# Patient Record
Sex: MALE | Race: WHITE | Employment: UNEMPLOYED | URBAN - METROPOLITAN AREA
[De-identification: names, ages, dates, MRNs, and addresses within clinical notes are randomized per-mention and may not be internally consistent; named-entity substitution may affect disease eponyms.]

---

## 2017-01-06 ENCOUNTER — HOSPITAL ENCOUNTER (EMERGENCY)
Age: 52
Discharge: HOME | End: 2017-01-06
Attending: FAMILY MEDICINE

## 2017-01-06 VITALS
DIASTOLIC BLOOD PRESSURE: 85 MMHG | BODY MASS INDEX: 29.03 KG/M2 | HEART RATE: 79 BPM | OXYGEN SATURATION: 97 % | TEMPERATURE: 98.1 F | RESPIRATION RATE: 20 BRPM | SYSTOLIC BLOOD PRESSURE: 158 MMHG | WEIGHT: 219 LBS | HEIGHT: 73 IN

## 2017-01-06 DIAGNOSIS — I87.2 VENOUS INSUFFICIENCY OF LEFT LOWER EXTREMITY: ICD-10-CM

## 2017-01-06 DIAGNOSIS — L03.116 CELLULITIS OF FOOT, LEFT: Primary | ICD-10-CM

## 2017-01-06 RX ORDER — CEPHALEXIN 500 MG/1
500 CAPSULE ORAL 4 TIMES DAILY
Qty: 28 CAP | Refills: 0 | Status: SHIPPED | OUTPATIENT
Start: 2017-01-06 | End: 2017-01-13

## 2017-01-06 RX ORDER — CLINDAMYCIN HYDROCHLORIDE 300 MG/1
300 CAPSULE ORAL 4 TIMES DAILY
Qty: 28 CAP | Refills: 0 | Status: SHIPPED | OUTPATIENT
Start: 2017-01-06 | End: 2017-01-13

## 2017-01-06 RX ORDER — MUPIROCIN 20 MG/G
OINTMENT TOPICAL 3 TIMES DAILY
Qty: 22 G | Refills: 0 | Status: SHIPPED | OUTPATIENT
Start: 2017-01-06 | End: 2017-08-13

## 2017-01-06 NOTE — DISCHARGE INSTRUCTIONS

## 2017-01-06 NOTE — UC NOTE
Wound care applied to left wound with silvadene and non adherent dressing and gauze. Pt provided with cast shoe per request for easier ambulation and wound protection.

## 2017-01-13 NOTE — UC PROVIDER NOTE
Patient is a 45 y.o. male presenting with wound check. The history is provided by the patient. Wound Check    This is a recurrent problem. The current episode started more than 2 days ago. The problem occurs constantly. The problem has been rapidly worsening. The pain is present in the left foot and left toes. The quality of the pain is described as aching. The pain is at a severity of 3/10. The pain is mild. Associated symptoms comments: Drainage from forefoot dorsal surface and swelling  Tender. . He has tried nothing for the symptoms. There has been no history of extremity trauma. Past Medical History   Diagnosis Date    [facility]neurysm [facility])      (with stroke)    Gout     Hypercholesterolemia     Hypertension     Lesion of bladder     [facility]     Stroke ([facility] [facility] 2006     left sided weakness    [facility]us ([facility]     Thyroid disease     TIA (transient ischemic attack) 2012        Past Surgical History   Procedure Laterality Date    Knee arthroscp harv       left knee    Hx orthopaedic           Family History   Problem Relation Age of Onset    Diabetes Father     Diabetes Sister     Diabetes Brother         Social History     Social History    Marital status:      Spouse name: N/A    Number of children: N/A    Years of education: N/A     Occupational History    Not on file.      Social History Main Topics    Smoking status: Current Every Day Smoker     Packs/day: 1.00    Smokeless tobacco: Never Used    Alcohol use 8.4 oz/week     14 Cans of beer per week    Drug use: Yes     Special: Prescription    Sexual activity: Yes     Other Topics Concern    Not on file     Social History Narrative                ALLERGIES: Sulfamethoxazole-trimethoprim; Ciprofloxacin; Codeine; Lyrica [pregabalin]; and Ultram [tramadol]    Review of Systems    Vitals:    01/06/17 1407   BP: 158/85   Pulse: 79   Resp: 20   Temp: 98.1 °F (36.7 °C)   SpO2: 97%   Weight: 99.3 kg (219 lb)   Height: 6' 1\" (1.854 m)       Physical Exam   Constitutional: No distress. Musculoskeletal:        Left foot: There is decreased range of motion, tenderness and swelling. There is no laceration (superficial abrasion with crusting and drainage on dorsal ). Feet:    Nursing note and vitals reviewed. MDM     Differential Diagnosis; Clinical Impression; Plan:     CLINICAL IMPRESSION:  Cellulitis of foot, left  (primary encounter diagnosis)  Venous insufficiency of left lower extremity      DDX    Plan:    Clean wound and dressing  Oral antibiotics and further work up for vascular disease. Amount and/or Complexity of Data Reviewed:    Review and summarize past medical records:  Yes  Risk of Significant Complications, Morbidity, and/or Mortality:   Presenting problems: Moderate  Management options:   Moderate  Progress:   Patient progress:  Stable      Procedures

## 2017-01-27 ENCOUNTER — HOSPITAL ENCOUNTER (EMERGENCY)
Age: 52
Discharge: HOME | End: 2017-01-27
Attending: FAMILY MEDICINE

## 2017-01-27 VITALS
RESPIRATION RATE: 20 BRPM | WEIGHT: 218.6 LBS | DIASTOLIC BLOOD PRESSURE: 96 MMHG | HEART RATE: 77 BPM | TEMPERATURE: 97.6 F | HEIGHT: 73 IN | OXYGEN SATURATION: 95 % | BODY MASS INDEX: 28.97 KG/M2 | SYSTOLIC BLOOD PRESSURE: 182 MMHG

## 2017-01-27 DIAGNOSIS — L03.116 CELLULITIS OF LEFT LOWER EXTREMITY: Primary | ICD-10-CM

## 2017-01-27 RX ORDER — AMOXICILLIN AND CLAVULANATE POTASSIUM 875; 125 MG/1; MG/1
1 TABLET, FILM COATED ORAL 2 TIMES DAILY
Qty: 20 TAB | Refills: 0 | Status: SHIPPED | OUTPATIENT
Start: 2017-01-27 | End: 2017-08-13

## 2017-01-27 NOTE — DISCHARGE INSTRUCTIONS

## 2017-01-28 NOTE — UC PROVIDER NOTE
Patient is a 45 y.o. male presenting with skin problem and foot pain. The history is provided by the patient. Skin Problem    This is a recurrent problem. The current episode started more than 2 days ago. The problem has not changed since onset. The problem is associated with an unknown factor. There has been no fever. The rash is present on the left foot. The pain is mild. The pain has been constant since onset. Associated symptoms include blisters and weeping. Foot Pain    This is a recurrent problem. The current episode started more than 2 days ago. The problem occurs daily. The problem has not changed since onset. The pain is present in the left foot. The pain is mild. Pertinent negatives include no numbness and full range of motion. Treatments tried: Antibiotics. There has been no history of extremity trauma. Past Medical History   Diagnosis Date    Aneurysm [facility])      (with stroke)    Gout     Hypercholesterolemia     Hypertension     Lesion of bladder     [facility] [facility]     Stroke ([facility] [facility] 2006     left sided weakness    Throm[facility]embolus ([facility] [facility]     Thyroid disease     TIA (transient ischemic attack) 2012        Past Surgical History   Procedure Laterality Date    Knee arthroscp harv       left knee    Hx orthopaedic           Family History   Problem Relation Age of Onset    Diabetes Father     Diabetes Sister     Diabetes Brother         Social History     Social History    Marital status:      Spouse name: N/A    Number of children: N/A    Years of education: N/A     Occupational History    Not on file.      Social History Main Topics    Smoking status: Current Every Day Smoker     Packs/day: 1.00    Smokeless tobacco: Never Used    Alcohol use 8.4 oz/week     14 Cans of beer per week    Drug use: Yes     Special: Prescription    Sexual activity: Yes     Other Topics Concern    Not on file     Social History Narrative                ALLERGIES: Sulfamethoxazole-trimethoprim; Ciprofloxacin; Codeine; Lyrica [pregabalin]; and Ultram [tramadol]    Review of Systems   Constitutional: Negative for chills and fever. Skin: Positive for color change and wound. Neurological: Negative for numbness. Vitals:    01/27/17 1259   BP: (!) 182/96   Pulse: 77   Resp: 20   Temp: 97.6 °F (36.4 °C)   SpO2: 95%   Weight: 99.2 kg (218 lb 9.6 oz)   Height: 6' 1\" (1.854 m)       Physical Exam   Constitutional: He is oriented to person, place, and time. He appears well-developed and well-nourished. Pulmonary/Chest: Effort normal.   Neurological: He is alert and oriented to person, place, and time. Skin: Skin is warm and dry. There is erythema. Second and third toes swollen and red   Psychiatric: He has a normal mood and affect. His behavior is normal. Judgment and thought content normal.   Nursing note and vitals reviewed. MDM     Differential Diagnosis; Clinical Impression; Plan:     CLINICAL IMPRESSION:  Cellulitis of left lower extremity  (primary encounter diagnosis)    Plan:  1. Augmentin 875 BID  2. Pt alanly advised to follow up with a PCP. 3.   Risk of Significant Complications, Morbidity, and/or Mortality:   Presenting problems: Moderate  Diagnostic procedures: Moderate  Management options:   Moderate  Progress:   Patient progress:  Stable      Procedures

## 2017-08-13 ENCOUNTER — HOSPITAL ENCOUNTER (EMERGENCY)
Age: 52
Discharge: HOME | End: 2017-08-13
Attending: FAMILY MEDICINE

## 2017-08-13 VITALS
SYSTOLIC BLOOD PRESSURE: 96 MMHG | WEIGHT: 208 LBS | DIASTOLIC BLOOD PRESSURE: 62 MMHG | TEMPERATURE: 97.9 F | BODY MASS INDEX: 27.57 KG/M2 | HEART RATE: 72 BPM | RESPIRATION RATE: 20 BRPM | HEIGHT: 73 IN | OXYGEN SATURATION: 98 %

## 2017-08-13 DIAGNOSIS — L03.032 CELLULITIS OF TOE OF LEFT FOOT: Primary | ICD-10-CM

## 2017-08-13 DIAGNOSIS — M79.672 CHRONIC FOOT PAIN, LEFT: ICD-10-CM

## 2017-08-13 DIAGNOSIS — G89.29 CHRONIC FOOT PAIN, LEFT: ICD-10-CM

## 2017-08-13 RX ORDER — FAMOTIDINE 20 MG/1
20 TABLET, FILM COATED ORAL 2 TIMES DAILY
Qty: 20 TAB | Refills: 0 | Status: SHIPPED | OUTPATIENT
Start: 2017-08-13 | End: 2017-08-23

## 2017-08-13 RX ORDER — KETOCONAZOLE 200 MG/1
200 TABLET ORAL DAILY
Qty: 10 TAB | Refills: 0 | Status: SHIPPED | OUTPATIENT
Start: 2017-08-13 | End: 2017-08-23

## 2017-08-13 RX ORDER — CEPHALEXIN 500 MG/1
500 CAPSULE ORAL 4 TIMES DAILY
Qty: 28 CAP | Refills: 0 | Status: SHIPPED | OUTPATIENT
Start: 2017-08-13 | End: 2017-08-20

## 2017-08-13 RX ORDER — KETOROLAC TROMETHAMINE 10 MG/1
10 TABLET, FILM COATED ORAL
Qty: 20 TAB | Refills: 0 | Status: SHIPPED | OUTPATIENT
Start: 2017-08-13 | End: 2017-12-21

## 2017-08-13 RX ORDER — CLINDAMYCIN HYDROCHLORIDE 300 MG/1
300 CAPSULE ORAL 4 TIMES DAILY
Qty: 28 CAP | Refills: 0 | Status: SHIPPED | OUTPATIENT
Start: 2017-08-13 | End: 2017-08-20

## 2017-08-13 NOTE — UC PROVIDER NOTE
Patient is a 45 y.o. male presenting with wound check. The history is provided by the patient. Wound Check    This is a recurrent problem. The current episode started more than 1 week ago. The problem occurs constantly. The problem has not changed since onset. The pain is present in the left foot. The quality of the pain is described as constant (burning ). The pain is at a severity of 9/10. The pain is severe. Associated symptoms include limited range of motion (of toes). There has been no history of extremity trauma. She is been followed by podiatrist  Not seen by vascular surgeon and wound care    Past Medical History:   Diagnosis Date    Aneurysm ([address]     (with stroke)    Gout     Hypercholesterolemia     Hypertension     Lesion of bladder     [facility] [facility]     Stroke ([facility] [address] [facility] 2006    left sided weakness    Thromboembolus ([facility] [facility]     Thyroid disease     TIA (transient ischemic attack) 2012        Past Surgical History:   Procedure Laterality Date    HX ORTHOPAEDIC      KNEE ARTHROSCP HARV      left knee         Family History   Problem Relation Age of Onset    Diabetes Father     Diabetes Sister     Diabetes Brother         Social History     Social History    Marital status:      Spouse name: N/A    Number of children: N/A    Years of education: N/A     Occupational History    Not on file. Social History Main Topics    Smoking status: Current Every Day Smoker     Packs/day: 1.00    Smokeless tobacco: Never Used    Alcohol use 8.4 oz/week     14 Cans of beer per week    Drug use: Yes     Special: Prescription    Sexual activity: Yes     Other Topics Concern    Not on file     Social History Narrative                ALLERGIES: Sulfamethoxazole-trimethoprim; Ciprofloxacin; Codeine; Lyrica [pregabalin]; and Ultram [tramadol]    Review of Systems   All other systems reviewed and are negative.       Vitals:    08/13/17 1456 08/13/17 1458 08/13/17 1502   BP:  96/62    Pulse:  72 Resp:  20    Temp:  97.9 °F (36.6 °C)    SpO2:  98%    Weight:   94.3 kg (208 lb)   Height: 6' 1\" (1.854 m)         Physical Exam   Constitutional: No distress. Musculoskeletal:        Left foot: There is tenderness, swelling, decreased capillary refill and deformity (of big toe). Feet:    Nursing note and vitals reviewed. MDM     Differential Diagnosis; Clinical Impression; Plan:     CLINICAL IMPRESSION:  Cellulitis of toe of left foot  (primary encounter diagnosis)  Chronic foot pain, left      DDX    Plan:    Refer to podiatry and wound care center  Start clinda and keflex  Bacitracin dressing  Daily wound care  D/w friend and caretaker     Treatment and follow up plan discussed in length   Amount and/or Complexity of Data Reviewed:    Review and summarize past medical records:  Yes  Risk of Significant Complications, Morbidity, and/or Mortality:   Presenting problems: Moderate  Management options:   Moderate  Progress:   Patient progress:  Stable      Procedures

## 2017-08-13 NOTE — DISCHARGE INSTRUCTIONS

## 2017-12-21 ENCOUNTER — APPOINTMENT (OUTPATIENT)
Dept: GENERAL RADIOLOGY | Age: 52
DRG: 383 | End: 2017-12-21
Attending: PODIATRIST
Payer: MEDICAID

## 2017-12-21 ENCOUNTER — APPOINTMENT (OUTPATIENT)
Dept: GENERAL RADIOLOGY | Age: 52
DRG: 383 | End: 2017-12-21
Attending: EMERGENCY MEDICINE
Payer: MEDICAID

## 2017-12-21 ENCOUNTER — APPOINTMENT (OUTPATIENT)
Dept: ULTRASOUND IMAGING | Age: 52
DRG: 383 | End: 2017-12-21
Attending: EMERGENCY MEDICINE
Payer: MEDICAID

## 2017-12-21 ENCOUNTER — HOSPITAL ENCOUNTER (INPATIENT)
Age: 52
LOS: 23 days | Discharge: HOME HEALTH | DRG: 383 | End: 2018-01-13
Attending: EMERGENCY MEDICINE | Admitting: INTERNAL MEDICINE
Payer: MEDICAID

## 2017-12-21 DIAGNOSIS — R53.81 PHYSICAL DEBILITY: ICD-10-CM

## 2017-12-21 DIAGNOSIS — L03.119 CELLULITIS OF LOWER EXTREMITY, UNSPECIFIED LATERALITY: Primary | ICD-10-CM

## 2017-12-21 DIAGNOSIS — F33.1 MODERATE EPISODE OF RECURRENT MAJOR DEPRESSIVE DISORDER (HCC): ICD-10-CM

## 2017-12-21 DIAGNOSIS — G89.0 CENTRAL PAIN SYNDROME: ICD-10-CM

## 2017-12-21 DIAGNOSIS — Z79.891 LONG TERM CURRENT USE OF METHADONE FOR PAIN CONTROL: ICD-10-CM

## 2017-12-21 PROBLEM — L03.116 BILATERAL CELLULITIS OF LOWER LEG: Status: ACTIVE | Noted: 2017-12-21

## 2017-12-21 PROBLEM — L03.115 BILATERAL CELLULITIS OF LOWER LEG: Status: ACTIVE | Noted: 2017-12-21

## 2017-12-21 PROBLEM — I10 HTN (HYPERTENSION): Status: ACTIVE | Noted: 2017-12-21

## 2017-12-21 LAB
ALBUMIN SERPL-MCNC: 3.5 G/DL (ref 3.5–5)
ALBUMIN/GLOB SERPL: 1 {RATIO} (ref 1.1–2.2)
ALP SERPL-CCNC: 240 U/L (ref 45–117)
ALT SERPL-CCNC: 27 U/L (ref 12–78)
ANION GAP SERPL CALC-SCNC: 8 MMOL/L (ref 5–15)
APTT PPP: 29.3 SEC (ref 22.1–32.5)
AST SERPL-CCNC: 20 U/L (ref 15–37)
ATRIAL RATE: 63 BPM
BASOPHILS # BLD: 0 K/UL (ref 0–0.1)
BASOPHILS NFR BLD: 0 % (ref 0–1)
BILIRUB SERPL-MCNC: 0.6 MG/DL (ref 0.2–1)
BNP SERPL-MCNC: 79 PG/ML (ref 0–125)
BUN SERPL-MCNC: 6 MG/DL (ref 6–20)
BUN/CREAT SERPL: 9 (ref 12–20)
CALCIUM SERPL-MCNC: 8.6 MG/DL (ref 8.5–10.1)
CALCULATED P AXIS, ECG09: 57 DEGREES
CALCULATED R AXIS, ECG10: 10 DEGREES
CALCULATED T AXIS, ECG11: 24 DEGREES
CHLORIDE SERPL-SCNC: 105 MMOL/L (ref 97–108)
CK MB CFR SERPL CALC: 1.4 % (ref 0–2.5)
CK MB SERPL-MCNC: 1.1 NG/ML (ref 5–25)
CK SERPL-CCNC: 77 U/L (ref 39–308)
CO2 SERPL-SCNC: 25 MMOL/L (ref 21–32)
CREAT SERPL-MCNC: 0.69 MG/DL (ref 0.7–1.3)
DIAGNOSIS, 93000: NORMAL
EOSINOPHIL # BLD: 0.2 K/UL (ref 0–0.4)
EOSINOPHIL NFR BLD: 2 % (ref 0–7)
ERYTHROCYTE [DISTWIDTH] IN BLOOD BY AUTOMATED COUNT: 16.9 % (ref 11.5–14.5)
ERYTHROCYTE [SEDIMENTATION RATE] IN BLOOD: 17 MM/HR (ref 0–20)
GLOBULIN SER CALC-MCNC: 3.5 G/DL (ref 2–4)
GLUCOSE SERPL-MCNC: 101 MG/DL (ref 65–100)
HCT VFR BLD AUTO: 36.6 % (ref 36.6–50.3)
HGB BLD-MCNC: 11.8 G/DL (ref 12.1–17)
INR PPP: 1.1 (ref 0.9–1.1)
LACTATE SERPL-SCNC: 0.9 MMOL/L (ref 0.4–2)
LYMPHOCYTES # BLD: 2.1 K/UL (ref 0.8–3.5)
LYMPHOCYTES NFR BLD: 17 % (ref 12–49)
MCH RBC QN AUTO: 27.7 PG (ref 26–34)
MCHC RBC AUTO-ENTMCNC: 32.2 G/DL (ref 30–36.5)
MCV RBC AUTO: 85.9 FL (ref 80–99)
MONOCYTES # BLD: 0.8 K/UL (ref 0–1)
MONOCYTES NFR BLD: 6 % (ref 5–13)
NEUTS SEG # BLD: 9.6 K/UL (ref 1.8–8)
NEUTS SEG NFR BLD: 75 % (ref 32–75)
P-R INTERVAL, ECG05: 180 MS
PLATELET # BLD AUTO: 319 K/UL (ref 150–400)
POTASSIUM SERPL-SCNC: 3.8 MMOL/L (ref 3.5–5.1)
PROT SERPL-MCNC: 7 G/DL (ref 6.4–8.2)
PROTHROMBIN TIME: 11.4 SEC (ref 9–11.1)
Q-T INTERVAL, ECG07: 458 MS
QRS DURATION, ECG06: 96 MS
QTC CALCULATION (BEZET), ECG08: 468 MS
RBC # BLD AUTO: 4.26 M/UL (ref 4.1–5.7)
SODIUM SERPL-SCNC: 138 MMOL/L (ref 136–145)
THERAPEUTIC RANGE,PTTT: NORMAL SECS (ref 58–77)
VENTRICULAR RATE, ECG03: 63 BPM
WBC # BLD AUTO: 12.7 K/UL (ref 4.1–11.1)

## 2017-12-21 PROCEDURE — 73630 X-RAY EXAM OF FOOT: CPT

## 2017-12-21 PROCEDURE — 96368 THER/DIAG CONCURRENT INF: CPT

## 2017-12-21 PROCEDURE — 96375 TX/PRO/DX INJ NEW DRUG ADDON: CPT

## 2017-12-21 PROCEDURE — 80053 COMPREHEN METABOLIC PANEL: CPT | Performed by: EMERGENCY MEDICINE

## 2017-12-21 PROCEDURE — 83605 ASSAY OF LACTIC ACID: CPT | Performed by: EMERGENCY MEDICINE

## 2017-12-21 PROCEDURE — 85652 RBC SED RATE AUTOMATED: CPT | Performed by: EMERGENCY MEDICINE

## 2017-12-21 PROCEDURE — 99285 EMERGENCY DEPT VISIT HI MDM: CPT

## 2017-12-21 PROCEDURE — 74011000250 HC RX REV CODE- 250: Performed by: INTERNAL MEDICINE

## 2017-12-21 PROCEDURE — 85730 THROMBOPLASTIN TIME PARTIAL: CPT | Performed by: EMERGENCY MEDICINE

## 2017-12-21 PROCEDURE — 93005 ELECTROCARDIOGRAM TRACING: CPT

## 2017-12-21 PROCEDURE — 85025 COMPLETE CBC W/AUTO DIFF WBC: CPT | Performed by: EMERGENCY MEDICINE

## 2017-12-21 PROCEDURE — 74011000258 HC RX REV CODE- 258: Performed by: EMERGENCY MEDICINE

## 2017-12-21 PROCEDURE — 96365 THER/PROPH/DIAG IV INF INIT: CPT

## 2017-12-21 PROCEDURE — 83880 ASSAY OF NATRIURETIC PEPTIDE: CPT | Performed by: EMERGENCY MEDICINE

## 2017-12-21 PROCEDURE — 74011250636 HC RX REV CODE- 250/636: Performed by: INTERNAL MEDICINE

## 2017-12-21 PROCEDURE — 82550 ASSAY OF CK (CPK): CPT | Performed by: EMERGENCY MEDICINE

## 2017-12-21 PROCEDURE — 74011250636 HC RX REV CODE- 250/636: Performed by: EMERGENCY MEDICINE

## 2017-12-21 PROCEDURE — 87040 BLOOD CULTURE FOR BACTERIA: CPT | Performed by: EMERGENCY MEDICINE

## 2017-12-21 PROCEDURE — 65660000000 HC RM CCU STEPDOWN

## 2017-12-21 PROCEDURE — 74011000258 HC RX REV CODE- 258: Performed by: INTERNAL MEDICINE

## 2017-12-21 PROCEDURE — 85610 PROTHROMBIN TIME: CPT | Performed by: EMERGENCY MEDICINE

## 2017-12-21 PROCEDURE — 71010 XR CHEST PORT: CPT

## 2017-12-21 PROCEDURE — 93970 EXTREMITY STUDY: CPT

## 2017-12-21 PROCEDURE — 36415 COLL VENOUS BLD VENIPUNCTURE: CPT | Performed by: EMERGENCY MEDICINE

## 2017-12-21 RX ORDER — NALOXONE HYDROCHLORIDE 0.4 MG/ML
0.4 INJECTION, SOLUTION INTRAMUSCULAR; INTRAVENOUS; SUBCUTANEOUS AS NEEDED
Status: DISCONTINUED | OUTPATIENT
Start: 2017-12-21 | End: 2018-01-13 | Stop reason: HOSPADM

## 2017-12-21 RX ORDER — VANCOMYCIN 1.75 GRAM/500 ML IN 0.9 % SODIUM CHLORIDE INTRAVENOUS
1750
Status: COMPLETED | OUTPATIENT
Start: 2017-12-21 | End: 2017-12-21

## 2017-12-21 RX ORDER — SODIUM CHLORIDE 0.9 % (FLUSH) 0.9 %
5-10 SYRINGE (ML) INJECTION AS NEEDED
Status: DISCONTINUED | OUTPATIENT
Start: 2017-12-21 | End: 2017-12-23

## 2017-12-21 RX ORDER — SODIUM CHLORIDE 0.9 % (FLUSH) 0.9 %
5-10 SYRINGE (ML) INJECTION AS NEEDED
Status: DISCONTINUED | OUTPATIENT
Start: 2017-12-21 | End: 2018-01-13 | Stop reason: HOSPADM

## 2017-12-21 RX ORDER — SODIUM CHLORIDE 0.9 % (FLUSH) 0.9 %
5-10 SYRINGE (ML) INJECTION EVERY 8 HOURS
Status: DISCONTINUED | OUTPATIENT
Start: 2017-12-21 | End: 2018-01-13 | Stop reason: HOSPADM

## 2017-12-21 RX ORDER — ENOXAPARIN SODIUM 100 MG/ML
40 INJECTION SUBCUTANEOUS EVERY 24 HOURS
Status: DISCONTINUED | OUTPATIENT
Start: 2017-12-21 | End: 2018-01-13 | Stop reason: HOSPADM

## 2017-12-21 RX ORDER — VANCOMYCIN HYDROCHLORIDE
1250 EVERY 8 HOURS
Status: DISCONTINUED | OUTPATIENT
Start: 2017-12-21 | End: 2017-12-26

## 2017-12-21 RX ORDER — ALLOPURINOL 100 MG/1
TABLET ORAL DAILY
Status: ON HOLD | COMMUNITY
End: 2018-01-12

## 2017-12-21 RX ORDER — GUAIFENESIN 100 MG/5ML
81 LIQUID (ML) ORAL DAILY
Status: DISCONTINUED | OUTPATIENT
Start: 2017-12-22 | End: 2018-01-13 | Stop reason: HOSPADM

## 2017-12-21 RX ORDER — HYDROMORPHONE HYDROCHLORIDE 2 MG/ML
1 INJECTION, SOLUTION INTRAMUSCULAR; INTRAVENOUS; SUBCUTANEOUS
Status: DISCONTINUED | OUTPATIENT
Start: 2017-12-21 | End: 2017-12-22

## 2017-12-21 RX ORDER — HYDROMORPHONE HYDROCHLORIDE 2 MG/ML
1 INJECTION, SOLUTION INTRAMUSCULAR; INTRAVENOUS; SUBCUTANEOUS
Status: DISCONTINUED | OUTPATIENT
Start: 2017-12-21 | End: 2017-12-21

## 2017-12-21 RX ORDER — SODIUM CHLORIDE 900 MG/100ML
INJECTION INTRAVENOUS
Status: DISPENSED
Start: 2017-12-21 | End: 2017-12-22

## 2017-12-21 RX ORDER — ONDANSETRON 2 MG/ML
4 INJECTION INTRAMUSCULAR; INTRAVENOUS
Status: COMPLETED | OUTPATIENT
Start: 2017-12-21 | End: 2017-12-21

## 2017-12-21 RX ORDER — SODIUM CHLORIDE 0.9 % (FLUSH) 0.9 %
5-10 SYRINGE (ML) INJECTION EVERY 8 HOURS
Status: DISCONTINUED | OUTPATIENT
Start: 2017-12-21 | End: 2017-12-23

## 2017-12-21 RX ORDER — LEVOTHYROXINE SODIUM 75 UG/1
75 TABLET ORAL
Status: DISCONTINUED | OUTPATIENT
Start: 2017-12-22 | End: 2018-01-13 | Stop reason: HOSPADM

## 2017-12-21 RX ORDER — LISINOPRIL 20 MG/1
20 TABLET ORAL DAILY
Status: DISCONTINUED | OUTPATIENT
Start: 2017-12-22 | End: 2017-12-27

## 2017-12-21 RX ORDER — ONDANSETRON 2 MG/ML
4 INJECTION INTRAMUSCULAR; INTRAVENOUS
Status: DISCONTINUED | OUTPATIENT
Start: 2017-12-21 | End: 2018-01-13 | Stop reason: HOSPADM

## 2017-12-21 RX ORDER — HYDROMORPHONE HYDROCHLORIDE 2 MG/ML
1 INJECTION, SOLUTION INTRAMUSCULAR; INTRAVENOUS; SUBCUTANEOUS
Status: COMPLETED | OUTPATIENT
Start: 2017-12-21 | End: 2017-12-21

## 2017-12-21 RX ADMIN — HYDROMORPHONE HYDROCHLORIDE 1 MG: 2 INJECTION, SOLUTION INTRAMUSCULAR; INTRAVENOUS; SUBCUTANEOUS at 17:00

## 2017-12-21 RX ADMIN — VANCOMYCIN HYDROCHLORIDE 1250 MG: 10 INJECTION, POWDER, LYOPHILIZED, FOR SOLUTION INTRAVENOUS at 22:13

## 2017-12-21 RX ADMIN — HYDROMORPHONE HYDROCHLORIDE 1 MG: 2 INJECTION, SOLUTION INTRAMUSCULAR; INTRAVENOUS; SUBCUTANEOUS at 19:11

## 2017-12-21 RX ADMIN — PIPERACILLIN SODIUM,TAZOBACTAM SODIUM 3.38 G: 3; .375 INJECTION, POWDER, FOR SOLUTION INTRAVENOUS at 14:12

## 2017-12-21 RX ADMIN — HYDROMORPHONE HYDROCHLORIDE 1 MG: 2 INJECTION, SOLUTION INTRAMUSCULAR; INTRAVENOUS; SUBCUTANEOUS at 11:37

## 2017-12-21 RX ADMIN — FAMOTIDINE 20 MG: 10 INJECTION, SOLUTION INTRAVENOUS at 21:49

## 2017-12-21 RX ADMIN — SODIUM CHLORIDE: 900 INJECTION, SOLUTION INTRAVENOUS at 21:48

## 2017-12-21 RX ADMIN — HYDROMORPHONE HYDROCHLORIDE 1 MG: 2 INJECTION, SOLUTION INTRAMUSCULAR; INTRAVENOUS; SUBCUTANEOUS at 23:40

## 2017-12-21 RX ADMIN — VANCOMYCIN HYDROCHLORIDE 1750 MG: 10 INJECTION, POWDER, LYOPHILIZED, FOR SOLUTION INTRAVENOUS at 14:51

## 2017-12-21 RX ADMIN — HYDROMORPHONE HYDROCHLORIDE 1 MG: 2 INJECTION, SOLUTION INTRAMUSCULAR; INTRAVENOUS; SUBCUTANEOUS at 21:36

## 2017-12-21 RX ADMIN — Medication 10 ML: at 23:40

## 2017-12-21 RX ADMIN — ONDANSETRON HYDROCHLORIDE 4 MG: 2 INJECTION, SOLUTION INTRAMUSCULAR; INTRAVENOUS at 11:37

## 2017-12-21 RX ADMIN — ENOXAPARIN SODIUM 40 MG: 40 INJECTION SUBCUTANEOUS at 17:00

## 2017-12-21 RX ADMIN — Medication 10 ML: at 21:36

## 2017-12-21 RX ADMIN — Medication 10 ML: at 17:02

## 2017-12-21 NOTE — H&P
Hospitalist Admission Note    NAME: Shannan Thakur   :  1965   MRN:  [id number]     Date/Time:  2017 4:21 PM    Patient PCP: Luis Alfredo Yougn MD Podiatry= Dr Suri Cervantes  ________________________________________________________________________    My assessment of this patient's clinical condition and my plan of care is as follows. Assessment / Plan:  L >R leg cellulitis POA  Chronic non healing L Foot ulcer/wound POA  Leukocytosis POA  WBC= 12.7  Lact normal    Admit to telemetry bed - accelerated HTN in ER  IV Abx - zosyn + Vanc for now  Follow Blood Cx  Wound care consult  Pain management    Morbid Obesity POA  L Hemiparesis/Old CVA POA  Chronic Pain syndrome on maintenance Methadone- [facility]  HTN  Hyperlipidemia    Cont baseline methadone after pharmacy confirms- pt claims on 65 mg daily   Cont ASA  Cont lisinopril        Code Status: Full  Surrogate Decision Maker: sister Tad Packer # [phone]    DVT Prophylaxis: SQ Lovenox  GI Prophylaxis: pepcid    Baseline: pt is functional at baseline- uses cane to ambulate at baseline        Subjective:   CHIEF COMPLAINT: worsening L LE swelling with pain x 2 days    HISTORY OF PRESENT ILLNESS:     Kelly Fletcher is a 47 y.o.  male who presents with above complains from home ambulatory. Pt came in with CC of worsening pain in the L foot x 2 days  H/o associated redness swelling L leg >R leg with low grade fever at home as per pt  H/o doing his own wound care for the L foot -- use to have [facility] for wound care & sees Dr Suri Cervantes (podiarty) too. No h/o diabetes  H/o Chronic pain from L sided hemiparesis from old CVA - on methadone as per pt. Pt was found to have significant pain with swelling & redness of the L leg , some on R leg in ER with unremarkable blood work in ER    We were asked to admit for work up and evaluation of the above problems.      Past Medical History:   Diagnosis Date    Aneurysm ([address]     (with stroke)    Gout     Hypercholesterolemia     Hypertension     Lesion of bladder     [facility] ([facility] [facility]     Stroke [facility]) 2006    left sided weakness    Thromboembolus ([facility] [address] [facility]     Thyroid disease     TIA (transient ischemic attack) 2012        Past Surgical History:   Procedure Laterality Date    HX ORTHOPAEDIC      KNEE ARTHROSCP HARV      left knee       Social History   Substance Use Topics    Smoking status: Current Every Day Smoker     Packs/day: 1.00    Smokeless tobacco: Never Used    Alcohol use 8.4 oz/week     14 Cans of beer per week        Family History   Problem Relation Age of Onset    Diabetes Father     Diabetes Sister     Diabetes Brother      Allergies   Allergen Reactions    Sulfamethoxazole-Trimethoprim Rash     L eye and L hand    Ciprofloxacin Hives     Per pcp records    Codeine Hives     Tolerates dilaudid, oxycodone    Lyrica [Pregabalin] Hives    Ultram [Tramadol] Hives        Prior to Admission medications    Medication Sig Start Date End Date Taking? Authorizing Provider   METHADONE HCL (METHADONE, BULK,) 65 mg by Does Not Apply route daily. Yes Saravanan Horn MD   levothyroxine (SYNTHROID) 75 mcg tablet Take 75 mcg by mouth Daily (before breakfast). Yes Saravanan Horn MD   aspirin 81 mg chewable tablet Take 1 Tab by mouth daily. 12/13/12  Yes Tasia Donis MD   lisinopril (PRINIVIL, ZESTRIL) 20 mg tablet Take 1 Tab by mouth daily. 6/22/12  Yes Good Joy NP   allopurinol (ZYLOPRIM) 100 mg tablet Take  by mouth daily. Historical Provider   colchicine (COLCRYS) 0.6 mg tablet Take 0.6 mg by mouth daily.     Saravanan Horn MD       REVIEW OF SYSTEMS:       Total of 12 systems reviewed as follows:       POSITIVE= underlined text  Negative = text not underlined  General:  fever, chills, sweats, generalized weakness, weight loss/gain,      loss of appetite   Eyes:    blurred vision, eye pain, loss of vision, double vision  ENT:    rhinorrhea, pharyngitis   Respiratory:   cough, sputum production, SOB, BECK, wheezing, pleuritic pain   Cardiology:   chest pain, palpitations, orthopnea, PND, B/L LE edema, syncope   Gastrointestinal:  abdominal pain , N/V, diarrhea, dysphagia, constipation, bleeding   Genitourinary:  frequency, urgency, dysuria, hematuria, incontinence   Muskuloskeletal :  Arthralgia ( L foot pain) myalgia, back pain  Hematology:  easy bruising, nose or gum bleeding, lymphadenopathy   Dermatological: Chronic L Foot ulcer  Endocrine:   hot flashes or polydipsia   Neurological:  headache, dizziness, confusion, focal weakness, paresthesia,     Speech difficulties, memory loss, gait difficulty  Psychological: Feelings of anxiety, depression, agitation    Objective:   VITALS:    Visit Vitals    /82    Pulse 72    Resp 16    Ht 6' 1\" (1.854 m)    Wt 94.3 kg (208 lb)    SpO2 96%    BMI 27.44 kg/m2       PHYSICAL EXAM:    General:    Alert, cooperative, no distress, appears stated age, obese +     HEENT: Atraumatic, anicteric sclerae, pink conjunctivae     No oral ulcers, mucosa moist, throat clear, dentition fair  Neck:  Supple, symmetrical,  thyroid: non tender  Lungs:   Clear to auscultation bilaterally. No Wheezing or Rhonchi. No rales. Chest wall:  No tenderness  No Accessory muscle use. Heart:   Regular  rhythm,  No  murmur   No edema  Abdomen:   Soft, non-tender. Not distended. Bowel sounds normal  Extremities: No cyanosis. No clubbing,      Skin turgor normal, Capillary refill normal, Radial dial pulse 2+  Skin:     L >R LE redness+, tenderness +, warm to touch +, Draining Wound/uler on the L foot noted +  Psych:  Good insight. Not depressed. Not anxious or agitated. Neurologic: EOMs intact. No facial asymmetry. No aphasia or slurred speech. L hemiparesis at baseline noted +, Sensation grossly intact.  Alert and oriented X 4.     _______________________________________________________________________  Care Plan discussed with:    Comments   Patient x    Family      RN x Care Manager                    Consultant:      _______________________________________________________________________  Expected  Disposition:   Home with Family    HH/PT/OT/RN ?   SNF/LTC ? [facility]    ________________________________________________________________________  TOTAL TIME:  58 Minutes    Critical Care Provided     Minutes non procedure based      Comments    x Reviewed previous records   >50% of visit spent in counseling and coordination of care x Discussion with patient and questions answered       ________________________________________________________________________  Signed: Abdifatah Rodgers MD    Procedures: see electronic medical records for all procedures/Xrays and details which were not copied into this note but were reviewed prior to creation of Plan. LAB DATA REVIEWED:    Recent Results (from the past 24 hour(s))   EKG, 12 LEAD, INITIAL    Collection Time: 12/21/17 11:24 AM   Result Value Ref Range    Ventricular Rate 63 BPM    Atrial Rate 63 BPM    P-R Interval 180 ms    QRS Duration 96 ms    Q-T Interval 458 ms    QTC Calculation (Bezet) 468 ms    Calculated P Axis 57 degrees    Calculated R Axis 10 degrees    Calculated T Axis 24 degrees    Diagnosis       Normal sinus rhythm  Minimal voltage criteria for LVH, may be normal variant  When compared with ECG of 09-OCT-2016 14:24,  No significant change was found  Confirmed by Frederic Murray ([id number]) on 12/21/2017 11:59:28 AM     CBC WITH AUTOMATED DIFF    Collection Time: 12/21/17 11:28 AM   Result Value Ref Range    WBC 12.7 (H) 4.1 - 11.1 K/uL    RBC 4.26 4.10 - 5.70 M/uL    HGB 11.8 (L) 12.1 - 17.0 g/dL    HCT 36.6 36.6 - 50.3 %    MCV 85.9 80.0 - 99.0 FL    MCH 27.7 26.0 - 34.0 PG    MCHC 32.2 30.0 - 36.5 g/dL    RDW 16.9 (H) 11.5 - 14.5 %    PLATELET 993 785 - 944 K/uL    NEUTROPHILS 75 32 - 75 %    LYMPHOCYTES 17 12 - 49 %    MONOCYTES 6 5 - 13 %    EOSINOPHILS 2 0 - 7 %    BASOPHILS 0 0 - 1 %    ABS.  NEUTROPHILS 9.6 (H) 1.8 - 8.0 K/UL    ABS. LYMPHOCYTES 2.1 0.8 - 3.5 K/UL    ABS. MONOCYTES 0.8 0.0 - 1.0 K/UL    ABS. EOSINOPHILS 0.2 0.0 - 0.4 K/UL    ABS. BASOPHILS 0.0 0.0 - 0.1 K/UL   METABOLIC PANEL, COMPREHENSIVE    Collection Time: 12/21/17 11:28 AM   Result Value Ref Range    Sodium 138 136 - 145 mmol/L    Potassium 3.8 3.5 - 5.1 mmol/L    Chloride 105 97 - 108 mmol/L    CO2 25 21 - 32 mmol/L    Anion gap 8 5 - 15 mmol/L    Glucose 101 (H) 65 - 100 mg/dL    BUN 6 6 - 20 MG/DL    Creatinine 0.69 (L) 0.70 - 1.30 MG/DL    BUN/Creatinine ratio 9 (L) 12 - 20      GFR est AA >60 >60 ml/min/1.73m2    GFR est non-AA >60 >60 ml/min/1.73m2    Calcium 8.6 8.5 - 10.1 MG/DL    Bilirubin, total 0.6 0.2 - 1.0 MG/DL    ALT (SGPT) 27 12 - 78 U/L    AST (SGOT) 20 15 - 37 U/L    Alk.  phosphatase 240 (H) 45 - 117 U/L    Protein, total 7.0 6.4 - 8.2 g/dL    Albumin 3.5 3.5 - 5.0 g/dL    Globulin 3.5 2.0 - 4.0 g/dL    A-G Ratio 1.0 (L) 1.1 - 2.2     LACTIC ACID    Collection Time: 12/21/17 11:28 AM   Result Value Ref Range    Lactic acid 0.9 0.4 - 2.0 MMOL/L   PROTHROMBIN TIME + INR    Collection Time: 12/21/17 11:28 AM   Result Value Ref Range    INR 1.1 0.9 - 1.1      Prothrombin time 11.4 (H) 9.0 - 11.1 sec   PTT    Collection Time: 12/21/17 11:28 AM   Result Value Ref Range    aPTT 29.3 22.1 - 32.5 sec    aPTT, therapeutic range     58.0 - 77.0 SECS   NT-PRO BNP    Collection Time: 12/21/17 11:28 AM   Result Value Ref Range    NT pro-BNP 79 0 - 125 PG/ML   CK W/ CKMB & INDEX    Collection Time: 12/21/17 11:28 AM   Result Value Ref Range    CK 77 39 - 308 U/L    CK - MB 1.1 <3.6 NG/ML    CK-MB Index 1.4 0 - 2.5     SED RATE (ESR)    Collection Time: 12/21/17 11:28 AM   Result Value Ref Range    Sed rate, automated 17 0 - 20 mm/hr

## 2017-12-21 NOTE — ED NOTES
Bedside shift change report given to Ira Vásquez RN (oncoming nurse) by Alejandro Packer RN (offgoing nurse). Report included the following information SBAR, ED Summary, MAR and Recent Results.

## 2017-12-21 NOTE — PROGRESS NOTES
Pharmacy Automatic Renal Dosing Protocol - Antimicrobials    Indication for Antimicrobials: Skin and Soft Tissue Infection    Current Regimen of Each Antimicrobial:  Piperacillin Tazobactam 3.375 grams Q8H  (Start Date 12/21; Day # 1)  Vancomycin 1750 mg x 1 then 1250 mg Q8H Start Date 12/21; Day # 1)    Previous Antimicrobial Therapy:    Vancomycin Goal Level: 10-15    Measured / Extrapolated Vancomycin Level:     Significant Cultures: Pending      Labs:  Recent Labs      12/21/17   1128   CREA  0.69*   BUN  6   WBC  12.7*     No data recorded. Paralysis, amputations, malnutrition: No  Creatinine Clearance (mL/min) or Dialysis: > 100    Impression/Plan:   Vancomycin 1750 mg x 1 then 1250 mg Q8H with anticipate trough of 14 mcg/ml. Piperacillin tazobactam 3.375 grams Q8H per renal dosing protocol. BMP Daily. Pharmacy will follow daily and adjust medications as appropriate for renal function and/or serum levels. Thank you,  Elaina Booker, [facility]      Recommended duration of therapy  [email or web address]/[facility]/Pharmacy/Clinical%[email or web address]. [email or web address]    Renal Dosing  [email or web address]/[place]/[facility]/Pharmacy/Clinical%20Companion/Renal%[email or web address]. pdf

## 2017-12-21 NOTE — IP AVS SNAPSHOT
[address] [facility] 
[phone] Patient: Levon Gomez MRN: [id number] :1965 About your hospitalization You were admitted on:  2017 You last received care in the:  [facility] 3 ORTHOPEDICS You were discharged on:  2018 Why you were hospitalized Your primary diagnosis was:  Bilateral Cellulitis Of Lower Leg Your diagnoses also included:  Htn (Hypertension), Cerebral Infarction (Hcc), Long Term Current Use Of Methadone For Pain Control, Major Depressive Disorder With Current Active Episode, Physical Debility Follow-up Information Follow up With Details Comments Contact Info [facility] [address] [place] [address]  [phone] Ora Samson MD On 2018 Appointment at 10:00 a.m. [facility] 
[phone] Crispin Vieira MD On 1/15/2018 10;00am  hospital follow-up [address] [facility] 7 [address] 
[phone] Erin Mensah DPM On 2018 9;00am 9766 [facility] 57 
[phone] [facility] MEDICAL SUPPLY On 2018 Please follow up with them in regards to the delivery of your wheelchair. Laura [phone] Your Scheduled Appointments  10:00 AM EST New Patient with Ora Samson MD  
[facility] at [facility] [address] [facility]  
[phone] Discharge Orders None A check valentine indicates which time of day the medication should be taken. My Medications START taking these medications Instructions Each Dose to Equal  
 Morning Noon Evening Bedtime  
 polyethylene glycol 17 gram packet Commonly known as:  Claribel Vides Your last dose was: Your next dose is: Take 1 Packet by mouth daily. This is available over the counter 17 g CHANGE how you take these medications Instructions Each Dose to Equal  
 Morning Noon Evening Bedtime  
 allopurinol 100 mg tablet Commonly known as:  Long Villareal What changed:  how much to take Your last dose was: Your next dose is: Take 1 Tab by mouth daily. 100 mg  
    
   
   
   
  
 colchicine 0.6 mg tablet Commonly known as:  COLCRYS What changed:   
- when to take this 
- reasons to take this 
- additional instructions Your last dose was: Your next dose is: Take 1 Tab by mouth daily as needed. For gout flare 0.6 mg  
    
   
   
   
  
  
CONTINUE taking these medications Instructions Each Dose to Equal  
 Morning Noon Evening Bedtime  
 aspirin 81 mg chewable tablet Your last dose was: Your next dose is: Take 1 Tab by mouth daily. 81 mg  
    
   
   
   
  
 levothyroxine 75 mcg tablet Commonly known as:  SYNTHROID Your last dose was: Your next dose is: Take 1 Tab by mouth Daily (before breakfast). 75 mcg  
    
   
   
   
  
 lisinopril 20 mg tablet Commonly known as:  Karyn Young Your last dose was: Your next dose is: Take 1 Tab by mouth daily. 20 mg METHADONE (BULK) Your last dose was: Your next dose is: Take 65 mg by mouth daily. Per [facility] [phone] Dr. Surendra Villegas  Verified 12-22-17  
 65 mg Where to Get Your Medications Information on where to get these meds will be given to you by the nurse or doctor. ! Ask your nurse or doctor about these medications  
  allopurinol 100 mg tablet  
 colchicine 0.6 mg tablet  
 levothyroxine 75 mcg tablet  
 lisinopril 20 mg tablet  
 polyethylene glycol 17 gram packet Discharge Instructions Patient Discharge Instructions David Cerda / [id number] : 1965 Admitted 2017 Discharged: 1/3/2018 DISCHARGE DIAGNOSIS:  
LE cellulitis POA with purulent drainage Chronic non healing left foot ulcer/wound with cellulitis Essential HTN POA/ sinus bradycardia Bladder mass Left sided weakness from Old CVA POA Chronic Pain syndrome on maintenance Methadone at [facility] 65 mg daily Gout arthritis What to do at [facility] 1. Recommended diet: Cardiac Diet 2. Recommended activity: Activity as tolerated, may weight bear on left foot 3. If you experience any of the following symptoms then please call your primary care physician or return to the emergency room if you cannot get hold of your doctor: 
 Fevers > 100.5, chills Nausea or vomiting, persistent diarrhea > 24 hours Blood in stool or black stools Chest pain or SOB 4. If you use over the counter pain relievers like acetaminophen be sure to adhere to bottle instructions. Please be aware that combining NSAIDs (like ibuprofen, naproxen, etc...; ask your pharmacist if you are unsure) with lisinopril may cause kidney failure. Follow-up Information Follow up With Details Comments Contact Info Rip Dickey MD Schedule an appointment as soon as possible for a visit  [address] [place] [address] [address] [facility] 7 [address] 
[phone] [facility] [address] [place] [address]  [phone] Also be dure to go straight to the [facility] after you discharge to discuss you pain medications Keep you Urology appointment as scheduled Daily wound care for the left foot wound: remove the old dressing and cleanse the wound with Carraklenz spray and wipe the wound bed well with gauze, including between the toes to keep them clean and dry.  Apply Xeroform gauze on top of the wound and winding between the toes and then cover with a bulky amount of gauze on each side of the toes (sandwiching the toes). Wrap with William  To secure the dressing and then tape to secure. Use the dilaudid only for severe pain mayela after dressing changes Dilaudid and methadone may cause drowsiness, do not drive a car or drink alcohol while using Information obtained by : 
I understand that if any problems occur once I am at home I am to contact my physician. I understand and acknowledge receipt of the instructions indicated above. Physician's or R.N.'s Signature                                                                  Date/Time Patient or Representative Signature                                                          Date/Time Cellulitis: Care Instructions Your Care Instructions: Items needed for wound care - 4x4's (2 packs), Xeroform gauze, William and tape. Hessie Lung supplied at the hospital (one bottle) but may need to replenish this OR May use NS to cleanse the wound once the Hessie Lung runs out. Wound care to be done daily by the Home Care Nurse: Daily wound care for the left foot wound: remove the old dressing and cleanse the wound with Carraklenz spray and wipe the wound bed well with gauze, including between the toes to keep them clean and dry. Apply Xeroform gauze on top of the wound and wind between the toes to keep them . Cover with a bulky amount of gauze on each side of the toes (sandwiching the toes). Wrap with William  To secure the dressing and then tape to secure. Replace the post op shoe. Need to follow up with Dr. Gavin Garcia as indicated in the discharge instructions per care manager. Cellulitis is a skin infection. It often occurs after a break in the skin from a scrape, cut, bite, or puncture, or after a rash. The doctor has checked you carefully, but problems can develop later. If you notice any problems or new symptoms, get medical treatment right away. Follow-up care is a key part of your treatment and safety. Be sure to make and go to all appointments, and call your doctor if you are having problems. It's also a good idea to know your test results and keep a list of the medicines you take. How can you care for yourself at home? · Take your antibiotics as directed. Do not stop taking them just because you feel better. You need to take the full course of antibiotics. · Prop up the infected area on pillows to reduce pain and swelling. Try to keep the area above the level of your heart as often as you can. · If your doctor told you how to care for your wound, follow your doctor's instructions. If you did not get instructions, follow this general advice: ¨ Wash the wound with clean water 2 times a day. Don't use hydrogen peroxide or alcohol, which can slow healing. ¨ You may cover the wound with a thin layer of petroleum jelly, such as Vaseline, and a nonstick bandage. ¨ Apply more petroleum jelly and replace the bandage as needed. · Be safe with medicines. Take pain medicines exactly as directed. ¨ If the doctor gave you a prescription medicine for pain, take it as prescribed. ¨ If you are not taking a prescription pain medicine, ask your doctor if you can take an over-the-counter medicine. To prevent cellulitis in the future · Try to prevent cuts, scrapes, or other injuries to your skin. Cellulitis most often occurs where there is a break in the skin. · If you get a scrape, cut, mild burn, or bite, wash the wound with clean water as soon as you can to help avoid infection. Don't use hydrogen peroxide or alcohol, which can slow healing. · If you have swelling in your legs (edema), support stockings and good skin care may help prevent leg sores and cellulitis. · Take care of your feet, especially if you have diabetes or other conditions that increase the risk of infection. Wear shoes and socks. Do not go barefoot. If you have athlete's foot or other skin problems on your feet, talk to your doctor about how to treat them. When should you call for help? Call your doctor now or seek immediate medical care if: 
? · You have signs that your infection is getting worse, such as: 
¨ Increased pain, swelling, warmth, or redness. ¨ Red streaks leading from the area. ¨ Pus draining from the area. ¨ A fever. ? · You get a rash. ? Watch closely for changes in your health, and be sure to contact your doctor if: 
? · You are not getting better after 1 day (24 hours). ? · You do not get better as expected. Where can you learn more? Go to http://joan-arvin.info/. Cliff Hsieh in the search box to learn more about \"Cellulitis: Care Instructions. \" Current as of: October 13, 2016 Content Version: 11.4 © 4869-9997 [employer]. Care instructions adapted under license by [employer] (which disclaims liability or warranty for this information). If you have questions about a medical condition or this instruction, always ask your healthcare professional. [place] [address] any warranty or liability for your use of this information. [employer] Announcement We are excited to announce that we are making your provider's discharge notes available to you in [employer]. You will see these notes when they are completed and signed by the physician that discharged you from your recent hospital stay.   If you have any questions or concerns about any information you see in [employer], please call the Health Information Department where you were seen or reach out to your Primary Care Provider for more information about your plan of care. Introducing [facility]! Dear Suzie Cunningham: Thank you for requesting a ADTZ account. Our records indicate that you already have an active ADTZ account. You can access your account anytime at [email or web address] Did you know that you can access your hospital and ER discharge instructions at any time in ADTZ? You can also review all of your test results from your hospital stay or ER visit. Additional Information If you have questions, please visit the Frequently Asked Questions section of the ADTZ website at [email or web address] Remember, ADTZ is NOT to be used for urgent needs. For medical emergencies, dial 911. Now available from your iPhone and Android! Providers Seen During Your Hospitalization Provider Specialty Primary office phone Brigette Azar MD Emergency Medicine [phone] Freddy Díaz MD Internal Medicine [phone]  
 Brynn Infante MD Hospitalist [phone] Your Primary Care Physician (PCP) Primary Care Physician Office Phone Office Fax Bakari Nia [phone] You are allergic to the following Allergen Reactions Sulfamethoxazole-Trimethoprim Rash L eye and L hand Ciprofloxacin Hives Per pcp records Codeine Hives Tolerates dilaudid, oxycodone Lyrica (Pregabalin) Hives Ultram (Tramadol) Hives Recent Documentation Height Weight BMI Smoking Status 1.854 m 94.3 kg 27.44 kg/m2 Current Every Day Smoker Emergency Contacts Name Discharge Info Relation Home Work Mobile Emely Pitch     [phone] Patient Belongings  The following personal items are in your possession at time of discharge: 
  Dental Appliances: None  Visual Aid: None      Home Medications: None Heather: None  Clothing: At bedside    Other Valuables: Cell Phone Please provide this summary of care documentation to your next provider. Signatures-by signing, you are acknowledging that this After Visit Summary has been reviewed with you and you have received a copy. Patient Signature:  ____________________________________________________________ Date:  ____________________________________________________________  
  
Marlyse Leaks Provider Signature:  ____________________________________________________________ Date:  ____________________________________________________________

## 2017-12-21 NOTE — ED NOTES
TRANSFER - OUT REPORT:    Verbal report given to Matt Meyer (name) on Shannan Thakur  being transferred to Ortho (unit) for routine progression of care       Report consisted of patients Situation, Background, Assessment and   Recommendations(SBAR). Information from the following report(s) SBAR, Kardex, ED Summary and MAR was reviewed with the receiving nurse. Lines:   Peripheral IV 12/21/17 Right Antecubital (Active)   Site Assessment Clean, dry, & intact 12/21/2017 11:43 AM   Phlebitis Assessment 0 12/21/2017 11:43 AM   Infiltration Assessment 0 12/21/2017 11:43 AM   Dressing Status Clean, dry, & intact 12/21/2017 11:43 AM   Dressing Type Tape;Transparent 12/21/2017 11:43 AM   Hub Color/Line Status Pink;Flushed 12/21/2017 11:43 AM   Action Taken Blood drawn 12/21/2017 11:43 AM        Opportunity for questions and clarification was provided.

## 2017-12-21 NOTE — IP AVS SNAPSHOT
[address]. 
[phone] Patient: Ignacio Eldridge MRN: [id number] :1965 A check valentine indicates which time of day the medication should be taken. My Medications START taking these medications Instructions Each Dose to Equal  
 Morning Noon Evening Bedtime HYDROmorphone 8 mg tablet Commonly known as:  DILAUDID Your last dose was: Your next dose is: Take 1 Tab by mouth every six (6) hours as needed. Max Daily Amount: 32 mg.  
 8 mg  
    
   
   
   
  
 polyethylene glycol 17 gram packet Commonly known as:  Brad Garcia Your last dose was: Your next dose is: Take 1 Packet by mouth daily. 17 g CONTINUE taking these medications Instructions Each Dose to Equal  
 Morning Noon Evening Bedtime  
 allopurinol 100 mg tablet Commonly known as:  Henri Martinez Your last dose was: Your next dose is: Take  by mouth daily. aspirin 81 mg chewable tablet Your last dose was: Your next dose is: Take 1 Tab by mouth daily. 81 mg COLCRYS 0.6 mg tablet Generic drug:  colchicine Your last dose was: Your next dose is: Take 0.6 mg by mouth daily. 0.6 mg  
    
   
   
   
  
 levothyroxine 75 mcg tablet Commonly known as:  SYNTHROID Your last dose was: Your next dose is: Take 75 mcg by mouth Daily (before breakfast). 75 mcg  
    
   
   
   
  
 lisinopril 20 mg tablet Commonly known as:  Arnulfo Beth Your last dose was: Your next dose is: Take 1 Tab by mouth daily. 20 mg METHADONE (BULK) Your last dose was: Your next dose is: Take 65 mg by mouth daily.  Per [employer] [phone] Dr. Guille Guillaume  Verified 12-22-17  
 65 mg Where to Get Your Medications Information on where to get these meds will be given to you by the nurse or doctor. ! Ask your nurse or doctor about these medications HYDROmorphone 8 mg tablet  
 polyethylene glycol 17 gram packet

## 2017-12-21 NOTE — ED PROVIDER NOTES
EMERGENCY DEPARTMENT HISTORY AND PHYSICAL EXAM      Date: 12/21/2017  Patient Name: Lianet Alberto    History of Presenting Illness     Chief Complaint   Patient presents with    Leg Swelling     pt has chronic wound that is draining x 2 days. LLE swelling, redness and clear drainage x 2 days. RLE  less red, but started getting red 2 days ago    Skin Problem       History Provided By: Patient    HPI: Lianet Alberto, 47 y.o. male with PMHx significant for HTN, sz, HLD, thyroid dz, aneurysm, thromboembolus, gout, TIA, presents ambulatory to the ED with cc of 2 days of gradually worsening and constant RLE swelling, rated moderate in severity and described as redness, tightness and swelling. Patient has LLE swelling, weeping and a chronic wound to the top of the L foot and notes only recently did he begin to experience RLE swelling. He reports worsening of his CC with dry heat from his furnace. Pt also c/o associated SOB. He has been seen by his PCP and Dr. Dain Breen of podiatry but has not been seen by a wound care clinic. He notes he does have a caregiver who comes to his residence on occasion. Pt walks with a cane. He denies h/o CHF or renal issues. Of note, pt was seen in another ER yesterday for the same sx and was referred to a specialist, but has not seen any specialists since. PCP: Robert Jones MD    There are no other complaints, changes, or physical findings at this time.     Current Facility-Administered Medications   Medication Dose Route Frequency Provider Last Rate Last Dose    sodium chloride (NS) flush 5-10 mL  5-10 mL IntraVENous Q8H Emanuel Holloway MD        sodium chloride (NS) flush 5-10 mL  5-10 mL IntraVENous PRN Jonnathan Shea MD        vancomycin (VANCOCIN) 1750 mg in  ml infusion  1,750 mg IntraVENous NOW Jonnathan Shea MD        piperacillin-tazobactam (ZOSYN) 3.375 g in 0.9% sodium chloride (MBP/ADV) 100 mL  3.375 g IntraVENous NOW Jonnathan Shea MD        0.9% sodium chloride (MBP/ADV) 0.9 % infusion              Current Outpatient Prescriptions   Medication Sig Dispense Refill    METHADONE HCL (METHADONE, BULK,) 125 mg by Does Not Apply route daily.  ketorolac (TORADOL) 10 mg tablet Take 1 Tab by mouth every six (6) hours as needed for Pain. 20 Tab 0    levothyroxine (SYNTHROID) 75 mcg tablet Take 75 mcg by mouth Daily (before breakfast).  colchicine (COLCRYS) 0.6 mg tablet Take 0.6 mg by mouth daily.  aspirin 81 mg chewable tablet Take 1 Tab by mouth daily. 30 Tab 0    lisinopril (PRINIVIL, ZESTRIL) 20 mg tablet Take 1 Tab by mouth daily. 27 Tab 3       Past History     Past Medical History:  Past Medical History:   Diagnosis Date    Aneurysm ([facility] [address]     (with stroke)    Gout     Hypercholesterolemia     Hypertension     Lesion of bladder     Seizures ([facility] [address]     Stroke ([facility] [address] 2006    left sided weakness    Thromboembolus ([facility] [address]     Thyroid disease     TIA (transient ischemic attack) 2012       Past Surgical History:  Past Surgical History:   Procedure Laterality Date    HX ORTHOPAEDIC      KNEE ARTHROSCP HARV      left knee       Family History:  Family History   Problem Relation Age of Onset    Diabetes Father     Diabetes Sister     Diabetes Brother        Social History:  Social History   Substance Use Topics    Smoking status: Current Every Day Smoker     Packs/day: 1.00    Smokeless tobacco: Never Used    Alcohol use 8.4 oz/week     14 Cans of beer per week       Allergies: Allergies   Allergen Reactions    Sulfamethoxazole-Trimethoprim Rash     L eye and L hand    Ciprofloxacin Hives     Per pcp records    Codeine Hives     Tolerates dilaudid, oxycodone    Lyrica [Pregabalin] Hives    Ultram [Tramadol] Hives         Review of Systems   Review of Systems   Constitutional: Negative. Negative for chills and fever. HENT: Negative. Negative for congestion, rhinorrhea and sinus pressure. Eyes: Negative.   Negative for discharge and redness. Respiratory: Positive for shortness of breath. Negative for chest tightness. Cardiovascular: Positive for leg swelling. Negative for chest pain. Gastrointestinal: Negative. Negative for abdominal pain and blood in stool. Endocrine: Negative. Genitourinary: Negative. Negative for flank pain and hematuria. Musculoskeletal: Negative. Negative for back pain. Skin: Positive for wound (chronic). Negative for rash. Neurological: Negative. Negative for dizziness, seizures, weakness, numbness and headaches. Hematological: Negative. All other systems reviewed and are negative. Physical Exam   Physical Exam   Constitutional: He is oriented to person, place, and time. He appears well-developed and well-nourished. No distress. HENT:   Head: Normocephalic and atraumatic. Nose: Nose normal.   Mouth/Throat: No oropharyngeal exudate. Eyes: Conjunctivae and EOM are normal. Pupils are equal, round, and reactive to light. No scleral icterus. Neck: Normal range of motion. Neck supple. No JVD present. No thyromegaly present. Cardiovascular: Normal rate, regular rhythm, normal heart sounds, intact distal pulses and normal pulses. PMI is not displaced. Exam reveals no gallop and no friction rub. No murmur heard. Pulmonary/Chest: Effort normal and breath sounds normal. No stridor. No respiratory distress. He has no decreased breath sounds. He has no wheezes. He has no rhonchi. He has no rales. He exhibits no tenderness. Abdominal: Soft. Normal aorta and bowel sounds are normal. He exhibits no distension, no abdominal bruit and no mass. There is no hepatosplenomegaly. There is no tenderness. There is no rebound, no guarding and no CVA tenderness. No hernia. Musculoskeletal:        Legs:       Feet:    Neurological: He is alert and oriented to person, place, and time. He has normal reflexes. He displays no atrophy and no tremor. No cranial nerve deficit or sensory deficit.  He exhibits normal muscle tone. He displays no seizure activity. Coordination normal. GCS eye subscore is 4. GCS verbal subscore is 5. GCS motor subscore is 6. Reflex Scores:       Patellar reflexes are 2+ on the right side and 2+ on the left side. Skin: Skin is warm. No rash noted. He is not diaphoretic. No erythema. Nursing note and vitals reviewed. Diagnostic Study Results     Labs -     Recent Results (from the past 12 hour(s))   EKG, 12 LEAD, INITIAL    Collection Time: 12/21/17 11:24 AM   Result Value Ref Range    Ventricular Rate 63 BPM    Atrial Rate 63 BPM    P-R Interval 180 ms    QRS Duration 96 ms    Q-T Interval 458 ms    QTC Calculation (Bezet) 468 ms    Calculated P Axis 57 degrees    Calculated R Axis 10 degrees    Calculated T Axis 24 degrees    Diagnosis       Normal sinus rhythm  Minimal voltage criteria for LVH, may be normal variant  When compared with ECG of 09-OCT-2016 14:24,  No significant change was found  Confirmed by Frederic Murray ([id number]) on 12/21/2017 11:59:28 AM     CBC WITH AUTOMATED DIFF    Collection Time: 12/21/17 11:28 AM   Result Value Ref Range    WBC 12.7 (H) 4.1 - 11.1 K/uL    RBC 4.26 4.10 - 5.70 M/uL    HGB 11.8 (L) 12.1 - 17.0 g/dL    HCT 36.6 36.6 - 50.3 %    MCV 85.9 80.0 - 99.0 FL    MCH 27.7 26.0 - 34.0 PG    MCHC 32.2 30.0 - 36.5 g/dL    RDW 16.9 (H) 11.5 - 14.5 %    PLATELET 964 897 - 601 K/uL    NEUTROPHILS 75 32 - 75 %    LYMPHOCYTES 17 12 - 49 %    MONOCYTES 6 5 - 13 %    EOSINOPHILS 2 0 - 7 %    BASOPHILS 0 0 - 1 %    ABS. NEUTROPHILS 9.6 (H) 1.8 - 8.0 K/UL    ABS. LYMPHOCYTES 2.1 0.8 - 3.5 K/UL    ABS. MONOCYTES 0.8 0.0 - 1.0 K/UL    ABS. EOSINOPHILS 0.2 0.0 - 0.4 K/UL    ABS.  BASOPHILS 0.0 0.0 - 0.1 K/UL   METABOLIC PANEL, COMPREHENSIVE    Collection Time: 12/21/17 11:28 AM   Result Value Ref Range    Sodium 138 136 - 145 mmol/L    Potassium 3.8 3.5 - 5.1 mmol/L    Chloride 105 97 - 108 mmol/L    CO2 25 21 - 32 mmol/L    Anion gap 8 5 - 15 mmol/L Glucose 101 (H) 65 - 100 mg/dL    BUN 6 6 - 20 MG/DL    Creatinine 0.69 (L) 0.70 - 1.30 MG/DL    BUN/Creatinine ratio 9 (L) 12 - 20      GFR est AA >60 >60 ml/min/1.73m2    GFR est non-AA >60 >60 ml/min/1.73m2    Calcium 8.6 8.5 - 10.1 MG/DL    Bilirubin, total 0.6 0.2 - 1.0 MG/DL    ALT (SGPT) 27 12 - 78 U/L    AST (SGOT) 20 15 - 37 U/L    Alk. phosphatase 240 (H) 45 - 117 U/L    Protein, total 7.0 6.4 - 8.2 g/dL    Albumin 3.5 3.5 - 5.0 g/dL    Globulin 3.5 2.0 - 4.0 g/dL    A-G Ratio 1.0 (L) 1.1 - 2.2     LACTIC ACID    Collection Time: 12/21/17 11:28 AM   Result Value Ref Range    Lactic acid 0.9 0.4 - 2.0 MMOL/L   PROTHROMBIN TIME + INR    Collection Time: 12/21/17 11:28 AM   Result Value Ref Range    INR 1.1 0.9 - 1.1      Prothrombin time 11.4 (H) 9.0 - 11.1 sec   PTT    Collection Time: 12/21/17 11:28 AM   Result Value Ref Range    aPTT 29.3 22.1 - 32.5 sec    aPTT, therapeutic range     58.0 - 77.0 SECS   NT-PRO BNP    Collection Time: 12/21/17 11:28 AM   Result Value Ref Range    NT pro-BNP 79 0 - 125 PG/ML   CK W/ CKMB & INDEX    Collection Time: 12/21/17 11:28 AM   Result Value Ref Range    CK 77 39 - 308 U/L    CK - MB 1.1 <3.6 NG/ML    CK-MB Index 1.4 0 - 2.5     SED RATE (ESR)    Collection Time: 12/21/17 11:28 AM   Result Value Ref Range    Sed rate, automated 17 0 - 20 mm/hr       Radiologic Studies -   XR CHEST PORT   Final Result        CT Results  (Last 48 hours)    None        CXR Results  (Last 48 hours)               12/21/17 1110  XR CHEST PORT Final result    Impression:  IMPRESSION: No acute cardiopulmonary disease. Narrative:  INDICATION: Shortness of breath. Portable AP upright view of the chest.       Direct comparison made to prior chest x-ray dated October 9, 2016. Cardiomediastinal silhouette is within normal limits. . Lungs are clear   bilaterally. Pleural spaces are normal. There are several chronic left-sided rib   fracture deformities. Medical Decision Making   I am the first provider for this patient. I reviewed the vital signs, available nursing notes, past medical history, past surgical history, family history and social history. Vital Signs-Reviewed the patient's vital signs. Patient Vitals for the past 12 hrs:   Pulse Resp BP SpO2   12/21/17 1100 - - (!) 152/98 96 %   12/21/17 1048 - - - 98 %   12/21/17 1047 72 16 (!) 178/98 97 %       Pulse Oximetry Analysis - 97% on RA    Cardiac Monitor:   Rate: 75 bpm  Rhythm: Normal Sinus Rhythm      EKG interpretation: (Preliminary) 1124  Rhythm: normal sinus rhythm; and regular . Rate (approx.): 63; Axis: normal; RI interval: normal; QRS interval: normal ; ST/T wave: normal; Other findings: minimal voltage criteria for LVH. Written by Clare Kennedy ED Scribe, as dictated by Casper Anthony MD.    Records Reviewed: Nursing Notes and Old Medical Records    Provider Notes (Medical Decision Making):   DDx: cellulitis, fasciitis, osteomyelitis, DVT, CHF  Imp/plan: h/o stroke, chronic edema, and chronic poorly healing wound on his L foot. Here with concerns about redness to RLE. Has had issues with home health care. Seen at another ER yesterday and discharged. Will check labs, have case management evaluate. Faustina need admission if there is evidence of infection for IV abx. ED Course:   Initial assessment performed. The patients presenting problems have been discussed, and they are in agreement with the care plan formulated and outlined with them. I have encouraged them to ask questions as they arise throughout their visit. 1:46 PM   Updated and counseled on available results, recommended admission for continued care and abx, specialty care, addressed questions. Patient expresses understanding and agrees with plan.    Written by MAMADOU Ordazibe, as dictated by Casper Anthony MD.       Consult Note:   2:11 PM  Casper Anthony MD spoke with Krystyna Murphy MD   Specialty: Hospitalist  Discussed pt's hx, disposition, and available diagnostic and imaging results. Reviewed care plans. Consultant will evaluate pt for admission. Written by Henry Brown, ED Scribe, as dictated by Reji Cui MD.     Critical Care Time: none    Disposition:    1. Admit to hospitalist.    Admit Note:  1:46 PM  Pt is being admitted by Dr. Manley, hospitalist. The results of their tests and reason(s) for their admission have been discussed with pt and/or available family. They convey agreement and understanding for the need to be admitted and for admission diagnosis. PLAN:  1. Current Discharge Medication List        2. Follow-up Information     None        Return to ED if worse     Diagnosis     Clinical Impression:   1. Cellulitis of lower extremity, unspecified laterality        Attestations: This note is prepared by Henry Brown, acting as Scribe for Reji Cui MD.       The scribe's documentation has been prepared under my direction and personally reviewed by me in its entirety. I confirm that the note above accurately reflects all work, treatment, procedures, and medical decision making performed by me.

## 2017-12-21 NOTE — ED NOTES
Complaint of RLE redness and swelling. Pt has chronic LLE redness and swelling x 1 year, weeping started 2 days ago. Two days ago the RLE started to turn red as well and that concerns him. \"I don't want to lose my legs\". Left foot is red, swollen and pruney. There is a small wound on the pad of the left great toe. The LLE is weeping clear fluid.

## 2017-12-22 ENCOUNTER — APPOINTMENT (OUTPATIENT)
Dept: MRI IMAGING | Age: 52
DRG: 383 | End: 2017-12-22
Attending: INTERNAL MEDICINE
Payer: MEDICAID

## 2017-12-22 LAB
ANION GAP SERPL CALC-SCNC: 3 MMOL/L (ref 5–15)
BUN SERPL-MCNC: 6 MG/DL (ref 6–20)
BUN/CREAT SERPL: 8 (ref 12–20)
CALCIUM SERPL-MCNC: 8.2 MG/DL (ref 8.5–10.1)
CHLORIDE SERPL-SCNC: 107 MMOL/L (ref 97–108)
CO2 SERPL-SCNC: 31 MMOL/L (ref 21–32)
CREAT SERPL-MCNC: 0.76 MG/DL (ref 0.7–1.3)
ERYTHROCYTE [DISTWIDTH] IN BLOOD BY AUTOMATED COUNT: 17.1 % (ref 11.5–14.5)
EST. AVERAGE GLUCOSE BLD GHB EST-MCNC: 97 MG/DL
GLUCOSE SERPL-MCNC: 107 MG/DL (ref 65–100)
HBA1C MFR BLD: 5 % (ref 4.2–6.3)
HCT VFR BLD AUTO: 35.2 % (ref 36.6–50.3)
HGB BLD-MCNC: 11.5 G/DL (ref 12.1–17)
MCH RBC QN AUTO: 28.9 PG (ref 26–34)
MCHC RBC AUTO-ENTMCNC: 32.7 G/DL (ref 30–36.5)
MCV RBC AUTO: 88.4 FL (ref 80–99)
PLATELET # BLD AUTO: 264 K/UL (ref 150–400)
POTASSIUM SERPL-SCNC: 3.9 MMOL/L (ref 3.5–5.1)
RBC # BLD AUTO: 3.98 M/UL (ref 4.1–5.7)
SODIUM SERPL-SCNC: 141 MMOL/L (ref 136–145)
WBC # BLD AUTO: 12.1 K/UL (ref 4.1–11.1)

## 2017-12-22 PROCEDURE — 87077 CULTURE AEROBIC IDENTIFY: CPT | Performed by: INTERNAL MEDICINE

## 2017-12-22 PROCEDURE — 85027 COMPLETE CBC AUTOMATED: CPT | Performed by: INTERNAL MEDICINE

## 2017-12-22 PROCEDURE — 74011250637 HC RX REV CODE- 250/637: Performed by: INTERNAL MEDICINE

## 2017-12-22 PROCEDURE — 93306 TTE W/DOPPLER COMPLETE: CPT

## 2017-12-22 PROCEDURE — 83036 HEMOGLOBIN GLYCOSYLATED A1C: CPT | Performed by: INTERNAL MEDICINE

## 2017-12-22 PROCEDURE — 87186 SC STD MICRODIL/AGAR DIL: CPT | Performed by: INTERNAL MEDICINE

## 2017-12-22 PROCEDURE — 36415 COLL VENOUS BLD VENIPUNCTURE: CPT | Performed by: INTERNAL MEDICINE

## 2017-12-22 PROCEDURE — 93922 UPR/L XTREMITY ART 2 LEVELS: CPT

## 2017-12-22 PROCEDURE — 74011250636 HC RX REV CODE- 250/636: Performed by: INTERNAL MEDICINE

## 2017-12-22 PROCEDURE — 80048 BASIC METABOLIC PNL TOTAL CA: CPT | Performed by: INTERNAL MEDICINE

## 2017-12-22 PROCEDURE — 74011000258 HC RX REV CODE- 258: Performed by: INTERNAL MEDICINE

## 2017-12-22 PROCEDURE — 87147 CULTURE TYPE IMMUNOLOGIC: CPT | Performed by: INTERNAL MEDICINE

## 2017-12-22 PROCEDURE — 87205 SMEAR GRAM STAIN: CPT | Performed by: INTERNAL MEDICINE

## 2017-12-22 PROCEDURE — 74011000250 HC RX REV CODE- 250: Performed by: INTERNAL MEDICINE

## 2017-12-22 PROCEDURE — 65660000000 HC RM CCU STEPDOWN

## 2017-12-22 RX ORDER — FAMOTIDINE 20 MG/1
20 TABLET, FILM COATED ORAL 2 TIMES DAILY
Status: DISCONTINUED | OUTPATIENT
Start: 2017-12-22 | End: 2018-01-13 | Stop reason: HOSPADM

## 2017-12-22 RX ORDER — HYDROMORPHONE HYDROCHLORIDE 2 MG/ML
0.5 INJECTION, SOLUTION INTRAMUSCULAR; INTRAVENOUS; SUBCUTANEOUS
Status: DISCONTINUED | OUTPATIENT
Start: 2017-12-22 | End: 2017-12-24

## 2017-12-22 RX ADMIN — Medication 10 ML: at 22:00

## 2017-12-22 RX ADMIN — Medication 10 ML: at 13:17

## 2017-12-22 RX ADMIN — HYDROMORPHONE HYDROCHLORIDE 0.5 MG: 2 INJECTION, SOLUTION INTRAMUSCULAR; INTRAVENOUS; SUBCUTANEOUS at 17:04

## 2017-12-22 RX ADMIN — ASPIRIN 81 MG 81 MG: 81 TABLET ORAL at 08:40

## 2017-12-22 RX ADMIN — FAMOTIDINE 20 MG: 20 TABLET ORAL at 17:05

## 2017-12-22 RX ADMIN — HYDROMORPHONE HYDROCHLORIDE 1 MG: 2 INJECTION, SOLUTION INTRAMUSCULAR; INTRAVENOUS; SUBCUTANEOUS at 08:46

## 2017-12-22 RX ADMIN — VANCOMYCIN HYDROCHLORIDE 1250 MG: 10 INJECTION, POWDER, LYOPHILIZED, FOR SOLUTION INTRAVENOUS at 21:57

## 2017-12-22 RX ADMIN — HYDROMORPHONE HYDROCHLORIDE 1 MG: 2 INJECTION, SOLUTION INTRAMUSCULAR; INTRAVENOUS; SUBCUTANEOUS at 05:01

## 2017-12-22 RX ADMIN — METHADONE HYDROCHLORIDE 65 MG: 10 TABLET ORAL at 05:05

## 2017-12-22 RX ADMIN — SODIUM CHLORIDE: 900 INJECTION, SOLUTION INTRAVENOUS at 12:33

## 2017-12-22 RX ADMIN — FAMOTIDINE 20 MG: 10 INJECTION, SOLUTION INTRAVENOUS at 08:41

## 2017-12-22 RX ADMIN — Medication 10 ML: at 05:01

## 2017-12-22 RX ADMIN — SODIUM CHLORIDE: 900 INJECTION, SOLUTION INTRAVENOUS at 03:03

## 2017-12-22 RX ADMIN — LISINOPRIL 20 MG: 20 TABLET ORAL at 08:40

## 2017-12-22 RX ADMIN — VANCOMYCIN HYDROCHLORIDE 1250 MG: 10 INJECTION, POWDER, LYOPHILIZED, FOR SOLUTION INTRAVENOUS at 13:17

## 2017-12-22 RX ADMIN — HYDROMORPHONE HYDROCHLORIDE 1 MG: 2 INJECTION, SOLUTION INTRAMUSCULAR; INTRAVENOUS; SUBCUTANEOUS at 11:08

## 2017-12-22 RX ADMIN — LEVOTHYROXINE SODIUM 75 MCG: 75 TABLET ORAL at 08:40

## 2017-12-22 RX ADMIN — HYDROMORPHONE HYDROCHLORIDE 1 MG: 2 INJECTION, SOLUTION INTRAMUSCULAR; INTRAVENOUS; SUBCUTANEOUS at 01:48

## 2017-12-22 RX ADMIN — SODIUM CHLORIDE: 900 INJECTION, SOLUTION INTRAVENOUS at 20:03

## 2017-12-22 RX ADMIN — VANCOMYCIN HYDROCHLORIDE 1250 MG: 10 INJECTION, POWDER, LYOPHILIZED, FOR SOLUTION INTRAVENOUS at 05:05

## 2017-12-22 RX ADMIN — HYDROMORPHONE HYDROCHLORIDE 0.5 MG: 2 INJECTION, SOLUTION INTRAMUSCULAR; INTRAVENOUS; SUBCUTANEOUS at 23:05

## 2017-12-22 RX ADMIN — ENOXAPARIN SODIUM 40 MG: 40 INJECTION SUBCUTANEOUS at 17:05

## 2017-12-22 RX ADMIN — HYDROMORPHONE HYDROCHLORIDE 0.5 MG: 2 INJECTION, SOLUTION INTRAMUSCULAR; INTRAVENOUS; SUBCUTANEOUS at 20:02

## 2017-12-22 RX ADMIN — HYDROMORPHONE HYDROCHLORIDE 0.5 MG: 2 INJECTION, SOLUTION INTRAMUSCULAR; INTRAVENOUS; SUBCUTANEOUS at 14:07

## 2017-12-22 NOTE — PROGRESS NOTES
1935: Bedside and Verbal shift change report given to StyleQ (oncoming nurse) by Kimberlee Kovacs (offgoing nurse). Report included the following information SBAR. Safety teaching done. Admission assessment completed and podiatry consult called per offgoing RN. 2012: Patient refused full assessment of his right foot, as he does not want it to be touched. Visual assessment completed of area visible. 2035: Dr. Jaden Hooker called and questioned answered. Orders received. Notified him also of patient current pain level and management. Dr. Jaden Hooker stated to put the xray order under Dr. Viktor Duarte. 2058: Patient going down to x-ray. Tele box gotten and to be placed once patient back up to floor. Patient may travel without tele or nurse per order. 2140: Patient back up. Pain medication given and Tele placed. 2202: Spoke with pharmacy and Zosyn and Vancomycin can be run together. 2339: Patient was sleeping when entered the room with audible snoring. When awakened by nurse, reported 10/10 pain. Medication given per order. 2353: Asked pharmacy to refill Vancomycin for 0600 dose. 0236: Patient's pain has gone down from a 10 to a 9. He has received his pain medication Q2H, and the nurse even woke him up to give it to him. His outward signs of pain are inconsistent with his assigned number. For instance, the patient is laughing at a tv show while reporting a 9/10 pain, or when is sleeping and then awakened by the nurse, his report is of 10/10 pain and then he drifts back to sleep. Will continue to dose as frequently as the MD ordered, as long the patient verbally reports pain and the medication is safe to give. Will continue to monitor.    0301: pst and 2 lav drawn. 0600: Patient has been sleeping most of the night, despite high levels of stated pain. He refuses to have the foot touched, cleaned, or dressed. It continues to drain thick foul purulent drainage.  He has received a total of 5mg dilaudid during the shift with pain at its lowest was 8/10. He has become increasing drowsy since about 0200, and even slept through the potential 350am dose of medication. He is manipulative, stating such things as \"I cannot pee unless I get more pain medication\", when he had received pain medication in the 20 minutes prior. He does not respond to efforts to reduce pain, such as the nurse's request for him to keep his foot from dangling off the bed and place it on the pillow.

## 2017-12-22 NOTE — PROGRESS NOTES
Hospitalist Progress Note    NAME: Michelle Roman   :  1965   MRN:  [id number]     Admit date: 2017    Today's date: 17    PCP: Carnell Ravens, MD Sharyle Fujisawa, M.D. Cell [phone]      Assessment / Plan:  Left foot cellulitis POA with purulent drainage  Chronic non healing L Foot ulcer/wound POA  Leukocytosis POA  WBC= 12.7 without other SIRS criteria, normal lactate  Left foot dusky red with wound on 2nd and 3rd toes with purulent drainage      Some of the other changes on the calfs look like chronic venous stasis changes  LE dopplers negative for DVT  IV zosyn + Vanc day 2  Follow Blood Cx, culture drainage  Wound care consult  Pain management  Says he was recently admitted at [facility] several weeks, ? Had MRI foot, will try to get records today  Will repeat MRI foot with the purulent drainage  Podiatry to see    Essential HTN POA  Left Hemiparesis from Old CVA POA  Chronic Pain syndrome on maintenance Methadone at [facility]  Hyperlipidemia  Cont baseline methadone 65 mg daily per pt  Cont ASA  Cont lisinopril     Code Status: Full  Surrogate Decision Maker: sister Kuldip Hubbard # [phone]     DVT Prophylaxis: SQ Lovenox  GI Prophylaxis: pepcid     Baseline: pt is functional at baseline- uses cane to ambulate at baseline    Body mass index is 27.44 kg/(m^2). Subjective:     Chief Complaint / Reason for Physician Visit f/u left foot cellulitis  \"My legs are swollen\". Intermittent SOB, legs more swollen that they have been in past  Mod pain in feet L > R, takes methadone 65 mg from a clinic per his report  Discussed with RN events overnight.      Review of Systems:  Symptom Y/N Comments  Symptom Y/N Comments   Fever/Chills n   Chest Pain n    Poor Appetite    Edema     Cough n   Abdominal Pain n    Sputum    Joint Pain     SOB/BECK y   Headache     Nausea/vomit n   Tolerating PT/OT     Diarrhea n   Tolerating Diet y    Constipation    Other Could NOT obtain due to:      Objective:     VITALS:   Last 24hrs VS reviewed since prior progress note. Most recent are:  Patient Vitals for the past 24 hrs:   Temp Pulse Resp BP SpO2   12/22/17 0745 - - - - 94 %   12/22/17 0701 98.3 °F (36.8 °C) 72 18 136/87 94 %   12/22/17 0459 - - - 130/89 -   12/22/17 0322 98.5 °F (36.9 °C) 65 18 95/63 94 %   12/21/17 2150 98 °F (36.7 °C) (!) 58 18 135/68 97 %   12/21/17 1430 - - - 123/82 96 %   12/21/17 1400 - - - (!) 160/96 97 %   12/21/17 1300 - - - 150/85 100 %   12/21/17 1100 - - - (!) 152/98 96 %   12/21/17 1048 - - - - 98 %   12/21/17 1047 - 72 16 (!) 178/98 97 %       Intake/Output Summary (Last 24 hours) at 12/22/17 0903  Last data filed at 12/22/17 0655   Gross per 24 hour   Intake              500 ml   Output             1850 ml   Net            -1350 ml        Wt Readings from Last 12 Encounters:   12/21/17 94.3 kg (208 lb)   08/13/17 94.3 kg (208 lb)   01/27/17 99.2 kg (218 lb 9.6 oz)   01/06/17 99.3 kg (219 lb)   12/13/16 105.1 kg (231 lb 12.8 oz)   10/14/16 102 kg (224 lb 13.9 oz)   09/10/16 108.4 kg (239 lb)   06/02/16 108.5 kg (239 lb 4 oz)   11/09/15 101.6 kg (224 lb)   02/12/13 109.8 kg (242 lb)   02/07/13 108.9 kg (240 lb)   01/30/13 104.3 kg (230 lb)       PHYSICAL EXAM:  General: WD, WN. Alert, cooperative, no acute distress    EENT:  PERRL. Anicteric sclerae. MMM  Neck:  No meningismus, no thyromegaly  Resp:  CTA bilaterally, no wheezing or rales.   No accessory muscle use  CV:  Regular  rhythm,  2+  edema  GI:  Soft, Non distended, Non tender.  +Bowel sounds, no rebound  LN:  No cervical MERARY, small left inguinal MERARY  Neurologic:  Alert and oriented X 3, normal speech, left hemiparesis  Psych:   Good insight. Not anxious nor agitated  Skin:  Dusky erythema over distal left foot with eschar over distal 2nd, 3rd toes    Lots of purulent drainage  No jaundice            Reviewed most current lab test results and cultures  YES  Reviewed most current radiology test results   YES  Review and summation of old records today    NO  Reviewed patient's current orders and MAR    YES  PMH/SH reviewed - no change compared to H&P  ________________________________________________________________________  Care Plan discussed with:    Comments   Patient x    Family      RN x    Care Manager     Consultant                        Multidiciplinary team rounds were held today with , nursing, pharmacist and clinical coordinator. Patient's plan of care was discussed; medications were reviewed and discharge planning was addressed. ________________________________________________________________________  Total NON critical care TIME:  35  Minutes    Total CRITICAL CARE TIME Spent:   Minutes non procedure based      Comments   >50% of visit spent in counseling and coordination of care     ________________________________________________________________________  Brynn Infante MD     Procedures: see electronic medical records for all procedures/Xrays and details which were not copied into this note but were reviewed prior to creation of Plan. LABS:  I reviewed today's most current labs and imaging studies.   Pertinent labs include:  Recent Labs      12/22/17   0301  12/21/17   1128   WBC  12.1*  12.7*   HGB  11.5*  11.8*   HCT  35.2*  36.6   PLT  264  319     Recent Labs      12/22/17   0301  12/21/17   1128   NA  141  138   K  3.9  3.8   CL  107  105   CO2  31  25   GLU  107*  101*   BUN  6  6   CREA  0.76  0.69*   CA  8.2*  8.6   ALB   --   3.5   TBILI   --   0.6   SGOT   --   20   ALT   --   27   INR   --   1.1

## 2017-12-22 NOTE — PROGRESS NOTES
Problem: Falls - Risk of  Goal: *Absence of Falls  Document Jordon Fall Risk and appropriate interventions in the flowsheet. Outcome: Progressing Towards Goal  Fall Risk Interventions:  Mobility Interventions: Communicate number of staff needed for ambulation/transfer, OT consult for ADLs, Patient to call before getting OOB, PT Consult for mobility concerns, PT Consult for assist device competence, Strengthening exercises (ROM-active/passive), Utilize walker, cane, or other assitive device, Utilize gait belt for transfers/ambulation         Medication Interventions: Bed/chair exit alarm, Evaluate medications/consider consulting pharmacy, Patient to call before getting OOB, Teach patient to arise slowly, Utilize gait belt for transfers/ambulation                  Problem: Patient Education: Go to Patient Education Activity  Goal: Patient/Family Education  Outcome: Progressing Towards Goal  Educated patient on call bell and appropriate room safety protocols.

## 2017-12-22 NOTE — INTERDISCIPLINARY ROUNDS
Patient's course of treatment, hospital stay and discharge planning were discussed by the surgical interdisciplinary team which includes, nursing, nurse manager, nurse practitioner, care management, quality department representative, rehab therapy representative, rehab liason, nutrition and pharmacy.

## 2017-12-22 NOTE — PROGRESS NOTES
Assessed patient for suicide risks. Patient states he has depression \"sometimes\", but states he \"absolutely does not\" have a plan to harm himself. Patient also states that he will not harm himself. Patient denies any suicidal ideations.

## 2017-12-22 NOTE — PROGRESS NOTES
Podiatry Progress Note    Admit Date: 12/21/2017  Hospital day 2    Subjective:     Patient was admitted one day ago with chronic wound left foot. Patient states both legs are painful and wound left foot has been present for  more than one year.   States he has been cleaning the foot with saline and applying gauze daily    Current Facility-Administered Medications   Medication Dose Route Frequency    methadone (DOLOPHINE) tablet 65 mg  65 mg Oral DAILY    famotidine (PEPCID) tablet 20 mg  20 mg Oral BID    [START ON 12/23/2017] Please draw a Vancomycin trough level before the 5am dose 12-23  1 Each Other ONCE    HYDROmorphone (PF) (DILAUDID) injection 0.5 mg  0.5 mg IntraVENous Q3H PRN    sodium chloride (NS) flush 5-10 mL  5-10 mL IntraVENous Q8H    sodium chloride (NS) flush 5-10 mL  5-10 mL IntraVENous PRN    aspirin chewable tablet 81 mg  81 mg Oral DAILY    levothyroxine (SYNTHROID) tablet 75 mcg  75 mcg Oral ACB    lisinopril (PRINIVIL, ZESTRIL) tablet 20 mg  20 mg Oral DAILY    sodium chloride (NS) flush 5-10 mL  5-10 mL IntraVENous Q8H    sodium chloride (NS) flush 5-10 mL  5-10 mL IntraVENous PRN    naloxone (NARCAN) injection 0.4 mg  0.4 mg IntraVENous PRN    ondansetron (ZOFRAN) injection 4 mg  4 mg IntraVENous Q4H PRN    enoxaparin (LOVENOX) injection 40 mg  40 mg SubCUTAneous Q24H    vancomycin (VANCOCIN) 1250 mg in  ml infusion  1,250 mg IntraVENous Q8H    piperacillin-tazobactam 3.375 g in 0.9% sodium chloride 100 mL IVPB   IntraVENous Q8H        Review of Systems  See admitting H&P    Objective:     Patient Vitals for the past 8 hrs:   BP Temp Pulse Resp SpO2   12/22/17 1404 152/90 97.9 °F (36.6 °C) 63 18 98 %   12/22/17 1049 145/87 98.1 °F (36.7 °C) 91 18 96 %     12/22 0701 - 12/22 1900  In: -   Out: 700 [Urine:700]  12/20 1901 - 12/22 0700  In: 500 [I.V.:500]  Out: 1850 [Urine:1850]    Physical Exam: LOWER LEGS  Non palpable pedal pulses   Lower legs are erythematous , edematous and painful  Dorsal base of toes 1,2,3,left foot with draining wound that measures 3x2cm and with dried necrotic layer    EUGENE\"s suggest severe arterial disease    MRI pending    Xray left foot does not show any signs of oste myelitis    All labs reviewed; WBC's 12.1                  Data Review   Recent Results (from the past 24 hour(s))   HEMOGLOBIN A1C WITH EAG    Collection Time: 12/22/17  3:01 AM   Result Value Ref Range    Hemoglobin A1c 5.0 4.2 - 6.3 %    Est. average glucose 97 mg/dL   CBC W/O DIFF    Collection Time: 12/22/17  3:01 AM   Result Value Ref Range    WBC 12.1 (H) 4.1 - 11.1 K/uL    RBC 3.98 (L) 4.10 - 5.70 M/uL    HGB 11.5 (L) 12.1 - 17.0 g/dL    HCT 35.2 (L) 36.6 - 50.3 %    MCV 88.4 80.0 - 99.0 FL    MCH 28.9 26.0 - 34.0 PG    MCHC 32.7 30.0 - 36.5 g/dL    RDW 17.1 (H) 11.5 - 14.5 %    PLATELET 123 135 - 213 K/uL   METABOLIC PANEL, BASIC    Collection Time: 12/22/17  3:01 AM   Result Value Ref Range    Sodium 141 136 - 145 mmol/L    Potassium 3.9 3.5 - 5.1 mmol/L    Chloride 107 97 - 108 mmol/L    CO2 31 21 - 32 mmol/L    Anion gap 3 (L) 5 - 15 mmol/L    Glucose 107 (H) 65 - 100 mg/dL    BUN 6 6 - 20 MG/DL    Creatinine 0.76 0.70 - 1.30 MG/DL    BUN/Creatinine ratio 8 (L) 12 - 20      GFR est AA >60 >60 ml/min/1.73m2    GFR est non-AA >60 >60 ml/min/1.73m2    Calcium 8.2 (L) 8.5 - 10.1 MG/DL           Assessment:     Principal Problem:    Bilateral cellulitis of lower leg (12/21/2017)    Active Problems:    Cerebral infarction ([address] (12/12/2012)      HTN (hypertension) (12/21/2017)        Plan:   Wound care ordered; xeroform/ ca alginate and dry dressing daily. I feel that this is more a vascular issue.   This patient needs revascularization priot r to any type of debridement of tissue or bone

## 2017-12-22 NOTE — PROGRESS NOTES
Pharmacy Automatic Renal Dosing Protocol - Antimicrobials  Indication for Antimicrobials: Skin and Soft Tissue Infection (foot)    Current Regimen of Each Antimicrobial:  Piperacillin Tazobactam 3.375 grams Q8H  (Start Date ; Day # 2)  Vancomycin 1750 mg x 1 then 1250 mg Q8H Start Date ; Day # 2)    Previous Antimicrobial Therapy:  Vancomycin Goal Level: 15-20    Measured / Extrapolated Vancomycin Level:   Significant Cultures:   17 - Blood - pending    Labs:  Recent Labs      17   0301  17   1128   CREA  0.76  0.69*   BUN  6  6   WBC  12.1*  12.7*     Temp (24hrs), Av.3 °F (36.8 °C), Min:98 °F (36.7 °C), Max:98.5 °F (36.9 °C)    Paralysis, amputations, malnutrition: No  Creatinine Clearance (mL/min) or Dialysis: > 100    Impression/Plan:   - Afebrile, deep wound infection with purulent drainage (changed target trough to 15-20 mcg/ml)  - Recalculated expected trough based on present CrCl 15 mcg/ml with an AUC of 402, which is within desired limits. - continue to watch for renal changes, especially with concomitant Zosyn therapy  - MRI pending, podiatry to see  - will check trough level before the 5am dose 17  BMP Daily. Pharmacy will follow daily and adjust medications as appropriate for renal function and/or serum levels.     Thank you,  Michael Lyle, [facility]

## 2017-12-22 NOTE — PROCEDURES
[facility]  *** FINAL REPORT ***    Name: Aleoj Brown  MRN: [id number]    Inpatient  : 21 Dec 1965  HIS Order #: [id number]  [id number] [facility] Visit #: [id number]  Date: 22 Dec 2017    TYPE OF TEST: Peripheral Arterial Testing    REASON FOR TEST  Extremity ulceration (right side)    Right Leg  Doppler:    Normal  Ankle/Brachial:    Left Leg  Doppler:    Normal  Ankle/Brachial:    INTERPRETATION/FINDINGS  PROCEDURE:  Ankle, brachial and digital arterial pressures, CW Doppler   waveforms and digital PPG waveforms were performed. 1. Segmental pressures in both legs are not measurable due to  extensive arterial calcification. The ankle/brachial indexes are  therefore unreliable predictors of distal arterial perfusion. 2. Continuous wave Doppler signals were multiphasic to the ankle  levels bilaterally. 3. Unable to tolerate toe pressures. ADDITIONAL COMMENTS    I have personally reviewed the data relevant to the interpretation of  this  study.     TECHNOLOGIST: Beth Anglin RVT  Signed: 2017 03:22 PM    PHYSICIAN: Sara Dozier MD  Signed: 2018 02:14 PM

## 2017-12-22 NOTE — ROUTINE PROCESS

## 2017-12-22 NOTE — WOUND CARE
Wound Care Consult: Chart reviewed and patient assessed for his left foot wound. This is a chronic wound for at least one year per the patient. Podiatry is consulted to see him today and Dr. David Mcclelland or Dr. Leny Cosme is supposed to be here later. He usually see Dr. Leny Cosme but unknown on the last time he saw him. Per the history, the patient does his own \"wound care\" at home. An MRI is ordered for this patient as well as an EUGENE (ankle brachial index). Patient has chronic pain syndrome for which he is managed now with a Methadone clinic. He is an active smoker every day 1PPD. No history of diabetes but he has hypertention and hyperlipidemia. He had a significant stroke with an aneurysm and now has left side paralysis. Pt. Is alleric to Bactrim / Sulfa and Ciprofloxacin. Wound Care assessment: The left and right legs and feet are both very edematous with pitting. The left doral distal foot has an open draining lesion with purulent drainage and mild odor. The wound bed is brown but obscured due to the amount of drainage on the foot. Patient is being loaded up on a stretcher to go to the vascular lab right now for his EUGENE. Was able to do a brief exam to order wound care to be done. His whole foot is painful to palpate. The posterior foot has more macerated areas that are tendor to the touch and edematous / inflamed. WOUND POA CONDITIONS    Wound Foot Dorsal;Left    Number of days:468       Wound Foot Left (Active) - Current wound   DRESSING STATUS Old drainage 12/22/2017  2:55 PM   DRESSING TYPE Open to air 12/22/2017  2:55 PM   Non-Pressure Injury Full thickness (subcut/muscle) 12/22/2017  2:55 PM   Wound Length (cm) 3.5 cm 12/22/2017  2:55 PM   Wound Width (cm) 3.5 cm 12/22/2017  2:55 PM   Condition of Base Eschar;Slough; Other (comment) 12/22/2017  2:55 PM   Condition of Edges Rolled/curled 12/22/2017  2:55 PM   Epithelialization (%) 0 12/22/2017  2:55 PM   Tissue Type Other (comment) 12/22/2017  2:55 PM Drainage Amount  Moderate 12/22/2017  2:55 PM   Drainage Color Purulent 12/22/2017  2:55 PM   Wound Odor Mild 12/22/2017  2:55 PM   Periwound Skin Condition Edematous; Excoriated; Indurated; Macerated 12/22/2017  2:55 PM   Dressing Type Applied Other (Comment) 12/22/2017  7:45 AM   Procedure Tolerated Well 12/22/2017  2:55 PM        Plan: Order written for the initial wound care as this wound has been open to air and draining on a chux since last evening. Orders - Cleanse the wound with NS and wipe with gauze to remove the purulent drainage from the foot and from between the toes to be able to view the wound, which is very painful for the patient. Paint the left foot wound with Betadine swabs and allow it to dry completely. Apply a lite gauze dressing with radha wrap to allow drainage to be absorbed.    Edd Richmond, RN, BSN, CWON ([id number])

## 2017-12-22 NOTE — PROGRESS NOTES
Update: clarification of Methadone dose - calls placed last night and at 0730 this morning. Left call back messages. Will call again at [facility]/Methadone clinic [phone],  Esther Weir has verified Mr. Seven Cruz has been a patient since April 13th 2017. His last dose of Methadone was 12-21-17 at 0837, and he has been receiving 65 mg daily each morning. Dr. Erin Patel. Methadone 65 mg po daily reordered per Dr. Yarelis Bridges. Medication PTA list updated.

## 2017-12-23 LAB
ANION GAP SERPL CALC-SCNC: 5 MMOL/L (ref 5–15)
BASOPHILS # BLD: 0 K/UL (ref 0–0.1)
BASOPHILS NFR BLD: 0 % (ref 0–1)
BNP SERPL-MCNC: 592 PG/ML (ref 0–125)
BUN SERPL-MCNC: 5 MG/DL (ref 6–20)
BUN/CREAT SERPL: 8 (ref 12–20)
CALCIUM SERPL-MCNC: 8.1 MG/DL (ref 8.5–10.1)
CHLORIDE SERPL-SCNC: 104 MMOL/L (ref 97–108)
CO2 SERPL-SCNC: 30 MMOL/L (ref 21–32)
CREAT SERPL-MCNC: 0.66 MG/DL (ref 0.7–1.3)
DATE LAST DOSE: ABNORMAL
EOSINOPHIL # BLD: 0.5 K/UL (ref 0–0.4)
EOSINOPHIL NFR BLD: 5 % (ref 0–7)
ERYTHROCYTE [DISTWIDTH] IN BLOOD BY AUTOMATED COUNT: 17 % (ref 11.5–14.5)
GLUCOSE SERPL-MCNC: 104 MG/DL (ref 65–100)
HCT VFR BLD AUTO: 35 % (ref 36.6–50.3)
HGB BLD-MCNC: 11.1 G/DL (ref 12.1–17)
LYMPHOCYTES # BLD: 2.6 K/UL (ref 0.8–3.5)
LYMPHOCYTES NFR BLD: 24 % (ref 12–49)
MCH RBC QN AUTO: 27.6 PG (ref 26–34)
MCHC RBC AUTO-ENTMCNC: 31.7 G/DL (ref 30–36.5)
MCV RBC AUTO: 87.1 FL (ref 80–99)
MONOCYTES # BLD: 1.1 K/UL (ref 0–1)
MONOCYTES NFR BLD: 10 % (ref 5–13)
NEUTS SEG # BLD: 6.5 K/UL (ref 1.8–8)
NEUTS SEG NFR BLD: 61 % (ref 32–75)
PLATELET # BLD AUTO: 226 K/UL (ref 150–400)
POTASSIUM SERPL-SCNC: 3.9 MMOL/L (ref 3.5–5.1)
RBC # BLD AUTO: 4.02 M/UL (ref 4.1–5.7)
REPORTED DOSE,DOSE: ABNORMAL UNITS
REPORTED DOSE/TIME,TMG: 400
SODIUM SERPL-SCNC: 139 MMOL/L (ref 136–145)
VANCOMYCIN TROUGH SERPL-MCNC: 19 UG/ML (ref 5–10)
WBC # BLD AUTO: 10.8 K/UL (ref 4.1–11.1)

## 2017-12-23 PROCEDURE — 74011250637 HC RX REV CODE- 250/637: Performed by: INTERNAL MEDICINE

## 2017-12-23 PROCEDURE — 74011250636 HC RX REV CODE- 250/636: Performed by: INTERNAL MEDICINE

## 2017-12-23 PROCEDURE — 80048 BASIC METABOLIC PNL TOTAL CA: CPT | Performed by: INTERNAL MEDICINE

## 2017-12-23 PROCEDURE — 85025 COMPLETE CBC W/AUTO DIFF WBC: CPT | Performed by: INTERNAL MEDICINE

## 2017-12-23 PROCEDURE — 80202 ASSAY OF VANCOMYCIN: CPT | Performed by: INTERNAL MEDICINE

## 2017-12-23 PROCEDURE — 74011000258 HC RX REV CODE- 258: Performed by: INTERNAL MEDICINE

## 2017-12-23 PROCEDURE — 83880 ASSAY OF NATRIURETIC PEPTIDE: CPT | Performed by: INTERNAL MEDICINE

## 2017-12-23 PROCEDURE — 74011250637 HC RX REV CODE- 250/637: Performed by: HOSPITALIST

## 2017-12-23 PROCEDURE — 36415 COLL VENOUS BLD VENIPUNCTURE: CPT | Performed by: INTERNAL MEDICINE

## 2017-12-23 PROCEDURE — 65660000000 HC RM CCU STEPDOWN

## 2017-12-23 RX ORDER — ALLOPURINOL 100 MG/1
100 TABLET ORAL DAILY
Status: DISCONTINUED | OUTPATIENT
Start: 2017-12-23 | End: 2018-01-13 | Stop reason: HOSPADM

## 2017-12-23 RX ADMIN — SODIUM CHLORIDE: 900 INJECTION, SOLUTION INTRAVENOUS at 12:02

## 2017-12-23 RX ADMIN — HYDROMORPHONE HYDROCHLORIDE 0.5 MG: 2 INJECTION, SOLUTION INTRAMUSCULAR; INTRAVENOUS; SUBCUTANEOUS at 15:00

## 2017-12-23 RX ADMIN — SODIUM CHLORIDE: 900 INJECTION, SOLUTION INTRAVENOUS at 05:16

## 2017-12-23 RX ADMIN — VANCOMYCIN HYDROCHLORIDE 1250 MG: 10 INJECTION, POWDER, LYOPHILIZED, FOR SOLUTION INTRAVENOUS at 05:17

## 2017-12-23 RX ADMIN — SODIUM CHLORIDE: 900 INJECTION, SOLUTION INTRAVENOUS at 21:46

## 2017-12-23 RX ADMIN — HYDROMORPHONE HYDROCHLORIDE 0.5 MG: 2 INJECTION, SOLUTION INTRAMUSCULAR; INTRAVENOUS; SUBCUTANEOUS at 11:50

## 2017-12-23 RX ADMIN — VANCOMYCIN HYDROCHLORIDE 1250 MG: 10 INJECTION, POWDER, LYOPHILIZED, FOR SOLUTION INTRAVENOUS at 21:46

## 2017-12-23 RX ADMIN — VANCOMYCIN HYDROCHLORIDE 1250 MG: 10 INJECTION, POWDER, LYOPHILIZED, FOR SOLUTION INTRAVENOUS at 12:02

## 2017-12-23 RX ADMIN — Medication 10 ML: at 05:17

## 2017-12-23 RX ADMIN — FAMOTIDINE 20 MG: 20 TABLET ORAL at 08:34

## 2017-12-23 RX ADMIN — FAMOTIDINE 20 MG: 20 TABLET ORAL at 17:58

## 2017-12-23 RX ADMIN — METHADONE HYDROCHLORIDE 65 MG: 10 TABLET ORAL at 08:33

## 2017-12-23 RX ADMIN — HYDROMORPHONE HYDROCHLORIDE 0.5 MG: 2 INJECTION, SOLUTION INTRAMUSCULAR; INTRAVENOUS; SUBCUTANEOUS at 17:58

## 2017-12-23 RX ADMIN — Medication 10 ML: at 15:01

## 2017-12-23 RX ADMIN — HYDROMORPHONE HYDROCHLORIDE 0.5 MG: 2 INJECTION, SOLUTION INTRAMUSCULAR; INTRAVENOUS; SUBCUTANEOUS at 21:38

## 2017-12-23 RX ADMIN — LISINOPRIL 20 MG: 20 TABLET ORAL at 08:34

## 2017-12-23 RX ADMIN — ASPIRIN 81 MG 81 MG: 81 TABLET ORAL at 08:34

## 2017-12-23 RX ADMIN — Medication 10 ML: at 21:38

## 2017-12-23 RX ADMIN — HYDROMORPHONE HYDROCHLORIDE 0.5 MG: 2 INJECTION, SOLUTION INTRAMUSCULAR; INTRAVENOUS; SUBCUTANEOUS at 08:33

## 2017-12-23 RX ADMIN — ENOXAPARIN SODIUM 40 MG: 40 INJECTION SUBCUTANEOUS at 17:58

## 2017-12-23 RX ADMIN — HYDROMORPHONE HYDROCHLORIDE 0.5 MG: 2 INJECTION, SOLUTION INTRAMUSCULAR; INTRAVENOUS; SUBCUTANEOUS at 05:17

## 2017-12-23 RX ADMIN — LEVOTHYROXINE SODIUM 75 MCG: 75 TABLET ORAL at 08:33

## 2017-12-23 RX ADMIN — HYDROMORPHONE HYDROCHLORIDE 0.5 MG: 2 INJECTION, SOLUTION INTRAMUSCULAR; INTRAVENOUS; SUBCUTANEOUS at 02:18

## 2017-12-23 NOTE — CONSULTS
Infectious Disease Consult    Date of Consultation:  December 23, 2017    Referring Physician: Dr Shea Martell:     Patient is a 47 y.o. male who is being seen for  Chronic wound left foot . Pt gives h/o worsening pain in  Left lower leg x 2-3 days . Also swelling ,redness both lower extremities . On questioning pt he admits to scratching his skin . The heater had made his skin dry & itchy. Pt has had wound on left foot for past 1 1/2 years ever since a heater fell on foot . He gives h/o Paralysis of left side secondary to aneurysm & stroke. He has been attending Wound care at [facility] in the past . More recently had Home health provide wound care at home. Pt states that did not help his wound . Pt has had an admission about a month ago at [facility]:   1. Cellulitis b/l lower extremities  2. Chronic wound left foot  3. Wound cultures obtained ( after being on antibiotics x 2 days , may not be accurate)  4. No peripheral arterial disease per vascular - palpable dorsalis pedis pulse, normal doppler U/sound , EUGNEE not able to be done due to non compressible vessels  5. MRI attempted , not completed due to pt not tolerating procedure       PLAN:   1. Continue Vancomycin & Pip- tazobactam   2. Elevate both lower extremities   3. Await records from [facility]  4. Wound care for Wound care recommendations , plan for regular wound care as out patient at [address]  5. Will need MRI to be done  6. Await WC.        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    Drug overdose T50.901A    HTN (hypertension) I10    Bilateral cellulitis of lower leg L03.116, U15.219     Past Medical History:   Diagnosis Date    Aneurysm ([facility]     (with stroke)    Gout     Hypercholesterolemia     Hypertension     Lesion of bladder     Seizures ([facility]     Stroke ([facility] 2006    left sided weakness    Th[facility]embolus ([facility]     Thyroid disease     TIA (transient ischemic attack) 2012      Family History   Problem Relation Age of Onset    Diabetes Father     Diabetes Sister     Diabetes Brother       Social History   Substance Use Topics    Smoking status: Current Every Day Smoker     Packs/day: 1.00    Smokeless tobacco: Never Used    Alcohol use 8.4 oz/week     14 Cans of beer per week     Past Surgical History:   Procedure Laterality Date    HX ORTHOPAEDIC      KNEE ARTHROSCP HARV      left knee      Prior to Admission medications    Medication Sig Start Date End Date Taking? Authorizing Provider   METHADONE HCL (METHADONE, BULK,) Take 65 mg by mouth daily. Per [facility] [phone] Dr. Natty Vega  Verified [phone]   Yes Maximus Penny MD   levothyroxine (SYNTHROID) 75 mcg tablet Take 75 mcg by mouth Daily (before breakfast). Yes Saravanan Horn MD   aspirin 81 mg chewable tablet Take 1 Tab by mouth daily. 12/13/12  Yes Tasia Donis MD   lisinopril (PRINIVIL, ZESTRIL) 20 mg tablet Take 1 Tab by mouth daily. 6/22/12  Yes Good Joy NP   allopurinol (ZYLOPRIM) 100 mg tablet Take  by mouth daily. Historical Provider   colchicine (COLCRYS) 0.6 mg tablet Take 0.6 mg by mouth daily. Saravanan Horn MD     Allergies   Allergen Reactions    Sulfamethoxazole-Trimethoprim Rash     L eye and L hand    Ciprofloxacin Hives     Per pcp records    Codeine Hives     Tolerates dilaudid, oxycodone    Lyrica [Pregabalin] Hives    Ultram [Tramadol] Hives        Review of Systems:   Pain in both lower legs. Ten poit ROS obtained    Objective:   Blood pressure 156/86, pulse 70, temperature 98.1 °F (36.7 °C), resp.  rate 18, height 6' 1\" (1.854 m), weight 94.3 kg (208 lb), SpO2 97 %. Temp (24hrs), Av.9 °F (36.6 °C), Min:97.6 °F (36.4 °C), Max:98.3 °F (36.8 °C)    Current Facility-Administered Medications   Medication Dose Route Frequency    allopurinol (ZYLOPRIM) tablet 100 mg  100 mg Oral DAILY    [START ON 2017] lactobac ac& pc-s.therm-b.anim (YULIA Q/RISAQUAD)  1 Cap Oral DAILY    methadone (DOLOPHINE) tablet 65 mg  65 mg Oral DAILY    famotidine (PEPCID) tablet 20 mg  20 mg Oral BID    HYDROmorphone (PF) (DILAUDID) injection 0.5 mg  0.5 mg IntraVENous Q3H PRN    aspirin chewable tablet 81 mg  81 mg Oral DAILY    levothyroxine (SYNTHROID) tablet 75 mcg  75 mcg Oral ACB    lisinopril (PRINIVIL, ZESTRIL) tablet 20 mg  20 mg Oral DAILY    sodium chloride (NS) flush 5-10 mL  5-10 mL IntraVENous Q8H    sodium chloride (NS) flush 5-10 mL  5-10 mL IntraVENous PRN    naloxone (NARCAN) injection 0.4 mg  0.4 mg IntraVENous PRN    ondansetron (ZOFRAN) injection 4 mg  4 mg IntraVENous Q4H PRN    enoxaparin (LOVENOX) injection 40 mg  40 mg SubCUTAneous Q24H    vancomycin (VANCOCIN) 1250 mg in  ml infusion  1,250 mg IntraVENous Q8H    piperacillin-tazobactam 3.375 g in 0.9% sodium chloride 100 mL IVPB   IntraVENous Q8H        Exam:    General:  Alert, cooperative, appears stated age   Eyes:  Sclera anicteric. Pupils equally round and reactive to light. Mouth/Throat: Mucous membranes normal, oral pharynx clear   Neck: Supple   Lungs:   Clear to auscultation bilaterally, good effort   CV:  Regular rate and rhythm,no murmur, click, rub or gallop   Abdomen:   Soft, non-tender.  bowel sounds normal. non-distended   Extremities:  edema, erythema , warm to touch both lower legs L>R , left foot wound dressing+   Skin: Skin color, texture, turgor normal. no acute rash or lesions   Lymph nodes: Cervical and supraclavicular normal       Lines/Devices:  Intact, no erythema, drainage or tenderness   Psych: Alert and oriented, normal mood affect given the setting       Data Review:   CBC:   Recent Labs      12/23/17   0440  12/22/17   0301  12/21/17   1128   WBC  10.8  12.1*  12.7*   RBC  4.02*  3.98*  4.26   HGB  11.1*  11.5*  11.8*   HCT  35.0*  35.2*  36.6   PLT  226  264  319   GRANS  61   --   75   LYMPH  24   --   17   EOS  5   --   2     CMP:   Recent Labs      12/23/17   0440  12/22/17   0301  12/21/17   1128   GLU  104*  107*  101*   NA  139  141  138   K  3.9  3.9  3.8   CL  104  107  105   CO2  30  31  25   BUN  5*  6  6   CREA  0.66*  0.76  0.69*   CA  8.1*  8.2*  8.6   AGAP  5  3*  8   BUCR  8*  8*  9*   AP   --    --   240*   TP   --    --   7.0   ALB   --    --   3.5   GLOB   --    --   3.5   AGRAT   --    --   1.0*       Lab Results   Component Value Date/Time    Culture result: NO GROWTH 2 DAYS 12/21/2017 11:28 AM    Culture result: Culture performed on Unspun Fluid 10/16/2016 05:50 PM    Culture result: NO GROWTH 14 DAYS 10/16/2016 05:50 PM    Culture result: NO GROWTH 7 DAYS 02/04/2010 10:55 PM    Culture result: NO GROWTH 5 DAYS 02/12/2009 11:30 PM          XR Results (most recent):    Results from [place] encounter on 12/21/17   XR FOOT LT MIN 3 V   Narrative EXAM:  XR FOOT LT MIN 3 V    INDICATION:   Left foot wound. COMPARISON:  None. FINDINGS:  Three views of the left foot demonstrate no definite fracture or bony  destructive lesion. There is osteopenia. Study is mildly limited by  positioning. . . Impression IMPRESSION:  No acute abnormality identified. .                    ICD-10-CM ICD-9-CM    1. Cellulitis of lower extremity, unspecified laterality L03.119 682.6        I have discussed the diagnosis with the patient and the intended plan as seen in the above orders. The patient has received an after-visit summary and questions were answered concerning future plans. I have discussed medication side effects and warnings with the patient as well.     Reviewed test results at length with patient    Signed By: Norbert Silveira MD  FACP    December 23, 2017

## 2017-12-23 NOTE — ROUTINE PROCESS
Bedside and Verbal shift change report given to [facility] (oncoming nurse) by Fiorella Pike (offgoing nurse). Report included the following information SBAR, Kardex, Intake/Output, MAR and Recent Results.

## 2017-12-23 NOTE — CONSULTS
Brief Vascular Surgery  Consult Note    Impression: Chronic left foot wound with associated cellulitis. Left knee contracture and swelling lower leg related to previous stroke. Asked to see regarding adequacy of circulation for healing. He has a palpable left DP pulse as well as normal signals by Doppler study. Both are consistent with normal/adequate circulation for healing. No further vascular workup or treatment needed. Will see PRN.     Full note dictated    Hilda Bridges MD

## 2017-12-23 NOTE — PROGRESS NOTES
Pharmacy Automatic Renal Dosing Protocol - Antimicrobials    Indication for Antimicrobials: Skin and Soft Tissue Infection (foot)    Current Regimen of Each Antimicrobial:  Piperacillin Tazobactam 3.375 grams Q8H  (Start Date ; Day # 3)  Vancomycin 1250 mg Q8H Start Date ; Day # 3)    Previous Antimicrobial Therapy:  Vancomycin Goal Level: 15-20    Measured / Extrapolated Vancomycin Level:    19.0 / 16.6  Significant Cultures:   17 - Blood - ng x2 days pending  : MRI: pending    Labs:  Recent Labs      17   0440  17   0301  17   1128   CREA  0.66*  0.76  0.69*   BUN  5*  6  6   WBC  10.8  12.1*  12.7*     Temp (24hrs), Av.9 °F (36.6 °C), Min:97.6 °F (36.4 °C), Max:98.3 °F (36.8 °C)    Paralysis, amputations, malnutrition: No  Creatinine Clearance (mL/min) or Dialysis: > 100    Impression/Plan:   - Vancomycin Trough 16.6 is within the desired range (15-20) to treat deep wound infection  - Continue Vancomycin 1.25gm IV q8h  - Continue Zosyn 3.375gm IV q8h  - Daily BMP  - Plan for MRI to r/o Osteomyelitis     Pharmacy will follow daily and adjust medications as appropriate for renal function and/or serum levels. Thank you,  Jaylan Navarro [facility]    Recommended duration of therapy  http://[facility]/[place]/[facility]/Pharmacy/Clinical%20Companion/Duration%20of%20ABX%20therapy. docx    Renal Dosing  http://[facility]/[place]/[facility]/Pharmacy/Clinical%20Companion/Renal%20Dosing%[id number]. pdf

## 2017-12-23 NOTE — PROGRESS NOTES
Bedside shift change report given to Torie RN (oncoming nurse) by Renuka RN (offgoing nurse). Report included the following information SBAR, Kardex, Procedure Summary, Intake/Output, MAR and Recent Results.

## 2017-12-23 NOTE — CONSULTS
[address]  MR#: [id number]  : 1965  ACCOUNT #: [de-identified]   DATE OF SERVICE: 2017    REASON FOR CONSULTATION:  Left foot wound and possible peripheral vascular disease. HISTORY OF PRESENT ILLNESS:  The patient is a 67-year-old male with a chronic left foot wound that has been present for over a year. He has had a previous stroke and has limited use of his left leg. He was admitted 2 days ago from the Emergency Department for cellulitis of the left foot. He is on IV antibiotics. He was seen by Podiatry yesterday who was concerned that he may have significant peripheral vascular disease as a component of his foot wound problems. He had an arterial Doppler study done yesterday. Ankle pressures were not measurable due to noncompressible vessels, but Doppler signals at the ankle level were normal bilaterally. He complains of pain in the left lower leg and foot. PAST MEDICAL HISTORY:  Previous stroke with left hemiparesis. ADMISSION MEDICATIONS:  Methadone, levothyroxine, aspirin, lisinopril, allopurinol, colchicine. SOCIAL HISTORY:  The patient lives at home with family. FAMILY HISTORY:  Unremarkable. REVIEW OF SYSTEMS:  He has had no recent cardiac, respiratory, GI, , or neurologic complaints. PHYSICAL EXAMINATION:  GENERAL:  He is a middle-aged male in no distress. LUNGS:  Clear. HEART:  Regular rate and rhythm. ABDOMEN:  Soft and nontender. EXTREMITIES:  He has a fixed contracture of the left knee with minimal use of his left leg. He has chronic-appearing swelling of the left lower leg with erythema of the foot and the lower leg. He has a wound on the anterior aspect of the left foot at the base of the 2nd and 3rd toes. Because of tenderness the wound could not be probed to evaluate the depth of the wound. He has a palpable dorsalis pedis pulse on the left.     IMPRESSION:  Patient has a chronic left foot wound that is probably not healing due to chronic swelling and noncompliance. He is getting appropriate treatment at present with wound care, but elevation and IV antibiotics. He does not have arterial insufficiency based on the presence of a palpable dorsalis pedis pulse on the left as well as normal Doppler signals. He does not need further vascular workup or treatment. These findings were discussed with the patient's primary hospitalist.  We will see him again as needed.       MD CRISTOPHER Sosa / [place]  D: 12/23/2017 09:03     T: 12/23/2017 09:18  JOB #: [id number]

## 2017-12-23 NOTE — PROGRESS NOTES
Hospitalist Progress Note    NAME: Shante Zeng   :  1965   MRN:  [id number]       Assessment / Plan:  Left foot cellulitis POA with purulent drainage  Chronic non healing L Foot ulcer/wound POA  -leukocytosis is down  Worsening L LE swelling/pain prior to admission   Chronic left foot wound with associated cellulitis. Left knee contracture and swelling lower leg related to previous stroke. -AB: zosyn + Vanc day 3  BC NTD, wound cultures just done today by RN ( 2 days on AB already)   -MRI - unable to complete  due to pain; will re try in 24-48 hr if pain improve  Also will try to obtain record from [place] [address]    -seen by ortho: wound care ordered; felt that this is more a vascular issue. Reccommended to consider revascularization priot to any type of debridement of tissue or bone  -seen by vascular: pt has a palpable left DP pulse as well as normal signals by Doppler study. Both are consistent with normal/adequate circulation for healing. No further vascular workup or treatment needed. Will see PRN. -amputation was mentioned by vascular to the pt. Pt is adamant against it now. -LE dopplers negative for DVT     Essential HTN POA  -BP stable  -continue home lisinopril     Left Hemiparesis from Old CVA POA  Chronic Pain syndrome on maintenance Methadone at [facility] 65 mg daily   Hyperlipidemia, not on meds at home   Gout arthritis, cont allopurinol               Code Status: Full  Surrogate Decision Maker: sister Maurilio Jose # [phone]  DVT Prophylaxis: SQ Lovenox    Baseline: lives alone, ambulating with cane  Body mass index is 27.44 kg/(m^2). Recommended Disposition: TBD     Subjective:     Chief Complaint / Reason for Physician Visit: left foot cellulitis / HTN   C/o pain is not better,asking to increase pain meds     Discussed with RN events overnight.      Review of Systems:  Symptom Y/N Comments  Symptom Y/N Comments   Fever/Chills n   Chest Pain n    Poor Appetite Edema     Cough    Abdominal Pain n    Sputum    Joint Pain     SOB/BECK n   Pruritis/Rash     Nausea/vomit n   Tolerating PT/OT     Diarrhea n   Tolerating Diet     Constipation n   Other       Could NOT obtain due to:      Objective:     VITALS:   Last 24hrs VS reviewed since prior progress note. Most recent are:  Patient Vitals for the past 24 hrs:   Temp Pulse Resp BP SpO2   12/23/17 1045 97.9 °F (36.6 °C) 61 18 (!) 145/94 96 %   12/23/17 0650 97.6 °F (36.4 °C) 60 16 134/82 93 %   12/23/17 0324 97.8 °F (36.6 °C) 62 18 139/82 95 %   12/22/17 2318 98.3 °F (36.8 °C) 67 20 152/86 96 %   12/22/17 1914 97.6 °F (36.4 °C) (!) 56 18 145/86 99 %   12/22/17 1404 97.9 °F (36.6 °C) 63 18 152/90 98 %       Intake/Output Summary (Last 24 hours) at 12/23/17 1148  Last data filed at 12/23/17 0427   Gross per 24 hour   Intake                0 ml   Output             1900 ml   Net            -1900 ml        PHYSICAL EXAM:  General: WD, WN. Alert, cooperative, no acute distress    EENT:  EOMI. Anicteric sclerae. MMM  Resp:  CTA bilaterally, no wheezing or rales. No accessory muscle use  CV:  Regular  rhythm,  ++ edema  GI:  Soft, Non distended, Non tender.  +Bowel sounds  Neurologic:  Alert and oriented X 3, normal speech,   Psych:   Good insight. Not anxious nor agitated  Skin:  No rashes.   No jaundice                          Wound not seen today ( dressing was just completed by RN)                                                12/22          Reviewed most current lab test results and cultures  YES  Reviewed most current radiology test results   YES  Review and summation of old records today    NO  Reviewed patient's current orders and MAR    YES  PMH/SH reviewed - no change compared to H&P  ________________________________________________________________________  Care Plan discussed with:    Comments   Patient y    Family      RN y    Care Manager     Consultant  y Vascular                      Multidiciplinary team rounds were held today with , nursing, pharmacist and clinical coordinator. Patient's plan of care was discussed; medications were reviewed and discharge planning was addressed. ________________________________________________________________________  Total NON critical care TIME:  35 Minutes    Total CRITICAL CARE TIME Spent:   Minutes non procedure based      Comments   >50% of visit spent in counseling and coordination of care     ________________________________________________________________________  Tasia Donis MD     Procedures: see electronic medical records for all procedures/Xrays and details which were not copied into this note but were reviewed prior to creation of Plan. LABS:  I reviewed today's most current labs and imaging studies.   Pertinent labs include:  Recent Labs      12/23/17   0440  12/22/17   0301  12/21/17   1128   WBC  10.8  12.1*  12.7*   HGB  11.1*  11.5*  11.8*   HCT  35.0*  35.2*  36.6   PLT  226  264  319     Recent Labs      12/23/17   0440  12/22/17   0301  12/21/17   1128   NA  139  141  138   K  3.9  3.9  3.8   CL  104  107  105   CO2  30  31  25   GLU  104*  107*  101*   BUN  5*  6  6   CREA  0.66*  0.76  0.69*   CA  8.1*  8.2*  8.6   ALB   --    --   3.5   TBILI   --    --   0.6   SGOT   --    --   20   ALT   --    --   27   INR   --    --   1.1       Signed: Tasia Donis MD

## 2017-12-24 ENCOUNTER — APPOINTMENT (OUTPATIENT)
Dept: MRI IMAGING | Age: 52
DRG: 383 | End: 2017-12-24
Attending: INTERNAL MEDICINE
Payer: MEDICAID

## 2017-12-24 LAB
ANION GAP SERPL CALC-SCNC: 7 MMOL/L (ref 5–15)
BUN SERPL-MCNC: 7 MG/DL (ref 6–20)
BUN/CREAT SERPL: 10 (ref 12–20)
CALCIUM SERPL-MCNC: 8.1 MG/DL (ref 8.5–10.1)
CHLORIDE SERPL-SCNC: 103 MMOL/L (ref 97–108)
CO2 SERPL-SCNC: 29 MMOL/L (ref 21–32)
CREAT SERPL-MCNC: 0.69 MG/DL (ref 0.7–1.3)
ERYTHROCYTE [DISTWIDTH] IN BLOOD BY AUTOMATED COUNT: 17.3 % (ref 11.5–14.5)
GLUCOSE SERPL-MCNC: 115 MG/DL (ref 65–100)
HCT VFR BLD AUTO: 35.8 % (ref 36.6–50.3)
HGB BLD-MCNC: 11.5 G/DL (ref 12.1–17)
MCH RBC QN AUTO: 28.3 PG (ref 26–34)
MCHC RBC AUTO-ENTMCNC: 32.1 G/DL (ref 30–36.5)
MCV RBC AUTO: 88 FL (ref 80–99)
PLATELET # BLD AUTO: 215 K/UL (ref 150–400)
POTASSIUM SERPL-SCNC: 4.1 MMOL/L (ref 3.5–5.1)
RBC # BLD AUTO: 4.07 M/UL (ref 4.1–5.7)
SODIUM SERPL-SCNC: 139 MMOL/L (ref 136–145)
WBC # BLD AUTO: 12.5 K/UL (ref 4.1–11.1)

## 2017-12-24 PROCEDURE — 74011250636 HC RX REV CODE- 250/636: Performed by: HOSPITALIST

## 2017-12-24 PROCEDURE — 80048 BASIC METABOLIC PNL TOTAL CA: CPT | Performed by: INTERNAL MEDICINE

## 2017-12-24 PROCEDURE — 85027 COMPLETE CBC AUTOMATED: CPT | Performed by: HOSPITALIST

## 2017-12-24 PROCEDURE — 74011000258 HC RX REV CODE- 258: Performed by: INTERNAL MEDICINE

## 2017-12-24 PROCEDURE — 74011250636 HC RX REV CODE- 250/636: Performed by: INTERNAL MEDICINE

## 2017-12-24 PROCEDURE — 74011250637 HC RX REV CODE- 250/637: Performed by: INTERNAL MEDICINE

## 2017-12-24 PROCEDURE — 36415 COLL VENOUS BLD VENIPUNCTURE: CPT | Performed by: INTERNAL MEDICINE

## 2017-12-24 PROCEDURE — 74011250637 HC RX REV CODE- 250/637: Performed by: HOSPITALIST

## 2017-12-24 PROCEDURE — 65660000000 HC RM CCU STEPDOWN

## 2017-12-24 RX ORDER — POLYETHYLENE GLYCOL 3350 17 G/17G
17 POWDER, FOR SOLUTION ORAL DAILY
Status: DISCONTINUED | OUTPATIENT
Start: 2017-12-25 | End: 2018-01-13 | Stop reason: HOSPADM

## 2017-12-24 RX ORDER — DOCUSATE SODIUM 100 MG/1
100 CAPSULE, LIQUID FILLED ORAL 2 TIMES DAILY
Status: DISCONTINUED | OUTPATIENT
Start: 2017-12-24 | End: 2018-01-13 | Stop reason: HOSPADM

## 2017-12-24 RX ORDER — HYDROMORPHONE HYDROCHLORIDE 2 MG/ML
1 INJECTION, SOLUTION INTRAMUSCULAR; INTRAVENOUS; SUBCUTANEOUS
Status: DISCONTINUED | OUTPATIENT
Start: 2017-12-24 | End: 2017-12-25

## 2017-12-24 RX ORDER — HYDROMORPHONE HYDROCHLORIDE 2 MG/1
2 TABLET ORAL
Status: DISCONTINUED | OUTPATIENT
Start: 2017-12-24 | End: 2017-12-25

## 2017-12-24 RX ORDER — COLCHICINE 0.6 MG/1
0.6 TABLET ORAL 2 TIMES DAILY
Status: DISCONTINUED | OUTPATIENT
Start: 2017-12-24 | End: 2018-01-13 | Stop reason: HOSPADM

## 2017-12-24 RX ORDER — COLCHICINE 0.6 MG/1
0.6 TABLET ORAL DAILY
Status: DISCONTINUED | OUTPATIENT
Start: 2017-12-24 | End: 2017-12-24

## 2017-12-24 RX ORDER — HYDROMORPHONE HYDROCHLORIDE 2 MG/ML
1 INJECTION, SOLUTION INTRAMUSCULAR; INTRAVENOUS; SUBCUTANEOUS
Status: DISCONTINUED | OUTPATIENT
Start: 2017-12-24 | End: 2017-12-24

## 2017-12-24 RX ADMIN — METHADONE HYDROCHLORIDE 65 MG: 10 TABLET ORAL at 09:22

## 2017-12-24 RX ADMIN — ASPIRIN 81 MG 81 MG: 81 TABLET ORAL at 09:22

## 2017-12-24 RX ADMIN — HYDROMORPHONE HYDROCHLORIDE 2 MG: 2 TABLET ORAL at 13:08

## 2017-12-24 RX ADMIN — COLCHICINE 0.6 MG: 0.6 TABLET, FILM COATED ORAL at 09:21

## 2017-12-24 RX ADMIN — VANCOMYCIN HYDROCHLORIDE 1250 MG: 10 INJECTION, POWDER, LYOPHILIZED, FOR SOLUTION INTRAVENOUS at 05:56

## 2017-12-24 RX ADMIN — HYDROMORPHONE HYDROCHLORIDE 0.5 MG: 2 INJECTION, SOLUTION INTRAMUSCULAR; INTRAVENOUS; SUBCUTANEOUS at 03:37

## 2017-12-24 RX ADMIN — Medication 10 ML: at 13:15

## 2017-12-24 RX ADMIN — FAMOTIDINE 20 MG: 20 TABLET ORAL at 09:22

## 2017-12-24 RX ADMIN — Medication 10 ML: at 06:37

## 2017-12-24 RX ADMIN — ENOXAPARIN SODIUM 40 MG: 40 INJECTION SUBCUTANEOUS at 17:15

## 2017-12-24 RX ADMIN — VANCOMYCIN HYDROCHLORIDE 1250 MG: 10 INJECTION, POWDER, LYOPHILIZED, FOR SOLUTION INTRAVENOUS at 21:18

## 2017-12-24 RX ADMIN — HYDROMORPHONE HYDROCHLORIDE 1 MG: 2 INJECTION, SOLUTION INTRAMUSCULAR; INTRAVENOUS; SUBCUTANEOUS at 09:25

## 2017-12-24 RX ADMIN — HYDROMORPHONE HYDROCHLORIDE 0.5 MG: 2 INJECTION, SOLUTION INTRAMUSCULAR; INTRAVENOUS; SUBCUTANEOUS at 06:36

## 2017-12-24 RX ADMIN — Medication 1 CAPSULE: at 09:21

## 2017-12-24 RX ADMIN — HYDROMORPHONE HYDROCHLORIDE 0.5 MG: 2 INJECTION, SOLUTION INTRAMUSCULAR; INTRAVENOUS; SUBCUTANEOUS at 00:05

## 2017-12-24 RX ADMIN — HYDROMORPHONE HYDROCHLORIDE 2 MG: 2 TABLET ORAL at 17:14

## 2017-12-24 RX ADMIN — FAMOTIDINE 20 MG: 20 TABLET ORAL at 17:14

## 2017-12-24 RX ADMIN — SODIUM CHLORIDE: 900 INJECTION, SOLUTION INTRAVENOUS at 03:41

## 2017-12-24 RX ADMIN — SODIUM CHLORIDE: 900 INJECTION, SOLUTION INTRAVENOUS at 20:16

## 2017-12-24 RX ADMIN — LEVOTHYROXINE SODIUM 75 MCG: 75 TABLET ORAL at 09:21

## 2017-12-24 RX ADMIN — SODIUM CHLORIDE: 900 INJECTION, SOLUTION INTRAVENOUS at 13:09

## 2017-12-24 RX ADMIN — Medication 10 ML: at 21:18

## 2017-12-24 RX ADMIN — DOCUSATE SODIUM 100 MG: 100 CAPSULE, LIQUID FILLED ORAL at 17:14

## 2017-12-24 RX ADMIN — LISINOPRIL 20 MG: 20 TABLET ORAL at 09:22

## 2017-12-24 RX ADMIN — VANCOMYCIN HYDROCHLORIDE 1250 MG: 10 INJECTION, POWDER, LYOPHILIZED, FOR SOLUTION INTRAVENOUS at 14:24

## 2017-12-24 RX ADMIN — HYDROMORPHONE HYDROCHLORIDE 1 MG: 2 INJECTION, SOLUTION INTRAMUSCULAR; INTRAVENOUS; SUBCUTANEOUS at 14:39

## 2017-12-24 RX ADMIN — HYDROMORPHONE HYDROCHLORIDE 2 MG: 2 TABLET ORAL at 21:14

## 2017-12-24 RX ADMIN — COLCHICINE 0.6 MG: 0.6 TABLET, FILM COATED ORAL at 17:14

## 2017-12-24 NOTE — PROGRESS NOTES
Infectious Disease Progress Note        IMPRESSION:   1. Chronic ulcer dorsum of left foot   2. B/l  Lower extremity cellulitis  3. WC + for Pseudomonas, diphtheroids, thio broth only- Gram + cocci  4. No peripheral vascular disease per Vascular  5. Needs MRI to determine if underlying bone involvement present 1       PLAN:   1. Continue Vancomycin , Pip- tazobactam IV   2. MRI foot ( discussed with pt importance  for MRI in order to determine  plan of care)  3. Daily wound care , elevate both LE  4. Request records from [address] [place] [address] Road:     Pt seen . Complains of pain    Review of Systems:  A comprehensive review of systems was negative except for that written in the History of Present Illness. Objective:     Blood pressure (!) 154/91, pulse 68, temperature 98.2 °F (36.8 °C), resp. rate 19, height 6' 1\" (1.854 m), weight 94.3 kg (208 lb), SpO2 95 %. Temp (24hrs), Av.1 °F (36.7 °C), Min:97.8 °F (36.6 °C), Max:98.4 °F (36.9 °C)      Lines:  Peripheral IV:            Physical Exam:   General:  Cooperative, well developed, appears stated age   Eyes:  Sclera anicteric. Pupils equally round and reactive to light. Mouth/Throat: Mucous membranes normal, oral pharynx clear   Neck: Supple   Lungs:   Clear to auscultation bilaterally, good effort   CV:  Regular rate and rhythm,no murmur, click, rub or gallop   Abdomen:   Soft, non-tender. bowel sounds normal. non-distended   Extremities: B/l lower extremity  Edema+,erythema +, warmth less. Wound examined left foot dorsum.  Ulcer noted on bases of 2nd , 3rd toes , slough + base of ulcer , smell of Pseudomonas++       Lymph nodes: Cervical and supraclavicular normal       Lines/Devices:  Intact, no erythema, drainage or tenderness   Psych: Alert and oriented, normal mood affect       Data Review:   CBC:   Recent Labs      17   0649  17   0440  17   0301   WBC  12.5*  10.8  12.1*   RBC  4.07*  4.02*  3.98*   HGB  11.5*  11.1* 11.5*   HCT  35.8*  35.0*  35.2*   PLT  215  226  264   GRANS   --   61   --    LYMPH   --   24   --    EOS   --   5   --      CMP:   Recent Labs      12/24/17   0649  12/23/17   0440  12/22/17   0301   GLU  115*  104*  107*   NA  139  139  141   K  4.1  3.9  3.9   CL  103  104  107   CO2  29  30  31   BUN  7  5*  6   CREA  0.69*  0.66*  0.76   CA  8.1*  8.1*  8.2*   AGAP  7  5  3*   BUCR  10*  8*  8*       Studies:      Lab Results   Component Value Date/Time    Culture result: LIGHT PROBABLE PSEUDOMONAS SPECIES 12/22/2017 01:30 PM    Culture result: MODERATE DIPHTHEROIDS 12/22/2017 01:30 PM    Culture result:  12/22/2017 01:30 PM     GRAM POSITIVE COCCI SEEN ON GRAM STAIN OF THE THIO BROTH    Culture result: NO GROWTH 3 DAYS 12/21/2017 11:28 AM    Culture result: Culture performed on Unspun Fluid 10/16/2016 05:50 PM    Culture result: NO GROWTH 14 DAYS 10/16/2016 05:50 PM          XR Results (most recent):    Results from [place] encounter on 12/21/17   XR FOOT LT MIN 3 V   Narrative EXAM:  XR FOOT LT MIN 3 V    INDICATION:   Left foot wound. COMPARISON:  None. FINDINGS:  Three views of the left foot demonstrate no definite fracture or bony  destructive lesion. There is osteopenia. Study is mildly limited by  positioning. . . Impression IMPRESSION:  No acute abnormality identified. .              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 Cellulitis of lower extremity, unspecified laterality L03.119 682.6        I have discussed the diagnosis with the patient and the intended plan as seen in the above orders.      I have discussed medication side effects and warnings with the patient as well.     Reviewed test results at length with patient    Anti-infectives:      Vancomycin IV  D3   Pip- tazobactam IV D3  .  cA Santos MD [address]

## 2017-12-24 NOTE — PROGRESS NOTES
Hospitalist Progress Note    NAME: Wesley Sotelo   :  1965   MRN:  [id number]       Assessment / Plan:  Left foot cellulitis POA with purulent drainage  Chronic non healing L Foot ulcer/wound POA  -leukocytosis is up again ; pain is not controlled   + R LE swelling   Presented with worsening L LE swelling/pain   Chronic left foot wound with associated cellulitis. Left knee contracture and swelling lower leg related to previous stroke. -AB: zosyn + Vanc day 4  BC NTD, wound cultures pending  ID help appreciated   -MRI - unable to complete  due to pain; will try again once pain is better controlled  Awaiting record from [facility]    -seen by ortho: wound care ordered; felt that this is more a vascular issue. Reccommended to consider revascularization priot to any type of debridement of tissue or bone  : massage left on Dr Gil Sylvester cell phone about no revascularization required per vascular and please, re evaluate pt for debridement. Addendum: d/w Dr Gil Sylvester - pt will be seen tomorrow. Unlikely will heal well s/p sharp debridement. Very non compliant pt. Trial of santyl debridement first and try to get infection under control.   -seen by vascular: pt has a palpable left DP pulse as well as normal signals by Doppler study. Both are consistent with normal/adequate circulation for healing. No further vascular workup or treatment needed. Will see PRN. -amputation was mentioned by vascular to the pt. Pt is adamant against it now.    -LE dopplers negative for DVT     Essential HTN POA  -BP stable  -continue home lisinopril     Left Hemiparesis from Old CVA POA  Chronic Pain syndrome on maintenance Methadone at [facility] 65 mg daily   Hyperlipidemia, not on meds at home   Gout arthritis, cont allopurinol               Code Status: Full  Surrogate Decision Maker: sister Odette Shah # [phone]  DVT Prophylaxis: SQ Lovenox    Baseline: lives alone, ambulating with cane  Body mass index is 27.44 kg/(m^2). Recommended Disposition: TBD     Subjective:     Chief Complaint / Reason for Physician Visit: left foot cellulitis / HTN   Pain: reports not control despite increasing to 1 mg today     Discussed with RN events overnight. Review of Systems:  Symptom Y/N Comments  Symptom Y/N Comments   Fever/Chills n   Chest Pain n    Poor Appetite    Edema     Cough    Abdominal Pain n    Sputum    Joint Pain     SOB/BECK n   Pruritis/Rash     Nausea/vomit n   Tolerating PT/OT     Diarrhea n   Tolerating Diet     Constipation n   Other       Could NOT obtain due to:      Objective:     VITALS:   Last 24hrs VS reviewed since prior progress note. Most recent are:  Patient Vitals for the past 24 hrs:   Temp Pulse Resp BP SpO2   12/24/17 1115 97.8 °F (36.6 °C) (!) 55 19 150/86 96 %   12/24/17 0331 98 °F (36.7 °C) 70 20 (!) 153/91 97 %   12/23/17 2310 98.4 °F (36.9 °C) 66 20 152/83 95 %   12/23/17 1737 98.1 °F (36.7 °C) (!) 59 18 153/79 97 %   12/23/17 1410 98.1 °F (36.7 °C) 70 18 156/86 97 %       Intake/Output Summary (Last 24 hours) at 12/24/17 1244  Last data filed at 12/24/17 0745   Gross per 24 hour   Intake              420 ml   Output             2200 ml   Net            -1780 ml        PHYSICAL EXAM:  General: WD, WN. Alert, cooperative, no acute distress    EENT:  EOMI. Anicteric sclerae. MMM  Resp:  CTA bilaterally, no wheezing or rales. No accessory muscle use  CV:  Regular  rhythm,  ++ edema  GI:  Soft, Non distended, Non tender.  +Bowel sounds  Neurologic:  Alert and oriented X 3, normal speech,   Psych:   Good insight. Not anxious nor agitated  Skin:  No rashes.   No jaundice                          Wound seen today - + drainage                                                12/22          Reviewed most current lab test results and cultures  YES  Reviewed most current radiology test results   YES  Review and summation of old records today    NO  Reviewed patient's current orders and [facility] YES  PMH/SH reviewed - no change compared to H&P  ________________________________________________________________________  Care Plan discussed with:    Comments   Patient y    Family      RN y    Care Manager     Consultant                        Multidiciplinary team rounds were held today with , nursing, pharmacist and clinical coordinator. Patient's plan of care was discussed; medications were reviewed and discharge planning was addressed. ________________________________________________________________________  Total NON critical care TIME:  35 Minutes    Total CRITICAL CARE TIME Spent:   Minutes non procedure based      Comments   >50% of visit spent in counseling and coordination of care     ________________________________________________________________________  Denis Avina MD     Procedures: see electronic medical records for all procedures/Xrays and details which were not copied into this note but were reviewed prior to creation of Plan. LABS:  I reviewed today's most current labs and imaging studies.   Pertinent labs include:  Recent Labs      12/24/17   0649  12/23/17   0440  12/22/17   0301   WBC  12.5*  10.8  12.1*   HGB  11.5*  11.1*  11.5*   HCT  35.8*  35.0*  35.2*   PLT  215  226  264     Recent Labs      12/24/17   0649  12/23/17   0440  12/22/17   0301   NA  139  139  141   K  4.1  3.9  3.9   CL  103  104  107   CO2  29  30  31   GLU  115*  104*  107*   BUN  7  5*  6   CREA  0.69*  0.66*  0.76   CA  8.1*  8.1*  8.2*       Signed: Denis Avina MD

## 2017-12-24 NOTE — PROGRESS NOTES
Problem: Falls - Risk of  Goal: *Absence of Falls  Document Jordon Fall Risk and appropriate interventions in the flowsheet.    Outcome: Progressing Towards Goal  Fall Risk Interventions:  Mobility Interventions: Patient to call before getting OOB, PT Consult for mobility concerns, PT Consult for assist device competence, Utilize walker, cane, or other assitive device    Mentation Interventions: Adequate sleep, hydration, pain control, Update white board, Toileting rounds    Medication Interventions: Patient to call before getting OOB    Elimination Interventions: Call light in reach, Urinal in reach

## 2017-12-25 LAB
ANION GAP SERPL CALC-SCNC: 7 MMOL/L (ref 5–15)
BUN SERPL-MCNC: 9 MG/DL (ref 6–20)
BUN/CREAT SERPL: 14 (ref 12–20)
CALCIUM SERPL-MCNC: 8.2 MG/DL (ref 8.5–10.1)
CHLORIDE SERPL-SCNC: 103 MMOL/L (ref 97–108)
CO2 SERPL-SCNC: 28 MMOL/L (ref 21–32)
CREAT SERPL-MCNC: 0.64 MG/DL (ref 0.7–1.3)
GLUCOSE SERPL-MCNC: 125 MG/DL (ref 65–100)
POTASSIUM SERPL-SCNC: 4 MMOL/L (ref 3.5–5.1)
SODIUM SERPL-SCNC: 138 MMOL/L (ref 136–145)

## 2017-12-25 PROCEDURE — 74011250637 HC RX REV CODE- 250/637: Performed by: HOSPITALIST

## 2017-12-25 PROCEDURE — 80048 BASIC METABOLIC PNL TOTAL CA: CPT | Performed by: INTERNAL MEDICINE

## 2017-12-25 PROCEDURE — 36415 COLL VENOUS BLD VENIPUNCTURE: CPT | Performed by: INTERNAL MEDICINE

## 2017-12-25 PROCEDURE — 74011250636 HC RX REV CODE- 250/636: Performed by: INTERNAL MEDICINE

## 2017-12-25 PROCEDURE — 74011000258 HC RX REV CODE- 258: Performed by: INTERNAL MEDICINE

## 2017-12-25 PROCEDURE — 74011250636 HC RX REV CODE- 250/636: Performed by: HOSPITALIST

## 2017-12-25 PROCEDURE — 65660000000 HC RM CCU STEPDOWN

## 2017-12-25 PROCEDURE — 74011250637 HC RX REV CODE- 250/637: Performed by: INTERNAL MEDICINE

## 2017-12-25 RX ORDER — HYDROMORPHONE HYDROCHLORIDE 2 MG/1
4 TABLET ORAL
Status: DISCONTINUED | OUTPATIENT
Start: 2017-12-25 | End: 2017-12-27

## 2017-12-25 RX ORDER — HYDROMORPHONE HYDROCHLORIDE 2 MG/ML
2 INJECTION, SOLUTION INTRAMUSCULAR; INTRAVENOUS; SUBCUTANEOUS
Status: DISCONTINUED | OUTPATIENT
Start: 2017-12-25 | End: 2017-12-27

## 2017-12-25 RX ORDER — HYDROMORPHONE HYDROCHLORIDE 2 MG/ML
2 INJECTION, SOLUTION INTRAMUSCULAR; INTRAVENOUS; SUBCUTANEOUS ONCE
Status: ACTIVE | OUTPATIENT
Start: 2017-12-25 | End: 2017-12-26

## 2017-12-25 RX ADMIN — DOCUSATE SODIUM 100 MG: 100 CAPSULE, LIQUID FILLED ORAL at 08:24

## 2017-12-25 RX ADMIN — FAMOTIDINE 20 MG: 20 TABLET ORAL at 17:11

## 2017-12-25 RX ADMIN — METHADONE HYDROCHLORIDE 65 MG: 10 TABLET ORAL at 08:22

## 2017-12-25 RX ADMIN — HYDROMORPHONE HYDROCHLORIDE 2 MG: 2 TABLET ORAL at 01:12

## 2017-12-25 RX ADMIN — HYDROMORPHONE HYDROCHLORIDE 4 MG: 2 TABLET ORAL at 20:10

## 2017-12-25 RX ADMIN — ASPIRIN 81 MG 81 MG: 81 TABLET ORAL at 08:24

## 2017-12-25 RX ADMIN — DOCUSATE SODIUM 100 MG: 100 CAPSULE, LIQUID FILLED ORAL at 17:11

## 2017-12-25 RX ADMIN — Medication 10 ML: at 20:20

## 2017-12-25 RX ADMIN — COLCHICINE 0.6 MG: 0.6 TABLET, FILM COATED ORAL at 17:13

## 2017-12-25 RX ADMIN — ENOXAPARIN SODIUM 40 MG: 40 INJECTION SUBCUTANEOUS at 15:56

## 2017-12-25 RX ADMIN — LEVOTHYROXINE SODIUM 75 MCG: 75 TABLET ORAL at 08:22

## 2017-12-25 RX ADMIN — HYDROMORPHONE HYDROCHLORIDE 4 MG: 2 TABLET ORAL at 12:00

## 2017-12-25 RX ADMIN — HYDROMORPHONE HYDROCHLORIDE 2 MG: 2 INJECTION, SOLUTION INTRAMUSCULAR; INTRAVENOUS; SUBCUTANEOUS at 23:01

## 2017-12-25 RX ADMIN — LISINOPRIL 20 MG: 20 TABLET ORAL at 08:23

## 2017-12-25 RX ADMIN — SODIUM CHLORIDE: 900 INJECTION, SOLUTION INTRAVENOUS at 05:21

## 2017-12-25 RX ADMIN — Medication 10 ML: at 05:27

## 2017-12-25 RX ADMIN — HYDROMORPHONE HYDROCHLORIDE 4 MG: 2 TABLET ORAL at 15:55

## 2017-12-25 RX ADMIN — VANCOMYCIN HYDROCHLORIDE 1250 MG: 10 INJECTION, POWDER, LYOPHILIZED, FOR SOLUTION INTRAVENOUS at 15:55

## 2017-12-25 RX ADMIN — Medication 1 CAPSULE: at 08:22

## 2017-12-25 RX ADMIN — HYDROMORPHONE HYDROCHLORIDE 2 MG: 2 TABLET ORAL at 08:23

## 2017-12-25 RX ADMIN — VANCOMYCIN HYDROCHLORIDE 1250 MG: 10 INJECTION, POWDER, LYOPHILIZED, FOR SOLUTION INTRAVENOUS at 20:16

## 2017-12-25 RX ADMIN — VANCOMYCIN HYDROCHLORIDE 1250 MG: 10 INJECTION, POWDER, LYOPHILIZED, FOR SOLUTION INTRAVENOUS at 06:39

## 2017-12-25 RX ADMIN — COLCHICINE 0.6 MG: 0.6 TABLET, FILM COATED ORAL at 08:24

## 2017-12-25 RX ADMIN — SODIUM CHLORIDE: 900 INJECTION, SOLUTION INTRAVENOUS at 20:16

## 2017-12-25 RX ADMIN — Medication 10 ML: at 11:12

## 2017-12-25 RX ADMIN — SODIUM CHLORIDE: 900 INJECTION, SOLUTION INTRAVENOUS at 11:11

## 2017-12-25 RX ADMIN — HYDROMORPHONE HYDROCHLORIDE 2 MG: 2 TABLET ORAL at 05:05

## 2017-12-25 RX ADMIN — FAMOTIDINE 20 MG: 20 TABLET ORAL at 08:23

## 2017-12-25 NOTE — ROUTINE PROCESS
Bedside and Verbal shift change report given to [address] [place] [address] (oncoming nurse) by Cassy Campbell (offgoing nurse). Report included the following information SBAR, Kardex, Intake/Output, MAR, Recent Results and Cardiac Rhythm Sinus Mardene Favor.

## 2017-12-25 NOTE — PROGRESS NOTES
Attempted to call [facility] for medical records retrieval. At this time medical records are closed due to holiday.  Will relay in report to please call medical records tomorrow am.

## 2017-12-25 NOTE — PROGRESS NOTES
1835: Dr. Caesar Rodriges notified of blood pressure of 161/100 and pt's pain of 10/10. Call back number [phone] given. 1855: no call back. Dr. Caesar Rodriges paged again with call back number [phone] given. Order given to recheck blood pressure manually.

## 2017-12-25 NOTE — PROGRESS NOTES
Hospitalist Progress Note    NAME: Chilo Abraham   :  1965   MRN:  [id number]       Assessment / Plan:  Left foot cellulitis POA with purulent drainage  Chronic non healing L Foot ulcer/wound POA  - R LE swelling appears less today ; reports that pain is not controlled   Discussed with pt again today that eventually he may need amputation   Presented with worsening L LE swelling/pain   Chronic left foot wound with associated cellulitis. Left knee contracture and swelling lower leg related to previous stroke. -AB: zosyn + Vanc day 5  BC NTD, wound cultures: pseudomonas/diphtheroids - follow final    ID help appreciated   -MRI - unable to complete  due to pain  D/w pt again today impotence of MRI, he agreed to try; will pre medicate with IV dilaudid   Awaiting record from [facility]  ( d/w RN again today)   -seen by podaitry:    felt that this is more a vascular issue. Reccommended to consider revascularization priot to any type of debridement of tissue or bone  : d/w Dr Gavin Garcia. Pt will be seen today. Unlikely will heal well s/p sharp debridement. Very non compliant pt. Trial of santyl debridement first and try to get infection under control.   -seen by vascular: pt has a palpable left DP pulse as well as normal signals by Doppler study. Both are consistent with normal/adequate circulation for healing. No further vascular workup or treatment needed. Will see PRN. -amputation was mentioned by vascular to the pt. Pt is adamant against it now.    -LE dopplers negative for DVT     Essential HTN POA  -BP stable  -continue home lisinopril     Left Hemiparesis from Old CVA POA  Chronic Pain syndrome on maintenance Methadone at [facility] 65 mg daily   Hyperlipidemia, not on meds at home   Gout arthritis, cont allopurinol               Code Status: Full  Surrogate Decision Maker: sister Caterina Co # [phone]  DVT Prophylaxis: SQ Lovenox    Baseline: lives alone, ambulating with cane  Body mass index is 27.44 kg/(m^2). Recommended Disposition: TBD     Subjective:     Chief Complaint / Reason for Physician Visit: left foot cellulitis / HTN   Pain: pt continue to report that pain is not controlled     Discussed with RN events overnight. Review of Systems:  Symptom Y/N Comments  Symptom Y/N Comments   Fever/Chills n   Chest Pain n    Poor Appetite    Edema     Cough    Abdominal Pain n    Sputum    Joint Pain     SOB/BECK n   Pruritis/Rash     Nausea/vomit n   Tolerating PT/OT     Diarrhea n   Tolerating Diet     Constipation n   Other       Could NOT obtain due to:      Objective:     VITALS:   Last 24hrs VS reviewed since prior progress note. Most recent are:  Patient Vitals for the past 24 hrs:   Temp Pulse Resp BP SpO2   12/25/17 0539 98.5 °F (36.9 °C) 64 18 138/85 97 %   12/25/17 0221 98 °F (36.7 °C) 62 18 142/78 96 %   12/24/17 2200 97.8 °F (36.6 °C) 63 18 146/88 96 %   12/24/17 1853 98.7 °F (37.1 °C) (!) 59 20 (!) 144/96 96 %   12/24/17 1402 98.2 °F (36.8 °C) 68 19 (!) 154/91 95 %   12/24/17 1115 97.8 °F (36.6 °C) (!) 55 19 150/86 96 %       Intake/Output Summary (Last 24 hours) at 12/25/17 1010  Last data filed at 12/25/17 0910   Gross per 24 hour   Intake             2910 ml   Output             1300 ml   Net             1610 ml        PHYSICAL EXAM:  General: WD, WN. Alert, cooperative, no acute distress    EENT:  EOMI. Anicteric sclerae. MMM  Resp:  CTA bilaterally, no wheezing or rales. No accessory muscle use  CV:  Regular  rhythm,  R LE swelling looking better today   GI:  Soft, Non distended, Non tender.  +Bowel sounds  Neurologic:  Alert and oriented X 3, normal speech,   Psych:   Good insight. Not anxious nor agitated  Skin:  No rashes.   No jaundice                          Wound seen 12/24 - + drainage                                                12/22          Reviewed most current lab test results and cultures  YES  Reviewed most current radiology test results YES  Review and summation of old records today    NO  Reviewed patient's current orders and MAR    YES  PMH/SH reviewed - no change compared to H&P  ________________________________________________________________________  Care Plan discussed with:    Comments   Patient y    Family      RN y    Care Manager     Consultant                        Multidiciplinary team rounds were held today with , nursing, pharmacist and clinical coordinator. Patient's plan of care was discussed; medications were reviewed and discharge planning was addressed. ________________________________________________________________________  Total NON critical care TIME:  35 Minutes    Total CRITICAL CARE TIME Spent:   Minutes non procedure based      Comments   >50% of visit spent in counseling and coordination of care     ________________________________________________________________________  Erika Roman MD     Procedures: see electronic medical records for all procedures/Xrays and details which were not copied into this note but were reviewed prior to creation of Plan. LABS:  I reviewed today's most current labs and imaging studies.   Pertinent labs include:  Recent Labs      12/24/17   0649  12/23/17   0440   WBC  12.5*  10.8   HGB  11.5*  11.1*   HCT  35.8*  35.0*   PLT  215  226     Recent Labs      12/25/17   0441  12/24/17   0649  12/23/17   0440   NA  138  139  139   K  4.0  4.1  3.9   CL  103  103  104   CO2  28  29  30   GLU  125*  115*  104*   BUN  9  7  5*   CREA  0.64*  0.69*  0.66*   CA  8.2*  8.1*  8.1*       Signed: Erika Roman MD

## 2017-12-26 ENCOUNTER — APPOINTMENT (OUTPATIENT)
Dept: MRI IMAGING | Age: 52
DRG: 383 | End: 2017-12-26
Attending: INTERNAL MEDICINE
Payer: MEDICAID

## 2017-12-26 LAB
ANION GAP SERPL CALC-SCNC: 6 MMOL/L (ref 5–15)
BACTERIA SPEC CULT: NORMAL
BUN SERPL-MCNC: 10 MG/DL (ref 6–20)
BUN/CREAT SERPL: 16 (ref 12–20)
CALCIUM SERPL-MCNC: 8.7 MG/DL (ref 8.5–10.1)
CHLORIDE SERPL-SCNC: 101 MMOL/L (ref 97–108)
CO2 SERPL-SCNC: 29 MMOL/L (ref 21–32)
CREAT SERPL-MCNC: 0.63 MG/DL (ref 0.7–1.3)
ERYTHROCYTE [DISTWIDTH] IN BLOOD BY AUTOMATED COUNT: 17.1 % (ref 11.5–14.5)
GLUCOSE SERPL-MCNC: 98 MG/DL (ref 65–100)
HCT VFR BLD AUTO: 35.9 % (ref 36.6–50.3)
HGB BLD-MCNC: 11.3 G/DL (ref 12.1–17)
MAGNESIUM SERPL-MCNC: 2 MG/DL (ref 1.6–2.4)
MCH RBC QN AUTO: 27.1 PG (ref 26–34)
MCHC RBC AUTO-ENTMCNC: 31.5 G/DL (ref 30–36.5)
MCV RBC AUTO: 86.1 FL (ref 80–99)
PHOSPHATE SERPL-MCNC: 3.6 MG/DL (ref 2.6–4.7)
PLATELET # BLD AUTO: 249 K/UL (ref 150–400)
POTASSIUM SERPL-SCNC: 4 MMOL/L (ref 3.5–5.1)
RBC # BLD AUTO: 4.17 M/UL (ref 4.1–5.7)
SERVICE CMNT-IMP: NORMAL
SODIUM SERPL-SCNC: 136 MMOL/L (ref 136–145)
WBC # BLD AUTO: 9.2 K/UL (ref 4.1–11.1)

## 2017-12-26 PROCEDURE — 65660000000 HC RM CCU STEPDOWN

## 2017-12-26 PROCEDURE — 74011250636 HC RX REV CODE- 250/636: Performed by: INTERNAL MEDICINE

## 2017-12-26 PROCEDURE — 83735 ASSAY OF MAGNESIUM: CPT | Performed by: HOSPITALIST

## 2017-12-26 PROCEDURE — 85027 COMPLETE CBC AUTOMATED: CPT | Performed by: HOSPITALIST

## 2017-12-26 PROCEDURE — 74011250637 HC RX REV CODE- 250/637: Performed by: INTERNAL MEDICINE

## 2017-12-26 PROCEDURE — 36415 COLL VENOUS BLD VENIPUNCTURE: CPT | Performed by: HOSPITALIST

## 2017-12-26 PROCEDURE — 74011000258 HC RX REV CODE- 258: Performed by: INTERNAL MEDICINE

## 2017-12-26 PROCEDURE — 84100 ASSAY OF PHOSPHORUS: CPT | Performed by: HOSPITALIST

## 2017-12-26 PROCEDURE — 74011250636 HC RX REV CODE- 250/636: Performed by: HOSPITALIST

## 2017-12-26 PROCEDURE — 76450000000

## 2017-12-26 PROCEDURE — 80048 BASIC METABOLIC PNL TOTAL CA: CPT | Performed by: HOSPITALIST

## 2017-12-26 PROCEDURE — 74011250637 HC RX REV CODE- 250/637: Performed by: HOSPITALIST

## 2017-12-26 RX ADMIN — ENOXAPARIN SODIUM 40 MG: 40 INJECTION SUBCUTANEOUS at 16:48

## 2017-12-26 RX ADMIN — Medication 10 ML: at 06:31

## 2017-12-26 RX ADMIN — HYDROMORPHONE HYDROCHLORIDE 4 MG: 2 TABLET ORAL at 04:10

## 2017-12-26 RX ADMIN — POLYETHYLENE GLYCOL 3350 17 G: 17 POWDER, FOR SOLUTION ORAL at 09:42

## 2017-12-26 RX ADMIN — DOCUSATE SODIUM 100 MG: 100 CAPSULE, LIQUID FILLED ORAL at 09:45

## 2017-12-26 RX ADMIN — LEVOTHYROXINE SODIUM 75 MCG: 75 TABLET ORAL at 09:45

## 2017-12-26 RX ADMIN — SODIUM CHLORIDE: 900 INJECTION, SOLUTION INTRAVENOUS at 04:09

## 2017-12-26 RX ADMIN — COLCHICINE 0.6 MG: 0.6 TABLET, FILM COATED ORAL at 16:54

## 2017-12-26 RX ADMIN — HYDROMORPHONE HYDROCHLORIDE 2 MG: 2 INJECTION, SOLUTION INTRAMUSCULAR; INTRAVENOUS; SUBCUTANEOUS at 22:58

## 2017-12-26 RX ADMIN — HYDROMORPHONE HYDROCHLORIDE 4 MG: 2 TABLET ORAL at 18:50

## 2017-12-26 RX ADMIN — DOCUSATE SODIUM 100 MG: 100 CAPSULE, LIQUID FILLED ORAL at 16:51

## 2017-12-26 RX ADMIN — SODIUM CHLORIDE: 900 INJECTION, SOLUTION INTRAVENOUS at 21:49

## 2017-12-26 RX ADMIN — Medication 1 CAPSULE: at 09:44

## 2017-12-26 RX ADMIN — HYDROMORPHONE HYDROCHLORIDE 4 MG: 2 TABLET ORAL at 00:08

## 2017-12-26 RX ADMIN — FAMOTIDINE 20 MG: 20 TABLET ORAL at 16:51

## 2017-12-26 RX ADMIN — COLCHICINE 0.6 MG: 0.6 TABLET, FILM COATED ORAL at 09:46

## 2017-12-26 RX ADMIN — FAMOTIDINE 20 MG: 20 TABLET ORAL at 09:45

## 2017-12-26 RX ADMIN — HYDROMORPHONE HYDROCHLORIDE 4 MG: 2 TABLET ORAL at 10:03

## 2017-12-26 RX ADMIN — HYDROMORPHONE HYDROCHLORIDE 2 MG: 2 INJECTION, SOLUTION INTRAMUSCULAR; INTRAVENOUS; SUBCUTANEOUS at 12:39

## 2017-12-26 RX ADMIN — METHADONE HYDROCHLORIDE 65 MG: 10 TABLET ORAL at 09:44

## 2017-12-26 RX ADMIN — HYDROMORPHONE HYDROCHLORIDE 4 MG: 2 TABLET ORAL at 14:38

## 2017-12-26 RX ADMIN — HYDROMORPHONE HYDROCHLORIDE 2 MG: 2 INJECTION, SOLUTION INTRAMUSCULAR; INTRAVENOUS; SUBCUTANEOUS at 06:28

## 2017-12-26 RX ADMIN — ASPIRIN 81 MG 81 MG: 81 TABLET ORAL at 09:44

## 2017-12-26 RX ADMIN — SODIUM CHLORIDE: 900 INJECTION, SOLUTION INTRAVENOUS at 14:37

## 2017-12-26 RX ADMIN — HYDROMORPHONE HYDROCHLORIDE 4 MG: 2 TABLET ORAL at 21:11

## 2017-12-26 RX ADMIN — HYDROMORPHONE HYDROCHLORIDE 2 MG: 2 INJECTION, SOLUTION INTRAMUSCULAR; INTRAVENOUS; SUBCUTANEOUS at 18:18

## 2017-12-26 RX ADMIN — Medication 10 ML: at 14:37

## 2017-12-26 RX ADMIN — ALLOPURINOL 100 MG: 100 TABLET ORAL at 09:45

## 2017-12-26 RX ADMIN — LISINOPRIL 20 MG: 20 TABLET ORAL at 09:44

## 2017-12-26 RX ADMIN — VANCOMYCIN HYDROCHLORIDE 1250 MG: 10 INJECTION, POWDER, LYOPHILIZED, FOR SOLUTION INTRAVENOUS at 04:09

## 2017-12-26 NOTE — PROGRESS NOTES
I feel this patient needs to transferred to general/vascualr service. He has a history of chronic multiple debridements on this wound that has failed to heal.  Upon review of his EUGENE's, he has multiple arterial calcifications with incompressible arterial vessels. I feel the best option at this point is to treat this patient with wound care and IV antibiotics. As proven in the past, I do not feel this patient will fair well with a debridement without prior revascularization.    I have spoken with Dr. Jackie Way and will contact Dr. Onur Gonzalez

## 2017-12-26 NOTE — PROGRESS NOTES
Infectious Disease Progress Note        IMPRESSION:   1. Chronic ulcer dorsum of left foot   2. B/l  Lower extremity cellulitis  3. WC + for Pseudomonas, diphtheroids, thio broth only- Group B strep  4. No peripheral vascular disease per Vascular  5. Needs MRI to determine if underlying bone involvement present        PLAN:   1. Continue Pip- tazobactam IV , DC Vancomycin  2. MRI foot ( discussed with pt importance  for MRI in order to determine  plan of care)  3. Daily wound care , elevate both LE  4. Request records from [address] [place] [address]:     Pt seen . No complaints    Review of Systems:  A comprehensive review of systems was negative except for that written in the History of Present Illness. Objective:     Blood pressure (!) 160/102, pulse 66, temperature 98.5 °F (36.9 °C), resp. rate 18, height 6' 1\" (1.854 m), weight 94.3 kg (208 lb), SpO2 96 %. Temp (24hrs), Av.2 °F (36.8 °C), Min:98.1 °F (36.7 °C), Max:98.5 °F (36.9 °C)      Lines:  Peripheral IV:            Physical Exam:   General:  Cooperative, awake   Eyes:  Sclera anicteric. Pupils equally round and reactive to light. Mouth/Throat: Mucous membranes normal, oral pharynx clear   Neck: Supple   Lungs:   Clear to auscultation bilaterally, good effort   CV:  Regular rate and rhythm,no murmur, click, rub or gallop   Abdomen:   Soft, non-tender. bowel sounds normal. non-distended   Extremities: B/l lower extremity  Edema+,erythema +, warmth less.  Wound dressing +       Lymph nodes: Cervical and supraclavicular normal       Lines/Devices:  Intact, no erythema, drainage or tenderness   Psych: Alert and oriented, normal mood affect       Data Review:   CBC:   Recent Labs      17   0649   WBC  9.2  12.5*   RBC  4.17  4.07*   HGB  11.3*  11.5*   HCT  35.9*  35.8*   PLT  249  215     CMP:   Recent Labs      17   0441  17   0649   GLU  98  125*  115*   NA  136  138  139   K  4.0  4.0 4.1   CL  101  103  103   CO2  29  28  29   BUN  10  9  7   CREA  0.63*  0.64*  0.69*   CA  8.7  8.2*  8.1*   AGAP  6  7  7   BUCR  16  14  10*       Studies:      Lab Results   Component Value Date/Time    Culture result:  12/22/2017 01:30 PM     LIGHT PSEUDOMONAS AERUGINOSA (1ST STRAIN/COLONY TYPE)    Culture result:  12/22/2017 01:30 PM     LIGHT PSEUDOMONAS AERUGINOSA (2ND STRAIN/COLONY TYPE)    Culture result:  12/22/2017 01:30 PM     STREPTOCOCCI, BETA HEMOLYTIC GROUP B ISOLATED FROM THIO BROTH ONLY Penicillin and ampicillin are drugs of choice for treatment of beta-hemolytic streptococcal infections. Susceptibility testing of penicillins and beta-lactams approved by the FDA for treatment of beta-hemolytic streptococcal infections need not be performed routinely, because nonsusceptible isolates are extremely rare. CLSI 2012    Culture result: MODERATE DIPHTHEROIDS 12/22/2017 01:30 PM    Culture result: CHECKING FOR POSSIBLE ENTEROCOCCUS SPECIES 12/22/2017 01:30 PM          XR Results (most recent):    Results from [place] encounter on 12/21/17   XR FOOT LT MIN 3 V   Narrative EXAM:  XR FOOT LT MIN 3 V    INDICATION:   Left foot wound. COMPARISON:  None. FINDINGS:  Three views of the left foot demonstrate no definite fracture or bony  destructive lesion. There is osteopenia. Study is mildly limited by  positioning. . . Impression IMPRESSION:  No acute abnormality identified. .              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  Cellulitis of lower extremity, unspecified laterality L03.119 682.6        I have discussed the diagnosis with the patient and the intended plan as seen in the above orders. I have discussed medication side effects and warnings with the patient as well.     Reviewed test results at length with patient    Anti-infectives:      Vancomycin IV  D5- DC   Pip- tazobactam IV D5  .  Annmarie Cohn MD FACP

## 2017-12-26 NOTE — ROUTINE PROCESS
Bedside and Verbal shift change report given to Rose Martin (oncoming nurse) by Urban Menezes (offgoing nurse). Report included the following information SBAR, Kardex, Intake/Output, MAR and Recent Results.

## 2017-12-26 NOTE — PROGRESS NOTES
Bedside and Verbal shift change report given to Martin (oncoming nurse) by Sven Gaviria (offgoing nurse). Report included the following information SBAR, Kardex, OR Summary, Procedure Summary, Intake/Output, MAR and Accordion.

## 2017-12-26 NOTE — PROGRESS NOTES
Initial Nutrition Assessment:    INTERVENTIONS/RECOMMENDATIONS:   · Continue current diet    ASSESSMENT:   Doreen reviewed, medically noted for Chronic non healing L Foot ulcer/wound with cellulitis and PMH shown below. Assessing pt due to LOS. Per chart review, pt has a good appetite and no significant weight loss PTA. Will monitor PO intake. Past Medical History:   Diagnosis Date    Aneurysm ([facility]     (with stroke)    Gout     Hypercholesterolemia     Hypertension     Lesion of bladder     Seizures ([facility]     Stroke [facility]) 2006    left sided weakness    Thromboembolus ([facility]     Thyroid disease     TIA (transient ischemic attack) 2012       Diet Order: Cardiac  % Eaten:  No data found.     Pertinent Medications: [x]Reviewed:   Pertinent Labs: [x]Reviewed:   Food Allergies: [x]NKFA  []Other   Last BM: 12/24  Edema:        []RUE   []LUE   [x]RLE 3+  [x]LLE 4+      Pressure Injury:      [] Stage I   [] Stage II   [] Stage III   [] Stage IV      Wt Readings from Last 30 Encounters:   12/21/17 94.3 kg (208 lb)   08/13/17 94.3 kg (208 lb)   01/27/17 99.2 kg (218 lb 9.6 oz)   01/06/17 99.3 kg (219 lb)   12/13/16 105.1 kg (231 lb 12.8 oz)   10/14/16 102 kg (224 lb 13.9 oz)   09/10/16 108.4 kg (239 lb)   06/02/16 108.5 kg (239 lb 4 oz)   11/09/15 101.6 kg (224 lb)   02/12/13 109.8 kg (242 lb)   02/07/13 108.9 kg (240 lb)   01/30/13 104.3 kg (230 lb)   12/12/12 104.5 kg (230 lb 6.4 oz)   08/30/12 105.2 kg (232 lb)   04/10/12 107 kg (236 lb)   01/25/12 107 kg (236 lb)   06/14/11 109.8 kg (242 lb)   06/13/11 109.8 kg (242 lb)   04/15/11 110.2 kg (243 lb)   04/01/11 110.6 kg (243 lb 12.8 oz)   03/11/11 109.8 kg (242 lb)       Anthropometrics:   Height: 6' 1\" (185.4 cm) Weight: 94.3 kg (208 lb)   IBW (%IBW):   ( ) UBW (%UBW):   (  %)   Last Weight Metrics:  Weight Loss Metrics 12/21/2017 8/13/2017 1/27/2017 1/6/2017 12/13/2016 10/14/2016 9/10/2016   Today's Wt 208 lb 208 lb 218 lb 9.6 oz 219 lb 231 lb 12.8 oz 224 lb 13.9 oz 239 lb   BMI 27.44 kg/m2 27.44 kg/m2 28.84 kg/m2 28.89 kg/m2 30.58 kg/m2 29.67 kg/m2 31.53 kg/m2       BMI: Body mass index is 27.44 kg/(m^2). This BMI is indicative of:   []Underweight    []Normal    [x]Overweight    [] Obesity   [] Extreme Obesity (BMI>40)     Estimated Nutrition Needs (Based on):   2380 Kcals/day (BMR: 1835 x 1.3) , 75 g (0.8 g/kg) Protein  Carbohydrate: At Least 130 g/day  Fluids: 2380 mL/day (1ml/kcal) or per primary team    NUTRITION DIAGNOSES:   Problem:  No nutritional diagnosis at this time      Etiology: related to       Signs/Symptoms: as evidenced by        NUTRITION INTERVENTIONS:  Meals/Snacks: General/healthful diet                  GOAL:   consume >75% of meals in 5-7 days    LEARNING NEEDS (Diet, Food/Nutrient-Drug Interaction):    [x] None Identified   [] Identified and Education Provided/Documented   [] Identified and Pt declined/was not appropriate     Cultureal, Gnosticism, OR Ethnic Dietary Needs:    [x] None Identified   [] Identified and Addressed     [x] Interdisciplinary Care Plan Reviewed/Documented    [x] Discharge Planning:  General healthy diet     MONITORING /EVALUATION:      Food/Nutrient Intake Outcomes:  Total energy intake  Physical Signs/Symptoms Outcomes: Weight/weight change, Electrolyte and renal profile, GI, Glucose profile    NUTRITION RISK:    [] High              [] Moderate           [x]  Low  []  Minimal/Uncompromised    PT SEEN FOR:    []  MD Consult: []Calorie Count      []Diabetic Diet Education        []Diet Education     []Electrolyte Management     []General Nutrition Management and Supplements     []Management of Tube Feeding     []TPN Recommendations    []  RN Referral:  []MST score >=2     []Enteral/Parenteral Nutrition PTA     []Pregnant: Gestational DM or Multigestation     []Pressure Ulcer/Wound Care needs        []  Low BMI  [x]  LOS Referral       Saurav Mejia RDN  Pager [phone]  Weekend Pager [phone]

## 2017-12-26 NOTE — ROUTINE PROCESS
Put patient on table and in scan room. Patient said he could not do test. Refusing. Wants to go back to his room. This was the third time we have tried to do Patient.

## 2017-12-26 NOTE — CONSULTS
Vascular Surgery Consult Note  12/26/2017    Subjective:     Lianet Alberto is a 47 y.o.  with a pmhx significant for hemorrhagic CVA with residual left hemiplegia, seizure disorder, chronic pain syndrome that is methadone dependent, HTN, and HLD. He is an every day smoker. He denies a history of DM. This was confirmed with a A1c of 5.0. He is admitted to the hospital with BLE cellulitis. He has a chronic left foot wound to the dorsum of his foot. He reports this has been ongoing x 1 year. He reports that in July it was nearly healed when he stepped in a mixture of water, oil, and freon while at work then walked around in a wet work boot for the next two days. After that the wound broke down. More recently approximately 3 weeks ago. He wore wet tennis shoes for 2 days and again the wound deteriorated. He has been followed by podiatrist Dr. Maryellen Gee who has done mutiple debridements without success. There was some concern for possible PVD. ABIs 12/22/17 segmental pressures in both legs were not measurable due to extensive arterial calcification. The ankle/brachial indexes were therefore unreliable predictors of distal arterial perfusions. MRI of the LLE has been attempted 3 times wo success due to pain and claustrophobia. Plan is for MRI on Thursday with sedation. We have been asked to evaluate. Patient was recently diagnosed with a bladder mass at [facility].  He states he had a \"study at that hospital\" and they told him the infection was not in the bone. This was approximately 3 weeks ago.       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 His sister is his NOK. Prior to Admission medications    Medication Sig Start Date End Date Taking? Authorizing Provider   METHADONE HCL (METHADONE, BULK,) Take 65 mg by mouth daily. Per THE [facility] [phone] Dr. Angelo Riddle  Verified 34-11-42   Yes Alona Lopez MD   levothyroxine (SYNTHROID) 75 mcg tablet Take 75 mcg by mouth Daily (before breakfast). Yes Saravanan Horn MD   aspirin 81 mg chewable tablet Take 1 Tab by mouth daily. 12/13/12  Yes Erika Roman MD   lisinopril (PRINIVIL, ZESTRIL) 20 mg tablet Take 1 Tab by mouth daily. 6/22/12  Yes Abiodun Chase NP   allopurinol (ZYLOPRIM) 100 mg tablet Take  by mouth daily. Historical Provider   colchicine (COLCRYS) 0.6 mg tablet Take 0.6 mg by mouth daily. Saravanan Horn MD     Allergies   Allergen Reactions    Sulfamethoxazole-Trimethoprim Rash     L eye and L hand    Ciprofloxacin Hives     Per pcp records    Codeine Hives     Tolerates dilaudid, oxycodone    Lyrica [Pregabalin] Hives    Ultram [Tramadol] Hives        Review of Systems:  Review of Systems   Constitutional: Negative for chills and fever. HENT: Negative. Eyes: Negative. Respiratory: Negative for cough, chest tightness and shortness of breath. Cardiovascular: Positive for leg swelling. Negative for chest pain. Gastrointestinal: Negative. Genitourinary: Negative for dysuria and frequency. Musculoskeletal: Positive for arthralgias and joint swelling. Skin: Positive for color change and wound. Neurological: Negative. Hematological: Negative. Psychiatric/Behavioral: Negative.         Objective:       Patient Vitals for the past 24 hrs:   BP Temp Pulse Resp SpO2   12/26/17 1427 144/80 97.4 °F (36.3 °C) (!) 51 18 95 %   12/26/17 1042 (!) 160/102 98.5 °F (36.9 °C) 66 18 96 %   12/26/17 0617 (!) 159/95 98.1 °F (36.7 °C) 62 18 96 %   12/25/17 2230 134/79 98.2 °F (36.8 °C) (!) 53 18 96 %   12/25/17 1908 158/88 - - - -   12/25/17 1830 (!) 161/100 98.1 °F (36.7 °C) (!) 54 18 98 %     ---------------------------------------------------------------------------------------------------------    Physical Exam:   Physical Exam   Constitutional: He is oriented to person, place, and time. Patient is a disheveled, unkept male in NAD    HENT:   Head: Normocephalic. Eyes: Pupils are equal, round, and reactive to light. Neck: Normal range of motion. Cardiovascular: Normal rate and regular rhythm. Pulses:       Femoral pulses are 2+ on the right side, and 2+ on the left side. Popliteal pulses are 2+ on the right side, and 2+ on the left side. Dorsalis pedis pulses are 2+ on the right side, and 2+ on the left side. Posterior tibial pulses are 2+ on the right side, and 2+ on the left side. BLE edema. Feet are warm to the touch. Pulmonary/Chest: Effort normal and breath sounds normal.   Abdominal: Soft. Bowel sounds are normal.   Neurological: He is alert and oriented to person, place, and time. Right hemiplegia.     Skin:            Pertinent Test Results:   Recent Results (from the past 24 hour(s))   METABOLIC PANEL, BASIC    Collection Time: 12/26/17  4:29 AM   Result Value Ref Range    Sodium 136 136 - 145 mmol/L    Potassium 4.0 3.5 - 5.1 mmol/L    Chloride 101 97 - 108 mmol/L    CO2 29 21 - 32 mmol/L    Anion gap 6 5 - 15 mmol/L    Glucose 98 65 - 100 mg/dL    BUN 10 6 - 20 MG/DL    Creatinine 0.63 (L) 0.70 - 1.30 MG/DL    BUN/Creatinine ratio 16 12 - 20      GFR est AA >60 >60 ml/min/1.73m2    GFR est non-AA >60 >60 ml/min/1.73m2    Calcium 8.7 8.5 - 10.1 MG/DL   CBC W/O DIFF    Collection Time: 12/26/17  4:29 AM   Result Value Ref Range    WBC 9.2 4.1 - 11.1 K/uL    RBC 4.17 4.10 - 5.70 M/uL    HGB 11.3 (L) 12.1 - 17.0 g/dL HCT 35.9 (L) 36.6 - 50.3 %    MCV 86.1 80.0 - 99.0 FL    MCH 27.1 26.0 - 34.0 PG    MCHC 31.5 30.0 - 36.5 g/dL    RDW 17.1 (H) 11.5 - 14.5 %    PLATELET 563 712 - 600 K/uL   MAGNESIUM    Collection Time: 12/26/17  4:29 AM   Result Value Ref Range    Magnesium 2.0 1.6 - 2.4 mg/dL   PHOSPHORUS    Collection Time: 12/26/17  4:29 AM   Result Value Ref Range    Phosphorus 3.6 2.6 - 4.7 MG/DL         Results from Hospital Encounter encounter on 12/21/17   XR FOOT LT MIN 3 V   Narrative EXAM:  XR FOOT LT MIN 3 V    INDICATION:   Left foot wound. COMPARISON:  None. FINDINGS:  Three views of the left foot demonstrate no definite fracture or bony  destructive lesion. There is osteopenia. Study is mildly limited by  positioning. . . Impression IMPRESSION:  No acute abnormality identified. .              Assessmen/Plan:     Consult  Problem:  Non-healing foot wound of the LLE  Wound positive for pseudomonas/diptheroids, and Group B strep. Active Problems:  Bilateral cellulitis of lower leg (12/21/2017)  Bladder Mass   Remote h/o CVA secondary to brain aneurysm with residual left hemiplegia  Seizure disorder   TIA  Thromboembolus  HTN  HLD  Thyroid disease     Dr Maksim Cabral to evaluate this afternoon. May need BLE arteriogram to determine perfusion of extremities. Followed by wound debridement vs amputation depending on results of MRI. Management of comorbid conditions by primary team and infectious disease. Patient will need outpatient follow up with Urology for bx of bladder mass.       VTE prophylaxis  LMWH    Disposition:  TBD      Signed By: Shawanda López NP     December 26, 2017

## 2017-12-26 NOTE — WOUND CARE
Wound care Nurse consult from Dr Michelle Hdz for chronic left foot wound. Dr Asad Plaza, podiatry, is following patients wound and has written wound care orders for the patient. She also wants patient seen by vascular surgeons to promote healing in this left foot.  nurse to defer to Dr Isaac Cervantes orders.   Gareth Figueroa RN, [employer]

## 2017-12-26 NOTE — PROGRESS NOTES
Appreciate palliative care consult . spoke to Dr Marek Rosas will be seen tomorrow.     Juany Sinha MD

## 2017-12-26 NOTE — PROGRESS NOTES
Hospitalist Progress Note    NAME: Evelina Vela   :  1965   MRN:  [id number]     Presented to ED with worsening L LE swelling/pain. Recent admission at [facility].   Attempted to get record from [facility] nut medical record was closed on weekend. Assessment / Plan:  Bilateral LE cellulitis POA with purulent drainage  Chronic non healing L Foot ulcer/wound with cellulitis POA  - R LE swelling - slowly improving. Leukocytosis is down. L foot drain persist   Continue to report that pain is not controlled   Baseline: chronic left foot wound. eft knee contracture and swelling lower leg related to previous stroke. -AB: zosyn + Vanc day 6  BC NTD, wound cultures: pseudomonas S zosyn     ID help appreciated   -MRI - unable to complete  due to pain  D/w pt again today impotence of MRI, he agreed to try; will pre medicate with IV dilaudid   -pain management: pt reports poorly controlled; consulted palliative care team to help with management   -seen by podiatrist Dr Vincent Sepulveda:    felt that this is more a vascular issue. Reccommended to consider revascularization priot to any type of debridement of tissue or bone  : d/w Dr Vincent Sepulveda vascular surgery input. Recommended that wound ulikely will heal well s/p sharp debridement. Very non compliant pt. Probably will try santyl debridement first and try to get infection under control. Pt was supposed to be seen , no new note on chart from podiatrist.  Addendum:     : d/w Dr Vincent Sepulveda who strongly believes that pt will be better served by vascular surgeon at this point. D/w vascular NP pts case and asked to re assess pt. All the consultants help and effort are greatly appreciated    -seen by vascular : pt has a palpable left DP pulse as well as normal signals by Doppler study. Both are consistent with normal/adequate circulation for healing. No further vascular workup or treatment needed.  Will see PRN.  -amputation was mentioned by vascular to the pt. Pt is adamant against it now. -LE dopplers negative for DVT     Essential HTN POA  -BP high side at 160s - likely due to pain   -continue home lisinopril     Left Hemiparesis from Old CVA POA  Chronic Pain syndrome on maintenance [facility]adone at [facility] mg daily   Hyperlipidemia, not on meds at home   Gout arthritis, cont allopurinol               Code Status: Full  Surrogate Decision Maker: sister Gabe Barnett # [phone]  DVT Prophylaxis: SQ Lovenox    Baseline: lives alone, ambulating with cane  Body mass index is 27.44 kg/(m^2). Recommended Disposition: TBD     Subjective:     Chief Complaint / Reason for Physician Visit: left foot cellulitis / HTN   Pain: pt continue to report that pain is not controlled ; stated that even 4 mg of dilaudid is nor touching him   R elbow gout flair : improving     Discussed with RN events overnight. Review of Systems:  Symptom Y/N Comments  Symptom Y/N Comments   Fever/Chills n   Chest Pain n    Poor Appetite    Edema     Cough    Abdominal Pain n    Sputum    Joint Pain     SOB/BECK n   Pruritis/Rash     Nausea/vomit n   Tolerating PT/OT     Diarrhea n   Tolerating Diet     Constipation n   Other       Could NOT obtain due to:      Objective:     VITALS:   Last 24hrs VS reviewed since prior progress note.  Most recent are:  Patient Vitals for the past 24 hrs:   Temp Pulse Resp BP SpO2   12/26/17 1042 98.5 °F (36.9 °C) 66 18 (!) 160/102 96 %   12/26/17 0617 98.1 °F (36.7 °C) 62 18 (!) 159/95 96 %   12/25/17 2230 98.2 °F (36.8 °C) (!) 53 18 134/79 96 %   12/25/17 1908 - - - 158/88 -   12/25/17 1830 98.1 °F (36.7 °C) (!) 54 18 (!) 161/100 98 %   12/25/17 1515 98.1 °F (36.7 °C) (!) 54 17 (!) 168/91 95 %       Intake/Output Summary (Last 24 hours) at 12/26/17 1105  Last data filed at 12/26/17 0256   Gross per 24 hour   Intake                0 ml   Output             1200 ml   Net            -1200 ml        PHYSICAL EXAM:  General: WD, WN. Alert, cooperative, no acute distress    EENT:  EOMI. Anicteric sclerae. MMM  Resp:  CTA bilaterally, no wheezing or rales. No accessory muscle use  CV:  Regular  rhythm,  R LE swelling slowly improving   GI:  Soft, Non distended, Non tender.  +Bowel sounds  Neurologic:  Alert and oriented X 3, normal speech,   Psych:   Good insight. Not anxious nor agitated  Skin:  No rashes. No jaundice                          Wound seen continued with drainage                                                12/22          Reviewed most current lab test results and cultures  YES  Reviewed most current radiology test results   YES  Review and summation of old records today    NO  Reviewed patient's current orders and MAR    YES  PMH/SH reviewed - no change compared to H&P  ________________________________________________________________________  Care Plan discussed with:    Comments   Patient y    Family      RN y    Care Manager y    Consultant                       y Multidiciplinary team rounds were held today with , nursing, pharmacist and clinical coordinator. Patient's plan of care was discussed; medications were reviewed and discharge planning was addressed. ________________________________________________________________________  Total NON critical care TIME:  35 Minutes    Total CRITICAL CARE TIME Spent:   Minutes non procedure based      Comments   >50% of visit spent in counseling and coordination of care     ________________________________________________________________________  Sheryl Ross MD     Procedures: see electronic medical records for all procedures/Xrays and details which were not copied into this note but were reviewed prior to creation of Plan. LABS:  I reviewed today's most current labs and imaging studies.   Pertinent labs include:  Recent Labs      12/26/17   0429  12/24/17   0649   WBC  9.2  12.5*   HGB  11.3*  11.5*   HCT  35.9*  35.8*   PLT  249 215     Recent Labs      12/26/17   0429  12/25/17   0441  12/24/17   0649   NA  136  138  139   K  4.0  4.0  4.1   CL  101  103  103   CO2  29  28  29   GLU  98  125*  115*   BUN  10  9  7   CREA  0.63*  0.64*  0.69*   CA  8.7  8.2*  8.1*   MG  2.0   --    --    PHOS  3.6   --    --        Signed: Donnell Jerry MD

## 2017-12-26 NOTE — PROGRESS NOTES
Discussed case with Dr. Aria Li. Per MD pt needs MRI with sedation. PT unable to tolerate MRI x 3 attempts. Placed pt on schedule of Thursday at 1400. MD ok with date and time. Sobeida Morales RN made aware. Pt to be NPO after midnight Wednesday night for procedure. Order placed in [facility].

## 2017-12-27 PROBLEM — R53.81 PHYSICAL DEBILITY: Status: ACTIVE | Noted: 2017-12-27

## 2017-12-27 PROBLEM — Z79.891 LONG TERM CURRENT USE OF METHADONE FOR PAIN CONTROL: Status: ACTIVE | Noted: 2017-12-27

## 2017-12-27 PROBLEM — F32.9 MAJOR DEPRESSIVE DISORDER WITH CURRENT ACTIVE EPISODE: Status: ACTIVE | Noted: 2017-12-27

## 2017-12-27 LAB
ANION GAP SERPL CALC-SCNC: 7 MMOL/L (ref 5–15)
BACTERIA SPEC CULT: ABNORMAL
BUN SERPL-MCNC: 10 MG/DL (ref 6–20)
BUN/CREAT SERPL: 18 (ref 12–20)
CALCIUM SERPL-MCNC: 8.8 MG/DL (ref 8.5–10.1)
CHLORIDE SERPL-SCNC: 102 MMOL/L (ref 97–108)
CO2 SERPL-SCNC: 29 MMOL/L (ref 21–32)
CREAT SERPL-MCNC: 0.57 MG/DL (ref 0.7–1.3)
GLUCOSE SERPL-MCNC: 99 MG/DL (ref 65–100)
GRAM STN SPEC: ABNORMAL
POTASSIUM SERPL-SCNC: 4.1 MMOL/L (ref 3.5–5.1)
SERVICE CMNT-IMP: ABNORMAL
SODIUM SERPL-SCNC: 138 MMOL/L (ref 136–145)

## 2017-12-27 PROCEDURE — 80048 BASIC METABOLIC PNL TOTAL CA: CPT | Performed by: INTERNAL MEDICINE

## 2017-12-27 PROCEDURE — 74011250637 HC RX REV CODE- 250/637: Performed by: HOSPITALIST

## 2017-12-27 PROCEDURE — 74011250636 HC RX REV CODE- 250/636: Performed by: INTERNAL MEDICINE

## 2017-12-27 PROCEDURE — 77030018836 HC SOL IRR NACL ICUM -A

## 2017-12-27 PROCEDURE — 65660000000 HC RM CCU STEPDOWN

## 2017-12-27 PROCEDURE — 74011000258 HC RX REV CODE- 258: Performed by: INTERNAL MEDICINE

## 2017-12-27 PROCEDURE — 74011250637 HC RX REV CODE- 250/637: Performed by: INTERNAL MEDICINE

## 2017-12-27 PROCEDURE — 74011250637 HC RX REV CODE- 250/637: Performed by: NURSE PRACTITIONER

## 2017-12-27 PROCEDURE — 74011250636 HC RX REV CODE- 250/636: Performed by: HOSPITALIST

## 2017-12-27 PROCEDURE — 36415 COLL VENOUS BLD VENIPUNCTURE: CPT | Performed by: INTERNAL MEDICINE

## 2017-12-27 RX ORDER — LISINOPRIL 20 MG/1
40 TABLET ORAL DAILY
Status: DISCONTINUED | OUTPATIENT
Start: 2017-12-28 | End: 2018-01-13 | Stop reason: HOSPADM

## 2017-12-27 RX ORDER — METHADONE HYDROCHLORIDE 10 MG/1
80 TABLET ORAL DAILY
Status: DISCONTINUED | OUTPATIENT
Start: 2017-12-28 | End: 2018-01-04

## 2017-12-27 RX ORDER — HYDROMORPHONE HYDROCHLORIDE 2 MG/1
8 TABLET ORAL
Status: DISCONTINUED | OUTPATIENT
Start: 2017-12-27 | End: 2018-01-04

## 2017-12-27 RX ADMIN — ALLOPURINOL 100 MG: 100 TABLET ORAL at 09:06

## 2017-12-27 RX ADMIN — HYDROMORPHONE HYDROCHLORIDE 4 MG: 2 TABLET ORAL at 01:08

## 2017-12-27 RX ADMIN — DOCUSATE SODIUM 100 MG: 100 CAPSULE, LIQUID FILLED ORAL at 18:27

## 2017-12-27 RX ADMIN — Medication 10 ML: at 23:54

## 2017-12-27 RX ADMIN — Medication 1 CAPSULE: at 09:04

## 2017-12-27 RX ADMIN — FAMOTIDINE 20 MG: 20 TABLET ORAL at 09:06

## 2017-12-27 RX ADMIN — HYDROMORPHONE HYDROCHLORIDE 8 MG: 2 TABLET ORAL at 12:18

## 2017-12-27 RX ADMIN — METHADONE HYDROCHLORIDE 15 MG: 10 TABLET ORAL at 12:18

## 2017-12-27 RX ADMIN — COLCHICINE 0.6 MG: 0.6 TABLET, FILM COATED ORAL at 09:16

## 2017-12-27 RX ADMIN — SODIUM CHLORIDE: 900 INJECTION, SOLUTION INTRAVENOUS at 15:24

## 2017-12-27 RX ADMIN — HYDROMORPHONE HYDROCHLORIDE 8 MG: 2 TABLET ORAL at 23:55

## 2017-12-27 RX ADMIN — DOCUSATE SODIUM 100 MG: 100 CAPSULE, LIQUID FILLED ORAL at 09:05

## 2017-12-27 RX ADMIN — SODIUM CHLORIDE: 900 INJECTION, SOLUTION INTRAVENOUS at 04:38

## 2017-12-27 RX ADMIN — Medication 10 ML: at 15:25

## 2017-12-27 RX ADMIN — HYDROMORPHONE HYDROCHLORIDE 4 MG: 2 TABLET ORAL at 05:33

## 2017-12-27 RX ADMIN — ENOXAPARIN SODIUM 40 MG: 40 INJECTION SUBCUTANEOUS at 16:18

## 2017-12-27 RX ADMIN — COLCHICINE 0.6 MG: 0.6 TABLET, FILM COATED ORAL at 18:27

## 2017-12-27 RX ADMIN — HYDROMORPHONE HYDROCHLORIDE 8 MG: 2 TABLET ORAL at 16:18

## 2017-12-27 RX ADMIN — FAMOTIDINE 20 MG: 20 TABLET ORAL at 18:27

## 2017-12-27 RX ADMIN — METHADONE HYDROCHLORIDE 65 MG: 10 TABLET ORAL at 09:06

## 2017-12-27 RX ADMIN — HYDROMORPHONE HYDROCHLORIDE 4 MG: 2 TABLET ORAL at 09:07

## 2017-12-27 RX ADMIN — SODIUM CHLORIDE: 900 INJECTION, SOLUTION INTRAVENOUS at 20:42

## 2017-12-27 RX ADMIN — HYDROMORPHONE HYDROCHLORIDE 8 MG: 2 TABLET ORAL at 19:56

## 2017-12-27 RX ADMIN — HYDROMORPHONE HYDROCHLORIDE 2 MG: 2 INJECTION, SOLUTION INTRAMUSCULAR; INTRAVENOUS; SUBCUTANEOUS at 04:36

## 2017-12-27 RX ADMIN — ASPIRIN 81 MG 81 MG: 81 TABLET ORAL at 09:06

## 2017-12-27 RX ADMIN — LISINOPRIL 20 MG: 20 TABLET ORAL at 09:05

## 2017-12-27 RX ADMIN — LEVOTHYROXINE SODIUM 75 MCG: 75 TABLET ORAL at 06:21

## 2017-12-27 NOTE — PROGRESS NOTES
Spiritual Care Assessment/Progress Notes    Cande Umaña [id number]  [id number]    1965  47 y.o.  male    Patient Telephone Number: [phone] (home)   Method[facility] Affiliation: [facility]   Language: English   Extended Emergency Contact Information  Primary Emergency Contact: [address] Phone: [phone]  Mobile Phone: [phone]  Relation: Other Relative   Patient Active Problem List    Diagnosis Date Noted    Long term current use of methadone for pain control 12/27/2017    Major depressive disorder with current active episode 12/27/2017    Physical debility 12/27/2017    HTN (hypertension) 12/21/2017    Bilateral cellulitis of lower leg 12/21/2017    Drug overdose 10/09/2016    Cerebral infarction ([facility] 12/12/2012    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    Cerebral hemorrhage with hemiparesis ([facility] 04/15/2011    Central pain syndrome 04/15/2011    [facility] 03/11/2011    Hypertension 03/11/2011    Thromboembolism ([facility] 03/11/2011        Date: 12/27/2017       Level of Methodist/Spiritual Activity:  []         Involved in kendy tradition/spiritual practice    []         Not involved in kendy tradition/spiritual practice  [x]         Spiritually oriented    []         Claims no spiritual orientation    []         seeking spiritual identity  []         Feels alienated from Sikh practice/tradition  []         Feels angry about Sikh practice/tradition  [x]         Spirituality/Sikh tradition is a resource for coping at this time.   []         Not able to assess due to medical condition    Services Provided Today:  []         crisis intervention    []         reading Scriptures  [x]         spiritual assessment    [x]         prayer  [x]         empathic listening/emotional support  []         rites and rituals (cite in comments)  []         life review     []         Sikh support  []         theological development   []         advocacy  [] ethical dialog     []         blessing  []         bereavement support    []         support to family  []         anticipatory grief support   []         help with AMD  []         spiritual guidance    []         meditation      Spiritual Care Needs  [x]         Emotional Support  [x]         Spiritual/Jainism Care  []         Loss/Adjustment  []         Advocacy/Referral                /Ethics  []         No needs expressed at               this time  []         Other: (note in               comments)  [address]  []         Follow up visits with               pt/family  []         Provide materials  []         Schedule sacraments  []         Contact Community               Clergy  [x]         Follow up as needed  []         Other: (note in               comments)   Initial Spiritual Assessment in 3241. Pt awake but appeared groggy. Upon introduction asked  if  was the one who prays overhead. Pt expressed appreciation for prayers. Asked  for contact information as he was dealing with heavy pain at the moment. Provided pt with card. Prayed at bedside and advised of availability and support as needed. Pt indicated that his two daughters are only form of support at this time. Provided words of reassurance. Will follow up as pt desires. TAQUERIA Martinez. Div

## 2017-12-27 NOTE — CONSULTS
Palliative Medicine Consult  Ashish: [phone] ([phone])    Patient Name: Shannan Thakur  YOB: 1965    Date of Initial Consult: 12/27/17  Reason for Consult:  Overwhelming sxs  Requesting Provider: Meet Smith   Primary Care Physician: Luis Alfredo Young MD     SUMMARY:   Shannan Thakur is a 47 y.o. with a past history of  HTN, sz, HLD, thyroid dz, aneurysm, thromboembolus, gout, TIA, who was admitted on 12/21/2017 from home with a diagnosis of cellulitis of lower extremity. Current medical issues leading to Palliative Medicine involvement include: Current medical issues leading to Palliative Medicine involvement include:pain management issues. Per records, pt was evaluated and treated for thalamic pain syndrome at [facility] and at pain management practice. Had been getting Oxycodone from PCP but has been dismissed from that practice as of Sept 2016 due to running out of oxycodone early. Per psychiatry 10/13/16 \"Reliability re subjective expressions is possibly questionable as when seen his reported pain level higher than observation would indicate though today matched. \"   PALLIATIVE DIAGNOSES:   1. BLE edema  2. Chronic wound  3. SOB: CXR neg  4. H/o thalamic pain syndrome: on maintenance Methadone at [facility]  5. Also h/o opioid dependence   6. Alcohol abuse  7. Debility: left-sided hemiparesis from old CVA   8. Depression      PLAN:   1. I spoke with Dr. Matthew Moreira, physician at [facility], and together we came up with the following plan for pain control:  1. Pain level should be based on observation, not patient rating. 2. Increase the Methadone to 80mg/day. 3. Increase the Dilaudid to 8mg PO q. 4 hours prn.  4. Discontinue the IV Dilaudid. 5. When pt is ready for discharge, do not provide any opioid prescriptions. The methadone will prevent withdrawal symptoms when the dilaudid is stopped. 6. Pt will need to follow-up with [facility].   2. Initial consult note routed to primary continuity provider  3. Communicated plan of care with: Palliative IDT, RN, Dr. Renetta Alfaro / TREATMENT PREFERENCES:     GOALS OF CARE:  Patient/Health Care Proxy Stated Goals: Prolong life  Optimal pain management    TREATMENT PREFERENCES:   Code Status: Full Code    Advance Care Planning:  Advance Care Planning 12/25/2017   Patient's Healthcare Decision Maker is: Verbal statement (Legal Next of Kin remains as decision maker)   Primary Decision Maker Name -   Confirm Advance Directive None       Medical Interventions: Full interventions   Other Instructions: Other:    As far as possible, the palliative care team has discussed with patient / health care proxy about goals of care / treatment preferences for patient. HISTORY:     History obtained from: chart, patient    CHIEF COMPLAINT: pain and wound     HPI/SUBJECTIVE:    The patient is:   [x] Verbal and participatory  [] Non-participatory due to:     Presented ambulatory to the ED with c/o gradually worsening and constant RLE swelling, redness, tightness and swelling x 2 days, with pain rated as moderate. In addition, his LLE has swelling and weeping, chronic wound on dorsal aspect of left foot, and SOB. Pt was seen in another ED the day before this visit. 12/27: pt was calm, pleasant, did not complain or argue when I told him I was changing his pain medications. Pt shared his history; he told me he used to drink a lot of ETOH, however, just up and quit on his own. (chart shows he was admitted for detox). He is , his wife left right after his stroke. He has 2 daughters. He reports that he goes to the methadone clinic for pain management: his left side pain is constant and severe; he was told that the best way to treat this pain is a brain stimulator, however, he is not ready to have someone mess with his brain. And now his foot hurts due to the wound. His pain is always 10/10.   The methadone has been the best at controlling his pain. Clinical Pain Assessment (nonverbal scale for severity on nonverbal patients):   Clinical Pain Assessment  Severity: 10          Duration: for how long has pt been experiencing pain (e.g., 2 days, 1 month, years)  Frequency: how often pain is an issue (e.g., several times per day, once every few days, constant)     FUNCTIONAL ASSESSMENT:     Palliative Performance Scale (PPS):  PPS: 40       PSYCHOSOCIAL/SPIRITUAL SCREENING:     Palliative IDT has assessed this patient for cultural preferences / practices and a referral made as appropriate to needs (Cultural Services, Patient Advocacy, Ethics, etc.)    Advance Care Planning:  Advance Care Planning 12/25/2017   Patient's Healthcare Decision Maker is: Verbal statement (Legal Next of Kin remains as decision maker)   Primary Decision Maker Name -   Confirm Advance Directive None       Any spiritual / Episcopalian concerns:  [] Yes /  [x] No    Caregiver Burnout:  [] Yes /  [] No /  [x] No Caregiver Present      Anticipatory grief assessment:   [x] Normal  / [] Maladaptive       ESAS Anxiety: Anxiety: 0    ESAS Depression: Depression: 5        REVIEW OF SYSTEMS:     Positive and pertinent negative findings in ROS are noted above in HPI. The following systems were [x] reviewed / [] unable to be reviewed as noted in HPI  Other findings are noted below. Systems: constitutional, ears/nose/mouth/throat, respiratory, gastrointestinal, genitourinary, musculoskeletal, integumentary, neurologic, psychiatric, endocrine. Positive findings noted below.   Modified ESAS Completed by: provider   Fatigue: 6 Drowsiness: 0   Depression: 5 Pain: 10   Anxiety: 0 Nausea: 0   Anorexia: 0 Dyspnea: 0     Constipation: No     Stool Occurrence(s): 1        PHYSICAL EXAM:     From RN flowsheet:  Wt Readings from Last 3 Encounters:   12/21/17 208 lb (94.3 kg)   08/13/17 208 lb (94.3 kg)   01/27/17 218 lb 9.6 oz (99.2 kg)     Blood pressure 138/84, pulse (!) 53, temperature 97.8 °F (36.6 °C), resp. rate 18, height 6' 1\" (1.854 m), weight 208 lb (94.3 kg), SpO2 98 %. Pain Scale 1: Numeric (0 - 10)  Pain Intensity 1: 9     Pain Location 1: Foot  Pain Orientation 1: Left  Pain Description 1: Aching  Pain Intervention(s) 1: Medication (see MAR)  Last bowel movement, if known:     Constitutional: WD, WN, NAD  Eyes: pupils equal, anicteric  ENMT: no nasal discharge, moist mucous membranes  Cardiovascular: regular rhythm, distal pulses intact  Respiratory: breathing not labored, symmetric  Gastrointestinal: soft non-tender, +bowel sounds  Musculoskeletal:  Muscle atrophy and contractures to left arm and leg  Skin: warm, dry, dressing covering left foot, hyperesthesia to left side  Neurologic: following commands, moving right extremities  Psychiatric:depressed affect         HISTORY:     Principal Problem:    Bilateral cellulitis of lower leg (12/21/2017)    Active Problems:    Cerebral infarction ([address] (12/12/2012)      HTN (hypertension) (12/21/2017)      Past Medical History:   Diagnosis Date    Aneurysm ([address]     (with stroke)    Gout     Hypercholesterolemia     Hypertension     Lesion of bladder     Seizures ([address]     Stroke ([address] 2006    left sided weakness    Thromboembolus ([address]     Thyroid disease     TIA (transient ischemic attack) 2012      Past Surgical History:   Procedure Laterality Date    HX ORTHOPAEDIC      KNEE ARTHROSCP HARV      left knee      Family History   Problem Relation Age of Onset    Diabetes Father     Diabetes Sister     Diabetes Brother       History reviewed, no pertinent family history.   Social History   Substance Use Topics    Smoking status: Current Every Day Smoker     Packs/day: 1.00    Smokeless tobacco: Never Used    Alcohol use 8.4 oz/week     14 Cans of beer per week     Allergies   Allergen Reactions    Sulfamethoxazole-Trimethoprim Rash     L eye and L hand    Ciprofloxacin Hives     Per pcp records    Codeine Hives     Tolerates dilaudid, oxycodone    Lyrica [Pregabalin] Hives    Ultram [Tramadol] Hives      Current Facility-Administered Medications   Medication Dose Route Frequency    [START ON 12/28/2017] methadone (DOLOPHINE) tablet 80 mg  80 mg Oral DAILY    HYDROmorphone (DILAUDID) tablet 8 mg  8 mg Oral Q4H PRN    docusate sodium (COLACE) capsule 100 mg  100 mg Oral BID    polyethylene glycol (MIRALAX) packet 17 g  17 g Oral DAILY    colchicine tablet 0.6 mg  0.6 mg Oral BID    allopurinol (ZYLOPRIM) tablet 100 mg  100 mg Oral DAILY    lactobac ac& pc-s.therm-b.anim (YULIA Q/RISAQUAD)  1 Cap Oral DAILY    famotidine (PEPCID) tablet 20 mg  20 mg Oral BID    aspirin chewable tablet 81 mg  81 mg Oral DAILY    levothyroxine (SYNTHROID) tablet 75 mcg  75 mcg Oral ACB    lisinopril (PRINIVIL, ZESTRIL) tablet 20 mg  20 mg Oral DAILY    sodium chloride (NS) flush 5-10 mL  5-10 mL IntraVENous Q8H    sodium chloride (NS) flush 5-10 mL  5-10 mL IntraVENous PRN    naloxone (NARCAN) injection 0.4 mg  0.4 mg IntraVENous PRN    ondansetron (ZOFRAN) injection 4 mg  4 mg IntraVENous Q4H PRN    enoxaparin (LOVENOX) injection 40 mg  40 mg SubCUTAneous Q24H    piperacillin-tazobactam 3.375 g in 0.9% sodium chloride 100 mL IVPB   IntraVENous Q8H          LAB AND IMAGING FINDINGS:     Lab Results   Component Value Date/Time    WBC 9.2 12/26/2017 04:29 AM    HGB 11.3 12/26/2017 04:29 AM    PLATELET 234 97/98/9056 04:29 AM     Lab Results   Component Value Date/Time    Sodium 138 12/27/2017 06:35 AM    Potassium 4.1 12/27/2017 06:35 AM    Chloride 102 12/27/2017 06:35 AM    CO2 29 12/27/2017 06:35 AM    BUN 10 12/27/2017 06:35 AM    Creatinine 0.57 12/27/2017 06:35 AM    Calcium 8.8 12/27/2017 06:35 AM    Magnesium 2.0 12/26/2017 04:29 AM    Phosphorus 3.6 12/26/2017 04:29 AM      Lab Results   Component Value Date/Time    AST (SGOT) 20 12/21/2017 11:28 AM    Alk.  phosphatase 240 12/21/2017 11:28 AM    Protein, total 7.0 12/21/2017 11:28 AM    Albumin 3.5 12/21/2017 11:28 AM    Globulin 3.5 12/21/2017 11:28 AM     Lab Results   Component Value Date/Time    INR 1.1 12/21/2017 11:28 AM    Prothrombin time 11.4 12/21/2017 11:28 AM    aPTT 29.3 12/21/2017 11:28 AM      No results found for: IRON, FE, TIBC, IBCT, PSAT, FERR   No results found for: PH, PCO2, PO2  No components found for: [place]   Lab Results   Component Value Date/Time    CK 77 12/21/2017 11:28 AM    CK - MB 1.1 12/21/2017 11:28 AM                Total time: 120 min  Counseling / coordination time, spent as noted above: 90 min  > 50% counseling / coordination?: yes    Prolonged service was provided for  []30 min   []75 min in face to face time in the presence of the patient, spent as noted above. Time Start:   Time End:   Note: this can only be billed with 45310 (initial) or 74080 (follow up). If multiple start / stop times, list each separately.

## 2017-12-27 NOTE — PROGRESS NOTES
Infectious Disease Progress Note        IMPRESSION:   1. Chronic ulcer dorsum of left foot , wound examined appears relatively clean , superficial  2. B/l  Lower extremity cellulitis  3. WC + for Pseudomonas, diphtheroids, thio broth only- Group B strep  4. Appreciate Vascular note today & plan for  further workup  5. Needs MRI to determine if underlying bone involvement present        PLAN:   1. Continue Pip- tazobactam IV , DC Vancomycin  2. MRI with sedation, plan for Thu( discussed with pt importance  for MRI in order to determine  plan of care)  3. Daily wound care , elevate both LE  4. [facility]:     Pt seen . Complains of pain  Both LE    Review of Systems:  A comprehensive review of systems was negative except for that written in the History of Present Illness. Objective:     Blood pressure 138/84, pulse (!) 53, temperature 97.8 °F (36.6 °C), resp. rate 18, height 6' 1\" (1.854 m), weight 94.3 kg (208 lb), SpO2 98 %. Temp (24hrs), Av.9 °F (36.6 °C), Min:97.4 °F (36.3 °C), Max:98.3 °F (36.8 °C)      Lines:  Peripheral IV:            Physical Exam:   General:  Cooperative, awake   Eyes:  Sclera anicteric. Pupils equally round and reactive to light. Mouth/Throat: Mucous membranes normal, oral pharynx clear   Neck: Supple   Lungs:   Clear to auscultation bilaterally, good effort   CV:  Regular rate and rhythm,no murmur, click, rub or gallop   Abdomen:   Soft, non-tender. bowel sounds normal. non-distended   Extremities: B/l lower extremity  Edema less,erythema  less,  Minimal warmth to touch Wound examined with RN - minimal slough , no odor.        Lymph nodes: Cervical and supraclavicular normal       Lines/Devices:  Intact, no erythema, drainage or tenderness   Psych: Alert and oriented, normal mood affect       Data Review:   CBC:   Recent Labs      17   0429   WBC  9.2   RBC  4.17   HGB  11.3*   HCT  35.9*   PLT  249     CMP:   Recent Labs 12/27/17   0635  12/26/17   0429  12/25/17   0441   GLU  99  98  125*   NA  138  136  138   K  4.1  4.0  4.0   CL  102  101  103   CO2  29  29  28   BUN  10  10  9   CREA  0.57*  0.63*  0.64*   CA  8.8  8.7  8.2*   AGAP  7  6  7   BUCR  18  16  14       Studies:      Lab Results   Component Value Date/Time    Culture result:  12/22/2017 01:30 PM     LIGHT PSEUDOMONAS AERUGINOSA (1ST STRAIN/COLONY TYPE)    Culture result:  12/22/2017 01:30 PM     LIGHT PSEUDOMONAS AERUGINOSA (2ND STRAIN/COLONY TYPE)    Culture result:  12/22/2017 01:30 PM     STREPTOCOCCI, BETA HEMOLYTIC GROUP B ISOLATED FROM THIO BROTH ONLY Penicillin and ampicillin are drugs of choice for treatment of beta-hemolytic streptococcal infections. Susceptibility testing of penicillins and beta-lactams approved by the FDA for treatment of beta-hemolytic streptococcal infections need not be performed routinely, because nonsusceptible isolates are extremely rare. CLSI 2012    Culture result: MODERATE DIPHTHEROIDS 12/22/2017 01:30 PM    Culture result:  12/22/2017 01:30 PM     ENTEROCOCCUS FAECALIS GROUP D ISOLATED FROM THIO BROTH ONLY          XR Results (most recent):    Results from Hospital Encounter encounter on 12/21/17   XR FOOT LT MIN 3 V   Narrative EXAM:  XR FOOT LT MIN 3 V    INDICATION:   Left foot wound. COMPARISON:  None. FINDINGS:  Three views of the left foot demonstrate no definite fracture or bony  destructive lesion. There is osteopenia. Study is mildly limited by  positioning. . . Impression IMPRESSION:  No acute abnormality identified. .              Patient Active Problem List   Diagnosis Code    Stroke ([facility] I63.9    Hypertension I10    Thromboembolism (HCC) I74.9    Cerebral hemorrhage with hemiparesis (HCC) I62.9, G81.90    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    Cerebral infarction (HCC) I63.9    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    Drug overdose T50.901A    HTN (hypertension) I10    Bilateral cellulitis of lower leg L03.116, Z66.092         ICD-10-CM ICD-9-CM    1. Cellulitis of lower extremity, unspecified laterality L03.119 682.6        I have discussed the diagnosis with the patient and the intended plan as seen in the above orders. I have discussed medication side effects and warnings with the patient as well.     Reviewed test results at length with patient    Anti-infectives:      Vancomycin IV  D5- DC   Pip- tazobactam IV D6  .  Obed Awan MD FACP

## 2017-12-27 NOTE — PROGRESS NOTES
Pt is a 48 y/o  male admitted with bilateral LE cellulitis and HTN. Pt resides alone and is independent to ADL's and IADL's with the use of a cane. Pt stated he had [facility] in the past but stated he started doing his wound care as he stated \"I could do what they were doing\". SW inquired if he was properly caring for his foot after [place] left the home. Pt attributed his wound care to him not staying off of his foot and continuing to work. Pt stated he would be open to [place] again if recommended at discharge and he would like to use [facility] again. SW will continue to follow and assist with additional discharge needs. Care Management Interventions  PCP Verified by CM:  Yes  Transition of Care Consult (CM Consult): Discharge Planning (Pt had [facility] [place] about a month ago for wound care)  Discharge Durable Medical Equipment:  (Pt uses a cane for ambulation)  Physical Therapy Consult: Yes  Occupational Therapy Consult: Yes  Current Support Network: Own Home  Confirm Follow Up Transport: Self (Pt drives but has supportive family to assist if needed)  Plan discussed with Pt/Family/Caregiver: Yes  Discharge Location  Discharge Placement: Home    JUVENCIO Black  Ext [phone]

## 2017-12-27 NOTE — ROUTINE PROCESS
Bedside and Verbal shift change report given to Lashay (oncoming nurse) by Erica Grant RN (offgoing nurse). Report included the following information SBAR, Kardex, ED Summary, Intake/Output, MAR, Accordion, Recent Results, Med Rec Status and Cardiac Rhythm NSR.

## 2017-12-27 NOTE — PROGRESS NOTES
I spoke with Dr. Bruce Elizabeth and he feels this patient has adequate arterial profusion to undergo debridement of tissue and possibly bone left foot. I will schedule this procedure for Friday 12/29/1 pending MRI of the left foot. MRI is scheduled for today.   I have spoken with the patient regarding the procedure, however I will visit and review the procedure again with the patient  pending MRI results

## 2017-12-28 ENCOUNTER — APPOINTMENT (OUTPATIENT)
Dept: MRI IMAGING | Age: 52
DRG: 383 | End: 2017-12-28
Attending: HOSPITALIST
Payer: MEDICAID

## 2017-12-28 LAB
ANION GAP SERPL CALC-SCNC: 6 MMOL/L (ref 5–15)
BUN SERPL-MCNC: 13 MG/DL (ref 6–20)
BUN/CREAT SERPL: 20 (ref 12–20)
CALCIUM SERPL-MCNC: 8.6 MG/DL (ref 8.5–10.1)
CHLORIDE SERPL-SCNC: 100 MMOL/L (ref 97–108)
CO2 SERPL-SCNC: 29 MMOL/L (ref 21–32)
CREAT SERPL-MCNC: 0.64 MG/DL (ref 0.7–1.3)
GLUCOSE SERPL-MCNC: 107 MG/DL (ref 65–100)
POTASSIUM SERPL-SCNC: 4.1 MMOL/L (ref 3.5–5.1)
SODIUM SERPL-SCNC: 135 MMOL/L (ref 136–145)

## 2017-12-28 PROCEDURE — 74011250637 HC RX REV CODE- 250/637: Performed by: HOSPITALIST

## 2017-12-28 PROCEDURE — 74011000258 HC RX REV CODE- 258: Performed by: INTERNAL MEDICINE

## 2017-12-28 PROCEDURE — 74011250636 HC RX REV CODE- 250/636: Performed by: INTERNAL MEDICINE

## 2017-12-28 PROCEDURE — 74011250637 HC RX REV CODE- 250/637: Performed by: INTERNAL MEDICINE

## 2017-12-28 PROCEDURE — 97161 PT EVAL LOW COMPLEX 20 MIN: CPT

## 2017-12-28 PROCEDURE — 80048 BASIC METABOLIC PNL TOTAL CA: CPT | Performed by: INTERNAL MEDICINE

## 2017-12-28 PROCEDURE — 74011250636 HC RX REV CODE- 250/636: Performed by: HOSPITALIST

## 2017-12-28 PROCEDURE — 65660000000 HC RM CCU STEPDOWN

## 2017-12-28 PROCEDURE — 36415 COLL VENOUS BLD VENIPUNCTURE: CPT | Performed by: INTERNAL MEDICINE

## 2017-12-28 PROCEDURE — 97530 THERAPEUTIC ACTIVITIES: CPT

## 2017-12-28 PROCEDURE — A9576 INJ PROHANCE MULTIPACK: HCPCS | Performed by: HOSPITALIST

## 2017-12-28 PROCEDURE — 74011250636 HC RX REV CODE- 250/636: Performed by: RADIOLOGY

## 2017-12-28 PROCEDURE — 74011250637 HC RX REV CODE- 250/637: Performed by: NURSE PRACTITIONER

## 2017-12-28 PROCEDURE — 73720 MRI LWR EXTREMITY W/O&W/DYE: CPT

## 2017-12-28 RX ORDER — MIDAZOLAM HYDROCHLORIDE 1 MG/ML
5 INJECTION, SOLUTION INTRAMUSCULAR; INTRAVENOUS
Status: DISCONTINUED | OUTPATIENT
Start: 2017-12-28 | End: 2017-12-28

## 2017-12-28 RX ORDER — FENTANYL CITRATE 50 UG/ML
100 INJECTION, SOLUTION INTRAMUSCULAR; INTRAVENOUS
Status: DISCONTINUED | OUTPATIENT
Start: 2017-12-28 | End: 2017-12-28

## 2017-12-28 RX ADMIN — MIDAZOLAM 1 MG: 1 INJECTION INTRAMUSCULAR; INTRAVENOUS at 14:50

## 2017-12-28 RX ADMIN — HYDROMORPHONE HYDROCHLORIDE 8 MG: 2 TABLET ORAL at 03:58

## 2017-12-28 RX ADMIN — LEVOTHYROXINE SODIUM 75 MCG: 75 TABLET ORAL at 07:58

## 2017-12-28 RX ADMIN — LISINOPRIL 40 MG: 20 TABLET ORAL at 08:01

## 2017-12-28 RX ADMIN — MIDAZOLAM 1 MG: 1 INJECTION INTRAMUSCULAR; INTRAVENOUS at 14:25

## 2017-12-28 RX ADMIN — FENTANYL CITRATE 50 MCG: 50 INJECTION, SOLUTION INTRAMUSCULAR; INTRAVENOUS at 14:15

## 2017-12-28 RX ADMIN — ASPIRIN 81 MG 81 MG: 81 TABLET ORAL at 08:00

## 2017-12-28 RX ADMIN — ALLOPURINOL 100 MG: 100 TABLET ORAL at 08:00

## 2017-12-28 RX ADMIN — DOCUSATE SODIUM 100 MG: 100 CAPSULE, LIQUID FILLED ORAL at 08:00

## 2017-12-28 RX ADMIN — METHADONE HYDROCHLORIDE 80 MG: 10 TABLET ORAL at 08:00

## 2017-12-28 RX ADMIN — SODIUM CHLORIDE: 900 INJECTION, SOLUTION INTRAVENOUS at 03:59

## 2017-12-28 RX ADMIN — HYDROMORPHONE HYDROCHLORIDE 8 MG: 2 TABLET ORAL at 23:30

## 2017-12-28 RX ADMIN — FAMOTIDINE 20 MG: 20 TABLET ORAL at 08:00

## 2017-12-28 RX ADMIN — Medication 1 CAPSULE: at 08:01

## 2017-12-28 RX ADMIN — POLYETHYLENE GLYCOL 3350 17 G: 17 POWDER, FOR SOLUTION ORAL at 08:01

## 2017-12-28 RX ADMIN — FENTANYL CITRATE 25 MCG: 50 INJECTION, SOLUTION INTRAMUSCULAR; INTRAVENOUS at 14:52

## 2017-12-28 RX ADMIN — Medication 10 ML: at 06:51

## 2017-12-28 RX ADMIN — COLCHICINE 0.6 MG: 0.6 TABLET, FILM COATED ORAL at 08:07

## 2017-12-28 RX ADMIN — HYDROMORPHONE HYDROCHLORIDE 8 MG: 2 TABLET ORAL at 07:58

## 2017-12-28 RX ADMIN — ENOXAPARIN SODIUM 40 MG: 40 INJECTION SUBCUTANEOUS at 16:21

## 2017-12-28 RX ADMIN — HYDROMORPHONE HYDROCHLORIDE 8 MG: 2 TABLET ORAL at 16:20

## 2017-12-28 RX ADMIN — FAMOTIDINE 20 MG: 20 TABLET ORAL at 17:03

## 2017-12-28 RX ADMIN — MIDAZOLAM 2 MG: 1 INJECTION INTRAMUSCULAR; INTRAVENOUS at 14:10

## 2017-12-28 RX ADMIN — SODIUM CHLORIDE: 900 INJECTION, SOLUTION INTRAVENOUS at 20:12

## 2017-12-28 RX ADMIN — DOCUSATE SODIUM 100 MG: 100 CAPSULE, LIQUID FILLED ORAL at 17:03

## 2017-12-28 RX ADMIN — COLCHICINE 0.6 MG: 0.6 TABLET, FILM COATED ORAL at 17:03

## 2017-12-28 RX ADMIN — HYDROMORPHONE HYDROCHLORIDE 8 MG: 2 TABLET ORAL at 12:41

## 2017-12-28 RX ADMIN — GADOTERIDOL 20 ML: 279.3 INJECTION, SOLUTION INTRAVENOUS at 14:59

## 2017-12-28 RX ADMIN — HYDROMORPHONE HYDROCHLORIDE 8 MG: 2 TABLET ORAL at 20:05

## 2017-12-28 NOTE — PROGRESS NOTES
Problem: Mobility Impaired (Adult and Pediatric)  Goal: *Acute Goals and Plan of Care (Insert Text)  Physical Therapy Goals  Initiated 12/28/2017  1. Patient will move from supine to sit and sit to supine  and roll side to side in bed with independence within 7 day(s). 2.  Patient will transfer from bed to chair and chair to bed with modified independence using the least restrictive device within 7 day(s). 3.  Patient will perform sit to stand with modified independence within 7 day(s). 4.  Patient will ambulate with supervision/set-up for 50 feet with the least restrictive device within 7 day(s). physical Therapy EVALUATION  Patient: Tammy Mojica (58 y.o. male)  Date: 12/28/2017  Primary Diagnosis: Bilateral cellulitis of lower leg  HTN (hypertension)        Precautions:        ASSESSMENT :  Based on the objective data described below, the patient presents with frustration and increased pain in bilateral LEs that patient is reporting severely limits his functional mobility at this time. Patient sitting on edge of bed when PT entered room, and stated he hasn't had good luck recently. Pt originally stated he lived in his own home alone but then reports he and his wife  and he's staying on a friends property or in hotels. Also reports he is working as much as he can as the money he receives from disability isn't enough. Pt has a history of CVA with residual L sided deficits. L UE contracted with minimal motion from shoulder only. Assisted in placing rolled up gauze in L hand to avoid skin breakdown. L LE contracted at knee however pt reports he ambulates with a cane at baseline. Pt's L leg too painful for PROM to assess true ROM. When therapist asked patient to perform LAQ while seated EOB, he did so on R, but stated the L was paralyzed, despite therapist encouraging him that if he was able to walk, he must have some form of strength and motion.  Pt agreeable to attempt standing up on R LE with assist from therapists. With Mod A able to complete partial stand at EOB before uncontrollably sitting back down. Plan for MRI of L foot this afternoon. Pt is a high fall risk at this time, unable to ambulate and unsure if he is capable of taking care of his wounds at home. Due to these reasons feel pt would greatly benefit from rehab at discharge. Pending progress and plan with L LE if pain is better controlled and pt is able to ambulate short distances may be able to discharge home. Patient will benefit from skilled intervention to address the above impairments. Patients rehabilitation potential is considered to be Guarded  Factors which may influence rehabilitation potential include:   []         None noted  []         Mental ability/status  [x]         Medical condition  [x]         Home/family situation and support systems  [x]         Safety awareness  [x]         Pain tolerance/management  []         Other:      PLAN :  Recommendations and Planned Interventions:  [x]           Bed Mobility Training             [x]    Neuromuscular Re-Education  [x]           Transfer Training                   []    Orthotic/Prosthetic Training  [x]           Gait Training                         []    Modalities  [x]           Therapeutic Exercises           []    Edema Management/Control  [x]           Therapeutic Activities            [x]    Patient and Family Training/Education  []           Other (comment):    Frequency/Duration: Patient will be followed by physical therapy  4 times a week to address goals. Discharge Recommendations: Rehab  Further Equipment Recommendations for Discharge: TBD     SUBJECTIVE:   Patient stated My wife and I don't live together anymore and I have a 8year old daughter that lives with someone else.     OBJECTIVE DATA SUMMARY:   HISTORY:    Past Medical History:   Diagnosis Date    Aneurysm ([address]     (with stroke)    Gout     Hypercholesterolemia     Hypertension     Lesion of bladder     [facility]     Stroke [facility]) 2006    left sided weakness    Thromboembolus [facility]     Thyroid disease     TIA (transient ischemic attack) 2012     Past Surgical History:   Procedure Laterality Date    HX ORTHOPAEDIC      KNEE ARTHROSCP HARV      left knee     Prior Level of Function/Home Situation: Mod I? Reports he works for an [employer]. Personal factors and/or comorbidities impacting plan of care: CVA, seizures, HTN    Home Situation  Home Environment: Private residence  One/Two Story Residence: Other (Comment) (friends house or hotel)  Living Alone: Yes  Support Systems: Family member(s)  Patient Expects to be Discharged to[de-identified] Patient room  Current DME Used/Available at Home: Cane, straight    EXAMINATION/PRESENTATION/DECISION MAKING:   Critical Behavior:  Neurologic State: Alert  Orientation Level: Oriented X4  Cognition: Follows commands     Hearing: Auditory  Auditory Impairment: None    Range Of Motion:  AROM: Generally decreased, functional (L UE nonfuctional, L LE knee flexion contracture)           PROM: Generally decreased, functional           Strength:    Strength: Generally decreased, functional                    Tone & Sensation:   Tone: Abnormal (L UE/LE contractures)              Sensation: Impaired               Coordination:  Coordination: Generally decreased, functional  Vision:      Functional Mobility:  Bed Mobility:     Supine to Sit: Contact guard assistance  Sit to Supine: Minimum assistance     Transfers:  Sit to Stand: Moderate assistance;Assist x1;Additional time  Stand to Sit: Moderate assistance                       Balance:   Sitting: Intact  Standing: Impaired; With support  Standing - Static: Poor  Standing - Dynamic : Poor  Ambulation/Gait Training:                                           Functional Measure:  Tinetti test:    Sitting Balance: 1  Arises: 0  Attempts to Rise: 0  Immediate Standing Balance: 0  Standing Balance: 0  Nudged: 0  Eyes Closed: 0  Turn 360 Degrees - Continuous/Discontinuous: 0  Turn 360 Degrees - Steady/Unsteady: 0  Sitting Down: 0  Balance Score: 1  Indication of Gait: 0  R Step Length/Height: 0  L Step Length/Height: 0  R Foot Clearance: 0  L Foot Clearance: 0  Step Symmetry: 0  Step Continuity: 0  Path: 0  Trunk: 0  Walking Time: 0  Gait Score: 0  Total Score: 1       Tinetti Test and G-code impairment scale:  Percentage of Impairment CH    0%   CI    1-19% CJ    20-39% CK    40-59% CL    60-79% CM    80-99% CN     100%   Tinetti  Score 0-28 28 23-27 17-22 12-16 6-11 1-5 0       Tinetti Tool Score Risk of Falls  <19 = High Fall Risk  19-24 = Moderate Fall Risk  25-28 = Low Fall Risk  Tinetti ME. Performance-Oriented Assessment of Mobility Problems in Elderly Patients. [facility]; [id number]. (Scoring Description: PT Bulletin Feb. 10, 1993)    Older adults: Katie Perez et al, 2009; n = 1000 [facility] elderly evaluated with ABC, ABBE, ADL, and IADL)  · Mean ABBE score for males aged 69-68 years = 26.21(3.40)  · Mean ABBE score for females age 69-68 years = 25.16(4.30)  · Mean ABBE score for males over 80 years = 23.29(6.02)  · Mean ABBE score for females over 80 years = 17.20(8.32)         G codes: In compliance with CMSs Claims Based Outcome Reporting, the following G-code set was chosen for this patient based on their primary functional limitation being treated: The outcome measure chosen to determine the severity of the functional limitation was the Tinetti with a score of 1/28 which was correlated with the impairment scale.     ? Mobility - Walking and Moving Around:     - CURRENT STATUS: CM    - GOAL STATUS: CL - 60%-79% impaired, limited or restricted    - D/C STATUS:  ---------------To be determined---------------      Physical Therapy Evaluation Charge Determination   History Examination Presentation Decision-Making   HIGH Complexity :3+ comorbidities / personal factors will impact the outcome/ POC  MEDIUM Complexity : 3 Standardized tests and measures addressing body structure, function, activity limitation and / or participation in recreation  LOW Complexity : Stable, uncomplicated  LOW Complexity : FOTO score of       Based on the above components, the patient evaluation is determined to be of the following complexity level: LOW     Pain:  Pain Scale 1: Numeric (0 - 10)  Pain Intensity 1: 7  Pain Location 1: Foot  Pain Orientation 1: Left  Pain Description 1: Aching  Pain Intervention(s) 1: Medication (see MAR)  Activity Tolerance:   Fair  Please refer to the flowsheet for vital signs taken during this treatment. After treatment:   []         Patient left in no apparent distress sitting up in chair  [x]         Patient left in no apparent distress in bed  [x]         Call bell left within reach  [x]         Nursing notified  []         Caregiver present  []         Bed alarm activated    COMMUNICATION/EDUCATION:   The patients plan of care was discussed with: Registered Nurse. [x]         Fall prevention education was provided and the patient/caregiver indicated understanding. [x]         Patient/family have participated as able in goal setting and plan of care. [x]         Patient/family agree to work toward stated goals and plan of care. []         Patient understands intent and goals of therapy, but is neutral about his/her participation. []         Patient is unable to participate in goal setting and plan of care.     Thank you for this referral.  Eusebio Meyer, PT   Time Calculation: 33 mins

## 2017-12-28 NOTE — PROGRESS NOTES
Bedside and Verbal shift change report given to [facility] (oncoming nurse) by Rahel Abel RN (offgoing nurse). Report included the following information SBAR, Intake/Output and Recent Results.

## 2017-12-28 NOTE — PROGRESS NOTES
Physical Therapy  12/28/2017    1115: Pt seen and evaluated, full note to follow. Pt with limited mobility due to pain in bilateral LEs and residual deficits from CVA. At this time pt is a high fall risk, unable to ambulate or transfer and currently recommending rehab at discharge pending progress. Awaiting MRI of L foot.     Thank Diamond Noel, PT, DPT

## 2017-12-28 NOTE — INTERDISCIPLINARY ROUNDS
Bedside interdisciplinary rounds were held today to discuss patient plan of care and outcomes. The following members were present: [address] [place] [address], Pharmacy, Physical Therapy, and Case Management. Plan:  Patient for OR tomorrow evening. Patient c/o pain during rounds but closes eyes as if drifting to sleep. Requesting additional pain medication - deferred to attending.

## 2017-12-28 NOTE — PROGRESS NOTES
Bedside shift change report given to Iban (oncoming nurse) by Richie Snider (offgoing nurse). Report included the following information SBAR, Kardex, Intake/Output, MAR, Accordion and Recent Results.

## 2017-12-28 NOTE — ROUTINE PROCESS
In patient brought to the xray recovery area via stretcher, a/o x 3 here today for an MRI with sedation.

## 2017-12-28 NOTE — PROGRESS NOTES
Returned to xray recovery area post MRI sedation. A/O x 3 w/o complaint. Able to sit up in bed and drink a soda and eat crackers. Urinated 300 ml clear yellow.

## 2017-12-28 NOTE — PROGRESS NOTES
MRI report reviewed. No evidence of Osteomyelitis  Pt may be discharged on Cipro 750 mg po bid, Augmentin 875 mg bid x 10 days. Out patient Wound care.     Kedar Clark MD [place]

## 2017-12-28 NOTE — ROUTINE PROCESS
Transported back to room via stretcher, A/O x 3 w/o complaint. Bedside report given to [facility]. SBAR items covered.

## 2017-12-28 NOTE — PROGRESS NOTES
Occupational Therapy  Will defer initiating OT Evaluation as pt is off the floor for MRI with sedation. Will continue to follow.

## 2017-12-28 NOTE — PROGRESS NOTES
Hospitalist Progress Note    NAME: Keyshawn Light   :  1965   MRN:  [id number]     Presented to ED with worsening L LE swelling/pain. Recent admission at [facility] [place].   Attempted to get record from [facility] nut medical record was closed on weekend. Assessment / Plan:  Bilateral LE cellulitis POA with purulent drainage  Chronic non healing L Foot ulcer/wound with cellulitis POA  - R LE swelling/erythema- much better; Leukocytosis is down. L foot swelling better   Continue to report that pain is not controlled thou looks comfortable   Baseline: chronic left foot wound. Left knee contracture and swelling lower leg related to previous stroke. -AB: zosyn day 8 ( ), vanco dc   BC NTD, wound cultures: pseudomonas S zosyn     ID help appreciated   -MRI - unable to complete despite 3 attempts ( per pt due to pain/claustrophobia thou MRI is for foot not head)  Attempting MRI with conscious sedation today   --pain management: appreciated palliative care team greatly  1. Pain level should be based on observation, not patient rating. 2. Increase the Methadone to 80mg/day. 3. Increase the Dilaudid to 8mg PO q. 4 hours prn.  4. Discontinue the IV Dilaudid. 5. When pt is ready for discharge, do not provide any opioid prescriptions. The methadone will prevent withdrawal symptoms when the dilaudid is stopped. 6. Pt will need to follow-up with [facility]. Primary pain specialist: Dr. Angelita Mahan  --vascular surgery help was GREATLY appreciated:   Pt seen twice by Dr Ciro Pierce  and as second opinion Dr Ita Duke   Both feel that patient has adequate arterial profusion to undergo debridement of tissue and possibly bone left foot.   --podiatry help was appreciated:  Dr Imelda Pacheco planning debridement    -LE dopplers negative for DVT     Essential HTN POA/ sinus bradycardia   -BP 130s, stable   -HR low to mid 50s - can be related to methadone / dilaudid use --> need to be watched closely ; asymptomatic at this time  --monitor on tele   -increased home lisinopril this admission, watch cr/K with it     Bladder mass   --Per pt he was [facility] with a bladder [facility] per pt  --[facility]   --follow with urology OP as planned previously ( per pt he has appointment)     Left Hemiparesis from Old CVA POA  Chronic Pain syndrome on maintenance Methadone at [address] [place] [address] 65 mg daily   Hyperlipidemia, not on meds at home   Gout arthritis, cont allopurinol               Code Status: Full  Surrogate Decision Maker: sister Citlalli Angry # [phone]  DVT Prophylaxis: SQ Lovenox    Baseline: lives alone, ambulating with cane  Body mass index is 27.44 kg/(m^2). Recommended Disposition: TBD     Subjective:     Chief Complaint / Reason for Physician Visit: left foot cellulitis / HTN   Pain: pt always complain that pt is not controlled     Discussed with RN events overnight. Review of Systems:  Symptom Y/N Comments  Symptom Y/N Comments   Fever/Chills n   Chest Pain n    Poor Appetite    Edema     Cough    Abdominal Pain n    Sputum    Joint Pain     SOB/BECK n   Pruritis/Rash     Nausea/vomit n   Tolerating PT/OT     Diarrhea n   Tolerating Diet     Constipation n   Other       Could NOT obtain due to:      Objective:     VITALS:   Last 24hrs VS reviewed since prior progress note.  Most recent are:  Patient Vitals for the past 24 hrs:   Temp Pulse Resp BP SpO2   12/28/17 1455 - (!) 53 - 135/89 98 %   12/28/17 1450 - (!) 54 12 179/80 98 %   12/28/17 1445 - (!) 53 14 161/77 98 %   12/28/17 1440 - (!) 54 12 137/84 97 %   12/28/17 1435 - (!) 54 - 137/78 97 %   12/28/17 1430 - (!) 55 - - 98 %   12/28/17 1425 - (!) 54 - 133/87 98 %   12/28/17 1420 - (!) 53 - 137/88 98 %   12/28/17 1415 - (!) 54 14 147/86 97 %   12/28/17 1410 - (!) 55 16 160/90 98 %   12/28/17 1346 - 61 16 120/69 97 %   12/28/17 0647 98.1 °F (36.7 °C) (!) 50 17 134/65 98 % 12/28/17 0217 98.4 °F (36.9 °C) (!) 50 18 117/68 99 %   12/27/17 2158 98.2 °F (36.8 °C) (!) 54 18 (!) 159/95 98 %   12/27/17 1850 - 65 18 132/86 96 %       Intake/Output Summary (Last 24 hours) at 12/28/17 1504  Last data filed at 12/28/17 0646   Gross per 24 hour   Intake              450 ml   Output             2000 ml   Net            -1550 ml        PHYSICAL EXAM:  General: WD, WN. Alert, cooperative, no acute distress    EENT:  EOMI. Anicteric sclerae. MMM  Resp:  CTA bilaterally, no wheezing or rales. No accessory muscle use  CV:  Regular  rhythm,  R LE swelling/erythema - resolved; L foot swelling better     GI:  Soft, Non distended, Non tender.  +Bowel sounds  Neurologic:  Alert and oriented X 3, normal speech,   Psych:   Good insight. Not anxious nor agitated  Skin:  No rashes. No jaundice                                                                        12/22 12/26              Reviewed most current lab test results and cultures  YES  Reviewed most current radiology test results   YES  Review and summation of old records today    NO  Reviewed patient's current orders and MAR    YES  PMH/SH reviewed - no change compared to H&P  ________________________________________________________________________  Care Plan discussed with:    Comments   Patient y    Family      RN y    Care Manager     Consultant  y palliative care team / ID                      Multidiciplinary team rounds were held today with , nursing, pharmacist and clinical coordinator. Patient's plan of care was discussed; medications were reviewed and discharge planning was addressed.      ________________________________________________________________________  Total NON critical care TIME:  35 Minutes    Total CRITICAL CARE TIME Spent:   Minutes non procedure based      Comments   >50% of visit spent in counseling and coordination of care ________________________________________________________________________  Heath Holder MD     Procedures: see electronic medical records for all procedures/Xrays and details which were not copied into this note but were reviewed prior to creation of Plan. LABS:  I reviewed today's most current labs and imaging studies.   Pertinent labs include:  Recent Labs      12/26/17 0429   WBC  9.2   HGB  11.3*   HCT  35.9*   PLT  249     Recent Labs      12/28/17 0425 12/27/17   0635  12/26/17 0429   NA  135*  138  136   K  4.1  4.1  4.0   CL  100  102  101   CO2  29  29  29   GLU  107*  99  98   BUN  13  10  10   CREA  0.64*  0.57*  0.63*   CA  8.6  8.8  8.7   MG   --    --   2.0   PHOS   --    --   3.6       Signed: Heath Holder MD

## 2017-12-29 ENCOUNTER — ANESTHESIA EVENT (OUTPATIENT)
Dept: SURGERY | Age: 52
DRG: 383 | End: 2017-12-29
Payer: MEDICAID

## 2017-12-29 ENCOUNTER — ANESTHESIA (OUTPATIENT)
Dept: SURGERY | Age: 52
DRG: 383 | End: 2017-12-29
Payer: MEDICAID

## 2017-12-29 LAB
ANION GAP SERPL CALC-SCNC: 6 MMOL/L (ref 5–15)
BUN SERPL-MCNC: 13 MG/DL (ref 6–20)
BUN/CREAT SERPL: 19 (ref 12–20)
CALCIUM SERPL-MCNC: 9 MG/DL (ref 8.5–10.1)
CHLORIDE SERPL-SCNC: 100 MMOL/L (ref 97–108)
CO2 SERPL-SCNC: 31 MMOL/L (ref 21–32)
CREAT SERPL-MCNC: 0.69 MG/DL (ref 0.7–1.3)
ERYTHROCYTE [DISTWIDTH] IN BLOOD BY AUTOMATED COUNT: 16.8 % (ref 11.5–14.5)
GLUCOSE SERPL-MCNC: 93 MG/DL (ref 65–100)
HCT VFR BLD AUTO: 36.9 % (ref 36.6–50.3)
HGB BLD-MCNC: 11.6 G/DL (ref 12.1–17)
MAGNESIUM SERPL-MCNC: 2.1 MG/DL (ref 1.6–2.4)
MCH RBC QN AUTO: 26.9 PG (ref 26–34)
MCHC RBC AUTO-ENTMCNC: 31.4 G/DL (ref 30–36.5)
MCV RBC AUTO: 85.4 FL (ref 80–99)
PHOSPHATE SERPL-MCNC: 4.7 MG/DL (ref 2.6–4.7)
PLATELET # BLD AUTO: 334 K/UL (ref 150–400)
POTASSIUM SERPL-SCNC: 4.1 MMOL/L (ref 3.5–5.1)
RBC # BLD AUTO: 4.32 M/UL (ref 4.1–5.7)
SODIUM SERPL-SCNC: 137 MMOL/L (ref 136–145)
WBC # BLD AUTO: 7.8 K/UL (ref 4.1–11.1)

## 2017-12-29 PROCEDURE — 74011250636 HC RX REV CODE- 250/636: Performed by: PODIATRIST

## 2017-12-29 PROCEDURE — 97165 OT EVAL LOW COMPLEX 30 MIN: CPT | Performed by: OCCUPATIONAL THERAPIST

## 2017-12-29 PROCEDURE — 77030019895 HC PCKNG STRP IODO -A: Performed by: PODIATRIST

## 2017-12-29 PROCEDURE — 77030011640 HC PAD GRND REM COVD -A: Performed by: PODIATRIST

## 2017-12-29 PROCEDURE — 76010000138 HC OR TIME 0.5 TO 1 HR: Performed by: PODIATRIST

## 2017-12-29 PROCEDURE — G8988 SELF CARE GOAL STATUS: HCPCS | Performed by: OCCUPATIONAL THERAPIST

## 2017-12-29 PROCEDURE — 83735 ASSAY OF MAGNESIUM: CPT | Performed by: HOSPITALIST

## 2017-12-29 PROCEDURE — 36415 COLL VENOUS BLD VENIPUNCTURE: CPT | Performed by: HOSPITALIST

## 2017-12-29 PROCEDURE — 87186 SC STD MICRODIL/AGAR DIL: CPT | Performed by: HOSPITALIST

## 2017-12-29 PROCEDURE — 74011250637 HC RX REV CODE- 250/637: Performed by: INTERNAL MEDICINE

## 2017-12-29 PROCEDURE — 74011250636 HC RX REV CODE- 250/636: Performed by: ANESTHESIOLOGY

## 2017-12-29 PROCEDURE — 74011000258 HC RX REV CODE- 258: Performed by: INTERNAL MEDICINE

## 2017-12-29 PROCEDURE — 74011250636 HC RX REV CODE- 250/636: Performed by: INTERNAL MEDICINE

## 2017-12-29 PROCEDURE — 87075 CULTR BACTERIA EXCEPT BLOOD: CPT | Performed by: HOSPITALIST

## 2017-12-29 PROCEDURE — 76060000032 HC ANESTHESIA 0.5 TO 1 HR: Performed by: PODIATRIST

## 2017-12-29 PROCEDURE — 74011250636 HC RX REV CODE- 250/636

## 2017-12-29 PROCEDURE — 77030018836 HC SOL IRR NACL ICUM -A: Performed by: PODIATRIST

## 2017-12-29 PROCEDURE — 74011000250 HC RX REV CODE- 250: Performed by: PODIATRIST

## 2017-12-29 PROCEDURE — 87077 CULTURE AEROBIC IDENTIFY: CPT | Performed by: HOSPITALIST

## 2017-12-29 PROCEDURE — 85027 COMPLETE CBC AUTOMATED: CPT | Performed by: HOSPITALIST

## 2017-12-29 PROCEDURE — 74011250637 HC RX REV CODE- 250/637: Performed by: HOSPITALIST

## 2017-12-29 PROCEDURE — 74011250637 HC RX REV CODE- 250/637: Performed by: NURSE PRACTITIONER

## 2017-12-29 PROCEDURE — 87205 SMEAR GRAM STAIN: CPT | Performed by: HOSPITALIST

## 2017-12-29 PROCEDURE — 84100 ASSAY OF PHOSPHORUS: CPT | Performed by: HOSPITALIST

## 2017-12-29 PROCEDURE — G8987 SELF CARE CURRENT STATUS: HCPCS | Performed by: OCCUPATIONAL THERAPIST

## 2017-12-29 PROCEDURE — 0JBR0ZZ EXCISION OF LEFT FOOT SUBCUTANEOUS TISSUE AND FASCIA, OPEN APPROACH: ICD-10-PCS | Performed by: PODIATRIST

## 2017-12-29 PROCEDURE — 77030020782 HC GWN BAIR PAWS FLX 3M -B

## 2017-12-29 PROCEDURE — 80048 BASIC METABOLIC PNL TOTAL CA: CPT | Performed by: HOSPITALIST

## 2017-12-29 PROCEDURE — 65660000000 HC RM CCU STEPDOWN

## 2017-12-29 PROCEDURE — 76210000016 HC OR PH I REC 1 TO 1.5 HR: Performed by: PODIATRIST

## 2017-12-29 RX ORDER — ONDANSETRON 2 MG/ML
4 INJECTION INTRAMUSCULAR; INTRAVENOUS AS NEEDED
Status: DISCONTINUED | OUTPATIENT
Start: 2017-12-29 | End: 2017-12-29 | Stop reason: HOSPADM

## 2017-12-29 RX ORDER — MIDAZOLAM HYDROCHLORIDE 1 MG/ML
INJECTION, SOLUTION INTRAMUSCULAR; INTRAVENOUS AS NEEDED
Status: DISCONTINUED | OUTPATIENT
Start: 2017-12-29 | End: 2017-12-29 | Stop reason: HOSPADM

## 2017-12-29 RX ORDER — PROPOFOL 10 MG/ML
INJECTION, EMULSION INTRAVENOUS
Status: DISCONTINUED | OUTPATIENT
Start: 2017-12-29 | End: 2017-12-29 | Stop reason: HOSPADM

## 2017-12-29 RX ORDER — SODIUM CHLORIDE 0.9 % (FLUSH) 0.9 %
5-10 SYRINGE (ML) INJECTION EVERY 8 HOURS
Status: DISCONTINUED | OUTPATIENT
Start: 2017-12-29 | End: 2017-12-29 | Stop reason: HOSPADM

## 2017-12-29 RX ORDER — SODIUM CHLORIDE, SODIUM LACTATE, POTASSIUM CHLORIDE, CALCIUM CHLORIDE 600; 310; 30; 20 MG/100ML; MG/100ML; MG/100ML; MG/100ML
25 INJECTION, SOLUTION INTRAVENOUS CONTINUOUS
Status: DISCONTINUED | OUTPATIENT
Start: 2017-12-29 | End: 2017-12-29 | Stop reason: HOSPADM

## 2017-12-29 RX ORDER — HYDROMORPHONE HYDROCHLORIDE 2 MG/ML
INJECTION, SOLUTION INTRAMUSCULAR; INTRAVENOUS; SUBCUTANEOUS
Status: COMPLETED
Start: 2017-12-29 | End: 2017-12-29

## 2017-12-29 RX ORDER — HYDROMORPHONE HYDROCHLORIDE 1 MG/ML
.2-.5 INJECTION, SOLUTION INTRAMUSCULAR; INTRAVENOUS; SUBCUTANEOUS
Status: DISCONTINUED | OUTPATIENT
Start: 2017-12-29 | End: 2017-12-29 | Stop reason: HOSPADM

## 2017-12-29 RX ORDER — SODIUM CHLORIDE 0.9 % (FLUSH) 0.9 %
5-10 SYRINGE (ML) INJECTION AS NEEDED
Status: DISCONTINUED | OUTPATIENT
Start: 2017-12-29 | End: 2017-12-29 | Stop reason: HOSPADM

## 2017-12-29 RX ORDER — DIPHENHYDRAMINE HYDROCHLORIDE 50 MG/ML
12.5 INJECTION, SOLUTION INTRAMUSCULAR; INTRAVENOUS AS NEEDED
Status: DISCONTINUED | OUTPATIENT
Start: 2017-12-29 | End: 2017-12-29 | Stop reason: HOSPADM

## 2017-12-29 RX ORDER — BUPIVACAINE HYDROCHLORIDE AND EPINEPHRINE 2.5; 5 MG/ML; UG/ML
INJECTION, SOLUTION EPIDURAL; INFILTRATION; INTRACAUDAL; PERINEURAL AS NEEDED
Status: DISCONTINUED | OUTPATIENT
Start: 2017-12-29 | End: 2017-12-29 | Stop reason: HOSPADM

## 2017-12-29 RX ORDER — MORPHINE SULFATE 10 MG/ML
2 INJECTION, SOLUTION INTRAMUSCULAR; INTRAVENOUS
Status: DISCONTINUED | OUTPATIENT
Start: 2017-12-29 | End: 2017-12-29 | Stop reason: HOSPADM

## 2017-12-29 RX ORDER — FENTANYL CITRATE 50 UG/ML
25 INJECTION, SOLUTION INTRAMUSCULAR; INTRAVENOUS
Status: DISCONTINUED | OUTPATIENT
Start: 2017-12-29 | End: 2017-12-29 | Stop reason: HOSPADM

## 2017-12-29 RX ORDER — FENTANYL CITRATE 50 UG/ML
INJECTION, SOLUTION INTRAMUSCULAR; INTRAVENOUS AS NEEDED
Status: DISCONTINUED | OUTPATIENT
Start: 2017-12-29 | End: 2017-12-29 | Stop reason: HOSPADM

## 2017-12-29 RX ADMIN — HYDROMORPHONE HYDROCHLORIDE 8 MG: 2 TABLET ORAL at 08:57

## 2017-12-29 RX ADMIN — SODIUM CHLORIDE: 900 INJECTION, SOLUTION INTRAVENOUS at 22:30

## 2017-12-29 RX ADMIN — FENTANYL CITRATE 25 MCG: 50 INJECTION, SOLUTION INTRAMUSCULAR; INTRAVENOUS at 19:18

## 2017-12-29 RX ADMIN — HYDROMORPHONE HYDROCHLORIDE 8 MG: 2 TABLET ORAL at 16:44

## 2017-12-29 RX ADMIN — PROPOFOL 75 MCG/KG/MIN: 10 INJECTION, EMULSION INTRAVENOUS at 18:12

## 2017-12-29 RX ADMIN — Medication 10 ML: at 11:19

## 2017-12-29 RX ADMIN — METHADONE HYDROCHLORIDE 80 MG: 10 TABLET ORAL at 10:36

## 2017-12-29 RX ADMIN — Medication 10 ML: at 22:23

## 2017-12-29 RX ADMIN — HYDROMORPHONE HYDROCHLORIDE 0.5 MG: 1 INJECTION, SOLUTION INTRAMUSCULAR; INTRAVENOUS; SUBCUTANEOUS at 19:45

## 2017-12-29 RX ADMIN — FAMOTIDINE 20 MG: 20 TABLET ORAL at 16:43

## 2017-12-29 RX ADMIN — COLCHICINE 0.6 MG: 0.6 TABLET, FILM COATED ORAL at 16:43

## 2017-12-29 RX ADMIN — DOCUSATE SODIUM 100 MG: 100 CAPSULE, LIQUID FILLED ORAL at 16:44

## 2017-12-29 RX ADMIN — SODIUM CHLORIDE: 900 INJECTION, SOLUTION INTRAVENOUS at 11:19

## 2017-12-29 RX ADMIN — FENTANYL CITRATE 25 MCG: 50 INJECTION, SOLUTION INTRAMUSCULAR; INTRAVENOUS at 19:09

## 2017-12-29 RX ADMIN — Medication 10 ML: at 06:29

## 2017-12-29 RX ADMIN — COLCHICINE 0.6 MG: 0.6 TABLET, FILM COATED ORAL at 09:04

## 2017-12-29 RX ADMIN — FENTANYL CITRATE 50 MCG: 50 INJECTION, SOLUTION INTRAMUSCULAR; INTRAVENOUS at 18:06

## 2017-12-29 RX ADMIN — HYDROMORPHONE HYDROCHLORIDE 8 MG: 2 TABLET ORAL at 12:55

## 2017-12-29 RX ADMIN — HYDROMORPHONE HYDROCHLORIDE 0.5 MG: 1 INJECTION, SOLUTION INTRAMUSCULAR; INTRAVENOUS; SUBCUTANEOUS at 19:30

## 2017-12-29 RX ADMIN — FENTANYL CITRATE 25 MCG: 50 INJECTION, SOLUTION INTRAMUSCULAR; INTRAVENOUS at 19:12

## 2017-12-29 RX ADMIN — Medication 1 CAPSULE: at 08:58

## 2017-12-29 RX ADMIN — SODIUM CHLORIDE: 900 INJECTION, SOLUTION INTRAVENOUS at 03:48

## 2017-12-29 RX ADMIN — DOCUSATE SODIUM 100 MG: 100 CAPSULE, LIQUID FILLED ORAL at 08:58

## 2017-12-29 RX ADMIN — HYDROMORPHONE HYDROCHLORIDE 8 MG: 2 TABLET ORAL at 22:22

## 2017-12-29 RX ADMIN — LEVOTHYROXINE SODIUM 75 MCG: 75 TABLET ORAL at 08:57

## 2017-12-29 RX ADMIN — FENTANYL CITRATE 25 MCG: 50 INJECTION, SOLUTION INTRAMUSCULAR; INTRAVENOUS at 19:15

## 2017-12-29 RX ADMIN — LISINOPRIL 40 MG: 20 TABLET ORAL at 08:57

## 2017-12-29 RX ADMIN — FENTANYL CITRATE 25 MCG: 50 INJECTION, SOLUTION INTRAMUSCULAR; INTRAVENOUS at 18:15

## 2017-12-29 RX ADMIN — HYDROMORPHONE HYDROCHLORIDE 8 MG: 2 TABLET ORAL at 03:47

## 2017-12-29 RX ADMIN — FAMOTIDINE 20 MG: 20 TABLET ORAL at 08:57

## 2017-12-29 RX ADMIN — SODIUM CHLORIDE, POTASSIUM CHLORIDE, SODIUM LACTATE AND CALCIUM CHLORIDE: 600; 310; 30; 20 INJECTION, SOLUTION INTRAVENOUS at 17:20

## 2017-12-29 RX ADMIN — MIDAZOLAM HYDROCHLORIDE 2 MG: 1 INJECTION, SOLUTION INTRAMUSCULAR; INTRAVENOUS at 18:02

## 2017-12-29 NOTE — ANESTHESIA PREPROCEDURE EVALUATION
Anesthetic History   No history of anesthetic complications            Review of Systems / Medical History  Patient summary reviewed, nursing notes reviewed and pertinent labs reviewed    Pulmonary  Within defined limits        Smoker         Neuro/Psych   Within defined limits  seizures  CVA (left sided weakness)  TIA and psychiatric history     Cardiovascular  Within defined limits  Hypertension              Exercise tolerance: <4 METS     GI/Hepatic/Renal  Within defined limits              Endo/Other  Within defined limits    Hypothyroidism       Other Findings   Comments:  Thromboembolism (HCC)      Cerebral hemorrhage with hemiparesis     Gout             Physical Exam    Airway  Mallampati: III  TM Distance: 4 - 6 cm  Neck ROM: normal range of motion   Mouth opening: Normal     Cardiovascular  Regular rate and rhythm,  S1 and S2 normal,  no murmur, click, rub, or gallop             Dental  No notable dental hx       Pulmonary  Breath sounds clear to auscultation               Abdominal  GI exam deferred       Other Findings            Anesthetic Plan    ASA: 3  Anesthesia type: MAC            Anesthetic plan and risks discussed with: Patient

## 2017-12-29 NOTE — PROGRESS NOTES
Hospitalist Progress Note    NAME: Shante Zeng   :  1965   MRN:  [id number]     Presented to ED with worsening L LE swelling/pain. Recent admission at [facility] [place].   Attempted to get record from [facility] nut medical record was closed on weekend. Assessment / Plan:  Bilateral LE cellulitis POA with purulent drainage  Chronic non healing L Foot ulcer/wound with cellulitis POA  - R LE swelling/erythema- much better; Leukocytosis is down. L foot swelling better   Continue to report that pain is not controlled thou looks comfortable   Baseline: chronic left foot wound. Left knee contracture and swelling lower leg related to previous stroke. -AB: zosyn day 9 ( ), vanco dc   BC NTD, wound cultures: pseudomonas S zosyn     ID help appreciated: can be dc on Cipro 750 mg po bid, Augmentin 875 mg bid x 10 days. -MRI - unable to complete initially despite 3 attempts ( per pt due to pain/claustrophobia thou MRI is for foot not head)   MRI with conscious sedation today :No fluid collection/ osteomyelitis  --pain management: appreciated palliative care team greatly  1. Pain level should be based on observation, not patient rating. 2. Increase the Methadone to 80mg/day. 3. Increase the Dilaudid to 8mg PO q. 4 hours prn.  4. Discontinue the IV Dilaudid. 5. When pt is ready for discharge, do not provide any opioid prescriptions. The methadone will prevent withdrawal symptoms when the dilaudid is stopped. 6. Pt will need to follow-up with [facility]. Primary pain specialist: Dr. Rebeca Klein will be seeing prn   --vascular surgery help was GREATLY appreciated:   Pt seen twice by Dr Andi Cardenas  and as second opinion Dr Sherman Marsh   Both feel that patient has adequate arterial profusion to undergo debridement of tissue and possibly bone left foot.   --podiatry help was appreciated:  Dr Viktor Duarte planning debridement    -LE dopplers negative for DVT     Essential HTN POA/ sinus bradycardia   -BP 130s, stable   -HR low to mid 50s - can be related to methadone / dilaudid use --> need to be watched closely ; asymptomatic at this time  --monitor on tele   -increased home lisinopril this admission, watch cr/K with it     Bladder mass   --Per pt he was diagnosed with a bladder mass at [facility] per pt  --still did not get record from [place] [id number]   --follow with urology OP as planned previously ( per pt he has appointment)     Left Hemiparesis from Old CVA POA  Chronic Pain syndrome on maintenance Methadone at [address] [place] [address] 65 mg daily   Hyperlipidemia, not on meds at home   Gout arthritis, cont allopurinol               Code Status: Full  Surrogate Decision Maker: sister Chava Tello # [phone]  DVT Prophylaxis: SQ Lovenox    Baseline: lives alone, ambulating with cane  Body mass index is 27.44 kg/(m^2). Recommended Disposition:   D/w pt dc planning today: awaiting debridement today then once OK with podiatry can be dc   PT: rehab, SW consulted      Subjective:     Chief Complaint / Reason for Physician Visit: left foot cellulitis / HTN   Awaiting surgery today       Discussed with RN events overnight. Review of Systems:  Symptom Y/N Comments  Symptom Y/N Comments   Fever/Chills n   Chest Pain n    Poor Appetite    Edema     Cough    Abdominal Pain n    Sputum    Joint Pain     SOB/BECK n   Pruritis/Rash     Nausea/vomit n   Tolerating PT/OT     Diarrhea n   Tolerating Diet     Constipation n   Other       Could NOT obtain due to:      Objective:     VITALS:   Last 24hrs VS reviewed since prior progress note.  Most recent are:  Patient Vitals for the past 24 hrs:   Temp Pulse Resp BP SpO2   12/29/17 1407 98.5 °F (36.9 °C) 82 17 135/89 96 %   12/29/17 1005 98.2 °F (36.8 °C) 67 17 91/74 94 %   12/29/17 0308 98.3 °F (36.8 °C) (!) 57 18 120/88 97 %   12/28/17 2221 97.9 °F (36.6 °C) 77 18 159/63 94 %   12/28/17 2215 98 °F (36.7 °C) 62 18 (!) 139/99 96 %   12/28/17 1844 97.9 °F (36.6 °C) (!) 57 16 134/80 98 %       Intake/Output Summary (Last 24 hours) at 12/29/17 1655  Last data filed at 12/29/17 1512   Gross per 24 hour   Intake              480 ml   Output             1940 ml   Net            -1460 ml        PHYSICAL EXAM:  General: WD, WN. Alert, cooperative, no acute distress    EENT:  EOMI. Anicteric sclerae. MMM  Resp:  CTA bilaterally, no wheezing or rales. No accessory muscle use  CV:  Regular  rhythm,  R LE swelling/erythema - resolved; L foot swelling better     GI:  Soft, Non distended, Non tender.  +Bowel sounds  Neurologic:  Alert and oriented X 3, normal speech,   Psych:   Good insight. Not anxious nor agitated  Skin:  No rashes. No jaundice                                                                        12/22 12/26              Reviewed most current lab test results and cultures  YES  Reviewed most current radiology test results   YES  Review and summation of old records today    NO  Reviewed patient's current orders and MAR    YES  PMH/ reviewed - no change compared to H&P  ________________________________________________________________________  Care Plan discussed with:    Comments   Patient y    Family      RN y    Care Manager     Consultant                        Multidiciplinary team rounds were held today with , nursing, pharmacist and clinical coordinator. Patient's plan of care was discussed; medications were reviewed and discharge planning was addressed.      ________________________________________________________________________  Total NON critical care TIME:  35 Minutes    Total CRITICAL CARE TIME Spent:   Minutes non procedure based      Comments   >50% of visit spent in counseling and coordination of care     ________________________________________________________________________  Lobo Pyle MD     Procedures: see electronic medical records for all procedures/Xrays and details which were not copied into this note but were reviewed prior to creation of Plan. LABS:  I reviewed today's most current labs and imaging studies.   Pertinent labs include:  Recent Labs      12/29/17 0359   WBC  7.8   HGB  11.6*   HCT  36.9   PLT  334     Recent Labs      12/29/17 0359 12/28/17   0425  12/27/17   0635   NA  137  135*  138   K  4.1  4.1  4.1   CL  100  100  102   CO2  31  29  29   GLU  93  107*  99   BUN  13  13  10   CREA  0.69*  0.64*  0.57*   CA  9.0  8.6  8.8   MG  2.1   --    --    PHOS  4.7   --    --        Signed: Erika Roman MD

## 2017-12-29 NOTE — PERIOP NOTES
TRANSFER - IN REPORT:    Verbal report received from YOU [facility] on Keyshawn Nadege  being received from  for ordered procedure      Report consisted of patients Situation, Background, Assessment and   Recommendations(SBAR). Information from the following report(s) SBAR, ED Summary, Intake/Output and MAR was reviewed with the receiving nurse. Opportunity for questions and clarification was provided. Assessment completed upon patients arrival to unit and care assumed.

## 2017-12-29 NOTE — PROGRESS NOTES
Bedside and Verbal shift change report given to [address] [place] [address] (oncoming nurse) by Ty Oglesby RN (offgoing nurse). Report included the following information SBAR, Kardex, Procedure Summary, Intake/Output, MAR and Recent Results.

## 2017-12-29 NOTE — INTERDISCIPLINARY ROUNDS
Bedside interdisciplinary rounds were held today to discuss patient plan of care and outcomes. The following members were present: [address] [place] [address], Pharmacy, Physical Therapy, and Case Management. Plan:  OR this evening.

## 2017-12-29 NOTE — PROGRESS NOTES
Podiatry Progress Note    Admit Date: 12/21/2017  Hospital day 8    Subjective:     Patient was admitted one day ago with chronic wound left foot. Patient states both legs are painful and wound left foot has been present for  more than one year. States he has been cleaning the foot with saline and applying gauze daily. MRI was performed shows no evidence of osteomyelitis nor abscess of the left foot. Patient has been assessed by Infectious Disease as well as vascular consult. Scheduled for debridement of necrotic tissue left foot.     Current Facility-Administered Medications   Medication Dose Route Frequency    lactated Ringers infusion  25 mL/hr IntraVENous CONTINUOUS    sodium chloride (NS) flush 5-10 mL  5-10 mL IntraVENous Q8H    sodium chloride (NS) flush 5-10 mL  5-10 mL IntraVENous PRN    lactated Ringers infusion  25 mL/hr IntraVENous CONTINUOUS    sodium chloride (NS) flush 5-10 mL  5-10 mL IntraVENous PRN    fentaNYL citrate (PF) injection 25 mcg  25 mcg IntraVENous Multiple    morphine injection 2 mg  2 mg IntraVENous Multiple    HYDROmorphone (PF) (DILAUDID) injection 0.2-0.5 mg  0.2-0.5 mg IntraVENous Multiple    ondansetron (ZOFRAN) injection 4 mg  4 mg IntraVENous PRN    diphenhydrAMINE (BENADRYL) injection 12.5 mg  12.5 mg IntraVENous PRN    meperidine (DEMEROL) injection 12.5 mg  12.5 mg IntraVENous PRN    bacitracin 150,000 Units in 0.9% sodium chloride 3,000 mL Irrigation    PRN    methadone (DOLOPHINE) tablet 80 mg  80 mg Oral DAILY    HYDROmorphone (DILAUDID) tablet 8 mg  8 mg Oral Q4H PRN    lisinopril (PRINIVIL, ZESTRIL) tablet 40 mg  40 mg Oral DAILY    docusate sodium (COLACE) capsule 100 mg  100 mg Oral BID    polyethylene glycol (MIRALAX) packet 17 g  17 g Oral DAILY    colchicine tablet 0.6 mg  0.6 mg Oral BID    allopurinol (ZYLOPRIM) tablet 100 mg  100 mg Oral DAILY    lactobac ac& pc-s.therm-b.anim (YULIA Q/RISAQUAD)  1 Cap Oral DAILY    famotidine (PEPCID) tablet 20 mg  20 mg Oral BID    aspirin chewable tablet 81 mg  81 mg Oral DAILY    levothyroxine (SYNTHROID) tablet 75 mcg  75 mcg Oral ACB    sodium chloride (NS) flush 5-10 mL  5-10 mL IntraVENous Q8H    sodium chloride (NS) flush 5-10 mL  5-10 mL IntraVENous PRN    naloxone (NARCAN) injection 0.4 mg  0.4 mg IntraVENous PRN    ondansetron (ZOFRAN) injection 4 mg  4 mg IntraVENous Q4H PRN    enoxaparin (LOVENOX) injection 40 mg  40 mg SubCUTAneous Q24H    piperacillin-tazobactam 3.375 g in 0.9% sodium chloride 100 mL IVPB   IntraVENous Q8H     Facility-Administered Medications Ordered in Other Encounters   Medication Dose Route Frequency    midazolam (VERSED) injection   IntraVENous PRN    fentaNYL citrate (PF) injection    PRN    propofol (DIPRIVAN) 10 mg/mL injection   IntraVENous CONTINUOUS        Review of Systems  See admitting H&P    Objective:     Patient Vitals for the past 8 hrs:   BP Temp Pulse Resp SpO2   12/29/17 1713 133/66 98.2 °F (36.8 °C) (!) 51 18 95 %   12/29/17 1407 135/89 98.5 °F (36.9 °C) 82 17 96 %     12/29 0701 - 12/29 1900  In: 0   Out: 850 [Urine:850]  12/27 1901 - 12/29 0700  In: 480 [P.O.:480]  Out: 3440 [Urine:2890]    Physical Exam: LOWER LEGS  Non palpable pedal pulses   Lower legs are erythematous , edematous and painful  Dorsal base of toes 1,2,3,left foot with draining wound that measures 3x2cm and with dried necrotic layer    EUGENE\"s suggest severe arterial disease    MRI left foot does not show any evidence of osteomyelitis nor of abscess leftfoot    Xray left foot does not show any signs of oste myelitis    All labs reviewed; WBC's 9.2                  Data Review   Recent Results (from the past 24 hour(s))   METABOLIC PANEL, BASIC    Collection Time: 12/29/17  3:59 AM   Result Value Ref Range    Sodium 137 136 - 145 mmol/L    Potassium 4.1 3.5 - 5.1 mmol/L    Chloride 100 97 - 108 mmol/L    CO2 31 21 - 32 mmol/L    Anion gap 6 5 - 15 mmol/L    Glucose 93 65 - 100 mg/dL BUN 13 6 - 20 MG/DL    Creatinine 0.69 (L) 0.70 - 1.30 MG/DL    BUN/Creatinine ratio 19 12 - 20      GFR est AA >60 >60 ml/min/1.73m2    GFR est non-AA >60 >60 ml/min/1.73m2    Calcium 9.0 8.5 - 10.1 MG/DL   CBC W/O DIFF    Collection Time: 12/29/17  3:59 AM   Result Value Ref Range    WBC 7.8 4.1 - 11.1 K/uL    RBC 4.32 4.10 - 5.70 M/uL    HGB 11.6 (L) 12.1 - 17.0 g/dL    HCT 36.9 36.6 - 50.3 %    MCV 85.4 80.0 - 99.0 FL    MCH 26.9 26.0 - 34.0 PG    MCHC 31.4 30.0 - 36.5 g/dL    RDW 16.8 (H) 11.5 - 14.5 %    PLATELET 149 933 - 538 K/uL   MAGNESIUM    Collection Time: 12/29/17  3:59 AM   Result Value Ref Range    Magnesium 2.1 1.6 - 2.4 mg/dL   PHOSPHORUS    Collection Time: 12/29/17  3:59 AM   Result Value Ref Range    Phosphorus 4.7 2.6 - 4.7 MG/DL           Assessment:     Principal Problem:    Bilateral cellulitis of lower leg (12/21/2017)    Active Problems:    Cerebral infarction ([address] (12/12/2012)      HTN (hypertension) (12/21/2017)      Long term current use of methadone for pain control (12/27/2017)      Major depressive disorder with current active episode (12/27/2017)      Physical debility (12/27/2017)        Plan:   Scheduled for debridement of necrotic tissue dorsal left foot. at 5pm.  Discussed procedure with patient as well as risks, benefits and expected outcome. All questions answered.

## 2017-12-29 NOTE — PROGRESS NOTES
Occupational Therapy  Attempting to initiate OT Evaluation, however, nursing reports that pt is awaiting medication; requesting to defer at this time. Will continue to follow.

## 2017-12-29 NOTE — PROGRESS NOTES
Dr. Willie Rosenberg notified to see if md would like for scheduled asprin and/or lovenox to be administered given pt is scheduled for I&D at 5pm today. Call back number [phone] given.

## 2017-12-29 NOTE — PROGRESS NOTES
Podiatry Progress Note    Admit Date: 12/21/2017  Hospital day 7    Subjective:     Patient was admitted one day ago with chronic wound left foot. Patient states both legs are painful and wound left foot has been present for  more than one year. States he has been cleaning the foot with saline and applying gauze daily. MRI was performed today. Patient has been assessed by Infectious Disease as well as vascular consult.     Current Facility-Administered Medications   Medication Dose Route Frequency    methadone (DOLOPHINE) tablet 80 mg  80 mg Oral DAILY    HYDROmorphone (DILAUDID) tablet 8 mg  8 mg Oral Q4H PRN    lisinopril (PRINIVIL, ZESTRIL) tablet 40 mg  40 mg Oral DAILY    docusate sodium (COLACE) capsule 100 mg  100 mg Oral BID    polyethylene glycol (MIRALAX) packet 17 g  17 g Oral DAILY    colchicine tablet 0.6 mg  0.6 mg Oral BID    allopurinol (ZYLOPRIM) tablet 100 mg  100 mg Oral DAILY    lactobac ac& pc-s.therm-b.anim (YULIA Q/RISAQUAD)  1 Cap Oral DAILY    famotidine (PEPCID) tablet 20 mg  20 mg Oral BID    aspirin chewable tablet 81 mg  81 mg Oral DAILY    levothyroxine (SYNTHROID) tablet 75 mcg  75 mcg Oral ACB    sodium chloride (NS) flush 5-10 mL  5-10 mL IntraVENous Q8H    sodium chloride (NS) flush 5-10 mL  5-10 mL IntraVENous PRN    naloxone (NARCAN) injection 0.4 mg  0.4 mg IntraVENous PRN    ondansetron (ZOFRAN) injection 4 mg  4 mg IntraVENous Q4H PRN    enoxaparin (LOVENOX) injection 40 mg  40 mg SubCUTAneous Q24H    piperacillin-tazobactam 3.375 g in 0.9% sodium chloride 100 mL IVPB   IntraVENous Q8H        Review of Systems  See admitting H&P    Objective:     Patient Vitals for the past 8 hrs:   BP Temp Pulse Resp SpO2   12/28/17 1844 134/80 97.9 °F (36.6 °C) (!) 57 16 98 %   12/28/17 1628 (!) 157/94 97.9 °F (36.6 °C) 62 14 96 %   12/28/17 1535 107/73 - (!) 53 14 98 %   12/28/17 1525 111/76 - (!) 56 16 98 %   12/28/17 1515 105/80 - (!) 57 16 98 %   12/28/17 1500 139/86 - (!) 55 - 98 %   12/28/17 1455 135/89 - (!) 53 - 98 %   12/28/17 1450 179/80 - (!) 54 12 98 %   12/28/17 1445 161/77 - (!) 53 14 98 %   12/28/17 1440 137/84 - (!) 54 12 97 %   12/28/17 1435 137/78 - (!) 54 - 97 %   12/28/17 1430 - - (!) 55 - 98 %   12/28/17 1425 133/87 - (!) 54 - 98 %   12/28/17 1420 137/88 - (!) 53 - 98 %   12/28/17 1415 147/86 - (!) 54 14 97 %   12/28/17 1410 160/90 - (!) 55 16 98 %   12/28/17 1346 120/69 - 61 16 97 %        12/27 0701 - 12/28 1900  In: 930 [P.O.:930]  Out: 4850 [Urine:4300]    Physical Exam: LOWER LEGS  Non palpable pedal pulses   Lower legs are erythematous , edematous and painful  Dorsal base of toes 1,2,3,left foot with draining wound that measures 3x2cm and with dried necrotic layer    EUGENE\"s suggest severe arterial disease    MRI left foot does not show any evidence of osteomyelitis nor of abscess leftfoot    Xray left foot does not show any signs of oste myelitis    All labs reviewed; WBC's 9.2                  Data Review   Recent Results (from the past 24 hour(s))   METABOLIC PANEL, BASIC    Collection Time: 12/28/17  4:25 AM   Result Value Ref Range    Sodium 135 (L) 136 - 145 mmol/L    Potassium 4.1 3.5 - 5.1 mmol/L    Chloride 100 97 - 108 mmol/L    CO2 29 21 - 32 mmol/L    Anion gap 6 5 - 15 mmol/L    Glucose 107 (H) 65 - 100 mg/dL    BUN 13 6 - 20 MG/DL    Creatinine 0.64 (L) 0.70 - 1.30 MG/DL    BUN/Creatinine ratio 20 12 - 20      GFR est AA >60 >60 ml/min/1.73m2    GFR est non-AA >60 >60 ml/min/1.73m2    Calcium 8.6 8.5 - 10.1 MG/DL           Assessment:     Principal Problem:    Bilateral cellulitis of lower leg (12/21/2017)    Active Problems:    Cerebral infarction ([address] (12/12/2012)      HTN (hypertension) (12/21/2017)      Long term current use of methadone for pain control (12/27/2017)      Major depressive disorder with current active episode (12/27/2017)      Physical debility (12/27/2017)        Plan:   Scheduled for debridement of necrotic tissue dorsal left foot tomorrow 12/29/17 at 5pm.  Discussed procedure with patient as well as risks, benefits and expected outcome. All questions answered.

## 2017-12-29 NOTE — PROGRESS NOTES
Problem: Self Care Deficits Care Plan (Adult)  Goal: *Acute Goals and Plan of Care (Insert Text)  Occupational Therapy Goals  Initiated 12/29/2017  1. Patient will perform bathing with minimal assistance/contact guard assist within 7 day(s). 2.  Patient will perform upper body dressing and lower body dressing with minimal assistance/contact guard assist within 7 day(s). 3.  Patient will perform L hand hygiene with supervision/set-up within 7 day(s). 4.  Patient will perform toilet transfers with moderate assistance  within 7 day(s). 5.  Patient will perform all aspects of toileting with minimal assistance/contact guard assist within 7 day(s). Occupational Therapy EVALUATION  Patient: Roland Morillo (89 y.o. male)  Date: 12/29/2017  Primary Diagnosis: Bilateral cellulitis of lower leg  HTN (hypertension)  necrotic tissue left foot  Procedure(s) (LRB):  INCISION AND DRAINAGE Left foot, pulsavac, would cultures (Left) Day of Surgery   Precautions: fall, contact       ASSESSMENT :  Based on the objective data described below, the patient presents with baseline L side hemiparesis due to old cva, impaired sensation (hypersensitivity), LUE contractures impairing function, history of  Major depressive disorder, chronic pain syndrome, decreased balance, generalized weakness, LLE hemiparesis and wound of > 1 year with chronic pain all impairing independence and safety during adls and mobility. Pt was attempting to open his L hand upon arrival and had requested tubing to provide space in palm to alleviate skin damage from fingernails. Pt was provided a red tube that was covered in moleskin and stockinette so that moisture from hand could be absorbed and decrease risk of skin breakdown. Pt reports a history of skin breakdown in L palm 8 months ago that was treated with medication.    Pt was preparing for I & D surgery on L foot upon arrival (bathing with wipes at bedside) and was assisted with functional transfer bed to [facility] with maximal assistance. Pt asked for privacy and session discontinued. May benefit from rehab at discharge. .    Patient will benefit from skilled intervention to address the above impairments. Patients rehabilitation potential is considered to be Fair  Factors which may influence rehabilitation potential include:   []             None noted  []             Mental ability/status  [x]             Medical condition  [x]             Home/family situation and support systems  []             Safety awareness  [x]             Pain tolerance/management  []             Other: hypersensivity L side     PLAN :  Recommendations and Planned Interventions:  [x]               Self Care Training                  [x]        Therapeutic Activities  [x]               Functional Mobility Training    []        Cognitive Retraining  [x]               Therapeutic Exercises           [x]        Endurance Activities  [x]               Balance Training                   [x]        Neuromuscular Re-Education  []               Visual/Perceptual Training     [x]   Home Safety Training  [x]               Patient Education                 [x]        Family Training/Education  []               Other (comment):    Frequency/Duration: Patient will be followed by occupational therapy 3 times a week to address goals. Discharge Recommendations: Rehab  tbd  Further Equipment Recommendations for Discharge: tbd     SUBJECTIVE:   Patient stated Marija Failing like some privacy please.   (pt readying himself for surgery)    OBJECTIVE DATA SUMMARY:   HISTORY:   Past Medical History:   Diagnosis Date    Aneurysm ([facility] [address]     (with stroke)    Gout     Hypercholesterolemia     Hypertension     Lesion of bladder     Seizures ([facility] [address]     Stroke ([facility] [address] 2006    left sided weakness    Thromboembolus ([facility] [address]     Thyroid disease     TIA (transient ischemic attack) 2012     Past Surgical History:   Procedure Laterality Date    HX ORTHOPAEDIC      KNEE ARTHROSCP HARV      left knee       Prior Level of Function/Environment/Context: pt has transient living situation, per medical record has been independent, walks with a cane  Occupations in which the patient is/was successful, what are the barriers preventing that success: pain, residual L hemiparesis and hypersensitivity L side  Performance Patterns (routines, roles, habits, and rituals): chronic pain  Personal Interests and/or values:   Expanded or extensive additional review of patient history:     Home Situation  Home Environment: Private residence  One/Two Story Residence: Other (Comment) (friends house or hotel)  Living Alone: Yes  Support Systems: Family member(s)  Patient Expects to be Discharged to[de-identified] Patient room  Current DME Used/Available at Home: Cane, straight  [x]  Right hand dominant   []  Left hand dominant    EXAMINATION OF PERFORMANCE DEFICITS:  Cognitive/Behavioral Status:  Neurologic State: Alert  Orientation Level: Oriented to person;Oriented to place;Oriented to situation;Oriented to time  Cognition: Follows commands  Perception: Appears intact  Perseveration: No perseveration noted  Safety/Judgement: Awareness of environment; Fall prevention; Insight into deficits    Skin: impaired L foot-for I&D this date    Edema: none observed    Hearing: Auditory  Auditory Impairment: None    Vision/Perceptual:                         Appears intact  Acuity:  (no glasses present)         Range of Motion:  **BUEs:*  AROM:  (LUE minimal mobility in LUE--hypersensitive to touch entire )  PROM:  (will not allow touch to LUE/poor tolerance for safety belt) PROm unable to be assessed. Strength:  BUEs:  Strength: Grossly decreased, non-functional (LUE non functional 0/5) not formally tested-pt does not allow touch due to hypersensitivity and pain.                 Coordination:  Coordination: Grossly decreased, non-functional (LUE impaired; RUE intact)  Fine Motor Skills-Upper: Left Impaired;Right Intact    Gross Motor Skills-Upper: Left Impaired;Right Intact    Tone & Sensation:    Tone: Abnormal (LUE due to old cva)  Sensation: Impaired (hypersensitive to touch/ especially to initial touch, acclimates to gait belt)                      Balance:  Sitting: Intact  Standing: Impaired  Standing - Static: Constant support;Poor  Standing - Dynamic :  (not tested)    Functional Mobility and Transfers for ADLs:  Bed Mobility:  Rolling:  (pt seatead EOB, then transferred to [facility] for toilet and bathe)    Transfers:  Sit to Stand: Maximum assistance (did not assume full standing)  Stand to Sit: Maximum assistance (for squat pivot transfer assist of second person to stbilize)  Toilet Transfer : Maximum assistance (to [facility] with squat pivot transfer and 2nd assist for stbize b)    ADL Assessment:  Feeding: Setup    Oral Facial Hygiene/Grooming: Stand-by assistance;Setup    Bathing: Minimum assistance; Moderate assistance (sponge bathing)    Upper Body Dressing: Minimum assistance    Lower Body Dressing: Maximum assistance    Toileting: Maximum assistance (to manage mobilitiy on [facility])                ADL Intervention and task modifications:     Pt was set up by staff for surgical prep bathing. Performed max A BSC transfer-bed to [facility]. Pt provided soft and absorbant cloth covered tube to prevent fingernnails from causing skin breakdopwn in L palm                           Cognitive Retraining  Safety/Judgement: Awareness of environment; Fall prevention; Insight into deficits    Functional Measure:  Barthel Index:    Bathin  Bladder: 10  Bowels: 10  Groomin  Dressin  Feeding: 10  Mobility: 0  Stairs: 0  Toilet Use: 5  Transfer (Bed to Chair and Back): 5  Total: 50       Barthel and G-code impairment scale:  Percentage of impairment CH  0% CI  1-19% CJ  20-39% CK  40-59% CL  60-79% CM  80-99% CN  100%   Barthel Score 0-100 100 99-80 79-60 59-40 20-39 1-19   0   Barthel Score 0-20 20 17-19 13-16 9-12 5-8 1-4 0      The Barthel ADL Index: Guidelines  1. The index should be used as a record of what a patient does, not as a record of what a patient could do. 2. The main aim is to establish degree of independence from any help, physical or verbal, however minor and for whatever reason. 3. The need for supervision renders the patient not independent. 4. A patient's performance should be established using the best available evidence. Asking the patient, friends/relatives and nurses are the usual sources, but direct observation and common sense are also important. However direct testing is not needed. 5. Usually the patient's performance over the preceding 24-48 hours is important, but occasionally longer periods will be relevant. 6. Middle categories imply that the patient supplies over 50 per cent of the effort. 7. Use of aids to be independent is allowed. Moreno Carvalho., Barthel, YOON. (6916). Functional evaluation: the Barthel Index. [address] [facility] (14)2. Jordyn Rene balwinder ISAI Thompson, Josué Zeng., Sandro Meier., [place], [address] [place] [address] (1999). Measuring the change indisability after inpatient rehabilitation; comparison of the responsiveness of the Barthel Index and Functional [place] Measure. Journal of Neurology, Neurosurgery, and Psychiatry, 66(4), 320-887. Jumana Ann, N.J.A, ELEANOR Hines, & Pako Aquino, M.A. (2004.) Assessment of post-stroke quality of life in cost-effectiveness studies: The usefulness of the Barthel Index and the EuroQoL-5D. Quality of Life Research, 13, 035-45       G codes: In compliance with CMSs Claims Based Outcome Reporting, the following G-code set was chosen for this patient based on their primary functional limitation being treated: The outcome measure chosen to determine the severity of the functional limitation was the Barthel index with a score of 50/100 which was correlated with the impairment scale. ?  Self Care:     - CURRENT STATUS: CK - 40%-59% impaired, limited or restricted    - GOAL STATUS: CJ - 20%-39% impaired, limited or restricted    - D/C STATUS:  ---------------To be determined---------------     Occupational Therapy Evaluation Charge Determination   History Examination Decision-Making   LOW Complexity : Brief history review  MEDIUM Complexity : 3-5 performance deficits relating to physical, cognitive , or psychosocial skils that result in activity limitations and / or participation restrictions MEDIUM Complexity : Patient may present with comorbidities that affect occupational performnce. Miniml to moderate modification of tasks or assistance (eg, physical or verbal ) with assesment(s) is necessary to enable patient to complete evaluation       Based on the above components, the patient evaluation is determined to be of the following complexity level: LOW   Pain:  Pain Scale 1: Numeric (0 - 10)  Pain Intensity 1: 8  Pain Location 1: Foot  Pain Orientation 1: Left  Pain Description 1: Aching;Burning  Pain Intervention(s) 1: Elevation    After treatment:   [x] Patient left in no apparent distress sitting up on [facility] chair nursing to monitor  [] Patient left in no apparent distress in bed  [x] Call bell left within reach  [x] Nursing notified  [] Caregiver present  [] Bed alarm activated    COMMUNICATION/EDUCATION:   The patients plan of care was discussed with: Registered Nurse and Certified Nursing Assistant/Patient Care Technician.  [] Home safety education was provided and the patient/caregiver indicated understanding. [x] Patient/family have participated as able in goal setting and plan of care. [] Patient/family agree to work toward stated goals and plan of care. [] Patient understands intent and goals of therapy, but is neutral about his/her participation. [] Patient is unable to participate in goal setting and plan of care. This patients plan of care is appropriate for delegation to [facility].     Thank you for this referral.  Jyothi Grijalva, OTR/L  Time Calculation: 17 mins

## 2017-12-30 LAB
ANION GAP SERPL CALC-SCNC: 6 MMOL/L (ref 5–15)
BUN SERPL-MCNC: 13 MG/DL (ref 6–20)
BUN/CREAT SERPL: 19 (ref 12–20)
CALCIUM SERPL-MCNC: 8.6 MG/DL (ref 8.5–10.1)
CHLORIDE SERPL-SCNC: 102 MMOL/L (ref 97–108)
CO2 SERPL-SCNC: 28 MMOL/L (ref 21–32)
CREAT SERPL-MCNC: 0.7 MG/DL (ref 0.7–1.3)
ERYTHROCYTE [DISTWIDTH] IN BLOOD BY AUTOMATED COUNT: 16.7 % (ref 11.5–14.5)
GLUCOSE SERPL-MCNC: 105 MG/DL (ref 65–100)
HCT VFR BLD AUTO: 36.4 % (ref 36.6–50.3)
HGB BLD-MCNC: 11.9 G/DL (ref 12.1–17)
MAGNESIUM SERPL-MCNC: 2 MG/DL (ref 1.6–2.4)
MCH RBC QN AUTO: 28.5 PG (ref 26–34)
MCHC RBC AUTO-ENTMCNC: 32.7 G/DL (ref 30–36.5)
MCV RBC AUTO: 87.1 FL (ref 80–99)
PHOSPHATE SERPL-MCNC: 4.9 MG/DL (ref 2.6–4.7)
PLATELET # BLD AUTO: 365 K/UL (ref 150–400)
POTASSIUM SERPL-SCNC: 4 MMOL/L (ref 3.5–5.1)
RBC # BLD AUTO: 4.18 M/UL (ref 4.1–5.7)
SODIUM SERPL-SCNC: 136 MMOL/L (ref 136–145)
WBC # BLD AUTO: 9 K/UL (ref 4.1–11.1)

## 2017-12-30 PROCEDURE — 74011250636 HC RX REV CODE- 250/636: Performed by: INTERNAL MEDICINE

## 2017-12-30 PROCEDURE — 84100 ASSAY OF PHOSPHORUS: CPT | Performed by: HOSPITALIST

## 2017-12-30 PROCEDURE — 83735 ASSAY OF MAGNESIUM: CPT | Performed by: HOSPITALIST

## 2017-12-30 PROCEDURE — 65660000000 HC RM CCU STEPDOWN

## 2017-12-30 PROCEDURE — 74011250637 HC RX REV CODE- 250/637: Performed by: INTERNAL MEDICINE

## 2017-12-30 PROCEDURE — 36415 COLL VENOUS BLD VENIPUNCTURE: CPT | Performed by: HOSPITALIST

## 2017-12-30 PROCEDURE — 85027 COMPLETE CBC AUTOMATED: CPT | Performed by: HOSPITALIST

## 2017-12-30 PROCEDURE — 74011000258 HC RX REV CODE- 258: Performed by: INTERNAL MEDICINE

## 2017-12-30 PROCEDURE — 74011250637 HC RX REV CODE- 250/637: Performed by: HOSPITALIST

## 2017-12-30 PROCEDURE — 74011250637 HC RX REV CODE- 250/637: Performed by: NURSE PRACTITIONER

## 2017-12-30 PROCEDURE — 80048 BASIC METABOLIC PNL TOTAL CA: CPT | Performed by: HOSPITALIST

## 2017-12-30 RX ADMIN — ENOXAPARIN SODIUM 40 MG: 40 INJECTION SUBCUTANEOUS at 17:17

## 2017-12-30 RX ADMIN — FAMOTIDINE 20 MG: 20 TABLET ORAL at 17:17

## 2017-12-30 RX ADMIN — Medication 10 ML: at 14:50

## 2017-12-30 RX ADMIN — COLCHICINE 0.6 MG: 0.6 TABLET, FILM COATED ORAL at 17:17

## 2017-12-30 RX ADMIN — METHADONE HYDROCHLORIDE 80 MG: 10 TABLET ORAL at 09:37

## 2017-12-30 RX ADMIN — Medication 10 ML: at 06:11

## 2017-12-30 RX ADMIN — HYDROMORPHONE HYDROCHLORIDE 8 MG: 2 TABLET ORAL at 02:18

## 2017-12-30 RX ADMIN — HYDROMORPHONE HYDROCHLORIDE 8 MG: 2 TABLET ORAL at 21:44

## 2017-12-30 RX ADMIN — HYDROMORPHONE HYDROCHLORIDE 8 MG: 2 TABLET ORAL at 18:25

## 2017-12-30 RX ADMIN — HYDROMORPHONE HYDROCHLORIDE 8 MG: 2 TABLET ORAL at 14:50

## 2017-12-30 RX ADMIN — DOCUSATE SODIUM 100 MG: 100 CAPSULE, LIQUID FILLED ORAL at 17:17

## 2017-12-30 RX ADMIN — Medication 1 CAPSULE: at 08:26

## 2017-12-30 RX ADMIN — LISINOPRIL 40 MG: 20 TABLET ORAL at 08:26

## 2017-12-30 RX ADMIN — SODIUM CHLORIDE: 900 INJECTION, SOLUTION INTRAVENOUS at 06:12

## 2017-12-30 RX ADMIN — SODIUM CHLORIDE: 900 INJECTION, SOLUTION INTRAVENOUS at 21:33

## 2017-12-30 RX ADMIN — HYDROMORPHONE HYDROCHLORIDE 8 MG: 2 TABLET ORAL at 06:10

## 2017-12-30 RX ADMIN — DOCUSATE SODIUM 100 MG: 100 CAPSULE, LIQUID FILLED ORAL at 08:27

## 2017-12-30 RX ADMIN — LEVOTHYROXINE SODIUM 75 MCG: 75 TABLET ORAL at 08:26

## 2017-12-30 RX ADMIN — HYDROMORPHONE HYDROCHLORIDE 8 MG: 2 TABLET ORAL at 09:59

## 2017-12-30 RX ADMIN — ASPIRIN 81 MG 81 MG: 81 TABLET ORAL at 08:26

## 2017-12-30 RX ADMIN — FAMOTIDINE 20 MG: 20 TABLET ORAL at 08:27

## 2017-12-30 RX ADMIN — SODIUM CHLORIDE: 900 INJECTION, SOLUTION INTRAVENOUS at 14:50

## 2017-12-30 RX ADMIN — COLCHICINE 0.6 MG: 0.6 TABLET, FILM COATED ORAL at 08:26

## 2017-12-30 NOTE — BRIEF OP NOTE
BRIEF OPERATIVE NOTE    Date of Procedure: 12/29/2017   Preoperative Diagnosis: necrotic tissue left foot  Postoperative Diagnosis: necrotic tissue left foot    Procedure(s):  Excisional debridement of necrotic tissue left foot  Surgeon(s) and Role:     * Tunde Vuong DPM - Primary         Assistant Staff:       Surgical Staff:  Circ-1: Papo Hernandez RN  Scrub Tech-1: Deep Farris  Event Time In   Incision Start 1837   Incision Close 1853     Anesthesia: MAC   Estimated Blood Loss: min  Specimens:   ID Type Source Tests Collected by Time Destination   1 : LEFT FOOT WOUND Wound Foot, left AEROBIC/ANAEROBIC CULTURE, All Dumont DPM 12/29/2017 1841 Microbiology      Findings: necrotic tissue  Complications:none  Implants: * No implants in log *

## 2017-12-30 NOTE — PROGRESS NOTES
Patient post op debridement of necrotic tissue left foot  Tolerated procedure well  Will leave case management orders for home health wound care  From a podiatry standpoint this patient can be discharged pending antibiotic therapy managed by Dr. Christian Panda

## 2017-12-30 NOTE — PERIOP NOTES
1902-Handoff Report from Operating Room to PACU    Report received from Gonzalo Emanuel 139 and SIMONA Rodas CRNA regarding Meredith Blood. Surgeon(s):  Meena Briggs DPM  And Procedure(s) (LRB):  INCISION AND DRAINAGE LEFT FOOT (Left)  confirmed   with allergies and dressings discussed. Anesthesia type, drugs, patient history, complications, estimated blood loss, vital signs, intake and output, and last pain medication, lines and temperature were reviewed. 80- Dr. Levi Juarez notified of pt's continuous complaint of 10/10 pain despite pain meds and VS being stable (see doc flow sheets). Dr. Stanton Hernadez told the pt that he had a very large amount of lidocaine in his foot and that his foot should be numb for hours. Pt states, \"It still hurts, nothing has gotten better. \" Pt also states that he \"knows the pain is going to get worse once the numbness wears off. \" Dr. Stanton Hernadez explained to the pt extensively about the wound and the local injection he received and that the pain he is feeling is not from the surgery she just performed. Pt does not seem to understand at this time. 2000- Left foot elevated. Provided pt with ginger ale and crackers. 2005- Pt is sitting up eating and drinking, he states he does feel \"better\" but still in pain. Dr. Levi Juarez signed pt out from anesthesia stand point and agreed pt is ready to go back to his room. 2015-TRANSFER - OUT REPORT:    Verbal report given to Lara Avina RN(name) on Meredith Blood  being transferred to ortho (unit) for routine post - op       Report consisted of patients Situation, Background, Assessment and   Recommendations(SBAR). Information from the following report(s) SBAR, Kardex, OR Summary, Procedure Summary, Intake/Output, MAR and Recent Results was reviewed with the receiving nurse. Opportunity for questions and clarification was provided.       Patient transported with:   Registered Nursex2

## 2017-12-30 NOTE — PROGRESS NOTES
At this time D/C is pending review from medical providers. CM consult received to arrange [facility]. Pt has chosen [facility] . Referral sent via [address]. CM requested for RN to contact CM upon Pt being D/C today. CM will remain available and assist with coordination of care needs.     Haley Coelho LCSW   Ext # [phone]

## 2017-12-30 NOTE — OP NOTES
[facility]  OPERATIVE REPORT    Chanell Freeman  MR#: [id number]  : 1965  ACCOUNT #: [de-identified]   DATE OF SERVICE: 2017    SURGEON:  Mary Beth Hussein DPM     PREOPERATIVE DIAGNOSIS:  Necrotic chronic wound, dorsal aspect, left foot. POSTOPERATIVE DIAGNOSIS:  Necrotic chronic wound, dorsal aspect, left foot. PROCEDURE PERFORMED:  Excisional debridement of necrotic chronic wound, dorsal left foot. ANESTHESIA:  IV sedation with local.     ESTIMATED BLOOD LOSS:  Minimal.     SPECIMENS REMOVED:  Wound cultures. COMPLICATIONS:       INDICATIONS:  This 79-year-old white male presents with a painful wound, dorsal left foot. Patient states wound has been present for more than one year. Had presented to the emergency room 10 days ago with bilateral lower leg cellulitis. PAST MEDICAL HISTORY:  Includes chronic cellulitis lower extremities, chronic pain syndrome, hyperlipidemia, gout, and substance abuse. Otherwise, admitting history and physical is unchanged. Patient has been released for surgery medically. PHYSICAL EXAMINATION:  Lower extremities reveal barely palpable pedal pulses, with fairly good muscle strength lower extremities bilateral, mild erythema lower extremities. The patient has reported severe pain, lower extremities. Dorsal aspect of the left foot, base of toes 2 and 3, there is a yellow exudate of the wound that measures 3 x 2 cm. There is no erythema extending from the wound. However, the patient states that it is painful and has been present for more than a year. IMAGING:  An MRI indicates no evidence of abscess or osteomyelitis, left foot. ABIs performed indicate adequate arterial lower extremity perfusion. I spoke with Dr. Dakota Tovar who feels that this patient does not have arterial disease and should be fine for surgical debridement of wound.     DESCRIPTION OF PROCEDURE:  The patient was brought to the operating room and placed on the operating table in supine position. Under the influence of IV sedation anesthesia was achieved to the left foot using 0.25% Marcaine with epinephrine. A successful timeout was completed. Next, using a curette as well as a #15 blade, an excisional debridement was performed of the wound through the subcutaneous layer, removing subcutaneous tissue, yellow exudate, granulomatous tissue as well as necrotic tissue. Deep fascia layer was not involved. There was no purulence noted. Aerobic as well as anaerobic wound cultures were taken. The wound was flushed copiously using saline with bacitracin dissolved in it. Post-debridement measurement of the wound was 7 x 3.3 x 0.2 cm. A good bleeding base was achieved, 0.25% Marcaine with epinephrine was once again infused around the wound. The wound was dressed with Xeroform followed by dry sterile dressings with an outer Ace. The patient appeared to tolerate anesthesia and procedure well and was transferred from the operating room to recovery with all vital signs stable. The patient is to be transferred back to room 3241. At this time, I do feel that the patient from a podiatry standpoint, can be discharged in the morning pending antibiotic therapy managed by Dr. Kamilah Weinberg. Orders were left for case management to arrange home health care, dressing changes, and patient is to follow in my office within seven to ten days upon discharge.       JENAE Mendosa  D: 12/29/2017 19:20     T: 12/29/2017 20:09  JOB #: [id number]

## 2017-12-30 NOTE — ROUTINE PROCESS
Bedside and Verbal shift change report given to Delvis Martin  (oncoming nurse) by Carrie Myles RN (offgoing nurse). Report included the following information SBAR, Kardex, Intake/Output, MAR and Recent Results.

## 2017-12-30 NOTE — ANESTHESIA POSTPROCEDURE EVALUATION
Post-Anesthesia Evaluation and Assessment    Patient: Nasreen Dutton MRN: [id number]  SSN: [id number]    YOB: 1965  Age: 47 y.o. Sex: male       Cardiovascular Function/Vital Signs  Visit Vitals    /84    Pulse 63    Temp 36.8 °C (98.3 °F)    Resp 10    Ht 6' 1\" (1.854 m)    Wt 94.3 kg (208 lb)    SpO2 99%    BMI 27.44 kg/m2       Patient is status post general, total IV anesthesia anesthesia for Procedure(s):  INCISION AND DRAINAGE LEFT FOOT. Nausea/Vomiting: None    Postoperative hydration reviewed and adequate. Pain:  Pain Scale 1: Numeric (0 - 10) (12/29/17 1713)  Pain Intensity 1: 0 (12/29/17 1713)   Managed    Neurological Status:   Neuro (WDL): Within Defined Limits (12/29/17 1708)  Neuro  Neurologic State: Alert (12/29/17 1500)  Orientation Level: Oriented to person;Oriented to place;Oriented to situation;Oriented to time (12/29/17 1500)  Cognition: Follows commands (12/29/17 1500)  Speech: Clear (12/29/17 0748)  LUE Motor Response: Rigid;Weak (12/29/17 0748)  LLE Motor Response: Weak;Rigid (12/29/17 0748)  RUE Motor Response: Purposeful (12/29/17 0748)  RLE Motor Response: Purposeful;Weak (12/29/17 0748)   At baseline    Mental Status and Level of Consciousness: Arousable    Pulmonary Status:   O2 Device: Nasal cannula (12/29/17 1902)   Adequate oxygenation and airway patent    Complications related to anesthesia: None    Post-anesthesia assessment completed.  No concerns    Signed By: Ruma Craven MD     December 29, 2017

## 2017-12-30 NOTE — PROGRESS NOTES
Problem: Falls - Risk of  Goal: *Absence of Falls  Document Jordon Fall Risk and appropriate interventions in the flowsheet.    Outcome: Progressing Towards Goal  Fall Risk Interventions:  Mobility Interventions: Patient to call before getting OOB    Mentation Interventions: Adequate sleep, hydration, pain control    Medication Interventions: Patient to call before getting OOB, Teach patient to arise slowly    Elimination Interventions: Call light in reach, Patient to call for help with toileting needs

## 2017-12-30 NOTE — PROGRESS NOTES
Hospitalist Progress Note    NAME: Channing Kilpatrick   :  1965       Assessment / Plan:  LE cellulitis POA with purulent drainage  Chronic non healing L Foot ulcer/wound with cellulitis POA  Chronic   zosyn day 10 ( ), vanco dc   BC NTD  wound cultures: pseudomonas. Enterococcus and Grp B strep in thio broth  D/C on Cipro 750 mg po bid, Augmentin 875 mg bid x 10 days. D/C planning once cleared by podiatry from a wound care standpoint  MRI left foot :No fluid collection/ osteomyelitis  ain management: appreciated palliative care team greatly  1. Pain level should be based on observation, not patient rating. 2. Increase the Methadone to 80mg/day. 3. Increase the Dilaudid to 8mg PO q. 4 hours prn.  4. Discontinue the IV Dilaudid. 5. When pt is ready for discharge, do not provide any opioid prescriptions. The methadone will prevent withdrawal symptoms when the dilaudid is stopped.    Primary pain specialist: Dr. Ludwin Anthony will be seeing as needed  Vascular surgery and podiatry help appreciated  Pt seen twice by Dr Allison Mc  and as second opinion Dr Ashley Beyer   Alna to have adequate left foot arterial profusion to undergo debridement of tissue  OR for debridement 2017  LE dopplers negative for DVT     Essential HTN POA/ sinus bradycardia   BP 130s, stable   HR low to mid 50s - can be related to methadone / dilaudid use --> need to be watched closely ; asymptomatic at this time  monitor on tele   Increased home lisinopril this admission, watch cr/K with it     Bladder mass   Per pt he was diagnosed with a bladder mass at [facility] per pt  Unable to get records  follow with urology OP as planned previously ( per pt he has appointment)     Left Hemiparesis from Old CVA POA    Chronic Pain syndrome on maintenance Methadone at [address] [place] [address] 65 mg daily     Hyperlipidemia, not on meds at home     Gout arthritis, cont allopurinol     Code Status: Full    Surrogate Decision Maker: sister Zaheer Patches # [phone]    DVT Prophylaxis: SQ Lovenox    Baseline: lives alone, ambulating with cane    Body mass index is 27.44 kg/(m^2). Recommended Disposition:   D/w pt dc planning today: awaiting debridement today then once OK with podiatry can be dc      Subjective:     Chief Complaint / Reason for Physician Visit: left foot cellulitis / HTN   \"my foot is real sore\"  OR yesterday to debride left foot  Concerned because methadone is delayed this AM  No other complaints  Discussed with RN events overnight. Review of Systems:  Symptom Y/N Comments  Symptom Y/N Comments   Fever/Chills n   Chest Pain n    Poor Appetite    Edema     Cough    Abdominal Pain n    Sputum    Joint Pain     SOB/BECK yn   Pruritis/Rash     Nausea/vomit n   Tolerating PT/OT y    Diarrhea n   Tolerating Diet     Constipation n   Other       Could NOT obtain due to:      Objective:     VITALS:   Last 24hrs VS reviewed since prior progress note.  Most recent are:  Patient Vitals for the past 24 hrs:   Temp Pulse Resp BP SpO2   12/30/17 0635 98.2 °F (36.8 °C) (!) 57 15 124/83 98 %   12/30/17 0210 98.3 °F (36.8 °C) (!) 57 17 141/76 98 %   12/29/17 2140 98.3 °F (36.8 °C) (!) 58 18 135/83 98 %   12/29/17 2015 98.3 °F (36.8 °C) 67 12 150/90 95 %   12/29/17 2000 - 61 12 154/65 99 %   12/29/17 1945 - 61 11 145/86 99 %   12/29/17 1934 - (!) 58 12 138/88 97 %   12/29/17 1915 - 60 10 117/85 99 %   12/29/17 1913 - 63 16 133/86 92 %   12/29/17 1910 - 61 12 (!) 142/120 99 %   12/29/17 1905 - 63 10 (!) 130/93 99 %   12/29/17 1902 98.3 °F (36.8 °C) 67 12 115/84 100 %   12/29/17 1713 98.2 °F (36.8 °C) (!) 51 18 133/66 95 %   12/29/17 1407 98.5 °F (36.9 °C) 82 17 135/89 96 %   12/29/17 1005 98.2 °F (36.8 °C) 67 17 91/74 94 %       Intake/Output Summary (Last 24 hours) at 12/30/17 0938  Last data filed at 12/30/17 0700   Gross per 24 hour   Intake              600 ml   Output              910 ml   Net             -310 ml PHYSICAL EXAM:  General: WD, WN. Alert, cooperative, no acute distress    EENT:  EOMI. Anicteric sclerae. MMM  Resp:  CTA bilaterally, no wheezing or rales. No accessory muscle use  CV:  Regular  rhythm,  R LE swelling/erythema - resolved; L foot swelling better     GI:  Soft, Non distended, Non tender.  +Bowel sounds  Neurologic:  Alert and oriented X 3, normal speech,   Psych:   Good insight. Not anxious nor agitated  Skin:  No rashes. No jaundice. Left foot is bandaged post-op                                                                        12/22 12/26              Reviewed most current lab test results and cultures  YES  Reviewed most current radiology test results   YES  Review and summation of old records today    NO  Reviewed patient's current orders and MAR    YES  PMH/SH reviewed - no change compared to H&P  ________________________________________________________________________  Care Plan discussed with:    Comments   Patient y    Family      RN y    Care Manager     Consultant                        Multidiciplinary team rounds were held today with , nursing, pharmacist and clinical coordinator. Patient's plan of care was discussed; medications were reviewed and discharge planning was addressed. ________________________________________________________________________  Total NON critical care TIME:  35 Minutes    Total CRITICAL CARE TIME Spent:   Minutes non procedure based      Comments   >50% of visit spent in counseling and coordination of care     ________________________________________________________________________  Brynda Fleischer, MD     Procedures: see electronic medical records for all procedures/Xrays and details which were not copied into this note but were reviewed prior to creation of Plan. LABS:  I reviewed today's most current labs and imaging studies.   Pertinent labs include:  Recent Labs      12/30/17   0237  12/29/17   0359   WBC  9.0  7.8 HGB  11.9*  11.6*   HCT  36.4*  36.9   PLT  365  334     Recent Labs      12/30/17   0237  12/29/17   0359  12/28/17   0425   NA  136  137  135*   K  4.0  4.1  4.1   CL  102  100  100   CO2  28  31  29   GLU  105*  93  107*   BUN  13  13  13   CREA  0.70  0.69*  0.64*   CA  8.6  9.0  8.6   MG  2.0  2.1   --    PHOS  4.9*  4.7   --        Signed: Anju Allen MD

## 2017-12-30 NOTE — PROGRESS NOTES
Bedside shift change report given to Emelia (oncoming nurse) by Zenia Burk (offgoing nurse). Report included the following information SBAR, Kardex, Intake/Output, MAR, Accordion and Recent Results.

## 2017-12-31 ENCOUNTER — HOME HEALTH ADMISSION (OUTPATIENT)
Dept: HOME HEALTH SERVICES | Facility: HOME HEALTH | Age: 52
End: 2017-12-31

## 2017-12-31 LAB
ANION GAP SERPL CALC-SCNC: 5 MMOL/L (ref 5–15)
BASOPHILS # BLD: 0.1 K/UL (ref 0–0.1)
BASOPHILS NFR BLD: 1 % (ref 0–1)
BUN SERPL-MCNC: 13 MG/DL (ref 6–20)
BUN/CREAT SERPL: 21 (ref 12–20)
CALCIUM SERPL-MCNC: 8.8 MG/DL (ref 8.5–10.1)
CHLORIDE SERPL-SCNC: 103 MMOL/L (ref 97–108)
CO2 SERPL-SCNC: 29 MMOL/L (ref 21–32)
CREAT SERPL-MCNC: 0.61 MG/DL (ref 0.7–1.3)
EOSINOPHIL # BLD: 0.5 K/UL (ref 0–0.4)
EOSINOPHIL NFR BLD: 7 % (ref 0–7)
ERYTHROCYTE [DISTWIDTH] IN BLOOD BY AUTOMATED COUNT: 16.5 % (ref 11.5–14.5)
GLUCOSE SERPL-MCNC: 102 MG/DL (ref 65–100)
HCT VFR BLD AUTO: 35.7 % (ref 36.6–50.3)
HGB BLD-MCNC: 11.5 G/DL (ref 12.1–17)
LYMPHOCYTES # BLD: 3.2 K/UL (ref 0.8–3.5)
LYMPHOCYTES NFR BLD: 40 % (ref 12–49)
MCH RBC QN AUTO: 27.4 PG (ref 26–34)
MCHC RBC AUTO-ENTMCNC: 32.2 G/DL (ref 30–36.5)
MCV RBC AUTO: 85.2 FL (ref 80–99)
MONOCYTES # BLD: 0.9 K/UL (ref 0–1)
MONOCYTES NFR BLD: 12 % (ref 5–13)
NEUTS SEG # BLD: 3.2 K/UL (ref 1.8–8)
NEUTS SEG NFR BLD: 40 % (ref 32–75)
PLATELET # BLD AUTO: 350 K/UL (ref 150–400)
POTASSIUM SERPL-SCNC: 4.2 MMOL/L (ref 3.5–5.1)
RBC # BLD AUTO: 4.19 M/UL (ref 4.1–5.7)
SODIUM SERPL-SCNC: 137 MMOL/L (ref 136–145)
WBC # BLD AUTO: 7.9 K/UL (ref 4.1–11.1)

## 2017-12-31 PROCEDURE — 65660000000 HC RM CCU STEPDOWN

## 2017-12-31 PROCEDURE — 74011250637 HC RX REV CODE- 250/637: Performed by: INTERNAL MEDICINE

## 2017-12-31 PROCEDURE — 74011250636 HC RX REV CODE- 250/636: Performed by: INTERNAL MEDICINE

## 2017-12-31 PROCEDURE — 74011250637 HC RX REV CODE- 250/637: Performed by: NURSE PRACTITIONER

## 2017-12-31 PROCEDURE — 74011000258 HC RX REV CODE- 258: Performed by: INTERNAL MEDICINE

## 2017-12-31 PROCEDURE — 36415 COLL VENOUS BLD VENIPUNCTURE: CPT | Performed by: INTERNAL MEDICINE

## 2017-12-31 PROCEDURE — 85025 COMPLETE CBC W/AUTO DIFF WBC: CPT | Performed by: INTERNAL MEDICINE

## 2017-12-31 PROCEDURE — 74011250637 HC RX REV CODE- 250/637: Performed by: HOSPITALIST

## 2017-12-31 PROCEDURE — 80048 BASIC METABOLIC PNL TOTAL CA: CPT | Performed by: INTERNAL MEDICINE

## 2017-12-31 RX ADMIN — Medication 1 CAPSULE: at 09:09

## 2017-12-31 RX ADMIN — POLYETHYLENE GLYCOL 3350 17 G: 17 POWDER, FOR SOLUTION ORAL at 09:09

## 2017-12-31 RX ADMIN — ASPIRIN 81 MG 81 MG: 81 TABLET ORAL at 09:09

## 2017-12-31 RX ADMIN — METHADONE HYDROCHLORIDE 80 MG: 10 TABLET ORAL at 10:52

## 2017-12-31 RX ADMIN — COLCHICINE 0.6 MG: 0.6 TABLET, FILM COATED ORAL at 17:09

## 2017-12-31 RX ADMIN — SODIUM CHLORIDE: 900 INJECTION, SOLUTION INTRAVENOUS at 21:40

## 2017-12-31 RX ADMIN — FAMOTIDINE 20 MG: 20 TABLET ORAL at 17:09

## 2017-12-31 RX ADMIN — LISINOPRIL 40 MG: 20 TABLET ORAL at 09:09

## 2017-12-31 RX ADMIN — SODIUM CHLORIDE: 900 INJECTION, SOLUTION INTRAVENOUS at 14:27

## 2017-12-31 RX ADMIN — LEVOTHYROXINE SODIUM 75 MCG: 75 TABLET ORAL at 09:10

## 2017-12-31 RX ADMIN — DOCUSATE SODIUM 100 MG: 100 CAPSULE, LIQUID FILLED ORAL at 09:09

## 2017-12-31 RX ADMIN — ALLOPURINOL 100 MG: 100 TABLET ORAL at 09:09

## 2017-12-31 RX ADMIN — FAMOTIDINE 20 MG: 20 TABLET ORAL at 09:09

## 2017-12-31 RX ADMIN — Medication 10 ML: at 14:27

## 2017-12-31 RX ADMIN — Medication 10 ML: at 21:44

## 2017-12-31 RX ADMIN — HYDROMORPHONE HYDROCHLORIDE 8 MG: 2 TABLET ORAL at 10:13

## 2017-12-31 RX ADMIN — HYDROMORPHONE HYDROCHLORIDE 8 MG: 2 TABLET ORAL at 05:49

## 2017-12-31 RX ADMIN — HYDROMORPHONE HYDROCHLORIDE 8 MG: 2 TABLET ORAL at 18:10

## 2017-12-31 RX ADMIN — DOCUSATE SODIUM 100 MG: 100 CAPSULE, LIQUID FILLED ORAL at 17:09

## 2017-12-31 RX ADMIN — COLCHICINE 0.6 MG: 0.6 TABLET, FILM COATED ORAL at 09:09

## 2017-12-31 RX ADMIN — HYDROMORPHONE HYDROCHLORIDE 8 MG: 2 TABLET ORAL at 14:32

## 2017-12-31 RX ADMIN — HYDROMORPHONE HYDROCHLORIDE 8 MG: 2 TABLET ORAL at 02:01

## 2017-12-31 RX ADMIN — HYDROMORPHONE HYDROCHLORIDE 8 MG: 2 TABLET ORAL at 21:39

## 2017-12-31 RX ADMIN — SODIUM CHLORIDE: 900 INJECTION, SOLUTION INTRAVENOUS at 05:49

## 2017-12-31 RX ADMIN — ENOXAPARIN SODIUM 40 MG: 40 INJECTION SUBCUTANEOUS at 16:11

## 2017-12-31 NOTE — ROUTINE PROCESS
Bedside and Verbal shift change report given to Alice St (oncoming nurse) by Daniel Green RN (offgoing nurse). Report included the following information SBAR, Kardex, Intake/Output, MAR and Recent Results.

## 2017-12-31 NOTE — PROGRESS NOTES
As per pt he doesn't have a stable place to stay. Previously staying in a  [employer] motel paying $55/day. Pt is worried about going back to [facility] with [facility] services. Pt said he made several calls to his friend to arrange a room however no one answer his calls. CM checked with [facility] and they are willing to provide [facility] [facility] service starting on tuesdays. pt need a dressing changes every other day. pt need to be home bound in order to provide [facility] services. A PT/OT final recommendation will be beneficial for pt's safer disposition. Pt had previous [facility] though [address] [facility].     Riley Barkley [facility] [place]  ED Case Manager   Ext -[phone]

## 2017-12-31 NOTE — PROGRESS NOTES
Hospitalist Progress Note    NAME: David Cerda   :  1965       Assessment / Plan:  LE cellulitis POA with purulent drainage  Chronic non healing L Foot ulcer/wound with cellulitis POA  Chronic   zosyn day 10 ( ), vanco dc   BC NTD  wound cultures: pseudomonas. Enterococcus and Grp B strep in thio broth  D/C on Cipro 750 mg po bid, Augmentin 875 mg bid x 10 days. D/C planning once cleared by podiatry from a wound care standpoint  MRI left foot :No fluid collection/ osteomyelitis  ain management: appreciated palliative care team greatly  1. Pain level should be based on observation, not patient rating. 2. Increase the Methadone to 80mg/day. 3. Increase the Dilaudid to 8mg PO q. 4 hours prn.  4. Discontinue the IV Dilaudid. 5. When pt is ready for discharge, do not provide any opioid prescriptions. The methadone will prevent withdrawal symptoms when the dilaudid is stopped.    Primary pain specialist: Dr. Irineo Acevedo will be seeing as needed  Vascular surgery and podiatry help appreciated  Pt seen twice by Dr Armida York  and as second opinion Dr Sandhya Weir   Felt to have adequate left foot arterial profusion to undergo debridement of tissue  OR for debridement 2017  LE dopplers negative for DVT     Essential HTN POA/ sinus bradycardia   BP 130s, stable   HR low to mid 50s - can be related to methadone / dilaudid use --> need to be watched closely ; asymptomatic at this time  monitor on tele   Increased home lisinopril this admission, watch cr/K with it     Bladder mass   Per pt he was diagnosed with a bladder mass at [facility] per pt  Unable to get records  follow with urology OP as planned previously ( per pt he has appointment)     Left Hemiparesis from Old CVA POA    Chronic Pain syndrome on maintenance Methadone at [address] [place] [address] 65 mg daily     Hyperlipidemia, not on meds at home     Gout arthritis, cont allopurinol     Code Status: Full    Surrogate Decision Maker: sister Zaheer Patches # [phone]    DVT Prophylaxis: SQ Lovenox    Baseline: lives alone, ambulating with cane    Body mass index is 27.44 kg/(m^2). Recommended Disposition:   D/w pt dc planning today: awaiting debridement today then once OK with podiatry can be dc      Subjective:     Chief Complaint / Reason for Physician Visit: left foot cellulitis / HTN   \"my foot is still killing me\"  No other complaints  Social situation at discharge is unclear  Discussed with RN events overnight. Review of Systems:  Symptom Y/N Comments  Symptom Y/N Comments   Fever/Chills n   Chest Pain n    Poor Appetite    Edema     Cough    Abdominal Pain n    Sputum    Joint Pain     SOB/BECK yn   Pruritis/Rash     Nausea/vomit n   Tolerating PT/OT y    Diarrhea n   Tolerating Diet     Constipation n   Other       Could NOT obtain due to:      Objective:     VITALS:   Last 24hrs VS reviewed since prior progress note. Most recent are:  Patient Vitals for the past 24 hrs:   Temp Pulse Resp BP SpO2   12/31/17 1546 98.3 °F (36.8 °C) (!) 57 16 (!) 157/91 97 %   12/31/17 1041 98.5 °F (36.9 °C) (!) 50 16 151/81 97 %   12/31/17 0645 98.5 °F (36.9 °C) (!) 52 16 136/80 100 %   12/31/17 0202 98.7 °F (37.1 °C) (!) 54 18 122/69 97 %   12/30/17 2213 98.7 °F (37.1 °C) (!) 57 18 142/83 97 %       Intake/Output Summary (Last 24 hours) at 12/31/17 1847  Last data filed at 12/31/17 1428   Gross per 24 hour   Intake              840 ml   Output              800 ml   Net               40 ml        PHYSICAL EXAM:  General: WD, WN. Alert, cooperative, no acute distress    EENT:  EOMI. Anicteric sclerae. MMM  Resp:  CTA bilaterally, no wheezing or rales.   No accessory muscle use  CV:  Regular  rhythm,  R LE swelling/erythema - resolved; L foot swelling better     GI:  Soft, Non distended, Non tender.  +Bowel sounds  Neurologic:  Alert and oriented X 3, normal speech,   Psych:   Good insight. Not anxious nor agitated  Skin:  No rashes. No jaundice. Left foot is bandaged post-op                                                                        12/22 12/26              Reviewed most current lab test results and cultures  YES  Reviewed most current radiology test results   YES  Review and summation of old records today    NO  Reviewed patient's current orders and MAR    YES  PMH/SH reviewed - no change compared to H&P  ________________________________________________________________________  Care Plan discussed with:    Comments   Patient y    Family      RN y    Care Manager     Consultant                        Multidiciplinary team rounds were held today with , nursing, pharmacist and clinical coordinator. Patient's plan of care was discussed; medications were reviewed and discharge planning was addressed. _______________________________________________________________________  Total NON critical care TIME:  25 Minutes    Total CRITICAL CARE TIME Spent:   Minutes non procedure based      Comments   >50% of visit spent in counseling and coordination of care     ________________________________________________________________________  Risa Flores MD     Procedures: see electronic medical records for all procedures/Xrays and details which were not copied into this note but were reviewed prior to creation of Plan. LABS:  I reviewed today's most current labs and imaging studies.   Pertinent labs include:  Recent Labs      12/31/17   0618  12/30/17   0237  12/29/17   0359   WBC  7.9  9.0  7.8   HGB  11.5*  11.9*  11.6*   HCT  35.7*  36.4*  36.9   PLT  350  365  334     Recent Labs      12/31/17   0618  12/30/17   0237  12/29/17   0359   NA  137  136  137   K  4.2  4.0  4.1   CL  103  102  100   CO2  29  28  31   GLU  102*  105*  93   BUN  13  13  13   CREA  0.61*  0.70  0.69*   CA  8.8  8.6  9.0   MG   --   2.0  2.1   PHOS   --   4.9*  4.7       Signed: Risa Flores MD

## 2017-12-31 NOTE — ROUTINE PROCESS
Bedside and Verbal shift change report given to Michelle Macias (gen-surg) (oncoming nurse) by Marlene Rand RN (offgoing nurse). Report included the following information SBAR, Kardex, OR Summary, Procedure Summary, Intake/Output, MAR, Accordion, Recent Results, Med Rec Status and Cardiac Rhythm NSR.

## 2017-12-31 NOTE — PROGRESS NOTES
Problem: Falls - Risk of  Goal: *Absence of Falls  Document Jordon Fall Risk and appropriate interventions in the flowsheet.    Outcome: Progressing Towards Goal  Fall Risk Interventions:  Mobility Interventions: Assess mobility with egress test, Mechanical lift, Communicate number of staff needed for ambulation/transfer, OT consult for ADLs, Patient to call before getting OOB, PT Consult for mobility concerns, PT Consult for assist device competence, Strengthening exercises (ROM-active/passive), Utilize walker, cane, or other assitive device, Utilize gait belt for transfers/ambulation    Mentation Interventions: Adequate sleep, hydration, pain control, Evaluate medications/consider consulting pharmacy, Update white board, Toileting rounds, Room close to nurse's station, Increase mobility    Medication Interventions: Assess postural VS orthostatic hypotension, Evaluate medications/consider consulting pharmacy, Patient to call before getting OOB, Teach patient to arise slowly, Utilize gait belt for transfers/ambulation    Elimination Interventions: Call light in reach, Urinal in reach, Toileting schedule/hourly rounds, Toilet paper/wipes in reach, Patient to call for help with toileting needs, Elevated toilet seat

## 2018-01-01 LAB
ANION GAP SERPL CALC-SCNC: 5 MMOL/L (ref 5–15)
BUN SERPL-MCNC: 12 MG/DL (ref 6–20)
BUN/CREAT SERPL: 18 (ref 12–20)
CALCIUM SERPL-MCNC: 8.6 MG/DL (ref 8.5–10.1)
CHLORIDE SERPL-SCNC: 103 MMOL/L (ref 97–108)
CO2 SERPL-SCNC: 30 MMOL/L (ref 21–32)
CREAT SERPL-MCNC: 0.67 MG/DL (ref 0.7–1.3)
GLUCOSE SERPL-MCNC: 116 MG/DL (ref 65–100)
POTASSIUM SERPL-SCNC: 4 MMOL/L (ref 3.5–5.1)
SODIUM SERPL-SCNC: 138 MMOL/L (ref 136–145)

## 2018-01-01 PROCEDURE — 74011250637 HC RX REV CODE- 250/637: Performed by: INTERNAL MEDICINE

## 2018-01-01 PROCEDURE — 74011250637 HC RX REV CODE- 250/637: Performed by: HOSPITALIST

## 2018-01-01 PROCEDURE — 74011250636 HC RX REV CODE- 250/636: Performed by: INTERNAL MEDICINE

## 2018-01-01 PROCEDURE — 74011000258 HC RX REV CODE- 258: Performed by: INTERNAL MEDICINE

## 2018-01-01 PROCEDURE — 97530 THERAPEUTIC ACTIVITIES: CPT

## 2018-01-01 PROCEDURE — 65660000000 HC RM CCU STEPDOWN

## 2018-01-01 PROCEDURE — 36415 COLL VENOUS BLD VENIPUNCTURE: CPT | Performed by: INTERNAL MEDICINE

## 2018-01-01 PROCEDURE — 80048 BASIC METABOLIC PNL TOTAL CA: CPT | Performed by: INTERNAL MEDICINE

## 2018-01-01 PROCEDURE — 74011250637 HC RX REV CODE- 250/637: Performed by: NURSE PRACTITIONER

## 2018-01-01 RX ORDER — AMLODIPINE BESYLATE 5 MG/1
5 TABLET ORAL DAILY
Status: DISCONTINUED | OUTPATIENT
Start: 2018-01-02 | End: 2018-01-11

## 2018-01-01 RX ORDER — HYDRALAZINE HYDROCHLORIDE 20 MG/ML
20 INJECTION INTRAMUSCULAR; INTRAVENOUS
Status: DISCONTINUED | OUTPATIENT
Start: 2018-01-01 | End: 2018-01-13 | Stop reason: HOSPADM

## 2018-01-01 RX ADMIN — SODIUM CHLORIDE: 900 INJECTION, SOLUTION INTRAVENOUS at 16:12

## 2018-01-01 RX ADMIN — SODIUM CHLORIDE: 900 INJECTION, SOLUTION INTRAVENOUS at 05:48

## 2018-01-01 RX ADMIN — HYDROMORPHONE HYDROCHLORIDE 8 MG: 2 TABLET ORAL at 17:48

## 2018-01-01 RX ADMIN — ASPIRIN 81 MG 81 MG: 81 TABLET ORAL at 09:38

## 2018-01-01 RX ADMIN — HYDROMORPHONE HYDROCHLORIDE 8 MG: 2 TABLET ORAL at 09:37

## 2018-01-01 RX ADMIN — SODIUM CHLORIDE: 900 INJECTION, SOLUTION INTRAVENOUS at 21:38

## 2018-01-01 RX ADMIN — Medication 1 CAPSULE: at 09:38

## 2018-01-01 RX ADMIN — LEVOTHYROXINE SODIUM 75 MCG: 75 TABLET ORAL at 06:36

## 2018-01-01 RX ADMIN — FAMOTIDINE 20 MG: 20 TABLET ORAL at 09:39

## 2018-01-01 RX ADMIN — METHADONE HYDROCHLORIDE 80 MG: 10 TABLET ORAL at 09:39

## 2018-01-01 RX ADMIN — HYDROMORPHONE HYDROCHLORIDE 8 MG: 2 TABLET ORAL at 21:37

## 2018-01-01 RX ADMIN — LISINOPRIL 40 MG: 20 TABLET ORAL at 09:38

## 2018-01-01 RX ADMIN — POLYETHYLENE GLYCOL 3350 17 G: 17 POWDER, FOR SOLUTION ORAL at 09:36

## 2018-01-01 RX ADMIN — HYDROMORPHONE HYDROCHLORIDE 8 MG: 2 TABLET ORAL at 05:44

## 2018-01-01 RX ADMIN — ENOXAPARIN SODIUM 40 MG: 40 INJECTION SUBCUTANEOUS at 17:55

## 2018-01-01 RX ADMIN — DOCUSATE SODIUM 100 MG: 100 CAPSULE, LIQUID FILLED ORAL at 09:38

## 2018-01-01 RX ADMIN — DOCUSATE SODIUM 100 MG: 100 CAPSULE, LIQUID FILLED ORAL at 17:48

## 2018-01-01 RX ADMIN — COLCHICINE 0.6 MG: 0.6 TABLET, FILM COATED ORAL at 11:08

## 2018-01-01 RX ADMIN — COLCHICINE 0.6 MG: 0.6 TABLET, FILM COATED ORAL at 17:48

## 2018-01-01 RX ADMIN — Medication 10 ML: at 05:46

## 2018-01-01 RX ADMIN — FAMOTIDINE 20 MG: 20 TABLET ORAL at 17:48

## 2018-01-01 RX ADMIN — HYDROMORPHONE HYDROCHLORIDE 8 MG: 2 TABLET ORAL at 01:40

## 2018-01-01 RX ADMIN — HYDROMORPHONE HYDROCHLORIDE 8 MG: 2 TABLET ORAL at 13:34

## 2018-01-01 RX ADMIN — Medication 10 ML: at 13:36

## 2018-01-01 NOTE — PROGRESS NOTES
Problem: Mobility Impaired (Adult and Pediatric)  Goal: *Acute Goals and Plan of Care (Insert Text)  Physical Therapy Goals  Initiated 12/28/2017  1. Patient will move from supine to sit and sit to supine  and roll side to side in bed with independence within 7 day(s). 2.  Patient will transfer from bed to chair and chair to bed with modified independence using the least restrictive device within 7 day(s). 3.  Patient will perform sit to stand with modified independence within 7 day(s). 4.  Patient will ambulate with supervision/set-up for 50 feet with the least restrictive device within 7 day(s). physical Therapy TREATMENT  Patient: Lianet Alberto (80 y.o. male)  Date: 1/1/2018  Diagnosis: Bilateral cellulitis of lower leg  HTN (hypertension)  necrotic tissue left foot Bilateral cellulitis of lower leg  Procedure(s) (LRB):  INCISION AND DRAINAGE LEFT FOOT (Left) 3 Days Post-Op  Precautions:  no new WB orders LLE since debridement    ASSESSMENT:  Pt received in bed, cleared by nursing. Pt agreeable but states he needs to use BSC. Pt CGA to come to EOB. Refuses use of gait belt for transfer and prefers no gown, just underwear at this time. Transfer to stand (partial) and to [facility] is with mod A of 2. LLE in flexion with c/o pain but limited to no WB during transfer pivoting on RLE. Pt requested to sit on BSC. Call bell in reach, nursing and aide notified. Note: after toileting, assisted nursing tech with transfer BSC to bed. Mod A of 2. Min A of 1 for LLE into bed. Pt left with RN and tech for care. At this time, pt would require SNF rehab prior to return home as he would be unable to care for himself at this time. Will need final clarification on WB of LLE for progress to amb attempt, however, in any case regardless of WB status, pt will need SNF rehab to progress his functional mobility to max function for disposition.   Progression toward goals:  []    Improving appropriately and progressing toward goals  [x]    Improving slowly and progressing toward goals  []    Not making progress toward goals and plan of care will be adjusted     PLAN:  Patient continues to benefit from skilled intervention to address the above impairments. Continue treatment per established plan of care. Discharge Recommendations:  Skilled Nursing Facility  Further Equipment Recommendations for Discharge:  TBD at SNF     SUBJECTIVE:   Patient stated I need privacy to use the [facility].     OBJECTIVE DATA SUMMARY:   Critical Behavior:  Neurologic State: Alert, Appropriate for age  Orientation Level: Oriented X4  Cognition: Appropriate for age attention/concentration, Appropriate safety awareness, Appropriate decision making  Safety/Judgement: Awareness of environment, Fall prevention, Insight into deficits  Functional Mobility Training:  Bed Mobility:     Supine to Sit: Contact guard assistance              Transfers:  Sit to Stand: Moderate assistance;Assist x2  Stand to Sit: Moderate assistance;Assist x2        Bed to Chair: Moderate assistance;Assist x2; Other (comment) (bed to [facility])                    Balance: Good sitting at EOB; poor standing with mod A of 2     Ambulation/Gait Training: unable to progress at this time                                           Pain:  Pain Scale 1: Numeric (0 - 10)  Pain Intensity 1: 10  Pain Location 1: Foot  Pain Orientation 1: Left  Pain Description 1: Aching  Pain Intervention(s) 1: Medication (see MAR)  Activity Tolerance:   No acute distress    Please refer to the flowsheet for vital signs taken during this treatment.   After treatment:   [x]    Patient left in no apparent distress sitting up, BSC, nursing aware and checking on pt  []    Patient left in no apparent distress in bed  [x]    Call bell left within reach  [x]    Nursing notified  []    Caregiver present  []    Bed alarm activated    COMMUNICATION/COLLABORATION:   The patients plan of care was discussed with: Registered Nurse    Julien Ramsey [place], PT   Time Calculation: 16 mins

## 2018-01-01 NOTE — PROGRESS NOTES
Hospitalist Progress Note    NAME: Shante Zeng   :  1965       Assessment / Plan:  LE cellulitis POA with purulent drainage  Chronic non healing L Foot ulcer/wound with cellulitis POA  zosyn day 12 ( )  BC NTD  wound cultures: pseudomonas. Enterococcus and Grp B strep in thio broth  D/C on Cipro 750 mg po bid, Augmentin 875 mg bid x 10 days. MRI left foot :No fluid collection/ osteomyelitis  ain management: appreciated palliative care team greatly  1. Pain level should be based on observation, not patient rating. 2. Increase the Methadone to 80mg/day. 3. Increase the Dilaudid to 8mg PO q. 4 hours prn.  4. Discontinue the IV Dilaudid. 5. When pt is ready for discharge, do not provide any opioid prescriptions. The methadone will prevent withdrawal symptoms when the dilaudid is stopped.    Primary pain specialist: Dr. Binh Mo  Vascular surgery and podiatry help appreciated  Pt seen twice by Dr Andi Cardenas  and as second opinion Dr Sherman Marsh   [place] to have adequate left foot arterial profusion to undergo debridement of tissue  OR for debridement 2017  LE dopplers negative for DVT  Cultures with rare diptheroids and pseudomonas from thio broth  PT recommends SNF, start D/C planning in Am     Essential HTN POA/ sinus bradycardia   monitor on tele   Increased home lisinopril this admission, watch cr/K with it   BP still uncontrolled, add norvasc  IV hydralazine PRN    Bladder mass   Per pt he was diagnosed with a bladder mass at [facility] per pt  Unable to get records  follow with urology OP as planned previously ( per pt he has appointment)     Left Hemiparesis from Old CVA POA    Chronic Pain syndrome on maintenance Methadone at [address] [place] [address] 65 mg daily     Hyperlipidemia, not on meds at home     Gout arthritis, cont allopurinol     Code Status: Full    Surrogate Decision Maker: sister Maurilio Jose # [phone]    DVT Prophylaxis: SQ Lovenox    Baseline: lives alone, ambulating with cane    Body mass index is 27.44 kg/(m^2). Recommended Disposition:   D/w pt dc planning today: awaiting debridement today then once OK with podiatry can be dc      Subjective:     Chief Complaint / Reason for Physician Visit: left foot cellulitis / HTN   \"my foot is still killing me\"  No other complaints  Discussed with RN events overnight. Review of Systems:  Symptom Y/N Comments  Symptom Y/N Comments   Fever/Chills n   Chest Pain n    Poor Appetite    Edema     Cough    Abdominal Pain n    Sputum    Joint Pain     SOB/BECK yn   Pruritis/Rash     Nausea/vomit n   Tolerating PT/OT y    Diarrhea n   Tolerating Diet     Constipation n   Other       Could NOT obtain due to:      Objective:     VITALS:   Last 24hrs VS reviewed since prior progress note. Most recent are:  Patient Vitals for the past 24 hrs:   Temp Pulse Resp BP SpO2   01/01/18 1802 97.8 °F (36.6 °C) 64 18 (!) 152/100 98 %   01/01/18 1409 97.7 °F (36.5 °C) (!) 56 18 (!) 131/95 98 %   01/01/18 0955 98.2 °F (36.8 °C) (!) 54 18 143/79 98 %   01/01/18 0726 98.3 °F (36.8 °C) (!) 56 20 142/82 97 %   01/01/18 0315 97.4 °F (36.3 °C) 66 20 (!) 155/94 98 %   12/31/17 2321 97.5 °F (36.4 °C) 60 20 146/90 96 %       Intake/Output Summary (Last 24 hours) at 01/01/18 1813  Last data filed at 01/01/18 0941   Gross per 24 hour   Intake                0 ml   Output             2400 ml   Net            -2400 ml        PHYSICAL EXAM:  General: WD, WN. Alert, cooperative, no acute distress    EENT:  EOMI. Anicteric sclerae. MMM  Resp:  CTA bilaterally, no wheezing or rales. No accessory muscle use  CV:  Regular  rhythm,  R LE swelling/erythema - resolved; L foot swelling better     GI:  Soft, Non distended, Non tender.  +Bowel sounds  Neurologic:  Alert and oriented X 3, normal speech,   Psych:   Good insight. Not anxious nor agitated  Skin:  No rashes. No jaundice.  Left foot is bandaged post-op 12/22 12/26              Reviewed most current lab test results and cultures  YES  Reviewed most current radiology test results   YES  Review and summation of old records today    NO  Reviewed patient's current orders and MAR    YES  PMH/SH reviewed - no change compared to H&P  ________________________________________________________________________  Care Plan discussed with:    Comments   Patient y    Family      RN y    Care Manager     Consultant                        Multidiciplinary team rounds were held today with , nursing, pharmacist and clinical coordinator. Patient's plan of care was discussed; medications were reviewed and discharge planning was addressed. _______________________________________________________________________  Total NON critical care TIME:  25 Minutes    Total CRITICAL CARE TIME Spent:   Minutes non procedure based      Comments   >50% of visit spent in counseling and coordination of care     ________________________________________________________________________  Trixie Tran MD     Procedures: see electronic medical records for all procedures/Xrays and details which were not copied into this note but were reviewed prior to creation of Plan. LABS:  I reviewed today's most current labs and imaging studies.   Pertinent labs include:  Recent Labs      12/31/17   0618  12/30/17   0237   WBC  7.9  9.0   HGB  11.5*  11.9*   HCT  35.7*  36.4*   PLT  350  365     Recent Labs      01/01/18   0648  12/31/17   0618  12/30/17   0237   NA  138  137  136   K  4.0  4.2  4.0   CL  103  103  102   CO2  30  29  28   GLU  116*  102*  105*   BUN  12  13  13   CREA  0.67*  0.61*  0.70   CA  8.6  8.8  8.6   MG   --    --   2.0   PHOS   --    --   4.9*       Signed: Trixie Tran MD

## 2018-01-01 NOTE — PROGRESS NOTES
Bedside and Verbal shift change report given to [facility] (oncoming nurse) by Elvira Cedillo RN (offgoing nurse). Report included the following information SBAR, Kardex, OR Summary, Procedure Summary, Intake/Output, MAR and Recent Results.

## 2018-01-02 LAB
ANION GAP SERPL CALC-SCNC: 8 MMOL/L (ref 5–15)
BACTERIA SPEC CULT: ABNORMAL
BUN SERPL-MCNC: 11 MG/DL (ref 6–20)
BUN/CREAT SERPL: 19 (ref 12–20)
CALCIUM SERPL-MCNC: 8.6 MG/DL (ref 8.5–10.1)
CHLORIDE SERPL-SCNC: 103 MMOL/L (ref 97–108)
CO2 SERPL-SCNC: 24 MMOL/L (ref 21–32)
CREAT SERPL-MCNC: 0.57 MG/DL (ref 0.7–1.3)
GLUCOSE SERPL-MCNC: 93 MG/DL (ref 65–100)
GRAM STN SPEC: ABNORMAL
GRAM STN SPEC: ABNORMAL
POTASSIUM SERPL-SCNC: 4.1 MMOL/L (ref 3.5–5.1)
SERVICE CMNT-IMP: ABNORMAL
SERVICE CMNT-IMP: ABNORMAL
SODIUM SERPL-SCNC: 135 MMOL/L (ref 136–145)

## 2018-01-02 PROCEDURE — 74011250637 HC RX REV CODE- 250/637: Performed by: INTERNAL MEDICINE

## 2018-01-02 PROCEDURE — 77030009526 HC GEL CARSYN MDII -A

## 2018-01-02 PROCEDURE — 80048 BASIC METABOLIC PNL TOTAL CA: CPT | Performed by: INTERNAL MEDICINE

## 2018-01-02 PROCEDURE — 74011000258 HC RX REV CODE- 258: Performed by: INTERNAL MEDICINE

## 2018-01-02 PROCEDURE — 74011250637 HC RX REV CODE- 250/637: Performed by: NURSE PRACTITIONER

## 2018-01-02 PROCEDURE — 77030019607 HC DSG BURN S&N -A

## 2018-01-02 PROCEDURE — 36415 COLL VENOUS BLD VENIPUNCTURE: CPT | Performed by: INTERNAL MEDICINE

## 2018-01-02 PROCEDURE — 74011000250 HC RX REV CODE- 250: Performed by: INTERNAL MEDICINE

## 2018-01-02 PROCEDURE — 74011250636 HC RX REV CODE- 250/636: Performed by: INTERNAL MEDICINE

## 2018-01-02 PROCEDURE — 74011250637 HC RX REV CODE- 250/637: Performed by: HOSPITALIST

## 2018-01-02 PROCEDURE — 65660000000 HC RM CCU STEPDOWN

## 2018-01-02 RX ORDER — LIDOCAINE 40 MG/G
CREAM TOPICAL
Status: COMPLETED | OUTPATIENT
Start: 2018-01-02 | End: 2018-01-02

## 2018-01-02 RX ORDER — MORPHINE SULFATE 2 MG/ML
2 INJECTION, SOLUTION INTRAMUSCULAR; INTRAVENOUS
Status: DISCONTINUED | OUTPATIENT
Start: 2018-01-02 | End: 2018-01-03

## 2018-01-02 RX ADMIN — HYDROMORPHONE HYDROCHLORIDE 8 MG: 2 TABLET ORAL at 19:14

## 2018-01-02 RX ADMIN — LEVOTHYROXINE SODIUM 75 MCG: 75 TABLET ORAL at 09:28

## 2018-01-02 RX ADMIN — ASPIRIN 81 MG 81 MG: 81 TABLET ORAL at 09:29

## 2018-01-02 RX ADMIN — LIDOCAINE: 40 CREAM TOPICAL at 17:36

## 2018-01-02 RX ADMIN — SODIUM CHLORIDE: 900 INJECTION, SOLUTION INTRAVENOUS at 06:22

## 2018-01-02 RX ADMIN — Medication 1 CAPSULE: at 09:29

## 2018-01-02 RX ADMIN — FAMOTIDINE 20 MG: 20 TABLET ORAL at 09:28

## 2018-01-02 RX ADMIN — ENOXAPARIN SODIUM 40 MG: 40 INJECTION SUBCUTANEOUS at 17:36

## 2018-01-02 RX ADMIN — DOCUSATE SODIUM 100 MG: 100 CAPSULE, LIQUID FILLED ORAL at 17:37

## 2018-01-02 RX ADMIN — HYDROMORPHONE HYDROCHLORIDE 8 MG: 2 TABLET ORAL at 01:49

## 2018-01-02 RX ADMIN — HYDROMORPHONE HYDROCHLORIDE 8 MG: 2 TABLET ORAL at 06:21

## 2018-01-02 RX ADMIN — FAMOTIDINE 20 MG: 20 TABLET ORAL at 17:37

## 2018-01-02 RX ADMIN — DOCUSATE SODIUM 100 MG: 100 CAPSULE, LIQUID FILLED ORAL at 09:29

## 2018-01-02 RX ADMIN — COLCHICINE 0.6 MG: 0.6 TABLET, FILM COATED ORAL at 17:37

## 2018-01-02 RX ADMIN — LISINOPRIL 40 MG: 20 TABLET ORAL at 09:29

## 2018-01-02 RX ADMIN — SODIUM CHLORIDE: 900 INJECTION, SOLUTION INTRAVENOUS at 14:18

## 2018-01-02 RX ADMIN — AMLODIPINE BESYLATE 5 MG: 5 TABLET ORAL at 09:29

## 2018-01-02 RX ADMIN — HYDROMORPHONE HYDROCHLORIDE 8 MG: 2 TABLET ORAL at 13:50

## 2018-01-02 RX ADMIN — METHADONE HYDROCHLORIDE 80 MG: 10 TABLET ORAL at 09:29

## 2018-01-02 RX ADMIN — ALLOPURINOL 100 MG: 100 TABLET ORAL at 09:29

## 2018-01-02 RX ADMIN — Medication 10 ML: at 01:50

## 2018-01-02 RX ADMIN — HYDROMORPHONE HYDROCHLORIDE 8 MG: 2 TABLET ORAL at 23:01

## 2018-01-02 RX ADMIN — SODIUM CHLORIDE: 900 INJECTION, SOLUTION INTRAVENOUS at 21:42

## 2018-01-02 RX ADMIN — Medication 10 ML: at 13:41

## 2018-01-02 RX ADMIN — Medication 10 ML: at 21:42

## 2018-01-02 NOTE — ROUTINE PROCESS
Bedside and Verbal shift change report given to Monika Ortiz (oncoming nurse) by Warren Unger RN (offgoing nurse). Report included the following information SBAR, Kardex, ED Summary, OR Summary, Procedure Summary, Intake/Output, MAR, Accordion, Recent Results, Med Rec Status and Cardiac Rhythm NSR/ margaux.

## 2018-01-02 NOTE — ROUTINE PROCESS
Bedside and Verbal shift change report given to Sukh Munson RN (oncoming nurse) by Cris Hills RN (offgoing nurse). Report included the following information SBAR, Kardex, Intake/Output, MAR, Recent Results and Med Rec Status.

## 2018-01-02 NOTE — PROGRESS NOTES
SHERRI met pt and discussed his discharge plan however pt was very upset to talk about his discharge plan. SHERRI spoke with [facility] [phone] pt's girl friend. She said she is living with him for 8 years and giving care. [facility] said she is going to take pt home once he get ready to discharge. 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 PT recommended for SNF his care however pt is doesn't have SNF benefits. SHERRI notified MD to find pt's weigt bearning status and order. MD said he will communicate with pt's podiatrist.Pt's has a 20 y/o daughter Kasey Gibbs [phone].     Mariah Oconnell MSW  ED Case Manager   Nof [phone]

## 2018-01-02 NOTE — WOUND CARE
Wound Care Consult: Chart reviewed and patient assessed for his wound for a second time - this time after his debridement. The wound care was difficult yesterday as the nurse attempted to remove the dressing and it completely stuck to the wound bed. Patient would not allow the wound care to continue without total numbing of the wound. This wound is on the paralyzed side which is extremely sensitive to touch of any kind. The old dressing was removed using Lidocaine 4% applied as close to the wound as possible around the dressing. Waited 2 minutes and then poured peroxide on the gauze dressing that was stuck to the wound. The dressing came off easily at that time. Assessment and treatment today: The wound bed was cleansed with Carraklenz spray and wiped with gauze to remove the old drainage and wound debris. The wound bed has about 30% wet slough on it with some granulation buds forming. The drainage is chronically wet between the toes and the toes have to be  with a dressing between them. Wound care orders specify this now. New orders: May use up the Lidocaine to control pain while here in the hospital. Daily wound care for the left foot wound: remove the old dressing and cleanse the wound with Carraklenz spray and wipe the wound bed well with gauze, including between the toes to keep them clean and dry. Apply Xeroform gauze on top of the wound and winding between the toes and then cover with a bulky amount of gauze on each side of the toes (sandwiching the toes). Wrap with William  To secure the dressing and then tape to secure. Per Dr. Nataly Griffith - patient may weight bare as long as he has a post op shoe. Please obtain one from the OR for this patient. Care instructions for home care was copied into the discharge instructions today.    iLlian Richmond RN, BSN, CWON ([phone])

## 2018-01-02 NOTE — DISCHARGE INSTRUCTIONS
Patient Discharge Instructions    [facility] / [id number] : 1965    Admitted 2017 Discharged: 1/3/2018         DISCHARGE DIAGNOSIS:   LE cellulitis POA with purulent drainage  Chronic non healing left foot ulcer/wound with cellulitis   Essential HTN POA/ sinus bradycardia   Bladder mass   Left sided weakness from Old CVA POA  Chronic Pain syndrome on maintenance Methadone at [facility] 65 mg daily  Gout arthritis    What to do at Home    1. Recommended diet: Cardiac Diet    2. Recommended activity: Activity as tolerated, may weight bear on left foot    3. If you experience any of the following symptoms then please call your primary care physician or return to the emergency room if you cannot get hold of your doctor:   Fevers > 100.5, chills   Nausea or vomiting, persistent diarrhea > 24 hours   Blood in stool or black stools   Chest pain or SOB  4. If you use over the counter pain relievers like acetaminophen be sure to adhere to bottle instructions. Please be aware that combining NSAIDs (like ibuprofen, naproxen, etc...; ask your pharmacist if you are unsure) with lisinopril may cause kidney failure. Follow-up Information     Follow up With Details Comments Contact Info    Adrian Norwood MD Schedule an appointment as soon as possible for a visit  [address] [place] [address] [phone] [place] [address]  [phone]      Also be dure to go straight to the [facility] after you discharge to discuss you pain medications  Keep you Urology appointment as scheduled        Daily wound care for the left foot wound: remove the old dressing and cleanse the wound with Carraklenz spray and wipe the wound bed well with gauze, including between the toes to keep them clean and dry.  Apply Xeroform gauze on top of the wound and winding between the toes and then cover with a bulky amount of gauze on each side of the toes (sandwiching the toes). Wrap with William  To secure the dressing and then tape to secure. Use the dilaudid only for severe pain mayela after dressing changes    Dilaudid and methadone may cause drowsiness, do not drive a car or drink alcohol while using      Information obtained by :  I understand that if any problems occur once I am at home I am to contact my physician. I understand and acknowledge receipt of the instructions indicated above. Physician's or R.N.'s Signature                                                                  Date/Time                                                                                                                                              Patient or Representative Signature                                                          Date/Time      Cellulitis: Care Instructions  Your Care Instructions: Items needed for wound care - 4x4's (2 packs), Xeroform gauze, William and tape. Loel Mayans supplied at the hospital (one bottle) but may need to replenish this OR May use NS to cleanse the wound once the Loel Mayans runs out. Wound care to be done daily by the Home Care Nurse: Daily wound care for the left foot wound: remove the old dressing and cleanse the wound with Carraklenz spray and wipe the wound bed well with gauze, including between the toes to keep them clean and dry. Apply Xeroform gauze on top of the wound and wind between the toes to keep them . Cover with a bulky amount of gauze on each side of the toes (sandwiching the toes). Wrap with William  To secure the dressing and then tape to secure. Replace the post op shoe. Need to follow up with Dr. Amy Huizar as indicated in the discharge instructions per care manager. Cellulitis is a skin infection.  It often occurs after a break in the skin from a scrape, cut, bite, or puncture, or after a rash. The doctor has checked you carefully, but problems can develop later. If you notice any problems or new symptoms, get medical treatment right away. Follow-up care is a key part of your treatment and safety. Be sure to make and go to all appointments, and call your doctor if you are having problems. It's also a good idea to know your test results and keep a list of the medicines you take. How can you care for yourself at home? · Take your antibiotics as directed. Do not stop taking them just because you feel better. You need to take the full course of antibiotics. · Prop up the infected area on pillows to reduce pain and swelling. Try to keep the area above the level of your heart as often as you can. · If your doctor told you how to care for your wound, follow your doctor's instructions. If you did not get instructions, follow this general advice:  ¨ Wash the wound with clean water 2 times a day. Don't use hydrogen peroxide or alcohol, which can slow healing. ¨ You may cover the wound with a thin layer of petroleum jelly, such as Vaseline, and a nonstick bandage. ¨ Apply more petroleum jelly and replace the bandage as needed. · Be safe with medicines. Take pain medicines exactly as directed. ¨ If the doctor gave you a prescription medicine for pain, take it as prescribed. ¨ If you are not taking a prescription pain medicine, ask your doctor if you can take an over-the-counter medicine. To prevent cellulitis in the future  · Try to prevent cuts, scrapes, or other injuries to your skin. Cellulitis most often occurs where there is a break in the skin. · If you get a scrape, cut, mild burn, or bite, wash the wound with clean water as soon as you can to help avoid infection. Don't use hydrogen peroxide or alcohol, which can slow healing. · If you have swelling in your legs (edema), support stockings and good skin care may help prevent leg sores and cellulitis.   · Take care of your feet, especially if you have diabetes or other conditions that increase the risk of infection. Wear shoes and socks. Do not go barefoot. If you have athlete's foot or other skin problems on your feet, talk to your doctor about how to treat them. When should you call for help? Call your doctor now or seek immediate medical care if:  ? · You have signs that your infection is getting worse, such as:  ¨ Increased pain, swelling, warmth, or redness. ¨ Red streaks leading from the area. ¨ Pus draining from the area. ¨ A fever. ? · You get a rash. ? Watch closely for changes in your health, and be sure to contact your doctor if:  ? · You are not getting better after 1 day (24 hours). ? · You do not get better as expected. Where can you learn more? Go to http://joan-arvin.info/. Ronna Osorio in the search box to learn more about \"Cellulitis: Care Instructions. \"  Current as of: October 13, 2016  Content Version: 11.4  © 1188-3448 [employer]. Care instructions adapted under license by [employer] (which disclaims liability or warranty for this information). If you have questions about a medical condition or this instruction, always ask your healthcare professional. [address] any warranty or liability for your use of this information.

## 2018-01-02 NOTE — PROGRESS NOTES
Hospitalist Progress Note    NAME: Roland Roles   :  1965       Assessment / Plan:  LE cellulitis POA with purulent drainage  Chronic non healing L Foot ulcer/wound with cellulitis POA  zosyn day 12  BC NTD  wound cultures: pseudomonas. Enterococcus and Grp B strep in thio broth  D/C on Cipro 750 mg po bid, Augmentin 875 mg bid x 10 days. MRI left foot :No fluid collection/ osteomyelitis  ain management: appreciated palliative care team greatly  1. Pain level should be based on observation, not patient rating. 2. Increase the Methadone to 80mg/day. 3. Increase the Dilaudid to 8mg PO q. 4 hours prn.  4. Discontinue the IV Dilaudid. 5. When pt is ready for discharge, do not provide any opioid prescriptions. The methadone will prevent withdrawal symptoms when the dilaudid is stopped.    Primary pain specialist: Dr. Chris Avelar  Vascular surgery and podiatry help appreciated  Pt seen twice by Dr Luis Guerrero  and as second opinion Dr Dakota Tovar   Felt to have adequate left foot arterial profusion to undergo debridement of tissue  OR for debridement 2017  LE dopplers negative for DVT  Cultures with rare diptheroids and pseudomonas from thio broth  Medically ready for discharge  CM working on safe D/C plan, contacting podiatry to get weight bearing status  Hopefully discharge in Am     Essential HTN POA/ sinus bradycardia   monitor on tele   Increased home lisinopril this admission, watch cr/K with it   BP still uncontrolled, add norvasc  IV hydralazine PRN    Bladder mass   Per pt he was diagnosed with a bladder mass at [facility] per pt  Unable to get records  follow with urology OP as planned previously ( per pt he has appointment)     Left Hemiparesis from Old CVA POA    Chronic Pain syndrome on maintenance Methadone at [address] [place] [address] 65 mg daily     Hyperlipidemia, not on meds at home     Gout arthritis, cont allopurinol     Code Status: Full    Surrogate Decision Maker: sister Caterina Co # [phone]    DVT Prophylaxis: SQ Lovenox    Baseline: lives alone, ambulating with cane    Body mass index is 27.44 kg/(m^2). Recommended Disposition:   D/w pt dc planning today: awaiting debridement today then once OK with podiatry can be dc      Subjective:     Chief Complaint / Reason for Physician Visit: left foot cellulitis / HTN   \"my foot is better now that they changed the dressing\"  No other complaints, no diarrhea  Discussed with RN events overnight. Review of Systems:  Symptom Y/N Comments  Symptom Y/N Comments   Fever/Chills n   Chest Pain n    Poor Appetite    Edema     Cough    Abdominal Pain n    Sputum    Joint Pain     SOB/BECK n   Pruritis/Rash     Nausea/vomit n   Tolerating PT/OT y    Diarrhea n   Tolerating Diet     Constipation n   Other       Could NOT obtain due to:      Objective:     VITALS:   Last 24hrs VS reviewed since prior progress note. Most recent are:  Patient Vitals for the past 24 hrs:   Temp Pulse Resp BP SpO2   01/02/18 1505 97.8 °F (36.6 °C) (!) 57 18 128/78 96 %   01/02/18 1120 97.9 °F (36.6 °C) 69 18 127/90 97 %   01/02/18 0705 - - - - 98 %   01/02/18 0636 98 °F (36.7 °C) (!) 58 16 147/74 98 %   01/01/18 2218 97.5 °F (36.4 °C) (!) 52 18 119/82 96 %   01/01/18 1823 - 62 - 153/88 -   01/01/18 1802 97.8 °F (36.6 °C) 64 18 (!) 152/100 98 %       Intake/Output Summary (Last 24 hours) at 01/02/18 1720  Last data filed at 01/02/18 1432   Gross per 24 hour   Intake              960 ml   Output             1200 ml   Net             -240 ml        PHYSICAL EXAM:  General: WD, WN. Alert, cooperative, no acute distress    EENT:  EOMI. Anicteric sclerae. MMM  Resp:  CTA bilaterally, no wheezing or rales.   No accessory muscle use  CV:  Regular  rhythm,  R LE swelling/erythema - resolved; L foot swelling better     GI:  Soft, Non distended, Non tender.  +Bowel sounds  Neurologic:  Alert and oriented X 3, normal speech,   Psych:   Good insight. Not anxious nor agitated  Skin:  No rashes. No jaundice. Left foot is bandaged post-op                                                                        12/22 12/26              Reviewed most current lab test results and cultures  YES  Reviewed most current radiology test results   YES  Review and summation of old records today    NO  Reviewed patient's current orders and MAR    YES  PMH/SH reviewed - no change compared to H&P  ________________________________________________________________________  Care Plan discussed with:    Comments   Patient y    Family      RN y    Care Manager y    Consultant                        Multidiciplinary team rounds were held today with , nursing, pharmacist and clinical coordinator. Patient's plan of care was discussed; medications were reviewed and discharge planning was addressed. _______________________________________________________________________  Total NON critical care TIME:  25 Minutes    Total CRITICAL CARE TIME Spent:   Minutes non procedure based      Comments   >50% of visit spent in counseling and coordination of care     ________________________________________________________________________  Sandy Saucedo MD     Procedures: see electronic medical records for all procedures/Xrays and details which were not copied into this note but were reviewed prior to creation of Plan. LABS:  I reviewed today's most current labs and imaging studies.   Pertinent labs include:  Recent Labs      12/31/17   0618   WBC  7.9   HGB  11.5*   HCT  35.7*   PLT  350     Recent Labs      01/02/18   0650  01/01/18   0648  12/31/17   0618   NA  135*  138  137   K  4.1  4.0  4.2   CL  103  103  103   CO2  24  30  29   GLU  93  116*  102*   BUN  11  12  13   CREA  0.57*  0.67*  0.61*   CA  8.6  8.6  8.8       Signed: Sandy Saucedo MD

## 2018-01-02 NOTE — PROGRESS NOTES
Infectious Disease Progress Note        IMPRESSION:   1. Chronic ulcer dorsum of left foot with no evidence of Osteomyelitis  2. B/l  Lower extremity cellulitis significantly improved  3. WC + for Pseudomonas, diphtheroids, thio broth only- Group B strep        PLAN:   1. Continue Pip- tazobactam IV , DC on Cipro , Augmentin  to make up total of 20 days  2. Daily wound care , elevate both LE        Subjective:     Pt seen . Complains of pain  Both LE    Review of Systems:  A comprehensive review of systems was negative except for that written in the History of Present Illness. Objective:     Blood pressure 147/74, pulse (!) 58, temperature 98 °F (36.7 °C), resp. rate 16, height 6' 1\" (1.854 m), weight 94.3 kg (208 lb), SpO2 98 %. Temp (24hrs), Av.8 °F (36.6 °C), Min:97.5 °F (36.4 °C), Max:98 °F (36.7 °C)      Lines:  Peripheral IV:            Physical Exam:   General:  Cooperative, awake   Eyes:  Sclera anicteric. Pupils equally round and reactive to light. Mouth/Throat: Mucous membranes normal, oral pharynx clear   Neck: Supple   Lungs:   Clear to auscultation bilaterally, good effort   CV:  Regular rate and rhythm,no murmur, click, rub or gallop   Abdomen:   Soft, non-tender. bowel sounds normal. non-distended   Extremities: B/l lower extremity  No edema less,erythema , notwarm to touch Wound exam attempted. Adherent piece of gauze on dorsum of wound. Pt will not allow exam.Surrounding skin appears healthy.        Lymph nodes: Cervical and supraclavicular normal       Lines/Devices:  Intact, no erythema, drainage or tenderness   Psych: Alert and oriented, normal mood affect       Data Review:   CBC:   Recent Labs      17   0618   WBC  7.9   RBC  4.19   HGB  11.5*   HCT  35.7*   PLT  350   GRANS  40   LYMPH  40   EOS  7     CMP:   Recent Labs      18   0650  18   0648  17   0618   GLU  93  116*  102*   NA  135*  138  137   K  4.1  4.0  4.2   CL  103  103  103   CO2  24  30  29 BUN  11  12  13   CREA  0.57*  0.67*  0.61*   CA  8.6  8.6  8.8   AGAP  8  5  5   BUCR  19  18  21*       Studies:      Lab Results   Component Value Date/Time    Culture result: NO ANAEROBES ISOLATED 12/29/2017 06:45 PM    Culture result:  12/29/2017 06:45 PM     PSEUDOMONAS AERUGINOSA (STRAIN 1) ISOLATED FROM THIO BROTH ONLY    Culture result:  12/29/2017 06:45 PM     PSEUDOMONAS AERUGINOSA (STRAIN 2) ISOLATED FROM THIO BROTH ONLY    Culture result: RARE DIPHTHEROIDS 12/29/2017 06:45 PM    Culture result:  12/22/2017 01:30 PM     LIGHT PSEUDOMONAS AERUGINOSA (1ST STRAIN/COLONY TYPE)    Culture result:  12/22/2017 01:30 PM     LIGHT PSEUDOMONAS AERUGINOSA (2ND STRAIN/COLONY TYPE)    Culture result:  12/22/2017 01:30 PM     STREPTOCOCCI, BETA HEMOLYTIC GROUP B ISOLATED FROM THIO BROTH ONLY Penicillin and ampicillin are drugs of choice for treatment of beta-hemolytic streptococcal infections. Susceptibility testing of penicillins and beta-lactams approved by the FDA for treatment of beta-hemolytic streptococcal infections need not be performed routinely, because nonsusceptible isolates are extremely rare. CLSI 2012    Culture result: MODERATE DIPHTHEROIDS 12/22/2017 01:30 PM    Culture result:  12/22/2017 01:30 PM     ENTEROCOCCUS FAECALIS GROUP D ISOLATED FROM THIO BROTH ONLY          XR Results (most recent):    Results from Hospital Encounter encounter on 12/21/17   XR FOOT LT MIN 3 V   Narrative EXAM:  XR FOOT LT MIN 3 V    INDICATION:   Left foot wound. COMPARISON:  None. FINDINGS:  Three views of the left foot demonstrate no definite fracture or bony  destructive lesion. There is osteopenia. Study is mildly limited by  positioning. . . Impression IMPRESSION:  No acute abnormality identified. .              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 Cellulitis of lower extremity, unspecified laterality L03.119 682.6    2. 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 Moderate episode of recurrent major depressive disorder (HCC) F33.1 296.32    4. Long term current use of methadone for pain control Z79.891 V58.69    5. Physical debility R53.81 799.3        I have discussed the diagnosis with the patient and the intended plan as seen in the above orders. I have discussed medication side effects and warnings with the patient as well. Reviewed test results at length with patient    Anti-infectives:   S/p Vancomycin IV x 5 D    Pip- tazobactam IV D12  .   Neisha Ontiveros MD [id number]

## 2018-01-02 NOTE — PROGRESS NOTES
Nutrition Assessment:    INTERVENTIONS/RECOMMENDATIONS:   Continue current diet order    ASSESSMENT:   Chart reviewed, attempted to see pt however he was in the middle on a phone call, did not want to interrupt. Following pt due to LOS. Per chart review pt is eating well. Diet Order: Regular  % Eaten:  Patient Vitals for the past 72 hrs:   % Diet Eaten   12/31/17 1428 100 %   12/31/17 1041 100 %     Pertinent Medications: [x]Reviewed: colace, pepcid, lactobac,   Pertinent Labs: [x]Reviewed:   Food Allergies: [x]NKFA  []Other   Last BM:  12/30  Edema:      []RUE   []LUE   [x]RLE 1+  [x]LLE 3+      Pressure Ulcer:      [] Stage I   [] Stage II   [] Stage III   [] Stage IV      Anthropometrics: Height: 6' 1\" (185.4 cm) Weight: 94.3 kg (208 lb)    IBW (%IBW):   ( ) UBW (%UBW):   (  %)    BMI: Body mass index is 27.44 kg/(m^2). This BMI is indicative of:  []Underweight   []Normal   [x]Overweight   [] Obesity   [] Extreme Obesity (BMI>40)  Last Weight Metrics:  Weight Loss Metrics 12/21/2017 8/13/2017 1/27/2017 1/6/2017 12/13/2016 10/14/2016 9/10/2016   Today's Wt 208 lb 208 lb 218 lb 9.6 oz 219 lb 231 lb 12.8 oz 224 lb 13.9 oz 239 lb   BMI 27.44 kg/m2 27.44 kg/m2 28.84 kg/m2 28.89 kg/m2 30.58 kg/m2 29.67 kg/m2 31.53 kg/m2       Estimated Nutrition Needs (Based on): 2380 Kcals/day (BMR: 1835 x 1.3) , 75 g (0.8 g/kg) Protein  Carbohydrate: At Least 130 g/day  Fluids: 2380 mL/day or per primary team    NUTRITION DIAGNOSES:   Problem:  No nutritional diagnosis at this time      Etiology: related to       Signs/Symptoms: as evidenced by        NUTRITION INTERVENTIONS:  Meals/Snacks: General/healthful diet                  GOAL:   consume >75% of meals in 5-7 days    NUTRITION MONITORING AND EVALUATION      Food/Nutrient Intake Outcomes:  Total energy intake  Physical Signs/Symptoms Outcomes: Weight/weight change, Electrolyte and renal profile, GI, Glucose profile    Previous Goal Met:   [x] Met              [] Progressing Towards Goal              [] Not Progressing Towards Goal   Previous Recommendations:   [] Implemented          [] Not Implemented          [x] Not Applicable    LEARNING NEEDS (Diet, Food/Nutrient-Drug Interaction):    [x] None Identified   [] Identified and Education Provided/Documented   [] Identified and Pt declined/was not appropriate     Cultural, Anglican, OR Ethnic Dietary Needs:    [x] None Identified   [] Identified and Addressed     [x] Interdisciplinary Care Plan Reviewed/Documented    [x] Discharge Planning: General healthy diet   [] Participated in Interdisciplinary Rounds    NUTRITION RISK:    [] High              [] Moderate           [x]  Low  []  Minimal/Uncompromised      Garry Peck RDN  Pager [phone]  Weekend Pager [phone]

## 2018-01-02 NOTE — ROUTINE PROCESS
Attempted to change patient's dressing per order, however the xeroform was attached to patient's skin and I was unable to remove it without tearing the patient's skin. I was able to remove most of the xeroform by soaking the patient's foot in normal saline but was unsuccessful at removing the entire dressing. I paged the on call for Dr. Montrell Williamson, Dr. Yenni Stubbs to notify her about the situation and I also as put in an additional wound care consult for this patient.

## 2018-01-02 NOTE — WOUND CARE
Wound care Consult: Chart reviewed. Nursing has tried to remove the dressing that Dr. Kings Antunez ordered for this patient but it is way too stuck on to remove without lots of pain. Lidocaine 4% cream was ordered to apply to the foot dressing to remove it. Another dressing / wound care plan will be placed once I am able to visualize the wound.    Jose Lee RN, BSN, CWON ([id number])

## 2018-01-03 LAB
ANION GAP SERPL CALC-SCNC: 6 MMOL/L (ref 5–15)
BASOPHILS # BLD: 0.1 K/UL (ref 0–0.1)
BASOPHILS NFR BLD: 1 % (ref 0–1)
BUN SERPL-MCNC: 13 MG/DL (ref 6–20)
BUN/CREAT SERPL: 18 (ref 12–20)
CALCIUM SERPL-MCNC: 8.7 MG/DL (ref 8.5–10.1)
CHLORIDE SERPL-SCNC: 104 MMOL/L (ref 97–108)
CO2 SERPL-SCNC: 29 MMOL/L (ref 21–32)
CREAT SERPL-MCNC: 0.73 MG/DL (ref 0.7–1.3)
EOSINOPHIL # BLD: 0.5 K/UL (ref 0–0.4)
EOSINOPHIL NFR BLD: 6 % (ref 0–7)
ERYTHROCYTE [DISTWIDTH] IN BLOOD BY AUTOMATED COUNT: 16.4 % (ref 11.5–14.5)
GLUCOSE SERPL-MCNC: 99 MG/DL (ref 65–100)
HCT VFR BLD AUTO: 37.9 % (ref 36.6–50.3)
HGB BLD-MCNC: 11.9 G/DL (ref 12.1–17)
LYMPHOCYTES # BLD: 3.4 K/UL (ref 0.8–3.5)
LYMPHOCYTES NFR BLD: 36 % (ref 12–49)
MCH RBC QN AUTO: 26.6 PG (ref 26–34)
MCHC RBC AUTO-ENTMCNC: 31.4 G/DL (ref 30–36.5)
MCV RBC AUTO: 84.8 FL (ref 80–99)
MONOCYTES # BLD: 1.1 K/UL (ref 0–1)
MONOCYTES NFR BLD: 12 % (ref 5–13)
NEUTS SEG # BLD: 4.3 K/UL (ref 1.8–8)
NEUTS SEG NFR BLD: 45 % (ref 32–75)
PLATELET # BLD AUTO: 443 K/UL (ref 150–400)
POTASSIUM SERPL-SCNC: 3.7 MMOL/L (ref 3.5–5.1)
RBC # BLD AUTO: 4.47 M/UL (ref 4.1–5.7)
SODIUM SERPL-SCNC: 139 MMOL/L (ref 136–145)
WBC # BLD AUTO: 9.4 K/UL (ref 4.1–11.1)

## 2018-01-03 PROCEDURE — 74011250637 HC RX REV CODE- 250/637: Performed by: HOSPITALIST

## 2018-01-03 PROCEDURE — 74011250637 HC RX REV CODE- 250/637: Performed by: NURSE PRACTITIONER

## 2018-01-03 PROCEDURE — 36415 COLL VENOUS BLD VENIPUNCTURE: CPT | Performed by: INTERNAL MEDICINE

## 2018-01-03 PROCEDURE — 74011000258 HC RX REV CODE- 258: Performed by: INTERNAL MEDICINE

## 2018-01-03 PROCEDURE — 74011250636 HC RX REV CODE- 250/636: Performed by: INTERNAL MEDICINE

## 2018-01-03 PROCEDURE — 85025 COMPLETE CBC W/AUTO DIFF WBC: CPT | Performed by: INTERNAL MEDICINE

## 2018-01-03 PROCEDURE — 65660000000 HC RM CCU STEPDOWN

## 2018-01-03 PROCEDURE — 80048 BASIC METABOLIC PNL TOTAL CA: CPT | Performed by: INTERNAL MEDICINE

## 2018-01-03 PROCEDURE — 74011250637 HC RX REV CODE- 250/637: Performed by: INTERNAL MEDICINE

## 2018-01-03 PROCEDURE — 77030036687 HC SHOE PSTOP S2SG -A

## 2018-01-03 RX ORDER — HYDROMORPHONE HYDROCHLORIDE 8 MG/1
8 TABLET ORAL
Qty: 20 TAB | Refills: 0 | Status: SHIPPED | OUTPATIENT
Start: 2018-01-03 | End: 2018-01-04

## 2018-01-03 RX ORDER — POLYETHYLENE GLYCOL 3350 17 G/17G
17 POWDER, FOR SOLUTION ORAL DAILY
Qty: 30 EACH | Refills: 2 | Status: SHIPPED | OUTPATIENT
Start: 2018-01-04 | End: 2018-01-12

## 2018-01-03 RX ADMIN — HYDROMORPHONE HYDROCHLORIDE 8 MG: 2 TABLET ORAL at 19:49

## 2018-01-03 RX ADMIN — HYDROMORPHONE HYDROCHLORIDE 8 MG: 2 TABLET ORAL at 03:38

## 2018-01-03 RX ADMIN — HYDROMORPHONE HYDROCHLORIDE 8 MG: 2 TABLET ORAL at 10:17

## 2018-01-03 RX ADMIN — HYDROMORPHONE HYDROCHLORIDE 8 MG: 2 TABLET ORAL at 15:05

## 2018-01-03 RX ADMIN — METHADONE HYDROCHLORIDE 80 MG: 10 TABLET ORAL at 08:30

## 2018-01-03 RX ADMIN — SODIUM CHLORIDE: 900 INJECTION, SOLUTION INTRAVENOUS at 15:06

## 2018-01-03 RX ADMIN — POLYETHYLENE GLYCOL 3350 17 G: 17 POWDER, FOR SOLUTION ORAL at 09:00

## 2018-01-03 RX ADMIN — ALLOPURINOL 100 MG: 100 TABLET ORAL at 08:31

## 2018-01-03 RX ADMIN — COLCHICINE 0.6 MG: 0.6 TABLET, FILM COATED ORAL at 19:49

## 2018-01-03 RX ADMIN — SODIUM CHLORIDE: 900 INJECTION, SOLUTION INTRAVENOUS at 05:45

## 2018-01-03 RX ADMIN — LEVOTHYROXINE SODIUM 75 MCG: 75 TABLET ORAL at 08:29

## 2018-01-03 RX ADMIN — FAMOTIDINE 20 MG: 20 TABLET ORAL at 08:31

## 2018-01-03 RX ADMIN — COLCHICINE 0.6 MG: 0.6 TABLET, FILM COATED ORAL at 08:31

## 2018-01-03 RX ADMIN — FAMOTIDINE 20 MG: 20 TABLET ORAL at 17:19

## 2018-01-03 RX ADMIN — LISINOPRIL 40 MG: 20 TABLET ORAL at 08:31

## 2018-01-03 RX ADMIN — DOCUSATE SODIUM 100 MG: 100 CAPSULE, LIQUID FILLED ORAL at 08:29

## 2018-01-03 RX ADMIN — ENOXAPARIN SODIUM 40 MG: 40 INJECTION SUBCUTANEOUS at 17:19

## 2018-01-03 RX ADMIN — DOCUSATE SODIUM 100 MG: 100 CAPSULE, LIQUID FILLED ORAL at 17:19

## 2018-01-03 RX ADMIN — Medication 10 ML: at 15:07

## 2018-01-03 RX ADMIN — SODIUM CHLORIDE: 900 INJECTION, SOLUTION INTRAVENOUS at 21:00

## 2018-01-03 RX ADMIN — ASPIRIN 81 MG 81 MG: 81 TABLET ORAL at 08:30

## 2018-01-03 RX ADMIN — Medication 10 ML: at 05:45

## 2018-01-03 RX ADMIN — AMLODIPINE BESYLATE 5 MG: 5 TABLET ORAL at 08:31

## 2018-01-03 RX ADMIN — Medication 1 CAPSULE: at 08:30

## 2018-01-03 RX ADMIN — Medication 10 ML: at 21:00

## 2018-01-03 NOTE — PROGRESS NOTES
Call and spoke with Dr. Elsa Villasenor concerning PRN breakthrough pain medication for patient c/o left foot pain with a new order for morphine 2 mg IV every 3 hours PRN

## 2018-01-03 NOTE — PROGRESS NOTES
SHERRI had a conversation with Roger Hawley who claimed that she worked and lived with pt for 10 years. Henry Desai was very upset about pt get discharge back to community. Henry Desai said pt can't function himself at home and he need a rehab. SHERRI explained her that pt won't qualify for SNF benefit through his insurance except IP rehab/ HH. Henry Desai was very loud and wasn't cooperate with CM for discharge planing. She took CM's contact information to report to hospital administration regarding her disagreement with pt's  discharge plan. Henry Desai refused to said whether pt is homeless. Previous chart says pt [facility] in [address] [facility]. Sherri notified MD and SHERRI AD.     Mora Fam MSW  ED Case Manager   Ext [phone]

## 2018-01-03 NOTE — PROGRESS NOTES
Bedside and Verbal shift change report given to Jaleesa Burks RN (oncoming nurse) by EMERY Grayson (offgoing nurse). Report included the following information SBAR, Kardex, Procedure Summary, Intake/Output, MAR, Recent Results and Cardiac Rhythm NSR to Deonte Cat.

## 2018-01-03 NOTE — PROGRESS NOTES
Bedside shift change report given to Varinder Gale (oncoming nurse) by Josep Thomas RN (offgoing nurse). Report included the following information SBAR, Kardex, Intake/Output and Recent Results.

## 2018-01-03 NOTE — PROGRESS NOTES
Responded to request for AMD phoned into [facility] by RN. Pt was laying back in bed; nurse at bedside administering pain meds.  provided information about the AMD; pt asked for copy to look over. Copy was provided. Pt shared that he thought he had completed an AMD at [facility].  Pt would like to wait to have Jannie Crisostomo(?) help with the paperwork later this afternoon. Pt took contact info () and said he would call with any questions.  then provided listening presence as pt recounted some of the many challenges pt has been through in the last several years and during this hospitalization. Pt shared that he has a daughter who is 21years old. Pt also shared that the pain he is in makes it difficult sometimes to 'be himself' and he realizes he can have a \"very short fuse\" sometimes.  will follow up as able/needed. For Spiritual Care paging please call [phone]). Luciana Solis M.Div.   Palliative  Fellow

## 2018-01-03 NOTE — PROGRESS NOTES
Occupational Therapy  Attempted to see patient for OT treatment this afternoon, but he was on the [facility], frustrated by multiple interruptions, indicating he just wants some privacy. Will defer for now but continue to follow.

## 2018-01-03 NOTE — PROGRESS NOTES
Chart reviewed for PT treatment. Spoke to RN who reported verbal order obtained from [facility] for WBAT on LLE with postop shoe. Author retrieved appropriate shoe for patient, followed by [facility] to assess his baseline gait which per chart indicates use of cane. Author then entered patient's room, with patient without clothes on and attempting to void at [facility]. Patient extremely frustrated with Bettysie June, stating need for privacy repeatedly with author providing privacy as able as I dropped off shoe for future use. Further session aborted at this time; will f/u as able. Time spent: 7'.      Maribell Robins, PT, DPT, Lurdes Azul

## 2018-01-04 LAB
ANION GAP SERPL CALC-SCNC: 4 MMOL/L (ref 5–15)
BUN SERPL-MCNC: 15 MG/DL (ref 6–20)
BUN/CREAT SERPL: 18 (ref 12–20)
CALCIUM SERPL-MCNC: 8.8 MG/DL (ref 8.5–10.1)
CHLORIDE SERPL-SCNC: 104 MMOL/L (ref 97–108)
CO2 SERPL-SCNC: 31 MMOL/L (ref 21–32)
CREAT SERPL-MCNC: 0.82 MG/DL (ref 0.7–1.3)
GLUCOSE SERPL-MCNC: 96 MG/DL (ref 65–100)
POTASSIUM SERPL-SCNC: 4.2 MMOL/L (ref 3.5–5.1)
SODIUM SERPL-SCNC: 139 MMOL/L (ref 136–145)

## 2018-01-04 PROCEDURE — 97116 GAIT TRAINING THERAPY: CPT

## 2018-01-04 PROCEDURE — 97530 THERAPEUTIC ACTIVITIES: CPT

## 2018-01-04 PROCEDURE — 74011250637 HC RX REV CODE- 250/637: Performed by: HOSPITALIST

## 2018-01-04 PROCEDURE — 65660000000 HC RM CCU STEPDOWN

## 2018-01-04 PROCEDURE — 97530 THERAPEUTIC ACTIVITIES: CPT | Performed by: OCCUPATIONAL THERAPIST

## 2018-01-04 PROCEDURE — 80048 BASIC METABOLIC PNL TOTAL CA: CPT | Performed by: INTERNAL MEDICINE

## 2018-01-04 PROCEDURE — 74011250637 HC RX REV CODE- 250/637: Performed by: NURSE PRACTITIONER

## 2018-01-04 PROCEDURE — 74011250636 HC RX REV CODE- 250/636: Performed by: INTERNAL MEDICINE

## 2018-01-04 PROCEDURE — 74011000258 HC RX REV CODE- 258: Performed by: INTERNAL MEDICINE

## 2018-01-04 PROCEDURE — 74011250637 HC RX REV CODE- 250/637: Performed by: INTERNAL MEDICINE

## 2018-01-04 PROCEDURE — 36415 COLL VENOUS BLD VENIPUNCTURE: CPT | Performed by: INTERNAL MEDICINE

## 2018-01-04 RX ORDER — HYDROMORPHONE HYDROCHLORIDE 2 MG/1
8 TABLET ORAL
Status: DISCONTINUED | OUTPATIENT
Start: 2018-01-04 | End: 2018-01-04

## 2018-01-04 RX ORDER — HYDROMORPHONE HYDROCHLORIDE 2 MG/1
8 TABLET ORAL
Status: DISCONTINUED | OUTPATIENT
Start: 2018-01-04 | End: 2018-01-08

## 2018-01-04 RX ORDER — HYDROMORPHONE HYDROCHLORIDE 8 MG/1
8 TABLET ORAL
Qty: 12 TAB | Refills: 0 | Status: SHIPPED | OUTPATIENT
Start: 2018-01-04 | End: 2018-01-12

## 2018-01-04 RX ORDER — METHADONE HYDROCHLORIDE 10 MG/1
70 TABLET ORAL DAILY
Status: DISCONTINUED | OUTPATIENT
Start: 2018-01-05 | End: 2018-01-06

## 2018-01-04 RX ADMIN — FAMOTIDINE 20 MG: 20 TABLET ORAL at 09:35

## 2018-01-04 RX ADMIN — COLCHICINE 0.6 MG: 0.6 TABLET, FILM COATED ORAL at 19:05

## 2018-01-04 RX ADMIN — Medication 10 ML: at 15:25

## 2018-01-04 RX ADMIN — HYDROMORPHONE HYDROCHLORIDE 8 MG: 2 TABLET ORAL at 21:55

## 2018-01-04 RX ADMIN — SODIUM CHLORIDE: 900 INJECTION, SOLUTION INTRAVENOUS at 21:31

## 2018-01-04 RX ADMIN — HYDROMORPHONE HYDROCHLORIDE 8 MG: 2 TABLET ORAL at 15:42

## 2018-01-04 RX ADMIN — Medication 10 ML: at 21:32

## 2018-01-04 RX ADMIN — AMLODIPINE BESYLATE 5 MG: 5 TABLET ORAL at 09:35

## 2018-01-04 RX ADMIN — HYDROMORPHONE HYDROCHLORIDE 8 MG: 2 TABLET ORAL at 01:14

## 2018-01-04 RX ADMIN — ALLOPURINOL 100 MG: 100 TABLET ORAL at 09:36

## 2018-01-04 RX ADMIN — Medication 1 CAPSULE: at 09:36

## 2018-01-04 RX ADMIN — SODIUM CHLORIDE: 900 INJECTION, SOLUTION INTRAVENOUS at 06:09

## 2018-01-04 RX ADMIN — METHADONE HYDROCHLORIDE 80 MG: 10 TABLET ORAL at 09:34

## 2018-01-04 RX ADMIN — FAMOTIDINE 20 MG: 20 TABLET ORAL at 18:01

## 2018-01-04 RX ADMIN — LEVOTHYROXINE SODIUM 75 MCG: 75 TABLET ORAL at 09:35

## 2018-01-04 RX ADMIN — DOCUSATE SODIUM 100 MG: 100 CAPSULE, LIQUID FILLED ORAL at 18:01

## 2018-01-04 RX ADMIN — HYDROMORPHONE HYDROCHLORIDE 8 MG: 2 TABLET ORAL at 11:34

## 2018-01-04 RX ADMIN — ENOXAPARIN SODIUM 40 MG: 40 INJECTION SUBCUTANEOUS at 18:01

## 2018-01-04 RX ADMIN — SODIUM CHLORIDE: 900 INJECTION, SOLUTION INTRAVENOUS at 15:25

## 2018-01-04 RX ADMIN — DOCUSATE SODIUM 100 MG: 100 CAPSULE, LIQUID FILLED ORAL at 09:35

## 2018-01-04 RX ADMIN — Medication 10 ML: at 05:34

## 2018-01-04 RX ADMIN — HYDROMORPHONE HYDROCHLORIDE 8 MG: 2 TABLET ORAL at 05:34

## 2018-01-04 RX ADMIN — LISINOPRIL 40 MG: 20 TABLET ORAL at 09:36

## 2018-01-04 RX ADMIN — COLCHICINE 0.6 MG: 0.6 TABLET, FILM COATED ORAL at 09:37

## 2018-01-04 RX ADMIN — ASPIRIN 81 MG 81 MG: 81 TABLET ORAL at 09:35

## 2018-01-04 NOTE — PROGRESS NOTES
Bedside and Verbal shift change report given to Amanda Goins RN (oncoming nurse) by EMERY Grayson (offgoing nurse). Report included the following information SBAR, Kardex, Procedure Summary, Intake/Output, MAR and Recent Results.

## 2018-01-04 NOTE — PROGRESS NOTES
Bedside and Verbal shift change report given to Sobeida Morales RN (oncoming nurse) by EMERY Grayson (offgoing nurse). Report included the following information SBAR, Kardex, Procedure Summary, Intake/Output, MAR and Recent Results.

## 2018-01-04 NOTE — PROGRESS NOTES
Occupational Therapy TREATMENT  Patient: Tammy Mojica (18 y.o. male)  Date: 1/4/2018  Diagnosis: Bilateral cellulitis of lower leg  HTN (hypertension)  necrotic tissue left foot Bilateral cellulitis of lower leg  Procedure(s) (LRB):  INCISION AND DRAINAGE LEFT FOOT (Left) 6 Days Post-Op  Precautions: WBAT (WITH POST OP SHOE LLE)    ASSESSMENT:  Pt was seated EOB upon arrival, donning, with S, his L post op shoe in preparation for standing and functional transfer. Pt is now able to Weight bear as tolerated into his LLE s/p I&D on 12/29. Pt is in signficant pain in weight bearing, placing very limited weight into LLE. He needs assist X2 to stand demonstrating a  very strong lean to right requiring mod to max A for sit to stand and to maintain standing for a short time in preparation for taking steps to chair. Pt used his cane and required maximal assistance X2 for taking steps to chair. Once seated in the chair several attempts were made for ambulating--all with limited steps and maximal assistance X2 needed due to strong lean to the right and inability to effectively lift and place R foot using a straight cane and maximal assistance X2. See PT note for ambulation assessment. Pt transferred squat pivot transfer from chair to bed, and removed his L cast shoe and R shoe independently at the EOB. Pt continues to resist touch and movement in LUE due to hypersensitivity. He has been using the red cylindrical foam, keeping it in his L hand. Pt has several options provided for his L hand to prevent his fingernails from breaking down the palmar surface of his hand. Pt will need rehab at discharge.     Progression toward goals:  []       Improving appropriately and progressing toward goals  [x]       Improving slowly and progressing toward goals  []       Not making progress toward goals and plan of care will be adjusted     PLAN:  Patient continues to benefit from skilled intervention to address the above impairments. Continue treatment per established plan of care. Discharge Recommendations:  Rehab  Further Equipment Recommendations for Discharge:  tbd     SUBJECTIVE:   Patient stated It hurts too much.    (to put weight into LLE)    OBJECTIVE DATA SUMMARY:   Cognitive/Behavioral Status:  Neurologic State: Alert (due for pain medication soon. Pt painful in WB LLE post op )  Orientation Level: Oriented X4  Cognition: Follows commands  Perception: Appears intact  Perseveration: No perseveration noted  Safety/Judgement: Awareness of environment;Decreased awareness of need for assistance; Fall prevention;Home safety    Functional Mobility and Transfers for ADLs:  Bed Mobility:  Rolling: Modified independent  Supine to Sit: Modified independent  Scooting: Modified independent    Transfers:  Sit to Stand: Moderate assistance;Assist x2 (bed height elevated)       Balance:  Sitting: Intact  Standing: Impaired; With support  Standing - Static: Poor;Constant support  Standing - Dynamic : Poor    ADL Intervention:   As above                        Lower Body Dressing Assistance  Underpants:  (needs A to pull up when in standing, declines bed level dres)  Shoes with Velcro:  (post op shoe--Mitali to don/S with addtl time doff)  Leg Crossed Method Used: No  Position Performed: Seated edge of bed  Cues: Doff; Don         Cognitive Retraining  Safety/Judgement: Awareness of environment;Decreased awareness of need for assistance; Fall prevention;Home safety               Therapeutic Exercises:   Encouraged pt to self range his LUE as he does not allow touch to his LUE from therapist due to hypersensivity.      Pain:  Pain Scale 1: Numeric (0 - 10)  Pain Intensity 1: 7  Pain Location 1: Foot  Pain Orientation 1: Left  Pain Description 1: Aching  Pain Intervention(s) 1: Medication (see MAR)  After treatment:   [] Patient left in no apparent distress sitting up in chair  [x] Patient left in no apparent distress at edge of bed as requested  [x] Call bell left within reach  [x] Nursing notified  [] Caregiver present  [] Bed alarm activated    COMMUNICATION/COLLABORATION:   The patients plan of care was discussed with: Physical Therapist and Registered Nurse    Gabriel Valadez OTR/L  Time Calculation: 31 mins

## 2018-01-04 NOTE — PROGRESS NOTES
1915 - Bedside and Verbal shift change report given to clive dumont (oncoming nurse) by Sarita pate (offgoing nurse). Report included the following information SBAR, Kardex, Intake/Output, MAR and Recent Results. 0700 - Bedside and Verbal shift change report given to aSrita pate (oncoming nurse) by Elke Min (offgoing nurse). Report included the following information SBAR, Kardex, Intake/Output, MAR and Recent Results.

## 2018-01-04 NOTE — INTERDISCIPLINARY ROUNDS
Bedside interdisciplinary rounds were held today to discuss patient plan of care and outcomes. The following members were present: Physician, Nurse, Clinical Care Leader, Pharmacy, Physical Therapy, and Case Management. Plan:  Patient ready for discharge pending placement/plan. Patient's \"friend\" provided CM with name and number of someone with patient's insurance company - CM to follow up with that person.

## 2018-01-04 NOTE — PROGRESS NOTES
CM sent referral via [facility] to [facility] re: placement. Pt is medically stable for discharge. CM called Admissions Director at [facility] re: referral. Monika Heaton is to look at referral and contact CM re: acceptance. Per pt's Medicaid , if a UAI is complete, the pt may be eligible for LTC benefits. CM to complete a UAI for pt. CM to continue to remain available for discharge planning as needed. Care Management Interventions  PCP Verified by CM:  Yes  Transition of Care Consult (CM Consult): Discharge Planning (Pt had [facility] about a month ago for wound care)  Discharge Durable Medical Equipment:  (Pt uses a cane for ambulation)  Physical Therapy Consult: Yes  Occupational Therapy Consult: Yes  Current Support Network: Own Home  Confirm Follow Up Transport: Self (Pt drives but has supportive family to assist if needed)  Plan discussed with Pt/Family/Caregiver: Yes  Discharge Location  Discharge Placement: Home    Ivet Angel MSW  [address]  [phone]

## 2018-01-04 NOTE — PROGRESS NOTES
Hospitalist Progress Note    NAME: Rachel Alan   :  1965       Assessment / Plan:  LE cellulitis POA with purulent drainage  Chronic non healing L Foot ulcer/wound with cellulitis POA  zosyn day 13  BC NTD  wound cultures: pseudomonas. Enterococcus and Grp B strep in thio broth  D/C on Cipro 750 mg po bid, Augmentin 875 mg bid x 10 days. MRI left foot :No fluid collection/ osteomyelitis  ain management: appreciated palliative care team greatly  1. Pain level should be based on observation, not patient rating. 2. Increase the Methadone to 80mg/day. 3. Increase the Dilaudid to 8mg PO q. 4 hours prn.  4. Discontinue the IV Dilaudid. 5. When pt is ready for discharge, do not provide any opioid prescriptions. The methadone will prevent withdrawal symptoms when the dilaudid is stopped.    Primary pain specialist: Dr. Kelly Suarez  Vascular surgery and podiatry help appreciated  Pt seen twice by Dr Sergey Osborn  and as second opinion Dr Santa Bird   [place] to have adequate left foot arterial profusion to undergo debridement of tissue  OR for debridement 2017  LE dopplers negative for DVT  Cultures with rare diptheroids and pseudomonas from thio broth  Medically ready for discharge  3-4 calls to podiatry office, gave them my cell phone, no call back over 2 days      Reviewed notes, no mention of limited activity and cleared for discharge      Okay to weight bear on left foot     Essential HTN POA/ sinus bradycardia   monitor on tele   Increased home lisinopril this admission, stable electrolytes  BP still uncontrolled, added norvasc  IV hydralazine PRN    Bladder mass   Per pt he was diagnosed with a bladder mass at [facility] per pt  Unable to get records  follow with urology OP as planned previously ( per pt he has appointment)   D/W pt at length    Left Hemiparesis from Old CVA POA    Chronic Pain syndrome on maintenance Methadone at [address] [place] [address] 65 mg daily   Wean to baseline methadone    Hyperlipidemia, not on meds at home     Gout arthritis, cont allopurinol     Code Status: Full    Surrogate Decision Maker: sister Stefany Fisher # [phone]    DVT Prophylaxis: SQ Lovenox    Baseline: lives alone, ambulating with cane    Body mass index is 27.44 kg/(m^2). Recommended Disposition:   D/w pt dc planning today: awaiting debridement today then once OK with podiatry can be dc      Subjective:     Chief Complaint / Reason for Physician Visit: left foot cellulitis / HTN   \"my foot is better now that they changed the dressing\"  No other complaints, no diarrhea  Still says he has trouble walking  Unable to reach podiatry, reviewed all notes from them, no  Discussed with RN events overnight. Review of Systems:  Symptom Y/N Comments  Symptom Y/N Comments   Fever/Chills n   Chest Pain n    Poor Appetite    Edema     Cough    Abdominal Pain n    Sputum    Joint Pain     SOB/BECK n   Pruritis/Rash     Nausea/vomit n   Tolerating PT/OT y    Diarrhea n   Tolerating Diet     Constipation n   Other       Could NOT obtain due to:      Objective:     VITALS:   Last 24hrs VS reviewed since prior progress note. Most recent are:  Patient Vitals for the past 24 hrs:   Temp Pulse Resp BP SpO2   01/03/18 1503 98.2 °F (36.8 °C) (!) 58 18 136/78 95 %   01/03/18 1136 98.4 °F (36.9 °C) (!) 56 18 142/87 96 %       Intake/Output Summary (Last 24 hours) at 01/04/18 0700  Last data filed at 01/04/18 0400   Gross per 24 hour   Intake              740 ml   Output             1450 ml   Net             -710 ml        PHYSICAL EXAM:  General: WD, WN. Alert, cooperative, no acute distress    EENT:  EOMI. Anicteric sclerae. MMM  Resp:  CTA bilaterally, no wheezing or rales.   No accessory muscle use  CV:  Regular  rhythm,  R LE swelling/erythema - resolved; L foot swelling better     GI:  Soft, Non distended, Non tender.  +Bowel sounds  Neurologic:  Alert and oriented X 3, normal speech,   Psych:   Good insight. Not anxious nor agitated  Skin:  No rashes. No jaundice. Left foot is bandaged post-op                                                                        12/22 12/26              Reviewed most current lab test results and cultures  YES  Reviewed most current radiology test results   YES  Review and summation of old records today    NO  Reviewed patient's current orders and MAR    YES  PMH/SH reviewed - no change compared to H&P  ________________________________________________________________________  Care Plan discussed with:    Comments   Patient y    Family      RN y    Care Manager y    Consultant                        Multidiciplinary team rounds were held today with , nursing, pharmacist and clinical coordinator. Patient's plan of care was discussed; medications were reviewed and discharge planning was addressed. _______________________________________________________________________  Total NON critical care TIME:  25 Minutes    Total CRITICAL CARE TIME Spent:   Minutes non procedure based      Comments   >50% of visit spent in counseling and coordination of care     ________________________________________________________________________  Theodora Noyola MD     Procedures: see electronic medical records for all procedures/Xrays and details which were not copied into this note but were reviewed prior to creation of Plan. LABS:  I reviewed today's most current labs and imaging studies.   Pertinent labs include:  Recent Labs      01/03/18   0403   WBC  9.4   HGB  11.9*   HCT  37.9   PLT  443*     Recent Labs       Signed: Theodora Noyola MD

## 2018-01-04 NOTE — PROGRESS NOTES
Hospitalist Progress Note    NAME: Jose Camilo   :  1965       Assessment / Plan:  LE cellulitis POA with purulent drainage  Chronic non healing L Foot ulcer/wound with cellulitis POA  zosyn day 13  BC NTD  wound cultures: pseudomonas. Enterococcus and Grp B strep in thio broth  D/C on Cipro 750 mg po bid, Augmentin 875 mg bid x 10 days. MRI left foot :No fluid collection/ osteomyelitis  ain management: appreciated palliative care team greatly  1. Pain level should be based on observation, not patient rating. 2. Increase the Methadone to 80mg/day. 3. Increase the Dilaudid to 8mg PO q. 4 hours prn.  4. Discontinue the IV Dilaudid. 5. When pt is ready for discharge, do not provide any opioid prescriptions. The methadone will prevent withdrawal symptoms when the dilaudid is stopped.    Primary pain specialist: Dr. Juanita Mariscal  Vascular surgery and podiatry help appreciated  Pt seen twice by Dr Antoinette Gama  and as second opinion Dr Nimesh Yo   [place] to have adequate left foot arterial profusion to undergo debridement of tissue  OR for debridement 2017  LE dopplers negative for DVT  Cultures with rare diptheroids and pseudomonas from thio broth  Medically ready for discharge  3-4 calls to podiatry office, gave them my cell phone, no call back over 2 days      Reviewed notes, no mention of limited activity and cleared for discharge      Okay to weight bear on left foot     Essential HTN POA/ sinus bradycardia   monitor on tele   Increased home lisinopril this admission, stable electrolytes  BP still uncontrolled, added norvasc  IV hydralazine PRN    Bladder mass   Per pt he was diagnosed with a bladder mass at [facility] per pt  Unable to get records  follow with urology OP as planned previously ( per pt he has appointment)   D/W pt at length    Left Hemiparesis from Old CVA POA    Chronic Pain syndrome on maintenance Methadone at [address] [place] [address] 65 mg daily   Wean to baseline methadone    Hyperlipidemia, not on meds at home     Gout arthritis, cont allopurinol     Code Status: Full    Surrogate Decision Maker: sister eBcka Hale # [phone]    DVT Prophylaxis: SQ Lovenox    Baseline: lives alone, ambulating with cane    Body mass index is 27.44 kg/(m^2). Recommended Disposition:   D/w pt dc planning today: awaiting debridement today then once OK with podiatry can be dc      Subjective:     Chief Complaint / Reason for Physician Visit: left foot cellulitis / HTN   \"my left foot is still sore\"  No other complaints, no diarrhea  Still says he has trouble walking  Medically ready for discharge, family says pt has SNF benefits, CM looking into it  Refused to work with PT yesterday, spoke with him to do so today  Discussed with RN events overnight. Review of Systems:  Symptom Y/N Comments  Symptom Y/N Comments   Fever/Chills n   Chest Pain n    Poor Appetite    Edema     Cough    Abdominal Pain n    Sputum    Joint Pain     SOB/BECK n   Pruritis/Rash     Nausea/vomit n   Tolerating PT/OT y    Diarrhea n   Tolerating Diet     Constipation n   Other       Could NOT obtain due to:      Objective:     VITALS:   Last 24hrs VS reviewed since prior progress note. Most recent are:  Patient Vitals for the past 24 hrs:   Temp Pulse Resp BP SpO2   01/03/18 1503 98.2 °F (36.8 °C) (!) 58 18 136/78 95 %   01/03/18 1136 98.4 °F (36.9 °C) (!) 56 18 142/87 96 %       Intake/Output Summary (Last 24 hours) at 01/04/18 1247  Last data filed at 01/04/18 0857   Gross per 24 hour   Intake              740 ml   Output             2350 ml   Net            -1610 ml        PHYSICAL EXAM:  General: WD, WN. Alert, cooperative, no acute distress    EENT:  EOMI. Anicteric sclerae. MMM  Resp:  CTA bilaterally, no wheezing or rales.   No accessory muscle use  CV:  Regular  rhythm,  R LE swelling/erythema - resolved; L foot swelling better     GI:  Soft, Non distended, Non tender.  +Bowel sounds  Neurologic:  Alert and oriented X 3, normal speech  Psych:   Good insight. Not anxious nor agitated  Skin:  No rashes. No jaundice. Saw left foot yesterday, looks significantly improved even from 12/26/17 photo                                                                        12/22 12/26              Reviewed most current lab test results and cultures  YES  Reviewed most current radiology test results   YES  Review and summation of old records today    NO  Reviewed patient's current orders and MAR    YES  PMH/SH reviewed - no change compared to H&P  ________________________________________________________________________  Care Plan discussed with:    Comments   Patient y    Family      RN y    Care Manager y    Consultant                        Multidiciplinary team rounds were held today with , nursing, pharmacist and clinical coordinator. Patient's plan of care was discussed; medications were reviewed and discharge planning was addressed. _______________________________________________________________________  Total NON critical care TIME:  25 Minutes    Total CRITICAL CARE TIME Spent:   Minutes non procedure based      Comments   >50% of visit spent in counseling and coordination of care     ________________________________________________________________________  Cindy De La Rosa MD     Procedures: see electronic medical records for all procedures/Xrays and details which were not copied into this note but were reviewed prior to creation of Plan. LABS:  I reviewed today's most current labs and imaging studies.   Pertinent labs include:  Recent Labs      01/03/18   0403   WBC  9.4   HGB  11.9*   HCT  37.9   PLT  443*     Recent Labs       Signed: Cindy De La Rosa MD

## 2018-01-04 NOTE — PROGRESS NOTES
Problem: Mobility Impaired (Adult and Pediatric)  Goal: *Acute Goals and Plan of Care (Insert Text)  Physical Therapy Goals  Goals reviewed and revised 1/4/2018  1. Patient will move from supine to sit and sit to supine , scoot up and down and roll side to side in bed with independence within 7 day(s). 2.  Patient will transfer from bed to chair and chair to bed with modified independence using the least restrictive device within 7 day(s). 3.  Patient will perform sit to stand with minimal assistance/contact guard assist within 7 day(s). 4.  Patient will ambulate with minimal assistance/contact guard assist for 75 feet with the least restrictive device within 7 day(s). Initiated 12/28/2017  1. Patient will move from supine to sit and sit to supine  and roll side to side in bed with independence within 7 day(s). 2.  Patient will transfer from bed to chair and chair to bed with modified independence using the least restrictive device within 7 day(s). 3.  Patient will perform sit to stand with modified independence within 7 day(s). 4.  Patient will ambulate with supervision/set-up for 50 feet with the least restrictive device within 7 day(s). physical Therapy TREATMENT: WEEKLY REASSESSMENT  Patient: Wellington Cody (18 y.o. male)  Date: 1/4/2018  Diagnosis: Bilateral cellulitis of lower leg  HTN (hypertension)  necrotic tissue left foot Bilateral cellulitis of lower leg  Procedure(s) (LRB):  INCISION AND DRAINAGE LEFT FOOT (Left) 6 Days Post-Op  Precautions: WBAT (WITH POST OP SHOE LLE)    ASSESSMENT:  Pt received supine in bed and agreeable to therapy intervention. Pt cleared by nursing for mobility. Pt has been cleared for WBAT with post-op shoe on L. Pt continues to demonstrate limited functional mobility secondary to impaired standing balance, poor gait mechanics, increased L foot pain, LUE paralysis, and poor safety awareness.  Bed mobility performed mod I and transfers performed with mod A x 2 and use of SPC. Pt able to jessy post-op shoe on L and tennis shoe on R, however required significant amount of time to complete while sitting on EOB. Pt with significant R sided lean/LOB in standing, requiring max A x 2 to remain upright with SPC support as pt hesitant to place weight through LLE due to pain. Pt refused to attempt standing/gait with RW support as he felt that his cane provided the best support for him. He performed 3 gait training trials with SPC x 2 and noe-walker x 1. He need mod-max A x 2 and with poor gait quality overall. Pt unable to use noe-walker safely secondary to increased scissoring, poor foot clearance, and significantly impaired balance. Pt refused to sit up in bedside chair, however was unable to safely ambulate back to bed, requiring chair be pushed to EOB for SPT. Needed min-mod A x 2 for SPT to EOB. Pt was left sitting on EOB with all needs met, OT present, and RN aware following therapy session. Pt is NOT safe to discharge home alone and is a HIGH fall risk. He would greatly benefit from rehab to address gait and balance impairments. PT will continue to follow to address mobility impairments. Patient's progression toward goals since last assessment: Slow progress     PLAN:  Goals have been updated based on progression since last assessment. Patient continues to benefit from skilled intervention to address the above impairments. Continue to follow the patient 4 times a week to address goals.   Planned Interventions:  [x]              Bed Mobility Training             []       Neuromuscular Re-Education  [x]              Transfer Training                   []       Orthotic/Prosthetic Training  [x]              Gait Training                         []       Modalities  [x]              Therapeutic Exercises           []       Edema Management/Control  [x]              Therapeutic Activities            [x]       Patient and Family Training/Education  []              Other (comment):  Discharge Recommendations: Rehab  Further Equipment Recommendations for Discharge: TBD by rehab     SUBJECTIVE:   Patient stated It just hurts really badly.     OBJECTIVE DATA SUMMARY:   Critical Behavior:  Neurologic State: Alert  Orientation Level: Oriented X4  Cognition: Appropriate decision making, Appropriate for age attention/concentration, Appropriate safety awareness, Follows commands  Safety/Judgement: Awareness of environment, Fall prevention, Insight into deficits    Functional Mobility Training:  Bed Mobility:  Rolling: Modified independent  Supine to Sit: Modified independent     Scooting: Modified independent        Transfers:  Sit to Stand: Moderate assistance;Assist x2 (bed height elevated)  Stand to Sit: Moderate assistance;Assist x2 (uncontrolled descent)        Bed to Chair: Minimum assistance; Moderate assistance;Assist x2 (chair>bed)                    Balance:  Sitting: Intact  Standing: Impaired; With support  Standing - Static: Poor;Constant support  Standing - Dynamic : Poor  Ambulation/Gait Training:  Distance (ft): 3 Feet (ft) (4, 2)  Assistive Device: Gait belt;Cane, straight;Walker noe (post-op shoe)  Ambulation - Level of Assistance: Moderate assistance;Maximum assistance;Assist x2; Additional time; Adaptive equipment        Gait Abnormalities: Antalgic;Decreased step clearance; Step to gait;Scissoring; Altered arm swing;Path deviations  Right Side Weight Bearing: Full  Left Side Weight Bearing: As tolerated  Base of Support: Widened  Stance: Left decreased;Time  Speed/Reva: Pace decreased (<100 feet/min); Shuffled  Step Length: Left shortened;Right shortened  Swing Pattern: Left asymmetrical    Pain:  Pain Scale 1: Numeric (0 - 10)  Pain Intensity 1: 7  Pain Location 1: Foot  Pain Orientation 1: Left  Pain Description 1: Aching  Pain Intervention(s) 1: Medication (see MAR)  Activity Tolerance:   Poor - 10/10 L foot pain with WB; fatigue; SOB with activity  Please refer to the flowsheet for vital signs taken during this treatment.   After treatment:   []  Patient left in no apparent distress sitting up in chair  [x]  Patient left in no apparent distress in bed  [x]  Call bell left within reach  [x]  Nursing notified  [x]  Caregiver present  []  Bed alarm activated    COMMUNICATION/COLLABORATION:   The patients plan of care was discussed with: Physical Therapist, Occupational Therapist, Registered Nurse, Physician and     Sharmila Hooker, PT, DPT   Time Calculation: 41 mins

## 2018-01-05 PROCEDURE — 74011250637 HC RX REV CODE- 250/637: Performed by: INTERNAL MEDICINE

## 2018-01-05 PROCEDURE — 65660000000 HC RM CCU STEPDOWN

## 2018-01-05 PROCEDURE — 74011250636 HC RX REV CODE- 250/636: Performed by: INTERNAL MEDICINE

## 2018-01-05 PROCEDURE — 74011250637 HC RX REV CODE- 250/637: Performed by: HOSPITALIST

## 2018-01-05 RX ADMIN — DOCUSATE SODIUM 100 MG: 100 CAPSULE, LIQUID FILLED ORAL at 16:50

## 2018-01-05 RX ADMIN — LISINOPRIL 40 MG: 20 TABLET ORAL at 09:01

## 2018-01-05 RX ADMIN — COLCHICINE 0.6 MG: 0.6 TABLET, FILM COATED ORAL at 09:09

## 2018-01-05 RX ADMIN — POLYETHYLENE GLYCOL 3350 17 G: 17 POWDER, FOR SOLUTION ORAL at 09:00

## 2018-01-05 RX ADMIN — HYDROMORPHONE HYDROCHLORIDE 8 MG: 2 TABLET ORAL at 16:50

## 2018-01-05 RX ADMIN — FAMOTIDINE 20 MG: 20 TABLET ORAL at 16:51

## 2018-01-05 RX ADMIN — LEVOTHYROXINE SODIUM 75 MCG: 75 TABLET ORAL at 09:00

## 2018-01-05 RX ADMIN — COLCHICINE 0.6 MG: 0.6 TABLET, FILM COATED ORAL at 16:50

## 2018-01-05 RX ADMIN — METHADONE HYDROCHLORIDE 70 MG: 10 TABLET ORAL at 09:00

## 2018-01-05 RX ADMIN — Medication 10 ML: at 09:02

## 2018-01-05 RX ADMIN — HYDROMORPHONE HYDROCHLORIDE 8 MG: 2 TABLET ORAL at 04:09

## 2018-01-05 RX ADMIN — ENOXAPARIN SODIUM 40 MG: 40 INJECTION SUBCUTANEOUS at 16:49

## 2018-01-05 RX ADMIN — ASPIRIN 81 MG 81 MG: 81 TABLET ORAL at 09:02

## 2018-01-05 RX ADMIN — FAMOTIDINE 20 MG: 20 TABLET ORAL at 09:01

## 2018-01-05 RX ADMIN — ALLOPURINOL 100 MG: 100 TABLET ORAL at 09:01

## 2018-01-05 RX ADMIN — AMLODIPINE BESYLATE 5 MG: 5 TABLET ORAL at 09:02

## 2018-01-05 RX ADMIN — Medication 1 CAPSULE: at 09:01

## 2018-01-05 RX ADMIN — HYDROMORPHONE HYDROCHLORIDE 8 MG: 2 TABLET ORAL at 11:13

## 2018-01-05 RX ADMIN — DOCUSATE SODIUM 100 MG: 100 CAPSULE, LIQUID FILLED ORAL at 09:01

## 2018-01-05 NOTE — PROGRESS NOTES
Have had conversations with patient's friend, Megan Lockett ([phone]) on two separate occassions today to assist with answering questions regarding disposition plans. Reviewed benefits under patient's Medicaid insurance and that beds with this insurance coverage are very difficult to locate. Provided Ms. Nicole John with information on pursuing other NH facilities if patient decides he does not want to remain at [facility] which has offered bed. Reviewed with Ms. Nicole John and patient that patient is being discharged today and the options of NH or return to home. Patient has my phone number if he has additional questions or needs.     Demario Peer  AD/Care Management  [phone]

## 2018-01-05 NOTE — PROGRESS NOTES
PALLIATIVE MEDICINE          Notified this afternoon that [address] does not carry Methadone. Because pt is receiving Methadone from [facility] for addiction purposes, only the clinic can provide Methadone in an outpatient setting. I spoke with Nesha Darling  [phone], the director of [address] [place] [address], he is contacting the [facility], to request an exception request, which will allow the patient to  a 2 week supply of Methadone, to be dispensed at [facility] during his stay. Unfortunately, this problem did not come to our attention until late this afternoon, so it most likely will not be resolved before Monday. If I hear anything before then, I will keep Dariana Chaves informed.

## 2018-01-05 NOTE — PROGRESS NOTES
Mr. Catherine Calderon has a followup appointment scheduled with Dr. Ibeth Mirza (ID) 1/25 @ [facility].

## 2018-01-05 NOTE — PROGRESS NOTES
Attempted follow up and to complete AMD.  Pt was toileting. For Spiritual Care paging please call [address] [place] [phone]). Don Bates M.Div.   Palliative  Fellow

## 2018-01-05 NOTE — PROGRESS NOTES
[facility]                                                                        47 y.o.   male    [facility]   Room: [address] [place] [address] [facility] OR[facility]  Unit Phone# :  [phone]  [address] [address]  Dept: [phone]  Loc: [phone]                    SITUATION     Admitted:  12/21/2017         Attending Provider:  Horris Pallas, MD       Consultations:  IP CONSULT TO PODIATRY  IP CONSULT TO INFECTIOUS DISEASES  IP CONSULT TO PALLIATIVE CARE - PROVIDER  IP CONSULT TO VASCULAR SURGERY    PCP:  Rip Dickey MD   [phone]    Treatment Team: Attending Provider: Horris Pallas, MD; Consulting Provider: Arthur Sanford DPM; Utilization Review: Rodney Aquino RN; Consulting Provider: Obed Awan MD; Consulting Provider: Misha Pierson NP; Care Manager: RG Polanco    Admitting Dx:  Bilateral cellulitis of lower leg  HTN (hypertension)  necrotic tissue left foot       Principal Problem: Bilateral cellulitis of lower leg    7 Days Post-Op of   Procedure(s):  INCISION AND DRAINAGE LEFT FOOT   BY: Arthur Sanford DPM             ON: 12/29/2017                  Code Status: Full Code                Advance Directives:   Advance Care Planning 12/25/2017   Patient's Healthcare Decision Maker is: Verbal statement (Legal Next of Kin remains as decision maker)   Primary Decision Maker Name -   Confirm Advance Directive None    (Send w/patient)   No Doesnt Have       Isolation:  Contact       MDRO: No current active infections    Pain Medications given:  Dilaudid    [facility] needed: yes  Type of equipment: post op shoe      (Not currently on dialysis)  (Not currently on dialysis)  (Not currently on dialysis)     BACKGROUND     Allergies:   Allergies   Allergen Reactions    Sulfamethoxazole-Trimethoprim Rash     L eye and L hand    Ciprofloxacin Hives     Per pcp records    Codeine Hives     Tolerates dilaudid, oxycodone    Lyrica [Pregabalin] Hives    Ultram [Tramadol] Hives       Past Medical History:   Diagnosis Date    [facility]     (with stroke)    Gout     Hypercholesterolemia     Hypertension     Lesion of bladder     Seizures ([facility]     Stroke ([facility] 2006    left sided weakness    Thromboembolus ([facility]     Thyroid disease     TIA (transient ischemic attack) 2012       Past Surgical History:   Procedure Laterality Date    HX ORTHOPAEDIC      KNEE ARTHROSCP HARV      left knee       Prescriptions Prior to Admission   Medication Sig    METHADONE HCL (METHADONE, BULK,) Take 65 mg by mouth daily. Per [facility] [phone] Dr. Lemus Ashok  Verified 12-22-17    levothyroxine (SYNTHROID) 75 mcg tablet Take 75 mcg by mouth Daily (before breakfast).  aspirin 81 mg chewable tablet Take 1 Tab by mouth daily.  lisinopril (PRINIVIL, ZESTRIL) 20 mg tablet Take 1 Tab by mouth daily.  allopurinol (ZYLOPRIM) 100 mg tablet Take  by mouth daily.  colchicine (COLCRYS) 0.6 mg tablet Take 0.6 mg by mouth daily. Hard scripts included in transfer packet yes    Vaccinations: There is no immunization history for the selected administration types on file for this patient. Readmission Risks:    Known Risks: The Charlson [employer] Index tool is an evidenced based tool that has more automatic generated information. The tool looks at many different items such as the age of the patient, how many times they were admitted in the last calendar year, current length of stay in the hospital and their diagnosis. All of these items are pulled automatically from information documented in the chart from various places and will generate a score that predicts whether a patient is at low (less than 13), medium (13-20) or high (21 or greater) risk of being readmitted.         ASSESSMENT Temp: 97.6 °F (36.4 °C) (01/05/18 0639) Pulse (Heart Rate): 69 (01/05/18 1350)     Resp Rate: 18 (01/05/18 1350)           BP: 133/89 (01/05/18 1350)     O2 Sat (%): 98 % (01/05/18 1350)     Weight: 94.3 kg (208 lb)    Height: 6' 1\" (185.4 cm) (12/21/17 1047)       If above not within 1 hour of discharge:    BP:_____  P:____  R:____ T:_____ O2 Sat: ___%  O2: ______    Active Orders   Diet    DIET REGULAR         Orientation: oriented to time, place, person and situation     Active Behaviors: None                                   Active Lines/Drains:  (Peg Tube / Gross / CL or S/L?): no    Urinary Status: Voiding     Last BM: Last Bowel Movement Date: 01/03/18     Skin Integrity: Incision (comment) (left foot)   Wound Foot Left-DRESSING STATUS: Clean, dry, and intact  Wound Foot Left-DRESSING STATUS: Changed per order, Clean, dry, and intact    Wound Foot Left-DRESSING TYPE: 4 x 4, Xeroform, Elastic bandage  Wound Foot Left-DRESSING TYPE: 4 x 4, Gauze, Gauze wrap (radha)    Mobility: Slightly limited   Weight Bearing Status: WBAT (Weight Bearing as Tolerated)      Gait Training  Assistive Device: Gait belt, Cane, straight, Walker noe (post-op shoe)  Ambulation - Level of Assistance: Moderate assistance, Maximum assistance, Assist x2, Additional time, Adaptive equipment  Distance (ft): 3 Feet (ft) (4, 2)         Lab Results   Component Value Date/Time    Glucose 96 01/04/2018 04:05 AM    Hemoglobin A1c 5.0 12/22/2017 03:01 AM    INR 1.1 12/21/2017 11:28 AM    INR 1.1 02/07/2013 02:50 PM    HGB 11.9 01/03/2018 04:03 AM    HGB 11.5 12/31/2017 06:18 AM        RECOMMENDATION     See After Visit Summary (AVS) for:  · Discharge instructions  · After [address]   · Special equipment needed (entered pre-discharge by Care Management)  · Medication Reconciliation    · Follow up Appointment(s)         Report given/sent by: Aki Lindsey RN                    Verbal report given to:  Justin Land  FAXED to:  [phone] Estimated discharge time:  1/5/2018 at 1530

## 2018-01-05 NOTE — PROGRESS NOTES
Infectious Disease Progress Note        IMPRESSION:   1. Chronic ulcer dorsum of left foot with no evidence of Osteomyelitis, healing well, no slough . 2. B/l  Lower extremity cellulitis resolved. 3. WC + for Pseudomonas x 2 strains from thio broth, diphtheroids ( ) WC on  + for  Pseudomonas x 2 strains. E.faecalis & Gp B beta hemolytic Strep from  thio broth only. PLAN:   1. Completed Pip- tazobactam IV x 14 days   2. Daily wound care , elevate both LE   3. Out pt wound care is essential in order for continued wound healing  4. Follow up with Podiatry in 1-2 weeks   5. Follow up with me in 2-3 weeks . ( call my office at [phone] to make appt )       Subjective:     Pt seen . No new complaints. Review of Systems:  A comprehensive review of systems was negative except for that written in the History of Present Illness. Objective:     Blood pressure 115/69, pulse (!) 53, temperature 97.6 °F (36.4 °C), resp. rate 18, height 6' 1\" (1.854 m), weight 94.3 kg (208 lb), SpO2 95 %. Temp (24hrs), Av.5 °F (36.9 °C), Min:97.6 °F (36.4 °C), Max:98.9 °F (37.2 °C)      Lines:  Peripheral IV:            Physical Exam:   General:  Cooperative, awake   Eyes:  Sclera anicteric. Pupils equally round and reactive to light. Mouth/Throat: Mucous membranes normal, oral pharynx clear   Neck: Supple   Lungs:   Clear to auscultation bilaterally, good effort   CV:  Regular rate and rhythm,no murmur, click, rub or gallop   Abdomen:   Soft, non-tender. bowel sounds normal. non-distended   Extremities: B/l lower extremity  No edema , erythema , not warm to touch Wound exam done . Wound healing , no discharge. Surrounding skin appears healthy.                Lines/Devices:  Intact, no erythema, drainage or tenderness     Data Review:   CBC:   Recent Labs      18   0403   WBC  9.4   RBC  4.47   HGB  11.9*   HCT  37.9   PLT  443*   GRANS  45   LYMPH  36   EOS  6     CMP:   Recent Labs      18 0405  01/03/18   0403   GLU  96  99   NA  139  139   K  4.2  3.7   CL  104  104   CO2  31  29   BUN  15  13   CREA  0.82  0.73   CA  8.8  8.7   AGAP  4*  6   BUCR  18  18       Studies:      Lab Results   Component Value Date/Time    Culture result: NO ANAEROBES ISOLATED 12/29/2017 06:45 PM    Culture result:  12/29/2017 06:45 PM     PSEUDOMONAS AERUGINOSA (STRAIN 1) ISOLATED FROM THIO BROTH ONLY    Culture result:  12/29/2017 06:45 PM     PSEUDOMONAS AERUGINOSA (STRAIN 2) ISOLATED FROM THIO BROTH ONLY    Culture result: RARE DIPHTHEROIDS 12/29/2017 06:45 PM    Culture result:  12/22/2017 01:30 PM     LIGHT PSEUDOMONAS AERUGINOSA (1ST STRAIN/COLONY TYPE)    Culture result:  12/22/2017 01:30 PM     LIGHT PSEUDOMONAS AERUGINOSA (2ND STRAIN/COLONY TYPE)    Culture result:  12/22/2017 01:30 PM     STREPTOCOCCI, BETA HEMOLYTIC GROUP B ISOLATED FROM THIO BROTH ONLY Penicillin and ampicillin are drugs of choice for treatment of beta-hemolytic streptococcal infections. Susceptibility testing of penicillins and beta-lactams approved by the FDA for treatment of beta-hemolytic streptococcal infections need not be performed routinely, because nonsusceptible isolates are extremely rare. CLSI 2012    Culture result: MODERATE DIPHTHEROIDS 12/22/2017 01:30 PM    Culture result:  12/22/2017 01:30 PM     ENTEROCOCCUS FAECALIS GROUP D ISOLATED FROM THIO BROTH ONLY          XR Results (most recent):    Results from Hospital Encounter encounter on 12/21/17   XR FOOT LT MIN 3 V   Narrative EXAM:  XR FOOT LT MIN 3 V    INDICATION:   Left foot wound. COMPARISON:  None. FINDINGS:  Three views of the left foot demonstrate no definite fracture or bony  destructive lesion. There is osteopenia. Study is mildly limited by  positioning. . . Impression IMPRESSION:  No acute abnormality identified. .              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 Cellulitis of lower extremity, unspecified laterality L03.119 682.6 HYDROmorphone (DILAUDID) 8 mg tablet      DISCONTINUED: HYDROmorphone (DILAUDID) 8 mg tablet   2. 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 Moderate episode of recurrent major depressive disorder (HCC) F33.1 296.32    4. Long term current use of methadone for pain control Z79.891 V58.69    5. Physical debility R53.81 799.3        I have discussed the diagnosis with the patient and the intended plan as seen in the above orders. I have discussed medication side effects and warnings with the patient as well. Reviewed test results at length with patient    Anti-infectives:   S/p Vancomycin IV x 5 D    Pip- tazobactam IV D14  .   Emmie Mann MD [address]

## 2018-01-05 NOTE — INTERDISCIPLINARY ROUNDS
Pickup time for discharge has been pushed back until approximately 1700 r/t determining pain management plan. Report given to EMERY Chen.

## 2018-01-05 NOTE — PROGRESS NOTES
Pt has been accepted at [facility]. [facility] has been set up for pt to be transported via wheelchair van at 3:30 p.m. Nursing to call report to [phone], room 111B. Cm spoke with pt's caregiver on the phone per pt's verbal consent and ensured that pt's care needs would be taken care of at [facility]. CM to continue to remain available for discharge planning as needed. Care Management Interventions  PCP Verified by CM:  Yes  Mode of Transport at Discharge: [address] (CM Consult): [address]  Discharge Durable Medical Equipment: No  Physical Therapy Consult: Yes  Occupational Therapy Consult: Yes  Speech Therapy Consult: No  Current Support Network: Own Home  Confirm Follow Up Transport: Self (Pt drives but has supportive family to assist if needed)  Plan discussed with Pt/Family/Caregiver: Yes  Discharge Location  Discharge Placement: [address] ([address]) ([address])    JUVENCIO Giang  [address]  [phone]

## 2018-01-05 NOTE — PROGRESS NOTES
Problem: Falls - Risk of  Goal: *Absence of Falls  Document Jordon Fall Risk and appropriate interventions in the flowsheet.    Outcome: Progressing Towards Goal  Fall Risk Interventions:  Mobility Interventions: Assess mobility with egress test, Communicate number of staff needed for ambulation/transfer, OT consult for ADLs, Patient to call before getting OOB, PT Consult for mobility concerns, PT Consult for assist device competence, Strengthening exercises (ROM-active/passive), Utilize gait belt for transfers/ambulation, Utilize walker, cane, or other assitive device    Mentation Interventions: Adequate sleep, hydration, pain control, Door open when patient unattended, Evaluate medications/consider consulting pharmacy, Update white board, Toileting rounds, Room close to nurse's station    Medication Interventions: Assess postural VS orthostatic hypotension, Evaluate medications/consider consulting pharmacy, Patient to call before getting OOB, Teach patient to arise slowly, Utilize gait belt for transfers/ambulation    Elimination Interventions: Call light in reach, Patient to call for help with toileting needs, Toilet paper/wipes in reach, Toileting schedule/hourly rounds, Urinal in reach

## 2018-01-06 LAB
ANION GAP SERPL CALC-SCNC: 6 MMOL/L (ref 5–15)
BUN SERPL-MCNC: 13 MG/DL (ref 6–20)
BUN/CREAT SERPL: 20 (ref 12–20)
CALCIUM SERPL-MCNC: 9.1 MG/DL (ref 8.5–10.1)
CHLORIDE SERPL-SCNC: 103 MMOL/L (ref 97–108)
CO2 SERPL-SCNC: 29 MMOL/L (ref 21–32)
CREAT SERPL-MCNC: 0.64 MG/DL (ref 0.7–1.3)
GLUCOSE SERPL-MCNC: 111 MG/DL (ref 65–100)
POTASSIUM SERPL-SCNC: 3.8 MMOL/L (ref 3.5–5.1)
SODIUM SERPL-SCNC: 138 MMOL/L (ref 136–145)

## 2018-01-06 PROCEDURE — 74011250637 HC RX REV CODE- 250/637: Performed by: HOSPITALIST

## 2018-01-06 PROCEDURE — 74011250637 HC RX REV CODE- 250/637: Performed by: INTERNAL MEDICINE

## 2018-01-06 PROCEDURE — 80048 BASIC METABOLIC PNL TOTAL CA: CPT | Performed by: INTERNAL MEDICINE

## 2018-01-06 PROCEDURE — 65660000000 HC RM CCU STEPDOWN

## 2018-01-06 PROCEDURE — 74011250636 HC RX REV CODE- 250/636: Performed by: INTERNAL MEDICINE

## 2018-01-06 PROCEDURE — 36415 COLL VENOUS BLD VENIPUNCTURE: CPT | Performed by: INTERNAL MEDICINE

## 2018-01-06 RX ORDER — METHADONE HYDROCHLORIDE 10 MG/1
80 TABLET ORAL DAILY
Status: DISCONTINUED | OUTPATIENT
Start: 2018-01-07 | End: 2018-01-13 | Stop reason: HOSPADM

## 2018-01-06 RX ADMIN — Medication 1 CAPSULE: at 09:04

## 2018-01-06 RX ADMIN — LISINOPRIL 40 MG: 20 TABLET ORAL at 09:05

## 2018-01-06 RX ADMIN — HYDROMORPHONE HYDROCHLORIDE 8 MG: 2 TABLET ORAL at 12:40

## 2018-01-06 RX ADMIN — FAMOTIDINE 20 MG: 20 TABLET ORAL at 09:05

## 2018-01-06 RX ADMIN — METHADONE HYDROCHLORIDE 70 MG: 10 TABLET ORAL at 09:04

## 2018-01-06 RX ADMIN — Medication 10 ML: at 06:36

## 2018-01-06 RX ADMIN — COLCHICINE 0.6 MG: 0.6 TABLET, FILM COATED ORAL at 17:58

## 2018-01-06 RX ADMIN — Medication 10 ML: at 00:41

## 2018-01-06 RX ADMIN — DOCUSATE SODIUM 100 MG: 100 CAPSULE, LIQUID FILLED ORAL at 09:05

## 2018-01-06 RX ADMIN — LEVOTHYROXINE SODIUM 75 MCG: 75 TABLET ORAL at 06:36

## 2018-01-06 RX ADMIN — HYDROMORPHONE HYDROCHLORIDE 8 MG: 2 TABLET ORAL at 06:36

## 2018-01-06 RX ADMIN — DOCUSATE SODIUM 100 MG: 100 CAPSULE, LIQUID FILLED ORAL at 17:36

## 2018-01-06 RX ADMIN — FAMOTIDINE 20 MG: 20 TABLET ORAL at 17:36

## 2018-01-06 RX ADMIN — HYDROMORPHONE HYDROCHLORIDE 8 MG: 2 TABLET ORAL at 00:41

## 2018-01-06 RX ADMIN — Medication 10 ML: at 15:22

## 2018-01-06 RX ADMIN — ASPIRIN 81 MG 81 MG: 81 TABLET ORAL at 09:05

## 2018-01-06 RX ADMIN — AMLODIPINE BESYLATE 5 MG: 5 TABLET ORAL at 09:05

## 2018-01-06 RX ADMIN — HYDROMORPHONE HYDROCHLORIDE 8 MG: 2 TABLET ORAL at 17:58

## 2018-01-06 RX ADMIN — ENOXAPARIN SODIUM 40 MG: 40 INJECTION SUBCUTANEOUS at 16:05

## 2018-01-06 NOTE — PROGRESS NOTES
General Surgery End of Shift Nursing Note    Bedside shift change report given to [facility] (oncoming nurse) by Elan Townsend (offgoing nurse). Report included the following information SBAR, Kardex, Intake/Output and MAR. Shift worked:   11pm-7am   Summary of shift:       Issues for physician to address:        Number times ambulated in hallway past shift:     Number of times OOB to chair past shift:     Pain Management:  Current medication: Dilaudid  Patient states pain is manageable on current pain medication: YES    GI:    Current diet:  DIET REGULAR    Tolerating current diet: YES  Passing flatus: YES  Last Bowel Movement:    Appearance:       Patient Safety:    Falls Score: 2  Bed Alarm On? No  Sitter?  No    Rebekah Anastacio Macario RN

## 2018-01-06 NOTE — PROGRESS NOTES
Hospitalist Progress Note    NAME: Jose Camilo   :  1965       Assessment / Plan:  LE cellulitis POA with purulent drainage  Chronic non healing L Foot ulcer/wound with cellulitis POA  zosyn day 14 on 2017, now off antibiotics, completed antibiotic course  BC negative  wound cultures: pseudomonas. Enterococcus and Grp B strep in thio broth  MRI left foot :No fluid collection/ osteomyelitis  ain management: appreciated palliative care team greatly  1. Pain level should be based on observation, not patient rating. 2. Increase the Methadone to 80mg/day. 3. Increase the Dilaudid to 8mg PO q. 4 hours prn.  4. Discontinue the IV Dilaudid. 5. When pt is ready for discharge, do not provide any opioid prescriptions. The methadone will prevent withdrawal symptoms when the dilaudid is stopped.    Primary pain specialist: Dr. Juanita Mariscal  Vascular surgery and podiatry help appreciated  Pt seen twice by Dr Antoinette Gama  and as second opinion Dr Nimesh Yo   [place] to have adequate left foot arterial profusion to undergo debridement of tissue  OR for debridement 2017  LE dopplers negative for DVT  Cultures with rare diptheroids and pseudomonas from thio broth  Medically ready for discharge  Need to resolve pain issues at SNF, once resolved, D/C  No IV narcotics, no change in pain regimen     Essential HTN POA/ sinus bradycardia   monitor on tele   Increased home lisinopril this admission, stable electrolytes  BP still uncontrolled, added norvasc  IV hydralazine PRN    Bladder mass   Per pt he was diagnosed with a bladder mass at [facility] per pt  Unable to get records  follow with urology OP as planned previously ( per pt he has appointment)   D/W pt at length    Left Hemiparesis from Old CVA POA    Chronic Pain syndrome on maintenance Methadone at [address] [place] [address] 65 mg daily   Wean to baseline methadone    Hyperlipidemia, not on meds at home     Gout arthritis, cont allopurinol Code Status: Full    Surrogate Decision Maker: sister Davonna Homans # [phone]    DVT Prophylaxis: SQ Lovenox    Baseline: lives alone, ambulating with cane    Body mass index is 27.44 kg/(m^2). Recommended Disposition:   D/w pt dc planning today: awaiting debridement today then once OK with podiatry can be dc      Subjective:     Chief Complaint / Reason for Physician Visit: left foot cellulitis / HTN   \"what is going to happen to my pain meds at the rehab\"  No complaints  Medically ready for discharge, accepted at [facility], but pt very recluctant to go there  [facility] rep says they do not dispense methadone, so discharge delayed  Discussed with RN events overnight. Review of Systems:  Symptom Y/N Comments  Symptom Y/N Comments   Fever/Chills n   Chest Pain n    Poor Appetite    Edema     Cough    Abdominal Pain n    Sputum    Joint Pain     SOB/BECK n   Pruritis/Rash     Nausea/vomit n   Tolerating PT/OT y    Diarrhea n   Tolerating Diet     Constipation n   Other       Could NOT obtain due to:      Objective:     VITALS:   Last 24hrs VS reviewed since prior progress note. Most recent are:  Patient Vitals for the past 24 hrs:   Temp Pulse Resp BP SpO2   01/03/18 1503 98.2 °F (36.8 °C) (!) 58 18 136/78 95 %   01/03/18 1136 98.4 °F (36.9 °C) (!) 56 18 142/87 96 %       Intake/Output Summary (Last 24 hours) at 01/05/18 2204  Last data filed at 01/05/18 1050   Gross per 24 hour   Intake                0 ml   Output             1100 ml   Net            -1100 ml        PHYSICAL EXAM:  General: WD, WN. Alert, cooperative, no acute distress    EENT:  EOMI. Anicteric sclerae. MMM  Resp:  CTA bilaterally, no wheezing or rales. No accessory muscle use  CV:  Regular  rhythm,  R LE swelling/erythema - resolved; L foot swelling better     GI:  Soft, Non distended, Non tender.  +Bowel sounds  Neurologic:  Alert and oriented X 3, normal speech  Psych:   Good insight. Not anxious nor agitated  Skin:  No rashes. No jaundice. Saw foot today, looks better, see photo in AM                                                                        12/22 12/26 1/5/2017              Reviewed most current lab test results and cultures  YES  Reviewed most current radiology test results   YES  Review and summation of old records today    NO  Reviewed patient's current orders and MAR    YES  PMH/SH reviewed - no change compared to H&P  ________________________________________________________________________  Care Plan discussed with:    Comments   Patient y    Family      RN y    Care Manager y    Consultant                        Multidiciplinary team rounds were held today with , nursing, pharmacist and clinical coordinator. Patient's plan of care was discussed; medications were reviewed and discharge planning was addressed. _______________________________________________________________________  Total NON critical care TIME:  25 Minutes    Total CRITICAL CARE TIME Spent:   Minutes non procedure based      Comments   >50% of visit spent in counseling and coordination of care     ________________________________________________________________________  Horris Pallas, MD     Procedures: see electronic medical records for all procedures/Xrays and details which were not copied into this note but were reviewed prior to creation of Plan. LABS:  I reviewed today's most current labs and imaging studies.   Pertinent labs include:  Recent Labs      01/03/18   0403   WBC  9.4   HGB  11.9*   HCT  37.9   PLT  443*     Recent Labs       Signed: Horris Pallas, MD

## 2018-01-06 NOTE — PROGRESS NOTES
Hospitalist Progress Note    NAME: Pierre Muñoz   :  1965       Assessment / Plan:  LE cellulitis POA with purulent drainage  Chronic non healing left foot ulcer/wound with cellulitis POA  zosyn day 14 on 2017, now off antibiotics, completed antibiotic course  BC negative  wound cultures: pseudomonas. Enterococcus and Grp B strep in thio broth  MRI left foot :No fluid collection/ osteomyelitis  ain management: appreciated palliative care team greatly  1. Pain level should be based on observation, not patient rating. 2. Increase the Methadone to 80mg/day. 3. Increase the Dilaudid to 8mg PO q. 4 hours prn.  4. Discontinue the IV Dilaudid. 5. When pt is ready for discharge, do not provide any opioid prescriptions. The methadone will prevent withdrawal symptoms when the dilaudid is stopped.    Primary pain specialist: Dr. Esha Mcduffie  Vascular surgery and podiatry help appreciated  Pt seen twice by Dr Leland Wilkinson  and as second opinion Dr Maksim Cabral   [place] to have adequate left foot arterial profusion to undergo debridement of tissue  OR for debridement 2017  LE dopplers negative for DVT  Cultures with rare diptheroids and pseudomonas from thio broth  Medically ready for discharge  No IV narcotics, no change in pain regimen  Methadone clinic trying to get arrangements to get methadone at SNF    Essential HTN POA/ sinus bradycardia   monitor on tele   Increased home lisinopril this admission, stable electrolytes  BP still uncontrolled, added norvasc  IV hydralazine PRN    Bladder mass   Per pt he was diagnosed with a bladder mass at [facility] per pt  Unable to get records  follow with urology OP as planned previously ( per pt he has appointment)   D/W pt at length    Left Hemiparesis from Old CVA POA    Chronic Pain syndrome on maintenance Methadone at [address] [place] [address] 65 mg daily   Wean to baseline methadone    Hyperlipidemia, not on meds at home     Gout arthritis, cont allopurinol     Code Status: Full    Surrogate Decision Maker: sister Jose May # [phone]    DVT Prophylaxis: SQ Lovenox    Baseline: lives alone, ambulating with cane    Body mass index is 27.44 kg/(m^2). Recommended Disposition:   D/w pt dc planning today: awaiting debridement today then once OK with podiatry can be dc      Subjective:     Chief Complaint / Reason for Physician Visit: left foot cellulitis / HTN   \"My foot hurts\"  No complaints  Discharge to [place] held up due to lack of pain control  Discussed with RN events overnight. Review of Systems:  Symptom Y/N Comments  Symptom Y/N Comments   Fever/Chills n   Chest Pain n    Poor Appetite    Edema     Cough    Abdominal Pain n    Sputum    Joint Pain     SOB/BECK n   Pruritis/Rash     Nausea/vomit n   Tolerating PT/OT y    Diarrhea n   Tolerating Diet     Constipation n   Other       Could NOT obtain due to:      Objective:     VITALS:   Last 24hrs VS reviewed since prior progress note. Most recent are:  Patient Vitals for the past 24 hrs:   Temp Pulse Resp BP SpO2   01/03/18 1503 98.2 °F (36.8 °C) (!) 58 18 136/78 95 %   01/03/18 1136 98.4 °F (36.9 °C) (!) 56 18 142/87 96 %       Intake/Output Summary (Last 24 hours) at 01/06/18 1756  Last data filed at 01/06/18 7543   Gross per 24 hour   Intake                0 ml   Output              650 ml   Net             -650 ml        PHYSICAL EXAM:  General: WD, WN. Alert, cooperative, no acute distress    EENT:  EOMI. Anicteric sclerae. MMM  Resp:  CTA bilaterally, no wheezing or rales. No accessory muscle use  CV:  Regular  rhythm,  R LE swelling/erythema - resolved; L foot swelling better     GI:  Soft, Non distended, Non tender.  +Bowel sounds  Neurologic:  Alert and oriented X 3, normal speech  Psych:   Good insight. Not anxious nor agitated  Skin:  No rashes. No jaundice.      Saw foot today, looks better, see photo in AM 12/22 12/26 1/5/2017              Reviewed most current lab test results and cultures  YES  Reviewed most current radiology test results   YES  Review and summation of old records today    NO  Reviewed patient's current orders and MAR    YES  PMH/SH reviewed - no change compared to H&P  ________________________________________________________________________  Care Plan discussed with:    Comments   Patient y    Family      RN y    Care Manager y    Consultant                        Multidiciplinary team rounds were held today with , nursing, pharmacist and clinical coordinator. Patient's plan of care was discussed; medications were reviewed and discharge planning was addressed. _______________________________________________________________________  Total NON critical care TIME:  25 Minutes    Total CRITICAL CARE TIME Spent:   Minutes non procedure based      Comments   >50% of visit spent in counseling and coordination of care     ________________________________________________________________________  Chuckie Murdock MD     Procedures: see electronic medical records for all procedures/Xrays and details which were not copied into this note but were reviewed prior to creation of Plan. LABS:  I reviewed today's most current labs and imaging studies. Pertinent labs include:  No results for input(s): WBC, HGB, HCT, PLT, HGBEXT, HCTEXT, PLTEXT, HGBEXT, HCTEXT, PLTEXT in the last 72 hours.   Recent Labs       Signed: Chuckie Murdock MD

## 2018-01-06 NOTE — ROUTINE PROCESS
Bedside and Verbal shift change report given to Delvis Martin (oncoming nurse) by Chester Raya RN (offgoing nurse). Report included the following information SBAR, Kardex, Intake/Output, MAR and Recent Results.

## 2018-01-07 LAB
ANION GAP SERPL CALC-SCNC: 8 MMOL/L (ref 5–15)
BUN SERPL-MCNC: 14 MG/DL (ref 6–20)
BUN/CREAT SERPL: 25 (ref 12–20)
CALCIUM SERPL-MCNC: 8.8 MG/DL (ref 8.5–10.1)
CHLORIDE SERPL-SCNC: 106 MMOL/L (ref 97–108)
CO2 SERPL-SCNC: 25 MMOL/L (ref 21–32)
CREAT SERPL-MCNC: 0.56 MG/DL (ref 0.7–1.3)
GLUCOSE SERPL-MCNC: 99 MG/DL (ref 65–100)
POTASSIUM SERPL-SCNC: 4.1 MMOL/L (ref 3.5–5.1)
SODIUM SERPL-SCNC: 139 MMOL/L (ref 136–145)

## 2018-01-07 PROCEDURE — 80048 BASIC METABOLIC PNL TOTAL CA: CPT | Performed by: INTERNAL MEDICINE

## 2018-01-07 PROCEDURE — 74011250637 HC RX REV CODE- 250/637: Performed by: INTERNAL MEDICINE

## 2018-01-07 PROCEDURE — 36415 COLL VENOUS BLD VENIPUNCTURE: CPT | Performed by: INTERNAL MEDICINE

## 2018-01-07 PROCEDURE — 74011000250 HC RX REV CODE- 250: Performed by: INTERNAL MEDICINE

## 2018-01-07 PROCEDURE — 74011250637 HC RX REV CODE- 250/637: Performed by: HOSPITALIST

## 2018-01-07 PROCEDURE — 65270000029 HC RM PRIVATE

## 2018-01-07 PROCEDURE — 74011250636 HC RX REV CODE- 250/636: Performed by: INTERNAL MEDICINE

## 2018-01-07 RX ORDER — LIDOCAINE 40 MG/G
CREAM TOPICAL
Status: COMPLETED | OUTPATIENT
Start: 2018-01-07 | End: 2018-01-07

## 2018-01-07 RX ADMIN — COLCHICINE 0.6 MG: 0.6 TABLET, FILM COATED ORAL at 17:47

## 2018-01-07 RX ADMIN — Medication 10 ML: at 00:05

## 2018-01-07 RX ADMIN — HYDROMORPHONE HYDROCHLORIDE 8 MG: 2 TABLET ORAL at 05:44

## 2018-01-07 RX ADMIN — FAMOTIDINE 20 MG: 20 TABLET ORAL at 09:16

## 2018-01-07 RX ADMIN — HYDROMORPHONE HYDROCHLORIDE 8 MG: 2 TABLET ORAL at 23:34

## 2018-01-07 RX ADMIN — LEVOTHYROXINE SODIUM 75 MCG: 75 TABLET ORAL at 09:16

## 2018-01-07 RX ADMIN — Medication 10 ML: at 14:32

## 2018-01-07 RX ADMIN — ENOXAPARIN SODIUM 40 MG: 40 INJECTION SUBCUTANEOUS at 17:44

## 2018-01-07 RX ADMIN — AMLODIPINE BESYLATE 5 MG: 5 TABLET ORAL at 09:16

## 2018-01-07 RX ADMIN — METHADONE HYDROCHLORIDE 80 MG: 10 TABLET ORAL at 09:17

## 2018-01-07 RX ADMIN — LIDOCAINE: 40 CREAM TOPICAL at 23:33

## 2018-01-07 RX ADMIN — Medication 10 ML: at 23:34

## 2018-01-07 RX ADMIN — DOCUSATE SODIUM 100 MG: 100 CAPSULE, LIQUID FILLED ORAL at 17:42

## 2018-01-07 RX ADMIN — FAMOTIDINE 20 MG: 20 TABLET ORAL at 17:42

## 2018-01-07 RX ADMIN — HYDROMORPHONE HYDROCHLORIDE 8 MG: 2 TABLET ORAL at 00:05

## 2018-01-07 RX ADMIN — Medication 1 CAPSULE: at 09:15

## 2018-01-07 RX ADMIN — POLYETHYLENE GLYCOL 3350 17 G: 17 POWDER, FOR SOLUTION ORAL at 09:21

## 2018-01-07 RX ADMIN — ALLOPURINOL 100 MG: 100 TABLET ORAL at 09:15

## 2018-01-07 RX ADMIN — HYDROMORPHONE HYDROCHLORIDE 8 MG: 2 TABLET ORAL at 11:43

## 2018-01-07 RX ADMIN — HYDROMORPHONE HYDROCHLORIDE 8 MG: 2 TABLET ORAL at 17:42

## 2018-01-07 RX ADMIN — LISINOPRIL 40 MG: 20 TABLET ORAL at 09:17

## 2018-01-07 RX ADMIN — Medication 10 ML: at 05:45

## 2018-01-07 RX ADMIN — ASPIRIN 81 MG 81 MG: 81 TABLET ORAL at 09:15

## 2018-01-07 RX ADMIN — COLCHICINE 0.6 MG: 0.6 TABLET, FILM COATED ORAL at 09:23

## 2018-01-07 RX ADMIN — DOCUSATE SODIUM 100 MG: 100 CAPSULE, LIQUID FILLED ORAL at 09:16

## 2018-01-07 NOTE — PROGRESS NOTES
Hospitalist Progress Note    NAME: David Cerda   :  1965       Assessment / Plan:  LE cellulitis POA with purulent drainage  Chronic non healing left foot ulcer/wound with cellulitis POA  Completed 14 days IV zosyn on 2017, now off antibiotics  BC negative  wound cultures: pseudomonas. Enterococcus and Grp B strep in thio broth  MRI left foot :No fluid collection/ osteomyelitis  Pain management: appreciated palliative care team greatly  1. Pain level should be based on observation, not patient rating. 2. Increase the Methadone to 80mg/day. 3. Increase the Dilaudid to 8mg PO q. 4 hours prn.  4. Discontinue the IV Dilaudid. 5. When pt is ready for discharge, do not provide any opioid prescriptions. The methadone will prevent withdrawal symptoms when the dilaudid is stopped. Primary pain specialist: Dr. Pacheco Duron\"s help, she worked out pain schedule with clinic  LE dopplers negative for DVT  Vascular surgery and podiatry help appreciated  Pt seen twice by Dr Armida York  and as second opinion Dr Sandhya Weir   Felt to have adequate left foot arterial profusion to undergo debridement of tissue  OR for debridement 2017        Cultures with rare diptheroids and pseudomonas from thio broth  Medically ready for discharge  No IV narcotics, no change in pain regimen  Methadone clinic trying to get arrangements to get methadone at SNF vs home with home health.      Essential HTN POA/ sinus bradycardia   monitor on tele   Increased home lisinopril this admission, stable electrolytes  BP still uncontrolled, added norvasc  IV hydralazine PRN    Bladder mass   Per pt he was diagnosed with a bladder mass at [facility] per pt  Unable to get records  follow with urology OP as planned previously ( per pt he has appointment)   D/W pt at length    Left Hemiparesis from Old CVA POA    Chronic Pain syndrome on maintenance Methadone at [address] [place] [address] 65 mg daily Wean to baseline methadone    Hyperlipidemia, not on meds at home     Gout arthritis, cont allopurinol     Code Status: Full    Surrogate Decision Maker: sister Heather Fuller # [phone]    DVT Prophylaxis: SQ Lovenox    Baseline: lives alone, ambulating with cane    Body mass index is 27.44 kg/(m^2). Recommended Disposition: SNF vs home health     Subjective:     Chief Complaint / Reason for Physician Visit: left foot cellulitis / HTN   \"same old-same old\"  No new complaints  Ready for discharge, D/C to SNF Friday complicated by lack of ability to get methadone at SNF  NS says walking in room, refusing labs and vitals  Discussed with RN events overnight. Review of Systems:  Symptom Y/N Comments  Symptom Y/N Comments   Fever/Chills n   Chest Pain n    Poor Appetite    Edema     Cough    Abdominal Pain n    Sputum    Joint Pain     SOB/BECK n   Pruritis/Rash     Nausea/vomit n   Tolerating PT/OT y    Diarrhea n   Tolerating Diet     Constipation n   Other       Could NOT obtain due to:      Objective:     VITALS:   Last 24hrs VS reviewed since prior progress note. Most recent are:  Patient Vitals for the past 24 hrs:   Temp Pulse Resp BP SpO2   01/03/18 1503 98.2 °F (36.8 °C) (!) 58 18 136/78 95 %   01/03/18 1136 98.4 °F (36.9 °C) (!) 56 18 142/87 96 %       Intake/Output Summary (Last 24 hours) at 01/07/18 1401  Last data filed at 01/06/18 2304   Gross per 24 hour   Intake                0 ml   Output              400 ml   Net             -400 ml        PHYSICAL EXAM:  General: WD, WN. Alert, cooperative, no acute distress    CV:  Regular  rhythm,  R LE swelling/erythema - resolved; L foot swelling better     GI:  Soft, Non distended, Non tender.  +Bowel sounds  Neurologic:  Alert and oriented X 3, normal speech  Psych:   Good insight. Not anxious nor agitated  Skin:  No jaundice.                                                                         12/22 12/26 1/5/2017              Reviewed most current lab test results and cultures  YES  Reviewed most current radiology test results   YES  Review and summation of old records today    NO  Reviewed patient's current orders and MAR    YES  PMH/SH reviewed - no change compared to H&P  ________________________________________________________________________  Care Plan discussed with:    Comments   Patient y    Family      RN y    Care Manager y    Consultant                        Multidiciplinary team rounds were held today with , nursing, pharmacist and clinical coordinator. Patient's plan of care was discussed; medications were reviewed and discharge planning was addressed. _______________________________________________________________________  Total NON critical care TIME:  15 Minutes    Total CRITICAL CARE TIME Spent:   Minutes non procedure based      Comments   >50% of visit spent in counseling and coordination of care     ________________________________________________________________________  Leslie Doss MD     Procedures: see electronic medical records for all procedures/Xrays and details which were not copied into this note but were reviewed prior to creation of Plan. LABS:  I reviewed today's most current labs and imaging studies. Pertinent labs include:  No results for input(s): WBC, HGB, HCT, PLT, HGBEXT, HCTEXT, PLTEXT, HGBEXT, HCTEXT, PLTEXT in the last 72 hours.   Recent Labs       Signed: Leslie Doss MD

## 2018-01-07 NOTE — PROGRESS NOTES
Left foot dressing changed per order. Small amount yellowish drainage noted, skin around healing ulcer looks less re and angry. Patient tolerated procedure well with the use of lidocaine ointment application. Encouraged patient to keep leg elevated as much as possible on pillow. Will request for more lidocaine ointment for future dressing changes.

## 2018-01-08 LAB
ANION GAP SERPL CALC-SCNC: 7 MMOL/L (ref 5–15)
BUN SERPL-MCNC: 14 MG/DL (ref 6–20)
BUN/CREAT SERPL: 26 (ref 12–20)
CALCIUM SERPL-MCNC: 9.1 MG/DL (ref 8.5–10.1)
CHLORIDE SERPL-SCNC: 104 MMOL/L (ref 97–108)
CO2 SERPL-SCNC: 28 MMOL/L (ref 21–32)
CREAT SERPL-MCNC: 0.53 MG/DL (ref 0.7–1.3)
GLUCOSE SERPL-MCNC: 105 MG/DL (ref 65–100)
POTASSIUM SERPL-SCNC: 4.1 MMOL/L (ref 3.5–5.1)
SODIUM SERPL-SCNC: 139 MMOL/L (ref 136–145)

## 2018-01-08 PROCEDURE — 36415 COLL VENOUS BLD VENIPUNCTURE: CPT | Performed by: INTERNAL MEDICINE

## 2018-01-08 PROCEDURE — 74011250636 HC RX REV CODE- 250/636: Performed by: INTERNAL MEDICINE

## 2018-01-08 PROCEDURE — 65270000029 HC RM PRIVATE

## 2018-01-08 PROCEDURE — 74011250637 HC RX REV CODE- 250/637: Performed by: INTERNAL MEDICINE

## 2018-01-08 PROCEDURE — 80048 BASIC METABOLIC PNL TOTAL CA: CPT | Performed by: INTERNAL MEDICINE

## 2018-01-08 PROCEDURE — 74011250637 HC RX REV CODE- 250/637: Performed by: HOSPITALIST

## 2018-01-08 RX ORDER — HYDROMORPHONE HYDROCHLORIDE 2 MG/1
6 TABLET ORAL
Status: DISCONTINUED | OUTPATIENT
Start: 2018-01-08 | End: 2018-01-10

## 2018-01-08 RX ORDER — ACETAMINOPHEN 325 MG/1
650 TABLET ORAL EVERY 6 HOURS
Status: DISPENSED | OUTPATIENT
Start: 2018-01-08 | End: 2018-01-10

## 2018-01-08 RX ORDER — IBUPROFEN 400 MG/1
400 TABLET ORAL 3 TIMES DAILY
Status: DISPENSED | OUTPATIENT
Start: 2018-01-08 | End: 2018-01-10

## 2018-01-08 RX ADMIN — DOCUSATE SODIUM 100 MG: 100 CAPSULE, LIQUID FILLED ORAL at 08:39

## 2018-01-08 RX ADMIN — ACETAMINOPHEN 650 MG: 325 TABLET ORAL at 14:40

## 2018-01-08 RX ADMIN — COLCHICINE 0.6 MG: 0.6 TABLET, FILM COATED ORAL at 08:38

## 2018-01-08 RX ADMIN — ALLOPURINOL 100 MG: 100 TABLET ORAL at 08:39

## 2018-01-08 RX ADMIN — Medication 1 CAPSULE: at 08:39

## 2018-01-08 RX ADMIN — IBUPROFEN 400 MG: 400 TABLET, FILM COATED ORAL at 17:35

## 2018-01-08 RX ADMIN — FAMOTIDINE 20 MG: 20 TABLET ORAL at 17:35

## 2018-01-08 RX ADMIN — METHADONE HYDROCHLORIDE 80 MG: 10 TABLET ORAL at 08:39

## 2018-01-08 RX ADMIN — HYDROMORPHONE HYDROCHLORIDE 8 MG: 2 TABLET ORAL at 12:07

## 2018-01-08 RX ADMIN — ASPIRIN 81 MG 81 MG: 81 TABLET ORAL at 08:39

## 2018-01-08 RX ADMIN — ENOXAPARIN SODIUM 40 MG: 40 INJECTION SUBCUTANEOUS at 17:35

## 2018-01-08 RX ADMIN — DOCUSATE SODIUM 100 MG: 100 CAPSULE, LIQUID FILLED ORAL at 17:35

## 2018-01-08 RX ADMIN — HYDROMORPHONE HYDROCHLORIDE 6 MG: 2 TABLET ORAL at 18:48

## 2018-01-08 RX ADMIN — LISINOPRIL 40 MG: 20 TABLET ORAL at 08:39

## 2018-01-08 RX ADMIN — HYDROMORPHONE HYDROCHLORIDE 8 MG: 2 TABLET ORAL at 05:59

## 2018-01-08 RX ADMIN — LEVOTHYROXINE SODIUM 75 MCG: 75 TABLET ORAL at 08:39

## 2018-01-08 RX ADMIN — COLCHICINE 0.6 MG: 0.6 TABLET, FILM COATED ORAL at 18:48

## 2018-01-08 RX ADMIN — FAMOTIDINE 20 MG: 20 TABLET ORAL at 08:39

## 2018-01-08 RX ADMIN — AMLODIPINE BESYLATE 5 MG: 5 TABLET ORAL at 08:39

## 2018-01-08 RX ADMIN — Medication 10 ML: at 12:08

## 2018-01-08 RX ADMIN — POLYETHYLENE GLYCOL 3350 17 G: 17 POWDER, FOR SOLUTION ORAL at 08:39

## 2018-01-08 RX ADMIN — Medication 10 ML: at 05:59

## 2018-01-08 NOTE — PROGRESS NOTES
Bedside shift change report given to Cortney (oncoming nurse) by Lara Avina (offgoing nurse). Report included the following information SBAR, Kardex, Intake/Output, MAR, Accordion and Recent Results.

## 2018-01-08 NOTE — PROGRESS NOTES
Hospitalist Progress Note    NAME: David Cerda   :  1965       Assessment / Plan:  LE cellulitis POA with purulent drainage  Chronic non healing left foot ulcer/wound with cellulitis POA  Completed 14 days IV zosyn on 2017, now off antibiotics  BC negative  wound cultures: pseudomonas. Enterococcus and Grp B strep in thio broth  MRI left foot :No fluid collection/ osteomyelitis  Pain management: appreciated palliative care team greatly  1. Pain level should be based on observation, not patient rating. 2. Increase the Methadone to 80mg/day. 3. Increase the Dilaudid to 8mg PO q. 4 hours prn.  4. Discontinue the IV Dilaudid. 5. When pt is ready for discharge, do not provide any opioid prescriptions. The methadone will prevent withdrawal symptoms when the dilaudid is stopped.   Start to taper dilaudid here, add scheduled tylenol and motrin x 2 days      No IV pain meds or increases in narcotics  Primary pain specialist: Dr. Mary Madden negative for DVT  Vascular surgery and podiatry help appreciated  Pt seen twice by Dr Armida York  and as second opinion Dr Sandhya Weir   Felt to have adequate left foot arterial profusion to undergo debridement of tissue  OR for debridement 2017        Cultures with rare diptheroids and pseudomonas from thio broth  Medically ready for discharge  Methadone clinic trying to get arrangements to get methadone at SNF vs home with home health, needs to work with PT    Essential HTN POA/ sinus bradycardia   monitor on tele   Increased home lisinopril this admission, stable electrolytes  BP still uncontrolled, added norvasc  IV hydralazine PRN    Bladder mass   Per pt he was diagnosed with a bladder mass at [facility] per pt  Unable to get records  follow with urology OP as planned previously ( per pt he has appointment)   D/W pt at length    Left Hemiparesis from Old CVA POA    Chronic Pain syndrome on maintenance Methadone at [facility] 65 mg daily   Wean to baseline methadone per clinic after discharge    Hyperlipidemia, not on meds at home     Gout arthritis, cont allopurinol     Code Status: Full    Surrogate Decision Maker: sister Jose May # [phone]    DVT Prophylaxis: SQ Lovenox    Baseline: lives alone, ambulating with cane    Body mass index is 27.44 kg/(m^2). Recommended Disposition: SNF vs home health     Subjective:     Chief Complaint / Reason for Physician Visit: left foot cellulitis / HTN   \"my foot hurts\"  No new complaints  Ready for discharge medically, plan complicated by methadone use given by [facility]  Has bed if we can get the methadone, Demar Boswell from palliative care has been in contact with clinic   They are working to get state approval  Other option is d/c home if able to walk well enough, last PT evaluation last week recommended SNF/rehab  Work with PT today  Discussed with RN events overnight. Review of Systems:  Symptom Y/N Comments  Symptom Y/N Comments   Fever/Chills n   Chest Pain n    Poor Appetite    Edema     Cough    Abdominal Pain n    Sputum    Joint Pain     SOB/BECK n   Pruritis/Rash     Nausea/vomit n   Tolerating PT/OT y    Diarrhea n   Tolerating Diet     Constipation n   Other       Could NOT obtain due to:      Objective:     VITALS:   Last 24hrs VS reviewed since prior progress note. Most recent are:  Patient Vitals for the past 24 hrs:   Temp Pulse Resp BP SpO2   01/03/18 1503 98.2 °F (36.8 °C) (!) 58 18 136/78 95 %   01/03/18 1136 98.4 °F (36.9 °C) (!) 56 18 142/87 96 %       Intake/Output Summary (Last 24 hours) at 01/08/18 1222  Last data filed at 01/08/18 1045   Gross per 24 hour   Intake              300 ml   Output             2225 ml   Net            -1925 ml        PHYSICAL EXAM:  General: WD, WN.  Alert, cooperative, no acute distress    CV:  Regular  rhythm,  R LE swelling/erythema - resolved; L foot swelling better     GI:  Soft, Non distended, Non tender.  +Bowel sounds  Neurologic:  Alert and oriented X 3, normal speech  Psych:   Good insight. Not anxious nor agitated  Skin:  No jaundice. 12/22 12/26 1/5/2017              Reviewed most current lab test results and cultures  YES  Reviewed most current radiology test results   YES  Review and summation of old records today    NO  Reviewed patient's current orders and MAR    YES  PMH/SH reviewed - no change compared to H&P  ________________________________________________________________________  Care Plan discussed with:    Comments   Patient y    Family      RN y    Care Manager y    Consultant  lorenzo Braswell from palliative care                     Multidiciplinary team rounds were held today with , nursing, pharmacist and clinical coordinator. Patient's plan of care was discussed; medications were reviewed and discharge planning was addressed. _______________________________________________________________________  Total NON critical care TIME:  15 Minutes    Total CRITICAL CARE TIME Spent:   Minutes non procedure based      Comments   >50% of visit spent in counseling and coordination of care     ________________________________________________________________________  Juan Vasquez MD     Procedures: see electronic medical records for all procedures/Xrays and details which were not copied into this note but were reviewed prior to creation of Plan. LABS:  I reviewed today's most current labs and imaging studies. Pertinent labs include:  No results for input(s): WBC, HGB, HCT, PLT, HGBEXT, HCTEXT, PLTEXT, HGBEXT, HCTEXT, PLTEXT in the last 72 hours.   Recent Labs       Signed: Juan Vasquez MD

## 2018-01-08 NOTE — PROGRESS NOTES
Physical Therapy Note    Chart reviewed. Pt cleared by nursing for mobility. Attempted to see patient x 2 this afternoon, however pt declining secondary to wanting to talk on the phone, stating that it \"was his future. \" Spoke with MD and CM regarding POC. Pt refused despite education on the importance of mobilizing for discharge planning. Will defer and follow back tomorrow.     Thank you,  Chris Gallardo, PT, DPT

## 2018-01-08 NOTE — PROGRESS NOTES
CM received information from [place] re: pt's methadone. Per [facility]'s DON, they are unable to give methadone at their facility. Pt is unable to go to [facility] upon discharge due to Methadone barrier. CM notified Attending Lita Torres re: information. CM to await therapy recommendations for further disposition. 16:03  CM met with pt re: disposition. CM informed pt of change in discharge plan. Pt verbalized understanding. CM stressed importance of working with PT/OT for further recommendations. Pt verbalized understanding. Pt requesting CM to send referral to [facility]. CM sent referral to [facility] for consideration via [employer]. CM to continue to offer support, discharge planning as needed. Care Management Interventions  PCP Verified by CM:  Yes  Mode of Transport at Discharge: [address] (CM Consult): [address]  Discharge Durable Medical Equipment: No  Physical Therapy Consult: Yes  Occupational Therapy Consult: Yes  Speech Therapy Consult: No  Current Support Network: Own Home  Confirm Follow Up Transport: Self (Pt drives but has supportive family to assist if needed)  Plan discussed with Pt/Family/Caregiver: Yes  Discharge Location  Discharge Placement: [address] ([address]) ([address])    JUVENCIO Gonzalez  [facility]  [phone]

## 2018-01-09 ENCOUNTER — APPOINTMENT (OUTPATIENT)
Dept: ULTRASOUND IMAGING | Age: 53
DRG: 383 | End: 2018-01-09
Attending: INTERNAL MEDICINE
Payer: MEDICAID

## 2018-01-09 LAB
ALBUMIN SERPL-MCNC: 3.7 G/DL (ref 3.5–5)
ALBUMIN/GLOB SERPL: 1.2 {RATIO} (ref 1.1–2.2)
ALP SERPL-CCNC: 135 U/L (ref 45–117)
ALT SERPL-CCNC: 32 U/L (ref 12–78)
ANION GAP SERPL CALC-SCNC: 7 MMOL/L (ref 5–15)
AST SERPL-CCNC: 14 U/L (ref 15–37)
BASOPHILS # BLD: 0.1 K/UL (ref 0–0.1)
BASOPHILS NFR BLD: 1 % (ref 0–1)
BILIRUB SERPL-MCNC: 0.4 MG/DL (ref 0.2–1)
BUN SERPL-MCNC: 15 MG/DL (ref 6–20)
BUN/CREAT SERPL: 25 (ref 12–20)
CALCIUM SERPL-MCNC: 8.8 MG/DL (ref 8.5–10.1)
CHLORIDE SERPL-SCNC: 103 MMOL/L (ref 97–108)
CO2 SERPL-SCNC: 28 MMOL/L (ref 21–32)
CREAT SERPL-MCNC: 0.61 MG/DL (ref 0.7–1.3)
EOSINOPHIL # BLD: 0.7 K/UL (ref 0–0.4)
EOSINOPHIL NFR BLD: 8 % (ref 0–7)
ERYTHROCYTE [DISTWIDTH] IN BLOOD BY AUTOMATED COUNT: 16 % (ref 11.5–14.5)
GLOBULIN SER CALC-MCNC: 3 G/DL (ref 2–4)
GLUCOSE SERPL-MCNC: 105 MG/DL (ref 65–100)
HCT VFR BLD AUTO: 35.3 % (ref 36.6–50.3)
HGB BLD-MCNC: 11.4 G/DL (ref 12.1–17)
LYMPHOCYTES # BLD: 3.2 K/UL (ref 0.8–3.5)
LYMPHOCYTES NFR BLD: 38 % (ref 12–49)
MCH RBC QN AUTO: 26.8 PG (ref 26–34)
MCHC RBC AUTO-ENTMCNC: 32.3 G/DL (ref 30–36.5)
MCV RBC AUTO: 83.1 FL (ref 80–99)
MONOCYTES # BLD: 0.8 K/UL (ref 0–1)
MONOCYTES NFR BLD: 10 % (ref 5–13)
NEUTS SEG # BLD: 3.6 K/UL (ref 1.8–8)
NEUTS SEG NFR BLD: 43 % (ref 32–75)
PLATELET # BLD AUTO: 308 K/UL (ref 150–400)
POTASSIUM SERPL-SCNC: 4.2 MMOL/L (ref 3.5–5.1)
PROT SERPL-MCNC: 6.7 G/DL (ref 6.4–8.2)
RBC # BLD AUTO: 4.25 M/UL (ref 4.1–5.7)
SODIUM SERPL-SCNC: 138 MMOL/L (ref 136–145)
WBC # BLD AUTO: 8.4 K/UL (ref 4.1–11.1)

## 2018-01-09 PROCEDURE — 74011250637 HC RX REV CODE- 250/637: Performed by: INTERNAL MEDICINE

## 2018-01-09 PROCEDURE — 80053 COMPREHEN METABOLIC PANEL: CPT | Performed by: INTERNAL MEDICINE

## 2018-01-09 PROCEDURE — 97535 SELF CARE MNGMENT TRAINING: CPT | Performed by: OCCUPATIONAL THERAPIST

## 2018-01-09 PROCEDURE — 74011250637 HC RX REV CODE- 250/637: Performed by: HOSPITALIST

## 2018-01-09 PROCEDURE — 97116 GAIT TRAINING THERAPY: CPT

## 2018-01-09 PROCEDURE — 93971 EXTREMITY STUDY: CPT

## 2018-01-09 PROCEDURE — 85025 COMPLETE CBC W/AUTO DIFF WBC: CPT | Performed by: INTERNAL MEDICINE

## 2018-01-09 PROCEDURE — 74011250636 HC RX REV CODE- 250/636: Performed by: INTERNAL MEDICINE

## 2018-01-09 PROCEDURE — 36415 COLL VENOUS BLD VENIPUNCTURE: CPT | Performed by: INTERNAL MEDICINE

## 2018-01-09 PROCEDURE — 65270000029 HC RM PRIVATE

## 2018-01-09 RX ADMIN — IBUPROFEN 400 MG: 400 TABLET, FILM COATED ORAL at 10:06

## 2018-01-09 RX ADMIN — HYDROMORPHONE HYDROCHLORIDE 6 MG: 2 TABLET ORAL at 20:49

## 2018-01-09 RX ADMIN — METHADONE HYDROCHLORIDE 80 MG: 10 TABLET ORAL at 10:05

## 2018-01-09 RX ADMIN — Medication 10 ML: at 01:16

## 2018-01-09 RX ADMIN — Medication 1 CAPSULE: at 10:05

## 2018-01-09 RX ADMIN — FAMOTIDINE 20 MG: 20 TABLET ORAL at 10:06

## 2018-01-09 RX ADMIN — HYDROMORPHONE HYDROCHLORIDE 6 MG: 2 TABLET ORAL at 13:45

## 2018-01-09 RX ADMIN — LEVOTHYROXINE SODIUM 75 MCG: 75 TABLET ORAL at 10:06

## 2018-01-09 RX ADMIN — Medication 10 ML: at 13:45

## 2018-01-09 RX ADMIN — ACETAMINOPHEN 650 MG: 325 TABLET ORAL at 13:45

## 2018-01-09 RX ADMIN — ACETAMINOPHEN 650 MG: 325 TABLET ORAL at 05:46

## 2018-01-09 RX ADMIN — DOCUSATE SODIUM 100 MG: 100 CAPSULE, LIQUID FILLED ORAL at 18:22

## 2018-01-09 RX ADMIN — Medication 10 ML: at 22:00

## 2018-01-09 RX ADMIN — LISINOPRIL 40 MG: 20 TABLET ORAL at 10:06

## 2018-01-09 RX ADMIN — ACETAMINOPHEN 650 MG: 325 TABLET ORAL at 10:06

## 2018-01-09 RX ADMIN — AMLODIPINE BESYLATE 5 MG: 5 TABLET ORAL at 10:06

## 2018-01-09 RX ADMIN — HYDROMORPHONE HYDROCHLORIDE 6 MG: 2 TABLET ORAL at 07:46

## 2018-01-09 RX ADMIN — DOCUSATE SODIUM 100 MG: 100 CAPSULE, LIQUID FILLED ORAL at 10:06

## 2018-01-09 RX ADMIN — Medication 10 ML: at 07:47

## 2018-01-09 RX ADMIN — ALLOPURINOL 100 MG: 100 TABLET ORAL at 10:06

## 2018-01-09 RX ADMIN — ASPIRIN 81 MG 81 MG: 81 TABLET ORAL at 10:06

## 2018-01-09 RX ADMIN — HYDROMORPHONE HYDROCHLORIDE 6 MG: 2 TABLET ORAL at 01:15

## 2018-01-09 RX ADMIN — COLCHICINE 0.6 MG: 0.6 TABLET, FILM COATED ORAL at 18:22

## 2018-01-09 RX ADMIN — FAMOTIDINE 20 MG: 20 TABLET ORAL at 18:22

## 2018-01-09 RX ADMIN — ENOXAPARIN SODIUM 40 MG: 40 INJECTION SUBCUTANEOUS at 18:21

## 2018-01-09 RX ADMIN — POLYETHYLENE GLYCOL 3350 17 G: 17 POWDER, FOR SOLUTION ORAL at 10:04

## 2018-01-09 RX ADMIN — ACETAMINOPHEN 650 MG: 325 TABLET ORAL at 20:49

## 2018-01-09 RX ADMIN — COLCHICINE 0.6 MG: 0.6 TABLET, FILM COATED ORAL at 10:05

## 2018-01-09 RX ADMIN — IBUPROFEN 400 MG: 400 TABLET, FILM COATED ORAL at 18:22

## 2018-01-09 NOTE — PROGRESS NOTES
Attempt to follow up with this palliative consult pt for AMD.  According to RN Myrtle Blood, pt was off the floor for CT of foot.  available for follow up as needed. For Spiritual Care paging please call [phone]). Lidia Love M.Div.   Palliative  Fellow

## 2018-01-09 NOTE — PROGRESS NOTES
CM called [facility] ([phone]) re: referral status. CM left a HIPPA compliant voicemail requesting a return call with direct contact information given. CM to continue to assist with discharge planning as needed. 16:49  CM received return call from [address] [place] [address] re: referral. Per Lamar, pt does not meet inpatient rehabilitation criteria. [address] [place] [address] is declining referral. Lamar stated the pt stated he \"has prostate cancer and is going to move out to [place] with his daughter. \" CM to continue to offer support, discharge planning as needed.      Maggie Klein, MSW  [facility]  [phone]

## 2018-01-09 NOTE — INTERDISCIPLINARY ROUNDS
Bedside interdisciplinary rounds were held today to discuss patient plan of care and outcomes. The following members were present: [address] [place] [address], Pharmacy, Physical Therapy, and Case Management. Plan:  Consult to [facility] placed. Waiting on updated PT/OT notes. Patient has been encouraged to work with PT/OT to facilitate discharge.

## 2018-01-09 NOTE — PROGRESS NOTES
Bedside and Verbal shift change report given to Martin (oncoming nurse) by Magdalene Ferguson (offgoing nurse). Report included the following information SBAR, Kardex, OR Summary, Procedure Summary, Intake/Output, Accordion, Recent Results and Med Rec Status.

## 2018-01-09 NOTE — PROGRESS NOTES
Problem: Self Care Deficits Care Plan (Adult)  Goal: *Acute Goals and Plan of Care (Insert Text)  Occupational Therapy Goals  Initiated 12/29/2017  1. Patient will perform bathing with minimal assistance/contact guard assist within 7 day(s). 1/9/2018 progressing. Continue goal.  2.  Patient will perform upper body dressing and lower body dressing with minimal assistance/contact guard assist within 7 day(s). 1/9/2018 progressing. Continue goal  3. Patient will perform L hand hygiene with supervision/set-up within 7 day(s). 4.  Patient will perform toilet transfers with moderate assistance  within 7 day(s). Progressing, Modify goal-see below  5. Patient will perform all aspects of toileting with minimal assistance/contact guard assist within 7 day(s). 1/9/2018 progressing. Upgrade goal to independence within 7 days. 1/9/2018,,,New goals:  6. Patient will walk into the bathroom to perform toilet transfer with minimal assistance, using best equipment, within 7 days. 7.  Patient will tolerate at least 5 minutes of standing adls within 7 days. 8.  Patient will ambulate into the bathroom to perform standing grooming for at least 5 minutes with minimal assistance within 7 days. Occupational Therapy TREATMENT: WEEKLY REASSESSMENT  Patient: Wesley Sotelo (38 y.o. male)  Date: 1/9/2018  Diagnosis: Bilateral cellulitis of lower leg  HTN (hypertension)  necrotic tissue left foot Bilateral cellulitis of lower leg  Procedure(s) (LRB):  INCISION AND DRAINAGE LEFT FOOT (Left) 11 Days Post-Op  Precautions: WBAT (WITH POST OP SHOE LLE)    ASSESSMENT:  Pt is slowly progressing towards established goals that were reviewed and revised this date. Pt continues to have signficant pain (10/10) during functional mobiltiy in his LLE - he is now 11 days post op. Pt also complains of increased pain when his LLE is in a dependent position when sitting.   Pt is able to manage manipulating his L cast shoe using his R hand only (velcro straps and fit of shoe) as his L hand is generally non functional due to contractures, sensitivity and increased tone from old cva. Pt slowly performs the R hand only fine motor tasks with great patience for completing the task. Pt is able to use his cane to slide his shoe (on the floor)  towards him enough to be able to pick it up from the floor, performing the task without LOB. Pt elected to ambulate into the bathroom demonstrating a strong lean to the Right- needed moderate assistance for safety and also for toilet transfer. Pt was cautioned to call for assistance for returning to chair, but did so independently (unsafely) reporting that he almost fell. Will continue to address safety with pt during hospital stay. Pt will benefit from continued therapy/rehab intensively to increase independence and safety during adls and mobility. Progression toward goals:  []            Improving appropriately and progressing toward goals  [x]            Improving slowly and progressing toward goals  []            Not making progress toward goals and plan of care will be adjusted     PLAN:  Goals have been updated based on progression since last assessment. Patient continues to benefit from skilled intervention to address the above impairments. Continue to follow patient 3 times a week to address goals.   Planned Interventions:  [x]                    Self Care Training                  [x]             Therapeutic Activities  [x]                    Functional Mobility Training    []             Cognitive Retraining  [x]                    Therapeutic Exercises           [x]             Endurance Activities  [x]                    Balance Training                   [x]             Neuromuscular Re-Education  []                    Visual/Perceptual Training     [x]        Home Safety Training  [x]                    Patient Education                 [x]             Family Training/Education  [] Other (comment):  Discharge Recommendations: Intensive Rehab program  Further Equipment Recommendations for Discharge: tbd     SUBJECTIVE:   Patient stated I almost fell.   (pt demonstrates poor safety awareness and did not follow commands for calling for assistance during mobility)    OBJECTIVE DATA SUMMARY:   Cognitive/Behavioral Status:   alert, oriented, cooperative                     Functional Mobility and Transfers for ADLs:  Bed Mobility:     Pt was seated EOB upon arrival  Transfers:   Pt dislikes the gait belt and declined to have it snug against him. He was able to independently loosen it using his R hand. sit to stand :  CGA, once standing increased assistance is needed due to lean to R  Toilet transfer: moderate assistance  Balance:   sitting : good  (static and dynamic)  Standing: fair to poor due to strong lean to the R    ADL Intervention:   Pt used his R hand and at times his teeth to don his L cast shoe  (WBAT with cast shoe on) while seated EOB. Pt able to loosen the gait belt using his R hand that this therapist insisted that he wear for safety. Pt ambulated into the bathroom demonstrating strong lean to R and narrow base of support requiring mod/maximal assistance. He reports that yesterday he made it to the bathroom without assistance. Strongly cautioned pt not to be up without assistance. Pt requested privacy on the toilet. He managed to get himself from the bathroom to the bed using [facility] without knowledge of this therapist and did not pull the cord in the bathroom to indicate need for assistance, demonstrating poor safety awareness and decreased insight into deficits. Pt reported increased pain in LLE post mobility and wished to rest in bed, elevating his LLE which he reports is his doctor's order.                              Barthel Index:  ( Increased score from 50/100 to 55/100)    Bathin  Bladder: 10  Bowels: 10  Groomin  Dressin  Feeding: 10  Mobility: 0  Stairs: 0  Toilet Use: 5  Transfer (Bed to Chair and Back): 10  Total: 55       Barthel and G-code impairment scale:  Percentage of impairment CH  0% CI  1-19% CJ  20-39% CK  40-59% CL  60-79% CM  80-99% CN  100%   Barthel Score 0-100 100 99-80 79-60 59-40 20-39 1-19   0   Barthel Score 0-20 20 17-19 13-16 9-12 5-8 1-4 0      The Barthel ADL Index: Guidelines  1. The index should be used as a record of what a patient does, not as a record of what a patient could do. 2. The main aim is to establish degree of independence from any help, physical or verbal, however minor and for whatever reason. 3. The need for supervision renders the patient not independent. 4. A patient's performance should be established using the best available evidence. Asking the patient, friends/relatives and nurses are the usual sources, but direct observation and common sense are also important. However direct testing is not needed. 5. Usually the patient's performance over the preceding 24-48 hours is important, but occasionally longer periods will be relevant. 6. Middle categories imply that the patient supplies over 50 per cent of the effort. 7. Use of aids to be independent is allowed. Valentina Mei., Barthel, D.W. (1920). Functional evaluation: the Barthel Index. [address] [facility] (14)2. ISAI Boo, Christi Perales., Nahun Pop., [place], [address] [place] [address] (1999). Measuring the change indisability after inpatient rehabilitation; comparison of the responsiveness of the Barthel Index and Functional [place] Measure. Journal of Neurology, Neurosurgery, and Psychiatry, 66(4), 940-054. Liu Hernandez, N.J.A, TRACI Hines.TAQUERIA, & Dale Beaulieu, MArnulfoA. (2004.) Assessment of post-stroke quality of life in cost-effectiveness studies: The usefulness of the Barthel Index and the EuroQoL-5D.  Quality of Life Research, 13, 417-13               Neuro Re-Education:   Pt chooses to use the red foam tubing without the soft cloth covering to maintain good skin integrity due to L hand hypertonicity. He reports that moisture does not build up in his hand. Therapeutic Exercises:   Encouraged pt to sit up periodically during the day and have nursing assist him with all transfers and ambulation. Pain:  Pain Scale 1: Numeric (0 - 10)  Pain Intensity 1: 10 post activity--WBAT during mobility to and from bathroom. Activity Tolerance:   Fair.   Complains of pain post mobility in LLE  After treatment:   [] Patient left in no apparent distress sitting up in chair  [x] Patient left in no apparent distress in bed  [x] Call bell left within reach  [x] Nursing notified  [] Caregiver present  [] Bed alarm activated    COMMUNICATION/COLLABORATION:   The patients plan of care was discussed with: Physical Therapist, Physical Therapy Assistant, Registered Nurse and     ULYSSES Begum/L  Time Calculation: 25 mins

## 2018-01-09 NOTE — PROGRESS NOTES
Problem: Mobility Impaired (Adult and Pediatric)  Goal: *Acute Goals and Plan of Care (Insert Text)  Physical Therapy Goals  Goals reviewed and revised 1/4/2018  1. Patient will move from supine to sit and sit to supine , scoot up and down and roll side to side in bed with independence within 7 day(s). 2.  Patient will transfer from bed to chair and chair to bed with modified independence using the least restrictive device within 7 day(s). 3.  Patient will perform sit to stand with minimal assistance/contact guard assist within 7 day(s). 4.  Patient will ambulate with minimal assistance/contact guard assist for 75 feet with the least restrictive device within 7 day(s). Initiated 12/28/2017  1. Patient will move from supine to sit and sit to supine  and roll side to side in bed with independence within 7 day(s). 2.  Patient will transfer from bed to chair and chair to bed with modified independence using the least restrictive device within 7 day(s). 3.  Patient will perform sit to stand with modified independence within 7 day(s). 4.  Patient will ambulate with supervision/set-up for 50 feet with the least restrictive device within 7 day(s). physical Therapy TREATMENT  Patient: Kike Dietrich (59 y.o. male)  Date: 1/9/2018  Diagnosis: Bilateral cellulitis of lower leg  HTN (hypertension)  necrotic tissue left foot Bilateral cellulitis of lower leg  Procedure(s) (LRB):  INCISION AND DRAINAGE LEFT FOOT (Left) 11 Days Post-Op  Precautions: WBAT (WITH POST OP SHOE LLE)  Chart, physical therapy assessment, plan of care and goals were reviewed. ASSESSMENT:  Pt cleared by nurse to mobilize. Pt received in bed supine. Pt agreeable to therapy. Pt reported foot pain even when just lying in bed. Pt performed all bed mobility at mod I. Pt performed sit to stand transfer at mod A x2. Pt requiring cueing for hand placement due to wanting to hold [address] when trying to stand.  Pt ambulated 8ft with SPC at [facility] A x1 and min A x1. Pt requiring constant cueing to lean on SPC and not try to lean against wall. Pt educated on lowering SPC to help improve mobility but pt refused allowing therapist to lower. Pt reported being in too much pain and needed to sit. Pt declined second bout of ambulation. Pt very unsafe with SPC even with education on how to improve safety. Pt also educated on not getting up without nurse due to unsafe mobility. Pt with understanding but reported they don't come fast enough. Pt will benefit from rehab to improve safe mobility and safety awareness. Progression toward goals:  []      Improving appropriately and progressing toward goals  [x]      Improving slowly and progressing toward goals  []      Not making progress toward goals and plan of care will be adjusted     PLAN:  Patient continues to benefit from skilled intervention to address the above impairments. Continue treatment per established plan of care. Discharge Recommendations:  Rehab  Further Equipment Recommendations for Discharge:  Defer to rehab     SUBJECTIVE:   Patient stated My foot hurts. Margarette Gonzalez    OBJECTIVE DATA SUMMARY:   Critical Behavior:  Neurologic State: Alert  Orientation Level: Oriented X4  Cognition: Follows commands  Safety/Judgement: Awareness of environment, Decreased awareness of need for assistance, Fall prevention, Home safety  Functional Mobility Training:  Bed Mobility:  Rolling: Modified independent  Supine to Sit: Modified independent     Scooting: Modified independent         Transfers:  Sit to Stand: Moderate assistance;Assist x2  Stand to Sit: Minimum assistance                             Balance:  Sitting: Intact  Standing: Impaired; With support  Standing - Static: Poor;Constant support  Standing - Dynamic : Poor  Ambulation/Gait Training:  Distance (ft): 8 Feet (ft)  Assistive Device: Gait belt;Cane, straight  Ambulation - Level of Assistance:  Moderate assistance;Minimal assistance;Assist x2        Gait Abnormalities: Antalgic;Decreased step clearance;Trunk sway increased        Base of Support: Widened  Stance: Left decreased  Speed/Reva: Pace decreased (<100 feet/min)  Step Length: Left shortened;Right shortened  Swing Pattern: Left asymmetrical               Pain:  Pain Scale 1: Numeric (0 - 10)  Pain Intensity 1: 9              Activity Tolerance:   Pt with unsafe mobility and increased pain.    After treatment:   [] Patient left in no apparent distress sitting up in chair  [x] Patient left in no apparent distress in bed  [x] Call bell left within reach  [x] Nursing notified  [] Caregiver present  [] Bed alarm activated    COMMUNICATION/COLLABORATION:   The patients plan of care was discussed with: Registered Nurse    Kendal Arteaga   Time Calculation: 17 mins

## 2018-01-09 NOTE — PROGRESS NOTES
Bedside and Verbal shift change report given to Valentín Araya (oncoming nurse) by Zack Lee RN (offgoing nurse). Report given with SBAR, Kardex, Intake/Output, MAR and Recent Results.

## 2018-01-09 NOTE — PROGRESS NOTES
Hospitalist Progress Note    NAME: [place]   :  1965       Assessment / Plan:  LE cellulitis POA with purulent drainage  Chronic non healing left foot ulcer/wound with cellulitis POA  Completed 14 days IV zosyn on 2017, now off antibiotics  BC negative  wound cultures: pseudomonas. Enterococcus and Grp B strep in thio broth  MRI left foot :No fluid collection/ osteomyelitis  Pain management: appreciated palliative care team greatly  1. Pain level should be based on observation, not patient rating. 2. Increase the Methadone to 80mg/day. 3. Increase the Dilaudid to 8mg PO q. 4 hours prn.  4. Discontinue the IV Dilaudid. 5. When pt is ready for discharge, do not provide any opioid prescriptions. The methadone will prevent withdrawal symptoms when the dilaudid is stopped.   Start to taper dilaudid here, add scheduled tylenol and motrin x 2 days      No IV pain meds or increases in narcotics  Primary pain specialist: Dr. Tomasz Asher negative for DVT  Vascular surgery and podiatry help appreciated  Pt seen twice by Dr Amina Oliveira  and as second opinion Dr Naga Rollins   [place] to have adequate left foot arterial profusion to undergo debridement of tissue  OR for debridement 2017        Cultures with rare diptheroids and pseudomonas from thio broth  Medically ready for discharge  SNF that will take him will not accept methadone  CM trying to get inpatient rehab  Discussed with patient importance of working with PT    Essential HTN POA/ sinus bradycardia   Increased home lisinopril this admission, stable electrolytes  BP still uncontrolled, added norvasc  IV hydralazine PRN    Bladder mass   Per pt he was diagnosed with a bladder mass at [facility] per pt  Unable to get records  follow with urology OP as planned previously ( per pt he has appointment)   D/W pt at length    Left Hemiparesis from Old CVA POA    Chronic Pain syndrome on maintenance Methadone at [facility] 65 mg daily   Wean to baseline methadone per clinic after discharge    Hyperlipidemia, not on meds at home     Gout arthritis, cont allopurinol     Code Status: Full    Surrogate Decision Maker: sister Jose May # [phone]    DVT Prophylaxis: SQ Lovenox    Baseline: lives alone, ambulating with cane    Body mass index is 27.44 kg/(m^2). Recommended Disposition: SNF vs home health     Subjective:     Chief Complaint / Reason for Physician Visit: left foot cellulitis / HTN   \"my foot and left leg hurts\"  Ready for discharge medically, plan complicated by methadone use given by [id number] Peguero and methadone use have blocked option of going to [facility] and other SNFs  Awaiting decision re health source  Says he still have increasing pain in leg foot and calf  Discussed with RN events overnight. Review of Systems:  Symptom Y/N Comments  Symptom Y/N Comments   Fever/Chills n   Chest Pain n    Poor Appetite    Edema     Cough    Abdominal Pain n    Sputum    Joint Pain     SOB/BECK n   Pruritis/Rash     Nausea/vomit n   Tolerating PT/OT y    Diarrhea n   Tolerating Diet     Constipation n   Other       Could NOT obtain due to:      Objective:     VITALS:   Last 24hrs VS reviewed since prior progress note. Most recent are:  Patient Vitals for the past 24 hrs:   Temp Pulse Resp BP SpO2   01/03/18 1503 98.2 °F (36.8 °C) (!) 58 18 136/78 95 %   01/03/18 1136 98.4 °F (36.9 °C) (!) 56 18 142/87 96 %       Intake/Output Summary (Last 24 hours) at 01/09/18 1223  Last data filed at 01/08/18 1805   Gross per 24 hour   Intake                0 ml   Output              375 ml   Net             -375 ml        PHYSICAL EXAM:  General: WD, WN.  Alert, cooperative, no acute distress    CV:  Regular  rhythm,  R LE swelling/erythema - resolved; Left foot swelling better     GI:  Soft, Non distended, Non tender.  +Bowel sounds  Neurologic:  Alert and oriented X 3, normal speech  Psych:   Good insight. Not anxious nor agitated  Skin:  No jaundice. 12/22 12/26 1/5/2017              Reviewed most current lab test results and cultures  YES  Reviewed most current radiology test results   YES  Review and summation of old records today    NO  Reviewed patient's current orders and MAR    YES  PMH/SH reviewed - no change compared to H&P  ________________________________________________________________________  Care Plan discussed with:    Comments   Patient y    Family      RN y    Care Manager y    Consultant  lorenzo Chase from palliative care                     Multidiciplinary team rounds were held today with , nursing, pharmacist and clinical coordinator. Patient's plan of care was discussed; medications were reviewed and discharge planning was addressed. _______________________________________________________________________  Total NON critical care TIME:  15 Minutes    Total CRITICAL CARE TIME Spent:   Minutes non procedure based      Comments   >50% of visit spent in counseling and coordination of care     ________________________________________________________________________  Heydi Armando MD     Procedures: see electronic medical records for all procedures/Xrays and details which were not copied into this note but were reviewed prior to creation of Plan. LABS:  I reviewed today's most current labs and imaging studies.   Pertinent labs include:  Recent Labs      01/09/18   0653   WBC  8.4   HGB  11.4*   HCT  35.3*   PLT  308     Recent Labs       Signed: Heydi Armando MD

## 2018-01-09 NOTE — PROGRESS NOTES
Nutrition Assessment:    INTERVENTIONS/RECOMMENDATIONS:   Continue regular diet    ASSESSMENT:   Chart reviewed. Attempted to visit with pt however off the floor. Per chart review pt has a good appetite. Will follow-up with pt at a later time. Diet Order: Regular  % Eaten:  Patient Vitals for the past 72 hrs:   % Diet Eaten   01/08/18 1000 50 %     Pertinent Medications: [x]Reviewed: colace, pepcid, lactobac, miralax  Pertinent Labs: [x]Reviewed:   Food Allergies: [x]NKFA  []Other   Last BM:  1/5  Edema:      []RUE   []LUE   []RLE   []LLE      Pressure Ulcer:      [] Stage I   [] Stage II   [] Stage III   [] Stage IV      Anthropometrics: Height: 6' 1\" (185.4 cm) Weight: 94.3 kg (208 lb)    IBW (%IBW):   ( ) UBW (%UBW):   (  %)    BMI: Body mass index is 27.44 kg/(m^2). This BMI is indicative of:  []Underweight   []Normal   []Overweight   [] Obesity   [] Extreme Obesity (BMI>40)  Last Weight Metrics:  Weight Loss Metrics 12/21/2017 8/13/2017 1/27/2017 1/6/2017 12/13/2016 10/14/2016 9/10/2016   Today's Wt 208 lb 208 lb 218 lb 9.6 oz 219 lb 231 lb 12.8 oz 224 lb 13.9 oz 239 lb   BMI 27.44 kg/m2 27.44 kg/m2 28.84 kg/m2 28.89 kg/m2 30.58 kg/m2 29.67 kg/m2 31.53 kg/m2       Estimated Nutrition Needs (Based on): 2380 Kcals/day (BMR: 1835 x 1.3) , 75 g (0.8 g/kg) Protein  Carbohydrate: At Least 130 g/day  Fluids: 2380 mL/day or per primary team    NUTRITION DIAGNOSES:   Problem:  No nutritional diagnosis at this time      Etiology: related to       Signs/Symptoms: as evidenced by        NUTRITION INTERVENTIONS:  Meals/Snacks: General/healthful diet                  GOAL:   consume >75% of meals in 5-7 days    NUTRITION MONITORING AND EVALUATION      Food/Nutrient Intake Outcomes:  Total energy intake  Physical Signs/Symptoms Outcomes: Weight/weight change, Electrolyte and renal profile, GI, Glucose profile    Previous Goal Met:   [x] Met              [] Progressing Towards Goal              [] Not Progressing Towards Goal   Previous Recommendations:   [] Implemented          [] Not Implemented          [] Not Applicable    LEARNING NEEDS (Diet, Food/Nutrient-Drug Interaction):    [x] None Identified   [] Identified and Education Provided/Documented   [] Identified and Pt declined/was not appropriate     Cultural, Christianity, OR Ethnic Dietary Needs:    [x] None Identified   [] Identified and Addressed     [x] Interdisciplinary Care Plan Reviewed/Documented    [x] Discharge Planning: general healthy diet   [] Participated in Interdisciplinary Rounds    NUTRITION RISK:    [] High              [] Moderate           [x]  Low  []  Minimal/Uncompromised      Jacqui Qiu RDN  Pager [phone]  Weekend Pager [phone]

## 2018-01-10 PROCEDURE — 74011250637 HC RX REV CODE- 250/637: Performed by: INTERNAL MEDICINE

## 2018-01-10 PROCEDURE — 74011250636 HC RX REV CODE- 250/636: Performed by: INTERNAL MEDICINE

## 2018-01-10 PROCEDURE — 74011250637 HC RX REV CODE- 250/637: Performed by: HOSPITALIST

## 2018-01-10 PROCEDURE — 97116 GAIT TRAINING THERAPY: CPT | Performed by: PHYSICAL THERAPIST

## 2018-01-10 PROCEDURE — 65270000029 HC RM PRIVATE

## 2018-01-10 RX ORDER — HYDROMORPHONE HYDROCHLORIDE 2 MG/1
4 TABLET ORAL
Status: DISCONTINUED | OUTPATIENT
Start: 2018-01-10 | End: 2018-01-11

## 2018-01-10 RX ORDER — ACETAMINOPHEN 325 MG/1
650 TABLET ORAL EVERY 6 HOURS
Status: COMPLETED | OUTPATIENT
Start: 2018-01-10 | End: 2018-01-11

## 2018-01-10 RX ORDER — IBUPROFEN 400 MG/1
400 TABLET ORAL
Status: DISCONTINUED | OUTPATIENT
Start: 2018-01-10 | End: 2018-01-13 | Stop reason: HOSPADM

## 2018-01-10 RX ADMIN — FAMOTIDINE 20 MG: 20 TABLET ORAL at 17:48

## 2018-01-10 RX ADMIN — COLCHICINE 0.6 MG: 0.6 TABLET, FILM COATED ORAL at 17:48

## 2018-01-10 RX ADMIN — LEVOTHYROXINE SODIUM 75 MCG: 75 TABLET ORAL at 10:10

## 2018-01-10 RX ADMIN — AMLODIPINE BESYLATE 5 MG: 5 TABLET ORAL at 10:11

## 2018-01-10 RX ADMIN — ACETAMINOPHEN 650 MG: 325 TABLET ORAL at 10:19

## 2018-01-10 RX ADMIN — Medication 10 ML: at 21:35

## 2018-01-10 RX ADMIN — LISINOPRIL 40 MG: 20 TABLET ORAL at 10:10

## 2018-01-10 RX ADMIN — ACETAMINOPHEN 650 MG: 325 TABLET ORAL at 21:34

## 2018-01-10 RX ADMIN — HYDROMORPHONE HYDROCHLORIDE 6 MG: 2 TABLET ORAL at 12:33

## 2018-01-10 RX ADMIN — ACETAMINOPHEN 650 MG: 325 TABLET ORAL at 14:11

## 2018-01-10 RX ADMIN — HYDROMORPHONE HYDROCHLORIDE 6 MG: 2 TABLET ORAL at 04:51

## 2018-01-10 RX ADMIN — Medication 10 ML: at 15:01

## 2018-01-10 RX ADMIN — Medication 10 ML: at 10:19

## 2018-01-10 RX ADMIN — METHADONE HYDROCHLORIDE 80 MG: 10 TABLET ORAL at 10:10

## 2018-01-10 RX ADMIN — Medication 1 CAPSULE: at 10:10

## 2018-01-10 RX ADMIN — FAMOTIDINE 20 MG: 20 TABLET ORAL at 10:11

## 2018-01-10 RX ADMIN — DOCUSATE SODIUM 100 MG: 100 CAPSULE, LIQUID FILLED ORAL at 10:11

## 2018-01-10 RX ADMIN — IBUPROFEN 400 MG: 400 TABLET, FILM COATED ORAL at 10:11

## 2018-01-10 RX ADMIN — HYDROMORPHONE HYDROCHLORIDE 4 MG: 2 TABLET ORAL at 23:36

## 2018-01-10 RX ADMIN — POLYETHYLENE GLYCOL 3350 17 G: 17 POWDER, FOR SOLUTION ORAL at 10:19

## 2018-01-10 RX ADMIN — ENOXAPARIN SODIUM 40 MG: 40 INJECTION SUBCUTANEOUS at 16:14

## 2018-01-10 RX ADMIN — ASPIRIN 81 MG 81 MG: 81 TABLET ORAL at 10:11

## 2018-01-10 RX ADMIN — ACETAMINOPHEN 650 MG: 325 TABLET ORAL at 02:00

## 2018-01-10 RX ADMIN — HYDROMORPHONE HYDROCHLORIDE 4 MG: 2 TABLET ORAL at 16:12

## 2018-01-10 RX ADMIN — DOCUSATE SODIUM 100 MG: 100 CAPSULE, LIQUID FILLED ORAL at 17:48

## 2018-01-10 RX ADMIN — COLCHICINE 0.6 MG: 0.6 TABLET, FILM COATED ORAL at 10:19

## 2018-01-10 NOTE — PROGRESS NOTES
Problem: Mobility Impaired (Adult and Pediatric)  Goal: *Acute Goals and Plan of Care (Insert Text)  Physical Therapy Goals  Goals reviewed and revised 1/4/2018  1. Patient will move from supine to sit and sit to supine , scoot up and down and roll side to side in bed with independence within 7 day(s). 2.  Patient will transfer from bed to chair and chair to bed with modified independence using the least restrictive device within 7 day(s). 3.  Patient will perform sit to stand with minimal assistance/contact guard assist within 7 day(s). 4.  Patient will ambulate with minimal assistance/contact guard assist for 75 feet with the least restrictive device within 7 day(s). Initiated 12/28/2017  1. Patient will move from supine to sit and sit to supine  and roll side to side in bed with independence within 7 day(s). 2.  Patient will transfer from bed to chair and chair to bed with modified independence using the least restrictive device within 7 day(s). 3.  Patient will perform sit to stand with modified independence within 7 day(s). 4.  Patient will ambulate with supervision/set-up for 50 feet with the least restrictive device within 7 day(s). physical Therapy TREATMENT  Patient: Shannan Thakur (83 y.o. male)  Date: 1/10/2018  Diagnosis: Bilateral cellulitis of lower leg  HTN (hypertension)  necrotic tissue left foot Bilateral cellulitis of lower leg  Procedure(s) (LRB):  INCISION AND DRAINAGE LEFT FOOT (Left) 12 Days Post-Op  Precautions: WBAT (WITH POST OP SHOE LLE)  Chart, physical therapy assessment, plan of care and goals were reviewed. ASSESSMENT:  Patient continues with pain with weight bearing LLE. He has left hemiparesis. He is independent with bed mobility and putting on his right shoe and post op shoe on the LLE. He uses a straight cane and extends it way out to the right side to help take the weigh off the LLE. He is able to hobble for about 6 feet.   He would do well to get a hemiparetic  wheelchair with an elevating leg rest so he could be more mobile. Long distance ambulation does not appear to be feasible due to the pain he has and he has the potential to be independent at the wheelchair level until he can weight bear without pain. Spoke with CM and he is not homeless but to be discharged home with [facility]. Progression toward goals:  []      Improving appropriately and progressing toward goals  [x]      Improving slowly and progressing toward goals  []      Not making progress toward goals and plan of care will be adjusted     PLAN:  Patient continues to benefit from skilled intervention to address the above impairments. Continue treatment per established plan of care. Discharge Recommendations:  Home with home health-discussed with CM. Further Equipment Recommendations for Discharge:  Possibly a hemiparetic wheelchair for home use. SUBJECTIVE:   Patient stated if I had more pain medication I could walk further.     OBJECTIVE DATA SUMMARY:   Critical Behavior:  Neurologic State: Alert  Orientation Level: Oriented X4  Cognition: Appropriate decision making, Appropriate for age attention/concentration, Appropriate safety awareness, Follows commands  Safety/Judgement: Awareness of environment, Decreased awareness of need for assistance, Fall prevention, Home safety  Functional Mobility Training:  Bed Mobility:  Rolling: Modified independent  Supine to Sit: Modified independent     Scooting: Independent         Transfers:  Sit to Stand: Contact guard assistance;Assist x2; Additional time  Stand to Sit: Contact guard assistance;Assist x2; Additional time                             Balance:  Sitting: Intact  Standing: Impaired  Standing - Static: Constant support; Fair  Standing - Dynamic : Fair  Ambulation/Gait Training:  Distance (ft): 8 Feet (ft)  Assistive Device: Gait belt;Cane, straight  Ambulation - Level of Assistance: Minimal assistance;Assist x2; Additional time        Gait Abnormalities: Antalgic        Base of Support: Widened  Stance: Left decreased;Right increased  Speed/Reva: Pace decreased (<100 feet/min)  Step Length: Right shortened;Left shortened                          Pain:  Pain Scale 1: Numeric (0 - 10)  Pain Intensity 1: 10  Pain Location 1: Foot  Pain Orientation 1: Right  Pain Description 1: Aching  Pain Intervention(s) 1: Medication (see MAR)  Activity Tolerance:   Good. Please refer to the flowsheet for vital signs taken during this treatment.   After treatment:   [] Patient left in no apparent distress sitting up in chair  [x] Patient left in no apparent distress in bed  [x] Call bell left within reach  [x] Nursing notified  [] Caregiver present  [] Bed alarm activated    COMMUNICATION/COLLABORATION:   The patients plan of care was discussed with: Registered Nurse,  and Rehabilitation Attendant    Andres Jameson PT   Time Calculation: 17 mins

## 2018-01-10 NOTE — PROGRESS NOTES
Infectious Disease Progress Note        IMPRESSION:   1. Chronic ulcer dorsum of left foot with no evidence of Osteomyelitis, healing well, no slough . 2. Excoraition of surrounding skin  3. B/l  Lower extremity cellulitis resolved. 4. WC + for Pseudomonas x 2 strains from thio broth, diphtheroids ( ) WC on  + for  Pseudomonas x 2 strains. E.faecalis & Gp B beta hemolytic Strep from  thio broth only. PLAN:   1. Completed Pip- tazobactam IV x 14 days   2. Daily wound care  elevate both LE ,clean skin remove excoriating skin   3. Out pt wound care is essential in order for continued wound healing  4. Follow up with Podiatry in 1-2 weeks   5. Follow up with me in 2-3 weeks . ( call my office at [phone] to make appt )       Subjective:     Pt seen . No new complaints. Review of Systems:  A comprehensive review of systems was negative except for that written in the History of Present Illness. Objective:     Blood pressure 125/86, pulse 63, temperature 98.7 °F (37.1 °C), resp. rate 17, height 6' 1\" (1.854 m), weight 94.3 kg (208 lb), SpO2 96 %. Temp (24hrs), Av °F (36.7 °C), Min:97.3 °F (36.3 °C), Max:98.7 °F (37.1 °C)      Lines:  Peripheral IV:            Physical Exam:   General:  Cooperative, awake   Eyes:  Sclera anicteric. Pupils equally round and reactive to light. Mouth/Throat: Mucous membranes normal, oral pharynx clear   Neck: Supple   Lungs:   Clear to auscultation bilaterally, good effort   CV:  Regular rate and rhythm,no murmur, click, rub or gallop   Abdomen:   Soft, non-tender. bowel sounds normal. non-distended   Extremities: B/l lower extremity  No edema , erythema , not warm to touch Wound exam done . Wound healing , no discharge. Surrounding skin excoriation +.                Lines/Devices:  Intact, no erythema, drainage or tenderness     Data Review:   CBC:   Recent Labs      18   0653   WBC  8.4   RBC  4.25   HGB  11.4*   HCT  35.3*   PLT  308   GRANS  43 LYMPH  38   EOS  8*     CMP:   Recent Labs      01/09/18   0653  01/08/18   0410   GLU  105*  105*   NA  138  139   K  4.2  4.1   CL  103  104   CO2  28  28   BUN  15  14   CREA  0.61*  0.53*   CA  8.8  9.1   AGAP  7  7   BUCR  25*  26*   AP  135*   --    TP  6.7   --    ALB  3.7   --    GLOB  3.0   --    AGRAT  1.2   --        Studies:      Lab Results   Component Value Date/Time    Culture result: NO ANAEROBES ISOLATED 12/29/2017 06:45 PM    Culture result:  12/29/2017 06:45 PM     PSEUDOMONAS AERUGINOSA (STRAIN 1) ISOLATED FROM THIO BROTH ONLY    Culture result:  12/29/2017 06:45 PM     PSEUDOMONAS AERUGINOSA (STRAIN 2) ISOLATED FROM THIO BROTH ONLY    Culture result: RARE DIPHTHEROIDS 12/29/2017 06:45 PM    Culture result:  12/22/2017 01:30 PM     LIGHT PSEUDOMONAS AERUGINOSA (1ST STRAIN/COLONY TYPE)    Culture result:  12/22/2017 01:30 PM     LIGHT PSEUDOMONAS AERUGINOSA (2ND STRAIN/COLONY TYPE)    Culture result:  12/22/2017 01:30 PM     STREPTOCOCCI, BETA HEMOLYTIC GROUP B ISOLATED FROM THIO BROTH ONLY Penicillin and ampicillin are drugs of choice for treatment of beta-hemolytic streptococcal infections. Susceptibility testing of penicillins and beta-lactams approved by the FDA for treatment of beta-hemolytic streptococcal infections need not be performed routinely, because nonsusceptible isolates are extremely rare. CLSI 2012    Culture result: MODERATE DIPHTHEROIDS 12/22/2017 01:30 PM    Culture result:  12/22/2017 01:30 PM     ENTEROCOCCUS FAECALIS GROUP D ISOLATED FROM THIO BROTH ONLY          XR Results (most recent):    Results from Hospital Encounter encounter on 12/21/17   XR FOOT LT MIN 3 V   Narrative EXAM:  XR FOOT LT MIN 3 V    INDICATION:   Left foot wound. COMPARISON:  None. FINDINGS:  Three views of the left foot demonstrate no definite fracture or bony  destructive lesion. There is osteopenia. Study is mildly limited by  positioning. . .          Impression IMPRESSION:  No acute abnormality identified. .              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 Cellulitis of lower extremity, unspecified laterality L03.119 682.6 HYDROmorphone (DILAUDID) 8 mg tablet      DISCONTINUED: HYDROmorphone (DILAUDID) 8 mg tablet   2. 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 Moderate episode of recurrent major depressive disorder (HCC) F33.1 296.32    4. Long term current use of methadone for pain control Z79.891 V58.69    5. Physical debility R53.81 799.3        I have discussed the diagnosis with the patient and the intended plan as seen in the above orders. I have discussed medication side effects and warnings with the patient as well. Reviewed test results at length with patient    Anti-infectives:     S/p Vancomycin IV x 5 D  S/p Pip- tazobactam IV x 14D  .   Corinne Kearns, MD [facility]

## 2018-01-10 NOTE — PROGRESS NOTES
Problem: Falls - Risk of  Goal: *Absence of Falls  Document Jordon Fall Risk and appropriate interventions in the flowsheet.    Outcome: Progressing Towards Goal  Fall Risk Interventions:  Mobility Interventions: Patient to call before getting OOB    Mentation Interventions: Door open when patient unattended    Medication Interventions: Patient to call before getting OOB    Elimination Interventions: Urinal in reach

## 2018-01-10 NOTE — PROGRESS NOTES
Bedside and Verbal shift change report given to Gonzalo Cobb (oncoming nurse) by Rivas Gill (offgoing nurse). Report included the following information SBAR, Kardex, ED Summary, OR Summary, Intake/Output, MAR, Accordion and Recent Results.

## 2018-01-11 PROCEDURE — 74011250637 HC RX REV CODE- 250/637: Performed by: INTERNAL MEDICINE

## 2018-01-11 PROCEDURE — 74011250637 HC RX REV CODE- 250/637: Performed by: HOSPITALIST

## 2018-01-11 PROCEDURE — 65270000029 HC RM PRIVATE

## 2018-01-11 RX ORDER — ACETAMINOPHEN 325 MG/1
650 TABLET ORAL
Status: DISCONTINUED | OUTPATIENT
Start: 2018-01-11 | End: 2018-01-13 | Stop reason: HOSPADM

## 2018-01-11 RX ORDER — HYDROMORPHONE HYDROCHLORIDE 2 MG/1
2 TABLET ORAL
Status: DISPENSED | OUTPATIENT
Start: 2018-01-11 | End: 2018-01-12

## 2018-01-11 RX ADMIN — HYDROMORPHONE HYDROCHLORIDE 4 MG: 2 TABLET ORAL at 08:02

## 2018-01-11 RX ADMIN — Medication 1 CAPSULE: at 08:04

## 2018-01-11 RX ADMIN — DOCUSATE SODIUM 100 MG: 100 CAPSULE, LIQUID FILLED ORAL at 18:42

## 2018-01-11 RX ADMIN — ACETAMINOPHEN 650 MG: 325 TABLET ORAL at 08:03

## 2018-01-11 RX ADMIN — LISINOPRIL 40 MG: 20 TABLET ORAL at 08:04

## 2018-01-11 RX ADMIN — Medication 10 ML: at 13:55

## 2018-01-11 RX ADMIN — FAMOTIDINE 20 MG: 20 TABLET ORAL at 18:42

## 2018-01-11 RX ADMIN — LEVOTHYROXINE SODIUM 75 MCG: 75 TABLET ORAL at 08:02

## 2018-01-11 RX ADMIN — ASPIRIN 81 MG 81 MG: 81 TABLET ORAL at 08:04

## 2018-01-11 RX ADMIN — Medication 10 ML: at 06:27

## 2018-01-11 RX ADMIN — DOCUSATE SODIUM 100 MG: 100 CAPSULE, LIQUID FILLED ORAL at 08:03

## 2018-01-11 RX ADMIN — COLCHICINE 0.6 MG: 0.6 TABLET, FILM COATED ORAL at 09:09

## 2018-01-11 RX ADMIN — POLYETHYLENE GLYCOL 3350 17 G: 17 POWDER, FOR SOLUTION ORAL at 08:03

## 2018-01-11 RX ADMIN — HYDROMORPHONE HYDROCHLORIDE 2 MG: 2 TABLET ORAL at 20:45

## 2018-01-11 RX ADMIN — IBUPROFEN 400 MG: 400 TABLET, FILM COATED ORAL at 20:49

## 2018-01-11 RX ADMIN — AMLODIPINE BESYLATE 5 MG: 5 TABLET ORAL at 08:04

## 2018-01-11 RX ADMIN — ACETAMINOPHEN 650 MG: 325 TABLET ORAL at 02:43

## 2018-01-11 RX ADMIN — HYDROMORPHONE HYDROCHLORIDE 4 MG: 2 TABLET ORAL at 13:55

## 2018-01-11 RX ADMIN — METHADONE HYDROCHLORIDE 80 MG: 10 TABLET ORAL at 08:02

## 2018-01-11 RX ADMIN — COLCHICINE 0.6 MG: 0.6 TABLET, FILM COATED ORAL at 19:03

## 2018-01-11 NOTE — PROGRESS NOTES
Return visit to complete an AMD. Pt was laying back in bed. Pt first asked what an AMD was then said he already had one through [facility].  Pt seemed frustrated and distraught that pt was going to be discharged tomorrow and that his foot was going to get infected. Pt expressed concern that he would lose his foot. Pt said he wanted to deal with this himself so  stepped out to honor his wishes. Chiquis Vega M.Div.   Palliative  Fellow

## 2018-01-11 NOTE — PROGRESS NOTES
Bedside and Verbal shift change report given to Burak Hurtado rn (oncoming nurse) by Nick Serna (offgoing nurse). Report included the following information SBAR, Procedure Summary, Intake/Output, MAR and Recent Results.

## 2018-01-11 NOTE — PROGRESS NOTES
Bedside and Verbal shift change report given to Jennifer Handley (oncoming nurse) by SHELLI Arellano RN (offgoing nurse). Report given with SBAR, Kardex, Intake/Output, MAR and Recent Results.

## 2018-01-11 NOTE — PROGRESS NOTES
Infectious Disease Progress Note        IMPRESSION:   1. Chronic ulcer dorsum of left foot with no evidence of Osteomyelitis, healing well, no slough . 2. Excoraition of surrounding skin  3. B/l  Lower extremity cellulitis resolved. 4. WC + for Pseudomonas x 2 strains from thio broth, diphtheroids ( ) WC on  + for  Pseudomonas x 2 strains. E.faecalis & Gp B beta hemolytic Strep from  thio broth only. PLAN:   1. Completed Pip- tazobactam IV x 14 days , awaiting placement  2. Daily wound care  elevate both LE ,appreciate Wound Care recommendations. 3. Out pt wound care is essential in order for continued wound healing  4. Follow up with Podiatry in 1-2 weeks   5. Follow up with me in 2-3 weeks . ( call my office at [phone] to make appt )       Subjective:     Pt seen . No new complaints. Review of Systems:  A comprehensive review of systems was negative except for that written in the History of Present Illness. Objective:     Blood pressure (!) 86/69, pulse (!) 57, temperature 98.6 °F (37 °C), resp. rate 18, height 6' 1\" (1.854 m), weight 94.3 kg (208 lb), SpO2 96 %. Temp (24hrs), Av.4 °F (36.9 °C), Min:98 °F (36.7 °C), Max:98.7 °F (37.1 °C)      Lines:  Peripheral IV:            Physical Exam:   General:  Cooperative, awake   Eyes:  Sclera anicteric. Pupils equally round and reactive to light. Mouth/Throat: Mucous membranes normal, oral pharynx clear   Neck: Supple   Lungs:   Clear to auscultation bilaterally, good effort   CV:  Regular rate and rhythm,no murmur, click, rub or gallop   Abdomen:   Soft, non-tender. bowel sounds normal. non-distended   Extremities: B/l lower extremity  No edema , erythema , not warm to touch Wound exam done . Wound healing , no discharge. Surrounding skin excoriation +.                Lines/Devices:  Intact, no erythema, drainage or tenderness     Data Review:   CBC:   Recent Labs      18   0653   WBC  8.4   RBC  4.25   HGB  11.4*   HCT 35.3*   PLT  308   GRANS  43   LYMPH  38   EOS  8*     CMP:   Recent Labs      01/09/18   0653   GLU  105*   NA  138   K  4.2   CL  103   CO2  28   BUN  15   CREA  0.61*   CA  8.8   AGAP  7   BUCR  25*   AP  135*   TP  6.7   ALB  3.7   GLOB  3.0   AGRAT  1.2       Studies:      Lab Results   Component Value Date/Time    Culture result: NO ANAEROBES ISOLATED 12/29/2017 06:45 PM    Culture result:  12/29/2017 06:45 PM     PSEUDOMONAS AERUGINOSA (STRAIN 1) ISOLATED FROM THIO BROTH ONLY    Culture result:  12/29/2017 06:45 PM     PSEUDOMONAS AERUGINOSA (STRAIN 2) ISOLATED FROM THIO BROTH ONLY    Culture result: RARE DIPHTHEROIDS 12/29/2017 06:45 PM    Culture result:  12/22/2017 01:30 PM     LIGHT PSEUDOMONAS AERUGINOSA (1ST STRAIN/COLONY TYPE)    Culture result:  12/22/2017 01:30 PM     LIGHT PSEUDOMONAS AERUGINOSA (2ND STRAIN/COLONY TYPE)    Culture result:  12/22/2017 01:30 PM     STREPTOCOCCI, BETA HEMOLYTIC GROUP B ISOLATED FROM THIO BROTH ONLY Penicillin and ampicillin are drugs of choice for treatment of beta-hemolytic streptococcal infections. Susceptibility testing of penicillins and beta-lactams approved by the FDA for treatment of beta-hemolytic streptococcal infections need not be performed routinely, because nonsusceptible isolates are extremely rare. CLSI 2012    Culture result: MODERATE DIPHTHEROIDS 12/22/2017 01:30 PM    Culture result:  12/22/2017 01:30 PM     ENTEROCOCCUS FAECALIS GROUP D ISOLATED FROM THIO BROTH ONLY          XR Results (most recent):    Results from Hospital Encounter encounter on 12/21/17   XR FOOT LT MIN 3 V   Narrative EXAM:  XR FOOT LT MIN 3 V    INDICATION:   Left foot wound. COMPARISON:  None. FINDINGS:  Three views of the left foot demonstrate no definite fracture or bony  destructive lesion. There is osteopenia. Study is mildly limited by  positioning. . . Impression IMPRESSION:  No acute abnormality identified. .              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 Cellulitis of lower extremity, unspecified laterality L03.119 682.6 HYDROmorphone (DILAUDID) 8 mg tablet      DISCONTINUED: HYDROmorphone (DILAUDID) 8 mg tablet   2. 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 Moderate episode of recurrent major depressive disorder (HCC) F33.1 296.32    4. Long term current use of methadone for pain control Z79.891 V58.69    5. Physical debility R53.81 799.3        I have discussed the diagnosis with the patient and the intended plan as seen in the above orders. I have discussed medication side effects and warnings with the patient as well. Reviewed test results at length with patient    Anti-infectives:     S/p Vancomycin IV x 5 D  S/p Pip- tazobactam IV x 14D  .   MD Elier Izquierdo

## 2018-01-11 NOTE — WOUND CARE
Wound care nurse was asked by Dr. Caesar Harrington to see this patient again to check his periwound skin. The wound is healing well and nursing is continuing to do the wound care as ordered. The toes need to be  from each other as described in the wound care orders. This should prevent maceration of the skin. Can also apply the Aloe Vesta # 3 ointment to the periwound skin to protect it.    Mellisa Milton RN, BSN, [employer]

## 2018-01-11 NOTE — PROGRESS NOTES
Hospitalist Progress Note    NAME: Oneil Aschoff   :  1965       Assessment / Plan:  LE cellulitis POA with purulent drainage  Chronic non healing left foot ulcer/wound with cellulitis POA  Completed 14 days IV zosyn on 2017, now off antibiotics  BC negative  wound cultures: pseudomonas plus Enterococcus and Grp B strep in thio broth  MRI left foot :No fluid collection/ osteomyelitis  Vascular surgery and podiatry help appreciated, some concern about vascular insufficiency initially  Pt seen twice by Dr Sabra Kaur  and as second opinion Dr Tia Plaza   Alda to have adequate left foot arterial profusion to undergo debridement of tissue  OR for debridement 2017        Cultures with rare diptheroids and pseudomonas from thio broth  Wound care post op, foot looks much improved  Pain management: appreciated palliative care team following  1. Pain level should be based on observation, not patient rating. 2. Increase the Methadone to 80mg/day. 3. Increase the Dilaudid to 8mg PO q. 4 hours prn.  4. Discontinue the IV Dilaudid. 5. When pt is ready for discharge, do not provide any opioid prescriptions. The methadone will prevent withdrawal symptoms when the dilaudid is stopped.   Pt will need to follow up with methadone clinic for further pain med advice  Taper dilaudid to off by AM 2018  LE dopplers negative for DVT  Medically ready for discharge  No SNF will take him between insurance and the methadone use per CM  Denied by acute rehab  Discharge to home with home health likely only option, d/w pt at length, he is agreeable to discharge in AM    Essential HTN POA/ sinus bradycardia   Increased home lisinopril this admission, stable electrolytes  Bp now on lower side, will stop the norvasc  IV hydralazine PRN    Bladder mass   Per pt he was diagnosed with a bladder mass at [facility] per pt  Unable to get records  follow with urology OP as planned previously ( per pt he has appointment)   D/W pt at length    Left Hemiparesis from Old CVA POA    Chronic Pain syndrome on maintenance Methadone at [facility] 65 mg daily  Palliative care spoke with clinic, agreed on PO dilaudid(now weaning off) and increased methadone post op   Weaning off the dilaudid by AM 1/12/2017  Outpatient methadone per clinic at discharge, no narcotic scripts at discharge    Hyperlipidemia, not on meds at home     Gout arthritis, cont allopurinol     Code Status: Full    Surrogate Decision Maker: sister Ivan Gonsales # [phone]    DVT Prophylaxis: SQ Lovenox    Baseline: lives alone, ambulating with cane    Body mass index is 27.44 kg/(m^2). Recommended Disposition: SNF vs home health     Subjective:     Chief Complaint / Reason for Physician Visit: left foot cellulitis / HTN   \"my foot and left leg still hurts\"  Ready for discharge medically, no options for placement  Discussed with RN events overnight. Review of Systems:  Symptom Y/N Comments  Symptom Y/N Comments   Fever/Chills n   Chest Pain n    Poor Appetite    Edema     Cough    Abdominal Pain n    Sputum    Joint Pain     SOB/BECK n   Pruritis/Rash     Nausea/vomit n   Tolerating PT/OT y    Diarrhea n   Tolerating Diet     Constipation n   Other       Could NOT obtain due to:      Objective:     VITALS:   Last 24hrs VS reviewed since prior progress note. Most recent are:  Patient Vitals for the past 24 hrs:   Temp Pulse Resp BP SpO2   01/11/18 1420 98.4 °F (36.9 °C) 79 18 105/71 97 %   01/11/18 1110 98.6 °F (37 °C) (!) 57 18 (!) 86/69 96 %   01/11/18 0546 98.3 °F (36.8 °C) (!) 58 18 107/71 95 %   01/11/18 0243 98 °F (36.7 °C) 60 16 105/69 95 %   01/10/18 2132 98.3 °F (36.8 °C) 60 18 92/69 97 %   01/10/18 1511 98.7 °F (37.1 °C) 63 17 125/86 96 %       PHYSICAL EXAM:  General: WD, WN.  Alert, cooperative, no acute distress    CV:  Regular  rhythm,  GI:  Soft, Non distended, Non tender.  +Bowel sounds  Neurologic:  Alert and oriented X 3, normal speech  Psych:   Good insight. Not anxious nor agitated  Skin:  No jaundice. I viewed the wound yesterday, could not take a picture as bertha was not working for me    Superficial ulcer healing, no cellulitis or purulence                                                                        12/22 12/26 1/5/2017              Reviewed most current lab test results and cultures  YES  Reviewed most current radiology test results   YES  Review and summation of old records today    NO  Reviewed patient's current orders and MAR    YES  PMH/SH reviewed - no change compared to H&P  ________________________________________________________________________  Care Plan discussed with:    Comments   Patient y    Family      RN y    Care Manager y    Consultant                        Multidiciplinary team rounds were held today with , nursing, pharmacist and clinical coordinator. Patient's plan of care was discussed; medications were reviewed and discharge planning was addressed. _______________________________________________________________________  Total NON critical care TIME:  15 Minutes    Total CRITICAL CARE TIME Spent:   Minutes non procedure based      Comments   >50% of visit spent in counseling and coordination of care     ________________________________________________________________________  Jasen Gonzalez MD     Procedures: see electronic medical records for all procedures/Xrays and details which were not copied into this note but were reviewed prior to creation of Plan. LABS:  I reviewed today's most current labs and imaging studies.   Pertinent labs include:  Recent Labs      01/09/18   0653   WBC  8.4   HGB  11.4*   HCT  35.3*   PLT  308     Recent Labs       Signed: Jasen Gonzalez MD

## 2018-01-11 NOTE — PROGRESS NOTES
MARY informed by MD pt to return home with [facility] as no additional resources are available as he does not have SNF benefit with insurance and [facility] to his [facility]adone medication. Pt will return home with [facility]. MARY sent referral for wheelchair to [facility] and [employer] as recommended by PT. MARY will ensure all medical follow up appts made prior to discharge.     Patricia Lam, MSW  Ext [phone]

## 2018-01-11 NOTE — PROGRESS NOTES
Hospitalist Progress Note    NAME: Michelle Roman   :  1965       Assessment / Plan:  LE cellulitis POA with purulent drainage  Chronic non healing left foot ulcer/wound with cellulitis POA  Completed 14 days IV zosyn on 2017, now off antibiotics  BC negative  wound cultures: pseudomonas. Enterococcus and Grp B strep in thio broth  MRI left foot :No fluid collection/ osteomyelitis  Pain management: appreciated palliative care team greatly  1. Pain level should be based on observation, not patient rating. 2. Increase the Methadone to 80mg/day. 3. Increase the Dilaudid to 8mg PO q. 4 hours prn.  4. Discontinue the IV Dilaudid. 5. When pt is ready for discharge, do not provide any opioid prescriptions. The methadone will prevent withdrawal symptoms when the dilaudid is stopped.   Start to taper dilaudid here, add scheduled tylenol and motrin x 2 days      No IV pain meds or increases in narcotics  Primary pain specialist: Dr. Lopez Rolling negative for DVT  Vascular surgery and podiatry help appreciated  Pt seen twice by Dr Rosalia Beauchamp  and as second opinion Dr Dagoberto Black   [place] to have adequate left foot arterial profusion to undergo debridement of tissue  OR for debridement 2017        Cultures with rare diptheroids and pseudomonas from thio broth  Medically ready for discharge  No SNF will take him between insurance and the methadone use per CM  Denied by acute rehab  Discharge to home with home health likely only option  Continue to taper the Dilaudid    Essential HTN POA/ sinus bradycardia   Increased home lisinopril this admission, stable electrolytes  BP still uncontrolled, added norvasc  IV hydralazine PRN    Bladder mass   Per pt he was diagnosed with a bladder mass at [facility] per pt  Unable to get records  follow with urology OP as planned previously ( per pt he has appointment)   D/W pt at length    Left Hemiparesis from Old CVA POA    Chronic Pain syndrome on maintenance Methadone at [facility] 65 mg daily   Wean to baseline methadone per clinic after discharge    Hyperlipidemia, not on meds at home     Gout arthritis, cont allopurinol     Code Status: Full    Surrogate Decision Maker: sister Stefany Fisher # [phone]    DVT Prophylaxis: SQ Lovenox    Baseline: lives alone, ambulating with cane    Body mass index is 27.44 kg/(m^2). Recommended Disposition: SNF vs home health     Subjective:     Chief Complaint / Reason for Physician Visit: left foot cellulitis / HTN   \"my foot and left leg hurts\"  Ready for discharge medically, plan complicated by methadone use given by [id number] Peguero and methadone use have blocked [facility] and other [facility]  Awaiting decision re health source  Says he still have increasing pain in leg foot and calf  Discussed with RN events overnight. Review of Systems:  Symptom Y/N Comments  Symptom Y/N Comments   Fever/Chills n   Chest Pain n    Poor Appetite    Edema     Cough    Abdominal Pain n    Sputum    Joint Pain     SOB/BECK n   Pruritis/Rash     Nausea/vomit n   Tolerating PT/OT y    Diarrhea n   Tolerating Diet     Constipation n   Other       Could NOT obtain due to:      Objective:     VITALS:   Last 24hrs VS reviewed since prior progress note. Most recent are:  Patient Vitals for the past 24 hrs:  01/10/18 1511 98.7 °F (37.1 °C) 63 17 125/86 96 %            PHYSICAL EXAM:  General: WD, WN. Alert, cooperative, no acute distress    CV:  Regular  rhythm,  R LE swelling/erythema - resolved; Left foot swelling better     GI:  Soft, Non distended, Non tender.  +Bowel sounds  Neurologic:  Alert and oriented X 3, normal speech  Psych:   Good insight. Not anxious nor agitated  Skin:  No jaundice.  I viewed the wound, could not take a picture as mikekignacio was not working for me    Superficial ulcer healing, no cellulitis or purulence 12/22 12/26 1/5/2017              Reviewed most current lab test results and cultures  YES  Reviewed most current radiology test results   YES  Review and summation of old records today    NO  Reviewed patient's current orders and MAR    YES  PMH/SH reviewed - no change compared to H&P  ________________________________________________________________________  Care Plan discussed with:    Comments   Patient y    Family      RN y    Care Manager     Consultant                        Multidiciplinary team rounds were held today with , nursing, pharmacist and clinical coordinator. Patient's plan of care was discussed; medications were reviewed and discharge planning was addressed. _______________________________________________________________________  Total NON critical care TIME:  15 Minutes    Total CRITICAL CARE TIME Spent:   Minutes non procedure based      Comments   >50% of visit spent in counseling and coordination of care     ________________________________________________________________________  Risa Flores MD     Procedures: see electronic medical records for all procedures/Xrays and details which were not copied into this note but were reviewed prior to creation of Plan. LABS:  I reviewed today's most current labs and imaging studies.   Pertinent labs include:  Recent Labs      01/09/18   0653   WBC  8.4   HGB  11.4*   HCT  35.3*   PLT  308     Recent Labs       Signed: Risa Flores MD

## 2018-01-12 PROCEDURE — 97530 THERAPEUTIC ACTIVITIES: CPT

## 2018-01-12 PROCEDURE — 65270000029 HC RM PRIVATE

## 2018-01-12 PROCEDURE — 74011250637 HC RX REV CODE- 250/637: Performed by: HOSPITALIST

## 2018-01-12 PROCEDURE — 74011250637 HC RX REV CODE- 250/637: Performed by: INTERNAL MEDICINE

## 2018-01-12 RX ORDER — COLCHICINE 0.6 MG/1
0.6 TABLET ORAL
Qty: 30 TAB | Refills: 0 | Status: ON HOLD | OUTPATIENT
Start: 2018-01-12 | End: 2018-10-12

## 2018-01-12 RX ORDER — ALLOPURINOL 100 MG/1
100 TABLET ORAL DAILY
Qty: 30 TAB | Refills: 0 | Status: ON HOLD | OUTPATIENT
Start: 2018-01-12 | End: 2019-05-24 | Stop reason: SDUPTHER

## 2018-01-12 RX ORDER — LEVOTHYROXINE SODIUM 75 UG/1
75 TABLET ORAL
Qty: 30 TAB | Refills: 0 | Status: SHIPPED | OUTPATIENT
Start: 2018-01-12 | End: 2018-09-28 | Stop reason: DRUGHIGH

## 2018-01-12 RX ORDER — POLYETHYLENE GLYCOL 3350 17 G/17G
17 POWDER, FOR SOLUTION ORAL DAILY
Qty: 30 EACH | Refills: 2 | Status: SHIPPED
Start: 2018-01-12 | End: 2018-03-13

## 2018-01-12 RX ORDER — LISINOPRIL 20 MG/1
20 TABLET ORAL DAILY
Qty: 30 TAB | Refills: 0 | Status: ON HOLD | OUTPATIENT
Start: 2018-01-12 | End: 2018-03-26

## 2018-01-12 RX ADMIN — Medication 1 CAPSULE: at 09:05

## 2018-01-12 RX ADMIN — LISINOPRIL 40 MG: 20 TABLET ORAL at 09:05

## 2018-01-12 RX ADMIN — Medication 5 ML: at 05:32

## 2018-01-12 RX ADMIN — POLYETHYLENE GLYCOL 3350 17 G: 17 POWDER, FOR SOLUTION ORAL at 09:05

## 2018-01-12 RX ADMIN — HYDROMORPHONE HYDROCHLORIDE 2 MG: 2 TABLET ORAL at 04:11

## 2018-01-12 RX ADMIN — IBUPROFEN 400 MG: 400 TABLET, FILM COATED ORAL at 11:41

## 2018-01-12 RX ADMIN — LEVOTHYROXINE SODIUM 75 MCG: 75 TABLET ORAL at 09:05

## 2018-01-12 RX ADMIN — ASPIRIN 81 MG 81 MG: 81 TABLET ORAL at 09:04

## 2018-01-12 RX ADMIN — FAMOTIDINE 20 MG: 20 TABLET ORAL at 09:05

## 2018-01-12 RX ADMIN — DOCUSATE SODIUM 100 MG: 100 CAPSULE, LIQUID FILLED ORAL at 09:05

## 2018-01-12 RX ADMIN — IBUPROFEN 400 MG: 400 TABLET, FILM COATED ORAL at 04:11

## 2018-01-12 RX ADMIN — COLCHICINE 0.6 MG: 0.6 TABLET, FILM COATED ORAL at 09:18

## 2018-01-12 RX ADMIN — HYDROMORPHONE HYDROCHLORIDE 2 MG: 2 TABLET ORAL at 11:30

## 2018-01-12 RX ADMIN — METHADONE HYDROCHLORIDE 80 MG: 10 TABLET ORAL at 09:05

## 2018-01-12 NOTE — PROGRESS NOTES
Patient refused to have blood drawn for lab work. Patient states he is being discharged today and he is not giving any more blood.

## 2018-01-12 NOTE — PROGRESS NOTES
PCP f/u is scheduled with Dr Jose Nation for [place] 15 at 10;00am    A f/u with Dr Cali Bryant is scheduled for Jan 26 at 9;00am

## 2018-01-12 NOTE — INTERDISCIPLINARY ROUNDS
Dressing changed per order. Patient insistent on having a different type of dressing and states that the dressing is what has caused him to return with infections in the past.  Educated that wound care would be done per orders only. Agreeable after much discussion. Coban placed (loosely) over gauze wrap per patient request \"manuelito of the rain\". Patient was on the phone with the \"insurance company\" on/off while dressing was being changed. Visually tolerated dressing change very well despite rating pain 10/10 when asked. States his ride will come \"when she gets off work\".

## 2018-01-12 NOTE — PROGRESS NOTES
Problem: Mobility Impaired (Adult and Pediatric)  Goal: *Acute Goals and Plan of Care (Insert Text)  Physical Therapy Goals  Goals reviewed and revised 1/4/2018  1. Patient will move from supine to sit and sit to supine , scoot up and down and roll side to side in bed with independence within 7 day(s). 2.  Patient will transfer from bed to chair and chair to bed with modified independence using the least restrictive device within 7 day(s). 3.  Patient will perform sit to stand with minimal assistance/contact guard assist within 7 day(s). 4.  Patient will ambulate with minimal assistance/contact guard assist for 75 feet with the least restrictive device within 7 day(s). Initiated 12/28/2017  1. Patient will move from supine to sit and sit to supine  and roll side to side in bed with independence within 7 day(s). 2.  Patient will transfer from bed to chair and chair to bed with modified independence using the least restrictive device within 7 day(s). 3.  Patient will perform sit to stand with modified independence within 7 day(s). 4.  Patient will ambulate with supervision/set-up for 50 feet with the least restrictive device within 7 day(s). physical Therapy TREATMENT  Patient: Rachel Alan (74 y.o. male)  Date: 1/12/2018  Diagnosis: Bilateral cellulitis of lower leg  HTN (hypertension)  necrotic tissue left foot Bilateral cellulitis of lower leg  Procedure(s) (LRB):  INCISION AND DRAINAGE LEFT FOOT (Left) 14 Days Post-Op  Precautions: WBAT (WITH POST OP SHOE LLE)    ASSESSMENT:  Pt Arelis Louie engages in education regarding mobility techniques for stair negotiation, energy conservation techniques, and pain management techniques in light of pending discharge to private residence this afternoon. Coordinated with care manager to discuss wheelchair acquisition and possible need for medical transport home depending on home environment (which pt states is currently unknown).  Pt declining to participate in gait and stair training with PT stating he does not want to experience the associated pain until he absolutely must.     Progression toward goals:  []    Improving appropriately and progressing toward goals  [x]    Improving slowly and progressing toward goals  []    Not making progress toward goals and plan of care will be adjusted     PLAN:  Patient continues to benefit from skilled intervention to address the above impairments. Continue treatment per established plan of care. Discharge Recommendations:  Home Health  Further Equipment Recommendations for Discharge:  Hemiparetic wheelchair with R-sided controls. SUBJECTIVE:   Patient stated I've been walking in and out of the bathroom when I have to but it hurts really bad.     OBJECTIVE DATA SUMMARY:   Critical Behavior:  Neurologic State: Alert  Orientation Level: Oriented X4  Cognition: Follows commands  Safety/Judgement: Awareness of environment, Decreased awareness of need for assistance, Fall prevention, Home safety  Functional Mobility Training:        Pt declines. Stairs:      Discussed techniques for negotiating stairs while pt seated in wheelchair with 2-person assist.   Pt relates to PT that wheelchair is to be delivered to hospital prior to discharge. Per care manager pt declining wheelchair acquisition earlier today but care manager now working to acquire wheelchair from vendor for delivery to pt's home given discharge this afternoon. Discussed techniques for stair negotiation, transport to very bottom of stairs as able, and placement of chair at top of stairs for rest. Advised assistance of family for stair negotiation. He indicates understanding, repeatedly declines practice/performance with PT.          Pain:  Pain Scale 1: Numeric (0 - 10)  Pain Intensity 1: 8  Pain Location 1: Foot  Pain Orientation 1: Left  Pain Description 1: Aching  Pain Intervention(s) 1: Medication (see MAR)  Activity Tolerance:   Limited by pain reportedly. Please refer to the flowsheet for vital signs taken during this treatment.   After treatment:   []    Patient left in no apparent distress sitting up in chair  [x]    Patient left in no apparent distress in bed  [x]    Call bell left within reach  [x]    Nursing notified  []    Caregiver present  []    Bed alarm activated    COMMUNICATION/COLLABORATION:   The patients plan of care was discussed with: Registered Nurse and care manager    Yesy Conn, PT, DPT   Time Calculation: 19 mins

## 2018-01-12 NOTE — PROGRESS NOTES
Pt to discharge home today. He will need HH at discharge as SNF will not accept due to the methadone medication and inpatient rehab did not accept. SW ordered a wheelchair for pt but pt was reluctant to get the wheelchair as he stated he did not feel the wheelchair would not fit in his home. Pt told PT he did want the wheelchair. Pt would have conflicting conversations with various hospital staff which made it difficult to complete discharge planning. Pt CM with insurance contacted SW and was briefed on discharge plans. Pt stated he has a caregiver to assist him at discharge. Wheelchair was ordered though [employer] and they will deliver to pt home once medicaid documents have been approved. Contact info on pt AVS for him to follow up with delivery of wheelchair. Pt stated he was unsure if family would be able to see transport today. SW arranged transportation with Medicaid, PCS on chart. [facility] contacted and they are scheduled to visit with him tomorrow. All info entered on pt AVS.    Care Management Interventions  PCP Verified by CM:  Yes  Mode of Transport at Discharge: [address] (CM Consult): [address]: No  Reason Outside [place]: Patient already serviced by other home care/hospice agency  Discharge Durable Medical Equipment: No  Physical Therapy Consult: Yes  Occupational Therapy Consult: Yes  Speech Therapy Consult: No  Current Support Network: Own Home  Confirm Follow Up Transport: Self (Pt drives but has supportive family to assist if needed)  Plan discussed with Pt/Family/Caregiver: Yes  Discharge Location  Discharge Placement: Home with home health    JUVENCIO Schuster  Ext [phone]

## 2018-01-12 NOTE — PROGRESS NOTES
Home Health Care Discharge Planning: [facility]  Face to Face Encounter      NAME: Shannan Thakur   :  1965   MRN:  [id number]     Primary Diagnosis: LE cellulitis, chronic pain, chronic left sided weakness    Date of Face to Face:  2018 9:26 AM                                  Face to Face Encounter findings are related to primary reason for home care:   YES    1. I certify that the patient needs intermittent skilled nursing care, physical therapy and/or speech therapy. I will not be following this patient in the Community and Dr. Luis Alfredo Young MD will be responsible for signing the [address] of Care. 2. Initial Orders for Care: Skilled Nursing, Physical Therapy and Occupational Therapy    3. I certify that this patient is homebound because of illness or injury, need the aid of supportive devices such as crutches, canes, wheelchairs, and walkers; the use of special transportation; or the assistance of another person in order to leave their place of residence. There exists a normal inability to leave home and leaving home requires a considerable and taxing effort. 4. I certify that this patient is under my care and that I had a Face-to-Face Encounter that meets the physician Face-to-Face Encounter requirements. Document the physical findings from the Face-to-Face Encounter that support the need for skilled services: Has wound that requires skilled nursing assessment and treatment  and Has new finding of weakness and altered mobility that requires skilled physical/occupational therapy services for evaluation and interventions.      Maia Moser MD  Discharging Physician  Office: [phone]  Fax:   [phone]

## 2018-01-12 NOTE — DISCHARGE SUMMARY
Hospitalist Discharge Summary     Patient ID:  Claire Ansari  [id number]  41 y.o.  1965    PCP on record: Calin Hatch MD    Admit date: 12/21/2017  Discharge date and time: 1/12/2018      DISCHARGE DIAGNOSIS:  LE cellulitis POA with purulent drainage  Chronic non healing left foot ulcer/wound with cellulitis   Essential HTN POA/ sinus bradycardia   Bladder mass   Left sided weakness  from Old CVA POA  Chronic Pain syndrome on maintenance Methadone at [facility] 65 mg daily  Gout arthritis    CONSULTATIONS:  IP CONSULT TO PODIATRY  IP CONSULT TO INFECTIOUS DISEASES  IP CONSULT TO PALLIATIVE CARE - PROVIDER  IP CONSULT TO VASCULAR SURGERY    Excerpted HPI from H&P of Mirian Jimenes MD:  Honorio Chavez is a 47 y.o.  male who presents with above complains from home ambulatory. Pt came in with CC of worsening pain in the L foot x 2 days  H/o associated redness swelling L leg >R leg with low grade fever at home as per pt  H/o doing his own wound care for the L foot -- use to have [facility] for wound care & sees Dr Alfreda Arroyo (podiarty) too. No h/o diabetes  H/o Chronic pain from L sided hemiparesis from old CVA - on methadone as per pt.     Pt was found to have significant pain with swelling & redness of the L leg , some on R leg in ER with unremarkable blood work in ER     We were asked to admit for work up and evaluation of the above problems. \"   ______________________________________________________________________  DISCHARGE SUMMARY/HOSPITAL COURSE:  for full details see H&P, daily progress notes, labs, consult notes. My interaction with patient on day of discharge was suboptimal. I was attempting to explain to patient about pain control being more difficult in patient's on methadone and used and similar comparison of patient's who have use heroin too. Patient interpreted this as me accusing him of using heroin.  I informed him that this was not the case and politely informed him that I would be discharging him and left his room. Per review of EMR and discussion with Dr. Enrique Smith, every effort has been made to get patient to a more ideal discharge setting but due to the issues outlined below he will be discharged with home health. Hospitalization per problem below:  LE cellulitis POA with purulent drainage on admit  Chronic non healing left foot ulcer/wound with cellulitis POA  Completed 14 days IV zosyn on 1/4/2017, now off antibiotics  BC negative  wound cultures: pseudomonas plus Enterococcus and Grp B strep in thio broth  MRI left foot 12/28:No fluid collection/ osteomyelitis  Vascular surgery and podiatry help appreciated, some concern about vascular insufficiency initially  Pt seen twice by Dr Namrata Arredondo 12/23 and as second opinion Dr Jacob Santillan 12/26  [place] to have adequate left foot arterial profusion to undergo debridement of tissue  OR for debridement 12/29/2017        Cultures with rare diptheroids and pseudomonas from thio broth  Wound care post op, foot looks much improved  Pain management: appreciated palliative care team assistance, per their rec's  1. Pain level should be based on observation, not patient rating.    2. Increase the Methadone to 80mg/day. 3. Increase the Dilaudid to 8mg PO q. 4 hours prn.  4. Discontinue the IV Dilaudid. 5. When pt is ready for discharge, do not provide any opioid prescriptions.  The methadone will prevent withdrawal symptoms when the dilaudid is stopped.   Pt will need to follow up with methadone clinic for further pain med advice-->patient is aware of this as they will manage his pain meds  Did receive Dilaudid and methadone while inpatient  LE dopplers negative for DVT  Medically ready for discharge  No SNF will take him between insurance and the methadone use per CM  Denied by acute rehab  Discharge to home with home health only option     Essential HTN POA/ sinus bradycardia   -BP controlled, continue lisinopril     Bladder mass   Per pt he was diagnosed with a bladder mass at [facility] per pt  Unable to get records  follow with urology OP as planned previously (per pt discussion with Dr. Alison Bowman he has appointment)      Left  Sided weakness from Old CVA POA     Chronic Pain syndrome on maintenance Methadone at [facility] 65 mg daily  Palliative care spoke with clinic, agreed on PO dilaudid(now weaning off) and increased methadone post op (was on 80 mg)  Weaning off the dilaudid by AM 1/12/2017  Outpatient methadone per clinic at discharge, no narcotic scripts at discharge     Gout arthritis, cont allopurinol      Baseline: lives alone, ambulating with cane    _______________________________________________________________________  Patient seen and examined by me on discharge day. Pertinent Findings:  Gen:    Not in distress  Chest: No accessory muscle use  CVS:   Did not examine  Abd:  ND  Neuro:  Alert  _______________________________________________________________________  DISCHARGE MEDICATIONS:   Current Discharge Medication List      START taking these medications    Details   polyethylene glycol (MIRALAX) 17 gram packet Take 1 Packet by mouth daily. This is available over the counter  Qty: 30 Each, Refills: 2         CONTINUE these medications which have CHANGED    Details   colchicine (COLCRYS) 0.6 mg tablet Take 1 Tab by mouth daily as needed. For gout flare  Qty: 30 Tab, Refills: 0      allopurinol (ZYLOPRIM) 100 mg tablet Take 1 Tab by mouth daily. Qty: 30 Tab, Refills: 0      levothyroxine (SYNTHROID) 75 mcg tablet Take 1 Tab by mouth Daily (before breakfast). Qty: 30 Tab, Refills: 0      lisinopril (PRINIVIL, ZESTRIL) 20 mg tablet Take 1 Tab by mouth daily. Qty: 30 Tab, Refills: 0         CONTINUE these medications which have NOT CHANGED    Details   METHADONE HCL (METHADONE, BULK,) Take 65 mg by mouth daily.  Per [facility] [phone] Dr. Anne Rodriges  Verified 12-22-17      aspirin 81 mg chewable tablet Take 1 Tab by mouth daily. Qty: 30 Tab, Refills: 0             My Recommended Diet, Activity, Wound Care, and follow-up labs are listed in the patient's Discharge Insturctions which I have personally completed and reviewed. ______________________________________________________________________    Risk of deterioration: Low    Condition at Discharge:  Stable  ______________________________________________________________________    Disposition  Home with family and home health services  ______________________________________________________________________    Care Plan discussed with:   Patient, RN, Care Manager  Also discussed with Dr. Radha More this am who had been caring for patient for the majority of his hospital stay  ______________________________________________________________________    Code Status: Full Code  ______________________________________________________________________      Follow up with: Methadone clinic immediately post-discharge. Also follow-up with you Urology appointment as scheduled.     PCP : Char Najera MD  Follow-up Information     Follow up With Details Comments [id number] Tam Victoria MD Schedule an appointment as soon as possible for a visit  [address]       [facility]  [phone]    Meena Briggs DPM Schedule an appointment as soon as possible for a visit in 2 weeks  [address] [place] [address] 7 [address] [facility]      Ameya Lucia MD On 1/25/2018 Appointment at 10:00 a.m. [address] [place] [address]  [phone]  [phone]                Total time in minutes spent coordinating this discharge (includes going over instructions, follow-up, prescriptions, and preparing report for sign off to her PCP) :  29 minutes    Signed:  Yudelka Hernandez MD

## 2018-01-12 NOTE — ROUTINE PROCESS
Bedside and Verbal shift change report given to Lulu Santoro RN (oncoming nurse) by Pippa Ridley RN (offgoing nurse). Report included the following information SBAR, Kardex, Intake/Output, MAR, Recent Results and Med Rec Status.

## 2018-01-12 NOTE — PROGRESS NOTES
Bedside and Verbal shift change report given to Joao Chen RN (oncoming nurse) by Yuni Hanley RN (offgoing nurse). Report included the following information SBAR, Kardex, MAR and Recent Results. Patient verbalizes plan is to discharge to home today. When asked, patient states his ride is coming \"when she gets off work\". CM to possibly set up Medicaid transport for patient.

## 2018-01-13 VITALS
WEIGHT: 208 LBS | TEMPERATURE: 97.5 F | DIASTOLIC BLOOD PRESSURE: 70 MMHG | OXYGEN SATURATION: 96 % | HEART RATE: 53 BPM | BODY MASS INDEX: 27.57 KG/M2 | SYSTOLIC BLOOD PRESSURE: 108 MMHG | RESPIRATION RATE: 18 BRPM | HEIGHT: 73 IN

## 2018-01-13 PROCEDURE — 74011250637 HC RX REV CODE- 250/637: Performed by: INTERNAL MEDICINE

## 2018-01-13 PROCEDURE — 74011250637 HC RX REV CODE- 250/637: Performed by: HOSPITALIST

## 2018-01-13 RX ADMIN — Medication 1 CAPSULE: at 09:12

## 2018-01-13 RX ADMIN — FAMOTIDINE 20 MG: 20 TABLET ORAL at 09:12

## 2018-01-13 RX ADMIN — Medication 10 ML: at 13:47

## 2018-01-13 RX ADMIN — DOCUSATE SODIUM 100 MG: 100 CAPSULE, LIQUID FILLED ORAL at 09:12

## 2018-01-13 RX ADMIN — ALLOPURINOL 100 MG: 100 TABLET ORAL at 09:12

## 2018-01-13 RX ADMIN — METHADONE HYDROCHLORIDE 80 MG: 10 TABLET ORAL at 09:12

## 2018-01-13 RX ADMIN — POLYETHYLENE GLYCOL 3350 17 G: 17 POWDER, FOR SOLUTION ORAL at 09:11

## 2018-01-13 RX ADMIN — ASPIRIN 81 MG 81 MG: 81 TABLET ORAL at 09:12

## 2018-01-13 RX ADMIN — ACETAMINOPHEN 650 MG: 325 TABLET ORAL at 16:44

## 2018-01-13 RX ADMIN — Medication 10 ML: at 05:23

## 2018-01-13 RX ADMIN — LISINOPRIL 40 MG: 20 TABLET ORAL at 09:12

## 2018-01-13 RX ADMIN — COLCHICINE 0.6 MG: 0.6 TABLET, FILM COATED ORAL at 09:11

## 2018-01-13 RX ADMIN — IBUPROFEN 400 MG: 400 TABLET, FILM COATED ORAL at 13:46

## 2018-01-13 RX ADMIN — ACETAMINOPHEN 650 MG: 325 TABLET ORAL at 05:23

## 2018-01-13 RX ADMIN — LEVOTHYROXINE SODIUM 75 MCG: 75 TABLET ORAL at 09:12

## 2018-01-13 NOTE — PROGRESS NOTES
Met with pt and discussed discharge plan of home health and transport. Pt will be receiving home health from [facility] home health, PT, OT and Nursing services. Pt will be staying at his friend's home, Lisa Interiano at [address] [facility] [place] [address] Her phone number is [phone]. CM called pt's daughter Brett Thorne ([phone]) and left message regarding driving pt home. Pt called Eduardo Salgado and she is checking if she can get to the hospital and if not medical transport will be set up. Pt requested that his discharge summary get faxed to [facility] where he goes for pain management. This CM will fax the discharge summary to them - fax#[phone]. CM sent discharge orders via allscripts to [facility]. CM informed Dr. Yvette Acosta of the discharge plan. 2pm - received update from pt's nurse that pt has a ride home. Medical transport set up for 4pm in case the ride doesn't  pt.      Naina Wills, [phone]

## 2018-03-06 PROBLEM — Z80.52 FH: BLADDER CANCER: Status: ACTIVE | Noted: 2018-03-06

## 2018-03-06 PROBLEM — D49.4 BLADDER TUMOR: Status: ACTIVE | Noted: 2018-03-06

## 2018-03-13 ENCOUNTER — APPOINTMENT (OUTPATIENT)
Dept: ULTRASOUND IMAGING | Age: 53
DRG: 380 | End: 2018-03-13
Attending: EMERGENCY MEDICINE
Payer: MEDICAID

## 2018-03-13 ENCOUNTER — APPOINTMENT (OUTPATIENT)
Dept: GENERAL RADIOLOGY | Age: 53
DRG: 380 | End: 2018-03-13
Attending: PHYSICIAN ASSISTANT
Payer: MEDICAID

## 2018-03-13 ENCOUNTER — HOSPITAL ENCOUNTER (INPATIENT)
Age: 53
LOS: 13 days | Discharge: HOME HEALTH | DRG: 380 | End: 2018-03-26
Attending: EMERGENCY MEDICINE | Admitting: EMERGENCY MEDICINE
Payer: MEDICAID

## 2018-03-13 DIAGNOSIS — I10 ACCELERATED HYPERTENSION: ICD-10-CM

## 2018-03-13 DIAGNOSIS — L97.523 SKIN ULCER OF LEFT FOOT WITH NECROSIS OF MUSCLE (HCC): ICD-10-CM

## 2018-03-13 DIAGNOSIS — M86.9 OSTEOMYELITIS OF LEFT FOOT, UNSPECIFIED TYPE (HCC): Primary | ICD-10-CM

## 2018-03-13 DIAGNOSIS — L03.116 LEFT LEG CELLULITIS: ICD-10-CM

## 2018-03-13 PROBLEM — L08.9 LEFT FOOT INFECTION: Status: ACTIVE | Noted: 2018-03-13

## 2018-03-13 LAB
ANION GAP SERPL CALC-SCNC: 8 MMOL/L (ref 5–15)
BASOPHILS # BLD: 0.1 K/UL (ref 0–0.1)
BASOPHILS NFR BLD: 0 % (ref 0–1)
BUN SERPL-MCNC: 15 MG/DL (ref 6–20)
BUN/CREAT SERPL: 21 (ref 12–20)
CALCIUM SERPL-MCNC: 8.9 MG/DL (ref 8.5–10.1)
CHLORIDE SERPL-SCNC: 105 MMOL/L (ref 97–108)
CO2 SERPL-SCNC: 28 MMOL/L (ref 21–32)
CREAT SERPL-MCNC: 0.72 MG/DL (ref 0.7–1.3)
CRP SERPL-MCNC: 0.76 MG/DL (ref 0–0.6)
DIFFERENTIAL METHOD BLD: ABNORMAL
EOSINOPHIL # BLD: 0.3 K/UL (ref 0–0.4)
EOSINOPHIL NFR BLD: 3 % (ref 0–7)
ERYTHROCYTE [DISTWIDTH] IN BLOOD BY AUTOMATED COUNT: 17.1 % (ref 11.5–14.5)
ERYTHROCYTE [SEDIMENTATION RATE] IN BLOOD: 6 MM/HR (ref 0–20)
GLUCOSE SERPL-MCNC: 109 MG/DL (ref 65–100)
HCT VFR BLD AUTO: 39.6 % (ref 36.6–50.3)
HGB BLD-MCNC: 13 G/DL (ref 12.1–17)
IMM GRANULOCYTES # BLD: 0.1 K/UL (ref 0–0.04)
IMM GRANULOCYTES NFR BLD AUTO: 1 % (ref 0–0.5)
LACTATE SERPL-SCNC: 0.8 MMOL/L (ref 0.4–2)
LYMPHOCYTES # BLD: 2.5 K/UL (ref 0.8–3.5)
LYMPHOCYTES NFR BLD: 21 % (ref 12–49)
MCH RBC QN AUTO: 29 PG (ref 26–34)
MCHC RBC AUTO-ENTMCNC: 32.8 G/DL (ref 30–36.5)
MCV RBC AUTO: 88.4 FL (ref 80–99)
MONOCYTES # BLD: 0.8 K/UL (ref 0–1)
MONOCYTES NFR BLD: 7 % (ref 5–13)
NEUTS SEG # BLD: 8 K/UL (ref 1.8–8)
NEUTS SEG NFR BLD: 68 % (ref 32–75)
NRBC # BLD: 0 K/UL (ref 0–0.01)
NRBC BLD-RTO: 0 PER 100 WBC
PLATELET # BLD AUTO: 238 K/UL (ref 150–400)
PMV BLD AUTO: 10.7 FL (ref 8.9–12.9)
POTASSIUM SERPL-SCNC: 4.9 MMOL/L (ref 3.5–5.1)
RBC # BLD AUTO: 4.48 M/UL (ref 4.1–5.7)
SODIUM SERPL-SCNC: 141 MMOL/L (ref 136–145)
WBC # BLD AUTO: 11.8 K/UL (ref 4.1–11.1)

## 2018-03-13 PROCEDURE — 86140 C-REACTIVE PROTEIN: CPT | Performed by: EMERGENCY MEDICINE

## 2018-03-13 PROCEDURE — 99284 EMERGENCY DEPT VISIT MOD MDM: CPT

## 2018-03-13 PROCEDURE — 74011250637 HC RX REV CODE- 250/637: Performed by: EMERGENCY MEDICINE

## 2018-03-13 PROCEDURE — 85652 RBC SED RATE AUTOMATED: CPT | Performed by: EMERGENCY MEDICINE

## 2018-03-13 PROCEDURE — 80048 BASIC METABOLIC PNL TOTAL CA: CPT | Performed by: PHYSICIAN ASSISTANT

## 2018-03-13 PROCEDURE — 93922 UPR/L XTREMITY ART 2 LEVELS: CPT

## 2018-03-13 PROCEDURE — 85025 COMPLETE CBC W/AUTO DIFF WBC: CPT | Performed by: PHYSICIAN ASSISTANT

## 2018-03-13 PROCEDURE — 65270000029 HC RM PRIVATE

## 2018-03-13 PROCEDURE — 74011250636 HC RX REV CODE- 250/636: Performed by: EMERGENCY MEDICINE

## 2018-03-13 PROCEDURE — 74011000250 HC RX REV CODE- 250: Performed by: EMERGENCY MEDICINE

## 2018-03-13 PROCEDURE — 93971 EXTREMITY STUDY: CPT

## 2018-03-13 PROCEDURE — 36415 COLL VENOUS BLD VENIPUNCTURE: CPT | Performed by: PHYSICIAN ASSISTANT

## 2018-03-13 PROCEDURE — 96367 TX/PROPH/DG ADDL SEQ IV INF: CPT

## 2018-03-13 PROCEDURE — 96365 THER/PROPH/DIAG IV INF INIT: CPT

## 2018-03-13 PROCEDURE — 87040 BLOOD CULTURE FOR BACTERIA: CPT | Performed by: EMERGENCY MEDICINE

## 2018-03-13 PROCEDURE — 83605 ASSAY OF LACTIC ACID: CPT | Performed by: PHYSICIAN ASSISTANT

## 2018-03-13 PROCEDURE — 73630 X-RAY EXAM OF FOOT: CPT

## 2018-03-13 PROCEDURE — 96375 TX/PRO/DX INJ NEW DRUG ADDON: CPT

## 2018-03-13 RX ORDER — ALLOPURINOL 100 MG/1
100 TABLET ORAL DAILY
Status: DISCONTINUED | OUTPATIENT
Start: 2018-03-14 | End: 2018-03-26 | Stop reason: HOSPADM

## 2018-03-13 RX ORDER — SODIUM CHLORIDE 0.9 % (FLUSH) 0.9 %
5-10 SYRINGE (ML) INJECTION EVERY 8 HOURS
Status: DISCONTINUED | OUTPATIENT
Start: 2018-03-13 | End: 2018-03-26 | Stop reason: HOSPADM

## 2018-03-13 RX ORDER — NALOXONE HYDROCHLORIDE 0.4 MG/ML
0.4 INJECTION, SOLUTION INTRAMUSCULAR; INTRAVENOUS; SUBCUTANEOUS AS NEEDED
Status: DISCONTINUED | OUTPATIENT
Start: 2018-03-13 | End: 2018-03-26 | Stop reason: HOSPADM

## 2018-03-13 RX ORDER — VANCOMYCIN HYDROCHLORIDE
1250 EVERY 8 HOURS
Status: DISCONTINUED | OUTPATIENT
Start: 2018-03-14 | End: 2018-03-15

## 2018-03-13 RX ORDER — ASPIRIN 81 MG/1
81 TABLET ORAL DAILY
COMMUNITY
End: 2018-09-28

## 2018-03-13 RX ORDER — IBUPROFEN 200 MG
1200 TABLET ORAL
COMMUNITY
End: 2018-03-26

## 2018-03-13 RX ORDER — PREGABALIN 150 MG/1
150 CAPSULE ORAL 2 TIMES DAILY
Status: DISCONTINUED | OUTPATIENT
Start: 2018-03-14 | End: 2018-03-26 | Stop reason: HOSPADM

## 2018-03-13 RX ORDER — IBUPROFEN 200 MG
1 TABLET ORAL EVERY 24 HOURS
Status: DISCONTINUED | OUTPATIENT
Start: 2018-03-13 | End: 2018-03-26 | Stop reason: HOSPADM

## 2018-03-13 RX ORDER — LIDOCAINE AND PRILOCAINE 25; 25 MG/G; MG/G
CREAM TOPICAL
Status: ON HOLD | COMMUNITY
End: 2018-06-06

## 2018-03-13 RX ORDER — LIDOCAINE HYDROCHLORIDE 20 MG/ML
JELLY TOPICAL
Status: COMPLETED | OUTPATIENT
Start: 2018-03-13 | End: 2018-03-13

## 2018-03-13 RX ORDER — LEVOTHYROXINE SODIUM 25 UG/1
75 TABLET ORAL
Status: DISCONTINUED | OUTPATIENT
Start: 2018-03-14 | End: 2018-03-26 | Stop reason: SDUPTHER

## 2018-03-13 RX ORDER — LISINOPRIL 20 MG/1
20 TABLET ORAL DAILY
Status: DISCONTINUED | OUTPATIENT
Start: 2018-03-14 | End: 2018-03-24

## 2018-03-13 RX ORDER — SODIUM CHLORIDE 0.9 % (FLUSH) 0.9 %
5-10 SYRINGE (ML) INJECTION AS NEEDED
Status: DISCONTINUED | OUTPATIENT
Start: 2018-03-13 | End: 2018-03-26 | Stop reason: HOSPADM

## 2018-03-13 RX ORDER — ACETAMINOPHEN 325 MG/1
650 TABLET ORAL
Status: DISCONTINUED | OUTPATIENT
Start: 2018-03-13 | End: 2018-03-26 | Stop reason: HOSPADM

## 2018-03-13 RX ORDER — ASPIRIN 81 MG/1
81 TABLET ORAL DAILY
Status: DISCONTINUED | OUTPATIENT
Start: 2018-03-14 | End: 2018-03-18

## 2018-03-13 RX ORDER — HYDROMORPHONE HYDROCHLORIDE 2 MG/ML
0.5 INJECTION, SOLUTION INTRAMUSCULAR; INTRAVENOUS; SUBCUTANEOUS
Status: DISCONTINUED | OUTPATIENT
Start: 2018-03-13 | End: 2018-03-14

## 2018-03-13 RX ORDER — DIPHENHYDRAMINE HCL 25 MG
25 CAPSULE ORAL
Status: DISCONTINUED | OUTPATIENT
Start: 2018-03-13 | End: 2018-03-26 | Stop reason: HOSPADM

## 2018-03-13 RX ORDER — PREGABALIN 75 MG/1
150 CAPSULE ORAL 2 TIMES DAILY
Status: ON HOLD | COMMUNITY
End: 2018-06-06

## 2018-03-13 RX ORDER — ONDANSETRON 2 MG/ML
4 INJECTION INTRAMUSCULAR; INTRAVENOUS
Status: DISCONTINUED | OUTPATIENT
Start: 2018-03-13 | End: 2018-03-26 | Stop reason: HOSPADM

## 2018-03-13 RX ORDER — HEPARIN SODIUM 5000 [USP'U]/ML
5000 INJECTION, SOLUTION INTRAVENOUS; SUBCUTANEOUS EVERY 8 HOURS
Status: DISCONTINUED | OUTPATIENT
Start: 2018-03-13 | End: 2018-03-18

## 2018-03-13 RX ORDER — IPRATROPIUM BROMIDE AND ALBUTEROL SULFATE 2.5; .5 MG/3ML; MG/3ML
3 SOLUTION RESPIRATORY (INHALATION)
Status: DISCONTINUED | OUTPATIENT
Start: 2018-03-14 | End: 2018-03-14

## 2018-03-13 RX ORDER — MORPHINE SULFATE 2 MG/ML
4 INJECTION, SOLUTION INTRAMUSCULAR; INTRAVENOUS
Status: COMPLETED | OUTPATIENT
Start: 2018-03-13 | End: 2018-03-13

## 2018-03-13 RX ORDER — HYDROMORPHONE HYDROCHLORIDE 2 MG/1
2 TABLET ORAL
Status: DISCONTINUED | OUTPATIENT
Start: 2018-03-13 | End: 2018-03-19

## 2018-03-13 RX ORDER — FENTANYL CITRATE 50 UG/ML
50 INJECTION, SOLUTION INTRAMUSCULAR; INTRAVENOUS
Status: COMPLETED | OUTPATIENT
Start: 2018-03-13 | End: 2018-03-13

## 2018-03-13 RX ADMIN — FENTANYL CITRATE 50 MCG: 50 INJECTION, SOLUTION INTRAMUSCULAR; INTRAVENOUS at 18:25

## 2018-03-13 RX ADMIN — PIPERACILLIN SODIUM AND TAZOBACTAM SODIUM 3.38 G: .375; 3 INJECTION, POWDER, LYOPHILIZED, FOR SOLUTION INTRAVENOUS at 19:37

## 2018-03-13 RX ADMIN — VANCOMYCIN HYDROCHLORIDE 2500 MG: 10 INJECTION, POWDER, LYOPHILIZED, FOR SOLUTION INTRAVENOUS at 20:35

## 2018-03-13 RX ADMIN — MORPHINE SULFATE 4 MG: 2 INJECTION, SOLUTION INTRAMUSCULAR; INTRAVENOUS at 20:32

## 2018-03-13 RX ADMIN — LIDOCAINE HYDROCHLORIDE: 20 JELLY TOPICAL at 19:42

## 2018-03-13 RX ADMIN — HEPARIN SODIUM 5000 UNITS: 5000 INJECTION, SOLUTION INTRAVENOUS; SUBCUTANEOUS at 23:43

## 2018-03-13 NOTE — ED NOTES
Bedside report given to Pati Hernández, [facility]. Pt resting on stretcher.

## 2018-03-13 NOTE — PROGRESS NOTES
Pharmacy Clarification of Prior to Admission Medication Regimen     The patient was interviewed regarding clarification of the prior to admission medication regimen and questioned regarding use of any other inhalers, topical products, over the counter medications, herbal medications, vitamin products or ophthalmic/nasal/otic medication use. Information Obtained From: Patient and Rx Query    Pertinent Pharmacy Findings:   METHADONE HCL (METHADONE PO): Patient stated that he takes 95 mg of liquid methadone daily at [facility] phone number [phone]. PTA medication list was corrected to the following:     Prior to Admission Medications   Prescriptions Last Dose Informant Patient Reported? Taking? METHADONE HCL (METHADONE PO) 3/13/2018 at Unknown time Self Yes Yes   Sig: Take 95 mg by mouth daily. Patient takes liquid methadone. allopurinol (ZYLOPRIM) 100 mg tablet 3/13/2018 at Unknown time Self No Yes   Sig: Take 1 Tab by mouth daily. aspirin delayed-release 81 mg tablet 3/13/2018 at Unknown time Self Yes Yes   Sig: Take 81 mg by mouth daily. colchicine (COLCRYS) 0.6 mg tablet 2/27/2018 at Unknown time Self No Yes   Sig: Take 1 Tab by mouth daily as needed. For gout flare   ibuprofen (ADVIL) 200 mg tablet 3/12/2018 at Unknown time Self Yes Yes   Sig: Take 1,200 mg by mouth daily as needed for Pain.   levothyroxine (SYNTHROID) 75 mcg tablet 3/13/2018 at Unknown time Self No Yes   Sig: Take 1 Tab by mouth Daily (before breakfast). lidocaine-prilocaine (EMLA) topical cream 3/12/2018 at Unknown time Self Yes Yes   Sig: Apply  to affected area three (3) times daily as needed for Pain. Patient applies a 'generous amount' to wound on top of left foot. lisinopril (PRINIVIL, ZESTRIL) 20 mg tablet 3/13/2018 at Unknown time Self No Yes   Sig: Take 1 Tab by mouth daily. pregabalin (LYRICA) 75 mg capsule 3/13/2018 at Unknown time Self Yes Yes   Sig: Take 150 mg by mouth two (2) times a day. Facility-Administered Medications: None          Thank you,  Savannah Obregon CPhT  Medication History Pharmacy Technician

## 2018-03-13 NOTE — ED NOTES
Bedside and Verbal shift change received from Chester Saunders RN. Report included the following information: SBAR, ED Summary, MAR and Recent Results. Patient requesting something to eat and topical anesthetic for foot.  Will follow up with MD.

## 2018-03-13 NOTE — ED NOTES
Pt ambulatory to ER. Pt c/o left foot pain. Pt had debridement done 1 month ago to left foot. Pt has had worsening pain. Pt has wound to left foot. Dressing in place.

## 2018-03-13 NOTE — ED NOTES
Nursing supervisor notified patient needs to go to vascular lab. Nursing supervisor will call them in.

## 2018-03-13 NOTE — IP AVS SNAPSHOT
[address] [facility] 
[phone] Patient: Erin Anguiano MRN: [id number] :1965 About your hospitalization You were admitted on:  2018 You last received care in the:  [facility] 2 GENERAL SURGERY You were discharged on:  2018 Why you were hospitalized Your primary diagnosis was:  Not on File Your diagnoses also included:  Chronic Pain, Left Foot Infection Follow-up Information Follow up With Details Comments Contact Info Lindsey Nicholson MD Schedule an appointment as soon as possible for a visit in 2 weeks  [address] [place] [address] I [address] [facility] 7 [address] 
[phone] Alem Tinoco MD Schedule an appointment as soon as possible for a visit in 2 weeks Rheumatology specialist [address] [place] [address] [address] [facility] 
[phone] Joann Simpson MD  call office this week to get time set up for Biopsies of the skin and muscle [address] [facility] 3 [address] [facility] 
[phone] [facility] [address] [place] [address] [address] Discharge Orders None A check valentine indicates which time of day the medication should be taken. My Medications CHANGE how you take these medications Instructions Each Dose to Equal  
 Morning Noon Evening Bedtime  
 aspirin delayed-release 81 mg tablet What changed:  Another medication with the same name was removed. Continue taking this medication, and follow the directions you see here. Your last dose was: Your next dose is: Take 81 mg by mouth daily. 81 mg  
    
   
   
   
  
 lisinopril 10 mg tablet Commonly known as:  Shawna Ramos What changed:   
- medication strength 
- how much to take Your last dose was: Your next dose is: Take 1 Tab by mouth daily. 10 mg METHADONE PO What changed:  Another medication with the same name was removed. Continue taking this medication, and follow the directions you see here. Your last dose was: Your next dose is: Take 95 mg by mouth daily. Patient takes liquid methadone. 95 mg CONTINUE taking these medications Instructions Each Dose to Equal  
 Morning Noon Evening Bedtime  
 allopurinol 100 mg tablet Commonly known as:  Lalo Pereira Your last dose was: Your next dose is: Take 1 Tab by mouth daily. 100 mg  
    
   
   
   
  
 colchicine 0.6 mg tablet Commonly known as:  COLCRYS Your last dose was: Your next dose is: Take 1 Tab by mouth daily as needed. For gout flare 0.6 mg  
    
   
   
   
  
 levothyroxine 75 mcg tablet Commonly known as:  SYNTHROID Your last dose was: Your next dose is: Take 1 Tab by mouth Daily (before breakfast). 75 mcg  
    
   
   
   
  
 lidocaine-prilocaine topical cream  
Commonly known as:  EMLA Your last dose was: Your next dose is:    
   
   
 Apply  to affected area three (3) times daily as needed for Pain. Patient applies a 'generous amount' to wound on top of left foot. LYRICA 75 mg capsule Generic drug:  pregabalin Your last dose was: Your next dose is: Take 150 mg by mouth two (2) times a day. 150 mg  
    
   
   
   
  
  
STOP taking these medications ADVIL 200 mg tablet Generic drug:  ibuprofen Where to Get Your Medications Information on where to get these meds will be given to you by the nurse or doctor. ! Ask your nurse or doctor about these medications  
  lisinopril 10 mg tablet Discharge Instructions Patient Discharge Instructions Rhona Barbcecilia / [id number] : 1965 Admitted 3/13/2018 Discharged: 3/24/2018 DISCHARGE DIAGNOSIS:  
 
Left leg pain and rash etiology unclear, ? Rheumatologic disease or limited scleroderma(morphea) Bladder mass Chronic methadone use, was in hospital at [facility] in [address] [place] [address] from 3/13/2018 to 3/26/2018, received his scheduled methadone dose while in the hospital, including Monday 3/26/2018 What to do at [facility] 1. Recommended diet: Regular Diet 2. Recommended activity: Activity as tolerated 3. If you experience any of the following symptoms then please call your primary care physician or return to the emergency room if you cannot get hold of your doctor: 
 Fevers > 100.5, chills Nausea or vomiting, persistent diarrhea > 24 hours Blood in stool or black stools Chest pain or SOB Follow-up Information Follow up With Details Comments Contact Info Anni Gillespie MD Schedule an appointment as soon as possible for a visit in 2 weeks  [address] [place] [address] [address] [facility] 7 [address] 
[phone] Abraham Tovar MD Schedule an appointment as soon as possible for a visit in 2 weeks  [address] [place] [address] Dr Monahan [facility] [place] 
[phone] Marla Bowman MD  call office on monday to get time set up for Biopsies of the skin and muscle [address] [facility] [address] [place] 
[phone] All pain medications must come from your pain clinic Very important to call Dr Robert's office this weeK to get a time set up for the biopsies of your left leg skin and muscle, we are trying to determine the etiology of your symptoms that might require a specific treatment Follow up with Dr Amanda Bates in 1 week after the biopsy is done Keep your scheduled appointment with your foot specialist or podiatrist 
 
Keep you scheduled appointment for the evaluation of the bladder mass at [facility] Wound care left foot EVEN DAYS 16, 18, 20, etc..WOUND Care for the Left foot wound:  Remove the old dressing. Apply the Topical lidocaine jelly 2% on top of the wound and allow it to set for 2 minutes. Cleanse the foot by spraying with Carraklenz and wipe Firmly but slowly ONCE with gauze to remove the old drainage and wound debris. Wipe in between the toes with gauze. Apply the Sensicare cream to the skin around the wound like a picture frame to protect the skin, including between the toes. Apply the iodosorb gel (brown) to the xeroform gauze and apply to the wound bed. Cover with a bulky amount of 4x4's and wrap with radha. Tape to secure the dressing for TWO days. Change every other day. Information obtained by : 
I understand that if any problems occur once I am at home I am to contact my physician. I understand and acknowledge receipt of the instructions indicated above. Physician's or R.N.'s Signature                                                                  Date/Time Patient or Representative Signature                                                          Date/Time [facility] Announcement We are excited to announce that we are making your provider's discharge notes available to you in [employer]. You will see these notes when they are completed and signed by the physician that discharged you from your recent hospital stay. If you have any questions or concerns about any information you see in [facility], please call the Health Information Department where you were seen or reach out to your Primary Care Provider for more information about your plan of care. Introducing [facility] & HEALTH SERVICES! Dear Karen Amado: Thank you for requesting a [employer] account. Our records indicate that you already have an active [employer] account. You can access your account anytime at [email or web address] Did you know that you can access your hospital and ER discharge instructions at any time in [employer]? You can also review all of your test results from your hospital stay or ER visit. Additional Information If you have questions, please visit the Frequently Asked Questions section of the [employer] website at [email or web address] Remember, [employer] is NOT to be used for urgent needs. For medical emergencies, dial 911. Now available from your iPhone and Android! Providers Seen During Your Hospitalization Provider Specialty Primary office phone Belinda Patel MD Emergency Medicine [phone] Dillon Tarango MD Emergency Medicine [phone] Liz Mckeon MD Internal Medicine MD [facility] [phone] Your Primary Care Physician (PCP) Primary Care Physician Office Phone Office Fax Azucena Scott [phone] You are allergic to the following Allergen Reactions Sulfamethoxazole-Trimethoprim Rash L eye and L hand Ciprofloxacin Hives Per pcp records Codeine Hives Tolerates dilaudid, oxycodone Lyrica (Pregabalin) Hives Sulfa (Sulfonamide Antibiotics) Rash Ultram (Tramadol) Hives Recent Documentation Height Weight BMI Smoking Status 1.854 m 100.4 kg 29.2 kg/m2 Current Every Day Smoker Emergency Contacts Name Discharge Info Relation Home Work Mobile Reina Henry DISCHARGE CAREGIVER [3] Other Relative [6] [phone] Dallin Valero DISCHARGE CAREGIVER [3] Other Relative [6] [phone] [address] CAREGIVER [3] Other Relative [6] [phone] Patient Belongings The following personal items are in your possession at time of discharge: 
  Dental Appliances: None  Visual Aid: None      Home Medications: None   Jewelry: None  Clothing: Shirt, Pants, With patient, Footwear    Other Valuables: None Please provide this summary of care documentation to your next provider. Signatures-by signing, you are acknowledging that this After Visit Summary has been reviewed with you and you have received a copy. Patient Signature:  ____________________________________________________________ Date:  ____________________________________________________________  
  
[facility] Provider Signature:  ____________________________________________________________ Date:  ____________________________________________________________

## 2018-03-13 NOTE — PROGRESS NOTES
Pharmacy Automatic Renal Dosing Protocol - Antimicrobials    Indication for Antimicrobials: Diabetic Foot Infection    Current Regimen of Each Antimicrobial:  Vancomycin  2500 mg x 1 then 1250 mg Q8H (Start Date 3/13; Day #  1)    Previous Antimicrobial Therapy:    Vancomycin Goal Level: 10-15    Measured / Extrapolated Vancomycin Level:     Significant Cultures: None      Labs:  Recent Labs      18   1611   CREA  0.72   BUN  15   WBC  11.8*     Temp (24hrs), Av.2 °F (36.8 °C), Min:98.2 °F (36.8 °C), Max:98.2 °F (36.8 °C)        Paralysis, amputations, malnutrition: No  Creatinine Clearance (mL/min) or Dialysis: 133  Impression/Plan:   Vancomycin 2500 mg x 1 then 1250 mg Q8H with anticipated trough of 14 mcg/ml. [facility] daily     Pharmacy will follow daily and adjust medications as appropriate for renal function and/or serum levels. Thank you,  Odell Diaz, [facility]      Recommended duration of therapy  [email or web address]. [email or web address]    Renal Dosing  [email or web address] pdf

## 2018-03-13 NOTE — ED PROVIDER NOTES
EMERGENCY DEPARTMENT HISTORY AND PHYSICAL EXAM      Date: 3/13/2018  Patient Name: Hoda Leblanc    History of Presenting Illness     Chief Complaint   Patient presents with    Foot Pain     Left foot pain from an injury a year ago. Not getting any better. Saw a foot doctor but didn't like her. Still having severe pain and drainage from a wound. History Provided By: Patient    HPI: Hoda Leblanc, 47 y.o. male with PMHx significant for HTN, sz, hypercholesterolemia, stroke with L sided deficits and gout, presents ambulatory to the ED for evaluation of progressively worsening, mild to moderate, L foot ulcer with pain that radiates up to his L knee with associated drainage and swelling x the past few days. He reports exacerbation with bearing weight. He states he has been taking Advil and using lidocaine with mild relief. He reports onset of symptoms s/p injury a year ago and notes he has been followed by ortho and podiatry for his injury. He notes he had L foot incision and drainage done on 12/29/17. He states Home Health comes to his house and he last had his dressing changed 3/9/18. He notes he is ambulatory with a cane at baseline. He endorses tobacco use. Pt specifically denies any fever, CP, or SOB. PCP: Miranda Sehldon MD    There are no other complaints, changes, or physical findings at this time.     Current Facility-Administered Medications   Medication Dose Route Frequency Provider Last Rate Last Dose    HYDROmorphone (PF) (DILAUDID) injection 1 mg  1 mg IntraVENous Q4H PRN Chayo Guerrero MD   1 mg at 03/14/18 1417    methadone (DOLOPHINE) 10 mg/mL concentrated solution 100 mg  100 mg Oral DAILY Chayo Guerrero MD   100 mg at 03/14/18 1158    albuterol-ipratropium (DUO-NEB) 2.5 MG-0.5 MG/3 ML  3 mL Nebulization Q6H PRN Antonio Portillo DO        Vancomycin trough at 0530, 1/2 hour before 0600 dose  1 Each Other ONCE Chayo Guerrero MD        sodium chloride (NS) flush 5-10 mL  5-10 mL IntraVENous Q8H Brian Zaman MD   10 mL at 03/14/18 2147    sodium chloride (NS) flush 5-10 mL  5-10 mL IntraVENous PRN Brian Zaman MD        [facility]) 1250 mg in  ml infusion  1,250 mg IntraVENous Q8H Brian Zaman  mL/hr at 03/14/18 2219 1,250 mg at 03/14/18 2219    sodium chloride (NS) flush 5-10 mL  5-10 mL IntraVENous Q8H Heavenly Holland MD   10 mL at 03/14/18 2147    sodium chloride (NS) flush 5-10 mL  5-10 mL IntraVENous PRN Heavenly Holland MD        acetaminophen (TYLENOL) tablet 650 mg  650 mg Oral Q4H PRN Heavenly Holland MD        HYDROmorphone (DILAUDID) tablet 2 mg  2 mg Oral Q4H PRN Heavenly Holland MD   2 mg at 03/15/18 0216    naloxone [facility]) injection 0.4 mg  0.4 mg IntraVENous PRN Heavenly Holland MD        diphenhydrAMINE (BENADRYL) capsule 25 mg  25 mg Oral Q4H PRN Heavenly Holland MD        ondansetron (ZOFRAN) injection 4 mg  4 mg IntraVENous Q4H PRN Heavenly Holland MD        nicotine (NICODERM CQ) 14 mg/24 hr patch 1 Patch  1 Patch TransDERmal Q24H Heavenly Holland MD   1 Patch at 03/13/18 2343    heparin (porcine) injection 5,000 Units  5,000 Units SubCUTAneous Q8H Heavenly Holland MD   5,000 Units at 03/14/18 1540    aspirin delayed-release tablet 81 mg  81 mg Oral DAILY Heavenly Holland MD   81 mg at 03/14/18 9417    pregabalin (LYRICA) capsule 150 mg  150 mg Oral BID Heavenly Holland MD   150 mg at 03/14/18 1734    allopurinol (ZYLOPRIM) tablet 100 mg  100 mg Oral DAILY Heavenly Holland MD   100 mg at 03/14/18 0230    levothyroxine (SYNTHROID) tablet 75 mcg  75 mcg Oral ACB Heavenly Holland MD   75 mcg at 03/14/18 4747    lisinopril (PRINIVIL, ZESTRIL) tablet 20 mg  20 mg Oral DAILY Heavenly Holland MD   20 mg at 03/14/18 4792    piperacillin-tazobactam (ZOSYN) 3.375 g in  ml premix/cmpd  3.375 g IntraVENous Q8H Rocío Lucio MD   3.375 g at 03/14/18 2141       Past History     Past Medical History:  Past Medical History:   Diagnosis Date    Aneurysm ([facility] [address] (with stroke)    Gout     Hypercholesterolemia     Hypertension     Lesion of bladder     Seizures ([facility] [address]     Stroke [facility]) 2006    left sided weakness    Thromboembolus ([facility] [address]     Thyroid disease     TIA (transient ischemic attack) 2012       Past Surgical History:  Past Surgical History:   Procedure Laterality Date    HX ORTHOPAEDIC      KNEE ARTHROSCP HARV      left knee       Family History:  Family History   Problem Relation Age of Onset    Diabetes Father     Diabetes Sister     Diabetes Brother        Social History:  Social History   Substance Use Topics    Smoking status: Current Every Day Smoker     Packs/day: 1.00    Smokeless tobacco: Never Used    Alcohol use 8.4 oz/week     14 Cans of beer per week       Allergies: Allergies   Allergen Reactions    Sulfamethoxazole-Trimethoprim Rash     L eye and L hand    Ciprofloxacin Hives     Per pcp records    Codeine Hives     Tolerates dilaudid, oxycodone    Lyrica [Pregabalin] Hives    Sulfa (Sulfonamide Antibiotics) Rash    Ultram [Tramadol] Hives         Review of Systems   Review of Systems   Constitutional: Negative. Negative for chills and fever. HENT: Negative for facial swelling, rhinorrhea, sore throat, trouble swallowing and voice change. Eyes: Negative. Respiratory: Negative. Negative for apnea, cough, chest tightness, shortness of breath and wheezing. Cardiovascular: Negative. Negative for chest pain, palpitations and leg swelling. Gastrointestinal: Negative. Negative for abdominal distention, abdominal pain, blood in stool, constipation, diarrhea, nausea and vomiting. Endocrine: Negative. Negative for cold intolerance, heat intolerance and polyuria. Genitourinary: Negative. Negative for difficulty urinating, dysuria, flank pain, frequency, hematuria and urgency. Musculoskeletal: Positive for myalgias (L foot and calf). Negative for arthralgias, back pain, neck pain and neck stiffness.    Skin: Positive for wound (L foot). Negative for color change and rash. Neurological: Negative. Negative for dizziness, syncope, facial asymmetry, speech difficulty, weakness, light-headedness, numbness and headaches. Hematological: Negative. Does not bruise/bleed easily. Psychiatric/Behavioral: Negative. Negative for confusion and self-injury. The patient is not nervous/anxious. Physical Exam   Physical Exam   Constitutional: He is oriented to person, place, and time. Vital signs are normal. He appears well-developed and well-nourished. He is cooperative. Non-toxic appearance. No distress. HENT:   Head: Normocephalic and atraumatic. Mouth/Throat: Mucous membranes are normal. No posterior oropharyngeal erythema. Eyes: Conjunctivae and EOM are normal. Pupils are equal, round, and reactive to light. Neck: Normal range of motion. Cardiovascular: Normal rate, regular rhythm, normal heart sounds and intact distal pulses. Exam reveals no gallop and no friction rub. No murmur heard. Faint but palpable DP pulses. Decreased cap refill. Pulmonary/Chest: Effort normal and breath sounds normal. No respiratory distress. He has no wheezes. He has no rales. He exhibits no tenderness. Abdominal: Soft. Bowel sounds are normal. He exhibits no distension and no mass. There is no tenderness. There is no rebound and no guarding. Musculoskeletal: Normal range of motion. He exhibits no edema or deformity. 1/5 strength in LUE and LLE, baseline per pt. Mild LLE calf tenderness. Bony TTP L great toe and 2nd toe with overlying erythema. Neurological: He is alert and oriented to person, place, and time. He displays normal reflexes. No cranial nerve deficit. He exhibits normal muscle tone. Coordination normal.   Skin: Skin is warm. No rash noted. Necrotic 4 x 3 cm ulcerated wound on L foot down to muscle with surrounding purulence. Psychiatric: He has a normal mood and affect.    Nursing note and vitals reviewed. Diagnostic Study Results     Labs -     No results found for this or any previous visit (from the past 12 hour(s)). Radiologic Studies -   ANKLE BRACHIAL INDEX         DUPLEX LOWER EXT VENOUS LEFT   Final Result      XR FOOT LT MIN 3 V   Final Result      MRI FOOT LT WO CONT    (Results Pending)     LEFT LOWER EXTREMITY VENOUS DUPLEX ULTRASOUND     INDICATION: Left leg swelling, pain, DVT suspected. Long-term left foot pain  after injury, with recent increase in pain. History of DVT 5 years ago. Foot  surgery one month ago.     COMPARISON: None.     PROCEDURE: Grayscale, color, and spectral Doppler ultrasound imaging of the left  lower extremity veins was performed.     FINDINGS:      The left common femoral, superficial femoral, and popliteal veins demonstrate  normal flow and response to augmentation. Grayscale images are compromised by  patient body habitus.     The visualized calf veins are patent by color flow.      IMPRESSION  IMPRESSION:   Deep venous system patent by color flow. EXAM:  XR FOOT LT MIN 3 V     INDICATION:   left foot pain and swelling; wound to foot x 1 year with poor  podiatry follow-up. .     COMPARISON:  None.     FINDINGS:  Three views of the left foot. There is diffuse osteopenia. There is  more focal osteopenia at the medial base of the first proximal phalanx.     IMPRESSION  IMPRESSION:  Focal osteopenia at the medial base of the first proximal phalanx. This can be seen with osteomyelitis. Medical Decision Making   I am the first provider for this patient. I reviewed the vital signs, available nursing notes, past medical history, past surgical history, family history and social history. Vital Signs-Reviewed the patient's vital signs. No data found.       Pulse Oximetry Analysis - 97% on RA    Cardiac Monitor:   Rate: 77 bpm    Records Reviewed: Nursing Notes, Old Medical Records, Previous electrocardiograms, Previous Radiology Studies and Previous Laboratory Studies    Provider Notes (Medical Decision Making):   DDx: PVD, cellulitis, osteomyelitis    48 yo M followed by podiatry with recent debridement for L foot wound with worsening pain, drainage, and swelling. Afebrile. Non toxic. Will obtain plain film of L foot, EUGENE, labs, US of LLE. Will check ESR/CRP to eval for underlying osteo. Will treat emperically with abx and reassess need for admission. ED Course:   Initial assessment performed. The patients presenting problems have been discussed, and they are in agreement with the care plan formulated and outlined with them. I have encouraged them to ask questions as they arise throughout their visit. TOBACCO COUNSELING:  Upon evaluation, pt expressed that they are a current tobacco user. For approximately 10 minutes, pt has been counseled on the dangers of smoking and was encouraged to quit as soon as possible in order to decrease further risks to their health. Pt has conveyed their understanding of the risks involved should they continue to use tobacco products. Consult Note:  6:53 Carlin Boudreaux MD spoke with Dr. Vinh Chaudhry  Specialty: Orthopedics  Discussed pts hx, disposition, and available diagnostic and imaging results. Reviewed care plans. Consultant agrees with plans as outlined. Recommends consulting podiatry or Dr. Jessica Day. .    CONSULT NOTE:   6:59 PM  Mima Francisco MD spoke with Dr. Deisi Doll,   Specialty: Hospitalist  Discussed pt's hx, disposition, and available diagnostic and imaging results. Reviewed care plans. Consultant will evaluate pt for admission. Recommends calling podiatry. Written by MAMADOU Love, as dictated by Mima Francisco MD.    7:20 PM  Spoke with Dr. Katie Sinclair, podiatry. She states that he is no longer a patient in their practice due to his non compliance. She notes she recommend he follow up with vascular.   Written by MAMADOU Love, as dictated by Mima Francisco MD.    Consult Note:  7:26 Carlni Boudreaux MD spoke with Dr. Emry Gilford  Specialty: Orthopedics  Discussed pts hx, disposition, and available diagnostic and imaging results. Reviewed care plans. Consultant agrees with plans as outlined. Recommends admission for further work up. Will consult during admission. Recommends calling in Jose Tinajero.     7:30 PM  Updated LUDIVINA Tinajero regarding pt. Written by Sary Villalpando, ED Scribe, as dictated by Eron Melgar MD.    Disposition:  7:50 PM  Patient is being admitted to the hospital. The results of their tests and reasons for their admission have been discussed with them and/or available family. They convey agreement and understanding for the need to be admitted and for their admission diagnosis. Consultation has been made with the inpatient physician specialist for hospitalization. PLAN:  1. Admit to hospitalist.     Return to ED if worse     Diagnosis     Clinical Impression:   1. Osteomyelitis of left foot, unspecified type ([address]    2. Left leg cellulitis    3. Skin ulcer of left foot with necrosis of muscle ([address]    4. Accelerated hypertension        Attestations: This note is prepared by Sary Villalpando, acting as Scribe for Eron Melgar MD.    Eron Melgar MD: The scribe's documentation has been prepared under my direction and personally reviewed by me in its entirety. I confirm that the note above accurately reflects all work, treatment, procedures, and medical decision making performed by me. This note will not be viewable in [address]

## 2018-03-14 ENCOUNTER — APPOINTMENT (OUTPATIENT)
Dept: MRI IMAGING | Age: 53
DRG: 380 | End: 2018-03-14
Attending: PHYSICIAN ASSISTANT
Payer: MEDICAID

## 2018-03-14 LAB
ANION GAP SERPL CALC-SCNC: 7 MMOL/L (ref 5–15)
BASOPHILS # BLD: 0.1 K/UL (ref 0–0.1)
BASOPHILS NFR BLD: 1 % (ref 0–1)
BUN SERPL-MCNC: 14 MG/DL (ref 6–20)
BUN/CREAT SERPL: 24 (ref 12–20)
CALCIUM SERPL-MCNC: 8.6 MG/DL (ref 8.5–10.1)
CHLORIDE SERPL-SCNC: 107 MMOL/L (ref 97–108)
CO2 SERPL-SCNC: 27 MMOL/L (ref 21–32)
CREAT SERPL-MCNC: 0.58 MG/DL (ref 0.7–1.3)
DIFFERENTIAL METHOD BLD: ABNORMAL
EOSINOPHIL # BLD: 0.6 K/UL (ref 0–0.4)
EOSINOPHIL NFR BLD: 7 % (ref 0–7)
ERYTHROCYTE [DISTWIDTH] IN BLOOD BY AUTOMATED COUNT: 17 % (ref 11.5–14.5)
EST. AVERAGE GLUCOSE BLD GHB EST-MCNC: NORMAL MG/DL
GLUCOSE SERPL-MCNC: 102 MG/DL (ref 65–100)
HBA1C MFR BLD: 4.8 % (ref 4.2–6.3)
HCT VFR BLD AUTO: 37.4 % (ref 36.6–50.3)
HGB BLD-MCNC: 12 G/DL (ref 12.1–17)
IMM GRANULOCYTES # BLD: 0.1 K/UL (ref 0–0.04)
IMM GRANULOCYTES NFR BLD AUTO: 1 % (ref 0–0.5)
LYMPHOCYTES # BLD: 1.6 K/UL (ref 0.8–3.5)
LYMPHOCYTES NFR BLD: 18 % (ref 12–49)
MAGNESIUM SERPL-MCNC: 2.1 MG/DL (ref 1.6–2.4)
MCH RBC QN AUTO: 28.5 PG (ref 26–34)
MCHC RBC AUTO-ENTMCNC: 32.1 G/DL (ref 30–36.5)
MCV RBC AUTO: 88.8 FL (ref 80–99)
MONOCYTES # BLD: 0.9 K/UL (ref 0–1)
MONOCYTES NFR BLD: 10 % (ref 5–13)
NEUTS SEG # BLD: 5.9 K/UL (ref 1.8–8)
NEUTS SEG NFR BLD: 65 % (ref 32–75)
NRBC # BLD: 0 K/UL (ref 0–0.01)
NRBC BLD-RTO: 0 PER 100 WBC
PHOSPHATE SERPL-MCNC: 4.4 MG/DL (ref 2.6–4.7)
PLATELET # BLD AUTO: 180 K/UL (ref 150–400)
PMV BLD AUTO: 10.7 FL (ref 8.9–12.9)
POTASSIUM SERPL-SCNC: 4.2 MMOL/L (ref 3.5–5.1)
RBC # BLD AUTO: 4.21 M/UL (ref 4.1–5.7)
SODIUM SERPL-SCNC: 141 MMOL/L (ref 136–145)
T4 FREE SERPL-MCNC: 0.9 NG/DL (ref 0.8–1.5)
TSH SERPL DL<=0.05 MIU/L-ACNC: 11.1 UIU/ML (ref 0.36–3.74)
WBC # BLD AUTO: 9.1 K/UL (ref 4.1–11.1)

## 2018-03-14 PROCEDURE — 74011250636 HC RX REV CODE- 250/636: Performed by: EMERGENCY MEDICINE

## 2018-03-14 PROCEDURE — 84443 ASSAY THYROID STIM HORMONE: CPT | Performed by: EMERGENCY MEDICINE

## 2018-03-14 PROCEDURE — 76450000000

## 2018-03-14 PROCEDURE — 84439 ASSAY OF FREE THYROXINE: CPT

## 2018-03-14 PROCEDURE — 36415 COLL VENOUS BLD VENIPUNCTURE: CPT | Performed by: EMERGENCY MEDICINE

## 2018-03-14 PROCEDURE — 74011250636 HC RX REV CODE- 250/636: Performed by: INTERNAL MEDICINE

## 2018-03-14 PROCEDURE — 77030029684 HC NEB SM VOL KT MONA -A

## 2018-03-14 PROCEDURE — 83735 ASSAY OF MAGNESIUM: CPT | Performed by: EMERGENCY MEDICINE

## 2018-03-14 PROCEDURE — 85025 COMPLETE CBC W/AUTO DIFF WBC: CPT | Performed by: EMERGENCY MEDICINE

## 2018-03-14 PROCEDURE — 65270000029 HC RM PRIVATE

## 2018-03-14 PROCEDURE — 74011250637 HC RX REV CODE- 250/637: Performed by: INTERNAL MEDICINE

## 2018-03-14 PROCEDURE — 84100 ASSAY OF PHOSPHORUS: CPT | Performed by: EMERGENCY MEDICINE

## 2018-03-14 PROCEDURE — 80048 BASIC METABOLIC PNL TOTAL CA: CPT | Performed by: EMERGENCY MEDICINE

## 2018-03-14 PROCEDURE — 83036 HEMOGLOBIN GLYCOSYLATED A1C: CPT

## 2018-03-14 PROCEDURE — 74011250637 HC RX REV CODE- 250/637: Performed by: EMERGENCY MEDICINE

## 2018-03-14 RX ORDER — METHADONE HYDROCHLORIDE 10 MG/ML
100 CONCENTRATE ORAL DAILY
Status: DISCONTINUED | OUTPATIENT
Start: 2018-03-14 | End: 2018-03-14

## 2018-03-14 RX ORDER — HYDROMORPHONE HYDROCHLORIDE 2 MG/ML
1 INJECTION, SOLUTION INTRAMUSCULAR; INTRAVENOUS; SUBCUTANEOUS
Status: DISCONTINUED | OUTPATIENT
Start: 2018-03-14 | End: 2018-03-17

## 2018-03-14 RX ORDER — IPRATROPIUM BROMIDE AND ALBUTEROL SULFATE 2.5; .5 MG/3ML; MG/3ML
3 SOLUTION RESPIRATORY (INHALATION)
Status: DISCONTINUED | OUTPATIENT
Start: 2018-03-14 | End: 2018-03-26 | Stop reason: HOSPADM

## 2018-03-14 RX ORDER — METHADONE HYDROCHLORIDE 10 MG/ML
100 CONCENTRATE ORAL DAILY
Status: DISCONTINUED | OUTPATIENT
Start: 2018-03-14 | End: 2018-03-16

## 2018-03-14 RX ADMIN — PIPERACILLIN SODIUM AND TAZOBACTAM SODIUM 3.38 G: .375; 3 INJECTION, POWDER, LYOPHILIZED, FOR SOLUTION INTRAVENOUS at 21:41

## 2018-03-14 RX ADMIN — HEPARIN SODIUM 5000 UNITS: 5000 INJECTION, SOLUTION INTRAVENOUS; SUBCUTANEOUS at 06:28

## 2018-03-14 RX ADMIN — Medication 5 ML: at 15:34

## 2018-03-14 RX ADMIN — Medication 10 ML: at 21:47

## 2018-03-14 RX ADMIN — METHADONE HYDROCHLORIDE 100 MG: 10 CONCENTRATE ORAL at 11:58

## 2018-03-14 RX ADMIN — PREGABALIN 150 MG: 150 CAPSULE ORAL at 17:34

## 2018-03-14 RX ADMIN — LISINOPRIL 20 MG: 20 TABLET ORAL at 08:08

## 2018-03-14 RX ADMIN — PIPERACILLIN SODIUM AND TAZOBACTAM SODIUM 3.38 G: .375; 3 INJECTION, POWDER, LYOPHILIZED, FOR SOLUTION INTRAVENOUS at 18:23

## 2018-03-14 RX ADMIN — HYDROMORPHONE HYDROCHLORIDE 0.5 MG: 2 INJECTION, SOLUTION INTRAMUSCULAR; INTRAVENOUS; SUBCUTANEOUS at 08:24

## 2018-03-14 RX ADMIN — HYDROMORPHONE HYDROCHLORIDE 0.5 MG: 2 INJECTION, SOLUTION INTRAMUSCULAR; INTRAVENOUS; SUBCUTANEOUS at 04:20

## 2018-03-14 RX ADMIN — Medication 5 ML: at 08:34

## 2018-03-14 RX ADMIN — LEVOTHYROXINE SODIUM 75 MCG: 25 TABLET ORAL at 08:08

## 2018-03-14 RX ADMIN — HYDROMORPHONE HYDROCHLORIDE 2 MG: 2 TABLET ORAL at 22:19

## 2018-03-14 RX ADMIN — VANCOMYCIN HYDROCHLORIDE 1250 MG: 10 INJECTION, POWDER, LYOPHILIZED, FOR SOLUTION INTRAVENOUS at 06:28

## 2018-03-14 RX ADMIN — HYDROMORPHONE HYDROCHLORIDE 0.5 MG: 2 INJECTION, SOLUTION INTRAMUSCULAR; INTRAVENOUS; SUBCUTANEOUS at 00:30

## 2018-03-14 RX ADMIN — PIPERACILLIN SODIUM AND TAZOBACTAM SODIUM 3.38 G: .375; 3 INJECTION, POWDER, LYOPHILIZED, FOR SOLUTION INTRAVENOUS at 08:34

## 2018-03-14 RX ADMIN — ASPIRIN 81 MG: 81 TABLET, COATED ORAL at 08:08

## 2018-03-14 RX ADMIN — ALLOPURINOL 100 MG: 100 TABLET ORAL at 08:08

## 2018-03-14 RX ADMIN — HYDROMORPHONE HYDROCHLORIDE 1 MG: 2 INJECTION, SOLUTION INTRAMUSCULAR; INTRAVENOUS; SUBCUTANEOUS at 14:17

## 2018-03-14 RX ADMIN — PREGABALIN 150 MG: 150 CAPSULE ORAL at 08:08

## 2018-03-14 RX ADMIN — HEPARIN SODIUM 5000 UNITS: 5000 INJECTION, SOLUTION INTRAVENOUS; SUBCUTANEOUS at 15:40

## 2018-03-14 RX ADMIN — HYDROMORPHONE HYDROCHLORIDE 2 MG: 2 TABLET ORAL at 18:23

## 2018-03-14 RX ADMIN — VANCOMYCIN HYDROCHLORIDE 1250 MG: 10 INJECTION, POWDER, LYOPHILIZED, FOR SOLUTION INTRAVENOUS at 22:19

## 2018-03-14 RX ADMIN — VANCOMYCIN HYDROCHLORIDE 1250 MG: 10 INJECTION, POWDER, LYOPHILIZED, FOR SOLUTION INTRAVENOUS at 15:40

## 2018-03-14 RX ADMIN — HYDROMORPHONE HYDROCHLORIDE 0.5 MG: 2 INJECTION, SOLUTION INTRAMUSCULAR; INTRAVENOUS; SUBCUTANEOUS at 07:57

## 2018-03-14 NOTE — PROGRESS NOTES
Spiritual Care Assessment/Progress Note  [facility]      NAME: Toro Davila      MRN: [id number]  AGE: 47 y.o.  SEX: male  Scientologist Affiliation: Mu-ism   Language: English     3/14/2018     Total Time (in minutes): 17     Spiritual Assessment begun in [facility]ERY through conversation with:         [x]Patient        [] Family    [] Friend(s)        Reason for Consult: Palliative Care, Initial/Spiritual Assessment     Spiritual beliefs: (Please include comment if needed)     [x] Involved in a kendy tradition/spiritual practice:     [] Supported by a kendy community:      [] Claims no spiritual orientation:      [] Seeking spiritual identity:           [] Adheres to an individual form of spirituality:      [] Not able to assess:                     Identified resources for coping:      [x] Prayer                  [] Devotional reading               [] Music                  [] Guided Imagery     [] Family/friends                 [] Pet visits     [] Other:        Interventions offered during this visit: (See comments for more details)    Patient Interventions: Affirmation of emotions/emotional suffering, Coping skills reviewed/reinforced, Prayer (actual), Iconic (affirming the presence of God/Higher Power), Scientologist beliefs/image of God discussed, Initial/Spiritual assessment, patient floor, Catharsis/review of pertinent events in supportive environment           Plan of Care:     [x] Discuss Spiritual/Cultural needs    [] Support AMD and/or advance care planning process      [] Support grieving process   [] Coordinate Rites/Rituals    [] Coordination with community clergy   [] No spiritual needs identified at this time   [] Detailed Plan of Care below (See Comments)  [] Make referral to Music Therapy  [] Make referral to Pet Therapy     [] Make referral to Addiction services  [] Make referral to [facility]  [] Make referral to Spiritual Care Partner  [] No future visits requested Initial Spiritual Assessment in 2141. Pt had been listed as Palliative pt earlier in the day but was no longer listed as Palliative. Pt awake and alert, engaged with  regarding issues with his foot leading to hospitalization along with associated feelings. Pt overwhelmed with possibility of needing some form of amputation and living situation which seems to have fallen apart just prior to hospitalization. Pt shared that he is a saved Lazarus Montiel and prays several times a day but he's not sure how much more he can handle. Provided empathic listening and words of reassurance and affirmation. Once pt's food arrived, pt sat up on side of bed and prepared to eat. Concluded visit with prayer to allow pt to eat and advised of availability leaving card. Pt seemed to indicate that he would call on , and asked where chapel was located. TAQUERIA Barrios. Giorgi Rojas

## 2018-03-14 NOTE — PROGRESS NOTES
Confirmed with MRI that patient has had no prior brain surgeries. Patient signed and filled out form appropriately and was faxed to MRI at 0830. MRI scheduled for this evening.

## 2018-03-14 NOTE — ED NOTES
TRANSFER - OUT REPORT:    Verbal report given to Jose Gill RN (name) on Ramandeep Atkinson  being transferred to General Surgery (unit) for routine progression of care       Report consisted of patients Situation, Background, Assessment and   Recommendations(SBAR). Information from the following report(s) SBAR, Kardex, ED Summary, MAR, Accordion, Recent Results and Med Rec Status was reviewed with the receiving nurse. Lines:   Peripheral IV 03/13/18 Right Antecubital (Active)   Site Assessment Clean, dry, & intact 3/13/2018  6:21 PM   Phlebitis Assessment 0 3/13/2018  6:21 PM   Infiltration Assessment 0 3/13/2018  6:21 PM   Dressing Status Clean, dry, & intact 3/13/2018  6:21 PM   Dressing Type Transparent 3/13/2018  6:21 PM   Hub Color/Line Status Pink;Flushed 3/13/2018  6:21 PM   Alcohol Cap Used Yes 3/13/2018  6:21 PM        Opportunity for questions and clarification was provided.

## 2018-03-14 NOTE — PROGRESS NOTES
Hospitalist Progress Note    NAME: Sayda Matthews   :  1965   MRN:  [id number]   LOS:   1      Assessment / Plan:  Acute left leg cellulitis, chronic wound, likely underlying osteomyelitis  -appreciate ortho evaluation  -checking MRI should be done later this evening  -will continue vancomycin and zosyn for now  -follow blood cultures  -EUGENE negative for PAD, hba1c normal    Chronic pain on methadone  -called patient's methadone clinic [facility], confirmed patient's daily dose of methadone is 100 mg   -ordered patient's home dose daily discussed with pharmacy  -dilaudid PRN breakthrough pain    Hypertension  -lisinopril    HLD  -not on medication    Gout  -allopurinol    Hypothyroidism  -synthroid, TSH mildly elevated T4 normal    Bladder mass  -plan for outpatient follow up    Tobacco use  -nicotine patch prn    Code status: Full  Prophylaxis: Hep SQ  Recommended Disposition: pending hospital course and PT eval     Subjective:     Chief Complaint: foot pain  patient in significant amount of pain, states his home methadone works, goes to [address] [place] [address] everyday for dose      Objective:     VITALS:   Last 24hrs VS reviewed since prior progress note. Most recent are:  Patient Vitals for the past 24 hrs:   Temp Pulse Resp BP SpO2   18 1107 98.2 °F (36.8 °C) 61 16 139/81 97 %   18 0735 98.1 °F (36.7 °C) 67 16 147/86 97 %   18 0452 98.3 °F (36.8 °C) (!) 59 16 (!) 159/91 96 %   18 0014 97.9 °F (36.6 °C) 61 17 144/87 97 %   180 - - - 127/62 96 %   180 - - - 141/77 96 %   18 - - - 136/64 96 %   18 - - - - 98 %   18 1930 - 67 16 (!) 181/98 98 %   18 1555 98.2 °F (36.8 °C) 75 20 173/82 97 %     No intake or output data in the 24 hours ending 18 1413     PHYSICAL EXAM:  General: Alert, cooperative, no acute distress    EENT:  EOMI. Anicteric sclerae. Mucous membranes moist  Resp:  CTA bilaterally, no wheezing or rales.  No accessory muscle use  CV:  Regular rhythm, no edema  GI:  Soft, non distended, non tender. +Bowel sounds  Neurologic:  Alert and oriented X 3, normal speech  Psych:   Good insight, not anxious nor agitated  Skin:  Large dark ulcer over left dorsum of foot, no obvious drainage  ________________________________________________________________________  Care Plan discussed with:    Comments   Patient x    Family      RN x    Care Manager     Consultant                        Multidiciplinary team rounds were held today with , nursing, pharmacist and clinical coordinator. Patient's plan of care was discussed; medications were reviewed and discharge planning was addressed. ________________________________________________________________________  Total NON critical care TIME:  30   Minutes    >50% of visit spent in counseling and coordination of care       ________________________________________________________________________    Procedures: see electronic medical records for all procedures/Xrays and details which were not copied into this note but were reviewed prior to creation of Plan. LABS:  I reviewed today's most current labs and imaging studies.   Pertinent labs include:  Recent Labs      03/14/18   0600  03/13/18   1611   WBC  9.1  11.8*   HGB  12.0*  13.0   HCT  37.4  39.6   PLT  180  238     Recent Labs      03/14/18   0600  03/13/18   1611   NA  141  141   K  4.2  4.9   CL  107  105   CO2  27  28   GLU  102*  109*   BUN  14  15   CREA  0.58*  0.72   CA  8.6  8.9   MG  2.1   --    PHOS  4.4   --        Signed: Sandip Carballo MD

## 2018-03-14 NOTE — WOUND CARE
Wound Care Consult: Chart reviewed. Spoke with his Nurse, Kim Kelly today about the wound as the patient in down in MRI currently. Discussed the wound, including the drainage, with the photo that was taken by Dr. Jessica Day. Karin Storm The red part is now black and fairly dry and the plantar aspect of the foot is apparently WNL. The periwound / proximal foot is dry and scaly. Recommended treatment: Cleanse the foot, including between the toes with a CHG wipe and dry the foot well. Apply Betadine to the foot wound and immediate surrounding skin and allow it to dry well for about 2 minutes. Place small pieces of gauze between the toes and then dress the foot with 4x4's on the bottom side and the top of the foot and wrap with Witner Thalia.     Albaro Lorenzo, RN, BSN, [employer]

## 2018-03-14 NOTE — CONSULTS
ORTHOPAEDIC CONSULT NOTE    Subjective:     Date of Consultation:  March 13, 2018      Ximena Hernandez is a 47 y.o. male who is being seen for left foot pain, possible infection. Pt reports worsening left foot pain over the last two weeks. Throbbing at baseline with acute worsening with any motion/contact/wt bearing. Ambulates at baseline unassisted. Pt. Denies injury or trauma. States he has a procedure in Dec 2017 by Dr Wei(I&D), but doesn't care to see her again. .   Denies fever or chills.   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    Brain aneurysm 12/30/2013    Chronic pain 12/30/2013    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/2013    Spasticity 12/30/2013    Cerebral infarction ([address] 12/12/2012    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    Cerebral hemorrhage with hemiparesis ([address] 04/15/2011    Central pain syndrome 04/15/2011    Stroke ([address] 03/11/2011    Hypertension 03/11/2011    Thromboembolism ([address] 03/11/2011     Family History   Problem Relation Age of Onset    Diabetes Father     Diabetes Sister     Diabetes Brother       Social History   Substance Use Topics    Smoking status: Current Every Day Smoker     Packs/day: 1.00    Smokeless tobacco: Never Used    Alcohol use 8.4 oz/week     14 Cans of beer per week     Past Medical History:   Diagnosis Date    Aneurysm ([address]     (with stroke)    Gout     Hypercholesterolemia     Hypertension     Lesion of bladder     Seizures ([address]     Stroke [facility]) 2006    left sided weakness    Thromboembolus ([address]     Thyroid disease     TIA (transient ischemic attack) 2012      Past Surgical History:   Procedure Laterality Date    HX ORTHOPAEDIC      KNEE ARTHROSCP HARV      left knee      Prior to Admission medications    Medication Sig Start Date End Date Taking? Authorizing Provider   aspirin delayed-release 81 mg tablet Take 81 mg by mouth daily. Yes Historical Provider   lidocaine-prilocaine (EMLA) topical cream Apply  to affected area three (3) times daily as needed for Pain. Patient applies a 'generous amount' to wound on top of left foot. Yes Historical Provider   pregabalin (LYRICA) 75 mg capsule Take 150 mg by mouth two (2) times a day. Yes Historical Provider   ibuprofen (ADVIL) 200 mg tablet Take 1,200 mg by mouth daily as needed for Pain. Yes Historical Provider   METHADONE HCL (METHADONE PO) Take 95 mg by mouth daily. Patient takes liquid methadone. Yes Historical Provider   colchicine (COLCRYS) 0.6 mg tablet Take 1 Tab by mouth daily as needed. For gout flare 1/12/18  Yes Evin Lawson MD   allopurinol (ZYLOPRIM) 100 mg tablet Take 1 Tab by mouth daily. 1/12/18  Yes Evin Lawson MD   levothyroxine (SYNTHROID) 75 mcg tablet Take 1 Tab by mouth Daily (before breakfast). 1/12/18  Yes Evin Lawson MD   lisinopril (PRINIVIL, ZESTRIL) 20 mg tablet Take 1 Tab by mouth daily.  1/12/18  Yes Evin Lawson MD     Current Facility-Administered Medications   Medication Dose Route Frequency    sodium chloride (NS) flush 5-10 mL  5-10 mL IntraVENous Q8H    sodium chloride (NS) flush 5-10 mL  5-10 mL IntraVENous PRN    vancomycin (VANCOCIN) 2,500 mg in 0.9% sodium chloride 500 mL IVPB  2,500 mg IntraVENous ONCE    [START ON 3/14/2018] vancomycin (VANCOCIN) 1250 mg in  ml infusion  1,250 mg IntraVENous Q8H    piperacillin-tazobactam (ZOSYN) 3.375 g in  ml premix/cmpd  3.375 g IntraVENous Q6H    sodium chloride (NS) flush 5-10 mL  5-10 mL IntraVENous Q8H    sodium chloride (NS) flush 5-10 mL  5-10 mL IntraVENous PRN    acetaminophen (TYLENOL) tablet 650 mg  650 mg Oral Q4H PRN    HYDROmorphone (PF) (DILAUDID) injection 0.5 mg  0.5 mg IntraVENous Q4H PRN    HYDROmorphone (DILAUDID) tablet 2 mg  2 mg Oral Q4H PRN    naloxone (NARCAN) injection 0.4 mg  0.4 mg IntraVENous PRN    diphenhydrAMINE (BENADRYL) capsule 25 mg  25 mg Oral Q4H PRN    ondansetron (ZOFRAN) injection 4 mg  4 mg IntraVENous Q4H PRN    nicotine (NICODERM CQ) 14 mg/24 hr patch 1 Patch  1 Patch TransDERmal Q24H    heparin (porcine) injection 5,000 Units  5,000 Units SubCUTAneous Q8H    [START ON 3/14/2018] aspirin delayed-release tablet 81 mg  81 mg Oral DAILY    [START ON 3/14/2018] pregabalin (LYRICA) capsule 150 mg  150 mg Oral BID    [START ON 3/14/2018] allopurinol (ZYLOPRIM) tablet 100 mg  100 mg Oral DAILY    [START ON 3/14/2018] levothyroxine (SYNTHROID) tablet 75 mcg  75 mcg Oral ACB    [START ON 3/14/2018] lisinopril (PRINIVIL, ZESTRIL) tablet 20 mg  20 mg Oral DAILY    piperacillin-tazobactam (ZOSYN) 3.375 g in  ml premix/cmpd  3.375 g IntraVENous NOW     Current Outpatient Prescriptions   Medication Sig    aspirin delayed-release 81 mg tablet Take 81 mg by mouth daily.  lidocaine-prilocaine (EMLA) topical cream Apply  to affected area three (3) times daily as needed for Pain. Patient applies a 'generous amount' to wound on top of left foot.  pregabalin (LYRICA) 75 mg capsule Take 150 mg by mouth two (2) times a day.  ibuprofen (ADVIL) 200 mg tablet Take 1,200 mg by mouth daily as needed for Pain.  METHADONE HCL (METHADONE PO) Take 95 mg by mouth daily. Patient takes liquid methadone.  colchicine (COLCRYS) 0.6 mg tablet Take 1 Tab by mouth daily as needed. For gout flare    allopurinol (ZYLOPRIM) 100 mg tablet Take 1 Tab by mouth daily.     levothyroxine (SYNTHROID) 75 mcg tablet Take 1 Tab by mouth Daily (before breakfast).  lisinopril (PRINIVIL, ZESTRIL) 20 mg tablet Take 1 Tab by mouth daily. Allergies   Allergen Reactions    Sulfamethoxazole-Trimethoprim Rash     L eye and L hand    Ciprofloxacin Hives     Per pcp records    Codeine Hives     Tolerates dilaudid, oxycodone    Lyrica [Pregabalin] Hives    Sulfa (Sulfonamide Antibiotics) Rash    Ultram [Tramadol] Hives        Review of Systems:  A comprehensive review of systems was negative except for that written in the HPI. Mental Status: no dementia    Objective:     Patient Vitals for the past 8 hrs:   BP Temp Pulse Resp SpO2 Height Weight   18 136/64 - - - 96 % - -   18 1948 - - - - 98 % - -   18 1930 (!) 181/98 - 67 16 98 % - -   18 1555 173/82 98.2 °F (36.8 °C) 75 20 97 % 6' 1\" (1.854 m) 100.4 kg (221 lb 5.5 oz)     Temp (24hrs), Av.2 °F (36.8 °C), Min:98.2 °F (36.8 °C), Max:98.2 °F (36.8 °C)      Gen: Well-developed,  in no acute distress   HEENT: Pink conjunctivae, hearing intact to voice, moist mucous membranes   Neck: Supple  Resp: No respiratory distress   Card: RRR, palpable distal pulse-equal bilaterally, birsk cap refill all distal digits   Musc: left foot with darkened skin lesion(3cm). Mild LE swelling. Acute TTP throughout the foot and digits, states he had an CVA/aneurysm and \"can't always move my left leg\" or unwilling to move ankle ortoes  Skin: No skin breakdown noted. Skin warm, pink, dry  Neuro: Cranial nerves are grossly intact, no focal motor weakness, follows commands appropriately   Psych: apathetic Good insight, oriented to person, place and time, alert          Imaging Review: XR FOOT LT MIN 3 V  INDICATION:   left foot pain and swelling; wound to foot x 1 year with poor podiatry follow-up. .  COMPARISON:  None. FINDINGS:  Three views of the left foot. There is diffuse osteopenia. There is more focal osteopenia at the medial base of the first proximal phalanx.   IMPRESSION:  Focal osteopenia at the medial base of the first proximal phalanx. This can be seen with osteomyelitis. LLE DUPLEX- NO VTE    Labs:   Recent Results (from the past 24 hour(s))   CBC WITH AUTOMATED DIFF    Collection Time: 03/13/18  4:11 PM   Result Value Ref Range    WBC 11.8 (H) 4.1 - 11.1 K/uL    RBC 4.48 4.10 - 5.70 M/uL    HGB 13.0 12.1 - 17.0 g/dL    HCT 39.6 36.6 - 50.3 %    MCV 88.4 80.0 - 99.0 FL    MCH 29.0 26.0 - 34.0 PG    MCHC 32.8 30.0 - 36.5 g/dL    RDW 17.1 (H) 11.5 - 14.5 %    PLATELET 209 746 - 670 K/uL    MPV 10.7 8.9 - 12.9 FL    NRBC 0.0 0  WBC    ABSOLUTE NRBC 0.00 0.00 - 0.01 K/uL    NEUTROPHILS 68 32 - 75 %    LYMPHOCYTES 21 12 - 49 %    MONOCYTES 7 5 - 13 %    EOSINOPHILS 3 0 - 7 %    BASOPHILS 0 0 - 1 %    IMMATURE GRANULOCYTES 1 (H) 0.0 - 0.5 %    ABS. NEUTROPHILS 8.0 1.8 - 8.0 K/UL    ABS. LYMPHOCYTES 2.5 0.8 - 3.5 K/UL    ABS. MONOCYTES 0.8 0.0 - 1.0 K/UL    ABS. EOSINOPHILS 0.3 0.0 - 0.4 K/UL    ABS. BASOPHILS 0.1 0.0 - 0.1 K/UL    ABS. IMM.  GRANS. 0.1 (H) 0.00 - 0.04 K/UL    DF AUTOMATED     METABOLIC PANEL, BASIC    Collection Time: 03/13/18  4:11 PM   Result Value Ref Range    Sodium 141 136 - 145 mmol/L    Potassium 4.9 3.5 - 5.1 mmol/L    Chloride 105 97 - 108 mmol/L    CO2 28 21 - 32 mmol/L    Anion gap 8 5 - 15 mmol/L    Glucose 109 (H) 65 - 100 mg/dL    BUN 15 6 - 20 MG/DL    Creatinine 0.72 0.70 - 1.30 MG/DL    BUN/Creatinine ratio 21 (H) 12 - 20      GFR est AA >60 >60 ml/min/1.73m2    GFR est non-AA >60 >60 ml/min/1.73m2    Calcium 8.9 8.5 - 10.1 MG/DL   LACTIC ACID    Collection Time: 03/13/18  4:11 PM   Result Value Ref Range    Lactic acid 0.8 0.4 - 2.0 MMOL/L   SED RATE (ESR)    Collection Time: 03/13/18  6:18 PM   Result Value Ref Range    Sed rate, automated 6 0 - 20 mm/hr         Impression:     Patient Active Problem List    Diagnosis Date Noted    Left foot infection 03/13/2018    Bladder tumor 03/06/2018    FH: bladder cancer 03/06/2018    Long term current use of methadone for pain control 12/27/2017    Major depressive disorder with current active episode 12/27/2017    Physical debility 12/27/2017    HTN (hypertension) 12/21/2017    Bilateral cellulitis of lower leg 12/21/2017    Drug overdose 10/09/2016    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 No            Allergies that may influence a procedure:       no            Medications that may influence a procedure: No            PMSH that may influence a procedure:       no            Diagnostic Tests:      EMG/NCS: not done       MRI: Images independently viewed, agree with report       X-ray: Images independently viewed, agree with report             Prior treatments:      Physical Therapy: Yes      Medications tried: opioids      Outside Records: All outside records that may have been scanned into [facility]       have been reviewed and discussed with the patient.       Procedures: none            This patient seen in consultation referred by Dr. Penny Barger For neck pain       RECOMMENDATIONS:      - Oxycodone       - Oxycontin      - Discharge from practice            - High risk of cervical interventions discussed (spinal cord injury,       paralysis, seizure, stroke, death)            If fails to progress,      - spine surgery consultation for open surgical procedure      - pain medicine consultation if non-operative care chosen            Key diagnostic test images:      reviewed      Left foot infection (3/13/2018)        Plan:     Left foot pain, infection, concern for osteomyelitis   MRI of the left foot pending   WBAT with assistance (walker for safety)  Cast shoe    Medical service has ordered heparin, Vanco and zosyn  Chronic pain - on methadone- palliative care has been consulted       Dr. Jared Holman aware and agrees with plan as above. Trace Zapata PA-C  [facility]    Agree with HPI, Past medical, surgical history, medications, allergies, social history. ROS: 10 point review of systems positive for LLE pain, otherwise negative    I personally interviewed and examined the patient. Mild erythema at foot, leg and distal thigh. Significant Tenderness to light touch of skin diffusely at the leg up to the distal thigh. No crepitus. Superficial ulceration as depicted above at the foot or in the toe web spaces. No drainage    No knee effusion. No ankle effusion. A/P: Chronic left foot ulceration, treated unsuccessfully by Renetta William DPM in wound care clinic  - pain control - chronic opiod use; on methadone  - IV antibiotics  - Await MRI to evaluate for deep infection/osteo  - X-ray imaging with questionable finding at great toe, but clinically this is not the focal point.  X-ray findings likely incidental  - patient nonseptic; no signs of abscess  - will monitor for resolution

## 2018-03-14 NOTE — PROGRESS NOTES
Confirmed patient is to be kept NPO after midnight for procedure tomorrow. NPO order in [facility].

## 2018-03-14 NOTE — PROCEDURES
[facility]  *** FINAL REPORT ***    Name: Kala Malave  MRN: [id number]    Inpatient  : 21 Dec 1965  HIS Order #: [id number]  [id number] [facility] Visit #: [id number]  Date: 13 Mar 2018    TYPE OF TEST: Peripheral Arterial Testing    REASON FOR TEST  Decreased blood flow left LE    Right Leg  Doppler:  PVR:        Normal  Ankle/Brachial: 1.42    Left Leg  Doppler:  PVR:        Normal  Ankle/Brachial: 1.19    INTERPRETATION/FINDINGS  PROCEDURE:  Ankle, brachial and digital arterial pressures, CW Doppler   waveforms and digital PPG waveforms were performed. 1. No evidence of significant peripheral arterial disease at rest in  the right leg. 2. No evidence of significant peripheral arterial disease at rest in  the left leg. 3. The right ankle/brachial index is 1.42 and the left ankle/brachial  index is 1.19.  4. The right great toe/brachial index is 1.38 and the left great  toe/brachial index is 1.02.    ADDITIONAL COMMENTS    I have personally reviewed the data relevant to the interpretation of  this  study.     TECHNOLOGIST: Rosa New RVT  Signed: 2018 08:55 AM    PHYSICIAN: Shannan Ramirez MD  Signed: 03/15/2018 03:21 PM

## 2018-03-14 NOTE — PROGRESS NOTES
MRI note    Several attempts made to call the nurse for this pt. More information needed for aneurysm history noted in the pts chart    Has he had surgery and if he has clips for the aneurysm?   Please call the dept when this information is obtained

## 2018-03-14 NOTE — H&P
Hospitalist Admission Note    NAME: Jack Norwood   :  1965   MRN:  [id number]     Date/Time:  3/13/2018 9:43 PM    Patient PCP: Agnieszka Lawton MD  ______________________________________________________________________  Given the patient's current clinical presentation, I have a high level of concern for decompensation if discharged from the emergency department. Complex decision making was performed, which includes reviewing the patient's available past medical records, laboratory results, and x-ray films. My assessment of this patient's clinical condition and my plan of care is as follows. Assessment / Plan:    Left foot and leg cellulitis with chronic draining wound and possible underlying osteomyelitis  -patient's x-ray is suggestive of a focal area of osteopenia concerning for osteo, however a sed rate is only 6.   -Orthopedics has been consulted. -Will need cultures of the wound and possibly the bone. May need an MRI of the foot but will await evaluation by orthopedics who is in there currently to see him. He had an MRI in December that did not show evidence of osteo-myelitis at that time.  -Patient received Vancocin vancomycin and Zosyn, which I will continue for now should cover the Pseudomonas and other organisms that he has had in the past will need to follow-up cultures to narrow  -He may need a PICC line if he is found to have osteomyelitis and the Pseudomonas is resistant to fluoroquinolones so he may need IV therapy even for soft tissue infection  -Consider ID consultation will hold off for now  -We will ask wound care to see in consultation  -Patient had palpable pulses on exam but may need further evaluation by vascular surgery and will defer this to orthopedics.     Chronic pain on methadone  -We will treat him supportively for his pain and add oral and IV Dilaudid for his acute pain.   -Will ask pharmacy and palliative care to assist with dosing his methadone if that is possible while in-house. Hypertension we will continue his lisinopril and adjust his medicines as needed while in house    Hyperlipidemia I do not see that he is on anything for this at this time    History of gout-we will continue his allopurinol    Hypothyroidism-we will continue Synthroid and check a TSH since his levels have been high in the past    Bladder mass-he was diagnosed with this as an outpatient as he has follow-up next week    Tobacco use-we will encourage smoking cessation, using nicotine patch. he does have some rhonchi on exam but denies any respiratory symptoms at this time, will write for some nebulizers and follow supportively for now no exacerbation but I suspect he has underlying COPD    CODE STATUS patient thinks he has a living will he would want his sister wanted to make decisions for him phone #[phone] currently would like to be a full code    DVT prophylaxis patient will be on heparin subcu and will mobilize as tolerated with physical therapy once cleared by orthopedic surgery. GI Prophylaxis: not indicated    Baseline: independent, lives alone      Subjective:   CHIEF COMPLAINT: Increasing left foot pain and drainage    HISTORY OF PRESENT ILLNESS:     Aniceto Gannon is a 47 y.o. obese man with a history of chronic pain on methadone, hypertension, hyperlipidemia, and gout who was hospitalized in December through mid January for a left foot cellulitis and wound that required debridement and multiple consultations. He grew multiple organisms including Pseudomonas enterococcus group B strep and anaerobes at that time. Patient has been home with home health until about 2 weeks ago. He has been following with Dr. Arturo Gamboa who he saw about 2 weeks ago and was given a 10 day course of antibiotics. he cannot say what kind he finished those.  In the past week, the pain in his left foot has been getting increasingly unbearable despite taking his daily methadone at the clinic, as well as daily Advil, and his Lyrica. He ran out of his Lyrica about 2 days ago and since home health stopped coming, a friend has been helping changes dressings. Patient's pain has been increasingly worse and he has had yellow drainage staining the dressing and presented to the ER for further evaluation and management. Patient still complains of pain said he did get his methadone this morning. He has had occasional chills but denies fever and reports that his appetite has been okay he did have some pain earlier in his knees. Denies any chest pain or shortness of breath or cough he is a chronic smoker but does not have nebulizers at home. In the ER patient had a white count of 11.8, sed rate of 6, lactic acid was normal and x-ray showed focal osteopenia concerning for possible osteomyelitis. He received Zosyn and vancomycin and we were asked to admit for further evaluation and management. Orthopedics agreed to see him in consultation since there is no podiatry available. Review of systems complete review of systems was done except as noted above in H&P was negative    We were asked to admit for work up and evaluation of the above problems.      Past Medical History:   Diagnosis Date    Aneurysm ([address]     (with stroke)    Gout     Hypercholesterolemia     Hypertension     Lesion of bladder     Seizures ([address]     Stroke ([facility] 2006    left sided weakness    Thromboembolus ([facility]     Thyroid disease     TIA (transient ischemic attack) 2012        Past Surgical History:   Procedure Laterality Date    HX ORTHOPAEDIC      KNEE ARTHROSCP HARV      left knee       Social History   Substance Use Topics    Smoking status: Current Every Day Smoker     Packs/day: 1.00    Smokeless tobacco: Never Used    Alcohol use 8.4 oz/week     14 Cans of beer per week        Family History   Problem Relation Age of Onset    Diabetes Father     Diabetes Sister     Diabetes Brother    Social history patient lives alone he is  had home health coming in for wound care until about 2 weeks ago apparently some other agents he was supposed to come instead. He is a long-term smoker admits to occasional beers on weekends no drug use    Family history his mother  of skin cancer his father  with complications of diabetes  Allergies   Allergen Reactions    Sulfamethoxazole-Trimethoprim Rash     L eye and L hand    Ciprofloxacin Hives     Per pcp records    Codeine Hives     Tolerates dilaudid, oxycodone    Lyrica [Pregabalin] Hives    Sulfa (Sulfonamide Antibiotics) Rash    Ultram [Tramadol] Hives        Prior to Admission medications    Medication Sig Start Date End Date Taking? Authorizing Provider   aspirin delayed-release 81 mg tablet Take 81 mg by mouth daily. Yes Historical Provider   lidocaine-prilocaine (EMLA) topical cream Apply  to affected area three (3) times daily as needed for Pain. Patient applies a 'generous amount' to wound on top of left foot. Yes Historical Provider   pregabalin (LYRICA) 75 mg capsule Take 150 mg by mouth two (2) times a day. Yes Historical Provider   ibuprofen (ADVIL) 200 mg tablet Take 1,200 mg by mouth daily as needed for Pain. Yes Historical Provider   METHADONE HCL (METHADONE PO) Take 95 mg by mouth daily. Patient takes liquid methadone. Yes Historical Provider   colchicine (COLCRYS) 0.6 mg tablet Take 1 Tab by mouth daily as needed. For gout flare 18  Yes Rashad Onofre MD   allopurinol (ZYLOPRIM) 100 mg tablet Take 1 Tab by mouth daily. 18  Yes Rashad Onofre MD   levothyroxine (SYNTHROID) 75 mcg tablet Take 1 Tab by mouth Daily (before breakfast). 18  Yes Rashad Onofre MD   lisinopril (PRINIVIL, ZESTRIL) 20 mg tablet Take 1 Tab by mouth daily. 18  Yes Rashad Onofre MD       REVIEW OF SYSTEMS:   See above  I am not able to complete the review of systems because:    The patient is intubated and sedated    The patient has altered mental status due to his acute medical problems    The patient has baseline aphasia from prior stroke(s)    The patient has baseline dementia and is not reliable historian    The patient is in acute medical distress and unable to provide information               Objective:   VITALS:    Visit Vitals    /64    Pulse 67    Temp 98.2 °F (36.8 °C)    Resp 16    Ht 6' 1\" (1.854 m)    Wt 100.4 kg (221 lb 5.5 oz)    SpO2 96%    BMI 29.2 kg/m2       PHYSICAL EXAM:    Patient is awake and alert nontoxic appearing on room air no distress extraocular movements are intact sclera nonicteric on September pink oropharynx is tacky without thrush or lesions neck is supple nontender no lymphadenopathy no JVD no carotid bruits or thyromegaly nodules appreciated cardiovascular regular rate no murmurs rubs or gallops lungs decreased breath sounds in the bases some scattered rhonchi moderate air movement no crackles or wheezing abdomen is obese bowel sounds present a little firm to palpation and seems to be tensing his abdomen but denies any tenderness no hepatosplenomegaly secretion of bladder distention or tenderness extremities his bilateral edema in his shins and feet worse on the left no clubbing or cyanosis I was able to palpate pedal pulses bilaterally. His right shin has some mild erythema with some excoriations slight scabbing from prior scratch marks the left foot and leg are more erythematous and warm and tender to touch. On the dorsum of his left foot he has a mercury demarcated area of a dark erythema almost like bleeding under the skin which she says has been getting worse since he came to the ER there appears to be some necrotic tissue around the area but is not sloughed off and there is some drainage around it.  Neuro exam he has contracted left upper extremity and slightly more weakness on the left leg than on the right this is his baseline from prior stroke    _______________________________________________________________________  Care Plan discussed with:    Comments   Patient y    Family      RN y    Care Manager                    Consultant:  [facility]   _______________________________________________________________________  Expected  Disposition:   Home with Family    HH/PT/OT/RN    SNF/LTC    LORAINE    ________________________________________________________________________  TOTAL TIME: Minutes    Critical Care Provided     Minutes non procedure based      Comments     Reviewed previous records   >50% of visit spent in counseling and coordination of care  Discussion with patient and/or family and questions answered       ________________________________________________________________________  Signed: Romana Glass, MD    Procedures: see electronic medical records for all procedures/Xrays and details which were not copied into this note but were reviewed prior to creation of Plan. LAB DATA REVIEWED:    Recent Results (from the past 24 hour(s))   CBC WITH AUTOMATED DIFF    Collection Time: 03/13/18  4:11 PM   Result Value Ref Range    WBC 11.8 (H) 4.1 - 11.1 K/uL    RBC 4.48 4.10 - 5.70 M/uL    HGB 13.0 12.1 - 17.0 g/dL    HCT 39.6 36.6 - 50.3 %    MCV 88.4 80.0 - 99.0 FL    MCH 29.0 26.0 - 34.0 PG    MCHC 32.8 30.0 - 36.5 g/dL    RDW 17.1 (H) 11.5 - 14.5 %    PLATELET 353 133 - 108 K/uL    MPV 10.7 8.9 - 12.9 FL    NRBC 0.0 0  WBC    ABSOLUTE NRBC 0.00 0.00 - 0.01 K/uL    NEUTROPHILS 68 32 - 75 %    LYMPHOCYTES 21 12 - 49 %    MONOCYTES 7 5 - 13 %    EOSINOPHILS 3 0 - 7 %    BASOPHILS 0 0 - 1 %    IMMATURE GRANULOCYTES 1 (H) 0.0 - 0.5 %    ABS. NEUTROPHILS 8.0 1.8 - 8.0 K/UL    ABS. LYMPHOCYTES 2.5 0.8 - 3.5 K/UL    ABS. MONOCYTES 0.8 0.0 - 1.0 K/UL    ABS. EOSINOPHILS 0.3 0.0 - 0.4 K/UL    ABS. BASOPHILS 0.1 0.0 - 0.1 K/UL    ABS. IMM.  GRANS. 0.1 (H) 0.00 - 0.04 K/UL    DF AUTOMATED     METABOLIC PANEL, BASIC    Collection Time: 03/13/18  4:11 PM Result Value Ref Range    Sodium 141 136 - 145 mmol/L    Potassium 4.9 3.5 - 5.1 mmol/L    Chloride 105 97 - 108 mmol/L    CO2 28 21 - 32 mmol/L    Anion gap 8 5 - 15 mmol/L    Glucose 109 (H) 65 - 100 mg/dL    BUN 15 6 - 20 MG/DL    Creatinine 0.72 0.70 - 1.30 MG/DL    BUN/Creatinine ratio 21 (H) 12 - 20      GFR est AA >60 >60 ml/min/1.73m2    GFR est non-AA >60 >60 ml/min/1.73m2    Calcium 8.9 8.5 - 10.1 MG/DL   LACTIC ACID    Collection Time: 03/13/18  4:11 PM   Result Value Ref Range    Lactic acid 0.8 0.4 - 2.0 MMOL/L   SED RATE (ESR)    Collection Time: 03/13/18  6:18 PM   Result Value Ref Range    Sed rate, automated 6 0 - 20 mm/hr

## 2018-03-14 NOTE — PROGRESS NOTES
Patient refused MRI. Attempted, put patient into scanner, he rang alarm bell to get out of scanner due to claustrophobia. Spoke to nurse Love Ko, patient repeteadly saying he needs to be \"knocked out\" despite only lower part of body being in scanner.  Nurse will consult with MD.

## 2018-03-14 NOTE — PROGRESS NOTES
ADULT PROTOCOL: JET AEROSOL  REASSESSMENT    Patient  Becka Bermudez     47 y.o.   male     3/14/2018  10:22 AM    Breath Sounds Pre Procedure: Diminished                                   Breath Sounds Post Procedure: Diminished                                     Breathing pattern: Pre procedure Regular            Oxygen: O2 Device: Room air        Changed: No    SpO2: 98% on Room Air                  Nebulizer Therapy: Current medications Duoneb 2.5mg QID      Changed: Yes changed to PRN    Smoking History: Current Everyday Smaker    Problem List:   Patient Active Problem List   Diagnosis Code    Stroke ([facility] [address] I63.9    Hypertension I10    Thromboembolism (HCC) I74.9    Cerebral hemorrhage with hemiparesis (HCC) I62.9, G81.90    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    Cerebral infarction ([facility] [address] I63.9    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

## 2018-03-15 ENCOUNTER — ANESTHESIA EVENT (OUTPATIENT)
Dept: MRI IMAGING | Age: 53
DRG: 380 | End: 2018-03-15
Payer: MEDICAID

## 2018-03-15 ENCOUNTER — APPOINTMENT (OUTPATIENT)
Dept: MRI IMAGING | Age: 53
DRG: 380 | End: 2018-03-15
Attending: PHYSICIAN ASSISTANT
Payer: MEDICAID

## 2018-03-15 ENCOUNTER — TELEPHONE (OUTPATIENT)
Dept: FAMILY MEDICINE CLINIC | Age: 53
End: 2018-03-15

## 2018-03-15 ENCOUNTER — ANESTHESIA (OUTPATIENT)
Dept: MRI IMAGING | Age: 53
DRG: 380 | End: 2018-03-15
Payer: MEDICAID

## 2018-03-15 LAB
ANION GAP SERPL CALC-SCNC: 5 MMOL/L (ref 5–15)
BUN SERPL-MCNC: 12 MG/DL (ref 6–20)
BUN/CREAT SERPL: 18 (ref 12–20)
CALCIUM SERPL-MCNC: 8.5 MG/DL (ref 8.5–10.1)
CHLORIDE SERPL-SCNC: 104 MMOL/L (ref 97–108)
CO2 SERPL-SCNC: 30 MMOL/L (ref 21–32)
CREAT SERPL-MCNC: 0.65 MG/DL (ref 0.7–1.3)
DATE LAST DOSE: ABNORMAL
ERYTHROCYTE [DISTWIDTH] IN BLOOD BY AUTOMATED COUNT: 16.7 % (ref 11.5–14.5)
GLUCOSE SERPL-MCNC: 113 MG/DL (ref 65–100)
HCT VFR BLD AUTO: 37 % (ref 36.6–50.3)
HGB BLD-MCNC: 11.8 G/DL (ref 12.1–17)
MCH RBC QN AUTO: 28.4 PG (ref 26–34)
MCHC RBC AUTO-ENTMCNC: 31.9 G/DL (ref 30–36.5)
MCV RBC AUTO: 88.9 FL (ref 80–99)
NRBC # BLD: 0 K/UL (ref 0–0.01)
NRBC BLD-RTO: 0 PER 100 WBC
PLATELET # BLD AUTO: 174 K/UL (ref 150–400)
PMV BLD AUTO: 10.9 FL (ref 8.9–12.9)
POTASSIUM SERPL-SCNC: 4 MMOL/L (ref 3.5–5.1)
RBC # BLD AUTO: 4.16 M/UL (ref 4.1–5.7)
REPORTED DOSE,DOSE: ABNORMAL UNITS
REPORTED DOSE/TIME,TMG: 2200
SODIUM SERPL-SCNC: 139 MMOL/L (ref 136–145)
VANCOMYCIN TROUGH SERPL-MCNC: 17.4 UG/ML (ref 5–10)
WBC # BLD AUTO: 7.6 K/UL (ref 4.1–11.1)

## 2018-03-15 PROCEDURE — 74011250636 HC RX REV CODE- 250/636: Performed by: EMERGENCY MEDICINE

## 2018-03-15 PROCEDURE — 80202 ASSAY OF VANCOMYCIN: CPT | Performed by: INTERNAL MEDICINE

## 2018-03-15 PROCEDURE — 74011250636 HC RX REV CODE- 250/636

## 2018-03-15 PROCEDURE — 36415 COLL VENOUS BLD VENIPUNCTURE: CPT | Performed by: INTERNAL MEDICINE

## 2018-03-15 PROCEDURE — 74011250636 HC RX REV CODE- 250/636: Performed by: INTERNAL MEDICINE

## 2018-03-15 PROCEDURE — 74011250637 HC RX REV CODE- 250/637: Performed by: INTERNAL MEDICINE

## 2018-03-15 PROCEDURE — 80048 BASIC METABOLIC PNL TOTAL CA: CPT | Performed by: EMERGENCY MEDICINE

## 2018-03-15 PROCEDURE — 93005 ELECTROCARDIOGRAM TRACING: CPT

## 2018-03-15 PROCEDURE — 85027 COMPLETE CBC AUTOMATED: CPT | Performed by: INTERNAL MEDICINE

## 2018-03-15 PROCEDURE — 87205 SMEAR GRAM STAIN: CPT | Performed by: INTERNAL MEDICINE

## 2018-03-15 PROCEDURE — 76210000006 HC OR PH I REC 0.5 TO 1 HR

## 2018-03-15 PROCEDURE — 74011250637 HC RX REV CODE- 250/637: Performed by: EMERGENCY MEDICINE

## 2018-03-15 PROCEDURE — 76060000034 HC ANESTHESIA 1.5 TO 2 HR

## 2018-03-15 PROCEDURE — 65660000000 HC RM CCU STEPDOWN

## 2018-03-15 PROCEDURE — 73720 MRI LWR EXTREMITY W/O&W/DYE: CPT

## 2018-03-15 PROCEDURE — 74011250636 HC RX REV CODE- 250/636: Performed by: ANESTHESIOLOGY

## 2018-03-15 PROCEDURE — A9576 INJ PROHANCE MULTIPACK: HCPCS | Performed by: INTERNAL MEDICINE

## 2018-03-15 RX ORDER — AMOXICILLIN AND CLAVULANATE POTASSIUM 875; 125 MG/1; MG/1
1 TABLET, FILM COATED ORAL EVERY 12 HOURS
Status: DISCONTINUED | OUTPATIENT
Start: 2018-03-15 | End: 2018-03-16

## 2018-03-15 RX ORDER — SODIUM CHLORIDE 9 MG/ML
25 INJECTION, SOLUTION INTRAVENOUS CONTINUOUS
Status: DISCONTINUED | OUTPATIENT
Start: 2018-03-15 | End: 2018-03-15 | Stop reason: HOSPADM

## 2018-03-15 RX ORDER — FENTANYL CITRATE 50 UG/ML
50 INJECTION, SOLUTION INTRAMUSCULAR; INTRAVENOUS AS NEEDED
Status: DISCONTINUED | OUTPATIENT
Start: 2018-03-15 | End: 2018-03-15 | Stop reason: HOSPADM

## 2018-03-15 RX ORDER — LIDOCAINE HYDROCHLORIDE 10 MG/ML
0.1 INJECTION, SOLUTION EPIDURAL; INFILTRATION; INTRACAUDAL; PERINEURAL AS NEEDED
Status: DISCONTINUED | OUTPATIENT
Start: 2018-03-15 | End: 2018-03-15 | Stop reason: HOSPADM

## 2018-03-15 RX ORDER — MORPHINE SULFATE 10 MG/ML
2 INJECTION, SOLUTION INTRAMUSCULAR; INTRAVENOUS
Status: DISCONTINUED | OUTPATIENT
Start: 2018-03-15 | End: 2018-03-15 | Stop reason: HOSPADM

## 2018-03-15 RX ORDER — SODIUM CHLORIDE, SODIUM LACTATE, POTASSIUM CHLORIDE, CALCIUM CHLORIDE 600; 310; 30; 20 MG/100ML; MG/100ML; MG/100ML; MG/100ML
100 INJECTION, SOLUTION INTRAVENOUS CONTINUOUS
Status: DISCONTINUED | OUTPATIENT
Start: 2018-03-15 | End: 2018-03-15 | Stop reason: HOSPADM

## 2018-03-15 RX ORDER — FENTANYL CITRATE 50 UG/ML
25 INJECTION, SOLUTION INTRAMUSCULAR; INTRAVENOUS
Status: DISCONTINUED | OUTPATIENT
Start: 2018-03-15 | End: 2018-03-15 | Stop reason: HOSPADM

## 2018-03-15 RX ORDER — SODIUM CHLORIDE 0.9 % (FLUSH) 0.9 %
5-10 SYRINGE (ML) INJECTION AS NEEDED
Status: DISCONTINUED | OUTPATIENT
Start: 2018-03-15 | End: 2018-03-15 | Stop reason: HOSPADM

## 2018-03-15 RX ORDER — FENTANYL CITRATE 50 UG/ML
INJECTION, SOLUTION INTRAMUSCULAR; INTRAVENOUS
Status: COMPLETED
Start: 2018-03-15 | End: 2018-03-15

## 2018-03-15 RX ORDER — MIDAZOLAM HYDROCHLORIDE 1 MG/ML
1 INJECTION, SOLUTION INTRAMUSCULAR; INTRAVENOUS AS NEEDED
Status: DISCONTINUED | OUTPATIENT
Start: 2018-03-15 | End: 2018-03-15 | Stop reason: HOSPADM

## 2018-03-15 RX ORDER — LIDOCAINE HYDROCHLORIDE 10 MG/ML
0.1 INJECTION, SOLUTION EPIDURAL; INFILTRATION; INTRACAUDAL; PERINEURAL AS NEEDED
Status: DISCONTINUED | OUTPATIENT
Start: 2018-03-15 | End: 2018-03-15

## 2018-03-15 RX ORDER — SODIUM CHLORIDE 0.9 % (FLUSH) 0.9 %
5-10 SYRINGE (ML) INJECTION EVERY 8 HOURS
Status: DISCONTINUED | OUTPATIENT
Start: 2018-03-15 | End: 2018-03-15 | Stop reason: HOSPADM

## 2018-03-15 RX ORDER — MIDAZOLAM HYDROCHLORIDE 1 MG/ML
0.5 INJECTION, SOLUTION INTRAMUSCULAR; INTRAVENOUS
Status: DISCONTINUED | OUTPATIENT
Start: 2018-03-15 | End: 2018-03-15 | Stop reason: HOSPADM

## 2018-03-15 RX ORDER — ROPIVACAINE HYDROCHLORIDE 5 MG/ML
30 INJECTION, SOLUTION EPIDURAL; INFILTRATION; PERINEURAL AS NEEDED
Status: DISCONTINUED | OUTPATIENT
Start: 2018-03-15 | End: 2018-03-15 | Stop reason: HOSPADM

## 2018-03-15 RX ORDER — DIPHENHYDRAMINE HYDROCHLORIDE 50 MG/ML
12.5 INJECTION, SOLUTION INTRAMUSCULAR; INTRAVENOUS AS NEEDED
Status: DISCONTINUED | OUTPATIENT
Start: 2018-03-15 | End: 2018-03-15 | Stop reason: HOSPADM

## 2018-03-15 RX ORDER — ONDANSETRON 2 MG/ML
4 INJECTION INTRAMUSCULAR; INTRAVENOUS AS NEEDED
Status: DISCONTINUED | OUTPATIENT
Start: 2018-03-15 | End: 2018-03-15 | Stop reason: HOSPADM

## 2018-03-15 RX ORDER — HYDROMORPHONE HYDROCHLORIDE 1 MG/ML
0.5 INJECTION, SOLUTION INTRAMUSCULAR; INTRAVENOUS; SUBCUTANEOUS
Status: DISCONTINUED | OUTPATIENT
Start: 2018-03-15 | End: 2018-03-15 | Stop reason: HOSPADM

## 2018-03-15 RX ADMIN — HEPARIN SODIUM 5000 UNITS: 5000 INJECTION, SOLUTION INTRAVENOUS; SUBCUTANEOUS at 22:39

## 2018-03-15 RX ADMIN — HYDROMORPHONE HYDROCHLORIDE 1 MG: 2 INJECTION, SOLUTION INTRAMUSCULAR; INTRAVENOUS; SUBCUTANEOUS at 15:24

## 2018-03-15 RX ADMIN — Medication 5 ML: at 13:43

## 2018-03-15 RX ADMIN — LISINOPRIL 20 MG: 20 TABLET ORAL at 12:53

## 2018-03-15 RX ADMIN — HYDROMORPHONE HYDROCHLORIDE 2 MG: 2 TABLET ORAL at 05:50

## 2018-03-15 RX ADMIN — PIPERACILLIN SODIUM AND TAZOBACTAM SODIUM 3.38 G: .375; 3 INJECTION, POWDER, LYOPHILIZED, FOR SOLUTION INTRAVENOUS at 05:42

## 2018-03-15 RX ADMIN — HYDROMORPHONE HYDROCHLORIDE 1 MG: 2 INJECTION, SOLUTION INTRAMUSCULAR; INTRAVENOUS; SUBCUTANEOUS at 18:49

## 2018-03-15 RX ADMIN — Medication 10 ML: at 05:43

## 2018-03-15 RX ADMIN — GADOTERIDOL 20 ML: 279.3 INJECTION, SOLUTION INTRAVENOUS at 11:03

## 2018-03-15 RX ADMIN — PREGABALIN 150 MG: 150 CAPSULE ORAL at 12:53

## 2018-03-15 RX ADMIN — FENTANYL CITRATE 25 MCG: 50 INJECTION, SOLUTION INTRAMUSCULAR; INTRAVENOUS at 11:16

## 2018-03-15 RX ADMIN — Medication 10 ML: at 05:42

## 2018-03-15 RX ADMIN — PREGABALIN 150 MG: 150 CAPSULE ORAL at 18:49

## 2018-03-15 RX ADMIN — LEVOTHYROXINE SODIUM 75 MCG: 25 TABLET ORAL at 05:50

## 2018-03-15 RX ADMIN — VANCOMYCIN HYDROCHLORIDE 1250 MG: 10 INJECTION, POWDER, LYOPHILIZED, FOR SOLUTION INTRAVENOUS at 06:31

## 2018-03-15 RX ADMIN — ASPIRIN 81 MG: 81 TABLET, COATED ORAL at 12:53

## 2018-03-15 RX ADMIN — ALLOPURINOL 100 MG: 100 TABLET ORAL at 12:53

## 2018-03-15 RX ADMIN — AMOXICILLIN AND CLAVULANATE POTASSIUM 1 TABLET: 875; 125 TABLET, FILM COATED ORAL at 18:49

## 2018-03-15 RX ADMIN — METHADONE HYDROCHLORIDE 100 MG: 10 CONCENTRATE ORAL at 12:53

## 2018-03-15 RX ADMIN — HYDROMORPHONE HYDROCHLORIDE 2 MG: 2 TABLET ORAL at 02:16

## 2018-03-15 RX ADMIN — PIPERACILLIN SODIUM AND TAZOBACTAM SODIUM 3.38 G: .375; 3 INJECTION, POWDER, LYOPHILIZED, FOR SOLUTION INTRAVENOUS at 12:55

## 2018-03-15 RX ADMIN — HYDROMORPHONE HYDROCHLORIDE 2 MG: 2 TABLET ORAL at 22:40

## 2018-03-15 RX ADMIN — FENTANYL CITRATE 25 MCG: 50 INJECTION, SOLUTION INTRAMUSCULAR; INTRAVENOUS at 11:24

## 2018-03-15 RX ADMIN — HEPARIN SODIUM 5000 UNITS: 5000 INJECTION, SOLUTION INTRAVENOUS; SUBCUTANEOUS at 12:53

## 2018-03-15 RX ADMIN — Medication 10 ML: at 22:42

## 2018-03-15 NOTE — PROGRESS NOTES
Spoke with Dr. Harpal Lepe via telephone to request PACU orders & to notify patient HR = assymtomatic 40-45 with stable BP. No new orders for HR. Genny Villalobos

## 2018-03-15 NOTE — ANESTHESIA PREPROCEDURE EVALUATION
Anesthetic History   No history of anesthetic complications            Review of Systems / Medical History  Patient summary reviewed, nursing notes reviewed and pertinent labs reviewed    Pulmonary  Within defined limits                 Neuro/Psych     seizures  CVA  TIA and psychiatric history     Cardiovascular  Within defined limits  Hypertension              Exercise tolerance: >4 METS     GI/Hepatic/Renal  Within defined limits              Endo/Other      Hypothyroidism       Other Findings              Physical Exam    Airway  Mallampati: III  TM Distance: 4 - 6 cm  Neck ROM: normal range of motion   Mouth opening: Normal     Cardiovascular  Regular rate and rhythm,  S1 and S2 normal,  no murmur, click, rub, or gallop             Dental  No notable dental hx       Pulmonary  Breath sounds clear to auscultation               Abdominal  GI exam deferred       Other Findings            Anesthetic Plan    ASA: 3  Anesthesia type: MAC          Induction: Intravenous  Anesthetic plan and risks discussed with: Patient

## 2018-03-15 NOTE — PROGRESS NOTES
Hospitalist Progress Note    NAME: Alec Silva   :  1965   MRN:  [id number]   LOS:   2      Assessment / Plan:  Acute left foot cellulitis, chronic wound, ruled out osteomyelitis  -appreciate ortho evaluation  -MRI done no evidence of acute osteo, no subcutaneous edema  -not sure if patient needs debridement of wound  -will de escalate antibiotics to oral, no cultures  -follow blood cultures so far negative  -EUGENE negative for PAD has good circulation and had vascular surgery evaluation on last admission, hba1c normal    Chronic pain on methadone  -called patient's methadone clinic [facility], confirmed patient's daily dose of methadone is 100 mg   -ordered patient's home dose daily discussed with pharmacy  -dilaudid PRN breakthrough pain    Hypertension  -lisinopril    HLD  -not on medication    Gout  -allopurinol    Hypothyroidism  -synthroid, TSH mildly elevated T4 normal    Bladder mass  -plan for outpatient follow up    Tobacco use  -nicotine patch prn    Code status: Full  Prophylaxis: Hep SQ  Recommended Disposition: pending hospital course and PT eval     Subjective:     Chief Complaint: foot pain  Tolerated MRI with sedation today  patuient continues to have pain, nervous about if he needs surgery    Objective:     VITALS:   Last 24hrs VS reviewed since prior progress note.  Most recent are:  Patient Vitals for the past 24 hrs:   Temp Pulse Resp BP SpO2   03/15/18 1143 96 °F (35.6 °C) (!) 50 14 147/76 98 %   03/15/18 1134 - (!) 50 15 - 98 %   03/15/18 1130 97.7 °F (36.5 °C) (!) 41 10 149/73 99 %   03/15/18 1115 - (!) 45 10 135/80 100 %   03/15/18 1100 - (!) 44 8 137/77 100 %   03/15/18 1055 97.5 °F (36.4 °C) (!) 46 8 151/71 100 %   03/15/18 1050 - (!) 47 9 151/81 100 %   03/15/18 1045 - (!) 47 (!) 7 147/71 100 %   03/15/18 1044 - (!) 48 8 137/73 100 %   03/15/18 0330 98.5 °F (36.9 °C) (!) 54 18 153/89 95 %       Intake/Output Summary (Last 24 hours) at 03/15/18 1866  Last data filed at 03/15/18 1130   Gross per 24 hour   Intake              850 ml   Output             2075 ml   Net            -1225 ml        PHYSICAL EXAM:  General: Alert, cooperative, no acute distress    EENT:  EOMI. Anicteric sclerae. Mucous membranes moist  Resp:  CTA bilaterally, no wheezing or rales. No accessory muscle use  CV:  Regular rhythm, no edema  GI:  Soft, non distended, non tender. +Bowel sounds  Neurologic:  Alert and oriented X 3, normal speech  Psych:   Good insight, not anxious nor agitated  Skin:  Large dark ulcer over left dorsum of foot, no obvious drainage  ________________________________________________________________________  Care Plan discussed with:    Comments   Patient x    Family      RN x    Care Manager     Consultant                        Multidiciplinary team rounds were held today with , nursing, pharmacist and clinical coordinator. Patient's plan of care was discussed; medications were reviewed and discharge planning was addressed. ________________________________________________________________________  Total NON critical care TIME:  30   Minutes    >50% of visit spent in counseling and coordination of care       ________________________________________________________________________    Procedures: see electronic medical records for all procedures/Xrays and details which were not copied into this note but were reviewed prior to creation of Plan. LABS:  I reviewed today's most current labs and imaging studies.   Pertinent labs include:  Recent Labs      03/15/18   0535  03/14/18   0600  03/13/18   1611   WBC  7.6  9.1  11.8*   HGB  11.8*  12.0*  13.0   HCT  37.0  37.4  39.6   PLT  174  180  238     Recent Labs      03/15/18   0535  03/14/18   0600  03/13/18   1611   NA  139  141  141   K  4.0  4.2  4.9   CL  104  107  105   CO2  30  27  28   GLU  113*  102*  109*   BUN  12  14  15   CREA  0.65*  0.58*  0.72   CA  8.5  8.6  8.9   MG   --   2.1   --    PHOS   --   4.4 --        Signed: Cesar Hale MD

## 2018-03-15 NOTE — PROGRESS NOTES
Pharmacy Automatic Renal Dosing Protocol - Antimicrobials    Indication for Antimicrobials: Diabetic Foot Infection with likely underlying osteomyelitis- 3/13 x ray shows osteomyelitis. Refused  MRI last night due to claustrophobia    Current Regimen of Each Antimicrobial:  Vancomycin  2500 mg x 1 then 1250 mg Q8H (Start Date 3/13; Day #  3)  Zosyn 3.375 grams IV q 8 hr (start date 3/14, day #2  Previous Antimicrobial Therapy:    Vancomycin Goal Level: 15-20    Measured / Extrapolated Vancomycin Level: 17.4/16.2    Significant Cultures: None      Labs:  Recent Labs      03/15/18   0535  18   0600  18   1611   CREA  0.65*  0.58*  0.72   BUN  12  14  15   WBC  7.6  9.1  11.8*     Temp (24hrs), Av.4 °F (36.9 °C), Min:98.2 °F (36.8 °C), Max:98.5 °F (36.9 °C)        Paralysis, amputations, malnutrition: No  Creatinine Clearance (mL/min) or Dialysis: >100  Pharmacy Automatic Renal Dosing Protocol - Antimicrobials    Indication for Antimicrobials: Diabetic Foot Infection with likely underlying osteomyelitis, having MRI today. 3/13 x ray shows osteomyelitis    Current Regimen of Each Antimicrobial:  Vancomycin  2500 mg x 1 then 1250 mg Q8H (Start Date 3/13; Day #  2)  Zosyn 3.375 grams IV q 8 hr (start date 3/14, day #1  Previous Antimicrobial Therapy:    Vancomycin Goal Level: 10-15    Measured / Extrapolated Vancomycin Level:     Significant Cultures: None      Labs:  Recent Labs      03/15/18   0535  18   0600  18   1611   CREA  0.65*  0.58*  0.72   BUN  12    15   WBC  7.6  9.1  11.8*     Temp (24hrs), Av.4 °F (36.9 °C), Min:98.2 °F (36.8 °C), Max:98.5 °F (36.9 °C)        Paralysis, amputations, malnutrition: No  Creatinine Clearance (mL/min) or Dialysis: 133  Impression/Plan:   Vancomycin true trough is 16.2. Since diagnosis is diabetic foot infection/osteomyelitis, Vancomycin 1250 mg IV q 8 hr is appropriate. Zosyn 3.35 grams IV q 8 hr is appropriate.   Will check another Vanc trough in a couple of days to check for upward creep. Getting MRI under sedation  Has daily BMP ordered. Pharmacy will follow daily and adjust medications as appropriate for renal function and/or serum levels. Thank you,  CARLOS MarrD      Recommended duration of therapy  http://[facility]/Pharmacy/Clinical%20Companion/Duration%20of%20ABX%20therapy. docx    Renal Dosing  http://[facility]/Pharmacy/Clinical%20Companion/Renal%[id number]. pdf                                        Pharmacy will follow daily and adjust medications as appropriate for renal function and/or serum levels. Thank you,  CARLOS MarrD      Recommended duration of therapy  http://[facility]/Pharmacy/Clinical%20Companion/Duration%20of%20ABX%20therapy. docx    Renal Dosing  http://[facility]/Pharmacy/Clinical%20Companion/Renal%[id number]. pdf

## 2018-03-15 NOTE — PROGRESS NOTES
S: Patient sleeping and snoring upon my arrival into his room. No new complaints. Still has significant pain is in his left leg, most severe at the foot. O:  Visit Vitals    /76 (BP 1 Location: Right arm, BP Patient Position: Sitting)    Pulse (!) 50    Temp 96 °F (35.6 °C)    Resp 14    Ht 6' 1\" (1.854 m)    Wt 100.4 kg (221 lb 5.5 oz)    SpO2 98%    BMI 29.2 kg/m2       RLE:              No proximal streaking  Redness of leg improved  Compartments soft compressible  Benign knee exam - no effusion. No irritability.  No erythema    Pertinent Labs:  WBC: 7.6 from 9.1    MRI foot: no abscess or osteomyelitis    A/P: Left foot ulceration, cellulitis; clinically improving; no abscess or osteomyelitis  - No surgical treatment planned  - continue IV antibiotics  - Wound care consultation  - ID Consultation  - Patient demonstrates drug seeking behavior - requests narcotics right away upon waking from sleep; demanded General Anesthesia for MRI

## 2018-03-15 NOTE — PROGRESS NOTES
Ortho:  Pt states the left foot is unimproved and continues to hurt up his leg.   Left foot with similar appearance, no worsening swelling or erythema, no streaking   MRI- no sign of abscess or osteo  No surgical intervention at this time  WBAT w/ PT     ID consult- pt has seen Dr. Chantale Bee in the past    Sae Ellsworth PA-C

## 2018-03-15 NOTE — ANESTHESIA POSTPROCEDURE EVALUATION
Post-Anesthesia Evaluation and Assessment    Patient: Toro Davila MRN: [id number]  SSN: [id number]    YOB: 1965  Age: 47 y.o. Sex: male       Cardiovascular Function/Vital Signs  Visit Vitals    /81    Pulse (!) 49    Temp 36.4 °C (97.5 °F)    Resp 12    Ht 6' 1\" (1.854 m)    Wt 100.4 kg (221 lb 5.5 oz)    SpO2 99%    BMI 29.2 kg/m2       Patient is status post general, total IV anesthesia anesthesia for * No procedures listed *. Nausea/Vomiting: None    Postoperative hydration reviewed and adequate. Pain:  Pain Scale 1: Numeric (0 - 10) (03/15/18 1869)  Pain Intensity 1: 0 (03/15/18 1212)   Managed    Neurological Status:   Neuro (WDL): Exceptions to WDL (03/15/18 1044)  Neuro  Neurologic State: Drowsy; Eyes open to voice (03/15/18 1044)  Orientation Level: Oriented to person;Oriented to place;Oriented to situation (03/15/18 1044)  Cognition: Appropriate for age attention/concentration; Follows commands (03/15/18 1044)  Speech: Appropriate for age;Clear (03/15/18 1044)  LUE Motor Response: Flaccid (03/15/18 1044)  LLE Motor Response: Purposeful;Weak (03/15/18 1044)  RUE Motor Response: Purposeful (03/15/18 1044)  RLE Motor Response: Purposeful (03/15/18 1044)   At baseline    Mental Status and Level of Consciousness: Arousable    Pulmonary Status:   O2 Device: Nasal cannula (03/15/18 1055)   Adequate oxygenation and airway patent    Complications related to anesthesia: None    Post-anesthesia assessment completed.  No concerns    Signed By: Lis Chacon MD     March 15, 2018

## 2018-03-15 NOTE — PROGRESS NOTES
Ortho:  Pt unable to tolerated MRI, insisting on sedation   Will proceed with MRI under IV sedation(anesthesia administered) at the earliest [address] PA-C

## 2018-03-15 NOTE — PROGRESS NOTES
Physical Therapy Screening:    An [facility] screening referral was triggered for physical therapy based on results obtained during the nursing admission assessment. The patients chart was reviewed and the patient is appropriate for a skilled therapy evaluation if there is a decline in functional mobility from baseline. Please order a consult for physical therapy if you are in agreement and would like an evaluation to be completed. Thank you.     Zachery Ramirez, PT

## 2018-03-15 NOTE — TELEPHONE ENCOUNTER
Pls call Danilo Broussard [place] [phone]     Reports you just saw the patient and he requests that the Doctor come back - he is not giving the nurse any other information      Best number to reach her is   [phone]

## 2018-03-15 NOTE — PERIOP NOTES
TRANSFER - OUT REPORT:    Verbal report given to Ashley Banuelos RN (name) on Ramandeep Atkinson  being transferred to [facility] SURG (unit) for routine post - op       Report consisted of patients Situation, Background, Assessment and   Recommendations(SBAR). Information from the following report(s) SBAR, Kardex, OR Summary, Procedure Summary, Intake/Output and MAR was reviewed with the receiving nurse. Lines:   Peripheral IV 03/13/18 Right Antecubital (Active)   Site Assessment Clean, dry, & intact 3/15/2018 12:48 AM   Phlebitis Assessment 0 3/15/2018 12:48 AM   Infiltration Assessment 0 3/15/2018 12:48 AM   Dressing Status Clean, dry, & intact 3/15/2018 12:48 AM   Dressing Type Transparent 3/15/2018 12:48 AM   Hub Color/Line Status Patent 3/15/2018 12:48 AM   Action Taken Open ports on tubing capped 3/15/2018 12:48 AM   Alcohol Cap Used Yes 3/15/2018 12:48 AM        Opportunity for questions and clarification was provided.       Patient transported with:   [employer]

## 2018-03-15 NOTE — PROGRESS NOTES
Problem: Falls - Risk of  Goal: *Absence of Falls  Document Jordon Fall Risk and appropriate interventions in the flowsheet.    Outcome: Progressing Towards Goal  Fall Risk Interventions:  Mobility Interventions: Patient to call before getting OOB         Medication Interventions: Bed/chair exit alarm, Patient to call before getting OOB

## 2018-03-16 LAB
ATRIAL RATE: 47 BPM
CALCULATED P AXIS, ECG09: 8 DEGREES
CALCULATED R AXIS, ECG10: 14 DEGREES
CALCULATED T AXIS, ECG11: 25 DEGREES
DIAGNOSIS, 93000: NORMAL
GLUCOSE BLD STRIP.AUTO-MCNC: 96 MG/DL (ref 65–100)
P-R INTERVAL, ECG05: 166 MS
Q-T INTERVAL, ECG07: 472 MS
QRS DURATION, ECG06: 96 MS
QTC CALCULATION (BEZET), ECG08: 417 MS
SERVICE CMNT-IMP: NORMAL
VENTRICULAR RATE, ECG03: 47 BPM

## 2018-03-16 PROCEDURE — 74011250637 HC RX REV CODE- 250/637: Performed by: INTERNAL MEDICINE

## 2018-03-16 PROCEDURE — 74011250636 HC RX REV CODE- 250/636: Performed by: INTERNAL MEDICINE

## 2018-03-16 PROCEDURE — 65660000000 HC RM CCU STEPDOWN

## 2018-03-16 PROCEDURE — 82550 ASSAY OF CK (CPK): CPT | Performed by: INTERNAL MEDICINE

## 2018-03-16 PROCEDURE — 86235 NUCLEAR ANTIGEN ANTIBODY: CPT | Performed by: INTERNAL MEDICINE

## 2018-03-16 PROCEDURE — 36415 COLL VENOUS BLD VENIPUNCTURE: CPT | Performed by: INTERNAL MEDICINE

## 2018-03-16 PROCEDURE — 82962 GLUCOSE BLOOD TEST: CPT

## 2018-03-16 PROCEDURE — 84155 ASSAY OF PROTEIN SERUM: CPT | Performed by: INTERNAL MEDICINE

## 2018-03-16 PROCEDURE — 74011250637 HC RX REV CODE- 250/637: Performed by: EMERGENCY MEDICINE

## 2018-03-16 PROCEDURE — 74011250636 HC RX REV CODE- 250/636: Performed by: EMERGENCY MEDICINE

## 2018-03-16 PROCEDURE — 86038 ANTINUCLEAR ANTIBODIES: CPT | Performed by: INTERNAL MEDICINE

## 2018-03-16 RX ORDER — PREDNISONE 5 MG/1
10 TABLET ORAL
Status: DISCONTINUED | OUTPATIENT
Start: 2018-03-16 | End: 2018-03-16

## 2018-03-16 RX ORDER — LIDOCAINE HCL/PF 100 MG/5ML
SYRINGE (ML) INTRAVENOUS
Status: DISPENSED
Start: 2018-03-16 | End: 2018-03-16

## 2018-03-16 RX ORDER — METHADONE HYDROCHLORIDE 10 MG/ML
100 CONCENTRATE ORAL DAILY
Status: COMPLETED | OUTPATIENT
Start: 2018-03-17 | End: 2018-03-20

## 2018-03-16 RX ADMIN — HEPARIN SODIUM 5000 UNITS: 5000 INJECTION, SOLUTION INTRAVENOUS; SUBCUTANEOUS at 00:00

## 2018-03-16 RX ADMIN — Medication 10 ML: at 21:47

## 2018-03-16 RX ADMIN — Medication 10 ML: at 06:10

## 2018-03-16 RX ADMIN — HYDROMORPHONE HYDROCHLORIDE 2 MG: 2 TABLET ORAL at 11:24

## 2018-03-16 RX ADMIN — Medication 10 ML: at 21:48

## 2018-03-16 RX ADMIN — HYDROMORPHONE HYDROCHLORIDE 2 MG: 2 TABLET ORAL at 07:14

## 2018-03-16 RX ADMIN — HYDROMORPHONE HYDROCHLORIDE 2 MG: 2 TABLET ORAL at 03:00

## 2018-03-16 RX ADMIN — HEPARIN SODIUM 5000 UNITS: 5000 INJECTION, SOLUTION INTRAVENOUS; SUBCUTANEOUS at 13:06

## 2018-03-16 RX ADMIN — AMOXICILLIN AND CLAVULANATE POTASSIUM 1 TABLET: 875; 125 TABLET, FILM COATED ORAL at 17:18

## 2018-03-16 RX ADMIN — PREGABALIN 150 MG: 150 CAPSULE ORAL at 17:18

## 2018-03-16 RX ADMIN — LEVOTHYROXINE SODIUM 75 MCG: 25 TABLET ORAL at 09:16

## 2018-03-16 RX ADMIN — ASPIRIN 81 MG: 81 TABLET, COATED ORAL at 09:16

## 2018-03-16 RX ADMIN — HYDROMORPHONE HYDROCHLORIDE 1 MG: 2 INJECTION, SOLUTION INTRAMUSCULAR; INTRAVENOUS; SUBCUTANEOUS at 13:09

## 2018-03-16 RX ADMIN — HYDROMORPHONE HYDROCHLORIDE 2 MG: 2 TABLET ORAL at 21:36

## 2018-03-16 RX ADMIN — ALLOPURINOL 100 MG: 100 TABLET ORAL at 09:16

## 2018-03-16 RX ADMIN — HYDROMORPHONE HYDROCHLORIDE 1 MG: 2 INJECTION, SOLUTION INTRAMUSCULAR; INTRAVENOUS; SUBCUTANEOUS at 18:56

## 2018-03-16 RX ADMIN — HYDROMORPHONE HYDROCHLORIDE 1 MG: 2 INJECTION, SOLUTION INTRAMUSCULAR; INTRAVENOUS; SUBCUTANEOUS at 09:21

## 2018-03-16 RX ADMIN — AMOXICILLIN AND CLAVULANATE POTASSIUM 1 TABLET: 875; 125 TABLET, FILM COATED ORAL at 06:09

## 2018-03-16 RX ADMIN — LISINOPRIL 20 MG: 20 TABLET ORAL at 09:16

## 2018-03-16 RX ADMIN — HYDROMORPHONE HYDROCHLORIDE 2 MG: 2 TABLET ORAL at 17:22

## 2018-03-16 RX ADMIN — HEPARIN SODIUM 5000 UNITS: 5000 INJECTION, SOLUTION INTRAVENOUS; SUBCUTANEOUS at 21:47

## 2018-03-16 RX ADMIN — Medication 10 ML: at 21:46

## 2018-03-16 RX ADMIN — Medication 10 ML: at 13:06

## 2018-03-16 RX ADMIN — METHADONE HYDROCHLORIDE 100 MG: 10 CONCENTRATE ORAL at 10:28

## 2018-03-16 RX ADMIN — PREGABALIN 150 MG: 150 CAPSULE ORAL at 09:16

## 2018-03-16 NOTE — PROGRESS NOTES
Pt is a 47 y.o.  male, admitted for osteomyelitis of foot. Pt was alert and oriented, upon CM room visit. Pt reported that he resides alone in his one story home (4 steps into main entrance). Pt reported that he is independent with ADLs, and does not drive. Pt will possibly need transport at d/c. Pt reported that he is active with PCP: seen Feb 2018 for follow up. Pt reported that he has DME at home: [employer]. Pt reported that he has has had home health in the past, but no SNF. Pt reported that his friend assist with bandage change. CM informed pt of recommended home health for wound care. Pt reported that \"he does not know about home health, due to his living arrangements, and he will need to make a phone call regarding his situtation\". CM informed pt that she will return back to pt's room and discuss need of care with pt. CM will update MD, via FYI    UPDATE: 12:28PM    CM was having trouble finding home health that would accept pt with Medicaid insurance, although pt is unsure if he would like home health services. CM sent referral to [facility] in case pt's want HH. [facility] have accepted pt. CM will inform pt of the following home health agency [address] Management Interventions  PCP Verified by CM: Yes  Mode of Transport at Discharge:  Other (see comment) (pt's friend )  Transition of Care Consult (CM Consult): Discharge Planning, [address]: No  Reason Outside [place]: Unable to staff case  Discharge Durable Medical Equipment: No  Physical Therapy Consult: No  Occupational Therapy Consult: No  Speech Therapy Consult: No  Current Support Network: Lives Alone, Own Home, Lives with Caregiver  Confirm Follow Up Transport: Family  Plan discussed with Pt/Family/Caregiver: Yes  Discharge Location  Discharge Placement: Home with home health    JUVENCIO Alexander   [phone]

## 2018-03-16 NOTE — PROGRESS NOTES
NARs made q 2 hours this shift , no new changes in assessment noted, patient was resting with eyes closed uipon entering room. Was sleeping fairly soundly, upon awaking immediately requesting pain medication. Will medicate when able.

## 2018-03-16 NOTE — PROGRESS NOTES
Hospitalist Progress Note    NAME: Marylen Hoguet   :  1965   MRN:  [id number]   LOS:   3      Assessment / Plan:  Chronic left foot wound, severe pain, ruled out osteomyelitis  -appreciate ortho evaluation s/p debridement  -MRI done no evidence of acute osteo, no subcutaneous edema, did note loss of signal on wound area which could be due to fibrosis  -appreciate ID evaluation, less likely acutely infected which I agree with, ESR negative, no subcutaneous edema to suggest cellulitis, wound culture negative  -patient had extensive vascular evaluation, EUGENE negatives, multiple vascular surgeons have evaluated patient had stated that there was no indication for any type of procedure  -patient does not have diabetes, hba1c normal  -discussed briefly with Dr. Sidney Jaffe from ortho, he is unsure why patient's wound isn't healing as it should, does note odd contractures of feet/toes, fibrosis of skin, consider rheumatology evaluation  -will ask rheumatology for their input  -will order MARLINE, ANCA  -may benefit from dermatology referral as outpatient  -counseled patient on smoking cessation to help wound healing    Chronic pain on methadone  -called patient's methadone clinic [facility], confirmed patient's daily dose of methadone is 100 mg   -ordered patient's home dose daily discussed with pharmacy  -dilaudid PRN breakthrough pain    Hypertension  -lisinopril    HLD  -not on medication    Gout  -allopurinol    Hypothyroidism  -synthroid, TSH mildly elevated T4 normal    Bladder mass  -plan for outpatient follow up    Tobacco use  -nicotine patch prn    Code status: Full  Prophylaxis: Hep SQ  Recommended Disposition: pending hospital course and PT eval     Subjective:     Chief Complaint: foot pain  Pain very severe, can't hardly walk on foot    Objective:     VITALS:   Last 24hrs VS reviewed since prior progress note.  Most recent are:  Patient Vitals for the past 24 hrs:   Temp Pulse Resp BP SpO2   18 1118 98.1 °F (36.7 °C) (!) 53 16 168/86 98 %   03/16/18 0849 98.1 °F (36.7 °C) (!) 58 19 (!) 143/94 100 %   03/16/18 0546 97.8 °F (36.6 °C) (!) 50 20 159/77 97 %   03/15/18 2023 97.8 °F (36.6 °C) (!) 56 19 125/78 98 %   03/15/18 1545 98.1 °F (36.7 °C) (!) 58 16 142/65 98 %       Intake/Output Summary (Last 24 hours) at 03/16/18 1255  Last data filed at 03/16/18 0850   Gross per 24 hour   Intake              820 ml   Output             1865 ml   Net            -1045 ml        PHYSICAL EXAM:  General: Alert, cooperative, no acute distress    EENT:  EOMI. Anicteric sclerae. Mucous membranes moist  Resp:  CTA bilaterally, no wheezing or rales. No accessory muscle use  CV:  Regular rhythm, no edema  GI:  Soft, non distended, non tender. +Bowel sounds  Neurologic:  Alert and oriented X 3, normal speech  Psych:   Good insight, not anxious nor agitated  Skin:  Dressing over wound today, did not unwrap foot  ________________________________________________________________________  Care Plan discussed with:    Comments   Patient x    Family      RN x    Care Manager     Consultant  x                      Multidiciplinary team rounds were held today with , nursing, pharmacist and clinical coordinator. Patient's plan of care was discussed; medications were reviewed and discharge planning was addressed. ________________________________________________________________________  Total NON critical care TIME:  30   Minutes    >50% of visit spent in counseling and coordination of care       ________________________________________________________________________    Procedures: see electronic medical records for all procedures/Xrays and details which were not copied into this note but were reviewed prior to creation of Plan. LABS:  I reviewed today's most current labs and imaging studies.   Pertinent labs include:  Recent Labs      03/15/18   0535  03/14/18   0600  03/13/18   1611   WBC  7.6  9.1  11.8*   HGB 11.8*  12.0*  13.0   HCT  37.0  37.4  39.6   PLT  174  180  238     Recent Labs      03/15/18   0535  03/14/18   0600  03/13/18   1611   NA  139  141  141   K  4.0  4.2  4.9   CL  104  107  105   CO2  30  27  28   GLU  113*  102*  109*   BUN  12  14  15   CREA  0.65*  0.58*  0.72   CA  8.5  8.6  8.9   MG   --   2.1   --    PHOS   --   4.4   --        Signed: Arpit Scott MD

## 2018-03-16 NOTE — PROGRESS NOTES
General Surgery End of Shift Nursing Note    Bedside shift change report given to Jacquelyn Victoria (oncoming nurse) by Sam Ramsey. Walter Meng RN (offgoing nurse). Report included the following information SBAR, Kardex, Intake/Output, MAR and Recent Results. Shift worked:   7a to 7p   Summary of shift:    Dressing changed to left foot today by wound care   Issues for physician to address:   none     Number times ambulated in hallway past shift: in room    Number of times OOB to chair past shift: 2    Pain Management:  Current medication: Dilaudid  Patient states pain is manageable on current pain medication: YES    GI:    Current diet:  DIET REGULAR  DIET ONE TIME MESSAGE    Tolerating current diet: YES  Passing flatus: YES  Last Bowel Movement: yesterday   Appearance: soft    Respiratory:    Incentive Spirometer at bedside: YES  Patient instructed on use: YES    Patient Safety:    Falls Score: 3  Bed Alarm On? YES  Sitter?  No    Bevin Osler, RN
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
## 2018-03-16 NOTE — PROGRESS NOTES
S: Patient sitting and resting in chair comfortably. No  New complaints. O:  Visit Vitals    /86 (BP 1 Location: Right arm, BP Patient Position: Sitting)    Pulse (!) 53    Temp 98.1 °F (36.7 °C)    Resp 16    Ht 6' 1\" (1.854 m)    Wt 100.4 kg (221 lb 5.5 oz)    SpO2 98%    BMI 29.2 kg/m2       Left lower extremity:  No soft tissue swelling. Stable ulceration at dorsum of left forefoot. No significant erythema of the leg. This has completely resolved. A/P:   Left lower extremity chronic ulceration, possible cellulitis. Examination and inflammatory markers unimpressive for active infection; MRI demonstrates no abscess or osteomyelitis. - No indication for orthopedic surgical intervention. I will sign off on this patient. Patient can follow up with his treating podiatrist of [facility]. - Abx per primary team/ID - currently recommending no abx  - I have high suspicion that patient has some underlying vascular or connective tissue disease preventing wound healing. Has been worked up by Vascular in past, no intervention recommended. - call or reconsult if further questions or new complaints.

## 2018-03-16 NOTE — CONSULTS
RHEUMATOLOGY CONSULT    RE:?SCLERODERMA, ASKED BY Zaira Host    IMPRESSION:   skin changes not typical of systemic or limited sclerosis, and there is no Raynaud's. Consider Morphea  Consider Lipodermatosclerosis is a fibrosing panniculitis that usually occurs on the lower legs in association with chronic venous insufficiency. Patients present with indurated, sometimes circumferential plaques that may give the lower leg the appearance of an inverted champagne bottle. Consider trauma induced skin changes. There is trace edema present, but he does not give a good Hx of chronic severe edema to cause induration. Consider Myxedema- TSH is only slightly elevated. DISCUSSION:  It is interesting that the skin changes are in the LE and are much worse on the stroke side. In many patients, the diagnosis of morphea can be made based upon the clinical findings. However, histopathologic studies and radiologic imaging can be useful to confirm a diagnosis or evaluate the extent of disease. MRI findings that may be seen in morphea include fascial thickening, fascial enhancement, articular synovitis, tenosynovitis, perifascial enhancement, myositis, enthesitis, bone marrow involvement, and subcutaneous septal thickening. DDx:  Scleroderma (systemic sclerosis)- lesions are not typical  And has no Raynaud's  Lipodermatosclerosis  Eosinophilic fasciitis- occass.  eos elevated  Trauma-induced  Fat nephrogenic systemic fibrosis necrosis (intramuscular injections)  Chronic befoy-abplob-osvi disease  Lichen sclerosus  Pretibial myxedema  Connective tissue nevi  Morpheaform basal cell carcinoma  Chemical mediated sclerosing skin conditions (toxic oil syndrome, rapeseed oil, etc.)  Lyme disease (acrodermatitis atrophicans)  Phenylketonuria  Scleromyxedema, scleroderma chronic, pretibial myxedema  POEMS syndrome (Polyneuropathy, Organomegaly, Endocrinopathy, Monoclonal protein, Skin changes)    RECOMMENDATION:  Check CPK, SCL-70, MARLINE, SPEP  Consider MRI left thigh for signs c/w Morphea. If this is Morphea- is it active or not and if the MRI does not show deep fascial involvement, then he might be a candidate for UV therapy    Tissue biopsies (generally reserved for cases in which the diagnosis is in question) should be done in this case since I am not sure. All biopsy specimens should include subcutaneous fat. A punch biopsy that extends into the subcutaneous fat can be performed in superficial lesions of morphea. In cases of deep involvement, a punch biopsy is unlikely to reach sufficient depth, and an incisional biopsy should be performed. The biopsy specimen can be taken from the inflammatory or indurated border if present, or from a central sclerotic area. The pathologist should be informed of the clinical features of the site from which the biopsy is taken. For lesions with minimal clinical change, a second biopsy from unaffected skin in a location similar to the lesion site (eg, contralateral skin) should be performed to assist with interpretation of the lesional pathologic findings. The pathology of morphea varies based upon biopsy site, lesion stage, and lesion depth. Biopsies performed from inflammatory lesions demonstrate an interstitial and perivascular inflammatory cell infiltrate composed primarily of lymphocytes and plasma cells. Eosinophils, mast cells, and macrophages may be present. Inflammation may extend into the subcutaneous tissues. In addition, tissue edema, enlarged tortuous vessels, and thickened collagen bundles may be observed. As sclerosis progresses, lesions demonstrate homogenization of the papillary dermis and thickened collagen bundles extending into the reticular dermis or beyond. Fat surrounding eccrine glands diminishes, and advanced sclerotic lesions exhibit compression and loss of appendageal structures.  Few blood vessels are present, and those that remain exhibit fibrotic walls and narrowed lumina. In specimens from deep morphea, the deep reticular dermis, subcutis, and fascia show sclerotic changes. The atrophic phase of morphea is characterized by loss of inflammatory cell infiltrate, less sclerosis, and an absence of appendageal structures. Telangiectasia may be evident. HPI: Asked to see regarding thick skin and possible relationship to poor wound healing. Has had left foot wound for 1 year. S/p wound clinic. Recent artery studies reveal good blood flow to feet. Has thick skin in left arm L>R leg. 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 He denies Raynaud's, swallowing difficulties, SOB, erectile dysfunction, digital sores / ulcers, tight skin on fingers.     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    Drug overdose 10/09/2016    HTN (hypertension) 12/21/2017    Bilateral cellulitis of lower leg 12/21/2017    Long term current use of methadone for pain control 12/27/2017    Major depressive disorder with current active episode 12/27/2017    Physical debility 12/27/2017    Bladder tumor 03/06/2018    Brain aneurysm 12/30/2013    Chronic pain 12/30/2013    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    Spasticity 12/30/2013    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    FH: bladder cancer 03/06/2018     Resolved Ambulatory Problems     Diagnosis Date Noted    No Resolved Ambulatory Problems     Past Medical History:   Diagnosis Date    Aneurysm ([address]     Gout     Hypercholesterolemia     Hypertension     Lesion of bladder     Seizures ([address]     Stroke ([address] 2006    Thromboembolus ([address]     Thyroid disease     TIA (transient ischemic attack) 2012     Past Surgical History:   Procedure Laterality Date    HX ORTHOPAEDIC      KNEE ARTHROSCP HARV      left knee     No current facility-administered medications on file prior to encounter. Current Outpatient Prescriptions on File Prior to Encounter   Medication Sig Dispense Refill    colchicine (COLCRYS) 0.6 mg tablet Take 1 Tab by mouth daily as needed. For gout flare 30 Tab 0    allopurinol (ZYLOPRIM) 100 mg tablet Take 1 Tab by mouth daily. 30 Tab 0    levothyroxine (SYNTHROID) 75 mcg tablet Take 1 Tab by mouth Daily (before breakfast). 30 Tab 0    lisinopril (PRINIVIL, ZESTRIL) 20 mg tablet Take 1 Tab by mouth daily. 30 Tab 0     Allergies   Allergen Reactions    Sulfamethoxazole-Trimethoprim Rash     L eye and L hand    Ciprofloxacin Hives     Per pcp records    Codeine Hives     Tolerates dilaudid, oxycodone    Lyrica [Pregabalin] Hives    Sulfa (Sulfonamide Antibiotics) Rash    Ultram [Tramadol] Hives     Social History     Social History    Marital status:      Spouse name: N/A    Number of children: N/A    Years of education: N/A     Occupational History    Not on file. Social History Main Topics    Smoking status: Current Every Day Smoker     Packs/day: 1.00    Smokeless tobacco: Never Used    Alcohol use 8.4 oz/week     14 Cans of beer per week    Drug use: Yes     Special: Prescription    Sexual activity: Yes     Other Topics Concern    Not on file     Social History Narrative       Blood pressure 114/82, pulse (!) 54, temperature 97.7 °F (36.5 °C), resp. rate 18, height 6' 1\" (1.854 m), weight 100.4 kg (221 lb 5.5 oz), SpO2 93 %. ENT- UNREMARKABLE  CHEST- CLEAR  CV- RRR  ABD- SOFT, NO BRUITS  EXT- TRACE PITTING EDEMA B/L LEGS  SKIN- VARIOUS LINEAR AND NON-LINEAR SKIN THICKENING AND TIGHTNESS AROUND L SHOULDER, LEFT FOREARM, LEFT LEG (MOSTLY OVER CALF), FINGERS ARE WITHOUT SCLERODACTYLY.  Muscles are tender under tight skin of left thigh and left calf, but not left arm, shoulder or R side.

## 2018-03-17 LAB
ANION GAP SERPL CALC-SCNC: 4 MMOL/L (ref 5–15)
BACTERIA SPEC CULT: ABNORMAL
BUN SERPL-MCNC: 12 MG/DL (ref 6–20)
BUN/CREAT SERPL: 17 (ref 12–20)
CALCIUM SERPL-MCNC: 8.6 MG/DL (ref 8.5–10.1)
CHLORIDE SERPL-SCNC: 103 MMOL/L (ref 97–108)
CK SERPL-CCNC: 46 U/L (ref 39–308)
CO2 SERPL-SCNC: 32 MMOL/L (ref 21–32)
CREAT SERPL-MCNC: 0.69 MG/DL (ref 0.7–1.3)
ERYTHROCYTE [DISTWIDTH] IN BLOOD BY AUTOMATED COUNT: 16.2 % (ref 11.5–14.5)
GLUCOSE SERPL-MCNC: 88 MG/DL (ref 65–100)
GRAM STN SPEC: ABNORMAL
GRAM STN SPEC: ABNORMAL
HCT VFR BLD AUTO: 37.2 % (ref 36.6–50.3)
HGB BLD-MCNC: 12.1 G/DL (ref 12.1–17)
MCH RBC QN AUTO: 28.9 PG (ref 26–34)
MCHC RBC AUTO-ENTMCNC: 32.5 G/DL (ref 30–36.5)
MCV RBC AUTO: 89 FL (ref 80–99)
NRBC # BLD: 0 K/UL (ref 0–0.01)
NRBC BLD-RTO: 0 PER 100 WBC
PLATELET # BLD AUTO: 169 K/UL (ref 150–400)
PMV BLD AUTO: 11 FL (ref 8.9–12.9)
POTASSIUM SERPL-SCNC: 4 MMOL/L (ref 3.5–5.1)
RBC # BLD AUTO: 4.18 M/UL (ref 4.1–5.7)
SERVICE CMNT-IMP: ABNORMAL
SODIUM SERPL-SCNC: 139 MMOL/L (ref 136–145)
WBC # BLD AUTO: 9.1 K/UL (ref 4.1–11.1)

## 2018-03-17 PROCEDURE — 65660000000 HC RM CCU STEPDOWN

## 2018-03-17 PROCEDURE — 86235 NUCLEAR ANTIGEN ANTIBODY: CPT | Performed by: INTERNAL MEDICINE

## 2018-03-17 PROCEDURE — 74011250637 HC RX REV CODE- 250/637: Performed by: EMERGENCY MEDICINE

## 2018-03-17 PROCEDURE — 82550 ASSAY OF CK (CPK): CPT | Performed by: EMERGENCY MEDICINE

## 2018-03-17 PROCEDURE — 74011250637 HC RX REV CODE- 250/637: Performed by: INTERNAL MEDICINE

## 2018-03-17 PROCEDURE — 85027 COMPLETE CBC AUTOMATED: CPT | Performed by: INTERNAL MEDICINE

## 2018-03-17 PROCEDURE — 83520 IMMUNOASSAY QUANT NOS NONAB: CPT | Performed by: INTERNAL MEDICINE

## 2018-03-17 PROCEDURE — 86225 DNA ANTIBODY NATIVE: CPT | Performed by: INTERNAL MEDICINE

## 2018-03-17 PROCEDURE — 74011250636 HC RX REV CODE- 250/636: Performed by: INTERNAL MEDICINE

## 2018-03-17 PROCEDURE — 36415 COLL VENOUS BLD VENIPUNCTURE: CPT | Performed by: EMERGENCY MEDICINE

## 2018-03-17 PROCEDURE — 80048 BASIC METABOLIC PNL TOTAL CA: CPT | Performed by: EMERGENCY MEDICINE

## 2018-03-17 PROCEDURE — 74011250636 HC RX REV CODE- 250/636: Performed by: EMERGENCY MEDICINE

## 2018-03-17 PROCEDURE — 86038 ANTINUCLEAR ANTIBODIES: CPT | Performed by: INTERNAL MEDICINE

## 2018-03-17 RX ORDER — HYDROMORPHONE HYDROCHLORIDE 2 MG/1
4 TABLET ORAL
Status: DISCONTINUED | OUTPATIENT
Start: 2018-03-17 | End: 2018-03-24

## 2018-03-17 RX ADMIN — HYDROMORPHONE HYDROCHLORIDE 4 MG: 2 TABLET ORAL at 16:55

## 2018-03-17 RX ADMIN — HEPARIN SODIUM 5000 UNITS: 5000 INJECTION, SOLUTION INTRAVENOUS; SUBCUTANEOUS at 16:55

## 2018-03-17 RX ADMIN — PREGABALIN 150 MG: 150 CAPSULE ORAL at 10:45

## 2018-03-17 RX ADMIN — LISINOPRIL 20 MG: 20 TABLET ORAL at 10:45

## 2018-03-17 RX ADMIN — Medication 10 ML: at 22:16

## 2018-03-17 RX ADMIN — HYDROMORPHONE HYDROCHLORIDE 1 MG: 2 INJECTION, SOLUTION INTRAMUSCULAR; INTRAVENOUS; SUBCUTANEOUS at 01:00

## 2018-03-17 RX ADMIN — HYDROMORPHONE HYDROCHLORIDE 2 MG: 2 TABLET ORAL at 11:06

## 2018-03-17 RX ADMIN — Medication 10 ML: at 06:55

## 2018-03-17 RX ADMIN — Medication 10 ML: at 06:53

## 2018-03-17 RX ADMIN — HEPARIN SODIUM 5000 UNITS: 5000 INJECTION, SOLUTION INTRAVENOUS; SUBCUTANEOUS at 06:52

## 2018-03-17 RX ADMIN — HYDROMORPHONE HYDROCHLORIDE 2 MG: 2 TABLET ORAL at 03:11

## 2018-03-17 RX ADMIN — ALLOPURINOL 100 MG: 100 TABLET ORAL at 10:45

## 2018-03-17 RX ADMIN — HYDROMORPHONE HYDROCHLORIDE 1 MG: 2 INJECTION, SOLUTION INTRAMUSCULAR; INTRAVENOUS; SUBCUTANEOUS at 11:06

## 2018-03-17 RX ADMIN — HYDROMORPHONE HYDROCHLORIDE 4 MG: 2 TABLET ORAL at 20:46

## 2018-03-17 RX ADMIN — LEVOTHYROXINE SODIUM 75 MCG: 25 TABLET ORAL at 06:53

## 2018-03-17 RX ADMIN — ASPIRIN 81 MG: 81 TABLET, COATED ORAL at 10:45

## 2018-03-17 RX ADMIN — PREGABALIN 150 MG: 150 CAPSULE ORAL at 16:55

## 2018-03-17 RX ADMIN — METHADONE HYDROCHLORIDE 100 MG: 10 CONCENTRATE ORAL at 11:59

## 2018-03-17 RX ADMIN — HYDROMORPHONE HYDROCHLORIDE 1 MG: 2 INJECTION, SOLUTION INTRAMUSCULAR; INTRAVENOUS; SUBCUTANEOUS at 01:25

## 2018-03-17 NOTE — PROGRESS NOTES
Hospitalist Progress Note    NAME: Boris Blackman   :  1965   MRN:  [id number]   LOS:   4      Assessment / Plan:  Chronic left foot ulcer of unclear etiology, severe pain, ruled out osteomyelitis  -appreciate ortho evaluation s/p debridement  -MRI done no evidence of acute osteo, no subcutaneous edema, did note loss of signal on wound area which could be due to fibrosis  -appreciate ID evaluation, less likely acutely infected which I agree with, ESR negative, no subcutaneous edema to suggest cellulitis, wound culture negative  -patient had extensive vascular evaluation, EUGENE negatives, multiple vascular surgeons have evaluated patient had stated that there was no indication for any type of procedure  -patient does not have diabetes, hba1c normal  -greatly appreciate rheumatology evaluation, consider morphea, lipodermatosclerosis  -f/u labs ordered for work up  -will hold off on MRI of thigh for now as patient requires conscious sedation for MRI and had MRI of foot few days ago  -will contact general surgery on Monday regarding biopsy  -counseled patient on smoking cessation to help wound healing  -patient can barely walk due to pain, will order PT/OT evaluation    Chronic pain on methadone  -called patient's methadone clinic [facility], confirmed patient's daily dose of methadone is 100 mg   -ordered patient's home dose daily discussed with pharmacy  -dilaudid PO PRN breakthrough pain    Hypertension  -lisinopril    HLD  -not on medication    Gout  -allopurinol    Hypothyroidism  -synthroid, TSH mildly elevated T4 normal    Bladder mass  -plan for outpatient follow up    Tobacco use  -nicotine patch prn    Code status: Full  Prophylaxis: Hep SQ  Recommended Disposition: pending hospital course and PT eval     Subjective:     Chief Complaint: foot pain  Pain unchanged, patietn can not walk well on foot    Objective:     VITALS:   Last 24hrs VS reviewed since prior progress note.  Most recent are:  Patient Vitals for the past 24 hrs:   Temp Pulse Resp BP SpO2   03/17/18 1239 98.3 °F (36.8 °C) (!) 50 18 172/82 98 %   03/17/18 0747 97.6 °F (36.4 °C) (!) 48 18 148/68 99 %   03/17/18 0309 97.7 °F (36.5 °C) (!) 55 20 153/80 97 %   03/17/18 0017 98.1 °F (36.7 °C) (!) 55 18 (!) 148/92 98 %   03/16/18 1925 97.9 °F (36.6 °C) (!) 58 20 108/85 99 %   03/16/18 1524 97.7 °F (36.5 °C) (!) 54 18 114/82 93 %       Intake/Output Summary (Last 24 hours) at 03/17/18 1337  Last data filed at 03/17/18 0309   Gross per 24 hour   Intake              120 ml   Output              900 ml   Net             -780 ml        PHYSICAL EXAM:  General: Alert, cooperative, no acute distress    EENT:  EOMI. Anicteric sclerae. Mucous membranes moist  Resp:  CTA bilaterally, no wheezing or rales. No accessory muscle use  CV:  Regular rhythm, no edema  GI:  Soft, non distended, non tender. +Bowel sounds  Neurologic:  Alert and oriented X 3, normal speech  Psych:   Good insight, not anxious nor agitated  Skin:  Dressing over wound, skin thickening on left arm and left leg, skin darker on left leg, very tender to palpation  ________________________________________________________________________  Care Plan discussed with:    Comments   Patient x    Family      RN     Care Manager     Consultant                        Multidiciplinary team rounds were held today with , nursing, pharmacist and clinical coordinator. Patient's plan of care was discussed; medications were reviewed and discharge planning was addressed.      ________________________________________________________________________  Total NON critical care TIME:  30   Minutes    >50% of visit spent in counseling and coordination of care       ________________________________________________________________________    Procedures: see electronic medical records for all procedures/Xrays and details which were not copied into this note but were reviewed prior to creation of Plan.      LABS:  I reviewed today's most current labs and imaging studies.   Pertinent labs include:  Recent Labs      03/17/18 0323  03/15/18   0535   WBC  9.1  7.6   HGB  12.1  11.8*   HCT  37.2  37.0   PLT  169  174     Recent Labs      03/17/18   0323  03/15/18   0535   NA  139  139   K  4.0  4.0   CL  103  104   CO2  32  30   GLU  88  113*   BUN  12  12   CREA  0.69*  0.65*   CA  8.6  8.5       Signed: Jerel Mendoza MD

## 2018-03-18 LAB
ANION GAP SERPL CALC-SCNC: 4 MMOL/L (ref 5–15)
APPEARANCE UR: ABNORMAL
BACTERIA SPEC CULT: NORMAL
BACTERIA URNS QL MICRO: NEGATIVE /HPF
BILIRUB UR QL CFM: NEGATIVE
BUN SERPL-MCNC: 14 MG/DL (ref 6–20)
BUN/CREAT SERPL: 23 (ref 12–20)
CALCIUM SERPL-MCNC: 8.7 MG/DL (ref 8.5–10.1)
CHLORIDE SERPL-SCNC: 104 MMOL/L (ref 97–108)
CO2 SERPL-SCNC: 31 MMOL/L (ref 21–32)
COLOR UR: ABNORMAL
CREAT SERPL-MCNC: 0.6 MG/DL (ref 0.7–1.3)
EPITH CASTS URNS QL MICRO: ABNORMAL /LPF
ERYTHROCYTE [DISTWIDTH] IN BLOOD BY AUTOMATED COUNT: 16.3 % (ref 11.5–14.5)
ERYTHROCYTE [DISTWIDTH] IN BLOOD BY AUTOMATED COUNT: 16.4 % (ref 11.5–14.5)
GLUCOSE BLD STRIP.AUTO-MCNC: 123 MG/DL (ref 65–100)
GLUCOSE SERPL-MCNC: 98 MG/DL (ref 65–100)
GLUCOSE UR STRIP.AUTO-MCNC: NEGATIVE MG/DL
HCT VFR BLD AUTO: 36.9 % (ref 36.6–50.3)
HCT VFR BLD AUTO: 39.6 % (ref 36.6–50.3)
HGB BLD-MCNC: 12 G/DL (ref 12.1–17)
HGB BLD-MCNC: 12.7 G/DL (ref 12.1–17)
HGB UR QL STRIP: ABNORMAL
KETONES UR QL STRIP.AUTO: ABNORMAL MG/DL
LEUKOCYTE ESTERASE UR QL STRIP.AUTO: ABNORMAL
MCH RBC QN AUTO: 28.4 PG (ref 26–34)
MCH RBC QN AUTO: 28.7 PG (ref 26–34)
MCHC RBC AUTO-ENTMCNC: 32.1 G/DL (ref 30–36.5)
MCHC RBC AUTO-ENTMCNC: 32.5 G/DL (ref 30–36.5)
MCV RBC AUTO: 88.3 FL (ref 80–99)
MCV RBC AUTO: 88.6 FL (ref 80–99)
NITRITE UR QL STRIP.AUTO: NEGATIVE
NRBC # BLD: 0 K/UL (ref 0–0.01)
NRBC # BLD: 0 K/UL (ref 0–0.01)
NRBC BLD-RTO: 0 PER 100 WBC
NRBC BLD-RTO: 0 PER 100 WBC
PH UR STRIP: 6.5 [PH] (ref 5–8)
PLATELET # BLD AUTO: 179 K/UL (ref 150–400)
PLATELET # BLD AUTO: 222 K/UL (ref 150–400)
PMV BLD AUTO: 10.9 FL (ref 8.9–12.9)
PMV BLD AUTO: 11.6 FL (ref 8.9–12.9)
POTASSIUM SERPL-SCNC: 4.2 MMOL/L (ref 3.5–5.1)
PROT UR STRIP-MCNC: 30 MG/DL
RBC # BLD AUTO: 4.18 M/UL (ref 4.1–5.7)
RBC # BLD AUTO: 4.47 M/UL (ref 4.1–5.7)
RBC #/AREA URNS HPF: >100 /HPF (ref 0–5)
SERVICE CMNT-IMP: ABNORMAL
SERVICE CMNT-IMP: NORMAL
SODIUM SERPL-SCNC: 139 MMOL/L (ref 136–145)
SP GR UR REFRACTOMETRY: 1.01 (ref 1–1.03)
UA: UC IF INDICATED,UAUC: ABNORMAL
UROBILINOGEN UR QL STRIP.AUTO: 1 EU/DL (ref 0.2–1)
WBC # BLD AUTO: 11.2 K/UL (ref 4.1–11.1)
WBC # BLD AUTO: 8.9 K/UL (ref 4.1–11.1)
WBC URNS QL MICRO: ABNORMAL /HPF (ref 0–4)

## 2018-03-18 PROCEDURE — 80048 BASIC METABOLIC PNL TOTAL CA: CPT | Performed by: EMERGENCY MEDICINE

## 2018-03-18 PROCEDURE — 74011250636 HC RX REV CODE- 250/636: Performed by: EMERGENCY MEDICINE

## 2018-03-18 PROCEDURE — 85027 COMPLETE CBC AUTOMATED: CPT | Performed by: INTERNAL MEDICINE

## 2018-03-18 PROCEDURE — 74011250637 HC RX REV CODE- 250/637: Performed by: INTERNAL MEDICINE

## 2018-03-18 PROCEDURE — 81001 URINALYSIS AUTO W/SCOPE: CPT | Performed by: INTERNAL MEDICINE

## 2018-03-18 PROCEDURE — 36415 COLL VENOUS BLD VENIPUNCTURE: CPT | Performed by: EMERGENCY MEDICINE

## 2018-03-18 PROCEDURE — 82962 GLUCOSE BLOOD TEST: CPT

## 2018-03-18 PROCEDURE — 74011250637 HC RX REV CODE- 250/637: Performed by: EMERGENCY MEDICINE

## 2018-03-18 PROCEDURE — 65660000000 HC RM CCU STEPDOWN

## 2018-03-18 PROCEDURE — 87086 URINE CULTURE/COLONY COUNT: CPT | Performed by: INTERNAL MEDICINE

## 2018-03-18 RX ADMIN — HEPARIN SODIUM 5000 UNITS: 5000 INJECTION, SOLUTION INTRAVENOUS; SUBCUTANEOUS at 05:22

## 2018-03-18 RX ADMIN — Medication 10 ML: at 05:23

## 2018-03-18 RX ADMIN — LISINOPRIL 20 MG: 20 TABLET ORAL at 08:34

## 2018-03-18 RX ADMIN — METHADONE HYDROCHLORIDE 100 MG: 10 CONCENTRATE ORAL at 08:34

## 2018-03-18 RX ADMIN — LEVOTHYROXINE SODIUM 75 MCG: 25 TABLET ORAL at 05:22

## 2018-03-18 RX ADMIN — Medication 10 ML: at 16:49

## 2018-03-18 RX ADMIN — PREGABALIN 150 MG: 150 CAPSULE ORAL at 16:47

## 2018-03-18 RX ADMIN — HYDROMORPHONE HYDROCHLORIDE 4 MG: 2 TABLET ORAL at 00:52

## 2018-03-18 RX ADMIN — ASPIRIN 81 MG: 81 TABLET, COATED ORAL at 08:34

## 2018-03-18 RX ADMIN — HYDROMORPHONE HYDROCHLORIDE 4 MG: 2 TABLET ORAL at 05:22

## 2018-03-18 RX ADMIN — ALLOPURINOL 100 MG: 100 TABLET ORAL at 08:34

## 2018-03-18 RX ADMIN — HYDROMORPHONE HYDROCHLORIDE 4 MG: 2 TABLET ORAL at 20:51

## 2018-03-18 RX ADMIN — PREGABALIN 150 MG: 150 CAPSULE ORAL at 08:34

## 2018-03-18 RX ADMIN — HYDROMORPHONE HYDROCHLORIDE 4 MG: 2 TABLET ORAL at 16:46

## 2018-03-18 NOTE — PROGRESS NOTES
Hospitalist Progress Note    NAME: Aniceto Gannon   :  1965   MRN:  [id number]   LOS:   5      Assessment / Plan:  Chronic left foot ulcer of unclear etiology, severe pain in setting of abnormal skin changes of left leg  -appreciate ortho evaluation s/p debridement  -MRI done no evidence of acute osteo  -appreciate ID evaluation, less likely acutely infected, cultures negative ESR normal  -patient had extensive vascular evaluation, EUGENE negatives, multiple vascular surgeons have evaluated patient had stated that there was no indication for any type of procedure  -patient does not have diabetes, hba1c normal  -greatly appreciate rheumatology evaluation, consider morphea, lipodermatosclerosis  -f/u labs ordered for work up  -will hold off on MRI of thigh for now as patient requires conscious sedation for MRI and had MRI of foot few days ago  -will consult general surgery regarding biopsy  -counseled patient on smoking cessation to help wound healing  -patient can barely walk due to pain, will order PT/OT evaluation will likely see patient tomorrow    Chronic pain on methadone  -called patient's methadone clinic [facility], confirmed patient's daily dose of methadone is 100 mg   -ordered patient's home dose daily discussed with pharmacy  -dilaudid PO PRN breakthrough pain    Hypertension  -lisinopril    HLD  -not on medication    Gout  -allopurinol    Hypothyroidism  -synthroid, TSH mildly elevated T4 normal    Bladder mass  -plan for outpatient follow up    Tobacco use  -nicotine patch prn    Code status: Full  Prophylaxis: Hep SQ  Recommended Disposition: pending hospital course and PT eval     Subjective:     Chief Complaint: foot pain  Pain the same, patient states he is having difficulty bearing weight due to pain however nurses states he has been walking and up in his room okay    Objective:     VITALS:   Last 24hrs VS reviewed since prior progress note.  Most recent are:  Patient Vitals for the past 24 hrs:   Temp Pulse Resp BP SpO2   03/18/18 1104 98 °F (36.7 °C) (!) 55 17 151/88 98 %   03/18/18 0810 97.4 °F (36.3 °C) 64 12 140/79 99 %   03/18/18 0334 96.5 °F (35.8 °C) (!) 56 16 113/82 96 %   03/18/18 0049 97.5 °F (36.4 °C) (!) 55 16 119/83 98 %   03/17/18 2044 98.4 °F (36.9 °C) (!) 56 16 153/86 97 %   03/17/18 1652 98.4 °F (36.9 °C) (!) 53 16 169/70 97 %       Intake/Output Summary (Last 24 hours) at 03/18/18 1343  Last data filed at 03/18/18 0356   Gross per 24 hour   Intake                0 ml   Output             2150 ml   Net            -2150 ml        PHYSICAL EXAM:  General: Alert, cooperative, no acute distress    EENT:  EOMI. Anicteric sclerae. Mucous membranes moist  Resp:  CTA bilaterally, no wheezing or rales. No accessory muscle use  CV:  Regular rhythm, no edema  GI:  Soft, non distended, non tender. +Bowel sounds  Neurologic:  Alert and oriented X 3, normal speech  Psych:   Good insight, not anxious nor agitated  Skin:  Dressing over wound, skin thickening on left arm and left leg, skin darker on left leg, very tender to palpation  ________________________________________________________________________  Care Plan discussed with:    Comments   Patient x    Family      RN x    Care Manager     Consultant                        Multidiciplinary team rounds were held today with , nursing, pharmacist and clinical coordinator. Patient's plan of care was discussed; medications were reviewed and discharge planning was addressed. ________________________________________________________________________  Total NON critical care TIME:  30   Minutes    >50% of visit spent in counseling and coordination of care       ________________________________________________________________________    Procedures: see electronic medical records for all procedures/Xrays and details which were not copied into this note but were reviewed prior to creation of Plan.       LABS:  I reviewed today's most current labs and imaging studies.   Pertinent labs include:  Recent Labs      03/18/18   0335  03/17/18   0323   WBC  8.9  9.1   HGB  12.0*  12.1   HCT  36.9  37.2   PLT  179  169     Recent Labs      03/18/18 0335  03/17/18 0323   NA  139  139   K  4.2  4.0   CL  104  103   CO2  31  32   GLU  98  88   BUN  14  12   CREA  0.60*  0.69*   CA  8.7  8.6       Signed: Breana Garces MD

## 2018-03-18 NOTE — PROGRESS NOTES
Patient passed a small, nickel sized clot while urinating 1200 this afternoon. In further questioning patient he states he has never passed a clot before. He states that he is aware he has a tumor in his bladder. He states that he was told by Dr. aKsia Ruelas that he had a tumor and that it needed to be addressed immediately. He stated he didn't like the way the doctor explained it to him so he never followed up or got a second opinion. He said he was told again during a recent MRI that incidentally a mass was seen in his bladder and he again did not follow up because he had other stuff to worry about. Patient now is worried that something is wrong. Subsequent urination this evening has been of a cranberry color, volume 550ml.

## 2018-03-18 NOTE — PROGRESS NOTES
Phoned MD with patients concerns and received orders from MD to start [place] and continuous bladder irrigation for repeated bloody urine specimens. In explaining orders to patient, he states we aren't sticking anything inside of him, he will get it straight with the doctor in the morning and will get us up to speed with his condition.    [place] not placed as per patients refusal

## 2018-03-19 LAB
ANA SER QL: POSITIVE
ANA TITR SER IF: NEGATIVE {TITER}
ANTICHROMATIN ABS, ACHRLT: <0.2 AI (ref 0–0.9)
DSDNA AB SER-ACNC: 1 IU/ML (ref 0–9)
ENA SCL70 AB SER-ACNC: <0.2 AI (ref 0–0.9)
ENA SM AB SER-ACNC: <0.2 AI (ref 0–0.9)
ENA SM+RNP AB SER-ACNC: <0.2 AI (ref 0–0.9)
GLUCOSE BLD STRIP.AUTO-MCNC: 150 MG/DL (ref 65–100)
RNP ABS, RNPRLT: 3.4 AI (ref 0–0.9)
SEE BELOW:, 164879: ABNORMAL
SERVICE CMNT-IMP: ABNORMAL

## 2018-03-19 PROCEDURE — 97112 NEUROMUSCULAR REEDUCATION: CPT

## 2018-03-19 PROCEDURE — 97535 SELF CARE MNGMENT TRAINING: CPT

## 2018-03-19 PROCEDURE — 97161 PT EVAL LOW COMPLEX 20 MIN: CPT

## 2018-03-19 PROCEDURE — 74011250637 HC RX REV CODE- 250/637: Performed by: EMERGENCY MEDICINE

## 2018-03-19 PROCEDURE — 74011000250 HC RX REV CODE- 250: Performed by: INTERNAL MEDICINE

## 2018-03-19 PROCEDURE — 65660000000 HC RM CCU STEPDOWN

## 2018-03-19 PROCEDURE — G8987 SELF CARE CURRENT STATUS: HCPCS

## 2018-03-19 PROCEDURE — 97167 OT EVAL HIGH COMPLEX 60 MIN: CPT

## 2018-03-19 PROCEDURE — 97530 THERAPEUTIC ACTIVITIES: CPT

## 2018-03-19 PROCEDURE — 74011250637 HC RX REV CODE- 250/637: Performed by: INTERNAL MEDICINE

## 2018-03-19 PROCEDURE — G8988 SELF CARE GOAL STATUS: HCPCS

## 2018-03-19 PROCEDURE — 82962 GLUCOSE BLOOD TEST: CPT

## 2018-03-19 RX ORDER — HYDROMORPHONE HYDROCHLORIDE 2 MG/1
2 TABLET ORAL
Status: DISCONTINUED | OUTPATIENT
Start: 2018-03-19 | End: 2018-03-26 | Stop reason: HOSPADM

## 2018-03-19 RX ORDER — LIDOCAINE HYDROCHLORIDE 20 MG/ML
JELLY TOPICAL AS NEEDED
Status: DISCONTINUED | OUTPATIENT
Start: 2018-03-19 | End: 2018-03-26 | Stop reason: HOSPADM

## 2018-03-19 RX ADMIN — PREGABALIN 150 MG: 150 CAPSULE ORAL at 18:00

## 2018-03-19 RX ADMIN — Medication 10 ML: at 08:14

## 2018-03-19 RX ADMIN — HYDROMORPHONE HYDROCHLORIDE 4 MG: 2 TABLET ORAL at 07:07

## 2018-03-19 RX ADMIN — LIDOCAINE HYDROCHLORIDE: 20 JELLY TOPICAL at 17:30

## 2018-03-19 RX ADMIN — PREGABALIN 150 MG: 150 CAPSULE ORAL at 08:13

## 2018-03-19 RX ADMIN — Medication 10 ML: at 07:08

## 2018-03-19 RX ADMIN — ALLOPURINOL 100 MG: 100 TABLET ORAL at 08:13

## 2018-03-19 RX ADMIN — Medication 10 ML: at 15:11

## 2018-03-19 RX ADMIN — LEVOTHYROXINE SODIUM 75 MCG: 25 TABLET ORAL at 07:08

## 2018-03-19 RX ADMIN — Medication 10 ML: at 07:09

## 2018-03-19 RX ADMIN — METHADONE HYDROCHLORIDE 100 MG: 10 CONCENTRATE ORAL at 08:13

## 2018-03-19 RX ADMIN — LISINOPRIL 20 MG: 20 TABLET ORAL at 08:13

## 2018-03-19 RX ADMIN — HYDROMORPHONE HYDROCHLORIDE 4 MG: 2 TABLET ORAL at 19:13

## 2018-03-19 RX ADMIN — Medication 10 ML: at 15:10

## 2018-03-19 RX ADMIN — HYDROMORPHONE HYDROCHLORIDE 4 MG: 2 TABLET ORAL at 15:10

## 2018-03-19 RX ADMIN — HYDROMORPHONE HYDROCHLORIDE 4 MG: 2 TABLET ORAL at 01:04

## 2018-03-19 RX ADMIN — Medication 10 ML: at 23:17

## 2018-03-19 RX ADMIN — HYDROMORPHONE HYDROCHLORIDE 4 MG: 2 TABLET ORAL at 23:16

## 2018-03-19 NOTE — PROGRESS NOTES
Problem: Falls - Risk of  Goal: *Absence of Falls  Document Jordon Fall Risk and appropriate interventions in the flowsheet.    Outcome: Progressing Towards Goal  Fall Risk Interventions:  Mobility Interventions: Patient to call before getting OOB         Medication Interventions: Teach patient to arise slowly         History of Falls Interventions: Bed/chair exit alarm

## 2018-03-19 NOTE — PROGRESS NOTES
Hospitalist Progress Note    NAME: Maria Isabel Current   :  1965   MRN:  [id number]   LOS:   6      Assessment / Plan:  Chronic left foot ulcer of unclear etiology, severe pain in setting of abnormal skin changes of left leg  -appreciate ortho evaluation s/p debridement  -MRI done no evidence of acute osteo  -appreciate ID evaluation, less likely acutely infected, cultures negative ESR normal  -patient had extensive vascular evaluation, EUGENE negatives, multiple vascular surgeons have evaluated patient had stated that there was no indication for any type of procedure  -patient does not have diabetes, hba1c normal  -greatly appreciate Dr. Manuel Simeon rheumatology evaluation, consider morphea, lipodermatosclerosis  -f/u labs ordered for work up -- rec MRI of leg and biopsy  -appreciate Dr. Sanjana Roberson from general surgery seeing patient regarding biopsy  -can consider MRI however patient has high anxiety and required conscious sedation earlier this hospitalization  -counseled patient on smoking cessation to help wound healing  -patient can barely walk due to pain, will order PT/OT evaluation recommending SNF    Hematuria  Known bladder mass  -patient began having hematuria yesterday, improved after stopping heparin and aspirin  -recommended [place] however patient refused despite discussing benefits and risks  -recommended urology consult however patient has been fired from [facility]  and he refuses to see anyone in the practice  -has plan for procedure with Dr. Tamara Gill from [facility]    Chronic pain on methadone  -called patient's methadone clinic [facility], confirmed patient's daily dose of methadone is 100 mg   -ordered patient's home dose daily discussed with pharmacy  -dilaudid PO PRN breakthrough pain    Hypertension  -lisinopril    HLD  -not on medication    Gout  -allopurinol    Hypothyroidism  -synthroid, TSH mildly elevated T4 normal        Tobacco use  -nicotine patch prn    Code status: Full  Prophylaxis: none due to hematuria   Recommended Disposition: pending hospital course and PT eval     Subjective:     Chief Complaint: foot pain  Hematuria began yesterday, recommended [place] however patient refused  Per nursing hematuria has improved  Patient was to have procedure done at [facility] by his urologist today      Objective:     VITALS:   Last 24hrs VS reviewed since prior progress note. Most recent are:  Patient Vitals for the past 24 hrs:   Temp Pulse Resp BP SpO2   03/19/18 1141 97.9 °F (36.6 °C) (!) 56 18 126/77 95 %   03/19/18 0737 97.8 °F (36.6 °C) (!) 54 18 118/74 95 %   03/19/18 0518 97.9 °F (36.6 °C) 62 18 133/73 97 %   03/19/18 0050 97.8 °F (36.6 °C) (!) 55 17 135/77 97 %   03/18/18 2030 98 °F (36.7 °C) 68 16 131/67 96 %       Intake/Output Summary (Last 24 hours) at 03/19/18 1640  Last data filed at 03/19/18 7594   Gross per 24 hour   Intake                0 ml   Output             1850 ml   Net            -1850 ml        PHYSICAL EXAM:  General: Alert, cooperative, no acute distress    EENT:  EOMI. Anicteric sclerae. Mucous membranes moist  Resp:  CTA bilaterally, no wheezing or rales. No accessory muscle use  CV:  Regular rhythm, no edema  GI:  Soft, non distended, non tender. +Bowel sounds  Neurologic:  Alert and oriented X 3, normal speech  Psych:   Good insight, not anxious nor agitated  Skin:  Dressing over wound, skin thickening on left arm and left leg, skin darker on left leg, very tender to palpation  ________________________________________________________________________  Care Plan discussed with:    Comments   Patient x    Family  x Caregiver Leo Jolley RN x    Care Manager     Consultant                        Multidiciplinary team rounds were held today with , nursing, pharmacist and clinical coordinator. Patient's plan of care was discussed; medications were reviewed and discharge planning was addressed. ________________________________________________________________________  Total NON critical care TIME:  30   Minutes    >50% of visit spent in counseling and coordination of care       ________________________________________________________________________    Procedures: see electronic medical records for all procedures/Xrays and details which were not copied into this note but were reviewed prior to creation of Plan. LABS:  I reviewed today's most current labs and imaging studies.   Pertinent labs include:  Recent Labs      03/18/18   1930  03/18/18   0335  03/17/18   0323   WBC  11.2*  8.9  9.1   HGB  12.7  12.0*  12.1   HCT  39.6  36.9  37.2   PLT  222  179  169     Recent Labs      03/18/18   0335  03/17/18   0323   NA  139  139   K  4.2  4.0   CL  104  103   CO2  31  32   GLU  98  88   BUN  14  12   CREA  0.60*  0.69*   CA  8.7  8.6       Signed: Charis Jacobs MD

## 2018-03-19 NOTE — PROGRESS NOTES
General Surgery End of Shift Nursing Note    Bedside shift change report given to Vanessa (oncoming nurse) by Vero Fish RN offgoing nurse). Report included the following information SBAR, Kardex, Intake/Output and MAR. Shift worked:   [id number]   Summary of shift:   C/o gout in the  Left arm called Hospitalist and obtained order for Colchicine. Issues for physician to address: None     Number times ambulated in hallway past shift: 0    Number of times OOB to chair past shift: 0    Pain Management:  Current medication: yes  Patient states pain is manageable on current pain medication: YES    GI:    Current diet:  DIET REGULAR  DIET ONE TIME MESSAGE    Tolerating current diet: YES  Passing flatus: YES  Last Bowel Movement: several days ago   Appearance: Unobserved    Respiratory:    Incentive Spirometer at bedside: YES  Patient instructed on use: YES    Patient Safety:    Falls Score: 1  Bed Alarm On? No  Sitter?  No    Sheryl Norton

## 2018-03-19 NOTE — CONSULTS
Rheumatology    Above discussion noted, MRI delayed, looking into Bx. All my labs I ordered are pending. Review of old labs reveal direct MARLINE +, RNP >8.0. Will await the final verdict of these results when MARLINE-IFA is resulted.

## 2018-03-19 NOTE — PROGRESS NOTES
Problem: Mobility Impaired (Adult and Pediatric)  Goal: *Acute Goals and Plan of Care (Insert Text)  Physical Therapy Goals  Initiated 3/19/2018  1. Patient will move from supine to sit and sit to supine  in bed with independence within 7 day(s). 2.  Patient will transfer from bed to chair and chair to bed with minimal assistance/contact guard assist using the least restrictive device within 7 day(s). 3.  Patient will perform sit to stand with minimal assistance/contact guard assist within 7 day(s). 4.  Patient will ambulate with minimal assistance/contact guard assist for 25 feet with the least restrictive device within 7 day(s). physical Therapy EVALUATION  Patient: Ki Richmond (84 y.o. male)  Date: 3/19/2018  Primary Diagnosis: Left foot infection  Chronic pain        Precautions:   WBAT , Falls    ASSESSMENT :  Based on the objective data described below, the patient presents with decreased functional mobility, impaired balance, increased pain limiting tolerance to therapy and OOB progression. Patient received supine in bed and agreeable to therapy. Patient not forthcoming with prior home set up. Per previous admission in December patient was living in hotels and friend's homes. Patient reported he ambulates with a cane prior to admission. Patient with L hemiparetic UE/LE. Patient had received pain medication ~45min-1 hour prior to evaluation. Patient completed supine to sit with min assist, use of bed railing and increased time and effort. Patient encouraged to perform LE ROM seated edge of bed, but patient with increased pain with LLE knee ROM. Patient refused sit to stand due to increased pain and stated \"I know I can't do it. \" But then patient reported he was able to \"drag myself to the toilet earlier. \" Patient continued to refuse to mobilize OOB. Patient will continue to benefit from PT to progress mobility as tolerated and reach highest level of independence.  At this time, pt will likely benefit from SNF placement upon discharge. Patient will benefit from skilled intervention to address the above impairments. Patients rehabilitation potential is considered to be Fair  Factors which may influence rehabilitation potential include:   []         None noted  []         Mental ability/status  [x]         Medical condition  []         Home/family situation and support systems  []         Safety awareness  [x]         Pain tolerance/management  []         Other:      PLAN :  Recommendations and Planned Interventions:  [x]           Bed Mobility Training             [x]    Neuromuscular Re-Education  [x]           Transfer Training                   []    Orthotic/Prosthetic Training  [x]           Gait Training                         []    Modalities  [x]           Therapeutic Exercises           []    Edema Management/Control  [x]           Therapeutic Activities            [x]    Patient and Family Training/Education  []           Other (comment):    Frequency/Duration: Patient will be followed by physical therapy  3 times a week to address goals. Discharge Recommendations: Skilled Nursing Facility  Further Equipment Recommendations for Discharge: TBD     SUBJECTIVE:   Patient stated It hurts too much to stand on it. I don't want to waste the pain medication that I just got.     OBJECTIVE DATA SUMMARY:   HISTORY:    Past Medical History:   Diagnosis Date    Aneurysm ([facility]     (with stroke)    Gout     Hypercholesterolemia     Hypertension     Lesion of bladder     Se[facility] ([facility]     [facility] 2006    left sided weakness    [facility]us [facility]     Thyroid disease     TIA (transient ischemic attack) 2012     Past Surgical History:   Procedure Laterality Date    HX ORTHOPAEDIC      KNEE ARTHROSCP HARV      left knee     Prior Level of Function/Home Situation: see aboe  Personal factors and/or comorbidities impacting plan of care:     Home Situation  Home Environment: Private residence  One/Two Story Residence: One story  Living Alone: Yes  Support Systems: Friends \ neighbors, Family member(s)  Patient Expects to be Discharged to[de-identified] Private residence  Current DME Used/Available at Home: [address] [facility] [place]    EXAMINATION/PRESENTATION/DECISION MAKING:   Critical Behavior:  Neurologic State: Alert, Appropriate for age  Orientation Level: Oriented X4  Cognition: Appropriate decision making     Hearing: Auditory  Auditory Impairment: None  Skin:    Edema:   Range Of Motion:  AROM: Within functional limits (except LUE/LLE hemiparetic)                       Strength:    Strength: Within functional limits (except LUE/LLE hemiparietic)                    Tone & Sensation:   Tone: Abnormal (LUE/LLE)      Sensation: Impaired    Functional Mobility:  Bed Mobility:     Supine to Sit: Minimum assistance     Scooting: Contact guard assistance  Transfers:  Sit to Stand:  (NT--pt refused)        Balance:   Sitting: Impaired  Sitting - Static: Good (unsupported)  Sitting - Dynamic: Fair (occasional)  Standing:  (NT--pt refused)  Ambulation/Gait Training:             Functional Measure:  Timed up and go:    Timed Get Up And Go Test:  (>13 seconds)     Timed Up and Go and G-code impairment scale:  Percentage of Impairment CH    0%   CI    1-19% CJ    20-39% CK    40-59% CL    60-79% CM    80-99% CN     100%   Timed   Score 0-56 10 11-12 13-14 15-16 17-18 19 20       < than 10 seconds=Normal  Greater then 13.5 seconds (in elderly)=Increased fall risk   Rodriguez EDOUARD, Naheed Langston. Predicting the probability for falls in community dwelling older adults using the Timed Up and Go Test. Phys Ther. 2000;80:896-903. G codes: In compliance with CMSs Claims Based Outcome Reporting, the following G-code set was chosen for this patient based on their primary functional limitation being treated:     The outcome measure chosen to determine the severity of the functional limitation was the TUG with a score of >13 seconds which was correlated with the impairment scale. ? Mobility - Walking and Moving Around:     - CURRENT STATUS: CN - 100% impaired, limited or restricted    - GOAL STATUS: CM - 80%-99% impaired, limited or restricted    - D/C STATUS:  ---------------To be determined---------------       Based on the above components, the patient evaluation is determined to be of the following complexity level: LOW     Pain:                    Activity Tolerance:   Fair. Limited by pain  Please refer to the flowsheet for vital signs taken during this treatment. After treatment:   []         Patient left in no apparent distress sitting up in chair  [x]         Patient left in no apparent distress in bed  [x]         Call bell left within reach  [x]         Nursing notified  []         Caregiver present  []         Bed alarm activated    COMMUNICATION/EDUCATION:   The patients plan of care was discussed with: Occupational Therapist and Registered Nurse. [x]         Fall prevention education was provided and the patient/caregiver indicated understanding. [x]         Patient/family have participated as able in goal setting and plan of care. [x]         Patient/family agree to work toward stated goals and plan of care. []         Patient understands intent and goals of therapy, but is neutral about his/her participation. []         Patient is unable to participate in goal setting and plan of care.     Thank you for this referral.  Chau Baxter, PT, DPT   Time Calculation: 19 mins

## 2018-03-19 NOTE — CONSULTS
Surgery Consult  Consulted by: Dr. Garo Dunne  PCP: Char Najera MD    Thank you for allowing me to participate in your patient's care. Please call me with any questions. Assessment:   LLE non-healing wound. Rash. Morphea in ddx. Plan:   Skin and SC soft tissue biopsies requested. Available to do the biopsies Tuesday or Wednesday. Not sure if MRI of thigh is going to happen so will start with the superficial biopsies. Per Dr. Mark Judd note \"All biopsy specimens should include subcutaneous fat. A punch biopsy that extends into the subcutaneous fat can be performed in superficial lesions of morphea. In cases of deep involvement, a punch biopsy is unlikely to reach sufficient depth, and an incisional biopsy should be performed. The biopsy specimen can be taken from the inflammatory or indurated border if present, or from a central sclerotic area. \"     Discussed the risk of surgery including bleeding, infection, injury to adjacent structures, and the risks of general anesthetic. The patient understands the risks; any and all questions were answered to the patient's satisfaction. Subjective:      Shayy Riley is a 47 y.o. male who is seen in consultation at the request of Dr. Garo Dunne for possible skin biopsy. Patient has long-standing left sided \"neuropathic\" pain after stroke. He has a non-healing left foot wound. No diabetes and w/u for PAD etiology negative. Objective:   Blood pressure 126/77, pulse (!) 56, temperature 97.9 °F (36.6 °C), resp. rate 18, height 6' 1\" (1.854 m), weight 100.4 kg (221 lb 5.5 oz), SpO2 95 %.   Temp (24hrs), Av.9 °F (36.6 °C), Min:97.8 °F (36.6 °C), Max:98 °F (36.7 °C)      Physical Exam:  PHYSICAL EXAM:  Gen:  A&O    HEENT:   [x]     anicteric    []      scleral icterus    [x]     moist mucosa     []     dry mucosa    RESP:   [x]     CTA bilaterally, no wheezing/rhonchi/rales/crackles    []     wheezing     []     rhonchi     []     crackles     []     use of accessory muscles    CARD:  [x]     regular rate and rhythm     [x]     No murmurs/rubs/gallops    []     irregular rhythm     []     Murmur     []     Rubs     []     Gallops    ABD:     []  soft  [x]     non distended  [x]     non tender  [x]      NABS     SKIN:   Rash LLQ. Dressing covering left foot - not removed. Past Medical History:   Diagnosis Date    [facility] ([facility]     (with stroke)    Gout     Hypercholesterolemia     Hypertension     Lesion of bladder     Seizures ([facility]     Stroke ([facility] 2006    left sided weakness    Thromboembolus ([facility]     Thyroid disease     TIA (transient ischemic attack) 2012     Past Surgical History:   Procedure Laterality Date    HX ORTHOPAEDIC      KNEE ARTHROSCP HARV      left knee      Family History   Problem Relation Age of Onset    Diabetes Father     Diabetes Sister     Diabetes Brother      Social History     Social History    Marital status:      Spouse name: N/A    Number of children: N/A    Years of education: N/A     Social History Main Topics    Smoking status: Current Every Day Smoker     Packs/day: 1.00    Smokeless tobacco: Never Used    Alcohol use 8.4 oz/week     14 Cans of beer per week    Drug use: Yes     Special: Prescription    Sexual activity: Yes     Other Topics Concern    None     Social History Narrative      Prior to Admission medications    Medication Sig Start Date End Date Taking? Authorizing Provider   aspirin delayed-release 81 mg tablet Take 81 mg by mouth daily. Yes Historical Provider   lidocaine-prilocaine (EMLA) topical cream Apply  to affected area three (3) times daily as needed for Pain. Patient applies a 'generous amount' to wound on top of left foot. Yes Historical Provider   pregabalin (LYRICA) 75 mg capsule Take 150 mg by mouth two (2) times a day. Yes Historical Provider   ibuprofen (ADVIL) 200 mg tablet Take 1,200 mg by mouth daily as needed for Pain.    Yes Historical Provider METHADONE HCL (METHADONE PO) Take 95 mg by mouth daily. Patient takes liquid methadone. Yes Historical Provider   colchicine (COLCRYS) 0.6 mg tablet Take 1 Tab by mouth daily as needed. For gout flare 1/12/18  Yes Tammy Kay MD   allopurinol (ZYLOPRIM) 100 mg tablet Take 1 Tab by mouth daily. 1/12/18  Yes Tammy Kay MD   levothyroxine (SYNTHROID) 75 mcg tablet Take 1 Tab by mouth Daily (before breakfast). 1/12/18  Yes Tammy Kay MD   lisinopril (PRINIVIL, ZESTRIL) 20 mg tablet Take 1 Tab by mouth daily.  1/12/18  Yes Tammy Kay MD     ALLERGIES:    Allergies   Allergen Reactions    Sulfamethoxazole-Trimethoprim Rash     L eye and L hand    Ciprofloxacin Hives     Per pcp records    Codeine Hives     Tolerates dilaudid, oxycodone    Lyrica [Pregabalin] Hives    Sulfa (Sulfonamide Antibiotics) Rash    Ultram [Tramadol] Hives       Review of Systems:  (unchecked were asked but negative)  Constitutional: [] Fever/chills   [] Sweats   [] Loss of appetite   [] Fatigue/weakness   [] Weight loss  Head & Neck:   [] Headaches   [] Visual loss   [] Hearing loss   [] Thyroid problems  Cardiovascular:   [x] Stroke   [] Calf pain when walking   [] Chest pain   [] Rapid heart beat       [] Heart attack   [] Stents   [] Congestive heart failure   [] Leg swelling   [] Murmur  Respiratory:    [] Sleep apnea   [] Cough/congestion   [] Shortness of breath   [] Wheezing/asthma      [] COPD/emphysema  Gastrointestinal:   [] Frequent indigestion   [] Trouble swallowing   [] Nausea   [] Vomiting   [] Bloating   [] Abd pain       [] Diarrhea   [] Constipation   [] Blood in stool   [] Ulcers   [] Intestinal disease   [] Hepatitis    Genitourinary:   [] Painful urination   [] Difficulty urinating   [] Frequent urination       [] Enlarged prostate   [] Vasectomy   [] Blood in urine   [] Dialysis  Musculoskeletal:  [x] Muscle aches   [x] Back pain   [x] Joint pain  Neurologic:    [] Seizures   [] Dizziness   [] Numbness  Hematologic:   [] Nosebleeds   [] Easy bruising   [] Anemia   [] Easy bleeding  Psychiatric:    [] Depression   [] Anxiety   [] Bipolar disorder   [] Schizophrenia                                  []     Unable to obtain  ROS due to  []     mental status change  []     sedated   []     intubated    LABS:  Recent Labs      03/18/18 1930 03/18/18   0335   WBC  11.2*  8.9   HGB  12.7  12.0*   HCT  39.6  36.9   PLT  222  179     Recent Labs      03/18/18 0335  03/17/18   0323   NA  139  139   K  4.2  4.0   CL  104  103   CO2  31  32   BUN  14  12   CREA  0.60*  0.69*   GLU  98  88   CA  8.7  8.6     No results for input(s): SGOT, GPT, AP, TBIL, TP, ALB, GLOB, GGT, AML, LPSE in the last 72 hours. No lab exists for component: AMYP, HLPSE  No results for input(s): INR, PTP, APTT in the last 72 hours.     No lab exists for component: INREXT      Signed By: Robyn Fischer MD     March 19, 2018

## 2018-03-19 NOTE — PROGRESS NOTES
Problem: Self Care Deficits Care Plan (Adult)  Goal: *Acute Goals and Plan of Care (Insert Text)  Occupational Therapy Goals  Initiated 3/19/2018  1. Patient will perform bathing with minimal assistance/contact guard assist within 7 day(s). 2.  Patient will perform lower body dressing with moderate assistance  within 7 day(s). 3.  Patient will perform standing for 3:00 for functional task with L UE platform RW with minimal assistance/contact guard assist within 7 day(s). 4.  Patient will perform toilet transfers with minimal assistance/contact guard assist within 7 day(s). 5.  Patient will perform all aspects of toileting with minimal assistance/contact guard assist within 7 day(s). 6.  Patient will participate in upper extremity therapeutic exercise/activities with minimal assistance/contact guard assist for 5 minutes within 7 day(s). 7.  Patient will utilize energy conservation techniques and will VERBALIZE SAFE TRANSFER strategies during functional activities with verbal cues within 7 day(s). Occupational Therapy EVALUATION  Patient: Hoda Leblanc (89 y.o. male)  Date: 3/19/2018  Primary Diagnosis: Left foot infection  Chronic pain        Precautions: L HP, L foot wound  WBAT with surgical shoe    ASSESSMENT :  Based on the objective data described below, the patient presents with chronic L HP with L foot infection that hurts in foot and up through L leg, chronic pain, decreased ADL, safety, I-ADL, transfers, functional mobility, bed mobility,sitting and standing balance. Patient is max A of 1-2 for transfer EOB to chair back to EOB with impulsivity and performing transfer \"his way\" despite cues with posterior trunk and uncontrolled descent to bed/chair despite cues. He states he has a roommate that is unavailable to assist him in any way at discharge. He was going to pain clinic frequently, and has a ride to clinic, is very limiting in information provided to questions.  He was mod I at [facility] for ADl and selective I-ADl until recently with L foot wound. He is currently D to s/u for simple ADL, max A of 1-2 for transfers due to PAIN and inability to WB on L LE despite WBAT order. PATIENT WOULD BENEFIT FROM TRIAL WITH L PLATFORM RW TO SEE IF HE WOULD BE ABLE TO COMPENSATE WITH L FOREARM WB AS HE CURRENTLY CANNOT TOLERATE LLE WB DUE TO PAIN--HE IS AGREEABLE TO TRIAL. Currently recommend rehab at this time. .    Patient will benefit from skilled intervention to address the above impairments. Patients rehabilitation potential is considered to be Good  Factors which may influence rehabilitation potential include:   []             None noted  []             Mental ability/status  [x]             Medical condition  []             Home/family situation and support systems  [x]             Safety awareness  [x]             Pain tolerance/management  []             Other:      PLAN :  Recommendations and Planned Interventions:  [x]               Self Care Training                  [x]        Therapeutic Activities  [x]               Functional Mobility Training    []        Cognitive Retraining  [x]               Therapeutic Exercises           [x]        Endurance Activities  [x]               Balance Training                   [x]        Neuromuscular Re-Education  []               Visual/Perceptual Training     [x]   Home Safety Training  [x]               Patient Education                 [x]        Family Training/Education  []               Other (comment):    Frequency/Duration: Patient will be followed by occupational therapy 4 times a week to address goals. Discharge Recommendations: Rehab  Further Equipment Recommendations for Discharge: TBD, rec TTB and trial of L UE platform RW (here in acute care)     SUBJECTIVE:   Patient stated I can't put weight on it--it hurts too bad.     OBJECTIVE DATA SUMMARY:   HISTORY:   Past Medical History:   Diagnosis Date    Aneurysm ([address]     (with stroke)    Gout     Hypercholesterolemia     Hypertension     Lesion of bladder     [facility]     Stroke [facility]) 2006    left sided weakness    Th[facility]boembolus ([facility]     Thyroid disease     TIA (transient ischemic attack) 2012     Past Surgical History:   Procedure Laterality Date    HX ORTHOPAEDIC      KNEE ARTHROSCP HARV      left knee       Prior Level of Function/Environment/Context: see above  Occupations in which the patient is/was successful, what are the barriers preventing that success: pain is a barrier  Performance Patterns (routines, roles, habits, and rituals):   Personal Interests and/or values:   Expanded or extensive additional review of patient history:     Home Situation  Home Environment: Private residence  # Steps to Enter: 3  Rails to Enter: No  One/Two Story Residence: One story  Living Alone: No (other person cannot help him)  Support Systems: None  Patient Expects to be Discharged to[de-identified] Skilled nursing facility  Current DME Used/Available at Home: pari [employer] chair  Tub or Shower Type: Tub/Shower combination  [x]  Right hand dominant   []  Left hand dominant    EXAMINATION OF PERFORMANCE DEFICITS:  Cognitive/Behavioral Status:                 Safety/Judgement: Decreased awareness of need for assistance;Decreased awareness of need for safety; Insight into deficits (can be impulsive with movements)        Hearing:   Auditory  Auditory Impairment: None    Vision/Perceptual:                         No c/o            Range of Motion:  L UE, can depress scapula  AROM: Grossly decreased, non-functional (L UE, R UE WFL)                         Strength:  R UE WFL  Strength: Grossly decreased, non-functional (L UE spasticity/HP, R UE WFL)                Coordination:     Fine Motor Skills-Upper: Left Impaired;Right Intact    Gross Motor Skills-Upper: Left Impaired;Right Intact    Tone & Sensation:    Tone: Abnormal (L UE hypertonicity, L hand contracture)  Sensation: Impaired (L entire side, hypersensitive to touch, )                      Balance:  Sitting: Impaired; Without support  Sitting - Static: Good (unsupported)  Sitting - Dynamic: Fair (occasional)  Standing: Impaired; With support  Standing - Static: Poor  Standing - Dynamic : Poor    Functional Mobility and Transfers for ADLs:  Bed Mobility:  Supine to Sit: Moderate assistance  Sit to Supine: Stand-by assistance  Scooting: Contact guard assistance    Transfers:  Sit to Stand: Moderate assistance;Assist x1  Stand to Sit: Maximum assistance;Assist x1 (posterior trunk lean/uncontrolled stand to sit)  Bed to Chair: Maximum assistance;Assist x1;Additional time  Toilet Transfer :  (NOT SAFE with posterior trunk lean (to [facility]))    ADL Assessment:  Feeding: Setup    Oral Facial Hygiene/Grooming: Setup    Bathing: Moderate assistance    Upper Body Dressing: Minimum assistance    Lower Body Dressing: Maximum assistance    Toileting: Maximum assistance              Neuro-Perry:   L UE--educated on prolonged stretch for 30 secs or greater to each joint, can depress L scapula for potential L UE platform RW trial (see A above)  ADL Intervention and task modifications:                    Provided 2 5\" pieces of red cylindrical foam for L hand contracture--provided handout to obtain thru [employer], educated on hygiene to keep L hand dry with cylindrical foam (he used this before and lost his piece in the ER per his report). Rec TTB use for home (defer to d/c facility to obtain)    MAX and constant cues for transfer from EOB to chair to EOB with max A of 1-2 (needed 2nd for safety), severe posterior lean of trunk with transfer to bed and chair, unable to elevate hips, poor anterior ws over R LE, poor and very limited L foot WB due to pain.          Lower Body Dressing Assistance  Shoes with Velcro: Minimum assistance  Leg Crossed Method Used: Yes  Position Performed: Seated edge of bed    Toileting  Bladder Hygiene: Minimum assistance (FROM EOB)  Clothing Management: Moderate assistance (IN BED.)    Cognitive Retraining  Safety/Judgement: Decreased awareness of need for assistance;Decreased awareness of need for safety; Insight into deficits (can be impulsive with movements)    Therapeutic Exercise:    Functional Measure:  Barthel Index:    Bathin  Bladder: 5  Bowels: 10  Groomin  Dressin  Feedin  Mobility: 0  Stairs: 0  Toilet Use: 0  Transfer (Bed to Chair and Back): 5  Total: 35       Barthel and G-code impairment scale:  Percentage of impairment CH  0% CI  1-19% CJ  20-39% CK  40-59% CL  60-79% CM  80-99% CN  100%   Barthel Score 0-100 100 99-80 79-60 59-40 20-39 1-19   0   Barthel Score 0-20 20 17-19 13-16 9-12 5-8 1-4 0      The Barthel ADL Index: Guidelines  1. The index should be used as a record of what a patient does, not as a record of what a patient could do. 2. The main aim is to establish degree of independence from any help, physical or verbal, however minor and for whatever reason. 3. The need for supervision renders the patient not independent. 4. A patient's performance should be established using the best available evidence. Asking the patient, friends/relatives and nurses are the usual sources, but direct observation and common sense are also important. However direct testing is not needed. 5. Usually the patient's performance over the preceding 24-48 hours is important, but occasionally longer periods will be relevant. 6. Middle categories imply that the patient supplies over 50 per cent of the effort. 7. Use of aids to be independent is allowed. Misty Lam., Barthel, D.W. ([id number]). Functional evaluation: the Barthel Index. [facility] (14)2. Daleen Fend balwinder [place], J.J.M.F, Marcus Robins., Alfreda Cole., JennyEncompass Health Rehabilitation Hospital of [facility], [address] [facility] Hospital[facility] (). Measuring the change indisability after inpatient rehabilitation; comparison of the responsiveness of the Barthel Index and Functional Grady Measure.  Journal of Neurology, Neurosurgery, and Psychiatry, 66(4), 403-270. DORCAS Cohen, ELAENOR Hines, & Damaris Hsieh M.A. (2004.) Assessment of post-stroke quality of life in cost-effectiveness studies: The usefulness of the Barthel Index and the EuroQoL-5D. Quality of Life Research, 13, 789-84       G codes: In compliance with CMSs Claims Based Outcome Reporting, the following G-code set was chosen for this patient based on their primary functional limitation being treated: The outcome measure chosen to determine the severity of the functional limitation was the Barthel Index with a score of 35/100 which was correlated with the impairment scale. ? Self Care:     - CURRENT STATUS: CL - 60%-79% impaired, limited or restricted    - GOAL STATUS: CK - 40%-59% impaired, limited or restricted    - D/C STATUS:  ---------------To be determined---------------     Occupational Therapy Evaluation Charge Determination   History Examination Decision-Making   HIGH Complexity : Extensive review of history including physical, cognitive and psychosocial history  HIGH Complexity : 5 or more performance deficits relating to physical, cognitive , or psychosocial skils that result in activity limitations and / or participation restrictions HIGH Complexity : Patient presents with comorbidities that affect occupational performance. Signifigant modification of tasks or assistance (eg, physical or verbal) with assessment (s) is necessary to enable patient to complete evaluation       Based on the above components, the patient evaluation is determined to be of the following complexity level: HIGH   Pain:  Pain Scale 1: Numeric (0 - 10)  Pain Intensity 1: 9  Pain Location 1: Foot  Pain Orientation 1: Left  Pain Description 1: Aching     Activity Tolerance:   Limited by pain, self limiting with recommended techniques  Please refer to the flowsheet for vital signs taken during this treatment.   After treatment:   [] Patient left in no apparent distress sitting up in chair  [x] Patient left in no apparent distress in bed  [x] Call bell left within reach  [x] Nursing notified  [] Caregiver present  [] Bed alarm activated    COMMUNICATION/EDUCATION:   The patients plan of care was discussed with: Physical Therapist and Registered Nurse. [x] Home safety education was provided and the patient/caregiver indicated understanding. [x] Patient/family have participated as able in goal setting and plan of care. [x] Patient/family agree to work toward stated goals and plan of care. [] Patient understands intent and goals of therapy, but is neutral about his/her participation. [] Patient is unable to participate in goal setting and plan of care. This patients plan of care is appropriate for delegation to [facility].     Thank you for this referral.  Ruma Dover, OTR/L  Time Calculation: 55 mins

## 2018-03-20 LAB
ALBUMIN SERPL ELPH-MCNC: 3.5 G/DL (ref 2.9–4.4)
ALBUMIN/GLOB SERPL: 1.4 {RATIO} (ref 0.7–1.7)
ALPHA1 GLOB SERPL ELPH-MCNC: 0.2 G/DL (ref 0–0.4)
ALPHA2 GLOB SERPL ELPH-MCNC: 0.9 G/DL (ref 0.4–1)
ANION GAP SERPL CALC-SCNC: 5 MMOL/L (ref 5–15)
B-GLOBULIN SERPL ELPH-MCNC: 0.8 G/DL (ref 0.7–1.3)
BACTERIA SPEC CULT: NORMAL
BUN SERPL-MCNC: 15 MG/DL (ref 6–20)
BUN/CREAT SERPL: 22 (ref 12–20)
C-ANCA TITR SER IF: NORMAL TITER
CALCIUM SERPL-MCNC: 8.3 MG/DL (ref 8.5–10.1)
CC UR VC: NORMAL
CHLORIDE SERPL-SCNC: 105 MMOL/L (ref 97–108)
CO2 SERPL-SCNC: 28 MMOL/L (ref 21–32)
CREAT SERPL-MCNC: 0.68 MG/DL (ref 0.7–1.3)
ERYTHROCYTE [DISTWIDTH] IN BLOOD BY AUTOMATED COUNT: 16.1 % (ref 11.5–14.5)
GAMMA GLOB SERPL ELPH-MCNC: 0.6 G/DL (ref 0.4–1.8)
GLOBULIN SER CALC-MCNC: 2.5 G/DL (ref 2.2–3.9)
GLUCOSE SERPL-MCNC: 115 MG/DL (ref 65–100)
HCT VFR BLD AUTO: 36.6 % (ref 36.6–50.3)
HGB BLD-MCNC: 11.8 G/DL (ref 12.1–17)
M PROTEIN SERPL ELPH-MCNC: NORMAL G/DL
MCH RBC QN AUTO: 28.7 PG (ref 26–34)
MCHC RBC AUTO-ENTMCNC: 32.2 G/DL (ref 30–36.5)
MCV RBC AUTO: 89.1 FL (ref 80–99)
MYELOPEROXIDASE AB SER IA-ACNC: <9 U/ML (ref 0–9)
NRBC # BLD: 0 K/UL (ref 0–0.01)
NRBC BLD-RTO: 0 PER 100 WBC
P-ANCA ATYPICAL TITR SER IF: NORMAL TITER
P-ANCA TITR SER IF: NORMAL TITER
PLATELET # BLD AUTO: 170 K/UL (ref 150–400)
PMV BLD AUTO: 11.3 FL (ref 8.9–12.9)
POTASSIUM SERPL-SCNC: 4 MMOL/L (ref 3.5–5.1)
PROT SERPL-MCNC: 6 G/DL (ref 6–8.5)
PROTEINASE3 AB SER IA-ACNC: <3.5 U/ML (ref 0–3.5)
RBC # BLD AUTO: 4.11 M/UL (ref 4.1–5.7)
SERVICE CMNT-IMP: NORMAL
SODIUM SERPL-SCNC: 138 MMOL/L (ref 136–145)
WBC # BLD AUTO: 8.8 K/UL (ref 4.1–11.1)

## 2018-03-20 PROCEDURE — 65270000029 HC RM PRIVATE

## 2018-03-20 PROCEDURE — 97530 THERAPEUTIC ACTIVITIES: CPT

## 2018-03-20 PROCEDURE — 74011250637 HC RX REV CODE- 250/637: Performed by: EMERGENCY MEDICINE

## 2018-03-20 PROCEDURE — 36415 COLL VENOUS BLD VENIPUNCTURE: CPT | Performed by: INTERNAL MEDICINE

## 2018-03-20 PROCEDURE — 97535 SELF CARE MNGMENT TRAINING: CPT | Performed by: OCCUPATIONAL THERAPIST

## 2018-03-20 PROCEDURE — 80048 BASIC METABOLIC PNL TOTAL CA: CPT | Performed by: INTERNAL MEDICINE

## 2018-03-20 PROCEDURE — 74011250637 HC RX REV CODE- 250/637: Performed by: INTERNAL MEDICINE

## 2018-03-20 PROCEDURE — 85027 COMPLETE CBC AUTOMATED: CPT | Performed by: INTERNAL MEDICINE

## 2018-03-20 RX ORDER — COLCHICINE 0.6 MG/1
0.6 TABLET ORAL
Status: COMPLETED | OUTPATIENT
Start: 2018-03-20 | End: 2018-03-20

## 2018-03-20 RX ORDER — METHADONE HYDROCHLORIDE 10 MG/ML
100 CONCENTRATE ORAL DAILY
Status: DISCONTINUED | OUTPATIENT
Start: 2018-03-21 | End: 2018-03-25

## 2018-03-20 RX ORDER — LIDOCAINE HYDROCHLORIDE 10 MG/ML
10 INJECTION INFILTRATION; PERINEURAL ONCE
Status: DISPENSED | OUTPATIENT
Start: 2018-03-20 | End: 2018-03-21

## 2018-03-20 RX ADMIN — LISINOPRIL 20 MG: 20 TABLET ORAL at 10:18

## 2018-03-20 RX ADMIN — Medication 10 ML: at 18:52

## 2018-03-20 RX ADMIN — HYDROMORPHONE HYDROCHLORIDE 4 MG: 2 TABLET ORAL at 04:09

## 2018-03-20 RX ADMIN — METHADONE HYDROCHLORIDE 100 MG: 10 CONCENTRATE ORAL at 10:20

## 2018-03-20 RX ADMIN — Medication 10 ML: at 07:43

## 2018-03-20 RX ADMIN — ALLOPURINOL 100 MG: 100 TABLET ORAL at 10:20

## 2018-03-20 RX ADMIN — HYDROMORPHONE HYDROCHLORIDE 4 MG: 2 TABLET ORAL at 20:12

## 2018-03-20 RX ADMIN — PREGABALIN 150 MG: 150 CAPSULE ORAL at 10:19

## 2018-03-20 RX ADMIN — COLCHICINE 0.6 MG: 0.6 TABLET, FILM COATED ORAL at 07:42

## 2018-03-20 RX ADMIN — PREGABALIN 150 MG: 150 CAPSULE ORAL at 18:51

## 2018-03-20 RX ADMIN — Medication 10 ML: at 07:42

## 2018-03-20 RX ADMIN — Medication 10 ML: at 04:09

## 2018-03-20 RX ADMIN — LEVOTHYROXINE SODIUM 75 MCG: 25 TABLET ORAL at 08:00

## 2018-03-20 NOTE — PROGRESS NOTES
Occupational Therapy Goals  Initiated 3/19/2018  1. Patient will perform bathing with minimal assistance/contact guard assist within 7 day(s). 2.  Patient will perform lower body dressing with moderate assistance  within 7 day(s). 3.  Patient will perform standing for 3:00 for functional task with L UE platform RW with minimal assistance/contact guard assist within 7 day(s). 4.  Patient will perform toilet transfers with minimal assistance/contact guard assist within 7 day(s). 5.  Patient will perform all aspects of toileting with minimal assistance/contact guard assist within 7 day(s). 6.  Patient will participate in upper extremity therapeutic exercise/activities with minimal assistance/contact guard assist for 5 minutes within 7 day(s). 7.  Patient will utilize energy conservation techniques and will VERBALIZE SAFE TRANSFER strategies during functional activities with verbal cues within 7 day(s). Occupational Therapy TREATMENT  Patient: Arelis Louie (30 y.o. male)  Date: 3/20/2018  Diagnosis: Left foot infection  Chronic pain <principal problem not specified>       Precautions: WBAT    ASSESSMENT:  Patient neutral regarding his participation in OT tx, mainly focused on the healing of his L foot and L foot pain. Although patient is appropriately concerned with the healing of his L foot and is correct that his pain issues are limiting his participation in therapy, he is inappropriately unreceptive to functional solutions to work with or around his pain issues. Patient also very impulsive during lateral bed to chair transfers, putting him at risk for falls. Overall he was supervision for bed mobility, CGA lateral transfer to chair and donned his L post op shoe with min A. Session progress limited by patient general lack interest in therapy intervention.  Will follow patient for 2 more sessions on a trial basis, however if he continues to be unreceptive to therapy intervention he will be discharged from OT services. Progression toward goals:   []       Improving appropriately and progressing toward goals  []       Improving slowly and progressing toward goals  [x]       Not making progress toward goals and plan of care will be adjusted     PLAN: Follow for 2 more session on a trial basis, and discharge if he continues to be unreceptive to therapy intervention. Patient continues to benefit from skilled intervention to address the above impairments. Continue treatment per established plan of care. Discharge Recommendations:  Skilled Nursing Facility  Further Equipment Recommendations for Discharge:  TBD     SUBJECTIVE:   Patient stated Kodi Avery thing that needs to happen is that this foot needs to heal.    OBJECTIVE DATA SUMMARY:   Cognitive/Behavioral Status:  Neurologic State: Alert  Orientation Level: Oriented X4  Cognition: Impulsive; Impaired decision making; Appropriate for age attention/concentration;Poor safety awareness        Safety/Judgement: Decreased awareness of need for assistance;Decreased awareness of need for safety  Functional Mobility and Transfers for ADLs:  Bed Mobility:  Rolling: Supervision; Additional time  Supine to Sit: Supervision; Additional time to R and L side of bed. Scooting: Supervision  Transfers:  Bed to Chair: Contact guard assistance (Lateral transfer to R side, very unsafe/impulsive). Also suggested use of noe-walker for the performance of stand pivot transfers, which will give him stability, while off loading is L foot to some extent, but patient refused. Spoke with patient extensively regarding his concerns about his L foot healing and pain issues, suggesting that he at least work toward safe transfers WC, mobilization using a WC and performance of ADLs from a WC level to allow him to achieve some improved level of function. Patient verbalized some understanding, but expressed only being interested in the healing of his foot.        Balance:  Sitting: Impaired  Sitting - Static: Good (unsupported)  Sitting - Dynamic: Good (unsupported)  Standing:  (Not tested)  ADL Intervention:   Lower Body Dressing Assistance  Shoes with Velcro: Minimum assistance (to jessy L post op shoe, seated EOB, leg crossed. )  Cues: Verbal cues provided    Cognitive Retraining  Safety/Judgement: Decreased awareness of need for assistance;Decreased awareness of need for safety    Pain:  L foot pain  Severe with weight bearing.    Some pain with foot in dependent position    Activity Tolerance:   Poor, limited by pain    After treatment:   [x] Patient left in no apparent distress sitting up in chair  [] Patient left in no apparent distress in bed  [x] Call bell left within reach  [x] Nursing notified  [] Caregiver present  [] Bed alarm activated    Zhao House OTR/L  Time Calculation: 20 mins

## 2018-03-20 NOTE — PROGRESS NOTES
Hospitalist Progress Note    NAME: Jack Norwood   :  1965   MRN:  [id number]   LOS:   7      Assessment / Plan:  Chronic left foot ulcer of unclear etiology, severe pain in setting of abnormal skin changes of left leg POA  -appreciate ortho evaluation s/p debridement  -MRI left foot done no evidence of acute osteo  -no clear infection, remains off antibiotics  -patient had extensive vascular evaluation, EUGENE negatives, multiple vascular surgeons have evaluated patient had stated that there was no indication for any type of procedure  -patient does not have diabetes, hba1c normal  -appreciate Dr. Joyce Leyva rheumatology evaluation, consider morphea, lipodermatosclerosis  -f/u labs ordered for work up   -appreciate Dr. Walls's input, ideally should get biopsy ASAP  -MRI thigh if absolutely needed, needed conscious sedation  -counseled patient on smoking cessation to help wound healing  -patient can barely walk due to pain, will order PT/OT evaluation recommending SNF       No Benefits per last admit    Hematuria  Known bladder mass  -patient began having hematuria yesterday, improved after stopping heparin and aspirin  -recommended [place] however patient refused despite discussing benefits and risks  -recommended urology consult however patient has been fired from [facility]  and he refuses to see anyone in the practice  -has plan for procedure with Dr. Ziggy Bah from [facility] at [place]  -defer work up to outpatient setting    Chronic pain on methadone  -called patient's methadone clinic [facility], confirmed patient's daily dose of methadone is 100 mg   -ordered patient's home dose daily discussed with pharmacy  -dilaudid PO PRN breakthrough pain    Hypertension  -lisinopril    HLD  -not on medication    Gout  -allopurinol    Hypothyroidism  -synthroid, TSH mildly elevated T4 normal    Tobacco use  -nicotine patch prn    Code status: Full  Prophylaxis: none due to hematuria Recommended Disposition: pending hospital course and PT eval     Subjective:     Chief Complaint:  F/u foot ulcer  Pain in left foot severe, radiated up leg now  Hematuria improved      Objective:     VITALS:   Last 24hrs VS reviewed since prior progress note. Most recent are:  Patient Vitals for the past 24 hrs:   Temp Pulse Resp BP SpO2   03/20/18 0459 98.2 °F (36.8 °C) (!) 54 18 136/89 96 %   03/19/18 2320 97.9 °F (36.6 °C) (!) 55 18 153/86 100 %   03/19/18 2041 98.1 °F (36.7 °C) 72 18 142/71 96 %   03/19/18 1141 97.9 °F (36.6 °C) (!) 56 18 126/77 95 %       Intake/Output Summary (Last 24 hours) at 03/20/18 0842  Last data filed at 03/20/18 0640   Gross per 24 hour   Intake              240 ml   Output             1700 ml   Net            -1460 ml        PHYSICAL EXAM:  General: Alert, cooperative, no acute distress    EENT:  EOMI. Anicteric sclerae. Mucous membranes moist  Resp:  CTA bilaterally, no wheezing or rales. No accessory muscle use  CV:  Regular rhythm, no edema  GI:  Soft, non distended, non tender. +Bowel sounds  Neurologic:  Alert and oriented X 3, normal speech  Psych:   Good insight, not anxious nor agitated  Skin:  Dressing over wound, skin thickening on left arm and left leg, skin darker on left leg, very tender to palpation  ________________________________________________________________________  Care Plan discussed with:    Comments   Patient x    Family      RN x    Care Manager     Consultant                        Multidiciplinary team rounds were held today with , nursing, pharmacist and clinical coordinator. Patient's plan of care was discussed; medications were reviewed and discharge planning was addressed.      ________________________________________________________________________  Total NON critical care TIME:  25   Minutes    >50% of visit spent in counseling and coordination of care ________________________________________________________________________    Procedures: see electronic medical records for all procedures/Xrays and details which were not copied into this note but were reviewed prior to creation of Plan. LABS:  I reviewed today's most current labs and imaging studies.   Pertinent labs include:  Recent Labs      03/20/18 0412 03/18/18   1930  03/18/18   0335   WBC  8.8  11.2*  8.9   HGB  11.8*  12.7  12.0*   HCT  36.6  39.6  36.9   PLT  170  222  179     Recent Labs      03/20/18 0412 03/18/18   0335   NA  138  139   K  4.0  4.2   CL  105  104   CO2  28  31   GLU  115*  98   BUN  15  14   CREA  0.68*  0.60*   CA  8.3*  8.7       Signed: Wang Andersen MD

## 2018-03-20 NOTE — PROGRESS NOTES
Problem: Mobility Impaired (Adult and Pediatric)  Goal: *Acute Goals and Plan of Care (Insert Text)  Physical Therapy Goals  Initiated 3/19/2018  1. Patient will move from supine to sit and sit to supine  in bed with independence within 7 day(s). 2.  Patient will transfer from bed to chair and chair to bed with minimal assistance/contact guard assist using the least restrictive device within 7 day(s). 3.  Patient will perform sit to stand with minimal assistance/contact guard assist within 7 day(s). 4.  Patient will ambulate with minimal assistance/contact guard assist for 25 feet with the least restrictive device within 7 day(s). physical Therapy TREATMENT  Patient: Sayda Matthews (88 y.o. male)  Date: 3/20/2018  Diagnosis: Left foot infection  Chronic pain <principal problem not specified>       Precautions: WBAT  Chart, physical therapy assessment, plan of care and goals were reviewed. ASSESSMENT:  Patient continues to have c/o left foot pain limiting his ability/agreeability to stand. Patient tolerated sitting EOB but continues to demonstrate safety concerns with bed mobility. Impulsive with movements and decreased safety awareness as patient continued to scoot forward despite being at edge surface of bed. Static sitting balance intact but safety/balance concerns with patient bending to reach for objects while seated. Patient education on benefits of elevating foot while in bed to assist with edema and pain management and patient in agreement. Patient will benefit from continued skilled PT and recommend SNF for rehab services, wound care and pain management. Progression toward goals:  []    Improving appropriately and progressing toward goals  [x]    Improving slowly and progressing toward goals  []    Not making progress toward goals and plan of care will be adjusted     PLAN:  Patient continues to benefit from skilled intervention to address the above impairments.   Continue treatment per established plan of care. Discharge Recommendations:  [facility]  Further Equipment Recommendations for Discharge:  Defer to SNF     SUBJECTIVE:   Patient stated It's not that I don't want to, it's that I can't with the pain.     OBJECTIVE DATA SUMMARY:   Critical Behavior:  Neurologic State: Alert, Appropriate for age  Orientation Level: Oriented X4  Cognition: Appropriate safety awareness  Safety/Judgement: Decreased awareness of need for assistance, Decreased awareness of need for safety, Insight into deficits (can be impulsive with movements)  Functional Mobility Training:  Bed Mobility:  Supine to Sit: Minimum assistance (safety concerns, impulsive)     Transfers:   Patient declined reporting that he is unable to perform at this time secondary to pain      Balance:  Sitting: Impaired  Sitting - Static: Good (unsupported)  Sitting - Dynamic: Fair (occasional)  Standing:  (Not tested)  Ambulation/Gait Training:   Patient declined reporting that he is unable to perform at this time secondary to pain     Pain:   Patient reports pain in left foot is \"bad\"; did not quantify with a number and patient reports he has recently had pain medication. Reviewed repositioning for comfort.       Activity Tolerance:   No s/s of VS distress   After treatment:   []    Patient left in no apparent distress sitting up in chair  [x]    Patient left in no apparent distress in bed  [x]    Call bell left within reach  [x]    Nursing notified  []    Caregiver present  []    Bed alarm activated    COMMUNICATION/COLLABORATION:   The patients plan of care was discussed with: Registered Nurse    Jose Zavala, PT, DPT   Time Calculation: 14 mins

## 2018-03-21 LAB
ANION GAP SERPL CALC-SCNC: 4 MMOL/L (ref 5–15)
BUN SERPL-MCNC: 15 MG/DL (ref 6–20)
BUN/CREAT SERPL: 24 (ref 12–20)
CALCIUM SERPL-MCNC: 8.7 MG/DL (ref 8.5–10.1)
CHLORIDE SERPL-SCNC: 107 MMOL/L (ref 97–108)
CO2 SERPL-SCNC: 29 MMOL/L (ref 21–32)
CREAT SERPL-MCNC: 0.62 MG/DL (ref 0.7–1.3)
GLUCOSE SERPL-MCNC: 113 MG/DL (ref 65–100)
POTASSIUM SERPL-SCNC: 4.3 MMOL/L (ref 3.5–5.1)
SODIUM SERPL-SCNC: 140 MMOL/L (ref 136–145)

## 2018-03-21 PROCEDURE — 97530 THERAPEUTIC ACTIVITIES: CPT

## 2018-03-21 PROCEDURE — 36415 COLL VENOUS BLD VENIPUNCTURE: CPT | Performed by: EMERGENCY MEDICINE

## 2018-03-21 PROCEDURE — 74011250637 HC RX REV CODE- 250/637: Performed by: INTERNAL MEDICINE

## 2018-03-21 PROCEDURE — 65270000029 HC RM PRIVATE

## 2018-03-21 PROCEDURE — 80048 BASIC METABOLIC PNL TOTAL CA: CPT | Performed by: EMERGENCY MEDICINE

## 2018-03-21 PROCEDURE — 74011250637 HC RX REV CODE- 250/637: Performed by: EMERGENCY MEDICINE

## 2018-03-21 RX ADMIN — HYDROMORPHONE HYDROCHLORIDE 4 MG: 2 TABLET ORAL at 00:11

## 2018-03-21 RX ADMIN — HYDROMORPHONE HYDROCHLORIDE 4 MG: 2 TABLET ORAL at 18:04

## 2018-03-21 RX ADMIN — Medication 10 ML: at 00:14

## 2018-03-21 RX ADMIN — Medication 10 ML: at 21:58

## 2018-03-21 RX ADMIN — PREGABALIN 150 MG: 150 CAPSULE ORAL at 09:51

## 2018-03-21 RX ADMIN — METHADONE HYDROCHLORIDE 100 MG: 10 CONCENTRATE ORAL at 09:46

## 2018-03-21 RX ADMIN — LISINOPRIL 20 MG: 20 TABLET ORAL at 09:51

## 2018-03-21 RX ADMIN — LEVOTHYROXINE SODIUM 75 MCG: 25 TABLET ORAL at 07:52

## 2018-03-21 RX ADMIN — Medication 10 ML: at 09:52

## 2018-03-21 RX ADMIN — ALLOPURINOL 100 MG: 100 TABLET ORAL at 09:50

## 2018-03-21 RX ADMIN — HYDROMORPHONE HYDROCHLORIDE 4 MG: 2 TABLET ORAL at 04:20

## 2018-03-21 RX ADMIN — Medication 5 ML: at 18:06

## 2018-03-21 RX ADMIN — Medication 5 ML: at 18:05

## 2018-03-21 RX ADMIN — PREGABALIN 150 MG: 150 CAPSULE ORAL at 18:04

## 2018-03-21 RX ADMIN — HYDROMORPHONE HYDROCHLORIDE 4 MG: 2 TABLET ORAL at 12:28

## 2018-03-21 RX ADMIN — HYDROMORPHONE HYDROCHLORIDE 4 MG: 2 TABLET ORAL at 21:57

## 2018-03-21 NOTE — PROGRESS NOTES
Nutrition Assessment:    RECOMMENDATIONS:   Continue Regular diet    DIETITIANS INTERVENTIONS/PLAN:   Continue diet as tolerated  Monitor appetite/PO intake    ASSESSMENT:   Pt admitted with L foot infection. PMH: HTN, CVA. Chart reviewed for LOS. Pt's appetite has been good per RN flowsheet's, he is consuming the majority of his meal trays. He is s/p debridement of his foot. Labs reviewed, WNL. MST negative for previous nutritional risk factors. Current intake is likely adequate to meet est needs. SUBJECTIVE/OBJECTIVE:     Diet Order: Regular  % Eaten:  No data found. Pertinent Medications: none pertinent. Chemistries:  Lab Results   Component Value Date/Time    Sodium 140 03/21/2018 06:47 AM    Potassium 4.3 03/21/2018 06:47 AM    Chloride 107 03/21/2018 06:47 AM    CO2 29 03/21/2018 06:47 AM    Anion gap 4 (L) 03/21/2018 06:47 AM    Glucose 113 (H) 03/21/2018 06:47 AM    BUN 15 03/21/2018 06:47 AM    Creatinine 0.62 (L) 03/21/2018 06:47 AM    BUN/Creatinine ratio 24 (H) 03/21/2018 06:47 AM    GFR est AA >60 03/21/2018 06:47 AM    GFR est non-AA >60 03/21/2018 06:47 AM    Calcium 8.7 03/21/2018 06:47 AM    AST (SGOT) 14 (L) 01/09/2018 06:53 AM    Alk. phosphatase 135 (H) 01/09/2018 06:53 AM    Protein, total 6.0 03/16/2018 06:00 PM    Albumin 3.7 01/09/2018 06:53 AM    Globulin 3.0 01/09/2018 06:53 AM    A-G Ratio 1.2 01/09/2018 06:53 AM    ALT (SGPT) 32 01/09/2018 06:53 AM      Anthropometrics: Height: 6' 1\" (185.4 cm) Weight: 100.4 kg (221 lb 5.5 oz)  []bed scale    []stated   []unknown    IBW (%IBW):   ( ) UBW (%UBW):   (  %)    BMI: Body mass index is 29.2 kg/(m^2). This BMI is indicative of:  []Underweight   []Normal   [x]Overweight   [] Obesity   [] Extreme Obesity (BMI>40)  Estimated Nutrition Needs (Based on): 2280 Kcals/day (MSJ 1900 x 1.2) , 80 g (-100 (0.8-1gPro) ) Protein  Carbohydrate:  At Least 130 g/day  Fluids: 2200 mL/day    Last BM: 3/16   []Active     []Hyperactive []Hypoactive       [] Absent   BS  Skin:    [] Intact   [x] Incision  [] Breakdown   [] DTI   [] Tears/Excoriation/Abrasion  [x]Edema(+1-LLE) [] Other: Wt Readings from Last 30 Encounters:   03/13/18 100.4 kg (221 lb 5.5 oz)   03/14/18 100.2 kg (221 lb)   03/06/18 106.1 kg (234 lb)   12/21/17 94.3 kg (208 lb)   08/13/17 94.3 kg (208 lb)   01/27/17 99.2 kg (218 lb 9.6 oz)   01/06/17 99.3 kg (219 lb)   12/13/16 105.1 kg (231 lb 12.8 oz)   10/14/16 102 kg (224 lb 13.9 oz)   09/10/16 108.4 kg (239 lb)   06/02/16 108.5 kg (239 lb 4 oz)   11/09/15 101.6 kg (224 lb)   02/12/13 109.8 kg (242 lb)   02/07/13 108.9 kg (240 lb)   01/30/13 104.3 kg (230 lb)   12/12/12 104.5 kg (230 lb 6.4 oz)   08/30/12 105.2 kg (232 lb)   04/10/12 107 kg (236 lb)   01/25/12 107 kg (236 lb)   06/14/11 109.8 kg (242 lb)   06/13/11 109.8 kg (242 lb)   04/15/11 110.2 kg (243 lb)   04/01/11 110.6 kg (243 lb 12.8 oz)   03/11/11 109.8 kg (242 lb)      NUTRITION DIAGNOSES:   Problem:  No nutritional diagnosis at this time        NUTRITION INTERVENTIONS:  Meals/Snacks: General/healthful diet                  GOAL:   Pt will consume >70% of meals in 4-6 days.      Cultural, Spiritism, or Ethnic Dietary Needs: None     LEARNING NEEDS (Diet, Food/Nutrient-Drug Interaction):    [x] None Identified   [] Identified and Education Provided/Documented   [] Identified and Pt declined/was not appropriate      [x] Interdisciplinary Care Plan Reviewed/Documented    [x] Participated in Discharge Planning: Regular diet    [] Interdisciplinary Rounds     NUTRITION RISK:    [] High              [] Moderate           []  Low  [x]  Minimal/Uncompromised    PT SEEN FOR:    []  MD Consult: []Calorie Count      []Diabetic Diet Education        []Diet Education     []Electrolyte Management     []General Nutrition Management and Supplements     []Management of Tube Feeding     []TPN Recommendations    []  RN Referral:  []MST score >=2     []Enteral/Parenteral Nutrition PTA     []Pregnant: Gestational DM or Multigestation   []  Low BMI  []  Re-Screen   [x]  LOS   []  NPO/clears x 5 days   []  New TF/TPN    Dre Jimenez RD, [address]    Pager [phone]  Weekend Pager [phone]

## 2018-03-21 NOTE — PROGRESS NOTES
Problem: Mobility Impaired (Adult and Pediatric)  Goal: *Acute Goals and Plan of Care (Insert Text)  Physical Therapy Goals  Initiated 3/19/2018  1. Patient will move from supine to sit and sit to supine  in bed with independence within 7 day(s). 2.  Patient will transfer from bed to chair and chair to bed with minimal assistance/contact guard assist using the least restrictive device within 7 day(s). 3.  Patient will perform sit to stand with minimal assistance/contact guard assist within 7 day(s). 4.  Patient will ambulate with minimal assistance/contact guard assist for 25 feet with the least restrictive device within 7 day(s). physical Therapy TREATMENT  Patient: Katrinka Rubinstein (89 y.o. male)  Date: 3/21/2018  Diagnosis: Left foot infection  Chronic pain <principal problem not specified>       Precautions: WBAT (left hemiparesis from from CVA)  Chart, physical therapy assessment, plan of care and goals were reviewed. ASSESSMENT:  Patient continues to have c/o left foot pain limiting his tolerance for WB and mobility. Demonstrated improvements in sitting balance EOB and able to don post op shoe with supervision. Unsafe balance while scooting laterally. Patient was agreeable to attempt to get to the chair but unable to tolerate LLE WB to allow/attempt standing. Very unsafe and impulsive with lateral squat pivoting transfer to left. Not responsive to PT suggestions for improved safe techniques as patient reports unable to try due to pain. Remains a high fall risk and would benefit from SNF rehab. Progression toward goals:  []    Improving appropriately and progressing toward goals  [x]    Improving slowly and progressing toward goals  []    Not making progress toward goals and plan of care will be adjusted     PLAN:  Patient continues to benefit from skilled intervention to address the above impairments. Continue treatment per established plan of care.   Discharge Recommendations:  Skilled Nursing Facility  Further Equipment Recommendations for Discharge:  Defer to SNF     SUBJECTIVE:   Patient stated Marcos Wilkinson just need them to give me the right skin cream and the pain will be better.     OBJECTIVE DATA SUMMARY:   Critical Behavior:  Neurologic State: Alert  Orientation Level: Oriented X4  Cognition: Impulsive, Impaired decision making, Appropriate for age attention/concentration, Poor safety awareness  Safety/Judgement: Decreased awareness of need for assistance, Decreased awareness of need for safety  Functional Mobility Training:  Bed Mobility:  Rolling: Supervision  Supine to Sit: Minimum assistance; Additional time (assist due to patient going to left side of bed; supervision when transferring to the right)  Scooting: Supervision     Transfers:  Bed to Chair: Contact guard assistance (squat pivoting transfer to left; unsafe/impulsive)      Patient not able to tolerate attempting to stand due to pain with left foot WB, not responsive to PT instructions for improved safety with transfers   Balance:  Sitting: Impaired  Sitting - Static: Good (unsupported)  Sitting - Dynamic: Fair (occasional)  Standing: Impaired (patient unable to stand fully; squat pivoting)  Standing - Static: Poor  Standing - Dynamic : Poor  Ambulation/Gait Training:   Unable to attempt due to patients pain tolerance     Pain:  Pain Scale 1: Numeric (0 - 10)  Pain Intensity 1: 7  Pain Location 1: Foot  Pain Orientation 1: Left  Pain Description 1: Aching;Burning  Pain Intervention(s) 1: Emotional support  Activity Tolerance:   No s/s of VS distress   After treatment:   [x]    Patient left in no apparent distress sitting up in chair  []    Patient left in no apparent distress in bed  [x]    Call bell left within reach  [x]    Nursing notified  []    Caregiver present  [x]    Bed alarm activated    COMMUNICATION/COLLABORATION:   The patients plan of care was discussed with: Registered Nurse    Mau David, PT, DPT   Time Calculation: 12 mins

## 2018-03-21 NOTE — CONSULTS
RHEUMATOLOGY  ABOVE  Events noted. Awaiting Bx.  ? MRI thigh  CPK WNL, Direct MARLINE + , RNP slightly +, SCL-70 neg, ANCA neg, IFA MARLINE pending still. ? Undiff auto-immune disorder, ? MCTD.

## 2018-03-21 NOTE — PROGRESS NOTES
-MRI pending completion of MRI Safety Screening Form. -Patient cannot be scheduled until this form is completed.

## 2018-03-21 NOTE — PROGRESS NOTES
Patient refused skin biopsies. Said he'd rather have the MRI. I explained that all the MRI is going to do is potentially prompt an even deeper biopsy, and that it will no provide a diagnosis. He still refused biopsy.

## 2018-03-21 NOTE — PROGRESS NOTES
Hospitalist Progress Note    NAME: Sanjana Araya   :  1965   MRN:  [id number]   LOS:   8      Assessment / Plan:  Chronic left foot ulcer of unclear etiology, severe pain in setting of abnormal skin changes of left leg POA  -appreciate ortho evaluation s/p debridement  -MRI left foot done no evidence of acute osteo  -no clear infection, remains off antibiotics  -patient had extensive vascular evaluation, EUGENE negatives, multiple vascular surgeons have evaluated patient had stated that there was no indication for any type of procedure  -patient does not have diabetes, hba1c normal  -appreciate Dr. Blade Mortensen rheumatology evaluation, consider morphea, lipodermatosclerosis  -f/u labs ordered for work up   -appreciate Dr. Yocasta James input   -MRI order for AM  -counseled patient on smoking cessation to help wound healing  -patient can barely walk due to pain, will order PT/OT evaluation recommending SNF       No Benefits per last admit  -refused Biopsy today, spoke with Dr Antwon Jones, will address with patient, order MRI and see if pt willing to get biopsy, otherwise will D/C to home.     Hematuria  Known bladder mass  -patient began having hematuria yesterday, improved after stopping heparin and aspirin  -recommended [place] however patient refused despite discussing benefits and risks  -recommended urology consult however patient has been fired from [facility]  and he refuses to see anyone in the practice  -has plan for procedure with Dr. Cresencio Benson from [facility] at [place]  -defer work up to outpatient setting    Chronic pain on methadone  -called patient's methadone clinic [facility], confirmed patient's daily dose of methadone is 100 mg   -ordered patient's home dose daily discussed with pharmacy  -dilaudid PO PRN breakthrough pain    Hypertension  -lisinopril    HLD  -not on medication    Gout  -allopurinol    Hypothyroidism  -synthroid, TSH mildly elevated T4 normal    Tobacco use  -nicotine patch prn    Code status: Full  Prophylaxis: none due to hematuria   Recommended Disposition: pending hospital course and PT eval     Subjective:     Chief Complaint:  F/u foot ulcer  Pain in left foot severe, radiated up leg now, no change  Refused to sign consent for skin biopsy      Objective:     VITALS:   Last 24hrs VS reviewed since prior progress note. Most recent are:  Patient Vitals for the past 24 hrs:   Temp Pulse Resp BP SpO2   03/21/18 1131 97.7 °F (36.5 °C) 64 16 (!) 141/92 96 %   03/21/18 0807 97.7 °F (36.5 °C) (!) 51 16 139/76 95 %   03/21/18 0500 98 °F (36.7 °C) (!) 52 16 121/74 97 %   03/20/18 2318 98.1 °F (36.7 °C) (!) 54 16 119/59 98 %   03/20/18 1935 98 °F (36.7 °C) (!) 55 16 107/62 96 %   03/20/18 1551 97.5 °F (36.4 °C) (!) 53 - 119/78 100 %       Intake/Output Summary (Last 24 hours) at 03/21/18 1433  Last data filed at 03/21/18 1339   Gross per 24 hour   Intake                0 ml   Output             2300 ml   Net            -2300 ml        PHYSICAL EXAM:  General: Alert, cooperative, no acute distress    EENT:  EOMI. Anicteric sclerae. Mucous membranes moist  Resp:  CTA bilaterally, no wheezing or rales. No accessory muscle use  CV:  Regular rhythm, no edema  GI:  Soft, non distended, non tender. +Bowel sounds  Neurologic:  Alert and oriented X 3, normal speech  Psych:   Good insight, not anxious nor agitated  Skin:  Dressing over wound, skin thickening on left arm and left leg, skin darker on left leg, very tender to palpation  ________________________________________________________________________  Care Plan discussed with:    Comments   Patient x    Family      RN x    Care Manager     Consultant  x Dr Laurel Brooking team rounds were held today with , nursing, pharmacist and clinical coordinator. Patient's plan of care was discussed; medications were reviewed and discharge planning was addressed. ________________________________________________________________________  Total NON critical care TIME:  25   Minutes    >50% of visit spent in counseling and coordination of care       ________________________________________________________________________    Procedures: see electronic medical records for all procedures/Xrays and details which were not copied into this note but were reviewed prior to creation of Plan. LABS:  I reviewed today's most current labs and imaging studies.   Pertinent labs include:  Recent Labs      03/20/18   0412  03/18/18   1930   WBC  8.8  11.2*   HGB  11.8*  12.7   HCT  36.6  39.6   PLT  170  222     Recent Labs      03/21/18   0647  03/20/18   0412   NA  140  138   K  4.3  4.0   CL  107  105   CO2  29  28   GLU  113*  115*   BUN  15  15   CREA  0.62*  0.68*   CA  8.7  8.3*       Signed: Monika Henson MD

## 2018-03-22 ENCOUNTER — APPOINTMENT (OUTPATIENT)
Dept: MRI IMAGING | Age: 53
DRG: 380 | End: 2018-03-22
Attending: INTERNAL MEDICINE
Payer: MEDICAID

## 2018-03-22 ENCOUNTER — ANESTHESIA EVENT (OUTPATIENT)
Dept: MRI IMAGING | Age: 53
DRG: 380 | End: 2018-03-22
Payer: MEDICAID

## 2018-03-22 LAB
ANION GAP SERPL CALC-SCNC: 5 MMOL/L (ref 5–15)
BUN SERPL-MCNC: 16 MG/DL (ref 6–20)
BUN/CREAT SERPL: 24 (ref 12–20)
CALCIUM SERPL-MCNC: 8.6 MG/DL (ref 8.5–10.1)
CHLORIDE SERPL-SCNC: 106 MMOL/L (ref 97–108)
CO2 SERPL-SCNC: 30 MMOL/L (ref 21–32)
CREAT SERPL-MCNC: 0.66 MG/DL (ref 0.7–1.3)
GLUCOSE SERPL-MCNC: 101 MG/DL (ref 65–100)
POTASSIUM SERPL-SCNC: 4.2 MMOL/L (ref 3.5–5.1)
SODIUM SERPL-SCNC: 141 MMOL/L (ref 136–145)

## 2018-03-22 PROCEDURE — 36415 COLL VENOUS BLD VENIPUNCTURE: CPT | Performed by: EMERGENCY MEDICINE

## 2018-03-22 PROCEDURE — 74011250637 HC RX REV CODE- 250/637: Performed by: INTERNAL MEDICINE

## 2018-03-22 PROCEDURE — 74011250637 HC RX REV CODE- 250/637: Performed by: EMERGENCY MEDICINE

## 2018-03-22 PROCEDURE — 65270000029 HC RM PRIVATE

## 2018-03-22 PROCEDURE — 80048 BASIC METABOLIC PNL TOTAL CA: CPT | Performed by: EMERGENCY MEDICINE

## 2018-03-22 RX ADMIN — HYDROMORPHONE HYDROCHLORIDE 4 MG: 2 TABLET ORAL at 17:17

## 2018-03-22 RX ADMIN — METHADONE HYDROCHLORIDE 100 MG: 10 CONCENTRATE ORAL at 12:09

## 2018-03-22 RX ADMIN — Medication 10 ML: at 21:51

## 2018-03-22 RX ADMIN — PREGABALIN 150 MG: 150 CAPSULE ORAL at 09:32

## 2018-03-22 RX ADMIN — HYDROMORPHONE HYDROCHLORIDE 4 MG: 2 TABLET ORAL at 21:50

## 2018-03-22 RX ADMIN — LISINOPRIL 20 MG: 20 TABLET ORAL at 09:35

## 2018-03-22 RX ADMIN — Medication 10 ML: at 05:57

## 2018-03-22 RX ADMIN — HYDROMORPHONE HYDROCHLORIDE 4 MG: 2 TABLET ORAL at 09:32

## 2018-03-22 RX ADMIN — ALLOPURINOL 100 MG: 100 TABLET ORAL at 09:32

## 2018-03-22 RX ADMIN — HYDROMORPHONE HYDROCHLORIDE 4 MG: 2 TABLET ORAL at 01:57

## 2018-03-22 RX ADMIN — HYDROMORPHONE HYDROCHLORIDE 4 MG: 2 TABLET ORAL at 05:46

## 2018-03-22 RX ADMIN — Medication 10 ML: at 13:15

## 2018-03-22 RX ADMIN — LEVOTHYROXINE SODIUM 75 MCG: 25 TABLET ORAL at 05:46

## 2018-03-22 RX ADMIN — PREGABALIN 150 MG: 150 CAPSULE ORAL at 17:17

## 2018-03-22 NOTE — PROGRESS NOTES
Hospitalist Progress Note    NAME: Charity Walsh   :  1965   MRN:  [id number]   LOS:   9      Assessment / Plan:  Chronic left foot ulcer of unclear etiology, severe pain in setting of abnormal skin changes of left leg POA  -appreciate ortho evaluation s/p debridement  -MRI left foot done no evidence of acute osteo  -no clear infection, remains off antibiotics, stable  -patient had extensive vascular evaluation, EUGENE negatives, multiple vascular surgeons have evaluated patient had stated that there was no indication for any type of procedure  -patient does not have diabetes, hba1c normal  -appreciate Dr. Kristi Bowen rheumatology evaluation, consider morphea, lipodermatosclerosis.  MARLINE positive, RNP antibody positive  -f/u labs ordered for work up   -MRI left thigh ordered for AM  -counseled patient on smoking cessation to help wound healing  -PT/OT evaluation recommending SNF       No Benefits per last admit  -refused Biopsy yesterday, d/w pt today, we are trying to find out what is causing the skin lesions, he is now agreeable, if he refuses again or refuses the MRI, will discharge to home with outpatient follow up  -check MRI thigh per rheum recs  - D/C to home once MRI and biopsy done    Hematuria  Known bladder mass  -patient began having hematuria yesterday, improved after stopping heparin and aspirin  -recommended [place] however patient refused despite discussing benefits and risks  -recommended urology consult however patient has been fired from [facility]  and he refuses to see anyone in the practice  -has plan for procedure with Dr. Najma Holder from [facility] at [place]  -defer work up to outpatient setting    Chronic pain on methadone  -called patient's methadone clinic [facility], confirmed patient's daily dose of methadone is 100 mg   -ordered patient's home dose daily discussed with pharmacy  -dilaudid PO PRN breakthrough pain    Hypertension  -lisinopril    HLD  -not on medication    Gout  -allopurinol    Hypothyroidism  -synthroid, TSH mildly elevated T4 normal    Tobacco use  -nicotine patch prn    Code status: Full  Prophylaxis: none due to hematuria   Recommended Disposition: pending hospital course and PT eval     Subjective:     Chief Complaint:  F/u foot ulcer  No change in pain  D/W pt re biopsy, if refuses to get done, no reason to stay here, will discharge to home  Angry, but agrees to get biopsy      Objective:     VITALS:   Last 24hrs VS reviewed since prior progress note. Most recent are:  Patient Vitals for the past 24 hrs:   Temp Pulse Resp BP SpO2   03/22/18 0724 98 °F (36.7 °C) (!) 50 16 94/72 100 %   03/22/18 0549 97.6 °F (36.4 °C) 60 16 131/83 94 %   03/22/18 0015 97.6 °F (36.4 °C) (!) 58 16 118/79 96 %   03/21/18 2013 98.2 °F (36.8 °C) 61 18 104/66 97 %   03/21/18 1131 97.7 °F (36.5 °C) 64 16 (!) 141/92 96 %       Intake/Output Summary (Last 24 hours) at 03/22/18 2411  Last data filed at 03/22/18 0539   Gross per 24 hour   Intake                0 ml   Output             3275 ml   Net            -3275 ml        PHYSICAL EXAM:  General: Alert, cooperative, no acute distress    EENT:  EOMI. Anicteric sclerae. Mucous membranes moist  Resp:  CTA bilaterally, no wheezing or rales. No accessory muscle use  CV:  Regular rhythm, no edema  GI:  Soft, non distended, non tender. +Bowel sounds  Neurologic:  Alert and oriented X 3, normal speech  Psych:   Good insight, not anxious nor agitated  Skin:  Dressing over wound, skin thickening on left arm and left leg, skin darker on left leg, very tender to palpation  ________________________________________________________________________  Care Plan discussed with:    Comments   Patient x    Family      RN x    Care Manager     Consultant                        Multidiciplinary team rounds were held today with , nursing, pharmacist and clinical coordinator.   Patient's plan of care was discussed; medications were reviewed and discharge planning was addressed. ________________________________________________________________________  Total NON critical care TIME:  25   Minutes    >50% of visit spent in counseling and coordination of care       ________________________________________________________________________    Procedures: see electronic medical records for all procedures/Xrays and details which were not copied into this note but were reviewed prior to creation of Plan. LABS:  I reviewed today's most current labs and imaging studies.   Pertinent labs include:  Recent Labs      03/20/18   0412   WBC  8.8   HGB  11.8*   HCT  36.6   PLT  170     Recent Labs      03/22/18   0603  03/21/18   0647  03/20/18   0412   NA  141  140  138   K  4.2  4.3  4.0   CL  106  107  105   CO2  30  29  28   GLU  101*  113*  115*   BUN  16  15  15   CREA  0.66*  0.62*  0.68*   CA  8.6  8.7  8.3*       Signed: Maria D Coelho MD

## 2018-03-22 NOTE — PROGRESS NOTES
SHERRI spoke with [place] Coordinator: Alan Butt ([phone]), in regards to pt's d/c needs and plans. It was reported that pt has been to the hospital 5 times since Sept 2017 in regards to foot infection. It was reported by pt to Vanesa Obrien that pt is unable to do his own wound care due to weakness on left side, so his girlfriend has help assist with wound care dressing and changes. However, Vanesa Obrien reported that pt's girlfriend has lack with wound care dressing because of the lack of supplies: gloves, etc.    SHERRI informed Vanesa Obrien that hospitals can not provide wound care supplies to pt's when they leave enough to last them for weeks vs a few days. SHERRI informed Vanesa Obrien that home health was scheduled for pt on 3/16/18, due to him potentially leaving over the weekend. However, pt reported that he is \"unsure\" that he is interested in home health, because of his home situation, and his girlfriend will assist with wound care. SHERRI informed Vanesa Obrien that she will attempt to encourage pt to follow through with home health vs allowing his girlfriend to do it, because home health will provide pt with the needed wound care supply.   CM will contact Vanesa Obrien, upon pt's d/c.    JUVENCIO Malin CM  [phone]

## 2018-03-22 NOTE — PROGRESS NOTES
MRI NOTE    Mri screening sheet still needs to be completed and signed    Pt had MRI of his foot last week that required MAC/ Anesthesia.     If this is required today a order for MAC/Anestthesia needs to written by the ordering MD    Any questions    Call [phone]

## 2018-03-22 NOTE — PROGRESS NOTES
Again refusing skin biopsy. \"maybe tomorrow. \"    Would move toward d/c. Skin biopsies can be done as outpatient if he ever decided to allow.

## 2018-03-23 ENCOUNTER — ANESTHESIA (OUTPATIENT)
Dept: MRI IMAGING | Age: 53
DRG: 380 | End: 2018-03-23
Payer: MEDICAID

## 2018-03-23 ENCOUNTER — APPOINTMENT (OUTPATIENT)
Dept: MRI IMAGING | Age: 53
DRG: 380 | End: 2018-03-23
Attending: INTERNAL MEDICINE
Payer: MEDICAID

## 2018-03-23 LAB
ALBUMIN SERPL-MCNC: 3.5 G/DL (ref 3.5–5)
ALBUMIN/GLOB SERPL: 1 {RATIO} (ref 1.1–2.2)
ALP SERPL-CCNC: 112 U/L (ref 45–117)
ALT SERPL-CCNC: 31 U/L (ref 12–78)
ANION GAP SERPL CALC-SCNC: 4 MMOL/L (ref 5–15)
AST SERPL-CCNC: 14 U/L (ref 15–37)
BASOPHILS # BLD: 0.1 K/UL (ref 0–0.1)
BASOPHILS NFR BLD: 1 % (ref 0–1)
BILIRUB SERPL-MCNC: 0.3 MG/DL (ref 0.2–1)
BUN SERPL-MCNC: 15 MG/DL (ref 6–20)
BUN/CREAT SERPL: 21 (ref 12–20)
CALCIUM SERPL-MCNC: 8.8 MG/DL (ref 8.5–10.1)
CHLORIDE SERPL-SCNC: 105 MMOL/L (ref 97–108)
CO2 SERPL-SCNC: 31 MMOL/L (ref 21–32)
CREAT SERPL-MCNC: 0.72 MG/DL (ref 0.7–1.3)
DIFFERENTIAL METHOD BLD: ABNORMAL
EOSINOPHIL # BLD: 0.9 K/UL (ref 0–0.4)
EOSINOPHIL NFR BLD: 9 % (ref 0–7)
ERYTHROCYTE [DISTWIDTH] IN BLOOD BY AUTOMATED COUNT: 15.9 % (ref 11.5–14.5)
GLOBULIN SER CALC-MCNC: 3.5 G/DL (ref 2–4)
GLUCOSE SERPL-MCNC: 102 MG/DL (ref 65–100)
HCT VFR BLD AUTO: 38.1 % (ref 36.6–50.3)
HGB BLD-MCNC: 12.2 G/DL (ref 12.1–17)
IMM GRANULOCYTES # BLD: 0.1 K/UL (ref 0–0.04)
IMM GRANULOCYTES NFR BLD AUTO: 1 % (ref 0–0.5)
LYMPHOCYTES # BLD: 3.2 K/UL (ref 0.8–3.5)
LYMPHOCYTES NFR BLD: 33 % (ref 12–49)
MCH RBC QN AUTO: 28.4 PG (ref 26–34)
MCHC RBC AUTO-ENTMCNC: 32 G/DL (ref 30–36.5)
MCV RBC AUTO: 88.8 FL (ref 80–99)
MONOCYTES # BLD: 1 K/UL (ref 0–1)
MONOCYTES NFR BLD: 10 % (ref 5–13)
NEUTS SEG # BLD: 4.7 K/UL (ref 1.8–8)
NEUTS SEG NFR BLD: 48 % (ref 32–75)
NRBC # BLD: 0 K/UL (ref 0–0.01)
NRBC BLD-RTO: 0 PER 100 WBC
PLATELET # BLD AUTO: 206 K/UL (ref 150–400)
PMV BLD AUTO: 11.6 FL (ref 8.9–12.9)
POTASSIUM SERPL-SCNC: 4.1 MMOL/L (ref 3.5–5.1)
PROT SERPL-MCNC: 7 G/DL (ref 6.4–8.2)
RBC # BLD AUTO: 4.29 M/UL (ref 4.1–5.7)
SODIUM SERPL-SCNC: 140 MMOL/L (ref 136–145)
WBC # BLD AUTO: 9.9 K/UL (ref 4.1–11.1)

## 2018-03-23 PROCEDURE — 85025 COMPLETE CBC W/AUTO DIFF WBC: CPT | Performed by: INTERNAL MEDICINE

## 2018-03-23 PROCEDURE — 76060000034 HC ANESTHESIA 1.5 TO 2 HR

## 2018-03-23 PROCEDURE — 80053 COMPREHEN METABOLIC PANEL: CPT | Performed by: INTERNAL MEDICINE

## 2018-03-23 PROCEDURE — 65270000029 HC RM PRIVATE

## 2018-03-23 PROCEDURE — 73720 MRI LWR EXTREMITY W/O&W/DYE: CPT

## 2018-03-23 PROCEDURE — 74011250637 HC RX REV CODE- 250/637: Performed by: INTERNAL MEDICINE

## 2018-03-23 PROCEDURE — 36415 COLL VENOUS BLD VENIPUNCTURE: CPT | Performed by: INTERNAL MEDICINE

## 2018-03-23 PROCEDURE — 74011250636 HC RX REV CODE- 250/636: Performed by: INTERNAL MEDICINE

## 2018-03-23 PROCEDURE — 74011250637 HC RX REV CODE- 250/637: Performed by: EMERGENCY MEDICINE

## 2018-03-23 PROCEDURE — 74011250636 HC RX REV CODE- 250/636

## 2018-03-23 PROCEDURE — A9576 INJ PROHANCE MULTIPACK: HCPCS | Performed by: INTERNAL MEDICINE

## 2018-03-23 PROCEDURE — 74011250636 HC RX REV CODE- 250/636: Performed by: ANESTHESIOLOGY

## 2018-03-23 RX ORDER — FENTANYL CITRATE 50 UG/ML
100 INJECTION, SOLUTION INTRAMUSCULAR; INTRAVENOUS
Status: DISCONTINUED | OUTPATIENT
Start: 2018-03-23 | End: 2018-03-24

## 2018-03-23 RX ORDER — MIDAZOLAM HYDROCHLORIDE 1 MG/ML
INJECTION, SOLUTION INTRAMUSCULAR; INTRAVENOUS
Status: DISCONTINUED
Start: 2018-03-23 | End: 2018-03-26 | Stop reason: HOSPADM

## 2018-03-23 RX ADMIN — Medication 10 ML: at 06:08

## 2018-03-23 RX ADMIN — PREGABALIN 150 MG: 150 CAPSULE ORAL at 17:19

## 2018-03-23 RX ADMIN — HYDROMORPHONE HYDROCHLORIDE 4 MG: 2 TABLET ORAL at 02:21

## 2018-03-23 RX ADMIN — PREGABALIN 150 MG: 150 CAPSULE ORAL at 11:55

## 2018-03-23 RX ADMIN — METHADONE HYDROCHLORIDE 100 MG: 10 CONCENTRATE ORAL at 11:55

## 2018-03-23 RX ADMIN — HYDROMORPHONE HYDROCHLORIDE 4 MG: 2 TABLET ORAL at 19:22

## 2018-03-23 RX ADMIN — Medication 10 ML: at 13:37

## 2018-03-23 RX ADMIN — LISINOPRIL 20 MG: 20 TABLET ORAL at 11:55

## 2018-03-23 RX ADMIN — HYDROMORPHONE HYDROCHLORIDE 4 MG: 2 TABLET ORAL at 06:06

## 2018-03-23 RX ADMIN — ALLOPURINOL 100 MG: 100 TABLET ORAL at 11:55

## 2018-03-23 RX ADMIN — GADOTERIDOL 20 ML: 279.3 INJECTION, SOLUTION INTRAVENOUS at 10:00

## 2018-03-23 RX ADMIN — LEVOTHYROXINE SODIUM 75 MCG: 25 TABLET ORAL at 06:06

## 2018-03-23 RX ADMIN — HYDROMORPHONE HYDROCHLORIDE 4 MG: 2 TABLET ORAL at 11:56

## 2018-03-23 NOTE — PROGRESS NOTES
Spiritual Care Partner Volunteer visited patient in Gen Surg on 3/23/18. Documented by:  Shanthi Carey M.Div.    Paging Service [phone])

## 2018-03-23 NOTE — ROUTINE PROCESS
TRANSFER - IN REPORT:    Verbal report received from Jolanta Bowser CRNA on Ellyn Mcburney  being received from MRI post MAC  for routine progression of care      Report consisted of patients Situation, Background, Assessment and   Recommendations(SBAR). Information from the following report(s) Procedure Summary was reviewed with the receiving nurse. Opportunity for questions and clarification was provided. Assessment completed upon patients arrival to unit and care assumed.

## 2018-03-23 NOTE — ANESTHESIA PREPROCEDURE EVALUATION
Anesthetic History   No history of anesthetic complications            Review of Systems / Medical History  Patient summary reviewed, nursing notes reviewed and pertinent labs reviewed    Pulmonary  Within defined limits        Smoker         Neuro/Psych   Within defined limits  seizures: well controlled  CVA (L sided weakness)  TIA and psychiatric history     Cardiovascular  Within defined limits  Hypertension: well controlled          Hyperlipidemia    Exercise tolerance: <4 METS  Comments: Ejection fraction was  estimated in the range of 55 % to 60 %.    GI/Hepatic/Renal  Within defined limits              Endo/Other  Within defined limits    Hypothyroidism  Obesity     Other Findings   Comments: Hxm of DVT  Gout  + ETOH    Spasticity  Central pain         Physical Exam    Airway  Mallampati: II  TM Distance: 4 - 6 cm  Neck ROM: normal range of motion   Mouth opening: Normal     Cardiovascular  Regular rate and rhythm,  S1 and S2 normal,  no murmur, click, rub, or gallop             Dental  No notable dental hx       Pulmonary  Breath sounds clear to auscultation               Abdominal  GI exam deferred       Other Findings            Anesthetic Plan    ASA: 3  Anesthesia type: MAC          Induction: Intravenous  Anesthetic plan and risks discussed with: Patient

## 2018-03-23 NOTE — ROUTINE PROCESS
TRANSFER - OUT REPORT:    Verbal report given to EMERY Tolentino on Aden Rocha  being transferred to room 2141 for routine progression of care       Report consisted of patients Situation, Background, Assessment and   Recommendations(SBAR). Information from the following report(s) Procedure Summary was reviewed with the receiving nurse. Opportunity for questions and clarification was provided.       Patient transported with:   Transporter via stretcher     VS stable, no complaints voiced, pt accepting sips of water

## 2018-03-23 NOTE — CONSULTS
Rheumatology  Above events noted. Looks like he is going for Bx of sjin and Vastus Lateralis. MARLINE IFA is NEGATIVE.

## 2018-03-23 NOTE — PROGRESS NOTES
Occupational Therapy  Pt is off the floor for testing (MRI). Will defer OT treatment and continue to follow.

## 2018-03-23 NOTE — PROGRESS NOTES
General Surgery End of Shift Nursing Note    Bedside shift change report given to Raya Xie (oncoming nurse) by Vamshi Ashraf (offgoing nurse). Report included the following information SBAR, Kardex, Intake/Output, MAR and Recent Results. Shift worked:   7 am to 7 pm   Summary of shift:    Wound care completed. Voiding appropriately. Tolerating diet. Issues for physician to address:   Pt concerned about wound and biopsies. States \"I don't know how the leg will heal, when my foot isn't healing\" Pt states wound looks worse than it did. May need to be reevaluated. Number times ambulated in hallway past shift: 0    Number of times OOB to chair past shift: 0    Pain Management:  Current medication: dilaudid  Patient states pain is manageable on current pain medication: YES    GI:    Current diet:  DIET REGULAR  DIET ONE TIME MESSAGE  DIET NPO    Tolerating current diet: YES  Passing flatus: YES  Last Bowel Movement: yesterday       Respiratory:    Incentive Spirometer at bedside: YES  Patient instructed on use: YES    Patient Safety:    Falls Score: 2  Bed Alarm On? No  Sitter?  No    [facility]

## 2018-03-23 NOTE — ANESTHESIA POSTPROCEDURE EVALUATION
Post-Anesthesia Evaluation and Assessment    Patient: Tc Collins MRN: [id number]  SSN: [id number]    YOB: 1965  Age: 47 y.o. Sex: male       Cardiovascular Function/Vital Signs  Visit Vitals    /77 (BP 1 Location: Right arm, BP Patient Position: At rest)    Pulse (!) 54    Temp 36.6 °C (97.8 °F)    Resp 20    Ht 6' 1\" (1.854 m)    Wt 100.4 kg (221 lb 5.5 oz)    SpO2 98%    BMI 29.2 kg/m2       Patient is status post general anesthesia for * No procedures listed *. Nausea/Vomiting: None    Postoperative hydration reviewed and adequate. Pain:  Pain Scale 1: Numeric (0 - 10) (03/23/18 1053)  Pain Intensity 1: 0 (03/23/18 1053)   Managed    Neurological Status:   Neuro (WDL): Exceptions to WDL (03/15/18 1130)  Neuro  Neurologic State: Alert (03/22/18 2001)  Orientation Level: Oriented X4 (03/21/18 0940)  Cognition: Appropriate for age attention/concentration; Follows commands; Impaired decision making; Impulsive (03/21/18 0940)  Speech: Clear (03/21/18 0940)  LUE Motor Response: Weak (03/19/18 2012)  LLE Motor Response: Weak (03/19/18 2012)  RUE Motor Response: Purposeful (03/19/18 2012)  RLE Motor Response: Purposeful (03/19/18 2012)   At baseline    Mental Status and Level of Consciousness: Arousable    Pulmonary Status:   O2 Device: Room air (03/23/18 1053)   Adequate oxygenation and airway patent    Complications related to anesthesia: None    Post-anesthesia assessment completed.  No concerns    Signed By: Ramsey Hernandez MD     March 23, 2018

## 2018-03-24 LAB
ANION GAP SERPL CALC-SCNC: 4 MMOL/L (ref 5–15)
BASOPHILS # BLD: 0.1 K/UL (ref 0–0.1)
BASOPHILS NFR BLD: 1 % (ref 0–1)
BUN SERPL-MCNC: 19 MG/DL (ref 6–20)
BUN/CREAT SERPL: 28 (ref 12–20)
CALCIUM SERPL-MCNC: 8.5 MG/DL (ref 8.5–10.1)
CHLORIDE SERPL-SCNC: 106 MMOL/L (ref 97–108)
CO2 SERPL-SCNC: 29 MMOL/L (ref 21–32)
CREAT SERPL-MCNC: 0.68 MG/DL (ref 0.7–1.3)
DIFFERENTIAL METHOD BLD: ABNORMAL
EOSINOPHIL # BLD: 0.9 K/UL (ref 0–0.4)
EOSINOPHIL NFR BLD: 8 % (ref 0–7)
ERYTHROCYTE [DISTWIDTH] IN BLOOD BY AUTOMATED COUNT: 15.8 % (ref 11.5–14.5)
GLUCOSE SERPL-MCNC: 99 MG/DL (ref 65–100)
HCT VFR BLD AUTO: 37.9 % (ref 36.6–50.3)
HGB BLD-MCNC: 12.2 G/DL (ref 12.1–17)
IMM GRANULOCYTES # BLD: 0.1 K/UL (ref 0–0.04)
IMM GRANULOCYTES NFR BLD AUTO: 1 % (ref 0–0.5)
LYMPHOCYTES # BLD: 3.4 K/UL (ref 0.8–3.5)
LYMPHOCYTES NFR BLD: 32 % (ref 12–49)
MCH RBC QN AUTO: 28.7 PG (ref 26–34)
MCHC RBC AUTO-ENTMCNC: 32.2 G/DL (ref 30–36.5)
MCV RBC AUTO: 89.2 FL (ref 80–99)
MONOCYTES # BLD: 1.1 K/UL (ref 0–1)
MONOCYTES NFR BLD: 10 % (ref 5–13)
NEUTS SEG # BLD: 5.3 K/UL (ref 1.8–8)
NEUTS SEG NFR BLD: 49 % (ref 32–75)
NRBC # BLD: 0 K/UL (ref 0–0.01)
NRBC BLD-RTO: 0 PER 100 WBC
PLATELET # BLD AUTO: 207 K/UL (ref 150–400)
PMV BLD AUTO: 11.1 FL (ref 8.9–12.9)
POTASSIUM SERPL-SCNC: 4.2 MMOL/L (ref 3.5–5.1)
RBC # BLD AUTO: 4.25 M/UL (ref 4.1–5.7)
SODIUM SERPL-SCNC: 139 MMOL/L (ref 136–145)
WBC # BLD AUTO: 10.8 K/UL (ref 4.1–11.1)

## 2018-03-24 PROCEDURE — 74011250637 HC RX REV CODE- 250/637: Performed by: INTERNAL MEDICINE

## 2018-03-24 PROCEDURE — 85025 COMPLETE CBC W/AUTO DIFF WBC: CPT | Performed by: INTERNAL MEDICINE

## 2018-03-24 PROCEDURE — 80048 BASIC METABOLIC PNL TOTAL CA: CPT | Performed by: EMERGENCY MEDICINE

## 2018-03-24 PROCEDURE — 36415 COLL VENOUS BLD VENIPUNCTURE: CPT | Performed by: EMERGENCY MEDICINE

## 2018-03-24 PROCEDURE — 74011000250 HC RX REV CODE- 250: Performed by: INTERNAL MEDICINE

## 2018-03-24 PROCEDURE — 74011250637 HC RX REV CODE- 250/637: Performed by: EMERGENCY MEDICINE

## 2018-03-24 PROCEDURE — 65270000029 HC RM PRIVATE

## 2018-03-24 RX ORDER — LISINOPRIL 5 MG/1
10 TABLET ORAL DAILY
Status: DISCONTINUED | OUTPATIENT
Start: 2018-03-25 | End: 2018-03-26 | Stop reason: HOSPADM

## 2018-03-24 RX ADMIN — Medication 10 ML: at 04:20

## 2018-03-24 RX ADMIN — LISINOPRIL 20 MG: 20 TABLET ORAL at 08:32

## 2018-03-24 RX ADMIN — ALLOPURINOL 100 MG: 100 TABLET ORAL at 08:33

## 2018-03-24 RX ADMIN — Medication 10 ML: at 21:09

## 2018-03-24 RX ADMIN — HYDROMORPHONE HYDROCHLORIDE 2 MG: 2 TABLET ORAL at 16:43

## 2018-03-24 RX ADMIN — HYDROMORPHONE HYDROCHLORIDE 4 MG: 2 TABLET ORAL at 08:32

## 2018-03-24 RX ADMIN — LIDOCAINE HYDROCHLORIDE: 20 JELLY TOPICAL at 17:57

## 2018-03-24 RX ADMIN — Medication 10 ML: at 17:06

## 2018-03-24 RX ADMIN — Medication 10 ML: at 21:10

## 2018-03-24 RX ADMIN — HYDROMORPHONE HYDROCHLORIDE 2 MG: 2 TABLET ORAL at 21:07

## 2018-03-24 RX ADMIN — HYDROMORPHONE HYDROCHLORIDE 4 MG: 2 TABLET ORAL at 04:19

## 2018-03-24 RX ADMIN — LEVOTHYROXINE SODIUM 75 MCG: 25 TABLET ORAL at 06:54

## 2018-03-24 RX ADMIN — Medication 10 ML: at 00:26

## 2018-03-24 RX ADMIN — HYDROMORPHONE HYDROCHLORIDE 4 MG: 2 TABLET ORAL at 00:24

## 2018-03-24 RX ADMIN — PREGABALIN 150 MG: 150 CAPSULE ORAL at 08:32

## 2018-03-24 RX ADMIN — METHADONE HYDROCHLORIDE 100 MG: 10 CONCENTRATE ORAL at 08:33

## 2018-03-24 RX ADMIN — PREGABALIN 150 MG: 150 CAPSULE ORAL at 17:15

## 2018-03-24 NOTE — PROGRESS NOTES
Hospitalist Progress Note    NAME: Elma Anderson   :  1965   MRN:  [id number]   LOS:   10      Assessment / Plan:  Chronic left foot ulcer of unclear etiology, severe pain in setting of abnormal skin changes of left leg POA  -appreciate ortho evaluation s/p debridement  -MRI left foot done no evidence of acute osteo  -no clear infection, remains off antibiotics, stable  -patient had extensive vascular evaluation, EUGENE negatives, multiple vascular surgeons have evaluated patient had stated that there was no indication for any type of procedure  -patient does not have diabetes, hba1c normal  -appreciate Dr. Cherelle Werner rheumatology evaluation, consider morphea, lipodermatosclerosis. MARLINE positive, but negative by IFA, RNP antibody positive  -MRI left thigh 3/23/2018 Subtle muscle edema is in the biceps femoris, vastus medialis, and vastus lateralis muscles  -counseled patient on smoking cessation to help wound healing  -refused Biopsy twice this week, d/w pt today, we are trying to find out what is causing the skin lesions, he is now agreeable, needs deep muscle biopsies done.  Spoke with Dr Carlita Mathis, could not be done today, will set up next week as outpatient,then set up follow up with Dr Alexandr Roberto  - D/C to home in AM    Hematuria  Known bladder mass  -patient began having hematuria yesterday, improved after stopping heparin and aspirin  -recommended [place] however patient refused despite discussing benefits and risks  -recommended urology consult however patient has been fired from [facility]  and he refuses to see anyone in the practice  -has plan for procedure with Dr. Sheela Olmedo from [facility] at [facility]  -defer work up to outpatient setting    Chronic pain on methadone  -called patient's methadone clinic [facility], confirmed patient's daily dose of methadone is 100 mg   -ordered patient's home dose daily discussed with pharmacy  -dilaudid PO PRN breakthrough pain    Hypertension  -lisinopril    HLD  -not on medication    Gout  -allopurinol    Hypothyroidism  -synthroid, TSH mildly elevated T4 normal    Tobacco use  -nicotine patch prn    Code status: Full  Prophylaxis: none due to hematuria   Recommended Disposition: pending hospital course and PT eval     Subjective:     Chief Complaint:  F/u foot ulcer  No change in pain  Refused biopsy again yesterday,agreeable today  Dr Blanca Walsh cannot do until next week      Objective:     VITALS:   Last 24hrs VS reviewed since prior progress note. Most recent are:  Patient Vitals for the past 24 hrs:   Temp Pulse Resp BP SpO2   03/23/18 1457 97.8 °F (36.6 °C) (!) 53 20 (!) 86/59 94 %   03/23/18 1142 97.5 °F (36.4 °C) (!) 50 20 133/78 94 %   03/23/18 1053 - (!) 54 20 105/77 98 %   03/23/18 1040 - (!) 56 18 94/67 98 %   03/23/18 1039 - (!) 53 18 99/63 99 %   03/23/18 1034 - (!) 51 18 112/67 99 %   03/23/18 1028 - (!) 55 16 94/67 97 %   03/23/18 0756 97.8 °F (36.6 °C) (!) 47 20 129/69 95 %   03/23/18 0418 95.7 °F (35.4 °C) (!) 51 18 121/86 97 %       Intake/Output Summary (Last 24 hours) at 03/23/18 2149  Last data filed at 03/23/18 1340   Gross per 24 hour   Intake                0 ml   Output             1925 ml   Net            -1925 ml        PHYSICAL EXAM:  General: Alert, cooperative, no acute distress    EENT:  EOMI. Anicteric sclerae. Mucous membranes moist  Resp:  CTA bilaterally, no wheezing or rales. No accessory muscle use  CV:  Regular rhythm, no edema  GI:  Soft, non distended, non tender.  +Bowel sounds  Neurologic:  Alert and oriented X 3, normal speech  Psych:   Good insight, not anxious nor agitated  Skin:  Dressing over wound, skin thickening on left arm and left leg, skin darker on left leg, very tender to palpation  ________________________________________________________________________  Care Plan discussed with:    Comments   Patient x    Family      RN x    Care Manager     Consultant  x Dr Blanca Walsh Multidiciplinary team rounds were held today with , nursing, pharmacist and clinical coordinator. Patient's plan of care was discussed; medications were reviewed and discharge planning was addressed. ________________________________________________________________________  Total NON critical care TIME:  25   Minutes    >50% of visit spent in counseling and coordination of care       ________________________________________________________________________    Procedures: see electronic medical records for all procedures/Xrays and details which were not copied into this note but were reviewed prior to creation of Plan. LABS:  I reviewed today's most current labs and imaging studies.   Pertinent labs include:  Recent Labs      03/23/18 0417   WBC  9.9   HGB  12.2   HCT  38.1   PLT  206     Recent Labs      03/23/18   0417  03/22/18   0603  03/21/18   0647   NA  140  141  140   K  4.1  4.2  4.3   CL  105  106  107   CO2  31  30  29   GLU  102*  101*  113*   BUN  15  16  15   CREA  0.72  0.66*  0.62*   CA  8.8  8.6  8.7   ALB  3.5   --    --    TBILI  0.3   --    --    SGOT  14*   --    --    ALT  31   --    --        Signed: Alma Snowden MD

## 2018-03-24 NOTE — PROGRESS NOTES
Hospitalist Progress Note    NAME: Carlos Ritchie   :  1965   MRN:  [id number]   LOS:   6      Assessment / Plan:  Chronic left foot ulcer of unclear etiology, severe pain in setting of abnormal skin changes of left leg POA  -appreciate ortho evaluation s/p debridement  -MRI left foot done no evidence of acute osteo  -no clear infection, remains off antibiotics, stable  -patient had extensive vascular evaluation, EUGENE negatives, multiple vascular surgeons have evaluated patient had stated that there was no indication for any type of procedure  -patient does not have diabetes, hba1c normal  -appreciate Dr. Daron Heimlich rheumatology evaluation, consider morphea, lipodermatosclerosis. MARLINE positive, but negative by IFA, RNP antibody positive  -MRI left thigh 3/23/2018 Subtle muscle edema is in the biceps femoris, vastus medialis, and vastus lateralis muscles  -counseled patient on smoking cessation to help wound healing  -refused Biopsy twice this week, d/w pt today, we are trying to find out what is causing the skin lesions, he is now agreeable, needs deep muscle biopsies done. Spoke with Dr Louann Soto, could not be done friday, will set up next week as outpatient, then set up follow up with Dr Neisha Douglas.  - all work up can be done as outpatient  - at this point we are doing nothing for him except giving extra narcotics, he says he has an appointment at [facility] for his bladder mass  - will taper dilaudid to off  - he wants to get treated with terbinafine, he is convinced it is a fungal infection, I told him we want to wait till the biopsies are done.   - D/C to home once plans set up    Hematuria  Known bladder mass  -patient began having hematuria yesterday, improved after stopping heparin and aspirin  -recommended [place] however patient refused despite discussing benefits and risks  -recommended urology consult however patient has been fired from [facility]  and he refuses to see anyone in the practice  -has plan for procedure with Dr. Kar Ford from [facility]  -defer work up to outpatient setting    Chronic pain on methadone  -Patient's methadone clinic [facility], dose was confirmed at admission. patient's daily dose of methadone is 100 mg   -ordered patient's home dose daily discussed with pharmacy  -dilaudid PO PRN breakthrough pain, will taper to off    Hypertension  -lisinopril    HLD  -not on medication    Gout  -allopurinol    Hypothyroidism  -synthroid, TSH mildly elevated T4 normal    Tobacco use  -nicotine patch prn    Code status: Full  Prophylaxis: none due to hematuria   Recommended Disposition: pending hospital course and PT eval     Subjective:     Chief Complaint:  F/u foot ulcer  No change in pain  Says he has no where to go at discharge  Cm working with him      Objective:     VITALS:   Last 24hrs VS reviewed since prior progress note. Most recent are:  Patient Vitals for the past 24 hrs:   Temp Pulse Resp BP SpO2   03/24/18 0830 98 °F (36.7 °C) 60 17 99/58 96 %   03/24/18 0420 97.8 °F (36.6 °C) (!) 57 18 103/72 97 %   03/24/18 0023 98.5 °F (36.9 °C) (!) 57 20 110/69 97 %   03/23/18 2030 97.8 °F (36.6 °C) (!) 54 18 97/68 94 %   03/23/18 1457 97.8 °F (36.6 °C) (!) 53 20 (!) 86/59 94 %       Intake/Output Summary (Last 24 hours) at 03/24/18 1309  Last data filed at 03/24/18 1217   Gross per 24 hour   Intake              720 ml   Output             2475 ml   Net            -1755 ml        PHYSICAL EXAM:  General: Alert, cooperative, no acute distress    EENT:  EOMI. Anicteric sclerae. Mucous membranes moist  Resp:  CTA bilaterally, no wheezing or rales. No accessory muscle use  CV:  Regular rhythm, no edema  GI:  Soft, non distended, non tender.  +Bowel sounds  Neurologic:  Alert and oriented X 3, normal speech  Psych:   Good insight, not anxious nor agitated  Skin:    ________________________________________________________________________  Care Plan discussed with: Comments   Patient x    Family      RN x    Care Manager     Consultant                        Multidiciplinary team rounds were held today with , nursing, pharmacist and clinical coordinator. Patient's plan of care was discussed; medications were reviewed and discharge planning was addressed. ________________________________________________________________________  Total NON critical care TIME:  25   Minutes    >50% of visit spent in counseling and coordination of care       ________________________________________________________________________    Procedures: see electronic medical records for all procedures/Xrays and details which were not copied into this note but were reviewed prior to creation of Plan. LABS:  I reviewed today's most current labs and imaging studies.   Pertinent labs include:  Recent Labs      03/24/18   0423  03/23/18   0417   WBC  10.8  9.9   HGB  12.2  12.2   HCT  37.9  38.1   PLT  207  206     Recent Labs      03/24/18   0423  03/23/18   0417  03/22/18   0603   NA  139  140  141   K  4.2  4.1  4.2   CL  106  105  106   CO2  29  31  30   GLU  99  102*  101*   BUN  19  15  16   CREA  0.68*  0.72  0.66*   CA  8.5  8.8  8.6   ALB   --   3.5   --    TBILI   --   0.3   --    SGOT   --   14*   --    ALT   --   31   --        Signed: Risa Flores MD

## 2018-03-24 NOTE — PROGRESS NOTES
General Surgery End of Shift Nursing Note    Bedside shift change report given to Juany Che (oncoming nurse) by Racquel Plunkett (offgoing nurse). Report included the following information SBAR, Kardex, Intake/Output, MAR and Recent Results. Shift worked:   7 am to 7 pm   Summary of shift:    MRI today. Tolerating diet and voiding appropriately   Issues for physician to address:   Pt now states he can't leave until Sunday or Monday morning because he needs his methadone and he usually has to go to clinic to get it. Number times ambulated in hallway past shift: 0    Number of times OOB to chair past shift: 0    Pain Management:  Current medication: dilaudid  Patient states pain is manageable on current pain medication: YES    GI:    Current diet:  DIET ONE TIME MESSAGE  DIET REGULAR  DIET NPO    Tolerating current diet: YES  Passing flatus: YES  Last Bowel Movement: yesterday       Respiratory:    Incentive Spirometer at bedside: YES  Patient instructed on use: YES    Patient Safety:    Falls Score: 2  Bed Alarm On? No  Sitter?  No    [facility]

## 2018-03-24 NOTE — PROGRESS NOTES
General Surgery End of Shift Nursing Note    Bedside shift change report given to EMERY Tolentino (oncoming nurse) by Aida Coffey (offgoing nurse). Report included the following information SBAR, Kardex, Intake/Output and MAR. Shift worked:   11pm-7am   Summary of shift:    Pain level remained 9/10 despite intervention-Dilaudid 4mg tablets   Issues for physician to address:        Number times ambulated in hallway past shift: 0    Number of times OOB to chair past shift: 0    Pain Management:  Current medication: Dilaudid  Patient states pain is manageable on current pain medication: NO    GI:    Current diet:  DIET ONE TIME MESSAGE  DIET REGULAR  DIET NPO    Tolerating current diet: YES  Passing flatus: YES  Last Bowel Movement:    Appearance:     Respiratory:    Incentive Spirometer at bedside: YES  Patient instructed on use: YES    Patient Safety:    Falls Score: 2  Bed Alarm On? No  Sitter?  No    Rebekah Abdias Keller RN

## 2018-03-24 NOTE — DISCHARGE INSTRUCTIONS
Patient Discharge Instructions    Dee Prasad / [id number] : 1965    Admitted 3/13/2018 Discharged: 3/24/2018         DISCHARGE DIAGNOSIS:     Left leg pain and rash etiology unclear, ? Rheumatologic disease or limited scleroderma(morphea)    Bladder mass    Chronic methadone use, was in hospital at [address] [facility] in [place], [place] from 3/13/2018 to 3/26/2018, received his scheduled methadone dose while in the hospital, including Monday 3/26/2018         What to do at Home    1. Recommended diet: Regular Diet    2. Recommended activity: Activity as tolerated    3.  If you experience any of the following symptoms then please call your primary care physician or return to the emergency room if you cannot get hold of your doctor:   Fevers > 100.5, chills   Nausea or vomiting, persistent diarrhea > 24 hours   Blood in stool or black stools   Chest pain or SOB      Follow-up Information     Follow up With Details Comments Contact Info    Robert Jones MD Schedule an appointment as soon as possible for a visit in 2 weeks  [address] [place] [address]  [facility]      Bryant Irwin MD Schedule an appointment as soon as possible for a visit in 2 weeks  [address] [facility]  [address]      Lashell Rivas MD  call office on monday to get time set up for Biopsies of the skin and muscle [address] [facility] [address]  [phone]          All pain medications must come from your pain clinic    Very important to call Dr Robert's office this weeK to get a time set up for the biopsies of your left leg skin and muscle, we are trying to determine the etiology of your symptoms that might require a specific treatment    Follow up with Dr Abby Alonso in 1 week after the biopsy is done    Keep your scheduled appointment with your foot specialist or podiatrist    Keep you scheduled appointment for the evaluation of the bladder mass at [address] care left foot EVEN DAYS 16, 18, 20, etc..WOUND Care for the Left foot wound:  Remove the old dressing. Apply the Topical lidocaine jelly 2% on top of the wound and allow it to set for 2 minutes. Cleanse the foot by spraying with Carraklenz and wipe Firmly but slowly ONCE with gauze to remove the old drainage and wound debris. Wipe in between the toes with gauze. Apply the Sensicare cream to the skin around the wound like a picture frame to protect the skin, including between the toes. Apply the iodosorb gel (brown) to the xeroform gauze and apply to the wound bed. Cover with a bulky amount of 4x4's and wrap with radah. Tape to secure the dressing for TWO days. Change every other day. Information obtained by :  I understand that if any problems occur once I am at home I am to contact my physician. I understand and acknowledge receipt of the instructions indicated above.                                                                                                                                            Physician's or R.N.'s Signature                                                                  Date/Time                                                                                                                                              Patient or Representative Signature                                                          Date/Time

## 2018-03-24 NOTE — PROGRESS NOTES
Case Management    CM asked to assist with discharge home health for patient. Prior report indicated patient reluctant to have home health and felt his friend/CG Venkata could change bandage. CM attempted to discuss with Mr. Arun Horn importance of RN following wound care. At first, he was minimally receptive to home health. When trying to verify his home address, patient stated he could not return there, he was now homeless and we were \"putting him on the street\". CM attempted to offer other options, including Medicaid SNF, but patient refused this option. Mr. Arun Horn had CM speak with his friend, Brigitte Dumont, who expressed frustration and anger over discharge. She is demanding to speak to attending and this message was relayed to him by both CM and RN. She states that patient is homeless but that there is an toll free # he can call once he is in [facility] that will locate housing for him. She states Mr. Arun Horn is not leaving [address] until Monday, when he has an outpatient appointment with [facility] for bladder mass. Brigitte Dumont feels that patient's life is in jeopardy if he is discharged. She will not provide discharge transport or housing. She asked that CM call the toll free #, which I did. The number  is a [address] with services available Monday-Friday from 8AM to [address] [facility].      Both patient and friend are dissatisfied with care and are claiming premature discharge. Below is summary of options CM attempted to discuss:    1. Home health refused. Patient has no address (he states) and he was dissatisfied with prior agency [facility] because they did not take his insurance. He does not understand why friend cant assist with wound care. 2.  NH placement refused. Both patient and friend state only place that will accept him is [place] and they refuse to go there.   3.  Neither patient or friend could voice long term plan other than he will go to OP appointment on Monday and call crisis housing #.  4.  Mr. Lisa Ko states he will have transportation because his insurance covers it. He could not voice how transportation for appointment would be arranged. 5.  Cedric Bro, demanding to talk to attending. Mr. Lisa Ko indicated that he was in agreement with this. Neither could express any further plans for care or housing. Mr. Lisa Ko states he is unable to walk. This makes [address] a remote possibility. CM unsure whether he would accept any other plan than to stay here till Monday. CM concerned about certainity of an appointment, concerned that alternate housing can be arranged same day and concerned about ability to care for wound without [facility] services. CM spoke with Dr. Lita Torres and he will speak with friend when he is available. CM remains available for assistance. Luisana Colvin LCSW CSW-G  Ext [phone]         SHERRI suggested to both Mr. Lisa Ko and friend that they could discuss any concerns they had with this hospitalization with [facility] and advised how to reach them on Monday.     Luisana Colvin LCSW CSW-G  Ext [phone]

## 2018-03-25 PROCEDURE — 65270000029 HC RM PRIVATE

## 2018-03-25 PROCEDURE — 74011250637 HC RX REV CODE- 250/637: Performed by: EMERGENCY MEDICINE

## 2018-03-25 PROCEDURE — 74011250637 HC RX REV CODE- 250/637: Performed by: INTERNAL MEDICINE

## 2018-03-25 RX ORDER — METHADONE HYDROCHLORIDE 10 MG/ML
100 CONCENTRATE ORAL
Status: DISCONTINUED | OUTPATIENT
Start: 2018-03-26 | End: 2018-03-26 | Stop reason: HOSPADM

## 2018-03-25 RX ADMIN — METHADONE HYDROCHLORIDE 100 MG: 10 CONCENTRATE ORAL at 08:02

## 2018-03-25 RX ADMIN — Medication 10 ML: at 21:27

## 2018-03-25 RX ADMIN — HYDROMORPHONE HYDROCHLORIDE 2 MG: 2 TABLET ORAL at 01:22

## 2018-03-25 RX ADMIN — HYDROMORPHONE HYDROCHLORIDE 2 MG: 2 TABLET ORAL at 17:15

## 2018-03-25 RX ADMIN — LEVOTHYROXINE SODIUM 75 MCG: 25 TABLET ORAL at 05:50

## 2018-03-25 RX ADMIN — HYDROMORPHONE HYDROCHLORIDE 2 MG: 2 TABLET ORAL at 20:51

## 2018-03-25 RX ADMIN — LISINOPRIL 10 MG: 5 TABLET ORAL at 08:02

## 2018-03-25 RX ADMIN — PREGABALIN 150 MG: 150 CAPSULE ORAL at 17:15

## 2018-03-25 RX ADMIN — PREGABALIN 150 MG: 150 CAPSULE ORAL at 08:02

## 2018-03-25 RX ADMIN — HYDROMORPHONE HYDROCHLORIDE 2 MG: 2 TABLET ORAL at 08:02

## 2018-03-25 RX ADMIN — HYDROMORPHONE HYDROCHLORIDE 2 MG: 2 TABLET ORAL at 12:48

## 2018-03-25 RX ADMIN — Medication 10 ML: at 05:49

## 2018-03-25 RX ADMIN — Medication 10 ML: at 17:16

## 2018-03-25 RX ADMIN — ALLOPURINOL 100 MG: 100 TABLET ORAL at 08:02

## 2018-03-25 NOTE — PROGRESS NOTES
Attending informed CM that patient has stated he has an appointment tomorrow at [facility] in [place], [place] on 3/26/2018. Reportedly and from chart review, patient is making arrangements for transportation. Attending asked CM to confirm that arrangements have been made. Patient has Medicaid. Patient contacted [facility] [phone]. Spoke with Nick. Only confirmed transport for 3/26/2018 is a standing order for a substance abuse program patient attends.  is scheduled from his home to [address] [place] [address]    CM asked if there as a transport scheduled to [facility] in [place] and Nick informed CM \"No.\". Nick asked if ride was to be cancelled for 8am transport to substance abuse group. CM will confirm with attending and patient for discharge planning on Monday, 3/25/2018. Failed attempt to meet with patient. Per nursing, patient is in the shower at this time. Cm informed attending that only transport that can be confirmed at this time is for a standing order to patient's substance abuse program.  Patient is to be discharged Monday, 3/26/2018. Attending asked that we confirm discharge location.     [address] [place] [address]  Ext [phone]

## 2018-03-25 NOTE — PROGRESS NOTES
Problem: Falls - Risk of  Goal: *Absence of Falls  Document Jordon Fall Risk and appropriate interventions in the flowsheet.    Outcome: Progressing Towards Goal  Fall Risk Interventions:  Mobility Interventions: Patient to call before getting OOB         Medication Interventions: Evaluate medications/consider consulting pharmacy, Patient to call before getting OOB, Teach patient to arise slowly, Assess postural VS orthostatic hypotension    Elimination Interventions: Patient to call for help with toileting needs    History of Falls Interventions: Bed/chair exit alarm

## 2018-03-25 NOTE — PROGRESS NOTES
Hospitalist Progress Note    NAME: Aniceto Gannon   :  1965   MRN:  [id number]   LOS:   12      Assessment / Plan:  Chronic left foot ulcer of unclear etiology, severe pain in setting of abnormal skin changes of left leg POA  -appreciate ortho evaluation s/p debridement  -MRI left foot done no evidence of acute osteo  -no clear infection, remains off antibiotics, stable  -patient had extensive vascular evaluation, EUGENE negatives, multiple vascular surgeons have evaluated patient had stated that there was no indication for any type of procedure  -patient does not have diabetes, hba1c normal  -appreciate Dr. Best Gamma rheumatology evaluation, consider morphea, lipodermatosclerosis. MARLINE positive, but negative by IFA, RNP antibody positive  -MRI left thigh 3/23/2018 Subtle muscle edema is in the biceps femoris, vastus medialis, and vastus lateralis muscles  -counseled patient on smoking cessation to help wound healing  -refused Biopsy twice this week, d/w pt today, we are trying to find out what is causing the skin lesions, he is now agreeable, needs deep muscle biopsies done. Spoke with Dr Irvin Valles, could not be done friday, will set up next week as outpatient, then set up follow up with Dr Kelly Crawley.  - will taper dilaudid to off  - he wants to get treated with terbinafine, he is convinced it is a fungal infection, I told him we want to wait till the biopsies are done.   - D/C to home in Am to go to his urology appointment, can follow up with Dr Irvin Valles as outpatient    Hematuria  Known bladder mass  -patient began having hematuria yesterday, improved after stopping heparin and aspirin  -recommended [place] however patient refused despite discussing benefits and risks  -recommended urology consult however patient has been fired from [facility]  and he refuses to see anyone in the practice  -has plan for procedure with Dr. Iker Granger from [facility] [place]  -defer work up to outpatient setting, says he has an appointment to go to [facility] in AM    Chronic pain on methadone  -Patient's methadone clinic [facility], dose was confirmed at admission. patient's daily dose of methadone is 100 mg   -ordered patient's home dose daily discussed with pharmacy  -dilaudid PO PRN breakthrough pain, will taper to off  - No narcotic scripts at discharge    Hypertension  -lisinopril, reduced dose with borderline low BP    HLD  -not on medication    Gout  -allopurinol    Hypothyroidism  -synthroid, TSH mildly elevated T4 normal    Tobacco use  -nicotine patch prn    Code status: Full  Prophylaxis: none due to hematuria   Recommended Disposition: pending hospital course and PT eval     Subjective:     Chief Complaint:  F/u foot ulcer  No change in pain, now new complaints  Says he has set up transport to go to a urology clinic appointment tomorrow, wants to be discharged in AM  Cm working with him      Objective:     VITALS:   Last 24hrs VS reviewed since prior progress note. Most recent are:  Patient Vitals for the past 24 hrs:   Temp Pulse Resp BP SpO2   03/25/18 1454 97.7 °F (36.5 °C) 62 18 (!) 156/103 98 %   03/25/18 1118 98 °F (36.7 °C) (!) 53 18 137/72 97 %   03/25/18 0652 97.9 °F (36.6 °C) (!) 50 19 118/78 96 %   03/25/18 0107 98 °F (36.7 °C) (!) 55 19 121/90 95 %   03/24/18 2100 97.8 °F (36.6 °C) (!) 53 18 (!) 142/98 98 %   03/24/18 1702 98 °F (36.7 °C) (!) 51 20 115/81 97 %       Intake/Output Summary (Last 24 hours) at 03/25/18 1619  Last data filed at 03/25/18 5983   Gross per 24 hour   Intake              780 ml   Output             1440 ml   Net             -660 ml        PHYSICAL EXAM:  General: Alert, cooperative, no acute distress    EENT:  EOMI. Anicteric sclerae. Mucous membranes moist  Resp:  CTA bilaterally, no wheezing or rales. No accessory muscle use  CV:  Regular rhythm, no edema  GI:  Soft, non distended, non tender.  +Bowel sounds  Neurologic:  Alert and oriented X 3, normal speech  Psych:   Good insight, not anxious nor agitated  Skin:  Foot seen with wound care 3/24/18        Left foot 3/24/2018         ________________________________________________________________________  Care Plan discussed with:    Comments   Patient x    Family      RN x    Care Manager     Consultant                        Multidiciplinary team rounds were held today with , nursing, pharmacist and clinical coordinator. Patient's plan of care was discussed; medications were reviewed and discharge planning was addressed. ________________________________________________________________________  Total NON critical care TIME:  25   Minutes    >50% of visit spent in counseling and coordination of care       ________________________________________________________________________    Procedures: see electronic medical records for all procedures/Xrays and details which were not copied into this note but were reviewed prior to creation of Plan. LABS:  I reviewed today's most current labs and imaging studies.   Pertinent labs include:  Recent Labs      03/24/18 0423 03/23/18 0417   WBC  10.8  9.9   HGB  12.2  12.2   HCT  37.9  38.1   PLT  207  206     Recent Labs      03/24/18 0423  03/23/18 0417   NA  139  140   K  4.2  4.1   CL  106  105   CO2  29  31   GLU  99  102*   BUN  19  15   CREA  0.68*  0.72   CA  8.5  8.8   ALB   --   3.5   TBILI   --   0.3   SGOT   --   14*   ALT   --   31       Signed: Maria D Coelho MD

## 2018-03-25 NOTE — PROGRESS NOTES
General Surgery End of Shift Nursing Note    Bedside shift change report given to [facility] (oncoming nurse) by Channing Moody (offgoing nurse). Report included the following information SBAR, Kardex, Intake/Output, MAR and Recent Results. Shift worked:   7 am to 7 pm   Summary of shift:  The plan was for pt to be discharged today and schedule his biopsy outpatient, however pt states he couldn't go home today. MD and Case management aware.  came by to see the pt and offered options to pt regarding care beyond discharge (See prior progress notes). Wound care completed as ordered. Pt has some places around the wound that are blanchable and some that are not. Issues for physician to address:  Care beyond discharge: wound management and housing     Number times ambulated in hallway past shift: 0    Number of times OOB to chair past shift: 0    Pain Management:  Current medication: dilaudid  Patient states pain is manageable on current pain medication: YES    GI:    Current diet:  DIET ONE TIME MESSAGE  DIET REGULAR  DIET NPO    Tolerating current diet: YES  Passing flatus: YES  Last Bowel Movement: yesterday       Respiratory:    Incentive Spirometer at bedside: YES  Patient instructed on use: YES    Patient Safety:    Falls Score: 2  Bed Alarm On? No  Sitter?  No    Lindsey Perla

## 2018-03-25 NOTE — PROGRESS NOTES
General Surgery End of Shift Nursing Note    Bedside shift change report given to [address] (oncoming nurse) by Nanci Siddiqi (offgoing nurse). Report included the following information SBAR, Kardex, Procedure Summary, Intake/Output, MAR, Accordion and Recent Results. Shift worked:   7a-7p   Summary of shift:    Drsg CDI, pain meds as ordered. Pt was smoking in his room, RN told him not to do that again and pt agreed. Issues for physician to address:   none     Number times ambulated in hallway past shift: 0    Number of times OOB to chair past shift: 0    Pain Management:  Current medication: dilaudid, methadone  Patient states pain is manageable on current pain medication: YES    GI:    Current diet:  DIET ONE TIME MESSAGE  DIET REGULAR  DIET NPO    Tolerating current diet: YES  Passing flatus: YES  Last Bowel Movement: yesterday       Respiratory:    Incentive Spirometer at bedside: YES  Patient instructed on use: YES    Patient Safety:    Falls Score: 2  Bed Alarm On? No  Sitter?  No    Alirio Mcknight RN

## 2018-03-26 VITALS
BODY MASS INDEX: 29.34 KG/M2 | DIASTOLIC BLOOD PRESSURE: 84 MMHG | SYSTOLIC BLOOD PRESSURE: 130 MMHG | TEMPERATURE: 98.1 F | OXYGEN SATURATION: 95 % | WEIGHT: 221.34 LBS | HEART RATE: 61 BPM | HEIGHT: 73 IN | RESPIRATION RATE: 17 BRPM

## 2018-03-26 PROCEDURE — 74011250637 HC RX REV CODE- 250/637: Performed by: EMERGENCY MEDICINE

## 2018-03-26 PROCEDURE — 74011250637 HC RX REV CODE- 250/637: Performed by: INTERNAL MEDICINE

## 2018-03-26 RX ORDER — LISINOPRIL 10 MG/1
10 TABLET ORAL DAILY
Qty: 30 TAB | Refills: 6 | Status: ON HOLD | OUTPATIENT
Start: 2018-03-26 | End: 2018-06-06

## 2018-03-26 RX ADMIN — METHADONE HYDROCHLORIDE 100 MG: 10 CONCENTRATE ORAL at 10:25

## 2018-03-26 RX ADMIN — LEVOTHYROXINE SODIUM 75 MCG: 25 TABLET ORAL at 05:40

## 2018-03-26 RX ADMIN — HYDROMORPHONE HYDROCHLORIDE 2 MG: 2 TABLET ORAL at 05:51

## 2018-03-26 RX ADMIN — Medication 10 ML: at 07:03

## 2018-03-26 RX ADMIN — HYDROMORPHONE HYDROCHLORIDE 2 MG: 2 TABLET ORAL at 01:56

## 2018-03-26 RX ADMIN — ALLOPURINOL 100 MG: 100 TABLET ORAL at 08:52

## 2018-03-26 RX ADMIN — LISINOPRIL 10 MG: 5 TABLET ORAL at 08:52

## 2018-03-26 RX ADMIN — PREGABALIN 150 MG: 150 CAPSULE ORAL at 08:52

## 2018-03-26 NOTE — FACE TO FACE
Home Health Care Discharge Planning: [facility]  Face to Face Encounter      NAME: Charity Walsh   :  1965   MRN:  [id number]     Primary Diagnosis: chronic ulcer left foot, HTN, gout      Date of Face to Face:  3/26/2018 9:09 AM                                  Face to Face Encounter findings are related to primary reason for home care:   YES    1. I certify that the patient needs intermittent skilled nursing care, physical therapy and/or speech therapy. I will not be following this patient in the Community and Dr. Eusebio Lim MD will be responsible for signing the [address] of Care. 2. Initial Orders for Care: Skilled Nursing and Physical Therapy    3. I certify that this patient is homebound because of illness, need the aid of supportive devices such as walker and the assistance of another person in order to leave their place of residence. There exists a normal inability to leave home and leaving home requires a considerable and taxing effort. 4. I certify that this patient is under my care and that I had a Face-to-Face Encounter that meets the physician Face-to-Face Encounter requirements. Pt admitted to Hospital  From 3/13/2018 to 3/26/2018 with a chonic left foot ulcer I saw the patient on the day of discharge. Seen by Physical therapy and occupational therapy in house and recommended that home therapy be set up. Document the physical findings from the Face-to-Face Encounter that support the need for skilled services: Has wound that requires skilled nursing assessment and treatment  and Has new finding of weakness and altered mobility that requires skilled physical therapy services for evaluation and interventions.      Chuckie Murdock MD  Discharging Physician                   Office:  [phone]  Fax:    [phone]

## 2018-03-26 NOTE — DISCHARGE SUMMARY
Hospitalist Discharge Note    NAME: Jorge Alberto Quiroga   :  1965   MRN:  [id number]   LOS:   15    Admit date: 3/13/2018    Discharge date: 18    PCP: Dorothea Mccallum MD    Discharge Diagnoses:    Chronic left foot ulcer of unclear etiology POA, ? Limited scleroderma or other rheumatologic condition    Severe pain in setting of abnormal skin changes of left leg POA    Hematuria     Known bladder mass POA has outpatient urology follow up    Chronic pain syndrome on methadone POA    Essential hypertension POA    Hyperlipidemia POA    History of stroke POA left sided weakness    Gout POA    Hypothyroidism POA    Tobacco use     Code status: Full    Discharge Medications:  Current Discharge Medication List      CONTINUE these medications which have CHANGED    Details   lisinopril (PRINIVIL, ZESTRIL) 10 mg tablet Take 1 Tab by mouth daily. Qty: 30 Tab, Refills: 6         CONTINUE these medications which have NOT CHANGED    Details   aspirin delayed-release 81 mg tablet Take 81 mg by mouth daily. lidocaine-prilocaine (EMLA) topical cream Apply  to affected area three (3) times daily as needed for Pain. Patient applies a 'generous amount' to wound on top of left foot. pregabalin (LYRICA) 75 mg capsule Take 150 mg by mouth two (2) times a day. METHADONE HCL (METHADONE PO) Take 95 mg by mouth daily. Patient takes liquid methadone. colchicine (COLCRYS) 0.6 mg tablet Take 1 Tab by mouth daily as needed. For gout flare  Qty: 30 Tab, Refills: 0      allopurinol (ZYLOPRIM) 100 mg tablet Take 1 Tab by mouth daily. Qty: 30 Tab, Refills: 0      levothyroxine (SYNTHROID) 75 mcg tablet Take 1 Tab by mouth Daily (before breakfast).   Qty: 30 Tab, Refills: 0         STOP taking these medications       ibuprofen (ADVIL) 200 mg tablet Comments:   Reason for Stopping:         METHADONE HCL (METHADONE, BULK,) Comments:   Reason for Stopping:         aspirin 81 mg chewable tablet Comments:   Reason for Stopping: Follow-up Information     Follow up With Details Comments Contact Info    Itzel Garcia MD Schedule an appointment as soon as possible for a visit in 2 weeks  [facility]  Sailaja Steiner MD Schedule an appointment as soon as possible for a visit in 2 weeks Rheumatology specialist [address]  [address]      Bakari Sibley MD  call office this week to get time set up for Biopsies of the skin and muscle [facility] [address]  [address] [place] [address] [phone]          Time spent on discharge:   I spent greater than 30 minutes on discharge, seeing and examining the patient, reconciling home meds and new meds, coordinating care with case management, doing the discharge papers and the D/C summary    Discharge disposition: home with home health    Discharge Condition: Stable    Summary of admission H+P(copied from Dr Giulia Vines Note):     CHIEF COMPLAINT: Increasing left foot pain and drainage     HISTORY OF PRESENT ILLNESS:     Sanjana Araya is a 47 y.o. obese man with a history of chronic pain on methadone, hypertension, hyperlipidemia, and gout who was hospitalized in December through mid January for a left foot cellulitis and wound that required debridement and multiple consultations. He grew multiple organisms including Pseudomonas enterococcus group B strep and anaerobes at that time. Patient has been home with home health until about 2 weeks ago. He has been following with Dr. Elizabeth Ryder who he saw about 2 weeks ago and was given a 10 day course of antibiotics. he cannot say what kind he finished those.  In the past week, the pain in his left foot has been getting increasingly unbearable despite taking his daily methadone at the clinic, as well as daily Advil, and his Lyrica. He ran out of his Lyrica about 2 days ago and since home health stopped coming, a friend has been helping changes dressings. Patient's pain has been increasingly worse and he has had yellow drainage staining the dressing and presented to the ER for further evaluation and management. Patient still complains of pain said he did get his methadone this morning. He has had occasional chills but denies fever and reports that his appetite has been okay he did have some pain earlier in his knees. Denies any chest pain or shortness of breath or cough he is a chronic smoker but does not have nebulizers at home. In the ER patient had a white count of 11.8, sed rate of 6, lactic acid was normal and x-ray showed focal osteopenia concerning for possible osteomyelitis. He received Zosyn and vancomycin and we were asked to admit for further evaluation and management. Orthopedics agreed to see him in consultation since there is no podiatry available. Review of systems complete review of systems was done except as noted above in H&P was negative     We were asked to admit for work up and evaluation of the above problems.           Past Medical History:   Diagnosis Date    Aneurysm ([address]       (with stroke)    Gout      Hypercholesterolemia      Hypertension      Lesion of bladder      Seizures ([address]      Stroke [facility] 2006     left sided weakness    Thromboembolus ([facility]      Thyroid disease      TIA (transient ischemic attack) 2012      Left foot x-ray FINDINGS:    Three views of the left foot. There is diffuse osteopenia. There is  more focal osteopenia at the medial base of the first proximal phalanx. IMPRESSION:  Focal osteopenia at the medial base of the first proximal phalanx. This can be seen with osteomyelitis. LE doppler US FINDINGS:   The left common femoral, superficial femoral, and popliteal veins demonstrate  normal flow and response to augmentation.  Grayscale images are compromised by  patient body habitus. The visualized calf veins are patent by color flow. IMPRESSION:   Deep venous system patent by color flow. EUGENE testing INTERPRETATION/FINDINGS  PROCEDURE:  Ankle, brachial and digital arterial pressures, CW Doppler   waveforms and digital PPG waveforms were performed. 1. No evidence of significant peripheral arterial disease at rest in  the right leg. 2. No evidence of significant peripheral arterial disease at rest in  the left leg. 3. The right ankle/brachial index is 1.42 and the left ankle/brachial  index is 1.19.  4. The right great toe/brachial index is 1.38 and the left great  toe/brachial index is 1.02. MRI left foot FINDINGS:   There is lateral deviation of the toes. Examination of the marrow  demonstrates no evidence of stress reaction, fracture or marrow replacing  process. Specifically there are no reactive marrow changes at the base of the  proximal phalanx of the great toe. There is no significant subcutaneous edema. There is no drainable abscess collection. On the T1-weighted images there is  loss of signal in the dorsal soft tissues which may be due to fibrosis. There is  fatty atrophy of the intrinsic muscles of the foot. No fluid extending along the  tendon sheaths. IMPRESSION:  1. No evidence of drainable fluid collection or osteomyelitis    MRI thigh FINDINGS:   Bone marrow: within normal limits. No fracture, dislocation, or marrow  replacing process. No evidence of stress reaction or periostitis. Joint fluid:  Small knee joint effusion. No enhancing synovitis. Tendons: Intact. No tenosynovitis. Muscles: Moderate diffuse atrophy. Subtle muscle edema is greatest in the biceps  femoris, vastus lateralis, and basilar medialis muscles. No intramuscular or  superficial fascial pathology. Neurovascular bundles: Within normal limits. Articular cartilage: Knee osteoarthritis is partially imaged. No evidence of  septic arthritis.   Soft tissue mass: No mass or drainable fluid collection. Left inguinal lymph  nodes are prominent. Largest measures 1.9 x 1.9 cm. No evidence of subcutaneous  septal thickening. IMPRESSION:   1. Subtle muscle edema is in the biceps femoris, vastus medialis, and vastus  lateralis muscles. If tissue diagnosis is desired, highest yield would be the  distal vastus lateralis muscle. 2. No fascial pathology or synovitis on MRI. 3. Normal left femur bone. 4. Left inguinal lymphadenopathy should be followed resolution with physical  exam.    Hospital course:     Chronic left foot ulcer of unclear etiology,  Severe pain in setting of abnormal skin changes of left leg POA  - no SIRS criteria, admit BC negative  - wound culture with diptheroids  - Seen by orthopedics and Infectious diseases, felt no active infection  - MRI left foot done no evidence of osteomyelitis or abscess  - IV vancomycin and zosyn x 2 days, then no further antibiotics  - Wound care  - Extensive vascular evaluation, EUGENE negative, multiple vascular surgeons have evaluated patient in past, felt no vascular compromise  -patient does not have diabetes, hba1c normal  -Seen by Dr. Taryn Castillo from  rheumatology evaluation, consider morphea, lipodermatosclerosis. MARLINE positive, but negative by IFA, RNP antibody positive  _ Biopsy was recommended  -MRI left thigh 3/23/2018 Subtle muscle edema is in the biceps femoris, vastus medialis, and vastus lateralis muscles  -counseled patient on smoking cessation to help wound healing  -saw general surgeru for biopsy  - agreed to then refused Biopsy twice this week, d/w pt today, we are trying to find out what is causing the skin lesions, he is now agreeable, needs deep muscle biopsies done.  Spoke with Dr Antwon Jones, will set up next week as outpatient, then set up follow up with Dr Taryn Castillo.  - will taper dilaudid to off  - he wants to get treated with terbinafine, he is convinced it is a fungal infection, I told him we want to wait till the biopsies are done. - D/C to home, importance of outpatient follow up with Dr Kylie Butterfield and Dr Maylin Morales  as outpatient d/w him at length    Hematuria  Known bladder mass  - Had hematuria intermittently, improved after stopping heparin and aspirin  - Pt refused [place]  -recommended urology consult however patient has been fired from [facility]  and he refused to see anyone in the practice  -has plan for procedure with Dr. Aries Cyr from [address] [facility]  -defer work up to outpatient setting, says he has an appointment to go to [address] in next week(rescheduled while he was here)    Chronic pain on methadone  -Patient's methadone clinic [facility], dose was confirmed at admission. patient's daily dose of methadone is 100 mg   -ordered patient's home dose daily discussed with pharmacy  -dilaudid PO PRN breakthrough pain, will taper to off  - No narcotic scripts at discharge    Hypertension  -lisinopril, reduced dose with borderline low BP    HLD  -not on medication    Gout  -allopurinol    Hypothyroidism  -synthroid, TSH mildly elevated T4 normal    Tobacco use  -nicotine patch prn    Code status: Full  Prophylaxis: none due to hematuria   Recommended Disposition: pending hospital course and PT eval     Subjective:     Chief Complaint:  F/u foot ulcer  No change in pain, now new complaints  Urology appointment rescheduled for next week, still feels ready to go home  Importance of further work up and biopsy for the foot lesions  Cm setting up home health for wound care     Objective:     VITALS:   Last 24hrs VS reviewed since prior progress note.  Most recent are:  Patient Vitals for the past 24 hrs:   Temp Pulse Resp BP SpO2   03/26/18 0140 97.6 °F (36.4 °C) (!) 52 17 108/81 91 %   03/25/18 2037 97 °F (36.1 °C) (!) 59 18 130/84 96 %   03/25/18 1454 97.7 °F (36.5 °C) 62 18 (!) 156/103 98 %   03/25/18 1118 98 °F (36.7 °C) (!) 53 18 137/72 97 %       Intake/Output Summary (Last 24 hours) at 03/26/18 0804  Last data filed at 03/26/18 0139   Gross per 24 hour   Intake                0 ml   Output             1175 ml   Net            -1175 ml        PHYSICAL EXAM:  General: Alert, cooperative, no acute distress    EENT:  EOMI. Anicteric sclerae. Mucous membranes moist  Resp:  CTA bilaterally, no wheezing or rales. No accessory muscle use  CV:  Regular rhythm, no edema  GI:  Soft, non distended, non tender. +Bowel sounds  Neurologic:  Alert and oriented X 3, normal speech  Psych:   Good insight, not anxious nor agitated  Skin:  Foot seen with wound care 3/24/18, redness, no purulence        Left foot 3/24/2018         ________________________________________________________________________  Care Plan discussed with:    Comments   Patient x    Family      RN x    Care Manager x    Consultant                        Multidiciplinary team rounds were held today with , nursing, pharmacist and clinical coordinator. Patient's plan of care was discussed; medications were reviewed and discharge planning was addressed. ________________________________________________________________________    >50% of visit spent in counseling and coordination of care       ________________________________________________________________________    Procedures: see electronic medical records for all procedures/Xrays and details which were not copied into this note but were reviewed prior to creation of Plan. LABS:  I reviewed today's most current labs and imaging studies.   Pertinent labs include:  Recent Labs      03/24/18   0423   WBC  10.8   HGB  12.2   HCT  37.9   PLT  207     Recent Labs      03/24/18   0423   NA  139   K  4.2   CL  106   CO2  29   GLU  99   BUN  19   CREA  0.68*   CA  8.5       Signed: Alma Snowden MD

## 2018-03-26 NOTE — PROGRESS NOTES
CM completed room assessment with pt. Pt reported that he will like home health at d/c. CM will send [facility] updated notes to [facility] agency.     JUVENCIO Novak   [phone]

## 2018-03-26 NOTE — PROGRESS NOTES
0720 : Report received from [facility], [facility]. SBAR, Kardex, Intake/Output, MAR and Recent Results were discussed. James Penny, RN    21  :   DISCHARGE SUMMARY from Nurse    The following personal items collected during your admission are returned to you:   Dental Appliance: Dental Appliances: None  Vision: Visual Aid: None  Hearing Aid:    Jewelry: Jewelry: None  Clothing: Clothing: Shirt, Pants, With patient, Footwear  Other Valuables: Other Valuables: None  Valuables sent to safe:      PATIENT INSTRUCTIONS:    Take Home Medications:  Current Discharge Medication List      CONTINUE these medications which have CHANGED    Details   lisinopril (PRINIVIL, ZESTRIL) 10 mg tablet Take 1 Tab by mouth daily. Qty: 30 Tab, Refills: 6         CONTINUE these medications which have NOT CHANGED    Details   aspirin delayed-release 81 mg tablet Take 81 mg by mouth daily. lidocaine-prilocaine (EMLA) topical cream Apply  to affected area three (3) times daily as needed for Pain. Patient applies a 'generous amount' to wound on top of left foot. pregabalin (LYRICA) 75 mg capsule Take 150 mg by mouth two (2) times a day. METHADONE HCL (METHADONE PO) Take 95 mg by mouth daily. Patient takes liquid methadone. colchicine (COLCRYS) 0.6 mg tablet Take 1 Tab by mouth daily as needed. For gout flare  Qty: 30 Tab, Refills: 0      allopurinol (ZYLOPRIM) 100 mg tablet Take 1 Tab by mouth daily. Qty: 30 Tab, Refills: 0      levothyroxine (SYNTHROID) 75 mcg tablet Take 1 Tab by mouth Daily (before breakfast). Qty: 30 Tab, Refills: 0         STOP taking these medications       ibuprofen (ADVIL) 200 mg tablet Comments:   Reason for Stopping:         METHADONE HCL (METHADONE, BULK,) Comments:   Reason for Stopping:         aspirin 81 mg chewable tablet Comments:   Reason for Stopping:                Follow-up Appointments   Procedures    FOLLOW UP VISIT Appointment in: One Week     Standing Status:   Standing     Number of Occurrences:   1     Order Specific Question:   Appointment in     Answer: One Week       What to do at Home:  Recommended activity: Activity as tolerated    The discharge information has been reviewed with the patient. The patient verbalized understanding. At this time requested that RN call in script to [address] Completed by RN. Also requested dressing change, orders state every other day and was changed recently. Dressing was in pristine condition, changed on night shift. Patient stated \"something is wrong with it\" but was unable to articulate what was wrong with dressing. Then refused to have dressing changed. 1030 : RN called [address] trans at [phone]. Spoke with [place], expected transport time [phone].  Confirmation #[id number]
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
## 2018-03-28 NOTE — FACE TO FACE
Home Health Care Discharge Planning: [facility]  Face to Face Encounter      NAME: Elma Anderson   :  1965   MRN:  [id number]     Primary Diagnosis: left foot ulcer, etiology unclear, chronic pain syndrome, history of stroke    Date of Face to Face:  3/28/2018 1:40 PM                                  Face to Face Encounter findings are related to primary reason for home care:   YES    1. I certify that the patient needs intermittent skilled nursing care, physical therapy and/or speech therapy. I will not be following this patient in the Community and Dr. Kayy Farias MD will be responsible for signing the [address] of Care. 2. Initial Orders for Care: Skilled Nursing, PT    3. I certify that this patient is homebound because of illness, need the aid of supportive devices such as walker and the assistance of another person in order to leave their place of residence. There exists a normal inability to leave home and leaving home requires a considerable and taxing effort. 4. I certify that this patient is under my care and that I had a Face-to-Face Encounter that meets the physician Face-to-Face Encounter requirements. Pt admitted to Hospital  From 3/13/2018 to 3/28/2018, I saw the patient on the day of discharge. Seen by Physical therapy and occupational therapy in house and recommended that home therapy be set up. Document the physical findings from the Face-to-Face Encounter that support the need for skilled services:  Has wound that requires skilled nursing assessment and treatment     Marcelo Jay MD  Discharging Physician                   Office:  [phone]  Fax:    [phone]

## 2018-06-04 PROCEDURE — 90791 PSYCH DIAGNOSTIC EVALUATION: CPT

## 2018-06-04 PROCEDURE — 51798 US URINE CAPACITY MEASURE: CPT

## 2018-06-04 PROCEDURE — 99285 EMERGENCY DEPT VISIT HI MDM: CPT

## 2018-06-05 ENCOUNTER — HOSPITAL ENCOUNTER (EMERGENCY)
Age: 53
Discharge: PSYCHIATRIC OR SUBSTANCE USE FACILITY | End: 2018-06-06
Attending: EMERGENCY MEDICINE | Admitting: EMERGENCY MEDICINE
Payer: MEDICAID

## 2018-06-05 ENCOUNTER — HOSPITAL ENCOUNTER (EMERGENCY)
Age: 53
Discharge: HOME | End: 2018-06-05
Attending: EMERGENCY MEDICINE
Payer: MEDICAID

## 2018-06-05 VITALS
HEIGHT: 73 IN | HEART RATE: 61 BPM | DIASTOLIC BLOOD PRESSURE: 99 MMHG | OXYGEN SATURATION: 95 % | TEMPERATURE: 98.3 F | BODY MASS INDEX: 30.48 KG/M2 | WEIGHT: 230 LBS | SYSTOLIC BLOOD PRESSURE: 173 MMHG | RESPIRATION RATE: 16 BRPM

## 2018-06-05 DIAGNOSIS — R33.9 URINARY RETENTION: ICD-10-CM

## 2018-06-05 DIAGNOSIS — F32.A DEPRESSION, UNSPECIFIED DEPRESSION TYPE: ICD-10-CM

## 2018-06-05 DIAGNOSIS — R31.9 HEMATURIA, UNSPECIFIED TYPE: Primary | ICD-10-CM

## 2018-06-05 DIAGNOSIS — F32.A DEPRESSION, UNSPECIFIED DEPRESSION TYPE: Primary | ICD-10-CM

## 2018-06-05 LAB
ALBUMIN SERPL-MCNC: 3.7 G/DL (ref 3.5–5)
ALBUMIN/GLOB SERPL: 1.2 {RATIO} (ref 1.1–2.2)
ALP SERPL-CCNC: 102 U/L (ref 45–117)
ALT SERPL-CCNC: 15 U/L (ref 12–78)
AMPHET UR QL SCN: NEGATIVE
ANION GAP SERPL CALC-SCNC: 7 MMOL/L (ref 5–15)
APAP SERPL-MCNC: <2 UG/ML (ref 10–30)
APPEARANCE UR: CLEAR
AST SERPL-CCNC: 9 U/L (ref 15–37)
BARBITURATES UR QL SCN: NEGATIVE
BASOPHILS # BLD: 0.1 K/UL (ref 0–0.1)
BASOPHILS NFR BLD: 1 % (ref 0–1)
BENZODIAZ UR QL: NEGATIVE
BILIRUB SERPL-MCNC: 0.4 MG/DL (ref 0.2–1)
BILIRUB UR QL: NEGATIVE
BUN SERPL-MCNC: 10 MG/DL (ref 6–20)
BUN/CREAT SERPL: 14 (ref 12–20)
CALCIUM SERPL-MCNC: 8.8 MG/DL (ref 8.5–10.1)
CANNABINOIDS UR QL SCN: NEGATIVE
CHLORIDE SERPL-SCNC: 107 MMOL/L (ref 97–108)
CO2 SERPL-SCNC: 26 MMOL/L (ref 21–32)
COCAINE UR QL SCN: NEGATIVE
COLOR UR: NORMAL
CREAT SERPL-MCNC: 0.69 MG/DL (ref 0.7–1.3)
DIFFERENTIAL METHOD BLD: ABNORMAL
DRUG SCRN COMMENT,DRGCM: ABNORMAL
EOSINOPHIL # BLD: 0.5 K/UL (ref 0–0.4)
EOSINOPHIL NFR BLD: 4 % (ref 0–7)
ERYTHROCYTE [DISTWIDTH] IN BLOOD BY AUTOMATED COUNT: 14.7 % (ref 11.5–14.5)
ETHANOL SERPL-MCNC: <10 MG/DL
GLOBULIN SER CALC-MCNC: 3.1 G/DL (ref 2–4)
GLUCOSE SERPL-MCNC: 95 MG/DL (ref 65–100)
GLUCOSE UR STRIP.AUTO-MCNC: NEGATIVE MG/DL
HCT VFR BLD AUTO: 35.6 % (ref 36.6–50.3)
HGB BLD-MCNC: 11.8 G/DL (ref 12.1–17)
HGB UR QL STRIP: NEGATIVE
IMM GRANULOCYTES # BLD: 0.1 K/UL (ref 0–0.04)
IMM GRANULOCYTES NFR BLD AUTO: 1 % (ref 0–0.5)
KETONES UR QL STRIP.AUTO: NEGATIVE MG/DL
LEUKOCYTE ESTERASE UR QL STRIP.AUTO: NEGATIVE
LYMPHOCYTES # BLD: 3.1 K/UL (ref 0.8–3.5)
LYMPHOCYTES NFR BLD: 29 % (ref 12–49)
MCH RBC QN AUTO: 28.4 PG (ref 26–34)
MCHC RBC AUTO-ENTMCNC: 33.1 G/DL (ref 30–36.5)
MCV RBC AUTO: 85.8 FL (ref 80–99)
METHADONE UR QL: POSITIVE
MONOCYTES # BLD: 0.7 K/UL (ref 0–1)
MONOCYTES NFR BLD: 6 % (ref 5–13)
NEUTS SEG # BLD: 6.6 K/UL (ref 1.8–8)
NEUTS SEG NFR BLD: 60 % (ref 32–75)
NITRITE UR QL STRIP.AUTO: NEGATIVE
NRBC # BLD: 0 K/UL (ref 0–0.01)
NRBC BLD-RTO: 0 PER 100 WBC
OPIATES UR QL: NEGATIVE
PCP UR QL: NEGATIVE
PH UR STRIP: 6 [PH] (ref 5–8)
PLATELET # BLD AUTO: 262 K/UL (ref 150–400)
PMV BLD AUTO: 10.3 FL (ref 8.9–12.9)
POTASSIUM SERPL-SCNC: 3.9 MMOL/L (ref 3.5–5.1)
PROT SERPL-MCNC: 6.8 G/DL (ref 6.4–8.2)
PROT UR STRIP-MCNC: NEGATIVE MG/DL
RBC # BLD AUTO: 4.15 M/UL (ref 4.1–5.7)
SALICYLATES SERPL-MCNC: 4.5 MG/DL (ref 2.8–20)
SODIUM SERPL-SCNC: 140 MMOL/L (ref 136–145)
SP GR UR REFRACTOMETRY: 1.01 (ref 1–1.03)
UROBILINOGEN UR QL STRIP.AUTO: 1 EU/DL (ref 0.2–1)
WBC # BLD AUTO: 10.9 K/UL (ref 4.1–11.1)

## 2018-06-05 PROCEDURE — 90791 PSYCH DIAGNOSTIC EVALUATION: CPT

## 2018-06-05 PROCEDURE — 80307 DRUG TEST PRSMV CHEM ANLYZR: CPT | Performed by: EMERGENCY MEDICINE

## 2018-06-05 PROCEDURE — 51798 US URINE CAPACITY MEASURE: CPT

## 2018-06-05 PROCEDURE — 36415 COLL VENOUS BLD VENIPUNCTURE: CPT | Performed by: EMERGENCY MEDICINE

## 2018-06-05 PROCEDURE — 74011250637 HC RX REV CODE- 250/637: Performed by: EMERGENCY MEDICINE

## 2018-06-05 PROCEDURE — 81003 URINALYSIS AUTO W/O SCOPE: CPT | Performed by: EMERGENCY MEDICINE

## 2018-06-05 PROCEDURE — 80053 COMPREHEN METABOLIC PANEL: CPT | Performed by: EMERGENCY MEDICINE

## 2018-06-05 PROCEDURE — 99284 EMERGENCY DEPT VISIT MOD MDM: CPT

## 2018-06-05 PROCEDURE — 85025 COMPLETE CBC W/AUTO DIFF WBC: CPT | Performed by: EMERGENCY MEDICINE

## 2018-06-05 RX ORDER — OXYCODONE AND ACETAMINOPHEN 5; 325 MG/1; MG/1
1 TABLET ORAL ONCE
Status: COMPLETED | OUTPATIENT
Start: 2018-06-05 | End: 2018-06-05

## 2018-06-05 RX ORDER — LIDOCAINE HYDROCHLORIDE 20 MG/ML
JELLY TOPICAL ONCE
Status: DISCONTINUED | OUTPATIENT
Start: 2018-06-05 | End: 2018-06-05 | Stop reason: HOSPADM

## 2018-06-05 RX ORDER — METHADONE HYDROCHLORIDE 10 MG/ML
105 CONCENTRATE ORAL
Status: COMPLETED | OUTPATIENT
Start: 2018-06-05 | End: 2018-06-05

## 2018-06-05 RX ADMIN — METHADONE HYDROCHLORIDE 105 MG: 10 CONCENTRATE ORAL at 09:28

## 2018-06-05 RX ADMIN — OXYCODONE HYDROCHLORIDE AND ACETAMINOPHEN 1 TABLET: 5; 325 TABLET ORAL at 01:44

## 2018-06-05 NOTE — ED NOTES
MD Jenn Quinteros reviewed discharge instructions with the patient. The patient verbalized understanding. Patient discharged home with stable vitals. Patient ambulatory out of ED with steady gait. No further complaints noted at this time.

## 2018-06-05 NOTE — BSMART NOTE
Comprehensive Assessment Form Part 1      Section I - Disposition    Axis I - Depressive Disorder NOS                Nicotine Dependence   Axis II - Deferred  Axis III -   Past Medical History:   Diagnosis Date    Aneurysm ([facility] [address]       (with stroke)    Bladder tumor      Family history of bladder cancer      Gout      Hypercholesterolemia      Hypertension      Lesion of bladder      Seizures ([facility] [address]      Stroke ([facility] [address] 2006     left sided weakness    Thromboembolus ([facility] [address]      Thyroid disease      TIA (transient ischemic attack)         Axis IV - Health Problems  [place] V -       The Medical Doctor to Psychiatrist conference was not completed. The Medical Doctor is in agreement with Psychiatrist disposition because of (reason) patient meets criteria and is a voluntary admission. The plan is when patient is medically clear seek Behavioral Health Bed. The on-call Psychiatrist consulted was Dr. Tj Fernandez. The admitting Psychiatrist will be Dr. Tj Fernandez. The admitting Diagnosis is Depressive Disorder NOS. The Payor source is [employer] [employer]. The name of the representative was . This was approved for  days. The authorization number is . Section II - Integrated Summary  Summary:  Patient is 47year old  male reporting to ED patient had bladder tumor removed  and began having urinary bleeding again this morning with suprapubic pain. no retention per patient.  was asked to see patient after he would not answer ED provider question about suicidal thoughts but verbalized needing some help. Assessment was completed via tele psych, patient reported having suicidal thoughts but verbalized he did not want to hurt himself but he feels that he would. Patient stated he did not think it was safe for him to be alone right now. Patient denied homicidal thoughts and hallucinations.  Patient stated that his mother  from Cancer when he was 14 and there is a history of Cancer on both sides of family. Patient reported that he has been afraid of this his entire life and now he has to deal with it. Patient verbalized feeling that he cannot live like this. Patient reported that he is depressed, he has had difficulty sleeping. Patient has not been admitted psychiatrically. The patient reported that he has not told anyone about his cancer because he does not want to bother them mainly his children. Patient does not have support system. Patient reported he has pain management services with [facility] daily due to history of pain. Patient acknowledged that it has been a hard pill to swallow and he was emotional during assessment. The patient has demonstrated mental capacity to provide informed consent. The information is given by the patient. The Chief Complaint is urinary bleeding, suicidal.  The Precipitant Factors are health issues. Previous Hospitalizations: no  The patient has not previously been in restraints. Current Psychiatrist and/or  is none. Lethality Assessment:    The potential for suicide noted by the following: ideation . The potential for homicide is not noted. The patient has not been a perpetrator of sexual or physical abuse. There are not pending charges. The patient is not felt to be at risk for self harm or harm to others. The attending nurse was advised not noted. Section III - Psychosocial  The patient's overall mood and attitude is emotional. Low mood, cooperative. Feelings of helplessness and hopelessness are not observed. Generalized anxiety is not observed. Panic is not observed. Phobias are not observed. Obsessive compulsive tendencies are not observed. Section IV - Mental Status Exam  The patient's appearance shows no evidence of impairment. The patient's behavior shows no evidence of impairment. The patient is oriented to time, place, person and situation.   The patient's speech shows no evidence of impairment. The patient's mood is depressed. The range of affect shows no evidence of impairment. The patient's thought content demonstrates no evidence of impairment. The thought process shows no evidence of impairment. The patient's perception shows no evidence of impairment. The patient's memory shows no evidence of impairment. The patient's appetite shows no evidence of impairment. The patient's sleep shows no evidence of impairment. The patient's insight shows no evidence of impairment. The patient's judgement shows no evidence of impairment. Section V - Substance Abuse  The patient is using substances. The patient is using tobacco by inhalation for greater than 10 years with last use on yesterday. The patient has experienced the following withdrawal symptoms: N/A. Section VI - Living Arrangements  The patient is . The patient lives alone. The patient has 2 children ages adult. The patient does plan to return home upon discharge. The patient does not have legal issues pending. The patient's source of income comes from disability. Shinto and cultural practices have not been voiced at this time. The patient's greatest support comes from no one and this person will not be involved with the treatment. The patient has not been in an event described as horrible or outside the realm of ordinary life experience either currently or in the past.  The patient has not been a victim of sexual/physical abuse. Section VII - Other Areas of Clinical Concern  The highest grade achieved is unknown with the overall quality of school experience being described as unknown. The patient is currently unemployed and speaks [place] as a primary language. The patient has no communication impairments affecting communication. The patient's preference for learning can be described as: can read and write adequately.   The patient's hearing is normal.  The patient's vision is normal.      Maite Ro

## 2018-06-05 NOTE — ED NOTES
Assumed care of patient from Michela Velasquez RN. Patient sleeping on stretcher with call bell within reach. Patient on the monitor x2.

## 2018-06-05 NOTE — DISCHARGE INSTRUCTIONS
Learning About Depression Screening  What is depression screening? Depression screening is a way to see if you have depression symptoms. It may be done by a doctor or counselor. This screening is often part of a routine checkup. That's because your mental health is just as important as your physical health. Depression is a medical illness that affects how you feel, think, and act. You may:  · Have less energy. · Lose interest in your daily activities. · Feel sad and grouchy for a long time. Depression is very common. It affects men and women of all ages. Many things can trigger depression. Some people become depressed after they have a stroke or find out they have a major illness like cancer or heart disease. The death of a loved one, a breakup, or changes in the natural brain chemicals may lead to depression. It can run in families. Most experts believe that a combination of family history (a person's genes) and stressful life events can cause depression. What happens during screening? Your doctor may ask about your feelings, any changes in eating habits, your energy level, and your interest in your daily tasks. He or she may ask other questions, such as how well you are sleeping and if you can focus on the things you do. This may be an informal talk between the two of you. Or your doctor may ask you to fill out a quick form and then talk about your answers. Some diseases or changes in your body can cause symptoms that look like depression. So your doctor may do blood tests to help rule out other problems, such as hormone changes, a low thyroid level, or anemia. What happens after screening? If you have signs of depression, your doctor will talk to you about your options. Doctors usually treat depression with medicines or counseling. Often, combining the two works best. Many people don't get help because they think that they'll get over the depression on their own.  But people with depression may not get better unless they get treatment. Many people feel embarrassed or ashamed about having depression. But it isn't a sign of personal weakness. It's not a character flaw. A person who is depressed is not \"crazy. \" Depression is caused by changes in the brain. A serious symptom of depression is thinking about death or suicide. If you or someone you care about talks about this or about feeling hopeless, get help right away. It's important to know that depression can be treated. The first step toward feeling better is often just seeing that the problem exists. Where can you learn more? Go to http://joan-arvin.info/. Enter T185 in the search box to learn more about \"Learning About Depression Screening. \"  Current as of: May 12, 2017  Content Version: 11.4  © 3296-5846 Healthwise, Incorporated. Care instructions adapted under license by [employer] (which disclaims liability or warranty for this information). If you have questions about a medical condition or this instruction, always ask your healthcare professional. [address] any warranty or liability for your use of this information.

## 2018-06-05 NOTE — BSMART NOTE
[facility] recommends calling back at 11am and speak with NP Liza Ro so she can review with attending for appropriate bed placement.  [phone] option 2

## 2018-06-05 NOTE — ED PROVIDER NOTES
EMERGENCY DEPARTMENT HISTORY AND PHYSICAL EXAM      Date: 6/5/2018  Patient Name: Aparna Camp    History of Presenting Illness     Chief Complaint   Patient presents with    Blood in Urine     patient had bladder tumor removed 4/25 and began having urinary bleeding again this morning with suprapubic pain. no retention per patient       History Provided By: Patient    HPI: Aparna Camp, 47 y.o. male s/p removal of bladder tumor on 4/25/18 with PMHx significant for HTN / seizures / lesions of bladder / stroke / TIA / bladder tumor / hypercholesterolemia, presents via EMS to the ED with cc of acute onset of gross hematuria that began yesterday morning and has been intermittent since. Pt complains of associated lower abdominal pain, urinary frequency, and persistent penile pain that has been ongoing since his tumor removal 4/25 and waxes and wanes in intensity. He reports multiple complications following his bladder tumor removal via cystoscopy hospital admissions and intermittent bleeding from his penis, reports that he at one point removed his own catheter (weeks ago) in frustration which exacerbated his pain and bleeding. Pt reports that he last urinated ~1 hour ago. He specifically denies nausea, vomiting, fever, or chills. There are no other complaints, changes, or physical findings at this time. PCP: Arturo Hernandez MD    Current Facility-Administered Medications   Medication Dose Route Frequency Provider Last Rate Last Dose    lidocaine (URO-JET) 2 % jelly   Urethral ONCE Cecy Calzada MD        methadone (DOLOPHINE) 10 mg/mL concentrated solution 105 mg  105 mg Oral NOW Mal Vazquez MD         Current Outpatient Prescriptions   Medication Sig Dispense Refill    lisinopril (PRINIVIL, ZESTRIL) 10 mg tablet Take 1 Tab by mouth daily. 30 Tab 6    aspirin delayed-release 81 mg tablet Take 81 mg by mouth daily.       lidocaine-prilocaine (EMLA) topical cream Apply  to affected area three (3) times daily as needed for Pain. Patient applies a 'generous amount' to wound on top of left foot.  pregabalin (LYRICA) 75 mg capsule Take 150 mg by mouth two (2) times a day.  METHADONE HCL (METHADONE PO) Take 95 mg by mouth daily. Patient takes liquid methadone.  colchicine (COLCRYS) 0.6 mg tablet Take 1 Tab by mouth daily as needed. For gout flare 30 Tab 0    allopurinol (ZYLOPRIM) 100 mg tablet Take 1 Tab by mouth daily. 30 Tab 0    levothyroxine (SYNTHROID) 75 mcg tablet Take 1 Tab by mouth Daily (before breakfast). 30 Tab 0       Past History     Past Medical History:  Past Medical History:   Diagnosis Date    [facility]neur[facility] [address]     (with stroke)    Bladder tumor     Family history of bladder cancer     Gout     Hypercholesterolemia     Hypertension     Lesion of bladder     Seizures ([facility] [address]     Stroke ([facility] [address] 2006    left sided weakness    Thromboembolus ([facility] [address]     Thyroid disease     TIA (transient ischemic attack) 2012       Past Surgical History:  Past Surgical History:   Procedure Laterality Date    HX UROLOGICAL  04/20/2018    Cystoscopy, TURBT (greater than 5 cm resected) Dr. P[facility]rez Hoff, [facility].  KNEE ARTHROSCP HARV      left knee       Family History:  Family History   Problem Relation Age of Onset    Diabetes Father     Diabetes Sister     Diabetes Brother     Cancer Paternal Grandfather      Bladder       Social History:  Social History   Substance Use Topics    Smoking status: Current Every Day Smoker     Packs/day: 1.00    Smokeless tobacco: Never Used    Alcohol use 8.4 oz/week     14 Cans of beer per week       Allergies:   Allergies   Allergen Reactions    Sulfamethoxazole-Trimethoprim Rash     L eye and L hand    Ciprofloxacin Hives     Per pcp records    Codeine Hives     Tolerates dilaudid, oxycodone    Lyrica [Pregabalin] Hives    Sulfa (Sulfonamide Antibiotics) Rash    Ultram [Tramadol] Hives         Review of Systems   Review of Systems   Constitutional: Negative for activity change, appetite change, chills, fever and unexpected weight change. HENT: Negative for congestion. Eyes: Negative for pain and visual disturbance. Respiratory: Negative for cough and shortness of breath. Cardiovascular: Negative for chest pain. Gastrointestinal: Positive for abdominal pain. Negative for diarrhea, nausea and vomiting. Genitourinary: Positive for dysuria, frequency, hematuria and penile pain. Positive for penile bleeding   Musculoskeletal: Negative for back pain. Skin: Negative for rash. Neurological: Negative for headaches. Physical Exam   Physical Exam   Constitutional: He is oriented to person, place, and time. He appears well-developed and well-nourished. Middle-aged male in mild distress due to anxiety/pain   HENT:   Head: Normocephalic and atraumatic. Mouth/Throat: Oropharynx is clear and moist.   Eyes: Conjunctivae and EOM are normal. Pupils are equal, round, and reactive to light. Right eye exhibits no discharge. Left eye exhibits no discharge. Neck: Normal range of motion. Neck supple. Cardiovascular: Normal rate, regular rhythm and normal heart sounds. No murmur heard. Pulmonary/Chest: Effort normal and breath sounds normal. No respiratory distress. He has no wheezes. He has no rales. Abdominal: Soft. Bowel sounds are normal. There is no tenderness. Mild distension of the bladder   Musculoskeletal: Normal range of motion. He exhibits no edema. L arm and leg contracted  L foot in boot with partial amputation   Neurological: He is alert and oriented to person, place, and time. No cranial nerve deficit. He exhibits normal muscle tone. Skin: Skin is warm and dry. No rash noted. He is not diaphoretic. Nursing note and vitals reviewed.         Diagnostic Study Results     Labs -     Recent Results (from the past 12 hour(s))   URINALYSIS W/ RFLX MICROSCOPIC    Collection Time: 06/05/18  4:08 AM   Result Value Ref Range Color YELLOW/STRAW      Appearance CLEAR CLEAR      Specific gravity 1.010 1.003 - 1.030      pH (UA) 6.0 5.0 - 8.0      Protein NEGATIVE  NEG mg/dL    Glucose NEGATIVE  NEG mg/dL    Ketone NEGATIVE  NEG mg/dL    Bilirubin NEGATIVE  NEG      Blood NEGATIVE  NEG      Urobilinogen 1.0 0.2 - 1.0 EU/dL    Nitrites NEGATIVE  NEG      Leukocyte Esterase NEGATIVE  NEG     DRUG SCREEN, URINE    Collection Time: 06/05/18  4:08 AM   Result Value Ref Range    AMPHETAMINES NEGATIVE  NEG      BARBITURATES NEGATIVE  NEG      BENZODIAZEPINES NEGATIVE  NEG      COCAINE NEGATIVE  NEG      METHADONE POSITIVE (A) NEG      OPIATES NEGATIVE  NEG      PCP(PHENCYCLIDINE) NEGATIVE  NEG      THC (TH-CANNABINOL) NEGATIVE  NEG      Drug screen comment (NOTE)    ETHYL ALCOHOL    Collection Time: 06/05/18  6:05 AM   Result Value Ref Range    ALCOHOL(ETHYL),SERUM <59 <18 MG/DL   SALICYLATE    Collection Time: 06/05/18  6:05 AM   Result Value Ref Range    Salicylate level 4.5 2.8 - 20.0 MG/DL   ACETAMINOPHEN    Collection Time: 06/05/18  6:05 AM   Result Value Ref Range    Acetaminophen level <2 (L) 10 - 30 ug/mL   CBC WITH AUTOMATED DIFF    Collection Time: 06/05/18  6:05 AM   Result Value Ref Range    WBC 10.9 4.1 - 11.1 K/uL    RBC 4.15 4.10 - 5.70 M/uL    HGB 11.8 (L) 12.1 - 17.0 g/dL    HCT 35.6 (L) 36.6 - 50.3 %    MCV 85.8 80.0 - 99.0 FL    MCH 28.4 26.0 - 34.0 PG    MCHC 33.1 30.0 - 36.5 g/dL    RDW 14.7 (H) 11.5 - 14.5 %    PLATELET 176 443 - 066 K/uL    MPV 10.3 8.9 - 12.9 FL    NRBC 0.0 0  WBC    ABSOLUTE NRBC 0.00 0.00 - 0.01 K/uL    NEUTROPHILS 60 32 - 75 %    LYMPHOCYTES 29 12 - 49 %    MONOCYTES 6 5 - 13 %    EOSINOPHILS 4 0 - 7 %    BASOPHILS 1 0 - 1 %    IMMATURE GRANULOCYTES 1 (H) 0.0 - 0.5 %    ABS. NEUTROPHILS 6.6 1.8 - 8.0 K/UL    ABS. LYMPHOCYTES 3.1 0.8 - 3.5 K/UL    ABS. MONOCYTES 0.7 0.0 - 1.0 K/UL    ABS. EOSINOPHILS 0.5 (H) 0.0 - 0.4 K/UL    ABS. BASOPHILS 0.1 0.0 - 0.1 K/UL    ABS. IMM.  GRANS. 0.1 (H) 0.00 - 0.04 K/UL    DF AUTOMATED     METABOLIC PANEL, COMPREHENSIVE    Collection Time: 06/05/18  6:05 AM   Result Value Ref Range    Sodium 140 136 - 145 mmol/L    Potassium 3.9 3.5 - 5.1 mmol/L    Chloride 107 97 - 108 mmol/L    CO2 26 21 - 32 mmol/L    Anion gap 7 5 - 15 mmol/L    Glucose 95 65 - 100 mg/dL    BUN 10 6 - 20 MG/DL    Creatinine 0.69 (L) 0.70 - 1.30 MG/DL    BUN/Creatinine ratio 14 12 - 20      GFR est AA >60 >60 ml/min/1.73m2    GFR est non-AA >60 >60 ml/min/1.73m2    Calcium 8.8 8.5 - 10.1 MG/DL    Bilirubin, total 0.4 0.2 - 1.0 MG/DL    ALT (SGPT) 15 12 - 78 U/L    AST (SGOT) 9 (L) 15 - 37 U/L    Alk. phosphatase 102 45 - 117 U/L    Protein, total 6.8 6.4 - 8.2 g/dL    Albumin 3.7 3.5 - 5.0 g/dL    Globulin 3.1 2.0 - 4.0 g/dL    A-G Ratio 1.2 1.1 - 2.2         Medical Decision Making   I am the first provider for this patient. I reviewed the vital signs, available nursing notes, past medical history, past surgical history, family history and social history. Vital Signs-Reviewed the patient's vital signs.   Patient Vitals for the past 12 hrs:   BP SpO2   06/05/18 0915 (!) 186/94 97 %   06/05/18 0800 137/82 96 %   06/05/18 0700 134/73 96 %   06/05/18 0645 143/77 98 %   06/05/18 0630 140/86 96 %   06/05/18 0615 129/77 97 %   06/05/18 0600 118/88 96 %   06/05/18 0530 - 98 %   06/05/18 0515 140/81 97 %   06/05/18 0500 141/77 96 %   06/05/18 0445 (!) 150/92 98 %   06/05/18 0430 146/89 97 %   06/05/18 0415 131/71 97 %   06/05/18 0400 (!) 153/97 97 %   06/05/18 0345 153/90 96 %   06/05/18 0330 (!) 171/93 98 %   06/05/18 0315 151/85 97 %   06/05/18 0300 164/89 97 %   06/05/18 0245 152/80 98 %   06/05/18 0215 161/87 98 %   06/05/18 0200 159/80 97 %   06/05/18 0130 - 97 %   06/05/18 0115 (!) 164/93 97 %   06/05/18 0109 (!) 139/94 98 %       Pulse Oximetry Analysis - 98% on RA    Records Reviewed: Nursing Notes, Old Medical Records, Ambulance Run Sheet, Previous Radiology Studies and Previous Laboratory Studies    Provider Notes (Medical Decision Making):   Hematuria with possible retention. VS without evidence of sepsis. Requesting post-void residuals from RN for possible replacement of birmingham catheter. ED Course:   Initial assessment performed. The patients presenting problems have been discussed, and they are in agreement with the care plan formulated and outlined with them. I have encouraged them to ask questions as they arise throughout their visit. Medications   lidocaine (URO-JET) 2 % jelly (not administered)   methadone (DOLOPHINE) 10 mg/mL concentrated solution 105 mg (not administered)   oxyCODONE-acetaminophen (PERCOCET) 5-325 mg per tablet 1 Tab (1 Tab Oral Given 6/5/18 0144)     Progress Note:  4:20 AM  Per nursing, bladder scan shows ~495 cc retained urine. Written by Valencia Gorman, ED Scribe, as dictated by Odell Parker MD.    Progress Note:  5:05 AM  Discussed urine results and US showing urinary retention. Recommended birmingham to pt and he started crying, said that he needed help efels as though he cannot live with this pain and these problems any longer. Will consult with BSMART for possible psychiatric admission. Written by Valencia Gorman, ED Scribe, as dictated by Odell Parker MD.    Progress Note:  5:20 AM  Spoke with Aaron Gibbs [facility]), brief history provided, she will evaluate pt via tele-psych for possible admission. Written by Valencia Gorman, ED Scribe, as dictated by Odell Parker MD.    Progress Note:  5:51 AM  Spoke with Aaron Gibbs who has evaluated pt and feels that he meets criteria for admission and will look for bed placement pending results of urine drug screen. Patient continues to refuse birmingham replacement.   Written by Valencia Gorman, ED Scribe, as dictated by Odell Parker MD.    Progress Note:  6:29 AM  Spoke with Aaron Gibbs who has discussed pt with Dr. Va Jones, he has refused admission to [facility] given pt's underlying health issues. Shauna Vincent will pass case along to oncoming shift, who will continue to search for bed placement for pt. Written by Carole Devine ED Scribanitha, as dictated by Ansley Vasquez MD.    SIGN OUT:  7:14 AM  Patient's presentation, labs/imaging and plan of care was reviewed with Washington Francisco MD as part of sign out. They will make final disposition pending psych bed placement as part of the plan discussed with the patient. Washington Francisco MD's assistance in completion of this plan is greatly appreciated but it should be noted that I will be the provider of record for this patient. Ansley Vasquez MD    7:29 AM  Spoke with Pharmacy, who will verify whether or not pt is able to receive his dose of methadone given plan for transfer. 8:03 AM  Spoke with MARCELINA; [facility] will not accept, [facility] currently full. [facility] will not have bed until ~1100 at earliest; awaiting return [facility] facilities. 8:41 AM  Spoke with Matthew Saab, counselor at [facility]; confirmed pt's methadone dose is 105 mg, last dose 0812 yesterday (6/4/18). 12:07 PM  Pt voided ~250 mL at bedside. 12:15 PM  Spoke with MARCELINA Holder. She will inform Dr. Ruiz Kimball of pt's discharge. Critical Care Time:   0    Disposition:  DISCHARGE NOTE:  12:09 PM  The patient is ready for discharge. The patients signs, symptoms, diagnosis, and instructions for discharge have been discussed and the pt has conveyed their understanding. The patient is to follow up as recommended or return to the ER should their symptoms worsen. Plan has been discussed and patient has conveyed their agreement. PLAN:     1. Follow-up Information     Follow up With Details Comments Contact Info      Please see your urologist, as planned tomorrow. [facility]  If symptoms worsen including inability to urinate.  [address] [place] [address]  [phone]          Diagnosis     Clinical Impression:   1. Hematuria, unspecified type    2. Urinary retention    3. Depression, unspecified depression type        Attestations: This note is prepared by Ernie Tavares, acting as Scribe for Trish Waldron MD.    The scribe's documentation has been prepared under my direction and personally reviewed by me in its entirety. I confirm that the note above accurately reflects all work, treatment, procedures, and medical decision making performed by me.   Trish Waldron MD

## 2018-06-05 NOTE — ED NOTES
Second post void bladder scan showed 345 mL of urine. MD made aware. Kayley Orantesr at [facility] trying to admit patient. At this time, pt still refusing Gross catheter.

## 2018-06-05 NOTE — PROGRESS NOTES
Dr. María Tovar confirmed with [address] [place] [address] that patients dose was 105 mg and last dose was 8:10 yesterday. This was confirmed with counseler Harmony Chacko and that he will be continuing with them upon discharge.     Elgin Edmond, CARLOSD

## 2018-06-05 NOTE — ED NOTES
Patient provided with meal tray. Family at bedside. Patient resting comfortably in stretcher. Patient's needs assessed at this time. Patient has no further complaints.

## 2018-06-05 NOTE — ED NOTES
Pt states he will pee when he can. Pt informed that when he finishes, this nurse will come in and do a bladder scan to make sure he is not retaining urine.

## 2018-06-05 NOTE — BSMART NOTE
8:41am: Reviewed case with on-call psychiatrist Dr. Demario Najera. He agrees with Dr. Rosa Lopez that the patient is too medically compromised for [facility].    8:50am: Called [facility] who reports receiving the information but has not reviewed it yet. 8:55am: Called [facility] and Rogers Fish reports that she should know about the patient's status after 12 today. 9am: Asked that a psychiatric consult be placed as patient has remained in ER more than 8 hours with no appropriate bed found at this time. 10:30am: [facility] is still reviewing. Jo Barnett is at capacity. Asked that labs be refaxed larger. 12:00pm: Called [facility] and verbally reviewed case with NP. She said based on chief complaint being related to tumor removal and patient currently retaining urine as noted in nursing notes that she feels the patient needs medical admission and care and that [facility] would not be able to accommodate this on a med-psych unit. She declined the patient and stated after a medical admission for presenting complaints if patient still needed admission they could reconsider then. 12:15pm: Received call from [facility] Dr. Ness Gallardo reports that she and patient's caregiver have created a safety plan and she feels patient can be discharged safely. Called and informed Dr. Ruiz Kimball that consult will be cancelled.     Mike Morton [facility]

## 2018-06-05 NOTE — ED NOTES
Patient refusing [place] catheter. Patient using urinal. 1200ml of urine total emptied from multiple urinals at patient's bedside  Dietary called at this time to provide patient with a meal tray.

## 2018-06-06 ENCOUNTER — HOSPITAL ENCOUNTER (INPATIENT)
Age: 53
LOS: 2 days | Discharge: HOME | DRG: 754 | End: 2018-06-08
Attending: PSYCHIATRY & NEUROLOGY
Payer: MEDICAID

## 2018-06-06 VITALS
RESPIRATION RATE: 14 BRPM | WEIGHT: 225.53 LBS | HEART RATE: 52 BPM | SYSTOLIC BLOOD PRESSURE: 121 MMHG | HEIGHT: 73 IN | DIASTOLIC BLOOD PRESSURE: 67 MMHG | BODY MASS INDEX: 29.89 KG/M2 | TEMPERATURE: 97.8 F | OXYGEN SATURATION: 97 %

## 2018-06-06 PROBLEM — F32.A DEPRESSION: Status: ACTIVE | Noted: 2018-06-06

## 2018-06-06 PROBLEM — F32.9 MAJOR DEPRESSION: Status: ACTIVE | Noted: 2018-06-06

## 2018-06-06 PROBLEM — F11.20 UNCOMPLICATED OPIOID DEPENDENCE (HCC): Status: ACTIVE | Noted: 2018-06-06

## 2018-06-06 LAB
AMPHET UR QL SCN: NEGATIVE
APPEARANCE UR: CLEAR
BACTERIA URNS QL MICRO: NEGATIVE /HPF
BARBITURATES UR QL SCN: NEGATIVE
BENZODIAZ UR QL: NEGATIVE
BILIRUB UR QL: NEGATIVE
CANNABINOIDS UR QL SCN: NEGATIVE
COCAINE UR QL SCN: NEGATIVE
COLOR UR: NORMAL
DRUG SCRN COMMENT,DRGCM: ABNORMAL
EPITH CASTS URNS QL MICRO: NORMAL /LPF
GLUCOSE UR STRIP.AUTO-MCNC: NEGATIVE MG/DL
HGB UR QL STRIP: NEGATIVE
HYALINE CASTS URNS QL MICRO: NORMAL /LPF (ref 0–5)
KETONES UR QL STRIP.AUTO: NEGATIVE MG/DL
LEUKOCYTE ESTERASE UR QL STRIP.AUTO: NEGATIVE
METHADONE UR QL: POSITIVE
NITRITE UR QL STRIP.AUTO: NEGATIVE
OPIATES UR QL: NEGATIVE
PCP UR QL: NEGATIVE
PH UR STRIP: 6 [PH] (ref 5–8)
PROT UR STRIP-MCNC: NEGATIVE MG/DL
RBC #/AREA URNS HPF: NORMAL /HPF (ref 0–5)
SP GR UR REFRACTOMETRY: 1.01 (ref 1–1.03)
UA: UC IF INDICATED,UAUC: NORMAL
UROBILINOGEN UR QL STRIP.AUTO: 1 EU/DL (ref 0.2–1)
WBC URNS QL MICRO: NORMAL /HPF (ref 0–4)

## 2018-06-06 PROCEDURE — 65220000003 HC RM SEMIPRIVATE PSYCH

## 2018-06-06 PROCEDURE — 81001 URINALYSIS AUTO W/SCOPE: CPT

## 2018-06-06 PROCEDURE — 80307 DRUG TEST PRSMV CHEM ANLYZR: CPT

## 2018-06-06 PROCEDURE — 74011250637 HC RX REV CODE- 250/637: Performed by: PSYCHIATRY & NEUROLOGY

## 2018-06-06 PROCEDURE — 74011250637 HC RX REV CODE- 250/637: Performed by: PHYSICIAN ASSISTANT

## 2018-06-06 RX ORDER — ACETAMINOPHEN 325 MG/1
650 TABLET ORAL
Status: DISCONTINUED | OUTPATIENT
Start: 2018-06-06 | End: 2018-06-08 | Stop reason: HOSPADM

## 2018-06-06 RX ORDER — TRIAMCINOLONE ACETONIDE 1 MG/G
CREAM TOPICAL
Status: ON HOLD | COMMUNITY
End: 2018-08-01

## 2018-06-06 RX ORDER — LORAZEPAM 1 MG/1
1 TABLET ORAL
Status: DISCONTINUED | OUTPATIENT
Start: 2018-06-06 | End: 2018-06-08 | Stop reason: HOSPADM

## 2018-06-06 RX ORDER — ASPIRIN 81 MG/1
81 TABLET ORAL DAILY
Status: DISCONTINUED | OUTPATIENT
Start: 2018-06-06 | End: 2018-06-08 | Stop reason: HOSPADM

## 2018-06-06 RX ORDER — IBUPROFEN 200 MG
1 TABLET ORAL
Status: DISCONTINUED | OUTPATIENT
Start: 2018-06-06 | End: 2018-06-08 | Stop reason: HOSPADM

## 2018-06-06 RX ORDER — TRIAMCINOLONE ACETONIDE 1 MG/G
CREAM TOPICAL DAILY
Status: DISCONTINUED | OUTPATIENT
Start: 2018-06-06 | End: 2018-06-08 | Stop reason: HOSPADM

## 2018-06-06 RX ORDER — TAMSULOSIN HYDROCHLORIDE 0.4 MG/1
0.4 CAPSULE ORAL DAILY
Status: DISCONTINUED | OUTPATIENT
Start: 2018-06-06 | End: 2018-06-08 | Stop reason: HOSPADM

## 2018-06-06 RX ORDER — LORAZEPAM 2 MG/ML
2 INJECTION INTRAMUSCULAR
Status: DISCONTINUED | OUTPATIENT
Start: 2018-06-06 | End: 2018-06-08 | Stop reason: HOSPADM

## 2018-06-06 RX ORDER — LEVOTHYROXINE SODIUM 75 UG/1
75 TABLET ORAL
Status: DISCONTINUED | OUTPATIENT
Start: 2018-06-06 | End: 2018-06-08 | Stop reason: HOSPADM

## 2018-06-06 RX ORDER — LISINOPRIL 20 MG/1
20 TABLET ORAL DAILY
Status: DISCONTINUED | OUTPATIENT
Start: 2018-06-06 | End: 2018-06-08 | Stop reason: HOSPADM

## 2018-06-06 RX ORDER — METHADONE HYDROCHLORIDE 10 MG/ML
105 CONCENTRATE ORAL
Status: DISCONTINUED | OUTPATIENT
Start: 2018-06-06 | End: 2018-06-06

## 2018-06-06 RX ORDER — AMMONIUM LACTATE 12 G/100G
CREAM TOPICAL 2 TIMES DAILY
Status: DISCONTINUED | OUTPATIENT
Start: 2018-06-06 | End: 2018-06-06

## 2018-06-06 RX ORDER — AMMONIUM LACTATE 12 G/100G
CREAM TOPICAL 2 TIMES DAILY
Status: ON HOLD | COMMUNITY
End: 2018-08-01

## 2018-06-06 RX ORDER — ADHESIVE BANDAGE
30 BANDAGE TOPICAL DAILY PRN
Status: DISCONTINUED | OUTPATIENT
Start: 2018-06-06 | End: 2018-06-08 | Stop reason: HOSPADM

## 2018-06-06 RX ORDER — COLCHICINE 0.6 MG/1
0.6 TABLET ORAL DAILY
Status: DISCONTINUED | OUTPATIENT
Start: 2018-06-06 | End: 2018-06-06

## 2018-06-06 RX ORDER — ALLOPURINOL 100 MG/1
100 TABLET ORAL DAILY
Status: DISCONTINUED | OUTPATIENT
Start: 2018-06-06 | End: 2018-06-08 | Stop reason: HOSPADM

## 2018-06-06 RX ORDER — ZOLPIDEM TARTRATE 10 MG/1
10 TABLET ORAL
Status: DISCONTINUED | OUTPATIENT
Start: 2018-06-06 | End: 2018-06-08 | Stop reason: HOSPADM

## 2018-06-06 RX ORDER — TAMSULOSIN HYDROCHLORIDE 0.4 MG/1
0.4 CAPSULE ORAL DAILY
Status: ON HOLD | COMMUNITY
End: 2019-05-24 | Stop reason: SDUPTHER

## 2018-06-06 RX ORDER — IBUPROFEN 400 MG/1
400 TABLET ORAL
Status: DISCONTINUED | OUTPATIENT
Start: 2018-06-06 | End: 2018-06-08 | Stop reason: HOSPADM

## 2018-06-06 RX ORDER — NAPROXEN 250 MG/1
500 TABLET ORAL
Status: COMPLETED | OUTPATIENT
Start: 2018-06-06 | End: 2018-06-06

## 2018-06-06 RX ORDER — ACETAMINOPHEN 500 MG
2 TABLET ORAL
Status: DISCONTINUED | OUTPATIENT
Start: 2018-06-06 | End: 2018-06-08 | Stop reason: HOSPADM

## 2018-06-06 RX ORDER — BENZTROPINE MESYLATE 2 MG/1
2 TABLET ORAL
Status: DISCONTINUED | OUTPATIENT
Start: 2018-06-06 | End: 2018-06-08 | Stop reason: HOSPADM

## 2018-06-06 RX ORDER — BENZTROPINE MESYLATE 1 MG/ML
2 INJECTION INTRAMUSCULAR; INTRAVENOUS
Status: DISCONTINUED | OUTPATIENT
Start: 2018-06-06 | End: 2018-06-08 | Stop reason: HOSPADM

## 2018-06-06 RX ORDER — METHADONE HYDROCHLORIDE 5 MG/5ML
105 SOLUTION ORAL DAILY
Status: DISCONTINUED | OUTPATIENT
Start: 2018-06-06 | End: 2018-06-08 | Stop reason: HOSPADM

## 2018-06-06 RX ORDER — OLANZAPINE 5 MG/1
5 TABLET ORAL
Status: DISCONTINUED | OUTPATIENT
Start: 2018-06-06 | End: 2018-06-08 | Stop reason: HOSPADM

## 2018-06-06 RX ORDER — ESCITALOPRAM OXALATE 10 MG/1
5 TABLET ORAL DAILY
Status: DISCONTINUED | OUTPATIENT
Start: 2018-06-07 | End: 2018-06-08 | Stop reason: HOSPADM

## 2018-06-06 RX ORDER — TAMSULOSIN HYDROCHLORIDE 0.4 MG/1
0.4 CAPSULE ORAL
Status: COMPLETED | OUTPATIENT
Start: 2018-06-06 | End: 2018-06-06

## 2018-06-06 RX ORDER — LISINOPRIL 20 MG/1
20 TABLET ORAL DAILY
Status: ON HOLD | COMMUNITY
End: 2019-05-10 | Stop reason: SDUPTHER

## 2018-06-06 RX ADMIN — LEVOTHYROXINE SODIUM 75 MCG: 75 TABLET ORAL at 12:46

## 2018-06-06 RX ADMIN — ALLOPURINOL 100 MG: 100 TABLET ORAL at 12:47

## 2018-06-06 RX ADMIN — ZOLPIDEM TARTRATE 10 MG: 10 TABLET ORAL at 21:45

## 2018-06-06 RX ADMIN — NAPROXEN 500 MG: 250 TABLET ORAL at 00:26

## 2018-06-06 RX ADMIN — TAMSULOSIN HYDROCHLORIDE 0.4 MG: 0.4 CAPSULE ORAL at 12:46

## 2018-06-06 RX ADMIN — ASPIRIN 81 MG: 81 TABLET, COATED ORAL at 12:47

## 2018-06-06 RX ADMIN — METHADONE HYDROCHLORIDE 105 MG: 5 SOLUTION ORAL at 13:42

## 2018-06-06 RX ADMIN — LISINOPRIL 20 MG: 20 TABLET ORAL at 12:47

## 2018-06-06 RX ADMIN — Medication: at 12:48

## 2018-06-06 RX ADMIN — TRIAMCINOLONE ACETONIDE: 1 CREAM TOPICAL at 12:48

## 2018-06-06 RX ADMIN — TAMSULOSIN HYDROCHLORIDE 0.4 MG: 0.4 CAPSULE ORAL at 00:26

## 2018-06-06 NOTE — ED NOTES
Bedside and Verbal shift change report given to Emir Oliva RN (oncoming nurse) by Mary Francois RN (offgoing nurse). Report included the following information SBAR and ED Summary.

## 2018-06-06 NOTE — PROGRESS NOTES
NUTRITION       Nutrition screening referral was triggered based on results obtained during nursing admission assessment for \"2-13# wt loss\". The patient's chart was reviewed and nutrition assessment is not indicated at this time. Wt has been stable between 220-230# for past 6 months with current wt from standing scale. Wt Readings from Last 10 Encounters:   06/05/18 102.3 kg (225 lb 8.5 oz)   06/04/18 104.3 kg (230 lb)   03/13/18 100.4 kg (221 lb 5.5 oz)   03/14/18 100.2 kg (221 lb)   03/06/18 106.1 kg (234 lb)   12/21/17 94.3 kg (208 lb)   08/13/17 94.3 kg (208 lb)   01/27/17 99.2 kg (218 lb 9.6 oz)   01/06/17 99.3 kg (219 lb)   12/13/16 105.1 kg (231 lb 12.8 oz)     Please consult if appetite poor or wt loss this admit. Thank you.      Vic Barrett RD

## 2018-06-06 NOTE — BSMART NOTE
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 The Medical Doctor is in agreement with Psychiatrist disposition because of (reason) patient meets criteria and is a voluntary admission. [facility] [address] [address] Nurse Report [phone]  The on-call Psychiatrist consulted was Dr. Va Jones. The admitting Psychiatrist will be Dr. Va Jones. The admitting Diagnosis is Depressive Disorder NOS. The Payor source is [employer]ICA[employer]/[facility] [employer]. The name of the representative was . This was approved for  days. The authorization number is .      Section II - Integrated Summary  Summary:   (Assessment from 6/5/18)Patient is 47year old  male reporting to ED patient had bladder tumor removed 4/25 and began having urinary bleeding again this morning with suprapubic pain. no retention per patient.  was asked to see patient after he would not answer ED provider question about suicidal thoughts but verbalized needing some help. Assessment was completed via tele psych, patient reported having suicidal thoughts but verbalized he did not want to hurt himself but he feels that he would. Patient stated he did not think it was safe for him to be alone right now. Patient denied homicidal thoughts and hallucinations.  Patient stated that his mother  from Cancer when he was 14 and there is a history of Cancer on both sides of family. Patient reported that he has been afraid of this his entire life and now he has to deal with it. Patient verbalized feeling that he cannot live like this. Patient reported that he is depressed, he has had difficulty sleeping. Patient has not been admitted psychiatrically. The patient reported that he has not told anyone about his cancer because he does not want to bother them mainly his children. Patient does not have support system. Patient reported he has pain management services with [facility] daily due to history of pain. Patient acknowledged that it has been a hard pill to swallow and he was emotional during assessment. 18: Patient was discharged as reported earlier today with plan to go home with care giver who as reported agreed to help patient. The caregiver took the patient's prescription as reported to get filled before he was being discharged and she did not return. ED has called caregiver, this  has called caregiver and she hasnot returned call. The patient is presenting to ED stating that he does not think he should be alone; as reported he cannot get into home where he lives with caregiver anyways because she has the key. The client reported increased depression. When asked if he was suicidal, patient stated \"I cannot say yes and I cannot say no, but I should not be alone\". Patient denied homicidal thoughts and hallucinations. Patient stated that he is very scared about having cancer. The patient has demonstrated mental capacity to provide informed consent. The information is given by the patient. The Chief Complaint is urinary bleeding, suicidal.  The Precipitant Factors are health issues. Previous Hospitalizations: no  The patient has not previously been in restraints.   Current Psychiatrist and/or  is none.     Lethality Assessment:     The potential for suicide noted by the following: ideation . The potential for homicide is not noted. The patient has not been a perpetrator of sexual or physical abuse. There are not pending charges. The patient is not felt to be at risk for self harm or harm to others. The attending nurse was advised not noted.     Section III - Psychosocial  The patient's overall mood and attitude is emotional. Low mood, cooperative. Feelings of helplessness and hopelessness are not observed. Generalized anxiety is not observed. Panic is not observed. Phobias are not observed. Obsessive compulsive tendencies are not observed.       Section IV - Mental Status Exam  The patient's appearance shows no evidence of impairment. The patient's behavior shows no evidence of impairment. The patient is oriented to time, place, person and situation. The patient's speech shows no evidence of impairment. The patient's mood is depressed. The range of affect shows no evidence of impairment. The patient's thought content demonstrates no evidence of impairment. The thought process shows no evidence of impairment. The patient's perception shows no evidence of impairment. The patient's memory shows no evidence of impairment. The patient's appetite shows no evidence of impairment. The patient's sleep shows no evidence of impairment. The patient's insight shows no evidence of impairment. The patient's judgement shows no evidence of impairment.                         Section V - Substance Abuse  The patient is using substances. The patient is using tobacco by inhalation for greater than 10 years with last use on yesterday. The patient has experienced the following withdrawal symptoms: N/A.        Section VI - Living Arrangements  The patient is . The patient lives alone. The patient has 2 children ages adult. The patient does plan to return home upon discharge.   The patient does not have legal issues pending. The patient's source of income comes from disability. Catholic and cultural practices have not been voiced at this time.     The patient's greatest support comes from no one and this person will not be involved with the treatment. The patient has not been in an event described as horrible or outside the realm of ordinary life experience either currently or in the past.  The patient has not been a victim of sexual/physical abuse.     Section VII - Other Areas of Clinical Concern  The highest grade achieved is unknown with the overall quality of school experience being described as unknown. The patient is currently unemployed and speaks [place] as a primary language. The patient has no communication impairments affecting communication. The patient's preference for learning can be described as: can read and write adequately.   The patient's hearing is normal.  The patient's vision is normal.        Kristine Rosa

## 2018-06-06 NOTE — BH NOTES
PSYCHOSOCIAL ASSESSMENT    Patient identifying info:  Kierra Connolly is a 47 y.o., male admitted 6/6/2018  8:24 AM     Presenting problem and precipitating factors: Patient was transferred to [place] [address] from [facility] ED c/o depression. Pt came to the ED initially for urine retention, but Pt started discussing his depression in light of bladder cancer diagnosis and CVA. Pt stated that he felt unsafe being alone. Pt's caregiver left the ED to get his prescription filled, but never returned and could not be reached by phone. Pt endorsed feeling fearful, upset, and unsafe. Mental status assessment: Irritable, depressed    Current psychiatric/substance abuse providers and contact info: Dr. Deneen Pineda (PCP)    Previous psychiatric or substance abuse services/providers and response to treatment: Multiple previous inpatient psychiatric hospitalizations     Family history of substance abuse or mental illness: Depression; alcoholism    Substance abuse history: ETOH in remission; prescribed opiates; methadone  Social History   Substance Use Topics    Smoking status: Current Every Day Smoker     Packs/day: 1.00    Smokeless tobacco: Never Used    Alcohol use 8.4 oz/week     14 Cans of beer per week       History of biomedical complications associated with substance abuse:  CVA, tremors, seizure    Patient's current acceptance of treatment or motivation for change: Ambivalent; states he has already quit    Family constellation: None    Is significant other involved? No    Describe support system: Caregiver    Describe living arrangements and home environment: Lives with caregiver.     Health issues:   Hospital Problems  Date Reviewed: 5/9/2018          Codes Class Noted POA    Major depression ICD-10-CM: F32.9  ICD-9-CM: 296.20  6/6/2018 Unknown        Depression ICD-10-CM: F32.9  ICD-9-CM: 278  6/6/2018 Unknown              Trauma history: Lost both parents to cancer and is now diagnosed with cancer; victim of CVA due to ETOH withdrawal    Legal issues: None indicated    History of  service: No    Financial status: SSDI    Pentecostalism/cultural factors: Religious    Education/work history:  Unemployed on disability    Have you been licensed as a health care professional (current or ): No  Leisure and recreation preferences: None  Describe coping skills: Ineffectual and poor judgement    Perry Barriga  2018

## 2018-06-06 NOTE — IP AVS SNAPSHOT
[address] 
[phone] Patient: Sergio Rosa MRN: [id number] :1965 About your hospitalization You were admitted on:  2018 You last received care in the:  [address] You were discharged on:  2018 Why you were hospitalized Your primary diagnosis was: Major Depression Your diagnoses also included:  Uncomplicated Opioid Dependence (Hcc) Follow-up Information Follow up With Details Comments Contact Info Ruthy Foy MD   [address] [facility] 7 [id number] 
[phone] [facility] Go on 2018 Patient will discharge directly to [facility]. Issac Yarbrough [address] [facility] 
[phone] Discharge Orders None A check valentine indicates which time of day the medication should be taken. My Medications START taking these medications Instructions Each Dose to Equal  
 Morning Noon Evening Bedtime  
 escitalopram oxalate 5 mg tablet Commonly known as:  Esther Mealing Start taking on:  2018 Your next dose is:  Tomorrow 9am  
   
 Take 1 Tab by mouth daily. Indications: Generalized Anxiety Disorder, major depressive disorder 5 mg CONTINUE taking these medications Instructions Each Dose to Equal  
 Morning Noon Evening Bedtime  
 allopurinol 100 mg tablet Commonly known as:  Coleman Riedel Your next dose is:  Tomorrow 9am  
   
 Take 1 Tab by mouth daily. 100 mg  
    
  
   
   
   
  
 ammonium lactate 12 % topical cream  
Commonly known as:  AMLACTIN Your next dose is: Today at 9pm  
   
 Apply  to affected area two (2) times a day. rub in to affected area well   Indications: DRY SKIN  
     
  
   
   
  
   
  
 aspirin delayed-release 81 mg tablet Your next dose is:  Tomorrow 9am  
   
 Take 81 mg by mouth daily.   
 81 mg  
    
  
   
   
   
  
 colchicine 0.6 mg tablet Commonly known as:  COLCRYS Take 1 Tab by mouth daily as needed. For gout flare 0.6 mg  
    
   
   
   
  
 levothyroxine 75 mcg tablet Commonly known as:  SYNTHROID Your next dose is:  Tomorrow before you eat breakfast  
   
 Take 1 Tab by mouth Daily (before breakfast). 75 mcg  
    
  
   
   
   
  
 lisinopril 20 mg tablet Commonly known as:  Karn Desanctis Your next dose is:  Tomorrow 9am  
   
 Take 20 mg by mouth daily. Indications: hypertension 20 mg METHADONE PO Your next dose is:  Tomorrow 9am  
   
 Take 105 mg by mouth daily. Patient takes liquid methadone. 105 mg  
    
  
   
   
   
  
 tamsulosin 0.4 mg capsule Commonly known as:  FLOMAX Your next dose is:  Tomorrow 9am  
   
 Take 0.4 mg by mouth daily. Indications: bladder cancer 0.4 mg  
    
  
   
   
   
  
 triamcinolone acetonide 0.1 % topical cream  
Commonly known as:  KENALOG Your next dose is:  Tomorrow 9am  
   
 Apply  to affected area. use thin layer Where to Get Your Medications Information on where to get these meds will be given to you by the nurse or doctor. ! Ask your nurse or doctor about these medications  
  escitalopram oxalate 5 mg tablet Opioid Education Prescription Opioids: What You Need to Know: 
 
Prescription opioids can be used to help relieve moderate-to-severe pain and are often prescribed following a surgery or injury, or for certain health conditions. These medications can be an important part of treatment but also come with serious risks. Opioids are strong pain medicines. Examples include hydrocodone, oxycodone, fentanyl, and morphine. Heroin is an example of an illegal opioid. It is important to work with your health care provider to make sure you are getting the safest, most effective care. WHAT ARE THE RISKS AND SIDE EFFECTS OF OPIOID USE? Prescription opioids carry serious risks of addiction and overdose, especially with prolonged use. An opioid overdose, often marked by slow breathing, can cause sudden death. The use of prescription opioids can have a number of side effects as well, even when taken as directed. · Tolerance-meaning you might need to take more of a medication for the same pain relief · Physical dependence-meaning you have symptoms of withdrawal when the medication is stopped. Withdrawal symptoms can include nausea, sweating, chills, diarrhea, stomach cramps, and muscle aches. Withdrawal can last up to several weeks, depending on which drug you took and how long you took it. · Increased sensitivity to pain · Constipation · Nausea, vomiting, and dry mouth · Sleepiness and dizziness · Confusion · Depression · Low levels of testosterone that can result in lower sex drive, energy, and strength · Itching and sweating RISKS ARE GREATER WITH:      
· History of drug misuse, substance use disorder, or overdose · Mental health conditions (such as depression or anxiety) · Sleep apnea · Older age (72 years or older) · Pregnancy Avoid alcohol while taking prescription opioids. Also, unless specifically advised by your health care provider, medications to avoid include: · Benzodiazepines (such as Xanax or Valium) · Muscle relaxants (such as Soma or Flexeril) · Hypnotics (such as Ambien or Lunesta) · Other prescription opioids KNOW YOUR OPTIONS Talk to your health care provider about ways to manage your pain that don't involve prescription opioids. Some of these options may actually work better and have fewer risks and side effects. Options may include: 
· Pain relievers such as acetaminophen, ibuprofen, and naproxen · Some medications that are also used for depression or seizures · Physical therapy and exercise · Counseling to help patients learn how to cope better with triggers of pain and stress. · Application of heat or cold compress · Massage therapy · Relaxation techniques Be Informed Make sure you know the name of your medication, how much and how often to take it, and its potential risks & side effects. IF YOU ARE PRESCRIBED OPIOIDS FOR PAIN: 
· Never take opioids in greater amounts or more often than prescribed. Remember the goal is not to be pain-free but to manage your pain at a tolerable level. · Follow up with your primary care provider to: · Work together to create a plan on how to manage your pain. · Talk about ways to help manage your pain that don't involve prescription opioids. · Talk about any and all concerns and side effects. · Help prevent misuse and abuse. · Never sell or share prescription opioids · Help prevent misuse and abuse. · Store prescription opioids in a secure place and out of reach of others (this may include visitors, children, friends, and family). · Safely dispose of unused/unwanted prescription opioids: Find your community drug take-back program or your pharmacy mail-back program, or flush them down the toilet, following guidance from the Food and Drug Administration (www.fda.gov/Drugs/ResourcesForYou). · Visit www.cdc.gov/drugoverdose to learn about the risks of opioid abuse and overdose. · If you believe you may be struggling with addiction, tell your health care provider and ask for guidance or call [address] [place] [address] at 4-982-282-MKMS. Discharge Instructions DISCHARGE SUMMARY:  Substance Abuse [place] Spatz : 1965 MRN: [id number] The patient Mona Paula exhibits the ability to function in a less restrictive environment. There has been no evidence of withdrawal for the past 24 hours. No suicidal/homicidal threat or behavior has been observed for the past 24 hours. If weapons involved, how are they secured? No weapons involved. Is patient aware of and in agreement with discharge plan? Yes Arrangements for medication:  Prescriptions given to patient. Referral for substance treatment? Patient is not using substances/Not applicable. Referral for smoking cessation needed? Yes, refused Copy of discharge instructions to provider?:  [facility]; [facility] Arrangements for transportation home:  Taxi to [address] [place] [address] crisis number:  866 or your local mental health crisis line number at [phone]. DISCHARGE SUMMARY from Nurse PATIENT INSTRUCTIONS: Left foot: remove dressing, apply thin layer of 2% lidocaine jelly and let it rest for a couple of minutes, clean with saline, apply Aquacel AG with dry cover.  
  
 
What to do at Home: 
Recommended activity: Activity as tolerated If you experience any of the following symptoms feeling overwhelmed with hopelessness, please follow up with your assigned providers and local crisis number at [phone]. *  Please give a list of your current medications to your Primary Care Provider. *  Please update this list whenever your medications are discontinued, doses are 
    changed, or new medications (including over-the-counter products) are added. *  Please carry medication information at all times in case of emergency situations. These are general instructions for a healthy lifestyle: No smoking/ No tobacco products/ Avoid exposure to second hand smoke Surgeon General's Warning:  Quitting smoking now greatly reduces serious risk to your health. Obesity, smoking, and sedentary lifestyle greatly increases your risk for illness A healthy diet, regular physical exercise & weight monitoring are important for maintaining a healthy lifestyle You may be retaining fluid if you have a history of heart failure or if you experience any of the following symptoms:  Weight gain of 3 pounds or more overnight or 5 pounds in a week, increased swelling in our hands or feet or shortness of breath while lying flat in bed. Please call your doctor as soon as you notice any of these symptoms; do not wait until your next office visit. Recognize signs and symptoms of STROKE: 
 
F-face looks uneven A-arms unable to move or move unevenly S-speech slurred or non-existent T-time-call 911 as soon as signs and symptoms begin-DO NOT go Back to bed or wait to see if you get better-TIME IS BRAIN. Warning Signs of HEART ATTACK Call 911 if you have these symptoms: 
? Chest discomfort. Most heart attacks involve discomfort in the center of the chest that lasts more than a few minutes, or that goes away and comes back. It can feel like uncomfortable pressure, squeezing, fullness, or pain. ? Discomfort in other areas of the upper body. Symptoms can include pain or discomfort in one or both arms, the back, neck, jaw, or stomach. ? Shortness of breath with or without chest discomfort. ? Other signs may include breaking out in a cold sweat, nausea, or lightheadedness. Don't wait more than five minutes to call [address]! Fast action can save your life. Calling 911 is almost always the fastest way to get lifesaving treatment. Emergency Medical Services staff can begin treatment when they arrive  up to an hour sooner than if someone gets to the hospital by car. The discharge information has been reviewed with the patient. The patient verbalized understanding. Discharge medications reviewed with the patient and appropriate educational materials and side effects teaching were provided. ___________________________________________________________________________________________________________________________________ Blood in the Urine: Care Instructions Your Care Instructions Blood in the urine, or hematuria, may make the urine look red, brown, or pink. There may be blood every time you urinate or just from time to time. You cannot always see blood in the urine, but it will show up in a urine test. 
Blood in the urine may be serious. It should always be checked by a doctor. Your doctor may recommend more tests, including an X-ray, a CT scan, or a cystoscopy (which lets a doctor look inside the urethra and bladder). Blood in the urine can be a sign of another problem. Common causes are bladder infections and kidney stones. An injury to your groin or your genital area can also cause bleeding in the urinary tract. Very hard exercise-such as running a marathon-can cause blood in the urine. Blood in the urine can also be a sign of kidney disease or cancer in the bladder or kidney. Many cases of blood in the urine are caused by a harmless condition that runs in families. This is called benign familial hematuria. It does not need any treatment. Sometimes your urine may look red or brown even though it does not contain blood. For example, not getting enough fluids (dehydration), taking certain medicines, or having a liver problem can change the color of your urine. Eating foods such as beets, rhubarb, or blackberries or foods with red food coloring can make your urine look red or pink. Follow-up care is a key part of your treatment and safety. Be sure to make and go to all appointments, and call your doctor if you are having problems. It's also a good idea to know your test results and keep a list of the medicines you take. When should you call for help? Call your doctor now or seek immediate medical care if: 
· You have symptoms of a urinary infection. For example: ¨ You have pus in your urine. ¨ You have pain in your back just below your rib cage. This is called flank pain. ¨ You have a fever, chills, or body aches. ¨ It hurts to urinate. ¨ You have groin or belly pain. · You have more blood in your urine. Watch closely for changes in your health, and be sure to contact your doctor if: 
· You have new urination problems. · You do not get better as expected. Where can you learn more? Go to http://joan-arvin.info/. Enter W895 in the search box to learn more about \"Blood in the Urine: Care Instructions. \" Current as of: May 12, 2017 Content Version: 11.4 © 9929-0552 [employer]. Care instructions adapted under license by [employer] (which disclaims liability or warranty for this information). If you have questions about a medical condition or this instruction, always ask your healthcare professional. [address] any warranty or liability for your use of this information. 
  
 
 
 
  
  
  
Introducing [facility]! Dear Nessa Spain: Thank you for requesting a [employer] account. Our records indicate that you already have an active [employer] account. You can access your account anytime at [email or web address] Did you know that you can access your hospital and ER discharge instructions at any time in [employer]? You can also review all of your test results from your hospital stay or ER visit. Additional Information If you have questions, please visit the Frequently Asked Questions section of the [employer] website at [email or web address] Remember, [employer] is NOT to be used for urgent needs. For medical emergencies, dial 911. Now available from your iPhone and Android! Introducing Zachery Dukes As a [facility] patient, I wanted to make you aware of our electronic visit tool called Zachery Dukes. [facility] 24/7 allows you to connect within minutes with a medical provider 24 hours a day, seven days a week via a mobile device or tablet or logging into a secure website from your computer. You can access Zachery Dukes from anywhere in the [place]. A virtual visit might be right for you when you have a simple condition and feel like you just dont want to get out of bed, or cant get away from work for an appointment, when your regular [facility] provider is not available (evenings, weekends or holidays), or when youre out of town and need minor care. Electronic visits cost only $49 and if the [facility] 24/7 provider determines a prescription is needed to treat your condition, one can be electronically transmitted to a nearby pharmacy*. Please take a moment to enroll today if you have not already done so. The enrollment process is free and takes just a few minutes. To enroll, please download the [employer] katia to your tablet or phone, or visit [email or web address] to enroll on your computer. And, as an [address] [place] [address] patient with a [employer] account, the results of your visits will be scanned into your electronic medical record and your primary care provider will be able to view the scanned results. We urge you to continue to see your regular [facility] provider for your ongoing medical care. And while your primary care provider may not be the one available when you seek a Zachery Dukes virtual visit, the peace of mind you get from getting a real diagnosis real time can be priceless. For more information on Zachery Dukes, view our Frequently Asked Questions (FAQs) at [email or web address]. Sincerely, 
 
Bossman Trivedi MD 
Chief Medical Officer [employer] *:  certain medications cannot be prescribed via Zachery Dukes Providers Seen During Your Hospitalization Provider Specialty Primary office phone Lindsay Garber MD Psychiatry [phone] Your Primary Care Physician (PCP) Primary Care Physician Office Phone Office Fax Harpal Campos [phone] You are allergic to the following Allergen Reactions Sulfamethoxazole-Trimethoprim Rash L eye and L hand Ciprofloxacin Hives Per pcp records Codeine Hives Tolerates dilaudid, oxycodone Lyrica (Pregabalin) Hives Sulfa (Sulfonamide Antibiotics) Rash Ultram (Tramadol) Hives Recent Documentation Smoking Status Current Every Day Smoker Emergency Contacts Name Discharge Info Relation Home Work Mobile Reina Henry DISCHARGE CAREGIVER [3] Other Relative [6] [phone] Dallin Valero DISCHARGE CAREGIVER [3] Other Relative [6] [phone] Juaquin Wilson CAREGIVER [3] Other Relative [6] [phone]    
 Ruma Dailey  Other Relative [6] [phone] Patient Belongings The following personal items are in your possession at time of discharge: 
  Dental Appliances: None  Visual Aid: None      Home Medications: None   Jewelry: None  Clothing: Belt, Footwear, Shirt, Shorts (shirt,shorts,cane(w/pt) 1 shoe,belt,comb(in closet))    Other Valuables: Cane, [employer], Cigarettes, Eyeglasses, Lighter/matches, Money (comment), Wallet (cig,lighter,cell,chrgr,wallet w/ids(MR))  Personal Items Sent to Safe: 2cc (2cc) Discharge Instructions Attachments/References HEMATURIA (ENGLISH) Patient Handouts Blood in the Urine: Care Instructions Your Care Instructions Blood in the urine, or hematuria, may make the urine look red, brown, or pink. There may be blood every time you urinate or just from time to time. You cannot always see blood in the urine, but it will show up in a urine test. 
Blood in the urine may be serious. It should always be checked by a doctor. Your doctor may recommend more tests, including an X-ray, a CT scan, or a cystoscopy (which lets a doctor look inside the urethra and bladder). Blood in the urine can be a sign of another problem.  Common causes are bladder infections and kidney stones. An injury to your groin or your genital area can also cause bleeding in the urinary tract. Very hard exercise-such as running a marathon-can cause blood in the urine. Blood in the urine can also be a sign of kidney disease or cancer in the bladder or kidney. Many cases of blood in the urine are caused by a harmless condition that runs in families. This is called benign familial hematuria. It does not need any treatment. Sometimes your urine may look red or brown even though it does not contain blood. For example, not getting enough fluids (dehydration), taking certain medicines, or having a liver problem can change the color of your urine. Eating foods such as beets, rhubarb, or blackberries or foods with red food coloring can make your urine look red or pink. Follow-up care is a key part of your treatment and safety. Be sure to make and go to all appointments, and call your doctor if you are having problems. It's also a good idea to know your test results and keep a list of the medicines you take. When should you call for help? Call your doctor now or seek immediate medical care if: 
· You have symptoms of a urinary infection. For example: ¨ You have pus in your urine. ¨ You have pain in your back just below your rib cage. This is called flank pain. ¨ You have a fever, chills, or body aches. ¨ It hurts to urinate. ¨ You have groin or belly pain. · You have more blood in your urine. Watch closely for changes in your health, and be sure to contact your doctor if: 
· You have new urination problems. · You do not get better as expected. Where can you learn more? Go to http://joan-arvin.info/. Enter A149 in the search box to learn more about \"Blood in the Urine: Care Instructions. \" Current as of: May 12, 2017 Content Version: 11.4 © 4821-6064 [employer].  Care instructions adapted under license by [address] (which disclaims liability or warranty for this information). If you have questions about a medical condition or this instruction, always ask your healthcare professional. [address] any warranty or liability for your use of this information. Please provide this summary of care documentation to your next provider. Signatures-by signing, you are acknowledging that this After Visit Summary has been reviewed with you and you have received a copy. Patient Signature:  ____________________________________________________________ Date:  ____________________________________________________________  
  
Mystic Harvinder Provider Signature:  ____________________________________________________________ Date:  ____________________________________________________________

## 2018-06-06 NOTE — BH NOTES
Primary Nurse Idris Handley RN and Lelia Hwang RN performed a dual skin assessment on this patient Impairment noted- see wound doc flow sheet  Dejuan score is 23     Patient has a skin rash on left arm, and incision on left foot from recent foot surgery. Foot is bandaged and patient refused to allow staff to see. Hospitalist will assess. Patient was admitted to unit by Dr. Kayden Bermudez for depressive d/o. Patient arrived on stretcher stating, \"I can't walk until I get my pain medicine. \"  Patient is cooperative, defensive, denies si/hi. Patient was able to stand  with use of his cane and sat in apurva chair.

## 2018-06-06 NOTE — ED NOTES
Patient stating he will refuse transfer to [facility] without dose of Methadone. Charge nurse aware and in to speak with patient.

## 2018-06-06 NOTE — ED NOTES
Patient refuses straight cath for urine specimen. States \"just tried to [employer]    \"I should be able to go soon. \"    Patient to be admitted to room 729B at [facility].      Bedside and Verbal shift change report given to [address] Te[address] Lowry (oncoming nurse) by [facility] (offgoing nurse). Report included the following information SBAR, Kardex and MAR.

## 2018-06-06 NOTE — PROGRESS NOTES
Problem: Depressed Mood (Adult/Pediatric)  Goal: *STG: Participates in treatment plan  Outcome: Progressing Towards Goal  Patient participates in treatment plan. Patient has sad affect, depressed, cooperative, irritated. Treatment, medications discussed. Patient 's history discussed. Patient is isolative to bed, meal compliant, denies si/hi, remains on Q15 min safety checks.

## 2018-06-06 NOTE — CONSULTS
Hospitalist H&P Note         Vero Kinsey NP   [phone]  Call physician on-call through the  7pm-7am    Primary Care Provider: Yasir Barnett MD  Source of Information: Patient and medical record    History of Presenting Illness:    47 y.o. male with PMHx significant for hypertension, CVA, hypercholesterolemia, thromboembolus, bladder cancer, presented to [facility] ED for evaluation of continued depression. Per chart pt was seen at same ED about 12 hours priorfor urinary retention, but during his visit he expressed depressive feelings as well as saying \"I don't want to be here any longer\". Patient continued feelings of depression, therefore patient was transferred to [facility] inpatient psych for further management. Patient denies any thoughts of wanting to hurt himself. Patient currently c/o of LUE rash. Reports rash started when he was in the hospital in April and has progressed since. He has been following his primary care doctor for this. Reports he was given a script last week for a cream that he had not picked up yet from his pharmacy. Patient also notes same rash on Right upper thigh. Patient also notes chronic left foot ulcer. Per chart review patient has had extensive work up of ulcer to include vascular surgery, orthopedic surgery and infectious disease work ups. He notes the wound has actually improved with soaking it in baking soda. Patient currently denies any urinary retention states he voided about 2 hours ago. No other acute complaints.         Review of Systems:  General: negative for fever, chills, sweats, weakness, weight loss  Eyes: negative for changes or loss in vision, eye pain  Ear Nose and Throat: negative for rhinorrhea, otalgia, speech or swallowing difficulties  Respiratory:  negative for cough, sputum production, SOB, wheezing, BECK  Cardiology:  negative for chest pain, palpitations, orthopnea, edema, syncope   Gastrointestinal: negative for abdominal pain, N/V, change in bowel habits, bleeding  Genitourinary: +urinary retention, bladder cancer. Musculoskeletal : chronic left sided pain  Skin: chronic left foot wound, LLE rash, Right upper thigh rash  Endocrine: negative for hot flashes or polydipsia  Neurological: negative for headache, dizziness + chronic L sided hemiparesis   Psychological: +depression      Past Medical History:   Diagnosis Date    Aneurysm ([facility] [address]     (with stroke)    Bladder tumor     Family history of bladder cancer     Gout     Hypercholesterolemia     Hypertension     Lesion of bladder     Seizures ([facility] [address] [facility]     Stroke ([facility] [address] 2006    left sided weakness    Thromboembolus ([facility] [address] [facility]     Thyroid disease     TIA (transient ischemic attack) 2012      Past Surgical History:   Procedure Laterality Date    HX UROLOGICAL  04/20/2018    Cystoscopy, TURBT (greater than 5 cm resected) Dr. America Aguilar, [facility].  KNEE ARTHROSCP HARV      left knee     Prior to Admission medications    Medication Sig Start Date End Date Taking? Authorizing Provider   lisinopril (PRINIVIL, ZESTRIL) 20 mg tablet Take 20 mg by mouth daily. Indications: hypertension    Historical Provider   ammonium lactate (AMLACTIN) 12 % topical cream Apply  to affected area two (2) times a day. rub in to affected area well   Indications: DRY SKIN    Historical Provider   tamsulosin (FLOMAX) 0.4 mg capsule Take 0.4 mg by mouth daily. Indications: bladder cancer    Historical Provider   triamcinolone acetonide (KENALOG) 0.1 % topical cream Apply  to affected area. use thin layer    Historical Provider   aspirin delayed-release 81 mg tablet Take 81 mg by mouth daily. Historical Provider   METHADONE HCL (METHADONE PO) Take 105 mg by mouth daily. Patient takes liquid methadone. Historical Provider   colchicine (COLCRYS) 0.6 mg tablet Take 1 Tab by mouth daily as needed. For gout flare 1/12/18   Emily Jones MD   allopurinol (ZYLOPRIM) 100 mg tablet Take 1 Tab by mouth daily.  1/12/18   Emliy Jones MD levothyroxine (SYNTHROID) 75 mcg tablet Take 1 Tab by mouth Daily (before breakfast). 1/12/18   Grace June MD     Allergies   Allergen Reactions    Sulfamethoxazole-Trimethoprim Rash     L eye and L hand    Ciprofloxacin Hives     Per pcp records    Codeine Hives     Tolerates dilaudid, oxycodone    Lyrica [Pregabalin] Hives    Sulfa (Sulfonamide Antibiotics) Rash    Ultram [Tramadol] Hives      Family History   Problem Relation Age of Onset    Diabetes Father     Diabetes Sister     Diabetes Brother     Cancer Paternal Grandfather      Bladder        SOCIAL HISTORY:  Patient resides:  Independently X   Assisted Living    SNF    With family care       Smoking history:   None    Former    Chronic X     Alcohol history:   None X   Social    Chronic      Ambulates:   Independently    w/cane X   w/walker    w/wc      CODE STATUS:  DNR    Full X   Other      Objective:   Physical Exam:   Visit Vitals    BP (!) 145/99    Pulse (!) 57    Temp 98.4 °F (36.9 °C)    Resp 18    SpO2 95%           General:  Alert, cooperative, no distress, appears stated age. HEENT:  Normocephalic, atraumatic. Conjunctivae/corneas clear. PERRL, EOMs intact. Nares nl. Septum midline. Mucosa nl. No drainage or sinus tenderness. Lips, mucosa, and tongue nl. Teeth and gums nl. Neck: Supple, symmetrical, trachea midline,    Back:   Symmetric, no curvature. ROM nl. No CVA tenderness. Lungs:   Clear to auscultation bilaterally. No Wheezing/Rhonchi/Rales. No SOB, no accessory muscle use    Heart:  Regular rate and rhythm, S1, S2 nl, no murmur, click, rub or gallop. Abdomen:   Soft, non-tender. Bowel sounds nl. Extremities: LUE/LLE weakness. Pulses 2+ and symmetric    Skin: Left foot wound with dsg intact (declined allowing me to see wound wants to wait wound care) see below for Right upper thigh rash and LUE rash               Psych: Cooperative, not anxious or agitated. A/O x 3.     Neurologic: CNII-XII grossly intact. no focal neurological deficits,  moving all extremities, speech clear        Data Review:   Recent Days:  Recent Labs      06/05/18   0605   WBC  10.9   HGB  11.8*   HCT  35.6*   PLT  262     Recent Labs      06/05/18   0605   NA  140   K  3.9   CL  107   CO2  26   GLU  95   BUN  10   CREA  0.69*   CA  8.8   ALB  3.7   SGOT  9*   ALT  15     No results for input(s): PH, PCO2, PO2, HCO3, FIO2 in the last 72 hours.     Imaging:     Assessment & Plan     Active Problems:    Major depression (6/6/2018)      Depression (6/6/2018)    Depression   -mgmt per primary team     Urinary Retention  -continue Flomax  -check post void residual    Bladder Cancer   -scheduled for OP surgery on 6/19    Chronic Pain Syndrome  -continue home Methadone    H/o CVA with residual left sided weakness  -continue ASA    Gout    -continue allopurinol    Chronic Left Foot Ulcer  -recent w/u in 3/2018, has had extensive vascular eval to include negative EUGENE and vascular surgery consults who thought to be no vascular compromise   -WBC wnl, no fevers or chills, does seem to be active infection  -consult wound care    Rash of LLE and Right Thigh   -has worsened since 4/20  -will try Kenalog cream prescribed by the pcp  -wound care consult    HTN   -continue home Lisinopril     Hypothyroidism  -continue home Synthroid    Tobacco Use  -cessation education provided   -prn Nicotine patch       Signed By: Argentina Zarate NP     June 6, 2018

## 2018-06-06 NOTE — BH NOTES
TRANSFER - IN REPORT:    Verbal report received from Zaida Friedman RN(name) on Rafael Lambert  being received from [address] ER(unit) for routine progression of care      Report consisted of patients Situation, Background, Assessment and   Recommendations(SBAR). Information from the following report(s) SBAR, ED Summary, Intake/Output, MAR and Recent Results was reviewed with the receiving nurse. Opportunity for questions and clarification was provided. Assessment completed upon patients arrival to unit and care assumed.

## 2018-06-06 NOTE — ED NOTES
Bladder scan completed; approximated 141 mL of urine in patient's bladder on scan. Patient verbalized he urinated about 1 hour before signing into be seen in the ED. Patient complaints of pain to the area where \"the doctor butchered me. \"  Patient informed need a urine specimen. Urinal at bedside.

## 2018-06-06 NOTE — ED NOTES
Bedside and Verbal shift change received from Wendie Vidal RN. Report included the following information: SBAR, ED Summary, MAR and Recent Results.

## 2018-06-06 NOTE — ED NOTES
Assumed care of pt. Pt. resting comfortably in bed, denies needs at this time. Pt encouraged to provide UA as soon as possible. WCTM. Bed locked and low, call bell in reach. Pt to be transferred to [facility], Room 729B. Dr Leilani Martinez. Contact [phone] for report. [employer] contact [employer].

## 2018-06-06 NOTE — ED NOTES
Patient discharged with phone cord in pocket and personal belongings in hand. EMS advised that patient should have belongings removed from possession due to safety concerns.  EMS aware and okay with patient carrying belongings at this time

## 2018-06-06 NOTE — ED NOTES
Patient brought to room & notes having pain 8-9/10 in lower abdomen & notes that he is scared of dying with bladder cancer. Patient given a urinal to void & talked with PA.  Will have [facility]SM[facility] see patient

## 2018-06-06 NOTE — BH NOTES
Hospitalist triage paged for H/P on this patient. Katie Solis NP responded to page and stated that someone from hospitalist service will be up this afternoon to perform H/P; Megan Granda also will place wound consult for patient rash on right arm and surgical wound on left foot.

## 2018-06-06 NOTE — ED NOTES
Patient refusing [place] catheter. Patient educated on benefits of [place] and necessity. Will reinforce teaching again.

## 2018-06-06 NOTE — ED NOTES
TRANSFER - OUT REPORT:    Verbal report given to Graciela Henry (name) on Alexi Flower  being transferred to \[facility]\"") for routine progression of care       Report consisted of patients Situation, Background, Assessment and   Recommendations(SBAR). Information from the following report(s) SBAR, Kardex, ED Summary, [facility] and Recent Results was reviewed with the receiving nurse. Lines:       Opportunity for questions and clarification was provided.       Patient transported with:  EMS

## 2018-06-06 NOTE — BH NOTES
PRN Medication Documentation    Specific patient behavior that led to need for PRN medication: anxiety,pt request  Staff interventions attempted prior to PRN being given:coping skills  PRN medication given: ativan  Patient response/effectiveness of PRN medication: good,tl aware

## 2018-06-06 NOTE — BH NOTES
GROUP THERAPY PROGRESS NOTE    Zamzam Beaver did not participate in a 75 minute Process Group on the General Unit with a focus identifying feelings, planning for the day, and learning about using the [address] as a long-term personal treatment plan. The patient was still going through the admission process to the unit during this group.

## 2018-06-06 NOTE — H&P
INITIAL PSYCHIATRIC EVALUATION            IDENTIFICATION:    Patient Name  Kenia Wolff   Date of Birth 1965   [facility] [id number]   Medical Record Number  [id number]      Age  47 y.o. PCP Cody Allen MD   Admit date:  2018    Room Number  729/02  @ [facility]   Date of Service  2018            HISTORY         REASON FOR HOSPITALIZATION:  CC: \"Depressed\". Pt admitted on a voluntary basis for severe depression and passive suicidal thoughts    HISTORY OF PRESENT ILLNESS:    The patient, Kenia Wolff, is a 47 y.o. WHITE [place]  male with a past psychiatric history significant for alcohol dependence,opiate dependence, chronic pain s/p aneurysm and cva 10 years ago, recently diagnosed with bladder cancer and s/p tumor removal last week, who presents at this time with complaints of (and/or evidence of) the following emotional symptoms: depression and suicidal thoughts/threats since learning about his cancer. He has worried about having cancer his whole life because both of his parents  from cancer. He is worried about his future, particularly with limited support. Additional symptomatology include hopelessness, worry and severe pain . The above symptoms have been present for several weeks, but chronic intermittent depression. These symptoms are of moderate to high severity. These symptoms are constant in nature. He denies any substance abuse, but chart review reflects severe alcohol dependence in the past and opiate dependence currently methadone.        ALLERGIES:   Allergies   Allergen Reactions    Sulfamethoxazole-Trimethoprim Rash     L eye and L hand    Ciprofloxacin Hives     Per pcp records    Codeine Hives     Tolerates dilaudid, oxycodone    Lyrica [Pregabalin] Hives    Sulfa (Sulfonamide Antibiotics) Rash    Ultram [Tramadol] Hives      MEDICATIONS PRIOR TO ADMISSION:   Prescriptions Prior to Admission   Medication Sig    lisinopril (PRINIVIL, ZESTRIL) 20 mg tablet Take 20 mg by mouth daily. Indications: hypertension    ammonium lactate (AMLACTIN) 12 % topical cream Apply  to affected area two (2) times a day. rub in to affected area well   Indications: DRY SKIN    tamsulosin (FLOMAX) 0.4 mg capsule Take 0.4 mg by mouth daily. Indications: bladder cancer    triamcinolone acetonide (KENALOG) 0.1 % topical cream Apply  to affected area. use thin layer    aspirin delayed-release 81 mg tablet Take 81 mg by mouth daily.  METHADONE HCL (METHADONE PO) Take 105 mg by mouth daily. Patient takes liquid methadone.  colchicine (COLCRYS) 0.6 mg tablet Take 1 Tab by mouth daily as needed. For gout flare    allopurinol (ZYLOPRIM) 100 mg tablet Take 1 Tab by mouth daily.  levothyroxine (SYNTHROID) 75 mcg tablet Take 1 Tab by mouth Daily (before breakfast). PAST MEDICAL HISTORY:   Past Medical History:   Diagnosis Date    Aneurysm [facility])     (with stroke)    Bladder tumor     Family history of bladder cancer     Gout     Hypercholesterolemia     Hypertension     Lesion of bladder     Seizures [address] [address]     Stroke ([facility] [address] 2006    left sided weakness    Thromboembolus ([facility] [address]     Thyroid disease     TIA (transient ischemic attack) 2012     Past Surgical History:   Procedure Laterality Date    HX UROLOGICAL  04/20/2018    Cystoscopy, TURBT (greater than 5 cm resected) Dr. Ting Chavez, [facility].  KNEE ARTHROSCP HARV      left knee      SOCIAL HISTORY: Lives with a friend/ caretaker; Adult children live in the area; SSDI  Social History     Social History    Marital status:      Spouse name: N/A    Number of children: N/A    Years of education: N/A     Occupational History    Not on file.      Social History Main Topics    Smoking status: Current Every Day Smoker     Packs/day: 1.00    Smokeless tobacco: Never Used    Alcohol use 8.4 oz/week     14 Cans of beer per week    Drug use: Yes     Special: Prescription    Sexual activity: Yes     Other Topics Concern  Not on file     Social History Narrative      FAMILY HISTORY: History reviewed. No pertinent family history. Family History   Problem Relation Age of Onset    Diabetes Father     Diabetes Sister     Diabetes Brother     Cancer Paternal Grandfather      Bladder       REVIEW OF SYSTEMS:   Negative except chronic pain, limited mobility, depression  Pertinent items are noted in the History of Present Illness. All other Systems reviewed and are considered negative. MENTAL STATUS EXAM & VITALS     MENTAL STATUS EXAM (MSE):    MSE FINDINGS ARE WITHIN NORMAL LIMITS (WNL) UNLESS OTHERWISE STATED BELOW. ( ALL OF THE BELOW CATEGORIES OF THE MSE HAVE BEEN REVIEWED (reviewed 6/6/2018) AND UPDATED AS DEEMED APPROPRIATE )  General Presentation age appropriate and casually dressed, cooperative   Orientation oriented to time, place and person   Vital Signs  See below (reviewed 6/6/2018); Vital Signs (BP, Pulse, & Temp) are within normal limits if not listed below. Gait and Station Stable/steady, no ataxia   Musculoskeletal System No extrapyramidal symptoms (EPS); no abnormal muscular movements or Tardive Dyskinesia (TD); muscle strength and tone are within normal limits   Language No aphasia or dysarthria   Speech:  normal pitch and normal volume   Thought Processes logical; normal rate of thoughts; good abstract reasoning/computation   Thought Associations goal directed   Thought Content not internally preoccupied   Suicidal Ideations none   Homicidal Ideations none   Mood:  depressed and irritable   Affect:  depressed and irritable   Memory recent  good   Memory remote:  good   Concentration/Attention:  distractable   Fund of Knowledge wnl   Insight:  limited   Reliability poor   Judgment:  poor          VITALS:     No data found.     Wt Readings from Last 3 Encounters:   06/05/18 102.3 kg (225 lb 8.5 oz)   06/04/18 104.3 kg (230 lb)   03/13/18 100.4 kg (221 lb 5.5 oz)     Temp Readings from Last 3 Encounters:   06/06/18 97.8 °F (36.6 °C)   06/04/18 98.3 °F (36.8 °C)   03/26/18 98.1 °F (36.7 °C)     BP Readings from Last 3 Encounters:   06/06/18 121/67   06/05/18 (!) 173/99   03/26/18 130/84     Pulse Readings from Last 3 Encounters:   06/06/18 (!) 52   06/05/18 61   03/26/18 61            DATA     LABORATORY DATA:  Labs Reviewed   HCG URINE, QL   DRUG SCREEN, URINE     Admission on 06/05/2018, Discharged on 06/06/2018   Component Date Value Ref Range Status    AMPHETAMINES 06/06/2018 NEGATIVE   NEG   Final    BARBITURATES 06/06/2018 NEGATIVE   NEG   Final    BENZODIAZEPINES 06/06/2018 NEGATIVE   NEG   Final    COCAINE 06/06/2018 NEGATIVE   NEG   Final    METHADONE 06/06/2018 POSITIVE* NEG   Final    OPIATES 06/06/2018 NEGATIVE   NEG   Final    PCP(PHENCYCLIDINE) 06/06/2018 NEGATIVE   NEG   Final    THC (TH-CANNABINOL) 06/06/2018 NEGATIVE   NEG   Final    Drug screen comment 06/06/2018 (NOTE)   Final    Color 06/06/2018 YELLOW/STRAW    Final    Appearance 06/06/2018 CLEAR  CLEAR   Final    Specific gravity 06/06/2018 1.011  1.003 - 1.030   Final    pH (UA) 06/06/2018 6.0  5.0 - 8.0   Final    Protein 06/06/2018 NEGATIVE   NEG mg/dL Final    Glucose 06/06/2018 NEGATIVE   NEG mg/dL Final    Ketone 06/06/2018 NEGATIVE   NEG mg/dL Final    Bilirubin 06/06/2018 NEGATIVE   NEG   Final    Blood 06/06/2018 NEGATIVE   NEG   Final    Urobilinogen 06/06/2018 1.0  0.2 - 1.0 EU/dL Final    Nitrites 06/06/2018 NEGATIVE   NEG   Final    Leukocyte Esterase 06/06/2018 NEGATIVE   NEG   Final    WBC 06/06/2018 0-4  0 - 4 /hpf Final    RBC 06/06/2018 0-5  0 - 5 /hpf Final    Epithelial cells 06/06/2018 FEW  FEW /lpf Final    Bacteria 06/06/2018 NEGATIVE   NEG /hpf Final    UA:UC IF INDICATED 06/06/2018 CULTURE NOT INDICATED BY UA RESULT  CNI   Final    Hyaline cast 06/06/2018 0-2  0 - 5 /lpf Final   Admission on 06/05/2018, Discharged on 06/05/2018   Component Date Value Ref Range Status    Color 06/05/2018 YELLOW/STRAW    Final    Appearance 06/05/2018 CLEAR  CLEAR   Final    Specific gravity 06/05/2018 1.010  1.003 - 1.030   Final    pH (UA) 06/05/2018 6.0  5.0 - 8.0   Final    Protein 06/05/2018 NEGATIVE   NEG mg/dL Final    Glucose 06/05/2018 NEGATIVE   NEG mg/dL Final    Ketone 06/05/2018 NEGATIVE   NEG mg/dL Final    Bilirubin 06/05/2018 NEGATIVE   NEG   Final    Blood 06/05/2018 NEGATIVE   NEG   Final    Urobilinogen 06/05/2018 1.0  0.2 - 1.0 EU/dL Final    Nitrites 06/05/2018 NEGATIVE   NEG   Final    Leukocyte Esterase 06/05/2018 NEGATIVE   NEG   Final    ALCOHOL(ETHYL),SERUM 06/05/2018 <10  <10 MG/DL Final    Salicylate level 37/53/4981 4.5  2.8 - 20.0 MG/DL Final    Acetaminophen level 06/05/2018 <2* 10 - 30 ug/mL Final    WBC 06/05/2018 10.9  4.1 - 11.1 K/uL Final    RBC 06/05/2018 4.15  4.10 - 5.70 M/uL Final    HGB 06/05/2018 11.8* 12.1 - 17.0 g/dL Final    HCT 06/05/2018 35.6* 36.6 - 50.3 % Final    MCV 06/05/2018 85.8  80.0 - 99.0 FL Final    MCH 06/05/2018 28.4  26.0 - 34.0 PG Final    MCHC 06/05/2018 33.1  30.0 - 36.5 g/dL Final    RDW 06/05/2018 14.7* 11.5 - 14.5 % Final    PLATELET 20/51/8591 220  150 - 400 K/uL Final    MPV 06/05/2018 10.3  8.9 - 12.9 FL Final    NRBC 06/05/2018 0.0  0  WBC Final    ABSOLUTE NRBC 06/05/2018 0.00  0.00 - 0.01 K/uL Final    NEUTROPHILS 06/05/2018 60  32 - 75 % Final    LYMPHOCYTES 06/05/2018 29  12 - 49 % Final    MONOCYTES 06/05/2018 6  5 - 13 % Final    EOSINOPHILS 06/05/2018 4  0 - 7 % Final    BASOPHILS 06/05/2018 1  0 - 1 % Final    IMMATURE GRANULOCYTES 06/05/2018 1* 0.0 - 0.5 % Final    ABS. NEUTROPHILS 06/05/2018 6.6  1.8 - 8.0 K/UL Final    ABS. LYMPHOCYTES 06/05/2018 3.1  0.8 - 3.5 K/UL Final    ABS. MONOCYTES 06/05/2018 0.7  0.0 - 1.0 K/UL Final    ABS. EOSINOPHILS 06/05/2018 0.5* 0.0 - 0.4 K/UL Final    ABS. BASOPHILS 06/05/2018 0.1  0.0 - 0.1 K/UL Final    ABS. IMM.  GRANS. 06/05/2018 0.1* 0.00 - 0.04 K/UL Final    DF 06/05/2018 AUTOMATED    Final    Sodium 06/05/2018 140  136 - 145 mmol/L Final    Potassium 06/05/2018 3.9  3.5 - 5.1 mmol/L Final    Chloride 06/05/2018 107  97 - 108 mmol/L Final    CO2 06/05/2018 26  21 - 32 mmol/L Final    Anion gap 06/05/2018 7  5 - 15 mmol/L Final    Glucose 06/05/2018 95  65 - 100 mg/dL Final    BUN 06/05/2018 10  6 - 20 MG/DL Final    Creatinine 06/05/2018 0.69* 0.70 - 1.30 MG/DL Final    BUN/Creatinine ratio 06/05/2018 14  12 - 20   Final    GFR est AA 06/05/2018 >60  >60 ml/min/1.73m2 Final    GFR est non-AA 06/05/2018 >60  >60 ml/min/1.73m2 Final    Calcium 06/05/2018 8.8  8.5 - 10.1 MG/DL Final    Bilirubin, total 06/05/2018 0.4  0.2 - 1.0 MG/DL Final    ALT (SGPT) 06/05/2018 15  12 - 78 U/L Final    AST (SGOT) 06/05/2018 9* 15 - 37 U/L Final    Alk. phosphatase 06/05/2018 102  45 - 117 U/L Final    Protein, total 06/05/2018 6.8  6.4 - 8.2 g/dL Final    Albumin 06/05/2018 3.7  3.5 - 5.0 g/dL Final    Globulin 06/05/2018 3.1  2.0 - 4.0 g/dL Final    A-G Ratio 06/05/2018 1.2  1.1 - 2.2   Final    AMPHETAMINES 06/05/2018 NEGATIVE   NEG   Final    BARBITURATES 06/05/2018 NEGATIVE   NEG   Final    BENZODIAZEPINES 06/05/2018 NEGATIVE   NEG   Final    COCAINE 06/05/2018 NEGATIVE   NEG   Final    METHADONE 06/05/2018 POSITIVE* NEG   Final    OPIATES 06/05/2018 NEGATIVE   NEG   Final    PCP(PHENCYCLIDINE) 06/05/2018 NEGATIVE   NEG   Final    THC (TH-CANNABINOL) 06/05/2018 NEGATIVE   NEG   Final    Drug screen comment 06/05/2018 (NOTE)   Final        RADIOLOGY REPORTS:    Results from Hospital Encounter encounter on 03/13/18   XR FOOT LT MIN 3 V   Narrative EXAM:  XR FOOT LT MIN 3 V    INDICATION:   left foot pain and swelling; wound to foot x 1 year with poor  podiatry follow-up. .    COMPARISON:  None. FINDINGS:  Three views of the left foot. There is diffuse osteopenia.  There is  more focal osteopenia at the medial base of the first proximal phalanx. Impression IMPRESSION:  Focal osteopenia at the medial base of the first proximal phalanx. This can be seen with osteomyelitis. Xr Foot Lt Min 3 V    Result Date: 3/13/2018  EXAM:  XR FOOT LT MIN 3 V INDICATION:   left foot pain and swelling; wound to foot x 1 year with poor podiatry follow-up. . COMPARISON:  None. FINDINGS:  Three views of the left foot. There is diffuse osteopenia. There is more focal osteopenia at the medial base of the first proximal phalanx. IMPRESSION:  Focal osteopenia at the medial base of the first proximal phalanx. This can be seen with osteomyelitis. Mri Femur Carla Ceci Wo Cont    Result Date: 3/23/2018  EXAM:  MRI FEMUR LT W WO CONT INDICATION:  Skin changes in the left thigh may represent morphea or Lipodermatosclerosis. COMPARISON: No comparison MRI or x-ray. TECHNIQUE: Axial, coronal, and sagittal MRI of the left femur in the T1, T2, and inversion-recovery pulse sequences with and without fat saturation. Examination performed under general anesthesia. CONTRAST: Pre and post IV contrast 20 mL ProHance. FINDINGS: Bone marrow: within normal limits. No fracture, dislocation, or marrow replacing process. No evidence of stress reaction or periostitis. Joint fluid:  Small knee joint effusion. No enhancing synovitis. Tendons: Intact. No tenosynovitis. Muscles: Moderate diffuse atrophy. Subtle muscle edema is greatest in the biceps femoris, vastus lateralis, and basilar medialis muscles. No intramuscular or superficial fascial pathology. Neurovascular bundles: Within normal limits. Articular cartilage: Knee osteoarthritis is partially imaged. No evidence of septic arthritis. Soft tissue mass: No mass or drainable fluid collection. Left inguinal lymph nodes are prominent. Largest measures 1.9 x 1.9 cm. No evidence of subcutaneous septal thickening. IMPRESSION: 1. Subtle muscle edema is in the biceps femoris, vastus medialis, and vastus lateralis muscles.  If tissue diagnosis is desired, highest yield would be the distal vastus lateralis muscle. 2. No fascial pathology or synovitis on MRI. 3. Normal left femur bone. 4. Left inguinal lymphadenopathy should be followed resolution with physical exam.    Mri Foot Lt W Wo Cont    Result Date: 3/15/2018  INDICATION: Diabetic with left foot pain from injury one year ago. Drainage from wound. Exam: MRI left foot with an without contrast Sequences include short axis and long axis T1, short axis, long axis and sagittal T2 with fat saturation. Long axis TI with fat saturation. After the intravenous administration of 20 cc of ProHance multiplanar fat-suppressed T1-weighted images were obtained. Comparisons: March 13, 2018 and December 28, 2017 FINDINGS: There is lateral deviation of the toes. Examination of the marrow demonstrates no evidence of stress reaction, fracture or marrow replacing process. Specifically there are no reactive marrow changes at the base of the proximal phalanx of the great toe. There is no significant subcutaneous edema. There is no drainable abscess collection. On the T1-weighted images there is loss of signal in the dorsal soft tissues which may be due to fibrosis. There is fatty atrophy of the intrinsic muscles of the foot. No fluid extending along the tendon sheaths. IMPRESSION: 1. No evidence of drainable fluid collection or osteomyelitis     Duplex Lower Ext Venous Left    Result Date: 3/13/2018  LEFT LOWER EXTREMITY VENOUS DUPLEX ULTRASOUND INDICATION: Left leg swelling, pain, DVT suspected. Long-term left foot pain after injury, with recent increase in pain. History of DVT 5 years ago. Foot surgery one month ago. COMPARISON: None. PROCEDURE: Grayscale, color, and spectral Doppler ultrasound imaging of the left lower extremity veins was performed. FINDINGS: The left common femoral, superficial femoral, and popliteal veins demonstrate normal flow and response to augmentation.  Grayscale images are compromised by patient body habitus. The visualized calf veins are patent by color flow. IMPRESSION: Deep venous system patent by color flow.              MEDICATIONS       ALL MEDICATIONS  Current Facility-Administered Medications   Medication Dose Route Frequency    ziprasidone (GEODON) 20 mg in sterile water (preservative free) 1 mL injection  20 mg IntraMUSCular BID PRN    OLANZapine (ZyPREXA) tablet 5 mg  5 mg Oral Q6H PRN    benztropine (COGENTIN) tablet 2 mg  2 mg Oral BID PRN    benztropine (COGENTIN) injection 2 mg  2 mg IntraMUSCular BID PRN    LORazepam (ATIVAN) injection 2 mg  2 mg IntraMUSCular Q4H PRN    LORazepam (ATIVAN) tablet 1 mg  1 mg Oral Q4H PRN    zolpidem (AMBIEN) tablet 10 mg  10 mg Oral QHS PRN    acetaminophen (TYLENOL) tablet 650 mg  650 mg Oral Q4H PRN    ibuprofen (MOTRIN) tablet 400 mg  400 mg Oral Q8H PRN    magnesium hydroxide (MILK OF MAGNESIA) 400 mg/5 mL oral suspension 30 mL  30 mL Oral DAILY PRN    nicotine (NICODERM CQ) 21 mg/24 hr patch 1 Patch  1 Patch TransDERmal DAILY PRN    ammonium lactate (AMLACTIN) 12 % cream   Topical BID    lisinopril (PRINIVIL, ZESTRIL) tablet 20 mg  20 mg Oral DAILY    tamsulosin (FLOMAX) capsule 0.4 mg  0.4 mg Oral DAILY    triamcinolone acetonide (KENALOG) 0.1 % cream   Topical DAILY    aspirin delayed-release tablet 81 mg  81 mg Oral DAILY    allopurinol (ZYLOPRIM) tablet 100 mg  100 mg Oral DAILY    levothyroxine (SYNTHROID) tablet 75 mcg  75 mcg Oral ACB    [START ON 6/7/2018] escitalopram oxalate (LEXAPRO) tablet 5 mg  5 mg Oral DAILY      SCHEDULED MEDICATIONS  Current Facility-Administered Medications   Medication Dose Route Frequency    ammonium lactate (AMLACTIN) 12 % cream   Topical BID    lisinopril (PRINIVIL, ZESTRIL) tablet 20 mg  20 mg Oral DAILY    tamsulosin (FLOMAX) capsule 0.4 mg  0.4 mg Oral DAILY    triamcinolone acetonide (KENALOG) 0.1 % cream   Topical DAILY    aspirin delayed-release tablet 81 mg 81 mg Oral DAILY    allopurinol (ZYLOPRIM) tablet 100 mg  100 mg Oral DAILY    levothyroxine (SYNTHROID) tablet 75 mcg  75 mcg Oral ACB    [START ON 6/7/2018] escitalopram oxalate (LEXAPRO) tablet 5 mg  5 mg Oral DAILY                ASSESSMENT & PLAN        The patient, Aparna Camp, is a 47 y.o.  male who presents at this time for treatment of the following diagnoses:  Patient Active Hospital Problem List:     Major depression (6/6/2018)    Assessment: moderate to severe    Plan: Agree with inpatient hospitalization for further stabilization, safety monitoring and medication management  Medications- start lexapro 5mg daily  Provided supportive psychotherapy    Opiate Dependence  Assessment: Chronic  Plan: resume Methadone 105mg daily                 A coordinated, multidisplinary treatment team (includes the nurse, unit pharmacist,  and Jasmin Benson) round was conducted for this initial evaluation with the patient present. The following regarding medications was addressed during rounds with patient:   the risks and benefits of the proposed medication. The patient was given the opportunity to ask questions. Informed consent given to the use of the above medications. I will continue to adjust psychiatric and non-psychiatric medications (see above \"medication\" section and orders section for details) as deemed appropriate & based upon diagnoses and response to treatment. I have reviewed admission (and previous/old) labs and medical tests in the EHR and or transferring hospital documents. I will continue to order blood tests/labs and diagnostic tests as deemed appropriate and review results as they become available (see orders for details). I have reviewed old psychiatric and medical records available in the EHR. I Will order additional psychiatric records from other institutions to further elucidate the nature of patient's psychopathology and review once available.     I will gather additional collateral information from friends, family and o/p treatment team to further elucidate the nature of patient's psychopathology and baselline level of psychiatric functioning.       ESTIMATED LENGTH OF STAY:    3-5 days       STRENGTHS:  Access to housing/residential stability, Knowledge of medications and Awareness of Substance abuse issues                                        SIGNED:    Pavan Murdock MD  6/6/2018

## 2018-06-06 NOTE — ED NOTES
After reviewing notes from previous encounter, I called his caregiver, Latosha Kc. Message went immediately to voicemail. Patient states he does not have a key to his house . Charge nurse notified.

## 2018-06-06 NOTE — ED PROVIDER NOTES
EMERGENCY DEPARTMENT HISTORY AND PHYSICAL EXAM      Date: 6/5/2018  Patient Name: Sergio Rosa    History of Presenting Illness     Chief Complaint   Patient presents with    Urinary Retention     He was here earlier today and was discharged about 1230pm. According to another employee he has been sitting on the bench outside since at least 1430. The patient states that his caregiver left before he was discharged and never came back. He states he checked back in because he needs flomax that his caregiver was supposed to give him but since they did not come back he didn't get it. History Provided By: Patient    HPI: Sergio Rosa, 47 y.o. male with PMHx significant for hypertension, CVA, hypercholesterolemia, thromboembolus, bladder cancer, presents to the ED for evaluation of continued depression. Patient was seen at this ED about 12 hours ago for urinary retention, but during his visit he expressed depressive feelings as well as saying \"I don't want to be here any longer\". He expresses thoughts of increased stress related to recent medical problems. Currently he denies any plan for suicide, or homicidal ideations. Earlier today, he initially was planned to be admitted through [facility] for his depression, but his caregiver came and they were able to establish a safety contract. The patient was discharged, but has been waiting outside the hospital since his discharge (>12 hours) unable to contact his caregiver. Patient returns to the ED for continued feelings of depression. He is also complaining of a rash and still has urinary retention. He denies any other associated complaints. Chief Complaint: Depression   Timing: Constant   Location: Generalized   Modifying Factors: Made worse by medical condition   Associated Symptoms: denies any other associated signs or symptoms    PCP: Ruthy Foy MD    There are no other complaints, changes, or physical findings at this time.     Current Outpatient Prescriptions Medication Sig Dispense Refill    escitalopram oxalate (LEXAPRO) 5 mg tablet Take 1 Tab by mouth daily. Indications: Generalized Anxiety Disorder, major depressive disorder 15 Tab 1    lisinopril (PRINIVIL, ZESTRIL) 20 mg tablet Take 20 mg by mouth daily. Indications: hypertension      ammonium lactate (AMLACTIN) 12 % topical cream Apply  to affected area two (2) times a day. rub in to affected area well   Indications: DRY SKIN      tamsulosin (FLOMAX) 0.4 mg capsule Take 0.4 mg by mouth daily. Indications: bladder cancer      triamcinolone acetonide (KENALOG) 0.1 % topical cream Apply  to affected area. use thin layer      aspirin delayed-release 81 mg tablet Take 81 mg by mouth daily.  METHADONE HCL (METHADONE PO) Take 105 mg by mouth daily. Patient takes liquid methadone.  colchicine (COLCRYS) 0.6 mg tablet Take 1 Tab by mouth daily as needed. For gout flare 30 Tab 0    allopurinol (ZYLOPRIM) 100 mg tablet Take 1 Tab by mouth daily. 30 Tab 0    levothyroxine (SYNTHROID) 75 mcg tablet Take 1 Tab by mouth Daily (before breakfast). 30 Tab 0     Past History     Past Medical History:  Past Medical History:   Diagnosis Date    Aneurysm ([address]     (with stroke)    Bladder tumor     Family history of bladder cancer     Gout     Hypercholesterolemia     Hypertension     Lesion of bladder     Seizures ([address]     Stroke ([address] 2006    left sided weakness    Thromboembolus ([address]     Thyroid disease     TIA (transient ischemic attack) 2012       Past Surgical History:  Past Surgical History:   Procedure Laterality Date    HX UROLOGICAL  04/20/2018    Cystoscopy, TURBT (greater than 5 cm resected) [facility]     KNEE ARTHROSCP HARV      left knee       Family History:  Family History   Problem Relation Age of Onset   Wendy Imam Diabetes Father     Diabetes Sister     Diabetes Brother     Cancer Paternal Grandfather      Bladder       Social History:  Social History   Substance Use Topics    Smoking status: Current Every Day Smoker     Packs/day: 1.00    Smokeless tobacco: Never Used    Alcohol use 8.4 oz/week     14 Cans of beer per week       Allergies: Allergies   Allergen Reactions    Sulfamethoxazole-Trimethoprim Rash     L eye and L hand    Ciprofloxacin Hives     Per pcp records    Codeine Hives     Tolerates dilaudid, oxycodone    Lyrica [Pregabalin] Hives    Sulfa (Sulfonamide Antibiotics) Rash    Ultram [Tramadol] Hives     Review of Systems   Review of Systems   Constitutional: Negative for chills, diaphoresis and fever. HENT: Negative for rhinorrhea and sore throat. Eyes: Negative for pain. Respiratory: Negative for shortness of breath, wheezing and stridor. Cardiovascular: Negative for chest pain and leg swelling. Gastrointestinal: Negative for abdominal pain, diarrhea, nausea and vomiting. Genitourinary: Positive for difficulty urinating. Negative for dysuria, flank pain and hematuria. Musculoskeletal: Negative for back pain and neck stiffness. Skin: Positive for rash. Neurological: Negative for dizziness, seizures, syncope, weakness, light-headedness and headaches. Psychiatric/Behavioral: Negative for behavioral problems and confusion. Positive for depression     Physical Exam   Physical Exam   Constitutional: He is oriented to person, place, and time. He appears well-developed and well-nourished. No distress. HENT:   Head: Normocephalic and atraumatic. Right Ear: External ear normal.   Left Ear: External ear normal.   Nose: Nose normal.   Eyes: Conjunctivae and EOM are normal.   Neck: Normal range of motion. Neck supple. Cardiovascular: Normal rate, regular rhythm and normal heart sounds. No murmur heard. Pulmonary/Chest: Effort normal and breath sounds normal. He has no decreased breath sounds. He has no wheezes. Abdominal: Soft. Bowel sounds are normal. He exhibits no distension. There is tenderness in the suprapubic area.  There is no guarding. Musculoskeletal: Normal range of motion. He exhibits no edema or tenderness. Contractures of the left upper and lower extremities. L foot bandaged partial amputation. Neurological: He is alert and oriented to person, place, and time. Skin: Skin is warm and dry. He is not diaphoretic. Chronic venous skin changes on the left forearm. No erythema, increased warmth, streaking or discharge. Psychiatric: His behavior is normal. Judgment normal. He exhibits a depressed mood. Suicidal: \"I don't want to be here any longer\" He expresses no suicidal plans and no homicidal plans. Nursing note and vitals reviewed. Diagnostic Study Results     Labs -     No results found for this or any previous visit (from the past 12 hour(s)). Medical Decision Making   I am the first provider for this patient. I reviewed the vital signs, available nursing notes, past medical history, past surgical history, family history and social history. Vital Signs-Reviewed the patient's vital signs. No data found. Records Reviewed: Nursing Notes, Old Medical Records and Previous Laboratory Studies    Provider Notes (Medical Decision Making):   DDx: urinary retention, urethral injury, UTI, depression, anxiety, suicidal ideation    ED Course:   Initial assessment performed. The patients presenting problems have been discussed, and they are in agreement with the care plan formulated and outlined with them. I have encouraged them to ask questions as they arise throughout their visit. Progress Note:  12:15AM  Spoke with Axel Galarza [facility] [place], [place]), brief history provided, she will evaluate pt via tele-psych for possible admission. Written by Finesse Martin ED scribe, as dictated by Warren Maldonado PA-C.      CONSULT NOTE:   12:56 AM  Warren Maldonado PA-C spoke with Axel Galarza,   Specialty: [facility]SMART  Discussed pt's hx, disposition, and available diagnostic and imaging results. Reviewed care plans.  Consultant agrees with plans as outlined. Blanca Hua will see if [facility] has any available beds for the patient. SIGN OUT:  1:26 AM  Patient's presentation, labs/imaging and plan of care was reviewed with Trish Waldron MD as part of sign out. They will continue with care as part of the plan discussed with the patient. Trish Waldron MD's assistance in completion of this plan is greatly appreciated but it should be noted that I will be the provider of record for this patient. Joelle Castillo PA-C     Progress Note:  2:28 AM  Spoke with Blanca Hua [facility]) who has evaluated pt and discussed with Dr. Onofre Shepherd who will accept pt for admission to psychiatry department at [facility]. Written by MAMADOU Silva, as dictated by Trihs Waldron MD.    SIGN OUT:  6:57 AM  Patient's presentation, labs/imaging and plan of care was reviewed with Beto Choi M.D as part of sign out. They will monitor pt and make final assessment pending transport to [facility] as part of the plan discussed with the patient. Beto Choi M.D's assistance in completion of this plan is greatly appreciated but it should be noted that I will be the provider of record for this patient. Trish Waldron MD    Disposition:  Transfer Note:  Patient is being transferred to [facility], transfer accepted by Dr. Onofre Shepherd. The reasons for patient's transfer have been discussed with the patient and available family. They convey agreement and understanding for the need to be transferred as explained to them by Joelle Castillo PA-C. PLAN:  1. Admission to [facility]    Diagnosis     Clinical Impression:   1. Depression, unspecified depression type    2. Urinary retention        Attestations:     This note is prepared by Leticia Oleary, acting as Scribe for RITA Dillon PA-C: The scribe's documentation has been prepared under my direction and personally reviewed by me in its entirety. I confirm that the note above accurately reflects all work, treatment, procedures, and medical decision making performed by me. This note will not be viewable in [address]

## 2018-06-06 NOTE — ED NOTES
Patient asked to urinate. Patient agitated stating \"God damn, I am doing my best. Give me a break. \" Informed patient that this was the last aspect we were waiting on to move forward. Patient verbalizes understanding.

## 2018-06-06 NOTE — PROGRESS NOTES
Problem: Depressed Mood (Adult/Pediatric)  Goal: *STG: Remains safe in hospital  Outcome: Progressing Towards Goal  1530: Greeted patient on unit in room resting quietly. Appears in no acute distress. No voiced concerns at present. Will continue to monitor on Q 15 minute safety checks.

## 2018-06-07 PROCEDURE — 77030011255 HC DSG AQUACEL AG BMS -A

## 2018-06-07 PROCEDURE — 65220000003 HC RM SEMIPRIVATE PSYCH

## 2018-06-07 PROCEDURE — 74011250637 HC RX REV CODE- 250/637: Performed by: PSYCHIATRY & NEUROLOGY

## 2018-06-07 RX ORDER — LIDOCAINE HYDROCHLORIDE 20 MG/ML
JELLY TOPICAL AS NEEDED
Status: DISCONTINUED | OUTPATIENT
Start: 2018-06-07 | End: 2018-06-08 | Stop reason: HOSPADM

## 2018-06-07 RX ADMIN — ASPIRIN 81 MG: 81 TABLET, COATED ORAL at 09:43

## 2018-06-07 RX ADMIN — LISINOPRIL 20 MG: 20 TABLET ORAL at 09:44

## 2018-06-07 RX ADMIN — ESCITALOPRAM OXALATE 5 MG: 10 TABLET ORAL at 09:43

## 2018-06-07 RX ADMIN — ALLOPURINOL 100 MG: 100 TABLET ORAL at 09:44

## 2018-06-07 RX ADMIN — ZOLPIDEM TARTRATE 10 MG: 10 TABLET ORAL at 22:08

## 2018-06-07 RX ADMIN — METHADONE HYDROCHLORIDE 105 MG: 5 SOLUTION ORAL at 09:44

## 2018-06-07 RX ADMIN — TAMSULOSIN HYDROCHLORIDE 0.4 MG: 0.4 CAPSULE ORAL at 09:43

## 2018-06-07 RX ADMIN — IBUPROFEN 400 MG: 400 TABLET ORAL at 20:24

## 2018-06-07 RX ADMIN — TRIAMCINOLONE ACETONIDE: 1 CREAM TOPICAL at 09:47

## 2018-06-07 RX ADMIN — LEVOTHYROXINE SODIUM 75 MCG: 75 TABLET ORAL at 06:32

## 2018-06-07 NOTE — PROGRESS NOTES
Actively participated in Spirituality Group about letting go on [facility] [place] unit     [facility] [employer].  [facility]'s Staff  (César Sanchez Patient Care Specialist)   Paging Service [phone])

## 2018-06-07 NOTE — BH NOTES
GROUP THERAPY PROGRESS NOTE    Stacy Ling participated in a morning Process Group on the General Unit with a focus identifying feelings,   planning for the day, and learning more about DBT concepts on \"Emotion Regulation. \"    .  Group time: 70 minutes. Personal goal for participation: To increase the capacity to improve ones mood, set personal goals,   and understand more about basic activities to help regulate emotions. Goal orientation: The patients will be able to identify their feelings and develop a plan for   structuring their day. They were also presented with a summary sheet on emotional regulation,   in regards to focusing on one goal per day, taking physical care of oneself, and recognizing   and/or building positive experiences. The didactic portion of the session focused on these three  concepts; 1) defining and focusing on one goal per day; 2) taking care of ones physical   maintenance and basic needs - sleep, nutrition, and exercise; and 3) finding and building on   positive experiences. Group therapy participation: With prompting, this patient partially participated in the group. Therapeutic interventions reviewed and discussed: The group members were asked to   identify an emotion they are having and/or let the group know what they want to focus on for the   day as they continue to make discharge plans. The group members reviewed three DBT   suggestions regarding emotional regulation, in regards to focusing on one goal per day, taking   physical care of oneself, and recognizing and/or building positive experiences. It was suggested   that these three concepts can be seen as headings for their list of coping skills. The group   members were also provided worksheets on the topic discussed for their review and use on   their own time. Impression of participation: The patient spent most of his time in group quietly listening to his peers.    When prompted, he shared that he had recently been diagnosed with bladder cancer after having a   stroke from an aneurism about ten years ago. \"I've lost everything,\" he added, \". ..my home, my marriage,  my family, and my job. \" He sounded depressed and frightened. He was alert and generally oriented. He expressed no current SI/HI and displayed no overt psychotic symptoms in this group. He was  called out of group with about fifteen minutes left in the session to meet with his treatment team.  His affect was severely depressed and anxious. His mood matched his affect. He articulated much  that he finds empty and frightening for him but also mentioned he needed to firm up his appointment  with a new urologist at the [facility]; he wanted to continue to learn about his medical   condition and get it treated as fully as possible.

## 2018-06-07 NOTE — BH NOTES
GROUP THERAPY PROGRESS NOTE    Aparna Camp is participating in Reflection Group    Group time: 15 minutes    Personal goal for participation: My goal for today was to come here and get some help after finding out some not so good news today. Goal orientation: relaxation    Group therapy participation: active    Therapeutic interventions reviewed and discussed: Staff explained to the patient that he had done the right thing.  Rules of the unit were explained and also staff informed him that he picked the right decision in coming to the hospital.    Impression of participation: active

## 2018-06-07 NOTE — PROGRESS NOTES
[facility]  Master Treatment Plan for Marta Mccormack Treatment Plan Initiated: 6/7/18      Treatment Plan Modalities:  Type of Modality Amount  (x minutes) Frequency (x/week) Duration (x days) Name of Responsible Staff   Community & wrap-up meetings to encourage peer interactions 15 7 1   Tereso Dill psychotherapy to assist in building coping skills and internal controls 60 7 1 Selvin Vaz   Therapeutic activity groups to build coping skills 60 7 1 Selvin Vaz   Psychoeducation in group setting to address:   Medication education   15 7 1 Nasreen HARRELL Coping skills         Relaxation techniques         Symptom management         Discharge planning   60 2 [address]   Spirituality    60 2 1 polo BOB   60 1 1 volunteer   Recovery/AA/NA      volunteer   Physician medication management   15 7 1 Dr. Medhat Chung meeting/discharge planning   15 2 1 Shanthi Harrison and Khoa Rutherford                                      Problem: Depressed Mood (Adult/Pediatric) These goals will be met by 6/11/18. Goal: *STG: Participates in treatment plan  Outcome: Progressing Towards Goal  Reviewed medications. Pt is engaged, but appears sad and depressed. Thought process and content WDL. Pt will continue to be engaged and participate in daily activities. Staff will continue to support medication management, provide emotional support. Goal: *STG: Verbalizes anger, guilt, and other feelings in a constructive manor  Outcome: Progressing Towards Goal  Overall frustration due to medical complications. Problem: Falls - Risk of These goals will be met by 6/11/18. Goal: *Absence of Falls  Document Jordon Fall Risk and appropriate interventions in the flowsheet.    Outcome: Progressing Towards Goal  Fall Risk Interventions:  Mobility Interventions: Utilize walker, cane, or other assitive device         Medication Interventions: Teach patient to arise slowly, Assess postural VS orthostatic hypotension, Bed/chair exit alarm     1349: Independent with ambulation using surgical boot and cane. 1359: Called pts caregiver and attempted to leave voicemail, but it was full. Wanted to discuss follow-up appointments and possibility of d/c over the weekend.

## 2018-06-07 NOTE — BH NOTES
PSYCHIATRIC PROGRESS NOTE         Patient Name  Phyllis Matute   Date of Birth 1965   [facility] [id number]   Medical Record Number  [id number]      Age  47 y.o. PCP Sangeeta Hernandez MD   Admit date:  6/6/2018    [facility]  729/02  [facility]   Date of Service  6/7/2018          PSYCHOTHERAPY SESSION NOTE:  Length of psychotherapy session: 20 minutes    Main condition/diagnosis/issues treated during session today, 6/7/2018 : depression, difficulty coping with cancer    I employed Cognitive Behavioral therapy techniques, Reality-Oriented psychotherapy, as well as supportive psychotherapy in regards to various ongoing psychosocial stressors, including the following: pre-admission and current problems; housing issues; occupational issues; academic issues; legal issues; medical issues; and stress of hospitalization. Interpersonal relationship issues and psychodynamic conflicts explored. Attempts made to alleviate maladaptive patterns. We, also, worked on issues of denial & effects of substance dependency/use     Overall, patient is not progressing    Treatment Plan Update (reviewed an updated 6/7/2018) : I will modify psychotherapy tx plan by implementing more stress management strategies, building upon cognitive behavioral techniques, increasing coping skills, as well as shoring up psychological defenses). E & M PROGRESS NOTE:         HISTORY       CC:  \"Depression\"  HISTORY OF PRESENT ILLNESS/INTERVAL HISTORY:  (reviewed/updated 6/7/2018). per initial evaluation:   The patient, Phyllis Matute, is a 47 y.o.   WHITE [place]  male with a past psychiatric history significant for alcohol dependence,opiate dependence, chronic pain s/p aneurysm and cva 10 years ago, recently diagnosed with bladder cancer and s/p tumor removal last week, who presents at this time with complaints of (and/or evidence of) the following emotional symptoms: depression and suicidal thoughts/threats since learning about his cancer. He has worried about having cancer his whole life because both of his parents  from cancer. He is worried about his future, particularly with limited support. Additional symptomatology include hopelessness, worry and severe pain . The above symptoms have been present for several weeks, but chronic intermittent depression. These symptoms are of moderate to high severity. These symptoms are constant in nature. He denies any substance abuse, but chart review reflects severe alcohol dependence in the past and opiate dependence currently methadone. Charla Cole presents/reports/evidences the following emotional symptoms today, 2018:depression, suicidal thoughts/threats and anxiety. The above symptoms have been present for several days to weeks. These symptoms are of high severity. The symptoms are constant in nature. Additional symptomatology and features include difficulty coping with cancer diagnosis. SIDE EFFECTS: (reviewed/updated 2018)  None reported or admitted to. ALLERGIES:(reviewed/updated 2018)  Allergies   Allergen Reactions    Sulfamethoxazole-Trimethoprim Rash     L eye and L hand    Ciprofloxacin Hives     Per pcp records    Codeine Hives     Tolerates dilaudid, oxycodone    Lyrica [Pregabalin] Hives    Sulfa (Sulfonamide Antibiotics) Rash    Ultram [Tramadol] Hives      MEDICATIONS PRIOR TO ADMISSION:(reviewed/updated 2018)  Prescriptions Prior to Admission   Medication Sig    lisinopril (PRINIVIL, ZESTRIL) 20 mg tablet Take 20 mg by mouth daily. Indications: hypertension    ammonium lactate (AMLACTIN) 12 % topical cream Apply  to affected area two (2) times a day. rub in to affected area well   Indications: DRY SKIN    tamsulosin (FLOMAX) 0.4 mg capsule Take 0.4 mg by mouth daily. Indications: bladder cancer    triamcinolone acetonide (KENALOG) 0.1 % topical cream Apply  to affected area.  use thin layer    aspirin delayed-release 81 mg tablet Take 81 mg by mouth daily.  METHADONE HCL (METHADONE PO) Take 105 mg by mouth daily. Patient takes liquid methadone.  colchicine (COLCRYS) 0.6 mg tablet Take 1 Tab by mouth daily as needed. For gout flare    allopurinol (ZYLOPRIM) 100 mg tablet Take 1 Tab by mouth daily.  levothyroxine (SYNTHROID) 75 mcg tablet Take 1 Tab by mouth Daily (before breakfast). PAST MEDICAL HISTORY: Past medical history from the initial psychiatric evaluation has been reviewed (reviewed/updated 6/7/2018) with no additional updates (I asked patient and no additional past medical history provided). Past Medical History:   Diagnosis Date    Aneurysm [facility])     (with stroke)    Bladder tumor     Family history of bladder cancer     Gout     Hypercholesterolemia     Hypertension     Lesion of bladder     Seizures ([facility]     Stroke ([facility] 2006    left sided weakness    Thromboembolus ([facility]     Thyroid disease     TIA (transient ischemic attack) 2012     Past Surgical History:   Procedure Laterality Date    HX UROLOGICAL  04/20/2018    Cystoscopy, TURBT (greater than 5 cm resected) Dr. Christiane Leung, [facility].  KNEE ARTHROSCP HARV      left knee      SOCIAL HISTORY: Social history from the initial psychiatric evaluation has been reviewed (reviewed/updated 6/7/2018) with no additional updates (I asked patient and no additional social history provided). Social History     Social History    Marital status:      Spouse name: N/A    Number of children: N/A    Years of education: N/A     Occupational History    Not on file.      Social History Main Topics    Smoking status: Current Every Day Smoker     Packs/day: 1.00    Smokeless tobacco: Never Used    Alcohol use 8.4 oz/week     14 Cans of beer per week    Drug use: Yes     Special: Prescription    Sexual activity: Yes     Other Topics Concern    Not on file     Social History Narrative      FAMILY HISTORY: Family history from the initial psychiatric evaluation has been reviewed (reviewed/updated 6/7/2018) with no additional updates (I asked patient and no additional family history provided). Family History   Problem Relation Age of Onset    Diabetes Father     Diabetes Sister     Diabetes Brother     Cancer Paternal Grandfather      Bladder       REVIEW OF SYSTEMS: (reviewed/updated 6/7/2018)  Appetite:good   Sleep: good   All other Review of Systems: Negative          MENTAL Bridget [address] (MSE):    MSE FINDINGS ARE WITHIN NORMAL LIMITS (WNL) UNLESS OTHERWISE STATED BELOW. ( ALL OF THE BELOW CATEGORIES OF THE MSE HAVE BEEN REVIEWED (reviewed 6/7/2018) AND UPDATED AS DEEMED APPROPRIATE )  General Presentation age appropriate and casually dressed, cooperative   Orientation oriented to time, place and person   Vital Signs  See below (reviewed 6/7/2018); Vital Signs (BP, Pulse, & Temp) are within normal limits if not listed below.    Gait and Station Stable/steady, no ataxia   Musculoskeletal System No extrapyramidal symptoms (EPS); no abnormal muscular movements or Tardive Dyskinesia (TD); muscle strength and tone are within normal limits   Language No aphasia or dysarthria   Speech:  normal pitch and normal volume   Thought Processes logical; normal rate of thoughts; good abstract reasoning/computation   Thought Associations goal directed   Thought Content not internally preoccupied   Suicidal Ideations contracts for safety   Homicidal Ideations none   Mood:  depressed   Affect:  depressed   Memory recent  good   Memory remote:  good   Concentration/Attention:  wnl   Fund of Knowledge wnl   Insight:  good   Reliability good   Judgment:  good          VITALS:     Patient Vitals for the past 24 hrs:   Temp Pulse Resp BP SpO2   06/07/18 1225 97.9 °F (36.6 °C) 65 16 151/87 97 %   06/07/18 0745 98.2 °F (36.8 °C) 66 16 137/80 97 %   06/06/18 2024 98.9 °F (37.2 °C) (!) 56 16 130/80 98 %   06/06/18 1600 98.1 °F (36.7 °C) 70 18 120/76 95 % Wt Readings from Last 3 Encounters:   06/05/18 102.3 kg (225 lb 8.5 oz)   06/04/18 104.3 kg (230 lb)   03/13/18 100.4 kg (221 lb 5.5 oz)     Temp Readings from Last 3 Encounters:   06/07/18 97.9 °F (36.6 °C)   06/06/18 97.8 °F (36.6 °C)   06/04/18 98.3 °F (36.8 °C)     BP Readings from Last 3 Encounters:   06/07/18 151/87   06/06/18 121/67   06/05/18 (!) 173/99     Pulse Readings from Last 3 Encounters:   06/07/18 65   06/06/18 (!) 52   06/05/18 61            DATA     LABORATORY DATA:(reviewed/updated 6/7/2018)  No results found for this or any previous visit (from the past 24 hour(s)). Lab Results   Component Value Date/Time    Valproic acid 99 10/10/2016 04:23 AM     No results found for: LITH   RADIOLOGY REPORTS:(reviewed/updated 6/7/2018)  Xr Foot Lt Min 3 V    Result Date: 3/13/2018  EXAM:  XR FOOT LT MIN 3 V INDICATION:   left foot pain and swelling; wound to foot x 1 year with poor podiatry follow-up. . COMPARISON:  None. FINDINGS:  Three views of the left foot. There is diffuse osteopenia. There is more focal osteopenia at the medial base of the first proximal phalanx. IMPRESSION:  Focal osteopenia at the medial base of the first proximal phalanx. This can be seen with osteomyelitis. Mri Femur Eliecer Nelson Wo Cont    Result Date: 3/23/2018  EXAM:  MRI FEMUR LT W WO CONT INDICATION:  Skin changes in the left thigh may represent morphea or Lipodermatosclerosis. COMPARISON: No comparison MRI or x-ray. TECHNIQUE: Axial, coronal, and sagittal MRI of the left femur in the T1, T2, and inversion-recovery pulse sequences with and without fat saturation. Examination performed under general anesthesia. CONTRAST: Pre and post IV contrast 20 mL ProHance. FINDINGS: Bone marrow: within normal limits. No fracture, dislocation, or marrow replacing process. No evidence of stress reaction or periostitis. Joint fluid:  Small knee joint effusion. No enhancing synovitis. Tendons: Intact. No tenosynovitis. Muscles:  Moderate diffuse atrophy. Subtle muscle edema is greatest in the biceps femoris, vastus lateralis, and basilar medialis muscles. No intramuscular or superficial fascial pathology. Neurovascular bundles: Within normal limits. Articular cartilage: Knee osteoarthritis is partially imaged. No evidence of septic arthritis. Soft tissue mass: No mass or drainable fluid collection. Left inguinal lymph nodes are prominent. Largest measures 1.9 x 1.9 cm. No evidence of subcutaneous septal thickening. IMPRESSION: 1. Subtle muscle edema is in the biceps femoris, vastus medialis, and vastus lateralis muscles. If tissue diagnosis is desired, highest yield would be the distal vastus lateralis muscle. 2. No fascial pathology or synovitis on MRI. 3. Normal left femur bone. 4. Left inguinal lymphadenopathy should be followed resolution with physical exam.    Mri Foot Lt W Wo Cont    Result Date: 3/15/2018  INDICATION: Diabetic with left foot pain from injury one year ago. Drainage from wound. Exam: MRI left foot with an without contrast Sequences include short axis and long axis T1, short axis, long axis and sagittal T2 with fat saturation. Long axis TI with fat saturation. After the intravenous administration of 20 cc of ProHance multiplanar fat-suppressed T1-weighted images were obtained. Comparisons: March 13, 2018 and December 28, 2017 FINDINGS: There is lateral deviation of the toes. Examination of the marrow demonstrates no evidence of stress reaction, fracture or marrow replacing process. Specifically there are no reactive marrow changes at the base of the proximal phalanx of the great toe. There is no significant subcutaneous edema. There is no drainable abscess collection. On the T1-weighted images there is loss of signal in the dorsal soft tissues which may be due to fibrosis. There is fatty atrophy of the intrinsic muscles of the foot. No fluid extending along the tendon sheaths. IMPRESSION: 1.  No evidence of drainable fluid collection or osteomyelitis     Duplex Lower Ext Venous Left    Result Date: 3/13/2018  LEFT LOWER EXTREMITY VENOUS DUPLEX ULTRASOUND INDICATION: Left leg swelling, pain, DVT suspected. Long-term left foot pain after injury, with recent increase in pain. History of DVT 5 years ago. Foot surgery one month ago. COMPARISON: None. PROCEDURE: Grayscale, color, and spectral Doppler ultrasound imaging of the left lower extremity veins was performed. FINDINGS: The left common femoral, superficial femoral, and popliteal veins demonstrate normal flow and response to augmentation. Grayscale images are compromised by patient body habitus. The visualized calf veins are patent by color flow. IMPRESSION: Deep venous system patent by color flow.          MEDICATIONS     ALL MEDICATIONS:   Current Facility-Administered Medications   Medication Dose Route Frequency    ziprasidone (GEODON) 20 mg in sterile water (preservative free) 1 mL injection  20 mg IntraMUSCular BID PRN    OLANZapine (ZyPREXA) tablet 5 mg  5 mg Oral Q6H PRN    benztropine (COGENTIN) tablet 2 mg  2 mg Oral BID PRN    benztropine (COGENTIN) injection 2 mg  2 mg IntraMUSCular BID PRN    LORazepam (ATIVAN) injection 2 mg  2 mg IntraMUSCular Q4H PRN    LORazepam (ATIVAN) tablet 1 mg  1 mg Oral Q4H PRN    zolpidem (AMBIEN) tablet 10 mg  10 mg Oral QHS PRN    acetaminophen (TYLENOL) tablet 650 mg  650 mg Oral Q4H PRN    ibuprofen (MOTRIN) tablet 400 mg  400 mg Oral Q8H PRN    magnesium hydroxide (MILK OF MAGNESIA) 400 mg/5 mL oral suspension 30 mL  30 mL Oral DAILY PRN    nicotine (NICODERM CQ) 21 mg/24 hr patch 1 Patch  1 Patch TransDERmal DAILY PRN    lisinopril (PRINIVIL, ZESTRIL) tablet 20 mg  20 mg Oral DAILY    tamsulosin (FLOMAX) capsule 0.4 mg  0.4 mg Oral DAILY    triamcinolone acetonide (KENALOG) 0.1 % cream   Topical DAILY    aspirin delayed-release tablet 81 mg  81 mg Oral DAILY    allopurinol (ZYLOPRIM) tablet 100 mg  100 mg Oral DAILY    levothyroxine (SYNTHROID) tablet 75 mcg  75 mcg Oral ACB    escitalopram oxalate (LEXAPRO) tablet 5 mg  5 mg Oral DAILY    methadone (DOLOPHINE) 5 mg/5 mL oral solution 105 mg  105 mg Oral DAILY    nicotine (NICORETTE) gum 2 mg  2 mg Oral Q2H PRN      SCHEDULED MEDICATIONS:   Current Facility-Administered Medications   Medication Dose Route Frequency    lisinopril (PRINIVIL, ZESTRIL) tablet 20 mg  20 mg Oral DAILY    tamsulosin (FLOMAX) capsule 0.4 mg  0.4 mg Oral DAILY    triamcinolone acetonide (KENALOG) 0.1 % cream   Topical DAILY    aspirin delayed-release tablet 81 mg  81 mg Oral DAILY    allopurinol (ZYLOPRIM) tablet 100 mg  100 mg Oral DAILY    levothyroxine (SYNTHROID) tablet 75 mcg  75 mcg Oral ACB    escitalopram oxalate (LEXAPRO) tablet 5 mg  5 mg Oral DAILY    methadone (DOLOPHINE) 5 mg/5 mL oral solution 105 mg  105 mg Oral DAILY          ASSESSMENT & PLAN     DIAGNOSES REQUIRING ACTIVE TREATMENT AND MONITORING: (reviewed/updated 6/7/2018)  Patient Active Hospital Problem List:   Major depression (6/6/2018)    Assessment: chronic, severe, complicated by medical illness, noncompliance and limited support    Plan: Agree with inpatient hospitalization for further stabilization, safety monitoring and medication management  Medications- continue lexapro 5mg daily  Provided supportive psychotherapy         Uncomplicated [facility] (6/6/2018)    Assessment: chronic    Plan: methadone 105mg daily          In summary, Rory Johnson, is a 47 y.o.  male who presents with a severe exacerbation of the principal diagnosis of Major depression  Patient's condition is improving. Patient requires continued inpatient hospitalization for further stabilization, safety monitoring and medication management.   I will continue to coordinate the provision of individual, milieu, occupational, group, and substance abuse therapies to address target symptoms/diagnoses as deemed appropriate for the individual patient. A coordinated, multidisplinary treatment team round was conducted with the patient (this team consists of the nurse, psychiatric unit pharmcist,  and writer). Complete current electronic health record for patient has been reviewed today including consultant notes, ancillary staff notes, nurses and psychiatric tech notes. Suicide risk assessment completed and patient deemed to be of low risk for suicide at this time. The following regarding medications was addressed during rounds with patient:   the risks and benefits of the proposed medication. The patient was given the opportunity to ask questions. Informed consent given to the use of the above medications. Will continue to adjust psychiatric and non-psychiatric medications (see above \"medication\" section and orders section for details) as deemed appropriate & based upon diagnoses and response to treatment. I will continue to order blood tests/labs and diagnostic tests as deemed appropriate and review results as they become available (see orders for details and above listed lab/test results). I will order psychiatric records from previous [facility] hospitals to further elucidate the nature of patient's psychopathology and review once available. I will gather additional collateral information from friends, family and o/p treatment team to further elucidate the nature of patient's psychopathology and baselline level of psychiatric functioning. I certify that this patient's inpatient psychiatric hospital services furnished since the previous certification were, and continue to be, required for treatment that could reasonably be expected to improve the patient's condition, or for diagnostic study, and that the patient continues to need, on a daily basis, active treatment furnished directly by or requiring the supervision of inpatient psychiatric facility personnel.  In addition, the hospital records show that services furnished were intensive treatment services, admission or related services, or equivalent services.     EXPECTED DISCHARGE DATE/DAY: TBD     DISPOSITION: Home       Signed By:   Xenia Valladares MD  6/7/2018

## 2018-06-07 NOTE — PROGRESS NOTES
Spiritual Care Assessment/Progress Note  [facility]      NAME: Marta Yousif      MRN: [id number]  AGE: 47 y.o.  SEX: male  [employer]ian Affiliation: Denominational   Language: English     6/7/2018     Total Time (in minutes): 15     Spiritual Assessment begun in [address] [place] [address] through conversation with:         [x]Patient        [] Family    [] Friend(s)        Reason for Consult: Request by patient     Spiritual beliefs: (Please include comment if needed)     [] Identifies with a kendy tradition:     [] Supported by a kendy community:      [] Claims no spiritual orientation:      [] Seeking spiritual identity:           [x] Adheres to an individual form of spirituality:      [] Not able to assess:                     Identified resources for coping:      [x] Prayer                               [] Music                  [] Guided Imagery     [] Family/friends                 [] Pet visits     [] Devotional reading                         [] Unknown     [] Other:                                            Interventions offered during this visit: (See comments for more details)    Patient Interventions: Affirmation of emotions/emotional suffering, Catharsis/review of pertinent events in supportive environment, Iconic (affirming the presence of God/Higher Power), Normalization of emotional/spiritual concerns, Prayer (assurance of)           Plan of Care:     [] Support spiritual and/or cultural needs    [] Support AMD and/or advance care planning process      [] Support grieving process   [] Coordinate Rites and/or Rituals    [] Coordination with community clergy   [] No spiritual needs identified at this time   [] Detailed Plan of Care below (See Comments)  [] Make referral to Music Therapy  [] Make referral to Pet Therapy     [] Make referral to Addiction services  [] Make referral to [facility]  [] Make referral to Spiritual Care Partner  [] No future visits requested        [x] Follow up visits as needed Comments: Responded to request via [facility] for spiritual care consult by Dr. Radha Martell. Met with Edgardo Olson in the community room, where he provided an overview of his unfortunate former marriage and disabling health issues. Was transparent in discussing his use of alcohol and prescription drugs to cope with pain---emotional and physical.  Clearly is worried about being diagnosed with cancer a year ago and the possibility that this will be a fatal disease. Indicated some regret over his life choices. Voiced an awareness of the unacceptability of suicide as a measure to avoid suffering. Unable to complete visit due to clinical interest in providing wound care. Will request re-visit tomorrow. [facility]'s Staff  (César Sanchez Patient Care Specialist)   Paging Service [address] [place] [phone])

## 2018-06-07 NOTE — BH NOTES
GROUP THERAPY PROGRESS NOTE    Ulysses Knows is participating in [place]. Group time: 30 minutes    Personal goal for participation: daily progress    Goal orientation: community    Group therapy participation: active    Therapeutic interventions reviewed and discussed:     Impression of participation: The patient is commended for his input.

## 2018-06-07 NOTE — PROGRESS NOTES
Physical Therapy Screening:  Services are not indicated at this time. An InBasket screening referral was triggered for physical therapy based on results obtained during the nursing admission assessment. The patients chart was reviewed and the patient is not appropriate for a skilled therapy evaluation at this time. Please consult physical therapy if any therapy needs arise. Thank you.     Jordon Montana, PT

## 2018-06-07 NOTE — PROGRESS NOTES
Problem: Depressed Mood (Adult/Pediatric)  Goal: *STG: Participates in treatment plan  Outcome: Progressing Towards Goal  Pt's mood is appropriate to situation. He participates in all therapeutic activities.

## 2018-06-07 NOTE — PROGRESS NOTES
Spoke with RN. Patient voiding without difficulty. Patient seen by wound care RN and recs placed for chronic left foot wound while patient is here. Per nurse no other medical concerns at this point. Will sign off. Please re-consult if hospitalist group needed.      [facility]   Hospitalist NP

## 2018-06-07 NOTE — WOUND CARE
WOCN Note:     New consult placed by Dar Munson NP, for wound care    Chart shows:  Admitted for depression; history of CVA and bladder cancer. Assessment:   Patient is A&O x 4, continent (uses urinal) and mobile - walked from day room to bedroom for assessment. Patient reports neuropathic pain from stroke as well a foot pain with wound care. Left heel intact with dry skin and no erythema. 1. POA, left dorsal foot at base of second toe = 2.5 x 1.5 x 0.2 cm  100% moist red with yellow glaze consistent with biofilm. no exudate. Periwound intact with generalized dyschromia. Margins are flat. Too sensitive to clean at this time. He reports he uses vinegar and baking soda to clean this at home and is requesting the same. Discussed our cleaning options with him and agreed to use saline while here. Currently had Xeroform gauze and dry dressing in place. Would prefer to use Iodosorb for this wound but he reports trying that and does not want to use it again. Negotiated using Aquacel AG while here. Applied Aquacel AG with dry topper. He is also requesting Lidocaine jelly to clean wound going forward. Redness in annular pattern to left arm with Kenalog cream in use and he reports this seems to be improving. Recommendations:    Left foot: remove dressing, apply thin layer of 2% lidocaine jelly and let it rest for a couple of minutes, clean with saline, apply Aquacel AG with dry cover. Discussed above plan with patient & RN, Logan Thurman.     Transition of Care: Plan to follow weekly and as needed while admitted to hospital.    AIXA Slaughter, RN, [facility]  Certified Wound, Ostomy, Continence Nurse  office [phone]  pager [phone] or call  to page

## 2018-06-07 NOTE — BH NOTES
There were only five minutes remaining in the group session when the patient arrived to the meeting.

## 2018-06-07 NOTE — INTERDISCIPLINARY ROUNDS
Behavioral Health Interdisciplinary Rounds     Patient Name: Wanda Lombard  Age: 47 y.o. Room/Bed:  729/02  Primary Diagnosis: Major depression   Admission Status: Voluntary     Readmission within 30 days: no  Power of  in place:   Patient requires a blocked bed: no          Reason for blocked bed: n/a    VTE Prophylaxis: Yes - Aspirin    Mobility needs/Fall risk: yes    Nutritional Plan: no  Consults:          Labs/Testing due today?: yes - Pt Refused    Sleep hours: 7.25       Participation in Care/Groups:  yes  Medication Compliant?: Yes  PRNS (last 24 hours): Sleep Aid    Restraints (last 24 hours):  no     CIWA (range last 24 hours):     COWS (range last 24 hours):      Alcohol screening (AUDIT) completed -   AUDIT Score: 0     If applicable, date SBIRT discussed in treatment team AND documented:     Tobacco - patient is a smoker: Have You Used Tobacco in the Past 30 Days: Yes  Illegal Drugs use: Have You Used Any Illegal Substances Over the Past 12 Months: No    24 hour chart check complete: no - Admitted < 24 hours ago    Patient goal(s) for today:   Treatment team focus/goals: Finish psychosocial  Progress note:  Pt states his caregiver called yesterday and told him to [employer] and never came back to the phone.   Pt states he hasn't been able to get back in touch with her     LOS:  1  Expected LOS: TBD    Financial concerns/prescription coverage:  [employer]  Date of last family contact: None     Family requesting physician contact today:  No  Discharge plan: Return home  Guns in the home:  No       Outpatient provider(s): Dr. Ham Cevallos    Participating treatment team members: Wanda Lombard, JUVENCIO Dinh; Dr. Ce Naranjo MD; Liberty Bazzi RN; Nurse extern

## 2018-06-07 NOTE — PROGRESS NOTES
Admission Medication Reconciliation:    Information obtained from:  [employer] data), [facility] , and patient interview. Attempted to contact [employer] x2 - on hold for >10 minutes. Comments/Recommendations: Updated PTA meds/reviewed patient's allergies. 1)  Added:     - tamsulosin 0.4mg daily     - ammonium lactate 12% cream     - triamcinolone 0.1% cream    2)  Changed:     - lisinopril 20mg daily (from 10mg)     - methadone 105mg daily (from 95mg- dose confirmed by MD/PharmD in ED, last dosed in ED on 6/5 AM)    3)  Removed:     - pregabalin (last filled 2/27 for 30 ds)     - lidocaine/prilocaine cream    4)  The [place] Prescription Monitoring Program () was assessed to determine fill history of any controlled medications. The only controlled medication filled is pregabalin (2/27/18). 5)  Previous psych admissions/discharge medications:    - 2/10/09-2/20/09 [facility]): temazepam 30mg QHS    - 3/15/09-3/24/09 [facility]): duloxetine 60mg daily    - 11/23/09-11/25/09 [facility]): duloxetine 60mg QAM     Significant PMH/Disease States:   Past Medical History:   Diagnosis Date    Aneurysm ([facility]     (with stroke)    Bladder tumor     Family history of bladder cancer     Gout     Hypercholesterolemia     Hypertension     Lesion of bladder     Seizures ([facility]     Stroke ([facility] 2006    left sided weakness    Thromboembolus ([facility]     Thyroid disease     TIA (transient ischemic attack) 2012     Chief Complaint for this Admission:  No chief complaint on file. Allergies:  Sulfamethoxazole-trimethoprim; Ciprofloxacin; Codeine; Lyrica [pregabalin]; Sulfa (sulfonamide antibiotics); and Ultram [tramadol]    Prior to Admission Medications:   Prior to Admission Medications   Prescriptions Last Dose Informant Patient Reported? Taking? METHADONE HCL (METHADONE PO)  Self Yes No   Sig: Take 105 mg by mouth daily. Patient takes liquid methadone.     allopurinol (ZYLOPRIM) 100 mg tablet  Self No No   Sig: Take 1 Tab by mouth daily. aspirin delayed-release 81 mg tablet  Self Yes No   Sig: Take 81 mg by mouth daily. colchicine (COLCRYS) 0.6 mg tablet  Self No No   Sig: Take 1 Tab by mouth daily as needed. For gout flare   levothyroxine (SYNTHROID) 75 mcg tablet  Self No No   Sig: Take 1 Tab by mouth Daily (before breakfast). lisinopril (PRINIVIL, ZESTRIL) 20 mg tablet   Yes Yes   Sig: Take 20 mg by mouth daily.  Indications: hypertension      Facility-Administered Medications: None     Shiloh Cao, PharmD, BCPP, [facility] Specialist, [address] [place] [address]

## 2018-06-07 NOTE — PROGRESS NOTES
Problem: Falls - Risk of  Goal: *Absence of Falls  Document Jordon Fall Risk and appropriate interventions in the flowsheet. Outcome: Progressing Towards Goal  Fall Risk Interventions:  Mobility Interventions: Patient to call before getting OOB       Medication Interventions: Patient to call before getting OOB  Pt in bed asleep. NAD. No falls noted/reported. Q 15 minute checks for safety maintained.

## 2018-06-08 VITALS
HEART RATE: 55 BPM | SYSTOLIC BLOOD PRESSURE: 137 MMHG | DIASTOLIC BLOOD PRESSURE: 89 MMHG | OXYGEN SATURATION: 97 % | TEMPERATURE: 98.5 F | RESPIRATION RATE: 16 BRPM

## 2018-06-08 PROCEDURE — 74011250637 HC RX REV CODE- 250/637: Performed by: PSYCHIATRY & NEUROLOGY

## 2018-06-08 PROCEDURE — GZHZZZZ GROUP PSYCHOTHERAPY: ICD-10-PCS | Performed by: PSYCHIATRY & NEUROLOGY

## 2018-06-08 RX ORDER — ESCITALOPRAM OXALATE 5 MG/1
5 TABLET ORAL DAILY
Qty: 15 TAB | Refills: 1 | Status: ON HOLD | OUTPATIENT
Start: 2018-06-09 | End: 2018-08-01

## 2018-06-08 RX ADMIN — TRIAMCINOLONE ACETONIDE: 1 CREAM TOPICAL at 08:11

## 2018-06-08 RX ADMIN — METHADONE HYDROCHLORIDE 105 MG: 5 SOLUTION ORAL at 10:10

## 2018-06-08 RX ADMIN — LISINOPRIL 20 MG: 20 TABLET ORAL at 08:10

## 2018-06-08 RX ADMIN — ESCITALOPRAM OXALATE 5 MG: 10 TABLET ORAL at 08:10

## 2018-06-08 RX ADMIN — ALLOPURINOL 100 MG: 100 TABLET ORAL at 08:10

## 2018-06-08 RX ADMIN — TAMSULOSIN HYDROCHLORIDE 0.4 MG: 0.4 CAPSULE ORAL at 08:10

## 2018-06-08 RX ADMIN — LEVOTHYROXINE SODIUM 75 MCG: 75 TABLET ORAL at 06:31

## 2018-06-08 RX ADMIN — ASPIRIN 81 MG: 81 TABLET, COATED ORAL at 08:10

## 2018-06-08 NOTE — PROGRESS NOTES
Problem: Falls - Risk of  Goal: *Absence of Falls  Document Jordon Fall Risk and appropriate interventions in the flowsheet. Outcome: Progressing Towards Goal  Fall Risk Interventions:  Mobility Interventions: Utilize walker, cane, or other assitive device         Medication Interventions: Teach patient to arise slowly, Assess postural VS orthostatic hypotension, Bed/chair exit alarm     Pt in bed asleep. NAD. No falls noted/reported. Q 15 minute checks for safety maintained.

## 2018-06-08 NOTE — PROGRESS NOTES
Visited Mr Yaneli Gunn in [place] [address] for follow-up visit per request of [address] [place] [address]. Mr Yaneli Gunn was watching TV with some of the other patients when  arrived; his facial affect was flat and he spoke in a soft, low voice. Provided active listening as patient shared about the health and emotional challenges he had been experiencing. Mr Yaneli Gunn admitted to feeling depressed and feeling useless because he was unable to work. When asked about his coping mechanisms and supportive relationships, Mr Yaneli Gunn stated that he had nobody. He shared that he had two daughters but indicated limited contact with them. He said that he was a member of [employer] and was a [employer]- [employer]. He did appreciate having  pray on his behalf; had prayer with patient and assured him of continued prayers for him. Encouraged patient to once again become active in his kendy community as a source of support after daren discharged from hospital. Led him to explore various activities that might contribute to feelings of usefulness and increased self-worth. Assured Mr Yaneli Gunn of ongoing  availability for support. : Rev. Yasir Phelan.  Barb Pennington; [facility], to contact [phone] [address] call: [phone]

## 2018-06-08 NOTE — INTERDISCIPLINARY ROUNDS
Behavioral Health Interdisciplinary Rounds     Patient Name: Stacy Ling  Age: 47 y.o. Room/Bed:  729/02  Primary Diagnosis: Major depression   Admission Status: Voluntary     Readmission within 30 days: no  Power of  in place: no  Patient requires a blocked bed: no          Reason for blocked bed: n/a    VTE Prophylaxis: Yes  - Aspirin  Mobility needs/Fall risk: yes    Nutritional Plan: no  Consults:          Labs/Testing due today?: Yes - (From 6-7-18) -Pt continues to refuse lab draw.     Sleep hours: 3.75       Participation in Care/Groups:  yes  Medication Compliant?: Yes  PRNS (last 24 hours): Sleep Aid and Pain    Restraints (last 24 hours):  no     CIWA (range last 24 hours):     COWS (range last 24 hours):      Alcohol screening (AUDIT) completed -   AUDIT Score: 0     If applicable, date SBIRT discussed in treatment team AND documented:     Tobacco - patient is a smoker: Have You Used Tobacco in the Past 30 Days: Yes  Illegal Drugs use: Have You Used Any Illegal Substances Over the Past 12 Months: No    24 hour chart check complete: yes     Patient goal(s) for today:   Treatment team focus/goals:   Progress note     LOS:  2  Expected LOS:     Financial concerns/prescription coverage:    Date of last family contact:       Family requesting physician contact today:    Discharge plan:   Guns in the home:         Outpatient provider(s):     Participating treatment team members: Stacy Ling, * (assigned SW),

## 2018-06-08 NOTE — PROGRESS NOTES
Problem: Depressed Mood (Adult/Pediatric)  Goal: *STG: Participates in treatment plan  Outcome: Progressing Towards Goal  Up on unit passively engaged and attending groups. Demonstrates appropriate behaviors and ability to follow staff direction and care for self. Denies SI no self harming behaviors. When discussing obtaining house keys from caregiver pt deflected from conversation to states \"I'm not comfortable living alone anyway\". Declined to further elaborate or share his thoughts/feelings with staff. Deflected focus to rules regarding his methadone and unit routine complaints. Daily goal is to have methadone time changed to 0730. Staff focus is on offering support, medication education, limit setting and obtaining house keys    0900: spoke with Tala Herrera, friend and care giver, she states pt is homeless x 10 yrs. 2017 he has been living between hospitals and hotel rooms. She works with local community resources to find temporary housing. States she did not feel comfortable sharing such resources as in past pts behaviors has lead to broken relationships. Also requested we do not bita his oncologist/urologist about his upcoming surgery on 6/18 at [facility] due to same reason being pts behaviors and current homelessness has deterred his receiving services from them. Reports she does not think pt is suicidal just \"malingering\" and \"acting this way\" so he can stay inpatient until his 6/18 surgery date. Describes pt spending his day after morning methadone clinic as riding in her car when she works as an [employer]  so he has a place to stay. Reports he will have money on 25th of every month but that the pt has difficulty managing his money and \"it only lasts about 3 days\". Encouraged her support of pt and informed her that tx team will be updated.

## 2018-06-08 NOTE — PROGRESS NOTES
2024  Ibuprofen, 400mg given for c/o left leg pain. 2120  Pt denies further. c/o pain.     2208  Pt given ambien 10mg given per pt request to promote rest.

## 2018-06-08 NOTE — BH NOTES
GROUP THERAPY PROGRESS NOTE    Herlinda Esteves participated in a Process Group with a focus identifying feelings, planning for the rest of the day, and reviewing DBT skills for managing emotional distress. Group time: 75 minutes. Personal goal for participation: To increase the capacity to improve ones mood, structure, and coping capacity. Goal orientation: The patient will be able to identify their feelings, develop a plan for structuring their day, and begin to appreciate the possibility of successfully managing distress and other forms of intense emotions. Group therapy participation: With prompting, this patient partially participated in the group. Therapeutic interventions reviewed and discussed: The group members were asked to introduce themselves to each other and to see if they could identify an emotion they are having and/or let the group know what they want to focus on for the day as they continue to make discharge plans. The group members also reviewed five suggested areas of DBT options when experiencing intense emotions: distraction, improve the moment, self-soothe, pros and cons, and radical acceptance. They were asked to take turns reading from the handout and discussing its contents. At the end of group the patients were provided a summary of the topics covered. Impression of participation: The patient joined the group within the last fifteen minutes. He said he was feeling \"rather mixed up. Larraine Piles Larraine Piles I have pain down my left side and in my bladder, where my cancer is. ..and they won't let me have heroin for my pain and are prescribing methadone instead. \" He was alert and generally oriented. He expressed no current SI/HI and displayed no overt psychotic symptoms. His affect was depressed, anxious, and angry. His mood matched his affect. He did not share if he had rescheduled an appointment at [facility] onc[facility] for his bladder cancer.

## 2018-06-08 NOTE — BH NOTES
Behavioral Health Transition Record to Provider    Patient Name: Maxim Suh  YOB: 1965  Medical Record Number: [id number]  Date of Admission: 6/6/2018  Date of Discharge: 6/8/2018    Attending Provider: Xenia Valladares MD  Discharging Provider: Xenia Valladares MD  To contact this individual call [phone] and ask the  to page. If unavailable, ask to be transferred to [facility] Provider on call. [facility] Provider will be available on call 24/7 and during holidays.     Primary Care Provider: Louise Ramirez MD    Allergies   Allergen Reactions    Sulfamethoxazole-Trimethoprim Rash     L eye and L hand    Ciprofloxacin Hives     Per pcp records    Codeine Hives     Tolerates dilaudid, oxycodone    Lyrica [Pregabalin] Hives    Sulfa (Sulfonamide Antibiotics) Rash    Ultram [Tramadol] Hives       Reason for Admission: Pt admitted on a voluntary basis for severe depression and passive suicidal thoughts     Admission Diagnosis: depression  Major depression  Depression    * No surgery found *    Results for orders placed or performed during the hospital encounter of 06/05/18   DRUG SCREEN, URINE   Result Value Ref Range    AMPHETAMINES NEGATIVE  NEG      BARBITURATES NEGATIVE  NEG      BENZODIAZEPINES NEGATIVE  NEG      COCAINE NEGATIVE  NEG      METHADONE POSITIVE (A) NEG      OPIATES NEGATIVE  NEG      PCP(PHENCYCLIDINE) NEGATIVE  NEG      THC (TH-CANNABINOL) NEGATIVE  NEG      Drug screen comment (NOTE)    URINALYSIS W/ REFLEX CULTURE   Result Value Ref Range    Color YELLOW/STRAW      Appearance CLEAR CLEAR      Specific gravity 1.011 1.003 - 1.030      pH (UA) 6.0 5.0 - 8.0      Protein NEGATIVE  NEG mg/dL    Glucose NEGATIVE  NEG mg/dL    Ketone NEGATIVE  NEG mg/dL    Bilirubin NEGATIVE  NEG      Blood NEGATIVE  NEG      Urobilinogen 1.0 0.2 - 1.0 EU/dL    Nitrites NEGATIVE  NEG      Leukocyte Esterase NEGATIVE  NEG      WBC 0-4 0 - 4 /hpf    RBC 0-5 0 - 5 /hpf Epithelial cells FEW FEW /lpf    Bacteria NEGATIVE  NEG /hpf    UA:UC IF INDICATED CULTURE NOT INDICATED BY UA RESULT CNI      Hyaline cast 0-2 0 - 5 /lpf       Immunizations administered during this encounter:   Immunization History   Administered Date(s) Administered    Influenza Vaccine 10/01/2017       Screening for Metabolic Disorders for Patients on Antipsychotic Medications  (Data obtained from the EMR)    Estimated Body Mass Index  Estimated body mass index is 29.76 kg/(m^2) as calculated from the following:    Height as of 18: 6' 1\" (1.854 m). Weight as of 18: 102.3 kg (225 lb 8.5 oz). Vital Signs/Blood Pressure  Visit Vitals    /89 (BP 1 Location: Right arm, BP Patient Position: At rest;Sitting)    Pulse (!) 55    Temp 98.5 °F (36.9 °C)    Resp 16    SpO2 97%       Blood Glucose/Hemoglobin A1c  Lab Results   Component Value Date/Time    Glucose 95 2018 06:05 AM    Glucose (POC) 150 (H) 2018 11:45 AM       Lab Results   Component Value Date/Time    Hemoglobin A1c 4.8 2018 04:29 AM        Lipid Panel  Lab Results   Component Value Date/Time    Cholesterol, total 152 2012 03:30 AM    HDL Cholesterol 26 2012 03:30 AM    LDL, calculated 92.2 2012 03:30 AM    Triglyceride 169 (H) 2012 03:30 AM    CHOL/HDL Ratio 5.8 (H) 2012 03:30 AM        Discharge Diagnosis: Major depression  (ICD-10-CM: F32.9)    Discharge Plan: Patient discharged to [facility] via taxi. DISCHARGE SUMMARY:  Substance Abuse    NAME:Wei Henry  : 1965  MRN: [id number]    The patient Chapincito Sanchez exhibits the ability to function in a less restrictive environment. There has been no evidence of withdrawal for the past 24 hours. No suicidal/homicidal threat or behavior has been observed for the past 24 hours. If weapons involved, how are they secured? No weapons involved. Is patient aware of and in agreement with discharge plan? Yes    Arrangements for medication:  Prescriptions given to patient. Referral for substance treatment? Patient is not using substances/Not applicable. Referral for smoking cessation needed? Yes, refused    Copy of discharge instructions to provider?:  [facility]; [employer]    Arrangements for transportation home:  Taxi to The [address] crisis number:  911 or your local mental health crisis line number at [phone]. Discharge Medication List and Instructions:   Discharge Medication List as of 6/8/2018  3:07 PM      START taking these medications    Details   escitalopram oxalate (LEXAPRO) 5 mg tablet Take 1 Tab by mouth daily. Indications: Generalized Anxiety Disorder, major depressive disorder, Print, Disp-15 Tab, R-1         CONTINUE these medications which have NOT CHANGED    Details   lisinopril (PRINIVIL, ZESTRIL) 20 mg tablet Take 20 mg by mouth daily. Indications: hypertension, Historical Med      ammonium lactate (AMLACTIN) 12 % topical cream Apply  to affected area two (2) times a day. rub in to affected area well   Indications: DRY SKIN, Historical Med      tamsulosin (FLOMAX) 0.4 mg capsule Take 0.4 mg by mouth daily. Indications: bladder cancer, Historical Med      triamcinolone acetonide (KENALOG) 0.1 % topical cream Apply  to affected area. use thin layer, Historical Med      aspirin delayed-release 81 mg tablet Take 81 mg by mouth daily. , Historical Med      METHADONE HCL (METHADONE PO) Take 105 mg by mouth daily. Patient takes liquid methadone. , Historical Med      colchicine (COLCRYS) 0.6 mg tablet Take 1 Tab by mouth daily as needed. For gout flare, Print, Disp-30 Tab, R-0      allopurinol (ZYLOPRIM) 100 mg tablet Take 1 Tab by mouth daily. , Print, Disp-30 Tab, R-0      levothyroxine (SYNTHROID) 75 mcg tablet Take 1 Tab by mouth Daily (before breakfast). , Print, Disp-30 Tab, R-0             Unresulted Labs     None        To obtain results of studies pending at discharge, please contact [phone]    Follow-up Information     Follow up With Details Comments [id number] Tam Victoria MD   [address]  [phone]      [facility] Go on 6/8/2018 Patient will discharge directly to [facility]. [address] [place] [address]  [phone]          Advanced Directive:   Does the patient have an appointed surrogate decision maker? No  Does the patient have a Medical Advance Directive? No  Does the patient have a Psychiatric Advance Directive? No  If the patient does not have a surrogate or Medical Advance Directive AND Psychiatric Advance Directive, the patient was offered information on these advance directives Yes and Patient declined to complete    Patient Instructions: Please continue all medications until otherwise directed by physician. Tobacco Cessation Discharge Plan:   Is the patient a smoker and needs referral for smoking cessation? Yes  Patient referred to the following for smoking cessation with an appointment? Refused     Patient was offered medication to assist with smoking cessation at discharge? Refused  Was education for smoking cessation added to the discharge instructions? Yes    Alcohol/Substance Abuse Discharge Plan:   Does the patient have a history of substance/alcohol abuse and requires a referral for treatment? No  Patient referred to the following for substance/alcohol abuse treatment with an appointment? Refused  Patient was offered medication to assist with alcohol cessation at discharge? Refused  Was education for substance/alcohol abuse added to discharge instructions? Yes    Patient discharged to Home; discussed with patient/caregiver and provided to the patient/caregiver either in hard copy or electronically.

## 2018-06-08 NOTE — DISCHARGE INSTRUCTIONS
DISCHARGE SUMMARY:  Substance Abuse    NAME:Wei Henry  : 1965  MRN: [id number]    The patient Tad Vásquez exhibits the ability to function in a less restrictive environment. There has been no evidence of withdrawal for the past 24 hours. No suicidal/homicidal threat or behavior has been observed for the past 24 hours. If weapons involved, how are they secured? No weapons involved. Is patient aware of and in agreement with discharge plan? Yes    Arrangements for medication:  Prescriptions given to patient. Referral for substance treatment? Patient is not using substances/Not applicable. Referral for smoking cessation needed? Yes, refused    Copy of discharge instructions to provider?:  [facility]; [employer]    Arrangements for transportation home:  Taxi to [facility] [address] crisis number:  911 or your local mental health crisis line number at [phone]. DISCHARGE SUMMARY from Nurse    PATIENT INSTRUCTIONS: Left foot: remove dressing, apply thin layer of 2% lidocaine jelly and let it rest for a couple of minutes, clean with saline, apply Aquacel AG with dry cover.        What to do at Home:  Recommended activity: Activity as tolerated    If you experience any of the following symptoms feeling overwhelmed with hopelessness, please follow up with your assigned providers and local crisis number at [phone]. *  Please give a list of your current medications to your Primary Care Provider. *  Please update this list whenever your medications are discontinued, doses are      changed, or new medications (including over-the-counter products) are added. *  Please carry medication information at all times in case of emergency situations.     These are general instructions for a healthy lifestyle:    No smoking/ No tobacco products/ Avoid exposure to second hand smoke  Surgeon General's Warning:  Quitting smoking now greatly reduces serious risk to your health. Obesity, smoking, and sedentary lifestyle greatly increases your risk for illness    A healthy diet, regular physical exercise & weight monitoring are important for maintaining a healthy lifestyle    You may be retaining fluid if you have a history of heart failure or if you experience any of the following symptoms:  Weight gain of 3 pounds or more overnight or 5 pounds in a week, increased swelling in our hands or feet or shortness of breath while lying flat in bed. Please call your doctor as soon as you notice any of these symptoms; do not wait until your next office visit. Recognize signs and symptoms of STROKE:    F-face looks uneven    A-arms unable to move or move unevenly    S-speech slurred or non-existent    T-time-call 911 as soon as signs and symptoms begin-DO NOT go       Back to bed or wait to see if you get better-TIME IS BRAIN. Warning Signs of HEART ATTACK     Call 911 if you have these symptoms:   Chest discomfort. Most heart attacks involve discomfort in the center of the chest that lasts more than a few minutes, or that goes away and comes back. It can feel like uncomfortable pressure, squeezing, fullness, or pain.  Discomfort in other areas of the upper body. Symptoms can include pain or discomfort in one or both arms, the back, neck, jaw, or stomach.  Shortness of breath with or without chest discomfort.  Other signs may include breaking out in a cold sweat, nausea, or lightheadedness. Don't wait more than five minutes to call 911 - MINUTES MATTER! Fast action can save your life. Calling 911 is almost always the fastest way to get lifesaving treatment. Emergency Medical Services staff can begin treatment when they arrive -- up to an hour sooner than if someone gets to the hospital by car. The discharge information has been reviewed with the patient. The patient verbalized understanding.   Discharge medications reviewed with the patient and appropriate educational materials and side effects teaching were provided. ___________________________________________________________________________________________________________________________________      Blood in the Urine: Care Instructions  Your Care Instructions    Blood in the urine, or hematuria, may make the urine look red, brown, or pink. There may be blood every time you urinate or just from time to time. You cannot always see blood in the urine, but it will show up in a urine test.  Blood in the urine may be serious. It should always be checked by a doctor. Your doctor may recommend more tests, including an X-ray, a CT scan, or a cystoscopy (which lets a doctor look inside the urethra and bladder). Blood in the urine can be a sign of another problem. Common causes are bladder infections and kidney stones. An injury to your groin or your genital area can also cause bleeding in the urinary tract. Very hard exercise-such as running a marathon-can cause blood in the urine. Blood in the urine can also be a sign of kidney disease or cancer in the bladder or kidney. Many cases of blood in the urine are caused by a harmless condition that runs in families. This is called benign familial hematuria. It does not need any treatment. Sometimes your urine may look red or brown even though it does not contain blood. For example, not getting enough fluids (dehydration), taking certain medicines, or having a liver problem can change the color of your urine. Eating foods such as beets, rhubarb, or blackberries or foods with red food coloring can make your urine look red or pink. Follow-up care is a key part of your treatment and safety. Be sure to make and go to all appointments, and call your doctor if you are having problems. It's also a good idea to know your test results and keep a list of the medicines you take. When should you call for help?   Call your doctor now or seek immediate medical care if:  · You have symptoms of a urinary infection. For example:  ¨ You have pus in your urine. ¨ You have pain in your back just below your rib cage. This is called flank pain. ¨ You have a fever, chills, or body aches. ¨ It hurts to urinate. ¨ You have groin or belly pain. · You have more blood in your urine. Watch closely for changes in your health, and be sure to contact your doctor if:  · You have new urination problems. · You do not get better as expected. Where can you learn more? Go to http://joan-arvin.info/. Enter R915 in the search box to learn more about \"Blood in the Urine: Care Instructions. \"  Current as of: May 12, 2017  Content Version: 11.4  © 3641-6764 [employer]. Care instructions adapted under license by [employer] (which disclaims liability or warranty for this information).  If you have questions about a medical condition or this instruction, always ask your healthcare professional. [address] any warranty or liability for your use of this information.

## 2018-06-18 NOTE — DISCHARGE SUMMARY
PSYCHIATRIC DISCHARGE SUMMARY         IDENTIFICATION:    Patient Name  Rene Ward   Date of Birth 1965   [facility] [id number]   Medical Record Number  [id number]      Age  47 y.o. PCP Sangeeta Hernandez MD   Admit date:  6/6/2018    Discharge date: 6/8/18   Room Number  729/02  @ [facility]   Date of Service  6/8/18            TYPE OF DISCHARGE: REGULAR               CONDITION AT DISCHARGE: improved       PROVISIONAL & DISCHARGE DIAGNOSES:    Problem List  Date Reviewed: 5/9/2018          Codes Class    * (Principal)Major depression ICD-10-CM: F32.9  ICD-9-CM: 720.85         Uncomplicated opioid dependence ([address] ICD-10-CM: F11.20  ICD-9-CM: 304.00         Left foot infection ICD-10-CM: L08.9  ICD-9-CM: 641. 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 3         Chronic pain ICD-10-CM: G89.29  ICD-9-CM: 338.29     Overview Signed 3/6/2018 12:03 PM by Polly He     Last Assessment & Plan:   [facility]  Anomalous Vertebra?    No    Allergies that may influence a procedure:   no    Medications that may influence a procedure:   No    PMSH that may influence a procedure:   no    Diagnostic Tests:  EMG/NCS: not done   MRI: Images independently viewed, agree with report   X-ray: Images independently viewed, agree with report     Prior treatments:  Physical Therapy: Yes  Medications tried: opioids  Outside Records: All outside records that may have been scanned into [facility] have been reviewed and discussed with the patient. Procedures: none    This patient seen in consultation referred by Dr. Anjana Dallas For neck pain   RECOMMENDATIONS:  - Oxycodone   - Oxycontin  - Discharge from practice    - High risk of cervical interventions discussed (spinal cord injury, paralysis, seizure, stroke, death)    If fails to progress,  - spine surgery consultation for open surgical procedure  - pain medicine consultation if non-operative care chosen    Key diagnostic test images:  reviewed             Neurologic gait dysfunction ICD-10-CM: R26.9  ICD-9-CM: 781.2         Spasticity ICD-10-CM: R25.2  ICD-9-CM: 781.0         Cerebral infarction ([facility] ICD-10-CM: I63.9  ICD-9-CM: 434.91         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  ICD-9-CM: 338.0         Cerebral hemorrhage with hemiparesis ([facility] ICD-10-CM: I62.9, G81.90  ICD-9-CM: 431, 342.90         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  ICD-9-CM: 338.0         Stroke ([facility] ICD-10-CM: I63.9  ICD-9-CM: 434.91         Hypertension ICD-10-CM: I10  ICD-9-CM: 401.9         Thromboembolism (HCC) ICD-10-CM: I74.9  ICD-9-CM: 444.9               Active Hospital Problems    *Major depression      Uncomplicated opioid dependence ([facility]        DISCHARGE DIAGNOSIS:   Axis I:  SEE ABOVE  Axis II: SEE ABOVE  Axis III: SEE ABOVE  Axis IV:  lack of structure  Axis V:  45 on admission, 55 on discharge 65 (baseline)       CC & HISTORY OF PRESENT ILLNESS:  The patient, Marta Yousif, is a 47 y.o.   WHITE [place]  male with a past psychiatric history significant for alcohol dependence,opiate dependence, chronic pain s/p aneurysm and cva 10 years ago, recently diagnosed with bladder cancer and s/p tumor removal last week, who presents at this time with complaints of (and/or evidence of) the following emotional symptoms: depression and suicidal thoughts/threats since learning about his cancer. He has worried about having cancer his whole life because both of his parents  from cancer. He is worried about his future, particularly with limited support. Additional symptomatology include hopelessness, worry and severe pain . The above symptoms have been present for several weeks, but chronic intermittent depression. These symptoms are of moderate to high severity. These symptoms are constant in nature. He denies any substance abuse, but chart review reflects severe alcohol dependence in the past and opiate dependence currently methadone. SOCIAL HISTORY:    Social History     Social History    Marital status:      Spouse name: N/A    Number of children: N/A    Years of education: N/A     Occupational History    Not on file. Social History Main Topics    Smoking status: Current Every Day Smoker     Packs/day: 1.00    Smokeless tobacco: Never Used    Alcohol use 8.4 oz/week     14 Cans of beer per week    Drug use: Yes     Special: Prescription    Sexual activity: Yes     Other Topics Concern    Not on file     Social History Narrative      FAMILY HISTORY:   Family History   Problem Relation Age of Onset    Diabetes Father     Diabetes Sister     Diabetes Brother     Cancer Paternal Grandfather      Bladder             HOSPITALIZATION COURSE:    Rogelio Guerrero was admitted to the inpatient psychiatric unit [facility] for acute psychiatric stabilization in regards to symptomatology as described in the HPI above. The differential diagnosis at time of admission included: BETTIE vs MDD vs. Opiate dependence. While on the unit Rogelio Guerrero was involved in individual, group, occupational and milieu therapy. Psychiatric medications were adjusted during this hospitalization including Lexapro. Rogelio Guerrero demonstrated a slow, but progressive improvement in overall condition.   Much of patient's depression appeared to be related to situational stressors, effects of drugs of abuse, and psychological factors. Please see individual progress notes for more specific details regarding patient's hospitalization course. At time of discharge, Maria De Jesus Ann is without significant problems of depression. Patient free of suicidal and homicidal ideations (appears to be at very low risk of suicide or homicide) and reports many positive predictive factors in terms of not attempting suicide or homicide. Overall presentation at time of discharge is most consistent with the diagnosis of depression and anxiety. Patient with request for discharge today. There are no grounds to seek a TDO. Patient has maximized benefit to be derived from acute inpatient psychiatric treatment. All members of the treatment team concur with each other in regards to plans for discharge today per patient's request.  Patient and family are aware and in agreement with discharge and discharge plan.              LABS AND IMAGING:    Labs Reviewed - No data to display  Lab Results   Component Value Date/Time    Valproic acid 99 10/10/2016 04:23 AM     Admission on 06/05/2018, Discharged on 06/06/2018   Component Date Value Ref Range Status    AMPHETAMINES 06/06/2018 NEGATIVE   NEG   Final    BARBITURATES 06/06/2018 NEGATIVE   NEG   Final    BENZODIAZEPINES 06/06/2018 NEGATIVE   NEG   Final    COCAINE 06/06/2018 NEGATIVE   NEG   Final    METHADONE 06/06/2018 POSITIVE* NEG   Final    OPIATES 06/06/2018 NEGATIVE   NEG   Final    PCP(PHENCYCLIDINE) 06/06/2018 NEGATIVE   NEG   Final    THC (TH-CANNABINOL) 06/06/2018 NEGATIVE   NEG   Final    Drug screen comment 06/06/2018 (NOTE)   Final    Color 06/06/2018 YELLOW/STRAW    Final    Appearance 06/06/2018 CLEAR  CLEAR   Final    Specific gravity 06/06/2018 1.011  1.003 - 1.030   Final    pH (UA) 06/06/2018 6.0  5.0 - 8.0   Final    Protein 06/06/2018 NEGATIVE   NEG mg/dL Final    Glucose 06/06/2018 NEGATIVE   NEG mg/dL Final    Ketone 06/06/2018 NEGATIVE   NEG mg/dL Final    Bilirubin 06/06/2018 NEGATIVE   NEG   Final    Blood 06/06/2018 NEGATIVE   NEG   Final    Urobilinogen 06/06/2018 1.0  0.2 - 1.0 EU/dL Final    Nitrites 06/06/2018 NEGATIVE   NEG   Final    Leukocyte Esterase 06/06/2018 NEGATIVE   NEG   Final    WBC 06/06/2018 0-4  0 - 4 /hpf Final    RBC 06/06/2018 0-5  0 - 5 /hpf Final    Epithelial cells 06/06/2018 FEW  FEW /lpf Final    Bacteria 06/06/2018 NEGATIVE   NEG /hpf Final    UA:UC IF INDICATED 06/06/2018 CULTURE NOT INDICATED BY UA RESULT  CNI   Final    Hyaline cast 06/06/2018 0-2  0 - 5 /lpf Final   Admission on 06/05/2018, Discharged on 06/05/2018   Component Date Value Ref Range Status    Color 06/05/2018 YELLOW/STRAW    Final    Appearance 06/05/2018 CLEAR  CLEAR   Final    Specific gravity 06/05/2018 1.010  1.003 - 1.030   Final    pH (UA) 06/05/2018 6.0  5.0 - 8.0   Final    Protein 06/05/2018 NEGATIVE   NEG mg/dL Final    Glucose 06/05/2018 NEGATIVE   NEG mg/dL Final    Ketone 06/05/2018 NEGATIVE   NEG mg/dL Final    Bilirubin 06/05/2018 NEGATIVE   NEG   Final    Blood 06/05/2018 NEGATIVE   NEG   Final    Urobilinogen 06/05/2018 1.0  0.2 - 1.0 EU/dL Final    Nitrites 06/05/2018 NEGATIVE   NEG   Final    Leukocyte Esterase 06/05/2018 NEGATIVE   NEG   Final    ALCOHOL(ETHYL),SERUM 06/05/2018 <10  <10 MG/DL Final    Salicylate level 00/51/1413 4.5  2.8 - 20.0 MG/DL Final    Acetaminophen level 06/05/2018 <2* 10 - 30 ug/mL Final    WBC 06/05/2018 10.9  4.1 - 11.1 K/uL Final    RBC 06/05/2018 4.15  4.10 - 5.70 M/uL Final    HGB 06/05/2018 11.8* 12.1 - 17.0 g/dL Final    HCT 06/05/2018 35.6* 36.6 - 50.3 % Final    MCV 06/05/2018 85.8  80.0 - 99.0 FL Final    MCH 06/05/2018 28.4  26.0 - 34.0 PG Final    MCHC 06/05/2018 33.1  30.0 - 36.5 g/dL Final    RDW 06/05/2018 14.7* 11.5 - 14.5 % Final    PLATELET 00/42/9834 292  150 - 400 K/uL Final    MPV 06/05/2018 10.3  8.9 - 12.9 FL Final    NRBC 06/05/2018 0.0  0  WBC Final    ABSOLUTE NRBC 06/05/2018 0.00  0.00 - 0.01 K/uL Final    NEUTROPHILS 06/05/2018 60  32 - 75 % Final    LYMPHOCYTES 06/05/2018 29  12 - 49 % Final    MONOCYTES 06/05/2018 6  5 - 13 % Final    EOSINOPHILS 06/05/2018 4  0 - 7 % Final    BASOPHILS 06/05/2018 1  0 - 1 % Final    IMMATURE GRANULOCYTES 06/05/2018 1* 0.0 - 0.5 % Final    ABS. NEUTROPHILS 06/05/2018 6.6  1.8 - 8.0 K/UL Final    ABS. LYMPHOCYTES 06/05/2018 3.1  0.8 - 3.5 K/UL Final    ABS. MONOCYTES 06/05/2018 0.7  0.0 - 1.0 K/UL Final    ABS. EOSINOPHILS 06/05/2018 0.5* 0.0 - 0.4 K/UL Final    ABS. BASOPHILS 06/05/2018 0.1  0.0 - 0.1 K/UL Final    ABS. IMM. GRANS. 06/05/2018 0.1* 0.00 - 0.04 K/UL Final    DF 06/05/2018 AUTOMATED    Final    Sodium 06/05/2018 140  136 - 145 mmol/L Final    Potassium 06/05/2018 3.9  3.5 - 5.1 mmol/L Final    Chloride 06/05/2018 107  97 - 108 mmol/L Final    CO2 06/05/2018 26  21 - 32 mmol/L Final    Anion gap 06/05/2018 7  5 - 15 mmol/L Final    Glucose 06/05/2018 95  65 - 100 mg/dL Final    BUN 06/05/2018 10  6 - 20 MG/DL Final    Creatinine 06/05/2018 0.69* 0.70 - 1.30 MG/DL Final    BUN/Creatinine ratio 06/05/2018 14  12 - 20   Final    GFR est AA 06/05/2018 >60  >60 ml/min/1.73m2 Final    GFR est non-AA 06/05/2018 >60  >60 ml/min/1.73m2 Final    Calcium 06/05/2018 8.8  8.5 - 10.1 MG/DL Final    Bilirubin, total 06/05/2018 0.4  0.2 - 1.0 MG/DL Final    ALT (SGPT) 06/05/2018 15  12 - 78 U/L Final    AST (SGOT) 06/05/2018 9* 15 - 37 U/L Final    Alk.  phosphatase 06/05/2018 102  45 - 117 U/L Final    Protein, total 06/05/2018 6.8  6.4 - 8.2 g/dL Final    Albumin 06/05/2018 3.7  3.5 - 5.0 g/dL Final    Globulin 06/05/2018 3.1  2.0 - 4.0 g/dL Final    A-G Ratio 06/05/2018 1.2  1.1 - 2.2   Final    AMPHETAMINES 06/05/2018 NEGATIVE   NEG   Final    BARBITURATES 06/05/2018 NEGATIVE   NEG   Final    BENZODIAZEPINES 06/05/2018 NEGATIVE   NEG   Final    COCAINE 06/05/2018 NEGATIVE   NEG   Final    METHADONE 06/05/2018 POSITIVE* NEG   Final    OPIATES 06/05/2018 NEGATIVE   NEG   Final    PCP(PHENCYCLIDINE) 06/05/2018 NEGATIVE   NEG   Final    THC (TH-CANNABINOL) 06/05/2018 NEGATIVE   NEG   Final    Drug screen comment 06/05/2018 (NOTE)   Final     No results found. DISPOSITION:    Home. Patient to f/u with psychiatric, and psychotherapy appointments. Patient is to f/u with internist as directed. FOLLOW-UP CARE:    Activity as tolerated  Regular Diet  Wound Care: none needed. Follow-up Information     Follow up With Details Comments [id number] MD   [address]  [phone]      [facility] Go on 6/8/2018 Patient will discharge directly to [facility]. [address] [place] [address]  [phone]                 PROGNOSIS:   Good --- based on nature of patient's pathology/ies and treatment compliance issues. Prognosis is greatly dependent upon patient's ability to remain sober and to follow up with drug/etoh rehabilitation and psychiatric/psychotherapy appointments as well as to comply with psychiatric medications as prescribed. DISCHARGE MEDICATIONS:   Informed consent given for the use of following psychotropic medications:  Discharge Medication List as of 6/8/2018  3:07 PM      START taking these medications    Details   escitalopram oxalate (LEXAPRO) 5 mg tablet Take 1 Tab by mouth daily. Indications: Generalized Anxiety Disorder, major depressive disorder, Print, Disp-15 Tab, R-1         CONTINUE these medications which have NOT CHANGED    Details   lisinopril (PRINIVIL, ZESTRIL) 20 mg tablet Take 20 mg by mouth daily. Indications: hypertension, Historical Med      ammonium lactate (AMLACTIN) 12 % topical cream Apply  to affected area two (2) times a day.  rub in to affected area well   Indications: DRY SKIN, Historical Med      tamsulosin (FLOMAX) 0.4 mg capsule Take 0.4 mg by mouth daily. Indications: bladder cancer, Historical Med      triamcinolone acetonide (KENALOG) 0.1 % topical cream Apply  to affected area. use thin layer, Historical Med      aspirin delayed-release 81 mg tablet Take 81 mg by mouth daily. , Historical Med      METHADONE HCL (METHADONE PO) Take 105 mg by mouth daily. Patient takes liquid methadone. , Historical Med      colchicine (COLCRYS) 0.6 mg tablet Take 1 Tab by mouth daily as needed. For gout flare, Print, Disp-30 Tab, R-0      allopurinol (ZYLOPRIM) 100 mg tablet Take 1 Tab by mouth daily. , Print, Disp-30 Tab, R-0      levothyroxine (SYNTHROID) 75 mcg tablet Take 1 Tab by mouth Daily (before breakfast). , Print, Disp-30 Tab, R-0                    A coordinated, multidisplinary treatment team round was conducted with Helio Risk is done daily here at [facility]. This team consists of the nurse, psychiatric unit pharmcist,  and writer. I have spent greater than 35 minutes on discharge work.     Signed:  Herlinda Perry MD  6/8/18

## 2018-07-31 ENCOUNTER — HOSPITAL ENCOUNTER (EMERGENCY)
Age: 53
Discharge: ANOTHER HOSPITAL | End: 2018-08-01
Attending: EMERGENCY MEDICINE
Payer: MEDICAID

## 2018-07-31 DIAGNOSIS — G89.4 CHRONIC PAIN SYNDROME: Primary | ICD-10-CM

## 2018-07-31 DIAGNOSIS — F60.9 PERSONALITY DISORDER (HCC): ICD-10-CM

## 2018-07-31 LAB
ALBUMIN SERPL-MCNC: 4.3 G/DL (ref 3.5–5)
ALBUMIN/GLOB SERPL: 1.2 {RATIO} (ref 1.1–2.2)
ALP SERPL-CCNC: 157 U/L (ref 45–117)
ALT SERPL-CCNC: 30 U/L (ref 12–78)
AMPHET UR QL SCN: NEGATIVE
ANION GAP SERPL CALC-SCNC: 7 MMOL/L (ref 5–15)
APAP SERPL-MCNC: 2 UG/ML (ref 10–30)
APPEARANCE UR: CLEAR
AST SERPL-CCNC: 28 U/L (ref 15–37)
BACTERIA URNS QL MICRO: NEGATIVE /HPF
BARBITURATES UR QL SCN: NEGATIVE
BASOPHILS # BLD: 0.1 K/UL (ref 0–0.1)
BASOPHILS NFR BLD: 1 % (ref 0–1)
BENZODIAZ UR QL: NEGATIVE
BILIRUB SERPL-MCNC: 0.4 MG/DL (ref 0.2–1)
BILIRUB UR QL: NEGATIVE
BUN SERPL-MCNC: 4 MG/DL (ref 6–20)
BUN/CREAT SERPL: 5 (ref 12–20)
CALCIUM SERPL-MCNC: 8.7 MG/DL (ref 8.5–10.1)
CANNABINOIDS UR QL SCN: NEGATIVE
CHLORIDE SERPL-SCNC: 100 MMOL/L (ref 97–108)
CO2 SERPL-SCNC: 29 MMOL/L (ref 21–32)
COCAINE UR QL SCN: NEGATIVE
COLOR UR: NORMAL
CREAT SERPL-MCNC: 0.78 MG/DL (ref 0.7–1.3)
DIFFERENTIAL METHOD BLD: ABNORMAL
DRUG SCRN COMMENT,DRGCM: ABNORMAL
EOSINOPHIL # BLD: 0.2 K/UL (ref 0–0.4)
EOSINOPHIL NFR BLD: 2 % (ref 0–7)
EPITH CASTS URNS QL MICRO: NORMAL /LPF
ERYTHROCYTE [DISTWIDTH] IN BLOOD BY AUTOMATED COUNT: 15.7 % (ref 11.5–14.5)
ETHANOL SERPL-MCNC: 74 MG/DL
GLOBULIN SER CALC-MCNC: 3.5 G/DL (ref 2–4)
GLUCOSE SERPL-MCNC: 77 MG/DL (ref 65–100)
GLUCOSE UR STRIP.AUTO-MCNC: NEGATIVE MG/DL
HCT VFR BLD AUTO: 44.1 % (ref 36.6–50.3)
HGB BLD-MCNC: 14.3 G/DL (ref 12.1–17)
HGB UR QL STRIP: NEGATIVE
HYALINE CASTS URNS QL MICRO: NORMAL /LPF (ref 0–5)
IMM GRANULOCYTES # BLD: 0.1 K/UL (ref 0–0.04)
IMM GRANULOCYTES NFR BLD AUTO: 1 % (ref 0–0.5)
KETONES UR QL STRIP.AUTO: NEGATIVE MG/DL
LEUKOCYTE ESTERASE UR QL STRIP.AUTO: NEGATIVE
LIPASE SERPL-CCNC: 48 U/L (ref 73–393)
LYMPHOCYTES # BLD: 2.5 K/UL (ref 0.8–3.5)
LYMPHOCYTES NFR BLD: 27 % (ref 12–49)
MCH RBC QN AUTO: 27.6 PG (ref 26–34)
MCHC RBC AUTO-ENTMCNC: 32.4 G/DL (ref 30–36.5)
MCV RBC AUTO: 85 FL (ref 80–99)
METHADONE UR QL: POSITIVE
MONOCYTES # BLD: 0.5 K/UL (ref 0–1)
MONOCYTES NFR BLD: 5 % (ref 5–13)
NEUTS SEG # BLD: 6.1 K/UL (ref 1.8–8)
NEUTS SEG NFR BLD: 65 % (ref 32–75)
NITRITE UR QL STRIP.AUTO: NEGATIVE
NRBC # BLD: 0 K/UL (ref 0–0.01)
NRBC BLD-RTO: 0 PER 100 WBC
OPIATES UR QL: NEGATIVE
PCP UR QL: NEGATIVE
PH UR STRIP: 6 [PH] (ref 5–8)
PLATELET # BLD AUTO: 287 K/UL (ref 150–400)
PMV BLD AUTO: 10.1 FL (ref 8.9–12.9)
POTASSIUM SERPL-SCNC: 3.9 MMOL/L (ref 3.5–5.1)
PROT SERPL-MCNC: 7.8 G/DL (ref 6.4–8.2)
PROT UR STRIP-MCNC: NEGATIVE MG/DL
RBC # BLD AUTO: 5.19 M/UL (ref 4.1–5.7)
RBC #/AREA URNS HPF: NORMAL /HPF (ref 0–5)
SALICYLATES SERPL-MCNC: 4.1 MG/DL (ref 2.8–20)
SODIUM SERPL-SCNC: 136 MMOL/L (ref 136–145)
SP GR UR REFRACTOMETRY: 1.01 (ref 1–1.03)
UA: UC IF INDICATED,UAUC: NORMAL
UROBILINOGEN UR QL STRIP.AUTO: 0.2 EU/DL (ref 0.2–1)
WBC # BLD AUTO: 9.4 K/UL (ref 4.1–11.1)
WBC URNS QL MICRO: NORMAL /HPF (ref 0–4)

## 2018-07-31 PROCEDURE — 36415 COLL VENOUS BLD VENIPUNCTURE: CPT | Performed by: EMERGENCY MEDICINE

## 2018-07-31 PROCEDURE — 85025 COMPLETE CBC W/AUTO DIFF WBC: CPT | Performed by: EMERGENCY MEDICINE

## 2018-07-31 PROCEDURE — 80307 DRUG TEST PRSMV CHEM ANLYZR: CPT | Performed by: EMERGENCY MEDICINE

## 2018-07-31 PROCEDURE — 74011250636 HC RX REV CODE- 250/636: Performed by: EMERGENCY MEDICINE

## 2018-07-31 PROCEDURE — 96361 HYDRATE IV INFUSION ADD-ON: CPT

## 2018-07-31 PROCEDURE — 74011250637 HC RX REV CODE- 250/637: Performed by: EMERGENCY MEDICINE

## 2018-07-31 PROCEDURE — 99285 EMERGENCY DEPT VISIT HI MDM: CPT

## 2018-07-31 PROCEDURE — 81001 URINALYSIS AUTO W/SCOPE: CPT | Performed by: EMERGENCY MEDICINE

## 2018-07-31 PROCEDURE — 83690 ASSAY OF LIPASE: CPT | Performed by: EMERGENCY MEDICINE

## 2018-07-31 PROCEDURE — 80053 COMPREHEN METABOLIC PANEL: CPT | Performed by: EMERGENCY MEDICINE

## 2018-07-31 PROCEDURE — 96360 HYDRATION IV INFUSION INIT: CPT

## 2018-07-31 RX ORDER — SODIUM CHLORIDE 9 MG/ML
1000 INJECTION, SOLUTION INTRAVENOUS ONCE
Status: COMPLETED | OUTPATIENT
Start: 2018-07-31 | End: 2018-08-01

## 2018-07-31 RX ORDER — OXYCODONE HYDROCHLORIDE 5 MG/1
10 TABLET ORAL
Status: COMPLETED | OUTPATIENT
Start: 2018-07-31 | End: 2018-07-31

## 2018-07-31 RX ADMIN — SODIUM CHLORIDE 1000 ML: 900 INJECTION, SOLUTION INTRAVENOUS at 17:22

## 2018-07-31 RX ADMIN — OXYCODONE HYDROCHLORIDE 10 MG: 5 TABLET ORAL at 18:47

## 2018-07-31 NOTE — ED NOTES
Bedside shift change report given to Jessica Jimenez RN (oncoming nurse) by Sammy Gomez RN (offgoing nurse). Report included the following information SBAR, Kardex, ED Summary, Intake/Output, MAR and Recent Results.

## 2018-07-31 NOTE — ED PROVIDER NOTES
EMERGENCY DEPARTMENT HISTORY AND PHYSICAL EXAM      Date: 7/31/2018  Patient Name: Shalom Castellano    History of Presenting Illness     Chief Complaint   Patient presents with    Foot Pain     Pt wheeled to triage, states x 1.5 years ago a heat pump fell on his foot, has been following up with Dutch Dawson with [facility], referred for worsening wound, pain to L foot       History Provided By: Patient    HPI: Shalom Castellano, 47 y.o. male with PMHx significant for TIA with left sided deficit, aneurysm, PE, HCL, HTN, seizures, bladder tumor s/p resection, presents via wheelchair to the ED referred by [facility] with cc of worsening left foot pain secondary to injury x 1.5 years. Pt notes that 1.5 years ago a heat pump fell on his left foot and has since been following up with Dr. Marilyn López at [address] [place] [address],[address] [facility]. He reports that he has been since checked multiple times for infection to the wounds at each visit and denies that any infection was found. Pt further notes chronic left sided nerve pain secondary to a hx of brain aneurysm and that it makes his pain \"unbearable without medication. \" He states that he is currently undergoing Methadone tx for his sxs, reporting that he is currently on 105 mg. Pt denies a hx of DM. He denies any alleviating factors for his sxs. Pt specifically denies any fever, chills, vomiting, diarrhea, abdominal pain, CP, HA, SOB, dysuria or hematuria. He notes some intermittent, chronic constipation. There are no other complaints, changes, or physical findings at this time. Social Hx: + EtOH; + Smoker; + Illicit Drugs (opiate addiction)  Family Hx: DM, bladder CA    PCP: Jeanne Cody MD    Current Outpatient Prescriptions   Medication Sig Dispense Refill    escitalopram oxalate (LEXAPRO) 5 mg tablet Take 1 Tab by mouth daily. Indications: Generalized Anxiety Disorder, major depressive disorder 15 Tab 1    lisinopril (PRINIVIL, ZESTRIL) 20 mg tablet Take 20 mg by mouth daily. Indications: hypertension      ammonium lactate (AMLACTIN) 12 % topical cream Apply  to affected area two (2) times a day. rub in to affected area well   Indications: DRY SKIN      tamsulosin (FLOMAX) 0.4 mg capsule Take 0.4 mg by mouth daily. Indications: bladder cancer      triamcinolone acetonide (KENALOG) 0.1 % topical cream Apply  to affected area. use thin layer      aspirin delayed-release 81 mg tablet Take 81 mg by mouth daily.  METHADONE HCL (METHADONE PO) Take 105 mg by mouth daily. Patient takes liquid methadone.  colchicine (COLCRYS) 0.6 mg tablet Take 1 Tab by mouth daily as needed. For gout flare 30 Tab 0    allopurinol (ZYLOPRIM) 100 mg tablet Take 1 Tab by mouth daily. 30 Tab 0    levothyroxine (SYNTHROID) 75 mcg tablet Take 1 Tab by mouth Daily (before breakfast). 30 Tab 0       Past History     Past Medical History:  Past Medical History:   Diagnosis Date    Aneurysm ([address]     (with stroke)    Bladder tumor     Family history of bladder cancer     Gout     Hypercholesterolemia     Hypertension     Lesion of bladder     Seizures ([address]     Stroke ([facility] [address] [facility] 2006    left sided weakness    Thromboembolus ([address]     Thyroid disease     TIA (transient ischemic attack) 2012       Past Surgical History:  Past Surgical History:   Procedure Laterality Date    HX UROLOGICAL  04/20/2018    Cystoscopy, TURBT (greater than 5 cm resected) Dr. Italo Rios, [facility].  KNEE ARTHROSCP HARV      left knee       Family History:  Family History   Problem Relation Age of Onset    Diabetes Father     Diabetes Sister     Diabetes Brother     Cancer Paternal Grandfather      Bladder       Social History:  Social History   Substance Use Topics    Smoking status: Current Every Day Smoker     Packs/day: 1.00    Smokeless tobacco: Never Used    Alcohol use 8.4 oz/week     14 Cans of beer per week       Allergies:   Allergies   Allergen Reactions    Sulfamethoxazole-Trimethoprim Rash     L eye and L hand    Ciprofloxacin Hives     Per pcp records    Codeine Hives     Tolerates dilaudid, oxycodone    Lyrica [Pregabalin] Hives    Sulfa (Sulfonamide Antibiotics) Rash    Ultram [Tramadol] Hives         Review of Systems   Review of Systems   Constitutional: Negative for chills and fever. HENT: Negative for congestion. Eyes: Negative for visual disturbance. Respiratory: Negative for chest tightness. Cardiovascular: Negative for chest pain and leg swelling. Gastrointestinal: Negative for abdominal pain and vomiting. Endocrine: Negative for polyuria. Genitourinary: Negative for dysuria, frequency and hematuria. Musculoskeletal: Positive for myalgias (left foot). Skin: Positive for color change (chronic changes R foot). Allergic/Immunologic: Negative for immunocompromised state. Neurological: Positive for weakness (chronic left sided). Negative for numbness. Chronic L sided \"nerve\" pain   Psychiatric/Behavioral: Positive for dysphoric mood and suicidal ideas. Physical Exam   Physical Exam   Nursing note and vitals reviewed.   General appearance: non-toxic  Eyes: PERRL, EOMI, conjunctiva normal, anicteric sclera  HEENT: mucous membranes slightly tacky, oropharynx is clear  Pulmonary: clear to auscultation bilaterally  Cardiac: normal rate and regular rhythm, no murmurs, gallops, or rubs, 2+DP pulses, 2+ radial pulses  Abdomen: soft, nontender despite report from podiatry office, nondistended, bowel sounds present  MSK: LLE 1+ pitting edema  Neuro: Alert, answers questions appropriately, chronic L sided weakness d/t hx CVA  Skin: chronic appearing ulceration to base of L 2nd toe, nails dystrophic, surrounding hyperemia of foot, ulceration inferior to L medial malleolus, chronic stasis changes, capillary refill brisk  Diagnostic Study Results     Labs -     Labs available reviewed      Radiologic Studies -   No orders to display     CT Results  (Last 48 hours)    None        CXR Results  (Last 48 hours)    None            Medical Decision Making   I am the first provider for this patient. I reviewed the vital signs, available nursing notes, past medical history, past surgical history, family history and social history. Vital Signs-Reviewed the patient's vital signs. Patient Vitals for the past 12 hrs:   Temp Pulse Resp BP SpO2   08/01/18 0825 97.9 °F (36.6 °C) 69 18 175/90 95 %   08/01/18 0600 - (!) 56 18 156/89 94 %   08/01/18 0415 - 62 17 128/83 96 %   08/01/18 0100 - - - 153/84 96 %   08/01/18 0015 - - - (!) 180/95 96 %   07/31/18 2345 - - - (!) 176/96 96 %   07/31/18 2330 - - - 172/88 96 %       Pulse Oximetry Analysis - 96% on RA    Cardiac Monitor:   Rate: 92 bpm  Rhythm: Normal Sinus Rhythm      Records Reviewed: Nursing Notes, Old Medical Records, Previous Radiology Studies and Previous Laboratory Studies    Provider Notes (Medical Decision Making):   DDx: chronic pain syndrome, opioid dependence, chronic foot ulceration. Case reviewed with treating podiatrist. No additional workup or acute intervention indicated. ED Course:   Initial assessment performed. The patients presenting problems have been discussed, and they are in agreement with the care plan formulated and outlined with them. I have encouraged them to ask questions as they arise throughout their visit. Progress Note:  4:24 PM  Reza Uribe MD spoke with Bossman Eng, one of Dr. Katya Bradford Assistant's at [address] [place] [address],[facility] Floor and [facility]. She explains that the pt was sent to the ED after c/o abdominal pain in the office and Dr. Cristian Cortez refused to give the pt pain medication. Dr Cristian Cortez has seen and examined his rt foot; no additional intervention, therapy, or diagnostics are indicated currently based on Dr. Caitlin Braswell today. This was explicitly d/w Bossman Eng in his office. No further w/u in ED indicated at this point.       7:05 PM  Reza Uribe MD went to update pt and pt threatened to \"blow his brains out\" due to the pain. Will consult BSMART. 7:30 PM  Spoke with MARCELINA Cuenca. Will tele-psych with pt. Consult Note:  8:00 PM  Karolina Blunt MD, spoke with Brian Bell,  Specialty: [facility]  Discussed pt's hx, disposition, and available diagnostic and imaging results. Reviewed care plans. Consultant agrees with plans as outlined. She stated that she will talk to Dr. Jarret Hawley about admission. Consult Note:  9:00 PM  Karolina Blunt MD, spoke with Brian Bell,  Specialty: [facility]  Stated that Dr. Jarret Hawley requests admission, will search for bed placement. SIGN OUT:  11:54 PM  Patient's presentation, labs/imaging and plan of care was reviewed with Loli Marx MD as part of sign out. They will wait for transfer placement and do a last look as part of the plan discussed with the patient. Loli Kim MD's assistance in completion of this plan is greatly appreciated but it should be noted that I will be the provider of record for this patient. Karolina Blunt MD    PROGRESS NOTE:  2:00 AM  Pt states his foot pain is worsening at this time. Will treat with Oxycodone and continue to monitor. Written by Huber Dotson ED Scribe, as dictated by Ajay Gambino MD    PROGRESS NOTE:  6:09 AM  Pt sleeping comfortably throughout duration of the night. BSMART unable to find placement at this time. Will continue to search bed board into the morning. Written by Huber Dotson ED Scribe, as dictated by Ajay Gambnio MD    PROGRESS NOTE:  7:12 AM  Pt now c/o worsening foot pain will treat with additional Oxycodone and continue to await bed placement. Written by Huber Dotson ED Scribe, as dictated by Ajay Gambino MD    SIGN OUT:  7:15 AM  Patient's presentation, labs/imaging and plan of care was reviewed with Rolly Han MD as part of sign out. They will await bed placement and dispo as part of the plan discussed with the patient.     Rolly Han MD's assistance in completion of this plan is greatly appreciated but it should be noted that I will be the provider of record for this patient. Roby Soliman MD    Critical Care Time:   0 minutes    Disposition:    TRANSFER NOTE:  8:34 AM  Patient is being transferred to the care of  at [facility]. The results of their tests and reasons for their transfer have been discussed with them and/or available family. They convey agreement and understanding for the need to be transferred to another medical facility for further care. Consultation has been made with the accepting physician specialist(s). PLAN:  1. Transfer to [facility]    Diagnosis     Clinical Impression:   1. Chronic pain syndrome    2. Personality disorder      Attestations: This note is prepared by Amanda Villareal, acting as Scribe for MD Ledy Rachel MD: The scribe's documentation has been prepared under my direction and personally reviewed by me in its entirety. I confirm that the note above accurately reflects all work, treatment, procedures, and medical decision making performed by me.

## 2018-08-01 ENCOUNTER — HOSPITAL ENCOUNTER (INPATIENT)
Age: 53
LOS: 2 days | Discharge: HOME | DRG: 751 | End: 2018-08-03
Attending: PSYCHIATRY & NEUROLOGY | Admitting: PSYCHIATRY & NEUROLOGY
Payer: MEDICAID

## 2018-08-01 VITALS
HEIGHT: 73 IN | WEIGHT: 230 LBS | BODY MASS INDEX: 30.48 KG/M2 | DIASTOLIC BLOOD PRESSURE: 95 MMHG | RESPIRATION RATE: 18 BRPM | OXYGEN SATURATION: 95 % | TEMPERATURE: 98.1 F | HEART RATE: 69 BPM | SYSTOLIC BLOOD PRESSURE: 158 MMHG

## 2018-08-01 PROBLEM — F32.A DEPRESSIVE DISORDER: Status: ACTIVE | Noted: 2018-08-01

## 2018-08-01 PROCEDURE — 74011250637 HC RX REV CODE- 250/637: Performed by: PSYCHIATRY & NEUROLOGY

## 2018-08-01 PROCEDURE — 65220000003 HC RM SEMIPRIVATE PSYCH

## 2018-08-01 PROCEDURE — 74011250637 HC RX REV CODE- 250/637: Performed by: EMERGENCY MEDICINE

## 2018-08-01 RX ORDER — ESCITALOPRAM OXALATE 10 MG/1
5 TABLET ORAL DAILY
Status: DISCONTINUED | OUTPATIENT
Start: 2018-08-02 | End: 2018-08-02 | Stop reason: ALTCHOICE

## 2018-08-01 RX ORDER — LISINOPRIL 20 MG/1
20 TABLET ORAL DAILY
Status: DISCONTINUED | OUTPATIENT
Start: 2018-08-01 | End: 2018-08-03 | Stop reason: HOSPADM

## 2018-08-01 RX ORDER — COLCHICINE 0.6 MG/1
0.6 TABLET ORAL ONCE
Status: COMPLETED | OUTPATIENT
Start: 2018-08-01 | End: 2018-08-01

## 2018-08-01 RX ORDER — BENZTROPINE MESYLATE 1 MG/1
2 TABLET ORAL
Status: DISCONTINUED | OUTPATIENT
Start: 2018-08-01 | End: 2018-08-03 | Stop reason: HOSPADM

## 2018-08-01 RX ORDER — ADHESIVE BANDAGE
30 BANDAGE TOPICAL DAILY PRN
Status: DISCONTINUED | OUTPATIENT
Start: 2018-08-01 | End: 2018-08-03 | Stop reason: HOSPADM

## 2018-08-01 RX ORDER — LORAZEPAM 2 MG/ML
2 INJECTION INTRAMUSCULAR
Status: DISCONTINUED | OUTPATIENT
Start: 2018-08-01 | End: 2018-08-03 | Stop reason: HOSPADM

## 2018-08-01 RX ORDER — METHADONE HYDROCHLORIDE 5 MG/5ML
115 SOLUTION ORAL DAILY
Status: DISCONTINUED | OUTPATIENT
Start: 2018-08-01 | End: 2018-08-03 | Stop reason: HOSPADM

## 2018-08-01 RX ORDER — TAMSULOSIN HYDROCHLORIDE 0.4 MG/1
0.4 CAPSULE ORAL DAILY
Status: DISCONTINUED | OUTPATIENT
Start: 2018-08-01 | End: 2018-08-03 | Stop reason: HOSPADM

## 2018-08-01 RX ORDER — IBUPROFEN 400 MG/1
400 TABLET ORAL
Status: DISCONTINUED | OUTPATIENT
Start: 2018-08-01 | End: 2018-08-03 | Stop reason: HOSPADM

## 2018-08-01 RX ORDER — BENZTROPINE MESYLATE 1 MG/ML
2 INJECTION INTRAMUSCULAR; INTRAVENOUS
Status: DISCONTINUED | OUTPATIENT
Start: 2018-08-01 | End: 2018-08-03 | Stop reason: HOSPADM

## 2018-08-01 RX ORDER — ALLOPURINOL 100 MG/1
100 TABLET ORAL DAILY
Status: DISCONTINUED | OUTPATIENT
Start: 2018-08-01 | End: 2018-08-03 | Stop reason: HOSPADM

## 2018-08-01 RX ORDER — AMOXICILLIN 250 MG
2 CAPSULE ORAL DAILY
Status: DISCONTINUED | OUTPATIENT
Start: 2018-08-02 | End: 2018-08-03 | Stop reason: HOSPADM

## 2018-08-01 RX ORDER — LEVOTHYROXINE SODIUM 75 UG/1
75 TABLET ORAL
Status: DISCONTINUED | OUTPATIENT
Start: 2018-08-01 | End: 2018-08-03 | Stop reason: HOSPADM

## 2018-08-01 RX ORDER — OXYCODONE HYDROCHLORIDE 5 MG/1
5 TABLET ORAL
Status: COMPLETED | OUTPATIENT
Start: 2018-08-01 | End: 2018-08-01

## 2018-08-01 RX ORDER — LORAZEPAM 1 MG/1
1 TABLET ORAL
Status: DISCONTINUED | OUTPATIENT
Start: 2018-08-01 | End: 2018-08-03 | Stop reason: HOSPADM

## 2018-08-01 RX ORDER — ZOLPIDEM TARTRATE 10 MG/1
10 TABLET ORAL
Status: DISCONTINUED | OUTPATIENT
Start: 2018-08-01 | End: 2018-08-03 | Stop reason: HOSPADM

## 2018-08-01 RX ORDER — ACETAMINOPHEN 325 MG/1
650 TABLET ORAL
Status: DISCONTINUED | OUTPATIENT
Start: 2018-08-01 | End: 2018-08-03 | Stop reason: HOSPADM

## 2018-08-01 RX ORDER — LIDOCAINE AND PRILOCAINE 25; 25 MG/G; MG/G
CREAM TOPICAL DAILY
COMMUNITY
End: 2018-10-12

## 2018-08-01 RX ORDER — KETOCONAZOLE 20 MG/G
CREAM TOPICAL DAILY
COMMUNITY
End: 2018-09-28

## 2018-08-01 RX ORDER — METHADONE HYDROCHLORIDE 10 MG/ML
115 CONCENTRATE ORAL DAILY
COMMUNITY
End: 2018-10-12

## 2018-08-01 RX ORDER — IBUPROFEN 200 MG
1 TABLET ORAL
Status: DISCONTINUED | OUTPATIENT
Start: 2018-08-01 | End: 2018-08-03 | Stop reason: HOSPADM

## 2018-08-01 RX ORDER — OLANZAPINE 5 MG/1
5 TABLET ORAL
Status: DISCONTINUED | OUTPATIENT
Start: 2018-08-01 | End: 2018-08-03 | Stop reason: HOSPADM

## 2018-08-01 RX ORDER — OXYCODONE HYDROCHLORIDE 5 MG/1
10 TABLET ORAL
Status: COMPLETED | OUTPATIENT
Start: 2018-08-01 | End: 2018-08-01

## 2018-08-01 RX ADMIN — METHADONE HYDROCHLORIDE 115 MG: 5 SOLUTION ORAL at 14:55

## 2018-08-01 RX ADMIN — OXYCODONE HYDROCHLORIDE 10 MG: 5 TABLET ORAL at 02:19

## 2018-08-01 RX ADMIN — OXYCODONE HYDROCHLORIDE 5 MG: 5 TABLET ORAL at 08:23

## 2018-08-01 RX ADMIN — LISINOPRIL 20 MG: 20 TABLET ORAL at 14:55

## 2018-08-01 RX ADMIN — TAMSULOSIN HYDROCHLORIDE 0.4 MG: 0.4 CAPSULE ORAL at 14:55

## 2018-08-01 RX ADMIN — ACETAMINOPHEN 650 MG: 325 TABLET, FILM COATED ORAL at 21:22

## 2018-08-01 RX ADMIN — ZOLPIDEM TARTRATE 10 MG: 10 TABLET ORAL at 21:22

## 2018-08-01 RX ADMIN — ALLOPURINOL 100 MG: 100 TABLET ORAL at 14:55

## 2018-08-01 RX ADMIN — COLCHICINE 0.6 MG: 0.6 TABLET, FILM COATED ORAL at 16:57

## 2018-08-01 RX ADMIN — LEVOTHYROXINE SODIUM 75 MCG: 75 TABLET ORAL at 14:55

## 2018-08-01 NOTE — BSMART NOTE
Bed search:    9:10 pm: [facility] does not currently have any acute beds available. Bed search will start. 10:07 pm: [facility] reports they are willing to review information on patient. Information faxed to [facility].    10:14 pm: [facility] reports they may have availability and paperwork will be faxed over for review.     Roseann Angulo LPC [facility]

## 2018-08-01 NOTE — BSMART NOTE
Patient reviewed and accepted by Dr Saint Canning at Bed 729-01 at [facility]. Relayed information to StartForcerenetta Santoro RN.

## 2018-08-01 NOTE — ED NOTES
Spoke with Breana Lovett, she is going to attempt placement for patient at [place] [id number] after calling the physician to clarify why an acute bed was ordered. Patient is not showing signs of agitation or aggression. He has been compliant and cordial when interacting with this nurse. When asked if he would go home and hurt himself, patient responded \"I can't go home like this, it has been going on for 2 years and I can't go home like this. \"

## 2018-08-01 NOTE — ED NOTES
Bedside and Verbal shift change report given to Todd Grubbs RN (oncoming nurse) by Stefano Olivares RN (offgoing nurse). Report included the following information SBAR, Kardex, ED Summary, MAR, Accordion and Recent Results. Patient resting quietly at this time. Sitter at bedside.

## 2018-08-01 NOTE — IP AVS SNAPSHOT
1111 [facility] [address] [place] [address] 
[phone] Patient: Gladis Cole MRN: [id number] :1965 About your hospitalization You were admitted on:  2018 You last received care in the:  [address] [place] [address] You were discharged on:  August 3, 2018 Why you were hospitalized Your primary diagnosis was: Major Depressive Disorder With Current Active Episode Your diagnoses also included:  Depressive Disorder Follow-up Information Follow up With Details Comments Contact Info Fly James MD   [address] 103 [facility] [address] 
[phone] [facility] on 2018 Walk-in Monday through Thursday starting at 7:00am to enroll in mental health and substance abuse follow-up services. [address] 
[phone] Your Scheduled Appointments 2018  1:00 PM EDT  
CONSULT with Jitendra Urbano MD  
[facility] [address] 200 200 [facility]  
[phone] Discharge Orders None A check valentine indicates which time of day the medication should be taken. My Medications START taking these medications Instructions Each Dose to Equal  
 Morning Noon Evening Bedtime  
 amitriptyline 25 mg tablet Commonly known as:  ELAVIL Your next dose is:  bedtime Take 1 Tab by mouth nightly. Indications: depression 25 mg DULoxetine 30 mg capsule Commonly known as:  CYMBALTA Your next dose is:  9am and 9pm  
   
 Take 1 Cap by mouth two (2) times a day. Indications: ANXIETY WITH DEPRESSION 30 mg  
    
  
   
   
  
   
  
 gabapentin 100 mg capsule Commonly known as:  NEURONTIN Your next dose is:  9am, 5pm and 10pm  
   
 Take 2 Caps by mouth three (3) times daily. Indications: NEUROPATHIC PAIN  
 200 mg CONTINUE taking these medications Instructions Each Dose to Equal  
 Morning Noon Evening Bedtime  
 allopurinol 100 mg tablet Commonly known as:  Ventura Valle Your next dose is:  9am  
   
 Take 1 Tab by mouth daily. 100 mg  
    
  
   
   
   
  
 aspirin delayed-release 81 mg tablet Your next dose is:  9am  
   
 Take 81 mg by mouth daily. 81 mg  
    
  
   
   
   
  
 colchicine 0.6 mg tablet Commonly known as:  COLCRYS Take 1 Tab by mouth daily as needed. For gout flare 0.6 mg  
    
   
   
   
  
 ketoconazole 2 % topical cream  
Commonly known as:  NIZORAL Your next dose is:  9am  
   
 Apply  to affected area daily. levothyroxine 75 mcg tablet Commonly known as:  SYNTHROID Your next dose is:  Before breakfast  
   
 Take 1 Tab by mouth Daily (before breakfast). 75 mcg  
    
  
   
   
   
  
 lidocaine-prilocaine topical cream  
Commonly known as:  EMLA Apply to affected area as directed  
     
   
   
   
  
 lisinopril 20 mg tablet Commonly known as:  Janice Elsa Your next dose is:  9am  
   
 Take 20 mg by mouth daily. Indications: hypertension 20 mg  
    
  
   
   
   
  
 methadone 10 mg/mL solution Commonly known as:  DOLOPHINE Your next dose is:  9am  
   
 Take 115 mg by mouth daily. Indications: Opioid Dependence 115 mg  
    
  
   
   
   
  
 tamsulosin 0.4 mg capsule Commonly known as:  FLOMAX Your next dose is:  9am  
   
 Take 0.4 mg by mouth daily. Indications: bladder cancer 0.4 mg Where to Get Your Medications Information on where to get these meds will be given to you by the nurse or doctor. ! Ask your nurse or doctor about these medications  
  amitriptyline 25 mg tablet DULoxetine 30 mg capsule  
 gabapentin 100 mg capsule Opioid Education Prescription Opioids: What You Need to Know: 
 
Prescription opioids can be used to help relieve moderate-to-severe pain and are often prescribed following a surgery or injury, or for certain health conditions. These medications can be an important part of treatment but also come with serious risks. Opioids are strong pain medicines. Examples include hydrocodone, oxycodone, fentanyl, and morphine. Heroin is an example of an illegal opioid. It is important to work with your health care provider to make sure you are getting the safest, most effective care. WHAT ARE THE RISKS AND SIDE EFFECTS OF OPIOID USE? Prescription opioids carry serious risks of addiction and overdose, especially with prolonged use. An opioid overdose, often marked by slow breathing, can cause sudden death. The use of prescription opioids can have a number of side effects as well, even when taken as directed. · Tolerance-meaning you might need to take more of a medication for the same pain relief · Physical dependence-meaning you have symptoms of withdrawal when the medication is stopped. Withdrawal symptoms can include nausea, sweating, chills, diarrhea, stomach cramps, and muscle aches. Withdrawal can last up to several weeks, depending on which drug you took and how long you took it. · Increased sensitivity to pain · Constipation · Nausea, vomiting, and dry mouth · Sleepiness and dizziness · Confusion · Depression · Low levels of testosterone that can result in lower sex drive, energy, and strength · Itching and sweating RISKS ARE GREATER WITH:      
· History of drug misuse, substance use disorder, or overdose · Mental health conditions (such as depression or anxiety) · Sleep apnea · Older age (72 years or older) · Pregnancy Avoid alcohol while taking prescription opioids. Also, unless specifically advised by your health care provider, medications to avoid include: · Benzodiazepines (such as Xanax or Valium) · Muscle relaxants (such as Soma or Flexeril) · Hypnotics (such as Ambien or Lunesta) · Other prescription opioids KNOW YOUR OPTIONS Talk to your health care provider about ways to manage your pain that don't involve prescription opioids. Some of these options may actually work better and have fewer risks and side effects. Options may include: 
· Pain relievers such as acetaminophen, ibuprofen, and naproxen · Some medications that are also used for depression or seizures · Physical therapy and exercise · Counseling to help patients learn how to cope better with triggers of pain and stress. · Application of heat or cold compress · Massage therapy · Relaxation techniques Be Informed Make sure you know the name of your medication, how much and how often to take it, and its potential risks & side effects. IF YOU ARE PRESCRIBED OPIOIDS FOR PAIN: 
· Never take opioids in greater amounts or more often than prescribed. Remember the goal is not to be pain-free but to manage your pain at a tolerable level. · Follow up with your primary care provider to: · Work together to create a plan on how to manage your pain. · Talk about ways to help manage your pain that don't involve prescription opioids. · Talk about any and all concerns and side effects. · Help prevent misuse and abuse. · Never sell or share prescription opioids · Help prevent misuse and abuse. · Store prescription opioids in a secure place and out of reach of others (this may include visitors, children, friends, and family). · Safely dispose of unused/unwanted prescription opioids: Find your community drug take-back program or your pharmacy mail-back program, or flush them down the toilet, following guidance from the Food and Drug Administration (www.fda.gov/Drugs/ResourcesForYou). · Visit www.cdc.gov/drugoverdose to learn about the risks of opioid abuse and overdose. · If you believe you may be struggling with addiction, tell your health care provider and ask for guidance or call [address] at 6-479-176-LDBC. Discharge Instructions DISCHARGE SUMMARY:  Substance Abuse Cecile Dawson : 1965 MRN: [id number] The patient Silvia Valadez exhibits the ability to function in a less restrictive environment. There has been no evidence of withdrawal for the past 24 hours. No suicidal/homicidal threat or behavior has been observed for the past 24 hours. If weapons involved, how are they secured? No weapons involved. Is patient aware of and in agreement with discharge plan? Patient is being administratively discharged due to reaching maximum benefit on the unit. Arrangements for medication:  Prescriptions given to patient. Referral for substance treatment? Patient refused. Referral for smoking cessation needed? Patient is not a smoker/Not applicable. Copy of discharge instructions to provider?:  [employer] Arrangements for transportation home:  Patient to arrange his own transportation. Mental health crisis number:  314 or your local mental health crisis line number at 307-662-9289 [facility]) or [phone] [facility]). DISCHARGE SUMMARY from Nurse PATIENT INSTRUCTIONS: 
 
What to do at Home: 
Recommended activity: Activity as tolerated, If you experience any of the following symptoms thoughts of harming self, feeling overwhelmed with anxiety, hopelessness, please follow up with your assigned providers and local crisis nubmer. *  Please give a list of your current medications to your Primary Care Provider. *  Please update this list whenever your medications are discontinued, doses are 
    changed, or new medications (including over-the-counter products) are added. *  Please carry medication information at all times in case of emergency situations. These are general instructions for a healthy lifestyle: No smoking/ No tobacco products/ Avoid exposure to second hand smoke Surgeon General's Warning:  Quitting smoking now greatly reduces serious risk to your health. Obesity, smoking, and sedentary lifestyle greatly increases your risk for illness A healthy diet, regular physical exercise & weight monitoring are important for maintaining a healthy lifestyle You may be retaining fluid if you have a history of heart failure or if you experience any of the following symptoms:  Weight gain of 3 pounds or more overnight or 5 pounds in a week, increased swelling in our hands or feet or shortness of breath while lying flat in bed. Please call your doctor as soon as you notice any of these symptoms; do not wait until your next office visit. Recognize signs and symptoms of STROKE: 
 
F-face looks uneven A-arms unable to move or move unevenly S-speech slurred or non-existent T-time-call 911 as soon as signs and symptoms begin-DO NOT go Back to bed or wait to see if you get better-TIME IS BRAIN. Warning Signs of HEART ATTACK Call 911 if you have these symptoms: 
? Chest discomfort. Most heart attacks involve discomfort in the center of the chest that lasts more than a few minutes, or that goes away and comes back. It can feel like uncomfortable pressure, squeezing, fullness, or pain. ? Discomfort in other areas of the upper body. Symptoms can include pain or discomfort in one or both arms, the back, neck, jaw, or stomach. ? Shortness of breath with or without chest discomfort. ? Other signs may include breaking out in a cold sweat, nausea, or lightheadedness. Don't wait more than five minutes to call [address]! Fast action can save your life.  Calling 911 is almost always the fastest way to get lifesaving treatment. Emergency Medical Services staff can begin treatment when they arrive  up to an hour sooner than if someone gets to the hospital by car. The discharge information has been reviewed with the patient. The patient verbalized understanding. Discharge medications reviewed with the patient and appropriate educational materials and side effects teaching were provided. ___________________________________________________________________________________________________________________________________ Introducing [facility]! Dear [place]: Thank you for requesting a "[employer]" account. Our records indicate that you already have an active "[employer]" account. You can access your account anytime at [email or web address] Did you know that you can access your hospital and ER discharge instructions at any time in "[employer]"? You can also review all of your test results from your hospital stay or ER visit. Additional Information If you have questions, please visit the Frequently Asked Questions section of the "[employer]" website at [email or web address] Remember, "[employer]" is NOT to be used for urgent needs. For medical emergencies, dial 911. Now available from your iPhone and Android! Introducing Zachery Dukes As a [facility] patient, I wanted to make you aware of our electronic visit tool called [employer]acher[employer]ukes. [facility] 24/7 allows you to connect within minutes with a medical provider 24 hours a day, seven days a week via a mobile device or tablet or logging into a secure website from your computer. You can access Zachery Dukes from anywhere in the [place].  
 
A virtual visit might be right for you when you have a simple condition and feel like you just dont want to get out of bed, or cant get away from work for an appointment, when your regular [facility] provider is not available (evenings, weekends or holidays), or when youre out of town and need minor care. Electronic visits cost only $49 and if the [employer] 24/7 provider determines a prescription is needed to treat your condition, one can be electronically transmitted to a nearby pharmacy*. Please take a moment to enroll today if you have not already done so. The enrollment process is free and takes just a few minutes. To enroll, please download the [employer] 24/7 katia to your tablet or phone, or visit [email or web address] to enroll on your computer. And, as an [address] [place] [address] patient with a [email or web address] account, the results of your visits will be scanned into your electronic medical record and your primary care provider will be able to view the scanned results. We urge you to continue to see your regular [employer] provider for your ongoing medical care. And while your primary care provider may not be the one available when you seek a [employer] virtual visit, the peace of mind you get from getting a real diagnosis real time can be priceless. For more information on [employer], view our Frequently Asked Questions (FAQs) at [email or web address]. Sincerely, 
 
Monika Jarvis MD 
Chief Medical Officer [employer] *:  certain medications cannot be prescribed via [employer] Providers Seen During Your Hospitalization Provider Specialty Primary office phone Kasey Vines MD Psychiatry [phone] Sylvia Shelley MD Psychiatry [phone] Sergio Rice MD Psychiatry [phone] Your Primary Care Physician (PCP) Primary Care Physician Office Phone Office Fax Ritu Howard [phone] You are allergic to the following Allergen Reactions Sulfamethoxazole-Trimethoprim Rash L eye and L hand Ciprofloxacin Hives Per pcp records Codeine Hives Tolerates dilaudid, oxycodone Lyrica (Pregabalin) Hives Sulfa (Sulfonamide Antibiotics) Rash Ultram (Tramadol) Hives Recent Documentation Smoking Status Current Every Day Smoker Emergency Contacts Name Discharge Info Relation Home Work Mobile Reina Henry DISCHARGE CAREGIVER [3] Other Relative [6] [phone] Dallin Valero DISCHARGE CAREGIVER [3] Other Relative [6] [phone] [address]ar CAREGIVER [3] Other Relative [6] [phone] Patient Belongings The following personal items are in your possession at time of discharge: 
  Dental Appliances: None  Visual Aid: None      Home Medications: Locked (Given to nurse)   Jewelry: None  Clothing: Belt, [place], [place], Shorts, Socks, Undergarments (1 sock,boot,sneaker,shorts,shrt(pt)belt,hat,toiletrybag(clst)    Other Valuables: Cane, [employer], Cigarettes, Eyeglasses, Keys, Lighter/matches, Money (comment), Personal toiletries, Wallet, Other (comment)  Personal Items Sent to Safe: cc,12sharps (cc,12shrps) Please provide this summary of care documentation to your next provider. Signatures-by signing, you are acknowledging that this After Visit Summary has been reviewed with you and you have received a copy. Patient Signature:  ____________________________________________________________ Date:  ____________________________________________________________  
  
Conchita Barrera Provider Signature:  ____________________________________________________________ Date:  ____________________________________________________________

## 2018-08-01 NOTE — ED NOTES
TRANSFER - OUT REPORT:    Verbal report given to Juliann Chery RN (name) on Alek Álvarez  being transferred to [place] [address] Rm 729-01 (unit) for routine progression of care       Report consisted of patients Situation, Background, Assessment and   Recommendations(SBAR). Information from the following report(s) SBAR, Kardex, ED Summary, MAR, Accordion and Recent Results was reviewed with the receiving nurse. Lines:       Opportunity for questions and clarification was provided.       Patient transported with:  [facility] transport

## 2018-08-01 NOTE — IP AVS SNAPSHOT
[address] 
[phone] Patient: Eugenie Tobar MRN: [id number] :1965 A check valentine indicates which time of day the medication should be taken. My Medications START taking these medications Instructions Each Dose to Equal  
 Morning Noon Evening Bedtime  
 amitriptyline 25 mg tablet Commonly known as:  ELAVIL Your next dose is:  bedtime Take 1 Tab by mouth nightly. Indications: depression 25 mg DULoxetine 30 mg capsule Commonly known as:  CYMBALTA Your next dose is:  9am and 9pm  
   
 Take 1 Cap by mouth two (2) times a day. Indications: ANXIETY WITH DEPRESSION 30 mg  
    
  
   
   
  
   
  
 gabapentin 100 mg capsule Commonly known as:  NEURONTIN Your next dose is:  9am, 5pm and 10pm  
   
 Take 2 Caps by mouth three (3) times daily. Indications: NEUROPATHIC PAIN  
 200 mg CONTINUE taking these medications Instructions Each Dose to Equal  
 Morning Noon Evening Bedtime  
 allopurinol 100 mg tablet Commonly known as:  Ollen Epley Your next dose is:  9am  
   
 Take 1 Tab by mouth daily. 100 mg  
    
  
   
   
   
  
 aspirin delayed-release 81 mg tablet Your next dose is:  9am  
   
 Take 81 mg by mouth daily. 81 mg  
    
  
   
   
   
  
 colchicine 0.6 mg tablet Commonly known as:  COLCRYS Take 1 Tab by mouth daily as needed. For gout flare 0.6 mg  
    
   
   
   
  
 ketoconazole 2 % topical cream  
Commonly known as:  NIZORAL Your next dose is:  9am  
   
 Apply  to affected area daily. levothyroxine 75 mcg tablet Commonly known as:  SYNTHROID Your next dose is:  Before breakfast  
   
 Take 1 Tab by mouth Daily (before breakfast). 75 mcg  
    
  
   
   
   
  
 lidocaine-prilocaine topical cream  
Commonly known as:  EMLA Apply to affected area as directed  
     
   
   
   
  
 lisinopril 20 mg tablet Commonly known as:  Cisco Kunz Your next dose is:  9am  
   
 Take 20 mg by mouth daily. Indications: hypertension 20 mg  
    
  
   
   
   
  
 methadone 10 mg/mL solution Commonly known as:  DOLOPHINE Your next dose is:  9am  
   
 Take 115 mg by mouth daily. Indications: Opioid Dependence 115 mg  
    
  
   
   
   
  
 tamsulosin 0.4 mg capsule Commonly known as:  FLOMAX Your next dose is:  9am  
   
 Take 0.4 mg by mouth daily. Indications: bladder cancer 0.4 mg Where to Get Your Medications Information on where to get these meds will be given to you by the nurse or doctor. ! Ask your nurse or doctor about these medications  
  amitriptyline 25 mg tablet DULoxetine 30 mg capsule  
 gabapentin 100 mg capsule

## 2018-08-01 NOTE — PROGRESS NOTES
Problem: Altered Thought Process (Adult/Pediatric)  Goal: *STG: Participates in treatment plan  Outcome: Not Progressing Towards Goal  Variance: Patient Uncooperative    Pt is angry, demanding a wheelchair, refusing to use his  cane, even though that is what he uses outside of the hospital setting. BSC obtained. Pt refuses to use it, stating that he will crawl on the floor to the bathroom. Order obtained to PT consult.

## 2018-08-01 NOTE — BH NOTES
GROUP THERAPY PROGRESS NOTE    Silviaanitha Valadez did not participate in a 65 minute Process Group on the General Unit with a focus identifying feelings, planning for the day, and learning more about DBT concepts of \"Interpersonal Effectiveness and using the [address] as a long-term personal treatment plan. He was still going through the admission process at the time this group met.

## 2018-08-01 NOTE — H&P
INITIAL PSYCHIATRIC EVALUATION            IDENTIFICATION:    Patient Name  Alek Álvarez   Date of Birth 1965   [facility] [id number]   Medical Record Number  [id number]      Age  47 y.o. PCP Dago Rebolledo MD   Admit date:  8/1/2018    Room Number  729/01  @ [facility]   Date of Service  8/1/2018            HISTORY         REASON FOR HOSPITALIZATION:  CC: \"depression/ SI/homelessness \". Pt admitted under a voluntary basis for severe depression with suicidal ideations  proving to be an imminent danger to self and others and an inability to care for self. HISTORY OF PRESENT ILLNESS:    The patient, Alek Álvarez, is a 47 y.o. WHITE [place]  male with a past psychiatric history significant for opiate dependence and chronic pain , who presents at this time with complaints of (and/or evidence of) the following emotional symptoms: depression. Additional symptomatology include anxiety. The above symptoms have been present for years . These symptoms are of severe severity. These symptoms are constant  in nature. The patient's condition has been precipitated by homelessness and psychosocial stressors (bladder CA ). Patient's condition made worse by treatment noncompliance. UDS: + methadone ; BAL=0. ALLERGIES:   Allergies   Allergen Reactions    Sulfamethoxazole-Trimethoprim Rash     L eye and L hand    Ciprofloxacin Hives     Per pcp records    Codeine Hives     Tolerates dilaudid, oxycodone    Lyrica [Pregabalin] Hives    Sulfa (Sulfonamide Antibiotics) Rash    Ultram [Tramadol] Hives      MEDICATIONS PRIOR TO ADMISSION:   Prescriptions Prior to Admission   Medication Sig    methadone (DOLOPHINE) 10 mg/mL solution Take 115 mg by mouth daily. Indications: Opioid Dependence    escitalopram oxalate (LEXAPRO) 5 mg tablet Take 1 Tab by mouth daily. Indications: Generalized Anxiety Disorder, major depressive disorder    lisinopril (PRINIVIL, ZESTRIL) 20 mg tablet Take 20 mg by mouth daily. Indications: hypertension    ammonium lactate (AMLACTIN) 12 % topical cream Apply  to affected area two (2) times a day. rub in to affected area well   Indications: DRY SKIN    tamsulosin (FLOMAX) 0.4 mg capsule Take 0.4 mg by mouth daily. Indications: bladder cancer    triamcinolone acetonide (KENALOG) 0.1 % topical cream Apply  to affected area. use thin layer    aspirin delayed-release 81 mg tablet Take 81 mg by mouth daily.  colchicine (COLCRYS) 0.6 mg tablet Take 1 Tab by mouth daily as needed. For gout flare    allopurinol (ZYLOPRIM) 100 mg tablet Take 1 Tab by mouth daily.  levothyroxine (SYNTHROID) 75 mcg tablet Take 1 Tab by mouth Daily (before breakfast). PAST MEDICAL HISTORY:   Past Medical History:   Diagnosis Date    Aneurysm [facility])     (with stroke)    Bladder tumor     Family history of bladder cancer     Gout     Hypercholesterolemia     Hypertension     Lesion of bladder     [facility]     Stroke ([facility] 2006    left sided weakness    Thromboembolus ([facility]     Thyroid disease     TIA (transient ischemic attack) 2012     Past Surgical History:   Procedure Laterality Date    HX UROLOGICAL  04/20/2018    Cystoscopy, TURBT (greater than 5 cm resected) Dr. Arnold Clay, [facility].  KNEE ARTHROSCP HARV      left knee      SOCIAL HISTORY:    Social History     Social History    Marital status:      Spouse name: N/A    Number of children: N/A    Years of education: N/A     Occupational History    Not on file. Social History Main Topics    Smoking status: Current Every Day Smoker     Packs/day: 1.00    Smokeless tobacco: Never Used    Alcohol use 8.4 oz/week     14 Cans of beer per week    Drug use: Yes     Special: Prescription    Sexual activity: Yes     Other Topics Concern    Not on file     Social History Narrative    60year old  male admitted for reported SI in the context of bladder CA, chronic pain, homelessness, and opiod dependence.  Pt has been in multiple psychiatric admissions for the same compliants in the last 2 years,      FAMILY HISTORY:    Family History   Problem Relation Age of Onset    Diabetes Father     Diabetes Sister     Diabetes Brother     Cancer Paternal Grandfather      Bladder       REVIEW OF SYSTEMS:   Psychological ROS: positive for - behavioral disorder  Cardiovascular ROS: no chest pain or dyspnea on exertion  Gastrointestinal ROS: no abdominal pain, change in bowel habits, or black or bloody stools  Pertinent items are noted in the History of Present Illness. All other Systems reviewed and are considered negative. MENTAL STATUS EXAM & VITALS     MENTAL STATUS EXAM (MSE):    MSE FINDINGS ARE WITHIN NORMAL LIMITS (WNL) UNLESS OTHERWISE STATED BELOW. ( ALL OF THE BELOW CATEGORIES OF THE MSE HAVE BEEN REVIEWED (reviewed 8/1/2018) AND UPDATED AS DEEMED APPROPRIATE )  General Presentation age appropriate and older than stated age, evasive and guarded   Orientation oriented to time, place and person   Vital Signs  See below (reviewed 8/1/2018); Vital Signs (BP, Pulse, & Temp) are within normal limits if not listed below. Gait and Station Stable/steady, no ataxia   Musculoskeletal System No extrapyramidal symptoms (EPS); no abnormal muscular movements or Tardive Dyskinesia (TD); muscle strength and tone are within normal limits   Language No aphasia or dysarthria   Speech:  monotone   Thought Processes logical; normal rate of thoughts; poor abstract reasoning/computation   Thought Associations goal directed   Thought Content free of delusions   Suicidal Ideations contracts for safety   Homicidal Ideations none   Mood:  irritable   Affect:  mood-congruent   Memory recent  fair   Memory remote:  fair   Concentration/Attention:  distractable   Fund of Knowledge below average   Insight:  poor   Reliability poor   Judgment:  poor          VITALS:     No data found.     Wt Readings from Last 3 Encounters:   07/31/18 104.3 kg (230 lb)   06/05/18 102.3 kg (225 lb 8.5 oz)   06/04/18 104.3 kg (230 lb)     Temp Readings from Last 3 Encounters:   08/01/18 98.1 °F (36.7 °C)   06/08/18 98.5 °F (36.9 °C)   06/06/18 97.8 °F (36.6 °C)     BP Readings from Last 3 Encounters:   08/01/18 (!) 158/95   06/08/18 137/89   06/06/18 121/67     Pulse Readings from Last 3 Encounters:   08/01/18 69   06/08/18 (!) 55   06/06/18 (!) 52            DATA     LABORATORY DATA:  Labs Reviewed - No data to display  Admission on 07/31/2018, Discharged on 08/01/2018   Component Date Value Ref Range Status    WBC 07/31/2018 9.4  4.1 - 11.1 K/uL Final    RBC 07/31/2018 5.19  4. 10 - 5.70 M/uL Final    HGB 07/31/2018 14.3  12.1 - 17.0 g/dL Final    HCT 07/31/2018 44.1  36.6 - 50.3 % Final    MCV 07/31/2018 85.0  80.0 - 99.0 FL Final    MCH 07/31/2018 27.6  26.0 - 34.0 PG Final    MCHC 07/31/2018 32.4  30.0 - 36.5 g/dL Final    RDW 07/31/2018 15.7* 11.5 - 14.5 % Final    PLATELET 63/06/4992 135  150 - 400 K/uL Final    MPV 07/31/2018 10.1  8.9 - 12.9 FL Final    NRBC 07/31/2018 0.0  0  WBC Final    ABSOLUTE NRBC 07/31/2018 0.00  0.00 - 0.01 K/uL Final    NEUTROPHILS 07/31/2018 65  32 - 75 % Final    LYMPHOCYTES 07/31/2018 27  12 - 49 % Final    MONOCYTES 07/31/2018 5  5 - 13 % Final    EOSINOPHILS 07/31/2018 2  0 - 7 % Final    BASOPHILS 07/31/2018 1  0 - 1 % Final    IMMATURE GRANULOCYTES 07/31/2018 1* 0.0 - 0.5 % Final    ABS. NEUTROPHILS 07/31/2018 6.1  1.8 - 8.0 K/UL Final    ABS. LYMPHOCYTES 07/31/2018 2.5  0.8 - 3.5 K/UL Final    ABS. MONOCYTES 07/31/2018 0.5  0.0 - 1.0 K/UL Final    ABS. EOSINOPHILS 07/31/2018 0.2  0.0 - 0.4 K/UL Final    ABS. BASOPHILS 07/31/2018 0.1  0.0 - 0.1 K/UL Final    ABS. IMM.  GRANS. 07/31/2018 0.1* 0.00 - 0.04 K/UL Final    DF 07/31/2018 AUTOMATED    Final    Sodium 07/31/2018 136  136 - 145 mmol/L Final    Potassium 07/31/2018 3.9  3.5 - 5.1 mmol/L Final    Chloride 07/31/2018 100  97 - 108 mmol/L Final    CO2 07/31/2018 29  21 - 32 mmol/L Final    Anion gap 07/31/2018 7  5 - 15 mmol/L Final    Glucose 07/31/2018 77  65 - 100 mg/dL Final    BUN 07/31/2018 4* 6 - 20 MG/DL Final    Creatinine 07/31/2018 0.78  0.70 - 1.30 MG/DL Final    BUN/Creatinine ratio 07/31/2018 5* 12 - 20   Final    GFR est AA 07/31/2018 >60  >60 ml/min/1.73m2 Final    GFR est non-AA 07/31/2018 >60  >60 ml/min/1.73m2 Final    Calcium 07/31/2018 8.7  8.5 - 10.1 MG/DL Final    Bilirubin, total 07/31/2018 0.4  0.2 - 1.0 MG/DL Final    ALT (SGPT) 07/31/2018 30  12 - 78 U/L Final    AST (SGOT) 07/31/2018 28  15 - 37 U/L Final    Alk.  phosphatase 07/31/2018 157* 45 - 117 U/L Final    Protein, total 07/31/2018 7.8  6.4 - 8.2 g/dL Final    Albumin 07/31/2018 4.3  3.5 - 5.0 g/dL Final    Globulin 07/31/2018 3.5  2.0 - 4.0 g/dL Final    A-G Ratio 07/31/2018 1.2  1.1 - 2.2   Final    Acetaminophen level 07/31/2018 2* 10 - 30 ug/mL Final    Salicylate level 41/98/0928 4.1  2.8 - 20.0 MG/DL Final    Color 07/31/2018 YELLOW/STRAW    Final    Appearance 07/31/2018 CLEAR  CLEAR   Final    Specific gravity 07/31/2018 1.006  1.003 - 1.030   Final    pH (UA) 07/31/2018 6.0  5.0 - 8.0   Final    Protein 07/31/2018 NEGATIVE   NEG mg/dL Final    Glucose 07/31/2018 NEGATIVE   NEG mg/dL Final    Ketone 07/31/2018 NEGATIVE   NEG mg/dL Final    Bilirubin 07/31/2018 NEGATIVE   NEG   Final    Blood 07/31/2018 NEGATIVE   NEG   Final    Urobilinogen 07/31/2018 0.2  0.2 - 1.0 EU/dL Final    Nitrites 07/31/2018 NEGATIVE   NEG   Final    Leukocyte Esterase 07/31/2018 NEGATIVE   NEG   Final    WBC 07/31/2018 0-4  0 - 4 /hpf Final    RBC 07/31/2018 0-5  0 - 5 /hpf Final    Epithelial cells 07/31/2018 FEW  FEW /lpf Final    Bacteria 07/31/2018 NEGATIVE   NEG /hpf Final    UA:UC IF INDICATED 07/31/2018 CULTURE NOT INDICATED BY UA RESULT  CNI   Final    Hyaline cast 07/31/2018 0-2  0 - 5 /lpf Final    ALCOHOL(ETHYL),SERUM 07/31/2018 74* <10 MG/DL Final  Lipase 07/31/2018 48* 73 - 393 U/L Final    AMPHETAMINES 07/31/2018 NEGATIVE   NEG   Final    BARBITURATES 07/31/2018 NEGATIVE   NEG   Final    BENZODIAZEPINES 07/31/2018 NEGATIVE   NEG   Final    COCAINE 07/31/2018 NEGATIVE   NEG   Final    METHADONE 07/31/2018 POSITIVE* NEG   Final    OPIATES 07/31/2018 NEGATIVE   NEG   Final    PCP(PHENCYCLIDINE) 07/31/2018 NEGATIVE   NEG   Final    THC (TH-CANNABINOL) 07/31/2018 NEGATIVE   NEG   Final    Drug screen comment 07/31/2018 (NOTE)   Final        RADIOLOGY REPORTS:    Results from Hospital Encounter encounter on 03/13/18   XR FOOT LT MIN 3 V   Narrative EXAM:  XR FOOT LT MIN 3 V    INDICATION:   left foot pain and swelling; wound to foot x 1 year with poor  podiatry follow-up. .    COMPARISON:  None. FINDINGS:  Three views of the left foot. There is diffuse osteopenia. There is  more focal osteopenia at the medial base of the first proximal phalanx. Impression IMPRESSION:  Focal osteopenia at the medial base of the first proximal phalanx. This can be seen with osteomyelitis. No results found.            MEDICATIONS       ALL MEDICATIONS  Current Facility-Administered Medications   Medication Dose Route Frequency    ziprasidone (GEODON) 20 mg in sterile water (preservative free) 1 mL injection  20 mg IntraMUSCular BID PRN    OLANZapine (ZyPREXA) tablet 5 mg  5 mg Oral Q6H PRN    benztropine (COGENTIN) tablet 2 mg  2 mg Oral BID PRN    benztropine (COGENTIN) injection 2 mg  2 mg IntraMUSCular BID PRN    LORazepam (ATIVAN) injection 2 mg  2 mg IntraMUSCular Q4H PRN    LORazepam (ATIVAN) tablet 1 mg  1 mg Oral Q4H PRN    zolpidem (AMBIEN) tablet 10 mg  10 mg Oral QHS PRN    acetaminophen (TYLENOL) tablet 650 mg  650 mg Oral Q4H PRN    ibuprofen (MOTRIN) tablet 400 mg  400 mg Oral Q8H PRN    magnesium hydroxide (MILK OF MAGNESIA) 400 mg/5 mL oral suspension 30 mL  30 mL Oral DAILY PRN    nicotine (NICODERM CQ) 21 mg/24 hr patch 1 Patch  1 Patch TransDERmal DAILY PRN    methadone (DOLOPHINE) 5 mg/5 mL oral solution 115 mg  115 mg Oral DAILY    [START ON 8/2/2018] escitalopram oxalate (LEXAPRO) tablet 5 mg  5 mg Oral DAILY    [START ON 8/2/2018] tamsulosin (FLOMAX) capsule 0.4 mg  0.4 mg Oral DAILY    [START ON 8/2/2018] lisinopril (PRINIVIL, ZESTRIL) tablet 20 mg  20 mg Oral DAILY    [START ON 8/2/2018] allopurinol (ZYLOPRIM) tablet 100 mg  100 mg Oral DAILY    colchicine tablet 0.6 mg  0.6 mg Oral ONCE    [START ON 8/2/2018] levothyroxine (SYNTHROID) tablet 75 mcg  75 mcg Oral ACB      SCHEDULED MEDICATIONS  Current Facility-Administered Medications   Medication Dose Route Frequency    methadone (DOLOPHINE) 5 mg/5 mL oral solution 115 mg  115 mg Oral DAILY    [START ON 8/2/2018] escitalopram oxalate (LEXAPRO) tablet 5 mg  5 mg Oral DAILY    [START ON 8/2/2018] tamsulosin (FLOMAX) capsule 0.4 mg  0.4 mg Oral DAILY    [START ON 8/2/2018] lisinopril (PRINIVIL, ZESTRIL) tablet 20 mg  20 mg Oral DAILY    [START ON 8/2/2018] allopurinol (ZYLOPRIM) tablet 100 mg  100 mg Oral DAILY    colchicine tablet 0.6 mg  0.6 mg Oral ONCE    [START ON 8/2/2018] levothyroxine (SYNTHROID) tablet 75 mcg  75 mcg Oral ACB                ASSESSMENT & PLAN        The patient, Eugenie Tobar, is a 47 y.o.  male who presents at this time for treatment of the following diagnoses:  Patient Active Hospital Problem List:   Major depressive disorder with current active episode (12/27/2017)    Assessment: sadness, hopelessness, helplessness- possibly due to opiate dependence    Plan: antidepressant                 A coordinated, multidisplinary treatment team (includes the nurse, unit pharmcist,  and writer) round was conducted for this initial evaluation with the patient present. The following regarding medications was addressed during rounds with patient: antidepressant   the risks and benefits of the proposed medication.  The patient was given the opportunity to ask questions. Informed consent given to the use of the above medications. I will continue to adjust psychiatric and non-psychiatric medications (see above \"medication\" section and orders section for details) as deemed appropriate & based upon diagnoses and response to treatment. I have reviewed admission (and previous/old) labs and medical tests in the EHR and or transferring hospital documents. I will continue to order blood tests/labs and diagnostic tests as deemed appropriate and review results as they become available (see orders for details). I have reviewed old psychiatric and medical records available in the EHR. I Will order additional psychiatric records from other institutions to further elucidate the nature of patient's psychopathology and review once available. I will gather additional collateral information from friends, family and o/p treatment team to further elucidate the nature of patient's psychopathology and baselline level of psychiatric functioning.       ESTIMATED LENGTH OF STAY:    3-5 days        STRENGTHS:  Exercising self-direction/Resourceful and Knowledge of medications                                        SIGNED:    Vince Cortés MD  8/1/2018

## 2018-08-01 NOTE — ED NOTES
[facility] transport set up for patient, Ride# [id number], ETA [id number].   Healthkeepers authorization received [id number] and given to [facility]

## 2018-08-01 NOTE — BH NOTES
PSYCHOSOCIAL ASSESSMENT  :Patient identifying info:  Duong Carter is a 47 y.o., male admitted 8/1/2018 11:27 AM     Presenting problem and precipitating factors: Patient came to ED [facility] ED c/o chronic pain and foot wound infection. Pt endorsed depression and stated he would \"blow my brains out\" if discharged from the hospital.  Pt reported he was recently diagnosed with bladder cancer and has had complications sine his last surgery. Pt stated he believes the cancer has spread to the muscle and fears he will need to have his bladder removed. Pt states he is \"tired of living like this. \"  Pt reports no supports or family. Mental status assessment:  Calm, cooperative    Current psychiatric providers and contact info: Dr. Laina Mahan (PCP)    Previous psychiatric services/providers and response to treatment: Multiple previous inpatient psychiatric hospitalizations    Family history of mental illness:  Depression, alcoholism    Substance abuse history:  ETOH (history of); prescribed opiates and methadone  Social History   Substance Use Topics    Smoking status: Current Every Day Smoker     Packs/day: 1.00    Smokeless tobacco: Never Used    Alcohol use 8.4 oz/week     14 Cans of beer per week       Family constellation: 1 daughter (no contact)    Is significant other involved? No,       Describe support system: None    Describe living arrangements and home environment: Staying with a friend.   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

## 2018-08-01 NOTE — BH NOTES
PSYCHIATRIC PROGRESS NOTE         Patient Name  Duong Carter   Date of Birth 1965   [facility] [id number]   Medical Record Number  [id number]      Age  47 y.o. PCP Keith Delgado MD   Admit date:  8/1/2018    Room Number  729/01  @ [address]   Date of Service  8/1/2018          PSYCHOTHERAPY SESSION NOTE:  Length of psychotherapy session: 45 minutes    Main condition/diagnosis/issues treated during session today, 8/1/2018 : coping skills, sobriety, pain management     I employed Cognitive Behavioral therapy techniques, Reality-Oriented psychotherapy, as well as supportive psychotherapy in regards to various ongoing psychosocial stressors, including the following: pre-admission and current problems; housing issues; medical issues; and stress of hospitalization. Interpersonal relationship issues and psychodynamic conflicts explored. Attempts made to alleviate maladaptive patterns. We, also, worked on issues of denial & effects of substance dependency/use     Overall, patient is not progressing    Treatment Plan Update (reviewed an updated 8/1/2018) : I will modify psychotherapy tx plan by implementing more stress management strategies, building upon cognitive behavioral techniques, increasing coping skills, as well as shoring up psychological defenses). An extended energy and skill set was needed to engage pt in psychotherapy due to some of the following: resistiveness, complexity, negativity, confrontational nature, hostile behaviors, and/or severe abnormalities in thought processes/psychosis resulting in the loss of expressive/receptive language communication skills. E & M PROGRESS NOTE:         HISTORY       CC:  \"SI/depression \"  HISTORY OF PRESENT ILLNESS/INTERVAL HISTORY:  (reviewed/updated 8/1/2018). per initial evaluation:     Duong Carter presents/reports/evidences the following emotional symptoms today, 8/1/2018:depression.  The above symptoms have been present for years. These symptoms are of severe severity. The symptoms are constant in nature. Additional symptomatology and features include chronic pain. SIDE EFFECTS: (reviewed/updated 8/1/2018)  None reported or admitted to. No noted toxicity with use of Depakote/Tegretol/lithium/Clozaril/TCAs   ALLERGIES:(reviewed/updated 8/1/2018)  Allergies   Allergen Reactions    Sulfamethoxazole-Trimethoprim Rash     L eye and L hand    Ciprofloxacin Hives     Per pcp records    Codeine Hives     Tolerates dilaudid, oxycodone    Lyrica [Pregabalin] Hives    Sulfa (Sulfonamide Antibiotics) Rash    Ultram [Tramadol] Hives      MEDICATIONS PRIOR TO ADMISSION:(reviewed/updated 8/1/2018)  Prescriptions Prior to Admission   Medication Sig    methadone (DOLOPHINE) 10 mg/mL solution Take 115 mg by mouth daily. Indications: Opioid Dependence    escitalopram oxalate (LEXAPRO) 5 mg tablet Take 1 Tab by mouth daily. Indications: Generalized Anxiety Disorder, major depressive disorder    lisinopril (PRINIVIL, ZESTRIL) 20 mg tablet Take 20 mg by mouth daily. Indications: hypertension    ammonium lactate (AMLACTIN) 12 % topical cream Apply  to affected area two (2) times a day. rub in to affected area well   Indications: DRY SKIN    tamsulosin (FLOMAX) 0.4 mg capsule Take 0.4 mg by mouth daily. Indications: bladder cancer    triamcinolone acetonide (KENALOG) 0.1 % topical cream Apply  to affected area. use thin layer    aspirin delayed-release 81 mg tablet Take 81 mg by mouth daily.  colchicine (COLCRYS) 0.6 mg tablet Take 1 Tab by mouth daily as needed. For gout flare    allopurinol (ZYLOPRIM) 100 mg tablet Take 1 Tab by mouth daily.  levothyroxine (SYNTHROID) 75 mcg tablet Take 1 Tab by mouth Daily (before breakfast).       PAST MEDICAL HISTORY: Past medical history from the initial psychiatric evaluation has been reviewed (reviewed/updated 8/1/2018) with no additional updates (I asked patient and no additional past medical history provided). Past Medical History:   Diagnosis Date    Aneurysm [facility])     (with stroke)    Bladder tumor     Family history of bladder cancer     Gout     Hypercholesterolemia     Hypertension     Lesion of bladder     [facility] [address]     Stroke ([facility] [address] 2006    left sided weakness    Thromboembolus ([facility] [address]     Thyroid disease     TIA (transient ischemic attack) 2012     Past Surgical History:   Procedure Laterality Date    HX UROLOGICAL  04/20/2018    Cystoscopy, TURBT (greater than 5 cm resected) Dr. Trinh Link, [email or web address]  KNEE ARTHROSCP HARV      left knee      SOCIAL HISTORY: Social history from the initial psychiatric evaluation has been reviewed (reviewed/updated 8/1/2018) with no additional updates (I asked patient and no additional social history provided). Social History     Social History    Marital status:      Spouse name: N/A    Number of children: N/A    Years of education: N/A     Occupational History    Not on file. Social History Main Topics    Smoking status: Current Every Day Smoker     Packs/day: 1.00    Smokeless tobacco: Never Used    Alcohol use 8.4 oz/week     14 Cans of beer per week    Drug use: Yes     Special: Prescription    Sexual activity: Yes     Other Topics Concern    Not on file     Social History Narrative    60year old  male admitted for reported SI in the context of bladder CA, chronic pain, homelessness, and opiod dependence. Pt has been in multiple psychiatric admissions for the same compliants in the last 2 years,      FAMILY HISTORY: Family history from the initial psychiatric evaluation has been reviewed (reviewed/updated 8/1/2018) with no additional updates (I asked patient and no additional family history provided).  Family History   Problem Relation Age of Onset    Diabetes Father     Diabetes Sister     Diabetes Brother     Cancer Paternal Grandfather      Bladder       REVIEW OF SYSTEMS: (reviewed/updated 8/1/2018)  Appetite:no change from normal   Sleep: does not feel rested   All other Review of Systems: Psychological ROS: positive for - behavioral disorder  Respiratory ROS: no cough, shortness of breath, or wheezing  Cardiovascular ROS: no chest pain or dyspnea on exertion         [facility] (MSE):    MSE FINDINGS ARE WITHIN NORMAL LIMITS (WNL) UNLESS OTHERWISE STATED BELOW. ( ALL OF THE BELOW CATEGORIES OF THE MSE HAVE BEEN REVIEWED (reviewed 8/1/2018) AND UPDATED AS DEEMED APPROPRIATE )  General Presentation age appropriate, cooperative   Orientation oriented to time, place and person   Vital Signs  See below (reviewed 8/1/2018); Vital Signs (BP, Pulse, & Temp) are within normal limits if not listed below. Gait and Station Stable/steady, no ataxia   Musculoskeletal System No extrapyramidal symptoms (EPS); no abnormal muscular movements or Tardive Dyskinesia (TD); muscle strength and tone are within normal limits   Language No aphasia or dysarthria   Speech:  hyperverbal   Thought Processes logical; normal rate of thoughts; poor abstract reasoning/computation   Thought Associations goal directed   Thought Content free of delusions   Suicidal Ideations contracts for safety   Homicidal Ideations none   Mood:  irritable   Affect:  mood-congruent   Memory recent  fair   Memory remote:  fair   Concentration/Attention:  distractable   Fund of Knowledge average   Insight:  limited   Reliability poor   Judgment:  limited          VITALS:     No data found.     Wt Readings from Last 3 Encounters:   07/31/18 104.3 kg (230 lb)   06/05/18 102.3 kg (225 lb 8.5 oz)   06/04/18 104.3 kg (230 lb)     Temp Readings from Last 3 Encounters:   08/01/18 98.1 °F (36.7 °C)   06/08/18 98.5 °F (36.9 °C)   06/06/18 97.8 °F (36.6 °C)     BP Readings from Last 3 Encounters:   08/01/18 (!) 158/95   06/08/18 137/89   06/06/18 121/67     Pulse Readings from Last 3 Encounters:   08/01/18 69   06/08/18 (!) 55   06/06/18 (!) 52            DATA     LABORATORY DATA:(reviewed/updated 8/1/2018)  Recent Results (from the past 24 hour(s))   CBC WITH AUTOMATED DIFF    Collection Time: 07/31/18  4:09 PM   Result Value Ref Range    WBC 9.4 4.1 - 11.1 K/uL    RBC 5.19 4. 10 - 5.70 M/uL    HGB 14.3 12.1 - 17.0 g/dL    HCT 44.1 36.6 - 50.3 %    MCV 85.0 80.0 - 99.0 FL    MCH 27.6 26.0 - 34.0 PG    MCHC 32.4 30.0 - 36.5 g/dL    RDW 15.7 (H) 11.5 - 14.5 %    PLATELET 513 618 - 908 K/uL    MPV 10.1 8.9 - 12.9 FL    NRBC 0.0 0  WBC    ABSOLUTE NRBC 0.00 0.00 - 0.01 K/uL    NEUTROPHILS 65 32 - 75 %    LYMPHOCYTES 27 12 - 49 %    MONOCYTES 5 5 - 13 %    EOSINOPHILS 2 0 - 7 %    BASOPHILS 1 0 - 1 %    IMMATURE GRANULOCYTES 1 (H) 0.0 - 0.5 %    ABS. NEUTROPHILS 6.1 1.8 - 8.0 K/UL    ABS. LYMPHOCYTES 2.5 0.8 - 3.5 K/UL    ABS. MONOCYTES 0.5 0.0 - 1.0 K/UL    ABS. EOSINOPHILS 0.2 0.0 - 0.4 K/UL    ABS. BASOPHILS 0.1 0.0 - 0.1 K/UL    ABS. IMM. GRANS. 0.1 (H) 0.00 - 0.04 K/UL    DF AUTOMATED     METABOLIC PANEL, COMPREHENSIVE    Collection Time: 07/31/18  4:09 PM   Result Value Ref Range    Sodium 136 136 - 145 mmol/L    Potassium 3.9 3.5 - 5.1 mmol/L    Chloride 100 97 - 108 mmol/L    CO2 29 21 - 32 mmol/L    Anion gap 7 5 - 15 mmol/L    Glucose 77 65 - 100 mg/dL    BUN 4 (L) 6 - 20 MG/DL    Creatinine 0.78 0.70 - 1.30 MG/DL    BUN/Creatinine ratio 5 (L) 12 - 20      GFR est AA >60 >60 ml/min/1.73m2    GFR est non-AA >60 >60 ml/min/1.73m2    Calcium 8.7 8.5 - 10.1 MG/DL    Bilirubin, total 0.4 0.2 - 1.0 MG/DL    ALT (SGPT) 30 12 - 78 U/L    AST (SGOT) 28 15 - 37 U/L    Alk.  phosphatase 157 (H) 45 - 117 U/L    Protein, total 7.8 6.4 - 8.2 g/dL    Albumin 4.3 3.5 - 5.0 g/dL    Globulin 3.5 2.0 - 4.0 g/dL    A-G Ratio 1.2 1.1 - 2.2     LIPASE    Collection Time: 07/31/18  4:09 PM   Result Value Ref Range    Lipase 48 (L) 73 - 393 U/L   ACETAMINOPHEN    Collection Time: 07/31/18  4:44 PM   Result Value Ref Range    Acetaminophen level 2 (L) 10 - 30 ug/mL   SALICYLATE    Collection Time: 07/31/18  4:44 PM   Result Value Ref Range    Salicylate level 4.1 2.8 - 20.0 MG/DL   ETHYL ALCOHOL    Collection Time: 07/31/18  4:44 PM   Result Value Ref Range    ALCOHOL(ETHYL),SERUM 74 (H) <10 MG/DL   URINALYSIS W/ REFLEX CULTURE    Collection Time: 07/31/18  5:52 PM   Result Value Ref Range    Color YELLOW/STRAW      Appearance CLEAR CLEAR      Specific gravity 1.006 1.003 - 1.030      pH (UA) 6.0 5.0 - 8.0      Protein NEGATIVE  NEG mg/dL    Glucose NEGATIVE  NEG mg/dL    Ketone NEGATIVE  NEG mg/dL    Bilirubin NEGATIVE  NEG      Blood NEGATIVE  NEG      Urobilinogen 0.2 0.2 - 1.0 EU/dL    Nitrites NEGATIVE  NEG      Leukocyte Esterase NEGATIVE  NEG      WBC 0-4 0 - 4 /hpf    RBC 0-5 0 - 5 /hpf    Epithelial cells FEW FEW /lpf    Bacteria NEGATIVE  NEG /hpf    UA:UC IF INDICATED CULTURE NOT INDICATED BY UA RESULT CNI      Hyaline cast 0-2 0 - 5 /lpf   DRUG SCREEN, URINE    Collection Time: 07/31/18  5:52 PM   Result Value Ref Range    AMPHETAMINES NEGATIVE  NEG      BARBITURATES NEGATIVE  NEG      BENZODIAZEPINES NEGATIVE  NEG      COCAINE NEGATIVE  NEG      METHADONE POSITIVE (A) NEG      OPIATES NEGATIVE  NEG      PCP(PHENCYCLIDINE) NEGATIVE  NEG      THC (TH-CANNABINOL) NEGATIVE  NEG      Drug screen comment (NOTE)      Lab Results   Component Value Date/Time    Valproic acid 99 10/10/2016 04:23 AM     No results found for: LITHM   RADIOLOGY REPORTS:(reviewed/updated 8/1/2018)  No results found.        MEDICATIONS     ALL MEDICATIONS:   Current Facility-Administered Medications   Medication Dose Route Frequency    ziprasidone (GEODON) 20 mg in sterile water (preservative free) 1 mL injection  20 mg IntraMUSCular BID PRN    OLANZapine (ZyPREXA) tablet 5 mg  5 mg Oral Q6H PRN    benztropine (COGENTIN) tablet 2 mg  2 mg Oral BID PRN    benztropine (COGENTIN) injection 2 mg  2 mg IntraMUSCular BID PRN    LORazepam (ATIVAN) injection 2 mg  2 mg IntraMUSCular Q4H PRN    LORazepam (ATIVAN) tablet 1 mg  1 mg Oral Q4H PRN    zolpidem (AMBIEN) tablet 10 mg  10 mg Oral QHS PRN    acetaminophen (TYLENOL) tablet 650 mg  650 mg Oral Q4H PRN    ibuprofen (MOTRIN) tablet 400 mg  400 mg Oral Q8H PRN    magnesium hydroxide (MILK OF MAGNESIA) 400 mg/5 mL oral suspension 30 mL  30 mL Oral DAILY PRN    nicotine (NICODERM CQ) 21 mg/24 hr patch 1 Patch  1 Patch TransDERmal DAILY PRN    methadone (DOLOPHINE) 5 mg/5 mL oral solution 115 mg  115 mg Oral DAILY    [START ON 8/2/2018] escitalopram oxalate (LEXAPRO) tablet 5 mg  5 mg Oral DAILY    [START ON 8/2/2018] tamsulosin (FLOMAX) capsule 0.4 mg  0.4 mg Oral DAILY    [START ON 8/2/2018] lisinopril (PRINIVIL, ZESTRIL) tablet 20 mg  20 mg Oral DAILY    [START ON 8/2/2018] allopurinol (ZYLOPRIM) tablet 100 mg  100 mg Oral DAILY    colchicine tablet 0.6 mg  0.6 mg Oral ONCE    [START ON 8/2/2018] levothyroxine (SYNTHROID) tablet 75 mcg  75 mcg Oral ACB      SCHEDULED MEDICATIONS:   Current Facility-Administered Medications   Medication Dose Route Frequency    methadone (DOLOPHINE) 5 mg/5 mL oral solution 115 mg  115 mg Oral DAILY    [START ON 8/2/2018] escitalopram oxalate (LEXAPRO) tablet 5 mg  5 mg Oral DAILY    [START ON 8/2/2018] tamsulosin (FLOMAX) capsule 0.4 mg  0.4 mg Oral DAILY    [START ON 8/2/2018] lisinopril (PRINIVIL, ZESTRIL) tablet 20 mg  20 mg Oral DAILY    [START ON 8/2/2018] allopurinol (ZYLOPRIM) tablet 100 mg  100 mg Oral DAILY    colchicine tablet 0.6 mg  0.6 mg Oral ONCE    [START ON 8/2/2018] levothyroxine (SYNTHROID) tablet 75 mcg  75 mcg Oral ACB          ASSESSMENT & PLAN     DIAGNOSES REQUIRING ACTIVE TREATMENT AND MONITORING: (reviewed/updated 8/1/2018)  Patient Active Hospital Problem List:   Major depressive disorder with current active episode (12/27/2017)    Assessment: sadness, hopelessness, helplessness, poor energy insomnia     Plan: discuss antidepressant           .     In summary, Virginia Wilson, is a 47 y.o.  male who presents with a severe exacerbation of the principal diagnosis of Major depressive disorder with current active episode  Patient's condition is worsening/not improving/not stable . Patient requires continued inpatient hospitalization for further stabilization, safety monitoring and medication management. I will continue to coordinate the provision of individual, milieu, occupational, group, and substance abuse therapies to address target symptoms/diagnoses as deemed appropriate for the individual patient. A coordinated, multidisplinary treatment team round was conducted with the patient (this team consists of the nurse, psychiatric unit pharmcist,  and writer). Complete current electronic health record for patient has been reviewed today including consultant notes, ancillary staff notes, nurses and psychiatric tech notes. Suicide risk assessment completed and patient deemed to be of low risk for suicide at this time. The following regarding medications was addressed during rounds with patient:   the risks and benefits of the proposed medication. The patient was given the opportunity to ask questions. Informed consent given to the use of the above medications. Will continue to adjust psychiatric and non-psychiatric medications (see above \"medication\" section and orders section for details) as deemed appropriate & based upon diagnoses and response to treatment. I will continue to order blood tests/labs and diagnostic tests as deemed appropriate and review results as they become available (see orders for details and above listed lab/test results). I will order psychiatric records from previous [facility] hospitals to further elucidate the nature of patient's psychopathology and review once available.     I will gather additional collateral information from friends, family and o/p treatment team to further elucidate the nature of patient's psychopathology and baselline level of psychiatric functioning. I certify that this patient's inpatient psychiatric hospital services furnished since the previous certification were, and continue to be, required for treatment that could reasonably be expected to improve the patient's condition, or for diagnostic study, and that the patient continues to need, on a daily basis, active treatment furnished directly by or requiring the supervision of inpatient psychiatric facility personnel. In addition, the hospital records show that services furnished were intensive treatment services, admission or related services, or equivalent services.     EXPECTED DISCHARGE DATE/DAY: TBD     DISPOSITION: Home       Signed By:   Lorie Mendez MD  8/1/2018

## 2018-08-01 NOTE — BSMART NOTE
[facility] is still in the process of reviewing patient informations. The following hospitals are not able to accept due to not having appropriate placement: [facility], [facility] [place], [address] [place] [address] , [address] , [facility], [facility].  unable to find appropriate bed for patient at this time. [facility]

## 2018-08-01 NOTE — PROGRESS NOTES
Confirmed dose (115mg daily) with Fani Gracia at [address] [place] [address] ([phone]) - last received dose on 7/31 AM (does not receive take home doses).

## 2018-08-01 NOTE — BH NOTES
Primary Nurse Yisel Luther RN and Cullen Fothergill, RN performed a dual skin assessment on this patient Impairment noted- see wound doc flow sheet  Dejuan score is 19

## 2018-08-01 NOTE — ED NOTES
Patient stated that he has been experiencing pain to left foot. MD notified and stated she would look through chart and see what medication he can have.

## 2018-08-01 NOTE — BH NOTES
TRANSFER - IN REPORT:    Verbal report received from Jennyfer(name) on Lulu Graff  being received from ED [facility](unit) for routine progression of care      Report consisted of patients Situation, Background, Assessment and   Recommendations(SBAR). Information from the following report(s) SBAR and ED Summary was reviewed with the receiving nurse. Opportunity for questions and clarification was provided. Assessment completed upon patients arrival to unit and care assumed.

## 2018-08-01 NOTE — ED NOTES
Spoke with Herman Amaya, patient has been accepted by Dr. Maco [facility] to room 242-38. Report to be called to [phone].

## 2018-08-01 NOTE — BH NOTES
[facility] arrived to [facility] this [facility] from [facility] ER; patient very agitated upon arrival, stating he was in a lot of pain and that no one was helping him with his medical problems; after some redirection, patient became very sad and contrite towards staff, crying with child-like behavior; hospitalist consult placed for patient's multiple medical problems; patient stated he is unable to bear weight on left foot due to wound; received order from Dr Werner Lopez for patient to be able to have his walking boot and cane in his possession; patient VSS upon admission; patient denies current SI/HI stating \"I dont know why I am even here\"; patient denies current [address]; wound care order placed for wound to left lower foot

## 2018-08-01 NOTE — ED NOTES
Patient stated to MD that \"if I go home, I am gonna blow my brains out. \" SADD score of 10 scored by RN after comment. 1:1 sitter at bedside.

## 2018-08-01 NOTE — CONSULTS
[address]    Katie Jarquin  MR#: [id number]  : 1965  ACCOUNT #: [de-identified]   DATE OF SERVICE: 2018    CHIEF COMPLAINT:  I was consulted by psychiatric team for medical evaluation of a new admit to the psychiatric unit. HISTORY OF PRESENT ILLNESS:  The patient was seen and examined. History obtained from patient and review of records. According to the current report, the patient was referred from his podiatrist's office to the emergency room yesterday for a foot ulcer. He was evaluated in the emergency room at [facility], and it is reported that patient stated that if he is going home that he will \"blast his brain out. \" A 1:1 was ordered. It was felt the patient needed psychiatric hospitalization, so he was transferred to [facility] inpatient psychiatric unit. 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 The patient currently complains of left foot ulcer and wound. He states the wound has been there for about 2 years since a heat pump fell on his foot. He has followed with a podiatrist with routine wound care. The patient is telling me his podiatrist has told him that patient needs a cure, and they say they would fix this, but if he wants to go see another foot doctor, the patient does not think his podiatrist will take him back. Otherwise, he denied any fever and chills. He denied headache, diplopia, nausea and vomiting. He denied chest pain, palpitations, diaphoresis. Denied abdominal pain, diarrhea, or constipation. He says he is on methadone for his chronic pain. The methadone lasts him for 18 hours, and he has pain before he makes it to the next dose. He is asking for additional pain medicine. Currently, he uses ibuprofen for breakthrough pain.     CURRENT MEDICATIONS:  As verified by pharmacy include ketaconazole 2% cream, lidocaine, prilocaine 2.5-2% cream, methadone 115 mg daily that was confirmed through speaking with [employer], allopurinol 100 mg, aspirin 81 mg, colchicine 0.6 mg, levothyroxine 75 mcg, lisinopril 20 mg, Flomax 0.4 mg. ALLERGIES:  BACTRIM, CIPROFLOXACIN, CODEINE, LYRICA, SULFA, ULTRAM.    SOCIAL HISTORY:  He is a current smoker, drinks alcohol. FAMILY HISTORY:  Diabetes in his father, sister, and brother, bladder cancer in paternal grandfather. REVIEW OF SYSTEMS:  Positive for pain on the left side, mainly on the foot. The rest of the review of systems is negative. PHYSICAL EXAMINATION:  GENERAL:  Patient alert and oriented x4. VITAL SIGNS:  Temperature 97.9, blood pressure 143/89, respirations 18, oxygen saturation 94%. HEENT:  No pallor or jaundice. LUNGS:  They are clear. HEART:  S1, S2 regular with no gallop or murmur. ABDOMEN:  Soft, nontender, no organomegaly. EXTREMITIES:  Spastic left side. No joint swelling, effusion tenderness. CENTRAL NERVOUS SYSTEM:  He is alert, oriented x4. Cranial nerves are intact. Motor examination:  Spastic hemiparesis on the left side. SKIN:  Wound dressed just a while ago by a wound care nurse that is reviewed. He has a left foot 2nd toe black scar, left medial ankle, some drainage from wounds noted on dressing removal.  The skin is dry, red, warm and tender. LABORATORY DATA:  Complete blood count:  WBC, hemoglobin and platelets are normal.  Urine examination is negative for UTI. Serum chemistry is unremarkable with normal electrolytes, kidney and liver function. Acetaminophen and salicylate levels are within normal limits. Alcohol level on presentation yesterday was 74 and urine drug screen positive for methadone, which he is on a methadone program.    ASSESSMENT AND PLAN:  1.   Patient with history of depression and suicidal ideation, admitted to inpatient psych. Management per primary team.  2.  Hypertension, fairly well controlled and on lisinopril which is ordered. That will be continued. 3.  Hypothyroidism. Patient on Synthroid 75 mcg. Check TSH tomorrow. 4.  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 Patient uses wheelchair and walker. 5.  Opioid dependence and chronic pain. Patient has had long-term opiate dependence. Currently, he is managed with methadone. He goes to [facility]. He gets 115 mg of methadone daily. 6.  Left foot ulcer. It is not infected. Patient is not septic. The patient goes to podiatry clinic routinely. He is advised to continue outpatient follow up upon psychiatric discharge. 7.  History of gout, no acute flare. His maintenance allopurinol and colchicine are both reordered and continued. 8.  Prophylaxis, gastrointestinal:  No indication. Deep vein thrombosis per primary team.  9.  CODE STATUS:  FULL. The patient does not have any acute medical issues. We will sign off. Call us for emergence of any acute medical problems while the patient is in-patient.       MD ROGER Natarajan /   D: 08/01/2018 17:26     T: 08/01/2018 18:17  JOB #: [id number]

## 2018-08-01 NOTE — BSMART NOTE
Comprehensive Assessment Form Part 1      Section I - Disposition    Axis I - Major Depressive Disorder vs Depression due to medical condition   Opioid Dependence  Axis II - Deferred, R/O Personality Disorder Unspecified  Axis III -   Past Medical History:   Diagnosis Date    Aneurysm ([facility]     (with stroke)    Bladder tumor     Family history of bladder cancer     Gout     Hypercholesterolemia     Hypertension     Lesion of bladder     Seizures ([facility]     Stroke ([facility] 2006    left sided weakness    [facility]olus ([facility]     Thyroid disease     TIA (transient ischemic attack) 2012     Axis IV - chronic pain  [place] V - 50      The Medical Doctor to Psychiatrist conference was not completed. The Medical Doctor is in agreement with Psychiatrist disposition because of (reason) patient continues to report he will kill himself if he leaves. The plan is search for an appropriate bed. The on-call Psychiatrist consulted was Dr. David Das. The admitting Psychiatrist will be Dr. Steve Becker. The admitting Diagnosis is TBD. The Payor source is [facility] MEDICAID/[facility]. Section II - Integrated Summary  Summary:  Patient is a 51yo male who presents to ER due to chronic pain and foot infections. Patient reports that he has been dealing with chronic left foot pain due to infection and/or wound and he is tired of living. He reports that he can not continue living like this and no one is willing to help him. Patient reported to ER staff that if he was discharged he would \"blow my brains out. \" He reports that he could end his life numerous ways and that he is tired of living in pain. Patient reports that he takes Advil but that it does not help and he is currently prescribed methadone as well. Patient reports that he is depressed due to his pain and feeling like no one listens to him or cares. He reports living alone and having no support.  Patient reports that his bladder surgery helped but he has a complication which makes him depressed. Discussed with Dr. Jin Prabhakar. Due to patient stating that he will kill himself if he leaves due to severe pain and tired of living \"like this\", patient needs a safety plan. As patient lives alone and reports no support a safety plan is not able to be completed so patient would need admission. Currently no acute beds available in the system. Discussed with ER provider who reports there is no medical reason for admission and patient is medically cleared. The patienthas demonstrated mental capacity to provide informed consent. The information is given by the patient, past medical records and ER provider. The Chief Complaint is pain and depression. The Precipitant Factors are chronic pain. Previous Hospitalizations: Yes  The patient has not previously been in restraints. Current Psychiatrist and/or  is unknown. Lethality Assessment:    The potential for suicide noted by the following: vague plan, ideation and means . The potential for homicide is not noted. The patient has not been a perpetrator of sexual or physical abuse. There are not pending charges. The patient is not felt to be at risk for self harm or harm to others. The attending nurse was advised that security has not been notified. Section III - Psychosocial  The patient's overall mood and attitude is irritable and frustrated. Feelings of helplessness and hopelessness are observed by verbal report due to pain. Generalized anxiety is not observed. Panic is not observed. Phobias are not observed. Obsessive compulsive tendencies are not observed. Section IV - Mental Status Exam  The patient's appearance shows no evidence of impairment. The patient's behavior shows poor impulse control and is restless. The patient is oriented to time, place, person and situation. The patient's speech shows no evidence of impairment. The patient's mood is depressed, is angry and is irritable.   The range of affect is labile. The patient's thought content demonstrates no evidence of impairment. The thought process shows no evidence of impairment. The patient's perception shows no evidence of impairment. The patient's memory shows no evidence of impairment. The patient's appetite shows no evidence of impairment. The patient's sleep has evidence of insomnia. The patient's insight is blaming and The patient shows no insight. The patient's judgement shows no evidence of impairment. Section V - Substance Abuse  The patient is not using substances. Section VI - Living Arrangements  The patient is single. The patient lives alone. The patient does plan to return home upon discharge. The patient does not have legal issues pending. The patient's source of income comes from disability. Congregation and cultural practices have not been voiced at this time. The patient's greatest support comes from no one and this person will not be involved with the treatment. The patient has not been in an event described as horrible or outside the realm of ordinary life experience either currently or in the past.  The patient has not been a victim of sexual/physical abuse. Section VII - Other Areas of Clinical Concern  The highest grade achieved is not assessed with the overall quality of school experience being described as not assessed. The patient is currently disabled and speaks [place] as a primary language. The patient has no communication impairments affecting communication. The patient's preference for learning can be described as: can read and write adequately.   The patient's hearing is normal.  The patient's vision is normal.      Deana Thompson [facility]

## 2018-08-01 NOTE — PROGRESS NOTES
Admission Medication Reconciliation:    Information obtained from:  Communication with [facility] DR KAMRON MOTTA ([phone]) and [address] [place] [address] ([phone])/YBNQSLV interview (EMERY Strickland)/RxQuery    Comments/Recommendations: Updated PTA meds/reviewed patient's allergies. 1)  Pharmacist called methadone clinic to confirm dose (see below). Also, called [address] [place] [address]. All medications have been filled within the last 30 days with the exception of colchicine (6/4/18). Of note, patient was prescribed escitalopram upon discharge from [facility] on 6/8/18 but the patient never filled the medication. 2)  Medication changes (since last review): Added  - ketoconazole 2% cream - apply to affected area daily  - lidocaine-prilocaine 2.5-2% cream - use as directed    Adjusted  - methadone 115mg daily (from 105mg daily) - confirmed dose with Karthik from [address] [place] [address]. Last dosed 7/31 AM.    Removed  - escitalopram 5mg - patient never filled upon discharge on 6/8/18  - ammonium lactate cream- last filled 6/4/18, no refills left  - triamcinolone cream    3)  The [place] Prescription Monitoring Program () was assessed to determine fill history of any controlled medications. The patient has not filled any controlled substances in the last 5 months (exception: methadone from [facility]). Allergies:  Sulfamethoxazole-trimethoprim; Ciprofloxacin; Codeine; Lyrica [pregabalin]; Sulfa (sulfonamide antibiotics); and Ultram [tramadol]    Significant PMH/Disease States:   Past Medical History:   Diagnosis Date    Aneurysm ([facility]     (with stroke)    Bladder tumor     Family history of bladder cancer     Gout     Hypercholesterolemia     Hypertension     Lesion of bladder     Seizures ([facility]     Stroke ([facility] 2006    left sided weakness    Thromboembolus ([facility]     Thyroid disease     TIA (transient ischemic attack) 2012     Chief Complaint for this Admission:  No chief complaint on file.     Prior to Admission Medications:   Prior to Admission Medications   Prescriptions Last Dose Informant Patient Reported? Taking?   allopurinol (ZYLOPRIM) 100 mg tablet  Self No No   Sig: Take 1 Tab by mouth daily. aspirin delayed-release 81 mg tablet  Self Yes No   Sig: Take 81 mg by mouth daily. colchicine (COLCRYS) 0.6 mg tablet  Self No No   Sig: Take 1 Tab by mouth daily as needed. For gout flare   ketoconazole (NIZORAL) 2 % topical cream   Yes Yes   Sig: Apply  to affected area daily. levothyroxine (SYNTHROID) 75 mcg tablet  Self No No   Sig: Take 1 Tab by mouth Daily (before breakfast). lidocaine-prilocaine (EMLA) topical cream   Yes Yes   Sig: Apply to affected area as directed   lisinopril (PRINIVIL, ZESTRIL) 20 mg tablet   Yes No   Sig: Take 20 mg by mouth daily. Indications: hypertension   methadone (DOLOPHINE) 10 mg/mL solution   Yes Yes   Sig: Take 115 mg by mouth daily. Indications: Opioid Dependence   tamsulosin (FLOMAX) 0.4 mg capsule   Yes No   Sig: Take 0.4 mg by mouth daily.  Indications: bladder cancer      Facility-Administered Medications: None     Jamin Chapman, PharmD, BCPP, [facility] Specialist, [address] [place] [address]

## 2018-08-01 NOTE — WOUND CARE
WOCN Note:       New consult placed by RN  for left foot wounds. Chart shows:  Admitted for major depressive disorder; history of CVA, HTN, thromboemboli, cerebral hemorhage, brain aneurysm, bladder cancer,drug overdose and opiod dependency. WBC = 9.4  On 7-31-18. Transfer from [facility]. Assessment:   Patient is A&O x 3, communicative, continent and mobile. Repositions self and able to turn independently for assessment. Originally came when patient was eating lunch and came back 30 minutes later. Patient not incontinent. Bed: standard bed. Patient reports pain with minimal touching and application of dressing. Bilateral heels are clear / did not assess sacrum. 1. POA: removed radha and 4x4's with serous drainage. Cleaned foot and ankle with Marce Klenz Spray. - left foot 2nd toe:  100% black eschar: 2.5cm x 2.0cm x 0.0cm / no drainage, tender and warm skin surrounding with redness. Sensitive to minimal pressure. Applied small amount of Carrasyn Gel and mepilex transfer dressing with tape. - left medial ankle: ser drainage from wound noted on dressing removed. Jocelin wound skin is dry / red / warm and tender. Wound 100% scab: 2.0cm x 3.0cm x 0.0cm . Scant drainage from under scab. Entire foot is painful for patient. Patient repositions self. Recommendations:    Daily: left foot 2nd toe and left medial ankle: clean with Marce Klenz Spray, apply small amount of Carrasyn Gel to both wounds. Cover dressing: mepilex foam to ankle and secure with tape: toe apply mepilex transfer cut to size and secure with tape. Supplies in med room bucket with patient room #. Discussed above plan with patient and RN. MD to place above orderon chart.     Transition of Care: Plan to follow weekly and as needed while admitted to hospital.     Laina MARIEN RN  Wound Care Department  Office: [phone]  Pager: [phone]

## 2018-08-02 PROBLEM — F32.A DEPRESSIVE DISORDER: Status: RESOLVED | Noted: 2018-08-01 | Resolved: 2018-08-02

## 2018-08-02 LAB — TSH SERPL DL<=0.05 MIU/L-ACNC: 13.9 UIU/ML (ref 0.36–3.74)

## 2018-08-02 PROCEDURE — 36415 COLL VENOUS BLD VENIPUNCTURE: CPT | Performed by: HOSPITALIST

## 2018-08-02 PROCEDURE — 65220000003 HC RM SEMIPRIVATE PSYCH

## 2018-08-02 PROCEDURE — 74011250637 HC RX REV CODE- 250/637: Performed by: HOSPITALIST

## 2018-08-02 PROCEDURE — 84443 ASSAY THYROID STIM HORMONE: CPT | Performed by: HOSPITALIST

## 2018-08-02 PROCEDURE — 97161 PT EVAL LOW COMPLEX 20 MIN: CPT

## 2018-08-02 PROCEDURE — 97116 GAIT TRAINING THERAPY: CPT

## 2018-08-02 PROCEDURE — 74011250637 HC RX REV CODE- 250/637: Performed by: PSYCHIATRY & NEUROLOGY

## 2018-08-02 RX ORDER — GABAPENTIN 100 MG/1
200 CAPSULE ORAL 3 TIMES DAILY
Status: DISCONTINUED | OUTPATIENT
Start: 2018-08-02 | End: 2018-08-03 | Stop reason: HOSPADM

## 2018-08-02 RX ORDER — AMITRIPTYLINE HYDROCHLORIDE 25 MG/1
25 TABLET, FILM COATED ORAL
Status: DISCONTINUED | OUTPATIENT
Start: 2018-08-02 | End: 2018-08-03 | Stop reason: HOSPADM

## 2018-08-02 RX ORDER — DULOXETIN HYDROCHLORIDE 30 MG/1
30 CAPSULE, DELAYED RELEASE ORAL 2 TIMES DAILY
Status: DISCONTINUED | OUTPATIENT
Start: 2018-08-02 | End: 2018-08-03 | Stop reason: HOSPADM

## 2018-08-02 RX ADMIN — LEVOTHYROXINE SODIUM 75 MCG: 75 TABLET ORAL at 06:38

## 2018-08-02 RX ADMIN — DULOXETINE HYDROCHLORIDE 30 MG: 30 CAPSULE, DELAYED RELEASE ORAL at 17:03

## 2018-08-02 RX ADMIN — ALLOPURINOL 100 MG: 100 TABLET ORAL at 09:42

## 2018-08-02 RX ADMIN — GABAPENTIN 200 MG: 100 CAPSULE ORAL at 21:10

## 2018-08-02 RX ADMIN — TAMSULOSIN HYDROCHLORIDE 0.4 MG: 0.4 CAPSULE ORAL at 09:42

## 2018-08-02 RX ADMIN — ZOLPIDEM TARTRATE 10 MG: 10 TABLET ORAL at 22:37

## 2018-08-02 RX ADMIN — AMITRIPTYLINE HYDROCHLORIDE 25 MG: 25 TABLET, FILM COATED ORAL at 21:10

## 2018-08-02 RX ADMIN — STANDARDIZED SENNA CONCENTRATE AND DOCUSATE SODIUM 2 TABLET: 8.6; 5 TABLET, FILM COATED ORAL at 09:42

## 2018-08-02 RX ADMIN — IBUPROFEN 400 MG: 400 TABLET ORAL at 07:06

## 2018-08-02 RX ADMIN — METHADONE HYDROCHLORIDE 115 MG: 5 SOLUTION ORAL at 09:45

## 2018-08-02 RX ADMIN — IBUPROFEN 400 MG: 400 TABLET ORAL at 16:40

## 2018-08-02 RX ADMIN — LISINOPRIL 20 MG: 20 TABLET ORAL at 09:42

## 2018-08-02 RX ADMIN — GABAPENTIN 200 MG: 100 CAPSULE ORAL at 16:40

## 2018-08-02 NOTE — PROGRESS NOTES
Problem: Depressed Mood (Adult/Pediatric)  Goal: *STG: Participates in treatment plan  Outcome: Progressing Towards Goal  Out on unit social passively engaged. Mood and affect irritable, angry and depressed. Denies SI, no self harming behaviors. Describes feeling overwhelmed with chronic pain, intense pain to L foot that is newer. Focus is on his pain and states depressed mood is from knowing he is medically not improving. Staff offer medication and coping skills education, limit setting and addressing his frequent medications questions and requests. 1414: friend Lucas Choi called unit using profanities and accusing staff of withholding care. She also called patient advocacy with similar complaints. Pt is unaware of her actions. Discussed his friends concerns and he informed staff he would talk with her later today.

## 2018-08-02 NOTE — PROGRESS NOTES
Problem: Mobility Impaired (Adult and Pediatric)  Goal: *Acute Goals and Plan of Care (Insert Text)  Physical Therapy Goals  Initiated 8/2/2018  1. Patient will move from supine to sit and sit to supine , scoot up and down and roll side to side in bed with independence within 7 day(s). 2.  Patient will transfer from bed to chair and chair to bed with independence using the least restrictive device within 7 day(s). 3.  Patient will perform sit to stand with independence within 7 day(s). 4.  Patient will ambulate with independence for 300 feet with the least restrictive device within 7 day(s). 5.  Patient will ascend/descend 3 stairs with 1 handrail(s) with independence within 7 day(s). physical Therapy EVALUATION  Patient: Rod Vann (89 y.o. male)  Date: 8/2/2018  Primary Diagnosis: Depression Unspecified  Opiate Dependence   Depressive disorder        Precautions:        ASSESSMENT :  Based on the objective data described below, the patient presents with decreased functional mobility, impaired balance, increased pain in left foot and high fall risk following admission for depression. Patient received sitting in dayroom and agreeable to therapy. Patient with L hemiparetic UE/LE from aneurysm. He reports recent diagnosis of cancer and currently is depressed and concerned about the quality of his life. He is fed up with his non healing L foot wound and reports he is not going to be able to handle both cancer treatment and the foot wound. Pt was living with a friend in [place] prior to this admission. Ambulated with a SPC at baseline. Today pt required increased time for task completion. Min A to stand from low couch and CGA to ambulate short distance in day room before agreeing to walk to dining room for lunch. Pt compensates well for hemiplegic side and displays good-fair standing balance.  Pt appears to be close to his baseline however would benefit from continue skilled therapy to address stairs and continued strengthening/endurance/baalnce training. Patient will benefit from skilled intervention to address the above impairments. Patients rehabilitation potential is considered to be Fair  Factors which may influence rehabilitation potential include:   []         None noted  [x]         Mental ability/status  [x]         Medical condition  [x]         Home/family situation and support systems  [x]         Safety awareness  [x]         Pain tolerance/management  []         Other:      PLAN :  Recommendations and Planned Interventions:  [x]           Bed Mobility Training             [x]    Neuromuscular Re-Education  [x]           Transfer Training                   []    Orthotic/Prosthetic Training  [x]           Gait Training                         []    Modalities  [x]           Therapeutic Exercises           []    Edema Management/Control  [x]           Therapeutic Activities            [x]    Patient and Family Training/Education  []           Other (comment):    Frequency/Duration: Patient will be followed by physical therapy  3 times a week to address goals. Discharge Recommendations: To Be Determined. Appears near baseline so would suspect none at this time  Further Equipment Recommendations for Discharge: None     SUBJECTIVE:   Patient stated Who told you I didn't want to go home? Xin Heredia    OBJECTIVE DATA SUMMARY:   HISTORY:    Past Medical History:   Diagnosis Date    Aneurysm ([facility]     (with stroke)    Bladder tumor     Family history of bladder cancer     Gout     Hypercholesterolemia     Hypertension     Lesion of bladder     Seizures ([facility]     Stroke ([facility] 2006    left sided weakness    Thromboembolus ([facility]     Thyroid disease     TIA (transient ischemic attack) 2012     Past Surgical History:   Procedure Laterality Date    HX UROLOGICAL  04/20/2018    Cystoscopy, TURBT (greater than 5 cm resected) Dr. Patrick Skelton, [facility].     KNEE ARTHROSCP HARV      left knee     Prior Level of Function/Home Situation: Lives with a friend in [address] [place] [address]? Has been in motels and group homes recently  Personal factors and/or comorbidities impacting plan of care: aneurysm, cancer, gout, HTN, depression, opoid dependence    Home Situation  Home Environment: Apartment  # Steps to Enter: 0  One/Two Story Residence: One story  Living Alone: No  Support Systems: Friends \ neighbors  Patient Expects to be Discharged to[de-identified] Unknown  Current DME Used/Available at Home: Cane, straight    EXAMINATION/PRESENTATION/DECISION MAKING:   Critical Behavior:  Neurologic State: Alert           Hearing: Auditory  Auditory Impairment: None  Skin:    Edema:   Range Of Motion:  AROM: Generally decreased, functional (L UE/LE limited d/t previous stroke)                       Strength:    Strength: Generally decreased, functional                    Tone & Sensation:   Tone: Normal              Sensation: Impaired (L sided nerve pain)               Coordination:  Coordination: Generally decreased, functional  Vision:      Functional Mobility:  Bed Mobility:              Transfers:  Sit to Stand: Minimum assistance (from low couch)  Stand to Sit: Contact guard assistance                       Balance:   Sitting: Intact  Standing: Intact; With support  Ambulation/Gait Training:  Distance (ft): 85 Feet (ft)  Assistive Device: Gait belt;Cane, straight  Ambulation - Level of Assistance: Contact guard assistance;Assist x1;Additional time        Gait Abnormalities: Decreased step clearance; Altered arm swing; Foot drop;Trunk sway increased        Base of Support: Widened;Shift to right;Center of gravity altered     Speed/Reva: Pace decreased (<100 feet/min); Slow  Step Length: Right shortened  Swing Pattern: Left asymmetrical                  Stairs:               Therapeutic Exercises:       Functional Measure:  Tinetti test:    Sitting Balance: 1  Arises: 1  Attempts to Rise: 1  Immediate Standing Balance: 1  Standing Balance: 1  Nudged: 0  Eyes Closed: 0  Turn 360 Degrees - Continuous/Discontinuous: 0  Turn 360 Degrees - Steady/Unsteady: 0  Sitting Down: 1  Balance Score: 6  Indication of Gait: 0  R Step Length/Height: 1  L Step Length/Height: 0  R Foot Clearance: 1  L Foot Clearance: 1  Step Symmetry: 0  Step Continuity: 0  Path: 0  Trunk: 0  Walking Time: 0  Gait Score: 3  Total Score: 9       Tinetti Test and G-code impairment scale:  Percentage of Impairment CH    0%   CI    1-19% CJ    20-39% CK    40-59% CL    60-79% CM    80-99% CN     100%   Tinetti  Score 0-28 28 23-27 17-22 12-16 6-11 1-5 0       Tinetti Tool Score Risk of Falls  <19 = High Fall Risk  19-24 = Moderate Fall Risk  25-28 = Low Fall Risk  Tinetti ME. Performance-Oriented Assessment of Mobility Problems in Elderly Patients. Mancini 66; [id number]. (Scoring Description: PT Bulletin Feb. 10, 1993)    Older adults: Nasima Hendricks et al, 2009; n = 1000 [facility] elderly evaluated with ABC, ABBE, ADL, and IADL)  · Mean ABBE score for males aged 69-68 years = 26.21(3.40)  · Mean ABBE score for females age 69-68 years = 25.16(4.30)  · Mean ABBE score for males over 80 years = 23.29(6.02)  · Mean ABBE score for females over 80 years = 17.20(8.32)         G codes: In compliance with CMSs Claims Based Outcome Reporting, the following G-code set was chosen for this patient based on their primary functional limitation being treated: The outcome measure chosen to determine the severity of the functional limitation was the Tientti with a score of 9/28 which was correlated with the impairment scale.     ? Mobility - Walking and Moving Around:     - CURRENT STATUS: CL - 60%-79% impaired, limited or restricted    - GOAL STATUS: CK - 40%-59% impaired, limited or restricted    - D/C STATUS:  ---------------To be determined---------------      Physical Therapy Evaluation Charge Determination   History Examination Presentation Decision-Making   MEDIUM  Complexity : 1-2 comorbidities / personal factors will impact the outcome/ POC  MEDIUM Complexity : 3 Standardized tests and measures addressing body structure, function, activity limitation and / or participation in recreation  LOW Complexity : Stable, uncomplicated  Other outcome measures Tinettti  LOW       Based on the above components, the patient evaluation is determined to be of the following complexity level: LOW     Pain:  Pain Scale 1: Numeric (0 - 10)  Pain Intensity 1: 0  Pain Location 1: Foot  Pain Orientation 1: Left  Pain Description 1: Aching  Pain Intervention(s) 1: Meditation  Activity Tolerance:   Good  Please refer to the flowsheet for vital signs taken during this treatment. After treatment:   [x]         Patient left in no apparent distress sitting up in chair  []         Patient left in no apparent distress in bed  []         Call bell left within reach  [x]         Nursing notified  []         Caregiver present  []         Bed alarm activated    COMMUNICATION/EDUCATION:   The patients plan of care was discussed with: Registered Nurse. [x]         Fall prevention education was provided and the patient/caregiver indicated understanding. [x]         Patient/family have participated as able in goal setting and plan of care. [x]         Patient/family agree to work toward stated goals and plan of care. []         Patient understands intent and goals of therapy, but is neutral about his/her participation. []         Patient is unable to participate in goal setting and plan of care.     Thank you for this referral.  Pako Gandhi, PT   Time Calculation: 24 mins

## 2018-08-02 NOTE — PROGRESS NOTES
Diogenes Alejandre (\"[place]\") actively participated in [address] [place] [address] [place] about forgiveness on [facility] unit    9765 Peng Kim M.Div, M.S, Tobi 602 available at [address] [place] [phone]) no blood

## 2018-08-02 NOTE — PROGRESS NOTES
Spiritual Care Assessment/Progress Note  [facility]      NAME: Jessica Unger      MRN: [id number]  AGE: 47 y.o. SEX: male  Christian Affiliation: Methodist   Language: English     8/2/2018     Total Time (in minutes): 7     Spiritual Assessment begun in [address] through conversation with:         [x]Patient        [] Family    [] Friend(s)        Reason for Consult: Request by staff     Spiritual beliefs: (Please include comment if needed)     [x] Identifies with a kendy tradition:         [] Supported by a kendy community:            [] Claims no spiritual orientation:           [] Seeking spiritual identity:                [] Adheres to an individual form of spirituality:           [] Not able to assess:                           Identified resources for coping:      [] Prayer                               [] Music                  [] Guided Imagery     [] Family/friends                 [] Pet visits     [] Devotional reading                         [] Unknown     [] Other:                                             Interventions offered during this visit: (See comments for more details)    Patient Interventions: Other (comment)           Plan of Care:     [] Support spiritual and/or cultural needs    [] Support AMD and/or advance care planning process      [] Support grieving process   [] Coordinate Rites and/or Rituals    [] Coordination with community clergy   [] No spiritual needs identified at this time   [] Detailed Plan of Care below (See Comments)  [] Make referral to Music Therapy  [] Make referral to Pet Therapy     [] Make referral to Addiction services  [] Make referral to [facility]  [] Make referral to Spiritual Care Partner  [] No future visits requested        [x] Follow up visits as needed     Comments:  attempting visit with pt in [address] [place] [address]. Pt received a phone call at time of arrival. Pt requested  follow up tomorrow. Spiritual care will follow up for support. Ramona Barkley M.Div, M.S, Tobi 605 [facility]([id number])

## 2018-08-02 NOTE — BH NOTES
GROUP THERAPY PROGRESS NOTE    Edward Day participated in the General Unit's Process Group, with a focus identifying feelings and planning for the day. Group time: 75 minutes. Personal goal for participation: To increase the capacity to improve ones mood and structure. Goal orientation: The patient will be able to identify their feelings, develop a plan for structuring their day, and discharge planning. Group therapy participation: With prompting, this patient actively participated in the group, after joining it a little passed skilled nursing through. Therapeutic interventions reviewed and discussed: The group members were asked to introduce themselves to each other and to see if they could identify an emotion they are having and/or let the group know what they want to focus on for the day as they continue to make discharge plans. Impression of participation: The patient said he was struggling with his depression, chronic pain, and uncertainty regarding the next steps in regards to his bladder cancer. \"I really don't feel like I have a reason to live. Fernandez Escobar I have a left foot that I was told would heal, but it's been over two years and the pain is still there. Fernandez Escboar I had one bladder tumor removed this spring and that was painful as hell. Fernandez Escobar I had a brain aneurism that led to a partial stroke that increases the pain on my left side. Fernandez Escobar I don't have anyone in my life who cares about me. 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 Fernandez Escobar I let her take everything because I wanted to make sure my daughter made it through high school. \" He admitted that he is in regular contact with his daughter, who is getting ready to start college and perhaps go to a veterinary school. 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  He admitted to harboring resentment over his wife's treatment of him after he became ill and said he has thought about going after her legally to get a cut of the \"money she took from me. ..but my  has fallen ill. ..and it's complicated. \" It was suggested by a peer that accepting the past, not agreeing with it, might make it easier for him to handle at this point in his life. The patient also shared that his mother  of cancer when he was between [de-identified] and [de-identified] years old; \"I've lived around cancer my whole life. \" He also complained that he did not feel the staff was \"listening to me. ..they might listen to other patients but they've written me off. \" The patient expressed open SI, but without a specific plan while on the unit. He did not show any HI. He also displayed no overt psychotic symptoms, aside perhaps some paranoia about his treatment by the unit staff. His affect was very depressed, angry, and irritable. His mood matched his affect with a sense of feeling helpless and hopeless. He seemed to see his life as filled with pain, both physical and emotional. When pressed, he admitted that his daughter was one of the primary reasons he has not killed himself already. This was the first process group the patient has had with the undersigned in this hospitalization.

## 2018-08-02 NOTE — INTERDISCIPLINARY ROUNDS
Behavioral Health Interdisciplinary Rounds     Patient Name: Lenore Calle  Age: 47 y.o. Room/Bed:  729/  Primary Diagnosis: Major depressive disorder with current active episode   Admission Status: Voluntary     Readmission within 30 days: no  Power of  in place: yes  Patient requires a blocked bed: no          Reason for blocked bed:     VTE Prophylaxis: Not indicated    Mobility needs/Fall risk: no    Nutritional Plan: no  Consults:          Labs/Testing due today?: yes    Sleep hours:  6.75      Participation in Care/Groups:  no and new patient  Medication Compliant?: Yes  PRNS (last 24 hours): Sleep Aid and Pain    Restraints (last 24 hours):  no     CIWA (range last 24 hours):     COWS (range last 24 hours):      Alcohol screening (AUDIT) completed -   AUDIT Score: 2     If applicable, date SBIRT discussed in treatment team AND documented:     Tobacco - patient is a smoker: Have You Used Tobacco in the Past 30 Days: No  Illegal Drugs use: Have You Used Any Illegal Substances Over the Past 12 Months: No    24 hour chart check complete: yes     Patient goal(s) for today: Practice gratitude; attend groups  Treatment team focus/goals: Start Cymbalta, Gabapentin, and Elavil  Progress note: Patient disappointed with care; states he hasn't seen a doctor for his foot (Pt has been seen by wound care). Medication-seeking for pain medications. States he is not \"mentally equipt\" to handle his medical diagnoses.     LOS:  1  Expected LOS: TBD    Financial concerns/prescription coverage:  [place]  Date of last family contact:  None    Family requesting physician contact today:  No  Discharge plan:  Return home  Guns in the home:  No       Outpatient provider(s):  Dr. Alba Roth (PCP)    Participating treatment team members: Lenore CallePreston MSW; Dr. Argentina Briones MD; Odell Olguin, EMERY; Michael Mann, AungD

## 2018-08-02 NOTE — BH NOTES
PRN Medication Documentation    Specific patient behavior that led to need for PRN medication: Pain 8/10 generalized and request sleeping aid   Staff interventions attempted prior to PRN being given: distraction, deep breathing, re-direction offered  PRN medication given: Tylenol and Ambien given   Patient response/effectiveness of PRN medication: Will re-assess patient     2235: Pt verbalizes he is ready to go to bed and pain is well controlled at this time. Medications effective.

## 2018-08-02 NOTE — BH NOTES
[facility] Notes  Date of Service: 08/01/18 400 Ryan Burdick MD   PSYCHIATRY   Expand All Collapse All    []Hide copied text  []Hover for attribution information     PSYCHIATRIC PROGRESS NOTE            Patient Name  Gladis Cole   Date of Birth 1965   CSN [id number]   Medical Record Number  [id number]    Age  47 y.o. PCP Fly James MD   Admit date:  8/1/2018    Room Number  729/01  @ Count includes the [facility]   Date of Service  8/1/2018            PSYCHOTHERAPY SESSION NOTE:  Length of psychotherapy session: 45 minutes     Main condition/diagnosis/issues treated during session today, 8/1/2018 : coping skills, sobriety, pain management      I employed Cognitive Behavioral therapy techniques, Reality-Oriented psychotherapy, as well as supportive psychotherapy in regards to various ongoing psychosocial stressors, including the following: pre-admission and current problems; housing issues; medical issues; and stress of hospitalization. Interpersonal relationship issues and psychodynamic conflicts explored. Attempts made to alleviate maladaptive patterns. We, also, worked on issues of denial & effects of substance dependency/use      Overall, patient is not progressing     Treatment Plan Update (reviewed an updated 8/1/2018) :   I will modify psychotherapy tx plan by implementing more stress management strategies, building upon cognitive behavioral techniques, increasing coping skills, as well as shoring up psychological defenses).     An extended energy and skill set was needed to engage pt in psychotherapy due to some of the following: resistiveness, complexity, negativity, confrontational nature, hostile behaviors, and/or severe abnormalities in thought processes/psychosis resulting in the loss of expressive/receptive language communication skills.                                     E & M PROGRESS NOTE:         HISTORY       CC:  \"SI/depression \"  HISTORY OF PRESENT ILLNESS/INTERVAL HISTORY:  (reviewed/updated 8/1/2018). per initial evaluation:      Saeed Renteria presents/reports/evidences the following emotional symptoms today, 8/1/2018:depression. The above symptoms have been present for years. These symptoms are of severe severity. The symptoms are constant in nature. Additional symptomatology and features include chronic pain. SIDE EFFECTS: (reviewed/updated 8/1/2018)  None reported or admitted to. No noted toxicity with use of Depakote/Tegretol/lithium/Clozaril/TCAs   ALLERGIES:(reviewed/updated 8/1/2018)        Allergies   Allergen Reactions    Sulfamethoxazole-Trimethoprim Rash       L eye and L hand    Ciprofloxacin Hives       Per pcp records    Codeine Hives       Tolerates dilaudid, oxycodone    Lyrica [Pregabalin] Hives    Sulfa (Sulfonamide Antibiotics) Rash    Ultram [Tramadol] Hives       MEDICATIONS PRIOR TO ADMISSION:(reviewed/updated 8/1/2018)       Prescriptions Prior to Admission   Medication Sig    methadone (DOLOPHINE) 10 mg/mL solution Take 115 mg by mouth daily. Indications: Opioid Dependence    escitalopram oxalate (LEXAPRO) 5 mg tablet Take 1 Tab by mouth daily. Indications: Generalized Anxiety Disorder, major depressive disorder    lisinopril (PRINIVIL, ZESTRIL) 20 mg tablet Take 20 mg by mouth daily. Indications: hypertension    ammonium lactate (AMLACTIN) 12 % topical cream Apply  to affected area two (2) times a day. rub in to affected area well   Indications: DRY SKIN    tamsulosin (FLOMAX) 0.4 mg capsule Take 0.4 mg by mouth daily. Indications: bladder cancer    triamcinolone acetonide (KENALOG) 0.1 % topical cream Apply  to affected area. use thin layer    aspirin delayed-release 81 mg tablet Take 81 mg by mouth daily.  colchicine (COLCRYS) 0.6 mg tablet Take 1 Tab by mouth daily as needed. For gout flare    allopurinol (ZYLOPRIM) 100 mg tablet Take 1 Tab by mouth daily.  levothyroxine (SYNTHROID) 75 mcg tablet Take 1 Tab by mouth Daily (before breakfast).      PAST MEDICAL HISTORY: Past medical history from the initial psychiatric evaluation has been reviewed (reviewed/updated 8/1/2018) with no additional updates (I asked patient and no additional past medical history provided). Past Medical History:   Diagnosis Date    Aneurysm ([address]       (with stroke)    Bladder tumor      Family history of bladder cancer      Gout      Hypercholesterolemia      Hypertension      Lesion of bladder      Seizures ([address]      Stroke ([address] 2006     left sided weakness    Thromboembolus ([address]      Thyroid disease      TIA (transient ischemic attack) 2012            Past Surgical History:   Procedure Laterality Date    HX UROLOGICAL   04/20/2018     Cystoscopy, TURBT (greater than 5 cm resected) Dr. Isacc Guillen, [facility].  KNEE ARTHROSCP HARV         left knee       SOCIAL HISTORY: Social history from the initial psychiatric evaluation has been reviewed (reviewed/updated 8/1/2018) with no additional updates (I asked patient and no additional social history provided). Social History            Social History    Marital status:        Spouse name: N/A    Number of children: N/A    Years of education: N/A          Occupational History    Not on file.             Social History Main Topics    Smoking status: Current Every Day Smoker       Packs/day: 1.00    Smokeless tobacco: Never Used    Alcohol use 8.4 oz/week        14 Cans of beer per week     Drug use: Yes       Special: Prescription    Sexual activity: Yes           Other Topics Concern    Not on file          Social History Narrative     60year old  male admitted for reported SI in the context of bladder CA, chronic pain, homelessness, and opiod dependence.  Pt has been in multiple psychiatric admissions for the same compliants in the last 2 years,              FAMILY HISTORY: Family history from the initial psychiatric evaluation has been reviewed (reviewed/updated 8/1/2018) with no additional updates (I asked patient and no additional family history provided). Family History   Problem Relation Age of Onset    Diabetes Father      Diabetes Sister      Diabetes Brother      Cancer Paternal Grandfather         Bladder       REVIEW OF SYSTEMS: (reviewed/updated 8/1/2018)  Appetite:no change from normal   Sleep: does not feel rested   All other Review of Systems: Psychological ROS: positive for - behavioral disorder  Respiratory ROS: no cough, shortness of breath, or wheezing  Cardiovascular ROS: no chest pain or dyspnea on exertion            MENTAL STATUS EXAM & VITALS      MENTAL STATUS EXAM (MSE):    MSE FINDINGS ARE WITHIN NORMAL LIMITS (WNL) UNLESS OTHERWISE STATED BELOW. ( ALL OF THE BELOW CATEGORIES OF THE MSE HAVE BEEN REVIEWED (reviewed 8/1/2018) AND UPDATED AS DEEMED APPROPRIATE )  General Presentation age appropriate, cooperative   Orientation oriented to time, place and person   Vital Signs  See below (reviewed 8/1/2018); Vital Signs (BP, Pulse, & Temp) are within normal limits if not listed below.    Gait and Station Stable/steady, no ataxia   Musculoskeletal System No extrapyramidal symptoms (EPS); no abnormal muscular movements or Tardive Dyskinesia (TD); muscle strength and tone are within normal limits   Language No aphasia or dysarthria   Speech:  hyperverbal   Thought Processes logical; normal rate of thoughts; poor abstract reasoning/computation   Thought Associations goal directed   Thought Content free of delusions   Suicidal Ideations contracts for safety   Homicidal Ideations none   Mood:  irritable   Affect:  mood-congruent   Memory recent  fair   Memory remote:  fair   Concentration/Attention:  distractable   Fund of Knowledge average   Insight:  limited   Reliability poor   Judgment:  limited            VITALS:     No data found.         Wt Readings from Last 3 Encounters:   07/31/18 104.3 kg (230 lb)   06/05/18 102.3 kg (225 lb 8.5 oz)   06/04/18 104.3 kg (230 lb)          Temp Readings from Last 3 Encounters:   08/01/18 98.1 °F (36.7 °C)   06/08/18 98.5 °F (36.9 °C)   06/06/18 97.8 °F (36.6 °C)          BP Readings from Last 3 Encounters:   08/01/18 (!) 158/95   06/08/18 137/89   06/06/18 121/67          Pulse Readings from Last 3 Encounters:   08/01/18 69   06/08/18 (!) 55   06/06/18 (!) 52                DATA      LABORATORY DATA:(reviewed/updated 8/1/2018)   Recent Results    Recent Results (from the past 24 hour(s))   CBC WITH AUTOMATED DIFF     Collection Time: 07/31/18  4:09 PM   Result Value Ref Range     WBC 9.4 4.1 - 11.1 K/uL     RBC 5.19 4. 10 - 5.70 M/uL     HGB 14.3 12.1 - 17.0 g/dL     HCT 44.1 36.6 - 50.3 %     MCV 85.0 80.0 - 99.0 FL     MCH 27.6 26.0 - 34.0 PG     MCHC 32.4 30.0 - 36.5 g/dL     RDW 15.7 (H) 11.5 - 14.5 %     PLATELET 094 154 - 514 K/uL     MPV 10.1 8.9 - 12.9 FL     NRBC 0.0 0  WBC     ABSOLUTE NRBC 0.00 0.00 - 0.01 K/uL     NEUTROPHILS 65 32 - 75 %     LYMPHOCYTES 27 12 - 49 %     MONOCYTES 5 5 - 13 %     EOSINOPHILS 2 0 - 7 %     BASOPHILS 1 0 - 1 %     IMMATURE GRANULOCYTES 1 (H) 0.0 - 0.5 %     ABS. NEUTROPHILS 6.1 1.8 - 8.0 K/UL     ABS. LYMPHOCYTES 2.5 0.8 - 3.5 K/UL     ABS. MONOCYTES 0.5 0.0 - 1.0 K/UL     ABS. EOSINOPHILS 0.2 0.0 - 0.4 K/UL     ABS. BASOPHILS 0.1 0.0 - 0.1 K/UL     ABS. IMM. GRANS. 0.1 (H) 0.00 - 0.04 K/UL     DF AUTOMATED      METABOLIC PANEL, COMPREHENSIVE     Collection Time: 07/31/18  4:09 PM   Result Value Ref Range     Sodium 136 136 - 145 mmol/L     Potassium 3.9 3.5 - 5.1 mmol/L     Chloride 100 97 - 108 mmol/L     CO2 29 21 - 32 mmol/L     Anion gap 7 5 - 15 mmol/L     Glucose 77 65 - 100 mg/dL     BUN 4 (L) 6 - 20 MG/DL     Creatinine 0.78 0.70 - 1.30 MG/DL     BUN/Creatinine ratio 5 (L) 12 - 20       GFR est AA >60 >60 ml/min/1.73m2     GFR est non-AA >60 >60 ml/min/1.73m2     Calcium 8.7 8.5 - 10.1 MG/DL     Bilirubin, total 0.4 0.2 - 1.0 MG/DL     ALT (SGPT) 30 12 - 78 U/L     AST (SGOT) 28 15 - 37 U/L     Alk. phosphatase 157 (H) 45 - 117 U/L     Protein, total 7.8 6.4 - 8.2 g/dL     Albumin 4.3 3.5 - 5.0 g/dL     Globulin 3.5 2.0 - 4.0 g/dL     A-G Ratio 1.2 1.1 - 2.2     LIPASE     Collection Time: 07/31/18  4:09 PM   Result Value Ref Range     Lipase 48 (L) 73 - 393 U/L   ACETAMINOPHEN     Collection Time: 07/31/18  4:44 PM   Result Value Ref Range     Acetaminophen level 2 (L) 10 - 30 ug/mL   SALICYLATE     Collection Time: 07/31/18  4:44 PM   Result Value Ref Range     Salicylate level 4.1 2.8 - 20.0 MG/DL   ETHYL ALCOHOL     Collection Time: 07/31/18  4:44 PM   Result Value Ref Range     ALCOHOL(ETHYL),SERUM 74 (H) <10 MG/DL   URINALYSIS W/ REFLEX CULTURE     Collection Time: 07/31/18  5:52 PM   Result Value Ref Range     Color YELLOW/STRAW        Appearance CLEAR CLEAR       Specific gravity 1.006 1.003 - 1.030       pH (UA) 6.0 5.0 - 8.0       Protein NEGATIVE  NEG mg/dL     Glucose NEGATIVE  NEG mg/dL     Ketone NEGATIVE  NEG mg/dL     Bilirubin NEGATIVE  NEG       Blood NEGATIVE  NEG       Urobilinogen 0.2 0.2 - 1.0 EU/dL     Nitrites NEGATIVE  NEG       Leukocyte Esterase NEGATIVE  NEG       WBC 0-4 0 - 4 /hpf     RBC 0-5 0 - 5 /hpf     Epithelial cells FEW FEW /lpf     Bacteria NEGATIVE  NEG /hpf     UA:UC IF INDICATED CULTURE NOT INDICATED BY UA RESULT CNI       Hyaline cast 0-2 0 - 5 /lpf   DRUG SCREEN, URINE     Collection Time: 07/31/18  5:52 PM   Result Value Ref Range     AMPHETAMINES NEGATIVE  NEG       BARBITURATES NEGATIVE  NEG       BENZODIAZEPINES NEGATIVE  NEG       COCAINE NEGATIVE  NEG       METHADONE POSITIVE (A) NEG       OPIATES NEGATIVE  NEG       PCP(PHENCYCLIDINE) NEGATIVE  NEG       THC (TH-CANNABINOL) NEGATIVE  NEG       Drug screen comment (NOTE)                 Lab Results   Component Value Date/Time     Valproic acid 99 10/10/2016 04:23 AM      No results found for: LITHM   RADIOLOGY REPORTS:(reviewed/updated 8/1/2018)  No results found.         MEDICATIONS      ALL MEDICATIONS: Current Facility-Administered Medications   Medication Dose Route Frequency    ziprasidone (GEODON) 20 mg in sterile water (preservative free) 1 mL injection  20 mg IntraMUSCular BID PRN    OLANZapine (ZyPREXA) tablet 5 mg  5 mg Oral Q6H PRN    benztropine (COGENTIN) tablet 2 mg  2 mg Oral BID PRN    benztropine (COGENTIN) injection 2 mg  2 mg IntraMUSCular BID PRN    LORazepam (ATIVAN) injection 2 mg  2 mg IntraMUSCular Q4H PRN    LORazepam (ATIVAN) tablet 1 mg  1 mg Oral Q4H PRN    zolpidem (AMBIEN) tablet 10 mg  10 mg Oral QHS PRN    acetaminophen (TYLENOL) tablet 650 mg  650 mg Oral Q4H PRN    ibuprofen (MOTRIN) tablet 400 mg  400 mg Oral Q8H PRN    magnesium hydroxide (MILK OF MAGNESIA) 400 mg/5 mL oral suspension 30 mL  30 mL Oral DAILY PRN    nicotine (NICODERM CQ) 21 mg/24 hr patch 1 Patch  1 Patch TransDERmal DAILY PRN    methadone (DOLOPHINE) 5 mg/5 mL oral solution 115 mg  115 mg Oral DAILY    [START ON 8/2/2018] escitalopram oxalate (LEXAPRO) tablet 5 mg  5 mg Oral DAILY    [START ON 8/2/2018] tamsulosin (FLOMAX) capsule 0.4 mg  0.4 mg Oral DAILY    [START ON 8/2/2018] lisinopril (PRINIVIL, ZESTRIL) tablet 20 mg  20 mg Oral DAILY    [START ON 8/2/2018] allopurinol (ZYLOPRIM) tablet 100 mg  100 mg Oral DAILY    colchicine tablet 0.6 mg  0.6 mg Oral ONCE    [START ON 8/2/2018] levothyroxine (SYNTHROID) tablet 75 mcg  75 mcg Oral ACB       SCHEDULED MEDICATIONS:          Current Facility-Administered Medications   Medication Dose Route Frequency    methadone (DOLOPHINE) 5 mg/5 mL oral solution 115 mg  115 mg Oral DAILY    [START ON 8/2/2018] escitalopram oxalate (LEXAPRO) tablet 5 mg  5 mg Oral DAILY    [START ON 8/2/2018] tamsulosin (FLOMAX) capsule 0.4 mg  0.4 mg Oral DAILY    [START ON 8/2/2018] lisinopril (PRINIVIL, ZESTRIL) tablet 20 mg  20 mg Oral DAILY    [START ON 8/2/2018] allopurinol (ZYLOPRIM) tablet 100 mg  100 mg Oral DAILY    colchicine tablet 0.6 mg  0.6 mg Oral ONCE  [START ON 8/2/2018] levothyroxine (SYNTHROID) tablet 75 mcg  75 mcg Oral ACB             ASSESSMENT & PLAN      DIAGNOSES REQUIRING ACTIVE TREATMENT AND MONITORING: (reviewed/updated 8/1/2018)  Patient Active Hospital Problem List:   Major depressive disorder with current active episode (12/27/2017)    Assessment: sadness, hopelessness, helplessness, poor energy insomnia     Plan: discuss antidepressant             .     In summary, Janett Pedraza, is a 47 y.o.  male who presents with a severe exacerbation of the principal diagnosis of Major depressive disorder with current active episode  Patient's condition is worsening/not improving/not stable . Patient requires continued inpatient hospitalization for further stabilization, safety monitoring and medication management. I will continue to coordinate the provision of individual, milieu, occupational, group, and substance abuse therapies to address target symptoms/diagnoses as deemed appropriate for the individual patient. A coordinated, multidisplinary treatment team round was conducted with the patient (this team consists of the nurse, psychiatric unit pharmcist,  and writer).    Complete current electronic health record for patient has been reviewed today including consultant notes, ancillary staff notes, nurses and psychiatric tech notes.     Suicide risk assessment completed and patient deemed to be of low risk for suicide at this time.      The following regarding medications was addressed during rounds with patient:   the risks and benefits of the proposed medication. The patient was given the opportunity to ask questions. Informed consent given to the use of the above medications.  Will continue to adjust psychiatric and non-psychiatric medications (see above \"medication\" section and orders section for details) as deemed appropriate & based upon diagnoses and response to treatment.      I will continue to order blood tests/labs and diagnostic tests as deemed appropriate and review results as they become available (see orders for details and above listed lab/test results).    I will order psychiatric records from previous [facility] hospitals to further elucidate the nature of patient's psychopathology and review once available.     I will gather additional collateral information from friends, family and o/p treatment team to further elucidate the nature of patient's psychopathology and baselline level of psychiatric functioning.         I certify that this patient's inpatient psychiatric hospital services furnished since the previous certification were, and continue to be, required for treatment that could reasonably be expected to improve the patient's condition, or for diagnostic study, and that the patient continues to need, on a daily basis, active treatment furnished directly by or requiring the supervision of inpatient psychiatric facility personnel.  In addition, the hospital records show that services furnished were intensive treatment services, admission or related services, or equivalent services.     EXPECTED DISCHARGE DATE/DAY: TBD      DISPOSITION: Home         Signed By:   Yen Braswell MD  8/1/2018               Electronically signed by Yen Braswell MD at 08/01/18 8489        Admission (Current) on 8/1/2018              Detailed Report         Note Details   Author Yen Braswell MD File Time 08/01/18 1228   Author Type Physician Status Signed   Last  Yen Braswell, [address] #

## 2018-08-02 NOTE — PROGRESS NOTES
Problem: Falls - Risk of  Goal: *Absence of Falls  Document Jordon Fall Risk and appropriate interventions in the flowsheet.    Outcome: Progressing Towards Goal  Fall Risk Interventions:  Mobility Interventions: Communicate number of staff needed for ambulation/transfer, Utilize walker, cane, or other assistive device         Medication Interventions: Teach patient to arise slowly    Elimination Interventions: Patient to call for help with toileting needs

## 2018-08-03 VITALS
OXYGEN SATURATION: 100 % | TEMPERATURE: 97.5 F | SYSTOLIC BLOOD PRESSURE: 137 MMHG | DIASTOLIC BLOOD PRESSURE: 83 MMHG | RESPIRATION RATE: 16 BRPM | HEART RATE: 73 BPM

## 2018-08-03 PROCEDURE — 74011250637 HC RX REV CODE- 250/637: Performed by: HOSPITALIST

## 2018-08-03 PROCEDURE — 74011250637 HC RX REV CODE- 250/637: Performed by: PSYCHIATRY & NEUROLOGY

## 2018-08-03 RX ORDER — GABAPENTIN 100 MG/1
200 CAPSULE ORAL 3 TIMES DAILY
Qty: 90 CAP | Refills: 0 | Status: SHIPPED | OUTPATIENT
Start: 2018-08-03 | End: 2018-09-28

## 2018-08-03 RX ORDER — ASPIRIN 81 MG/1
81 TABLET ORAL DAILY
Status: DISCONTINUED | OUTPATIENT
Start: 2018-08-03 | End: 2018-08-03 | Stop reason: HOSPADM

## 2018-08-03 RX ORDER — LIDOCAINE AND PRILOCAINE 25; 25 MG/G; MG/G
CREAM TOPICAL
Status: DISCONTINUED | OUTPATIENT
Start: 2018-08-03 | End: 2018-08-03 | Stop reason: HOSPADM

## 2018-08-03 RX ORDER — DULOXETIN HYDROCHLORIDE 30 MG/1
30 CAPSULE, DELAYED RELEASE ORAL 2 TIMES DAILY
Qty: 60 CAP | Refills: 0 | Status: SHIPPED | OUTPATIENT
Start: 2018-08-03 | End: 2018-09-28

## 2018-08-03 RX ORDER — AMITRIPTYLINE HYDROCHLORIDE 25 MG/1
25 TABLET, FILM COATED ORAL
Qty: 30 TAB | Refills: 0 | Status: SHIPPED | OUTPATIENT
Start: 2018-08-03 | End: 2018-09-28

## 2018-08-03 RX ORDER — KETOCONAZOLE 20 MG/G
CREAM TOPICAL DAILY
Status: DISCONTINUED | OUTPATIENT
Start: 2018-08-04 | End: 2018-08-03 | Stop reason: HOSPADM

## 2018-08-03 RX ADMIN — GABAPENTIN 200 MG: 100 CAPSULE ORAL at 09:41

## 2018-08-03 RX ADMIN — METHADONE HYDROCHLORIDE 115 MG: 5 SOLUTION ORAL at 09:43

## 2018-08-03 RX ADMIN — ALLOPURINOL 100 MG: 100 TABLET ORAL at 09:41

## 2018-08-03 RX ADMIN — TAMSULOSIN HYDROCHLORIDE 0.4 MG: 0.4 CAPSULE ORAL at 09:41

## 2018-08-03 RX ADMIN — LEVOTHYROXINE SODIUM 75 MCG: 75 TABLET ORAL at 06:10

## 2018-08-03 RX ADMIN — LISINOPRIL 20 MG: 20 TABLET ORAL at 09:41

## 2018-08-03 RX ADMIN — DULOXETINE HYDROCHLORIDE 30 MG: 30 CAPSULE, DELAYED RELEASE ORAL at 09:41

## 2018-08-03 RX ADMIN — STANDARDIZED SENNA CONCENTRATE AND DOCUSATE SODIUM 2 TABLET: 8.6; 5 TABLET, FILM COATED ORAL at 09:41

## 2018-08-03 RX ADMIN — ASPIRIN 81 MG: 81 TABLET, COATED ORAL at 12:24

## 2018-08-03 NOTE — BH NOTES
[facility] Notes  Date of Service: 08/01/18 400 Ryan Burdick MD   PSYCHIATRY   Expand All Collapse All    []Hide copied text  []Hover for attribution information     PSYCHIATRIC PROGRESS NOTE            Patient Name  Britany Frye   Date of Birth 1965   CSN [id number]   Medical Record Number  [id number]    Age  47 y.o. PCP Gonsalo Marcelino MD   Admit date:  8/1/2018    Room Number  729/01  @ [facility]   Date of Service  8/1/2018            PSYCHOTHERAPY SESSION NOTE:  Length of psychotherapy session: 45 minutes     Main condition/diagnosis/issues treated during session today, 8/1/2018 : coping skills, sobriety, pain management      I employed Cognitive Behavioral therapy techniques, Reality-Oriented psychotherapy, as well as supportive psychotherapy in regards to various ongoing psychosocial stressors, including the following: pre-admission and current problems; housing issues; medical issues; and stress of hospitalization. Interpersonal relationship issues and psychodynamic conflicts explored. Attempts made to alleviate maladaptive patterns. We, also, worked on issues of denial & effects of substance dependency/use      Overall, patient is not progressing     Treatment Plan Update (reviewed an updated 8/1/2018) :   I will modify psychotherapy tx plan by implementing more stress management strategies, building upon cognitive behavioral techniques, increasing coping skills, as well as shoring up psychological defenses).     An extended energy and skill set was needed to engage pt in psychotherapy due to some of the following: resistiveness, complexity, negativity, confrontational nature, hostile behaviors, and/or severe abnormalities in thought processes/psychosis resulting in the loss of expressive/receptive language communication skills.                                     E & M PROGRESS NOTE:         HISTORY       CC:  \"SI/depression \"  HISTORY OF PRESENT ILLNESS/INTERVAL HISTORY:  (reviewed/updated 8/1/2018). per initial evaluation:      Melvia Fabry presents/reports/evidences the following emotional symptoms today, 8/1/2018:depression. The above symptoms have been present for years. These symptoms are of severe severity. The symptoms are constant in nature. Additional symptomatology and features include chronic pain. 8/2/18- Pt is crying and despondent. He has SI. He feels helpless and scared about his bladder cancer advancing. He does not have a stable home discharge plan. He endorses suicidal thinking. SIDE EFFECTS: (reviewed/updated 8/1/2018)  None reported or admitted to. No noted toxicity with use of Depakote/Tegretol/lithium/Clozaril/TCAs   ALLERGIES:(reviewed/updated 8/1/2018)        Allergies   Allergen Reactions    Sulfamethoxazole-Trimethoprim Rash       L eye and L hand    Ciprofloxacin Hives       Per pcp records    Codeine Hives       Tolerates dilaudid, oxycodone    Lyrica [Pregabalin] Hives    Sulfa (Sulfonamide Antibiotics) Rash    Ultram [Tramadol] Hives       MEDICATIONS PRIOR TO ADMISSION:(reviewed/updated 8/1/2018)       Prescriptions Prior to Admission   Medication Sig    methadone (DOLOPHINE) 10 mg/mL solution Take 115 mg by mouth daily. Indications: Opioid Dependence    escitalopram oxalate (LEXAPRO) 5 mg tablet Take 1 Tab by mouth daily. Indications: Generalized Anxiety Disorder, major depressive disorder    lisinopril (PRINIVIL, ZESTRIL) 20 mg tablet Take 20 mg by mouth daily. Indications: hypertension    ammonium lactate (AMLACTIN) 12 % topical cream Apply  to affected area two (2) times a day. rub in to affected area well   Indications: DRY SKIN    tamsulosin (FLOMAX) 0.4 mg capsule Take 0.4 mg by mouth daily. Indications: bladder cancer    triamcinolone acetonide (KENALOG) 0.1 % topical cream Apply  to affected area. use thin layer    aspirin delayed-release 81 mg tablet Take 81 mg by mouth daily.     colchicine (COLCRYS) 0.6 mg tablet Take 1 Tab by mouth daily as needed. For gout flare    allopurinol (ZYLOPRIM) 100 mg tablet Take 1 Tab by mouth daily.  levothyroxine (SYNTHROID) 75 mcg tablet Take 1 Tab by mouth Daily (before breakfast).            PAST MEDICAL HISTORY: Past medical history from the initial psychiatric evaluation has been reviewed (reviewed/updated 8/1/2018) with no additional updates (I asked patient and no additional past medical history provided). Past Medical History:   Diagnosis Date    Aneurysm ([address]       (with stroke)    Bladder tumor      Family history of bladder cancer      Gout      Hypercholesterolemia      Hypertension      Lesion of bladder      Seizures ([address]      Stroke ([facility] 2006     left sided weakness    Thromboembolus ([facility]      Thyroid disease      TIA (transient ischemic attack) 2012            Past Surgical History:   Procedure Laterality Date    HX UROLOGICAL   04/20/2018     Cystoscopy, TURBT (greater than 5 cm resected) Dr. Jose Cruz Fang, [facility].  KNEE ARTHROSCP HARV         left knee       SOCIAL HISTORY: Social history from the initial psychiatric evaluation has been reviewed (reviewed/updated 8/1/2018) with no additional updates (I asked patient and no additional social history provided). Social History            Social History    Marital status:        Spouse name: N/A    Number of children: N/A    Years of education: N/A          Occupational History    Not on file.             Social History Main Topics    Smoking status: Current Every Day Smoker       Packs/day: 1.00    Smokeless tobacco: Never Used    Alcohol use 8.4 oz/week        14 Cans of beer per week     Drug use: Yes       Special: Prescription    Sexual activity: Yes           Other Topics Concern    Not on file          Social History Narrative     60year old  male admitted for reported SI in the context of bladder CA, chronic pain, homelessness, and opiod dependence.  Pt has been in multiple psychiatric admissions for the same compliants in the last 2 years,              FAMILY HISTORY: Family history from the initial psychiatric evaluation has been reviewed (reviewed/updated 8/1/2018) with no additional updates (I asked patient and no additional family history provided). Family History   Problem Relation Age of Onset    Diabetes Father      Diabetes Sister      Diabetes Brother      Cancer Paternal Grandfather         Bladder       REVIEW OF SYSTEMS: (reviewed/updated 8/1/2018)  Appetite:no change from normal   Sleep: does not feel rested   All other Review of Systems: Psychological ROS: positive for - behavioral disorder  Respiratory ROS: no cough, shortness of breath, or wheezing  Cardiovascular ROS: no chest pain or dyspnea on exertion            MENTAL STATUS EXAM & VITALS      MENTAL STATUS EXAM (MSE):    MSE FINDINGS ARE WITHIN NORMAL LIMITS (WNL) UNLESS OTHERWISE STATED BELOW. ( ALL OF THE BELOW CATEGORIES OF THE MSE HAVE BEEN REVIEWED (reviewed 8/1/2018) AND UPDATED AS DEEMED APPROPRIATE )  General Presentation age appropriate, cooperative   Orientation oriented to time, place and person   Vital Signs  See below (reviewed 8/1/2018); Vital Signs (BP, Pulse, & Temp) are within normal limits if not listed below.    Gait and Station Stable/steady, no ataxia   Musculoskeletal System No extrapyramidal symptoms (EPS); no abnormal muscular movements or Tardive Dyskinesia (TD); muscle strength and tone are within normal limits   Language No aphasia or dysarthria   Speech:  hyperverbal   Thought Processes logical; normal rate of thoughts; poor abstract reasoning/computation   Thought Associations goal directed   Thought Content free of delusions   Suicidal Ideations contracts for safety   Homicidal Ideations none   Mood:  irritable   Affect:  mood-congruent   Memory recent  fair   Memory remote:  fair   Concentration/Attention:  distractable   Fund of Knowledge average   Insight:  limited   Reliability poor   Judgment: limited            VITALS:     No data found.         Wt Readings from Last 3 Encounters:   07/31/18 104.3 kg (230 lb)   06/05/18 102.3 kg (225 lb 8.5 oz)   06/04/18 104.3 kg (230 lb)          Temp Readings from Last 3 Encounters:   08/01/18 98.1 °F (36.7 °C)   06/08/18 98.5 °F (36.9 °C)   06/06/18 97.8 °F (36.6 °C)          BP Readings from Last 3 Encounters:   08/01/18 (!) 158/95   06/08/18 137/89   06/06/18 121/67          Pulse Readings from Last 3 Encounters:   08/01/18 69   06/08/18 (!) 55   06/06/18 (!) 52                DATA      LABORATORY DATA:(reviewed/updated 8/1/2018)   Recent Results    Recent Results (from the past 24 hour(s))   CBC WITH AUTOMATED DIFF     Collection Time: 07/31/18  4:09 PM   Result Value Ref Range     WBC 9.4 4.1 - 11.1 K/uL     RBC 5.19 4. 10 - 5.70 M/uL     HGB 14.3 12.1 - 17.0 g/dL     HCT 44.1 36.6 - 50.3 %     MCV 85.0 80.0 - 99.0 FL     MCH 27.6 26.0 - 34.0 PG     MCHC 32.4 30.0 - 36.5 g/dL     RDW 15.7 (H) 11.5 - 14.5 %     PLATELET 218 445 - 434 K/uL     MPV 10.1 8.9 - 12.9 FL     NRBC 0.0 0  WBC     ABSOLUTE NRBC 0.00 0.00 - 0.01 K/uL     NEUTROPHILS 65 32 - 75 %     LYMPHOCYTES 27 12 - 49 %     MONOCYTES 5 5 - 13 %     EOSINOPHILS 2 0 - 7 %     BASOPHILS 1 0 - 1 %     IMMATURE GRANULOCYTES 1 (H) 0.0 - 0.5 %     ABS. NEUTROPHILS 6.1 1.8 - 8.0 K/UL     ABS. LYMPHOCYTES 2.5 0.8 - 3.5 K/UL     ABS. MONOCYTES 0.5 0.0 - 1.0 K/UL     ABS. EOSINOPHILS 0.2 0.0 - 0.4 K/UL     ABS. BASOPHILS 0.1 0.0 - 0.1 K/UL     ABS. IMM.  GRANS. 0.1 (H) 0.00 - 0.04 K/UL     DF AUTOMATED      METABOLIC PANEL, COMPREHENSIVE     Collection Time: 07/31/18  4:09 PM   Result Value Ref Range     Sodium 136 136 - 145 mmol/L     Potassium 3.9 3.5 - 5.1 mmol/L     Chloride 100 97 - 108 mmol/L     CO2 29 21 - 32 mmol/L     Anion gap 7 5 - 15 mmol/L     Glucose 77 65 - 100 mg/dL     BUN 4 (L) 6 - 20 MG/DL     Creatinine 0.78 0.70 - 1.30 MG/DL     BUN/Creatinine ratio 5 (L) 12 - 20       GFR est AA >60 >60 ml/min/1.73m2     GFR est non-AA >60 >60 ml/min/1.73m2     Calcium 8.7 8.5 - 10.1 MG/DL     Bilirubin, total 0.4 0.2 - 1.0 MG/DL     ALT (SGPT) 30 12 - 78 U/L     AST (SGOT) 28 15 - 37 U/L     Alk.  phosphatase 157 (H) 45 - 117 U/L     Protein, total 7.8 6.4 - 8.2 g/dL     Albumin 4.3 3.5 - 5.0 g/dL     Globulin 3.5 2.0 - 4.0 g/dL     A-G Ratio 1.2 1.1 - 2.2     LIPASE     Collection Time: 07/31/18  4:09 PM   Result Value Ref Range     Lipase 48 (L) 73 - 393 U/L   ACETAMINOPHEN     Collection Time: 07/31/18  4:44 PM   Result Value Ref Range     Acetaminophen level 2 (L) 10 - 30 ug/mL   SALICYLATE     Collection Time: 07/31/18  4:44 PM   Result Value Ref Range     Salicylate level 4.1 2.8 - 20.0 MG/DL   ETHYL ALCOHOL     Collection Time: 07/31/18  4:44 PM   Result Value Ref Range     ALCOHOL(ETHYL),SERUM 74 (H) <10 MG/DL   URINALYSIS W/ REFLEX CULTURE     Collection Time: 07/31/18  5:52 PM   Result Value Ref Range     Color YELLOW/STRAW        Appearance CLEAR CLEAR       Specific gravity 1.006 1.003 - 1.030       pH (UA) 6.0 5.0 - 8.0       Protein NEGATIVE  NEG mg/dL     Glucose NEGATIVE  NEG mg/dL     Ketone NEGATIVE  NEG mg/dL     Bilirubin NEGATIVE  NEG       Blood NEGATIVE  NEG       Urobilinogen 0.2 0.2 - 1.0 EU/dL     Nitrites NEGATIVE  NEG       Leukocyte Esterase NEGATIVE  NEG       WBC 0-4 0 - 4 /hpf     RBC 0-5 0 - 5 /hpf     Epithelial cells FEW FEW /lpf     Bacteria NEGATIVE  NEG /hpf     UA:UC IF INDICATED CULTURE NOT INDICATED BY UA RESULT CNI       Hyaline cast 0-2 0 - 5 /lpf   DRUG SCREEN, URINE     Collection Time: 07/31/18  5:52 PM   Result Value Ref Range     AMPHETAMINES NEGATIVE  NEG       BARBITURATES NEGATIVE  NEG       BENZODIAZEPINES NEGATIVE  NEG       COCAINE NEGATIVE  NEG       METHADONE POSITIVE (A) NEG       OPIATES NEGATIVE  NEG       PCP(PHENCYCLIDINE) NEGATIVE  NEG       THC (TH-CANNABINOL) NEGATIVE  NEG       Drug screen comment (NOTE)                 Lab Results   Component Value Date/Time     Valproic acid 99 10/10/2016 04:23 AM      No results found for: LITHM   RADIOLOGY REPORTS:(reviewed/updated 8/1/2018)  No results found.         MEDICATIONS      ALL MEDICATIONS:          Current Facility-Administered Medications   Medication Dose Route Frequency    ziprasidone (GEODON) 20 mg in sterile water (preservative free) 1 mL injection  20 mg IntraMUSCular BID PRN    OLANZapine (ZyPREXA) tablet 5 mg  5 mg Oral Q6H PRN    benztropine (COGENTIN) tablet 2 mg  2 mg Oral BID PRN    benztropine (COGENTIN) injection 2 mg  2 mg IntraMUSCular BID PRN    LORazepam (ATIVAN) injection 2 mg  2 mg IntraMUSCular Q4H PRN    LORazepam (ATIVAN) tablet 1 mg  1 mg Oral Q4H PRN    zolpidem (AMBIEN) tablet 10 mg  10 mg Oral QHS PRN    acetaminophen (TYLENOL) tablet 650 mg  650 mg Oral Q4H PRN    ibuprofen (MOTRIN) tablet 400 mg  400 mg Oral Q8H PRN    magnesium hydroxide (MILK OF MAGNESIA) 400 mg/5 mL oral suspension 30 mL  30 mL Oral DAILY PRN    nicotine (NICODERM CQ) 21 mg/24 hr patch 1 Patch  1 Patch TransDERmal DAILY PRN    methadone (DOLOPHINE) 5 mg/5 mL oral solution 115 mg  115 mg Oral DAILY    [START ON 8/2/2018] escitalopram oxalate (LEXAPRO) tablet 5 mg  5 mg Oral DAILY    [START ON 8/2/2018] tamsulosin (FLOMAX) capsule 0.4 mg  0.4 mg Oral DAILY    [START ON 8/2/2018] lisinopril (PRINIVIL, ZESTRIL) tablet 20 mg  20 mg Oral DAILY    [START ON 8/2/2018] allopurinol (ZYLOPRIM) tablet 100 mg  100 mg Oral DAILY    colchicine tablet 0.6 mg  0.6 mg Oral ONCE    [START ON 8/2/2018] levothyroxine (SYNTHROID) tablet 75 mcg  75 mcg Oral ACB       SCHEDULED MEDICATIONS:          Current Facility-Administered Medications   Medication Dose Route Frequency    methadone (DOLOPHINE) 5 mg/5 mL oral solution 115 mg  115 mg Oral DAILY    [START ON 8/2/2018] escitalopram oxalate (LEXAPRO) tablet 5 mg  5 mg Oral DAILY    [START ON 8/2/2018] tamsulosin (FLOMAX) capsule 0.4 mg  0.4 mg Oral DAILY    [START ON 8/2/2018] lisinopril (PRINIVIL, ZESTRIL) tablet 20 mg  20 mg Oral DAILY    [START ON 8/2/2018] allopurinol (ZYLOPRIM) tablet 100 mg  100 mg Oral DAILY    colchicine tablet 0.6 mg  0.6 mg Oral ONCE    [START ON 8/2/2018] levothyroxine (SYNTHROID) tablet 75 mcg  75 mcg Oral ACB             ASSESSMENT & PLAN      DIAGNOSES REQUIRING ACTIVE TREATMENT AND MONITORING: (reviewed/updated 8/1/2018)  Patient Active Hospital Problem List:   Major depressive disorder with current active episode (12/27/2017)    Assessment: sadness, hopelessness, helplessness, poor energy insomnia     Plan: discuss antidepressant             .     In summary, Shahla Erazo, is a 47 y.o.  male who presents with a severe exacerbation of the principal diagnosis of Major depressive disorder with current active episode  Patient's condition is worsening/not improving/not stable . Patient requires continued inpatient hospitalization for further stabilization, safety monitoring and medication management. I will continue to coordinate the provision of individual, milieu, occupational, group, and substance abuse therapies to address target symptoms/diagnoses as deemed appropriate for the individual patient. A coordinated, multidisplinary treatment team round was conducted with the patient (this team consists of the nurse, psychiatric unit pharmcist,  and writer).    Complete current electronic health record for patient has been reviewed today including consultant notes, ancillary staff notes, nurses and psychiatric tech notes.     Suicide risk assessment completed and patient deemed to be of low risk for suicide at this time.      The following regarding medications was addressed during rounds with patient:   the risks and benefits of the proposed medication. The patient was given the opportunity to ask questions. Informed consent given to the use of the above medications.  Will continue to adjust psychiatric and non-psychiatric medications (see above \"medication\" section and orders section for details) as deemed appropriate & based upon diagnoses and response to treatment.      I will continue to order blood tests/labs and diagnostic tests as deemed appropriate and review results as they become available (see orders for details and above listed lab/test results).    I will order psychiatric records from previous [facility] hospitals to further elucidate the nature of patient's psychopathology and review once available.     I will gather additional collateral information from friends, family and o/p treatment team to further elucidate the nature of patient's psychopathology and baselline level of psychiatric functioning.         I certify that this patient's inpatient psychiatric hospital services furnished since the previous certification were, and continue to be, required for treatment that could reasonably be expected to improve the patient's condition, or for diagnostic study, and that the patient continues to need, on a daily basis, active treatment furnished directly by or requiring the supervision of inpatient psychiatric facility personnel.  In addition, the hospital records show that services furnished were intensive treatment services, admission or related services, or equivalent services.     EXPECTED DISCHARGE DATE/DAY: TBD      DISPOSITION: Home         Signed By:   Sergio Andersen MD  8/1/2018               Electronically signed by Sergio Andersen MD at 08/01/18 1227        Admission (Current) on 8/1/2018              Detailed Report         Note Details   Author Sergio Andersen MD File Time 08/01/18 1228   Author Type Physician Status Signed   Last  Sergio Andersen, [address] #

## 2018-08-03 NOTE — BH NOTES
GROUP THERAPY PROGRESS NOTE    Rod Vann did not participate in a 65 minute Process Group on the General Unit with a focus identifying feelings, planning for the day, and reviewing DBT coping skills related to distress management.

## 2018-08-03 NOTE — PROGRESS NOTES
Problem: Depressed Mood (Adult/Pediatric)  Goal: *STG: Participates in treatment plan  Outcome: Progressing Towards Goal  Out on unit passively engaged, declined group participation and isolating to room during group times. Blaming others, requesting services that are not availble or appropriate to him. Verbalized understanding of his tx plan. Declines to share daily goal with staff. Mood and affect angry and irritable. Does not voice SI to medication nurse and staff during this shift. Staff focus is on d/c planning and after care arrangements    [phone]: Tx team review pt requests and offered education on how all of his requests have been addressed such as:     consult: completed and seen yesterday  Hospitalist consult: hospitalist team signed off on him after reviewing chart. Wound consult: seen daily by nursing for wound care. Does not voice SI during tx team today when discussing d/c plans. 1239: d/c off unit with staff and  with belongings, scripts and AVS. Heading to security to obtain his valuables.  Pt advocacy spoke with pt at this time

## 2018-08-03 NOTE — BH NOTES
Behavioral Health Transition Record to Provider    Patient Name: Melody Ludwig  YOB: 1965  Medical Record Number: [id number]  Date of Admission: 8/1/2018  Date of Discharge: 8/3/2018    Attending Provider: Micheline Keys MD  Discharging Provider: Micheline Keys MD  To contact this individual call [phone] and ask the  to page. If unavailable, ask to be transferred to [facility] Provider on call. [facility] Provider will be available on call 24/7 and during holidays. Primary Care Provider: Singh Gil MD    Allergies   Allergen Reactions    Sulfamethoxazole-Trimethoprim Rash     L eye and L hand    Ciprofloxacin Hives     Per pcp records    Codeine Hives     Tolerates dilaudid, oxycodone    Lyrica [Pregabalin] Hives    Sulfa (Sulfonamide Antibiotics) Rash    Ultram [Tramadol] Hives       Reason for Admission: Pt admitted under a voluntary basis for severe depression with suicidal ideations  proving to be an imminent danger to self and others and an inability to care for self. Admission Diagnosis: Depression Unspecified  Opiate Dependence   Depressive disorder    * No surgery found *    Results for orders placed or performed during the hospital encounter of 08/01/18   TSH 3RD GENERATION   Result Value Ref Range    TSH 13.90 (H) 0.36 - 3.74 uIU/mL       Immunizations administered during this encounter:   Immunization History   Administered Date(s) Administered    Influenza Vaccine 10/01/2017       Screening for Metabolic Disorders for Patients on Antipsychotic Medications  (Data obtained from the EMR)    Estimated Body Mass Index  Estimated body mass index is 30.34 kg/(m^2) as calculated from the following:    Height as of 7/31/18: 6' 1\" (1.854 m). Weight as of 7/31/18: 104.3 kg (230 lb).      Vital Signs/Blood Pressure  Visit Vitals    /83 (BP Patient Position: At rest)    Pulse 73    Temp 97.5 °F (36.4 °C)    Resp 16    SpO2 100%       Blood Glucose/Hemoglobin A1c  Lab Results   Component Value Date/Time    Glucose 77 2018 04:09 PM    Glucose (POC) 150 (H) 2018 11:45 AM       Lab Results   Component Value Date/Time    Hemoglobin A1c 4.8 2018 04:29 AM        Lipid Panel  Lab Results   Component Value Date/Time    Cholesterol, total 152 2012 03:30 AM    HDL Cholesterol 26 2012 03:30 AM    LDL, calculated 92.2 2012 03:30 AM    Triglyceride 169 (H) 2012 03:30 AM    CHOL/HDL Ratio 5.8 (H) 2012 03:30 AM        Discharge Diagnosis: Major depressive disorder with current active episode (ICD-10-CM: F32.9)    Discharge Plan: Patient discharged off property with security staff. DISCHARGE SUMMARY:  Substance Abuse    NAME:Wei Henry  : 1965  MRN: [id number]    The patient Vicente Handy exhibits the ability to function in a less restrictive environment. There has been no evidence of withdrawal for the past 24 hours. No suicidal/homicidal threat or behavior has been observed for the past 24 hours. If weapons involved, how are they secured? No weapons involved. Is patient aware of and in agreement with discharge plan? Patient is being administratively discharged due to reaching maximum benefit on the unit. Arrangements for medication:  Prescriptions given to patient. Referral for substance treatment? Patient refused. Referral for smoking cessation needed? Patient is not a smoker/Not applicable. Copy of discharge instructions to provider?:  [employer] [facility]    Arrangements for transportation home:  Patient to arrange his own transportation. Mental health crisis number:  024 or your local mental health crisis line number at [phone] [facility]) or [phone] [facility]).     Discharge Medication List and Instructions:   Discharge Medication List as of 8/3/2018 12:11 PM      START taking these medications    Details   gabapentin (NEURONTIN) 100 mg capsule Take 2 Caps by mouth three (3) times daily. Indications: NEUROPATHIC PAIN, Print, Disp-90 Cap, R-0      DULoxetine (CYMBALTA) 30 mg capsule Take 1 Cap by mouth two (2) times a day. Indications: ANXIETY WITH DEPRESSION, Print, Disp-60 Cap, R-0      amitriptyline (ELAVIL) 25 mg tablet Take 1 Tab by mouth nightly. Indications: depression, Print, Disp-30 Tab, R-0         CONTINUE these medications which have NOT CHANGED    Details   methadone (DOLOPHINE) 10 mg/mL solution Take 115 mg by mouth daily. Indications: Opioid Dependence, Historical Med      ketoconazole (NIZORAL) 2 % topical cream Apply  to affected area daily. , Historical Med      lidocaine-prilocaine (EMLA) topical cream Apply to affected area as directed, Historical Med      lisinopril (PRINIVIL, ZESTRIL) 20 mg tablet Take 20 mg by mouth daily. Indications: hypertension, Historical Med      tamsulosin (FLOMAX) 0.4 mg capsule Take 0.4 mg by mouth daily. Indications: bladder cancer, Historical Med      aspirin delayed-release 81 mg tablet Take 81 mg by mouth daily. , Historical Med      colchicine (COLCRYS) 0.6 mg tablet Take 1 Tab by mouth daily as needed. For gout flare, Print, Disp-30 Tab, R-0      allopurinol (ZYLOPRIM) 100 mg tablet Take 1 Tab by mouth daily. , Print, Disp-30 Tab, R-0      levothyroxine (SYNTHROID) 75 mcg tablet Take 1 Tab by mouth Daily (before breakfast). , Print, Disp-30 Tab, R-0             Unresulted Labs     None        To obtain results of studies pending at discharge, please contact [phone]    Follow-up Information     Follow up With Details Comments 2985 Tam Victoria MD   [address] [id number]   172[facility] on 8/6/2018 Walk-in Monday through Thursday starting at 7:00am to enroll in mental health and substance abuse follow-up services.  [address]  [phone]          Advanced Directive:   Does the patient have an appointed surrogate decision maker? No  Does the patient have a Medical Advance Directive? No  Does the patient have a Psychiatric Advance Directive? No  If the patient does not have a surrogate or Medical Advance Directive AND Psychiatric Advance Directive, the patient was offered information on these advance directives Yes and Patient declined to complete    Patient Instructions: Please continue all medications until otherwise directed by physician. Tobacco Cessation Discharge Plan:   Is the patient a smoker and needs referral for smoking cessation? No  Patient referred to the following for smoking cessation with an appointment? Refused     Patient was offered medication to assist with smoking cessation at discharge? Refused  Was education for smoking cessation added to the discharge instructions? Yes    Alcohol/Substance Abuse Discharge Plan:   Does the patient have a history of substance/alcohol abuse and requires a referral for treatment? Yes  Patient referred to the following for substance/alcohol abuse treatment with an appointment? Refused, Patient refused all follow-up. Patient was offered medication to assist with alcohol cessation at discharge? Refused  Was education for substance/alcohol abuse added to discharge instructions? Yes    Patient discharged to Home; discussed with patient/caregiver and provided to the patient/caregiver either in hard copy or electronically.

## 2018-08-03 NOTE — INTERDISCIPLINARY ROUNDS
Behavioral Health Interdisciplinary Rounds     Patient Name: Ally Edmond  Age: 47 y.o.   Room/Bed:  729/  Primary Diagnosis: Major depressive disorder with current active episode   Admission Status: Voluntary     Readmission within 30 days: no  Power of  in place: yes  Patient requires a blocked bed: no          Reason for blocked bed:     VTE Prophylaxis: Not indicated    Mobility needs/Fall risk: no    Nutritional Plan: no  Consults:          Labs/Testing due today?:     Sleep hours:  6.75     Participation in Care/Groups:  no and new patient  Medication Compliant?: Yes  PRNS (last 24 hours):    Restraints (last 24 hours):  no     CIWA (range last 24 hours):     COWS (range last 24 hours):      Alcohol screening (AUDIT) completed -   AUDIT Score: 2     If applicable, date SBIRT discussed in treatment team AND documented:     Tobacco - patient is a smoker: Have You Used Tobacco in the Past 30 Days: No  Illegal Drugs use: Have You Used Any Illegal Substances Over the Past 12 Months: No    24 hour chart check complete: yes     Patient goal(s) for today: Discharge  Treatment team focus/goals: Discharge  Progress note: Patient threatening staff; states he is going to file charges against the hospital.      LOS:  2  Expected LOS: 2    Financial concerns/prescription coverage:  [place]  Date of last family contact:  None    Family requesting physician contact today:  No  Discharge plan:  Return home  Guns in the home:  No       Outpatient provider(s):  Dr. Claudene Ashing (PCP)    Participating treatment team members: Ally GloriaboydprosperColby MSW; Dr. Jodie Farley MD; Eather Duane, RN; Aung GastonD

## 2018-08-03 NOTE — PROGRESS NOTES
Pharmacist Discharge Medication Reconciliation    Discharging Provider: Dr. Kayli Rodrigues PMH:   Past Medical History:   Diagnosis Date    Aneurysm [facility])     (with stroke)    Bladder tumor     Family history of bladder cancer     Gout     Hypercholesterolemia     Hypertension     Lesion of bladder     [facility] [address]     Stroke ([facility] [address] 2006    left sided weakness    Thromboembolus ([facility] [address]     Thyroid disease     TIA (transient ischemic attack) 2012     Chief Complaint for this Admission: No chief complaint on file. Allergies: Sulfamethoxazole-trimethoprim; Ciprofloxacin; Codeine; Lyrica [pregabalin]; Sulfa (sulfonamide antibiotics); and Ultram [tramadol]    Discharge Medications:   Current Discharge Medication List        START taking these medications    Details   gabapentin (NEURONTIN) 100 mg capsule Take 2 Caps by mouth three (3) times daily. Indications: NEUROPATHIC PAIN  Qty: 90 Cap, Refills: 0      DULoxetine (CYMBALTA) 30 mg capsule Take 1 Cap by mouth two (2) times a day. Indications: ANXIETY WITH DEPRESSION  Qty: 60 Cap, Refills: 0      amitriptyline (ELAVIL) 25 mg tablet Take 1 Tab by mouth nightly. Indications: depression  Qty: 30 Tab, Refills: 0           CONTINUE these medications which have NOT CHANGED    Details   methadone (DOLOPHINE) 10 mg/mL solution Take 115 mg by mouth daily. Indications: Opioid Dependence      ketoconazole (NIZORAL) 2 % topical cream Apply  to affected area daily. lidocaine-prilocaine (EMLA) topical cream Apply to affected area as directed      lisinopril (PRINIVIL, ZESTRIL) 20 mg tablet Take 20 mg by mouth daily. Indications: hypertension      tamsulosin (FLOMAX) 0.4 mg capsule Take 0.4 mg by mouth daily. Indications: bladder cancer      aspirin delayed-release 81 mg tablet Take 81 mg by mouth daily. colchicine (COLCRYS) 0.6 mg tablet Take 1 Tab by mouth daily as needed.  For gout flare  Qty: 30 Tab, Refills: 0      allopurinol (ZYLOPRIM) 100 mg tablet Take 1 Tab by mouth daily. Qty: 30 Tab, Refills: 0      levothyroxine (SYNTHROID) 75 mcg tablet Take 1 Tab by mouth Daily (before breakfast).   Qty: 30 Tab, Refills: 0           The patient's chart, MAR and AVS were reviewed by Dilcia Gregg PHARMD.

## 2018-08-03 NOTE — PROGRESS NOTES
is attempting follow up in room 729/2 Pt notes he is in pain and is not up for a visit at the time. He desires follow up in the afternoon.  offered a brief prayer by bedside.      1716 Peng Kim M.Div M.S, [place] 604 [id number]

## 2018-08-03 NOTE — PROGRESS NOTES
Problem: Falls - Risk of  Goal: *Absence of Falls  Document Jordon Fall Risk and appropriate interventions in the flowsheet. Outcome: Progressing Towards Goal  Fall Risk Interventions:  Mobility Interventions: Utilize walker, cane, or other assistive device         Medication Interventions: Teach patient to arise slowly    Elimination Interventions: Patient to call for help with toileting needs     received pt in bed asleep, no distress noted. Pt remains on q15min safety checks, will continue to monitor and maintain safety.

## 2018-08-03 NOTE — BH NOTES
GROUP THERAPY PROGRESS NOTE Shalom Castellano is participating in Reflections Group time: 30 minutes Personal goal for participation: daily  progress Goal orientation: social 
 
Group therapy participation: active Therapeutic interventions reviewed and discussed:  
 
Impression of participation: The patient was an asset to the group.

## 2018-08-03 NOTE — PROGRESS NOTES
Problem: Depressed Mood (Adult/Pediatric)  Goal: *STG: Participates in treatment plan  Outcome: Progressing Towards Goal  Pt attends group,  verbalizes his needs appropriately. No acting out behaviors.     3458 Ambien 10 mg given per pt request.

## 2018-08-03 NOTE — DISCHARGE INSTRUCTIONS
DISCHARGE SUMMARY:  Substance Abuse    NAME:Wei Henry  : 1965  MRN: [id number]    The patient Lulu Graff exhibits the ability to function in a less restrictive environment. There has been no evidence of withdrawal for the past 24 hours. No suicidal/homicidal threat or behavior has been observed for the past 24 hours. If weapons involved, how are they secured? No weapons involved. Is patient aware of and in agreement with discharge plan? Patient is being administratively discharged due to reaching maximum benefit on the unit. Arrangements for medication:  Prescriptions given to patient. Referral for substance treatment? Patient refused. Referral for smoking cessation needed? Patient is not a smoker/Not applicable. Copy of discharge instructions to provider?:  [employer] [facility]    Arrangements for transportation home:  Patient to arrange his own transportation. Mental health crisis number:  152 or your local mental health crisis line number at [phone] [facility]) or [phone] [facility]). DISCHARGE SUMMARY from Nurse    PATIENT INSTRUCTIONS:    What to do at Home:  Recommended activity: Activity as tolerated,     If you experience any of the following symptoms thoughts of harming self, feeling overwhelmed with anxiety, hopelessness, please follow up with your assigned providers and local crisis nubmer. *  Please give a list of your current medications to your Primary Care Provider. *  Please update this list whenever your medications are discontinued, doses are      changed, or new medications (including over-the-counter products) are added. *  Please carry medication information at all times in case of emergency situations.     These are general instructions for a healthy lifestyle:    No smoking/ No tobacco products/ Avoid exposure to second hand smoke  Surgeon General's Warning:  Quitting smoking now greatly reduces serious risk to your health. Obesity, smoking, and sedentary lifestyle greatly increases your risk for illness    A healthy diet, regular physical exercise & weight monitoring are important for maintaining a healthy lifestyle    You may be retaining fluid if you have a history of heart failure or if you experience any of the following symptoms:  Weight gain of 3 pounds or more overnight or 5 pounds in a week, increased swelling in our hands or feet or shortness of breath while lying flat in bed. Please call your doctor as soon as you notice any of these symptoms; do not wait until your next office visit. Recognize signs and symptoms of STROKE:    F-face looks uneven    A-arms unable to move or move unevenly    S-speech slurred or non-existent    T-time-call 911 as soon as signs and symptoms begin-DO NOT go       Back to bed or wait to see if you get better-TIME IS BRAIN. Warning Signs of HEART ATTACK     Call 911 if you have these symptoms:   Chest discomfort. Most heart attacks involve discomfort in the center of the chest that lasts more than a few minutes, or that goes away and comes back. It can feel like uncomfortable pressure, squeezing, fullness, or pain.  Discomfort in other areas of the upper body. Symptoms can include pain or discomfort in one or both arms, the back, neck, jaw, or stomach.  Shortness of breath with or without chest discomfort.  Other signs may include breaking out in a cold sweat, nausea, or lightheadedness. Don't wait more than five minutes to call 911 - MINUTES MATTER! Fast action can save your life. Calling 911 is almost always the fastest way to get lifesaving treatment. Emergency Medical Services staff can begin treatment when they arrive -- up to an hour sooner than if someone gets to the hospital by car. The discharge information has been reviewed with the patient. The patient verbalized understanding.   Discharge medications reviewed with the patient and appropriate educational materials and side effects teaching were provided.   ___________________________________________________________________________________________________________________________________

## 2018-08-08 PROBLEM — G40.909 SEIZURE DISORDER (HCC): Status: ACTIVE | Noted: 2018-08-08

## 2018-08-08 PROBLEM — Z72.0 TOBACCO ABUSE: Status: ACTIVE | Noted: 2018-08-08

## 2018-08-08 PROBLEM — J44.9 CHRONIC OBSTRUCTIVE PULMONARY DISEASE (HCC): Status: ACTIVE | Noted: 2018-08-08

## 2018-08-08 PROBLEM — G89.4 CHRONIC PAIN DISORDER: Status: ACTIVE | Noted: 2018-08-08

## 2018-08-08 PROBLEM — C67.9 MALIGNANT NEOPLASM OF URINARY BLADDER (HCC): Status: ACTIVE | Noted: 2018-03-06

## 2018-08-08 PROBLEM — E87.6 HYPOKALEMIA: Status: ACTIVE | Noted: 2018-08-08

## 2018-08-08 NOTE — DISCHARGE SUMMARY
PSYCHIATRIC DISCHARGE SUMMARY         IDENTIFICATION:    Patient Name  Scotty Oliver   Date of Birth 1965   [facility] [id number]   Medical Record Number  [id number]      Age  47 y.o. PCP Constanza Love MD   Admit date:  8/1/2018    Discharge date: 8/8/2018   Room Number  729/02  @ [facility]   Date of Service  8/8/2018               TYPE OF DISCHARGE: REGULAR               CONDITION AT DISCHARGE: stable       PROVISIONAL & DISCHARGE DIAGNOSES:    Problem List  Date Reviewed: 5/9/2018          Codes Class    Major depression ICD-10-CM: F32.9  ICD-9-CM: 787.53         Uncomplicated opioid dependence ([facility] ICD-10-CM: F11.20  ICD-9-CM: 304.00         Left foot infection ICD-10-CM: L08.9  ICD-9-CM: 197. 9         Bladder tumor ICD-10-CM: D49.4  ICD-9-CM: 239.4         FH: bladder cancer ICD-10-CM: Z80.52  ICD-9-CM: V16.52         Long term current use of methadone for pain control ICD-10-CM: Z79.891  ICD-9-CM: V58.69         * (Principal)Major depressive disorder with current active episode ICD-10-CM: F32.9  ICD-9-CM: 296.30         Physical debility ICD-10-CM: R53.81  ICD-9-CM: 799.3         HTN (hypertension) ICD-10-CM: I10  ICD-9-CM: 401.9         Bilateral cellulitis of lower leg ICD-10-CM: L03.116, L03.115  ICD-9-CM: 682.6         Drug overdose ICD-10-CM: T50.901A  ICD-9-CM: 977.9, E980.5         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 3         Chronic pain ICD-10-CM: G89.29  ICD-9-CM: 338.29     Overview Signed 3/6/2018 12:03 PM by Hever Alvarado     Last Assessment & Plan:   [facility]  Anomalous Vertebra?    No    Allergies that may influence a procedure:   no    Medications that may influence a procedure:   No    PMSH that may influence a procedure:   no    Diagnostic Tests:  EMG/NCS: not done   MRI: Images independently viewed, agree with report   X-ray: Images independently viewed, agree with report     Prior treatments:  Physical Therapy: Yes  Medications tried: opioids  Outside Records: All outside records that may have been scanned into [employer] have been reviewed and discussed with the patient. Procedures: none    This patient seen in consultation referred by Dr. Leticia Hooper For neck pain   RECOMMENDATIONS:  - Oxycodone   - Oxycontin  - Discharge from practice    - High risk of cervical interventions discussed (spinal cord injury, paralysis, seizure, stroke, death)    If fails to progress,  - spine surgery consultation for open surgical procedure  - pain medicine consultation if non-operative care chosen    Key diagnostic test images:  reviewed             Neurologic gait dysfunction ICD-10-CM: R26.9  ICD-9-CM: 781.2         Spasticity ICD-10-CM: R25.2  ICD-9-CM: 781.0         Cerebral infarction ([facility] ICD-10-CM: I63.9  ICD-9-CM: 434.91         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  ICD-9-CM: 338.0         Cerebral hemorrhage with hemiparesis ([facility] ICD-10-CM: I62.9, G81.90  ICD-9-CM: 431, 342.90         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  ICD-9-CM: 338.0         Stroke ([facility] ICD-10-CM: I63.9  ICD-9-CM: 434.91         Hypertension ICD-10-CM: I10  ICD-9-CM: 401.9         Thromboembolism ([facility] ICD-10-CM: I74.9  ICD-9-CM: 444.9               Active Hospital Problems    *Major depressive disorder with current active episode        DISCHARGE DIAGNOSIS:   Axis I:  SEE ABOVE  Axis II: SEE ABOVE  Axis III: SEE ABOVE  Axis IV:  lack of structure  Axis V:  60 on admission, 70 on discharge 70(baseline)       CC & HISTORY OF PRESENT ILLNESS:  47year old  male with chronic pain and depression, as well as homelessness, PT responded well to groups and antidepressant medication and at ddischarge denied SI/HI intent and plan and did not meet TDO criteria.      SOCIAL HISTORY:    Social History     Social History    Marital status:      Spouse name: N/A    Number of children: N/A    Years of education: N/A     Occupational History    Not on file. Social History Main Topics    Smoking status: Current Every Day Smoker     Packs/day: 1.00    Smokeless tobacco: Never Used    Alcohol use 8.4 oz/week     14 Cans of beer per week    Drug use: Yes     Special: Prescription    Sexual activity: Yes     Other Topics Concern    Not on file     Social History Narrative    60year old  male admitted for reported SI in the context of bladder CA, chronic pain, homelessness, and opiod dependence. Pt has been in multiple psychiatric admissions for the same compliants in the last 2 years,      FAMILY HISTORY:   Family History   Problem Relation Age of Onset    Diabetes Father     Diabetes Sister     Diabetes Brother     Cancer Paternal Grandfather      Bladder             HOSPITALIZATION COURSE:    Gladis Cole was admitted to the inpatient psychiatric unit [address] for acute psychiatric stabilization in regards to symptomatology as described in the HPI above. The differential diagnosis at time of admission included: depression . While on the unit Gladis Cole was involved in individual, group, occupational and milieu therapy. Psychiatric medications were adjusted during this hospitalization including ambien . Gladis Cole demonstrated a slow, but progressive improvement in overall condition. Much of patient's depression appeared to be related to situational stressors and psychological factors. Please see individual progress notes for more specific details regarding patient's hospitalization course. At time of dc, Gladis Cole was without significant problems with depression psychosis  octavio. Overall presentation at time of discharge is most consistent with the diagnosis of depression Patient with request for discharge today. There are no grounds to seek a TDO. Patient has maximized benefit to be derived from acute inpatient psychiatric treatment.   All members of the treatment team concur with each other in regards to plans for discharge today per patient's request.          LABS AND IMAGAING:    Labs Reviewed   TSH 3RD GENERATION - Abnormal; Notable for the following:        Result Value    TSH 13.90 (*)     All other components within normal limits     Admission on 08/01/2018, Discharged on 08/03/2018   Component Date Value Ref Range Status    TSH 08/02/2018 13.90* 0.36 - 3.74 uIU/mL Final   Admission on 07/31/2018, Discharged on 08/01/2018   Component Date Value Ref Range Status    WBC 07/31/2018 9.4  4.1 - 11.1 K/uL Final    RBC 07/31/2018 5.19  4. 10 - 5.70 M/uL Final    HGB 07/31/2018 14.3  12.1 - 17.0 g/dL Final    HCT 07/31/2018 44.1  36.6 - 50.3 % Final    MCV 07/31/2018 85.0  80.0 - 99.0 FL Final    MCH 07/31/2018 27.6  26.0 - 34.0 PG Final    MCHC 07/31/2018 32.4  30.0 - 36.5 g/dL Final    RDW 07/31/2018 15.7* 11.5 - 14.5 % Final    PLATELET 97/90/6632 919  150 - 400 K/uL Final    MPV 07/31/2018 10.1  8.9 - 12.9 FL Final    NRBC 07/31/2018 0.0  0  WBC Final    ABSOLUTE NRBC 07/31/2018 0.00  0.00 - 0.01 K/uL Final    NEUTROPHILS 07/31/2018 65  32 - 75 % Final    LYMPHOCYTES 07/31/2018 27  12 - 49 % Final    MONOCYTES 07/31/2018 5  5 - 13 % Final    EOSINOPHILS 07/31/2018 2  0 - 7 % Final    BASOPHILS 07/31/2018 1  0 - 1 % Final    IMMATURE GRANULOCYTES 07/31/2018 1* 0.0 - 0.5 % Final    ABS. NEUTROPHILS 07/31/2018 6.1  1.8 - 8.0 K/UL Final    ABS. LYMPHOCYTES 07/31/2018 2.5  0.8 - 3.5 K/UL Final    ABS. MONOCYTES 07/31/2018 0.5  0.0 - 1.0 K/UL Final    ABS. EOSINOPHILS 07/31/2018 0.2  0.0 - 0.4 K/UL Final    ABS. BASOPHILS 07/31/2018 0.1  0.0 - 0.1 K/UL Final    ABS. IMM.  GRANS. 07/31/2018 0.1* 0.00 - 0.04 K/UL Final    DF 07/31/2018 AUTOMATED    Final    Sodium 07/31/2018 136  136 - 145 mmol/L Final    Potassium 07/31/2018 3.9  3.5 - 5.1 mmol/L Final    Chloride 07/31/2018 100  97 - 108 mmol/L Final    CO2 07/31/2018 29  21 - 32 mmol/L Final  Anion gap 07/31/2018 7  5 - 15 mmol/L Final    Glucose 07/31/2018 77  65 - 100 mg/dL Final    BUN 07/31/2018 4* 6 - 20 MG/DL Final    Creatinine 07/31/2018 0.78  0.70 - 1.30 MG/DL Final    BUN/Creatinine ratio 07/31/2018 5* 12 - 20   Final    GFR est AA 07/31/2018 >60  >60 ml/min/1.73m2 Final    GFR est non-AA 07/31/2018 >60  >60 ml/min/1.73m2 Final    Calcium 07/31/2018 8.7  8.5 - 10.1 MG/DL Final    Bilirubin, total 07/31/2018 0.4  0.2 - 1.0 MG/DL Final    ALT (SGPT) 07/31/2018 30  12 - 78 U/L Final    AST (SGOT) 07/31/2018 28  15 - 37 U/L Final    Alk.  phosphatase 07/31/2018 157* 45 - 117 U/L Final    Protein, total 07/31/2018 7.8  6.4 - 8.2 g/dL Final    Albumin 07/31/2018 4.3  3.5 - 5.0 g/dL Final    Globulin 07/31/2018 3.5  2.0 - 4.0 g/dL Final    A-G Ratio 07/31/2018 1.2  1.1 - 2.2   Final    Acetaminophen level 07/31/2018 2* 10 - 30 ug/mL Final    Salicylate level 16/17/5698 4.1  2.8 - 20.0 MG/DL Final    Color 07/31/2018 YELLOW/STRAW    Final    Appearance 07/31/2018 CLEAR  CLEAR   Final    Specific gravity 07/31/2018 1.006  1.003 - 1.030   Final    pH (UA) 07/31/2018 6.0  5.0 - 8.0   Final    Protein 07/31/2018 NEGATIVE   NEG mg/dL Final    Glucose 07/31/2018 NEGATIVE   NEG mg/dL Final    Ketone 07/31/2018 NEGATIVE   NEG mg/dL Final    Bilirubin 07/31/2018 NEGATIVE   NEG   Final    Blood 07/31/2018 NEGATIVE   NEG   Final    Urobilinogen 07/31/2018 0.2  0.2 - 1.0 EU/dL Final    Nitrites 07/31/2018 NEGATIVE   NEG   Final    Leukocyte Esterase 07/31/2018 NEGATIVE   NEG   Final    WBC 07/31/2018 0-4  0 - 4 /hpf Final    RBC 07/31/2018 0-5  0 - 5 /hpf Final    Epithelial cells 07/31/2018 FEW  FEW /lpf Final    Bacteria 07/31/2018 NEGATIVE   NEG /hpf Final    UA:UC IF INDICATED 07/31/2018 CULTURE NOT INDICATED BY UA RESULT  CNI   Final    Hyaline cast 07/31/2018 0-2  0 - 5 /lpf Final    ALCOHOL(ETHYL),SERUM 07/31/2018 74* <10 MG/DL Final    Lipase 07/31/2018 48* 73  393 U/L Final    AMPHETAMINES 07/31/2018 NEGATIVE   NEG   Final    BARBITURATES 07/31/2018 NEGATIVE   NEG   Final    BENZODIAZEPINES 07/31/2018 NEGATIVE   NEG   Final    COCAINE 07/31/2018 NEGATIVE   NEG   Final    METHADONE 07/31/2018 POSITIVE* NEG   Final    OPIATES 07/31/2018 NEGATIVE   NEG   Final    PCP(PHENCYCLIDINE) 07/31/2018 NEGATIVE   NEG   Final    THC (TH-CANNABINOL) 07/31/2018 NEGATIVE   NEG   Final    Drug screen comment 07/31/2018 (NOTE)   Final     No results found. DISPOSITION:    Home. Patient to f/u with o/p drug/etoh rehabilitation, psychiatric, and psychotherapy appointments. Patient is to f/u with internist as directed. FOLLOW-UP CARE:    Activity as tolerated  Regular Diet  Wound Care: none needed. Follow-up Information     Follow up With Details Comments [id number] Tam Victoria MD   [address] [place] [address] on 8/6/2018 Walk-in Monday through Thursday starting at 7:00am to enroll in mental health and substance abuse follow-up services. [facility]  [phone]                 PROGNOSIS:  Greatly dependent upon patient's ability to f/u with o/p psychiatric/psychotherapy appointments as well as to comply with psychiatric medications as prescribed. Patient denies suicidal or homicidal ideations. Gladis Cole fully contracts for safety. Patient reports many positive predictive factors in terms of not attempting suicide or homicide. Patient appears to be at low risk of suicide or homicide. Patient and family are aware and in agreement with discharge and discharge plan. DISCHARGE MEDICATIONS: (no changes made). Informed consent given for the use of following psychotropic medications:  Cannot display discharge medications since this patient is not currently admitted.              A coordinated, multidisplinary treatment team round was conducted with Tyesha Dixon is done daily here at [facility] . This team consists of the nurse, psychiatric unit pharmcist,  and writer. I have spent greater than 35 minutes on discharge work.     Signed:  Liliana Juarez MD  8/8/2018

## 2018-09-28 ENCOUNTER — APPOINTMENT (OUTPATIENT)
Dept: GENERAL RADIOLOGY | Age: 53
DRG: 364 | End: 2018-09-28
Attending: EMERGENCY MEDICINE
Payer: MEDICAID

## 2018-09-28 ENCOUNTER — OFFICE VISIT (OUTPATIENT)
Dept: URGENT CARE | Age: 53
End: 2018-09-28

## 2018-09-28 ENCOUNTER — HOSPITAL ENCOUNTER (INPATIENT)
Age: 53
LOS: 14 days | Discharge: NURSING, REHAB OR LONG-TERM CARE | DRG: 364 | End: 2018-10-12
Attending: EMERGENCY MEDICINE | Admitting: HOSPITALIST
Payer: MEDICAID

## 2018-09-28 ENCOUNTER — HOSPITAL ENCOUNTER (OUTPATIENT)
Dept: WOUND CARE | Age: 53
End: 2018-09-28

## 2018-09-28 VITALS
TEMPERATURE: 97.5 F | BODY MASS INDEX: 30.22 KG/M2 | HEIGHT: 73 IN | OXYGEN SATURATION: 95 % | RESPIRATION RATE: 16 BRPM | SYSTOLIC BLOOD PRESSURE: 126 MMHG | DIASTOLIC BLOOD PRESSURE: 75 MMHG | WEIGHT: 228 LBS | HEART RATE: 77 BPM

## 2018-09-28 DIAGNOSIS — L08.9 LEFT FOOT INFECTION: ICD-10-CM

## 2018-09-28 DIAGNOSIS — L03.116 CELLULITIS OF LEFT LOWER EXTREMITY: ICD-10-CM

## 2018-09-28 DIAGNOSIS — S91.301A WOUND, OPEN, FOOT WITH COMPLICATION, RIGHT, INITIAL ENCOUNTER: Primary | ICD-10-CM

## 2018-09-28 DIAGNOSIS — Z79.891 LONG TERM CURRENT USE OF METHADONE FOR PAIN CONTROL: ICD-10-CM

## 2018-09-28 DIAGNOSIS — L97.522 FOOT ULCER WITH FAT LAYER EXPOSED, LEFT (HCC): Primary | ICD-10-CM

## 2018-09-28 PROBLEM — L97.509 FOOT ULCER (HCC): Status: ACTIVE | Noted: 2018-09-28

## 2018-09-28 LAB
ALBUMIN SERPL-MCNC: 4 G/DL (ref 3.5–5)
ALBUMIN/GLOB SERPL: 1.4 {RATIO} (ref 1.1–2.2)
ALP SERPL-CCNC: 143 U/L (ref 45–117)
ALT SERPL-CCNC: 21 U/L (ref 12–78)
ANION GAP SERPL CALC-SCNC: 7 MMOL/L (ref 5–15)
AST SERPL-CCNC: 14 U/L (ref 15–37)
BASOPHILS # BLD: 0.1 K/UL (ref 0–0.1)
BASOPHILS NFR BLD: 1 % (ref 0–1)
BILIRUB SERPL-MCNC: 0.6 MG/DL (ref 0.2–1)
BUN SERPL-MCNC: 6 MG/DL (ref 6–20)
BUN/CREAT SERPL: 8 (ref 12–20)
CALCIUM SERPL-MCNC: 8.4 MG/DL (ref 8.5–10.1)
CHLORIDE SERPL-SCNC: 104 MMOL/L (ref 97–108)
CO2 SERPL-SCNC: 29 MMOL/L (ref 21–32)
COMMENT, HOLDF: NORMAL
CREAT SERPL-MCNC: 0.79 MG/DL (ref 0.7–1.3)
DIFFERENTIAL METHOD BLD: ABNORMAL
EOSINOPHIL # BLD: 0.8 K/UL (ref 0–0.4)
EOSINOPHIL NFR BLD: 8 % (ref 0–7)
ERYTHROCYTE [DISTWIDTH] IN BLOOD BY AUTOMATED COUNT: 16 % (ref 11.5–14.5)
GLOBULIN SER CALC-MCNC: 2.9 G/DL (ref 2–4)
GLUCOSE SERPL-MCNC: 97 MG/DL (ref 65–100)
HCT VFR BLD AUTO: 39.1 % (ref 36.6–50.3)
HGB BLD-MCNC: 13 G/DL (ref 12.1–17)
IMM GRANULOCYTES # BLD: 0.1 K/UL (ref 0–0.04)
IMM GRANULOCYTES NFR BLD AUTO: 1 % (ref 0–0.5)
LYMPHOCYTES # BLD: 2.8 K/UL (ref 0.8–3.5)
LYMPHOCYTES NFR BLD: 27 % (ref 12–49)
MCH RBC QN AUTO: 29 PG (ref 26–34)
MCHC RBC AUTO-ENTMCNC: 33.2 G/DL (ref 30–36.5)
MCV RBC AUTO: 87.3 FL (ref 80–99)
MONOCYTES # BLD: 1 K/UL (ref 0–1)
MONOCYTES NFR BLD: 9 % (ref 5–13)
NEUTS SEG # BLD: 5.7 K/UL (ref 1.8–8)
NEUTS SEG NFR BLD: 54 % (ref 32–75)
NRBC # BLD: 0 K/UL (ref 0–0.01)
NRBC BLD-RTO: 0 PER 100 WBC
PLATELET # BLD AUTO: 238 K/UL (ref 150–400)
PMV BLD AUTO: 10.2 FL (ref 8.9–12.9)
POTASSIUM SERPL-SCNC: 3.5 MMOL/L (ref 3.5–5.1)
PROT SERPL-MCNC: 6.9 G/DL (ref 6.4–8.2)
RBC # BLD AUTO: 4.48 M/UL (ref 4.1–5.7)
SAMPLES BEING HELD,HOLD: NORMAL
SODIUM SERPL-SCNC: 140 MMOL/L (ref 136–145)
WBC # BLD AUTO: 10.5 K/UL (ref 4.1–11.1)

## 2018-09-28 PROCEDURE — 74011250637 HC RX REV CODE- 250/637: Performed by: EMERGENCY MEDICINE

## 2018-09-28 PROCEDURE — 85025 COMPLETE CBC W/AUTO DIFF WBC: CPT | Performed by: EMERGENCY MEDICINE

## 2018-09-28 PROCEDURE — 36415 COLL VENOUS BLD VENIPUNCTURE: CPT | Performed by: EMERGENCY MEDICINE

## 2018-09-28 PROCEDURE — 65270000029 HC RM PRIVATE

## 2018-09-28 PROCEDURE — 73630 X-RAY EXAM OF FOOT: CPT

## 2018-09-28 PROCEDURE — 80053 COMPREHEN METABOLIC PANEL: CPT | Performed by: EMERGENCY MEDICINE

## 2018-09-28 PROCEDURE — 74011250637 HC RX REV CODE- 250/637: Performed by: HOSPITALIST

## 2018-09-28 PROCEDURE — 99282 EMERGENCY DEPT VISIT SF MDM: CPT

## 2018-09-28 RX ORDER — LEVOTHYROXINE SODIUM 88 UG/1
88 TABLET ORAL
Status: DISCONTINUED | OUTPATIENT
Start: 2018-09-29 | End: 2018-10-12 | Stop reason: HOSPADM

## 2018-09-28 RX ORDER — ACETAMINOPHEN 325 MG/1
650 TABLET ORAL
Status: DISCONTINUED | OUTPATIENT
Start: 2018-09-28 | End: 2018-10-12 | Stop reason: HOSPADM

## 2018-09-28 RX ORDER — ENOXAPARIN SODIUM 100 MG/ML
40 INJECTION SUBCUTANEOUS EVERY 24 HOURS
Status: DISCONTINUED | OUTPATIENT
Start: 2018-09-29 | End: 2018-09-29

## 2018-09-28 RX ORDER — SODIUM CHLORIDE 0.9 % (FLUSH) 0.9 %
5-10 SYRINGE (ML) INJECTION AS NEEDED
Status: DISCONTINUED | OUTPATIENT
Start: 2018-09-28 | End: 2018-10-12 | Stop reason: HOSPADM

## 2018-09-28 RX ORDER — TAMSULOSIN HYDROCHLORIDE 0.4 MG/1
0.4 CAPSULE ORAL DAILY
Status: DISCONTINUED | OUTPATIENT
Start: 2018-09-29 | End: 2018-10-12 | Stop reason: HOSPADM

## 2018-09-28 RX ORDER — METHADONE HYDROCHLORIDE 10 MG/ML
115 CONCENTRATE ORAL DAILY
Status: DISCONTINUED | OUTPATIENT
Start: 2018-09-29 | End: 2018-09-29 | Stop reason: SDUPTHER

## 2018-09-28 RX ORDER — VANCOMYCIN 2 GRAM/500 ML IN 0.9 % SODIUM CHLORIDE INTRAVENOUS
2000 ONCE
Status: COMPLETED | OUTPATIENT
Start: 2018-09-28 | End: 2018-10-02

## 2018-09-28 RX ORDER — TERBINAFINE HYDROCHLORIDE 250 MG/1
250 TABLET ORAL DAILY
COMMUNITY
End: 2018-10-12

## 2018-09-28 RX ORDER — TRAMADOL HYDROCHLORIDE 50 MG/1
50 TABLET ORAL
Status: COMPLETED | OUTPATIENT
Start: 2018-09-28 | End: 2018-09-28

## 2018-09-28 RX ORDER — CIPROFLOXACIN 500 MG/1
500 TABLET ORAL 2 TIMES DAILY
COMMUNITY
End: 2018-10-12

## 2018-09-28 RX ORDER — LISINOPRIL 20 MG/1
20 TABLET ORAL DAILY
Status: DISCONTINUED | OUTPATIENT
Start: 2018-09-29 | End: 2018-10-12 | Stop reason: HOSPADM

## 2018-09-28 RX ORDER — CLINDAMYCIN HYDROCHLORIDE 300 MG/1
300 CAPSULE ORAL 3 TIMES DAILY
COMMUNITY
End: 2018-10-12

## 2018-09-28 RX ORDER — TRAMADOL HYDROCHLORIDE 50 MG/1
50 TABLET ORAL
COMMUNITY
End: 2018-10-12

## 2018-09-28 RX ORDER — SODIUM CHLORIDE 0.9 % (FLUSH) 0.9 %
5-10 SYRINGE (ML) INJECTION EVERY 8 HOURS
Status: DISCONTINUED | OUTPATIENT
Start: 2018-09-28 | End: 2018-10-12 | Stop reason: HOSPADM

## 2018-09-28 RX ORDER — LEVOTHYROXINE SODIUM 100 UG/1
100 TABLET ORAL
Status: ON HOLD | COMMUNITY
End: 2019-05-24 | Stop reason: SDUPTHER

## 2018-09-28 RX ORDER — OXYCODONE AND ACETAMINOPHEN 5; 325 MG/1; MG/1
1 TABLET ORAL
Status: DISCONTINUED | OUTPATIENT
Start: 2018-09-28 | End: 2018-10-07

## 2018-09-28 RX ORDER — ALLOPURINOL 100 MG/1
100 TABLET ORAL DAILY
Status: DISCONTINUED | OUTPATIENT
Start: 2018-09-29 | End: 2018-10-01

## 2018-09-28 RX ORDER — ONDANSETRON 2 MG/ML
4 INJECTION INTRAMUSCULAR; INTRAVENOUS
Status: DISCONTINUED | OUTPATIENT
Start: 2018-09-28 | End: 2018-10-12 | Stop reason: HOSPADM

## 2018-09-28 RX ORDER — VARENICLINE TARTRATE 1 MG/1
1 TABLET, FILM COATED ORAL
COMMUNITY
End: 2019-05-10

## 2018-09-28 RX ADMIN — TRAMADOL HYDROCHLORIDE 50 MG: 50 TABLET, FILM COATED ORAL at 20:40

## 2018-09-28 RX ADMIN — ACETAMINOPHEN 650 MG: 325 TABLET ORAL at 23:44

## 2018-09-28 RX ADMIN — OXYCODONE AND ACETAMINOPHEN 1 TABLET: 5; 325 TABLET ORAL at 22:49

## 2018-09-28 NOTE — IP AVS SNAPSHOT
[address] [facility] [address] [place] [address] 
[phone] Patient: Emmanuel Thomas MRN: [id number] :1965 A check valentine indicates which time of day the medication should be taken. My Medications START taking these medications Instructions Each Dose to Equal  
 Morning Noon Evening Bedtime  
 acetaminophen 325 mg tablet Commonly known as:  TYLENOL Your last dose was: Your next dose is: Take 2 Tabs by mouth every four (4) hours as needed. Indications: Arthritic Pain 650 mg  
    
   
   
   
  
 amoxicillin-clavulanate 875-125 mg per tablet Commonly known as:  AUGMENTIN Your last dose was: Your next dose is: Take 1 Tab by mouth every twelve (12) hours. 1 Tab  
    
   
   
   
  
 ibuprofen 600 mg tablet Commonly known as:  MOTRIN Your last dose was: Your next dose is: Take 1 Tab by mouth every six (6) hours as needed. Take on full stomach  
 600 mg  
    
   
   
   
  
 melatonin 3 mg tablet Your last dose was: Your next dose is: Take 1 Tab by mouth nightly as needed. 3 mg  
    
   
   
   
  
 methadone 5 mg/5 mL oral solution Commonly known as:  DOLOPHINE Start taking on:  10/13/2018 Replaces:  methadone 10 mg/mL solution Your last dose was: Your next dose is: Take 115 mL by mouth daily. Max Daily Amount: 115 mg. Last dose on Saturday 10/13/18  
 115 mg  
    
   
   
   
  
 oxyCODONE-acetaminophen 5-325 mg per tablet Commonly known as:  PERCOCET Your last dose was: Your next dose is: Take 1 Tab by mouth every four (4) hours as needed. Max Daily Amount: 6 Tabs. 1 Tab  
    
   
   
   
  
 senna-docusate 8.6-50 mg per tablet Commonly known as:  Inessa Yesenia Your last dose was: Your next dose is: Take 1 Tab by mouth two (2) times a day. 1 Tab CHANGE how you take these medications Instructions Each Dose to Equal  
 Morning Noon Evening Bedtime  
 colchicine 0.6 mg tablet Commonly known as:  COLCRYS What changed:   
- when to take this 
- reasons to take this Your last dose was: Your next dose is: Take 1 Tab by mouth daily. For gout flare 0.6 mg  
    
   
   
   
  
 levothyroxine 88 mcg tablet Commonly known as:  SYNTHROID What changed:  Another medication with the same name was removed. Continue taking this medication, and follow the directions you see here. Your last dose was: Your next dose is: Take 88 mcg by mouth Daily (before breakfast). 88 mcg CONTINUE taking these medications Instructions Each Dose to Equal  
 Morning Noon Evening Bedtime  
 allopurinol 100 mg tablet Commonly known as:  Dock Falter Your last dose was: Your next dose is: Take 1 Tab by mouth daily. 100 mg CHANTIX 1 mg tablet Generic drug:  varenicline Your last dose was: Your next dose is: Take 1 mg by mouth two (2) times daily (after meals). 1 mg  
    
   
   
   
  
 lisinopril 20 mg tablet Commonly known as:  Linda Watts Your last dose was: Your next dose is: Take 20 mg by mouth daily. Indications: hypertension 20 mg  
    
   
   
   
  
 tamsulosin 0.4 mg capsule Commonly known as:  FLOMAX Your last dose was: Your next dose is: Take 0.4 mg by mouth daily. Indications: bladder cancer 0.4 mg  
    
   
   
   
  
  
STOP taking these medications CIPRO 500 mg tablet Generic drug:  ciprofloxacin HCl  
   
  
 clindamycin 300 mg capsule Commonly known as:  CLEOCIN  
   
  
 lidocaine-prilocaine topical cream  
Commonly known as:  EMLA methadone 10 mg/mL solution Commonly known as:  DOLOPHINE Replaced by:  methadone 5 mg/5 mL oral solution  
   
  
 terbinafine HCl 250 mg tablet Commonly known as:  LAMISIL  
   
  
 traMADol 50 mg tablet Commonly known as:  ULTRAM  
   
  
  
  
Where to Get Your Medications Information on where to get these meds will be given to you by the nurse or doctor. ! Ask your nurse or doctor about these medications  
  acetaminophen 325 mg tablet  
 amoxicillin-clavulanate 875-125 mg per tablet  
 colchicine 0.6 mg tablet  
 ibuprofen 600 mg tablet  
 melatonin 3 mg tablet  
 methadone 5 mg/5 mL oral solution  
 oxyCODONE-acetaminophen 5-325 mg per tablet  
 senna-docusate 8.6-50 mg per tablet

## 2018-09-28 NOTE — ED TRIAGE NOTES
TRIAGE NOTE: \" left foot ulcers top of foot and on the side, wound clinic refused to see.  Culture of foot showed positive for staph on 9/12/18\"

## 2018-09-28 NOTE — MR AVS SNAPSHOT
[facility] [address] [facility] [address] 
[phone] Patient: Skinny Styles MRN: [id number] :1965 Visit Information Date & Time Provider Department Dept. Phone Encounter #  
 2018  2:15 PM Harshad [address] [facility] [phone] [id number] Upcoming Health Maintenance Date Due Hepatitis C Screening 1965 Pneumococcal 19-64 Highest Risk (1 of 3 - PCV13) 1983 DTaP/Tdap/Td series (1 - Tdap) 1985 Shingrix Vaccine Age 50> (1 of 2) 2014 FOBT Q 1 YEAR AGE 50-75 2014 Influenza Age 5 to Adult 2018 Allergies as of 2018  Review Complete On: 2018 By: Boni Munoz DO Severity Noted Reaction Type Reactions Sulfamethoxazole-trimethoprim Medium 2016   Side Effect Rash L eye and L hand Ciprofloxacin  10/11/2016    Hives Per pcp records Codeine  2011    Hives Tolerates dilaudid, oxycodone Gabapentin  2018    Hives Lyrica [Pregabalin]  2011    Hives Sulfa (Sulfonamide Antibiotics)  2018    Rash Ultram [Tramadol]  2011    Hives Current Immunizations  Reviewed on 3/15/2018 Name Date Influenza Vaccine 10/1/2017 Not reviewed this visit You Were Diagnosed With   
  
 Codes Comments Foot ulcer with fat layer exposed, left ([facility]    -  Primary ICD-10-CM: H99.561 ICD-9-CM: 707.15 Cellulitis of left lower extremity     ICD-10-CM: L03.116 ICD-9-CM: 590. 6 Vitals BP Pulse Temp Resp Height(growth percentile) Weight(growth percentile) 126/75 77 97.5 °F (36.4 °C) 16 6' 1\" (1.854 m) 228 lb (103.4 kg) SpO2 BMI Smoking Status 95% 30.08 kg/m2 Current Every Day Smoker BMI and BSA Data Body Mass Index Body Surface Area 30.08 kg/m 2 2.31 m 2 Preferred Pharmacy Pharmacy Name Phone [address] [phone] Your Updated Medication List  
  
   
This list is accurate as of 9/28/18  3:14 PM.  Always use your most recent med list.  
  
  
  
  
 allopurinol 100 mg tablet Commonly known as:  Jaunita Phenes Take 1 Tab by mouth daily. CHANTIX 1 mg tablet Generic drug:  varenicline Take 1 mg by mouth two (2) times daily (after meals). CIPRO 500 mg tablet Generic drug:  ciprofloxacin HCl Take 500 mg by mouth two (2) times a day. clindamycin 300 mg capsule Commonly known as:  CLEOCIN Take 300 mg by mouth three (3) times daily. colchicine 0.6 mg tablet Commonly known as:  COLCRYS Take 1 Tab by mouth daily as needed. For gout flare  
  
 levothyroxine 75 mcg tablet Commonly known as:  SYNTHROID Take 1 Tab by mouth Daily (before breakfast). lidocaine-prilocaine topical cream  
Commonly known as:  EMLA Apply to affected area as directed  
  
 lisinopril 20 mg tablet Commonly known as:  Brenda Easley Take 20 mg by mouth daily. Indications: hypertension  
  
 methadone 10 mg/mL solution Commonly known as:  DOLOPHINE Take 115 mg by mouth daily. Indications: Opioid Dependence  
  
 tamsulosin 0.4 mg capsule Commonly known as:  FLOMAX Take 0.4 mg by mouth daily. Indications: bladder cancer  
  
 terbinafine HCl 250 mg tablet Commonly known as:  LAMISIL Take 250 mg by mouth daily. traMADol 50 mg tablet Commonly known as:  ULTRAM  
Take 50 mg by mouth every six (6) hours as needed for Pain. To-Do List   
 10/10/2018 1:00 PM  
  Appointment with Jonny Moore DPM; [facility] WOUND CARE 1 at [facility] OP [address] ([phone]) Patient Instructions You need additional evaluation and treatment for your chronic foot infections that are not improving with your home treatment and antibiotics. Go directly To [facility] and let them know your concerns. They may be able to help you find a provider to help coordinate your care. Introducing \[facility]\"" & HEALTH SERVICES! Dear Yuliet Mccray: Thank you for requesting a [employer] account. Our records indicate that you already have an active [employer] account. You can access your account anytime at [email or web address]. [email or web address] Did you know that you can access your hospital and ER discharge instructions at any time in [employer]? You can also review all of your test results from your hospital stay or ER visit. Additional Information If you have questions, please visit the Frequently Asked Questions section of the [employer] website at [email or web address] Remember, [employer] is NOT to be used for urgent needs. For medical emergencies, dial 911. Now available from your iPhone and Android! Please provide this summary of care documentation to your next provider. Your primary care clinician is listed as Amandeep Banuelos. If you have any questions after today's visit, please call [phone].

## 2018-09-28 NOTE — IP AVS SNAPSHOT
2700 [facility] [address] [place] [address] 
[phone] Patient: Lui Rehman MRN: [id number] :1965 About your hospitalization You were admitted on:  2018 You last received care in the:  [place] [address] You were discharged on:  2018 Why you were hospitalized Your primary diagnosis was: Foot Ulcer (Hcc) Your diagnoses also included:  Hypertension, Stroke (Hcc) Follow-up Information Follow up With Details Comments Contact Info [facility] In 1 day 2986 [facility]  11[address] [facility] [address] 
[phone] Lopez Corral MD In 1 week hospital follow up 9[address] [facility] 7 [address] 
[phone] Trixie Caballero DPM    [address] [facility] [address] [facility] [address] [place] [address] 
[phone] 301 [address] In 1 week follow up with Dr Vipin Lepe at [facility] 1 week post-discharge [facility] 115 [facility] [id number] [phone] Discharge Orders None A check valentine indicates which time of day the medication should be taken. My Medications START taking these medications Instructions Each Dose to Equal  
 Morning Noon Evening Bedtime  
 acetaminophen 325 mg tablet Commonly known as:  TYLENOL Your last dose was: Your next dose is: Take 2 Tabs by mouth every four (4) hours as needed. Indications: Arthritic Pain 650 mg  
    
   
   
   
  
 amoxicillin-clavulanate 875-125 mg per tablet Commonly known as:  AUGMENTIN Your last dose was: Your next dose is: Take 1 Tab by mouth every twelve (12) hours. 1 Tab  
    
   
   
   
  
 ibuprofen 600 mg tablet Commonly known as:  MOTRIN Your last dose was: Your next dose is: Take 1 Tab by mouth every six (6) hours as needed. Take on full stomach  
 600 mg  
    
   
   
   
  
 melatonin 3 mg tablet Your last dose was: Your next dose is: Take 1 Tab by mouth nightly as needed. 3 mg  
    
   
   
   
  
 methadone 5 mg/5 mL oral solution Commonly known as:  DOLOPHINE Start taking on:  10/13/2018 Replaces:  methadone 10 mg/mL solution Your last dose was: Your next dose is: Take 115 mL by mouth daily. Max Daily Amount: 115 mg. Last dose on Saturday 10/13/18  
 115 mg  
    
   
   
   
  
 oxyCODONE-acetaminophen 5-325 mg per tablet Commonly known as:  PERCOCET Your last dose was: Your next dose is: Take 1 Tab by mouth every four (4) hours as needed. Max Daily Amount: 6 Tabs. 1 Tab  
    
   
   
   
  
 senna-docusate 8.6-50 mg per tablet Commonly known as:  Song Alberto Your last dose was: Your next dose is: Take 1 Tab by mouth two (2) times a day. 1 Tab CHANGE how you take these medications Instructions Each Dose to Equal  
 Morning Noon Evening Bedtime  
 colchicine 0.6 mg tablet Commonly known as:  COLCRYS What changed:   
- when to take this 
- reasons to take this Your last dose was: Your next dose is: Take 1 Tab by mouth daily. For gout flare 0.6 mg  
    
   
   
   
  
 levothyroxine 88 mcg tablet Commonly known as:  SYNTHROID What changed:  Another medication with the same name was removed. Continue taking this medication, and follow the directions you see here. Your last dose was: Your next dose is: Take 88 mcg by mouth Daily (before breakfast). 88 mcg CONTINUE taking these medications Instructions Each Dose to Equal  
 Morning Noon Evening Bedtime  
 allopurinol 100 mg tablet Commonly known as:  Mariajose Galindo  
   
 Your last dose was: Your next dose is: Take 1 Tab by mouth daily. 100 mg CHANTIX 1 mg tablet Generic drug:  varenicline Your last dose was: Your next dose is: Take 1 mg by mouth two (2) times daily (after meals). 1 mg  
    
   
   
   
  
 lisinopril 20 mg tablet Commonly known as:  Johnson Peppers Your last dose was: Your next dose is: Take 20 mg by mouth daily. Indications: hypertension 20 mg  
    
   
   
   
  
 tamsulosin 0.4 mg capsule Commonly known as:  FLOMAX Your last dose was: Your next dose is: Take 0.4 mg by mouth daily. Indications: bladder cancer 0.4 mg  
    
   
   
   
  
  
STOP taking these medications CIPRO 500 mg tablet Generic drug:  ciprofloxacin HCl  
   
  
 clindamycin 300 mg capsule Commonly known as:  CLEOCIN  
   
  
 lidocaine-prilocaine topical cream  
Commonly known as:  EMLA  
   
  
 methadone 10 mg/mL solution Commonly known as:  DOLOPHINE Replaced by:  methadone 5 mg/5 mL oral solution  
   
  
 terbinafine HCl 250 mg tablet Commonly known as:  LAMISIL  
   
  
 traMADol 50 mg tablet Commonly known as:  ULTRAM  
   
  
  
  
Where to Get Your Medications Information on where to get these meds will be given to you by the nurse or doctor. ! Ask your nurse or doctor about these medications  
  acetaminophen 325 mg tablet  
 amoxicillin-clavulanate 875-125 mg per tablet  
 colchicine 0.6 mg tablet  
 ibuprofen 600 mg tablet  
 melatonin 3 mg tablet  
 methadone 5 mg/5 mL oral solution  
 oxyCODONE-acetaminophen 5-325 mg per tablet  
 senna-docusate 8.6-50 mg per tablet Opioid Education  Prescription Opioids: What You Need to Know: 
 
Prescription opioids can be used to help relieve moderate-to-severe pain and are often prescribed following a surgery or injury, or for certain health conditions. These medications can be an important part of treatment but also come with serious risks. Opioids are strong pain medicines. Examples include hydrocodone, oxycodone, fentanyl, and morphine. Heroin is an example of an illegal opioid. It is important to work with your health care provider to make sure you are getting the safest, most effective care. WHAT ARE THE RISKS AND SIDE EFFECTS OF OPIOID USE? Prescription opioids carry serious risks of addiction and overdose, especially with prolonged use. An opioid overdose, often marked by slow breathing, can cause sudden death. The use of prescription opioids can have a number of side effects as well, even when taken as directed. · Tolerance-meaning you might need to take more of a medication for the same pain relief · Physical dependence-meaning you have symptoms of withdrawal when the medication is stopped. Withdrawal symptoms can include nausea, sweating, chills, diarrhea, stomach cramps, and muscle aches. Withdrawal can last up to several weeks, depending on which drug you took and how long you took it. · Increased sensitivity to pain · Constipation · Nausea, vomiting, and dry mouth · Sleepiness and dizziness · Confusion · Depression · Low levels of testosterone that can result in lower sex drive, energy, and strength · Itching and sweating RISKS ARE GREATER WITH:      
· History of drug misuse, substance use disorder, or overdose · Mental health conditions (such as depression or anxiety) · Sleep apnea · Older age (72 years or older) · Pregnancy Avoid alcohol while taking prescription opioids. Also, unless specifically advised by your health care provider, medications to avoid include: · Benzodiazepines (such as Xanax or Valium) · Muscle relaxants (such as Soma or Flexeril) · Hypnotics (such as Ambien or Lunesta) · Other prescription opioids KNOW YOUR OPTIONS Talk to your health care provider about ways to manage your pain that don't involve prescription opioids. Some of these options may actually work better and have fewer risks and side effects. Consult your physician before adding or stopping any medications, treatments, or physical activity. Options may include: 
· Pain relievers such as acetaminophen, ibuprofen, and naproxen · Some medications that are also used for depression or seizures · Physical therapy and exercise · Counseling to help patients learn how to cope better with triggers of pain and stress. · Application of heat or cold compress · Massage therapy · Relaxation techniques Be Informed Make sure you know the name of your medication, how much and how often to take it, and its potential risks & side effects. IF YOU ARE PRESCRIBED OPIOIDS FOR PAIN: 
· Never take opioids in greater amounts or more often than prescribed. Remember the goal is not to be pain-free but to manage your pain at a tolerable level. · Follow up with your primary care provider to: · Work together to create a plan on how to manage your pain. · Talk about ways to help manage your pain that don't involve prescription opioids. · Talk about any and all concerns and side effects. · Help prevent misuse and abuse. · Never sell or share prescription opioids · Help prevent misuse and abuse. · Store prescription opioids in a secure place and out of reach of others (this may include visitors, children, friends, and family). · Safely dispose of unused/unwanted prescription opioids: Find your community drug take-back program or your pharmacy mail-back program, or flush them down the toilet, following guidance from the Food and Drug Administration (www.fda.gov/Drugs/ResourcesForYou). · Visit www.cdc.gov/drugoverdose to learn about the risks of opioid abuse and overdose.  
· If you believe you may be struggling with addiction, tell your health care provider and ask for guidance or call [employer] at [phone]-ONPL. Discharge Instructions Patient Discharge Instructions Eryn Liu / [id number] : 1965 Admitted 2018 Discharged: 10/11/2018 Take Home Medications · It is important that you take the medications exactly as they are prescribed. · Do not take other medications without consulting your doctor. · Have prescriptions filled and take medications as directed · If medication causes stomach upset, headache, rash or other abnormal reactions discontinue use and  CALL THE DOCTOR What to do at [facility] care during the postoperative period is an integral part of your surgical treatment program.  It is imperative that these instructions are followed to insure proper healing and to obtain the best results. Elevate your feet six inches above hip level by supporting feet and legs with pillows. Limited swelling is expected. Occasionally, the skin may take on a bruised appearance. There is no cause for alarm. Keep your bandages/cast clean and dry. Wound Care Orders: Do not change right foot dressing!!! Activity: ·  Weight bearing as tolerated________________ ·  Partial weight bearing_____to heel only in surgical shoe left foot__________ ·  Non weight bearing_______________________ CALL THE OFFICE IMMEDIATELY if: · The bandages become overly stained · Your medications do not stop the discomfort · You bump or injury the surgical site · You develop a fever above 100 degrees · You get your dressings wet Report to your surgeon/ doctor's office as scheduled or if you do not have a return appointment call [address] [place] [address] to schedule an appointment fro one week from discharge. [phone] Information obtained by : 
 I understand that if any problems occur once I am at home I am to contact my physician. I understand and acknowledge receipt of the instructions indicated above. Physician's or R.N.'s Signature                                                                  Date/Time Patient or Representative Signature                                                          Date/Time Introducing [facility]! Dear Boyd Roldan: Thank you for requesting a [employer] account. Our records indicate that you already have an active [employer] account. You can access your account anytime at [email or web address] Did you know that you can access your hospital and ER discharge instructions at any time in [employer]? You can also review all of your test results from your hospital stay or ER visit. Additional Information If you have questions, please visit the Frequently Asked Questions section of the [employer] website at [email or web address] Remember, [employer] is NOT to be used for urgent needs. For medical emergencies, dial 911. Now available from your iPhone and Android! Introducing Zachery Dukes As a [facility] patient, I wanted to make you aware of our electronic visit tool called [employer]achery Dukes. [facility] 24/7 allows you to connect within minutes with a medical provider 24 hours a day, seven days a week via a mobile device or tablet or logging into a secure website from your computer. You can access Zachery Dukes from anywhere in the [place].  
 
A virtual visit might be right for you when you have a simple condition and feel like you just dont want to get out of bed, or cant get away from work for an appointment, when your regular [employer] provider is not available (evenings, weekends or holidays), or when youre out of town and need minor care. Electronic visits cost only $49 and if the [employer] 24/7 provider determines a prescription is needed to treat your condition, one can be electronically transmitted to a nearby pharmacy*. Please take a moment to enroll today if you have not already done so. The enrollment process is free and takes just a few minutes. To enroll, please download the [employer] 24/7 katia to your tablet or phone, or visit [email or web address] to enroll on your computer. And, as an [address] [place] [address] patient with a [employer] account, the results of your visits will be scanned into your electronic medical record and your primary care provider will be able to view the scanned results. We urge you to continue to see your regular [employer] provider for your ongoing medical care. And while your primary care provider may not be the one available when you seek a [facility] virtual visit, the peace of mind you get from getting a real diagnosis real time can be priceless. For more information on [facility], view our Frequently Asked Questions (FAQs) at [email or web address]. Sincerely, 
 
Yuridia Kang MD 
Chief Medical Officer Fercho Angela Dyer *:  certain medications cannot be prescribed via [facility] Unresulted Labs-Please follow up with your PCP about these lab tests Order Current Status SUSCEPTIBILITY, AER + ANAEROB In process Providers Seen During Your Hospitalization Provider Specialty Primary office phone Dyan Levy MD Emergency Medicine [phone] Jaswant Rudolph MD Hospitalist [phone] Prabha Walter MD Internal Medicine [phone] Cyril Hickman MD Internal Medicine [phone] Stephania Hills MD Internal Medicine [phone] Your Primary Care Physician (PCP) Primary Care Physician Office Phone Office Fax Olga Love [phone] You are allergic to the following Allergen Reactions Sulfamethoxazole-Trimethoprim Rash L eye and L hand Ciprofloxacin Hives Per pcp records Codeine Hives Tolerates dilaudid, oxycodone Gabapentin Hives Lyrica (Pregabalin) Hives Sulfa (Sulfonamide Antibiotics) Rash Ultram (Tramadol) Hives Recent Documentation Height Weight BMI Smoking Status 1.854 m 103.4 kg 30.08 kg/m2 Current Every Day Smoker Emergency Contacts Name Discharge Info Relation Home Work Mobile Riena Henry DISCHARGE CAREGIVER [3] Other Relative [6] [phone]  [phone] Dallin Valero DISCHARGE CAREGIVER [3] Other Relative [6] [phone] Juan Pablo Burks CAREGIVER [3] Other Relative [6] [phone]    
 Tanja Santoro  Other Relative [6] [phone] Patient Belongings The following personal items are in your possession at time of discharge: 
  Dental Appliances: None  Visual Aid: None             Clothing:  (no personal belongings in PACU) Please provide this summary of care documentation to your next provider. Signatures-by signing, you are acknowledging that this After Visit Summary has been reviewed with you and you have received a copy. Patient Signature:  ____________________________________________________________ Date:  ____________________________________________________________  
  
[facility] Provider Signature:  ____________________________________________________________ Date:  ____________________________________________________________

## 2018-09-28 NOTE — PATIENT INSTRUCTIONS
You need additional evaluation and treatment for your chronic foot infections that are not improving with your home treatment and antibiotics. Go directly To [facility] and let them know your concerns. They may be able to help you find a provider to help coordinate your care.

## 2018-09-28 NOTE — ED PROVIDER NOTES
HPI Comments: 47 y.o. male with past medical history significant for stroke, TIA, and hypertension who presents from an [facility] wound clinic via a private vehicle with chief complaint of ulcers to his left foot. Pt reports he has had an ulcer to the dorsal aspect of his left foot for one year and another on the medial aspect of the left foot that occurred more recently. Pt reports he is being followed by an [facility] wound clinic, but was allegedly denied being seen by the provider. Pt states he went to a [facility] facility where he was referred to the ED for evaluation. Per wife, wound culture from 9/12/18 showed infection with Staph and enterobacteria. Per wife, the pt was started on Clindamycin and Cipro by Vipin Wilkes NP and started taking them on 9/22/18. Pt reports the wounds have become significantly more red and painful over the last two days. Pt states he is concerned about the continued worsening of his wounds despite being on antibiotics and the possibility of losing his foot. Pt denies any fever or chills. There are no other acute medical concerns at this time. Social hx - Tobacco use: current smoker, Alcohol Use: yes    PCP: Tal López MD    Note written by Vidal Cooper, as dictated by Cynthia Xavier MD 7:08 PM.        The history is provided by the patient. No  was used. Past Medical History:   Diagnosis Date    Aneurysm [facility])     (with stroke)    Bladder tumor     Family history of bladder cancer     Gout     Hypercholesterolemia     Hypertension     Lesion of bladder     [facility] [address]     Stroke [facility] [address] 2006    left sided weakness    Thromboembolus [facility] [address]     Thyroid disease     TIA (transient ischemic attack) 2012       Past Surgical History:   Procedure Laterality Date    HX UROLOGICAL  04/20/2018    Cystoscopy, TURBT (greater than 5 cm resected) Dr. Perry Steiner, [facility].     KNEE ARTHROSCP HARV      left knee    TRANSURETHRAL RESEC BLADDER NECK  08/10/2018         Family History:   Problem Relation Age of Onset    Diabetes Father     Diabetes Sister     Diabetes Brother     Cancer Paternal Grandfather      Bladder       Social History     Social History    Marital status:      Spouse name: N/A    Number of children: N/A    Years of education: N/A     Occupational History    Not on file. Social History Main Topics    Smoking status: Current Every Day Smoker     Packs/day: 1.00    Smokeless tobacco: Never Used    Alcohol use 8.4 oz/week     14 Cans of beer per week    Drug use: Yes     Special: Prescription    Sexual activity: Yes     Other Topics Concern    Not on file     Social History Narrative    60year old  male admitted for reported SI in the context of bladder CA, chronic pain, homelessness, and opiod dependence. Pt has been in multiple psychiatric admissions for the same compliants in the last 2 years,         ALLERGIES: Sulfamethoxazole-trimethoprim; Ciprofloxacin; Codeine; Gabapentin; Lyrica [pregabalin]; Sulfa (sulfonamide antibiotics); and Ultram [tramadol]    Review of Systems   Constitutional: Negative for chills and fever. HENT: Negative for ear pain and sore throat. Eyes: Negative for pain. Respiratory: Negative for chest tightness and shortness of breath. Cardiovascular: Negative for chest pain and leg swelling. Gastrointestinal: Negative for abdominal pain, nausea and vomiting. Genitourinary: Negative for dysuria and flank pain. Musculoskeletal: Negative for back pain. Skin: Positive for color change and wound. Negative for rash. Neurological: Negative for headaches. All other systems reviewed and are negative. Vitals:    09/28/18 1624   BP: 157/90   Pulse: 83   Resp: 17   Temp: 98.2 °F (36.8 °C)   SpO2: 95%   Weight: 103.4 kg (228 lb)   Height: 6' 1\" (1.854 m)            Physical Exam   Constitutional: He is oriented to person, place, and time.  He appears well-developed and well-nourished. He appears distressed (mild with foot pain). HENT:   Head: Normocephalic and atraumatic. Nose: Nose normal.   Mouth/Throat: Oropharynx is clear and moist.   Eyes: Conjunctivae and EOM are normal. Pupils are equal, round, and reactive to light. No scleral icterus. Neck: Normal range of motion. Neck supple. No JVD present. No tracheal deviation present. No thyromegaly present. No carotid bruits noted. Cardiovascular: Normal rate, regular rhythm, normal heart sounds and intact distal pulses. Exam reveals no gallop and no friction rub. No murmur heard. Pulmonary/Chest: Effort normal and breath sounds normal. No respiratory distress. He has no wheezes. He has no rales. He exhibits no tenderness. Abdominal: Soft. Bowel sounds are normal. He exhibits no distension and no mass. There is no tenderness. There is no rebound and no guarding. Musculoskeletal: Normal range of motion. He exhibits no edema or tenderness. Lymphadenopathy:     He has no cervical adenopathy. Neurological: He is alert and oriented to person, place, and time. He has normal reflexes. No cranial nerve deficit. Coordination normal.   Skin: Skin is warm and dry. No rash noted. There is a 2 cm wide ulcer on the dorsum of the left foot from the intertriginous part of the 2nd toe into the other two toes. Ulcer goes into the submucosal layer and has surrounding erythema. (see image)    There is a 3 cm wide ulcer on the medial aspect of the left foot. Ulcer goes into the submucosal layer and has marked surrounding erythema. (see image)   Psychiatric: He has a normal mood and affect. His behavior is normal. Judgment and thought content normal.   Nursing note and vitals reviewed.                Note written by Vidal Shi, as dictated by Karie Rees MD 7:08 PM.  MDM  Number of Diagnoses or Management Options  Wound, open, foot with complication, right, initial encounter: new and requires workup     Amount and/or Complexity of Data Reviewed  Clinical lab tests: ordered and reviewed  Tests in the radiology section of CPT®: ordered and reviewed  Decide to obtain previous medical records or to obtain history from someone other than the patient: yes  Review and summarize past medical records: yes  Discuss the patient with other providers: yes    Risk of Complications, Morbidity, and/or Mortality  Presenting problems: high  Diagnostic procedures: high  Management options: high    Patient Progress  Patient progress: stable        ED Course       Procedures    With patient on two different antibiotics with worsening pain and redness, patient sent here for admission and IV antibiotics and evaluation by BS wound management. Labs and Xrays are unremarkable. Consult has been placed with the hospitalist.    CONSULT NOTE:  7:51 PM Emma Liu MD communicated with Dr. Cristy Cedeno, Consult for Hospitalist via [facility] Text. Discussed available diagnostic tests and clinical findings. Dr. Cristy Cedeno will see and admit the pt.    7:52 PM  Patient is being admitted to the hospital.  The results of their tests and reasons for their admission have been discussed with them and/or available family. They convey agreement and understanding for the need to be admitted and for their admission diagnosis. Consultation will be made now with the inpatient physician for hospitalization.

## 2018-09-28 NOTE — PROGRESS NOTES
Patient is a 47 y.o. male presenting with foot pain. The history is provided by the patient and the spouse. Foot Pain   This is a chronic problem. Episode onset: months of ulcer on the top of his left foot and then about 6 weeks of ulcer on the medial aspect of his left foot with increasing redness, deepening and ulcer, and pain and fatigue. The problem occurs constantly. The problem has been gradually worsening. Pertinent negatives include no abdominal pain, no headaches and no shortness of breath. Nothing aggravates the symptoms. Nothing relieves the symptoms. Treatments tried: wound care, abx  The treatment provided no relief. Past Medical History:   Diagnosis Date    Aneurysm [facility])     (with stroke)    Bladder tumor     Family history of bladder cancer     Gout     Hypercholesterolemia     Hypertension     Lesion of bladder     Seizures ([facility]     Stroke ([facility] [facility] 2006    left sided weakness    Thromboembolus ([facility]     Thyroid disease     TIA (transient ischemic attack) 2012        Past Surgical History:   Procedure Laterality Date    HX UROLOGICAL  04/20/2018    Cystoscopy, TURBT (greater than 5 cm resected) Dr. Jhony Chow, [facility].  KNEE ARTHROSCP HARV      left knee    TRANSURETHRAL RESEC BLADDER NECK  08/10/2018         Family History   Problem Relation Age of Onset    Diabetes Father     Diabetes Sister     Diabetes Brother     Cancer Paternal Grandfather      Bladder        Social History     Social History    Marital status:      Spouse name: N/A    Number of children: N/A    Years of education: N/A     Occupational History    Not on file.      Social History Main Topics    Smoking status: Current Every Day Smoker     Packs/day: 1.00    Smokeless tobacco: Never Used    Alcohol use 8.4 oz/week     14 Cans of beer per week    Drug use: Yes     Special: Prescription    Sexual activity: Yes     Other Topics Concern    Not on file     Social History Narrative    60year old  male admitted for reported SI in the context of bladder CA, chronic pain, homelessness, and opiod dependence. Pt has been in multiple psychiatric admissions for the same compliants in the last 2 years,                ALLERGIES: Sulfamethoxazole-trimethoprim; Ciprofloxacin; Codeine; Gabapentin; Lyrica [pregabalin]; Sulfa (sulfonamide antibiotics); and Ultram [tramadol]    Review of Systems   Constitutional: Positive for activity change (fatigue). Respiratory: Negative. Negative for shortness of breath. Gastrointestinal: Negative for abdominal pain. Musculoskeletal: Positive for gait problem (due to increased pain in his left foot from worsening infection). Neurological: Negative for numbness and headaches. Vitals:    09/28/18 1426   BP: 126/75   Pulse: 77   Resp: 16   Temp: 97.5 °F (36.4 °C)   SpO2: 95%   Weight: 228 lb (103.4 kg)   Height: 6' 1\" (1.854 m)       Physical Exam   Constitutional: He is oriented to person, place, and time. He appears well-developed and well-nourished. No distress. HENT:   Head: Normocephalic and atraumatic. Mouth/Throat: Oropharynx is clear and moist. No oropharyngeal exudate. Eyes: Conjunctivae are normal. Right eye exhibits no discharge. Left eye exhibits no discharge. No scleral icterus. Cardiovascular: Normal rate, regular rhythm and normal heart sounds. No murmur heard. Pulmonary/Chest: Effort normal and breath sounds normal. No respiratory distress. He has no wheezes. He has no rales. Musculoskeletal: Normal range of motion. He exhibits edema and tenderness. Left ankle: He exhibits swelling. He exhibits normal range of motion and no ecchymosis. Tenderness. No proximal fibula tenderness found. Feet:    Ulcerations into the subcutaneous tissue without appreciable crepitance but with significant surrounding edema and blanchable erythema. The second toe distal to the ulcer is purple and painful with decreased cap refill. Neurological: He is alert and oriented to person, place, and time. No cranial nerve deficit. Coordination normal.   Skin: Skin is warm. No rash noted. He is not diaphoretic. There is erythema. Psychiatric: He has a normal mood and affect. His behavior is normal. Judgment and thought content normal.   Nursing note and vitals reviewed. MDM    Procedures                         ICD-10-CM ICD-9-CM    1. Foot ulcer with fat layer exposed, left ([facility] L97.522 707.15    2. Cellulitis of left lower extremity L03.116 682.6      No orders of the defined types were placed in this encounter. The patients condition was discussed with the patient and they understand. The patient is to follow up with primary care doctor ,If signs and symptoms become worse the pt is to go to the ER. The patient is to take medications as prescribed.

## 2018-09-29 LAB
ANION GAP SERPL CALC-SCNC: 8 MMOL/L (ref 5–15)
BUN SERPL-MCNC: 7 MG/DL (ref 6–20)
BUN/CREAT SERPL: 9 (ref 12–20)
CALCIUM SERPL-MCNC: 8.1 MG/DL (ref 8.5–10.1)
CHLORIDE SERPL-SCNC: 108 MMOL/L (ref 97–108)
CO2 SERPL-SCNC: 27 MMOL/L (ref 21–32)
CREAT SERPL-MCNC: 0.74 MG/DL (ref 0.7–1.3)
GLUCOSE SERPL-MCNC: 107 MG/DL (ref 65–100)
POTASSIUM SERPL-SCNC: 3.6 MMOL/L (ref 3.5–5.1)
SODIUM SERPL-SCNC: 143 MMOL/L (ref 136–145)

## 2018-09-29 PROCEDURE — 87153 DNA/RNA SEQUENCING: CPT | Performed by: INTERNAL MEDICINE

## 2018-09-29 PROCEDURE — 65270000029 HC RM PRIVATE

## 2018-09-29 PROCEDURE — 36415 COLL VENOUS BLD VENIPUNCTURE: CPT | Performed by: HOSPITALIST

## 2018-09-29 PROCEDURE — 87205 SMEAR GRAM STAIN: CPT | Performed by: HOSPITALIST

## 2018-09-29 PROCEDURE — 87077 CULTURE AEROBIC IDENTIFY: CPT | Performed by: INTERNAL MEDICINE

## 2018-09-29 PROCEDURE — 74011250637 HC RX REV CODE- 250/637: Performed by: HOSPITALIST

## 2018-09-29 PROCEDURE — 80048 BASIC METABOLIC PNL TOTAL CA: CPT | Performed by: HOSPITALIST

## 2018-09-29 PROCEDURE — 87186 SC STD MICRODIL/AGAR DIL: CPT | Performed by: INTERNAL MEDICINE

## 2018-09-29 PROCEDURE — 87077 CULTURE AEROBIC IDENTIFY: CPT | Performed by: HOSPITALIST

## 2018-09-29 PROCEDURE — 74011250636 HC RX REV CODE- 250/636: Performed by: HOSPITALIST

## 2018-09-29 RX ORDER — METHADONE HYDROCHLORIDE 5 MG/5ML
115 SOLUTION ORAL DAILY
Status: COMPLETED | OUTPATIENT
Start: 2018-09-29 | End: 2018-10-05

## 2018-09-29 RX ORDER — VANCOMYCIN 1.75 GRAM/500 ML IN 0.9 % SODIUM CHLORIDE INTRAVENOUS
1750 EVERY 12 HOURS
Status: DISCONTINUED | OUTPATIENT
Start: 2018-09-29 | End: 2018-10-12

## 2018-09-29 RX ADMIN — LISINOPRIL 20 MG: 20 TABLET ORAL at 09:02

## 2018-09-29 RX ADMIN — VANCOMYCIN HYDROCHLORIDE 1750 MG: 10 INJECTION, POWDER, LYOPHILIZED, FOR SOLUTION INTRAVENOUS at 13:28

## 2018-09-29 RX ADMIN — VANCOMYCIN HYDROCHLORIDE 2000 MG: 10 INJECTION, POWDER, LYOPHILIZED, FOR SOLUTION INTRAVENOUS at 00:35

## 2018-09-29 RX ADMIN — OXYCODONE AND ACETAMINOPHEN 1 TABLET: 5; 325 TABLET ORAL at 11:22

## 2018-09-29 RX ADMIN — OXYCODONE AND ACETAMINOPHEN 1 TABLET: 5; 325 TABLET ORAL at 03:22

## 2018-09-29 RX ADMIN — ACETAMINOPHEN 650 MG: 325 TABLET ORAL at 13:28

## 2018-09-29 RX ADMIN — METHADONE HYDROCHLORIDE 115 MG: 5 SOLUTION ORAL at 09:02

## 2018-09-29 RX ADMIN — OXYCODONE AND ACETAMINOPHEN 1 TABLET: 5; 325 TABLET ORAL at 15:40

## 2018-09-29 RX ADMIN — ENOXAPARIN SODIUM 40 MG: 40 INJECTION SUBCUTANEOUS at 09:02

## 2018-09-29 RX ADMIN — OXYCODONE AND ACETAMINOPHEN 1 TABLET: 5; 325 TABLET ORAL at 07:31

## 2018-09-29 RX ADMIN — ACETAMINOPHEN 650 MG: 325 TABLET ORAL at 20:38

## 2018-09-29 RX ADMIN — Medication 10 ML: at 15:40

## 2018-09-29 RX ADMIN — OXYCODONE AND ACETAMINOPHEN 1 TABLET: 5; 325 TABLET ORAL at 23:20

## 2018-09-29 RX ADMIN — LEVOTHYROXINE SODIUM 88 MCG: 88 TABLET ORAL at 07:31

## 2018-09-29 RX ADMIN — OXYCODONE AND ACETAMINOPHEN 1 TABLET: 5; 325 TABLET ORAL at 19:22

## 2018-09-29 RX ADMIN — ACETAMINOPHEN 650 MG: 325 TABLET ORAL at 05:23

## 2018-09-29 RX ADMIN — TAMSULOSIN HYDROCHLORIDE 0.4 MG: 0.4 CAPSULE ORAL at 09:02

## 2018-09-29 RX ADMIN — ALLOPURINOL 100 MG: 100 TABLET ORAL at 09:02

## 2018-09-29 RX ADMIN — Medication 10 ML: at 00:38

## 2018-09-29 NOTE — ED NOTES
TRANSFER - OUT REPORT:    Verbal report given to nilson LAND (name) on Josef June  being transferred to  (unit) for routine progression of care       Report consisted of patients Situation, Background, Assessment and   Recommendations(SBAR). Information from the following report(s) SBAR, ED Summary and Recent Results was reviewed with the receiving nurse. Lines:   Peripheral IV 09/28/18 Right Arm (Active)   Site Assessment Clean, dry, & intact 9/28/2018  5:04 PM   Phlebitis Assessment 0 9/28/2018  5:04 PM   Infiltration Assessment 0 9/28/2018  5:04 PM   Dressing Status Clean, dry, & intact 9/28/2018  5:04 PM   Dressing Type 4 X 4;Transparent 9/28/2018  5:04 PM   Hub Color/Line Status Pink;Flushed;Patent 9/28/2018  5:04 PM   Alcohol Cap Used No 9/28/2018  5:04 PM        Opportunity for questions and clarification was provided.       Patient transported with:   [place]

## 2018-09-29 NOTE — PROGRESS NOTES
Admission Medication Reconciliation:    Information obtained from: Patient, Rx Query    Significant PMH/Disease States:   Past Medical History:   Diagnosis Date    Aneurysm ([facility] [address]     (with stroke)    Bladder tumor     Family history of bladder cancer     Gout     Hypercholesterolemia     Hypertension     Lesion of bladder     Seizures ([facility] [address] [facility]     Stroke ([facility] [address] [facility] 2006    left sided weakness    Thromboembolus ([facility] [facility]     Thyroid disease     TIA (transient ischemic attack) 2012       Chief Complaint for this Admission:    Chief Complaint   Patient presents with    Foot Ulcer       Allergies:  Sulfamethoxazole-trimethoprim; Ciprofloxacin; Codeine; Gabapentin; Lyrica [pregabalin]; Sulfa (sulfonamide antibiotics); and Ultram [tramadol]    Prior to Admission Medications:   Prior to Admission Medications   Prescriptions Last Dose Informant Patient Reported? Taking?   allopurinol (ZYLOPRIM) 100 mg tablet  at [address] Self No Yes   Sig: Take 1 Tab by mouth daily. ciprofloxacin HCl (CIPRO) 500 mg tablet 9/28/2018 at AM  Yes Yes   Sig: Take 500 mg by mouth two (2) times a day. Start 9/22 x 10 days   clindamycin (CLEOCIN) 300 mg capsule 9/28/2018 at AM  Yes Yes   Sig: Take 300 mg by mouth three (3) times daily. Start 9/22 x 10 days   colchicine (COLCRYS) 0.6 mg tablet 9/28/2018 at x 2 more days for flare Self No Yes   Sig: Take 1 Tab by mouth daily as needed. For gout flare   levothyroxine (SYNTHROID) 88 mcg tablet 9/28/2018 at Unknown time  Yes Yes   Sig: Take 88 mcg by mouth Daily (before breakfast). lidocaine-prilocaine (EMLA) topical cream 9/28/2018 at Unknown time  Yes Yes   Sig: Apply  to affected area daily. Apply to foot daily with dressing change. lisinopril (PRINIVIL, ZESTRIL) 20 mg tablet 9/28/2018 at Unknown time  Yes Yes   Sig: Take 20 mg by mouth daily.  Indications: hypertension   methadone (DOLOPHINE) 10 mg/mL solution 9/28/2018 at Unknown time  Yes Yes   Sig: Take 115 mg by mouth daily. Indications: Opioid Dependence   tamsulosin (FLOMAX) 0.4 mg capsule 9/28/2018 at Unknown time  Yes Yes   Sig: Take 0.4 mg by mouth daily. Indications: bladder cancer   terbinafine HCl (LAMISIL) 250 mg tablet 9/28/2018 at Unknown time  Yes Yes   Sig: Take 250 mg by mouth daily. traMADol (ULTRAM) 50 mg tablet   Yes Yes   Sig: Take 50 mg by mouth every six (6) hours as needed for Pain.   varenicline (CHANTIX) 1 mg tablet 9/28/2018 at AM  Yes Yes   Sig: Take 1 mg by mouth two (2) times daily (after meals). Facility-Administered Medications: None         Comments/Recommendations:     Spoke with patient regarding allergies and PTA medications. 1) Reviewed and confirmed allergies. 2) Updated PTA medication list. Modified allopurinol (changed from 300 mg daily to 100 mg daily per patient, although last filled for 300 mg - currently on hold, resume once colchicine complete), levothyroxine (changed from 75 mcg daily to 88 mcg daily), and Emla (changed from apply as directed to apply daily with dressing change). Of note, patient started Cipro and clindamycin 9/22 x 10 day courses. Patient reports he had morning doses of each medication today. For methadone doses, patient goes to [facility]. His direct report is Sharron Virk at [phone]. If Yasmeen Olguin is unavailable, information can be confirmed at [phone]. Last dose information listed in table above.       Alex Garcia, AungD

## 2018-09-29 NOTE — PROGRESS NOTES
Pharmacist Note - Vancomycin Dosing    Consult provided for this 47 y.o. male for indication of infected foot ulcer. Antibiotic regimen(s): monotherapy    Recent Labs      18   1701   WBC  10.5   CREA  0.79   BUN  6     Frequency of BMP: daily  Height: 185.4 cm  Weight: 103.4 kg  Est CrCl: >120 ml/min; Temp (24hrs), Av.4 °F (36.9 °C), Min:97.5 °F (36.4 °C), Max:99 °F (37.2 °C)    Cultures:   wound    Goal trough = 10 - 15 mcg/mL    Therapy will be initiated with a loading dose of 2000 mg IV x 1 to be followed by a maintenance dose of 1750 mg IV every 12 hours. Pharmacy to follow patient daily and order levels / make dose adjustments as appropriate.

## 2018-09-29 NOTE — PROGRESS NOTES
Hospitalist Progress Note  James Gentile MD  Answering service: [phone] from in house phone  Cell: [phone]      Date of Service:  2018  NAME:  Giselle Holden  :  1965  MRN:  [id number]      Admission Summary:   A 47 y.o man with a history of CVA, HTN, chronic pain, chronic left foot ulcer who presents with worsening of his ulcers. He reports an ulcer on the dorsum of his left foot that is reportedly the result of trauma that has been present for about a year, and a few weeks ago developed another ulcer on the medial aspect of the foot. He reports following in a wound care clinic and recently being prescribed ciprofloxacin and clindamycin which did not help. For the past week, the ulcers have been increasingly more painful and he noted yellow drainage from them. He denies fevers.     Interval history / Subjective:   Left foot pain 9/10,      Assessment & Plan:     L foot ulcer infected (POA)   -?traumatic, appear mildly infected with surrounding cellulitic changes at medial side of left foot and dorsal side anteriorly foot  - not improve with clindamycin and ciprofloxacin PO.  -follow up wound cx   -IV vancomycin  -wound care consulted  -Podiatrist is consulted     HTN  -continue lisinopril and monitor BP     Chronic pain  -on  methadone  -PRN Percocet for acute pain     Gout  -stable, Allopurinol      Bladder cancer  -s/p surgery in 2018  -stable     Hypothyroidism  -synthroid     Hx of hemorrhagic stork with left sided hemiparesis   -patient uses wheelchair and walker.    -continue supportive care      Code status: Full Code  DVT prophylaxis: SCD    Care Plan discussed with: Patient/Family and Nurse  Disposition: TBD     Hospital Problems  Date Reviewed: 2018          Codes Class Noted POA    * (Principal)Foot ulcer ([facility] [address] ICD-10-CM: C64.304  ICD-9-CM: 707.15  2018 Unknown        Stroke ([facility] [address] ICD-10-CM: I63.9  ICD-9-CM: 434.91  3/11/2011 Yes        Hypertension ICD-10-CM: I10  ICD-9-CM: 401.9  3/11/2011 Yes              Vital Signs:    Last 24hrs VS reviewed since prior progress note. Most recent are:  Visit Vitals    /85 (BP 1 Location: Right arm, BP Patient Position: At rest)    Pulse (!) 55    Temp 98 °F (36.7 °C)    Resp 14    Ht 6' 1\" (1.854 m)    Wt 103.4 kg (228 lb)    SpO2 96%    BMI 30.08 kg/m2         Intake/Output Summary (Last 24 hours) at 09/29/18 0801  Last data filed at 09/29/18 0731   Gross per 24 hour   Intake              820 ml   Output             1975 ml   Net            -1155 ml        Physical Examination:             Constitutional:  No acute distress, cooperative, pleasant    ENT:  Oral mucous moist, oropharynx benign. Neck supple,    Resp:  CTA bilaterally. No wheezing/rhonchi/rales. No accessory muscle use   CV:  Regular rhythm, normal rate, no murmurs, gallops, rubs    GI:  Soft, non distended, non tender. normoactive bowel sounds, no hepatosplenomegaly     Musculoskeletal:  No edema, left foot ulcer wound with some discharge and surrounding erythema    Neurologic:  Conscious and well oriented, right extremities 5/5, LUE 1/5, LLE 4/5     Psych:  Good insight, Not anxious nor agitated. Data Review:    Review and/or order of clinical lab test      Labs:     Recent Labs      09/28/18   1701   WBC  10.5   HGB  13.0   HCT  39.1   PLT  238     Recent Labs      09/29/18   0332  09/28/18   1701   NA  143  140   K  3.6  3.5   CL  108  104   CO2  27  29   BUN  7  6   CREA  0.74  0.79   GLU  107*  97   CA  8.1*  8.4*     Recent Labs      09/28/18   1701   SGOT  14*   ALT  21   AP  143*   TBILI  0.6   TP  6.9   ALB  4.0   GLOB  2.9     No results for input(s): INR, PTP, APTT in the last 72 hours. No lab exists for component: INREXT   No results for input(s): FE, TIBC, PSAT, FERR in the last 72 hours.    Lab Results   Component Value Date/Time    Folate 8.6 12/13/2012 03:20 AM      No results for input(s): PH, PCO2, PO2 in the last 72 hours. No results for input(s): CPK, CKNDX, TROIQ in the last 72 hours.     No lab exists for component: CPKMB  Lab Results   Component Value Date/Time    Cholesterol, total 152 12/13/2012 03:30 AM    HDL Cholesterol 26 12/13/2012 03:30 AM    LDL, calculated 92.2 12/13/2012 03:30 AM    Triglyceride 169 (H) 12/13/2012 03:30 AM    CHOL/HDL Ratio 5.8 (H) 12/13/2012 03:30 AM     Lab Results   Component Value Date/Time    Glucose (POC) 150 (H) 03/19/2018 11:45 AM    Glucose (POC) 123 (H) 03/18/2018 08:55 PM    Glucose (POC) 96 03/16/2018 04:58 PM    Glucose (POC) 116 (H) 12/12/2012 12:42 PM    Glucose (POC) 99 12/11/2012 11:27 PM     Lab Results   Component Value Date/Time    Color YELLOW/STRAW 07/31/2018 05:52 PM    Appearance CLEAR 07/31/2018 05:52 PM    Specific gravity 1.006 07/31/2018 05:52 PM    pH (UA) 6.0 07/31/2018 05:52 PM    Protein NEGATIVE  07/31/2018 05:52 PM    Glucose NEGATIVE  07/31/2018 05:52 PM    Ketone NEGATIVE  07/31/2018 05:52 PM    Bilirubin NEGATIVE  07/31/2018 05:52 PM    Urobilinogen 0.2 07/31/2018 05:52 PM    Nitrites NEGATIVE  07/31/2018 05:52 PM    Leukocyte Esterase NEGATIVE  07/31/2018 05:52 PM    Epithelial cells FEW 07/31/2018 05:52 PM    Bacteria NEGATIVE  07/31/2018 05:52 PM    WBC 0-4 07/31/2018 05:52 PM    RBC 0-5 07/31/2018 05:52 PM         Medications Reviewed:     Current Facility-Administered Medications   Medication Dose Route Frequency    vancomycin (VANCOCIN) 1750 mg in  ml infusion  1,750 mg IntraVENous Q12H    methadone (DOLOPHINE) 5 mg/5 mL oral solution 115 mg  115 mg Oral DAILY    allopurinol (ZYLOPRIM) tablet 100 mg  100 mg Oral DAILY    levothyroxine (SYNTHROID) tablet 88 mcg  88 mcg Oral ACB    lisinopril (PRINIVIL, ZESTRIL) tablet 20 mg  20 mg Oral DAILY    tamsulosin (FLOMAX) capsule 0.4 mg  0.4 mg Oral DAILY    sodium chloride (NS) flush 5-10 mL  5-10 mL IntraVENous Q8H    sodium chloride (NS) flush 5-10 mL  5-10 mL IntraVENous PRN    acetaminophen (TYLENOL) tablet 650 mg  650 mg Oral Q4H PRN    enoxaparin (LOVENOX) injection 40 mg  40 mg SubCUTAneous Q24H    ondansetron (ZOFRAN) injection 4 mg  4 mg IntraVENous Q4H PRN    oxyCODONE-acetaminophen (PERCOCET) 5-325 mg per tablet 1 Tab  1 Tab Oral Q4H PRN    Vancomycin Pharmacy to Dose   Other Rx Dosing/Monitoring     ______________________________________________________________________  EXPECTED LENGTH OF STAY: - - -  ACTUAL LENGTH OF STAY:          1                 Jessica Nurse, MD

## 2018-09-29 NOTE — H&P
HISTORY AND PHYSICAL      PCP: Mariluz Gutierrez MD  History source: the patient      CC: left foot ulcers      HPI: 47 y.o man w/ a history of CVA, HTN, chronic pain, chronic left foot ulcer who presents with worsening of his ulcers. He reports an ulcer on the dorsum of his left foot that is reportedly the result of trauma that has been present for about a year, and a few weeks ago developed another ulcer on the medial aspect of the foot. He reports following in a wound care clinic and recently being prescribed ciprofloxacin and clindamycin which did not help. For the past week, the ulcers have been increasingly more painful and he noted yellow drainage from them. He denies fevers. PMH/PSH:  Past Medical History:   Diagnosis Date    Aneurysm [facility])     (with stroke)    Bladder tumor     Family history of bladder cancer     Gout     Hypercholesterolemia     Hypertension     Lesion of bladder     Seizures ([facility] [address]     Stroke ([facility] [address] 2006    left sided weakness    Thromboembolus ([facility] [address]     Thyroid disease     TIA (transient ischemic attack) 2012     Past Surgical History:   Procedure Laterality Date    HX UROLOGICAL  04/20/2018    Cystoscopy, TURBT (greater than 5 cm resected) Dr. Wynelle Lanes, [facility] [[email or web address]  KNEE ARTHROSCP HARV      left knee    TRANSURETHRAL RESEC BLADDER NECK  08/10/2018       Home meds:   Prior to Admission medications    Medication Sig Start Date End Date Taking? Authorizing Provider   ciprofloxacin HCl (CIPRO) 500 mg tablet Take 500 mg by mouth two (2) times a day. Start 9/22 x 10 days   Yes Historical Provider   clindamycin (CLEOCIN) 300 mg capsule Take 300 mg by mouth three (3) times daily. Start 9/22 x 10 days   Yes Historical Provider   traMADol (ULTRAM) 50 mg tablet Take 50 mg by mouth every six (6) hours as needed for Pain. Yes Historical Provider   terbinafine HCl (LAMISIL) 250 mg tablet Take 250 mg by mouth daily.    Yes Historical Provider   varenicline (CHANTIX) 1 mg tablet Take 1 mg by mouth two (2) times daily (after meals). Yes Historical Provider   levothyroxine (SYNTHROID) 88 mcg tablet Take 88 mcg by mouth Daily (before breakfast). Yes Historical Provider   methadone (DOLOPHINE) 10 mg/mL solution Take 115 mg by mouth daily. Indications: Opioid Dependence   Yes Historical Provider   lidocaine-prilocaine (EMLA) topical cream Apply  to affected area daily. Apply to foot daily with dressing change. Yes Historical Provider   lisinopril (PRINIVIL, ZESTRIL) 20 mg tablet Take 20 mg by mouth daily. Indications: hypertension   Yes Historical Provider   tamsulosin (FLOMAX) 0.4 mg capsule Take 0.4 mg by mouth daily. Indications: bladder cancer   Yes Historical Provider   colchicine (COLCRYS) 0.6 mg tablet Take 1 Tab by mouth daily as needed. For gout flare 1/12/18  Yes Doris Adame MD   allopurinol (ZYLOPRIM) 100 mg tablet Take 1 Tab by mouth daily. 1/12/18  Yes Doris Adame MD       Allergies: Allergies   Allergen Reactions    Sulfamethoxazole-Trimethoprim Rash     L eye and L hand    Ciprofloxacin Hives     Per pcp records    Codeine Hives     Tolerates dilaudid, oxycodone    Gabapentin Hives    Lyrica [Pregabalin] Hives    Sulfa (Sulfonamide Antibiotics) Rash    Ultram [Tramadol] Hives       FH:  Family History   Problem Relation Age of Onset    Diabetes Father     Diabetes Sister     Diabetes Brother     Cancer Paternal Grandfather      Bladder       SH:  Social History   Substance Use Topics    Smoking status: Current Every Day Smoker     Packs/day: 1.00    Smokeless tobacco: Never Used    Alcohol use 8.4 oz/week     14 Cans of beer per week       ROS: A comprehensive review of systems was negative except for that written in the HPI.       PHYSICAL EXAM:  Visit Vitals    BP (!) 159/92 (BP 1 Location: Right arm, BP Patient Position: At rest)    Pulse 65    Temp 98.7 °F (37.1 °C)    Resp 16    Ht 6' 1\" (1.854 m)    Wt 103.4 kg (228 lb)    SpO2 98%    BMI 30.08 kg/m2       Gen: NAD, non-toxic  HEENT: anicteric sclerae, normal conjunctiva, oropharynx clear, MM moist  Neck: supple, trachea midline, no adenopathy  Heart: RRR, no MRG, no JVD, no peripheral edema  Lungs: CTA b/l, non-labored respirations  Abd: soft, NT, ND, BS+, no organomegaly  Extr/skin: warm, L foot with 2 ~1' ulcers one on the dorsum of the foot and medial aspect with small lety of yellow drainage, mild surrounding erythema, tender to palpation  Neuro: CN II-XII grossly intact, normal speech, left-sided weakness  Psych: normal mood, appropriate affect      Labs/Imaging:  Recent Results (from the past 24 hour(s))   CBC WITH AUTOMATED DIFF    Collection Time: 09/28/18  5:01 PM   Result Value Ref Range    WBC 10.5 4.1 - 11.1 K/uL    RBC 4.48 4.10 - 5.70 M/uL    HGB 13.0 12.1 - 17.0 g/dL    HCT 39.1 36.6 - 50.3 %    MCV 87.3 80.0 - 99.0 FL    MCH 29.0 26.0 - 34.0 PG    MCHC 33.2 30.0 - 36.5 g/dL    RDW 16.0 (H) 11.5 - 14.5 %    PLATELET 345 868 - 679 K/uL    MPV 10.2 8.9 - 12.9 FL    NRBC 0.0 0  WBC    ABSOLUTE NRBC 0.00 0.00 - 0.01 K/uL    NEUTROPHILS 54 32 - 75 %    LYMPHOCYTES 27 12 - 49 %    MONOCYTES 9 5 - 13 %    EOSINOPHILS 8 (H) 0 - 7 %    BASOPHILS 1 0 - 1 %    IMMATURE GRANULOCYTES 1 (H) 0.0 - 0.5 %    ABS. NEUTROPHILS 5.7 1.8 - 8.0 K/UL    ABS. LYMPHOCYTES 2.8 0.8 - 3.5 K/UL    ABS. MONOCYTES 1.0 0.0 - 1.0 K/UL    ABS. EOSINOPHILS 0.8 (H) 0.0 - 0.4 K/UL    ABS. BASOPHILS 0.1 0.0 - 0.1 K/UL    ABS. IMM.  GRANS. 0.1 (H) 0.00 - 0.04 K/UL    DF AUTOMATED     METABOLIC PANEL, COMPREHENSIVE    Collection Time: 09/28/18  5:01 PM   Result Value Ref Range    Sodium 140 136 - 145 mmol/L    Potassium 3.5 3.5 - 5.1 mmol/L    Chloride 104 97 - 108 mmol/L    CO2 29 21 - 32 mmol/L    Anion gap 7 5 - 15 mmol/L    Glucose 97 65 - 100 mg/dL    BUN 6 6 - 20 MG/DL    Creatinine 0.79 0.70 - 1.30 MG/DL    BUN/Creatinine ratio 8 (L) 12 - 20      GFR est AA >60 >60 ml/min/1.73m2    GFR est non-AA >60 >60 ml/min/1.73m2    Calcium 8.4 (L) 8.5 - 10.1 MG/DL    Bilirubin, total 0.6 0.2 - 1.0 MG/DL    ALT (SGPT) 21 12 - 78 U/L    AST (SGOT) 14 (L) 15 - 37 U/L    Alk. phosphatase 143 (H) 45 - 117 U/L    Protein, total 6.9 6.4 - 8.2 g/dL    Albumin 4.0 3.5 - 5.0 g/dL    Globulin 2.9 2.0 - 4.0 g/dL    A-G Ratio 1.4 1.1 - 2.2     SAMPLES BEING HELD    Collection Time: 09/28/18  5:01 PM   Result Value Ref Range    SAMPLES BEING HELD 1RED 1BLU     COMMENT        Add-on orders for these samples will be processed based on acceptable specimen integrity and analyte stability, which may vary by analyte. Recent Labs      09/28/18   1701   WBC  10.5   HGB  13.0   HCT  39.1   PLT  238     Recent Labs      09/28/18   1701   NA  140   K  3.5   CL  104   CO2  29   BUN  6   CREA  0.79   GLU  97   CA  8.4*     Recent Labs      09/28/18   1701   SGOT  14*   ALT  21   AP  143*   TBILI  0.6   TP  6.9   ALB  4.0   GLOB  2.9       No results for input(s): CPK, CKNDX, TROIQ in the last 72 hours. No lab exists for component: CPKMB    No results for input(s): INR, PTP, APTT in the last 72 hours. No lab exists for component: INREXT     No results for input(s): PH, PCO2, PO2 in the last 72 hours. Xr Foot Lt Min 3 V    Result Date: 9/28/2018  IMPRESSION:  Soft tissue swelling along the dorsum of the foot. .           Assessment & Plan:     L foot ulcers: (POA) ? traumatic, appear mildly infected with surrounding cellulitic changes. No SIRS.  Did not improve with clindamycin and ciprofloxacin PO.  -obtain wound cultures  -IV vancomycin  -consult podiatry, wound care    HTN:  -continue home meds    Chronic pain:  -continue methadone  -PRN Percocet for acute pain    Gout    Bladder cancer    Hypothyroidism    History of CVA    DVT ppx: sq Lovenox  Code status: full  Disposition: home when ready    Signed By: Huang Larry MD     September 28, 2018

## 2018-09-30 LAB
ALBUMIN SERPL-MCNC: 3 G/DL (ref 3.5–5)
ALBUMIN/GLOB SERPL: 1 {RATIO} (ref 1.1–2.2)
ALP SERPL-CCNC: 145 U/L (ref 45–117)
ALT SERPL-CCNC: 17 U/L (ref 12–78)
ANION GAP SERPL CALC-SCNC: 9 MMOL/L (ref 5–15)
AST SERPL-CCNC: 13 U/L (ref 15–37)
BILIRUB SERPL-MCNC: 0.3 MG/DL (ref 0.2–1)
BUN SERPL-MCNC: 7 MG/DL (ref 6–20)
BUN/CREAT SERPL: 11 (ref 12–20)
CALCIUM SERPL-MCNC: 7.9 MG/DL (ref 8.5–10.1)
CHLORIDE SERPL-SCNC: 107 MMOL/L (ref 97–108)
CO2 SERPL-SCNC: 26 MMOL/L (ref 21–32)
CREAT SERPL-MCNC: 0.66 MG/DL (ref 0.7–1.3)
ERYTHROCYTE [DISTWIDTH] IN BLOOD BY AUTOMATED COUNT: 15.8 % (ref 11.5–14.5)
GLOBULIN SER CALC-MCNC: 3.1 G/DL (ref 2–4)
GLUCOSE SERPL-MCNC: 110 MG/DL (ref 65–100)
HCT VFR BLD AUTO: 37.3 % (ref 36.6–50.3)
HGB BLD-MCNC: 12 G/DL (ref 12.1–17)
MCH RBC QN AUTO: 28.5 PG (ref 26–34)
MCHC RBC AUTO-ENTMCNC: 32.2 G/DL (ref 30–36.5)
MCV RBC AUTO: 88.6 FL (ref 80–99)
NRBC # BLD: 0 K/UL (ref 0–0.01)
NRBC BLD-RTO: 0 PER 100 WBC
PLATELET # BLD AUTO: 190 K/UL (ref 150–400)
PMV BLD AUTO: 11.1 FL (ref 8.9–12.9)
POTASSIUM SERPL-SCNC: 4 MMOL/L (ref 3.5–5.1)
PROT SERPL-MCNC: 6.1 G/DL (ref 6.4–8.2)
RBC # BLD AUTO: 4.21 M/UL (ref 4.1–5.7)
SODIUM SERPL-SCNC: 142 MMOL/L (ref 136–145)
WBC # BLD AUTO: 7.3 K/UL (ref 4.1–11.1)

## 2018-09-30 PROCEDURE — 80053 COMPREHEN METABOLIC PANEL: CPT | Performed by: HOSPITALIST

## 2018-09-30 PROCEDURE — 74011250637 HC RX REV CODE- 250/637: Performed by: HOSPITALIST

## 2018-09-30 PROCEDURE — 36415 COLL VENOUS BLD VENIPUNCTURE: CPT | Performed by: HOSPITALIST

## 2018-09-30 PROCEDURE — 74011250636 HC RX REV CODE- 250/636: Performed by: HOSPITALIST

## 2018-09-30 PROCEDURE — 85027 COMPLETE CBC AUTOMATED: CPT | Performed by: HOSPITALIST

## 2018-09-30 PROCEDURE — 65270000029 HC RM PRIVATE

## 2018-09-30 RX ORDER — ZOLPIDEM TARTRATE 5 MG/1
5 TABLET ORAL
Status: DISCONTINUED | OUTPATIENT
Start: 2018-09-30 | End: 2018-10-12 | Stop reason: HOSPADM

## 2018-09-30 RX ADMIN — OXYCODONE AND ACETAMINOPHEN 1 TABLET: 5; 325 TABLET ORAL at 15:58

## 2018-09-30 RX ADMIN — OXYCODONE AND ACETAMINOPHEN 1 TABLET: 5; 325 TABLET ORAL at 11:51

## 2018-09-30 RX ADMIN — OXYCODONE AND ACETAMINOPHEN 1 TABLET: 5; 325 TABLET ORAL at 07:44

## 2018-09-30 RX ADMIN — ACETAMINOPHEN 650 MG: 325 TABLET ORAL at 01:15

## 2018-09-30 RX ADMIN — ACETAMINOPHEN 650 MG: 325 TABLET ORAL at 10:03

## 2018-09-30 RX ADMIN — LEVOTHYROXINE SODIUM 88 MCG: 88 TABLET ORAL at 06:57

## 2018-09-30 RX ADMIN — ACETAMINOPHEN 650 MG: 325 TABLET ORAL at 22:02

## 2018-09-30 RX ADMIN — ALLOPURINOL 100 MG: 100 TABLET ORAL at 10:03

## 2018-09-30 RX ADMIN — ACETAMINOPHEN 650 MG: 325 TABLET ORAL at 14:18

## 2018-09-30 RX ADMIN — TAMSULOSIN HYDROCHLORIDE 0.4 MG: 0.4 CAPSULE ORAL at 10:03

## 2018-09-30 RX ADMIN — OXYCODONE AND ACETAMINOPHEN 1 TABLET: 5; 325 TABLET ORAL at 03:38

## 2018-09-30 RX ADMIN — VANCOMYCIN HYDROCHLORIDE 1750 MG: 10 INJECTION, POWDER, LYOPHILIZED, FOR SOLUTION INTRAVENOUS at 01:17

## 2018-09-30 RX ADMIN — ACETAMINOPHEN 650 MG: 325 TABLET ORAL at 05:30

## 2018-09-30 RX ADMIN — LISINOPRIL 20 MG: 20 TABLET ORAL at 10:03

## 2018-09-30 RX ADMIN — VANCOMYCIN HYDROCHLORIDE 1750 MG: 10 INJECTION, POWDER, LYOPHILIZED, FOR SOLUTION INTRAVENOUS at 14:13

## 2018-09-30 RX ADMIN — Medication 10 ML: at 14:13

## 2018-09-30 RX ADMIN — Medication 10 ML: at 22:02

## 2018-09-30 RX ADMIN — Medication 10 ML: at 05:30

## 2018-09-30 RX ADMIN — METHADONE HYDROCHLORIDE 115 MG: 5 SOLUTION ORAL at 10:17

## 2018-09-30 RX ADMIN — OXYCODONE AND ACETAMINOPHEN 1 TABLET: 5; 325 TABLET ORAL at 20:09

## 2018-09-30 RX ADMIN — Medication 10 ML: at 01:18

## 2018-09-30 RX ADMIN — ACETAMINOPHEN 650 MG: 325 TABLET ORAL at 18:37

## 2018-09-30 RX ADMIN — ZOLPIDEM TARTRATE 5 MG: 5 TABLET ORAL at 22:20

## 2018-09-30 NOTE — PROGRESS NOTES
Hospitalist Progress Note  Nasima Crawley MD  Answering service: [phone] from in house phone  Cell: [phone]      Date of Service:  2018  NAME:  Francy Lopez  :  1965  MRN:  [id number]      Admission Summary:   A 47 y.o man with a history of CVA, HTN, chronic pain, chronic left foot ulcer who presents with worsening of his ulcers. He reports an ulcer on the dorsum of his left foot that is reportedly the result of trauma that has been present for about a year, and a few weeks ago developed another ulcer on the medial aspect of the foot. He reports following in a wound care clinic and recently being prescribed ciprofloxacin and clindamycin which did not help. For the past week, the ulcers have been increasingly more painful and he noted yellow drainage from them. He denies fevers. Interval history / Subjective:   Left foot pain 9/10,      Assessment & Plan:     L foot ulcer infected (POA)   -?traumatic, appear mildly infected with surrounding cellulitic changes at medial side of left foot and dorsal side anteriorly foot  - not improve with clindamycin and ciprofloxacin PO.  -follow up wound cx   -IV vancomycin  -x ray right foot  soft tissue swelling along the dorsum of the foot. .  -wound care consulted  -Podiatrist is consulted     HTN  -continue lisinopril and monitor BP     Chronic pain  -on  methadone  -PRN Percocet for acute pain     Gout  -stable, Allopurinol      Bladder cancer  -s/p surgery in 2018  -stable     Hypothyroidism  -synthroid     Hx of hemorrhagic stork with left sided hemiparesis   -patient uses wheelchair and walker.    -continue supportive care      Code status: Full Code  DVT prophylaxis: SCD    Care Plan discussed with: Patient/Family and Nurse  Disposition: TBD     Hospital Problems  Date Reviewed: 2018          Codes Class Noted POA    * (Principal)Foot ulcer ([address] ICD-10-CM: L97.509  ICD-9-CM: 707.15  9/28/2018 Unknown        Stroke ([facility] [address] ICD-10-CM: I63.9  ICD-9-CM: 434.91  3/11/2011 Yes        Hypertension ICD-10-CM: I10  ICD-9-CM: 401.9  3/11/2011 Yes              Vital Signs:    Last 24hrs VS reviewed since prior progress note. Most recent are:  Visit Vitals    /85    Pulse (!) 54    Temp 98.3 °F (36.8 °C)    Resp 16    Ht 6' 1\" (1.854 m)    Wt 103.4 kg (228 lb)    SpO2 95%    BMI 30.08 kg/m2         Intake/Output Summary (Last 24 hours) at 09/30/18 1002  Last data filed at 09/30/18 0805   Gross per 24 hour   Intake                0 ml   Output             1750 ml   Net            -1750 ml        Physical Examination:             Constitutional:  No acute distress, cooperative, pleasant    ENT:  Oral mucous moist, oropharynx benign. Neck supple,    Resp:  CTA bilaterally. No wheezing/rhonchi/rales. No accessory muscle use   CV:  Regular rhythm, normal rate, no murmurs, gallops, rubs    GI:  Soft, non distended, non tender. normoactive bowel sounds, no hepatosplenomegaly     Musculoskeletal:  No edema, left foot ulcer wound with some discharge and surrounding erythema    Neurologic:  Conscious and well oriented, right extremities 5/5, LUE 1/5, LLE 4/5     Psych:  Good insight, Not anxious nor agitated.        Data Review:    Review and/or order of clinical lab test      Labs:     Recent Labs      09/30/18   0529  09/28/18   1701   WBC  7.3  10.5   HGB  12.0*  13.0   HCT  37.3  39.1   PLT  190  238     Recent Labs      09/30/18   0529  09/29/18   0332  09/28/18   1701   NA  142  143  140   K  4.0  3.6  3.5   CL  107  108  104   CO2  26  27  29   BUN  7  7  6   CREA  0.66*  0.74  0.79   GLU  110*  107*  97   CA  7.9*  8.1*  8.4*     Recent Labs      09/30/18   0529  09/28/18   1701   SGOT  13*  14*   ALT  17  21   AP  145*  143*   TBILI  0.3  0.6   TP  6.1*  6.9   ALB  3.0*  4.0   GLOB  3.1  2.9     No results for input(s): INR, PTP, APTT in the last 72 hours.    No lab exists for component: INREXT, INREXT   No results for input(s): FE, TIBC, PSAT, FERR in the last 72 hours. Lab Results   Component Value Date/Time    Folate 8.6 12/13/2012 03:20 AM      No results for input(s): PH, PCO2, PO2 in the last 72 hours. No results for input(s): CPK, CKNDX, TROIQ in the last 72 hours.     No lab exists for component: CPKMB  Lab Results   Component Value Date/Time    Cholesterol, total 152 12/13/2012 03:30 AM    HDL Cholesterol 26 12/13/2012 03:30 AM    LDL, calculated 92.2 12/13/2012 03:30 AM    Triglyceride 169 (H) 12/13/2012 03:30 AM    CHOL/HDL Ratio 5.8 (H) 12/13/2012 03:30 AM     Lab Results   Component Value Date/Time    Glucose (POC) 150 (H) 03/19/2018 11:45 AM    Glucose (POC) 123 (H) 03/18/2018 08:55 PM    Glucose (POC) 96 03/16/2018 04:58 PM    Glucose (POC) 116 (H) 12/12/2012 12:42 PM    Glucose (POC) 99 12/11/2012 11:27 PM     Lab Results   Component Value Date/Time    Color YELLOW/STRAW 07/31/2018 05:52 PM    Appearance CLEAR 07/31/2018 05:52 PM    Specific gravity 1.006 07/31/2018 05:52 PM    pH (UA) 6.0 07/31/2018 05:52 PM    Protein NEGATIVE  07/31/2018 05:52 PM    Glucose NEGATIVE  07/31/2018 05:52 PM    Ketone NEGATIVE  07/31/2018 05:52 PM    Bilirubin NEGATIVE  07/31/2018 05:52 PM    Urobilinogen 0.2 07/31/2018 05:52 PM    Nitrites NEGATIVE  07/31/2018 05:52 PM    Leukocyte Esterase NEGATIVE  07/31/2018 05:52 PM    Epithelial cells FEW 07/31/2018 05:52 PM    Bacteria NEGATIVE  07/31/2018 05:52 PM    WBC 0-4 07/31/2018 05:52 PM    RBC 0-5 07/31/2018 05:52 PM         Medications Reviewed:     Current Facility-Administered Medications   Medication Dose Route Frequency    [START ON 10/1/2018] Vancomycin Trough 10/1/18 @ 0100   Other ONCE    vancomycin (VANCOCIN) 1750 mg in  ml infusion  1,750 mg IntraVENous Q12H    methadone (DOLOPHINE) 5 mg/5 mL oral solution 115 mg  115 mg Oral DAILY    allopurinol (ZYLOPRIM) tablet 100 mg  100 mg Oral DAILY    levothyroxine (SYNTHROID) tablet 88 mcg  88 mcg Oral ACB    lisinopril (PRINIVIL, ZESTRIL) tablet 20 mg  20 mg Oral DAILY    tamsulosin (FLOMAX) capsule 0.4 mg  0.4 mg Oral DAILY    sodium chloride (NS) flush 5-10 mL  5-10 mL IntraVENous Q8H    sodium chloride (NS) flush 5-10 mL  5-10 mL IntraVENous PRN    acetaminophen (TYLENOL) tablet 650 mg  650 mg Oral Q4H PRN    ondansetron (ZOFRAN) injection 4 mg  4 mg IntraVENous Q4H PRN    oxyCODONE-acetaminophen (PERCOCET) 5-325 mg per tablet 1 Tab  1 Tab Oral Q4H PRN    Vancomycin Pharmacy to Dose   Other Rx Dosing/Monitoring     ______________________________________________________________________  EXPECTED LENGTH OF STAY: - - -  ACTUAL LENGTH OF STAY:          2                 Gene Morales MD

## 2018-09-30 NOTE — PROGRESS NOTES
Bedside shift change report given to Keith Monaco RN (oncoming nurse) by Cherylene Peeks RN (offgoing nurse). Report included the following information SBAR, Kardex, Intake/Output, MAR, Accordion and Recent Results.

## 2018-09-30 NOTE — CONSULTS
[address]    Arthvicente Rivas  MR#: [id number]  : 1965  ACCOUNT #: [de-identified]   DATE OF SERVICE: 2018    CONSULTING PHYSICIAN:  Tien Cabrera DPM    REFERRING PHYSICIAN:  Emily Schroeder MD    REASON FOR CONSULTATION:  Evaluation and treatment of left foot ulcerations with associated cellulitis. HISTORY OF PRESENT ILLNESS:  The patient is a pleasant 56-year-old white male with an extensive past medical history, which includes previous stroke, hypertension, chronic foot ulcer, cerebral hemorrhage with hemiparesis, chronic pain, major depressive disorder, brain aneurysm, neurologic gait dysfunction, bladder cancer, seizure disorder and tobacco abuse, who presents to [facility] ED upon referral from urgent care center at [facility] for cellulitis and pain of his left foot ulcerations. The patient has been seen previously by our practice in the [facility] as well as in our private practice office for treatment of these wounds, which have been nonhealing for the better part of a year. He relates most recently that he has been put on some antibiotics per Joseline Dunne NP at the [facility], but that the wounds have gotten worse. He relates persistent pain despite his pain management at the methadone clinic on [facility]. He denies any fevers, chills, sweats, nausea or vomiting. PAST MEDICAL HISTORY:  As above. PAST SURGICAL HISTORY:  Significant for left knee arthroscopy, urological cystoscopy, resection of a portion of his bladder. SOCIAL HISTORY:  Positive for being every day smoker. Positive for alcohol use. ALLERGIES:  SULFA, CIPRO, CODEINE, GABAPENTIN, LYRICA, ULTRAM.    MEDICATIONS:  Allopurinol 100 mg every day, Cipro p.o., clindamycin p.o., Colcrys, Synthroid 88 mcg, lisinopril 20 mg, methadone 10 mg per mL solution 115 mg p.o. every day, Flomax 0.4 mg p.o. every day, terbinafine 250 mg p.o. every day, Chantix 1 mg p.o. b.i.d. REVIEW OF SYSTEMS:  Negative except for that written in the history of present illness. PHYSICAL EXAMINATION:  VITAL SIGNS:  Patient's vital signs are as follows:  Oral temperature 98.3, pulse 54, blood pressure 160/85, respiratory rate 16, pulse ox 95% on room air. GENERAL:  The patient is awake, alert and oriented x3. HEENT:  Normocephalic and atraumatic. The patient has a damaged tooth in his mouth along the left side with pain. CARDIOVASCULAR:  Reveals positive S1, S2.    EXTREMITIES:  Reveals palpable pedal pulses. The patient is noted to have chronic nonhealing wound over the dorsal left foot over the second and third metatarsophalangeal joint areas with a fibrogranular base, which is a 70% fibrinous slough type tissue, 30% granulation tissue with mild to moderate surrounding erythema and edema consistent with cellulitis. The patient is also noted to have what appears to be a venous insufficiency type ulceration on the medial aspect of the left heel with a fibrogranular base and mild to moderate serous drainage consistent with venous insufficiency. LABORATORY DATA:  Microbiology reports from the culture of the left foot show no organisms at this point. White blood cell count is normal.    ASSESSMENT AND PLAN:  Chronic nonhealing left foot multiple wounds, which appear to be venous insufficiency in nature. At bedside today, I spoke with the patient about the need for debridement of these wounds as they are chronic and sloughy, have not been debrided in quite some time. In light of the nonhealing nature of these wounds, I have recommended a placental membrane graft tissue application at the time of debridement. The patient understands this and would like to proceed with some type of intervention as these wounds have not been able to heal for the better part of a year. We will make him n.p.o., consent him for tomorrow with plans for OR tomorrow.       PEDRITO JENAE Fishman  D: 09/30/2018 10:59     T: 09/30/2018 12:18  JOB #: [id number]

## 2018-10-01 ENCOUNTER — ANESTHESIA EVENT (OUTPATIENT)
Dept: SURGERY | Age: 53
DRG: 364 | End: 2018-10-01
Payer: MEDICAID

## 2018-10-01 ENCOUNTER — ANESTHESIA (OUTPATIENT)
Dept: SURGERY | Age: 53
DRG: 364 | End: 2018-10-01
Payer: MEDICAID

## 2018-10-01 LAB
ANION GAP SERPL CALC-SCNC: 8 MMOL/L (ref 5–15)
BUN SERPL-MCNC: 9 MG/DL (ref 6–20)
BUN/CREAT SERPL: 9 (ref 12–20)
CALCIUM SERPL-MCNC: 8 MG/DL (ref 8.5–10.1)
CHLORIDE SERPL-SCNC: 105 MMOL/L (ref 97–108)
CO2 SERPL-SCNC: 27 MMOL/L (ref 21–32)
CREAT SERPL-MCNC: 0.95 MG/DL (ref 0.7–1.3)
DATE LAST DOSE: ABNORMAL
GLUCOSE SERPL-MCNC: 129 MG/DL (ref 65–100)
MAGNESIUM SERPL-MCNC: 1.8 MG/DL (ref 1.6–2.4)
POTASSIUM SERPL-SCNC: 4 MMOL/L (ref 3.5–5.1)
REPORTED DOSE,DOSE: ABNORMAL UNITS
REPORTED DOSE/TIME,TMG: ABNORMAL
SODIUM SERPL-SCNC: 140 MMOL/L (ref 136–145)
VANCOMYCIN TROUGH SERPL-MCNC: 14.3 UG/ML (ref 5–10)

## 2018-10-01 PROCEDURE — 74011250637 HC RX REV CODE- 250/637: Performed by: HOSPITALIST

## 2018-10-01 PROCEDURE — 77030020754 HC CUF TRNQT 2BLA STRY -B: Performed by: PODIATRIST

## 2018-10-01 PROCEDURE — 74011000250 HC RX REV CODE- 250

## 2018-10-01 PROCEDURE — 94760 N-INVAS EAR/PLS OXIMETRY 1: CPT

## 2018-10-01 PROCEDURE — 87205 SMEAR GRAM STAIN: CPT | Performed by: HOSPITALIST

## 2018-10-01 PROCEDURE — 74011250636 HC RX REV CODE- 250/636

## 2018-10-01 PROCEDURE — 74011000250 HC RX REV CODE- 250: Performed by: PODIATRIST

## 2018-10-01 PROCEDURE — 77030036687 HC SHOE PSTOP S2SG -A

## 2018-10-01 PROCEDURE — 76010000138 HC OR TIME 0.5 TO 1 HR: Performed by: PODIATRIST

## 2018-10-01 PROCEDURE — 74011250636 HC RX REV CODE- 250/636: Performed by: ANESTHESIOLOGY

## 2018-10-01 PROCEDURE — 74011000250 HC RX REV CODE- 250: Performed by: ANESTHESIOLOGY

## 2018-10-01 PROCEDURE — 76210000016 HC OR PH I REC 1 TO 1.5 HR: Performed by: PODIATRIST

## 2018-10-01 PROCEDURE — 74011250636 HC RX REV CODE- 250/636: Performed by: HOSPITALIST

## 2018-10-01 PROCEDURE — 77030011640 HC PAD GRND REM COVD -A: Performed by: PODIATRIST

## 2018-10-01 PROCEDURE — 77030019895 HC PCKNG STRP IODO -A: Performed by: PODIATRIST

## 2018-10-01 PROCEDURE — 83735 ASSAY OF MAGNESIUM: CPT | Performed by: HOSPITALIST

## 2018-10-01 PROCEDURE — 87186 SC STD MICRODIL/AGAR DIL: CPT | Performed by: HOSPITALIST

## 2018-10-01 PROCEDURE — 77030020782 HC GWN BAIR PAWS FLX 3M -B

## 2018-10-01 PROCEDURE — 0JRR0KZ REPLACEMENT OF LEFT FOOT SUBCUTANEOUS TISSUE AND FASCIA WITH NONAUTOLOGOUS TISSUE SUBSTITUTE, OPEN APPROACH: ICD-10-PCS | Performed by: PODIATRIST

## 2018-10-01 PROCEDURE — 77030010509 HC AIRWY LMA MSK TELE -A: Performed by: ANESTHESIOLOGY

## 2018-10-01 PROCEDURE — 87075 CULTR BACTERIA EXCEPT BLOOD: CPT | Performed by: HOSPITALIST

## 2018-10-01 PROCEDURE — 77030018836 HC SOL IRR NACL ICUM -A: Performed by: PODIATRIST

## 2018-10-01 PROCEDURE — 77010033678 HC OXYGEN DAILY

## 2018-10-01 PROCEDURE — 65270000029 HC RM PRIVATE

## 2018-10-01 PROCEDURE — 74011250636 HC RX REV CODE- 250/636: Performed by: PODIATRIST

## 2018-10-01 PROCEDURE — 80048 BASIC METABOLIC PNL TOTAL CA: CPT | Performed by: HOSPITALIST

## 2018-10-01 PROCEDURE — 0JBR0ZZ EXCISION OF LEFT FOOT SUBCUTANEOUS TISSUE AND FASCIA, OPEN APPROACH: ICD-10-PCS | Performed by: PODIATRIST

## 2018-10-01 PROCEDURE — 76060000032 HC ANESTHESIA 0.5 TO 1 HR: Performed by: PODIATRIST

## 2018-10-01 PROCEDURE — 36415 COLL VENOUS BLD VENIPUNCTURE: CPT | Performed by: HOSPITALIST

## 2018-10-01 PROCEDURE — 80202 ASSAY OF VANCOMYCIN: CPT | Performed by: HOSPITALIST

## 2018-10-01 PROCEDURE — 87077 CULTURE AEROBIC IDENTIFY: CPT | Performed by: HOSPITALIST

## 2018-10-01 DEVICE — GRAFT HUM TISS W5XL5CM PLCNTA MEM CRYOPRESERVED AMNION MTRX: Type: IMPLANTABLE DEVICE | Site: FOOT | Status: FUNCTIONAL

## 2018-10-01 RX ORDER — SODIUM CHLORIDE 0.9 % (FLUSH) 0.9 %
5-10 SYRINGE (ML) INJECTION EVERY 8 HOURS
Status: DISCONTINUED | OUTPATIENT
Start: 2018-10-01 | End: 2018-10-01 | Stop reason: HOSPADM

## 2018-10-01 RX ORDER — SODIUM CHLORIDE, SODIUM LACTATE, POTASSIUM CHLORIDE, CALCIUM CHLORIDE 600; 310; 30; 20 MG/100ML; MG/100ML; MG/100ML; MG/100ML
1000 INJECTION, SOLUTION INTRAVENOUS CONTINUOUS
Status: DISCONTINUED | OUTPATIENT
Start: 2018-10-01 | End: 2018-10-01 | Stop reason: HOSPADM

## 2018-10-01 RX ORDER — ONDANSETRON 2 MG/ML
4 INJECTION INTRAMUSCULAR; INTRAVENOUS AS NEEDED
Status: DISCONTINUED | OUTPATIENT
Start: 2018-10-01 | End: 2018-10-01 | Stop reason: HOSPADM

## 2018-10-01 RX ORDER — MIDAZOLAM HYDROCHLORIDE 1 MG/ML
1 INJECTION, SOLUTION INTRAMUSCULAR; INTRAVENOUS AS NEEDED
Status: DISCONTINUED | OUTPATIENT
Start: 2018-10-01 | End: 2018-10-01 | Stop reason: HOSPADM

## 2018-10-01 RX ORDER — ACETAMINOPHEN 10 MG/ML
INJECTION, SOLUTION INTRAVENOUS AS NEEDED
Status: DISCONTINUED | OUTPATIENT
Start: 2018-10-01 | End: 2018-10-01 | Stop reason: HOSPADM

## 2018-10-01 RX ORDER — LIDOCAINE HYDROCHLORIDE 20 MG/ML
INJECTION, SOLUTION EPIDURAL; INFILTRATION; INTRACAUDAL; PERINEURAL AS NEEDED
Status: DISCONTINUED | OUTPATIENT
Start: 2018-10-01 | End: 2018-10-01 | Stop reason: HOSPADM

## 2018-10-01 RX ORDER — HYDROMORPHONE HYDROCHLORIDE 2 MG/ML
1 INJECTION, SOLUTION INTRAMUSCULAR; INTRAVENOUS; SUBCUTANEOUS
Status: DISCONTINUED | OUTPATIENT
Start: 2018-10-01 | End: 2018-10-07

## 2018-10-01 RX ORDER — DIAZEPAM 10 MG/2ML
5 INJECTION INTRAMUSCULAR
Status: COMPLETED | OUTPATIENT
Start: 2018-10-01 | End: 2018-10-01

## 2018-10-01 RX ORDER — DIAZEPAM 10 MG/2ML
INJECTION INTRAMUSCULAR
Status: DISPENSED
Start: 2018-10-01 | End: 2018-10-02

## 2018-10-01 RX ORDER — MIDAZOLAM HYDROCHLORIDE 1 MG/ML
0.5 INJECTION, SOLUTION INTRAMUSCULAR; INTRAVENOUS
Status: DISCONTINUED | OUTPATIENT
Start: 2018-10-01 | End: 2018-10-01 | Stop reason: HOSPADM

## 2018-10-01 RX ORDER — SODIUM CHLORIDE 9 MG/ML
25 INJECTION, SOLUTION INTRAVENOUS CONTINUOUS
Status: DISCONTINUED | OUTPATIENT
Start: 2018-10-01 | End: 2018-10-01 | Stop reason: HOSPADM

## 2018-10-01 RX ORDER — LABETALOL HYDROCHLORIDE 5 MG/ML
10 INJECTION, SOLUTION INTRAVENOUS ONCE
Status: COMPLETED | OUTPATIENT
Start: 2018-10-01 | End: 2018-10-01

## 2018-10-01 RX ORDER — FENTANYL CITRATE 50 UG/ML
50 INJECTION, SOLUTION INTRAMUSCULAR; INTRAVENOUS AS NEEDED
Status: DISCONTINUED | OUTPATIENT
Start: 2018-10-01 | End: 2018-10-01 | Stop reason: HOSPADM

## 2018-10-01 RX ORDER — DEXAMETHASONE SODIUM PHOSPHATE 4 MG/ML
INJECTION, SOLUTION INTRA-ARTICULAR; INTRALESIONAL; INTRAMUSCULAR; INTRAVENOUS; SOFT TISSUE AS NEEDED
Status: DISCONTINUED | OUTPATIENT
Start: 2018-10-01 | End: 2018-10-01 | Stop reason: HOSPADM

## 2018-10-01 RX ORDER — SODIUM CHLORIDE 0.9 % (FLUSH) 0.9 %
5-10 SYRINGE (ML) INJECTION AS NEEDED
Status: DISCONTINUED | OUTPATIENT
Start: 2018-10-01 | End: 2018-10-01 | Stop reason: HOSPADM

## 2018-10-01 RX ORDER — MIDAZOLAM HYDROCHLORIDE 1 MG/ML
1 INJECTION, SOLUTION INTRAMUSCULAR; INTRAVENOUS ONCE
Status: COMPLETED | OUTPATIENT
Start: 2018-10-01 | End: 2018-10-01

## 2018-10-01 RX ORDER — HYDROMORPHONE HYDROCHLORIDE 2 MG/1
1 TABLET ORAL
Status: DISCONTINUED | OUTPATIENT
Start: 2018-10-01 | End: 2018-10-01

## 2018-10-01 RX ORDER — FENTANYL CITRATE 50 UG/ML
25 INJECTION, SOLUTION INTRAMUSCULAR; INTRAVENOUS ONCE
Status: COMPLETED | OUTPATIENT
Start: 2018-10-01 | End: 2018-10-01

## 2018-10-01 RX ORDER — EPHEDRINE SULFATE 50 MG/ML
INJECTION, SOLUTION INTRAVENOUS AS NEEDED
Status: DISCONTINUED | OUTPATIENT
Start: 2018-10-01 | End: 2018-10-01 | Stop reason: HOSPADM

## 2018-10-01 RX ORDER — ONDANSETRON 2 MG/ML
INJECTION INTRAMUSCULAR; INTRAVENOUS AS NEEDED
Status: DISCONTINUED | OUTPATIENT
Start: 2018-10-01 | End: 2018-10-01 | Stop reason: HOSPADM

## 2018-10-01 RX ORDER — LABETALOL HYDROCHLORIDE 5 MG/ML
INJECTION, SOLUTION INTRAVENOUS
Status: DISPENSED
Start: 2018-10-01 | End: 2018-10-02

## 2018-10-01 RX ORDER — BUPIVACAINE HYDROCHLORIDE 5 MG/ML
INJECTION, SOLUTION EPIDURAL; INTRACAUDAL AS NEEDED
Status: DISCONTINUED | OUTPATIENT
Start: 2018-10-01 | End: 2018-10-01 | Stop reason: HOSPADM

## 2018-10-01 RX ORDER — KETAMINE HYDROCHLORIDE 10 MG/ML
INJECTION, SOLUTION INTRAMUSCULAR; INTRAVENOUS AS NEEDED
Status: DISCONTINUED | OUTPATIENT
Start: 2018-10-01 | End: 2018-10-01 | Stop reason: HOSPADM

## 2018-10-01 RX ORDER — LIDOCAINE HYDROCHLORIDE 10 MG/ML
0.1 INJECTION, SOLUTION EPIDURAL; INFILTRATION; INTRACAUDAL; PERINEURAL AS NEEDED
Status: DISCONTINUED | OUTPATIENT
Start: 2018-10-01 | End: 2018-10-01 | Stop reason: HOSPADM

## 2018-10-01 RX ORDER — FENTANYL CITRATE 50 UG/ML
25 INJECTION, SOLUTION INTRAMUSCULAR; INTRAVENOUS
Status: COMPLETED | OUTPATIENT
Start: 2018-10-01 | End: 2018-10-01

## 2018-10-01 RX ORDER — FENTANYL CITRATE 50 UG/ML
INJECTION, SOLUTION INTRAMUSCULAR; INTRAVENOUS AS NEEDED
Status: DISCONTINUED | OUTPATIENT
Start: 2018-10-01 | End: 2018-10-01 | Stop reason: HOSPADM

## 2018-10-01 RX ORDER — COLCHICINE 0.6 MG/1
0.6 TABLET ORAL DAILY
Status: COMPLETED | OUTPATIENT
Start: 2018-10-01 | End: 2018-10-03

## 2018-10-01 RX ORDER — MORPHINE SULFATE 2 MG/ML
2 INJECTION, SOLUTION INTRAMUSCULAR; INTRAVENOUS
Status: DISCONTINUED | OUTPATIENT
Start: 2018-10-01 | End: 2018-10-01 | Stop reason: HOSPADM

## 2018-10-01 RX ORDER — OXYCODONE HYDROCHLORIDE 5 MG/1
5 TABLET ORAL AS NEEDED
Status: DISCONTINUED | OUTPATIENT
Start: 2018-10-01 | End: 2018-10-01 | Stop reason: HOSPADM

## 2018-10-01 RX ORDER — PROPOFOL 10 MG/ML
INJECTION, EMULSION INTRAVENOUS AS NEEDED
Status: DISCONTINUED | OUTPATIENT
Start: 2018-10-01 | End: 2018-10-01 | Stop reason: HOSPADM

## 2018-10-01 RX ORDER — HYDROMORPHONE HYDROCHLORIDE 2 MG/ML
0.5 INJECTION, SOLUTION INTRAMUSCULAR; INTRAVENOUS; SUBCUTANEOUS
Status: DISCONTINUED | OUTPATIENT
Start: 2018-10-01 | End: 2018-10-01 | Stop reason: HOSPADM

## 2018-10-01 RX ORDER — DIPHENHYDRAMINE HYDROCHLORIDE 50 MG/ML
12.5 INJECTION, SOLUTION INTRAMUSCULAR; INTRAVENOUS AS NEEDED
Status: DISCONTINUED | OUTPATIENT
Start: 2018-10-01 | End: 2018-10-01 | Stop reason: HOSPADM

## 2018-10-01 RX ORDER — MIDAZOLAM HYDROCHLORIDE 1 MG/ML
INJECTION, SOLUTION INTRAMUSCULAR; INTRAVENOUS AS NEEDED
Status: DISCONTINUED | OUTPATIENT
Start: 2018-10-01 | End: 2018-10-01 | Stop reason: HOSPADM

## 2018-10-01 RX ORDER — SODIUM CHLORIDE, SODIUM LACTATE, POTASSIUM CHLORIDE, CALCIUM CHLORIDE 600; 310; 30; 20 MG/100ML; MG/100ML; MG/100ML; MG/100ML
100 INJECTION, SOLUTION INTRAVENOUS CONTINUOUS
Status: DISCONTINUED | OUTPATIENT
Start: 2018-10-01 | End: 2018-10-01 | Stop reason: HOSPADM

## 2018-10-01 RX ORDER — SODIUM CHLORIDE, SODIUM LACTATE, POTASSIUM CHLORIDE, CALCIUM CHLORIDE 600; 310; 30; 20 MG/100ML; MG/100ML; MG/100ML; MG/100ML
INJECTION, SOLUTION INTRAVENOUS
Status: DISCONTINUED | OUTPATIENT
Start: 2018-10-01 | End: 2018-10-01 | Stop reason: HOSPADM

## 2018-10-01 RX ORDER — ROPIVACAINE HYDROCHLORIDE 5 MG/ML
150 INJECTION, SOLUTION EPIDURAL; INFILTRATION; PERINEURAL AS NEEDED
Status: DISCONTINUED | OUTPATIENT
Start: 2018-10-01 | End: 2018-10-01 | Stop reason: HOSPADM

## 2018-10-01 RX ADMIN — MIDAZOLAM HYDROCHLORIDE 1 MG: 1 INJECTION, SOLUTION INTRAMUSCULAR; INTRAVENOUS at 12:54

## 2018-10-01 RX ADMIN — KETAMINE HYDROCHLORIDE 30 MG: 10 INJECTION, SOLUTION INTRAMUSCULAR; INTRAVENOUS at 13:27

## 2018-10-01 RX ADMIN — ACETAMINOPHEN 1000 MG: 10 INJECTION, SOLUTION INTRAVENOUS at 13:24

## 2018-10-01 RX ADMIN — EPHEDRINE SULFATE 10 MG: 50 INJECTION, SOLUTION INTRAVENOUS at 13:41

## 2018-10-01 RX ADMIN — EPHEDRINE SULFATE 10 MG: 50 INJECTION, SOLUTION INTRAVENOUS at 13:37

## 2018-10-01 RX ADMIN — SODIUM CHLORIDE, SODIUM LACTATE, POTASSIUM CHLORIDE, CALCIUM CHLORIDE: 600; 310; 30; 20 INJECTION, SOLUTION INTRAVENOUS at 12:48

## 2018-10-01 RX ADMIN — TAMSULOSIN HYDROCHLORIDE 0.4 MG: 0.4 CAPSULE ORAL at 08:49

## 2018-10-01 RX ADMIN — PROPOFOL 150 MG: 10 INJECTION, EMULSION INTRAVENOUS at 13:18

## 2018-10-01 RX ADMIN — HYDROMORPHONE HYDROCHLORIDE 1 MG: 2 INJECTION INTRAMUSCULAR; INTRAVENOUS; SUBCUTANEOUS at 23:04

## 2018-10-01 RX ADMIN — ACETAMINOPHEN 650 MG: 325 TABLET ORAL at 05:47

## 2018-10-01 RX ADMIN — Medication 10 ML: at 05:47

## 2018-10-01 RX ADMIN — ONDANSETRON 4 MG: 2 INJECTION INTRAMUSCULAR; INTRAVENOUS at 13:28

## 2018-10-01 RX ADMIN — OXYCODONE AND ACETAMINOPHEN 1 TABLET: 5; 325 TABLET ORAL at 04:05

## 2018-10-01 RX ADMIN — LABETALOL HYDROCHLORIDE 10 MG: 5 INJECTION, SOLUTION INTRAVENOUS at 15:06

## 2018-10-01 RX ADMIN — LISINOPRIL 20 MG: 20 TABLET ORAL at 08:49

## 2018-10-01 RX ADMIN — FENTANYL CITRATE 25 MCG: 50 INJECTION, SOLUTION INTRAMUSCULAR; INTRAVENOUS at 14:20

## 2018-10-01 RX ADMIN — Medication 10 ML: at 23:05

## 2018-10-01 RX ADMIN — OXYCODONE AND ACETAMINOPHEN 1 TABLET: 5; 325 TABLET ORAL at 07:52

## 2018-10-01 RX ADMIN — DEXAMETHASONE SODIUM PHOSPHATE 4 MG: 4 INJECTION, SOLUTION INTRA-ARTICULAR; INTRALESIONAL; INTRAMUSCULAR; INTRAVENOUS; SOFT TISSUE at 13:28

## 2018-10-01 RX ADMIN — LEVOTHYROXINE SODIUM 88 MCG: 88 TABLET ORAL at 05:47

## 2018-10-01 RX ADMIN — HYDROMORPHONE HYDROCHLORIDE 0.5 MG: 2 INJECTION INTRAMUSCULAR; INTRAVENOUS; SUBCUTANEOUS at 15:02

## 2018-10-01 RX ADMIN — HYDROMORPHONE HYDROCHLORIDE 0.5 MG: 2 INJECTION INTRAMUSCULAR; INTRAVENOUS; SUBCUTANEOUS at 15:23

## 2018-10-01 RX ADMIN — Medication 5 MG: at 14:27

## 2018-10-01 RX ADMIN — FENTANYL CITRATE 50 MCG: 50 INJECTION, SOLUTION INTRAMUSCULAR; INTRAVENOUS at 13:53

## 2018-10-01 RX ADMIN — HYDROMORPHONE HYDROCHLORIDE 1 MG: 2 INJECTION INTRAMUSCULAR; INTRAVENOUS; SUBCUTANEOUS at 20:17

## 2018-10-01 RX ADMIN — ALLOPURINOL 100 MG: 100 TABLET ORAL at 08:49

## 2018-10-01 RX ADMIN — HYDROMORPHONE HYDROCHLORIDE 1 MG: 2 INJECTION INTRAMUSCULAR; INTRAVENOUS; SUBCUTANEOUS at 16:07

## 2018-10-01 RX ADMIN — FENTANYL CITRATE 25 MCG: 50 INJECTION, SOLUTION INTRAMUSCULAR; INTRAVENOUS at 14:04

## 2018-10-01 RX ADMIN — VANCOMYCIN HYDROCHLORIDE 1750 MG: 10 INJECTION, POWDER, LYOPHILIZED, FOR SOLUTION INTRAVENOUS at 16:07

## 2018-10-01 RX ADMIN — HYDROMORPHONE HYDROCHLORIDE 0.5 MG: 2 INJECTION INTRAMUSCULAR; INTRAVENOUS; SUBCUTANEOUS at 14:43

## 2018-10-01 RX ADMIN — VANCOMYCIN HYDROCHLORIDE 1750 MG: 10 INJECTION, POWDER, LYOPHILIZED, FOR SOLUTION INTRAVENOUS at 00:36

## 2018-10-01 RX ADMIN — LIDOCAINE HYDROCHLORIDE 100 MG: 20 INJECTION, SOLUTION EPIDURAL; INFILTRATION; INTRACAUDAL; PERINEURAL at 13:18

## 2018-10-01 RX ADMIN — FENTANYL CITRATE 25 MCG: 50 INJECTION, SOLUTION INTRAMUSCULAR; INTRAVENOUS at 14:16

## 2018-10-01 RX ADMIN — METHADONE HYDROCHLORIDE 115 MG: 5 SOLUTION ORAL at 08:49

## 2018-10-01 RX ADMIN — FENTANYL CITRATE 25 MCG: 50 INJECTION, SOLUTION INTRAMUSCULAR; INTRAVENOUS at 12:54

## 2018-10-01 RX ADMIN — KETAMINE HYDROCHLORIDE 20 MG: 10 INJECTION, SOLUTION INTRAMUSCULAR; INTRAVENOUS at 13:54

## 2018-10-01 RX ADMIN — ZOLPIDEM TARTRATE 5 MG: 5 TABLET ORAL at 23:04

## 2018-10-01 RX ADMIN — COLCHICINE 0.6 MG: 0.6 TABLET, FILM COATED ORAL at 20:17

## 2018-10-01 RX ADMIN — FENTANYL CITRATE 25 MCG: 50 INJECTION, SOLUTION INTRAMUSCULAR; INTRAVENOUS at 14:09

## 2018-10-01 RX ADMIN — FENTANYL CITRATE 25 MCG: 50 INJECTION, SOLUTION INTRAMUSCULAR; INTRAVENOUS at 13:28

## 2018-10-01 RX ADMIN — OXYCODONE AND ACETAMINOPHEN 1 TABLET: 5; 325 TABLET ORAL at 18:48

## 2018-10-01 RX ADMIN — OXYCODONE AND ACETAMINOPHEN 1 TABLET: 5; 325 TABLET ORAL at 00:07

## 2018-10-01 RX ADMIN — FENTANYL CITRATE 25 MCG: 50 INJECTION, SOLUTION INTRAMUSCULAR; INTRAVENOUS at 13:18

## 2018-10-01 RX ADMIN — EPHEDRINE SULFATE 5 MG: 50 INJECTION, SOLUTION INTRAVENOUS at 13:39

## 2018-10-01 RX ADMIN — MIDAZOLAM HYDROCHLORIDE 2 MG: 1 INJECTION, SOLUTION INTRAMUSCULAR; INTRAVENOUS at 13:08

## 2018-10-01 NOTE — PROGRESS NOTES
Hospitalist Progress Note  Darlina Gaucher, MD  Answering service: [phone] OR 36 from in house phone  Cell: [phone]      Date of Service:  10/1/2018  NAME:  Leilani Ross  :  1965  MRN:  [id number]      Admission Summary:   A 47 y.o man with a history of CVA, HTN, chronic pain, chronic left foot ulcer who presents with worsening of his ulcers. He reports an ulcer on the dorsum of his left foot that is reportedly the result of trauma that has been present for about a year, and a few weeks ago developed another ulcer on the medial aspect of the foot. He reports following in a wound care clinic and recently being prescribed ciprofloxacin and clindamycin which did not help. For the past week, the ulcers have been increasingly more painful and he noted yellow drainage from them. He denies fevers. Interval history / Subjective:   Left foot pain the same 8-9/10, no left side chest pain or shortness of breath     Assessment & Plan:     Chronic left heel infected ulcer (POA)   -?traumatic, appear mildly infected with surrounding cellulitic changes at medial side of left foot and dorsal side anteriorly foot  - not improve with clindamycin and ciprofloxacin PO.  -follow up wound cx   -IV vancomycin  -x ray right foot  soft tissue swelling along the dorsum of the foot. .  -wound care consulted  -Podiatrist is on board and OR 10/1     HTN  -continue lisinopril and monitor BP    Sinus bradycardia  -asymptomatic  -HR 56-62     Chronic pain  -on  methadone  -PRN Percocet for acute pain     Gout  -stable, Allopurinol      Bladder cancer  -s/p surgery in 2018  -stable     Hypothyroidism  -synthroid   -check TSH in am    Hx of hemorrhagic stork with left sided hemiparesis   -patient uses wheelchair and walker.    -continue supportive care      Code status: Full Code  DVT prophylaxis: SCD    Care Plan discussed with: Patient/Family and Nurse  Disposition: TBD     Hospital Problems  Date Reviewed: 9/5/2018          Codes Class Noted POA    * (Principal)Foot ulcer ([facility] ICD-10-CM: L97.509  ICD-9-CM: 707.15  9/28/2018 Unknown        Stroke ([facility] ICD-10-CM: I63.9  ICD-9-CM: 434.91  3/11/2011 Yes        Hypertension ICD-10-CM: I10  ICD-9-CM: 401.9  3/11/2011 Yes              Vital Signs:    Last 24hrs VS reviewed since prior progress note. Most recent are:  Visit Vitals    /70    Pulse (!) 56    Temp 98.6 °F (37 °C)    Resp 18    Ht 6' 1\" (1.854 m)    Wt 103.4 kg (228 lb)    SpO2 95%    BMI 30.08 kg/m2         Intake/Output Summary (Last 24 hours) at 10/01/18 0809  Last data filed at 10/01/18 0500   Gross per 24 hour   Intake                0 ml   Output             1825 ml   Net            -1825 ml        Physical Examination:             Constitutional:  No acute distress, cooperative, pleasant    ENT:  Oral mucous moist, oropharynx benign. Neck supple,    Resp:  CTA bilaterally. No wheezing/rhonchi/rales. No accessory muscle use   CV:  Regular rhythm, normal rate, no murmurs, gallops, rubs    GI:  Soft, non distended, non tender. normoactive bowel sounds, no hepatosplenomegaly     Musculoskeletal:  No edema, left foot ulcer wound with some discharge and surrounding erythema    Neurologic:  Conscious and well oriented, right extremities 5/5, LUE 1/5, LLE 4/5     Psych:  Good insight, Not anxious nor agitated.        Data Review:    Review and/or order of clinical lab test      Labs:     Recent Labs      09/30/18   0529  09/28/18   1701   WBC  7.3  10.5   HGB  12.0*  13.0   HCT  37.3  39.1   PLT  190  238     Recent Labs      10/01/18   0050  09/30/18   0529  09/29/18   0332   NA  140  142  143   K  4.0  4.0  3.6   CL  105  107  108   CO2  27  26  27   BUN  9  7  7   CREA  0.95  0.66*  0.74   GLU  129*  110*  107*   CA  8.0*  7.9*  8.1*     Recent Labs      09/30/18   0529  09/28/18   1701   SGOT  13*  14*   ALT  17  21   AP  145*  143* TBILI  0.3  0.6   TP  6.1*  6.9   ALB  3.0*  4.0   GLOB  3.1  2.9     No results for input(s): INR, PTP, APTT in the last 72 hours. No lab exists for component: INREXT, INREXT   No results for input(s): FE, TIBC, PSAT, FERR in the last 72 hours. Lab Results   Component Value Date/Time    Folate 8.6 12/13/2012 03:20 AM      No results for input(s): PH, PCO2, PO2 in the last 72 hours. No results for input(s): CPK, CKNDX, TROIQ in the last 72 hours.     No lab exists for component: CPKMB  Lab Results   Component Value Date/Time    Cholesterol, total 152 12/13/2012 03:30 AM    HDL Cholesterol 26 12/13/2012 03:30 AM    LDL, calculated 92.2 12/13/2012 03:30 AM    Triglyceride 169 (H) 12/13/2012 03:30 AM    CHOL/HDL Ratio 5.8 (H) 12/13/2012 03:30 AM     Lab Results   Component Value Date/Time    Glucose (POC) 150 (H) 03/19/2018 11:45 AM    Glucose (POC) 123 (H) 03/18/2018 08:55 PM    Glucose (POC) 96 03/16/2018 04:58 PM    Glucose (POC) 116 (H) 12/12/2012 12:42 PM    Glucose (POC) 99 12/11/2012 11:27 PM     Lab Results   Component Value Date/Time    Color YELLOW/STRAW 07/31/2018 05:52 PM    Appearance CLEAR 07/31/2018 05:52 PM    Specific gravity 1.006 07/31/2018 05:52 PM    pH (UA) 6.0 07/31/2018 05:52 PM    Protein NEGATIVE  07/31/2018 05:52 PM    Glucose NEGATIVE  07/31/2018 05:52 PM    Ketone NEGATIVE  07/31/2018 05:52 PM    Bilirubin NEGATIVE  07/31/2018 05:52 PM    Urobilinogen 0.2 07/31/2018 05:52 PM    Nitrites NEGATIVE  07/31/2018 05:52 PM    Leukocyte Esterase NEGATIVE  07/31/2018 05:52 PM    Epithelial cells FEW 07/31/2018 05:52 PM    Bacteria NEGATIVE  07/31/2018 05:52 PM    WBC 0-4 07/31/2018 05:52 PM    RBC 0-5 07/31/2018 05:52 PM         Medications Reviewed:     Current Facility-Administered Medications   Medication Dose Route Frequency    zolpidem (AMBIEN) tablet 5 mg  5 mg Oral QHS PRN    vancomycin (VANCOCIN) 1750 mg in  ml infusion  1,750 mg IntraVENous Q12H    methadone (DOLOPHINE) 5 mg/5 mL oral solution 115 mg  115 mg Oral DAILY    allopurinol (ZYLOPRIM) tablet 100 mg  100 mg Oral DAILY    levothyroxine (SYNTHROID) tablet 88 mcg  88 mcg Oral ACB    lisinopril (PRINIVIL, ZESTRIL) tablet 20 mg  20 mg Oral DAILY    tamsulosin (FLOMAX) capsule 0.4 mg  0.4 mg Oral DAILY    sodium chloride (NS) flush 5-10 mL  5-10 mL IntraVENous Q8H    sodium chloride (NS) flush 5-10 mL  5-10 mL IntraVENous PRN    acetaminophen (TYLENOL) tablet 650 mg  650 mg Oral Q4H PRN    ondansetron (ZOFRAN) injection 4 mg  4 mg IntraVENous Q4H PRN    oxyCODONE-acetaminophen (PERCOCET) 5-325 mg per tablet 1 Tab  1 Tab Oral Q4H PRN    Vancomycin Pharmacy to Dose   Other Rx Dosing/Monitoring     ______________________________________________________________________  EXPECTED LENGTH OF STAY: - - -  ACTUAL LENGTH OF STAY:          3                 Raji Davis MD

## 2018-10-01 NOTE — OP NOTES
[address] [place]   OPERATIVE REPORT    Smita Suh  MR#: [id number]  : 1965  ACCOUNT #: [de-identified]   DATE OF SERVICE: 10/01/2018    SURGEON:  Sumit Li DPM     PREOPERATIVE DIAGNOSIS:  Chronic left heel and left dorsal foot venous insufficiency type ulcers. POSTOPERATIVE DIAGNOSIS:  Chronic left heel and left dorsal foot venous insufficiency type ulcers. PROCEDURE PERFORMED:  Excisional debridement of left foot and heel venous insufficiency type ulcers with application of Grafix placental membrane graft. INTRAOPERATIVE INJECTABLES:  20 mL of 0.5% Marcaine plain. COMPLICATIONS:  None. ASSISTANT:  none      ANESTHESIA:  General.    ESTIMATED BLOOD LOSS:  Less than 20ml    SPECIMENS REMOVED:  Soft tissue    IMPLANTS:  none     DESCRIPTION OF PROCEDURE:  After the patient was properly identified and the correct extremity marked, the patient was taken from the preoperative holding area to the operating room and placed on the operating room table in the normal supine fashion and fastened with a lap belt. Dr. Nilesh Higgins then entered the operating room and identified the patient as the aforementioned patient. Once general anesthesia was initiated, the patient's left foot was anesthetized with 20 mL of 0.5% Marcaine plain. The patient's left foot was prepped and draped in normal sterile manner. Upon physical examination, the patient was noted to have 2 wounds on the left foot, 1 on the medial heel and 1 over the dorsal second and third interspaces, which had been present for at least a year. The patient had failed treatments, including local wound care with debridements, antibiotics as well as treatment at the [address] [place] [address]. He was subsequently admitted for cellulitis over the last few days and I recommended the patient have excisional debridement and during this, application of a placental membrane graft to help with wound healing.     Attention was directed to the patient's left foot, whereby an Esmarch bandage was utilized to exsanguinate the left lower extremity. Pneumatic ankle tourniquet inflated to 250 mmHg. Next, first a 15 blade was utilized to make an excisional incision around the entire wound on the medial aspect of the heel as well as the dorsal aspect of the left foot. The wound was removed of all slough and nonviable tissue down through full-thickness skin and subcutaneous tissues to a healthy bleeding base. All fibrinous necrotic slough, nonviable and infected soft tissue was removed, including fat and skin down to tendon. Each of these wounds measured approximately 2.5 x 2.5 cm in size with a depth of approximately 2 mm. After debridement, the wounds measured 2.7 x 2.7 cm by 4 mm in depth. Hemostasis was achieved with pressure and compression. After hemostasis was achieved, the Grafix placental membrane graft was obtained from the back table after soaking in a saline solution. It was cut into 2 pieces, placed over each of the wounds, fashioned into the shape of the wounds and adhered into place with Adaptic and Steri-Strips, followed by a dry sterile dressing and a 3-layer multilayer compression wrap to manage the edema. The patient's ankle tourniquet was released. Immediate hyperemia was noted to all digits of the patient's left foot. The patient tolerated the procedure well and was transferred from the operating room to the recovery room with vital signs stable and vascular status intact to his left lower extremity. After spending adequate time in the PACU, the patient was transferred back to the floor for further medical management and IV antibiotic therapy. Please note that soft tissue cultures, both aerobic and anaerobic, were taken before application of the graft.       JENAE Taylor / RICH  D: 10/01/2018 13:53     T: 10/01/2018 15:00  JOB #: [id number]  CC: Gary Cai MD  CC: Violet Vasquez MD

## 2018-10-01 NOTE — BRIEF OP NOTE
BRIEF OPERATIVE NOTE    Date of Procedure: 10/1/2018   Preoperative Diagnosis: infected left foot  Postoperative Diagnosis: infected left foot    Procedure(s):  LEFT FOOT WOUND AND LEFT HEEL EXCISIONAL DEBRIDEMENT AND GRAFIX APPLICATION TO LEFT FOOT AND LEFT HEEL. Surgeon(s) and Role:     * Baltazar Sultana DPM - Primary         Surgical Assistant: none    Surgical Staff:  Circ-1: Cl Morse RN  Scrub RN-1: Sami Warner RN  Scrub RN-Relief: Isabella Gallardo RN  Event Time In   Incision Start 1328   Incision Close      Anesthesia: General   Estimated Blood Loss: less than 20ml  Specimens:   ID Type Source Tests Collected by Time Destination   1 : LEFT FOOT WOUND Wound Foot, left CULTURE, ANAEROBIC AND AEROBIC Baltazar Sultana DPM 10/1/2018 1344 Microbiology      Findings: chronic heel and dorsal left foot ulcers   Complications: none  Implants:   Implant Name Type Inv.  Item Serial No.  Lot No. LRB No. Used Action   GRAFT MEMBRN CRYO PRME 5X5CM -- GRAFIX PRIME - SN/A   GRAFT MEMBRN CRYO PRME 5X5CM -- GRAFIX PRIME N/A [employer] [id number] Left 1 Implanted

## 2018-10-01 NOTE — PROGRESS NOTES
TRANSFER - IN REPORT:    Verbal report received from Meliza(name) on Samuel Rodgers  being received from PACU(unit) for routine post - op      Report consisted of patients Situation, Background, Assessment and   Recommendations(SBAR). Information from the following report(s) SBAR, Kardex, OR Summary and Recent Results was reviewed with the receiving nurse. Opportunity for questions and clarification was provided. Assessment completed upon patients arrival to unit and care assumed.

## 2018-10-01 NOTE — PROGRESS NOTES
Bedside and Verbal shift change report given to Lonzie Spurling (oncoming nurse) by Mary Butcher RN (offgoing nurse). Report included the following information SBAR, Kardex, ED Summary, Intake/Output, MAR and Recent Results.

## 2018-10-01 NOTE — PERIOP NOTES
Patient having severe pain and DBP also running high >90s. Dr. Ranulfo Renteria notified and in to see patient. Ordered Valium 5mg to work with narcotics to ease patient's pain and anxiety about pain. Continue to medicate patient as needed for pain and monitor BP.       1509: Patient appears more comfortable and calmer. Still rates pain high but is able to sleep at intervals. BP remains elevated. Dr. Ranulfo Renteria notified and Labetalol 10mg IV given. Will monitor.

## 2018-10-01 NOTE — ROUTINE PROCESS
Bedside and Verbal shift change report given to EMERY CARRANZA (oncoming nurse) by Rita Loyd RN (offgoing nurse). Report included the following information SBAR, Kardex, Intake/Output, MAR, Accordion and Recent Results.

## 2018-10-01 NOTE — ROUTINE PROCESS
TRANSFER - IN REPORT:    Verbal report received from Jazz(name) on Goldy Brina  being received from 5E(unit) for ordered procedure      Report consisted of patients Situation, Background, Assessment and   Recommendations(SBAR). Information from the following report(s) SBAR, Intake/Output and MAR was reviewed with the receiving nurse. Opportunity for questions and clarification was provided. Assessment completed upon patients arrival to unit and care assumed.

## 2018-10-01 NOTE — PROGRESS NOTES
Pharmacist Note - Vancomycin Dosing  Therapy day 3  Indication: Infection of Left foot ulcer  Current regimen: 1750 mg IV q 12 h    A Trough Level resulted at 14.3 mcg/mL which was obtained 10.5 hrs post-dose. The extrapolated \"true\" trough is approximately 12.32 mcg/mL based on the patient's known kinetics. Goal trough: 10 - 15 mcg/mL     Plan: Continue current regimen although monitor carefully for today's jump in Scr. Pharmacy will continue to monitor this patient daily for changes in clinical status and renal function.

## 2018-10-01 NOTE — PERIOP NOTES
TRANSFER - OUT REPORT:    Verbal report given to Jazz(name) on Leilani Glass  being transferred to [facility](unit) for routine post - op       Report consisted of patients Situation, Background, Assessment and   Recommendations(SBAR). Time Pre op antibiotic given:na  Anesthesia Stop time: na  Gross Present on Transfer to floor:no  Order for Gross on Chart:no  Discharge Prescriptions with Chart:no    Information from the following report(s) SBAR, Kardex, OR Summary, Procedure Summary, Intake/Output, MAR, Recent Results and Med Rec Status was reviewed with the receiving nurse. Opportunity for questions and clarification was provided. Is the patient on 02? YES       L/Min 2       Other     Is the patient on a monitor? NO    Is the nurse transporting with the patient? NO    Surgical Waiting Area notified of patient's transfer from PACU? NO      The following personal items collected during your admission accompanied patient upon transfer:   Dental Appliance: Dental Appliances: (S) Other (comment) (front chipped tooth)  Vision: Visual Aid: None  Hearing Aid:    Jewelry:    Clothing: Clothing:  (inpatient)  Other Valuables:    Valuables sent to safe:      No belongings in PACU. No family present.

## 2018-10-01 NOTE — ANESTHESIA PREPROCEDURE EVALUATION
Anesthetic History   No history of anesthetic complications            Review of Systems / Medical History  Patient summary reviewed, nursing notes reviewed and pertinent labs reviewed    Pulmonary    COPD               Neuro/Psych     seizures  CVA  TIA and psychiatric history     Cardiovascular    Hypertension                   GI/Hepatic/Renal  Within defined limits              Endo/Other      Hypothyroidism       Other Findings              Physical Exam    Airway  Mallampati: II  TM Distance: > 6 cm  Neck ROM: normal range of motion   Mouth opening: Normal     Cardiovascular  Regular rate and rhythm,  S1 and S2 normal,  no murmur, click, rub, or gallop             Dental  No notable dental hx       Pulmonary  Breath sounds clear to auscultation               Abdominal  GI exam deferred       Other Findings            Anesthetic Plan    ASA: 2  Anesthesia type: general          Induction: Intravenous  Anesthetic plan and risks discussed with: Patient

## 2018-10-01 NOTE — PROGRESS NOTES
Tried to visit patient for face to face assessment but patient off floor to OR for I&D of foot wound. Will attempt to meet with patient later in the day.     [phone]

## 2018-10-02 LAB
ANION GAP SERPL CALC-SCNC: 9 MMOL/L (ref 5–15)
BUN SERPL-MCNC: 10 MG/DL (ref 6–20)
BUN/CREAT SERPL: 14 (ref 12–20)
CALCIUM SERPL-MCNC: 8.6 MG/DL (ref 8.5–10.1)
CHLORIDE SERPL-SCNC: 103 MMOL/L (ref 97–108)
CO2 SERPL-SCNC: 25 MMOL/L (ref 21–32)
CREAT SERPL-MCNC: 0.73 MG/DL (ref 0.7–1.3)
GLUCOSE SERPL-MCNC: 195 MG/DL (ref 65–100)
POTASSIUM SERPL-SCNC: 4.2 MMOL/L (ref 3.5–5.1)
SODIUM SERPL-SCNC: 137 MMOL/L (ref 136–145)
TSH SERPL DL<=0.05 MIU/L-ACNC: 3.56 UIU/ML (ref 0.36–3.74)

## 2018-10-02 PROCEDURE — 36415 COLL VENOUS BLD VENIPUNCTURE: CPT | Performed by: PODIATRIST

## 2018-10-02 PROCEDURE — 74011250636 HC RX REV CODE- 250/636: Performed by: PODIATRIST

## 2018-10-02 PROCEDURE — 65270000029 HC RM PRIVATE

## 2018-10-02 PROCEDURE — 84443 ASSAY THYROID STIM HORMONE: CPT | Performed by: PODIATRIST

## 2018-10-02 PROCEDURE — 80048 BASIC METABOLIC PNL TOTAL CA: CPT | Performed by: HOSPITALIST

## 2018-10-02 PROCEDURE — 74011250637 HC RX REV CODE- 250/637: Performed by: HOSPITALIST

## 2018-10-02 PROCEDURE — 74011250636 HC RX REV CODE- 250/636: Performed by: HOSPITALIST

## 2018-10-02 PROCEDURE — 74011000258 HC RX REV CODE- 258: Performed by: INTERNAL MEDICINE

## 2018-10-02 PROCEDURE — 74011250636 HC RX REV CODE- 250/636: Performed by: INTERNAL MEDICINE

## 2018-10-02 RX ADMIN — HYDROMORPHONE HYDROCHLORIDE 1 MG: 2 INJECTION INTRAMUSCULAR; INTRAVENOUS; SUBCUTANEOUS at 06:45

## 2018-10-02 RX ADMIN — OXYCODONE AND ACETAMINOPHEN 1 TABLET: 5; 325 TABLET ORAL at 05:02

## 2018-10-02 RX ADMIN — OXYCODONE AND ACETAMINOPHEN 1 TABLET: 5; 325 TABLET ORAL at 15:09

## 2018-10-02 RX ADMIN — METHADONE HYDROCHLORIDE 115 MG: 5 SOLUTION ORAL at 09:12

## 2018-10-02 RX ADMIN — CEFTRIAXONE 1 G: 1 INJECTION, POWDER, FOR SOLUTION INTRAMUSCULAR; INTRAVENOUS at 19:08

## 2018-10-02 RX ADMIN — LISINOPRIL 20 MG: 20 TABLET ORAL at 09:12

## 2018-10-02 RX ADMIN — OXYCODONE AND ACETAMINOPHEN 1 TABLET: 5; 325 TABLET ORAL at 23:14

## 2018-10-02 RX ADMIN — HYDROMORPHONE HYDROCHLORIDE 1 MG: 2 INJECTION INTRAMUSCULAR; INTRAVENOUS; SUBCUTANEOUS at 16:12

## 2018-10-02 RX ADMIN — TAMSULOSIN HYDROCHLORIDE 0.4 MG: 0.4 CAPSULE ORAL at 09:12

## 2018-10-02 RX ADMIN — LEVOTHYROXINE SODIUM 88 MCG: 88 TABLET ORAL at 06:48

## 2018-10-02 RX ADMIN — OXYCODONE AND ACETAMINOPHEN 1 TABLET: 5; 325 TABLET ORAL at 19:07

## 2018-10-02 RX ADMIN — Medication 10 ML: at 22:00

## 2018-10-02 RX ADMIN — HYDROMORPHONE HYDROCHLORIDE 1 MG: 2 INJECTION INTRAMUSCULAR; INTRAVENOUS; SUBCUTANEOUS at 13:07

## 2018-10-02 RX ADMIN — Medication 10 ML: at 14:22

## 2018-10-02 RX ADMIN — HYDROMORPHONE HYDROCHLORIDE 1 MG: 2 INJECTION INTRAMUSCULAR; INTRAVENOUS; SUBCUTANEOUS at 20:18

## 2018-10-02 RX ADMIN — VANCOMYCIN HYDROCHLORIDE 1750 MG: 10 INJECTION, POWDER, LYOPHILIZED, FOR SOLUTION INTRAVENOUS at 16:11

## 2018-10-02 RX ADMIN — HYDROMORPHONE HYDROCHLORIDE 1 MG: 2 INJECTION INTRAMUSCULAR; INTRAVENOUS; SUBCUTANEOUS at 03:20

## 2018-10-02 RX ADMIN — OXYCODONE AND ACETAMINOPHEN 1 TABLET: 5; 325 TABLET ORAL at 00:50

## 2018-10-02 RX ADMIN — ZOLPIDEM TARTRATE 5 MG: 5 TABLET ORAL at 20:18

## 2018-10-02 RX ADMIN — COLCHICINE 0.6 MG: 0.6 TABLET, FILM COATED ORAL at 09:12

## 2018-10-02 RX ADMIN — OXYCODONE AND ACETAMINOPHEN 1 TABLET: 5; 325 TABLET ORAL at 09:12

## 2018-10-02 RX ADMIN — Medication 10 ML: at 06:45

## 2018-10-02 RX ADMIN — VANCOMYCIN HYDROCHLORIDE 1750 MG: 10 INJECTION, POWDER, LYOPHILIZED, FOR SOLUTION INTRAVENOUS at 03:33

## 2018-10-02 NOTE — PROGRESS NOTES
Bedside shift change report given to Marlee Mayen RN (oncoming nurse) by EMERY Tilley (offgoing nurse). Report included the following information SBAR, Kardex, Intake/Output and MAR.

## 2018-10-02 NOTE — PROGRESS NOTES
Hospitalist Progress Note  Chiquita Guzman MD  Answering service: [phone] OR 36 from in house phone  Cell: [phone]      Date of Service:  10/2/2018  NAME:  Lui Rehman  :  1965  MRN:  [id number]      Admission Summary:   A 47 y.o man with a history of CVA, HTN, chronic pain, chronic left foot ulcer who presents with worsening of his ulcers. He reports an ulcer on the dorsum of his left foot that is reportedly the result of trauma that has been present for about a year, and a few weeks ago developed another ulcer on the medial aspect of the foot. He reports following in a wound care clinic and recently being prescribed ciprofloxacin and clindamycin which did not help. For the past week, the ulcers have been increasingly more painful and he noted yellow drainage from them. He denies fevers. Interval history / Subjective:   First encounter  Patient seen and examined by the bedside  Labs, images and notes reviewed  Patient is comfortable, pain is tolerable. Pt is concerned regarding his placement since he doesn't want to go back to his apartment which is on the second floor. Denies any chest pain  No fevers or chills  No nausea or vomiting  Discussed with nursing staff, no acute issues overnight, orders reviewed. Assessment & Plan:     Chronic left heel infected ulcer (POA) s/p left foot wound and left heel debridement and Grafix application  By Dr. Vipin Lepe  -?traumatic, appear mildly infected with surrounding cellulitic changes at medial side of left foot and dorsal side anteriorly foot  - not improve with clindamycin and ciprofloxacin PO.  -wound cx, final pending  -IV vancomycin  - added Ceftriaxone for GNR coverage given the wound culture report  -x ray right foot  soft tissue swelling along the dorsum of the foot. .  -wound care consulted  -Podiatrist is on board     HTN  -continue lisinopril and monitor BP    Sinus bradycardia  -asymptomatic  -HR 56-62     Chronic pain  -on  methadone  -PRN Percocet for acute pain     Gout  -stable, Allopurinol      Bladder cancer  -s/p surgery in April 2018  -stable     Hypothyroidism  -synthroid   -euthyroid, TSH WNL    Hx of hemorrhagic stroke with left sided hemiparesis   -patient uses wheelchair and walker. But unable to do so now due to non weight bearing on the left as per podiatry    -continue supportive care      Code status: Full Code  DVT prophylaxis: SCD    Care Plan discussed with: Patient/Family and Nurse   FARIDEH POLANCO, working on the placement  Disposition: D     Hospital Problems  Date Reviewed: 10/1/2018          Codes Class Noted POA    * (Principal)Foot ulcer ([facility] [address] ICD-10-CM: L97.509  ICD-9-CM: 707.15  9/28/2018 Unknown        Stroke ([facility] [address] ICD-10-CM: I63.9  ICD-9-CM: 434.91  3/11/2011 Yes        Hypertension ICD-10-CM: I10  ICD-9-CM: 401.9  3/11/2011 Yes              Vital Signs:    Last 24hrs VS reviewed since prior progress note. Most recent are:  Visit Vitals    /85    Pulse 78    Temp 98.3 °F (36.8 °C)    Resp 18    Ht 6' 1\" (1.854 m)    Wt 103.4 kg (228 lb)    SpO2 98%    BMI 30.08 kg/m2         Intake/Output Summary (Last 24 hours) at 10/02/18 1739  Last data filed at 10/02/18 1210   Gross per 24 hour   Intake                0 ml   Output             2395 ml   Net            -2395 ml        Physical Examination:             Constitutional:  No acute distress, cooperative, pleasant    ENT:  Oral mucous moist, oropharynx benign. Neck supple,    Resp:  CTA bilaterally. No wheezing/rhonchi/rales. No accessory muscle use   CV:  Regular rhythm, normal rate, no murmurs, gallops, rubs    GI:  Soft, non distended, non tender.  normoactive bowel sounds, no hepatosplenomegaly     Musculoskeletal:  No edema, left foot ulcer wound with some discharge and surrounding erythema, currently dressed    Neurologic:  Conscious and well oriented, right extremities 5/5, LUE 1/5, LLE 4/5     Psych:  Good insight, Not anxious nor agitated. Data Review:    Review and/or order of clinical lab test      Labs:     Recent Labs      09/30/18   0529   WBC  7.3   HGB  12.0*   HCT  37.3   PLT  190     Recent Labs      10/02/18   0333  10/01/18   0050  09/30/18   0529   NA  137  140  142   K  4.2  4.0  4.0   CL  103  105  107   CO2  25  27  26   BUN  10  9  7   CREA  0.73  0.95  0.66*   GLU  195*  129*  110*   CA  8.6  8.0*  7.9*   MG   --   1.8   --      Recent Labs      09/30/18   0529   SGOT  13*   ALT  17   AP  145*   TBILI  0.3   TP  6.1*   ALB  3.0*   GLOB  3.1     No results for input(s): INR, PTP, APTT in the last 72 hours. No lab exists for component: INREXT, INREXT   No results for input(s): FE, TIBC, PSAT, FERR in the last 72 hours. Lab Results   Component Value Date/Time    Folate 8.6 12/13/2012 03:20 AM      No results for input(s): PH, PCO2, PO2 in the last 72 hours. No results for input(s): CPK, CKNDX, TROIQ in the last 72 hours.     No lab exists for component: CPKMB  Lab Results   Component Value Date/Time    Cholesterol, total 152 12/13/2012 03:30 AM    HDL Cholesterol 26 12/13/2012 03:30 AM    LDL, calculated 92.2 12/13/2012 03:30 AM    Triglyceride 169 (H) 12/13/2012 03:30 AM    CHOL/HDL Ratio 5.8 (H) 12/13/2012 03:30 AM     Lab Results   Component Value Date/Time    Glucose (POC) 150 (H) 03/19/2018 11:45 AM    Glucose (POC) 123 (H) 03/18/2018 08:55 PM    Glucose (POC) 96 03/16/2018 04:58 PM    Glucose (POC) 116 (H) 12/12/2012 12:42 PM    Glucose (POC) 99 12/11/2012 11:27 PM     Lab Results   Component Value Date/Time    Color YELLOW/STRAW 07/31/2018 05:52 PM    Appearance CLEAR 07/31/2018 05:52 PM    Specific gravity 1.006 07/31/2018 05:52 PM    pH (UA) 6.0 07/31/2018 05:52 PM    Protein NEGATIVE  07/31/2018 05:52 PM    Glucose NEGATIVE  07/31/2018 05:52 PM    Ketone NEGATIVE  07/31/2018 05:52 PM    Bilirubin NEGATIVE  07/31/2018 05:52 PM    Urobilinogen 0.2 07/31/2018 05:52 PM    Nitrites NEGATIVE  07/31/2018 05:52 PM    Leukocyte Esterase NEGATIVE  07/31/2018 05:52 PM    Epithelial cells FEW 07/31/2018 05:52 PM    Bacteria NEGATIVE  07/31/2018 05:52 PM    WBC 0-4 07/31/2018 05:52 PM    RBC 0-5 07/31/2018 05:52 PM     All Micro Results     Procedure Component Value Units Date/Time    CULTURE, Margurite Husky STAIN [[id number]]  (Abnormal) Collected:  09/29/18 0041    Order Status:  Completed Specimen:  Wound from Foot Updated:  10/02/18 1303     Special Requests: NO SPECIAL REQUESTS        GRAM STAIN NO ORGANISMS SEEN        Culture result: HEAVY DIPHTHEROIDS (A)                 LIGHT GRAM NEGATIVE RODS (A)    CULTURE, Margurite Husky STAIN [[id number]]  (Abnormal) Collected:  10/01/18 1347    Order Status:  Completed Specimen:  Wound Updated:  10/02/18 1244     Special Requests: Antonio Vázquez     GRAM STAIN RARE WBCS SEEN         NO ORGANISMS SEEN        Culture result: LIGHT DIPHTHEROIDS (A)                 CHECKING FOR POSSIBLE GRAM NEGATIVE RODS (A)    CULTURE, ANAEROBIC [[id number]] Collected:  10/01/18 1347    Order Status:  Completed Updated:  10/01/18 2215            Medications Reviewed:     Current Facility-Administered Medications   Medication Dose Route Frequency    HYDROmorphone (DILAUDID) injection 1 mg  1 mg IntraVENous Q3H PRN    colchicine tablet 0.6 mg  0.6 mg Oral DAILY    zolpidem (AMBIEN) tablet 5 mg  5 mg Oral QHS PRN    vancomycin (VANCOCIN) 1750 mg in  ml infusion  1,750 mg IntraVENous Q12H    methadone (DOLOPHINE) 5 mg/5 mL oral solution 115 mg  115 mg Oral DAILY    levothyroxine (SYNTHROID) tablet 88 mcg  88 mcg Oral ACB    lisinopril (PRINIVIL, ZESTRIL) tablet 20 mg  20 mg Oral DAILY    tamsulosin (FLOMAX) capsule 0.4 mg  0.4 mg Oral DAILY    sodium chloride (NS) flush 5-10 mL  5-10 mL IntraVENous Q8H    sodium chloride (NS) flush 5-10 mL  5-10 mL IntraVENous PRN    acetaminophen (TYLENOL) tablet 650 mg  650 mg Oral Q4H PRN    ondansetron (ZOFRAN) injection 4 mg  4 mg IntraVENous Q4H PRN    oxyCODONE-acetaminophen (PERCOCET) 5-325 mg per tablet 1 Tab  1 Tab Oral Q4H PRN    Vancomycin Pharmacy to Dose   Other Rx Dosing/Monitoring     ______________________________________________________________________  EXPECTED LENGTH OF STAY: 4d 0h  ACTUAL LENGTH OF STAY:          4                 Mabel Rajan MD

## 2018-10-02 NOTE — PROGRESS NOTES
Progress Note    Patient: Emmanuel Thomas MRN: [id number]  SSN: [id number]    YOB: 1965  Age: 47 y.o. Sex: male      Admit Date: 9/28/2018  1 Day Post-Op     Procedure:   Procedure(s):  LEFT FOOT WOUND AND LEFT HEEL  DEBRIDEMENT AND GRAFIX APPLICATION TO LEFT FOOT AND LEFT HEEL. Subjective:     Patient seen resting quiet and comfortably and no apparent distress. Pain on scale of 1-10 is an 8. Status post: left heel and foot ulcer debridements with Grafix application    Objective:     Visit Vitals    /81    Pulse 82    Temp 98 °F (36.7 °C)    Resp 16    Ht 6' 1\" (1.854 m)    Wt 103.4 kg (228 lb)    SpO2 95%    BMI 30.08 kg/m2        Physical Exam:  normal DP and PT pulses and reduced sensation at left foot with intact 3 layer wrap without strikethrough    Labs/Radiology Review: images and reports reviewed    Assessment:     Hospital Problems  Date Reviewed: 10/1/2018          Codes Class Noted POA    * (Principal)[facility] ICD-10-CM: T53.083  ICD-9-CM: 707.15  9/28/2018 Unknown        Stroke ([facility] [address] ICD-10-CM: I63.9  ICD-9-CM: 434.91  3/11/2011 Yes        Hypertension ICD-10-CM: I10  ICD-9-CM: 401.9  3/11/2011 Yes              Plan/Recommendations/Medical Decision Making:     Continue present treatment    Activity: elevate foot    Discontinue: n/a    Diet: regular    Education: none   Continue elevation of foot; PWB with surgical shoe  PT consulted  No need to change dressing. Graft in place for one week from application. Continue IV ABX. Cultures pending. Smoking cessation with Chantix. May need ABX and wound care at [facility] after DC.   Will follow    Signed By: Cale Brandon DPM     October 2, 2018

## 2018-10-02 NOTE — PROGRESS NOTES
Bedside shift change report given to  oncoming nurse) by Porter Melendez (offgoing nurse). Report included the following information SBAR, Kardex and Med Rec Status   .

## 2018-10-02 NOTE — ANESTHESIA POSTPROCEDURE EVALUATION
Post-Anesthesia Evaluation and Assessment    Patient: Goldy Gonsalves MRN: [id number]  SSN: [id number]    YOB: 1965  Age: 47 y.o. Sex: male       Cardiovascular Function/Vital Signs  Visit Vitals    BP (!) 168/96 (BP 1 Location: Right arm, BP Patient Position: At rest)    Pulse (!) 57    Temp 36.4 °C (97.5 °F)    Resp 18    Ht 6' 1\" (1.854 m)    Wt 103.4 kg (228 lb)    SpO2 96%    BMI 30.08 kg/m2       Patient is status post general anesthesia for Procedure(s):  LEFT FOOT WOUND AND LEFT HEEL  DEBRIDEMENT AND GRAFIX APPLICATION TO LEFT FOOT AND LEFT HEEL. .    Nausea/Vomiting: None    Postoperative hydration reviewed and adequate. Pain:  Pain Scale 1: Numeric (0 - 10) (10/02/18 1509)  Pain Intensity 1: 8 (10/02/18 1509)   Managed    Neurological Status:   Neuro (WDL): Exceptions to WDL (10/01/18 1410)  Neuro  LUE Motor Response: Floppy; Flaccid; No movement to any stimulus (10/02/18 1210)  LLE Motor Response: Numbness;Tingling (10/02/18 1210)  RUE Motor Response: Purposeful (10/02/18 1210)  RLE Motor Response: Purposeful (10/02/18 1210)   At baseline    Mental Status and Level of Consciousness: Arousable    Pulmonary Status:   O2 Device: Room air (10/02/18 1210)   Adequate oxygenation and airway patent    Complications related to anesthesia: None    Post-anesthesia assessment completed.  No concerns    Signed By: Zackary Owusu MD     October 2, 2018

## 2018-10-02 NOTE — PROGRESS NOTES
Reason for Admission:   Foot Ulcer                RRAT Score:   23               Resources/supports as identified by patient/family:   No identified supports. Patient mentioned a roommate/girlfriend that is no longer a support system for him. Patient was vague, stating that a friend or daughter may be able to assist, but Sravanthi Calloway a lot going on. \"                 Top Challenges facing patient (as identified by patient/family and CM):  Health challenges, mobility challenges     Finances/Medication cost?  Patient receives $750/month in disability income      Transportation? Patient may need ambulance transportation at discharge. He is PWB with surgical shoe per Podiatry recommendations. Support system or lack thereof? Patient did not indicate support from any friends/family members. Living arrangements? Patient lives with a roommate/girlfriend, but explained that this is \"no longer a good situation. \"      Self-care/ADLs/Cognition? Patient may require assistance with self-care at this point. Current Advanced Directive/Advance Care Plan:  No AMD on file. Plan for utilizing home health:  Patient will likely need SNF rehabilitation. If SNF rehabilitation is not authroized by his [employer], he will need a home health order for [place] [facility]/SN for mediation reconciliation. Likelihood of readmission:  Low                  Transition of Care Plan:  SNF rehabilitation. Patient was provided with a list of skilled nursing facilities today. Followed up this afternoon, and he had still not chosen any facilities to which this  can send a referral.  Patient later chose two facilities and referrals were placed to [facility] and [address] [facility] via [facility]; awaiting responses. Insurance authorization will need to be obtained by any accepting facility. Reviewed medical chart; met with the patient at the bedside.   Patient lives with a roommate. He uses a cane to ambulate. He receives disability income. He has a PCP - Dr. Leticia Austin and gets his prescriptions at [facility]. The patient has a  through [place] [employer], SAMANTHA Walsh#[phone] who this  spoke with today. She is aware that the plan is SNF rehabilitation for this patient. Care Management will continue to follow his disposition. JUVENCIO Cross     Care Management Interventions  PCP Verified by CM:  Yes  Palliative Care Criteria Met (RRAT>21 & CHF Dx)?: No  Mode of Transport at Discharge:  (Patient will need ambulance transportation at discharge.  )  MyChart Signup: No  Discharge Durable Medical Equipment: No  Physical Therapy Consult: Yes  Occupational Therapy Consult: Yes  Speech Therapy Consult: No  Current Support Network:  (Patient lives with a roommate.  )  Confirm Follow Up Transport:  (Patient will need ambulance transportation at discharge.)  Plan discussed with Pt/Family/Caregiver: Yes  Freedom of Choice Offered: Yes  Discharge Location  Discharge Placement: Skilled nursing facility

## 2018-10-02 NOTE — PROGRESS NOTES
Bedside and Verbal shift change report given to Tamara Dumont RN (oncoming nurse) by Leonard Yuen RN (offgoing nurse). Report included the following information SBAR, Kardex, Intake/Output, MAR, Accordion, Recent Results and Med Rec Status.

## 2018-10-03 LAB
ANION GAP SERPL CALC-SCNC: 8 MMOL/L (ref 5–15)
BASOPHILS # BLD: 0.1 K/UL (ref 0–0.1)
BASOPHILS NFR BLD: 0 % (ref 0–1)
BUN SERPL-MCNC: 10 MG/DL (ref 6–20)
BUN/CREAT SERPL: 14 (ref 12–20)
CALCIUM SERPL-MCNC: 8.2 MG/DL (ref 8.5–10.1)
CHLORIDE SERPL-SCNC: 106 MMOL/L (ref 97–108)
CO2 SERPL-SCNC: 27 MMOL/L (ref 21–32)
CREAT SERPL-MCNC: 0.71 MG/DL (ref 0.7–1.3)
DIFFERENTIAL METHOD BLD: ABNORMAL
EOSINOPHIL # BLD: 0.3 K/UL (ref 0–0.4)
EOSINOPHIL NFR BLD: 2 % (ref 0–7)
ERYTHROCYTE [DISTWIDTH] IN BLOOD BY AUTOMATED COUNT: 16.2 % (ref 11.5–14.5)
GLUCOSE SERPL-MCNC: 107 MG/DL (ref 65–100)
HCT VFR BLD AUTO: 37.4 % (ref 36.6–50.3)
HGB BLD-MCNC: 12.1 G/DL (ref 12.1–17)
IMM GRANULOCYTES # BLD: 0.1 K/UL (ref 0–0.04)
IMM GRANULOCYTES NFR BLD AUTO: 1 % (ref 0–0.5)
LYMPHOCYTES # BLD: 3.2 K/UL (ref 0.8–3.5)
LYMPHOCYTES NFR BLD: 23 % (ref 12–49)
MAGNESIUM SERPL-MCNC: 2 MG/DL (ref 1.6–2.4)
MCH RBC QN AUTO: 28.7 PG (ref 26–34)
MCHC RBC AUTO-ENTMCNC: 32.4 G/DL (ref 30–36.5)
MCV RBC AUTO: 88.8 FL (ref 80–99)
MONOCYTES # BLD: 1 K/UL (ref 0–1)
MONOCYTES NFR BLD: 7 % (ref 5–13)
NEUTS SEG # BLD: 9.2 K/UL (ref 1.8–8)
NEUTS SEG NFR BLD: 66 % (ref 32–75)
NRBC # BLD: 0 K/UL (ref 0–0.01)
NRBC BLD-RTO: 0 PER 100 WBC
PLATELET # BLD AUTO: 233 K/UL (ref 150–400)
PMV BLD AUTO: 10.6 FL (ref 8.9–12.9)
POTASSIUM SERPL-SCNC: 4.3 MMOL/L (ref 3.5–5.1)
RBC # BLD AUTO: 4.21 M/UL (ref 4.1–5.7)
SODIUM SERPL-SCNC: 141 MMOL/L (ref 136–145)
WBC # BLD AUTO: 13.9 K/UL (ref 4.1–11.1)

## 2018-10-03 PROCEDURE — 74011250636 HC RX REV CODE- 250/636: Performed by: HOSPITALIST

## 2018-10-03 PROCEDURE — 74011250637 HC RX REV CODE- 250/637: Performed by: HOSPITALIST

## 2018-10-03 PROCEDURE — 36415 COLL VENOUS BLD VENIPUNCTURE: CPT | Performed by: INTERNAL MEDICINE

## 2018-10-03 PROCEDURE — 80048 BASIC METABOLIC PNL TOTAL CA: CPT | Performed by: INTERNAL MEDICINE

## 2018-10-03 PROCEDURE — 97165 OT EVAL LOW COMPLEX 30 MIN: CPT

## 2018-10-03 PROCEDURE — 74011250636 HC RX REV CODE- 250/636: Performed by: INTERNAL MEDICINE

## 2018-10-03 PROCEDURE — 74011250636 HC RX REV CODE- 250/636: Performed by: PODIATRIST

## 2018-10-03 PROCEDURE — 85025 COMPLETE CBC W/AUTO DIFF WBC: CPT | Performed by: INTERNAL MEDICINE

## 2018-10-03 PROCEDURE — 65270000029 HC RM PRIVATE

## 2018-10-03 PROCEDURE — 97535 SELF CARE MNGMENT TRAINING: CPT

## 2018-10-03 PROCEDURE — 83735 ASSAY OF MAGNESIUM: CPT | Performed by: INTERNAL MEDICINE

## 2018-10-03 PROCEDURE — 74011000258 HC RX REV CODE- 258: Performed by: INTERNAL MEDICINE

## 2018-10-03 RX ADMIN — HYDROMORPHONE HYDROCHLORIDE 1 MG: 2 INJECTION INTRAMUSCULAR; INTRAVENOUS; SUBCUTANEOUS at 23:14

## 2018-10-03 RX ADMIN — COLCHICINE 0.6 MG: 0.6 TABLET, FILM COATED ORAL at 08:38

## 2018-10-03 RX ADMIN — OXYCODONE AND ACETAMINOPHEN 1 TABLET: 5; 325 TABLET ORAL at 19:39

## 2018-10-03 RX ADMIN — VANCOMYCIN HYDROCHLORIDE 1750 MG: 10 INJECTION, POWDER, LYOPHILIZED, FOR SOLUTION INTRAVENOUS at 15:47

## 2018-10-03 RX ADMIN — HYDROMORPHONE HYDROCHLORIDE 1 MG: 2 INJECTION INTRAMUSCULAR; INTRAVENOUS; SUBCUTANEOUS at 04:04

## 2018-10-03 RX ADMIN — Medication 10 ML: at 23:13

## 2018-10-03 RX ADMIN — METHADONE HYDROCHLORIDE 115 MG: 5 SOLUTION ORAL at 08:38

## 2018-10-03 RX ADMIN — LEVOTHYROXINE SODIUM 88 MCG: 88 TABLET ORAL at 07:07

## 2018-10-03 RX ADMIN — OXYCODONE AND ACETAMINOPHEN 1 TABLET: 5; 325 TABLET ORAL at 05:11

## 2018-10-03 RX ADMIN — Medication 10 ML: at 07:06

## 2018-10-03 RX ADMIN — HYDROMORPHONE HYDROCHLORIDE 1 MG: 2 INJECTION INTRAMUSCULAR; INTRAVENOUS; SUBCUTANEOUS at 00:38

## 2018-10-03 RX ADMIN — LISINOPRIL 20 MG: 20 TABLET ORAL at 08:38

## 2018-10-03 RX ADMIN — HYDROMORPHONE HYDROCHLORIDE 1 MG: 2 INJECTION INTRAMUSCULAR; INTRAVENOUS; SUBCUTANEOUS at 07:06

## 2018-10-03 RX ADMIN — OXYCODONE AND ACETAMINOPHEN 1 TABLET: 5; 325 TABLET ORAL at 08:21

## 2018-10-03 RX ADMIN — CEFTRIAXONE 1 G: 1 INJECTION, POWDER, FOR SOLUTION INTRAMUSCULAR; INTRAVENOUS at 18:49

## 2018-10-03 RX ADMIN — OXYCODONE AND ACETAMINOPHEN 1 TABLET: 5; 325 TABLET ORAL at 15:40

## 2018-10-03 RX ADMIN — HYDROMORPHONE HYDROCHLORIDE 1 MG: 2 INJECTION INTRAMUSCULAR; INTRAVENOUS; SUBCUTANEOUS at 12:36

## 2018-10-03 RX ADMIN — TAMSULOSIN HYDROCHLORIDE 0.4 MG: 0.4 CAPSULE ORAL at 08:38

## 2018-10-03 RX ADMIN — HYDROMORPHONE HYDROCHLORIDE 1 MG: 2 INJECTION INTRAMUSCULAR; INTRAVENOUS; SUBCUTANEOUS at 17:55

## 2018-10-03 RX ADMIN — VANCOMYCIN HYDROCHLORIDE 1750 MG: 10 INJECTION, POWDER, LYOPHILIZED, FOR SOLUTION INTRAVENOUS at 04:12

## 2018-10-03 NOTE — PROGRESS NOTES
Progress Note    Patient: Eryn Liu MRN: [id number]  SSN: [id number]    YOB: 1965  Age: 47 y.o. Sex: male      Admit Date: 9/28/2018  2 Day Post-Op     Procedure:   Procedure(s):  LEFT FOOT WOUND AND LEFT HEEL  DEBRIDEMENT AND GRAFIX APPLICATION TO LEFT FOOT AND LEFT HEEL. Subjective:     Patient seen resting quiet and comfortably and no apparent distress. Pain on scale of 1-10 is an 10. He relates that he has a lot of pain today but he thinks it is from getting up quickly to get to the bathroom, because he was having his first BM since being in the hospital    Status post: left heel and foot ulcer debridements with Grafix application    Objective:     Visit Vitals    /83 (BP 1 Location: Right arm, BP Patient Position: At rest)    Pulse (!) 59    Temp 98 °F (36.7 °C)    Resp 18    Ht 6' 1\" (1.854 m)    Wt 103.4 kg (228 lb)    SpO2 99%    BMI 30.08 kg/m2        Physical Exam:  normal DP and PT pulses and reduced sensation at left foot with intact 3 layer wrap without strikethrough    Labs/Radiology Review: images and reports reviewed; Cx from OR and admission show GNRs    Assessment:     Hospital Problems  Date Reviewed: 10/1/2018          Codes Class Noted POA    * (Principal)Foot ulcer ([facility] [address] ICD-10-CM: L97.509  ICD-9-CM: 707.15  9/28/2018 Unknown        Stroke [facility]) ICD-10-CM: I63.9  ICD-9-CM: 434.91  3/11/2011 Yes        Hypertension ICD-10-CM: I10  ICD-9-CM: 401.9  3/11/2011 Yes              Plan/Recommendations/Medical Decision Making:     Continue present treatment    Activity: elevate foot    Discontinue: n/a    Diet: regular    Education: none   Continue elevation of foot; PWB with surgical shoe  PT DC'd for now  No need to change dressing. Graft in place for one week from application. Continue IV ABX. Cultures pending. Smoking cessation with Chantix. May need ABX and wound care at [facility] after DC.   Will follow    Signed By: Fadi De Leon DPM     October 3, 2018

## 2018-10-03 NOTE — PROGRESS NOTES
Hospitalist Progress Note  Erlinda Thurman MD  Answering service: [phone] from in house phone  Cell: [phone]      Date of Service:  10/3/2018  NAME:  Megan Mo  :  1965  MRN:  [id number]      Admission Summary:   A 47 y.o man with a history of CVA, HTN, chronic pain, chronic left foot ulcer who presents with worsening of his ulcers. He reports an ulcer on the dorsum of his left foot that is reportedly the result of trauma that has been present for about a year, and a few weeks ago developed another ulcer on the medial aspect of the foot. He reports following in a wound care clinic and recently being prescribed ciprofloxacin and clindamycin which did not help. For the past week, the ulcers have been increasingly more painful and he noted yellow drainage from them. He denies fevers. Interval history / Subjective:     Patient seen and examined by the bedside  Labs, images and notes reviewed  Patient is comfortable,report left foot pain, 10/10  States that he was using a lot of Aleve to go to pain clinic daily to get his methadone  Noncompliant with PWB at times and now refusing PT  Denies any chest pain  No fevers or chills  No nausea or vomiting  Discussed with nursing staff, no acute issues overnight, orders reviewed. Assessment & Plan:     Chronic left heel infected ulcer (POA) s/p left foot wound and left heel debridement and Grafix application 83/0 By Dr. Sheila Lambert  -?traumatic, appear mildly infected with surrounding cellulitic changes at medial side of left foot and dorsal side anteriorly foot  - not improve with clindamycin and ciprofloxacin PO.  -IV vancomycin  - added Ceftriaxone 10/2  for GNR coverage given the wound culture report showing GNR, final pending  -x ray right foot  soft tissue swelling along the dorsum of the foot. .  -wound care consulted  -Podiatrist is on board- PWB, with surgical shoe, foot elevation    Non-compliance  -Pt is non-compliant with the PWB, refuses to participate in PT, stating that he cannot use his left left in PWB fashion due to pain although he was going back and forth from the bed to bathroom with out the surgical shoe.  -Care and instruction reinforced  -FARIDEH nursing staff   -Patient advised to ring bell to have help going to bathroom and back     HTN  -continue lisinopril and monitor BP    Sinus bradycardia  -asymptomatic  -HR 56-62     Chronic pain  -on  methadone  -PRN Percocet for acute pain     Gout  -stable, Allopurinol      Bladder cancer  -s/p surgery in April 2018  -stable     Hypothyroidism  -synthroid   -euthyroid, TSH WNL    Hx of hemorrhagic stroke with left sided hemiparesis   -patient uses wheelchair and walker. But unable to do so now due to non weight bearing on the left as per podiatry    -continue supportive care    Chronic opiate dependence  -On Methadone      Code status: Full Code  DVT prophylaxis: SCD    Care Plan discussed with: Patient/Family and Nurse   FARIDEH POLANCO, working on the placement  Disposition: [facility]     Hospital Problems  Date Reviewed: 10/1/2018          Codes Class Noted POA    * (Principal)Foot ulcer ([facility] [address] ICD-10-CM: L97.509  ICD-9-CM: 707.15  9/28/2018 Unknown        Stroke ([facility] [address] ICD-10-CM: I63.9  ICD-9-CM: 434.91  3/11/2011 Yes        Hypertension ICD-10-CM: I10  ICD-9-CM: 401.9  3/11/2011 Yes              Vital Signs:    Last 24hrs VS reviewed since prior progress note.  Most recent are:  Visit Vitals    BP (!) 153/95    Pulse (!) 54    Temp 97.6 °F (36.4 °C)    Resp 18    Ht 6' 1\" (1.854 m)    Wt 103.4 kg (228 lb)    SpO2 100%    BMI 30.08 kg/m2         Intake/Output Summary (Last 24 hours) at 10/03/18 1346  Last data filed at 10/03/18 1153   Gross per 24 hour   Intake              240 ml   Output             2555 ml   Net            -2315 ml        Physical Examination:             Constitutional:  No acute distress, cooperative, pleasant    ENT:  Oral mucous moist, oropharynx benign. Neck supple,    Resp:  CTA bilaterally. No wheezing/rhonchi/rales. No accessory muscle use   CV:  Regular rhythm, normal rate, no murmurs, gallops, rubs    GI:  Soft, non distended, non tender. normoactive bowel sounds, no hepatosplenomegaly     Musculoskeletal:  No edema, left foot ulcer wound with some discharge and surrounding erythema, currently dressed    Neurologic:  Conscious and well oriented, right extremities 5/5, LUE 1/5, LLE 4/5     Psych:  Good insight, Not anxious nor agitated. Data Review:    Review and/or order of clinical lab test      Labs:     Recent Labs      10/03/18   0412   WBC  13.9*   HGB  12.1   HCT  37.4   PLT  233     Recent Labs      10/03/18   0412  10/02/18   0333  10/01/18   0050   NA  141  137  140   K  4.3  4.2  4.0   CL  106  103  105   CO2  27  25  27   BUN  10  10  9   CREA  0.71  0.73  0.95   GLU  107*  195*  129*   CA  8.2*  8.6  8.0*   MG  2.0   --   1.8     No results for input(s): SGOT, GPT, ALT, AP, TBIL, TBILI, TP, ALB, GLOB, GGT, AML, LPSE in the last 72 hours. No lab exists for component: AMYP, HLPSE  No results for input(s): INR, PTP, APTT in the last 72 hours. No lab exists for component: INREXT, INREXT   No results for input(s): FE, TIBC, PSAT, FERR in the last 72 hours. Lab Results   Component Value Date/Time    Folate 8.6 12/13/2012 03:20 AM      No results for input(s): PH, PCO2, PO2 in the last 72 hours. No results for input(s): CPK, CKNDX, TROIQ in the last 72 hours.     No lab exists for component: CPKMB  Lab Results   Component Value Date/Time    Cholesterol, total 152 12/13/2012 03:30 AM    HDL Cholesterol 26 12/13/2012 03:30 AM    LDL, calculated 92.2 12/13/2012 03:30 AM    Triglyceride 169 (H) 12/13/2012 03:30 AM    CHOL/HDL Ratio 5.8 (H) 12/13/2012 03:30 AM     Lab Results   Component Value Date/Time    Glucose (POC) 150 (H) 03/19/2018 11:45 AM    Glucose (POC) 123 (H) 03/18/2018 08:55 PM Glucose (POC) 96 03/16/2018 04:58 PM    Glucose (POC) 116 (H) 12/12/2012 12:42 PM    Glucose (POC) 99 12/11/2012 11:27 PM     Lab Results   Component Value Date/Time    Color YELLOW/STRAW 07/31/2018 05:52 PM    Appearance CLEAR 07/31/2018 05:52 PM    Specific gravity 1.006 07/31/2018 05:52 PM    pH (UA) 6.0 07/31/2018 05:52 PM    Protein NEGATIVE  07/31/2018 05:52 PM    Glucose NEGATIVE  07/31/2018 05:52 PM    Ketone NEGATIVE  07/31/2018 05:52 PM    Bilirubin NEGATIVE  07/31/2018 05:52 PM    Urobilinogen 0.2 07/31/2018 05:52 PM    Nitrites NEGATIVE  07/31/2018 05:52 PM    Leukocyte Esterase NEGATIVE  07/31/2018 05:52 PM    Epithelial cells FEW 07/31/2018 05:52 PM    Bacteria NEGATIVE  07/31/2018 05:52 PM    WBC 0-4 07/31/2018 05:52 PM    RBC 0-5 07/31/2018 05:52 PM     All Micro Results     Procedure Component Value Units Date/Time    CULTURE, Jai Skiff STAIN [[id number]]  (Abnormal) Collected:  09/29/18 0041    Order Status:  Completed Specimen:  Wound from Foot Updated:  10/02/18 1303     Special Requests: NO SPECIAL REQUESTS        GRAM STAIN NO ORGANISMS SEEN        Culture result: HEAVY DIPHTHEROIDS (A)                 LIGHT GRAM NEGATIVE RODS (A)    CULTURE, WOUND Virgle Leonor STAIN [[id number]]  (Abnormal) Collected:  10/01/18 1347    Order Status:  Completed Specimen:  Wound Updated:  10/02/18 1244     Special Requests: Paola Lantigua     GRAM STAIN RARE WBCS SEEN         NO ORGANISMS SEEN        Culture result: LIGHT DIPHTHEROIDS (A)                 CHECKING FOR POSSIBLE GRAM NEGATIVE RODS (A)    CULTURE, ANAEROBIC [[id number]] Collected:  10/01/18 1347    Order Status:  Completed Updated:  10/01/18 0597            Medications Reviewed:     Current Facility-Administered Medications   Medication Dose Route Frequency    cefTRIAXone (ROCEPHIN) 1 g in 0.9% sodium chloride (MBP/ADV) 50 mL  1 g IntraVENous Q24H    HYDROmorphone (DILAUDID) injection 1 mg  1 mg IntraVENous Q3H PRN    zolpidem (AMBIEN) tablet 5 mg  5 mg Oral QHS PRN    vancomycin (VANCOCIN) 1750 mg in  ml infusion  1,750 mg IntraVENous Q12H    methadone (DOLOPHINE) 5 mg/5 mL oral solution 115 mg  115 mg Oral DAILY    levothyroxine (SYNTHROID) tablet 88 mcg  88 mcg Oral ACB    lisinopril (PRINIVIL, ZESTRIL) tablet 20 mg  20 mg Oral DAILY    tamsulosin (FLOMAX) capsule 0.4 mg  0.4 mg Oral DAILY    sodium chloride (NS) flush 5-10 mL  5-10 mL IntraVENous Q8H    sodium chloride (NS) flush 5-10 mL  5-10 mL IntraVENous PRN    acetaminophen (TYLENOL) tablet 650 mg  650 mg Oral Q4H PRN    ondansetron (ZOFRAN) injection 4 mg  4 mg IntraVENous Q4H PRN    oxyCODONE-acetaminophen (PERCOCET) 5-325 mg per tablet 1 Tab  1 Tab Oral Q4H PRN    Vancomycin Pharmacy to Dose   Other Rx Dosing/Monitoring     ______________________________________________________________________  EXPECTED LENGTH OF STAY: 4d 0h  ACTUAL LENGTH OF STAY:          5                 German Wilder MD

## 2018-10-03 NOTE — PROGRESS NOTES
[facility] cannot accept this patient into care. [address] is still reviewing his referral; called and left a message for [facility] in admissions. Note that the patient is exhibiting some non-compliance behaviors. Spoke with PAMELA Ray#[phone], the patient's , who plans to visit the patient tomorrow and encourage him to work with PT/OT. Care Management will continue to follow his disposition.    JUVENCIO Mittal

## 2018-10-03 NOTE — PROGRESS NOTES
Bedside shift change report given to Michael Hernandez RN (oncoming nurse) by EMERY Tilley (offgoing nurse). Report included the following information SBAR, Kardex and MAR.

## 2018-10-03 NOTE — PROGRESS NOTES
Bedside shift change report given to [facility] (oncoming nurse) by Aníbal Wooten and Nan Sandifer (offgoing nurse). Report included the following information SBAR, Kardex, Intake/Output, MAR and Recent Results.

## 2018-10-03 NOTE — PROGRESS NOTES
Problem: Self Care Deficits Care Plan (Adult)  Goal: *Acute Goals and Plan of Care (Insert Text)  Occupational Therapy Goals  Initiated 10/3/2018  1. Patient will perform lower body dressing with minimal assistance/contact guard assist within 7 day(s) using AE PRN. 2.  Patient will perform grooming with supervision/set-up seated within 7 day(s). 3.  Patient will perform bathing with minimal assistance/contact guard assist within 7 day(s). 4.  Patient will perform toilet transfers with supervision/set-up while adhering to [facility] precaution within 7 day(s). 5.  Patient will perform all aspects of toileting with minimal assistance within 7 day(s) using DME PRN. 6.  Patient will adhere to [facility] precaution using surgical shoe with supervision within 7 day(s). 7.  Patient will utilize energy conservation techniques during functional activities with verbal cues within 7 day(s). Occupational Therapy EVALUATION  Patient: Eduard Quezada (38 y.o. male)  Date: 10/3/2018  Primary Diagnosis: Foot ulcer ([address]  infected left foot  Procedure(s) (LRB):  LEFT FOOT WOUND AND LEFT HEEL  DEBRIDEMENT AND GRAFIX APPLICATION TO LEFT FOOT AND LEFT HEEL. (Left) 2 Days Post-Op   Precautions: surgical shoe  PWB, Fall    ASSESSMENT :  Based on the objective data described below, the patient presents with decreased safety awareness, impaired decision making, non-compliance with NWB precautions, pain in L foot and chronic nerve pain on L side of body, limited LUE function due to joint contractures s/p CVA, decreased strength, decreased balance, and decreased activity tolerance. PTA, patient utilized a cane for functional mobility and lived alone in an apartment, frequently going to a methadone clinic for chronic pain relief per patient report. Upon initial evaluation, patient requiring MAX A for LB dressing, bathing, and toileting due to non-compliance with PWB precautions and the above deficits.  RN reporting that patient has been up x 2 to use the bathroom without surgical shoe or adherence to precautions and patient refusing to use bedside commode at this time. Recommend rehab to maximize patient safety and independence with ADL tasks. Recommend practice with LB dressing and toileting next session while adhering to [facility] precaution. Recommend with nursing patient to complete as able in order to maintain strength, endurance and independence: ADLs with supervision/setup, OOB to chair 3x/day and mobilizing to the bathroom for toileting with 1 person assist. Thank you for your assistance. Patient will benefit from skilled intervention to address the above impairments. Patients rehabilitation potential is considered to be Good  Factors which may influence rehabilitation potential include:   [x]             None noted  []             Mental ability/status  []             Medical condition  []             Home/family situation and support systems  []             Safety awareness  []             Pain tolerance/management  []             Other:      PLAN :  Recommendations and Planned Interventions:  [x]               Self Care Training                  [x]        Therapeutic Activities  [x]               Functional Mobility Training    []        Cognitive Retraining  [x]               Therapeutic Exercises           [x]        Endurance Activities  [x]               Balance Training                   [x]        Neuromuscular Re-Education  []               Visual/Perceptual Training     [x]   Home Safety Training  [x]               Patient Education                 [x]        Family Training/Education  []               Other (comment):    Frequency/Duration: Patient will be followed by occupational therapy 3 times a week to address goals.   Discharge Recommendations: Skilled Nursing Facility  Further Equipment Recommendations for Discharge: TBD     SUBJECTIVE:   Patient stated I am not trying to be unfriendly this pain is just too bad to do anything physical today.     OBJECTIVE DATA SUMMARY:   HISTORY:   Past Medical History:   Diagnosis Date    Aneurysm ([facility] [address]     (with stroke)    Bladder tumor     Family history of bladder cancer     Gout     Hypercholesterolemia     Hypertension     Lesion of bladder     Seizures ([facility] [address]     Stroke ([facility] [address] 2006    left sided weakness    Thromboembolus ([facility] [address]     Thyroid disease     TIA (transient ischemic attack) 2012     Past Surgical History:   Procedure Laterality Date    HX UROLOGICAL  04/20/2018    Cystoscopy, TURBT (greater than 5 cm resected) Dr. Josef Gustafson, [facility].  KNEE ARTHROSCP HARV      left knee    TRANSURETHRAL RESEC BLADDER NECK  08/10/2018       Prior Level of Function/Environment/Context: Patient reports that he lives alone in a 2nd level apartment with about 18-19 HANS. Patient reports that he utilized a cane for functional mobility and was largely MOD I for self-care tasks. Patient reports that he frequently goes to a methadone clinic for relief of his chronic L side nerve pain. Patient reports that he cannot return to apartment post discharge. Expanded or extensive additional review of patient history:     Home Situation  Home Environment: Apartment  One/Two Story Residence: Two story  # of Interior Steps: 19  Living Alone: Yes  Support Systems: None  Patient Expects to be Discharged to[de-identified] Skilled nursing facility  Current DME Used/Available at Home: Cane, straight  Tub or Shower Type: Tub/Shower combination    Hand dominance: Right    EXAMINATION OF PERFORMANCE DEFICITS:  Cognitive/Behavioral Status:  Neurologic State: Alert  Orientation Level: Oriented X4  Cognition: Poor safety awareness; Impaired decision making  Perception: Appears intact  Perseveration: Perseverates during conversation  Safety/Judgement: Decreased awareness of need for safety    Skin: exposed areas grossly intact; bandage on L foot intact    Hearing:   Auditory  Auditory Impairment: None    Range of Motion:  AROM: Grossly decreased, non-functional (LUE only; RUE WFL)  PROM: Grossly decreased, non-functional (L shoulder limited to 90 degrees; digits and elbow contracted)                      Strength:  Strength: Generally decreased, functional (RUE; LUE non-functional)                Coordination:  Coordination: Generally decreased, functional  Fine Motor Skills-Upper: Left Impaired;Right Intact    Gross Motor Skills-Upper: Left Impaired;Right Intact    Tone & Sensation:  Tone: Abnormal (increased tone in LUE)  Sensation: Impaired (L foot numbness; chronic nerve pain on entire L side of body per patient report)    Balance:  Sitting: Intact  Standing: Impaired  Standing - Static: Fair  Standing - Dynamic : Fair    Functional Mobility and Transfers for ADLs:  Bed Mobility:  Supine to Sit: Modified independent (using bed rails for support)  Scooting: Modified independent (increased time due to L foot pain)    Transfers:  Sit to Stand: Contact guard assistance  Stand to Sit: Contact guard assistance  Bed to Chair: Contact guard assistance  Toilet Transfer : Contact guard assistance (simulated to bedside commode however patient non-compliant)    ADL Assessment:  Feeding: Setup (infer from impaired L hand FM coordination)    Oral Facial Hygiene/Grooming: Setup (infer from PWB status and FM coordination)    Bathing: Maximum assistance (infer from dynamic standing balance and PWB precautions)    Upper Body Dressing: Minimum assistance (infer from decreased ROM in LUE)    Lower Body Dressing: Maximum assistance (infer from dynamic standing balance and PWB status)    Toileting: Maximum assistance (infer from dynamic standing balance for clothing management )     ADL Intervention and task modifications:     OT educated patient on importance of functional mobility and on importance of maintaining PWB precaution secondary to nursing report that patient has been up to bathroom x 2 without surgical shoe and not adhering to precaution.  Patient denying use of gait belt and surgical shoe due to L side pain. Patient was CGA for bed > chair transfer pivoting on R foot. Patient demonstrating ability to perform tailor sit with LLE and RLE however requiring total assist for surgical shoe due to pain and patient immediately removing. RN and MD notified of patient non-compliance. Lower Body Dressing Assistance  Socks: Moderate assistance (simulated EOB)  Shoes with Velcro: Total assistance (dependent) (due to LLE pain)    Cognitive Retraining  Problem Solving: Identifying the problem; Identifying the task  Safety/Judgement: Decreased awareness of need for safety     Pain:  Pain Scale 1: Numeric (0 - 10)  Pain Intensity 1: 10  Pain Location 1: Foot  Pain Orientation 1: Left  Pain Description 1: Aching  Pain Intervention(s) 1: Emotional support (Pain meds just given )  Activity Tolerance:   VSS  Please refer to the flowsheet for vital signs taken during this treatment. After treatment:   [x] Patient left in no apparent distress sitting up in chair  [] Patient left in no apparent distress in bed  [x] Call bell left within reach  [x] Nursing notified  [] Caregiver present  [] Bed alarm activated    COMMUNICATION/EDUCATION:   The patients plan of care was discussed with: Physical Therapist and Registered Nurse.  [] Home safety education was provided and the patient/caregiver indicated understanding. [] Patient/family have participated as able in goal setting and plan of care. [x] Patient/family agree to work toward stated goals and plan of care. [] Patient understands intent and goals of therapy, but is neutral about his/her participation. [] Patient is unable to participate in goal setting and plan of care. This patients plan of care is appropriate for delegation to [facility].     Thank you for this referral.  Landen Mata  Time Calculation: 24 mins

## 2018-10-03 NOTE — PROGRESS NOTES
Physical Therapy Note:    Order received. Patient chart reviewed. Met with patient at bedside. Adamant that he unable to participate and that the doctor made it clear to him that he is unable to weight bear on L. PT educated patient that Dr soler are [facility] with surgical shoe. Patient continues to refuse mobility assessment. Will follow up later as able/appropriate. Aaliyah Khan, PT, DPT  Total time spent: 15 minutes

## 2018-10-04 LAB
ANION GAP SERPL CALC-SCNC: 9 MMOL/L (ref 5–15)
BASOPHILS # BLD: 0.1 K/UL (ref 0–0.1)
BASOPHILS NFR BLD: 1 % (ref 0–1)
BUN SERPL-MCNC: 12 MG/DL (ref 6–20)
BUN/CREAT SERPL: 18 (ref 12–20)
CALCIUM SERPL-MCNC: 8.2 MG/DL (ref 8.5–10.1)
CHLORIDE SERPL-SCNC: 104 MMOL/L (ref 97–108)
CO2 SERPL-SCNC: 27 MMOL/L (ref 21–32)
CREAT SERPL-MCNC: 0.67 MG/DL (ref 0.7–1.3)
DIFFERENTIAL METHOD BLD: ABNORMAL
EOSINOPHIL # BLD: 0.8 K/UL (ref 0–0.4)
EOSINOPHIL NFR BLD: 6 % (ref 0–7)
ERYTHROCYTE [DISTWIDTH] IN BLOOD BY AUTOMATED COUNT: 16.5 % (ref 11.5–14.5)
GLUCOSE SERPL-MCNC: 103 MG/DL (ref 65–100)
HCT VFR BLD AUTO: 38.9 % (ref 36.6–50.3)
HGB BLD-MCNC: 12.5 G/DL (ref 12.1–17)
IMM GRANULOCYTES # BLD: 0.2 K/UL (ref 0–0.04)
IMM GRANULOCYTES NFR BLD AUTO: 2 % (ref 0–0.5)
LYMPHOCYTES # BLD: 3.2 K/UL (ref 0.8–3.5)
LYMPHOCYTES NFR BLD: 26 % (ref 12–49)
MAGNESIUM SERPL-MCNC: 2 MG/DL (ref 1.6–2.4)
MCH RBC QN AUTO: 28.9 PG (ref 26–34)
MCHC RBC AUTO-ENTMCNC: 32.1 G/DL (ref 30–36.5)
MCV RBC AUTO: 90 FL (ref 80–99)
MONOCYTES # BLD: 1.1 K/UL (ref 0–1)
MONOCYTES NFR BLD: 9 % (ref 5–13)
NEUTS SEG # BLD: 6.8 K/UL (ref 1.8–8)
NEUTS SEG NFR BLD: 56 % (ref 32–75)
NRBC # BLD: 0 K/UL (ref 0–0.01)
NRBC BLD-RTO: 0 PER 100 WBC
PLATELET # BLD AUTO: 239 K/UL (ref 150–400)
PMV BLD AUTO: 10.5 FL (ref 8.9–12.9)
POTASSIUM SERPL-SCNC: 4.5 MMOL/L (ref 3.5–5.1)
RBC # BLD AUTO: 4.32 M/UL (ref 4.1–5.7)
SODIUM SERPL-SCNC: 140 MMOL/L (ref 136–145)
WBC # BLD AUTO: 12.1 K/UL (ref 4.1–11.1)

## 2018-10-04 PROCEDURE — 74011000258 HC RX REV CODE- 258: Performed by: INTERNAL MEDICINE

## 2018-10-04 PROCEDURE — 85025 COMPLETE CBC W/AUTO DIFF WBC: CPT | Performed by: INTERNAL MEDICINE

## 2018-10-04 PROCEDURE — 80048 BASIC METABOLIC PNL TOTAL CA: CPT | Performed by: INTERNAL MEDICINE

## 2018-10-04 PROCEDURE — 65270000029 HC RM PRIVATE

## 2018-10-04 PROCEDURE — 36415 COLL VENOUS BLD VENIPUNCTURE: CPT | Performed by: INTERNAL MEDICINE

## 2018-10-04 PROCEDURE — 83735 ASSAY OF MAGNESIUM: CPT | Performed by: INTERNAL MEDICINE

## 2018-10-04 PROCEDURE — 74011250636 HC RX REV CODE- 250/636: Performed by: HOSPITALIST

## 2018-10-04 PROCEDURE — 74011250636 HC RX REV CODE- 250/636: Performed by: PODIATRIST

## 2018-10-04 PROCEDURE — 74011250636 HC RX REV CODE- 250/636: Performed by: INTERNAL MEDICINE

## 2018-10-04 PROCEDURE — 74011250637 HC RX REV CODE- 250/637: Performed by: HOSPITALIST

## 2018-10-04 RX ADMIN — LEVOTHYROXINE SODIUM 88 MCG: 88 TABLET ORAL at 08:22

## 2018-10-04 RX ADMIN — TAMSULOSIN HYDROCHLORIDE 0.4 MG: 0.4 CAPSULE ORAL at 08:27

## 2018-10-04 RX ADMIN — METHADONE HYDROCHLORIDE 115 MG: 5 SOLUTION ORAL at 09:30

## 2018-10-04 RX ADMIN — HYDROMORPHONE HYDROCHLORIDE 1 MG: 2 INJECTION INTRAMUSCULAR; INTRAVENOUS; SUBCUTANEOUS at 02:22

## 2018-10-04 RX ADMIN — ACETAMINOPHEN 650 MG: 325 TABLET ORAL at 19:30

## 2018-10-04 RX ADMIN — HYDROMORPHONE HYDROCHLORIDE 1 MG: 2 INJECTION INTRAMUSCULAR; INTRAVENOUS; SUBCUTANEOUS at 12:00

## 2018-10-04 RX ADMIN — VANCOMYCIN HYDROCHLORIDE 1750 MG: 10 INJECTION, POWDER, LYOPHILIZED, FOR SOLUTION INTRAVENOUS at 04:18

## 2018-10-04 RX ADMIN — CEFTRIAXONE 1 G: 1 INJECTION, POWDER, FOR SOLUTION INTRAMUSCULAR; INTRAVENOUS at 18:08

## 2018-10-04 RX ADMIN — Medication 10 ML: at 21:08

## 2018-10-04 RX ADMIN — OXYCODONE AND ACETAMINOPHEN 1 TABLET: 5; 325 TABLET ORAL at 14:44

## 2018-10-04 RX ADMIN — OXYCODONE AND ACETAMINOPHEN 1 TABLET: 5; 325 TABLET ORAL at 18:09

## 2018-10-04 RX ADMIN — LISINOPRIL 20 MG: 20 TABLET ORAL at 08:27

## 2018-10-04 RX ADMIN — HYDROMORPHONE HYDROCHLORIDE 1 MG: 2 INJECTION INTRAMUSCULAR; INTRAVENOUS; SUBCUTANEOUS at 18:30

## 2018-10-04 RX ADMIN — HYDROMORPHONE HYDROCHLORIDE 1 MG: 2 INJECTION INTRAMUSCULAR; INTRAVENOUS; SUBCUTANEOUS at 21:13

## 2018-10-04 RX ADMIN — Medication 10 ML: at 06:36

## 2018-10-04 RX ADMIN — HYDROMORPHONE HYDROCHLORIDE 1 MG: 2 INJECTION INTRAMUSCULAR; INTRAVENOUS; SUBCUTANEOUS at 06:35

## 2018-10-04 RX ADMIN — VANCOMYCIN HYDROCHLORIDE 1750 MG: 10 INJECTION, POWDER, LYOPHILIZED, FOR SOLUTION INTRAVENOUS at 15:29

## 2018-10-04 RX ADMIN — OXYCODONE AND ACETAMINOPHEN 1 TABLET: 5; 325 TABLET ORAL at 00:43

## 2018-10-04 RX ADMIN — OXYCODONE AND ACETAMINOPHEN 1 TABLET: 5; 325 TABLET ORAL at 04:18

## 2018-10-04 RX ADMIN — OXYCODONE AND ACETAMINOPHEN 1 TABLET: 5; 325 TABLET ORAL at 08:22

## 2018-10-04 NOTE — PROGRESS NOTES
Referrals were declined by [facility] and [facility]. Patient was updated at the bedside and referred back to his list of skilled nursing facilities. Referrals were placed today with the patient's permission to [facility], [facility], and [facility]; awaiting responses via [facility]. Patient still need encouragement to work with PT/OT. Updated , Gianna Bah, H#[phone] who also visited him today to encourage him to work with PT/OT in order to be able to get insurance authorization for a rehabilitation stay. Care Management will continue to follow his disposition.    JUVENCIO Mittal

## 2018-10-04 NOTE — PROGRESS NOTES
Charted 10/4/18:    Spiritual Care Partner Volunteer visited patient in [address] on 10/3/18. Documented by:   Chaplain Brand MDiv, MACE  [phone] ([phone])

## 2018-10-04 NOTE — PROGRESS NOTES
Progress Note    Patient: Henry Mandel MRN: [id number]  SSN: [id number]    YOB: 1965  Age: 47 y.o. Sex: male      Admit Date: 9/28/2018  3 Day Post-Op     Procedure:   Procedure(s):  LEFT FOOT WOUND AND LEFT HEEL  DEBRIDEMENT AND GRAFIX APPLICATION TO LEFT FOOT AND LEFT HEEL. Subjective:     Patient seen resting quiet and comfortably and no apparent distress. Pain on scale of 1-10 is an 10. He relates that he has a lot of pain today. Status post: left heel and foot ulcer debridements with Grafix application    Objective:     Visit Vitals    /80 (BP 1 Location: Right arm, BP Patient Position: At rest)    Pulse (!) 54    Temp 98.2 °F (36.8 °C)    Resp 16    Ht 6' 1\" (1.854 m)    Wt 103.4 kg (228 lb)    SpO2 96%    BMI 30.08 kg/m2        Physical Exam:  normal DP and PT pulses and reduced sensation at left foot with intact 3 layer wrap without strikethrough    Labs/Radiology Review: images and reports reviewed; Cx from OR and admission show GNRs    Assessment:     Hospital Problems  Date Reviewed: 10/1/2018          Codes Class Noted POA    * (Principal)[facility] ICD-10-CM: L97.509  ICD-9-CM: 707.15  9/28/2018 Unknown        Stroke [facility]) ICD-10-CM: I63.9  ICD-9-CM: 434.91  3/11/2011 Yes        Hypertension ICD-10-CM: I10  ICD-9-CM: 401.9  3/11/2011 Yes              Plan/Recommendations/Medical Decision Making:     Continue present treatment    Activity: elevate foot    Discontinue: n/a    Diet: regular    Education: none   Continue elevation of foot; PWB with surgical shoe  PT DC'd for now  No need to change dressing. Graft in place for one week from application. Will change outer layer tomorrow  Continue IV ABX. Cultures pending. Consult ID for ABX  Smoking cessation with [employer]. May need ABX and wound care at [facility] after DC.   Will follow    Signed By: Crystal Atkinson DPM     October 4, 2018

## 2018-10-04 NOTE — PROGRESS NOTES
Bedside shift change report given to Zohreh Babcock (oncoming nurse) by Michael Hernandez RN (offgoing nurse). Report included the following information SBAR, Intake/Output and MAR.

## 2018-10-04 NOTE — PHYSICIAN ADVISORY
Peer to peer review setup for Dr. Oliver Castellanos approved   Dr. Dyan Grijalvahead   10/05/18 10:55 AM   Dru Sánchez MD MPH FACP     Physician Advisor  [id number] [facility],[address] Documentation Management Program  [address] [place] [address]  [phone]      [phone]

## 2018-10-04 NOTE — PROGRESS NOTES
Hospitalist Progress Note  Sandy Will MD  Answering service: [phone] from in house phone  Cell: [phone]      Date of Service:  10/4/2018  NAME:  Luci Traore  :  1965  MRN:  [id number]      Admission Summary:   A 47 y.o man with a history of CVA, HTN, chronic pain, chronic left foot ulcer who presents with worsening of his ulcers. He reports an ulcer on the dorsum of his left foot that is reportedly the result of trauma that has been present for about a year, and a few weeks ago developed another ulcer on the medial aspect of the foot. He reports following in a wound care clinic and recently being prescribed ciprofloxacin and clindamycin which did not help. For the past week, the ulcers have been increasingly more painful and he noted yellow drainage from them. He denies fevers. Interval history / Subjective:     Patient seen and examined by the bedside  Labs, images and notes reviewed  Patient is complains of severe foot pain  10/10, in the left foot  Denies any chest pain  No fevers or chills  No nausea or vomiting  Discussed with nursing staff, no acute issues overnight, orders reviewed. Assessment & Plan:     Chronic left heel infected ulcer (POA) s/p left foot wound and left heel debridement and Grafix application  By Dr. Karyn Grant  -?traumatic, appear mildly infected with surrounding cellulitic changes at medial side of left foot and dorsal side anteriorly foot  - not improve with clindamycin and ciprofloxacin PO.  -IV vancomycin  - added Ceftriaxone 10/2  for GNR coverage given the wound culture report showing GNR, final pending  -x ray right foot  soft tissue swelling along the dorsum of the foot. .  -wound care consulted  -Podiatrist is on board- PWB, with surgical shoe, foot elevation  -ID consulted 10/4  -dressing changes as per podiatry    Non-compliance  -Pt is non-compliant with the PWB, refuses to participate in PT, stating that he cannot use his left left in [facility] fashion due to pain although he was going back and forth from the bed to bathroom with out the surgical shoe.  -Care and instruction reinforced  -[facility] nursing staff   -Patient advised to ring bell to have help going to bathroom and back     HTN  -continue lisinopril and monitor BP    Sinus bradycardia  -asymptomatic  -HR 56-62     Chronic pain  -on  methadone  -PRN Percocet for acute pain     Gout  -stable, Allopurinol      Bladder cancer  -s/p surgery in April 2018  -stable     Hypothyroidism  -synthroid   -euthyroid, TSH WNL    Hx of hemorrhagic stroke with left sided hemiparesis   -patient uses wheelchair and walker. But unable to do so now due to non weight bearing on the left as per podiatry    -continue supportive care    Chronic opiate dependence  -On Methadone      Code status: Full Code  DVT prophylaxis: SCD    Care Plan discussed with: Patient/Family and Nurse   FARDIEH POLANCO, working on the placement  Disposition: D     Hospital Problems  Date Reviewed: 10/1/2018          Codes Class Noted POA    * (Principal)Foot ulcer ([facility] [address] ICD-10-CM: L97.509  ICD-9-CM: 707.15  9/28/2018 Unknown        Stroke ([facility] [address] ICD-10-CM: I63.9  ICD-9-CM: 434.91  3/11/2011 Yes        Hypertension ICD-10-CM: I10  ICD-9-CM: 401.9  3/11/2011 Yes              Vital Signs:    Last 24hrs VS reviewed since prior progress note. Most recent are:  Visit Vitals    /82    Pulse (!) 53    Temp 98.1 °F (36.7 °C)    Resp 16    Ht 6' 1\" (1.854 m)    Wt 103.4 kg (228 lb)    SpO2 96%    BMI 30.08 kg/m2         Intake/Output Summary (Last 24 hours) at 10/04/18 1401  Last data filed at 10/04/18 1206   Gross per 24 hour   Intake                0 ml   Output             3350 ml   Net            -3350 ml        Physical Examination:             Constitutional:  No acute distress, cooperative, pleasant    ENT:  Oral mucous moist, oropharynx benign.  Neck supple,    Resp: CTA bilaterally. No wheezing/rhonchi/rales. No accessory muscle use   CV:  Regular rhythm, normal rate, no murmurs, gallops, rubs    GI:  Soft, non distended, non tender. normoactive bowel sounds, no hepatosplenomegaly     Musculoskeletal:  No edema, left foot currently dressed    Neurologic:  Conscious and well oriented, right extremities 5/5, LUE 1/5, LLE 4/5     Psych:  Good insight, Not anxious nor agitated. Data Review:    Review and/or order of clinical lab test      Labs:     Recent Labs      10/04/18   0423  10/03/18   0412   WBC  12.1*  13.9*   HGB  12.5  12.1   HCT  38.9  37.4   PLT  239  233     Recent Labs      10/04/18   0423  10/03/18   0412  10/02/18   0333   NA  140  141  137   K  4.5  4.3  4.2   CL  104  106  103   CO2  27  27  25   BUN  12  10  10   CREA  0.67*  0.71  0.73   GLU  103*  107*  195*   CA  8.2*  8.2*  8.6   MG  2.0  2.0   --      No results for input(s): SGOT, GPT, ALT, AP, TBIL, TBILI, TP, ALB, GLOB, GGT, AML, LPSE in the last 72 hours. No lab exists for component: AMYP, HLPSE  No results for input(s): INR, PTP, APTT in the last 72 hours. No lab exists for component: INREXT, INREXT   No results for input(s): FE, TIBC, PSAT, FERR in the last 72 hours. Lab Results   Component Value Date/Time    Folate 8.6 12/13/2012 03:20 AM      No results for input(s): PH, PCO2, PO2 in the last 72 hours. No results for input(s): CPK, CKNDX, TROIQ in the last 72 hours.     No lab exists for component: CPKMB  Lab Results   Component Value Date/Time    Cholesterol, total 152 12/13/2012 03:30 AM    HDL Cholesterol 26 12/13/2012 03:30 AM    LDL, calculated 92.2 12/13/2012 03:30 AM    Triglyceride 169 (H) 12/13/2012 03:30 AM    CHOL/HDL Ratio 5.8 (H) 12/13/2012 03:30 AM     Lab Results   Component Value Date/Time    Glucose (POC) 150 (H) 03/19/2018 11:45 AM    Glucose (POC) 123 (H) 03/18/2018 08:55 PM    Glucose (POC) 96 03/16/2018 04:58 PM    Glucose (POC) 116 (H) 12/12/2012 12:42 PM Glucose (POC) 99 12/11/2012 11:27 PM     Lab Results   Component Value Date/Time    Color YELLOW/STRAW 07/31/2018 05:52 PM    Appearance CLEAR 07/31/2018 05:52 PM    Specific gravity 1.006 07/31/2018 05:52 PM    pH (UA) 6.0 07/31/2018 05:52 PM    Protein NEGATIVE  07/31/2018 05:52 PM    Glucose NEGATIVE  07/31/2018 05:52 PM    Ketone NEGATIVE  07/31/2018 05:52 PM    Bilirubin NEGATIVE  07/31/2018 05:52 PM    Urobilinogen 0.2 07/31/2018 05:52 PM    Nitrites NEGATIVE  07/31/2018 05:52 PM    Leukocyte Esterase NEGATIVE  07/31/2018 05:52 PM    Epithelial cells FEW 07/31/2018 05:52 PM    Bacteria NEGATIVE  07/31/2018 05:52 PM    WBC 0-4 07/31/2018 05:52 PM    RBC 0-5 07/31/2018 05:52 PM     All Micro Results     Procedure Component Value Units Date/Time    CULTURE, Hal Horseman STAIN [[id number]]  (Abnormal) Collected:  10/01/18 1347    Order Status:  Completed Specimen:  Wound Updated:  10/03/18 1346     Special Requests: FOOT,LEFT     GRAM STAIN RARE WBCS SEEN         NO ORGANISMS SEEN        Culture result: LIGHT DIPHTHEROIDS (A)                 FEW GRAM NEGATIVE RODS (A)    CULTURE, Hal Horseman STAIN [[id number]]  (Abnormal) Collected:  09/29/18 0041    Order Status:  Completed Specimen:  Wound from Foot Updated:  10/02/18 1303     Special Requests: NO SPECIAL REQUESTS        GRAM STAIN NO ORGANISMS SEEN        Culture result: HEAVY DIPHTHEROIDS (A)                 LIGHT GRAM NEGATIVE RODS (A)    CULTURE, ANAEROBIC [[id number]] Collected:  10/01/18 1347    Order Status:  Completed Updated:  10/01/18 1514            Medications Reviewed:     Current Facility-Administered Medications   Medication Dose Route Frequency    cefTRIAXone (ROCEPHIN) 1 g in 0.9% sodium chloride (MBP/ADV) 50 mL  1 g IntraVENous Q24H    HYDROmorphone (DILAUDID) injection 1 mg  1 mg IntraVENous Q3H PRN    zolpidem (AMBIEN) tablet 5 mg  5 mg Oral QHS PRN    vancomycin (VANCOCIN) 1750 mg in  ml infusion  1,750 mg IntraVENous Q12H    methadone (DOLOPHINE) 5 mg/5 mL oral solution 115 mg  115 mg Oral DAILY    levothyroxine (SYNTHROID) tablet 88 mcg  88 mcg Oral ACB    lisinopril (PRINIVIL, ZESTRIL) tablet 20 mg  20 mg Oral DAILY    tamsulosin (FLOMAX) capsule 0.4 mg  0.4 mg Oral DAILY    sodium chloride (NS) flush 5-10 mL  5-10 mL IntraVENous Q8H    sodium chloride (NS) flush 5-10 mL  5-10 mL IntraVENous PRN    acetaminophen (TYLENOL) tablet 650 mg  650 mg Oral Q4H PRN    ondansetron (ZOFRAN) injection 4 mg  4 mg IntraVENous Q4H PRN    oxyCODONE-acetaminophen (PERCOCET) 5-325 mg per tablet 1 Tab  1 Tab Oral Q4H PRN    Vancomycin Pharmacy to Dose   Other Rx Dosing/Monitoring     ______________________________________________________________________  EXPECTED LENGTH OF STAY: 4d 2h  ACTUAL LENGTH OF STAY:          6                 Maci Villatoro MD

## 2018-10-04 NOTE — PROGRESS NOTES
Bedside shift change report given to Isael Ramsey (oncoming nurse) by Foster Muhammad, RN (offgoing nurse). Report included the following information SBAR, Intake/Output and MAR.

## 2018-10-04 NOTE — ADT AUTH CERT NOTES
Utilization Review           Cellulitis - Care Day 7 (10/4/2018) by Afia Rai RN        Review Status Review Entered       Completed 10/4/2018       Details              Care Day: 7 Care Date: 10/4/2018 Level of Care: Inpatient Floor       Guideline Day 3        Clinical Status       (X) * Hemodynamic stability       (X) * Fever absent or improved       (X) * Skin exam stable or improved       (X) * Mental status at baseline       ( ) * Antibiotic treatment needs appropriate for next level of care       ( ) * Pain absent or manageable at next level of care       ( ) * Discharge plans and education understood              Activity       (X) * Ambulatory              Routes       (X) * Oral hydration, medications,[E]and diet              Interventions       (X) WBC              Medications       (X) Parenteral or oral antibiotics[A]                                   * Milestone              Additional Notes       Patient seen resting quiet and comfortably and no apparent distress.  Pain on scale of 1-10 is an 10. He relates that he has a lot of pain today.       Status post: left heel and foot ulcer debridements with Grafix application       T 77.8 P 54 RR 16 /80 spO2 96%       Continue present treatment               Activity: elevate foot               Discontinue: n/a               Diet: regular               Education: none        Continue elevation of foot; PWB with surgical shoe       PT DC'd for now       No need to change dressing. Graft in place for one week from application. Will change outer layer tomorrow       Continue IV ABX. Cultures pending.  Consult ID for ABX       Smoking cessation with Chantix.       May need ABX and wound care at SNF after DC.       Dilaudid IV x2, Vancomycin IV q12h           Cellulitis - Care Day 6 (10/3/2018) by Afia Rai RN        Review Status Review Entered       Completed 10/3/2018       Details              Care Day: 6 Care Date: 10/3/2018 Level of Care: Inpatient Floor       Guideline Day 3        Clinical Status       (X) * Hemodynamic stability       (X) * Fever absent or improved       (X) * Skin exam stable or improved       (X) * Mental status at baseline       ( ) * Antibiotic treatment needs appropriate for next level of care       ( ) * Pain absent or manageable at next level of care       ( ) * Discharge plans and education understood              Activity       (X) * Ambulatory              Routes       (X) * Oral hydration, medications,[E]and diet              Interventions       (X) WBC              Medications       (X) Parenteral or oral antibiotics[A]                                   * Milestone              Additional Notes       Patient is comfortable,report left foot pain, 10/10       States that he was using a lot of Aleve to go to pain clinic daily to get his methadone       Noncompliant with PWB at times and now refusing PT       Chronic left heel infected ulcer (POA) s/p left foot wound and left heel debridement and Grafix application 29/7 By Dr. Argelia Winston       -?traumatic, appear mildly infected with surrounding cellulitic changes at medial side of left foot and dorsal side anteriorly foot       - not improve with clindamycin and ciprofloxacin PO.       -IV vancomycin       - added Ceftriaxone 10/2  for GNR coverage given the wound culture report showing GNR, final pending       -x ray right foot 9/28 soft tissue swelling along the dorsum of the foot. .       -wound care consulted       -Podiatrist is on board- PWB, with surgical shoe, foot elevation               Non-compliance       -Pt is non-compliant with the PWB, refuses to participate in PT, stating that he cannot use his left left in PWB fashion due to pain although he was going back and forth from the bed to bathroom with out the surgical shoe.       -Care and instruction reinforced       - nursing staff        -Patient advised to ring bell to have help going to bathroom and back                HTN       -continue lisinopril and monitor BP               Sinus bradycardia       -asymptomatic       -HR 56-62                Chronic pain       -on  methadone       -PRN Percocet for acute pain                Gout       -stable, Allopurinol                 Bladder cancer       -s/p surgery in April 2018       -stable                Hypothyroidism       -synthroid        -euthyroid, TSH WNL               Hx of hemorrhagic stroke with left sided hemiparesis        -patient uses wheelchair and walker.  But unable to do so now due to non weight bearing on the left as per podiatry         -continue supportive care               Chronic opiate dependence       -On Methadone                       Code status: Full Code       DVT prophylaxis: SCD       T 97.6 P 54 RR 18 /95 spO2 100%       WBC 13.9, Glucose 107, Calcium 8.2       Rocephin IV daily, Dilaudid IV x4, Vancomycin IV q12h           Cellulitis - Care Day 5 (10/2/2018) by John Gasca RN        Review Status Review Entered       Completed 10/2/2018       Details              Care Day: 5 Care Date: 10/2/2018 Level of Care: Inpatient Floor       Guideline Day 3        Clinical Status       (X) * Hemodynamic stability       (X) * Fever absent or improved       (X) * Skin exam stable or improved       (X) * Mental status at baseline       ( ) * Antibiotic treatment needs appropriate for next level of care       ( ) * Pain absent or manageable at next level of care       ( ) * Discharge plans and education understood              Activity       (X) * Ambulatory              Routes       (X) * Oral hydration, medications,[E]and diet              Interventions       (X) WBC              Medications       (X) Parenteral or oral antibiotics[A]                                   * Milestone              Additional Notes       1 day post op       Procedure:   Procedure(s):       LEFT FOOT WOUND AND LEFT HEEL  DEBRIDEMENT AND GRAFIX APPLICATION TO LEFT FOOT AND LEFT HEEL.       T 98 P 82 RR 16 /81 spO2 100%       Plan/Recommendations/Medical Decision Making:               Continue present treatment               Activity: elevate foot               Discontinue: n/a               Diet: regular               Education: none        Continue elevation of foot; PWB with surgical shoe       PT consulted       No need to change dressing. Graft in place for one week from application.       Continue IV ABX.  Cultures pending.       Smoking cessation with Chantix.       May need ABX and wound care at SNF after DC       Dilaudid IV x2, Vancomycin IV q12h           Cellulitis - Care Day 4 (10/1/2018) by Chevy Carias RN        Review Status Review Entered       Completed 10/1/2018       Details              Care Day: 4 Care Date: 10/1/2018 Level of Care: Inpatient Floor       Guideline Day 3        Clinical Status       (X) * Hemodynamic stability       (X) * Fever absent or improved       (X) * Skin exam stable or improved       (X) * Mental status at baseline       ( ) * Antibiotic treatment needs appropriate for next level of care       ( ) * Pain absent or manageable at next level of care       ( ) * Discharge plans and education understood              Activity       (X) * Ambulatory              Routes       (X) * Oral hydration, medications,[E]and diet              Interventions       (X) WBC              Medications       (X) Parenteral or oral antibiotics[A]                                   * Milestone              Additional Notes       Left foot pain the same 8-9/10, no left side chest pain or shortness of breath       L foot infected ulcer (POA)        -?traumatic, appear mildly infected with surrounding cellulitic changes at medial side of left foot and dorsal side anteriorly foot       - not improve with clindamycin and ciprofloxacin PO.       -follow up wound cx        -IV vancomycin       -x ray right foot 9/28 soft tissue swelling along the dorsum of the foot. .       -wound care consulted       -Podiatrist is on board and OR 10/1                HTN       -continue lisinopril and monitor BP               Sinus bradycardia       -asymptomatic       -HR 56-62                Chronic pain       -on  methadone       -PRN Percocet for acute pain                Gout       -stable, Allopurinol                 Bladder cancer       -s/p surgery in April 2018       -stable                Hypothyroidism       -synthroid        -check TSH in am               Hx of hemorrhagic stork with left sided hemiparesis        -patient uses wheelchair and walker.         -continue supportive care                       Code status: Full Code       DVT prophylaxis: SCD       T 98.6 P 56 RR 18 /70 spO2 95%       Glucose 129, Calcium 8.0       Vancomycin IV q12h

## 2018-10-04 NOTE — CONSULTS
Infectious Disease Consult    Today's Date: 10/4/2018   Admit Date: 9/28/2018    Impression:   · Chronic foot wound--no obvious osteomyelitis  · CVA with left sided sequelae   · Chronic pain issues    Plan:   · IV antibiotic therapy for now  · Follow up cultures and adjust meds when culture data available    Anti-infectives:   · Vancomycin   · Ceftriaxone     Subjective:   Date of Consultation:  October 4, 2018  Referring Physician: Dr Teri Rapp    Patient is a 47 y.o. male admitted for management of chronic foot wound. He has had a wound on the dorsum of the foot that has been there for about a year, after a traumatic event. He has had progressive pain, to the point that it was unmanageable at home. He is on IV antibiotic therapy and has had a graft placed on the wound. We are asked to see him in consultation. 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    FH: bladder cancer Z80.52    Left foot infection L08.9    Major depression P09.7    Uncomplicated opioid dependence ([facility]) F11.20    Chronic obstructive pulmonary disease ([facility]) J44.9    Chronic pain disorder G89.4    Hypokalemia E87.6    Seizure disorder ([facility]) G40.909    Tobacco abuse Z72.0    Foot ulcer ([facility]) L97.509     Past Medical History:   Diagnosis Date    Aneurysm [facility])     (with stroke)    Bladder tumor     Family history of bladder cancer     Gout     Hypercholesterolemia     Hypertension     Lesion of bladder     Seizures ([facility]     Stroke ([facility] 2006    left sided weakness    [facility]     Thyroid disease     TIA (transient ischemic attack) 2012      Family History   Problem Relation Age of Onset    Diabetes Father     Diabetes Sister     Diabetes Brother     Cancer Paternal Grandfather      Bladder      Social History   Substance Use Topics    Smoking status: Current Every Day Smoker     Packs/day: 1.00    Smokeless tobacco: Never Used    Alcohol use 8.4 oz/week     14 Cans of beer per week     Past Surgical History:   Procedure Laterality Date    HX UROLOGICAL  04/20/2018    Cystoscopy, TURBT (greater than 5 cm resected) Dr. Ashley Stanley, [facility].  KNEE ARTHROSCP HARV      left knee    TRANSURETHRAL RESEC BLADDER NECK  08/10/2018      Prior to Admission medications    Medication Sig Start Date End Date Taking? Authorizing Provider   ciprofloxacin HCl (CIPRO) 500 mg tablet Take 500 mg by mouth two (2) times a day. Start 9/22 x 10 days   Yes Historical Provider   clindamycin (CLEOCIN) 300 mg capsule Take 300 mg by mouth three (3) times daily. Start 9/22 x 10 days   Yes Historical Provider   traMADol (ULTRAM) 50 mg tablet Take 50 mg by mouth every six (6) hours as needed for Pain. Yes Historical Provider   terbinafine HCl (LAMISIL) 250 mg tablet Take 250 mg by mouth daily. Yes Historical Provider   varenicline (CHANTIX) 1 mg tablet Take 1 mg by mouth two (2) times daily (after meals). Yes Historical Provider   levothyroxine (SYNTHROID) 88 mcg tablet Take 88 mcg by mouth Daily (before breakfast). Yes Historical Provider   methadone (DOLOPHINE) 10 mg/mL solution Take 115 mg by mouth daily. Indications: Opioid Dependence   Yes Historical Provider   lidocaine-prilocaine (EMLA) topical cream Apply  to affected area daily.  Apply to foot daily with dressing change. Yes Historical Provider   lisinopril (PRINIVIL, ZESTRIL) 20 mg tablet Take 20 mg by mouth daily. Indications: hypertension   Yes Historical Provider   tamsulosin (FLOMAX) 0.4 mg capsule Take 0.4 mg by mouth daily. Indications: bladder cancer   Yes Historical Provider   colchicine (COLCRYS) 0.6 mg tablet Take 1 Tab by mouth daily as needed. For gout flare 18  Yes Josh Lowry MD   allopurinol (ZYLOPRIM) 100 mg tablet Take 1 Tab by mouth daily. 18  Yes Josh Lowry MD       Allergies   Allergen Reactions    Sulfamethoxazole-Trimethoprim Rash     L eye and L hand    Ciprofloxacin Hives     Per pcp records    Codeine Hives     Tolerates dilaudid, oxycodone    Gabapentin Hives    Lyrica [Pregabalin] Hives    Sulfa (Sulfonamide Antibiotics) Rash    Ultram [Tramadol] Hives        Review of Systems:  Constitutional: negative  Respiratory: negative  Cardiovascular: negative  Gastrointestinal: negative    Objective:     Visit Vitals    /74    Pulse 68    Temp 98 °F (36.7 °C)    Resp 18    Ht 6' 1\" (1.854 m)    Wt 103.4 kg (228 lb)    SpO2 98%    BMI 30.08 kg/m2     Temp (24hrs), Av.2 °F (36.8 °C), Min:98 °F (36.7 °C), Max:98.7 °F (37.1 °C)       Lines:  Peripheral IV:            Physical Exam:  Lungs:  clear to auscultation bilaterally  Heart:  regular rate and rhythm  Abdomen:  soft, non-tender.  Bowel sounds normal. No masses,  no organomegaly  Wound heavily dressed    Data Review:     CBC:  Recent Labs      10/04/18   0423  10/03/18   0412   WBC  12.1*  13.9*   GRANS  56  66   MONOS  9  7   EOS  6  2   ANEU  6.8  9.2*   ABL  3.2  3.2   HGB  12.5  12.1   HCT  38.9  37.4   PLT  239  233       BMP:  Recent Labs      10/04/18   0423  10/03/18   0412  10/02/18   0333   CREA  0.67*  0.71  0.73   BUN  12  10  10   NA  140  141  137   K  4.5  4.3  4.2   CL  104  106  103   CO2  27  27  25   AGAP  9  8  9   GLU  103*  107*  195*       LFTS:  No results for input(s): TBILI, ALT, SGOT, AP, TP, ALB in the last 72 hours.     Microbiology:     All Micro Results     Procedure Component Value Units Date/Time    CULTURE, Rogene Lade STAIN [[id number]]  (Abnormal) Collected:  09/29/18 0041    Order Status:  Completed Specimen:  Wound from Foot Updated:  10/04/18 1607     Special Requests: NO SPECIAL REQUESTS        GRAM STAIN NO ORGANISMS SEEN        Culture result: HEAVY DIPHTHEROIDS (A)                 LIGHT GRAM NEGATIVE RODS SENDING TO REFERENCE LABORATORY FOR IDENTIFICATION (A)    CULTURE, Rogene Lade STAIN [[id number]]  (Abnormal) Collected:  10/01/18 1347    Order Status:  Completed Specimen:  Wound Updated:  10/03/18 1346     Special Requests: FOOT,LEFT     GRAM STAIN RARE WBCS SEEN         NO ORGANISMS SEEN        Culture result: LIGHT DIPHTHEROIDS (A)                 FEW GRAM NEGATIVE RODS (A)    CULTURE, ANAEROBIC [[id number]] Collected:  10/01/18 1347    Order Status:  Completed Updated:  10/01/18 1514          Imaging:   Reviewed     Signed By: Adeel Campbell MD     October 4, 2018

## 2018-10-05 LAB
ANION GAP SERPL CALC-SCNC: 8 MMOL/L (ref 5–15)
BACTERIA SPEC CULT: ABNORMAL
BACTERIA SPEC CULT: NORMAL
BUN SERPL-MCNC: 12 MG/DL (ref 6–20)
BUN/CREAT SERPL: 17 (ref 12–20)
CALCIUM SERPL-MCNC: 8.9 MG/DL (ref 8.5–10.1)
CHLORIDE SERPL-SCNC: 106 MMOL/L (ref 97–108)
CO2 SERPL-SCNC: 22 MMOL/L (ref 21–32)
CREAT SERPL-MCNC: 0.7 MG/DL (ref 0.7–1.3)
GLUCOSE SERPL-MCNC: 121 MG/DL (ref 65–100)
GRAM STN SPEC: ABNORMAL
POTASSIUM SERPL-SCNC: 5.1 MMOL/L (ref 3.5–5.1)
SERVICE CMNT-IMP: ABNORMAL
SERVICE CMNT-IMP: ABNORMAL
SERVICE CMNT-IMP: NORMAL
SODIUM SERPL-SCNC: 136 MMOL/L (ref 136–145)

## 2018-10-05 PROCEDURE — 65270000029 HC RM PRIVATE

## 2018-10-05 PROCEDURE — 74011000258 HC RX REV CODE- 258: Performed by: INTERNAL MEDICINE

## 2018-10-05 PROCEDURE — 74011250636 HC RX REV CODE- 250/636: Performed by: INTERNAL MEDICINE

## 2018-10-05 PROCEDURE — 74011250637 HC RX REV CODE- 250/637: Performed by: HOSPITALIST

## 2018-10-05 PROCEDURE — 36415 COLL VENOUS BLD VENIPUNCTURE: CPT | Performed by: INTERNAL MEDICINE

## 2018-10-05 PROCEDURE — 74011250636 HC RX REV CODE- 250/636: Performed by: PODIATRIST

## 2018-10-05 PROCEDURE — 80048 BASIC METABOLIC PNL TOTAL CA: CPT | Performed by: INTERNAL MEDICINE

## 2018-10-05 PROCEDURE — 74011250636 HC RX REV CODE- 250/636: Performed by: HOSPITALIST

## 2018-10-05 RX ADMIN — OXYCODONE AND ACETAMINOPHEN 1 TABLET: 5; 325 TABLET ORAL at 04:00

## 2018-10-05 RX ADMIN — VANCOMYCIN HYDROCHLORIDE 1750 MG: 10 INJECTION, POWDER, LYOPHILIZED, FOR SOLUTION INTRAVENOUS at 06:33

## 2018-10-05 RX ADMIN — HYDROMORPHONE HYDROCHLORIDE 1 MG: 2 INJECTION INTRAMUSCULAR; INTRAVENOUS; SUBCUTANEOUS at 09:39

## 2018-10-05 RX ADMIN — HYDROMORPHONE HYDROCHLORIDE 1 MG: 2 INJECTION INTRAMUSCULAR; INTRAVENOUS; SUBCUTANEOUS at 15:22

## 2018-10-05 RX ADMIN — ACETAMINOPHEN 650 MG: 325 TABLET ORAL at 13:40

## 2018-10-05 RX ADMIN — CEFTRIAXONE 1 G: 1 INJECTION, POWDER, FOR SOLUTION INTRAMUSCULAR; INTRAVENOUS at 17:21

## 2018-10-05 RX ADMIN — LEVOTHYROXINE SODIUM 88 MCG: 88 TABLET ORAL at 06:34

## 2018-10-05 RX ADMIN — TAMSULOSIN HYDROCHLORIDE 0.4 MG: 0.4 CAPSULE ORAL at 08:21

## 2018-10-05 RX ADMIN — HYDROMORPHONE HYDROCHLORIDE 1 MG: 2 INJECTION INTRAMUSCULAR; INTRAVENOUS; SUBCUTANEOUS at 22:38

## 2018-10-05 RX ADMIN — METHADONE HYDROCHLORIDE 115 MG: 5 SOLUTION ORAL at 10:03

## 2018-10-05 RX ADMIN — LISINOPRIL 20 MG: 20 TABLET ORAL at 08:21

## 2018-10-05 RX ADMIN — HYDROMORPHONE HYDROCHLORIDE 1 MG: 2 INJECTION INTRAMUSCULAR; INTRAVENOUS; SUBCUTANEOUS at 06:44

## 2018-10-05 RX ADMIN — OXYCODONE AND ACETAMINOPHEN 1 TABLET: 5; 325 TABLET ORAL at 17:20

## 2018-10-05 RX ADMIN — OXYCODONE AND ACETAMINOPHEN 1 TABLET: 5; 325 TABLET ORAL at 13:14

## 2018-10-05 RX ADMIN — OXYCODONE AND ACETAMINOPHEN 1 TABLET: 5; 325 TABLET ORAL at 08:21

## 2018-10-05 RX ADMIN — Medication 10 ML: at 01:53

## 2018-10-05 RX ADMIN — Medication 10 ML: at 21:37

## 2018-10-05 RX ADMIN — OXYCODONE AND ACETAMINOPHEN 1 TABLET: 5; 325 TABLET ORAL at 21:36

## 2018-10-05 RX ADMIN — Medication 10 ML: at 06:34

## 2018-10-05 RX ADMIN — Medication 10 ML: at 13:22

## 2018-10-05 RX ADMIN — HYDROMORPHONE HYDROCHLORIDE 1 MG: 2 INJECTION INTRAMUSCULAR; INTRAVENOUS; SUBCUTANEOUS at 18:24

## 2018-10-05 RX ADMIN — HYDROMORPHONE HYDROCHLORIDE 1 MG: 2 INJECTION INTRAMUSCULAR; INTRAVENOUS; SUBCUTANEOUS at 01:53

## 2018-10-05 NOTE — PROGRESS NOTES
Reviewed medical chart; spoke with the patient's , Juleen Lesches, G#[phone]. Due to difficulty in finding an accepting facility, the search was expanded to all [facility]. Informed the insurance CM that the patient has been denied services at the following facilities:  · [place]  · [facility]  · [facility]   · [place]   · [facility]  · [facility]     Referrals are pending at the following facilities:  · [place]   · [facility]   · [address] [place] [address]   · [facility] (likely also denied, but the liaison Lauren plans to reach out to the patient to see if he would be willing to give his monthly income check to [facility])    Patient still needs encouragement to work with PT/OT. Care Management will continue to follow his disposition.    JUVENCIO Mercado

## 2018-10-05 NOTE — PROGRESS NOTES
ID Progress Note  10/5/2018    Subjective:     Still with a lot of pain    Objective:     Antibiotics:  1. Vancomycin   2. Rocephin      Vitals:   Visit Vitals    /81    Pulse 67    Temp 98.2 °F (36.8 °C)    Resp 16    Ht 6' 1\" (1.854 m)    Wt 103.4 kg (228 lb)    SpO2 94%    BMI 30.08 kg/m2        Tmax:  Temp (24hrs), Av °F (36.7 °C), Min:97.7 °F (36.5 °C), Max:98.2 °F (36.8 °C)      Exam:  Lungs:  clear to auscultation bilaterally  Heart:  regular rate and rhythm  Abdomen:  soft, non-tender. Bowel sounds normal. No masses,  no organomegaly  No proximal cellulitis of leg    Labs:      Recent Labs      10/05/18   0447  10/04/18   0423  10/03/18   0412   WBC   --   12.1*  13.9*   HGB   --   12.5  12.1   PLT   --   239  233   BUN  12  12  10   CREA  0.70  0.67*  0.71       Cultures:     Lab Results   Component Value Date/Time    Specimen Description: BLOOD 2010 10:55 PM    Specimen Description: BLOOD 2009 11:30 PM     Lab Results   Component Value Date/Time    Culture result: NO ANAEROBES ISOLATED 10/01/2018 01:47 PM    Culture result: MODERATE DIPHTHEROIDS (A) 10/01/2018 01:47 PM    Culture result: FEW ALCALIGENES FAECALIS (A) 10/01/2018 01:47 PM       Radiology:     Line/Insert Date:           Assessment:     1. STI/ulceration of the left foot  2. Polymicrobial la from culture    Objective:     1. Continue current therapy for now  2. Wound care  3.  Group will see over the weekend if asked    Sliver Vinson MD

## 2018-10-05 NOTE — PROGRESS NOTES
Progress Note    Patient: Marvel Barkley MRN: [id number]  SSN: [id number]    YOB: 1965  Age: 47 y.o. Sex: male      Admit Date: 9/28/2018  4 Day Post-Op     Procedure:   Procedure(s):  LEFT FOOT WOUND AND LEFT HEEL  DEBRIDEMENT AND GRAFIX APPLICATION TO LEFT FOOT AND LEFT HEEL. Subjective:     Patient seen resting quiet and comfortably and no apparent distress. Pain on scale of 1-10 is an 10. He relates that he has a lot of pain today. Status post: left heel and foot ulcer debridements with Grafix application    Objective:     Visit Vitals    BP (!) 150/100    Pulse (!) 54    Temp 97.7 °F (36.5 °C)    Resp 16    Ht 6' 1\" (1.854 m)    Wt 103.4 kg (228 lb)    SpO2 96%    BMI 30.08 kg/m2        Physical Exam:  normal DP and PT pulses and reduced sensation at left foot with intact 3 layer wrap without strikethrough;  Dressing removed. Wounds with grafts/ steristrips intact, no purulence    Labs/Radiology Review: images and reports reviewed; Cx from OR and admission show GNRs    Assessment:     Hospital Problems  Date Reviewed: 10/1/2018          Codes Class Noted POA    * (Principal)Foot ulcer ([facility] ICD-10-CM: L97.509  ICD-9-CM: 707.15  9/28/2018 Unknown        Stroke ([facility] ICD-10-CM: I63.9  ICD-9-CM: 434.91  3/11/2011 Yes        Hypertension ICD-10-CM: I10  ICD-9-CM: 401.9  3/11/2011 Yes              Plan/Recommendations/Medical Decision Making:     Continue present treatment    Activity: elevate foot    Discontinue: n/a    Diet: regular    Education: none   Continue elevation of foot; PWB with surgical shoe  PT restarted. PWB to left foot with surgical shoe  Graft in place for one week from application. Changed outer layer. Continue IV ABX. Cultures pending. Appreciate ID input  Smoking cessation with Chantix. May need ABX and wound care at [facility] after DC.   Will follow    Signed By: Dane Blevins DPM     October 5, 2018

## 2018-10-05 NOTE — PROGRESS NOTES
Patient would not allow this nurse to finish am labwork. States\" get that needle out of my arm, it hurts. \" Chemistry obtained but not CBC

## 2018-10-05 NOTE — PROGRESS NOTES
NUTRITION  Pt seen for:   [x] LOS          RECOMMENDATIONS:   None at this time    SUBJECTIVE/OBJECTIVE:   Pt admitted for foot ulcer. PMHx: stroke, HTN, bladder CA, depression. S/p left foot and heel debridement on 10/1. ID following for abx. Placement pending. With good appetite supplements not indicated at this time.  Will add double protein foods to tray to help with wound healing with HS snack    Diet:  regular  Intake: [x]           Good     []           Fair      []           Poor   Patient Vitals for the past 100 hrs:   % Diet Eaten   10/04/18 1808 100 %   10/04/18 0829 100 %   10/03/18 0831 100 %     Weight Changes:   [x]            Stable  Wt Readings from Last 10 Encounters:   09/28/18 103.4 kg (228 lb)   09/28/18 103.4 kg (228 lb)   09/05/18 101.2 kg (223 lb)   08/08/18 101.2 kg (223 lb)   07/31/18 104.3 kg (230 lb)   06/05/18 102.3 kg (225 lb 8.5 oz)   06/04/18 104.3 kg (230 lb)   03/13/18 100.4 kg (221 lb 5.5 oz)   03/14/18 100.2 kg (221 lb)   03/06/18 106.1 kg (234 lb)     Nutrition Problems Identified:  [x]      Increased protein needs r/t wound healing AEB left foot wound s/p debridement    PLAN:   []           Obtained/adjusted food preferences/tolerances and/or snacks options   [x]           Rescreen per screening protocol  [x]           Add high protein foods, high protein snack    Rescreen:  []            At Nutrition Risk           [x]            Not at Nutrition Risk, rescreen per screening protocol    Carlos Fonseca RD

## 2018-10-05 NOTE — PROGRESS NOTES
Hospitalist Progress Note  Chapincito Woods MD  Answering service: [phone] from in house phone  Cell: [phone]      Date of Service:  10/5/2018  NAME:  Divine Velázquez  :  1965  MRN:  [id number]      Admission Summary:   A 47 y.o man with a history of CVA, HTN, chronic pain, chronic left foot ulcer who presents with worsening of his ulcers. He reports an ulcer on the dorsum of his left foot that is reportedly the result of trauma that has been present for about a year, and a few weeks ago developed another ulcer on the medial aspect of the foot. He reports following in a wound care clinic and recently being prescribed ciprofloxacin and clindamycin which did not help. For the past week, the ulcers have been increasingly more painful and he noted yellow drainage from them. He denies fevers. Interval history / Subjective:     Patient seen and examined by the bedside  Labs, images and notes reviewed  Pt cont to complain of pain  Dressing changes done by the Podiatrist today  Afebrile  No nausea     Assessment & Plan:     Chronic left heel infected ulcer (POA) s/p left foot wound and left heel debridement and Grafix application  By Dr. Stout Nip  -?traumatic, appear mildly infected with surrounding cellulitic changes at medial side of left foot and dorsal side anteriorly foot  - not improve with clindamycin and ciprofloxacin PO.  -IV vancomycin  - added Ceftriaxone 10/2  for GNR coverage given the wound culture report showing GNR, final pending  -x ray right foot  soft tissue swelling along the dorsum of the foot. .  -wound care consulted  -Podiatrist is on board- PWB, with surgical shoe, foot elevation  -ID consulted 10/4  -dressing changes as per podiatry    10/5:  ID on board  Awaiting final cultures  Cont current ABX    Non-compliance  -Pt is non-compliant with the PWB, refuses to participate in PT, stating that he cannot use his left left in PWB fashion due to pain although he was going back and forth from the bed to bathroom with out the surgical shoe.  -Care and instruction reinforced  -FAR[facility] nursing staff   -Patient advised to ring bell to have help going to bathroom and back     HTN  -continue lisinopril and monitor BP    Sinus bradycardia  -asymptomatic  -HR 56-62     Chronic pain  -on  methadone  -PRN Percocet for acute pain     Gout  -stable, Allopurinol      Bladder cancer  -s/p surgery in April 2018  -stable     Hypothyroidism  -synthroid   -euthyroid, TSH WNL    Hx of hemorrhagic stroke with left sided hemiparesis   -patient uses wheelchair and walker. But unable to do so now due to non weight bearing on the left as per podiatry    -continue supportive care    Chronic opiate dependence  -On Methadone      Code status: Full Code  DVT prophylaxis: SCD    Care Plan discussed with: Patient/Family and Nurse   FARIDEH POLANCO, working on the placement  Disposition: D     Hospital Problems  Date Reviewed: 10/1/2018          Codes Class Noted POA    * (Principal)Foot ulcer ([facility] ICD-10-CM: L97.509  ICD-9-CM: 707.15  9/28/2018 Unknown        Stroke ([facility] ICD-10-CM: I63.9  ICD-9-CM: 434.91  3/11/2011 Yes        Hypertension ICD-10-CM: I10  ICD-9-CM: 401.9  3/11/2011 Yes              Vital Signs:    Last 24hrs VS reviewed since prior progress note. Most recent are:  Visit Vitals    /81    Pulse 67    Temp 98.2 °F (36.8 °C)    Resp 16    Ht 6' 1\" (1.854 m)    Wt 103.4 kg (228 lb)    SpO2 94%    BMI 30.08 kg/m2         Intake/Output Summary (Last 24 hours) at 10/05/18 1411  Last data filed at 10/05/18 1341   Gross per 24 hour   Intake              350 ml   Output             2050 ml   Net            -1700 ml        Physical Examination:             Constitutional:  No acute distress, cooperative, pleasant    ENT:  Oral mucous moist, oropharynx benign. Neck supple,    Resp:  CTA bilaterally. No wheezing/rhonchi/rales.  No accessory muscle use   CV:  Regular rhythm, normal rate, no murmurs, gallops, rubs    GI:  Soft, non distended, non tender. normoactive bowel sounds, no hepatosplenomegaly     Musculoskeletal:  No edema, left foot currently dressed    Neurologic:  Conscious and well oriented, right extremities 5/5, LUE 1/5, LLE 4/5     Psych:  Good insight, Not anxious nor agitated. Data Review:    Review and/or order of clinical lab test      Labs:     Recent Labs      10/04/18   0423  10/03/18   0412   WBC  12.1*  13.9*   HGB  12.5  12.1   HCT  38.9  37.4   PLT  239  233     Recent Labs      10/05/18   0447  10/04/18   0423  10/03/18   0412   NA  136  140  141   K  5.1  4.5  4.3   CL  106  104  106   CO2  22  27  27   BUN  12  12  10   CREA  0.70  0.67*  0.71   GLU  121*  103*  107*   CA  8.9  8.2*  8.2*   MG   --   2.0  2.0     No results for input(s): SGOT, GPT, ALT, AP, TBIL, TBILI, TP, ALB, GLOB, GGT, AML, LPSE in the last 72 hours. No lab exists for component: AMYP, HLPSE  No results for input(s): INR, PTP, APTT in the last 72 hours. No lab exists for component: INREXT, INREXT   No results for input(s): FE, TIBC, PSAT, FERR in the last 72 hours. Lab Results   Component Value Date/Time    Folate 8.6 12/13/2012 03:20 AM      No results for input(s): PH, PCO2, PO2 in the last 72 hours. No results for input(s): CPK, CKNDX, TROIQ in the last 72 hours.     No lab exists for component: CPKMB  Lab Results   Component Value Date/Time    Cholesterol, total 152 12/13/2012 03:30 AM    HDL Cholesterol 26 12/13/2012 03:30 AM    LDL, calculated 92.2 12/13/2012 03:30 AM    Triglyceride 169 (H) 12/13/2012 03:30 AM    CHOL/HDL Ratio 5.8 (H) 12/13/2012 03:30 AM     Lab Results   Component Value Date/Time    Glucose (POC) 150 (H) 03/19/2018 11:45 AM    Glucose (POC) 123 (H) 03/18/2018 08:55 PM    Glucose (POC) 96 03/16/2018 04:58 PM    Glucose (POC) 116 (H) 12/12/2012 12:42 PM    Glucose (POC) 99 12/11/2012 11:27 PM     Lab Results Component Value Date/Time    Color YELLOW/STRAW 07/31/2018 05:52 PM    Appearance CLEAR 07/31/2018 05:52 PM    Specific gravity 1.006 07/31/2018 05:52 PM    pH (UA) 6.0 07/31/2018 05:52 PM    Protein NEGATIVE  07/31/2018 05:52 PM    Glucose NEGATIVE  07/31/2018 05:52 PM    Ketone NEGATIVE  07/31/2018 05:52 PM    Bilirubin NEGATIVE  07/31/2018 05:52 PM    Urobilinogen 0.2 07/31/2018 05:52 PM    Nitrites NEGATIVE  07/31/2018 05:52 PM    Leukocyte Esterase NEGATIVE  07/31/2018 05:52 PM    Epithelial cells FEW 07/31/2018 05:52 PM    Bacteria NEGATIVE  07/31/2018 05:52 PM    WBC 0-4 07/31/2018 05:52 PM    RBC 0-5 07/31/2018 05:52 PM     All Micro Results     Procedure Component Value Units Date/Time    CULTURE, Flaco Constable STAIN [[id number]]  (Abnormal) Collected:  09/29/18 0041    Order Status:  Completed Specimen:  Wound from Foot Updated:  10/05/18 0950     Special Requests: NO SPECIAL REQUESTS        GRAM STAIN NO ORGANISMS SEEN        Culture result: HEAVY DIPHTHEROIDS (A)                 LIGHT GRAM NEGATIVE RODS SENDING TO REFERENCE LABORATORY FOR IDENTIFICATION (A)      PLEASE REFER TO [id number], FOR SUBSEQUENT RESULTS    CULTURE, Flaco Constable STAIN [[id number]]  (Abnormal)  (Susceptibility) Collected:  10/01/18 1347    Order Status:  Completed Specimen:  Wound Updated:  10/05/18 0949     Special Requests: Edyta Belch STAIN RARE WBCS SEEN         NO ORGANISMS SEEN        Culture result: MODERATE DIPHTHEROIDS (A)                 FEW ALCALIGENES FAECALIS (A)    SUSCEPTIBILITY, AER + ANAEROB [[id number]] Collected:  09/29/18 0041    Order Status:  Completed Updated:  10/05/18 0725    ORGANISM ID BY MALDI [[id number]] Collected:  09/29/18 0041    Order Status:  Completed Updated:  10/05/18 0725    CULTURE, ANAEROBIC [[id number]] Collected:  10/01/18 1347    Order Status:  Completed Updated:  10/01/18 1343            Medications Reviewed:     Current Facility-Administered Medications   Medication Dose Route Frequency    Vancomycin trough - 10/5/18 at 1900   Other ONCE    cefTRIAXone (ROCEPHIN) 1 g in 0.9% sodium chloride (MBP/ADV) 50 mL  1 g IntraVENous Q24H    HYDROmorphone (DILAUDID) injection 1 mg  1 mg IntraVENous Q3H PRN    zolpidem (AMBIEN) tablet 5 mg  5 mg Oral QHS PRN    vancomycin (VANCOCIN) 1750 mg in  ml infusion  1,750 mg IntraVENous Q12H    levothyroxine (SYNTHROID) tablet 88 mcg  88 mcg Oral ACB    lisinopril (PRINIVIL, ZESTRIL) tablet 20 mg  20 mg Oral DAILY    tamsulosin (FLOMAX) capsule 0.4 mg  0.4 mg Oral DAILY    sodium chloride (NS) flush 5-10 mL  5-10 mL IntraVENous Q8H    sodium chloride (NS) flush 5-10 mL  5-10 mL IntraVENous PRN    acetaminophen (TYLENOL) tablet 650 mg  650 mg Oral Q4H PRN    ondansetron (ZOFRAN) injection 4 mg  4 mg IntraVENous Q4H PRN    oxyCODONE-acetaminophen (PERCOCET) 5-325 mg per tablet 1 Tab  1 Tab Oral Q4H PRN    Vancomycin Pharmacy to Dose   Other Rx Dosing/Monitoring     ______________________________________________________________________  EXPECTED LENGTH OF STAY: 4d 2h  ACTUAL LENGTH OF STAY:          7                 German Wilder MD

## 2018-10-06 LAB
ANION GAP SERPL CALC-SCNC: 5 MMOL/L (ref 5–15)
BASOPHILS # BLD: 0.1 K/UL (ref 0–0.1)
BASOPHILS NFR BLD: 1 % (ref 0–1)
BUN SERPL-MCNC: 12 MG/DL (ref 6–20)
BUN/CREAT SERPL: 18 (ref 12–20)
CALCIUM SERPL-MCNC: 8.5 MG/DL (ref 8.5–10.1)
CHLORIDE SERPL-SCNC: 103 MMOL/L (ref 97–108)
CO2 SERPL-SCNC: 29 MMOL/L (ref 21–32)
CREAT SERPL-MCNC: 0.68 MG/DL (ref 0.7–1.3)
DATE LAST DOSE: NORMAL
DIFFERENTIAL METHOD BLD: ABNORMAL
EOSINOPHIL # BLD: 0.8 K/UL (ref 0–0.4)
EOSINOPHIL NFR BLD: 8 % (ref 0–7)
ERYTHROCYTE [DISTWIDTH] IN BLOOD BY AUTOMATED COUNT: 16.1 % (ref 11.5–14.5)
GLUCOSE SERPL-MCNC: 103 MG/DL (ref 65–100)
HCT VFR BLD AUTO: 38 % (ref 36.6–50.3)
HGB BLD-MCNC: 12.1 G/DL (ref 12.1–17)
IMM GRANULOCYTES # BLD: 0.1 K/UL (ref 0–0.04)
IMM GRANULOCYTES NFR BLD AUTO: 1 % (ref 0–0.5)
LYMPHOCYTES # BLD: 3 K/UL (ref 0.8–3.5)
LYMPHOCYTES NFR BLD: 31 % (ref 12–49)
MCH RBC QN AUTO: 28.7 PG (ref 26–34)
MCHC RBC AUTO-ENTMCNC: 31.8 G/DL (ref 30–36.5)
MCV RBC AUTO: 90.3 FL (ref 80–99)
MONOCYTES # BLD: 1.1 K/UL (ref 0–1)
MONOCYTES NFR BLD: 11 % (ref 5–13)
NEUTS SEG # BLD: 4.8 K/UL (ref 1.8–8)
NEUTS SEG NFR BLD: 48 % (ref 32–75)
NRBC # BLD: 0 K/UL (ref 0–0.01)
NRBC BLD-RTO: 0 PER 100 WBC
PLATELET # BLD AUTO: 223 K/UL (ref 150–400)
PMV BLD AUTO: 10.3 FL (ref 8.9–12.9)
POTASSIUM SERPL-SCNC: 4.5 MMOL/L (ref 3.5–5.1)
RBC # BLD AUTO: 4.21 M/UL (ref 4.1–5.7)
REPORTED DOSE,DOSE: NORMAL UNITS
REPORTED DOSE/TIME,TMG: NORMAL
SODIUM SERPL-SCNC: 137 MMOL/L (ref 136–145)
VANCOMYCIN TROUGH SERPL-MCNC: 8.4 UG/ML (ref 5–10)
WBC # BLD AUTO: 9.9 K/UL (ref 4.1–11.1)

## 2018-10-06 PROCEDURE — 65270000029 HC RM PRIVATE

## 2018-10-06 PROCEDURE — 74011250637 HC RX REV CODE- 250/637: Performed by: HOSPITALIST

## 2018-10-06 PROCEDURE — 80048 BASIC METABOLIC PNL TOTAL CA: CPT | Performed by: INTERNAL MEDICINE

## 2018-10-06 PROCEDURE — 74011250637 HC RX REV CODE- 250/637: Performed by: INTERNAL MEDICINE

## 2018-10-06 PROCEDURE — 80202 ASSAY OF VANCOMYCIN: CPT | Performed by: INTERNAL MEDICINE

## 2018-10-06 PROCEDURE — 74011250636 HC RX REV CODE- 250/636: Performed by: INTERNAL MEDICINE

## 2018-10-06 PROCEDURE — 85025 COMPLETE CBC W/AUTO DIFF WBC: CPT | Performed by: INTERNAL MEDICINE

## 2018-10-06 PROCEDURE — 74011250636 HC RX REV CODE- 250/636: Performed by: HOSPITALIST

## 2018-10-06 PROCEDURE — 74011250636 HC RX REV CODE- 250/636: Performed by: PODIATRIST

## 2018-10-06 PROCEDURE — 74011000258 HC RX REV CODE- 258: Performed by: INTERNAL MEDICINE

## 2018-10-06 PROCEDURE — 36415 COLL VENOUS BLD VENIPUNCTURE: CPT | Performed by: INTERNAL MEDICINE

## 2018-10-06 RX ORDER — COLCHICINE 0.6 MG/1
0.6 TABLET ORAL
Status: DISCONTINUED | OUTPATIENT
Start: 2018-10-06 | End: 2018-10-11

## 2018-10-06 RX ADMIN — ACETAMINOPHEN 650 MG: 325 TABLET ORAL at 19:45

## 2018-10-06 RX ADMIN — HYDROMORPHONE HYDROCHLORIDE 1 MG: 2 INJECTION INTRAMUSCULAR; INTRAVENOUS; SUBCUTANEOUS at 10:58

## 2018-10-06 RX ADMIN — COLCHICINE 0.6 MG: 0.6 TABLET, FILM COATED ORAL at 13:58

## 2018-10-06 RX ADMIN — ACETAMINOPHEN 650 MG: 325 TABLET ORAL at 07:09

## 2018-10-06 RX ADMIN — OXYCODONE AND ACETAMINOPHEN 1 TABLET: 5; 325 TABLET ORAL at 17:26

## 2018-10-06 RX ADMIN — OXYCODONE AND ACETAMINOPHEN 1 TABLET: 5; 325 TABLET ORAL at 09:37

## 2018-10-06 RX ADMIN — LISINOPRIL 20 MG: 20 TABLET ORAL at 08:11

## 2018-10-06 RX ADMIN — CEFTRIAXONE 1 G: 1 INJECTION, POWDER, FOR SOLUTION INTRAMUSCULAR; INTRAVENOUS at 23:10

## 2018-10-06 RX ADMIN — TAMSULOSIN HYDROCHLORIDE 0.4 MG: 0.4 CAPSULE ORAL at 08:11

## 2018-10-06 RX ADMIN — HYDROMORPHONE HYDROCHLORIDE 1 MG: 2 INJECTION INTRAMUSCULAR; INTRAVENOUS; SUBCUTANEOUS at 07:57

## 2018-10-06 RX ADMIN — HYDROMORPHONE HYDROCHLORIDE 1 MG: 2 INJECTION INTRAMUSCULAR; INTRAVENOUS; SUBCUTANEOUS at 22:06

## 2018-10-06 RX ADMIN — HYDROMORPHONE HYDROCHLORIDE 1 MG: 2 INJECTION INTRAMUSCULAR; INTRAVENOUS; SUBCUTANEOUS at 02:08

## 2018-10-06 RX ADMIN — VANCOMYCIN HYDROCHLORIDE 1750 MG: 10 INJECTION, POWDER, LYOPHILIZED, FOR SOLUTION INTRAVENOUS at 17:26

## 2018-10-06 RX ADMIN — OXYCODONE AND ACETAMINOPHEN 1 TABLET: 5; 325 TABLET ORAL at 23:10

## 2018-10-06 RX ADMIN — LEVOTHYROXINE SODIUM 88 MCG: 88 TABLET ORAL at 07:09

## 2018-10-06 RX ADMIN — ZOLPIDEM TARTRATE 5 MG: 5 TABLET ORAL at 23:10

## 2018-10-06 RX ADMIN — OXYCODONE AND ACETAMINOPHEN 1 TABLET: 5; 325 TABLET ORAL at 01:24

## 2018-10-06 RX ADMIN — OXYCODONE AND ACETAMINOPHEN 1 TABLET: 5; 325 TABLET ORAL at 13:58

## 2018-10-06 RX ADMIN — Medication 10 ML: at 07:09

## 2018-10-06 RX ADMIN — OXYCODONE AND ACETAMINOPHEN 1 TABLET: 5; 325 TABLET ORAL at 05:44

## 2018-10-06 RX ADMIN — HYDROMORPHONE HYDROCHLORIDE 1 MG: 2 INJECTION INTRAMUSCULAR; INTRAVENOUS; SUBCUTANEOUS at 05:09

## 2018-10-06 RX ADMIN — HYDROMORPHONE HYDROCHLORIDE 1 MG: 2 INJECTION INTRAMUSCULAR; INTRAVENOUS; SUBCUTANEOUS at 15:11

## 2018-10-06 RX ADMIN — HYDROMORPHONE HYDROCHLORIDE 1 MG: 2 INJECTION INTRAMUSCULAR; INTRAVENOUS; SUBCUTANEOUS at 18:20

## 2018-10-06 RX ADMIN — VANCOMYCIN HYDROCHLORIDE 1750 MG: 10 INJECTION, POWDER, LYOPHILIZED, FOR SOLUTION INTRAVENOUS at 05:09

## 2018-10-06 NOTE — PROGRESS NOTES
Hospitalist Progress Note  Faith Nicholson MD  Answering service: [phone] OR [phone] from in house phone  Cell: [phone]      Date of Service:  10/6/2018  NAME:  Jason Romero  :  1965  MRN:  [id number]      Admission Summary:   A 47 y.o man with a history of CVA, HTN, chronic pain, chronic left foot ulcer who presents with worsening of his ulcers. He reports an ulcer on the dorsum of his left foot that is reportedly the result of trauma that has been present for about a year, and a few weeks ago developed another ulcer on the medial aspect of the foot. He reports following in a wound care clinic and recently being prescribed ciprofloxacin and clindamycin which did not help. For the past week, the ulcers have been increasingly more painful and he noted yellow drainage from them. He denies fevers. Interval history / Subjective:     Patient seen and examined by the bedside  Labs, images and notes reviewed  Pt cont to complain of pain, non compliant with PTOT  Afebrile  No nausea  DW nursing staff     Assessment & Plan:     Chronic left heel infected ulcer (POA) s/p left foot wound and left heel debridement and Grafix application  By Dr. Gallego [facility]  -?traumatic, appear mildly infected with surrounding cellulitic changes at medial side of left foot and dorsal side anteriorly foot  - not improve with clindamycin and ciprofloxacin PO.  -IV vancomycin  - added Ceftriaxone 10/2  for GNR coverage given the wound culture report showing GNR, final pending  -x ray right foot  soft tissue swelling along the dorsum of the foot. .  -wound care consulted  -Podiatrist is on board- PWB, with surgical shoe, foot elevation  -ID consulted 10/4  -dressing changes as per podiatry    10/5:  ID on board  Awaiting final cultures  Cont current ABX    10/6: wound cx: ALCALIGENES FAECALIS  sens to ceftriaxone  Cont current abx  ID to follow  PWB compliance urged to patient  Still complains of pain despite being on dilaudid and oxycodone    Non-compliance  -Pt is non-compliant with the PWB, refuses to participate in PT, stating that he cannot use his left left in PWB fashion due to pain although he was going back and forth from the bed to bathroom with out the surgical shoe.  -Care and instruction reinforced  -FARIDEH nursing staff   -Patient advised to ring bell to have help going to bathroom and back     HTN  -continue lisinopril and monitor BP    Sinus bradycardia  -asymptomatic  -HR 56-62     Chronic pain  -on  methadone  -PRN Percocet for acute pain     Gout  -stable, Allopurinol      Bladder cancer  -s/p surgery in April 2018  -stable     Hypothyroidism  -synthroid   -euthyroid, TSH WNL    Hx of hemorrhagic stroke with left sided hemiparesis   -patient uses wheelchair and walker. But unable to do so now due to non weight bearing on the left as per podiatry    -continue supportive care    Chronic opiate dependence  -holding Methadone due to dilaudid and oxycodone use in acute pain setting      Code status: Full Code  DVT prophylaxis: SCD    Care Plan discussed with: Patient/Family and Nurse   FARIDEH POLANCO, working on the placement  Disposition: [facility]     Hospital Problems  Date Reviewed: 10/1/2018          Codes Class Noted POA    * (Principal)Foot ulcer ([facility] [address] ICD-10-CM: L97.509  ICD-9-CM: 707.15  9/28/2018 Unknown        Stroke ([facility] ICD-10-CM: I63.9  ICD-9-CM: 434.91  3/11/2011 Yes        Hypertension ICD-10-CM: I10  ICD-9-CM: 401.9  3/11/2011 Yes              Vital Signs:    Last 24hrs VS reviewed since prior progress note.  Most recent are:  Visit Vitals    /81    Pulse 63    Temp 98.8 °F (37.1 °C)    Resp 16    Ht 6' 1\" (1.854 m)    Wt 103.4 kg (228 lb)    SpO2 95%    BMI 30.08 kg/m2         Intake/Output Summary (Last 24 hours) at 10/06/18 1340  Last data filed at 10/06/18 1057   Gross per 24 hour   Intake              240 ml   Output             1750 ml Net            -1510 ml        Physical Examination:             Constitutional:  No acute distress, cooperative, pleasant    ENT:  Oral mucous moist, oropharynx benign. Neck supple,    Resp:  CTA bilaterally. No wheezing/rhonchi/rales. No accessory muscle use   CV:  Regular rhythm, normal rate, no murmurs, gallops, rubs    GI:  Soft, non distended, non tender. normoactive bowel sounds, no hepatosplenomegaly     Musculoskeletal:  No edema, left foot currently dressed    Neurologic:  Conscious and well oriented, right extremities 5/5, LUE 1/5, LLE 4/5     Psych:  Good insight, Not anxious nor agitated. Data Review:    Review and/or order of clinical lab test      Labs:     Recent Labs      10/06/18   0508  10/04/18   0423   WBC  9.9  12.1*   HGB  12.1  12.5   HCT  38.0  38.9   PLT  223  239     Recent Labs      10/06/18   0508  10/05/18   0447  10/04/18   0423   NA  137  136  140   K  4.5  5.1  4.5   CL  103  106  104   CO2  29  22  27   BUN  12  12  12   CREA  0.68*  0.70  0.67*   GLU  103*  121*  103*   CA  8.5  8.9  8.2*   MG   --    --   2.0     No results for input(s): SGOT, GPT, ALT, AP, TBIL, TBILI, TP, ALB, GLOB, GGT, AML, LPSE in the last 72 hours. No lab exists for component: AMYP, HLPSE  No results for input(s): INR, PTP, APTT in the last 72 hours. No lab exists for component: INREXT, INREXT   No results for input(s): FE, TIBC, PSAT, FERR in the last 72 hours. Lab Results   Component Value Date/Time    Folate 8.6 12/13/2012 03:20 AM      No results for input(s): PH, PCO2, PO2 in the last 72 hours. No results for input(s): CPK, CKNDX, TROIQ in the last 72 hours.     No lab exists for component: CPKMB  Lab Results   Component Value Date/Time    Cholesterol, total 152 12/13/2012 03:30 AM    HDL Cholesterol 26 12/13/2012 03:30 AM    LDL, calculated 92.2 12/13/2012 03:30 AM    Triglyceride 169 (H) 12/13/2012 03:30 AM    CHOL/HDL Ratio 5.8 (H) 12/13/2012 03:30 AM     Lab Results   Component Value Date/Time    Glucose (POC) 150 (H) 03/19/2018 11:45 AM    Glucose (POC) 123 (H) 03/18/2018 08:55 PM    Glucose (POC) 96 03/16/2018 04:58 PM    Glucose (POC) 116 (H) 12/12/2012 12:42 PM    Glucose (POC) 99 12/11/2012 11:27 PM     Lab Results   Component Value Date/Time    Color YELLOW/STRAW 07/31/2018 05:52 PM    Appearance CLEAR 07/31/2018 05:52 PM    Specific gravity 1.006 07/31/2018 05:52 PM    pH (UA) 6.0 07/31/2018 05:52 PM    Protein NEGATIVE  07/31/2018 05:52 PM    Glucose NEGATIVE  07/31/2018 05:52 PM    Ketone NEGATIVE  07/31/2018 05:52 PM    Bilirubin NEGATIVE  07/31/2018 05:52 PM    Urobilinogen 0.2 07/31/2018 05:52 PM    Nitrites NEGATIVE  07/31/2018 05:52 PM    Leukocyte Esterase NEGATIVE  07/31/2018 05:52 PM    Epithelial cells FEW 07/31/2018 05:52 PM    Bacteria NEGATIVE  07/31/2018 05:52 PM    WBC 0-4 07/31/2018 05:52 PM    RBC 0-5 07/31/2018 05:52 PM     All Micro Results     Procedure Component Value Units Date/Time    CULTURE, ANAEROBIC [[id number]] Collected:  10/01/18 1347    Order Status:  Completed Specimen:  Wound Updated:  10/05/18 1501     Special Requests: FOOT,LEFT     Culture result: NO ANAEROBES ISOLATED       CULTURE, WOUND Jannet Bookery STAIN [[id number]]  (Abnormal) Collected:  09/29/18 0041    Order Status:  Completed Specimen:  Wound from Foot Updated:  10/05/18 0950     Special Requests: NO SPECIAL REQUESTS        GRAM STAIN NO ORGANISMS SEEN        Culture result: HEAVY DIPHTHEROIDS (A)                 LIGHT GRAM NEGATIVE RODS SENDING TO REFERENCE LABORATORY FOR IDENTIFICATION (A)      PLEASE REFER TO [id number], FOR SUBSEQUENT RESULTS    CULTURE, Welby [place]otte STAIN [[id number]]  (Abnormal)  (Susceptibility) Collected:  10/01/18 1347    Order Status:  Completed Specimen:  Wound Updated:  10/05/18 6187     Special Requests: Fariha Vides STAIN RARE WBCS SEEN         NO ORGANISMS SEEN        Culture result: MODERATE DIPHTHEROIDS (A)                 FEW ALCALIGENES FAECALIS (A) SUSCEPTIBILITY, AER + ANAEROB [[id number]] Collected:  09/29/18 0041    Order Status:  Completed Updated:  10/05/18 0725    ORGANISM ID BY MALDI [[id number]] Collected:  09/29/18 0041    Order Status:  Completed Updated:  10/05/18 0725            Medications Reviewed:     Current Facility-Administered Medications   Medication Dose Route Frequency    colchicine tablet 0.6 mg  0.6 mg Oral DAILY PRN    cefTRIAXone (ROCEPHIN) 1 g in 0.9% sodium chloride (MBP/ADV) 50 mL  1 g IntraVENous Q24H    HYDROmorphone (DILAUDID) injection 1 mg  1 mg IntraVENous Q3H PRN    zolpidem (AMBIEN) tablet 5 mg  5 mg Oral QHS PRN    vancomycin (VANCOCIN) 1750 mg in  ml infusion  1,750 mg IntraVENous Q12H    levothyroxine (SYNTHROID) tablet 88 mcg  88 mcg Oral ACB    lisinopril (PRINIVIL, ZESTRIL) tablet 20 mg  20 mg Oral DAILY    tamsulosin (FLOMAX) capsule 0.4 mg  0.4 mg Oral DAILY    sodium chloride (NS) flush 5-10 mL  5-10 mL IntraVENous Q8H    sodium chloride (NS) flush 5-10 mL  5-10 mL IntraVENous PRN    acetaminophen (TYLENOL) tablet 650 mg  650 mg Oral Q4H PRN    ondansetron (ZOFRAN) injection 4 mg  4 mg IntraVENous Q4H PRN    oxyCODONE-acetaminophen (PERCOCET) 5-325 mg per tablet 1 Tab  1 Tab Oral Q4H PRN    Vancomycin Pharmacy to Dose   Other Rx Dosing/Monitoring     ______________________________________________________________________  EXPECTED LENGTH OF STAY: 4d 2h  ACTUAL LENGTH OF STAY:          8                 Cyril Hickman MD

## 2018-10-06 NOTE — PROGRESS NOTES
Day #8 of Vancomycin  Indication: Left foot ulcer s/p left heel/foot ulcer debridements with Grafix application    Current regimen:  1750 mg q12h  Current antibiotics: vancomycin + ceftriaxone     Recent Labs      10/06/18   0508  10/05/18   0447  10/04/18   0423   WBC  9.9   --   12.1*   CREA  0.68*  0.70  0.67*   BUN  12  12  12     Est CrCl: >100 ml/min; UO: unmeasurable  Temp (24hrs), Av.5 °F (36.9 °C), Min:98.2 °F (36.8 °C), Max:98.8 °F (37.1 °C)    Cultures:    foot wound: heavy diphtheroids, light GNR (final)  10/1 left foot wound: moderate diphtheroids, few Alcaligenes faecalis, susceptible to ceftriaxone (final)  10/1 wound (anaerobic): pending    Goal trough = 10 - 15 mcg/mL    Recent trough history (date/time/level/dose/action taken):  10/1 @ 0050 = 14.3 mcg/mL (~10.5 hr post-dose) - \"true\" trough ~12 mcg/mL  10/6 @ 0508 = 8.4 mcg/ml - not at steady state, drawn 7 hrs after the end of a 16 hr infusion - pump running at 10 ml/hr    Plan: Repeat level at steady state.

## 2018-10-06 NOTE — PROGRESS NOTES
PT Note:    New orders received and acknowledged. Patient initially agreeable to participate when rehab tech checked in with patient. Less than 5 minutes later, this author entered room and introduced self. Patient sitting EOB independently eating lunch. After introduction, patient immediately stated \"I don't know why ya'll keep coming in here trying to get me up. The doctor said I'm not to put any weight on my foot and I don't want to mess up what he just fixed. \"  Patient re-educated multiple times during conversation on order for [facility] per surgeon. Patient continued to adamantly refuse participation becoming increasing agitated. Reports pivoting to [facility] on one foot although noted BSC ~10 feet from patient's bed. Deferred evaluation and will follow up next week. Request that MD educate patient on PWB status and that participation with therapy is permitted as do not anticipate being able to evaluate patient until MD discusses directly with patient. Thank you.

## 2018-10-06 NOTE — PROGRESS NOTES
Bedside and Verbal shift change report given to Vaishnavi Macias RN (oncoming nurse) by Antonietta Nicole RN (offgoing nurse). Report included the following information SBAR, Kardex, ED Summary, Intake/Output, MAR and Recent Results.

## 2018-10-06 NOTE — PROGRESS NOTES
Problem: Falls - Risk of  Goal: *Absence of Falls  Document Jordon Fall Risk and appropriate interventions in the flowsheet. Outcome: Progressing Towards Goal  Fall Risk Interventions:  Mobility Interventions: Communicate number of staff needed for ambulation/transfer         Medication Interventions: Patient to call before getting OOB    Elimination Interventions: Patient to call for help with toileting needs             Problem: Patient Education: Go to Patient Education Activity  Goal: Patient/Family Education  Outcome: Progressing Towards Goal  Received patient from previous RN via bedside shift report. Patient is resting comfortably; A&Ox4. AVSS, NAD noted at this time. LLE drsg in place. Full assessment into epic. Patient updated on current POC- verbalized understanding. All safety precautions in place- safety maintained. Will continue to monitor.

## 2018-10-07 LAB
ANION GAP SERPL CALC-SCNC: 7 MMOL/L (ref 5–15)
BASOPHILS # BLD: 0.1 K/UL (ref 0–0.1)
BASOPHILS NFR BLD: 1 % (ref 0–1)
BUN SERPL-MCNC: 14 MG/DL (ref 6–20)
BUN/CREAT SERPL: 20 (ref 12–20)
CALCIUM SERPL-MCNC: 8.6 MG/DL (ref 8.5–10.1)
CHLORIDE SERPL-SCNC: 104 MMOL/L (ref 97–108)
CO2 SERPL-SCNC: 27 MMOL/L (ref 21–32)
CREAT SERPL-MCNC: 0.69 MG/DL (ref 0.7–1.3)
DIFFERENTIAL METHOD BLD: ABNORMAL
EOSINOPHIL # BLD: 0.8 K/UL (ref 0–0.4)
EOSINOPHIL NFR BLD: 9 % (ref 0–7)
ERYTHROCYTE [DISTWIDTH] IN BLOOD BY AUTOMATED COUNT: 15.9 % (ref 11.5–14.5)
GLUCOSE SERPL-MCNC: 116 MG/DL (ref 65–100)
HCT VFR BLD AUTO: 37.2 % (ref 36.6–50.3)
HGB BLD-MCNC: 11.8 G/DL (ref 12.1–17)
IMM GRANULOCYTES # BLD: 0.1 K/UL (ref 0–0.04)
IMM GRANULOCYTES NFR BLD AUTO: 1 % (ref 0–0.5)
LYMPHOCYTES # BLD: 2.8 K/UL (ref 0.8–3.5)
LYMPHOCYTES NFR BLD: 34 % (ref 12–49)
MCH RBC QN AUTO: 28.6 PG (ref 26–34)
MCHC RBC AUTO-ENTMCNC: 31.7 G/DL (ref 30–36.5)
MCV RBC AUTO: 90.3 FL (ref 80–99)
MONOCYTES # BLD: 0.9 K/UL (ref 0–1)
MONOCYTES NFR BLD: 11 % (ref 5–13)
NEUTS SEG # BLD: 3.6 K/UL (ref 1.8–8)
NEUTS SEG NFR BLD: 44 % (ref 32–75)
NRBC # BLD: 0 K/UL (ref 0–0.01)
NRBC BLD-RTO: 0 PER 100 WBC
PLATELET # BLD AUTO: 215 K/UL (ref 150–400)
PMV BLD AUTO: 10.7 FL (ref 8.9–12.9)
POTASSIUM SERPL-SCNC: 4.5 MMOL/L (ref 3.5–5.1)
RBC # BLD AUTO: 4.12 M/UL (ref 4.1–5.7)
SODIUM SERPL-SCNC: 138 MMOL/L (ref 136–145)
WBC # BLD AUTO: 8.2 K/UL (ref 4.1–11.1)

## 2018-10-07 PROCEDURE — 74011250636 HC RX REV CODE- 250/636: Performed by: PODIATRIST

## 2018-10-07 PROCEDURE — 65270000029 HC RM PRIVATE

## 2018-10-07 PROCEDURE — 36415 COLL VENOUS BLD VENIPUNCTURE: CPT | Performed by: INTERNAL MEDICINE

## 2018-10-07 PROCEDURE — 74011000258 HC RX REV CODE- 258: Performed by: INTERNAL MEDICINE

## 2018-10-07 PROCEDURE — 74011250636 HC RX REV CODE- 250/636: Performed by: HOSPITALIST

## 2018-10-07 PROCEDURE — 74011250637 HC RX REV CODE- 250/637: Performed by: HOSPITALIST

## 2018-10-07 PROCEDURE — 74011250636 HC RX REV CODE- 250/636: Performed by: INTERNAL MEDICINE

## 2018-10-07 PROCEDURE — 85025 COMPLETE CBC W/AUTO DIFF WBC: CPT | Performed by: INTERNAL MEDICINE

## 2018-10-07 PROCEDURE — 80048 BASIC METABOLIC PNL TOTAL CA: CPT | Performed by: INTERNAL MEDICINE

## 2018-10-07 PROCEDURE — 74011250637 HC RX REV CODE- 250/637: Performed by: INTERNAL MEDICINE

## 2018-10-07 RX ORDER — HYDROMORPHONE HYDROCHLORIDE 2 MG/ML
1.5 INJECTION, SOLUTION INTRAMUSCULAR; INTRAVENOUS; SUBCUTANEOUS
Status: DISCONTINUED | OUTPATIENT
Start: 2018-10-07 | End: 2018-10-07

## 2018-10-07 RX ORDER — OXYCODONE AND ACETAMINOPHEN 5; 325 MG/1; MG/1
1 TABLET ORAL
Status: DISCONTINUED | OUTPATIENT
Start: 2018-10-07 | End: 2018-10-08

## 2018-10-07 RX ORDER — METHADONE HYDROCHLORIDE 5 MG/5ML
115 SOLUTION ORAL DAILY
Status: DISCONTINUED | OUTPATIENT
Start: 2018-10-07 | End: 2018-10-12 | Stop reason: HOSPADM

## 2018-10-07 RX ADMIN — TAMSULOSIN HYDROCHLORIDE 0.4 MG: 0.4 CAPSULE ORAL at 08:08

## 2018-10-07 RX ADMIN — VANCOMYCIN HYDROCHLORIDE 1750 MG: 10 INJECTION, POWDER, LYOPHILIZED, FOR SOLUTION INTRAVENOUS at 04:44

## 2018-10-07 RX ADMIN — OXYCODONE HYDROCHLORIDE AND ACETAMINOPHEN 1 TABLET: 5; 325 TABLET ORAL at 18:58

## 2018-10-07 RX ADMIN — LEVOTHYROXINE SODIUM 88 MCG: 88 TABLET ORAL at 08:07

## 2018-10-07 RX ADMIN — HYDROMORPHONE HYDROCHLORIDE 1 MG: 2 INJECTION INTRAMUSCULAR; INTRAVENOUS; SUBCUTANEOUS at 04:44

## 2018-10-07 RX ADMIN — VANCOMYCIN HYDROCHLORIDE 1750 MG: 10 INJECTION, POWDER, LYOPHILIZED, FOR SOLUTION INTRAVENOUS at 19:19

## 2018-10-07 RX ADMIN — LISINOPRIL 20 MG: 20 TABLET ORAL at 09:00

## 2018-10-07 RX ADMIN — HYDROMORPHONE HYDROCHLORIDE 1 MG: 2 INJECTION INTRAMUSCULAR; INTRAVENOUS; SUBCUTANEOUS at 08:08

## 2018-10-07 RX ADMIN — ACETAMINOPHEN 650 MG: 325 TABLET ORAL at 22:43

## 2018-10-07 RX ADMIN — CEFTRIAXONE 1 G: 1 INJECTION, POWDER, FOR SOLUTION INTRAMUSCULAR; INTRAVENOUS at 18:45

## 2018-10-07 RX ADMIN — HYDROMORPHONE HYDROCHLORIDE 1 MG: 2 INJECTION INTRAMUSCULAR; INTRAVENOUS; SUBCUTANEOUS at 01:12

## 2018-10-07 RX ADMIN — METHADONE HYDROCHLORIDE 115 MG: 5 SOLUTION ORAL at 13:23

## 2018-10-07 RX ADMIN — OXYCODONE AND ACETAMINOPHEN 1 TABLET: 5; 325 TABLET ORAL at 03:41

## 2018-10-07 RX ADMIN — COLCHICINE 0.6 MG: 0.6 TABLET, FILM COATED ORAL at 08:12

## 2018-10-07 RX ADMIN — Medication 10 ML: at 17:11

## 2018-10-07 RX ADMIN — OXYCODONE AND ACETAMINOPHEN 1 TABLET: 5; 325 TABLET ORAL at 10:24

## 2018-10-07 NOTE — PROGRESS NOTES
Patient agitated and verbally aggressive towards staff once told that dilaudid was currently out of stock in the pyxis. Patient was informed that he may have to wait a very small amount of time until the pyxis was restocked. Patient again verbally aggressive. Patient stated, \"I want my doctor\", \"I want my methadone that I have been getting every day here\", \"I want to leave this place. \" Patient informed that he has not been receiving methadone here while on IV dilaudid. He then began yelling and told the RN \"get out of my room now. \" Patient demanding a new RN to come talk to him. RN supervisor made aware and will come speak with pt. Dr. Emily Branham made aware and is coming to speak with the patient regarding his plan of care.

## 2018-10-07 NOTE — PROGRESS NOTES
Bedside and Verbal shift change report given to Yesy Velazquez (oncoming nurse) by María Elena Acevedo (offgoing nurse). Report included the following information SBAR.

## 2018-10-07 NOTE — PROGRESS NOTES
Problem: Falls - Risk of  Goal: *Absence of Falls  Document Jordon Fall Risk and appropriate interventions in the flowsheet. Outcome: Progressing Towards Goal  Fall Risk Interventions:  Mobility Interventions: Communicate number of staff needed for ambulation/transfer, Patient to call before getting OOB, PT Consult for mobility concerns, Utilize walker, cane, or other assistive device         Medication Interventions: Patient to call before getting OOB, Teach patient to arise slowly, Evaluate medications/consider consulting pharmacy    Elimination Interventions: Patient to call for help with toileting needs             Problem: Patient Education: Go to Patient Education Activity  Goal: Patient/Family Education  Outcome: Progressing Towards Goal  Received patient from previous RN via bedside shift report. Patient is resting comfortably; A&Ox4. AVSS, NAD noted at this time. LLE drsg in place. Full assessment into epic. Patient updated on current POC- verbalized understanding. All safety precautions in place- safety maintained. Will continue to monitor.

## 2018-10-07 NOTE — PROGRESS NOTES
Hospitalist Progress Note  Chiquita Guzman MD  Answering service: [phone] from in house phone  Cell: [phone]      Date of Service:  10/7/2018  NAME:  Lui Rehman  :  1965  MRN:  [id number]      Admission Summary:   A 47 y.o man with a history of CVA, HTN, chronic pain, chronic left foot ulcer who presents with worsening of his ulcers. He reports an ulcer on the dorsum of his left foot that is reportedly the result of trauma that has been present for about a year, and a few weeks ago developed another ulcer on the medial aspect of the foot. He reports following in a wound care clinic and recently being prescribed ciprofloxacin and clindamycin which did not help. For the past week, the ulcers have been increasingly more painful and he noted yellow drainage from them. He denies fevers. Interval history / Subjective:     Patient seen and examined by the bedside  Labs, images and notes reviewed  Pt cont to complain of pain, non compliant with PTOT  Afebrile  No nausea  DW nursing staff     Assessment & Plan:     Chronic left heel infected ulcer (POA) s/p left foot wound and left heel debridement and Grafix application  By Dr. Vipin Lepe  -?traumatic, appear mildly infected with surrounding cellulitic changes at medial side of left foot and dorsal side anteriorly foot  - not improve with clindamycin and ciprofloxacin PO.  -IV vancomycin  - added Ceftriaxone 10/2  for GNR coverage given the wound culture report showing GNR, final pending  -x ray right foot  soft tissue swelling along the dorsum of the foot. .  -wound care consulted  -Podiatrist is on board- PWB, with surgical shoe, foot elevation  -ID consulted 10/4  -dressing changes as per podiatry    10/5:  ID on board  Awaiting final cultures  Cont current ABX    10/6: wound cx: ALCALIGENES FAECALIS  sens to ceftriaxone  Cont current abx  ID to follow  PWB compliance urged to patient  Still complains of pain despite being on dilaudid and oxycodone    10/7:  Increased dilaudid to 1.5 mg Iv Q3 prn given intense pain  Cont IV abx  ID to follow on final cultures and give recs    Addendum: patient is belligerent, because his methadone fell off, and he requests to be back on methadone so we will stop Dilaudid IV as there is risk of adverse effects from opiates. Non-compliance  -Pt is non-compliant with the PWB, refuses to participate in PT, stating that he cannot use his left left in PWB fashion due to pain although he was going back and forth from the bed to bathroom with out the surgical shoe.  -Care and instruction reinforced  -FARIDEH nursing staff   -Patient advised to ring bell to have help going to bathroom and back     HTN  -continue lisinopril and monitor BP    Sinus bradycardia  -asymptomatic  -HR 56-62     Chronic pain  -on  methadone     Gout  -stable, Allopurinol      Bladder cancer  -s/p surgery in April 2018  -stable     Hypothyroidism  -synthroid   -euthyroid, TSH WNL    Hx of hemorrhagic stroke with left sided hemiparesis   -patient uses wheelchair and walker. But unable to do so now due to non weight bearing on the left as per podiatry    -continue supportive care    Chronic opiate dependence  - Methadone resumed as per patient's request      Code status: Full Code  DVT prophylaxis: SCD    Care Plan discussed with: Patient/Family and Nurse   FARIDEH POLANCO, working on the placement  Disposition: D     Hospital Problems  Date Reviewed: 10/1/2018          Codes Class Noted POA    * (Principal)Foot ulcer ([facility] [address] ICD-10-CM: L97.509  ICD-9-CM: 707.15  9/28/2018 Unknown        Stroke ([facility] [address] ICD-10-CM: I63.9  ICD-9-CM: 434.91  3/11/2011 Yes        Hypertension ICD-10-CM: I10  ICD-9-CM: 401.9  3/11/2011 Yes              Vital Signs:    Last 24hrs VS reviewed since prior progress note.  Most recent are:  Visit Vitals    /68 (BP 1 Location: Left arm, BP Patient Position: At rest)  Pulse (!) 58    Temp 98.6 °F (37 °C)    Resp 17    Ht 6' 1\" (1.854 m)    Wt 103.4 kg (228 lb)    SpO2 95%    BMI 30.08 kg/m2         Intake/Output Summary (Last 24 hours) at 10/07/18 1158  Last data filed at 10/07/18 0810   Gross per 24 hour   Intake             1080 ml   Output             3550 ml   Net            -2470 ml        Physical Examination:             Constitutional:  No acute distress, cooperative, pleasant    ENT:  Oral mucous moist, oropharynx benign. Neck supple,    Resp:  CTA bilaterally. No wheezing/rhonchi/rales. No accessory muscle use   CV:  Regular rhythm, normal rate, no murmurs, gallops, rubs    GI:  Soft, non distended, non tender. normoactive bowel sounds, no hepatosplenomegaly     Musculoskeletal:  No edema, left foot currently dressed    Neurologic:  Conscious and well oriented, right extremities 5/5, LUE 1/5, LLE 4/5     Psych:  Good insight, Not anxious nor agitated. Data Review:    Review and/or order of clinical lab test      Labs:     Recent Labs      10/07/18   0518  10/06/18   0508   WBC  8.2  9.9   HGB  11.8*  12.1   HCT  37.2  38.0   PLT  215  223     Recent Labs      10/07/18   0518  10/06/18   0508  10/05/18   0447   NA  138  137  136   K  4.5  4.5  5.1   CL  104  103  106   CO2  27  29  22   BUN  14  12  12   CREA  0.69*  0.68*  0.70   GLU  116*  103*  121*   CA  8.6  8.5  8.9     No results for input(s): SGOT, GPT, ALT, AP, TBIL, TBILI, TP, ALB, GLOB, GGT, AML, LPSE in the last 72 hours. No lab exists for component: AMYP, HLPSE  No results for input(s): INR, PTP, APTT in the last 72 hours. No lab exists for component: INREXT, INREXT   No results for input(s): FE, TIBC, PSAT, FERR in the last 72 hours. Lab Results   Component Value Date/Time    Folate 8.6 12/13/2012 03:20 AM      No results for input(s): PH, PCO2, PO2 in the last 72 hours. No results for input(s): CPK, CKNDX, TROIQ in the last 72 hours.     No lab exists for component: CPKMB  Lab Results   Component Value Date/Time    Cholesterol, total 152 12/13/2012 03:30 AM    HDL Cholesterol 26 12/13/2012 03:30 AM    LDL, calculated 92.2 12/13/2012 03:30 AM    Triglyceride 169 (H) 12/13/2012 03:30 AM    CHOL/HDL Ratio 5.8 (H) 12/13/2012 03:30 AM     Lab Results   Component Value Date/Time    Glucose (POC) 150 (H) 03/19/2018 11:45 AM    Glucose (POC) 123 (H) 03/18/2018 08:55 PM    Glucose (POC) 96 03/16/2018 04:58 PM    Glucose (POC) 116 (H) 12/12/2012 12:42 PM    Glucose (POC) 99 12/11/2012 11:27 PM     Lab Results   Component Value Date/Time    Color YELLOW/STRAW 07/31/2018 05:52 PM    Appearance CLEAR 07/31/2018 05:52 PM    Specific gravity 1.006 07/31/2018 05:52 PM    pH (UA) 6.0 07/31/2018 05:52 PM    Protein NEGATIVE  07/31/2018 05:52 PM    Glucose NEGATIVE  07/31/2018 05:52 PM    Ketone NEGATIVE  07/31/2018 05:52 PM    Bilirubin NEGATIVE  07/31/2018 05:52 PM    Urobilinogen 0.2 07/31/2018 05:52 PM    Nitrites NEGATIVE  07/31/2018 05:52 PM    Leukocyte Esterase NEGATIVE  07/31/2018 05:52 PM    Epithelial cells FEW 07/31/2018 05:52 PM    Bacteria NEGATIVE  07/31/2018 05:52 PM    WBC 0-4 07/31/2018 05:52 PM    RBC 0-5 07/31/2018 05:52 PM     All Micro Results     Procedure Component Value Units Date/Time    CULTURE, ANAEROBIC [[id number]] Collected:  10/01/18 1347    Order Status:  Completed Specimen:  Wound Updated:  10/05/18 1501     Special Requests: FOOT,LEFT     Culture result: NO ANAEROBES ISOLATED       CULTURE, WOUND Iris Essence STAIN [[id number]]  (Abnormal) Collected:  09/29/18 0041    Order Status:  Completed Specimen:  Wound from Foot Updated:  10/05/18 0950     Special Requests: NO SPECIAL REQUESTS        GRAM STAIN NO ORGANISMS SEEN        Culture result: HEAVY DIPHTHEROIDS (A)                 LIGHT GRAM NEGATIVE RODS SENDING TO REFERENCE LABORATORY FOR IDENTIFICATION (A)      PLEASE REFER TO [id number], FOR SUBSEQUENT RESULTS    CULTURE, WOUND Iris Nuckolls STAIN [[id number]]  (Abnormal) (Susceptibility) Collected:  10/01/18 1347    Order Status:  Completed Specimen:  Wound Updated:  10/05/18 0949     Special Requests: Josse Mcdonough STAIN RARE WBCS SEEN         NO ORGANISMS SEEN        Culture result: MODERATE DIPHTHEROIDS (A)                 FEW ALCALIGENES FAECALIS (A)    SUSCEPTIBILITY, AER + ANAEROB [[id number]] Collected:  09/29/18 0041    Order Status:  Completed Updated:  10/05/18 0725    ORGANISM ID BY MALDI [[id number]] Collected:  09/29/18 0041    Order Status:  Completed Updated:  10/05/18 0725            Medications Reviewed:     Current Facility-Administered Medications   Medication Dose Route Frequency    [START ON 10/8/2018] vancomycin trough on 10/8 @ 04:30 - draw prior to dose due @ 05:00   Other ONCE    HYDROmorphone (DILAUDID) injection 1.5 mg  1.5 mg IntraVENous Q3H PRN    colchicine tablet 0.6 mg  0.6 mg Oral DAILY PRN    cefTRIAXone (ROCEPHIN) 1 g in 0.9% sodium chloride (MBP/ADV) 50 mL  1 g IntraVENous Q24H    zolpidem (AMBIEN) tablet 5 mg  5 mg Oral QHS PRN    vancomycin (VANCOCIN) 1750 mg in  ml infusion  1,750 mg IntraVENous Q12H    levothyroxine (SYNTHROID) tablet 88 mcg  88 mcg Oral ACB    lisinopril (PRINIVIL, ZESTRIL) tablet 20 mg  20 mg Oral DAILY    tamsulosin (FLOMAX) capsule 0.4 mg  0.4 mg Oral DAILY    sodium chloride (NS) flush 5-10 mL  5-10 mL IntraVENous Q8H    sodium chloride (NS) flush 5-10 mL  5-10 mL IntraVENous PRN    acetaminophen (TYLENOL) tablet 650 mg  650 mg Oral Q4H PRN    ondansetron (ZOFRAN) injection 4 mg  4 mg IntraVENous Q4H PRN    oxyCODONE-acetaminophen (PERCOCET) 5-325 mg per tablet 1 Tab  1 Tab Oral Q4H PRN    Vancomycin Pharmacy to Dose   Other Rx Dosing/Monitoring     ______________________________________________________________________  EXPECTED LENGTH OF STAY: 4d 2h  ACTUAL LENGTH OF STAY:          9                 Cyril Hickman MD

## 2018-10-08 LAB
ANION GAP SERPL CALC-SCNC: 9 MMOL/L (ref 5–15)
BASOPHILS # BLD: 0.1 K/UL (ref 0–0.1)
BASOPHILS NFR BLD: 1 % (ref 0–1)
BUN SERPL-MCNC: 15 MG/DL (ref 6–20)
BUN/CREAT SERPL: 20 (ref 12–20)
CALCIUM SERPL-MCNC: 8.8 MG/DL (ref 8.5–10.1)
CHLORIDE SERPL-SCNC: 102 MMOL/L (ref 97–108)
CO2 SERPL-SCNC: 28 MMOL/L (ref 21–32)
CREAT SERPL-MCNC: 0.74 MG/DL (ref 0.7–1.3)
DATE LAST DOSE: ABNORMAL
DIFFERENTIAL METHOD BLD: ABNORMAL
EOSINOPHIL # BLD: 0.9 K/UL (ref 0–0.4)
EOSINOPHIL NFR BLD: 10 % (ref 0–7)
ERYTHROCYTE [DISTWIDTH] IN BLOOD BY AUTOMATED COUNT: 15.9 % (ref 11.5–14.5)
GLUCOSE SERPL-MCNC: 119 MG/DL (ref 65–100)
HCT VFR BLD AUTO: 36.9 % (ref 36.6–50.3)
HGB BLD-MCNC: 11.9 G/DL (ref 12.1–17)
IMM GRANULOCYTES # BLD: 0.1 K/UL (ref 0–0.04)
IMM GRANULOCYTES NFR BLD AUTO: 1 % (ref 0–0.5)
LYMPHOCYTES # BLD: 2.6 K/UL (ref 0.8–3.5)
LYMPHOCYTES NFR BLD: 28 % (ref 12–49)
MCH RBC QN AUTO: 28.8 PG (ref 26–34)
MCHC RBC AUTO-ENTMCNC: 32.2 G/DL (ref 30–36.5)
MCV RBC AUTO: 89.3 FL (ref 80–99)
MONOCYTES # BLD: 1 K/UL (ref 0–1)
MONOCYTES NFR BLD: 11 % (ref 5–13)
NEUTS SEG # BLD: 4.7 K/UL (ref 1.8–8)
NEUTS SEG NFR BLD: 50 % (ref 32–75)
NRBC # BLD: 0 K/UL (ref 0–0.01)
NRBC BLD-RTO: 0 PER 100 WBC
PLATELET # BLD AUTO: 217 K/UL (ref 150–400)
PMV BLD AUTO: 10.2 FL (ref 8.9–12.9)
POTASSIUM SERPL-SCNC: 4.4 MMOL/L (ref 3.5–5.1)
RBC # BLD AUTO: 4.13 M/UL (ref 4.1–5.7)
REPORTED DOSE,DOSE: ABNORMAL UNITS
REPORTED DOSE/TIME,TMG: ABNORMAL
SODIUM SERPL-SCNC: 139 MMOL/L (ref 136–145)
VANCOMYCIN TROUGH SERPL-MCNC: 18.6 UG/ML (ref 5–10)
WBC # BLD AUTO: 9.4 K/UL (ref 4.1–11.1)

## 2018-10-08 PROCEDURE — 74011250637 HC RX REV CODE- 250/637: Performed by: HOSPITALIST

## 2018-10-08 PROCEDURE — 74011250636 HC RX REV CODE- 250/636: Performed by: INTERNAL MEDICINE

## 2018-10-08 PROCEDURE — 36415 COLL VENOUS BLD VENIPUNCTURE: CPT | Performed by: INTERNAL MEDICINE

## 2018-10-08 PROCEDURE — 80048 BASIC METABOLIC PNL TOTAL CA: CPT | Performed by: INTERNAL MEDICINE

## 2018-10-08 PROCEDURE — 85025 COMPLETE CBC W/AUTO DIFF WBC: CPT | Performed by: INTERNAL MEDICINE

## 2018-10-08 PROCEDURE — 65270000029 HC RM PRIVATE

## 2018-10-08 PROCEDURE — G8979 MOBILITY GOAL STATUS: HCPCS

## 2018-10-08 PROCEDURE — 74011250637 HC RX REV CODE- 250/637: Performed by: INTERNAL MEDICINE

## 2018-10-08 PROCEDURE — G8978 MOBILITY CURRENT STATUS: HCPCS

## 2018-10-08 PROCEDURE — 80202 ASSAY OF VANCOMYCIN: CPT | Performed by: INTERNAL MEDICINE

## 2018-10-08 PROCEDURE — 74011000258 HC RX REV CODE- 258: Performed by: INTERNAL MEDICINE

## 2018-10-08 PROCEDURE — 74011250636 HC RX REV CODE- 250/636: Performed by: HOSPITALIST

## 2018-10-08 PROCEDURE — 97116 GAIT TRAINING THERAPY: CPT

## 2018-10-08 PROCEDURE — 97161 PT EVAL LOW COMPLEX 20 MIN: CPT

## 2018-10-08 RX ORDER — IBUPROFEN 600 MG/1
600 TABLET ORAL
Status: DISPENSED | OUTPATIENT
Start: 2018-10-08 | End: 2018-10-11

## 2018-10-08 RX ORDER — OXYCODONE AND ACETAMINOPHEN 5; 325 MG/1; MG/1
1 TABLET ORAL
Status: DISCONTINUED | OUTPATIENT
Start: 2018-10-08 | End: 2018-10-12 | Stop reason: HOSPADM

## 2018-10-08 RX ADMIN — IBUPROFEN 600 MG: 600 TABLET ORAL at 22:46

## 2018-10-08 RX ADMIN — Medication 10 ML: at 17:01

## 2018-10-08 RX ADMIN — TAMSULOSIN HYDROCHLORIDE 0.4 MG: 0.4 CAPSULE ORAL at 08:58

## 2018-10-08 RX ADMIN — METHADONE HYDROCHLORIDE 115 MG: 5 SOLUTION ORAL at 09:36

## 2018-10-08 RX ADMIN — CEFTRIAXONE 1 G: 1 INJECTION, POWDER, FOR SOLUTION INTRAMUSCULAR; INTRAVENOUS at 17:01

## 2018-10-08 RX ADMIN — ACETAMINOPHEN 650 MG: 325 TABLET ORAL at 05:01

## 2018-10-08 RX ADMIN — OXYCODONE HYDROCHLORIDE AND ACETAMINOPHEN 1 TABLET: 5; 325 TABLET ORAL at 16:27

## 2018-10-08 RX ADMIN — Medication 10 ML: at 07:03

## 2018-10-08 RX ADMIN — VANCOMYCIN HYDROCHLORIDE 1750 MG: 10 INJECTION, POWDER, LYOPHILIZED, FOR SOLUTION INTRAVENOUS at 06:59

## 2018-10-08 RX ADMIN — OXYCODONE HYDROCHLORIDE AND ACETAMINOPHEN 1 TABLET: 5; 325 TABLET ORAL at 12:10

## 2018-10-08 RX ADMIN — OXYCODONE HYDROCHLORIDE AND ACETAMINOPHEN 1 TABLET: 5; 325 TABLET ORAL at 20:27

## 2018-10-08 RX ADMIN — Medication 10 ML: at 13:59

## 2018-10-08 RX ADMIN — OXYCODONE HYDROCHLORIDE AND ACETAMINOPHEN 1 TABLET: 5; 325 TABLET ORAL at 01:02

## 2018-10-08 RX ADMIN — OXYCODONE HYDROCHLORIDE AND ACETAMINOPHEN 1 TABLET: 5; 325 TABLET ORAL at 06:59

## 2018-10-08 RX ADMIN — Medication 10 ML: at 22:46

## 2018-10-08 RX ADMIN — LEVOTHYROXINE SODIUM 88 MCG: 88 TABLET ORAL at 06:59

## 2018-10-08 RX ADMIN — COLCHICINE 0.6 MG: 0.6 TABLET, FILM COATED ORAL at 09:36

## 2018-10-08 RX ADMIN — LISINOPRIL 20 MG: 20 TABLET ORAL at 08:58

## 2018-10-08 RX ADMIN — VANCOMYCIN HYDROCHLORIDE 1750 MG: 10 INJECTION, POWDER, LYOPHILIZED, FOR SOLUTION INTRAVENOUS at 17:51

## 2018-10-08 NOTE — PROGRESS NOTES
Problem: Mobility Impaired (Adult and Pediatric)  Goal: *Acute Goals and Plan of Care (Insert Text)  Physical Therapy Goals  Initiated 10/8/2018  1. Patient will move from supine to sit and sit to supine  in bed with independence within 7 day(s). 2.  Patient will transfer from bed to chair and chair to bed with modified independence using the least restrictive device within 7 day(s). 3.  Patient will perform sit to stand with modified independence within 7 day(s). 4.  Patient will ambulate with modified independence for 75 feet with the least restrictive device within 7 day(s). physical Therapy EVALUATION  Patient: Josef June (14 y.o. male)  Date: 10/8/2018  Primary Diagnosis: Foot ulcer ([address]  infected left foot  ULCER LEFT HEEL   Procedure(s) (LRB):  LEFT FOOT WOUND AND LEFT HEEL  DEBRIDEMENT AND GRAFIX APPLICATION TO LEFT FOOT AND LEFT HEEL. (Left) 7 Days Post-Op   Precautions:   Fall, PWB with post op shoe    ASSESSMENT :  Based on the objective data described below, the patient presents with history of stoke with residual  left sided weakness and contracted LUE with minimal function, unstable gait with history of multiple falls with last fall occurring last week, and non-healing left foot ulcer x 1 year. Pt reports he lives alone with full flight of stairs to his apartment and ambulates with a straight cane. He only has one rail to enter apartment and therefore has been going up his steps backwards as he is only able to hold onto the rail with his right hand. Pt reports a friend has been looking into getting him a different apartment. Pt is S/P left foot wound and heel debridement with grafix application to left foot and heel POD 7. Pt has declined participating with PT on last 2 attempts however agreeable at this time. Discussed PWB restrictions and recommended trying a left platform walker however pt declined initially.   Pt is able to stand with supervision and ambulated with a straight cane x 30 feet with post op shoe however with foot flat and with WB > PWB. Discussed WB again and pt more agreeable to try a platform walker to distribute his more weight through his shoulders. Recommend trying platform walker on next session. Also recommended ambulating shorter distances while his foot is healing. Pt was assisted back to bed and sitting on EOB at end of session. Recommend SNF to maximize safe, functional mobility. Pt reports he has a friend that assist him but no other support systems. Patient will benefit from skilled intervention to address the above impairments. Patients rehabilitation potential is considered to be Good  Factors which may influence rehabilitation potential include:   []         None noted  []         Mental ability/status  [x]         Medical condition  []         Home/family situation and support systems  []         Safety awareness  [x]         Pain tolerance/management  []         Other:      PLAN :  Recommendations and Planned Interventions:  [x]           Bed Mobility Training             []    Neuromuscular Re-Education  [x]           Transfer Training                   []    Orthotic/Prosthetic Training  [x]           Gait Training                         []    Modalities  []           Therapeutic Exercises           []    Edema Management/Control  []           Therapeutic Activities            [x]    Patient and Family Training/Education  []           Other (comment):    Frequency/Duration: Patient will be followed by physical therapy  5 times a week to address goals. Discharge Recommendations: Kranthi Andrews  Further Equipment Recommendations for Discharge: to be determined      SUBJECTIVE:   Patient stated I have been walking to the bathroom and back.   \"The doc told me to walk on my heel\"    OBJECTIVE DATA SUMMARY:   HISTORY:    Past Medical History:   Diagnosis Date    Aneurysm ([facility] [address]     (with stroke)    Bladder tumor     Family history of bladder cancer     Gout     Hypercholesterolemia     Hypertension     Lesion of bladder     Seizures ([facility] [address]     Stroke [facility]) 2006    left sided weakness    Thromboembolus ([facility] [address]     Thyroid disease     TIA (transient ischemic attack) 2012     Past Surgical History:   Procedure Laterality Date    HX UROLOGICAL  04/20/2018    Cystoscopy, TURBT (greater than 5 cm resected) Dr. Mono [facility], [facility].  KNEE ARTHROSCP HARV      left knee    TRANSURETHRAL RESEC BLADDER NECK  08/10/2018     Prior Level of Function/Home Situation: ambulates with a straight cane, pt reports he falls at home \"a lot\", last fall was one week ago, pt has left sided weakness and minimal function in LUE  Personal factors and/or comorbidities impacting plan of care: L sided weakness, unstable gait with history of multiple falls, lives alone in apartment with full flight of stairs, has a friend that helps him    Home Situation  Home Environment: Apartment  # Steps to Enter: 23  Rails to Enter: Yes  Hand Rails : Left  One/Two Story Residence: One story  # of Interior Steps: 19  Living Alone: Yes  Support Systems: Friends \ neighbors  Patient Expects to be Discharged to[de-identified] Skilled nursing facility  Current DME Used/Available at Home: Cane, straight  Tub or Shower Type: Tub/Shower combination    EXAMINATION/PRESENTATION/DECISION MAKING:   Critical Behavior:  Neurologic State: Alert, Appropriate for age  Orientation Level: Oriented X4  Cognition: Appropriate for age attention/concentration, Follows commands  Safety/Judgement: Lack of insight into deficits  Hearing:   Auditory  Auditory Impairment: None  Skin: dressing intact    Range Of Motion:  RUE/RLE within functional limits   LLE generally decreased, functional   LUE with non-functional ROM, contractions noted in all major joints                        Strength:     RUE/RLE within functional limits    LLE generally decreased, functional  LUE decreased, non-functional                  Tone & Sensation:    abnormal tone LUE/LLE                              Coordination:   decreased coordination LUE/LLE       Functional Mobility:  Bed Mobility:   not assessed, OOB in chair            Transfers:  Sit to Stand: Supervision  Stand to Sit: Supervision                       Balance:   Sitting: Intact  Standing: Intact; With support  Ambulation/Gait Training:  Distance (ft): 30 Feet (ft)  Assistive Device: postop shoe;Cane, straight  Ambulation - Level of Assistance: Contact guard assistance        Gait Abnormalities: Antalgic; Shuffling gait, foot flat on floor, WB > PWB, recommend trying a left platform walker, pt initially declined trying this but seems more agreeable to trying on next visit         Base of Support: Widened  Stance: Weight shift  Speed/Reva: Pace decreased (<100 feet/min)  Step Length: Left shortened;Right shortened               Functional Measure:  Tinetti test:    Sitting Balance: 1  Arises: 2  Attempts to Rise: 2  Immediate Standing Balance: 1  Standing Balance: 1  Nudged: 1  Eyes Closed: 0  Turn 360 Degrees - Continuous/Discontinuous: 1  Turn 360 Degrees - Steady/Unsteady: 1  Sitting Down: 2  Balance Score: 12  Indication of Gait: 1  R Step Length/Height: 1  L Step Length/Height: 0  R Foot Clearance: 1  L Foot Clearance: 0  Step Symmetry: 0  Step Continuity: 0  Path: 1  Trunk: 1  Walking Time: 0  Gait Score: 5  Total Score: 17       Tinetti Test and G-code impairment scale:  Percentage of Impairment CH    0%   CI    1-19% CJ    20-39% CK    40-59% CL    60-79% CM    80-99% CN     100%   Tinetti  Score 0-28 28 23-27 17-22 12-16 6-11 1-5 0       Tinetti Tool Score Risk of Falls  <19 = High Fall Risk  19-24 = Moderate Fall Risk  25-28 = Low Fall Risk  Tinetti ME. Performance-Oriented Assessment of Mobility Problems in Elderly Patients. [address]; [id number].  (Scoring Description: PT Bulletin Feb. 10, 1993)    Older adults: Savana Chase et al, 2009; n = [address] elderly evaluated with ABC, ABBE, ADL, and IADL)  · Mean ABBE score for males aged 69-68 years = 26.21(3.40)  · Mean ABBE score for females age 69-68 years = 25.16(4.30)  · Mean ABBE score for males over 80 years = 23.29(6.02)  · Mean ABBE score for females over 80 years = 17.20(8.32)         G codes: In compliance with CMSs Claims Based Outcome Reporting, the following G-code set was chosen for this patient based on their primary functional limitation being treated: The outcome measure chosen to determine the severity of the functional limitation was the Tinetti with a score of 17/28 which was correlated with the impairment scale.     ? Mobility - Walking and Moving Around:     - CURRENT STATUS: CJ - 20%-39% impaired, limited or restricted    - GOAL STATUS: CI - 1%-19% impaired, limited or restricted    - D/C STATUS:  ---------------To be determined---------------      Physical Therapy Evaluation Charge Determination   History Examination Presentation Decision-Making   MEDIUM  Complexity : 1-2 comorbidities / personal factors will impact the outcome/ POC  MEDIUM Complexity : 3 Standardized tests and measures addressing body structure, function, activity limitation and / or participation in recreation  LOW Complexity : Stable, uncomplicated  MEDIUM Complexity : FOTO score of 26-74      Based on the above components, the patient evaluation is determined to be of the following complexity level: LOW     Pain:  Pain Scale 1: Numeric (0 - 10)  Pain Intensity 1: 10  Pain Location 1: Leg;Foot  Pain Orientation 1: Left  Pain Description 1: Aching  Pain Intervention(s) 1: Repositioned  Activity Tolerance:   Fair, limited by severe pain with minimal activity     After treatment:   []         Patient left in no apparent distress sitting up in chair  [x]         Patient left in no apparent distress in bed  [x]         Call bell left within reach  [x]         Nursing notified  []         Caregiver present  []         Bed alarm activated    COMMUNICATION/EDUCATION:   The patients plan of care was discussed with: Registered Nurse. [x]         Fall prevention education was provided and the patient/caregiver indicated understanding. []         Patient/family have participated as able in goal setting and plan of care. [x]         Patient/family agree to work toward stated goals and plan of care. []         Patient understands intent and goals of therapy, but is neutral about his/her participation. []         Patient is unable to participate in goal setting and plan of care.     Thank you for this referral.  Amy Verne Heimlich   Time Calculation: 18 mins

## 2018-10-08 NOTE — PROGRESS NOTES
ID Progress Note  10/8/2018    Subjective:     Still with a lot of pain    Objective:     Antibiotics:  1. Vancomycin   2. Rocephin      Vitals:   Visit Vitals    /72    Pulse 68    Temp 98.7 °F (37.1 °C)    Resp 18    Ht 6' 1\" (1.854 m)    Wt 103.4 kg (228 lb)    SpO2 98%    BMI 30.08 kg/m2        Tmax:  Temp (24hrs), Av.1 °F (36.7 °C), Min:97.3 °F (36.3 °C), Max:98.7 °F (37.1 °C)      Exam:  Lungs:  clear to auscultation bilaterally  Heart:  regular rate and rhythm  Abdomen:  soft, non-tender. Bowel sounds normal. No masses,  no organomegaly  No proximal cellulitis of leg    Labs:      Recent Labs      10/08/18   0426  10/07/18   0518  10/06/18   0508   WBC  9.4  8.2  9.9   HGB  11.9*  11.8*  12.1   PLT  217  215  223   BUN  15  14  12   CREA  0.74  0.69*  0.68*       Cultures:     Lab Results   Component Value Date/Time    Specimen Description: BLOOD 2010 10:55 PM    Specimen Description: BLOOD 2009 11:30 PM     Lab Results   Component Value Date/Time    Culture result: NO ANAEROBES ISOLATED 10/01/2018 01:47 PM    Culture result: MODERATE DIPHTHEROIDS (A) 10/01/2018 01:47 PM    Culture result: FEW ALCALIGENES FAECALIS (A) 10/01/2018 01:47 PM       Radiology:     Line/Insert Date:           Assessment:     1. STI/ulceration of the left foot  2. Polymicrobial la from culture  3. Pain issues    Objective:     1. Continue current therapy for now  2. Wound care  3.  Home on oral antibiotics when ready to go    Arabella Preston MD

## 2018-10-08 NOTE — PROGRESS NOTES
Hospitalist Progress Note  Yaritza Kingsley MD  Answering service: [phone] from in house phone  Cell: [phone]      Date of Service:  10/8/2018  NAME:  Sheba Pierce  :  1965  MRN:  [id number]      Admission Summary:   A 47 y.o man with a history of CVA, HTN, chronic pain, chronic left foot ulcer who presents with worsening of his ulcers. He reports an ulcer on the dorsum of his left foot that is reportedly the result of trauma that has been present for about a year, and a few weeks ago developed another ulcer on the medial aspect of the foot. He reports following in a wound care clinic and recently being prescribed ciprofloxacin and clindamycin which did not help. For the past week, the ulcers have been increasingly more painful and he noted yellow drainage from them. He denies fevers. Interval history / Subjective:     Patient seen and examined by the bedside  Labs, images and notes reviewed  Pt cont to complain of pain with minimal improvement since admission, non compliant with PTOT  Afebrile  No nausea  DW nursing staff     Assessment & Plan:     Chronic left heel infected ulcer (POA) s/p left foot wound and left heel debridement and Grafix application  By Dr. Evens Angeles  -?traumatic, appear mildly infected with surrounding cellulitic changes at medial side of left foot and dorsal side anteriorly foot  - not improve with clindamycin and ciprofloxacin PO.  -IV vancomycin  - added Ceftriaxone 10/2  for GNR coverage given the wound culture report showing GNR, final pending  -x ray right foot  soft tissue swelling along the dorsum of the foot. .  -wound care consulted  -Podiatrist is on board- PWB, with surgical shoe, foot elevation  -ID consulted 10/4  -dressing changes as per podiatry    10/5:  ID on board  Awaiting final cultures  Cont current ABX    10/6: wound cx: ALCALIGENES FAECALIS  sens to ceftriaxone  Cont current abx  ID to follow  PWB compliance urged to patient  Still complains of pain despite being on dilaudid and oxycodone    10/7:  Increased dilaudid to 1.5 mg Iv Q3 prn given intense pain  Cont IV abx  ID to follow on final cultures and give recs    Addendum: patient is belligerent, because his methadone fell off, and he requests to be back on methadone so we will stop Dilaudid IV as there is risk of adverse effects from opiates. 10/8: cont current IV abx pending ID recs  Added oxycodone and Ibuprofen for pain control    Non-compliance  -Pt is non-compliant with the PWB, refuses to participate in PT, stating that he cannot use his left left in PWB fashion due to pain although he was going back and forth from the bed to bathroom with out the surgical shoe.  -Care and instruction reinforced  -FARIDEH nursing staff   -Patient advised to ring bell to have help going to bathroom and back     HTN  -continue lisinopril and monitor BP    Sinus bradycardia  -asymptomatic  -HR 56-62     Chronic pain  -on  methadone     Gout  -stable, Allopurinol      Bladder cancer  -s/p surgery in April 2018  -stable     Hypothyroidism  -synthroid   -euthyroid, TSH WNL    Hx of hemorrhagic stroke with left sided hemiparesis   -patient uses wheelchair and walker.  But unable to do so now due to non weight bearing on the left as per podiatry    -continue supportive care    Chronic opiate dependence  - Methadone resumed as per patient's request      Code status: Full Code  DVT prophylaxis: SCD    Care Plan discussed with: Patient/Family and Nurse   FARIDEH POLANCO, working on the placement  Disposition: D     Hospital Problems  Date Reviewed: 10/1/2018          Codes Class Noted POA    * (Principal)Foot ulcer ([facility] [address] ICD-10-CM: L97.509  ICD-9-CM: 707.15  9/28/2018 Unknown        Stroke ([facility] [address] ICD-10-CM: I63.9  ICD-9-CM: 434.91  3/11/2011 Yes        Hypertension ICD-10-CM: I10  ICD-9-CM: 401.9  3/11/2011 Yes              Vital Signs:    Last 24hrs VS reviewed since prior progress note. Most recent are:  Visit Vitals    /78 (BP 1 Location: Left arm, BP Patient Position: At rest)    Pulse (!) 58    Temp 97.3 °F (36.3 °C)    Resp 18    Ht 6' 1\" (1.854 m)    Wt 103.4 kg (228 lb)    SpO2 96%    BMI 30.08 kg/m2         Intake/Output Summary (Last 24 hours) at 10/08/18 1228  Last data filed at 10/08/18 1200   Gross per 24 hour   Intake             9860 ml   Output             4300 ml   Net             5560 ml        Physical Examination:             Constitutional:  No acute distress, cooperative, pleasant    ENT:  Oral mucous moist, oropharynx benign. Neck supple,    Resp:  CTA bilaterally. No wheezing/rhonchi/rales. No accessory muscle use   CV:  Regular rhythm, normal rate, no murmurs, gallops, rubs    GI:  Soft, non distended, non tender. normoactive bowel sounds, no hepatosplenomegaly     Musculoskeletal:  No edema, left foot currently dressed    Neurologic:  Conscious and well oriented, right extremities 5/5, LUE 1/5, LLE 4/5     Psych:  Good insight, Not anxious nor agitated. Data Review:    Review and/or order of clinical lab test      Labs:     Recent Labs      10/08/18   0426  10/07/18   0518   WBC  9.4  8.2   HGB  11.9*  11.8*   HCT  36.9  37.2   PLT  217  215     Recent Labs      10/08/18   0426  10/07/18   0518  10/06/18   0508   NA  139  138  137   K  4.4  4.5  4.5   CL  102  104  103   CO2  28  27  29   BUN  15  14  12   CREA  0.74  0.69*  0.68*   GLU  119*  116*  103*   CA  8.8  8.6  8.5     No results for input(s): SGOT, GPT, ALT, AP, TBIL, TBILI, TP, ALB, GLOB, GGT, AML, LPSE in the last 72 hours. No lab exists for component: AMYP, HLPSE  No results for input(s): INR, PTP, APTT in the last 72 hours. No lab exists for component: INREXT, INREXT   No results for input(s): FE, TIBC, PSAT, FERR in the last 72 hours.    Lab Results   Component Value Date/Time    Folate 8.6 12/13/2012 03:20 AM      No results for input(s): PH, PCO2, PO2 in the last 72 hours. No results for input(s): CPK, CKNDX, TROIQ in the last 72 hours.     No lab exists for component: CPKMB  Lab Results   Component Value Date/Time    Cholesterol, total 152 12/13/2012 03:30 AM    HDL Cholesterol 26 12/13/2012 03:30 AM    LDL, calculated 92.2 12/13/2012 03:30 AM    Triglyceride 169 (H) 12/13/2012 03:30 AM    CHOL/HDL Ratio 5.8 (H) 12/13/2012 03:30 AM     Lab Results   Component Value Date/Time    Glucose (POC) 150 (H) 03/19/2018 11:45 AM    Glucose (POC) 123 (H) 03/18/2018 08:55 PM    Glucose (POC) 96 03/16/2018 04:58 PM    Glucose (POC) 116 (H) 12/12/2012 12:42 PM    Glucose (POC) 99 12/11/2012 11:27 PM     Lab Results   Component Value Date/Time    Color YELLOW/STRAW 07/31/2018 05:52 PM    Appearance CLEAR 07/31/2018 05:52 PM    Specific gravity 1.006 07/31/2018 05:52 PM    pH (UA) 6.0 07/31/2018 05:52 PM    Protein NEGATIVE  07/31/2018 05:52 PM    Glucose NEGATIVE  07/31/2018 05:52 PM    Ketone NEGATIVE  07/31/2018 05:52 PM    Bilirubin NEGATIVE  07/31/2018 05:52 PM    Urobilinogen 0.2 07/31/2018 05:52 PM    Nitrites NEGATIVE  07/31/2018 05:52 PM    Leukocyte Esterase NEGATIVE  07/31/2018 05:52 PM    Epithelial cells FEW 07/31/2018 05:52 PM    Bacteria NEGATIVE  07/31/2018 05:52 PM    WBC 0-4 07/31/2018 05:52 PM    RBC 0-5 07/31/2018 05:52 PM     All Micro Results     Procedure Component Value Units Date/Time    CULTURE, ANAEROBIC [[id number]] Collected:  10/01/18 1347    Order Status:  Completed Specimen:  Wound Updated:  10/05/18 1501     Special Requests: FOOT,LEFT     Culture result: NO ANAEROBES ISOLATED       CULTURE, WOUND Homero Dayhoff STAIN [[id number]]  (Abnormal) Collected:  09/29/18 0041    Order Status:  Completed Specimen:  Wound from Foot Updated:  10/05/18 0950     Special Requests: NO SPECIAL REQUESTS        GRAM STAIN NO ORGANISMS SEEN        Culture result: HEAVY DIPHTHEROIDS (A)                 LIGHT GRAM NEGATIVE RODS SENDING TO REFERENCE LABORATORY FOR IDENTIFICATION (A)      PLEASE REFER TO [id number], FOR SUBSEQUENT RESULTS    CULTURE, WOUND Song Uli STAIN [[id number]]  (Abnormal)  (Susceptibility) Collected:  10/01/18 1347    Order Status:  Completed Specimen:  Wound Updated:  10/05/18 0949     Special Requests: Sanya Fredi STAIN RARE WBCS SEEN         NO ORGANISMS SEEN        Culture result: MODERATE DIPHTHEROIDS (A)                 FEW ALCALIGENES FAECALIS (A)    SUSCEPTIBILITY, AER + ANAEROB [[id number]] Collected:  09/29/18 0041    Order Status:  Completed Updated:  10/05/18 0725    ORGANISM ID BY MALDI [[id number]] Collected:  09/29/18 0041    Order Status:  Completed Updated:  10/05/18 0725            Medications Reviewed:     Current Facility-Administered Medications   Medication Dose Route Frequency    oxyCODONE-acetaminophen (PERCOCET) 5-325 mg per tablet 1 Tab  1 Tab Oral Q4H PRN    ibuprofen (MOTRIN) tablet 600 mg  600 mg Oral Q6H PRN    methadone (DOLOPHINE) 5 mg/5 mL oral solution 115 mg  115 mg Oral DAILY    colchicine tablet 0.6 mg  0.6 mg Oral DAILY PRN    cefTRIAXone (ROCEPHIN) 1 g in 0.9% sodium chloride (MBP/ADV) 50 mL  1 g IntraVENous Q24H    zolpidem (AMBIEN) tablet 5 mg  5 mg Oral QHS PRN    vancomycin (VANCOCIN) 1750 mg in  ml infusion  1,750 mg IntraVENous Q12H    levothyroxine (SYNTHROID) tablet 88 mcg  88 mcg Oral ACB    lisinopril (PRINIVIL, ZESTRIL) tablet 20 mg  20 mg Oral DAILY    tamsulosin (FLOMAX) capsule 0.4 mg  0.4 mg Oral DAILY    sodium chloride (NS) flush 5-10 mL  5-10 mL IntraVENous Q8H    sodium chloride (NS) flush 5-10 mL  5-10 mL IntraVENous PRN    acetaminophen (TYLENOL) tablet 650 mg  650 mg Oral Q4H PRN    ondansetron (ZOFRAN) injection 4 mg  4 mg IntraVENous Q4H PRN    Vancomycin Pharmacy to Dose   Other Rx Dosing/Monitoring     ______________________________________________________________________  EXPECTED LENGTH OF STAY: 4d 2h  ACTUAL LENGTH OF STAY:          10                 Diomedes Linares Paul Guerin MD

## 2018-10-08 NOTE — PROGRESS NOTES
Reviewed medical chart. Spoke with Addison Gamez [phone]) to discuss patient's status. Patient has been accepted pending reimbursement rate through insurance coverage. Lauren has spoken with Ismael Sheldon CCCP  to discuss reimbursement rates for placement. Per report, Constantine Zamora will follow-up with Kimmy upon receiving info on insurance coverage for SNF placement. SHERRI will continue to follow.   Elaina Mehta, MSW,CRM

## 2018-10-08 NOTE — PROGRESS NOTES
Progress Note    Patient: Jason Romero MRN: [id number]  SSN: [id number]    YOB: 1965  Age: 47 y.o. Sex: male      Admit Date: 9/28/2018  7 Days Post-Op     Procedure:   Procedure(s):  LEFT FOOT WOUND AND LEFT HEEL  DEBRIDEMENT AND GRAFIX APPLICATION TO LEFT FOOT AND LEFT HEEL. Subjective:     Patient seen resting quiet and comfortably and no apparent distress. Pain on scale of 1-10 is 6. Status post: left heel and foot ulcer debridement and application of Grafix    Objective:     Visit Vitals    /78 (BP 1 Location: Left arm, BP Patient Position: At rest)    Pulse (!) 58    Temp 97.3 °F (36.3 °C)    Resp 18    Ht 6' 1\" (1.854 m)    Wt 103.4 kg (228 lb)    SpO2 96%    BMI 30.08 kg/m2        Physical Exam:  normal DP and PT pulses and reduced sensation at left foot with dressing and ACE intact    Labs/Radiology Review: images and reports reviewed    Assessment:     Hospital Problems  Date Reviewed: 10/1/2018          Codes Class Noted POA    * (Principal)Foot ulcer ([facility] ICD-10-CM: L97.509  ICD-9-CM: 707.15  9/28/2018 Unknown        Stroke ([facility] ICD-10-CM: I63.9  ICD-9-CM: 434.91  3/11/2011 Yes        Hypertension ICD-10-CM: I10  ICD-9-CM: 401.9  3/11/2011 Yes              Plan/Recommendations/Medical Decision Making:     Continue present treatment    Activity: PWB to left foot with surgical shoe    Discontinue: n/a    Diet: regular    Education: Ulcer care   Continue ABX per ID.   PWB left foot with surgical shoe at PT  OK for DC from my standpoint  Possible 2nd Grafix application Wednesday if patient is still here    Signed By: Karlie Quinones DPM     October 8, 2018

## 2018-10-08 NOTE — PROGRESS NOTES
Pharmacist Note - Vancomycin Dosing  Therapy day 10  Indication: Left foot ulcer s/p left heel/foot ulcer debridements with Grafix application  Current regimen: 1750 mg q12h    A Trough Level resulted at 18.6 mcg/mL which was obtained 9.25 hrs post-dose. The extrapolated \"true\" trough is approximately 14.5 mcg/mL based on the patient's known kinetics. Goal trough: 10 - 15 mcg/mL     Plan: Continue current regimen. Pharmacy will continue to monitor this patient daily for changes in clinical status and renal function.

## 2018-10-09 LAB
ANION GAP SERPL CALC-SCNC: 8 MMOL/L (ref 5–15)
BASOPHILS # BLD: 0.1 K/UL (ref 0–0.1)
BASOPHILS NFR BLD: 1 % (ref 0–1)
BUN SERPL-MCNC: 15 MG/DL (ref 6–20)
BUN/CREAT SERPL: 20 (ref 12–20)
CALCIUM SERPL-MCNC: 8.7 MG/DL (ref 8.5–10.1)
CHLORIDE SERPL-SCNC: 102 MMOL/L (ref 97–108)
CO2 SERPL-SCNC: 27 MMOL/L (ref 21–32)
CREAT SERPL-MCNC: 0.75 MG/DL (ref 0.7–1.3)
DIFFERENTIAL METHOD BLD: ABNORMAL
EOSINOPHIL # BLD: 0.9 K/UL (ref 0–0.4)
EOSINOPHIL NFR BLD: 9 % (ref 0–7)
ERYTHROCYTE [DISTWIDTH] IN BLOOD BY AUTOMATED COUNT: 15.6 % (ref 11.5–14.5)
GLUCOSE SERPL-MCNC: 115 MG/DL (ref 65–100)
HCT VFR BLD AUTO: 37.2 % (ref 36.6–50.3)
HGB BLD-MCNC: 12 G/DL (ref 12.1–17)
IMM GRANULOCYTES # BLD: 0.1 K/UL (ref 0–0.04)
IMM GRANULOCYTES NFR BLD AUTO: 1 % (ref 0–0.5)
LYMPHOCYTES # BLD: 2.7 K/UL (ref 0.8–3.5)
LYMPHOCYTES NFR BLD: 27 % (ref 12–49)
MCH RBC QN AUTO: 29.1 PG (ref 26–34)
MCHC RBC AUTO-ENTMCNC: 32.3 G/DL (ref 30–36.5)
MCV RBC AUTO: 90.1 FL (ref 80–99)
MONOCYTES # BLD: 1.2 K/UL (ref 0–1)
MONOCYTES NFR BLD: 12 % (ref 5–13)
NEUTS SEG # BLD: 4.9 K/UL (ref 1.8–8)
NEUTS SEG NFR BLD: 50 % (ref 32–75)
NRBC # BLD: 0 K/UL (ref 0–0.01)
NRBC BLD-RTO: 0 PER 100 WBC
PLATELET # BLD AUTO: 223 K/UL (ref 150–400)
PMV BLD AUTO: 10.5 FL (ref 8.9–12.9)
POTASSIUM SERPL-SCNC: 4.4 MMOL/L (ref 3.5–5.1)
RBC # BLD AUTO: 4.13 M/UL (ref 4.1–5.7)
SODIUM SERPL-SCNC: 137 MMOL/L (ref 136–145)
WBC # BLD AUTO: 9.9 K/UL (ref 4.1–11.1)

## 2018-10-09 PROCEDURE — 74011250637 HC RX REV CODE- 250/637: Performed by: HOSPITALIST

## 2018-10-09 PROCEDURE — 74011250637 HC RX REV CODE- 250/637: Performed by: INTERNAL MEDICINE

## 2018-10-09 PROCEDURE — 97116 GAIT TRAINING THERAPY: CPT

## 2018-10-09 PROCEDURE — 74011000258 HC RX REV CODE- 258: Performed by: INTERNAL MEDICINE

## 2018-10-09 PROCEDURE — 74011250636 HC RX REV CODE- 250/636: Performed by: INTERNAL MEDICINE

## 2018-10-09 PROCEDURE — 85025 COMPLETE CBC W/AUTO DIFF WBC: CPT | Performed by: INTERNAL MEDICINE

## 2018-10-09 PROCEDURE — 97530 THERAPEUTIC ACTIVITIES: CPT

## 2018-10-09 PROCEDURE — 36415 COLL VENOUS BLD VENIPUNCTURE: CPT | Performed by: INTERNAL MEDICINE

## 2018-10-09 PROCEDURE — 65270000029 HC RM PRIVATE

## 2018-10-09 PROCEDURE — 74011250636 HC RX REV CODE- 250/636: Performed by: HOSPITALIST

## 2018-10-09 PROCEDURE — 80048 BASIC METABOLIC PNL TOTAL CA: CPT | Performed by: INTERNAL MEDICINE

## 2018-10-09 RX ORDER — AMOXICILLIN 250 MG
1 CAPSULE ORAL 2 TIMES DAILY
Status: DISCONTINUED | OUTPATIENT
Start: 2018-10-09 | End: 2018-10-12 | Stop reason: HOSPADM

## 2018-10-09 RX ADMIN — IBUPROFEN 600 MG: 600 TABLET ORAL at 06:51

## 2018-10-09 RX ADMIN — Medication 10 ML: at 17:06

## 2018-10-09 RX ADMIN — ZOLPIDEM TARTRATE 5 MG: 5 TABLET ORAL at 23:28

## 2018-10-09 RX ADMIN — CEFTRIAXONE 1 G: 1 INJECTION, POWDER, FOR SOLUTION INTRAMUSCULAR; INTRAVENOUS at 20:47

## 2018-10-09 RX ADMIN — TAMSULOSIN HYDROCHLORIDE 0.4 MG: 0.4 CAPSULE ORAL at 08:51

## 2018-10-09 RX ADMIN — OXYCODONE HYDROCHLORIDE AND ACETAMINOPHEN 1 TABLET: 5; 325 TABLET ORAL at 18:33

## 2018-10-09 RX ADMIN — OXYCODONE HYDROCHLORIDE AND ACETAMINOPHEN 1 TABLET: 5; 325 TABLET ORAL at 04:26

## 2018-10-09 RX ADMIN — OXYCODONE HYDROCHLORIDE AND ACETAMINOPHEN 1 TABLET: 5; 325 TABLET ORAL at 22:19

## 2018-10-09 RX ADMIN — LISINOPRIL 20 MG: 20 TABLET ORAL at 08:51

## 2018-10-09 RX ADMIN — VANCOMYCIN HYDROCHLORIDE 1750 MG: 10 INJECTION, POWDER, LYOPHILIZED, FOR SOLUTION INTRAVENOUS at 17:06

## 2018-10-09 RX ADMIN — METHADONE HYDROCHLORIDE 115 MG: 5 SOLUTION ORAL at 08:55

## 2018-10-09 RX ADMIN — Medication 10 ML: at 04:33

## 2018-10-09 RX ADMIN — Medication 10 ML: at 22:19

## 2018-10-09 RX ADMIN — VANCOMYCIN HYDROCHLORIDE 1750 MG: 10 INJECTION, POWDER, LYOPHILIZED, FOR SOLUTION INTRAVENOUS at 04:32

## 2018-10-09 RX ADMIN — OXYCODONE HYDROCHLORIDE AND ACETAMINOPHEN 1 TABLET: 5; 325 TABLET ORAL at 00:36

## 2018-10-09 RX ADMIN — IBUPROFEN 600 MG: 600 TABLET ORAL at 20:07

## 2018-10-09 RX ADMIN — OXYCODONE HYDROCHLORIDE AND ACETAMINOPHEN 1 TABLET: 5; 325 TABLET ORAL at 08:51

## 2018-10-09 RX ADMIN — LEVOTHYROXINE SODIUM 88 MCG: 88 TABLET ORAL at 06:51

## 2018-10-09 RX ADMIN — SENNOSIDES AND DOCUSATE SODIUM 1 TABLET: 8.6; 5 TABLET ORAL at 22:19

## 2018-10-09 RX ADMIN — OXYCODONE HYDROCHLORIDE AND ACETAMINOPHEN 1 TABLET: 5; 325 TABLET ORAL at 13:01

## 2018-10-09 NOTE — PROGRESS NOTES
Reviewed medical chart; spoke with the patient's , Sumit Orourke, R#[phone] to provide an update again today.         Informed the insurance CM that the patient has been denied services at the following facilities:  · [place]  · [address]  · [facility]   · [place]   · [address] [facility]  · [facility]   · [facility]     Referrals are pending at the following facilities. Called to ask that admissions staff make a decision regarding acceptance as soon as possible:  · [address] with Taisha Little in admissions   · [address] with Javier Ybarra in admissions. · [facility] of [address] [facility] with Kwaku Mckeon, admissions liaison for all [address] [facility] facilities. · [facility] of [address] [facility] from admissions called to explain that they may be able to accept him at discharge. They are a non-smoking facility, however. Spoke to the patient who stated \"My life is more important than a cigarette. I don't need to spoke and I started Chantix 2 months ago. \"  Updated admissions. Due to difficulty in finding an accepting facility, the search was expanded to all facilities within a 30 mile radius of the patient's home. Referrals were placed via [facility] and [facility]. Care Management will continue to follow his disposition.    JUVENCIO Maldonado

## 2018-10-09 NOTE — PROGRESS NOTES
Bedside shift change report given to [facility] (oncoming nurse) by Lopez Pulido (offgoing nurse). Report included the following information SBAR, Kardex, Intake/Output, MAR and Recent Results.

## 2018-10-09 NOTE — PROGRESS NOTES
Problem: Mobility Impaired (Adult and Pediatric)  Goal: *Acute Goals and Plan of Care (Insert Text)  Physical Therapy Goals  Initiated 10/8/2018  1. Patient will move from supine to sit and sit to supine  in bed with independence within 7 day(s). 2.  Patient will transfer from bed to chair and chair to bed with modified independence using the least restrictive device within 7 day(s). 3.  Patient will perform sit to stand with modified independence within 7 day(s). 4.  Patient will ambulate with modified independence for 75 feet with the least restrictive device within 7 day(s). physical Therapy TREATMENT  Patient: Josef June (67 y.o. male)  Date: 10/9/2018  Diagnosis: Foot ulcer ([address]  infected left foot  ULCER LEFT HEEL  Foot ulcer ([address]  Procedure(s) (LRB):  LEFT FOOT WOUND AND LEFT HEEL  DEBRIDEMENT AND GRAFIX APPLICATION TO LEFT FOOT AND LEFT HEEL. (Left) 8 Days Post-Op  Precautions: Fall, PWB  Chart, physical therapy assessment, plan of care and goals were reviewed. ASSESSMENT:  Pt was agreeable to a short gait due to pain in left foot. Pt was able to verbalize heel WB on left. pt unable to perform heel weightbearing due to old CVA. Pt has a hemiparetic gait with limited DF strength. Pt reports having an AFO but that is what caused wound. Pt has to do 19 steps to get to apartment. Pt is planning on returning to the OR tomorrow. Will continue progress as able. Pt could benefit from rehab at discharge to progress mobility and independence. Progression toward goals:  [x]    Improving appropriately and progressing toward goals  []    Improving slowly and progressing toward goals  []    Not making progress toward goals and plan of care will be adjusted     PLAN:  Patient continues to benefit from skilled intervention to address the above impairments. Continue treatment per established plan of care.   Discharge Recommendations:  rehab  Further Equipment Recommendations for Discharge:  KRANTHI SUBJECTIVE:   Patient stated I can't do the 19 steps to get to my apartment. Yudy Delgado    OBJECTIVE DATA SUMMARY:   Critical Behavior:  Neurologic State: Appropriate for age  Orientation Level: Oriented X4  Cognition: Follows commands, Appropriate decision making, Appropriate for age attention/concentration, Appropriate safety awareness  Safety/Judgement: Lack of insight into deficits  Functional Mobility Training:  Bed Mobility:        Sit to Supine: Modified independent           Transfers:  Sit to Stand: Supervision  Stand to Sit: Supervision                             Balance:  Sitting: Intact  Standing: Impaired  Standing - Static: Good  Standing - Dynamic : Good  Ambulation/Gait Training:  Distance (ft): 40 Feet (ft)  Assistive Device: Gait belt (post op shoe)  Ambulation - Level of Assistance: Stand-by assistance        Gait Abnormalities: Antalgic;Decreased step clearance; Hemiplegic     Left Side Weight Bearing: Heel;Partial (%)  Base of Support: Widened     Speed/Reva: Pace decreased (<100 feet/min)  Step Length: Left shortened;Right shortened                   Stairs:              Neuro Re-Education:    Therapeutic Exercises:     Pain:  Pain Scale 1: Numeric (0 - 10)  Pain Intensity 1: 9  Pain Location 1: Foot  Pain Orientation 1: Left  Pain Description 1: Constant; Aching  Pain Intervention(s) 1: Medication (see MAR)  Activity Tolerance:   Limited   Please refer to the flowsheet for vital signs taken during this treatment.   After treatment:   []    Patient left in no apparent distress sitting up in chair  [x]    Patient left in no apparent distress in bed  [x]    Call bell left within reach  [x]    Nursing notified  []    Caregiver present  []    Bed alarm activated    COMMUNICATION/COLLABORATION:   The patients plan of care was discussed with: Registered Nurse    Manas Pierce PTA   Time Calculation: 18 mins

## 2018-10-09 NOTE — PROGRESS NOTES
Bedside shift change report given to Corinne (oncoming nurse) by Ame Gamboa (offgoing nurse). Report included the following information SBAR.

## 2018-10-09 NOTE — PROGRESS NOTES
Hospitalist Progress Note  Donna Celeste MD  Answering service: [phone] from in house phone      Date of Service:  10/9/2018  NAME:  Ney Liang  :  1965  MRN:  [id number]      Admission Summary:   46 yo man with h/o stroke, HTN, chronic pain, chronic left foot ulcer presented to the ED from home on 18 with worsening of his ulcers and yellowish drainage. He reports an ulcer on the dorsum of his left foot that is reportedly the result of trauma that has been present for about a year, and a few weeks ago developed another ulcer on the medial aspect of the foot. He reports following in a wound care clinic and recently being prescribed ciprofloxacin and clindamycin which did not help. Interval history / Subjective:   C/o 8/10 pain in the left foot and constipation; d/w nurse and CM     Assessment & Plan:     Chronic left heel infected ulcer (POA) - s/p left foot wound and left heel debridement and Grafix application 85/5 by Dr Claire Ward  -?traumatic, appears mildly infected with surrounding cellulitic changes at medial side of left foot and dorsal side anteriorly foot  - no improvement with clindamycin and ciprofloxacin PO outpatient  - wound Cx 38/5 diphtheroids, Alicaligenes  - currently on vanc and ceftriaxone  - XR right foot  soft tissue swelling along the dorsum of the foot  - wound care consulted  - Podiatrist following: PWB, with surgical shoe, foot elevation; dressing changes  - plan for 2nd Grafix application 87/32  - ID following  - pain control; difficult as pt on a number of opiates     Per previous hospitalist, \"patient is belligerent, because his methadone fell off, and he requests to be back on methadone so we will stop Dilaudid IV as there is risk of adverse effects from opiates. \"     Nonadherence to medical treatment - pt is non-compliant with the PWB, refuses to participate in PT, stating that he cannot use his left foot in PWB fashion due to pain although he was going back and forth from the bed to bathroom without the surgical shoe  - care and instruction reinforced  - d/w nursing staff   - patient advised to ring bell to have help going to bathroom and back      HTN - continue lisinopril     Sinus bradycardia - asymptomatic  - check ECG due to HR 50s      Chronic pain - on  methadone  - need to confirm outpatient methadone presciber      Gout - controlled with allopurinol       H/o bladder cancer s/p surgery 4/2018 - stable      Hypothyroidism - TSH WNL; continue levothyroxine     H/o hemorrhagic stroke with left sided hemiparesis   - patient uses wheelchair and walker but unable to do so now due to [facility] status on left as per Podiatry       Chronic opiate dependence - methadone resumed as per patient's request    Constipation - bowel regimen     Code status: Full Code  DVT prophylaxis: SCD     Care Plan discussed with: Patient/Family, Nurse and   Disposition: TBD d/w [facility] Problems  Date Reviewed: 10/1/2018          Codes Class Noted POA    * (Principal)Foot ulcer ([facility] [address] ICD-10-CM: L97.509  ICD-9-CM: 707.15  9/28/2018 Unknown        Stroke ([facility] [address] ICD-10-CM: I63.9  ICD-9-CM: 434.91  3/11/2011 Yes        Hypertension ICD-10-CM: I10  ICD-9-CM: 401.9  3/11/2011 Yes                Review of Systems:   Pertinent items are noted in HPI. Vital Signs:    Last 24hrs VS reviewed since prior progress note.  Most recent are:  Visit Vitals    /79    Pulse (!) 56    Temp 97.9 °F (36.6 °C)    Resp 18    Ht 6' 1\" (1.854 m)    Wt 103.4 kg (228 lb)    SpO2 97%    BMI 30.08 kg/m2         Intake/Output Summary (Last 24 hours) at 10/09/18 1500  Last data filed at 10/09/18 0655   Gross per 24 hour   Intake              750 ml   Output             2200 ml   Net            -1450 ml        Physical Examination:             Constitutional:  awake, no acute distress, cooperative, pleasant    ENT:  oral mucosa moist, oropharynx benign    Resp:  CTA bilaterally, no wheezing/rhonchi/rales   CV:  regular rhythm, normal rate, no m/r/g appreciated    GI:  +BS, soft, non distended, non tender     Musculoskeletal:  BURNS except LUE and left foot    Neurologic:  AAOx3, left hemiparesis     Skin:  left foot dressing c/d/i in shoe       Data Review:    Review and/or order of clinical lab test  Review and/or order of tests in the radiology section of CPT  Review and/or order of tests in the medicine section of CPT      Labs:     Recent Labs      10/09/18   0500  10/08/18   0426   WBC  9.9  9.4   HGB  12.0*  11.9*   HCT  37.2  36.9   PLT  223  217     Recent Labs      10/09/18   0500  10/08/18   0426  10/07/18   0518   NA  137  139  138   K  4.4  4.4  4.5   CL  102  102  104   CO2  27  28  27   BUN  15  15  14   CREA  0.75  0.74  0.69*   GLU  115*  119*  116*   CA  8.7  8.8  8.6     No results for input(s): SGOT, GPT, ALT, AP, TBIL, TBILI, TP, ALB, GLOB, GGT, AML, LPSE in the last 72 hours. No lab exists for component: AMYP, HLPSE  No results for input(s): INR, PTP, APTT in the last 72 hours. No lab exists for component: INREXT   No results for input(s): FE, TIBC, PSAT, FERR in the last 72 hours. Lab Results   Component Value Date/Time    Folate 8.6 12/13/2012 03:20 AM      No results for input(s): PH, PCO2, PO2 in the last 72 hours. No results for input(s): CPK, CKNDX, TROIQ in the last 72 hours.     No lab exists for component: CPKMB  Lab Results   Component Value Date/Time    Cholesterol, total 152 12/13/2012 03:30 AM    HDL Cholesterol 26 12/13/2012 03:30 AM    LDL, calculated 92.2 12/13/2012 03:30 AM    Triglyceride 169 (H) 12/13/2012 03:30 AM    CHOL/HDL Ratio 5.8 (H) 12/13/2012 03:30 AM     Lab Results   Component Value Date/Time    Glucose (POC) 150 (H) 03/19/2018 11:45 AM    Glucose (POC) 123 (H) 03/18/2018 08:55 PM    Glucose (POC) 96 03/16/2018 04:58 PM    Glucose (POC) 116 (H) 12/12/2012 12:42 PM    Glucose (POC) 99 12/11/2012 11:27 PM     Lab Results   Component Value Date/Time    Color YELLOW/STRAW 07/31/2018 05:52 PM    Appearance CLEAR 07/31/2018 05:52 PM    Specific gravity 1.006 07/31/2018 05:52 PM    pH (UA) 6.0 07/31/2018 05:52 PM    Protein NEGATIVE  07/31/2018 05:52 PM    Glucose NEGATIVE  07/31/2018 05:52 PM    Ketone NEGATIVE  07/31/2018 05:52 PM    Bilirubin NEGATIVE  07/31/2018 05:52 PM    Urobilinogen 0.2 07/31/2018 05:52 PM    Nitrites NEGATIVE  07/31/2018 05:52 PM    Leukocyte Esterase NEGATIVE  07/31/2018 05:52 PM    Epithelial cells FEW 07/31/2018 05:52 PM    Bacteria NEGATIVE  07/31/2018 05:52 PM    WBC 0-4 07/31/2018 05:52 PM    RBC 0-5 07/31/2018 05:52 PM         Medications Reviewed:     Current Facility-Administered Medications   Medication Dose Route Frequency    oxyCODONE-acetaminophen (PERCOCET) 5-325 mg per tablet 1 Tab  1 Tab Oral Q4H PRN    ibuprofen (MOTRIN) tablet 600 mg  600 mg Oral Q6H PRN    methadone (DOLOPHINE) 5 mg/5 mL oral solution 115 mg  115 mg Oral DAILY    colchicine tablet 0.6 mg  0.6 mg Oral DAILY PRN    cefTRIAXone (ROCEPHIN) 1 g in 0.9% sodium chloride (MBP/ADV) 50 mL  1 g IntraVENous Q24H    zolpidem (AMBIEN) tablet 5 mg  5 mg Oral QHS PRN    vancomycin (VANCOCIN) 1750 mg in  ml infusion  1,750 mg IntraVENous Q12H    levothyroxine (SYNTHROID) tablet 88 mcg  88 mcg Oral ACB    lisinopril (PRINIVIL, ZESTRIL) tablet 20 mg  20 mg Oral DAILY    tamsulosin (FLOMAX) capsule 0.4 mg  0.4 mg Oral DAILY    sodium chloride (NS) flush 5-10 mL  5-10 mL IntraVENous Q8H    sodium chloride (NS) flush 5-10 mL  5-10 mL IntraVENous PRN    acetaminophen (TYLENOL) tablet 650 mg  650 mg Oral Q4H PRN    ondansetron (ZOFRAN) injection 4 mg  4 mg IntraVENous Q4H PRN    Vancomycin Pharmacy to Dose   Other Rx Dosing/Monitoring     ______________________________________________________________________  EXPECTED LENGTH OF STAY: 4d 2h  ACTUAL LENGTH OF STAY: [address] MD [place]

## 2018-10-09 NOTE — PROGRESS NOTES
Problem: Self Care Deficits Care Plan (Adult)  Goal: *Acute Goals and Plan of Care (Insert Text)  Occupational Therapy Goals  Initiated 10/3/2018  1. Patient will perform lower body dressing with minimal assistance/contact guard assist within 7 day(s) using AE PRN. 2.  Patient will perform grooming with supervision/set-up seated within 7 day(s). 3.  Patient will perform bathing with minimal assistance/contact guard assist within 7 day(s). 4.  Patient will perform toilet transfers with supervision/set-up while adhering to [facility] precaution within 7 day(s). 5.  Patient will perform all aspects of toileting with minimal assistance within 7 day(s) using DME PRN. 6.  Patient will adhere to [facility] precaution using surgical shoe with supervision within 7 day(s). 7.  Patient will utilize energy conservation techniques during functional activities with verbal cues within 7 day(s). Occupational Therapy TREATMENT  Patient: Jarret Jamison (23 y.o. male)  Date: 10/9/2018  Diagnosis: Foot ulcer ([address]  infected left foot  ULCER LEFT HEEL  Foot ulcer ([address]  Procedure(s) (LRB):  LEFT FOOT WOUND AND LEFT HEEL  DEBRIDEMENT AND GRAFIX APPLICATION TO LEFT FOOT AND LEFT HEEL. (Left) 8 Days Post-Op  Precautions: Fall, PWB  Chart, occupational therapy assessment, plan of care, and goals were reviewed. ASSESSMENT:  Patient received up in the chair, declining ADLs and mobility since he \"did that earlier already,\" reporting 8.5/10 pain. Discussed foam tubing in L hand, noted poor fitting as patient's fingernails still digging into his palm. Provided larger blue foam tubing requiring Total A to place in hand, foam & velcro attached for optimal fit as patient tends to fidget with tubing in his hand. Encouraged to be vigilant of discomfort & skin irritation on L hand, will follow-up for fit/comfort as needed.  Pending participation in ADLs and mobility, continue to recommend d/c to rehab to maximize safety, functional independence and mobility. Of note, patient with planned return to OR tomorrow for sx on LLE. Progression toward goals:  [x]       Improving appropriately and progressing toward goals  []       Improving slowly and progressing toward goals  []       Not making progress toward goals and plan of care will be adjusted     PLAN:  Patient continues to benefit from skilled intervention to address the above impairments. Continue treatment per established plan of care. Discharge Recommendations:  Rehab  Further Equipment Recommendations for Discharge:  TBD     SUBJECTIVE:   Patient stated You know that thing that babies are in? That's what they're putting on my foot to help it heal tomorrow.     OBJECTIVE DATA SUMMARY:   Cognitive/Behavioral Status:  Neurologic State: Alert  Orientation Level: Oriented X4  Cognition: Appropriate for age attention/concentration; Follows commands  Perception: Appears intact  Perseveration: No perseveration noted       Functional Mobility and Transfers for ADLs:  Bed Mobility:  Sit to Supine:  (in the chair throughout session)    Transfers:  Sit to Stand: Supervision          Balance:  Sitting: Intact  Standing: Impaired  Standing - Static: Good  Standing - Dynamic : Good    ADL Intervention:  Patient declined     Neuro Re-Education: Thick blue foam tubing provided for L hand for skin integrity & slow stretch of tendons          Pain:  Pain Scale 1: Numeric (0 - 10)  Pain Intensity 1: 8  Pain Location 1: Foot  Pain Orientation 1: Left     Pain Intervention(s) 1: Medication (see MAR)    Activity Tolerance:   Fair d/t high pain this session    Please refer to the flowsheet for vital signs taken during this treatment.   After treatment:   [x] Patient left in no apparent distress sitting up in chair  [] Patient left in no apparent distress in bed  [x] Call bell left within reach  [x] Nursing notified  [] Caregiver present  [] Bed alarm activated    COMMUNICATION/COLLABORATION:   The patients plan of care was discussed with: Physical Therapy Assistant and Registered Nurse    Christopher Boone, OT  Time Calculation: 23 mins

## 2018-10-09 NOTE — PROGRESS NOTES
Problem: Falls - Risk of  Goal: *Absence of Falls  Document Jordon Fall Risk and appropriate interventions in the flowsheet.    Outcome: Progressing Towards Goal  Fall Risk Interventions:  Mobility Interventions: Communicate number of staff needed for ambulation/transfer         Medication Interventions: Teach patient to arise slowly    Elimination Interventions: Patient to call for help with toileting needs

## 2018-10-09 NOTE — PROGRESS NOTES
Spoke with Nurse in dr Alverto Harrell office. MD was on unit earlier today but pt did not see him. Pt wanting to talk to MD about scheduled surgery for tomorrow. Pt states that Office called him.

## 2018-10-09 NOTE — ADT AUTH CERT NOTES
Utilization Review           Cellulitis - Care Day 10 (10/7/2018) by Alondra Murdock RN        Review Status Review Entered       Completed 10/8/2018       Details              Care Day: 10 Care Date: 10/7/2018 Level of Care: Inpatient Floor       Guideline Day 3        Clinical Status       (X) * Hemodynamic stability       (X) * Fever absent or improved       (X) * Skin exam stable or improved       (X) * Mental status at baseline       ( ) * Antibiotic treatment needs appropriate for next level of care       ( ) * Pain absent or manageable at next level of care       ( ) * Discharge plans and education understood              Activity       (X) * Ambulatory              Routes       (X) * Oral hydration, medications,[E]and diet       10/8/2018 2:50 PM EDT by [facility]         rocephin 1g IV q24hrs, methadone 115mg po qd, Vancomycin 1750mg IV q12hrs, dilaudid 1mg IV x3, oxycodone 1 tab po x3                     Interventions       (X) WBC       10/8/2018 2:50 PM EDT by [facility]         wbc 8.2, hgb 11.8, hct 37.2, gluc 116, creat 0.69                     Medications       (X) Parenteral or oral antibiotics[A]                                   * Milestone              Additional Notes       10/7/18                     VS: 98.0, 59, 16, 98% 135/85              Plan:       Chronic left heel infected ulcer (POA) s/p left foot wound and left heel debridement and Grafix application 10/2 By Dr. Kelly Santiago       -?traumatic, appear mildly infected with surrounding cellulitic changes at medial side of left foot and dorsal side anteriorly foot       - not improve with clindamycin and ciprofloxacin PO.       -IV vancomycin       - added Ceftriaxone 10/2  for GNR coverage given the wound culture report showing GNR, final pending       -x ray right foot 9/28 soft tissue swelling along the dorsum of the foot. .       -wound care consulted       -Podiatrist is on board- B, with surgical shoe, foot elevation       -ID consulted 10/4       -dressing changes as per podiatry               10/5:       ID on board       Awaiting final cultures       Cont current ABX               10/6: wound cx: ALCALIGENES FAECALIS       sens to ceftriaxone       Cont current abx       ID to follow       PWB compliance urged to patient       Still complains of pain despite being on dilaudid and oxycodone               10/7:       Increased dilaudid to 1.5 mg Iv Q3 prn given intense pain       Cont IV abx       ID to follow on final cultures and give recs               Addendum: patient is belligerent, because his methadone fell off, and he requests to be back on methadone so we will stop Dilaudid IV as there is risk of adverse effects from opiates.                Non-compliance       -Pt is non-compliant with the PWB, refuses to participate in PT, stating that he cannot use his left left in PWB fashion due to pain although he was going back and forth from the bed to bathroom with out the surgical shoe.       -Care and instruction reinforced       -DW nursing staff        -Patient advised to ring bell to have help going to bathroom and back                HTN       -continue lisinopril and monitor BP               Sinus bradycardia       -asymptomatic       -HR 56-62                Chronic pain       -on  methadone                Gout       -stable, Allopurinol                 Bladder cancer       -s/p surgery in April 2018       -stable                Hypothyroidism       -synthroid        -euthyroid, TSH WNL               Hx of hemorrhagic stroke with left sided hemiparesis        -patient uses wheelchair and walker.  But unable to do so now due to non weight bearing on the left as per podiatry         -continue supportive care               Chronic opiate dependence       - Methadone resumed as per patient's request                       Code status: Full Code       DVT prophylaxis: SCD               Care Plan discussed with: Patient/Family and Nurse        FARIDEH POLANCO, working on the placement       Disposition: TBD           Cellulitis - Care Day 9 (10/6/2018) by Ivonne Louise, RN        Review Status Review Entered       Completed 10/7/2018       Details              Care Day: 9 Care Date: 10/6/2018 Level of Care: Inpatient Floor       Guideline Day 3        Clinical Status       (X) * Hemodynamic stability       10/7/2018 2:52 PM EDT by Emiliana Olmedo         VS: 98.8, 63, 16, 144/81, 95%              (X) * Fever absent or improved       (X) * Skin exam stable or improved       (X) * Mental status at baseline       ( ) * Antibiotic treatment needs appropriate for next level of care       ( ) * Pain absent or manageable at next level of care       ( ) * Discharge plans and education understood              Activity       (X) * Ambulatory              Routes       (X) * Oral hydration, medications,[E]and diet       10/7/2018 2:52 PM EDT by Emiliana Olmedo         dilaudid 1mg IV q3hrs PRN x 7, Percocet 5/325mg 1 tab po q4hrs PRN x6, colchicine 0.6mg po qd, lisinopril 20mg po qd, synthroid 88mcg po qd                     Interventions       (X) WBC       10/7/2018 2:52 PM EDT by Emiliana Olmedo         wbc 9.9                     Medications       (X) Parenteral or oral antibiotics[A]       10/7/2018 2:52 PM EDT by Emiliana Olmedo         rocephin 1g IV q24hrs, Vancomycin 1750mg IV q2hrs                                          * Milestone              Additional Notes       10/6/18               Plan:       Gout       -stable, Allopurinol                 Bladder cancer       -s/p surgery in April 2018       -stable                Hypothyroidism       -synthroid        -euthyroid, TSH WNL               Hx of hemorrhagic stroke with left sided hemiparesis        -patient uses wheelchair and walker.  But unable to do so now due to non weight bearing on the left as per podiatry         -continue supportive care               Chronic opiate dependence       -holding Methadone due to dilaudid and oxycodone use in acute pain setting                       Code status: Full Code       DVT prophylaxis: SCD               Care Plan discussed with: Patient/Family and Nurse        FARIDEH POLANCO, working on the placement       Disposition: TBD           Cellulitis - Care Day 8 (10/5/2018) by Gigi Holt RN        Review Status Review Entered       Completed 10/5/2018       Details              Care Day: 8 Care Date: 10/5/2018 Level of Care: Inpatient Floor       Guideline Day 3        Clinical Status       (X) * Hemodynamic stability       (X) * Fever absent or improved       (X) * Skin exam stable or improved       (X) * Mental status at baseline       ( ) * Antibiotic treatment needs appropriate for next level of care       ( ) * Pain absent or manageable at next level of care       ( ) * Discharge plans and education understood              Activity       (X) * Ambulatory              Routes       (X) * Oral hydration, medications,[E]and diet              Interventions       (X) WBC              Medications       (X) Parenteral or oral antibiotics[A]                                   * Milestone              Additional Notes       Patient seen resting quiet and comfortably and no apparent distress.  Pain on scale of 1-10 is an 10. He relates that he has a lot of pain today.       Status post: left heel and foot ulcer debridements with Grafix application       T 33.3 P 54 RR 16 /100 spO2 96%       Plan/Recommendations/Medical Decision Making:               Continue present treatment               Activity: elevate foot               Discontinue: n/a               Diet: regular               Education: none        Continue elevation of foot; PWB with surgical shoe       PT restarted. PWB to left foot with surgical shoe       Graft in place for one week from application. Changed outer layer.       Continue IV ABX.  Cultures pending.  Appreciate ID input       Smoking cessation with Chantix.       May need ABX and wound care at SNF after DC.       Rocephin IV daily, Dilaudid IV x3, Vancomycin IV q12h

## 2018-10-10 ENCOUNTER — ANESTHESIA EVENT (OUTPATIENT)
Dept: SURGERY | Age: 53
DRG: 364 | End: 2018-10-10
Payer: MEDICAID

## 2018-10-10 ENCOUNTER — ANESTHESIA (OUTPATIENT)
Dept: SURGERY | Age: 53
DRG: 364 | End: 2018-10-10
Payer: MEDICAID

## 2018-10-10 LAB
ANION GAP SERPL CALC-SCNC: 7 MMOL/L (ref 5–15)
BUN SERPL-MCNC: 13 MG/DL (ref 6–20)
BUN/CREAT SERPL: 18 (ref 12–20)
CALCIUM SERPL-MCNC: 8.5 MG/DL (ref 8.5–10.1)
CHLORIDE SERPL-SCNC: 105 MMOL/L (ref 97–108)
CO2 SERPL-SCNC: 26 MMOL/L (ref 21–32)
CREAT SERPL-MCNC: 0.72 MG/DL (ref 0.7–1.3)
GLUCOSE SERPL-MCNC: 93 MG/DL (ref 65–100)
POTASSIUM SERPL-SCNC: 4.4 MMOL/L (ref 3.5–5.1)
SODIUM SERPL-SCNC: 138 MMOL/L (ref 136–145)

## 2018-10-10 PROCEDURE — 74011250637 HC RX REV CODE- 250/637: Performed by: INTERNAL MEDICINE

## 2018-10-10 PROCEDURE — 77030028224 HC PDNG CST BSNM -A: Performed by: PODIATRIST

## 2018-10-10 PROCEDURE — 76210000006 HC OR PH I REC 0.5 TO 1 HR: Performed by: PODIATRIST

## 2018-10-10 PROCEDURE — 76060000032 HC ANESTHESIA 0.5 TO 1 HR: Performed by: PODIATRIST

## 2018-10-10 PROCEDURE — 36415 COLL VENOUS BLD VENIPUNCTURE: CPT | Performed by: INTERNAL MEDICINE

## 2018-10-10 PROCEDURE — 77030011640 HC PAD GRND REM COVD -A: Performed by: PODIATRIST

## 2018-10-10 PROCEDURE — 74011250637 HC RX REV CODE- 250/637: Performed by: HOSPITALIST

## 2018-10-10 PROCEDURE — 77030009526 HC GEL CARSYN MDII -A: Performed by: PODIATRIST

## 2018-10-10 PROCEDURE — 74011250636 HC RX REV CODE- 250/636: Performed by: ANESTHESIOLOGY

## 2018-10-10 PROCEDURE — 74011250636 HC RX REV CODE- 250/636: Performed by: INTERNAL MEDICINE

## 2018-10-10 PROCEDURE — 80048 BASIC METABOLIC PNL TOTAL CA: CPT | Performed by: INTERNAL MEDICINE

## 2018-10-10 PROCEDURE — 74011250636 HC RX REV CODE- 250/636

## 2018-10-10 PROCEDURE — 65270000029 HC RM PRIVATE

## 2018-10-10 PROCEDURE — 74011000250 HC RX REV CODE- 250

## 2018-10-10 PROCEDURE — 76010000154 HC OR TIME FIRST 0.5 HR: Performed by: PODIATRIST

## 2018-10-10 PROCEDURE — 74011000258 HC RX REV CODE- 258: Performed by: INTERNAL MEDICINE

## 2018-10-10 PROCEDURE — 74011250636 HC RX REV CODE- 250/636: Performed by: HOSPITALIST

## 2018-10-10 PROCEDURE — 77030031139 HC SUT VCRL2 J&J -A: Performed by: PODIATRIST

## 2018-10-10 PROCEDURE — 0JRR0KZ REPLACEMENT OF LEFT FOOT SUBCUTANEOUS TISSUE AND FASCIA WITH NONAUTOLOGOUS TISSUE SUBSTITUTE, OPEN APPROACH: ICD-10-PCS | Performed by: PODIATRIST

## 2018-10-10 PROCEDURE — 77030018836 HC SOL IRR NACL ICUM -A: Performed by: PODIATRIST

## 2018-10-10 RX ORDER — ONDANSETRON 2 MG/ML
INJECTION INTRAMUSCULAR; INTRAVENOUS AS NEEDED
Status: DISCONTINUED | OUTPATIENT
Start: 2018-10-10 | End: 2018-10-10 | Stop reason: HOSPADM

## 2018-10-10 RX ORDER — ONDANSETRON 2 MG/ML
4 INJECTION INTRAMUSCULAR; INTRAVENOUS AS NEEDED
Status: DISCONTINUED | OUTPATIENT
Start: 2018-10-10 | End: 2018-10-10 | Stop reason: HOSPADM

## 2018-10-10 RX ORDER — SODIUM CHLORIDE, SODIUM LACTATE, POTASSIUM CHLORIDE, CALCIUM CHLORIDE 600; 310; 30; 20 MG/100ML; MG/100ML; MG/100ML; MG/100ML
125 INJECTION, SOLUTION INTRAVENOUS CONTINUOUS
Status: DISCONTINUED | OUTPATIENT
Start: 2018-10-10 | End: 2018-10-10 | Stop reason: HOSPADM

## 2018-10-10 RX ORDER — LIDOCAINE HYDROCHLORIDE 10 MG/ML
0.1 INJECTION, SOLUTION EPIDURAL; INFILTRATION; INTRACAUDAL; PERINEURAL AS NEEDED
Status: DISCONTINUED | OUTPATIENT
Start: 2018-10-10 | End: 2018-10-10 | Stop reason: HOSPADM

## 2018-10-10 RX ORDER — SODIUM CHLORIDE 9 MG/ML
1000 INJECTION, SOLUTION INTRAVENOUS CONTINUOUS
Status: DISCONTINUED | OUTPATIENT
Start: 2018-10-10 | End: 2018-10-10 | Stop reason: HOSPADM

## 2018-10-10 RX ORDER — DEXMEDETOMIDINE HYDROCHLORIDE 4 UG/ML
INJECTION, SOLUTION INTRAVENOUS AS NEEDED
Status: DISCONTINUED | OUTPATIENT
Start: 2018-10-10 | End: 2018-10-10 | Stop reason: HOSPADM

## 2018-10-10 RX ORDER — OXYCODONE AND ACETAMINOPHEN 5; 325 MG/1; MG/1
1 TABLET ORAL AS NEEDED
Status: DISCONTINUED | OUTPATIENT
Start: 2018-10-10 | End: 2018-10-10 | Stop reason: HOSPADM

## 2018-10-10 RX ORDER — SODIUM CHLORIDE 9 MG/ML
50 INJECTION, SOLUTION INTRAVENOUS CONTINUOUS
Status: DISCONTINUED | OUTPATIENT
Start: 2018-10-10 | End: 2018-10-10 | Stop reason: HOSPADM

## 2018-10-10 RX ORDER — PROPOFOL 10 MG/ML
INJECTION, EMULSION INTRAVENOUS
Status: DISCONTINUED | OUTPATIENT
Start: 2018-10-10 | End: 2018-10-10 | Stop reason: HOSPADM

## 2018-10-10 RX ORDER — MIDAZOLAM HYDROCHLORIDE 1 MG/ML
1 INJECTION, SOLUTION INTRAMUSCULAR; INTRAVENOUS AS NEEDED
Status: DISCONTINUED | OUTPATIENT
Start: 2018-10-10 | End: 2018-10-10 | Stop reason: HOSPADM

## 2018-10-10 RX ORDER — FENTANYL CITRATE 50 UG/ML
50 INJECTION, SOLUTION INTRAMUSCULAR; INTRAVENOUS AS NEEDED
Status: DISCONTINUED | OUTPATIENT
Start: 2018-10-10 | End: 2018-10-10 | Stop reason: HOSPADM

## 2018-10-10 RX ORDER — SODIUM CHLORIDE 0.9 % (FLUSH) 0.9 %
5-10 SYRINGE (ML) INJECTION AS NEEDED
Status: DISCONTINUED | OUTPATIENT
Start: 2018-10-10 | End: 2018-10-10 | Stop reason: HOSPADM

## 2018-10-10 RX ORDER — SODIUM CHLORIDE 0.9 % (FLUSH) 0.9 %
5-10 SYRINGE (ML) INJECTION EVERY 8 HOURS
Status: DISCONTINUED | OUTPATIENT
Start: 2018-10-10 | End: 2018-10-10 | Stop reason: HOSPADM

## 2018-10-10 RX ORDER — FENTANYL CITRATE 50 UG/ML
INJECTION, SOLUTION INTRAMUSCULAR; INTRAVENOUS AS NEEDED
Status: DISCONTINUED | OUTPATIENT
Start: 2018-10-10 | End: 2018-10-10 | Stop reason: HOSPADM

## 2018-10-10 RX ORDER — SODIUM CHLORIDE, SODIUM LACTATE, POTASSIUM CHLORIDE, CALCIUM CHLORIDE 600; 310; 30; 20 MG/100ML; MG/100ML; MG/100ML; MG/100ML
INJECTION, SOLUTION INTRAVENOUS
Status: DISCONTINUED | OUTPATIENT
Start: 2018-10-10 | End: 2018-10-10 | Stop reason: HOSPADM

## 2018-10-10 RX ORDER — MIDAZOLAM HYDROCHLORIDE 1 MG/ML
INJECTION, SOLUTION INTRAMUSCULAR; INTRAVENOUS AS NEEDED
Status: DISCONTINUED | OUTPATIENT
Start: 2018-10-10 | End: 2018-10-10 | Stop reason: HOSPADM

## 2018-10-10 RX ORDER — FENTANYL CITRATE 50 UG/ML
25 INJECTION, SOLUTION INTRAMUSCULAR; INTRAVENOUS
Status: DISCONTINUED | OUTPATIENT
Start: 2018-10-10 | End: 2018-10-10 | Stop reason: HOSPADM

## 2018-10-10 RX ORDER — DIPHENHYDRAMINE HYDROCHLORIDE 50 MG/ML
12.5 INJECTION, SOLUTION INTRAMUSCULAR; INTRAVENOUS AS NEEDED
Status: DISCONTINUED | OUTPATIENT
Start: 2018-10-10 | End: 2018-10-10 | Stop reason: HOSPADM

## 2018-10-10 RX ORDER — MIDAZOLAM HYDROCHLORIDE 1 MG/ML
0.5 INJECTION, SOLUTION INTRAMUSCULAR; INTRAVENOUS
Status: DISCONTINUED | OUTPATIENT
Start: 2018-10-10 | End: 2018-10-10 | Stop reason: HOSPADM

## 2018-10-10 RX ORDER — PROPOFOL 10 MG/ML
INJECTION, EMULSION INTRAVENOUS AS NEEDED
Status: DISCONTINUED | OUTPATIENT
Start: 2018-10-10 | End: 2018-10-10 | Stop reason: HOSPADM

## 2018-10-10 RX ADMIN — DEXMEDETOMIDINE HYDROCHLORIDE 20 MCG: 4 INJECTION, SOLUTION INTRAVENOUS at 10:55

## 2018-10-10 RX ADMIN — IBUPROFEN 600 MG: 600 TABLET ORAL at 23:41

## 2018-10-10 RX ADMIN — FENTANYL CITRATE 25 MCG: 50 INJECTION, SOLUTION INTRAMUSCULAR; INTRAVENOUS at 11:03

## 2018-10-10 RX ADMIN — LISINOPRIL 20 MG: 20 TABLET ORAL at 09:06

## 2018-10-10 RX ADMIN — Medication 10 ML: at 04:56

## 2018-10-10 RX ADMIN — SENNOSIDES AND DOCUSATE SODIUM 1 TABLET: 8.6; 5 TABLET ORAL at 08:58

## 2018-10-10 RX ADMIN — PROPOFOL 50 MG: 10 INJECTION, EMULSION INTRAVENOUS at 10:52

## 2018-10-10 RX ADMIN — FENTANYL CITRATE 25 MCG: 50 INJECTION, SOLUTION INTRAMUSCULAR; INTRAVENOUS at 11:01

## 2018-10-10 RX ADMIN — FENTANYL CITRATE 25 MCG: 50 INJECTION, SOLUTION INTRAMUSCULAR; INTRAVENOUS at 10:52

## 2018-10-10 RX ADMIN — Medication 10 ML: at 13:01

## 2018-10-10 RX ADMIN — OXYCODONE HYDROCHLORIDE AND ACETAMINOPHEN 1 TABLET: 5; 325 TABLET ORAL at 13:00

## 2018-10-10 RX ADMIN — Medication 10 ML: at 21:56

## 2018-10-10 RX ADMIN — COLCHICINE 0.6 MG: 0.6 TABLET, FILM COATED ORAL at 09:06

## 2018-10-10 RX ADMIN — METHADONE HYDROCHLORIDE 115 MG: 5 SOLUTION ORAL at 08:53

## 2018-10-10 RX ADMIN — SODIUM CHLORIDE, SODIUM LACTATE, POTASSIUM CHLORIDE, AND CALCIUM CHLORIDE 125 ML/HR: 600; 310; 30; 20 INJECTION, SOLUTION INTRAVENOUS at 10:15

## 2018-10-10 RX ADMIN — OXYCODONE HYDROCHLORIDE AND ACETAMINOPHEN 1 TABLET: 5; 325 TABLET ORAL at 21:55

## 2018-10-10 RX ADMIN — FENTANYL CITRATE 25 MCG: 50 INJECTION, SOLUTION INTRAMUSCULAR; INTRAVENOUS at 11:06

## 2018-10-10 RX ADMIN — LEVOTHYROXINE SODIUM 88 MCG: 88 TABLET ORAL at 07:10

## 2018-10-10 RX ADMIN — OXYCODONE HYDROCHLORIDE AND ACETAMINOPHEN 1 TABLET: 5; 325 TABLET ORAL at 02:18

## 2018-10-10 RX ADMIN — ZOLPIDEM TARTRATE 5 MG: 5 TABLET ORAL at 23:42

## 2018-10-10 RX ADMIN — VANCOMYCIN HYDROCHLORIDE 1750 MG: 10 INJECTION, POWDER, LYOPHILIZED, FOR SOLUTION INTRAVENOUS at 16:29

## 2018-10-10 RX ADMIN — PROPOFOL 50 MCG/KG/MIN: 10 INJECTION, EMULSION INTRAVENOUS at 10:54

## 2018-10-10 RX ADMIN — VANCOMYCIN HYDROCHLORIDE 1750 MG: 10 INJECTION, POWDER, LYOPHILIZED, FOR SOLUTION INTRAVENOUS at 04:56

## 2018-10-10 RX ADMIN — ONDANSETRON 4 MG: 2 INJECTION INTRAMUSCULAR; INTRAVENOUS at 11:12

## 2018-10-10 RX ADMIN — SODIUM CHLORIDE, SODIUM LACTATE, POTASSIUM CHLORIDE, CALCIUM CHLORIDE: 600; 310; 30; 20 INJECTION, SOLUTION INTRAVENOUS at 10:48

## 2018-10-10 RX ADMIN — TAMSULOSIN HYDROCHLORIDE 0.4 MG: 0.4 CAPSULE ORAL at 08:58

## 2018-10-10 RX ADMIN — MIDAZOLAM HYDROCHLORIDE 2 MG: 1 INJECTION, SOLUTION INTRAMUSCULAR; INTRAVENOUS at 10:52

## 2018-10-10 RX ADMIN — OXYCODONE HYDROCHLORIDE AND ACETAMINOPHEN 1 TABLET: 5; 325 TABLET ORAL at 16:42

## 2018-10-10 RX ADMIN — CEFTRIAXONE 1 G: 1 INJECTION, POWDER, FOR SOLUTION INTRAMUSCULAR; INTRAVENOUS at 19:25

## 2018-10-10 RX ADMIN — OXYCODONE HYDROCHLORIDE AND ACETAMINOPHEN 1 TABLET: 5; 325 TABLET ORAL at 07:10

## 2018-10-10 RX ADMIN — IBUPROFEN 600 MG: 600 TABLET ORAL at 17:46

## 2018-10-10 NOTE — BRIEF OP NOTE
BRIEF OPERATIVE NOTE    Date of Procedure: 10/10/2018   Preoperative Diagnosis: ULCER LEFT HEEL   Postoperative Diagnosis: ULCER LEFT HEEL     Procedure(s):   APPLICATION GRAFIX LEFT HEEL AND FOOT   Surgeon(s) and Role:     * Kimmie Rivers DPM - Primary         Surgical Assistant: none    Surgical Staff:  Circ-1: Ciro Valle  Scrub Tech-1: Jhony Quinteros  Scrub Tech-2: Annette Lopez  Scrub RN-1: Caden Thompson RN  Event Time In   Incision Start 1102   Incision Close 1115     Anesthesia: MAC   Estimated Blood Loss: none  Specimens: * No specimens in log *   Findings: chronic left heel and foot ulcers   Complications: none    Implants:   Implant Name Type Inv.  Item Serial No.  Lot No. LRB No. Used Action   GRAFT MEMBRN CRYO PRME [id number] -- GRAFIX PRIME - [id number]   GRAFT MEMBRN CRYO PRME [id number] -- GRAFIX PRIME [address] [id number] Left 1 Implanted

## 2018-10-10 NOTE — PERIOP NOTES
TRANSFER - OUT REPORT:    Verbal report given to 5 E RN Elen(name) on Gustavo Obando  being transferred to [facility](unit) for routine post - op       Report consisted of patients Situation, Background, Assessment and   Recommendations(SBAR). Time Pre op antibiotic given:n/a  Anesthesia Stop time: 7244  Gross Present on Transfer to floor:n  Order for Gross on Chart:n  Discharge Prescriptions with Chart:n    Information from the following report(s) SBAR, OR Summary, Intake/Output, MAR and Cardiac Rhythm sinus margaux was reviewed with the receiving nurse. Opportunity for questions and clarification was provided. Is the patient on 02? NO       L/Min        Other     Is the patient on a monitor? NO    Is the nurse transporting with the patient? NO    Surgical Waiting Area notified of patient's transfer from PACU?  YES, via volunteer Maynor      The following personal items collected during your admission accompanied patient upon transfer:   Dental Appliance: Dental Appliances: None  Vision: Visual Aid: None  Hearing Aid:    Jewelry:    Clothing: Clothing:  (no personal belongings in PACU)  Other Valuables:    Valuables sent to safe:

## 2018-10-10 NOTE — OP NOTES
[address]   OPERATIVE REPORT    Nakul Rivas  MR#: [id number]  : 1965  ACCOUNT #: [de-identified]   DATE OF SERVICE: 10/10/2018    PREOPERATIVE DIAGNOSIS:  Chronic left heel and foot venous insufficiency ulcers. POSTOPERATIVE DIAGNOSIS:  Chronic left heel and foot venous insufficiency ulcers. PROCEDURE PERFORMED:  Application of Grafix placental membrane graft to left heel and left foot venous insufficiency ulcers. SURGEON:  Tien Cabrera DPM    ASSISTANT: none    PATHOLOGY:  None. ANESTHESIA:  MAC. HEMOSTASIS:  None. SPECIMENS REMOVED: none    ESTIMATED BLOOD LOSS:  None. MATERIALS:  5 x 5 cm Grafix placental membrane graft split in half, Adaptic, Steri-Strips. INTRAOPERATIVE INJECTABLES:  None. COMPLICATIONS:  None. IMPLANTS:  none    DESCRIPTION OF PROCEDURE:  After the patient was properly identified and the correct extremity marked, the patient was taken from the preoperative holding area and taken to the operating room and placed on the operating table in the normal supine fashion and fastened with a lap belt. Was then transferred into the operating room and identified the patient. Once MAC was initiated, the patient's left lower extremity dressing was removed revealing Steri-Strips and Adaptic over a previously placed Grafix placental membrane graft over the left heel wound as well as the dorsal left foot wound, both of which were venous insufficiency in nature. There are no signs of infection and both wounds look markedly improved from the previous presentation  prior to applying the last graft last week. After the dressing and the Adaptic and Steri-Strips were removed, the Grafix placental membrane graft was obtained from the back table after soaking in a saline solution. It was cut in half. Half of the graft was placed over the left heel. Half of the graft was placed over the left forefoot.  A piece of Adaptic was applied followed by multiple Steri-Strips to adhere the graft down to the skin and a bolster dressing dry sterile dressing and cast padding was applied to the patient's left lower extremity followed by application of multiple Ace bandages and a Coban wrap. The patient tolerated the procedure well with vital signs stable and vascular status intact to his left lower extremity. After spending ample time in the PACU, the patient was transferred back to the floors for further medical management and IV antibiotic therapy in hopes for possible discharge/transfer to a skilled nursing facility in the coming days.       Sallye Life, DPM       STV / VN  D: 10/10/2018 11:35     T: 10/10/2018 14:01  JOB #: [id number]  CC: Mehul Walters MD

## 2018-10-10 NOTE — ROUTINE PROCESS
Orders in for ECG on patient. Respiratory at bedside for ECG and patient is refusing for it to be done.

## 2018-10-10 NOTE — PROGRESS NOTES
Physical Therapy    Reviewed chart. Pt is currently off the floor in OR. Deferred visit. Will continue to follow.

## 2018-10-10 NOTE — PROGRESS NOTES
Hospitalist Progress Note  Diamante Morton MD  Answering service: [phone] from in house phone      Date of Service:  10/10/2018  NAME:  Jojo Castillo  :  1965  MRN:  [id number]      Admission Summary:   48 yo man with h/o stroke, HTN, chronic pain, chronic left foot ulcer presented to the ED from home on 18 with worsening of his ulcers and yellowish drainage. He reports an ulcer on the dorsum of his left foot that is reportedly the result of trauma that has been present for about a year, and a few weeks ago developed another ulcer on the medial aspect of the foot. He reports following in a wound care clinic and recently being prescribed ciprofloxacin and clindamycin which did not help. Interval history / Subjective:   Still c/o 8/10 pain in the left foot; s/p 2nd Grafix procedure this morning; had BM yesterday; d/w nurse and CM     Assessment & Plan:     Chronic left heel infected ulcer (POA) - s/p left foot wound and left heel debridement and Grafix application 81/0 by Dr Rena Myers  -?traumatic, appears mildly infected with surrounding cellulitic changes at medial side of left foot and dorsal side anteriorly foot  - no improvement with clindamycin and ciprofloxacin PO outpatient  - wound Cx 46/7 diphtheroids, Alicaligenes  - currently on vanc and ceftriaxone  - XR right foot  soft tissue swelling along the dorsum of the foot  - wound care consulted  - Podiatrist following: PWB, with surgical shoe, foot elevation; dressing changes  - s/p 2nd Grafix application 61/29  - ID following  - pain control; difficult as pt on a number of opiates     Per previous hospitalist, \"patient is belligerent, because his methadone fell off, and he requests to be back on methadone so we will stop Dilaudid IV as there is risk of adverse effects from opiates. \"     Nonadherence to medical treatment - pt is non-compliant with the PWB, refuses to participate in PT, stating that he cannot use his left foot in PWB fashion due to pain although he was going back and forth from the bed to bathroom without the surgical shoe  - care and instruction reinforced  - d/w nursing staff   - patient advised to ring bell to have help going to bathroom and back      HTN - continue lisinopril     Sinus bradycardia - asymptomatic  - refused ECG ordered 10/9pm      Chronic pain - on  methadone  - need to confirm outpatient methadone presciber      Gout - controlled with allopurinol       H/o bladder cancer s/p surgery 4/2018 - stable      Hypothyroidism - TSH WNL; continue levothyroxine     H/o hemorrhagic stroke with left sided hemiparesis   - patient uses wheelchair and walker but unable to do so now due to [facility] status on left as per Podiatry       Chronic opiate dependence - methadone resumed as per patient's request    Constipation - bowel regimen     Code status: Full Code  DVT prophylaxis: SCD     Care Plan discussed with: Patient/Family, Nurse and   Disposition: TBD d/w CM. CM reaching out to all SNFs and ARFs in the area     Hospital Problems  Date Reviewed: 10/1/2018          Codes Class Noted POA    * (Principal)Foot ulcer ([facility] [address] ICD-10-CM: L97.509  ICD-9-CM: 707.15  9/28/2018 Unknown        Stroke ([facility] [address] ICD-10-CM: I63.9  ICD-9-CM: 434.91  3/11/2011 Yes        Hypertension ICD-10-CM: I10  ICD-9-CM: 401.9  3/11/2011 Yes                Review of Systems:   Pertinent items are noted in HPI. Vital Signs:    Last 24hrs VS reviewed since prior progress note.  Most recent are:  Visit Vitals    /79 (BP 1 Location: Right arm, BP Patient Position: Sitting)    Pulse 91    Temp 97.4 °F (36.3 °C)    Resp 18    Ht 6' 1\" (1.854 m)    Wt 103.4 kg (228 lb)    SpO2 97%    BMI 30.08 kg/m2         Intake/Output Summary (Last 24 hours) at 10/10/18 1540  Last data filed at 10/10/18 1155   Gross per 24 hour   Intake             1220 ml   Output             1300 ml   Net              -80 ml        Physical Examination:             Constitutional:  awake, no acute distress, cooperative, pleasant    ENT:  oral mucosa moist, oropharynx benign    Resp:  CTA bilaterally, no wheezing/rhonchi/rales   CV:  regular rhythm, normal rate, no m/r/g appreciated    GI:  +BS, soft, non distended, non tender     Musculoskeletal:  BURNS except LUE and left foot    Neurologic:  AAOx3, left hemiparesis     Skin:  left foot dressing c/d/i in shoe       Data Review:    Review and/or order of clinical lab test  Review and/or order of tests in the radiology section of CPT  Review and/or order of tests in the medicine section of CPT      Labs:     Recent Labs      10/09/18   0500  10/08/18   0426   WBC  9.9  9.4   HGB  12.0*  11.9*   HCT  37.2  36.9   PLT  223  217     Recent Labs      10/10/18   0450  10/09/18   0500  10/08/18   0426   NA  138  137  139   K  4.4  4.4  4.4   CL  105  102  102   CO2  26  27  28   BUN  13  15  15   CREA  0.72  0.75  0.74   GLU  93  115*  119*   CA  8.5  8.7  8.8     No results for input(s): SGOT, GPT, ALT, AP, TBIL, TBILI, TP, ALB, GLOB, GGT, AML, LPSE in the last 72 hours. No lab exists for component: AMYP, HLPSE  No results for input(s): INR, PTP, APTT in the last 72 hours. No lab exists for component: INREXT, INREXT   No results for input(s): FE, TIBC, PSAT, FERR in the last 72 hours. Lab Results   Component Value Date/Time    Folate 8.6 12/13/2012 03:20 AM      No results for input(s): PH, PCO2, PO2 in the last 72 hours. No results for input(s): CPK, CKNDX, TROIQ in the last 72 hours.     No lab exists for component: CPKMB  Lab Results   Component Value Date/Time    Cholesterol, total 152 12/13/2012 03:30 AM    HDL Cholesterol 26 12/13/2012 03:30 AM    LDL, calculated 92.2 12/13/2012 03:30 AM    Triglyceride 169 (H) 12/13/2012 03:30 AM    CHOL/HDL Ratio 5.8 (H) 12/13/2012 03:30 AM     Lab Results   Component Value Date/Time    Glucose (POC) 150 (H) 03/19/2018 11:45 AM    Glucose (POC) 123 (H) 03/18/2018 08:55 PM    Glucose (POC) 96 03/16/2018 04:58 PM    Glucose (POC) 116 (H) 12/12/2012 12:42 PM    Glucose (POC) 99 12/11/2012 11:27 PM     Lab Results   Component Value Date/Time    Color YELLOW/STRAW 07/31/2018 05:52 PM    Appearance CLEAR 07/31/2018 05:52 PM    Specific gravity 1.006 07/31/2018 05:52 PM    pH (UA) 6.0 07/31/2018 05:52 PM    Protein NEGATIVE  07/31/2018 05:52 PM    Glucose NEGATIVE  07/31/2018 05:52 PM    Ketone NEGATIVE  07/31/2018 05:52 PM    Bilirubin NEGATIVE  07/31/2018 05:52 PM    Urobilinogen 0.2 07/31/2018 05:52 PM    Nitrites NEGATIVE  07/31/2018 05:52 PM    Leukocyte Esterase NEGATIVE  07/31/2018 05:52 PM    Epithelial cells FEW 07/31/2018 05:52 PM    Bacteria NEGATIVE  07/31/2018 05:52 PM    WBC 0-4 07/31/2018 05:52 PM    RBC 0-5 07/31/2018 05:52 PM         Medications Reviewed:     Current Facility-Administered Medications   Medication Dose Route Frequency    senna-docusate (PERICOLACE) 8.6-50 mg per tablet 1 Tab  1 Tab Oral BID    oxyCODONE-acetaminophen (PERCOCET) 5-325 mg per tablet 1 Tab  1 Tab Oral Q4H PRN    ibuprofen (MOTRIN) tablet 600 mg  600 mg Oral Q6H PRN    methadone (DOLOPHINE) 5 mg/5 mL oral solution 115 mg  115 mg Oral DAILY    colchicine tablet 0.6 mg  0.6 mg Oral DAILY PRN    cefTRIAXone (ROCEPHIN) 1 g in 0.9% sodium chloride (MBP/ADV) 50 mL  1 g IntraVENous Q24H    zolpidem (AMBIEN) tablet 5 mg  5 mg Oral QHS PRN    vancomycin (VANCOCIN) 1750 mg in  ml infusion  1,750 mg IntraVENous Q12H    levothyroxine (SYNTHROID) tablet 88 mcg  88 mcg Oral ACB    lisinopril (PRINIVIL, ZESTRIL) tablet 20 mg  20 mg Oral DAILY    tamsulosin (FLOMAX) capsule 0.4 mg  0.4 mg Oral DAILY    sodium chloride (NS) flush 5-10 mL  5-10 mL IntraVENous Q8H    sodium chloride (NS) flush 5-10 mL  5-10 mL IntraVENous PRN    acetaminophen (TYLENOL) tablet 650 mg  650 mg Oral Q4H PRN    ondansetron (ZOFRAN) injection 4 mg  4 mg IntraVENous Q4H PRN    Vancomycin Pharmacy to Dose   Other Rx Dosing/Monitoring     ______________________________________________________________________  EXPECTED LENGTH OF STAY: 4d 2h  ACTUAL LENGTH OF STAY:          John 28, MD

## 2018-10-10 NOTE — PERIOP NOTES
TRANSFER - IN REPORT:    Verbal report received from Elen(name) on Francy Jessica  being received from (unit) for ordered procedure      Report consisted of patients Situation, Background, Assessment and   Recommendations(SBAR). Information from the following report(s) SBAR, Kardex, Procedure Summary, Intake/Output, MAR and Recent Results was reviewed with the receiving nurse. Opportunity for questions and clarification was provided. Assessment completed upon patients arrival to unit and care assumed.

## 2018-10-10 NOTE — PROGRESS NOTES
Consent refused to be signed by patient at this time because he would like to speak with surgeon before surgery. Bedside shift change report given to ST. ADRIANE DYKES (oncoming nurse) by Susan West (offgoing nurse). Report included the following information SBAR, Kardex, Intake/Output, MAR and Recent Results.

## 2018-10-10 NOTE — ANESTHESIA POSTPROCEDURE EVALUATION
Post-Anesthesia Evaluation and Assessment    Patient: Samuel Rodgers MRN: [id number]  SSN: [id number]    YOB: 1965  Age: 47 y.o. Sex: male       Cardiovascular Function/Vital Signs  Visit Vitals    /75    Pulse (!) 53    Temp 36.5 °C (97.7 °F)    Resp 10    Ht 6' 1\" (1.854 m)    Wt 103.4 kg (228 lb)    SpO2 99%    BMI 30.08 kg/m2       Patient is status post MAC anesthesia for Procedure(s):   APPLICATION GRAFIX LEFT HEEL AND FOOT . Nausea/Vomiting: None    Postoperative hydration reviewed and adequate. Pain:  Pain Scale 1: Numeric (0 - 10) (10/10/18 1006)  Pain Intensity 1: 9 (10/10/18 1006)   Managed    Neurological Status:   Neuro (WDL):  (very drowsy post anesthesia) (10/10/18 1125)  Neuro  Neurologic State: Alert; Appropriate for age (10/09/18 2007)  Orientation Level: Oriented X4 (10/09/18 2007)  Cognition: Follows commands; Appropriate decision making; Appropriate for age attention/concentration; Appropriate safety awareness (10/09/18 2007)  LUE Motor Response: Flaccid (10/09/18 2007)  LLE Motor Response: Weak (10/09/18 2007)  RUE Motor Response: Purposeful (10/09/18 2007)  RLE Motor Response: Purposeful (10/09/18 2007)   At baseline    Mental Status and Level of Consciousness: Arousable    Pulmonary Status:   O2 Device: Nasal cannula (10/10/18 1125)   Adequate oxygenation and airway patent    Complications related to anesthesia: None    Post-anesthesia assessment completed.  No concerns    Signed By: Alyssa Mensah MD     October 10, 2018

## 2018-10-10 NOTE — PROGRESS NOTES
Reviewed medical chart; patient is having another debridement today. Spoke with the patient's , Omari Brunson, JUAN#[phone] to provide an update again today. Flor Adorno is under the impression that [facility] may be able to accept now; will await a call from the Naren Rodriges tomorrow. Patient has been denied services at the following facilities:  · [place]  · [facility]  · [facility]   · [place]   · [facility]  · [facility]   · [facility]  · [facility]  · [facility]  · [facility]  · [facility]   · [place]   · [place]  · [facility]  · [place] [address]   · [address] [facility]  · [facility]   · [place]   · [facility]  Referrals are pending at the following facilities. Called to ask that admissions staff make a decision regarding acceptance as soon as possible:  · [facility] of [address] from admissions called to explain that they may be able to accept him at discharge. They are a non-smoking facility, however. Spoke to the patient who stated \"My life is more important than a cigarette. I don't need to spoke and I started Chantix 2 months ago. \"  Updated admissions and awaiting final decision  · [facility] may be able to accept him next week. · [facility]'s medical director is reviewing his case    Care Management will continue to follow his disposition.    JUVENCIO Cole

## 2018-10-10 NOTE — PROGRESS NOTES
In OR earlier. He will be able to go home on oral Augmentin when time for discharge--875 mg bid for another 10 days or so.

## 2018-10-10 NOTE — PROGRESS NOTES
Bedside and Verbal shift change report given to Juanjo Reeves RN  (oncoming nurse) by Delories Moritz (offgoing nurse). Report included the following information SBAR and Kardex.

## 2018-10-10 NOTE — ANESTHESIA PREPROCEDURE EVALUATION
Anesthetic History   No history of anesthetic complications            Review of Systems / Medical History  Patient summary reviewed, nursing notes reviewed and pertinent labs reviewed    Pulmonary  Within defined limits  COPD               Neuro/Psych   Within defined limits  seizures    TIA     Cardiovascular  Within defined limits  Hypertension                   GI/Hepatic/Renal  Within defined limits              Endo/Other  Within defined limits    Hypothyroidism       Other Findings              Physical Exam    Airway  Mallampati: II  TM Distance: > 6 cm  Neck ROM: normal range of motion   Mouth opening: Normal     Cardiovascular  Regular rate and rhythm,  S1 and S2 normal,  no murmur, click, rub, or gallop             Dental  No notable dental hx       Pulmonary  Breath sounds clear to auscultation               Abdominal  GI exam deferred       Other Findings            Anesthetic Plan    ASA: 3  Anesthesia type: MAC          Induction: Intravenous  Anesthetic plan and risks discussed with: Patient

## 2018-10-10 NOTE — PROGRESS NOTES
TRANSFER - IN REPORT:    Verbal report received from Addi(keara) on Goldy Gonsalves  being received from PACU(unit) for routine post - op      Report consisted of patients Situation, Background, Assessment and   Recommendations(SBAR). Information from the following report(s) SBAR, OR Summary, Intake/Output, MAR and Recent Results was reviewed with the receiving nurse. Opportunity for questions and clarification was provided. Assessment completed upon patients arrival to unit and care assumed.

## 2018-10-11 ENCOUNTER — APPOINTMENT (OUTPATIENT)
Dept: CT IMAGING | Age: 53
DRG: 364 | End: 2018-10-11
Attending: INTERNAL MEDICINE
Payer: MEDICAID

## 2018-10-11 ENCOUNTER — APPOINTMENT (OUTPATIENT)
Dept: GENERAL RADIOLOGY | Age: 53
DRG: 364 | End: 2018-10-11
Attending: INTERNAL MEDICINE
Payer: MEDICAID

## 2018-10-11 LAB
ANION GAP SERPL CALC-SCNC: 8 MMOL/L (ref 5–15)
BUN SERPL-MCNC: 12 MG/DL (ref 6–20)
BUN/CREAT SERPL: 15 (ref 12–20)
CALCIUM SERPL-MCNC: 8.6 MG/DL (ref 8.5–10.1)
CHLORIDE SERPL-SCNC: 106 MMOL/L (ref 97–108)
CO2 SERPL-SCNC: 27 MMOL/L (ref 21–32)
CREAT SERPL-MCNC: 0.81 MG/DL (ref 0.7–1.3)
ERYTHROCYTE [DISTWIDTH] IN BLOOD BY AUTOMATED COUNT: 15.8 % (ref 11.5–14.5)
GLUCOSE SERPL-MCNC: 124 MG/DL (ref 65–100)
HCT VFR BLD AUTO: 35.2 % (ref 36.6–50.3)
HGB BLD-MCNC: 11.2 G/DL (ref 12.1–17)
MCH RBC QN AUTO: 28.7 PG (ref 26–34)
MCHC RBC AUTO-ENTMCNC: 31.8 G/DL (ref 30–36.5)
MCV RBC AUTO: 90.3 FL (ref 80–99)
NRBC # BLD: 0 K/UL (ref 0–0.01)
NRBC BLD-RTO: 0 PER 100 WBC
PLATELET # BLD AUTO: 216 K/UL (ref 150–400)
PMV BLD AUTO: 11.1 FL (ref 8.9–12.9)
POTASSIUM SERPL-SCNC: 4.3 MMOL/L (ref 3.5–5.1)
RBC # BLD AUTO: 3.9 M/UL (ref 4.1–5.7)
SODIUM SERPL-SCNC: 141 MMOL/L (ref 136–145)
WBC # BLD AUTO: 7.9 K/UL (ref 4.1–11.1)

## 2018-10-11 PROCEDURE — 74011250636 HC RX REV CODE- 250/636: Performed by: INTERNAL MEDICINE

## 2018-10-11 PROCEDURE — 74011250637 HC RX REV CODE- 250/637: Performed by: HOSPITALIST

## 2018-10-11 PROCEDURE — 71045 X-RAY EXAM CHEST 1 VIEW: CPT

## 2018-10-11 PROCEDURE — 97535 SELF CARE MNGMENT TRAINING: CPT

## 2018-10-11 PROCEDURE — 97530 THERAPEUTIC ACTIVITIES: CPT

## 2018-10-11 PROCEDURE — 36415 COLL VENOUS BLD VENIPUNCTURE: CPT | Performed by: INTERNAL MEDICINE

## 2018-10-11 PROCEDURE — 65270000029 HC RM PRIVATE

## 2018-10-11 PROCEDURE — 85027 COMPLETE CBC AUTOMATED: CPT | Performed by: INTERNAL MEDICINE

## 2018-10-11 PROCEDURE — 74011000258 HC RX REV CODE- 258: Performed by: INTERNAL MEDICINE

## 2018-10-11 PROCEDURE — 74011250636 HC RX REV CODE- 250/636: Performed by: HOSPITALIST

## 2018-10-11 PROCEDURE — 74011250637 HC RX REV CODE- 250/637: Performed by: INTERNAL MEDICINE

## 2018-10-11 PROCEDURE — 80048 BASIC METABOLIC PNL TOTAL CA: CPT | Performed by: INTERNAL MEDICINE

## 2018-10-11 RX ORDER — COLCHICINE 0.6 MG/1
0.6 TABLET ORAL DAILY
Status: DISCONTINUED | OUTPATIENT
Start: 2018-10-12 | End: 2018-10-12 | Stop reason: HOSPADM

## 2018-10-11 RX ORDER — SODIUM CHLORIDE 0.9 % (FLUSH) 0.9 %
SYRINGE (ML) INJECTION
Status: COMPLETED
Start: 2018-10-11 | End: 2018-10-11

## 2018-10-11 RX ORDER — SODIUM CHLORIDE 0.9 % (FLUSH) 0.9 %
10 SYRINGE (ML) INJECTION
Status: DISPENSED | OUTPATIENT
Start: 2018-10-11 | End: 2018-10-11

## 2018-10-11 RX ADMIN — OXYCODONE HYDROCHLORIDE AND ACETAMINOPHEN 1 TABLET: 5; 325 TABLET ORAL at 06:12

## 2018-10-11 RX ADMIN — OXYCODONE HYDROCHLORIDE AND ACETAMINOPHEN 1 TABLET: 5; 325 TABLET ORAL at 11:53

## 2018-10-11 RX ADMIN — VANCOMYCIN HYDROCHLORIDE 1750 MG: 10 INJECTION, POWDER, LYOPHILIZED, FOR SOLUTION INTRAVENOUS at 17:53

## 2018-10-11 RX ADMIN — OXYCODONE HYDROCHLORIDE AND ACETAMINOPHEN 1 TABLET: 5; 325 TABLET ORAL at 21:18

## 2018-10-11 RX ADMIN — Medication 10 ML: at 13:33

## 2018-10-11 RX ADMIN — OXYCODONE HYDROCHLORIDE AND ACETAMINOPHEN 1 TABLET: 5; 325 TABLET ORAL at 17:01

## 2018-10-11 RX ADMIN — METHADONE HYDROCHLORIDE 115 MG: 5 SOLUTION ORAL at 09:21

## 2018-10-11 RX ADMIN — ACETAMINOPHEN 650 MG: 325 TABLET ORAL at 03:27

## 2018-10-11 RX ADMIN — OXYCODONE HYDROCHLORIDE AND ACETAMINOPHEN 1 TABLET: 5; 325 TABLET ORAL at 02:07

## 2018-10-11 RX ADMIN — ACETAMINOPHEN 650 MG: 325 TABLET ORAL at 13:37

## 2018-10-11 RX ADMIN — ACETAMINOPHEN 650 MG: 325 TABLET ORAL at 23:22

## 2018-10-11 RX ADMIN — Medication 10 ML: at 17:54

## 2018-10-11 RX ADMIN — Medication 10 ML: at 22:00

## 2018-10-11 RX ADMIN — CEFTRIAXONE 1 G: 1 INJECTION, POWDER, FOR SOLUTION INTRAMUSCULAR; INTRAVENOUS at 21:00

## 2018-10-11 RX ADMIN — LEVOTHYROXINE SODIUM 88 MCG: 88 TABLET ORAL at 08:26

## 2018-10-11 RX ADMIN — SENNOSIDES AND DOCUSATE SODIUM 1 TABLET: 8.6; 5 TABLET ORAL at 08:35

## 2018-10-11 RX ADMIN — VANCOMYCIN HYDROCHLORIDE 1750 MG: 10 INJECTION, POWDER, LYOPHILIZED, FOR SOLUTION INTRAVENOUS at 04:34

## 2018-10-11 RX ADMIN — SENNOSIDES AND DOCUSATE SODIUM 1 TABLET: 8.6; 5 TABLET ORAL at 17:01

## 2018-10-11 RX ADMIN — LISINOPRIL 20 MG: 20 TABLET ORAL at 08:35

## 2018-10-11 RX ADMIN — COLCHICINE 0.6 MG: 0.6 TABLET, FILM COATED ORAL at 09:21

## 2018-10-11 RX ADMIN — TAMSULOSIN HYDROCHLORIDE 0.4 MG: 0.4 CAPSULE ORAL at 08:35

## 2018-10-11 NOTE — PROGRESS NOTES
Bedside shift change report given to Kyra Menjivar RN (oncoming nurse) by EMERY Tilley (offgoing nurse). Report included the following information SBAR, Kardex, MAR and Recent Results.

## 2018-10-11 NOTE — PROGRESS NOTES
Primary Nurse Caryle Hood, RN and Felicita Aschoff, RN performed a dual skin assessment on this patient Impairment noted- see wound doc flow sheet  Dejuan score is 20

## 2018-10-11 NOTE — PROGRESS NOTES
10/11/18, 12:14PM - Patient has been accepted to [address] ([facility]) pending insurance authorization. The insurance authorization process has been started. Care Management will continue to follow his disposition. JUVENCIO Maldonado    10/11/18, 2:24PM - Checked in with [facility] liaison, Rowdy Shanks, JUAN#[phone]. Insurance authorization is still pending. Care Management will continue to follow his disposition.    JUVENCIO Maldonado

## 2018-10-11 NOTE — PROGRESS NOTES
Hospitalist Progress Note  Cornel Fregoso MD  Answering service: [phone] from in house phone      Date of Service:  10/11/2018  NAME:  Eryn Liu  :  1965  MRN:  [id number]      Admission Summary:   48 yo man with h/o stroke, HTN, chronic pain, chronic left foot ulcer presented to the ED from home on 18 with worsening of his ulcers and yellowish drainage. He reports an ulcer on the dorsum of his left foot that is reportedly the result of trauma that has been present for about a year, and a few weeks ago developed another ulcer on the medial aspect of the foot. He reports following in a wound care clinic and recently being prescribed ciprofloxacin and clindamycin which did not help. Interval history / Subjective:   Still c/o 8/10 pain in the left foot, some SOB at times, and now having regular BMs; d/w nurse and CM     Assessment & Plan:     Chronic left heel infected ulcer (POA) - s/p left foot wound and left heel debridement and Grafix application  by Dr Tamra Sanchez  -?traumatic, appears mildly infected with surrounding cellulitic changes at medial side of left foot and dorsal side anteriorly foot  - no improvement with clindamycin and ciprofloxacin PO outpatient  - wound Cx  diphtheroids, Alicaligenes  - currently on vanc and ceftriaxone  - XR right foot  soft tissue swelling along the dorsum of the foot  - wound care consulted  - Podiatrist following: PWB, with surgical shoe, foot elevation; dressing changes  - s/p 2nd Grafix application   - ID following  - pain control; difficult as pt on a number of opiates     Per previous hospitalist, \"patient is belligerent, because his methadone fell off, and he requests to be back on methadone so we will stop Dilaudid IV as there is risk of adverse effects from opiates. \"     Nonadherence to medical treatment - pt is non-compliant with the PWB, refuses to participate in PT, stating that he cannot use his left foot in PWB fashion due to pain although he was going back and forth from the bed to bathroom without the surgical shoe  - care and instruction reinforced  - d/w nursing staff   - patient advised to ring bell to have help going to bathroom and back      HTN - continue lisinopril     Sinus bradycardia - asymptomatic  - refused ECG ordered 10/9pm      Chronic pain - on  methadone  - need to confirm outpatient methadone presciber      Gout - controlled with allopurinol       H/o bladder cancer s/p surgery 4/2018 - stable      Hypothyroidism - TSH WNL; continue levothyroxine     H/o hemorrhagic stroke with left sided hemiparesis   - patient uses wheelchair and walker but unable to do so now due to [facility] status on left as per Podiatry       Chronic opiate dependence - methadone resumed as per patient's request    Constipation - resolved with bowel regimen     Code status: Full Code  DVT prophylaxis: SCD     Care Plan discussed with: Patient/Family, Nurse and   Disposition: TBD. Possible dc to [facility] rehab pending [facility] Problems  Date Reviewed: 10/1/2018          Codes Class Noted POA    * (Principal)Foot ulcer ([facility] ICD-10-CM: L97.509  ICD-9-CM: 707.15  9/28/2018 Unknown        Stroke ([facility] [address] ICD-10-CM: I63.9  ICD-9-CM: 434.91  3/11/2011 Yes        Hypertension ICD-10-CM: I10  ICD-9-CM: 401.9  3/11/2011 Yes                Review of Systems:   Pertinent items are noted in HPI. Vital Signs:    Last 24hrs VS reviewed since prior progress note.  Most recent are:  Visit Vitals    /71 (BP 1 Location: Right arm, BP Patient Position: Head of bed elevated (Comment degrees))    Pulse 60    Temp 98 °F (36.7 °C)    Resp 18    Ht 6' 1\" (1.854 m)    Wt 103.4 kg (228 lb)    SpO2 93%    BMI 30.08 kg/m2         Intake/Output Summary (Last 24 hours) at 10/11/18 1101  Last data filed at 10/11/18 0815   Gross per 24 hour   Intake              620 ml Output             1055 ml   Net             -435 ml        Physical Examination:             Constitutional:  awake, no acute distress, cooperative, pleasant    ENT:  oral mucosa moist, oropharynx benign    Resp:  CTA bilaterally, no wheezing/rhonchi/rales   CV:  regular rhythm, normal rate, no m/r/g appreciated    GI:  +BS, soft, non distended, non tender     Musculoskeletal:  BURNS except LUE and left foot    Neurologic:  AAOx3, left hemiparesis     Skin:  left foot dressing c/d/i in shoe       Data Review:    Review and/or order of clinical lab test  Review and/or order of tests in the radiology section of CPT  Review and/or order of tests in the medicine section of CPT      Labs:     Recent Labs      10/11/18   0431  10/09/18   0500   WBC  7.9  9.9   HGB  11.2*  12.0*   HCT  35.2*  37.2   PLT  216  223     Recent Labs      10/11/18   0431  10/10/18   0450  10/09/18   0500   NA  141  138  137   K  4.3  4.4  4.4   CL  106  105  102   CO2  27  26  27   BUN  12  13  15   CREA  0.81  0.72  0.75   GLU  124*  93  115*   CA  8.6  8.5  8.7     No results for input(s): SGOT, GPT, ALT, AP, TBIL, TBILI, TP, ALB, GLOB, GGT, AML, LPSE in the last 72 hours. No lab exists for component: AMYP, HLPSE  No results for input(s): INR, PTP, APTT in the last 72 hours. No lab exists for component: INREXT, INREXT   No results for input(s): FE, TIBC, PSAT, FERR in the last 72 hours. Lab Results   Component Value Date/Time    Folate 8.6 12/13/2012 03:20 AM      No results for input(s): PH, PCO2, PO2 in the last 72 hours. No results for input(s): CPK, CKNDX, TROIQ in the last 72 hours.     No lab exists for component: CPKMB  Lab Results   Component Value Date/Time    Cholesterol, total 152 12/13/2012 03:30 AM    HDL Cholesterol 26 12/13/2012 03:30 AM    LDL, calculated 92.2 12/13/2012 03:30 AM    Triglyceride 169 (H) 12/13/2012 03:30 AM    CHOL/HDL Ratio 5.8 (H) 12/13/2012 03:30 AM     Lab Results   Component Value Date/Time Glucose (POC) 150 (H) 03/19/2018 11:45 AM    Glucose (POC) 123 (H) 03/18/2018 08:55 PM    Glucose (POC) 96 03/16/2018 04:58 PM    Glucose (POC) 116 (H) 12/12/2012 12:42 PM    Glucose (POC) 99 12/11/2012 11:27 PM     Lab Results   Component Value Date/Time    Color YELLOW/STRAW 07/31/2018 05:52 PM    Appearance CLEAR 07/31/2018 05:52 PM    Specific gravity 1.006 07/31/2018 05:52 PM    pH (UA) 6.0 07/31/2018 05:52 PM    Protein NEGATIVE  07/31/2018 05:52 PM    Glucose NEGATIVE  07/31/2018 05:52 PM    Ketone NEGATIVE  07/31/2018 05:52 PM    Bilirubin NEGATIVE  07/31/2018 05:52 PM    Urobilinogen 0.2 07/31/2018 05:52 PM    Nitrites NEGATIVE  07/31/2018 05:52 PM    Leukocyte Esterase NEGATIVE  07/31/2018 05:52 PM    Epithelial cells FEW 07/31/2018 05:52 PM    Bacteria NEGATIVE  07/31/2018 05:52 PM    WBC 0-4 07/31/2018 05:52 PM    RBC 0-5 07/31/2018 05:52 PM         Medications Reviewed:     Current Facility-Administered Medications   Medication Dose Route Frequency    sodium chloride 0.9 % bolus infusion 100 mL  100 mL IntraVENous RAD ONCE    sodium chloride (NS) flush 10 mL  10 mL IntraVENous RAD ONCE    iopamidol (ISOVUE 300) 61 % contrast injection 100 mL  100 mL IntraVENous RAD ONCE    [START ON 10/12/2018] colchicine tablet 0.6 mg  0.6 mg Oral DAILY    senna-docusate (PERICOLACE) 8.6-50 mg per tablet 1 Tab  1 Tab Oral BID    oxyCODONE-acetaminophen (PERCOCET) 5-325 mg per tablet 1 Tab  1 Tab Oral Q4H PRN    methadone (DOLOPHINE) 5 mg/5 mL oral solution 115 mg  115 mg Oral DAILY    cefTRIAXone (ROCEPHIN) 1 g in 0.9% sodium chloride (MBP/ADV) 50 mL  1 g IntraVENous Q24H    zolpidem (AMBIEN) tablet 5 mg  5 mg Oral QHS PRN    vancomycin (VANCOCIN) 1750 mg in  ml infusion  1,750 mg IntraVENous Q12H    levothyroxine (SYNTHROID) tablet 88 mcg  88 mcg Oral ACB    lisinopril (PRINIVIL, ZESTRIL) tablet 20 mg  20 mg Oral DAILY    tamsulosin (FLOMAX) capsule 0.4 mg  0.4 mg Oral DAILY    sodium chloride (NS) flush 5-10 mL  5-10 mL IntraVENous Q8H    sodium chloride (NS) flush 5-10 mL  5-10 mL IntraVENous PRN    acetaminophen (TYLENOL) tablet 650 mg  650 mg Oral Q4H PRN    ondansetron (ZOFRAN) injection 4 mg  4 mg IntraVENous Q4H PRN    Vancomycin Pharmacy to Dose   Other Rx Dosing/Monitoring     ______________________________________________________________________  EXPECTED LENGTH OF STAY: 4d 2h  ACTUAL LENGTH OF STAY:          Beka Garrett [address], MD

## 2018-10-11 NOTE — PROGRESS NOTES
0800- CT called to cancel and move patient's CT scan after transport arrived. Patient says he is going to go ahead and eat since CT wants him NPO for 4 hours. Patient stated Roberto Arroyo can just do it tomorrow for all I care. I don't even care about having this CT\"  1330- CT attempted to get patient. Pt  Informed transporter that he had started eating lunch. Patient was asked earlier in the day to not eat so that he may be able to go to CT this afternoon. RN spoke with pt and plan is for him to not eat dinner. Sign on door stating NPO.

## 2018-10-11 NOTE — PROGRESS NOTES
Progress Note    Patient: Luci Traore MRN: [id number]  SSN: [id number]    YOB: 1965  Age: 47 y.o. Sex: male      Admit Date: 9/28/2018  7 Days Post-Op, 1 day post op 2nd application of Grafix    Procedure:   Procedure(s):  LEFT FOOT WOUND AND LEFT HEEL  DEBRIDEMENT AND GRAFIX APPLICATION TO LEFT FOOT AND LEFT HEEL. Subjective:     Patient seen resting quiet and comfortably and no apparent distress. Pain is better controlled. Status post: left heel and foot ulcer debridement and application of Grafix    Objective:     Visit Vitals    /71 (BP 1 Location: Right arm, BP Patient Position: Head of bed elevated (Comment degrees))    Pulse 60    Temp 98 °F (36.7 °C)    Resp 18    Ht 6' 1\" (1.854 m)    Wt 103.4 kg (228 lb)    SpO2 93%    BMI 30.08 kg/m2        Physical Exam:  normal DP and PT pulses and reduced sensation at left foot with dressing and ACE intact    Labs/Radiology Review: images and reports reviewed    Assessment:     Hospital Problems  Date Reviewed: 10/1/2018          Codes Class Noted POA    * (Principal)Foot ulcer ([facility] ICD-10-CM: L97.509  ICD-9-CM: 707.15  9/28/2018 Unknown        Stroke ([facility] ICD-10-CM: I63.9  ICD-9-CM: 434.91  3/11/2011 Yes        Hypertension ICD-10-CM: I10  ICD-9-CM: 401.9  3/11/2011 Yes              Plan/Recommendations/Medical Decision Making:     Continue present treatment    Activity: PWB to left foot with surgical shoe    Discontinue: n/a    Diet: regular    Education: Ulcer care   Continue ABX per ID.   PWB left foot with surgical shoe at PT  OK for DC from my standpoint  Follow up with me at [facility] in one week    Signed By: Lisseth Mccabe DPM     October 11, 2018

## 2018-10-11 NOTE — PROGRESS NOTES
Bedside shift change report given to Treva (oncoming nurse) by June Ching (offgoing nurse). Report included the following information SBAR, Kardex, Intake/Output, MAR and Recent Results.

## 2018-10-11 NOTE — PROGRESS NOTES
Problem: Self Care Deficits Care Plan (Adult)  Goal: *Acute Goals and Plan of Care (Insert Text)  Occupational Therapy Goals  Initiated 10/3/2018; Reviewed 10/11/18 for weekly reassess, continue all  1. Patient will perform lower body dressing with minimal assistance/contact guard assist within 7 day(s) using AE PRN. 2.  Patient will perform grooming with supervision/set-up seated within 7 day(s). 3.  Patient will perform bathing with minimal assistance/contact guard assist within 7 day(s). 4.  Patient will perform toilet transfers with supervision/set-up while adhering to [facility] precaution within 7 day(s). 5.  Patient will perform all aspects of toileting with minimal assistance within 7 day(s) using DME PRN. 6.  Patient will adhere to [facility] precaution using surgical shoe with supervision within 7 day(s). 7.  Patient will utilize energy conservation techniques during functional activities with verbal cues within 7 day(s). Occupational Therapy TREATMENT: WEEKLY REASSESSMENT  Patient: Anca Diego (45 y.o. male)  Date: 10/11/2018  Diagnosis: Foot ulcer ([address]  infected left foot  ULCER LEFT HEEL  Foot ulcer ([address]  Procedure(s) (LRB):   APPLICATION GRAFIX LEFT HEEL AND FOOT  (Left) 1 Day Post-Op  Precautions: Fall, PWB  Chart, occupational therapy assessment, plan of care, and goals were reviewed. ASSESSMENT:  Cleared by RN to see pt for weekly reassessment, no goals met this date. Pt received supine in bed, agreeable to participate. Transferred to sitting EOB with supervision and using leg crossover technique donned surgical shoe with supervision. Reviewed PWB status at length with pt and he verbalized understanding. Pt stood from bedside with supervision using SPC (declined other AD), reporting he needed to use the bathroom. Transferred to toilet with CGA and completed bladder hygiene in sitting with supervision, then ambulated back to bedside.  Max cueing provided for pt to maintain PWB but pt only maintained approx 25% of the time. Returned to sitting EOB and pt doffed surgical shoe with CGA as his R foot initially slipped while bringing LLE to crossover (pt with appropriate footwear donned). Transferred back to supine with supervision. Provided pt with additional foam tubing for skin integrity of L hand and reviewed ROM exercises. Pt in bed at end of session with call bell in reach and needs met. He will benefit from continued OT to work towards the above goals. Recommend rehab at discharge to increase independence and safety. Progression toward goals:  []            Improving appropriately and progressing toward goals  [x]            Improving slowly and progressing toward goals  []            Not making progress toward goals and plan of care will be adjusted     PLAN:  Goals have been updated based on progression since last assessment. Patient continues to benefit from skilled intervention to address the above impairments. Continue to follow patient 3 times a week to address goals. Planned Interventions:  [x]                    Self Care Training                  [x]             Therapeutic Activities  [x]                    Functional Mobility Training    []             Cognitive Retraining  [x]                    Therapeutic Exercises           [x]             Endurance Activities  [x]                    Balance Training                   []             Neuromuscular Re-Education  []                    Visual/Perceptual Training     []        Home Safety Training  [x]                    Patient Education                 [x]             Family Training/Education  []                    Other (comment):  Discharge Recommendations: Rehab  Further Equipment Recommendations for Discharge: TBD at rehab     SUBJECTIVE:   Patient stated I'll try.     OBJECTIVE DATA SUMMARY:   Cognitive/Behavioral Status:  Neurologic State: Alert  Orientation Level: Oriented X4  Cognition: Follows commands;Poor safety awareness  Perception: Appears intact  Perseveration: No perseveration noted  Safety/Judgement: Awareness of environment; Fall prevention;Decreased awareness of need for safety;Decreased awareness of need for assistance    Functional Mobility and Transfers for ADLs:  Bed Mobility:  Supine to Sit: Supervision  Sit to Supine: Supervision    Transfers:  Sit to Stand: Supervision  Functional Transfers  Toilet Transfer : Contact guard assistance        Balance:  Sitting: Intact  Standing: Impaired; With support (SPC)  Standing - Static: Fair  Standing - Dynamic : Fair    ADL Intervention:         Lower Body Dressing Assistance  Shoes with Velcro: Contact guard assistance  Leg Crossed Method Used: Yes  Position Performed: Seated edge of bed  Cues: Verbal cues provided    Toileting  Bladder Hygiene: Supervision/set-up    Cognitive Retraining  Safety/Judgement: Awareness of environment; Fall prevention;Decreased awareness of need for safety;Decreased awareness of need for assistance    Pain:  Pain Scale 1: Visual  Pain Intensity 1: 0           Pain Intervention(s) 1: Medication (see MAR)  Activity Tolerance:   Good  Please refer to the flowsheet for vital signs taken during this treatment.   After treatment:   [] Patient left in no apparent distress sitting up in chair  [x] Patient left in no apparent distress in bed  [x] Call bell left within reach  [x] Nursing notified  [] Caregiver present  [] Bed alarm activated    COMMUNICATION/COLLABORATION:   The patients plan of care was discussed with: Physical Therapist and Registered Nurse    Kavon Garay OT  Time Calculation: 23 mins

## 2018-10-11 NOTE — DISCHARGE INSTRUCTIONS
ADDITIONAL CARE RECOMMENDATIONS:   1. Take medications as prescribed. 2. Keep appointment(s) as recommended/scheduled. 3. Patient on chronic methadone. 4. Take ibuprofen on full stomach. 5. Please follow up with your Urologist about the CT urogram.    DIET: heart healthy diet    ACTIVITY: See surgical instructions; PWB left foot with surgical shoe    WOUND CARE: see surgical instructions    EQUIPMENT needed: as per Podiatry, PT/OT    Patient Discharge Instructions    Emmanuel Thomas / [id number] : 1965    Admitted 2018 Discharged: 10/11/2018     Take Home Medications     · It is important that you take the medications exactly as they are prescribed. · Do not take other medications without consulting your doctor. · Have prescriptions filled and take medications as directed  · If medication causes stomach upset, headache, rash or other abnormal reactions discontinue use and  CALL THE DOCTOR    What to do at Home                                              Proper care during the postoperative period is an integral part of your surgical treatment program.  It is imperative that these instructions are followed to insure proper healing and to obtain the best results. Elevate your feet six inches above hip level by supporting feet and legs with pillows. Limited swelling is expected. Occasionally, the skin may take on a bruised appearance. There is no cause for alarm. Keep your bandages/cast clean and dry. Wound Care Orders:    Do not change right foot dressing!!!   Activity:    ·  Weight bearing as tolerated right foot  ·  Partial weight bearing_____to heel only in surgical shoe left foot__________      CALL THE OFFICE IMMEDIATELY if:    · The bandages become overly stained  · Your medications do not stop the discomfort  · You bump or injury the surgical site  · You develop a fever above 100 degrees  · You get your dressings wet    Report to your surgeon/ doctor's office as scheduled or if you do not have a return appointment call [address] [facility] to schedule an appointment fro one week from discharge. [phone]    Information obtained by :  I understand that if any problems occur once I am at home I am to contact my physician. I understand and acknowledge receipt of the instructions indicated above.                                                                                                                                            Physician's or R.N.'s Signature                                                                  Date/Time                                                                                                                                              Patient or Representative Signature                                                          Date/Time

## 2018-10-12 VITALS
WEIGHT: 228 LBS | TEMPERATURE: 97.6 F | OXYGEN SATURATION: 96 % | SYSTOLIC BLOOD PRESSURE: 142 MMHG | BODY MASS INDEX: 30.22 KG/M2 | DIASTOLIC BLOOD PRESSURE: 86 MMHG | HEIGHT: 73 IN | HEART RATE: 56 BPM | RESPIRATION RATE: 18 BRPM

## 2018-10-12 PROBLEM — M10.9 GOUT: Chronic | Status: ACTIVE | Noted: 2018-10-12

## 2018-10-12 PROBLEM — E03.9 HYPOTHYROIDISM: Chronic | Status: ACTIVE | Noted: 2018-10-12

## 2018-10-12 PROBLEM — Z91.199 NONADHERENCE TO MEDICAL TREATMENT: Chronic | Status: ACTIVE | Noted: 2018-10-12

## 2018-10-12 PROBLEM — R00.1 SINUS BRADYCARDIA: Status: ACTIVE | Noted: 2018-10-12

## 2018-10-12 LAB
ANION GAP SERPL CALC-SCNC: 8 MMOL/L (ref 5–15)
BUN SERPL-MCNC: 10 MG/DL (ref 6–20)
BUN/CREAT SERPL: 14 (ref 12–20)
CALCIUM SERPL-MCNC: 8.7 MG/DL (ref 8.5–10.1)
CHLORIDE SERPL-SCNC: 105 MMOL/L (ref 97–108)
CO2 SERPL-SCNC: 27 MMOL/L (ref 21–32)
CREAT SERPL-MCNC: 0.72 MG/DL (ref 0.7–1.3)
DATE LAST DOSE: ABNORMAL
ERYTHROCYTE [DISTWIDTH] IN BLOOD BY AUTOMATED COUNT: 15.3 % (ref 11.5–14.5)
GLUCOSE SERPL-MCNC: 91 MG/DL (ref 65–100)
HCT VFR BLD AUTO: 35.6 % (ref 36.6–50.3)
HGB BLD-MCNC: 11.4 G/DL (ref 12.1–17)
MCH RBC QN AUTO: 28.6 PG (ref 26–34)
MCHC RBC AUTO-ENTMCNC: 32 G/DL (ref 30–36.5)
MCV RBC AUTO: 89.2 FL (ref 80–99)
NRBC # BLD: 0 K/UL (ref 0–0.01)
NRBC BLD-RTO: 0 PER 100 WBC
PLATELET # BLD AUTO: 222 K/UL (ref 150–400)
PMV BLD AUTO: 10.5 FL (ref 8.9–12.9)
POTASSIUM SERPL-SCNC: 4.3 MMOL/L (ref 3.5–5.1)
RBC # BLD AUTO: 3.99 M/UL (ref 4.1–5.7)
REPORTED DOSE,DOSE: ABNORMAL UNITS
REPORTED DOSE/TIME,TMG: ABNORMAL
SODIUM SERPL-SCNC: 140 MMOL/L (ref 136–145)
VANCOMYCIN TROUGH SERPL-MCNC: 15.2 UG/ML (ref 5–10)
WBC # BLD AUTO: 8.4 K/UL (ref 4.1–11.1)

## 2018-10-12 PROCEDURE — 74011250637 HC RX REV CODE- 250/637: Performed by: INTERNAL MEDICINE

## 2018-10-12 PROCEDURE — 80048 BASIC METABOLIC PNL TOTAL CA: CPT | Performed by: INTERNAL MEDICINE

## 2018-10-12 PROCEDURE — 74011250637 HC RX REV CODE- 250/637: Performed by: HOSPITALIST

## 2018-10-12 PROCEDURE — 80202 ASSAY OF VANCOMYCIN: CPT | Performed by: PODIATRIST

## 2018-10-12 PROCEDURE — 74011250636 HC RX REV CODE- 250/636: Performed by: HOSPITALIST

## 2018-10-12 PROCEDURE — 36415 COLL VENOUS BLD VENIPUNCTURE: CPT | Performed by: INTERNAL MEDICINE

## 2018-10-12 PROCEDURE — 85027 COMPLETE CBC AUTOMATED: CPT | Performed by: INTERNAL MEDICINE

## 2018-10-12 RX ORDER — METHADONE HYDROCHLORIDE 5 MG/5ML
115 SOLUTION ORAL DAILY
Qty: 115 ML | Refills: 0 | Status: ON HOLD | OUTPATIENT
Start: 2018-10-13 | End: 2019-04-24

## 2018-10-12 RX ORDER — OXYCODONE AND ACETAMINOPHEN 5; 325 MG/1; MG/1
1 TABLET ORAL
Qty: 5 TAB | Refills: 0 | Status: ON HOLD | OUTPATIENT
Start: 2018-10-12 | End: 2019-05-21

## 2018-10-12 RX ORDER — IBUPROFEN 600 MG/1
600 TABLET ORAL
Status: DISCONTINUED | OUTPATIENT
Start: 2018-10-12 | End: 2018-10-12 | Stop reason: HOSPADM

## 2018-10-12 RX ORDER — COLCHICINE 0.6 MG/1
0.6 TABLET ORAL DAILY
Qty: 30 TAB | Refills: 0 | Status: ON HOLD
Start: 2018-10-12 | End: 2019-04-24 | Stop reason: CLARIF

## 2018-10-12 RX ORDER — AMOXICILLIN AND CLAVULANATE POTASSIUM 875; 125 MG/1; MG/1
1 TABLET, FILM COATED ORAL EVERY 12 HOURS
Status: DISCONTINUED | OUTPATIENT
Start: 2018-10-12 | End: 2018-10-12 | Stop reason: HOSPADM

## 2018-10-12 RX ORDER — AMOXICILLIN AND CLAVULANATE POTASSIUM 875; 125 MG/1; MG/1
1 TABLET, FILM COATED ORAL EVERY 12 HOURS
Qty: 19 TAB | Refills: 0 | Status: ON HOLD
Start: 2018-10-12 | End: 2019-04-24 | Stop reason: CLARIF

## 2018-10-12 RX ORDER — ACETAMINOPHEN 325 MG/1
650 TABLET ORAL
Qty: 30 TAB | Refills: 0 | Status: ON HOLD
Start: 2018-10-12 | End: 2020-10-21

## 2018-10-12 RX ORDER — IBUPROFEN 600 MG/1
600 TABLET ORAL
Qty: 10 TAB | Refills: 0 | Status: SHIPPED
Start: 2018-10-12 | End: 2019-05-27

## 2018-10-12 RX ORDER — LANOLIN ALCOHOL/MO/W.PET/CERES
3 CREAM (GRAM) TOPICAL
Qty: 10 TAB | Refills: 0 | Status: ON HOLD
Start: 2018-10-12 | End: 2022-10-21

## 2018-10-12 RX ORDER — AMOXICILLIN 250 MG
1 CAPSULE ORAL 2 TIMES DAILY
Qty: 60 TAB | Refills: 0 | Status: SHIPPED
Start: 2018-10-12 | End: 2019-05-20

## 2018-10-12 RX ADMIN — OXYCODONE HYDROCHLORIDE AND ACETAMINOPHEN 1 TABLET: 5; 325 TABLET ORAL at 10:50

## 2018-10-12 RX ADMIN — COLCHICINE 0.6 MG: 0.6 TABLET, FILM COATED ORAL at 08:49

## 2018-10-12 RX ADMIN — TAMSULOSIN HYDROCHLORIDE 0.4 MG: 0.4 CAPSULE ORAL at 08:50

## 2018-10-12 RX ADMIN — METHADONE HYDROCHLORIDE 115 MG: 5 SOLUTION ORAL at 08:51

## 2018-10-12 RX ADMIN — VANCOMYCIN HYDROCHLORIDE 1750 MG: 10 INJECTION, POWDER, LYOPHILIZED, FOR SOLUTION INTRAVENOUS at 05:04

## 2018-10-12 RX ADMIN — LISINOPRIL 20 MG: 20 TABLET ORAL at 08:49

## 2018-10-12 RX ADMIN — AMOXICILLIN AND CLAVULANATE POTASSIUM 1 TABLET: 875; 125 TABLET, FILM COATED ORAL at 12:01

## 2018-10-12 RX ADMIN — OXYCODONE HYDROCHLORIDE AND ACETAMINOPHEN 1 TABLET: 5; 325 TABLET ORAL at 02:31

## 2018-10-12 RX ADMIN — OXYCODONE HYDROCHLORIDE AND ACETAMINOPHEN 1 TABLET: 5; 325 TABLET ORAL at 14:03

## 2018-10-12 RX ADMIN — Medication 10 ML: at 05:05

## 2018-10-12 RX ADMIN — ACETAMINOPHEN 650 MG: 325 TABLET ORAL at 12:01

## 2018-10-12 RX ADMIN — ACETAMINOPHEN 650 MG: 325 TABLET ORAL at 07:55

## 2018-10-12 RX ADMIN — ZOLPIDEM TARTRATE 5 MG: 5 TABLET ORAL at 02:31

## 2018-10-12 RX ADMIN — OXYCODONE HYDROCHLORIDE AND ACETAMINOPHEN 1 TABLET: 5; 325 TABLET ORAL at 06:29

## 2018-10-12 RX ADMIN — Medication 10 ML: at 14:00

## 2018-10-12 RX ADMIN — LEVOTHYROXINE SODIUM 88 MCG: 88 TABLET ORAL at 06:30

## 2018-10-12 NOTE — PROGRESS NOTES
Patient has been accepted by [address] (formerly [facility]). Insurance authorization has been obtained. Spoke with Caitlin Guthrie, liaison for [address] [place] [address], to confirm plans (V#[phone]). Patient declined ambulance or wheelchair [place] transportation. Patient's prefers for his friend, Parris Prieto, to pick him up from the hospital.  Spoke with Parris Prieto, but she cannot confirm or deny that she will be able to pick the patient up. Patient is agreeable to transportation arrangements in the event that she cannot drive him. Patient will need ambulance transportation. Referral sent via [employer] to [facility] ([facility]) for a 2PM  time. [facility] confirmed that they are able to pick the patient up at [facility].  Later canceled trip with [facility] by calling Huang Vargas, liaison for [facility]. Patient was driven by his friend, Parris Prieto. Discharge envelope is on the hard chart. The nurse will need to add the [facility], Kardex, completed EMTALA, and call report Q#[phone]. This  will fax the discharge instructions to T#[phone]. Care Management will continue to follow his disposition.    JUVENCIO Reeves

## 2018-10-12 NOTE — DISCHARGE SUMMARY
Discharge Summary       PATIENT ID: Lui Rehman  MRN: [id number]   YOB: 1965    DATE OF ADMISSION: 9/28/2018  6:52 PM    DATE OF DISCHARGE: 10/12/18   PRIMARY CARE PROVIDER: Lopez Corral MD     DISCHARGING PHYSICIAN: Luke Fernandez MD    To contact this individual call [phone] and ask the  to page. If unavailable ask to be transferred the Adult Hospitalist Department. CONSULTATIONS: IP CONSULT TO PODIATRY  IP CONSULT TO INFECTIOUS DISEASES    PROCEDURES/SURGERIES: Procedure(s):  10/1 Excisional debridement of left foot and heel venous insufficiency type ulcers with application of Grafix placental membrane graft. 84/31 APPLICATION GRAFIX LEFT HEEL AND FOOT     ADMITTING DIAGNOSES & HOSPITAL COURSE:   48 yo man with h/o stroke, HTN, chronic pain, chronic left foot ulcer presented to the ED from home on 9/28/18 with worsening of his ulcers and yellowish drainage. He reports an ulcer on the dorsum of his left foot that is reportedly the result of trauma that has been present for about a year, and a few weeks ago developed another ulcer on the medial aspect of the foot.  He reports following in a wound care clinic and recently being prescribed ciprofloxacin and clindamycin which did not help.     10/12 pt c/o severe pain in the left foot but seen by Dr Vipin Lepe and dressing and neurovascular status intact per Dr Elma Morales left heel infected ulcer (POA) - s/p left foot wound and left heel debridement and Grafix application 89/1 by Dr Vipin Lepe  -?traumatic, appears mildly infected with surrounding cellulitic changes at medial side of left foot and dorsal side anteriorly foot  - no improvement with clindamycin and ciprofloxacin PO outpatient  - wound Cx 57/1 diphtheroids, Alicaligenes  - currently on vanc and ceftriaxone inpatient  - XR right foot 9/28 soft tissue swelling along the dorsum of the foot  - wound care consulted  - Podiatrist following: PWB, with surgical shoe, foot elevation; dressing changes  - s/p 2nd Grafix application 59/93  - ID following: discharge on Augmentin 875mg BID x10 days  - pain control; difficult as pt on a number of opiates  - PWB left foot with surgical shoe      Per previous hospitalist, \"patient is belligerent, because his methadone fell off, and he requests to be back on methadone so we will stop Dilaudid IV as there is risk of adverse effects from opiates. \"      Nonadherence to medical treatment - pt is non-compliant with the PWB, refuses to participate in PT, stating that he cannot use his left foot in PWB fashion due to pain although he was going back and forth from the bed to bathroom without the surgical shoe  - care and instruction reinforced  - d/w nursing staff   - patient advised to ring bell to have help going to bathroom and back      HTN - continue lisinopril      Sinus bradycardia - asymptomatic  - refused ECG ordered 10/9pm      Chronic pain - on  methadone  - need to confirm outpatient methadone presciber      Gout - controlled with allopurinol       H/o bladder cancer s/p surgery 4/2018 - stable      Hypothyroidism - TSH WNL; continue levothyroxine      H/o hemorrhagic stroke with left sided hemiparesis   - patient uses wheelchair and walker but unable to do so [facility] left as per Podiatry        Chronic opiate dependence - methadone resumed as per patient's request     Constipation - resolved with bowel regimen       DISCHARGE DIAGNOSES / PLAN:      Stable for discharge to [facility] rehab.  Follow up with PCP, Podiatry       PENDING TEST RESULTS:   At the time of discharge the following test results are still pending: none    FOLLOW UP APPOINTMENTS:    Follow-up Information     Follow up With Details Comments Kelle Stahl In 1 day [address] James Finley MD In 1 week hospital follow up [address] [facility],[address], DPM   [address]  [address] [facility] [address]  Aryan Shafergarciapalomo 1 In 1 week follow up with Dr Hao Jimenez at [facility] 1 week post-discharge [address] [facility]           ADDITIONAL CARE RECOMMENDATIONS:   1. Take medications as prescribed. 2. Keep appointment(s) as recommended/scheduled. 3. Patient on chronic methadone. 4. Take ibuprofen on full stomach. 5. Please follow up with your Urologist about the CT urogram.    DIET: heart healthy diet    ACTIVITY: See surgical instructions; PWB left foot with surgical shoe    WOUND CARE: see surgical instructions    EQUIPMENT needed: as per Podiatry, PT/OT      DISCHARGE MEDICATIONS:  Current Discharge Medication List      START taking these medications    Details   methadone (DOLOPHINE) 5 mg/5 mL oral solution Take 115 mL by mouth daily. Max Daily Amount: 115 mg. Last dose on Saturday 10/13/18  Qty: 115 mL, Refills: 0    Associated Diagnoses: Long term current use of methadone for pain control      acetaminophen (TYLENOL) 325 mg tablet Take 2 Tabs by mouth every four (4) hours as needed. Indications: Arthritic Pain  Qty: 30 Tab, Refills: 0      amoxicillin-clavulanate (AUGMENTIN) 875-125 mg per tablet Take 1 Tab by mouth every twelve (12) hours. Qty: 19 Tab, Refills: 0      ibuprofen (MOTRIN) 600 mg tablet Take 1 Tab by mouth every six (6) hours as needed. Take on full stomach  Qty: 10 Tab, Refills: 0      oxyCODONE-acetaminophen (PERCOCET) 5-325 mg per tablet Take 1 Tab by mouth every four (4) hours as needed. Max Daily Amount: 6 Tabs. Qty: 5 Tab, Refills: 0    Associated Diagnoses: Left foot infection      senna-docusate (PERICOLACE) 8.6-50 mg per tablet Take 1 Tab by mouth two (2) times a day.   Qty: 60 Tab, Refills: 0      melatonin 3 mg tablet Take 1 Tab by mouth nightly as needed. Qty: 10 Tab, Refills: 0         CONTINUE these medications which have CHANGED    Details   colchicine (COLCRYS) 0.6 mg tablet Take 1 Tab by mouth daily. For gout flare  Qty: 30 Tab, Refills: 0         CONTINUE these medications which have NOT CHANGED    Details   varenicline (CHANTIX) 1 mg tablet Take 1 mg by mouth two (2) times daily (after meals). levothyroxine (SYNTHROID) 88 mcg tablet Take 88 mcg by mouth Daily (before breakfast). lisinopril (PRINIVIL, ZESTRIL) 20 mg tablet Take 20 mg by mouth daily. Indications: hypertension      tamsulosin (FLOMAX) 0.4 mg capsule Take 0.4 mg by mouth daily. Indications: bladder cancer      allopurinol (ZYLOPRIM) 100 mg tablet Take 1 Tab by mouth daily. Qty: 30 Tab, Refills: 0         STOP taking these medications       ciprofloxacin HCl (CIPRO) 500 mg tablet Comments:   Reason for Stopping:         clindamycin (CLEOCIN) 300 mg capsule Comments:   Reason for Stopping:         traMADol (ULTRAM) 50 mg tablet Comments:   Reason for Stopping:         terbinafine HCl (LAMISIL) 250 mg tablet Comments:   Reason for Stopping:         methadone (DOLOPHINE) 10 mg/mL solution Comments:   Reason for Stopping:         lidocaine-prilocaine (EMLA) topical cream Comments:   Reason for Stopping:             NOTIFY YOUR PHYSICIAN FOR ANY OF THE FOLLOWING:   Fever over 101 degrees for 24 hours. Chest pain, shortness of breath, fever, chills, nausea, vomiting, diarrhea, change in mentation, falling, weakness, bleeding. Severe pain or pain not relieved by medications. Or, any other signs or symptoms that you may have questions about.     DISPOSITION:    Home With:   OT  PT  HH  RN      x [facility]    Independent/assisted living    Hospice    Other:       PATIENT CONDITION AT DISCHARGE:     Functional status    Poor    x Deconditioned     Independent      Cognition    x Lucid     Forgetful     Dementia      Catheters/lines (plus indication)    Gross     PICC     PEG    x None      Code status    x Full code     DNR      PHYSICAL EXAMINATION AT DISCHARGE:  Visit Vitals    /86 (BP 1 Location: Right arm, BP Patient Position: Lying left side)    Pulse (!) 56    Temp 97.6 °F (36.4 °C)    Resp 18    Ht 6' 1\" (1.854 m)    Wt 103.4 kg (228 lb)    SpO2 96%    BMI 30.08 kg/m2     Constitutional:  awake, no acute distress, cooperative, pleasant    ENT:  oral mucosa moist, oropharynx benign    Resp:  CTA bilaterally, no wheezing/rhonchi/rales   CV:  regular rhythm, bradycardic, no m/r/g appreciated    GI:  +BS, soft, non distended, non tender     Musculoskeletal:  BURNS except LUE and left foot    Neurologic:  AAOx3, left hemiparesis                                             Skin:  left foot dressing c/d/i in shoe    Labs  Recent Results (from the past 24 hour(s))   CBC W/O DIFF    Collection Time: 10/12/18  5:04 AM   Result Value Ref Range    WBC 8.4 4.1 - 11.1 K/uL    RBC 3.99 (L) 4.10 - 5.70 M/uL    HGB 11.4 (L) 12.1 - 17.0 g/dL    HCT 35.6 (L) 36.6 - 50.3 %    MCV 89.2 80.0 - 99.0 FL    MCH 28.6 26.0 - 34.0 PG    MCHC 32.0 30.0 - 36.5 g/dL    RDW 15.3 (H) 11.5 - 14.5 %    PLATELET 222 234 - 280 K/uL    MPV 10.5 8.9 - 12.9 FL    NRBC 0.0 0  WBC    ABSOLUTE NRBC 0.00 0.00 - 7.99 K/uL   METABOLIC PANEL, BASIC    Collection Time: 10/12/18  5:04 AM   Result Value Ref Range    Sodium 140 136 - 145 mmol/L    Potassium 4.3 3.5 - 5.1 mmol/L    Chloride 105 97 - 108 mmol/L    CO2 27 21 - 32 mmol/L    Anion gap 8 5 - 15 mmol/L    Glucose 91 65 - 100 mg/dL    BUN 10 6 - 20 MG/DL    Creatinine 0.72 0.70 - 1.30 MG/DL    BUN/Creatinine ratio 14 12 - 20      GFR est AA >60 >60 ml/min/1.73m2    GFR est non-AA >60 >60 ml/min/1.73m2    Calcium 8.7 8.5 - 10.1 MG/DL   VANCOMYCIN, TROUGH    Collection Time: 10/12/18  5:04 AM   Result Value Ref Range    Vancomycin,trough 15.2 (H) 5.0 - 10.0 ug/mL    Reported dose date: NOT PROVIDED      Reported dose time: NOT PROVIDED      Reported dose: NOT PROVIDED UNITS       CHRONIC MEDICAL DIAGNOSES:  Problem List as of 10/12/2018  Date Reviewed: 10/12/2018          Codes Class Noted - Resolved    Nonadherence to medical treatment (Chronic) ICD-10-CM: Z91.19  ICD-9-CM: V15.81  10/12/2018 - Present        Sinus bradycardia ICD-10-CM: R00.1  ICD-9-CM: 427.89  10/12/2018 - Present        Gout (Chronic) ICD-10-CM: M10.9  ICD-9-CM: 274.9  10/12/2018 - Present        Hypothyroidism (Chronic) ICD-10-CM: E03.9  ICD-9-CM: 244.9  10/12/2018 - Present        * (Principal)Foot ulcer ([facility] ICD-10-CM: L97.509  ICD-9-CM: 707.15  9/28/2018 - Present        Chronic obstructive pulmonary disease ([facility] ICD-10-CM: J44.9  ICD-9-CM: 783  8/8/2018 - Present        Chronic pain disorder ICD-10-CM: G89.4  ICD-9-CM: 338.4  8/8/2018 - Present        Hypokalemia ICD-10-CM: E87.6  ICD-9-CM: 276.8  8/8/2018 - Present        Seizure disorder ([facility] ICD-10-CM: G40.909  ICD-9-CM: 345.90  8/8/2018 - Present        Tobacco abuse ICD-10-CM: Z72.0  ICD-9-CM: 305.1  8/8/2018 - Present        Major depression ICD-10-CM: F32.9  ICD-9-CM: 296.20  6/6/2018 - Present        Uncomplicated opioid dependence ([facility] ICD-10-CM: F11.20  ICD-9-CM: 304.00  6/6/2018 - Present        Left foot infection ICD-10-CM: L08.9  ICD-9-CM: 686.9  3/13/2018 - Present        Malignant neoplasm of urinary bladder ([facility] ICD-10-CM: C67.9  ICD-9-CM: 188.9  3/6/2018 - Present        FH: bladder cancer ICD-10-CM: Z80.52  ICD-9-CM: V16.52  3/6/2018 - Present        Long term current use of methadone for pain control ICD-10-CM: Z79.891  ICD-9-CM: V58.69  12/27/2017 - Present        Major depressive disorder with current active episode ICD-10-CM: F32.9  ICD-9-CM: 296.30  12/27/2017 - Present        Physical debility ICD-10-CM: R53.81  ICD-9-CM: 799.3  12/27/2017 - Present        HTN (hypertension) ICD-10-CM: I10  ICD-9-CM: 401.9  12/21/2017 - Present        Cellulitis of left lower extremity ICD-10-CM: L03.116  ICD-9-CM: 682.6  12/21/2017 - Present        Drug overdose ICD-10-CM: T50.901A  ICD-9-CM: 977.9, E980.5  10/9/2016 - Present        Thalamic pain syndrome ICD-10-CM: G89.0  ICD-9-CM: 338.0  5/1/2014 - Present        Brain aneurysm ICD-10-CM: I67.1  ICD-9-CM: 437.3  12/30/2013 - Present        Chronic pain ICD-10-CM: G89.29  ICD-9-CM: 338.29  12/30/2013 - Present    Overview Signed 3/6/2018 12:03 PM by Sri Carrero     Last Assessment & Plan:   [facility]  Anomalous Vertebra? No    Allergies that may influence a procedure:   no    Medications that may influence a procedure:   No    PMSH that may influence a procedure:   no    Diagnostic Tests:  EMG/NCS: not done   MRI: Images independently viewed, agree with report   X-ray: Images independently viewed, agree with report     Prior treatments:  Physical Therapy: Yes  Medications tried: opioids  Outside Records: All outside records that may have been scanned into [facility] have been reviewed and discussed with the patient.   Procedures: none    This patient seen in consultation referred by Dr. Denise Santos For neck pain   RECOMMENDATIONS:  - Oxycodone   - Oxycontin  - Discharge from practice    - High risk of cervical interventions discussed (spinal cord injury, paralysis, seizure, stroke, death)    If fails to progress,  - spine surgery consultation for open surgical procedure  - pain medicine consultation if non-operative care chosen    Key diagnostic test images:  reviewed             Neurologic gait dysfunction ICD-10-CM: R26.9  ICD-9-CM: 781.2  12/30/2013 - Present        Spasticity ICD-10-CM: R25.2  ICD-9-CM: 781.0  12/30/2013 - Present        Cerebral infarction [facility]) ICD-10-CM: I63.9  ICD-9-CM: 434.91  12/12/2012 - Present        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  ICD-9-CM: 338.0  12/12/2012 - Present        Cerebral hemorrhage with hemiparesis ([facility] ICD-10-CM: I61.9, G81.90  ICD-9-CM: 431, 342.90  4/15/2011 - Present        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     Signed:   Sandra Lindsay MD  10/12/2018  1:40 PM

## 2018-10-12 NOTE — PROGRESS NOTES
Progress Note    Patient: Yvonne Reyes MRN: [id number]  SSN: [id number]    YOB: 1965  Age: 47 y.o. Sex: male      Admit Date: 9/28/2018  8 Days Post-Op, 2 day post op 2nd application of Grafix    Procedure:   Procedure(s):  LEFT FOOT WOUND AND LEFT HEEL  DEBRIDEMENT AND GRAFIX APPLICATION TO LEFT FOOT AND LEFT HEEL. Subjective:     Patient seen resting quiet and comfortably and no apparent distress. Pain is worse today. Dressing is intact, NV status intact     Status post: left heel and foot ulcer debridement and application of Grafix    Objective:     Visit Vitals    /86 (BP 1 Location: Right arm, BP Patient Position: Lying left side)    Pulse (!) 56    Temp 97.6 °F (36.4 °C)    Resp 18    Ht 6' 1\" (1.854 m)    Wt 103.4 kg (228 lb)    SpO2 96%    BMI 30.08 kg/m2        Physical Exam:  normal DP and PT pulses and reduced sensation at left foot with dressing and ACE intact    Labs/Radiology Review: images and reports reviewed    Assessment:     Hospital Problems  Date Reviewed: 10/1/2018          Codes Class Noted POA    * (Principal)Foot ulcer ([facility] ICD-10-CM: L97.509  ICD-9-CM: 707.15  9/28/2018 Unknown        Stroke ([facility] ICD-10-CM: I63.9  ICD-9-CM: 434.91  3/11/2011 Yes        Hypertension ICD-10-CM: I10  ICD-9-CM: 401.9  3/11/2011 Yes              Plan/Recommendations/Medical Decision Making:     Continue present treatment    Activity: PWB to left foot with surgical shoe    Discontinue: n/a    Diet: regular    Education: Ulcer care   Continue ABX per ID.   PWB left foot with surgical shoe at PT  OK for DC from my standpoint  Follow up with me at [facility] in one week    Signed By: Tanya Saleh DPM     October 12, 2018

## 2018-10-12 NOTE — PROGRESS NOTES
Patient seen/examined. Patient medically stable for discharge to rehab and auth approved this morning. Pt c/o severe pain in the left foot. Have requested Dr Odette Sibley to see the patient prior to discharge.     Jillian Castro MD, S

## 2018-10-12 NOTE — PROGRESS NOTES
Bedside and Verbal shift change report given to EMERY Henriquez (oncoming nurse) by Shu Garcia RN (offgoing nurse). Report included the following information SBAR, Kardex, ED Summary, Intake/Output, MAR and Recent Results.

## 2018-10-12 NOTE — PROGRESS NOTES
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 If I want to be taken home by my friend that's who I will go with. \"

## 2018-10-12 NOTE — PROGRESS NOTES
Pharmacist Note - Vancomycin Dosing  Therapy day 14  Indication:  Left foot ulcer s/p left heel/foot ulcer debridements   Current regimen: 1750 mg IV q 12 h    A Trough Level resulted at 15.2 mcg/mL which was obtained 11 hrs post-dose. The extrapolated \"true\" trough is approximately 13.83 mcg/mL based on the patient's known kinetics. Goal trough: 10 - 15 mcg/mL     Plan: Continue current regimen. Pharmacy will continue to monitor this patient daily for changes in clinical status and renal function.

## 2018-10-17 ENCOUNTER — HOSPITAL ENCOUNTER (OUTPATIENT)
Dept: WOUND CARE | Age: 53
Discharge: HOME | End: 2018-10-17
Payer: MEDICAID

## 2018-10-17 VITALS
RESPIRATION RATE: 16 BRPM | SYSTOLIC BLOOD PRESSURE: 150 MMHG | HEART RATE: 81 BPM | TEMPERATURE: 97.4 F | DIASTOLIC BLOOD PRESSURE: 94 MMHG

## 2018-10-17 PROCEDURE — 74011000250 HC RX REV CODE- 250: Performed by: PODIATRIST

## 2018-10-17 PROCEDURE — 99205 OFFICE O/P NEW HI 60 MIN: CPT

## 2018-10-17 PROCEDURE — 29581 APPL MULTLAYER CMPRN SYS LEG: CPT

## 2018-10-17 PROCEDURE — 99214 OFFICE O/P EST MOD 30 MIN: CPT

## 2018-10-17 RX ORDER — LIDOCAINE HYDROCHLORIDE 40 MG/ML
SOLUTION TOPICAL
Status: COMPLETED | OUTPATIENT
Start: 2018-10-17 | End: 2018-10-17

## 2018-10-17 RX ADMIN — LIDOCAINE HYDROCHLORIDE 5 ML: 40 SOLUTION TOPICAL at 16:00

## 2018-10-17 NOTE — WOUND CARE
Discharge Condition:stable  Ambulatory Status: with cane  Discharge Destination: rehab  Transportation: personal car  Accompanied by: friend

## 2018-10-17 NOTE — PROGRESS NOTES
[facility] 
WOUND CARE PROGRESS NOTE Manish Tomlinson 
MR#: [id number] : 1965 ACCOUNT #: [de-identified] DATE OF SERVICE: 10/17/2018 CONSULTING PHYSICIAN:  Perry Hernandez DPM  
 
REASON FOR CONSULTATION:  Evaluation and treatment of left lower extremity venous insufficiency type dorsal foot wound and left medial heel foot wound. HISTORY OF PRESENT ILLNESS:  The patient is a pleasant 50-year-old white male with an extensive past medical history. He is sell known to me from previous visits as well as consultations at [facility].  He presents today for followup treatment of both of these wounds on the left lower extremity, which have been treated with debridements, local wound care, Grafix applications in the hospital as well as IV antibiotics and compression therapy. He is currently in [facility] for rehab and antibiotics. He presents today denying any fevers, chills, sweats, nausea or vomiting. He does relate having some diarrhea, which he attributes to his antibiotics. His past medical history, surgical history, allergies and medications are all unchanged. REVIEW OF SYSTEMS:  Negative except for that written in the history of present illness. PHYSICAL EXAMINATION: 
VITAL SIGNS:  Stable. He is afebrile. EXTREMITIES:  Examination of his left lower extremity reveals chronic nonpitting edema in the left leg with palpable pedal pulses. He has got a dorsal foot wound, which is fibro-granular, as well as a medial left heel wound, which is fibro-granular. The measurements are recorded in the chart. These are both clean and healthy without any signs of infection and appear to be consistent with venous insufficiency.  
 
ASSESSMENT AND PLAN:  Left lower extremity venous insufficiency type dorsal foot wound and left medial heel wound in a patient with chronic pain syndrome and chronic left lower extremity venous insufficiency and left-sided weakness from a stroke, which is improving with local wound care and compression therapy. At today's visit, I explained to the patient the need to continue with local wound care, continue with compression therapy. Although we were able to apply Grafix in the hospital, we are unable to do this as an outpatient at this time until we get some type of authorization from insurance, which we will look into. For now, we are going to apply a Nasreen Ag dressing followed by a 3-layer multilayer compression wrap to manage the edema. The patient will return for followup next week for a nursing visit to change the 3-layer wrap and then subsequently in 2 weeks to follow up with me. JENAE Noriega 
D: 10/17/2018 16:40    
T: 10/17/2018 16:59 
JOB #: [id number]

## 2018-10-17 NOTE — WOUND CARE
10/17/18 1525   Wound Foot Left;Medial   Date First Assessed/Time First Assessed: 10/17/18 1524   POA: Yes  Wound Type: Neuropathic  Location: Foot  Orientation: Left;Medial   DRESSING STATUS Clean, dry, and intact   DRESSING TYPE Dry dressing   Non-Pressure Injury Partial thickness (epider/derm)   Wound Length (cm) 2.3 cm   Wound Width (cm) 2.3 cm   Wound Depth (cm) 0.1   Wound Surface area (cm^2) 5.29 cm^2   Condition of Base Slough;Pink;Granulation   Drainage Amount  Small    Drainage Color Serosanguinous   Wound Odor None   Periwound Skin Condition Erythema, blanchable   Cleansing and Cleansing Agents  Normal saline   Wound Foot Dorsal   Date First Assessed/Time First Assessed: 10/17/18 1526   POA: Yes  Wound Type: Neuropathic  Location: Foot  Orientation: Dorsal   DRESSING STATUS Clean, dry, and intact   DRESSING TYPE Dry dressing   Non-Pressure Injury Partial thickness (epider/derm)   Wound Length (cm) 2.5 cm   Wound Width (cm) 1.9 cm   Wound Depth (cm) 0.1   Wound Surface area (cm^2) 4.75 cm^2   Condition of Base Slough;Granulation   Drainage Amount  Small    Drainage Color Serosanguinous   Wound Odor None   Periwound Skin Condition Erythema, blanchable   Cleansing and Cleansing Agents  Normal saline

## 2018-10-24 ENCOUNTER — HOSPITAL ENCOUNTER (OUTPATIENT)
Dept: WOUND CARE | Age: 53
Discharge: HOME | End: 2018-10-24
Payer: MEDICAID

## 2018-10-24 LAB
AEROBIC ID BY SEQ, ORI2T: NORMAL
SPECIMEN SOURCE: NORMAL

## 2018-10-24 PROCEDURE — 99211 OFF/OP EST MAY X REQ PHY/QHP: CPT

## 2018-10-24 NOTE — PROGRESS NOTES
Patient presents to wound care center today for nurse visit for 3 layer wrap change. Patient presents to room with no 3 layer wrap to leg and just a dressing covering the foot. Patient's friend Aime Gonzalez accompanying him states she called yesterday around 3pm stating that patient could not tolerate the wrap. Checked with patients  Teena Chaves RN and she received no phone calls from patient or friend Aime Gonzalez. Faby Pacheco  received no calls from patient or friend. Patient and friend state that he will not put 3 layer wrap back on. This RN able to take current dressing off in clinic. This RN spoke with Dr. Nilesh Higgins to give orders for new dressing as previous orders were for 3 layer wrap and vivek. Dr. Nilesh Higgins insisted that patient have 3 layer wrap or patient \"may need to find a new wound care center for treatment. \" Dr. Nilesh Higgins in to speak with patient regarding 3 layer wrap. Patient and friend Aime Gonzalez adamantly  Refused and stated that his dermatologist suggested against the 3 layer wrap. Dr. Nilesh Higgins suggested patient to find new location to have his wound treated if he was not going to adhere or attempt the recommendations given. At that point patient was wrapped with non adherent to wound bed and dry gauze roll gauze. Patient and friend Aime Gonzalez refused vitals, measurements, pictures, and wound cleansing. Information on other wound care centers in the area given to patient. Records release forms given to patient so he can obtain his records for future treatment outside [address] [place], [place] [address] Of note during this encounter patient's friend Aime Gonzalez verbally aggressive making this RN feel very uncomfortable and fearful of escalation.

## 2018-10-25 ENCOUNTER — HOSPITAL ENCOUNTER (OUTPATIENT)
Dept: CT IMAGING | Age: 53
Discharge: HOME | End: 2018-10-25
Payer: MEDICAID

## 2018-10-25 DIAGNOSIS — C67.9 BLADDER CANCER (HCC): ICD-10-CM

## 2018-10-25 PROCEDURE — 74011250636 HC RX REV CODE- 250/636: Performed by: RADIOLOGY

## 2018-10-25 PROCEDURE — 74178 CT ABD&PLV WO CNTR FLWD CNTR: CPT

## 2018-10-25 PROCEDURE — 74011636320 HC RX REV CODE- 636/320: Performed by: RADIOLOGY

## 2018-10-25 RX ORDER — SODIUM CHLORIDE 9 MG/ML
50 INJECTION, SOLUTION INTRAVENOUS
Status: COMPLETED | OUTPATIENT
Start: 2018-10-25 | End: 2018-10-25

## 2018-10-25 RX ORDER — SODIUM CHLORIDE 0.9 % (FLUSH) 0.9 %
10 SYRINGE (ML) INJECTION
Status: COMPLETED | OUTPATIENT
Start: 2018-10-25 | End: 2018-10-25

## 2018-10-25 RX ADMIN — Medication 10 ML: at 14:27

## 2018-10-25 RX ADMIN — IOPAMIDOL 100 ML: 755 INJECTION, SOLUTION INTRAVENOUS at 14:27

## 2018-10-25 RX ADMIN — SODIUM CHLORIDE 50 ML/HR: 900 INJECTION, SOLUTION INTRAVENOUS at 14:27

## 2018-10-26 LAB
AEROBIC ID BY MALDI, ORJ11T: NORMAL
BACTERIA ISLT: NORMAL
ORG ID BY SEQUENCING, ORI1T: NORMAL
ORGANISM ID BY MALDI, ORJ1T: NORMAL
OTHER ANTIBIOTIC SUSC ISLT: NORMAL
OTHER ANTIBIOTIC SUSC ISLT: NORMAL
SOURCE, RSRC62: NORMAL
SOURCE, RSRC67: NORMAL
SOURCE, RSRC70: NORMAL
SPECIMEN SOURCE: NORMAL
SPECIMEN SOURCE: NORMAL

## 2019-04-23 ENCOUNTER — APPOINTMENT (OUTPATIENT)
Dept: GENERAL RADIOLOGY | Age: 54
DRG: 383 | End: 2019-04-23
Attending: PHYSICIAN ASSISTANT
Payer: MEDICAID

## 2019-04-23 ENCOUNTER — HOSPITAL ENCOUNTER (INPATIENT)
Age: 54
LOS: 17 days | Discharge: HOME | DRG: 383 | End: 2019-05-10
Attending: EMERGENCY MEDICINE | Admitting: INTERNAL MEDICINE
Payer: MEDICAID

## 2019-04-23 DIAGNOSIS — E87.6 HYPOKALEMIA: ICD-10-CM

## 2019-04-23 DIAGNOSIS — T14.8XXA WOUND INFECTION: Primary | ICD-10-CM

## 2019-04-23 DIAGNOSIS — L08.9 WOUND INFECTION: Primary | ICD-10-CM

## 2019-04-23 LAB
ALBUMIN SERPL-MCNC: 4.5 G/DL (ref 3.5–5)
ALBUMIN/GLOB SERPL: 1.4 {RATIO} (ref 1.1–2.2)
ALP SERPL-CCNC: 122 U/L (ref 45–117)
ALT SERPL-CCNC: 32 U/L (ref 12–78)
ANION GAP SERPL CALC-SCNC: 3 MMOL/L (ref 5–15)
AST SERPL-CCNC: 21 U/L (ref 15–37)
BASOPHILS # BLD: 0.1 K/UL (ref 0–0.1)
BASOPHILS NFR BLD: 1 % (ref 0–1)
BILIRUB SERPL-MCNC: 0.4 MG/DL (ref 0.2–1)
BUN SERPL-MCNC: 6 MG/DL (ref 6–20)
BUN/CREAT SERPL: 8 (ref 12–20)
CALCIUM SERPL-MCNC: 9 MG/DL (ref 8.5–10.1)
CHLORIDE SERPL-SCNC: 102 MMOL/L (ref 97–108)
CO2 SERPL-SCNC: 31 MMOL/L (ref 21–32)
COMMENT, HOLDF: NORMAL
CREAT SERPL-MCNC: 0.8 MG/DL (ref 0.7–1.3)
CRP SERPL-MCNC: 0.99 MG/DL (ref 0–0.6)
DIFFERENTIAL METHOD BLD: ABNORMAL
EOSINOPHIL # BLD: 0.4 K/UL (ref 0–0.4)
EOSINOPHIL NFR BLD: 3 % (ref 0–7)
ERYTHROCYTE [DISTWIDTH] IN BLOOD BY AUTOMATED COUNT: 14.7 % (ref 11.5–14.5)
ERYTHROCYTE [SEDIMENTATION RATE] IN BLOOD: 12 MM/HR (ref 0–20)
GLOBULIN SER CALC-MCNC: 3.2 G/DL (ref 2–4)
GLUCOSE SERPL-MCNC: 76 MG/DL (ref 65–100)
HCT VFR BLD AUTO: 37.2 % (ref 36.6–50.3)
HGB BLD-MCNC: 12.3 G/DL (ref 12.1–17)
IMM GRANULOCYTES # BLD AUTO: 0 K/UL (ref 0–0.04)
IMM GRANULOCYTES NFR BLD AUTO: 0 % (ref 0–0.5)
LYMPHOCYTES # BLD: 3.6 K/UL (ref 0.8–3.5)
LYMPHOCYTES NFR BLD: 30 % (ref 12–49)
MCH RBC QN AUTO: 29.3 PG (ref 26–34)
MCHC RBC AUTO-ENTMCNC: 33.1 G/DL (ref 30–36.5)
MCV RBC AUTO: 88.6 FL (ref 80–99)
MONOCYTES # BLD: 1.1 K/UL (ref 0–1)
MONOCYTES NFR BLD: 9 % (ref 5–13)
NEUTS SEG # BLD: 7 K/UL (ref 1.8–8)
NEUTS SEG NFR BLD: 57 % (ref 32–75)
NRBC # BLD: 0 K/UL (ref 0–0.01)
NRBC BLD-RTO: 0 PER 100 WBC
PLATELET # BLD AUTO: 232 K/UL (ref 150–400)
PMV BLD AUTO: 10.2 FL (ref 8.9–12.9)
POTASSIUM SERPL-SCNC: 2.9 MMOL/L (ref 3.5–5.1)
PROT SERPL-MCNC: 7.7 G/DL (ref 6.4–8.2)
RBC # BLD AUTO: 4.2 M/UL (ref 4.1–5.7)
SAMPLES BEING HELD,HOLD: NORMAL
SODIUM SERPL-SCNC: 136 MMOL/L (ref 136–145)
WBC # BLD AUTO: 12.3 K/UL (ref 4.1–11.1)

## 2019-04-23 PROCEDURE — 86140 C-REACTIVE PROTEIN: CPT

## 2019-04-23 PROCEDURE — 84443 ASSAY THYROID STIM HORMONE: CPT

## 2019-04-23 PROCEDURE — 36415 COLL VENOUS BLD VENIPUNCTURE: CPT

## 2019-04-23 PROCEDURE — 87186 SC STD MICRODIL/AGAR DIL: CPT

## 2019-04-23 PROCEDURE — 80053 COMPREHEN METABOLIC PANEL: CPT

## 2019-04-23 PROCEDURE — 85652 RBC SED RATE AUTOMATED: CPT

## 2019-04-23 PROCEDURE — 87040 BLOOD CULTURE FOR BACTERIA: CPT

## 2019-04-23 PROCEDURE — 87077 CULTURE AEROBIC IDENTIFY: CPT

## 2019-04-23 PROCEDURE — 65270000029 HC RM PRIVATE

## 2019-04-23 PROCEDURE — 74011250637 HC RX REV CODE- 250/637: Performed by: PHYSICIAN ASSISTANT

## 2019-04-23 PROCEDURE — 73620 X-RAY EXAM OF FOOT: CPT

## 2019-04-23 PROCEDURE — 85025 COMPLETE CBC W/AUTO DIFF WBC: CPT

## 2019-04-23 PROCEDURE — 99284 EMERGENCY DEPT VISIT MOD MDM: CPT

## 2019-04-23 PROCEDURE — 74011250636 HC RX REV CODE- 250/636: Performed by: PHYSICIAN ASSISTANT

## 2019-04-23 PROCEDURE — 87205 SMEAR GRAM STAIN: CPT

## 2019-04-23 RX ORDER — ENOXAPARIN SODIUM 100 MG/ML
40 INJECTION SUBCUTANEOUS EVERY 24 HOURS
Status: DISCONTINUED | OUTPATIENT
Start: 2019-04-24 | End: 2019-05-10 | Stop reason: HOSPADM

## 2019-04-23 RX ORDER — NALOXONE HYDROCHLORIDE 0.4 MG/ML
0.4 INJECTION, SOLUTION INTRAMUSCULAR; INTRAVENOUS; SUBCUTANEOUS AS NEEDED
Status: DISCONTINUED | OUTPATIENT
Start: 2019-04-23 | End: 2019-05-10 | Stop reason: HOSPADM

## 2019-04-23 RX ORDER — POTASSIUM CHLORIDE 750 MG/1
40 TABLET, FILM COATED, EXTENDED RELEASE ORAL
Status: COMPLETED | OUTPATIENT
Start: 2019-04-23 | End: 2019-04-23

## 2019-04-23 RX ORDER — SODIUM CHLORIDE 0.9 % (FLUSH) 0.9 %
5-40 SYRINGE (ML) INJECTION EVERY 8 HOURS
Status: DISCONTINUED | OUTPATIENT
Start: 2019-04-23 | End: 2019-05-10 | Stop reason: HOSPADM

## 2019-04-23 RX ORDER — ACETAMINOPHEN 325 MG/1
650 TABLET ORAL
Status: DISCONTINUED | OUTPATIENT
Start: 2019-04-23 | End: 2019-05-10 | Stop reason: HOSPADM

## 2019-04-23 RX ORDER — SODIUM CHLORIDE 0.9 % (FLUSH) 0.9 %
5-40 SYRINGE (ML) INJECTION AS NEEDED
Status: DISCONTINUED | OUTPATIENT
Start: 2019-04-23 | End: 2019-05-10 | Stop reason: HOSPADM

## 2019-04-23 RX ORDER — DOXYCYCLINE HYCLATE 100 MG
100 TABLET ORAL EVERY 12 HOURS
Status: DISCONTINUED | OUTPATIENT
Start: 2019-04-23 | End: 2019-04-24

## 2019-04-23 RX ORDER — POTASSIUM CHLORIDE 7.45 MG/ML
10 INJECTION INTRAVENOUS
Status: COMPLETED | OUTPATIENT
Start: 2019-04-23 | End: 2019-04-23

## 2019-04-23 RX ORDER — SODIUM CHLORIDE 9 MG/ML
75 INJECTION, SOLUTION INTRAVENOUS CONTINUOUS
Status: DISPENSED | OUTPATIENT
Start: 2019-04-23 | End: 2019-04-24

## 2019-04-23 RX ADMIN — POTASSIUM CHLORIDE 10 MEQ: 10 INJECTION, SOLUTION INTRAVENOUS at 22:50

## 2019-04-23 RX ADMIN — POTASSIUM CHLORIDE 40 MEQ: 750 TABLET, EXTENDED RELEASE ORAL at 22:50

## 2019-04-23 NOTE — ED TRIAGE NOTES
Per EMS pt has wound to left foot after dropping HVAC on it 8 months ago. Home health reported wound appears to be infected. Denies fevers.

## 2019-04-24 ENCOUNTER — APPOINTMENT (OUTPATIENT)
Dept: MRI IMAGING | Age: 54
DRG: 383 | End: 2019-04-24
Attending: INTERNAL MEDICINE
Payer: MEDICAID

## 2019-04-24 LAB
BASOPHILS # BLD: 0 K/UL (ref 0–0.1)
BASOPHILS NFR BLD: 1 % (ref 0–1)
DIFFERENTIAL METHOD BLD: ABNORMAL
EOSINOPHIL # BLD: 0.4 K/UL (ref 0–0.4)
EOSINOPHIL NFR BLD: 5 % (ref 0–7)
ERYTHROCYTE [DISTWIDTH] IN BLOOD BY AUTOMATED COUNT: 14.9 % (ref 11.5–14.5)
HCT VFR BLD AUTO: 34.8 % (ref 36.6–50.3)
HGB BLD-MCNC: 11.3 G/DL (ref 12.1–17)
IMM GRANULOCYTES # BLD AUTO: 0 K/UL (ref 0–0.04)
IMM GRANULOCYTES NFR BLD AUTO: 0 % (ref 0–0.5)
LYMPHOCYTES # BLD: 2.5 K/UL (ref 0.8–3.5)
LYMPHOCYTES NFR BLD: 32 % (ref 12–49)
MCH RBC QN AUTO: 29.4 PG (ref 26–34)
MCHC RBC AUTO-ENTMCNC: 32.5 G/DL (ref 30–36.5)
MCV RBC AUTO: 90.6 FL (ref 80–99)
MONOCYTES # BLD: 0.9 K/UL (ref 0–1)
MONOCYTES NFR BLD: 12 % (ref 5–13)
NEUTS SEG # BLD: 4.1 K/UL (ref 1.8–8)
NEUTS SEG NFR BLD: 50 % (ref 32–75)
NRBC # BLD: 0 K/UL (ref 0–0.01)
NRBC BLD-RTO: 0 PER 100 WBC
PLATELET # BLD AUTO: 185 K/UL (ref 150–400)
PMV BLD AUTO: 10.5 FL (ref 8.9–12.9)
RBC # BLD AUTO: 3.84 M/UL (ref 4.1–5.7)
TSH SERPL DL<=0.05 MIU/L-ACNC: 21.3 UIU/ML (ref 0.36–3.74)
WBC # BLD AUTO: 8 K/UL (ref 4.1–11.1)

## 2019-04-24 PROCEDURE — 36415 COLL VENOUS BLD VENIPUNCTURE: CPT

## 2019-04-24 PROCEDURE — 74011000250 HC RX REV CODE- 250: Performed by: INTERNAL MEDICINE

## 2019-04-24 PROCEDURE — 74011000258 HC RX REV CODE- 258: Performed by: INTERNAL MEDICINE

## 2019-04-24 PROCEDURE — 85025 COMPLETE CBC W/AUTO DIFF WBC: CPT

## 2019-04-24 PROCEDURE — 74011250636 HC RX REV CODE- 250/636: Performed by: INTERNAL MEDICINE

## 2019-04-24 PROCEDURE — 77030011256 HC DRSG MEPILEX <16IN NO BORD MOLN -A

## 2019-04-24 PROCEDURE — 74011250637 HC RX REV CODE- 250/637: Performed by: INTERNAL MEDICINE

## 2019-04-24 PROCEDURE — 65270000029 HC RM PRIVATE

## 2019-04-24 PROCEDURE — HZ91ZZZ PHARMACOTHERAPY FOR SUBSTANCE ABUSE TREATMENT, METHADONE MAINTENANCE: ICD-10-PCS | Performed by: INTERNAL MEDICINE

## 2019-04-24 RX ORDER — METHADONE HYDROCHLORIDE 10 MG/ML
115 CONCENTRATE ORAL DAILY
Status: DISCONTINUED | OUTPATIENT
Start: 2019-04-24 | End: 2019-04-24

## 2019-04-24 RX ORDER — MORPHINE SULFATE 4 MG/ML
2 INJECTION INTRAVENOUS ONCE
Status: COMPLETED | OUTPATIENT
Start: 2019-04-24 | End: 2019-04-24

## 2019-04-24 RX ORDER — METHADONE HYDROCHLORIDE 10 MG/ML
130 CONCENTRATE ORAL DAILY
Status: DISCONTINUED | OUTPATIENT
Start: 2019-04-25 | End: 2019-05-10 | Stop reason: HOSPADM

## 2019-04-24 RX ORDER — LANOLIN ALCOHOL/MO/W.PET/CERES
3 CREAM (GRAM) TOPICAL
Status: DISCONTINUED | OUTPATIENT
Start: 2019-04-24 | End: 2019-05-10 | Stop reason: HOSPADM

## 2019-04-24 RX ORDER — LORAZEPAM 2 MG/ML
1 INJECTION INTRAMUSCULAR
Status: DISCONTINUED | OUTPATIENT
Start: 2019-04-24 | End: 2019-04-25

## 2019-04-24 RX ORDER — LEVOTHYROXINE SODIUM 88 UG/1
88 TABLET ORAL
Status: DISCONTINUED | OUTPATIENT
Start: 2019-04-24 | End: 2019-05-10 | Stop reason: HOSPADM

## 2019-04-24 RX ORDER — KETOROLAC TROMETHAMINE 30 MG/ML
30 INJECTION, SOLUTION INTRAMUSCULAR; INTRAVENOUS EVERY 6 HOURS
Status: DISPENSED | OUTPATIENT
Start: 2019-04-24 | End: 2019-04-28

## 2019-04-24 RX ORDER — TAMSULOSIN HYDROCHLORIDE 0.4 MG/1
0.4 CAPSULE ORAL DAILY
Status: DISCONTINUED | OUTPATIENT
Start: 2019-04-24 | End: 2019-05-10 | Stop reason: HOSPADM

## 2019-04-24 RX ORDER — METHADONE HYDROCHLORIDE 10 MG/ML
60 CONCENTRATE ORAL DAILY
Status: ON HOLD | COMMUNITY

## 2019-04-24 RX ORDER — ALLOPURINOL 100 MG/1
100 TABLET ORAL DAILY
Status: DISCONTINUED | OUTPATIENT
Start: 2019-04-24 | End: 2019-05-10 | Stop reason: HOSPADM

## 2019-04-24 RX ORDER — LISINOPRIL 20 MG/1
20 TABLET ORAL DAILY
Status: DISCONTINUED | OUTPATIENT
Start: 2019-04-24 | End: 2019-04-30

## 2019-04-24 RX ORDER — COLCHICINE 0.6 MG/1
0.6 TABLET ORAL
Status: ON HOLD | COMMUNITY
End: 2020-12-22 | Stop reason: SDUPTHER

## 2019-04-24 RX ORDER — LORAZEPAM 2 MG/ML
1 INJECTION INTRAMUSCULAR ONCE
Status: COMPLETED | OUTPATIENT
Start: 2019-04-24 | End: 2019-04-24

## 2019-04-24 RX ADMIN — KETOROLAC TROMETHAMINE 30 MG: 30 INJECTION, SOLUTION INTRAMUSCULAR at 23:15

## 2019-04-24 RX ADMIN — KETOROLAC TROMETHAMINE 30 MG: 30 INJECTION, SOLUTION INTRAMUSCULAR at 12:32

## 2019-04-24 RX ADMIN — SODIUM CHLORIDE 3 G: 900 INJECTION, SOLUTION INTRAVENOUS at 00:36

## 2019-04-24 RX ADMIN — LEVOTHYROXINE SODIUM 88 MCG: 88 TABLET ORAL at 05:36

## 2019-04-24 RX ADMIN — LISINOPRIL 20 MG: 20 TABLET ORAL at 08:20

## 2019-04-24 RX ADMIN — ALLOPURINOL 100 MG: 100 TABLET ORAL at 08:20

## 2019-04-24 RX ADMIN — ACETAMINOPHEN 650 MG: 325 TABLET ORAL at 12:39

## 2019-04-24 RX ADMIN — ACETAMINOPHEN 650 MG: 325 TABLET ORAL at 17:41

## 2019-04-24 RX ADMIN — SODIUM CHLORIDE 3 G: 900 INJECTION, SOLUTION INTRAVENOUS at 05:35

## 2019-04-24 RX ADMIN — ACETAMINOPHEN 650 MG: 325 TABLET ORAL at 08:20

## 2019-04-24 RX ADMIN — SODIUM CHLORIDE 75 ML/HR: 900 INJECTION, SOLUTION INTRAVENOUS at 07:00

## 2019-04-24 RX ADMIN — DOXYCYCLINE HYCLATE 100 MG: 100 TABLET, COATED ORAL at 08:20

## 2019-04-24 RX ADMIN — DOXYCYCLINE HYCLATE 100 MG: 100 TABLET, COATED ORAL at 00:36

## 2019-04-24 RX ADMIN — LORAZEPAM 1 MG: 2 INJECTION INTRAMUSCULAR; INTRAVENOUS at 20:06

## 2019-04-24 RX ADMIN — KETOROLAC TROMETHAMINE 30 MG: 30 INJECTION, SOLUTION INTRAMUSCULAR at 05:35

## 2019-04-24 RX ADMIN — SODIUM CHLORIDE 75 ML/HR: 900 INJECTION, SOLUTION INTRAVENOUS at 00:14

## 2019-04-24 RX ADMIN — DAPTOMYCIN 400 MG: 500 INJECTION, POWDER, LYOPHILIZED, FOR SOLUTION INTRAVENOUS at 19:19

## 2019-04-24 RX ADMIN — KETOROLAC TROMETHAMINE 30 MG: 30 INJECTION, SOLUTION INTRAMUSCULAR at 00:14

## 2019-04-24 RX ADMIN — SODIUM CHLORIDE 3 G: 900 INJECTION, SOLUTION INTRAVENOUS at 12:39

## 2019-04-24 RX ADMIN — TAMSULOSIN HYDROCHLORIDE 0.4 MG: 0.4 CAPSULE ORAL at 08:20

## 2019-04-24 RX ADMIN — SODIUM CHLORIDE 3 G: 900 INJECTION, SOLUTION INTRAVENOUS at 17:40

## 2019-04-24 RX ADMIN — ACETAMINOPHEN 650 MG: 325 TABLET ORAL at 00:05

## 2019-04-24 RX ADMIN — SODIUM CHLORIDE 3 G: 900 INJECTION, SOLUTION INTRAVENOUS at 23:14

## 2019-04-24 RX ADMIN — MORPHINE SULFATE 2 MG: 4 INJECTION, SOLUTION INTRAMUSCULAR; INTRAVENOUS at 23:14

## 2019-04-24 RX ADMIN — Medication 10 ML: at 23:15

## 2019-04-24 RX ADMIN — ENOXAPARIN SODIUM 40 MG: 40 INJECTION SUBCUTANEOUS at 08:21

## 2019-04-24 RX ADMIN — KETOROLAC TROMETHAMINE 30 MG: 30 INJECTION, SOLUTION INTRAMUSCULAR at 17:41

## 2019-04-24 RX ADMIN — METHADONE HYDROCHLORIDE 115 MG: 10 CONCENTRATE ORAL at 08:20

## 2019-04-24 NOTE — PROGRESS NOTES
Spiritual Care Assessment/Progress Note  [address]      NAME: Leelee Rey      MRN: [id number]  AGE: 46 y.o.  SEX: male  Latter day Affiliation: Church   Language: English     4/24/2019     Total Time (in minutes): 25     Spiritual Assessment begun in [facility] 4[facility] POST SURG ORT 1 through conversation with:         [x]Patient        [] Family    [] Friend(s)        Reason for Consult: Initial/Spiritual assessment, patient floor     Spiritual beliefs: (Please include comment if needed)     [x] Identifies with a kendy tradition:   Carrie      [] Supported by a kendy community:            [] Claims no spiritual orientation:           [] Seeking spiritual identity:                [] Adheres to an individual form of spirituality:           [] Not able to assess:                           Identified resources for coping:      [] Prayer                               [] Music                  [] Guided Imagery     [] Family/friends                 [] Pet visits     [] Devotional reading                         [] Unknown     [] Other:                                            Interventions offered during this visit: (See comments for more details)    Patient Interventions: Affirmation of emotions/emotional suffering, Affirmation of kendy, Catharsis/review of pertinent events in supportive environment, Coping skills reviewed/reinforced, Iconic (affirming the presence of God/Higher Power), Initial/Spiritual assessment, patient floor, Prayer (assurance of), Prayer (actual)           Plan of Care:     [x] Support spiritual and/or cultural needs    [] Support AMD and/or advance care planning process      [] Support grieving process   [] Coordinate Rites and/or Rituals    [] Coordination with community clergy   [] No spiritual needs identified at this time   [] Detailed Plan of Care below (See Comments)  [] Make referral to Music Therapy  [] Make referral to Pet Therapy     [] Make referral to Addiction services  [] Make referral to [facility]  [] Make referral to Spiritual Care Partner  [] No future visits requested        [x] Follow up visits as needed     Comments: Initial spiritual assessment in 4 Post Surg. Mr. Sherrell Vidal, shared he is not having his best day but things are improving. He appeared to be groggy though he shared much about his life. He lost a sibling at the age of 9, his mother in his teens, a sister in his twenties. He shared he has nothing after his divorce from his wife and that dosent' even mention his health challenges. Provdied listening ear as he shared. He admitted to feeling hopelessl  He has a southre [employer]ist belief in God though his [employer] where he grew up, [place], has changed and he not fond of the changes. Provided supportive presence and words of encouragement and prayer. He asked to another visit. Chaplains will continue to follow as able and/or needed.   Visited by: Francesca Cheema., MS., [phone] ([phone])

## 2019-04-24 NOTE — PROGRESS NOTES
VAT    Order acknowledged for midline, pt currently has working PIV access that was redressed/flushed by myself. RN is OK with working IV and pt declines to be stuck again for a different access until his treatment plan is more defined. MD also aware. PICC team will follow along and please contact at [phone] for assistance.

## 2019-04-24 NOTE — PROGRESS NOTES
[facility]: MED RECONCILIATION    Comments/Recommendations:     Pharmacist has attempted to meet with the patient four times this morning. Twice the patient was using the restroom, once the patient was eating breakfast and declined interview, and the fourth time the patient was off the unit of a test or procedure. The patient did report using methadone 130 mg daily. Pharmacist contacted [place] [phone] and the dose of methadone 130 mg daily was confirmed. Pharmacist will follow up at a later time.     Thank you,      Abril Padilla, PharmD, BCPS

## 2019-04-24 NOTE — PROGRESS NOTES
Pt attempted MRI. Claustrophobic, even though head was not inside of scanner. Pt would not continue.   RN informed

## 2019-04-24 NOTE — PROGRESS NOTES
4/24/2019  4:08 PM  Reason for Admission:   Foot infection                  RRAT Score:     15             Do you (patient/family) have any concerns for transition/discharge? Pt is homeless and a reported alcoholic. Plan for utilizing home health:   TBD    Current Advanced Directive/Advance Care Plan:  Not on file. Pt deferred. Likelihood of readmission? Yellow - if problems persist.             Transition of Care Plan:          CM met with pt for assessment briefly before being taken for MRI. In 2006, pr reported that he had a brain aneurism and stroke which caused left side paralysis. Pt reported that he experiences severe nerve pain due to stroke. Pt began receiving disability benefits, and is insured through [facility]. 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 Pt reported no prior drug dependence, but reported he is currently on methadone due to morphine dependence. CM contacted pt's DTR, Rose Harding [phone], for further assessment. DTR reported pt does not have a permanent address, and he often depends on family and friends for housing. DTR did not know where pt was last living. DTR expressed concern for her father as he drinks alcohol daily, and has a hx of depression and suicidal ideation. DTR reported pt also has poor medication management skills as he will take a 1/2 bottle of Tylenol to treat pain. Reportedly, pt required the use of a wheelchair, RW, and cane for ambulation. Pt is dependent on Medicaid transport and friends/family for transportation. SHERRI educated DTR on the [facility] and its benefits. DTR felt pt may benefit from this service. SHERRI to finalize assessment tomorrow, and discuss the [facility] with pt.      Debbie Burns MA

## 2019-04-24 NOTE — PROGRESS NOTES
[facility]: Antimicrobial Stewardship Progress Note  Consult for antibiotic dosing of Daptomycin by Dr. Javi Ureña  Pharmacist reviewed antibiotic appropriateness for 46year old , male  for indication of SSTI/Foot ulcer  Day of Therapy: 1    Plan:  Non-Kinetic Antimicrobial Dosing:   Begin Daptomycin 4mg/kg -rounded to 400 mg IV q24hr     Other Antimicrobial  (not dosed by pharmacist)   Unasyn 3gm IV q6hr   Cultures     4/23: Blood: NG x8hrs pending  4/23: Foot wound: GNR, enterococcus pending   Serum Creatinine     Lab Results   Component Value Date/Time    Creatinine 0.80 04/23/2019 09:11 PM    Creatinine (POC) 0.6 12/06/2010 01:26 PM         Creatinine Clearance Estimated Creatinine Clearance: 136.6 mL/min (based on SCr of 0.8 mg/dL).      Temp   97.8 °F (36.6 °C)    WBC   Lab Results   Component Value Date/Time    WBC 8.0 04/24/2019 06:55 AM         H/H   Lab Results   Component Value Date/Time    HGB 11.3 (L) 04/24/2019 06:55 AM          Platelets   Lab Results   Component Value Date/Time    PLATELET 914 83/79/4985 06:55 AM          Pharmacist: Signed Tegan Gandhi Contact information: [phone]

## 2019-04-24 NOTE — PROGRESS NOTES
[facility]: MED RECONCILIATION    Comments/Recommendations: Patient states he has taken all his meds yesterday however he is out of refills and will need new prescriptions for chronic meds (except Methadone)    Medications added:     None    Medications removed:    Augmentin- pt denies use of any recent antibiotics    Medications adjusted:    Colchicine changed to prn gout flare per patient    Information obtained from: Patient. Significant PMH/Disease States:   Past Medical History:   Diagnosis Date    Aneurysm ([facility] [address]     (with stroke)    Bladder tumor     Cancer ([facility] [address]     bladder CA    Chronic pain     Family history of bladder cancer     Gout     Hypercholesterolemia     Hypertension     Lesion of bladder     Seizures ([facility] [address]     Stroke [facility]) 2006    left sided weakness    Thromboembolus ([facility] [address]     Thyroid disease     TIA (transient ischemic attack) 2012     Chief Complaint for this Admission:   Chief Complaint   Patient presents with    Wound Check    Skin Problem     Allergies: Sulfamethoxazole-trimethoprim; Ciprofloxacin; Codeine; Cymbalta [duloxetine]; Gabapentin; Lyrica [pregabalin]; Sulfa (sulfonamide antibiotics); Ultram [tramadol]; and Vancomycin    Prior to Admission Medications:     Medication Documentation Review Audit       Reviewed by Ervin Jackson (Pharmacist) on 04/24/19 at 1405      Medication Sig Documenting Provider Last Dose Status Taking?   acetaminophen (TYLENOL) 325 mg tablet Take 2 Tabs by mouth every four (4) hours as needed. Indications: Arthritic Pain Vidhya Nguyễn MD 4/23/2019 Unknown time Active Yes   allopurinol (ZYLOPRIM) 100 mg tablet Take 1 Tab by mouth daily. Maximo Godoy MD 4/23/2019 Unknown time Active Yes   colchicine 0.6 mg tablet Take 0.6 mg by mouth daily as needed (gout flare). Provider, Historical 4/23/2019 Unknown time Active Yes   ibuprofen (MOTRIN) 600 mg tablet Take 1 Tab by mouth every six (6) hours as needed.  Take on full stomach Vidhya Nguyễn MD 4/23/2019 Unknown time Active Yes   levothyroxine (SYNTHROID) 88 mcg tablet Take 88 mcg by mouth Daily (before breakfast). Provider, Historical 4/23/2019 Unknown time Active Yes   lisinopril (PRINIVIL, ZESTRIL) 20 mg tablet Take 20 mg by mouth daily. Indications: hypertension Provider, Historical 4/23/2019 Unknown time Active Yes   melatonin 3 mg tablet Take 1 Tab by mouth nightly as needed. Vidhya Nguyễn MD 4/23/2019 Unknown time Active Yes   methadone (DOLOPHINE) 10 mg/mL solution Take 130 mg by mouth daily. Provider, Historical 4/23/2019 943am Active Yes           Med Note (Rochelle Franz   Wed Apr 24, 2019  8:58 AM) Dose confirmed with [facility] counseling phone number of [phone]    oxyCODONE-acetaminophen (PERCOCET) 5-325 mg per tablet Take 1 Tab by mouth every four (4) hours as needed. Max Daily Amount: 6 Tabs. Vidhya Nguyễn MD 4/23/2019 Unknown time Active Yes   senna-docusate (PERICOLACE) 8.6-50 mg per tablet Take 1 Tab by mouth two (2) times a day. Vidhya Nguyễn MD 4/23/2019 Unknown time Active Yes   tamsulosin (FLOMAX) 0.4 mg capsule Take 0.4 mg by mouth daily. Indications: bladder cancer Provider, Historical 4/23/2019 Unknown time Active Yes   varenicline (CHANTIX) 1 mg tablet Take 1 mg by mouth two (2) times daily (after meals).  Provider, Historical 4/23/2019 Unknown time Active Yes           Med Note (JEFFREY VELASQUEZ   Wed Apr 24, 2019  2:05 PM) Pt is out of refills                  [address] [place] [address]: [phone]

## 2019-04-24 NOTE — PROGRESS NOTES
[facility] [address]  [address] [facility] [address], [place], [address] [facility]  [phone]      Medical Progress Note      NAME: Jas Queen   :  1965  MRM:  [id number]    Date/Time: 2019        Assessment / Plan:     Left foot infection: purulent drainage noted. From pressure ulcer from post-op boot. Agree with MRI. Podiatry consulted. Noted prior cultures. GNRs, enterococcus / GPC clusters on prelim. Looks to be polymicrobial vs contaminant. Empiric dapto and Unasyn for now. ID consult      HTN: largely controlled. Cont lisinopril     Chronic pain syndrome: on methadone, confirmed by pharmacy     Gout: stable, on allopurinol      Bladder cancer: s/p surgery in 2018 at [place] [facility]. Stable. follow up outpatient      Hypothyroidism: TSH markedly elevated. Send FT4. Will need to confirm compliance of Synthroid     Hx of hemorrhagic stroke with left sided hemiparesis: Uses wheelchair and walker at baseline. PT/OT consult. Continue supportive care     Chronic obstructive pulmonary disease (HCC) (). Stable. PRN nebs     Hypokalemia (). Replete and monitor. Check Mg     Tobacco abuse (). Advise cessation        Total time spent: 35 minutes  Time spent in the care of this patient including reviewing records, discussing with nursing and/or other providers on the treatment team, obtaining history and examining the patient, and discussing treatment plans. Care Plan discussed with: Patient, Nursing Staff and >50% of time spent in counseling and coordination of care    Discussed:  Care Plan and D/C Planning    Prophylaxis:  Lovenox    Disposition:  TBD         Subjective:     Chief Complaint:  Follow up foot infection    Chart/notes/labs/studies reviewed, patient examined at bedside. Foot drainage, better. No bleeding. No fevers            Objective:       Vitals:        Last 24hrs VS reviewed since prior progress note.  Most recent are:    Visit Vitals  /85   Pulse 63   Temp 97.8 °F (36.6 °C)   Resp 16   Ht 6' 1\" (1.854 m)   Wt 108.9 kg (240 lb)   SpO2 96%   BMI 31.66 kg/m²     SpO2 Readings from Last 6 Encounters:   04/24/19 96%   10/12/18 96%   09/28/18 95%   08/03/18 100%   08/01/18 95%   06/08/18 97%            Intake/Output Summary (Last 24 hours) at 4/24/2019 1647  Last data filed at 4/24/2019 1500  Gross per 24 hour   Intake 1907.5 ml   Output 700 ml   Net 1207.5 ml          Exam:     Physical Exam:    Gen:  Unkempt. Chronically ill-appearing. NAD  HEENT:  Sclerae nonicteric, hearing intact to voice, mucous membranes moist  Neck:  Supple, without masses. Resp:  No accessory muscle use, CTAB without wheezes, rales, or rhonchi  Card: RRR, without m/r/g. No LE edema. Abd:  +bowel sounds, soft, NTTP, nondistended. No HSM. Neuro: Face symmetric, tongue midline, speech fluent, follows commands appropriately  Psych:  Alert, oriented x 3.  Fair insight  Skin: see picture, malodorous drainage (inferior left medial malleolus)                Medications Reviewed: (see below)    Lab Data Reviewed: (see below)    ______________________________________________________________________    Medications:     Current Facility-Administered Medications   Medication Dose Route Frequency    ketorolac (TORADOL) injection 30 mg  30 mg IntraVENous Q6H    allopurinol (ZYLOPRIM) tablet 100 mg  100 mg Oral DAILY    levothyroxine (SYNTHROID) tablet 88 mcg  88 mcg Oral ACB    lisinopril (PRINIVIL, ZESTRIL) tablet 20 mg  20 mg Oral DAILY    melatonin tablet 3 mg  3 mg Oral QHS PRN    tamsulosin (FLOMAX) capsule 0.4 mg  0.4 mg Oral DAILY    LORazepam (ATIVAN) injection 1 mg  1 mg IntraVENous ONCE PRN    LORazepam (ATIVAN) injection 1 mg  1 mg IntraVENous ONCE    [START ON 4/25/2019] methadone (DOLOPHINE) 10 mg/mL concentrated solution 130 mg  130 mg Oral DAILY    sodium chloride (NS) flush 5-40 mL  5-40 mL IntraVENous Q8H    sodium chloride (NS) flush 5-40 mL  5-40 mL IntraVENous PRN    0.9% sodium chloride infusion  75 mL/hr IntraVENous CONTINUOUS    acetaminophen (TYLENOL) tablet 650 mg  650 mg Oral Q4H PRN    naloxone (NARCAN) injection 0.4 mg  0.4 mg IntraVENous PRN    enoxaparin (LOVENOX) injection 40 mg  40 mg SubCUTAneous Q24H    ampicillin-sulbactam 3 g in 0.9% sodium chloride (MBP/ADV) 100 mL ADV  3 g IntraVENous Q6H            Lab Review:     Recent Labs     04/24/19  0655 04/23/19 2111   WBC 8.0 12.3*   HGB 11.3* 12.3   HCT 34.8* 37.2    232     Recent Labs     04/23/19  2111      K 2.9*      CO2 31   GLU 76   BUN 6   CREA 0.80   CA 9.0   ALB 4.5   SGOT 21   ALT 32     No components found for: GLPOC  No results for input(s): PH, PCO2, PO2, HCO3, FIO2 in the last 72 hours. No results for input(s): INR in the last 72 hours.     No lab exists for component: INREXT  Lab Results   Component Value Date/Time    Specimen Description: BLOOD 02/04/2010 10:55 PM    Specimen Description: BLOOD 02/12/2009 11:30 PM     Lab Results   Component Value Date/Time    Culture result: NO GROWTH AFTER 8 HOURS 04/23/2019 09:11 PM    Culture result: HEAVY POSSIBLE ENTEROCOCCUS SPECIES (A) 04/23/2019 09:11 PM    Culture result: HEAVY GRAM NEGATIVE RODS (A) 04/23/2019 09:11 PM              ___________________________________________________    Attending Physician: Burgess mAador MD

## 2019-04-24 NOTE — H&P
[facility]    Hospitalist Admission Note      NAME:  Jodie Noel   :   1965   MRN:  [id number]     PCP:  Sulma Penny MD     Date/Time:  2019 12:00 AM          Subjective:     CHIEF COMPLAINT: left foot ulcer     HISTORY OF PRESENT ILLNESS:     Mr. Kamilah Lambert is a 46 y.o.  male with a hx of chronic pain, bladder CA, left-sided hemiparesis following hemorrhagic stroke who presented to the Emergency Department complaining of left foot ulceration. Per patient, dropped HVAC unit on top of foot 8 months ago, has been getting wound care but initially placed in post-op boot which caused ulceration near medial malleolus. Home health RN evaluated wound today and concerned for infection 2/2 weeping wound, malodorous. Patient had apparently \"moving\" and had not changed dressing in 1 week. In ED, WBC elevated but otherwise unremarkable evaluation. We will admit for further management. Past Medical History:   Diagnosis Date    Aneurysm [facility])     (with stroke)    Bladder tumor     Cancer ([facility]     bladder CA    Chronic pain     Family history of bladder cancer     Gout     Hypercholesterolemia     Hypertension     Lesion of bladder     Seizures ([facility]     Stroke ([facility]     left sided weakness    Thromboembolus ([facility]     Thyroid disease     TIA (transient ischemic attack)         Past Surgical History:   Procedure Laterality Date    HX ORTHOPAEDIC      R arthroscopy    HX OTHER SURGICAL      x2 L foot    HX UROLOGICAL  2018    Cystoscopy, TURBT (greater than 5 cm resected) Dr. Cuca Montgomery, [facility].  KNEE ARTHROSCP HARV      left knee    TRANSURETHRAL RESEC BLADDER NECK  08/10/2018       Social History     Tobacco Use    Smoking status: Current Every Day Smoker     Packs/day: 1.00    Smokeless tobacco: Never Used   Substance Use Topics    Alcohol use:  Yes     Alcohol/week: 8.4 oz     Types: 14 Cans of beer per week     Comment: occasional        Family History   Problem Relation Age of Onset    Diabetes Father     Lung Disease Father     Diabetes Sister     Diabetes Brother     Cancer Paternal Grandfather         Bladder      Family hx cannot be fully assessed, due to the admitting conditions    Allergies   Allergen Reactions    Sulfamethoxazole-Trimethoprim Rash     L eye and L hand    Ciprofloxacin Hives     Per pcp records    Codeine Hives     Tolerates dilaudid, oxycodone    Cymbalta [Duloxetine] Other (comments)     Confusion and memory loss    Gabapentin Hives and Diarrhea    Lyrica [Pregabalin] Hives    Sulfa (Sulfonamide Antibiotics) Rash    Ultram [Tramadol] Hives        Prior to Admission medications    Medication Sig Start Date End Date Taking? Authorizing Provider   colchicine (COLCRYS) 0.6 mg tablet Take 1 Tab by mouth daily. For gout flare 10/12/18   Rafiq Alves MD   methadone (DOLOPHINE) 5 mg/5 mL oral solution Take 115 mL by mouth daily. Max Daily Amount: 115 mg. Last dose on Saturday 10/13/18 10/13/18   Rafiq Alves MD   acetaminophen (TYLENOL) 325 mg tablet Take 2 Tabs by mouth every four (4) hours as needed. Indications: Arthritic Pain 10/12/18   Rafiq Alves MD   amoxicillin-clavulanate (AUGMENTIN) 875-125 mg per tablet Take 1 Tab by mouth every twelve (12) hours. 10/12/18   Rafiq Alves MD   ibuprofen (MOTRIN) 600 mg tablet Take 1 Tab by mouth every six (6) hours as needed. Take on full stomach 10/12/18   Rafiq Alves MD   oxyCODONE-acetaminophen (PERCOCET) 5-325 mg per tablet Take 1 Tab by mouth every four (4) hours as needed. Max Daily Amount: 6 Tabs. 10/12/18   Rafiq Alves MD   senna-docusate (PERICOLACE) 8.6-50 mg per tablet Take 1 Tab by mouth two (2) times a day. 10/12/18   Rafiq Alves MD   melatonin 3 mg tablet Take 1 Tab by mouth nightly as needed. 10/12/18   Rafiq Alves MD   varenicline (CHANTIX) 1 mg tablet Take 1 mg by mouth two (2) times daily (after meals).     Provider, Historical   levothyroxine (SYNTHROID) 88 mcg tablet Take 88 mcg by mouth Daily (before breakfast). Provider, Historical   lisinopril (PRINIVIL, ZESTRIL) 20 mg tablet Take 20 mg by mouth daily. Indications: hypertension    Provider, Historical   tamsulosin (FLOMAX) 0.4 mg capsule Take 0.4 mg by mouth daily. Indications: bladder cancer    Provider, Historical   allopurinol (ZYLOPRIM) 100 mg tablet Take 1 Tab by mouth daily.  1/12/18   Sophie Smith MD       Review of Systems:  (bold if positive, if negative)    Gen:  Eyes:  ENT:  CVS:  Pulm:  GI:  :  MS:  PainSkin:  erythemawoundPsych:  Endo:  Hem:  Renal:  Neuro:        Objective:      VITALS:    Vital signs reviewed; most recent are:    Visit Vitals  /87   Pulse 71   Temp 99.1 °F (37.3 °C)   Resp 16   Ht 6' 1\" (1.854 m)   Wt 108.9 kg (240 lb)   SpO2 92%   BMI 31.66 kg/m²     SpO2 Readings from Last 6 Encounters:   04/23/19 92%   10/12/18 96%   09/28/18 95%   08/03/18 100%   08/01/18 95%   06/08/18 97%        No intake or output data in the 24 hours ending 04/24/19 0000     Exam:     Physical Exam:    Gen:  Well-developed, well-nourished, in no acute distress  HEENT:  Pink conjunctivae, PERRL, hearing intact to voice, moist mucous membranes  Neck:  Supple, without masses, thyroid non-tender  Resp:  No accessory muscle use, clear breath sounds without wheezes rales or rhonchi  Card:  No murmurs, normal S1, S2 without thrills, bruits or peripheral edema  Abd:  Soft, non-tender, non-distended, normoactive bowel sounds are present, no palpable organomegaly and no detectable hernias  Lymph:  No cervical or inguinal adenopathy  Musc:  No cyanosis or clubbing  Skin: Left foot with 4 cm ulceration over medial malleolus with thick, tan secretions and malodorous, well healing large ulceration on top of same foot   Neuro:  Cranial nerves are grossly intact, no focal motor weakness, follows commands appropriately  Psych:  Good insight, oriented to person, place and time, alert Labs:    Recent Labs     04/23/19 2111   WBC 12.3*   HGB 12.3   HCT 37.2        Recent Labs     04/23/19 2111      K 2.9*      CO2 31   GLU 76   BUN 6   CREA 0.80   CA 9.0   ALB 4.5   TBILI 0.4   SGOT 21   ALT 32     Lab Results   Component Value Date/Time    Glucose (POC) 150 (H) 03/19/2018 11:45 AM    Glucose (POC) 123 (H) 03/18/2018 08:55 PM     No results for input(s): PH, PCO2, PO2, HCO3, FIO2 in the last 72 hours. No results for input(s): INR in the last 72 hours. No lab exists for component: INREXT  All Micro Results     Procedure Component Value Units Date/Time    CULTURE, [place] STAIN [[id number]] Collected:  04/23/19 2111    Order Status:  Completed Specimen:  Wound from Foot Updated:  04/23/19 2329     Special Requests: NO SPECIAL REQUESTS        GRAM STAIN RARE WBCS SEEN         2+ GRAM NEGATIVE RODS               OCCASIONAL GRAM POSITIVE COCCI IN CLUSTERS            RARE GRAM POSITIVE RODS        Culture result: PENDING    CULTURE, WOUND [[id number]]     Order Status:  Sent Specimen:  Wound from [address] [place] [address], MRSA [[id number]]     Order Status:  Sent Specimen:  Nares     CULTURE, BLOOD [[id number]] Collected:  04/23/19 2111    Order Status:  Completed Specimen:  Blood Updated:  04/23/19 2244          I have reviewed previous records       Assessment and Plan:      Principal Problem:    Left foot infection. POA. NOT septic. ESR/CRP not really elevated. WBC mildly increased. Admit to medicine. Podiatry consult. Will get MRI foot to r/o osteo (doubt it with ESR/CRP where they are). Check wound culture. IV unasyn and doxycycline (claims allergy to vanc, TMP-SMX, does not warrant dapto). Check MRSA screen. IV toradol for pain, avoid IV opiates. Active Problems:    HTN. BP up slightly. Continue lisinopril and monitor BP     Chronic pain syndrome.  does not reflect what patient states he takes but reports he goes to methadone clinic daily for dispensing.  Pharmacy to confirm. Cont home methadone dose. IV toradol for acute pain, avoid IV opiates.     Gout. Stable, Allopurinol      Bladder cancer. s/p surgery in April 2018 at [facility]. Stable.      Hypothyroidism. Check TSH. Cont LT 4      Hx of hemorrhagic stroke with left sided hemiparesis. Uses wheelchair and walker at baseline. PT/OT consult. Continue supportive care    Chronic obstructive pulmonary disease ([facility] [address] (8/8/2018). Stable. PRN neb    Hypokalemia (8/8/2018). Replete PO and monitor    Tobacco abuse (8/8/2018). Counseled on cessation       Telemetry reviewed:   normal sinus rhythm    Risk of deterioration: high      Total time spent with patient: 79 Minutes I reviewed chart, notes, data and current medications in the medical record. I have examined and treated the patient at bedside during this period.                  Care Plan discussed with: Patient and Nursing Staff    Discussed:  Code Status, Care Plan and D/C Planning       ___________________________________________________    Attending Physician: Stephan Mayberry DO

## 2019-04-24 NOTE — ROUTINE PROCESS
TRANSFER - OUT REPORT:    Verbal report given to Shu Clay RN (name) on Jolie March  being transferred to 4th floor (unit) for routine progression of care       Report consisted of patients Situation, Background, Assessment and   Recommendations(SBAR). Information from the following report(s) SBAR, ED Summary, [facility] and Recent Results was reviewed with the receiving nurse. Lines:   Peripheral IV 04/23/19 Right Antecubital (Active)   Site Assessment Clean, dry, & intact 4/23/2019  9:32 PM   Phlebitis Assessment 0 4/23/2019  9:32 PM   Infiltration Assessment 0 4/23/2019  9:32 PM   Dressing Status Clean, dry, & intact; New 4/23/2019  9:32 PM   Hub Color/Line Status Pink 4/23/2019  9:32 PM        Opportunity for questions and clarification was provided.       Patient transported with:   [employer]

## 2019-04-24 NOTE — ED PROVIDER NOTES
Edward Day is a 46 y.o. male  who presents by EMS to ER with c/o Patient presents with:  Wound Check  Skin Problem  Patient presents with complaints of foot pain. Patient with history of chronic foot wound from injury 8 months ago when he dropped an HVAC on his foot. Patient has a wound care nurse come twice a week, today nurse said foot looked infected and patient needed to come to ED. Patient denies fever or chills. Patent denies history of diabetes. He specifically denies any fevers, chills, nausea, vomiting, chest pain, shortness of breath, headache, rash, diarrhea, abdominal pain, urinary/bowel changes, sweating or weight loss. PCP: Gisell Cat MD   PMHx significant for: Past Medical History:  No date: Aneurysm ([facility]      Comment:  (with stroke)  No date: Bladder tumor  No date: Cancer [facility])      Comment:  bladder CA  No date: Chronic pain  No date: Family history of bladder cancer  No date: Gout  No date: Hypercholesterolemia  No date: Hypertension  No date: Lesion of bladder  No date: Seizures ([facility]  2006: Stroke [facility])      Comment:  left sided weakness  No date: Thromboembolus ([facility]  No date: Thyroid disease  2012: TIA (transient ischemic attack)   PSHx significant for: Past Surgical History:  No date: HX ORTHOPAEDIC      Comment:  R arthroscopy  No date: HX OTHER SURGICAL      Comment:  x2 L foot  04/20/2018: HX UROLOGICAL      Comment:  Cystoscopy, TURBT (greater than 5 cm resected) Dr. Debbie Jara, [facility]. No date: KNEE ARTHROSCP HARV      Comment:  left knee  08/10/2018: TRANSURETHRAL RESEC BLADDER NECK  Social Hx: Tobacco use: Social History    Tobacco Use      Smoking status: Current Every Day Smoker        Packs/day: 1.00      Smokeless tobacco: Never Used  ; EtOH use: The patient states he drinks 0 per week.; Illicit Drug use:      Allergies:   -- Sulfamethoxazole-Trimethoprim -- Rash    --  L eye and L hand   -- Ciprofloxacin -- Hives    --  Per pcp records   -- Codeine -- Hives    --  Tolerates dilaudid, oxycodone   -- Cymbalta (Duloxetine) -- Other (comments)    --  Confusion and memory loss   -- Gabapentin -- Hives and Diarrhea   -- Lyrica (Pregabalin) -- Hives   -- Sulfa (Sulfonamide Antibiotics) -- Rash   -- Ultram (Tramadol) -- Hives    There are no other complaints, changes or physical findings at this time. Past Medical History:   Diagnosis Date    Aneurysm [facility])     (with stroke)    Bladder tumor     Cancer ([facility]     bladder CA    Chronic pain     Family history of bladder cancer     Gout     Hypercholesterolemia     Hypertension     Lesion of bladder     Seizures ([facility]     Stroke ([facility] 2006    left sided weakness    Thromboembolus ([facility]     Thyroid disease     TIA (transient ischemic attack) 2012       Past Surgical History:   Procedure Laterality Date    HX ORTHOPAEDIC      R arthroscopy    HX OTHER SURGICAL      x2 L foot    HX UROLOGICAL  04/20/2018    Cystoscopy, TURBT (greater than 5 cm resected) Dr. Charis Paniagua, [facility].  KNEE ARTHROSCP HARV      left knee    TRANSURETHRAL RESEC BLADDER NECK  08/10/2018         Family History:   Problem Relation Age of Onset    Diabetes Father     Lung Disease Father     Diabetes Sister     Diabetes Brother     Cancer Paternal Grandfather         Bladder       Social History     Socioeconomic History    Marital status:      Spouse name: Not on file    Number of children: Not on file    Years of education: Not on file    Highest education level: Not on file   Occupational History    Not on file   Social Needs    Financial resource strain: Not very hard    Food insecurity:     Worry: Never true     Inability: Never true    Transportation needs:     Medical: No     Non-medical: No   Tobacco Use    Smoking status: Current Every Day Smoker     Packs/day: 1.00    Smokeless tobacco: Never Used   Substance and Sexual Activity    Alcohol use:  Yes     Alcohol/week: 8.4 oz     Types: 14 Cans of beer per week     Comment: occasional    Drug use: Yes     Types: Prescription    Sexual activity: Yes   Lifestyle    Physical activity:     Days per week: 7 days     Minutes per session: 30 min    Stress: Not at all   Relationships    Social connections:     Talks on phone: Not on file     Gets together: Three times a week     Attends Rastafarian service: Patient refused     Active member of club or organization: Patient refused     Attends meetings of clubs or organizations: Never     Relationship status:     Intimate partner violence:     Fear of current or ex partner: No     Emotionally abused: No     Physically abused: No     Forced sexual activity: No   Other Topics Concern     Service No    Blood Transfusions No     Comment: refused to make decision    Caffeine Concern No    Occupational Exposure No    Hobby Hazards No    Sleep Concern No    Stress Concern No    Weight Concern No    Special Diet No    Back Care No    Exercise No    Bike Helmet No    Seat Belt Yes    Self-Exams No   Social History Narrative    60year old  male admitted for reported SI in the context of bladder CA, chronic pain, homelessness, and opiod dependence. Pt has been in multiple psychiatric admissions for the same compliants in the last 2 years,         ALLERGIES: Sulfamethoxazole-trimethoprim; Ciprofloxacin; Codeine; Cymbalta [duloxetine]; Gabapentin; Lyrica [pregabalin]; Sulfa (sulfonamide antibiotics); and Ultram [tramadol]    Review of Systems   Constitutional: Negative for activity change, appetite change, chills and fever. HENT: Negative for congestion and sore throat. Respiratory: Negative for cough and shortness of breath. Cardiovascular: Negative for chest pain. Gastrointestinal: Negative for abdominal pain, diarrhea, nausea and vomiting. Genitourinary: Negative for dysuria. Musculoskeletal: Negative for arthralgias and myalgias. Skin: Positive for wound.  Negative for color change. Neurological: Negative for dizziness. Psychiatric/Behavioral: The patient is not nervous/anxious. All other systems reviewed and are negative. Vitals:    04/23/19 1958   BP: 136/78   Pulse: 71   Resp: 16   Temp: 99.1 °F (37.3 °C)   SpO2: 94%   Weight: 108.9 kg (240 lb)   Height: 6' 1\" (1.854 m)            Physical Exam   Constitutional: He is oriented to person, place, and time. He appears well-developed and well-nourished. HENT:   Head: Normocephalic and atraumatic. Right Ear: External ear normal.   Left Ear: External ear normal.   Mouth/Throat: Oropharynx is clear and moist. No oropharyngeal exudate. Eyes: Pupils are equal, round, and reactive to light. Conjunctivae and EOM are normal. Right eye exhibits no discharge. Left eye exhibits no discharge. No scleral icterus. Neck: Normal range of motion. Neck supple. No tracheal deviation present. No thyromegaly present. Cardiovascular: Normal rate, regular rhythm, normal heart sounds and intact distal pulses. No murmur heard. Pulmonary/Chest: Effort normal and breath sounds normal. No respiratory distress. He has no wheezes. He has no rales. Abdominal: Soft. Bowel sounds are normal. He exhibits no distension. There is no tenderness. There is no rebound and no guarding. Musculoskeletal: Normal range of motion. He exhibits no edema or tenderness. Feet:    Left Lower Extremity is NVI. Pulses 2+ distally, capillary refill <3 seconds, sensation intact. Patient is able to move toes with out difficulty. Lymphadenopathy:     He has no cervical adenopathy. Neurological: He is alert and oriented to person, place, and time. No cranial nerve deficit. Coordination normal.   Skin: Skin is warm. No rash noted. No erythema. Psychiatric: He has a normal mood and affect. His behavior is normal. Judgment and thought content normal.   Nursing note and vitals reviewed.        MDM       Procedures      CONSULT NOTE:   10:41 PM  Shawna Rothman Niurka SALINAS spoke with Dr. Alex Maradiaga,   Specialty: Hospitalist  Discussed pt's hx, disposition, and available diagnostic and imaging results. Reviewed care plans. Consultant agrees with plans as outlined. Will see for admission. 12:49 AM  Patient is being admitted to the hospital.  The results of their tests and reasons for their admission have been discussed with them and/or available family. They convey agreement and understanding for the need to be admitted and for their admission diagnosis. Consultation has been made with the inpatient physician specialist for hospitalization. LABORATORY TESTS:  Recent Results (from the past 12 hour(s))   CBC WITH AUTOMATED DIFF    Collection Time: 04/23/19  9:11 PM   Result Value Ref Range    WBC 12.3 (H) 4.1 - 11.1 K/uL    RBC 4.20 4. 10 - 5.70 M/uL    HGB 12.3 12.1 - 17.0 g/dL    HCT 37.2 36.6 - 50.3 %    MCV 88.6 80.0 - 99.0 FL    MCH 29.3 26.0 - 34.0 PG    MCHC 33.1 30.0 - 36.5 g/dL    RDW 14.7 (H) 11.5 - 14.5 %    PLATELET 713 154 - 358 K/uL    MPV 10.2 8.9 - 12.9 FL    NRBC 0.0 0  WBC    ABSOLUTE NRBC 0.00 0.00 - 0.01 K/uL    NEUTROPHILS 57 32 - 75 %    LYMPHOCYTES 30 12 - 49 %    MONOCYTES 9 5 - 13 %    EOSINOPHILS 3 0 - 7 %    BASOPHILS 1 0 - 1 %    IMMATURE GRANULOCYTES 0 0.0 - 0.5 %    ABS. NEUTROPHILS 7.0 1.8 - 8.0 K/UL    ABS. LYMPHOCYTES 3.6 (H) 0.8 - 3.5 K/UL    ABS. MONOCYTES 1.1 (H) 0.0 - 1.0 K/UL    ABS. EOSINOPHILS 0.4 0.0 - 0.4 K/UL    ABS. BASOPHILS 0.1 0.0 - 0.1 K/UL    ABS. IMM.  GRANS. 0.0 0.00 - 0.04 K/UL    DF AUTOMATED     METABOLIC PANEL, COMPREHENSIVE    Collection Time: 04/23/19  9:11 PM   Result Value Ref Range    Sodium 136 136 - 145 mmol/L    Potassium 2.9 (L) 3.5 - 5.1 mmol/L    Chloride 102 97 - 108 mmol/L    CO2 31 21 - 32 mmol/L    Anion gap 3 (L) 5 - 15 mmol/L    Glucose 76 65 - 100 mg/dL    BUN 6 6 - 20 MG/DL    Creatinine 0.80 0.70 - 1.30 MG/DL    BUN/Creatinine ratio 8 (L) 12 - 20      GFR est AA >60 >60 ml/min/1.73m2 GFR est non-AA >60 >60 ml/min/1.73m2    Calcium 9.0 8.5 - 10.1 MG/DL    Bilirubin, total 0.4 0.2 - 1.0 MG/DL    ALT (SGPT) 32 12 - 78 U/L    AST (SGOT) 21 15 - 37 U/L    Alk. phosphatase 122 (H) 45 - 117 U/L    Protein, total 7.7 6.4 - 8.2 g/dL    Albumin 4.5 3.5 - 5.0 g/dL    Globulin 3.2 2.0 - 4.0 g/dL    A-G Ratio 1.4 1.1 - 2.2     SED RATE (ESR)    Collection Time: 04/23/19  9:11 PM   Result Value Ref Range    Sed rate, automated 12 0 - 20 mm/hr   C REACTIVE PROTEIN, QT    Collection Time: 04/23/19  9:11 PM   Result Value Ref Range    C-Reactive protein 0.99 (H) 0.00 - 0.60 mg/dL   CULTURE, WOUND W GRAM STAIN    Collection Time: 04/23/19  9:11 PM   Result Value Ref Range    Special Requests: NO SPECIAL REQUESTS      GRAM STAIN RARE WBCS SEEN      GRAM STAIN 2+ GRAM NEGATIVE RODS      GRAM STAIN OCCASIONAL GRAM POSITIVE COCCI IN CLUSTERS      GRAM STAIN RARE GRAM POSITIVE RODS      Culture result: PENDING    TSH 3RD GENERATION    Collection Time: 04/23/19  9:11 PM   Result Value Ref Range    TSH 21.30 (H) 0.36 - 3.74 uIU/mL   SAMPLES BEING HELD    Collection Time: 04/23/19  9:18 PM   Result Value Ref Range    SAMPLES BEING HELD SST.RED.GRN. JENNIE     COMMENT        Add-on orders for these samples will be processed based on acceptable specimen integrity and analyte stability, which may vary by analyte.        IMAGING RESULTS:  See chart    MEDICATIONS GIVEN:  Medications   sodium chloride (NS) flush 5-40 mL ( IntraVENous Canceled Entry 4/23/19 2301)   sodium chloride (NS) flush 5-40 mL (has no administration in time range)   0.9% sodium chloride infusion (75 mL/hr IntraVENous New Bag 4/24/19 0014)   acetaminophen (TYLENOL) tablet 650 mg (650 mg Oral Given 4/24/19 0005)   naloxone (NARCAN) injection 0.4 mg (has no administration in time range)   enoxaparin (LOVENOX) injection 40 mg (has no administration in time range)   doxycycline (VIBRA-TABS) tablet 100 mg (100 mg Oral Given 4/24/19 0036)   ampicillin-sulbactam 3 g in 0.9% sodium chloride (MBP/ADV) 100 mL ADV (3 g IntraVENous New Bag 4/24/19 0036)   ketorolac (TORADOL) injection 30 mg (30 mg IntraVENous Given 4/24/19 0014)   allopurinol (ZYLOPRIM) tablet 100 mg (has no administration in time range)   levothyroxine (SYNTHROID) tablet 88 mcg (has no administration in time range)   lisinopril (PRINIVIL, ZESTRIL) tablet 20 mg (has no administration in time range)   melatonin tablet 3 mg (has no administration in time range)   methadone (DOLOPHINE) 10 mg/mL concentrated solution 115 mg (has no administration in time range)   tamsulosin (FLOMAX) capsule 0.4 mg (has no administration in time range)   potassium chloride SR (KLOR-CON 10) tablet 40 mEq (40 mEq Oral Given 4/23/19 2250)   potassium chloride 10 mEq in 100 ml IVPB (10 mEq IntraVENous New Bag 4/23/19 2250)       IMPRESSION:  1. Wound infection    2. Hypokalemia        PLAN:  1.  Admit to hospital

## 2019-04-25 ENCOUNTER — APPOINTMENT (OUTPATIENT)
Dept: MRI IMAGING | Age: 54
DRG: 383 | End: 2019-04-25
Attending: INTERNAL MEDICINE
Payer: MEDICAID

## 2019-04-25 ENCOUNTER — APPOINTMENT (OUTPATIENT)
Dept: GENERAL RADIOLOGY | Age: 54
DRG: 383 | End: 2019-04-25
Attending: INTERNAL MEDICINE
Payer: MEDICAID

## 2019-04-25 ENCOUNTER — ANESTHESIA (OUTPATIENT)
Dept: SURGERY | Age: 54
DRG: 383 | End: 2019-04-25
Payer: MEDICAID

## 2019-04-25 ENCOUNTER — APPOINTMENT (OUTPATIENT)
Dept: CT IMAGING | Age: 54
DRG: 383 | End: 2019-04-25
Attending: INTERNAL MEDICINE
Payer: MEDICAID

## 2019-04-25 ENCOUNTER — ANESTHESIA EVENT (OUTPATIENT)
Dept: SURGERY | Age: 54
DRG: 383 | End: 2019-04-25
Payer: MEDICAID

## 2019-04-25 LAB
ALBUMIN SERPL-MCNC: 3.4 G/DL (ref 3.5–5)
ANION GAP SERPL CALC-SCNC: 6 MMOL/L (ref 5–15)
BASOPHILS # BLD: 0.1 K/UL (ref 0–0.1)
BASOPHILS NFR BLD: 1 % (ref 0–1)
BUN SERPL-MCNC: 10 MG/DL (ref 6–20)
BUN/CREAT SERPL: 16 (ref 12–20)
CALCIUM SERPL-MCNC: 8.6 MG/DL (ref 8.5–10.1)
CHLORIDE SERPL-SCNC: 106 MMOL/L (ref 97–108)
CK SERPL-CCNC: 60 U/L (ref 39–308)
CO2 SERPL-SCNC: 28 MMOL/L (ref 21–32)
CREAT SERPL-MCNC: 0.64 MG/DL (ref 0.7–1.3)
DIFFERENTIAL METHOD BLD: ABNORMAL
EOSINOPHIL # BLD: 0.5 K/UL (ref 0–0.4)
EOSINOPHIL NFR BLD: 7 % (ref 0–7)
ERYTHROCYTE [DISTWIDTH] IN BLOOD BY AUTOMATED COUNT: 14.6 % (ref 11.5–14.5)
GLUCOSE SERPL-MCNC: 118 MG/DL (ref 65–100)
HCT VFR BLD AUTO: 35.8 % (ref 36.6–50.3)
HGB BLD-MCNC: 11.4 G/DL (ref 12.1–17)
IMM GRANULOCYTES # BLD AUTO: 0 K/UL (ref 0–0.04)
IMM GRANULOCYTES NFR BLD AUTO: 0 % (ref 0–0.5)
LYMPHOCYTES # BLD: 2.1 K/UL (ref 0.8–3.5)
LYMPHOCYTES NFR BLD: 30 % (ref 12–49)
MAGNESIUM SERPL-MCNC: 1.8 MG/DL (ref 1.6–2.4)
MCH RBC QN AUTO: 29 PG (ref 26–34)
MCHC RBC AUTO-ENTMCNC: 31.8 G/DL (ref 30–36.5)
MCV RBC AUTO: 91.1 FL (ref 80–99)
MONOCYTES # BLD: 0.6 K/UL (ref 0–1)
MONOCYTES NFR BLD: 9 % (ref 5–13)
NEUTS SEG # BLD: 3.7 K/UL (ref 1.8–8)
NEUTS SEG NFR BLD: 53 % (ref 32–75)
NRBC # BLD: 0 K/UL (ref 0–0.01)
NRBC BLD-RTO: 0 PER 100 WBC
PHOSPHATE SERPL-MCNC: 3.3 MG/DL (ref 2.6–4.7)
PLATELET # BLD AUTO: 180 K/UL (ref 150–400)
PMV BLD AUTO: 11.3 FL (ref 8.9–12.9)
POTASSIUM SERPL-SCNC: 3.6 MMOL/L (ref 3.5–5.1)
RBC # BLD AUTO: 3.93 M/UL (ref 4.1–5.7)
SODIUM SERPL-SCNC: 140 MMOL/L (ref 136–145)
T4 FREE SERPL-MCNC: 0.7 NG/DL (ref 0.8–1.5)
WBC # BLD AUTO: 6.9 K/UL (ref 4.1–11.1)

## 2019-04-25 PROCEDURE — 76210000006 HC OR PH I REC 0.5 TO 1 HR: Performed by: PODIATRIST

## 2019-04-25 PROCEDURE — 74011636320 HC RX REV CODE- 636/320: Performed by: RADIOLOGY

## 2019-04-25 PROCEDURE — 84480 ASSAY TRIIODOTHYRONINE (T3): CPT

## 2019-04-25 PROCEDURE — 74011000258 HC RX REV CODE- 258: Performed by: PODIATRIST

## 2019-04-25 PROCEDURE — 77030020782 HC GWN BAIR PAWS FLX 3M -B

## 2019-04-25 PROCEDURE — 83735 ASSAY OF MAGNESIUM: CPT

## 2019-04-25 PROCEDURE — 84439 ASSAY OF FREE THYROXINE: CPT

## 2019-04-25 PROCEDURE — 74011250636 HC RX REV CODE- 250/636

## 2019-04-25 PROCEDURE — 65270000029 HC RM PRIVATE

## 2019-04-25 PROCEDURE — 88305 TISSUE EXAM BY PATHOLOGIST: CPT

## 2019-04-25 PROCEDURE — 77030020143 HC AIRWY LARYN INTUB CGAS -A: Performed by: NURSE ANESTHETIST, CERTIFIED REGISTERED

## 2019-04-25 PROCEDURE — 76937 US GUIDE VASCULAR ACCESS: CPT

## 2019-04-25 PROCEDURE — 76060000032 HC ANESTHESIA 0.5 TO 1 HR: Performed by: PODIATRIST

## 2019-04-25 PROCEDURE — 77030032490 HC SLV COMPR SCD KNE COVD -B

## 2019-04-25 PROCEDURE — 74011250637 HC RX REV CODE- 250/637: Performed by: INTERNAL MEDICINE

## 2019-04-25 PROCEDURE — 74011250636 HC RX REV CODE- 250/636: Performed by: PODIATRIST

## 2019-04-25 PROCEDURE — 85025 COMPLETE CBC W/AUTO DIFF WBC: CPT

## 2019-04-25 PROCEDURE — 74011250636 HC RX REV CODE- 250/636: Performed by: INTERNAL MEDICINE

## 2019-04-25 PROCEDURE — 74011000250 HC RX REV CODE- 250

## 2019-04-25 PROCEDURE — 82550 ASSAY OF CK (CPK): CPT

## 2019-04-25 PROCEDURE — 77030011267 HC ELECTRD BLD COVD -A: Performed by: PODIATRIST

## 2019-04-25 PROCEDURE — 77030011256 HC DRSG MEPILEX <16IN NO BORD MOLN -A

## 2019-04-25 PROCEDURE — 76010000138 HC OR TIME 0.5 TO 1 HR: Performed by: PODIATRIST

## 2019-04-25 PROCEDURE — 73701 CT LOWER EXTREMITY W/DYE: CPT

## 2019-04-25 PROCEDURE — 80069 RENAL FUNCTION PANEL: CPT

## 2019-04-25 PROCEDURE — 77030028224 HC PDNG CST BSNM -A: Performed by: PODIATRIST

## 2019-04-25 PROCEDURE — 77030018786 HC NDL GD F/USND BARD -B

## 2019-04-25 PROCEDURE — 0JBR0ZZ EXCISION OF LEFT FOOT SUBCUTANEOUS TISSUE AND FASCIA, OPEN APPROACH: ICD-10-PCS | Performed by: PODIATRIST

## 2019-04-25 PROCEDURE — 74011000258 HC RX REV CODE- 258: Performed by: INTERNAL MEDICINE

## 2019-04-25 PROCEDURE — 74011250637 HC RX REV CODE- 250/637: Performed by: PODIATRIST

## 2019-04-25 PROCEDURE — 77030011640 HC PAD GRND REM COVD -A: Performed by: PODIATRIST

## 2019-04-25 PROCEDURE — 0HBNXZX EXCISION OF LEFT FOOT SKIN, EXTERNAL APPROACH, DIAGNOSTIC: ICD-10-PCS | Performed by: PODIATRIST

## 2019-04-25 PROCEDURE — 88312 SPECIAL STAINS GROUP 1: CPT

## 2019-04-25 PROCEDURE — 77030018836 HC SOL IRR NACL ICUM -A: Performed by: PODIATRIST

## 2019-04-25 PROCEDURE — C1751 CATH, INF, PER/CENT/MIDLINE: HCPCS

## 2019-04-25 PROCEDURE — 36415 COLL VENOUS BLD VENIPUNCTURE: CPT

## 2019-04-25 RX ORDER — SODIUM CHLORIDE, SODIUM LACTATE, POTASSIUM CHLORIDE, CALCIUM CHLORIDE 600; 310; 30; 20 MG/100ML; MG/100ML; MG/100ML; MG/100ML
100 INJECTION, SOLUTION INTRAVENOUS CONTINUOUS
Status: DISPENSED | OUTPATIENT
Start: 2019-04-25 | End: 2019-04-26

## 2019-04-25 RX ORDER — KETOROLAC TROMETHAMINE 10 MG/1
10 TABLET, FILM COATED ORAL ONCE
Status: COMPLETED | OUTPATIENT
Start: 2019-04-25 | End: 2019-04-25

## 2019-04-25 RX ORDER — AMLODIPINE BESYLATE 5 MG/1
5 TABLET ORAL DAILY
Status: DISCONTINUED | OUTPATIENT
Start: 2019-04-25 | End: 2019-04-27

## 2019-04-25 RX ORDER — HYDRALAZINE HYDROCHLORIDE 20 MG/ML
10 INJECTION INTRAMUSCULAR; INTRAVENOUS
Status: DISCONTINUED | OUTPATIENT
Start: 2019-04-25 | End: 2019-05-10 | Stop reason: HOSPADM

## 2019-04-25 RX ORDER — FENTANYL CITRATE 50 UG/ML
INJECTION, SOLUTION INTRAMUSCULAR; INTRAVENOUS AS NEEDED
Status: DISCONTINUED | OUTPATIENT
Start: 2019-04-25 | End: 2019-04-25 | Stop reason: HOSPADM

## 2019-04-25 RX ORDER — ROPIVACAINE HYDROCHLORIDE 5 MG/ML
INJECTION, SOLUTION EPIDURAL; INFILTRATION; PERINEURAL AS NEEDED
Status: DISCONTINUED | OUTPATIENT
Start: 2019-04-25 | End: 2019-04-25 | Stop reason: HOSPADM

## 2019-04-25 RX ORDER — MIDAZOLAM HYDROCHLORIDE 1 MG/ML
INJECTION, SOLUTION INTRAMUSCULAR; INTRAVENOUS AS NEEDED
Status: DISCONTINUED | OUTPATIENT
Start: 2019-04-25 | End: 2019-04-25 | Stop reason: HOSPADM

## 2019-04-25 RX ORDER — OXYCODONE HYDROCHLORIDE 5 MG/1
10 TABLET ORAL
Status: DISCONTINUED | OUTPATIENT
Start: 2019-04-25 | End: 2019-04-28

## 2019-04-25 RX ORDER — SODIUM CHLORIDE, SODIUM LACTATE, POTASSIUM CHLORIDE, CALCIUM CHLORIDE 600; 310; 30; 20 MG/100ML; MG/100ML; MG/100ML; MG/100ML
100 INJECTION, SOLUTION INTRAVENOUS CONTINUOUS
Status: DISCONTINUED | OUTPATIENT
Start: 2019-04-25 | End: 2019-04-25 | Stop reason: HOSPADM

## 2019-04-25 RX ORDER — LORAZEPAM 2 MG/ML
2 INJECTION INTRAMUSCULAR
Status: COMPLETED | OUTPATIENT
Start: 2019-04-25 | End: 2019-04-25

## 2019-04-25 RX ORDER — LIDOCAINE HYDROCHLORIDE 20 MG/ML
INJECTION, SOLUTION EPIDURAL; INFILTRATION; INTRACAUDAL; PERINEURAL AS NEEDED
Status: DISCONTINUED | OUTPATIENT
Start: 2019-04-25 | End: 2019-04-25 | Stop reason: HOSPADM

## 2019-04-25 RX ORDER — LIDOCAINE HYDROCHLORIDE 10 MG/ML
0.1 INJECTION, SOLUTION EPIDURAL; INFILTRATION; INTRACAUDAL; PERINEURAL AS NEEDED
Status: DISCONTINUED | OUTPATIENT
Start: 2019-04-25 | End: 2019-04-25 | Stop reason: HOSPADM

## 2019-04-25 RX ORDER — ROPIVACAINE HYDROCHLORIDE 5 MG/ML
INJECTION, SOLUTION EPIDURAL; INFILTRATION; PERINEURAL
Status: SHIPPED | OUTPATIENT
Start: 2019-04-25 | End: 2019-04-25

## 2019-04-25 RX ORDER — PROPOFOL 10 MG/ML
INJECTION, EMULSION INTRAVENOUS AS NEEDED
Status: DISCONTINUED | OUTPATIENT
Start: 2019-04-25 | End: 2019-04-25 | Stop reason: HOSPADM

## 2019-04-25 RX ORDER — HYDROMORPHONE HYDROCHLORIDE 1 MG/ML
.25-1 INJECTION, SOLUTION INTRAMUSCULAR; INTRAVENOUS; SUBCUTANEOUS
Status: DISCONTINUED | OUTPATIENT
Start: 2019-04-25 | End: 2019-04-25 | Stop reason: HOSPADM

## 2019-04-25 RX ADMIN — FENTANYL CITRATE 50 MCG: 50 INJECTION, SOLUTION INTRAMUSCULAR; INTRAVENOUS at 17:10

## 2019-04-25 RX ADMIN — IOPAMIDOL 100 ML: 612 INJECTION, SOLUTION INTRAVENOUS at 16:07

## 2019-04-25 RX ADMIN — AMLODIPINE BESYLATE 5 MG: 5 TABLET ORAL at 11:16

## 2019-04-25 RX ADMIN — LISINOPRIL 20 MG: 20 TABLET ORAL at 09:36

## 2019-04-25 RX ADMIN — ACETAMINOPHEN 650 MG: 325 TABLET ORAL at 04:26

## 2019-04-25 RX ADMIN — METHADONE HYDROCHLORIDE 130 MG: 10 CONCENTRATE ORAL at 09:37

## 2019-04-25 RX ADMIN — Medication 10 ML: at 07:37

## 2019-04-25 RX ADMIN — ROPIVACAINE HYDROCHLORIDE 10 ML: 5 INJECTION, SOLUTION EPIDURAL; INFILTRATION; PERINEURAL at 17:15

## 2019-04-25 RX ADMIN — ROPIVACAINE HYDROCHLORIDE 30 ML: 5 INJECTION, SOLUTION EPIDURAL; INFILTRATION; PERINEURAL at 17:12

## 2019-04-25 RX ADMIN — PROPOFOL 150 MG: 10 INJECTION, EMULSION INTRAVENOUS at 17:21

## 2019-04-25 RX ADMIN — MIDAZOLAM HYDROCHLORIDE 2 MG: 1 INJECTION, SOLUTION INTRAMUSCULAR; INTRAVENOUS at 17:10

## 2019-04-25 RX ADMIN — OXYCODONE HYDROCHLORIDE 10 MG: 5 TABLET ORAL at 21:16

## 2019-04-25 RX ADMIN — CEFEPIME 2 G: 2 INJECTION, POWDER, FOR SOLUTION INTRAVENOUS at 21:16

## 2019-04-25 RX ADMIN — LORAZEPAM 2 MG: 2 INJECTION INTRAMUSCULAR; INTRAVENOUS at 13:25

## 2019-04-25 RX ADMIN — Medication 10 ML: at 21:17

## 2019-04-25 RX ADMIN — ACETAMINOPHEN 650 MG: 325 TABLET ORAL at 11:16

## 2019-04-25 RX ADMIN — KETOROLAC TROMETHAMINE 10 MG: 10 TABLET, FILM COATED ORAL at 07:37

## 2019-04-25 RX ADMIN — DAPTOMYCIN 400 MG: 500 INJECTION, POWDER, LYOPHILIZED, FOR SOLUTION INTRAVENOUS at 17:37

## 2019-04-25 RX ADMIN — ALLOPURINOL 100 MG: 100 TABLET ORAL at 09:36

## 2019-04-25 RX ADMIN — LEVOTHYROXINE SODIUM 88 MCG: 88 TABLET ORAL at 07:37

## 2019-04-25 RX ADMIN — KETOROLAC TROMETHAMINE 30 MG: 30 INJECTION, SOLUTION INTRAMUSCULAR at 13:26

## 2019-04-25 RX ADMIN — TAMSULOSIN HYDROCHLORIDE 0.4 MG: 0.4 CAPSULE ORAL at 09:36

## 2019-04-25 RX ADMIN — KETOROLAC TROMETHAMINE 30 MG: 30 INJECTION, SOLUTION INTRAMUSCULAR at 23:55

## 2019-04-25 RX ADMIN — ENOXAPARIN SODIUM 40 MG: 40 INJECTION SUBCUTANEOUS at 09:36

## 2019-04-25 RX ADMIN — OXYCODONE HYDROCHLORIDE 10 MG: 5 TABLET ORAL at 11:16

## 2019-04-25 RX ADMIN — SODIUM CHLORIDE, SODIUM LACTATE, POTASSIUM CHLORIDE, AND CALCIUM CHLORIDE 100 ML/HR: 600; 310; 30; 20 INJECTION, SOLUTION INTRAVENOUS at 21:24

## 2019-04-25 RX ADMIN — MELATONIN TAB 3 MG 3 MG: 3 TAB at 04:26

## 2019-04-25 RX ADMIN — LIDOCAINE HYDROCHLORIDE 80 MG: 20 INJECTION, SOLUTION EPIDURAL; INFILTRATION; INTRACAUDAL; PERINEURAL at 17:21

## 2019-04-25 RX ADMIN — CEFEPIME 2 G: 2 INJECTION, POWDER, FOR SOLUTION INTRAVENOUS at 12:46
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
## 2019-04-25 NOTE — CONSULTS
[facility] Podiatric Surgery  H & P Note    Date: April 24, 2019  Patient: Lakshmi Gama  MR #: [id number]  [facility] #: [id number]  Attending/Admitting Physician: Dr Eugene Lucas MD    Reason for Consult:  Dr Eugene Lucas MD asked me to see Lakshmi Gama for evaluation and treatment of left foot wounds and cellulits    History of Present Illness:  Patient is a 46 y.o. male admitted for left lower extremity ulcerations and cellulitis. Last year had an injury resulting in wound. Patient has been evaluated and treated by multiple specialists without resolution of the wounds. Patient has been started on empiric doxycycline and unasyn. He is afebrile and the leukocytosis has resolved. He has chronic pain due to brain aneurysm. Patient has refused MRI    ROS:   Constitutional: Negative for fever, chills, weight loss. Positive for fatigue  HENT: Negative for nosebleeds and congestion. Eyes: Negative for blurred vision and double vision. Respiratory: Negative for cough, shortness of breath and wheezing. Cardiovascular: Negative for chest pain, palpitations, claudication. Positive for leg swelling  Gastrointestinal: Negative for heartburn, nausea, vomiting, abdominal pain and diarrhea. Genitourinary: Negative for dysuria and urgency. Musculoskeletal: positive for weakness and pain  Skin: positive for wounds  Neurological: Negative for dizziness. Positive for weakness  Endo/Heme/Allergies: Negative for environmental allergies. Does not bruise/bleed easily. 2  Psychiatric/Behavioral: positive for depression    Blood pressure 163/89, pulse (!) 58, temperature 98.2 °F (36.8 °C), resp. rate 18, height 6' 1\" (1.854 m), weight 108.9 kg (240 lb), SpO2 95 %.     Intake/Output Summary (Last 24 hours) at 4/24/2019 2133  Last data filed at 4/24/2019 1500  Gross per 24 hour   Intake 1907.5 ml   Output 700 ml   Net 1207.5 ml         Medical History:  Past Medical History:   Diagnosis Date    Aneurysm ([address]     (with stroke)  Bladder tumor     Cancer [facility])     bladder CA    Chronic pain     Family history of bladder cancer     Gout     Hypercholesterolemia     Hypertension     Lesion of bladder     Se[facility]ures [facility]     Stroke ([facility] 2006    left sided weakness    Thromboembolus ([facility]     Thyroid disease     TIA (transient ischemic attack) 2012     Past Surgical History:   Procedure Laterality Date    HX ORTHOPAEDIC      R arthroscopy    HX OTHER SURGICAL      x2 L foot    HX UROLOGICAL  04/20/2018    Cystoscopy, TURBT (greater than 5 cm resected) Dr. [facility]o [facility], [facility].     KNEE ARTHROSCP HARV      left knee    TRANSURETHRAL RESEC BLADDER NECK  08/10/2018     [unfilled]  Allergies   Allergen Reactions    Sulfamethoxazole-Trimethoprim Rash     L eye and L hand    Ciprofloxacin Hives     Per pcp records    Codeine Hives     Tolerates dilaudid, oxycodone    Cymbalta [Duloxetine] Other (comments)     Confusion and memory loss    Gabapentin Hives and Diarrhea    Lyrica [Pregabalin] Hives    Sulfa (Sulfonamide Antibiotics) Rash    Ultram [Tramadol] Hives    Vancomycin Shortness of Breath     Family History   Problem Relation Age of Onset    Diabetes Father     Lung Disease Father     Diabetes Sister     Diabetes Brother     Cancer Paternal Grandfather         Bladder     Social History     Tobacco Use   Smoking Status Current Every Day Smoker    Packs/day: 1.00   Smokeless Tobacco Never Used     Social History     Substance and Sexual Activity   Drug Use Yes    Types: Prescription     Social History     Substance and Sexual Activity   Alcohol Use Yes    Alcohol/week: 8.4 oz    Types: 14 Cans of beer per week    Comment: occasional       Labs:  Lab Results   Component Value Date/Time    WBC 8.0 04/24/2019 06:55 AM    HGB 11.3 (L) 04/24/2019 06:55 AM    HCT 34.8 (L) 04/24/2019 06:55 AM    MCV 90.6 04/24/2019 06:55 AM    PLATELET 123 54/66/6313 06:55 AM     Lab Results   Component Value Date/Time    Sodium 136 04/23/2019 09:11 PM    Potassium 2.9 (L) 04/23/2019 09:11 PM    Chloride 102 04/23/2019 09:11 PM    CO2 31 04/23/2019 09:11 PM    Phosphorus 4.4 03/14/2018 06:00 AM    BUN 6 04/23/2019 09:11 PM    Calcium 9.0 04/23/2019 09:11 PM      Lab Results   Component Value Date/Time    Glucose 76 04/23/2019 09:11 PM     Lab Results   Component Value Date/Time    INR 1.1 12/21/2017 11:28 AM    No components found for: PTT  Recent Labs     04/23/19  2111   GLU 76       Physical Exam:  General appearance: alert, cooperative, no distress, appears stated age    Lower Extremity Exam:  Vascular:    Dorsalis Pedis Pulse: present  Posterior Tibialis Pulse: present  Popliteal and Femoral Pulses: present  Skin Temp: warm to warm right and left lower extremities legs to toes  Extremity Edema: +2 pitting with chronic hemosiderin deposition  Varicosities: present    Neurological:  Deep Tendon Reflexes of Achilles and Patellar: diminished but intact right. Diminished left  Epicritic and Protective Sensations: absent   Deep Pain Response: present    Dermatological:  Toenails: mycotic 1-5 right and left foot  Ulceration:  Ulceration present to dorsal left foot   Measures 2cm x 4cm x0.2cm  Granular wound bed noted. Erythema noted ludwig-wound. Ulceration noted to medial left heel  Fibrotic wound bed with slough and malodor noted  Erythema and edema noted to wound    Rash: absent    Musculoskeletal:  Gait and Station: antalgic and foot drop    Impression:  1. Ulcerations x2 left lower extremity  2. Cellulitis left lower extremity  3. Non-compliance with care plan  4. Chronic pain    Plan:  1. The nature of the finding, probable diagnosis and likely treatment was thoroughly discussed with the patient. The options, risks, complications, alternative treatment as well as some of the differential diagnosis was discussed. The patient was thoroughly informed and all questions were answered.  the patient indicated understanding and satisfaction with the discussion. 2.   Difficult to ascertain whether non-healing wounds are due to patient compliance, pathology, or treatment. Likely is due to edema and venous insufficiency but differential includes pyoderma gangrenosum based on appearance and chronicity with pain. May have a . I have discussed and recommended surgical debridement with biopsy of the wounds in the OR. NPO and consent ordered. Would prefer to have MRI completed but low suspicion for osteomyelitis as the radiographs are negative and sed rate is low. I have discussed the procedure at length with patient including expected post operative course, risks, alternatives, and possible complications. No guarantees given and patient has elected to proceed. Will proceed tomorrow evening. Thanks for calling. Will follow. Brooke Branham.  JENAE Kaur [address] [facility]  Podiatric Surgeon  Board Certified Wound Specialist  [phone] cell  4/24/2019  9:33 PM

## 2019-04-25 NOTE — PROGRESS NOTES
Attempted MRI. Pt medicated. Pt was very agitated in MRI room, could not follow instructions, multiple attempts to get images. Pt moving, uncooperative, and became verbally abusive to techs.

## 2019-04-25 NOTE — PROGRESS NOTES
MIDLINE PLACEMENT NOTE  4 FR Single Lumen POWER MIDLINE    Midline catheters are NOT central lines. Approved midline products are peripheral infusion devices with the tip terminating in the arm at or below the axillary vein, distal to the shoulder. The tip DOES NOT ENTER THE CENTRAL VASCULATURE. A Midline is for reliable short term (less than 30 days) vascular access. PRE-PROCEDURE VERIFICATION  Correct Procedure: yes  Correct Site:  yes  Temperature: Temp: 98.2 °F (36.8 °C), Temperature Source: Temp Source: Oral  Recent Labs     04/25/19  0510   BUN 10   CREA 0.64*      WBC 6.9     Allergies: Sulfamethoxazole-trimethoprim; Ciprofloxacin; Codeine; Cymbalta [duloxetine]; Gabapentin; Lyrica [pregabalin]; Sulfa (sulfonamide antibiotics); Ultram [tramadol]; and Vancomycin  Education materials for Midline Care given: yes. See Patient Education activity for further details. Midline Booklet placed at bedside: yes    Midline Catheter Maintenance: For complete information reference Policy # [id number]    A. Dressing Changes       1. Assess the dressing in the first 24 hours for accumulation of blood, fluid or moisture beneath the dressing. During dressing changes, assess the external length of the catheter to determine if migration of the catheter has occurred. Periodically confirm catheter placement, tip location, patency and security of dressing. Dressing changes should be done every 7 days, and PRN using sterile technique if integrity of dressing is compromised. Apply new dressing per hospital policy. Caution: Do not use scissors to remove dressing to minimize the risk of cutting the  Catheter. B. Flushing       1. Flush each lumen of the catheter with 10 mL of sterile saline every 12 hours or after each use. In addition, lock each lumen of the catheter with sterile saline.    Note: Flush with 20 mL of sterile saline after blood therapy   Caution: DO NOT USE A SYRINGE SMALLER THAN 10 mL TO FLUSH AND CONFIRM PATENCY. Patency should be assessed with a 10 mL or larger syringe and  sterile saline. Upon confirmation of patency, administration of medication should be  given in a syringe appropriately sized for the dose. Do not infuse against resistance. C. Blood Draws:        1. Stop infusion, draw off and waste 10 ml of blood. Draw sample with 10 mL syringe or          greater. DO NOT USE VACUTAINER . Transfer with appropriate device to lab tubes. Flush        with 20 ml NS.  D. Occluded or Partially Occluded Catheter       1. Catheters that present resistance to flushing and aspiration may be partially or completely  occluded. Do not flush against resistance. PROCEDURE DETAIL:    Verbal consent was obtained and all questions were answered related to risks and benefits. A Midline was inserted as a sterile procedure using ultrasound and Modified Seldinger Technique for vascular access. The following documentation is in addition to the Midline properties in the lines/airways Doc Flow Sheet:  Lot #: [id number]  Lidocaine 1% administered intradermally :yes  Internal Catheter Total Length: 16 (cm)   External catheter length: 0 (cm)  Vein Selection for Midline:right cephalic  Sterile CVC Insertion Bundle was used to place line yes  Complication Related to Insertion:no    Prior to use, line patency MUST be verified by flushing at least a 10 mL syringe. Line should flush easily without resistance, and withdrawal of brisk blood return to verify patency.     Line is okay to use: yes        (Remove Midline by:  5/24/19)  Chapito Mendoza RN

## 2019-04-25 NOTE — OP NOTES
Operative Report    Patient: Delmar Bob MRN: [id number]  SSN: [id number]    YOB: 1965  Age: 46 y.o. Sex: male       Date of Surgery: 4/25/2019     Preoperative Diagnosis: WOUND LEFT FOOT     Postoperative Diagnosis: * No post-op diagnosis entered *     Surgeon(s) and Role:     * Katie Kaur DPM - Primary    Anesthesia: MAC     Procedure: Procedure(s):  IRRIGATION AND DEBRIDEMENT LEFT FOOT, BIOPSY LEFT FOOT (POP/SAPH BLOCK)     Procedure in Detail:Patient is a 60-year-old male who is admitted for worsening cellulitis and ulceration of the left lower extremity. He has seen multiple specialists over the last two years without resolution of his wounds. Patient has been historically non-compliant. Currently there is infection to the left foot and ankle with slough to the wound. I recommended wound debridement with wound biopsy to rule out abscess and other wound pathologies including pyoderma gangrenosum. I discussed the procedure at length including the expected post operative course, risks, alternatives, and possible complications. No guarantees were given and the patient elected to proceed. This 60-year-old male was brought the operating room and placed supine on the operating room table. After adequate IV sedation, a local infiltrated block was performed to the left foot using 0.5% Marcaine plane. Next the left lower extremity was prepped and draped in usual aseptic fashion. Attention was directed to the medial left heel where an ulceration was present measuring 4.5 cm x 4.5 cm x 0.2 cm with Slough and surrounding erythema. Excisional debridement was performed into and through subcutaneous tissue with removal of all devitalized tissue using forceps and a 15 blade. A curette was also used. The plantar component of this wound was biopsied and sent for pathology. Post debridement measurement of 4.6 cm x 4.6 cm x 0.5 cm.     Attention was directed to the medial left heel where an ulceration was present measuring 2.6 cm x 2.6 cm x 0.2 cm with Slough and surrounding erythema. Excisional debridement was performed into and through subcutaneous tissue with removal of all devitalized tissue using forceps and a 15 blade. A curette was also used. The plantar component of this wound was biopsied and sent for pathology. Post debridement measurement of 2.8 cm x 2.8 cm x 0.5 cm. Patient tolerated procedure and anesthesia well. Compression dressing applied and patient was transferred back to the PACU for postoperative monitoring. From there he will be transferred to the floor for further medical and surgical management. Estimated Blood Loss:   1mL    Tourniquet Time: n/a      Implants: n/a            Specimens:   ID Type Source Tests Collected by Time Destination   1 : left foot skin biopsy Preservative Foot, left  [place], [facility] JENAE Gutierrez 4/25/2019 1733 Pathology           Drains: None                Complications: None    Counts: Sponge and needle counts were correct times two.     Signed By:  Damien Bauer DPM     April 25, 2019

## 2019-04-25 NOTE — PROGRESS NOTES
[facility]  [address] [facility], [address] [place] [address]  [phone]      Medical Progress Note      NAME: Neto Booker   :  1965  MRM:  [id number]    Date/Time: 2019        Assessment / Plan:     Left foot infection: From pressure ulcer from post-op boot. Case discussed with podiatry and ID. Planning I&D. Several attempt at MRI have not been successful as pt is uncooperative, despite sedation/pain control. Will try CT at ID's suggestion. Podiatry consulted. Noted prior cultures including Pseudomonas and alcaligenes. On Dapto and cefepime. Appreciate ID. Follow wound cultures; GNRs, enterococcus / GPC clusters on prelim     HTN: largely controlled. Cont lisinopril     Chronic pain syndrome: on methadone, confirmed by pharmacy     Gout: stable, on allopurinol      Bladder cancer: s/p surgery in 2018 at [facility]. Stable. follow up outpatient      Hypothyroidism: TSH markedly elevated. Send FT4. Doubtful that he has been compliant with Synthroid. Will further discuss with patient. Continue Synthroid at current dose for now. Regardless, will need to repeat TFTs in 4 weeks     Hx of hemorrhagic stroke with left sided hemiparesis: Uses wheelchair and walker at baseline. PT/OT consult. Continue supportive care     Chronic obstructive pulmonary disease (HCC) (). Stable. PRN nebs     Hypokalemia (). Replete and monitor. Check Mg     Tobacco abuse (). Advise cessation        Total time spent: 35 minutes  Time spent in the care of this patient including reviewing records, discussing with nursing and/or other providers on the treatment team, obtaining history and examining the patient, and discussing treatment plans.                   Care Plan discussed with: Patient, Nursing Staff and >50% of time spent in counseling and coordination of care    Discussed:  Care Plan and D/C Planning    Prophylaxis:  Lovenox    Disposition:  TBD         Subjective:     Chief Complaint:  Follow up foot infection    Chart/notes/labs/studies reviewed, patient examined at bedside. Reports pain in foot. No fevers. Soft loose stools but no diarrhea. Objective:       Vitals:        Last 24hrs VS reviewed since prior progress note. Most recent are:    Visit Vitals  /86 (BP 1 Location: Right arm, BP Patient Position: Sitting)   Pulse (!) 56   Temp 98.2 °F (36.8 °C)   Resp 18   Ht 6' 1\" (1.854 m)   Wt 108.9 kg (240 lb)   SpO2 93%   BMI 31.66 kg/m²     SpO2 Readings from Last 6 Encounters:   04/25/19 93%   10/12/18 96%   09/28/18 95%   08/03/18 100%   08/01/18 95%   06/08/18 97%            Intake/Output Summary (Last 24 hours) at 4/25/2019 1443  Last data filed at 4/24/2019 1500  Gross per 24 hour   Intake 1380 ml   Output --   Net 1380 ml          Exam:     Physical Exam:    Gen:  Unkempt. Chronically ill-appearing. NAD  HEENT:  Sclerae nonicteric, hearing intact to voice, mucous membranes moist  Neck:  Supple, without masses. Resp:  No accessory muscle use, CTAB without wheezes, rales, or rhonchi  Card: RRR, without m/r/g. No LE edema. Abd:  +bowel sounds, soft, NTTP, nondistended. No HSM. Neuro: Face symmetric, tongue midline, speech fluent, follows commands appropriately  Psych:  Alert, oriented x 3.  Poor insight  Skin: dressing C/D/I       Medications Reviewed: (see below)    Lab Data Reviewed: (see below)    ______________________________________________________________________    Medications:     Current Facility-Administered Medications   Medication Dose Route Frequency    amLODIPine (NORVASC) tablet 5 mg  5 mg Oral DAILY    oxyCODONE IR (ROXICODONE) tablet 10 mg  10 mg Oral Q6H PRN    hydrALAZINE (APRESOLINE) 20 mg/mL injection 10 mg  10 mg IntraVENous Q6H PRN    cefepime (MAXIPIME) 2 g in 0.9% sodium chloride (MBP/ADV) 100 mL  2 g IntraVENous Q8H    ketorolac (TORADOL) injection 30 mg  30 mg IntraVENous Q6H    allopurinol (ZYLOPRIM) tablet 100 mg  100 mg Oral DAILY    levothyroxine (SYNTHROID) tablet 88 mcg  88 mcg Oral ACB    lisinopril (PRINIVIL, ZESTRIL) tablet 20 mg  20 mg Oral DAILY    melatonin tablet 3 mg  3 mg Oral QHS PRN    tamsulosin (FLOMAX) capsule 0.4 mg  0.4 mg Oral DAILY    methadone (DOLOPHINE) 10 mg/mL concentrated solution 130 mg  130 mg Oral DAILY    DAPTOmycin (CUBICIN) 400 mg in sterile water (preservative free) 8 mL IV syringe RF formulation  400 mg IntraVENous Q24H    sodium chloride (NS) flush 5-40 mL  5-40 mL IntraVENous Q8H    sodium chloride (NS) flush 5-40 mL  5-40 mL IntraVENous PRN    acetaminophen (TYLENOL) tablet 650 mg  650 mg Oral Q4H PRN    naloxone (NARCAN) injection 0.4 mg  0.4 mg IntraVENous PRN    enoxaparin (LOVENOX) injection 40 mg  40 mg SubCUTAneous Q24H            Lab Review:     Recent Labs     04/25/19  0510 04/24/19  0655 04/23/19  2111   WBC 6.9 8.0 12.3*   HGB 11.4* 11.3* 12.3   HCT 35.8* 34.8* 37.2    185 232     Recent Labs     04/25/19  0510 04/23/19  2111    136   K 3.6 2.9*    102   CO2 28 31   * 76   BUN 10 6   CREA 0.64* 0.80   CA 8.6 9.0   MG 1.8  --    PHOS 3.3  --    ALB 3.4* 4.5   SGOT  --  21   ALT  --  32     No components found for: GLPOC  No results for input(s): PH, PCO2, PO2, HCO3, FIO2 in the last 72 hours. No results for input(s): INR in the last 72 hours.     No lab exists for component: Augustine Szymanski  Lab Results   Component Value Date/Time    Specimen Description: BLOOD 02/04/2010 10:55 PM    Specimen Description: BLOOD 02/12/2009 11:30 PM     Lab Results   Component Value Date/Time    Culture result: NO GROWTH 2 DAYS 04/23/2019 09:11 PM    Culture result: HEAVY POSSIBLE ENTEROCOCCUS SPECIES (A) 04/23/2019 09:11 PM    Culture result: HEAVY GRAM NEGATIVE RODS (A) 04/23/2019 09:11 PM    Culture result: HEAVY DIPHTHEROIDS (A) 04/23/2019 09:11 PM              ___________________________________________________    Attending Physician: Janith Klinefelter, MD

## 2019-04-25 NOTE — ANESTHESIA POSTPROCEDURE EVALUATION
Procedure(s):  IRRIGATION AND DEBRIDEMENT LEFT FOOT, BIOPSY LEFT FOOT (POP/SAPH BLOCK). general, regional    Anesthesia Post Evaluation      Multimodal analgesia: multimodal analgesia not used between 6 hours prior to anesthesia start to PACU discharge  Patient location during evaluation: PACU  Patient participation: complete - patient participated  Level of consciousness: awake  Pain management: adequate  Airway patency: patent  Anesthetic complications: no  Cardiovascular status: acceptable, blood pressure returned to baseline and hemodynamically stable  Respiratory status: acceptable  Hydration status: acceptable        Vitals Value Taken Time   /93 4/25/2019  6:51 PM   Temp 36.4 °C (97.6 °F) 4/25/2019  5:57 PM   Pulse 63 4/25/2019  6:53 PM   Resp 8 4/25/2019  6:53 PM   SpO2 94 % 4/25/2019  6:53 PM   Vitals shown include unvalidated device data.

## 2019-04-25 NOTE — PROGRESS NOTES
Patient down for MRI today returned, staff unable to complete although patient was medicated prior to. Medication was not effective. Patient was anxious then falling asleep upon arrival back to the floor. He was verbally aggressive. Patient was instructed prior to MRI need for testing and premedicated and he verbalized understanding. Patient complained of not having dry dressing to Left lower ext, informed patient that dressing was clean, dry and intact to both wounds to lower ext. Dr Selmer Fabry was called and made aware, new orders were reviewed for alternate imaging. Report called to [facility].

## 2019-04-25 NOTE — CONSULTS
[address]     Name:  Duane Allen  MR#:  [id number]  :  1965  ACCOUNT #:  [de-identified]  DATE OF SERVICE:  2019      CHIEF COMPLAINT:  Left foot ulcer. HISTORY OF PRESENT ILLNESS:  The patient is a 50-year-old male with past medical history significant for left-sided hemiparesis following hemorrhagic stroke, chronic pain, and bladder cancer, who was admitted to [place] [address] on  with the aforementioned complaint. The patient states that he dropped an HVAC unit on the top of his foot approximately eight months ago. He has been undergoing wound care in the outpatient setting and was placed in a boot which caused him ulceration near the medial malleolus. He was seen by home health on the day of admission, at which time he was found to have a malodorous drainage from the wound. He presented to [place] [address] for further evaluation and treatment. He is currently on daptomycin and cefepime. MRI has been ordered and surgical debridement is planned for later today. The Infectious Diseases service has been asked to assist with antibiotic management. PAST MEDICAL HISTORY:  Bladder cancer, gout, dyslipidemia, hypertension, seizure disorder, hemorrhagic stroke resulting in left-sided weakness, thromboembolus, thyroid disease. PAST SURGICAL HISTORY:  Right arthroscopy, unspecified left foot surgeries, cystoscopy with TURBT. SOCIAL HISTORY:  The patient is a current tobacco smoker. He drinks 14 cans of beer per week. FAMILY HISTORY:  Diabetes mellitus. Noncontributory. ALLERGIES:  SULFA, THIS CAUSED A RASH IN THE PAST. CIPROFLOXACIN CAUSES HIVES. VANCOMYCIN CAUSES TROUBLE BREATHING. OUTPATIENT MEDICATIONS:  Please see body of chart for details. He was not on antibiotics prior to admission. LABORATORY DATA:  White blood cell count was 12,000 at the time of admission, is 6900 today; platelet count 995,093. Creatinine is 0.64. Culture from the foot is growing enterococcus and gram-negative rods. X-rays negative for osteomyelitis. PHYSICAL EXAMINATION:  VITAL SIGNS:  Temperature 98.2 degrees Fahrenheit, maximum temperature is less than 100; heart rate 56 beats per minute, blood pressure 192/66, respiratory rate 18, oxygen saturation 93% on room air. GENERAL:  Alert, in no acute distress. HEENT:  Normocephalic, atraumatic. Mucous membranes moist.  HEART:  Regular rate and rhythm. No murmurs, gallops, rubs. PULMONARY:  Clear to auscultation bilaterally. ABDOMEN:  Obese, nontender, nondistended. EXTREMITIES:  No clubbing, cyanosis, or edema. SKIN:  There is a large ulcer adjacent to the medial malleolus and also on the dorsal aspect of the left foot adjacent to the second and third toes. There is periwound erythema and malodorous drainage. PSYCHIATRIC:  Normal affect. ASSESSMENT AND PLAN:  1. Chronic left lower extremity wounds following an injury approximately eight months ago. Preliminary cultures are growing gram-negative rods and enterococcus. The patient has a history of alcaligenes and pseudomonas from wound cultures in the past.  Antibiotics have been changed to daptomycin and cefepime. MRI is planned for later today. If an MRI could not be done due to the patient's claustrophobia, I recommend a CT scan. Podiatry has been consulted and the patient is scheduled for incision and drainage later today. Further recommendations pending results of above. 2.  Multiple antibiotic allergies include trimethoprim-sulfamethoxazole, ciprofloxacin, and vancomycin. 3.  History of hemorrhagic stroke with left-sided hemiparesis. Thank you for allowing me to participate in the care of this patient.       [address] DO REKHA [id number]  D:  04/25/2019 11:37  T:  04/25/2019 11:45  JOB #:  [id number]

## 2019-04-25 NOTE — PROGRESS NOTES
IV pump continuously beeping due to IV accidentally pulled mostly out by patient. Patient refused to allow to have another line put in saying it can wait until the \"professional line\" tomorrow. Refuses to get vital signs stating, \"you all are making my life hell, I should have gone somewhere else. \"    Patient demands IV pain medication, yet continues to refuse to get peripheral IV placed. Refusing CHG bath for I+D.    0545 Attempted to place IV, patient began verbally aggressive toward staff and attempts at placement called off. Will attempt on next shift or await PICC/Midline placement.

## 2019-04-25 NOTE — PROGRESS NOTES
BRYN amezcua RN called and relayed that pt has lost IV access and that Dr Javi Ureña would like midline placed s/p podiatry evaluation. Plan to place later today.

## 2019-04-25 NOTE — ANESTHESIA PROCEDURE NOTES
Peripheral Block    Start time: 4/25/2019 5:10 PM  End time: 4/25/2019 5:17 PM  Performed by: Mona Grajeda MD  Authorized by: Mona Grajeda MD       Pre-procedure: Indications: at surgeon's request and post-op pain management    Preanesthetic Checklist: patient identified, risks and benefits discussed, timeout performed, anesthesia consent given and patient being monitored    Timeout Time: 17:10          Block Type:   Block Type:  Popliteal  Monitoring:  Continuous pulse ox, frequent vital sign checks, heart rate, responsive to questions, oxygen and standard ASA monitoring  Injection Technique:  Single shot  Procedures: ultrasound guided and nerve stimulator    Patient Position: supine  Prep: chlorhexidine    Location:  Lower thigh  Needle Type:  Stimuplex  Needle Gauge:  22 G  Needle Localization:  Nerve stimulator and ultrasound guidance    Assessment:  Number of attempts:  1  Injection Assessment:  Incremental injection every 5 mL, local visualized surrounding nerve on ultrasound, negative aspiration for blood, no paresthesia and no intravascular symptoms  Patient tolerance:  Patient tolerated the procedure well with no immediate complications  U/S guided saphenous block with Ropivicaine 0.5% 10ml performed after popliteal block. Patient tolerated well without complications.

## 2019-04-25 NOTE — PERIOP NOTES
TRANSFER - OUT REPORT:    Verbal report given to Cabrera Gomez RN on Lulu Graff  being transferred to  for routine post - op       Report consisted of patients Situation, Background, Assessment and   Recommendations(SBAR). Information from the following report(s) SBAR and MAR was reviewed with the receiving nurse. Lines:       Opportunity for questions and clarification was provided.       Patient transported with:   Registered Nurse

## 2019-04-25 NOTE — BRIEF OP NOTE
BRIEF OPERATIVE NOTE    Date of Procedure: 4/25/2019   Preoperative Diagnosis: WOUNDS LEFT FOOT  Postoperative Diagnosis: Same   Procedure(s):  IRRIGATION AND DEBRIDEMENT LEFT FOOT, BIOPSY LEFT FOOT 35176, 21585 (POP/SAPH BLOCK)  Surgeon(s) and Role:     * Phil Kaur DPM - Primary         Surgical Assistant: Xenia Estimable    Surgical Staff:  Circ-1: Leobardo Adame RN  Scrub Tech-1: Ana Sexton  Surg Asst-1: Xenia Estimable  Event Time In Time Out   Incision Start 04/25/2019 1732    Incision Close 04/25/2019 1746      Anesthesia: MAC   Estimated Blood Loss: 2  Specimens:   ID Type Source Tests Collected by Time Destination   1 : left foot skin biopsy Preservative Foot, left  Alexandria Trevino DPM 4/25/2019 1733 Pathology      Findings: wounds as documented.  Total of [id number]  Complications: none  Implants: * No implants in log *

## 2019-04-25 NOTE — ANESTHESIA PREPROCEDURE EVALUATION
Anesthetic History   No history of anesthetic complications            Review of Systems / Medical History  Patient summary reviewed, nursing notes reviewed and pertinent labs reviewed    Pulmonary  Within defined limits  COPD               Neuro/Psych   Within defined limits  seizures    TIA     Cardiovascular  Within defined limits  Hypertension                   GI/Hepatic/Renal  Within defined limits              Endo/Other  Within defined limits    Hypothyroidism       Other Findings   Comments: Chronic pain and narcotic use           Physical Exam    Airway  Mallampati: II  TM Distance: > 6 cm  Neck ROM: normal range of motion   Mouth opening: Normal     Cardiovascular  Regular rate and rhythm,  S1 and S2 normal,  no murmur, click, rub, or gallop             Dental  No notable dental hx       Pulmonary  Breath sounds clear to auscultation               Abdominal  GI exam deferred       Other Findings            Anesthetic Plan    ASA: 3  Anesthesia type: general and regional - popliteal fossa block and saphenous block      Post-op pain plan if not by surgeon: peripheral nerve block single    Induction: Intravenous  Anesthetic plan and risks discussed with: Patient      Patient agreeable to proceed with block.

## 2019-04-26 ENCOUNTER — APPOINTMENT (OUTPATIENT)
Dept: GENERAL RADIOLOGY | Age: 54
DRG: 383 | End: 2019-04-26
Attending: INTERNAL MEDICINE
Payer: MEDICAID

## 2019-04-26 LAB
ALBUMIN SERPL-MCNC: 3.6 G/DL (ref 3.5–5)
ALBUMIN/GLOB SERPL: 1.2 {RATIO} (ref 1.1–2.2)
ALP SERPL-CCNC: 99 U/L (ref 45–117)
ALT SERPL-CCNC: 27 U/L (ref 12–78)
ANION GAP SERPL CALC-SCNC: 4 MMOL/L (ref 5–15)
AST SERPL-CCNC: 18 U/L (ref 15–37)
BACTERIA SPEC CULT: ABNORMAL
BASOPHILS # BLD: 0.1 K/UL (ref 0–0.1)
BASOPHILS NFR BLD: 1 % (ref 0–1)
BILIRUB SERPL-MCNC: 0.2 MG/DL (ref 0.2–1)
BUN SERPL-MCNC: 9 MG/DL (ref 6–20)
BUN/CREAT SERPL: 13 (ref 12–20)
CALCIUM SERPL-MCNC: 8.3 MG/DL (ref 8.5–10.1)
CHLORIDE SERPL-SCNC: 105 MMOL/L (ref 97–108)
CO2 SERPL-SCNC: 32 MMOL/L (ref 21–32)
CREAT SERPL-MCNC: 0.67 MG/DL (ref 0.7–1.3)
DIFFERENTIAL METHOD BLD: ABNORMAL
EOSINOPHIL # BLD: 0.4 K/UL (ref 0–0.4)
EOSINOPHIL NFR BLD: 4 % (ref 0–7)
ERYTHROCYTE [DISTWIDTH] IN BLOOD BY AUTOMATED COUNT: 14.4 % (ref 11.5–14.5)
GLOBULIN SER CALC-MCNC: 3.1 G/DL (ref 2–4)
GLUCOSE SERPL-MCNC: 94 MG/DL (ref 65–100)
GRAM STN SPEC: ABNORMAL
HCT VFR BLD AUTO: 34 % (ref 36.6–50.3)
HGB BLD-MCNC: 11 G/DL (ref 12.1–17)
IMM GRANULOCYTES # BLD AUTO: 0 K/UL (ref 0–0.04)
IMM GRANULOCYTES NFR BLD AUTO: 0 % (ref 0–0.5)
LYMPHOCYTES # BLD: 2.3 K/UL (ref 0.8–3.5)
LYMPHOCYTES NFR BLD: 24 % (ref 12–49)
MAGNESIUM SERPL-MCNC: 1.6 MG/DL (ref 1.6–2.4)
MCH RBC QN AUTO: 29.7 PG (ref 26–34)
MCHC RBC AUTO-ENTMCNC: 32.4 G/DL (ref 30–36.5)
MCV RBC AUTO: 91.9 FL (ref 80–99)
MONOCYTES # BLD: 0.8 K/UL (ref 0–1)
MONOCYTES NFR BLD: 8 % (ref 5–13)
NEUTS SEG # BLD: 5.9 K/UL (ref 1.8–8)
NEUTS SEG NFR BLD: 63 % (ref 32–75)
NRBC # BLD: 0 K/UL (ref 0–0.01)
NRBC BLD-RTO: 0 PER 100 WBC
PHOSPHATE SERPL-MCNC: 3.5 MG/DL (ref 2.6–4.7)
PLATELET # BLD AUTO: 192 K/UL (ref 150–400)
PMV BLD AUTO: 10.7 FL (ref 8.9–12.9)
POTASSIUM SERPL-SCNC: 3.5 MMOL/L (ref 3.5–5.1)
PROT SERPL-MCNC: 6.7 G/DL (ref 6.4–8.2)
RBC # BLD AUTO: 3.7 M/UL (ref 4.1–5.7)
SERVICE CMNT-IMP: ABNORMAL
SODIUM SERPL-SCNC: 141 MMOL/L (ref 136–145)
T3 SERPL-MCNC: 84 NG/DL (ref 71–180)
WBC # BLD AUTO: 9.4 K/UL (ref 4.1–11.1)

## 2019-04-26 PROCEDURE — 74011250636 HC RX REV CODE- 250/636: Performed by: INTERNAL MEDICINE

## 2019-04-26 PROCEDURE — 85025 COMPLETE CBC W/AUTO DIFF WBC: CPT

## 2019-04-26 PROCEDURE — 74011250637 HC RX REV CODE- 250/637: Performed by: NURSE PRACTITIONER

## 2019-04-26 PROCEDURE — 76937 US GUIDE VASCULAR ACCESS: CPT

## 2019-04-26 PROCEDURE — 36415 COLL VENOUS BLD VENIPUNCTURE: CPT

## 2019-04-26 PROCEDURE — 74011000250 HC RX REV CODE- 250: Performed by: INTERNAL MEDICINE

## 2019-04-26 PROCEDURE — 77030018786 HC NDL GD F/USND BARD -B

## 2019-04-26 PROCEDURE — C1751 CATH, INF, PER/CENT/MIDLINE: HCPCS

## 2019-04-26 PROCEDURE — 80053 COMPREHEN METABOLIC PANEL: CPT

## 2019-04-26 PROCEDURE — 74011250636 HC RX REV CODE- 250/636: Performed by: PODIATRIST

## 2019-04-26 PROCEDURE — 74011250637 HC RX REV CODE- 250/637: Performed by: PODIATRIST

## 2019-04-26 PROCEDURE — 83735 ASSAY OF MAGNESIUM: CPT

## 2019-04-26 PROCEDURE — 74011250637 HC RX REV CODE- 250/637: Performed by: INTERNAL MEDICINE

## 2019-04-26 PROCEDURE — 65270000029 HC RM PRIVATE

## 2019-04-26 PROCEDURE — 84100 ASSAY OF PHOSPHORUS: CPT

## 2019-04-26 PROCEDURE — 74011000258 HC RX REV CODE- 258: Performed by: INTERNAL MEDICINE

## 2019-04-26 RX ORDER — LORAZEPAM 2 MG/ML
4 INJECTION INTRAMUSCULAR
Status: DISCONTINUED | OUTPATIENT
Start: 2019-04-26 | End: 2019-05-04

## 2019-04-26 RX ORDER — LORAZEPAM 2 MG/ML
2 INJECTION INTRAMUSCULAR
Status: DISCONTINUED | OUTPATIENT
Start: 2019-04-26 | End: 2019-05-04

## 2019-04-26 RX ORDER — SERTRALINE HYDROCHLORIDE 50 MG/1
50 TABLET, FILM COATED ORAL DAILY
Status: DISCONTINUED | OUTPATIENT
Start: 2019-04-26 | End: 2019-05-10 | Stop reason: HOSPADM

## 2019-04-26 RX ORDER — DOXYCYCLINE HYCLATE 100 MG
100 TABLET ORAL EVERY 12 HOURS
Status: DISCONTINUED | OUTPATIENT
Start: 2019-04-26 | End: 2019-04-26

## 2019-04-26 RX ORDER — LORAZEPAM 2 MG/ML
2 INJECTION INTRAMUSCULAR ONCE
Status: COMPLETED | OUTPATIENT
Start: 2019-04-26 | End: 2019-04-26

## 2019-04-26 RX ORDER — TRAZODONE HYDROCHLORIDE 50 MG/1
50 TABLET ORAL
Status: DISCONTINUED | OUTPATIENT
Start: 2019-04-26 | End: 2019-05-10 | Stop reason: HOSPADM

## 2019-04-26 RX ADMIN — SERTRALINE HYDROCHLORIDE 50 MG: 50 TABLET ORAL at 17:14

## 2019-04-26 RX ADMIN — Medication 10 ML: at 23:10

## 2019-04-26 RX ADMIN — PIPERACILLIN SODIUM,TAZOBACTAM SODIUM 3.38 G: 3; .375 INJECTION, POWDER, FOR SOLUTION INTRAVENOUS at 13:37

## 2019-04-26 RX ADMIN — KETOROLAC TROMETHAMINE 30 MG: 30 INJECTION, SOLUTION INTRAMUSCULAR at 23:11

## 2019-04-26 RX ADMIN — LISINOPRIL 20 MG: 20 TABLET ORAL at 09:54

## 2019-04-26 RX ADMIN — SODIUM CHLORIDE 100 MG: 900 INJECTION, SOLUTION INTRAVENOUS at 18:25

## 2019-04-26 RX ADMIN — LORAZEPAM 2 MG: 2 INJECTION INTRAMUSCULAR; INTRAVENOUS at 09:56

## 2019-04-26 RX ADMIN — METHADONE HYDROCHLORIDE 130 MG: 10 CONCENTRATE ORAL at 09:56

## 2019-04-26 RX ADMIN — Medication 10 ML: at 13:38

## 2019-04-26 RX ADMIN — KETOROLAC TROMETHAMINE 30 MG: 30 INJECTION, SOLUTION INTRAMUSCULAR at 13:38

## 2019-04-26 RX ADMIN — CEFTRIAXONE SODIUM 2 G: 2 INJECTION, POWDER, FOR SOLUTION INTRAMUSCULAR; INTRAVENOUS at 17:14

## 2019-04-26 RX ADMIN — FOLIC ACID: 5 INJECTION, SOLUTION INTRAMUSCULAR; INTRAVENOUS; SUBCUTANEOUS at 13:36

## 2019-04-26 RX ADMIN — DOXYCYCLINE HYCLATE 100 MG: 100 TABLET, COATED ORAL at 13:37

## 2019-04-26 RX ADMIN — KETOROLAC TROMETHAMINE 30 MG: 30 INJECTION, SOLUTION INTRAMUSCULAR at 18:22

## 2019-04-26 RX ADMIN — ENOXAPARIN SODIUM 40 MG: 40 INJECTION SUBCUTANEOUS at 09:57

## 2019-04-26 RX ADMIN — Medication 1 CAPSULE: at 20:02

## 2019-04-26 RX ADMIN — ALLOPURINOL 100 MG: 100 TABLET ORAL at 09:54

## 2019-04-26 RX ADMIN — AMLODIPINE BESYLATE 5 MG: 5 TABLET ORAL at 09:54

## 2019-04-26 RX ADMIN — TRAZODONE HYDROCHLORIDE 50 MG: 50 TABLET ORAL at 23:11

## 2019-04-26 RX ADMIN — TAMSULOSIN HYDROCHLORIDE 0.4 MG: 0.4 CAPSULE ORAL at 09:54

## 2019-04-26 RX ADMIN — OXYCODONE HYDROCHLORIDE 10 MG: 5 TABLET ORAL at 18:22

## 2019-04-26 RX ADMIN — ACETAMINOPHEN 650 MG: 325 TABLET ORAL at 20:05

## 2019-04-26 NOTE — PROGRESS NOTES
MIDLINE PLACEMENT NOTE    Midline catheters are NOT central lines. Approved midline products are peripheral infusion devices with the tip terminating in the arm at or below the axillary vein, distal to the shoulder. The tip DOES NOT ENTER THE CENTRAL VASCULATURE. A Midline is for reliable short term (less than 30 days) vascular access. PRE-PROCEDURE VERIFICATION  Correct Procedure: yes  Correct Site:  yes  Temperature: Temp: 98.5 °F (36.9 °C), Temperature Source: Temp Source: Oral  Recent Labs     04/26/19  0025   BUN 9   CREA 0.67*      WBC 9.4     Allergies: Sulfamethoxazole-trimethoprim; Ciprofloxacin; Codeine; Cymbalta [duloxetine]; Gabapentin; Lyrica [pregabalin]; Sulfa (sulfonamide antibiotics); Ultram [tramadol]; and Vancomycin  Education materials for Midline Care given: yes. See Patient Education activity for further details. Midline Booklet placed at bedside: yes    Midline Catheter Maintenance: For complete information reference Policy # [id number]    A. Dressing Changes       1. Assess the dressing in the first 24 hours for accumulation of blood, fluid or moisture beneath the dressing. During dressing changes, assess the external length of the catheter to determine if migration of the catheter has occurred. Periodically confirm catheter placement, tip location, patency and security of dressing. Dressing changes should be done every 7 days, and PRN using sterile technique if integrity of dressing is compromised. Apply new dressing per hospital policy. Caution: Do not use scissors to remove dressing to minimize the risk of cutting the  Catheter. B. Flushing       1. Flush each lumen of the catheter with 10 mL of sterile saline every 12 hours or after each use. In addition, lock each lumen of the catheter with sterile saline. Note: Flush with 20 mL of sterile saline after blood therapy   Caution: DO NOT USE A SYRINGE SMALLER THAN 10 mL TO FLUSH AND CONFIRM PATENCY.   Patency should be assessed with a 10 mL or larger syringe and  sterile saline. Upon confirmation of patency, administration of medication should be  given in a syringe appropriately sized for the dose. Do not infuse against resistance. C. Blood Draws:        1. Stop infusion, draw off and waste 10 ml of blood. Draw sample with 10 mL syringe or          greater. DO NOT USE VACUTAINER . Transfer with appropriate device to lab tubes. Flush        with 20 ml NS.  D. Occluded or Partially Occluded Catheter       1. Catheters that present resistance to flushing and aspiration may be partially or completely  occluded. Do not flush against resistance. PROCEDURE DETAIL:    Verbal consent was obtained and all questions were answered related to risks and benefits. A Midline was inserted as a sterile procedure using ultrasound and Modified Seldinger Technique for vascular access. The following documentation is in addition to the Midline properties in the lines/airways Doc Flow Sheet:  Lot #: [id number]  Lidocaine 1% administered intradermally :yes  Internal Catheter Total Length: 15 (cm)   External catheter length: 0 (cm)  Vein Selection for Midline:right cephalic  Sterile CVC Insertion Bundle was used to place line yes  Complication Related to Insertion:no    Prior to use, line patency MUST be verified by flushing at least a 10 mL syringe. Line should flush easily without resistance, and withdrawal of brisk blood return to verify patency.     Line is okay to use: yes        (Remove Midline by:  5/25/19            )  Althea Rosario RN

## 2019-04-26 NOTE — CONSULTS
PSYCHIATRY CONSULT NOTE:    CC: \"I'm not crazy. \"    REASON FOR CONSULT:  Psych Eval for CSU    HISTORY OF PRESENTING COMPLAINT:  Per H&P: Mr. Gemma Terry is a 46 y.o.  male with a hx of chronic pain, bladder CA, left-sided hemiparesis following hemorrhagic stroke who presented to the Emergency Department complaining of left foot ulceration. Per patient, dropped HVAC unit on top of foot 8 months ago, has been getting wound care but initially placed in post-op boot which caused ulceration near medial malleolus. Home health RN evaluated wound today and concerned for infection 2/2 weeping wound, malodorous. Patient had apparently \"moving\" and had not changed dressing in 1 week. In ED, WBC elevated but otherwise unremarkable evaluation. We will admit for further management. PAST PSYCHIATRIC HISTORY:  Mr. Gemma Terry is a 69-year-old WM who is admitted to [facility] for the above medical conditions. He has never sought out or been treated for a psychiatric condition in the past. Eventually is able to admit that he has been under a lot of stress lately, actually since his divorce approx 10 years ago. \"I got a lot of things on my plate. My ex-wife destroyed it all. \" Blames aneurysm/CVA on her. Somewhat fixated on the 11-year relationship with his second/last wife, her demand for more money, and her eventual affair that led to the divorce. Does not appear to have dealt with this well. Is unable to talk about almost anything else. Reports very poor sleep, high anxiety times. Self-medicated stress/depression during marriage with alcohol. Is now sober. Does take methadone for pain and visits a clinic daily for this. Does not want to share info about this with writer, \"Isn't that my business. \" Is not rude or agitated in this statement, more guarded as if worried this may be taken from him. Endorses SI several times over the years, is not sure when last felt that way. \"I'm sad. I've thought about it, but I could never do it. \" Denies any attempts in the past. No HI/AVH. SUBSTANCE ABUSE HISTORY:  Social History     Substance and Sexual Activity   Drug Use Yes    Types: Prescription     Social History     Substance and Sexual Activity   Alcohol Use Yes    Alcohol/week: 8.4 oz    Types: 14 Cans of beer per week    Comment: occasional     Social History     Tobacco Use   Smoking Status Current Every Day Smoker    Packs/day: 1.00   Smokeless Tobacco Never Used       PAST MEDICAL HISTORY:  Past Medical History:   Diagnosis Date    Aneurysm ([facility]     (with stroke)    Bladder tumor     Cancer ([facility]     bladder CA    Chronic pain     Family history of bladder cancer     Gout     History of vascular access device 04/25/2019    [facility] VAT 4 FR MIDLINE R Cephalic Limited access    History of vascular access device 04/26/2019    4 fr Midline right Cephalic midline access    Hypercholesterolemia     Hypertension     Lesion of bladder     Seizures ([facility]     Stroke ([facility] 2006    left sided weakness    [facility]     Thyroid disease     TIA (transient ischemic attack) 2012       SOCIAL HISTORY:  Currently living alone and collects disability. Has an 70-year-old daughter. Feels guilty he does not help her more. 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 Very proud of this. Currently single,  twice. Was \"madly in love\" with his first wife. \"I should've stayed with her forever. \"      VITALS:  Visit Vitals  /73 (BP 1 Location: Left arm, BP Patient Position: At rest)   Pulse 67   Temp 98.5 °F (36.9 °C)   Resp 14   Ht 6' 1\" (1.854 m)   Wt 108.9 kg (240 lb)   SpO2 95%   BMI 31.66 kg/m²       MEDICATIONS:    Current Facility-Administered Medications:     LORazepam (ATIVAN) injection 4 mg, 4 mg, IntraVENous, Q1H PRN, Michael Alvarez MD    LORazepam (ATIVAN) injection 2 mg, 2 mg, IntraVENous, Q1H PRN, Michael Alvarez MD    [START ON 4/27/2019] 0.9% sodium chloride 1,000 mL with mvi, adult no. 4 with vit K 10 mL, thiamine 377 mg, folic acid 1 mg infusion, , IntraVENous, Q24H, Yunier Hansen MD    TIGEcycline (TYGACIL) 100 mg in 0.9% sodium chloride 100 mL IVPB, 100 mg, IntraVENous, ONCE, Mireille Joel DO    [START ON 4/27/2019] TIGEcycline (TYGACIL) 50 mg in 0.9% sodium chloride (MBP/ADV) 100 mL, 50 mg, IntraVENous, Q12H, Mireille Joel DO    cefTRIAXone (ROCEPHIN) 2 g in 0.9% sodium chloride (MBP/ADV) 50 mL, 2 g, IntraVENous, Q24H, Ayan Joyner DO    lactated Ringers infusion, 100 mL/hr, IntraVENous, CONTINUOUS, Pica, Nancy Shutter, DPM, Last Rate: 100 mL/hr at 04/25/19 2124, 100 mL/hr at 04/25/19 2124    amLODIPine (NORVASC) tablet 5 mg, 5 mg, Oral, DAILY, Pica, Nancy Shutter, DPM, 5 mg at 04/26/19 0954    oxyCODONE IR (ROXICODONE) tablet 10 mg, 10 mg, Oral, Q6H PRN, Pica, Nancy Shutter, DPM, 10 mg at 04/25/19 2116    hydrALAZINE (APRESOLINE) 20 mg/mL injection 10 mg, 10 mg, IntraVENous, Q6H PRN, Pica, Nancy Shutter, DPM    lactobac ac& pc-s.therm-b.anim (YULIA Q/RISAQUAD), 1 Cap, Oral, DAILY, Pica, Nancy Shutter, DPM    ketorolac (TORADOL) injection 30 mg, 30 mg, IntraVENous, Q6H, Pica, Nancy Shutter, DPM, 30 mg at 04/26/19 1338    allopurinol (ZYLOPRIM) tablet 100 mg, 100 mg, Oral, DAILY, Pica, Nancy Shutter, DPM, 100 mg at 04/26/19 0954    levothyroxine (SYNTHROID) tablet 88 mcg, 88 mcg, Oral, ACB, Pica, Nancy Shutter, DPM, 88 mcg at 04/25/19 0737    lisinopril (PRINIVIL, ZESTRIL) tablet 20 mg, 20 mg, Oral, DAILY, PicTushar pate, DPM, 20 mg at 04/26/19 0954    melatonin tablet 3 mg, 3 mg, Oral, QHS PRN, Pica, Nancy Shutter, DPM, 3 mg at 04/25/19 0426    tamsulosin (FLOMAX) capsule 0.4 mg, 0.4 mg, Oral, DAILY, Pica, Nancy Clemenciaer, DPM, 0.4 mg at 04/26/19 0954    methadone (DOLOPHINE) 10 mg/mL concentrated solution 130 mg, 130 mg, Oral, DAILY, Pica, Nancy Shutter, DPM, 130 mg at 04/26/19 0956    sodium chloride (NS) flush 5-40 mL, 5-40 mL, IntraVENous, Q8H, Tushar Kaur N, DPM, 10 mL at 04/26/19 1338    sodium chloride (NS) flush 5-40 mL, 5-40 mL, IntraVENous, PRN, Pica, Kita Lauren, DPM    acetaminophen (TYLENOL) tablet 650 mg, 650 mg, Oral, Q4H PRN, Pica, Kita Lauren, DPM, 650 mg at 04/25/19 1116    naloxone (NARCAN) injection 0.4 mg, 0.4 mg, IntraVENous, PRN, Pica, Kita Lauren, DPM    enoxaparin (LOVENOX) injection 40 mg, 40 mg, SubCUTAneous, Q24H, Pica, Kita Lauren, DPM, 40 mg at 04/26/19 0957    MENTAL STATUS EXAM:  Oriented in all spheres, drowsy  Goal directed  Mood: depressed and sad  Affect: appropriate  Thoughts: somewhat obsessive related to damage from ex-wife  Speech: slowed  Sensorium: No A/V hallucinations  Safety: no SI/HI     ASSESSMENT AND PLAN:  Axis I:  Major Depression, Rec  Axis II: Deferred   Axis III: See chart  Axis IV: Problems with primary support group and Problems related to social environment   Axis V: GAF 41-50 serious symptoms today    Assessment: Mr. Gifty Major is met in room on 4th floor at [facility]. He is initially uncomfortable with meeting with this writer, did not expect to see psychiatry. Explained that the [facility] is often asked to see clients who may be stressed for many reasons and assured him that no one suggested that he may be psychotic. He rather quickly begins to open up about past relationship. He speaks about it as though it is currently or very recently happening. Makes poor eye contact in conversation, appears tired. Becomes tearful, does not want to look \"weak\". Does admit to being quite depressed, to the point of feeling suicidal more than once. Never wanted to seek treatment. Is willing today to accept help from this writer and CM. Interested in antidepressant medication. Treatment naive. Recommendations:   1.) Zoloft 50 mg po depression/anxiety  2.) Trazodone 50mg po hs sleep, may increase to 100mg if not effective    Thank you to the medical team for caring for this client. Please re-consult as needed if condition should change or otherwise worsen.      Frankie Hurst NP  4/26/2019

## 2019-04-26 NOTE — PROGRESS NOTES
Pt found on commode after ambulating to BR independently. VERY unsteady and resistant to assistance. Did safely assist patient back to bed and allowing patient to complete pericare and hand hygiene in BR and changing linens on bed. Reinforced with patient that he is a VERY HIGH FALL RISK but pt continues to state that he \"will do what he needs to do. \"  Pt currently seated at side of bed. Will round frequently.

## 2019-04-26 NOTE — PROGRESS NOTES
4/26/2019     4:29 PM  CM awaiting psych note for [facility] referral and/or other recommended service. [facility] is closed over the weekend. CM will fax psych consult when they reopen. 10:54 AM  CM spoke with psych NP following rapid response on pt to inform them of pt's current status (anxious, irritated, potential detox). Psych NP reported she plans to meet with pt today. CM to fax finalized note to [facility], [facility] and/or coordinate in other alternate services as recommended by psych and medical staff.

## 2019-04-26 NOTE — PROGRESS NOTES
Nutrition Assessment:    RECOMMENDATIONS/INTERVENTION(S):   Continue Regular diet- consider Cardiac if appropriate  Monitor PO intakes, weight, skin integrity  Add Ensure HP once per day, Ten once per day. ASSESSMENT:   4/26: 46  Yr old male admitted for foot infection. PMhx: CVA w hemiparesis, HTN, tobacco, gout. Pt screened for LOS. Pt had code atlas called earlier. Pt was given ativan. Pt unable to give diet hx when stopped to visit. Pt is on Regular diet. Monitor need to switch to Cardiac. Pt POD#1 of foot debridement. Added ONS for wound healing. Pt to have IV antibiotics. Weight has been mostly stable per chart, with slight trend up in weight. Appetite seems good. NO hx of chew/swallow difficulties. No c/d, n/v reported. Labs reviewed. SUBJECTIVE/OBJECTIVE:   Diet Order: Regular  % Eaten:    Patient Vitals for the past 168 hrs:   % Diet Eaten   04/24/19 0036 100 %       Pertinent Medications: [x] Reviewed    Labs reviewed:  [x]     Anthropometrics: Height: 6' 1\" (185.4 cm) Weight: 108.9 kg (240 lb)    IBW (%IBW):   ( ) UBW (%UBW):   (  %)    BMI: Body mass index is 31.66 kg/m². This BMI is indicative of:     [] Underweight    [] Normal    [] Overweight    [x]  Obesity    []  Extreme Obesity (BMI>40)    Estimated Nutrition Needs (Based on): 7107 Kcals/day , 81 g(-109g/day(0.8-1.0g/kg)) Protein  Carbohydrate: At Least 130 g/day  Fluids: 2600 mL/day (1mL/kg rounded to 50 mL)    Last BM: 4/24   []Active     []Hyperactive  []Hypoactive       [] Absent   BS  Skin:    [] Intact   [x] Incision  [x] Breakdown   [] DTI   [] Tears/Excoriation/Abrasion  []Edema [] Other:    Wt Readings from Last 30 Encounters:   04/25/19 108.9 kg (240 lb)   04/24/19 108.9 kg (240 lb)   09/28/18 103.4 kg (228 lb)   09/28/18 103.4 kg (228 lb)   09/05/18 101.2 kg (223 lb)   08/08/18 101.2 kg (223 lb)   07/31/18 104.3 kg (230 lb)   06/05/18 102.3 kg (225 lb 8.5 oz)   06/04/18 104.3 kg (230 lb)   03/13/18 100.4 kg (221 lb 5.5 oz) 03/14/18 100.2 kg (221 lb)   03/06/18 106.1 kg (234 lb)   12/21/17 94.3 kg (208 lb)   08/13/17 94.3 kg (208 lb)   01/27/17 99.2 kg (218 lb 9.6 oz)   01/06/17 99.3 kg (219 lb)   12/13/16 105.1 kg (231 lb 12.8 oz)   10/14/16 102 kg (224 lb 13.9 oz)   09/10/16 108.4 kg (239 lb)   06/02/16 108.5 kg (239 lb 4 oz)   11/09/15 101.6 kg (224 lb)   02/12/13 109.8 kg (242 lb)   02/07/13 108.9 kg (240 lb)   01/30/13 104.3 kg (230 lb)   12/12/12 104.5 kg (230 lb 6.4 oz)   08/30/12 105.2 kg (232 lb)   04/10/12 107 kg (236 lb)   01/25/12 107 kg (236 lb)   06/14/11 109.8 kg (242 lb)   06/13/11 109.8 kg (242 lb)      NUTRITION DIAGNOSES:   Problem:  Not ready for diet/lifestyle change     Etiology: related to unwilling in learning applying information     Signs/Symptoms: as evidenced by reports of excessive etoh intakes, w/d, agitated      NUTRITION INTERVENTIONS:  Meals/Snacks: General/healthful diet   Supplements: Commercial supplement              GOAL:   Pt will consume >50% of meals w/i 5-7 days    Cultural, Moravian, or Ethnic Dietary Needs: None     LEARNING NEEDS (Diet, Food/Nutrient-Drug Interaction):    [x] None Identified   [] Identified and Education Provided/Documented   [] Identified and Pt declined/was not appropriate      [x] Interdisciplinary Care Plan Reviewed/Documented    [x] Discharge Needs: CCD / no etoh   [] No Nutrition Related Discharge Needs    NUTRITION RISK:   Pt Is At Nutrition Risk  [x]     No Nutrition Risk Identified  []       PT SEEN FOR:    []  MD Consult: []Calorie Count      []Diabetic Diet Education        []Diet Education     []Electrolyte Management     []General Nutrition Management and Supplements     []Management of Tube Feeding     []TPN Recommendations    []  RN Referral:  []MST score >=2     []Enteral/Parenteral Nutrition PTA     []Pregnant: Gestational DM or Multigestation                 [] Pressure Ulcer      []  Low BMI      [x]  Length of Stay       [] Dysphagia Diet     [] Ventilator      [] Follow-Up        Previous Recommendations:   [] Implemented          [] Not Implemented          [x] Not Applicable    Previous Goal:   [] Met              [] Progressing Towards Goal              [] Not Progressing Towards Goal   [x] Not Applicable              Jennifer Browne, [address] [place] [address]  Pager: [phone]  Office: [phone]

## 2019-04-26 NOTE — PROGRESS NOTES
[facility] Progress Note           Alcides Alegria DO    [phone] Office  [phone]  Fax    2019      Assessment & Plan:   1. Chronic left lower extremity wounds. Unable to tolerate MRI. CT negative for OM. S/p I&D by podiatry yesteday. No cultures sent. Previous cultures growing enterococcus and providencia however these cultures were surface cultures from only one wound. Continue broad spectrum coverage with pip-tazo/doxy. Await operative report  2. Multiple antibiotic allergies include trimethoprim-sulfamethoxazole, ciprofloxacin, and vancomycin. 3.  History of hemorrhagic stroke with left-sided hemiparesis. Addendum: Cultures now growing SMALT. Patient allergic to sulfa. Organism is intermediate to ceftaz and cipro. Sensitive to tigecycline. Tigecycline has poor activity against providencia so will add ceftriaxone. Omadacycline (new tetracycline abx) and cefixime may be appropriate for PO deescalation at discharge              Subjective:     No new complaints    Objective:     Vitals:   Visit Vitals  /73 (BP 1 Location: Left arm, BP Patient Position: At rest)   Pulse 67   Temp 98.5 °F (36.9 °C)   Resp 14   Ht 6' 1\" (1.854 m)   Wt 108.9 kg (240 lb)   SpO2 95%   BMI 31.66 kg/m²        Tmax:  Temp (24hrs), Av °F (36.7 °C), Min:97.6 °F (36.4 °C), Max:98.5 °F (36.9 °C)      Exam:   Patient is intubated:  no    Physical Examination:   General:  Alert. Mildly confused   Head:  Normocephalic, atraumatic. Eyes:  Conjunctivae clear   Neck: Supple       Lungs:   No distress   Chest wall:     Heart:     Abdomen:   Soft, non-tender, non-distended   Extremities: Moves all. Skin: LLE dressed   Neurologic: CNII-XII intact.       Labs:        No lab exists for component: ITNL   Recent Labs     19  0510   CPK 60     Recent Labs     19  0025 19  0510 19  0655 19  2111    140  --  136   K 3.5 3.6  --  2.9*    106  --  102   CO2 32 28  --  31   BUN 9 10  --  6   CREA 0.67* 0.64*  --  0.80   GLU 94 118*  --  76   PHOS 3.5 3.3  --   --    MG 1.6 1.8  --   --    ALB 3.6 3.4*  --  4.5   WBC 9.4 6.9 8.0 12.3*   HGB 11.0* 11.4* 11.3* 12.3   HCT 34.0* 35.8* 34.8* 37.2    180 185 232     No results for input(s): INR, PTP, APTT in the last 72 hours. No lab exists for component: INREXT  Needs: urine analysis, urine sodium, protein and creatinine  No results found for: VIVEK, CREAU      Cultures:     Lab Results   Component Value Date/Time    Specimen Description: BLOOD 02/04/2010 10:55 PM    Specimen Description: BLOOD 02/12/2009 11:30 PM     Lab Results   Component Value Date/Time    Culture result: NO GROWTH 3 DAYS 04/23/2019 09:11 PM    Culture result: HEAVY ENTEROCOCCUS FAECALIS GROUP D (A) 04/23/2019 09:11 PM    Culture result: HEAVY PROVIDENCIA RETTGERI (A) 04/23/2019 09:11 PM    Culture result: HEAVY DIPHTHEROIDS (A) 04/23/2019 09:11 PM       Radiology:     Medications       Current Facility-Administered Medications   Medication Dose Route Frequency Last Dose    LORazepam (ATIVAN) injection 4 mg  4 mg IntraVENous Q1H PRN      LORazepam (ATIVAN) injection 2 mg  2 mg IntraVENous Q1H PRN      0.9% sodium chloride 1,000 mL with mvi, adult no. 4 with vit K 10 mL, thiamine 463 mg, folic acid 1 mg infusion   IntraVENous ONCE      [START ON 4/27/2019] 0.9% sodium chloride 1,000 mL with mvi, adult no. 4 with vit K 10 mL, thiamine 254 mg, folic acid 1 mg infusion   IntraVENous Q24H      lactated Ringers infusion  100 mL/hr IntraVENous CONTINUOUS 100 mL/hr at 04/25/19 2124    amLODIPine (NORVASC) tablet 5 mg  5 mg Oral DAILY 5 mg at 04/26/19 0954    oxyCODONE IR (ROXICODONE) tablet 10 mg  10 mg Oral Q6H PRN 10 mg at 04/25/19 2116    hydrALAZINE (APRESOLINE) 20 mg/mL injection 10 mg  10 mg IntraVENous Q6H PRN      cefepime (MAXIPIME) 2 g in 0.9% sodium chloride (MBP/ADV) 100 mL  2 g IntraVENous Q8H 2 g at 04/25/19 2116    lactobac ac& pc-s.therm-b.anim (YULIA Q/RISAQUAD)  1 Cap Oral DAILY      ketorolac (TORADOL) injection 30 mg  30 mg IntraVENous Q6H 30 mg at 04/25/19 2355    allopurinol (ZYLOPRIM) tablet 100 mg  100 mg Oral DAILY 100 mg at 04/26/19 0954    levothyroxine (SYNTHROID) tablet 88 mcg  88 mcg Oral ACB 88 mcg at 04/25/19 0737    lisinopril (PRINIVIL, ZESTRIL) tablet 20 mg  20 mg Oral DAILY 20 mg at 04/26/19 0954    melatonin tablet 3 mg  3 mg Oral QHS PRN 3 mg at 04/25/19 0426    tamsulosin (FLOMAX) capsule 0.4 mg  0.4 mg Oral DAILY 0.4 mg at 04/26/19 0954    methadone (DOLOPHINE) 10 mg/mL concentrated solution 130 mg  130 mg Oral DAILY 130 mg at 04/26/19 0956    DAPTOmycin (CUBICIN) 400 mg in sterile water (preservative free) 8 mL IV syringe RF formulation  400 mg IntraVENous Q24H 400 mg at 04/25/19 1737    sodium chloride (NS) flush 5-40 mL  5-40 mL IntraVENous Q8H 10 mL at 04/25/19 2117    sodium chloride (NS) flush 5-40 mL  5-40 mL IntraVENous PRN      acetaminophen (TYLENOL) tablet 650 mg  650 mg Oral Q4H  mg at 04/25/19 1116    naloxone (NARCAN) injection 0.4 mg  0.4 mg IntraVENous PRN      enoxaparin (LOVENOX) injection 40 mg  40 mg SubCUTAneous Q24H 40 mg at 04/26/19 9456           Case discussed with:      You Louise DO

## 2019-04-26 NOTE — PROGRESS NOTES
9:15 AM  Code dave called on patient. Pt very belligerent with staff and threw contents of table at PCT. Nursing Supervisor informed. Daughter informed case management that pt is a very heavy drinker and has had episodes like this recently at another hospital.  Dr. Equilla Dakin informed and new order received, stated to give 2mg Ativan now. 11:49 AM  Pt requesting to come off turn team.  \"I turn myself\"  Informed pt that repositioning frequently was very important to prevent skin breakdown, pt stated he understood. Removed from turn team per patient. 4:38 PM  Bedside and Verbal shift change report given to [address] [place] [address] (oncoming nurse) by Alessandro Rick (offgoing nurse). Report included the following information SBAR, MAR and Recent Results.

## 2019-04-26 NOTE — PROGRESS NOTES
Gigi Marino [facility] [address]  [address] [facility], [address] [place] [address]  [phone]      Medical Progress Note      NAME: Delmar Bob   :  1965  MRM:  [id number]    Date/Time: 2019        Assessment / Plan:     Left foot infection/ ulcer:  I&D / biopsied yesterday. Several attempt at MRI have not been successful as pt is uncooperative, despite sedation/pain control. CT showed no drainable abscess or e/o osteomyelitis. Wound culture polymicrobial, including enterococcus, Providencia, and Stenotrophomonas. Continue abx per ID, which have been changed to tigecycline and CTX. Per ID, omadacycline (new tetracycline abx) and cefixime may be appropriate for PO deescalation at discharge    HTN: largely controlled. Cont lisinopril and added Norvasc. IV hydralazine PRN     Chronic pain syndrome: on methadone and oxycodone, confirmed by pharmacy    ETOH abuse: per report. Pt denies. No signs of withdrawal on my exam, but has been intermittently agitated. IV Ativan PRN per CIWA protocol     Gout: stable, on allopurinol      Bladder cancer: s/p surgery in 2018 at [facility]. Stable. follow up outpatient      Hypothyroidism: TSH markedly elevated. FT4 low, T3 okay. Doubtful that he has been compliant with Synthroid. Continue Synthroid at current dose for now; will need to discuss with pt. Regardless, repeat TFTs in 4 weeks     Hx of hemorrhagic stroke with left sided hemiparesis: Uses wheelchair and walker at baseline. PT/OT consult. Continue supportive care     Chronic obstructive pulmonary disease (HCC) (). Stable. PRN nebs     Hypokalemia (). Replete and monitor. Check Mg     Tobacco abuse (). Advise cessation      Total time spent: 35 minutes  Time spent in the care of this patient including reviewing records, discussing with nursing and/or other providers on the treatment team, obtaining history and examining the patient, and discussing treatment plans.                   Care Plan discussed with: Patient, Nursing Staff and >50% of time spent in counseling and coordination of care    Discussed:  Care Plan and D/C Planning    Prophylaxis:  Lovenox    Disposition:  TBD         Subjective:     Chief Complaint:  Follow up foot infection    Chart/notes/labs/studies reviewed, patient examined at bedside. Reports pain in foot. Per nursing agitated. Cooperative and calm with me. No fevers. Objective:       Vitals:        Last 24hrs VS reviewed since prior progress note. Most recent are:    Visit Vitals  BP (!) 162/95 (BP 1 Location: Left arm, BP Patient Position: Sitting)   Pulse 71   Temp 98.1 °F (36.7 °C)   Resp 16   Ht 6' 1\" (1.854 m)   Wt 108.9 kg (240 lb)   SpO2 94%   BMI 31.66 kg/m²     SpO2 Readings from Last 6 Encounters:   04/26/19 94%   10/12/18 96%   09/28/18 95%   08/03/18 100%   08/01/18 95%   06/08/18 97%    O2 Flow Rate (L/min): 8 l/min       Intake/Output Summary (Last 24 hours) at 4/26/2019 1640  Last data filed at 4/25/2019 1820  Gross per 24 hour   Intake --   Output 300 ml   Net -300 ml          Exam:     Physical Exam:    Gen:  Unkempt. Chronically ill-appearing. NAD  HEENT:  Sclerae nonicteric, hearing intact to voice, mucous membranes moist  Neck:  Supple, without masses. Resp:  No accessory muscle use, CTAB without wheezes, rales, or rhonchi  Card: RRR, without m/r/g. No LE edema. Abd:  +bowel sounds, soft, NTTP, nondistended. Neuro: Face symmetric, tongue midline, speech fluent, follows commands appropriately  Psych:  Alert, oriented x 3.  Poor insight  Skin: dressing C/D/I       Medications Reviewed: (see below)    Lab Data Reviewed: (see below)    ______________________________________________________________________    Medications:     Current Facility-Administered Medications   Medication Dose Route Frequency    LORazepam (ATIVAN) injection 4 mg  4 mg IntraVENous Q1H PRN    LORazepam (ATIVAN) injection 2 mg  2 mg IntraVENous Q1H PRN    [START ON 4/27/2019] 0.9% sodium chloride 1,000 mL with mvi, adult no. 4 with vit K 10 mL, thiamine 758 mg, folic acid 1 mg infusion   IntraVENous Q24H    TIGEcycline (TYGACIL) 100 mg in 0.9% sodium chloride 100 mL IVPB  100 mg IntraVENous ONCE    [START ON 4/27/2019] TIGEcycline (TYGACIL) 50 mg in 0.9% sodium chloride (MBP/ADV) 100 mL  50 mg IntraVENous Q12H    cefTRIAXone (ROCEPHIN) 2 g in 0.9% sodium chloride (MBP/ADV) 50 mL  2 g IntraVENous Q24H    sertraline (ZOLOFT) tablet 50 mg  50 mg Oral DAILY    traZODone (DESYREL) tablet 50 mg  50 mg Oral QHS    amLODIPine (NORVASC) tablet 5 mg  5 mg Oral DAILY    oxyCODONE IR (ROXICODONE) tablet 10 mg  10 mg Oral Q6H PRN    hydrALAZINE (APRESOLINE) 20 mg/mL injection 10 mg  10 mg IntraVENous Q6H PRN    lactobac ac& pc-s.therm-b.anim (YULIA Q/RISAQUAD)  1 Cap Oral DAILY    ketorolac (TORADOL) injection 30 mg  30 mg IntraVENous Q6H    allopurinol (ZYLOPRIM) tablet 100 mg  100 mg Oral DAILY    levothyroxine (SYNTHROID) tablet 88 mcg  88 mcg Oral ACB    lisinopril (PRINIVIL, ZESTRIL) tablet 20 mg  20 mg Oral DAILY    melatonin tablet 3 mg  3 mg Oral QHS PRN    tamsulosin (FLOMAX) capsule 0.4 mg  0.4 mg Oral DAILY    methadone (DOLOPHINE) 10 mg/mL concentrated solution 130 mg  130 mg Oral DAILY    sodium chloride (NS) flush 5-40 mL  5-40 mL IntraVENous Q8H    sodium chloride (NS) flush 5-40 mL  5-40 mL IntraVENous PRN    acetaminophen (TYLENOL) tablet 650 mg  650 mg Oral Q4H PRN    naloxone (NARCAN) injection 0.4 mg  0.4 mg IntraVENous PRN    enoxaparin (LOVENOX) injection 40 mg  40 mg SubCUTAneous Q24H            Lab Review:     Recent Labs     04/26/19  0025 04/25/19  0510 04/24/19  0655   WBC 9.4 6.9 8.0   HGB 11.0* 11.4* 11.3*   HCT 34.0* 35.8* 34.8*    180 185     Recent Labs     04/26/19  0025 04/25/19  0510 04/23/19  2111    140 136   K 3.5 3.6 2.9*    106 102   CO2 32 28 31   GLU 94 118* 76   BUN 9 10 6   CREA 0.67* 0.64* 0.80   CA 8.3* 8.6 9.0   MG 1.6 1.8  --    PHOS 3.5 3.3  --    ALB 3.6 3.4* 4.5   SGOT 18  --  21   ALT 27  --  32     No components found for: GLPOC  No results for input(s): PH, PCO2, PO2, HCO3, FIO2 in the last 72 hours. No results for input(s): INR in the last 72 hours. No lab exists for component: Bartholomew Aw  Lab Results   Component Value Date/Time    Specimen Description: BLOOD 02/04/2010 10:55 PM    Specimen Description: BLOOD 02/12/2009 11:30 PM     Lab Results   Component Value Date/Time    Culture result: NO GROWTH 3 DAYS 04/23/2019 09:11 PM    Culture result: HEAVY ENTEROCOCCUS FAECALIS GROUP D (A) 04/23/2019 09:11 PM    Culture result: HEAVY PROVIDENCIA RETTGERI (A) 04/23/2019 09:11 PM    Culture result: (A) 04/23/2019 09:11 PM     HEAVY STENOTROPHOMONAS (Singh Puja.) MALTOPHILIA CLSI GUIDELINES DO NOT RECOMMEND REPORTING SUSCEPTIBILITIES FOR S. MALTOPHILIA. TRIMETHOPRIM/SULFAMETHOXAZOLE IS THE DRUG OF CHOICE.     Culture result: HEAVY DIPHTHEROIDS (A) 04/23/2019 09:11 PM              ___________________________________________________    Attending Physician: Hardik Multani MD

## 2019-04-26 NOTE — PROGRESS NOTES
Patient found sitting on side of bed with urine on floor and midline mostly out of arm. Patient stated, \"I had to pee and you all had me tied up to too many things. \"  While staff cleaning floor, patient stated, \"I told you all I had bladder issues, but you didn't listen. \"    Patient refuses IV access.

## 2019-04-26 NOTE — PROGRESS NOTES
Patient educated by nurse and doctor regarding the use of the CIWA scale and ativan for withdrawal from alcohol. Pt verbalized understanding and agreed to keep nursing informed in changes.

## 2019-04-26 NOTE — PROGRESS NOTES
responded to Code [place] room 405. Staff with patient providing care. No spiritual support needed at this time. No family or visitors present. Will continue to follow up as needed and upon request as a ble. Visited by Rev. Jaguar Branham, [address] [place] [address] Road paging service: [phone]

## 2019-04-27 LAB
ANION GAP SERPL CALC-SCNC: 5 MMOL/L (ref 5–15)
BASOPHILS # BLD: 0.1 K/UL (ref 0–0.1)
BASOPHILS NFR BLD: 1 % (ref 0–1)
BUN SERPL-MCNC: 17 MG/DL (ref 6–20)
BUN/CREAT SERPL: 26 (ref 12–20)
CALCIUM SERPL-MCNC: 8.5 MG/DL (ref 8.5–10.1)
CHLORIDE SERPL-SCNC: 104 MMOL/L (ref 97–108)
CO2 SERPL-SCNC: 32 MMOL/L (ref 21–32)
CREAT SERPL-MCNC: 0.66 MG/DL (ref 0.7–1.3)
DIFFERENTIAL METHOD BLD: ABNORMAL
EOSINOPHIL # BLD: 0.5 K/UL (ref 0–0.4)
EOSINOPHIL NFR BLD: 6 % (ref 0–7)
ERYTHROCYTE [DISTWIDTH] IN BLOOD BY AUTOMATED COUNT: 14.3 % (ref 11.5–14.5)
GLUCOSE SERPL-MCNC: 97 MG/DL (ref 65–100)
HCT VFR BLD AUTO: 35.6 % (ref 36.6–50.3)
HGB BLD-MCNC: 11.6 G/DL (ref 12.1–17)
IMM GRANULOCYTES # BLD AUTO: 0 K/UL (ref 0–0.04)
IMM GRANULOCYTES NFR BLD AUTO: 0 % (ref 0–0.5)
LYMPHOCYTES # BLD: 2.3 K/UL (ref 0.8–3.5)
LYMPHOCYTES NFR BLD: 29 % (ref 12–49)
MAGNESIUM SERPL-MCNC: 1.8 MG/DL (ref 1.6–2.4)
MCH RBC QN AUTO: 29.7 PG (ref 26–34)
MCHC RBC AUTO-ENTMCNC: 32.6 G/DL (ref 30–36.5)
MCV RBC AUTO: 91.3 FL (ref 80–99)
MONOCYTES # BLD: 0.8 K/UL (ref 0–1)
MONOCYTES NFR BLD: 10 % (ref 5–13)
NEUTS SEG # BLD: 4.3 K/UL (ref 1.8–8)
NEUTS SEG NFR BLD: 54 % (ref 32–75)
NRBC # BLD: 0 K/UL (ref 0–0.01)
NRBC BLD-RTO: 0 PER 100 WBC
PHOSPHATE SERPL-MCNC: 3.9 MG/DL (ref 2.6–4.7)
PLATELET # BLD AUTO: 218 K/UL (ref 150–400)
PMV BLD AUTO: 10.4 FL (ref 8.9–12.9)
POTASSIUM SERPL-SCNC: 4 MMOL/L (ref 3.5–5.1)
RBC # BLD AUTO: 3.9 M/UL (ref 4.1–5.7)
SODIUM SERPL-SCNC: 141 MMOL/L (ref 136–145)
WBC # BLD AUTO: 8 K/UL (ref 4.1–11.1)

## 2019-04-27 PROCEDURE — 83735 ASSAY OF MAGNESIUM: CPT

## 2019-04-27 PROCEDURE — 74011250637 HC RX REV CODE- 250/637: Performed by: PODIATRIST

## 2019-04-27 PROCEDURE — 74011000258 HC RX REV CODE- 258: Performed by: INTERNAL MEDICINE

## 2019-04-27 PROCEDURE — 36415 COLL VENOUS BLD VENIPUNCTURE: CPT

## 2019-04-27 PROCEDURE — 84100 ASSAY OF PHOSPHORUS: CPT

## 2019-04-27 PROCEDURE — 74011250636 HC RX REV CODE- 250/636: Performed by: INTERNAL MEDICINE

## 2019-04-27 PROCEDURE — 80048 BASIC METABOLIC PNL TOTAL CA: CPT

## 2019-04-27 PROCEDURE — 65270000029 HC RM PRIVATE

## 2019-04-27 PROCEDURE — 74011250636 HC RX REV CODE- 250/636: Performed by: PODIATRIST

## 2019-04-27 PROCEDURE — 94760 N-INVAS EAR/PLS OXIMETRY 1: CPT

## 2019-04-27 PROCEDURE — 74011000250 HC RX REV CODE- 250: Performed by: INTERNAL MEDICINE

## 2019-04-27 PROCEDURE — 74011250637 HC RX REV CODE- 250/637: Performed by: INTERNAL MEDICINE

## 2019-04-27 PROCEDURE — 74011250637 HC RX REV CODE- 250/637: Performed by: NURSE PRACTITIONER

## 2019-04-27 PROCEDURE — 85025 COMPLETE CBC W/AUTO DIFF WBC: CPT

## 2019-04-27 RX ORDER — OXYCODONE HYDROCHLORIDE 5 MG/1
10 TABLET ORAL ONCE
Status: COMPLETED | OUTPATIENT
Start: 2019-04-27 | End: 2019-04-27

## 2019-04-27 RX ORDER — AMLODIPINE BESYLATE 5 MG/1
10 TABLET ORAL DAILY
Status: DISCONTINUED | OUTPATIENT
Start: 2019-04-27 | End: 2019-05-10 | Stop reason: HOSPADM

## 2019-04-27 RX ADMIN — LEVOTHYROXINE SODIUM 88 MCG: 88 TABLET ORAL at 06:30

## 2019-04-27 RX ADMIN — KETOROLAC TROMETHAMINE 30 MG: 30 INJECTION, SOLUTION INTRAMUSCULAR at 06:24

## 2019-04-27 RX ADMIN — SODIUM CHLORIDE 50 MG: 900 INJECTION, SOLUTION INTRAVENOUS at 18:11

## 2019-04-27 RX ADMIN — FOLIC ACID: 5 INJECTION, SOLUTION INTRAMUSCULAR; INTRAVENOUS; SUBCUTANEOUS at 17:00

## 2019-04-27 RX ADMIN — SERTRALINE HYDROCHLORIDE 50 MG: 50 TABLET ORAL at 08:33

## 2019-04-27 RX ADMIN — ALLOPURINOL 100 MG: 100 TABLET ORAL at 08:33

## 2019-04-27 RX ADMIN — KETOROLAC TROMETHAMINE 30 MG: 30 INJECTION, SOLUTION INTRAMUSCULAR at 17:14

## 2019-04-27 RX ADMIN — OXYCODONE HYDROCHLORIDE 10 MG: 5 TABLET ORAL at 21:35

## 2019-04-27 RX ADMIN — SODIUM CHLORIDE 50 MG: 900 INJECTION, SOLUTION INTRAVENOUS at 06:26

## 2019-04-27 RX ADMIN — ACETAMINOPHEN 650 MG: 325 TABLET ORAL at 04:28

## 2019-04-27 RX ADMIN — OXYCODONE HYDROCHLORIDE 10 MG: 5 TABLET ORAL at 15:29

## 2019-04-27 RX ADMIN — LISINOPRIL 20 MG: 20 TABLET ORAL at 08:33

## 2019-04-27 RX ADMIN — ACETAMINOPHEN 650 MG: 325 TABLET ORAL at 13:58

## 2019-04-27 RX ADMIN — Medication 10 ML: at 06:25

## 2019-04-27 RX ADMIN — METHADONE HYDROCHLORIDE 130 MG: 10 CONCENTRATE ORAL at 08:33

## 2019-04-27 RX ADMIN — TAMSULOSIN HYDROCHLORIDE 0.4 MG: 0.4 CAPSULE ORAL at 08:33

## 2019-04-27 RX ADMIN — Medication 10 ML: at 21:35

## 2019-04-27 RX ADMIN — CEFTRIAXONE SODIUM 2 G: 2 INJECTION, POWDER, FOR SOLUTION INTRAMUSCULAR; INTRAVENOUS at 17:16

## 2019-04-27 RX ADMIN — OXYCODONE HYDROCHLORIDE 10 MG: 5 TABLET ORAL at 18:50

## 2019-04-27 RX ADMIN — AMLODIPINE BESYLATE 10 MG: 5 TABLET ORAL at 08:33

## 2019-04-27 RX ADMIN — TRAZODONE HYDROCHLORIDE 50 MG: 50 TABLET ORAL at 21:35

## 2019-04-27 RX ADMIN — OXYCODONE HYDROCHLORIDE 10 MG: 5 TABLET ORAL at 09:40

## 2019-04-27 RX ADMIN — KETOROLAC TROMETHAMINE 30 MG: 30 INJECTION, SOLUTION INTRAMUSCULAR at 12:00

## 2019-04-27 RX ADMIN — Medication 10 ML: at 13:58

## 2019-04-27 RX ADMIN — OXYCODONE HYDROCHLORIDE 10 MG: 5 TABLET ORAL at 04:28

## 2019-04-27 RX ADMIN — ACETAMINOPHEN 650 MG: 325 TABLET ORAL at 18:17

## 2019-04-27 NOTE — PROGRESS NOTES
Patient states, \" pain management is currently not therapeutic for his pain. \"  Patient was administered pain medication per MAR. Will continue to monitor.

## 2019-04-27 NOTE — PROGRESS NOTES
[facility] Podiatric Surgery  Progress Note  Subjective:  Alek Álvarez: following for left lower extremity ulcerations with cellulitis. Afebrile. No pain for about a day after debridement due to block. Pain is back but better controlled. ROS:   Constitutional: Negative for fever, chills, weight loss. Positive for fatigue  HENT: Negative for nosebleeds and congestion. Eyes: Negative for blurred vision and double vision. Respiratory: Negative for cough, shortness of breath and wheezing. Cardiovascular: Negative for chest pain, palpitations, claudication. Positive for leg swelling  Gastrointestinal: Negative for heartburn, nausea, vomiting, abdominal pain and diarrhea. Genitourinary: Negative for dysuria and urgency. Musculoskeletal: positive for weakness and pain  Skin: positive for wounds  Neurological: Negative for dizziness. Positive for weakness  Endo/Heme/Allergies: Negative for environmental allergies. Does not bruise/bleed easily. 2  Psychiatric/Behavioral: positive for depression    Objective:  Blood pressure 187/84, pulse 85, temperature 97.9 °F (36.6 °C), resp. rate 16, height 6' 1\" (1.854 m), weight 108.9 kg (240 lb), SpO2 95 %. Intake/Output Summary (Last 24 hours) at 4/27/2019 1030  Last data filed at 4/26/2019 1822  Gross per 24 hour   Intake 50 ml   Output 250 ml   Net -200 ml         Physical Exam:  General appearance: alert, cooperative, no distress, appears stated age     Lower Extremity Exam:  Vascular:    Dorsalis Pedis Pulse: present  Posterior Tibialis Pulse: present  Popliteal and Femoral Pulses: present  Skin Temp: warm to warm right and left lower extremities legs to toes  Extremity Edema: +1 pitting with chronic hemosiderin deposition  Varicosities: present     Neurological:  Deep Tendon Reflexes of Achilles and Patellar: diminished but intact right.  Diminished left  Epicritic and Protective Sensations: absent     Deep Pain Response: present     Dermatological:  Toenails: mycotic 1-5 right and left foot  Ulceration:  Ulceration present to dorsal left foot   Measures 2cm x 3cm x0.1cm  Granular wound bed noted. Erythema resolved     Ulceration noted to medial left heel  Measure 4cm x 3.8cm x 0.1cm  Fibrogranular wound bed  Erythema resolved     Rash: absent     Musculoskeletal:  Gait and Station: antalgic and foot drop        Labs:  Lab Results   Component Value Date/Time    WBC 8.0 04/27/2019 12:50 AM    HGB 11.6 (L) 04/27/2019 12:50 AM    HCT 35.6 (L) 04/27/2019 12:50 AM    MCV 91.3 04/27/2019 12:50 AM    PLATELET 307 58/89/2317 12:50 AM     Lab Results   Component Value Date/Time    Sodium 141 04/27/2019 12:50 AM    Potassium 4.0 04/27/2019 12:50 AM    Chloride 104 04/27/2019 12:50 AM    CO2 32 04/27/2019 12:50 AM    BUN 17 04/27/2019 12:50 AM    Glucose 97 04/27/2019 12:50 AM     Lab Results   Component Value Date/Time    INR 1.1 12/21/2017 11:28 AM       Current Medications:    Current Facility-Administered Medications:     amLODIPine (NORVASC) tablet 10 mg, 10 mg, Oral, DAILY, Basilia Paulino MD, 10 mg at 04/27/19 2272    LORazepam (ATIVAN) injection 4 mg, 4 mg, IntraVENous, Q1H PRN, Basilia Paulino MD    LORazepam (ATIVAN) injection 2 mg, 2 mg, IntraVENous, Q1H PRN, Basilia Paulino MD    0.9% sodium chloride 1,000 mL with mvi, adult no. 4 with vit K 10 mL, thiamine 590 mg, folic acid 1 mg infusion, , IntraVENous, Q24H, Basilia Paulino MD    TIGEcycline (TYGACIL) 50 mg in 0.9% sodium chloride (MBP/ADV) 100 mL, 50 mg, IntraVENous, Q12H, Fatuma Bansal DO, Last Rate: 100 mL/hr at 04/27/19 0626, 50 mg at 04/27/19 0626    cefTRIAXone (ROCEPHIN) 2 g in 0.9% sodium chloride (MBP/ADV) 50 mL, 2 g, IntraVENous, Q24H, Fatuma Bansal DO, Last Rate: 100 mL/hr at 04/26/19 1714, 2 g at 04/26/19 1714    sertraline (ZOLOFT) tablet 50 mg, 50 mg, Oral, DAILY, Faye Ureña NP, 50 mg at 04/27/19 0833    traZODone (DESYREL) tablet 50 mg, 50 mg, Oral, QHS, Dave Gipson, NP, 50 mg at 04/26/19 2311    oxyCODONE IR (ROXICODONE) tablet 10 mg, 10 mg, Oral, Q6H PRN, Pica, Rosealee Pyo, DPM, 10 mg at 04/27/19 0940    hydrALAZINE (APRESOLINE) 20 mg/mL injection 10 mg, 10 mg, IntraVENous, Q6H PRN, Pica, Rosealee Pyo, DPM    lactobac ac& pc-s.therm-b.anim (YULIA Q/RISAQUAD), 1 Cap, Oral, DAILY, Pica, Rosealee Pyo, DPM, 1 Cap at 04/26/19 2002    ketorolac (TORADOL) injection 30 mg, 30 mg, IntraVENous, Q6H, Pica, Rosealee Pyo, DPM, 30 mg at 04/27/19 1200    allopurinol (ZYLOPRIM) tablet 100 mg, 100 mg, Oral, DAILY, Pica, Rosealee Pyo, DPM, 100 mg at 04/27/19 1800    levothyroxine (SYNTHROID) tablet 88 mcg, 88 mcg, Oral, ACB, Pica, Rosealee Pyo, DPM, 88 mcg at 04/27/19 0630    lisinopril (PRINIVIL, ZESTRIL) tablet 20 mg, 20 mg, Oral, DAILY, Pica, Rosealee Pyo, DPM, 20 mg at 04/27/19 8832    melatonin tablet 3 mg, 3 mg, Oral, QHS PRN, Pica, Rosealee Pyo, DPM, 3 mg at 04/25/19 0426    tamsulosin (FLOMAX) capsule 0.4 mg, 0.4 mg, Oral, DAILY, Pica, Rosealee Pyo, DPM, 0.4 mg at 04/27/19 2328    methadone (DOLOPHINE) 10 mg/mL concentrated solution 130 mg, 130 mg, Oral, DAILY, Pica, Rosealee Pyo, DPM, 130 mg at 04/27/19 3955    sodium chloride (NS) flush 5-40 mL, 5-40 mL, IntraVENous, Q8H, Pica, Tushar N, DPM, 10 mL at 04/27/19 9532    sodium chloride (NS) flush 5-40 mL, 5-40 mL, IntraVENous, PRN, Pica, Rosealee Pyo, DPM    acetaminophen (TYLENOL) tablet 650 mg, 650 mg, Oral, Q4H PRN, Pica, Rosealee Pyo, DPM, 650 mg at 04/27/19 0428    naloxone (NARCAN) injection 0.4 mg, 0.4 mg, IntraVENous, PRN, Pica, Rosealee Pyo, DPM    enoxaparin (LOVENOX) injection 40 mg, 40 mg, SubCUTAneous, Q24H, Pica, Rosealee Pyo, DPM, 40 mg at 04/26/19 0957    Impression  1. Ulcerations x2 left lower extremity  2. Cellulitis left lower extremity, resolved    Plan:  - wound biopsy pending. Unna boot compression applied. Antibiotics per ID.     - Wounds improving with resolving erythema.     - Will leave unna boot dressing intact pending biopsy. If negative for pyoderma or neoplasma, then would need vascular eval for venous reflux which could be completed on an outpatient basis. Compression dressing is a must with elevation. Following. Nancy Dai.  JENAE Kaur [facility]  Podiatric Surgeon  Board Certified Wound Specialist  [phone] cell

## 2019-04-27 NOTE — ROUTINE PROCESS
..Bedside shift change report given to Edgard Wilkerson RN (oncoming nurse) by Max Nolasco RN (offgoing nurse). Report included the following information SBAR, Kardex, Intake/Output, MAR, Accordion, Procedure Verification and Quality Measures.

## 2019-04-27 NOTE — ROUTINE PROCESS
Bedside shift change report given to Mike He RN (oncoming nurse) by Rosalva Carrasco RN (offgoing nurse). Report included the following information SBAR, Kardex and MAR.

## 2019-04-27 NOTE — PROGRESS NOTES
Gigi Marino [facility]  [address] [facility], [address] [place] [address]  [phone]      Medical Progress Note      NAME: Emanuel Presley   :  1965  MRM:  [id number]    Date/Time: 2019        Assessment / Plan:     Left foot infection/ ulcer: I&D / biopsied yesterday. Several attempt at MRI have not been successful as pt was uncooperative, despite sedation/pain control. CT showed no drainable abscess or e/o osteomyelitis. Wound culture polymicrobial, including enterococcus, Providencia, and Stenotrophomonas. Continue abx per ID, which have been changed to tigecycline and CTX. Per ID, omadacycline (new tetracycline abx) and cefixime may be appropriate for PO deescalation at discharge. Awaiting biopsy; podiatry concerned re: possible pyoderma gangrenosum. Per podiatry, if negative for pyoderma or neoplasma, then would need vascular eval for venous reflux which could be completed on an outpatient basis. Disposition and ongoing outpatient wound care may be barriers to discharge, as pt has been staying with his ex-brother in law and essentially homeless. HTN: largely controlled. Cont lisinopril and added Norvasc. IV hydralazine PRN     Chronic pain syndrome: on methadone (confirmed by pharmacy) and oxycodone    ETOH abuse: per report. Pt denies. No signs of withdrawal on my exam, but has been intermittently agitated. IV Ativan PRN per CIWA protocol however showing no signs of withdrawal     Gout: stable, on allopurinol      Bladder cancer: s/p surgery in 2018 at [facility] general. Stable. follow up outpatient      Hypothyroidism: TSH markedly elevated. FT4 low, T3 okay. Has not been fully comliant Synthroid. Continue Synthroid at current dose for now. Regardless, repeat TFTs in 4 weeks     Hx of hemorrhagic stroke with left sided hemiparesis: Uses wheelchair and walker at baseline. PT/OT consult. Continue supportive care     Chronic obstructive pulmonary disease (HCC) (). Stable. PRN nebs     Hypokalemia (). Replete and monitor. Check Mg     Tobacco abuse: recommended smoking cessation. Discussed possible pharmacotherapy including nicotine replacement therapy, Chantix/Wellbutrin. Provided number for 1-800-QUIT-NOW. I spent ~4 minutes (outside of the time documented for this note) exclusively focused on tobacco cessation counseling      Total time spent: 25 minutes  Time spent in the care of this patient including reviewing records, discussing with nursing and/or other providers on the treatment team, obtaining history and examining the patient, and discussing treatment plans. Care Plan discussed with: Patient, Nursing Staff and >50% of time spent in counseling and coordination of care    Discussed:  Care Plan and D/C Planning    Prophylaxis:  Lovenox    Disposition:  TBD         Subjective:     Chief Complaint:  Follow up foot infection    Chart/notes/labs/studies reviewed, patient examined at bedside. Reports pain in foot but better after pain meds. No agitation. No fevers. Loose stools w/o diarrhea         Objective:       Vitals:        Last 24hrs VS reviewed since prior progress note. Most recent are:    Visit Vitals  /90 (BP 1 Location: Left arm, BP Patient Position: At rest)   Pulse 78   Temp 97.9 °F (36.6 °C)   Resp 16   Ht 6' 1\" (1.854 m)   Wt 108.9 kg (240 lb)   SpO2 91%   BMI 31.66 kg/m²     SpO2 Readings from Last 6 Encounters:   04/27/19 91%   10/12/18 96%   09/28/18 95%   08/03/18 100%   08/01/18 95%   06/08/18 97%    O2 Flow Rate (L/min): 8 l/min       Intake/Output Summary (Last 24 hours) at 4/27/2019 1406  Last data filed at 4/26/2019 1822  Gross per 24 hour   Intake 50 ml   Output 250 ml   Net -200 ml          Exam:     Physical Exam:    Gen:  Unkempt. Chronically ill-appearing. NAD  HEENT:  Sclerae nonicteric, hearing intact to voice, mucous membranes moist  Neck:  Supple, without masses.   Resp:  No accessory muscle use, CTAB without wheezes, rales, or rhonchi  Card: RRR, without m/r/g. No LE edema. Abd:  +bowel sounds, soft, NTTP, nondistended. Neuro: Face symmetric, tongue midline, speech fluent, follows commands appropriately. L-sided weakness from prior CVA  Psych:  Alert, oriented x 3. Limited insight  Skin: dressing C/D/I       Medications Reviewed: (see below)    Lab Data Reviewed: (see below)    ______________________________________________________________________    Medications:     Current Facility-Administered Medications   Medication Dose Route Frequency    amLODIPine (NORVASC) tablet 10 mg  10 mg Oral DAILY    LORazepam (ATIVAN) injection 4 mg  4 mg IntraVENous Q1H PRN    LORazepam (ATIVAN) injection 2 mg  2 mg IntraVENous Q1H PRN    0.9% sodium chloride 1,000 mL with mvi, adult no. 4 with vit K 10 mL, thiamine 437 mg, folic acid 1 mg infusion   IntraVENous Q24H    TIGEcycline (TYGACIL) 50 mg in 0.9% sodium chloride (MBP/ADV) 100 mL  50 mg IntraVENous Q12H    cefTRIAXone (ROCEPHIN) 2 g in 0.9% sodium chloride (MBP/ADV) 50 mL  2 g IntraVENous Q24H    sertraline (ZOLOFT) tablet 50 mg  50 mg Oral DAILY    traZODone (DESYREL) tablet 50 mg  50 mg Oral QHS    oxyCODONE IR (ROXICODONE) tablet 10 mg  10 mg Oral Q6H PRN    hydrALAZINE (APRESOLINE) 20 mg/mL injection 10 mg  10 mg IntraVENous Q6H PRN    lactobac ac& pc-s.therm-b.anim (YULIA Q/RISAQUAD)  1 Cap Oral DAILY    ketorolac (TORADOL) injection 30 mg  30 mg IntraVENous Q6H    allopurinol (ZYLOPRIM) tablet 100 mg  100 mg Oral DAILY    levothyroxine (SYNTHROID) tablet 88 mcg  88 mcg Oral ACB    lisinopril (PRINIVIL, ZESTRIL) tablet 20 mg  20 mg Oral DAILY    melatonin tablet 3 mg  3 mg Oral QHS PRN    tamsulosin (FLOMAX) capsule 0.4 mg  0.4 mg Oral DAILY    methadone (DOLOPHINE) 10 mg/mL concentrated solution 130 mg  130 mg Oral DAILY    sodium chloride (NS) flush 5-40 mL  5-40 mL IntraVENous Q8H    sodium chloride (NS) flush 5-40 mL  5-40 mL IntraVENous PRN    acetaminophen (TYLENOL) tablet 650 mg  650 mg Oral Q4H PRN    naloxone (NARCAN) injection 0.4 mg  0.4 mg IntraVENous PRN    enoxaparin (LOVENOX) injection 40 mg  40 mg SubCUTAneous Q24H            Lab Review:     Recent Labs     04/27/19  0050 04/26/19  0025 04/25/19  0510   WBC 8.0 9.4 6.9   HGB 11.6* 11.0* 11.4*   HCT 35.6* 34.0* 35.8*    192 180     Recent Labs     04/27/19  0050 04/26/19  0025 04/25/19  0510    141 140   K 4.0 3.5 3.6    105 106   CO2 32 32 28   GLU 97 94 118*   BUN 17 9 10   CREA 0.66* 0.67* 0.64*   CA 8.5 8.3* 8.6   MG 1.8 1.6 1.8   PHOS 3.9 3.5 3.3   ALB  --  3.6 3.4*   SGOT  --  18  --    ALT  --  27  --      No components found for: GLPOC  No results for input(s): PH, PCO2, PO2, HCO3, FIO2 in the last 72 hours. No results for input(s): INR in the last 72 hours. No lab exists for component: Rosauras Nessa  Lab Results   Component Value Date/Time    Specimen Description: BLOOD 02/04/2010 10:55 PM    Specimen Description: BLOOD 02/12/2009 11:30 PM     Lab Results   Component Value Date/Time    Culture result: NO GROWTH 4 DAYS 04/23/2019 09:11 PM    Culture result: HEAVY ENTEROCOCCUS FAECALIS GROUP D (A) 04/23/2019 09:11 PM    Culture result: HEAVY PROVIDENCIA RETTGERI (A) 04/23/2019 09:11 PM    Culture result: (A) 04/23/2019 09:11 PM     HEAVY STENOTROPHOMONAS (Selestine Sequin.) MALTOPHILIA CLSI GUIDELINES DO NOT RECOMMEND REPORTING SUSCEPTIBILITIES FOR S. MALTOPHILIA. TRIMETHOPRIM/SULFAMETHOXAZOLE IS THE DRUG OF CHOICE.     Culture result: HEAVY DIPHTHEROIDS (A) 04/23/2019 09:11 PM              ___________________________________________________    Attending Physician: Janith Klinefelter, MD

## 2019-04-28 LAB
ALBUMIN SERPL-MCNC: 3.5 G/DL (ref 3.5–5)
ANION GAP SERPL CALC-SCNC: 3 MMOL/L (ref 5–15)
BUN SERPL-MCNC: 27 MG/DL (ref 6–20)
BUN/CREAT SERPL: 33 (ref 12–20)
CALCIUM SERPL-MCNC: 8.4 MG/DL (ref 8.5–10.1)
CHLORIDE SERPL-SCNC: 105 MMOL/L (ref 97–108)
CO2 SERPL-SCNC: 30 MMOL/L (ref 21–32)
CREAT SERPL-MCNC: 0.82 MG/DL (ref 0.7–1.3)
GLUCOSE SERPL-MCNC: 97 MG/DL (ref 65–100)
MAGNESIUM SERPL-MCNC: 2 MG/DL (ref 1.6–2.4)
PHOSPHATE SERPL-MCNC: 4.1 MG/DL (ref 2.6–4.7)
POTASSIUM SERPL-SCNC: 3.8 MMOL/L (ref 3.5–5.1)
SODIUM SERPL-SCNC: 138 MMOL/L (ref 136–145)

## 2019-04-28 PROCEDURE — 74011250637 HC RX REV CODE- 250/637: Performed by: INTERNAL MEDICINE

## 2019-04-28 PROCEDURE — 83735 ASSAY OF MAGNESIUM: CPT

## 2019-04-28 PROCEDURE — 74011250636 HC RX REV CODE- 250/636: Performed by: PODIATRIST

## 2019-04-28 PROCEDURE — 36415 COLL VENOUS BLD VENIPUNCTURE: CPT

## 2019-04-28 PROCEDURE — 74011250637 HC RX REV CODE- 250/637: Performed by: PODIATRIST

## 2019-04-28 PROCEDURE — 74011250636 HC RX REV CODE- 250/636: Performed by: INTERNAL MEDICINE

## 2019-04-28 PROCEDURE — 65270000029 HC RM PRIVATE

## 2019-04-28 PROCEDURE — 74011000258 HC RX REV CODE- 258: Performed by: INTERNAL MEDICINE

## 2019-04-28 PROCEDURE — 94760 N-INVAS EAR/PLS OXIMETRY 1: CPT

## 2019-04-28 PROCEDURE — 74011000250 HC RX REV CODE- 250: Performed by: INTERNAL MEDICINE

## 2019-04-28 PROCEDURE — 80069 RENAL FUNCTION PANEL: CPT

## 2019-04-28 PROCEDURE — 74011250637 HC RX REV CODE- 250/637: Performed by: NURSE PRACTITIONER

## 2019-04-28 RX ORDER — PANTOPRAZOLE SODIUM 40 MG/1
40 TABLET, DELAYED RELEASE ORAL
Status: DISCONTINUED | OUTPATIENT
Start: 2019-04-28 | End: 2019-05-10 | Stop reason: HOSPADM

## 2019-04-28 RX ORDER — ACETAMINOPHEN 500 MG
4 TABLET ORAL
Status: DISCONTINUED | OUTPATIENT
Start: 2019-04-28 | End: 2019-05-10 | Stop reason: HOSPADM

## 2019-04-28 RX ORDER — OXYCODONE HYDROCHLORIDE 5 MG/1
15 TABLET ORAL
Status: DISCONTINUED | OUTPATIENT
Start: 2019-04-28 | End: 2019-05-03

## 2019-04-28 RX ORDER — ONDANSETRON 2 MG/ML
4 INJECTION INTRAMUSCULAR; INTRAVENOUS
Status: DISCONTINUED | OUTPATIENT
Start: 2019-04-28 | End: 2019-05-10 | Stop reason: HOSPADM

## 2019-04-28 RX ADMIN — TRAZODONE HYDROCHLORIDE 50 MG: 50 TABLET ORAL at 22:50

## 2019-04-28 RX ADMIN — LEVOTHYROXINE SODIUM 88 MCG: 88 TABLET ORAL at 06:30

## 2019-04-28 RX ADMIN — OXYCODONE HYDROCHLORIDE 15 MG: 5 TABLET ORAL at 23:39

## 2019-04-28 RX ADMIN — ACETAMINOPHEN 650 MG: 325 TABLET ORAL at 00:05

## 2019-04-28 RX ADMIN — ALLOPURINOL 100 MG: 100 TABLET ORAL at 08:35

## 2019-04-28 RX ADMIN — ACETAMINOPHEN 650 MG: 325 TABLET ORAL at 08:35

## 2019-04-28 RX ADMIN — PANTOPRAZOLE SODIUM 40 MG: 40 TABLET, DELAYED RELEASE ORAL at 13:25

## 2019-04-28 RX ADMIN — ENOXAPARIN SODIUM 40 MG: 40 INJECTION SUBCUTANEOUS at 08:36

## 2019-04-28 RX ADMIN — Medication 10 ML: at 14:00

## 2019-04-28 RX ADMIN — Medication 10 ML: at 06:30

## 2019-04-28 RX ADMIN — OXYCODONE HYDROCHLORIDE 15 MG: 5 TABLET ORAL at 17:57

## 2019-04-28 RX ADMIN — KETOROLAC TROMETHAMINE 30 MG: 30 INJECTION, SOLUTION INTRAMUSCULAR at 06:30

## 2019-04-28 RX ADMIN — SODIUM CHLORIDE 50 MG: 900 INJECTION, SOLUTION INTRAVENOUS at 17:57

## 2019-04-28 RX ADMIN — CEFTRIAXONE SODIUM 2 G: 2 INJECTION, POWDER, FOR SOLUTION INTRAMUSCULAR; INTRAVENOUS at 16:00

## 2019-04-28 RX ADMIN — ACETAMINOPHEN 650 MG: 325 TABLET ORAL at 12:53

## 2019-04-28 RX ADMIN — AMLODIPINE BESYLATE 10 MG: 5 TABLET ORAL at 08:35

## 2019-04-28 RX ADMIN — OXYCODONE HYDROCHLORIDE 10 MG: 5 TABLET ORAL at 03:37

## 2019-04-28 RX ADMIN — Medication 10 ML: at 22:52

## 2019-04-28 RX ADMIN — SERTRALINE HYDROCHLORIDE 50 MG: 50 TABLET ORAL at 08:35

## 2019-04-28 RX ADMIN — FOLIC ACID: 5 INJECTION, SOLUTION INTRAMUSCULAR; INTRAVENOUS; SUBCUTANEOUS at 17:57

## 2019-04-28 RX ADMIN — KETOROLAC TROMETHAMINE 30 MG: 30 INJECTION, SOLUTION INTRAMUSCULAR at 00:06

## 2019-04-28 RX ADMIN — METHADONE HYDROCHLORIDE 130 MG: 10 CONCENTRATE ORAL at 08:36

## 2019-04-28 RX ADMIN — Medication 1 CAPSULE: at 08:35

## 2019-04-28 RX ADMIN — ONDANSETRON 4 MG: 2 INJECTION INTRAMUSCULAR; INTRAVENOUS at 12:42

## 2019-04-28 RX ADMIN — KETOROLAC TROMETHAMINE 30 MG: 30 INJECTION, SOLUTION INTRAMUSCULAR at 17:57

## 2019-04-28 RX ADMIN — LISINOPRIL 20 MG: 20 TABLET ORAL at 08:36

## 2019-04-28 RX ADMIN — SODIUM CHLORIDE 50 MG: 900 INJECTION, SOLUTION INTRAVENOUS at 06:31

## 2019-04-28 RX ADMIN — OXYCODONE HYDROCHLORIDE 15 MG: 5 TABLET ORAL at 11:04

## 2019-04-28 RX ADMIN — ONDANSETRON 4 MG: 2 INJECTION INTRAMUSCULAR; INTRAVENOUS at 17:58

## 2019-04-28 RX ADMIN — KETOROLAC TROMETHAMINE 30 MG: 30 INJECTION, SOLUTION INTRAMUSCULAR at 11:04

## 2019-04-28 NOTE — PROGRESS NOTES
Bedside and Verbal shift change report given to Lilian Patel RN (oncoming nurse) by Yanet Argueta RN (offgoing nurse). Report included the following information SBAR and Kardex.

## 2019-04-28 NOTE — PROGRESS NOTES
[facility] Podiatric Surgery  Progress Note  Subjective:  Simuel Goodness: following for left lower extremity ulcerations with cellulitis. Afebrile. Now having diarrhea. ROS:   Constitutional: Negative for fever, chills, weight loss. Positive for fatigue  HENT: Negative for nosebleeds and congestion. Eyes: Negative for blurred vision and double vision. Respiratory: Negative for cough, shortness of breath and wheezing. Cardiovascular: Negative for chest pain, palpitations, claudication. Positive for leg swelling  Gastrointestinal: Negative for heartburn, nausea, vomiting, abdominal pain. Positive for diarrhea. Genitourinary: Negative for dysuria and urgency. Musculoskeletal: positive for weakness and pain  Skin: positive for wounds  Neurological: Negative for dizziness. Positive for weakness  Endo/Heme/Allergies: Negative for environmental allergies. Does not bruise/bleed easily. 2  Psychiatric/Behavioral: positive for depression    Objective:  Blood pressure 153/83, pulse 61, temperature 97.8 °F (36.6 °C), resp. rate 16, height 6' 1\" (1.854 m), weight 108.9 kg (240 lb), SpO2 91 %. Intake/Output Summary (Last 24 hours) at 4/28/2019 1152  Last data filed at 4/28/2019 0753  Gross per 24 hour   Intake 1631.67 ml   Output 1500 ml   Net 131.67 ml         Physical Exam:  General appearance: alert, cooperative, no distress, appears stated age     Lower Extremity Exam:  Vascular:    Dorsalis Pedis Pulse: present  Posterior Tibialis Pulse: present  Popliteal and Femoral Pulses: present  Skin Temp: warm to warm right and left lower extremities legs to toes  Extremity Edema: +1 pitting with chronic hemosiderin deposition  Varicosities: present     Neurological:  Deep Tendon Reflexes of Achilles and Patellar: diminished but intact right.  Diminished left  Epicritic and Protective Sensations: absent     Deep Pain Response: present     Dermatological:  Toenails: mycotic 1-5 right and left foot  Ulceration:  Ulceration present to dorsal left foot   Measures 2cm x 3cm x0.1cm  Granular wound bed noted. Erythema resolved     Ulceration noted to medial left heel  Measure 4cm x 3.8cm x 0.1cm  Fibrogranular wound bed  Erythema resolved     Rash: absent     Musculoskeletal:  Gait and Station: antalgic and foot drop    Labs:  Lab Results   Component Value Date/Time    WBC 8.0 04/27/2019 12:50 AM    HGB 11.6 (L) 04/27/2019 12:50 AM    HCT 35.6 (L) 04/27/2019 12:50 AM    MCV 91.3 04/27/2019 12:50 AM    PLATELET 101 61/69/1981 12:50 AM     Lab Results   Component Value Date/Time    Sodium 138 04/28/2019 01:49 AM    Potassium 3.8 04/28/2019 01:49 AM    Chloride 105 04/28/2019 01:49 AM    CO2 30 04/28/2019 01:49 AM    BUN 27 (H) 04/28/2019 01:49 AM    Glucose 97 04/28/2019 01:49 AM     Lab Results   Component Value Date/Time    INR 1.1 12/21/2017 11:28 AM     Current Medications:    Current Facility-Administered Medications:     oxyCODONE IR (ROXICODONE) tablet 15 mg, 15 mg, Oral, Q6H PRN, Yovanny Carroll DO, 15 mg at 04/28/19 1104    amLODIPine (NORVASC) tablet 10 mg, 10 mg, Oral, DAILY, Valerie Ellis MD, 10 mg at 04/28/19 0835    LORazepam (ATIVAN) injection 4 mg, 4 mg, IntraVENous, Q1H PRN, Valerie Ellis MD    LORazepam (ATIVAN) injection 2 mg, 2 mg, IntraVENous, Q1H PRN, Valerie Ellis MD    0.9% sodium chloride 1,000 mL with mvi, adult no. 4 with vit K 10 mL, thiamine 927 mg, folic acid 1 mg infusion, , IntraVENous, Q24H, Valerie Ellis MD, Last Rate: 100 mL/hr at 04/27/19 1700    TIGEcycline (TYGACIL) 50 mg in 0.9% sodium chloride (MBP/ADV) 100 mL, 50 mg, IntraVENous, Q12H, Yodit Mills DO, Last Rate: 100 mL/hr at 04/28/19 0631, 50 mg at 04/28/19 0631    cefTRIAXone (ROCEPHIN) 2 g in 0.9% sodium chloride (MBP/ADV) 50 mL, 2 g, IntraVENous, Q24H, Yodit Mills DO, Last Rate: 100 mL/hr at 04/27/19 1716, 2 g at 04/27/19 1716    sertraline (ZOLOFT) tablet 50 mg, 50 mg, Oral, DAILY, Hellen Cadena, NP, 50 mg at 04/28/19 0835    traZODone (DESYREL) tablet 50 mg, 50 mg, Oral, QHS, Mark Sidles, NP, 50 mg at 04/27/19 2135    hydrALAZINE (APRESOLINE) 20 mg/mL injection 10 mg, 10 mg, IntraVENous, Q6H PRN, Pica, Idelle Brigido, DPM    lactobac ac& pc-s.therm-b.anim (YULIA Q/RISAQUAD), 1 Cap, Oral, DAILY, Pica, Idelle Brigido, DPM, 1 Cap at 04/28/19 5660    ketorolac (TORADOL) injection 30 mg, 30 mg, IntraVENous, Q6H, Pica, Idelle Brigido, DPM, 30 mg at 04/28/19 1104    allopurinol (ZYLOPRIM) tablet 100 mg, 100 mg, Oral, DAILY, Pica, Idelle Brigido, DPM, 100 mg at 04/28/19 0835    levothyroxine (SYNTHROID) tablet 88 mcg, 88 mcg, Oral, ACB, Pica, Idelle Brigido, DPM, 88 mcg at 04/28/19 0630    lisinopril (PRINIVIL, ZESTRIL) tablet 20 mg, 20 mg, Oral, DAILY, Pica, Idelle Brigido, DPM, 20 mg at 04/28/19 0836    melatonin tablet 3 mg, 3 mg, Oral, QHS PRN, Pica, Idelle Brigido, DPM, 3 mg at 04/25/19 0426    tamsulosin (FLOMAX) capsule 0.4 mg, 0.4 mg, Oral, DAILY, Pica, Idelle Brigido, DPM, 0.4 mg at 04/27/19 0087    methadone (DOLOPHINE) 10 mg/mL concentrated solution 130 mg, 130 mg, Oral, DAILY, Pica, Idelle Brigido, DPM, 130 mg at 04/28/19 0836    sodium chloride (NS) flush 5-40 mL, 5-40 mL, IntraVENous, Q8H, Pica, Tushar N, DPM, 10 mL at 04/28/19 0630    sodium chloride (NS) flush 5-40 mL, 5-40 mL, IntraVENous, PRN, Pica, Tushar N, DPM, 10 mL at 04/28/19 0630    acetaminophen (TYLENOL) tablet 650 mg, 650 mg, Oral, Q4H PRN, Pica, Idelle Brigido, DPM, 650 mg at 04/28/19 0835    naloxone (NARCAN) injection 0.4 mg, 0.4 mg, IntraVENous, PRN, Pica, Idelle Brigido, DPM    enoxaparin (LOVENOX) injection 40 mg, 40 mg, SubCUTAneous, Q24H, Pica, Idelle Brigido, DPM, 40 mg at 04/28/19 0836    Impression  1. Ulcerations x2 left lower extremity  2. Cellulitis left lower extremity, resolved    Plan:  - wound biopsy pending. Unna boot compression intact without strikethrough. Antibiotics per ID.     - Wounds improving    - Will leave unna boot dressing intact pending biopsy.  I will change tomorrow. If negative for pyoderma or neoplasma, then would need vascular eval for venous reflux. Compression dressing is a must with elevation. Following. Marc Jim.  JENAE Kaur FACFAS FACSt. [facility]as [facility]  Triple Board Certified Foot & Ankle Specialist  [phone] cell

## 2019-04-28 NOTE — PROGRESS NOTES
Gigi Herrelsen [facility] [address]  [address] [facility], [address] [place] [address]  [phone]      Medical Progress Note      NAME: Alek Álvarez   :  1965  MRM:  [id number]    Date/Time: 2019        Assessment / Plan:     Left foot infection/ ulcer: I&D / biopsied . Couldn't tolerate MRI despite sedation/analgesia. CT showed no drainable abscess or e/o osteomyelitis. Wound culture polymicrobial, including enterococcus, Providencia, and Stenotrophomonas. Continue abx per ID, which have been changed to tigecycline and CTX. Per ID, omadacycline (new tetracycline abx) and cefixime may be appropriate for PO deescalation at discharge. Awaiting biopsy; podiatry concerned re: possible pyoderma gangrenosum. Per podiatry, if negative for pyoderma or neoplasma, then would need vascular eval for venous reflux which could be completed on an outpatient basis. Disposition and ongoing outpatient wound care may be barriers to discharge, as pt has been staying with his ex-brother in law and essentially homeless (check today)     HTN: BP slightly better. Cont lisinopril and added Norvasc. IV hydralazine PRN     Chronic pain syndrome: on methadone (confirmed by pharmacy) and oxycodone (increased temporarily due to acute pain but patient informed no pain meds will be given at discharge)    ETOH abuse: per report. Pt denies. No signs of withdrawal on my exam, but has been intermittently agitated. IV Ativan PRN per CIWA protocol however showing no signs of withdrawal. Stop CIWA tomorrow if normal     Gout: ?Flare, on allopurinol. Continue for now, may need to add colchicine       Bladder cancer: s/p surgery in 2018 at [facility]. Stable. follow up outpatient      Hypothyroidism: TSH markedly elevated. FT4 low, T3 okay. Has not been fully comliant Synthroid. Continue Synthroid at current dose for now.  Regardless, repeat TFTs in 4 weeks     Hx of hemorrhagic stroke with left sided hemiparesis: Uses wheelchair and walker at baseline. PT/OT consult. Continue supportive care     Chronic obstructive pulmonary disease (HCC) (). Stable. PRN nebs     Hypokalemia (). Replete and monitor.      Tobacco abuse: recommended smoking cessation    Total time spent: 32 minutes  Time spent in the care of this patient including reviewing records, discussing with nursing and/or other providers on the treatment team, obtaining history and examining the patient, and discussing treatment plans. Care Plan discussed with: Patient, Nursing Staff and >50% of time spent in counseling and coordination of care    Discussed:  Care Plan and D/C Planning    Prophylaxis:  Lovenox    Disposition:  TBD         Subjective:     Chief Complaint:  Follow up foot infection    Chart/notes/labs/studies reviewed, patient examined at bedside. Increasing pain is left foot now that block has completely warn off. Appears uncomfortable at times. Reports ongoing loose stools. Will call his AGNES to see if he can stay with him at discharge         Objective:       Vitals:        Last 24hrs VS reviewed since prior progress note. Most recent are:    Visit Vitals  /83 (BP 1 Location: Left arm, BP Patient Position: Sitting;Post activity)   Pulse 61   Temp 97.8 °F (36.6 °C)   Resp 16   Ht 6' 1\" (1.854 m)   Wt 108.9 kg (240 lb)   SpO2 91%   BMI 31.66 kg/m²     SpO2 Readings from Last 6 Encounters:   04/28/19 91%   10/12/18 96%   09/28/18 95%   08/03/18 100%   08/01/18 95%   06/08/18 97%    O2 Flow Rate (L/min): 8 l/min       Intake/Output Summary (Last 24 hours) at 4/28/2019 1139  Last data filed at 4/28/2019 0753  Gross per 24 hour   Intake 1631.67 ml   Output 1500 ml   Net 131.67 ml          Exam:     Physical Exam:    Gen:  Unkempt. Chronically ill-appearing. NAD  HEENT:  Sclerae nonicteric, hearing intact to voice, mucous membranes moist  Neck:  Supple, without masses.   Resp:  No accessory muscle use, CTAB without wheezes, rales, or rhonchi  Card: RRR, without m/r/g. No LE edema. Abd:  +bowel sounds, soft, NTTP, nondistended. Neuro: Face symmetric, tongue midline, speech fluent, follows commands appropriately. L-sided weakness from prior CVA  Psych:  Alert, oriented x 3. Limited insight  Skin: dressing C/D/I       Medications Reviewed: (see below)    Lab Data Reviewed: (see below)    ______________________________________________________________________    Medications:     Current Facility-Administered Medications   Medication Dose Route Frequency    oxyCODONE IR (ROXICODONE) tablet 15 mg  15 mg Oral Q6H PRN    amLODIPine (NORVASC) tablet 10 mg  10 mg Oral DAILY    LORazepam (ATIVAN) injection 4 mg  4 mg IntraVENous Q1H PRN    LORazepam (ATIVAN) injection 2 mg  2 mg IntraVENous Q1H PRN    0.9% sodium chloride 1,000 mL with mvi, adult no. 4 with vit K 10 mL, thiamine 564 mg, folic acid 1 mg infusion   IntraVENous Q24H    TIGEcycline (TYGACIL) 50 mg in 0.9% sodium chloride (MBP/ADV) 100 mL  50 mg IntraVENous Q12H    cefTRIAXone (ROCEPHIN) 2 g in 0.9% sodium chloride (MBP/ADV) 50 mL  2 g IntraVENous Q24H    sertraline (ZOLOFT) tablet 50 mg  50 mg Oral DAILY    traZODone (DESYREL) tablet 50 mg  50 mg Oral QHS    hydrALAZINE (APRESOLINE) 20 mg/mL injection 10 mg  10 mg IntraVENous Q6H PRN    lactobac ac& pc-s.therm-b.anim (YULIA Q/RISAQUAD)  1 Cap Oral DAILY    ketorolac (TORADOL) injection 30 mg  30 mg IntraVENous Q6H    allopurinol (ZYLOPRIM) tablet 100 mg  100 mg Oral DAILY    levothyroxine (SYNTHROID) tablet 88 mcg  88 mcg Oral ACB    lisinopril (PRINIVIL, ZESTRIL) tablet 20 mg  20 mg Oral DAILY    melatonin tablet 3 mg  3 mg Oral QHS PRN    tamsulosin (FLOMAX) capsule 0.4 mg  0.4 mg Oral DAILY    methadone (DOLOPHINE) 10 mg/mL concentrated solution 130 mg  130 mg Oral DAILY    sodium chloride (NS) flush 5-40 mL  5-40 mL IntraVENous Q8H    sodium chloride (NS) flush 5-40 mL  5-40 mL IntraVENous PRN    acetaminophen (TYLENOL) tablet 650 mg  650 mg Oral Q4H PRN    naloxone (NARCAN) injection 0.4 mg  0.4 mg IntraVENous PRN    enoxaparin (LOVENOX) injection 40 mg  40 mg SubCUTAneous Q24H            Lab Review:     Recent Labs     04/27/19  0050 04/26/19  0025   WBC 8.0 9.4   HGB 11.6* 11.0*   HCT 35.6* 34.0*    192     Recent Labs     04/28/19  0149 04/27/19  0050 04/26/19  0025    141 141   K 3.8 4.0 3.5    104 105   CO2 30 32 32   GLU 97 97 94   BUN 27* 17 9   CREA 0.82 0.66* 0.67*   CA 8.4* 8.5 8.3*   MG 2.0 1.8 1.6   PHOS 4.1 3.9 3.5   ALB 3.5  --  3.6   SGOT  --   --  18   ALT  --   --  27     No components found for: GLPOC  No results for input(s): PH, PCO2, PO2, HCO3, FIO2 in the last 72 hours. No results for input(s): INR in the last 72 hours. No lab exists for component: [place]  Lab Results   Component Value Date/Time    Specimen Description: BLOOD 02/04/2010 10:55 PM    Specimen Description: BLOOD 02/12/2009 11:30 PM     Lab Results   Component Value Date/Time    Culture result: NO GROWTH 5 DAYS 04/23/2019 09:11 PM    Culture result: HEAVY ENTEROCOCCUS FAECALIS GROUP D (A) 04/23/2019 09:11 PM    Culture result: HEAVY PROVIDENCIA RETTGERI (A) 04/23/2019 09:11 PM    Culture result: (A) 04/23/2019 09:11 PM     HEAVY STENOTROPHOMONAS (Christopher Sorrel.) MALTOPHILIA CLSI GUIDELINES DO NOT RECOMMEND REPORTING SUSCEPTIBILITIES FOR S. MALTOPHILIA. TRIMETHOPRIM/SULFAMETHOXAZOLE IS THE DRUG OF CHOICE.     Culture result: HEAVY DIPHTHEROIDS (A) 04/23/2019 09:11 PM              ___________________________________________________    Attending Physician: Keysha Diaz DO

## 2019-04-28 NOTE — ROUTINE PROCESS
..Bedside shift change report given to Walter Nelson RN (oncoming nurse) by Kathy Harper (offgoing nurse). Report included the following information SBAR, Kardex, Intake/Output, MAR, Accordion, Procedure Verification and Quality Measures.

## 2019-04-28 NOTE — PROGRESS NOTES
Problem: Alcohol Withdrawal  Goal: Interventions  Outcome: Progressing Towards Goal     Problem: Patient Education: Go to Patient Education Activity  Goal: Patient/Family Education  Outcome: Progressing Towards Goal     Problem: Falls - Risk of  Goal: *Absence of Falls  Description  Document Donaldo Carson Fall Risk and appropriate interventions in the flowsheet.   Outcome: Progressing Towards Goal

## 2019-04-29 LAB
ANION GAP SERPL CALC-SCNC: 5 MMOL/L (ref 5–15)
BACTERIA SPEC CULT: NORMAL
BUN SERPL-MCNC: 23 MG/DL (ref 6–20)
BUN/CREAT SERPL: 32 (ref 12–20)
CALCIUM SERPL-MCNC: 8.5 MG/DL (ref 8.5–10.1)
CHLORIDE SERPL-SCNC: 107 MMOL/L (ref 97–108)
CO2 SERPL-SCNC: 28 MMOL/L (ref 21–32)
CREAT SERPL-MCNC: 0.73 MG/DL (ref 0.7–1.3)
GLUCOSE SERPL-MCNC: 95 MG/DL (ref 65–100)
MAGNESIUM SERPL-MCNC: 2.1 MG/DL (ref 1.6–2.4)
PHOSPHATE SERPL-MCNC: 4 MG/DL (ref 2.6–4.7)
POTASSIUM SERPL-SCNC: 4 MMOL/L (ref 3.5–5.1)
SERVICE CMNT-IMP: NORMAL
SODIUM SERPL-SCNC: 140 MMOL/L (ref 136–145)

## 2019-04-29 PROCEDURE — 74011250637 HC RX REV CODE- 250/637: Performed by: INTERNAL MEDICINE

## 2019-04-29 PROCEDURE — 80048 BASIC METABOLIC PNL TOTAL CA: CPT

## 2019-04-29 PROCEDURE — 74011250636 HC RX REV CODE- 250/636: Performed by: INTERNAL MEDICINE

## 2019-04-29 PROCEDURE — 36415 COLL VENOUS BLD VENIPUNCTURE: CPT

## 2019-04-29 PROCEDURE — 83735 ASSAY OF MAGNESIUM: CPT

## 2019-04-29 PROCEDURE — 74011250637 HC RX REV CODE- 250/637: Performed by: PODIATRIST

## 2019-04-29 PROCEDURE — 84100 ASSAY OF PHOSPHORUS: CPT

## 2019-04-29 PROCEDURE — 74011250637 HC RX REV CODE- 250/637: Performed by: NURSE PRACTITIONER

## 2019-04-29 PROCEDURE — 65270000029 HC RM PRIVATE

## 2019-04-29 PROCEDURE — 74011000258 HC RX REV CODE- 258: Performed by: INTERNAL MEDICINE

## 2019-04-29 PROCEDURE — 74011250636 HC RX REV CODE- 250/636: Performed by: PODIATRIST

## 2019-04-29 RX ORDER — THERA TABS 400 MCG
1 TAB ORAL DAILY
Status: DISCONTINUED | OUTPATIENT
Start: 2019-04-30 | End: 2019-05-10 | Stop reason: HOSPADM

## 2019-04-29 RX ORDER — ASPIRIN 325 MG/1
100 TABLET, FILM COATED ORAL DAILY
Status: DISCONTINUED | OUTPATIENT
Start: 2019-04-30 | End: 2019-05-10 | Stop reason: HOSPADM

## 2019-04-29 RX ORDER — FOLIC ACID 1 MG/1
1 TABLET ORAL DAILY
Status: DISCONTINUED | OUTPATIENT
Start: 2019-04-30 | End: 2019-05-10 | Stop reason: HOSPADM

## 2019-04-29 RX ADMIN — ENOXAPARIN SODIUM 40 MG: 40 INJECTION SUBCUTANEOUS at 09:04

## 2019-04-29 RX ADMIN — TRAZODONE HYDROCHLORIDE 50 MG: 50 TABLET ORAL at 21:09

## 2019-04-29 RX ADMIN — SERTRALINE HYDROCHLORIDE 50 MG: 50 TABLET ORAL at 08:59

## 2019-04-29 RX ADMIN — ONDANSETRON 4 MG: 2 INJECTION INTRAMUSCULAR; INTRAVENOUS at 18:09

## 2019-04-29 RX ADMIN — Medication 10 ML: at 22:00

## 2019-04-29 RX ADMIN — OXYCODONE HYDROCHLORIDE 15 MG: 5 TABLET ORAL at 16:59

## 2019-04-29 RX ADMIN — LISINOPRIL 20 MG: 20 TABLET ORAL at 08:59

## 2019-04-29 RX ADMIN — ACETAMINOPHEN 650 MG: 325 TABLET ORAL at 11:16

## 2019-04-29 RX ADMIN — PANTOPRAZOLE SODIUM 40 MG: 40 TABLET, DELAYED RELEASE ORAL at 06:31

## 2019-04-29 RX ADMIN — ACETAMINOPHEN 650 MG: 325 TABLET ORAL at 16:06

## 2019-04-29 RX ADMIN — OXYCODONE HYDROCHLORIDE 15 MG: 5 TABLET ORAL at 11:26

## 2019-04-29 RX ADMIN — ALLOPURINOL 100 MG: 100 TABLET ORAL at 08:59

## 2019-04-29 RX ADMIN — LEVOTHYROXINE SODIUM 88 MCG: 88 TABLET ORAL at 06:31

## 2019-04-29 RX ADMIN — CEFTRIAXONE SODIUM 2 G: 2 INJECTION, POWDER, FOR SOLUTION INTRAMUSCULAR; INTRAVENOUS at 17:02

## 2019-04-29 RX ADMIN — TAMSULOSIN HYDROCHLORIDE 0.4 MG: 0.4 CAPSULE ORAL at 08:58

## 2019-04-29 RX ADMIN — Medication 1 CAPSULE: at 09:00

## 2019-04-29 RX ADMIN — AMLODIPINE BESYLATE 10 MG: 5 TABLET ORAL at 08:59

## 2019-04-29 RX ADMIN — SODIUM CHLORIDE 50 MG: 900 INJECTION, SOLUTION INTRAVENOUS at 18:02

## 2019-04-29 RX ADMIN — OXYCODONE HYDROCHLORIDE 15 MG: 5 TABLET ORAL at 22:45

## 2019-04-29 RX ADMIN — METHADONE HYDROCHLORIDE 130 MG: 10 CONCENTRATE ORAL at 08:58

## 2019-04-29 RX ADMIN — Medication 10 ML: at 05:29

## 2019-04-29 RX ADMIN — SODIUM CHLORIDE 50 MG: 900 INJECTION, SOLUTION INTRAVENOUS at 05:47

## 2019-04-29 RX ADMIN — ACETAMINOPHEN 650 MG: 325 TABLET ORAL at 21:09

## 2019-04-29 RX ADMIN — Medication 10 ML: at 17:02

## 2019-04-29 RX ADMIN — OXYCODONE HYDROCHLORIDE 15 MG: 5 TABLET ORAL at 05:27

## 2019-04-29 NOTE — PROGRESS NOTES
Psychiatric note received and faxed to Ms. Anastacio Mclaughlin and MsArnulfo Karena Marek with the [facility] for pt's referral (ph: [phone]/[phone]). Brook Gilford, LCSW    2:15pm:  CM received a call from [facility] Lan, , [address] [place] [address] (ph: [phone]). Pt was assessed today by [facility], and they have accepted pt for placement in their program.  Discharge anticipated for Wednesday per Dr. Lionel Robles.   Brook Gilford, LCSW

## 2019-04-29 NOTE — PROGRESS NOTES
Physical Therapy orders acknowledged, chart reviewed and discussed with nurse patient in pain 9/10 pain scale and asked to come back tomorrow. Notified nurse and agreed to give him some tylenol. We will continue to follow up with the patient for therapy. Thank you.

## 2019-04-29 NOTE — PROGRESS NOTES
[facility] Progress Note           Matt Cabrera DO    [phone] Office  [phone]  Fax    2019      Assessment & Plan:   1. Chronic left lower extremity wounds. Unable to tolerate MRI. CT negative for OM. S/p I&D by podiatry. No cultures sent. Awaiting biopsy results. Cultures growing SMALT, enterococcus and providencia. Patient allergic to sulfa. Organism is intermediate to ceftaz and cipro. Sensitive to tigecycline. Tigecycline has poor activity against providencia so  added ceftriaxone. Omadacycline (new tetracycline abx) and cefixime may be appropriate for PO deescalation at discharge. Will discuss need for 3-phase bone scan with Dr Palmer Tillman  2. Multiple antibiotic allergies include trimethoprim-sulfamethoxazole, ciprofloxacin, and vancomycin. 3.  History of hemorrhagic stroke with left-sided hemiparesis. Subjective:     No new complaints    Objective:     Vitals:   Visit Vitals  BP (!) 149/93 (BP 1 Location: Left arm, BP Patient Position: Sitting)   Pulse 67   Temp 98.4 °F (36.9 °C)   Resp 16   Ht 6' 1\" (1.854 m)   Wt 108.9 kg (240 lb)   SpO2 92%   BMI 31.66 kg/m²        Tmax:  Temp (24hrs), Av.3 °F (36.8 °C), Min:97.8 °F (36.6 °C), Max:98.9 °F (37.2 °C)      Exam:   Patient is intubated:  no    Physical Examination:   General:  Alert. Mildly confused   Head:  Normocephalic, atraumatic. Eyes:  Conjunctivae clear   Neck: Supple       Lungs:   No distress   Chest wall:     Heart:     Abdomen:   Soft, non-tender, non-distended   Extremities: Moves all. Skin: LLE dressed   Neurologic: CNII-XII intact. Labs:        No lab exists for component: ITNL   No results for input(s): CPK, CKMB, TROIQ in the last 72 hours.   Recent Labs     19  0538 19  0149 19  0050    138 141   K 4.0 3.8 4.0    105 104   CO2 28 30 32   BUN 23* 27* 17   CREA 0.73 0.82 0.66*   GLU 95 97 97   PHOS 4.0 4.1 3.9   MG 2.1 2.0 1.8   ALB  --  3.5  --    WBC --   --  8.0   HGB  --   --  11.6*   HCT  --   --  35.6*   PLT  --   --  218     No results for input(s): INR, PTP, APTT in the last 72 hours. No lab exists for component: INREXT, INREXT  Needs: urine analysis, urine sodium, protein and creatinine  No results found for: VIVEK, CREAU      Cultures:     Lab Results   Component Value Date/Time    Specimen Description: BLOOD 02/04/2010 10:55 PM    Specimen Description: BLOOD 02/12/2009 11:30 PM     Lab Results   Component Value Date/Time    Culture result: NO GROWTH 6 DAYS 04/23/2019 09:11 PM    Culture result: HEAVY ENTEROCOCCUS FAECALIS GROUP D (A) 04/23/2019 09:11 PM    Culture result: HEAVY PROVIDENCIA RETTGERI (A) 04/23/2019 09:11 PM    Culture result: (A) 04/23/2019 09:11 PM     HEAVY STENOTROPHOMONAS (Ed Sparrow.) MALTOPHILIA CLSI GUIDELINES DO NOT RECOMMEND [address]. TRIMETHOPRIM/SULFAMETHOXAZOLE IS THE DRUG OF CHOICE. Culture result: HEAVY DIPHTHEROIDS (A) 04/23/2019 09:11 PM       Radiology:     Medications       Current Facility-Administered Medications   Medication Dose Route Frequency Last Dose    oxyCODONE IR (ROXICODONE) tablet 15 mg  15 mg Oral Q6H PRN 15 mg at 04/29/19 1126    nicotine (NICORETTE) gum 4 mg  4 mg Oral Q2H PRN      ondansetron (ZOFRAN) injection 4 mg  4 mg IntraVENous Q6H PRN 4 mg at 04/28/19 1758    pantoprazole (PROTONIX) tablet 40 mg  40 mg Oral ACB 40 mg at 04/29/19 0631    amLODIPine (NORVASC) tablet 10 mg  10 mg Oral DAILY 10 mg at 04/29/19 0859    LORazepam (ATIVAN) injection 4 mg  4 mg IntraVENous Q1H PRN      LORazepam (ATIVAN) injection 2 mg  2 mg IntraVENous Q1H PRN      0.9% sodium chloride 1,000 mL with mvi, adult no. 4 with vit K 10 mL, thiamine 933 mg, folic acid 1 mg infusion   IntraVENous Q24H      TIGEcycline (TYGACIL) 50 mg in 0.9% sodium chloride (MBP/ADV) 100 mL  50 mg IntraVENous Q12H 50 mg at 04/29/19 0547    cefTRIAXone (ROCEPHIN) 2 g in 0.9% sodium chloride (MBP/ADV) 50 mL  2 g IntraVENous Q24H 2 g at 04/28/19 1600    sertraline (ZOLOFT) tablet 50 mg  50 mg Oral DAILY 50 mg at 04/29/19 0859    traZODone (DESYREL) tablet 50 mg  50 mg Oral QHS 50 mg at 04/28/19 2250    hydrALAZINE (APRESOLINE) 20 mg/mL injection 10 mg  10 mg IntraVENous Q6H PRN      lactobac ac& pc-s.therm-b.anim (YULIA Q/RISAQUAD)  1 Cap Oral DAILY 1 Cap at 04/29/19 0900    allopurinol (ZYLOPRIM) tablet 100 mg  100 mg Oral DAILY 100 mg at 04/29/19 0859    levothyroxine (SYNTHROID) tablet 88 mcg  88 mcg Oral ACB 88 mcg at 04/29/19 0631    lisinopril (PRINIVIL, ZESTRIL) tablet 20 mg  20 mg Oral DAILY 20 mg at 04/29/19 0859    melatonin tablet 3 mg  3 mg Oral QHS PRN 3 mg at 04/25/19 0426    tamsulosin (FLOMAX) capsule 0.4 mg  0.4 mg Oral DAILY 0.4 mg at 04/29/19 0858    methadone (DOLOPHINE) 10 mg/mL concentrated solution 130 mg  130 mg Oral DAILY 130 mg at 04/29/19 0858    sodium chloride (NS) flush 5-40 mL  5-40 mL IntraVENous Q8H 10 mL at 04/29/19 0529    sodium chloride (NS) flush 5-40 mL  5-40 mL IntraVENous PRN 10 mL at 04/28/19 0630    acetaminophen (TYLENOL) tablet 650 mg  650 mg Oral Q4H  mg at 04/29/19 1116    naloxone (NARCAN) injection 0.4 mg  0.4 mg IntraVENous PRN      enoxaparin (LOVENOX) injection 40 mg  40 mg SubCUTAneous Q24H 40 mg at 04/29/19 3427           Case discussed with:Left message with Dr Jolanta Schreiber,

## 2019-04-30 PROCEDURE — 74011250636 HC RX REV CODE- 250/636: Performed by: INTERNAL MEDICINE

## 2019-04-30 PROCEDURE — 97530 THERAPEUTIC ACTIVITIES: CPT

## 2019-04-30 PROCEDURE — 97165 OT EVAL LOW COMPLEX 30 MIN: CPT

## 2019-04-30 PROCEDURE — 74011250637 HC RX REV CODE- 250/637: Performed by: INTERNAL MEDICINE

## 2019-04-30 PROCEDURE — 74011250637 HC RX REV CODE- 250/637: Performed by: NURSE PRACTITIONER

## 2019-04-30 PROCEDURE — 74011000258 HC RX REV CODE- 258: Performed by: INTERNAL MEDICINE

## 2019-04-30 PROCEDURE — 74011250637 HC RX REV CODE- 250/637: Performed by: PODIATRIST

## 2019-04-30 PROCEDURE — 94760 N-INVAS EAR/PLS OXIMETRY 1: CPT

## 2019-04-30 PROCEDURE — 65270000029 HC RM PRIVATE

## 2019-04-30 PROCEDURE — 97161 PT EVAL LOW COMPLEX 20 MIN: CPT

## 2019-04-30 PROCEDURE — 74011250636 HC RX REV CODE- 250/636: Performed by: PODIATRIST

## 2019-04-30 PROCEDURE — 97535 SELF CARE MNGMENT TRAINING: CPT

## 2019-04-30 RX ORDER — LISINOPRIL 20 MG/1
40 TABLET ORAL DAILY
Status: DISCONTINUED | OUTPATIENT
Start: 2019-04-30 | End: 2019-05-10 | Stop reason: HOSPADM

## 2019-04-30 RX ADMIN — Medication 10 ML: at 17:30

## 2019-04-30 RX ADMIN — Medication 100 MG: at 08:27

## 2019-04-30 RX ADMIN — CEFTRIAXONE SODIUM 2 G: 2 INJECTION, POWDER, FOR SOLUTION INTRAMUSCULAR; INTRAVENOUS at 06:05

## 2019-04-30 RX ADMIN — SERTRALINE HYDROCHLORIDE 50 MG: 50 TABLET ORAL at 08:28

## 2019-04-30 RX ADMIN — ENOXAPARIN SODIUM 40 MG: 40 INJECTION SUBCUTANEOUS at 08:30

## 2019-04-30 RX ADMIN — TRAZODONE HYDROCHLORIDE 50 MG: 50 TABLET ORAL at 22:08

## 2019-04-30 RX ADMIN — LEVOTHYROXINE SODIUM 88 MCG: 88 TABLET ORAL at 06:12

## 2019-04-30 RX ADMIN — FOLIC ACID 1 MG: 1 TABLET ORAL at 08:30

## 2019-04-30 RX ADMIN — PANTOPRAZOLE SODIUM 40 MG: 40 TABLET, DELAYED RELEASE ORAL at 06:11

## 2019-04-30 RX ADMIN — AMLODIPINE BESYLATE 10 MG: 5 TABLET ORAL at 08:27

## 2019-04-30 RX ADMIN — THERA TABS 1 TABLET: TAB at 08:29

## 2019-04-30 RX ADMIN — OXYCODONE HYDROCHLORIDE 15 MG: 5 TABLET ORAL at 22:08

## 2019-04-30 RX ADMIN — ACETAMINOPHEN 650 MG: 325 TABLET ORAL at 16:12

## 2019-04-30 RX ADMIN — OXYCODONE HYDROCHLORIDE 15 MG: 5 TABLET ORAL at 11:11

## 2019-04-30 RX ADMIN — ACETAMINOPHEN 650 MG: 325 TABLET ORAL at 01:02

## 2019-04-30 RX ADMIN — Medication 10 ML: at 20:32

## 2019-04-30 RX ADMIN — SODIUM CHLORIDE 50 MG: 900 INJECTION, SOLUTION INTRAVENOUS at 06:04

## 2019-04-30 RX ADMIN — TAMSULOSIN HYDROCHLORIDE 0.4 MG: 0.4 CAPSULE ORAL at 08:27

## 2019-04-30 RX ADMIN — SODIUM CHLORIDE 50 MG: 900 INJECTION, SOLUTION INTRAVENOUS at 17:20

## 2019-04-30 RX ADMIN — CEFTRIAXONE SODIUM 2 G: 2 INJECTION, POWDER, FOR SOLUTION INTRAMUSCULAR; INTRAVENOUS at 16:13

## 2019-04-30 RX ADMIN — OXYCODONE HYDROCHLORIDE 15 MG: 5 TABLET ORAL at 04:29

## 2019-04-30 RX ADMIN — METHADONE HYDROCHLORIDE 130 MG: 10 CONCENTRATE ORAL at 08:30

## 2019-04-30 RX ADMIN — Medication 1 CAPSULE: at 08:30

## 2019-04-30 RX ADMIN — LISINOPRIL 40 MG: 20 TABLET ORAL at 08:26

## 2019-04-30 RX ADMIN — ALLOPURINOL 100 MG: 100 TABLET ORAL at 08:30

## 2019-04-30 RX ADMIN — Medication 10 ML: at 22:08

## 2019-04-30 RX ADMIN — ACETAMINOPHEN 650 MG: 325 TABLET ORAL at 20:32

## 2019-04-30 RX ADMIN — OXYCODONE HYDROCHLORIDE 15 MG: 5 TABLET ORAL at 17:19

## 2019-04-30 NOTE — PROGRESS NOTES
4/30/2019  12:17 PM  CM spoke with pharmacist to price omedacacycline. CM was informed pt's insurance does not cover this medication, and [facility] cannot order it as written (reported because it is a new medication). CM contacted [facility] (name brand) to inquire about their assistance program. If pt qualified for program (not likely that pt's insurance will not cover it per pharmacist), it would take a few days for pt to receive it. ID now recommending current IV med. CM completed preliminary referral to [facility] for pricing. Awaiting response.  Pt will be discharged with [facility] and he will be housed at [facility] in [place], [place].

## 2019-04-30 NOTE — PROGRESS NOTES
[facility] Progress Note           Efren Bowen DO    [phone] Office  [phone]  Fax    2019      Assessment & Plan:   1. Chronic left lower extremity wounds. Unable to tolerate MRI. CT negative for OM. S/p I&D by podiatry. No cultures sent. Awaiting biopsy results. Cultures growing SMALT, enterococcus and providencia. Patient allergic to sulfa. Organism is intermediate to ceftaz and cipro. Sensitive to tigecycline. Tigecycline has poor activity against providencia so  added ceftriaxone. Omadacycline (new tetracycline abx) and cefixime may be appropriate for PO deescalation at discharge. Per discussion with Dr Nilsa Anderson there is very low suspicion for OM based on sed rate, CT, and operative findings, Patient unable to tolerate MRI. CM to check on coverage on omadacycline  2. Multiple antibiotic allergies include trimethoprim-sulfamethoxazole, ciprofloxacin, and vancomycin. 3.  History of hemorrhagic stroke with left-sided hemiparesis. Subjective:     No new complaints    Objective:     Vitals:   Visit Vitals  /87 (BP 1 Location: Left arm, BP Patient Position: At rest)   Pulse 65   Temp 97.7 °F (36.5 °C)   Resp 16   Ht 6' 1\" (1.854 m)   Wt 108.9 kg (240 lb)   SpO2 98%   BMI 31.66 kg/m²        Tmax:  Temp (24hrs), Av °F (36.7 °C), Min:97.5 °F (36.4 °C), Max:98.4 °F (36.9 °C)      Exam:   Patient is intubated:  no    Physical Examination:   General:  Alert. NAD   Head:  Normocephalic, atraumatic. Eyes:  Conjunctivae clear   Neck: Supple       Lungs:   No distress   Chest wall:     Heart:     Abdomen:   non-distended   Extremities: Moves all. Skin: LLE dressed   Neurologic: CNII-XII intact. Labs:        No lab exists for component: ITNL   No results for input(s): CPK, CKMB, TROIQ in the last 72 hours.   Recent Labs     19  0538 19  0149    138   K 4.0 3.8    105   CO2 28 30   BUN 23* 27*   CREA 0.73 0.82   GLU 95 97   PHOS 4.0 4.1   MG 2.1 2.0   ALB  --  3.5     No results for input(s): INR, PTP, APTT in the last 72 hours. No lab exists for component: INREXT, INREXT  Needs: urine analysis, urine sodium, protein and creatinine  No results found for: VIVEK, CREAU      Cultures:     Lab Results   Component Value Date/Time    Specimen Description: BLOOD 02/04/2010 10:55 PM    Specimen Description: BLOOD 02/12/2009 11:30 PM     Lab Results   Component Value Date/Time    Culture result: NO GROWTH 6 DAYS 04/23/2019 09:11 PM    Culture result: HEAVY ENTEROCOCCUS FAECALIS GROUP D (A) 04/23/2019 09:11 PM    Culture result: HEAVY PROVIDENCIA RETTGERI (A) 04/23/2019 09:11 PM    Culture result: (A) 04/23/2019 09:11 PM     HEAVY STENOTROPHOMONAS (Luster Friar.) MALTOPHILIA CLSI GUIDELINES DO NOT RECOMMEND [address]. TRIMETHOPRIM/SULFAMETHOXAZOLE IS THE DRUG OF CHOICE.     Culture result: HEAVY DIPHTHEROIDS (A) 04/23/2019 09:11 PM       Radiology:     Medications       Current Facility-Administered Medications   Medication Dose Route Frequency Last Dose    lisinopril (PRINIVIL, ZESTRIL) tablet 40 mg  40 mg Oral DAILY 40 mg at 84/54/68 6062    folic acid (FOLVITE) tablet 1 mg  1 mg Oral DAILY 1 mg at 04/30/19 0830    thiamine mononitrate (B-1) tablet 100 mg  100 mg Oral DAILY 100 mg at 04/30/19 0827    therapeutic multivitamin [facility]) tablet 1 Tab  1 Tab Oral DAILY 1 Tab at 04/30/19 0829    oxyCODONE IR (ROXICODONE) tablet 15 mg  15 mg Oral Q6H PRN 15 mg at 04/30/19 0429    nicotine (NICORETTE) gum 4 mg  4 mg Oral Q2H PRN      ondansetron (ZOFRAN) injection 4 mg  4 mg IntraVENous Q6H PRN 4 mg at 04/29/19 1809    pantoprazole (PROTONIX) tablet 40 mg  40 mg Oral ACB Stopped at 04/30/19 0730    amLODIPine (NORVASC) tablet 10 mg  10 mg Oral DAILY 10 mg at 04/30/19 0827    LORazepam (ATIVAN) injection 4 mg  4 mg IntraVENous Q1H PRN      LORazepam (ATIVAN) injection 2 mg  2 mg IntraVENous Q1H PRN      TIGEcycline (TYGACIL) 50 mg in 0.9% sodium chloride (MBP/ADV) 100 mL  50 mg IntraVENous Q12H 50 mg at 04/30/19 0604    cefTRIAXone (ROCEPHIN) 2 g in 0.9% sodium chloride (MBP/ADV) 50 mL  2 g IntraVENous Q24H 2 g at 04/30/19 6954    sertraline (ZOLOFT) tablet 50 mg  50 mg Oral DAILY 50 mg at 04/30/19 0828    traZODone (DESYREL) tablet 50 mg  50 mg Oral QHS 50 mg at 04/29/19 2109    hydrALAZINE (APRESOLINE) 20 mg/mL injection 10 mg  10 mg IntraVENous Q6H PRN      lactobac ac& pc-s.therm-b.anim (YULIA Q/RISAQUAD)  1 Cap Oral DAILY 1 Cap at 04/30/19 0830    allopurinol (ZYLOPRIM) tablet 100 mg  100 mg Oral DAILY 100 mg at 04/30/19 0830    levothyroxine (SYNTHROID) tablet 88 mcg  88 mcg Oral ACB Stopped at 04/30/19 0730    melatonin tablet 3 mg  3 mg Oral QHS PRN 3 mg at 04/25/19 0426    tamsulosin (FLOMAX) capsule 0.4 mg  0.4 mg Oral DAILY 0.4 mg at 04/30/19 0827    methadone (DOLOPHINE) 10 mg/mL concentrated solution 130 mg  130 mg Oral DAILY 130 mg at 04/30/19 0830    sodium chloride (NS) flush 5-40 mL  5-40 mL IntraVENous Q8H Stopped at 04/30/19 0600    sodium chloride (NS) flush 5-40 mL  5-40 mL IntraVENous PRN 10 mL at 04/28/19 0630    acetaminophen (TYLENOL) tablet 650 mg  650 mg Oral Q4H  mg at 04/30/19 0102    naloxone (NARCAN) injection 0.4 mg  0.4 mg IntraVENous PRN      enoxaparin (LOVENOX) injection 40 mg  40 mg SubCUTAneous Q24H 40 mg at 04/30/19 0830           Case discussed with: Dr Orlando Treviño DO

## 2019-04-30 NOTE — PROGRESS NOTES
Gigi Marino [facility] 79  [address] [facility], [address] [place] [address]  [phone]      Medical Progress Note      NAME: Duong Carter   :  1965  MRM:  [id number]    Date/Time: 2019        Assessment / Plan:     Left foot infection/ ulcer: I&D / biopsied . Couldn't tolerate MRI despite sedation/analgesia. CT showed no drainable abscess or e/o osteomyelitis. Wound culture polymicrobial, including enterococcus, Providencia, and Stenotrophomonas. Continue abx per ID, which have been changed to tigecycline and CTX. Per ID, omadacycline (new tetracycline abx) and cefixime may be appropriate for PO deescalation at discharge. Awaiting biopsy; podiatry concerned re: possible pyoderma gangrenosum. Per podiatry, if negative for pyoderma or neoplasma, then would need vascular eval for venous reflux which could be completed on an outpatient basis. Disposition and ongoing outpatient wound care may be barriers to discharge    HTN: BP slightly better. Cont lisinopril and added Norvasc. IV hydralazine PRN     Chronic pain syndrome: on methadone (confirmed by pharmacy) and oxycodone (increased temporarily due to acute pain but patient informed no pain meds will be given at discharge)    ETOH abuse: per report. Pt denies. No signs of withdrawal on my exam, but has been intermittently agitated but I believe this is behavioral and not EtOH related. Can stop CIWA     Gout: ?Flare, on allopurinol. Continue for now, may need to add colchicine       Bladder cancer: s/p surgery in 2018 at [facility]. Stable. follow up outpatient      Hypothyroidism: TSH markedly elevated. FT4 low, T3 okay. Has not been fully comliant Synthroid. Continue Synthroid at current dose for now. Regardless, repeat TFTs in 4 weeks     Hx of hemorrhagic stroke with left sided hemiparesis: Uses wheelchair and walker at baseline. PT/OT consult. Continue supportive care     Chronic obstructive pulmonary disease (HCC) (). Stable. PRN nebs     Hypokalemia (). Replete and monitor.      Tobacco abuse: recommended smoking cessation    Dispo: Barrier is pathology results and final antibiotic plan. Patient is aware no narcotic Rx at discharge. Total time spent: 32 minutes  Time spent in the care of this patient including reviewing records, discussing with nursing and/or other providers on the treatment team, obtaining history and examining the patient, and discussing treatment plans. Care Plan discussed with: Patient, Nursing Staff and >50% of time spent in counseling and coordination of care    Discussed:  Care Plan and D/C Planning    Prophylaxis:  Lovenox    Disposition:  TBD         Subjective:     Chief Complaint:  Follow up foot infection    Chart/notes/labs/studies reviewed, patient examined at bedside. Reports ongoing pain and difficulty moving around. Denies fevers or chills         Objective:       Vitals:        Last 24hrs VS reviewed since prior progress note. Most recent are:    Visit Vitals  BP (!) 139/91 (BP 1 Location: Left arm, BP Patient Position: Sitting)   Pulse 67   Temp 98.3 °F (36.8 °C)   Resp 16   Ht 6' 1\" (1.854 m)   Wt 108.9 kg (240 lb)   SpO2 96%   BMI 31.66 kg/m²     SpO2 Readings from Last 6 Encounters:   04/30/19 96%   10/12/18 96%   09/28/18 95%   08/03/18 100%   08/01/18 95%   06/08/18 97%    O2 Flow Rate (L/min): 8 l/min       Intake/Output Summary (Last 24 hours) at 4/30/2019 1400  Last data filed at 4/30/2019 0432  Gross per 24 hour   Intake --   Output 800 ml   Net -800 ml          Exam:     Physical Exam:    Gen:  Unkempt. Chronically ill-appearing. NAD  HEENT:  Sclerae nonicteric, hearing intact to voice, mucous membranes moist  Neck:  Supple, without masses. Resp:  No accessory muscle use, CTAB without wheezes, rales, or rhonchi  Card: RRR, without m/r/g. No LE edema. Abd:  +bowel sounds, soft, NTTP, nondistended. Neuro:  Face symmetric, tongue midline, speech fluent, follows commands appropriately. L-sided weakness from prior CVA  Psych:  Alert, oriented x 3.  Limited insight  Skin: dressing C/D/I       Medications Reviewed: (see below)    Lab Data Reviewed: (see below)    ______________________________________________________________________    Medications:     Current Facility-Administered Medications   Medication Dose Route Frequency    lisinopril (PRINIVIL, ZESTRIL) tablet 40 mg  40 mg Oral DAILY    folic acid (FOLVITE) tablet 1 mg  1 mg Oral DAILY    thiamine mononitrate (B-1) tablet 100 mg  100 mg Oral DAILY    therapeutic multivitamin (THERAGRAN) tablet 1 Tab  1 Tab Oral DAILY    oxyCODONE IR (ROXICODONE) tablet 15 mg  15 mg Oral Q6H PRN    nicotine (NICORETTE) gum 4 mg  4 mg Oral Q2H PRN    ondansetron (ZOFRAN) injection 4 mg  4 mg IntraVENous Q6H PRN    pantoprazole (PROTONIX) tablet 40 mg  40 mg Oral ACB    amLODIPine (NORVASC) tablet 10 mg  10 mg Oral DAILY    LORazepam (ATIVAN) injection 4 mg  4 mg IntraVENous Q1H PRN    LORazepam (ATIVAN) injection 2 mg  2 mg IntraVENous Q1H PRN    TIGEcycline (TYGACIL) 50 mg in 0.9% sodium chloride (MBP/ADV) 100 mL  50 mg IntraVENous Q12H    cefTRIAXone (ROCEPHIN) 2 g in 0.9% sodium chloride (MBP/ADV) 50 mL  2 g IntraVENous Q24H    sertraline (ZOLOFT) tablet 50 mg  50 mg Oral DAILY    traZODone (DESYREL) tablet 50 mg  50 mg Oral QHS    hydrALAZINE (APRESOLINE) 20 mg/mL injection 10 mg  10 mg IntraVENous Q6H PRN    lactobac ac& pc-s.therm-b.anim (YULIA Q/RISAQUAD)  1 Cap Oral DAILY    allopurinol (ZYLOPRIM) tablet 100 mg  100 mg Oral DAILY    levothyroxine (SYNTHROID) tablet 88 mcg  88 mcg Oral ACB    melatonin tablet 3 mg  3 mg Oral QHS PRN    tamsulosin (FLOMAX) capsule 0.4 mg  0.4 mg Oral DAILY    methadone (DOLOPHINE) 10 mg/mL concentrated solution 130 mg  130 mg Oral DAILY    sodium chloride (NS) flush 5-40 mL  5-40 mL IntraVENous Q8H    sodium chloride (NS) flush 5-40 mL  5-40 mL IntraVENous PRN    acetaminophen (TYLENOL) tablet 650 mg  650 mg Oral Q4H PRN    naloxone (NARCAN) injection 0.4 mg  0.4 mg IntraVENous PRN    enoxaparin (LOVENOX) injection 40 mg  40 mg SubCUTAneous Q24H            Lab Review:     No results for input(s): WBC, HGB, HCT, PLT, HGBEXT, HCTEXT, PLTEXT, HGBEXT, HCTEXT, PLTEXT in the last 72 hours. Recent Labs     04/29/19  0538 04/28/19  0149    138   K 4.0 3.8    105   CO2 28 30   GLU 95 97   BUN 23* 27*   CREA 0.73 0.82   CA 8.5 8.4*   MG 2.1 2.0   PHOS 4.0 4.1   ALB  --  3.5     No components found for: GLPOC  No results for input(s): PH, PCO2, PO2, HCO3, FIO2 in the last 72 hours. No results for input(s): INR in the last 72 hours. No lab exists for component: Puma Argueta  Lab Results   Component Value Date/Time    Specimen Description: BLOOD 02/04/2010 10:55 PM    Specimen Description: BLOOD 02/12/2009 11:30 PM     Lab Results   Component Value Date/Time    Culture result: NO GROWTH 6 DAYS 04/23/2019 09:11 PM    Culture result: HEAVY ENTEROCOCCUS FAECALIS GROUP D (A) 04/23/2019 09:11 PM    Culture result: HEAVY PROVIDENCIA RETTGERI (A) 04/23/2019 09:11 PM    Culture result: (A) 04/23/2019 09:11 PM     HEAVY STENOTROPHOMONAS (Singh Puja.) MALTOPHILIA CLSI GUIDELINES DO NOT RECOMMEND REPORTING SUSCEPTIBILITIES FOR S. MALTOPHILIA. TRIMETHOPRIM/SULFAMETHOXAZOLE IS THE DRUG OF CHOICE.     Culture result: HEAVY DIPHTHEROIDS (A) 04/23/2019 09:11 PM              ___________________________________________________    Attending Physician: Maryann Starr DO

## 2019-04-30 NOTE — PROGRESS NOTES
Problem: Mobility Impaired (Adult and Pediatric)  Goal: *Acute Goals and Plan of Care (Insert Text)  Description  Physical Therapy Goals  Initiated 4/30/2019  1. Patient will move from supine to sit and sit to supine  in bed with modified independence within 7 day(s). 2.  Patient will transfer from bed to chair and chair to bed with modified independence using the least restrictive device within 7 day(s). 3.  Patient will perform sit to stand with modified independence within 7 day(s). 4.  Patient will ambulate with modified independence for 150 feet with the least restrictive device within 7 day(s). 5.  Patient will ascend/descend 2 stairs with 1 handrail(s) with modified independence within 7 day(s). Outcome: Progressing Towards Goal   PHYSICAL THERAPY EVALUATION  Patient: Melody Ludwig (8 y.o. male)  Date: 4/30/2019  Primary Diagnosis: Foot infection [L08.9]  Procedure(s) (LRB):  IRRIGATION AND DEBRIDEMENT LEFT FOOT, BIOPSY LEFT FOOT (POP/SAPH BLOCK) (Left) 5 Days Post-Op   Precautions:   Fall    ASSESSMENT :  Based on the objective data described below, the patient presents with impaired balance and increased pain following admission for foot infection. Patient is POD 5 s/p I & D of L foot with biopsy. He has order for unna boot with no WBing restrictions found in chart. He has history of CVA with resulting L hemiparesis. Pt reports he lives with a brother in law with two steps to enter. History of inpatient psych admissions. Pt stated he uses a cane at home but otherwise has no other AD/DME. Pt is alone for portions of the day. Increased pain with mobility but able to ambulate 12ft within the room using SPC. Pt declining further mobility d/t pain. Feel he is near his baseline. Per CM note pt will be discharging to [address] He would benefit from HHPT follow up and supervision from family members for safety.     Patient will benefit from skilled intervention to address the above impairments. Patient?s rehabilitation potential is considered to be Fair  Factors which may influence rehabilitation potential include:   ? None noted  ? Mental ability/status  ? Medical condition  ? Home/family situation and support systems  ? Safety awareness  ? Pain tolerance/management  ? Other:      PLAN :  Recommendations and Planned Interventions:  ?           Bed Mobility Training             ? Neuromuscular Re-Education  ? Transfer Training                   ? Orthotic/Prosthetic Training  ? Gait Training                         ? Modalities  ? Therapeutic Exercises           ? Edema Management/Control  ? Therapeutic Activities            ? Patient and Family Training/Education  ? Other (comment):    Frequency/Duration: Patient will be followed by physical therapy  5 times a week to address goals. Discharge Recommendations: Home Health  Further Equipment Recommendations for Discharge: straight cane     SUBJECTIVE:   Patient stated ? I can't use a walker. .?     OBJECTIVE DATA SUMMARY:   HISTORY:    Past Medical History:   Diagnosis Date    Aneurysm ([address]     (with stroke)    Bladder tumor     Cancer ([address]     bladder CA    Chronic pain     Family history of bladder cancer     Gout     History of vascular access device 04/25/2019    [facility] VAT 4 FR MIDLINE R Cephalic Limited access    History of vascular access device 04/26/2019    4 fr Midline right Cephalic midline access    Hypercholesterolemia     Hypertension     Lesion of bladder     Seizures ([address]     Stroke ([address] 2006    left sided weakness    Thromboembolus ([address]     Thyroid disease     TIA (transient ischemic attack) 2012     Past Surgical History:   Procedure Laterality Date    HX ORTHOPAEDIC      R arthroscopy    HX OTHER SURGICAL      x2 L foot    HX UROLOGICAL  04/20/2018    Cystoscopy, TURBT (greater than 5 cm resected)  [facility]. KNEE ARTHROSCP HARV      left knee    TRANSURETHRAL RESEC BLADDER NECK  08/10/2018     Prior Level of Function/Home Situation: Lives with family on the first floor of a two story house. Multiple hospital admissions  Personal factors and/or comorbidities impacting plan of care: cancer, aneurysm, gout, embolus    Home Situation  Home Environment: Private residence  # Steps to Enter: 2  Rails to Enter: Yes  Hand Rails : Right  One/Two Story Residence: Two story, live on 1st floor  Living Alone: No  Support Systems: Family member(s)  Patient Expects to be Discharged to[de-identified] Private residence  Current DME Used/Available at Home[place] straight  Tub or Shower Type: Shower    EXAMINATION/PRESENTATION/DECISION MAKING:   Critical Behavior:  Neurologic State: Alert  Orientation Level: Oriented X4  Cognition: Follows commands  Safety/Judgement: Awareness of environment, Decreased awareness of need for safety, Insight into deficits  Hearing: Auditory  Auditory Impairment: None  Skin:    Edema:   Range Of Motion:  AROM: Generally decreased, functional(R side)                       Strength:    Strength: Generally decreased, functional(L side non functional)                    Tone & Sensation:   Tone: Abnormal                              Coordination:  Coordination: Generally decreased, functional  Vision:      Functional Mobility:  Bed Mobility:     Supine to Sit: Stand-by assistance     Scooting: Supervision  Transfers:  Sit to Stand: Contact guard assistance  Stand to Sit: Contact guard assistance        Bed to Chair: Minimum assistance(for occasional steadying)              Balance:   Sitting: Intact  Standing: Impaired  Standing - Static: Good  Standing - Dynamic : Fair  Ambulation/Gait Training:  Distance (ft): 12 Feet (ft)  Assistive Device: Gait belt;Cane, straight  Ambulation - Level of Assistance: Minimal assistance        Gait Abnormalities: Antalgic;Hemiplegic; Step to gait        Base of Support: Center of gravity altered     Speed/Reva: Delayed  Step Length: Right shortened;Left shortened  Swing Pattern: Left asymmetrical                  Stairs: Therapeutic Exercises:       Functional Measure:  Barthel Index:    Bathin  Bladder: 10  Bowels: 10  Groomin  Dressin  Feeding: 10  Mobility: 10  Stairs: 5  Toilet Use: 5  Transfer (Bed to Chair and Back): 10  Total: 75/100       Percentage of impairment   0%   1-19%   20-39%   40-59%   60-79%   80-99%   100%   Barthel Score 0-100 100 99-80 79-60 59-40 20-39 1-19   0     The Barthel ADL Index: Guidelines  1. The index should be used as a record of what a patient does, not as a record of what a patient could do. 2. The main aim is to establish degree of independence from any help, physical or verbal, however minor and for whatever reason. 3. The need for supervision renders the patient not independent. 4. A patient's performance should be established using the best available evidence. Asking the patient, friends/relatives and nurses are the usual sources, but direct observation and common sense are also important. However direct testing is not needed. 5. Usually the patient's performance over the preceding 24-48 hours is important, but occasionally longer periods will be relevant. 6. Middle categories imply that the patient supplies over 50 per cent of the effort. 7. Use of aids to be independent is allowed. Link Heir., Barthel, D.W. ([id number]). Functional evaluation: the Barthel Index. [address] [facility] (14)2. ISAI Garcia, Adrian Ya., Lily Enamorado., [place], [address] [place] [address] (). Measuring the change indisability after inpatient rehabilitation; comparison of the responsiveness of the Barthel Index and Functional Walker Measure. Journal of Neurology, Neurosurgery, and Psychiatry, 66(4), 950-343.   Rosalia Lynne, N.J.A, Honey Geiger  WArnulfoJ.M, & Anya Andino MArnulfoA. (2004.) Assessment of post-stroke quality of life in cost-effectiveness studies: The usefulness of the Barthel Index and the EuroQoL-5D. Quality of Life Research, 15, 422-44            Physical Therapy Evaluation Charge Determination   History Examination Presentation Decision-Making   MEDIUM  Complexity : 1-2 comorbidities / personal factors will impact the outcome/ POC  MEDIUM Complexity : 3 Standardized tests and measures addressing body structure, function, activity limitation and / or participation in recreation  LOW Complexity : Stable, uncomplicated  Other outcome measures Barthel  LOW       Based on the above components, the patient evaluation is determined to be of the following complexity level: LOW     Pain:  Pain Scale 1: Numeric (0 - 10)  Pain Intensity 1: 8              Activity Tolerance:   Fair  Please refer to the flowsheet for vital signs taken during this treatment. After treatment:   ?         Patient left in no apparent distress sitting up in chair  ? Patient left in no apparent distress in bed  ? Call bell left within reach  ? Nursing notified  ? Caregiver present  ? Bed alarm activated    COMMUNICATION/EDUCATION:   The patient?s plan of care was discussed with: Registered Nurse. ?         Fall prevention education was provided and the patient/caregiver indicated understanding. ? Patient/family have participated as able in goal setting and plan of care. ?         Patient/family agree to work toward stated goals and plan of care. ?         Patient understands intent and goals of therapy, but is neutral about his/her participation. ? Patient is unable to participate in goal setting and plan of care.     Thank you for this referral.  Maranda Harry, PT   Time Calculation: 25 mins

## 2019-04-30 NOTE — PROGRESS NOTES
Bedside and Verbal shift change report given to Kenneth Martell RN (oncoming nurse) by Sandy Petersen RN (offgoing nurse). Report included the following information SBAR, Kardex, Procedure Summary, Intake/Output, MAR and Recent Results.

## 2019-04-30 NOTE — PROGRESS NOTES
Problem: Self Care Deficits Care Plan (Adult)  Goal: *Acute Goals and Plan of Care (Insert Text)  Description  Occupational Therapy Goals  Initiated 4/30/2019  1. Patient will perform lower body dressing with modified independence within 7 day(s). 2.  Patient will perform bathing with modified independence within 7 day(s). 3.  Patient will perform grooming, standing at sink, with modified independence within 7 day(s). 4.  Patient will perform toilet transfers with modified independence within 7 day(s). 5.  Patient will perform all aspects of toileting with modified independence within 7 day(s). 6.  Patient will participate in upper extremity therapeutic exercise/activities with supervision/set-up for 10 minutes within 7 day(s). 7.  Patient will utilize energy conservation techniques during functional activities without cues within 7 day(s). Outcome: Progressing Towards Goal     OCCUPATIONAL THERAPY EVALUATION  Patient: Scotty Oliver (5 y.o. male)  Date: 4/30/2019  Primary Diagnosis: Foot infection [L08.9]  Procedure(s) (LRB):  IRRIGATION AND DEBRIDEMENT LEFT FOOT, BIOPSY LEFT FOOT (POP/SAPH BLOCK) (Left) 5 Days Post-Op   Precautions:  Fall    ASSESSMENT :  Based on the objective data described below, the patient presents with impaired balance and increased pain following admission for foot infection. Patient is POD 5 s/p I & D of L foot with biopsy. He has order for unna boot with no WBing restrictions. He has history of CVA with resulting L noe body. Patient is received sitting EOB performing LB dressing and bathing. He is noted to demonstrate good problem solving, using R UE to don over feet. In standing, requires min A to pull up and for steadying. He performs mobility in room with use of SPC, noted some instability during turns. He is agreeable to sit up in chair with B LEs elevated at end of session. At home, patient lives with his brother-in-law and is alone during the day.  He reports mod I for ADLs PTA. Patient is likely close to his baseline for ADL performance and mobility, however would benefit from continued skilled services due to limitations and pain from foot. Recommend HH therapy at discharge. Patient will benefit from skilled intervention to address the above impairments. Patient?s rehabilitation potential is considered to be Good  Factors which may influence rehabilitation potential include:   ? None noted  ? Mental ability/status  ? Medical condition  ? Home/family situation and support systems  ? Safety awareness  ? Pain tolerance/management  ? Other:      PLAN :  Recommendations and Planned Interventions:  ?               Self Care Training                  ? Therapeutic Activities  ? Functional Mobility Training    ? Cognitive Retraining  ? Therapeutic Exercises           ? Endurance Activities  ? Balance Training                   ? Neuromuscular Re-Education  ? Visual/Perceptual Training     ? Home Safety Training  ? Patient Education                 ? Family Training/Education  ? Other (comment):    Frequency/Duration: Patient will be followed by occupational therapy 5 times a week to address goals. Discharge Recommendations: Home Health  Further Equipment Recommendations for Discharge: TBD; none anticipated at this time      SUBJECTIVE:   Patient stated ? I manage just fine at home. ? RN cleared patient for therapy. Patient agreeable to participate.      OBJECTIVE DATA SUMMARY:   HISTORY:   Past Medical History:   Diagnosis Date    Aneurysm ([facility] [address]     (with stroke)    Bladder tumor     Cancer ([facility] [address]     bladder CA    Chronic pain     Family history of bladder cancer     Gout     History of vascular access device 04/25/2019    [facility] VAT 4 FR MIDLINE R Cephalic Limited access    History of vascular access device 04/26/2019    4 fr Midline right Cephalic midline access    Hypercholesterolemia     Hypertension     Lesion of bladder     Seizures ([facility] [address]     Stroke [facility]) 2006    left sided weakness    Thromboembolus ([facility] [address]     Thyroid disease     TIA (transient ischemic attack) 2012     Past Surgical History:   Procedure Laterality Date    HX ORTHOPAEDIC      R arthroscopy    HX OTHER SURGICAL      x2 L foot    HX UROLOGICAL  04/20/2018    Cystoscopy, TURBT (greater than 5 cm resected) Dr. Allyn Severs, [facility]. KNEE ARTHROSCP HARV      left knee    TRANSURETHRAL RESEC BLADDER NECK  08/10/2018     Prior Level of Function/Environment/Context: Patient reports mod I at baseline. Stands in his shower to bathe. Uses SPC. Expanded or extensive additional review of patient history:     Home Situation  Home Environment: Private residence  # Steps to Enter: 2  Rails to Enter: Yes  Hand Rails : Right  One/Two Story Residence: Two story, live on 1st floor  Living Alone: No  Support Systems: Family member(s)  Patient Expects to be Discharged to[de-identified] Private residence  Current DME Used/Available at Home: Sherol Grant, straight  Tub or Shower Type: Shower    Hand dominance: Right    EXAMINATION OF PERFORMANCE DEFICITS:  Cognitive/Behavioral Status:  Neurologic State: Alert  Orientation Level: Oriented X4  Cognition: Follows commands  Perception: Appears intact  Perseveration: No perseveration noted  Safety/Judgement: Awareness of environment;Decreased awareness of need for safety; Insight into deficits    Skin: observed skin intact. Dressing on L LE intact. Edema: no abnormal swelling noted. Hearing: Auditory  Auditory Impairment: None    Range of Motion:  AROM: Grossly decreased, non-functional (in L UE due to h/o CVA; R UE WFL)    Strength:  Strength:  Within functional limits(in R UE: L UE grossly decreased due to h/o CVA)    Coordination:  Coordination: Within functional limits(in R UE; L UE grossly decreased)  Fine Motor Skills-Upper: Left Impaired;Right Intact    Gross Motor Skills-Upper: Left Impaired;Right Intact    Tone:  Tone: Abnormal(noted increased tone in L UE)    Balance:  Sitting: Intact  Standing: Impaired; With support(with SPC)  Standing - Static: Good  Standing - Dynamic : Fair(noted instability during turn)    Functional Mobility and Transfers for ADLs:  Bed Mobility:  Scooting: Supervision    Transfers:  Sit to Stand: Contact guard assistance;Minimum assistance  Stand to Sit: Contact guard assistance  Bed to Chair: Minimum assistance(for occasional steadying)    ADL Assessment:  Feeding: Modified independent;Setup    Oral Facial Hygiene/Grooming: Modified Independent    Bathing: Supervision    Upper Body Dressing: Modified independent    Lower Body Dressing: Minimum assistance(in standing to pull up undergarments)    Toileting: Minimum assistance    ADL Intervention and task modifications:  Lower Body Bathing  Bathing Assistance: Stand-by assistance  Perineal  : Stand-by assistance  Position Performed: Seated in chair    Lower Body Dressing Assistance  Dressing Assistance: Minimum assistance  Underpants: Minimum assistance  Position Performed: Seated edge of bed  Cues: Physical assistance;Verbal cues provided  Instructed patient to perform LB dressing in seated position for fall prevention. Instructed and demonstrated to dress/undress L LE first/last for pain control with CGA. Instructed on proper pacing during standing portion to pull up undergarments for safety; required min assist in standing for steadying. Cognitive Retraining  Safety/Judgement: Awareness of environment;Decreased awareness of need for safety; Insight into deficits    Functional Measure:  Barthel Index:    Bathin  Bladder: 10  Bowels: 10  Groomin  Dressin  Feeding: 10  Mobility: 10  Stairs: 5  Toilet Use: 5  Transfer (Bed to Chair and Back): 10  Total: 75/100        Percentage of impairment   0%   1-19%   20-39%   40-59%   60-79% 80-99%   100%   Barthel Score 0-100 100 99-80 79-60 59-40 20-39 1-19   0     The Barthel ADL Index: Guidelines  1. The index should be used as a record of what a patient does, not as a record of what a patient could do. 2. The main aim is to establish degree of independence from any help, physical or verbal, however minor and for whatever reason. 3. The need for supervision renders the patient not independent. 4. A patient's performance should be established using the best available evidence. Asking the patient, friends/relatives and nurses are the usual sources, but direct observation and common sense are also important. However direct testing is not needed. 5. Usually the patient's performance over the preceding 24-48 hours is important, but occasionally longer periods will be relevant. 6. Middle categories imply that the patient supplies over 50 per cent of the effort. 7. Use of aids to be independent is allowed. Clarissa Birch., Barthel, D.W. ([id number]). Functional evaluation: the Barthel Index. [facility] (14)2. Hamzah Vela balwinder ISAI Thompson, Jaskaran Cai., Toro Mario., [place], [address] [place] [address] (1999). Measuring the change indisability after inpatient rehabilitation; comparison of the responsiveness of the Barthel Index and Functional Stanford Measure. Journal of Neurology, Neurosurgery, and Psychiatry, 66(4), 033-003. DORCAS Moscoso, ELEANOR Hines, & Carissa Landa M.A. (2004.) Assessment of post-stroke quality of life in cost-effectiveness studies: The usefulness of the Barthel Index and the EuroQoL-5D.  Quality of Life Research, 15, 308-98     Occupational Therapy Evaluation Charge Determination   History Examination Decision-Making   LOW Complexity : Brief history review  MEDIUM Complexity : 3-5 performance deficits relating to physical, cognitive , or psychosocial skils that result in activity limitations and / or participation restrictions HIGH Complexity : Patient presents with comorbidities that affect occupational performance. Signifigant modification of tasks or assistance (eg, physical or verbal) with assessment (s) is necessary to enable patient to complete evaluation       Based on the above components, the patient evaluation is determined to be of the following complexity level: LOW   Pain:  Pain Scale 1: Numeric (0 - 10)  Pain Intensity 1: 8  Pain Location 1: Foot  Pain Orientation 1: Left  Pain Description 1: Aching;Cramping; Throbbing  Pain Intervention(s) 1: RN notified. Patient positioned in chair with B LEs elevated. Activity Tolerance:   Patient tolerated activity well. Denied feeling lightheaded or dizzy during session. After treatment:   ? Patient left in no apparent distress sitting up in chair  ? Patient left in no apparent distress in bed  ? Call bell left within reach  ? Nursing notified  ? Caregiver present  ? Bed alarm activated    COMMUNICATION/EDUCATION:   The patient?s plan of care was discussed with: Physical Therapist and Registered Nurse.  ? Home safety education was provided and the patient/caregiver indicated understanding. ? Patient/family have participated as able in goal setting and plan of care. ? Patient/family agree to work toward stated goals and plan of care. ? Patient understands intent and goals of therapy, but is neutral about his/her participation. ? Patient is unable to participate in goal setting and plan of care. This patient?s plan of care is appropriate for delegation to [facility].     Thank you for this referral.  Jin Merino OT  Time Calculation: 24 mins

## 2019-04-30 NOTE — PROGRESS NOTES
The [facility] Podiatric Surgery  Progress Note  Subjective:  Jessica Unger: following for left lower extremity ulcerations with cellulitis. Afebrile. ROS:   Constitutional: Negative for fever, chills, weight loss. Positive for fatigue  HENT: Negative for nosebleeds and congestion. Eyes: Negative for blurred vision and double vision. Respiratory: Negative for cough, shortness of breath and wheezing. Cardiovascular: Negative for chest pain, palpitations, claudication. Positive for leg swelling  Gastrointestinal: Negative for heartburn, nausea, vomiting, abdominal pain. Positive for diarrhea. Genitourinary: Negative for dysuria and urgency. Musculoskeletal: positive for weakness and pain  Skin: positive for wounds  Neurological: Negative for dizziness. Positive for weakness  Endo/Heme/Allergies: Negative for environmental allergies. Does not bruise/bleed easily. 2  Psychiatric/Behavioral: positive for depression    Objective:  Blood pressure 128/77, pulse 62, temperature 98.3 °F (36.8 °C), resp. rate 16, height 6' 1\" (1.854 m), weight 108.9 kg (240 lb), SpO2 95 %. Intake/Output Summary (Last 24 hours) at 4/29/2019 2155  Last data filed at 4/29/2019 1214  Gross per 24 hour   Intake --   Output 600 ml   Net -600 ml         Physical Exam:  General appearance: alert, cooperative, no distress, appears stated age     Lower Extremity Exam:  Vascular:    Dorsalis Pedis Pulse: present  Posterior Tibialis Pulse: present  Popliteal and Femoral Pulses: present  Skin Temp: warm to warm right and left lower extremities legs to toes  Extremity Edema: +1 pitting with chronic hemosiderin deposition  Varicosities: present     Neurological:  Deep Tendon Reflexes of Achilles and Patellar: diminished but intact right.  Diminished left  Epicritic and Protective Sensations: absent     Deep Pain Response: present     Dermatological:  Toenails: mycotic 1-5 right and left foot  Ulceration:  Dressing intact without strikethrough  Ulceration present to dorsal left foot   Measures 2cm x 2.5cm x0.1cm  Granular wound bed noted.   Erythema resolved     Ulceration noted to medial left heel  Measure 4cm x 3.5cm x 0.1cm  Fibrogranular wound bed  Erythema resolved     Rash: absent     Musculoskeletal:  Gait and Station: antalgic and foot drop    Labs:  Lab Results   Component Value Date/Time    WBC 8.0 04/27/2019 12:50 AM    HGB 11.6 (L) 04/27/2019 12:50 AM    HCT 35.6 (L) 04/27/2019 12:50 AM    MCV 91.3 04/27/2019 12:50 AM    PLATELET 541 71/86/4867 12:50 AM     Lab Results   Component Value Date/Time    Sodium 140 04/29/2019 05:38 AM    Potassium 4.0 04/29/2019 05:38 AM    Chloride 107 04/29/2019 05:38 AM    CO2 28 04/29/2019 05:38 AM    BUN 23 (H) 04/29/2019 05:38 AM    Glucose 95 04/29/2019 05:38 AM     Lab Results   Component Value Date/Time    INR 1.1 12/21/2017 11:28 AM     Current Medications:    Current Facility-Administered Medications:     [START ON 6/06/5335] folic acid (FOLVITE) tablet 1 mg, 1 mg, Oral, DAILY, Ileana Quinn,     [START ON 4/30/2019] thiamine mononitrate (B-1) tablet 100 mg, 100 mg, Oral, DAILY, Ileana Quinn, DO    [START ON 4/30/2019] therapeutic multivitamin (THERAGRAN) tablet 1 Tab, 1 Tab, Oral, DAILY, Ileana Quinn, DO    oxyCODONE IR (ROXICODONE) tablet 15 mg, 15 mg, Oral, Q6H PRN, Yovanny Quinn, DO, 15 mg at 04/29/19 1659    nicotine (NICORETTE) gum 4 mg, 4 mg, Oral, Q2H PRN, Ileana Quinnin, DO    ondansetron [facility]) injection 4 mg, 4 mg, IntraVENous, Q6H PRN, Jennifer Quinn Lown V, DO, 4 mg at 04/29/19 1809    pantoprazole (PROTONIX) tablet 40 mg, 40 mg, Oral, ACB, Vickie Holt, Yovanny QUINONES DO, 40 mg at 04/29/19 0631    amLODIPine (NORVASC) tablet 10 mg, 10 mg, Oral, DAILY, Jaya Beltre MD, 10 mg at 04/29/19 0859    LORazepam (ATIVAN) injection 4 mg, 4 mg, IntraVENous, Q1H PRN, Jaya Beltre MD    LORazepam (ATIVAN) injection 2 mg, 2 mg, IntraVENous, Q1H PRN, Lottie Bingham, Reji Winchester MD    TIGEcycline (TYGACIL) 50 mg in 0.9% sodium chloride (MBP/ADV) 100 mL, 50 mg, IntraVENous, Q12H, Cory Harper, DO, Last Rate: 100 mL/hr at 04/29/19 1802, 50 mg at 04/29/19 1802    cefTRIAXone (ROCEPHIN) 2 g in 0.9% sodium chloride (MBP/ADV) 50 mL, 2 g, IntraVENous, Q24H, Cory Harper, DO, Last Rate: 100 mL/hr at 04/29/19 1702, 2 g at 04/29/19 1702    sertraline (ZOLOFT) tablet 50 mg, 50 mg, Oral, DAILY, Evia Spore, NP, 50 mg at 04/29/19 0859    traZODone (DESYREL) tablet 50 mg, 50 mg, Oral, QHS, Evia Spore, NP, 50 mg at 04/29/19 2109    hydrALAZINE (APRESOLINE) 20 mg/mL injection 10 mg, 10 mg, IntraVENous, Q6H PRN, Pica, Danella Rodriguez, DPM    lactobac ac& pc-s.therm-b.anim (YULIA Q/RISAQUAD), 1 Cap, Oral, DAILY, Pica, Danella Rodriguez, DPM, 1 Cap at 04/29/19 0900    allopurinol (ZYLOPRIM) tablet 100 mg, 100 mg, Oral, DAILY, Pica, Danella Rodriguez, DPM, 100 mg at 04/29/19 0859    levothyroxine (SYNTHROID) tablet 88 mcg, 88 mcg, Oral, ACB, Pica, Danella Rodriguez, DPM, 88 mcg at 04/29/19 0631    lisinopril (PRINIVIL, ZESTRIL) tablet 20 mg, 20 mg, Oral, DAILY, Pica, Danella Rodriguez, DPM, 20 mg at 04/29/19 0859    melatonin tablet 3 mg, 3 mg, Oral, QHS PRN, Pica, Danella Rodriguez, DPM, 3 mg at 04/25/19 0426    tamsulosin (FLOMAX) capsule 0.4 mg, 0.4 mg, Oral, DAILY, Pica, Danella Rodriguez, DPM, 0.4 mg at 04/29/19 0858    methadone (DOLOPHINE) 10 mg/mL concentrated solution 130 mg, 130 mg, Oral, DAILY, Pica, Joseella Rodriguez, DPM, 130 mg at 04/29/19 0858    sodium chloride (NS) flush 5-40 mL, 5-40 mL, IntraVENous, Q8H, Pica, Tushar N, DPM, 10 mL at 04/29/19 1702    sodium chloride (NS) flush 5-40 mL, 5-40 mL, IntraVENous, PRN, Pica, Tushar N, DPM, 10 mL at 04/28/19 0630    acetaminophen (TYLENOL) tablet 650 mg, 650 mg, Oral, Q4H PRN, Pica, Joseella Rodriguez, DPM, 650 mg at 04/29/19 2109    naloxone (NARCAN) injection 0.4 mg, 0.4 mg, IntraVENous, PRN, Pica, Danella Rodriguez, DPM    enoxaparin (LOVENOX) injection 40 mg, 40 mg, SubCUTAneous, Q24H, Pica, Keanu Hess DPM, 40 mg at 04/29/19 0904    Impression  1. Ulcerations x2 left lower extremity  2. Cellulitis left lower extremity, resolved    Plan:  - wound biopsy still pending. Compression dressing applied. Unna boot wrap ordered. Antibiotics per ID. Discussed 2 week course post discharge with Dr. Edy Donald.    - Wounds improving    - If negative for pyoderma or neoplasma, then would need vascular eval for venous reflux. Compression dressing is a must with elevation. Following. Keanu Hess.  JENAE Kaur FACFAS FACWS [facility]  Triple Board Certified Foot & Ankle Specialist  [phone] cell

## 2019-05-01 ENCOUNTER — TELEPHONE (OUTPATIENT)
Dept: FAMILY MEDICINE CLINIC | Age: 54
End: 2019-05-01

## 2019-05-01 LAB
BUN SERPL-MCNC: 22 MG/DL (ref 6–20)
CREAT SERPL-MCNC: 0.72 MG/DL (ref 0.7–1.3)
ERYTHROCYTE [DISTWIDTH] IN BLOOD BY AUTOMATED COUNT: 13.9 % (ref 11.5–14.5)
HCT VFR BLD AUTO: 37.6 % (ref 36.6–50.3)
HGB BLD-MCNC: 12.2 G/DL (ref 12.1–17)
MCH RBC QN AUTO: 29.3 PG (ref 26–34)
MCHC RBC AUTO-ENTMCNC: 32.4 G/DL (ref 30–36.5)
MCV RBC AUTO: 90.4 FL (ref 80–99)
NRBC # BLD: 0 K/UL (ref 0–0.01)
NRBC BLD-RTO: 0 PER 100 WBC
PLATELET # BLD AUTO: 258 K/UL (ref 150–400)
PMV BLD AUTO: 10.2 FL (ref 8.9–12.9)
RBC # BLD AUTO: 4.16 M/UL (ref 4.1–5.7)
WBC # BLD AUTO: 9.4 K/UL (ref 4.1–11.1)

## 2019-05-01 PROCEDURE — 74011250637 HC RX REV CODE- 250/637: Performed by: PODIATRIST

## 2019-05-01 PROCEDURE — 94760 N-INVAS EAR/PLS OXIMETRY 1: CPT

## 2019-05-01 PROCEDURE — 84520 ASSAY OF UREA NITROGEN: CPT

## 2019-05-01 PROCEDURE — 74011250636 HC RX REV CODE- 250/636: Performed by: INTERNAL MEDICINE

## 2019-05-01 PROCEDURE — 74011250637 HC RX REV CODE- 250/637: Performed by: INTERNAL MEDICINE

## 2019-05-01 PROCEDURE — 74011250636 HC RX REV CODE- 250/636: Performed by: PODIATRIST

## 2019-05-01 PROCEDURE — 85027 COMPLETE CBC AUTOMATED: CPT

## 2019-05-01 PROCEDURE — 74011000258 HC RX REV CODE- 258: Performed by: INTERNAL MEDICINE

## 2019-05-01 PROCEDURE — 74011250637 HC RX REV CODE- 250/637: Performed by: NURSE PRACTITIONER

## 2019-05-01 PROCEDURE — 82565 ASSAY OF CREATININE: CPT

## 2019-05-01 PROCEDURE — 36415 COLL VENOUS BLD VENIPUNCTURE: CPT

## 2019-05-01 PROCEDURE — 97530 THERAPEUTIC ACTIVITIES: CPT

## 2019-05-01 PROCEDURE — 65270000029 HC RM PRIVATE

## 2019-05-01 RX ADMIN — LISINOPRIL 40 MG: 20 TABLET ORAL at 08:04

## 2019-05-01 RX ADMIN — SERTRALINE HYDROCHLORIDE 50 MG: 50 TABLET ORAL at 08:04

## 2019-05-01 RX ADMIN — Medication 10 ML: at 21:06

## 2019-05-01 RX ADMIN — CEFTRIAXONE SODIUM 2 G: 2 INJECTION, POWDER, FOR SOLUTION INTRAMUSCULAR; INTRAVENOUS at 15:30

## 2019-05-01 RX ADMIN — ACETAMINOPHEN 650 MG: 325 TABLET ORAL at 23:51

## 2019-05-01 RX ADMIN — SODIUM CHLORIDE 50 MG: 900 INJECTION, SOLUTION INTRAVENOUS at 17:38

## 2019-05-01 RX ADMIN — OXYCODONE HYDROCHLORIDE 15 MG: 5 TABLET ORAL at 03:54

## 2019-05-01 RX ADMIN — PANTOPRAZOLE SODIUM 40 MG: 40 TABLET, DELAYED RELEASE ORAL at 06:18

## 2019-05-01 RX ADMIN — Medication 10 ML: at 06:16

## 2019-05-01 RX ADMIN — THERA TABS 1 TABLET: TAB at 08:05

## 2019-05-01 RX ADMIN — OXYCODONE HYDROCHLORIDE 15 MG: 5 TABLET ORAL at 21:06

## 2019-05-01 RX ADMIN — TAMSULOSIN HYDROCHLORIDE 0.4 MG: 0.4 CAPSULE ORAL at 08:04

## 2019-05-01 RX ADMIN — ACETAMINOPHEN 650 MG: 325 TABLET ORAL at 02:40

## 2019-05-01 RX ADMIN — ACETAMINOPHEN 650 MG: 325 TABLET ORAL at 20:32

## 2019-05-01 RX ADMIN — TRAZODONE HYDROCHLORIDE 50 MG: 50 TABLET ORAL at 21:06

## 2019-05-01 RX ADMIN — METHADONE HYDROCHLORIDE 130 MG: 10 CONCENTRATE ORAL at 08:03

## 2019-05-01 RX ADMIN — ONDANSETRON 4 MG: 2 INJECTION INTRAMUSCULAR; INTRAVENOUS at 19:31

## 2019-05-01 RX ADMIN — ACETAMINOPHEN 650 MG: 325 TABLET ORAL at 07:49

## 2019-05-01 RX ADMIN — OXYCODONE HYDROCHLORIDE 15 MG: 5 TABLET ORAL at 14:46

## 2019-05-01 RX ADMIN — ALLOPURINOL 100 MG: 100 TABLET ORAL at 08:05

## 2019-05-01 RX ADMIN — ENOXAPARIN SODIUM 40 MG: 40 INJECTION SUBCUTANEOUS at 08:04

## 2019-05-01 RX ADMIN — LEVOTHYROXINE SODIUM 88 MCG: 88 TABLET ORAL at 06:19

## 2019-05-01 RX ADMIN — OXYCODONE HYDROCHLORIDE 15 MG: 5 TABLET ORAL at 09:53

## 2019-05-01 RX ADMIN — Medication 100 MG: at 08:05

## 2019-05-01 RX ADMIN — Medication 10 ML: at 15:31

## 2019-05-01 RX ADMIN — Medication 1 CAPSULE: at 08:04

## 2019-05-01 RX ADMIN — SODIUM CHLORIDE 50 MG: 900 INJECTION, SOLUTION INTRAVENOUS at 04:12

## 2019-05-01 RX ADMIN — FOLIC ACID 1 MG: 1 TABLET ORAL at 08:05

## 2019-05-01 RX ADMIN — AMLODIPINE BESYLATE 10 MG: 5 TABLET ORAL at 08:05

## 2019-05-01 NOTE — PROGRESS NOTES
The [address] [facility] Podiatric Surgery  Progress Note  Subjective:  Coral Alva: following for left lower extremity ulcerations with cellulitis. Afebrile. ROS:   Constitutional: Negative for fever, chills, weight loss. Positive for fatigue  HENT: Negative for nosebleeds and congestion. Eyes: Negative for blurred vision and double vision. Respiratory: Negative for cough, shortness of breath and wheezing. Cardiovascular: Negative for chest pain, palpitations, claudication. Positive for leg swelling  Gastrointestinal: Negative for heartburn, nausea, vomiting, abdominal pain. Positive for diarrhea. Genitourinary: Negative for dysuria and urgency. Musculoskeletal: positive for weakness and pain  Skin: positive for wounds  Neurological: Negative for dizziness. Positive for weakness  Endo/Heme/Allergies: Negative for environmental allergies. Does not bruise/bleed easily. 2  Psychiatric/Behavioral: positive for depression    Objective:  Blood pressure 168/77, pulse 64, temperature 97.9 °F (36.6 °C), resp. rate 16, height 6' 1\" (1.854 m), weight 108.9 kg (240 lb), SpO2 94 %. Intake/Output Summary (Last 24 hours) at 5/1/2019 8107  Last data filed at 5/1/2019 0335  Gross per 24 hour   Intake 720 ml   Output --   Net 720 ml         Physical Exam:  General appearance: alert, cooperative, no distress, appears stated age     Lower Extremity Exam:  Vascular:    Dorsalis Pedis Pulse: present  Posterior Tibialis Pulse: present  Popliteal and Femoral Pulses: present  Skin Temp: warm to warm right and left lower extremities legs to toes  Extremity Edema: +1 pitting with chronic hemosiderin deposition  Varicosities: present     Neurological:  Deep Tendon Reflexes of Achilles and Patellar: diminished but intact right.  Diminished left  Epicritic and Protective Sensations: absent     Deep Pain Response: present     Dermatological:  Toenails: mycotic 1-5 right and left foot  Ulceration:  Dressing intact without strikethrough  Ulceration present to dorsal left foot   Measures 2cm x 2.5cm x0.1cm  Granular wound bed noted.   Erythema resolved     Ulceration noted to medial left heel  Measure 4cm x 3.5cm x 0.1cm  Fibrogranular wound bed  Erythema resolved     Rash: absent     Musculoskeletal:  Gait and Station: antalgic and foot drop    Labs:  Lab Results   Component Value Date/Time    WBC 9.4 05/01/2019 02:45 AM    HGB 12.2 05/01/2019 02:45 AM    HCT 37.6 05/01/2019 02:45 AM    MCV 90.4 05/01/2019 02:45 AM    PLATELET 369 70/84/2358 02:45 AM     Lab Results   Component Value Date/Time    Sodium 140 04/29/2019 05:38 AM    Potassium 4.0 04/29/2019 05:38 AM    Chloride 107 04/29/2019 05:38 AM    CO2 28 04/29/2019 05:38 AM    BUN 22 (H) 05/01/2019 02:45 AM    Glucose 95 04/29/2019 05:38 AM     Lab Results   Component Value Date/Time    INR 1.1 12/21/2017 11:28 AM     Current Medications:    Current Facility-Administered Medications:     lisinopril (PRINIVIL, ZESTRIL) tablet 40 mg, 40 mg, Oral, DAILY, Aleatha Kervino, Napbjornan Josefina V, DO, 40 mg at 28/17/61 6873    folic acid (FOLVITE) tablet 1 mg, 1 mg, Oral, DAILY, Aleatha Jumbo, Napolean Josefina V, DO, 1 mg at 05/01/19 0805    thiamine mononitrate (B-1) tablet 100 mg, 100 mg, Oral, DAILY, Aleatha Jumbo, Napolean Josefina V, DO, 100 mg at 05/01/19 0805    therapeutic multivitamin (THERAGRAN) tablet 1 Tab, 1 Tab, Oral, DAILY, Aleatha Jaja Neves, DO, 1 Tab at 05/01/19 0805    oxyCODONE IR (ROXICODONE) tablet 15 mg, 15 mg, Oral, Q6H PRN, Aleatha Jose Nevesy V, DO, 15 mg at 05/01/19 0354    nicotine (NICORETTE) gum 4 mg, 4 mg, Oral, Q2H PRN, Aleatha Lucimbo, Wilkinson [place], DO    ondansetron [facility]) injection 4 mg, 4 mg, IntraVENous, Q6H PRN, Analy Mills, DO, 4 mg at 04/29/19 1809    pantoprazole (PROTONIX) tablet 40 mg, 40 mg, Oral, ACB, Yovanny Mills, DO, 40 mg at 05/01/19 0618    amLODIPine (NORVASC) tablet 10 mg, 10 mg, Oral, DAILY, Eugene Lucas MD, 10 mg at 05/01/19 0805    LORazepam (ATIVAN) injection 4 mg, 4 mg, IntraVENous, Q1H PRN, Shira Burks MD    LORazepam (ATIVAN) injection 2 mg, 2 mg, IntraVENous, Q1H PRN, Shira Burks MD    TIGEcycline (TYGACIL) 50 mg in 0.9% sodium chloride (MBP/ADV) 100 mL, 50 mg, IntraVENous, Q12H, Riley Merck, DO, Last Rate: 100 mL/hr at 05/01/19 0412, 50 mg at 05/01/19 0412    cefTRIAXone (ROCEPHIN) 2 g in 0.9% sodium chloride (MBP/ADV) 50 mL, 2 g, IntraVENous, Q24H, [facility], DO, Last Rate: 100 mL/hr at 04/30/19 1613, 2 g at 04/30/19 1613    sertraline (ZOLOFT) tablet 50 mg, 50 mg, Oral, DAILY, Franchesca Gonzalez NP, 50 mg at 05/01/19 0804    traZODone (DESYREL) tablet 50 mg, 50 mg, Oral, QHS, Franchesca Gonzalez NP, 50 mg at 04/30/19 2208    hydrALAZINE (APRESOLINE) 20 mg/mL injection 10 mg, 10 mg, IntraVENous, Q6H PRN, Pica, Phil Choi, DPM    lactobac ac& pc-s.therm-b.anim (YULIA Q/RISAQUAD), 1 Cap, Oral, DAILY, Pica, Phil Choi, DPM, 1 Cap at 05/01/19 0804    allopurinol (ZYLOPRIM) tablet 100 mg, 100 mg, Oral, DAILY, Pica, Phil Choi, DPM, 100 mg at 05/01/19 0805    levothyroxine (SYNTHROID) tablet 88 mcg, 88 mcg, Oral, ACB, Pica, Phil Sell, DPM, 88 mcg at 05/01/19 7833    melatonin tablet 3 mg, 3 mg, Oral, QHS PRN, Pica, Phil Choi, DPM, 3 mg at 04/25/19 0426    tamsulosin (FLOMAX) capsule 0.4 mg, 0.4 mg, Oral, DAILY, Pica, Phil Sell, DPM, 0.4 mg at 05/01/19 0804    methadone (DOLOPHINE) 10 mg/mL concentrated solution 130 mg, 130 mg, Oral, DAILY, Pica, Phil Sell, DPM, 130 mg at 05/01/19 0803    sodium chloride (NS) flush 5-40 mL, 5-40 mL, IntraVENous, Q8H, Pica Tushar ELIAS, DPM, 10 mL at 05/01/19 0616    sodium chloride (NS) flush 5-40 mL, 5-40 mL, IntraVENous, PRN, Pica, Phil Choi, DPM, 10 mL at 04/30/19 2032    acetaminophen (TYLENOL) tablet 650 mg, 650 mg, Oral, Q4H PRN, Pica, Phil Choi, DPM, 650 mg at 05/01/19 0749    naloxone (NARCAN) injection 0.4 mg, 0.4 mg, IntraVENous, PRN, Pica, Phil Choi, DPM    enoxaparin (LOVENOX) injection 40 mg, 40 mg, SubCUTAneous, Q24H, Pica, Katie Eller DPM, 40 mg at 19 0804    Impression  1. Ulcerations x2 left lower extremity  2. Cellulitis left lower extremity, resolved    Plan:  - wound biopsy still pending. Called pathology. Results should be available this morning. Compression dressing left intact. Antibiotics per ID. Discussed 2 week course post discharge with Dr. Lennox Newborn.    - If negative for pyoderma or neoplasma, then would need vascular eval for venous reflux. Compression dressing is a must with elevation. Following. Katie Eller.  JENAE Kaur FACFAS FACWS [facility]  Triple Board Certified Foot & Ankle Specialist  [phone] cell

## 2019-05-01 NOTE — PROGRESS NOTES
Bedside and Verbal shift change report given to Stella Wood RN (oncoming nurse) by Bayron Rees RN (offgoing nurse). Report included the following information SBAR and Kardex.

## 2019-05-01 NOTE — PROGRESS NOTES
Bedside and Verbal shift change report given to Juliette Gacria RN (oncoming nurse) by Donaldo Khan RN (offgoing nurse). Report included the following information SBAR, Kardex, OR Summary, Procedure Summary, Intake/Output, MAR and Recent Results.

## 2019-05-01 NOTE — PROGRESS NOTES
Problem: Falls - Risk of  Goal: *Absence of Falls  Description  Document Luis Young Fall Risk and appropriate interventions in the flowsheet.   Outcome: Progressing Towards Goal

## 2019-05-01 NOTE — PROGRESS NOTES
[facility] Infectious Disease Special[facility] Progress Note           Sagrario Hager DO    [phone] Office  [phone]  Fax    2019      Assessment & Plan:   1. Chronic left lower extremity wounds. Unable to tolerate MRI. CT negative for OM. S/p I&D by podiatry. No cultures sent. Awaiting biopsy results. Cultures growing SMALT, enterococcus and providencia. Patient allergic to sulfa. Organism is intermediate to ceftaz and cipro. Sensitive to tigecycline. Tigecycline has poor activity against providencia so  added ceftriaxone. Omadacycline (new tetracycline abx) and cefixime may be appropriate for PO deescalation at discharge. Per discussion with Dr Radha Mejia there is very low suspicion for OM based on sed rate, CT, and operative findings, Patient unable to tolerate MRI. CM to check on coverage on omadacycline. IV abx orders in chart  2. Multiple antibiotic allergies include trimethoprim-sulfamethoxazole, ciprofloxacin, and vancomycin. 3.  History of hemorrhagic stroke with left-sided hemiparesis. Subjective:     No new complaints    Objective:     Vitals:   Visit Vitals  /77 (BP 1 Location: Left arm, BP Patient Position: Sitting)   Pulse 64   Temp 97.9 °F (36.6 °C)   Resp 16   Ht 6' 1\" (1.854 m)   Wt 108.9 kg (240 lb)   SpO2 94%   BMI 31.66 kg/m²        Tmax:  Temp (24hrs), Av.1 °F (36.7 °C), Min:97.9 °F (36.6 °C), Max:98.5 °F (36.9 °C)      Exam:   Patient is intubated:  no    Physical Examination:   General:  Alert. NAD   Head:  Normocephalic, atraumatic. Eyes:  Conjunctivae clear   Neck: Supple       Lungs:   No distress   Chest wall:     Heart:     Abdomen:   non-distended   Extremities: Moves all. Skin: LLE dressed   Neurologic: CNII-XII intact. Labs:        No lab exists for component: ITNL   No results for input(s): CPK, CKMB, TROIQ in the last 72 hours.   Recent Labs     19  0245 19  0538   NA  --  140   K  --  4.0   CL  --  107   CO2  --  28   BUN 22* 23*   CREA 0. 72 0.73   GLU  --  95   PHOS  --  4.0   MG  --  2.1   WBC 9.4  --    HGB 12.2  --    HCT 37.6  --      --      No results for input(s): INR, PTP, APTT in the last 72 hours. No lab exists for component: INREXT, INREXT  Needs: urine analysis, urine sodium, protein and creatinine  No results found for: VIVEK, CREAU      Cultures:     Lab Results   Component Value Date/Time    Specimen Description: BLOOD 02/04/2010 10:55 PM    Specimen Description: BLOOD 02/12/2009 11:30 PM     Lab Results   Component Value Date/Time    Culture result: NO GROWTH 6 DAYS 04/23/2019 09:11 PM    Culture result: HEAVY ENTEROCOCCUS FAECALIS GROUP D (A) 04/23/2019 09:11 PM    Culture result: HEAVY PROVIDENCIA RETTGERI (A) 04/23/2019 09:11 PM    Culture result: (A) 04/23/2019 09:11 PM     HEAVY STENOTROPHOMONAS (Saniya Brash.) MALTOPHILIA CLSI GUIDELINES DO NOT RECOMMEND [address]. TRIMETHOPRIM/SULFAMETHOXAZOLE IS THE DRUG OF CHOICE.     Culture result: HEAVY DIPHTHEROIDS (A) 04/23/2019 09:11 PM       Radiology:     Medications       Current Facility-Administered Medications   Medication Dose Route Frequency Last Dose    lisinopril (PRINIVIL, ZESTRIL) tablet 40 mg  40 mg Oral DAILY 40 mg at 54/07/91 1055    folic acid (FOLVITE) tablet 1 mg  1 mg Oral DAILY 1 mg at 05/01/19 0805    thiamine mononitrate (B-1) tablet 100 mg  100 mg Oral DAILY 100 mg at 05/01/19 0805    therapeutic multivitamin (THERAGRAN) tablet 1 Tab  1 Tab Oral DAILY 1 Tab at 05/01/19 0805    oxyCODONE IR (ROXICODONE) tablet 15 mg  15 mg Oral Q6H PRN 15 mg at 05/01/19 0953    nicotine (NICORETTE) gum 4 mg  4 mg Oral Q2H PRN      ondansetron (ZOFRAN) injection 4 mg  4 mg IntraVENous Q6H PRN 4 mg at 04/29/19 1809    pantoprazole (PROTONIX) tablet 40 mg  40 mg Oral ACB 40 mg at 05/01/19 0618    amLODIPine (NORVASC) tablet 10 mg  10 mg Oral DAILY 10 mg at 05/01/19 0805    LORazepam (ATIVAN) injection 4 mg  4 mg IntraVENous Q1H PRN  LORazepam (ATIVAN) injection 2 mg  2 mg IntraVENous Q1H PRN      TIGEcycline (TYGACIL) 50 mg in 0.9% sodium chloride (MBP/ADV) 100 mL  50 mg IntraVENous Q12H 50 mg at 05/01/19 0412    cefTRIAXone (ROCEPHIN) 2 g in 0.9% sodium chloride (MBP/ADV) 50 mL  2 g IntraVENous Q24H 2 g at 04/30/19 1613    sertraline (ZOLOFT) tablet 50 mg  50 mg Oral DAILY 50 mg at 05/01/19 0804    traZODone (DESYREL) tablet 50 mg  50 mg Oral QHS 50 mg at 04/30/19 2208    hydrALAZINE (APRESOLINE) 20 mg/mL injection 10 mg  10 mg IntraVENous Q6H PRN      lactobac ac& pc-s.therm-b.anim (YULIA Q/RISAQUAD)  1 Cap Oral DAILY 1 Cap at 05/01/19 0804    allopurinol (ZYLOPRIM) tablet 100 mg  100 mg Oral DAILY 100 mg at 05/01/19 0805    levothyroxine (SYNTHROID) tablet 88 mcg  88 mcg Oral ACB 88 mcg at 05/01/19 0619    melatonin tablet 3 mg  3 mg Oral QHS PRN 3 mg at 04/25/19 0426    tamsulosin (FLOMAX) capsule 0.4 mg  0.4 mg Oral DAILY 0.4 mg at 05/01/19 0804    methadone (DOLOPHINE) 10 mg/mL concentrated solution 130 mg  130 mg Oral DAILY 130 mg at 05/01/19 0803    sodium chloride (NS) flush 5-40 mL  5-40 mL IntraVENous Q8H 10 mL at 05/01/19 0616    sodium chloride (NS) flush 5-40 mL  5-40 mL IntraVENous PRN 10 mL at 04/30/19 2032    acetaminophen (TYLENOL) tablet 650 mg  650 mg Oral Q4H  mg at 05/01/19 0749    naloxone (NARCAN) injection 0.4 mg  0.4 mg IntraVENous PRN      enoxaparin (LOVENOX) injection 40 mg  40 mg SubCUTAneous Q24H 40 mg at 05/01/19 7693           Case discussed with: SHERRI Olivia DO

## 2019-05-01 NOTE — PROGRESS NOTES
Problem: Mobility Impaired (Adult and Pediatric)  Goal: *Acute Goals and Plan of Care (Insert Text)  Description  Physical Therapy Goals  Initiated 4/30/2019  1. Patient will move from supine to sit and sit to supine  in bed with modified independence within 7 day(s). 2.  Patient will transfer from bed to chair and chair to bed with modified independence using the least restrictive device within 7 day(s). 3.  Patient will perform sit to stand with modified independence within 7 day(s). 4.  Patient will ambulate with modified independence for 150 feet with the least restrictive device within 7 day(s). 5.  Patient will ascend/descend 2 stairs with 1 handrail(s) with modified independence within 7 day(s). Outcome: Progressing Towards Goal   PHYSICAL THERAPY TREATMENT  Patient: Rod Vann (66 y.o. male)  Date: 5/1/2019  Diagnosis: Foot infection [L08.9] Left foot infection  Procedure(s) (LRB):  IRRIGATION AND DEBRIDEMENT LEFT FOOT, BIOPSY LEFT FOOT (POP/SAPH BLOCK) (Left) 6 Days Post-Op  Precautions: Fall  Chart, physical therapy assessment, plan of care and goals were reviewed. ASSESSMENT:  Based on the objective data described below, the patient presents with Supervision level overall for transfers. Gait training completed at Supervision, 10 feet and using a straight cane. Pt limiting further mobility d/t foot pain. Returned to supine and elevated foot on multiple pillows. Reports improved pain with rest. Will decrease frequency to 3x/week as pt is near his baseline as is up ad dionte in room. The following are barriers to independence while in acute care:   -Cognitive and/or behavioral:  volition  -Medical condition: ROM, strength, functional endurance and standing balance    -Other:       Prior level of function: Mod I with SPC. Homeless. PLAN:  Patient continues to benefit from skilled intervention to address the above impairments. Continue treatment per established plan of care.   Recommend with staff: Ab dionte within room  Recommend next PT session: Gait  Discharge recommendations: None vs HHPT is pt agreeable    Patient's barriers to discharging home, in addition to above impairments: lives alone  family availability to assist.    Equipment recommendations for successful discharge (if) home: new SPC. Pt reports it is at his brother in laws house        SUBJECTIVE:   Patient stated ? I need to get back to work. ?    OBJECTIVE DATA SUMMARY:   Critical Behavior:  Neurologic State: Alert  Orientation Level: Oriented X4  Cognition: Follows commands  Safety/Judgement: Awareness of environment, Decreased awareness of need for safety, Insight into deficits  Functional Mobility Training:  Bed Mobility:     Supine to Sit: Independent              Transfers:  Sit to Stand: Supervision  Stand to Sit: Modified independent                             Balance:  Sitting: Intact  Standing: Intact  Ambulation/Gait Training:                                                         Activity Tolerance:   Good  Please refer to the flowsheet for vital signs taken during this treatment.     After treatment patient left:   Supine in bed     COMMUNICATION/COLLABORATION:   The patient?s plan of care was discussed with: Registered Nurse    Steph Simmons, PT   Time Calculation: 10 mins

## 2019-05-01 NOTE — PROGRESS NOTES
Post Discharge PICC and Antibiotic Orders    1. Diagnosis: SSTI foot SMALT  2. Antibiotic:  tigecycline 50mg IV q 12 hours through 5/10/19                         Ceftriaxone 2gm IV q day through 5/10/19  3. Routine PICC/ Luana/ Portacath Care including PRN catheter flow management  4. Weekly labs:   _x__CBC/diff/platelets   _x__BUN/Creatinine   ___CPK   _x__AST/TotalBilirubin/AlkalinePhosphatase   ___CRP   ___Gentamicin level  ___gentamicin peak/trough   Trough Vancomycin level ____goal 10-15 ___goal 15-20  5. Fax lab to [phone]  Call Critical Lab Values to [phone]  6. May send to IR for line evaluation or replacement -[phone]  7. Home Health to pull PIC line at end of therapy or send to IR for Luana removal  8. Allergies: Allergies   Allergen Reactions    Sulfamethoxazole-Trimethoprim Rash     L eye and L hand    Ciprofloxacin Hives     Per pcp records    Codeine Hives     Tolerates dilaudid, oxycodone    Cymbalta [Duloxetine] Other (comments)     Confusion and memory loss    Gabapentin Hives and Diarrhea    Lyrica [Pregabalin] Hives    Sulfa (Sulfonamide Antibiotics) Rash    Ultram [Tramadol] Hives    Vancomycin Shortness of Breath     9. Pharmacy Consult for Vancomycin dosing/gentamicin dosing.       Unknown Bar, DO

## 2019-05-01 NOTE — PROGRESS NOTES
Problem: Pressure Injury - Risk of  Goal: *Prevention of pressure injury  Description  Document Dejuan Scale and appropriate interventions in the flowsheet. Outcome: Progressing Towards Goal     Problem: Patient Education: Go to Patient Education Activity  Goal: Patient/Family Education  Outcome: Progressing Towards Goal     Problem: Falls - Risk of  Goal: *Absence of Falls  Description  Document Marcus Escobedo Fall Risk and appropriate interventions in the flowsheet.   Outcome: Progressing Towards Goal     Problem: Patient Education: Go to Patient Education Activity  Goal: Patient/Family Education  Outcome: Progressing Towards Goal     Problem: Alcohol Withdrawal  Goal: Interventions  Outcome: Progressing Towards Goal     Problem: Patient Education: Go to Patient Education Activity  Goal: Patient/Family Education  Outcome: Progressing Towards Goal     Problem: General Infection Care Plan (Adult and Pediatric)  Goal: Improvement in signs and symptoms of infection  Outcome: Progressing Towards Goal  Goal: *Optimize nutritional status  Outcome: Progressing Towards Goal     Problem: Patient Education: Go to Patient Education Activity  Goal: Patient/Family Education  Outcome: Progressing Towards Goal     Problem: Patient Education: Go to Patient Education Activity  Goal: Patient/Family Education  Outcome: Progressing Towards Goal     Problem: Patient Education: Go to Patient Education Activity  Goal: Patient/Family Education  Outcome: Progressing Towards Goal     Problem: Patient Education: Go to Patient Education Activity  Goal: Patient/Family Education  Outcome: Progressing Towards Goal

## 2019-05-01 NOTE — PROGRESS NOTES
Gigi Marino [facility] [address]  [address] [facility], [facility], [address] [facility]  [phone]      Medical Progress Note      NAME: Lenore Calle   :  1965  MRM:  [id number]    Date/Time: 2019        Assessment / Plan:     Left foot infection/ ulcer: I&D / biopsied . Couldn't tolerate MRI despite sedation/analgesia. CT showed no drainable abscess or e/o osteomyelitis. Wound culture polymicrobial, including enterococcus, Providencia, and Stenotrophomonas. Iv  abx per ID  tigecycline and CTX till 5/10/19  Per ID, omadacycline (new tetracycline abx) and cefixime may be appropriate for PO deescalation at discharge. Awaiting biopsy; podiatry concerned re: possible pyoderma gangrenosum. Per podiatry, if negative for pyoderma or neoplasma, then would need vascular eval for venous reflux which could be completed on an outpatient basis. Disposition and ongoing outpatient wound care may be barriers to discharge    HTN:  Cont lisinopril and Norvasc. IV hydralazine PRN     Chronic pain syndrome: on methadone (confirmed by pharmacy) and oxycodone (increased temporarily due to acute pain but patient informed no pain meds will be given at discharge)    ETOH abuse: per report. Pt denies. No signs of withdrawal on my exam, but has been intermittently agitated but I believe this is behavioral and not EtOH related. Can stop CIWA     Gout: on allopurinol. Continue for now,      Bladder cancer: s/p surgery in 2018 at [facility]. Stable. follow up outpatient      Hypothyroidism:   TSH markedly elevated. FT4 low, T3 okay. Has not been fully comliant Synthroid. Continue Synthroid at current dose for now. repeat TFTs in 4 weeks     Hx of hemorrhagic stroke with left sided hemiparesis: Uses wheelchair and walker at baseline. PT/OT consult. Continue supportive care     Chronic obstructive pulmonary disease (HCC) (). Stable.  PRN nebs        Tobacco abuse:  smoking cessation was given Dispo: Denise Manner is pathology results and final antibiotic plan. Patient is aware no narcotic Rx at discharge. Care Plan discussed with: Patient,Prophylaxis:  Lovenox    Disposition:  TBD         Subjective:     Chief Complaint:  Follow up foot infection    Chart/notes/labs/studies reviewed, patient examined at bedside. Denies fevers /chills/abd pain/n/v/d. Has chr pain          Objective:       Vitals:        Last 24hrs VS reviewed since prior progress note. Most recent are:    Visit Vitals  /77 (BP 1 Location: Left arm, BP Patient Position: Sitting)   Pulse 64   Temp 97.9 °F (36.6 °C)   Resp 16   Ht 6' 1\" (1.854 m)   Wt 108.9 kg (240 lb)   SpO2 94%   BMI 31.66 kg/m²     SpO2 Readings from Last 6 Encounters:   05/01/19 94%   10/12/18 96%   09/28/18 95%   08/03/18 100%   08/01/18 95%   06/08/18 97%    O2 Flow Rate (L/min): 8 l/min       Intake/Output Summary (Last 24 hours) at 5/1/2019 1105  Last data filed at 5/1/2019 1006  Gross per 24 hour   Intake 720 ml   Output 400 ml   Net 320 ml          Exam:     Physical Exam:    Gen:  Unkempt. Chronically ill-appearing. NAD  HEENT:  Sclerae nonicteric, hearing intact to voice, mucous membranes moist    Resp:  No accessory muscle use, CTAB   Card: RRR, without m/r/g. No LE edema. Abd:  soft,  nondistended. Neuro: Face symmetric, follows commands appropriately. L-sided weakness from prior CVA  Psych:  Alert, oriented x 3.    Skin: dressing C/D/I  LE       Medications Reviewed: (see below)    Lab Data Reviewed: (see below)    ______________________________________________________________________    Medications:     Current Facility-Administered Medications   Medication Dose Route Frequency    lisinopril (PRINIVIL, ZESTRIL) tablet 40 mg  40 mg Oral DAILY    folic acid (FOLVITE) tablet 1 mg  1 mg Oral DAILY    thiamine mononitrate (B-1) tablet 100 mg  100 mg Oral DAILY    therapeutic multivitamin (THERAGRAN) tablet 1 Tab  1 Tab Oral DAILY    oxyCODONE IR (ROXICODONE) tablet 15 mg  15 mg Oral Q6H PRN    nicotine (NICORETTE) gum 4 mg  4 mg Oral Q2H PRN    ondansetron (ZOFRAN) injection 4 mg  4 mg IntraVENous Q6H PRN    pantoprazole (PROTONIX) tablet 40 mg  40 mg Oral ACB    amLODIPine (NORVASC) tablet 10 mg  10 mg Oral DAILY    LORazepam (ATIVAN) injection 4 mg  4 mg IntraVENous Q1H PRN    LORazepam (ATIVAN) injection 2 mg  2 mg IntraVENous Q1H PRN    TIGEcycline (TYGACIL) 50 mg in 0.9% sodium chloride (MBP/ADV) 100 mL  50 mg IntraVENous Q12H    cefTRIAXone (ROCEPHIN) 2 g in 0.9% sodium chloride (MBP/ADV) 50 mL  2 g IntraVENous Q24H    sertraline (ZOLOFT) tablet 50 mg  50 mg Oral DAILY    traZODone (DESYREL) tablet 50 mg  50 mg Oral QHS    hydrALAZINE (APRESOLINE) 20 mg/mL injection 10 mg  10 mg IntraVENous Q6H PRN    lactobac ac& pc-s.therm-b.anim (YULIA Q/RISAQUAD)  1 Cap Oral DAILY    allopurinol (ZYLOPRIM) tablet 100 mg  100 mg Oral DAILY    levothyroxine (SYNTHROID) tablet 88 mcg  88 mcg Oral ACB    melatonin tablet 3 mg  3 mg Oral QHS PRN    tamsulosin (FLOMAX) capsule 0.4 mg  0.4 mg Oral DAILY    methadone (DOLOPHINE) 10 mg/mL concentrated solution 130 mg  130 mg Oral DAILY    sodium chloride (NS) flush 5-40 mL  5-40 mL IntraVENous Q8H    sodium chloride (NS) flush 5-40 mL  5-40 mL IntraVENous PRN    acetaminophen (TYLENOL) tablet 650 mg  650 mg Oral Q4H PRN    naloxone (NARCAN) injection 0.4 mg  0.4 mg IntraVENous PRN    enoxaparin (LOVENOX) injection 40 mg  40 mg SubCUTAneous Q24H            Lab Review:     Recent Labs     05/01/19 0245   WBC 9.4   HGB 12.2   HCT 37.6        Recent Labs     05/01/19  0245 04/29/19  0538   NA  --  140   K  --  4.0   CL  --  107   CO2  --  28   GLU  --  95   BUN 22* 23*   CREA 0.72 0.73   CA  --  8.5   MG  --  2.1   PHOS  --  4.0     No components found for: GLPOC  No results for input(s): PH, PCO2, PO2, HCO3, FIO2 in the last 72 hours. No results for input(s): INR in the last 72 hours.     No lab exists for component: Abby Michael  Lab Results   Component Value Date/Time    Specimen Description: BLOOD 02/04/2010 10:55 PM    Specimen Description: BLOOD 02/12/2009 11:30 PM     Lab Results   Component Value Date/Time    Culture result: NO GROWTH 6 DAYS 04/23/2019 09:11 PM    Culture result: HEAVY ENTEROCOCCUS FAECALIS GROUP D (A) 04/23/2019 09:11 PM    Culture result: HEAVY PROVIDENCIA RETTGERI (A) 04/23/2019 09:11 PM    Culture result: (A) 04/23/2019 09:11 PM     HEAVY STENOTROPHOMONAS ([place] Number.) MALTOPHILIA CLSI GUIDELINES DO NOT RECOMMEND REPORTING SUSCEPTIBILITIES FOR S. MALTOPHILIA. TRIMETHOPRIM/SULFAMETHOXAZOLE IS THE DRUG OF CHOICE.     Culture result: HEAVY DIPHTHEROIDS (A) 04/23/2019 09:11 PM              ___________________________________________________    Attending Physician: Grover Orosco MD

## 2019-05-02 PROCEDURE — 74011250637 HC RX REV CODE- 250/637: Performed by: PODIATRIST

## 2019-05-02 PROCEDURE — 74011250636 HC RX REV CODE- 250/636: Performed by: PODIATRIST

## 2019-05-02 PROCEDURE — 74011250637 HC RX REV CODE- 250/637: Performed by: NURSE PRACTITIONER

## 2019-05-02 PROCEDURE — 74011250637 HC RX REV CODE- 250/637: Performed by: INTERNAL MEDICINE

## 2019-05-02 PROCEDURE — 65270000029 HC RM PRIVATE

## 2019-05-02 PROCEDURE — 94760 N-INVAS EAR/PLS OXIMETRY 1: CPT

## 2019-05-02 PROCEDURE — 74011000258 HC RX REV CODE- 258: Performed by: INTERNAL MEDICINE

## 2019-05-02 PROCEDURE — 74011250636 HC RX REV CODE- 250/636: Performed by: INTERNAL MEDICINE

## 2019-05-02 RX ADMIN — CEFTRIAXONE SODIUM 2 G: 2 INJECTION, POWDER, FOR SOLUTION INTRAMUSCULAR; INTRAVENOUS at 15:26

## 2019-05-02 RX ADMIN — SODIUM CHLORIDE 50 MG: 900 INJECTION, SOLUTION INTRAVENOUS at 05:59

## 2019-05-02 RX ADMIN — TAMSULOSIN HYDROCHLORIDE 0.4 MG: 0.4 CAPSULE ORAL at 08:44

## 2019-05-02 RX ADMIN — LISINOPRIL 40 MG: 20 TABLET ORAL at 08:49

## 2019-05-02 RX ADMIN — AMLODIPINE BESYLATE 10 MG: 5 TABLET ORAL at 08:48

## 2019-05-02 RX ADMIN — SERTRALINE HYDROCHLORIDE 50 MG: 50 TABLET ORAL at 08:44

## 2019-05-02 RX ADMIN — Medication 100 MG: at 08:44

## 2019-05-02 RX ADMIN — ALLOPURINOL 100 MG: 100 TABLET ORAL at 08:44

## 2019-05-02 RX ADMIN — TRAZODONE HYDROCHLORIDE 50 MG: 50 TABLET ORAL at 21:30

## 2019-05-02 RX ADMIN — OXYCODONE HYDROCHLORIDE 15 MG: 5 TABLET ORAL at 08:51

## 2019-05-02 RX ADMIN — Medication 10 ML: at 15:27

## 2019-05-02 RX ADMIN — ACETAMINOPHEN 650 MG: 325 TABLET ORAL at 23:45

## 2019-05-02 RX ADMIN — METHADONE HYDROCHLORIDE 130 MG: 10 CONCENTRATE ORAL at 08:42

## 2019-05-02 RX ADMIN — PANTOPRAZOLE SODIUM 40 MG: 40 TABLET, DELAYED RELEASE ORAL at 06:00

## 2019-05-02 RX ADMIN — OXYCODONE HYDROCHLORIDE 15 MG: 5 TABLET ORAL at 15:27

## 2019-05-02 RX ADMIN — LEVOTHYROXINE SODIUM 88 MCG: 88 TABLET ORAL at 06:00

## 2019-05-02 RX ADMIN — FOLIC ACID 1 MG: 1 TABLET ORAL at 08:44

## 2019-05-02 RX ADMIN — Medication 10 ML: at 05:59

## 2019-05-02 RX ADMIN — ENOXAPARIN SODIUM 40 MG: 40 INJECTION SUBCUTANEOUS at 08:44

## 2019-05-02 RX ADMIN — Medication 10 ML: at 21:31

## 2019-05-02 RX ADMIN — THERA TABS 1 TABLET: TAB at 08:44

## 2019-05-02 RX ADMIN — SODIUM CHLORIDE 50 MG: 900 INJECTION, SOLUTION INTRAVENOUS at 16:11

## 2019-05-02 RX ADMIN — OXYCODONE HYDROCHLORIDE 15 MG: 5 TABLET ORAL at 21:30

## 2019-05-02 RX ADMIN — ACETAMINOPHEN 650 MG: 325 TABLET ORAL at 17:00

## 2019-05-02 RX ADMIN — OXYCODONE HYDROCHLORIDE 15 MG: 5 TABLET ORAL at 03:15

## 2019-05-02 RX ADMIN — Medication 1 CAPSULE: at 08:44

## 2019-05-02 RX ADMIN — ACETAMINOPHEN 650 MG: 325 TABLET ORAL at 05:59

## 2019-05-02 RX ADMIN — ONDANSETRON 4 MG: 2 INJECTION INTRAMUSCULAR; INTRAVENOUS at 21:30

## 2019-05-02 NOTE — PROGRESS NOTES
[facility] [address]  [address]  [phone]      Medical Progress Note      NAME: Melody Ludwig   :  1965  MRM:  [id number]    Date/Time: 2019        Assessment / Plan:     Left foot infection/ ulcer: I&D / biopsied . Couldn't tolerate MRI despite sedation/analgesia. CT showed no drainable abscess or e/o osteomyelitis. Wound culture polymicrobial, including enterococcus, Providencia, and Stenotrophomonas. Iv  abx per ID  tigecycline and CTX till 5/10/19  Per ID, omadacycline (new tetracycline abx) and cefixime may be appropriate for PO deescalation at discharge. Biopsy per  podiatry non conclusive,  OP vascular f/u with wound care and DEVEN  Boots recommended by podiatry   Disposition and ongoing outpatient wound care may be barriers to discharge    HTN:  Cont lisinopril and Norvasc. IV hydralazine PRN     Chronic pain syndrome: on methadone (confirmed by pharmacy) and oxycodone (increased temporarily due to acute pain but patient informed no pain meds will be given at discharge)    ETOH abuse: per report. Pt denies. No signs of withdrawal on my exam, but has been intermittently agitated but I believe this is behavioral and not EtOH related. Can stop CIWA     Gout: on allopurinol. Continue for now,      Bladder cancer: s/p surgery in 2018 at [facility]. Stable. follow up outpatient      Hypothyroidism:   TSH markedly elevated. FT4 low, T3 okay. Has not been fully comliant Synthroid. Continue Synthroid at current dose for now. repeat TFTs in 4 weeks     Hx of hemorrhagic stroke with left sided hemiparesis: Uses wheelchair and walker at baseline. PT/OT consult. Continue supportive care     Chronic obstructive pulmonary disease (HCC) (). Stable. PRN nebs      Tobacco abuse:  smoking cessation was given     Dispo: Barrier is  final PO antibiotic plan insurance approval pending.  Patient is aware no narcotic Rx at discharge. Care Plan discussed with: Patient,Prophylaxis:  Lovenox    Disposition:  TBD CM working on case         Subjective:     Chief Complaint:  Follow up foot infection    Chart/notes/labs/studies reviewed, patient examined at bedside. Denies fevers /chills/abd pain/n/v. Has chr pain  One large loose BM today. Objective:       Vitals:        Last 24hrs VS reviewed since prior progress note. Most recent are:    Visit Vitals  /73   Pulse 60   Temp 98.1 °F (36.7 °C)   Resp 18   Ht 6' 1\" (1.854 m)   Wt 108.9 kg (240 lb)   SpO2 97%   BMI 31.66 kg/m²     SpO2 Readings from Last 6 Encounters:   05/02/19 97%   10/12/18 96%   09/28/18 95%   08/03/18 100%   08/01/18 95%   06/08/18 97%    O2 Flow Rate (L/min): 8 l/min       Intake/Output Summary (Last 24 hours) at 5/2/2019 0949  Last data filed at 5/2/2019 0854  Gross per 24 hour   Intake 480 ml   Output 2750 ml   Net -2270 ml          Exam:     Physical Exam:    Gen:  Unkempt. Chronically ill-appearing. NAD  HEENT:  Sclerae nonicteric, hearing intact to voice, mucous membranes moist    Resp:  No accessory muscle use, CTAB   Card: RRR, without m/r/g. No LE edema. Abd:  soft,  nondistended. Neuro: Face symmetric, follows commands appropriately. L-sided weakness from prior CVA  Psych:  Alert, oriented x 3.    Skin: dressing C/D/I  LE       Medications Reviewed: (see below)    Lab Data Reviewed: (see below)    ______________________________________________________________________    Medications:     Current Facility-Administered Medications   Medication Dose Route Frequency    lisinopril (PRINIVIL, ZESTRIL) tablet 40 mg  40 mg Oral DAILY    folic acid (FOLVITE) tablet 1 mg  1 mg Oral DAILY    thiamine mononitrate (B-1) tablet 100 mg  100 mg Oral DAILY    therapeutic multivitamin (THERAGRAN) tablet 1 Tab  1 Tab Oral DAILY    oxyCODONE IR (ROXICODONE) tablet 15 mg  15 mg Oral Q6H PRN    nicotine (NICORETTE) gum 4 mg  4 mg Oral Q2H PRN    ondansetron (ZOFRAN) injection 4 mg  4 mg IntraVENous Q6H PRN    pantoprazole (PROTONIX) tablet 40 mg  40 mg Oral ACB    amLODIPine (NORVASC) tablet 10 mg  10 mg Oral DAILY    LORazepam (ATIVAN) injection 4 mg  4 mg IntraVENous Q1H PRN    LORazepam (ATIVAN) injection 2 mg  2 mg IntraVENous Q1H PRN    TIGEcycline (TYGACIL) 50 mg in 0.9% sodium chloride (MBP/ADV) 100 mL  50 mg IntraVENous Q12H    cefTRIAXone (ROCEPHIN) 2 g in 0.9% sodium chloride (MBP/ADV) 50 mL  2 g IntraVENous Q24H    sertraline (ZOLOFT) tablet 50 mg  50 mg Oral DAILY    traZODone (DESYREL) tablet 50 mg  50 mg Oral QHS    hydrALAZINE (APRESOLINE) 20 mg/mL injection 10 mg  10 mg IntraVENous Q6H PRN    lactobac ac& pc-s.therm-b.anim (YULIA Q/RISAQUAD)  1 Cap Oral DAILY    allopurinol (ZYLOPRIM) tablet 100 mg  100 mg Oral DAILY    levothyroxine (SYNTHROID) tablet 88 mcg  88 mcg Oral ACB    melatonin tablet 3 mg  3 mg Oral QHS PRN    tamsulosin (FLOMAX) capsule 0.4 mg  0.4 mg Oral DAILY    methadone (DOLOPHINE) 10 mg/mL concentrated solution 130 mg  130 mg Oral DAILY    sodium chloride (NS) flush 5-40 mL  5-40 mL IntraVENous Q8H    sodium chloride (NS) flush 5-40 mL  5-40 mL IntraVENous PRN    acetaminophen (TYLENOL) tablet 650 mg  650 mg Oral Q4H PRN    naloxone (NARCAN) injection 0.4 mg  0.4 mg IntraVENous PRN    enoxaparin (LOVENOX) injection 40 mg  40 mg SubCUTAneous Q24H            Lab Review:     Recent Labs     05/01/19  0245   WBC 9.4   HGB 12.2   HCT 37.6        Recent Labs     05/01/19  0245   BUN 22*   CREA 0.72     No components found for: GLPOC  No results for input(s): PH, PCO2, PO2, HCO3, FIO2 in the last 72 hours. No results for input(s): INR in the last 72 hours.     No lab exists for component: Kit Just  Lab Results   Component Value Date/Time    Specimen Description: BLOOD 02/04/2010 10:55 PM    Specimen Description: BLOOD 02/12/2009 11:30 PM     Lab Results   Component Value Date/Time    Culture result: NO GROWTH 6 DAYS 04/23/2019 09:11 PM    Culture result: HEAVY ENTEROCOCCUS FAECALIS GROUP D (A) 04/23/2019 09:11 PM    Culture result: HEAVY PROVIDENCIA RETTGERI (A) 04/23/2019 09:11 PM    Culture result: (A) 04/23/2019 09:11 PM     HEAVY STENOTROPHOMONAS ([place] MALTOPHILIA CLSI GUIDELINES DO NOT RECOMMEND REPORTING SUSCEPTIBILITIES FOR S. MALTOPHILIA. TRIMETHOPRIM/SULFAMETHOXAZOLE IS THE DRUG OF CHOICE.     Culture result: HEAVY DIPHTHEROIDS (A) 04/23/2019 09:11 PM              ___________________________________________________    Attending Physician: Delmi Ashton MD

## 2019-05-02 NOTE — PROGRESS NOTES
Problem: Pressure Injury - Risk of  Goal: *Prevention of pressure injury  Description  Document Dejuan Scale and appropriate interventions in the flowsheet. Outcome: Progressing Towards Goal     Problem: Falls - Risk of  Goal: *Absence of Falls  Description  Document Adela Brew Fall Risk and appropriate interventions in the flowsheet.   Outcome: Progressing Towards Goal

## 2019-05-02 NOTE — PROGRESS NOTES
5/2/2019  11:05 AM  CM awaiting response form [facility] for acceptance during duration of IV abx as pt is unable to administer it himself and has no social supports to assist.

## 2019-05-02 NOTE — PROGRESS NOTES
Problem: Pressure Injury - Risk of  Goal: *Prevention of pressure injury  Description  Document Dejuan Scale and appropriate interventions in the flowsheet. Outcome: Progressing Towards Goal     Problem: Patient Education: Go to Patient Education Activity  Goal: Patient/Family Education  Outcome: Progressing Towards Goal     Problem: Falls - Risk of  Goal: *Absence of Falls  Description  Document Donaldo Carson Fall Risk and appropriate interventions in the flowsheet.   Outcome: Progressing Towards Goal     Problem: Patient Education: Go to Patient Education Activity  Goal: Patient/Family Education  Outcome: Progressing Towards Goal     Problem: Alcohol Withdrawal  Goal: Interventions  Outcome: Progressing Towards Goal     Problem: Patient Education: Go to Patient Education Activity  Goal: Patient/Family Education  Outcome: Progressing Towards Goal     Problem: General Infection Care Plan (Adult and Pediatric)  Goal: Improvement in signs and symptoms of infection  Outcome: Progressing Towards Goal  Goal: *Optimize nutritional status  Outcome: Progressing Towards Goal     Problem: Patient Education: Go to Patient Education Activity  Goal: Patient/Family Education  Outcome: Progressing Towards Goal     Problem: Patient Education: Go to Patient Education Activity  Goal: Patient/Family Education  Outcome: Progressing Towards Goal     Problem: Patient Education: Go to Patient Education Activity  Goal: Patient/Family Education  Outcome: Progressing Towards Goal     Problem: Patient Education: Go to Patient Education Activity  Goal: Patient/Family Education  Outcome: Progressing Towards Goal     Problem: Pain  Goal: *Control of Pain  Outcome: Progressing Towards Goal     Problem: Patient Education: Go to Patient Education Activity  Goal: Patient/Family Education  Outcome: Progressing Towards Goal

## 2019-05-02 NOTE — PROGRESS NOTES
Nutrition Assessment:    RECOMMENDATIONS/INTERVENTION(S):   Continue Regular diet- consider Cardiac if appropriate  Monitor PO intakes, weight, skin integrity  Add Ensure HP once per day, Ten once per day. ASSESSMENT:   5/2: Pt eating well. Intakes 75% this morning for breakfast. Monitor PO intakes for gout. Continue ONS for added protein intakes. Awaiting placement with [facility] for IV antibiotics. Monitor POC. Please take new actual weight. Bedscale is fine. Labs reviewed. No n/v, last BM 4/30. TSH 21.3, 6 months ago it was 3.56. On synthroid now. Document PO intakes, and ONS intakes. Labs reviewed. 4/26: 46  Yr old male admitted for foot infection. PMhx: CVA w hemiparesis, HTN, tobacco, gout. Pt screened for LOS. Pt had code atlas called earlier. Pt was given ativan. Pt unable to give diet hx when stopped to visit. Pt is on Regular diet. Monitor need to switch to Cardiac. Pt POD#1 of foot debridement. Added ONS for wound healing. Pt to have IV antibiotics. Weight has been mostly stable per chart, with slight trend up in weight. Appetite seems good. NO hx of chew/swallow difficulties. No c/d, n/v reported. Labs reviewed. SUBJECTIVE/OBJECTIVE:   Diet Order: Regular  % Eaten:    Patient Vitals for the past 168 hrs:   % Diet Eaten   05/02/19 0854 75 %       Pertinent Medications: [x] Reviewed    Labs reviewed:  [x]     Anthropometrics: Height: 6' 1\" (185.4 cm) Weight: 108.9 kg (240 lb)    IBW (%IBW):   ( ) UBW (%UBW):   (  %)    BMI: Body mass index is 31.66 kg/m². This BMI is indicative of:     [] Underweight    [] Normal    [] Overweight    [x]  Obesity    []  Extreme Obesity (BMI>40)    Estimated Nutrition Needs (Based on): 9050 Kcals/day , 81 g(-109g/day(0.8-1.0g/kg)) Protein  Carbohydrate:  At Least 130 g/day  Fluids: 2600 mL/day (1mL/kg rounded to 50 mL)    Last BM: 4/30   []Active     []Hyperactive  []Hypoactive       [] Absent   BS  Skin:    [] Intact   [x] Incision  [x] Breakdown   [] DTI   [] Tears/Excoriation/Abrasion  []Edema [] Other:    Wt Readings from Last 30 Encounters:   04/25/19 108.9 kg (240 lb)   04/24/19 108.9 kg (240 lb)   09/28/18 103.4 kg (228 lb)   09/28/18 103.4 kg (228 lb)   09/05/18 101.2 kg (223 lb)   08/08/18 101.2 kg (223 lb)   07/31/18 104.3 kg (230 lb)   06/05/18 102.3 kg (225 lb 8.5 oz)   06/04/18 104.3 kg (230 lb)   03/13/18 100.4 kg (221 lb 5.5 oz)   03/14/18 100.2 kg (221 lb)   03/06/18 106.1 kg (234 lb)   12/21/17 94.3 kg (208 lb)   08/13/17 94.3 kg (208 lb)   01/27/17 99.2 kg (218 lb 9.6 oz)   01/06/17 99.3 kg (219 lb)   12/13/16 105.1 kg (231 lb 12.8 oz)   10/14/16 102 kg (224 lb 13.9 oz)   09/10/16 108.4 kg (239 lb)   06/02/16 108.5 kg (239 lb 4 oz)   11/09/15 101.6 kg (224 lb)   02/12/13 109.8 kg (242 lb)   02/07/13 108.9 kg (240 lb)   01/30/13 104.3 kg (230 lb)   12/12/12 104.5 kg (230 lb 6.4 oz)   08/30/12 105.2 kg (232 lb)   04/10/12 107 kg (236 lb)   01/25/12 107 kg (236 lb)   06/14/11 109.8 kg (242 lb)   06/13/11 109.8 kg (242 lb)      NUTRITION DIAGNOSES:   Problem:  Not ready for diet/lifestyle change     Etiology: related to unwilling in learning applying information     Signs/Symptoms: as evidenced by reports of excessive etoh intakes, w/d, agitated      NUTRITION INTERVENTIONS:  Meals/Snacks: General/healthful diet   Supplements: Commercial supplement              GOAL:   Pt will consume >50% of meals w/i 5-7 days    Cultural, Muslim, or Ethnic Dietary Needs: None     LEARNING NEEDS (Diet, Food/Nutrient-Drug Interaction):    [x] None Identified   [] Identified and Education Provided/Documented   [] Identified and Pt declined/was not appropriate      [x] Interdisciplinary Care Plan Reviewed/Documented    [x] Discharge Needs: CCD / no etoh   [] No Nutrition Related Discharge Needs    NUTRITION RISK:   Pt Is At Nutrition Risk  [x]     No Nutrition Risk Identified  []       PT SEEN FOR:    []  MD Consult: []Calorie Count      []Diabetic Diet Education        []Diet Education     []Electrolyte Management     []General Nutrition Management and Supplements     []Management of Tube Feeding     []TPN Recommendations    []  RN Referral:  []MST score >=2     []Enteral/Parenteral Nutrition PTA     []Pregnant: Gestational DM or Multigestation                 [] Pressure Ulcer      []  Low BMI      []  Length of Stay       [] Dysphagia Diet     [] Ventilator      [x] Follow-Up        Previous Recommendations:   [x] Implemented          [] Not Implemented          [] Not Applicable    Previous Goal:   [x] Met              [] Progressing Towards Goal              [] Not Progressing Towards Goal   [] Not Applicable              Bonnie Aponte, [address] [place] [address]  Pager: [phone]  Office: [phone]

## 2019-05-02 NOTE — PROGRESS NOTES
[facility] Infectious Disease Specialists Progress Note           Sravan Corado DO    [phone] Office  [phone]  Fax    2019      Assessment & Plan:   1. Chronic left lower extremity wounds. Unable to tolerate MRI. CT negative for OM. S/p I&D by podiatry. No cultures sent. Biopsy inconclusive. . Cultures growing SMALT, enterococcus and providencia. Patient allergic to sulfa. Organism is intermediate to ceftaz and cipro. Sensitive to tigecycline. Tigecycline has poor activity against providencia so added ceftriaxone. Omadacycline (new tetracycline abx) and cefixime may be appropriate for PO deescalation at discharge but insurance will not cover due to cost. . Per discussion with Dr Lucian Junior there is very low suspicion for OM based on sed rate, CT, and operative findings, Patient unable to tolerate MRI. Anticipate patient will remain in hospital to complete abx  2. Multiple antibiotic allergies include trimethoprim-sulfamethoxazole, ciprofloxacin, and vancomycin. 3.  History of hemorrhagic stroke with left-sided hemiparesis. Subjective:     No new complaints    Objective:     Vitals:   Visit Vitals  /72 (BP 1 Location: Left arm, BP Patient Position: At rest)   Pulse 60   Temp 98.3 °F (36.8 °C)   Resp 18   Ht 6' 1\" (1.854 m)   Wt 108.9 kg (240 lb)   SpO2 95%   BMI 31.66 kg/m²        Tmax:  Temp (24hrs), Av °F (36.7 °C), Min:97.7 °F (36.5 °C), Max:98.3 °F (36.8 °C)      Exam:   Patient is intubated:  no    Physical Examination:   General:  Alert. NAD   Head:  Normocephalic, atraumatic. Eyes:  Conjunctivae clear   Neck: Supple       Lungs:   No distress   Chest wall:     Heart:     Abdomen:   non-distended   Extremities: Moves all. Skin: LLE dressed   Neurologic: CNII-XII intact. Labs:        No lab exists for component: ITNL   No results for input(s): CPK, CKMB, TROIQ in the last 72 hours.   Recent Labs     19  0245   BUN 22*   CREA 0.72   WBC 9.4   HGB 12.2   HCT 37.6   PLT 258     No results for input(s): INR, PTP, APTT in the last 72 hours. No lab exists for component: INREXT, INREXT  Needs: urine analysis, urine sodium, protein and creatinine  No results found for: VIVEK, CREAU      Cultures:     Lab Results   Component Value Date/Time    Specimen Description: BLOOD 02/04/2010 10:55 PM    Specimen Description: BLOOD 02/12/2009 11:30 PM     Lab Results   Component Value Date/Time    Culture result: NO GROWTH 6 DAYS 04/23/2019 09:11 PM    Culture result: HEAVY ENTEROCOCCUS FAECALIS GROUP D (A) 04/23/2019 09:11 PM    Culture result: HEAVY PROVIDENCIA RETTGERI (A) 04/23/2019 09:11 PM    Culture result: (A) 04/23/2019 09:11 PM     HEAVY STENOTROPHOMONAS (Singh Puja.) MALTOPHILIA CLSI GUIDELINES DO NOT RECOMMEND [address] TRIMETHOPRIM/SULFAMETHOXAZOLE IS THE DRUG OF CHOICE.     Culture result: HEAVY DIPHTHEROIDS (A) 04/23/2019 09:11 PM       Radiology:     Medications       Current Facility-Administered Medications   Medication Dose Route Frequency Last Dose    lisinopril (PRINIVIL, ZESTRIL) tablet 40 mg  40 mg Oral DAILY 40 mg at 36/67/75 0888    folic acid (FOLVITE) tablet 1 mg  1 mg Oral DAILY 1 mg at 05/02/19 0844    thiamine mononitrate (B-1) tablet 100 mg  100 mg Oral DAILY 100 mg at 05/02/19 0844    therapeutic multivitamin (THERAGRAN) tablet 1 Tab  1 Tab Oral DAILY 1 Tab at 05/02/19 0844    oxyCODONE IR (ROXICODONE) tablet 15 mg  15 mg Oral Q6H PRN 15 mg at 05/02/19 1527    nicotine (NICORETTE) gum 4 mg  4 mg Oral Q2H PRN      ondansetron (ZOFRAN) injection 4 mg  4 mg IntraVENous Q6H PRN 4 mg at 05/01/19 1931    pantoprazole (PROTONIX) tablet 40 mg  40 mg Oral ACB 40 mg at 05/02/19 0600    amLODIPine (NORVASC) tablet 10 mg  10 mg Oral DAILY 10 mg at 05/02/19 0848    LORazepam (ATIVAN) injection 4 mg  4 mg IntraVENous Q1H PRN      LORazepam (ATIVAN) injection 2 mg  2 mg IntraVENous Q1H PRN      TIGEcycline (TYGACIL) 50 mg in 0.9% sodium chloride (MBP/ADV) 100 mL  50 mg IntraVENous Q12H 50 mg at 05/02/19 1611    cefTRIAXone (ROCEPHIN) 2 g in 0.9% sodium chloride (MBP/ADV) 50 mL  2 g IntraVENous Q24H 2 g at 05/02/19 1526    sertraline (ZOLOFT) tablet 50 mg  50 mg Oral DAILY 50 mg at 05/02/19 0844    traZODone (DESYREL) tablet 50 mg  50 mg Oral QHS 50 mg at 05/01/19 2106    hydrALAZINE (APRESOLINE) 20 mg/mL injection 10 mg  10 mg IntraVENous Q6H PRN      lactobac ac& pc-s.therm-b.anim (YULIA Q/RISAQUAD)  1 Cap Oral DAILY 1 Cap at 05/02/19 0844    allopurinol (ZYLOPRIM) tablet 100 mg  100 mg Oral DAILY 100 mg at 05/02/19 0844    levothyroxine (SYNTHROID) tablet 88 mcg  88 mcg Oral ACB 88 mcg at 05/02/19 0600    melatonin tablet 3 mg  3 mg Oral QHS PRN 3 mg at 04/25/19 0426    tamsulosin (FLOMAX) capsule 0.4 mg  0.4 mg Oral DAILY 0.4 mg at 05/02/19 0844    methadone (DOLOPHINE) 10 mg/mL concentrated solution 130 mg  130 mg Oral DAILY 130 mg at 05/02/19 0842    sodium chloride (NS) flush 5-40 mL  5-40 mL IntraVENous Q8H 10 mL at 05/02/19 1527    sodium chloride (NS) flush 5-40 mL  5-40 mL IntraVENous PRN 10 mL at 04/30/19 2032    acetaminophen (TYLENOL) tablet 650 mg  650 mg Oral Q4H  mg at 05/02/19 1700    naloxone (NARCAN) injection 0.4 mg  0.4 mg IntraVENous PRN      enoxaparin (LOVENOX) injection 40 mg  40 mg SubCUTAneous Q24H 40 mg at 05/02/19 5084           Case discussed with: SHERRI Hunt DO

## 2019-05-02 NOTE — PROGRESS NOTES
Bedside and Verbal shift change report given to Meghan N [address] (oncoming nurse) by Aniyah Bedoya (offgoing nurse). Report included the following information SBAR, Kardex, Procedure Summary, Intake/Output and Recent Results.

## 2019-05-02 NOTE — PROGRESS NOTES
[facility] Podiatric Surgery  Progress Note  Subjective:  Jessica Unger: following for left lower extremity ulcerations with cellulitis. Afebrile. ROS:   Constitutional: Negative for fever, chills, weight loss. Positive for fatigue  HENT: Negative for nosebleeds and congestion. Eyes: Negative for blurred vision and double vision. Respiratory: Negative for cough, shortness of breath and wheezing. Cardiovascular: Negative for chest pain, palpitations, claudication. Positive for leg swelling  Gastrointestinal: Negative for heartburn, nausea, vomiting, abdominal pain. Positive for diarrhea. Genitourinary: Negative for dysuria and urgency. Musculoskeletal: positive for weakness and pain  Skin: positive for wounds  Neurological: Negative for dizziness. Positive for weakness  Endo/Heme/Allergies: Negative for environmental allergies. Does not bruise/bleed easily. 2  Psychiatric/Behavioral: positive for depression    Objective:  Blood pressure 137/68, pulse (!) 52, temperature 97.8 °F (36.6 °C), resp. rate 18, height 6' 1\" (1.854 m), weight 108.9 kg (240 lb), SpO2 97 %. Intake/Output Summary (Last 24 hours) at 5/2/2019 0620  Last data filed at 5/2/2019 0600  Gross per 24 hour   Intake 240 ml   Output 2150 ml   Net -1910 ml         Physical Exam:  General appearance: alert, cooperative, no distress, appears stated age     Lower Extremity Exam:  Vascular:    Dorsalis Pedis Pulse: present  Posterior Tibialis Pulse: present  Popliteal and Femoral Pulses: present  Skin Temp: warm to warm right and left lower extremities legs to toes  Extremity Edema: +1 pitting with chronic hemosiderin deposition  Varicosities: present     Neurological:  Deep Tendon Reflexes of Achilles and Patellar: diminished but intact right.  Diminished left  Epicritic and Protective Sensations: absent     Deep Pain Response: present     Dermatological:  Toenails: mycotic 1-5 right and left foot  Ulceration:  Dressing intact without strikethrough  Ulceration present to dorsal left foot   Measures 2cm x 2.5cm x0.1cm  Granular wound bed noted.   Erythema resolved     Ulceration noted to medial left heel  Measure 4cm x 3.5cm x 0.1cm  Fibrogranular wound bed  Erythema resolved     Rash: absent     Musculoskeletal:  Gait and Station: antalgic and foot drop    Labs:  Lab Results   Component Value Date/Time    WBC 9.4 05/01/2019 02:45 AM    HGB 12.2 05/01/2019 02:45 AM    HCT 37.6 05/01/2019 02:45 AM    MCV 90.4 05/01/2019 02:45 AM    PLATELET 346 71/88/8473 02:45 AM     Lab Results   Component Value Date/Time    Sodium 140 04/29/2019 05:38 AM    Potassium 4.0 04/29/2019 05:38 AM    Chloride 107 04/29/2019 05:38 AM    CO2 28 04/29/2019 05:38 AM    BUN 22 (H) 05/01/2019 02:45 AM    Glucose 95 04/29/2019 05:38 AM     Lab Results   Component Value Date/Time    INR 1.1 12/21/2017 11:28 AM     Current Medications:    Current Facility-Administered Medications:     lisinopril (PRINIVIL, ZESTRIL) tablet 40 mg, 40 mg, Oral, DAILY, Fonnie Thiago, Redell Ranjit V, DO, 40 mg at 45/20/55 6606    folic acid (FOLVITE) tablet 1 mg, 1 mg, Oral, DAILY, Fonnie Thiago, Redell Ranjit V, DO, 1 mg at 05/01/19 0805    thiamine mononitrate (B-1) tablet 100 mg, 100 mg, Oral, DAILY, Fonnie Thiago, Redell Ranjit V, DO, 100 mg at 05/01/19 0805    therapeutic multivitamin (THERAGRAN) tablet 1 Tab, 1 Tab, Oral, DAILY, Fonnie Thiago, Pasty Marus, DO, 1 Tab at 05/01/19 0805    oxyCODONE IR (ROXICODONE) tablet 15 mg, 15 mg, Oral, Q6H PRN, Fonnie Thiago, Yovanny V, DO, 15 mg at 05/02/19 0315    nicotine (NICORETTE) gum 4 mg, 4 mg, Oral, Q2H PRN, Fonnie Thiago Pasty Marus, DO    ondansetron [facility]) injection 4 mg, 4 mg, IntraVENous, Q6H PRN, Eileen Ford DO, 4 mg at 05/01/19 1931    pantoprazole (PROTONIX) tablet 40 mg, 40 mg, Oral, ACB, Yovanny Ford DO, 40 mg at 05/02/19 0600    amLODIPine (NORVASC) tablet 10 mg, 10 mg, Oral, DAILY, Stella Kent MD, 10 mg at 05/01/19 0805    LORazepam (ATIVAN) injection 4 mg, 4 mg, IntraVENous, Q1H PRN, Ray David MD    LORazepam (ATIVAN) injection 2 mg, 2 mg, IntraVENous, Q1H PRN, Ray David MD    TIGEcycline (TYGACIL) 50 mg in 0.9% sodium chloride (MBP/ADV) 100 mL, 50 mg, IntraVENous, Q12H, Rangel Dale, DO, Last Rate: 100 mL/hr at 05/02/19 0559, 50 mg at 05/02/19 0559    cefTRIAXone (ROCEPHIN) 2 g in 0.9% sodium chloride (MBP/ADV) 50 mL, 2 g, IntraVENous, Q24H, Rangel Dale, DO, Last Rate: 100 mL/hr at 05/01/19 1530, 2 g at 05/01/19 1530    sertraline (ZOLOFT) tablet 50 mg, 50 mg, Oral, DAILY, Margaux Coins, NP, 50 mg at 05/01/19 0804    traZODone (DESYREL) tablet 50 mg, 50 mg, Oral, QHS, Margaux Coins, NP, 50 mg at 05/01/19 2106    hydrALAZINE (APRESOLINE) 20 mg/mL injection 10 mg, 10 mg, IntraVENous, Q6H PRN, Pica, Lindbergh Bounds, DPM    lactobac ac& pc-s.therm-b.anim (YULIA Q/RISAQUAD), 1 Cap, Oral, DAILY, Pica, Lindbergh Bounds, DPM, 1 Cap at 05/01/19 0804    allopurinol (ZYLOPRIM) tablet 100 mg, 100 mg, Oral, DAILY, Pica, Lindbergh Bounds, DPM, 100 mg at 05/01/19 0805    levothyroxine (SYNTHROID) tablet 88 mcg, 88 mcg, Oral, ACB, Pica, Lindbergh Bounds, DPM, 88 mcg at 05/02/19 0600    melatonin tablet 3 mg, 3 mg, Oral, QHS PRN, Pica, Lindbergh Bounds, DPM, 3 mg at 04/25/19 0426    tamsulosin (FLOMAX) capsule 0.4 mg, 0.4 mg, Oral, DAILY, Pica, Lindbergh Bounds, DPM, 0.4 mg at 05/01/19 0804    methadone (DOLOPHINE) 10 mg/mL concentrated solution 130 mg, 130 mg, Oral, DAILY, Pica, Papito Bounds, DPM, 130 mg at 05/01/19 0803    sodium chloride (NS) flush 5-40 mL, 5-40 mL, IntraVENous, Q8H, Pica, Tushar N, DPM, 10 mL at 05/02/19 0559    sodium chloride (NS) flush 5-40 mL, 5-40 mL, IntraVENous, PRN, Pica, Papito Bounds, DPM, 10 mL at 04/30/19 2032    acetaminophen (TYLENOL) tablet 650 mg, 650 mg, Oral, Q4H PRN, Pica, Papito Calixto, DPM, 650 mg at 05/02/19 0559    naloxone (NARCAN) injection 0.4 mg, 0.4 mg, IntraVENous, PRN, Pica, Papito Bounds, DPM    enoxaparin (LOVENOX) injection 40 mg, 40 mg, SubCUTAneous, Q24H, Shante Kaur DPM, 40 mg at 05/01/19 5927    Pathology:     FINAL PATHOLOGIC DIAGNOSIS   Skin, left foot, biopsy:   Ulcer and granulation tissue   No evidence of malignancy   See comment   Comment   Microscopic examination of the H&E slides shows epidermal necrosis with overlying crust containing numerous neutrophils. Underlying the ulcer is granulation tissue-like vascular proliferation and mild neutrophilic inflammation. Gram and Giemsa stains are negative for bacteria and parasites. [employer] and Norma stains are negative for mycobacteria. GMS and PAS stains are negative for fungal organisms and a Warthin's starry stain is negative for spirochetes. Negative stains do not entirely exclude an infectious etiology. Otherwise, the findings are not specific but could be seen in pyoderma gangrenosa. However, this is a diagnosis of exclusion and requires clinical and microbiologic correlation. Impression  1. Ulcerations x2 left lower extremity  2. Venous insufficiency. Plan: -. Reviewed pathology. No definitive diagnosis. Venous insufficiency likely given patient compliance issues with previous management. I have recommended a trial of unna boot compression therapy which will be changed weekly in my office. Explained that follow up and treatment consistency is critical for wound healing and limb salvage. Antibiotics per ID.     Geovanna Raina boot compression applied to left lower extremity. Will leave intact for 5-7 days    Following. Shante Fofana.  JENAE Kaur FACFAS FACWS [facility]  Triple Board Certified Foot & Ankle Specialist  [phone] cell

## 2019-05-03 PROCEDURE — 74011250636 HC RX REV CODE- 250/636: Performed by: INTERNAL MEDICINE

## 2019-05-03 PROCEDURE — 94760 N-INVAS EAR/PLS OXIMETRY 1: CPT

## 2019-05-03 PROCEDURE — 74011250637 HC RX REV CODE- 250/637: Performed by: INTERNAL MEDICINE

## 2019-05-03 PROCEDURE — 74011250637 HC RX REV CODE- 250/637: Performed by: PODIATRIST

## 2019-05-03 PROCEDURE — 74011250637 HC RX REV CODE- 250/637: Performed by: NURSE PRACTITIONER

## 2019-05-03 PROCEDURE — 97530 THERAPEUTIC ACTIVITIES: CPT

## 2019-05-03 PROCEDURE — 97116 GAIT TRAINING THERAPY: CPT

## 2019-05-03 PROCEDURE — 74011250637 HC RX REV CODE- 250/637: Performed by: HOSPITALIST

## 2019-05-03 PROCEDURE — 65270000029 HC RM PRIVATE

## 2019-05-03 PROCEDURE — 74011000258 HC RX REV CODE- 258: Performed by: INTERNAL MEDICINE

## 2019-05-03 PROCEDURE — 97535 SELF CARE MNGMENT TRAINING: CPT

## 2019-05-03 PROCEDURE — 74011250636 HC RX REV CODE- 250/636: Performed by: PODIATRIST

## 2019-05-03 RX ORDER — OXYCODONE HYDROCHLORIDE 5 MG/1
20 TABLET ORAL
Status: DISCONTINUED | OUTPATIENT
Start: 2019-05-03 | End: 2019-05-10 | Stop reason: HOSPADM

## 2019-05-03 RX ADMIN — FOLIC ACID 1 MG: 1 TABLET ORAL at 08:19

## 2019-05-03 RX ADMIN — SERTRALINE HYDROCHLORIDE 50 MG: 50 TABLET ORAL at 08:19

## 2019-05-03 RX ADMIN — SODIUM CHLORIDE 50 MG: 900 INJECTION, SOLUTION INTRAVENOUS at 07:33

## 2019-05-03 RX ADMIN — ACETAMINOPHEN 650 MG: 325 TABLET ORAL at 06:14

## 2019-05-03 RX ADMIN — LEVOTHYROXINE SODIUM 88 MCG: 88 TABLET ORAL at 06:15

## 2019-05-03 RX ADMIN — THERA TABS 1 TABLET: TAB at 08:19

## 2019-05-03 RX ADMIN — PANTOPRAZOLE SODIUM 40 MG: 40 TABLET, DELAYED RELEASE ORAL at 06:14

## 2019-05-03 RX ADMIN — OXYCODONE HYDROCHLORIDE 20 MG: 5 TABLET ORAL at 22:24

## 2019-05-03 RX ADMIN — GABAPENTIN 400 MG: 100 CAPSULE ORAL at 18:42

## 2019-05-03 RX ADMIN — OXYCODONE HYDROCHLORIDE 20 MG: 5 TABLET ORAL at 16:22

## 2019-05-03 RX ADMIN — Medication 10 ML: at 07:34

## 2019-05-03 RX ADMIN — CEFTRIAXONE SODIUM 2 G: 2 INJECTION, POWDER, FOR SOLUTION INTRAMUSCULAR; INTRAVENOUS at 17:12

## 2019-05-03 RX ADMIN — TRAZODONE HYDROCHLORIDE 50 MG: 50 TABLET ORAL at 22:24

## 2019-05-03 RX ADMIN — ACETAMINOPHEN 650 MG: 325 TABLET ORAL at 20:29

## 2019-05-03 RX ADMIN — OXYCODONE HYDROCHLORIDE 20 MG: 5 TABLET ORAL at 09:22

## 2019-05-03 RX ADMIN — METHADONE HYDROCHLORIDE 130 MG: 10 CONCENTRATE ORAL at 08:17

## 2019-05-03 RX ADMIN — ALLOPURINOL 100 MG: 100 TABLET ORAL at 08:20

## 2019-05-03 RX ADMIN — SODIUM CHLORIDE 50 MG: 900 INJECTION, SOLUTION INTRAVENOUS at 18:41

## 2019-05-03 RX ADMIN — AMLODIPINE BESYLATE 10 MG: 5 TABLET ORAL at 08:19

## 2019-05-03 RX ADMIN — Medication 1 CAPSULE: at 08:19

## 2019-05-03 RX ADMIN — ENOXAPARIN SODIUM 40 MG: 40 INJECTION SUBCUTANEOUS at 08:20

## 2019-05-03 RX ADMIN — Medication 100 MG: at 08:19

## 2019-05-03 RX ADMIN — TAMSULOSIN HYDROCHLORIDE 0.4 MG: 0.4 CAPSULE ORAL at 08:19

## 2019-05-03 RX ADMIN — OXYCODONE HYDROCHLORIDE 15 MG: 5 TABLET ORAL at 03:38

## 2019-05-03 RX ADMIN — LISINOPRIL 40 MG: 20 TABLET ORAL at 08:19

## 2019-05-03 RX ADMIN — ONDANSETRON 4 MG: 2 INJECTION INTRAMUSCULAR; INTRAVENOUS at 23:49

## 2019-05-03 NOTE — PROGRESS NOTES
Problem: Mobility Impaired (Adult and Pediatric)  Goal: *Acute Goals and Plan of Care (Insert Text)  Description  Physical Therapy Goals  Initiated 4/30/2019  1. Patient will move from supine to sit and sit to supine  in bed with modified independence within 7 day(s). 2.  Patient will transfer from bed to chair and chair to bed with modified independence using the least restrictive device within 7 day(s). 3.  Patient will perform sit to stand with modified independence within 7 day(s). 4.  Patient will ambulate with modified independence for 150 feet with the least restrictive device within 7 day(s). 5.  Patient will ascend/descend 2 stairs with 1 handrail(s) with modified independence within 7 day(s). Outcome: Progressing Towards Goal  Note:   PHYSICAL THERAPY TREATMENT  Patient: Delmar Bob (89 y.o. male)  Date: 5/3/2019  Diagnosis: Foot infection [L08.9] Left foot infection  Procedure(s) (LRB):  IRRIGATION AND DEBRIDEMENT LEFT FOOT, BIOPSY LEFT FOOT (POP/SAPH BLOCK) (Left) 8 Days Post-Op  Precautions: Fall  Chart, physical therapy assessment, plan of care and goals were reviewed. ASSESSMENT:  Pt received in bed agreeable to participate with physical therapy. Pt demonstrated poor truck control with bed mobility to come to sitting EOB with increased postural sway and posterior LOB. Reports increased pain with LLE in dependent position. Close CGA A x2 for functional transfers and gait training x 20' SPC with very wide DANY. Impulsive with transfers, demonstrates poor eccentric control with stand>sitting with posterior LOB. Unsteady gait using SPC may benefit from Abel walker for improved steadying and safety. Will trial next session. Pt returned to bed with LLE elevated. Progression toward goals:  ?    Improving appropriately and progressing toward goals  ? Improving slowly and progressing toward goals  ?     Not making progress toward goals and plan of care will be adjusted     PLAN:  Patient continues to benefit from skilled intervention to address the above impairments. Continue treatment per established plan of care. Discharge Recommendations:  Rehab and Home Health pending progress  Further Equipment Recommendations for Discharge:  maybe noe walker      SUBJECTIVE:   Patient stated ?my leg hurts when I get up.?    OBJECTIVE DATA SUMMARY:   Critical Behavior:  Neurologic State: Alert  Orientation Level: Oriented X4  Cognition: Follows commands  Safety/Judgement: Awareness of environment, Decreased awareness of need for safety, Insight into deficits  Functional Mobility Training:  Bed Mobility:     Supine to Sit: Contact guard assistance(poor trunk control)              Transfers:  Sit to Stand: Contact guard assistance  Stand to Sit: Contact guard assistance(poor eccentric control, posteriot LOB upon sitting)                             Balance:  Sitting: Impaired; Without support  Sitting - Static: Fair (occasional)  Sitting - Dynamic: Fair (occasional)  Standing: Impaired; With support  Standing - Static: Fair  Standing - Dynamic : Fair  Ambulation/Gait Training:  Distance (ft): 20 Feet (ft)  Assistive Device: Cane, straight  Ambulation - Level of Assistance: Contact guard assistance;Assist x2        Gait Abnormalities: Antalgic;Hemiplegic;Trunk sway increased; Step to gait; Path deviations        Base of Support: Center of gravity altered;Shift to right     Speed/Reav: Fluctuations                       Stairs:              Neuro Re-Education:    Therapeutic Exercises:     Pain:  Pain Scale 1: Numeric (0 - 10)  Pain Intensity 1: 5     Pain Orientation 1: Left  Pain Description 1: Throbbing     Activity Tolerance:   fair  Please refer to the flowsheet for vital signs taken during this treatment. After treatment:   ?    Patient left in no apparent distress sitting up in chair  ? Patient left in no apparent distress in bed  ? Call bell left within reach  ? Nursing notified  ?     Caregiver present  ?     Bed alarm activated    COMMUNICATION/COLLABORATION:   The patient?s plan of care was discussed with: Occupational Therapist and Registered Nurse    Suzan Loving   Time Calculation: 23 mins

## 2019-05-03 NOTE — DISCHARGE SUMMARY
Physician Interim Summary   Patient ID:  Silvia Valadez  [id number]  83 y.o.  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 Has polymicrobial infection and multiple allergies PICC line placed but unable to get home iv abx arranged per CM. Has chr pain on methadone and oxyir dose adjusted 5/3/19 monitor. Additional hospital course and discharge summary will be done by discharging physician.

## 2019-05-03 NOTE — PROGRESS NOTES
[facility] Progress Note           Sarah Gonzales DO    [phone] Office  [phone]  Fax    5/3/2019      Assessment & Plan:   1. Chronic left lower extremity wounds. Unable to tolerate MRI. CT negative for OM. S/p I&D by podiatry. No cultures sent. Biopsy inconclusive. . Cultures growing SMALT, enterococcus and providencia. Patient allergic to sulfa. Organism is intermediate to ceftaz and cipro. Sensitive to tigecycline. Tigecycline has poor activity against providencia so added ceftriaxone. Omadacycline (new tetracycline abx) and cefixime may be appropriate for PO deescalation at discharge but insurance will not cover due to cost. . Per discussion with Dr Lisette Stokes there is very low suspicion for OM based on sed rate, CT, and operative findings, Patient unable to tolerate MRI. Anticipate patient will remain in hospital to complete abx  2. Multiple antibiotic allergies include trimethoprim-sulfamethoxazole, ciprofloxacin, and vancomycin. 3.  History of hemorrhagic stroke with left-sided hemiparesis. Subjective:     No new complaints    Objective:     Vitals:   Visit Vitals  /88 (BP 1 Location: Left arm, BP Patient Position: Sitting)   Pulse 60   Temp 97.9 °F (36.6 °C)   Resp 18   Ht 6' 1\" (1.854 m)   Wt 108.9 kg (240 lb)   SpO2 97%   BMI 31.66 kg/m²        Tmax:  Temp (24hrs), Av.1 °F (36.7 °C), Min:97.8 °F (36.6 °C), Max:98.3 °F (36.8 °C)      Exam:   Patient is intubated:  no    Physical Examination:   General:  Alert. NAD   Head:  Normocephalic, atraumatic. Eyes:  Conjunctivae clear   Neck: Supple       Lungs:   No distress   Chest wall:     Heart:     Abdomen:   non-distended   Extremities: Moves all. Skin: LLE dressed   Neurologic: CNII-XII intact. Labs:        No lab exists for component: ITNL   No results for input(s): CPK, CKMB, TROIQ in the last 72 hours.   Recent Labs     19  0245   BUN 22*   CREA 0.72   WBC 9.4   HGB 12.2   HCT 37.6   PLT 258     No results for input(s): INR, PTP, APTT in the last 72 hours. No lab exists for component: INREXT, INREXT  Needs: urine analysis, urine sodium, protein and creatinine  No results found for: VIVEK, CREAU      Cultures:     Lab Results   Component Value Date/Time    Specimen Description: BLOOD 02/04/2010 10:55 PM    Specimen Description: BLOOD 02/12/2009 11:30 PM     Lab Results   Component Value Date/Time    Culture result: NO GROWTH 6 DAYS 04/23/2019 09:11 PM    Culture result: HEAVY ENTEROCOCCUS FAECALIS GROUP D (A) 04/23/2019 09:11 PM    Culture result: HEAVY PROVIDENCIA RETTGERI (A) 04/23/2019 09:11 PM    Culture result: (A) 04/23/2019 09:11 PM     HEAVY STENOTROPHOMONAS (Willow Hoit.) MALTOPHILIA CLSI GUIDELINES DO NOT RECOMMEND [address]. TRIMETHOPRIM/SULFAMETHOXAZOLE IS THE DRUG OF CHOICE.     Culture result: HEAVY DIPHTHEROIDS (A) 04/23/2019 09:11 PM       Radiology:     Medications       Current Facility-Administered Medications   Medication Dose Route Frequency Last Dose    oxyCODONE IR (ROXICODONE) tablet 20 mg  20 mg Oral Q6H PRN 20 mg at 05/03/19 0922    lisinopril (PRINIVIL, ZESTRIL) tablet 40 mg  40 mg Oral DAILY 40 mg at 77/19/61 9176    folic acid (FOLVITE) tablet 1 mg  1 mg Oral DAILY 1 mg at 05/03/19 0819    thiamine mononitrate (B-1) tablet 100 mg  100 mg Oral DAILY 100 mg at 05/03/19 0819    therapeutic multivitamin (THERAGRAN) tablet 1 Tab  1 Tab Oral DAILY 1 Tab at 05/03/19 0819    nicotine (NICORETTE) gum 4 mg  4 mg Oral Q2H PRN      ondansetron (ZOFRAN) injection 4 mg  4 mg IntraVENous Q6H PRN 4 mg at 05/02/19 2130    pantoprazole (PROTONIX) tablet 40 mg  40 mg Oral ACB 40 mg at 05/03/19 0614    amLODIPine (NORVASC) tablet 10 mg  10 mg Oral DAILY 10 mg at 05/03/19 0819    LORazepam (ATIVAN) injection 4 mg  4 mg IntraVENous Q1H PRN      LORazepam (ATIVAN) injection 2 mg  2 mg IntraVENous Q1H PRN      TIGEcycline (TYGACIL) 50 mg in 0.9% sodium chloride (MBP/ADV) 100 mL  50 mg IntraVENous Q12H 50 mg at 05/03/19 0733    cefTRIAXone (ROCEPHIN) 2 g in 0.9% sodium chloride (MBP/ADV) 50 mL  2 g IntraVENous Q24H 2 g at 05/02/19 1526    sertraline (ZOLOFT) tablet 50 mg  50 mg Oral DAILY 50 mg at 05/03/19 0819    traZODone (DESYREL) tablet 50 mg  50 mg Oral QHS 50 mg at 05/02/19 2130    hydrALAZINE (APRESOLINE) 20 mg/mL injection 10 mg  10 mg IntraVENous Q6H PRN      lactobac ac& pc-s.therm-b.anim (YULIA Q/RISAQUAD)  1 Cap Oral DAILY 1 Cap at 05/03/19 0819    allopurinol (ZYLOPRIM) tablet 100 mg  100 mg Oral DAILY 100 mg at 05/03/19 0820    levothyroxine (SYNTHROID) tablet 88 mcg  88 mcg Oral ACB 88 mcg at 05/03/19 0615    melatonin tablet 3 mg  3 mg Oral QHS PRN 3 mg at 04/25/19 0426    tamsulosin (FLOMAX) capsule 0.4 mg  0.4 mg Oral DAILY 0.4 mg at 05/03/19 0819    methadone (DOLOPHINE) 10 mg/mL concentrated solution 130 mg  130 mg Oral DAILY 130 mg at 05/03/19 0817    sodium chloride (NS) flush 5-40 mL  5-40 mL IntraVENous Q8H 10 mL at 05/03/19 0734    sodium chloride (NS) flush 5-40 mL  5-40 mL IntraVENous PRN 10 mL at 04/30/19 2032    acetaminophen (TYLENOL) tablet 650 mg  650 mg Oral Q4H  mg at 05/03/19 0614    naloxone (NARCAN) injection 0.4 mg  0.4 mg IntraVENous PRN      enoxaparin (LOVENOX) injection 40 mg  40 mg SubCUTAneous Q24H 40 mg at 05/03/19 0820           Case discussed with:       Soni Burrows DO

## 2019-05-03 NOTE — PROGRESS NOTES
Problem: Self Care Deficits Care Plan (Adult)  Goal: *Acute Goals and Plan of Care (Insert Text)  Description  Occupational Therapy Goals  Initiated 4/30/2019  1. Patient will perform lower body dressing with modified independence within 7 day(s). 2.  Patient will perform bathing with modified independence within 7 day(s). 3.  Patient will perform grooming, standing at sink, with modified independence within 7 day(s). 4.  Patient will perform toilet transfers with modified independence within 7 day(s). 5.  Patient will perform all aspects of toileting with modified independence within 7 day(s). 6.  Patient will participate in upper extremity therapeutic exercise/activities with supervision/set-up for 10 minutes within 7 day(s). 7.  Patient will utilize energy conservation techniques during functional activities without cues within 7 day(s). Outcome: Progressing Towards Goal     OCCUPATIONAL THERAPY TREATMENT  Patient: Jyothi Mensah (89 y.o. male)  Date: 5/3/2019  Diagnosis: Foot infection [L08.9] Left foot infection  Procedure(s) (LRB):  IRRIGATION AND DEBRIDEMENT LEFT FOOT, BIOPSY LEFT FOOT (POP/SAPH BLOCK) (Left) 8 Days Post-Op  Precautions: Fall  Chart, occupational therapy assessment, plan of care, and goals were reviewed. ASSESSMENT:  Cleared by RN to see pt for therapy session. Pt received supine in bed, agreeable to participate. Transferred supine>sit with CGA, although with poor trunk control and wavering at EOB. Sitting balance improved with time, washed face with setup and doffed/donned R sock with CGA. Using [facility] pt ambulated to bathroom with close CGA, unsteady (particularly with turns), keeping LLE stiff. Pt declined toilet transfer as he was in increased pain, returned to sitting EOB. Pt abruptly rolled to other side of bed and stood to reach items on table, then returned to bed. Call bell in reach and needs met, instructed to call for assistance prior to transferring.  Pt will benefit from continued OT to maximize functional performance, limited at this time by pain, decreased strength and endurance, impaired functional mobility and decreased safety awareness. Progression toward goals:  ?       Improving appropriately and progressing toward goals  ? Improving slowly and progressing toward goals  ? Not making progress toward goals and plan of care will be adjusted     PLAN:  Patient continues to benefit from skilled intervention to address the above impairments. Continue treatment per established plan of care. Discharge Recommendations:  Rehab vs. [address] pending progress  Further Equipment Recommendations for Discharge:  TBD      SUBJECTIVE:   Patient stated ? What's wrong with my balance? .?    OBJECTIVE DATA SUMMARY:   Cognitive/Behavioral Status:  Neurologic State: Alert  Orientation Level: Oriented X4  Cognition: Follows commands  Perception: Appears intact  Perseveration: No perseveration noted  Safety/Judgement: Awareness of environment; Fall prevention;Decreased awareness of need for safety    Functional Mobility and Transfers for ADLs:  Bed Mobility:  Supine to Sit: Contact guard assistance(poor trunk control)    Transfers:  Sit to Stand: Contact guard assistance          Balance:  Sitting: Impaired; Without support  Sitting - Static: Fair (occasional)  Sitting - Dynamic: Fair (occasional)  Standing: Impaired; With support  Standing - Static: Fair  Standing - Dynamic : Fair    ADL Intervention:       Grooming  Washing Face: Supervision/set-up(seated EOB)      Lower Body Dressing Assistance  Socks: Contact guard assistance(to don/doff R only)         Cognitive Retraining  Safety/Judgement: Awareness of environment; Fall prevention;Decreased awareness of need for safety    Pain:  Pain Scale 1: Numeric (0 - 10)  Pain Intensity 1: 5     Pain Orientation 1: Left  Pain Description 1: Throbbing     Activity Tolerance:   Good  Please refer to the flowsheet for vital signs taken during this treatment. After treatment:   ? Patient left in no apparent distress sitting up in chair  ? Patient left in no apparent distress in bed  ? Call bell left within reach  ? Nursing notified  ? Caregiver present  ?  Bed alarm activated    COMMUNICATION/COLLABORATION:   The patient?s plan of care was discussed with: Physical Therapist and Registered Nurse    Bita Ramos OT  Time Calculation: 23 mins

## 2019-05-03 NOTE — PROGRESS NOTES
TRANSFER - OUT REPORT:    Verbal report given to EMERY Fischer(name) on Rod Vann  being transferred to [address] [place] [address] for routine progression of care       Report consisted of patients Situation, Background, Assessment and   Recommendations(SBAR). Information from the following report(s) SBAR, Kardex, Procedure Summary, MAR and Cardiac Rhythm (margaux cardia at times), (baseline) was reviewed with the receiving nurse. Lines:       Opportunity for questions and clarification was provided.       Patient transported with:   Patient's medications from home  Patient-specific medications from Pharmacy

## 2019-05-03 NOTE — PROGRESS NOTES
[facility]  [address] [facility], [address] [place] [address]  [phone]      Medical Progress Note      NAME: Melody Ludwig   :  1965  MRM:  [id number]    Date/Time: 5/3/2019        Assessment / Plan:     Left foot infection/ ulcer: I&D / biopsied .   -Couldn't tolerate MRI despite sedation/analgesia. -CT showed no drainable abscess or e/o osteomyelitis.  -Wound culture polymicrobial, including enterococcus, Providencia, and Stenotrophomonas.  -Iv  abx per ID  tigecycline and CTX till 5/10/19  -Per ID, omadacycline (new tetracycline abx) and cefixime may be appropriate for PO deescalation at discharge but insurance will not cover it.   -Biopsy per  podiatry non conclusive,  OP vascular f/u with wound care and DEVEN  Boots recommended by podiatry     HTN:  Cont lisinopril and Norvasc. IV hydralazine PRN     Chronic pain syndrome: on methadone (confirmed by pharmacy) and oxycodone (increased temporarily due to acute pain but patient informed no pain meds will be given at discharge)    ETOH abuse: per report. Pt denies. No signs of withdrawal        Gout: on allopurinol. Continue for now,      Bladder cancer: s/p surgery in 2018 at [facility]. Stable. follow up outpatient      Hypothyroidism:   TSH markedly elevated. FT4 low, T3 okay. Has not been fully comliant Synthroid. Continue Synthroid at current dose for now. repeat TFTs in 4 weeks     Hx of hemorrhagic stroke with left sided hemiparesis: Uses wheelchair and walker at baseline. PT/OT consult. Continue supportive care     Chronic obstructive pulmonary disease (HCC) (). Stable. PRN nebs      Tobacco abuse:  smoking cessation was given     Dispo: Barrier is  final PO antibiotic plan insurance approval pending. Patient is aware no narcotic Rx at discharge. Cont iv abx till 5/10/19 per ID.     Care Plan discussed with: Patient,    Prophylaxis:  Lovenox         Subjective:     Chief Complaint:  Follow up foot infection    Chart/notes/labs/studies reviewed, patient examined at bedside. Asking for increasing his pain meds frequency to Q4hrs. Already on methadone. Objective:       Vitals:        Last 24hrs VS reviewed since prior progress note. Most recent are:    Visit Vitals  /76 (BP 1 Location: Left arm, BP Patient Position: At rest;Head of bed elevated (Comment degrees))   Pulse 61   Temp 98.2 °F (36.8 °C)   Resp 18   Ht 6' 1\" (1.854 m)   Wt 108.9 kg (240 lb)   SpO2 94%   BMI 31.66 kg/m²     SpO2 Readings from Last 6 Encounters:   05/03/19 94%   10/12/18 96%   09/28/18 95%   08/03/18 100%   08/01/18 95%   06/08/18 97%    O2 Flow Rate (L/min): 8 l/min       Intake/Output Summary (Last 24 hours) at 5/3/2019 1006  Last data filed at 5/3/2019 0616  Gross per 24 hour   Intake 480 ml   Output 2450 ml   Net -1970 ml          Exam:     Physical Exam:    Gen:  Unkempt. Chronically ill-appearing. NAD  HEENT:  Sclerae nonicteric, hearing intact to voice, mucous membranes moist    Resp:  No accessory muscle use, CTAB   Card: RRR, without m/r/g. No LE edema. Abd:  soft,  nondistended. Neuro: Face symmetric, follows commands appropriately. L-sided weakness from prior CVA  Psych:  Alert, oriented x 3.    Skin: dressing C/D/I  LE       Medications Reviewed: (see below)    Lab Data Reviewed: (see below)    ______________________________________________________________________    Medications:     Current Facility-Administered Medications   Medication Dose Route Frequency    oxyCODONE IR (ROXICODONE) tablet 20 mg  20 mg Oral Q6H PRN    lisinopril (PRINIVIL, ZESTRIL) tablet 40 mg  40 mg Oral DAILY    folic acid (FOLVITE) tablet 1 mg  1 mg Oral DAILY    thiamine mononitrate (B-1) tablet 100 mg  100 mg Oral DAILY    therapeutic multivitamin (THERAGRAN) tablet 1 Tab  1 Tab Oral DAILY    nicotine (NICORETTE) gum 4 mg  4 mg Oral Q2H PRN    ondansetron (ZOFRAN) injection 4 mg  4 mg IntraVENous Q6H PRN    pantoprazole (PROTONIX) tablet 40 mg  40 mg Oral ACB    amLODIPine (NORVASC) tablet 10 mg  10 mg Oral DAILY    LORazepam (ATIVAN) injection 4 mg  4 mg IntraVENous Q1H PRN    LORazepam (ATIVAN) injection 2 mg  2 mg IntraVENous Q1H PRN    TIGEcycline (TYGACIL) 50 mg in 0.9% sodium chloride (MBP/ADV) 100 mL  50 mg IntraVENous Q12H    cefTRIAXone (ROCEPHIN) 2 g in 0.9% sodium chloride (MBP/ADV) 50 mL  2 g IntraVENous Q24H    sertraline (ZOLOFT) tablet 50 mg  50 mg Oral DAILY    traZODone (DESYREL) tablet 50 mg  50 mg Oral QHS    hydrALAZINE (APRESOLINE) 20 mg/mL injection 10 mg  10 mg IntraVENous Q6H PRN    lactobac ac& pc-s.therm-b.anim (YULIA Q/RISAQUAD)  1 Cap Oral DAILY    allopurinol (ZYLOPRIM) tablet 100 mg  100 mg Oral DAILY    levothyroxine (SYNTHROID) tablet 88 mcg  88 mcg Oral ACB    melatonin tablet 3 mg  3 mg Oral QHS PRN    tamsulosin (FLOMAX) capsule 0.4 mg  0.4 mg Oral DAILY    methadone (DOLOPHINE) 10 mg/mL concentrated solution 130 mg  130 mg Oral DAILY    sodium chloride (NS) flush 5-40 mL  5-40 mL IntraVENous Q8H    sodium chloride (NS) flush 5-40 mL  5-40 mL IntraVENous PRN    acetaminophen (TYLENOL) tablet 650 mg  650 mg Oral Q4H PRN    naloxone (NARCAN) injection 0.4 mg  0.4 mg IntraVENous PRN    enoxaparin (LOVENOX) injection 40 mg  40 mg SubCUTAneous Q24H            Lab Review:     Recent Labs     05/01/19  0245   WBC 9.4   HGB 12.2   HCT 37.6        Recent Labs     05/01/19  0245   BUN 22*   CREA 0.72     No components found for: GLPOC  No results for input(s): PH, PCO2, PO2, HCO3, FIO2 in the last 72 hours. No results for input(s): INR in the last 72 hours.     No lab exists for component: Taylor Wilson  Lab Results   Component Value Date/Time    Specimen Description: BLOOD 02/04/2010 10:55 PM    Specimen Description: BLOOD 02/12/2009 11:30 PM     Lab Results   Component Value Date/Time    Culture result: NO GROWTH 6 DAYS 04/23/2019 09:11 PM    Culture result: HEAVY ENTEROCOCCUS FAECALIS GROUP D (A) 04/23/2019 09:11 PM    Culture result: HEAVY PROVIDENCIA RETTGERI (A) 04/23/2019 09:11 PM    Culture result: (A) 04/23/2019 09:11 PM     HEAVY STENOTROPHOMONAS (Doretha Fernández) MALTOPHILIA CLSI GUIDELINES DO NOT RECOMMEND REPORTING SUSCEPTIBILITIES FOR S. MALTOPHILIA. TRIMETHOPRIM/SULFAMETHOXAZOLE IS THE DRUG OF CHOICE.     Culture result: HEAVY DIPHTHEROIDS (A) 04/23/2019 09:11 PM              ___________________________________________________    Attending Physician: Slime Xiong MD

## 2019-05-03 NOTE — PROGRESS NOTES
Bedside and Verbal shift change report given to King Babb RN (oncoming nurse) by Vandana Mandel RN (offgoing nurse). Report included the following information SBAR, Kardex, MAR, Accordion and Recent Results.

## 2019-05-04 LAB
ANION GAP SERPL CALC-SCNC: 4 MMOL/L (ref 5–15)
BASOPHILS # BLD: 0.1 K/UL (ref 0–0.1)
BASOPHILS NFR BLD: 1 % (ref 0–1)
BUN SERPL-MCNC: 25 MG/DL (ref 6–20)
BUN/CREAT SERPL: 33 (ref 12–20)
CALCIUM SERPL-MCNC: 8.6 MG/DL (ref 8.5–10.1)
CHLORIDE SERPL-SCNC: 104 MMOL/L (ref 97–108)
CO2 SERPL-SCNC: 30 MMOL/L (ref 21–32)
CREAT SERPL-MCNC: 0.76 MG/DL (ref 0.7–1.3)
DIFFERENTIAL METHOD BLD: ABNORMAL
EOSINOPHIL # BLD: 0.6 K/UL (ref 0–0.4)
EOSINOPHIL NFR BLD: 5 % (ref 0–7)
ERYTHROCYTE [DISTWIDTH] IN BLOOD BY AUTOMATED COUNT: 13.9 % (ref 11.5–14.5)
GLUCOSE SERPL-MCNC: 106 MG/DL (ref 65–100)
HCT VFR BLD AUTO: 40.3 % (ref 36.6–50.3)
HGB BLD-MCNC: 13.1 G/DL (ref 12.1–17)
IMM GRANULOCYTES # BLD AUTO: 0 K/UL (ref 0–0.04)
IMM GRANULOCYTES NFR BLD AUTO: 0 % (ref 0–0.5)
LYMPHOCYTES # BLD: 3.7 K/UL (ref 0.8–3.5)
LYMPHOCYTES NFR BLD: 33 % (ref 12–49)
MCH RBC QN AUTO: 29.4 PG (ref 26–34)
MCHC RBC AUTO-ENTMCNC: 32.5 G/DL (ref 30–36.5)
MCV RBC AUTO: 90.6 FL (ref 80–99)
MONOCYTES # BLD: 1.4 K/UL (ref 0–1)
MONOCYTES NFR BLD: 12 % (ref 5–13)
NEUTS SEG # BLD: 5.7 K/UL (ref 1.8–8)
NEUTS SEG NFR BLD: 49 % (ref 32–75)
NRBC # BLD: 0 K/UL (ref 0–0.01)
NRBC BLD-RTO: 0 PER 100 WBC
PLATELET # BLD AUTO: 298 K/UL (ref 150–400)
PMV BLD AUTO: 10.2 FL (ref 8.9–12.9)
POTASSIUM SERPL-SCNC: 4.4 MMOL/L (ref 3.5–5.1)
RBC # BLD AUTO: 4.45 M/UL (ref 4.1–5.7)
SODIUM SERPL-SCNC: 138 MMOL/L (ref 136–145)
WBC # BLD AUTO: 11.5 K/UL (ref 4.1–11.1)

## 2019-05-04 PROCEDURE — 74011250637 HC RX REV CODE- 250/637: Performed by: PODIATRIST

## 2019-05-04 PROCEDURE — 74011250636 HC RX REV CODE- 250/636: Performed by: PODIATRIST

## 2019-05-04 PROCEDURE — 74011250637 HC RX REV CODE- 250/637: Performed by: INTERNAL MEDICINE

## 2019-05-04 PROCEDURE — 36415 COLL VENOUS BLD VENIPUNCTURE: CPT

## 2019-05-04 PROCEDURE — 94760 N-INVAS EAR/PLS OXIMETRY 1: CPT

## 2019-05-04 PROCEDURE — 74011000250 HC RX REV CODE- 250: Performed by: INTERNAL MEDICINE

## 2019-05-04 PROCEDURE — 80048 BASIC METABOLIC PNL TOTAL CA: CPT

## 2019-05-04 PROCEDURE — 65270000029 HC RM PRIVATE

## 2019-05-04 PROCEDURE — 74011000258 HC RX REV CODE- 258: Performed by: INTERNAL MEDICINE

## 2019-05-04 PROCEDURE — 74011250637 HC RX REV CODE- 250/637: Performed by: NURSE PRACTITIONER

## 2019-05-04 PROCEDURE — 85025 COMPLETE CBC W/AUTO DIFF WBC: CPT

## 2019-05-04 PROCEDURE — 74011250636 HC RX REV CODE- 250/636: Performed by: INTERNAL MEDICINE

## 2019-05-04 PROCEDURE — 74011250637 HC RX REV CODE- 250/637: Performed by: HOSPITALIST

## 2019-05-04 RX ORDER — IBUPROFEN 200 MG
200 TABLET ORAL 3 TIMES DAILY
Status: DISCONTINUED | OUTPATIENT
Start: 2019-05-04 | End: 2019-05-10 | Stop reason: HOSPADM

## 2019-05-04 RX ADMIN — ENOXAPARIN SODIUM 40 MG: 40 INJECTION SUBCUTANEOUS at 08:23

## 2019-05-04 RX ADMIN — GABAPENTIN 400 MG: 100 CAPSULE ORAL at 08:18

## 2019-05-04 RX ADMIN — IBUPROFEN 200 MG: 200 TABLET, FILM COATED ORAL at 15:16

## 2019-05-04 RX ADMIN — SERTRALINE HYDROCHLORIDE 50 MG: 50 TABLET ORAL at 08:16

## 2019-05-04 RX ADMIN — TRAZODONE HYDROCHLORIDE 50 MG: 50 TABLET ORAL at 21:20

## 2019-05-04 RX ADMIN — Medication 100 MG: at 08:17

## 2019-05-04 RX ADMIN — IBUPROFEN 200 MG: 200 TABLET, FILM COATED ORAL at 21:20

## 2019-05-04 RX ADMIN — Medication 1 CAPSULE: at 08:17

## 2019-05-04 RX ADMIN — LEVOTHYROXINE SODIUM 88 MCG: 88 TABLET ORAL at 04:30

## 2019-05-04 RX ADMIN — SODIUM CHLORIDE 50 MG: 900 INJECTION, SOLUTION INTRAVENOUS at 04:36

## 2019-05-04 RX ADMIN — ALLOPURINOL 100 MG: 100 TABLET ORAL at 08:18

## 2019-05-04 RX ADMIN — TAMSULOSIN HYDROCHLORIDE 0.4 MG: 0.4 CAPSULE ORAL at 08:17

## 2019-05-04 RX ADMIN — THERA TABS 1 TABLET: TAB at 08:17

## 2019-05-04 RX ADMIN — ONDANSETRON 4 MG: 2 INJECTION INTRAMUSCULAR; INTRAVENOUS at 15:16

## 2019-05-04 RX ADMIN — Medication 10 ML: at 15:16

## 2019-05-04 RX ADMIN — AMLODIPINE BESYLATE 10 MG: 5 TABLET ORAL at 08:17

## 2019-05-04 RX ADMIN — METHADONE HYDROCHLORIDE 130 MG: 10 CONCENTRATE ORAL at 08:17

## 2019-05-04 RX ADMIN — LISINOPRIL 40 MG: 20 TABLET ORAL at 08:18

## 2019-05-04 RX ADMIN — GABAPENTIN 400 MG: 100 CAPSULE ORAL at 17:08

## 2019-05-04 RX ADMIN — SODIUM CHLORIDE 50 MG: 900 INJECTION, SOLUTION INTRAVENOUS at 17:09

## 2019-05-04 RX ADMIN — OXYCODONE HYDROCHLORIDE 20 MG: 5 TABLET ORAL at 23:03

## 2019-05-04 RX ADMIN — OXYCODONE HYDROCHLORIDE 20 MG: 5 TABLET ORAL at 17:08

## 2019-05-04 RX ADMIN — PANTOPRAZOLE SODIUM 40 MG: 40 TABLET, DELAYED RELEASE ORAL at 04:30

## 2019-05-04 RX ADMIN — Medication 10 ML: at 21:20

## 2019-05-04 RX ADMIN — FOLIC ACID 1 MG: 1 TABLET ORAL at 08:17

## 2019-05-04 RX ADMIN — ALTEPLASE 1 MG: 2.2 INJECTION, POWDER, LYOPHILIZED, FOR SOLUTION INTRAVENOUS at 06:33

## 2019-05-04 RX ADMIN — OXYCODONE HYDROCHLORIDE 20 MG: 5 TABLET ORAL at 04:30

## 2019-05-04 RX ADMIN — OXYCODONE HYDROCHLORIDE 20 MG: 5 TABLET ORAL at 10:41

## 2019-05-04 RX ADMIN — CEFTRIAXONE SODIUM 2 G: 2 INJECTION, POWDER, FOR SOLUTION INTRAMUSCULAR; INTRAVENOUS at 15:16

## 2019-05-04 RX ADMIN — ACETAMINOPHEN 650 MG: 325 TABLET ORAL at 01:03

## 2019-05-04 RX ADMIN — Medication 10 ML: at 04:30

## 2019-05-04 NOTE — PROGRESS NOTES
Bedside and Verbal shift change report given to [address] RN (oncoming nurse) by Bacilio Birch RN (offgoing nurse). Report included the following information SBAR, Kardex, Procedure Summary, Intake/Output, MAR, Accordion and Recent Results.

## 2019-05-04 NOTE — PROGRESS NOTES
[facility]    Medical Progress Note      NAME: Jyothi Mensah   :  1965  MRM:  [id number]    Date/Time: 2019  10:24 AM          Assessment and Plan:     Left foot infection - POA, I&D of ulcer with Bx by podiatry on . CT showed no drainable abscess or e/o osteomyelitis. Wound culture polymicrobial, including enterococcus, Providencia, and Stenotrophomonas. ID consulted and are giving IV tigecycline and CTX till 5/10/19. Lack of insurance coverage to do this outside hospital. OP vascular f/u with wound care and DEVEN  Boots recommended by podiatry      HTN - Stable on lisinopril and Norvasc. IV hydralazine PRN     Chronic pain syndrome / Depression - Continue methadone (confirmed by pharmacy) and now gabapentin and oxycodone (increased temporarily due to acute pain but patient informed no pain meds will be given at discharge). Appreciate psychiatry consult. We have started sertraline and prn QHS trazodone.     ETOH abuse - per report. Pt denies. No signs of withdrawal. Goody vitamins      Gout - No symptoms on allopurinol.     Malignant neoplasm of urinary bladder - s/p surgery in 2018 at [facility]. Cr stable. Has missed his outpatient followup and he requests Urology to see him here. Continue flomax     Hypothyroidism - TSH markedly elevated presumed due to non compliance. Continue Synthroid at current dose for now, but repeat TFTs in 4 weeks     Hx of hemorrhagic stroke with left sided hemiparesis / Physical debility - Uses wheelchair and walker at baseline. PT/OT consulted. Continue supportive care     Chronic obstructive pulmonary disease - Stable. PRN nebs    Tobacco abuse - nicoderm         Subjective:     Chief Complaint:  Pain stable. Asking about bladder cancer follow up. ROS:  (bold if positive, if negative)    Tolerating some PT  Tolerating Diet        Objective:     Last 24hrs VS reviewed since prior progress note.  Most recent are:    Visit Vitals  /82 (BP 1 Location: Left arm, BP Patient Position: At rest)   Pulse (!) 52   Temp 97.6 °F (36.4 °C)   Resp 17   Ht 6' 1\" (1.854 m)   Wt 108.9 kg (240 lb)   SpO2 97%   BMI 31.66 kg/m²     SpO2 Readings from Last 6 Encounters:   05/04/19 97%   10/12/18 96%   09/28/18 95%   08/03/18 100%   08/01/18 95%   06/08/18 97%    O2 Flow Rate (L/min): 8 l/min       Intake/Output Summary (Last 24 hours) at 5/4/2019 1024  Last data filed at 5/4/2019 1012  Gross per 24 hour   Intake 360 ml   Output 1150 ml   Net -790 ml        Physical Exam:    Gen:  Obese, in no acute distress  HEENT:  Pink conjunctivae, PERRL, hearing intact to voice, moist mucous membranes  Neck:  Supple, without masses, thyroid non-tender  Resp:  No accessory muscle use, clear breath sounds without wheezes rales or rhonchi  Card:  No murmurs, normal S1, S2 without thrills, bruits or peripheral edema  Abd:  Soft, non-tender, non-distended, normoactive bowel sounds are present, no mass  Lymph:  No cervical or inguinal adenopathy  Musc:  No cyanosis or clubbing  Skin:  LLE in dressing CDI, skin turgor is good  Neuro:  Cranial nerves are grossly intact, general motor weakness, follows commands appropriately  Psych:  Poor insight, oriented to person, place and time, alert    Telemetry reviewed:   normal sinus rhythm  __________________________________________________________________  Medications Reviewed: (see below)  Medications:     Current Facility-Administered Medications   Medication Dose Route Frequency    oxyCODONE IR (ROXICODONE) tablet 20 mg  20 mg Oral Q6H PRN    gabapentin (NEURONTIN) capsule 400 mg  400 mg Oral BID    lisinopril (PRINIVIL, ZESTRIL) tablet 40 mg  40 mg Oral DAILY    folic acid (FOLVITE) tablet 1 mg  1 mg Oral DAILY    thiamine mononitrate (B-1) tablet 100 mg  100 mg Oral DAILY    therapeutic multivitamin (THERAGRAN) tablet 1 Tab  1 Tab Oral DAILY    nicotine (NICORETTE) gum 4 mg  4 mg Oral Q2H PRN    ondansetron (ZOFRAN) injection 4 mg  4 mg IntraVENous Q6H PRN    pantoprazole (PROTONIX) tablet 40 mg  40 mg Oral ACB    amLODIPine (NORVASC) tablet 10 mg  10 mg Oral DAILY    LORazepam (ATIVAN) injection 4 mg  4 mg IntraVENous Q1H PRN    LORazepam (ATIVAN) injection 2 mg  2 mg IntraVENous Q1H PRN    TIGEcycline (TYGACIL) 50 mg in 0.9% sodium chloride (MBP/ADV) 100 mL  50 mg IntraVENous Q12H    cefTRIAXone (ROCEPHIN) 2 g in 0.9% sodium chloride (MBP/ADV) 50 mL  2 g IntraVENous Q24H    sertraline (ZOLOFT) tablet 50 mg  50 mg Oral DAILY    traZODone (DESYREL) tablet 50 mg  50 mg Oral QHS    hydrALAZINE (APRESOLINE) 20 mg/mL injection 10 mg  10 mg IntraVENous Q6H PRN    lactobac ac& pc-s.therm-b.anim (YULIA Q/RISAQUAD)  1 Cap Oral DAILY    allopurinol (ZYLOPRIM) tablet 100 mg  100 mg Oral DAILY    levothyroxine (SYNTHROID) tablet 88 mcg  88 mcg Oral ACB    melatonin tablet 3 mg  3 mg Oral QHS PRN    tamsulosin (FLOMAX) capsule 0.4 mg  0.4 mg Oral DAILY    methadone (DOLOPHINE) 10 mg/mL concentrated solution 130 mg  130 mg Oral DAILY    sodium chloride (NS) flush 5-40 mL  5-40 mL IntraVENous Q8H    sodium chloride (NS) flush 5-40 mL  5-40 mL IntraVENous PRN    acetaminophen (TYLENOL) tablet 650 mg  650 mg Oral Q4H PRN    naloxone (NARCAN) injection 0.4 mg  0.4 mg IntraVENous PRN    enoxaparin (LOVENOX) injection 40 mg  40 mg SubCUTAneous Q24H        Lab Data Reviewed: (see below)  Lab Review:     Recent Labs     05/04/19  0830   WBC 11.5*   HGB 13.1   HCT 40.3        Recent Labs     05/04/19  0830      K 4.4      CO2 30   *   BUN 25*   CREA 0.76   CA 8.6     Lab Results   Component Value Date/Time    Glucose (POC) 150 (H) 03/19/2018 11:45 AM    Glucose (POC) 123 (H) 03/18/2018 08:55 PM    Glucose (POC) 96 03/16/2018 04:58 PM    Glucose (POC) 116 (H) 12/12/2012 12:42 PM    Glucose (POC) 99 12/11/2012 11:27 PM     No results for input(s): PH, PCO2, PO2, HCO3, FIO2 in the last 72 hours.   No results for input(s): INR in the last 72 hours. No lab exists for component: INREXT  All Micro Results     Procedure Component Value Units Date/Time    CULTURE, BLOOD [[id number]] Collected:  04/23/19 2111    Order Status:  Completed Specimen:  Blood Updated:  04/29/19 0618     Special Requests: NO SPECIAL REQUESTS        Culture result: NO GROWTH 6 DAYS       CULTURE, WOUND Nicole Reasons STAIN [[id number]]  (Abnormal)  (Susceptibility) Collected:  04/23/19 2111    Order Status:  Completed Specimen:  Wound from Foot Updated:  04/26/19 1326     Special Requests: NO SPECIAL REQUESTS        GRAM STAIN RARE WBCS SEEN         2+ GRAM NEGATIVE RODS               OCCASIONAL GRAM POSITIVE COCCI IN CLUSTERS            RARE GRAM POSITIVE RODS        Culture result:       HEAVY ENTEROCOCCUS FAECALIS GROUP D                  HEAVY PROVIDENCIA RETTGERI                  HEAVY STENOTROPHOMONAS (Daniel Pastrana) MALTOPHILIA CLSI GUIDELINES DO NOT RECOMMEND REPORTING SUSCEPTIBILITIES FOR S. MALTOPHILIA. TRIMETHOPRIM/SULFAMETHOXAZOLE IS THE DRUG OF CHOICE. HEAVY DIPHTHEROIDS       CULTURE, MRSA [[id number]] Collected:  04/23/19 2315    Order Status:  Canceled Specimen:  Nares     CULTURE, WOUND [[id number]] Collected:  04/23/19 2300    Order Status:  Canceled Specimen:  Wound from Foot           Other pertinent lab: none    Total time spent with patient: 39 Minutes I reviewed chart, notes, data and current medications in the medical record. I have examined and treated the patient at bedside during this period.                  Care Plan discussed with: Patient, Care Manager, Nursing Staff and >50% of time spent in counseling and coordination of care    Discussed:  Care Plan    Prophylaxis:  H2B/PPI    Disposition:  Home w/Family           ___________________________________________________    Attending Physician: Kaia Pace MD

## 2019-05-04 NOTE — PROGRESS NOTES
Notified Dr. Nahun Chavez about patient's midline not pulling back any blood return. The midline flushes well with normal saline. Received order to give cathflo and verify with pharmacy correct dosage to order.

## 2019-05-04 NOTE — PROGRESS NOTES
Patient to follow up with urologist out patient with urologist per Dr. Elsie Reyes. Patient to have records transferred to Dr. Kwaku Garcia office and he would follow up.

## 2019-05-04 NOTE — PROGRESS NOTES
[facility] Podiatric Surgery  Progress Note  Subjective:  Jhon Palm: following for left lower extremity ulcerations with cellulitis. Afebrile. ROS:   Constitutional: Negative for fever, chills, weight loss. Positive for fatigue  HENT: Negative for nosebleeds and congestion. Eyes: Negative for blurred vision and double vision. Respiratory: Negative for cough, shortness of breath and wheezing. Cardiovascular: Negative for chest pain, palpitations, claudication. Positive for leg swelling  Musculoskeletal: positive for weakness and pain  Skin: positive for wounds  Neurological: Negative for dizziness. Positive for weakness    Psychiatric/Behavioral: positive for depression    Objective:  Blood pressure 144/73, pulse 61, temperature 97.7 °F (36.5 °C), resp. rate 17, height 6' 1\" (1.854 m), weight 108.9 kg (240 lb), SpO2 96 %. Intake/Output Summary (Last 24 hours) at 5/4/2019 1922  Last data filed at 5/4/2019 1533  Gross per 24 hour   Intake 600 ml   Output 1150 ml   Net -550 ml         Physical Exam:  General appearance: alert, cooperative, no distress, appears stated age     Lower Extremity Exam:  Vascular:    Dorsalis Pedis Pulse: present  Posterior Tibialis Pulse: present  Popliteal and Femoral Pulses: present  Skin Temp: warm to warm right and left lower extremities legs to toes  Extremity Edema: +1 pitting with chronic hemosiderin deposition  Varicosities: present     Neurological:  Deep Tendon Reflexes of Achilles and Patellar: diminished but intact right.  Diminished left  Epicritic and Protective Sensations: absent     Deep Pain Response: present     Dermatological:  Toenails: mycotic 1-5 right and left foot  Ulceration:  Dressing intact without strikethrough     Rash: absent     Musculoskeletal:  Gait and Station: antalgic and foot drop    Labs:  Lab Results   Component Value Date/Time    WBC 11.5 (H) 05/04/2019 08:30 AM    HGB 13.1 05/04/2019 08:30 AM    HCT 40.3 05/04/2019 08:30 AM    MCV 90.6 05/04/2019 08:30 AM    PLATELET 975 90/99/3591 08:30 AM     Lab Results   Component Value Date/Time    Sodium 138 05/04/2019 08:30 AM    Potassium 4.4 05/04/2019 08:30 AM    Chloride 104 05/04/2019 08:30 AM    CO2 30 05/04/2019 08:30 AM    BUN 25 (H) 05/04/2019 08:30 AM    Glucose 106 (H) 05/04/2019 08:30 AM     Lab Results   Component Value Date/Time    INR 1.1 12/21/2017 11:28 AM     Current Medications:    Current Facility-Administered Medications:     ibuprofen (MOTRIN) tablet 200 mg, 200 mg, Oral, TID, Jazzmine Yañez MD, 200 mg at 05/04/19 1516    oxyCODONE IR (ROXICODONE) tablet 20 mg, 20 mg, Oral, Q6H PRN, Dewey Merlos MD, 20 mg at 05/04/19 1708    gabapentin (NEURONTIN) capsule 400 mg, 400 mg, Oral, BID, Dewey Merlos MD, 400 mg at 05/04/19 1708    lisinopril (PRINIVIL, ZESTRIL) tablet 40 mg, 40 mg, Oral, DAILY, Divine Phoenix, DO, 40 mg at 83/96/27 1784    folic acid (FOLVITE) tablet 1 mg, 1 mg, Oral, DAILY, Divine Phoenix, DO, 1 mg at 05/04/19 7709    thiamine mononitrate (B-1) tablet 100 mg, 100 mg, Oral, DAILY, Divine Phoenix, DO, 100 mg at 05/04/19 0948    therapeutic multivitamin (THERAGRAN) tablet 1 Tab, 1 Tab, Oral, DAILY, Rafa Phoenix DO, 1 Tab at 05/04/19 7819    nicotine (NICORETTE) gum 4 mg, 4 mg, Oral, Q2H PRN, Rafa Phoenix, DO    ondansetron [facility]) injection 4 mg, 4 mg, IntraVENous, Q6H PRN, Divine Phoenix, DO, 4 mg at 05/04/19 1516    pantoprazole (PROTONIX) tablet 40 mg, 40 mg, Oral, ACB, Yovanny Phoenix, DO, 40 mg at 05/04/19 0430    amLODIPine (NORVASC) tablet 10 mg, 10 mg, Oral, DAILY, Rafa Pacheco MD, 10 mg at 05/04/19 0817    TIGEcycline (TYGACIL) 50 mg in 0.9% sodium chloride (MBP/ADV) 100 mL, 50 mg, IntraVENous, Q12H, Karlee Rajan, DO, Last Rate: 100 mL/hr at 05/04/19 1709, 50 mg at 05/04/19 1709    cefTRIAXone (ROCEPHIN) 2 g in 0.9% sodium chloride (MBP/ADV) 50 mL, 2 g, IntraVENous, Q24H, Kiki Denver, Mark, DO, Last Rate: 100 mL/hr at 05/04/19 1516, 2 g at 05/04/19 1516    sertraline (ZOLOFT) tablet 50 mg, 50 mg, Oral, DAILY, Tori Hunterrison, NP, 50 mg at 05/04/19 0816    traZODone (DESYREL) tablet 50 mg, 50 mg, Oral, QHS, Tori Kaur, NP, 50 mg at 05/03/19 2224    hydrALAZINE (APRESOLINE) 20 mg/mL injection 10 mg, 10 mg, IntraVENous, Q6H PRN, Pica, Althea Rias, DPM    lactobac ac& pc-s.therm-b.anim (YULIA Q/RISAQUAD), 1 Cap, Oral, DAILY, Pica, Althea Rias, DPM, 1 Cap at 05/04/19 0788    allopurinol (ZYLOPRIM) tablet 100 mg, 100 mg, Oral, DAILY, Pica, Althea Rias, DPM, 100 mg at 05/04/19 0818    levothyroxine (SYNTHROID) tablet 88 mcg, 88 mcg, Oral, ACB, Pica, Althea Rias, DPM, 88 mcg at 05/04/19 0430    melatonin tablet 3 mg, 3 mg, Oral, QHS PRN, Pica, Althea Rias, DPM, 3 mg at 04/25/19 0426    tamsulosin (FLOMAX) capsule 0.4 mg, 0.4 mg, Oral, DAILY, Pica, Althea Rias, DPM, 0.4 mg at 05/04/19 0817    methadone (DOLOPHINE) 10 mg/mL concentrated solution 130 mg, 130 mg, Oral, DAILY, Pica, Althea Rias, DPM, 130 mg at 05/04/19 0817    sodium chloride (NS) flush 5-40 mL, 5-40 mL, IntraVENous, Q8H, Pica, Tushar N, DPM, 10 mL at 05/04/19 1516    sodium chloride (NS) flush 5-40 mL, 5-40 mL, IntraVENous, PRN, Pica, Althea Rias, DPM, 10 mL at 04/30/19 2032    acetaminophen (TYLENOL) tablet 650 mg, 650 mg, Oral, Q4H PRN, Pica, Althea Rias, DPM, 650 mg at 05/04/19 0103    naloxone (NARCAN) injection 0.4 mg, 0.4 mg, IntraVENous, PRN, Pica, Althea Rias, DPM    enoxaparin (LOVENOX) injection 40 mg, 40 mg, SubCUTAneous, Q24H, Pica, Althea Rias, DPM, 40 mg at 05/04/19 1043    Pathology:     FINAL PATHOLOGIC DIAGNOSIS   Skin, left foot, biopsy:   Ulcer and granulation tissue   No evidence of malignancy   See comment   Comment   Microscopic examination of the H&E slides shows epidermal necrosis with overlying crust containing numerous neutrophils. Underlying the ulcer is granulation tissue-like vascular proliferation and mild neutrophilic inflammation.  Gram and Giemsa stains are negative for bacteria and parasites. [employer] and Norma stains are negative for mycobacteria. GMS and PAS stains are negative for fungal organisms and a Warthin's starry stain is negative for spirochetes. Negative stains do not entirely exclude an infectious etiology. Otherwise, the findings are not specific but could be seen in pyoderma gangrenosa. However, this is a diagnosis of exclusion and requires clinical and microbiologic correlation. Impression  1. Ulcerations x2 left lower extremity  2. Venous insufficiency. Plan: -. Reviewed pathology. No definitive diagnosis. Venous insufficiency likely given patient compliance issues with previous management. I have recommended a trial of unna boot compression therapy which will be changed weekly in my office. Explained that follow up and treatment consistency is critical for wound healing and limb salvage. Antibiotics per ID.     - Dressing intact without strikethrough. Will change tomorrow. Following. Lauren Kaur DPM FACFAS FACCWS [facility]  Triple Board Certified Foot & Ankle Specialist  [phone] cell

## 2019-05-05 PROCEDURE — 74011250636 HC RX REV CODE- 250/636: Performed by: INTERNAL MEDICINE

## 2019-05-05 PROCEDURE — 74011250637 HC RX REV CODE- 250/637: Performed by: PODIATRIST

## 2019-05-05 PROCEDURE — 74011250637 HC RX REV CODE- 250/637: Performed by: NURSE PRACTITIONER

## 2019-05-05 PROCEDURE — 74011000258 HC RX REV CODE- 258: Performed by: INTERNAL MEDICINE

## 2019-05-05 PROCEDURE — 74011250637 HC RX REV CODE- 250/637: Performed by: INTERNAL MEDICINE

## 2019-05-05 PROCEDURE — 74011250637 HC RX REV CODE- 250/637: Performed by: HOSPITALIST

## 2019-05-05 PROCEDURE — 74011250636 HC RX REV CODE- 250/636: Performed by: PODIATRIST

## 2019-05-05 PROCEDURE — 94760 N-INVAS EAR/PLS OXIMETRY 1: CPT

## 2019-05-05 PROCEDURE — 65270000029 HC RM PRIVATE

## 2019-05-05 RX ADMIN — CEFTRIAXONE SODIUM 2 G: 2 INJECTION, POWDER, FOR SOLUTION INTRAMUSCULAR; INTRAVENOUS at 15:10

## 2019-05-05 RX ADMIN — ACETAMINOPHEN 650 MG: 325 TABLET ORAL at 01:13

## 2019-05-05 RX ADMIN — OXYCODONE HYDROCHLORIDE 20 MG: 5 TABLET ORAL at 04:29

## 2019-05-05 RX ADMIN — OXYCODONE HYDROCHLORIDE 20 MG: 5 TABLET ORAL at 12:02

## 2019-05-05 RX ADMIN — ACETAMINOPHEN 650 MG: 325 TABLET ORAL at 19:51

## 2019-05-05 RX ADMIN — OXYCODONE HYDROCHLORIDE 20 MG: 5 TABLET ORAL at 18:11

## 2019-05-05 RX ADMIN — Medication 1 CAPSULE: at 08:20

## 2019-05-05 RX ADMIN — IBUPROFEN 200 MG: 200 TABLET, FILM COATED ORAL at 21:37

## 2019-05-05 RX ADMIN — IBUPROFEN 200 MG: 200 TABLET, FILM COATED ORAL at 08:21

## 2019-05-05 RX ADMIN — ONDANSETRON 4 MG: 2 INJECTION INTRAMUSCULAR; INTRAVENOUS at 12:28

## 2019-05-05 RX ADMIN — AMLODIPINE BESYLATE 10 MG: 5 TABLET ORAL at 08:20

## 2019-05-05 RX ADMIN — Medication 10 ML: at 22:00

## 2019-05-05 RX ADMIN — TRAZODONE HYDROCHLORIDE 50 MG: 50 TABLET ORAL at 21:37

## 2019-05-05 RX ADMIN — LEVOTHYROXINE SODIUM 88 MCG: 88 TABLET ORAL at 04:29

## 2019-05-05 RX ADMIN — Medication 10 ML: at 13:36

## 2019-05-05 RX ADMIN — GABAPENTIN 400 MG: 100 CAPSULE ORAL at 17:33

## 2019-05-05 RX ADMIN — FOLIC ACID 1 MG: 1 TABLET ORAL at 08:21

## 2019-05-05 RX ADMIN — SODIUM CHLORIDE 50 MG: 900 INJECTION, SOLUTION INTRAVENOUS at 17:34

## 2019-05-05 RX ADMIN — SODIUM CHLORIDE 50 MG: 900 INJECTION, SOLUTION INTRAVENOUS at 04:32

## 2019-05-05 RX ADMIN — ACETAMINOPHEN 650 MG: 325 TABLET ORAL at 06:37

## 2019-05-05 RX ADMIN — Medication 100 MG: at 08:21

## 2019-05-05 RX ADMIN — TAMSULOSIN HYDROCHLORIDE 0.4 MG: 0.4 CAPSULE ORAL at 08:21

## 2019-05-05 RX ADMIN — GABAPENTIN 400 MG: 100 CAPSULE ORAL at 08:20

## 2019-05-05 RX ADMIN — Medication 10 ML: at 04:32

## 2019-05-05 RX ADMIN — IBUPROFEN 200 MG: 200 TABLET, FILM COATED ORAL at 15:10

## 2019-05-05 RX ADMIN — SERTRALINE HYDROCHLORIDE 50 MG: 50 TABLET ORAL at 08:21

## 2019-05-05 RX ADMIN — ALLOPURINOL 100 MG: 100 TABLET ORAL at 08:21

## 2019-05-05 RX ADMIN — METHADONE HYDROCHLORIDE 130 MG: 10 CONCENTRATE ORAL at 08:20

## 2019-05-05 RX ADMIN — LISINOPRIL 40 MG: 20 TABLET ORAL at 08:20

## 2019-05-05 RX ADMIN — THERA TABS 1 TABLET: TAB at 08:21

## 2019-05-05 RX ADMIN — PANTOPRAZOLE SODIUM 40 MG: 40 TABLET, DELAYED RELEASE ORAL at 04:29

## 2019-05-05 NOTE — PROGRESS NOTES
Bedside shift change report given to [address] [place] [address] (oncoming nurse) by Isabel Haywood (offgoing nurse). Report included the following information SBAR, Kardex, MAR and Recent Results.

## 2019-05-06 PROCEDURE — 74011250636 HC RX REV CODE- 250/636: Performed by: INTERNAL MEDICINE

## 2019-05-06 PROCEDURE — 97530 THERAPEUTIC ACTIVITIES: CPT

## 2019-05-06 PROCEDURE — 94760 N-INVAS EAR/PLS OXIMETRY 1: CPT

## 2019-05-06 PROCEDURE — 77030036687 HC SHOE PSTOP S2SG -A

## 2019-05-06 PROCEDURE — 74011250637 HC RX REV CODE- 250/637: Performed by: PODIATRIST

## 2019-05-06 PROCEDURE — 97535 SELF CARE MNGMENT TRAINING: CPT

## 2019-05-06 PROCEDURE — 74011250637 HC RX REV CODE- 250/637: Performed by: INTERNAL MEDICINE

## 2019-05-06 PROCEDURE — 74011000258 HC RX REV CODE- 258: Performed by: INTERNAL MEDICINE

## 2019-05-06 PROCEDURE — 74011250637 HC RX REV CODE- 250/637: Performed by: HOSPITALIST

## 2019-05-06 PROCEDURE — 74011250637 HC RX REV CODE- 250/637: Performed by: NURSE PRACTITIONER

## 2019-05-06 PROCEDURE — 65270000029 HC RM PRIVATE

## 2019-05-06 PROCEDURE — 74011250636 HC RX REV CODE- 250/636: Performed by: PODIATRIST

## 2019-05-06 RX ADMIN — Medication 10 ML: at 15:24

## 2019-05-06 RX ADMIN — TRAZODONE HYDROCHLORIDE 50 MG: 50 TABLET ORAL at 21:36

## 2019-05-06 RX ADMIN — IBUPROFEN 200 MG: 200 TABLET, FILM COATED ORAL at 15:25

## 2019-05-06 RX ADMIN — ENOXAPARIN SODIUM 40 MG: 40 INJECTION SUBCUTANEOUS at 08:27

## 2019-05-06 RX ADMIN — FOLIC ACID 1 MG: 1 TABLET ORAL at 08:25

## 2019-05-06 RX ADMIN — IBUPROFEN 200 MG: 200 TABLET, FILM COATED ORAL at 08:25

## 2019-05-06 RX ADMIN — ACETAMINOPHEN 650 MG: 325 TABLET ORAL at 02:52

## 2019-05-06 RX ADMIN — GABAPENTIN 400 MG: 100 CAPSULE ORAL at 18:16

## 2019-05-06 RX ADMIN — GABAPENTIN 400 MG: 100 CAPSULE ORAL at 08:25

## 2019-05-06 RX ADMIN — ONDANSETRON 4 MG: 2 INJECTION INTRAMUSCULAR; INTRAVENOUS at 15:24

## 2019-05-06 RX ADMIN — TAMSULOSIN HYDROCHLORIDE 0.4 MG: 0.4 CAPSULE ORAL at 08:25

## 2019-05-06 RX ADMIN — LISINOPRIL 40 MG: 20 TABLET ORAL at 08:25

## 2019-05-06 RX ADMIN — OXYCODONE HYDROCHLORIDE 20 MG: 5 TABLET ORAL at 06:00

## 2019-05-06 RX ADMIN — OXYCODONE HYDROCHLORIDE 20 MG: 5 TABLET ORAL at 00:01

## 2019-05-06 RX ADMIN — OXYCODONE HYDROCHLORIDE 20 MG: 5 TABLET ORAL at 23:46

## 2019-05-06 RX ADMIN — OXYCODONE HYDROCHLORIDE 20 MG: 5 TABLET ORAL at 11:52

## 2019-05-06 RX ADMIN — Medication 1 CAPSULE: at 08:25

## 2019-05-06 RX ADMIN — SERTRALINE HYDROCHLORIDE 50 MG: 50 TABLET ORAL at 08:25

## 2019-05-06 RX ADMIN — CEFTRIAXONE SODIUM 2 G: 2 INJECTION, POWDER, FOR SOLUTION INTRAMUSCULAR; INTRAVENOUS at 15:25

## 2019-05-06 RX ADMIN — Medication 10 ML: at 14:29

## 2019-05-06 RX ADMIN — Medication 10 ML: at 06:00

## 2019-05-06 RX ADMIN — ALLOPURINOL 100 MG: 100 TABLET ORAL at 08:24

## 2019-05-06 RX ADMIN — SODIUM CHLORIDE 50 MG: 900 INJECTION, SOLUTION INTRAVENOUS at 18:15

## 2019-05-06 RX ADMIN — PANTOPRAZOLE SODIUM 40 MG: 40 TABLET, DELAYED RELEASE ORAL at 06:00

## 2019-05-06 RX ADMIN — Medication 100 MG: at 08:24

## 2019-05-06 RX ADMIN — IBUPROFEN 200 MG: 200 TABLET, FILM COATED ORAL at 21:36

## 2019-05-06 RX ADMIN — METHADONE HYDROCHLORIDE 130 MG: 10 CONCENTRATE ORAL at 08:21

## 2019-05-06 RX ADMIN — OXYCODONE HYDROCHLORIDE 20 MG: 5 TABLET ORAL at 18:16

## 2019-05-06 RX ADMIN — THERA TABS 1 TABLET: TAB at 08:25

## 2019-05-06 RX ADMIN — Medication 10 ML: at 21:37

## 2019-05-06 RX ADMIN — SODIUM CHLORIDE 50 MG: 900 INJECTION, SOLUTION INTRAVENOUS at 06:01

## 2019-05-06 RX ADMIN — AMLODIPINE BESYLATE 10 MG: 5 TABLET ORAL at 08:24

## 2019-05-06 RX ADMIN — LEVOTHYROXINE SODIUM 88 MCG: 88 TABLET ORAL at 06:00

## 2019-05-06 RX ADMIN — MELATONIN TAB 3 MG 3 MG: 3 TAB at 23:46

## 2019-05-06 NOTE — PROGRESS NOTES
Physical therapy    1345 Pt received in bed, order received for post op shoe. Pt declining donning of shoe stating post op shoe is what started the wound to begin with. Offered pt trial quad cane or noe walker to offer larger base of support to improve standing posture and stability with gait training allowing for reduced sheer from post op shoe, pt continued to decline any OOB activity. Pt also described short CAM boot that he states he cannot manage \"all those straps\" secondary to hemiparetic LUE. Telephone Dr Tim Wilkinson office to inquire of any other orthotic that may be appropriate for pt. Tomasz Landis

## 2019-05-06 NOTE — PROGRESS NOTES
Bedside shift change report given to PACO (oncoming nurse) by Zana Knight (offgoing nurse). Report included the following information SBAR, Kardex and MAR.

## 2019-05-06 NOTE — PROGRESS NOTES
[address] [place] [address] [facility] Progress Note           Tyrone Berry DO    [phone] Office  [phone]  Fax    2019      Assessment & Plan:   1. Chronic left lower extremity wounds. Unable to tolerate MRI. CT negative for OM. S/p I&D by podiatry. No cultures sent. Biopsy inconclusive. . Cultures growing SMALT, enterococcus and providencia. Patient allergic to sulfa. Organism is intermediate to ceftaz and cipro. Sensitive to tigecycline. Tigecycline has poor activity against providencia so added ceftriaxone. Omadacycline (new tetracycline abx) and cefixime may be appropriate for PO deescalation at discharge but insurance will not cover due to cost. . Per discussion with Dr Cindy Amor there is very low suspicion for OM based on sed rate, CT, and operative findings, Patient unable to tolerate MRI. Anticipate patient will remain in hospital to complete abx through 5/10/19  2. Multiple antibiotic allergies include trimethoprim-sulfamethoxazole, ciprofloxacin, and vancomycin. 3.  History of hemorrhagic stroke with left-sided hemiparesis. Subjective:     sleeping    Objective:     Vitals:   Visit Vitals  /71 (BP 1 Location: Left arm, BP Patient Position: Sitting)   Pulse 62   Temp 97.5 °F (36.4 °C)   Resp 18   Ht 6' 1\" (1.854 m)   Wt 108.9 kg (240 lb)   SpO2 95%   BMI 31.66 kg/m²        Tmax:  Temp (24hrs), Av.5 °F (36.4 °C), Min:97.3 °F (36.3 °C), Max:97.7 °F (36.5 °C)      Exam:   Patient is intubated:  no    Physical Examination:   General:  NAD   Head:     Eyes:     Neck:        Lungs:   No distress   Chest wall:     Heart:     Abdomen:   non-distended   Extremities:    Skin: LLE dressed   Neurologic:      Labs:        No lab exists for component: ITNL   No results for input(s): CPK, CKMB, TROIQ in the last 72 hours.   Recent Labs     19  0830      K 4.4      CO2 30   BUN 25*   CREA 0.76   *   WBC 11.5*   HGB 13.1   HCT 40.3        No results for input(s): INR, PTP, APTT in the last 72 hours. No lab exists for component: INREXT, INREXT  Needs: urine analysis, urine sodium, protein and creatinine  No results found for: VIVEK, CREAU      Cultures:     Lab Results   Component Value Date/Time    Specimen Description: BLOOD 02/04/2010 10:55 PM    Specimen Description: BLOOD 02/12/2009 11:30 PM     Lab Results   Component Value Date/Time    Culture result: NO GROWTH 6 DAYS 04/23/2019 09:11 PM    Culture result: HEAVY ENTEROCOCCUS FAECALIS GROUP D (A) 04/23/2019 09:11 PM    Culture result: HEAVY PROVIDENCIA RETTGERI (A) 04/23/2019 09:11 PM    Culture result: (A) 04/23/2019 09:11 PM     HEAVY STENOTROPHOMONAS (Odilon Lin) MALTOPHILIA CLSI GUIDELINES DO NOT RECOMMEND [address]. TRIMETHOPRIM/SULFAMETHOXAZOLE IS THE DRUG OF CHOICE.     Culture result: HEAVY DIPHTHEROIDS (A) 04/23/2019 09:11 PM       Radiology:     Medications       Current Facility-Administered Medications   Medication Dose Route Frequency Last Dose    ibuprofen (MOTRIN) tablet 200 mg  200 mg Oral  mg at 05/06/19 0825    oxyCODONE IR (ROXICODONE) tablet 20 mg  20 mg Oral Q6H PRN 20 mg at 05/06/19 0600    gabapentin (NEURONTIN) capsule 400 mg  400 mg Oral  mg at 05/06/19 0825    lisinopril (PRINIVIL, ZESTRIL) tablet 40 mg  40 mg Oral DAILY 40 mg at 40/38/57 1847    folic acid (FOLVITE) tablet 1 mg  1 mg Oral DAILY 1 mg at 05/06/19 0825    thiamine mononitrate (B-1) tablet 100 mg  100 mg Oral DAILY 100 mg at 05/06/19 0824    therapeutic multivitamin (THERAGRAN) tablet 1 Tab  1 Tab Oral DAILY 1 Tab at 05/06/19 0825    nicotine (NICORETTE) gum 4 mg  4 mg Oral Q2H PRN      ondansetron (ZOFRAN) injection 4 mg  4 mg IntraVENous Q6H PRN 4 mg at 05/05/19 1228    pantoprazole (PROTONIX) tablet 40 mg  40 mg Oral ACB Stopped at 05/06/19 0730    amLODIPine (NORVASC) tablet 10 mg  10 mg Oral DAILY 10 mg at 05/06/19 0824    TIGEcycline (TYGACIL) 50 mg in 0.9% sodium chloride (MBP/ADV) 100 mL  50 mg IntraVENous Q12H 50 mg at 05/06/19 0601    cefTRIAXone (ROCEPHIN) 2 g in 0.9% sodium chloride (MBP/ADV) 50 mL  2 g IntraVENous Q24H 2 g at 05/05/19 1510    sertraline (ZOLOFT) tablet 50 mg  50 mg Oral DAILY 50 mg at 05/06/19 0825    traZODone (DESYREL) tablet 50 mg  50 mg Oral QHS 50 mg at 05/05/19 2137    hydrALAZINE (APRESOLINE) 20 mg/mL injection 10 mg  10 mg IntraVENous Q6H PRN      lactobac ac& pc-s.therm-b.anim (YULIA Q/RISAQUAD)  1 Cap Oral DAILY 1 Cap at 05/06/19 0825    allopurinol (ZYLOPRIM) tablet 100 mg  100 mg Oral DAILY 100 mg at 05/06/19 0824    levothyroxine (SYNTHROID) tablet 88 mcg  88 mcg Oral ACB Stopped at 05/06/19 0730    melatonin tablet 3 mg  3 mg Oral QHS PRN 3 mg at 04/25/19 0426    tamsulosin (FLOMAX) capsule 0.4 mg  0.4 mg Oral DAILY 0.4 mg at 05/06/19 0825    methadone (DOLOPHINE) 10 mg/mL concentrated solution 130 mg  130 mg Oral DAILY 130 mg at 05/06/19 0821    sodium chloride (NS) flush 5-40 mL  5-40 mL IntraVENous Q8H 10 mL at 05/06/19 0600    sodium chloride (NS) flush 5-40 mL  5-40 mL IntraVENous PRN 10 mL at 04/30/19 2032    acetaminophen (TYLENOL) tablet 650 mg  650 mg Oral Q4H  mg at 05/06/19 0252    naloxone (NARCAN) injection 0.4 mg  0.4 mg IntraVENous PRN      enoxaparin (LOVENOX) injection 40 mg  40 mg SubCUTAneous Q24H 40 mg at 05/06/19 0827           Case discussed with:       Skylar Hansen DO

## 2019-05-06 NOTE — PROGRESS NOTES
hospitalist note indicated pt wished to be seen regarding bladder cancer.   Pt declines and wants to follow up with urologist in [place]  No acute needs, will sign off

## 2019-05-06 NOTE — PROGRESS NOTES
[facility]    Medical Progress Note      NAME: Art Vasquez   :  1965  MRM:  [id number]    Date/Time: 2019  8:14 AM          Assessment and Plan:     Left foot infection - POA, I&D of ulcer with Bx by podiatry on . CT showed no drainable abscess or e/o osteomyelitis. Wound culture polymicrobial, including enterococcus, Providencia, and Stenotrophomonas. ID consulted and are giving IV tigecycline and CTX till 5/10/19. Lack of insurance coverage to do this outside hospital. OP vascular f/u with wound care and DEVEN  Boots recommended by podiatry      HTN - Stable on lisinopril and Norvasc. IV hydralazine PRN     Chronic pain syndrome / Depression - Continue methadone (confirmed by pharmacy) and now gabapentin and oxycodone (increased temporarily due to acute pain but patient informed no pain meds will be given at discharge). Appreciate psychiatry consult. We have started sertraline and prn QHS trazodone.     ETOH abuse - per report. Pt denies. No signs of withdrawal. Goody vitamins      Gout - No symptoms on allopurinol.     Malignant neoplasm of urinary bladder - s/p surgery in 2018 at [facility]. Cr stable. Has missed his outpatient followup and he requests Urology to see him here. Continue flomax     Hypothyroidism - TSH markedly elevated presumed due to non compliance. Continue Synthroid at current dose for now, but repeat TFTs in 4 weeks     Hx of hemorrhagic stroke with left sided hemiparesis / Physical debility - Uses wheelchair and walker at baseline. PT/OT consulted. Continue supportive care     Chronic obstructive pulmonary disease - Stable. PRN nebs    Tobacco abuse - nicoderm         Subjective:     Chief Complaint:  Pain stable. No events overnight and no complaints this AM    ROS:  (bold if positive, if negative)    Tolerating some PT  Tolerating Diet        Objective:     Last 24hrs VS reviewed since prior progress note.  Most recent are:    Visit Vitals  BP 127/71 (BP 1 Location: Left arm, BP Patient Position: Sitting)   Pulse 62   Temp 97.5 °F (36.4 °C)   Resp 18   Ht 6' 1\" (1.854 m)   Wt 108.9 kg (240 lb)   SpO2 95%   BMI 31.66 kg/m²     SpO2 Readings from Last 6 Encounters:   05/06/19 95%   10/12/18 96%   09/28/18 95%   08/03/18 100%   08/01/18 95%   06/08/18 97%    O2 Flow Rate (L/min): 8 l/min       Intake/Output Summary (Last 24 hours) at 5/6/2019 0814  Last data filed at 5/6/2019 0609  Gross per 24 hour   Intake 600 ml   Output 2400 ml   Net -1800 ml        Physical Exam:    Gen:  Obese, in no acute distress  HEENT:  Pink conjunctivae, PERRL, hearing intact to voice, moist mucous membranes  Neck:  Supple, without masses, thyroid non-tender  Resp:  No accessory muscle use, clear breath sounds without wheezes rales or rhonchi  Card:  No murmurs, normal S1, S2 without thrills, bruits or peripheral edema  Abd:  Soft, non-tender, non-distended, normoactive bowel sounds are present, no mass  Lymph:  No cervical or inguinal adenopathy  Musc:  No cyanosis or clubbing  Skin:  LLE in dressing CDI, skin turgor is good  Neuro:  Cranial nerves are grossly intact, general motor weakness, follows commands   Psych:  Poor insight, oriented to person, place and time, alert    Telemetry reviewed:   normal sinus rhythm  __________________________________________________________________  Medications Reviewed: (see below)  Medications:     Current Facility-Administered Medications   Medication Dose Route Frequency    ibuprofen (MOTRIN) tablet 200 mg  200 mg Oral TID    oxyCODONE IR (ROXICODONE) tablet 20 mg  20 mg Oral Q6H PRN    gabapentin (NEURONTIN) capsule 400 mg  400 mg Oral BID    lisinopril (PRINIVIL, ZESTRIL) tablet 40 mg  40 mg Oral DAILY    folic acid (FOLVITE) tablet 1 mg  1 mg Oral DAILY    thiamine mononitrate (B-1) tablet 100 mg  100 mg Oral DAILY    therapeutic multivitamin (THERAGRAN) tablet 1 Tab  1 Tab Oral DAILY    nicotine (NICORETTE) gum 4 mg  4 mg Oral Q2H PRN    ondansetron (ZOFRAN) injection 4 mg  4 mg IntraVENous Q6H PRN    pantoprazole (PROTONIX) tablet 40 mg  40 mg Oral ACB    amLODIPine (NORVASC) tablet 10 mg  10 mg Oral DAILY    TIGEcycline (TYGACIL) 50 mg in 0.9% sodium chloride (MBP/ADV) 100 mL  50 mg IntraVENous Q12H    cefTRIAXone (ROCEPHIN) 2 g in 0.9% sodium chloride (MBP/ADV) 50 mL  2 g IntraVENous Q24H    sertraline (ZOLOFT) tablet 50 mg  50 mg Oral DAILY    traZODone (DESYREL) tablet 50 mg  50 mg Oral QHS    hydrALAZINE (APRESOLINE) 20 mg/mL injection 10 mg  10 mg IntraVENous Q6H PRN    lactobac ac& pc-s.therm-b.anim (YULIA Q/RISAQUAD)  1 Cap Oral DAILY    allopurinol (ZYLOPRIM) tablet 100 mg  100 mg Oral DAILY    levothyroxine (SYNTHROID) tablet 88 mcg  88 mcg Oral ACB    melatonin tablet 3 mg  3 mg Oral QHS PRN    tamsulosin (FLOMAX) capsule 0.4 mg  0.4 mg Oral DAILY    methadone (DOLOPHINE) 10 mg/mL concentrated solution 130 mg  130 mg Oral DAILY    sodium chloride (NS) flush 5-40 mL  5-40 mL IntraVENous Q8H    sodium chloride (NS) flush 5-40 mL  5-40 mL IntraVENous PRN    acetaminophen (TYLENOL) tablet 650 mg  650 mg Oral Q4H PRN    naloxone (NARCAN) injection 0.4 mg  0.4 mg IntraVENous PRN    enoxaparin (LOVENOX) injection 40 mg  40 mg SubCUTAneous Q24H        Lab Data Reviewed: (see below)  Lab Review:     Recent Labs     05/04/19  0830   WBC 11.5*   HGB 13.1   HCT 40.3        Recent Labs     05/04/19  0830      K 4.4      CO2 30   *   BUN 25*   CREA 0.76   CA 8.6     Lab Results   Component Value Date/Time    Glucose (POC) 150 (H) 03/19/2018 11:45 AM    Glucose (POC) 123 (H) 03/18/2018 08:55 PM    Glucose (POC) 96 03/16/2018 04:58 PM    Glucose (POC) 116 (H) 12/12/2012 12:42 PM    Glucose (POC) 99 12/11/2012 11:27 PM     No results for input(s): PH, PCO2, PO2, HCO3, FIO2 in the last 72 hours. No results for input(s): INR in the last 72 hours.     No lab exists for component: INREXT, INREXT  All Micro Results     Procedure Component Value Units Date/Time    CULTURE, BLOOD [[id number]] Collected:  04/23/19 2111    Order Status:  Completed Specimen:  Blood Updated:  04/29/19 0618     Special Requests: NO SPECIAL REQUESTS        Culture result: NO GROWTH 6 DAYS       CULTURE, WOUND Blaine Dew STAIN [[id number]]  (Abnormal)  (Susceptibility) Collected:  04/23/19 2111    Order Status:  Completed Specimen:  Wound from Foot Updated:  04/26/19 1326     Special Requests: NO SPECIAL REQUESTS        GRAM STAIN RARE WBCS SEEN         2+ GRAM NEGATIVE RODS               OCCASIONAL GRAM POSITIVE COCCI IN CLUSTERS            RARE GRAM POSITIVE RODS        Culture result:       HEAVY ENTEROCOCCUS FAECALIS GROUP D                  HEAVY PROVIDENCIA RETTGERI                  HEAVY STENOTROPHOMONAS (Selestine Sequin.) MALTOPHILIA CLSI GUIDELINES DO NOT RECOMMEND REPORTING SUSCEPTIBILITIES FOR S. MALTOPHILIA. TRIMETHOPRIM/SULFAMETHOXAZOLE IS THE DRUG OF CHOICE. HEAVY DIPHTHEROIDS       CULTURE, MRSA [[id number]] Collected:  04/23/19 2315    Order Status:  Canceled Specimen:  Nares     CULTURE, WOUND [[id number]] Collected:  04/23/19 2300    Order Status:  Canceled Specimen:  Wound from Foot           Other pertinent lab: none    Total time spent with patient: 25 Minutes I reviewed chart, notes, data and current medications in the medical record. I have examined and treated the patient at bedside during this period.                  Care Plan discussed with: Patient, Care Manager, Nursing Staff and >50% of time spent in counseling and coordination of care    Discussed:  Care Plan    Prophylaxis:  H2B/PPI    Disposition:  Home w/Family           ___________________________________________________    Attending Physician: Rohit Rodrigues MD

## 2019-05-06 NOTE — PROGRESS NOTES
5/6/2019  3:31 PM  Pt discussed in IDR rounds, pt is continuing to require medical management, for Lt LE ulcerations w/ cellulitis, CT negative for Osteomyelitis, ID is following for final ABx plan. D/C plan home when stable w/ podiatry f/u  CM will continue to follow.   Bere Salcedo

## 2019-05-06 NOTE — PROGRESS NOTES
Bedside and Verbal shift change report given to Concetta Castro (oncoming nurse) by Randee Sarkar RN and Sla Aranda RN (offgoing nurse). Report included the following information SBAR, Kardex, MAR, Accordion and Recent Results.

## 2019-05-06 NOTE — PROGRESS NOTES
Problem: Self Care Deficits Care Plan (Adult)  Goal: *Acute Goals and Plan of Care (Insert Text)  Description  Occupational Therapy Goals  Initiated 4/30/2019  1. Patient will perform lower body dressing with modified independence within 7 day(s). 2.  Patient will perform bathing with modified independence within 7 day(s). 3.  Patient will perform grooming, standing at sink, with modified independence within 7 day(s). 4.  Patient will perform toilet transfers with modified independence within 7 day(s). 5.  Patient will perform all aspects of toileting with modified independence within 7 day(s). 6.  Patient will participate in upper extremity therapeutic exercise/activities with supervision/set-up for 10 minutes within 7 day(s). 7.  Patient will utilize energy conservation techniques during functional activities without cues within 7 day(s). Outcome: Progressing Towards Goal  OCCUPATIONAL THERAPY TREATMENT  Patient: Jacob Schultz (73 y.o. male)  Date: 5/6/2019  Diagnosis: Foot infection [L08.9] Left foot infection  Procedure(s) (LRB):  IRRIGATION AND DEBRIDEMENT LEFT FOOT, BIOPSY LEFT FOOT (POP/SAPH BLOCK) (Left) 11 Days Post-Op  Precautions: Fall  Chart, occupational therapy assessment, plan of care, and goals were reviewed. ASSESSMENT:  Cleared by RN to see pt for therapy session. Pt received supine in bed, LLE elevated and in unna boot. Discussed order for post op shoe per MD with pt. Pt flatly declining to wear postop shoe, reported he had worn a postop shoe before and that it had contributed to his wounds. Initially not agreeable to walk in room, but transferred supine>sit with CGA in order to use urinal. Pt's sitting balance mildly impaired 2/2 decreased trunk control but improved from previous session. LB dressing ADL and grooming ADL performed with supervision/setup in unsupported sit.  Returned to check on pt after seated toileting ADL EOB, and he was ambulating back from bathroom with nursing. CGA using SPC, pt unsteady with antalgic gait however declining practice with AD that PTA had brought by earlier (hemiwalker, quad cane). Returned to bed at end of session with call bell in reach, alarm set and needs met. Pt will benefit from continued OT to maximize functional performance. Progression toward goals:  ?       Improving appropriately and progressing toward goals  ? Improving slowly and progressing toward goals  ? Not making progress toward goals and plan of care will be adjusted     PLAN:  Patient continues to benefit from skilled intervention to address the above impairments. Continue treatment per established plan of care. Discharge Recommendations:  Rehab vs. HHOT  Further Equipment Recommendations for Discharge:  TBD      SUBJECTIVE:   Patient stated ? I'm not going to wear that shoe. ?    OBJECTIVE DATA SUMMARY:   Cognitive/Behavioral Status:  Neurologic State: Alert  Orientation Level: Oriented X4  Cognition: Follows commands  Perception: Appears intact  Perseveration: No perseveration noted  Safety/Judgement: Awareness of environment; Fall prevention;Decreased insight into deficits; Decreased awareness of need for safety    Functional Mobility and Transfers for ADLs:  Bed Mobility:  Supine to Sit: Contact guard assistance(decreased trunk control)    Transfers:  Sit to Stand: Contact guard assistance; Adaptive equipment; Additional time(SPC)          Balance:  Sitting: Impaired; Without support  Sitting - Static: Good (unsupported)  Sitting - Dynamic: Fair (occasional)  Standing: Impaired; With support(SPC)  Standing - Static: Fair  Standing - Dynamic : Fair    ADL Intervention:       Grooming  Brushing/Combing Hair: Supervision/set-up(seated EOB)    Lower Body Dressing Assistance  Socks: Supervision/set-up(R sock only)    Toileting  Bladder Hygiene: Supervision/set-up(seated EOB)    Cognitive Retraining  Safety/Judgement: Awareness of environment; Fall prevention;Decreased insight into deficits; Decreased awareness of need for safety    Pain:  Pain Scale 1: Numeric (0 - 10)  Pain Intensity 1: 7  Pain Location 1: Leg  Pain Orientation 1: Left; Lower  Pain Description 1: Constant  Pain Intervention(s) 1: Medication (see MAR)  Activity Tolerance:   Good  Please refer to the flowsheet for vital signs taken during this treatment. After treatment:   ? Patient left in no apparent distress sitting up in chair  ? Patient left in no apparent distress in bed  ? Call bell left within reach  ? Nursing notified  ? Caregiver present  ?  Bed alarm activated    COMMUNICATION/COLLABORATION:   The patient?s plan of care was discussed with: Physical Therapy Assistant and Registered Nurse    Scot Nolasco, OT  Time Calculation: 23 mins

## 2019-05-07 PROCEDURE — 74011250637 HC RX REV CODE- 250/637: Performed by: NURSE PRACTITIONER

## 2019-05-07 PROCEDURE — 74011250636 HC RX REV CODE- 250/636: Performed by: PODIATRIST

## 2019-05-07 PROCEDURE — 94760 N-INVAS EAR/PLS OXIMETRY 1: CPT

## 2019-05-07 PROCEDURE — 74011250636 HC RX REV CODE- 250/636: Performed by: INTERNAL MEDICINE

## 2019-05-07 PROCEDURE — 74011250637 HC RX REV CODE- 250/637: Performed by: INTERNAL MEDICINE

## 2019-05-07 PROCEDURE — 74011250637 HC RX REV CODE- 250/637: Performed by: PODIATRIST

## 2019-05-07 PROCEDURE — 74011000258 HC RX REV CODE- 258: Performed by: INTERNAL MEDICINE

## 2019-05-07 PROCEDURE — 77030020847 HC STATLOK BARD -A

## 2019-05-07 PROCEDURE — 74011250637 HC RX REV CODE- 250/637: Performed by: HOSPITALIST

## 2019-05-07 PROCEDURE — 97530 THERAPEUTIC ACTIVITIES: CPT

## 2019-05-07 PROCEDURE — 65270000029 HC RM PRIVATE

## 2019-05-07 PROCEDURE — 97112 NEUROMUSCULAR REEDUCATION: CPT

## 2019-05-07 RX ADMIN — LISINOPRIL 40 MG: 20 TABLET ORAL at 09:17

## 2019-05-07 RX ADMIN — Medication 10 ML: at 05:10

## 2019-05-07 RX ADMIN — Medication 100 MG: at 09:17

## 2019-05-07 RX ADMIN — OXYCODONE HYDROCHLORIDE 20 MG: 5 TABLET ORAL at 18:56

## 2019-05-07 RX ADMIN — SODIUM CHLORIDE 50 MG: 900 INJECTION, SOLUTION INTRAVENOUS at 05:10

## 2019-05-07 RX ADMIN — FOLIC ACID 1 MG: 1 TABLET ORAL at 09:17

## 2019-05-07 RX ADMIN — ACETAMINOPHEN 650 MG: 325 TABLET ORAL at 21:43

## 2019-05-07 RX ADMIN — ENOXAPARIN SODIUM 40 MG: 40 INJECTION SUBCUTANEOUS at 09:16

## 2019-05-07 RX ADMIN — GABAPENTIN 400 MG: 100 CAPSULE ORAL at 09:17

## 2019-05-07 RX ADMIN — LEVOTHYROXINE SODIUM 88 MCG: 88 TABLET ORAL at 05:10

## 2019-05-07 RX ADMIN — TAMSULOSIN HYDROCHLORIDE 0.4 MG: 0.4 CAPSULE ORAL at 09:17

## 2019-05-07 RX ADMIN — OXYCODONE HYDROCHLORIDE 20 MG: 5 TABLET ORAL at 05:52

## 2019-05-07 RX ADMIN — Medication 10 ML: at 21:17

## 2019-05-07 RX ADMIN — SERTRALINE HYDROCHLORIDE 50 MG: 50 TABLET ORAL at 09:17

## 2019-05-07 RX ADMIN — ACETAMINOPHEN 650 MG: 325 TABLET ORAL at 04:02

## 2019-05-07 RX ADMIN — GABAPENTIN 400 MG: 100 CAPSULE ORAL at 17:49

## 2019-05-07 RX ADMIN — IBUPROFEN 200 MG: 200 TABLET, FILM COATED ORAL at 21:17

## 2019-05-07 RX ADMIN — OXYCODONE HYDROCHLORIDE 20 MG: 5 TABLET ORAL at 12:52

## 2019-05-07 RX ADMIN — PANTOPRAZOLE SODIUM 40 MG: 40 TABLET, DELAYED RELEASE ORAL at 05:10

## 2019-05-07 RX ADMIN — SODIUM CHLORIDE 50 MG: 900 INJECTION, SOLUTION INTRAVENOUS at 17:50

## 2019-05-07 RX ADMIN — Medication 1 CAPSULE: at 09:17

## 2019-05-07 RX ADMIN — AMLODIPINE BESYLATE 10 MG: 5 TABLET ORAL at 09:17

## 2019-05-07 RX ADMIN — IBUPROFEN 200 MG: 200 TABLET, FILM COATED ORAL at 16:54

## 2019-05-07 RX ADMIN — Medication 10 ML: at 16:46

## 2019-05-07 RX ADMIN — CEFTRIAXONE SODIUM 2 G: 2 INJECTION, POWDER, FOR SOLUTION INTRAMUSCULAR; INTRAVENOUS at 16:45

## 2019-05-07 RX ADMIN — METHADONE HYDROCHLORIDE 130 MG: 10 CONCENTRATE ORAL at 09:17

## 2019-05-07 RX ADMIN — ALLOPURINOL 100 MG: 100 TABLET ORAL at 09:17

## 2019-05-07 RX ADMIN — THERA TABS 1 TABLET: TAB at 09:17

## 2019-05-07 RX ADMIN — TRAZODONE HYDROCHLORIDE 50 MG: 50 TABLET ORAL at 21:17

## 2019-05-07 RX ADMIN — IBUPROFEN 200 MG: 200 TABLET, FILM COATED ORAL at 09:17

## 2019-05-07 NOTE — ADT AUTH CERT NOTES
5/6/2019 15:06:40 EDT by Marek Perez    Blood pressure 139/75, pulse 85, temperature 97.3 °F (36.3 °C), resp. rate 18, O2 sat 97 %       ( ) * No infection, or status acceptable    (X) * Pain and nausea absent or adequately managed    ( ) * Wound and ulcer problems absent or status acceptable    (X) * No burn injury, or status acceptable    (X) * Traumatic injury absent or status appropriate    ( ) * Skin disease absent or status appropriate    ( ) * General Discharge Criteria met       Interventions   (X) * Intake acceptable    ( ) * No inpatient interventions needed    5/6/2019 15:06:40 EDT by Marek Perez    meds: IV rocephin q24hrs, IV tygacil q12hrs, IV Zofran 4mg x1, po oxycodone IR x3,                   * Milestone      Additional Notes   5/5/19 clinical:    The [address] [place] [address] Podiatric Surgery   Progress Note   Subjective:   Devere Barb: following for left lower extremity ulcerations with cellulitis. Afebrile. Pain control improving. Musculoskeletal: positive for weakness and pain   Skin: positive for wounds   Neurological: Negative for dizziness. Positive for weakness       Psychiatric/Behavioral: positive for depression      Skin Temp: warm to warm right and left lower extremities legs to toes   Extremity Edema: +1 pitting with chronic hemosiderin deposition      Neurological:   Deep Tendon Reflexes of Achilles and Patellar: diminished but intact right. Diminished left   Epicritic and Protective Sensations: absent      Deep Pain Response: present      Dermatological:   Toenails: mycotic 1-5 right and left foot   Ulceration:   Ulceration medial left heel. Measures 4cm x 3cm x 0.1 with increasing granulation tissue. No erythema or purulence. Decreasing drainage       Dorsal foot ulceration    Measures 1.4cm x 1.7cm x 0.1 with increasing granulation tissue       Less pain noted during dressing changes.          Impression   1.    Ulcerations x2 left lower extremity   2.    Venous insufficiency.       Plan: -. Reviewed pathology. No definitive diagnosis. Venous insufficiency most likely given patient compliance issues with previous management. I have recommended a trial of unna boot compression therapy which will be changed weekly in my office. Explained that follow up and treatment consistency is critical for wound healing and limb salvage. Antibiotics per ID.        - Unna boot applied to left lower extremity.

## 2019-05-07 NOTE — PROGRESS NOTES
OCCUPATIONAL THERAPY TREATMENT  Patient: Duong Carter (7 y.o. male)  Date: 5/7/2019  Diagnosis: Foot infection [L08.9] Left foot infection  Procedure(s) (LRB):  IRRIGATION AND DEBRIDEMENT LEFT FOOT, BIOPSY LEFT FOOT (POP/SAPH BLOCK) (Left) 12 Days Post-Op  Precautions: Fall  Chart, occupational therapy assessment, plan of care, and goals were reviewed. ASSESSMENT:  Pt received seated on EOB. Pt seen in OT to provide pt with larger tubi  for better joint integrity of L hand and prevent contractures. Pt receptive with trying larger tubi . OT cut two blue tubi  in 1/2 and taped together to open hand to facilitate stretching. Pt was having a hard time with placing tubi  in his hand stating that his hand was too tight because he needed to relax. OT asked if he wanted help with stretching his hand to help place tubi . Pt declined OT and stated he could do it on his own. Pt also wanted his sheets changed on bed. OT started re-arranging furniture to provide pt with armed chair (vs chair with no arms) so nursing could change linens. Pt became agitated and demanded everything be moved back to where it was. He was tired of everyone moving his furniture. Per PT/OT notes in the past week, pt has not wanted therapy and participation has been minimal. Pt has been resistant to any recommendations that would keep him safe and healthy. Feel pt is not benefiting from OT services at this time and will complete OT order. Progression toward goals:  ?       Improving appropriately and progressing toward goals  ? Improving slowly and progressing toward goals  ? Not making progress toward goals and plan of care will be adjusted     PLAN:  Patient continues to benefit from skilled intervention to address the above impairments. Continue treatment per established plan of care.   Discharge Recommendations:  None  Further Equipment Recommendations for Discharge:  none noted      SUBJECTIVE: Patient stated ? Stop moving my furniture around! .?    OBJECTIVE DATA SUMMARY:   Cognitive/Behavioral Status:  Neurologic State: Alert  Orientation Level: Oriented X4  Cognition: Follows commands             Functional Mobility and Transfers for ADLs:  Bed Mobility:       Transfers:             Balance:       ADL Intervention:              Neuro Re-Education:      See under assessment     Pain:  Pain Scale 1: Visual  Pain Intensity 1: 6           Pain Intervention(s) 1: Medication (see MAR)  Activity Tolerance:     Please refer to the flowsheet for vital signs taken during this treatment. After treatment:   ? Patient left in no apparent distress sitting up in chair  ? Patient left in no apparent distress in bed  ? Call bell left within reach  ? Nursing notified  ? Caregiver present  ?  Bed alarm activated    COMMUNICATION/COLLABORATION:   The patient?s plan of care was discussed with: Physical Therapist and Registered Nurse    ULYSSES Bermudez/L  Time Calculation: 15 mins

## 2019-05-07 NOTE — PROGRESS NOTES
Problem: Mobility Impaired (Adult and Pediatric)  Goal: *Acute Goals and Plan of Care (Insert Text)  Description  Physical Therapy Goals  Initiated 4/30/2019, Reviewed 5/7/19 and all met except #5. 1.  Patient will move from supine to sit and sit to supine  in bed with modified independence within 7 day(s). 2.  Patient will transfer from bed to chair and chair to bed with modified independence using the least restrictive device within 7 day(s). 3.  Patient will perform sit to stand with modified independence within 7 day(s). 4.  Patient will ambulate with modified independence for 150 feet with the least restrictive device within 7 day(s). 5.  Patient will ascend/descend 2 stairs with 1 handrail(s) with modified independence within 7 day(s). Note:   PHYSICAL THERAPY TREATMENT: WEEKLY REASSESSMENT  Patient: Leelee Rey (37 y.o. male)  Date: 5/7/2019  Diagnosis: Foot infection [L08.9] Left foot infection  Procedure(s) (LRB):  IRRIGATION AND DEBRIDEMENT LEFT FOOT, BIOPSY LEFT FOOT (POP/SAPH BLOCK) (Left) 12 Days Post-Op  Precautions: (left foot wound infection - non healing ulcers)  Chart, physical therapy assessment, plan of care and goals were reviewed. ASSESSMENT:  Patient reluctant to accept recommendations from PT or get OOB for PT. He reports he was told to stay off his left foot as much as possible per MD and refuses to wear post op shoe, any footwear whatsoever. He states he has been getting up to bathroom several times daily, and sometimes the nurses will watch him amb. He refused gait belt, alternative assistive devices, or footwear. He demonstrated MOD I to ambulate to bathroom with SPC at slow pace although he required several attempts to come to standing from already elevated bed height. No loss of balance detected. He stood at sink and performed ADLs ie tooth brushing, washing face and combing hair after wetting his head down. He leaned against sink for balance.  He did not demonstrate unsteadiness or loss of balance. Perseverating again today on something to place in left hand. Relayed to OT. Gave him blue roll. Patient is adamant against accepting therapy advice and recommendations. He has his compensatory ways for functioning with left hemiparetic side and overall he is functional with SPC. He is only going to get OOB to walk to/from bathroom and only on his scheduled. Recommend discontinue PT services at this time. Patient's progression toward goals since last assessment: progressive wound healing and IV antibiotics     PLAN:  Goals have been met with exception of stair climbing. Recommend discontinue PT services. Discharge Recommendations: None  Further Equipment Recommendations for Discharge: none     SUBJECTIVE:   Patient stated ? I dont know why they keep sending you all in here. ?    OBJECTIVE DATA SUMMARY:   Critical Behavior:  Neurologic State: Alert  Orientation Level: Oriented X4  Cognition: Follows commands  Safety/Judgement: Awareness of environment, Fall prevention, Decreased insight into deficits, Decreased awareness of need for safety    Strength:   Left hemiparesis with increased tone throughout LUE and LLE  RUE and RLE - WFLs                         Functional Mobility Training:  Bed Mobility:  Rolling: Modified independent  Supine to Sit: Modified independent  Sit to Supine: Modified independent  Scooting: Modified independent        Transfers:  Sit to Stand: Modified independent  Stand to Sit: Modified independent  Stand Pivot Transfers: Modified independent          Balance:  Standing: With support  Standing - Static: Fair  Standing - Dynamic : Fair  Ambulation/Gait Training:  Distance (ft): 30 Feet (ft)  Assistive Device: Cane, straight;Gait belt  Ambulation - Level of Assistance: Modified independent        Gait Abnormalities: Antalgic;Hemiplegic        Base of Support: Center of gravity altered;Shift to right     Speed/Reva: Fluctuations  Step Length: Left shortened;Right shortened  Swing Pattern: Left asymmetrical       Stairs - Level of Assistance: (NT)       Pain:  Pain Scale 1: Visual  Pain Intensity 1: 6           Pain Intervention(s) 1: Medication (see MAR)  Activity Tolerance:   NT  Please refer to the flowsheet for vital signs taken during this treatment. After treatment:   ?  Patient left in no apparent distress sitting up in chair  ? Patient left in no apparent distress in bed  ? Call bell left within reach  ? Nursing notified  ? Caregiver present  ?   Bed alarm activated    COMMUNICATION/COLLABORATION:   The patient?s plan of care was discussed with: Physical Therapy Assistant, Occupational Therapist and Registered Nurse    Savage Ferraro PT, DPT   Time Calculation: 17 mins

## 2019-05-07 NOTE — PROGRESS NOTES
[facility] Progress Note           Janey Perrin DO    [phone] Office  [phone]  Fax    2019      Assessment & Plan:   1. Chronic left lower extremity wounds. Unable to tolerate MRI. CT negative for OM. S/p I&D by podiatry. No cultures sent. Biopsy inconclusive. Previous cultures growing SMALT, enterococcus and providencia. Patient allergic to sulfa. Organism is intermediate to ceftaz and cipro. Sensitive to tigecycline. Tigecycline has poor activity against providencia so added ceftriaxone. Anticipate patient will remain in hospital to complete abx through 5/10/19  2. Multiple antibiotic allergies include trimethoprim-sulfamethoxazole, ciprofloxacin, and vancomycin. 3.  History of hemorrhagic stroke with left-sided hemiparesis. Subjective: Tolerating abx. No complaints    Objective:     Vitals:   Visit Vitals  /69 (BP 1 Location: Left arm, BP Patient Position: Sitting)   Pulse 63   Temp 97.8 °F (36.6 °C)   Resp 20   Ht 6' 1\" (1.854 m)   Wt 108.9 kg (240 lb)   SpO2 97%   BMI 31.66 kg/m²        Tmax:  Temp (24hrs), Av.7 °F (36.5 °C), Min:97.4 °F (36.3 °C), Max:97.8 °F (36.6 °C)      Exam:   Patient is intubated:  no    Physical Examination:   General:  NAD   Head:     Eyes:     Neck:        Lungs:   No distress   Chest wall:     Heart:     Abdomen:   non-distended   Extremities:    Skin: LLE dressed   Neurologic:      Labs:        No lab exists for component: ITNL   No results for input(s): CPK, CKMB, TROIQ in the last 72 hours. No results for input(s): NA, K, CL, CO2, BUN, CREA, GLU, PHOS, MG, ALB, WBC, HGB, HCT, PLT, HGBEXT, HCTEXT, PLTEXT, HGBEXT, HCTEXT, PLTEXT in the last 72 hours. No lab exists for component:  CA  No results for input(s): INR, PTP, APTT in the last 72 hours.     No lab exists for component: INREXT, INREXT  Needs: urine analysis, urine sodium, protein and creatinine  No results found for: VIVEK, CREAU      Cultures:     Lab Results   Component Value Date/Time    Specimen Description: BLOOD 02/04/2010 10:55 PM    Specimen Description: BLOOD 02/12/2009 11:30 PM     Lab Results   Component Value Date/Time    Culture result: NO GROWTH 6 DAYS 04/23/2019 09:11 PM    Culture result: HEAVY ENTEROCOCCUS FAECALIS GROUP D (A) 04/23/2019 09:11 PM    Culture result: HEAVY PROVIDENCIA RETTGERI (A) 04/23/2019 09:11 PM    Culture result: (A) 04/23/2019 09:11 PM     HEAVY STENOTROPHOMONAS (Singh Puja.) MALTOPHILIA CLSI GUIDELINES DO NOT RECOMMEND REPORTING SUSCEPTIBILITIES FOR S. MALTOPHILIA. TRIMETHOPRIM/SULFAMETHOXAZOLE IS THE DRUG OF CHOICE.     Culture result: HEAVY DIPHTHEROIDS (A) 04/23/2019 09:11 PM       Radiology:     Medications       Current Facility-Administered Medications   Medication Dose Route Frequency Last Dose    ibuprofen (MOTRIN) tablet 200 mg  200 mg Oral  mg at 05/07/19 0917    oxyCODONE IR (ROXICODONE) tablet 20 mg  20 mg Oral Q6H PRN 20 mg at 05/07/19 0552    gabapentin (NEURONTIN) capsule 400 mg  400 mg Oral  mg at 05/07/19 0917    lisinopril (PRINIVIL, ZESTRIL) tablet 40 mg  40 mg Oral DAILY 40 mg at 50/41/79 2573    folic acid (FOLVITE) tablet 1 mg  1 mg Oral DAILY 1 mg at 05/07/19 0917    thiamine mononitrate (B-1) tablet 100 mg  100 mg Oral DAILY 100 mg at 05/07/19 0917    therapeutic multivitamin (THERAGRAN) tablet 1 Tab  1 Tab Oral DAILY 1 Tab at 05/07/19 0917    nicotine (NICORETTE) gum 4 mg  4 mg Oral Q2H PRN      ondansetron (ZOFRAN) injection 4 mg  4 mg IntraVENous Q6H PRN 4 mg at 05/06/19 1524    pantoprazole (PROTONIX) tablet 40 mg  40 mg Oral ACB 40 mg at 05/07/19 0510    amLODIPine (NORVASC) tablet 10 mg  10 mg Oral DAILY 10 mg at 05/07/19 0917    TIGEcycline (TYGACIL) 50 mg in 0.9% sodium chloride (MBP/ADV) 100 mL  50 mg IntraVENous Q12H 50 mg at 05/07/19 0510    cefTRIAXone (ROCEPHIN) 2 g in 0.9% sodium chloride (MBP/ADV) 50 mL  2 g IntraVENous Q24H 2 g at 05/06/19 1520    sertraline (ZOLOFT) tablet 50 mg  50 mg Oral DAILY 50 mg at 05/07/19 0917    traZODone (DESYREL) tablet 50 mg  50 mg Oral QHS 50 mg at 05/06/19 2136    hydrALAZINE (APRESOLINE) 20 mg/mL injection 10 mg  10 mg IntraVENous Q6H PRN      lactobac ac& pc-s.therm-b.anim (YULIA Q/RISAQUAD)  1 Cap Oral DAILY 1 Cap at 05/07/19 0917    allopurinol (ZYLOPRIM) tablet 100 mg  100 mg Oral DAILY 100 mg at 05/07/19 0917    levothyroxine (SYNTHROID) tablet 88 mcg  88 mcg Oral ACB 88 mcg at 05/07/19 0510    melatonin tablet 3 mg  3 mg Oral QHS PRN 3 mg at 05/06/19 2346    tamsulosin (FLOMAX) capsule 0.4 mg  0.4 mg Oral DAILY 0.4 mg at 05/07/19 0917    methadone (DOLOPHINE) 10 mg/mL concentrated solution 130 mg  130 mg Oral DAILY 130 mg at 05/07/19 0917    sodium chloride (NS) flush 5-40 mL  5-40 mL IntraVENous Q8H 10 mL at 05/07/19 0510    sodium chloride (NS) flush 5-40 mL  5-40 mL IntraVENous PRN 10 mL at 05/06/19 1524    acetaminophen (TYLENOL) tablet 650 mg  650 mg Oral Q4H  mg at 05/07/19 0402    naloxone (NARCAN) injection 0.4 mg  0.4 mg IntraVENous PRN      enoxaparin (LOVENOX) injection 40 mg  40 mg SubCUTAneous Q24H 40 mg at 05/07/19 2099           Case discussed with:       Ravindra Sanderson DO

## 2019-05-07 NOTE — PROGRESS NOTES
Bedside shift change report given to Lisbet Cavazos (oncoming nurse) by Shruti Craven RN (offgoing nurse). Report included the following information SBAR, MAR, Accordion, Recent Results and Med Rec Status.

## 2019-05-07 NOTE — PROGRESS NOTES
[facility]    Medical Progress Note      NAME: Melody Ludwig   :  1965  MRM:  [id number]    Date/Time: 2019  8:58 AM          Assessment and Plan:     Left foot infection - POA, I&D of ulcer with Bx by podiatry on . CT showed no drainable abscess or e/o osteomyelitis. Wound culture polymicrobial, including enterococcus, Providencia, and Stenotrophomonas. ID consulted and are giving IV tigecycline and CTX till 5/10/19. Lack of insurance coverage to do this outside hospital. OP vascular f/u with wound care and DEVEN  Boots recommended by podiatry      HTN - Stable on lisinopril and Norvasc. IV hydralazine PRN     Chronic pain syndrome / Depression - Continue methadone (confirmed by pharmacy) and now gabapentin and oxycodone (increased temporarily due to acute pain but patient informed no pain meds will be given at discharge). Appreciate psychiatry consult. We have started sertraline and prn QHS trazodone.     ETOH abuse - per report. Pt denies. No signs of withdrawal. Goody vitamins      Gout - No symptoms on allopurinol.     Malignant neoplasm of urinary bladder - s/p surgery in 2018 at [facility]. Cr stable. Has missed his outpatient followup and he requests Urology to see him here. Continue flomax     Hypothyroidism - TSH markedly elevated presumed due to non compliance. Continue Synthroid at current dose for now, but repeat TFTs in 4 weeks     Hx of hemorrhagic stroke with left sided hemiparesis / Physical debility - Uses wheelchair and walker at baseline. PT/OT consulted. Continue supportive care     Chronic obstructive pulmonary disease - Stable. PRN nebs    Tobacco abuse - nicoderm         Subjective:     Chief Complaint:  Pain stable. No events overnight and no complaints this AM    ROS:  (bold if positive, if negative)    Tolerating some PT  Tolerating Diet        Objective:     Last 24hrs VS reviewed since prior progress note.  Most recent are:    Visit Vitals  BP 149/69 (BP 1 Location: Left arm, BP Patient Position: Sitting)   Pulse 63   Temp 97.8 °F (36.6 °C)   Resp 20   Ht 6' 1\" (1.854 m)   Wt 108.9 kg (240 lb)   SpO2 97%   BMI 31.66 kg/m²     SpO2 Readings from Last 6 Encounters:   05/07/19 97%   10/12/18 96%   09/28/18 95%   08/03/18 100%   08/01/18 95%   06/08/18 97%    O2 Flow Rate (L/min): 8 l/min       Intake/Output Summary (Last 24 hours) at 5/7/2019 0858  Last data filed at 5/7/2019 0515  Gross per 24 hour   Intake 3990 ml   Output 2525 ml   Net 1465 ml        Physical Exam:    Gen:  Obese, in no acute distress  HEENT:  Pink conjunctivae, PERRL, hearing intact to voice, moist mucous membranes  Neck:  Supple, without masses, thyroid non-tender  Resp:  No accessory muscle use, clear breath sounds without wheezes rales or rhonchi  Card:  No murmurs, normal S1, S2 without thrills, bruits or peripheral edema  Abd:  Soft, non-tender, non-distended, normoactive bowel sounds are present, no mass  Lymph:  No cervical or inguinal adenopathy  Musc:  No cyanosis or clubbing  Skin:  LLE in dressing CDI, skin turgor is good  Neuro:  Cranial nerves are grossly intact, general motor weakness, follows commands   Psych:  Poor insight, oriented to person, place and time, alert    Telemetry reviewed:   normal sinus rhythm  __________________________________________________________________  Medications Reviewed: (see below)  Medications:     Current Facility-Administered Medications   Medication Dose Route Frequency    ibuprofen (MOTRIN) tablet 200 mg  200 mg Oral TID    oxyCODONE IR (ROXICODONE) tablet 20 mg  20 mg Oral Q6H PRN    gabapentin (NEURONTIN) capsule 400 mg  400 mg Oral BID    lisinopril (PRINIVIL, ZESTRIL) tablet 40 mg  40 mg Oral DAILY    folic acid (FOLVITE) tablet 1 mg  1 mg Oral DAILY    thiamine mononitrate (B-1) tablet 100 mg  100 mg Oral DAILY    therapeutic multivitamin (THERAGRAN) tablet 1 Tab  1 Tab Oral DAILY    nicotine (NICORETTE) gum 4 mg  4 mg Oral Q2H PRN    ondansetron (ZOFRAN) injection 4 mg  4 mg IntraVENous Q6H PRN    pantoprazole (PROTONIX) tablet 40 mg  40 mg Oral ACB    amLODIPine (NORVASC) tablet 10 mg  10 mg Oral DAILY    TIGEcycline (TYGACIL) 50 mg in 0.9% sodium chloride (MBP/ADV) 100 mL  50 mg IntraVENous Q12H    cefTRIAXone (ROCEPHIN) 2 g in 0.9% sodium chloride (MBP/ADV) 50 mL  2 g IntraVENous Q24H    sertraline (ZOLOFT) tablet 50 mg  50 mg Oral DAILY    traZODone (DESYREL) tablet 50 mg  50 mg Oral QHS    hydrALAZINE (APRESOLINE) 20 mg/mL injection 10 mg  10 mg IntraVENous Q6H PRN    lactobac ac& pc-s.therm-b.anim (YULIA Q/RISAQUAD)  1 Cap Oral DAILY    allopurinol (ZYLOPRIM) tablet 100 mg  100 mg Oral DAILY    levothyroxine (SYNTHROID) tablet 88 mcg  88 mcg Oral ACB    melatonin tablet 3 mg  3 mg Oral QHS PRN    tamsulosin (FLOMAX) capsule 0.4 mg  0.4 mg Oral DAILY    methadone (DOLOPHINE) 10 mg/mL concentrated solution 130 mg  130 mg Oral DAILY    sodium chloride (NS) flush 5-40 mL  5-40 mL IntraVENous Q8H    sodium chloride (NS) flush 5-40 mL  5-40 mL IntraVENous PRN    acetaminophen (TYLENOL) tablet 650 mg  650 mg Oral Q4H PRN    naloxone (NARCAN) injection 0.4 mg  0.4 mg IntraVENous PRN    enoxaparin (LOVENOX) injection 40 mg  40 mg SubCUTAneous Q24H        Lab Data Reviewed: (see below)  Lab Review:     No results for input(s): WBC, HGB, HCT, PLT, HGBEXT, HCTEXT, PLTEXT, HGBEXT, HCTEXT, PLTEXT in the last 72 hours. No results for input(s): NA, K, CL, CO2, GLU, BUN, CREA, CA, MG, PHOS, ALB, TBIL, TBILI, SGOT, ALT, INR in the last 72 hours. No lab exists for component: INREXT, INREXT  Lab Results   Component Value Date/Time    Glucose (POC) 150 (H) 03/19/2018 11:45 AM    Glucose (POC) 123 (H) 03/18/2018 08:55 PM    Glucose (POC) 96 03/16/2018 04:58 PM    Glucose (POC) 116 (H) 12/12/2012 12:42 PM    Glucose (POC) 99 12/11/2012 11:27 PM     No results for input(s): PH, PCO2, PO2, HCO3, FIO2 in the last 72 hours.   No results for input(s): INR in the last 72 hours. No lab exists for component: Abby Duty  All Micro Results     Procedure Component Value Units Date/Time    CULTURE, BLOOD [[id number]] Collected:  04/23/19 2111    Order Status:  Completed Specimen:  Blood Updated:  04/29/19 0618     Special Requests: NO SPECIAL REQUESTS        Culture result: NO GROWTH 6 DAYS       CULTURE, WOUND Elda Cordial STAIN [[id number]]  (Abnormal)  (Susceptibility) Collected:  04/23/19 2111    Order Status:  Completed Specimen:  Wound from Foot Updated:  04/26/19 1326     Special Requests: NO SPECIAL REQUESTS        GRAM STAIN RARE WBCS SEEN         2+ GRAM NEGATIVE RODS               OCCASIONAL GRAM POSITIVE COCCI IN CLUSTERS            RARE GRAM POSITIVE RODS        Culture result:       HEAVY ENTEROCOCCUS FAECALIS GROUP D                  HEAVY PROVIDENCIA RETTGERI                  HEAVY STENOTROPHOMONAS (Diego Dura.) MALTOPHILIA CLSI GUIDELINES DO NOT RECOMMEND REPORTING SUSCEPTIBILITIES FOR S. MALTOPHILIA. TRIMETHOPRIM/SULFAMETHOXAZOLE IS THE DRUG OF CHOICE. HEAVY DIPHTHEROIDS       CULTURE, MRSA [[id number]] Collected:  04/23/19 2315    Order Status:  Canceled Specimen:  Nares     CULTURE, WOUND [[id number]] Collected:  04/23/19 2300    Order Status:  Canceled Specimen:  Wound from Foot           Other pertinent lab: none    Total time spent with patient: 25 Minutes I reviewed chart, notes, data and current medications in the medical record. I have examined and treated the patient at bedside during this period.                  Care Plan discussed with: Patient, Care Manager, Nursing Staff and >50% of time spent in counseling and coordination of care    Discussed:  Care Plan    Prophylaxis:  H2B/PPI    Disposition:  Home w/Family           ___________________________________________________    Attending Physician: Wanda Clayton MD

## 2019-05-07 NOTE — PROGRESS NOTES
Bedside and Verbal shift change report given to Yolanda Humphries (oncoming nurse) by Harris Gonzalez (offgoing nurse). Report included the following information SBAR and Kardex.

## 2019-05-07 NOTE — PROGRESS NOTES
Nutrition Assessment:    INTERVENTIONS/RECOMMENDATIONS:   Meals/Snacks: General/healthful diet:  Continue regular, liberalized diet for optimal nutritional intake. Supplements: Commercial supplement:  Continue Ensure High protein once daily in combination with Ten once daily. ASSESSMENT:   5/7:  Chart reviewed; med noted for left foot infection, s/p I&D of ulcer by podiatry. Significant PMH includes ETOH abuse, HTN, chronic pain/depression. Visited pt at bedside; he was drowsy during visit and frequently nodding off. He was able to indicate good po intake and consumption of oral nutrition supplements. Pt is currently receiving IV antibiotic tx at least until 5/10. No new nutrition needs identified at this time. Diet Order: Regular  % Eaten:    Patient Vitals for the past 72 hrs:   % Diet Eaten   05/05/19 1615 75 %   05/05/19 0830 100 %   05/04/19 1533 100 %     Pertinent Medications: [x] Reviewed []Other: lovenox, MVI, protonix, thiamine  Pertinent Labs: [x]Reviewed  []Other:  Food Allergies: [x]None []Other:     Last BM: 5/6   [x]Active     []Hyperactive  []Hypoactive       [] Absent  BS  Skin:    [] Intact   [x] Incision: I&D left foot 2' wound infection  [] Breakdown   []Edema   []Other:    Anthropometrics: Height: 6' 1\" (185.4 cm) Weight: 108.9 kg (240 lb)    IBW (%IBW):   ( ) UBW (%UBW):   (  %)    BMI: Body mass index is 31.66 kg/m². This BMI is indicative of:  []Underweight   []Normal   [x]Overweight   [] Obesity   [] Extreme Obesity (BMI>40)  Last Weight Metrics:  Weight Loss Metrics 4/25/2019 9/28/2018 9/28/2018 9/5/2018 8/8/2018 7/31/2018 6/5/2018   Today's Wt 240 lb 228 lb 228 lb 223 lb 223 lb 230 lb 225 lb 8.5 oz   BMI 31.66 kg/m2 30.08 kg/m2 30.08 kg/m2 29.42 kg/m2 29.42 kg/m2 30.34 kg/m2 29.76 kg/m2       Estimated Nutrition Needs (Based on): 7472 (BMR (1980) x 1. 2AF) , 81 g(-109g/day(0.8-1.0g/kg)) Protein  Carbohydrate:  At Least 130 g/day  Fluids:  mL/day    NUTRITION DIAGNOSES: Problem:  Not ready for diet/lifestyle change      Etiology: related to unwilling in learning applying information     Signs/Symptoms: as evidenced by reports of excessive etoh intakes, w/d, agitated    Previous Nutrition Dx:  [] Resolved  [] Unresolved           [x] Progressing    NUTRITION INTERVENTIONS:  Meals/Snacks: General/healthful diet   Supplements: Commercial supplement              GOAL:   Pt will continue to consume >50% of meals + 240 ml Ensure shake next 5-7 days    NUTRITION MONITORING AND EVALUATION   Behavioral-Environmental Outcomes: Readiness to change, Food/nutrition knowledge, Behavior  Food/Nutrient Intake Outcomes:  Total energy intake  Physical Signs/Symptoms Outcomes: Weight/weight change    Previous Goal Met:   [x] Met              [] Progressing Towards Goal              [] Not Progressing Towards Goal   Previous Recommendations:   [x] Implemented          [] Not Implemented          [] Not Applicable    LEARNING NEEDS (Diet, Food/Nutrient-Drug Interaction):    [x] None Identified   [] Identified and Education Provided/Documented   [] Identified and Pt declined/was not appropriate     Cultural, Moravian, OR Ethnic Dietary Needs:    [x] None Identified   [] Identified and Addressed     [x] Interdisciplinary Care Plan Reviewed/Documented    [x] Discharge Planning:  Continue regular diet; may also benefit from continued Ensure HP or equivalent post discharge x 30 days   [] Participated in Interdisciplinary Rounds    NUTRITION RISK:    [x] Patient At Nutritional Risk   [] Patient Not At [address] [place] [address]  Pager [phone]  Weekend Pager [phone]

## 2019-05-08 PROCEDURE — 74011250636 HC RX REV CODE- 250/636: Performed by: PODIATRIST

## 2019-05-08 PROCEDURE — 74011250637 HC RX REV CODE- 250/637: Performed by: PODIATRIST

## 2019-05-08 PROCEDURE — 74011000258 HC RX REV CODE- 258: Performed by: INTERNAL MEDICINE

## 2019-05-08 PROCEDURE — 94760 N-INVAS EAR/PLS OXIMETRY 1: CPT

## 2019-05-08 PROCEDURE — 74011250637 HC RX REV CODE- 250/637: Performed by: INTERNAL MEDICINE

## 2019-05-08 PROCEDURE — 74011250636 HC RX REV CODE- 250/636: Performed by: INTERNAL MEDICINE

## 2019-05-08 PROCEDURE — 65270000029 HC RM PRIVATE

## 2019-05-08 PROCEDURE — 74011250637 HC RX REV CODE- 250/637: Performed by: HOSPITALIST

## 2019-05-08 PROCEDURE — 74011250637 HC RX REV CODE- 250/637: Performed by: NURSE PRACTITIONER

## 2019-05-08 RX ADMIN — ACETAMINOPHEN 650 MG: 325 TABLET ORAL at 03:58

## 2019-05-08 RX ADMIN — Medication 10 ML: at 06:13

## 2019-05-08 RX ADMIN — OXYCODONE HYDROCHLORIDE 20 MG: 5 TABLET ORAL at 12:28

## 2019-05-08 RX ADMIN — TAMSULOSIN HYDROCHLORIDE 0.4 MG: 0.4 CAPSULE ORAL at 08:16

## 2019-05-08 RX ADMIN — Medication 100 MG: at 08:16

## 2019-05-08 RX ADMIN — IBUPROFEN 200 MG: 200 TABLET, FILM COATED ORAL at 16:28

## 2019-05-08 RX ADMIN — Medication 10 ML: at 16:29

## 2019-05-08 RX ADMIN — IBUPROFEN 200 MG: 200 TABLET, FILM COATED ORAL at 21:34

## 2019-05-08 RX ADMIN — Medication 1 CAPSULE: at 08:16

## 2019-05-08 RX ADMIN — IBUPROFEN 200 MG: 200 TABLET, FILM COATED ORAL at 08:16

## 2019-05-08 RX ADMIN — OXYCODONE HYDROCHLORIDE 20 MG: 5 TABLET ORAL at 00:27

## 2019-05-08 RX ADMIN — FOLIC ACID 1 MG: 1 TABLET ORAL at 08:16

## 2019-05-08 RX ADMIN — SODIUM CHLORIDE 50 MG: 900 INJECTION, SOLUTION INTRAVENOUS at 04:00

## 2019-05-08 RX ADMIN — THERA TABS 1 TABLET: TAB at 08:16

## 2019-05-08 RX ADMIN — GABAPENTIN 400 MG: 100 CAPSULE ORAL at 08:16

## 2019-05-08 RX ADMIN — LISINOPRIL 40 MG: 20 TABLET ORAL at 08:16

## 2019-05-08 RX ADMIN — GABAPENTIN 400 MG: 100 CAPSULE ORAL at 17:48

## 2019-05-08 RX ADMIN — LEVOTHYROXINE SODIUM 88 MCG: 88 TABLET ORAL at 06:13

## 2019-05-08 RX ADMIN — OXYCODONE HYDROCHLORIDE 20 MG: 5 TABLET ORAL at 06:13

## 2019-05-08 RX ADMIN — SERTRALINE HYDROCHLORIDE 50 MG: 50 TABLET ORAL at 08:16

## 2019-05-08 RX ADMIN — TRAZODONE HYDROCHLORIDE 50 MG: 50 TABLET ORAL at 21:34

## 2019-05-08 RX ADMIN — AMLODIPINE BESYLATE 10 MG: 5 TABLET ORAL at 08:16

## 2019-05-08 RX ADMIN — PANTOPRAZOLE SODIUM 40 MG: 40 TABLET, DELAYED RELEASE ORAL at 06:13

## 2019-05-08 RX ADMIN — ACETAMINOPHEN 650 MG: 325 TABLET ORAL at 23:29

## 2019-05-08 RX ADMIN — ALLOPURINOL 100 MG: 100 TABLET ORAL at 08:16

## 2019-05-08 RX ADMIN — ONDANSETRON 4 MG: 2 INJECTION INTRAMUSCULAR; INTRAVENOUS at 10:46

## 2019-05-08 RX ADMIN — SODIUM CHLORIDE 50 MG: 900 INJECTION, SOLUTION INTRAVENOUS at 17:13

## 2019-05-08 RX ADMIN — Medication 10 ML: at 21:35

## 2019-05-08 RX ADMIN — METHADONE HYDROCHLORIDE 130 MG: 10 CONCENTRATE ORAL at 08:15

## 2019-05-08 RX ADMIN — OXYCODONE HYDROCHLORIDE 20 MG: 5 TABLET ORAL at 18:43

## 2019-05-08 RX ADMIN — ENOXAPARIN SODIUM 40 MG: 40 INJECTION SUBCUTANEOUS at 08:15

## 2019-05-08 RX ADMIN — ONDANSETRON 4 MG: 2 INJECTION INTRAMUSCULAR; INTRAVENOUS at 19:56

## 2019-05-08 RX ADMIN — CEFTRIAXONE SODIUM 2 G: 2 INJECTION, POWDER, FOR SOLUTION INTRAMUSCULAR; INTRAVENOUS at 16:27

## 2019-05-08 NOTE — PROGRESS NOTES
Interdisciplinary team rounds were held 5/8/2019 with the following team members:Care Management, Nursing, Pharmacy, Physical Therapy and Physician. Plan of care discussed. See clinical pathway and/or care plan for interventions and desired outcomes.     Plan:     Home on Friday

## 2019-05-08 NOTE — PROGRESS NOTES
Bedside and Verbal shift change report given to Medhat Ryan RN (oncoming nurse) by Dontrell Clark RN (offgoing nurse). Report included the following information SBAR, Kardex and ED Summary.

## 2019-05-08 NOTE — PROGRESS NOTES
[facility]    Medical Progress Note      NAME: Jessica Unger   :  1965  MRM:  [id number]    Date/Time: 2019  9:12 AM          Assessment and Plan:     Left foot infection - POA, I&D of ulcer with Bx by podiatry on . CT showed no drainable abscess or e/o osteomyelitis. Wound culture polymicrobial, including enterococcus, Providencia, and Stenotrophomonas. ID consulted and are giving IV tigecycline and CTX till 5/10/19. Lack of insurance coverage to do this outside hospital. OP vascular f/u with wound care and DEVEN  Boots recommended by podiatry. PT/OT eval states he will have no HH needs     HTN - Stable on lisinopril and Norvasc. IV hydralazine PRN     Chronic pain syndrome / Depression - Continue methadone (confirmed by pharmacy) and now gabapentin and oxycodone (increased temporarily due to acute pain but patient informed no pain meds will be given at discharge). Appreciate psychiatry consult. We have started sertraline and prn QHS trazodone.     ETOH abuse - per report. Pt denies. No signs of withdrawal. Goody vitamins      Gout - No symptoms on allopurinol.     Malignant neoplasm of urinary bladder - s/p surgery in 2018 at [facility]. Cr stable. Has missed his outpatient followup and he requests Urology to see him here. Continue flomax     Hypothyroidism - TSH markedly elevated presumed due to non compliance. Continue Synthroid at current dose for now, but repeat TFTs in 4 weeks     Hx of hemorrhagic stroke with left sided hemiparesis / Physical debility - Uses wheelchair and walker at baseline. PT/OT consulted. Continue supportive care     Chronic obstructive pulmonary disease - Stable. PRN nebs    Tobacco abuse - nicoderm         Subjective:     Chief Complaint:  Pain stable.   A tech bumped his foot last night, causing pain    ROS:  (bold if positive, if negative)    Tolerating some PT  Tolerating Diet        Objective:     Last 24hrs VS reviewed since prior progress note.  Most recent are:    Visit Vitals  /85 (BP 1 Location: Left arm)   Pulse 60   Temp 97.5 °F (36.4 °C)   Resp 17   Ht 6' 1\" (1.854 m)   Wt 108.9 kg (240 lb)   SpO2 97%   BMI 31.66 kg/m²     SpO2 Readings from Last 6 Encounters:   05/08/19 97%   10/12/18 96%   09/28/18 95%   08/03/18 100%   08/01/18 95%   06/08/18 97%    O2 Flow Rate (L/min): 8 l/min       Intake/Output Summary (Last 24 hours) at 5/8/2019 0912  Last data filed at 5/8/2019 0537  Gross per 24 hour   Intake --   Output 1350 ml   Net -1350 ml        Physical Exam:    Gen:  Obese, in no acute distress  HEENT:  Pink conjunctivae, PERRL, hearing intact to voice, moist mucous membranes  Neck:  Supple, without masses, thyroid non-tender  Resp:  No accessory muscle use, clear breath sounds without wheezes rales or rhonchi  Card:  No murmurs, normal S1, S2 without thrills, bruits or peripheral edema  Abd:  Soft, non-tender, non-distended, normoactive bowel sounds are present, no mass  Lymph:  No cervical or inguinal adenopathy  Musc:  No cyanosis or clubbing  Skin:  LLE in dressing with trace malleolar oozing, skin turgor is good  Neuro:  Cranial nerves are grossly intact, general motor weakness, follows commands   Psych:  Poor insight, oriented to person, place and time, alert    Telemetry reviewed:   normal sinus rhythm  __________________________________________________________________  Medications Reviewed: (see below)  Medications:     Current Facility-Administered Medications   Medication Dose Route Frequency    ibuprofen (MOTRIN) tablet 200 mg  200 mg Oral TID    oxyCODONE IR (ROXICODONE) tablet 20 mg  20 mg Oral Q6H PRN    gabapentin (NEURONTIN) capsule 400 mg  400 mg Oral BID    lisinopril (PRINIVIL, ZESTRIL) tablet 40 mg  40 mg Oral DAILY    folic acid (FOLVITE) tablet 1 mg  1 mg Oral DAILY    thiamine mononitrate (B-1) tablet 100 mg  100 mg Oral DAILY    therapeutic multivitamin (THERAGRAN) tablet 1 Tab  1 Tab Oral DAILY    nicotine (NICORETTE) gum 4 mg  4 mg Oral Q2H PRN    ondansetron (ZOFRAN) injection 4 mg  4 mg IntraVENous Q6H PRN    pantoprazole (PROTONIX) tablet 40 mg  40 mg Oral ACB    amLODIPine (NORVASC) tablet 10 mg  10 mg Oral DAILY    TIGEcycline (TYGACIL) 50 mg in 0.9% sodium chloride (MBP/ADV) 100 mL  50 mg IntraVENous Q12H    cefTRIAXone (ROCEPHIN) 2 g in 0.9% sodium chloride (MBP/ADV) 50 mL  2 g IntraVENous Q24H    sertraline (ZOLOFT) tablet 50 mg  50 mg Oral DAILY    traZODone (DESYREL) tablet 50 mg  50 mg Oral QHS    hydrALAZINE (APRESOLINE) 20 mg/mL injection 10 mg  10 mg IntraVENous Q6H PRN    lactobac ac& pc-s.therm-b.anim (YULIA Q/RISAQUAD)  1 Cap Oral DAILY    allopurinol (ZYLOPRIM) tablet 100 mg  100 mg Oral DAILY    levothyroxine (SYNTHROID) tablet 88 mcg  88 mcg Oral ACB    melatonin tablet 3 mg  3 mg Oral QHS PRN    tamsulosin (FLOMAX) capsule 0.4 mg  0.4 mg Oral DAILY    methadone (DOLOPHINE) 10 mg/mL concentrated solution 130 mg  130 mg Oral DAILY    sodium chloride (NS) flush 5-40 mL  5-40 mL IntraVENous Q8H    sodium chloride (NS) flush 5-40 mL  5-40 mL IntraVENous PRN    acetaminophen (TYLENOL) tablet 650 mg  650 mg Oral Q4H PRN    naloxone (NARCAN) injection 0.4 mg  0.4 mg IntraVENous PRN    enoxaparin (LOVENOX) injection 40 mg  40 mg SubCUTAneous Q24H        Lab Data Reviewed: (see below)  Lab Review:     No results for input(s): WBC, HGB, HCT, PLT, HGBEXT, HCTEXT, PLTEXT, HGBEXT, HCTEXT, PLTEXT in the last 72 hours. No results for input(s): NA, K, CL, CO2, GLU, BUN, CREA, CA, MG, PHOS, ALB, TBIL, TBILI, SGOT, ALT, INR in the last 72 hours.     No lab exists for component: INREXT, INREXT  Lab Results   Component Value Date/Time    Glucose (POC) 150 (H) 03/19/2018 11:45 AM    Glucose (POC) 123 (H) 03/18/2018 08:55 PM    Glucose (POC) 96 03/16/2018 04:58 PM    Glucose (POC) 116 (H) 12/12/2012 12:42 PM    Glucose (POC) 99 12/11/2012 11:27 PM     No results for input(s): PH, PCO2, PO2, HCO3, FIO2 in the last 72 hours. No results for input(s): INR in the last 72 hours. No lab exists for component: Gabriel Danielson  All Micro Results     Procedure Component Value Units Date/Time    CULTURE, BLOOD [[id number]] Collected:  04/23/19 2111    Order Status:  Completed Specimen:  Blood Updated:  04/29/19 0618     Special Requests: NO SPECIAL REQUESTS        Culture result: NO GROWTH 6 DAYS       CULTURE, WOUND Elease Hussar STAIN [[id number]]  (Abnormal)  (Susceptibility) Collected:  04/23/19 2111    Order Status:  Completed Specimen:  Wound from Foot Updated:  04/26/19 1326     Special Requests: NO SPECIAL REQUESTS        GRAM STAIN RARE WBCS SEEN         2+ GRAM NEGATIVE RODS               OCCASIONAL GRAM POSITIVE COCCI IN CLUSTERS            RARE GRAM POSITIVE RODS        Culture result:       HEAVY ENTEROCOCCUS FAECALIS GROUP D                  HEAVY PROVIDENCIA RETTGERI                  HEAVY STENOTROPHOMONAS (Irasema Sjogren.) MALTOPHILIA CLSI GUIDELINES DO NOT RECOMMEND REPORTING SUSCEPTIBILITIES FOR S. MALTOPHILIA. TRIMETHOPRIM/SULFAMETHOXAZOLE IS THE DRUG OF CHOICE. HEAVY DIPHTHEROIDS       CULTURE, MRSA [[id number]] Collected:  04/23/19 2315    Order Status:  Canceled Specimen:  Nares     CULTURE, WOUND [[id number]] Collected:  04/23/19 2300    Order Status:  Canceled Specimen:  Wound from Foot           Other pertinent lab: none    Total time spent with patient: 25 Minutes I reviewed chart, notes, data and current medications in the medical record. I have examined and treated the patient at bedside during this period.                  Care Plan discussed with: Patient, Care Manager, Nursing Staff and >50% of time spent in counseling and coordination of care    Discussed:  Care Plan    Prophylaxis:  H2B/PPI    Disposition:  Home w/Family           ___________________________________________________    Attending Physician: Aramis Bagley MD

## 2019-05-08 NOTE — PROGRESS NOTES
5/8/2019   CARE MANAGEMENT NOTE:  CM reviewed EMR for clinical updates and pt was discussed in IDRs re: Friday as anticipated discharge date. Per chart hx, pt is homeless. A referral had been made to the [facility], [facility] - [phone]). Transition Plan of Care:  1. Pt admitted for left foot infection with IV ABX planned thru 5/10.  2  CM met with pt to introduce myself and to confirm disposition. He states that he plans to return to his [facility] home where he had been staying previously. CM will continue to follow pt until hospital discharge. Will assist with any needs as appropriate.   Neelam

## 2019-05-08 NOTE — DISCHARGE SUMMARY
Physician Interim Discharge Summary     Patient ID:  Shahla Erazo  [id number]  38 y.o.  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 CT showed no drainable abscess or e/o osteomyelitis. Wound culture polymicrobial, including enterococcus, Providencia, and Stenotrophomonas. ID consulted and are giving IV tigecycline and CTX till 5/10/19. Lack of insurance coverage to do this outside hospital. OP vascular f/u with wound care and DEVEN  Boots recommended by podiatry. PT/OT eval states he will have no HH needs     HTN - Stable on lisinopril and Norvasc. IV hydralazine PRN     Chronic pain syndrome / Depression - Continue methadone (confirmed by pharmacy) and now gabapentin and oxycodone (increased temporarily due to acute pain but patient informed no pain meds will be given at discharge). Appreciate psychiatry consult. We have started sertraline and prn QHS trazodone.     ETOH abuse - per report. Pt denies. No signs of withdrawal. Goody vitamins      Gout - No symptoms on allopurinol.     Malignant neoplasm of urinary bladder - s/p surgery in April 2018 at [facility]. Cr stable. Has missed his outpatient followup and he requests Urology to see him here.   Continue flomax     Hypothyroidism - TSH markedly elevated presumed due to non compliance. Continue Synthroid at current dose for now, but repeat TFTs in 4 weeks     Hx of hemorrhagic stroke with left sided hemiparesis / Physical debility - Uses wheelchair and walker at baseline. PT/OT consulted. Continue supportive care     Chronic obstructive pulmonary disease - Stable.  PRN nebs     Tobacco abuse - nicoderm        PCP: Mary Delvalle MD    Consults: ID, Psychiatry and podiatry    Significant Diagnostic Studies: See Hospital Course    Further details by discharging physician    Signed:  Dyan Young MD  5/8/2019  12:14 PM

## 2019-05-08 NOTE — PROGRESS NOTES
Bedside shift change report given to Corrina Vega (oncoming nurse) by William Dozier RN (offgoing nurse). Report included the following information SBAR, MAR, Accordion, Recent Results and Med Rec Status.

## 2019-05-09 LAB
ANION GAP SERPL CALC-SCNC: 6 MMOL/L (ref 5–15)
BASOPHILS # BLD: 0.1 K/UL (ref 0–0.1)
BASOPHILS NFR BLD: 1 % (ref 0–1)
BUN SERPL-MCNC: 22 MG/DL (ref 6–20)
BUN/CREAT SERPL: 32 (ref 12–20)
CALCIUM SERPL-MCNC: 8 MG/DL (ref 8.5–10.1)
CHLORIDE SERPL-SCNC: 106 MMOL/L (ref 97–108)
CO2 SERPL-SCNC: 29 MMOL/L (ref 21–32)
CREAT SERPL-MCNC: 0.68 MG/DL (ref 0.7–1.3)
DIFFERENTIAL METHOD BLD: ABNORMAL
EOSINOPHIL # BLD: 0.7 K/UL (ref 0–0.4)
EOSINOPHIL NFR BLD: 6 % (ref 0–7)
ERYTHROCYTE [DISTWIDTH] IN BLOOD BY AUTOMATED COUNT: 14 % (ref 11.5–14.5)
GLUCOSE SERPL-MCNC: 110 MG/DL (ref 65–100)
HCT VFR BLD AUTO: 36 % (ref 36.6–50.3)
HGB BLD-MCNC: 11.4 G/DL (ref 12.1–17)
IMM GRANULOCYTES # BLD AUTO: 0.1 K/UL (ref 0–0.04)
IMM GRANULOCYTES NFR BLD AUTO: 1 % (ref 0–0.5)
LYMPHOCYTES # BLD: 3.9 K/UL (ref 0.8–3.5)
LYMPHOCYTES NFR BLD: 32 % (ref 12–49)
MCH RBC QN AUTO: 28.9 PG (ref 26–34)
MCHC RBC AUTO-ENTMCNC: 31.7 G/DL (ref 30–36.5)
MCV RBC AUTO: 91.4 FL (ref 80–99)
MONOCYTES # BLD: 1.1 K/UL (ref 0–1)
MONOCYTES NFR BLD: 9 % (ref 5–13)
NEUTS SEG # BLD: 6.1 K/UL (ref 1.8–8)
NEUTS SEG NFR BLD: 51 % (ref 32–75)
NRBC # BLD: 0 K/UL (ref 0–0.01)
NRBC BLD-RTO: 0 PER 100 WBC
PLATELET # BLD AUTO: 302 K/UL (ref 150–400)
PMV BLD AUTO: 10.8 FL (ref 8.9–12.9)
POTASSIUM SERPL-SCNC: 4.2 MMOL/L (ref 3.5–5.1)
RBC # BLD AUTO: 3.94 M/UL (ref 4.1–5.7)
SODIUM SERPL-SCNC: 141 MMOL/L (ref 136–145)
WBC # BLD AUTO: 12 K/UL (ref 4.1–11.1)

## 2019-05-09 PROCEDURE — 36415 COLL VENOUS BLD VENIPUNCTURE: CPT

## 2019-05-09 PROCEDURE — 65270000029 HC RM PRIVATE

## 2019-05-09 PROCEDURE — 74011250637 HC RX REV CODE- 250/637: Performed by: NURSE PRACTITIONER

## 2019-05-09 PROCEDURE — 74011250637 HC RX REV CODE- 250/637: Performed by: INTERNAL MEDICINE

## 2019-05-09 PROCEDURE — 85025 COMPLETE CBC W/AUTO DIFF WBC: CPT

## 2019-05-09 PROCEDURE — 74011250636 HC RX REV CODE- 250/636: Performed by: INTERNAL MEDICINE

## 2019-05-09 PROCEDURE — 74011250637 HC RX REV CODE- 250/637: Performed by: PODIATRIST

## 2019-05-09 PROCEDURE — 80048 BASIC METABOLIC PNL TOTAL CA: CPT

## 2019-05-09 PROCEDURE — 74011250636 HC RX REV CODE- 250/636: Performed by: PODIATRIST

## 2019-05-09 PROCEDURE — 74011000258 HC RX REV CODE- 258: Performed by: INTERNAL MEDICINE

## 2019-05-09 PROCEDURE — 94760 N-INVAS EAR/PLS OXIMETRY 1: CPT

## 2019-05-09 PROCEDURE — 74011250637 HC RX REV CODE- 250/637: Performed by: HOSPITALIST

## 2019-05-09 RX ADMIN — TAMSULOSIN HYDROCHLORIDE 0.4 MG: 0.4 CAPSULE ORAL at 09:20

## 2019-05-09 RX ADMIN — TRAZODONE HYDROCHLORIDE 50 MG: 50 TABLET ORAL at 21:36

## 2019-05-09 RX ADMIN — ACETAMINOPHEN 650 MG: 325 TABLET ORAL at 11:07

## 2019-05-09 RX ADMIN — METHADONE HYDROCHLORIDE 130 MG: 10 CONCENTRATE ORAL at 09:21

## 2019-05-09 RX ADMIN — LISINOPRIL 40 MG: 20 TABLET ORAL at 09:20

## 2019-05-09 RX ADMIN — THERA TABS 1 TABLET: TAB at 09:21

## 2019-05-09 RX ADMIN — SODIUM CHLORIDE 50 MG: 900 INJECTION, SOLUTION INTRAVENOUS at 04:10

## 2019-05-09 RX ADMIN — GABAPENTIN 400 MG: 100 CAPSULE ORAL at 17:46

## 2019-05-09 RX ADMIN — ACETAMINOPHEN 650 MG: 325 TABLET ORAL at 04:09

## 2019-05-09 RX ADMIN — FOLIC ACID 1 MG: 1 TABLET ORAL at 09:20

## 2019-05-09 RX ADMIN — OXYCODONE HYDROCHLORIDE 20 MG: 5 TABLET ORAL at 22:33

## 2019-05-09 RX ADMIN — Medication 100 MG: at 09:20

## 2019-05-09 RX ADMIN — SERTRALINE HYDROCHLORIDE 50 MG: 50 TABLET ORAL at 09:21

## 2019-05-09 RX ADMIN — Medication 10 ML: at 14:00

## 2019-05-09 RX ADMIN — LEVOTHYROXINE SODIUM 88 MCG: 88 TABLET ORAL at 06:21

## 2019-05-09 RX ADMIN — Medication 1 CAPSULE: at 09:20

## 2019-05-09 RX ADMIN — OXYCODONE HYDROCHLORIDE 20 MG: 5 TABLET ORAL at 12:41

## 2019-05-09 RX ADMIN — Medication 10 ML: at 16:27

## 2019-05-09 RX ADMIN — ENOXAPARIN SODIUM 40 MG: 40 INJECTION SUBCUTANEOUS at 09:21

## 2019-05-09 RX ADMIN — IBUPROFEN 200 MG: 200 TABLET, FILM COATED ORAL at 09:21

## 2019-05-09 RX ADMIN — Medication 10 ML: at 06:23

## 2019-05-09 RX ADMIN — OXYCODONE HYDROCHLORIDE 20 MG: 5 TABLET ORAL at 06:21

## 2019-05-09 RX ADMIN — ALLOPURINOL 100 MG: 100 TABLET ORAL at 09:21

## 2019-05-09 RX ADMIN — IBUPROFEN 200 MG: 200 TABLET, FILM COATED ORAL at 21:36

## 2019-05-09 RX ADMIN — CEFTRIAXONE SODIUM 2 G: 2 INJECTION, POWDER, FOR SOLUTION INTRAMUSCULAR; INTRAVENOUS at 16:25

## 2019-05-09 RX ADMIN — SODIUM CHLORIDE 50 MG: 900 INJECTION, SOLUTION INTRAVENOUS at 17:46

## 2019-05-09 RX ADMIN — OXYCODONE HYDROCHLORIDE 20 MG: 5 TABLET ORAL at 00:36

## 2019-05-09 RX ADMIN — GABAPENTIN 400 MG: 100 CAPSULE ORAL at 09:20

## 2019-05-09 RX ADMIN — ONDANSETRON 4 MG: 2 INJECTION INTRAMUSCULAR; INTRAVENOUS at 21:36

## 2019-05-09 RX ADMIN — PANTOPRAZOLE SODIUM 40 MG: 40 TABLET, DELAYED RELEASE ORAL at 06:21

## 2019-05-09 RX ADMIN — Medication 10 ML: at 21:36

## 2019-05-09 RX ADMIN — IBUPROFEN 200 MG: 200 TABLET, FILM COATED ORAL at 16:25

## 2019-05-09 RX ADMIN — AMLODIPINE BESYLATE 10 MG: 5 TABLET ORAL at 09:21

## 2019-05-09 NOTE — PROGRESS NOTES
Bedside shift change report given to Ora Siu RN (oncoming nurse) by [address]  RN (offgoing nurse). Report included the following information SBAR and Kardex.

## 2019-05-09 NOTE — PROGRESS NOTES
[facility]  [address]  [phone]      Medical Progress Note      NAME: Art Vasquez   :  1965  MRM:  [id number]    Date/Time: 2019        Assessment / Plan:     Left foot infection: I&D of ulcer with bx by podiatry on . CT showed no drainable abscess or e/o osteomyelitis. Wound culture polymicrobial, including enterococcus, Providencia, and Stenotrophomonas. ID consulted and are giving IV tigecycline and CTX till 5/10/19. Will need to stay in hospital due to lack of insurance coverage. Was having quite a bit of pain today; podiatry to assess     HTN: stable; continue lisinopril and Norvasc. IV hydralazine PRN     Chronic pain syndrome / Depression:  Continue methadone (confirmed by pharmacy) and now gabapentin and oxycodone (increased temporarily due to acute pain but patient informed no pain meds will be given at discharge). Appreciate psychiatry consult. Continue sertraline and prn QHS trazodone. Would watch QTc     ETOH abuse: per report. Pt denies. No signs of withdrawal. Goody vitamins      Gout: No symptoms on allopurinol.     Malignant neoplasm of urinary bladder - s/p surgery in 2018 at [facility]. Cr stable. Consulted urology but pt then declined and wanted to follow up with urologist in [facility]. Cont Flomax     Hypothyroidism: TSH markedly elevated with low FT4 due to non compliance. Continue Synthroid at current dose for now, but repeat TFTs in 4 weeks outpatient     Hx of hemorrhagic stroke with left sided hemiparesis / Physical debility - Uses wheelchair and walker at baseline. PT/OT consulted. Continue supportive care     Chronic obstructive pulmonary disease - Stable.  PRN nebs     Tobacco abuse - nicoderm        Total time spent: 25 minutes  Time spent in the care of this patient including reviewing records, discussing with nursing and/or other providers on the treatment team, obtaining history and examining the patient, and discussing treatment plans. Care Plan discussed with: Patient, Nursing Staff and >50% of time spent in counseling and coordination of care    Discussed:  Care Plan and D/C Planning    Prophylaxis:  Lovenox    Disposition:  Home w/Family         Subjective:     Chief Complaint:  Follow up foot ulcer    Chart/notes/labs/studies reviewed, patient examined at bedside. Complaining of severe foot pain. No fevers or chills        Objective:       Vitals:        Last 24hrs VS reviewed since prior progress note. Most recent are:    Visit Vitals  /65 (BP 1 Location: Left arm, BP Patient Position: Sitting)   Pulse 69   Temp 98 °F (36.7 °C)   Resp 16   Ht 6' 1\" (1.854 m)   Wt 108.9 kg (240 lb)   SpO2 93%   BMI 31.66 kg/m²     SpO2 Readings from Last 6 Encounters:   05/09/19 93%   10/12/18 96%   09/28/18 95%   08/03/18 100%   08/01/18 95%   06/08/18 97%    O2 Flow Rate (L/min): 8 l/min       Intake/Output Summary (Last 24 hours) at 5/9/2019 1715  Last data filed at 5/9/2019 1625  Gross per 24 hour   Intake 530 ml   Output 2700 ml   Net -2170 ml          Exam:     Physical Exam:    Gen:  Well-developed, well-nourished, in no acute distress  HEENT:  Sclerae nonicteric, hearing intact to voice, mucous membranes moist  Neck:  Supple, without masses. Resp:  No accessory muscle use, CTAB without wheezes, rales, or rhonchi  Card: RRR, without m/r/g. No LE edema. Abd:  +bowel sounds, soft, NTTP, nondistended. No HSM. Neuro: Face symmetric, tongue midline, speech fluent, follows commands appropriately  Psych:  Alert, oriented x 3.  Good insight  Skin: foot dressing with old scant drainage, clean/dry/intact otherwise     Medications Reviewed: (see below)    Lab Data Reviewed: (see below)    ______________________________________________________________________    Medications:     Current Facility-Administered Medications   Medication Dose Route Frequency    ibuprofen (MOTRIN) tablet 200 mg  200 mg Oral TID    oxyCODONE IR (ROXICODONE) tablet 20 mg  20 mg Oral Q6H PRN    gabapentin (NEURONTIN) capsule 400 mg  400 mg Oral BID    lisinopril (PRINIVIL, ZESTRIL) tablet 40 mg  40 mg Oral DAILY    folic acid (FOLVITE) tablet 1 mg  1 mg Oral DAILY    thiamine mononitrate (B-1) tablet 100 mg  100 mg Oral DAILY    therapeutic multivitamin (THERAGRAN) tablet 1 Tab  1 Tab Oral DAILY    nicotine (NICORETTE) gum 4 mg  4 mg Oral Q2H PRN    ondansetron (ZOFRAN) injection 4 mg  4 mg IntraVENous Q6H PRN    pantoprazole (PROTONIX) tablet 40 mg  40 mg Oral ACB    amLODIPine (NORVASC) tablet 10 mg  10 mg Oral DAILY    TIGEcycline (TYGACIL) 50 mg in 0.9% sodium chloride (MBP/ADV) 100 mL  50 mg IntraVENous Q12H    cefTRIAXone (ROCEPHIN) 2 g in 0.9% sodium chloride (MBP/ADV) 50 mL  2 g IntraVENous Q24H    sertraline (ZOLOFT) tablet 50 mg  50 mg Oral DAILY    traZODone (DESYREL) tablet 50 mg  50 mg Oral QHS    hydrALAZINE (APRESOLINE) 20 mg/mL injection 10 mg  10 mg IntraVENous Q6H PRN    lactobac ac& pc-s.therm-b.anim (YULIA Q/RISAQUAD)  1 Cap Oral DAILY    allopurinol (ZYLOPRIM) tablet 100 mg  100 mg Oral DAILY    levothyroxine (SYNTHROID) tablet 88 mcg  88 mcg Oral ACB    melatonin tablet 3 mg  3 mg Oral QHS PRN    tamsulosin (FLOMAX) capsule 0.4 mg  0.4 mg Oral DAILY    methadone (DOLOPHINE) 10 mg/mL concentrated solution 130 mg  130 mg Oral DAILY    sodium chloride (NS) flush 5-40 mL  5-40 mL IntraVENous Q8H    sodium chloride (NS) flush 5-40 mL  5-40 mL IntraVENous PRN    acetaminophen (TYLENOL) tablet 650 mg  650 mg Oral Q4H PRN    naloxone (NARCAN) injection 0.4 mg  0.4 mg IntraVENous PRN    enoxaparin (LOVENOX) injection 40 mg  40 mg SubCUTAneous Q24H            Lab Review:     Recent Labs     05/09/19  0240   WBC 12.0*   HGB 11.4*   HCT 36.0*        Recent Labs     05/09/19  0240      K 4.2      CO2 29   *   BUN 22*   CREA 0.68*   CA 8.0*     No components found for: [place]  No results for input(s): PH, PCO2, PO2, HCO3, FIO2 in the last 72 hours. No results for input(s): INR in the last 72 hours. No lab exists for component: INREXT  Lab Results   Component Value Date/Time    Specimen Description: BLOOD 02/04/2010 10:55 PM    Specimen Description: BLOOD 02/12/2009 11:30 PM     Lab Results   Component Value Date/Time    Culture result: NO GROWTH 6 DAYS 04/23/2019 09:11 PM    Culture result: HEAVY ENTEROCOCCUS FAECALIS GROUP D (A) 04/23/2019 09:11 PM    Culture result: HEAVY PROVIDENCIA RETTGERI (A) 04/23/2019 09:11 PM    Culture result: (A) 04/23/2019 09:11 PM     HEAVY STENOTROPHOMONAS (Libbykin Jacques.) MALTOPHILIA CLSI GUIDELINES DO NOT RECOMMEND REPORTING SUSCEPTIBILITIES FOR S. MALTOPHILIA. TRIMETHOPRIM/SULFAMETHOXAZOLE IS THE DRUG OF CHOICE.     Culture result: HEAVY DIPHTHEROIDS (A) 04/23/2019 09:11 PM              ___________________________________________________    Attending Physician: Sneha Bryant MD

## 2019-05-09 NOTE — PROGRESS NOTES
[facility] Progress Note           Soni Burrows DO    [phone] Office  [phone]  Fax    2019      Assessment & Plan:   1. Chronic left lower extremity wounds. Unable to tolerate MRI. CT negative for OM. S/p I&D by podiatry. No cultures sent. Biopsy inconclusive. Previous cultures growing SMALT, enterococcus and providencia. Patient allergic to sulfa. Organism is intermediate to ceftaz and cipro. Sensitive to tigecycline. Tigecycline has poor activity against providencia so added ceftriaxone. Anticipate patient will remain in hospital to complete abx through 5/10/19  2. Multiple antibiotic allergies include trimethoprim-sulfamethoxazole, ciprofloxacin, and vancomycin. 3.  History of hemorrhagic stroke with left-sided hemiparesis. Subjective: Tolerating abx  Developed some bleeding from wound    Objective:     Vitals:   Visit Vitals  /72 (BP 1 Location: Left arm, BP Patient Position: At rest)   Pulse (!) 56   Temp 97.7 °F (36.5 °C)   Resp 16   Ht 6' 1\" (1.854 m)   Wt 108.9 kg (240 lb)   SpO2 98%   BMI 31.66 kg/m²        Tmax:  Temp (24hrs), Av.7 °F (36.5 °C), Min:97.6 °F (36.4 °C), Max:97.8 °F (36.6 °C)      Exam:   Patient is intubated:  no    Physical Examination:   General:  NAD   Head:     Eyes:     Neck:        Lungs:   No distress   Chest wall:     Heart:     Abdomen:   non-distended   Extremities:    Skin: LLE dressed. Small amount of blood on dressing   Neurologic:      Labs:        No lab exists for component: ITNL   No results for input(s): CPK, CKMB, TROIQ in the last 72 hours. Recent Labs     19  0240      K 4.2      CO2 29   BUN 22*   CREA 0.68*   *   WBC 12.0*   HGB 11.4*   HCT 36.0*        No results for input(s): INR, PTP, APTT in the last 72 hours.     No lab exists for component: INREXT, INREXT  Needs: urine analysis, urine sodium, protein and creatinine  No results found for: VIVEK, ARIANNE      Cultures:     Lab Results   Component Value Date/Time    Specimen Description: BLOOD 02/04/2010 10:55 PM    Specimen Description: BLOOD 02/12/2009 11:30 PM     Lab Results   Component Value Date/Time    Culture result: NO GROWTH 6 DAYS 04/23/2019 09:11 PM    Culture result: HEAVY ENTEROCOCCUS FAECALIS GROUP D (A) 04/23/2019 09:11 PM    Culture result: HEAVY PROVIDENCIA RETTGERI (A) 04/23/2019 09:11 PM    Culture result: (A) 04/23/2019 09:11 PM     HEAVY STENOTROPHOMONAS (Quezada Mo.) MALTOPHILIA CLSI GUIDELINES DO NOT RECOMMEND REPORTING SUSCEPTIBILITIES FOR S. MALTOPHILIA. TRIMETHOPRIM/SULFAMETHOXAZOLE IS THE DRUG OF CHOICE.     Culture result: HEAVY DIPHTHEROIDS (A) 04/23/2019 09:11 PM       Radiology:     Medications       Current Facility-Administered Medications   Medication Dose Route Frequency Last Dose    ibuprofen (MOTRIN) tablet 200 mg  200 mg Oral  mg at 05/09/19 0921    oxyCODONE IR (ROXICODONE) tablet 20 mg  20 mg Oral Q6H PRN 20 mg at 05/09/19 1241    gabapentin (NEURONTIN) capsule 400 mg  400 mg Oral  mg at 05/09/19 0920    lisinopril (PRINIVIL, ZESTRIL) tablet 40 mg  40 mg Oral DAILY 40 mg at 55/55/40 7879    folic acid (FOLVITE) tablet 1 mg  1 mg Oral DAILY 1 mg at 05/09/19 0920    thiamine mononitrate (B-1) tablet 100 mg  100 mg Oral DAILY 100 mg at 05/09/19 0920    therapeutic multivitamin (THERAGRAN) tablet 1 Tab  1 Tab Oral DAILY 1 Tab at 05/09/19 0921    nicotine (NICORETTE) gum 4 mg  4 mg Oral Q2H PRN      ondansetron (ZOFRAN) injection 4 mg  4 mg IntraVENous Q6H PRN 4 mg at 05/08/19 1956    pantoprazole (PROTONIX) tablet 40 mg  40 mg Oral ACB 40 mg at 05/09/19 0621    amLODIPine (NORVASC) tablet 10 mg  10 mg Oral DAILY 10 mg at 05/09/19 0921    TIGEcycline (TYGACIL) 50 mg in 0.9% sodium chloride (MBP/ADV) 100 mL  50 mg IntraVENous Q12H 50 mg at 05/09/19 0410    cefTRIAXone (ROCEPHIN) 2 g in 0.9% sodium chloride (MBP/ADV) 50 mL  2 g IntraVENous Q24H 2 g at 05/08/19 1627    sertraline (ZOLOFT) tablet 50 mg  50 mg Oral DAILY 50 mg at 05/09/19 0921    traZODone (DESYREL) tablet 50 mg  50 mg Oral QHS 50 mg at 05/08/19 2134    hydrALAZINE (APRESOLINE) 20 mg/mL injection 10 mg  10 mg IntraVENous Q6H PRN      lactobac ac& pc-s.therm-b.anim (YULIA Q/RISAQUAD)  1 Cap Oral DAILY 1 Cap at 05/09/19 0920    allopurinol (ZYLOPRIM) tablet 100 mg  100 mg Oral DAILY 100 mg at 05/09/19 0921    levothyroxine (SYNTHROID) tablet 88 mcg  88 mcg Oral ACB 88 mcg at 05/09/19 3001    melatonin tablet 3 mg  3 mg Oral QHS PRN 3 mg at 05/06/19 2346    tamsulosin (FLOMAX) capsule 0.4 mg  0.4 mg Oral DAILY 0.4 mg at 05/09/19 0920    methadone (DOLOPHINE) 10 mg/mL concentrated solution 130 mg  130 mg Oral DAILY 130 mg at 05/09/19 0921    sodium chloride (NS) flush 5-40 mL  5-40 mL IntraVENous Q8H 10 mL at 05/09/19 0623    sodium chloride (NS) flush 5-40 mL  5-40 mL IntraVENous PRN 10 mL at 05/06/19 1524    acetaminophen (TYLENOL) tablet 650 mg  650 mg Oral Q4H  mg at 05/09/19 1107    naloxone (NARCAN) injection 0.4 mg  0.4 mg IntraVENous PRN      enoxaparin (LOVENOX) injection 40 mg  40 mg SubCUTAneous Q24H 40 mg at 05/09/19 7498           Case discussed with:       Arnold Parada DO

## 2019-05-09 NOTE — PROGRESS NOTES
visit for routine follow up. Staff with patient providing care. Will continue to follow up as needed and upon request as able. Visited by Rev. Ana Byrne, [address] [place] [address] Road paging service: [phone]

## 2019-05-09 NOTE — ROUTINE PROCESS
Interdisciplinary team rounds were held 5/9/2019 with the following team members:Care Management, Nursing, Pharmacy, Physical Therapy and Physician. Plan of care discussed. See clinical pathway and/or care plan for interventions and desired outcomes. Plan: IV abx through tomorrow.  Podiatry to see

## 2019-05-09 NOTE — ADT AUTH CERT NOTES
Wound and Skin Management [facility] - Care Day 16 (5/8/2019) by Sarah Self         Review Status Review Entered   Completed 5/9/2019 11:46       Criteria Review      Care Day: 16 Care Date: 5/8/2019 Level of Care: Inpatient Floor   Guideline Day 3    Level Of Care   (X) * Activity level acceptable    5/9/2019 11:46:16 EDT by Sarah Self    up with assistance       ( ) * Complete discharge planning       Clinical Status   (X) * Temperature status acceptable    5/9/2019 11:46:16 EDT by Sarah Self    afebrile       ( ) * No infection, or status acceptable    (X) * Pain and nausea absent or adequately managed    5/9/2019 11:46:16 EDT by Sarah Self    managed       ( ) * Wound and ulcer problems absent or status acceptable    (X) * No burn injury, or status acceptable    (X) * Traumatic injury absent or status appropriate    5/9/2019 11:46:16 EDT by Sarah Self    absent       ( ) * Skin disease absent or status appropriate    ( ) * General Discharge Criteria met       Interventions   (X) * Intake acceptable    5/9/2019 11:46:16 EDT by Sarah Self    regular diet, Ten QD, Ensure high protein QD       ( ) * No inpatient interventions needed       5/9/2019 11:46:16 EDT by Sarah Self   Subject: Additional Clinical Information       5/8/19 - Medical unit    97.5-60-/85, 98% on RA    Tylenol 650mg PO x 2, Zyloprim 100mg QD, Norvasc 10mg QD, Rocephin 2g IV QD, Lovenox 17WX QD, Folic acid 1mg PO QD, Neurontin 400mg BID, Motrin 200mg TID, Lisinopril 40mg QD, Methadone 130mg PO QD, Zofran 4mg IV x 1, Oxycodone IR 20mg x 4, Protonix 40mg PO QD, Zoloft 50mg QD, Flomax 0.4mg QD, MTV QD, Tygacil 50mg IV Q12h, Desyrel 50mg QHS, Zofran 4mg IV x 2    Orders: left postop shoe, up with assistance, oximetry spot check prn                             * Milestone      Additional Notes   Internal Medicine progress note 5/8/19:      Chief Complaint:  Pain stable.  A tech bumped his foot last night, causing pain Assessment and Plan:       Left foot infection - POA, I&D of ulcer with Bx by podiatry on 4/27. CT showed no drainable abscess or e/o osteomyelitis. Wound culture polymicrobial, including enterococcus, Providencia, and Stenotrophomonas.  ID consulted and are giving IV tigecycline and CTX till 5/10/19. Lack of insurance coverage to do this outside hospital. OP vascular f/u with wound care and DEVEN  Boots recommended by podiatry.  PT/OT eval states he will have no HH needs       HTN - Stable on lisinopril and Norvasc. IV hydralazine PRN       Chronic pain syndrome / Depression - Continue methadone (confirmed by pharmacy) and now gabapentin and oxycodone (increased temporarily due to acute pain but patient informed no pain meds will be given at discharge). Appreciate psychiatry consult. We have started sertraline and prn QHS trazodone.       ETOH abuse - per report. Pt denies. No signs of withdrawal. Goody vitamins        Gout - No symptoms on allopurinol.       Malignant neoplasm of urinary bladder - s/p surgery in April 2018 at [facility]. Cr stable. Has missed his outpatient followup and he requests Urology to see him here.  Continue flomax       Hypothyroidism - TSH markedly elevated presumed due to non compliance. Continue Synthroid at current dose for now, but repeat TFTs in 4 weeks       Hx of hemorrhagic stroke with left sided hemiparesis / Physical debility - Uses wheelchair and walker at baseline. PT/OT consulted. Continue supportive care       Chronic obstructive pulmonary disease - Stable.  PRN nebs       Tobacco abuse - nicoderm                    [facility] Management [facility] - Care Day 15 (5/7/2019) by Jade Junior         Review Status Review Entered   Completed 5/9/2019 11:37       Criteria Review      Care Day: 15 Care Date: 5/7/2019 Level of Care: Inpatient Floor   Guideline Day 3    Level Of Care   (X) * Activity level acceptable    5/9/2019 11:37:56 EDT by Jade Junior    up with assistance ( ) * Complete discharge planning       Clinical Status   (X) * Temperature status acceptable    5/9/2019 11:37:56 EDT by Matthew Joseph    afebrile       ( ) * No infection, or status acceptable    (X) * Pain and nausea absent or adequately managed    5/9/2019 11:37:56 EDT by Matthew Joseph    managed       ( ) * Wound and ulcer problems absent or status acceptable    (X) * No burn injury, or status acceptable    (X) * Traumatic injury absent or status appropriate    5/9/2019 11:37:56 EDT by Matthew Joseph    absent       ( ) * Skin disease absent or status appropriate    ( ) * General Discharge Criteria met       Interventions   (X) * Intake acceptable    5/9/2019 11:37:56 EDT by Matthew Joseph    regular diet       ( ) * No inpatient interventions needed       5/9/2019 11:37:56 EDT by Matthew Joseph   Subject: Additional Clinical Information      [facility] 5/7/19 - Medical unit    97.8-63-/69, 97% on RA    Tylenol 650mg PO x 2, Zyloprim 100mg QD, Norvasc 10mg QD, Rocephin 2g IV QD, Lovenox 57VT QD, Folic acid 1mg PO QD, Neurontin 400mg BID, Motrin 200mg TID, Lisinopril 40mg QD, Methadone 130mg PO QD, Oxycodone IR 20mg x 3, Protonix 40mg PO QD, Zoloft 50mg QD, Flomax 0.4mg QD, MTV QD, Tygacil 50mg IV Q12h, Desyrel 50mg QHS    Orders: left post-op shoe, oximetry spot check prn, up with assistance                             * Milestone      Additional Notes   Internal Medicine progress note 5/7/19:      Chief Complaint:  Pain stable.  No events overnight and no complaints this AM      Assessment and Plan:       Left foot infection - POA, I&D of ulcer with Bx by podiatry on 4/27. CT showed no drainable abscess or e/o osteomyelitis. Wound culture polymicrobial, including enterococcus, Providencia, and Stenotrophomonas.  ID consulted and are giving IV tigecycline and CTX till 5/10/19.  Lack of insurance coverage to do this outside hospital. OP vascular f/u with wound care and DEVEN  Boots recommended by podiatry      HTN - Stable on lisinopril and Norvasc. IV hydralazine PRN       Chronic pain syndrome / Depression - Continue methadone (confirmed by pharmacy) and now gabapentin and oxycodone (increased temporarily due to acute pain but patient informed no pain meds will be given at discharge). Appreciate psychiatry consult. We have started sertraline and prn QHS trazodone.       ETOH abuse - per report. Pt denies. No signs of withdrawal. Goody vitamins        Gout - No symptoms on allopurinol.       Malignant neoplasm of urinary bladder - s/p surgery in April 2018 at [facility]. Cr stable. Has missed his outpatient followup and he requests Urology to see him here.  Continue flomax       Hypothyroidism - TSH markedly elevated presumed due to non compliance. Continue Synthroid at current dose for now, but repeat TFTs in 4 weeks       Hx of hemorrhagic stroke with left sided hemiparesis / Physical debility - Uses wheelchair and walker at baseline. PT/OT consulted. Continue supportive care       Chronic obstructive pulmonary disease - Stable. PRN nebs       Tobacco abuse - nicoderm       ID progress note 5/7/19:      Assessment & Plan:   1. Chronic left lower extremity wounds. Unable to tolerate MRI. CT negative for OM. S/p I&D by podiatry. No cultures sent. Biopsy inconclusive. Previous cultures growing SMALT, enterococcus and providencia.  Patient allergic to sulfa.  Organism is intermediate to ceftaz and cipro. Sensitive to tigecycline. Tigecycline has poor activity against providencia so added ceftriaxone. Anticipate patient will remain in hospital to complete abx through 5/10/19   2. Multiple antibiotic allergies include trimethoprim-sulfamethoxazole, ciprofloxacin, and vancomycin. 3.  History of hemorrhagic stroke with left-sided hemiparesis.        PT progress note 5/7/19:      Strength:    Left hemiparesis with increased tone throughout LUE and LLE   RUE and RLE - WFLs       Functional Mobility Training:   Bed Mobility:   Rolling: Modified independent   Supine to Sit: Modified independent   Sit to Supine: Modified independent   Scooting: Modified independent   Transfers:   Sit to Stand: Modified independent   Stand to Sit: Modified independent   Stand Pivot Transfers: Modified independent          Balance:   Standing: With support   Standing - Static: Fair   Standing - Dynamic : Fair   Ambulation/Gait Training:   Distance (ft): 30 Feet (ft)   Assistive Device: Cane, straight;Gait belt   Ambulation - Level of Assistance: Modified independent   Gait Abnormalities: Antalgic;Hemiplegic   Base of Support: Center of gravity altered;Shift to right   Speed/Reva: Fluctuations   Step Length: Left shortened;Right shortened   Swing Pattern: Left asymmetrical       OT progress note 5/7/19:      ASSESSMENT:   Pt received seated on EOB.  Pt seen in OT to provide pt with larger tubi  for better joint integrity of L hand and prevent contractures. Pt receptive with trying larger tubi . OT cut two blue tubi  in 1/2 and taped together to open hand to facilitate stretching. Pt was having a hard time with placing tubi  in his hand stating that his hand was too tight because he needed to relax.  OT asked if he wanted help with stretching his hand to help place tubi . Pt declined OT and stated he could do it on his own.  Pt also wanted his sheets changed on bed.  OT started re-arranging furniture to provide pt with armed chair (vs chair with no arms) so nursing could change linens.  Pt became agitated and demanded everything be moved back to where it was. [facility] was tired of everyone moving his furniture.  Per PT/OT notes in the past week, pt has not wanted therapy and participation has been minimal. Pt has been resistant to any recommendations that would keep him safe and healthy.  Feel pt is not benefiting from OT services at this time and will complete OT order.                        Wound and Skin Management [facility] - Care Day 14 (5/6/2019) by Levar Hurley         Review Status Review Entered   Completed 5/9/2019 11:30       Criteria Review      Care Day: 14 Care Date: 5/6/2019 Level of Care: Inpatient Floor   Guideline Day 3    Level Of Care   (X) * Activity level acceptable    ( ) * Complete discharge planning       Clinical Status   (X) * Temperature status acceptable    5/9/2019 11:30:09 EDT by Levar Hurley    afebrile       ( ) * No infection, or status acceptable    (X) * Pain and nausea absent or adequately managed    5/9/2019 11:30:09 EDT by Levar Hurley    managed       ( ) * Wound and ulcer problems absent or status acceptable    (X) * No burn injury, or status acceptable    (X) * Traumatic injury absent or status appropriate    5/9/2019 11:30:09 EDT by Levar Hurley    absent       ( ) * Skin disease absent or status appropriate    ( ) * General Discharge Criteria met       Interventions   (X) * Intake acceptable    5/9/2019 11:30:09 EDT by Levar Hurley    regular diet       ( ) * No inpatient interventions needed       5/9/2019 11:30:09 EDT by Levar Hurley   Subject: Additional Clinical Information       5/6/19 - Medical unit    97.8-63-/69, 97% on RA    Tylenol 650mg PO x 1, Zyloprim 100mg QD, Norvasc 10mg QD, Rocephin 2g IV QD, Lovenox 44KQ QD, Folic acid 1mg PO QD, Neurontin 400mg BID, Motrin 200mg TID, Lisinopril 40mg QD, Methadone 130mg PO QD, Zofran 4mg IV x 1, Oxycodone IR 20mg x 5, Protonix 40mg PO QD, Zoloft 50mg QD, Flomax 0.4mg QD, MTV QD, Tygacil 50mg IV Q12h, Desyrel 50mg QHS    Orders: oximetry spot check prn, left post-op shoe, up with assistance                             * Milestone      Additional Notes   Internal Medicine progress note 5/6/19:      Chief Complaint:  Pain stable.  No events overnight and no complaints this AM      Assessment and Plan:       Left foot infection - POA, I&D of ulcer with Bx by podiatry on 4/27. CT showed no drainable abscess or e/o osteomyelitis.  Wound culture polymicrobial, including enterococcus, Providencia, and Stenotrophomonas.  ID consulted and are giving IV tigecycline and CTX till 5/10/19. Lack of insurance coverage to do this outside hospital. OP vascular f/u with wound care and DEEV[facility]  Boots recommended by podiatry        HTN - Stable on lisinopril and Norvasc. IV hydralazine PRN       Chronic pain syndrome / Depression - Continue methadone (confirmed by pharmacy) and now gabapentin and oxycodone (increased temporarily due to acute pain but patient informed no pain meds will be given at discharge). Appreciate psychiatry consult. We have started sertraline and prn QHS trazodone.       ETOH abuse - per report. Pt denies. No signs of withdrawal. Goody vitamins        Gout - No symptoms on allopurinol.       Malignant neoplasm of urinary bladder - s/p surgery in April 2018 at [facility] general. Cr stable. Has missed his outpatient followup and he requests Urology to see him here.  Continue flomax       Hypothyroidism - TSH markedly elevated presumed due to non compliance. Continue Synthroid at current dose for now, but repeat TFTs in 4 weeks       Hx of hemorrhagic stroke with left sided hemiparesis / Physical debility - Uses wheelchair and walker at baseline. PT/OT consulted. Continue supportive care       Chronic obstructive pulmonary disease - Stable. PRN nebs       Tobacco abuse - nicoderm       ID progress note 5/6/19:      Assessment & Plan:   1. Chronic left lower extremity wounds. Unable to tolerate MRI. CT negative for OM. S/p I&D by podiatry. No cultures sent. Biopsy inconclusive. . Cultures growing SMALT, enterococcus and providencia.  Patient allergic to sulfa.  Organism is intermediate to ceftaz and cipro. Sensitive to tigecycline. Tigecycline has poor activity against providencia so added ceftriaxone. Omadacycline (new tetracycline abx) and cefixime may be appropriate for PO deescalation at discharge but insurance will not cover due to cost. .  Per discussion with Dr Jordan Guerrero there is very low suspicion for OM based on sed rate, CT, and operative findings, Patient unable to tolerate MRI. Anticipate patient will remain in hospital to complete abx through 5/10/19   2. Multiple antibiotic allergies include trimethoprim-sulfamethoxazole, ciprofloxacin, and vancomycin. 3.  History of hemorrhagic stroke with left-sided hemiparesis. PT progress note 5/6/19:      1345 Pt received in bed, order received for post op shoe. Pt declining donning of shoe stating post op shoe is what started the wound to begin with. Offered pt trial quad cane or noe walker to offer larger base of support to improve standing posture and stability with gait training allowing for reduced sheer from post op shoe, pt continued to decline any OOB activity. Pt also described short CAM boot that he states he cannot manage \"all those straps\" secondary to hemiparetic LUE. Telephone Dr Wing Lakhani office to inquire of any other orthotic that may be appropriate for pt      OT progress note 5/6/19:      ASSESSMENT:   Cleared by RN to see pt for therapy session. Pt received supine in bed, LLE elevated and in unna boot. Discussed order for post op shoe per MD with pt. Pt flatly declining to wear postop shoe, reported he had worn a postop shoe before and that it had contributed to his wounds. Initially not agreeable to walk in room, but transferred supine>sit with CGA in order to use urinal. Pt's sitting balance mildly impaired 2/2 decreased trunk control but improved from previous session. LB dressing ADL and grooming ADL performed with supervision/setup in unsupported sit. Returned to check on pt after seated toileting ADL EOB, and he was ambulating back from bathroom with nursing. CGA using SPC, pt unsteady with antalgic gait however declining practice with AD that PTA had brought by earlier (hemiwalker, quad cane). Returned to bed at end of session with call bell in reach, alarm set and needs met.  Pt will benefit from continued OT to maximize functional performance.

## 2019-05-09 NOTE — PROGRESS NOTES
Bedside and Verbal shift change report given to Corinne Macias (oncoming nurse) by Roberto Bradford (offgoing nurse). Report included the following information SBAR, Kardex, Intake/Output, MAR and Recent Results.

## 2019-05-10 VITALS
DIASTOLIC BLOOD PRESSURE: 78 MMHG | BODY MASS INDEX: 31.81 KG/M2 | WEIGHT: 240 LBS | HEART RATE: 60 BPM | OXYGEN SATURATION: 92 % | RESPIRATION RATE: 14 BRPM | TEMPERATURE: 97.9 F | HEIGHT: 73 IN | SYSTOLIC BLOOD PRESSURE: 151 MMHG

## 2019-05-10 LAB
ALBUMIN SERPL-MCNC: 3.2 G/DL (ref 3.5–5)
ANION GAP SERPL CALC-SCNC: 5 MMOL/L (ref 5–15)
BASOPHILS # BLD: 0.1 K/UL (ref 0–0.1)
BASOPHILS NFR BLD: 1 % (ref 0–1)
BUN SERPL-MCNC: 24 MG/DL (ref 6–20)
BUN/CREAT SERPL: 33 (ref 12–20)
CALCIUM SERPL-MCNC: 8.1 MG/DL (ref 8.5–10.1)
CHLORIDE SERPL-SCNC: 105 MMOL/L (ref 97–108)
CO2 SERPL-SCNC: 29 MMOL/L (ref 21–32)
CREAT SERPL-MCNC: 0.73 MG/DL (ref 0.7–1.3)
DIFFERENTIAL METHOD BLD: ABNORMAL
EOSINOPHIL # BLD: 0.7 K/UL (ref 0–0.4)
EOSINOPHIL NFR BLD: 7 % (ref 0–7)
ERYTHROCYTE [DISTWIDTH] IN BLOOD BY AUTOMATED COUNT: 14.2 % (ref 11.5–14.5)
GLUCOSE SERPL-MCNC: 110 MG/DL (ref 65–100)
HCT VFR BLD AUTO: 35.3 % (ref 36.6–50.3)
HGB BLD-MCNC: 11.4 G/DL (ref 12.1–17)
IMM GRANULOCYTES # BLD AUTO: 0.1 K/UL (ref 0–0.04)
IMM GRANULOCYTES NFR BLD AUTO: 1 % (ref 0–0.5)
LYMPHOCYTES # BLD: 3.5 K/UL (ref 0.8–3.5)
LYMPHOCYTES NFR BLD: 32 % (ref 12–49)
MAGNESIUM SERPL-MCNC: 1.9 MG/DL (ref 1.6–2.4)
MCH RBC QN AUTO: 29.7 PG (ref 26–34)
MCHC RBC AUTO-ENTMCNC: 32.3 G/DL (ref 30–36.5)
MCV RBC AUTO: 91.9 FL (ref 80–99)
MONOCYTES # BLD: 0.9 K/UL (ref 0–1)
MONOCYTES NFR BLD: 9 % (ref 5–13)
NEUTS SEG # BLD: 5.7 K/UL (ref 1.8–8)
NEUTS SEG NFR BLD: 50 % (ref 32–75)
NRBC # BLD: 0 K/UL (ref 0–0.01)
NRBC BLD-RTO: 0 PER 100 WBC
PHOSPHATE SERPL-MCNC: 3.6 MG/DL (ref 2.6–4.7)
PLATELET # BLD AUTO: 296 K/UL (ref 150–400)
PMV BLD AUTO: 10.8 FL (ref 8.9–12.9)
POTASSIUM SERPL-SCNC: 4.2 MMOL/L (ref 3.5–5.1)
RBC # BLD AUTO: 3.84 M/UL (ref 4.1–5.7)
SODIUM SERPL-SCNC: 139 MMOL/L (ref 136–145)
WBC # BLD AUTO: 11.1 K/UL (ref 4.1–11.1)

## 2019-05-10 PROCEDURE — 74011250637 HC RX REV CODE- 250/637: Performed by: NURSE PRACTITIONER

## 2019-05-10 PROCEDURE — 74011250637 HC RX REV CODE- 250/637: Performed by: INTERNAL MEDICINE

## 2019-05-10 PROCEDURE — 74011250637 HC RX REV CODE- 250/637: Performed by: PODIATRIST

## 2019-05-10 PROCEDURE — 97162 PT EVAL MOD COMPLEX 30 MIN: CPT

## 2019-05-10 PROCEDURE — 94760 N-INVAS EAR/PLS OXIMETRY 1: CPT

## 2019-05-10 PROCEDURE — 74011250636 HC RX REV CODE- 250/636: Performed by: PODIATRIST

## 2019-05-10 PROCEDURE — 80069 RENAL FUNCTION PANEL: CPT

## 2019-05-10 PROCEDURE — 74011250637 HC RX REV CODE- 250/637: Performed by: HOSPITALIST

## 2019-05-10 PROCEDURE — 74011250636 HC RX REV CODE- 250/636: Performed by: INTERNAL MEDICINE

## 2019-05-10 PROCEDURE — 83735 ASSAY OF MAGNESIUM: CPT

## 2019-05-10 PROCEDURE — 74011000258 HC RX REV CODE- 258: Performed by: INTERNAL MEDICINE

## 2019-05-10 PROCEDURE — 36415 COLL VENOUS BLD VENIPUNCTURE: CPT

## 2019-05-10 PROCEDURE — 85025 COMPLETE CBC W/AUTO DIFF WBC: CPT

## 2019-05-10 PROCEDURE — 77030006402 HC SHOE PSTOP RIG DJOR -A

## 2019-05-10 PROCEDURE — 77030036687 HC SHOE PSTOP S2SG -A

## 2019-05-10 PROCEDURE — 97530 THERAPEUTIC ACTIVITIES: CPT

## 2019-05-10 RX ORDER — SERTRALINE HYDROCHLORIDE 50 MG/1
50 TABLET, FILM COATED ORAL DAILY
Qty: 30 TAB | Refills: 0 | Status: SHIPPED | OUTPATIENT
Start: 2019-05-11 | End: 2019-05-20

## 2019-05-10 RX ORDER — GABAPENTIN 400 MG/1
400 CAPSULE ORAL 2 TIMES DAILY
Qty: 60 CAP | Refills: 0 | Status: SHIPPED | OUTPATIENT
Start: 2019-05-10 | End: 2019-05-27

## 2019-05-10 RX ORDER — LISINOPRIL 20 MG/1
40 TABLET ORAL DAILY
Qty: 30 TAB | Refills: 0 | Status: ON HOLD | OUTPATIENT
Start: 2019-05-10 | End: 2020-10-21

## 2019-05-10 RX ORDER — AMLODIPINE BESYLATE 10 MG/1
10 TABLET ORAL DAILY
Qty: 30 TAB | Refills: 0 | Status: SHIPPED | OUTPATIENT
Start: 2019-05-11 | End: 2019-10-21

## 2019-05-10 RX ADMIN — METHADONE HYDROCHLORIDE 130 MG: 10 CONCENTRATE ORAL at 09:40

## 2019-05-10 RX ADMIN — ACETAMINOPHEN 650 MG: 325 TABLET ORAL at 03:39

## 2019-05-10 RX ADMIN — ALLOPURINOL 100 MG: 100 TABLET ORAL at 09:40

## 2019-05-10 RX ADMIN — Medication 10 ML: at 06:33

## 2019-05-10 RX ADMIN — OXYCODONE HYDROCHLORIDE 20 MG: 5 TABLET ORAL at 06:33

## 2019-05-10 RX ADMIN — ACETAMINOPHEN 650 MG: 325 TABLET ORAL at 11:39

## 2019-05-10 RX ADMIN — LEVOTHYROXINE SODIUM 88 MCG: 88 TABLET ORAL at 06:33

## 2019-05-10 RX ADMIN — IBUPROFEN 200 MG: 200 TABLET, FILM COATED ORAL at 16:03

## 2019-05-10 RX ADMIN — SERTRALINE HYDROCHLORIDE 50 MG: 50 TABLET ORAL at 09:40

## 2019-05-10 RX ADMIN — SODIUM CHLORIDE 50 MG: 900 INJECTION, SOLUTION INTRAVENOUS at 04:08

## 2019-05-10 RX ADMIN — GABAPENTIN 400 MG: 100 CAPSULE ORAL at 09:40

## 2019-05-10 RX ADMIN — OXYCODONE HYDROCHLORIDE 20 MG: 5 TABLET ORAL at 12:32

## 2019-05-10 RX ADMIN — FOLIC ACID 1 MG: 1 TABLET ORAL at 09:40

## 2019-05-10 RX ADMIN — CEFTRIAXONE SODIUM 2 G: 2 INJECTION, POWDER, FOR SOLUTION INTRAMUSCULAR; INTRAVENOUS at 16:03

## 2019-05-10 RX ADMIN — SODIUM CHLORIDE 50 MG: 900 INJECTION, SOLUTION INTRAVENOUS at 16:39

## 2019-05-10 RX ADMIN — IBUPROFEN 200 MG: 200 TABLET, FILM COATED ORAL at 09:40

## 2019-05-10 RX ADMIN — THERA TABS 1 TABLET: TAB at 09:40

## 2019-05-10 RX ADMIN — AMLODIPINE BESYLATE 10 MG: 5 TABLET ORAL at 09:40

## 2019-05-10 RX ADMIN — ENOXAPARIN SODIUM 40 MG: 40 INJECTION SUBCUTANEOUS at 09:40

## 2019-05-10 RX ADMIN — Medication 100 MG: at 09:40

## 2019-05-10 RX ADMIN — OXYCODONE HYDROCHLORIDE 20 MG: 5 TABLET ORAL at 18:41

## 2019-05-10 RX ADMIN — TAMSULOSIN HYDROCHLORIDE 0.4 MG: 0.4 CAPSULE ORAL at 09:40

## 2019-05-10 RX ADMIN — ACETAMINOPHEN 650 MG: 325 TABLET ORAL at 17:12

## 2019-05-10 RX ADMIN — LISINOPRIL 40 MG: 20 TABLET ORAL at 09:40

## 2019-05-10 RX ADMIN — ONDANSETRON 4 MG: 2 INJECTION INTRAMUSCULAR; INTRAVENOUS at 09:49

## 2019-05-10 RX ADMIN — Medication 1 CAPSULE: at 09:40

## 2019-05-10 RX ADMIN — PANTOPRAZOLE SODIUM 40 MG: 40 TABLET, DELAYED RELEASE ORAL at 06:33

## 2019-05-10 RX ADMIN — GABAPENTIN 400 MG: 100 CAPSULE ORAL at 17:11

## 2019-05-10 NOTE — PROGRESS NOTES
Bedside shift change report given to Shawn Drake RN (oncoming nurse) by Priscila Bennett RN (offgoing nurse). Report included the following information SBAR and Kardex.

## 2019-05-10 NOTE — DISCHARGE INSTRUCTIONS
HOSPITALIST DISCHARGE INSTRUCTIONS  NAME: Jas Queen   :  1965   MRN:  [id number]     Date/Time:  5/10/2019 12:52 PM    ADMIT DATE: 2019     DISCHARGE DATE: 5/10/2019     PRINCIPAL DISCHARGE DIAGNOSES:  Foot ulcer / infection    MEDICATIONS:  · It is important that you take the medication exactly as they are prescribed. Note the changes and additions to your medications. Be sure you understand these changes before you are discharged today. · Keep your medication in the bottles provided by the pharmacist and keep a list of the medication names, dosages, and times to be taken in your wallet. · Do not take other medications without consulting your doctor. Pain Management: per above medications    What to do at Home    Recommended diet:  Resume previous diet    Recommended activity: Activity as tolerated    If you experience any of the following symptoms then please call your primary care physician or return to the emergency room if you cannot get hold of your doctor:  Fever, chills, severe abdominal pain, nausea, vomiting, diarrhea, bleeding, wound drainage, severe chest pain, shortness of breath, or other severe concerning symptoms that brought you to the hospital in the first place    Follow Up: Follow-up Information     Follow up With Specialties Details Why Heidi Dill MD Internal Medicine   [address] [facility]  [phone]      Follow up with Dr. Linsey Mccollum podiatrist as instructed  On 2019 follow up on Mon             Information obtained by :  I understand that if any problems occur once I am at home I am to contact my physician. I understand and acknowledge receipt of the instructions indicated above.                                                                                                                                            Physician's or R.N.'s Signature Date/Time                                                                                                                                              Patient or Representative Signature                                                          Date/Time

## 2019-05-10 NOTE — PROGRESS NOTES
Interdisciplinary team rounds were held 5/10/2019 with the following team members:Care Management, Nursing, Pharmacy, Physical Therapy and Physician. Plan of care discussed. See clinical pathway and/or care plan for interventions and desired outcomes. Probable discharge today following PT/OT assessment and completion of 4pm IV antibiotics.

## 2019-05-10 NOTE — DISCHARGE SUMMARY
Physician Discharge Summary     Patient ID:  Emanuel Presley  [id number]  68 y.o.  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 (3/6/2018)      Brain aneurysm (12/30/2013)      Major depression (6/6/2018)      Chronic obstructive pulmonary disease ([facility] [address] (8/8/2018)      Hypokalemia (8/8/2018)      Tobacco abuse (8/8/2018)      Gout (10/12/2018)      Hypothyroidism (10/12/2018)       Patient Active Problem List   Diagnosis Code    Stroke ([facility] [address] I63.9    Hypertension I10    Thromboembolism (HCC) I74.9    Cerebral hemorrhage with hemiparesis (HCC) I61.9, G81.90    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    Cerebral infarction (HCC) I63.9    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    Drug overdose T50.901A    HTN (hypertension) I10    Cellulitis of left lower extremity [address]. 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    FH: bladder cancer Z80.52    Left foot infection L08.9    Major depression Y86.2    Uncomplicated opioid dependence (HCC) F11.20    Chronic obstructive pulmonary disease (HCC) J44.9    Chronic pain disorder G89.4    Hypokalemia E87.6    Seizure disorder (HCC) G40.909    Tobacco abuse Z72.0    Foot ulcer ([facility] [address] L97.509    Nonadherence to medical treatment Z91.19    Sinus bradycardia R00.1    Gout M10.9    Hypothyroidism E03.9    Foot infection L08.9         Hospital Course:   Mr. Lucio Farooq is a 45 yo WM w/ hx of CVA w/ hemiplegia, HTN, chronic pain admitted for left foot infection. Hospital course complicated by problems as listed below:    Left foot infection: I&D of ulcer with bx by podiatry on 4/27. CT showed no drainable abscess or e/o osteomyelitis. Wound culture polymicrobial, including enterococcus, Providencia, and Stenotrophomonas.  ID consulted and recommended IV tigecycline and CTX till 5/10/19. stable for discharge. Assessed by podiatry last night per report. Has close follow up on Monday     HTN: stable; continue lisinopril and Norvasc     Chronic pain syndrome / Depression:  Continue methadone (confirmed by pharmacy) and now gabapentin and oxycodone (increased temporarily due to acute pain but patient informed no pain meds will be given at discharge). Appreciate psychiatry consult. Continue sertraline. Would monitor QTc     ETOH abuse: per report. Pt denies. No signs of withdrawal     Gout: No symptoms on allopurinol.     Malignant neoplasm of urinary bladder - s/p surgery in April 2018 at [facility]. Cr stable. Consulted urology but pt then declined and wanted to follow up with urologist in [place]. Cont Flomax     Hypothyroidism: TSH markedly elevated with low FT4 due to non compliance. Continue Synthroid at current dose for now, but repeat TFTs in 4 weeks outpatient     Hx of hemorrhagic stroke with left sided hemiparesis / Physical debility - Uses wheelchair and walker at baseline. PT/OT consulted. Continue supportive care     Chronic obstructive pulmonary disease - Stable. PRN nebs     Tobacco abuse - nicoderm given        Pt discharged in improved and stable condition.      Procedures performed: see above    Imaging studies:   CT LE  Diffuse skin thickening and subcutaneous edema. No evidence of a focal drainable  fluid collection. No evidence of osteomyelitis      PCP: Vitaliy Diamond MD    Consults: ID, Psychiatry and podiatry    Discharge Exam:  Patient Vitals for the past 12 hrs:   Temp Pulse Resp BP SpO2   05/10/19 1133 97.9 °F (36.6 °C) 60 14 151/78 92 %   05/10/19 0742 97.8 °F (36.6 °C) 62 16 129/72 95 %     GEN: NAD  CV: RRR  RESP: CTAB    Disposition: home    Patient Instructions:   Current Discharge Medication List      START taking these medications    Details   amLODIPine (NORVASC) 10 mg tablet Take 1 Tab by mouth daily. Qty: 30 Tab, Refills: 0      gabapentin (NEURONTIN) 400 mg capsule Take 1 Cap by mouth two (2) times a day. Qty: 60 Cap, Refills: 0      sertraline (ZOLOFT) 50 mg tablet Take 1 Tab by mouth daily. Indications: major depressive disorder  Qty: 30 Tab, Refills: 0         CONTINUE these medications which have CHANGED    Details   lisinopril (PRINIVIL, ZESTRIL) 20 mg tablet Take 2 Tabs by mouth daily. Indications: high blood pressure  Qty: 30 Tab, Refills: 0         CONTINUE these medications which have NOT CHANGED    Details   methadone (DOLOPHINE) 10 mg/mL solution Take 130 mg by mouth daily. colchicine 0.6 mg tablet Take 0.6 mg by mouth daily as needed (gout flare). acetaminophen (TYLENOL) 325 mg tablet Take 2 Tabs by mouth every four (4) hours as needed. Indications: Arthritic Pain  Qty: 30 Tab, Refills: 0      ibuprofen (MOTRIN) 600 mg tablet Take 1 Tab by mouth every six (6) hours as needed. Take on full stomach  Qty: 10 Tab, Refills: 0      oxyCODONE-acetaminophen (PERCOCET) 5-325 mg per tablet Take 1 Tab by mouth every four (4) hours as needed. Max Daily Amount: 6 Tabs. Qty: 5 Tab, Refills: 0    Associated Diagnoses: Left foot infection      senna-docusate (PERICOLACE) 8.6-50 mg per tablet Take 1 Tab by mouth two (2) times a day. Qty: 60 Tab, Refills: 0      melatonin 3 mg tablet Take 1 Tab by mouth nightly as needed.   Qty: 10 Tab, Refills: 0      levothyroxine (SYNTHROID) 88 mcg tablet Take 88 mcg by mouth Daily (before breakfast). tamsulosin (FLOMAX) 0.4 mg capsule Take 0.4 mg by mouth daily. Indications: bladder cancer      allopurinol (ZYLOPRIM) 100 mg tablet Take 1 Tab by mouth daily. Qty: 30 Tab, Refills: 0         STOP taking these medications       varenicline (CHANTIX) 1 mg tablet Comments:   Reason for Stopping:         methadone (DOLOPHINE) 5 mg/5 mL oral solution Comments:   Reason for Stopping:               Activity: See discharge instructions  Diet: See discharge instructions  Wound Care: See discharge instructions    Follow-up Information     Follow up With Specialties Details Why Contact Info    Shobha Atkinson MD Internal Medicine   [address] [place] [address] 20  [facility] [place] [address]  [phone]      Follow up with Dr. Micky Mack podiatrist as instructed  On 5/13/2019 follow up on Mon           I spent 35 minutes on this discharge.     Signed:  Jerod Gunderson MD  5/10/2019  12:52 PM

## 2019-05-10 NOTE — PROGRESS NOTES
[facility][note 3] Podiatric Surgery  Progress Note  Subjective:  Beverly Mcnamarailian: following for left lower extremity ulcerations. Afebrile    ROS:   Constitutional: Negative for fever, chills, weight loss. Positive for fatigue  HENT: Negative for nosebleeds and congestion. Eyes: Negative for blurred vision and double vision. Respiratory: Negative for cough, shortness of breath and wheezing. Cardiovascular: Negative for chest pain, palpitations, claudication. Positive for leg swelling  Musculoskeletal: positive for weakness and pain  Skin: positive for wounds  Neurological: Negative for dizziness. Positive for weakness    Psychiatric/Behavioral: positive for depression    Objective:  Blood pressure 151/78, pulse 60, temperature 97.9 °F (36.6 °C), resp. rate 14, height 6' 1\" (1.854 m), weight 108.9 kg (240 lb), SpO2 92 %. Intake/Output Summary (Last 24 hours) at 5/11/2019 0811  Last data filed at 5/10/2019 1638  Gross per 24 hour   Intake 240 ml   Output 1950 ml   Net -1710 ml         Physical Exam:  General appearance: alert, cooperative, no distress, appears stated age     Lower Extremity Exam:  Vascular:    Dorsalis Pedis Pulse: present  Posterior Tibialis Pulse: present  Popliteal and Femoral Pulses: present  Skin Temp: warm to warm right and left lower extremities legs to toes  Extremity Edema: +1 pitting with chronic hemosiderin deposition  Varicosities: present     Neurological:  Deep Tendon Reflexes of Achilles and Patellar: diminished but intact right. Diminished left  Epicritic and Protective Sensations: absent     Deep Pain Response: present     Dermatological:  Toenails: mycotic 1-5 right and left foot  Ulceration:  Ulceration medial left heel. Measures 4cm x 3cm x 0.1 with increasing granulation tissue. No erythema or purulence.  Decreasing drainage    Dorsal foot ulceration   Measures 1.4cm x 1.7cm x 0.1 with increasing granulation tissue    Less pain noted during dressing changes.     Rash: absent     Musculoskeletal:  Gait and Station: antalgic and foot drop    Labs:  Lab Results   Component Value Date/Time    WBC 11.1 05/10/2019 04:17 AM    HGB 11.4 (L) 05/10/2019 04:17 AM    HCT 35.3 (L) 05/10/2019 04:17 AM    MCV 91.9 05/10/2019 04:17 AM    PLATELET 628 00/17/4781 04:17 AM     Lab Results   Component Value Date/Time    Sodium 139 05/10/2019 04:17 AM    Potassium 4.2 05/10/2019 04:17 AM    Chloride 105 05/10/2019 04:17 AM    CO2 29 05/10/2019 04:17 AM    BUN 24 (H) 05/10/2019 04:17 AM    Glucose 110 (H) 05/10/2019 04:17 AM     Lab Results   Component Value Date/Time    INR 1.1 12/21/2017 11:28 AM     Current Medications:  No current facility-administered medications for this encounter. Current Outpatient Medications:     lisinopril (PRINIVIL, ZESTRIL) 20 mg tablet, Take 2 Tabs by mouth daily. Indications: high blood pressure, Disp: 30 Tab, Rfl: 0    amLODIPine (NORVASC) 10 mg tablet, Take 1 Tab by mouth daily. , Disp: 30 Tab, Rfl: 0    gabapentin (NEURONTIN) 400 mg capsule, Take 1 Cap by mouth two (2) times a day., Disp: 60 Cap, Rfl: 0    sertraline (ZOLOFT) 50 mg tablet, Take 1 Tab by mouth daily. Indications: major depressive disorder, Disp: 30 Tab, Rfl: 0    methadone (DOLOPHINE) 10 mg/mL solution, Take 130 mg by mouth daily. , Disp: , Rfl:     colchicine 0.6 mg tablet, Take 0.6 mg by mouth daily as needed (gout flare). , Disp: , Rfl:     acetaminophen (TYLENOL) 325 mg tablet, Take 2 Tabs by mouth every four (4) hours as needed. Indications: Arthritic Pain, Disp: 30 Tab, Rfl: 0    ibuprofen (MOTRIN) 600 mg tablet, Take 1 Tab by mouth every six (6) hours as needed. Take on full stomach, Disp: 10 Tab, Rfl: 0    oxyCODONE-acetaminophen (PERCOCET) 5-325 mg per tablet, Take 1 Tab by mouth every four (4) hours as needed.  Max Daily Amount: 6 Tabs., Disp: 5 Tab, Rfl: 0    senna-docusate (PERICOLACE) 8.6-50 mg per tablet, Take 1 Tab by mouth two (2) times a day., Disp: 60 Tab, Rfl: 0   melatonin 3 mg tablet, Take 1 Tab by mouth nightly as needed. , Disp: 10 Tab, Rfl: 0    levothyroxine (SYNTHROID) 88 mcg tablet, Take 88 mcg by mouth Daily (before breakfast). , Disp: , Rfl:     tamsulosin (FLOMAX) 0.4 mg capsule, Take 0.4 mg by mouth daily. Indications: bladder cancer, Disp: , Rfl:     allopurinol (ZYLOPRIM) 100 mg tablet, Take 1 Tab by mouth daily. , Disp: 30 Tab, Rfl: 0    Pathology:     FINAL PATHOLOGIC DIAGNOSIS   Skin, left foot, biopsy:   Ulcer and granulation tissue   No evidence of malignancy   See comment   Comment   Microscopic examination of the H&E slides shows epidermal necrosis with overlying crust containing numerous neutrophils. Underlying the ulcer is granulation tissue-like vascular proliferation and mild neutrophilic inflammation. Gram and Giemsa stains are negative for bacteria and parasites. [employer] and Norma stains are negative for mycobacteria. GMS and PAS stains are negative for fungal organisms and a Warthin's starry stain is negative for spirochetes. Negative stains do not entirely exclude an infectious etiology. Otherwise, the findings are not specific but could be seen in pyoderma gangrenosa. However, this is a diagnosis of exclusion and requires clinical and microbiologic correlation. Impression  1. Ulcerations x2 left lower extremity  2. Venous insufficiency. Plan: -. Reviewed pathology. No definitive diagnosis. Venous insufficiency most likely given patient compliance issues with previous management. I have recommended a trial of unna boot compression therapy which will be changed weekly in my office. Explained that follow up and treatment consistency is critical for wound healing and limb salvage. Antibiotics per ID.     Isaac Cousin boot applied to left lower extremity.    - Discharge per hospitalist. Scheduled to follow up in my office Monday. Following. Manjinder Olvera.  Vincent, JENAE FACFAS FACCWS FACFAOM  Triple Board Certified [facility] [place]  [phone] cell

## 2019-05-10 NOTE — PROGRESS NOTES
Pharmacist Discharge Medication Reconciliation    Discharge Provider:  Dr. Lottie Bingham       Discharge Medications:      My Medications        START taking these medications        Instructions Each Dose to Equal Morning Noon Evening Bedtime   amLODIPine 10 mg tablet  Commonly known as:  Christopher Damon  Start taking on:  5/11/2019    Your last dose was: Your next dose is: Take 1 Tab by mouth daily. 10 mg                 gabapentin 400 mg capsule  Commonly known as:  NEURONTIN    Your last dose was: Your next dose is: Take 1 Cap by mouth two (2) times a day. 400 mg                 sertraline 50 mg tablet  Commonly known as:  ZOLOFT  Start taking on:  5/11/2019    Your last dose was: Your next dose is: Take 1 Tab by mouth daily. Indications: major depressive disorder   50 mg                        CHANGE how you take these medications        Instructions Each Dose to Equal Morning Noon Evening Bedtime   lisinopril 20 mg tablet  Commonly known as:  Estuardo Rowe  What changed:  how much to take    Your last dose was: Your next dose is: Take 2 Tabs by mouth daily. Indications: high blood pressure   40 mg                        CONTINUE taking these medications        Instructions Each Dose to Equal Morning Noon Evening Bedtime   acetaminophen 325 mg tablet  Commonly known as:  TYLENOL    Your last dose was: Your next dose is: Take 2 Tabs by mouth every four (4) hours as needed. Indications: Arthritic Pain   650 mg                 allopurinol 100 mg tablet  Commonly known as:  ZYLOPRIM    Your last dose was: Your next dose is: Take 1 Tab by mouth daily. 100 mg                 colchicine 0.6 mg tablet    Your last dose was: Your next dose is: Take 0.6 mg by mouth daily as needed (gout flare). 0.6 mg                 ibuprofen 600 mg tablet  Commonly known as:  MOTRIN    Your last dose was: Your next dose is:           Take 1 Tab by mouth every six (6) hours as needed. Take on full stomach   600 mg                 levothyroxine 88 mcg tablet  Commonly known as:  SYNTHROID    Your last dose was: Your next dose is: Take 88 mcg by mouth Daily (before breakfast). 88 mcg                 melatonin 3 mg tablet    Your last dose was: Your next dose is: Take 1 Tab by mouth nightly as needed. 3 mg                 methadone 10 mg/mL solution  Commonly known as:  DOLOPHINE    Your last dose was: Your next dose is: Take 130 mg by mouth daily. 130 mg                 oxyCODONE-acetaminophen 5-325 mg per tablet  Commonly known as:  PERCOCET    Your last dose was: Your next dose is: Take 1 Tab by mouth every four (4) hours as needed. Max Daily Amount: 6 Tabs. 1 Tab                 senna-docusate 8.6-50 mg per tablet  Commonly known as:  PERICOLACE    Your last dose was: Your next dose is: Take 1 Tab by mouth two (2) times a day. 1 Tab                 tamsulosin 0.4 mg capsule  Commonly known as:  FLOMAX    Your last dose was: Your next dose is: Take 0.4 mg by mouth daily. Indications: bladder cancer   0.4 mg                        STOP taking these medications      CHANTIX 1 mg tablet  Generic drug:  varenicline                  Where to Get Your Medications        Information on where to get these meds will be given to you by the nurse or doctor.     Ask your nurse or doctor about these medications  amLODIPine 10 mg tablet  gabapentin 400 mg capsule  lisinopril 20 mg tablet  sertraline 50 mg tablet       The patient's chart, MAR, and AVS were reviewed by   ASHLEY Platt,   Contact: [phone]

## 2019-05-10 NOTE — PROGRESS NOTES
As we discussed discharge plans today after completion of IV abx later today, patient says he does not feel he is ready as he \"cannot use his foot\". He gets easily agitated as he has demonstrated on multiple occasions in the past.     Patient has been evaluated by PT/OT who have stated pt did not have [facility] needs. Appreciate their re-evaluation today for home safety.    Medically stable for discharge home otherwise

## 2019-05-10 NOTE — PROGRESS NOTES
Occupational therapy note:  Orders placed for OT re-evaluation. Patient received in room, on phone. Per nurse, patient requesting larger width built up handle, as blue one in his room too small. Patient with left hemiparesis. OT presented patient with options for red, essentially the same width as blue, or firm foam block and idea to burrow into foam with utensil handle for increased size. Patient accepting all options but not interested in further activity. Patient awaiting PT to adapt post op shoe for patient to wear. Patient left seated EOB as found when therapist entered room. Per charting patient to bathroom in room when needed. Will complete current orders as patient not interested in OT evaluation. Halina Griffith MS OTR/L

## 2019-05-10 NOTE — PROGRESS NOTES
Problem: Mobility Impaired (Adult and Pediatric)  Goal: *Acute Goals and Plan of Care (Insert Text)  Description  Physical Therapy Goals  Initiated 4/30/2019, Reviewed 5/7/19 and all met except #5. 1.  Patient will move from supine to sit and sit to supine  in bed with modified independence within 7 day(s). 2.  Patient will transfer from bed to chair and chair to bed with modified independence using the least restrictive device within 7 day(s). 3.  Patient will perform sit to stand with modified independence within 7 day(s). 4.  Patient will ambulate with modified independence for 150 feet with the least restrictive device within 7 day(s). 5.  Patient will ascend/descend 2 stairs with 1 handrail(s) with modified independence within 7 day(s). Outcome: Progressing Towards Goal   PHYSICAL THERAPY EVALUATION/DISCHARGE  Patient: Silvia Valadez (59 y.o. male)  Date: 5/10/2019  Primary Diagnosis: Foot infection [L08.9]  Procedure(s) (LRB):  IRRIGATION AND DEBRIDEMENT LEFT FOOT, BIOPSY LEFT FOOT (POP/SAPH BLOCK) (Left) 15 Days Post-Op   Precautions: Fall  (left foot wound infection - non healing ulcers)  ASSESSMENT :  Based on the objective data described below, therapy consulted to perform home safety assessment. Pt stated he lives in a split foyer home with 4-steps to enter with bilateral handrails. There are 6-steps up to bedroom and bathroom with bilateral handrails. Pt stated that he knows how to do the steps using a handrail and his cane; he refused to practice stair training. Pt did c/o his old ortho shoe is what caused his foot ulcer. Each ortho shoe has a hard plastic support the heel and mid-foot area. The hard plastic is covered with cloth materials. Found some padding that could be glued inside the shoe around the heel and mid-foot. Pt tried on the extra large ortho shoe with the new padding and walk approximately 20ft with SC and supervision. Pt states the shoe feels great.  Pt provided instructions to inspect bilateral feet every time he takes off his ortho shoe. Pt continues to be at his baseline modified independent functional level and was provided a new ortho shoe for his left foot; pt cleared to be discharged home and continue witheither HHPT or outpatient. Further skilled acute physical therapy is not indicated at this time. PLAN :  Discharge Recommendations: Home Health vs, Outpatient  Further Equipment Recommendations for Discharge: New extra large ortho shoe with padding glued inside around the heel and mid-foot. SUBJECTIVE:   Patient stated ? I can't wear that shoe; the shoe is what caused my foot ulcer. ?    OBJECTIVE DATA SUMMARY:   HISTORY:    Past Medical History:   Diagnosis Date    Aneurysm ([address]     (with stroke)    Bladder tumor     Cancer ([address]     bladder CA    Chronic pain     Family history of bladder cancer     Gout     History of vascular access device 04/25/2019    [facility] VAT 4 FR MIDLINE R Cephalic Limited access    History of vascular access device 04/26/2019    4 fr Midline right Cephalic midline access    Hypercholesterolemia     Hypertension     Lesion of bladder     Seizures ([address]     Stroke ([address] 2006    left sided weakness    Thromboembolus ([address]     Thyroid disease     TIA (transient ischemic attack) 2012     Past Surgical History:   Procedure Laterality Date    HX ORTHOPAEDIC      R arthroscopy    HX OTHER SURGICAL      x2 L foot    HX UROLOGICAL  04/20/2018    Cystoscopy, TURBT (greater than 5 cm resected) Dr. Mattie Magaña, [facility].     KNEE ARTHROSCP HARV      left knee    TRANSURETHRAL RESEC BLADDER NECK  08/10/2018     Prior Level of Function/Home Situation: modified independent  Personal factors and/or comorbidities impacting plan of care:     Home Situation  Home Environment: Private residence  # Steps to Enter: 4  Rails to Enter: Yes  Hand Rails : Bilateral  Wheelchair Ramp: No  One/Two Story Residence: Two story  # of Interior Steps: [address]: Both  Lift Chair Available: No  Living Alone: No  Support Systems: Friends \ neighbors  Patient Expects to be Discharged to[de-identified] Private residence  Current DME Used/Available at Home: [employer], straight  Tub or Shower Type: Shower    EXAMINATION/PRESENTATION/DECISION MAKING:   Critical Behavior:  Neurologic State: Alert  Orientation Level: Oriented X4  Cognition: Follows commands  Safety/Judgement: Awareness of environment, Fall prevention, Decreased insight into deficits, Decreased awareness of need for safety  Hearing: Auditory  Auditory Impairment: None  Skin:  left foot ulcer bandage id C,D & I  Edema: no edema noted  Range Of Motion:      Generally decreased, functional                    Strength:           Generally decreased, functional              Tone & Sensation:       Not tested                           Coordination:     Generally decreased, functional    Vision:      Functional Mobility:  Bed Mobility:  Rolling: Modified independent  Supine to Sit: Modified independent  Sit to Supine: Modified independent  Scooting: Modified independent  Transfers:  Sit to Stand: Modified independent  Stand to Sit: Modified independent     Stand Pivot Transfers: Modified independent  Bed to Chair: Modified independent              Balance:   Sitting: Impaired; Without support  Sitting - Static: Good (unsupported)  Sitting - Dynamic: Fair (occasional)  Standing: Impaired; With support  Standing - Static: Fair  Standing - Dynamic : Fair  Ambulation/Gait Training:  Distance (ft): 25 Feet (ft)  Assistive Device: Gait belt;Cane, straight  Ambulation - Level of Assistance: Modified independent        Gait Abnormalities: Antalgic;Hemiplegic        Base of Support: Center of gravity altered;Shift to right     Speed/Reva: Fluctuations  Step Length: Left shortened;Right shortened                         Stairs:     Stairs - Level of Assistance: (Refused to do stairs; stated he knows how to do steps)         Therapeutic Exercises: Not done during this Rx session    Functional Measure:  Barthel Index:    Bathin  Bladder: 10  Bowels: 10  Groomin  Dressin  Feeding: 10  Mobility: 10  Stairs: 0  Toilet Use: 5  Transfer (Bed to Chair and Back): 10  Total: 70/100       Percentage of impairment   0%   1-19%   20-39%   40-59%   60-79%   80-99%   100%   Barthel Score 0-100 100 99-80 79-60 59-40 20-39 1-19   0     The Barthel ADL Index: Guidelines  1. The index should be used as a record of what a patient does, not as a record of what a patient could do. 2. The main aim is to establish degree of independence from any help, physical or verbal, however minor and for whatever reason. 3. The need for supervision renders the patient not independent. 4. A patient's performance should be established using the best available evidence. Asking the patient, friends/relatives and nurses are the usual sources, but direct observation and common sense are also important. However direct testing is not needed. 5. Usually the patient's performance over the preceding 24-48 hours is important, but occasionally longer periods will be relevant. 6. Middle categories imply that the patient supplies over 50 per cent of the effort. 7. Use of aids to be independent is allowed. Ney Álvarez., Barthel, D.W. (1721). Functional evaluation: the Barthel Index. [facility] (14)2. ISAI Gutiérrez, Delicia Forbes., Manfred Richmond.[facility], [address] [place] [address] (). Measuring the change indisability after inpatient rehabilitation; comparison of the responsiveness of the Barthel Index and Functional Baker Measure. Journal of Neurology, Neurosurgery, and Psychiatry, 66(4), 628-671. Gisselle Khanna, N.J.A, Honey Geiger,  SHARRI.J.M, & Annamaria Fuller MArnulfoA. (2004.) Assessment of post-stroke quality of life in cost-effectiveness studies: The usefulness of the Barthel Index and the EuroQoL-5D.  Quality of Life Research, 15, 830-81           Physical Therapy Evaluation Charge Determination   History Examination Presentation Decision-Making   MEDIUM  Complexity : 1-2 comorbidities / personal factors will impact the outcome/ POC  MEDIUM Complexity : 3 Standardized tests and measures addressing body structure, function, activity limitation and / or participation in recreation  HIGH Complexity : Unstable and unpredictable characteristics  MEDIUM Complexity : FOTO score of 26-74      Based on the above components, the patient evaluation is determined to be of the following complexity level: MEDIUM    Pain:  Pain Scale 1: Numeric (0 - 10)  Pain Intensity 1: 8  Pain Location 1: Foot  Activity Tolerance:   Pt was told by MD to stay off of left foot but he ambulated x 20ft with new ortho shoe with padding. Pt refused to do stairs stating he already know how to go up/down the steps using the handrail and his SC. \  Please refer to the flowsheet for vital signs taken during this treatment. After treatment:   ?   Patient left in no apparent distress sitting up in chair  ? Patient left in no apparent distress in bed  ? Call bell left within reach  ? Nursing notified  ? Caregiver present  ? Bed alarm activated    COMMUNICATION/EDUCATION:   Communication/Collaboration:  ?   Fall prevention education was provided and the patient/caregiver indicated understanding. ? Patient/family have participated as able and agree with findings and recommendations. ?   Patient is unable to participate in plan of care at this time.   Findings and recommendations were discussed with: Occupational Therapist and Registered Nurse    Thank you for this referral.  Vamshi Murphy, PT   Time Calculation: 42 mins

## 2019-05-10 NOTE — PROGRESS NOTES
Patient given discharge instructions and prescriptions. Time for questions and clarification provided. 's cab transport setup for 7:30pm.  Patient in no acute distress and will be wheeled out by PCT and discharged to home.

## 2019-05-10 NOTE — PHYSICIAN ADVISORY
Peer to peer review setup for Dr. Sabrina Jimenez MD MPH [facility] [address]  [phone]      [phone]

## 2019-05-20 ENCOUNTER — HOSPITAL ENCOUNTER (INPATIENT)
Age: 54
LOS: 7 days | Discharge: HOME | DRG: 753 | End: 2019-05-27
Attending: PSYCHIATRY & NEUROLOGY | Admitting: PSYCHIATRY & NEUROLOGY
Payer: MEDICAID

## 2019-05-20 ENCOUNTER — APPOINTMENT (OUTPATIENT)
Dept: CT IMAGING | Age: 54
End: 2019-05-20
Attending: EMERGENCY MEDICINE
Payer: MEDICAID

## 2019-05-20 ENCOUNTER — HOSPITAL ENCOUNTER (EMERGENCY)
Age: 54
Discharge: OTHER INSTITUTION | End: 2019-05-20
Attending: EMERGENCY MEDICINE
Payer: MEDICAID

## 2019-05-20 VITALS
HEIGHT: 73 IN | WEIGHT: 240 LBS | SYSTOLIC BLOOD PRESSURE: 98 MMHG | OXYGEN SATURATION: 99 % | BODY MASS INDEX: 31.81 KG/M2 | RESPIRATION RATE: 16 BRPM | HEART RATE: 76 BPM | TEMPERATURE: 98 F | DIASTOLIC BLOOD PRESSURE: 63 MMHG

## 2019-05-20 DIAGNOSIS — F10.10 ALCOHOL ABUSE: ICD-10-CM

## 2019-05-20 DIAGNOSIS — N17.9 AKI (ACUTE KIDNEY INJURY) (HCC): ICD-10-CM

## 2019-05-20 DIAGNOSIS — F32.A DEPRESSION, UNSPECIFIED DEPRESSION TYPE: Primary | ICD-10-CM

## 2019-05-20 DIAGNOSIS — R45.851 SUICIDAL IDEATION: ICD-10-CM

## 2019-05-20 LAB
ALBUMIN SERPL-MCNC: 3.7 G/DL (ref 3.5–5)
ALBUMIN/GLOB SERPL: 1 {RATIO} (ref 1.1–2.2)
ALP SERPL-CCNC: 124 U/L (ref 45–117)
ALT SERPL-CCNC: 36 U/L (ref 12–78)
AMPHET UR QL SCN: NEGATIVE
ANION GAP SERPL CALC-SCNC: 11 MMOL/L (ref 5–15)
APAP SERPL-MCNC: <2 UG/ML (ref 10–30)
APPEARANCE UR: CLEAR
AST SERPL-CCNC: 38 U/L (ref 15–37)
BACTERIA URNS QL MICRO: NEGATIVE /HPF
BARBITURATES UR QL SCN: NEGATIVE
BASOPHILS # BLD: 0.1 K/UL (ref 0–0.1)
BASOPHILS NFR BLD: 0 % (ref 0–1)
BENZODIAZ UR QL: NEGATIVE
BILIRUB SERPL-MCNC: 0.3 MG/DL (ref 0.2–1)
BILIRUB UR QL: NEGATIVE
BUN SERPL-MCNC: 6 MG/DL (ref 6–20)
BUN/CREAT SERPL: 4 (ref 12–20)
CALCIUM SERPL-MCNC: 8.4 MG/DL (ref 8.5–10.1)
CANNABINOIDS UR QL SCN: NEGATIVE
CHLORIDE SERPL-SCNC: 102 MMOL/L (ref 97–108)
CO2 SERPL-SCNC: 22 MMOL/L (ref 21–32)
COCAINE UR QL SCN: NEGATIVE
COLOR UR: NORMAL
COMMENT, HOLDF: NORMAL
CREAT SERPL-MCNC: 1.42 MG/DL (ref 0.7–1.3)
DIFFERENTIAL METHOD BLD: ABNORMAL
DRUG SCRN COMMENT,DRGCM: ABNORMAL
EOSINOPHIL # BLD: 0.7 K/UL (ref 0–0.4)
EOSINOPHIL NFR BLD: 5 % (ref 0–7)
EPITH CASTS URNS QL MICRO: NORMAL /LPF
ERYTHROCYTE [DISTWIDTH] IN BLOOD BY AUTOMATED COUNT: 14.6 % (ref 11.5–14.5)
ETHANOL SERPL-MCNC: 24 MG/DL
GLOBULIN SER CALC-MCNC: 3.7 G/DL (ref 2–4)
GLUCOSE SERPL-MCNC: 97 MG/DL (ref 65–100)
GLUCOSE UR STRIP.AUTO-MCNC: NEGATIVE MG/DL
HCT VFR BLD AUTO: 37.3 % (ref 36.6–50.3)
HGB BLD-MCNC: 12.1 G/DL (ref 12.1–17)
HGB UR QL STRIP: NEGATIVE
HYALINE CASTS URNS QL MICRO: NORMAL /LPF (ref 0–5)
IMM GRANULOCYTES # BLD AUTO: 0.1 K/UL (ref 0–0.04)
IMM GRANULOCYTES NFR BLD AUTO: 1 % (ref 0–0.5)
KETONES UR QL STRIP.AUTO: NEGATIVE MG/DL
LEUKOCYTE ESTERASE UR QL STRIP.AUTO: NEGATIVE
LYMPHOCYTES # BLD: 4.1 K/UL (ref 0.8–3.5)
LYMPHOCYTES NFR BLD: 30 % (ref 12–49)
MCH RBC QN AUTO: 28.5 PG (ref 26–34)
MCHC RBC AUTO-ENTMCNC: 32.4 G/DL (ref 30–36.5)
MCV RBC AUTO: 87.8 FL (ref 80–99)
METHADONE UR QL: POSITIVE
MONOCYTES # BLD: 1.1 K/UL (ref 0–1)
MONOCYTES NFR BLD: 8 % (ref 5–13)
NEUTS SEG # BLD: 7.6 K/UL (ref 1.8–8)
NEUTS SEG NFR BLD: 56 % (ref 32–75)
NITRITE UR QL STRIP.AUTO: NEGATIVE
NRBC # BLD: 0 K/UL (ref 0–0.01)
NRBC BLD-RTO: 0 PER 100 WBC
OPIATES UR QL: NEGATIVE
PCP UR QL: NEGATIVE
PH UR STRIP: 5.5 [PH] (ref 5–8)
PLATELET # BLD AUTO: 304 K/UL (ref 150–400)
PMV BLD AUTO: 10.7 FL (ref 8.9–12.9)
POTASSIUM SERPL-SCNC: 3.9 MMOL/L (ref 3.5–5.1)
PROT SERPL-MCNC: 7.4 G/DL (ref 6.4–8.2)
PROT UR STRIP-MCNC: NEGATIVE MG/DL
RBC # BLD AUTO: 4.25 M/UL (ref 4.1–5.7)
RBC #/AREA URNS HPF: NORMAL /HPF (ref 0–5)
SALICYLATES SERPL-MCNC: 2.5 MG/DL (ref 2.8–20)
SAMPLES BEING HELD,HOLD: NORMAL
SODIUM SERPL-SCNC: 135 MMOL/L (ref 136–145)
SP GR UR REFRACTOMETRY: 1.01 (ref 1–1.03)
UA: UC IF INDICATED,UAUC: NORMAL
UROBILINOGEN UR QL STRIP.AUTO: 0.2 EU/DL (ref 0.2–1)
WBC # BLD AUTO: 13.5 K/UL (ref 4.1–11.1)
WBC URNS QL MICRO: NORMAL /HPF (ref 0–4)

## 2019-05-20 PROCEDURE — 65220000003 HC RM SEMIPRIVATE PSYCH

## 2019-05-20 PROCEDURE — 80307 DRUG TEST PRSMV CHEM ANLYZR: CPT

## 2019-05-20 PROCEDURE — 99285 EMERGENCY DEPT VISIT HI MDM: CPT

## 2019-05-20 PROCEDURE — 80053 COMPREHEN METABOLIC PANEL: CPT

## 2019-05-20 PROCEDURE — 36415 COLL VENOUS BLD VENIPUNCTURE: CPT

## 2019-05-20 PROCEDURE — 96361 HYDRATE IV INFUSION ADD-ON: CPT

## 2019-05-20 PROCEDURE — 85025 COMPLETE CBC W/AUTO DIFF WBC: CPT

## 2019-05-20 PROCEDURE — 70450 CT HEAD/BRAIN W/O DYE: CPT

## 2019-05-20 PROCEDURE — 96360 HYDRATION IV INFUSION INIT: CPT

## 2019-05-20 PROCEDURE — 74011250637 HC RX REV CODE- 250/637: Performed by: EMERGENCY MEDICINE

## 2019-05-20 PROCEDURE — 74011250636 HC RX REV CODE- 250/636: Performed by: EMERGENCY MEDICINE

## 2019-05-20 PROCEDURE — 81001 URINALYSIS AUTO W/SCOPE: CPT

## 2019-05-20 PROCEDURE — 74011250637 HC RX REV CODE- 250/637: Performed by: NURSE PRACTITIONER

## 2019-05-20 RX ORDER — TRAZODONE HYDROCHLORIDE 50 MG/1
50 TABLET ORAL
Status: DISCONTINUED | OUTPATIENT
Start: 2019-05-20 | End: 2019-05-27 | Stop reason: HOSPADM

## 2019-05-20 RX ORDER — HYDROXYZINE 50 MG/1
50 TABLET, FILM COATED ORAL
Status: DISCONTINUED | OUTPATIENT
Start: 2019-05-20 | End: 2019-05-27 | Stop reason: HOSPADM

## 2019-05-20 RX ORDER — BENZTROPINE MESYLATE 1 MG/ML
2 INJECTION INTRAMUSCULAR; INTRAVENOUS
Status: DISCONTINUED | OUTPATIENT
Start: 2019-05-20 | End: 2019-05-27 | Stop reason: HOSPADM

## 2019-05-20 RX ORDER — ADHESIVE BANDAGE
30 BANDAGE TOPICAL DAILY PRN
Status: DISCONTINUED | OUTPATIENT
Start: 2019-05-20 | End: 2019-05-27 | Stop reason: HOSPADM

## 2019-05-20 RX ORDER — OLANZAPINE 5 MG/1
5 TABLET ORAL
Status: DISCONTINUED | OUTPATIENT
Start: 2019-05-20 | End: 2019-05-27 | Stop reason: HOSPADM

## 2019-05-20 RX ORDER — IBUPROFEN 400 MG/1
400 TABLET ORAL
Status: DISCONTINUED | OUTPATIENT
Start: 2019-05-20 | End: 2019-05-21

## 2019-05-20 RX ORDER — IBUPROFEN 200 MG
1 TABLET ORAL
Status: DISCONTINUED | OUTPATIENT
Start: 2019-05-20 | End: 2019-05-27 | Stop reason: HOSPADM

## 2019-05-20 RX ORDER — SERTRALINE HYDROCHLORIDE 50 MG/1
50 TABLET, FILM COATED ORAL DAILY
Status: ON HOLD | COMMUNITY
End: 2019-05-23 | Stop reason: SDUPTHER

## 2019-05-20 RX ORDER — AMOXICILLIN 250 MG
1 CAPSULE ORAL DAILY
Status: ON HOLD | COMMUNITY
End: 2022-10-21

## 2019-05-20 RX ORDER — METHADONE HYDROCHLORIDE 10 MG/ML
130 CONCENTRATE ORAL
Status: COMPLETED | OUTPATIENT
Start: 2019-05-20 | End: 2019-05-20

## 2019-05-20 RX ORDER — BENZTROPINE MESYLATE 2 MG/1
2 TABLET ORAL
Status: DISCONTINUED | OUTPATIENT
Start: 2019-05-20 | End: 2019-05-27 | Stop reason: HOSPADM

## 2019-05-20 RX ORDER — ACETAMINOPHEN 325 MG/1
650 TABLET ORAL
Status: DISCONTINUED | OUTPATIENT
Start: 2019-05-20 | End: 2019-05-27 | Stop reason: HOSPADM

## 2019-05-20 RX ADMIN — SODIUM CHLORIDE 1000 ML: 900 INJECTION, SOLUTION INTRAVENOUS at 07:09

## 2019-05-20 RX ADMIN — ACETAMINOPHEN 650 MG: 325 TABLET ORAL at 20:27

## 2019-05-20 RX ADMIN — TRAZODONE HYDROCHLORIDE 50 MG: 50 TABLET ORAL at 21:57

## 2019-05-20 RX ADMIN — METHADONE HYDROCHLORIDE 130 MG: 10 CONCENTRATE ORAL at 11:27

## 2019-05-20 RX ADMIN — HYDROXYZINE HYDROCHLORIDE 50 MG: 50 TABLET, FILM COATED ORAL at 21:57

## 2019-05-20 RX ADMIN — IBUPROFEN 400 MG: 400 TABLET ORAL at 18:08

## 2019-05-20 NOTE — ED NOTES
Irlanda Velazquez is on call. States she will contact a coworker to see if they can evaluate pt at bedside or via Tele consult.

## 2019-05-20 NOTE — ED NOTES
from  [facility] coming to evaluate transport decision with [facility] without police custody. [facility] transport cancelled until evaluation can occur.

## 2019-05-20 NOTE — ED PROVIDER NOTES
EMERGENCY DEPARTMENT HISTORY AND PHYSICAL EXAM      Date: 5/20/2019  Patient Name: Makenna Maria    History of Presenting Illness     Chief Complaint   Patient presents with    Foot Pain     pt had recent left foot sx; pt reports he broke it, but now he is having issues with wounds now; EMS was called by police because pt was found drunk in a parking lot; pt reports drinking last night; pt reports he had an argument with someone that would not pay him for fixing his [facility]    Alcohol Problem       History Provided By: Patient    HPI: Makenna Maria, 46 y.o. male with PMHx as noted below presents the emergency department with chief complaint of depression and suicidal ideation. Patient states that yesterday evening he had had a few drinks and had fallen. Patient states that a bystander had called EMS because he was unable to get up. Patient cites the boot on his left foot is the reason for the fall and the reason he was unable to get up. With regards to his foot pain, he has had chronic wounds on that foot that is being managed by podiatry. He follows up with them weekly and has been compliant with medications. Patient requesting that we do not remove the wrapping or the boot at this time. Patient states he is here primarily for psychiatric evaluation as he has been feeling depressed and having passive suicidal thoughts. He denies any intentional overdoses or attempts at self-harm. Also denied any other injuries from the fall.       PCP: Kaya Machado MD    Facility-Administered Medications Ordered in Other Encounters   Medication Dose Route Frequency Provider Last Rate Last Dose    oxyCODONE-acetaminophen (PERCOCET) 5-325 mg per tablet 2 Tab  2 Tab Oral Q6H PRN Avery Armstrong MD   2 Tab at 05/21/19 2105    [START ON 5/22/2019] allopurinol (ZYLOPRIM) tablet 100 mg  100 mg Oral DAILY Avery Armstrong MD        [START ON 5/22/2019] amLODIPine (NORVASC) tablet 10 mg  10 mg Oral DAILY Mitcheal Frankel, MD  [START ON 5/22/2019] levothyroxine (SYNTHROID) tablet 88 mcg  88 mcg Oral ACB Avery Armstrong MD        melatonin tablet 3 mg  3 mg Oral QHS PRN Avery Armstrong MD        [START ON 5/22/2019] senna-docusate (PERICOLACE) 8.6-50 mg per tablet 1 Tab  1 Tab Oral DAILY Avery Armstrong MD        [START ON 5/22/2019] sertraline (ZOLOFT) tablet 50 mg  50 mg Oral DAILY Avery Armstorng MD        [START ON 5/22/2019] tamsulosin (FLOMAX) capsule 0.4 mg  0.4 mg Oral DAILY Avery Armstrong MD        [START ON 5/22/2019] methadone (DOLOPHINE) 5 mg/5 mL oral solution 130 mg  130 mg Oral DAILY Kendall Wick MD        ziprasidone (GEODON) 20 mg in sterile water (preservative free) 1 mL injection  20 mg IntraMUSCular BID PRN Trisha Maguire NP        OLANZapine (ZyPREXA) tablet 5 mg  5 mg Oral Q6H PRN Trisha Maguire NP        benztropine (COGENTIN) tablet 2 mg  2 mg Oral BID PRN Trisha Maguire NP        benztropine (COGENTIN) injection 2 mg  2 mg IntraMUSCular BID PRN Trisha Maguire NP        acetaminophen (TYLENOL) tablet 650 mg  650 mg Oral Q4H PRN Trisha Maguire NP   650 mg at 05/21/19 1750    magnesium hydroxide (MILK OF MAGNESIA) 400 mg/5 mL oral suspension 30 mL  30 mL Oral DAILY PRN Trisha Maguire NP        nicotine (NICODERM CQ) 21 mg/24 hr patch 1 Patch  1 Patch TransDERmal DAILY PRN Trisha Maguire NP        traZODone (DESYREL) tablet 50 mg  50 mg Oral QHS PRN Trisha Maguire NP   50 mg at 05/21/19 2105    hydrOXYzine HCl (ATARAX) tablet 50 mg  50 mg Oral Q6H PRN Eden EDOUARD NP   50 mg at 05/20/19 2157       Past History     Past Medical History:  Past Medical History:   Diagnosis Date    Aneurysm ([address]     (with stroke)    Bladder tumor     [facility]     bladder CA    Chronic pain     Family history of bladder cancer     Gout     History of vascular access device 04/25/2019    [facility] VAT 4 FR MIDLINE R Cephalic Limited access    History of vascular access device 04/26/2019    4 fr Midline right Cephalic midline access    Hypercholesterolemia     Hypertension     Lesion of bladder     [facility]     Stroke [facility]) 2006    left sided weakness    Thromboembolus ([facility]     Thyroid disease     TIA (transient ischemic attack) 2012       Past Surgical History:  Past Surgical History:   Procedure Laterality Date    HX ORTHOPAEDIC      R arthroscopy    HX OTHER SURGICAL      x2 L foot    HX UROLOGICAL  04/20/2018    Cystoscopy, TURBT (greater than 5 cm resected) Dr. Reggie Ochoa, [facility].  KNEE ARTHROSCP HARV      left knee    TRANSURETHRAL RESEC BLADDER NECK  08/10/2018       Family History:  Family History   Problem Relation Age of Onset    Diabetes Father     Lung Disease Father     Diabetes Sister     Diabetes Brother     Cancer Paternal Grandfather         Bladder       Social History:  Social History     Tobacco Use    Smoking status: Current Every Day Smoker     Packs/day: 1.00    Smokeless tobacco: Never Used   Substance Use Topics    Alcohol use: Yes     Alcohol/week: 8.4 oz     Types: 14 Cans of beer per week     Comment: occasional    Drug use: Yes     Types: Prescription       Allergies: Allergies   Allergen Reactions    Sulfamethoxazole-Trimethoprim Rash     L eye and L hand    Ciprofloxacin Hives     Per pcp records    Codeine Hives     Tolerates dilaudid, oxycodone    Cymbalta [Duloxetine] Other (comments)     Confusion and memory loss    Gabapentin Hives and Diarrhea    Lyrica [Pregabalin] Hives    Sulfa (Sulfonamide Antibiotics) Rash    Ultram [Tramadol] Hives    Vancomycin Shortness of Breath         Review of Systems   Review of Systems  Constitutional: Negative for fever, chills, and fatigue. HENT: Negative for congestion, sore throat, rhinorrhea, sneezing and neck stiffness   Eyes: Negative for discharge and redness.    Respiratory: Negative for  shortness of breath, wheezing   Cardiovascular: Negative for chest pain, palpitations   Gastrointestinal: Negative for nausea, vomiting, abdominal pain, constipation, diarrhea and blood in stool. Genitourinary: Negative for dysuria, hematuria, flank pain, decreased urine volume, discharge,   Musculoskeletal: Negative for myalgias or joint pain . Skin: Negative for rash or lesions . Neurological: Negative weakness, light-headedness, numbness and headaches. Physical Exam   Physical Exam    GENERAL: alert and oriented, no acute distress  EYES: PEERL, No injection, discharge or icterus. ENT: Mucous membranes pink and moist.  NECK: Supple  LUNGS: Airway patent. Non-labored respirations. Breath sounds clear with good air entry bilaterally. HEART: Regular rate and rhythm. No peripheral edema  ABDOMEN: Non-distended and non-tender, without guarding or rebound. SKIN:  warm, dry  MSK/EXTREMITIES: There is a dressing on the left foot with a postoperative shoe  NEUROLOGICAL: Alert, oriented      Diagnostic Study Results     Labs -     Reviewed  Radiologic Studies -   CT HEAD WO CONT   Final Result   IMPRESSION:  No acute intracranial abnormalities. No change since 2/7/2013. CT Results  (Last 48 hours)               05/20/19 0813  CT HEAD WO CONT Final result    Impression:  IMPRESSION:  No acute intracranial abnormalities. No change since 2/7/2013. Narrative:  EXAMINATION:  CT HEAD WO CONT       CLINICAL INFORMATION:  fall   COMPARISON:  2/7/2013    TECHNIQUE: Routine axial head CT was performed. IV contrast was not   administered. Sagittal and coronal reconstructions were generated. CT dose reduction was achieved through use of a standardized protocol tailored   for this examination and automatic exposure control for dose modulation. FINDINGS:   No acute infarct, hemorrhage or mass. VENTRICULAR SYSTEM:  Unchanged ventricular size, without hydrocephalus.    Unchanged ventricular asymmetry with mild to moderate enlargement of the right   lateral ventricle. BASAL CISTERNS:  Patent. BRAIN PARENCHYMA:  Volume loss in the right hemisphere and probable chronic   infarct in the right inferior frontal lobe, unchanged. MIDLINE SHIFT:  None. CALVARIUM/ SKULL BASE: Intact. Left parietal scalp laceration and swelling. PARANASAL SINUSES AND MASTOID AIR CELLS: Clear. VISUALIZED ORBITS: No significant abnormalities. SELLA: No enlargement. CXR Results  (Last 48 hours)    None            Medical Decision Making   I am the first provider for this patient. I reviewed the vital signs, available nursing notes, past medical history, past surgical history, family history and social history. Vital Signs-Reviewed the patient's vital signs. Records Reviewed: Nursing Notes and Old Medical Records    Provider Notes (Medical Decision Making): On presentation, the patient is well appearing, in no acute distress with normal vital signs. Based on my history and exam the differential diagnosis for this patient includes alcohol intoxication, suicidal ideation, sepsis. Ideally I would like to have examined the patient's foot however he refused to let me remove the dressing stating that podiatry has instructed him to keep the dressing on the foot no matter what. Patient does say overall the symptoms seem to be improving and he is having no systemic symptoms concerning for worsening infection at this time. Remainder patient's work-up notable for an acute kidney injury, may be prerenal.  Patient expressed myself suicidal thoughts so MALLORY Alejo was consulted. Patient initially was voluntary however then attempted to refuse so was placed under TDO for psychiatric admission. ED Course:   Initial assessment performed. The patients presenting problems have been discussed, and they are in agreement with the care plan formulated and outlined with them.   I have encouraged them to ask questions as they arise throughout their visit.      PROGRESS NOTE:  The patient has been re-evaluated. Reviewed available results with patient and have counseled them on diagnosis and care plan. They have expressed understanding, and all their questions have been answered. They agree with plan and agree. Valentin Orozco MD    Transfer Note:   Patient is being transferred to [facility]. Transfer was accepted by psychiatrist. The reasons for their transfer have been discussed with them and available family. They convey agreement and understanding for the need to be transferred as explained to them by me. Disposition:  Transfer    PLAN:  1. Transfer to psych    Diagnosis     Clinical Impression:   1. Depression, unspecified depression type    2. Alcohol abuse    3. Suicidal ideation    4.  EARL (acute kidney injury) ([facility]

## 2019-05-20 NOTE — BSMART NOTE
Comprehensive Assessment Form Part 1      Section I - Disposition    Axis I - Major Depressive Disorder vs Substance Induced Mood Disorder   Alcohol Use Disorder   R/O Malingering  Axis II - Deferred, R/O Personality Disorder Unspecified  Axis III -   Past Medical History:   Diagnosis Date    Aneurysm ([address]     (with stroke)    Bladder tumor     Cancer ([address]     bladder CA    Chronic pain     Family history of bladder cancer     Gout     History of vascular access device 04/25/2019    [facility] VAT 4 FR MIDLINE R Cephalic Limited access    History of vascular access device 04/26/2019    4 fr Midline right Cephalic midline access    Hypercholesterolemia     Hypertension     Lesion of bladder     Seizures ([address]     Stroke ([address] 2006    left sided weakness    Thromboembolus ([address]     Thyroid disease     TIA (transient ischemic attack) 2012   Axis IV - Housing, lack of mental health providers, lack of structure, chronic medical issues, chronic pain  [place] V - 40      The Medical Doctor to Psychiatrist conference was not completed. The Medical Doctor is in agreement with Psychiatrist disposition because of (reason) patient is a voluntary admission. The plan is admission to [facility] [place]. The on-call Psychiatrist consulted was Praneeth Martinez NP. The admitting Psychiatrist will be Abdiel CABRERA. The admitting Diagnosis is Major Depressive Disorder vs Substance Induced Mood Disorder. The Payor source is Medicaid. Section II - Integrated Summary  Summary:  Patient is a 49yo male who presents to ER due to foot pain, alcohol problem and suicidal ideation. He reports that he was helping a friend by fixing their [facility] and they refused to pay him. He reports that he fell due to his foot and was in pain because of this. He reports that he has 2 drinks last night and denies having any other substances. Patient reports that he feels suicidal due to people taking advantage of him and using him but not caring about him.  He reports he has no reason to live and has a plan to jump off a bridge or run into traffic. He had also reported a plan to shoot himself with a gun if he could get one. Patient reports that he is taking the Zoloft that the physician prescribed him when he was last admitted medically but reports that it is not working and he has not followed up with outpatient care. He reports that he is living with a friend for the last few months but that this is not a good situation and he feels stuck. He reports that he has had the tumor in his bladder removed and has a follow up appointment at the end of the month. He reports surgery to his foot due to the chronic pain and not healing correctly from an injury last year. He reports wound care once a week and that the compression bandage is changed and he was informed things are healing well. Patient reports that he has no support and he does not feel safe leaving. He reports he does not feel he can keep himself safe. He reports following up with [facility] at one point but they did not help him so he has no outpatient mental health providers. He reports no one can help him so he wants to die. He denies being able to feel safe and remain safe if he was discharged straight to [facility] or the [facility]. He denies homicidal ideation and hallucinations. He is sleepy and needed to be woken up during assessment multiple times by this writer and the sitter who was observing the patient. He denies ability to safety plan for discharge. Plan will be to seek admission due to lack of safety plan and multiple reports of ways he plans to harm himself. Patient was last admitted to [facility] [place] August 2018. He was assessed by this writer and reported at that time he had multiple plans and would \"blow my brains out\" as the main plan. He denies any long term follow up. Per chart review he has also utilized ER's 13 times for various complaints relating to medical issues and alcohol.     The patienthas demonstrated mental capacity to provide informed consent. The information is given by the patient and past medical records. The Chief Complaint is suicidal ideation. The Precipitant Factors are psychosocial, chronic medical, chronic pain. Previous Hospitalizations: yes  The patient has not previously been in restraints. Current Psychiatrist and/or  is None. Lethality Assessment:    The potential for suicide noted by the following: vague plan, ideation, means and current substance abuse . The potential for homicide is not noted. The patient has not been a perpetrator of sexual or physical abuse. There are not pending charges. The patient is felt to be at risk for self harm or harm to others. The attending nurse was advised to remove potentially harmful or dangerous items from the patient's room , to remove patient clothing and place it out of immediate access to the patient, to request a TDO assessment, the patient needs supervision and that security has been notified. Section III - Psychosocial  The patient's overall mood and attitude is depressed. Feelings of helplessness and hopelessness are observed by verbal report. Generalized anxiety is not observed. Panic is not observed. Phobias are not observed. Obsessive compulsive tendencies are not observed. Section IV - Mental Status Exam  The patient's appearance shows no evidence of impairment. The patient's behavior shows no evidence of impairment. The patient is oriented to time, place, person and situation. The patient's speech is soft and is slurred. The patient's mood is depressed and is sad. The range of affect is flat. The patient's thought content demonstrates no evidence of impairment. The thought process shows no evidence of impairment. The patient's perception shows no evidence of impairment. The patient's memory shows no evidence of impairment. The patient's appetite shows no evidence of impairment.   The patient's sleep shows no evidence of impairment. The patient's insight is blaming and The patient shows no insight. The patient's judgement shows no evidence of impairment. Section V - Substance Abuse  The patient is using substances. The patient is using alcohol for unknown with last use on last night. The patient has experienced the following withdrawal symptoms: cravings. Section VI - Living Arrangements  The patient is single. The patient lives with a friend. The patient does plan to return home upon discharge. The patient does not have legal issues pending. The patient's source of income comes from social security. Rastafarian and cultural practices have not been voiced at this time. The patient's greatest support comes from no one and this person will not be involved with the treatment. The patient has not been in an event described as horrible or outside the realm of ordinary life experience either currently or in the past.  The patient has not been a victim of sexual/physical abuse. Section VII - Other Areas of Clinical Concern  The highest grade achieved is not assessed with the overall quality of school experience being described as not assessed. The patient is currently disabled and speaks [place] as a primary language. The patient has no communication impairments affecting communication. The patient's preference for learning can be described as: can read and write adequately.   The patient's hearing is normal.  The patient's vision is normal.      Alexandria Ovalle [facility]

## 2019-05-20 NOTE — PROGRESS NOTES
Pharmacy Clarification of Prior to Admission Medication Regimen-Follow Up Needed    The patient was unable to participate in interview regarding clarification of the prior to admission medication regimen. Pharmacy attempted to clarify the prior to admission medication regimen for the patient, but was unsuccessful after multiple attempts. The following resources were used to facilitate this attempt:  MHT called [facility] counseling [phone] and spoke with [employer] Monge who was able to verify the patient's Methadone dose. MHT attempted to interview patient but patient stated that he wanted to sleep and not talk. MHT thanked patient and walked out. The medication history will need to be re-evaluated at a later time during admission when patient is willing/able to participate or if more information is provided.     Thank you,  Matty Hiltoner  Medication History Pharmacy Technician

## 2019-05-20 NOTE — BH NOTES
TRANSFER - IN REPORT:    Verbal report received from [address] [place] [address] Dr. binta Knox  being received from  [facility] er (unit) for routine progression of care      Report consisted of patients Situation, Background, Assessment and   Recommendations(SBAR). Information from the following report(s) SBAR was reviewed with the receiving nurse. Opportunity for questions and clarification was provided. Assessment completed upon patients arrival to unit and care assumed.

## 2019-05-20 NOTE — ED NOTES
\"Pt states he has a plan to jump in front of traffic or use a gun\". Pt agrees not to harm himself while here. Requested his methadone dose.

## 2019-05-20 NOTE — ED NOTES
Notified BSMART that pt still refusing to be transferred to [facility]. MD made aware. [facility] [place] crisis at bedside to evaluate pt. Pt states \" I told a lie, I want to go home now. 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 \" Pt on cell phone contacted a ride. Discussed with pt that he would need to be evaluated for suicidal ideation and involuntary admission. Pt states he made up the suicide and denies that he stated he has a plan. RN had tech at bedside who witnessed statement.

## 2019-05-20 NOTE — ED NOTES
Spoke with officer regarding Transport. Pt has TDO written for alternative transport. Contacted [facility] for re-transport.

## 2019-05-20 NOTE — ED NOTES
Pt now expressing SI with plan to jump off of a bridge. Pt denies HI. SAD score 6. Sitter with pt in triage 2 while awaiting room placement.

## 2019-05-20 NOTE — ED NOTES
[facility] Arrived. Pt states he would not sign release forms to go to [facility] until he receives his methadone.  Notified MD.

## 2019-05-20 NOTE — ED NOTES
Pt falling asleep with short apneic periods in triage. Pt also now reporting he fell when the police came, resulting in abrasions to left arm. Hx stroke, residual weakness left side.

## 2019-05-20 NOTE — BSMART NOTE
Received call from primary nurse that patient was voluntary for admission to [facility] [place] but refused to be transported until he had been given his methadone. She reports once he was dosed he refused admission stating he wanted to go home. Based on patient informing this writer that he has 2 plans for killing himself if he left and that he did not feel safe going home and does not have a support system and the nurse reporting patient reported an additional way he planned to kill himself, it was determined a TDO assessment would be necessary. Spoke with the deputy in the ER who will ECO the patient if needed due to suicidal ideation with plan. [facility] will see patient shortly regarding TDO assessment. Informed Mechelle in Access of TDO assessment. 12:20pm: Spoke with Amos Wheeler from crisis. She reports patient has changed his story and is now denying suicidal ideation. Discussed assessment with patient and based on information from assessment and ER staff reports she is planning to TDO the patient. She will contact Access regarding bed placement.     Linda Escamilla [facility]

## 2019-05-21 ENCOUNTER — HOSPITAL ENCOUNTER (OUTPATIENT)
Dept: ULTRASOUND IMAGING | Age: 54
Discharge: HOME | End: 2019-05-21
Attending: INTERNAL MEDICINE
Payer: MEDICAID

## 2019-05-21 LAB
ATRIAL RATE: 60 BPM
CALCULATED P AXIS, ECG09: 23 DEGREES
CALCULATED R AXIS, ECG10: 5 DEGREES
CALCULATED T AXIS, ECG11: 25 DEGREES
DIAGNOSIS, 93000: NORMAL
P-R INTERVAL, ECG05: 178 MS
Q-T INTERVAL, ECG07: 434 MS
QRS DURATION, ECG06: 92 MS
QTC CALCULATION (BEZET), ECG08: 434 MS
VENTRICULAR RATE, ECG03: 60 BPM

## 2019-05-21 PROCEDURE — 65220000003 HC RM SEMIPRIVATE PSYCH

## 2019-05-21 PROCEDURE — 51798 US URINE CAPACITY MEASURE: CPT

## 2019-05-21 PROCEDURE — 74011250637 HC RX REV CODE- 250/637: Performed by: PSYCHIATRY & NEUROLOGY

## 2019-05-21 PROCEDURE — 74011250637 HC RX REV CODE- 250/637: Performed by: NURSE PRACTITIONER

## 2019-05-21 PROCEDURE — 74011250637 HC RX REV CODE- 250/637: Performed by: INTERNAL MEDICINE

## 2019-05-21 PROCEDURE — 77030011255 HC DSG AQUACEL AG BMS -A

## 2019-05-21 PROCEDURE — 76770 US EXAM ABDO BACK WALL COMP: CPT

## 2019-05-21 PROCEDURE — 93005 ELECTROCARDIOGRAM TRACING: CPT

## 2019-05-21 RX ORDER — ALLOPURINOL 100 MG/1
100 TABLET ORAL DAILY
Status: DISCONTINUED | OUTPATIENT
Start: 2019-05-22 | End: 2019-05-27 | Stop reason: HOSPADM

## 2019-05-21 RX ORDER — METHADONE HYDROCHLORIDE 5 MG/5ML
130 SOLUTION ORAL DAILY
Status: DISCONTINUED | OUTPATIENT
Start: 2019-05-22 | End: 2019-05-27 | Stop reason: HOSPADM

## 2019-05-21 RX ORDER — METHADONE HYDROCHLORIDE 5 MG/1
130 TABLET ORAL DAILY
Status: DISCONTINUED | OUTPATIENT
Start: 2019-05-22 | End: 2019-05-21

## 2019-05-21 RX ORDER — TAMSULOSIN HYDROCHLORIDE 0.4 MG/1
0.4 CAPSULE ORAL DAILY
Status: DISCONTINUED | OUTPATIENT
Start: 2019-05-22 | End: 2019-05-27 | Stop reason: HOSPADM

## 2019-05-21 RX ORDER — SERTRALINE HYDROCHLORIDE 50 MG/1
50 TABLET, FILM COATED ORAL DAILY
Status: DISCONTINUED | OUTPATIENT
Start: 2019-05-22 | End: 2019-05-27 | Stop reason: HOSPADM

## 2019-05-21 RX ORDER — AMLODIPINE BESYLATE 5 MG/1
10 TABLET ORAL DAILY
Status: DISCONTINUED | OUTPATIENT
Start: 2019-05-22 | End: 2019-05-27 | Stop reason: HOSPADM

## 2019-05-21 RX ORDER — METHADONE HYDROCHLORIDE 5 MG/5ML
130 SOLUTION ORAL ONCE
Status: COMPLETED | OUTPATIENT
Start: 2019-05-21 | End: 2019-05-21

## 2019-05-21 RX ORDER — LANOLIN ALCOHOL/MO/W.PET/CERES
3 CREAM (GRAM) TOPICAL
Status: DISCONTINUED | OUTPATIENT
Start: 2019-05-21 | End: 2019-05-27 | Stop reason: HOSPADM

## 2019-05-21 RX ORDER — AMOXICILLIN 250 MG
1 CAPSULE ORAL DAILY
Status: DISCONTINUED | OUTPATIENT
Start: 2019-05-22 | End: 2019-05-27 | Stop reason: HOSPADM

## 2019-05-21 RX ORDER — LEVOTHYROXINE SODIUM 88 UG/1
88 TABLET ORAL
Status: DISCONTINUED | OUTPATIENT
Start: 2019-05-22 | End: 2019-05-22

## 2019-05-21 RX ORDER — METHADONE HYDROCHLORIDE 10 MG/ML
130 CONCENTRATE ORAL ONCE
Status: DISCONTINUED | OUTPATIENT
Start: 2019-05-21 | End: 2019-05-21 | Stop reason: SDUPTHER

## 2019-05-21 RX ORDER — OXYCODONE AND ACETAMINOPHEN 5; 325 MG/1; MG/1
2 TABLET ORAL
Status: DISCONTINUED | OUTPATIENT
Start: 2019-05-21 | End: 2019-05-22

## 2019-05-21 RX ADMIN — TRAZODONE HYDROCHLORIDE 50 MG: 50 TABLET ORAL at 21:05

## 2019-05-21 RX ADMIN — OXYCODONE HYDROCHLORIDE AND ACETAMINOPHEN 2 TABLET: 5; 325 TABLET ORAL at 21:05

## 2019-05-21 RX ADMIN — IBUPROFEN 400 MG: 400 TABLET ORAL at 11:44

## 2019-05-21 RX ADMIN — ACETAMINOPHEN 650 MG: 325 TABLET ORAL at 17:50

## 2019-05-21 RX ADMIN — IBUPROFEN 400 MG: 400 TABLET ORAL at 02:48

## 2019-05-21 RX ADMIN — METHADONE HYDROCHLORIDE 130 MG: 5 SOLUTION ORAL at 08:40

## 2019-05-21 NOTE — BH NOTES
Pt initially irritable and angry while requesting to go to bathroom. Staff informs pt that skin assessment would need to be completed first or pt would have to leave door open. Pt becomes upset stating \" I don't want no body in here with me! Just look at my damn skin now! Pt cooperative with admission process. Pt denies si/hi. Pt denies ah/vh. Pt denies any ah/vh. Pt states \" I don't belong here! Those people set me up! I never said I was suicidal just a little depressed. \" pt denies drug/alcohol abuse but BAL shows that pt was drinking recently. 2120 left message for wound care at ext [phone] for consult.

## 2019-05-21 NOTE — BH NOTES
PRN Medication Documentation    Specific patient behavior that led to need for PRN medication: c/o head,(L) foot and (L)side body pain  Staff interventions attempted prior to PRN being given: reposition,relaxation techniques,therapeutic communication  PRN medication given: POTylenol  Patient response/effectiveness of PRN medication:

## 2019-05-21 NOTE — INTERDISCIPLINARY ROUNDS
Behavioral Health Interdisciplinary Rounds     Patient Name: Anand Harper  Age: 46 y.o. Room/Bed:  732/  Primary Diagnosis: <principal problem not specified>   Admission Status: TDO     Readmission within 30 days: no  Power of  in place: no  Patient requires a blocked bed: no          Reason for blocked bed:     VTE Prophylaxis: No    Mobility needs/Fall risk: no  Flu Vaccine : no   Nutritional Plan: no  Consults:  Wound care         Labs/Testing due today?: no    Sleep hours:  4.0      Participation in Care/Groups:  yes  Medication Compliant?: Yes  PRNS (last 24 hours): Pain    Restraints (last 24 hours):  no     CIWA (range last 24 hours):     COWS (range last 24 hours):      Alcohol screening (AUDIT) completed -   AUDIT Score: 0     If applicable, date SBIRT discussed in treatment team AND documented:   AUDIT Screen Score: AUDIT Score: 0    Tobacco - patient is a smoker: Have You Used Tobacco in the Past 30 Days: Yes  Illegal Drugs use: Have You Used Any Illegal Substances Over the Past 12 Months: No    24 hour chart check complete: no     Patient goal(s) for today:   Treatment team focus/goals: Plan to assess for medications and discharge needs. Plan to set up his hearing.    Progress note : He  Has angry and refused treatment team.      LOS:  1  Expected LOS: TBD    Financial concerns/prescription coverage:  Medicaid   Date of last family contact:       Family requesting physician contact today:  no  Discharge plan: he will return home   Guns in the home:  no       Outpatient provider(s): TBD    Participating treatment team members: Anand Harper,  River Sanchez RN

## 2019-05-21 NOTE — BH NOTES
PRN Medication Documentation    Specific patient behavior that led to need for PRN medication: leg pain  Staff interventions attempted prior to PRN being given: emotional support  PRN medication given: Motrin 400mg Po @ 1144 at Pt's request  Patient response/effectiveness of PRN medication: some relief in symptoms

## 2019-05-21 NOTE — BH NOTES
GROUP THERAPY PROGRESS NOTE    Jordi Hdz [[place]] partially participated in an Acute Unit Process Group with a focus on identifying feelings, stating a goal for the rest of the day, and reviewing three DBT Emotion Regulation Skills: Building Mastery; Reducing vulnerability by managing ones physical health; and Building positive experiences. Group time: 45 minutes. Personal goal for participation: To increase the capacity to identify feelings, provide structure for the afternoon and evening today, and explore the potential to expand ones coping skills. Goal orientation: The patient will be able to identify feelings and explore additional coping skills, like setting an achievable goal in the short-term or paying attention to pleasure in ones life. Group therapy participation: With prompting, this patient marginally and minimally participated in the group. Therapeutic interventions reviewed and discussed: The group members were asked to introduce themselves to each other and to see if they could identify an emotion they are having and/or let the group know what they want to focus on for the day as they continue to make discharge plans. They also reviewed a handout on the DBT Emotion Regulations Skills related to Building mastery, Taking care of ones physical needs, and Building positive experiences. Copies of the handout were distributed to the group members to keep and review on their own time. Impression of participation: The patient sat in a wheelchair as he joined the group about 2/3 of the way through the session. He was  was alert, generally oriented, and initially quiet. He responded with gentle prompting and indicated that his \"foot\" was hurting. He voiced no current SI/HI and expressed no overt psychotic symptoms in group, although this latter concern was not fully evaluated in this group. His affect was mostly depressed. His mood matched his affect.  This was the patient's first process group with the undersigned in this hospitalization.

## 2019-05-21 NOTE — PROGRESS NOTES
Problem: Depressed Mood (Adult/Pediatric)  Goal: *STG: Remains safe in hospital  Outcome: Progressing Towards Goal  Note:   Received pt sitting out in milieu. Focused on using phone to call [employer] to get messages. No acute distress noted at this time. Staff will continue to monitor q 15 min checks.

## 2019-05-21 NOTE — BH NOTES
Pt. Involuntarily committed at his TDO hearing today       Pt. Refused to have IV fluids \"They just gave me some yesterday at the ER, I don't want any\"    Pt. Continues to refuse all lab work        [facility]    Agreed to retro perioneal ultrasound   Consult for podiatry Dr. Peña Diss [phone]  called    Pt. On 15 min.  Checks for safety

## 2019-05-21 NOTE — BH NOTES
PRN Medication Documentation    Specific patient behavior that led to need for PRN medication:Complained of pain in left leg  Staff interventions attempted prior to PRN being given: Assessed for level of pain and was 10. PRN medication given: Given Motrin 400 mg.po   Patient response/effectiveness of PRN medication: Slept without any complaints and remains asleep at this writing.

## 2019-05-21 NOTE — BH NOTES
Podiatry consult to Dr Jordan Guerrero. Received call from Dr. Wing Lakhani office.  Dr. Jordan Guerrero does not come to [facility].

## 2019-05-21 NOTE — PROGRESS NOTES
NUTRITION       Nutrition screening referral was triggered based on results obtained during nursing admission assessment for \"No wt loss, unsure. \"    The patient's chart was reviewed and nutrition assessment is not indicated at this time. Current wt is patient stated but not wt loss noted. Wt Readings from Last 10 Encounters:   05/20/19 108.9 kg (240 lb)   05/20/19 108.9 kg (240 lb)   04/25/19 108.9 kg (240 lb)   04/24/19 108.9 kg (240 lb)   09/28/18 103.4 kg (228 lb)   09/28/18 103.4 kg (228 lb)   09/05/18 101.2 kg (223 lb)   08/08/18 101.2 kg (223 lb)   07/31/18 104.3 kg (230 lb)   06/05/18 102.3 kg (225 lb 8.5 oz)     Once measured weight obtained, and if significant wt loss seen, please consult as needed. Thank you.      Joyce Shaikh RD

## 2019-05-21 NOTE — PROGRESS NOTES
Spoke with Tawanna Levin. Pt currently sleeping. Left phone number [phone] and instructions to call if patient wakes up and will be happy to come do the H&P then.  Otherwise, pt will be seen in AM.
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
## 2019-05-21 NOTE — BH NOTES
PSYCHOSOCIAL ASSESSMENT  :Patient identifying info:  Abhishek Coronado is a 46 y.o., male admitted 5/20/2019  5:47 PM     Presenting problem and precipitating factors: He presented to the Er due to foot pain and ETOH abuse. He reports he feels suicidal with no reason to live. Plan to jump off a bridge , run into traffic or shot himself if he could get a gun. He has a bladder tumor and has had recent foot mckenzie     Mental status assessment:    Collateral information:     Current psychiatric /substance abuse providers and contact info: no current     Previous psychiatric/substance abuse providers and response to treatment:     Family history of mental illness or substance abuse: unknown     Substance abuse history:  SEJAL was 24   Social History     Tobacco Use    Smoking status: Current Every Day Smoker     Packs/day: 1.00    Smokeless tobacco: Never Used   Substance Use Topics    Alcohol use: Yes     Alcohol/week: 8.4 oz     Types: 14 Cans of beer per week     Comment: occasional       History of biomedical complications associated with substance abuse :    Patient's current acceptance of treatment or motivation for change:poor     Family constellation:  , lives with a friend  Is significant other involved?        Describe support system:     Describe living arrangements and home environment:living with a friend     Health issues:   Hospital Problems  Date Reviewed: 10/12/2018          Codes Class Noted POA    Depression ICD-10-CM: F32.9  ICD-9-CM: 060  5/20/2019 Unknown        Gout (Chronic) ICD-10-CM: M10.9  ICD-9-CM: 274.9  10/12/2018 Yes        Hypothyroidism (Chronic) ICD-10-CM: E03.9  ICD-9-CM: 244.9  10/12/2018 Yes        Stroke ([facility] ICD-10-CM: I63.9  ICD-9-CM: 434.91  3/11/2011 Yes        Hypertension ICD-10-CM: I10  ICD-9-CM: 401.9  3/11/2011 Yes              Trauma history: none noted     Legal issues: no    History of  service: no    Financial status: SSDI     Hindu/cultural factors: none noted   Education/work history: high school education     Have you been licensed as a health care professional (current or ): no    Leisure and recreation preferences: unknown     Describe coping skills:desireectjustyna Longo  2019

## 2019-05-21 NOTE — PROGRESS NOTES
Problem: Discharge Planning  Goal: *Discharge to safe environment  Outcome: Progressing Towards Goal  Note:   He will return home     Goal: *Knowledge of medication management  Outcome: Progressing Towards Goal  Note:   He is compliant with his medications   Goal: *Knowledge of discharge instructions  Outcome: Progressing Towards Goal  Note:   He is able to verbalize discharge instructions

## 2019-05-21 NOTE — WOUND CARE
Wound Care Note:     New consult placed by nurse request for slow healing fracture    Chart shows:  Admitted for suicidal intent  Past Medical History:   Diagnosis Date    Aneurysm [facility])     (with stroke)    Bladder tumor     Cancer ([facility]     bladder CA    Chronic pain     Family history of bladder cancer     Gout     History of vascular access device 04/25/2019    [facility] VAT 4 FR MIDLINE R Cephalic Limited access    History of vascular access device 04/26/2019    4 fr Midline right Cephalic midline access    Hypercholesterolemia     Hypertension     Lesion of bladder     Seizures ([facility]     Stroke ([facility] 2006    left sided weakness    Thromboembolus ([facility]     Thyroid disease     TIA (transient ischemic attack) 2012     WBC = 13.5 on 5/20/19  Admitted from [address]    Assessment:   Patient is alert and talking, continent with some assistance needed in repositioning. Bed: Behavior health platform bed  Diet: Regular  Patient pre-medicated for pain by RN. Bilateral heels skin intact and without erythema. Buttock and sacrum were not assessed; patient sitting in wheelchair. Palpable DP pulses bilaterally    1. POA left lateral ankle wound measures 4.2 cm x 3.7 cm x 0.3 cm, wound bed is has scattered slough mostly with scattered pink, moderate serous drainage, ludwig-wound is intact with hemosiderin staining, wound edges are open. Aquacel AG, ABD pad and tape applied. 2.  POA left dorsal foot wound measures 2.5 cm x  3 cm x 0.3 cm, wound bed is pink granulation tissue, moderate amount of serous drainage, ludwig-wound intact, wound edges are open. Aquacel AG, gauze and tape applied. Patient sees Dr. Alex Guerrero who has been applying Unna boots weekly. A recent biopsy of the left foot was done with complete results listed in his 5/5/19 note. Results \"were not specific but could be seen in pyoderma gangrenosa\".   It does not indicate which wound was biopsied but the left lateral ankle wound has the appearance of PG and is very painful; however, it does not have purple along the ludwig-wound. The patient stated the left dorsal foot wound was caused by something being dropped on his foot, the ankle wound was caused by the surgical shoe he was wearing. Patient up in wheelchair. Recommendations:    Left foot wounds (2)- Every other day cleanse with normal saline, wipe wound bed clean and dry, apply a piece of Aquacel AG to wound beds, cover ankle wound with ABD pad and dorsal foot with gauze and tape to secure. Change dressing PRN with breakthrough drainage. Skin Care & Pressure Prevention:  Minimize layers of linen/pads under patient to optimize support surface. Turn/reposition approximately every 2 hours and offload heels.   Manage incontinence / promote continence     Discussed above plan with patient & Jordan Means RN    Transition of Care: Plan to follow as needed while admitted to hospital.    AIXA Freire, RN, [facility], [facility].  office [phone]  pager [phone] or call  to page

## 2019-05-21 NOTE — BH NOTES
TRANSFER - OUT REPORT:    Verbal report given to [place] on Yaw Morales  being transferred to [address] [place] [address] for routine progression of care       Report consisted of patients Situation, Background, Assessment and   Recommendations(SBAR). Information from the following report(s) SBAR was reviewed with the receiving nurse. Opportunity for questions and clarification was provided.       Patient transported with:   Registered Nurse

## 2019-05-21 NOTE — BH NOTES
At 1808:  Patient c/o pain in left foot. Gave Motrin for 9/10 pain. PRN Medication Documentation  Specific patient behavior that led to need for PRN medication: Fx left foot  Staff interventions attempted prior to PRN being given: elevated LLE  PRN medication given: Motrin  Patient response/effectiveness of PRN medication: 8/10       Patient has left foot fracture with soft boot in place. Took old dressing off, cleansed with NS, wrapped with new cling and new pressure dressing. Boot placed back on left foot. Ulcer between hallux and long toe, stage II. Ulcer on lateral malleolus, stage II. Pressure Injury Documentation  (COMPLETE ONE LABEL PER PRESSURE INJURY)  For further information, please review corresponding Wound Care flowsheet. Rosy Clayton has:    Stage II, III or IV, deep tissue injury (DTI), unstageable/necrotic pressure injury with a wound care nurse and dietitian consult indicated. Pressure injury prevention and treatment indicated/ongoing. Location Number:  Left foot: Between left hallux and long toe (1) and left lateral malleolus (1)    Stage:   II Both ulcers look like stage II with ~ 20% yellow, loose sloth and pink tissue with minimal granulation.          Theresa Appl

## 2019-05-21 NOTE — PROGRESS NOTES
Patient has been fall free since he arrived on the Acute Unit of  [address]. He is currently sleeping, no stress noted.

## 2019-05-21 NOTE — BH NOTES
PRN Medication Documentation    Specific patient behavior that led to need for PRN medication: pain in left foot  Staff interventions attempted prior to PRN being given: rest,elevation, etc.  PRN medication given:  mg of tylenol  Patient response/effectiveness of PRN medication: will reassess within 1 hour

## 2019-05-21 NOTE — PROGRESS NOTES
Problem: Depressed Mood (Adult/Pediatric)  Goal met by 5/31/2019  Goal: *STG: Participates in treatment plan  Outcome: Not Progressing Towards Goal  Note:   Mikal TDO  scheduled for today 5/21/2019 at 2 pm  pt. Angry  sad  depressed but denies suicidal ideation in the hospital      \"I don't know where I'm going to live\"     Problem: Depressed Mood (Adult/Pediatric)  Goal: *STG: Attends activities and groups  Outcome: Not Progressing Towards Goal  Note:   Pt. Isolating in his room      Problem: Depressed Mood (Adult/Pediatric)  Goal: *STG: Complies with medication therapy  Outcome: Progressing Towards Goal  Note:   Medication compliant    reviewed in treatment team          Problem: Falls - Risk of  Goal: *Absence of Falls  Description  Document Benjamin Novoa Fall Risk and appropriate interventions in the flowsheet. Outcome: Progressing Towards Goal  Note:   Fall Risk Interventions: wheelchair  on this unit         weight bearing  boot   cane at home  Mobility Interventions: Patient to call before getting OOB         Medication Interventions: Teach patient to arise slowly         History of Falls Interventions: Door open when patient unattended, Room close to nurse's station         Problem: Depressed Mood (Adult/Pediatric)  Goal: Interventions  Outcome: Not Progressing Towards Goal  Note:   Will continue to monitor  on 15 min.  Checks for safety  assess depression    wound care     medication compliance  effectiveness  encourage unit participation       Geri [address]        Date Treatment Plan Initiated: 5/21/2019      Treatment Plan Modalities:    Type of Modality Amount  (x minutes) Frequency (x/week) Duration (x days) Name of Responsible Staff   Community & wrap-up meetings to encourage peer interactions    15    7    1     ROS Zamora   Group psychotherapy to assist in building coping skills and internal controls    60    7    1    [facility] MURPHYW   Therapeutic activity groups to build coping skills    60    7    1    Selvin Vaz \[facility]\""W   Psychoeducation in group setting to address:   Medication education    15    7    1    Ganga Rosales RN   Coping skills    20    7    1  Lauren Gaona RN   Relaxation techniques           Symptom management           Discharge planning    15    7    1    Rosette Luciano,    Spirituality     60    7    1    james BOB    60    7    1    Volunteer from [facility]/AA/NA    60    7    1    Volunteer from [address] [place] [address] medication management    15    7    1    Dr. Laura Miranda meeting/discharge planning

## 2019-05-21 NOTE — PROGRESS NOTES
Received call from Tawanna Levin that patient is awake and willing to do H&P. When I arrived, the patient was lying down. At first he said no to H&P, then changed his mind but said he would only do it if I ordered his methadone. I explained that I cannot do that and he asked writer to leave.  Will ask AM shift to attempt H&P.

## 2019-05-21 NOTE — BSMART NOTE
GROUP THERAPY PROGRESS NOTE Ronny Dorantes  is participating in Reflections Group. Group time: 15 minutes Personal goal for participation:  
 
Goal orientation: relaxation Group therapy participation: active Therapeutic interventions reviewed and discussed: Review rules of the unit, named relaxation techniques, and reviewed goals set at the beginning of the day. Impression of participation: Pt was attentive and asked questions.

## 2019-05-22 LAB
ANION GAP SERPL CALC-SCNC: 8 MMOL/L (ref 5–15)
BUN SERPL-MCNC: 9 MG/DL (ref 6–20)
BUN/CREAT SERPL: 14 (ref 12–20)
CALCIUM SERPL-MCNC: 8.8 MG/DL (ref 8.5–10.1)
CHLORIDE SERPL-SCNC: 110 MMOL/L (ref 97–108)
CO2 SERPL-SCNC: 24 MMOL/L (ref 21–32)
CREAT SERPL-MCNC: 0.63 MG/DL (ref 0.7–1.3)
GLUCOSE SERPL-MCNC: 95 MG/DL (ref 65–100)
POTASSIUM SERPL-SCNC: 3.7 MMOL/L (ref 3.5–5.1)
SODIUM SERPL-SCNC: 142 MMOL/L (ref 136–145)

## 2019-05-22 PROCEDURE — 80048 BASIC METABOLIC PNL TOTAL CA: CPT

## 2019-05-22 PROCEDURE — 74011250637 HC RX REV CODE- 250/637: Performed by: INTERNAL MEDICINE

## 2019-05-22 PROCEDURE — 65220000003 HC RM SEMIPRIVATE PSYCH

## 2019-05-22 PROCEDURE — 74011250637 HC RX REV CODE- 250/637: Performed by: NURSE PRACTITIONER

## 2019-05-22 PROCEDURE — 36415 COLL VENOUS BLD VENIPUNCTURE: CPT

## 2019-05-22 PROCEDURE — 74011250637 HC RX REV CODE- 250/637: Performed by: PSYCHIATRY & NEUROLOGY

## 2019-05-22 RX ORDER — LEVOTHYROXINE SODIUM 100 UG/1
100 TABLET ORAL
Status: DISCONTINUED | OUTPATIENT
Start: 2019-05-23 | End: 2019-05-27 | Stop reason: HOSPADM

## 2019-05-22 RX ADMIN — TAMSULOSIN HYDROCHLORIDE 0.4 MG: 0.4 CAPSULE ORAL at 09:32

## 2019-05-22 RX ADMIN — AMLODIPINE BESYLATE 10 MG: 5 TABLET ORAL at 09:33

## 2019-05-22 RX ADMIN — HYDROXYZINE HYDROCHLORIDE 50 MG: 50 TABLET, FILM COATED ORAL at 16:11

## 2019-05-22 RX ADMIN — ALLOPURINOL 100 MG: 100 TABLET ORAL at 09:32

## 2019-05-22 RX ADMIN — ACETAMINOPHEN 650 MG: 325 TABLET ORAL at 10:00

## 2019-05-22 RX ADMIN — OXYCODONE HYDROCHLORIDE AND ACETAMINOPHEN 2 TABLET: 5; 325 TABLET ORAL at 03:32

## 2019-05-22 RX ADMIN — ACETAMINOPHEN 650 MG: 325 TABLET ORAL at 00:43

## 2019-05-22 RX ADMIN — Medication 3 MG: at 21:02

## 2019-05-22 RX ADMIN — LEVOTHYROXINE SODIUM 88 MCG: 88 TABLET ORAL at 06:41

## 2019-05-22 RX ADMIN — SERTRALINE HYDROCHLORIDE 50 MG: 50 TABLET ORAL at 09:33

## 2019-05-22 RX ADMIN — METHADONE HYDROCHLORIDE 130 MG: 5 SOLUTION ORAL at 06:39

## 2019-05-22 RX ADMIN — ACETAMINOPHEN 650 MG: 325 TABLET ORAL at 16:11

## 2019-05-22 NOTE — PROGRESS NOTES
Problem: Depressed Mood (Adult/Pediatric)  Goal: *STG: Participates in treatment plan  Outcome: Progressing Towards Goal   Received this morning resting in bed. Woke alert and oriented. Thoughts have been clear and organized. Has appeared anxious and irritated/frustrated at times for example during am cares this morning,becoming irritable with staff. Patient did come back and apologized. Can bee needy/demanding. Was out on the unit this morning,but has returned to his room this afternoon,isolating a bit. Has been medication and meal compliant. Dressing to leg has remained dry and intact. Staff to maintain safety on the unit during hospitalization. Patient was not compliant today with informing staff about voiding in order to obtain PVR. Voiced he voided a large amount this afternoon aroud 1400.  Staff re-inforced the need for the post void residual.
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
## 2019-05-22 NOTE — BH NOTES
PRN Medication Documentation    Specific patient behavior that led to need for PRN medication: pain  Staff interventions attempted prior to PRN being given: relaxation  PRN medication given: percocet  Patient response/effectiveness of PRN medication: well, will continue to monitor    PRN Medication Documentation    Specific patient behavior that led to need for PRN medication: insomnia   Staff interventions attempted prior to PRN being given: relaxation  PRN medication given: trazodone  Patient response/effectiveness of PRN medication: well, will continue to monitor

## 2019-05-22 NOTE — PROGRESS NOTES
Problem: Falls - Risk of  Goal: *Absence of Falls  Description  Document Luis Young Fall Risk and appropriate interventions in the flowsheet.   Outcome: Progressing Towards Goal  Note:   Fall Risk Interventions:  Mobility Interventions: Patient to call before getting OOB         Medication Interventions: Teach patient to arise slowly         History of Falls Interventions: Door open when patient unattended, Room close to nurse's station

## 2019-05-22 NOTE — PROGRESS NOTES
Problem: Depressed Mood (Adult/Pediatric)  Goal: *STG: Remains safe in hospital  Outcome: Progressing Towards Goal    Has remained safe in hospital. Denies SI,HI and Hallucinations. Remains on q 15 min safety checks. Visible in DR watching TV, socializing with peers and ate 100% dinner, fluids and snacks. Continues to have complaints of Left foot pain from broken foot. Received PRN Tylenol for pain and Atarax for anxiety with good results.

## 2019-05-22 NOTE — PROGRESS NOTES
Problem: Falls - Risk of  Goal: *Absence of Falls  Description  Document Carleenrio Quinton Fall Risk and appropriate interventions in the flowsheet. Outcome: Progressing Towards Goal   Free of falls. Falls tool completed and accurate. Bed alarm on the bed. Patient getting around on the unit via w/c. Needing minimal assistance with transfering during shower or when going to the bathroom. Staff to continue to maintain safety during hospitalization.

## 2019-05-22 NOTE — PROGRESS NOTES
Problem: Falls - Risk of  Goal: *Absence of Falls  Description  Document Honoroi Foster Fall Risk and appropriate interventions in the flowsheet.   5/22/2019 1524 by Isaac Walsh LPN  Outcome: Progressing Towards Goal  5/22/2019 1523 by Isaac Walsh LPN  Outcome: Progressing Towards Goal     Problem: Patient Education: Go to Patient Education Activity  Goal: Patient/Family Education  5/22/2019 1524 by Isaac Walsh LPN  Outcome: Progressing Towards Goal  5/22/2019 1523 by Isaac Walsh LPN  Outcome: Progressing Towards Goal   Will continue q 15 minute checks for saftey

## 2019-05-22 NOTE — BH NOTES
PRN Medication Documentation    Specific patient behavior that led to need for PRN medication:anxiety,irritable Staff interventions attempted prior to PRN being given:redirection,coping skills PRN medication given: atarax  Patient response/effectiveness of PRN medication: good

## 2019-05-22 NOTE — BH NOTES
PRN Medication Documentation    Specific patient behavior that led to need for PRN medication: pain  Staff interventions attempted prior to PRN being given: sleeping  PRN medication given: tylenol  Patient response/effectiveness of PRN medication: well will continue to monitor    PRN Medication Documentation    Specific patient behavior that led to need for PRN medication: pain  Staff interventions attempted prior to PRN being given: relaxation  PRN medication given: percocet  Patient response/effectiveness of PRN medication: well, will continue to monitor

## 2019-05-22 NOTE — BH NOTES
GROUP THERAPY PROGRESS NOTE    Ariana Stephens is participating in Anger Management.      Group time: 35 minutes    Personal goal for participation: To gain awareness    Goal orientation: personal    Group therapy participation: Patient did not attend even though encouraged by staff to do so    Therapeutic interventions reviewed and discussed: [facility] [employer] [facility] and Memories    Impression of participation: N/A

## 2019-05-22 NOTE — PROGRESS NOTES
PSYCHIATRIC PROGRESS NOTE    Chief Complaint:  \"Pain. \"    Interval History:  Jenelle Adrian states his main issue is his pain. He denies si hi or avh. He denies being overly depressed, notes that his depression is stemming from his medical comorbidities. He is already on a good dose of methadone. He is out in the unit and interacts well with staff and peers. No acute management issue.       Past Medical History:  Past Medical History:   Diagnosis Date    [address] [address]     (with stroke)    Bladder tumor     Cancer ([facility] [address]     bladder CA    Chronic pain     Family history of bladder cancer     Gout     History of vascular access device 04/25/2019    [facility] VAT 4 FR MIDLINE R Cephalic Limited access    History of vascular access device 04/26/2019    4 fr Midline right Cephalic midline access    Hypercholesterolemia     Hypertension     Lesion of bladder     Seizures ([facility] [address]     Stroke ([facility] [address] 2006    left sided weakness    Thromboembolus ([facility] [address]     Thyroid disease     TIA (transient ischemic attack) 2012         ALLERGIES:(reviewed/updated 5/22/2019)  Allergies   Allergen Reactions    Sulfamethoxazole-Trimethoprim Rash     L eye and L hand    Ciprofloxacin Hives     Per pcp records    Codeine Hives     Tolerates dilaudid, oxycodone    Cymbalta [Duloxetine] Other (comments)     Confusion and memory loss    Gabapentin Hives and Diarrhea    Lyrica [Pregabalin] Hives    Sulfa (Sulfonamide Antibiotics) Rash    Ultram [Tramadol] Hives    Vancomycin Shortness of Breath       Laboratory report:  Lab Results   Component Value Date/Time    WBC 13.5 (H) 05/20/2019 05:04 AM    HGB 12.1 05/20/2019 05:04 AM    HCT 37.3 05/20/2019 05:04 AM    PLATELET 985 98/19/3351 05:04 AM    MCV 87.8 05/20/2019 05:04 AM      Lab Results   Component Value Date/Time    Sodium 142 05/22/2019 07:10 AM    Potassium 3.7 05/22/2019 07:10 AM    Chloride 110 (H) 05/22/2019 07:10 AM    CO2 24 05/22/2019 07:10 AM    Anion gap 8 05/22/2019 07:10 AM Glucose 95 05/22/2019 07:10 AM    BUN 9 05/22/2019 07:10 AM    Creatinine 0.63 (L) 05/22/2019 07:10 AM    BUN/Creatinine ratio 14 05/22/2019 07:10 AM    GFR est AA >60 05/22/2019 07:10 AM    GFR est non-AA >60 05/22/2019 07:10 AM    Calcium 8.8 05/22/2019 07:10 AM    Bilirubin, total 0.3 05/20/2019 05:04 AM    AST (SGOT) 38 (H) 05/20/2019 05:04 AM    Alk. phosphatase 124 (H) 05/20/2019 05:04 AM    Protein, total 7.4 05/20/2019 05:04 AM    Albumin 3.7 05/20/2019 05:04 AM    Globulin 3.7 05/20/2019 05:04 AM    A-G Ratio 1.0 (L) 05/20/2019 05:04 AM    ALT (SGPT) 36 05/20/2019 05:04 AM      Vitals:    05/21/19 2021 05/22/19 0726 05/22/19 1246 05/22/19 1652   BP: 118/79 125/78 117/64 131/66   Pulse: (!) 59 63 85 71   Resp: 18 16 18 16   Temp:  97.4 °F (36.3 °C) 97.5 °F (36.4 °C) 97.6 °F (36.4 °C)   SpO2: 97% 97% 97% 97%   Weight:       Height:           Lab Results   Component Value Date/Time    Valproic acid 99 10/10/2016 04:23 AM     No results found for: [facility]    Vital Signs  Patient Vitals for the past 24 hrs:   Temp Pulse Resp BP SpO2   05/22/19 1652 97.6 °F (36.4 °C) 71 16 131/66 97 %   05/22/19 1246 97.5 °F (36.4 °C) 85 18 117/64 97 %   05/22/19 0726 97.4 °F (36.3 °C) 63 16 125/78 97 %   05/21/19 2021 -- (!) 59 18 118/79 97 %     Wt Readings from Last 3 Encounters:   05/20/19 108.9 kg (240 lb)   05/20/19 108.9 kg (240 lb)   04/25/19 108.9 kg (240 lb)     Temp Readings from Last 3 Encounters:   05/22/19 97.6 °F (36.4 °C)   05/20/19 98 °F (36.7 °C)   05/10/19 97.9 °F (36.6 °C)     BP Readings from Last 3 Encounters:   05/22/19 131/66   05/20/19 98/63   05/10/19 151/78     Pulse Readings from Last 3 Encounters:   05/22/19 71   05/20/19 76   05/10/19 60       Radiology (reviewed/updated 5/22/2019)  Xr Foot Lt Ap/lat    Result Date: 4/23/2019  EXAM: XR FOOT LT AP/LAT INDICATION: osteo? . COMPARISON: 2018 FINDINGS: Two views of the left foot demonstrate osteopenia. No bony destructive lesion is identified. . There is slight sclerosis left fifth distal metatarsal similar to the prior study. .     IMPRESSION: Osteopenia. No bony destructive lesion is identified. Follow-up study may be helpful to assess for interval change. .    Ct Head Wo Cont    Result Date: 5/20/2019  EXAMINATION:  CT HEAD WO CONT CLINICAL INFORMATION:  fall COMPARISON:  2/7/2013 TECHNIQUE: Routine axial head CT was performed. IV contrast was not administered. Sagittal and coronal reconstructions were generated. CT dose reduction was achieved through use of a standardized protocol tailored for this examination and automatic exposure control for dose modulation. FINDINGS: No acute infarct, hemorrhage or mass. VENTRICULAR SYSTEM:  Unchanged ventricular size, without hydrocephalus. Unchanged ventricular asymmetry with mild to moderate enlargement of the right lateral ventricle. BASAL CISTERNS:  Patent. 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 MIDLINE SHIFT:  None. CALVARIUM/ SKULL BASE: Intact. Left parietal scalp laceration and swelling. PARANASAL SINUSES AND MASTOID AIR CELLS: Clear. VISUALIZED ORBITS: No significant abnormalities. SELLA: No enlargement. IMPRESSION:  No acute intracranial abnormalities. No change since 2/7/2013. Ct Low Ext Lt W Cont    Result Date: 4/25/2019  EXAM: CT LOW EXT LT W CONT INDICATION: Left foot infection COMPARISON: Radiographs 4/23/2018. MRI 3/15/2018 CONTRAST: 100 mL of Isovue-300. TECHNIQUE: Helical CT of the left foot during uneventful rapid bolus intravenous contrast administration. Coronal and Sagittal reformats were generated. Images reviewed in soft tissue and bone windows. CT dose reduction was achieved through the use of a standardized protocol tailored for this examination and automatic exposure control for dose modulation. FINDINGS: Bones: The bones are osteopenic. No fracture, dislocation, or periosteal reaction.  Chronic ossicles adjacent to the tip of the medial malleolus likely related to prior injury. Joint fluid: No significant effusion. Articulations: No significant osteoarthritis. No evidence of inflammatory arthritis. Tendons: There is thickening of the Achilles tendon related to tendinosis. Several chronic calcifications are seen along the medial margin likely related to heterotopic ossification. Muscles: There is partial atrophy of the musculature Soft tissue mass: No mass. Bandaging is seen over the dorsum of the forefoot and over the medial side of the ankle. There is diffuse skin thickening and subcutaneous edema. No evidence of a focal drainable fluid collection. IMPRESSION: Diffuse skin thickening and subcutaneous edema. No evidence of a focal drainable fluid collection. No evidence of osteomyelitis. Us Retroperitoneum Comp    Result Date: 5/21/2019  EXAM:  US RETROPERITONEUM COMP INDICATION:  EARL COMPARISON: None. TECHNIQUE: Real-time sonography of the kidneys, retroperitoneum and bladder was performed with multiple static images obtained. FINDINGS: The kidneys have normal echogenicity with no mass, stone or hydronephrosis. The right kidney measures 10.3 cm and the left kidney measures 11 cm in length. The aorta is obscured by bowel gas  The proximal iliac arteries are obscured by bowel gas  The IVC is normal. No retroperitoneal mass is identified. The urinary bladder is moderately distended. Kidneys are within normal limits. Xr Us Guided Vascular Access    Result Date: 4/29/2019  INDICATION:   NO VENOUS ACCESS  EXAMINATION:  US GUIDE VAS ACCESS FINDINGS: Ultrasound was provided for Midline placement. IMPRESSION: Ultrasound guidance for Midline  placement. GBP    Xr Us Guided Vascular Access    Result Date: 4/25/2019  INDICATION:   NO VENOUS ACCESS  EXAMINATION:  US GUIDE VAS ACCESS FINDINGS: Ultrasound was provided for Midline placement. IMPRESSION: Ultrasound guidance for Midline  placement. GBP      Side Effects: (reviewed/updated 5/22/2019)  None reported or admitted to. Review of Systems: (reviewed/updated 5/22/2019)  Appetite: good  Sleep: good   All other Review of Systems: negative    Mental Status Exam:  Eye contact: Good eye contact  Psychomotor activity: Relaxed   Speech is spontaneous  Thought process: Logical and goal directed  Mood is \"ok\"  Affect: Blunted  Perception: No avh  Suicidal ideation: No si  Homicidal ideation: No hi  Insight/judgment: Poor  Cognition is grossly intact      Physical Exam:  Musculoskeletal system: steady gait  Tremor not present  Cog wheeling not present      Assessment and Plan:  Rosy Clayton meets criteria for a diagnosis of unspecified mood disorder. Continue current medications as prescribed. We will closely monitor for safety. We will encourage reality orientation. Plan for discharge in am, if stable in the next 24 hours. I certify that this patients inpatient psychiatric hospital services furnished since the previous certification were, and continue to be, required for treatment that could reasonably be expected to improve the patient's condition, or for diagnostic study, and that the patient continues to need, on a daily basis, active treatment furnished directly by or requiring the supervision of inpatient psychiatric facility personnel. In addition, the hospital records show that services furnished were intensive treatment services, admission or related services, or equivalent services.       Signed:  Dionicio Khan NP  5/22/2019

## 2019-05-23 LAB
BASOPHILS # BLD: 0.1 K/UL (ref 0–0.1)
BASOPHILS NFR BLD: 1 % (ref 0–1)
DIFFERENTIAL METHOD BLD: ABNORMAL
EOSINOPHIL # BLD: 0.4 K/UL (ref 0–0.4)
EOSINOPHIL NFR BLD: 5 % (ref 0–7)
ERYTHROCYTE [DISTWIDTH] IN BLOOD BY AUTOMATED COUNT: 14.3 % (ref 11.5–14.5)
HCT VFR BLD AUTO: 36.6 % (ref 36.6–50.3)
HGB BLD-MCNC: 11.7 G/DL (ref 12.1–17)
IMM GRANULOCYTES # BLD AUTO: 0 K/UL (ref 0–0.04)
IMM GRANULOCYTES NFR BLD AUTO: 1 % (ref 0–0.5)
LYMPHOCYTES # BLD: 2.9 K/UL (ref 0.8–3.5)
LYMPHOCYTES NFR BLD: 41 % (ref 12–49)
MCH RBC QN AUTO: 28.3 PG (ref 26–34)
MCHC RBC AUTO-ENTMCNC: 32 G/DL (ref 30–36.5)
MCV RBC AUTO: 88.6 FL (ref 80–99)
MONOCYTES # BLD: 0.6 K/UL (ref 0–1)
MONOCYTES NFR BLD: 9 % (ref 5–13)
NEUTS SEG # BLD: 3 K/UL (ref 1.8–8)
NEUTS SEG NFR BLD: 43 % (ref 32–75)
NRBC # BLD: 0 K/UL (ref 0–0.01)
NRBC BLD-RTO: 0 PER 100 WBC
PLATELET # BLD AUTO: 186 K/UL (ref 150–400)
PMV BLD AUTO: 11.4 FL (ref 8.9–12.9)
RBC # BLD AUTO: 4.13 M/UL (ref 4.1–5.7)
WBC # BLD AUTO: 7 K/UL (ref 4.1–11.1)

## 2019-05-23 PROCEDURE — 65220000003 HC RM SEMIPRIVATE PSYCH

## 2019-05-23 PROCEDURE — 74011250637 HC RX REV CODE- 250/637: Performed by: INTERNAL MEDICINE

## 2019-05-23 PROCEDURE — 74011250637 HC RX REV CODE- 250/637: Performed by: PSYCHIATRY & NEUROLOGY

## 2019-05-23 PROCEDURE — 85025 COMPLETE CBC W/AUTO DIFF WBC: CPT

## 2019-05-23 PROCEDURE — 36415 COLL VENOUS BLD VENIPUNCTURE: CPT

## 2019-05-23 PROCEDURE — 74011250637 HC RX REV CODE- 250/637: Performed by: NURSE PRACTITIONER

## 2019-05-23 RX ORDER — SERTRALINE HYDROCHLORIDE 50 MG/1
50 TABLET, FILM COATED ORAL DAILY
Qty: 30 TAB | Refills: 0 | Status: ON HOLD | OUTPATIENT
Start: 2019-05-23 | End: 2020-10-21

## 2019-05-23 RX ADMIN — AMLODIPINE BESYLATE 10 MG: 5 TABLET ORAL at 10:14

## 2019-05-23 RX ADMIN — TAMSULOSIN HYDROCHLORIDE 0.4 MG: 0.4 CAPSULE ORAL at 10:15

## 2019-05-23 RX ADMIN — SERTRALINE HYDROCHLORIDE 50 MG: 50 TABLET ORAL at 10:15

## 2019-05-23 RX ADMIN — METHADONE HYDROCHLORIDE 130 MG: 5 SOLUTION ORAL at 07:04

## 2019-05-23 RX ADMIN — ACETAMINOPHEN 650 MG: 325 TABLET ORAL at 15:14

## 2019-05-23 RX ADMIN — ACETAMINOPHEN 650 MG: 325 TABLET ORAL at 03:39

## 2019-05-23 RX ADMIN — ALLOPURINOL 100 MG: 100 TABLET ORAL at 10:13

## 2019-05-23 RX ADMIN — ACETAMINOPHEN 650 MG: 325 TABLET ORAL at 10:22

## 2019-05-23 RX ADMIN — LEVOTHYROXINE SODIUM 100 MCG: 100 TABLET ORAL at 07:04

## 2019-05-23 RX ADMIN — ACETAMINOPHEN 650 MG: 325 TABLET ORAL at 21:41

## 2019-05-23 NOTE — DISCHARGE INSTRUCTIONS
DISCHARGE SUMMARY    NAME:Wei Hansen  : 1965  MRN: [id number]    The patient Biju Barger exhibits the ability to control behavior in a less restrictive environment. Patient's level of functioning is improving. No assaultive/destructive behavior has been observed for the past 24 hours. No suicidal/homicidal threat or behavior has been observed for the past 24 hours. There is no evidence of serious medication side effects. Patient has not been in physical or protective restraints for at least the past 24 hours. If weapons involved, how are they secured? No access to weapons     Is patient aware of and in agreement with discharge plan? Patient is aware of discharge and is in agreement     Arrangements for medication:  Prescription given to patient. Copy of discharge instructions to  provider?:  Yes, fax to PCP     Arrangements for transportation home:  [place]    Keep all follow up appointments as scheduled, continue to take prescribed medications per physician instructions. Mental health crisis number:  [phone] or your local mental health crisis line number at [phone] [facility]) or [phone] [facility]. PATIENT INSTRUCTIONS:    What to do at Home:  Recommended activity: Activity as tolerated    If you experience any of the following symptoms, depressed mood, hopelessness, helplessness, suicidal thoughts,  please follow up with 911 or call you local mental health crisis number at [phone]. *  Please give a list of your current medications to your Primary Care Provider. *  Please update this list whenever your medications are discontinued, doses are      changed, or new medications (including over-the-counter products) are added. *  Please carry medication information at all times in case of emergency situations.     These are general instructions for a healthy lifestyle:    No smoking/ No tobacco products/ Avoid exposure to second hand smoke  Surgeon General's Warning: Quitting smoking now greatly reduces serious risk to your health. Obesity, smoking, and sedentary lifestyle greatly increases your risk for illness    A healthy diet, regular physical exercise & weight monitoring are important for maintaining a healthy lifestyle    You may be retaining fluid if you have a history of heart failure or if you experience any of the following symptoms:  Weight gain of 3 pounds or more overnight or 5 pounds in a week, increased swelling in our hands or feet or shortness of breath while lying flat in bed. Please call your doctor as soon as you notice any of these symptoms; do not wait until your next office visit. Recognize signs and symptoms of STROKE:    F-face looks uneven    A-arms unable to move or move unevenly    S-speech slurred or non-existent    T-time-call 911 as soon as signs and symptoms begin-DO NOT go       Back to bed or wait to see if you get better-TIME IS BRAIN. Warning Signs of HEART ATTACK     Call 911 if you have these symptoms:   Chest discomfort. Most heart attacks involve discomfort in the center of the chest that lasts more than a few minutes, or that goes away and comes back. It can feel like uncomfortable pressure, squeezing, fullness, or pain.  Discomfort in other areas of the upper body. Symptoms can include pain or discomfort in one or both arms, the back, neck, jaw, or stomach.  Shortness of breath with or without chest discomfort.  Other signs may include breaking out in a cold sweat, nausea, or lightheadedness. Don't wait more than five minutes to call 911 - MINUTES MATTER! Fast action can save your life. Calling 911 is almost always the fastest way to get lifesaving treatment. Emergency Medical Services staff can begin treatment when they arrive -- up to an hour sooner than if someone gets to the hospital by car. The discharge information has been reviewed with the patient. The patient verbalized understanding.   Discharge medications reviewed with the patient and appropriate educational materials and side effects teaching were provided.   ___________________________________________________________________________________________________________________________________

## 2019-05-23 NOTE — PROGRESS NOTES
Chart reviewed. EARL resolved. Patient needs to follow up with outpatient podiatrist, as well as outpatient urologist.   No further acute medical needs. Will sign off. Thank you for this consult, please re-consult if needed.

## 2019-05-23 NOTE — PROGRESS NOTES
Problem: Depressed Mood (Adult/Pediatric)  Goal: *STG: Participates in treatment plan  Outcome: Progressing Towards Goal  Note:   Patient is participatory in treatment team. Denies SI/HI.    Goal: *STG: Remains safe in hospital  Outcome: Progressing Towards Goal  Note:   Safe  Goal: *STG: Complies with medication therapy  Outcome: Progressing Towards Goal  Note:   Compliant

## 2019-05-23 NOTE — INTERDISCIPLINARY ROUNDS
Behavioral Health Interdisciplinary Rounds     Patient Name: Lidia Acosta  Age: 46 y.o. Room/Bed:  740/02  Primary Diagnosis: <principal problem not specified>   Admission Status: Involuntary Commitment     Readmission within 30 days: no  Power of  in place: no  Patient requires a blocked bed: no          Reason for blocked bed:     VTE Prophylaxis: Not indicated    Mobility needs/Fall risk: yes  Flu Vaccine : not flu season   Nutritional Plan: no  Consults: podiatry         Labs/Testing due today?: yes    Sleep hours: 4.5       Participation in Care/Groups:  yes  Medication Compliant?: Yes and refused colace  PRNS (last 24 hours):  Antianxiety, Sleep Aid and Pain    Restraints (last 24 hours):  no     CIWA (range last 24 hours):     COWS (range last 24 hours):      Alcohol screening (AUDIT) completed -   AUDIT Score: 0     If applicable, date SBIRT discussed in treatment team AND documented:   AUDIT Screen Score: AUDIT Score: 0  Tobacco - patient is a smoker: Have You Used Tobacco in the Past 30 Days: Yes  Illegal Drugs use: Have You Used Any Illegal Substances Over the Past 12 Months: No    24 hour chart check complete: yes     Patient goal(s) for today:   Treatment team focus/goals: Plan for discharge today  Progress note : plan for discharge today     LOS:  3  Expected LOS: today     Financial concerns/prescription coverage: medicaid   Date of last family contact:       Family requesting physician contact today:    Discharge plan: he will return home   Guns in the home:  no       Outpatient provider(s): PCP     Participating treatment team members: Janice Saba 35, RN - Ming Mcclellan NP

## 2019-05-23 NOTE — PROGRESS NOTES
Problem: Falls - Risk of  Goal: *Absence of Falls  Description  Document Eileen Szymanski Fall Risk and appropriate interventions in the flowsheet.   Outcome: Progressing Towards Goal  Note:   Fall Risk Interventions:  Mobility Interventions: Communicate number of staff needed for ambulation/transfer         Medication Interventions: Teach patient to arise slowly         History of Falls Interventions: Door open when patient unattended, Room close to nurse's station

## 2019-05-23 NOTE — BH NOTES
PRN Medication Documentation    Specific patient behavior that led to need for PRN medication: pain  Staff interventions attempted prior to PRN being given: rest, repostioning  PRN medication given: Tylenol  Patient response/effectiveness of PRN medication: Well, will continue to monitor

## 2019-05-23 NOTE — WOUND CARE
Wound Care Note:     Follow-up visit for left foot wounds. Chart shows:  Admitted for suicidal intent   Past Medical History:   Diagnosis Date    Aneurysm [facility])     (with stroke)    Bladder tumor     Cancer ([facility] [address]     bladder CA    Chronic pain     Family history of bladder cancer     Gout     History of vascular access device 04/25/2019    [facility] VAT 4 FR MIDLINE R Cephalic Limited access    History of vascular access device 04/26/2019    4 fr Midline right Cephalic midline access    Hypercholesterolemia     Hypertension     Lesion of bladder     Seizures ([facility] [address]     Stroke ([facility] [address] 2006    left sided weakness    Thromboembolus ([facility] [address]     Thyroid disease     TIA (transient ischemic attack) 2012     WBC = 7.0 on 5/23/19  Admitted from [facility]    Assessment:   Patient is A&O x 4, communicative, continent with some assistance needed in repositioning. Bed: [facility]  Diet: Regular  Patient pre-medicated for pain by RN. Left heel skin ntact and without erythema. Right heel, buttocks and sacrum were not assessed; patient up in wheelchair. Palpable DP pulses bilaterally    1. POA left lateral ankle wound looks better today, less slough in wound bed, 70% pink and 30% slough, moderate amount of serous drainage with a hint of green drainage, ludwig-wound intact, wound edges are open. Aquacel AG, ABD pad and tape applied. 2.  POA left dorsal foot wound is pink granulation tissue, moderate amount of serous drainage, ludwig-wound intact with some blanchable erythema, wound edges are open. Aquacel AG gauze and tape. Patient in wheelchair. Recommendations:    Continue with current wound care    Left foot wounds (2)- Every other day cleanse with normal saline, wipe wound bed clean and dry, apply a piece of Aquacel AG to wound beds, cover ankle wound with ABD pad and dorsal foot with gauze and tape to secure.   Change dressing PRN with breakthrough drainage.     Skin Care & Pressure Prevention:  Minimize layers of linen/pads under patient to optimize support surface. Turn/reposition approximately every 2 hours and offload heels. Manage incontinence / promote continence     Discussed above plan with patient & Peggyann Nissen, RN    Transition of Care: Patient is being discharged, wound care will sign off.   Patient to see Dr. Cora Rollins, podiatrist.    Paco Espinoza" AIXA Skelton, RN, [facility], [facility].  office [phone]  pager [phone] or call  to page

## 2019-05-23 NOTE — BH NOTES
Behavioral Health Transition Record to Provider    Patient Name: Romulo Kovacs  YOB: 1965  Medical Record Number: [id number]  Date of Admission: 5/20/2019  Date of Discharge: 5/24/2019     Attending Provider: Sylvia Hernández MD  Discharging Provider: FRED Menjivar   To contact this individual call [phone] and ask the  to page. If unavailable, ask to be transferred to [address] [place] [address] Provider on call. [facility] Provider will be available on call 24/7 and during holidays. Primary Care Provider: Radha Aranda MD    Allergies   Allergen Reactions    Sulfamethoxazole-Trimethoprim Rash     L eye and L hand    Ciprofloxacin Hives     Per pcp records    Codeine Hives     Tolerates dilaudid, oxycodone    Cymbalta [Duloxetine] Other (comments)     Confusion and memory loss    Gabapentin Hives and Diarrhea    Lyrica [Pregabalin] Hives    Sulfa (Sulfonamide Antibiotics) Rash    Ultram [Tramadol] Hives    Vancomycin Shortness of Breath       Reason for Admission:   \"I'm in pain. \"     HISTORY OF PRESENT ILLNESS:  The patient is a 59-year-old male who is currently admitted in the inpatient psychiatric unit at [facility] on a temporary longterm order basis. He was transferred from [facility]. He presented to the emergency room with hip pain, suicidal ideation, and alcoholism. He is quite irritable during the interview.           Admission Diagnosis: Depression [F32.9]  Depression [F32.9]    * No surgery found *    Results for orders placed or performed during the hospital encounter of 52/42/29   METABOLIC PANEL, BASIC   Result Value Ref Range    Sodium 142 136 - 145 mmol/L    Potassium 3.7 3.5 - 5.1 mmol/L    Chloride 110 (H) 97 - 108 mmol/L    CO2 24 21 - 32 mmol/L    Anion gap 8 5 - 15 mmol/L    Glucose 95 65 - 100 mg/dL    BUN 9 6 - 20 MG/DL    Creatinine 0.63 (L) 0.70 - 1.30 MG/DL    BUN/Creatinine ratio 14 12 - 20      GFR est AA >60 >60 ml/min/1.73m2    GFR est non-AA >60 >60 ml/min/1.73m2    Calcium 8.8 8.5 - 10.1 MG/DL   CBC WITH AUTOMATED DIFF   Result Value Ref Range    WBC 7.0 4.1 - 11.1 K/uL    RBC 4.13 4.10 - 5.70 M/uL    HGB 11.7 (L) 12.1 - 17.0 g/dL    HCT 36.6 36.6 - 50.3 %    MCV 88.6 80.0 - 99.0 FL    MCH 28.3 26.0 - 34.0 PG    MCHC 32.0 30.0 - 36.5 g/dL    RDW 14.3 11.5 - 14.5 %    PLATELET 574 883 - 414 K/uL    MPV 11.4 8.9 - 12.9 FL    NRBC 0.0 0  WBC    ABSOLUTE NRBC 0.00 0.00 - 0.01 K/uL    NEUTROPHILS 43 32 - 75 %    LYMPHOCYTES 41 12 - 49 %    MONOCYTES 9 5 - 13 %    EOSINOPHILS 5 0 - 7 %    BASOPHILS 1 0 - 1 %    IMMATURE GRANULOCYTES 1 (H) 0.0 - 0.5 %    ABS. NEUTROPHILS 3.0 1.8 - 8.0 K/UL    ABS. LYMPHOCYTES 2.9 0.8 - 3.5 K/UL    ABS. MONOCYTES 0.6 0.0 - 1.0 K/UL    ABS. EOSINOPHILS 0.4 0.0 - 0.4 K/UL    ABS. BASOPHILS 0.1 0.0 - 0.1 K/UL    ABS. IMM. GRANS. 0.0 0.00 - 0.04 K/UL    DF AUTOMATED     EKG, 12 LEAD, INITIAL   Result Value Ref Range    Ventricular Rate 60 BPM    Atrial Rate 60 BPM    P-R Interval 178 ms    QRS Duration 92 ms    Q-T Interval 434 ms    QTC Calculation (Bezet) 434 ms    Calculated P Axis 23 degrees    Calculated R Axis 5 degrees    Calculated T Axis 25 degrees    Diagnosis       Normal sinus rhythm  Normal ECG  When compared with ECG of 15-MAR-2018 12:39,  No significant change was found  Confirmed by Yessica Gandhi M.D., Anola Mires ([id number]) on 5/21/2019 3:02:56 PM         Immunizations administered during this encounter:   Immunization History   Administered Date(s) Administered    Influenza Vaccine 10/01/2017       Screening for Metabolic Disorders for Patients on Antipsychotic Medications  (Data obtained from the EMR)    Estimated Body Mass Index  Estimated body mass index is 31.66 kg/m² as calculated from the following:    Height as of this encounter: 6' 1\" (1.854 m). Weight as of this encounter: 108.9 kg (240 lb).      Vital Signs/Blood Pressure  Visit Vitals  /71 (BP 1 Location: Right arm, BP Patient Position: Sitting)   Pulse 60   Temp 97.8 °F (36.6 °C)   Resp 16   Ht 6' 1\" (1.854 m)   Wt 108.9 kg (240 lb)   SpO2 97%   BMI 31.66 kg/m²       Blood Glucose/Hemoglobin A1c  Lab Results   Component Value Date/Time    Glucose 95 05/22/2019 07:10 AM    Glucose (POC) 150 (H) 03/19/2018 11:45 AM       Lab Results   Component Value Date/Time    Hemoglobin A1c 4.8 03/14/2018 04:29 AM        Lipid Panel  Lab Results   Component Value Date/Time    Cholesterol, total 152 12/13/2012 03:30 AM    HDL Cholesterol 26 12/13/2012 03:30 AM    LDL, calculated 92.2 12/13/2012 03:30 AM    Triglyceride 169 (H) 12/13/2012 03:30 AM    CHOL/HDL Ratio 5.8 (H) 12/13/2012 03:30 AM        Discharge Diagnosis: Depression     Discharge Plan: he will return home and follow up with the PCP. The patient Wilma  exhibits the ability to control behavior in a less restrictive environment. Patient's level of functioning is improving. No assaultive/destructive behavior has been observed for the past 24 hours. No suicidal/homicidal threat or behavior has been observed for the past 24 hours. There is no evidence of serious medication side effects. Patient has not been in physical or protective restraints for at least the past 24 hours. If weapons involved, how are they secured? No access to weapons     Is patient aware of and in agreement with discharge plan? Patient is aware of discharge and is in agreement     Arrangements for medication:  Prescription given to patient. Copy of discharge instructions to  provider?:  Yes, fax to PCP     Arrangements for transportation home:  Friend to      Keep all follow up appointments as scheduled, continue to take prescribed medications per physician instructions.   Mental health crisis number:  [id number] or your local mental health crisis line number at [phone]       Discharge Medication List and Instructions:   Current Discharge Medication List      CONTINUE these medications which have CHANGED    Details   sertraline (ZOLOFT) 50 mg tablet Take 1 Tab by mouth daily. Indications: major depressive disorder  Qty: 30 Tab, Refills: 0         CONTINUE these medications which have NOT CHANGED    Details   senna-docusate (DARELL-COLACE) 8.6-50 mg per tablet Take 1 Tab by mouth daily. lisinopril (PRINIVIL, ZESTRIL) 20 mg tablet Take 2 Tabs by mouth daily. Indications: high blood pressure  Qty: 30 Tab, Refills: 0      amLODIPine (NORVASC) 10 mg tablet Take 1 Tab by mouth daily. Qty: 30 Tab, Refills: 0      methadone (DOLOPHINE) 10 mg/mL solution Take 130 mg by mouth daily. colchicine 0.6 mg tablet Take 0.6 mg by mouth daily as needed (gout flare). acetaminophen (TYLENOL) 325 mg tablet Take 2 Tabs by mouth every four (4) hours as needed. Indications: Arthritic Pain  Qty: 30 Tab, Refills: 0      melatonin 3 mg tablet Take 1 Tab by mouth nightly as needed. Qty: 10 Tab, Refills: 0      levothyroxine (SYNTHROID) 100 mcg tablet Take 100 mcg by mouth Daily (before breakfast). tamsulosin (FLOMAX) 0.4 mg capsule Take 0.4 mg by mouth daily. Indications: bladder cancer      allopurinol (ZYLOPRIM) 100 mg tablet Take 1 Tab by mouth daily.   Qty: 30 Tab, Refills: 0         STOP taking these medications       gabapentin (NEURONTIN) 400 mg capsule Comments:   Reason for Stopping:         ibuprofen (MOTRIN) 600 mg tablet Comments:   Reason for Stopping:               Unresulted Labs (24h ago, onward)    None        To obtain results of studies pending at discharge, please contact [phone]    Follow-up Information     Follow up With Specialties Details Why Contact Info    Lynn Kaur DPM Podiatry Schedule an appointment as soon as possible for a visit on 5/30/2019 you have a 3:00 appointment  [phone]  [address] [facility]  Go to  Pili Lam MD Urology Schedule an appointment as soon as possible for a visit  [address]  [phone]      Skip Roblero MD Internal Medicine On 5/28/2019 you have a 8:40 appointment  [address] [place] [phone]            Advanced Directive:   Does the patient have an appointed surrogate decision maker? No  Does the patient have a Medical Advance Directive? No  Does the patient have a Psychiatric Advance Directive? No  If the patient does not have a surrogate or Medical Advance Directive AND Psychiatric Advance Directive, the patient was offered information on these advance directives Patient declined to complete    Patient Instructions: Please continue all medications until otherwise directed by physician. Tobacco Cessation Discharge Plan:   Is the patient a smoker and needs referral for smoking cessation? Yes  Patient referred to the following for smoking cessation with an appointment? Refused     Patient was offered medication to assist with smoking cessation at discharge? Refused  Was education for smoking cessation added to the discharge instructions? Yes    Alcohol/Substance Abuse Discharge Plan:   Does the patient have a history of substance/alcohol abuse and requires a referral for treatment? Yes  Patient referred to the following for substance/alcohol abuse treatment with an appointment? Refused  Patient was offered medication to assist with alcohol cessation at discharge? No  Was education for substance/alcohol abuse added to discharge instructions? No    Patient discharged to Home; discussed with patient/caregiver and provided to the patient/caregiver either in hard copy or electronically.

## 2019-05-23 NOTE — BH NOTES
GROUP THERAPY PROGRESS NOTE    Elvia Knox is participating in D/C Planning Group    Group time: 45 minutes    Personal goal for participation: Personal     Goal orientation: Dev- Effective Suport team    Group therapy participation: minimal    Therapeutic interventions reviewed and discussed:     Impression of participation: Pt was in attendance but limited due to  recent medications ( drowsy- nodding on / off) but when he was awake contributed to the group. Pt said his support team was limited to only a few persons. He acknowledged needing more help. Pt told peers that his pain limits his mobility.

## 2019-05-23 NOTE — PROGRESS NOTES
Pharmacist Discharge Medication Reconciliation    Discharging Provider: Delaney Gardner NP    Significant PMH:   Past Medical History:   Diagnosis Date    Aneurysm ([facility]     (with stroke)    Bladder tumor     Cancer ([facility]     bladder CA    Chronic pain     Family history of bladder cancer     Gout     History of vascular access device 04/25/2019    [facility] VAT 4 FR MIDLINE R Cephalic Limited access    History of vascular access device 04/26/2019    4 fr Midline right Cephalic midline access    Hypercholesterolemia     Hypertension     Lesion of bladder     [facility]     Stroke ([facility] 2006    left sided weakness    [facility]us [facility]     Thyroid disease     TIA (transient ischemic attack) 2012     Chief Complaint for this Admission: No chief complaint on file. Allergies: Sulfamethoxazole-trimethoprim; Ciprofloxacin; Codeine; Cymbalta [duloxetine]; Gabapentin; Lyrica [pregabalin]; Sulfa (sulfonamide antibiotics); Ultram [tramadol]; and Vancomycin    Discharge Medications:   Current Discharge Medication List        CONTINUE these medications which have CHANGED    Details   allopurinol (ZYLOPRIM) 100 mg tablet Take 1 Tab by mouth daily. Indications: treatment to prevent acute gout attack  Qty: 30 Tab, Refills: 0      levothyroxine (SYNTHROID) 100 mcg tablet Take 1 Tab by mouth Daily (before breakfast). Indications: hypothyroidism  Qty: 30 Tab, Refills: 0      tamsulosin (FLOMAX) 0.4 mg capsule Take 1 Cap by mouth daily. Indications: bladder cancer  Qty: 30 Cap, Refills: 0      sertraline (ZOLOFT) 50 mg tablet Take 1 Tab by mouth daily. Indications: major depressive disorder  Qty: 30 Tab, Refills: 0           CONTINUE these medications which have NOT CHANGED    Details   senna-docusate (DARELL-COLACE) 8.6-50 mg per tablet Take 1 Tab by mouth daily. lisinopril (PRINIVIL, ZESTRIL) 20 mg tablet Take 2 Tabs by mouth daily.  Indications: high blood pressure  Qty: 30 Tab, Refills: 0      amLODIPine (NORVASC) 10 mg tablet Take 1 Tab by mouth daily. Qty: 30 Tab, Refills: 0      methadone (DOLOPHINE) 10 mg/mL solution Take 130 mg by mouth daily. colchicine 0.6 mg tablet Take 0.6 mg by mouth daily as needed (gout flare). acetaminophen (TYLENOL) 325 mg tablet Take 2 Tabs by mouth every four (4) hours as needed. Indications: Arthritic Pain  Qty: 30 Tab, Refills: 0      melatonin 3 mg tablet Take 1 Tab by mouth nightly as needed.   Qty: 10 Tab, Refills: 0           STOP taking these medications       gabapentin (NEURONTIN) 400 mg capsule Comments:   Reason for Stopping:         ibuprofen (MOTRIN) 600 mg tablet Comments:   Reason for Stopping:             The patient's chart, MAR and AVS were reviewed by Jamin Chapman, PHARMD.

## 2019-05-23 NOTE — BH NOTES
26 pt stated \"this is no way to live\" and \"I can not live like this\" while nurse was in the room giving him his medications and drawing blood.

## 2019-05-24 PROCEDURE — 74011250637 HC RX REV CODE- 250/637: Performed by: PSYCHIATRY & NEUROLOGY

## 2019-05-24 PROCEDURE — 74011250637 HC RX REV CODE- 250/637: Performed by: NURSE PRACTITIONER

## 2019-05-24 PROCEDURE — 74011250637 HC RX REV CODE- 250/637: Performed by: INTERNAL MEDICINE

## 2019-05-24 PROCEDURE — 65220000003 HC RM SEMIPRIVATE PSYCH

## 2019-05-24 RX ORDER — LEVOTHYROXINE SODIUM 100 UG/1
100 TABLET ORAL
Qty: 30 TAB | Refills: 0 | Status: ON HOLD | OUTPATIENT
Start: 2019-05-24 | End: 2020-10-21

## 2019-05-24 RX ORDER — ALLOPURINOL 100 MG/1
100 TABLET ORAL DAILY
Qty: 30 TAB | Refills: 0 | Status: ON HOLD | OUTPATIENT
Start: 2019-05-24 | End: 2022-10-21

## 2019-05-24 RX ORDER — TAMSULOSIN HYDROCHLORIDE 0.4 MG/1
0.4 CAPSULE ORAL DAILY
Qty: 30 CAP | Refills: 0 | Status: SHIPPED | OUTPATIENT
Start: 2019-05-24 | End: 2020-07-08 | Stop reason: SDUPTHER

## 2019-05-24 RX ADMIN — METHADONE HYDROCHLORIDE 130 MG: 5 SOLUTION ORAL at 09:31

## 2019-05-24 RX ADMIN — ACETAMINOPHEN 650 MG: 325 TABLET ORAL at 14:26

## 2019-05-24 RX ADMIN — TRAZODONE HYDROCHLORIDE 50 MG: 50 TABLET ORAL at 22:58

## 2019-05-24 RX ADMIN — TAMSULOSIN HYDROCHLORIDE 0.4 MG: 0.4 CAPSULE ORAL at 09:36

## 2019-05-24 RX ADMIN — ACETAMINOPHEN 650 MG: 325 TABLET ORAL at 01:24

## 2019-05-24 RX ADMIN — SERTRALINE HYDROCHLORIDE 50 MG: 50 TABLET ORAL at 09:37

## 2019-05-24 RX ADMIN — ALLOPURINOL 100 MG: 100 TABLET ORAL at 09:38

## 2019-05-24 RX ADMIN — LEVOTHYROXINE SODIUM 100 MCG: 100 TABLET ORAL at 09:42

## 2019-05-24 RX ADMIN — ACETAMINOPHEN 650 MG: 325 TABLET ORAL at 20:59

## 2019-05-24 NOTE — PROGRESS NOTES
Problem: Falls - Risk of  Goal: *Absence of Falls  Description  Document Jenelle Recinos Fall Risk and appropriate interventions in the flowsheet. 5/24/2019 1535 by Amy Chambers RN  Outcome: Progressing Towards Goal  Note:   Fall Risk Interventions:  Mobility Interventions: Communicate number of staff needed for ambulation/transfer, Patient to call before getting OOB, Utilize walker, cane, or other assistive device         Medication Interventions: Teach patient to arise slowly         History of Falls Interventions: Door open when patient unattended, Evaluate medications/consider consulting pharmacy, Room close to nurse's station         Problem: Depressed Mood (Adult/Pediatric)  Goal: *STG: Attends activities and groups  Outcome: Progressing Towards Goal  Note:   Compliant      Problem: Depressed Mood (Adult/Pediatric)  Goal: *STG: Remains safe in hospital  Outcome: Progressing Towards Goal  Note:   No S/I or H/I      Problem: Depressed Mood (Adult/Pediatric)  Goal: *STG: Participates in treatment plan  5/24/2019 1535 by Amy Chambers RN  Outcome: Not Progressing Towards Goal  Note:   Pt is calm and cooperative   Withdrawn and subdued   Goal is be discharged. Patients ride is suppose to come in today. Q 15 min checks   NAD noted   Will continue to monitor.

## 2019-05-24 NOTE — PROGRESS NOTES
Problem: Depressed Mood (Adult/Pediatric)  Goal: *STG: Participates in treatment plan  Note:   Patient mood is depressed and sad. Patient denies SI. Med and meal compliant. Patient goal: \"to go home\" Staff goal: to assist with ADLS. Problem: Discharge Planning  Goal: *Knowledge of medication management  Note:   Medication management. Q 15 min safety rounds.  Group therapy

## 2019-05-24 NOTE — PROGRESS NOTES
PSYCHIATRIC PROGRESS NOTE    Chief Complaint:  \"I feel better. \"    Interval History:  Yamileth Rausch states he is doing better. His ride didn't come yesterday and so discharge was cancelled. Denies si hi or avh. States he is ready to go home. Sleeping and eating ok. He tells me that his ride will be here today.       Past Medical History:  Past Medical History:   Diagnosis Date    Aneurysm ([facility]     (with stroke)    Bladder tumor     Cancer ([facility]     bladder CA    Chronic pain     Family history of bladder cancer     Gout     History of vascular access device 04/25/2019    [facility] VAT 4 FR MIDLINE R Cephalic Limited access    History of vascular access device 04/26/2019    4 fr Midline right Cephalic midline access    Hypercholesterolemia     Hypertension     Lesion of bladder     Seizures ([facility]     Stroke ([facility] 2006    left sided weakness    [facility]bo[facility]     Thyroid disease     TIA (transient ischemic attack) 2012         ALLERGIES:(reviewed/updated 5/24/2019)  Allergies   Allergen Reactions    Sulfamethoxazole-Trimethoprim Rash     L eye and L hand    Ciprofloxacin Hives     Per pcp records    Codeine Hives     Tolerates dilaudid, oxycodone    Cymbalta [Duloxetine] Other (comments)     Confusion and memory loss    Gabapentin Hives and Diarrhea    Lyrica [Pregabalin] Hives    Sulfa (Sulfonamide Antibiotics) Rash    Ultram [Tramadol] Hives    Vancomycin Shortness of Breath       Laboratory report:  Lab Results   Component Value Date/Time    WBC 7.0 05/23/2019 07:28 AM    HGB 11.7 (L) 05/23/2019 07:28 AM    HCT 36.6 05/23/2019 07:28 AM    PLATELET 227 53/85/7064 07:28 AM    MCV 88.6 05/23/2019 07:28 AM      Lab Results   Component Value Date/Time    Sodium 142 05/22/2019 07:10 AM    Potassium 3.7 05/22/2019 07:10 AM    Chloride 110 (H) 05/22/2019 07:10 AM    CO2 24 05/22/2019 07:10 AM    Anion gap 8 05/22/2019 07:10 AM    Glucose 95 05/22/2019 07:10 AM    BUN 9 05/22/2019 07:10 AM    Creatinine 0.63 (L) 05/22/2019 07:10 AM    BUN/Creatinine ratio 14 05/22/2019 07:10 AM    GFR est AA >60 05/22/2019 07:10 AM    GFR est non-AA >60 05/22/2019 07:10 AM    Calcium 8.8 05/22/2019 07:10 AM    Bilirubin, total 0.3 05/20/2019 05:04 AM    AST (SGOT) 38 (H) 05/20/2019 05:04 AM    Alk. phosphatase 124 (H) 05/20/2019 05:04 AM    Protein, total 7.4 05/20/2019 05:04 AM    Albumin 3.7 05/20/2019 05:04 AM    Globulin 3.7 05/20/2019 05:04 AM    A-G Ratio 1.0 (L) 05/20/2019 05:04 AM    ALT (SGPT) 36 05/20/2019 05:04 AM      Vitals:    05/23/19 0858 05/23/19 1608 05/23/19 1942 05/24/19 0747   BP: 141/86 132/89 101/71 (!) 147/91   Pulse: (!) 102 66 60 (!) 58   Resp: 16 16 16 16   Temp: 97.3 °F (36.3 °C) 97.7 °F (36.5 °C) 97.8 °F (36.6 °C) 98.6 °F (37 °C)   SpO2: 96% 96% 97% 97%   Weight:       Height:           Lab Results   Component Value Date/Time    Valproic acid 99 10/10/2016 04:23 AM     No results found for: LITH    Vital Signs  Patient Vitals for the past 24 hrs:   Temp Pulse Resp BP SpO2   05/24/19 0747 98.6 °F (37 °C) (!) 58 16 (!) 147/91 97 %   05/23/19 1942 97.8 °F (36.6 °C) 60 16 101/71 97 %   05/23/19 1608 97.7 °F (36.5 °C) 66 16 132/89 96 %     Wt Readings from Last 3 Encounters:   05/20/19 108.9 kg (240 lb)   05/20/19 108.9 kg (240 lb)   04/25/19 108.9 kg (240 lb)     Temp Readings from Last 3 Encounters:   05/24/19 98.6 °F (37 °C)   05/20/19 98 °F (36.7 °C)   05/10/19 97.9 °F (36.6 °C)     BP Readings from Last 3 Encounters:   05/24/19 (!) 147/91   05/20/19 98/63   05/10/19 151/78     Pulse Readings from Last 3 Encounters:   05/24/19 (!) 58   05/20/19 76   05/10/19 60       Radiology (reviewed/updated 5/24/2019)  Xr Foot Lt Ap/lat    Result Date: 4/23/2019  EXAM: XR FOOT LT AP/LAT INDICATION: osteo? . COMPARISON: 2018 FINDINGS: Two views of the left foot demonstrate osteopenia. No bony destructive lesion is identified. . There is slight sclerosis left fifth distal metatarsal similar to the prior study. Lito Jean Baptiste IMPRESSION: Osteopenia. No bony destructive lesion is identified. Follow-up study may be helpful to assess for interval change. .    Ct Head Wo Cont    Result Date: 5/20/2019  EXAMINATION:  CT HEAD WO CONT CLINICAL INFORMATION:  fall COMPARISON:  2/7/2013 TECHNIQUE: Routine axial head CT was performed. IV contrast was not administered. Sagittal and coronal reconstructions were generated. CT dose reduction was achieved through use of a standardized protocol tailored for this examination and automatic exposure control for dose modulation. FINDINGS: No acute infarct, hemorrhage or mass. VENTRICULAR SYSTEM:  Unchanged ventricular size, without hydrocephalus. Unchanged ventricular asymmetry with mild to moderate enlargement of the right lateral ventricle. BASAL CISTERNS:  Patent. 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 MIDLINE SHIFT:  None. CALVARIUM/ SKULL BASE: Intact. Left parietal scalp laceration and swelling. PARANASAL SINUSES AND MASTOID AIR CELLS: Clear. VISUALIZED ORBITS: No significant abnormalities. SELLA: No enlargement. IMPRESSION:  No acute intracranial abnormalities. No change since 2/7/2013. Ct Low Ext Lt W Cont    Result Date: 4/25/2019  EXAM: CT LOW EXT LT W CONT INDICATION: Left foot infection COMPARISON: Radiographs 4/23/2018. MRI 3/15/2018 CONTRAST: 100 mL of Isovue-300. TECHNIQUE: Helical CT of the left foot during uneventful rapid bolus intravenous contrast administration. Coronal and Sagittal reformats were generated. Images reviewed in soft tissue and bone windows. CT dose reduction was achieved through the use of a standardized protocol tailored for this examination and automatic exposure control for dose modulation. FINDINGS: Bones: The bones are osteopenic. No fracture, dislocation, or periosteal reaction.  Chronic ossicles adjacent to the tip of the medial malleolus likely related to prior injury. Joint fluid: No significant effusion. Articulations: No significant osteoarthritis. No evidence of inflammatory arthritis. Tendons: There is thickening of the Achilles tendon related to tendinosis. Several chronic calcifications are seen along the medial margin likely related to heterotopic ossification. Muscles: There is partial atrophy of the musculature Soft tissue mass: No mass. Bandaging is seen over the dorsum of the forefoot and over the medial side of the ankle. There is diffuse skin thickening and subcutaneous edema. No evidence of a focal drainable fluid collection. IMPRESSION: Diffuse skin thickening and subcutaneous edema. No evidence of a focal drainable fluid collection. No evidence of osteomyelitis. Us Retroperitoneum Comp    Result Date: 5/21/2019  EXAM:  US RETROPERITONEUM COMP INDICATION:  ERAL COMPARISON: None. TECHNIQUE: Real-time sonography of the kidneys, retroperitoneum and bladder was performed with multiple static images obtained. FINDINGS: The kidneys have normal echogenicity with no mass, stone or hydronephrosis. The right kidney measures 10.3 cm and the left kidney measures 11 cm in length. The aorta is obscured by bowel gas  The proximal iliac arteries are obscured by bowel gas  The IVC is normal. No retroperitoneal mass is identified. The urinary bladder is moderately distended. Kidneys are within normal limits. Xr Us Guided Vascular Access    Result Date: 4/29/2019  INDICATION:   NO VENOUS ACCESS  EXAMINATION:  US GUIDE VAS ACCESS FINDINGS: Ultrasound was provided for Midline placement. IMPRESSION: Ultrasound guidance for Midline  placement. GBP    Xr Us Guided Vascular Access    Result Date: 4/25/2019  INDICATION:   NO VENOUS ACCESS  EXAMINATION:  US GUIDE VAS ACCESS FINDINGS: Ultrasound was provided for Midline placement. IMPRESSION: Ultrasound guidance for Midline  placement.  GBP      Side Effects: (reviewed/updated 5/24/2019)  None reported or admitted to.    Review of Systems: (reviewed/updated 5/24/2019)  Appetite: good  Sleep: good   All other Review of Systems: negative    Mental Status Exam:  Eye contact: Good eye contact  Psychomotor activity: Relaxed   Speech is spontaneous  Thought process: Logical and goal directed  Mood is \"ok\"  Affect: Blunted  Perception: No avh  Suicidal ideation: No si  Homicidal ideation: No hi  Insight/judgment: Poor  Cognition is grossly intact      Physical Exam:  Musculoskeletal system: steady gait  Tremor not present  Cog wheeling not present      Assessment and Plan:  Jaden Ascencio meets criteria for a diagnosis of unspecified mood disorder. Continue current medications as prescribed. We will closely monitor for safety. We will encourage reality orientation. Plan for discharge today. I certify that this patients inpatient psychiatric hospital services furnished since the previous certification were, and continue to be, required for treatment that could reasonably be expected to improve the patient's condition, or for diagnostic study, and that the patient continues to need, on a daily basis, active treatment furnished directly by or requiring the supervision of inpatient psychiatric facility personnel. In addition, the hospital records show that services furnished were intensive treatment services, admission or related services, or equivalent services.       Signed:  Diane Drake NP  5/24/2019

## 2019-05-24 NOTE — PROGRESS NOTES
1500: patient med/meal and group compliant. Staff focused on discharge.        Problem: Depressed Mood (Adult/Pediatric)  Goal: *STG: Participates in treatment plan  Outcome: Progressing Towards Goal  Goal: *STG: Attends activities and groups  Outcome: Progressing Towards Goal  Goal: *STG: Complies with medication therapy  Outcome: Progressing Towards Goal

## 2019-05-24 NOTE — BH NOTES
"Chief Complaint   Patient presents with     Physical       Initial /72  Pulse 78  Temp 97  F (36.1  C) (Oral)  Ht 5' 6.14\" (1.68 m)  Wt 148 lb 8 oz (67.4 kg)  SpO2 99%  BMI 23.87 kg/m2 Estimated body mass index is 23.87 kg/(m^2) as calculated from the following:    Height as of this encounter: 5' 6.14\" (1.68 m).    Weight as of this encounter: 148 lb 8 oz (67.4 kg).  Medication Reconciliation: complete   Karyn REYES MA      " PRN Medication Documentation    Specific patient behavior that led to need for PRN medication: complained of pain   Staff interventions attempted prior to PRN being given: rest and medication had to be given in 4 hours  PRN medication given: Tylenol  Patient response/effectiveness of PRN medication: returned to sleep within 30 minutes

## 2019-05-24 NOTE — PROGRESS NOTES
Problem: Falls - Risk of  Goal: *Absence of Falls  Description  Document Ivan Villaseñor Fall Risk and appropriate interventions in the flowsheet.   Outcome: Progressing Towards Goal  Note:   Fall Risk Interventions:  Mobility Interventions: Communicate number of staff needed for ambulation/transfer, Patient to call before getting OOB, Utilize walker, cane, or other assistive device         Medication Interventions: Teach patient to arise slowly         History of Falls Interventions: Door open when patient unattended, Evaluate medications/consider consulting pharmacy    Problem: Depressed Mood (Adult/Pediatric)  Goal: *STG: Attends activities and groups  Outcome: Progressing Towards Goal  Note:   Compliant   Problem: Depressed Mood (Adult/Pediatric)  Goal: *STG: Participates in treatment plan  Outcome: Not Progressing Towards Goal  Note:   Pt is depressed, sad and dull   Patients goal is for discharge today   Denies S/I and H/I   Q 15 min checks   Problem: Depressed Mood (Adult/Pediatric)  Goal: *STG: Complies with medication therapy  Outcome: Progressing Towards Goal  Note:   Compliant

## 2019-05-24 NOTE — INTERDISCIPLINARY ROUNDS
Behavioral Health Interdisciplinary Rounds     Patient Name: Olga Amado  Age: 46 y.o. Room/Bed:  740/02  Primary Diagnosis: <principal problem not specified>   Admission Status: Involuntary Commitment     Readmission within 30 days: no  Power of  in place: no  Patient requires a blocked bed: no          Reason for blocked bed:     VTE Prophylaxis: Not indicated    Mobility needs/Fall risk: yes  Flu Vaccine : not flu season   Nutritional Plan: no  Consults:          Labs/Testing due today?: no    Sleep hours:        Participation in Care/Groups:  yes  Medication Compliant?: Yes  PRNS (last 24 hours): Antianxiety, Sleep Aid and Pain    Restraints (last 24 hours):  no     CIWA (range last 24 hours):     COWS (range last 24 hours):      Alcohol screening (AUDIT) completed -   AUDIT Score: 0     If applicable, date SBIRT discussed in treatment team AND documented:   AUDIT Screen Score: AUDIT Score: 0    Tobacco - patient is a smoker: Have You Used Tobacco in the Past 30 Days: Yes  Illegal Drugs use: Have You Used Any Illegal Substances Over the Past 12 Months: No    24 hour chart check complete: yes     Patient goal(s) for today:   Treatment team focus/goals: plan for discharge today   Progress note He was unable to reach his ride and partner yesterday and did not have a way into his home. She will  today . Patient spoke to [facility] this morning.      LOS:  4  Expected LOS:TBD    Financial concerns/prescription coverage:  Medicaid   Date of last family contact:     Family requesting physician contact today:    Discharge plan: he will return home   Guns in the home: no         Outpatient provider(s): follow up with his PCP     Participating treatment team members: David Farmer SW

## 2019-05-25 PROCEDURE — 74011250637 HC RX REV CODE- 250/637: Performed by: NURSE PRACTITIONER

## 2019-05-25 PROCEDURE — 74011250637 HC RX REV CODE- 250/637: Performed by: INTERNAL MEDICINE

## 2019-05-25 PROCEDURE — 74011250637 HC RX REV CODE- 250/637: Performed by: PSYCHIATRY & NEUROLOGY

## 2019-05-25 PROCEDURE — 65220000003 HC RM SEMIPRIVATE PSYCH

## 2019-05-25 RX ADMIN — SERTRALINE HYDROCHLORIDE 50 MG: 50 TABLET ORAL at 08:23

## 2019-05-25 RX ADMIN — TRAZODONE HYDROCHLORIDE 50 MG: 50 TABLET ORAL at 22:07

## 2019-05-25 RX ADMIN — HYDROXYZINE HYDROCHLORIDE 50 MG: 50 TABLET, FILM COATED ORAL at 03:13

## 2019-05-25 RX ADMIN — LEVOTHYROXINE SODIUM 100 MCG: 100 TABLET ORAL at 06:48

## 2019-05-25 RX ADMIN — ALLOPURINOL 100 MG: 100 TABLET ORAL at 08:26

## 2019-05-25 RX ADMIN — TAMSULOSIN HYDROCHLORIDE 0.4 MG: 0.4 CAPSULE ORAL at 08:23

## 2019-05-25 RX ADMIN — HYDROXYZINE HYDROCHLORIDE 50 MG: 50 TABLET, FILM COATED ORAL at 17:46

## 2019-05-25 RX ADMIN — ACETAMINOPHEN 650 MG: 325 TABLET ORAL at 03:13

## 2019-05-25 RX ADMIN — AMLODIPINE BESYLATE 10 MG: 5 TABLET ORAL at 08:23

## 2019-05-25 RX ADMIN — ACETAMINOPHEN 650 MG: 325 TABLET ORAL at 14:35

## 2019-05-25 RX ADMIN — ACETAMINOPHEN 650 MG: 325 TABLET ORAL at 17:46

## 2019-05-25 RX ADMIN — METHADONE HYDROCHLORIDE 130 MG: 5 SOLUTION ORAL at 06:48

## 2019-05-25 NOTE — BH NOTES
GROUP THERAPY PROGRESS NOTE    Silvia Valadez is participating in relaxation group. Group time: 30 minutes    Personal goal for participation: soft music and time for relaxation with dinner    Goal orientation: relaxation    Group therapy participation: passive    Therapeutic interventions reviewed and discussed: relaxation     Impression of participation: pt pleasantly confused while eating dinner. WITHOUT DISTRESS.CALL LIGHT IN REACH

## 2019-05-25 NOTE — BH NOTES
PRN Medication Documentation    Specific patient behavior that led to need for PRN medication: generalized aching  Staff interventions attempted prior to PRN being given: repositioned  PRN medication given: Tylenol  Patient response/effectiveness of PRN medication: well, will continue to monitor    PRN Medication Documentation    Specific patient behavior that led to need for PRN medication: insomnia  Staff interventions attempted prior to PRN being given: rest. quiet  PRN medication given: Trazodone  Patient response/effectiveness of PRN medication: Well, will continue to monitor

## 2019-05-25 NOTE — BH NOTES
1811 PRN Medication Documentation    Specific patient behavior that led to need for PRN medication: pt asks for atarax and tylenol for c/o anxiety and pain of LLE    Staff interventions attempted prior to PRN being given: decrease stimuli, give emotional support, encourage pt to share feelings, encourage fluids, encourage elevation of LLE, offer positive distraction activities  PRN medication given: tylenol and atarax  Patient response/effectiveness of PRN medication: to be assessed. 1848 pt states pain and anxiety are less.

## 2019-05-25 NOTE — INTERDISCIPLINARY ROUNDS
Behavioral Health Interdisciplinary Rounds     Patient Name: Flor Conroy  Age: 46 y.o. Room/Bed:  744/02  Primary Diagnosis: <principal problem not specified>   Admission Status: Involuntary Commitment     Readmission within 30 days: no  Power of  in place: no  Patient requires a blocked bed: no          Reason for blocked bed:     VTE Prophylaxis: Not indicated    Mobility needs/Fall risk: yes  Flu Vaccine : not flu season   Nutritional Plan: no  Consults:          Labs/Testing due today?: no    Sleep hours: 4.5       Participation in Care/Groups:    Medication Compliant?: Yes  PRNS (last 24 hours): Sleep Aid and Pain and atarax    Restraints (last 24 hours):  no     CIWA (range last 24 hours):     COWS (range last 24 hours):      Alcohol screening (AUDIT) completed -   AUDIT Score: 0     If applicable, date SBIRT discussed in treatment team AND documented:   AUDIT Screen Score: AUDIT Score: 0      Document Brief Intervention (corresponds directly with the 5 A's, Ask, Advise, Assess, Assist, and Arrange): At- Risk Patients (Score 7-15 for women; 8-15 for men)  Discuss concern patient is drinking at unhealthy levels known to increase risk of alcohol-related health problems. Is Patient ready to commit to change? If No:   Encourage reflection   Discuss short term and long term health risks of consuming alcohol   Barriers to change   Reaffirm willingness to help / Educational materials provided  If Yes:   Set goal  Gamida Cell provided    Harmful use or Dependence (Score 16 or greater)   Discuss short term and long term health risks of consuming alcohol   Recommendations   Negotiate drinking goal   Recommend addiction specialist/center   Arrange follow-up appointments.     Tobacco - patient is a smoker: Have You Used Tobacco in the Past 30 Days: Yes  Illegal Drugs use: Have You Used Any Illegal Substances Over the Past 12 Months: No    24 hour chart check complete: yes     Patient goal(s) for today:   Treatment team focus/goals:   Progress note     LOS:  5  Expected LOS:     Financial concerns/prescription coverage:    Date of last family contact:       Family requesting physician contact today:    Discharge plan:   Guns in the home:         Outpatient provider(s):     Participating treatment team members: Jenni Sharma, * (assigned SW),

## 2019-05-25 NOTE — PROGRESS NOTES
Problem: Falls - Risk of  Goal: *Absence of Falls  Description  Document Luisa Amado Fall Risk and appropriate interventions in the flowsheet. Note:   Fall Risk Interventions:  Mobility Interventions: Utilize walker, cane, or other assistive device         Medication Interventions: Teach patient to arise slowly         History of Falls Interventions: Door open when patient unattended, Evaluate medications/consider consulting pharmacy         Problem: Depressed Mood (Adult/Pediatric)  Goal: *STG: Remains safe in hospital  Outcome: Progressing Towards Goal  Note:   No S/I or H/I      Problem: Depressed Mood (Adult/Pediatric)  Goal: *STG: Complies with medication therapy  Outcome: Progressing Towards Goal  Note:   Compliant      Problem: Depressed Mood (Adult/Pediatric)  Goal: *STG: Participates in treatment plan  Outcome: Not Progressing Towards Goal  Note:   Pt is depressed anxious and sad. Patients goal is for discharge  Unable to get in touch with family members/friends to be discharged  Staff encouragement offered   NAD noted   Will continue to monitor.

## 2019-05-25 NOTE — PROGRESS NOTES
Problem: Falls - Risk of  Goal: *Absence of Falls  Description  Document Aleja Latin Fall Risk and appropriate interventions in the flowsheet.   Outcome: Progressing Towards Goal  Note:   Fall Risk Interventions:  Mobility Interventions: Communicate number of staff needed for ambulation/transfer, Utilize walker, cane, or other assistive device         Medication Interventions: Teach patient to arise slowly         History of Falls Interventions: Door open when patient unattended

## 2019-05-25 NOTE — BH NOTES
PRN Medication Documentation    Specific patient behavior that led to need for PRN medication: restless, anxious, roommate snoring  Staff interventions attempted prior to PRN being given: dark room  PRN medication given: atarax  Patient response/effectiveness of PRN medication: well, will continue to monitor    PRN Medication Documentation    Specific patient behavior that led to need for PRN medication: left side generalized aching  Staff interventions attempted prior to PRN being given: repositioning  PRN medication given: tylenol  Patient response/effectiveness of PRN medication: well, will continue to monitor

## 2019-05-26 PROCEDURE — 74011250637 HC RX REV CODE- 250/637: Performed by: PSYCHIATRY & NEUROLOGY

## 2019-05-26 PROCEDURE — 65220000003 HC RM SEMIPRIVATE PSYCH

## 2019-05-26 PROCEDURE — 74011250637 HC RX REV CODE- 250/637: Performed by: INTERNAL MEDICINE

## 2019-05-26 PROCEDURE — 74011250637 HC RX REV CODE- 250/637: Performed by: NURSE PRACTITIONER

## 2019-05-26 RX ADMIN — METHADONE HYDROCHLORIDE 130 MG: 5 SOLUTION ORAL at 06:15

## 2019-05-26 RX ADMIN — AMLODIPINE BESYLATE 10 MG: 5 TABLET ORAL at 08:28

## 2019-05-26 RX ADMIN — ACETAMINOPHEN 650 MG: 325 TABLET ORAL at 20:31

## 2019-05-26 RX ADMIN — SERTRALINE HYDROCHLORIDE 50 MG: 50 TABLET ORAL at 08:28

## 2019-05-26 RX ADMIN — ALLOPURINOL 100 MG: 100 TABLET ORAL at 08:28

## 2019-05-26 RX ADMIN — TRAZODONE HYDROCHLORIDE 50 MG: 50 TABLET ORAL at 21:53

## 2019-05-26 RX ADMIN — TAMSULOSIN HYDROCHLORIDE 0.4 MG: 0.4 CAPSULE ORAL at 08:28

## 2019-05-26 RX ADMIN — ACETAMINOPHEN 650 MG: 325 TABLET ORAL at 16:27

## 2019-05-26 RX ADMIN — SENNOSIDES,DOCUSATE SODIUM 1 TABLET: 8.6; 5 TABLET, FILM COATED ORAL at 08:28

## 2019-05-26 RX ADMIN — ACETAMINOPHEN 650 MG: 325 TABLET ORAL at 03:22

## 2019-05-26 RX ADMIN — ACETAMINOPHEN 650 MG: 325 TABLET ORAL at 08:31

## 2019-05-26 RX ADMIN — LEVOTHYROXINE SODIUM 100 MCG: 100 TABLET ORAL at 06:11

## 2019-05-26 NOTE — PROGRESS NOTES
Problem: Falls - Risk of  Goal: *Absence of Falls  Description  Document Latricia Roldan Fall Risk and appropriate interventions in the flowsheet. Outcome: Progressing Towards Goal     Problem: Patient Education: Go to Patient Education Activity  Goal: Patient/Family Education  Outcome: Progressing Towards Goal   Will continue q 15 minute checks for safety.

## 2019-05-26 NOTE — PROGRESS NOTES
PSYCHIATRIC PROGRESS NOTE    Chief Complaint:  \"My girlfriend didn't come. \"    Interval History:  Louis Osorio reports he feels ok, states his girlfriend was unable to pick him up since her mother had a stroke. He spoke to him today, and will be here tomorrow at noon. Denies si hi or avh. Calm and pleasant. Complaining of pain, already on methadone.       Past Medical History:  Past Medical History:   Diagnosis Date    Aneurysm ([facility] [address]     (with stroke)    Bladder tumor     Cancer ([facility] [address]     bladder CA    Chronic pain     Family history of bladder cancer     Gout     History of vascular access device 04/25/2019    [facility] VAT 4 FR MIDLINE R Cephalic Limited access    History of vascular access device 04/26/2019    4 fr Midline right Cephalic midline access    Hypercholesterolemia     Hypertension     Lesion of bladder     Seizures ([facility] [address]     Stroke ([facility] [address] 2006    left sided weakness    Thromboembolus ([facility] [address] [facility]     Thyroid disease     TIA (transient ischemic attack) 2012         ALLERGIES:(reviewed/updated 5/25/2019)  Allergies   Allergen Reactions    Sulfamethoxazole-Trimethoprim Rash     L eye and L hand    Ciprofloxacin Hives     Per pcp records    Codeine Hives     Tolerates dilaudid, oxycodone    Cymbalta [Duloxetine] Other (comments)     Confusion and memory loss    Gabapentin Hives and Diarrhea    Lyrica [Pregabalin] Hives    Sulfa (Sulfonamide Antibiotics) Rash    Ultram [Tramadol] Hives    Vancomycin Shortness of Breath       Laboratory report:  Lab Results   Component Value Date/Time    WBC 7.0 05/23/2019 07:28 AM    HGB 11.7 (L) 05/23/2019 07:28 AM    HCT 36.6 05/23/2019 07:28 AM    PLATELET 259 07/44/5934 07:28 AM    MCV 88.6 05/23/2019 07:28 AM      Lab Results   Component Value Date/Time    Sodium 142 05/22/2019 07:10 AM    Potassium 3.7 05/22/2019 07:10 AM    Chloride 110 (H) 05/22/2019 07:10 AM    CO2 24 05/22/2019 07:10 AM    Anion gap 8 05/22/2019 07:10 AM    Glucose 95 05/22/2019 07:10 AM BUN 9 05/22/2019 07:10 AM    Creatinine 0.63 (L) 05/22/2019 07:10 AM    BUN/Creatinine ratio 14 05/22/2019 07:10 AM    GFR est AA >60 05/22/2019 07:10 AM    GFR est non-AA >60 05/22/2019 07:10 AM    Calcium 8.8 05/22/2019 07:10 AM    Bilirubin, total 0.3 05/20/2019 05:04 AM    AST (SGOT) 38 (H) 05/20/2019 05:04 AM    Alk. phosphatase 124 (H) 05/20/2019 05:04 AM    Protein, total 7.4 05/20/2019 05:04 AM    Albumin 3.7 05/20/2019 05:04 AM    Globulin 3.7 05/20/2019 05:04 AM    A-G Ratio 1.0 (L) 05/20/2019 05:04 AM    ALT (SGPT) 36 05/20/2019 05:04 AM      Vitals:    05/24/19 2008 05/25/19 0709 05/25/19 1543 05/25/19 1856   BP: 165/50 132/71 107/72 116/75   Pulse: (!) 54 69 (!) 58 63   Resp: 16 18 18 20   Temp: 98.4 °F (36.9 °C) 97.6 °F (36.4 °C) 98.3 °F (36.8 °C) 97.8 °F (36.6 °C)   SpO2: 98% 98% 95% 98%   Weight:       Height:           Lab Results   Component Value Date/Time    Valproic acid 99 10/10/2016 04:23 AM     No results found for: [facility]    Vital Signs  Patient Vitals for the past 24 hrs:   Temp Pulse Resp BP SpO2   05/25/19 1856 97.8 °F (36.6 °C) 63 20 116/75 98 %   05/25/19 1543 98.3 °F (36.8 °C) (!) 58 18 107/72 95 %   05/25/19 0709 97.6 °F (36.4 °C) 69 18 132/71 98 %     Wt Readings from Last 3 Encounters:   05/20/19 108.9 kg (240 lb)   05/20/19 108.9 kg (240 lb)   04/25/19 108.9 kg (240 lb)     Temp Readings from Last 3 Encounters:   05/25/19 97.8 °F (36.6 °C)   05/20/19 98 °F (36.7 °C)   05/10/19 97.9 °F (36.6 °C)     BP Readings from Last 3 Encounters:   05/25/19 116/75   05/20/19 98/63   05/10/19 151/78     Pulse Readings from Last 3 Encounters:   05/25/19 63   05/20/19 76   05/10/19 60       Radiology (reviewed/updated 5/25/2019)  Xr Foot Lt Ap/lat    Result Date: 4/23/2019  EXAM: XR FOOT LT AP/LAT INDICATION: osteo? . COMPARISON: 2018 FINDINGS: Two views of the left foot demonstrate osteopenia. No bony destructive lesion is identified. . There is slight sclerosis left fifth distal metatarsal similar to the prior study. .     IMPRESSION: Osteopenia. No bony destructive lesion is identified. Follow-up study may be helpful to assess for interval change. .    Ct Head Wo Cont    Result Date: 5/20/2019  EXAMINATION:  CT HEAD WO CONT CLINICAL INFORMATION:  fall COMPARISON:  2/7/2013 TECHNIQUE: Routine axial head CT was performed. IV contrast was not administered. Sagittal and coronal reconstructions were generated. CT dose reduction was achieved through use of a standardized protocol tailored for this examination and automatic exposure control for dose modulation. FINDINGS: No acute infarct, hemorrhage or mass. VENTRICULAR SYSTEM:  Unchanged ventricular size, without hydrocephalus. Unchanged ventricular asymmetry with mild to moderate enlargement of the right lateral ventricle. BASAL CISTERNS:  Patent. 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 MIDLINE SHIFT:  None. CALVARIUM/ SKULL BASE: Intact. Left parietal scalp laceration and swelling. PARANASAL SINUSES AND MASTOID AIR CELLS: Clear. VISUALIZED ORBITS: No significant abnormalities. SELLA: No enlargement. IMPRESSION:  No acute intracranial abnormalities. No change since 2/7/2013. Ct Low Ext Lt W Cont    Result Date: 4/25/2019  EXAM: CT LOW EXT LT W CONT INDICATION: Left foot infection COMPARISON: Radiographs 4/23/2018. MRI 3/15/2018 CONTRAST: 100 mL of Isovue-300. TECHNIQUE: Helical CT of the left foot during uneventful rapid bolus intravenous contrast administration. Coronal and Sagittal reformats were generated. Images reviewed in soft tissue and bone windows. CT dose reduction was achieved through the use of a standardized protocol tailored for this examination and automatic exposure control for dose modulation. FINDINGS: Bones: The bones are osteopenic. No fracture, dislocation, or periosteal reaction.  Chronic ossicles adjacent to the tip of the medial malleolus likely related to prior injury. Joint fluid: No significant effusion. Articulations: No significant osteoarthritis. No evidence of inflammatory arthritis. Tendons: There is thickening of the Achilles tendon related to tendinosis. Several chronic calcifications are seen along the medial margin likely related to heterotopic ossification. Muscles: There is partial atrophy of the musculature Soft tissue mass: No mass. Bandaging is seen over the dorsum of the forefoot and over the medial side of the ankle. There is diffuse skin thickening and subcutaneous edema. No evidence of a focal drainable fluid collection. IMPRESSION: Diffuse skin thickening and subcutaneous edema. No evidence of a focal drainable fluid collection. No evidence of osteomyelitis. Us Retroperitoneum Comp    Result Date: 5/21/2019  EXAM:  US RETROPERITONEUM COMP INDICATION:  EARL COMPARISON: None. TECHNIQUE: Real-time sonography of the kidneys, retroperitoneum and bladder was performed with multiple static images obtained. FINDINGS: The kidneys have normal echogenicity with no mass, stone or hydronephrosis. The right kidney measures 10.3 cm and the left kidney measures 11 cm in length. The aorta is obscured by bowel gas  The proximal iliac arteries are obscured by bowel gas  The IVC is normal. No retroperitoneal mass is identified. The urinary bladder is moderately distended. Kidneys are within normal limits. Xr Us Guided Vascular Access    Result Date: 4/29/2019  INDICATION:   NO VENOUS ACCESS  EXAMINATION:  US GUIDE VAS ACCESS FINDINGS: Ultrasound was provided for Midline placement. IMPRESSION: Ultrasound guidance for Midline  placement. GBP    Xr Us Guided Vascular Access    Result Date: 4/25/2019  INDICATION:   NO VENOUS ACCESS  EXAMINATION:  US GUIDE VAS ACCESS FINDINGS: Ultrasound was provided for Midline placement. IMPRESSION: Ultrasound guidance for Midline  placement.  GBP      Side Effects: (reviewed/updated 5/25/2019)  None reported or admitted to. Review of Systems: (reviewed/updated 5/25/2019)  Appetite: good  Sleep: good   All other Review of Systems: negative    Mental Status Exam:  Eye contact: Good eye contact  Psychomotor activity: Relaxed   Speech is spontaneous  Thought process: Logical and goal directed  Mood is \"ok\"  Affect: Blunted  Perception: No avh  Suicidal ideation: No si  Homicidal ideation: No hi  Insight/judgment: Poor  Cognition is grossly intact      Physical Exam:  Musculoskeletal system: steady gait  Tremor not present  Cog wheeling not present      Assessment and Plan:  Ariana Stephens meets criteria for a diagnosis of unspecified mood disorder. Continue current medications as prescribed. We will closely monitor for safety. We will encourage reality orientation. Plan for discharge anytime, awaiting his ride. I certify that this patients inpatient psychiatric hospital services furnished since the previous certification were, and continue to be, required for treatment that could reasonably be expected to improve the patient's condition, or for diagnostic study, and that the patient continues to need, on a daily basis, active treatment furnished directly by or requiring the supervision of inpatient psychiatric facility personnel. In addition, the hospital records show that services furnished were intensive treatment services, admission or related services, or equivalent services.       Signed:  Khloe Partida NP  5/25/2019

## 2019-05-26 NOTE — PROGRESS NOTES
[id number]  Problem: Depressed Mood (Adult/Pediatric)  Goal: *STG: Verbalizes anger, guilt, and other feelings in a constructive manor  Outcome: Progressing Towards Goal  Goal: *STG: Attends activities and groups  Outcome: Progressing Towards Goal  Goal: *STG: Demonstrates reduction in symptoms and increase in insight into coping skills/future focused  Outcome: Progressing Towards Goal   Patient is received seated in WC in dining room quietly. He tells staff he is awaiting his friend to pick him up for discharged. Patient is irritable, attempting to split staff. Attention seeking, monopolizing tv despite confused and agitated peers nearby, demanding snacks from staff often. Patient angry with limits set or redirection given. Staff continue q15 checks, fall precautions, encourage pt to share feelings, give emotional support. Patient denies SI and states he is ready to leave hospital. \"I need to get out of here so I can get a cigarette\"    [phone] pt states he is not sure if his ride is coming of if he is leaving today. 1930 patient states his friend is probably not coming today. He has been waiting for this friend to pick him up for discharge. Patient is watching tv.

## 2019-05-26 NOTE — PROGRESS NOTES
Problem: Falls - Risk of  Goal: *Absence of Falls  Description  Document Nick Stubbs Fall Risk and appropriate interventions in the flowsheet. Outcome: Progressing Towards Goal  Note:   Fall Risk Interventions:  Mobility Interventions: Communicate number of staff needed for ambulation/transfer, Patient to call before getting OOB, Utilize walker, cane, or other assistive device    Mentation Interventions: Adequate sleep, hydration, pain control, Bed/chair exit alarm, More frequent rounding, Door open when patient unattended, Toileting rounds    Medication Interventions: Teach patient to arise slowly    Elimination Interventions: Bed/chair exit alarm, Patient to call for help with toileting needs, Toilet paper/wipes in reach    History of Falls Interventions: Door open when patient unattended, Evaluate medications/consider consulting pharmacy         Problem: Depressed Mood (Adult/Pediatric)  Goal: *STG: Remains safe in hospital  Outcome: Progressing Towards Goal  Note:   Compliant      Problem: Depressed Mood (Adult/Pediatric)  Goal: *STG: Participates in treatment plan  Outcome: Not Progressing Towards Goal  Note:   Pt is cooperative, depressed with a flat affect and anxious  Waiting for discharge. Staff offered emotional support   No S/I  Q 15 min checks     1215: Wound care on patient done per wound care nurse instructions. 1330: Pt called his friend Nichole Connelly for a ride home. She verbalized she was coming in 10 mins. Writer getting discharge paperwork and belongings ready for patient  1500: Patients ride is still not here. Pt is getting anxious and irritable.

## 2019-05-26 NOTE — BH NOTES
PRN Medication Documentation    Specific patient behavior that led to need for PRN medication: foot pain  Staff interventions attempted prior to PRN being given: evaluation  PRN medication given: Tylenol 650 mg po @ 0322  Patient response/effectiveness of PRN medication: will continue to monitor

## 2019-05-26 NOTE — INTERDISCIPLINARY ROUNDS
Behavioral Health Interdisciplinary Rounds     Patient Name: Nasreen Dutton  Age: 46 y.o. Room/Bed:  744/02  Primary Diagnosis: <principal problem not specified>   Admission Status: Voluntary     Readmission within 30 days: no  Power of  in place: no  Patient requires a blocked bed: no          Reason for blocked bed:       VTE Prophylaxis: Not indicated    Mobility needs/Fall risk: no  Flu Vaccine : no   Nutritional Plan: no  Consults:          Labs/Testing due today?: no    Sleep hours:  6.5      Participation in Care/Groups:    Medication Compliant?: Yes  PRNS (last 24 hours): Antianxiety, Sleep Aid and Pain    Restraints (last 24 hours):  no     CIWA (range last 24 hours):     COWS (range last 24 hours):      Alcohol screening (AUDIT) completed -   AUDIT Score: 0     If applicable, date SBIRT discussed in treatment team AND documented:   AUDIT Screen Score: AUDIT Score: 0      Document Brief Intervention (corresponds directly with the 5 A's, Ask, Advise, Assess, Assist, and Arrange): At- Risk Patients (Score 7-15 for women; 8-15 for men)  Discuss concern patient is drinking at unhealthy levels known to increase risk of alcohol-related health problems. Is Patient ready to commit to change? If No:   Encourage reflection   Discuss short term and long term health risks of consuming alcohol   Barriers to change   Reaffirm willingness to help / Educational materials provided  If Yes:   Set goal  [employer] provided    Harmful use or Dependence (Score 16 or greater)   Discuss short term and long term health risks of consuming alcohol   Recommendations   Negotiate drinking goal   Recommend addiction specialist/center   Arrange follow-up appointments.     Tobacco - patient is a smoker: Have You Used Tobacco in the Past 30 Days: Yes  Illegal Drugs use: Have You Used Any Illegal Substances Over the Past 12 Months: No    24 hour chart check complete: no     Patient goal(s) for today:   Treatment team focus/goals:   Progress note     LOS:  6  Expected LOS:     Financial concerns/prescription coverage:    Date of last family contact:       Family requesting physician contact today:    Discharge plan:   Guns in the home:         Outpatient provider(s):     Participating treatment team members: Lidia Acosta, * (assigned SW),

## 2019-05-26 NOTE — BH NOTES
PRN Medication Documentation    Specific patient behavior that led to need for PRN medication: patient asks for medicine to promote rest  Staff interventions attempted prior to PRN being given: decrease stimuli, bedtime snack given, give emotional support  PRN medication given: trazadone  Patient response/effectiveness of PRN medication: to be assessed

## 2019-05-26 NOTE — BH NOTES
GROUP THERAPY PROGRESS NOTE    Zaynab Rick is participating in relaxation. Group time: 30 minutes    Personal goal for participation: quiet time of soft music for relaxation and personal reflection    Goal orientation: relaxation    Group therapy participation: passive    Therapeutic interventions reviewed and discussed: relaxation    Impression of participation: pt is present while eating dinner with good appetite.

## 2019-05-26 NOTE — PROGRESS NOTES
PSYCHIATRIC PROGRESS NOTE    Chief Complaint:  \"I am in pain. \"    Interval History:  Pauline Apgar states he is in pain, but otherwise his mood is ok. Denies si hi or avh. Appropriate during conversation. He is scheduled to be discharged today, awaiting for his gf to pick him up. Tolerating his meds and denies side effects. No management issue.       Past Medical History:  Past Medical History:   Diagnosis Date    Aneurysm ([facility]     (with stroke)    Bladder tumor     Cancer ([facility]     bladder CA    Chronic pain     Family history of bladder cancer     Gout     History of vascular access device 2019    [facility] VAT 4 FR MIDLINE R Cephalic Limited access    History of vascular access device 2019    4 fr Midline right Cephalic midline access    Hypercholesterolemia     Hypertension     Lesion of bladder     Seizures ([facility]     Stroke ([facility] 2006    left sided weakness    Thromboembolus ([facility]     Thyroid disease     TIA (transient ischemic attack)          ALLERGIES:(reviewed/updated 2019)  Allergies   Allergen Reactions    Sulfamethoxazole-Trimethoprim Rash     L eye and L hand    Ciprofloxacin Hives     Per pcp records    Codeine Hives     Tolerates dilaudid, oxycodone    Cymbalta [Duloxetine] Other (comments)     Confusion and memory loss    Gabapentin Hives and Diarrhea    Lyrica [Pregabalin] Hives    Sulfa (Sulfonamide Antibiotics) Rash    Ultram [Tramadol] Hives    Vancomycin Shortness of Breath       Laboratory report:  Lab Results   Component Value Date/Time    WBC 7.0 2019 07:28 AM    HGB 11.7 (L) 2019 07:28 AM    HCT 36.6 2019 07:28 AM    PLATELET 380  07:28 AM    MCV 88.6 2019 07:28 AM      Lab Results   Component Value Date/Time    Sodium 142 2019 07:10 AM    Potassium 3.7 2019 07:10 AM    Chloride 110 (H) 2019 07:10 AM    CO2 24 2019 07:10 AM    Anion gap 8 2019 07:10 AM    Glucose 95 2019 07:10 AM    BUN 9 05/22/2019 07:10 AM    Creatinine 0.63 (L) 05/22/2019 07:10 AM    BUN/Creatinine ratio 14 05/22/2019 07:10 AM    GFR est AA >60 05/22/2019 07:10 AM    GFR est non-AA >60 05/22/2019 07:10 AM    Calcium 8.8 05/22/2019 07:10 AM    Bilirubin, total 0.3 05/20/2019 05:04 AM    AST (SGOT) 38 (H) 05/20/2019 05:04 AM    Alk. phosphatase 124 (H) 05/20/2019 05:04 AM    Protein, total 7.4 05/20/2019 05:04 AM    Albumin 3.7 05/20/2019 05:04 AM    Globulin 3.7 05/20/2019 05:04 AM    A-G Ratio 1.0 (L) 05/20/2019 05:04 AM    ALT (SGPT) 36 05/20/2019 05:04 AM      Vitals:    05/24/19 2008 05/25/19 0709 05/25/19 1543 05/25/19 1856   BP: 165/50 132/71 107/72 116/75   Pulse: (!) 54 69 (!) 58 63   Resp: 16 18 18 20   Temp: 98.4 °F (36.9 °C) 97.6 °F (36.4 °C) 98.3 °F (36.8 °C) 97.8 °F (36.6 °C)   SpO2: 98% 98% 95% 98%   Weight:       Height:           Lab Results   Component Value Date/Time    Valproic acid 99 10/10/2016 04:23 AM     No results found for: [facility]    Vital Signs  Patient Vitals for the past 24 hrs:   Temp Pulse Resp BP SpO2   05/25/19 1856 97.8 °F (36.6 °C) 63 20 116/75 98 %   05/25/19 1543 98.3 °F (36.8 °C) (!) 58 18 107/72 95 %   05/25/19 0709 97.6 °F (36.4 °C) 69 18 132/71 98 %     Wt Readings from Last 3 Encounters:   05/20/19 108.9 kg (240 lb)   05/20/19 108.9 kg (240 lb)   04/25/19 108.9 kg (240 lb)     Temp Readings from Last 3 Encounters:   05/25/19 97.8 °F (36.6 °C)   05/20/19 98 °F (36.7 °C)   05/10/19 97.9 °F (36.6 °C)     BP Readings from Last 3 Encounters:   05/25/19 116/75   05/20/19 98/63   05/10/19 151/78     Pulse Readings from Last 3 Encounters:   05/25/19 63   05/20/19 76   05/10/19 60       Radiology (reviewed/updated 5/25/2019)  Xr Foot Lt Ap/lat    Result Date: 4/23/2019  EXAM: XR FOOT LT AP/LAT INDICATION: osteo? . COMPARISON: 2018 FINDINGS: Two views of the left foot demonstrate osteopenia. No bony destructive lesion is identified. . There is slight sclerosis left fifth distal metatarsal similar to the prior study. .     IMPRESSION: Osteopenia. No bony destructive lesion is identified. Follow-up study may be helpful to assess for interval change. .    Ct Head Wo Cont    Result Date: 5/20/2019  EXAMINATION:  CT HEAD WO CONT CLINICAL INFORMATION:  fall COMPARISON:  2/7/2013 TECHNIQUE: Routine axial head CT was performed. IV contrast was not administered. Sagittal and coronal reconstructions were generated. CT dose reduction was achieved through use of a standardized protocol tailored for this examination and automatic exposure control for dose modulation. FINDINGS: No acute infarct, hemorrhage or mass. VENTRICULAR SYSTEM:  Unchanged ventricular size, without hydrocephalus. Unchanged ventricular asymmetry with mild to moderate enlargement of the right lateral ventricle. BASAL CISTERNS:  Patent. 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 MIDLINE SHIFT:  None. CALVARIUM/ SKULL BASE: Intact. Left parietal scalp laceration and swelling. PARANASAL SINUSES AND MASTOID AIR CELLS: Clear. VISUALIZED ORBITS: No significant abnormalities. SELLA: No enlargement. IMPRESSION:  No acute intracranial abnormalities. No change since 2/7/2013. Ct Low Ext Lt W Cont    Result Date: 4/25/2019  EXAM: CT LOW EXT LT W CONT INDICATION: Left foot infection COMPARISON: Radiographs 4/23/2018. MRI 3/15/2018 CONTRAST: 100 mL of Isovue-300. TECHNIQUE: Helical CT of the left foot during uneventful rapid bolus intravenous contrast administration. Coronal and Sagittal reformats were generated. Images reviewed in soft tissue and bone windows. CT dose reduction was achieved through the use of a standardized protocol tailored for this examination and automatic exposure control for dose modulation. FINDINGS: Bones: The bones are osteopenic. No fracture, dislocation, or periosteal reaction.  Chronic ossicles adjacent to the tip of the medial malleolus likely related to prior injury. Joint fluid: No significant effusion. Articulations: No significant osteoarthritis. No evidence of inflammatory arthritis. Tendons: There is thickening of the Achilles tendon related to tendinosis. Several chronic calcifications are seen along the medial margin likely related to heterotopic ossification. Muscles: There is partial atrophy of the musculature Soft tissue mass: No mass. Bandaging is seen over the dorsum of the forefoot and over the medial side of the ankle. There is diffuse skin thickening and subcutaneous edema. No evidence of a focal drainable fluid collection. IMPRESSION: Diffuse skin thickening and subcutaneous edema. No evidence of a focal drainable fluid collection. No evidence of osteomyelitis. Us Retroperitoneum Comp    Result Date: 5/21/2019  EXAM:  US RETROPERITONEUM COMP INDICATION:  EARL COMPARISON: None. TECHNIQUE: Real-time sonography of the kidneys, retroperitoneum and bladder was performed with multiple static images obtained. FINDINGS: The kidneys have normal echogenicity with no mass, stone or hydronephrosis. The right kidney measures 10.3 cm and the left kidney measures 11 cm in length. The aorta is obscured by bowel gas  The proximal iliac arteries are obscured by bowel gas  The IVC is normal. No retroperitoneal mass is identified. The urinary bladder is moderately distended. Kidneys are within normal limits. Xr Us Guided Vascular Access    Result Date: 4/29/2019  INDICATION:   NO VENOUS ACCESS  EXAMINATION:  US GUIDE VAS ACCESS FINDINGS: Ultrasound was provided for Midline placement. IMPRESSION: Ultrasound guidance for Midline  placement. GBP    Xr Us Guided Vascular Access    Result Date: 4/25/2019  INDICATION:   NO VENOUS ACCESS  EXAMINATION:  US GUIDE VAS ACCESS FINDINGS: Ultrasound was provided for Midline placement. IMPRESSION: Ultrasound guidance for Midline  placement.  GBP      Side Effects: (reviewed/updated 5/25/2019)  None reported or admitted to. Review of Systems: (reviewed/updated 5/25/2019)  Appetite: good  Sleep: good   All other Review of Systems: negative    Mental Status Exam:  Eye contact: Good eye contact  Psychomotor activity: Relaxed   Speech is spontaneous  Thought process: Logical and goal directed  Mood is \"ok\"  Affect: Blunted  Perception: No avh  Suicidal ideation: No si  Homicidal ideation: No hi  Insight/judgment: Poor  Cognition is grossly intact      Physical Exam:  Musculoskeletal system: steady gait  Tremor not present  Cog wheeling not present      Assessment and Plan:  Skinny Styles meets criteria for a diagnosis of unspecified mood disorder. Continue current medications as prescribed. We will closely monitor for safety. We will encourage reality orientation. Plan for discharge today. I certify that this patients inpatient psychiatric hospital services furnished since the previous certification were, and continue to be, required for treatment that could reasonably be expected to improve the patient's condition, or for diagnostic study, and that the patient continues to need, on a daily basis, active treatment furnished directly by or requiring the supervision of inpatient psychiatric facility personnel. In addition, the hospital records show that services furnished were intensive treatment services, admission or related services, or equivalent services.       Signed:  Vladimir Nicole NP  5/25/2019

## 2019-05-26 NOTE — PROGRESS NOTES
2330: Patient appears to be sleeping. Respirations even and unlabored with no distress noted. Will continue to monitor with q15 minute checks. Problem: Falls - Risk of  Goal: *Absence of Falls  Description  Document Felicita Mc Fall Risk and appropriate interventions in the flowsheet.   Outcome: Progressing Towards Goal

## 2019-05-27 VITALS
HEART RATE: 62 BPM | WEIGHT: 241 LBS | RESPIRATION RATE: 17 BRPM | SYSTOLIC BLOOD PRESSURE: 123 MMHG | HEIGHT: 73 IN | TEMPERATURE: 97.5 F | BODY MASS INDEX: 31.94 KG/M2 | OXYGEN SATURATION: 95 % | DIASTOLIC BLOOD PRESSURE: 89 MMHG

## 2019-05-27 PROCEDURE — 74011250637 HC RX REV CODE- 250/637: Performed by: INTERNAL MEDICINE

## 2019-05-27 PROCEDURE — 74011250637 HC RX REV CODE- 250/637: Performed by: NURSE PRACTITIONER

## 2019-05-27 PROCEDURE — 74011250637 HC RX REV CODE- 250/637: Performed by: PSYCHIATRY & NEUROLOGY

## 2019-05-27 RX ADMIN — AMLODIPINE BESYLATE 10 MG: 5 TABLET ORAL at 09:55

## 2019-05-27 RX ADMIN — SENNOSIDES,DOCUSATE SODIUM 1 TABLET: 8.6; 5 TABLET, FILM COATED ORAL at 09:55

## 2019-05-27 RX ADMIN — HYDROXYZINE HYDROCHLORIDE 50 MG: 50 TABLET, FILM COATED ORAL at 04:17

## 2019-05-27 RX ADMIN — METHADONE HYDROCHLORIDE 130 MG: 5 SOLUTION ORAL at 06:54

## 2019-05-27 RX ADMIN — SERTRALINE HYDROCHLORIDE 50 MG: 50 TABLET ORAL at 09:55

## 2019-05-27 RX ADMIN — ACETAMINOPHEN 650 MG: 325 TABLET ORAL at 10:20

## 2019-05-27 RX ADMIN — ACETAMINOPHEN 650 MG: 325 TABLET ORAL at 04:17

## 2019-05-27 RX ADMIN — ALLOPURINOL 100 MG: 100 TABLET ORAL at 09:55

## 2019-05-27 RX ADMIN — TAMSULOSIN HYDROCHLORIDE 0.4 MG: 0.4 CAPSULE ORAL at 09:55

## 2019-05-27 RX ADMIN — LEVOTHYROXINE SODIUM 100 MCG: 100 TABLET ORAL at 06:53

## 2019-05-27 NOTE — DISCHARGE SUMMARY
PSYCHIATRIC DISCHARGE SUMMARY    Date of Admission: 5/20/2019  Date of Discharge:5/27/2019      Type of Discharge:  REGULAR     Admission data:  CHIEF COMPLAINT:  \"I'm in pain. \"     HISTORY OF PRESENT ILLNESS:  The patient is a 50-year-old male who is currently admitted in the inpatient psychiatric unit at [facility] on a temporary prison order basis. He was transferred from [facility] Emergency Room. He presented to the emergency room with hip pain, suicidal ideation, and alcoholism. He is quite irritable during the interview. He is known to this facility as a result of previous inpatient psychiatric admission, but is new to this provider. He said that he was helping his friend by fixing the air conditioner and they refused to pay him. He states that he fell and has been in pain since then. His blood alcohol level on admission was 24 and his urine drug screen was positive for opiates. He was endorsing suicidal ideation because he states that people are taking advantage of him and using him, but do not care about him. He has no reason to live and had a plan to jump off a bridge or run into traffic. He also reported that he had a plan to shoot himself with a gun if he could get one. He has a history of medication noncompliance and poor outpatient followups. He has a history of bladder cancer and infection on his foot. He has been in the emergency room 13 times for various complaints relating to medical issues and alcohol. He denies suicidal ideation, homicidal ideation, or auditory or visual hallucinations during the interview.     PAST MEDICAL HISTORY:  See H and P.     PAST PSYCHIATRIC HOSPITALIZATION:  He was previously admitted here at [facility] twice. He has history of medication noncompliance and poor outpatient followup. He was previously on Zoloft and used to go to [facility].     PSYCHOSOCIAL HISTORY:  He is currently homeless. He is single.   He currently receives social security disability checks.     MENTAL STATUS EXAM:  He is alert and oriented in all spheres. Mood is irritable. Affect is mildly constricted. Speech normal rate and rhythm. Thought process logical and goal directed. He currently denies suicidal ideation, homicidal ideation, auditory or visual hallucinations. Memory seems intact. Intelligence seems below average. Insight is poor. Judgment is poor.       Hospital Course:    Patient was admitted to the Psychiatric services for acute psychiatric stabilization in regards to symptomatology as described in the HPI above and placed on Q15 minute checks and suicide and withdrawal precautions. He was started back on his usual medication regimen as well as PRN medications including  Methadone and zoloft. While on the unit Jordi Hdz was irritable. Patients symptoms improved gradually including no si or hi, denies depression or anxiety. He was cooperative with medications and the unit routine. Please see individual progress notes for more specific details regarding patient's hospitalization course. Patient was discharged as per the plan. He had been doing well on the unit as per the report of the nursing staff and my observations. No PRN medication for agitation, seclusion or restraints were required during the last 48 hours of her stay. Jordi Hdz had improved progressively to the point of being stable for discharge and outpatient FU. At this time he did not offer any complaints. Patient denied any SI or HI. Denied any AH or VH. He denied any delusions. Was not considered a danger to self or to others and is safe for discharge. Will FU with his appointments and remains motivated to be in treatment. The patient verbalized understanding of his discharge instructions.      Some parts of the discharge summary are from the initial Psychiatric interview that was done on admission by the admitting psychiatrist.       Allergies:(reviewed/updated 5/27/2019)  Allergies Allergen Reactions    Sulfamethoxazole-Trimethoprim Rash     L eye and L hand    Ciprofloxacin Hives     Per pcp records    Codeine Hives     Tolerates dilaudid, oxycodone    Cymbalta [Duloxetine] Other (comments)     Confusion and memory loss    Gabapentin Hives and Diarrhea    Lyrica [Pregabalin] Hives    Sulfa (Sulfonamide Antibiotics) Rash    Ultram [Tramadol] Hives    Vancomycin Shortness of Breath       Side Effects: (reviewed/updated 5/27/2019)  None reported or admitted to. Vital Signs:  Patient Vitals for the past 24 hrs:   Temp Pulse Resp BP SpO2   05/27/19 0955 -- 62 -- 123/89 --   05/27/19 0821 97.5 °F (36.4 °C) (!) 58 17 101/70 95 %   05/26/19 1910 97.6 °F (36.4 °C) 88 20 121/83 95 %     Wt Readings from Last 3 Encounters:   05/26/19 109.3 kg (241 lb)   05/20/19 108.9 kg (240 lb)   04/25/19 108.9 kg (240 lb)     Temp Readings from Last 3 Encounters:   05/27/19 97.5 °F (36.4 °C)   05/20/19 98 °F (36.7 °C)   05/10/19 97.9 °F (36.6 °C)     BP Readings from Last 3 Encounters:   05/27/19 123/89   05/20/19 98/63   05/10/19 151/78     Pulse Readings from Last 3 Encounters:   05/27/19 62   05/20/19 76   05/10/19 60       Labs: (reviewed/updated 5/27/2019)  No results found for this or any previous visit (from the past 24 hour(s)). Lab Results   Component Value Date/Time    Valproic acid 99 10/10/2016 04:23 AM     No results found for: [place] [facility] [place]    Radiology (reviewed/updated 5/27/2019)  Xr Foot Lt Ap/lat    Result Date: 4/23/2019  EXAM: XR FOOT LT AP/LAT INDICATION: osteo? . COMPARISON: 2018 FINDINGS: Two views of the left foot demonstrate osteopenia. No bony destructive lesion is identified. . There is slight sclerosis left fifth distal metatarsal similar to the prior study. .     IMPRESSION: Osteopenia. No bony destructive lesion is identified. Follow-up study may be helpful to assess for interval change. .    Ct Head Wo Cont    Result Date: 5/20/2019  EXAMINATION:  CT HEAD WO CONT CLINICAL INFORMATION:  fall COMPARISON:  2/7/2013 TECHNIQUE: Routine axial head CT was performed. IV contrast was not administered. Sagittal and coronal reconstructions were generated. CT dose reduction was achieved through use of a standardized protocol tailored for this examination and automatic exposure control for dose modulation. FINDINGS: No acute infarct, hemorrhage or mass. VENTRICULAR SYSTEM:  Unchanged ventricular size, without hydrocephalus. Unchanged ventricular asymmetry with mild to moderate enlargement of the right lateral ventricle. BASAL CISTERNS:  Patent. 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 MIDLINE SHIFT:  None. CALVARIUM/ SKULL BASE: Intact. Left parietal scalp laceration and swelling. PARANASAL SINUSES AND MASTOID AIR CELLS: Clear. VISUALIZED ORBITS: No significant abnormalities. SELLA: No enlargement. IMPRESSION:  No acute intracranial abnormalities. No change since 2/7/2013. Ct Low Ext Lt W Cont    Result Date: 4/25/2019  EXAM: CT LOW EXT LT W CONT INDICATION: Left foot infection COMPARISON: Radiographs 4/23/2018. MRI 3/15/2018 CONTRAST: 100 mL of Isovue-300. TECHNIQUE: Helical CT of the left foot during uneventful rapid bolus intravenous contrast administration. Coronal and Sagittal reformats were generated. Images reviewed in soft tissue and bone windows. CT dose reduction was achieved through the use of a standardized protocol tailored for this examination and automatic exposure control for dose modulation. FINDINGS: Bones: The bones are osteopenic. No fracture, dislocation, or periosteal reaction. Chronic ossicles adjacent to the tip of the medial malleolus likely related to prior injury. Joint fluid: No significant effusion. Articulations: No significant osteoarthritis. No evidence of inflammatory arthritis. Tendons: There is thickening of the Achilles tendon related to tendinosis.  Several chronic calcifications are seen along the medial margin likely related to heterotopic ossification. Muscles: There is partial atrophy of the musculature Soft tissue mass: No mass. Bandaging is seen over the dorsum of the forefoot and over the medial side of the ankle. There is diffuse skin thickening and subcutaneous edema. No evidence of a focal drainable fluid collection. IMPRESSION: Diffuse skin thickening and subcutaneous edema. No evidence of a focal drainable fluid collection. No evidence of osteomyelitis. Us Retroperitoneum Comp    Result Date: 5/21/2019  EXAM:  US RETROPERITONEUM COMP INDICATION:  EARL COMPARISON: None. TECHNIQUE: Real-time sonography of the kidneys, retroperitoneum and bladder was performed with multiple static images obtained. FINDINGS: The kidneys have normal echogenicity with no mass, stone or hydronephrosis. The right kidney measures 10.3 cm and the left kidney measures 11 cm in length. The aorta is obscured by bowel gas  The proximal iliac arteries are obscured by bowel gas  The IVC is normal. No retroperitoneal mass is identified. The urinary bladder is moderately distended. Kidneys are within normal limits. Xr Us Guided Vascular Access    Result Date: 4/29/2019  INDICATION:   NO VENOUS ACCESS  EXAMINATION:  US GUIDE VAS ACCESS FINDINGS: Ultrasound was provided for Midline placement. IMPRESSION: Ultrasound guidance for Midline  placement. GBP    Xr Us Guided Vascular Access    Result Date: 4/25/2019  INDICATION:   NO VENOUS ACCESS  EXAMINATION:  US GUIDE VAS ACCESS FINDINGS: Ultrasound was provided for Midline placement. IMPRESSION: Ultrasound guidance for Midline  placement. GBP        Mental Status Exam on Discharge:  General appearance:   Sixto Yadav is a 46 y.o.  WHITE OR  male who is well groomed, psychomotor activity is WNL  Eye contact: makes good eye contact  Speech: Spontaneous and coherent  Affect : Euthymic  Mood: \"OK\"  Thought Process: Logical, goal directed  Perception: Denies any AH or VH. Thought Content: Denies any SI or Plan  Insight: Partial  Judgement: Fair  Cognition: Intact grossly. Discharge Diagnoses:  1. Unspecified mood disorder. 2.  Alcohol use disorder, unspecified. Discharge Medication List as of 5/27/2019  1:13 PM      CONTINUE these medications which have CHANGED    Details   allopurinol (ZYLOPRIM) 100 mg tablet Take 1 Tab by mouth daily. Indications: treatment to prevent acute gout attack, Print, Disp-30 Tab, R-0      levothyroxine (SYNTHROID) 100 mcg tablet Take 1 Tab by mouth Daily (before breakfast). Indications: hypothyroidism, Print, Disp-30 Tab, R-0      tamsulosin (FLOMAX) 0.4 mg capsule Take 1 Cap by mouth daily. Indications: bladder cancer, Print, Disp-30 Cap, R-0      sertraline (ZOLOFT) 50 mg tablet Take 1 Tab by mouth daily. Indications: major depressive disorder, Print, Disp-30 Tab, R-0         CONTINUE these medications which have NOT CHANGED    Details   senna-docusate (DARELL-COLACE) 8.6-50 mg per tablet Take 1 Tab by mouth daily. , Historical Med      lisinopril (PRINIVIL, ZESTRIL) 20 mg tablet Take 2 Tabs by mouth daily. Indications: high blood pressure, Print, Disp-30 Tab, R-0      amLODIPine (NORVASC) 10 mg tablet Take 1 Tab by mouth daily. , Print, Disp-30 Tab, R-0      methadone (DOLOPHINE) 10 mg/mL solution Take 130 mg by mouth daily. , Historical Med      colchicine 0.6 mg tablet Take 0.6 mg by mouth daily as needed (gout flare). , Historical Med      acetaminophen (TYLENOL) 325 mg tablet Take 2 Tabs by mouth every four (4) hours as needed.  Indications: Arthritic Pain, No Print, Disp-30 Tab, R-0      melatonin 3 mg tablet Take 1 Tab by mouth nightly as needed., No Print, Disp-10 Tab, R-0         STOP taking these medications       gabapentin (NEURONTIN) 400 mg capsule Comments:   Reason for Stopping:         ibuprofen (MOTRIN) 600 mg tablet Comments:   Reason for Stopping: Follow-up Information     Follow up With Specialties Details Why Contact Info    Brianna Kaur DPM Podiatry Schedule an appointment as soon as possible for a visit on 5/30/2019 you have a 3:00 appointment  [phone]  [address] [facility]  Go to  Meño Marcelino MD Urology Schedule an appointment as soon as possible for a visit  [address] [address] Myrick Heavenly Blevins MD Internal Medicine On 5/28/2019 you have a 8:40 appointment  [facility]  [phone]      Jon Elizabeth MD Internal Medicine   [address] [address]  [phone]          WOUND CARE: none needed. Prognosis:   Good / [place] based on nature of patient's pathology/ies and treatment compliance issues. Prognosis is greatly dependent upon patient's ability to  follow up on psychiatric/psychotherapy appointments as well as to comply with psychiatric medications as prescribed. I certify that this patients inpatient psychiatric hospital services furnished since the previous certification were, and continue to be, required for treatment that could reasonably be expected to improve the patient's condition, or for diagnostic study, and that the patient continues to need, on a daily basis, active treatment furnished directly by or requiring the supervision of inpatient psychiatric facility personnel. In addition, the hospital records show that services furnished were intensive treatment services, admission or related services, or equivalent services.      Signed:  Eileen Wing NP  5/27/2019

## 2019-05-27 NOTE — PROGRESS NOTES
Problem: Depressed Mood (Adult/Pediatric)  Goal: *STG: Participates in treatment plan  Outcome: Progressing Towards Goal  Note:   Pt med and meal compliant. Pt calm and cooperative. Pt out on unit engaged with peers. Pt goal and staff goal is for patient to discharge today. Goal: *STG: Complies with medication therapy  Outcome: Progressing Towards Goal  Note:   Pt med compliant. Problem: Falls - Risk of  Goal: *Absence of Falls  Description  Document Luisa Amado Fall Risk and appropriate interventions in the flowsheet. 5/27/2019 0911 by Eileen URIAS  Outcome: Progressing Towards Goal  Note:   5/27/2019 0908 by Eileen URIAS  Note:   Pt free from falls this shift. Pt wearing non-skid safety socks at this time.       100 [facility] 60  Master Treatment Plan for Tequila Carias    Date Treatment Plan Initiated: 5/27/19    Treatment Plan Modalities: 6/3/19  Type of Modality Amount  (x minutes) Frequency (x/week) Duration (x days) Name of Responsible Staff   [address] [facility] meetings to encourage peer interactions 15 7 1 EMERY Park     Group psychotherapy to assist in building coping skills and internal controls 60 7 1 Selvin Vaz   Therapeutic activity groups to build coping skills 60 7 1 Selvin Vaz   Psychoeducation in group setting to address:   Medication education   15 7 [phone] skills         Relaxation techniques         Symptom management         Discharge planning   60 [address] [facility]   60 1 1 volunteer   Recovery/AA/NA   61 1 1 volunteer   Physician medication management   15 7 1    Family meeting/discharge planning   15 2 1400 [facility] [employer]

## 2019-05-27 NOTE — PROGRESS NOTES
2330: Patient in bed and appears to be sleeping, respirations even and unlabored with no apparent distress. Will continue to monitor with q15 checks throughout the shift. Problem: Falls - Risk of  Goal: *Absence of Falls  Description  Document Benjamin Novoa Fall Risk and appropriate interventions in the flowsheet.   Outcome: Progressing Towards Goal

## 2019-05-27 NOTE — BH NOTES
PRN Medication Documentation    Specific patient behavior that led to need for PRN medication: foot pain  Staff interventions attempted prior to PRN being given: elevate  PRN medication given: tylenol  Patient response/effectiveness of PRN medication: well, will continue to monitor

## 2019-05-27 NOTE — PROGRESS NOTES
PSYCHIATRIC PROGRESS NOTE    Chief Complaint:  My ride did not come. Interval History:  Niraj Zavala reports he feels good and ready to leave the hospital. He called his girlfrient a few times today and he was told that she was on her way. Unfortunately, his ride did not show up. He reports that he is ready to be discharged anytime, he denies si hi or avh. Nursing report that he is demanding, irritable, splits staff, and showing manipulative behaviors.      Past Medical History:  Past Medical History:   Diagnosis Date    Aneurysm ([facility]     (with stroke)    Bladder tumor     Cancer ([facility]     bladder CA    Chronic pain     Family history of bladder cancer     Gout     History of vascular access device 04/25/2019    [facility] VAT 4 FR MIDLINE R Cephalic Limited access    History of vascular access device 04/26/2019    4 fr Midline right Cephalic midline access    Hypercholesterolemia     Hypertension     Lesion of bladder     Seizures ([facility]     Stroke ([facility] 2006    left sided weakness    Thromboembolus ([facility]     Thyroid disease     TIA (transient ischemic attack) 2012         ALLERGIES:(reviewed/updated 5/26/2019)  Allergies   Allergen Reactions    Sulfamethoxazole-Trimethoprim Rash     L eye and L hand    Ciprofloxacin Hives     Per pcp records    Codeine Hives     Tolerates dilaudid, oxycodone    Cymbalta [Duloxetine] Other (comments)     Confusion and memory loss    Gabapentin Hives and Diarrhea    Lyrica [Pregabalin] Hives    Sulfa (Sulfonamide Antibiotics) Rash    Ultram [Tramadol] Hives    Vancomycin Shortness of Breath       Laboratory report:  Lab Results   Component Value Date/Time    WBC 7.0 05/23/2019 07:28 AM    HGB 11.7 (L) 05/23/2019 07:28 AM    HCT 36.6 05/23/2019 07:28 AM    PLATELET 671 20/70/0053 07:28 AM    MCV 88.6 05/23/2019 07:28 AM      Lab Results   Component Value Date/Time    Sodium 142 05/22/2019 07:10 AM    Potassium 3.7 05/22/2019 07:10 AM    Chloride 110 (H) 05/22/2019 07:10 AM    CO2 24 05/22/2019 07:10 AM    Anion gap 8 05/22/2019 07:10 AM    Glucose 95 05/22/2019 07:10 AM    BUN 9 05/22/2019 07:10 AM    Creatinine 0.63 (L) 05/22/2019 07:10 AM    BUN/Creatinine ratio 14 05/22/2019 07:10 AM    GFR est AA >60 05/22/2019 07:10 AM    GFR est non-AA >60 05/22/2019 07:10 AM    Calcium 8.8 05/22/2019 07:10 AM    Bilirubin, total 0.3 05/20/2019 05:04 AM    AST (SGOT) 38 (H) 05/20/2019 05:04 AM    Alk. phosphatase 124 (H) 05/20/2019 05:04 AM    Protein, total 7.4 05/20/2019 05:04 AM    Albumin 3.7 05/20/2019 05:04 AM    Globulin 3.7 05/20/2019 05:04 AM    A-G Ratio 1.0 (L) 05/20/2019 05:04 AM    ALT (SGPT) 36 05/20/2019 05:04 AM      Vitals:    05/26/19 0834 05/26/19 1039 05/26/19 1532 05/26/19 1910   BP: 116/86  104/66 121/83   Pulse: (!) 55  63 88   Resp: 16  16 20   Temp: 98.3 °F (36.8 °C)  98.7 °F (37.1 °C) 97.6 °F (36.4 °C)   SpO2: 95%  96% 95%   Weight:  109.3 kg (241 lb)     Height:           Lab Results   Component Value Date/Time    Valproic acid 99 10/10/2016 04:23 AM     No results found for: LITHM    Vital Signs  Patient Vitals for the past 24 hrs:   Temp Pulse Resp BP SpO2   05/26/19 1910 97.6 °F (36.4 °C) 88 20 121/83 95 %   05/26/19 1532 98.7 °F (37.1 °C) 63 16 104/66 96 %   05/26/19 0834 98.3 °F (36.8 °C) (!) 55 16 116/86 95 %     Wt Readings from Last 3 Encounters:   05/26/19 109.3 kg (241 lb)   05/20/19 108.9 kg (240 lb)   04/25/19 108.9 kg (240 lb)     Temp Readings from Last 3 Encounters:   05/26/19 97.6 °F (36.4 °C)   05/20/19 98 °F (36.7 °C)   05/10/19 97.9 °F (36.6 °C)     BP Readings from Last 3 Encounters:   05/26/19 121/83   05/20/19 98/63   05/10/19 151/78     Pulse Readings from Last 3 Encounters:   05/26/19 88   05/20/19 76   05/10/19 60       Radiology (reviewed/updated 5/26/2019)  Xr Foot Lt Ap/lat    Result Date: 4/23/2019  EXAM: XR FOOT LT AP/LAT INDICATION: osteo? . COMPARISON: 2018 FINDINGS: Two views of the left foot demonstrate osteopenia.  No bony destructive lesion is identified. . There is slight sclerosis left fifth distal metatarsal similar to the prior study. .     IMPRESSION: Osteopenia. No bony destructive lesion is identified. Follow-up study may be helpful to assess for interval change. .    Ct Head Wo Cont    Result Date: 5/20/2019  EXAMINATION:  CT HEAD WO CONT CLINICAL INFORMATION:  fall COMPARISON:  2/7/2013 TECHNIQUE: Routine axial head CT was performed. IV contrast was not administered. Sagittal and coronal reconstructions were generated. CT dose reduction was achieved through use of a standardized protocol tailored for this examination and automatic exposure control for dose modulation. FINDINGS: No acute infarct, hemorrhage or mass. VENTRICULAR SYSTEM:  Unchanged ventricular size, without hydrocephalus. Unchanged ventricular asymmetry with mild to moderate enlargement of the right lateral ventricle. BASAL CISTERNS:  Patent. 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 MIDLINE SHIFT:  None. CALVARIUM/ SKULL BASE: Intact. Left parietal scalp laceration and swelling. PARANASAL SINUSES AND MASTOID AIR CELLS: Clear. VISUALIZED ORBITS: No significant abnormalities. SELLA: No enlargement. IMPRESSION:  No acute intracranial abnormalities. No change since 2/7/2013. Ct Low Ext Lt W Cont    Result Date: 4/25/2019  EXAM: CT LOW EXT LT W CONT INDICATION: Left foot infection COMPARISON: Radiographs 4/23/2018. MRI 3/15/2018 CONTRAST: 100 mL of Isovue-300. TECHNIQUE: Helical CT of the left foot during uneventful rapid bolus intravenous contrast administration. Coronal and Sagittal reformats were generated. Images reviewed in soft tissue and bone windows. CT dose reduction was achieved through the use of a standardized protocol tailored for this examination and automatic exposure control for dose modulation. FINDINGS: Bones: The bones are osteopenic.  No fracture, dislocation, or periosteal reaction. Chronic ossicles adjacent to the tip of the medial malleolus likely related to prior injury. Joint fluid: No significant effusion. Articulations: No significant osteoarthritis. No evidence of inflammatory arthritis. Tendons: There is thickening of the Achilles tendon related to tendinosis. Several chronic calcifications are seen along the medial margin likely related to heterotopic ossification. Muscles: There is partial atrophy of the musculature Soft tissue mass: No mass. Bandaging is seen over the dorsum of the forefoot and over the medial side of the ankle. There is diffuse skin thickening and subcutaneous edema. No evidence of a focal drainable fluid collection. IMPRESSION: Diffuse skin thickening and subcutaneous edema. No evidence of a focal drainable fluid collection. No evidence of osteomyelitis. Us Retroperitoneum Comp    Result Date: 5/21/2019  EXAM:  US RETROPERITONEUM COMP INDICATION:  EARL COMPARISON: None. TECHNIQUE: Real-time sonography of the kidneys, retroperitoneum and bladder was performed with multiple static images obtained. FINDINGS: The kidneys have normal echogenicity with no mass, stone or hydronephrosis. The right kidney measures 10.3 cm and the left kidney measures 11 cm in length. The aorta is obscured by bowel gas  The proximal iliac arteries are obscured by bowel gas  The IVC is normal. No retroperitoneal mass is identified. The urinary bladder is moderately distended. Kidneys are within normal limits. Xr Us Guided Vascular Access    Result Date: 4/29/2019  INDICATION:   NO VENOUS ACCESS  EXAMINATION:  US GUIDE VAS ACCESS FINDINGS: Ultrasound was provided for Midline placement. IMPRESSION: Ultrasound guidance for Midline  placement. GBP    Xr Us Guided Vascular Access    Result Date: 4/25/2019  INDICATION:   NO VENOUS ACCESS  EXAMINATION:  US GUIDE VAS ACCESS FINDINGS: Ultrasound was provided for Midline placement.      IMPRESSION: Ultrasound guidance for Midline  placement. GBP      Side Effects: (reviewed/updated 5/26/2019)  None reported or admitted to. Review of Systems: (reviewed/updated 5/26/2019)  Appetite: good  Sleep: good   All other Review of Systems: negative    Mental Status Exam:  Eye contact: Good eye contact  Psychomotor activity: Relaxed   Speech is spontaneous  Thought process: Logical and goal directed  Mood is \"ok\"  Affect: Blunted  Perception: No avh  Suicidal ideation: No si  Homicidal ideation: No hi  Insight/judgment: Poor  Cognition is grossly intact      Physical Exam:  Musculoskeletal system: steady gait  Tremor not present  Cog wheeling not present      Assessment and Plan:  Elvia Knox meets criteria for a diagnosis of unspecified mood disorder. Continue current medications as prescribed. We will closely monitor for safety. We will encourage reality orientation. Plan for discharge anytime, just waiting for his ride. I certify that this patients inpatient psychiatric hospital services furnished since the previous certification were, and continue to be, required for treatment that could reasonably be expected to improve the patient's condition, or for diagnostic study, and that the patient continues to need, on a daily basis, active treatment furnished directly by or requiring the supervision of inpatient psychiatric facility personnel. In addition, the hospital records show that services furnished were intensive treatment services, admission or related services, or equivalent services.       Signed:  Trish Cordero NP  5/26/2019

## 2019-05-27 NOTE — BH NOTES
Behavioral Health Transition Record to Provider    Patient Name: Elvia Knox  YOB: 1965  Medical Record Number: [id number]  Date of Admission: 5/20/2019  Date of Discharge: 5/27/2019    Attending Provider: Keira Rivers NP  Discharging Provider: Keira Rivers NP  To contact this individual call [phone] and ask the  to page. If unavailable, ask to be transferred to [address] [place] [address] Provider on call. [facility] Provider will be available on call 24/7 and during holidays. Primary Care Provider: Haile Mares MD    Allergies   Allergen Reactions    Sulfamethoxazole-Trimethoprim Rash     L eye and L hand    Ciprofloxacin Hives     Per pcp records    Codeine Hives     Tolerates dilaudid, oxycodone    Cymbalta [Duloxetine] Other (comments)     Confusion and memory loss    Gabapentin Hives and Diarrhea    Lyrica [Pregabalin] Hives    Sulfa (Sulfonamide Antibiotics) Rash    Ultram [Tramadol] Hives    Vancomycin Shortness of Breath       Reason for Admission: The patient is a 27-year-old male who is currently admitted in the inpatient psychiatric unit at [facility] on a temporary [facility] order basis. He was transferred from [address] [place] [address] Emergency Room. He presented to the emergency room with hip pain, suicidal ideation, and alcoholism.     Admission Diagnosis: Depression [F32.9]  Depression [F32.9]    * No surgery found *    Results for orders placed or performed during the hospital encounter of 86/82/03   METABOLIC PANEL, BASIC   Result Value Ref Range    Sodium 142 136 - 145 mmol/L    Potassium 3.7 3.5 - 5.1 mmol/L    Chloride 110 (H) 97 - 108 mmol/L    CO2 24 21 - 32 mmol/L    Anion gap 8 5 - 15 mmol/L    Glucose 95 65 - 100 mg/dL    BUN 9 6 - 20 MG/DL    Creatinine 0.63 (L) 0.70 - 1.30 MG/DL    BUN/Creatinine ratio 14 12 - 20      GFR est AA >60 >60 ml/min/1.73m2    GFR est non-AA >60 >60 ml/min/1.73m2    Calcium 8.8 8.5 - 10.1 MG/DL   CBC WITH AUTOMATED DIFF   Result Value Ref Range    WBC 7.0 4.1 - 11.1 K/uL    RBC 4.13 4.10 - 5.70 M/uL    HGB 11.7 (L) 12.1 - 17.0 g/dL    HCT 36.6 36.6 - 50.3 %    MCV 88.6 80.0 - 99.0 FL    MCH 28.3 26.0 - 34.0 PG    MCHC 32.0 30.0 - 36.5 g/dL    RDW 14.3 11.5 - 14.5 %    PLATELET 204 150 - 458 K/uL    MPV 11.4 8.9 - 12.9 FL    NRBC 0.0 0  WBC    ABSOLUTE NRBC 0.00 0.00 - 0.01 K/uL    NEUTROPHILS 43 32 - 75 %    LYMPHOCYTES 41 12 - 49 %    MONOCYTES 9 5 - 13 %    EOSINOPHILS 5 0 - 7 %    BASOPHILS 1 0 - 1 %    IMMATURE GRANULOCYTES 1 (H) 0.0 - 0.5 %    ABS. NEUTROPHILS 3.0 1.8 - 8.0 K/UL    ABS. LYMPHOCYTES 2.9 0.8 - 3.5 K/UL    ABS. MONOCYTES 0.6 0.0 - 1.0 K/UL    ABS. EOSINOPHILS 0.4 0.0 - 0.4 K/UL    ABS. BASOPHILS 0.1 0.0 - 0.1 K/UL    ABS. IMM. GRANS. 0.0 0.00 - 0.04 K/UL    DF AUTOMATED     EKG, 12 LEAD, INITIAL   Result Value Ref Range    Ventricular Rate 60 BPM    Atrial Rate 60 BPM    P-R Interval 178 ms    QRS Duration 92 ms    Q-T Interval 434 ms    QTC Calculation (Bezet) 434 ms    Calculated P Axis 23 degrees    Calculated R Axis 5 degrees    Calculated T Axis 25 degrees    Diagnosis       Normal sinus rhythm  Normal ECG  When compared with ECG of 15-MAR-2018 12:39,  No significant change was found  Confirmed by Mark Preston M.D., Darrel Grijalva ([id number]) on 5/21/2019 3:02:56 PM         Immunizations administered during this encounter:   Immunization History   Administered Date(s) Administered    Influenza Vaccine 10/01/2017       Screening for Metabolic Disorders for Patients on Antipsychotic Medications  (Data obtained from the EMR)    Estimated Body Mass Index  Estimated body mass index is 31.8 kg/m² as calculated from the following:    Height as of this encounter: 6' 1\" (1.854 m). Weight as of this encounter: 109.3 kg (241 lb).      Vital Signs/Blood Pressure  Visit Vitals  /89 (BP 1 Location: Right arm, BP Patient Position: At rest;Supine)   Pulse 62   Temp 97.5 °F (36.4 °C)   Resp 17   Ht 6' 1\" (1.854 m)   Wt 109.3 kg (241 lb)   SpO2 95%   BMI 31.80 kg/m²       Blood Glucose/Hemoglobin A1c  Lab Results   Component Value Date/Time    Glucose 95 2019 07:10 AM    Glucose (POC) 150 (H) 2018 11:45 AM       Lab Results   Component Value Date/Time    Hemoglobin A1c 4.8 2018 04:29 AM        Lipid Panel  Lab Results   Component Value Date/Time    Cholesterol, total 152 2012 03:30 AM    HDL Cholesterol 26 2012 03:30 AM    LDL, calculated 92.2 2012 03:30 AM    Triglyceride 169 (H) 2012 03:30 AM    CHOL/HDL Ratio 5.8 (H) 2012 03:30 AM        Discharge Diagnosis: Depression (ICD-10-CM: F32.9)    Discharge Plan: Patient discharged to his brother's home via [place]. DISCHARGE SUMMARY    NAME:Wei Carrillo  : 1965  MRN: [id number]    The patient Romulo Kovacs exhibits the ability to control behavior in a less restrictive environment. Patient's level of functioning is improving. No assaultive/destructive behavior has been observed for the past 24 hours. No suicidal/homicidal threat or behavior has been observed for the past 24 hours. There is no evidence of serious medication side effects. Patient has not been in physical or protective restraints for at least the past 24 hours. If weapons involved, how are they secured? No access to weapons     Is patient aware of and in agreement with discharge plan? Patient is aware of discharge and is in agreement     Arrangements for medication:  Prescription given to patient. Copy of discharge instructions to  provider?:  Yes, fax to PCP     Arrangements for transportation home:  Aileen    Keep all follow up appointments as scheduled, continue to take prescribed medications per physician instructions. Mental health crisis number:  125 or your local mental health crisis line number at [phone] [facility]) or [phone] [facility]).     Discharge Medication List and Instructions:   Current Discharge Medication List      CONTINUE these medications which have CHANGED    Details   allopurinol (ZYLOPRIM) 100 mg tablet Take 1 Tab by mouth daily. Indications: treatment to prevent acute gout attack  Qty: 30 Tab, Refills: 0      levothyroxine (SYNTHROID) 100 mcg tablet Take 1 Tab by mouth Daily (before breakfast). Indications: hypothyroidism  Qty: 30 Tab, Refills: 0      tamsulosin (FLOMAX) 0.4 mg capsule Take 1 Cap by mouth daily. Indications: bladder cancer  Qty: 30 Cap, Refills: 0      sertraline (ZOLOFT) 50 mg tablet Take 1 Tab by mouth daily. Indications: major depressive disorder  Qty: 30 Tab, Refills: 0         CONTINUE these medications which have NOT CHANGED    Details   senna-docusate (DARELL-COLACE) 8.6-50 mg per tablet Take 1 Tab by mouth daily. lisinopril (PRINIVIL, ZESTRIL) 20 mg tablet Take 2 Tabs by mouth daily. Indications: high blood pressure  Qty: 30 Tab, Refills: 0      amLODIPine (NORVASC) 10 mg tablet Take 1 Tab by mouth daily. Qty: 30 Tab, Refills: 0      methadone (DOLOPHINE) 10 mg/mL solution Take 130 mg by mouth daily. colchicine 0.6 mg tablet Take 0.6 mg by mouth daily as needed (gout flare). acetaminophen (TYLENOL) 325 mg tablet Take 2 Tabs by mouth every four (4) hours as needed. Indications: Arthritic Pain  Qty: 30 Tab, Refills: 0      melatonin 3 mg tablet Take 1 Tab by mouth nightly as needed.   Qty: 10 Tab, Refills: 0         STOP taking these medications       gabapentin (NEURONTIN) 400 mg capsule Comments:   Reason for Stopping:         ibuprofen (MOTRIN) 600 mg tablet Comments:   Reason for Stopping:               Unresulted Labs (24h ago, onward)    None        To obtain results of studies pending at discharge, please contact [phone]    Follow-up Information     Follow up With Specialties Details Why Contact Kenny Ayers DPM Podiatry Schedule an appointment as soon as possible for a visit on 5/30/2019 you have a 3:00 appointment  [address] [facility] [address]  [phone]      [facility]  Go to  [phone]     Jos Scott MD Urology Schedule an appointment as soon as possible for a visit  [address] [place] [address] Myrick Heavenly Obando MD Internal Medicine On 5/28/2019 you have a 8:40 appointment  [address] [facility]  [phone]      Leandra Bhat MD Internal Medicine   [address] [place] [address]  [phone]            Advanced Directive:   Does the patient have an appointed surrogate decision maker? No  Does the patient have a Medical Advance Directive? No  Does the patient have a Psychiatric Advance Directive? No  If the patient does not have a surrogate or Medical Advance Directive AND Psychiatric Advance Directive, the patient was offered information on these advance directives Yes and Patient declined to complete    Patient Instructions: Please continue all medications until otherwise directed by physician. Tobacco Cessation Discharge Plan:   Is the patient a smoker and needs referral for smoking cessation? Yes  Patient referred to the following for smoking cessation with an appointment? Refused     Patient was offered medication to assist with smoking cessation at discharge? Refused  Was education for smoking cessation added to the discharge instructions? Yes    Alcohol/Substance Abuse Discharge Plan:   Does the patient have a history of substance/alcohol abuse and requires a referral for treatment? Yes  Patient referred to the following for substance/alcohol abuse treatment with an appointment? Yes, Patient has an appointment with Eileen Mead on 6/37/10 at 8:40am.  Patient was offered medication to assist with alcohol cessation at discharge? Refused  Was education for substance/alcohol abuse added to discharge instructions?  Yes    Patient discharged to Home; discussed with patient/caregiver and provided to the patient/caregiver either in hard copy or electronically.

## 2019-05-27 NOTE — INTERDISCIPLINARY ROUNDS
Behavioral Health Interdisciplinary Rounds     Patient Name: Anand Harper  Age: 46 y.o. Room/Bed:  744/02  Primary Diagnosis: <principal problem not specified>   Admission Status: Voluntary     Readmission within 30 days: no  Power of  in place: no  Patient requires a blocked bed: no          Reason for blocked bed:     VTE Prophylaxis: Not indicated    Mobility needs/Fall risk: yes  Flu Vaccine : no   Nutritional Plan: no  Consults:          Labs/Testing due today?: no    Sleep hours:  3.5      Participation in Care/Groups:  yes  Medication Compliant?: Yes  PRNS (last 24 hours): Pain    Restraints (last 24 hours):  no     CIWA (range last 24 hours):     COWS (range last 24 hours):      Alcohol screening (AUDIT) completed -   AUDIT Score: 0     If applicable, date SBIRT discussed in treatment team AND documented:   AUDIT Screen Score: AUDIT Score: 0    Tobacco - patient is a smoker: Have You Used Tobacco in the Past 30 Days: Yes  Illegal Drugs use: Have You Used Any Illegal Substances Over the Past 12 Months: No    24 hour chart check complete: yes     Patient goal(s) for today:   Treatment team focus/goals:  Work on discharge  Progress note: Patient is stable and ready for discharge; Pt's ride has not answered the phone since last week    LOS:  7  Expected LOS: 7    Financial concerns/prescription coverage: [place] Medicaid  Date of last family contact: None      Family requesting physician contact today: No  Discharge plan: Return home  Guns in the home: No      Outpatient provider(s): Dr. Louisa Olvera (PCP)    Participating treatment team members: Anand Harper, JUVENCIO Prescott; Grant Deal NP; Nasima Cleaning RN

## 2019-05-27 NOTE — BH NOTES
PRN Medication Documentation    Specific patient behavior that led to need for PRN medication: pt asked for trazadone to promote rest  Staff interventions attempted prior to PRN being given: bedtime snack, decrease stimuli, dim lights, emotional support and encouragement  PRN medication given: trazadone  Patient response/effectiveness of PRN medication: to be assessed

## 2019-05-27 NOTE — BH NOTES
GROUP THERAPY PROGRESS NOTE    Donnamaria Officer [[place]] did not participate in a 45 minute Process Group on the Geriatric Unit with a focus on shifting ones feelings to a positive note and preparing for the day through live music.

## 2019-05-29 ENCOUNTER — HOSPITAL ENCOUNTER (INPATIENT)
Age: 54
LOS: 20 days | Discharge: HOME HEALTH | DRG: 383 | End: 2019-06-19
Attending: EMERGENCY MEDICINE | Admitting: INTERNAL MEDICINE
Payer: MEDICAID

## 2019-05-29 DIAGNOSIS — S81.802A OPEN WOUND OF LEFT LOWER LEG, INITIAL ENCOUNTER: ICD-10-CM

## 2019-05-29 DIAGNOSIS — L08.9 LEFT FOOT INFECTION: Primary | ICD-10-CM

## 2019-05-29 PROCEDURE — 99284 EMERGENCY DEPT VISIT MOD MDM: CPT

## 2019-05-30 ENCOUNTER — APPOINTMENT (OUTPATIENT)
Dept: GENERAL RADIOLOGY | Age: 54
DRG: 383 | End: 2019-05-30
Attending: PHYSICIAN ASSISTANT
Payer: MEDICAID

## 2019-05-30 PROBLEM — S81.809A OPEN WOUND, LOWER LEG: Status: ACTIVE | Noted: 2019-05-30

## 2019-05-30 LAB
ALBUMIN SERPL-MCNC: 4 G/DL (ref 3.5–5)
ALBUMIN/GLOB SERPL: 1.1 {RATIO} (ref 1.1–2.2)
ALP SERPL-CCNC: 127 U/L (ref 45–117)
ALT SERPL-CCNC: 35 U/L (ref 12–78)
ANION GAP SERPL CALC-SCNC: 8 MMOL/L (ref 5–15)
APAP SERPL-MCNC: <2 UG/ML (ref 10–30)
AST SERPL-CCNC: 20 U/L (ref 15–37)
BASOPHILS # BLD: 0.1 K/UL (ref 0–0.1)
BASOPHILS NFR BLD: 1 % (ref 0–1)
BILIRUB SERPL-MCNC: 0.4 MG/DL (ref 0.2–1)
BUN SERPL-MCNC: 5 MG/DL (ref 6–20)
BUN/CREAT SERPL: 7 (ref 12–20)
CALCIUM SERPL-MCNC: 8.8 MG/DL (ref 8.5–10.1)
CHLORIDE SERPL-SCNC: 103 MMOL/L (ref 97–108)
CO2 SERPL-SCNC: 27 MMOL/L (ref 21–32)
COMMENT, HOLDF: NORMAL
CREAT SERPL-MCNC: 0.71 MG/DL (ref 0.7–1.3)
CRP SERPL-MCNC: 3.92 MG/DL (ref 0–0.6)
DIFFERENTIAL METHOD BLD: ABNORMAL
EOSINOPHIL # BLD: 0.5 K/UL (ref 0–0.4)
EOSINOPHIL NFR BLD: 4 % (ref 0–7)
ERYTHROCYTE [DISTWIDTH] IN BLOOD BY AUTOMATED COUNT: 14 % (ref 11.5–14.5)
ERYTHROCYTE [SEDIMENTATION RATE] IN BLOOD: 31 MM/HR (ref 0–20)
ETHANOL SERPL-MCNC: 112 MG/DL
GLOBULIN SER CALC-MCNC: 3.7 G/DL (ref 2–4)
GLUCOSE SERPL-MCNC: 97 MG/DL (ref 65–100)
HCT VFR BLD AUTO: 37.1 % (ref 36.6–50.3)
HGB BLD-MCNC: 12.1 G/DL (ref 12.1–17)
IMM GRANULOCYTES # BLD AUTO: 0.1 K/UL (ref 0–0.04)
IMM GRANULOCYTES NFR BLD AUTO: 1 % (ref 0–0.5)
LACTATE BLD-SCNC: 2.28 MMOL/L (ref 0.4–2)
LYMPHOCYTES # BLD: 3.7 K/UL (ref 0.8–3.5)
LYMPHOCYTES NFR BLD: 29 % (ref 12–49)
MCH RBC QN AUTO: 28.7 PG (ref 26–34)
MCHC RBC AUTO-ENTMCNC: 32.6 G/DL (ref 30–36.5)
MCV RBC AUTO: 88.1 FL (ref 80–99)
MONOCYTES # BLD: 0.9 K/UL (ref 0–1)
MONOCYTES NFR BLD: 7 % (ref 5–13)
NEUTS SEG # BLD: 7.5 K/UL (ref 1.8–8)
NEUTS SEG NFR BLD: 58 % (ref 32–75)
NRBC # BLD: 0 K/UL (ref 0–0.01)
NRBC BLD-RTO: 0 PER 100 WBC
PLATELET # BLD AUTO: 280 K/UL (ref 150–400)
PMV BLD AUTO: 10 FL (ref 8.9–12.9)
POTASSIUM SERPL-SCNC: 3.4 MMOL/L (ref 3.5–5.1)
PROT SERPL-MCNC: 7.7 G/DL (ref 6.4–8.2)
RBC # BLD AUTO: 4.21 M/UL (ref 4.1–5.7)
SALICYLATES SERPL-MCNC: 2.2 MG/DL (ref 2.8–20)
SAMPLES BEING HELD,HOLD: NORMAL
SODIUM SERPL-SCNC: 138 MMOL/L (ref 136–145)
WBC # BLD AUTO: 12.8 K/UL (ref 4.1–11.1)

## 2019-05-30 PROCEDURE — 74011250636 HC RX REV CODE- 250/636: Performed by: PHYSICIAN ASSISTANT

## 2019-05-30 PROCEDURE — 87205 SMEAR GRAM STAIN: CPT

## 2019-05-30 PROCEDURE — 73620 X-RAY EXAM OF FOOT: CPT

## 2019-05-30 PROCEDURE — 77030011256 HC DRSG MEPILEX <16IN NO BORD MOLN -A

## 2019-05-30 PROCEDURE — 85652 RBC SED RATE AUTOMATED: CPT

## 2019-05-30 PROCEDURE — 80307 DRUG TEST PRSMV CHEM ANLYZR: CPT

## 2019-05-30 PROCEDURE — 87077 CULTURE AEROBIC IDENTIFY: CPT

## 2019-05-30 PROCEDURE — 65270000029 HC RM PRIVATE

## 2019-05-30 PROCEDURE — 87186 SC STD MICRODIL/AGAR DIL: CPT

## 2019-05-30 PROCEDURE — 86140 C-REACTIVE PROTEIN: CPT

## 2019-05-30 PROCEDURE — 74011250637 HC RX REV CODE- 250/637: Performed by: INTERNAL MEDICINE

## 2019-05-30 PROCEDURE — 80053 COMPREHEN METABOLIC PANEL: CPT

## 2019-05-30 PROCEDURE — 74011000258 HC RX REV CODE- 258: Performed by: INTERNAL MEDICINE

## 2019-05-30 PROCEDURE — 36415 COLL VENOUS BLD VENIPUNCTURE: CPT

## 2019-05-30 PROCEDURE — 74011250636 HC RX REV CODE- 250/636: Performed by: INTERNAL MEDICINE

## 2019-05-30 PROCEDURE — 83605 ASSAY OF LACTIC ACID: CPT

## 2019-05-30 PROCEDURE — 85025 COMPLETE CBC W/AUTO DIFF WBC: CPT

## 2019-05-30 PROCEDURE — 74011000250 HC RX REV CODE- 250: Performed by: PHYSICIAN ASSISTANT

## 2019-05-30 PROCEDURE — 87040 BLOOD CULTURE FOR BACTERIA: CPT

## 2019-05-30 RX ORDER — MAG HYDROX/ALUMINUM HYD/SIMETH 200-200-20
30 SUSPENSION, ORAL (FINAL DOSE FORM) ORAL
Status: DISCONTINUED | OUTPATIENT
Start: 2019-05-30 | End: 2019-06-19 | Stop reason: HOSPADM

## 2019-05-30 RX ORDER — IBUPROFEN 200 MG
1 TABLET ORAL
Status: DISCONTINUED | OUTPATIENT
Start: 2019-05-30 | End: 2019-06-19 | Stop reason: HOSPADM

## 2019-05-30 RX ORDER — OXYCODONE HYDROCHLORIDE 5 MG/1
10 TABLET ORAL
Status: DISCONTINUED | OUTPATIENT
Start: 2019-05-30 | End: 2019-06-15

## 2019-05-30 RX ORDER — ALLOPURINOL 100 MG/1
TABLET ORAL
Status: DISPENSED
Start: 2019-05-30 | End: 2019-05-30

## 2019-05-30 RX ORDER — SERTRALINE HYDROCHLORIDE 50 MG/1
50 TABLET, FILM COATED ORAL DAILY
Status: DISCONTINUED | OUTPATIENT
Start: 2019-05-30 | End: 2019-06-19 | Stop reason: HOSPADM

## 2019-05-30 RX ORDER — AMLODIPINE BESYLATE 5 MG/1
TABLET ORAL
Status: DISPENSED
Start: 2019-05-30 | End: 2019-05-30

## 2019-05-30 RX ORDER — ALLOPURINOL 100 MG/1
100 TABLET ORAL DAILY
Status: DISCONTINUED | OUTPATIENT
Start: 2019-05-30 | End: 2019-06-19 | Stop reason: HOSPADM

## 2019-05-30 RX ORDER — METHADONE HYDROCHLORIDE 10 MG/ML
130 CONCENTRATE ORAL DAILY
Status: DISCONTINUED | OUTPATIENT
Start: 2019-05-30 | End: 2019-06-19 | Stop reason: HOSPADM

## 2019-05-30 RX ORDER — AMLODIPINE BESYLATE 5 MG/1
10 TABLET ORAL DAILY
Status: DISCONTINUED | OUTPATIENT
Start: 2019-05-30 | End: 2019-06-19 | Stop reason: HOSPADM

## 2019-05-30 RX ORDER — LISINOPRIL 20 MG/1
40 TABLET ORAL DAILY
Status: DISCONTINUED | OUTPATIENT
Start: 2019-05-30 | End: 2019-06-19 | Stop reason: HOSPADM

## 2019-05-30 RX ORDER — SERTRALINE HYDROCHLORIDE 50 MG/1
TABLET, FILM COATED ORAL
Status: DISPENSED
Start: 2019-05-30 | End: 2019-05-30

## 2019-05-30 RX ORDER — COLCHICINE 0.6 MG/1
0.6 TABLET ORAL
Status: DISCONTINUED | OUTPATIENT
Start: 2019-05-30 | End: 2019-06-19 | Stop reason: HOSPADM

## 2019-05-30 RX ORDER — LANOLIN ALCOHOL/MO/W.PET/CERES
3 CREAM (GRAM) TOPICAL
Status: DISCONTINUED | OUTPATIENT
Start: 2019-05-30 | End: 2019-06-19 | Stop reason: HOSPADM

## 2019-05-30 RX ORDER — LISINOPRIL 20 MG/1
TABLET ORAL
Status: DISPENSED
Start: 2019-05-30 | End: 2019-05-30

## 2019-05-30 RX ORDER — ENOXAPARIN SODIUM 100 MG/ML
40 INJECTION SUBCUTANEOUS EVERY 24 HOURS
Status: DISCONTINUED | OUTPATIENT
Start: 2019-05-30 | End: 2019-06-19 | Stop reason: HOSPADM

## 2019-05-30 RX ORDER — LEVOTHYROXINE SODIUM 100 UG/1
100 TABLET ORAL
Status: DISCONTINUED | OUTPATIENT
Start: 2019-05-30 | End: 2019-06-19 | Stop reason: HOSPADM

## 2019-05-30 RX ORDER — AMOXICILLIN 250 MG
1 CAPSULE ORAL DAILY
Status: DISCONTINUED | OUTPATIENT
Start: 2019-05-30 | End: 2019-06-19 | Stop reason: HOSPADM

## 2019-05-30 RX ORDER — AMOXICILLIN 250 MG
CAPSULE ORAL
Status: DISPENSED
Start: 2019-05-30 | End: 2019-05-30

## 2019-05-30 RX ORDER — MAG HYDROX/ALUMINUM HYD/SIMETH 200-200-20
SUSPENSION, ORAL (FINAL DOSE FORM) ORAL
Status: DISPENSED
Start: 2019-05-30 | End: 2019-05-30

## 2019-05-30 RX ORDER — ACETAMINOPHEN 325 MG/1
650 TABLET ORAL
Status: DISCONTINUED | OUTPATIENT
Start: 2019-05-30 | End: 2019-06-19 | Stop reason: HOSPADM

## 2019-05-30 RX ORDER — DIPHENHYDRAMINE HCL 25 MG
25 CAPSULE ORAL
Status: DISCONTINUED | OUTPATIENT
Start: 2019-05-30 | End: 2019-06-19 | Stop reason: HOSPADM

## 2019-05-30 RX ORDER — ONDANSETRON 2 MG/ML
4 INJECTION INTRAMUSCULAR; INTRAVENOUS
Status: DISCONTINUED | OUTPATIENT
Start: 2019-05-30 | End: 2019-06-19 | Stop reason: HOSPADM

## 2019-05-30 RX ORDER — LEVOTHYROXINE SODIUM 100 UG/1
100 TABLET ORAL
Status: DISCONTINUED | OUTPATIENT
Start: 2019-05-30 | End: 2019-05-30

## 2019-05-30 RX ORDER — PANTOPRAZOLE SODIUM 40 MG/1
40 TABLET, DELAYED RELEASE ORAL
Status: DISCONTINUED | OUTPATIENT
Start: 2019-05-30 | End: 2019-06-19 | Stop reason: HOSPADM

## 2019-05-30 RX ORDER — POTASSIUM CHLORIDE 750 MG/1
40 TABLET, FILM COATED, EXTENDED RELEASE ORAL
Status: COMPLETED | OUTPATIENT
Start: 2019-05-30 | End: 2019-05-30

## 2019-05-30 RX ORDER — METHADONE HYDROCHLORIDE 10 MG/ML
CONCENTRATE ORAL
Status: DISPENSED
Start: 2019-05-30 | End: 2019-05-30

## 2019-05-30 RX ORDER — LEVOTHYROXINE SODIUM 100 UG/1
TABLET ORAL
Status: DISPENSED
Start: 2019-05-30 | End: 2019-05-30

## 2019-05-30 RX ORDER — SODIUM CHLORIDE 0.9 % (FLUSH) 0.9 %
5-40 SYRINGE (ML) INJECTION AS NEEDED
Status: DISCONTINUED | OUTPATIENT
Start: 2019-05-30 | End: 2019-06-19 | Stop reason: HOSPADM

## 2019-05-30 RX ORDER — PANTOPRAZOLE SODIUM 40 MG/1
TABLET, DELAYED RELEASE ORAL
Status: DISPENSED
Start: 2019-05-30 | End: 2019-05-30

## 2019-05-30 RX ORDER — OXYCODONE AND ACETAMINOPHEN 10; 325 MG/1; MG/1
1 TABLET ORAL
Status: DISCONTINUED | OUTPATIENT
Start: 2019-05-30 | End: 2019-05-30

## 2019-05-30 RX ORDER — OXYCODONE HYDROCHLORIDE 5 MG/1
TABLET ORAL
Status: DISPENSED
Start: 2019-05-30 | End: 2019-05-31

## 2019-05-30 RX ORDER — TAMSULOSIN HYDROCHLORIDE 0.4 MG/1
CAPSULE ORAL
Status: DISPENSED
Start: 2019-05-30 | End: 2019-05-30

## 2019-05-30 RX ORDER — TAMSULOSIN HYDROCHLORIDE 0.4 MG/1
0.4 CAPSULE ORAL DAILY
Status: DISCONTINUED | OUTPATIENT
Start: 2019-05-30 | End: 2019-06-19 | Stop reason: HOSPADM

## 2019-05-30 RX ORDER — OLANZAPINE 2.5 MG/1
5 TABLET ORAL
Status: DISCONTINUED | OUTPATIENT
Start: 2019-05-30 | End: 2019-06-19 | Stop reason: HOSPADM

## 2019-05-30 RX ORDER — POTASSIUM CHLORIDE 750 MG/1
TABLET, FILM COATED, EXTENDED RELEASE ORAL
Status: DISPENSED
Start: 2019-05-30 | End: 2019-05-30

## 2019-05-30 RX ORDER — SODIUM CHLORIDE 0.9 % (FLUSH) 0.9 %
5-40 SYRINGE (ML) INJECTION EVERY 8 HOURS
Status: DISCONTINUED | OUTPATIENT
Start: 2019-05-30 | End: 2019-06-19 | Stop reason: HOSPADM

## 2019-05-30 RX ADMIN — SODIUM CHLORIDE 50 MG: 900 INJECTION, SOLUTION INTRAVENOUS at 14:16

## 2019-05-30 RX ADMIN — SODIUM CHLORIDE 1000 ML: 900 INJECTION, SOLUTION INTRAVENOUS at 00:22

## 2019-05-30 RX ADMIN — ALLOPURINOL 100 MG: 100 TABLET ORAL at 10:38

## 2019-05-30 RX ADMIN — OXYCODONE HYDROCHLORIDE 10 MG: 5 TABLET ORAL at 20:43

## 2019-05-30 RX ADMIN — METHADONE HYDROCHLORIDE 130 MG: 10 CONCENTRATE ORAL at 10:13

## 2019-05-30 RX ADMIN — Medication 10 ML: at 05:28

## 2019-05-30 RX ADMIN — ALUMINUM HYDROXIDE, MAGNESIUM HYDROXIDE, AND SIMETHICONE 30 ML: 200; 200; 20 SUSPENSION ORAL at 10:36

## 2019-05-30 RX ADMIN — ENOXAPARIN SODIUM 40 MG: 40 INJECTION SUBCUTANEOUS at 05:28

## 2019-05-30 RX ADMIN — ACETAMINOPHEN 650 MG: 325 TABLET ORAL at 06:58

## 2019-05-30 RX ADMIN — TAMSULOSIN HYDROCHLORIDE 0.4 MG: 0.4 CAPSULE ORAL at 10:38

## 2019-05-30 RX ADMIN — ACETAMINOPHEN 650 MG: 325 TABLET ORAL at 16:46

## 2019-05-30 RX ADMIN — AMLODIPINE BESYLATE 10 MG: 5 TABLET ORAL at 10:37

## 2019-05-30 RX ADMIN — SENNOSIDES AND DOCUSATE SODIUM 1 TABLET: 8.6; 5 TABLET ORAL at 10:38

## 2019-05-30 RX ADMIN — SERTRALINE HYDROCHLORIDE 50 MG: 50 TABLET ORAL at 10:38

## 2019-05-30 RX ADMIN — Medication 10 ML: at 14:17

## 2019-05-30 RX ADMIN — Medication 10 ML: at 20:44

## 2019-05-30 RX ADMIN — ACETAMINOPHEN 650 MG: 325 TABLET ORAL at 22:37

## 2019-05-30 RX ADMIN — ALUMINUM HYDROXIDE, MAGNESIUM HYDROXIDE, AND SIMETHICONE 30 ML: 200; 200; 20 SUSPENSION ORAL at 20:53

## 2019-05-30 RX ADMIN — CEFTRIAXONE 2 G: 2 INJECTION, POWDER, FOR SOLUTION INTRAMUSCULAR; INTRAVENOUS at 20:45

## 2019-05-30 RX ADMIN — WATER 2 G: 1 INJECTION INTRAMUSCULAR; INTRAVENOUS; SUBCUTANEOUS at 01:10

## 2019-05-30 RX ADMIN — OXYCODONE HYDROCHLORIDE 10 MG: 5 TABLET ORAL at 15:11

## 2019-05-30 RX ADMIN — POTASSIUM CHLORIDE 40 MEQ: 750 TABLET, FILM COATED, EXTENDED RELEASE ORAL at 10:13

## 2019-05-30 RX ADMIN — PANTOPRAZOLE SODIUM 40 MG: 40 TABLET, DELAYED RELEASE ORAL at 10:39

## 2019-05-30 RX ADMIN — ONDANSETRON 4 MG: 2 INJECTION INTRAMUSCULAR; INTRAVENOUS at 10:19

## 2019-05-30 RX ADMIN — LEVOTHYROXINE SODIUM 100 MCG: 100 TABLET ORAL at 10:38

## 2019-05-30 RX ADMIN — LISINOPRIL 40 MG: 20 TABLET ORAL at 10:37

## 2019-05-30 NOTE — PROGRESS NOTES
Bedside and Verbal shift change report given to HARLEEN Greene (oncoming nurse) by Dayanara Odonnell (offgoing nurse). Report included the following information SBAR, Kardex, ED Summary, Intake/Output, MAR and Recent Results.

## 2019-05-30 NOTE — ED NOTES
Poison control Jennifer [phone], looking for acetaminophen and salicylate levels when available.

## 2019-05-30 NOTE — ED TRIAGE NOTES
Pt presents to the ED via EMS with c/o left foot pain. Pt reports that he had surgery on the foot x3 weeks ago. Reports taking two bottles of advil for pain in 48 hours. Also is intoxicated with ETOH after drinking 2 wicked red[employer] apple parminder tall boys.

## 2019-05-30 NOTE — ED PROVIDER NOTES
Elvia Knox is a 46 y.o. male with PMH of HTN, seizures, brain aneurysm, TIA, bladder CA, alcoholism, chronic wound to the left foot presents to emergency room via EMS for evaluation of two wounds, one to medial aspect and one at the base of 2nd toe with increased pain from his baseline chronic pain and states inability to walk on his left foot due to the pain as well as foul-smelling drainage from wound. Per chart review: \"s/p left foot infection: I&D of ulcer with bx by podiatry on 4/27. CT showed no drainable abscess or e/o osteomyelitis. Wound culture polymicrobial, including enterococcus, Providencia, and Stenotrophomonas. ID consulted and recommended IV tigecycline and CTX till 5/10/19. \"  He woke up with chills today but denies fever. Patient states he spoke with Dr. Nilsa Anderson at [address] [place] [address] and states he told patient if he has uncontrollable pain and cannot wait for his scheduled appointment tomorrow to come to the ER. Of note he was admitted to [facility] from 5/20-27 for suicidal ideation and states wound care was only done once during that admission. He denies SI, HI or self-harmful thoughts since discharge. He admits to taking 2 bottles of ibuprofen over the past 48 hours for pain control, stating the pills had #30 tablets in each bottle, last dose was 2 pills at 10pm. He denies ABD pain, vomiting, nausea, fever, chills, CP, SOB. PCP: Haile Mares MD  Podiatry: [facility]    Surgical hx- see chart  Social hx- + tobacco, + ETOH    The patient has no other complaints at this time.              Past Medical History:   Diagnosis Date    Aneurysm [facility])     (with stroke)    Bladder tumor     Cancer ([facility] [address]     bladder CA    Chronic pain     Family history of bladder cancer     Gout     History of vascular access device 04/25/2019    [facility] VAT 4 FR MIDLINE R Cephalic Limited access    History of vascular access device 04/26/2019    4 fr Midline right Cephalic midline access    Hypercholesterolemia     Hypertension     Lesion of bladder     Seizures ([facility] [address]     Stroke [facility]) 2006    left sided weakness    Thromboembolus ([facility] [address]     Thyroid disease     TIA (transient ischemic attack) 2012       Past Surgical History:   Procedure Laterality Date    HX ORTHOPAEDIC      R arthroscopy    HX OTHER SURGICAL      x2 L foot    HX UROLOGICAL  04/20/2018    Cystoscopy, TURBT (greater than 5 cm resected) Dr. Arnold Clay, [facility].  KNEE ARTHROSCP HARV      left knee    TRANSURETHRAL RESEC BLADDER NECK  08/10/2018         Family History:   Problem Relation Age of Onset    Diabetes Father     Lung Disease Father     Diabetes Sister     Diabetes Brother     Cancer Paternal Grandfather         Bladder       Social History     Socioeconomic History    Marital status:      Spouse name: Not on file    Number of children: Not on file    Years of education: Not on file    Highest education level: Not on file   Occupational History    Not on file   Social Needs    Financial resource strain: Not very hard    Food insecurity:     Worry: Never true     Inability: Never true    Transportation needs:     Medical: No     Non-medical: No   Tobacco Use    Smoking status: Current Every Day Smoker     Packs/day: 1.00    Smokeless tobacco: Never Used   Substance and Sexual Activity    Alcohol use: Yes     Alcohol/week: 8.4 oz     Types: 14 Cans of beer per week     Comment: occasional    Drug use: Yes     Types: Prescription    Sexual activity: Yes   Lifestyle    Physical activity:     Days per week: 7 days     Minutes per session: 30 min    Stress: Not at all   Relationships    Social connections:     Talks on phone: Not on file     Gets together:  Three times a week     Attends [employer] service: Patient refused     Active member of club or organization: Patient refused     Attends meetings of clubs or organizations: Never     Relationship status:     Intimate partner violence:     Fear of current or ex partner: No     Emotionally abused: No     Physically abused: No     Forced sexual activity: No   Other Topics Concern     Service No    Blood Transfusions No     Comment: refused to make decision    Caffeine Concern No    Occupational Exposure No    Hobby Hazards No    Sleep Concern No    Stress Concern No    Weight Concern No    Special Diet No    Back Care No    Exercise No    Bike Helmet No    Seat Belt Yes    Self-Exams No   Social History Narrative    60year old  male admitted for reported SI in the context of bladder CA, chronic pain, homelessness, and opiod dependence. Pt has been in multiple psychiatric admissions for the same compliants in the last 2 years,         ALLERGIES: Sulfamethoxazole-trimethoprim; Ciprofloxacin; Codeine; Cymbalta [duloxetine]; Gabapentin; Lyrica [pregabalin]; Sulfa (sulfonamide antibiotics); Ultram [tramadol]; and Vancomycin    Review of Systems   Constitutional: Negative. Negative for activity change, chills, fatigue and unexpected weight change. HENT: Negative for trouble swallowing. Respiratory: Negative for cough, chest tightness, shortness of breath and wheezing. Cardiovascular: Negative. Negative for chest pain and palpitations. Gastrointestinal: Negative. Negative for abdominal pain, diarrhea, nausea and vomiting. Genitourinary: Negative. Negative for dysuria, flank pain, frequency and hematuria. Musculoskeletal: Positive for arthralgias and gait problem. Negative for back pain, neck pain and neck stiffness. Skin: Positive for wound. Negative for color change and rash. Neurological: Negative for dizziness, numbness and headaches. All other systems reviewed and are negative. Vitals:    05/29/19 2321   BP: (!) 133/112   Pulse: 86   Resp: 16   Temp: 97.8 °F (36.6 °C)   SpO2: 98%   Weight: 108.9 kg (240 lb)   Height: 6' 1\" (1.854 m)            Physical Exam   Constitutional: He is oriented to person, place, and time. He appears well-developed and well-nourished. He is active. Non-toxic appearance. No distress. HENT:   Head: Normocephalic and atraumatic. Eyes: Pupils are equal, round, and reactive to light. Conjunctivae are normal. Right eye exhibits no discharge. Left eye exhibits no discharge. Neck: Normal range of motion and full passive range of motion without pain. No tracheal tenderness present. Cardiovascular: Normal rate, regular rhythm, normal heart sounds, intact distal pulses and normal pulses. Exam reveals no gallop and no friction rub. No murmur heard. Pulmonary/Chest: Effort normal and breath sounds normal. No respiratory distress. He has no wheezes. He has no rales. He exhibits no tenderness. Abdominal: Soft. Bowel sounds are normal. He exhibits no distension. There is no tenderness. There is no rebound and no guarding. Musculoskeletal: Normal range of motion. He exhibits no edema or tenderness. Neurological: He is alert and oriented to person, place, and time. He has normal strength. No cranial nerve deficit or sensory deficit. Coordination normal.   Skin: Skin is warm, dry and intact. Capillary refill takes less than 2 seconds. No abrasion and no rash noted. He is not diaphoretic. No erythema. Wound to medial ankle with sloughed skin to heel with surrounding erythema and serous foul-smelling drainage noted on pad below foot. Open wound to base of 2nd/3rd toe. Distal pulses 2+. LROM of toes, states this is baseline since CVA. Psychiatric: He has a normal mood and affect. His speech is normal and behavior is normal. Cognition and memory are normal.   Nursing note and vitals reviewed.                MDM  Number of Diagnoses or Management Options  Diagnosis management comments:   Ddx: sepsis, wound infection       Amount and/or Complexity of Data Reviewed  Clinical lab tests: ordered and reviewed  Tests in the radiology section of CPT®: ordered and reviewed  Review and summarize past medical records: yes  Discuss the patient with other providers: yes    Patient Progress  Patient progress: stable         Procedures      Patient states intentionally taking #60 ibuprofen 200mg tablets over the past 48 hours due to his pain. I spoke with Jennifer from [facility] who states he is under the toxic dose effect. They states under 200mg/kg there shouldn't be many symptoms, stating even if he took all #60 tablets that puts him in the 110mg/kg range. Watch for HA or GI upset, no medical intervention needed. She stated to add ASA / acetaminophen levels to be sure he did not also take these. RITA C[place] Dr. saw and examined patient and agrees with care plan, will order labs, XR, start on abx and admit to hospital.   Sukh Honeycutt PA-C      CONSULT NOTE:   12:52 AM  Sukh Honeycutt PA-C spoke with Dr. Stacy Ochoa,   Specialty: Hospitalist  Discussed pt's hx, disposition, and available diagnostic and imaging results. Reviewed care plans. Consultant agrees with plans as outlined. He will see and admit patient, recommends to putting patient back on IV ceftriaxone and TIGEcycline as per last ID recommendation based on most recent cx/sensitivites.    Written by Sukh Honeycutt PA-C.

## 2019-05-30 NOTE — CONSULTS
[address]     Name:  Zara Kruse  MR#:  [id number]  :  1965  ACCOUNT #:  [de-identified]  DATE OF SERVICE:  2019      CHIEF COMPLAINT:  Worsening left foot wound. HISTORY OF PRESENT ILLNESS:  The patient is a 48-year-old male who was admitted to [place] [address] on  with complaints of worsening left foot wound and pain. The patient is well known to the Infectious Diseases service. He was admitted in late 2019 with similar complaints. At that time, he underwent incision and drainage of the foot wounds on 2019. Cultures grew Stenotrophomonas maltophilia, Enterococcus, and Providencia. Due to insurance issues, he remained in the hospital to complete a course of treatment with tigecycline and ceftriaxone. He reportedly was noncompliant with outpatient wound care as he was admitted to an inpatient psychiatric unit. He returns to the hospital with complaints as above. He has been restarted on tigecycline and ceftriaxone which was the antibiotic regimen he was on at the previous admission. The Infectious Diseases service has been asked to assist with antibiotic management. PAST MEDICAL HISTORY:  Unspecified aneurysm with stroke, bladder cancer, chronic pain on methadone, gout, dyslipidemia, hypertension, seizure disorder, DVT, thyroid disease, right knee arthroscopy, foot incision and drainage as above, cystoscopy with TURBT. SOCIAL HISTORY:  The patient is a current tobacco smoker. He drinks 14 cans of beer weekly. He denies illicit drug use. FAMILY HISTORY:  Diabetes mellitus. ALLERGIES:  SULFA, THIS CAUSED A RASH. CIPROFLOXACIN CAUSES HIVES. VANCOMYCIN CAUSES SHORTNESS OF BREATH. OUTPATIENT MEDICATIONS:  Please see body of chart for details. He was not on antibiotics prior to admission. REVIEW OF SYSTEMS:  As per the HPI. Remainder of 10-system review of systems is unremarkable.     LABORATORY DATA:  White blood cell count 12,800, platelet count 538,953. Creatinine is 0.71. Blood culture is pending. PHYSICAL EXAMINATION:  VITAL SIGNS:  Temperature is 97.6, maximum temperature is less than 100; heart rate 63 beats per minute, blood pressure 114/64, respiratory rate 17, oxygen saturation 96% on room air. GENERAL:  Alert, in no acute distress. HEENT:  Normocephalic, atraumatic. Mucous membranes moist.  HEART:  Regular rate and rhythm. No murmurs, gallops, rubs. PULMONARY:  Clear to auscultation anteriorly. ABDOMEN:  Soft, nontender, nondistended. EXTREMITIES:  There is an ulcer on the left lateral ankle and dorsal surface of the left foot. There is surrounding erythema. Seropurulent drainage on dressing. Foot is exquisitely tender to palpation. PSYCHIATRIC:  The patient is moderately distressed. ASSESSMENT AND PLAN:  1. Chronic left foot wound status post incision and drainage on 05/25/2019. Previous cultures grew Stenotrophomonas maltophilia, Enterococcus, and Providencia. He was treated with tigecycline and ceftriaxone at the previous admission. These antibiotics have been restarted. Unfortunately, we sent in a culture at the time of examination. We will continue current antibiotics. Blood cultures have been ordered. Podiatry has been consulted. 2.  Multiple antibiotic allergies including vancomycin, sulfa, and ciprofloxacin. 3.  History of alcohol abuse. 4.  Chronic pain. The patient is on methadone for chronic pain. He denies a history of drug abuse. 5.  History of bladder cancer. 6.  History of hemorrhagic stroke with left-sided hemiparesis. Thank you for allowing me to participate in the care of this patient.       Courtney Cleaning DO      [id number]  D:  05/30/2019 10:38  T:  05/30/2019 10:47  JOB #:  [id number]

## 2019-05-30 NOTE — PROGRESS NOTES
SAD assessment perform due to previous suicide attempt and admission. Patient's score populated at a 5 due to history. Dr. Evan Wilkerson notified. Due to no present suicide ideations or depression there is no need for further suicide precautions or suicide assessment. Will continue to monitor the patient.

## 2019-05-30 NOTE — ED NOTES
TRANSFER - OUT REPORT:    Verbal report given to Erika(name) on Rosy Clayton  being transferred to 5th floor(unit) for routine progression of care       Report consisted of patients Situation, Background, Assessment and   Recommendations(SBAR). Information from the following report(s) SBAR, ED Summary, [facility] and Recent Results was reviewed with the receiving nurse. Lines:   Peripheral IV 05/30/19 Right Antecubital (Active)   Site Assessment Clean, dry, & intact 5/30/2019 12:28 AM   Phlebitis Assessment 0 5/30/2019 12:28 AM   Infiltration Assessment 0 5/30/2019 12:28 AM   Dressing Status Clean, dry, & intact 5/30/2019 12:28 AM   Dressing Type Transparent 5/30/2019 12:28 AM   Hub Color/Line Status Pink 5/30/2019 12:28 AM   Action Taken Catheter retaped 5/30/2019 12:28 AM       Peripheral IV 05/30/19 Left;Upper Arm (Active)   Site Assessment Clean, dry, & intact 5/30/2019 12:28 AM   Phlebitis Assessment 0 5/30/2019 12:28 AM   Infiltration Assessment 0 5/30/2019 12:28 AM   Dressing Status Clean, dry, & intact 5/30/2019 12:28 AM   Dressing Type Transparent 5/30/2019 12:28 AM   Hub Color/Line Status Pink 5/30/2019 12:28 AM   Action Taken Catheter retaped 5/30/2019 12:28 AM        Opportunity for questions and clarification was provided.       Patient transported with:   Registered Nurse  Tech

## 2019-05-30 NOTE — PROGRESS NOTES
[facility]: MED RECONCILIATION    Comments/Recommendations: The patient was discharged from [facility] on 5/27/19 and readmitted on 5/29/19. The patient does not provide a medication list or pill bottles but denies changes to his PTA list since recent discharge. The patient reports taking a limited number of medications on 5/28/19 but no medications on 5/29/19. Another pharmacist verified the patient's methadone dose with morning:  Earle Regan PHARMD 5/30/2019 10:20  Called [facility] at [phone]. Spoke to Charles Aleman (Co-ordinator). Confirmed his Methadone dose = 130 mg daily. Last seen over there was on May 19 at [phone]. Since then patient has received 130 mg Methadone daily from 5/20/19 to 5/27/19 at [facility] and then [facility]. \"      Allergies: Sulfamethoxazole-trimethoprim; Ciprofloxacin; Codeine; Cymbalta [duloxetine]; Gabapentin; Lyrica [pregabalin]; Sulfa (sulfonamide antibiotics); Ultram [tramadol]; and Vancomycin    Prior to Admission Medications:     Prior to Admission Medications   Prescriptions Last Dose Informant Patient Reported? Taking?   acetaminophen (TYLENOL) 325 mg tablet  Self No Yes   Sig: Take 2 Tabs by mouth every four (4) hours as needed. Indications: Arthritic Pain   allopurinol (ZYLOPRIM) 100 mg tablet 5/28/2019  No Yes   Sig: Take 1 Tab by mouth daily. Indications: treatment to prevent acute gout attack   amLODIPine (NORVASC) 10 mg tablet 5/28/2019 Self No Yes   Sig: Take 1 Tab by mouth daily. colchicine 0.6 mg tablet  Self Yes Yes   Sig: Take 0.6 mg by mouth daily as needed (gout flare). levothyroxine (SYNTHROID) 100 mcg tablet 5/27/2019  No Yes   Sig: Take 1 Tab by mouth Daily (before breakfast). Indications: hypothyroidism   lisinopril (PRINIVIL, ZESTRIL) 20 mg tablet 5/28/2019 Self No Yes   Sig: Take 2 Tabs by mouth daily. Indications: high blood pressure   melatonin 3 mg tablet  Self No Yes   Sig: Take 1 Tab by mouth nightly as needed.    methadone (DOLOPHINE) 10 mg/mL solution 5/27/2019 Other Yes Yes   Sig: Take 130 mg by mouth daily. senna-docusate (DARELL-COLACE) 8.6-50 mg per tablet 5/27/2019 Self Yes Yes   Sig: Take 1 Tab by mouth daily. sertraline (ZOLOFT) 50 mg tablet 5/27/2019  No Yes   Sig: Take 1 Tab by mouth daily. Indications: major depressive disorder   tamsulosin (FLOMAX) 0.4 mg capsule 5/27/2019  No Yes   Sig: Take 1 Cap by mouth daily.  Indications: bladder cancer      Facility-Administered Medications: None        Thank you,      Kennedy Harrell, PharmD, BCPS

## 2019-05-30 NOTE — PROGRESS NOTES
Pt was seen and examined with Dr Juanito Sanchez. Case discussed with the admitting physician, Dr Lucinda Roque. Wound culture was taken. C/o severe pain.

## 2019-05-30 NOTE — PROGRESS NOTES
[facility]: MED RECONCILIATION    Comments/Recommendations: The patient declined interview with the pharmacist. Pharmacist will return at a later time.     Thank you,      Dmitriy Pacheco, PharmD, BCPS

## 2019-05-30 NOTE — PROGRESS NOTES
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

## 2019-05-30 NOTE — PROGRESS NOTES
Bedside and Verbal shift change report given to Brittany Patel RN (oncoming nurse) by Margie Zendejas RN (offgoing nurse). Report included the following information SBAR, Kardex, Intake/Output, MAR and Recent Results.

## 2019-05-30 NOTE — H&P
[facility]    Hospitalist Admission Note      NAME:  Rolando Atkinson   :   1965   MRN:  [id number]     PCP:  Karina Polanco MD     Date/Time:  2019 12:57 AM          Subjective:     CHIEF COMPLAINT: foot wound     HISTORY OF PRESENT ILLNESS:     Mr. Yvonne Valladares is a 46 y.o.  male who presented to the Emergency Department complaining of foot wound. He is a poor and tangential historian. Wound has worsened since his discharge from here for same issue 3 weeks ago. He spent some of that time in a psych unit for SI.  ER finds mildly elevated WBC, CRP, but he fails to meet sepsis criteria. We will admit him for management. Past Medical History:   Diagnosis Date    Aneurysm [facility])     (with stroke)    Bladder tumor     Cancer ([address]     bladder CA    Chronic pain     Family history of bladder cancer     Gout     History of vascular access device 2019    [facility] VAT 4 FR MIDLINE R Cephalic Limited access    History of vascular access device 2019    4 fr Midline right Cephalic midline access    Hypercholesterolemia     Hypertension     Lesion of bladder     Seizures ([address]     Stroke ([address] 2006    left sided weakness    Thromboembolus ([address]     Thyroid disease     TIA (transient ischemic attack)         Past Surgical History:   Procedure Laterality Date    HX ORTHOPAEDIC      R arthroscopy    HX OTHER SURGICAL      x2 L foot    HX UROLOGICAL  2018    Cystoscopy, TURBT (greater than 5 cm resected) Dr. Allyn Severs, [facility].  KNEE ARTHROSCP HARV      left knee    TRANSURETHRAL RESEC BLADDER NECK  08/10/2018       Social History     Tobacco Use    Smoking status: Current Every Day Smoker     Packs/day: 1.00    Smokeless tobacco: Never Used   Substance Use Topics    Alcohol use:  Yes     Alcohol/week: 8.4 oz     Types: 14 Cans of beer per week     Comment: occasional        Family History   Problem Relation Age of Onset    Diabetes Father     Lung Disease Father     Diabetes Sister     Diabetes Brother     Cancer Paternal Grandfather         Bladder      Allergies   Allergen Reactions    Sulfamethoxazole-Trimethoprim Rash     L eye and L hand    Ciprofloxacin Hives     Per pcp records    Codeine Hives     Tolerates dilaudid, oxycodone    Cymbalta [Duloxetine] Other (comments)     Confusion and memory loss    Gabapentin Hives and Diarrhea    Lyrica [Pregabalin] Hives    Sulfa (Sulfonamide Antibiotics) Rash    Ultram [Tramadol] Hives    Vancomycin Shortness of Breath        Prior to Admission medications    Medication Sig Start Date End Date Taking? Authorizing Provider   allopurinol (ZYLOPRIM) 100 mg tablet Take 1 Tab by mouth daily. Indications: treatment to prevent acute gout attack 5/24/19   Danita EDOUARD NP   levothyroxine (SYNTHROID) 100 mcg tablet Take 1 Tab by mouth Daily (before breakfast). Indications: hypothyroidism 5/24/19   Danita EDOUARD NP   tamsulosin (FLOMAX) 0.4 mg capsule Take 1 Cap by mouth daily. Indications: bladder cancer 5/24/19   Danita EDOUARD NP   sertraline (ZOLOFT) 50 mg tablet Take 1 Tab by mouth daily. Indications: major depressive disorder 5/23/19   Danita EDOUARD NP   senna-docusate (DARELL-COLACE) 8.6-50 mg per tablet Take 1 Tab by mouth daily. Provider, Historical   lisinopril (PRINIVIL, ZESTRIL) 20 mg tablet Take 2 Tabs by mouth daily. Indications: high blood pressure 5/10/19   Claire Lara MD   amLODIPine (NORVASC) 10 mg tablet Take 1 Tab by mouth daily. 5/11/19   Claire Lraa MD   methadone (DOLOPHINE) 10 mg/mL solution Take 130 mg by mouth daily. Provider, Historical   colchicine 0.6 mg tablet Take 0.6 mg by mouth daily as needed (gout flare). Provider, Historical   acetaminophen (TYLENOL) 325 mg tablet Take 2 Tabs by mouth every four (4) hours as needed. Indications: Arthritic Pain 10/12/18   Philipp Lloyd MD   melatonin 3 mg tablet Take 1 Tab by mouth nightly as needed. 10/12/18   Mike Segovia MD       Review of Systems:  (bold if positive, if negative)    Gen:  Eyes:  ENT:  CVS:  Pulm:  GI:  :  MS:  Pain, weakness, swelling, arthritisSkin:  erythema, abscess, woundPsych:   Insomnia, depression, anxiety,Endo:  Hem:  Renal:  Neuro:  weakness      Objective:      VITALS:    Vital signs reviewed; most recent are:    Visit Vitals  /84   Pulse 86   Temp 97.8 °F (36.6 °C)   Resp 16   Ht 6' 1\" (1.854 m)   Wt 108.9 kg (240 lb)   SpO2 98%   BMI 31.66 kg/m²     SpO2 Readings from Last 6 Encounters:   05/30/19 98%   05/27/19 95%   05/20/19 99%   05/10/19 92%   10/12/18 96%   09/28/18 95%        No intake or output data in the 24 hours ending 05/30/19 0057     Exam:     Physical Exam:    Gen:  Well-developed, well-nourished, in no acute distress  HEENT:  Pink conjunctivae, PERRL, hearing intact to voice, moist mucous membranes  Neck:  Supple, without masses, thyroid non-tender  Resp:  No accessory muscle use, clear breath sounds without wheezes rales or rhonchi  Card:  No murmurs, normal S1, S2 without thrills, bruits or peripheral edema  Abd:  Soft, non-tender, non-distended, normoactive bowel sounds are present, no mass  Lymph:  No cervical or inguinal adenopathy  Musc:  No cyanosis or clubbing  Skin:  Purulent drainage from foot wound, lateral malleolus and between toes, skin turgor is good  Neuro:  Cranial nerves are grossly intact, general LE motor weakness, follows commands   Psych:  Poor insight, oriented to person, place and time, alert     Labs:    Recent Labs     05/30/19  0003   WBC 12.8*   HGB 12.1   HCT 37.1        Recent Labs     05/30/19  0003      K 3.4*      CO2 27   GLU 97   BUN 5*   CREA 0.71   CA 8.8   ALB 4.0   TBILI 0.4   SGOT 20   ALT 35     Lab Results   Component Value Date/Time    Glucose (POC) 150 (H) 03/19/2018 11:45 AM    Glucose (POC) 123 (H) 03/18/2018 08:55 PM     No results for input(s): PH, PCO2, PO2, HCO3, FIO2 in the last 72 hours. No results for input(s): INR in the last 72 hours. No lab exists for component: INREXT  All Micro Results     Procedure Component Value Units Date/Time    CULTURE, Genna Wynne [[id number]]     Order Status:  Sent Specimen:  Wound Drainage     CULTURE, BLOOD, PAIRED [[id number]] Collected:  05/30/19 0000    Order Status:  Completed Specimen:  Blood Updated:  05/30/19 0028          I have reviewed previous records       Assessment and Plan:      Left foot infection - POA, I&D of ulcer with Bx by podiatry on 4/27. CT showed no drainable abscess or e/o osteomyelitis. Wound culture polymicrobial, including enterococcus, Providencia, and Stenotrophomonas. ID consulted and gave IV tigecycline and CTX till 5/10/19. Lack of insurance coverage to do this outside hospital, so he stayed here. OP vascular f/u with wound care and DEVEN  Boots recommended by podiatry, but he did not go as he was in inpatient psych unit. On last visit, PT/OT samiral states he will have no HH needs. I think it is possible that he intentionally, or passively, allows the wound to worsen to achieve the secondary gain of inpatient admission and narcotics. For now resume tig/CTX and consult ID and podiatry again.     HTN - Stable on lisinopril and Norvasc. IV hydralazine PRN     Chronic pain syndrome / Depression - Continue methadone. Continue sertraline and prn QHS trazodone. He was just [facility] from psych unit 2 days ago.     ETOH abuse - per report, but doubtful he had acces in [place]. Goody vitamins      Gout - No symptoms on allopurinol.     Malignant neoplasm of urinary bladder - s/p surgery in April 2018 at [facility]. Cr stable. Has missed his outpatient followup and he requested Urology to see him here last visit.   Continue flomax     Hypothyroidism - TSH markedly was elevated presumed due to non compliance last admit. Continue Synthroid at current dose for now, but repeat TFTs in 4 weeks     Hx of hemorrhagic stroke with left sided hemiparesis / Physical debility - Uses wheelchair and walker at baseline. PT/OT consulted. Continue supportive care     Chronic obstructive pulmonary disease - Stable. PRN nebs     Tobacco abuse - nicoderm       Telemetry reviewed:   normal sinus rhythm    Risk of deterioration: high      Total time spent with patient: 79 Minutes I reviewed chart, notes, data and current medications in the medical record. I have examined and treated the patient at bedside during this period.                  Care Plan discussed with: Patient, Nursing Staff and >50% of time spent in counseling and coordination of care    Discussed:  Care Plan       ___________________________________________________    Attending Physician: Rohit Rodrigues MD

## 2019-05-31 LAB
ANION GAP SERPL CALC-SCNC: 4 MMOL/L (ref 5–15)
BASOPHILS # BLD: 0.1 K/UL (ref 0–0.1)
BASOPHILS NFR BLD: 1 % (ref 0–1)
BUN SERPL-MCNC: 17 MG/DL (ref 6–20)
BUN/CREAT SERPL: 26 (ref 12–20)
CALCIUM SERPL-MCNC: 8.5 MG/DL (ref 8.5–10.1)
CHLORIDE SERPL-SCNC: 106 MMOL/L (ref 97–108)
CO2 SERPL-SCNC: 27 MMOL/L (ref 21–32)
CREAT SERPL-MCNC: 0.65 MG/DL (ref 0.7–1.3)
DIFFERENTIAL METHOD BLD: ABNORMAL
EOSINOPHIL # BLD: 0.7 K/UL (ref 0–0.4)
EOSINOPHIL NFR BLD: 6 % (ref 0–7)
ERYTHROCYTE [DISTWIDTH] IN BLOOD BY AUTOMATED COUNT: 14.1 % (ref 11.5–14.5)
GLUCOSE SERPL-MCNC: 81 MG/DL (ref 65–100)
HCT VFR BLD AUTO: 34.5 % (ref 36.6–50.3)
HGB BLD-MCNC: 11.1 G/DL (ref 12.1–17)
IMM GRANULOCYTES # BLD AUTO: 0.1 K/UL (ref 0–0.04)
IMM GRANULOCYTES NFR BLD AUTO: 1 % (ref 0–0.5)
LYMPHOCYTES # BLD: 2.2 K/UL (ref 0.8–3.5)
LYMPHOCYTES NFR BLD: 21 % (ref 12–49)
MAGNESIUM SERPL-MCNC: 2.1 MG/DL (ref 1.6–2.4)
MCH RBC QN AUTO: 28.7 PG (ref 26–34)
MCHC RBC AUTO-ENTMCNC: 32.2 G/DL (ref 30–36.5)
MCV RBC AUTO: 89.1 FL (ref 80–99)
MONOCYTES # BLD: 1.3 K/UL (ref 0–1)
MONOCYTES NFR BLD: 12 % (ref 5–13)
NEUTS SEG # BLD: 6.5 K/UL (ref 1.8–8)
NEUTS SEG NFR BLD: 59 % (ref 32–75)
NRBC # BLD: 0 K/UL (ref 0–0.01)
NRBC BLD-RTO: 0 PER 100 WBC
PLATELET # BLD AUTO: 255 K/UL (ref 150–400)
PMV BLD AUTO: 10.8 FL (ref 8.9–12.9)
POTASSIUM SERPL-SCNC: 4.1 MMOL/L (ref 3.5–5.1)
RBC # BLD AUTO: 3.87 M/UL (ref 4.1–5.7)
SODIUM SERPL-SCNC: 137 MMOL/L (ref 136–145)
WBC # BLD AUTO: 10.9 K/UL (ref 4.1–11.1)

## 2019-05-31 PROCEDURE — 80048 BASIC METABOLIC PNL TOTAL CA: CPT

## 2019-05-31 PROCEDURE — 97162 PT EVAL MOD COMPLEX 30 MIN: CPT

## 2019-05-31 PROCEDURE — 65270000029 HC RM PRIVATE

## 2019-05-31 PROCEDURE — 74011250637 HC RX REV CODE- 250/637: Performed by: INTERNAL MEDICINE

## 2019-05-31 PROCEDURE — 85025 COMPLETE CBC W/AUTO DIFF WBC: CPT

## 2019-05-31 PROCEDURE — 97116 GAIT TRAINING THERAPY: CPT

## 2019-05-31 PROCEDURE — 74011000258 HC RX REV CODE- 258: Performed by: INTERNAL MEDICINE

## 2019-05-31 PROCEDURE — 74011250636 HC RX REV CODE- 250/636: Performed by: INTERNAL MEDICINE

## 2019-05-31 PROCEDURE — 77030020186 HC BOOT HL PROTCT SAGE -B

## 2019-05-31 PROCEDURE — 36415 COLL VENOUS BLD VENIPUNCTURE: CPT

## 2019-05-31 PROCEDURE — 83735 ASSAY OF MAGNESIUM: CPT

## 2019-05-31 PROCEDURE — 77030022298 HC DRSG ANTIMIC ACTICT S&N -A

## 2019-05-31 PROCEDURE — 74011000250 HC RX REV CODE- 250: Performed by: INTERNAL MEDICINE

## 2019-05-31 RX ORDER — POLYETHYLENE GLYCOL 3350 17 G/17G
17 POWDER, FOR SOLUTION ORAL DAILY
Status: DISCONTINUED | OUTPATIENT
Start: 2019-05-31 | End: 2019-06-19 | Stop reason: HOSPADM

## 2019-05-31 RX ADMIN — LISINOPRIL 40 MG: 20 TABLET ORAL at 09:59

## 2019-05-31 RX ADMIN — COLCHICINE 0.6 MG: 0.6 TABLET, FILM COATED ORAL at 10:13

## 2019-05-31 RX ADMIN — LEVOTHYROXINE SODIUM 100 MCG: 100 TABLET ORAL at 06:09

## 2019-05-31 RX ADMIN — Medication 10 ML: at 21:03

## 2019-05-31 RX ADMIN — OXYCODONE HYDROCHLORIDE 10 MG: 5 TABLET ORAL at 01:04

## 2019-05-31 RX ADMIN — SENNOSIDES AND DOCUSATE SODIUM 1 TABLET: 8.6; 5 TABLET ORAL at 09:59

## 2019-05-31 RX ADMIN — SODIUM CHLORIDE 50 MG: 900 INJECTION, SOLUTION INTRAVENOUS at 04:01

## 2019-05-31 RX ADMIN — Medication 10 ML: at 13:11

## 2019-05-31 RX ADMIN — POLYETHYLENE GLYCOL 3350 17 G: 17 POWDER, FOR SOLUTION ORAL at 21:12

## 2019-05-31 RX ADMIN — ACETAMINOPHEN 650 MG: 325 TABLET ORAL at 16:47

## 2019-05-31 RX ADMIN — ALUMINUM HYDROXIDE, MAGNESIUM HYDROXIDE, AND SIMETHICONE 30 ML: 200; 200; 20 SUSPENSION ORAL at 04:05

## 2019-05-31 RX ADMIN — PANTOPRAZOLE SODIUM 40 MG: 40 TABLET, DELAYED RELEASE ORAL at 06:08

## 2019-05-31 RX ADMIN — Medication 10 ML: at 04:02

## 2019-05-31 RX ADMIN — SODIUM CHLORIDE 50 MG: 900 INJECTION, SOLUTION INTRAVENOUS at 13:09

## 2019-05-31 RX ADMIN — SERTRALINE HYDROCHLORIDE 50 MG: 50 TABLET ORAL at 09:59

## 2019-05-31 RX ADMIN — ACETAMINOPHEN 650 MG: 325 TABLET ORAL at 03:59

## 2019-05-31 RX ADMIN — TAMSULOSIN HYDROCHLORIDE 0.4 MG: 0.4 CAPSULE ORAL at 09:59

## 2019-05-31 RX ADMIN — ACETAMINOPHEN 650 MG: 325 TABLET ORAL at 10:11

## 2019-05-31 RX ADMIN — OXYCODONE HYDROCHLORIDE 10 MG: 5 TABLET ORAL at 13:11

## 2019-05-31 RX ADMIN — DAKIN'S SOLUTION 0.125% (QUARTER STRENGTH): 0.12 SOLUTION at 13:07

## 2019-05-31 RX ADMIN — OXYCODONE HYDROCHLORIDE 10 MG: 5 TABLET ORAL at 07:22

## 2019-05-31 RX ADMIN — METHADONE HYDROCHLORIDE 130 MG: 10 CONCENTRATE ORAL at 09:59

## 2019-05-31 RX ADMIN — CEFTRIAXONE 2 G: 2 INJECTION, POWDER, FOR SOLUTION INTRAMUSCULAR; INTRAVENOUS at 21:02

## 2019-05-31 RX ADMIN — OXYCODONE HYDROCHLORIDE 10 MG: 5 TABLET ORAL at 19:02

## 2019-05-31 RX ADMIN — AMLODIPINE BESYLATE 10 MG: 5 TABLET ORAL at 09:59

## 2019-05-31 RX ADMIN — ACETAMINOPHEN 650 MG: 325 TABLET ORAL at 21:00

## 2019-05-31 NOTE — WOUND CARE
Wound Care consult to assess the left foot wounds present on admission. Plan of care discussed with Dr Kennedi Neil and Dr Florina Stokes. Alert, no distress- Up in room with cane, history of stroke - left side weakness and hypersensitivity to pain. Recent patient of Dr Colonel Lopez podiatry and will follow up with MD as outpatient. Assessment  Dressings removed from left dorsal foot wound and left medial heel wounds- thick yellow odorous drainage moderate. Foot is edematous, erythemic and painful. Left medial heel wound- 4x4 cm full thickness wound with thick yellow adherent slough with red tissue, edges pink and moist.   Left dorsal foot wound- irregular edges ~4x3 cm full thickness- moist red with dark discoloration, moderate yellow drainage. Treatment  Wounds and foot cleansed, acticoat applied to wounds and covered with dry dressings, elevated leg, heel off loaded. Tolerated well. Wound care orders per Dr Kennedi Neil.   Will follow- reconsult if needed  [address] [place] [address]

## 2019-05-31 NOTE — PROGRESS NOTES
Problem: Pressure Injury - Risk of  Goal: *Prevention of pressure injury  Outcome: Progressing Towards Goal     Problem: Patient Education: Go to Patient Education Activity  Goal: Patient/Family Education  Outcome: Progressing Towards Goal     Problem: Falls - Risk of  Goal: *Absence of Falls  Outcome: Progressing Towards Goal     Problem: Patient Education: Go to Patient Education Activity  Goal: Patient/Family Education  Outcome: Progressing Towards Goal     Problem: Suicide/Homicide (Adult/Pediatric)  Goal: *STG: Remains safe in hospital  Outcome: Progressing Towards Goal  Goal: *STG: Seeks staff when feelings of self harm or harm towards others arise  Outcome: Progressing Towards Goal  Goal: *STG: Attends activities and groups  Outcome: Progressing Towards Goal  Goal: *STG:  Verbalizes alternative ways of dealing with maladaptive feelings/behaviors  Outcome: Progressing Towards Goal  Goal: *STG/LTG: Complies with medication therapy  Outcome: Progressing Towards Goal  Goal: *STG/LTG:  No longer expresses self destructive or suicidal/homicidal thoughts  Outcome: Progressing Towards Goal  Goal: *LTG:  Identifies available community resources  Outcome: Progressing Towards Goal  Goal: *LTG:  Develops proactive suicide prevention plan  Outcome: Progressing Towards Goal  Goal: Interventions  Outcome: Progressing Towards Goal     Problem: Patient Education: Go to Patient Education Activity  Goal: Patient/Family Education  Outcome: Progressing Towards Goal

## 2019-05-31 NOTE — PROGRESS NOTES
Bedside and Verbal shift change report given to Melissa Coronel RN (oncoming nurse) by Latrell Chawla RN (offgoing nurse). Report included the following information SBAR, Kardex, Intake/Output, MAR, Accordion, Recent Results, Med Rec Status and Cardiac Rhythm NSR.

## 2019-05-31 NOTE — PROGRESS NOTES
Bedside and Verbal shift change report given to Kayli ABREU RN (oncoming nurse) by Jeronimo Dudley (offgoing nurse). Report included the following information SBAR and Kardex.

## 2019-05-31 NOTE — PROGRESS NOTES
[facility] [address]  [id number] [facility], [address] [place] [address]  [phone]      Medical Progress Note      NAME: Lidia Acosta   :  1965  MRM:  [id number]    Date/Time: 2019  7:51 AM       Assessment and Plan:   1. Chronic left foot infection -I&D of ulcer with Bx by podiatry on . CT showed no drainable abscess or e/o osteomyelitis. Wound culture on previous admission polymicrobial, including enterococcus, Providencia, and Stenotrophomonas. Treated with IV tigecycline and CTX till 5/10/19. OP vascular f/u with wound care and DEVEN  Boots recommended by podiatry, but he did not go as he was in inpatient psych unit. On Tigecycline and CTX( multiple drug allergy). Evaluated by ID. Check wound culture. Awaiting podiatry consult      2. HTN - Stable on lisinopril and Norvasc. IV hydralazine PRN     3. Chronic pain syndrome / Depression - Continue methadone. Continue sertraline and prn QHS trazodone. He was just [facility] from psych unit 2 days ago prior to this admission.     4. ETOH abuse - per report, but doubtful he had acces in [place].       5.  Gout - No symptoms on allopurinol.     6.  Malignant neoplasm of urinary bladder - s/p surgery in 2018 at [facility]. Cr stable. Has missed his outpatient followup and he requested Urology to see him here last visit. Continue flomax     7.  Hypothyroidism - TSH markedly was elevated presumed due to non compliance last admit. Continue Synthroid at current dose for now, but repeat TFTs in 4 weeks     8. Hx of hemorrhagic stroke with left sided hemiparesis / Physical debility - Uses wheelchair and walker at baseline. PT/OT consulted. Continue supportive care     9. Chronic obstructive pulmonary disease - Stable. PRN nebs     10. Tobacco abuse - nicoderm                Subjective:     Chief Complaint:  Follow up of pt who was admitted with infected woung.  C/O pain on the LT foot     ROS:  (bold if positive, if negative)      Tolerating PT  Tolerating Diet        Objective:     Last 24hrs VS reviewed since prior progress note.  Most recent are:    Visit Vitals  /90 (BP 1 Location: Right arm, BP Patient Position: Sitting)   Pulse 66   Temp 97.8 °F (36.6 °C)   Resp 18   Ht 6' 1\" (1.854 m)   Wt 108.9 kg (240 lb)   SpO2 95%   BMI 31.66 kg/m²     SpO2 Readings from Last 6 Encounters:   05/31/19 95%   05/27/19 95%   05/20/19 99%   05/10/19 92%   10/12/18 96%   09/28/18 95%            Intake/Output Summary (Last 24 hours) at 5/31/2019 0751  Last data filed at 5/31/2019 5914  Gross per 24 hour   Intake 490 ml   Output 400 ml   Net 90 ml        Physical Exam:    Gen:  Well-developed, well-nourished, in no acute distress  HEENT:  Pink conjunctivae, PERRL, hearing intact to voice, moist mucous membranes  Neck:  Supple, without masses, thyroid non-tender  Resp:  No accessory muscle use, clear breath sounds without wheezes rales or rhonchi  Card:  No murmurs, normal S1, S2 without thrills, bruits or peripheral edema  Abd:  Soft, non-tender, non-distended, normoactive bowel sounds are present, no palpable organomegaly and no detectable hernias  Lymph:  No cervical or inguinal adenopathy  Musc:  No cyanosis or clubbing  Skin:  LT foot ulcer   Neuro:  Cranial nerves are grossly intact, LT side weakness  Psych:  Good insight, oriented to person, place and time, alert  __________________________________________________________________  Medications Reviewed: (see below)  Medications:     Current Facility-Administered Medications   Medication Dose Route Frequency    acetaminophen (TYLENOL) tablet 650 mg  650 mg Oral Q4H PRN    melatonin tablet 3 mg  3 mg Oral QHS PRN    methadone (DOLOPHINE) 10 mg/mL concentrated solution 130 mg  130 mg Oral DAILY    colchicine tablet 0.6 mg  0.6 mg Oral DAILY PRN    lisinopril (PRINIVIL, ZESTRIL) tablet 40 mg  40 mg Oral DAILY    amLODIPine (NORVASC) tablet 10 mg  10 mg Oral DAILY    senna-docusate (PERICOLACE) 8.6-50 mg per tablet 1 Tab  1 Tab Oral DAILY    sertraline (ZOLOFT) tablet 50 mg  50 mg Oral DAILY    allopurinol (ZYLOPRIM) tablet 100 mg  100 mg Oral DAILY    tamsulosin (FLOMAX) capsule 0.4 mg  0.4 mg Oral DAILY    nicotine (NICODERM CQ) 21 mg/24 hr patch 1 Patch  1 Patch TransDERmal DAILY PRN    OLANZapine (ZyPREXA) tablet 5 mg  5 mg Oral Q6H PRN    sodium chloride (NS) flush 5-40 mL  5-40 mL IntraVENous Q8H    sodium chloride (NS) flush 5-40 mL  5-40 mL IntraVENous PRN    diphenhydrAMINE (BENADRYL) capsule 25 mg  25 mg Oral Q4H PRN    ondansetron (ZOFRAN) injection 4 mg  4 mg IntraVENous Q4H PRN    enoxaparin (LOVENOX) injection 40 mg  40 mg SubCUTAneous Q24H    cefTRIAXone (ROCEPHIN) 2 g in 0.9% sodium chloride (MBP/ADV) 50 mL  2 g IntraVENous Q24H    TIGEcycline (TYGACIL) 50 mg in 0.9% sodium chloride (MBP/ADV) 100 mL  50 mg IntraVENous Q12H    pantoprazole (PROTONIX) tablet 40 mg  40 mg Oral ACB    alum-mag hydroxide-simeth (MYLANTA) oral suspension 30 mL  30 mL Oral Q4H PRN    levothyroxine (SYNTHROID) tablet 100 mcg  100 mcg Oral ACB    oxyCODONE IR (ROXICODONE) tablet 10 mg  10 mg Oral Q6H PRN        Lab Data Reviewed: (see below)  Lab Review:     Recent Labs     05/31/19  0404 05/30/19  0003   WBC 10.9 12.8*   HGB 11.1* 12.1   HCT 34.5* 37.1    280     Recent Labs     05/31/19  0404 05/30/19  0003    138   K 4.1 3.4*    103   CO2 27 27   GLU 81 97   BUN 17 5*   CREA 0.65* 0.71   CA 8.5 8.8   MG 2.1  --    ALB  --  4.0   TBILI  --  0.4   SGOT  --  20   ALT  --  35     Lab Results   Component Value Date/Time    Glucose (POC) 150 (H) 03/19/2018 11:45 AM    Glucose (POC) 123 (H) 03/18/2018 08:55 PM    Glucose (POC) 96 03/16/2018 04:58 PM    Glucose (POC) 116 (H) 12/12/2012 12:42 PM    Glucose (POC) 99 12/11/2012 11:27 PM     No results for input(s): PH, PCO2, PO2, HCO3, FIO2 in the last 72 hours.   No results for input(s): INR in the last 72 hours.    No lab exists for component: INREXT  All Micro Results     Procedure Component Value Units Date/Time    CULTURE, BLOOD, PAIRED [[id number]] Collected:  05/30/19 0000    Order Status:  Completed Specimen:  Blood Updated:  05/31/19 0640     Special Requests: NO SPECIAL REQUESTS        Culture result: NO GROWTH 1 DAY       CULTURE, Amy Les STAIN [[id number]] Collected:  05/30/19 1500    Order Status:  Completed Specimen:  Foot Updated:  05/30/19 1921     Special Requests: NO SPECIAL REQUESTS        GRAM STAIN NO WBC'S SEEN         3+ GRAM NEGATIVE RODS               2+ GRAM POSITIVE COCCOBACILLI           Culture result: PENDING    CULTURE, Sheryl New [[id number]] Collected:  05/30/19 0141    Order Status:  Completed Specimen:  Wound Drainage Updated:  05/30/19 1123     Special Requests: NO SPECIAL REQUESTS        GRAM STAIN NO WBC'S SEEN               OCCASIONAL APPARENT GRAM NEGATIVE RODS           Culture result: PENDING          I have reviewed notes of prior 24hr. Other pertinent lab:       Total time spent with patient: [address] discussed with: Patient, Nursing Staff and >50% of time spent in counseling and coordination of care    Discussed:  Care Plan    Prophylaxis:  Lovenox    Disposition:  Home w/Family           ___________________________________________________    Attending Physician: Mayra Yo MD

## 2019-05-31 NOTE — PROGRESS NOTES
[facility] Progress Note           Edinson Hsieh DO    [phone] Office  [phone]  Fax    2019      Assessment & Plan:   1. Chronic left foot wound status post incision and drainage on 2019. Previous cultures grew Stenotrophomonas maltophilia, Enterococcus, and Providencia. He was treated with tigecycline and ceftriaxone at the previous admission. These antibiotics have been restarted. Cultures pending. Continue LWC. Beronica Gonzalez 2.  Multiple antibiotic allergies including vancomycin, sulfa, and ciprofloxacin. 3.  History of alcohol abuse. 4.  Chronic pain. The patient is on methadone for chronic pain. He denies a history of drug abuse. 5.  History of bladder cancer. 6.  History of hemorrhagic stroke with left-sided hemiparesis.                Subjective:     Leg pain slightly better    Objective:     Vitals:   Visit Vitals  /82 (BP 1 Location: Right arm, BP Patient Position: At rest)   Pulse 60   Temp 97.6 °F (36.4 °C)   Resp 18   Ht 6' 1\" (1.854 m)   Wt 108.9 kg (240 lb)   SpO2 96%   BMI 31.66 kg/m²        Tmax:  Temp (24hrs), Av.8 °F (36.6 °C), Min:97.6 °F (36.4 °C), Max:98 °F (36.7 °C)      Exam:   Patient is intubated:  no    Physical Examination:   General:  Alert, cooperative, no distress   Head:  Normocephalic, atraumatic. Eyes:  Conjunctivae clear   Neck: Supple       Lungs:   No distress. Chest wall:     Heart:     Abdomen:      Extremities: Moves all. Foot dressed   Skin:  No rash   Neurologic: CNII-XII intact. Normal strength     Labs:        No lab exists for component: ITNL   No results for input(s): CPK, CKMB, TROIQ in the last 72 hours. Recent Labs     19  0404 19  0003    138   K 4.1 3.4*    103   CO2 27 27   BUN 17 5*   CREA 0.65* 0.71   GLU 81 97   MG 2.1  --    ALB  --  4.0   WBC 10.9 12.8*   HGB 11.1* 12.1   HCT 34.5* 37.1    280     No results for input(s): INR, PTP, APTT in the last 72 hours.     No lab exists for component: INREXT  Needs: urine analysis, urine sodium, protein and creatinine  No results found for: VIVEK, CREAU      Cultures:     Lab Results   Component Value Date/Time    Specimen Description: BLOOD 02/04/2010 10:55 PM    Specimen Description: BLOOD 02/12/2009 11:30 PM     Lab Results   Component Value Date/Time    Culture result: PENDING 05/30/2019 03:00 PM    Culture result: PENDING 05/30/2019 01:41 AM    Culture result: NO GROWTH 1 DAY 05/30/2019 12:00 AM       Radiology:     Medications       Current Facility-Administered Medications   Medication Dose Route Frequency Last Dose    acetaminophen (TYLENOL) tablet 650 mg  650 mg Oral Q4H  mg at 05/31/19 0359    melatonin tablet 3 mg  3 mg Oral QHS PRN      methadone (DOLOPHINE) 10 mg/mL concentrated solution 130 mg  130 mg Oral DAILY 130 mg at 05/30/19 1013    colchicine tablet 0.6 mg  0.6 mg Oral DAILY PRN      lisinopril (PRINIVIL, ZESTRIL) tablet 40 mg  40 mg Oral DAILY 40 mg at 05/30/19 1037    amLODIPine (NORVASC) tablet 10 mg  10 mg Oral DAILY 10 mg at 05/30/19 1037    senna-docusate (PERICOLACE) 8.6-50 mg per tablet 1 Tab  1 Tab Oral DAILY 1 Tab at 05/30/19 1038    sertraline (ZOLOFT) tablet 50 mg  50 mg Oral DAILY 50 mg at 05/30/19 1038    allopurinol (ZYLOPRIM) tablet 100 mg  100 mg Oral DAILY 100 mg at 05/30/19 1038    tamsulosin (FLOMAX) capsule 0.4 mg  0.4 mg Oral DAILY 0.4 mg at 05/30/19 1038    nicotine (NICODERM CQ) 21 mg/24 hr patch 1 Patch  1 Patch TransDERmal DAILY PRN      OLANZapine (ZyPREXA) tablet 5 mg  5 mg Oral Q6H PRN      sodium chloride (NS) flush 5-40 mL  5-40 mL IntraVENous Q8H 10 mL at 05/31/19 0402    sodium chloride (NS) flush 5-40 mL  5-40 mL IntraVENous PRN      diphenhydrAMINE (BENADRYL) capsule 25 mg  25 mg Oral Q4H PRN      ondansetron (ZOFRAN) injection 4 mg  4 mg IntraVENous Q4H PRN 4 mg at 05/30/19 1019    enoxaparin (LOVENOX) injection 40 mg  40 mg SubCUTAneous Q24H 40 mg at 05/30/19 5882    cefTRIAXone (ROCEPHIN) 2 g in 0.9% sodium chloride (MBP/ADV) 50 mL  2 g IntraVENous Q24H 2 g at 05/30/19 2045    TIGEcycline (TYGACIL) 50 mg in 0.9% sodium chloride (MBP/ADV) 100 mL  50 mg IntraVENous Q12H 50 mg at 05/31/19 0401    pantoprazole (PROTONIX) tablet 40 mg  40 mg Oral ACB 40 mg at 05/31/19 7116    alum-mag hydroxide-simeth (MYLANTA) oral suspension 30 mL  30 mL Oral Q4H PRN 30 mL at 05/31/19 0405    levothyroxine (SYNTHROID) tablet 100 mcg  100 mcg Oral  mcg at 05/31/19 0609    oxyCODONE IR (ROXICODONE) tablet 10 mg  10 mg Oral Q6H PRN 10 mg at 05/31/19 9133           Case discussed with:IM Delfina Diego, DO

## 2019-05-31 NOTE — PROGRESS NOTES
Problem: Mobility Impaired (Adult and Pediatric)  Goal: *Acute Goals and Plan of Care (Insert Text)  Description  Physical Therapy Goals  Initiated 5/31/2019  1. Patient will move from supine to sit and sit to supine , scoot up and down and roll side to side in bed with modified independence within 7 day(s). 2.  Patient will transfer from bed to chair and chair to bed with modified independence using the least restrictive device within 7 day(s). 3.  Patient will perform sit to stand with modified independence within 7 day(s). 4.  Patient will ambulate with modified independence for 200 feet with the least restrictive device within 7 day(s). 5.  Patient will ascend/descend 2 stairs with  handrail(s) with modified independence within 7 day(s). Outcome: Progressing Towards Goal   PHYSICAL THERAPY EVALUATION  Patient: Kelly Bah (65 y.o. male)  Date: 5/31/2019  Primary Diagnosis: Open wound, lower leg [S81.809A]        Precautions: fall       ASSESSMENT :  Based on the objective data described below, the patient presents with difficulty with ambulation. Patient lives with family with 2 steps to enter was using single point cane and has a wound on the left foot. Patient in and out of hospital multiple times. Rolled on the edge of bed modified independent, supine to sit modified independent, sit to stand modified independent, ambulate with single point cane CGA side steps towards the head of bed patient declined to ambulate more or OOB to chair patient reporting 9/10 pain on his left foot. Wound nurse asked to replace foam padding around the heel area of the post op shoe to prevent rubbing against the wound. Notified notified nurse who agreed to continue to monitor patient. Patient will benefit from skilled intervention to address the above impairments. Patient?s rehabilitation potential is considered to be Excellent  Factors which may influence rehabilitation potential include:   ?          None noted  ? Mental ability/status  ? Medical condition  ? Home/family situation and support systems  ? Safety awareness  ? Pain tolerance/management  ? Other:      PLAN :  Recommendations and Planned Interventions:  ?           Bed Mobility Training             ? Neuromuscular Re-Education  ? Transfer Training                   ? Orthotic/Prosthetic Training  ? Gait Training                         ? Modalities  ? Therapeutic Exercises           ? Edema Management/Control  ? Therapeutic Activities            ? Patient and Family Training/Education  ? Other (comment):    Frequency/Duration: Patient will be followed by physical therapy  5 times a week to address goals. Discharge Recommendations: Home Health and To Be Determined pending progress from therapy. Further Equipment Recommendations for Discharge: none     SUBJECTIVE:   Patient stated ?ok. ? OBJECTIVE DATA SUMMARY:   HISTORY:    Past Medical History:   Diagnosis Date    Aneurysm ([address]     (with stroke)    Bladder tumor     Cancer ([address]     bladder CA    Chronic pain     Family history of bladder cancer     Gout     History of vascular access device 04/25/2019    [facility] VAT 4 FR MIDLINE R Cephalic Limited access    History of vascular access device 04/26/2019    4 fr Midline right Cephalic midline access    Hypercholesterolemia     Hypertension     Lesion of bladder     Seizures ([address]     Stroke ([address] 2006    left sided weakness    Thromboembolus ([address]     Thyroid disease     TIA (transient ischemic attack) 2012     Past Surgical History:   Procedure Laterality Date    HX ORTHOPAEDIC      R arthroscopy    HX OTHER SURGICAL      x2 L foot    HX UROLOGICAL  04/20/2018    Cystoscopy, TURBT (greater than 5 cm resected) Dr. Josi Harley, [facility].     KNEE ARTHROSCP HARV      left knee    TRANSURETHRAL RESEC BLADDER NECK  08/10/2018     Prior Level of Function/Home Situation: see above note  Personal factors and/or comorbidities impacting plan of care:     Home Situation  Home Environment: Private residence  One/Two Story Residence: One story  Living Alone: Yes  Support Systems: Child(venancio)  Patient Expects to be Discharged to[de-identified] Private residence  Current DME Used/Available at Home: None    EXAMINATION/PRESENTATION/DECISION MAKING:   Critical Behavior:              Hearing: Auditory  Auditory Impairment: None    Range Of Motion:  AROM: Within functional limits           PROM: Within functional limits           Strength:    Strength: Within functional limits                    Tone & Sensation:                                  Coordination:  Coordination: Within functional limits  Vision:      Functional Mobility:  Bed Mobility:  Rolling: Modified independent  Supine to Sit: Modified independent  Sit to Supine: Modified independent  Scooting: Modified independent  Transfers:  Sit to Stand: Modified independent  Stand to Sit: Modified independent  Stand Pivot Transfers: Contact guard assistance     Bed to Chair: (declined just want to sit on the edge of bed)              Balance:   Sitting: Intact  Standing: Impaired; With support  Standing - Static: Fair  Standing - Dynamic : Fair  Ambulation/Gait Training:  Distance (ft): 2 Feet (ft)  Assistive Device: Gait belt;Cane, straight  Ambulation - Level of Assistance: Minimal assistance     Gait Description (WDL): Exceptions to WDL  Gait Abnormalities: Antalgic; Path deviations        Base of Support: Widened                            Therapeutic Exercises:    Instructed patient to continue active range of motion exercise on both legs while up on chair or on bed.       Functional Measure:  Barthel Index:    Bathin  Bladder: 10  Bowels: 10  Groomin  Dressing: 10  Feeding: 10  Mobility: 0  Stairs: 0  Toilet Use: 5  Transfer (Bed to Chair and Back): 10  Total: 65/100       Percentage of impairment   0%   1-19% 20-39%   40-59%   60-79%   80-99%   100%   Barthel Score 0-100 100 99-80 79-60 59-40 20-39 1-19   0     The Barthel ADL Index: Guidelines  1. The index should be used as a record of what a patient does, not as a record of what a patient could do. 2. The main aim is to establish degree of independence from any help, physical or verbal, however minor and for whatever reason. 3. The need for supervision renders the patient not independent. 4. A patient's performance should be established using the best available evidence. Asking the patient, friends/relatives and nurses are the usual sources, but direct observation and common sense are also important. However direct testing is not needed. 5. Usually the patient's performance over the preceding 24-48 hours is important, but occasionally longer periods will be relevant. 6. Middle categories imply that the patient supplies over 50 per cent of the effort. 7. Use of aids to be independent is allowed. Osmany Hebert., Barthel, D.W. (1985). Functional evaluation: the Barthel Index. [facility] (14)2. ISAI Cowan, Vivian Infante., [facility], [address] [place] [address] (1999). Measuring the change indisability after inpatient rehabilitation; comparison of the responsiveness of the Barthel Index and Functional [place] Measure. Journal of Neurology, Neurosurgery, and Psychiatry, 66(4), 347-076. Gerardo Rasmussen, N.J.A, TRACI Hines.TAQUERIA, & Ana Luisa Kitchen M.A. (2004.) Assessment of post-stroke quality of life in cost-effectiveness studies: The usefulness of the Barthel Index and the EuroQoL-5D.  Quality of Life Research, 15, 038-16           Physical Therapy Evaluation Charge Determination   History Examination Presentation Decision-Making   LOW Complexity : Zero comorbidities / personal factors that will impact the outcome / POC LOW Complexity : 1-2 Standardized tests and measures addressing body structure, function, activity limitation and / or participation in recreation  LOW Complexity : Stable, uncomplicated  Other outcome measures barthel  LOW       Based on the above components, the patient evaluation is determined to be of the following complexity level: LOW     Pain:  Pain Scale 1: Numeric (0 - 10)  Pain Intensity 1: 4  Pain Location 1: Foot  Pain Orientation 1: Left  Pain Description 1: Aching;Constant  Pain Intervention(s) 1: Medication (see MAR)  Activity Tolerance:   Good. Please refer to the flowsheet for vital signs taken during this treatment. After treatment:   ?         Patient left in no apparent distress sitting up in chair  ? Patient left in no apparent distress in bed  ? Call bell left within reach  ? Nursing notified  ? Caregiver present  ? Bed alarm activated    COMMUNICATION/EDUCATION:   The patient?s plan of care was discussed with: Registered Nurse and patient . ? Fall prevention education was provided and the patient/caregiver indicated understanding. ? Patient/family have participated as able in goal setting and plan of care. ?         Patient/family agree to work toward stated goals and plan of care. ?         Patient understands intent and goals of therapy, but is neutral about his/her participation. ? Patient is unable to participate in goal setting and plan of care. Thank you for this referral.  Kvng Lovelace, PT,WCC.    Time Calculation: 27 mins

## 2019-05-31 NOTE — CONSULTS
Patient seen but refused evaluation and management of wounds. Has been non compliant and combative throughout course due to chronic pain. Spoke to Dr. Terrie Dove about greater saphenous intervention for insufficiency and reflux. Will need outpatient vascular testing. Non-operative compression alone will not heal chronic wounds. Recommend management per unna boot compression via wound care and vascular. Signing off from podiatric surgery standpoint. Radha Cunningham.  Pica, DPM

## 2019-06-01 PROCEDURE — 74011250637 HC RX REV CODE- 250/637: Performed by: INTERNAL MEDICINE

## 2019-06-01 PROCEDURE — 65270000029 HC RM PRIVATE

## 2019-06-01 PROCEDURE — 74011250636 HC RX REV CODE- 250/636: Performed by: INTERNAL MEDICINE

## 2019-06-01 PROCEDURE — 74011000258 HC RX REV CODE- 258: Performed by: INTERNAL MEDICINE

## 2019-06-01 RX ADMIN — ACETAMINOPHEN 650 MG: 325 TABLET ORAL at 17:14

## 2019-06-01 RX ADMIN — Medication 10 ML: at 13:08

## 2019-06-01 RX ADMIN — ACETAMINOPHEN 650 MG: 325 TABLET ORAL at 03:17

## 2019-06-01 RX ADMIN — METHADONE HYDROCHLORIDE 130 MG: 10 CONCENTRATE ORAL at 09:30

## 2019-06-01 RX ADMIN — SODIUM CHLORIDE 50 MG: 900 INJECTION, SOLUTION INTRAVENOUS at 03:10

## 2019-06-01 RX ADMIN — SERTRALINE HYDROCHLORIDE 50 MG: 50 TABLET ORAL at 09:21

## 2019-06-01 RX ADMIN — OXYCODONE HYDROCHLORIDE 10 MG: 5 TABLET ORAL at 07:23

## 2019-06-01 RX ADMIN — TAMSULOSIN HYDROCHLORIDE 0.4 MG: 0.4 CAPSULE ORAL at 09:21

## 2019-06-01 RX ADMIN — LISINOPRIL 40 MG: 20 TABLET ORAL at 09:21

## 2019-06-01 RX ADMIN — LEVOTHYROXINE SODIUM 100 MCG: 100 TABLET ORAL at 05:58

## 2019-06-01 RX ADMIN — ACETAMINOPHEN 650 MG: 325 TABLET ORAL at 10:32

## 2019-06-01 RX ADMIN — SENNOSIDES AND DOCUSATE SODIUM 1 TABLET: 8.6; 5 TABLET ORAL at 09:21

## 2019-06-01 RX ADMIN — ALLOPURINOL 100 MG: 100 TABLET ORAL at 09:21

## 2019-06-01 RX ADMIN — COLCHICINE 0.6 MG: 0.6 TABLET, FILM COATED ORAL at 09:21

## 2019-06-01 RX ADMIN — OXYCODONE HYDROCHLORIDE 10 MG: 5 TABLET ORAL at 13:08

## 2019-06-01 RX ADMIN — SODIUM CHLORIDE 50 MG: 900 INJECTION, SOLUTION INTRAVENOUS at 13:08

## 2019-06-01 RX ADMIN — Medication 10 ML: at 20:25

## 2019-06-01 RX ADMIN — CEFTRIAXONE 2 G: 2 INJECTION, POWDER, FOR SOLUTION INTRAMUSCULAR; INTRAVENOUS at 20:24

## 2019-06-01 RX ADMIN — OXYCODONE HYDROCHLORIDE 10 MG: 5 TABLET ORAL at 18:58

## 2019-06-01 RX ADMIN — PANTOPRAZOLE SODIUM 40 MG: 40 TABLET, DELAYED RELEASE ORAL at 07:23

## 2019-06-01 RX ADMIN — AMLODIPINE BESYLATE 10 MG: 5 TABLET ORAL at 09:21

## 2019-06-01 RX ADMIN — OXYCODONE HYDROCHLORIDE 10 MG: 5 TABLET ORAL at 01:05

## 2019-06-01 RX ADMIN — ENOXAPARIN SODIUM 40 MG: 40 INJECTION SUBCUTANEOUS at 05:56

## 2019-06-01 RX ADMIN — POLYETHYLENE GLYCOL 3350 17 G: 17 POWDER, FOR SOLUTION ORAL at 09:21

## 2019-06-01 NOTE — PROGRESS NOTES
Bedside and Verbal shift change report given to Melissa Coronel RN (oncoming nurse) by Domitila Desir RN (offgoing nurse). Report included the following information SBAR, Kardex, Intake/Output, MAR, Accordion, Recent Results and Med Rec Status.

## 2019-06-01 NOTE — PROGRESS NOTES
Gigi Marino [address]  [address] [facility], [place], [address] [facility]  [phone]      Medical Progress Note      NAME: Rolando Atkinson   :  1965  MRM:  [id number]    Date/Time: 2019  7:51 AM       Assessment and Plan:   1. Chronic left foot infection -I&D of ulcer with Bx by podiatry on . CT showed no drainable abscess or e/o osteomyelitis. Wound culture on previous admission polymicrobial, including enterococcus, Providencia, and Stenotrophomonas. Treated with IV tigecycline and CTX till 5/10/19. OP vascular f/u with wound care and DEVEN  Boots recommended by podiatry, but he did not go as he was in inpatient psych unit. On Tigecycline and CTX( multiple drug allergy). Evaluated by ID. Wound culture: multi organisms. Awaiting sensitivity. Evaluated by podiatry and no plan for intervention. Seen by vascular and recommended outpatient FU for further testing      2. HTN - Stable on lisinopril and Norvasc. IV hydralazine PRN     3. Chronic pain syndrome / Depression - Continue methadone. Continue sertraline and prn QHS trazodone. He was just [facility] from psych unit 2 days ago prior to this admission.     4. ETOH abuse - per report, but doubtful he had acces in [place].       5.  Gout - No symptoms on allopurinol.     6.  Malignant neoplasm of urinary bladder - s/p surgery in 2018 at [facility]. Cr stable. Has missed his outpatient followup and he requested Urology to see him here last visit. Continue flomax     7.  Hypothyroidism - TSH markedly was elevated presumed due to non compliance last admit. Continue Synthroid at current dose for now, but repeat TFTs in 4 weeks     8. Hx of hemorrhagic stroke with left sided hemiparesis / Physical debility - Uses wheelchair and walker at baseline. PT/OT consulted. Continue supportive care     9. Chronic obstructive pulmonary disease - Stable. PRN nebs     10.   Tobacco abuse - nicoderm                Subjective:     Chief Complaint:  Follow up of pt who was admitted with infected woung. C/O pain on the LT foot     ROS:  (bold if positive, if negative)      Tolerating PT  Tolerating Diet        Objective:     Last 24hrs VS reviewed since prior progress note.  Most recent are:    Visit Vitals  BP (!) 162/93 (BP Patient Position: Sitting)   Pulse (!) 59   Temp 98.4 °F (36.9 °C)   Resp 19   Ht 6' 1\" (1.854 m)   Wt 108.9 kg (240 lb)   SpO2 92%   BMI 31.66 kg/m²     SpO2 Readings from Last 6 Encounters:   06/01/19 92%   05/27/19 95%   05/20/19 99%   05/10/19 92%   10/12/18 96%   09/28/18 95%            Intake/Output Summary (Last 24 hours) at 6/1/2019 1046  Last data filed at 5/31/2019 2118  Gross per 24 hour   Intake --   Output 1400 ml   Net -1400 ml        Physical Exam:    Gen:  Well-developed, well-nourished, in no acute distress  HEENT:  Pink conjunctivae, PERRL, hearing intact to voice, moist mucous membranes  Neck:  Supple, without masses, thyroid non-tender  Resp:  No accessory muscle use, clear breath sounds without wheezes rales or rhonchi  Card:  No murmurs, normal S1, S2 without thrills, bruits or peripheral edema  Abd:  Soft, non-tender, non-distended, normoactive bowel sounds are present, no palpable organomegaly and no detectable hernias  Lymph:  No cervical or inguinal adenopathy  Musc:  No cyanosis or clubbing  Skin:  LT foot ulcer   Neuro:  Cranial nerves are grossly intact, LT side weakness  Psych:  Good insight, oriented to person, place and time, alert  __________________________________________________________________  Medications Reviewed: (see below)  Medications:     Current Facility-Administered Medications   Medication Dose Route Frequency    sodium hypochlorite (QUARTER STRENGTH DAKIN'S) 0.125% irrigation (bottle)   Topical EVERY OTHER DAY    polyethylene glycol (MIRALAX) packet 17 g  17 g Oral DAILY    acetaminophen (TYLENOL) tablet 650 mg  650 mg Oral Q4H PRN    melatonin tablet 3 mg  3 mg Oral QHS PRN    methadone (DOLOPHINE) 10 mg/mL concentrated solution 130 mg  130 mg Oral DAILY    colchicine tablet 0.6 mg  0.6 mg Oral DAILY PRN    lisinopril (PRINIVIL, ZESTRIL) tablet 40 mg  40 mg Oral DAILY    amLODIPine (NORVASC) tablet 10 mg  10 mg Oral DAILY    senna-docusate (PERICOLACE) 8.6-50 mg per tablet 1 Tab  1 Tab Oral DAILY    sertraline (ZOLOFT) tablet 50 mg  50 mg Oral DAILY    allopurinol (ZYLOPRIM) tablet 100 mg  100 mg Oral DAILY    tamsulosin (FLOMAX) capsule 0.4 mg  0.4 mg Oral DAILY    nicotine (NICODERM CQ) 21 mg/24 hr patch 1 Patch  1 Patch TransDERmal DAILY PRN    OLANZapine (ZyPREXA) tablet 5 mg  5 mg Oral Q6H PRN    sodium chloride (NS) flush 5-40 mL  5-40 mL IntraVENous Q8H    sodium chloride (NS) flush 5-40 mL  5-40 mL IntraVENous PRN    diphenhydrAMINE (BENADRYL) capsule 25 mg  25 mg Oral Q4H PRN    ondansetron (ZOFRAN) injection 4 mg  4 mg IntraVENous Q4H PRN    enoxaparin (LOVENOX) injection 40 mg  40 mg SubCUTAneous Q24H    cefTRIAXone (ROCEPHIN) 2 g in 0.9% sodium chloride (MBP/ADV) 50 mL  2 g IntraVENous Q24H    TIGEcycline (TYGACIL) 50 mg in 0.9% sodium chloride (MBP/ADV) 100 mL  50 mg IntraVENous Q12H    pantoprazole (PROTONIX) tablet 40 mg  40 mg Oral ACB    alum-mag hydroxide-simeth (MYLANTA) oral suspension 30 mL  30 mL Oral Q4H PRN    levothyroxine (SYNTHROID) tablet 100 mcg  100 mcg Oral ACB    oxyCODONE IR (ROXICODONE) tablet 10 mg  10 mg Oral Q6H PRN        Lab Data Reviewed: (see below)  Lab Review:     Recent Labs     05/31/19  0404 05/30/19  0003   WBC 10.9 12.8*   HGB 11.1* 12.1   HCT 34.5* 37.1    280     Recent Labs     05/31/19  0404 05/30/19  0003    138   K 4.1 3.4*    103   CO2 27 27   GLU 81 97   BUN 17 5*   CREA 0.65* 0.71   CA 8.5 8.8   MG 2.1  --    ALB  --  4.0   TBILI  --  0.4   SGOT  --  20   ALT  --  35     Lab Results   Component Value Date/Time    Glucose (POC) 150 (H) 03/19/2018 11:45 AM    Glucose (POC) 123 (H) 03/18/2018 08:55 PM    Glucose (POC) 96 03/16/2018 04:58 PM    Glucose (POC) 116 (H) 12/12/2012 12:42 PM    Glucose (POC) 99 12/11/2012 11:27 PM     No results for input(s): PH, PCO2, PO2, HCO3, FIO2 in the last 72 hours. No results for input(s): INR in the last 72 hours. No lab exists for component: INREXT, INREXT  All Micro Results     Procedure Component Value Units Date/Time    CULTURE, BLOOD, PAIRED [[id number]] Collected:  05/30/19 0000    Order Status:  Completed Specimen:  Blood Updated:  06/01/19 0531     Special Requests: NO SPECIAL REQUESTS        Culture result: NO GROWTH 2 DAYS       CULTURE, WOUND Claudy Congo STAIN [[id number]]  (Abnormal) Collected:  05/30/19 1500    Order Status:  Completed Specimen:  Foot Updated:  05/31/19 1321     Special Requests: NO SPECIAL REQUESTS        GRAM STAIN NO WBC'S SEEN         3+ GRAM NEGATIVE RODS               2+ GRAM POSITIVE COCCOBACILLI           Culture result: HEAVY DIPHTHEROIDS         LIGHT GRAM NEGATIVE RODS       CULTURE, Mima Camel STAIN [[id number]]  (Abnormal) Collected:  05/30/19 0141    Order Status:  Completed Specimen:  Wound Drainage Updated:  05/31/19 1104     Special Requests: NO SPECIAL REQUESTS        GRAM STAIN NO WBC'S SEEN               OCCASIONAL APPARENT GRAM NEGATIVE RODS           Culture result:       LIGHT PROBABLE PROTEUS SPECIES                  LIGHT POSSIBLE 2ND GRAM NEGATIVE SARAH            HEAVY DIPHTHEROIDS             I have reviewed notes of prior 24hr. Other pertinent lab:       Total time spent with patient: [address] discussed with: Patient, Nursing Staff and >50% of time spent in counseling and coordination of care    Discussed:  Care Plan    Prophylaxis:  Lovenox    Disposition:  Home w/Family           ___________________________________________________    Attending Physician: Josiane Wilson MD

## 2019-06-01 NOTE — PROGRESS NOTES
Bedside and Verbal shift change report given to EMERY Byers (oncoming nurse) by Blanco Rogers (offgoing nurse). Report included the following information SBAR and Kardex.

## 2019-06-01 NOTE — PROGRESS NOTES
Bedside shift change report given to Kayli (oncoming nurse) by Leobardo Desir (offgoing nurse). Report included the following information SBAR, Kardex, Procedure Summary, Intake/Output and MAR. RN provided prn oxycodone to pt for a pain level of 10.

## 2019-06-01 NOTE — PROGRESS NOTES
Spiritual Care Partner Volunteer visited patient in 5 Med Surg on 6/1/19.   Documented by: Jahaira Gama., MS., [address] [facility] ([id number])

## 2019-06-01 NOTE — PROGRESS NOTES
Spiritual Care Partner Volunteer visited patient in 5 Med Surg on 6/1/19.   Documented by: Douglas Ochoa., MS., [address] ([id number])

## 2019-06-02 LAB
BACTERIA SPEC CULT: ABNORMAL
GRAM STN SPEC: ABNORMAL
GRAM STN SPEC: ABNORMAL
SERVICE CMNT-IMP: ABNORMAL

## 2019-06-02 PROCEDURE — 65270000029 HC RM PRIVATE

## 2019-06-02 PROCEDURE — 74011250637 HC RX REV CODE- 250/637: Performed by: INTERNAL MEDICINE

## 2019-06-02 PROCEDURE — 77030022298 HC DRSG ANTIMIC ACTICT S&N -A

## 2019-06-02 PROCEDURE — 74011250636 HC RX REV CODE- 250/636: Performed by: INTERNAL MEDICINE

## 2019-06-02 PROCEDURE — 74011000258 HC RX REV CODE- 258: Performed by: INTERNAL MEDICINE

## 2019-06-02 RX ADMIN — PANTOPRAZOLE SODIUM 40 MG: 40 TABLET, DELAYED RELEASE ORAL at 05:46

## 2019-06-02 RX ADMIN — METHADONE HYDROCHLORIDE 130 MG: 10 CONCENTRATE ORAL at 09:00

## 2019-06-02 RX ADMIN — OXYCODONE HYDROCHLORIDE 10 MG: 5 TABLET ORAL at 19:40

## 2019-06-02 RX ADMIN — Medication 10 ML: at 04:19

## 2019-06-02 RX ADMIN — COLCHICINE 0.6 MG: 0.6 TABLET, FILM COATED ORAL at 09:00

## 2019-06-02 RX ADMIN — Medication 10 ML: at 13:10

## 2019-06-02 RX ADMIN — ENOXAPARIN SODIUM 40 MG: 40 INJECTION SUBCUTANEOUS at 04:20

## 2019-06-02 RX ADMIN — DAKIN'S SOLUTION 0.125% (QUARTER STRENGTH): 0.12 SOLUTION at 13:12

## 2019-06-02 RX ADMIN — OXYCODONE HYDROCHLORIDE 10 MG: 5 TABLET ORAL at 07:32

## 2019-06-02 RX ADMIN — ACETAMINOPHEN 650 MG: 325 TABLET ORAL at 20:58

## 2019-06-02 RX ADMIN — ALLOPURINOL 100 MG: 100 TABLET ORAL at 09:00

## 2019-06-02 RX ADMIN — OXYCODONE HYDROCHLORIDE 10 MG: 5 TABLET ORAL at 13:10

## 2019-06-02 RX ADMIN — SENNOSIDES AND DOCUSATE SODIUM 1 TABLET: 8.6; 5 TABLET ORAL at 09:00

## 2019-06-02 RX ADMIN — SODIUM CHLORIDE 50 MG: 900 INJECTION, SOLUTION INTRAVENOUS at 01:35

## 2019-06-02 RX ADMIN — ACETAMINOPHEN 650 MG: 325 TABLET ORAL at 05:46

## 2019-06-02 RX ADMIN — SODIUM CHLORIDE 50 MG: 900 INJECTION, SOLUTION INTRAVENOUS at 13:10

## 2019-06-02 RX ADMIN — AMLODIPINE BESYLATE 10 MG: 5 TABLET ORAL at 09:00

## 2019-06-02 RX ADMIN — LEVOTHYROXINE SODIUM 100 MCG: 100 TABLET ORAL at 05:46

## 2019-06-02 RX ADMIN — CEFTRIAXONE 2 G: 2 INJECTION, POWDER, FOR SOLUTION INTRAMUSCULAR; INTRAVENOUS at 20:59

## 2019-06-02 RX ADMIN — LISINOPRIL 40 MG: 20 TABLET ORAL at 09:00

## 2019-06-02 RX ADMIN — OXYCODONE HYDROCHLORIDE 10 MG: 5 TABLET ORAL at 01:34

## 2019-06-02 RX ADMIN — Medication 10 ML: at 21:00

## 2019-06-02 RX ADMIN — POLYETHYLENE GLYCOL 3350 17 G: 17 POWDER, FOR SOLUTION ORAL at 09:00

## 2019-06-02 RX ADMIN — SERTRALINE HYDROCHLORIDE 50 MG: 50 TABLET ORAL at 09:00

## 2019-06-02 RX ADMIN — TAMSULOSIN HYDROCHLORIDE 0.4 MG: 0.4 CAPSULE ORAL at 09:00

## 2019-06-02 NOTE — PROGRESS NOTES
Pt wants to take his pain medication before going to bathroom with left foot prevalon boot on that pt refusing to remove before ambulation for  Safety/fall preventions informed pt to removed first the prevalon boot he cannot walk  those boot onand not safe/high risk of fall pt has left sided weakness  from previous stroke. Pt refused educations/instructions. Tech in the room. 0220 Pt insisting to have midline iv access to prevent from sticking him for lab draws informed pt to notify MD in am during rounds.

## 2019-06-02 NOTE — DISCHARGE SUMMARY
Physician Interim Summary   Patient ID:  Thedmarl Edith  [id number]  46 y.o.  1965    PCP: Sulma Penny MD     Consults: ID, Vascular Surgery and podiatry    Covering dates: 5/29/2019 through 6/2/2019    Admission Diagnoses: Open wound, lower leg [facility]    Hospital Course:   Mr. Kamilah Lambert is a 46 y.o.  male who was admitted with foot wound. He was admitted for similar about a month ago and was discharged on ABx. He spent some of that time in a psych unit for suicide ideation. His previous and now wound culture results showed polymicrobial. On previos admission, he was treated with tigecycline and CTX, which were continued on this admission. 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 Please see daily progress note for detail. Additional hospital course and discharge summary will be done by discharging physician.

## 2019-06-02 NOTE — PROGRESS NOTES
Bedside and Verbal shift change report given to Kayli ABREU RN (oncoming nurse) by Camila Salinas (offgoing nurse). Report included the following information SBAR and Kardex.

## 2019-06-02 NOTE — PROGRESS NOTES
Educated patient about the insulin lispro that was being administered. Discussed that he would need to cover after discharge on his own while taken the current medications. Patient stated \"I cannot give myself injections because I am deathly afraid of needles. The one person that I trust to give me the shots [place] of [place]\". Instructed patient to discuss the concerns with the doctor when the doctor came up to talk to him about the insulin. Clarified the importance of the insulin.

## 2019-06-02 NOTE — PROGRESS NOTES
Bedside and Verbal shift change report given to Ronald Johnston RN (oncoming nurse) by Tiffany Ledesma RN (offgoing nurse). Report included the following information SBAR, Kardex, Procedure Summary, Intake/Output, MAR, Accordion, Recent Results, Med Rec Status and Cardiac Rhythm NSR.

## 2019-06-02 NOTE — PROGRESS NOTES
Problem: Pressure Injury - Risk of  Goal: *Prevention of pressure injury  Outcome: Progressing Towards Goal     Problem: Patient Education: Go to Patient Education Activity  Goal: Patient/Family Education  Outcome: Progressing Towards Goal     Problem: Falls - Risk of  Goal: *Absence of Falls  Outcome: Progressing Towards Goal     Problem: Patient Education: Go to Patient Education Activity  Goal: Patient/Family Education  Outcome: Progressing Towards Goal

## 2019-06-02 NOTE — PROGRESS NOTES
Gigi Marino [facility] 79  [address] [facility], [place], [address] [place] [address]  [phone]      Medical Progress Note      NAME: Sixto Yadav   :  1965  MRM:  [id number]    Date/Time: 2019  7:51 AM       Assessment and Plan:   1. Chronic left foot infection -I&D of ulcer with Bx by podiatry on . CT showed no drainable abscess or e/o osteomyelitis. Wound culture on previous admission polymicrobial, including enterococcus, Providencia, and Stenotrophomonas. Treated with IV tigecycline and CTX till 5/10/19. OP vascular f/u with wound care and DEVEN  Boots recommended by podiatry, but he did not go as he was in inpatient psych unit. On Tigecycline and CTX( multiple drug allergy). Evaluated by ID and awaiting ABx recommendation for outpatient treatment. Wound culture: multi organisms. Evaluated by podiatry and no plan for intervention. Seen by vascular and recommended outpatient FU for further testing      2. HTN - Stable on lisinopril and Norvasc. IV hydralazine PRN     3. Chronic pain syndrome / Depression - Continue methadone. Continue sertraline and prn QHS trazodone. He was just [facility] from psych unit 2 days ago prior to this admission.     4. ETOH abuse - per report, but doubtful he had acces [place] [facility] [place].       5.  Gout - No symptoms on allopurinol.     6.  Malignant neoplasm of urinary bladder - s/p surgery in 2018 at [facility]. Cr stable. Has missed his outpatient followup and he requested Urology to see him here last visit. Continue flomax     7.  Hypothyroidism - TSH markedly was elevated presumed due to non compliance last admit. Continue Synthroid at current dose for now, but repeat TFTs in 4 weeks     8. Hx of hemorrhagic stroke with left sided hemiparesis / Physical debility - Uses wheelchair and walker at baseline. PT/OT consulted. Continue supportive care     9. Chronic obstructive pulmonary disease - Stable. PRN nebs     10.   Tobacco abuse - nicoderm Subjective:     Chief Complaint:  Follow up of pt who was admitted with infected woung. C/O pain on the LT foot     ROS:  (bold if positive, if negative)      Tolerating PT  Tolerating Diet        Objective:     Last 24hrs VS reviewed since prior progress note.  Most recent are:    Visit Vitals  /70 (BP 1 Location: Right arm, BP Patient Position: At rest)   Pulse 77   Temp 98 °F (36.7 °C)   Resp 18   Ht 6' 1\" (1.854 m)   Wt 108.9 kg (240 lb)   SpO2 94%   BMI 31.66 kg/m²     SpO2 Readings from Last 6 Encounters:   06/02/19 94%   05/27/19 95%   05/20/19 99%   05/10/19 92%   10/12/18 96%   09/28/18 95%            Intake/Output Summary (Last 24 hours) at 6/2/2019 8820  Last data filed at 6/2/2019 5263  Gross per 24 hour   Intake 170 ml   Output --   Net 170 ml        Physical Exam:    Gen:  Well-developed, well-nourished, in no acute distress  HEENT:  Pink conjunctivae, PERRL, hearing intact to voice, moist mucous membranes  Neck:  Supple, without masses, thyroid non-tender  Resp:  No accessory muscle use, clear breath sounds without wheezes rales or rhonchi  Card:  No murmurs, normal S1, S2 without thrills, bruits or peripheral edema  Abd:  Soft, non-tender, non-distended, normoactive bowel sounds are present, no palpable organomegaly and no detectable hernias  Lymph:  No cervical or inguinal adenopathy  Musc:  No cyanosis or clubbing  Skin:  LT foot ulcer   Neuro:  Cranial nerves are grossly intact, LT side weakness  Psych:  Good insight, oriented to person, place and time, alert  __________________________________________________________________  Medications Reviewed: (see below)  Medications:     Current Facility-Administered Medications   Medication Dose Route Frequency    sodium hypochlorite (QUARTER STRENGTH DAKIN'S) 0.125% irrigation (bottle)   Topical EVERY OTHER DAY    polyethylene glycol (MIRALAX) packet 17 g  17 g Oral DAILY    acetaminophen (TYLENOL) tablet 650 mg  650 mg Oral Q4H PRN    melatonin tablet 3 mg  3 mg Oral QHS PRN    methadone (DOLOPHINE) 10 mg/mL concentrated solution 130 mg  130 mg Oral DAILY    colchicine tablet 0.6 mg  0.6 mg Oral DAILY PRN    lisinopril (PRINIVIL, ZESTRIL) tablet 40 mg  40 mg Oral DAILY    amLODIPine (NORVASC) tablet 10 mg  10 mg Oral DAILY    senna-docusate (PERICOLACE) 8.6-50 mg per tablet 1 Tab  1 Tab Oral DAILY    sertraline (ZOLOFT) tablet 50 mg  50 mg Oral DAILY    allopurinol (ZYLOPRIM) tablet 100 mg  100 mg Oral DAILY    tamsulosin (FLOMAX) capsule 0.4 mg  0.4 mg Oral DAILY    nicotine (NICODERM CQ) 21 mg/24 hr patch 1 Patch  1 Patch TransDERmal DAILY PRN    OLANZapine (ZyPREXA) tablet 5 mg  5 mg Oral Q6H PRN    sodium chloride (NS) flush 5-40 mL  5-40 mL IntraVENous Q8H    sodium chloride (NS) flush 5-40 mL  5-40 mL IntraVENous PRN    diphenhydrAMINE (BENADRYL) capsule 25 mg  25 mg Oral Q4H PRN    ondansetron (ZOFRAN) injection 4 mg  4 mg IntraVENous Q4H PRN    enoxaparin (LOVENOX) injection 40 mg  40 mg SubCUTAneous Q24H    cefTRIAXone (ROCEPHIN) 2 g in 0.9% sodium chloride (MBP/ADV) 50 mL  2 g IntraVENous Q24H    TIGEcycline (TYGACIL) 50 mg in 0.9% sodium chloride (MBP/ADV) 100 mL  50 mg IntraVENous Q12H    pantoprazole (PROTONIX) tablet 40 mg  40 mg Oral ACB    alum-mag hydroxide-simeth (MYLANTA) oral suspension 30 mL  30 mL Oral Q4H PRN    levothyroxine (SYNTHROID) tablet 100 mcg  100 mcg Oral ACB    oxyCODONE IR (ROXICODONE) tablet 10 mg  10 mg Oral Q6H PRN        Lab Data Reviewed: (see below)  Lab Review:     Recent Labs     05/31/19  0404   WBC 10.9   HGB 11.1*   HCT 34.5*        Recent Labs     05/31/19  0404      K 4.1      CO2 27   GLU 81   BUN 17   CREA 0.65*   CA 8.5   MG 2.1     Lab Results   Component Value Date/Time    Glucose (POC) 150 (H) 03/19/2018 11:45 AM    Glucose (POC) 123 (H) 03/18/2018 08:55 PM    Glucose (POC) 96 03/16/2018 04:58 PM    Glucose (POC) 116 (H) 12/12/2012 12:42 PM    Glucose (POC) 99 12/11/2012 11:27 PM     No results for input(s): PH, PCO2, PO2, HCO3, FIO2 in the last 72 hours. No results for input(s): INR in the last 72 hours. No lab exists for component: INREXT, INREXT  All Micro Results     Procedure Component Value Units Date/Time    CULTURE, Exie Hannon STAIN [[id number]]  (Abnormal)  (Susceptibility) Collected:  05/30/19 1500    Order Status:  Completed Specimen:  Foot Updated:  06/02/19 0926     Special Requests: NO SPECIAL REQUESTS        GRAM STAIN NO WBC'S SEEN         3+ GRAM NEGATIVE RODS               2+ GRAM POSITIVE COCCOBACILLI           Culture result: HEAVY DIPHTHEROIDS               LIGHT STENOTROPHOMONAS (Selestine Sequin.) MALTOPHILIA CLSI GUIDELINES DO NOT RECOMMEND REPORTING SUSCEPTIBILITIES FOR S. MALTOPHILIA. TRIMETHOPRIM/SULFAMETHOXAZOLE IS THE DRUG OF CHOICE. FEW PROBABLE PROTEUS SPECIES            FEW KLEBSIELLA PNEUMONIAE       CULTURE, Exie Hannon STAIN [[id number]]  (Abnormal)  (Susceptibility) Collected:  05/30/19 0141    Order Status:  Completed Specimen:  Wound Drainage Updated:  06/02/19 0920     Special Requests: NO SPECIAL REQUESTS        GRAM STAIN NO WBC'S SEEN               OCCASIONAL APPARENT GRAM NEGATIVE RODS           Culture result: LIGHT PROTEUS MIRABILIS               LIGHT STENOTROPHOMONAS (Selestine Sequin.) MALTOPHILIA CLSI GUIDELINES DO NOT RECOMMEND REPORTING SUSCEPTIBILITIES FOR S. MALTOPHILIA. TRIMETHOPRIM/SULFAMETHOXAZOLE IS THE DRUG OF CHOICE. HEAVY MIXED SKIN YULIA ISOLATED          CULTURE, BLOOD, PAIRED [[id number]] Collected:  05/30/19 0000    Order Status:  Completed Specimen:  Blood Updated:  06/02/19 0605     Special Requests: NO SPECIAL REQUESTS        Culture result: NO GROWTH 3 DAYS             I have reviewed notes of prior 24hr. Other pertinent lab:       Total time spent with patient: [place] discussed with: Patient, Nursing Staff and >50% of time spent in counseling and coordination of care    Discussed:  Care Plan    Prophylaxis:  Lovenox    Disposition:  Home w/Family           ___________________________________________________    Attending Physician: Fredi Olivas MD

## 2019-06-03 LAB
BACTERIA SPEC CULT: ABNORMAL
GRAM STN SPEC: ABNORMAL
SERVICE CMNT-IMP: ABNORMAL

## 2019-06-03 PROCEDURE — 74011250637 HC RX REV CODE- 250/637: Performed by: INTERNAL MEDICINE

## 2019-06-03 PROCEDURE — 74011000258 HC RX REV CODE- 258: Performed by: INTERNAL MEDICINE

## 2019-06-03 PROCEDURE — 65270000029 HC RM PRIVATE

## 2019-06-03 PROCEDURE — 74011250636 HC RX REV CODE- 250/636: Performed by: INTERNAL MEDICINE

## 2019-06-03 RX ADMIN — CEFTRIAXONE 2 G: 2 INJECTION, POWDER, FOR SOLUTION INTRAMUSCULAR; INTRAVENOUS at 21:05

## 2019-06-03 RX ADMIN — LISINOPRIL 40 MG: 20 TABLET ORAL at 09:07

## 2019-06-03 RX ADMIN — OXYCODONE HYDROCHLORIDE 10 MG: 5 TABLET ORAL at 13:13

## 2019-06-03 RX ADMIN — SERTRALINE HYDROCHLORIDE 50 MG: 50 TABLET ORAL at 09:07

## 2019-06-03 RX ADMIN — Medication 10 ML: at 13:11

## 2019-06-03 RX ADMIN — ENOXAPARIN SODIUM 40 MG: 40 INJECTION SUBCUTANEOUS at 03:48

## 2019-06-03 RX ADMIN — TAMSULOSIN HYDROCHLORIDE 0.4 MG: 0.4 CAPSULE ORAL at 09:07

## 2019-06-03 RX ADMIN — OXYCODONE HYDROCHLORIDE 10 MG: 5 TABLET ORAL at 01:19

## 2019-06-03 RX ADMIN — ACETAMINOPHEN 650 MG: 325 TABLET ORAL at 03:47

## 2019-06-03 RX ADMIN — COLCHICINE 0.6 MG: 0.6 TABLET, FILM COATED ORAL at 10:34

## 2019-06-03 RX ADMIN — POLYETHYLENE GLYCOL 3350 17 G: 17 POWDER, FOR SOLUTION ORAL at 09:07

## 2019-06-03 RX ADMIN — LEVOTHYROXINE SODIUM 100 MCG: 100 TABLET ORAL at 06:47

## 2019-06-03 RX ADMIN — METHADONE HYDROCHLORIDE 130 MG: 10 CONCENTRATE ORAL at 09:08

## 2019-06-03 RX ADMIN — ONDANSETRON 4 MG: 2 INJECTION INTRAMUSCULAR; INTRAVENOUS at 06:53

## 2019-06-03 RX ADMIN — SODIUM CHLORIDE 50 MG: 900 INJECTION, SOLUTION INTRAVENOUS at 01:20

## 2019-06-03 RX ADMIN — PANTOPRAZOLE SODIUM 40 MG: 40 TABLET, DELAYED RELEASE ORAL at 06:47

## 2019-06-03 RX ADMIN — OXYCODONE HYDROCHLORIDE 10 MG: 5 TABLET ORAL at 06:48

## 2019-06-03 RX ADMIN — SODIUM CHLORIDE 50 MG: 900 INJECTION, SOLUTION INTRAVENOUS at 13:46

## 2019-06-03 RX ADMIN — Medication 10 ML: at 21:07

## 2019-06-03 RX ADMIN — OXYCODONE HYDROCHLORIDE 10 MG: 5 TABLET ORAL at 19:13

## 2019-06-03 RX ADMIN — AMLODIPINE BESYLATE 10 MG: 5 TABLET ORAL at 09:07

## 2019-06-03 RX ADMIN — SENNOSIDES AND DOCUSATE SODIUM 1 TABLET: 8.6; 5 TABLET ORAL at 09:07

## 2019-06-03 RX ADMIN — ACETAMINOPHEN 650 MG: 325 TABLET ORAL at 22:20

## 2019-06-03 RX ADMIN — ACETAMINOPHEN 650 MG: 325 TABLET ORAL at 18:17

## 2019-06-03 NOTE — PROGRESS NOTES
[facility] Progress Note           Edinson Hsihe DO    [phone] Office  [phone]  Fax    6/3/2019      Assessment & Plan:   1. Chronic left foot wound status post incision and drainage on 2019. Previous cultures grew Stenotrophomonas maltophilia, Enterococcus, and Providencia. Repeat wound cultures growing SMALT, proteus, and klebsiella. Currently on tigecycline and ceftriaxone. Unfortunately due to patients allergies there are no PO abx options other than omadacycline (new tetracycline abx) which insurance will not cover due to cost. Per podiatry \"patient has been non compliant and combative throughout course due to chronic pain. Spoke to Dr. Slime Vuong about greater saphenous intervention for insufficiency and reflux. Will need outpatient vascular testing. Non-operative compression alone will not heal chronic wounds. Recommend management per unna boot compression via wound care and vascular. Signing off from podiatric surgery standpoint\"  Patient will need appropriate wound care plan in anticipation of discharge. Will d/w Dr Hal Plaza  2. Multiple antibiotic allergies including vancomycin, sulfa, and ciprofloxacin. 3. History of alcohol abuse. 4. Chronic pain. The patient is on methadone for chronic pain. He denies a history of drug abuse. 5. History of bladder cancer. 6. History of hemorrhagic stroke with left-sided hemiparesis.                Subjective:     Leg pain slightly better    Objective:     Vitals:   Visit Vitals  /52 (BP 1 Location: Right arm, BP Patient Position: At rest)   Pulse (!) 58   Temp 97.5 °F (36.4 °C)   Resp 20   Ht 6' 1\" (1.854 m)   Wt 108.9 kg (240 lb)   SpO2 97%   BMI 31.66 kg/m²        Tmax:  Temp (24hrs), Av.1 °F (36.7 °C), Min:97.5 °F (36.4 °C), Max:99.2 °F (37.3 °C)      Exam:   Patient is intubated:  no    Physical Examination:   General:  Alert, cooperative, no distress   Head:  Normocephalic, atraumatic.    Eyes:  Conjunctivae clear   Neck: Supple       Lungs:   No distress. Chest wall:     Heart:     Abdomen:      Extremities: Moves all. Foot dressed   Skin:  No rash   Neurologic: CNII-XII intact. Normal strength     Labs:        No lab exists for component: ITNL   No results for input(s): CPK, CKMB, TROIQ in the last 72 hours. No results for input(s): NA, K, CL, CO2, BUN, CREA, GLU, PHOS, MG, ALB, WBC, HGB, HCT, PLT, HGBEXT, HCTEXT, PLTEXT, HGBEXT, HCTEXT, PLTEXT in the last 72 hours. No lab exists for component:  CA  No results for input(s): INR, PTP, APTT in the last 72 hours. No lab exists for component: INREXT, INREXT  Needs: urine analysis, urine sodium, protein and creatinine  No results found for: VIVEK, CREAU      Cultures:     Lab Results   Component Value Date/Time    Specimen Description: BLOOD 02/04/2010 10:55 PM    Specimen Description: BLOOD 02/12/2009 11:30 PM     Lab Results   Component Value Date/Time    Culture result: HEAVY DIPHTHEROIDS (A) 05/30/2019 03:00 PM    Culture result: (A) 05/30/2019 03:00 PM     LIGHT STENOTROPHOMONAS ([address]) MALTOPHILIA CLSI GUIDELINES DO NOT RECOMMEND REPORTING SUSCEPTIBILITIES FOR S. MALTOPHILIA. TRIMETHOPRIM/SULFAMETHOXAZOLE IS THE DRUG OF CHOICE.     Culture result: FEW PROTEUS MIRABILIS (A) 05/30/2019 03:00 PM    Culture result: FEW KLEBSIELLA PNEUMONIAE (A) 05/30/2019 03:00 PM       Radiology:     Medications       Current Facility-Administered Medications   Medication Dose Route Frequency Last Dose    sodium hypochlorite (QUARTER STRENGTH DAKIN'S) 0.125% irrigation (bottle)   Topical EVERY OTHER DAY      polyethylene glycol (MIRALAX) packet 17 g  17 g Oral DAILY 17 g at 06/03/19 0907    acetaminophen (TYLENOL) tablet 650 mg  650 mg Oral Q4H  mg at 06/03/19 0347    melatonin tablet 3 mg  3 mg Oral QHS PRN      methadone (DOLOPHINE) 10 mg/mL concentrated solution 130 mg  130 mg Oral DAILY 130 mg at 06/03/19 0908    colchicine tablet 0.6 mg  0.6 mg Oral DAILY PRN 0.6 mg at 06/03/19 1034    lisinopril (PRINIVIL, ZESTRIL) tablet 40 mg  40 mg Oral DAILY 40 mg at 06/03/19 0907    amLODIPine (NORVASC) tablet 10 mg  10 mg Oral DAILY 10 mg at 06/03/19 0907    senna-docusate (PERICOLACE) 8.6-50 mg per tablet 1 Tab  1 Tab Oral DAILY 1 Tab at 06/03/19 1884    sertraline (ZOLOFT) tablet 50 mg  50 mg Oral DAILY 50 mg at 06/03/19 0907    allopurinol (ZYLOPRIM) tablet 100 mg  100 mg Oral DAILY 100 mg at 06/02/19 0900    tamsulosin (FLOMAX) capsule 0.4 mg  0.4 mg Oral DAILY 0.4 mg at 06/03/19 0907    nicotine (NICODERM CQ) 21 mg/24 hr patch 1 Patch  1 Patch TransDERmal DAILY PRN      OLANZapine (ZyPREXA) tablet 5 mg  5 mg Oral Q6H PRN      sodium chloride (NS) flush 5-40 mL  5-40 mL IntraVENous Q8H 10 mL at 06/02/19 2100    sodium chloride (NS) flush 5-40 mL  5-40 mL IntraVENous PRN 10 mL at 06/02/19 0419    diphenhydrAMINE (BENADRYL) capsule 25 mg  25 mg Oral Q4H PRN      ondansetron (ZOFRAN) injection 4 mg  4 mg IntraVENous Q4H PRN 4 mg at 06/03/19 0653    enoxaparin (LOVENOX) injection 40 mg  40 mg SubCUTAneous Q24H 40 mg at 06/03/19 0348    cefTRIAXone (ROCEPHIN) 2 g in 0.9% sodium chloride (MBP/ADV) 50 mL  2 g IntraVENous Q24H 2 g at 06/02/19 2059    TIGEcycline (TYGACIL) 50 mg in 0.9% sodium chloride (MBP/ADV) 100 mL  50 mg IntraVENous Q12H 50 mg at 06/03/19 0120    pantoprazole (PROTONIX) tablet 40 mg  40 mg Oral ACB 40 mg at 06/03/19 0647    alum-mag hydroxide-simeth (MYLANTA) oral suspension 30 mL  30 mL Oral Q4H PRN 30 mL at 05/31/19 0405    levothyroxine (SYNTHROID) tablet 100 mcg  100 mcg Oral  mcg at 06/03/19 0647    oxyCODONE IR (ROXICODONE) tablet 10 mg  10 mg Oral Q6H PRN 10 mg at 06/03/19 6641           Case discussed with:NILDA Vega DO

## 2019-06-03 NOTE — PROGRESS NOTES
Gigi Marino [facility] [place] [facility]  [address] [facility], [place], [address] [place] [address]  [phone]      Medical Progress Note      NAME: Agustin Guillen   :  1965  MRM:  [id number]    Date/Time: 6/3/2019  7:51 AM       Assessment and Plan:   1. Chronic left foot infection -I&D of ulcer with Bx by podiatry on . CT showed no drainable abscess or e/o osteomyelitis. Wound culture on previous admission polymicrobial, including enterococcus, Providencia, and Stenotrophomonas. Treated with IV tigecycline and CTX till 5/10/19. OP vascular f/u with wound care and DEVEN  Boots recommended by podiatry, but he did not go as he was in inpatient psych unit. On Tigecycline and CTX( multiple drug allergy). Evaluated by ID and awaiting ABx recommendation for outpatient treatment. Wound culture: multi organisms. Evaluated by podiatry and no plan for intervention. Seen by vascular and recommended outpatient FU for further testing (wants to follow-up with Dr. Luisa Edmonds)     2. HTN - Stable on lisinopril and Norvasc. IV hydralazine PRN     3. Chronic pain syndrome / Depression - Continue methadone. Continue sertraline and prn QHS trazodone. He was just [facility] from psych unit 2 days ago prior to this admission.     4. ETOH abuse - per report, but doubtful he had acces in [facility] [place].       5.  Gout - No symptoms on allopurinol. Has PRN colchicine     6. Malignant neoplasm of urinary bladder - s/p surgery in 2018 at [facility]. Cr stable. Has missed his outpatient followup and he requested Urology to see him here last visit. Continue flomax     7.  Hypothyroidism - TSH markedly was elevated presumed due to non compliance last admit. Continue Synthroid at current dose for now, but repeat TFTs in 4 weeks     8. Hx of hemorrhagic stroke with left sided hemiparesis / Physical debility - Uses wheelchair and walker at baseline. PT/OT consulted. Continue supportive care     9.   Chronic obstructive pulmonary disease - Stable. PRN nebs     10. Tobacco abuse - nicoderm    11. Non-compliance. I have strongly reinforced the need for continuity of care as the patient has fractured care throughout [place] and surrounding [facility] because \"no one can tell me whats going on. \"                Subjective:     Chief Complaint:  Follow up of pt who was admitted with infected woung. C/O pain on the LT foot     ROS:  (bold if positive, if negative)      Tolerating PT  Tolerating Diet        Objective:     Last 24hrs VS reviewed since prior progress note.  Most recent are:    Visit Vitals  /76 (BP 1 Location: Right arm, BP Patient Position: At rest)   Pulse 66   Temp 99.2 °F (37.3 °C)   Resp 18   Ht 6' 1\" (1.854 m)   Wt 108.9 kg (240 lb)   SpO2 98%   BMI 31.66 kg/m²     SpO2 Readings from Last 6 Encounters:   06/03/19 98%   05/27/19 95%   05/20/19 99%   05/10/19 92%   10/12/18 96%   09/28/18 95%            Intake/Output Summary (Last 24 hours) at 6/3/2019 1024  Last data filed at 6/3/2019 6419  Gross per 24 hour   Intake 480 ml   Output 600 ml   Net -120 ml        Physical Exam:    Gen:  Well-developed, well-nourished, in no acute distress  HEENT:  Pink conjunctivae, PERRL, hearing intact to voice, moist mucous membranes  Neck:  Supple, without masses, thyroid non-tender  Resp:  No accessory muscle use, clear breath sounds without wheezes rales or rhonchi  Card:  No murmurs, normal S1, S2 without thrills, bruits or peripheral edema  Abd:  Soft, non-tender, non-distended, normoactive bowel sounds are present, no palpable organomegaly and no detectable hernias  Lymph:  No cervical or inguinal adenopathy  Musc:  No cyanosis or clubbing  Skin:  LT foot ulcer   Neuro:  Cranial nerves are grossly intact, LT side weakness  Psych:  Good insight, oriented to person, place and time, alert  __________________________________________________________________  Medications Reviewed: (see below)  Medications:     Current Facility-Administered Medications Medication Dose Route Frequency    sodium hypochlorite (QUARTER STRENGTH DAKIN'S) 0.125% irrigation (bottle)   Topical EVERY OTHER DAY    polyethylene glycol (MIRALAX) packet 17 g  17 g Oral DAILY    acetaminophen (TYLENOL) tablet 650 mg  650 mg Oral Q4H PRN    melatonin tablet 3 mg  3 mg Oral QHS PRN    methadone (DOLOPHINE) 10 mg/mL concentrated solution 130 mg  130 mg Oral DAILY    colchicine tablet 0.6 mg  0.6 mg Oral DAILY PRN    lisinopril (PRINIVIL, ZESTRIL) tablet 40 mg  40 mg Oral DAILY    amLODIPine (NORVASC) tablet 10 mg  10 mg Oral DAILY    senna-docusate (PERICOLACE) 8.6-50 mg per tablet 1 Tab  1 Tab Oral DAILY    sertraline (ZOLOFT) tablet 50 mg  50 mg Oral DAILY    allopurinol (ZYLOPRIM) tablet 100 mg  100 mg Oral DAILY    tamsulosin (FLOMAX) capsule 0.4 mg  0.4 mg Oral DAILY    nicotine (NICODERM CQ) 21 mg/24 hr patch 1 Patch  1 Patch TransDERmal DAILY PRN    OLANZapine (ZyPREXA) tablet 5 mg  5 mg Oral Q6H PRN    sodium chloride (NS) flush 5-40 mL  5-40 mL IntraVENous Q8H    sodium chloride (NS) flush 5-40 mL  5-40 mL IntraVENous PRN    diphenhydrAMINE (BENADRYL) capsule 25 mg  25 mg Oral Q4H PRN    ondansetron (ZOFRAN) injection 4 mg  4 mg IntraVENous Q4H PRN    enoxaparin (LOVENOX) injection 40 mg  40 mg SubCUTAneous Q24H    cefTRIAXone (ROCEPHIN) 2 g in 0.9% sodium chloride (MBP/ADV) 50 mL  2 g IntraVENous Q24H    TIGEcycline (TYGACIL) 50 mg in 0.9% sodium chloride (MBP/ADV) 100 mL  50 mg IntraVENous Q12H    pantoprazole (PROTONIX) tablet 40 mg  40 mg Oral ACB    alum-mag hydroxide-simeth (MYLANTA) oral suspension 30 mL  30 mL Oral Q4H PRN    levothyroxine (SYNTHROID) tablet 100 mcg  100 mcg Oral ACB    oxyCODONE IR (ROXICODONE) tablet 10 mg  10 mg Oral Q6H PRN        Lab Data Reviewed: (see below)  Lab Review:     No results for input(s): WBC, HGB, HCT, PLT, HGBEXT, HCTEXT, PLTEXT, HGBEXT, HCTEXT, PLTEXT in the last 72 hours.   No results for input(s): NA, K, CL, CO2, GLU, BUN, CREA, CA, MG, PHOS, ALB, TBIL, TBILI, SGOT, ALT, INR in the last 72 hours. No lab exists for component: INREXT, INREXT  Lab Results   Component Value Date/Time    Glucose (POC) 150 (H) 03/19/2018 11:45 AM    Glucose (POC) 123 (H) 03/18/2018 08:55 PM    Glucose (POC) 96 03/16/2018 04:58 PM    Glucose (POC) 116 (H) 12/12/2012 12:42 PM    Glucose (POC) 99 12/11/2012 11:27 PM     No results for input(s): PH, PCO2, PO2, HCO3, FIO2 in the last 72 hours. No results for input(s): INR in the last 72 hours. No lab exists for component: INREXT, INREXT  All Micro Results     Procedure Component Value Units Date/Time    [facility][id number][facility]  (Abnormal)  (Susceptibility) Collected:  05/30/19 1500    Order Status:  Completed Specimen:  Foot Updated:  06/03/19 0959     Special Requests: NO SPECIAL REQUESTS        GRAM STAIN NO WBC'S SEEN         3+ GRAM NEGATIVE RODS               2+ GRAM POSITIVE COCCOBACILLI           Culture result: HEAVY DIPHTHEROIDS               LIGHT STENOTROPHOMONAS (Pedro Saran.) MALTOPHILIA CLSI GUIDELINES DO NOT RECOMMEND REPORTING SUSCEPTIBILITIES FOR S. MALTOPHILIA. TRIMETHOPRIM/SULFAMETHOXAZOLE IS THE DRUG OF CHOICE.             FEW PROTEUS MIRABILIS         FEW KLEBSIELLA PNEUMONIAE       CULTURE, BLOOD, PAIRED [[id number]] Collected:  05/30/19 0000    Order Status:  Completed Specimen:  Blood Updated:  06/03/19 0649     Special Requests: NO SPECIAL REQUESTS        Culture result: NO GROWTH 4 DAYS       CULTURE, WOUND Minnette Hoe STAIN [[id number]]  (Abnormal)  (Susceptibility) Collected:  05/30/19 0141    Order Status:  Completed Specimen:  Wound Drainage Updated:  06/02/19 0920     Special Requests: NO SPECIAL REQUESTS        GRAM STAIN NO WBC'S SEEN               OCCASIONAL APPARENT GRAM NEGATIVE RODS           Culture result: LIGHT PROTEUS MIRABILIS               LIGHT STENOTROPHOMONAS (Pedro Saran.) MALTOPHILIA CLSI GUIDELINES DO NOT RECOMMEND REPORTING SUSCEPTIBILITIES FOR S. MALTOPHILIA. TRIMETHOPRIM/SULFAMETHOXAZOLE IS THE DRUG OF CHOICE. HEAVY MIXED SKIN YULIA ISOLATED                I have reviewed notes of prior 24hr. Other pertinent lab:       Total time spent with patient: 40 min                  Care Plan discussed with: Patient, Nursing Staff and >50% of time spent in counseling and coordination of care    Discussed:  Care Plan    Prophylaxis:  Lovenox    Disposition:  Home w/Family with [place]           ___________________________________________________    Attending Physician: Loki Najera, DO

## 2019-06-03 NOTE — PROGRESS NOTES
PHYSICAL THERAPY:Pt declined PT after much encouragement. Pt reports  being told that he should be NWB on left foot very concerned about his situation. Pt expressed not feeling safe to go home. PT will continue to follow.

## 2019-06-03 NOTE — ROUTINE PROCESS
Bedside and Verbal shift change report given to Kaz Gordon RN (oncoming nurse) by Nancy Peace RN (offgoing nurse). Report included the following information SBAR, Kardex, Intake/Output, MAR, Accordion and Recent Results.

## 2019-06-03 NOTE — PROGRESS NOTES
Bedside and Verbal shift change report given to Arin Taylor (oncoming nurse) by Dayanara Odonnell (offgoing nurse). Report included the following information SBAR, Kardex, ED Summary and MAR.

## 2019-06-03 NOTE — PROGRESS NOTES
Reason for Readmission:   Left foot infection           RRAT Score and Risk Level:   22 high      Level of Readmission:    high      Care Conference scheduled:   None at this time. Resources/supports as identified by patient/family:   Pt reports that he has supportive daughters and was currently renting a room out of friend's house. Top Challenges facing patient (as identified by patient/family and CM): Finances/Medication cost?   No issues at this time. Pt has Medicaid insurance. Transportation    Pt has a caregiver that could possibly provide transport or children. Support system or lack thereof? No issues at this time. Living arrangements? Pt is currently renting out a room at his friend's house. Self-care/ADLs/Cognition? Pt is independent in managing ADLs Pt does not drive. Current Advanced Directive/Advance Care Plan:  Not on file at this time. Plan for utilizing home health: Pt has had [facility] previously but could not recall the name. Likelihood of additional readmission:   high             Transition of Care Plan:    Based on readmission, the patient's previous Plan of Care   has been evaluated and/or modified. The current Transition of Care Plan is:     Pt is a 47 yo male readmit for left foot infection. Pt was recently admitted   4/23/19 for foot infection and discharged on 5/10 to brother in [facility] home. Per chart review, Pt was admitted to [facility] on 5/20/19 for suicidal ideation and discharged on 5/27/19. Pt reported that after that discharge he was renting a room from a friend. Pt reported that he does not anticipate going back to his friend's residence. Pt also reported that he does not feel that he would be safe to return home under his current condition. Pt reports to have a caregiver, Livier Hernandez: [phone] since his stroke and aneurism in 2006. Pt stated that he would be open to rehab if recommended. .Care Management Interventions  PCP Verified by CM: Yes  Transition of Care Consult (CM Consult): Discharge Planning  Physical Therapy Consult: Yes  Occupational Therapy Consult: Yes  Current Support Network:  Other  Plan discussed with Pt/Family/Caregiver: Yes  Discharge Location  Discharge Placement: Unable to determine at this time      Trell Clark, [address] [facility], [address] [place] [address]

## 2019-06-04 LAB
BACTERIA SPEC CULT: NORMAL
SERVICE CMNT-IMP: NORMAL

## 2019-06-04 PROCEDURE — 74011250637 HC RX REV CODE- 250/637: Performed by: INTERNAL MEDICINE

## 2019-06-04 PROCEDURE — 74011000258 HC RX REV CODE- 258: Performed by: INTERNAL MEDICINE

## 2019-06-04 PROCEDURE — 65270000029 HC RM PRIVATE

## 2019-06-04 PROCEDURE — 74011250636 HC RX REV CODE- 250/636: Performed by: INTERNAL MEDICINE

## 2019-06-04 RX ORDER — OXYCODONE HYDROCHLORIDE 5 MG/1
15 TABLET ORAL ONCE
Status: COMPLETED | OUTPATIENT
Start: 2019-06-04 | End: 2019-06-04

## 2019-06-04 RX ADMIN — Medication 10 ML: at 13:43

## 2019-06-04 RX ADMIN — LEVOTHYROXINE SODIUM 100 MCG: 100 TABLET ORAL at 06:31

## 2019-06-04 RX ADMIN — METHADONE HYDROCHLORIDE 130 MG: 10 CONCENTRATE ORAL at 10:05

## 2019-06-04 RX ADMIN — AMLODIPINE BESYLATE 10 MG: 5 TABLET ORAL at 08:28

## 2019-06-04 RX ADMIN — Medication 10 ML: at 06:31

## 2019-06-04 RX ADMIN — SODIUM CHLORIDE 50 MG: 900 INJECTION, SOLUTION INTRAVENOUS at 02:04

## 2019-06-04 RX ADMIN — DAKIN'S SOLUTION 0.125% (QUARTER STRENGTH): 0.12 SOLUTION at 13:43

## 2019-06-04 RX ADMIN — CEFTRIAXONE 2 G: 2 INJECTION, POWDER, FOR SOLUTION INTRAMUSCULAR; INTRAVENOUS at 21:54

## 2019-06-04 RX ADMIN — OXYCODONE HYDROCHLORIDE 10 MG: 5 TABLET ORAL at 08:28

## 2019-06-04 RX ADMIN — SERTRALINE HYDROCHLORIDE 50 MG: 50 TABLET ORAL at 08:28

## 2019-06-04 RX ADMIN — SENNOSIDES AND DOCUSATE SODIUM 1 TABLET: 8.6; 5 TABLET ORAL at 08:29

## 2019-06-04 RX ADMIN — SODIUM CHLORIDE 50 MG: 900 INJECTION, SOLUTION INTRAVENOUS at 13:42

## 2019-06-04 RX ADMIN — OXYCODONE HYDROCHLORIDE 10 MG: 5 TABLET ORAL at 14:51

## 2019-06-04 RX ADMIN — LISINOPRIL 40 MG: 20 TABLET ORAL at 09:03

## 2019-06-04 RX ADMIN — TAMSULOSIN HYDROCHLORIDE 0.4 MG: 0.4 CAPSULE ORAL at 08:29

## 2019-06-04 RX ADMIN — OXYCODONE HYDROCHLORIDE 10 MG: 5 TABLET ORAL at 01:14

## 2019-06-04 RX ADMIN — PANTOPRAZOLE SODIUM 40 MG: 40 TABLET, DELAYED RELEASE ORAL at 06:31

## 2019-06-04 RX ADMIN — Medication 10 ML: at 21:55

## 2019-06-04 RX ADMIN — OXYCODONE HYDROCHLORIDE 15 MG: 5 TABLET ORAL at 03:31

## 2019-06-04 NOTE — PROGRESS NOTES
Bedside and Verbal shift change report given to Dave Coronado RN (oncoming nurse) by Trent Balderrama RN (offgoing nurse). Report included the following information SBAR, Kardex, MAR and Accordion.

## 2019-06-04 NOTE — PROGRESS NOTES
Nutrition Assessment:    RECOMMENDATIONS/INTERVENTION(S):   1. Continue with Regular diet order. 2. Will order Ten and Gelatein BID for additional 40gm protein/day. 3. Monitor Po intakes of meals/ ONS, protein intake, weight, BM's. ASSESSMENT:   6/4: 45 yo male admitted for open wound. Consult received for increased protein needs. PMhx: CVA, bladder CA, gout, HTN, hypercholesterolemia, seizures, ETOH abuse, chronic pain and methadone use. Class I obese per BMI. Weight has increased by 15lbs over last year per wt hx in EMR. Regular diet ordered. Pt reports very good appetite here and PTA. Intakes recorded as 75-90%. Pt has questions regarding his protein intake and how to \"increase his protein levels\". Educated pt on what foods contain protein and discussed supplement options for additional protein. Pt interested in protein supplements that do not contain a lot of calories because he does not want to keep gaining weight. Willing to try Ten and [address] Left foot ulcer s/p I &D on 5/25. Discussed different types of fiber and what foods they are found in to help with regular BM's. Pt states he takes chronic pain meds and therefore has to take medicine to help him have BM's but now he is c/o of his stools being too soft. Labs and meds reviewed. Diet Order: Regular  % Eaten:    Patient Vitals for the past 72 hrs:   % Diet Eaten   06/03/19 0838 90 %       Pertinent Medications: [x] Reviewed    Labs: [x] Reviewed    Anthropometrics: Height: 6' 1\" (185.4 cm) Weight: 108.9 kg (240 lb)    IBW (%IBW):   ( ) UBW (%UBW):   (  %)      BMI: Body mass index is 31.66 kg/m². This BMI is indicative of:   [] Underweight    [] Normal    [] Overweight    [x]  Class I Obesity    []  Extreme Obesity (BMI>40)  Estimated Nutrition Needs (Based on): 5047 Kcals/day(BMR 1982 x AF 1.3) , 109 g(109-120gm (1-1.1gm/kg/d)) Protein  Carbohydrate:  At Least 130 g/day  Fluids: 2576 mL/day (1 ml/kcal)    Last BM: 6/2   [x]Active     []Hyperactive  []Hypoactive       [] Absent   BS  Skin:    [] Intact   [] Incision  [x] Breakdown - left foot ulcer  [] DTI   [] Tears/Excoriation/Abrasion  []Edema [] Other: Wt Readings from Last 30 Encounters:   05/29/19 108.9 kg (240 lb)   05/20/19 108.9 kg (240 lb)   04/25/19 108.9 kg (240 lb)   04/24/19 108.9 kg (240 lb)   09/28/18 103.4 kg (228 lb)   09/28/18 103.4 kg (228 lb)   09/05/18 101.2 kg (223 lb)   08/08/18 101.2 kg (223 lb)   07/31/18 104.3 kg (230 lb)   06/05/18 102.3 kg (225 lb 8.5 oz)   06/04/18 104.3 kg (230 lb)   03/13/18 100.4 kg (221 lb 5.5 oz)   03/14/18 100.2 kg (221 lb)   03/06/18 106.1 kg (234 lb)   12/21/17 94.3 kg (208 lb)   08/13/17 94.3 kg (208 lb)   01/27/17 99.2 kg (218 lb 9.6 oz)   01/06/17 99.3 kg (219 lb)   12/13/16 105.1 kg (231 lb 12.8 oz)   10/14/16 102 kg (224 lb 13.9 oz)   09/10/16 108.4 kg (239 lb)   06/02/16 108.5 kg (239 lb 4 oz)   11/09/15 101.6 kg (224 lb)   02/12/13 109.8 kg (242 lb)   02/07/13 108.9 kg (240 lb)   01/30/13 104.3 kg (230 lb)   12/12/12 104.5 kg (230 lb 6.4 oz)   08/30/12 105.2 kg (232 lb)   04/10/12 107 kg (236 lb)   01/25/12 107 kg (236 lb)      NUTRITION DIAGNOSES:   Problem:  Increased nutrient needs     Etiology: related to increased demand for protein     Signs/Symptoms: as evidenced by left foot ulcer requiring multiple I &D's      NUTRITION INTERVENTIONS:  Meals/Snacks: General/healthful diet   Supplements: Commercial supplement              GOAL:   Consume > 75% all meals + ONS and one protein source/meal to meet estimated protein needs within next 4-6 days    Cultural, Protestant, or Ethnic Dietary Needs: None     EDUCATION & DISCHARGE NEEDS:    [] None Identified   [x] Identified and Education Provided/Documented- educated pt on protein content of foods and fiber   [] Identified and Pt declined/was not appropriate      [x] Interdisciplinary Care Plan Reviewed/Documented    [x] Discharge Needs:   Follow high protein diet at home to help with wound healing   [] No Nutrition Related Discharge Needs    NUTRITION RISK:   Pt Is At Nutrition Risk  [x]     No Nutrition Risk Identified  []       PT SEEN FOR:    [x]  MD Consult: []Calorie Count      []Diabetic Diet Education        []Diet Education     []Electrolyte Management     [x]General Nutrition Management and Supplements     []Management of Tube Feeding     []TPN Recommendations    []  RN Referral:  []MST score >=2     []Enteral/Parenteral Nutrition PTA     []Pregnant: Gestational DM or Multigestation                 [] Pressure Ulcer    []  Low BMI      []  Length of Stay       [] Dysphagia Diet         [] Ventilator  []  Follow-up     Previous Recommendations:   [] Implemented          [] Not Implemented          [x] Not Applicable    Previous Goal:   [] Met              [] Progressing Towards Goal              [] Not Progressing Towards Goal   [x] Not Applicable            Kelly Youngblood, [address]  Pager [phone]  Phone [phone]

## 2019-06-04 NOTE — PROGRESS NOTES
Per IDR, Pt is currently on IV ABX. DC  plan and date is TBD.          Ino [address], [facility], [address] [place] [address]

## 2019-06-04 NOTE — PROGRESS NOTES
Bedside shift change report given to Virgilio Gutierrez RN (oncoming nurse) by Rachel Rocha RN (offgoing nurse). Report included the following information SBAR, MAR, Accordion, Recent Results and Med Rec Status.

## 2019-06-04 NOTE — ROUTINE PROCESS
Interdisciplinary team rounds were held 6/4/2019 with the following team members:Care Management, Nursing, Pharmacy, Physical Therapy and Physician. Plan of care discussed. See clinical pathway and/or care plan for interventions and desired outcomes.     Plan: working on abx plan for [employer]

## 2019-06-04 NOTE — PROGRESS NOTES
[facility] Progress Note           Unknown Yosvany MENG    [phone] Office  [phone]  Fax    2019      Assessment & Plan:   1. Chronic left foot wound status post incision and drainage on 2019. Previous cultures grew Stenotrophomonas maltophilia, Enterococcus, and Providencia. Repeat wound cultures growing SMALT, proteus, and klebsiella. Currently on tigecycline and ceftriaxone. Unfortunately due to patients allergies there are no PO abx options other than omadacycline (new tetracycline abx) which insurance will not cover due to cost. Per podiatry \"patient has been non compliant and combative throughout course due to chronic pain. Spoke to Dr. Rosa Auguste about greater saphenous intervention for insufficiency and reflux. Will need outpatient vascular testing. Non-operative compression alone will not heal chronic wounds. Recommend management per unna boot compression via wound care and vascular. Signing off from podiatric surgery standpoint\"  D/w Dr Rosa Auguste. Recommend o/p follow up with [facility] for wound care/compression and follow up with Dr Rosa Auguste in office for saphenous vein ablation (which cannot be done in hospital due to lack of equipment) Continue current abx and [address]  2. Multiple antibiotic allergies including vancomycin, sulfa, and ciprofloxacin. 3. History of alcohol abuse. 4. Chronic pain. The patient is on methadone for chronic pain. He denies a history of drug abuse. 5. History of bladder cancer. 6. History of hemorrhagic stroke with left-sided hemiparesis.                Subjective:     Leg pain improving.  Seen with [facility]    Objective:     Vitals:   Visit Vitals  /86 (BP 1 Location: Right arm, BP Patient Position: At rest)   Pulse 60   Temp 98 °F (36.7 °C)   Resp 18   Ht 6' 1\" (1.854 m)   Wt 108.9 kg (240 lb)   SpO2 96%   BMI 31.66 kg/m²        Tmax:  Temp (24hrs), Av.8 °F (36.6 °C), Min:97.3 °F (36.3 °C), Max:98.1 °F (36.7 °C)      Exam:   Patient is intubated:  no    Physical Examination:   General:  Alert, cooperative, no distress   Head:  Normocephalic, atraumatic. Eyes:  Conjunctivae clear   Neck: Supple       Lungs:   No distress. Chest wall:     Heart:     Abdomen:      Extremities: Moves all. Foot wounds/erythema improving   Skin:  No rash   Neurologic: CNII-XII intact. Normal strength     Labs:        No lab exists for component: ITNL   No results for input(s): CPK, CKMB, TROIQ in the last 72 hours. No results for input(s): NA, K, CL, CO2, BUN, CREA, GLU, PHOS, MG, ALB, WBC, HGB, HCT, PLT, HGBEXT, HCTEXT, PLTEXT, HGBEXT, HCTEXT, PLTEXT in the last 72 hours. No lab exists for component:  CA  No results for input(s): INR, PTP, APTT in the last 72 hours. No lab exists for component: INREXT, INREXT  Needs: urine analysis, urine sodium, protein and creatinine  No results found for: VIVEK, CREAU      Cultures:     Lab Results   Component Value Date/Time    Specimen Description: BLOOD 02/04/2010 10:55 PM    Specimen Description: BLOOD 02/12/2009 11:30 PM     Lab Results   Component Value Date/Time    Culture result: HEAVY DIPHTHEROIDS (A) 05/30/2019 03:00 PM    Culture result: (A) 05/30/2019 03:00 PM     LIGHT STENOTROPHOMONAS ([place]ow Hoit.) MALTOPHILIA CLSI GUIDELINES DO NOT RECOMMEND REPORTING SUSCEPTIBILITIES FOR S. MALTOPHILIA. TRIMETHOPRIM/SULFAMETHOXAZOLE IS THE DRUG OF CHOICE.     Culture result: FEW PROTEUS MIRABILIS (A) 05/30/2019 03:00 PM    Culture result: FEW KLEBSIELLA PNEUMONIAE (A) 05/30/2019 03:00 PM       Radiology:     Medications       Current Facility-Administered Medications   Medication Dose Route Frequency Last Dose    sodium hypochlorite (QUARTER STRENGTH DAKIN'S) 0.125% irrigation (bottle)   Topical EVERY OTHER DAY      polyethylene glycol (MIRALAX) packet 17 g  17 g Oral DAILY 17 g at 06/03/19 0907    acetaminophen (TYLENOL) tablet 650 mg  650 mg Oral Q4H  mg at 06/03/19 2220    melatonin tablet 3 mg  3 mg Oral QHS PRN      methadone (DOLOPHINE) 10 mg/mL concentrated solution 130 mg  130 mg Oral DAILY 130 mg at 06/03/19 0908    colchicine tablet 0.6 mg  0.6 mg Oral DAILY PRN 0.6 mg at 06/03/19 1034    lisinopril (PRINIVIL, ZESTRIL) tablet 40 mg  40 mg Oral DAILY 40 mg at 06/04/19 0903    amLODIPine (NORVASC) tablet 10 mg  10 mg Oral DAILY 10 mg at 06/04/19 0828    senna-docusate (PERICOLACE) 8.6-50 mg per tablet 1 Tab  1 Tab Oral DAILY 1 Tab at 06/04/19 0829    sertraline (ZOLOFT) tablet 50 mg  50 mg Oral DAILY 50 mg at 06/04/19 0828    allopurinol (ZYLOPRIM) tablet 100 mg  100 mg Oral DAILY 100 mg at 06/02/19 0900    tamsulosin (FLOMAX) capsule 0.4 mg  0.4 mg Oral DAILY 0.4 mg at 06/04/19 0829    nicotine (NICODERM CQ) 21 mg/24 hr patch 1 Patch  1 Patch TransDERmal DAILY PRN      OLANZapine (ZyPREXA) tablet 5 mg  5 mg Oral Q6H PRN      sodium chloride (NS) flush 5-40 mL  5-40 mL IntraVENous Q8H 10 mL at 06/04/19 0631    sodium chloride (NS) flush 5-40 mL  5-40 mL IntraVENous PRN 10 mL at 06/02/19 0419    diphenhydrAMINE (BENADRYL) capsule 25 mg  25 mg Oral Q4H PRN      ondansetron (ZOFRAN) injection 4 mg  4 mg IntraVENous Q4H PRN 4 mg at 06/03/19 0653    enoxaparin (LOVENOX) injection 40 mg  40 mg SubCUTAneous Q24H 40 mg at 06/03/19 0348    cefTRIAXone (ROCEPHIN) 2 g in 0.9% sodium chloride (MBP/ADV) 50 mL  2 g IntraVENous Q24H 2 g at 06/03/19 2105    TIGEcycline (TYGACIL) 50 mg in 0.9% sodium chloride (MBP/ADV) 100 mL  50 mg IntraVENous Q12H 50 mg at 06/04/19 0204    pantoprazole (PROTONIX) tablet 40 mg  40 mg Oral ACB 40 mg at 06/04/19 0631    alum-mag hydroxide-simeth (MYLANTA) oral suspension 30 mL  30 mL Oral Q4H PRN 30 mL at 05/31/19 0405    levothyroxine (SYNTHROID) tablet 100 mcg  100 mcg Oral  mcg at 06/04/19 0631    oxyCODONE IR (ROXICODONE) tablet 10 mg  10 mg Oral Q6H PRN 10 mg at 06/04/19 1461           Case discussed with:NILDA BAR, vascular surgery      Marcelo Kumar Jeanette Friedman

## 2019-06-04 NOTE — PROGRESS NOTES
Patient having little relief from his pain with his roxicodone. Dr. Carolann Antunez notified. Roxicodone 15 mg po ordered as a one time dose.

## 2019-06-04 NOTE — PROGRESS NOTES
Spiritual Care Assessment/Progress Note  [address]      NAME: Sarah Gudino      MRN: [id number]  AGE: 46 y.o. SEX: male  Cheondoism Affiliation: Jain   Language: English     6/4/2019     Total Time (in minutes): 5     Spiritual Assessment begun in [facility]  MED SURG 2 through conversation with:         []Patient        [] Family    [] Friend(s)        Reason for Consult: Initial/Spiritual assessment, patient floor     Spiritual beliefs: (Please include comment if needed)     [] Identifies with a kendy tradition:         [] Supported by a kendy community:            [] Claims no spiritual orientation:           [] Seeking spiritual identity:                [] Adheres to an individual form of spirituality:           [x] Not able to assess:                           Identified resources for coping:      [] Prayer                               [] Music                  [] Guided Imagery     [] Family/friends                 [] Pet visits     [] Devotional reading                         [x] Unknown     [] Other:                                               Interventions offered during this visit: (See comments for more details)    Patient Interventions: Spiritual care volunteer support           Plan of Care:     [] Support spiritual and/or cultural needs    [] Support AMD and/or advance care planning process      [] Support grieving process   [] Coordinate Rites and/or Rituals    [] Coordination with community clergy   [] No spiritual needs identified at this time   [] Detailed Plan of Care below (See Comments)  [] Make referral to Music Therapy  [] Make referral to Pet Therapy     [] Make referral to Addiction services  [] Make referral to [facility]  [] Make referral to Spiritual Care Partner  [] No future visits requested        [x] Follow up visits as needed     Comments:  visit for initial spiritual assessment.   Patient reclining in bed talking on telephone, did not desire a  visit at this time. Will continue to follow up as needed and upon request as able. Visited by Rev. Justine Cox, [address] [place] [address] Road paging service: [phone])

## 2019-06-04 NOTE — PROGRESS NOTES
Gigi Marino [facility] 79  [address] [employer], [place], [address] [place] [address]  [phone]      Medical Progress Note      NAME: Sixto Yadav   :  1965  MRM:  [id number]    Date/Time: 2019  7:51 AM       Assessment and Plan:   1. Chronic left foot infection -I&D of ulcer with Bx by podiatry on . CT showed no drainable abscess or e/o osteomyelitis. Wound culture on previous admission polymicrobial, including enterococcus, Providencia, and Stenotrophomonas. Treated with IV tigecycline and CTX till 5/10/19. OP vascular f/u with wound care and DEVEN  Boots recommended by podiatry, but he did not go as he was in inpatient psych unit. On Tigecycline and CTX( multiple drug allergy). Evaluated by ID, no oral equivalent for his regimen/cx results 2/2 allergies, unclear endpoint here. Wound culture: multi organisms. Evaluated by podiatry and no plan for intervention. Seen by vascular and recommended outpatient FU for further testing (wants to follow-up with Dr. Yasmeen Young)     2. HTN - Stable on lisinopril and Norvasc. IV hydralazine PRN     3. Chronic pain syndrome / Depression - Continue methadone. Continue sertraline and prn QHS trazodone. He was just [facility] from psych unit 2 days ago prior to this admission.     4. ETOH abuse - per report, but doubtful he had acces in [place].       5.  Gout - No symptoms on allopurinol. Has PRN colchicine     6. Malignant neoplasm of urinary bladder - s/p surgery in 2018 at [facility]. Cr stable. Has missed his outpatient followup and he requested Urology to see him here last visit. Continue flomax     7.  Hypothyroidism - TSH markedly was elevated presumed due to non compliance last admit. Continue Synthroid at current dose for now, but repeat TFTs in 4 weeks     8. Hx of hemorrhagic stroke with left sided hemiparesis / Physical debility - Uses wheelchair and walker at baseline. PT/OT consulted. Continue supportive care     9.   Chronic obstructive pulmonary disease - Stable. PRN nebs     10. Tobacco abuse - nicoderm    11. Non-compliance. I have strongly reinforced the need for continuity of care as the patient has fractured care throughout [place] and surrounding [facility] because \"no one can tell me whats going on. \"         Subjective:     Chief Complaint:  Follow up of pt who was admitted with infected woung. C/O pain on the LT foot     ROS:  (bold if positive, if negative)      Tolerating PT  Tolerating Diet        Objective:     Last 24hrs VS reviewed since prior progress note.  Most recent are:    Visit Vitals  /86 (BP 1 Location: Right arm, BP Patient Position: At rest)   Pulse 60   Temp 98 °F (36.7 °C)   Resp 18   Ht 6' 1\" (1.854 m)   Wt 108.9 kg (240 lb)   SpO2 96%   BMI 31.66 kg/m²     SpO2 Readings from Last 6 Encounters:   06/04/19 96%   05/27/19 95%   05/20/19 99%   05/10/19 92%   10/12/18 96%   09/28/18 95%          No intake or output data in the 24 hours ending 06/04/19 7192     Physical Exam:    Gen:  Well-developed, well-nourished, in no acute distress  HEENT:  Pink conjunctivae, PERRL, hearing intact to voice, moist mucous membranes  Neck:  Supple, without masses, thyroid non-tender  Resp:  No accessory muscle use, clear breath sounds without wheezes rales or rhonchi  Card:  No murmurs, normal S1, S2 without thrills, bruits or peripheral edema  Abd:  Soft, non-tender, non-distended, normoactive bowel sounds are present, no palpable organomegaly and no detectable hernias  Lymph:  No cervical or inguinal adenopathy  Musc:  No cyanosis or clubbing  Skin:  LT foot ulcer   Neuro:  Cranial nerves are grossly intact, LT side weakness  Psych:  Good insight, oriented to person, place and time, alert  __________________________________________________________________  Medications Reviewed: (see below)  Medications:     Current Facility-Administered Medications   Medication Dose Route Frequency    sodium hypochlorite (QUARTER STRENGTH DAKIN'S) 0.125% irrigation (bottle)   Topical EVERY OTHER DAY    polyethylene glycol (MIRALAX) packet 17 g  17 g Oral DAILY    acetaminophen (TYLENOL) tablet 650 mg  650 mg Oral Q4H PRN    melatonin tablet 3 mg  3 mg Oral QHS PRN    methadone (DOLOPHINE) 10 mg/mL concentrated solution 130 mg  130 mg Oral DAILY    colchicine tablet 0.6 mg  0.6 mg Oral DAILY PRN    lisinopril (PRINIVIL, ZESTRIL) tablet 40 mg  40 mg Oral DAILY    amLODIPine (NORVASC) tablet 10 mg  10 mg Oral DAILY    senna-docusate (PERICOLACE) 8.6-50 mg per tablet 1 Tab  1 Tab Oral DAILY    sertraline (ZOLOFT) tablet 50 mg  50 mg Oral DAILY    allopurinol (ZYLOPRIM) tablet 100 mg  100 mg Oral DAILY    tamsulosin (FLOMAX) capsule 0.4 mg  0.4 mg Oral DAILY    nicotine (NICODERM CQ) 21 mg/24 hr patch 1 Patch  1 Patch TransDERmal DAILY PRN    OLANZapine (ZyPREXA) tablet 5 mg  5 mg Oral Q6H PRN    sodium chloride (NS) flush 5-40 mL  5-40 mL IntraVENous Q8H    sodium chloride (NS) flush 5-40 mL  5-40 mL IntraVENous PRN    diphenhydrAMINE (BENADRYL) capsule 25 mg  25 mg Oral Q4H PRN    ondansetron (ZOFRAN) injection 4 mg  4 mg IntraVENous Q4H PRN    enoxaparin (LOVENOX) injection 40 mg  40 mg SubCUTAneous Q24H    cefTRIAXone (ROCEPHIN) 2 g in 0.9% sodium chloride (MBP/ADV) 50 mL  2 g IntraVENous Q24H    TIGEcycline (TYGACIL) 50 mg in 0.9% sodium chloride (MBP/ADV) 100 mL  50 mg IntraVENous Q12H    pantoprazole (PROTONIX) tablet 40 mg  40 mg Oral ACB    alum-mag hydroxide-simeth (MYLANTA) oral suspension 30 mL  30 mL Oral Q4H PRN    levothyroxine (SYNTHROID) tablet 100 mcg  100 mcg Oral ACB    oxyCODONE IR (ROXICODONE) tablet 10 mg  10 mg Oral Q6H PRN        Lab Data Reviewed: (see below)  Lab Review:     No results for input(s): WBC, HGB, HCT, PLT, HGBEXT, HCTEXT, PLTEXT, HGBEXT, HCTEXT, PLTEXT in the last 72 hours.   No results for input(s): NA, K, CL, CO2, GLU, BUN, CREA, CA, MG, PHOS, ALB, TBIL, TBILI, SGOT, ALT, INR in the last 72 hours.    No lab exists for component: INREXT, INREXT  Lab Results   Component Value Date/Time    Glucose (POC) 150 (H) 03/19/2018 11:45 AM    Glucose (POC) 123 (H) 03/18/2018 08:55 PM    Glucose (POC) 96 03/16/2018 04:58 PM    Glucose (POC) 116 (H) 12/12/2012 12:42 PM    Glucose (POC) 99 12/11/2012 11:27 PM     No results for input(s): PH, PCO2, PO2, HCO3, FIO2 in the last 72 hours. No results for input(s): INR in the last 72 hours. No lab exists for component: INREXT, INREXT  All Micro Results     Procedure Component Value Units Date/Time    CULTURE, BLOOD, PAIRED [[id number]] Collected:  05/30/19 0000    Order Status:  Completed Specimen:  Blood Updated:  06/04/19 0508     Special Requests: NO SPECIAL REQUESTS        Culture result: NO GROWTH 5 DAYS       CULTURE, WOUND Itzel Dayhoff STAIN [[id number]]  (Abnormal)  (Susceptibility) Collected:  05/30/19 1500    Order Status:  Completed Specimen:  Foot Updated:  06/03/19 0959     Special Requests: NO SPECIAL REQUESTS        GRAM STAIN NO WBC'S SEEN         3+ GRAM NEGATIVE RODS               2+ GRAM POSITIVE COCCOBACILLI           Culture result: HEAVY DIPHTHEROIDS               LIGHT STENOTROPHOMONAS (Boni Stands.) MALTOPHILIA CLSI GUIDELINES DO NOT RECOMMEND REPORTING SUSCEPTIBILITIES FOR S. MALTOPHILIA. TRIMETHOPRIM/SULFAMETHOXAZOLE IS THE DRUG OF CHOICE. FEW PROTEUS MIRABILIS         FEW KLEBSIELLA PNEUMONIAE       CULTURE, [facility] STAIN [[id number]]  (Abnormal)  (Susceptibility) Collected:  05/30/19 0141    Order Status:  Completed Specimen:  Wound Drainage Updated:  06/02/19 0920     Special Requests: NO SPECIAL REQUESTS        GRAM STAIN NO WBC'S SEEN               OCCASIONAL APPARENT GRAM NEGATIVE RODS           Culture result: LIGHT PROTEUS MIRABILIS               LIGHT STENOTROPHOMONAS (Boni Stands.) MALTOPHILIA CLSI GUIDELINES DO NOT RECOMMEND REPORTING SUSCEPTIBILITIES FOR S. MALTOPHILIA. TRIMETHOPRIM/SULFAMETHOXAZOLE IS THE DRUG OF CHOICE. HEAVY MIXED SKIN YULIA ISOLATED                I have reviewed notes of prior 24hr. Other pertinent lab:       Total time spent with patient: 30 min                  Care Plan discussed with: Patient, Nursing Staff, Consultant/Specialist and >50% of time spent in counseling and coordination of care    Discussed:  Care Plan    Prophylaxis:  Lovenox    Disposition:  Home w/Family with [facility]           ___________________________________________________    Attending Physician: Kulwant Jolley DO

## 2019-06-05 PROCEDURE — 74011250636 HC RX REV CODE- 250/636: Performed by: INTERNAL MEDICINE

## 2019-06-05 PROCEDURE — 65270000029 HC RM PRIVATE

## 2019-06-05 PROCEDURE — 74011000258 HC RX REV CODE- 258: Performed by: INTERNAL MEDICINE

## 2019-06-05 PROCEDURE — 97530 THERAPEUTIC ACTIVITIES: CPT

## 2019-06-05 PROCEDURE — 74011250637 HC RX REV CODE- 250/637: Performed by: INTERNAL MEDICINE

## 2019-06-05 PROCEDURE — 97116 GAIT TRAINING THERAPY: CPT

## 2019-06-05 RX ADMIN — ACETAMINOPHEN 650 MG: 325 TABLET ORAL at 02:19

## 2019-06-05 RX ADMIN — OXYCODONE HYDROCHLORIDE 10 MG: 5 TABLET ORAL at 00:39

## 2019-06-05 RX ADMIN — SERTRALINE HYDROCHLORIDE 50 MG: 50 TABLET ORAL at 08:01

## 2019-06-05 RX ADMIN — SODIUM CHLORIDE 50 MG: 900 INJECTION, SOLUTION INTRAVENOUS at 14:32

## 2019-06-05 RX ADMIN — Medication 10 ML: at 06:46

## 2019-06-05 RX ADMIN — OXYCODONE HYDROCHLORIDE 10 MG: 5 TABLET ORAL at 06:46

## 2019-06-05 RX ADMIN — TAMSULOSIN HYDROCHLORIDE 0.4 MG: 0.4 CAPSULE ORAL at 08:01

## 2019-06-05 RX ADMIN — PANTOPRAZOLE SODIUM 40 MG: 40 TABLET, DELAYED RELEASE ORAL at 06:45

## 2019-06-05 RX ADMIN — ACETAMINOPHEN 650 MG: 325 TABLET ORAL at 22:16

## 2019-06-05 RX ADMIN — METHADONE HYDROCHLORIDE 130 MG: 10 CONCENTRATE ORAL at 08:01

## 2019-06-05 RX ADMIN — POLYETHYLENE GLYCOL 3350 17 G: 17 POWDER, FOR SOLUTION ORAL at 08:02

## 2019-06-05 RX ADMIN — CEFTRIAXONE 2 G: 2 INJECTION, POWDER, FOR SOLUTION INTRAMUSCULAR; INTRAVENOUS at 22:16

## 2019-06-05 RX ADMIN — OXYCODONE HYDROCHLORIDE 10 MG: 5 TABLET ORAL at 18:24

## 2019-06-05 RX ADMIN — OXYCODONE HYDROCHLORIDE 10 MG: 5 TABLET ORAL at 12:28

## 2019-06-05 RX ADMIN — Medication 10 ML: at 14:33

## 2019-06-05 RX ADMIN — LEVOTHYROXINE SODIUM 100 MCG: 100 TABLET ORAL at 06:45

## 2019-06-05 RX ADMIN — LISINOPRIL 40 MG: 20 TABLET ORAL at 08:01

## 2019-06-05 RX ADMIN — AMLODIPINE BESYLATE 10 MG: 5 TABLET ORAL at 08:01

## 2019-06-05 RX ADMIN — SENNOSIDES AND DOCUSATE SODIUM 1 TABLET: 8.6; 5 TABLET ORAL at 08:01

## 2019-06-05 RX ADMIN — SODIUM CHLORIDE 50 MG: 900 INJECTION, SOLUTION INTRAVENOUS at 02:05

## 2019-06-05 NOTE — PROGRESS NOTES
Per chart review, PT recommendation for SNF. [facility] Will need to follow-up on ins benefits for SNF.          Tamia Antonio, [facility], [address] [place] [address]

## 2019-06-05 NOTE — PROGRESS NOTES
Gigi Marino [facility] 79  [address] [facility], [address] [place] [address]  [phone]      Medical Progress Note      NAME: Nasreen Dutton   :  1965  MRM:  [id number]    Date/Time: 2019  7:51 AM       Assessment and Plan:   1. Chronic left foot infection -I&D of ulcer with Bx by podiatry on . CT showed no drainable abscess or e/o osteomyelitis. Wound culture on previous admission polymicrobial, including enterococcus, Providencia, and Stenotrophomonas. Treated with IV tigecycline and CTX till 5/10/19. OP vascular f/u with wound care and DEVEN  Boots recommended by podiatry, but he did not go as he was in inpatient psych unit. On Tigecycline and CTX( multiple drug allergy). Evaluated by ID, no oral equivalent for his regimen/cx results 2/2 allergies, unclear endpoint here. Wound culture: multi organisms. Evaluated by podiatry and no plan for intervention. Seen by vascular and recommended outpatient FU for further testing and GSV ablation (wants to follow-up with Dr. Xenia Rivas)     2. HTN - Stable on lisinopril and Norvasc. IV hydralazine PRN     3. Chronic pain syndrome / Depression - Continue methadone. Continue sertraline and prn QHS trazodone. He was just [facility] from psych unit 2 days ago prior to this admission.     4. ETOH abuse - per report, but doubtful he had acces in [place].       5.  Gout - No symptoms on allopurinol. Has PRN colchicine     6. Malignant neoplasm of urinary bladder - s/p surgery in 2018 at [facility]. Cr stable. Has missed his outpatient followup and he requested Urology to see him here last visit. Continue flomax     7.  Hypothyroidism - TSH markedly was elevated presumed due to non compliance last admit. Continue Synthroid at current dose for now, but repeat TFTs in 4 weeks     8. Hx of hemorrhagic stroke with left sided hemiparesis / Physical debility - Uses wheelchair and walker at baseline. PT/OT consulted. Continue supportive care     9. Chronic obstructive pulmonary disease - Stable. PRN nebs     10. Tobacco abuse - nicoderm    11. Non-compliance. I have strongly reinforced the need for continuity of care as the patient has fractured care throughout [place] and surrounding [facility] because \"no one can tell me whats going on. \"         Subjective:     Chief Complaint:  Follow up of pt who was admitted with infected woung. C/O pain on the LT foot     ROS:  (bold if positive, if negative)      Tolerating PT  Tolerating Diet        Objective:     Last 24hrs VS reviewed since prior progress note.  Most recent are:    Visit Vitals  /71 (BP 1 Location: Right arm, BP Patient Position: At rest)   Pulse (!) 53   Temp 97.6 °F (36.4 °C)   Resp 16   Ht 6' 1\" (1.854 m)   Wt 108.2 kg (238 lb 9.6 oz)   SpO2 98%   BMI 31.48 kg/m²     SpO2 Readings from Last 6 Encounters:   06/05/19 98%   05/27/19 95%   05/20/19 99%   05/10/19 92%   10/12/18 96%   09/28/18 95%            Intake/Output Summary (Last 24 hours) at 6/5/2019 0945  Last data filed at 6/5/2019 5665  Gross per 24 hour   Intake --   Output 600 ml   Net -600 ml        Physical Exam:    Gen:  Well-developed, well-nourished, in no acute distress  HEENT:  Pink conjunctivae, PERRL, hearing intact to voice, moist mucous membranes  Neck:  Supple, without masses, thyroid non-tender  Resp:  No accessory muscle use, clear breath sounds without wheezes rales or rhonchi  Card:  No murmurs, normal S1, S2 without thrills, bruits or peripheral edema  Abd:  Soft, non-tender, non-distended, normoactive bowel sounds are present, no palpable organomegaly and no detectable hernias  Lymph:  No cervical or inguinal adenopathy  Musc:  No cyanosis or clubbing  Skin:  LT foot ulcer   Neuro:  Cranial nerves are grossly intact, LT side weakness  Psych:  Good insight, oriented to person, place and time, alert  __________________________________________________________________  Medications Reviewed: (see below)  Medications: Current Facility-Administered Medications   Medication Dose Route Frequency    sodium hypochlorite (QUARTER STRENGTH DAKIN'S) 0.125% irrigation (bottle)   Topical EVERY OTHER DAY    polyethylene glycol (MIRALAX) packet 17 g  17 g Oral DAILY    acetaminophen (TYLENOL) tablet 650 mg  650 mg Oral Q4H PRN    melatonin tablet 3 mg  3 mg Oral QHS PRN    methadone (DOLOPHINE) 10 mg/mL concentrated solution 130 mg  130 mg Oral DAILY    colchicine tablet 0.6 mg  0.6 mg Oral DAILY PRN    lisinopril (PRINIVIL, ZESTRIL) tablet 40 mg  40 mg Oral DAILY    amLODIPine (NORVASC) tablet 10 mg  10 mg Oral DAILY    senna-docusate (PERICOLACE) 8.6-50 mg per tablet 1 Tab  1 Tab Oral DAILY    sertraline (ZOLOFT) tablet 50 mg  50 mg Oral DAILY    allopurinol (ZYLOPRIM) tablet 100 mg  100 mg Oral DAILY    tamsulosin (FLOMAX) capsule 0.4 mg  0.4 mg Oral DAILY    nicotine (NICODERM CQ) 21 mg/24 hr patch 1 Patch  1 Patch TransDERmal DAILY PRN    OLANZapine (ZyPREXA) tablet 5 mg  5 mg Oral Q6H PRN    sodium chloride (NS) flush 5-40 mL  5-40 mL IntraVENous Q8H    sodium chloride (NS) flush 5-40 mL  5-40 mL IntraVENous PRN    diphenhydrAMINE (BENADRYL) capsule 25 mg  25 mg Oral Q4H PRN    ondansetron (ZOFRAN) injection 4 mg  4 mg IntraVENous Q4H PRN    enoxaparin (LOVENOX) injection 40 mg  40 mg SubCUTAneous Q24H    cefTRIAXone (ROCEPHIN) 2 g in 0.9% sodium chloride (MBP/ADV) 50 mL  2 g IntraVENous Q24H    TIGEcycline (TYGACIL) 50 mg in 0.9% sodium chloride (MBP/ADV) 100 mL  50 mg IntraVENous Q12H    pantoprazole (PROTONIX) tablet 40 mg  40 mg Oral ACB    alum-mag hydroxide-simeth (MYLANTA) oral suspension 30 mL  30 mL Oral Q4H PRN    levothyroxine (SYNTHROID) tablet 100 mcg  100 mcg Oral ACB    oxyCODONE IR (ROXICODONE) tablet 10 mg  10 mg Oral Q6H PRN        Lab Data Reviewed: (see below)  Lab Review:     No results for input(s): WBC, HGB, HCT, PLT, HGBEXT, HCTEXT, PLTEXT, HGBEXT, HCTEXT, PLTEXT in the last 72 hours. No results for input(s): NA, K, CL, CO2, GLU, BUN, CREA, CA, MG, PHOS, ALB, TBIL, TBILI, SGOT, ALT, INR in the last 72 hours. No lab exists for component: INREXT, INREXT  Lab Results   Component Value Date/Time    Glucose (POC) 150 (H) 03/19/2018 11:45 AM    Glucose (POC) 123 (H) 03/18/2018 08:55 PM    Glucose (POC) 96 03/16/2018 04:58 PM    Glucose (POC) 116 (H) 12/12/2012 12:42 PM    Glucose (POC) 99 12/11/2012 11:27 PM     No results for input(s): PH, PCO2, PO2, HCO3, FIO2 in the last 72 hours. No results for input(s): INR in the last 72 hours. No lab exists for component: INREXT, INREXT  All Micro Results     Procedure Component Value Units Date/Time    CULTURE, BLOOD, PAIRED [[id number]] Collected:  05/30/19 0000    Order Status:  Completed Specimen:  Blood Updated:  06/04/19 0508     Special Requests: NO SPECIAL REQUESTS        Culture result: NO GROWTH 5 DAYS       CULTURE, WOUND Harlen Fickle STAIN [[id number]]  (Abnormal)  (Susceptibility) Collected:  05/30/19 1500    Order Status:  Completed Specimen:  Foot Updated:  06/03/19 0959     Special Requests: NO SPECIAL REQUESTS        GRAM STAIN NO WBC'S SEEN         3+ GRAM NEGATIVE RODS               2+ GRAM POSITIVE COCCOBACILLI           Culture result: HEAVY DIPHTHEROIDS               LIGHT STENOTROPHOMONAS (Citlaly Ou.) MALTOPHILIA CLSI GUIDELINES DO NOT RECOMMEND REPORTING SUSCEPTIBILITIES FOR S. MALTOPHILIA. TRIMETHOPRIM/SULFAMETHOXAZOLE IS THE DRUG OF CHOICE.             FEW PROTEUS MIRABILIS         FEW KLEBSIELLA PNEUMONIAE       CULTURE, [place]born Mare STAIN [[id number]]  (Abnormal)  (Susceptibility) Collected:  05/30/19 0141    Order Status:  Completed Specimen:  Wound Drainage Updated:  06/02/19 0920     Special Requests: NO SPECIAL REQUESTS        GRAM STAIN NO WBC'S SEEN               OCCASIONAL APPARENT GRAM NEGATIVE RODS           Culture result: LIGHT PROTEUS MIRABILIS               LIGHT STENOTROPHOMONAS (Citlaly Ou.) MALTOPHILIA CLSI GUIDELINES DO NOT RECOMMEND REPORTING SUSCEPTIBILITIES FOR S. MALTOPHILIA. TRIMETHOPRIM/SULFAMETHOXAZOLE IS THE DRUG OF CHOICE. HEAVY MIXED SKIN YULIA ISOLATED                I have reviewed notes of prior 24hr. Other pertinent lab:       Total time spent with patient: 25 min                  Care Plan discussed with: Patient, Nursing Staff, Consultant/Specialist and >50% of time spent in counseling and coordination of care    Discussed:  Care Plan    Prophylaxis:  Lovenox    Disposition:  Home w/Family with [place]           ___________________________________________________    Attending Physician: Samuel Osuna DO

## 2019-06-05 NOTE — PROGRESS NOTES
Bedside and Verbal shift change report given to Mia Cole RN (oncoming nurse) by Tricia Babb RN (offgoing nurse). Report included the following information SBAR, Kardex, MAR and Accordion.

## 2019-06-05 NOTE — PROGRESS NOTES
Bedside shift change report given to Goldy Young RN (oncoming nurse) by Griselda Phillips, RN  (offgoing nurse). Report included the following information SBAR, Intake/Output, MAR, Accordion, Recent Results and Med Rec Status.

## 2019-06-05 NOTE — PROGRESS NOTES
Problem: Mobility Impaired (Adult and Pediatric)  Goal: *Acute Goals and Plan of Care (Insert Text)  Description  Physical Therapy Goals  Initiated 5/31/2019  1. Patient will move from supine to sit and sit to supine , scoot up and down and roll side to side in bed with modified independence within 7 day(s). 2.  Patient will transfer from bed to chair and chair to bed with modified independence using the least restrictive device within 7 day(s). 3.  Patient will perform sit to stand with modified independence within 7 day(s). 4.  Patient will ambulate with modified independence for 200 feet with the least restrictive device within 7 day(s). 5.  Patient will ascend/descend 2 stairs with  handrail(s) with modified independence within 7 day(s). Note:   PHYSICAL THERAPY TREATMENT  Patient: Tejas Carrington (47 y.o. male)  Date: 6/5/2019  Diagnosis: Open wound, lower leg [S81.809A] <principal problem not specified>       Precautions:    Chart, physical therapy assessment, plan of care and goals were reviewed. ASSESSMENT:  Pt with left side weakness PTA. Pt comes to sit with CGA. Pt to stand with CGA. Pt ambulated 10ft with a single point cane CGA. Pt has antalgic gait decreased WB on right LE. Pt becomes more unsteady at increased distances. Pt back to bed with CGA. Progression toward goals:  ?    Improving appropriately and progressing toward goals  ? Improving slowly and progressing toward goals  ? Not making progress toward goals and plan of care will be adjusted     PLAN:  Patient continues to benefit from skilled intervention to address the above impairments. Continue treatment per established plan of care.   Discharge Recommendations:  Kranthi Andrews  Further Equipment Recommendations for Discharge:  straight cane     SUBJECTIVE:       OBJECTIVE DATA SUMMARY:   Critical Behavior:              Functional Mobility Training:  Bed Mobility:     Supine to Sit: Contact guard assistance  Sit to Supine: Contact guard assistance           Transfers:  Sit to Stand: Contact guard assistance  Stand to Sit: Contact guard assistance                             Balance:  Sitting: Intact  Standing: Without support  Standing - Static: Fair  Ambulation/Gait Training:  Distance (ft): 10 Feet (ft)  Assistive Device: Cane, straight  Ambulation - Level of Assistance: Contact guard assistance        Gait Abnormalities: Decreased step clearance        Base of Support: Widened     Speed/Reva: Pace decreased (<100 feet/min)  Step Length: Right shortened;Left shortened                Pain:  Pain Scale 1: Numeric (0 - 10)  Pain Intensity 1: 9  Pain Location 1: Foot  Pain Orientation 1: Left  Pain Description 1: Aching  Pain Intervention(s) 1: Medication (see MAR)  Activity Tolerance:   Pt tolerated treatment fairly well. Please refer to the flowsheet for vital signs taken during this treatment. After treatment:   ?    Patient left in no apparent distress sitting up in chair  ? Patient left in no apparent distress in bed  ? Call bell left within reach  ? Nursing notified  ? Caregiver present  ?     Bed alarm activated    COMMUNICATION/COLLABORATION:   The patient?s plan of care was discussed with: Physical Therapist    Elan Petty PTA   Time Calculation: 23 mins

## 2019-06-05 NOTE — ROUTINE PROCESS
Interdisciplinary team rounds were held 6/5/2019 with the following team members:Care Management, Nursing, Nutrition, Pharmacy, Physical Therapy and Physician. .    Plan of care discussed. See clinical pathway and/or care plan for interventions and desired outcomes. Plan: continue abx for 10 days.

## 2019-06-06 PROCEDURE — 74011250636 HC RX REV CODE- 250/636: Performed by: INTERNAL MEDICINE

## 2019-06-06 PROCEDURE — 74011000258 HC RX REV CODE- 258: Performed by: INTERNAL MEDICINE

## 2019-06-06 PROCEDURE — 74011250637 HC RX REV CODE- 250/637: Performed by: INTERNAL MEDICINE

## 2019-06-06 PROCEDURE — 65270000029 HC RM PRIVATE

## 2019-06-06 PROCEDURE — 97110 THERAPEUTIC EXERCISES: CPT

## 2019-06-06 RX ORDER — GABAPENTIN 300 MG/1
300 CAPSULE ORAL 3 TIMES DAILY
Status: DISCONTINUED | OUTPATIENT
Start: 2019-06-06 | End: 2019-06-19 | Stop reason: HOSPADM

## 2019-06-06 RX ADMIN — ACETAMINOPHEN 650 MG: 325 TABLET ORAL at 03:28

## 2019-06-06 RX ADMIN — TAMSULOSIN HYDROCHLORIDE 0.4 MG: 0.4 CAPSULE ORAL at 08:57

## 2019-06-06 RX ADMIN — ACETAMINOPHEN 650 MG: 325 TABLET ORAL at 08:59

## 2019-06-06 RX ADMIN — ACETAMINOPHEN 650 MG: 325 TABLET ORAL at 14:16

## 2019-06-06 RX ADMIN — GABAPENTIN 300 MG: 100 CAPSULE ORAL at 23:40

## 2019-06-06 RX ADMIN — OXYCODONE HYDROCHLORIDE 10 MG: 5 TABLET ORAL at 12:32

## 2019-06-06 RX ADMIN — SODIUM CHLORIDE 50 MG: 900 INJECTION, SOLUTION INTRAVENOUS at 14:16

## 2019-06-06 RX ADMIN — GABAPENTIN 300 MG: 100 CAPSULE ORAL at 16:23

## 2019-06-06 RX ADMIN — COLCHICINE 0.6 MG: 0.6 TABLET, FILM COATED ORAL at 09:27

## 2019-06-06 RX ADMIN — Medication 10 ML: at 14:17

## 2019-06-06 RX ADMIN — SERTRALINE HYDROCHLORIDE 50 MG: 50 TABLET ORAL at 08:59

## 2019-06-06 RX ADMIN — Medication 10 ML: at 22:00

## 2019-06-06 RX ADMIN — METHADONE HYDROCHLORIDE 130 MG: 10 CONCENTRATE ORAL at 08:59

## 2019-06-06 RX ADMIN — AMLODIPINE BESYLATE 10 MG: 5 TABLET ORAL at 08:59

## 2019-06-06 RX ADMIN — DAKIN'S SOLUTION 0.125% (QUARTER STRENGTH): 0.12 SOLUTION at 14:17

## 2019-06-06 RX ADMIN — OXYCODONE HYDROCHLORIDE 10 MG: 5 TABLET ORAL at 00:28

## 2019-06-06 RX ADMIN — CEFTRIAXONE 2 G: 2 INJECTION, POWDER, FOR SOLUTION INTRAMUSCULAR; INTRAVENOUS at 23:40

## 2019-06-06 RX ADMIN — OXYCODONE HYDROCHLORIDE 10 MG: 5 TABLET ORAL at 23:40

## 2019-06-06 RX ADMIN — SODIUM CHLORIDE 50 MG: 900 INJECTION, SOLUTION INTRAVENOUS at 03:21

## 2019-06-06 RX ADMIN — LISINOPRIL 40 MG: 20 TABLET ORAL at 08:59

## 2019-06-06 RX ADMIN — LEVOTHYROXINE SODIUM 100 MCG: 100 TABLET ORAL at 06:35

## 2019-06-06 RX ADMIN — ENOXAPARIN SODIUM 40 MG: 40 INJECTION SUBCUTANEOUS at 03:21

## 2019-06-06 RX ADMIN — PANTOPRAZOLE SODIUM 40 MG: 40 TABLET, DELAYED RELEASE ORAL at 06:35

## 2019-06-06 RX ADMIN — OXYCODONE HYDROCHLORIDE 10 MG: 5 TABLET ORAL at 06:35

## 2019-06-06 NOTE — PROGRESS NOTES
Per IDR, plan for Pt to discharge on Monday. Recommendation for SNF. [facility] CM will continue to follow for discharge planning.      Genna Phoenix, [place], [address] [place] [address]

## 2019-06-06 NOTE — PROGRESS NOTES
PHYSICAL THERAPY TREATMENT  Patient: Rosy Clayton (70 y.o. male)  Date: 6/6/2019  Diagnosis: Open wound, lower leg [S81.096J] <principal problem not specified>       Precautions:    Chart, physical therapy assessment, plan of care and goals were reviewed. ASSESSMENT:   Pt agreeable to treatment, pain level 9/10 in spite of pain medication provided earlier prior to first attempt at therapy. Pt declined OOB due to pain but agreeable to supine LE exercises. Pt completed, LLE AAROM, RLE AROM, see below. Pt became agitated completing LE exercises, his lunch arrived with therapist offering to return later so he could eat, he declined \"No let's just get this over with. \" At end of session bed alarm activated with pt requesting to not activate due to multiple transfers EOB to use urinal.  Alarm set for exiting bed only although sounded when pt sat EOB. Notified RN post treatment about pt's need for additional pain medication as well as alarm not set. Progression toward goals:  ?    Improving appropriately and progressing toward goals  ? Improving slowly and progressing toward goals  ? Not making progress toward goals and plan of care will be adjusted     PLAN:  Patient continues to benefit from skilled intervention to address the above impairments. Continue treatment per established plan of care. Discharge Recommendations:  Kranthi Andrews  Further Equipment Recommendations for Discharge:  straight cane     SUBJECTIVE:   Patient stated ? The doctor told me to stay off of it as much as I can.?    OBJECTIVE DATA SUMMARY:   Critical Behavior:              Functional Mobility Training:  Bed Mobility:     Supine to Sit: Modified independent  Sit to Supine: Modified independent           Transfers:      NT                             Balance:     Ambulation/Gait Training:   NT                                                 Therapeutic Exercises:   LLE - AAROM  Hip/knee flexion, hip abd/add x 10 reps; RLE - AROM  Heel slides, hip abd/add and bridging  2 x 10 reps. Pain:  Pain Scale 1: Numeric (0 - 10)  Pain Intensity 1: 9  Pain Location 1: Foot  Pain Orientation 1: Left  Pain Description 1: Aching  Pain Intervention(s) 1: Medication (see MAR)  Activity Tolerance:   Fair/poor. After treatment:   ?    Patient left in no apparent distress sitting up in chair  ? Patient left in no apparent distress in bed  ? Call bell left within reach  ? Nursing notified  ? Caregiver present  ?     Bed alarm activated    COMMUNICATION/COLLABORATION:   The patient?s plan of care was discussed with: Registered Nurse    Selvin Giraldo PTA   Time Calculation: 10 mins

## 2019-06-06 NOTE — PROGRESS NOTES
PHYSICAL THERAPY NOTE:   Treatment attempted with pt requesting to have therapy when he's finished with his phone call. Will attempt treatment later today as time allows. Thank you.   Bela Grace

## 2019-06-06 NOTE — ROUTINE PROCESS
Bedside and Verbal shift change report given to Jaja Fritz RN (oncoming nurse) by Demario Underwood RN (offgoing nurse). Report included the following information SBAR, Kardex, ED Summary, Intake/Output, MAR and Recent Results.

## 2019-06-06 NOTE — PROGRESS NOTES
Gigi Marino [facility]  [address] [facility], [place], [address] [place] [address]  [phone]      Medical Progress Note      NAME: Luis Miguel Ortega   :  1965  MRM:  [id number]    Date/Time: 2019  7:51 AM       Assessment and Plan:   1. Chronic left foot infection -I&D of ulcer with Bx by podiatry on . CT showed no drainable abscess or e/o osteomyelitis. Wound culture on previous admission polymicrobial, including enterococcus, Providencia, and Stenotrophomonas. Treated with IV tigecycline and CTX till 5/10/19. Back with nearly same culture date and remains on IV tigecycline and CTX with no alternative. Will continue this through 6/10/19 with plans to go to wound care and DEVEN boots with compression at time of discharge with ? SNF afterwards (can't go to wound care center while at [facility]). Seen by vascular and recommended outpatient FU for further testing and GSV ablation (wants to follow-up with Dr. Buck Boast)     2. HTN - Stable on lisinopril and Norvasc. IV hydralazine PRN     3. Chronic pain syndrome / Depression - Continue methadone. Continue sertraline and prn QHS trazodone.     4. ETOH abuse - per report, but doubtful he had acces in [facility] [place].       5.  Gout - No symptoms on allopurinol. Has PRN colchicine     6. Malignant neoplasm of urinary bladder - s/p surgery in 2018 at [facility]. Cr stable. Has missed his outpatient followup and he requested Urology to see him here last visit. Continue flomax     7.  Hypothyroidism - TSH markedly was elevated presumed due to non compliance last admit. Continue Synthroid at current dose for now, but repeat TFTs in 4 weeks     8. Hx of hemorrhagic stroke with left sided hemiparesis / Physical debility - Uses wheelchair and walker at baseline. PT/OT consulted. Continue supportive care     9. Chronic obstructive pulmonary disease - Stable. PRN nebs     10. Tobacco abuse - nicoderm    11. Non-compliance.  I have strongly reinforced the need for continuity of care         Subjective:     Chief Complaint:  Follow up of pt who was admitted with infected woung. C/O pain on the LT foot     ROS:  (bold if positive, if negative)      Tolerating PT  Tolerating Diet        Objective:     Last 24hrs VS reviewed since prior progress note.  Most recent are:    Visit Vitals  /68 (BP 1 Location: Right arm, BP Patient Position: At rest)   Pulse (!) 55   Temp 97.5 °F (36.4 °C)   Resp 18   Ht 6' 1\" (1.854 m)   Wt 108.2 kg (238 lb 9.6 oz)   SpO2 96%   BMI 31.48 kg/m²     SpO2 Readings from Last 6 Encounters:   06/06/19 96%   05/27/19 95%   05/20/19 99%   05/10/19 92%   10/12/18 96%   09/28/18 95%            Intake/Output Summary (Last 24 hours) at 6/6/2019 1434  Last data filed at 6/6/2019 1415  Gross per 24 hour   Intake 240 ml   Output 1875 ml   Net -1635 ml        Physical Exam:    Gen:  Well-developed, well-nourished, in no acute distress  HEENT:  Pink conjunctivae, PERRL, hearing intact to voice, moist mucous membranes  Neck:  Supple, without masses, thyroid non-tender  Resp:  No accessory muscle use, clear breath sounds without wheezes rales or rhonchi  Card:  No murmurs, normal S1, S2 without thrills, bruits or peripheral edema  Abd:  Soft, non-tender, non-distended, normoactive bowel sounds are present, no palpable organomegaly and no detectable hernias  Lymph:  No cervical or inguinal adenopathy  Musc:  No cyanosis or clubbing  Skin:  LT foot ulcer   Neuro:  Cranial nerves are grossly intact, LT side weakness  Psych:  Good insight, oriented to person, place and time, alert  __________________________________________________________________  Medications Reviewed: (see below)  Medications:     Current Facility-Administered Medications   Medication Dose Route Frequency    sodium hypochlorite (QUARTER STRENGTH DAKIN'S) 0.125% irrigation (bottle)   Topical EVERY OTHER DAY    polyethylene glycol (MIRALAX) packet 17 g  17 g Oral DAILY    acetaminophen (TYLENOL) tablet 650 mg  650 mg Oral Q4H PRN    melatonin tablet 3 mg  3 mg Oral QHS PRN    methadone (DOLOPHINE) 10 mg/mL concentrated solution 130 mg  130 mg Oral DAILY    colchicine tablet 0.6 mg  0.6 mg Oral DAILY PRN    lisinopril (PRINIVIL, ZESTRIL) tablet 40 mg  40 mg Oral DAILY    amLODIPine (NORVASC) tablet 10 mg  10 mg Oral DAILY    senna-docusate (PERICOLACE) 8.6-50 mg per tablet 1 Tab  1 Tab Oral DAILY    sertraline (ZOLOFT) tablet 50 mg  50 mg Oral DAILY    allopurinol (ZYLOPRIM) tablet 100 mg  100 mg Oral DAILY    tamsulosin (FLOMAX) capsule 0.4 mg  0.4 mg Oral DAILY    nicotine (NICODERM CQ) 21 mg/24 hr patch 1 Patch  1 Patch TransDERmal DAILY PRN    OLANZapine (ZyPREXA) tablet 5 mg  5 mg Oral Q6H PRN    sodium chloride (NS) flush 5-40 mL  5-40 mL IntraVENous Q8H    sodium chloride (NS) flush 5-40 mL  5-40 mL IntraVENous PRN    diphenhydrAMINE (BENADRYL) capsule 25 mg  25 mg Oral Q4H PRN    ondansetron (ZOFRAN) injection 4 mg  4 mg IntraVENous Q4H PRN    enoxaparin (LOVENOX) injection 40 mg  40 mg SubCUTAneous Q24H    cefTRIAXone (ROCEPHIN) 2 g in 0.9% sodium chloride (MBP/ADV) 50 mL  2 g IntraVENous Q24H    TIGEcycline (TYGACIL) 50 mg in 0.9% sodium chloride (MBP/ADV) 100 mL  50 mg IntraVENous Q12H    pantoprazole (PROTONIX) tablet 40 mg  40 mg Oral ACB    alum-mag hydroxide-simeth (MYLANTA) oral suspension 30 mL  30 mL Oral Q4H PRN    levothyroxine (SYNTHROID) tablet 100 mcg  100 mcg Oral ACB    oxyCODONE IR (ROXICODONE) tablet 10 mg  10 mg Oral Q6H PRN        Lab Data Reviewed: (see below)  Lab Review:     No results for input(s): WBC, HGB, HCT, PLT, HGBEXT, HCTEXT, PLTEXT, HGBEXT, HCTEXT, PLTEXT in the last 72 hours. No results for input(s): NA, K, CL, CO2, GLU, BUN, CREA, CA, MG, PHOS, ALB, TBIL, TBILI, SGOT, ALT, INR in the last 72 hours.     No lab exists for component: INREXT, INREXT  Lab Results   Component Value Date/Time    Glucose (POC) 150 (H) 03/19/2018 11:45 AM Glucose (POC) 123 (H) 03/18/2018 08:55 PM    Glucose (POC) 96 03/16/2018 04:58 PM    Glucose (POC) 116 (H) 12/12/2012 12:42 PM    Glucose (POC) 99 12/11/2012 11:27 PM     No results for input(s): PH, PCO2, PO2, HCO3, FIO2 in the last 72 hours. No results for input(s): INR in the last 72 hours. No lab exists for component: INREXT, INREXT  All Micro Results     Procedure Component Value Units Date/Time    CULTURE, BLOOD, PAIRED [[id number]] Collected:  05/30/19 0000    Order Status:  Completed Specimen:  Blood Updated:  06/04/19 0508     Special Requests: NO SPECIAL REQUESTS        Culture result: NO GROWTH 5 DAYS       CULTURE, WOUND Jeanne Jeb STAIN [[id number]]  (Abnormal)  (Susceptibility) Collected:  05/30/19 1500    Order Status:  Completed Specimen:  Foot Updated:  06/03/19 0959     Special Requests: NO SPECIAL REQUESTS        GRAM STAIN NO WBC'S SEEN         3+ GRAM NEGATIVE RODS               2+ GRAM POSITIVE COCCOBACILLI           Culture result: HEAVY DIPHTHEROIDS               LIGHT STENOTROPHOMONAS ([employer] MALTOPHILIA CLSI GUIDELINES DO NOT RECOMMEND REPORTING SUSCEPTIBILITIES FOR S. MALTOPHILIA. TRIMETHOPRIM/SULFAMETHOXAZOLE IS THE DRUG OF CHOICE. FEW PROTEUS MIRABILIS         FEW KLEBSIELLA PNEUMONIAE       CULTURE, [facility] STAIN [[id number]]  (Abnormal)  (Susceptibility) Collected:  05/30/19 0141    Order Status:  Completed Specimen:  Wound Drainage Updated:  06/02/19 0920     Special Requests: NO SPECIAL REQUESTS        GRAM STAIN NO WBC'S SEEN               OCCASIONAL APPARENT GRAM NEGATIVE RODS           Culture result: LIGHT PROTEUS MIRABILIS               LIGHT STENOTROPHOMONAS ([employer] MALTOPHILIA CLSI GUIDELINES DO NOT RECOMMEND REPORTING SUSCEPTIBILITIES FOR S. MALTOPHILIA. TRIMETHOPRIM/SULFAMETHOXAZOLE IS THE DRUG OF CHOICE. HEAVY MIXED SKIN YULIA ISOLATED                I have reviewed notes of prior 24hr. Other pertinent lab:       Total time spent with patient: 32 min                  Care Plan discussed with: Patient, Nursing Staff, Consultant/Specialist and >50% of time spent in counseling and coordination of care    Discussed:  Care Plan    Prophylaxis:  Lovenox    Disposition:  Home w/Family with [facility]           ___________________________________________________    Attending Physician: Stephan Mayberry DO

## 2019-06-06 NOTE — PROGRESS NOTES
Bedside and Verbal shift change report given to [facility] (oncoming nurse) by Yohan Carlos (offgoing nurse). Report included the following information SBAR, Kardex, Intake/Output, MAR and Recent Results.

## 2019-06-06 NOTE — PROGRESS NOTES
Bedside shift change report given to Mustapha Martin (oncoming nurse) by Niranjan Eller (offgoing nurse). Report included the following information SBAR, Kardex, MAR and Recent Results.

## 2019-06-06 NOTE — PROGRESS NOTES
[facility] Progress Note           Alcides Alegria DO    [phone] Office  [phone]  Fax    2019      Assessment & Plan:   1. Chronic left foot wound status post incision and drainage on 2019. Previous cultures grew Stenotrophomonas maltophilia, Enterococcus, and Providencia. Repeat wound cultures growing SMALT, proteus, and klebsiella. Currently on tigecycline and ceftriaxone. Unfortunately due to patients allergies there are no PO abx options other than omadacycline (new tetracycline abx) which insurance will not cover due to cost. D/w Dr Lor Snell. Recommend o/p follow up with [facility] for wound care/compression and follow up with Dr Lor Snell in office for saphenous vein ablation (which cannot be done in hospital due to lack of equipment) Continue tigecycline/ceftriaxone through Monday 6/10  2. Multiple antibiotic allergies including vancomycin, sulfa, and ciprofloxacin. 3. History of alcohol abuse. 4. Chronic pain. The patient is on methadone for chronic pain. He denies a history of drug abuse. 5. History of bladder cancer. 6. History of hemorrhagic stroke with left-sided hemiparesis.                Subjective:     Leg pain improving. Objective:     Vitals:   Visit Vitals  /68 (BP 1 Location: Right arm, BP Patient Position: At rest)   Pulse (!) 55   Temp 97.5 °F (36.4 °C)   Resp 18   Ht 6' 1\" (1.854 m)   Wt 108.2 kg (238 lb 9.6 oz)   SpO2 96%   BMI 31.48 kg/m²        Tmax:  Temp (24hrs), Av.8 °F (36.6 °C), Min:97.3 °F (36.3 °C), Max:98.2 °F (36.8 °C)      Exam:   Patient is intubated:  no    Physical Examination:   General:  Alert, cooperative, no distress   Head:  Normocephalic, atraumatic. Eyes:  Conjunctivae clear   Neck: Supple       Lungs:   No distress. Chest wall:     Heart:     Abdomen:      Extremities: Moves all. Foot dressed   Skin:  No rash   Neurologic: CNII-XII intact.  Normal strength     Labs:        No lab exists for component: ITNL   No results for input(s): CPK, CKMB, TROIQ in the last 72 hours. No results for input(s): NA, K, CL, CO2, BUN, CREA, GLU, PHOS, MG, ALB, WBC, HGB, HCT, PLT, HGBEXT, HCTEXT, PLTEXT, HGBEXT, HCTEXT, PLTEXT in the last 72 hours. No lab exists for component:  CA  No results for input(s): INR, PTP, APTT in the last 72 hours. No lab exists for component: INREXT, INREXT  Needs: urine analysis, urine sodium, protein and creatinine  No results found for: VIVEK, CREAU      Cultures:     Lab Results   Component Value Date/Time    Specimen Description: BLOOD 02/04/2010 10:55 PM    Specimen Description: BLOOD 02/12/2009 11:30 PM     Lab Results   Component Value Date/Time    Culture result: HEAVY DIPHTHEROIDS (A) 05/30/2019 03:00 PM    Culture result: (A) 05/30/2019 03:00 PM     LIGHT STENOTROPHOMONAS (Gaynelle Abe.) MALTOPHILIA CLSI GUIDELINES DO NOT RECOMMEND REPORTING SUSCEPTIBILITIES FOR S. MALTOPHILIA. TRIMETHOPRIM/SULFAMETHOXAZOLE IS THE DRUG OF CHOICE.     Culture result: FEW PROTEUS MIRABILIS (A) 05/30/2019 03:00 PM    Culture result: FEW KLEBSIELLA PNEUMONIAE (A) 05/30/2019 03:00 PM       Radiology:     Medications       Current Facility-Administered Medications   Medication Dose Route Frequency Last Dose    sodium hypochlorite (QUARTER STRENGTH DAKIN'S) 0.125% irrigation (bottle)   Topical EVERY OTHER DAY      polyethylene glycol (MIRALAX) packet 17 g  17 g Oral DAILY 17 g at 06/05/19 0802    acetaminophen (TYLENOL) tablet 650 mg  650 mg Oral Q4H  mg at 06/06/19 0859    melatonin tablet 3 mg  3 mg Oral QHS PRN      methadone (DOLOPHINE) 10 mg/mL concentrated solution 130 mg  130 mg Oral DAILY 130 mg at 06/06/19 0859    colchicine tablet 0.6 mg  0.6 mg Oral DAILY PRN 0.6 mg at 06/06/19 0927    lisinopril (PRINIVIL, ZESTRIL) tablet 40 mg  40 mg Oral DAILY 40 mg at 06/06/19 0859    amLODIPine (NORVASC) tablet 10 mg  10 mg Oral DAILY 10 mg at 06/06/19 0859    obdulia (Jimy Allan) 8.6-50 mg per tablet 1 Tab  1 Tab Oral DAILY 1 Tab at 06/05/19 0801    sertraline (ZOLOFT) tablet 50 mg  50 mg Oral DAILY 50 mg at 06/06/19 0859    allopurinol (ZYLOPRIM) tablet 100 mg  100 mg Oral DAILY 100 mg at 06/02/19 0900    tamsulosin (FLOMAX) capsule 0.4 mg  0.4 mg Oral DAILY 0.4 mg at 06/06/19 0857    nicotine (NICODERM CQ) 21 mg/24 hr patch 1 Patch  1 Patch TransDERmal DAILY PRN      OLANZapine (ZyPREXA) tablet 5 mg  5 mg Oral Q6H PRN      sodium chloride (NS) flush 5-40 mL  5-40 mL IntraVENous Q8H 10 mL at 06/05/19 1433    sodium chloride (NS) flush 5-40 mL  5-40 mL IntraVENous PRN 10 mL at 06/02/19 0419    diphenhydrAMINE (BENADRYL) capsule 25 mg  25 mg Oral Q4H PRN      ondansetron (ZOFRAN) injection 4 mg  4 mg IntraVENous Q4H PRN 4 mg at 06/03/19 0653    enoxaparin (LOVENOX) injection 40 mg  40 mg SubCUTAneous Q24H 40 mg at 06/06/19 0321    cefTRIAXone (ROCEPHIN) 2 g in 0.9% sodium chloride (MBP/ADV) 50 mL  2 g IntraVENous Q24H 2 g at 06/05/19 2216    TIGEcycline (TYGACIL) 50 mg in 0.9% sodium chloride (MBP/ADV) 100 mL  50 mg IntraVENous Q12H 50 mg at 06/06/19 0321    pantoprazole (PROTONIX) tablet 40 mg  40 mg Oral ACB 40 mg at 06/06/19 0635    alum-mag hydroxide-simeth (MYLANTA) oral suspension 30 mL  30 mL Oral Q4H PRN 30 mL at 05/31/19 0405    levothyroxine (SYNTHROID) tablet 100 mcg  100 mcg Oral  mcg at 06/06/19 0635    oxyCODONE IR (ROXICODONE) tablet 10 mg  10 mg Oral Q6H PRN 10 mg at 06/06/19 3831           Case discussed with:SHERRI MUNGUIA DO

## 2019-06-06 NOTE — PROGRESS NOTES
Pt requested to have dsg change after his Gabapentin. Dsg change to be done about an hour after med. Returned to do dsg change, pt was eating dinner. Returned at 1900 to do dsg change pt declined at this time, he is ambulating to  for a BM. Pt requested RN to return in about 40 min. All dsg supplies are in the pt's room. 1925  Pt had BM and is finishing his meal. Oncoming RN at bedside.

## 2019-06-07 LAB
ANION GAP SERPL CALC-SCNC: 5 MMOL/L (ref 5–15)
BUN SERPL-MCNC: 22 MG/DL (ref 6–20)
BUN/CREAT SERPL: 29 (ref 12–20)
CALCIUM SERPL-MCNC: 8.6 MG/DL (ref 8.5–10.1)
CHLORIDE SERPL-SCNC: 103 MMOL/L (ref 97–108)
CO2 SERPL-SCNC: 29 MMOL/L (ref 21–32)
CREAT SERPL-MCNC: 0.76 MG/DL (ref 0.7–1.3)
ERYTHROCYTE [DISTWIDTH] IN BLOOD BY AUTOMATED COUNT: 14.2 % (ref 11.5–14.5)
GLUCOSE SERPL-MCNC: 100 MG/DL (ref 65–100)
HCT VFR BLD AUTO: 33.9 % (ref 36.6–50.3)
HGB BLD-MCNC: 10.8 G/DL (ref 12.1–17)
MAGNESIUM SERPL-MCNC: 2 MG/DL (ref 1.6–2.4)
MCH RBC QN AUTO: 28.3 PG (ref 26–34)
MCHC RBC AUTO-ENTMCNC: 31.9 G/DL (ref 30–36.5)
MCV RBC AUTO: 88.7 FL (ref 80–99)
NRBC # BLD: 0 K/UL (ref 0–0.01)
NRBC BLD-RTO: 0 PER 100 WBC
PHOSPHATE SERPL-MCNC: 4.2 MG/DL (ref 2.6–4.7)
PLATELET # BLD AUTO: 305 K/UL (ref 150–400)
PMV BLD AUTO: 10.1 FL (ref 8.9–12.9)
POTASSIUM SERPL-SCNC: 4.2 MMOL/L (ref 3.5–5.1)
RBC # BLD AUTO: 3.82 M/UL (ref 4.1–5.7)
SODIUM SERPL-SCNC: 137 MMOL/L (ref 136–145)
WBC # BLD AUTO: 8.1 K/UL (ref 4.1–11.1)

## 2019-06-07 PROCEDURE — 65270000029 HC RM PRIVATE

## 2019-06-07 PROCEDURE — 74011250636 HC RX REV CODE- 250/636: Performed by: INTERNAL MEDICINE

## 2019-06-07 PROCEDURE — 74011250637 HC RX REV CODE- 250/637: Performed by: INTERNAL MEDICINE

## 2019-06-07 PROCEDURE — 74011000258 HC RX REV CODE- 258: Performed by: INTERNAL MEDICINE

## 2019-06-07 PROCEDURE — 80048 BASIC METABOLIC PNL TOTAL CA: CPT

## 2019-06-07 PROCEDURE — 84100 ASSAY OF PHOSPHORUS: CPT

## 2019-06-07 PROCEDURE — 36415 COLL VENOUS BLD VENIPUNCTURE: CPT

## 2019-06-07 PROCEDURE — 83735 ASSAY OF MAGNESIUM: CPT

## 2019-06-07 PROCEDURE — 97116 GAIT TRAINING THERAPY: CPT

## 2019-06-07 PROCEDURE — 85027 COMPLETE CBC AUTOMATED: CPT

## 2019-06-07 RX ADMIN — CEFTRIAXONE 2 G: 2 INJECTION, POWDER, FOR SOLUTION INTRAMUSCULAR; INTRAVENOUS at 20:28

## 2019-06-07 RX ADMIN — SERTRALINE HYDROCHLORIDE 50 MG: 50 TABLET ORAL at 10:22

## 2019-06-07 RX ADMIN — Medication 10 ML: at 06:00

## 2019-06-07 RX ADMIN — OXYCODONE HYDROCHLORIDE 10 MG: 5 TABLET ORAL at 06:30

## 2019-06-07 RX ADMIN — METHADONE HYDROCHLORIDE 130 MG: 10 CONCENTRATE ORAL at 10:21

## 2019-06-07 RX ADMIN — ACETAMINOPHEN 650 MG: 325 TABLET ORAL at 03:06

## 2019-06-07 RX ADMIN — Medication 10 ML: at 15:23

## 2019-06-07 RX ADMIN — Medication 10 ML: at 22:17

## 2019-06-07 RX ADMIN — TAMSULOSIN HYDROCHLORIDE 0.4 MG: 0.4 CAPSULE ORAL at 10:23

## 2019-06-07 RX ADMIN — ENOXAPARIN SODIUM 40 MG: 40 INJECTION SUBCUTANEOUS at 02:58

## 2019-06-07 RX ADMIN — SODIUM CHLORIDE 50 MG: 900 INJECTION, SOLUTION INTRAVENOUS at 16:36

## 2019-06-07 RX ADMIN — GABAPENTIN 300 MG: 100 CAPSULE ORAL at 22:13

## 2019-06-07 RX ADMIN — GABAPENTIN 300 MG: 100 CAPSULE ORAL at 10:23

## 2019-06-07 RX ADMIN — LEVOTHYROXINE SODIUM 100 MCG: 100 TABLET ORAL at 06:30

## 2019-06-07 RX ADMIN — POLYETHYLENE GLYCOL 3350 17 G: 17 POWDER, FOR SOLUTION ORAL at 10:23

## 2019-06-07 RX ADMIN — MELATONIN TAB 3 MG 3 MG: 3 TAB at 00:00

## 2019-06-07 RX ADMIN — LISINOPRIL 40 MG: 20 TABLET ORAL at 10:22

## 2019-06-07 RX ADMIN — OXYCODONE HYDROCHLORIDE 10 MG: 5 TABLET ORAL at 20:26

## 2019-06-07 RX ADMIN — OXYCODONE HYDROCHLORIDE 10 MG: 5 TABLET ORAL at 14:40

## 2019-06-07 RX ADMIN — ALLOPURINOL 100 MG: 100 TABLET ORAL at 10:22

## 2019-06-07 RX ADMIN — AMLODIPINE BESYLATE 10 MG: 5 TABLET ORAL at 10:22

## 2019-06-07 RX ADMIN — SODIUM CHLORIDE 50 MG: 900 INJECTION, SOLUTION INTRAVENOUS at 03:30

## 2019-06-07 RX ADMIN — GABAPENTIN 300 MG: 100 CAPSULE ORAL at 15:23

## 2019-06-07 RX ADMIN — PANTOPRAZOLE SODIUM 40 MG: 40 TABLET, DELAYED RELEASE ORAL at 06:30

## 2019-06-07 NOTE — ROUTINE PROCESS
Bedside and Verbal shift change report given to EMERY Thompson (oncoming nurse) by Dov Huff RN (offgoing nurse). Report included the following information SBAR, Kardex and Quality Measures.

## 2019-06-07 NOTE — PROGRESS NOTES
06/07/19 1:04 PM  Patient declined at [facility], as their facility is full and do not have an available bed. Patient declined at [facility]. 06/07/19 11:58 AM  Additional referrals sent to [address] [place] [address] per patient's request.  Choice letter updated. 06/07/19 11:35 AM  CM spoke with patient regarding SNF. Patient agreeable, as he understands that he will not be safe at home in his current condition and needs rehab. CM provided list to patient, patient would like to discuss placement options with his brother, which is who he lives with. Discussed limiting factors of patient's insurance, as it is a Medicaid HMO and relatively new, unsure of which facilities are in network. Will discuss options with brother and patient also agreeable to CM sending to the [address] [place] [address] [employer] [facility] and [facility] to begin search. Choice letter signed.   JUVENCIO Moctezuma

## 2019-06-07 NOTE — PROGRESS NOTES
Spiritual Care Assessment/Progress Note  [address]      NAME: Nba Lund      MRN: [id number]  AGE: 46 y.o.  SEX: male  Spiritism Affiliation: Jainism   Language: English     6/7/2019     Total Time (in minutes): 15     Spiritual Assessment begun in [facility]  MED SURG 2 through conversation with:         [x]Patient        [] Family    [] Friend(s)        Reason for Consult: Initial/Spiritual assessment, patient floor     Spiritual beliefs: (Please include comment if needed)     [x] Identifies with a kendy tradition: Jainism        [] Supported by a kendy community:            [] Claims no spiritual orientation:           [] Seeking spiritual identity:                [] Adheres to an individual form of spirituality:           [] Not able to assess:                           Identified resources for coping:      [x] Prayer                               [] Music                  [] Guided Imagery     [] Family/friends                 [] Pet visits     [x] Devotional reading                         [] Unknown     [] Other:                                               Interventions offered during this visit: (See comments for more details)    Patient Interventions: Affirmation of emotions/emotional suffering, Coping skills reviewed/reinforced, Spiritism beliefs/image of God discussed, Bible or other spiritual literature provided           Plan of Care:     [x] Support spiritual and/or cultural needs    [] Support AMD and/or advance care planning process      [] Support grieving process   [] Coordinate Rites and/or Rituals    [] Coordination with community clergy   [] No spiritual needs identified at this time   [] Detailed Plan of Care below (See Comments)  [] Make referral to Music Therapy  [] Make referral to Pet Therapy     [] Make referral to Addiction services  [] Make referral to [facility]  [] Make referral to Spiritual Care Partner  [] No future visits requested        [x] Follow up visits as needed     Comments:    visited patient, Pili Wick, for an initial spiritual assessment on the [facility] Surgical unit. Pili Wick was awake, alert, and sitting up in bed.  introduced herself and extended support through active listening and pastoral presence. Pili Wick openly discussed his health concerns, provided life and history review, and also discussed his kendy. Currently Pili Wick is waiting to hear about which Rehabilitation facility he will be going to.  provided a supportive presence as Pili Wick expressed his frustrations and concerns and also engaged in story telling.  inquired what was giving Pili Wick strength during this time. Pili Wick expressed that his kendy and prayer was helpful for him. Pili Wick requested a Bible and  provided one as requested.  inquired how she could best support Pili Wick during this time. Pili Wick expressed that he had no other needs and thanked the  for her visit. He is aware of the 's availability.  will follow up as able and/or needed. Ry Gallardo. Devika Lewis.      Paging Service: [phone])

## 2019-06-07 NOTE — PROGRESS NOTES
[facility] Progress Note           Efren Bowen DO    [phone] Office  [phone]  Fax    2019      Assessment & Plan:   1. Chronic left foot wound status post incision and drainage on 2019. Previous cultures grew Stenotrophomonas maltophilia, Enterococcus, and Providencia. Repeat wound cultures growing SMALT, proteus, and klebsiella. Currently on tigecycline and ceftriaxone. Unfortunately due to patients allergies there are no PO abx options other than omadacycline (new tetracycline abx) which insurance will not cover due to cost. D/w Dr Laraine Libman. Recommend o/p follow up with [facility] for wound care/compression and follow up with Dr Laraine Libman in office for saphenous vein ablation (which cannot be done in hospital due to lack of equipment) Continue tigecycline/ceftriaxone through Monday 6/10  2. Multiple antibiotic allergies including vancomycin, sulfa, and ciprofloxacin. 3. History of alcohol abuse. 4. Chronic pain. The patient is on methadone for chronic pain. He denies a history of drug abuse. 5. History of bladder cancer. 6. History of hemorrhagic stroke with left-sided hemiparesis.                Subjective:     Leg pain improving. Objective:     Vitals:   Visit Vitals  /81 (BP 1 Location: Right arm, BP Patient Position: Sitting)   Pulse 60   Temp 97.5 °F (36.4 °C)   Resp 18   Ht 6' 1\" (1.854 m)   Wt 108.2 kg (238 lb 9.6 oz)   SpO2 96%   BMI 31.48 kg/m²        Tmax:  Temp (24hrs), Av.9 °F (36.6 °C), Min:97.5 °F (36.4 °C), Max:98.4 °F (36.9 °C)      Exam:   Patient is intubated:  no    Physical Examination:   General:  Alert, cooperative, no distress   Head:  Normocephalic, atraumatic. Eyes:  Conjunctivae clear   Neck: Supple       Lungs:   No distress. Chest wall:     Heart:     Abdomen:      Extremities: Moves all. Foot dressed   Skin:  No rash   Neurologic: CNII-XII intact.  Normal strength     Labs:        No lab exists for component: ITNL   No results for input(s): CPK, CKMB, TROIQ in the last 72 hours. Recent Labs     06/07/19  0417      K 4.2      CO2 29   BUN 22*   CREA 0.76      PHOS 4.2   MG 2.0   WBC 8.1   HGB 10.8*   HCT 33.9*        No results for input(s): INR, PTP, APTT in the last 72 hours. No lab exists for component: INREXT, INREXT  Needs: urine analysis, urine sodium, protein and creatinine  No results found for: VIVEK, CREAU      Cultures:     Lab Results   Component Value Date/Time    Specimen Description: BLOOD 02/04/2010 10:55 PM    Specimen Description: BLOOD 02/12/2009 11:30 PM     Lab Results   Component Value Date/Time    Culture result: HEAVY DIPHTHEROIDS (A) 05/30/2019 03:00 PM    Culture result: (A) 05/30/2019 03:00 PM     LIGHT STENOTROPHOMONAS ([place] MALTOPHILIA CLSI GUIDELINES DO NOT RECOMMEND REPORTING SUSCEPTIBILITIES FOR S. MALTOPHILIA. TRIMETHOPRIM/SULFAMETHOXAZOLE IS THE DRUG OF CHOICE.     Culture result: FEW PROTEUS MIRABILIS (A) 05/30/2019 03:00 PM    Culture result: FEW KLEBSIELLA PNEUMONIAE (A) 05/30/2019 03:00 PM       Radiology:     Medications       Current Facility-Administered Medications   Medication Dose Route Frequency Last Dose    gabapentin (NEURONTIN) capsule 300 mg  300 mg Oral  mg at 06/07/19 1023    sodium hypochlorite (QUARTER STRENGTH DAKIN'S) 0.125% irrigation (bottle)   Topical EVERY OTHER DAY      polyethylene glycol (MIRALAX) packet 17 g  17 g Oral DAILY 17 g at 06/07/19 1023    acetaminophen (TYLENOL) tablet 650 mg  650 mg Oral Q4H  mg at 06/07/19 0306    melatonin tablet 3 mg  3 mg Oral QHS PRN 3 mg at 06/07/19 0000    methadone (DOLOPHINE) 10 mg/mL concentrated solution 130 mg  130 mg Oral DAILY 130 mg at 06/07/19 1021    colchicine tablet 0.6 mg  0.6 mg Oral DAILY PRN 0.6 mg at 06/06/19 0927    lisinopril (PRINIVIL, ZESTRIL) tablet 40 mg  40 mg Oral DAILY 40 mg at 06/07/19 1022    amLODIPine (NORVASC) tablet 10 mg  10 mg Oral DAILY 10 mg at 06/07/19 1022    senna-docusate (PERICOLACE) 8.6-50 mg per tablet 1 Tab  1 Tab Oral DAILY 1 Tab at 06/05/19 0801    sertraline (ZOLOFT) tablet 50 mg  50 mg Oral DAILY 50 mg at 06/07/19 1022    allopurinol (ZYLOPRIM) tablet 100 mg  100 mg Oral DAILY 100 mg at 06/07/19 1022    tamsulosin (FLOMAX) capsule 0.4 mg  0.4 mg Oral DAILY 0.4 mg at 06/07/19 1023    nicotine (NICODERM CQ) 21 mg/24 hr patch 1 Patch  1 Patch TransDERmal DAILY PRN      OLANZapine (ZyPREXA) tablet 5 mg  5 mg Oral Q6H PRN      sodium chloride (NS) flush 5-40 mL  5-40 mL IntraVENous Q8H 10 mL at 06/07/19 0600    sodium chloride (NS) flush 5-40 mL  5-40 mL IntraVENous PRN 10 mL at 06/02/19 0419    diphenhydrAMINE (BENADRYL) capsule 25 mg  25 mg Oral Q4H PRN      ondansetron (ZOFRAN) injection 4 mg  4 mg IntraVENous Q4H PRN 4 mg at 06/03/19 0653    enoxaparin (LOVENOX) injection 40 mg  40 mg SubCUTAneous Q24H 40 mg at 06/07/19 0258    cefTRIAXone (ROCEPHIN) 2 g in 0.9% sodium chloride (MBP/ADV) 50 mL  2 g IntraVENous Q24H 2 g at 06/06/19 2340    TIGEcycline (TYGACIL) 50 mg in 0.9% sodium chloride (MBP/ADV) 100 mL  50 mg IntraVENous Q12H 50 mg at 06/07/19 0330    pantoprazole (PROTONIX) tablet 40 mg  40 mg Oral ACB 40 mg at 06/07/19 0630    alum-mag hydroxide-simeth (MYLANTA) oral suspension 30 mL  30 mL Oral Q4H PRN 30 mL at 05/31/19 0405    levothyroxine (SYNTHROID) tablet 100 mcg  100 mcg Oral  mcg at 06/07/19 0630    oxyCODONE IR (ROXICODONE) tablet 10 mg  10 mg Oral Q6H PRN 10 mg at 06/07/19 0630           Case discussed with:SHERRI MUNGUIA DO

## 2019-06-07 NOTE — PROGRESS NOTES
Bedside and Verbal shift change report given to Dion Nava (oncoming nurse) by Kar Cole (offgoing nurse). Report included the following information SBAR, Kardex, Intake/Output, MAR, Accordion and Recent Results.

## 2019-06-07 NOTE — PROGRESS NOTES
[facility]  [address]  [phone]      Medical Progress Note      NAME: Lynn Trinidad   :  1965  MRM:  [id number]    Date/Time: 2019         Subjective:     Chief Complaint:  Patient was seen and examined by me. Chart reviewed. Denied fevers, chills. Objective:       Vitals:       Last 24hrs VS reviewed since prior progress note.  Most recent are:    Visit Vitals  /78 (BP 1 Location: Right arm, BP Patient Position: Sitting)   Pulse 63   Temp 98.1 °F (36.7 °C)   Resp 18   Ht 6' 1\" (1.854 m)   Wt 108.2 kg (238 lb 9.6 oz)   SpO2 94%   BMI 31.48 kg/m²     SpO2 Readings from Last 6 Encounters:   19 94%   19 95%   19 99%   05/10/19 92%   10/12/18 96%   18 95%            Intake/Output Summary (Last 24 hours) at 2019 1433  Last data filed at 2019 1803  Gross per 24 hour   Intake 680 ml   Output 1360 ml   Net -680 ml        Exam:     Physical Exam:    Gen:  Disheveled, ill-appearing, obese, NAD  HEENT:  Pink conjunctivae, PERRL, hearing intact to voice, moist mucous membranes  Neck:  Supple, without masses, thyroid non-tender  Resp:  No accessory muscle use, clear breath sounds without wheezes rales or rhonchi  Card:  No murmurs, normal S1, S2 without thrills, trace edema  Abd:  Soft, non-tender, non-distended, normoactive bowel sounds are present  Musc:  No cyanosis or clubbing  Skin:  L foot dressing c/d/i  Neuro:  Cranial nerves 3-12 are grossly intact, chronic L sided weakness, follows commands appropriately  Psych:  Fair insight, oriented to person, place and time, alert    Medications Reviewed: (see below)    Lab Data Reviewed: (see below)    ______________________________________________________________________    Medications:     Current Facility-Administered Medications   Medication Dose Route Frequency    gabapentin (NEURONTIN) capsule 300 mg  300 mg Oral TID    sodium hypochlorite (QUARTER STRENGTH DAKIN'S) 0.125% irrigation (bottle)   Topical EVERY OTHER DAY    polyethylene glycol (MIRALAX) packet 17 g  17 g Oral DAILY    acetaminophen (TYLENOL) tablet 650 mg  650 mg Oral Q4H PRN    melatonin tablet 3 mg  3 mg Oral QHS PRN    methadone (DOLOPHINE) 10 mg/mL concentrated solution 130 mg  130 mg Oral DAILY    colchicine tablet 0.6 mg  0.6 mg Oral DAILY PRN    lisinopril (PRINIVIL, ZESTRIL) tablet 40 mg  40 mg Oral DAILY    amLODIPine (NORVASC) tablet 10 mg  10 mg Oral DAILY    senna-docusate (PERICOLACE) 8.6-50 mg per tablet 1 Tab  1 Tab Oral DAILY    sertraline (ZOLOFT) tablet 50 mg  50 mg Oral DAILY    allopurinol (ZYLOPRIM) tablet 100 mg  100 mg Oral DAILY    tamsulosin (FLOMAX) capsule 0.4 mg  0.4 mg Oral DAILY    nicotine (NICODERM CQ) 21 mg/24 hr patch 1 Patch  1 Patch TransDERmal DAILY PRN    OLANZapine (ZyPREXA) tablet 5 mg  5 mg Oral Q6H PRN    sodium chloride (NS) flush 5-40 mL  5-40 mL IntraVENous Q8H    sodium chloride (NS) flush 5-40 mL  5-40 mL IntraVENous PRN    diphenhydrAMINE (BENADRYL) capsule 25 mg  25 mg Oral Q4H PRN    ondansetron (ZOFRAN) injection 4 mg  4 mg IntraVENous Q4H PRN    enoxaparin (LOVENOX) injection 40 mg  40 mg SubCUTAneous Q24H    cefTRIAXone (ROCEPHIN) 2 g in 0.9% sodium chloride (MBP/ADV) 50 mL  2 g IntraVENous Q24H    TIGEcycline (TYGACIL) 50 mg in 0.9% sodium chloride (MBP/ADV) 100 mL  50 mg IntraVENous Q12H    pantoprazole (PROTONIX) tablet 40 mg  40 mg Oral ACB    alum-mag hydroxide-simeth (MYLANTA) oral suspension 30 mL  30 mL Oral Q4H PRN    levothyroxine (SYNTHROID) tablet 100 mcg  100 mcg Oral ACB    oxyCODONE IR (ROXICODONE) tablet 10 mg  10 mg Oral Q6H PRN          Lab Review:     Recent Labs     06/07/19  0417   WBC 8.1   HGB 10.8*   HCT 33.9*        Recent Labs     06/07/19  0417      K 4.2      CO2 29      BUN 22*   CREA 0.76   CA 8.6   MG 2.0   PHOS 4.2     Lab Results   Component Value Date/Time    Glucose (POC) 150 (H) 03/19/2018 11:45 AM    Glucose (POC) 123 (H) 03/18/2018 08:55 PM    Glucose (POC) 96 03/16/2018 04:58 PM    Glucose (POC) 116 (H) 12/12/2012 12:42 PM    Glucose (POC) 99 12/11/2012 11:27 PM          Assessment / Plan: Active Problems:    47 yo hx of HTN, CVA w/ L hemiparesis, bladder CA, depression, chronic pain, presented w/ L foot wound infection    1) Acute on chronic L foot wound infection: s/p I&D by podiatry on 05/25. Wound cx with stenotrophomonas, proteus, klebsiella. Cont IV CTX/tigecycline until 06/10. Patient will then f/u with [facility] wound center and with Dr. Jaime Nava (Vascular) for saphenous vein ablation    2) HTN: cont norvasc, lisinopril    3) Hypothyroid: TSH elevated but patient was non-compliance. Will cont synthroid. Repeat TFTs in 4 weeks    4) Chronic pain/depression: cont methadone, SSRI    5) Hx of bladder CA: s/p surgery in 04/2018 at [place]. Will need to f/u with his Urologist.  Cont flomax    6) Hx of hemorrhagic CVA w/ L sided hemiparesis: cont PT/OT.   Awaiting SNF    Total time spent with patient: 20 min                  Care Plan discussed with: Patient, nursing    Discussed:  Care Plan    Prophylaxis:  Lovenox    Disposition:  SNF/LTC           ___________________________________________________    Attending Physician: Bita Austin MD

## 2019-06-07 NOTE — PROGRESS NOTES
PHYSICAL THERAPY TREATMENT  Patient: Kelly Bah (40 y.o. male)  Date: 6/7/2019  Diagnosis: Open wound, lower leg [S81.011I] <principal problem not specified>       Precautions:    Chart, physical therapy assessment, plan of care and goals were reviewed. ASSESSMENT:     Pt was premedicated, agreeable to treatment and apologizing for becoming agitated yesterday during therapy. Pt deferring LE exercises and preferred to ambulate a short distance in the room. Pt is at Supervision for transfers, ambulated 15 feet with SPC at [place] [address] with increase weight bearing RLE to off-load LLE. Pt stood at the sink for approximately 2 minutes for hygiene and returned to EOB. Pt with antalgic gt pattern, without LOB, distance limited due to pain level. Pt with good effort, continues with significant pain complaints in spite of receiving pain medication. Progression toward goals:  ?    Improving appropriately and progressing toward goals  ? Improving slowly and progressing toward goals  ? Not making progress toward goals and plan of care will be adjusted     PLAN:  Patient continues to benefit from skilled intervention to address the above impairments. Continue treatment per established plan of care. Discharge Recommendations:  Kranthi Andrews  Further Equipment Recommendations for Discharge:  tbd      SUBJECTIVE:   Patient stated ? I walked to the thermostat just before you came in.?    OBJECTIVE DATA SUMMARY:   Critical Behavior:              Functional Mobility Training:  Bed Mobility:     Supine to Sit: Modified independent  Sit to Supine: Modified independent           Transfers:  Sit to Stand: Supervision  Stand to Sit: Supervision                             Balance:  Sitting: Intact  Standing: Intact; With support  Standing - Static: Good  Standing - Dynamic : Fair  Ambulation/Gait Training:  Distance (ft): 15 Feet (ft)  Assistive Device: Gait belt;Cane, straight  Ambulation - Level of Assistance: Contact guard assistance        Gait Abnormalities: Antalgic; Shuffling gait                               Pain:  Pain Scale 1: Numeric (0 - 10)  Pain Intensity 1: 7  Pain Location 1: Foot  Pain Orientation 1: Left  Pain Description 1: Aching     Activity Tolerance:   Fair. After treatment:   ?    Patient left in no apparent distress sitting up in chair  ? Patient left in no apparent distress in bed  ? Call bell left within reach  ? Nursing notified  ? Caregiver present  ?     Bed alarm activated    COMMUNICATION/COLLABORATION:   The patient?s plan of care was discussed with: Registered Nurse    Raz Frias PTA   Time Calculation: 11 mins

## 2019-06-08 PROCEDURE — 74011250637 HC RX REV CODE- 250/637: Performed by: INTERNAL MEDICINE

## 2019-06-08 PROCEDURE — 74011000258 HC RX REV CODE- 258: Performed by: INTERNAL MEDICINE

## 2019-06-08 PROCEDURE — 65270000029 HC RM PRIVATE

## 2019-06-08 PROCEDURE — 74011250636 HC RX REV CODE- 250/636: Performed by: INTERNAL MEDICINE

## 2019-06-08 RX ADMIN — ACETAMINOPHEN 650 MG: 325 TABLET ORAL at 08:43

## 2019-06-08 RX ADMIN — TAMSULOSIN HYDROCHLORIDE 0.4 MG: 0.4 CAPSULE ORAL at 08:43

## 2019-06-08 RX ADMIN — MELATONIN TAB 3 MG 3 MG: 3 TAB at 04:28

## 2019-06-08 RX ADMIN — ACETAMINOPHEN 650 MG: 325 TABLET ORAL at 23:59

## 2019-06-08 RX ADMIN — AVOBENZONE, HOMOSALATE, OCTISALATE, OCTOCRYLENE, AND OXYBENZONE 1 PACKET: 29.4; 147; 49; 25.4; 58.8 LOTION TOPICAL at 08:42

## 2019-06-08 RX ADMIN — GABAPENTIN 300 MG: 100 CAPSULE ORAL at 08:43

## 2019-06-08 RX ADMIN — Medication 10 ML: at 12:20

## 2019-06-08 RX ADMIN — GABAPENTIN 300 MG: 100 CAPSULE ORAL at 15:44

## 2019-06-08 RX ADMIN — SERTRALINE HYDROCHLORIDE 50 MG: 50 TABLET ORAL at 08:44

## 2019-06-08 RX ADMIN — ENOXAPARIN SODIUM 40 MG: 40 INJECTION SUBCUTANEOUS at 04:17

## 2019-06-08 RX ADMIN — AVOBENZONE, HOMOSALATE, OCTISALATE, OCTOCRYLENE, AND OXYBENZONE 1 PACKET: 29.4; 147; 49; 25.4; 58.8 LOTION TOPICAL at 01:35

## 2019-06-08 RX ADMIN — SODIUM CHLORIDE 50 MG: 900 INJECTION, SOLUTION INTRAVENOUS at 15:46

## 2019-06-08 RX ADMIN — PANTOPRAZOLE SODIUM 40 MG: 40 TABLET, DELAYED RELEASE ORAL at 08:43

## 2019-06-08 RX ADMIN — ALLOPURINOL 100 MG: 100 TABLET ORAL at 08:44

## 2019-06-08 RX ADMIN — SODIUM CHLORIDE 50 MG: 900 INJECTION, SOLUTION INTRAVENOUS at 04:44

## 2019-06-08 RX ADMIN — SENNOSIDES AND DOCUSATE SODIUM 1 TABLET: 8.6; 5 TABLET ORAL at 08:44

## 2019-06-08 RX ADMIN — OXYCODONE HYDROCHLORIDE 10 MG: 5 TABLET ORAL at 20:01

## 2019-06-08 RX ADMIN — CEFTRIAXONE 2 G: 2 INJECTION, POWDER, FOR SOLUTION INTRAMUSCULAR; INTRAVENOUS at 20:49

## 2019-06-08 RX ADMIN — ACETAMINOPHEN 650 MG: 325 TABLET ORAL at 01:35

## 2019-06-08 RX ADMIN — LEVOTHYROXINE SODIUM 100 MCG: 100 TABLET ORAL at 08:43

## 2019-06-08 RX ADMIN — AMLODIPINE BESYLATE 10 MG: 5 TABLET ORAL at 08:43

## 2019-06-08 RX ADMIN — AVOBENZONE, HOMOSALATE, OCTISALATE, OCTOCRYLENE, AND OXYBENZONE 1 PACKET: 29.4; 147; 49; 25.4; 58.8 LOTION TOPICAL at 18:11

## 2019-06-08 RX ADMIN — POLYETHYLENE GLYCOL 3350 17 G: 17 POWDER, FOR SOLUTION ORAL at 08:45

## 2019-06-08 RX ADMIN — GABAPENTIN 300 MG: 100 CAPSULE ORAL at 21:09

## 2019-06-08 RX ADMIN — Medication 10 ML: at 21:10

## 2019-06-08 RX ADMIN — OXYCODONE HYDROCHLORIDE 10 MG: 5 TABLET ORAL at 12:18

## 2019-06-08 RX ADMIN — DAKIN'S SOLUTION 0.125% (QUARTER STRENGTH): 0.12 SOLUTION at 12:19

## 2019-06-08 RX ADMIN — OXYCODONE HYDROCHLORIDE 10 MG: 5 TABLET ORAL at 04:16

## 2019-06-08 RX ADMIN — METHADONE HYDROCHLORIDE 130 MG: 10 CONCENTRATE ORAL at 08:42

## 2019-06-08 RX ADMIN — LISINOPRIL 40 MG: 20 TABLET ORAL at 08:43

## 2019-06-08 NOTE — PROGRESS NOTES
6/7 2030: Advised patient that, per hospital protocol, his current IV (20 gauge L upper arm) needs to be removed because it is over 11days old, which increases risk of infection. Patient agreed but said 'I'm a really hard stick. \"     6/8 0350: This RN requested assistance from [place], RN to attempt to place new IV. Pepper Lin agreed to try. This RN accompanied her to patient's room. She explained to patient that she would attempt to place IV no more than two times. He agreed to procedure. With first attempt, Kedar inserted IV in patient's right arm, with a blood return. She attempted to further insert needle for final IV placement, and patient began shouting that it was hurting. This RN reminded patient to take breaths instead of holding breath to help reduce pain. Patient yelled at Pepper Lin and said I can't take it. Take it out now! \" She complied. She explained that if patient could try to relax by focusing on his breathing, the IV insertion would be less painful and more likely to be successful. Patient yelled at her, Laquita Godwin out of my room right now. \" After she left the room, the patient told this RN \"I want to talk to a patient advocate. \" This RN advised the patient of the patient advocate phone number on the white board and told him he could call at any time.

## 2019-06-08 NOTE — PROGRESS NOTES
[facility] 79  [address] [facility], [address] [place] [address]  [phone]      Medical Progress Note      NAME: Rolando Atkinson   :  1965  MRM:  [id number]    Date/Time: 2019         Subjective:     Chief Complaint:  Patient was seen and examined by me. Chart reviewed. Still with chronic pain. No acute issues         Objective:       Vitals:       Last 24hrs VS reviewed since prior progress note.  Most recent are:    Visit Vitals  BP 96/65 (BP 1 Location: Right arm, BP Patient Position: At rest)   Pulse 90   Temp 97.7 °F (36.5 °C)   Resp 18   Ht 6' 1\" (1.854 m)   Wt 108.2 kg (238 lb 9.6 oz)   SpO2 99%   BMI 31.48 kg/m²     SpO2 Readings from Last 6 Encounters:   19 99%   19 95%   19 99%   05/10/19 92%   10/12/18 96%   18 95%            Intake/Output Summary (Last 24 hours) at 2019 1042  Last data filed at 2019 2000  Gross per 24 hour   Intake --   Output 750 ml   Net -750 ml        Exam:     Physical Exam:    Gen:  Disheveled, ill-appearing, obese, NAD  HEENT:  Pink conjunctivae, PERRL, hearing intact to voice, moist mucous membranes  Neck:  Supple, without masses, thyroid non-tender  Resp:  No accessory muscle use, clear breath sounds without wheezes rales or rhonchi  Card:  No murmurs, normal S1, S2 without thrills, trace edema  Abd:  Soft, non-tender, non-distended, normoactive bowel sounds are present  Musc:  No cyanosis or clubbing  Skin:  L foot dressing c/d/i  Neuro:  Cranial nerves 3-12 are grossly intact, chronic L sided weakness, follows commands appropriately  Psych:  Fair insight, oriented to person, place and time, alert    Medications Reviewed: (see below)    Lab Data Reviewed: (see below)    ______________________________________________________________________    Medications:     Current Facility-Administered Medications   Medication Dose Route Frequency    psyllium husk-aspartame (METAMUCIL FIBER) packet 1 Packet  1 Packet Oral BID    gabapentin (NEURONTIN) capsule 300 mg  300 mg Oral TID    sodium hypochlorite (QUARTER STRENGTH DAKIN'S) 0.125% irrigation (bottle)   Topical EVERY OTHER DAY    polyethylene glycol (MIRALAX) packet 17 g  17 g Oral DAILY    acetaminophen (TYLENOL) tablet 650 mg  650 mg Oral Q4H PRN    melatonin tablet 3 mg  3 mg Oral QHS PRN    methadone (DOLOPHINE) 10 mg/mL concentrated solution 130 mg  130 mg Oral DAILY    colchicine tablet 0.6 mg  0.6 mg Oral DAILY PRN    lisinopril (PRINIVIL, ZESTRIL) tablet 40 mg  40 mg Oral DAILY    amLODIPine (NORVASC) tablet 10 mg  10 mg Oral DAILY    senna-docusate (PERICOLACE) 8.6-50 mg per tablet 1 Tab  1 Tab Oral DAILY    sertraline (ZOLOFT) tablet 50 mg  50 mg Oral DAILY    allopurinol (ZYLOPRIM) tablet 100 mg  100 mg Oral DAILY    tamsulosin (FLOMAX) capsule 0.4 mg  0.4 mg Oral DAILY    nicotine (NICODERM CQ) 21 mg/24 hr patch 1 Patch  1 Patch TransDERmal DAILY PRN    OLANZapine (ZyPREXA) tablet 5 mg  5 mg Oral Q6H PRN    sodium chloride (NS) flush 5-40 mL  5-40 mL IntraVENous Q8H    sodium chloride (NS) flush 5-40 mL  5-40 mL IntraVENous PRN    diphenhydrAMINE (BENADRYL) capsule 25 mg  25 mg Oral Q4H PRN    ondansetron (ZOFRAN) injection 4 mg  4 mg IntraVENous Q4H PRN    enoxaparin (LOVENOX) injection 40 mg  40 mg SubCUTAneous Q24H    cefTRIAXone (ROCEPHIN) 2 g in 0.9% sodium chloride (MBP/ADV) 50 mL  2 g IntraVENous Q24H    TIGEcycline (TYGACIL) 50 mg in 0.9% sodium chloride (MBP/ADV) 100 mL  50 mg IntraVENous Q12H    pantoprazole (PROTONIX) tablet 40 mg  40 mg Oral ACB    alum-mag hydroxide-simeth (MYLANTA) oral suspension 30 mL  30 mL Oral Q4H PRN    levothyroxine (SYNTHROID) tablet 100 mcg  100 mcg Oral ACB    oxyCODONE IR (ROXICODONE) tablet 10 mg  10 mg Oral Q6H PRN          Lab Review:     Recent Labs     06/07/19  0417   WBC 8.1   HGB 10.8*   HCT 33.9*        Recent Labs     06/07/19  0417      K 4.2      CO2 29    BUN 22*   CREA 0.76   CA 8.6   MG 2.0   PHOS 4.2     Lab Results   Component Value Date/Time    Glucose (POC) 150 (H) 03/19/2018 11:45 AM    Glucose (POC) 123 (H) 03/18/2018 08:55 PM    Glucose (POC) 96 03/16/2018 04:58 PM    Glucose (POC) 116 (H) 12/12/2012 12:42 PM    Glucose (POC) 99 12/11/2012 11:27 PM          Assessment / Plan: Active Problems:    47 yo hx of HTN, CVA w/ L hemiparesis, bladder CA, depression, chronic pain, presented w/ L foot wound infection    1) Acute on chronic L foot wound infection: s/p I&D by podiatry on 05/25. Wound cx with stenotrophomonas, proteus, klebsiella. Cont IV CTX/tigecycline until 06/10 (due to multiple allergies to abx). Patient will then f/u with [facility] wound center and with Dr. Nicole Mathew (Vascular) for saphenous vein ablation    2) HTN: cont norvasc, lisinopril    3) Hypothyroid: TSH elevated but patient was non-compliance. Will cont synthroid. Repeat TFTs in 4 weeks    4) Chronic pain/depression: cont methadone, SSRI    5) Hx of bladder CA: s/p surgery in 04/2018 at [place]. Will need to f/u with his Urologist.  Cont flomax    6) Hx of hemorrhagic CVA w/ L sided hemiparesis: cont PT/OT.   Awaiting SNF    Total time spent with patient: 20 min                  Care Plan discussed with: Patient, nursing    Discussed:  Care Plan    Prophylaxis:  Lovenox    Disposition:  SNF/LTC           ___________________________________________________    Attending Physician: Oj Edge MD

## 2019-06-08 NOTE — PROGRESS NOTES
Bedside shift change report given to Maximiliano-RNs (oncoming nurses) by Hal Camacho (offgoing nurse). Report included the following information SBAR, MAR and Recent Results.

## 2019-06-08 NOTE — ROUTINE PROCESS
Bedside and Verbal shift change report given to [address] [place] [address] Drive  (oncoming nurse) by Diana Shine RN  (offgoing nurse). Report included the following information SBAR, Kardex and Recent Results.

## 2019-06-08 NOTE — PROGRESS NOTES
RN educated pt on best practice of inserting new IV since pt's IV is old. Pt previously refused yesterday and with night shift nurses last night. RN will offer new IV again later if pt allows. 1012: RN spoke with MD Do about pt having old IV. MD said it was okay for pt to keep IV in as long as it was working safely.

## 2019-06-09 LAB
ALBUMIN SERPL-MCNC: 3.3 G/DL (ref 3.5–5)
ALBUMIN/GLOB SERPL: 1.1 {RATIO} (ref 1.1–2.2)
ALP SERPL-CCNC: 214 U/L (ref 45–117)
ALT SERPL-CCNC: 23 U/L (ref 12–78)
ANION GAP SERPL CALC-SCNC: 4 MMOL/L (ref 5–15)
AST SERPL-CCNC: 22 U/L (ref 15–37)
BILIRUB DIRECT SERPL-MCNC: 0.1 MG/DL (ref 0–0.2)
BILIRUB SERPL-MCNC: 0.2 MG/DL (ref 0.2–1)
BUN SERPL-MCNC: 30 MG/DL (ref 6–20)
BUN/CREAT SERPL: 36 (ref 12–20)
CALCIUM SERPL-MCNC: 8.4 MG/DL (ref 8.5–10.1)
CHLORIDE SERPL-SCNC: 105 MMOL/L (ref 97–108)
CO2 SERPL-SCNC: 30 MMOL/L (ref 21–32)
CREAT SERPL-MCNC: 0.83 MG/DL (ref 0.7–1.3)
ERYTHROCYTE [DISTWIDTH] IN BLOOD BY AUTOMATED COUNT: 14.2 % (ref 11.5–14.5)
GLOBULIN SER CALC-MCNC: 3.1 G/DL (ref 2–4)
GLUCOSE SERPL-MCNC: 123 MG/DL (ref 65–100)
HCT VFR BLD AUTO: 35.6 % (ref 36.6–50.3)
HGB BLD-MCNC: 11 G/DL (ref 12.1–17)
MAGNESIUM SERPL-MCNC: 2 MG/DL (ref 1.6–2.4)
MCH RBC QN AUTO: 28 PG (ref 26–34)
MCHC RBC AUTO-ENTMCNC: 30.9 G/DL (ref 30–36.5)
MCV RBC AUTO: 90.6 FL (ref 80–99)
NRBC # BLD: 0 K/UL (ref 0–0.01)
NRBC BLD-RTO: 0 PER 100 WBC
PHOSPHATE SERPL-MCNC: 4 MG/DL (ref 2.6–4.7)
PLATELET # BLD AUTO: 274 K/UL (ref 150–400)
PMV BLD AUTO: 9.9 FL (ref 8.9–12.9)
POTASSIUM SERPL-SCNC: 4.6 MMOL/L (ref 3.5–5.1)
PROT SERPL-MCNC: 6.4 G/DL (ref 6.4–8.2)
RBC # BLD AUTO: 3.93 M/UL (ref 4.1–5.7)
SODIUM SERPL-SCNC: 139 MMOL/L (ref 136–145)
WBC # BLD AUTO: 8.9 K/UL (ref 4.1–11.1)

## 2019-06-09 PROCEDURE — 85027 COMPLETE CBC AUTOMATED: CPT

## 2019-06-09 PROCEDURE — 74011250637 HC RX REV CODE- 250/637: Performed by: INTERNAL MEDICINE

## 2019-06-09 PROCEDURE — 74011000258 HC RX REV CODE- 258: Performed by: INTERNAL MEDICINE

## 2019-06-09 PROCEDURE — 80076 HEPATIC FUNCTION PANEL: CPT

## 2019-06-09 PROCEDURE — 74011250636 HC RX REV CODE- 250/636: Performed by: INTERNAL MEDICINE

## 2019-06-09 PROCEDURE — 36415 COLL VENOUS BLD VENIPUNCTURE: CPT

## 2019-06-09 PROCEDURE — 84100 ASSAY OF PHOSPHORUS: CPT

## 2019-06-09 PROCEDURE — 80048 BASIC METABOLIC PNL TOTAL CA: CPT

## 2019-06-09 PROCEDURE — 83735 ASSAY OF MAGNESIUM: CPT

## 2019-06-09 PROCEDURE — 65270000029 HC RM PRIVATE

## 2019-06-09 RX ADMIN — METHADONE HYDROCHLORIDE 130 MG: 10 CONCENTRATE ORAL at 08:56

## 2019-06-09 RX ADMIN — SODIUM CHLORIDE 50 MG: 900 INJECTION, SOLUTION INTRAVENOUS at 15:21

## 2019-06-09 RX ADMIN — ALLOPURINOL 100 MG: 100 TABLET ORAL at 08:57

## 2019-06-09 RX ADMIN — LEVOTHYROXINE SODIUM 100 MCG: 100 TABLET ORAL at 05:11

## 2019-06-09 RX ADMIN — AVOBENZONE, HOMOSALATE, OCTISALATE, OCTOCRYLENE, AND OXYBENZONE 1 PACKET: 29.4; 147; 49; 25.4; 58.8 LOTION TOPICAL at 17:57

## 2019-06-09 RX ADMIN — ACETAMINOPHEN 650 MG: 325 TABLET ORAL at 16:20

## 2019-06-09 RX ADMIN — SENNOSIDES AND DOCUSATE SODIUM 1 TABLET: 8.6; 5 TABLET ORAL at 08:57

## 2019-06-09 RX ADMIN — AVOBENZONE, HOMOSALATE, OCTISALATE, OCTOCRYLENE, AND OXYBENZONE 1 PACKET: 29.4; 147; 49; 25.4; 58.8 LOTION TOPICAL at 08:55

## 2019-06-09 RX ADMIN — SODIUM CHLORIDE 50 MG: 900 INJECTION, SOLUTION INTRAVENOUS at 03:16

## 2019-06-09 RX ADMIN — GABAPENTIN 300 MG: 100 CAPSULE ORAL at 21:12

## 2019-06-09 RX ADMIN — ACETAMINOPHEN 650 MG: 325 TABLET ORAL at 03:17

## 2019-06-09 RX ADMIN — Medication 10 ML: at 21:12

## 2019-06-09 RX ADMIN — CEFTRIAXONE 2 G: 2 INJECTION, POWDER, FOR SOLUTION INTRAMUSCULAR; INTRAVENOUS at 21:12

## 2019-06-09 RX ADMIN — Medication 10 ML: at 15:21

## 2019-06-09 RX ADMIN — ACETAMINOPHEN 650 MG: 325 TABLET ORAL at 23:02

## 2019-06-09 RX ADMIN — OXYCODONE HYDROCHLORIDE 10 MG: 5 TABLET ORAL at 01:30

## 2019-06-09 RX ADMIN — LISINOPRIL 40 MG: 20 TABLET ORAL at 08:56

## 2019-06-09 RX ADMIN — OXYCODONE HYDROCHLORIDE 10 MG: 5 TABLET ORAL at 06:38

## 2019-06-09 RX ADMIN — PANTOPRAZOLE SODIUM 40 MG: 40 TABLET, DELAYED RELEASE ORAL at 05:10

## 2019-06-09 RX ADMIN — OXYCODONE HYDROCHLORIDE 10 MG: 5 TABLET ORAL at 19:55

## 2019-06-09 RX ADMIN — OXYCODONE HYDROCHLORIDE 10 MG: 5 TABLET ORAL at 12:53

## 2019-06-09 RX ADMIN — MELATONIN TAB 3 MG 3 MG: 3 TAB at 01:30

## 2019-06-09 RX ADMIN — AMLODIPINE BESYLATE 10 MG: 5 TABLET ORAL at 08:56

## 2019-06-09 RX ADMIN — TAMSULOSIN HYDROCHLORIDE 0.4 MG: 0.4 CAPSULE ORAL at 08:57

## 2019-06-09 RX ADMIN — ENOXAPARIN SODIUM 40 MG: 40 INJECTION SUBCUTANEOUS at 01:32

## 2019-06-09 RX ADMIN — GABAPENTIN 300 MG: 100 CAPSULE ORAL at 15:19

## 2019-06-09 RX ADMIN — SERTRALINE HYDROCHLORIDE 50 MG: 50 TABLET ORAL at 08:57

## 2019-06-09 RX ADMIN — GABAPENTIN 300 MG: 100 CAPSULE ORAL at 08:57

## 2019-06-09 NOTE — PROGRESS NOTES
Bedside and Verbal shift change report given to [address] (oncoming nurse) by John Hardin (offgoing nurse). Report included the following information SBAR, Kardex, Procedure Summary, Intake/Output, MAR, Recent Results and Med Rec Status.

## 2019-06-09 NOTE — ROUTINE PROCESS
Bedside and Verbal shift change report given to [address] [place] [address] Drive  (oncoming nurse) by Urbano Castañeda / Chata Kilpatrick RN  (offgoing nurse). Report included the following information SBAR, Kardex, MAR and Recent Results.

## 2019-06-09 NOTE — PROGRESS NOTES
Gigi Marino [facility] 79  [address] [facility], [address] [place] [address]  [phone]      Medical Progress Note      NAME: Luis Miguel Ortega   :  1965  MRM:  [id number]    Date/Time: 2019         Subjective:     Chief Complaint:  Patient was seen and examined by me. Chart reviewed. Able to ambulate with cane. Has severe pain with ambulation       Objective:       Vitals:       Last 24hrs VS reviewed since prior progress note.  Most recent are:    Visit Vitals  /67 (BP 1 Location: Right arm, BP Patient Position: At rest)   Pulse 61   Temp 97.8 °F (36.6 °C)   Resp 16   Ht 6' 1\" (1.854 m)   Wt 108.2 kg (238 lb 9.6 oz)   SpO2 99%   BMI 31.48 kg/m²     SpO2 Readings from Last 6 Encounters:   19 99%   19 95%   19 99%   05/10/19 92%   10/12/18 96%   18 95%            Intake/Output Summary (Last 24 hours) at 2019 1205  Last data filed at 2019 0203  Gross per 24 hour   Intake 240 ml   Output 910 ml   Net -670 ml        Exam:     Physical Exam:    Gen:  Disheveled, ill-appearing, obese, NAD  HEENT:  Pink conjunctivae, PERRL, hearing intact to voice, moist mucous membranes  Neck:  Supple, without masses, thyroid non-tender  Resp:  No accessory muscle use, clear breath sounds without wheezes rales or rhonchi  Card:  No murmurs, normal S1, S2 without thrills, trace edema  Abd:  Soft, non-tender, non-distended, normoactive bowel sounds are present  Musc:  No cyanosis or clubbing  Skin:  L foot dressing c/d/i, has boot  Neuro:  Cranial nerves 3-12 are grossly intact, chronic L sided weakness, follows commands appropriately  Psych:  Fair insight, oriented to person, place and time, alert    Medications Reviewed: (see below)    Lab Data Reviewed: (see below)    ______________________________________________________________________    Medications:     Current Facility-Administered Medications   Medication Dose Route Frequency    psyllium husk-aspartame (METAMUCIL FIBER) packet 1 Packet  1 Packet Oral BID    gabapentin (NEURONTIN) capsule 300 mg  300 mg Oral TID    sodium hypochlorite (QUARTER STRENGTH DAKIN'S) 0.125% irrigation (bottle)   Topical EVERY OTHER DAY    polyethylene glycol (MIRALAX) packet 17 g  17 g Oral DAILY    acetaminophen (TYLENOL) tablet 650 mg  650 mg Oral Q4H PRN    melatonin tablet 3 mg  3 mg Oral QHS PRN    methadone (DOLOPHINE) 10 mg/mL concentrated solution 130 mg  130 mg Oral DAILY    colchicine tablet 0.6 mg  0.6 mg Oral DAILY PRN    lisinopril (PRINIVIL, ZESTRIL) tablet 40 mg  40 mg Oral DAILY    amLODIPine (NORVASC) tablet 10 mg  10 mg Oral DAILY    senna-docusate (PERICOLACE) 8.6-50 mg per tablet 1 Tab  1 Tab Oral DAILY    sertraline (ZOLOFT) tablet 50 mg  50 mg Oral DAILY    allopurinol (ZYLOPRIM) tablet 100 mg  100 mg Oral DAILY    tamsulosin (FLOMAX) capsule 0.4 mg  0.4 mg Oral DAILY    nicotine (NICODERM CQ) 21 mg/24 hr patch 1 Patch  1 Patch TransDERmal DAILY PRN    OLANZapine (ZyPREXA) tablet 5 mg  5 mg Oral Q6H PRN    sodium chloride (NS) flush 5-40 mL  5-40 mL IntraVENous Q8H    sodium chloride (NS) flush 5-40 mL  5-40 mL IntraVENous PRN    diphenhydrAMINE (BENADRYL) capsule 25 mg  25 mg Oral Q4H PRN    ondansetron (ZOFRAN) injection 4 mg  4 mg IntraVENous Q4H PRN    enoxaparin (LOVENOX) injection 40 mg  40 mg SubCUTAneous Q24H    cefTRIAXone (ROCEPHIN) 2 g in 0.9% sodium chloride (MBP/ADV) 50 mL  2 g IntraVENous Q24H    TIGEcycline (TYGACIL) 50 mg in 0.9% sodium chloride (MBP/ADV) 100 mL  50 mg IntraVENous Q12H    pantoprazole (PROTONIX) tablet 40 mg  40 mg Oral ACB    alum-mag hydroxide-simeth (MYLANTA) oral suspension 30 mL  30 mL Oral Q4H PRN    levothyroxine (SYNTHROID) tablet 100 mcg  100 mcg Oral ACB    oxyCODONE IR (ROXICODONE) tablet 10 mg  10 mg Oral Q6H PRN          Lab Review:     Recent Labs     06/09/19  0223 06/07/19  0417   WBC 8.9 8.1   HGB 11.0* 10.8*   HCT 35.6* 33.9*    305     Recent Labs     06/09/19  0223 06/07/19  0417    137   K 4.6 4.2    103   CO2 30 29   * 100   BUN 30* 22*   CREA 0.83 0.76   CA 8.4* 8.6   MG 2.0 2.0   PHOS 4.0 4.2   ALB 3.3*  --    TBILI 0.2  --    SGOT 22  --    ALT 23  --      Lab Results   Component Value Date/Time    Glucose (POC) 150 (H) 03/19/2018 11:45 AM    Glucose (POC) 123 (H) 03/18/2018 08:55 PM    Glucose (POC) 96 03/16/2018 04:58 PM    Glucose (POC) 116 (H) 12/12/2012 12:42 PM    Glucose (POC) 99 12/11/2012 11:27 PM          Assessment / Plan: Active Problems:    47 yo hx of HTN, CVA w/ L hemiparesis, bladder CA, depression, chronic pain, presented w/ L foot wound infection    1) Acute on chronic L foot wound infection: s/p I&D by podiatry on 05/25. Wound cx with stenotrophomonas, proteus, klebsiella. Plan is to cont IV CTX/tigecycline until 06/10 (due to multiple allergies to abx). Then patient will f/u with [facility] wound center and with Dr. Laraine Libman (Vascular) for saphenous vein ablation. Will re-consult wound care today to look at wound. Patient stated that wound looks worse    2) HTN: BP stable. Cont norvasc, lisinopril    3) Hypothyroid: TSH elevated but patient was non-compliance. Will cont synthroid. Repeat TFTs in 4 weeks    4) Chronic pain/depression: cont methadone, SSRI    5) Hx of bladder CA: s/p surgery in 04/2018 at [place]. Will need to f/u with his Urologist.  Cont flomax    6) Hx of hemorrhagic CVA w/ L sided hemiparesis: cont PT/OT.   Awaiting SNF once off IV abx    Total time spent with patient: 20 min                  Care Plan discussed with: Patient, nursing    Discussed:  Care Plan    Prophylaxis:  Lovenox    Disposition:  SNF/LTC           ___________________________________________________    Attending Physician: Dima Cooper MD

## 2019-06-09 NOTE — PROGRESS NOTES
Bed locked in lowest position, call light in reach. Pt pain controled with corbin q 6 and tylenol q4 along with repositioning. Pt currently resting, will cont to monitor. Pt refused to have IV removed, IV is currently out of date, pt refuses to have another IV started. IV currently flushes, and no evidence of phlebitis or infiltration. Will cont to monitor site.

## 2019-06-10 PROCEDURE — 74011250637 HC RX REV CODE- 250/637: Performed by: INTERNAL MEDICINE

## 2019-06-10 PROCEDURE — 65270000029 HC RM PRIVATE

## 2019-06-10 PROCEDURE — 74011000258 HC RX REV CODE- 258: Performed by: INTERNAL MEDICINE

## 2019-06-10 PROCEDURE — 97116 GAIT TRAINING THERAPY: CPT

## 2019-06-10 PROCEDURE — 74011250636 HC RX REV CODE- 250/636: Performed by: INTERNAL MEDICINE

## 2019-06-10 PROCEDURE — 77030018836 HC SOL IRR NACL ICUM -A

## 2019-06-10 RX ADMIN — SODIUM CHLORIDE 50 MG: 900 INJECTION, SOLUTION INTRAVENOUS at 15:06

## 2019-06-10 RX ADMIN — AMLODIPINE BESYLATE 10 MG: 5 TABLET ORAL at 08:45

## 2019-06-10 RX ADMIN — OXYCODONE HYDROCHLORIDE 10 MG: 5 TABLET ORAL at 15:06

## 2019-06-10 RX ADMIN — AVOBENZONE, HOMOSALATE, OCTISALATE, OCTOCRYLENE, AND OXYBENZONE 1 PACKET: 29.4; 147; 49; 25.4; 58.8 LOTION TOPICAL at 17:13

## 2019-06-10 RX ADMIN — ACETAMINOPHEN 650 MG: 325 TABLET ORAL at 16:37

## 2019-06-10 RX ADMIN — OXYCODONE HYDROCHLORIDE 10 MG: 5 TABLET ORAL at 02:21

## 2019-06-10 RX ADMIN — Medication 10 ML: at 06:00

## 2019-06-10 RX ADMIN — Medication 10 ML: at 21:38

## 2019-06-10 RX ADMIN — ACETAMINOPHEN 650 MG: 325 TABLET ORAL at 04:43

## 2019-06-10 RX ADMIN — LISINOPRIL 40 MG: 20 TABLET ORAL at 08:45

## 2019-06-10 RX ADMIN — TAMSULOSIN HYDROCHLORIDE 0.4 MG: 0.4 CAPSULE ORAL at 08:45

## 2019-06-10 RX ADMIN — SERTRALINE HYDROCHLORIDE 50 MG: 50 TABLET ORAL at 08:45

## 2019-06-10 RX ADMIN — OXYCODONE HYDROCHLORIDE 10 MG: 5 TABLET ORAL at 21:38

## 2019-06-10 RX ADMIN — PANTOPRAZOLE SODIUM 40 MG: 40 TABLET, DELAYED RELEASE ORAL at 06:35

## 2019-06-10 RX ADMIN — ENOXAPARIN SODIUM 40 MG: 40 INJECTION SUBCUTANEOUS at 02:22

## 2019-06-10 RX ADMIN — GABAPENTIN 300 MG: 100 CAPSULE ORAL at 15:06

## 2019-06-10 RX ADMIN — MELATONIN TAB 3 MG 3 MG: 3 TAB at 00:43

## 2019-06-10 RX ADMIN — CEFTRIAXONE SODIUM 2 G: 2 INJECTION, POWDER, FOR SOLUTION INTRAMUSCULAR; INTRAVENOUS at 21:38

## 2019-06-10 RX ADMIN — OXYCODONE HYDROCHLORIDE 10 MG: 5 TABLET ORAL at 08:45

## 2019-06-10 RX ADMIN — GABAPENTIN 300 MG: 100 CAPSULE ORAL at 21:38

## 2019-06-10 RX ADMIN — AVOBENZONE, HOMOSALATE, OCTISALATE, OCTOCRYLENE, AND OXYBENZONE 1 PACKET: 29.4; 147; 49; 25.4; 58.8 LOTION TOPICAL at 08:44

## 2019-06-10 RX ADMIN — METHADONE HYDROCHLORIDE 130 MG: 10 CONCENTRATE ORAL at 08:44

## 2019-06-10 RX ADMIN — SODIUM CHLORIDE 50 MG: 900 INJECTION, SOLUTION INTRAVENOUS at 04:36

## 2019-06-10 RX ADMIN — LEVOTHYROXINE SODIUM 100 MCG: 100 TABLET ORAL at 06:35

## 2019-06-10 RX ADMIN — ALLOPURINOL 100 MG: 100 TABLET ORAL at 08:45

## 2019-06-10 RX ADMIN — GABAPENTIN 300 MG: 100 CAPSULE ORAL at 08:45

## 2019-06-10 NOTE — PROGRESS NOTES
PHYSICAL THERAPY TREATMENT  Patient: Rosy Clayton (36 y.o. male)  Date: 6/10/2019  Diagnosis: Open wound, lower leg [S81.614H] <principal problem not specified>       Precautions:    Chart, physical therapy assessment, plan of care and goals were reviewed. ASSESSMENT:  Pt continues to tolerate short distance ambulation within the room and/or to the bathroom at [facility] with SPC. Distance limited by pain in L foot wound. Pt with fair static standing balance without UE support at the sink. Pt reached into the basin for his toothbrush and cut his finger on an uncovered razor. Following hygiene at the sink pt returned supine,  2 small bandages were placed over the small cut on pt's right index finger. RN notified. Progression toward goals:  ?    Improving appropriately and progressing toward goals  ? Improving slowly and progressing toward goals  ? Not making progress toward goals and plan of care will be adjusted     PLAN:  Patient continues to benefit from skilled intervention to address the above impairments. Continue treatment per established plan of care. Discharge Recommendations:  Kranthi Andrews  Further Equipment Recommendations for Discharge:  straight cane     SUBJECTIVE:   Patient stated ? It hurts alot. ?  (left foot wound)    OBJECTIVE DATA SUMMARY:   Critical Behavior:              Functional Mobility Training:  Bed Mobility:     Supine to Sit: Modified independent  Sit to Supine: Modified independent           Transfers:  Sit to Stand: Stand-by assistance  Stand to Sit: Stand-by assistance                             Balance:  Sitting: Intact; With support  Standing: Intact; With support  Standing - Static: Good  Standing - Dynamic : Fair  Ambulation/Gait Training:  Distance (ft): 15 Feet (ft)  Assistive Device: Cane, straight  Ambulation - Level of Assistance: Stand-by assistance;Contact guard assistance                 Base of Support: Shift to right  Stance: Left decreased  Speed/Reva: Slow                      Pain:  Pain Scale 1: Adult Nonverbal Pain Scale  Pain Intensity 1: 0(pt sleeping )  Pain Location 1: Leg;Foot  Pain Orientation 1: Left  Pain Description 1: Burning; Ricky Primmer; Throbbing; Shooting  Pain Intervention(s) 1: Medication (see MAR)  Activity Tolerance:   Fair. After treatment:   ?    Patient left in no apparent distress sitting up in chair  ? Patient left in no apparent distress in bed  ? Call bell left within reach  ? Nursing notified  ? Caregiver present  ?     Bed alarm activated    COMMUNICATION/COLLABORATION:   The patient?s plan of care was discussed with: Registered Nurse    Shena Hurtado PTA   Time Calculation: 15 mins

## 2019-06-10 NOTE — PROGRESS NOTES
CM followed up with remaining facilities to verify status of referral. CM was informed by [address] [place] [address] Mary. CM was requested to resedn information. [facility] and [facility] has denied. CM continue to follow for discharge planning.        Katelyn Bermudez, [facility], [address] [place] [address]

## 2019-06-10 NOTE — PROGRESS NOTES
[facility] Progress Note           Venkata Plunkett DO    [phone] Office  [phone]  Fax    6/10/2019      Assessment & Plan:   1. Chronic left foot wound status post incision and drainage on 2019. Previous cultures grew Stenotrophomonas maltophilia, Enterococcus, and Providencia. Repeat wound cultures growing SMALT, proteus, and klebsiella. Currently on tigecycline and ceftriaxone. Unfortunately due to patients allergies there are no PO abx options other than omadacycline (new tetracycline abx) which insurance will not cover due to cost. D/w Dr Azael Branch. Recommend o/p follow up with [facility] for wound care/compression and follow up with Dr Azael Branch in office for saphenous vein ablation (which cannot be done in hospital due to lack of equipment) Completed 10 days tigecycline/ceftriaxone Monday 6/10  2. Multiple antibiotic allergies including vancomycin, sulfa, and ciprofloxacin. 3. History of alcohol abuse. 4. Chronic pain. The patient is on methadone for chronic pain. He denies a history of drug abuse. 5. History of bladder cancer. 6. History of hemorrhagic stroke with left-sided hemiparesis.                Subjective:     Seen with WCN. Foot remains painful    Objective:     Vitals:   Visit Vitals  /87 (BP 1 Location: Right arm, BP Patient Position: Sitting)   Pulse 82   Temp 98 °F (36.7 °C)   Resp 19   Ht 6' 1\" (1.854 m)   Wt 108.2 kg (238 lb 9.6 oz)   SpO2 97%   BMI 31.48 kg/m²        Tmax:  Temp (24hrs), Av.9 °F (36.6 °C), Min:97.3 °F (36.3 °C), Max:98.2 °F (36.8 °C)      Exam:   Patient is intubated:  no    Physical Examination:   General:  Alert, cooperative, no distress   Head:  Normocephalic, atraumatic. Eyes:  Conjunctivae clear   Neck: Supple       Lungs:   No distress. Chest wall:     Heart:     Abdomen:      Extremities: Moves all. No erythema. Some granulation tissue   Skin:  No rash   Neurologic: CNII-XII intact.  Normal strength Labs:        No lab exists for component: ITNL   No results for input(s): CPK, CKMB, TROIQ in the last 72 hours. Recent Labs     06/09/19  0223      K 4.6      CO2 30   BUN 30*   CREA 0.83   *   PHOS 4.0   MG 2.0   ALB 3.3*   WBC 8.9   HGB 11.0*   HCT 35.6*        No results for input(s): INR, PTP, APTT in the last 72 hours. No lab exists for component: INREXT, INREXT  Needs: urine analysis, urine sodium, protein and creatinine  No results found for: VIVEK, CREAU      Cultures:     Lab Results   Component Value Date/Time    Specimen Description: BLOOD 02/04/2010 10:55 PM    Specimen Description: BLOOD 02/12/2009 11:30 PM     Lab Results   Component Value Date/Time    Culture result: HEAVY DIPHTHEROIDS (A) 05/30/2019 03:00 PM    Culture result: (A) 05/30/2019 03:00 PM     LIGHT STENOTROPHOMONAS (Eliu Sill.) MALTOPHILIA CLSI GUIDELINES DO NOT RECOMMEND REPORTING SUSCEPTIBILITIES FOR S. MALTOPHILIA. TRIMETHOPRIM/SULFAMETHOXAZOLE IS THE DRUG OF CHOICE.     Culture result: FEW PROTEUS MIRABILIS (A) 05/30/2019 03:00 PM    Culture result: FEW KLEBSIELLA PNEUMONIAE (A) 05/30/2019 03:00 PM       Radiology:     Medications       Current Facility-Administered Medications   Medication Dose Route Frequency Last Dose    psyllium husk-aspartame (METAMUCIL FIBER) packet 1 Packet  1 Packet Oral BID 1 Packet at 06/10/19 0844    gabapentin (NEURONTIN) capsule 300 mg  300 mg Oral  mg at 06/10/19 0845    sodium hypochlorite (QUARTER STRENGTH DAKIN'S) 0.125% irrigation (bottle)   Topical EVERY OTHER DAY      polyethylene glycol (MIRALAX) packet 17 g  17 g Oral DAILY 17 g at 06/08/19 0845    acetaminophen (TYLENOL) tablet 650 mg  650 mg Oral Q4H  mg at 06/10/19 0443    melatonin tablet 3 mg  3 mg Oral QHS PRN 3 mg at 06/10/19 0043    methadone (DOLOPHINE) 10 mg/mL concentrated solution 130 mg  130 mg Oral DAILY 130 mg at 06/10/19 0844    colchicine tablet 0.6 mg  0.6 mg Oral DAILY PRN 0.6 mg at 06/06/19 0927    lisinopril (PRINIVIL, ZESTRIL) tablet 40 mg  40 mg Oral DAILY 40 mg at 06/10/19 0845    amLODIPine (NORVASC) tablet 10 mg  10 mg Oral DAILY 10 mg at 06/10/19 0845    senna-docusate (PERICOLACE) 8.6-50 mg per tablet 1 Tab  1 Tab Oral DAILY 1 Tab at 06/09/19 0857    sertraline (ZOLOFT) tablet 50 mg  50 mg Oral DAILY 50 mg at 06/10/19 0845    allopurinol (ZYLOPRIM) tablet 100 mg  100 mg Oral DAILY 100 mg at 06/10/19 0845    tamsulosin (FLOMAX) capsule 0.4 mg  0.4 mg Oral DAILY 0.4 mg at 06/10/19 0845    nicotine (NICODERM CQ) 21 mg/24 hr patch 1 Patch  1 Patch TransDERmal DAILY PRN      OLANZapine (ZyPREXA) tablet 5 mg  5 mg Oral Q6H PRN      sodium chloride (NS) flush 5-40 mL  5-40 mL IntraVENous Q8H 10 mL at 06/10/19 0600    sodium chloride (NS) flush 5-40 mL  5-40 mL IntraVENous PRN 10 mL at 06/02/19 0419    diphenhydrAMINE (BENADRYL) capsule 25 mg  25 mg Oral Q4H PRN      ondansetron (ZOFRAN) injection 4 mg  4 mg IntraVENous Q4H PRN 4 mg at 06/03/19 0653    enoxaparin (LOVENOX) injection 40 mg  40 mg SubCUTAneous Q24H 40 mg at 06/10/19 0222    cefTRIAXone (ROCEPHIN) 2 g in 0.9% sodium chloride (MBP/ADV) 50 mL  2 g IntraVENous Q24H 2 g at 06/09/19 2112    TIGEcycline (TYGACIL) 50 mg in 0.9% sodium chloride (MBP/ADV) 100 mL  50 mg IntraVENous Q12H 50 mg at 06/10/19 0436    pantoprazole (PROTONIX) tablet 40 mg  40 mg Oral ACB 40 mg at 06/10/19 5329    alum-mag hydroxide-simeth (MYLANTA) oral suspension 30 mL  30 mL Oral Q4H PRN 30 mL at 05/31/19 0405    levothyroxine (SYNTHROID) tablet 100 mcg  100 mcg Oral  mcg at 06/10/19 0635    oxyCODONE IR (ROXICODONE) tablet 10 mg  10 mg Oral Q6H PRN 10 mg at 06/10/19 0845           Case discussed with:NILDA Myers,

## 2019-06-10 NOTE — PROGRESS NOTES
Notified  of patient's complaints of excruciating pain all night shift from left foot which is not relieved with pain medications given around the clock. Patient describe pain as aching, sharp , shooting and throbbing. Patient requested MD to check his foot.

## 2019-06-10 NOTE — DISCHARGE SUMMARY
This is an interim summary from 06/07 to 06/10    Admission date: 05/29  Admission diagnosis: left foot wound infection  Consults: podiatry, ID, wound care    47 yo hx of HTN, CVA w/ L hemiparesis, bladder CA, depression, chronic pain, presented w/ L foot wound infection. Patient is now s/p 10 days of IV CTX/Tigecycline (ended 06/10). Foot wound is much improved. Now awaiting SNF.   Will need to f/u with wound care clinic and with Vascular, Dr. Terrie Dove, for a saphenous vein ablation    Dispo: awaiting SNF    Current meds:  Current Facility-Administered Medications   Medication Dose Route Frequency    cefTRIAXone (ROCEPHIN) 2 g in 0.9% sodium chloride (MBP/ADV) 50 mL  2 g IntraVENous Q24H    TIGEcycline (TYGACIL) 50 mg in 0.9% sodium chloride (MBP/ADV) 100 mL  50 mg IntraVENous Q12H    psyllium husk-aspartame (METAMUCIL FIBER) packet 1 Packet  1 Packet Oral BID    gabapentin (NEURONTIN) capsule 300 mg  300 mg Oral TID    polyethylene glycol (MIRALAX) packet 17 g  17 g Oral DAILY    acetaminophen (TYLENOL) tablet 650 mg  650 mg Oral Q4H PRN    melatonin tablet 3 mg  3 mg Oral QHS PRN    methadone (DOLOPHINE) 10 mg/mL concentrated solution 130 mg  130 mg Oral DAILY    colchicine tablet 0.6 mg  0.6 mg Oral DAILY PRN    lisinopril (PRINIVIL, ZESTRIL) tablet 40 mg  40 mg Oral DAILY    amLODIPine (NORVASC) tablet 10 mg  10 mg Oral DAILY    senna-docusate (PERICOLACE) 8.6-50 mg per tablet 1 Tab  1 Tab Oral DAILY    sertraline (ZOLOFT) tablet 50 mg  50 mg Oral DAILY    allopurinol (ZYLOPRIM) tablet 100 mg  100 mg Oral DAILY    tamsulosin (FLOMAX) capsule 0.4 mg  0.4 mg Oral DAILY    nicotine (NICODERM CQ) 21 mg/24 hr patch 1 Patch  1 Patch TransDERmal DAILY PRN    OLANZapine (ZyPREXA) tablet 5 mg  5 mg Oral Q6H PRN    sodium chloride (NS) flush 5-40 mL  5-40 mL IntraVENous Q8H    sodium chloride (NS) flush 5-40 mL  5-40 mL IntraVENous PRN    diphenhydrAMINE (BENADRYL) capsule 25 mg  25 mg Oral Q4H PRN  ondansetron (ZOFRAN) injection 4 mg  4 mg IntraVENous Q4H PRN    enoxaparin (LOVENOX) injection 40 mg  40 mg SubCUTAneous Q24H    pantoprazole (PROTONIX) tablet 40 mg  40 mg Oral ACB    alum-mag hydroxide-simeth (MYLANTA) oral suspension 30 mL  30 mL Oral Q4H PRN    levothyroxine (SYNTHROID) tablet 100 mcg  100 mcg Oral ACB    oxyCODONE IR (ROXICODONE) tablet 10 mg  10 mg Oral Q6H PRN

## 2019-06-10 NOTE — PROGRESS NOTES
Gigi Marino [facility] 79  [address] [facility], [place], [address] [place] [address]  [phone]      Medical Progress Note      NAME: Sarah Gudino   :  1965  MRM:  [id number]    Date/Time: 6/10/2019         Subjective:     Chief Complaint:  Patient was seen and examined by me. Chart reviewed. Saw patient with wound care. Left foot wound looks better       Objective:       Vitals:       Last 24hrs VS reviewed since prior progress note. Most recent are:    Visit Vitals  /75 (BP 1 Location: Right arm, BP Patient Position: At rest)   Pulse 74   Temp 98.1 °F (36.7 °C)   Resp 18   Ht 6' 1\" (1.854 m)   Wt 108.2 kg (238 lb 9.6 oz)   SpO2 94%   BMI 31.48 kg/m²     SpO2 Readings from Last 6 Encounters:   06/10/19 94%   19 95%   19 99%   05/10/19 92%   10/12/18 96%   18 95%            Intake/Output Summary (Last 24 hours) at 6/10/2019 1301  Last data filed at 6/10/2019 6664  Gross per 24 hour   Intake --   Output 550 ml   Net -550 ml        Exam:     Physical Exam:    Gen:  Disheveled, ill-appearing, obese, NAD  HEENT:  Pink conjunctivae, PERRL, hearing intact to voice, moist mucous membranes  Neck:  Supple, without masses, thyroid non-tender  Resp:  No accessory muscle use, clear breath sounds without wheezes rales or rhonchi  Card:  No murmurs, normal S1, S2 without thrills, trace edema  Abd:  Soft, non-tender, non-distended, normoactive bowel sounds are present  Musc:  No cyanosis or clubbing  Skin:  L foot medial and dorsal ulcers with clean margin.   Less drainage  Neuro:  Cranial nerves 3-12 are grossly intact, chronic L sided weakness, follows commands appropriately  Psych:  Fair insight, oriented to person, place and time, alert    Medications Reviewed: (see below)    Lab Data Reviewed: (see below)    ______________________________________________________________________    Medications:     Current Facility-Administered Medications   Medication Dose Route Frequency    cefTRIAXone (ROCEPHIN) 2 g in 0.9% sodium chloride (MBP/ADV) 50 mL  2 g IntraVENous Q24H    TIGEcycline (TYGACIL) 50 mg in 0.9% sodium chloride (MBP/ADV) 100 mL  50 mg IntraVENous Q12H    psyllium husk-aspartame (METAMUCIL FIBER) packet 1 Packet  1 Packet Oral BID    gabapentin (NEURONTIN) capsule 300 mg  300 mg Oral TID    sodium hypochlorite (QUARTER STRENGTH DAKIN'S) 0.125% irrigation (bottle)   Topical EVERY OTHER DAY    polyethylene glycol (MIRALAX) packet 17 g  17 g Oral DAILY    acetaminophen (TYLENOL) tablet 650 mg  650 mg Oral Q4H PRN    melatonin tablet 3 mg  3 mg Oral QHS PRN    methadone (DOLOPHINE) 10 mg/mL concentrated solution 130 mg  130 mg Oral DAILY    colchicine tablet 0.6 mg  0.6 mg Oral DAILY PRN    lisinopril (PRINIVIL, ZESTRIL) tablet 40 mg  40 mg Oral DAILY    amLODIPine (NORVASC) tablet 10 mg  10 mg Oral DAILY    senna-docusate (PERICOLACE) 8.6-50 mg per tablet 1 Tab  1 Tab Oral DAILY    sertraline (ZOLOFT) tablet 50 mg  50 mg Oral DAILY    allopurinol (ZYLOPRIM) tablet 100 mg  100 mg Oral DAILY    tamsulosin (FLOMAX) capsule 0.4 mg  0.4 mg Oral DAILY    nicotine (NICODERM CQ) 21 mg/24 hr patch 1 Patch  1 Patch TransDERmal DAILY PRN    OLANZapine (ZyPREXA) tablet 5 mg  5 mg Oral Q6H PRN    sodium chloride (NS) flush 5-40 mL  5-40 mL IntraVENous Q8H    sodium chloride (NS) flush 5-40 mL  5-40 mL IntraVENous PRN    diphenhydrAMINE (BENADRYL) capsule 25 mg  25 mg Oral Q4H PRN    ondansetron (ZOFRAN) injection 4 mg  4 mg IntraVENous Q4H PRN    enoxaparin (LOVENOX) injection 40 mg  40 mg SubCUTAneous Q24H    pantoprazole (PROTONIX) tablet 40 mg  40 mg Oral ACB    alum-mag hydroxide-simeth (MYLANTA) oral suspension 30 mL  30 mL Oral Q4H PRN    levothyroxine (SYNTHROID) tablet 100 mcg  100 mcg Oral ACB    oxyCODONE IR (ROXICODONE) tablet 10 mg  10 mg Oral Q6H PRN          Lab Review:     Recent Labs     06/09/19  0223   WBC 8.9   HGB 11.0*   HCT 35.6*        Recent Labs     06/09/19  0223      K 4.6      CO2 30   *   BUN 30*   CREA 0.83   CA 8.4*   MG 2.0   PHOS 4.0   ALB 3.3*   TBILI 0.2   SGOT 22   ALT 23     Lab Results   Component Value Date/Time    Glucose (POC) 150 (H) 03/19/2018 11:45 AM    Glucose (POC) 123 (H) 03/18/2018 08:55 PM    Glucose (POC) 96 03/16/2018 04:58 PM    Glucose (POC) 116 (H) 12/12/2012 12:42 PM    Glucose (POC) 99 12/11/2012 11:27 PM          Assessment / Plan: Active Problems:    47 yo hx of HTN, CVA w/ L hemiparesis, bladder CA, depression, chronic pain, presented w/ L foot wound infection    1) Acute on chronic L foot wound infection: Now much improved. S/p I&D by podiatry on 05/25. Wound cx with stenotrophomonas, proteus, klebsiella. S/p 10 days of IV CTX/tigecycline, ended on 06/10. The patient will f/u with [facility] wound center and with Dr. Blanca Price (Vascular) for saphenous vein ablation. Will cont wound care. Awaiting SNF    2) HTN: BP stable. Cont norvasc, lisinopril    3) Hypothyroid: TSH elevated but patient was non-compliance. Will cont synthroid. Repeat TFTs in 4 weeks    4) Chronic pain/depression: cont methadone, SSRI    5) Hx of bladder CA: s/p surgery in 04/2018 at [place]. Will need to f/u with his Urologist.  Cont flomax    6) Hx of hemorrhagic CVA w/ L sided hemiparesis: cont PT/OT.   Awaiting SNF placement    Total time spent with patient: 20 min                  Care Plan discussed with: Patient, nursing, wound care, ID    Discussed:  Care Plan    Prophylaxis:  Lovenox    Disposition:  SNF/LTC pending           ___________________________________________________    Attending Physician: Irena Taylor MD

## 2019-06-10 NOTE — WOUND CARE
Wound care follow up to assess left foot wounds with Dr MENG and DR Kiki Denver- pain med per staff nurse prior to care given. Assessment  Left foot dressings removed for assessment, cleansed with 1/4 strength dakins-   Left lateral heel - 4x3x0.1 cm full thickness, yellow slough decreased, drainage decreased- pale pink granulation - ludwig wound intact, no redness, skin is dry and scaling  Left dorsal foot - 2x2x0.1 cm full thickness wound, red base, less yellow granulation-less drainage. No odor. Treatment  Wounds cleansed - saline moistened hydrofera blue to wounds, covered with moist 4x4, dry 4x4 and kerlix- Tubigrip applied, prevalon boot and elevated    Plan of care discussed with Patient, MD and   Orders changed- modified.   Will follow  [address]

## 2019-06-10 NOTE — PROGRESS NOTES
Bedside and Verbal shift change report given to EMERY Fischer (oncoming nurse) by Wendy Paredes (offgoing nurse). Report included the following information SBAR, Kardex, Procedure Summary, Intake/Output, MAR, Recent Results and Med Rec Status.

## 2019-06-11 PROCEDURE — 74011250637 HC RX REV CODE- 250/637: Performed by: INTERNAL MEDICINE

## 2019-06-11 PROCEDURE — 65270000029 HC RM PRIVATE

## 2019-06-11 PROCEDURE — 74011250636 HC RX REV CODE- 250/636: Performed by: INTERNAL MEDICINE

## 2019-06-11 PROCEDURE — 97116 GAIT TRAINING THERAPY: CPT

## 2019-06-11 RX ADMIN — OXYCODONE HYDROCHLORIDE 10 MG: 5 TABLET ORAL at 15:41

## 2019-06-11 RX ADMIN — OXYCODONE HYDROCHLORIDE 10 MG: 5 TABLET ORAL at 09:17

## 2019-06-11 RX ADMIN — ACETAMINOPHEN 650 MG: 325 TABLET ORAL at 23:51

## 2019-06-11 RX ADMIN — OXYCODONE HYDROCHLORIDE 10 MG: 5 TABLET ORAL at 21:54

## 2019-06-11 RX ADMIN — GABAPENTIN 300 MG: 100 CAPSULE ORAL at 15:41

## 2019-06-11 RX ADMIN — PANTOPRAZOLE SODIUM 40 MG: 40 TABLET, DELAYED RELEASE ORAL at 06:40

## 2019-06-11 RX ADMIN — TAMSULOSIN HYDROCHLORIDE 0.4 MG: 0.4 CAPSULE ORAL at 09:17

## 2019-06-11 RX ADMIN — LEVOTHYROXINE SODIUM 100 MCG: 100 TABLET ORAL at 06:40

## 2019-06-11 RX ADMIN — GABAPENTIN 300 MG: 100 CAPSULE ORAL at 09:17

## 2019-06-11 RX ADMIN — Medication 10 ML: at 21:56

## 2019-06-11 RX ADMIN — ALLOPURINOL 100 MG: 100 TABLET ORAL at 09:17

## 2019-06-11 RX ADMIN — METHADONE HYDROCHLORIDE 130 MG: 10 CONCENTRATE ORAL at 09:16

## 2019-06-11 RX ADMIN — ACETAMINOPHEN 650 MG: 325 TABLET ORAL at 12:58

## 2019-06-11 RX ADMIN — AMLODIPINE BESYLATE 10 MG: 5 TABLET ORAL at 09:17

## 2019-06-11 RX ADMIN — Medication 10 ML: at 06:00

## 2019-06-11 RX ADMIN — OXYCODONE HYDROCHLORIDE 10 MG: 5 TABLET ORAL at 03:02

## 2019-06-11 RX ADMIN — ACETAMINOPHEN 650 MG: 325 TABLET ORAL at 06:40

## 2019-06-11 RX ADMIN — SERTRALINE HYDROCHLORIDE 50 MG: 50 TABLET ORAL at 09:17

## 2019-06-11 RX ADMIN — LISINOPRIL 40 MG: 20 TABLET ORAL at 09:17

## 2019-06-11 RX ADMIN — SENNOSIDES AND DOCUSATE SODIUM 1 TABLET: 8.6; 5 TABLET ORAL at 09:17

## 2019-06-11 RX ADMIN — MELATONIN TAB 3 MG 3 MG: 3 TAB at 03:02

## 2019-06-11 RX ADMIN — ENOXAPARIN SODIUM 40 MG: 40 INJECTION SUBCUTANEOUS at 03:02

## 2019-06-11 RX ADMIN — GABAPENTIN 300 MG: 100 CAPSULE ORAL at 21:54

## 2019-06-11 NOTE — PROGRESS NOTES
Gigi Marino sulema [place] [address]  [address] [facility], [facility], [address] [facility]  [phone]      Medical Progress Note      NAME:         Yaw Morales   :        1965  MRM:        [id number]    Date:          2019      Subjective: Patient has been seen and examined as a follow up for multiple medical issues. Chart, labs, diagnostics reviewed. He remains weak but attempting to ambulate. No new symptoms. Objective:    Vital Signs:    Visit Vitals  /67 (BP 1 Location: Right arm, BP Patient Position: At rest)   Pulse (!) 56   Temp 98 °F (36.7 °C)   Resp 17   Ht 6' 1\" (1.854 m)   Wt 108.2 kg (238 lb 9.6 oz)   SpO2 92%   BMI 31.48 kg/m²          Intake/Output Summary (Last 24 hours) at 2019 1511  Last data filed at 2019 1358  Gross per 24 hour   Intake 440 ml   Output 450 ml   Net -10 ml        Physical Examination:    General:   Weak looking and not in any acute distress. Eyes:   pink conjunctivae, PERRLA with no discharge. ENT:   no ottorrhea or rhinorrhea with moist mucous membranes  Pulm: clear breath sounds without crackles or wheezes  Card:  no JVD or murmurs, has regular and normal S1, S2 without thrills, bruits or peripheral edema  Abd:  Soft, non-tender, non-distended, normoactive bowel sounds with no palpable organomegaly  Musc:  No cyanosis, clubbing, atrophy or deformities. Skin:  No rashes, bruising or ulcers. Dressed L foot  Neuro: Awake and alert. L hemiparesis.  Follows commands appropriately  Psych:  Has a good insight and is oriented x 3    Current Facility-Administered Medications   Medication Dose Route Frequency    psyllium husk-aspartame (METAMUCIL FIBER) packet 1 Packet  1 Packet Oral BID    gabapentin (NEURONTIN) capsule 300 mg  300 mg Oral TID    polyethylene glycol (MIRALAX) packet 17 g  17 g Oral DAILY    acetaminophen (TYLENOL) tablet 650 mg  650 mg Oral Q4H PRN    melatonin tablet 3 mg  3 mg Oral QHS PRN    methadone (DOLOPHINE) 10 mg/mL concentrated solution 130 mg  130 mg Oral DAILY    colchicine tablet 0.6 mg  0.6 mg Oral DAILY PRN    lisinopril (PRINIVIL, ZESTRIL) tablet 40 mg  40 mg Oral DAILY    amLODIPine (NORVASC) tablet 10 mg  10 mg Oral DAILY    senna-docusate (PERICOLACE) 8.6-50 mg per tablet 1 Tab  1 Tab Oral DAILY    sertraline (ZOLOFT) tablet 50 mg  50 mg Oral DAILY    allopurinol (ZYLOPRIM) tablet 100 mg  100 mg Oral DAILY    tamsulosin (FLOMAX) capsule 0.4 mg  0.4 mg Oral DAILY    nicotine (NICODERM CQ) 21 mg/24 hr patch 1 Patch  1 Patch TransDERmal DAILY PRN    OLANZapine (ZyPREXA) tablet 5 mg  5 mg Oral Q6H PRN    sodium chloride (NS) flush 5-40 mL  5-40 mL IntraVENous Q8H    sodium chloride (NS) flush 5-40 mL  5-40 mL IntraVENous PRN    diphenhydrAMINE (BENADRYL) capsule 25 mg  25 mg Oral Q4H PRN    ondansetron (ZOFRAN) injection 4 mg  4 mg IntraVENous Q4H PRN    enoxaparin (LOVENOX) injection 40 mg  40 mg SubCUTAneous Q24H    pantoprazole (PROTONIX) tablet 40 mg  40 mg Oral ACB    alum-mag hydroxide-simeth (MYLANTA) oral suspension 30 mL  30 mL Oral Q4H PRN    levothyroxine (SYNTHROID) tablet 100 mcg  100 mcg Oral ACB    oxyCODONE IR (ROXICODONE) tablet 10 mg  10 mg Oral Q6H PRN        Laboratory data and review:    Recent Labs     06/09/19 0223   WBC 8.9   HGB 11.0*   HCT 35.6*        Recent Labs     06/09/19 0223      K 4.6      CO2 30   *   BUN 30*   CREA 0.83   CA 8.4*   MG 2.0   PHOS 4.0   ALB 3.3*   SGOT 22   ALT 23     No components found for: GLPOC    Assessment and Plan:    45 yo hx of HTN, CVA w/ L hemiparesis, bladder CA, depression, chronic pain, presented w/ L foot wound infection     Acute on chronic L foot wound infection: Now much improved. S/p I&D by podiatry on 05/25. Wound cx with stenotrophomonas, proteus, klebsiella. Completed a 10 day course of IV CTX/tigecycline, ended on 06/10.   The patient will f/u with [facility] wound center and with Dr. Hyla Burkitt (Vascular) for saphenous vein ablation. Will cont wound care. Awaiting SNF     HTN: BP stable. Cont norvasc, lisinopril     Hypothyroid: TSH elevated but patient was non-compliance. Will cont synthroid. Repeat TFTs in 4 weeks     Chronic pain/depression: cont methadone, SSRI     Hx of bladder CA: s/p surgery in 04/2018 at [facility]. Will need to f/u with his Urologist.  Cont flomax     Hx of hemorrhagic CVA w/ L sided hemiparesis: cont PT/OT.  Still  awaiting SNF placement     Total time spent for the patient's care: [address] discussed with: Patient, Care Manager and Nursing Staff    Discussed:  Care Plan and D/C Planning    Prophylaxis:  Lovenox    Anticipated Disposition:  SNF/LTC           ___________________________________________________    Attending Physician:   Gurpreet Harrington MD

## 2019-06-11 NOTE — PROGRESS NOTES
Nutrition Assessment:    RECOMMENDATIONS/INTERVENTION(S):   1. Continue with Regular diet order. 2. Will order Ten and Gelatein BID for additional 40gm protein/day. 3. Monitor Po intakes of meals/ ONS, protein intake, weight, BM's. ASSESSMENT:   6/11: Follow up. Pt eating well. Continue ONS for large stature and increased need for protein. Awaiting placement. Insurance and placement have been an issue. PO is good, pt eating >75% of meals on average. No new weight taken since admission. Reweigh pt monitor trends. Labs reviewed. 6/4: 45 yo male admitted for open wound. Consult received for increased protein needs. PMhx: CVA, bladder CA, gout, HTN, hypercholesterolemia, seizures, ETOH abuse, chronic pain and methadone use. Class I obese per BMI. Weight has increased by 15lbs over last year per wt hx in EMR. Regular diet ordered. Pt reports very good appetite here and PTA. Intakes recorded as 75-90%. Pt has questions regarding his protein intake and how to \"increase his protein levels\". Educated pt on what foods contain protein and discussed supplement options for additional protein. Pt interested in protein supplements that do not contain a lot of calories because he does not want to keep gaining weight. Willing to try Ten and [address] Left foot ulcer s/p I &D on 5/25. Discussed different types of fiber and what foods they are found in to help with regular BM's. Pt states he takes chronic pain meds and therefore has to take medicine to help him have BM's but now he is c/o of his stools being too soft. Labs and meds reviewed. Diet Order: Regular  % Eaten:    Patient Vitals for the past 72 hrs:   % Diet Eaten   06/11/19 1358 85 %       Pertinent Medications: [x] Reviewed    Labs: [x] Reviewed    Anthropometrics: Height: 6' 1\" (185.4 cm) Weight: 108.2 kg (238 lb 9.6 oz)    IBW (%IBW):   ( ) UBW (%UBW):   (  %)      BMI: Body mass index is 31.48 kg/m².     This BMI is indicative of:   [] Underweight    [] Normal    [] Overweight    [x]  Class I Obesity    []  Extreme Obesity (BMI>40)  Estimated Nutrition Needs (Based on): 0665 Kcals/day(BMR 1982 x AF 1.3) , 109 g(109-120gm (1-1.1gm/kg/d)) Protein  Carbohydrate: At Least 130 g/day  Fluids: 2576 mL/day (1 ml/kcal)    Last BM: 6/11   [x]Active     []Hyperactive  []Hypoactive       [] Absent   BS  Skin:    [] Intact   [] Incision  [x] Breakdown - left foot ulcer  [] DTI   [] Tears/Excoriation/Abrasion  []Edema [] Other:    Wt Readings from Last 30 Encounters:   06/05/19 108.2 kg (238 lb 9.6 oz)   05/20/19 108.9 kg (240 lb)   04/25/19 108.9 kg (240 lb)   04/24/19 108.9 kg (240 lb)   09/28/18 103.4 kg (228 lb)   09/28/18 103.4 kg (228 lb)   09/05/18 101.2 kg (223 lb)   08/08/18 101.2 kg (223 lb)   07/31/18 104.3 kg (230 lb)   06/05/18 102.3 kg (225 lb 8.5 oz)   06/04/18 104.3 kg (230 lb)   03/13/18 100.4 kg (221 lb 5.5 oz)   03/14/18 100.2 kg (221 lb)   03/06/18 106.1 kg (234 lb)   12/21/17 94.3 kg (208 lb)   08/13/17 94.3 kg (208 lb)   01/27/17 99.2 kg (218 lb 9.6 oz)   01/06/17 99.3 kg (219 lb)   12/13/16 105.1 kg (231 lb 12.8 oz)   10/14/16 102 kg (224 lb 13.9 oz)   09/10/16 108.4 kg (239 lb)   06/02/16 108.5 kg (239 lb 4 oz)   11/09/15 101.6 kg (224 lb)   02/12/13 109.8 kg (242 lb)   02/07/13 108.9 kg (240 lb)   01/30/13 104.3 kg (230 lb)   12/12/12 104.5 kg (230 lb 6.4 oz)   08/30/12 105.2 kg (232 lb)   04/10/12 107 kg (236 lb)   01/25/12 107 kg (236 lb)      NUTRITION DIAGNOSES:   Problem:  Increased nutrient needs     Etiology: related to increased demand for protein     Signs/Symptoms: as evidenced by left foot ulcer requiring multiple I &D's      NUTRITION INTERVENTIONS:  Meals/Snacks: General/healthful diet   Supplements: Commercial supplement              GOAL:   Consume > 75% all meals + ONS and one protein source/meal to meet estimated protein needs within next 4-6 days    Cultural, Yazdanism, or Ethnic Dietary Needs: None EDUCATION & DISCHARGE NEEDS:    [] None Identified   [x] Identified and Education Provided/Documented- educated pt on protein content of foods and fiber   [] Identified and Pt declined/was not appropriate      [x] Interdisciplinary Care Plan Reviewed/Documented    [x] Discharge Needs:   Follow high protein diet at home to help with wound healing   [] No Nutrition Related Discharge Needs    NUTRITION RISK:   Pt Is At Nutrition Risk  [x]     No Nutrition Risk Identified  []       PT SEEN FOR:    []  MD Consult: []Calorie Count      []Diabetic Diet Education        []Diet Education     []Electrolyte Management     []General Nutrition Management and Supplements     []Management of Tube Feeding     []TPN Recommendations    []  RN Referral:  []MST score >=2     []Enteral/Parenteral Nutrition PTA     []Pregnant: Gestational DM or Multigestation                 [] Pressure Ulcer    []  Low BMI      []  Length of Stay       [] Dysphagia Diet         [] Ventilator  [x]  Follow-up     Previous Recommendations:   [x] Implemented          [] Not Implemented          [] Not Applicable    Previous Goal:   [x] Met              [] Progressing Towards Goal              [] Not Progressing Towards Goal   [] Not Applicable            Claudell Lessen, [address] [place] [address]  Pager [phone]  Phone [phone]

## 2019-06-11 NOTE — PROGRESS NOTES
[facility] Progress Note           Erin Olivia DO    [phone] Office  [phone]  Fax    2019      Assessment & Plan:   1. Chronic left foot wound status post incision and drainage on 2019. Previous cultures grew Stenotrophomonas maltophilia, Enterococcus, and Providencia. Repeat wound cultures growing SMALT, proteus, and klebsiella. Currently on tigecycline and ceftriaxone. Unfortunately due to patients allergies there are no PO abx options other than omadacycline (new tetracycline abx) which insurance will not cover due to cost. D/w Dr Terrie Dove. Recommend o/p follow up with [facility] for wound care/compression and follow up with Dr Terrie Dove in office for saphenous vein ablation (which cannot be done in hospital due to lack of equipment) Completed 10 days tigecycline/ceftriaxone Monday 6/10  2. Multiple antibiotic allergies including vancomycin, sulfa, and ciprofloxacin. 3. History of alcohol abuse. 4. Chronic pain. The patient is on methadone for chronic pain. He denies a history of drug abuse. 5. History of bladder cancer. 6. History of hemorrhagic stroke with left-sided hemiparesis.     ID service will sign off. Please call if needed            Subjective:     No complaints    Objective:     Vitals:   Visit Vitals  /67 (BP 1 Location: Right arm, BP Patient Position: At rest)   Pulse (!) 56   Temp 98 °F (36.7 °C)   Resp 17   Ht 6' 1\" (1.854 m)   Wt 108.2 kg (238 lb 9.6 oz)   SpO2 92%   BMI 31.48 kg/m²        Tmax:  Temp (24hrs), Av.9 °F (36.6 °C), Min:97.6 °F (36.4 °C), Max:98.5 °F (36.9 °C)      Exam:   Patient is intubated:  no    Physical Examination:   General:  Alert, cooperative, no distress   Head:  Normocephalic, atraumatic. Eyes:  Conjunctivae clear   Neck: Supple       Lungs:   No distress.     Chest wall:     Heart:     Abdomen:      Extremities: Leg dressed   Skin:  No rash   Neurologic:      Labs:        No lab exists for component: ITNL   No results for input(s): CPK, CKMB, TROIQ in the last 72 hours. Recent Labs     06/09/19  0223      K 4.6      CO2 30   BUN 30*   CREA 0.83   *   PHOS 4.0   MG 2.0   ALB 3.3*   WBC 8.9   HGB 11.0*   HCT 35.6*        No results for input(s): INR, PTP, APTT in the last 72 hours. No lab exists for component: INREXT, INREXT  Needs: urine analysis, urine sodium, protein and creatinine  No results found for: VIVEK, CREAU      Cultures:     Lab Results   Component Value Date/Time    Specimen Description: BLOOD 02/04/2010 10:55 PM    Specimen Description: BLOOD 02/12/2009 11:30 PM     Lab Results   Component Value Date/Time    Culture result: HEAVY DIPHTHEROIDS (A) 05/30/2019 03:00 PM    Culture result: (A) 05/30/2019 03:00 PM     LIGHT STENOTROPHOMONAS (Lenn Laquey.) MALTOPHILIA CLSI GUIDELINES DO NOT RECOMMEND REPORTING SUSCEPTIBILITIES FOR S. MALTOPHILIA. TRIMETHOPRIM/SULFAMETHOXAZOLE IS THE DRUG OF CHOICE.     Culture result: FEW PROTEUS MIRABILIS (A) 05/30/2019 03:00 PM    Culture result: FEW KLEBSIELLA PNEUMONIAE (A) 05/30/2019 03:00 PM       Radiology:     Medications       Current Facility-Administered Medications   Medication Dose Route Frequency Last Dose    psyllium husk-aspartame (METAMUCIL FIBER) packet 1 Packet  1 Packet Oral BID 1 Packet at 06/10/19 1713    gabapentin (NEURONTIN) capsule 300 mg  300 mg Oral  mg at 06/11/19 0917    polyethylene glycol (MIRALAX) packet 17 g  17 g Oral DAILY 17 g at 06/08/19 0845    acetaminophen (TYLENOL) tablet 650 mg  650 mg Oral Q4H  mg at 06/11/19 0640    melatonin tablet 3 mg  3 mg Oral QHS PRN 3 mg at 06/11/19 0302    methadone (DOLOPHINE) 10 mg/mL concentrated solution 130 mg  130 mg Oral DAILY 130 mg at 06/11/19 0916    colchicine tablet 0.6 mg  0.6 mg Oral DAILY PRN 0.6 mg at 06/06/19 0927    lisinopril (PRINIVIL, ZESTRIL) tablet 40 mg  40 mg Oral DAILY 40 mg at 06/11/19 0917    amLODIPine (NORVASC) tablet 10 mg  10 mg Oral DAILY 10 mg at 06/11/19 0917    senna-docusate (PERICOLACE) 8.6-50 mg per tablet 1 Tab  1 Tab Oral DAILY 1 Tab at 06/11/19 0917    sertraline (ZOLOFT) tablet 50 mg  50 mg Oral DAILY 50 mg at 06/11/19 0917    allopurinol (ZYLOPRIM) tablet 100 mg  100 mg Oral DAILY 100 mg at 06/11/19 0917    tamsulosin (FLOMAX) capsule 0.4 mg  0.4 mg Oral DAILY 0.4 mg at 06/11/19 0917    nicotine (NICODERM CQ) 21 mg/24 hr patch 1 Patch  1 Patch TransDERmal DAILY PRN      OLANZapine (ZyPREXA) tablet 5 mg  5 mg Oral Q6H PRN      sodium chloride (NS) flush 5-40 mL  5-40 mL IntraVENous Q8H 10 mL at 06/11/19 0600    sodium chloride (NS) flush 5-40 mL  5-40 mL IntraVENous PRN 10 mL at 06/02/19 0419    diphenhydrAMINE (BENADRYL) capsule 25 mg  25 mg Oral Q4H PRN      ondansetron (ZOFRAN) injection 4 mg  4 mg IntraVENous Q4H PRN 4 mg at 06/03/19 0653    enoxaparin (LOVENOX) injection 40 mg  40 mg SubCUTAneous Q24H 40 mg at 06/11/19 0302    pantoprazole (PROTONIX) tablet 40 mg  40 mg Oral ACB 40 mg at 06/11/19 0640    alum-mag hydroxide-simeth (MYLANTA) oral suspension 30 mL  30 mL Oral Q4H PRN 30 mL at 05/31/19 0405    levothyroxine (SYNTHROID) tablet 100 mcg  100 mcg Oral  mcg at 06/11/19 0640    oxyCODONE IR (ROXICODONE) tablet 10 mg  10 mg Oral Q6H PRN 10 mg at 06/11/19 3030           Case discussed with:NILDA Valentin DO

## 2019-06-11 NOTE — PROGRESS NOTES
CM received response from [address] of [facility]. Facility unable to accept Pt due to Pt having Lisa only. Update: [place] [facility] unable to accept, no medicaid beds available. CM sent referrals to [facility] and [place] via [address]. CM will continue to follow for discharge planning.        Aris Lubin, [address] [place] [address]

## 2019-06-11 NOTE — PROGRESS NOTES
Problem: Mobility Impaired (Adult and Pediatric)  Goal: *Acute Goals and Plan of Care (Insert Text)  Description  Physical Therapy Goals  Revised 6/11/2019  1. Patient will move from supine to sit and sit to supine  in bed with modified independence within 7 day(s). 2.  Patient will transfer from bed to chair and chair to bed with supervision/set-up using the least restrictive device within 7 day(s). 3.  Patient will perform sit to stand with supervision/set-up within 7 day(s). 4.  Patient will ambulate with supervision/set-up for 100 feet with the least restrictive device within 7 day(s). 5.  Patient will ascend/descend 2 stairs with  handrail(s) with minimal assistance/contact guard assist within 7 day(s). Physical Therapy Goals  Initiated 5/31/2019  1. Patient will move from supine to sit and sit to supine , scoot up and down and roll side to side in bed with modified independence within 7 day(s). 2.  Patient will transfer from bed to chair and chair to bed with modified independence using the least restrictive device within 7 day(s). 3.  Patient will perform sit to stand with modified independence within 7 day(s). 4.  Patient will ambulate with modified independence for 200 feet with the least restrictive device within 7 day(s). 5.  Patient will ascend/descend 2 stairs with  handrail(s) with modified independence within 7 day(s). Outcome: Progressing Towards Goal   PHYSICAL THERAPY TREATMENT: WEEKLY REASSESSMENT  Patient: Jenni Sharma (4 y.o. male)  Date: 6/11/2019  Diagnosis: Open wound, lower leg [S81.809A] <principal problem not specified>       Precautions: Fall, WBAT(L foot)  Chart, physical therapy assessment, plan of care and goals were reviewed. ASSESSMENT:  Pt received supine in bed with Prevalon boot on R foot. R foot ace wrapped with dsg. Pt reporting continued severe pain in foot.   Has pmhx of CVA with L UE HP.  LUE is non-functional with extension synergy and wrist/hand flexion contracture. LLE likewise with extensor synergy with minimal movement L foot/ankle set in DF. With chronic R foot ulcer. Has cast shoe in need of custom modifications to promote healing. Pt stating that he walks to bathroom ad dionte with Prevalon boot in place using SPC with is highly not recommended. Pt not willing to call nursing staff for assistance, despite high fall risk. Pt supine to sit with SBA. Donned cast shoe for pt. Sit to stand with CGA with SPC. Gait of 10' with CGA c/o of severe L foot pain in wt bearing. DANY is very wide. Recommending use of noe-walker vs SPC to allow unloading of LLE and improve safety and balance. Pt willing to try this device next tx. Pt lives alone. Recommend setting whereby he can received needed assistance. Patient's progression toward goals since last assessment: minimal     PLAN:  Goals have been updated based on progression since last assessment. Patient continues to benefit from skilled intervention to address the above impairments. Continue to follow the patient 5 times a week to address goals. Planned Interventions:  ?              Bed Mobility Training             ? Neuromuscular Re-Education  ? Transfer Training                   ? Orthotic/Prosthetic Training  ? Gait Training                         ? Modalities  ? Therapeutic Exercises           ? Edema Management/Control  ? Therapeutic Activities            ? Patient and Family Training/Education  ? Other (comment):  Discharge Recommendations: Kranthi Selbyuel or RITO sitting  Further Equipment Recommendations for Discharge: tbd; noe-walker? SUBJECTIVE:   Patient stated ? I will try a noe=walker. ?    OBJECTIVE DATA SUMMARY:   Critical Behavior:  Neurologic State: Alert  Orientation Level: Oriented X4  Cognition: Follows commands  Safety/Judgement: Awareness of environment, Fall prevention, Insight into deficits    Strength:   Strength: Generally decreased, functional                      Functional Mobility Training:  Bed Mobility:     Supine to Sit: Supervision; Additional time  Sit to Supine: Supervision; Additional time  Scooting: Stand-by assistance        Transfers:  Sit to Stand: Stand-by assistance  Stand to Sit: Stand-by assistance                             Balance:  Sitting: Intact  Standing: Impaired  Standing - Static: Good  Standing - Dynamic : Fair;Constant support  Ambulation/Gait Training:  Distance (ft): 10 Feet (ft)  Assistive Device: Gait belt;Cane, straight  Ambulation - Level of Assistance: Contact guard assistance;Stand-by assistance        Gait Abnormalities: Antalgic;Decreased step clearance; Hemiplegic; Step to gait        Base of Support: Shift to right     Speed/Reva: Slow;Pace decreased (<100 feet/min)  Step Length: Right shortened;Left shortened           Pain:   10/10 L foot                 Activity Tolerance:   fair  Please refer to the flowsheet for vital signs taken during this treatment. After treatment:   ?  Patient left in no apparent distress sitting up in chair  ? Patient left in no apparent distress in bed  ? Call bell left within reach  ? Nursing notified  ? Caregiver present  ?   Bed alarm activated    COMMUNICATION/COLLABORATION:   The patient?s plan of care was discussed with: Registered Nurse    Mehdi Ya, PT   Time Calculation: 18 mins

## 2019-06-12 PROCEDURE — 74011250637 HC RX REV CODE- 250/637: Performed by: INTERNAL MEDICINE

## 2019-06-12 PROCEDURE — 74011250636 HC RX REV CODE- 250/636: Performed by: INTERNAL MEDICINE

## 2019-06-12 PROCEDURE — 97116 GAIT TRAINING THERAPY: CPT

## 2019-06-12 PROCEDURE — 65270000029 HC RM PRIVATE

## 2019-06-12 PROCEDURE — 97530 THERAPEUTIC ACTIVITIES: CPT

## 2019-06-12 RX ADMIN — ALLOPURINOL 100 MG: 100 TABLET ORAL at 09:42

## 2019-06-12 RX ADMIN — AVOBENZONE, HOMOSALATE, OCTISALATE, OCTOCRYLENE, AND OXYBENZONE 1 PACKET: 29.4; 147; 49; 25.4; 58.8 LOTION TOPICAL at 18:00

## 2019-06-12 RX ADMIN — TAMSULOSIN HYDROCHLORIDE 0.4 MG: 0.4 CAPSULE ORAL at 09:42

## 2019-06-12 RX ADMIN — AVOBENZONE, HOMOSALATE, OCTISALATE, OCTOCRYLENE, AND OXYBENZONE 1 PACKET: 29.4; 147; 49; 25.4; 58.8 LOTION TOPICAL at 09:42

## 2019-06-12 RX ADMIN — PANTOPRAZOLE SODIUM 40 MG: 40 TABLET, DELAYED RELEASE ORAL at 06:07

## 2019-06-12 RX ADMIN — OXYCODONE HYDROCHLORIDE 10 MG: 5 TABLET ORAL at 09:42

## 2019-06-12 RX ADMIN — Medication 10 ML: at 21:42

## 2019-06-12 RX ADMIN — OXYCODONE HYDROCHLORIDE 10 MG: 5 TABLET ORAL at 03:32

## 2019-06-12 RX ADMIN — ACETAMINOPHEN 650 MG: 325 TABLET ORAL at 06:23

## 2019-06-12 RX ADMIN — GABAPENTIN 300 MG: 100 CAPSULE ORAL at 15:40

## 2019-06-12 RX ADMIN — OXYCODONE HYDROCHLORIDE 10 MG: 5 TABLET ORAL at 15:40

## 2019-06-12 RX ADMIN — GABAPENTIN 300 MG: 100 CAPSULE ORAL at 21:42

## 2019-06-12 RX ADMIN — SERTRALINE HYDROCHLORIDE 50 MG: 50 TABLET ORAL at 09:42

## 2019-06-12 RX ADMIN — LEVOTHYROXINE SODIUM 100 MCG: 100 TABLET ORAL at 06:07

## 2019-06-12 RX ADMIN — AMLODIPINE BESYLATE 10 MG: 5 TABLET ORAL at 09:42

## 2019-06-12 RX ADMIN — GABAPENTIN 300 MG: 100 CAPSULE ORAL at 09:42

## 2019-06-12 RX ADMIN — LISINOPRIL 40 MG: 20 TABLET ORAL at 09:42

## 2019-06-12 RX ADMIN — METHADONE HYDROCHLORIDE 130 MG: 10 CONCENTRATE ORAL at 09:42

## 2019-06-12 RX ADMIN — OXYCODONE HYDROCHLORIDE 10 MG: 5 TABLET ORAL at 21:42

## 2019-06-12 RX ADMIN — ENOXAPARIN SODIUM 40 MG: 40 INJECTION SUBCUTANEOUS at 03:32

## 2019-06-12 NOTE — PROGRESS NOTES
Bedside and Verbal shift change report given to Bailey Donald (oncoming nurse) by [address] [place] [address] (offgoing nurse). Report included the following information SBAR, Kardex, Intake/Output, MAR and Recent Results.

## 2019-06-12 NOTE — PROGRESS NOTES
Gigi Marino [facility] 79  [address] [facility], [place], [address] [place] [address]  [phone]      Medical Progress Note      NAME:         Lynn Trinidad   :        1965  MRM:        [id number]    Date:          2019      Subjective: Patient has been seen and examined as a follow up for multiple medical issues. Chart, labs, diagnostics reviewed. He remains weak but ambulate. No new symptoms. No SNF accepting    Objective:    Vital Signs:    Visit Vitals  /80 (BP 1 Location: Right arm, BP Patient Position: At rest)   Pulse 68   Temp 98.2 °F (36.8 °C)   Resp 16   Ht 6' 1\" (1.854 m)   Wt 108.2 kg (238 lb 9.6 oz)   SpO2 94%   BMI 31.48 kg/m²          Intake/Output Summary (Last 24 hours) at 2019 1117  Last data filed at 2019 0333  Gross per 24 hour   Intake 240 ml   Output 1150 ml   Net -910 ml        Physical Examination:    General:   Weak looking and not in any acute distress. Eyes:   pink conjunctivae, PERRLA with no discharge. ENT:   no ottorrhea or rhinorrhea with moist mucous membranes  Pulm: clear breath sounds without crackles or wheezes  Card:  no JVD or murmurs, has regular and normal S1, S2 without thrills, bruits or peripheral edema  Abd:  Soft, non-tender, non-distended, normoactive bowel sounds   Musc:  No cyanosis, clubbing, atrophy or deformities. Skin:  No rashes, bruising or ulcers. Dressed L foot  Neuro: Awake and alert. L hemiparesis.  Follows commands appropriately  Psych:  Has a good insight and is oriented x 3    Current Facility-Administered Medications   Medication Dose Route Frequency    psyllium husk-aspartame (METAMUCIL FIBER) packet 1 Packet  1 Packet Oral BID    gabapentin (NEURONTIN) capsule 300 mg  300 mg Oral TID    polyethylene glycol (MIRALAX) packet 17 g  17 g Oral DAILY    acetaminophen (TYLENOL) tablet 650 mg  650 mg Oral Q4H PRN    melatonin tablet 3 mg  3 mg Oral QHS PRN  methadone (DOLOPHINE) 10 mg/mL concentrated solution 130 mg  130 mg Oral DAILY    colchicine tablet 0.6 mg  0.6 mg Oral DAILY PRN    lisinopril (PRINIVIL, ZESTRIL) tablet 40 mg  40 mg Oral DAILY    amLODIPine (NORVASC) tablet 10 mg  10 mg Oral DAILY    senna-docusate (PERICOLACE) 8.6-50 mg per tablet 1 Tab  1 Tab Oral DAILY    sertraline (ZOLOFT) tablet 50 mg  50 mg Oral DAILY    allopurinol (ZYLOPRIM) tablet 100 mg  100 mg Oral DAILY    tamsulosin (FLOMAX) capsule 0.4 mg  0.4 mg Oral DAILY    nicotine (NICODERM CQ) 21 mg/24 hr patch 1 Patch  1 Patch TransDERmal DAILY PRN    OLANZapine (ZyPREXA) tablet 5 mg  5 mg Oral Q6H PRN    sodium chloride (NS) flush 5-40 mL  5-40 mL IntraVENous Q8H    sodium chloride (NS) flush 5-40 mL  5-40 mL IntraVENous PRN    diphenhydrAMINE (BENADRYL) capsule 25 mg  25 mg Oral Q4H PRN    ondansetron (ZOFRAN) injection 4 mg  4 mg IntraVENous Q4H PRN    enoxaparin (LOVENOX) injection 40 mg  40 mg SubCUTAneous Q24H    pantoprazole (PROTONIX) tablet 40 mg  40 mg Oral ACB    alum-mag hydroxide-simeth (MYLANTA) oral suspension 30 mL  30 mL Oral Q4H PRN    levothyroxine (SYNTHROID) tablet 100 mcg  100 mcg Oral ACB    oxyCODONE IR (ROXICODONE) tablet 10 mg  10 mg Oral Q6H PRN        Laboratory data and review:    No results for input(s): WBC, HGB, HCT, PLT, HGBEXT, HCTEXT, PLTEXT, HGBEXT, HCTEXT, PLTEXT in the last 72 hours. No results for input(s): NA, K, CL, CO2, GLU, BUN, CREA, CA, MG, PHOS, ALB, TBIL, SGOT, ALT, INR in the last 72 hours. No lab exists for component: INREXT, INREXT  No components found for: [place]    Assessment and Plan:    47 yo hx of HTN, CVA w/ L hemiparesis, bladder CA, depression, chronic pain, presented w/ L foot wound infection     Acute on chronic L foot wound infection: Now much improved. S/p I&D by podiatry on 05/25. Wound cx with stenotrophomonas, proteus, klebsiella. Completed a 10 day course of IV CTX/tigecycline, ended on 06/10.   The patient will f/u with [facility] wound center and with Dr. Chris Lopez (Vascular) for saphenous vein ablation. Will cont wound care. Awaiting a safe disposition      HTN: BP stable. Cont norvasc, lisinopril     Hypothyroid: TSH elevated but patient was non-compliance. Will cont synthroid. Repeat TFTs in 4 weeks     Chronic pain/depression: cont methadone, SSRI     Hx of bladder CA: s/p surgery in 04/2018 at [place].   Will need to f/u with his Urologist.  Cont flomax     Hx of hemorrhagic CVA w/ L sided hemiparesis: cont PT/OT and ambulate as tolerated      Total time spent for the patient's care: [address] discussed with: Patient, Care Manager and Nursing Staff    Discussed:  Care Plan and D/C Planning    Prophylaxis:  Lovenox    Anticipated Disposition:  SNF/LTC vs HH with PT, OT           ___________________________________________________    Attending Physician:   Tavo Webster MD

## 2019-06-12 NOTE — PROGRESS NOTES
[place] is unable to accept at this time; unable to provide for Pt's needs. Referral was sent to [facility]. Awaiting response. Update: [facility] are unable to accept at this time. Unable to provide for Pt's needs. CM contacted [facility] liaison to inquire about qualifications for [address]. CM was informed that facility would be able to evaluate to see if Pt would qualify for LTAC placement.          Aris Valle, [address] [place] [address]

## 2019-06-12 NOTE — PROGRESS NOTES
6/12/2019   CARE MANAGEMENT NOTE:  (cross coverage) - Pt's clinicals were reviewed by [facility] rep and per their , pt does not meet admission criteria.   Neelam

## 2019-06-12 NOTE — PROGRESS NOTES
Problem: Mobility Impaired (Adult and Pediatric)  Goal: *Acute Goals and Plan of Care (Insert Text)  Description  Physical Therapy Goals  Revised 6/11/2019  1. Patient will move from supine to sit and sit to supine  in bed with modified independence within 7 day(s). 2.  Patient will transfer from bed to chair and chair to bed with supervision/set-up using the least restrictive device within 7 day(s). 3.  Patient will perform sit to stand with supervision/set-up within 7 day(s). 4.  Patient will ambulate with supervision/set-up for 100 feet with the least restrictive device within 7 day(s). 5.  Patient will ascend/descend 2 stairs with  handrail(s) with minimal assistance/contact guard assist within 7 day(s). Physical Therapy Goals  Initiated 5/31/2019  1. Patient will move from supine to sit and sit to supine , scoot up and down and roll side to side in bed with modified independence within 7 day(s). 2.  Patient will transfer from bed to chair and chair to bed with modified independence using the least restrictive device within 7 day(s). 3.  Patient will perform sit to stand with modified independence within 7 day(s). 4.  Patient will ambulate with modified independence for 200 feet with the least restrictive device within 7 day(s). 5.  Patient will ascend/descend 2 stairs with  handrail(s) with modified independence within 7 day(s). Note:   PHYSICAL THERAPY TREATMENT  Patient: Rosy Clayton (79 y.o. male)  Date: 6/12/2019  Diagnosis: Open wound, lower leg [S81.809A] <principal problem not specified>       Precautions: Fall, WBAT(L foot)  Chart, physical therapy assessment, plan of care and goals were reviewed. ASSESSMENT:  Pt comes to stand with CGA. Pt ambulates 20ft with single point cane CGA with surgical shoe on left foot. Pt ambulates with antalgic gait compounded by weakness from old CVA. Pt is at risk for falls and is impulsive. Pt has very poor safety judgement and is not safe to live alone.Pt has refused use of noe walker that he feels wont help his balnce. Pt progress slow. continue goals. Progression toward goals:  ?    Improving appropriately and progressing toward goals  ? Improving slowly and progressing toward goals  ? Not making progress toward goals and plan of care will be adjusted     PLAN:  Patient continues to benefit from skilled intervention to address the above impairments. Continue treatment per established plan of care. Discharge Recommendations:  Kranthi Andrews  Further Equipment Recommendations for Discharge:        SUBJECTIVE:       OBJECTIVE DATA SUMMARY:   Critical Behavior:  Neurologic State: Alert  Orientation Level: Oriented X4  Cognition: Follows commands  Safety/Judgement: Awareness of environment, Fall prevention, Insight into deficits  Functional Mobility Training:  Bed Mobility:                    Transfers:  Sit to Stand: Contact guard assistance  Stand to Sit: Contact guard assistance                             Balance:  Sitting: Intact  Standing: Without support  Standing - Static: Fair  Ambulation/Gait Training:     Assistive Device: Cane, straight;Gait belt  Ambulation - Level of Assistance: Contact guard assistance        Gait Abnormalities: Path deviations;Decreased step clearance        Base of Support: Widened     Speed/Reva: Pace decreased (<100 feet/min)  Step Length: Left shortened;Right shortened              Pain:  Pain Scale 1: Numeric (0 - 10)  Pain Intensity 1: 6  Pain Location 1: Foot  Pain Orientation 1: Left  Pain Description 1: Aching  Pain Intervention(s) 1: Medication (see MAR)  Activity Tolerance:   Pt tolerated treatment fairly well   Please refer to the flowsheet for vital signs taken during this treatment. After treatment:   ?    Patient left in no apparent distress sitting up in chair  ? Patient left in no apparent distress in bed  ? Call bell left within reach  ? Nursing notified  ? Caregiver present  ? Bed alarm activated    COMMUNICATION/COLLABORATION:   The patient?s plan of care was discussed with: Physical Therapist    Aubrie Pizano PTA   Time Calculation: 23 mins

## 2019-06-13 PROCEDURE — 74011250637 HC RX REV CODE- 250/637: Performed by: INTERNAL MEDICINE

## 2019-06-13 PROCEDURE — 74011250636 HC RX REV CODE- 250/636: Performed by: INTERNAL MEDICINE

## 2019-06-13 PROCEDURE — 65270000029 HC RM PRIVATE

## 2019-06-13 PROCEDURE — 97530 THERAPEUTIC ACTIVITIES: CPT

## 2019-06-13 PROCEDURE — 77030020186 HC BOOT HL PROTCT SAGE -B

## 2019-06-13 PROCEDURE — 97116 GAIT TRAINING THERAPY: CPT

## 2019-06-13 RX ADMIN — GABAPENTIN 300 MG: 100 CAPSULE ORAL at 21:59

## 2019-06-13 RX ADMIN — Medication 10 ML: at 06:42

## 2019-06-13 RX ADMIN — OXYCODONE HYDROCHLORIDE 10 MG: 5 TABLET ORAL at 09:59

## 2019-06-13 RX ADMIN — METHADONE HYDROCHLORIDE 130 MG: 10 CONCENTRATE ORAL at 09:59

## 2019-06-13 RX ADMIN — TAMSULOSIN HYDROCHLORIDE 0.4 MG: 0.4 CAPSULE ORAL at 09:59

## 2019-06-13 RX ADMIN — ENOXAPARIN SODIUM 40 MG: 40 INJECTION SUBCUTANEOUS at 09:59

## 2019-06-13 RX ADMIN — SENNOSIDES AND DOCUSATE SODIUM 1 TABLET: 8.6; 5 TABLET ORAL at 10:00

## 2019-06-13 RX ADMIN — LEVOTHYROXINE SODIUM 100 MCG: 100 TABLET ORAL at 06:41

## 2019-06-13 RX ADMIN — LISINOPRIL 40 MG: 20 TABLET ORAL at 09:59

## 2019-06-13 RX ADMIN — ACETAMINOPHEN 650 MG: 325 TABLET ORAL at 03:00

## 2019-06-13 RX ADMIN — GABAPENTIN 300 MG: 100 CAPSULE ORAL at 16:37

## 2019-06-13 RX ADMIN — OXYCODONE HYDROCHLORIDE 10 MG: 5 TABLET ORAL at 03:49

## 2019-06-13 RX ADMIN — ACETAMINOPHEN 650 MG: 325 TABLET ORAL at 20:23

## 2019-06-13 RX ADMIN — AMLODIPINE BESYLATE 10 MG: 5 TABLET ORAL at 09:59

## 2019-06-13 RX ADMIN — GABAPENTIN 300 MG: 100 CAPSULE ORAL at 10:00

## 2019-06-13 RX ADMIN — Medication 10 ML: at 21:59

## 2019-06-13 RX ADMIN — OXYCODONE HYDROCHLORIDE 10 MG: 5 TABLET ORAL at 16:37

## 2019-06-13 RX ADMIN — OXYCODONE HYDROCHLORIDE 10 MG: 5 TABLET ORAL at 22:39

## 2019-06-13 RX ADMIN — ALLOPURINOL 100 MG: 100 TABLET ORAL at 10:00

## 2019-06-13 RX ADMIN — SERTRALINE HYDROCHLORIDE 50 MG: 50 TABLET ORAL at 09:59

## 2019-06-13 RX ADMIN — PANTOPRAZOLE SODIUM 40 MG: 40 TABLET, DELAYED RELEASE ORAL at 06:41

## 2019-06-13 RX ADMIN — AVOBENZONE, HOMOSALATE, OCTISALATE, OCTOCRYLENE, AND OXYBENZONE 1 PACKET: 29.4; 147; 49; 25.4; 58.8 LOTION TOPICAL at 09:59

## 2019-06-13 NOTE — PROGRESS NOTES
Bedside and Verbal shift change report given to Ti Plaza RN (oncoming nurse) by Cesar Lucas RN (offgoing nurse). Report included the following information SBAR, Kardex, MAR and Accordion.

## 2019-06-13 NOTE — PROGRESS NOTES
At 0300, was requesting his pain med but was not able to have his roxicodone. Had Tylenol. Did not want to have his lovenox injection at this time, but stated he would take it with his pain med. At 36, he had his roxicodone but refused to have the lovenox.

## 2019-06-13 NOTE — PROGRESS NOTES
When nurse attempted to change dressing to foot patient refused. Patient said \"after my next pain medication\". Will re attempt.

## 2019-06-13 NOTE — PROGRESS NOTES
Problem: Mobility Impaired (Adult and Pediatric)  Goal: *Acute Goals and Plan of Care (Insert Text)  Description  Physical Therapy Goals  Revised 6/11/2019  1. Patient will move from supine to sit and sit to supine  in bed with modified independence within 7 day(s). 2.  Patient will transfer from bed to chair and chair to bed with supervision/set-up using the least restrictive device within 7 day(s). 3.  Patient will perform sit to stand with supervision/set-up within 7 day(s). 4.  Patient will ambulate with supervision/set-up for 100 feet with the least restrictive device within 7 day(s). 5.  Patient will ascend/descend 2 stairs with  handrail(s) with minimal assistance/contact guard assist within 7 day(s). Physical Therapy Goals  Initiated 5/31/2019  1. Patient will move from supine to sit and sit to supine , scoot up and down and roll side to side in bed with modified independence within 7 day(s). 2.  Patient will transfer from bed to chair and chair to bed with modified independence using the least restrictive device within 7 day(s). 3.  Patient will perform sit to stand with modified independence within 7 day(s). 4.  Patient will ambulate with modified independence for 200 feet with the least restrictive device within 7 day(s). 5.  Patient will ascend/descend 2 stairs with  handrail(s) with modified independence within 7 day(s). Note:   PHYSICAL THERAPY TREATMENT  Patient: Shreya Singletary (74 y.o. male)  Date: 6/13/2019  Diagnosis: Open wound, lower leg [S81.809A] <principal problem not specified>       Precautions: Fall, WBAT(L foot)  Chart, physical therapy assessment, plan of care and goals were reviewed. ASSESSMENT:  Pt comes to sit with supervision. Pt to stand with bed elevated CGA. Pt ambulated 20ft in room with a cane CGA. Pt  with increased lateral sway and decreased weight on left foot. Pt with left side weakness from old CVA. Pt functions but has poor safety judgement and gait stability decreases with increased distance. Pt is at increased risk for falls. Continue goals. Progression toward goals:  ?    Improving appropriately and progressing toward goals  ? Improving slowly and progressing toward goals  ? Not making progress toward goals and plan of care will be adjusted     PLAN:  Patient continues to benefit from skilled intervention to address the above impairments. Continue treatment per established plan of care. Discharge Recommendations:  Kranthi Andrews  Further Equipment Recommendations for Discharge:  cane     SUBJECTIVE:       OBJECTIVE DATA SUMMARY:   Critical Behavior:  Neurologic State: Alert  Orientation Level: Oriented X4  Cognition: Follows commands, Impulsive, Poor safety awareness  Safety/Judgement: Awareness of environment, Fall prevention, Insight into deficits  Functional Mobility Training:  Bed Mobility:     Supine to Sit: Supervision  Sit to Supine: Supervision           Transfers:  Sit to Stand: Contact guard assistance  Stand to Sit: Contact guard assistance                             Balance:  Sitting: Intact  Standing: Without support  Ambulation/Gait Training:  Distance (ft): 20 Feet (ft)  Assistive Device: Gait belt;Cane, straight  Ambulation - Level of Assistance: Contact guard assistance        Gait Abnormalities: Decreased step clearance;Trunk sway increased        Base of Support: Narrowed     Speed/Reva: Pace decreased (<100 feet/min)  Step Length: Left shortened;Right shortened                Pain:  Pain Scale 1: Adult Nonverbal Pain Scale  Pain Intensity 1: 0  Pain Location 1: Foot  Pain Orientation 1: Left  Pain Description 1: Aching;Tightness  Pain Intervention(s) 1: Medication (see MAR)  Activity Tolerance:   Pt tolerated treatment fairly well. Please refer to the flowsheet for vital signs taken during this treatment. After treatment:   ?    Patient left in no apparent distress sitting up in chair  ?     Patient left in no apparent distress in bed  ? Call bell left within reach  ? Nursing notified  ? Caregiver present  ?     Bed alarm activated    COMMUNICATION/COLLABORATION:   The patient?s plan of care was discussed with: Physical Therapist    Nahum Balderrama PTA   Time Calculation: 23 mins

## 2019-06-13 NOTE — PROGRESS NOTES
Continues to refuse to have the dressing change to left foot done. Now states that he will have it done \"in the morning\".

## 2019-06-13 NOTE — PROGRESS NOTES
made a follow up visit for patient, Lynn Rodgers, on the [facility] unit. Lynn Rodgers was awake, alert, and sitting up in bed when the  came to visit. There was no family present during this time.  introduced herself and extended support through active listening and pastoral presence. Lynn Rodgers briefly discussed his health concerns, his recovery process, and his expectation to be discharged home tomorrow. He is looking forward to going home and being in his own space.  celebrated this process with him.  inquired how she could support Lynn Rodgers during this time. Lynn Rodgers expressed no other needs and thanked that  for her visit.  will follow up as able and/or needed. Ry Gallardo. Darwin Rose.      Paging Service: [phone])

## 2019-06-13 NOTE — WOUND CARE
Wound care follow up to assess left foot wounds- pain med per staff nurse prior to care given.     Assessment  Left foot dressings removed for assessment, cleansed with 1/4 strength dakins-   Left lateral heel - full thickness, yellow slough decreased, drainage decreased- pale pink granulation - ludwig wound intact, no redness, skin is dry and scaling  Left dorsal foot -full thickness wound, red base, less yellow granulation-less drainage. No odor.     Treatment  Wounds cleansed - saline moistened hydrofera blue to wounds, covered with moist 4x4, dry 4x4 and kerlix- Tubigrip applied, prevalon boot and elevated     Plan of care discussed with Patient,   Orders changed- modified.   Patient to dc with HH and follow up in [facility] wound care clinic- verbalized understanding- supplies at bedside for DC   Will follow  [address]

## 2019-06-13 NOTE — PROGRESS NOTES
Gigi Herrelsen sulema [place] 79  [address] [facility], [address] [place] [address]  [phone]      Medical Progress Note      NAME:         Rolando Atkinson   :        1965  MRM:        [id number]    Date:          2019      Subjective: Patient has been seen and examined as a follow up for multiple medical issues. Chart, labs, diagnostics reviewed. He remains weak but ambulating well. No new symptoms. No SNF accepting. Afebrile    Objective:    Vital Signs:    Visit Vitals  /66 (BP 1 Location: Right arm, BP Patient Position: At rest;Supine)   Pulse 92   Temp 98.5 °F (36.9 °C)   Resp 17   Ht 6' 1\" (1.854 m)   Wt 108.2 kg (238 lb 9.6 oz)   SpO2 93%   BMI 31.48 kg/m²        No intake or output data in the 24 hours ending 19 1416     Physical Examination:    General:   Weak looking and not in any acute distress. Eyes:   pink conjunctivae, PERRLA with no discharge. Pulm: clear breath sounds without crackles or wheezes  Card:  no JVD or murmurs, has regular and normal S1, S2 without thrills, bruits  Abd:  Soft, non-tender, non-distended, normoactive bowel sounds   Musc:  No cyanosis, clubbing, atrophy or deformities. Skin:  No rashes, bruising or ulcers. Dressed L foot  Neuro: Awake and alert. L hemiparesis.  Follows commands appropriately  Psych:  Has a good insight and is oriented x 3    Current Facility-Administered Medications   Medication Dose Route Frequency    psyllium husk-aspartame (METAMUCIL FIBER) packet 1 Packet  1 Packet Oral BID    gabapentin (NEURONTIN) capsule 300 mg  300 mg Oral TID    polyethylene glycol (MIRALAX) packet 17 g  17 g Oral DAILY    acetaminophen (TYLENOL) tablet 650 mg  650 mg Oral Q4H PRN    melatonin tablet 3 mg  3 mg Oral QHS PRN    methadone (DOLOPHINE) 10 mg/mL concentrated solution 130 mg  130 mg Oral DAILY    colchicine tablet 0.6 mg  0.6 mg Oral DAILY PRN    lisinopril (PRINIVIL, ZESTRIL) tablet 40 mg  40 mg Oral DAILY    amLODIPine (NORVASC) tablet 10 mg  10 mg Oral DAILY    senna-docusate (PERICOLACE) 8.6-50 mg per tablet 1 Tab  1 Tab Oral DAILY    sertraline (ZOLOFT) tablet 50 mg  50 mg Oral DAILY    allopurinol (ZYLOPRIM) tablet 100 mg  100 mg Oral DAILY    tamsulosin (FLOMAX) capsule 0.4 mg  0.4 mg Oral DAILY    nicotine (NICODERM CQ) 21 mg/24 hr patch 1 Patch  1 Patch TransDERmal DAILY PRN    OLANZapine (ZyPREXA) tablet 5 mg  5 mg Oral Q6H PRN    sodium chloride (NS) flush 5-40 mL  5-40 mL IntraVENous Q8H    sodium chloride (NS) flush 5-40 mL  5-40 mL IntraVENous PRN    diphenhydrAMINE (BENADRYL) capsule 25 mg  25 mg Oral Q4H PRN    ondansetron (ZOFRAN) injection 4 mg  4 mg IntraVENous Q4H PRN    enoxaparin (LOVENOX) injection 40 mg  40 mg SubCUTAneous Q24H    pantoprazole (PROTONIX) tablet 40 mg  40 mg Oral ACB    alum-mag hydroxide-simeth (MYLANTA) oral suspension 30 mL  30 mL Oral Q4H PRN    levothyroxine (SYNTHROID) tablet 100 mcg  100 mcg Oral ACB    oxyCODONE IR (ROXICODONE) tablet 10 mg  10 mg Oral Q6H PRN        Laboratory data and review:    No results for input(s): WBC, HGB, HCT, PLT, HGBEXT, HCTEXT, PLTEXT, HGBEXT, HCTEXT, PLTEXT in the last 72 hours. No results for input(s): NA, K, CL, CO2, GLU, BUN, CREA, CA, MG, PHOS, ALB, TBIL, SGOT, ALT, INR in the last 72 hours. No lab exists for component: INREXT, INREXT  No components found for: [place]    Assessment and Plan:    45 yo hx of HTN, CVA w/ L hemiparesis, bladder CA, depression, chronic pain, presented w/ L foot wound infection     Acute on chronic L foot wound infection: Now much improved. S/p I&D by podiatry on 05/25. Wound cx with stenotrophomonas, proteus, klebsiella. Completed a 10 day course of IV CTX/tigecycline, ended on 06/10. The patient will f/u with [facility] wound center and with Dr. Yovani Perdomo (Vascular) for saphenous vein ablation. Will cont wound care. No SNF would accept him.  SHERRI working on alternative dispo but likely home with home health services.       HTN: BP stable. Cont norvasc, lisinopril     Hypothyroid: TSH elevated but patient was non-compliance. Will cont synthroid. Repeat TFTs in 4 weeks     Chronic pain/depression: cont methadone, SSRI     Hx of bladder CA: s/p surgery in 04/2018 at [place].   Will need to f/u with his Urologist.  Cont flomax     Hx of hemorrhagic CVA w/ L sided hemiparesis: cont PT/OT and ambulate as tolerated      Total time spent for the patient's care: [address] [facility] discussed with: Patient, Care Manager and Nursing Staff    Discussed:  Care Plan and D/C Planning    Prophylaxis:  Lovenox    Anticipated Disposition:  with PT, OT           ___________________________________________________    Attending Physician:   Janett Sanchez MD

## 2019-06-13 NOTE — PROGRESS NOTES
CM met with Pt to inquire about physical address to arrange home health. Pt informed CM that his friend would be contacting him with an address by 5pm today. CM will follow-up with Pt for address. Update: CM was informed per Pt that he would be moving into a new apartment and would not have an exact address until tomorrow. Pt was able to provide CM with the name of the apartment and zip code: [address] [place] [address].        [facility], [address] [place] [address]

## 2019-06-13 NOTE — PROGRESS NOTES
Bedside and Verbal shift change report given to Chioma Murphy (oncoming nurse) by Cain Hooker  (offgoing nurse). Report included the following information SBAR, Kardex, Intake/Output, MAR and Recent Results.

## 2019-06-14 PROCEDURE — 74011250637 HC RX REV CODE- 250/637: Performed by: INTERNAL MEDICINE

## 2019-06-14 PROCEDURE — 97116 GAIT TRAINING THERAPY: CPT

## 2019-06-14 PROCEDURE — 65270000029 HC RM PRIVATE

## 2019-06-14 PROCEDURE — 74011250636 HC RX REV CODE- 250/636: Performed by: INTERNAL MEDICINE

## 2019-06-14 RX ORDER — GABAPENTIN 300 MG/1
300 CAPSULE ORAL 3 TIMES DAILY
Qty: 90 CAP | Refills: 0 | Status: SHIPPED | OUTPATIENT
Start: 2019-06-14 | End: 2019-07-14

## 2019-06-14 RX ORDER — POLYETHYLENE GLYCOL 3350 17 G/17G
17 POWDER, FOR SOLUTION ORAL DAILY
Qty: 30 PACKET | Refills: 0 | Status: SHIPPED | OUTPATIENT
Start: 2019-06-14 | End: 2019-07-14

## 2019-06-14 RX ADMIN — ACETAMINOPHEN 650 MG: 325 TABLET ORAL at 12:55

## 2019-06-14 RX ADMIN — ACETAMINOPHEN 650 MG: 325 TABLET ORAL at 03:30

## 2019-06-14 RX ADMIN — LISINOPRIL 40 MG: 20 TABLET ORAL at 08:10

## 2019-06-14 RX ADMIN — GABAPENTIN 300 MG: 100 CAPSULE ORAL at 22:45

## 2019-06-14 RX ADMIN — ENOXAPARIN SODIUM 40 MG: 40 INJECTION SUBCUTANEOUS at 08:12

## 2019-06-14 RX ADMIN — ACETAMINOPHEN 650 MG: 325 TABLET ORAL at 17:35

## 2019-06-14 RX ADMIN — OXYCODONE HYDROCHLORIDE 10 MG: 5 TABLET ORAL at 10:17

## 2019-06-14 RX ADMIN — PANTOPRAZOLE SODIUM 40 MG: 40 TABLET, DELAYED RELEASE ORAL at 06:55

## 2019-06-14 RX ADMIN — AMLODIPINE BESYLATE 10 MG: 5 TABLET ORAL at 08:12

## 2019-06-14 RX ADMIN — TAMSULOSIN HYDROCHLORIDE 0.4 MG: 0.4 CAPSULE ORAL at 08:10

## 2019-06-14 RX ADMIN — OXYCODONE HYDROCHLORIDE 10 MG: 5 TABLET ORAL at 22:45

## 2019-06-14 RX ADMIN — OXYCODONE HYDROCHLORIDE 10 MG: 5 TABLET ORAL at 16:05

## 2019-06-14 RX ADMIN — ACETAMINOPHEN 650 MG: 325 TABLET ORAL at 08:11

## 2019-06-14 RX ADMIN — ALLOPURINOL 100 MG: 100 TABLET ORAL at 08:11

## 2019-06-14 RX ADMIN — SENNOSIDES AND DOCUSATE SODIUM 1 TABLET: 8.6; 5 TABLET ORAL at 08:10

## 2019-06-14 RX ADMIN — LEVOTHYROXINE SODIUM 100 MCG: 100 TABLET ORAL at 06:56

## 2019-06-14 RX ADMIN — METHADONE HYDROCHLORIDE 130 MG: 10 CONCENTRATE ORAL at 08:13

## 2019-06-14 RX ADMIN — GABAPENTIN 300 MG: 100 CAPSULE ORAL at 08:11

## 2019-06-14 RX ADMIN — OXYCODONE HYDROCHLORIDE 10 MG: 5 TABLET ORAL at 04:35

## 2019-06-14 RX ADMIN — SERTRALINE HYDROCHLORIDE 50 MG: 50 TABLET ORAL at 08:11

## 2019-06-14 RX ADMIN — GABAPENTIN 300 MG: 100 CAPSULE ORAL at 16:05

## 2019-06-14 NOTE — PROGRESS NOTES
Gigi Herrelsen [facility] 79  [address]  [phone]      Medical Progress Note      NAME:         Jenni Sharma   :        1965  MRM:        [id number]    Date:          2019      Subjective: Patient has been seen and examined as a follow up for multiple medical issues. Chart, labs, diagnostics reviewed. He remains stable. No new symptoms. No fever or chills. No SNF accepting. Objective:    Vital Signs:    Visit Vitals  /84 (BP 1 Location: Right arm, BP Patient Position: At rest)   Pulse 68   Temp 96.5 °F (35.8 °C)   Resp 20   Ht 6' 1\" (1.854 m)   Wt 108.2 kg (238 lb 9.6 oz)   SpO2 96%   BMI 31.48 kg/m²        No intake or output data in the 24 hours ending 19 0805     Physical Examination:    General:   Weak looking and not in any acute distress. Eyes:   pink conjunctivae, PERRLA with no discharge. Pulm: clear breath sounds without crackles or wheezes  Card:  has regular and normal S1, S2 without thrills, bruits  Abd:  Soft, non-tender, non-distended, normoactive bowel sounds   Musc:  No cyanosis, clubbing, atrophy or deformities. Skin:  No rashes, bruising or ulcers. Dressed L foot  Neuro: Awake and alert. L hemiparesis.    Psych:  Has a good insight and is oriented x 3    Current Facility-Administered Medications   Medication Dose Route Frequency    psyllium husk-aspartame (METAMUCIL FIBER) packet 1 Packet  1 Packet Oral BID    gabapentin (NEURONTIN) capsule 300 mg  300 mg Oral TID    polyethylene glycol (MIRALAX) packet 17 g  17 g Oral DAILY    acetaminophen (TYLENOL) tablet 650 mg  650 mg Oral Q4H PRN    melatonin tablet 3 mg  3 mg Oral QHS PRN    methadone (DOLOPHINE) 10 mg/mL concentrated solution 130 mg  130 mg Oral DAILY    colchicine tablet 0.6 mg  0.6 mg Oral DAILY PRN    lisinopril (PRINIVIL, ZESTRIL) tablet 40 mg  40 mg Oral DAILY    amLODIPine (NORVASC) tablet 10 mg  10 mg Oral DAILY    senna-docusate (PERICOLACE) 8.6-50 mg per tablet 1 Tab  1 Tab Oral DAILY    sertraline (ZOLOFT) tablet 50 mg  50 mg Oral DAILY    allopurinol (ZYLOPRIM) tablet 100 mg  100 mg Oral DAILY    tamsulosin (FLOMAX) capsule 0.4 mg  0.4 mg Oral DAILY    nicotine (NICODERM CQ) 21 mg/24 hr patch 1 Patch  1 Patch TransDERmal DAILY PRN    OLANZapine (ZyPREXA) tablet 5 mg  5 mg Oral Q6H PRN    sodium chloride (NS) flush 5-40 mL  5-40 mL IntraVENous Q8H    sodium chloride (NS) flush 5-40 mL  5-40 mL IntraVENous PRN    diphenhydrAMINE (BENADRYL) capsule 25 mg  25 mg Oral Q4H PRN    ondansetron (ZOFRAN) injection 4 mg  4 mg IntraVENous Q4H PRN    enoxaparin (LOVENOX) injection 40 mg  40 mg SubCUTAneous Q24H    pantoprazole (PROTONIX) tablet 40 mg  40 mg Oral ACB    alum-mag hydroxide-simeth (MYLANTA) oral suspension 30 mL  30 mL Oral Q4H PRN    levothyroxine (SYNTHROID) tablet 100 mcg  100 mcg Oral ACB    oxyCODONE IR (ROXICODONE) tablet 10 mg  10 mg Oral Q6H PRN        Laboratory data and review:    No results for input(s): WBC, HGB, HCT, PLT, HGBEXT, HCTEXT, PLTEXT, HGBEXT, HCTEXT, PLTEXT in the last 72 hours. No results for input(s): NA, K, CL, CO2, GLU, BUN, CREA, CA, MG, PHOS, ALB, TBIL, SGOT, ALT, INR in the last 72 hours. No lab exists for component: INREXT, INREXT  No components found for: [place]    Assessment and Plan:    47 yo hx of HTN, CVA w/ L hemiparesis, bladder CA, depression, chronic pain, presented w/ L foot wound infection     Acute on chronic L foot wound infection: Now much improved. S/p I&D by podiatry on 05/25. Wound cx with stenotrophomonas, proteus, klebsiella. Completed a 10 day course of IV ceftriaxone and tigecycline, ended on 06/10. The patient will f/u with [facility] wound center and with Dr. Slime Vuong (Vascular) for saphenous vein ablation. Will cont wound care. No SNF would accept him.  CM working on alternative dispo but likely home with home health services. Awaiting final arrangements       HTN: BP stable. Cont norvasc, lisinopril     Hypothyroid: TSH elevated but patient was non-compliance. Will cont synthroid. Repeat TFTs in 4 weeks     Chronic pain  / depression: cont methadone, SSRI     Hx of bladder CA: s/p surgery in 04/2018 at [place].   Will need to f/u with his Urologist.  Cont flomax     Hx of hemorrhagic CVA w/ L sided hemiparesis: cont PT/OT and ambulate as tolerated      Total time spent for the patient's care: [address] [facility] discussed with: Patient, Care Manager and Nursing Staff    Discussed:  Care Plan and D/C Planning    Prophylaxis:  Lovenox    Anticipated Disposition:  with PT, OT           ___________________________________________________    Attending Physician:   Beka Durham MD

## 2019-06-14 NOTE — PROGRESS NOTES
Bedside and Verbal shift change report given to Emery Orozco RN (oncoming nurse) by Kyle Beasley RN (offgoing nurse). Report included the following information SBAR, Kardex, Intake/Output, MAR and Recent Results.

## 2019-06-14 NOTE — PROGRESS NOTES
Bedside and Verbal shift change report given to Maribell Soto RN (oncoming nurse) by Luis May RN (offgoing nurse). Report included the following information SBAR, Kardex, MAR and Accordion.

## 2019-06-14 NOTE — PROGRESS NOTES
Attempted to make follow up appointment with Dr Jodi Holman but office stated bayron the patient missed 3 appointments in a row they dismissed the patient from practice

## 2019-06-14 NOTE — PROGRESS NOTES
0740 - Bedside and Verbal shift change report given to clive dumont (oncoming nurse) by Neal Delatorre (offgoing nurse). Report included the following information SBAR, Kardex, Intake/Output, MAR and Recent Results. 1510 - Bedside and Verbal shift change report given to ha (oncoming nurse) by Nithya Powell (offgoing nurse). Report included the following information SBAR, Kardex, Intake/Output, MAR and Recent Results.

## 2019-06-14 NOTE — PROGRESS NOTES
CM met with Pt to obtain physical address for [facility]. Pt stated that he is still waiting on phone call from friend. Update: Pt informed CM that his apartment will not be ready until Monday, but was able to provide CM with physical address:    [address],  [address]    CM sent several referrals to [facility] agencies. [facility] [place], [facility], [facility] to [facility] at this time. [facility] and was not compliant with services. CM will follow up with Pt to obtain information.        [address], [place], [address] [place] [address]

## 2019-06-14 NOTE — PROGRESS NOTES
Problem: Mobility Impaired (Adult and Pediatric)  Goal: *Acute Goals and Plan of Care (Insert Text)  Description  Physical Therapy Goals  Revised 6/11/2019  1. Patient will move from supine to sit and sit to supine  in bed with modified independence within 7 day(s). 2.  Patient will transfer from bed to chair and chair to bed with supervision/set-up using the least restrictive device within 7 day(s). 3.  Patient will perform sit to stand with supervision/set-up within 7 day(s). 4.  Patient will ambulate with supervision/set-up for 100 feet with the least restrictive device within 7 day(s). 5.  Patient will ascend/descend 2 stairs with  handrail(s) with minimal assistance/contact guard assist within 7 day(s). Physical Therapy Goals  Initiated 5/31/2019  1. Patient will move from supine to sit and sit to supine , scoot up and down and roll side to side in bed with modified independence within 7 day(s). 2.  Patient will transfer from bed to chair and chair to bed with modified independence using the least restrictive device within 7 day(s). 3.  Patient will perform sit to stand with modified independence within 7 day(s). 4.  Patient will ambulate with modified independence for 200 feet with the least restrictive device within 7 day(s). 5.  Patient will ascend/descend 2 stairs with  handrail(s) with modified independence within 7 day(s). Outcome: Progressing Towards Goal   PHYSICAL THERAPY TREATMENT  Patient: Lynn Trinidad (76 y.o. male)  Date: 6/14/2019  Diagnosis: Open wound, lower leg [S81.809A] <principal problem not specified>       Precautions: Fall, WBAT(L foot)  Chart, physical therapy assessment, plan of care and goals were reviewed. ASSESSMENT:  Patient received sitting up on side of bed wearing his bunny boot. He expresses frustration at trying to arrange his discharge. Performed transfer training sit to stand from the bed at elevated height with supervision.   Gait training to/from bathroom with the cane and supervision. Patient able to stand and brush his teeth using right hand to open new tooth paste all with supervision. Patient then requesting privacy in the bathroom to stand and use the toilet. Progression toward goals:  ?    Improving appropriately and progressing toward goals  ? Improving slowly and progressing toward goals  ? Not making progress toward goals and plan of care will be adjusted     PLAN:  Patient continues to benefit from skilled intervention to address the above impairments. Continue treatment per established plan of care. Discharge Recommendations:  Home Health  Further Equipment Recommendations for Discharge:  none      SUBJECTIVE:   Patient stated ? I'm frustrated. ?    OBJECTIVE DATA SUMMARY:   Critical Behavior:  Neurologic State: Alert  Orientation Level: Oriented X4  Cognition: Follows commands  Safety/Judgement: Awareness of environment, Fall prevention, Insight into deficits  Functional Mobility Training:  Bed Mobility:                    Transfers:  Sit to Stand: Supervision  Stand to Sit: Supervision                             Balance:     Ambulation/Gait Training:  Distance (ft): 15 Feet (ft)(twice)  Assistive Device: Cane, straight(pt refused gait belt d/t neuralgia pain on left side)  Ambulation - Level of Assistance: Supervision                 Base of Support: Center of gravity altered; Widened;Shift to right                             Stairs:                Pain:  Pain Scale 1: Numeric (0 - 10)  Pain Intensity 1: 9  Pain Location 1: Foot  Pain Orientation 1: Left  Pain Description 1: Aching;Constant  Pain Intervention(s) 1: Medication (see MAR)  Activity Tolerance:   Limited by left foot pain  Please refer to the flowsheet for vital signs taken during this treatment. After treatment:   ?    Patient left in no apparent distress sitting up on side of bed  ? Patient left in no apparent distress in bed  ? Call bell left within reach  ? Nursing notified  ? Caregiver present  ?     Bed alarm activated    COMMUNICATION/COLLABORATION:   The patient?s plan of care was discussed with: Registered Nurse    Luc Barker, PT   Time Calculation: 10 mins

## 2019-06-15 PROCEDURE — 74011250637 HC RX REV CODE- 250/637: Performed by: INTERNAL MEDICINE

## 2019-06-15 PROCEDURE — 74011250636 HC RX REV CODE- 250/636: Performed by: INTERNAL MEDICINE

## 2019-06-15 PROCEDURE — 65270000029 HC RM PRIVATE

## 2019-06-15 RX ORDER — OXYCODONE HYDROCHLORIDE 5 MG/1
5 TABLET ORAL
Status: DISCONTINUED | OUTPATIENT
Start: 2019-06-15 | End: 2019-06-19 | Stop reason: HOSPADM

## 2019-06-15 RX ORDER — IBUPROFEN 400 MG/1
400 TABLET ORAL
Status: DISCONTINUED | OUTPATIENT
Start: 2019-06-15 | End: 2019-06-19 | Stop reason: HOSPADM

## 2019-06-15 RX ADMIN — TAMSULOSIN HYDROCHLORIDE 0.4 MG: 0.4 CAPSULE ORAL at 09:18

## 2019-06-15 RX ADMIN — Medication 20 ML: at 14:00

## 2019-06-15 RX ADMIN — ALLOPURINOL 100 MG: 100 TABLET ORAL at 09:18

## 2019-06-15 RX ADMIN — PANTOPRAZOLE SODIUM 40 MG: 40 TABLET, DELAYED RELEASE ORAL at 06:22

## 2019-06-15 RX ADMIN — OXYCODONE HYDROCHLORIDE 5 MG: 5 TABLET ORAL at 23:39

## 2019-06-15 RX ADMIN — AVOBENZONE, HOMOSALATE, OCTISALATE, OCTOCRYLENE, AND OXYBENZONE 1 PACKET: 29.4; 147; 49; 25.4; 58.8 LOTION TOPICAL at 09:18

## 2019-06-15 RX ADMIN — OXYCODONE HYDROCHLORIDE 10 MG: 5 TABLET ORAL at 04:51

## 2019-06-15 RX ADMIN — SERTRALINE HYDROCHLORIDE 50 MG: 50 TABLET ORAL at 09:18

## 2019-06-15 RX ADMIN — Medication 10 ML: at 22:01

## 2019-06-15 RX ADMIN — ACETAMINOPHEN 650 MG: 325 TABLET ORAL at 01:57

## 2019-06-15 RX ADMIN — GABAPENTIN 300 MG: 100 CAPSULE ORAL at 22:01

## 2019-06-15 RX ADMIN — ENOXAPARIN SODIUM 40 MG: 40 INJECTION SUBCUTANEOUS at 09:17

## 2019-06-15 RX ADMIN — AMLODIPINE BESYLATE 10 MG: 5 TABLET ORAL at 09:17

## 2019-06-15 RX ADMIN — LEVOTHYROXINE SODIUM 100 MCG: 100 TABLET ORAL at 06:22

## 2019-06-15 RX ADMIN — POLYETHYLENE GLYCOL 3350 17 G: 17 POWDER, FOR SOLUTION ORAL at 09:18

## 2019-06-15 RX ADMIN — GABAPENTIN 300 MG: 100 CAPSULE ORAL at 09:18

## 2019-06-15 RX ADMIN — SENNOSIDES AND DOCUSATE SODIUM 1 TABLET: 8.6; 5 TABLET ORAL at 09:18

## 2019-06-15 RX ADMIN — OXYCODONE HYDROCHLORIDE 5 MG: 5 TABLET ORAL at 15:29

## 2019-06-15 RX ADMIN — GABAPENTIN 300 MG: 100 CAPSULE ORAL at 15:29

## 2019-06-15 RX ADMIN — METHADONE HYDROCHLORIDE 130 MG: 10 CONCENTRATE ORAL at 09:18

## 2019-06-15 RX ADMIN — LISINOPRIL 40 MG: 20 TABLET ORAL at 09:17

## 2019-06-15 NOTE — PROGRESS NOTES
Bedside and Verbal shift change report given to Elan Anthony  (oncoming nurse) by Rohit Barcenas  (offgoing nurse). Report included the following information SBAR and Kardex.

## 2019-06-15 NOTE — PROGRESS NOTES
[facility]    Medical Progress Note      NAME: Alexander Kulkarni   :  1965  MRM:  [id number]    Date/Time: 6/15/2019  10:54 AM          Assessment and Plan:     Acute on chronic L foot wound infection - POA, now improved post I&D by podiatry on .  Wound cx polymicrobial, with stenotrophomonas, proteus, klebsiella. Completed a 10 day course of IV ceftriaxone and tigecycline, ended on 06/10.  Follow up wound care and Dr. Joe Rodriguez (Vascular) for saphenous vein ablation. Physical debility / Hx of hemorrhagic CVA w/ L sided hemiparesis -  Due to prior psychological issues, chronic narcotics and homelessness, no SNF will accept him. CM working on home health services, but again no one will accept. Awaiting final arrangements. Continue PT/OT and ambulate as tolerated. His discharge has been delayed for 5 days so far due to this disposition problem.     HTN (hypertension) - POA, stable on norvasc, lisinopril     Hypothyroid - POA, TSH elevated due to non-compliance. Resumed synthroid.  Repeat TFTs in 4 weeks     Chronic pain / Chronic narcotic use / Anxiety and depression - continue methadone, gabapentin, miralax, sertraline, olanzepine, melatonin, prn oxycodone, but reduce to 5mg, add prn motrin     Hx of bladder CA - s/p surgery in 2018 at [facility] need to f/u with his Urologist. Varghese Holder flomax    Hx gout - Allopurinol and Colchisine       Subjective:     Chief Complaint: Pain is minimal, but he takes the prn 10mg oxycodone on 6hr schedule    ROS:  (bold if positive, if negative)    Not Tolerating PT  Tolerating Diet        Objective:     Last 24hrs VS reviewed since prior progress note.  Most recent are:    Visit Vitals  /76 (BP 1 Location: Right arm)   Pulse (!) 57   Temp 98 °F (36.7 °C)   Resp 18   Ht 6' 1\" (1.854 m)   Wt 108.2 kg (238 lb 9.6 oz)   SpO2 94%   BMI 31.48 kg/m²     SpO2 Readings from Last 6 Encounters:   06/15/19 94%   19 95%   19 99%   05/10/19 92% 10/12/18 96%   09/28/18 95%            Intake/Output Summary (Last 24 hours) at 6/15/2019 1054  Last data filed at 6/15/2019 1000  Gross per 24 hour   Intake 480 ml   Output 400 ml   Net 80 ml        Physical Exam:    Gen:  Obese, in no acute distress  HEENT:  Pink conjunctivae, PERRL, hearing intact to voice, moist mucous membranes  Neck:  Supple, without masses, thyroid non-tender  Resp:  No accessory muscle use, clear breath sounds without wheezes rales or rhonchi  Card:  No murmurs, normal S1, S2 without thrills, bruits or peripheral edema  Abd:  Soft, non-tender, non-distended, normoactive bowel sounds are present, no mass  Lymph:  No cervical or inguinal adenopathy  Musc:  No cyanosis or clubbing  Skin:  No rashes or ulcers, skin turgor is good  Neuro:  Cranial nerves are grossly intact, General motor weakness, follows commands   Psych:  Good insight, oriented to person, place and time, alert    Telemetry reviewed:   normal sinus rhythm  __________________________________________________________________  Medications Reviewed: (see below)  Medications:     Current Facility-Administered Medications   Medication Dose Route Frequency    psyllium husk-aspartame (METAMUCIL FIBER) packet 1 Packet  1 Packet Oral BID    gabapentin (NEURONTIN) capsule 300 mg  300 mg Oral TID    polyethylene glycol (MIRALAX) packet 17 g  17 g Oral DAILY    acetaminophen (TYLENOL) tablet 650 mg  650 mg Oral Q4H PRN    melatonin tablet 3 mg  3 mg Oral QHS PRN    methadone (DOLOPHINE) 10 mg/mL concentrated solution 130 mg  130 mg Oral DAILY    colchicine tablet 0.6 mg  0.6 mg Oral DAILY PRN    lisinopril (PRINIVIL, ZESTRIL) tablet 40 mg  40 mg Oral DAILY    amLODIPine (NORVASC) tablet 10 mg  10 mg Oral DAILY    senna-docusate (PERICOLACE) 8.6-50 mg per tablet 1 Tab  1 Tab Oral DAILY    sertraline (ZOLOFT) tablet 50 mg  50 mg Oral DAILY    allopurinol (ZYLOPRIM) tablet 100 mg  100 mg Oral DAILY    tamsulosin (FLOMAX) capsule 0.4 mg  0.4 mg Oral DAILY    nicotine (NICODERM CQ) 21 mg/24 hr patch 1 Patch  1 Patch TransDERmal DAILY PRN    OLANZapine (ZyPREXA) tablet 5 mg  5 mg Oral Q6H PRN    sodium chloride (NS) flush 5-40 mL  5-40 mL IntraVENous Q8H    sodium chloride (NS) flush 5-40 mL  5-40 mL IntraVENous PRN    diphenhydrAMINE (BENADRYL) capsule 25 mg  25 mg Oral Q4H PRN    ondansetron (ZOFRAN) injection 4 mg  4 mg IntraVENous Q4H PRN    enoxaparin (LOVENOX) injection 40 mg  40 mg SubCUTAneous Q24H    pantoprazole (PROTONIX) tablet 40 mg  40 mg Oral ACB    alum-mag hydroxide-simeth (MYLANTA) oral suspension 30 mL  30 mL Oral Q4H PRN    levothyroxine (SYNTHROID) tablet 100 mcg  100 mcg Oral ACB    oxyCODONE IR (ROXICODONE) tablet 10 mg  10 mg Oral Q6H PRN        Lab Data Reviewed: (see below)  Lab Review:     No results for input(s): WBC, HGB, HCT, PLT, HGBEXT, HCTEXT, PLTEXT in the last 72 hours. No results for input(s): NA, K, CL, CO2, GLU, BUN, CREA, CA, MG, PHOS, ALB, TBIL, TBILI, SGOT, ALT, INR in the last 72 hours. No lab exists for component: INREXT  Lab Results   Component Value Date/Time    Glucose (POC) 150 (H) 03/19/2018 11:45 AM    Glucose (POC) 123 (H) 03/18/2018 08:55 PM    Glucose (POC) 96 03/16/2018 04:58 PM    Glucose (POC) 116 (H) 12/12/2012 12:42 PM    Glucose (POC) 99 12/11/2012 11:27 PM     No results for input(s): PH, PCO2, PO2, HCO3, FIO2 in the last 72 hours. No results for input(s): INR in the last 72 hours.     No lab exists for component: INREXT  All Micro Results     Procedure Component Value Units Date/Time    CULTURE, BLOOD, PAIRED [[id number]] Collected:  05/30/19 0000    Order Status:  Completed Specimen:  Blood Updated:  06/04/19 0508     Special Requests: NO SPECIAL REQUESTS        Culture result: NO GROWTH 5 DAYS       CULTURE, WOUND [place]erson Hedges STAIN [[id number]]  (Abnormal)  (Susceptibility) Collected:  05/30/19 1500    Order Status:  Completed Specimen:  Foot Updated:  06/03/19 6023 Special Requests: NO SPECIAL REQUESTS        GRAM STAIN NO WBC'S SEEN         3+ GRAM NEGATIVE RODS               2+ GRAM POSITIVE COCCOBACILLI           Culture result: HEAVY DIPHTHEROIDS               LIGHT STENOTROPHOMONAS (Lenn Mian.) MALTOPHILIA CLSI GUIDELINES DO NOT RECOMMEND REPORTING SUSCEPTIBILITIES FOR S. MALTOPHILIA. TRIMETHOPRIM/SULFAMETHOXAZOLE IS THE DRUG OF CHOICE. FEW PROTEUS MIRABILIS         FEW KLEBSIELLA PNEUMONIAE       CULTURE, [employer] STAIN [[id number]]  (Abnormal)  (Susceptibility) Collected:  05/30/19 0141    Order Status:  Completed Specimen:  Wound Drainage Updated:  06/02/19 0920     Special Requests: NO SPECIAL REQUESTS        GRAM STAIN NO WBC'S SEEN               OCCASIONAL APPARENT GRAM NEGATIVE RODS           Culture result: LIGHT PROTEUS MIRABILIS               LIGHT STENOTROPHOMONAS ([place] MALTOPHILIA CLSI GUIDELINES DO NOT RECOMMEND REPORTING SUSCEPTIBILITIES FOR S. MALTOPHILIA. TRIMETHOPRIM/SULFAMETHOXAZOLE IS THE DRUG OF CHOICE. HEAVY MIXED SKIN YULIA ISOLATED                Other pertinent lab: none    Total time spent with patient: 30 Minutes I reviewed chart, notes, data and current medications in the medical record. I have examined and treated the patient at bedside during this period.                  Care Plan discussed with: Patient, Care Manager, Nursing Staff and >50% of time spent in counseling and coordination of care    Discussed:  Care Plan and D/C Planning    Prophylaxis:  H2B/PPI    Disposition:  SNF/LTC and  PT, OT, RN           ___________________________________________________    Attending Physician: Tony Smiley MD

## 2019-06-16 PROCEDURE — 74011250637 HC RX REV CODE- 250/637: Performed by: INTERNAL MEDICINE

## 2019-06-16 PROCEDURE — 74011250636 HC RX REV CODE- 250/636: Performed by: INTERNAL MEDICINE

## 2019-06-16 PROCEDURE — 65270000029 HC RM PRIVATE

## 2019-06-16 RX ORDER — AMITRIPTYLINE HYDROCHLORIDE 50 MG/1
50 TABLET, FILM COATED ORAL
Status: DISCONTINUED | OUTPATIENT
Start: 2019-06-16 | End: 2019-06-19 | Stop reason: HOSPADM

## 2019-06-16 RX ADMIN — ALLOPURINOL 100 MG: 100 TABLET ORAL at 08:53

## 2019-06-16 RX ADMIN — OXYCODONE HYDROCHLORIDE 5 MG: 5 TABLET ORAL at 13:15

## 2019-06-16 RX ADMIN — AVOBENZONE, HOMOSALATE, OCTISALATE, OCTOCRYLENE, AND OXYBENZONE 1 PACKET: 29.4; 147; 49; 25.4; 58.8 LOTION TOPICAL at 08:52

## 2019-06-16 RX ADMIN — OXYCODONE HYDROCHLORIDE 5 MG: 5 TABLET ORAL at 23:52

## 2019-06-16 RX ADMIN — AMITRIPTYLINE HYDROCHLORIDE 50 MG: 50 TABLET, FILM COATED ORAL at 12:01

## 2019-06-16 RX ADMIN — OXYCODONE HYDROCHLORIDE 5 MG: 5 TABLET ORAL at 06:53

## 2019-06-16 RX ADMIN — GABAPENTIN 300 MG: 100 CAPSULE ORAL at 17:29

## 2019-06-16 RX ADMIN — GABAPENTIN 300 MG: 100 CAPSULE ORAL at 22:29

## 2019-06-16 RX ADMIN — SERTRALINE HYDROCHLORIDE 50 MG: 50 TABLET ORAL at 08:53

## 2019-06-16 RX ADMIN — LISINOPRIL 40 MG: 20 TABLET ORAL at 08:53

## 2019-06-16 RX ADMIN — TAMSULOSIN HYDROCHLORIDE 0.4 MG: 0.4 CAPSULE ORAL at 08:53

## 2019-06-16 RX ADMIN — AVOBENZONE, HOMOSALATE, OCTISALATE, OCTOCRYLENE, AND OXYBENZONE 1 PACKET: 29.4; 147; 49; 25.4; 58.8 LOTION TOPICAL at 17:29

## 2019-06-16 RX ADMIN — LEVOTHYROXINE SODIUM 100 MCG: 100 TABLET ORAL at 06:53

## 2019-06-16 RX ADMIN — GABAPENTIN 300 MG: 100 CAPSULE ORAL at 08:53

## 2019-06-16 RX ADMIN — AMLODIPINE BESYLATE 10 MG: 5 TABLET ORAL at 08:53

## 2019-06-16 RX ADMIN — ACETAMINOPHEN 650 MG: 325 TABLET ORAL at 17:29

## 2019-06-16 RX ADMIN — PANTOPRAZOLE SODIUM 40 MG: 40 TABLET, DELAYED RELEASE ORAL at 06:53

## 2019-06-16 RX ADMIN — METHADONE HYDROCHLORIDE 130 MG: 10 CONCENTRATE ORAL at 08:53

## 2019-06-16 RX ADMIN — Medication 10 ML: at 13:16

## 2019-06-16 RX ADMIN — ENOXAPARIN SODIUM 40 MG: 40 INJECTION SUBCUTANEOUS at 08:53

## 2019-06-16 NOTE — PROGRESS NOTES
Bedside and Verbal shift change report given to Lakshmi Mayen  (oncoming nurse) by Carina Reyes  (offgoing nurse). Report included the following information SBAR and Kardex.

## 2019-06-16 NOTE — PROGRESS NOTES
[facility]    Medical Progress Note      NAME: Makenna Maria   :  1965  MRM:  [id number]    Date/Time: 2019  10:21 AM          Assessment and Plan:     Acute on chronic L foot wound infection - POA, now improved post I&D by podiatry on .  Wound cx polymicrobial, with stenotrophomonas, proteus, klebsiella. He already completed a 10 day course of IV ceftriaxone and tigecycline, ended on 06/10.  Follow up wound care and Dr. Hyla Burkitt (Vascular) for saphenous vein ablation. Physical debility / Hx of hemorrhagic CVA w/ L sided hemiparesis -  Due to prior psychological issues, chronic narcotics and homelessness, no SNF will accept him. CM working on home health services, but again no one will accept. Awaiting final arrangements. Continue PT/OT and ambulate as tolerated. His discharge has been delayed for 6 days so far due to this disposition problem.     HTN (hypertension) - POA, stable on norvasc, lisinopril     Hypothyroid - POA, TSH elevated due to non-compliance. Resumed synthroid.  Repeat TFTs in 3 weeks     Chronic pain / Chronic narcotic use / Anxiety and depression - continue methadone, gabapentin, miralax, sertraline, olanzepine, melatonin, prn oxycodone, but reduce to 5mg, add prn motrin and amitriptyline.     Hx of bladder CA - s/p surgery in 2018 at [facility] need to f/u with his Urologist. Royal Finger flomax    Hx gout - Allopurinol and Colchisine       Subjective:     Chief Complaint: Pain is worse. Neuropathic pain. Add amitriptyline. ROS:  (bold if positive, if negative)    Not Tolerating PT  Tolerating Diet        Objective:     Last 24hrs VS reviewed since prior progress note.  Most recent are:    Visit Vitals  /67 (BP 1 Location: Right arm, BP Patient Position: At rest)   Pulse (!) 58   Temp 97.9 °F (36.6 °C)   Resp 16   Ht 6' 1\" (1.854 m)   Wt 108.2 kg (238 lb 9.6 oz)   SpO2 94%   BMI 31.48 kg/m²     SpO2 Readings from Last 6 Encounters:   19 94% 05/27/19 95%   05/20/19 99%   05/10/19 92%   10/12/18 96%   09/28/18 95%          No intake or output data in the 24 hours ending 06/16/19 1021     Physical Exam:    Gen:  Obese, in no acute distress  HEENT:  Pink conjunctivae, PERRL, hearing intact to voice, moist mucous membranes  Neck:  Supple, without masses, thyroid non-tender  Resp:  No accessory muscle use, clear breath sounds without wheezes rales or rhonchi  Card:  No murmurs, normal S1, S2 without thrills, bruits or peripheral edema  Abd:  Soft, non-tender, non-distended, normoactive bowel sounds are present, no mass  Lymph:  No cervical or inguinal adenopathy  Musc:  No cyanosis or clubbing  Skin:  No rashes or ulcers, skin turgor is good  Neuro:  Cranial nerves are grossly intact, General motor weakness, follows commands   Psych:  Good insight, oriented to person, place and time, alert    Telemetry reviewed:   normal sinus rhythm  __________________________________________________________________  Medications Reviewed: (see below)  Medications:     Current Facility-Administered Medications   Medication Dose Route Frequency    amitriptyline (ELAVIL) tablet 50 mg  50 mg Oral QHS    ibuprofen (MOTRIN) tablet 400 mg  400 mg Oral Q6H PRN    oxyCODONE IR (ROXICODONE) tablet 5 mg  5 mg Oral Q6H PRN    psyllium husk-aspartame (METAMUCIL FIBER) packet 1 Packet  1 Packet Oral BID    gabapentin (NEURONTIN) capsule 300 mg  300 mg Oral TID    polyethylene glycol (MIRALAX) packet 17 g  17 g Oral DAILY    acetaminophen (TYLENOL) tablet 650 mg  650 mg Oral Q4H PRN    melatonin tablet 3 mg  3 mg Oral QHS PRN    methadone (DOLOPHINE) 10 mg/mL concentrated solution 130 mg  130 mg Oral DAILY    colchicine tablet 0.6 mg  0.6 mg Oral DAILY PRN    lisinopril (PRINIVIL, ZESTRIL) tablet 40 mg  40 mg Oral DAILY    amLODIPine (NORVASC) tablet 10 mg  10 mg Oral DAILY    senna-docusate (PERICOLACE) 8.6-50 mg per tablet 1 Tab  1 Tab Oral DAILY    sertraline (ZOLOFT) tablet 50 mg  50 mg Oral DAILY    allopurinol (ZYLOPRIM) tablet 100 mg  100 mg Oral DAILY    tamsulosin (FLOMAX) capsule 0.4 mg  0.4 mg Oral DAILY    nicotine (NICODERM CQ) 21 mg/24 hr patch 1 Patch  1 Patch TransDERmal DAILY PRN    OLANZapine (ZyPREXA) tablet 5 mg  5 mg Oral Q6H PRN    sodium chloride (NS) flush 5-40 mL  5-40 mL IntraVENous Q8H    sodium chloride (NS) flush 5-40 mL  5-40 mL IntraVENous PRN    diphenhydrAMINE (BENADRYL) capsule 25 mg  25 mg Oral Q4H PRN    ondansetron (ZOFRAN) injection 4 mg  4 mg IntraVENous Q4H PRN    enoxaparin (LOVENOX) injection 40 mg  40 mg SubCUTAneous Q24H    pantoprazole (PROTONIX) tablet 40 mg  40 mg Oral ACB    alum-mag hydroxide-simeth (MYLANTA) oral suspension 30 mL  30 mL Oral Q4H PRN    levothyroxine (SYNTHROID) tablet 100 mcg  100 mcg Oral ACB        Lab Data Reviewed: (see below)  Lab Review:     No results for input(s): WBC, HGB, HCT, PLT, HGBEXT, HCTEXT, PLTEXT, HGBEXT, HCTEXT, PLTEXT in the last 72 hours. No results for input(s): NA, K, CL, CO2, GLU, BUN, CREA, CA, MG, PHOS, ALB, TBIL, TBILI, SGOT, ALT, INR in the last 72 hours. No lab exists for component: INREXT, INREXT  Lab Results   Component Value Date/Time    Glucose (POC) 150 (H) 03/19/2018 11:45 AM    Glucose (POC) 123 (H) 03/18/2018 08:55 PM    Glucose (POC) 96 03/16/2018 04:58 PM    Glucose (POC) 116 (H) 12/12/2012 12:42 PM    Glucose (POC) 99 12/11/2012 11:27 PM     No results for input(s): PH, PCO2, PO2, HCO3, FIO2 in the last 72 hours. No results for input(s): INR in the last 72 hours.     No lab exists for component: Anuj Ask  All Micro Results     Procedure Component Value Units Date/Time    CULTURE, BLOOD, PAIRED [[id number]] Collected:  05/30/19 0000    Order Status:  Completed Specimen:  Blood Updated:  06/04/19 0508     Special Requests: NO SPECIAL REQUESTS        Culture result: NO GROWTH 5 DAYS       CULTURE, WOUND Verneice Gasmen STAIN [[id number]]  (Abnormal)  (Susceptibility) Collected:  05/30/19 1500    Order Status:  Completed Specimen:  Foot Updated:  06/03/19 0959     Special Requests: NO SPECIAL REQUESTS        GRAM STAIN NO WBC'S SEEN         3+ GRAM NEGATIVE RODS               2+ GRAM POSITIVE COCCOBACILLI           Culture result: HEAVY DIPHTHEROIDS               LIGHT STENOTROPHOMONAS ([place].) MALTOPHILIA CLSI GUIDELINES DO NOT RECOMMEND REPORTING SUSCEPTIBILITIES FOR S. MALTOPHILIA. TRIMETHOPRIM/SULFAMETHOXAZOLE IS THE DRUG OF CHOICE. FEW PROTEUS MIRABILIS         FEW KLEBSIELLA PNEUMONIAE       CULTURE, Mima Camel STAIN [[id number]]  (Abnormal)  (Susceptibility) Collected:  05/30/19 0141    Order Status:  Completed Specimen:  Wound Drainage Updated:  06/02/19 0920     Special Requests: NO SPECIAL REQUESTS        GRAM STAIN NO WBC'S SEEN               OCCASIONAL APPARENT GRAM NEGATIVE RODS           Culture result: LIGHT PROTEUS MIRABILIS               LIGHT STENOTROPHOMONAS ([place].) MALTOPHILIA CLSI GUIDELINES DO NOT RECOMMEND REPORTING SUSCEPTIBILITIES FOR S. MALTOPHILIA. TRIMETHOPRIM/SULFAMETHOXAZOLE IS THE DRUG OF CHOICE. HEAVY MIXED SKIN YULIA ISOLATED                Other pertinent lab: none    Total time spent with patient: 30 Minutes I reviewed chart, notes, data and current medications in the medical record. I have examined and treated the patient at bedside during this period.                  Care Plan discussed with: Patient, Care Manager, Nursing Staff and >50% of time spent in counseling and coordination of care    Discussed:  Care Plan and D/C Planning    Prophylaxis:  H2B/PPI    Disposition:  SNF/LTC and  PT, OT, RN           ___________________________________________________    Attending Physician: Erin Rosado MD

## 2019-06-16 NOTE — PROGRESS NOTES
Patients IV came out he is refusing a new one Dr Olamide Barron aware and ok with him not having an IV

## 2019-06-17 PROCEDURE — 65270000029 HC RM PRIVATE

## 2019-06-17 PROCEDURE — 74011250637 HC RX REV CODE- 250/637: Performed by: INTERNAL MEDICINE

## 2019-06-17 PROCEDURE — 74011250636 HC RX REV CODE- 250/636: Performed by: INTERNAL MEDICINE

## 2019-06-17 RX ORDER — IBUPROFEN 400 MG/1
400 TABLET ORAL
Qty: 30 TAB | Refills: 0 | Status: SHIPPED | OUTPATIENT
Start: 2019-06-17 | End: 2019-07-17

## 2019-06-17 RX ORDER — OXYCODONE HYDROCHLORIDE 5 MG/1
5 TABLET ORAL
Qty: 20 TAB | Refills: 0 | Status: SHIPPED | OUTPATIENT
Start: 2019-06-17 | End: 2019-06-20

## 2019-06-17 RX ORDER — AMITRIPTYLINE HYDROCHLORIDE 50 MG/1
50 TABLET, FILM COATED ORAL
Qty: 30 TAB | Refills: 0 | Status: SHIPPED | OUTPATIENT
Start: 2019-06-17 | End: 2019-07-17

## 2019-06-17 RX ADMIN — OXYCODONE HYDROCHLORIDE 5 MG: 5 TABLET ORAL at 14:10

## 2019-06-17 RX ADMIN — PANTOPRAZOLE SODIUM 40 MG: 40 TABLET, DELAYED RELEASE ORAL at 06:44

## 2019-06-17 RX ADMIN — GABAPENTIN 300 MG: 100 CAPSULE ORAL at 21:53

## 2019-06-17 RX ADMIN — GABAPENTIN 300 MG: 100 CAPSULE ORAL at 09:25

## 2019-06-17 RX ADMIN — ACETAMINOPHEN 650 MG: 325 TABLET ORAL at 23:33

## 2019-06-17 RX ADMIN — OXYCODONE HYDROCHLORIDE 5 MG: 5 TABLET ORAL at 06:44

## 2019-06-17 RX ADMIN — ACETAMINOPHEN 650 MG: 325 TABLET ORAL at 19:39

## 2019-06-17 RX ADMIN — LEVOTHYROXINE SODIUM 100 MCG: 100 TABLET ORAL at 06:44

## 2019-06-17 RX ADMIN — METHADONE HYDROCHLORIDE 130 MG: 10 CONCENTRATE ORAL at 09:25

## 2019-06-17 RX ADMIN — OXYCODONE HYDROCHLORIDE 5 MG: 5 TABLET ORAL at 20:12

## 2019-06-17 RX ADMIN — GABAPENTIN 300 MG: 100 CAPSULE ORAL at 16:56

## 2019-06-17 RX ADMIN — SERTRALINE HYDROCHLORIDE 50 MG: 50 TABLET ORAL at 09:25

## 2019-06-17 RX ADMIN — TAMSULOSIN HYDROCHLORIDE 0.4 MG: 0.4 CAPSULE ORAL at 09:25

## 2019-06-17 RX ADMIN — ALLOPURINOL 100 MG: 100 TABLET ORAL at 09:25

## 2019-06-17 RX ADMIN — AMITRIPTYLINE HYDROCHLORIDE 50 MG: 50 TABLET, FILM COATED ORAL at 21:53

## 2019-06-17 NOTE — PROGRESS NOTES
[facility]    Medical Progress Note      NAME: Sarah Guidno   :  1965  MRM:  [id number]    Date/Time: 2019  10:03 AM          Assessment and Plan:     Acute on chronic L foot wound infection - POA, now improved post I&D by podiatry on .  Wound cx polymicrobial, with stenotrophomonas, proteus, klebsiella. He already completed a 10 day course of IV ceftriaxone and tigecycline, ended on 06/10.  Follow up wound care and Dr. Chacho Crespo (Vascular) for saphenous vein ablation. Physical debility / Hx of hemorrhagic CVA w/ L sided hemiparesis -  Due to prior psychological issues, chronic narcotics and homelessness, no SNF will accept him. CM working on home health services, but again this is difficult. Awaiting final arrangements. Continue PT/OT and ambulate as tolerated. His discharge has been delayed for 7 days so far due to this disposition problem. Also, today, he considers refusing discharge.     HTN (hypertension) - POA, stable on norvasc, lisinopril     Hypothyroid - POA, TSH elevated due to non-compliance. Resumed synthroid.  Repeat TFTs in 3 weeks     Chronic pain / Chronic narcotic use / Anxiety and depression - continue methadone, gabapentin, miralax, sertraline, olanzepine, melatonin, prn oxycodone, but reduce to 5mg, add prn motrin and amitriptyline. He is anxious that he will not get pain medicine at home.     Hx of bladder CA - s/p surgery in 2018 at [facility] need to f/u with his Urologist. Gallo Pimentel flomax    Hx gout - Allopurinol and Colchisine       Subjective:     Chief Complaint: Pain is worse. Neuropathic pain. Add amitriptyline. ROS:  (bold if positive, if negative)    Not Tolerating PT  Tolerating Diet        Objective:     Last 24hrs VS reviewed since prior progress note.  Most recent are:    Visit Vitals  BP 98/65 (BP 1 Location: Right arm, BP Patient Position: At rest)   Pulse 61   Temp 97.8 °F (36.6 °C)   Resp 18   Ht 6' 1\" (1.854 m)   Wt 108.2 kg (238 lb 9.6 oz)   SpO2 98%   BMI 31.48 kg/m²     SpO2 Readings from Last 6 Encounters:   06/17/19 98%   05/27/19 95%   05/20/19 99%   05/10/19 92%   10/12/18 96%   09/28/18 95%            Intake/Output Summary (Last 24 hours) at 6/17/2019 1002  Last data filed at 6/17/2019 0320  Gross per 24 hour   Intake --   Output 600 ml   Net -600 ml        Physical Exam:    Gen:  Obese, in no acute distress  HEENT:  Pink conjunctivae, PERRL, hearing intact to voice, moist mucous membranes  Neck:  Supple, without masses, thyroid non-tender  Resp:  No accessory muscle use, clear breath sounds without wheezes rales or rhonchi  Card:  No murmurs, normal S1, S2 without thrills, bruits or peripheral edema  Abd:  Soft, non-tender, non-distended, normoactive bowel sounds are present, no mass  Lymph:  No cervical or inguinal adenopathy  Musc:  No cyanosis or clubbing  Skin:  No rashes or ulcers, skin turgor is good  Neuro:  Cranial nerves are grossly intact, General motor weakness, follows commands   Psych:  Good insight, oriented to person, place and time, alert    Telemetry reviewed:   normal sinus rhythm  __________________________________________________________________  Medications Reviewed: (see below)  Medications:     Current Facility-Administered Medications   Medication Dose Route Frequency    amitriptyline (ELAVIL) tablet 50 mg  50 mg Oral QHS    ibuprofen (MOTRIN) tablet 400 mg  400 mg Oral Q6H PRN    oxyCODONE IR (ROXICODONE) tablet 5 mg  5 mg Oral Q6H PRN    psyllium husk-aspartame (METAMUCIL FIBER) packet 1 Packet  1 Packet Oral BID    gabapentin (NEURONTIN) capsule 300 mg  300 mg Oral TID    polyethylene glycol (MIRALAX) packet 17 g  17 g Oral DAILY    acetaminophen (TYLENOL) tablet 650 mg  650 mg Oral Q4H PRN    melatonin tablet 3 mg  3 mg Oral QHS PRN    methadone (DOLOPHINE) 10 mg/mL concentrated solution 130 mg  130 mg Oral DAILY    colchicine tablet 0.6 mg  0.6 mg Oral DAILY PRN    lisinopril (PRINIVIL, ZESTRIL) tablet 40 mg  40 mg Oral DAILY    amLODIPine (NORVASC) tablet 10 mg  10 mg Oral DAILY    senna-docusate (PERICOLACE) 8.6-50 mg per tablet 1 Tab  1 Tab Oral DAILY    sertraline (ZOLOFT) tablet 50 mg  50 mg Oral DAILY    allopurinol (ZYLOPRIM) tablet 100 mg  100 mg Oral DAILY    tamsulosin (FLOMAX) capsule 0.4 mg  0.4 mg Oral DAILY    nicotine (NICODERM CQ) 21 mg/24 hr patch 1 Patch  1 Patch TransDERmal DAILY PRN    OLANZapine (ZyPREXA) tablet 5 mg  5 mg Oral Q6H PRN    sodium chloride (NS) flush 5-40 mL  5-40 mL IntraVENous Q8H    sodium chloride (NS) flush 5-40 mL  5-40 mL IntraVENous PRN    diphenhydrAMINE (BENADRYL) capsule 25 mg  25 mg Oral Q4H PRN    ondansetron (ZOFRAN) injection 4 mg  4 mg IntraVENous Q4H PRN    enoxaparin (LOVENOX) injection 40 mg  40 mg SubCUTAneous Q24H    pantoprazole (PROTONIX) tablet 40 mg  40 mg Oral ACB    alum-mag hydroxide-simeth (MYLANTA) oral suspension 30 mL  30 mL Oral Q4H PRN    levothyroxine (SYNTHROID) tablet 100 mcg  100 mcg Oral ACB        Lab Data Reviewed: (see below)  Lab Review:     No results for input(s): WBC, HGB, HCT, PLT, HGBEXT, HCTEXT, PLTEXT, HGBEXT, HCTEXT, PLTEXT in the last 72 hours. No results for input(s): NA, K, CL, CO2, GLU, BUN, CREA, CA, MG, PHOS, ALB, TBIL, TBILI, SGOT, ALT, INR in the last 72 hours. No lab exists for component: INREXT, INREXT  Lab Results   Component Value Date/Time    Glucose (POC) 150 (H) 03/19/2018 11:45 AM    Glucose (POC) 123 (H) 03/18/2018 08:55 PM    Glucose (POC) 96 03/16/2018 04:58 PM    Glucose (POC) 116 (H) 12/12/2012 12:42 PM    Glucose (POC) 99 12/11/2012 11:27 PM     No results for input(s): PH, PCO2, PO2, HCO3, FIO2 in the last 72 hours. No results for input(s): INR in the last 72 hours.     No lab exists for component: Rinda File  All Micro Results     Procedure Component Value Units Date/Time    CULTURE, BLOOD, PAIRED [[id number]] Collected:  05/30/19 0000    Order Status:  Completed Specimen:  Blood Updated:  06/04/19 0508     Special Requests: NO SPECIAL REQUESTS        Culture result: NO GROWTH 5 DAYS       CULTURE, WOUND Yovani Freud STAIN [[id number]]  (Abnormal)  (Susceptibility) Collected:  05/30/19 1500    Order Status:  Completed Specimen:  Foot Updated:  06/03/19 0959     Special Requests: NO SPECIAL REQUESTS        GRAM STAIN NO WBC'S SEEN         3+ GRAM NEGATIVE RODS               2+ GRAM POSITIVE COCCOBACILLI           Culture result: HEAVY DIPHTHEROIDS               LIGHT STENOTROPHOMONAS (Keeley Escoto.) MALTOPHILIA CLSI GUIDELINES DO NOT RECOMMEND REPORTING SUSCEPTIBILITIES FOR S. MALTOPHILIA. TRIMETHOPRIM/SULFAMETHOXAZOLE IS THE DRUG OF CHOICE. FEW PROTEUS MIRABILIS         FEW KLEBSIELLA PNEUMONIAE       CULTURE, Amy Les STAIN [[id number]]  (Abnormal)  (Susceptibility) Collected:  05/30/19 0141    Order Status:  Completed Specimen:  Wound Drainage Updated:  06/02/19 0920     Special Requests: NO SPECIAL REQUESTS        GRAM STAIN NO WBC'S SEEN               OCCASIONAL APPARENT GRAM NEGATIVE RODS           Culture result: LIGHT PROTEUS MIRABILIS               LIGHT STENOTROPHOMONAS (Keelye Escoto.) MALTOPHILIA CLSI GUIDELINES DO NOT RECOMMEND REPORTING SUSCEPTIBILITIES FOR S. MALTOPHILIA. TRIMETHOPRIM/SULFAMETHOXAZOLE IS THE DRUG OF CHOICE. HEAVY MIXED SKIN YULIA ISOLATED                Other pertinent lab: none    Total time spent with patient: 30 Minutes I reviewed chart, notes, data and current medications in the medical record. I have examined and treated the patient at bedside during this period.                  Care Plan discussed with: Patient, Care Manager, Nursing Staff and >50% of time spent in counseling and coordination of care    Discussed:  Care Plan and D/C Planning    Prophylaxis:  H2B/PPI    Disposition:  SNF/LTC and HH PT, OT, RN           ___________________________________________________    Attending Physician: Ariadne Andres MD

## 2019-06-17 NOTE — PROGRESS NOTES
Pharmacist Discharge Medication Reconciliation    Discharge Provider:  Dr. Myranda Hills       Discharge Medications:      My Medications        START taking these medications        Instructions Each Dose to Equal Morning Noon Evening Bedtime   amitriptyline 50 mg tablet  Commonly known as:  ELAVIL      Take 1 Tab by mouth nightly for 30 days. 50 mg         gabapentin 300 mg capsule  Commonly known as:  NEURONTIN      Take 1 Cap by mouth three (3) times daily for 30 days. 300 mg         ibuprofen 400 mg tablet  Commonly known as:  MOTRIN      Take 1 Tab by mouth every six (6) hours as needed for Pain for up to 30 days. 400 mg         oxyCODONE IR 5 mg immediate release tablet  Commonly known as:  ROXICODONE      Take 1 Tab by mouth every six (6) hours as needed for Pain for up to 3 days. Max Daily Amount: 20 mg.   5 mg         polyethylene glycol 17 gram packet  Commonly known as:  MIRALAX      Take 1 Packet by mouth daily for 30 days. 17 g         psyllium husk-aspartame 3.4 gram Pwpk packet  Commonly known as:  METAMUCIL FIBER      Take 1 Packet by mouth two (2) times a day for 30 days. 1 Packet                CONTINUE taking these medications        Instructions Each Dose to Equal Morning Noon Evening Bedtime   acetaminophen 325 mg tablet  Commonly known as:  TYLENOL      Take 2 Tabs by mouth every four (4) hours as needed. Indications: Arthritic Pain   650 mg         allopurinol 100 mg tablet  Commonly known as:  ZYLOPRIM      Take 1 Tab by mouth daily. Indications: treatment to prevent acute gout attack   100 mg         amLODIPine 10 mg tablet  Commonly known as:  NORVASC      Take 1 Tab by mouth daily. 10 mg         colchicine 0.6 mg tablet      Take 0.6 mg by mouth daily as needed (gout flare). 0.6 mg         levothyroxine 100 mcg tablet  Commonly known as:  SYNTHROID      Take 1 Tab by mouth Daily (before breakfast).  Indications: hypothyroidism   100 mcg         lisinopril 20 mg tablet  Commonly known as:  PRINIVIL, ZESTRIL      Take 2 Tabs by mouth daily. Indications: high blood pressure   40 mg         melatonin 3 mg tablet      Take 1 Tab by mouth nightly as needed. 3 mg         methadone 10 mg/mL solution  Commonly known as:  DOLOPHINE      Take 130 mg by mouth daily. 130 mg         DARELL-COLACE 8.6-50 mg per tablet  Generic drug:  senna-docusate      Take 1 Tab by mouth daily. 1 Tab         sertraline 50 mg tablet  Commonly known as:  ZOLOFT      Take 1 Tab by mouth daily. Indications: major depressive disorder   50 mg         tamsulosin 0.4 mg capsule  Commonly known as:  FLOMAX      Take 1 Cap by mouth daily. Indications: bladder cancer   0.4 mg                   Where to Get Your Medications        Information on where to get these meds will be given to you by the nurse or doctor.     Ask your nurse or doctor about these medications  amitriptyline 50 mg tablet  gabapentin 300 mg capsule  ibuprofen 400 mg tablet  oxyCODONE IR 5 mg immediate release tablet  polyethylene glycol 17 gram packet  psyllium husk-aspartame 3.4 gram Pwpk packet       The patient's chart, MAR, and AVS were reviewed by   ASHLEY Ivory,   Contact: [phone]

## 2019-06-17 NOTE — DISCHARGE INSTRUCTIONS
HOSPITALIST DISCHARGE INSTRUCTIONS    NAME: Lidia Acosta   :  1965   MRN:  [id number]     Date:     2019    ADMIT DATE: 2019     DISCHARGE DATE: 2019     PRINCIPAL ADMITTING DIAGNOSIS:  Open wound, lower leg [S81.809A]    DISCHARGE DIAGNOSES:  Active Problems:    Open wound, lower leg (2019)    HTN (hypertension) (2017)    Physical debility (2017)    Chronic pain disorder (2018)    Hypothyroidism (10/12/2018)    Depression (2019)    MEDICATIONS:    · It is important that medications are taken exactly as they are prescribed on the discharge medication instructions and keep them your  in the bottles provided by the pharmacist.   · Keep a list of the medication names, dosages, and times to be taken at all times. · Do not take other medications without consulting your doctor. Recommended diet:  Regular Diet    Recommended activity: Activity as tolerated    Post discharge care:    Notify follow up health care provider or return to the emergency department if you cannot get hold of your doctor if you feel worse or experience symptoms similar to those that brought you to hospital    Follow-up Information     Follow up With Specialties Details Why Contact Info    Jo Ann Lopez MD Internal Medicine Schedule an appointment as soon as possible for a visit in 1 day  [address] [place] [address] [address] [place] [address]  [facility]      Justin Meyer MD General and Vascular Surgery Schedule an appointment as soon as possible for a visit For review of progress  [facility]  [address] [place] [address] [address] [phone]            Information obtained by :  I understand that if any problems occur once I am at home I am to contact my physician and I understand and acknowledge receipt of the instructions indicated above. Physician's or R.N.'s Signature                                                                  Date/Time                                                                                                                                              Patient or Representative Signature                                                          Date/Time

## 2019-06-17 NOTE — WOUND CARE
Wound care consult- follow up visit for wound assessment and dressing change- assessed with Dr Priscilla Mcgee at bedside. Assessment  Dressings removed. Moderate amount of light yellow drainage- painful with cleansing. Wounds pale with new granulation, small amount of light yellow slough, no odor ludwig wound intact, no redness, skin is peeling and dry on plantar surface-   Leg is less edematous and erythemic. Treatment  Wounds cleansed and leg moisturized, moist hydrofera blue applied to wound, dressing reapplied, tubigrip - prevalon boot, elevated. Supplies gathered for [facility],  Care manager aware of assessment and continued orders.   Leyla Sandoval

## 2019-06-17 NOTE — PROGRESS NOTES
CM sent several [facility] referrals: [facility] was unable to accept due Pt's history of non compliance with the agency. [facility] unable to accept [employer]. At home care is able to accept. Pt was notified. CM was informed by Pt that he has not been able to contact his caregiver, stating that she has the paperwork and keys to his apartment. CM inquired if they could reach out to caregiver Duy Clark. Pt provided CM with contact [phone]. CM attempted to contact Duy Clark with number provided, the number appears to be a non-working number. CM also called the number listed on Pt's chart: [phone] CM was sent to a voicemail for a [employer]ating and [address] CM followed up with Pt along with CM supervisor. Pt expressed frustration with not being able to contact Venkata. Pt reached out to apartment complex while CM and supervisor were present. It was confirmed that caregiver has not been by office today with paperwork. Pt stated that he would continue to make contact associates of caregiver to hopefully make contact with Duy Clark.        [address], [facility], [address] [place] [address]

## 2019-06-17 NOTE — PROGRESS NOTES
Discharge papers and RX for amitriptyline, gabapentin, ibuprofen, oxycodone, miralax, and metamucil fiber. Wound care supplies for a least 2 treatments and note for PT's methadone clinic all given. Pt has no PIV lines and is ready to be discharged as soon as he can get in touch with his caregiver Rachael Marie.

## 2019-06-17 NOTE — PROGRESS NOTES
6/17/2019      RE: Flor Conroy      To Whom it May Concern: This is to certify that Flor Conroy was under the care of the hospitalist team at [facility] from May 30 to June 19, 2019. He received his daily dose of 130mg methadone every day during this time period. No prescription for methadone was provided, as it is expected he will continue to receive his methadone through his usual clinic starting June 20. Please feel free to contact my office if you have any questions or concerns. Thank you for your assistance in this matter.     Sincerely,      Jose Randle MD

## 2019-06-17 NOTE — PROGRESS NOTES
Bedside and Verbal shift change report given to Aris Christine (oncoming nurse) by ΣΑΡΑΝΤΙ (offgoing nurse). Report included the following information SBAR, Kardex, Intake/Output, MAR and Recent Results.

## 2019-06-17 NOTE — PROGRESS NOTES
Discharge order noted. Peyman , working on home health care needs. Patient aware of discharge order.

## 2019-06-17 NOTE — ROUTINE PROCESS
Bedside and Verbal shift change report given to Aiyana Vidales RN (oncoming nurse) by Bailey Donald RN (offgoing nurse). Report included the following information SBAR, Kardex, Intake/Output, MAR and Accordion.

## 2019-06-17 NOTE — PROGRESS NOTES
0800: Performed dressing change of left foot per orders; patient tolerated well. However, patient stated that he prefers the use of acticoat flex 3 versus the hydrofera dressing that was removed and reapplied today per orders.

## 2019-06-18 PROCEDURE — 74011250637 HC RX REV CODE- 250/637: Performed by: INTERNAL MEDICINE

## 2019-06-18 PROCEDURE — 65270000029 HC RM PRIVATE

## 2019-06-18 PROCEDURE — 74011250636 HC RX REV CODE- 250/636: Performed by: INTERNAL MEDICINE

## 2019-06-18 RX ADMIN — OXYCODONE HYDROCHLORIDE 5 MG: 5 TABLET ORAL at 02:10

## 2019-06-18 RX ADMIN — GABAPENTIN 300 MG: 100 CAPSULE ORAL at 15:39

## 2019-06-18 RX ADMIN — AMITRIPTYLINE HYDROCHLORIDE 50 MG: 50 TABLET, FILM COATED ORAL at 21:26

## 2019-06-18 RX ADMIN — LEVOTHYROXINE SODIUM 100 MCG: 100 TABLET ORAL at 06:35

## 2019-06-18 RX ADMIN — SERTRALINE HYDROCHLORIDE 50 MG: 50 TABLET ORAL at 08:55

## 2019-06-18 RX ADMIN — AVOBENZONE, HOMOSALATE, OCTISALATE, OCTOCRYLENE, AND OXYBENZONE 1 PACKET: 29.4; 147; 49; 25.4; 58.8 LOTION TOPICAL at 08:55

## 2019-06-18 RX ADMIN — LISINOPRIL 40 MG: 20 TABLET ORAL at 08:55

## 2019-06-18 RX ADMIN — ALLOPURINOL 100 MG: 100 TABLET ORAL at 08:55

## 2019-06-18 RX ADMIN — METHADONE HYDROCHLORIDE 130 MG: 10 CONCENTRATE ORAL at 08:55

## 2019-06-18 RX ADMIN — OXYCODONE HYDROCHLORIDE 5 MG: 5 TABLET ORAL at 21:26

## 2019-06-18 RX ADMIN — AMLODIPINE BESYLATE 10 MG: 5 TABLET ORAL at 08:55

## 2019-06-18 RX ADMIN — ACETAMINOPHEN 650 MG: 325 TABLET ORAL at 10:44

## 2019-06-18 RX ADMIN — OXYCODONE HYDROCHLORIDE 5 MG: 5 TABLET ORAL at 08:55

## 2019-06-18 RX ADMIN — ENOXAPARIN SODIUM 40 MG: 40 INJECTION SUBCUTANEOUS at 09:00

## 2019-06-18 RX ADMIN — PANTOPRAZOLE SODIUM 40 MG: 40 TABLET, DELAYED RELEASE ORAL at 06:36

## 2019-06-18 RX ADMIN — AVOBENZONE, HOMOSALATE, OCTISALATE, OCTOCRYLENE, AND OXYBENZONE 1 PACKET: 29.4; 147; 49; 25.4; 58.8 LOTION TOPICAL at 18:00

## 2019-06-18 RX ADMIN — GABAPENTIN 300 MG: 100 CAPSULE ORAL at 08:55

## 2019-06-18 RX ADMIN — GABAPENTIN 300 MG: 100 CAPSULE ORAL at 21:26

## 2019-06-18 RX ADMIN — ACETAMINOPHEN 650 MG: 325 TABLET ORAL at 06:36

## 2019-06-18 RX ADMIN — TAMSULOSIN HYDROCHLORIDE 0.4 MG: 0.4 CAPSULE ORAL at 08:55

## 2019-06-18 RX ADMIN — OXYCODONE HYDROCHLORIDE 5 MG: 5 TABLET ORAL at 15:39

## 2019-06-18 NOTE — PROGRESS NOTES
CM spoke with Pt. Pt informed CM that he has been trying to reach out to caregiver, Pt stated that the number provided  yesterday [phone] is now ringing. CM attempted to contact exact number, as it does ring, CM is unable to leave a voicemail.     2:00pm: Update: CM met with Pt to follow-up on making contact with caregiver. Pt stated that Pt would be coming to the hospital today. Pt stated that he and his caregiver were communicating via text.     3:20pm: CM met with Pt to see if Pt has had any communication with caregiver Pt stated that he has not. CM discussed other options with Pt, such as medical respite, Pt stated that he would not be willing to go to the [facility]. Pt stated that he is confident that Henrique Bryson will pick him up today from the hospital.     CM has since made several attempts to contact Henrique Bryson. Still unable to reach her. CM will continue to follow for discharge planning.        Aris Gregorio, [address] [place] [address]

## 2019-06-18 NOTE — PROGRESS NOTES
Nutrition Assessment:    RECOMMENDATIONS/INTERVENTION(S):     1. Continue with Regular diet order. 2. Ten and Gelatein BID. Encourage to mix Ten with juice for increased acceptance    3. Monitor Po intakes of meals/ ONS, protein intake, weight, BM's. ASSESSMENT:   6/18: pt seen for f/u. Care Manager attempting to contact pt caregiver to proceed with discharge. Visited pt who reports he has a good appetite and is eating well. Does not like the taste too well of the Ten, advised to try mixing it with juice, currently he mixes with water. Noted current wt: 242, thank you for obtaining weight. Pt reports a UBW: in the 240's, no significant weight change noted. 6/11: Follow up. Pt eating well. Continue ONS for large stature and increased need for protein. Awaiting placement. Insurance and placement have been an issue. PO is good, pt eating >75% of meals on average. No new weight taken since admission. Reweigh pt monitor trends. Labs reviewed. 6/4: 45 yo male admitted for open wound. Consult received for increased protein needs. PMhx: CVA, bladder CA, gout, HTN, hypercholesterolemia, seizures, ETOH abuse, chronic pain and methadone use. Class I obese per BMI. Weight has increased by 15lbs over last year per wt hx in EMR. Regular diet ordered. Pt reports very good appetite here and PTA. Intakes recorded as 75-90%. Pt has questions regarding his protein intake and how to \"increase his protein levels\". Educated pt on what foods contain protein and discussed supplement options for additional protein. Pt interested in protein supplements that do not contain a lot of calories because he does not want to keep gaining weight. Willing to try Ten and [address] Left foot ulcer s/p I &D on 5/25. Discussed different types of fiber and what foods they are found in to help with regular BM's.   Pt states he takes chronic pain meds and therefore has to take medicine to help him have BM's but now he is c/o of his stools being too soft. Labs and meds reviewed. Diet Order: Regular , Ten @ breakfast and lunch, Gelatein 20 at [employer] and dinner. Patient Vitals for the past 100 hrs:   % Diet Eaten   06/15/19 1000 100 %       Pertinent Medications: [x] Reviewed    Labs: [x] Reviewed    Anthropometrics: Height: 6' 1\" (185.4 cm) Weight: 109.9 kg (242 lb 4.6 oz)    IBW (%IBW): 83.5 kg (184 lb) (131.68 %) UBW (%UBW): 108.9 kg (240 lb) (100.95 %)      BMI: Body mass index is 31.97 kg/m². This BMI is indicative of:   [] Underweight    [] Normal    [] Overweight    [x]  Class I Obesity    []  Extreme Obesity (BMI>40)  Estimated Nutrition Needs (Based on): 9069 Kcals/day(BMR 1982 x AF 1.3) , 109 g(109-120gm (1-1.1gm/kg/d)) Protein  Carbohydrate: At Least 130 g/day  Fluids: 2576 mL/day (1 ml/kcal)    Last BM: 6/17   [x]Active     []Hyperactive  []Hypoactive       [] Absent   BS  Skin:    [] Intact   [] Incision  [x] Breakdown: WOCN 6/13: L lateral heel, L dorsal foot left foot wounds  [] DTI   [] Tears/Excoriation/Abrasion  [x]Edema: LLE, RLE [] Other:    Wt Readings from Last 30 Encounters:   06/18/19 109.9 kg (242 lb 4.6 oz)   05/20/19 108.9 kg (240 lb)   04/25/19 108.9 kg (240 lb)   04/24/19 108.9 kg (240 lb)   09/28/18 103.4 kg (228 lb)   09/28/18 103.4 kg (228 lb)   09/05/18 101.2 kg (223 lb)   08/08/18 101.2 kg (223 lb)   07/31/18 104.3 kg (230 lb)   06/05/18 102.3 kg (225 lb 8.5 oz)   06/04/18 104.3 kg (230 lb)   03/13/18 100.4 kg (221 lb 5.5 oz)   03/14/18 100.2 kg (221 lb)   03/06/18 106.1 kg (234 lb)   12/21/17 94.3 kg (208 lb)   08/13/17 94.3 kg (208 lb)   01/27/17 99.2 kg (218 lb 9.6 oz)   01/06/17 99.3 kg (219 lb)   12/13/16 105.1 kg (231 lb 12.8 oz)   10/14/16 102 kg (224 lb 13.9 oz)   09/10/16 108.4 kg (239 lb)   06/02/16 108.5 kg (239 lb 4 oz)   11/09/15 101.6 kg (224 lb)   02/12/13 109.8 kg (242 lb)   02/07/13 108.9 kg (240 lb)   01/30/13 104.3 kg (230 lb)   12/12/12 104.5 kg (230 lb 6.4 oz) 08/30/12 105.2 kg (232 lb)   04/10/12 107 kg (236 lb)   01/25/12 107 kg (236 lb)      NUTRITION DIAGNOSES:   Problem:  Increased nutrient needs     Etiology: related to increased demand for protein     Signs/Symptoms: as evidenced by left foot ulcer requiring multiple I &D's      NUTRITION INTERVENTIONS:  Meals/Snacks: General/healthful diet   Supplements: Commercial supplement              GOAL:   Pt will continue to eat well at meals, 75% or more with ONS: Ten and Gelatein BID daily prior to discharge    Cultural, Worship, or Ethnic Dietary Needs: None     EDUCATION & DISCHARGE NEEDS:    [] None Identified   [x] Identified and Education Provided/Documented- educated pt on protein content of foods and fiber   [] Identified and Pt declined/was not appropriate      [x] Interdisciplinary Care Plan Reviewed/Documented    [x] Discharge Needs:   Follow high protein diet at home to help with wound healing   [] No Nutrition Related Discharge Needs    NUTRITION RISK:   Pt Is At Nutrition Risk  [x]     No Nutrition Risk Identified  []       PT SEEN FOR:    []  MD Consult: []Calorie Count      []Diabetic Diet Education        []Diet Education     []Electrolyte Management     []General Nutrition Management and Supplements     []Management of Tube Feeding     []TPN Recommendations    []  RN Referral:  []MST score >=2     []Enteral/Parenteral Nutrition PTA     []Pregnant: Gestational DM or Multigestation                 [] Pressure Ulcer    []  Low BMI      []  Length of Stay       [] Dysphagia Diet         [] Ventilator  [x]  Follow-up     Previous Recommendations:   [x] Implemented          [] Not Implemented          [] Not Applicable    Previous Goal:   [x] Met              [] Progressing Towards Goal              [] Not Progressing Towards Goal   [] Not Applicable            David Merida, [address]  Pager [phone]  Phone [phone]

## 2019-06-18 NOTE — PROGRESS NOTES
[facility]    Medical Progress Note      NAME: Junito Soriano   :  1965  MRM:  [id number]    Date/Time: 2019  8:05 AM          Assessment and Plan:     Acute on chronic L foot wound infection - POA, now improved post I&D by podiatry on .  Wound cx polymicrobial, with stenotrophomonas, proteus, klebsiella. He already completed a 10 day course of IV ceftriaxone and tigecycline, ended on 06/10.  Follow up wound care and Dr. Syed Dudley (Vascular) for saphenous vein ablation. Physical debility / Hx of hemorrhagic CVA w/ L sided hemiparesis -  Due to prior psychological issues, chronic narcotics and homelessness, no SNF will accept him. CM working on home health services, but again this is difficult. Awaiting final arrangements. Continue PT/OT and ambulate as tolerated. His discharge has been delayed for 8 days so far due to this disposition problem. Yesterday it was found that none of his paperwork for his accommodations has been done.     HTN (hypertension) - POA, stable on norvasc, lisinopril     Hypothyroid - POA, TSH elevated due to non-compliance. Resumed synthroid.  Repeat TFTs in 3 weeks     Chronic pain / Chronic narcotic use / Anxiety and depression - continue methadone, gabapentin, miralax, sertraline, olanzepine, melatonin, prn oxycodone, but reduce to 5mg, add prn motrin and amitriptyline. He is anxious that he will not get pain medicine at home.     Hx of bladder CA - s/p surgery in 2018 at [facility] need to f/u with his Urologist. Rashaad pacheco    Hx gout - Allopurinol and Colchisine       Subjective:     Chief Complaint: Pain is stable. Neuropathic pain. ROS:  (bold if positive, if negative)    Not Tolerating much PT  Tolerating Diet        Objective:     Last 24hrs VS reviewed since prior progress note.  Most recent are:    Visit Vitals  /76 (BP 1 Location: Right arm, BP Patient Position: At rest)   Pulse (!) 55   Temp 97.5 °F (36.4 °C)   Resp 18   Ht 6' 1\" (1.854 m)   Wt 108.2 kg (238 lb 9.6 oz)   SpO2 95%   BMI 31.48 kg/m²     SpO2 Readings from Last 6 Encounters:   06/18/19 95%   05/27/19 95%   05/20/19 99%   05/10/19 92%   10/12/18 96%   09/28/18 95%            Intake/Output Summary (Last 24 hours) at 6/18/2019 0805  Last data filed at 6/18/2019 0210  Gross per 24 hour   Intake 360 ml   Output 1000 ml   Net -640 ml        Physical Exam:    Gen:  Obese, in no acute distress  HEENT:  Pink conjunctivae, PERRL, hearing intact to voice, moist mucous membranes  Neck:  Supple, without masses, thyroid non-tender  Resp:  No accessory muscle use, clear breath sounds without wheezes rales or rhonchi  Card:  No murmurs, normal S1, S2 without thrills, bruits or peripheral edema  Abd:  Soft, non-tender, non-distended, normoactive bowel sounds are present, no mass  Lymph:  No cervical or inguinal adenopathy  Musc:  No cyanosis or clubbing  Skin:  No rashes or ulcers, skin turgor is good  Neuro:  Cranial nerves are grossly intact, General motor weakness, follows commands   Psych:  Good insight, oriented to person, place and time, alert    Telemetry reviewed:   normal sinus rhythm  __________________________________________________________________  Medications Reviewed: (see below)  Medications:     Current Facility-Administered Medications   Medication Dose Route Frequency    amitriptyline (ELAVIL) tablet 50 mg  50 mg Oral QHS    ibuprofen (MOTRIN) tablet 400 mg  400 mg Oral Q6H PRN    oxyCODONE IR (ROXICODONE) tablet 5 mg  5 mg Oral Q6H PRN    psyllium husk-aspartame (METAMUCIL FIBER) packet 1 Packet  1 Packet Oral BID    gabapentin (NEURONTIN) capsule 300 mg  300 mg Oral TID    polyethylene glycol (MIRALAX) packet 17 g  17 g Oral DAILY    acetaminophen (TYLENOL) tablet 650 mg  650 mg Oral Q4H PRN    melatonin tablet 3 mg  3 mg Oral QHS PRN    methadone (DOLOPHINE) 10 mg/mL concentrated solution 130 mg  130 mg Oral DAILY    colchicine tablet 0.6 mg  0.6 mg Oral DAILY PRN    lisinopril (PRINIVIL, ZESTRIL) tablet 40 mg  40 mg Oral DAILY    amLODIPine (NORVASC) tablet 10 mg  10 mg Oral DAILY    senna-docusate (PERICOLACE) 8.6-50 mg per tablet 1 Tab  1 Tab Oral DAILY    sertraline (ZOLOFT) tablet 50 mg  50 mg Oral DAILY    allopurinol (ZYLOPRIM) tablet 100 mg  100 mg Oral DAILY    tamsulosin (FLOMAX) capsule 0.4 mg  0.4 mg Oral DAILY    nicotine (NICODERM CQ) 21 mg/24 hr patch 1 Patch  1 Patch TransDERmal DAILY PRN    OLANZapine (ZyPREXA) tablet 5 mg  5 mg Oral Q6H PRN    sodium chloride (NS) flush 5-40 mL  5-40 mL IntraVENous Q8H    sodium chloride (NS) flush 5-40 mL  5-40 mL IntraVENous PRN    diphenhydrAMINE (BENADRYL) capsule 25 mg  25 mg Oral Q4H PRN    ondansetron (ZOFRAN) injection 4 mg  4 mg IntraVENous Q4H PRN    enoxaparin (LOVENOX) injection 40 mg  40 mg SubCUTAneous Q24H    pantoprazole (PROTONIX) tablet 40 mg  40 mg Oral ACB    alum-mag hydroxide-simeth (MYLANTA) oral suspension 30 mL  30 mL Oral Q4H PRN    levothyroxine (SYNTHROID) tablet 100 mcg  100 mcg Oral ACB        Lab Data Reviewed: (see below)  Lab Review:     No results for input(s): WBC, HGB, HCT, PLT, HGBEXT, HCTEXT, PLTEXT, HGBEXT, HCTEXT, PLTEXT in the last 72 hours. No results for input(s): NA, K, CL, CO2, GLU, BUN, CREA, CA, MG, PHOS, ALB, TBIL, TBILI, SGOT, ALT, INR in the last 72 hours. No lab exists for component: INREXT, INREXT  Lab Results   Component Value Date/Time    Glucose (POC) 150 (H) 03/19/2018 11:45 AM    Glucose (POC) 123 (H) 03/18/2018 08:55 PM    Glucose (POC) 96 03/16/2018 04:58 PM    Glucose (POC) 116 (H) 12/12/2012 12:42 PM    Glucose (POC) 99 12/11/2012 11:27 PM     No results for input(s): PH, PCO2, PO2, HCO3, FIO2 in the last 72 hours. No results for input(s): INR in the last 72 hours.     No lab exists for component: Prachi Martinez  All Micro Results     Procedure Component Value Units Date/Time    CULTURE, BLOOD, PAIRED [[id number]] Collected:  05/30/19 0000    Order Status:  Completed Specimen:  Blood Updated:  06/04/19 0508     Special Requests: NO SPECIAL REQUESTS        Culture result: NO GROWTH 5 DAYS       CULTURE, WOUND Jazmin Bible STAIN [[id number]]  (Abnormal)  (Susceptibility) Collected:  05/30/19 1500    Order Status:  Completed Specimen:  Foot Updated:  06/03/19 0959     Special Requests: NO SPECIAL REQUESTS        GRAM STAIN NO WBC'S SEEN         3+ GRAM NEGATIVE RODS               2+ GRAM POSITIVE COCCOBACILLI           Culture result: HEAVY DIPHTHEROIDS               LIGHT STENOTROPHOMONAS (Saniya Brash.) MALTOPHILIA CLSI GUIDELINES DO NOT RECOMMEND REPORTING SUSCEPTIBILITIES FOR S. MALTOPHILIA. TRIMETHOPRIM/SULFAMETHOXAZOLE IS THE DRUG OF CHOICE. FEW PROTEUS MIRABILIS         FEW KLEBSIELLA PNEUMONIAE       CULTURE, Jaswinder Plants STAIN [[id number]]  (Abnormal)  (Susceptibility) Collected:  05/30/19 0141    Order Status:  Completed Specimen:  Wound Drainage Updated:  06/02/19 0920     Special Requests: NO SPECIAL REQUESTS        GRAM STAIN NO WBC'S SEEN               OCCASIONAL APPARENT GRAM NEGATIVE RODS           Culture result: LIGHT PROTEUS MIRABILIS               LIGHT STENOTROPHOMONAS (Saniya Brash.) MALTOPHILIA CLSI GUIDELINES DO NOT RECOMMEND REPORTING SUSCEPTIBILITIES FOR S. MALTOPHILIA. TRIMETHOPRIM/SULFAMETHOXAZOLE IS THE DRUG OF CHOICE. HEAVY MIXED SKIN YULIA ISOLATED                Other pertinent lab: none    Total time spent with patient: 30 Minutes I reviewed chart, notes, data and current medications in the medical record. I have examined and treated the patient at bedside during this period.                  Care Plan discussed with: Patient, Care Manager, Nursing Staff and >50% of time spent in counseling and coordination of care    Discussed:  Care Plan and D/C Planning    Prophylaxis:  H2B/PPI    Disposition:  SNF/LTC and HH PT, OT, RN           ___________________________________________________    Attending Physician: Brianna Chang MD

## 2019-06-18 NOTE — ADT AUTH CERT NOTES
Utilization Reviews         Sepsis and Other Febrile Illness, without Focal Infection - Care Day 18 (6/16/2019) by Jeromy Rivers RN         Review Status Review Entered   Completed 6/17/2019 19:09       Criteria Review      Care Day: 18 Care Date: 6/16/2019 Level of Care: Inpatient Floor   Guideline Day 4    Clinical Status   (X) * Hemodynamic stability    6/17/2019 19:09:28 EDT by Jeromy Rivers    VS: 112/67, 58, 16, 97.9, 94%       (X) * Fever absent or acceptable for next level of care    (X) * Hypoxemia absent    (X) * Tachypnea absent    (X) * Cultures negative or infection identified and under adequate treatment    (X) * Mental status at baseline    ( ) * Metabolic derangement (eg, dehydration, acidosis) absent    ( ) * End-organ dysfunction (eg, myocardial ischemia, renal failure) absent    ( ) * Discharge plans and education understood       Activity   (X) * Ambulatory       Routes   (X) * Oral hydration, medications[J]    6/17/2019 19:09:28 EDT by Jeromy Rivers    Meds: Elavil 50mg po qhs, Norvasc 10mg po qd, lovenox 40mg SC q24hrs, Neurontin 300mg po 3x/d, methadone 130mg po qd, oxycodone 5mg po q6hrs PRN, zolft 50mg po qd       (X) Usual diet       Medications   (X) * Antimicrobial treatment not necessary or treatment at next level of care arranged[D]                * Milestone      Additional Notes   6/16/19      Acute on chronic L foot wound infection - POA, now improved post I&D by podiatry on 05/25.  Wound cx polymicrobial, with stenotrophomonas, proteus, klebsiella. He already completed a 10 day course of IV ceftriaxone and tigecycline, ended on 06/10.  Follow up wound care and Dr. Yovani Perdomo (Vascular) for saphenous vein ablation.        Physical debility / Hx of hemorrhagic CVA w/ L sided hemiparesis -  Due to prior psychological issues, chronic narcotics and homelessness, no SNF will accept him. CM working on home health services, but again no one will accept.  Awaiting final arrangements. Continue PT/OT and ambulate as tolerated. His discharge has been delayed for 6 days so far due to this disposition problem.       HTN (hypertension) - POA, stable on norvasc, lisinopril       Hypothyroid - POA, TSH elevated due to non-compliance.  Resumed synthroid.  Repeat TFTs in 3 weeks       Chronic pain / Chronic narcotic use / Anxiety and depression - continue methadone, gabapentin, miralax, sertraline, olanzepine, melatonin, prn oxycodone, but reduce to 5mg, add prn motrin and amitriptyline.       Hx of bladder CA - s/p surgery in 04/2018 at [facility] need to f/u with his Urologist. Lana Flavin flomax       Hx gout - Allopurinol and Colchisine          Sepsis and Other Febrile Illness, without Focal Infection - Care Day 17 (6/15/2019) by Wily Welsh RN         Review Status Review Entered   Completed 6/17/2019 16:18       Criteria Review      Care Day: 17 Care Date: 6/15/2019 Level of Care: Inpatient Floor   Guideline Day 4    Clinical Status   (X) * Hemodynamic stability    6/17/2019 16:18:29 EDT by Wily Welsh    VS: 128/76, 57, 98.0, 18, 94%       (X) * Fever absent or acceptable for next level of care    (X) * Hypoxemia absent    (X) * Tachypnea absent    (X) * Cultures negative or infection identified and under adequate treatment    (X) * Mental status at baseline    ( ) * Metabolic derangement (eg, dehydration, acidosis) absent    ( ) * End-organ dysfunction (eg, myocardial ischemia, renal failure) absent    ( ) * Discharge plans and education understood       Activity   (X) * Ambulatory       Routes   (X) * Oral hydration, medications[J]    6/17/2019 16:18:29 EDT by Wily Welsh    Meds: methadone 130mg po qd, oxycodone 5mg po q6hrs, Neurontin 300mg po 3x/d, lovenox 40mg SC q24hrs, oxycodone 10mg po, Neurontin 300mg po 3x/d       (X) Usual diet       Medications   (X) * Antimicrobial treatment not necessary or treatment at next level of care arranged[D] * Milestone      Additional Notes   6/15/19      Acute on chronic L foot wound infection - POA, now improved post I&D by podiatry on 05/25.  Wound cx polymicrobial, with stenotrophomonas, proteus, klebsiella. Completed a 10 day course of IV ceftriaxone and tigecycline, ended on 06/10.  Follow up wound care and Dr. Javier Cordero (Vascular) for saphenous vein ablation.        Physical debility / Hx of hemorrhagic CVA w/ L sided hemiparesis -  Due to prior psychological issues, chronic narcotics and homelessness, no SNF will accept him. CM working on home health services, but again no one will accept. Awaiting final arrangements. Continue PT/OT and ambulate as tolerated. His discharge has been delayed for 5 days so far due to this disposition problem.       HTN (hypertension) - POA, stable on norvasc, lisinopril       Hypothyroid - POA, TSH elevated due to non-compliance.  Resumed synthroid.  Repeat TFTs in 4 weeks       Chronic pain / Chronic narcotic use / Anxiety and depression - continue methadone, gabapentin, miralax, sertraline, olanzepine, melatonin, prn oxycodone, but reduce to 5mg, add prn motrin       Hx of bladder CA - s/p surgery in 04/2018 at [facility] need to f/u with his Urologist. Keila Locke flomax       Hx gout - Allopurinol and Colchisine

## 2019-06-18 NOTE — PROGRESS NOTES
Bedside and Verbal shift change report given to Pato Osuna RN (oncoming nurse) by Nasrin Mckenzie RN (offgoing nurse). Report included the following information SBAR, Kardex, Intake/Output, MAR, Recent Results and Med Rec Status.

## 2019-06-19 VITALS
OXYGEN SATURATION: 96 % | HEIGHT: 73 IN | TEMPERATURE: 98.2 F | SYSTOLIC BLOOD PRESSURE: 113 MMHG | HEART RATE: 55 BPM | DIASTOLIC BLOOD PRESSURE: 60 MMHG | RESPIRATION RATE: 17 BRPM | WEIGHT: 242.29 LBS | BODY MASS INDEX: 32.11 KG/M2

## 2019-06-19 PROCEDURE — 74011250637 HC RX REV CODE- 250/637: Performed by: INTERNAL MEDICINE

## 2019-06-19 RX ADMIN — GABAPENTIN 300 MG: 100 CAPSULE ORAL at 08:39

## 2019-06-19 RX ADMIN — TAMSULOSIN HYDROCHLORIDE 0.4 MG: 0.4 CAPSULE ORAL at 08:39

## 2019-06-19 RX ADMIN — METHADONE HYDROCHLORIDE 130 MG: 10 CONCENTRATE ORAL at 08:39

## 2019-06-19 RX ADMIN — ALLOPURINOL 100 MG: 100 TABLET ORAL at 08:39

## 2019-06-19 RX ADMIN — LEVOTHYROXINE SODIUM 100 MCG: 100 TABLET ORAL at 06:32

## 2019-06-19 RX ADMIN — AMLODIPINE BESYLATE 10 MG: 5 TABLET ORAL at 08:39

## 2019-06-19 RX ADMIN — OXYCODONE HYDROCHLORIDE 5 MG: 5 TABLET ORAL at 04:41

## 2019-06-19 RX ADMIN — ACETAMINOPHEN 650 MG: 325 TABLET ORAL at 08:46

## 2019-06-19 RX ADMIN — SERTRALINE HYDROCHLORIDE 50 MG: 50 TABLET ORAL at 08:39

## 2019-06-19 RX ADMIN — LISINOPRIL 40 MG: 20 TABLET ORAL at 08:39

## 2019-06-19 RX ADMIN — PANTOPRAZOLE SODIUM 40 MG: 40 TABLET, DELAYED RELEASE ORAL at 06:32

## 2019-06-19 RX ADMIN — AVOBENZONE, HOMOSALATE, OCTISALATE, OCTOCRYLENE, AND OXYBENZONE 1 PACKET: 29.4; 147; 49; 25.4; 58.8 LOTION TOPICAL at 08:40

## 2019-06-19 NOTE — PROGRESS NOTES
Patient given updated methadone clinic letter. Patient has discharge instructions, prescriptions and letter in hand. Patient verbalizes understanding of discharge instructions, medications and follow up and home care. Patient has no PIV presently.

## 2019-06-19 NOTE — PROGRESS NOTES
CM spoke with Pt. CM informed that caregiver Leander Vasquez will be transporting Pt at 11am today.            [facility], [address] [place] [address]

## 2019-06-19 NOTE — PROGRESS NOTES
Bedside and Verbal shift change report given to RN Lindle Sandifer (oncoming nurse) by RN jacques (offgoing nurse). Report included the following information SBAR, Kardex, Procedure Summary, Intake/Output, MAR and Quality Measures.

## 2019-06-19 NOTE — PROGRESS NOTES
36  Pt was transported down via WC to the main lobby area with personal belongings. [facility] vehicle picked up patient. Personal belongings placed in the car. Pt was stable and got himself in the car. Cane with him. CM aware.

## 2019-06-19 NOTE — PROGRESS NOTES
[facility]    Medical Progress Note      NAME: Coty Celeste   :  1965  MRM:  [id number]    Date/Time: 2019  10:31 AM          Assessment and Plan:     Acute on chronic L foot wound infection - POA, now improved post I&D by podiatry on .  Wound cx polymicrobial, with stenotrophomonas, proteus, klebsiella. He completed a 10 day course of IV ceftriaxone and tigecycline on 6/10.  Follow up wound care as outpatient Dr. Amanda Whelan (Vascular) for saphenous vein ablation. Physical debility / Hx of hemorrhagic CVA w/ L sided hemiparesis -  Due to prior psychological issues, chronic narcotics and homelessness, no SNF will accept him. CM working on home health services and have made final arrangements. Continue PT/OT and ambulate as tolerated. His discharge has been delayed for 9 days so far due to this disposition problem.     HTN (hypertension) - POA, stable on norvasc, lisinopril     Hypothyroid - POA, TSH elevated due to non-compliance. Resumed synthroid.  Repeat TFTs in 2 weeks     Chronic pain / Chronic narcotic use / Anxiety and depression - continue methadone, gabapentin, miralax, sertraline, olanzepine, melatonin, prn oxycodone, but reduce to 5mg, add prn motrin and amitriptyline. He is anxious that he will not get pain medicine at home.     Hx of bladder CA - s/p surgery in 2018 at [facility] need to f/u with his Urologist. Joel Ledbetterble flomax    Hx gout - Allopurinol and Colchisine       Subjective:     Chief Complaint: Pain is stable. Neuropathic pain. ROS:  (bold if positive, if negative)    Not Tolerating much PT  Tolerating Diet        Objective:     Last 24hrs VS reviewed since prior progress note.  Most recent are:    Visit Vitals  /60 (BP 1 Location: Right arm)   Pulse (!) 55   Temp 98.2 °F (36.8 °C)   Resp 17   Ht 6' 1\" (1.854 m)   Wt 109.9 kg (242 lb 4.6 oz)   SpO2 96%   BMI 31.97 kg/m²     SpO2 Readings from Last 6 Encounters:   19 96%   19 95% 05/20/19 99%   05/10/19 92%   10/12/18 96%   09/28/18 95%            Intake/Output Summary (Last 24 hours) at 6/19/2019 1031  Last data filed at 6/19/2019 0750  Gross per 24 hour   Intake 240 ml   Output 825 ml   Net -585 ml        Physical Exam:    Gen:  Obese, in no acute distress  HEENT:  Pink conjunctivae, PERRL, hearing intact to voice, moist mucous membranes  Neck:  Supple, without masses, thyroid non-tender  Resp:  No accessory muscle use, clear breath sounds without wheezes rales or rhonchi  Card:  No murmurs, normal S1, S2 without thrills, bruits or peripheral edema  Abd:  Soft, non-tender, non-distended, normoactive bowel sounds are present, no mass  Lymph:  No cervical or inguinal adenopathy  Musc:  No cyanosis or clubbing  Skin:  No rashes or ulcers, skin turgor is good  Neuro:  Cranial nerves are grossly intact, General motor weakness, follows commands   Psych:  Good insight, oriented to person, place and time, alert    Telemetry reviewed:   normal sinus rhythm  __________________________________________________________________  Medications Reviewed: (see below)  Medications:     Current Facility-Administered Medications   Medication Dose Route Frequency    amitriptyline (ELAVIL) tablet 50 mg  50 mg Oral QHS    ibuprofen (MOTRIN) tablet 400 mg  400 mg Oral Q6H PRN    oxyCODONE IR (ROXICODONE) tablet 5 mg  5 mg Oral Q6H PRN    psyllium husk-aspartame (METAMUCIL FIBER) packet 1 Packet  1 Packet Oral BID    gabapentin (NEURONTIN) capsule 300 mg  300 mg Oral TID    polyethylene glycol (MIRALAX) packet 17 g  17 g Oral DAILY    acetaminophen (TYLENOL) tablet 650 mg  650 mg Oral Q4H PRN    melatonin tablet 3 mg  3 mg Oral QHS PRN    methadone (DOLOPHINE) 10 mg/mL concentrated solution 130 mg  130 mg Oral DAILY    colchicine tablet 0.6 mg  0.6 mg Oral DAILY PRN    lisinopril (PRINIVIL, ZESTRIL) tablet 40 mg  40 mg Oral DAILY    amLODIPine (NORVASC) tablet 10 mg  10 mg Oral DAILY    senna-docusate (PERICOLACE) 8.6-50 mg per tablet 1 Tab  1 Tab Oral DAILY    sertraline (ZOLOFT) tablet 50 mg  50 mg Oral DAILY    allopurinol (ZYLOPRIM) tablet 100 mg  100 mg Oral DAILY    tamsulosin (FLOMAX) capsule 0.4 mg  0.4 mg Oral DAILY    nicotine (NICODERM CQ) 21 mg/24 hr patch 1 Patch  1 Patch TransDERmal DAILY PRN    OLANZapine (ZyPREXA) tablet 5 mg  5 mg Oral Q6H PRN    sodium chloride (NS) flush 5-40 mL  5-40 mL IntraVENous Q8H    sodium chloride (NS) flush 5-40 mL  5-40 mL IntraVENous PRN    diphenhydrAMINE (BENADRYL) capsule 25 mg  25 mg Oral Q4H PRN    ondansetron (ZOFRAN) injection 4 mg  4 mg IntraVENous Q4H PRN    enoxaparin (LOVENOX) injection 40 mg  40 mg SubCUTAneous Q24H    pantoprazole (PROTONIX) tablet 40 mg  40 mg Oral ACB    alum-mag hydroxide-simeth (MYLANTA) oral suspension 30 mL  30 mL Oral Q4H PRN    levothyroxine (SYNTHROID) tablet 100 mcg  100 mcg Oral ACB        Lab Data Reviewed: (see below)  Lab Review:     No results for input(s): WBC, HGB, HCT, PLT, HGBEXT, HCTEXT, PLTEXT, HGBEXT, HCTEXT, PLTEXT in the last 72 hours. No results for input(s): NA, K, CL, CO2, GLU, BUN, CREA, CA, MG, PHOS, ALB, TBIL, TBILI, SGOT, ALT, INR in the last 72 hours. No lab exists for component: INREXT, INREXT  Lab Results   Component Value Date/Time    Glucose (POC) 150 (H) 03/19/2018 11:45 AM    Glucose (POC) 123 (H) 03/18/2018 08:55 PM    Glucose (POC) 96 03/16/2018 04:58 PM    Glucose (POC) 116 (H) 12/12/2012 12:42 PM    Glucose (POC) 99 12/11/2012 11:27 PM     No results for input(s): PH, PCO2, PO2, HCO3, FIO2 in the last 72 hours. No results for input(s): INR in the last 72 hours.     No lab exists for component: [place]  All Micro Results     Procedure Component Value Units Date/Time    CULTURE, BLOOD, PAIRED [[id number]] Collected:  05/30/19 0000    Order Status:  Completed Specimen:  Blood Updated:  06/04/19 0508     Special Requests: NO SPECIAL REQUESTS        Culture result: NO GROWTH 5 DAYS       CULTURE, Elia Lord STAIN [[id number]]  (Abnormal)  (Susceptibility) Collected:  05/30/19 1500    Order Status:  Completed Specimen:  Foot Updated:  06/03/19 0959     Special Requests: NO SPECIAL REQUESTS        GRAM STAIN NO WBC'S SEEN         3+ GRAM NEGATIVE RODS               2+ GRAM POSITIVE COCCOBACILLI           Culture result: HEAVY DIPHTHEROIDS               LIGHT STENOTROPHOMONAS ([place] MALTOPHILIA CLSI GUIDELINES DO NOT RECOMMEND REPORTING SUSCEPTIBILITIES FOR S. MALTOPHILIA. TRIMETHOPRIM/SULFAMETHOXAZOLE IS THE DRUG OF CHOICE. FEW PROTEUS MIRABILIS         FEW KLEBSIELLA PNEUMONIAE       CULTURE, Elia Lord STAIN [[id number]]  (Abnormal)  (Susceptibility) Collected:  05/30/19 0141    Order Status:  Completed Specimen:  Wound Drainage Updated:  06/02/19 0920     Special Requests: NO SPECIAL REQUESTS        GRAM STAIN NO WBC'S SEEN               OCCASIONAL APPARENT GRAM NEGATIVE RODS           Culture result: LIGHT PROTEUS MIRABILIS               LIGHT STENOTROPHOMONAS ([place] MALTOPHILIA CLSI GUIDELINES DO NOT RECOMMEND REPORTING SUSCEPTIBILITIES FOR S. MALTOPHILIA. TRIMETHOPRIM/SULFAMETHOXAZOLE IS THE DRUG OF CHOICE. HEAVY MIXED SKIN YULIA ISOLATED                Other pertinent lab: none    Total time spent with patient: 30 Minutes I reviewed chart, notes, data and current medications in the medical record. I have examined and treated the patient at bedside during this period.                  Care Plan discussed with: Patient, Care Manager, Nursing Staff and >50% of time spent in counseling and coordination of care    Discussed:  Care Plan and D/C Planning    Prophylaxis:  H2B/PPI    Disposition:  SNF/LTC and  PT, OT, RN           ___________________________________________________    Attending Physician: Wilber Ramirez MD

## 2019-06-19 NOTE — PROGRESS NOTES
06/19/19 4:34 PM  Per patient, his ride Tami Ahmeek, was to be to [facility] between 11AM and 11:30AM to pick him up. Patient was discharged to [facility] around 12:15PM today, as Tami Ahmeek had not shown up. CM check in with patient in comfort lounge at 12:30PM and he noted that Tami Ahmeek was not here yet and he was still waiting for her. Patient expressed that he didn't know what was going on with her. CM checked back with patient several times and Tami Ahmeek was still not present. CM and patient both attempted phone contact with Tami Ahmeek to no avail. Patient determined that he desires to be discharged to [facility]. He noted that he desires to  him meds at [facility] on [address] [facility] and [facility]. At [facility] [employer] notified of address change, confirmed change to [facility] address with morris Price at [facility] [address] [facility] ([facility] office) and confirmed that they can still provide home health services to patient. Roundtrip scheduled for 4:30 PM .   JUVENCIO Mijares

## 2019-06-19 NOTE — PROGRESS NOTES
Patient was asleep on shift change @2230 hrs. For possible discharge tomorrow pending placement. Dressing to left foot dry and intact. Still complaining of pain 9/10. Meds not due at this time, patient is aware. Continue to monitor.

## 2019-10-14 ENCOUNTER — HOSPITAL ENCOUNTER (EMERGENCY)
Age: 54
Discharge: HOME | End: 2019-10-15
Attending: EMERGENCY MEDICINE
Payer: MEDICAID

## 2019-10-14 ENCOUNTER — APPOINTMENT (OUTPATIENT)
Dept: GENERAL RADIOLOGY | Age: 54
End: 2019-10-14
Attending: PHYSICIAN ASSISTANT
Payer: MEDICAID

## 2019-10-14 DIAGNOSIS — S81.802A OPEN WOUND OF LEFT LOWER LEG, INITIAL ENCOUNTER: ICD-10-CM

## 2019-10-14 DIAGNOSIS — L98.492 SKIN ULCER WITH FAT LAYER EXPOSED (HCC): Primary | ICD-10-CM

## 2019-10-14 LAB
BASOPHILS # BLD: 0 K/UL (ref 0–0.1)
BASOPHILS NFR BLD: 0 % (ref 0–2)
DIFFERENTIAL METHOD BLD: ABNORMAL
EOSINOPHIL # BLD: 0.5 K/UL (ref 0–0.4)
EOSINOPHIL NFR BLD: 5 % (ref 0–5)
ERYTHROCYTE [DISTWIDTH] IN BLOOD BY AUTOMATED COUNT: 16 % (ref 11.6–14.5)
HCT VFR BLD AUTO: 37.2 % (ref 36–48)
HGB BLD-MCNC: 12 G/DL (ref 13–16)
LYMPHOCYTES # BLD: 3.3 K/UL (ref 0.9–3.6)
LYMPHOCYTES NFR BLD: 32 % (ref 21–52)
MCH RBC QN AUTO: 27.7 PG (ref 24–34)
MCHC RBC AUTO-ENTMCNC: 32.3 G/DL (ref 31–37)
MCV RBC AUTO: 85.9 FL (ref 74–97)
MONOCYTES # BLD: 0.8 K/UL (ref 0.05–1.2)
MONOCYTES NFR BLD: 8 % (ref 3–10)
NEUTS SEG # BLD: 5.8 K/UL (ref 1.8–8)
NEUTS SEG NFR BLD: 55 % (ref 40–73)
PLATELET # BLD AUTO: 216 K/UL (ref 135–420)
PMV BLD AUTO: 9.8 FL (ref 9.2–11.8)
RBC # BLD AUTO: 4.33 M/UL (ref 4.7–5.5)
WBC # BLD AUTO: 10.5 K/UL (ref 4.6–13.2)

## 2019-10-14 PROCEDURE — 74011250637 HC RX REV CODE- 250/637: Performed by: PHYSICIAN ASSISTANT

## 2019-10-14 PROCEDURE — 74011000250 HC RX REV CODE- 250: Performed by: PHYSICIAN ASSISTANT

## 2019-10-14 PROCEDURE — 99284 EMERGENCY DEPT VISIT MOD MDM: CPT

## 2019-10-14 PROCEDURE — 80053 COMPREHEN METABOLIC PANEL: CPT

## 2019-10-14 PROCEDURE — 73630 X-RAY EXAM OF FOOT: CPT

## 2019-10-14 PROCEDURE — 85652 RBC SED RATE AUTOMATED: CPT

## 2019-10-14 PROCEDURE — 85025 COMPLETE CBC W/AUTO DIFF WBC: CPT

## 2019-10-14 RX ORDER — IPRATROPIUM BROMIDE AND ALBUTEROL SULFATE 2.5; .5 MG/3ML; MG/3ML
3 SOLUTION RESPIRATORY (INHALATION)
Status: COMPLETED | OUTPATIENT
Start: 2019-10-14 | End: 2019-10-14

## 2019-10-14 RX ORDER — OXYCODONE AND ACETAMINOPHEN 5; 325 MG/1; MG/1
1 TABLET ORAL ONCE
Status: COMPLETED | OUTPATIENT
Start: 2019-10-14 | End: 2019-10-14

## 2019-10-14 RX ADMIN — OXYCODONE HYDROCHLORIDE AND ACETAMINOPHEN 1 TABLET: 5; 325 TABLET ORAL at 23:33

## 2019-10-14 RX ADMIN — IPRATROPIUM BROMIDE AND ALBUTEROL SULFATE 3 ML: .5; 3 SOLUTION RESPIRATORY (INHALATION) at 23:33

## 2019-10-15 VITALS
RESPIRATION RATE: 18 BRPM | HEART RATE: 65 BPM | SYSTOLIC BLOOD PRESSURE: 124 MMHG | BODY MASS INDEX: 31.14 KG/M2 | WEIGHT: 235 LBS | HEIGHT: 73 IN | TEMPERATURE: 98.1 F | DIASTOLIC BLOOD PRESSURE: 72 MMHG | OXYGEN SATURATION: 99 %

## 2019-10-15 LAB
ALBUMIN SERPL-MCNC: 3.5 G/DL (ref 3.4–5)
ALBUMIN/GLOB SERPL: 1.2 {RATIO} (ref 0.8–1.7)
ALP SERPL-CCNC: 125 U/L (ref 45–117)
ALT SERPL-CCNC: 45 U/L (ref 16–61)
ANION GAP SERPL CALC-SCNC: 10 MMOL/L (ref 3–18)
AST SERPL-CCNC: 33 U/L (ref 10–38)
BILIRUB SERPL-MCNC: 0.3 MG/DL (ref 0.2–1)
BUN SERPL-MCNC: 5 MG/DL (ref 7–18)
BUN/CREAT SERPL: 7 (ref 12–20)
CALCIUM SERPL-MCNC: 8.1 MG/DL (ref 8.5–10.1)
CHLORIDE SERPL-SCNC: 105 MMOL/L (ref 100–111)
CO2 SERPL-SCNC: 24 MMOL/L (ref 21–32)
CREAT SERPL-MCNC: 0.74 MG/DL (ref 0.6–1.3)
ERYTHROCYTE [SEDIMENTATION RATE] IN BLOOD: 3 MM/HR (ref 0–20)
GLOBULIN SER CALC-MCNC: 2.9 G/DL (ref 2–4)
GLUCOSE SERPL-MCNC: 92 MG/DL (ref 74–99)
POTASSIUM SERPL-SCNC: 3.5 MMOL/L (ref 3.5–5.5)
PROT SERPL-MCNC: 6.4 G/DL (ref 6.4–8.2)
SODIUM SERPL-SCNC: 139 MMOL/L (ref 136–145)

## 2019-10-15 PROCEDURE — 74011250637 HC RX REV CODE- 250/637: Performed by: PHYSICIAN ASSISTANT

## 2019-10-15 RX ORDER — OXYCODONE AND ACETAMINOPHEN 5; 325 MG/1; MG/1
1 TABLET ORAL
Qty: 12 TAB | Refills: 0 | Status: SHIPPED | OUTPATIENT
Start: 2019-10-15 | End: 2019-10-18

## 2019-10-15 RX ORDER — OXYCODONE AND ACETAMINOPHEN 5; 325 MG/1; MG/1
TABLET ORAL
Status: DISCONTINUED
Start: 2019-10-15 | End: 2019-10-15 | Stop reason: HOSPADM

## 2019-10-15 RX ORDER — OXYCODONE AND ACETAMINOPHEN 5; 325 MG/1; MG/1
1 TABLET ORAL
Status: COMPLETED | OUTPATIENT
Start: 2019-10-15 | End: 2019-10-15

## 2019-10-15 RX ADMIN — OXYCODONE HYDROCHLORIDE AND ACETAMINOPHEN 1 TABLET: 5; 325 TABLET ORAL at 01:25

## 2019-10-15 NOTE — ED TRIAGE NOTES
Pt presents to Ed though triage with c/o left foot pain, pt reports history of brain aneurism that affected his left side . Pt expresses concern for infection to left foot b/c increased pain. Pt reports no odor from left foot.

## 2019-10-15 NOTE — ED PROVIDER NOTES
EMERGENCY DEPARTMENT HISTORY AND PHYSICAL EXAM    Date: 10/14/2019  Patient Name: Daniella Hoffman    History of Presenting Illness     Chief Complaint   Patient presents with    Foot Pain     left         History Provided By: Patient    Daniella Hoffman is a 46 y.o. male with PMHX of stroke, intracranial hemorrhage, chronic foot pain who presents to the emergency department C/O left foot pain. Patient states he had a heat block fall onto his left foot more than 1 year ago, has been seen and treated for foot fractures and chronic skin breakdown by multiple providers without relief. Patient states that he has been having increasing pain over the skin breakdown that he can no longer tolerate and would like to have further evaluation. Patient denies fever, chills, nausea, vomiting, chest pain, shortness of breath, states he has limited use of his left side after his stroke and so is unable to fully care for his left foot. PCP: Abhijit Foster MD    Current Facility-Administered Medications   Medication Dose Route Frequency Provider Last Rate Last Dose    oxyCODONE-acetaminophen (PERCOCET) 5-325 mg per tablet              Current Outpatient Medications   Medication Sig Dispense Refill    oxyCODONE-acetaminophen (PERCOCET) 5-325 mg per tablet Take 1 Tab by mouth every six (6) hours as needed for Pain for up to 3 days. Max Daily Amount: 4 Tabs. 12 Tab 0    ibuprofen (MOTRIN) 200 mg tablet Take 200 mg by mouth.  pantoprazole (PROTONIX) 40 mg tablet       levothyroxine (SYNTHROID) 100 mcg tablet Take 1 Tab by mouth Daily (before breakfast). Indications: hypothyroidism 30 Tab 0    tamsulosin (FLOMAX) 0.4 mg capsule Take 1 Cap by mouth daily. Indications: bladder cancer 30 Cap 0    senna-docusate (DARELL-COLACE) 8.6-50 mg per tablet Take 1 Tab by mouth daily.  lisinopril (PRINIVIL, ZESTRIL) 20 mg tablet Take 2 Tabs by mouth daily.  Indications: high blood pressure 30 Tab 0    methadone (DOLOPHINE) 10 mg/mL solution Take 130 mg by mouth daily.  cephALEXin (KEFLEX) 500 mg capsule Take 1 Cap by mouth three (3) times daily. 21 Cap 0    albuterol (VENTOLIN HFA) 90 mcg/actuation inhaler       aspirin 81 mg chewable tablet Take 81 mg by mouth.  carBAMazepine (EPITOL) 200 mg tablet       gabapentin (NEURONTIN) 400 mg capsule TAKE 1 CAPSULE BY MOUTH TWICE A DAY  0    naproxen (NAPROSYN) 500 mg tablet       onabotulinumtoxinA (BOTOX) 100 unit injection 400 Units by IntraMUSCular route.  OXcarbazepine (TRILEPTAL) 150 mg tablet Take 150 mg by mouth.  CHANTIX STARTING MONTH BOX 0.5 mg (11)- 1 mg (42) DsPk TAKE 1 TABLET BY MOUTH IN MORNING WITH FOOD FOR 3 DAYS THEN INCREASE TO 1 TABLET BY MOUTH TWICE DAILY WITH FOOD THEREAFTER AS DIRECTED ON PA  2    terbinafine HCl (LAMISIL) 250 mg tablet       tamsulosin (FLOMAX) 0.4 mg capsule Take 1 Cap by mouth daily (after dinner). 90 Cap 3    allopurinol (ZYLOPRIM) 100 mg tablet Take 1 Tab by mouth daily. Indications: treatment to prevent acute gout attack 30 Tab 0    sertraline (ZOLOFT) 50 mg tablet Take 1 Tab by mouth daily. Indications: major depressive disorder 30 Tab 0    amLODIPine (NORVASC) 10 mg tablet Take 1 Tab by mouth daily. 30 Tab 0    colchicine 0.6 mg tablet Take 0.6 mg by mouth daily as needed (gout flare).  acetaminophen (TYLENOL) 325 mg tablet Take 2 Tabs by mouth every four (4) hours as needed. Indications: Arthritic Pain 30 Tab 0    melatonin 3 mg tablet Take 1 Tab by mouth nightly as needed.  10 Tab 0       Past History     Past Medical History:  Past Medical History:   Diagnosis Date    Aneurysm ([facility] [address]     (with stroke)    Bladder tumor     Cancer ([facility] [address]     bladder CA    Chronic pain     Family history of bladder cancer     Gout     History of vascular access device 04/25/2019    [facility] VAT 4 FR MIDLINE R Cephalic Limited access    History of vascular access device 04/26/2019    4 fr Midline right Cephalic midline access    Hypercholesterolemia     Hypertension     Lesion of bladder     Seizures ([facility] [address]     Stroke [facility]) 2006    left sided weakness    Thromboembolus ([facility] [address]     Thyroid disease     TIA (transient ischemic attack) 2012       Past Surgical History:  Past Surgical History:   Procedure Laterality Date    HX ORTHOPAEDIC      R arthroscopy    HX OTHER SURGICAL      x2 L foot    HX UROLOGICAL  04/20/2018    Cystoscopy, TURBT (greater than 5 cm resected) Dr. Baudilio Gutierres, [facility].  KNEE ARTHROSCP HARV      left knee    TRANSURETHRAL RESEC BLADDER NECK  08/10/2018       Family History:  Family History   Problem Relation Age of Onset    Diabetes Father     Lung Disease Father     Diabetes Sister     Diabetes Brother     Cancer Paternal Grandfather         Bladder       Social History:  Social History     Tobacco Use    Smoking status: Current Every Day Smoker     Packs/day: 0.25    Smokeless tobacco: Never Used    Tobacco comment: Pt prescribed chantix    Substance Use Topics    Alcohol use: Yes     Alcohol/week: 14.0 standard drinks     Types: 14 Cans of beer per week     Comment: occasional    Drug use: Yes     Types: Prescription       Allergies: Allergies   Allergen Reactions    Sulfamethoxazole-Trimethoprim Rash     L eye and L hand    Ciprofloxacin Hives     Per pcp records    Codeine Hives     Tolerates dilaudid, oxycodone    Cymbalta [Duloxetine] Other (comments)     Confusion and memory loss    Gabapentin Hives and Diarrhea     No longer allergic to it.  Lyrica [Pregabalin] Hives    Sulfa (Sulfonamide Antibiotics) Rash    Ultram [Tramadol] Hives    Vancomycin Shortness of Breath         Review of Systems   Review of Systems   Constitutional: Negative for chills and fever. Respiratory: Negative for shortness of breath and wheezing. Cardiovascular: Negative for chest pain. Gastrointestinal: Negative for nausea and vomiting. Musculoskeletal: Positive for arthralgias.    Skin: Positive for wound.   Neurological: Negative for dizziness, weakness and numbness. All other systems reviewed and are negative. Physical Exam     Vitals:    10/14/19 2233 10/15/19 0031 10/15/19 0130   BP: 132/82 105/67 124/72   Pulse: 63 65 65   Resp: 16 18 18   Temp: 98.1 °F (36.7 °C)     SpO2: 97% 97% 99%   Weight: 106.6 kg (235 lb)     Height: 6' 1\" (1.854 m)       Physical Exam   Constitutional: He is oriented to person, place, and time. He appears well-developed and well-nourished. HENT:   Head: Normocephalic and atraumatic. Eyes: Pupils are equal, round, and reactive to light. Conjunctivae are normal.   Neck: Normal range of motion. Neck supple. Cardiovascular: Normal rate, regular rhythm, normal heart sounds and intact distal pulses. Pulmonary/Chest: Effort normal. He has wheezes (Throughout all lung fields). Abdominal: Soft. Bowel sounds are normal.   Musculoskeletal: Normal range of motion. Feet:    Neurological: He is alert and oriented to person, place, and time. Nursing note and vitals reviewed. Diagnostic Study Results     Labs -     Recent Results (from the past 12 hour(s))   CBC WITH AUTOMATED DIFF    Collection Time: 10/14/19 11:40 PM   Result Value Ref Range    WBC 10.5 4.6 - 13.2 K/uL    RBC 4.33 (L) 4.70 - 5.50 M/uL    HGB 12.0 (L) 13.0 - 16.0 g/dL    HCT 37.2 36.0 - 48.0 %    MCV 85.9 74.0 - 97.0 FL    MCH 27.7 24.0 - 34.0 PG    MCHC 32.3 31.0 - 37.0 g/dL    RDW 16.0 (H) 11.6 - 14.5 %    PLATELET 037 040 - 231 K/uL    MPV 9.8 9.2 - 11.8 FL    NEUTROPHILS 55 40 - 73 %    LYMPHOCYTES 32 21 - 52 %    MONOCYTES 8 3 - 10 %    EOSINOPHILS 5 0 - 5 %    BASOPHILS 0 0 - 2 %    ABS. NEUTROPHILS 5.8 1.8 - 8.0 K/UL    ABS. LYMPHOCYTES 3.3 0.9 - 3.6 K/UL    ABS. MONOCYTES 0.8 0.05 - 1.2 K/UL    ABS. EOSINOPHILS 0.5 (H) 0.0 - 0.4 K/UL    ABS.  BASOPHILS 0.0 0.0 - 0.1 K/UL    DF AUTOMATED     METABOLIC PANEL, COMPREHENSIVE    Collection Time: 10/14/19 11:40 PM   Result Value Ref Range Sodium 139 136 - 145 mmol/L    Potassium 3.5 3.5 - 5.5 mmol/L    Chloride 105 100 - 111 mmol/L    CO2 24 21 - 32 mmol/L    Anion gap 10 3.0 - 18 mmol/L    Glucose 92 74 - 99 mg/dL    BUN 5 (L) 7.0 - 18 MG/DL    Creatinine 0.74 0.6 - 1.3 MG/DL    BUN/Creatinine ratio 7 (L) 12 - 20      GFR est AA >60 >60 ml/min/1.73m2    GFR est non-AA >60 >60 ml/min/1.73m2    Calcium 8.1 (L) 8.5 - 10.1 MG/DL    Bilirubin, total 0.3 0.2 - 1.0 MG/DL    ALT (SGPT) 45 16 - 61 U/L    AST (SGOT) 33 10 - 38 U/L    Alk. phosphatase 125 (H) 45 - 117 U/L    Protein, total 6.4 6.4 - 8.2 g/dL    Albumin 3.5 3.4 - 5.0 g/dL    Globulin 2.9 2.0 - 4.0 g/dL    A-G Ratio 1.2 0.8 - 1.7     SED RATE (ESR)    Collection Time: 10/14/19 11:40 PM   Result Value Ref Range    Sed rate, automated 3 0 - 20 mm/hr       Radiologic Studies -  Wet read: no bony involvement, no acute fracture  XR FOOT LT MIN 3 V    (Results Pending)     CT Results  (Last 48 hours)    None        CXR Results  (Last 48 hours)    None          Medications given in the ED-  Medications   oxyCODONE-acetaminophen (PERCOCET) 5-325 mg per tablet (has no administration in time range)   oxyCODONE-acetaminophen (PERCOCET) 5-325 mg per tablet 1 Tab (1 Tab Oral Given 10/14/19 2333)   albuterol-ipratropium (DUO-NEB) 2.5 MG-0.5 MG/3 ML (3 mL Nebulization Given 10/14/19 2333)   oxyCODONE-acetaminophen (PERCOCET) 5-325 mg per tablet 1 Tab (1 Tab Oral Given 10/15/19 0125)         Medical Decision Making   I am the first provider for this patient. I reviewed the vital signs, available nursing notes, past medical history, past surgical history, family history and social history. Vital Signs-Reviewed the patient's vital signs. Pulse Oximetry Analysis - 97% on RA     Cardiac Monitor:  Rate: 63 bpm  Rhythm: NSR    Records Reviewed: Old Medical Records    Procedures:  Procedures    Provider notes/MDM:  DDX: No evidence of osteomyelitis on x-ray, white count, ESR within normal limits.   Patient with chronic painful ulcers causing limitations of daily activities, has follow-up appointment with Dr. Cristy Chen in orthopedics on Wednesday. With normal imaging and labs, no need for admission at this time. ED Course:   10:55 PM  Initial assessment performed. The patients presenting problems have been discussed, and they are in agreement with the care plan formulated and outlined with them. I have encouraged them to ask questions as they arise throughout their visit. 11:56 PM  Reevaluated pt, states pain is mildly improved. 12:54 PM  Discussed with Dr. Orquidea Kramer, orthopedics, as pt follows with this group, no need for admission at this time - follow-up tomorrow with Dr. Cristy Chen. 12:57 PM   Discussed with pt plan, recommend follow-up with Dr. Cristy Chen tomorrow to discuss concerns with pain, will discharge with pain medication, pt verbalized understanding. Diagnosis and Disposition       Discussion: 46 y.o. male with chronic left foot skin ulcers and pain for the past year, not improving despite multiple previous visits to providers. Discussed with Dr. Yandel Johnson who did not recommend admission at this time. Patient will follow-up with orthopedics tomorrow. Patient verbalized understanding and agreed with plan. Return precautions discussed. DISCHARGE NOTE:  Hannah Malagon  results have been reviewed with him. He has been counseled regarding his diagnosis, treatment, and plan. He verbally conveys understanding and agreement of the signs, symptoms, diagnosis, treatment and prognosis and additionally agrees to follow up as discussed. He also agrees with the care-plan and conveys that all of his questions have been answered. I have also provided discharge instructions for him that include: educational information regarding their diagnosis and treatment, and list of reasons why they would want to return to the ED prior to their follow-up appointment, should his condition change.  He has been provided with education for proper emergency department utilization. CLINICAL IMPRESSION:    1. Skin ulcer with fat layer exposed ([address]    2. Open wound of left lower leg, initial encounter        PLAN:  1. D/C Home  2. Discharge Medication List as of 10/15/2019  1:15 AM      START taking these medications    Details   oxyCODONE-acetaminophen (PERCOCET) 5-325 mg per tablet Take 1 Tab by mouth every six (6) hours as needed for Pain for up to 3 days. Max Daily Amount: 4 Tabs., Print, Disp-12 Tab, R-0         CONTINUE these medications which have NOT CHANGED    Details   ibuprofen (MOTRIN) 200 mg tablet Take 200 mg by mouth., Historical Med      pantoprazole (PROTONIX) 40 mg tablet Historical Med      levothyroxine (SYNTHROID) 100 mcg tablet Take 1 Tab by mouth Daily (before breakfast). Indications: hypothyroidism, Print, Disp-30 Tab, R-0      !! tamsulosin (FLOMAX) 0.4 mg capsule Take 1 Cap by mouth daily. Indications: bladder cancer, Print, Disp-30 Cap, R-0      senna-docusate (DARELL-COLACE) 8.6-50 mg per tablet Take 1 Tab by mouth daily. , Historical Med      lisinopril (PRINIVIL, ZESTRIL) 20 mg tablet Take 2 Tabs by mouth daily. Indications: high blood pressure, Print, Disp-30 Tab, R-0      methadone (DOLOPHINE) 10 mg/mL solution Take 130 mg by mouth daily. , Historical Med      cephALEXin (KEFLEX) 500 mg capsule Take 1 Cap by mouth three (3) times daily. , Normal, Disp-21 Cap, R-0      albuterol (VENTOLIN HFA) 90 mcg/actuation inhaler Historical Med      aspirin 81 mg chewable tablet Take 81 mg by mouth., Historical Med      carBAMazepine (EPITOL) 200 mg tablet Historical Med      gabapentin (NEURONTIN) 400 mg capsule TAKE 1 CAPSULE BY MOUTH TWICE A DAY, Historical Med, R-0      naproxen (NAPROSYN) 500 mg tablet Historical Med      onabotulinumtoxinA (BOTOX) 100 unit injection 400 Units by IntraMUSCular route., Historical Med      OXcarbazepine (TRILEPTAL) 150 mg tablet Take 150 mg by mouth., Historical Med CHANTIX STARTING MONTH BOX 0.5 mg (11)- 1 mg (42) DsPk TAKE 1 TABLET BY MOUTH IN MORNING WITH FOOD FOR 3 DAYS THEN INCREASE TO 1 TABLET BY MOUTH TWICE DAILY WITH FOOD THEREAFTER AS DIRECTED ON PA, Historical Med, R-2, FELECIA      terbinafine HCl (LAMISIL) 250 mg tablet Historical Med      !! tamsulosin (FLOMAX) 0.4 mg capsule Take 1 Cap by mouth daily (after dinner). , Print, Disp-90 Cap, R-3      allopurinol (ZYLOPRIM) 100 mg tablet Take 1 Tab by mouth daily. Indications: treatment to prevent acute gout attack, Print, Disp-30 Tab, R-0      sertraline (ZOLOFT) 50 mg tablet Take 1 Tab by mouth daily. Indications: major depressive disorder, Print, Disp-30 Tab, R-0      amLODIPine (NORVASC) 10 mg tablet Take 1 Tab by mouth daily. , Print, Disp-30 Tab, R-0      colchicine 0.6 mg tablet Take 0.6 mg by mouth daily as needed (gout flare). , Historical Med      acetaminophen (TYLENOL) 325 mg tablet Take 2 Tabs by mouth every four (4) hours as needed. Indications: Arthritic Pain, No Print, Disp-30 Tab, R-0      melatonin 3 mg tablet Take 1 Tab by mouth nightly as needed., No Print, Disp-10 Tab, R-0       !! - Potential duplicate medications found. Please discuss with provider. 3.   Follow-up Information     Follow up With Specialties Details Why Contact Info    Debbie Velasco MD Podiatry Schedule an appointment as soon as possible for a visit in 1 day  [address] [address] EMERGENCY DEPT Emergency Medicine Go to  As needed, If symptoms worsen [address] [id number] [phone]          Note completed by Jamey Arizmendi PA-C        Please note that this dictation was completed with [employer], the [employer] voice recognition software. Quite often unanticipated grammatical, syntax, homophones, and other interpretive errors are inadvertently transcribed by the computer software. Please disregard these errors.   Please excuse any errors that have escaped final proofreading.

## 2019-10-15 NOTE — ED NOTES
l foot dressed with bacitracin to wound on l dorsal foot and l medial foot, then vaseline gauze over wounds and entire foot wrapped with kerlix and post op shoe that pt arrived with reapplied. Medicated for pain at d/c. Given prescription with dose/administration and side effects explained verbally. also given verbal and written d/c instructions.  Pt to waiting area by w/c to wait cab home

## 2020-03-05 NOTE — PROGRESS NOTES
Bedside and Verbal shift change report given to Jewels Laureano RN (oncoming nurse) by Prasad Durand RN (offgoing nurse). Report included the following information SBAR and Kardex. Satisfactory

## 2020-05-07 NOTE — BH NOTES
0605 paged dr. Pricila Santacruz for [facility] pt will be added to list to be seen. Weak peripheral pulses

## 2020-05-10 NOTE — ROUTINE PROCESS
Bedside and Verbal shift change report given to Rosa Hebert RN (oncoming nurse) by Atiya More RN (offgoing nurse). Report included the following information SBAR, Kardex, MAR, Accordion and Recent Results. fever cough

## 2020-10-20 PROCEDURE — 96365 THER/PROPH/DIAG IV INF INIT: CPT

## 2020-10-20 PROCEDURE — 99284 EMERGENCY DEPT VISIT MOD MDM: CPT

## 2020-10-21 ENCOUNTER — APPOINTMENT (OUTPATIENT)
Dept: GENERAL RADIOLOGY | Age: 55
DRG: 383 | End: 2020-10-21
Attending: EMERGENCY MEDICINE
Payer: COMMERCIAL

## 2020-10-21 ENCOUNTER — APPOINTMENT (OUTPATIENT)
Dept: GENERAL RADIOLOGY | Age: 55
DRG: 383 | End: 2020-10-21
Attending: INTERNAL MEDICINE
Payer: COMMERCIAL

## 2020-10-21 ENCOUNTER — HOSPITAL ENCOUNTER (OUTPATIENT)
Dept: WOUND CARE | Age: 55
Discharge: HOME | End: 2020-10-21

## 2020-10-21 ENCOUNTER — APPOINTMENT (OUTPATIENT)
Dept: VASCULAR SURGERY | Age: 55
DRG: 383 | End: 2020-10-21
Attending: INTERNAL MEDICINE
Payer: COMMERCIAL

## 2020-10-21 ENCOUNTER — HOSPITAL ENCOUNTER (INPATIENT)
Age: 55
LOS: 10 days | Discharge: HOME | DRG: 383 | End: 2020-10-31
Attending: EMERGENCY MEDICINE | Admitting: INTERNAL MEDICINE
Payer: COMMERCIAL

## 2020-10-21 DIAGNOSIS — L08.9 LEFT FOOT INFECTION: ICD-10-CM

## 2020-10-21 DIAGNOSIS — Z79.891 LONG TERM CURRENT USE OF METHADONE FOR PAIN CONTROL: ICD-10-CM

## 2020-10-21 DIAGNOSIS — L97.528 ULCER OF LEFT FOOT WITH OTHER SEVERITY (HCC): Primary | ICD-10-CM

## 2020-10-21 DIAGNOSIS — D72.829 LEUKOCYTOSIS, UNSPECIFIED TYPE: ICD-10-CM

## 2020-10-21 DIAGNOSIS — S81.802D OPEN WOUND OF LEFT LOWER LEG, SUBSEQUENT ENCOUNTER: ICD-10-CM

## 2020-10-21 DIAGNOSIS — L97.529 ULCER OF LEFT FOOT, UNSPECIFIED ULCER STAGE (HCC): ICD-10-CM

## 2020-10-21 DIAGNOSIS — S81.802A OPEN WOUND OF LEFT LOWER LEG, INITIAL ENCOUNTER: ICD-10-CM

## 2020-10-21 LAB
ALBUMIN SERPL-MCNC: 3.7 G/DL (ref 3.5–5)
ALBUMIN/GLOB SERPL: 1 {RATIO} (ref 1.1–2.2)
ALP SERPL-CCNC: 128 U/L (ref 45–117)
ALT SERPL-CCNC: 25 U/L (ref 12–78)
ANION GAP SERPL CALC-SCNC: 6 MMOL/L (ref 5–15)
AST SERPL-CCNC: 17 U/L (ref 15–37)
BASOPHILS # BLD: 0.1 K/UL (ref 0–0.1)
BASOPHILS NFR BLD: 1 % (ref 0–1)
BILIRUB SERPL-MCNC: 0.2 MG/DL (ref 0.2–1)
BUN SERPL-MCNC: 12 MG/DL (ref 6–20)
BUN/CREAT SERPL: 17 (ref 12–20)
CALCIUM SERPL-MCNC: 8.5 MG/DL (ref 8.5–10.1)
CHLORIDE SERPL-SCNC: 103 MMOL/L (ref 97–108)
CO2 SERPL-SCNC: 26 MMOL/L (ref 21–32)
COMMENT, HOLDF: NORMAL
CREAT SERPL-MCNC: 0.72 MG/DL (ref 0.7–1.3)
CRP SERPL-MCNC: 0.64 MG/DL (ref 0–0.6)
DIFFERENTIAL METHOD BLD: ABNORMAL
EOSINOPHIL # BLD: 0.2 K/UL (ref 0–0.4)
EOSINOPHIL NFR BLD: 1 % (ref 0–7)
ERYTHROCYTE [DISTWIDTH] IN BLOOD BY AUTOMATED COUNT: 15.9 % (ref 11.5–14.5)
GLOBULIN SER CALC-MCNC: 3.7 G/DL (ref 2–4)
GLUCOSE SERPL-MCNC: 95 MG/DL (ref 65–100)
HCT VFR BLD AUTO: 33.3 % (ref 36.6–50.3)
HGB BLD-MCNC: 10.3 G/DL (ref 12.1–17)
IMM GRANULOCYTES # BLD AUTO: 0.2 K/UL (ref 0–0.04)
IMM GRANULOCYTES NFR BLD AUTO: 1 % (ref 0–0.5)
LACTATE SERPL-SCNC: 1.5 MMOL/L (ref 0.4–2)
LYMPHOCYTES # BLD: 4 K/UL (ref 0.8–3.5)
LYMPHOCYTES NFR BLD: 22 % (ref 12–49)
MCH RBC QN AUTO: 25.9 PG (ref 26–34)
MCHC RBC AUTO-ENTMCNC: 30.9 G/DL (ref 30–36.5)
MCV RBC AUTO: 83.7 FL (ref 80–99)
MONOCYTES # BLD: 1.5 K/UL (ref 0–1)
MONOCYTES NFR BLD: 9 % (ref 5–13)
NEUTS SEG # BLD: 12.1 K/UL (ref 1.8–8)
NEUTS SEG NFR BLD: 66 % (ref 32–75)
NRBC # BLD: 0 K/UL (ref 0–0.01)
NRBC BLD-RTO: 0 PER 100 WBC
PLATELET # BLD AUTO: 480 K/UL (ref 150–400)
PMV BLD AUTO: 9.4 FL (ref 8.9–12.9)
POTASSIUM SERPL-SCNC: 3.9 MMOL/L (ref 3.5–5.1)
PROT SERPL-MCNC: 7.4 G/DL (ref 6.4–8.2)
RBC # BLD AUTO: 3.98 M/UL (ref 4.1–5.7)
SAMPLES BEING HELD,HOLD: NORMAL
SODIUM SERPL-SCNC: 135 MMOL/L (ref 136–145)
WBC # BLD AUTO: 18 K/UL (ref 4.1–11.1)

## 2020-10-21 PROCEDURE — 73620 X-RAY EXAM OF FOOT: CPT

## 2020-10-21 PROCEDURE — 74011000258 HC RX REV CODE- 258: Performed by: EMERGENCY MEDICINE

## 2020-10-21 PROCEDURE — 73590 X-RAY EXAM OF LOWER LEG: CPT

## 2020-10-21 PROCEDURE — 74011250636 HC RX REV CODE- 250/636: Performed by: INTERNAL MEDICINE

## 2020-10-21 PROCEDURE — 74011250637 HC RX REV CODE- 250/637: Performed by: INTERNAL MEDICINE

## 2020-10-21 PROCEDURE — 36415 COLL VENOUS BLD VENIPUNCTURE: CPT

## 2020-10-21 PROCEDURE — 99222 1ST HOSP IP/OBS MODERATE 55: CPT | Performed by: INTERNAL MEDICINE

## 2020-10-21 PROCEDURE — 87040 BLOOD CULTURE FOR BACTERIA: CPT

## 2020-10-21 PROCEDURE — 99218 HC RM OBSERVATION: CPT

## 2020-10-21 PROCEDURE — 77030012930 HC DRSG ANTIMIC COLO -B

## 2020-10-21 PROCEDURE — 85025 COMPLETE CBC W/AUTO DIFF WBC: CPT

## 2020-10-21 PROCEDURE — 86140 C-REACTIVE PROTEIN: CPT

## 2020-10-21 PROCEDURE — 80053 COMPREHEN METABOLIC PANEL: CPT

## 2020-10-21 PROCEDURE — 2709999900 HC NON-CHARGEABLE SUPPLY

## 2020-10-21 PROCEDURE — 71045 X-RAY EXAM CHEST 1 VIEW: CPT

## 2020-10-21 PROCEDURE — 77030018836 HC SOL IRR NACL ICUM -A

## 2020-10-21 PROCEDURE — 65270000029 HC RM PRIVATE

## 2020-10-21 PROCEDURE — 83605 ASSAY OF LACTIC ACID: CPT

## 2020-10-21 PROCEDURE — 74011250636 HC RX REV CODE- 250/636: Performed by: EMERGENCY MEDICINE

## 2020-10-21 RX ORDER — NAPROXEN SODIUM 220 MG
220 TABLET ORAL
COMMUNITY
End: 2021-06-25

## 2020-10-21 RX ORDER — FAMOTIDINE 20 MG/1
20 TABLET, FILM COATED ORAL 2 TIMES DAILY
Status: DISCONTINUED | OUTPATIENT
Start: 2020-10-21 | End: 2020-10-31 | Stop reason: HOSPADM

## 2020-10-21 RX ORDER — GABAPENTIN 100 MG/1
100 CAPSULE ORAL 3 TIMES DAILY
Status: DISCONTINUED | OUTPATIENT
Start: 2020-10-21 | End: 2020-10-31 | Stop reason: HOSPADM

## 2020-10-21 RX ORDER — METHADONE HYDROCHLORIDE 10 MG/1
140 TABLET ORAL DAILY
Status: DISCONTINUED | OUTPATIENT
Start: 2020-10-21 | End: 2020-10-31 | Stop reason: HOSPADM

## 2020-10-21 RX ORDER — NYSTATIN 100000 [USP'U]/G
POWDER TOPICAL 2 TIMES DAILY
Status: DISCONTINUED | OUTPATIENT
Start: 2020-10-21 | End: 2020-10-31 | Stop reason: HOSPADM

## 2020-10-21 RX ORDER — PANTOPRAZOLE SODIUM 40 MG/1
40 TABLET, DELAYED RELEASE ORAL
Status: DISCONTINUED | OUTPATIENT
Start: 2020-10-21 | End: 2020-10-31 | Stop reason: HOSPADM

## 2020-10-21 RX ORDER — SODIUM CHLORIDE 0.9 % (FLUSH) 0.9 %
5-40 SYRINGE (ML) INJECTION AS NEEDED
Status: DISCONTINUED | OUTPATIENT
Start: 2020-10-21 | End: 2020-10-31 | Stop reason: HOSPADM

## 2020-10-21 RX ORDER — AMOXICILLIN 250 MG
1 CAPSULE ORAL DAILY
Status: DISCONTINUED | OUTPATIENT
Start: 2020-10-21 | End: 2020-10-24

## 2020-10-21 RX ORDER — SERTRALINE HYDROCHLORIDE 50 MG/1
50 TABLET, FILM COATED ORAL DAILY
Status: DISCONTINUED | OUTPATIENT
Start: 2020-10-21 | End: 2020-10-21

## 2020-10-21 RX ORDER — SODIUM CHLORIDE 0.9 % (FLUSH) 0.9 %
5-40 SYRINGE (ML) INJECTION EVERY 8 HOURS
Status: DISCONTINUED | OUTPATIENT
Start: 2020-10-21 | End: 2020-10-25

## 2020-10-21 RX ORDER — LEVOTHYROXINE SODIUM 112 UG/1
112 TABLET ORAL
COMMUNITY
End: 2021-06-25

## 2020-10-21 RX ORDER — HYDROMORPHONE HYDROCHLORIDE 2 MG/ML
1 INJECTION, SOLUTION INTRAMUSCULAR; INTRAVENOUS; SUBCUTANEOUS
Status: DISCONTINUED | OUTPATIENT
Start: 2020-10-21 | End: 2020-10-21

## 2020-10-21 RX ORDER — ENOXAPARIN SODIUM 100 MG/ML
40 INJECTION SUBCUTANEOUS DAILY
Status: DISCONTINUED | OUTPATIENT
Start: 2020-10-21 | End: 2020-10-31 | Stop reason: HOSPADM

## 2020-10-21 RX ORDER — ONDANSETRON 2 MG/ML
4 INJECTION INTRAMUSCULAR; INTRAVENOUS
Status: DISCONTINUED | OUTPATIENT
Start: 2020-10-21 | End: 2020-10-31 | Stop reason: HOSPADM

## 2020-10-21 RX ORDER — LISINOPRIL 20 MG/1
20 TABLET ORAL DAILY
Status: DISCONTINUED | OUTPATIENT
Start: 2020-10-21 | End: 2020-10-31 | Stop reason: HOSPADM

## 2020-10-21 RX ORDER — HYDROMORPHONE HYDROCHLORIDE 2 MG/1
2 TABLET ORAL
Status: DISCONTINUED | OUTPATIENT
Start: 2020-10-21 | End: 2020-10-21

## 2020-10-21 RX ORDER — ONDANSETRON 4 MG/1
4 TABLET, ORALLY DISINTEGRATING ORAL
Status: DISCONTINUED | OUTPATIENT
Start: 2020-10-21 | End: 2020-10-31 | Stop reason: HOSPADM

## 2020-10-21 RX ORDER — ACETAMINOPHEN 325 MG/1
650 TABLET ORAL
Status: DISCONTINUED | OUTPATIENT
Start: 2020-10-21 | End: 2020-10-31 | Stop reason: HOSPADM

## 2020-10-21 RX ORDER — ALLOPURINOL 100 MG/1
100 TABLET ORAL DAILY
Status: DISCONTINUED | OUTPATIENT
Start: 2020-10-21 | End: 2020-10-31 | Stop reason: HOSPADM

## 2020-10-21 RX ORDER — IBUPROFEN 200 MG
1 TABLET ORAL DAILY
Status: DISCONTINUED | OUTPATIENT
Start: 2020-10-21 | End: 2020-10-31 | Stop reason: HOSPADM

## 2020-10-21 RX ORDER — TAMSULOSIN HYDROCHLORIDE 0.4 MG/1
0.4 CAPSULE ORAL DAILY
Status: DISCONTINUED | OUTPATIENT
Start: 2020-10-21 | End: 2020-10-31 | Stop reason: HOSPADM

## 2020-10-21 RX ORDER — LEVOTHYROXINE SODIUM 112 UG/1
112 TABLET ORAL
Status: DISCONTINUED | OUTPATIENT
Start: 2020-10-22 | End: 2020-10-31 | Stop reason: HOSPADM

## 2020-10-21 RX ORDER — LISINOPRIL 20 MG/1
20 TABLET ORAL DAILY
COMMUNITY
End: 2022-10-16

## 2020-10-21 RX ORDER — LIDOCAINE HYDROCHLORIDE 20 MG/ML
JELLY TOPICAL AS NEEDED
Status: DISCONTINUED | OUTPATIENT
Start: 2020-10-21 | End: 2020-10-31 | Stop reason: HOSPADM

## 2020-10-21 RX ORDER — POLYETHYLENE GLYCOL 3350 17 G/17G
17 POWDER, FOR SOLUTION ORAL DAILY PRN
Status: DISCONTINUED | OUTPATIENT
Start: 2020-10-21 | End: 2020-10-31 | Stop reason: HOSPADM

## 2020-10-21 RX ORDER — GUAIFENESIN 100 MG/5ML
81 LIQUID (ML) ORAL DAILY
Status: DISCONTINUED | OUTPATIENT
Start: 2020-10-22 | End: 2020-10-31 | Stop reason: HOSPADM

## 2020-10-21 RX ORDER — HYDROMORPHONE HYDROCHLORIDE 2 MG/ML
1.5 INJECTION, SOLUTION INTRAMUSCULAR; INTRAVENOUS; SUBCUTANEOUS
Status: DISCONTINUED | OUTPATIENT
Start: 2020-10-21 | End: 2020-10-22

## 2020-10-21 RX ORDER — LEVOTHYROXINE SODIUM 100 UG/1
100 TABLET ORAL
Status: DISCONTINUED | OUTPATIENT
Start: 2020-10-21 | End: 2020-10-21

## 2020-10-21 RX ORDER — COLCHICINE 0.6 MG/1
0.6 TABLET ORAL
Status: DISCONTINUED | OUTPATIENT
Start: 2020-10-21 | End: 2020-10-31 | Stop reason: HOSPADM

## 2020-10-21 RX ORDER — ACETAMINOPHEN 650 MG/1
650 SUPPOSITORY RECTAL
Status: DISCONTINUED | OUTPATIENT
Start: 2020-10-21 | End: 2020-10-31 | Stop reason: HOSPADM

## 2020-10-21 RX ORDER — GABAPENTIN 100 MG/1
200 CAPSULE ORAL 3 TIMES DAILY
Status: ON HOLD | COMMUNITY

## 2020-10-21 RX ADMIN — LISINOPRIL 20 MG: 20 TABLET ORAL at 08:39

## 2020-10-21 RX ADMIN — ALLOPURINOL 100 MG: 100 TABLET ORAL at 08:39

## 2020-10-21 RX ADMIN — SERTRALINE HYDROCHLORIDE 50 MG: 50 TABLET ORAL at 08:39

## 2020-10-21 RX ADMIN — HYDROMORPHONE HYDROCHLORIDE 2 MG: 2 TABLET ORAL at 12:44

## 2020-10-21 RX ADMIN — Medication 10 ML: at 05:10

## 2020-10-21 RX ADMIN — HYDROMORPHONE HYDROCHLORIDE 2 MG: 2 TABLET ORAL at 17:18

## 2020-10-21 RX ADMIN — GABAPENTIN 100 MG: 100 CAPSULE ORAL at 21:09

## 2020-10-21 RX ADMIN — METHADONE HYDROCHLORIDE 140 MG: 10 TABLET ORAL at 08:40

## 2020-10-21 RX ADMIN — LEVOTHYROXINE SODIUM 100 MCG: 0.1 TABLET ORAL at 07:26

## 2020-10-21 RX ADMIN — Medication 10 ML: at 21:30

## 2020-10-21 RX ADMIN — COLCHICINE 0.6 MG: 0.6 TABLET, FILM COATED ORAL at 23:38

## 2020-10-21 RX ADMIN — ENOXAPARIN SODIUM 40 MG: 40 INJECTION SUBCUTANEOUS at 08:41

## 2020-10-21 RX ADMIN — FAMOTIDINE 20 MG: 20 TABLET, FILM COATED ORAL at 08:39

## 2020-10-21 RX ADMIN — TAMSULOSIN HYDROCHLORIDE 0.4 MG: 0.4 CAPSULE ORAL at 08:39

## 2020-10-21 RX ADMIN — HYDROMORPHONE HYDROCHLORIDE 1 MG: 2 INJECTION, SOLUTION INTRAMUSCULAR; INTRAVENOUS; SUBCUTANEOUS at 14:52

## 2020-10-21 RX ADMIN — HYDROMORPHONE HYDROCHLORIDE 2 MG: 2 TABLET ORAL at 08:40

## 2020-10-21 RX ADMIN — PANTOPRAZOLE SODIUM 40 MG: 40 TABLET, DELAYED RELEASE ORAL at 07:26

## 2020-10-21 RX ADMIN — GABAPENTIN 400 MG: 300 CAPSULE ORAL at 08:39

## 2020-10-21 RX ADMIN — DOCUSATE SODIUM 50MG AND SENNOSIDES 8.6MG 1 TABLET: 8.6; 5 TABLET, FILM COATED ORAL at 08:39

## 2020-10-21 RX ADMIN — PIPERACILLIN AND TAZOBACTAM 3.38 G: 3; .375 INJECTION, POWDER, LYOPHILIZED, FOR SOLUTION INTRAVENOUS at 02:48

## 2020-10-21 RX ADMIN — HYDROMORPHONE HYDROCHLORIDE 2 MG: 2 TABLET ORAL at 05:09

## 2020-10-21 RX ADMIN — HYDROMORPHONE HYDROCHLORIDE 1.5 MG: 2 INJECTION, SOLUTION INTRAMUSCULAR; INTRAVENOUS; SUBCUTANEOUS at 21:09

## 2020-10-21 RX ADMIN — Medication 10 ML: at 14:53

## 2020-10-21 NOTE — ED NOTES
TRANSFER - OUT REPORT:    Verbal report given to Judith Ashton RN(name) on Lord Galeazzi  being transferred to 5th Floor (unit) for routine progression of care       Report consisted of patients Situation, Background, Assessment and   Recommendations(SBAR). Information from the following report(s) SBAR, Kardex, ED Summary, MAR, Accordion and Recent Results was reviewed with the receiving nurse. Lines:   Peripheral IV 10/21/20 Right Antecubital (Active)   Site Assessment Clean, dry, & intact 10/21/20 0010   Phlebitis Assessment 0 10/21/20 0010   Infiltration Assessment 0 10/21/20 0010   Dressing Status Clean, dry, & intact 10/21/20 0010   Dressing Type Tape;Transparent 10/21/20 0010   Hub Color/Line Status Pink;Flushed;Patent 10/21/20 0010   Action Taken Blood drawn 10/21/20 0010        Opportunity for questions and clarification was provided.       Patient transported with:   Registered Nurse

## 2020-10-21 NOTE — PROGRESS NOTES
Attempted to do EUGENE, patient refused exam. Patient called with father in regards to MRI results.  Patient scheduled with Dr. Latif for the left shoulder for 8.10.2020, and stated that will just wait till appointment for the results of the MRI. Close encounter

## 2020-10-21 NOTE — PROGRESS NOTES
Bedside and Verbal shift change report given to [facility], RN (oncoming nurse) by Nancy Ward RN (offgoing nurse). Report included the following information SBAR, Kardex, MAR and Accordion.

## 2020-10-21 NOTE — PROGRESS NOTES
Patient known to practice from multiple previous interactions  He has had multiple opinions which all recommend BKA as do I  Discussed with patient who will take it under advisement.     If he continues to defer this decision he is at definite risk of requiring AKA

## 2020-10-21 NOTE — H&P
[facility]    Hospitalist Admission Note      NAME:  Heydi Bright   :   1965   MRN:  [id number]     PCP:  None     Date/Time of service:  10/21/2020 3:06 AM          Subjective:     CHIEF COMPLAINT: wants second opinion     HISTORY OF PRESENT ILLNESS:     Mr. Tamie Stratton is a 53 y.o.  male who presented to the Emergency Department complaining of foot ulcer. He was just discharged from an outside hospital after an extended inpatient course of IV antibiotics and recommendation to have a foot amputation. He declined that opinion and immediately came to our ER for a second opinion. He will not say whether he would agree to an amputation if the second opinion agrees. He does not meet sepsis criteria. We will admit him for management. Past Medical History:   Diagnosis Date    Aneurysm [facility])     (with stroke)    Bladder tumor     Cancer ([address]     bladder CA    Chronic pain     Family history of bladder cancer     GERD (gastroesophageal reflux disease)     Gout     History of vascular access device 2019    [facility] VAT 4 FR MIDLINE R Cephalic Limited access    History of vascular access device 2019    4 fr Midline right Cephalic midline access    Hypercholesterolemia     Hypertension     Lesion of bladder     Seizures ([address]     Stroke ([address]     left sided weakness    Thromboembolus ([address]     left leg    Thyroid disease     TIA (transient ischemic attack)         Past Surgical History:   Procedure Laterality Date    HX ORTHOPAEDIC      R arthroscopy    HX OTHER SURGICAL      x2 L foot    HX UROLOGICAL  2018    Cystoscopy, TURBT (greater than 5 cm resected) Dr. Balta Lepe, [facility].     KNEE ARTHROSCP HARV      left knee    TRANSURETHRAL RESEC BLADDER NECK  08/10/2018       Social History     Tobacco Use    Smoking status: Current Every Day Smoker     Packs/day: 0.50    Smokeless tobacco: Never Used    Tobacco comment: instructed not to smoke 24 hrs prior surgery   Substance Use Topics    Alcohol use: Yes     Alcohol/week: 6.0 standard drinks     Types: 6 Cans of beer per week     Comment: occasional        Family History   Problem Relation Age of Onset    Diabetes Father     Lung Disease Father     Diabetes Sister     Diabetes Brother     Cancer Paternal Grandfather         Bladder      Family hx cannot be fully assessed, since the patient cannot provide information    Allergies   Allergen Reactions    Sulfamethoxazole-Trimethoprim Rash     L eye and L hand    Ciprofloxacin Hives     Per pcp records    Codeine Hives     Tolerates dilaudid, oxycodone    Cymbalta [Duloxetine] Other (comments)     Confusion and memory loss    Gabapentin Hives and Diarrhea     No longer allergic to it.  Lyrica [Pregabalin] Hives    Sulfa (Sulfonamide Antibiotics) Rash    Ultram [Tramadol] Hives    Vancomycin Shortness of Breath        Prior to Admission medications    Medication Sig Start Date End Date Taking? Authorizing Provider   ibuprofen (MOTRIN) 400 mg tablet  8/5/19   Provider, Historical   Aspirin-Acetaminophen-Caffeine 500-325-65 mg pwpk Take  by mouth. Provider, Historical   terbinafine HCL (LAMISIL) 250 mg tablet  10/21/18   Provider, Historical   tamsulosin (FLOMAX) 0.4 mg capsule TAKE ONE CAPSULE BY MOUTH ONE TIME DAILY (after dinner) 8/7/20   Delicia Grover MD   aspirin 81 mg chewable tablet Take 81 mg by mouth.     Provider, Historical   gabapentin (NEURONTIN) 400 mg capsule TAKE 1 CAPSULE BY MOUTH TWICE A DAY 5/12/19   Provider, Historical   naproxen (NAPROSYN) 500 mg tablet  8/3/13   Provider, Historical   pantoprazole (PROTONIX) 40 mg tablet  7/17/19   Provider, Historical   CHANTIX STARTING MONTH BOX 0.5 mg (11)- 1 mg (42) DsPk TAKE 1 TABLET BY MOUTH IN MORNING WITH FOOD FOR 3 DAYS THEN INCREASE TO 1 TABLET BY MOUTH TWICE DAILY WITH FOOD THEREAFTER AS DIRECTED ON PA 6/21/19   Provider, Historical   allopurinol (ZYLOPRIM) 100 mg tablet Take 1 Tab by mouth daily. Indications: treatment to prevent acute gout attack 5/24/19   Hildegard Heri EDOUARD NP   levothyroxine (SYNTHROID) 100 mcg tablet Take 1 Tab by mouth Daily (before breakfast). Indications: hypothyroidism 5/24/19   Hildegard Devoid A, NP   sertraline (ZOLOFT) 50 mg tablet Take 1 Tab by mouth daily. Indications: major depressive disorder 5/23/19   Hildegard Devoid A, NP   senna-docusate (DARELL-COLACE) 8.6-50 mg per tablet Take 1 Tab by mouth daily. Provider, Historical   lisinopril (PRINIVIL, ZESTRIL) 20 mg tablet Take 2 Tabs by mouth daily. Indications: high blood pressure  Patient taking differently: Take 20 mg by mouth daily. Indications: high blood pressure 5/10/19   Marcella Moss MD   methadone (DOLOPHINE) 10 mg/mL solution Take 135 mg by mouth daily. Indications: excessive pain    Provider, Historical   colchicine 0.6 mg tablet Take 0.6 mg by mouth daily as needed (gout flare). Provider, Historical   acetaminophen (TYLENOL) 325 mg tablet Take 2 Tabs by mouth every four (4) hours as needed. Indications: Arthritic Pain 10/12/18   Josh Poole MD   melatonin 3 mg tablet Take 1 Tab by mouth nightly as needed.  10/12/18   Josh Poole MD       Review of Systems:  (bold if positive, if negative)    Gen:  Eyes:  ENT:  CVS:  Pulm:  GI:  :  MS:  Pain, weakness, swelling, arthritisSkin:   erythema, abscess, wound,Psych:  Endo:  Hem:  Renal:  Neuro:        Objective:      VITALS:    Vital signs reviewed; most recent are:    Visit Vitals  BP (!) 140/66   Pulse 71   Temp 98.2 °F (36.8 °C)   Resp 18   Ht 6' 1\" (1.854 m)   Wt 109.8 kg (242 lb)   SpO2 98%   BMI 31.93 kg/m²     SpO2 Readings from Last 6 Encounters:   10/21/20 98%   10/15/19 99%   06/19/19 96%   05/27/19 95%   05/20/19 99%   05/10/19 92%        No intake or output data in the 24 hours ending 10/21/20 0306     Exam:     Physical Exam:    Gen:  Obese, disheveled, in no acute distress  HEENT:  Pink conjunctivae, PERRL, hearing intact to voice, moist mucous membranes  Neck:  Supple, without masses, thyroid non-tender  Resp:  No accessory muscle use, clear breath sounds without wheezes rales or rhonchi  Card:  No murmurs, normal S1, S2 without thrills, bruits or peripheral edema  Abd:  Soft, non-tender, non-distended, normoactive bowel sounds are present, no mass  Lymph:  No cervical or inguinal adenopathy  Musc:  No cyanosis or clubbing  Skin:  Left foot in oozing dressing, skin turgor is good  Neuro:  Cranial nerves are grossly intact, general motor weakness, follows commands   Psych:  Poor insight, oriented to person, place and time, alert     Labs:    Recent Labs     10/21/20  0007   WBC 18.0*   HGB 10.3*   HCT 33.3*   *     Recent Labs     10/21/20  0007   *   K 3.9      CO2 26   GLU 95   BUN 12   CREA 0.72   CA 8.5   ALB 3.7   TBILI 0.2   ALT 25     Lab Results   Component Value Date/Time    Glucose (POC) 150 (H) 03/19/2018 11:45 AM    Glucose (POC) 123 (H) 03/18/2018 08:55 PM     No results for input(s): PH, PCO2, PO2, HCO3, FIO2 in the last 72 hours. No results for input(s): INR, INREXT in the last 72 hours. All Micro Results     Procedure Component Value Units Date/Time    CULTURE, BLOOD, PERIPHERAL [[id number]]     Order Status:  Sent Specimen:  Blood     CULTURE, BLOOD, PERIPHERAL [[id number]]     Order Status:  Sent Specimen:  Blood           I have reviewed previous records       Assessment and Plan:      Acute on chronic L foot wound infection / Leukocytosis - POA, chronic for years. Discharged less than 24hr ago from an outside hospital and just came to our ER for second opinion. Will check EUGENE and consult podiatry and vascular. Since he does not meet SIRS criteria and he just completed a full course of antibiotics, I will not start any additional Abx.      Physical debility / Hx of hemorrhagic CVA w/ L sided hemiparesis -  Due to prior psychological issues, chronic methadone/narcotics and homelessness, we discovered during his last admission that no SNF will accept him. Will do PT/OT eval. Hold ASA in case of surgery     Chronic pain / Chronic narcotic use / Anxiety and depression - Continue methadone, gabapentin, senakot, sertraline, melatonin, prn PO dilaudid. No IV narcotics.     HTN (hypertension) - POA, stable on norvasc, lisinopril    Anemia - POA due to chronic disease. Monitor.     Hypothyroid - POA,  Resumed synthroid.      Hx of bladder CA - s/p surgery in 04/[facility] at [facility] need to f/u with his Urologist. Jorge Mann flomax     Hx gout - Allopurinol     Telemetry reviewed:   normal sinus rhythm    Risk of deterioration: high      Total time spent with patient: 79 Minutes I personally reviewed chart, notes, data and current medications in the medical record. I have personally examined and treated the patient at bedside during this period.                  Care Plan discussed with: Patient, Nursing Staff and >50% of time spent in counseling and coordination of care    Discussed:  Care Plan       ___________________________________________________    Attending Physician: Gayatri Dove MD

## 2020-10-21 NOTE — PROGRESS NOTES
Bedside and Verbal shift change report given to [employer] (oncoming nurse) by [employer] (offgoing nurse). Report included the following information SBAR, Kardex and MAR.

## 2020-10-21 NOTE — PROGRESS NOTES
Occupational therapy note:  Orders received, chart reviewed. Patient received supine in bed, moaning in pain with little interaction with therapist today. Patient caregiver present in room and answers questions regarding baseline and PLOF though responses unclear. Patient caregiver reports patient paralyzed on L side after stroke, but also reports ambulating with cane and managing own ADLs. Patient MD with recommendation for BKA, however no decision made at this time. Will follow up with patient for OT evaluation as able. Halina Griffith MS OTR/L

## 2020-10-21 NOTE — ED TRIAGE NOTES
Patient arrives with complaint of left foot/left lower leg pain and wound. Present with bandaged left foot, left leg swelling, redness, and reports wheeping. States that he has had the wound for two years, and has had two grafts. States \"I've been to every wound place in [place]. \"    Reports recently treated 3 weeks ago in [place], [place], and being told that he would need amputation, but patient refused and desired different opinion. Patient reports taking dilaudid for pain, and taking medication today prior to arrival.     Patient unable to walk or bear weight on foot. Hx of gout, and stroke in 2006.

## 2020-10-21 NOTE — PROGRESS NOTES
Orders acknowledged, chart reviewed. Met with pt to attempt PT eval, caregiver present. Pt drowsy, groaning, reported L LE pain, otherwise not participating in conversation, unable to tolerate evaluation at this time. Caregiver notes pt bed bound in hospital for last 30 days. Caregiver reports pt baseline as \"paralyzed\", but ambulating with cane and \"doing his own thing in the bathroom. \" Noted vascular surgery consult with recommendation of MIRZA MCKEON. Will continue to follow for PT evaluation as tolerated.      Bela Orourke, PT, MPT

## 2020-10-21 NOTE — PROGRESS NOTES
[facility] [address]  [address] [place] [address], [address] [place] [address]  [phone]      Medical Progress Note      NAME: Heydi Bright   :  1965  MRM:  [id number]    Date/Time of service: 10/21/2020  11:44 AM       Subjective:     Chief Complaint:  Patient was personally seen and examined by me during this time period. Chart reviewed. C/o L leg pain. No fevers, chills       Objective:       Vitals:       Last 24hrs VS reviewed since prior progress note. Most recent are:    Visit Vitals  /64 (BP 1 Location: Left arm, BP Patient Position: At rest)   Pulse 60   Temp 98.6 °F (37 °C)   Resp 18   Ht 6' 1\" (1.854 m)   Wt 109.8 kg (242 lb)   SpO2 98%   BMI 31.93 kg/m²     SpO2 Readings from Last 6 Encounters:   10/21/20 98%   10/15/19 99%   19 96%   19 95%   19 99%   05/10/19 92%            Intake/Output Summary (Last 24 hours) at 10/21/2020 1144  Last data filed at 10/21/2020 1131  Gross per 24 hour   Intake 100 ml   Output 700 ml   Net -600 ml        Exam:     Physical Exam:    Gen:  Disheveled, ill-appearing, obese, NAD  HEENT:  Pink conjunctivae, PERRL, hearing intact to voice, moist mucous membranes  Neck:  Supple, without masses, thyroid non-tender  Resp:  No accessory muscle use, clear breath sounds without wheezes rales or rhonchi  Card:  No murmurs, normal S1, S2 without thrills, 1+ edema  Abd:  Soft, non-tender, non-distended, normoactive bowel sounds are present  Musc:  No cyanosis or clubbing  Skin:  Large open wound from L ankle to mid leg with some necrosis.   Some area with good granulation tissue  Neuro:  Cranial nerves 3-12 are grossly intact, chronic L hemiparesis, follows commands appropriately  Psych:  poor insight, oriented to person, place and time, alert    Medications Reviewed: (see below)    Lab Data Reviewed: (see below)    ______________________________________________________________________    Medications:     Current Facility-Administered Medications   Medication Dose Route Frequency    allopurinoL (ZYLOPRIM) tablet 100 mg  100 mg Oral DAILY    pantoprazole (PROTONIX) tablet 40 mg  40 mg Oral ACB    tamsulosin (FLOMAX) capsule 0.4 mg  0.4 mg Oral DAILY    methadone (DOLOPHINE) tablet 140 mg  140 mg Oral DAILY    nicotine (NICODERM CQ) 21 mg/24 hr patch 1 Patch  1 Patch TransDERmal DAILY    lisinopriL (PRINIVIL, ZESTRIL) tablet 20 mg  20 mg Oral DAILY    senna-docusate (PERICOLACE) 8.6-50 mg per tablet 1 Tab  1 Tab Oral DAILY    HYDROmorphone (DILAUDID) tablet 2 mg  2 mg Oral Q4H PRN    sodium chloride (NS) flush 5-40 mL  5-40 mL IntraVENous Q8H    sodium chloride (NS) flush 5-40 mL  5-40 mL IntraVENous PRN    acetaminophen (TYLENOL) tablet 650 mg  650 mg Oral Q6H PRN    Or    acetaminophen (TYLENOL) suppository 650 mg  650 mg Rectal Q6H PRN    polyethylene glycol (MIRALAX) packet 17 g  17 g Oral DAILY PRN    ondansetron (ZOFRAN ODT) tablet 4 mg  4 mg Oral Q8H PRN    Or    ondansetron (ZOFRAN) injection 4 mg  4 mg IntraVENous Q6H PRN    famotidine (PEPCID) tablet 20 mg  20 mg Oral BID    enoxaparin (LOVENOX) injection 40 mg  40 mg SubCUTAneous DAILY    gabapentin (NEURONTIN) capsule 100 mg  100 mg Oral TID    colchicine tablet 0.6 mg  0.6 mg Oral DAILY PRN    [START ON 10/22/2020] levothyroxine (SYNTHROID) tablet 112 mcg  112 mcg Oral ACB    lidocaine (XYLOCAINE) 2 % jelly   Mucous Membrane PRN          Lab Review:     Recent Labs     10/21/20  0007   WBC 18.0*   HGB 10.3*   HCT 33.3*   *     Recent Labs     10/21/20  0007   *   K 3.9      CO2 26   GLU 95   BUN 12   CREA 0.72   CA 8.5   ALB 3.7   TBILI 0.2   ALT 25     Lab Results   Component Value Date/Time    Glucose (POC) 150 (H) 03/19/2018 11:45 AM    Glucose (POC) 123 (H) 03/18/2018 08:55 PM    Glucose (POC) 96 03/16/2018 04:58 PM    Glucose (POC) 116 (H) 12/12/2012 12:42 PM    Glucose (POC) 99 12/11/2012 11:27 PM          Assessment / Plan:     52 yo hx of HTN, CVA w/ L hemiparesis, bladder CA, depression, chronic pain, chronic L foot/leg infection, presented w/ worsening infection    1) Acute on chronic L foot/leg infection: has been to multiple facilities. Plastics surg at [facility] recommended amputation, but patient came to [address] [place] [address] for a second opinion. Will hold on IV abx. Consult ID, vascular surg, wound care team    2) Hx of hemorrhagic CVA w/ L hemiparesis: cont ASA, PT/OT    3) HTN: cont lisinopril    4) Hx of bladder CA: f/u with Urologist in [place]. Cont flomax    5) Chronic pain/depression/anxiety: cont methadone, neurontin.   Use IV dilaudid prn severe pain    6) Hypothyroid: cont synthroid     Total time spent with patient: 39 Minutes **I personally saw and examined the patient during this time period**                 Care Plan discussed with: Patient, nursing    Discussed:  Care Plan    Prophylaxis:  Lovenox    Disposition:  SNF/LTC           ___________________________________________________    Attending Physician: Marie Ochoa MD

## 2020-10-21 NOTE — ED PROVIDER NOTES
History of hypertension, lipidemia, stroke, bladder cancer, chronic pain, GERD, gout,, seizures, TIA. He presents with complaints of a left foot and left lower leg wound with associated pain. He states that he has had the wounds for 2 years. He states that he has been to multiple wound clinics in [place]. He is status post 2 grafts. He was recently admitted to a hospital in [place]. He was told that he needed an amputation of his left foot. He decided to get another opinion. He has been taking Dilaudid for pain. He is currently off antibiotics. No fever. Past Medical History:   Diagnosis Date    Aneurysm [facility])     (with stroke)    Bladder tumor     Cancer ([facility])     bladder CA    Chronic pain     Family history of bladder cancer     GERD (gastroesophageal reflux disease)     Gout     History of vascular access device 04/25/2019    [facility] VAT 4 FR MIDLINE R Cephalic Limited access    History of vascular access device 04/26/2019    4 fr Midline right Cephalic midline access    Hypercholesterolemia     Hypertension     Lesion of bladder     Seizures ([facility])     Stroke ([facility]) 2006    left sided weakness    Thromboembolus ([facility])     left leg    Thyroid disease     TIA (transient ischemic attack) 2012       Past Surgical History:   Procedure Laterality Date    HX ORTHOPAEDIC      R arthroscopy    HX OTHER SURGICAL      x2 L foot    HX UROLOGICAL  04/20/2018    Cystoscopy, TURBT (greater than 5 cm resected) Dr. Italo Rios, [facility].     KNEE ARTHROSCP HARV      left knee    TRANSURETHRAL RESEC BLADDER NECK  08/10/2018         Family History:   Problem Relation Age of Onset    Diabetes Father     Lung Disease Father     Diabetes Sister     Diabetes Brother     Cancer Paternal Grandfather         Bladder       Social History     Socioeconomic History    Marital status:      Spouse name: Not on file    Number of children: Not on file    Years of education: Not on file  Highest education level: Not on file   Occupational History    Not on file   Social Needs    Financial resource strain: Not very hard    Food insecurity     Worry: Never true     Inability: Never true    Transportation needs     Medical: No     Non-medical: No   Tobacco Use    Smoking status: Current Every Day Smoker     Packs/day: 0.50    Smokeless tobacco: Never Used    Tobacco comment: instructed not to smoke 24 hrs prior surgery   Substance and Sexual Activity    Alcohol use: Yes     Alcohol/week: 6.0 standard drinks     Types: 6 Cans of beer per week     Comment: occasional    Drug use: Yes     Types: Prescription    Sexual activity: Yes   Lifestyle    Physical activity     Days per week: 7 days     Minutes per session: 30 min    Stress: Not at all   Relationships    Social connections     Talks on phone: Not on file     Gets together: Three times a week     Attends [employer] service: Patient refused     Active member of club or organization: Patient refused     Attends meetings of clubs or organizations: Never     Relationship status:     Intimate partner violence     Fear of current or ex partner: No     Emotionally abused: No     Physically abused: No     Forced sexual activity: No   Other Topics Concern     Service No    Blood Transfusions No     Comment: refused to make decision    Caffeine Concern No    Occupational Exposure No    Hobby Hazards No    Sleep Concern No    Stress Concern No    Weight Concern No    Special Diet No    Back Care No    Exercise No    Bike Helmet No    Seat Belt Yes    Self-Exams No   Social History Narrative    60year old  male admitted for reported SI in the context of bladder CA, chronic pain, homelessness, and opiod dependence.  Pt has been in multiple psychiatric admissions for the same compliants in the last 2 years,         ALLERGIES: Sulfamethoxazole-trimethoprim; Ciprofloxacin; Codeine; Cymbalta [duloxetine]; Gabapentin; Lyrica [pregabalin]; Sulfa (sulfonamide antibiotics); Ultram [tramadol]; and Vancomycin    Review of Systems   All other systems reviewed and are negative. Vitals:    10/20/20 2219   BP: 103/67   Pulse: 71   Resp: 18   Temp: 98.2 °F (36.8 °C)   SpO2: 96%   Weight: 109.8 kg (242 lb)   Height: 6' 1\" (1.854 m)            Physical Exam  Vitals signs and nursing note reviewed. Constitutional:       Appearance: He is well-developed. Comments: Obese, debilitated. HENT:      Head: Normocephalic and atraumatic. Eyes:      Conjunctiva/sclera: Conjunctivae normal.   Neck:      Musculoskeletal: Neck supple. Trachea: No tracheal deviation. Cardiovascular:      Rate and Rhythm: Normal rate and regular rhythm. Heart sounds: Normal heart sounds. No murmur. No friction rub. No gallop. Pulmonary:      Effort: Pulmonary effort is normal.      Breath sounds: Normal breath sounds. Abdominal:      Palpations: Abdomen is soft. Tenderness: There is no abdominal tenderness. Musculoskeletal:         General: No deformity. Skin:     General: Skin is warm and dry. Comments: See pictures of wounds below. Neurological:      Mental Status: He is alert. Comments: oriented          MDM       Procedures                Perfect Serve Consult for Admission  2:32 AM    ED Room Number: ER07/07  Patient Name and age:  Shahla Erazo 53 y.o.  male  Working Diagnosis: Left lower leg and foot wound infections  COVID-19 Suspicion:  no  Sepsis present:  no  Reassessment needed: no  Code Status:  Full Code  Readmission: yes  Isolation Requirements:  no  Recommended Level of Care:  med/surg  Department:[facility] ED - [phone]  Other: Chronic wounds left lower leg and foot. He presents over concerns about their appearance. He was recently admitted in [place]. He was told he likely needs an amputation. He wants another opinion. White blood cell count is 18,000.   I think he needs IV antibiotics and vascular surgery consultation. Consult note: Dr. Erlinda Núñez -will admit.   Lis Rothman MD

## 2020-10-21 NOTE — CONSULTS
Infectious Disease Consult    Impression/Plan   · Chronic left foot wound. The patient just completed a 2-week course of ciprofloxacin, amoxicillin, and minocycline less than 1 week ago. An MRI  has been ordered. Podiatry and vascular surgery consults are pending. I explained to the patient and his wife at bedside that based on the complexity of this wounds and the fact that there is no plastic surgery service available here he would be better off at a tertiary center such as [facility] or [facility]. The wound does not appear acutely infected. Previous cultures grew stenotrophomonas. I am afraid unnecessary antibiotics are going to lead to progressively more resistant organisms. Monitor off antibiotics for now. Continue local wound care. · Vancomycin and sulfa allergies. Ciprofloxacin is listed as an allergy however he just finished a course of this antibiotic  · Leukocytosis. He remains afebrile. Follow trend. Blood cultures pending        Anti-infectives:   1. None    Subjective:   Date of Consultation:  October 21, 2020  Date of Admission: 10/21/2020   Referring Physician:     Patient is a 53 y.o. male with a past medical history significant for bladder cancer, gout, and seizure disorder who is being seen for a second opinion regarding a chronic left foot wound. The patient has a very complicated past medical history. He has had chronic wounds involving the left foot for approximately 2 years. He has been seen by multiple wound clinics. He has been seen at [facility] in the past.  He was just recently admitted to [facility] EM[facility].   He was seen by podiatry and plastic surgery there and it was felt that he needed an amputation of the foot. He was not amenable to amputation and he presents to [place] [address] for a second opinion.   Per discussion with pharmacy he was treated at [facility] with a short course of daptomycin in early October before transitioning to ciprofloxacin and amoxicillin from 10/2-10/16 as well as minocycline from 10/3  through 10/17. 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    FH: bladder cancer Z80.52    Left foot infection L08.9    Major depression X43.7    Uncomplicated opioid dependence (HCC) F11.20    Chronic obstructive pulmonary disease (HCC) J44.9    Chronic pain disorder G89.4    Hypokalemia E87.6    Seizure disorder ([facility]) G40.909    Tobacco abuse Z72.0    Foot ulcer ([facility]) L97.509    Nonadherence to medical treatment Z91.19    Sinus bradycardia R00.1    Gout M10.9    Hypothyroidism E03.9    Foot infection L08.9    Depression F32.9    Open wound, lower leg S81.809A     Past Medical History:   Diagnosis Date    Aneurysm ([facility]     (with stroke)    Bladder tumor     Cancer ([facility]     bladder CA    Chronic pain     Family history of bladder cancer     GERD (gastroesophageal reflux disease)     Gout     History of vascular access device 04/25/2019    [facility] VAT 4 FR MIDLINE R Cephalic Limited access    History of vascular access device 04/26/2019    4 fr Midline right Cephalic midline access    Hypercholesterolemia     Hypertension     Lesion of bladder     Seizures ([facility]     Stroke ([facility] 2006    left sided weakness    Thromboembolus ([address]     left leg    Thyroid disease     TIA (transient ischemic attack) 2012      Family History   Problem Relation Age of Onset    Diabetes Father     Lung Disease Father     Diabetes Sister     Diabetes Brother     Cancer Paternal Grandfather         Bladder      Social History     Tobacco Use    Smoking status: Current Every Day Smoker     Packs/day: 0.50    Smokeless tobacco: Never Used    Tobacco comment: instructed not to smoke 24 hrs prior surgery   Substance Use Topics    Alcohol use: Yes     Alcohol/week: 6.0 standard drinks     Types: 6 Cans of beer per week     Comment: occasional     Past Surgical History:   Procedure Laterality Date    HX ORTHOPAEDIC      R arthroscopy    HX OTHER SURGICAL      x2 L foot    HX UROLOGICAL  04/20/2018    Cystoscopy, TURBT (greater than 5 cm resected) Dr. Ying Saavedra, [facility].  KNEE ARTHROSCP HARV      left knee    TRANSURETHRAL RESEC BLADDER NECK  08/10/2018      Prior to Admission medications    Medication Sig Start Date End Date Taking? Authorizing Provider   gabapentin (NEURONTIN) 100 mg capsule Take 100 mg by mouth three (3) times daily. Yes Provider, Historical   levothyroxine (SYNTHROID) 112 mcg tablet Take 112 mcg by mouth Daily (before breakfast). Yes Provider, Historical   lisinopriL (PRINIVIL, ZESTRIL) 20 mg tablet Take 20 mg by mouth daily. Yes Provider, Historical   naproxen sodium (Aleve) 220 mg tablet Take 220 mg by mouth three (3) times daily as needed. Yes Provider, Historical   ibuprofen (MOTRIN) 400 mg tablet Take 800 mg by mouth every six (6) hours as needed. 8/5/19  Yes Provider, Historical   tamsulosin (FLOMAX) 0.4 mg capsule TAKE ONE CAPSULE BY MOUTH ONE TIME DAILY (after dinner) 8/7/20  Yes Allen Witt MD   aspirin 81 mg chewable tablet Take 81 mg by mouth daily. Yes Provider, Historical   pantoprazole (PROTONIX) 40 mg tablet Take 40 mg by mouth daily as needed.  7/17/19  Yes Provider, Historical CHANTIX STARTING MONTH BOX 0.5 mg (11)- 1 mg (42) DsPk TAKE 1 TABLET BY MOUTH IN MORNING WITH FOOD FOR 3 DAYS THEN INCREASE TO 1 TABLET BY MOUTH TWICE DAILY WITH FOOD THEREAFTER AS DIRECTED ON PA 19  Yes Provider, Historical   allopurinol (ZYLOPRIM) 100 mg tablet Take 1 Tab by mouth daily. Indications: treatment to prevent acute gout attack 19  Yes Trisha Maguire NP   senna-docusate (DARELL-COLACE) 8.6-50 mg per tablet Take 1 Tab by mouth daily. Yes Provider, Historical   methadone (DOLOPHINE) 10 mg/mL solution Take 140 mg by mouth daily. Indications: excessive pain   Yes Provider, Historical   colchicine 0.6 mg tablet Take 0.6 mg by mouth daily as needed (gout flare). Yes Provider, Historical   melatonin 3 mg tablet Take 1 Tab by mouth nightly as needed. 10/12/18  Yes Favio Roche MD     Allergies   Allergen Reactions    Sulfamethoxazole-Trimethoprim Rash     L eye and L hand    Ciprofloxacin Hives     Per pcp records    Codeine Hives     Tolerates dilaudid, oxycodone    Cymbalta [Duloxetine] Other (comments)     Confusion and memory loss    Lyrica [Pregabalin] Hives    Sulfa (Sulfonamide Antibiotics) Rash    Ultram [Tramadol] Hives    Vancomycin Shortness of Breath        Review of Systems:  A comprehensive review of systems was negative except for that written in the History of Present Illness. Objective:   Blood pressure 113/64, pulse 60, temperature 98.6 °F (37 °C), resp. rate 18, height 6' 1\" (1.854 m), weight 242 lb (109.8 kg), SpO2 98 %. Temp (24hrs), Av.4 °F (36.9 °C), Min:97.5 °F (36.4 °C), Max:98.9 °F (37.2 °C)               Exam:    General:  Alert, cooperative. Moderate distress due to pain with dressing change   Eyes:  Sclera anicteric.    Mouth/Throat: Mucous membranes moist   Neck: Supple   Lungs:    No distress   CV:     Abdomen:    Nondistended   Extremities:  See images   Skin:  No rash   Lymph nodes:    Musculoskeletal:    Lines/Devices:  Intact, no erythema, drainage or tenderness   Psych: Alert and oriented, normal mood affect given the setting       Data Review:   Recent Results (from the past 24 hour(s))   CBC WITH AUTOMATED DIFF    Collection Time: 10/21/20 12:07 AM   Result Value Ref Range    WBC 18.0 (H) 4.1 - 11.1 K/uL    RBC 3.98 (L) 4.10 - 5.70 M/uL    HGB 10.3 (L) 12.1 - 17.0 g/dL    HCT 33.3 (L) 36.6 - 50.3 %    MCV 83.7 80.0 - 99.0 FL    MCH 25.9 (L) 26.0 - 34.0 PG    MCHC 30.9 30.0 - 36.5 g/dL    RDW 15.9 (H) 11.5 - 14.5 %    PLATELET 062 (H) 978 - 400 K/uL    MPV 9.4 8.9 - 12.9 FL    NRBC 0.0 0  WBC    ABSOLUTE NRBC 0.00 0.00 - 0.01 K/uL    NEUTROPHILS 66 32 - 75 %    LYMPHOCYTES 22 12 - 49 %    MONOCYTES 9 5 - 13 %    EOSINOPHILS 1 0 - 7 %    BASOPHILS 1 0 - 1 %    IMMATURE GRANULOCYTES 1 (H) 0.0 - 0.5 %    ABS. NEUTROPHILS 12.1 (H) 1.8 - 8.0 K/UL    ABS. LYMPHOCYTES 4.0 (H) 0.8 - 3.5 K/UL    ABS. MONOCYTES 1.5 (H) 0.0 - 1.0 K/UL    ABS. EOSINOPHILS 0.2 0.0 - 0.4 K/UL    ABS. BASOPHILS 0.1 0.0 - 0.1 K/UL    ABS. IMM. GRANS. 0.2 (H) 0.00 - 0.04 K/UL    DF AUTOMATED     METABOLIC PANEL, COMPREHENSIVE    Collection Time: 10/21/20 12:07 AM   Result Value Ref Range    Sodium 135 (L) 136 - 145 mmol/L    Potassium 3.9 3.5 - 5.1 mmol/L    Chloride 103 97 - 108 mmol/L    CO2 26 21 - 32 mmol/L    Anion gap 6 5 - 15 mmol/L    Glucose 95 65 - 100 mg/dL    BUN 12 6 - 20 MG/DL    Creatinine 0.72 0.70 - 1.30 MG/DL    BUN/Creatinine ratio 17 12 - 20      GFR est AA >60 >60 ml/min/1.73m2    GFR est non-AA >60 >60 ml/min/1.73m2    Calcium 8.5 8.5 - 10.1 MG/DL    Bilirubin, total 0.2 0.2 - 1.0 MG/DL    ALT (SGPT) 25 12 - 78 U/L    AST (SGOT) 17 15 - 37 U/L    Alk.  phosphatase 128 (H) 45 - 117 U/L    Protein, total 7.4 6.4 - 8.2 g/dL    Albumin 3.7 3.5 - 5.0 g/dL    Globulin 3.7 2.0 - 4.0 g/dL    A-G Ratio 1.0 (L) 1.1 - 2.2     SAMPLES BEING HELD    Collection Time: 10/21/20 12:07 AM   Result Value Ref Range    SAMPLES BEING HELD 1RED,1BLU,1SST     COMMENT Add-on orders for these samples will be processed based on acceptable specimen integrity and analyte stability, which may vary by analyte.    LACTIC ACID    Collection Time: 10/21/20 12:07 AM   Result Value Ref Range    Lactic acid 1.5 0.4 - 2.0 MMOL/L   C REACTIVE PROTEIN, QT    Collection Time: 10/21/20 12:07 AM   Result Value Ref Range    C-Reactive protein 0.64 (H) 0.00 - 0.60 mg/dL   CULTURE, BLOOD, PERIPHERAL    Collection Time: 10/21/20  2:51 AM    Specimen: Blood   Result Value Ref Range    Special Requests: NO SPECIAL REQUESTS      Culture result: NO GROWTH AFTER 3 HOURS     CULTURE, BLOOD, PERIPHERAL    Collection Time: 10/21/20  2:51 AM    Specimen: Blood   Result Value Ref Range    Special Requests: NO SPECIAL REQUESTS      Culture result: NO GROWTH AFTER 3 HOURS          Microbiology:      Studies:      Signed By: Bruce Galarza DO     October 21, 2020

## 2020-10-21 NOTE — WOUND CARE
Wound care consult:  Initial visit for wound assessment, alert- pain in left leg and foot- medication per staff for pain. Assessed with Dr Emma Lim- staff at bedside. Assessment  All skin folds and bony prominences assessed, turned with staff assistance. Incontinent of urine and stool- sacral area intact- pink  Left lower leg and dorsal foot wounds- present on admission. Full thickness wounds- areas of red granulation- less than 50% thick yellow adherent slough on the edges- ludwig wound red and pink with areas of dry skin- tender and painful to touch - tan drainage - no odor  Foot erythemic- edematous   Left posterior knee red with like red yeast like rash  Area of linear scratches on left thigh- foot and lower leg contracted - history of stroke  Inner groins and lower pannus red with yeast like rash. Heels intact     Treatment  Wounds irrigated with saline- lidocaine and adaptic applied - dry dressings - tolerated - increased pain with care- staff to medicate   Incontinent care given- zinc applied   Repositioned in bed   Heels floated    Recommendations/Plan  Wound care orders per Dr MENG- vascular- podiatry and MRI pending  Turn, reposition every 2 hours as tolerated, float heels, off loading boots   Incontinent care --- Apply Zinc to all open areas, moisture barrier as needed. Nystatin - interdry   Will follow, reconsult as needed.         [facility]

## 2020-10-21 NOTE — PROGRESS NOTES
[facility]: MED RECONCILIATION    Comments/Recommendations:   Patient reports compliance with current medications. He gets his methadone from [facility] in [facility] (info listed below) and was recently discharged from [facility] in [place], [place]. Previous antibiotic regimen: S/w pharmacist at [facility]. He had been on daptomycin for a few days before transitioning to PO meds. He completed 14-day course of Cipro 750 mg PO Q12H and amoxicillin 500 mg PO Q8H (from 10/2 to 10/16) and Minocycline 100 mg PO Q12H (from 10/3 to 10/17). Pain medications: Patient reports that he alternates between ibuprofen and naproxen for pain relief. He notes that he went through a bottle of #100 tablets of Aleve in about 1 week recently. Counseled about the possible effects of ibuprofen and naproxen taken together and too much of either. Removed gabapentin allergy since patient takes this at home. There was a note that he is no longer allergic to it.      Medications added:     none    Medications removed:    APAP PRN (uses ibuprofen and naproxen for pain PRN)  Sertraline 50 mg daily (no longer taking)  Terbinafine 250 mg     Medications adjusted:    Methadone 140 mg PO daily (confirmed dose with [facility])  Gabapentin 100 mg PO TID (instead of 400 mg TID)  Levothyroxine 112 mcg PO daily (instead of 100 mcg daily)  Lisinopril 20 mg PO daily (instead of 40 mg daily)  Pantoprazole 40 mg PO daily PRN (instead of scheduled)    Information obtained from: patient, Rx query, [facility] for methadone dosing [phone], [facility] (to confirm most recent antibiotic regimen)    Significant PMH/Disease States:   Past Medical History:   Diagnosis Date    Aneurysm ([address]     (with stroke)    Bladder tumor     Cancer ([address]     bladder CA    Chronic pain     Family history of bladder cancer     GERD (gastroesophageal reflux disease)     Gout     History of vascular access device 04/25/2019    [facility] VAT 4 FR MIDLINE R Cephalic Limited access    History of vascular access device 04/26/2019    4 fr Midline right Cephalic midline access    Hypercholesterolemia     Hypertension     Lesion of bladder     Seizures ([facility]     Stroke [facility]) 2006    left sided weakness    Thromboembolus ([facility] [address]     left leg    Thyroid disease     TIA (transient ischemic attack) 2012     Chief Complaint for this Admission:   Chief Complaint   Patient presents with    Foot Pain    Wound Check     Allergies: Sulfamethoxazole-trimethoprim; Ciprofloxacin; Codeine; Cymbalta [duloxetine]; Lyrica [pregabalin]; Sulfa (sulfonamide antibiotics); Ultram [tramadol]; and Vancomycin    Prior to Admission Medications:     Medication Documentation Review Audit       Reviewed by CARLOS VelasquezD (Pharmacist) on 10/21/20 at 0932      Medication Sig Documenting Provider Last Dose Status Taking?   allopurinol (ZYLOPRIM) 100 mg tablet Take 1 Tab by mouth daily. Indications: treatment to prevent acute gout attack Damion EDOUARD NP 10/20/2020 am Active Yes   aspirin 81 mg chewable tablet Take 81 mg by mouth daily. Provider, Historical 10/20/2020 am Active Yes   CHANTIX STARTING MONTH BOX 0.5 mg (11)- 1 mg (42) DsPk TAKE 1 TABLET BY MOUTH IN MORNING WITH FOOD FOR 3 DAYS THEN INCREASE TO 1 TABLET BY MOUTH TWICE DAILY WITH FOOD THEREAFTER AS DIRECTED ON PA Provider, Historical 9/21/2020 Unknown time Active Yes           Med Note NATHALY Harper   Wed Oct 21, 2020  9:17 AM) Patient was apparently taking this prior to his admission at [facility] recently. He reports that he has not had it since then. colchicine 0.6 mg tablet Take 0.6 mg by mouth daily as needed (gout flare). Provider, Historical 10/20/2020 am Active Yes   gabapentin (NEURONTIN) 100 mg capsule Take 100 mg by mouth three (3) times daily. Provider, Historical 10/20/2020 Unknown time Active Yes   ibuprofen (MOTRIN) 400 mg tablet Take 800 mg by mouth every six (6) hours as needed.  Provider, Historical  Active Yes           Med Note (Sami Evens   Wed Oct 21, 2020  9:18 AM) Alternates with naproxen when needed for pain   levothyroxine (SYNTHROID) 112 mcg tablet Take 112 mcg by mouth Daily (before breakfast). Provider, Historical 10/20/2020 am Active Yes   lisinopriL (PRINIVIL, ZESTRIL) 20 mg tablet Take 20 mg by mouth daily. Provider, Historical 10/20/2020 am Active Yes   melatonin 3 mg tablet Take 1 Tab by mouth nightly as needed. Chencho Jeong MD  Active Yes   methadone (DOLOPHINE) 10 mg/mL solution Take 140 mg by mouth daily. Indications: excessive pain Provider, Historical 10/20/2020 am Active Yes           Med Note Linus Gonzales 1540 Oct 21, 2020  9:21 AM) Dose confirmed with [facility] phone number of [phone]    naproxen sodium (Aleve) 220 mg tablet Take 220 mg by mouth three (3) times daily as needed. Provider, Historical 10/20/2020 Unknown time Active Yes           Med Note ([facility] Oct 21, 2020  9:23 AM) Alternates with ibuprofen when needed for pain   pantoprazole (PROTONIX) 40 mg tablet Take 40 mg by mouth daily as needed. Provider, Historical 9/21/2020 Unknown time Active Yes           Med Note (NATHALY CATES   Wed Oct 21, 2020  9:27 AM)     senna-docusate (DARELL-COLACE) 8.6-50 mg per tablet Take 1 Tab by mouth daily. Provider, Historical 9/21/2020 Unknown time Active Yes   tamsulosin (FLOMAX) 0.4 mg capsule TAKE ONE CAPSULE BY MOUTH ONE TIME DAILY (after dinner) Mohan Masters MD 10/20/2020 am Active Yes                Thank you for the consult,  Salvatore Weinstein [facility]

## 2020-10-21 NOTE — PROGRESS NOTES
10/21/2020  4:15 PM  CM completed assessment w/ pt and friend Cristo Fitch (C) [phone] in person, CM wore mask at all times. Charted demographics verified, pt has [address] listed, refused to give street address as he is moving soon. Pt resides w/ roommates in 1 story home, her are 3 entry steps w/ rails, pt has had a fall recently. Pt is ambulatory w/ sp cane, requires assistance w/ ADLs, relies on friends or Medicaid for transport    Reason for Admission:   L Foot Infection  Pt is a 54 yo  male who presents to [facility] ED c/o  Foot ulcer and drainage from wound, pt was recently hospitalized at [facility], [facility] and [facility] was being set up               RUR Score:   15%  Moderate Risk d/t co-morbidities               PCP: First and Last name:  Pt has no current PCP   Name of Practice:    Are you a current patient: Yes/No:    Approximate date of last visit:    Can you participate in a virtual visit if needed:     Do you (patient/family) have any concerns for transition/discharge? Pt has limited support from friend Cristo Fitch, is unable to perform ALDs                Plan for utilizing home health:  PT/OT edgar ordered, [facility] from [facility], [place], agency unknown     Current Advanced Directive/Advance Care Plan:     pt states he has AMD, friend Cristo Fitch (C) [phone] is emergency contact         Transition of Care Plan:        1. Hospital admission for medical management  2. CM to follow through for treatment/response  3. PT/OT evcindy  4. Podiatry consult  5. Vascular consult  6. DC when stable  7. Dispo pending progress  8. Establish pt w/ PCP  9. Outpatient f/u PCP, podiatry  10. Transport friend vs M[place]icaid    Care Management Interventions  PCP Verified by CM: No(pt has no current PCP)  Palliative Care Criteria Met (RRAT>21 & CHF Dx)?: Yes  Palliative Consult Recommended?: Yes  Mode of Transport at Discharge:  Other (see comment)(TBD)  Physical Therapy Consult: Yes  Occupational Therapy Consult: Yes  Speech Therapy Consult: No  Current Support Network:  Other(pt lives in pvt resince w/ roommates, ambulatory w/ sp cane, requires assit w/ ADLs, relies on Medicaid or friends for transport)  Confirm Follow Up Transport: Other (see comment)(friends or Medicaid)  Discharge Location  Discharge Placement: Unable to determine at this time  Domenic Santana BS

## 2020-10-21 NOTE — DISCHARGE INSTRUCTIONS
Discharge Instructions for  [facility]  [address] [place], [address] [facility]  Telephone: [phone]    NAME:  Ajit Orona  YOB: 1965  DATE:  10/21/2020    : Danika Savage RN     [] Wound and dressing supply provider: {DME provider:38473}    [] Home Healthcare: {Home Health :27039}    [] Supplements : {VITAMINS W (Optional):17971}    Wound Cleansing:   Do not scrub or use excessive force. Cleanse wound prior to applying a clean dressing with:    [] Normal Saline   [] Keep Wound Dry in Shower      [] Wound Cleanser (***)  [] Cleanse wound with Mild Soap & Water (ie:yellow dial)  [] May Shower at Discharge   [] Do not shower  [] cleanse with Sullivan Dyke and Sullivan Dyke baby shampoo lather leave 2-3 then rinse with water, pat dry and redress wound. Topical Treatments:  Do not apply lotions, creams, or ointments to wound bed unless directed.      [] Apply moisturizing lotion {alvarez lotions :35170} to skin surrounding the wound prior to dressing change.  [] Bactroban or Mupirocin  [] Gentamicin ointment    [] Other: gentian violet to wound bed and periwound  [] Other:     Dressings:           Wound Location ***     Apply Primary Dressing:        [] MediHoney Gel [] Medihoney CA Alginate    [] Alginate with Silver (Aquacel AG) [] Alginate    [] Endoform [] Collagen with Silver (Nasreen)     [] Santyl with Moisten saline gauze      [] Hydrocolloid   [] Mepilex Foam     [] Mepitel One  [] Adaptic  [] Xeroform     [] Hydrofera Blue Classic (moisten with saline)   [] Hydrafera Blue Ready  [] Hydrafera Blue Transfer       [] Hydrogel   [] Intrasite     [] Other:       Pack wound loosely with:                                                  [] Iodoform     [] Plain Packing  [] Other :  [] Mesalt    Cover and Secure with:    [] Gauze [] ABD  [] exudry       [] William [] Kerlix          [] Mepilex Border {mepilex foam with boarder :62274:::1}    [] Ace Wrap [] Other:     [] Roll Tape       Change dressing:     [] Daily      [] Every Other Day   [] Three times per week  [] Once a week   [] Do Not Change Dressing     [] Other:      Dressings:           Wound Location ***     Apply Primary Dressing:        [] MediHoney Gel [] Medihoney CA Alginate    [] Alginate with Silver (Aquacel AG) [] Alginate    [] Endoform [] Collagen with Silver (Nasreen)     [] Santyl with Moisten saline gauze      [] Hydrocolloid   [] Mepilex Foam     [] Mepitel One  [] Adaptic  [] Xeroform     [] Hydrofera Blue Classic (moisten with saline)   [] Hydrafera Blue Ready  [] Hydrafera Blue Transfer       [] Hydrogel   [] Intrasite     [] Other:       Pack wound loosely with:                                                  [] Iodoform     [] Plain Packing  [] Other :  [] Mesalt    Cover and Secure with:    [] Gauze [] ABD  [] exudry       [] William [] Kerlix          [] Mepilex Border {mepilex foam with boarder :61420:::1}    [] Ace Wrap [] Other:     [] Roll Tape       Change dressing:     [] Daily      [] Every Other Day   [] Three times per week  [] Once a week   [] Do Not Change Dressing     [] Other:      Negative Pressure:           Wound Location: ***    [] Pressure@ {vacpressure:90287}mm/Hg    [] Continuous            [] Intermittent  [] Black             [] White Foam   [] Other:   Change dressing:  [] Two times per week  []Three times per week    []Other:      Pressure Relief:  [] When sitting, shift position or do seat lifts every 15 minutes.  [] Wheelchair cushion   [] Specialty Bed/Mattress  [] Turn every 2 hours when in bed. Avoid position directing pressure on wound site. Edema Control: Every morning immediately when getting up should be applied to affected leg(s) from mid foot to knee making sure to cover the heel. Remove every night before going to bed.     Apply: [] Compression Stocking {velcrowrap:46546:::1} []Right Leg []Left Leg     [] Tubigrip {tubigrip:35921}  [] Right Leg Single Layer  [] Right Leg Double Layer         [] Left Leg Single Layer [] Left Leg Double Layer    [] Elevate leg(s)  when sitting. [] Avoid prolonged standing in one place. Compression: Do not get leg(s) with wrap wet. If wraps become too tight call the center or completely remove the wrap. Apply: [] Three Layer Compression Wrap Applied in Clinic  []RightLeg []Left Leg    [] Four layer Compression Wrap Applied in Clinic  []RightLeg []Left Leg     []  Unna's boot   [] Right Leg   [] Left Leg        [] Elevate leg(s)  when sitting. [] Avoid prolonged standing in one place. Off-Loading:   [] Off-loading when [] walking  [] in bed [] sitting    Dietary:  [] Diet as tolerated: [] Diabetic Diet:   [] Increase Protein: examples ( Meat, cheese, eggs, greek yogurt, premier protein drink, fish, nuts )   [] Dial a Dietician : Call [facility] at [phone] enter code ([phone]) when prompted. M-F 9am-5pm EST. [] Other:    Return Appointment:    [] Return Appointment: With Dr. Erven Heimlich     [] Wound assessment with Nurse at wound center in *** days     [] Ordered tests: {OP Wound Vascular Studies:85792} {OP Wound F/U Imagin}     Electronically signed on 10/21/2020 at 8:39 R Yonas Palacios 8 Information: Should you experience any significant changes in your wound(s) or have questions about your wound care, please contact the [facility] at [address] [place] [address] 8:00 am - 4:30. If you need help with your wound outside these hours and cannot wait until we are again available, contact your PCP or go to the hospital emergency room. PLEASE NOTE: IF YOU ARE UNABLE TO OBTAIN WOUND SUPPLIES, CONTINUE TO USE THE SUPPLIES YOU HAVE AVAILABLE UNTIL YOU ARE ABLE TO REACH US. IT IS MOST IMPORTANT TO KEEP THE WOUND COVERED AT ALL TIMES.      Physician Signature:_______________________  Dr. Erven Heimlich

## 2020-10-22 ENCOUNTER — APPOINTMENT (OUTPATIENT)
Dept: VASCULAR SURGERY | Age: 55
DRG: 383 | End: 2020-10-22
Attending: INTERNAL MEDICINE
Payer: COMMERCIAL

## 2020-10-22 LAB
ALBUMIN SERPL-MCNC: 3 G/DL (ref 3.5–5)
ALBUMIN/GLOB SERPL: 0.9 {RATIO} (ref 1.1–2.2)
ALP SERPL-CCNC: 116 U/L (ref 45–117)
ALT SERPL-CCNC: 22 U/L (ref 12–78)
ANION GAP SERPL CALC-SCNC: 4 MMOL/L (ref 5–15)
AST SERPL-CCNC: 17 U/L (ref 15–37)
BILIRUB SERPL-MCNC: 0.6 MG/DL (ref 0.2–1)
BUN SERPL-MCNC: 9 MG/DL (ref 6–20)
BUN/CREAT SERPL: 15 (ref 12–20)
CALCIUM SERPL-MCNC: 8.2 MG/DL (ref 8.5–10.1)
CHLORIDE SERPL-SCNC: 104 MMOL/L (ref 97–108)
CO2 SERPL-SCNC: 28 MMOL/L (ref 21–32)
CREAT SERPL-MCNC: 0.62 MG/DL (ref 0.7–1.3)
ERYTHROCYTE [DISTWIDTH] IN BLOOD BY AUTOMATED COUNT: 15.9 % (ref 11.5–14.5)
GLOBULIN SER CALC-MCNC: 3.5 G/DL (ref 2–4)
GLUCOSE SERPL-MCNC: 81 MG/DL (ref 65–100)
HCT VFR BLD AUTO: 31.2 % (ref 36.6–50.3)
HGB BLD-MCNC: 9.8 G/DL (ref 12.1–17)
MAGNESIUM SERPL-MCNC: 2 MG/DL (ref 1.6–2.4)
MCH RBC QN AUTO: 26.3 PG (ref 26–34)
MCHC RBC AUTO-ENTMCNC: 31.4 G/DL (ref 30–36.5)
MCV RBC AUTO: 83.6 FL (ref 80–99)
NRBC # BLD: 0 K/UL (ref 0–0.01)
NRBC BLD-RTO: 0 PER 100 WBC
PLATELET # BLD AUTO: 397 K/UL (ref 150–400)
PMV BLD AUTO: 9.6 FL (ref 8.9–12.9)
POTASSIUM SERPL-SCNC: 4.1 MMOL/L (ref 3.5–5.1)
PROT SERPL-MCNC: 6.5 G/DL (ref 6.4–8.2)
RBC # BLD AUTO: 3.73 M/UL (ref 4.1–5.7)
SODIUM SERPL-SCNC: 136 MMOL/L (ref 136–145)
WBC # BLD AUTO: 17.2 K/UL (ref 4.1–11.1)

## 2020-10-22 PROCEDURE — 97162 PT EVAL MOD COMPLEX 30 MIN: CPT

## 2020-10-22 PROCEDURE — 74011250637 HC RX REV CODE- 250/637: Performed by: INTERNAL MEDICINE

## 2020-10-22 PROCEDURE — 74011250636 HC RX REV CODE- 250/636: Performed by: INTERNAL MEDICINE

## 2020-10-22 PROCEDURE — 65270000029 HC RM PRIVATE

## 2020-10-22 PROCEDURE — 97535 SELF CARE MNGMENT TRAINING: CPT

## 2020-10-22 PROCEDURE — 99232 SBSQ HOSP IP/OBS MODERATE 35: CPT | Performed by: INTERNAL MEDICINE

## 2020-10-22 PROCEDURE — 85027 COMPLETE CBC AUTOMATED: CPT

## 2020-10-22 PROCEDURE — 83735 ASSAY OF MAGNESIUM: CPT

## 2020-10-22 PROCEDURE — 36415 COLL VENOUS BLD VENIPUNCTURE: CPT

## 2020-10-22 PROCEDURE — 80053 COMPREHEN METABOLIC PANEL: CPT

## 2020-10-22 PROCEDURE — 97530 THERAPEUTIC ACTIVITIES: CPT

## 2020-10-22 PROCEDURE — 74011000250 HC RX REV CODE- 250: Performed by: INTERNAL MEDICINE

## 2020-10-22 PROCEDURE — 97165 OT EVAL LOW COMPLEX 30 MIN: CPT

## 2020-10-22 RX ORDER — HYDROMORPHONE HYDROCHLORIDE 2 MG/ML
2 INJECTION, SOLUTION INTRAMUSCULAR; INTRAVENOUS; SUBCUTANEOUS
Status: DISCONTINUED | OUTPATIENT
Start: 2020-10-22 | End: 2020-10-24

## 2020-10-22 RX ORDER — HYDROMORPHONE HYDROCHLORIDE 2 MG/ML
3 INJECTION, SOLUTION INTRAMUSCULAR; INTRAVENOUS; SUBCUTANEOUS
Status: DISCONTINUED | OUTPATIENT
Start: 2020-10-22 | End: 2020-10-22 | Stop reason: DRUGHIGH

## 2020-10-22 RX ORDER — OXYCODONE HYDROCHLORIDE 5 MG/1
15 TABLET ORAL
Status: DISCONTINUED | OUTPATIENT
Start: 2020-10-22 | End: 2020-10-24

## 2020-10-22 RX ADMIN — HYDROMORPHONE HYDROCHLORIDE 1.5 MG: 2 INJECTION, SOLUTION INTRAMUSCULAR; INTRAVENOUS; SUBCUTANEOUS at 01:15

## 2020-10-22 RX ADMIN — OXYCODONE 15 MG: 5 TABLET ORAL at 13:34

## 2020-10-22 RX ADMIN — GABAPENTIN 100 MG: 100 CAPSULE ORAL at 17:51

## 2020-10-22 RX ADMIN — Medication 10 ML: at 14:00

## 2020-10-22 RX ADMIN — OXYCODONE 10 MG: 5 TABLET ORAL at 20:24

## 2020-10-22 RX ADMIN — ENOXAPARIN SODIUM 40 MG: 40 INJECTION SUBCUTANEOUS at 09:25

## 2020-10-22 RX ADMIN — FAMOTIDINE 20 MG: 20 TABLET, FILM COATED ORAL at 09:25

## 2020-10-22 RX ADMIN — HYDROMORPHONE HYDROCHLORIDE 1.5 MG: 2 INJECTION, SOLUTION INTRAMUSCULAR; INTRAVENOUS; SUBCUTANEOUS at 06:02

## 2020-10-22 RX ADMIN — METHADONE HYDROCHLORIDE 140 MG: 10 TABLET ORAL at 09:24

## 2020-10-22 RX ADMIN — FAMOTIDINE 20 MG: 20 TABLET, FILM COATED ORAL at 17:51

## 2020-10-22 RX ADMIN — HYDROMORPHONE HYDROCHLORIDE 1.5 MG: 2 INJECTION, SOLUTION INTRAMUSCULAR; INTRAVENOUS; SUBCUTANEOUS at 10:29

## 2020-10-22 RX ADMIN — Medication 10 ML: at 21:38

## 2020-10-22 RX ADMIN — ALLOPURINOL 100 MG: 100 TABLET ORAL at 09:25

## 2020-10-22 RX ADMIN — HYDROMORPHONE HYDROCHLORIDE 2 MG: 2 INJECTION, SOLUTION INTRAMUSCULAR; INTRAVENOUS; SUBCUTANEOUS at 17:52

## 2020-10-22 RX ADMIN — TAMSULOSIN HYDROCHLORIDE 0.4 MG: 0.4 CAPSULE ORAL at 09:25

## 2020-10-22 RX ADMIN — DOCUSATE SODIUM 50MG AND SENNOSIDES 8.6MG 1 TABLET: 8.6; 5 TABLET, FILM COATED ORAL at 09:25

## 2020-10-22 RX ADMIN — Medication 10 ML: at 06:06

## 2020-10-22 RX ADMIN — LIDOCAINE HYDROCHLORIDE: 20 JELLY TOPICAL at 06:41

## 2020-10-22 RX ADMIN — HYDROMORPHONE HYDROCHLORIDE 2 MG: 2 INJECTION, SOLUTION INTRAMUSCULAR; INTRAVENOUS; SUBCUTANEOUS at 23:42

## 2020-10-22 RX ADMIN — ASPIRIN 81 MG 81 MG: 81 TABLET ORAL at 09:25

## 2020-10-22 RX ADMIN — ONDANSETRON 4 MG: 4 TABLET, ORALLY DISINTEGRATING ORAL at 02:56

## 2020-10-22 RX ADMIN — LISINOPRIL 20 MG: 20 TABLET ORAL at 09:25

## 2020-10-22 RX ADMIN — LEVOTHYROXINE SODIUM 112 MCG: 0.11 TABLET ORAL at 06:41

## 2020-10-22 RX ADMIN — SODIUM CHLORIDE 500 ML: 900 INJECTION, SOLUTION INTRAVENOUS at 20:39

## 2020-10-22 RX ADMIN — GABAPENTIN 100 MG: 100 CAPSULE ORAL at 09:25

## 2020-10-22 RX ADMIN — PANTOPRAZOLE SODIUM 40 MG: 40 TABLET, DELAYED RELEASE ORAL at 06:41

## 2020-10-22 NOTE — PROGRESS NOTES
10/22/2020  11:17 AM  Pt discussed in IDR rounds, is continuing to require medical management for Acute on Chronic L foot/leg infection, Hx of hemorrhagic CVA w/ L hemiparesis, HTN, bladder CA, chronic pain/depression/anxiety     Transitions of Care Plan:  LOS 1 day  RUR 17%   1. CM to follow through for treatment/response  3. PT/OT evals pending  4. Podiatry consult  5. Vascular recommending amputation  6. Wound Care following  7. DC when stable  8. Dispo pending progress  9. Establish pt w/ PCP  10. Outpatient f/u PCP, podiatry  11.  Transport friend vs Medicaid    CYNTHIA Santana

## 2020-10-22 NOTE — PROGRESS NOTES
Problem: Mobility Impaired (Adult and Pediatric)  Goal: *Acute Goals and Plan of Care (Insert Text)  Description: FUNCTIONAL STATUS PRIOR TO ADMISSION: 1 month ago pt was ambulatory using cane and performed functional mobility without assistance. He was admitted to outside hospital where he reports not mobilizing out of bed x 1 month and sustaining additional injury to LLE at that facility. HOME SUPPORT PRIOR TO ADMISSION: The patient lived with Dawit Mclean to whom he refers as his \". \"    Physical Therapy Goals  Initiated 10/22/2020  1. Patient will move from supine to sit and sit to supine  in bed with supervision/set-up within 7 day(s). 2.  Patient will transfer from bed to chair and chair to bed with maximal assistance using the least restrictive device within 7 day(s). 3.  Patient will demonstrate independence with HEP for strengthening within 7 days. Outcome: Progressing Towards Goal   PHYSICAL THERAPY EVALUATION  Patient: Lord Galeazzi (49 y.o. male)  Date: 10/22/2020  Primary Diagnosis: [facility] ([facility] [D15.406]  Left foot infection [L08.9]        Precautions:   Fall, Bed Alarm, Skin; NWB LLE until MD states otherwise    ASSESSMENT  Based on the objective data described below, the patient presents with generally decreased strength, decreased endurance, severe LLE pain, and very limited functional mobility on day 1 of admission with L foot infection. Pt was admitted to outside hospital for approximately 1 month for work-up of this issue and reports not mobilizing out of bed during that time. Pt reportedly presented to this facility for second opinion; he has not yet agreed to a particular medical plan at time of this assessment. He is agreeable to mobilizing edge of bed after IV pain medication administered. He dangles edge of bed x 5 mins while linens are changed and upper body is bathed before returning to supine for extensive bed mobility.  Recommend maxi-move/grace lift for out of bed transfers at this time    Current Level of Function Impacting Discharge (mobility/balance): total assist transfers    Functional Outcome Measure: The patient scored 20 on the Barthel outcome measure which is indicative of 80% functional impairment. Other factors to consider for discharge: benefits from frequent redirection to therapy goals and encouragement toward goal-setting and future perspective with regards to mobility. Patient will benefit from skilled therapy intervention to address the above noted impairments. PLAN :  Recommendations and Planned Interventions: bed mobility training, transfer training, therapeutic exercises, neuromuscular re-education, modalities, edema management/control, patient and family training/education, and therapeutic activities      Frequency/Duration: Patient will be followed by physical therapy:  5 times a week to address goals. Recommendation for discharge: (in order for the patient to meet his/her long term goals)  Therapy up to 5 days/week in SNF setting    This discharge recommendation:  Has not yet been discussed the attending provider and/or case management    IF patient discharges home will need the following DME: medical transport home, hospital bed, 24-hour assistance, HHPT         SUBJECTIVE:   Patient stated When they did that x-ray it made everything worse.     Pt received supine, agreeable to PT and cleared by RN.  Pt pre-medicated with IV pain medication by RN at pt's request    OBJECTIVE DATA SUMMARY:   HISTORY:    Past Medical History:   Diagnosis Date    Aneurysm ([facility]     (with stroke)    Bladder tumor     Cancer ([address]     bladder CA    Chronic pain     Family history of bladder cancer     GERD (gastroesophageal reflux disease)     Gout     History of vascular access device 04/25/2019    [facility] VAT 4 FR MIDLINE R Cephalic Limited access    History of vascular access device 04/26/2019    4 fr Midline right Cephalic midline access    Hypercholesterolemia     Hypertension     Lesion of bladder     Seiz[facility] ([facility]     Stroke [facility]) 2006    left sided weakness    Thromboembolus ([facility]     left leg    Thyroid disease     TIA (transient ischemic attack) 2012     Past Surgical History:   Procedure Laterality Date    HX ORTHOPAEDIC      R arthroscopy    HX OTHER SURGICAL      x2 L foot    HX UROLOGICAL  04/20/2018    Cystoscopy, TURBT (greater than 5 cm resected) Dr. Addie Hall, [facility].  KNEE ARTHROSCP HARV      left knee    TRANSURETHRAL RESEC BLADDER NECK  08/10/2018       Personal factors and/or comorbidities impacting plan of care: as above    Home Situation  Home Environment: Private residence  # Steps to Enter: 4  Rails to Enter: Yes  One/Two Story Residence: Two story, live on 1st floor  Living Alone: No  Support Systems: Friends \ neighbors  Patient Expects to be Discharged to[de-identified] Private residence  Current DME Used/Available at Home: Cane, straight    EXAMINATION/PRESENTATION/DECISION MAKING:   Critical Behavior:  Neurologic State: Agitated  Orientation Level: Oriented X4  Cognition: Follows commands     Hearing: Auditory  Auditory Impairment: None  Skin:  LLE dressing intact    Range Of Motion:  AROM: Generally decreased, functional - N/T LLE; maintains L knee flexion due to LE pain                       Strength:    Strength: Generally decreased, functional except LLE; as demonstrated for mobility                    Tone & Sensation:   Tone: Normal                              Coordination:  Coordination: Within functional limits  Vision:      Functional Mobility:  Bed Mobility:  Rolling: Additional time;Contact guard assistance; Adaptive equipment  Supine to Sit: Additional time; Moderate assistance; Adaptive equipment  Sit to Supine: Additional time;Contact guard assistance; Adaptive equipment  Scooting:  Additional time;Contact guard assistance  Transfers:      deferred                       Balance:   Sitting: Without support  Sitting - Static: Good (unsupported)  Sitting - Dynamic: Good (unsupported)                         Left Side Weight Bearing: Non-weight bearing                                    Functional Measure:  Barthel Index:    Bathin  Bladder: 5  Bowels: 5  Groomin  Dressin  Feedin  Mobility: 0  Stairs: 0  Toilet Use: 0  Transfer (Bed to Chair and Back): 5  Total: 20/100       The Barthel ADL Index: Guidelines  1. The index should be used as a record of what a patient does, not as a record of what a patient could do. 2. The main aim is to establish degree of independence from any help, physical or verbal, however minor and for whatever reason. 3. The need for supervision renders the patient not independent. 4. A patient's performance should be established using the best available evidence. Asking the patient, friends/relatives and nurses are the usual sources, but direct observation and common sense are also important. However direct testing is not needed. 5. Usually the patient's performance over the preceding 24-48 hours is important, but occasionally longer periods will be relevant. 6. Middle categories imply that the patient supplies over 50 per cent of the effort. 7. Use of aids to be independent is allowed. [place]., Barthel, D.W. (0973). Functional evaluation: the Barthel Index. [facility] (14)2. ISAI Lott, Jermaine West.Temi., [place], [address] [place] [address] (). Measuring the change indisability after inpatient rehabilitation; comparison of the responsiveness of the Barthel Index and Functional [place] Measure. Journal of Neurology, Neurosurgery, and Psychiatry, 66(4), 641-107. DORCAS Jarquin, ELEANOR Hines, & Jessica Romero MArnulfoA. (2004.) Assessment of post-stroke quality of life in cost-effectiveness studies: The usefulness of the Barthel Index and the EuroQoL-5D.  Quality of Life Research, 15, 412-46              Physical Therapy Evaluation Charge Determination History Examination Presentation Decision-Making   HIGH Complexity :3+ comorbidities / personal factors will impact the outcome/ POC  HIGH Complexity : 4+ Standardized tests and measures addressing body structure, function, activity limitation and / or participation in recreation  MEDIUM Complexity : Evolving with changing characteristics  Other outcome measures barthel  MEDIUM      Based on the above components, the patient evaluation is determined to be of the following complexity level: MEDIUM    Pain Rating:  10  Pt pre-medicated; offered repositioning  Activity Tolerance:   limited by pain  Please refer to the flowsheet for vital signs taken during this treatment. After treatment patient left in no apparent distress:   Supine in bed, Call bell within reach, Bed / chair alarm activated and Side rails x 3    COMMUNICATION/EDUCATION:   The patients plan of care was discussed with: Occupational therapist and Registered nurse. Fall prevention education was provided and the patient/caregiver indicated understanding., Patient/family have participated as able in goal setting and plan of care. and Patient/family agree to work toward stated goals and plan of care.     Thank you for this referral.  Brooklyn Horvath, PT, DPT   Time Calculation: 40 mins

## 2020-10-22 NOTE — PROGRESS NOTES
Bedside shift change report given to [address] Khloe Martin (oncoming nurse) by Ricardo Cogan RN (offgoing nurse).  Report included the following information SBAR, Intake/Output, MAR and Recent Results dressing change performed

## 2020-10-22 NOTE — PROGRESS NOTES
Problem: Pressure Injury - Risk of  Goal: *Prevention of pressure injury  Description: Document Dejuan Scale and appropriate interventions in the flowsheet. 10/22/2020 0148 by Odette Hawthorne RN  Outcome: Progressing Towards Goal  Note: Pressure Injury Interventions: Activity Interventions: Increase time out of bed, PT/OT evaluation    Mobility Interventions: PT/OT evaluation    Nutrition Interventions: Offer support with meals,snacks and hydration                  10/22/2020 0143 by Odette Hawthorne RN  Outcome: Progressing Towards Goal  Note: Pressure Injury Interventions: Activity Interventions: Increase time out of bed, PT/OT evaluation    Mobility Interventions: PT/OT evaluation    Nutrition Interventions: Offer support with meals,snacks and hydration                     Problem: Patient Education: Go to Patient Education Activity  Goal: Patient/Family Education  10/22/2020 0148 by Odette Hawthorne RN  Outcome: Progressing Towards Goal  10/22/2020 0143 by Odette Hawthorne RN  Outcome: Progressing Towards Goal     Problem: Falls - Risk of  Goal: *Absence of Falls  Description: Document Veatrice Marker Fall Risk and appropriate interventions in the flowsheet.   10/22/2020 0148 by Odette Hawthorne RN  Outcome: Progressing Towards Goal  Note: Fall Risk Interventions:  Mobility Interventions: PT Consult for mobility concerns         Medication Interventions: Bed/chair exit alarm, Patient to call before getting OOB         History of Falls Interventions: Bed/chair exit alarm      10/22/2020 0143 by Odette Hawthorne RN  Outcome: Progressing Towards Goal  Note: Fall Risk Interventions:  Mobility Interventions: PT Consult for mobility concerns         Medication Interventions: Bed/chair exit alarm, Patient to call before getting OOB         History of Falls Interventions: Bed/chair exit alarm         Problem: Patient Education: Go to Patient Education Activity  Goal: Patient/Family Education  10/22/2020 0148 by Lizett Leonard RN  Outcome: Progressing Towards Goal  10/22/2020 0143 by Lizett Leonard RN  Outcome: Progressing Towards Goal  Note: Pt verbalizes understanding      Problem: Pain  Goal: *Control of Pain  10/22/2020 0148 by Lizett Leonard RN  Outcome: Progressing Towards Goal  10/22/2020 0143 by Lizett Leonard RN  Outcome: Progressing Towards Goal  Note: Frequent calls for pain medication, physician aware   Goal: *PALLIATIVE CARE:  Alleviation of Pain  10/22/2020 0148 by Lizett Leonard RN  Outcome: Progressing Towards Goal  10/22/2020 0143 by Lizett Leonard RN  Outcome: Progressing Towards Goal

## 2020-10-22 NOTE — PROGRESS NOTES
Events noted  Discussed with Dr Jia Garcia. I dont have much experience with pyoderma but dont think this foot is salvagable regardless. Again offered an attempt at [facility] that he had multiple family members to discuss this with  Will be available when he decides.

## 2020-10-22 NOTE — PROGRESS NOTES
[facility] Disease Specialists Progress Note           Cody Merino DO    [phone] Office  [phone]  Fax    10/22/2020      Assessment & Plan:   · Chronic left foot wound. The patient just completed a 2-week course of ciprofloxacin, amoxicillin, and minocycline less than 1 week ago. Patient refused MRI and EUGENE yesterday. Vascular surgery recommended BKA. Per discussion with podiatry concern is been raised for pyoderma gangrenosum in the past.  Consider dermatology evaluation versus transfer to tertiary center such as [facility] or [facility]. The wound did not appear to be acutely infected at the time of examination yesterday. I am concerned that unnecessary antibiotics are going to lead to progressively more resistant organisms. Monitor off antibiotics for now. Continue local wound care. · Vancomycin and sulfa allergies. Ciprofloxacin is listed as an allergy however he just finished a course of this antibiotic  · Leukocytosis. He remains afebrile. Follow trend. Blood cultures NGSF. Subjective:     No complaints    Objective:     Vitals:   Visit Vitals  /67 (BP 1 Location: Right arm, BP Patient Position: At rest)   Pulse 97   Temp 97.4 °F (36.3 °C)   Resp 18   Ht 6' 1\" (1.854 m)   Wt 242 lb (109.8 kg)   SpO2 96%   BMI 31.93 kg/m²        Tmax:  Temp (24hrs), Av.2 °F (36.8 °C), Min:97.4 °F (36.3 °C), Max:98.9 °F (37.2 °C)      Exam:   Patient is intubated:  no    Physical Examination:   General:  Alert, cooperative, no distress   Head:  Normocephalic, atraumatic. Eyes:  Conjunctivae clear   Neck: Supple       Lungs:   No distress. Chest wall:     Heart:     Abdomen:   Non-distended   Extremities: Moves all. Skin:  Leg dressed   Neurologic: CNII-XII intact. Normal strength     Labs:        No lab exists for component: ITNL   No results for input(s): CPK, CKMB, TROIQ in the last 72 hours.   Recent Labs     10/22/20  0545 10/21/20  0007    135*   K 4.1 3.9    103   CO2 28 26   BUN 9 12   CREA 0.62* 0.72   GLU 81 95   MG 2.0  --    ALB 3.0* 3.7   WBC 17.2* 18.0*   HGB 9.8* 10.3*   HCT 31.2* 33.3*    480*     No results for input(s): INR, PTP, APTT, INREXT in the last 72 hours. Needs: urine analysis, urine sodium, protein and creatinine  No results found for: VIVEK, CREAU      Cultures:     Lab Results   Component Value Date/Time    Specimen Description: BLOOD 02/04/2010 10:55 PM    Specimen Description: BLOOD 02/12/2009 11:30 PM     Lab Results   Component Value Date/Time    Culture result: NO GROWTH 1 DAY 10/21/2020 02:51 AM    Culture result: NO GROWTH 1 DAY 10/21/2020 02:51 AM    Culture result: HEAVY DIPHTHEROIDS (A) 05/30/2019 03:00 PM    Culture result: (A) 05/30/2019 03:00 PM     LIGHT STENOTROPHOMONAS (Yuliana Midway.) MALTOPHILIA CLSI GUIDELINES DO NOT RECOMMEND REPORTING SUSCEPTIBILITIES FOR S. MALTOPHILIA. TRIMETHOPRIM/SULFAMETHOXAZOLE IS THE DRUG OF CHOICE.     Culture result: FEW PROTEUS MIRABILIS (A) 05/30/2019 03:00 PM    Culture result: FEW KLEBSIELLA PNEUMONIAE (A) 05/30/2019 03:00 PM       Radiology:     Medications       Current Facility-Administered Medications   Medication Dose Route Frequency Last Dose    oxyCODONE IR (ROXICODONE) tablet 15 mg  15 mg Oral Q4H PRN      HYDROmorphone (PF) (DILAUDID) injection 2 mg  2 mg IntraVENous Q3H PRN      allopurinoL (ZYLOPRIM) tablet 100 mg  100 mg Oral DAILY 100 mg at 10/22/20 0925    pantoprazole (PROTONIX) tablet 40 mg  40 mg Oral ACB 40 mg at 10/22/20 0641    tamsulosin (FLOMAX) capsule 0.4 mg  0.4 mg Oral DAILY 0.4 mg at 10/22/20 0925    methadone (DOLOPHINE) tablet 140 mg  140 mg Oral DAILY 140 mg at 10/22/20 0924    nicotine (NICODERM CQ) 21 mg/24 hr patch 1 Patch  1 Patch TransDERmal DAILY      lisinopriL (PRINIVIL, ZESTRIL) tablet 20 mg  20 mg Oral DAILY 20 mg at 10/22/20 0925    senna-docusate (PERICOLACE) 8.6-50 mg per tablet 1 Tab  1 Tab Oral DAILY 1 Tab at 10/22/20 3281    sodium chloride (NS) flush 5-40 mL  5-40 mL IntraVENous Q8H 10 mL at 10/22/20 1400    sodium chloride (NS) flush 5-40 mL  5-40 mL IntraVENous PRN      acetaminophen (TYLENOL) tablet 650 mg  650 mg Oral Q6H PRN      Or    acetaminophen (TYLENOL) suppository 650 mg  650 mg Rectal Q6H PRN      polyethylene glycol (MIRALAX) packet 17 g  17 g Oral DAILY PRN      ondansetron (ZOFRAN ODT) tablet 4 mg  4 mg Oral Q8H PRN 4 mg at 10/22/20 0256    Or    ondansetron (ZOFRAN) injection 4 mg  4 mg IntraVENous Q6H PRN      famotidine (PEPCID) tablet 20 mg  20 mg Oral BID 20 mg at 10/22/20 0925    enoxaparin (LOVENOX) injection 40 mg  40 mg SubCUTAneous DAILY 40 mg at 10/22/20 0925    gabapentin (NEURONTIN) capsule 100 mg  100 mg Oral  mg at 10/22/20 0925    colchicine tablet 0.6 mg  0.6 mg Oral DAILY PRN 0.6 mg at 10/21/20 2338    levothyroxine (SYNTHROID) tablet 112 mcg  112 mcg Oral  mcg at 10/22/20 0641    lidocaine (XYLOCAINE) 2 % jelly   Mucous Membrane PRN      aspirin chewable tablet 81 mg  81 mg Oral DAILY 81 mg at 10/22/20 5732    nystatin (MYCOSTATIN) 100,000 unit/gram powder   Topical BID             Case discussed with:      Carmen Gonsales DO

## 2020-10-22 NOTE — PROGRESS NOTES
Gigi Marino [facility] [address]  [address] [facility], [address] [place] [address]  [phone]      Medical Progress Note      NAME: Andrea Alatorre   :  1965  MRM:  [id number]    Date/Time of service: 10/22/2020  12:43 PM       Subjective:     Chief Complaint:  Patient was personally seen and examined by me during this time period. Chart reviewed. C/o severe leg pain. Open to amputation if it's last resort        Objective:       Vitals:       Last 24hrs VS reviewed since prior progress note. Most recent are:    Visit Vitals  /67 (BP 1 Location: Right arm, BP Patient Position: At rest)   Pulse 97   Temp 97.4 °F (36.3 °C)   Resp 18   Ht 6' 1\" (1.854 m)   Wt 109.8 kg (242 lb)   SpO2 96%   BMI 31.93 kg/m²     SpO2 Readings from Last 6 Encounters:   10/22/20 96%   10/15/19 99%   19 96%   19 95%   19 99%   05/10/19 92%            Intake/Output Summary (Last 24 hours) at 10/22/2020 1243  Last data filed at 10/22/2020 0600  Gross per 24 hour   Intake --   Output 1050 ml   Net -1050 ml        Exam:     Physical Exam:    Gen:  Disheveled, ill-appearing, obese, mild distress  HEENT:  Pink conjunctivae, PERRL, hearing intact to voice, moist mucous membranes  Neck:  Supple, without masses, thyroid non-tender  Resp:  No accessory muscle use, clear breath sounds without wheezes rales or rhonchi  Card:  No murmurs, normal S1, S2 without thrills, 1+ edema  Abd:  Soft, non-tender, non-distended, normoactive bowel sounds are present  Musc:  No cyanosis or clubbing  Skin:  Large open wound from L ankle to mid leg with some necrosis.   Some area with good granulation tissue  Neuro:  Cranial nerves 3-12 are grossly intact, chronic L hemiparesis, follows commands appropriately  Psych:  poor insight, oriented to person, place and time, alert    Medications Reviewed: (see below)    Lab Data Reviewed: (see below)    ______________________________________________________________________    Medications: Current Facility-Administered Medications   Medication Dose Route Frequency    HYDROmorphone (PF) (DILAUDID) injection 3 mg  3 mg IntraVENous Q4H PRN    oxyCODONE IR (ROXICODONE) tablet 15 mg  15 mg Oral Q4H PRN    allopurinoL (ZYLOPRIM) tablet 100 mg  100 mg Oral DAILY    pantoprazole (PROTONIX) tablet 40 mg  40 mg Oral ACB    tamsulosin (FLOMAX) capsule 0.4 mg  0.4 mg Oral DAILY    methadone (DOLOPHINE) tablet 140 mg  140 mg Oral DAILY    nicotine (NICODERM CQ) 21 mg/24 hr patch 1 Patch  1 Patch TransDERmal DAILY    lisinopriL (PRINIVIL, ZESTRIL) tablet 20 mg  20 mg Oral DAILY    senna-docusate (PERICOLACE) 8.6-50 mg per tablet 1 Tab  1 Tab Oral DAILY    sodium chloride (NS) flush 5-40 mL  5-40 mL IntraVENous Q8H    sodium chloride (NS) flush 5-40 mL  5-40 mL IntraVENous PRN    acetaminophen (TYLENOL) tablet 650 mg  650 mg Oral Q6H PRN    Or    acetaminophen (TYLENOL) suppository 650 mg  650 mg Rectal Q6H PRN    polyethylene glycol (MIRALAX) packet 17 g  17 g Oral DAILY PRN    ondansetron (ZOFRAN ODT) tablet 4 mg  4 mg Oral Q8H PRN    Or    ondansetron (ZOFRAN) injection 4 mg  4 mg IntraVENous Q6H PRN    famotidine (PEPCID) tablet 20 mg  20 mg Oral BID    enoxaparin (LOVENOX) injection 40 mg  40 mg SubCUTAneous DAILY    gabapentin (NEURONTIN) capsule 100 mg  100 mg Oral TID    colchicine tablet 0.6 mg  0.6 mg Oral DAILY PRN    levothyroxine (SYNTHROID) tablet 112 mcg  112 mcg Oral ACB    lidocaine (XYLOCAINE) 2 % jelly   Mucous Membrane PRN    aspirin chewable tablet 81 mg  81 mg Oral DAILY    nystatin (MYCOSTATIN) 100,000 unit/gram powder   Topical BID          Lab Review:     Recent Labs     10/22/20  0545 10/21/20  0007   WBC 17.2* 18.0*   HGB 9.8* 10.3*   HCT 31.2* 33.3*    480*     Recent Labs     10/22/20  0545 10/21/20  0007    135*   K 4.1 3.9    103   CO2 28 26   GLU 81 95   BUN 9 12   CREA 0.62* 0.72   CA 8.2* 8.5   MG 2.0  --    ALB 3.0* 3.7   TBILI 0.6 0.2 ALT 22 25     Lab Results   Component Value Date/Time    Glucose (POC) 150 (H) 03/19/2018 11:45 AM    Glucose (POC) 123 (H) 03/18/2018 08:55 PM    Glucose (POC) 96 03/16/2018 04:58 PM    Glucose (POC) 116 (H) 12/12/2012 12:42 PM    Glucose (POC) 99 12/11/2012 11:27 PM          Assessment / Plan:     54 yo hx of HTN, CVA w/ L hemiparesis, bladder CA, depression, chronic pain, chronic L foot/leg infection, presented w/ worsening infection    1) Acute on chronic L foot/leg infection: has been to multiple facilities. Plastics surg at [facility] recommended amputation. Prior skin biopsy concerning for pyoderma. Will hold on IV abx. ID, vascular surg, and podiatry following. Cont would care per wound care team.  Patient is willing to get an amputation if appropriate. Consider transfer to tertiary care center if wound is pyoderma    2) Acute on Chronic pain/depression/anxiety: cont methadone, neurontin. Use oxycodone prn mod pain, use IV dilaudid prn severe pain    3) Hx of hemorrhagic CVA w/ L hemiparesis: cont ASA, PT/OT    4) HTN: cont lisinopril    5) Hx of bladder CA: f/u with Urologist in [facility].   Cont flomax    6) Hypothyroid: cont synthroid     Total time spent with patient: 36 Minutes **I personally saw and examined the patient during this time period**                 Care Plan discussed with: Patient, nursing, podiatry, Vascular     Discussed:  Care Plan    Prophylaxis:  Lovenox    Disposition:  SNF/LTC           ___________________________________________________    Attending Physician: Arleen Junior MD

## 2020-10-22 NOTE — PROGRESS NOTES
Problem: Self Care Deficits Care Plan (Adult)  Goal: *Acute Goals and Plan of Care (Insert Text)  Description: FUNCTIONAL STATUS PRIOR TO ADMISSION: 1 month ago pt was ambulatory using cane and performed functional mobility without assistance. He was admitted to outside hospital where he reports not mobilizing out of bed x 1 month and sustaining additional injury to LLE at that facility. HOME SUPPORT PRIOR TO ADMISSION: The patient lived with Desire Khalil to whom he refers as his \". \"    Occupational Therapy Goals  Initiated 10/22/2020  1. Patient will perform upper body dressing, using noe techniques, with supervision/set-up within 7 day(s). 2.  Patient will perform lower body dressing with minimal assistance/contact guard assist within 7 day(s). 3.  Patient will perform toilet transfers, to [facility],  with maximal assistance within 7 day(s). 4.  Patient will perform all aspects of toileting with maximal assistance within 7 day(s). Outcome: Progressing Towards Goal   OCCUPATIONAL THERAPY EVALUATION  Patient: Alla Varela (21 y.o. male)  Date: 10/22/2020  Primary Diagnosis: Foot ulcer ([address] [S11.923]  Left foot infection [L08.9]        Precautions:   Fall, Bed Alarm, Skin    ASSESSMENT  Based on the objective data described below, the patient presents with hospital admission secondary to left foot ulcer and infection. Patient reports having been admitted x 30 days to outside hospital with IV antibiotics. Within that time, he reports no therapy, and prolonged time in bed. Patient is agreeable to sitting EOB today and moves with moderate assistance. Patient with heavy reliance on bed rails but also demonstrates good R side strength, but L side with increased weakness from prior CVA. Patient able to sit at EOB x 5 minutes to allow bed linen change. Patient requesting UE bathing at back due to irritation and itching.  Patient requesting return to supine secondary to increased pain to RLE.    Current Level of Function Impacting Discharge (ADLs/self-care): total assist for transfers, up to total assist for ADLs    Functional Outcome Measure: The patient scored 20/100 on the Barthel Index  outcome measure. Other factors to consider for discharge: lives with casemanager? Patient will benefit from skilled therapy intervention to address the above noted impairments. PLAN :  Recommendations and Planned Interventions: self care training, functional mobility training, therapeutic exercise, balance training, therapeutic activities, endurance activities, neuromuscular re-education, patient education, home safety training, and family training/education    Frequency/Duration: Patient will be followed by occupational therapy 5 times a week to address goals. Recommendation for discharge: (in order for the patient to meet his/her long term goals)  Therapy up to 5 days/week in SNF setting    This discharge recommendation:  Has not yet been discussed the attending provider and/or case management    IF patient discharges home will need the following DME: TBD       SUBJECTIVE:   Patient stated Its hurting.     OBJECTIVE DATA SUMMARY:   HISTORY:   Past Medical History:   Diagnosis Date    Aneurysm ([address]     (with stroke)    Bladder tumor     Cancer ([address]     bladder CA    Chronic pain     Family history of bladder cancer     GERD (gastroesophageal reflux disease)     Gout     History of vascular access device 04/25/2019    [facility] VAT 4 FR MIDLINE R Cephalic Limited access    History of vascular access device 04/26/2019    4 fr Midline right Cephalic midline access    Hypercholesterolemia     Hypertension     Lesion of bladder     Seizures ([address]     Stroke ([address] 2006    left sided weakness    Thromboembolus ([address]     left leg    Thyroid disease     TIA (transient ischemic attack) 2012     Past Surgical History:   Procedure Laterality Date    HX ORTHOPAEDIC      R arthroscopy    HX OTHER SURGICAL x2 L foot    HX UROLOGICAL  04/20/2018    Cystoscopy, TURBT (greater than 5 cm resected) Dr. Mik Lee, [facility]. KNEE ARTHROSCP HARV      left knee    TRANSURETHRAL RESEC BLADDER NECK  08/10/2018       Expanded or extensive additional review of patient history:     Home Situation  Home Environment: Private residence  # Steps to Enter: 4  Rails to Enter: Yes  One/Two Story Residence: Two story, live on 1st floor  Living Alone: No  Support Systems: Friends \ neighbors  Patient Expects to be Discharged to[de-identified] Private residence  Current DME Used/Available at Home: Cane, straight    Hand dominance: Right    EXAMINATION OF PERFORMANCE DEFICITS:  Cognitive/Behavioral Status:  Neurologic State: Agitated  Orientation Level: Oriented X4  Cognition: Follows commands             Skin: intact as seen    Edema: none noted     Hearing: Auditory  Auditory Impairment: None    Vision/Perceptual:                                     Range of Motion:  AROM: Generally decreased, functional                         Strength:  Strength: Generally decreased, functional                Coordination:  Coordination: Within functional limits  Fine Motor Skills-Upper: Right Intact; Left Impaired    Gross Motor Skills-Upper: Right Intact; Left Impaired    Tone & Sensation:  Tone: Normal                         Balance:  Sitting: Without support  Sitting - Static: Good (unsupported)  Sitting - Dynamic: Good (unsupported)    Functional Mobility and Transfers for ADLs:  Bed Mobility:  Rolling: Additional time;Contact guard assistance; Adaptive equipment  Supine to Sit: Additional time; Moderate assistance; Adaptive equipment  Sit to Supine: Additional time;Contact guard assistance; Adaptive equipment  Scooting: Additional time;Contact guard assistance    Transfers:       ADL Assessment:  Feeding: Setup    Oral Facial Hygiene/Grooming: Minimum assistance    Bathing: Moderate assistance    Upper Body Dressing:  Moderate assistance    Lower Body Dressing: Maximum assistance    Toileting: Total assistance                ADL Intervention and task modifications:                                          Therapeutic Exercise:   Functional Measure:  Barthel Index:    Bathin  Bladder: 5  Bowels: 5  Groomin  Dressin  Feedin  Mobility: 0  Stairs: 0  Toilet Use: 0  Transfer (Bed to Chair and Back): 5  Total: 20/100        The Barthel ADL Index: Guidelines  1. The index should be used as a record of what a patient does, not as a record of what a patient could do. 2. The main aim is to establish degree of independence from any help, physical or verbal, however minor and for whatever reason. 3. The need for supervision renders the patient not independent. 4. A patient's performance should be established using the best available evidence. Asking the patient, friends/relatives and nurses are the usual sources, but direct observation and common sense are also important. However direct testing is not needed. 5. Usually the patient's performance over the preceding 24-48 hours is important, but occasionally longer periods will be relevant. 6. Middle categories imply that the patient supplies over 50 per cent of the effort. 7. Use of aids to be independent is allowed. Jaswant Lima., Barthel, D.W. (8013). Functional evaluation: the Barthel Index. [facility] (14)2. Zakiya Verduzco balwinder ISAI Thompson, Martin Peace, Kaykay Valentino., Akosua Sandersny, [address] [place] [address] (). Measuring the change indisability after inpatient rehabilitation; comparison of the responsiveness of the Barthel Index and Functional Golden Measure. Journal of Neurology, Neurosurgery, and Psychiatry, 66(4), 284-832. Priscila Corcoran NALPHONSO.SILKE, ELEANOR Hines, & Joey Anderson MArnulfoA. (2004.) Assessment of post-stroke quality of life in cost-effectiveness studies: The usefulness of the Barthel Index and the EuroQoL-5D.  Quality of Life Research, 15, 282-70         Occupational Therapy Evaluation Charge Determination History Examination Decision-Making   LOW Complexity : Brief history review  LOW Complexity : 1-3 performance deficits relating to physical, cognitive , or psychosocial skils that result in activity limitations and / or participation restrictions  LOW Complexity : No comorbidities that affect functional and no verbal or physical assistance needed to complete eval tasks       Based on the above components, the patient evaluation is determined to be of the following complexity level: LOW   Pain Rating:      Activity Tolerance:   Fair and complains of pain to LLE  Please refer to the flowsheet for vital signs taken during this treatment. After treatment patient left in no apparent distress:    Supine in bed, Call bell within reach, Caregiver / family present, and Side rails x 3    COMMUNICATION/EDUCATION:   The patients plan of care was discussed with: Physical therapist and Registered nurse. Home safety education was provided and the patient/caregiver indicated understanding., Patient/family have participated as able in goal setting and plan of care. , and Patient/family agree to work toward stated goals and plan of care. This patients plan of care is appropriate for delegation to [facility].     Thank you for this referral.  Simón Cid, OTR/L  Time Calculation: 24 mins

## 2020-10-22 NOTE — CONSULTS
The [address] [place] [address] Podiatric Surgery  H & P Note    Date: October 22, 2020  Patient: Samantha Purdy  MR #: [id number]  [facility] #: [id number]  Attending/Admitting Physician: Dr Layo Borrego MD    Reason for Consult:  Dr Layo Borrego MD asked me to see Samantha Purdy for evaluation and treatment of left foot and leg wounds    History of Present Illness:  Patient is a 53 y.o. male presents with longstanding ulceration and venous insufficiency. Has had exhaustive management attempts for limb salvage but continues to have inconsistency with care. Afebrile. ROS:   Constitutional: Negative for fever, chills, weight loss. Positive for fatigue  HENT: Negative for nosebleeds and congestion. Eyes: Negative for blurred vision and double vision. Respiratory: Negative for cough, shortness of breath and wheezing. Cardiovascular: Negative for chest pain, palpitations, claudication. Positive for leg swelling  Gastrointestinal: Negative for heartburn, nausea, vomiting, abdominal pain and diarrhea. Genitourinary: Negative for dysuria and urgency. Musculoskeletal: positive for weakness and pain  Skin: positive for wounds  Neurological: Negative for dizziness. Positive for weakness  Endo/Heme/Allergies: Negative for environmental allergies. Does not bruise/bleed easily. 2  Psychiatric/Behavioral: positive for depression    Blood pressure 100/67, pulse 97, temperature 97.4 °F (36.3 °C), resp. rate 18, height 6' 1\" (1.854 m), weight 109.8 kg (242 lb), SpO2 96 %.     Intake/Output Summary (Last 24 hours) at 10/22/2020 1214  Last data filed at 10/22/2020 0600  Gross per 24 hour   Intake --   Output 1050 ml   Net -1050 ml         Medical History:  Past Medical History:   Diagnosis Date    Aneurysm ([address]     (with stroke)    Bladder tumor     Cancer ([facility]     bladder CA    Chronic pain     Family history of bladder cancer     GERD (gastroesophageal reflux disease)     Gout     History of vascular access device 04/25/2019 [facility] VAT 4 FR MIDLINE R Cephalic Limited access    History of vascular access device 04/26/2019    4 fr Midline right Cephalic midline access    Hypercholesterolemia     Hypertension     Lesion of bladder     Seizures ([facility]     Stroke [facility]) 2006    left sided weakness    Thromboembolus ([facility]     left leg    Thyroid disease     TIA (transient ischemic attack) 2012     Past Surgical History:   Procedure Laterality Date    HX ORTHOPAEDIC      R arthroscopy    HX OTHER SURGICAL      x2 L foot    HX UROLOGICAL  04/20/2018    Cystoscopy, TURBT (greater than 5 cm resected) Dr. Bimal Etienne, [facility].     KNEE ARTHROSCP HARV      left knee    TRANSURETHRAL RESEC BLADDER NECK  08/10/2018     [unfilled]  Allergies   Allergen Reactions    Sulfamethoxazole-Trimethoprim Rash     L eye and L hand    Ciprofloxacin Hives     Per pcp records    Codeine Hives     Tolerates dilaudid, oxycodone    Cymbalta [Duloxetine] Other (comments)     Confusion and memory loss    Lyrica [Pregabalin] Hives    Sulfa (Sulfonamide Antibiotics) Rash    Ultram [Tramadol] Hives    Vancomycin Shortness of Breath     Family History   Problem Relation Age of Onset    Diabetes Father     Lung Disease Father     Diabetes Sister     Diabetes Brother     Cancer Paternal Grandfather         Bladder     Social History     Tobacco Use   Smoking Status Current Every Day Smoker    Packs/day: 0.50   Smokeless Tobacco Never Used   Tobacco Comment    instructed not to smoke 24 hrs prior surgery     Social History     Substance and Sexual Activity   Drug Use Yes    Types: Prescription     Social History     Substance and Sexual Activity   Alcohol Use Yes    Alcohol/week: 6.0 standard drinks    Types: 6 Cans of beer per week    Comment: occasional       Labs:  Lab Results   Component Value Date/Time    WBC 17.2 (H) 10/22/2020 05:45 AM    HGB 9.8 (L) 10/22/2020 05:45 AM    HCT 31.2 (L) 10/22/2020 05:45 AM    MCV 83.6 10/22/2020 05:45 AM    PLATELET 397 10/22/2020 05:45 AM     Lab Results   Component Value Date/Time    Sodium 136 10/22/2020 05:45 AM    Potassium 4.1 10/22/2020 05:45 AM    Chloride 104 10/22/2020 05:45 AM    CO2 28 10/22/2020 05:45 AM    Phosphorus 4.0 06/09/2019 02:23 AM    BUN 9 10/22/2020 05:45 AM    Calcium 8.2 (L) 10/22/2020 05:45 AM      Lab Results   Component Value Date/Time    Glucose 81 10/22/2020 05:45 AM     Lab Results   Component Value Date/Time    INR 1.1 12/21/2017 11:28 AM    No components found for: PTT  Recent Labs     10/22/20  0545 10/21/20  0007   GLU 81 95       Physical Exam:  General appearance: alert, cooperative, no distress, appears stated age     Lower Extremity Exam:  Vascular:    Dorsalis Pedis Pulse: present  Posterior Tibialis Pulse: present  Popliteal and Femoral Pulses: present  Skin Temp: warm to warm right and left lower extremities legs to toes  Extremity Edema: +2 pitting with chronic hemosiderin deposition  Varicosities: present     Neurological:  Deep Tendon Reflexes of Achilles and Patellar: diminished but intact right. Diminished left  Epicritic and Protective Sensations: absent     Deep Pain Response: present     Dermatological:  Toenails: mycotic 1-5 right and left foot  Ulceration:  Ulceration present to dorsal left foot   Measures 6cm x 6cm x0.2cm  Fibrotic and slough wound bed  Erythema noted ludwig-wound.     Ulceration noted to medial left heel extending up the medial leg  Fibrotic wound bed with slough and malodor noted     Rash: absent     Musculoskeletal:  Gait and Station: antalgic and foot drop    Impression:  1. Cellulitis left  2. Venous stasis ulcerations left, worsening  3. ? pyoderma  4. PVD    Plan:   Reviewed prior admissions including other facilities. Patient has worsening of the wound and chronic pain. Unlikely to heal the ulcerations. I have discussed with Dr. Annel Hopson and feel the best course would be transtibial amputation to remove the nonviable distal extremity.  Sign off from podiatric surgery standpoint. The nature of the finding, probable diagnosis and likely treatment was thoroughly discussed with the patient. The options, risks, complications, alternative treatment as well as some of the differential diagnosis was discussed. The patient was thoroughly informed and all questions were answered. the patient indicated understanding and satisfaction with the discussion. Phyllis Kaur DPM FACFAS [facility]  Triple Board Certified Foot & Ankle Specialist  [phone] cell  10/22/2020  12:14 PM

## 2020-10-23 LAB
ANION GAP SERPL CALC-SCNC: 7 MMOL/L (ref 5–15)
BUN SERPL-MCNC: 14 MG/DL (ref 6–20)
BUN/CREAT SERPL: 12 (ref 12–20)
CALCIUM SERPL-MCNC: 8.3 MG/DL (ref 8.5–10.1)
CHLORIDE SERPL-SCNC: 104 MMOL/L (ref 97–108)
CO2 SERPL-SCNC: 25 MMOL/L (ref 21–32)
CREAT SERPL-MCNC: 1.14 MG/DL (ref 0.7–1.3)
ERYTHROCYTE [DISTWIDTH] IN BLOOD BY AUTOMATED COUNT: 16.1 % (ref 11.5–14.5)
GLUCOSE SERPL-MCNC: 121 MG/DL (ref 65–100)
HCT VFR BLD AUTO: 29.5 % (ref 36.6–50.3)
HGB BLD-MCNC: 9.1 G/DL (ref 12.1–17)
MAGNESIUM SERPL-MCNC: 1.8 MG/DL (ref 1.6–2.4)
MCH RBC QN AUTO: 25.8 PG (ref 26–34)
MCHC RBC AUTO-ENTMCNC: 30.8 G/DL (ref 30–36.5)
MCV RBC AUTO: 83.6 FL (ref 80–99)
NRBC # BLD: 0 K/UL (ref 0–0.01)
NRBC BLD-RTO: 0 PER 100 WBC
PHOSPHATE SERPL-MCNC: 4.4 MG/DL (ref 2.6–4.7)
PLATELET # BLD AUTO: 325 K/UL (ref 150–400)
PMV BLD AUTO: 9.9 FL (ref 8.9–12.9)
POTASSIUM SERPL-SCNC: 4 MMOL/L (ref 3.5–5.1)
RBC # BLD AUTO: 3.53 M/UL (ref 4.1–5.7)
SODIUM SERPL-SCNC: 136 MMOL/L (ref 136–145)
WBC # BLD AUTO: 13 K/UL (ref 4.1–11.1)

## 2020-10-23 PROCEDURE — 74011250636 HC RX REV CODE- 250/636: Performed by: INTERNAL MEDICINE

## 2020-10-23 PROCEDURE — 74011250637 HC RX REV CODE- 250/637: Performed by: INTERNAL MEDICINE

## 2020-10-23 PROCEDURE — 85027 COMPLETE CBC AUTOMATED: CPT

## 2020-10-23 PROCEDURE — 36415 COLL VENOUS BLD VENIPUNCTURE: CPT

## 2020-10-23 PROCEDURE — 97535 SELF CARE MNGMENT TRAINING: CPT

## 2020-10-23 PROCEDURE — 84100 ASSAY OF PHOSPHORUS: CPT

## 2020-10-23 PROCEDURE — 77030012930 HC DRSG ANTIMIC COLO -B

## 2020-10-23 PROCEDURE — 97530 THERAPEUTIC ACTIVITIES: CPT

## 2020-10-23 PROCEDURE — 83735 ASSAY OF MAGNESIUM: CPT

## 2020-10-23 PROCEDURE — 2709999900 HC NON-CHARGEABLE SUPPLY

## 2020-10-23 PROCEDURE — 99232 SBSQ HOSP IP/OBS MODERATE 35: CPT | Performed by: INTERNAL MEDICINE

## 2020-10-23 PROCEDURE — 74011000250 HC RX REV CODE- 250: Performed by: INTERNAL MEDICINE

## 2020-10-23 PROCEDURE — 80048 BASIC METABOLIC PNL TOTAL CA: CPT

## 2020-10-23 PROCEDURE — 65270000029 HC RM PRIVATE

## 2020-10-23 RX ORDER — DIPHENHYDRAMINE HCL 25 MG
25 CAPSULE ORAL
Status: DISCONTINUED | OUTPATIENT
Start: 2020-10-23 | End: 2020-10-31 | Stop reason: HOSPADM

## 2020-10-23 RX ADMIN — PANTOPRAZOLE SODIUM 40 MG: 40 TABLET, DELAYED RELEASE ORAL at 06:01

## 2020-10-23 RX ADMIN — TAMSULOSIN HYDROCHLORIDE 0.4 MG: 0.4 CAPSULE ORAL at 07:54

## 2020-10-23 RX ADMIN — OXYCODONE 15 MG: 5 TABLET ORAL at 21:04

## 2020-10-23 RX ADMIN — FAMOTIDINE 20 MG: 20 TABLET, FILM COATED ORAL at 07:55

## 2020-10-23 RX ADMIN — Medication 10 ML: at 15:49

## 2020-10-23 RX ADMIN — LIDOCAINE HYDROCHLORIDE: 20 JELLY TOPICAL at 15:49

## 2020-10-23 RX ADMIN — NYSTATIN: 100000 POWDER TOPICAL at 09:49

## 2020-10-23 RX ADMIN — HYDROMORPHONE HYDROCHLORIDE 2 MG: 2 INJECTION, SOLUTION INTRAMUSCULAR; INTRAVENOUS; SUBCUTANEOUS at 18:39

## 2020-10-23 RX ADMIN — ALLOPURINOL 100 MG: 100 TABLET ORAL at 07:55

## 2020-10-23 RX ADMIN — ASPIRIN 81 MG 81 MG: 81 TABLET ORAL at 07:55

## 2020-10-23 RX ADMIN — OXYCODONE 15 MG: 5 TABLET ORAL at 13:50

## 2020-10-23 RX ADMIN — Medication 10 ML: at 20:59

## 2020-10-23 RX ADMIN — NYSTATIN: 100000 POWDER TOPICAL at 18:39

## 2020-10-23 RX ADMIN — HYDROMORPHONE HYDROCHLORIDE 2 MG: 2 INJECTION, SOLUTION INTRAMUSCULAR; INTRAVENOUS; SUBCUTANEOUS at 05:03

## 2020-10-23 RX ADMIN — Medication 10 ML: at 06:00

## 2020-10-23 RX ADMIN — LEVOTHYROXINE SODIUM 112 MCG: 0.11 TABLET ORAL at 06:01

## 2020-10-23 RX ADMIN — HYDROMORPHONE HYDROCHLORIDE 2 MG: 2 INJECTION, SOLUTION INTRAMUSCULAR; INTRAVENOUS; SUBCUTANEOUS at 15:49

## 2020-10-23 RX ADMIN — OXYCODONE 15 MG: 5 TABLET ORAL at 02:24

## 2020-10-23 RX ADMIN — DOCUSATE SODIUM 50MG AND SENNOSIDES 8.6MG 1 TABLET: 8.6; 5 TABLET, FILM COATED ORAL at 07:54

## 2020-10-23 RX ADMIN — FAMOTIDINE 20 MG: 20 TABLET, FILM COATED ORAL at 18:39

## 2020-10-23 RX ADMIN — HYDROMORPHONE HYDROCHLORIDE 2 MG: 2 INJECTION, SOLUTION INTRAMUSCULAR; INTRAVENOUS; SUBCUTANEOUS at 12:02

## 2020-10-23 RX ADMIN — HYDROMORPHONE HYDROCHLORIDE 2 MG: 2 INJECTION, SOLUTION INTRAMUSCULAR; INTRAVENOUS; SUBCUTANEOUS at 22:17

## 2020-10-23 RX ADMIN — METHADONE HYDROCHLORIDE 140 MG: 10 TABLET ORAL at 07:54

## 2020-10-23 RX ADMIN — ENOXAPARIN SODIUM 40 MG: 40 INJECTION SUBCUTANEOUS at 07:51

## 2020-10-23 RX ADMIN — DIPHENHYDRAMINE HYDROCHLORIDE 25 MG: 25 CAPSULE ORAL at 09:48

## 2020-10-23 RX ADMIN — OXYCODONE 15 MG: 5 TABLET ORAL at 07:54

## 2020-10-23 RX ADMIN — ACETAMINOPHEN 650 MG: 325 TABLET ORAL at 07:54

## 2020-10-23 NOTE — PROGRESS NOTES
[facility] Progress Note           Basilio Vasquez DO    [phone] Office  [phone]  Fax    10/23/2020      Assessment & Plan:   · Chronic left foot wound. The patient just completed a 2-week course of ciprofloxacin, amoxicillin, and minocycline less than 1 week ago. Patient refused MRI and EUGENE   Vascular surgery recommended BKA. Per discussion with podiatry concern is been raised for pyoderma gangrenosum in the past.  Recommend outpatient dermatology evaluation versus transfer to tertiary center such as [facility] or [facility]. Patient denying transfer to [facility]. Apparently all beds are full at [facility] [place]. Monitor off antibiotics. I am concerned that unnecessary antibiotics are going to lead to progressively more resistant organisms. Continue local wound care   · Vancomycin and sulfa allergies. Ciprofloxacin is listed as an allergy however he just finished a course of this antibiotic  · Leukocytosis. He remains afebrile. WBC down to 13,000 today without antibiotics. Doubt infectious etiology. Follow trend. Blood cultures NGSF. Subjective:     Complaining of leg pain    Objective:     Vitals:   Visit Vitals  /69 (BP 1 Location: Right arm, BP Patient Position: Sitting)   Pulse 69   Temp 98 °F (36.7 °C)   Resp 18   Ht 6' 1\" (1.854 m)   Wt 242 lb (109.8 kg)   SpO2 95%   BMI 31.93 kg/m²        Tmax:  Temp (24hrs), Av.3 °F (36.8 °C), Min:98 °F (36.7 °C), Max:98.8 °F (37.1 °C)      Exam:   Patient is intubated:  no    Physical Examination:   General:  Alert, cooperative, no distress   Head:  Normocephalic, atraumatic. Eyes:  Conjunctivae clear   Neck: Supple       Lungs:   No distress. Chest wall:     Heart:     Abdomen:   Non-distended   Extremities: Moves all. Skin:  Leg dressed   Neurologic: CNII-XII intact. Normal strength     Labs:        No lab exists for component: ITNL   No results for input(s): CPK, CKMB, TROIQ in the last 72 hours.   Recent Labs 10/23/20  0227 10/22/20  0545 10/21/20  0007    136 135*   K 4.0 4.1 3.9    104 103   CO2 25 28 26   BUN 14 9 12   CREA 1.14 0.62* 0.72   * 81 95   PHOS 4.4  --   --    MG 1.8 2.0  --    ALB  --  3.0* 3.7   WBC 13.0* 17.2* 18.0*   HGB 9.1* 9.8* 10.3*   HCT 29.5* 31.2* 33.3*    397 480*     No results for input(s): INR, PTP, APTT, INREXT, INREXT in the last 72 hours. Needs: urine analysis, urine sodium, protein and creatinine  No results found for: VIVEK, CREAU      Cultures:     Lab Results   Component Value Date/Time    Specimen Description: BLOOD 02/04/2010 10:55 PM    Specimen Description: BLOOD 02/12/2009 11:30 PM     Lab Results   Component Value Date/Time    Culture result: NO GROWTH 1 DAY 10/21/2020 02:51 AM    Culture result: NO GROWTH 1 DAY 10/21/2020 02:51 AM    Culture result: HEAVY DIPHTHEROIDS (A) 05/30/2019 03:00 PM    Culture result: (A) 05/30/2019 03:00 PM     LIGHT STENOTROPHOMONAS (Raya Sneddon.) MALTOPHILIA CLSI GUIDELINES DO NOT RECOMMEND REPORTING SUSCEPTIBILITIES FOR S. MALTOPHILIA. TRIMETHOPRIM/SULFAMETHOXAZOLE IS THE DRUG OF CHOICE.     Culture result: FEW PROTEUS MIRABILIS (A) 05/30/2019 03:00 PM    Culture result: FEW KLEBSIELLA PNEUMONIAE (A) 05/30/2019 03:00 PM       Radiology:     Medications       Current Facility-Administered Medications   Medication Dose Route Frequency Last Dose    diphenhydrAMINE (BENADRYL) capsule 25 mg  25 mg Oral Q6H PRN 25 mg at 10/23/20 0948    oxyCODONE IR (ROXICODONE) tablet 15 mg  15 mg Oral Q4H PRN 15 mg at 10/23/20 1350    HYDROmorphone (PF) (DILAUDID) injection 2 mg  2 mg IntraVENous Q3H PRN 2 mg at 10/23/20 1544    allopurinoL (ZYLOPRIM) tablet 100 mg  100 mg Oral DAILY 100 mg at 10/23/20 2465    pantoprazole (PROTONIX) tablet 40 mg  40 mg Oral ACB 40 mg at 10/23/20 0601    tamsulosin (FLOMAX) capsule 0.4 mg  0.4 mg Oral DAILY 0.4 mg at 10/23/20 0754    methadone (DOLOPHINE) tablet 140 mg  140 mg Oral DAILY 140 mg at 10/23/20 0754    nicotine (NICODERM CQ) 21 mg/24 hr patch 1 Patch  1 Patch TransDERmal DAILY      lisinopriL (PRINIVIL, ZESTRIL) tablet 20 mg  20 mg Oral DAILY Stopped at 10/23/20 0754    senna-docusate (PERICOLACE) 8.6-50 mg per tablet 1 Tab  1 Tab Oral DAILY 1 Tab at 10/23/20 0754    sodium chloride (NS) flush 5-40 mL  5-40 mL IntraVENous Q8H 10 mL at 10/23/20 1549    sodium chloride (NS) flush 5-40 mL  5-40 mL IntraVENous PRN      acetaminophen (TYLENOL) tablet 650 mg  650 mg Oral Q6H  mg at 10/23/20 0754    Or    acetaminophen (TYLENOL) suppository 650 mg  650 mg Rectal Q6H PRN      polyethylene glycol (MIRALAX) packet 17 g  17 g Oral DAILY PRN      ondansetron (ZOFRAN ODT) tablet 4 mg  4 mg Oral Q8H PRN 4 mg at 10/22/20 0256    Or    ondansetron (ZOFRAN) injection 4 mg  4 mg IntraVENous Q6H PRN      famotidine (PEPCID) tablet 20 mg  20 mg Oral BID 20 mg at 10/23/20 0755    enoxaparin (LOVENOX) injection 40 mg  40 mg SubCUTAneous DAILY 40 mg at 10/23/20 0751    gabapentin (NEURONTIN) capsule 100 mg  100 mg Oral  mg at 10/22/20 1751    colchicine tablet 0.6 mg  0.6 mg Oral DAILY PRN 0.6 mg at 10/21/20 2338    levothyroxine (SYNTHROID) tablet 112 mcg  112 mcg Oral  mcg at 10/23/20 0601    lidocaine (XYLOCAINE) 2 % jelly   Mucous Membrane PRN      aspirin chewable tablet 81 mg  81 mg Oral DAILY 81 mg at 10/23/20 0755    nystatin (MYCOSTATIN) 100,000 unit/gram powder   Topical BID             Case discussed with:      Erlinda Bartlett DO

## 2020-10-23 NOTE — PROGRESS NOTES
10/23/2020   12:31 PM  Update via chart review per Dr Zac Centeno pt agreeable to transfer to [facility]. CYNTHIA Collins       12:27 PM  Pt discussed in IDR rounds, is continuing to require medical management for Acute on Chronic L foot/leg infection, Hx of hemorrhagic CVA w/ L hemiparesis, HTN, bladder CA, chronic pain/depression/anxiety     Transitions of Care Plan:  LOS 2 day  RUR 17%   1. CM to follow through for treatment/response  3. PT/OT samirals completed recommending SNF  4. Podiatry consulted recommending transtibial amputation, they have signed off  5. Vascular recommending amputation  6. Wound Care following  7. DC when stable  8. Dispo SNF  9. Establish pt w/ PCP  10. Outpatient f/u PCP, podiatry  11.  Transport friend vs Medicaid  CYNTHIA Collins

## 2020-10-23 NOTE — PROGRESS NOTES
Problem: Self Care Deficits Care Plan (Adult)  Goal: *Acute Goals and Plan of Care (Insert Text)  Description: FUNCTIONAL STATUS PRIOR TO ADMISSION: 1 month ago pt was ambulatory using cane and performed functional mobility without assistance. He was admitted to outside hospital where he reports not mobilizing out of bed x 1 month and sustaining additional injury to LLE at that facility. HOME SUPPORT PRIOR TO ADMISSION: The patient lived with Desire Khalil to whom he refers as his \". \"    Occupational Therapy Goals  Initiated 10/22/2020  1. Patient will perform upper body dressing, using noe techniques, with supervision/set-up within 7 day(s). 2.  Patient will perform lower body dressing with minimal assistance/contact guard assist within 7 day(s). 3.  Patient will perform toilet transfers, to [facility],  with maximal assistance within 7 day(s). 4.  Patient will perform all aspects of toileting with maximal assistance within 7 day(s). OCCUPATIONAL THERAPY TREATMENT  Patient: Alla Varela (56 y.o. male)  Date: 10/23/2020  Diagnosis: Foot ulcer ([address] [M75.810]  Left foot infection [L08.9]   <principal problem not specified>       Precautions: Fall, Bed Alarm, Skin  Chart, occupational therapy assessment, plan of care, and goals were reviewed. ASSESSMENT  Patient continues with skilled OT services and is progressing towards goals. Pt seated in chair and educated as to noe technique to doff/don hospital gown. Pt min assist overall to task. Current Level of Function Impacting Discharge (ADLs): Min assist doff/don gown    Other factors to consider for discharge:          PLAN :  Patient continues to benefit from skilled intervention to address the above impairments. Continue treatment per established plan of care. to address goals. Recommend with staff:  There act, ADl's seated in chair    Recommend next OT session: Cont towards goals    Recommendation for discharge: (in order for the patient to meet his/her long term goals)  Therapy up to 5 days/week in SNF setting    This discharge recommendation:  Has not yet been discussed the attending provider and/or case management    IF patient discharges home will need the following DME:       SUBJECTIVE:   Patient stated I have to use this to keep my hand from closing    OBJECTIVE DATA SUMMARY:   Cognitive/Behavioral Status:  Neurologic State: Alert  Orientation Level: Oriented X4  Cognition: Follows commands             Functional Mobility and Transfers for ADLs:  Bed Mobility:   Not tested as pt already up in the chair    Transfers:   Max assist          Balance:  Sitting: Without support  Sitting - Static: Good (unsupported)  Sitting - Dynamic: Good (unsupported)    ADL Intervention:       Upper Body Dressing Assistance  Dressing Assistance: Minimum assistance  Hospital Gown: Minimum  assistance  Cues: Physical assistance;Verbal cues provided            Activity Tolerance:   Fair  Please refer to the flowsheet for vital signs taken during this treatment. After treatment patient left in no apparent distress:   Sitting in chair    COMMUNICATION/COLLABORATION:   The patients plan of care was discussed with: Occupational therapist and Registered nurse.      SANA Jarvis/L  Time Calculation: 13 mins

## 2020-10-23 NOTE — PROGRESS NOTES
Problem: Pressure Injury - Risk of  Goal: *Prevention of pressure injury  Description: Document Dejuan Scale and appropriate interventions in the flowsheet. Outcome: Progressing Towards Goal  Note: Pressure Injury Interventions:  Sensory Interventions: Assess changes in LOC, Assess need for specialty bed, Discuss PT/OT consult with provider, Maintain/enhance activity level    Moisture Interventions: Absorbent underpads    Activity Interventions: Increase time out of bed, PT/OT evaluation, Assess need for specialty bed    Mobility Interventions: Assess need for specialty bed, HOB 30 degrees or less, PT/OT evaluation, Pressure redistribution bed/mattress (bed type)    Nutrition Interventions: Document food/fluid/supplement intake    Friction and Shear Interventions: Apply protective barrier, creams and emollients, Lift team/patient mobility team                Problem: Patient Education: Go to Patient Education Activity  Goal: Patient/Family Education  Outcome: Progressing Towards Goal     Problem: Falls - Risk of  Goal: *Absence of Falls  Description: Document Denisa Caputo Fall Risk and appropriate interventions in the flowsheet.   Outcome: Progressing Towards Goal  Note: Fall Risk Interventions:  Mobility Interventions: Bed/chair exit alarm, Communicate number of staff needed for ambulation/transfer, OT consult for ADLs, Patient to call before getting OOB, PT Consult for mobility concerns, PT Consult for assist device competence, Utilize walker, cane, or other assistive device         Medication Interventions: Bed/chair exit alarm, Evaluate medications/consider consulting pharmacy, Patient to call before getting OOB, Teach patient to arise slowly    Elimination Interventions: Bed/chair exit alarm, Call light in reach, Patient to call for help with toileting needs    History of Falls Interventions: Bed/chair exit alarm, Door open when patient unattended, Investigate reason for fall, Room close to nurse's station Problem: Patient Education: Go to Patient Education Activity  Goal: Patient/Family Education  Outcome: Progressing Towards Goal     Problem: Pain  Goal: *Control of Pain  Outcome: Progressing Towards Goal  Goal: *PALLIATIVE CARE:  Alleviation of Pain  Outcome: Progressing Towards Goal

## 2020-10-23 NOTE — PROGRESS NOTES
2000: Pt BP is 81/51; pain 10/10 requesting oral pain meds. Notified Dr. King Santacruz.  Orders to give 10mg of oral pain meds and give 500cc bolus NS

## 2020-10-23 NOTE — PROGRESS NOTES
Bedside and Verbal shift change report given to Stevie (oncoming nurse) by Cedric Sanders (offgoing nurse). Report included the following information SBAR, Kardex, Procedure Summary, Intake/Output and MAR.        Dressing changed by wound care team.

## 2020-10-23 NOTE — WOUND CARE
Wound dressing change;  Patient refused current oil emulsion dressing. States 'it sticks and I use Curity dressing. \"    Left lower leg and dorsal foot wounds- present on admission.  Full thickness wounds- areas of red granulation- less than 50% thick yellow adherent slough on the edges- ludwig wound red and pink with areas of dry skin- tender and painful to touch - tan drainage - no odor  Foot erythemic- edematous   Left posterior knee red with like red yeast like rash  Patient continues to have pain with dressing changes and instructs nurse to apply lidocaine liberally when changing dressings.     Dressing change:  Wounds irrigated with saline- lidocaine and \"Curity\" emulsion dressing applied covered with ABD dressings and taped in place with Judith Melara RN wound nurse

## 2020-10-23 NOTE — PROGRESS NOTES
Gigi Marino [facility] 79  [address] [facility], [address] [place] [address]  [phone]      Medical Progress Note      NAME: Tala Neil   :  1965  MRM:  [id number]    Date/Time of service: 10/23/2020  12:11 PM       Subjective:     Chief Complaint:  Patient was personally seen and examined by me during this time period. Chart reviewed. No fevers, chills. Patient is willing to be transferred to [facility]. He declined [facility]       Objective:       Vitals:       Last 24hrs VS reviewed since prior progress note. Most recent are:    Visit Vitals  BP (!) 91/55 (BP 1 Location: Right arm, BP Patient Position: At rest)   Pulse 69   Temp 98.6 °F (37 °C)   Resp 16   Ht 6' 1\" (1.854 m)   Wt 109.8 kg (242 lb)   SpO2 96%   BMI 31.93 kg/m²     SpO2 Readings from Last 6 Encounters:   10/23/20 96%   10/15/19 99%   19 96%   19 95%   19 99%   05/10/19 92%            Intake/Output Summary (Last 24 hours) at 10/23/2020 1211  Last data filed at 10/23/2020 1013  Gross per 24 hour   Intake 240 ml   Output 200 ml   Net 40 ml        Exam:     Physical Exam:    Gen:  Disheveled, ill-appearing, obese, NAD  HEENT:  Pink conjunctivae, PERRL, hearing intact to voice, moist mucous membranes  Neck:  Supple, without masses, thyroid non-tender  Resp:  No accessory muscle use, clear breath sounds without wheezes rales or rhonchi  Card:  No murmurs, normal S1, S2 without thrills, 1+ edema  Abd:  Soft, non-tender, non-distended, normoactive bowel sounds are present  Musc:  No cyanosis or clubbing  Skin:  Large open wound from L ankle to mid leg with some necrosis.   Some area with good granulation tissue  Neuro:  Cranial nerves 3-12 are grossly intact, chronic L hemiparesis, follows commands appropriately  Psych:  poor insight, oriented to person, place and time, alert    Medications Reviewed: (see below)    Lab Data Reviewed: (see below)    ______________________________________________________________________    Medications:     Current Facility-Administered Medications   Medication Dose Route Frequency    diphenhydrAMINE (BENADRYL) capsule 25 mg  25 mg Oral Q6H PRN    oxyCODONE IR (ROXICODONE) tablet 15 mg  15 mg Oral Q4H PRN    HYDROmorphone (PF) (DILAUDID) injection 2 mg  2 mg IntraVENous Q3H PRN    allopurinoL (ZYLOPRIM) tablet 100 mg  100 mg Oral DAILY    pantoprazole (PROTONIX) tablet 40 mg  40 mg Oral ACB    tamsulosin (FLOMAX) capsule 0.4 mg  0.4 mg Oral DAILY    methadone (DOLOPHINE) tablet 140 mg  140 mg Oral DAILY    nicotine (NICODERM CQ) 21 mg/24 hr patch 1 Patch  1 Patch TransDERmal DAILY    lisinopriL (PRINIVIL, ZESTRIL) tablet 20 mg  20 mg Oral DAILY    senna-docusate (PERICOLACE) 8.6-50 mg per tablet 1 Tab  1 Tab Oral DAILY    sodium chloride (NS) flush 5-40 mL  5-40 mL IntraVENous Q8H    sodium chloride (NS) flush 5-40 mL  5-40 mL IntraVENous PRN    acetaminophen (TYLENOL) tablet 650 mg  650 mg Oral Q6H PRN    Or    acetaminophen (TYLENOL) suppository 650 mg  650 mg Rectal Q6H PRN    polyethylene glycol (MIRALAX) packet 17 g  17 g Oral DAILY PRN    ondansetron (ZOFRAN ODT) tablet 4 mg  4 mg Oral Q8H PRN    Or    ondansetron (ZOFRAN) injection 4 mg  4 mg IntraVENous Q6H PRN    famotidine (PEPCID) tablet 20 mg  20 mg Oral BID    enoxaparin (LOVENOX) injection 40 mg  40 mg SubCUTAneous DAILY    gabapentin (NEURONTIN) capsule 100 mg  100 mg Oral TID    colchicine tablet 0.6 mg  0.6 mg Oral DAILY PRN    levothyroxine (SYNTHROID) tablet 112 mcg  112 mcg Oral ACB    lidocaine (XYLOCAINE) 2 % jelly   Mucous Membrane PRN    aspirin chewable tablet 81 mg  81 mg Oral DAILY    nystatin (MYCOSTATIN) 100,000 unit/gram powder   Topical BID          Lab Review:     Recent Labs     10/23/20  0227 10/22/20  0545 10/21/20  0007   WBC 13.0* 17.2* 18.0*   HGB 9.1* 9.8* 10.3*   HCT 29.5* 31.2* 33.3*    397 480* Recent Labs     10/23/20  0227 10/22/20  0545 10/21/20  0007    136 135*   K 4.0 4.1 3.9    104 103   CO2 25 28 26   * 81 95   BUN 14 9 12   CREA 1.14 0.62* 0.72   CA 8.3* 8.2* 8.5   MG 1.8 2.0  --    PHOS 4.4  --   --    ALB  --  3.0* 3.7   TBILI  --  0.6 0.2   ALT  --  22 25     Lab Results   Component Value Date/Time    Glucose (POC) 150 (H) 03/19/2018 11:45 AM    Glucose (POC) 123 (H) 03/18/2018 08:55 PM    Glucose (POC) 96 03/16/2018 04:58 PM    Glucose (POC) 116 (H) 12/12/2012 12:42 PM    Glucose (POC) 99 12/11/2012 11:27 PM          Assessment / Plan:     54 yo hx of HTN, CVA w/ L hemiparesis, bladder CA, depression, chronic pain, chronic L foot/leg infection, presented w/ worsening infection    1) Acute on chronic L foot/leg infection: has been to multiple facilities. Plastics surg at [facility] recommended amputation. Prior skin biopsy in 2019 concerning for pyoderma gangrenosum. Will hold on IV abx. ID, vascular surg, and podiatry following. Cont would care per wound care team.  Patient is willing to be transferred to a tertiary care center for further evaluation. I've called [facility], awaiting response. [facility] declined [facility]    2) Acute on Chronic pain/depression/anxiety: cont methadone, neurontin. Use oxycodone prn mod pain, use IV dilaudid prn severe pain    3) Hx of hemorrhagic CVA w/ L hemiparesis: cont ASA, PT/OT    4) HTN: cont lisinopril    5) Hx of bladder CA: f/u with Urologist in [facility].   Cont flomax    6) Hypothyroid: cont synthroid     Total time spent with patient: 39 Minutes **I personally saw and examined the patient during this time period**                 Care Plan discussed with: Patient, nursing, transfer center    Discussed:  Care Plan    Prophylaxis:  Lovenox    Disposition:  [facility] pending           ___________________________________________________    Attending Physician: Kathy Benites MD

## 2020-10-23 NOTE — PROGRESS NOTES
Problem: Mobility Impaired (Adult and Pediatric)  Goal: *Acute Goals and Plan of Care (Insert Text)  Description: FUNCTIONAL STATUS PRIOR TO ADMISSION: 1 month ago pt was ambulatory using cane and performed functional mobility without assistance. He was admitted to outside hospital where he reports not mobilizing out of bed x 1 month and sustaining additional injury to LLE at that facility. HOME SUPPORT PRIOR TO ADMISSION: The patient lived with Jodee Bermudez to whom he refers as his \". \"    Physical Therapy Goals  Initiated 10/22/2020  1. Patient will move from supine to sit and sit to supine  in bed with supervision/set-up within 7 day(s). 2.  Patient will transfer from bed to chair and chair to bed with maximal assistance using the least restrictive device within 7 day(s). 3.  Patient will demonstrate independence with HEP for strengthening within 7 days. Note:   PHYSICAL THERAPY TREATMENT  Patient: Janelle Bob (64 y.o. male)  Date: 10/23/2020  Diagnosis: Foot ulcer ([facility] [I11.614]  Left foot infection [L08.9]   <principal problem not specified>       Precautions: Fall, Bed Alarm, Skin  Chart, physical therapy assessment, plan of care and goals were reviewed. ASSESSMENT  Patient continues with skilled PT services. Pt sitting on arrival of PT. Pt is NWB on left LE. Pt declined transfer to bedside commode. Pt eventually agreed to lateral transfer back to bed. Pt performed lateral transfer from chair with removable armrest to bed with max assist of 2. Pt sit to supine with min assist.Pt progress slow. Continue goals. Current Level of Function Impacting Discharge (mobility/balance):Pt max of 2 for lateral tranfer bed to chair. PLAN :  Patient continues to benefit from skilled intervention to address the above impairments. Continue treatment per established plan of care. to address goals.     Recommendation for discharge: (in order for the patient to meet his/her long term goals)  To be determined: This discharge recommendation:  Has been made in collaboration with the attending provider and/or case management    IF patient discharges home will need the following DME:        SUBJECTIVE:       OBJECTIVE DATA SUMMARY:   Critical Behavior:  Neurologic State: Alert  Orientation Level: Oriented X4  Cognition: Follows commands     Functional Mobility Training:  Bed Mobility:      Mod assist to min assist              Transfers:              Bed to Chair: Maximum assistance;Assist x2                    Balance:  Sitting: Intact  Sitting - Static: Good (unsupported)  Sitting - Dynamic: Good (unsupported)                  Activity Tolerance:   Fair  Please refer to the flowsheet for vital signs taken during this treatment.     After treatment patient left in no apparent distress:   Supine in bed    COMMUNICATION/COLLABORATION:   The patients plan of care was discussed with: Physical therapist.     Butch Hoff PTA   Time Calculation: 23 mins

## 2020-10-23 NOTE — PROGRESS NOTES
8086: Bedside shift change report given to Dalton Beltran RN (oncoming nurse) by Narcisa Prieto RN (offgoing nurse). Report included the following information SBAR, Kardex, Intake/Output and MAR.

## 2020-10-24 PROCEDURE — 74011250636 HC RX REV CODE- 250/636: Performed by: INTERNAL MEDICINE

## 2020-10-24 PROCEDURE — 74011250637 HC RX REV CODE- 250/637: Performed by: INTERNAL MEDICINE

## 2020-10-24 PROCEDURE — 74011000250 HC RX REV CODE- 250: Performed by: INTERNAL MEDICINE

## 2020-10-24 PROCEDURE — 77030018836 HC SOL IRR NACL ICUM -A

## 2020-10-24 PROCEDURE — 74011250637 HC RX REV CODE- 250/637: Performed by: FAMILY MEDICINE

## 2020-10-24 PROCEDURE — 65270000029 HC RM PRIVATE

## 2020-10-24 RX ORDER — OXYCODONE HYDROCHLORIDE 5 MG/1
20 TABLET ORAL
Status: DISCONTINUED | OUTPATIENT
Start: 2020-10-24 | End: 2020-10-24

## 2020-10-24 RX ORDER — AMOXICILLIN 250 MG
1 CAPSULE ORAL 2 TIMES DAILY
Status: DISCONTINUED | OUTPATIENT
Start: 2020-10-24 | End: 2020-10-31 | Stop reason: HOSPADM

## 2020-10-24 RX ORDER — OXYCODONE HYDROCHLORIDE 5 MG/1
15 TABLET ORAL
Status: DISCONTINUED | OUTPATIENT
Start: 2020-10-24 | End: 2020-10-24

## 2020-10-24 RX ORDER — HYDROMORPHONE HYDROCHLORIDE 2 MG/1
4 TABLET ORAL
Status: DISCONTINUED | OUTPATIENT
Start: 2020-10-24 | End: 2020-10-31 | Stop reason: HOSPADM

## 2020-10-24 RX ORDER — KETOROLAC TROMETHAMINE 30 MG/ML
15 INJECTION, SOLUTION INTRAMUSCULAR; INTRAVENOUS
Status: DISCONTINUED | OUTPATIENT
Start: 2020-10-24 | End: 2020-10-24

## 2020-10-24 RX ADMIN — ALLOPURINOL 100 MG: 100 TABLET ORAL at 08:05

## 2020-10-24 RX ADMIN — METHADONE HYDROCHLORIDE 140 MG: 10 TABLET ORAL at 08:05

## 2020-10-24 RX ADMIN — HYDROMORPHONE HYDROCHLORIDE 4 MG: 2 TABLET ORAL at 20:49

## 2020-10-24 RX ADMIN — FAMOTIDINE 20 MG: 20 TABLET, FILM COATED ORAL at 08:05

## 2020-10-24 RX ADMIN — OXYCODONE 15 MG: 5 TABLET ORAL at 04:22

## 2020-10-24 RX ADMIN — HYDROMORPHONE HYDROCHLORIDE 4 MG: 2 TABLET ORAL at 17:32

## 2020-10-24 RX ADMIN — DOCUSATE SODIUM 50MG AND SENNOSIDES 8.6MG 1 TABLET: 8.6; 5 TABLET, FILM COATED ORAL at 08:05

## 2020-10-24 RX ADMIN — PANTOPRAZOLE SODIUM 40 MG: 40 TABLET, DELAYED RELEASE ORAL at 05:10

## 2020-10-24 RX ADMIN — HYDROMORPHONE HYDROCHLORIDE 4 MG: 2 TABLET ORAL at 23:59

## 2020-10-24 RX ADMIN — HYDROMORPHONE HYDROCHLORIDE 4 MG: 2 TABLET ORAL at 14:52

## 2020-10-24 RX ADMIN — LIDOCAINE HYDROCHLORIDE: 20 JELLY TOPICAL at 10:26

## 2020-10-24 RX ADMIN — OXYCODONE 20 MG: 5 TABLET ORAL at 11:36

## 2020-10-24 RX ADMIN — OXYCODONE 20 MG: 5 TABLET ORAL at 08:04

## 2020-10-24 RX ADMIN — HYDROMORPHONE HYDROCHLORIDE 2 MG: 2 INJECTION, SOLUTION INTRAMUSCULAR; INTRAVENOUS; SUBCUTANEOUS at 01:44

## 2020-10-24 RX ADMIN — NYSTATIN: 100000 POWDER TOPICAL at 08:04

## 2020-10-24 RX ADMIN — Medication 10 ML: at 05:09

## 2020-10-24 RX ADMIN — ASPIRIN 81 MG 81 MG: 81 TABLET ORAL at 08:05

## 2020-10-24 RX ADMIN — DIPHENHYDRAMINE HYDROCHLORIDE 25 MG: 25 CAPSULE ORAL at 11:37

## 2020-10-24 RX ADMIN — ENOXAPARIN SODIUM 40 MG: 40 INJECTION SUBCUTANEOUS at 08:03

## 2020-10-24 RX ADMIN — DOCUSATE SODIUM 50MG AND SENNOSIDES 8.6MG 1 TABLET: 8.6; 5 TABLET, FILM COATED ORAL at 17:32

## 2020-10-24 RX ADMIN — NYSTATIN: 100000 POWDER TOPICAL at 17:32

## 2020-10-24 RX ADMIN — LEVOTHYROXINE SODIUM 112 MCG: 0.11 TABLET ORAL at 05:10

## 2020-10-24 RX ADMIN — TAMSULOSIN HYDROCHLORIDE 0.4 MG: 0.4 CAPSULE ORAL at 08:05

## 2020-10-24 RX ADMIN — HYDROMORPHONE HYDROCHLORIDE 2 MG: 2 INJECTION, SOLUTION INTRAMUSCULAR; INTRAVENOUS; SUBCUTANEOUS at 05:56

## 2020-10-24 RX ADMIN — FAMOTIDINE 20 MG: 20 TABLET, FILM COATED ORAL at 17:32

## 2020-10-24 NOTE — PROGRESS NOTES
Case Management Note    Consult notes for SNF. Patient fell this morning and not feeling like discussing SNF placement, he does not want to go to SNF. Patient stated he will talk to [facility] regarding discharge plans. I did leave her a VM to discuss SNF placement.       Soni Andrade BS
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
## 2020-10-24 NOTE — PROGRESS NOTES
Problem: Patient Education: Go to Patient Education Activity  Goal: Patient/Family Education  Outcome: Progressing Towards Goal     Problem: Falls - Risk of  Goal: *Absence of Falls  Description: Document Francis Wynn Fall Risk and appropriate interventions in the flowsheet.   Outcome: Progressing Towards Goal  Note: Fall Risk Interventions:  Mobility Interventions: Bed/chair exit alarm, Communicate number of staff needed for ambulation/transfer, Patient to call before getting OOB         Medication Interventions: Bed/chair exit alarm, Patient to call before getting OOB    Elimination Interventions: Bed/chair exit alarm    History of Falls Interventions: Bed/chair exit alarm

## 2020-10-24 NOTE — PROGRESS NOTES
Gigi Marino [facility] 79  [address] [facility], [address] [place] [address]  [phone]      Medical Progress Note      NAME: Merari Oliveira   :  1965  MRM:  [id number]    Date/Time of service: 10/24/2020  11:52 AM       Subjective:     Chief Complaint:  Patient was personally seen and examined by me during this time period. Chart reviewed. Patient had a fall this AM after slipping. Patient asked me to speak with friend Evelina Deharriet, whom I called       Objective:       Vitals:       Last 24hrs VS reviewed since prior progress note. Most recent are:    Visit Vitals  /82 (BP 1 Location: Left arm, BP Patient Position: At rest)   Pulse 82   Temp 97.9 °F (36.6 °C)   Resp 19   Ht 6' 1\" (1.854 m)   Wt 109.8 kg (242 lb)   SpO2 96%   BMI 31.93 kg/m²     SpO2 Readings from Last 6 Encounters:   10/24/20 96%   10/15/19 99%   19 96%   19 95%   19 99%   05/10/19 92%            Intake/Output Summary (Last 24 hours) at 10/24/2020 1152  Last data filed at 10/23/2020 2321  Gross per 24 hour   Intake --   Output 1125 ml   Net -1125 ml        Exam:     Physical Exam:    Gen:  Disheveled, ill-appearing, obese, NAD  HEENT:  Pink conjunctivae, PERRL, hearing intact to voice, moist mucous membranes  Neck:  Supple, without masses, thyroid non-tender  Resp:  No accessory muscle use, clear breath sounds without wheezes rales or rhonchi  Card:  No murmurs, normal S1, S2 without thrills, 1+ edema  Abd:  Soft, non-tender, non-distended, normoactive bowel sounds are present  Musc:  No cyanosis or clubbing  Skin:  Large open wound from L ankle to mid leg with some necrosis.   Some area with good granulation tissue  Neuro:  Cranial nerves 3-12 are grossly intact, chronic L hemiparesis, follows commands appropriately  Psych:  poor insight, oriented to person, place and time, alert    Medications Reviewed: (see below)    Lab Data Reviewed: (see below)    ______________________________________________________________________    Medications:     Current Facility-Administered Medications   Medication Dose Route Frequency    oxyCODONE IR (ROXICODONE) tablet 20 mg  20 mg Oral Q4H PRN    senna-docusate (PERICOLACE) 8.6-50 mg per tablet 1 Tab  1 Tab Oral BID    diphenhydrAMINE (BENADRYL) capsule 25 mg  25 mg Oral Q6H PRN    HYDROmorphone (PF) (DILAUDID) injection 2 mg  2 mg IntraVENous Q3H PRN    allopurinoL (ZYLOPRIM) tablet 100 mg  100 mg Oral DAILY    pantoprazole (PROTONIX) tablet 40 mg  40 mg Oral ACB    tamsulosin (FLOMAX) capsule 0.4 mg  0.4 mg Oral DAILY    methadone (DOLOPHINE) tablet 140 mg  140 mg Oral DAILY    nicotine (NICODERM CQ) 21 mg/24 hr patch 1 Patch  1 Patch TransDERmal DAILY    lisinopriL (PRINIVIL, ZESTRIL) tablet 20 mg  20 mg Oral DAILY    sodium chloride (NS) flush 5-40 mL  5-40 mL IntraVENous Q8H    sodium chloride (NS) flush 5-40 mL  5-40 mL IntraVENous PRN    acetaminophen (TYLENOL) tablet 650 mg  650 mg Oral Q6H PRN    Or    acetaminophen (TYLENOL) suppository 650 mg  650 mg Rectal Q6H PRN    polyethylene glycol (MIRALAX) packet 17 g  17 g Oral DAILY PRN    ondansetron (ZOFRAN ODT) tablet 4 mg  4 mg Oral Q8H PRN    Or    ondansetron (ZOFRAN) injection 4 mg  4 mg IntraVENous Q6H PRN    famotidine (PEPCID) tablet 20 mg  20 mg Oral BID    enoxaparin (LOVENOX) injection 40 mg  40 mg SubCUTAneous DAILY    gabapentin (NEURONTIN) capsule 100 mg  100 mg Oral TID    colchicine tablet 0.6 mg  0.6 mg Oral DAILY PRN    levothyroxine (SYNTHROID) tablet 112 mcg  112 mcg Oral ACB    lidocaine (XYLOCAINE) 2 % jelly   Mucous Membrane PRN    aspirin chewable tablet 81 mg  81 mg Oral DAILY    nystatin (MYCOSTATIN) 100,000 unit/gram powder   Topical BID          Lab Review:     Recent Labs     10/23/20  0227 10/22/20  0545   WBC 13.0* 17.2*   HGB 9.1* 9.8*   HCT 29.5* 31.2*    397     Recent Labs     10/23/20  0227 10/22/20  0545    136   K 4.0 4.1    104   CO2 25 28   * 81   BUN 14 9   CREA 1.14 0.62*   CA 8.3* 8.2*   MG 1.8 2.0   PHOS 4.4  --    ALB  --  3.0*   TBILI  --  0.6   ALT  --  22     Lab Results   Component Value Date/Time    Glucose (POC) 150 (H) 03/19/2018 11:45 AM    Glucose (POC) 123 (H) 03/18/2018 08:55 PM    Glucose (POC) 96 03/16/2018 04:58 PM    Glucose (POC) 116 (H) 12/12/2012 12:42 PM    Glucose (POC) 99 12/11/2012 11:27 PM          Assessment / Plan:     54 yo hx of HTN, CVA w/ L hemiparesis, bladder CA, depression, chronic pain, chronic L foot/leg infection, presented w/ worsening infection    1) Acute on chronic L foot/leg infection: has been to multiple facilities. Plastics surg at [facility] recommended amputation. Prior skin biopsy in April 2019 concerning for pyoderma gangrenosum. Will hold on IV abx. ID, vascular surg, and podiatry following. Cont would care per wound care team.  [facility] declined to take patient. At this point, patient will need good wound care at a SNF, then f/u with dermatology, plastics, and wound care clinic as an outpatient    2) Acute on Chronic pain/depression/anxiety: cont methadone, neurontin. Patient loss IV access, will convert pain meds to oral dilaudid prn    3) Hx of hemorrhagic CVA w/ L hemiparesis: cont ASA, PT/OT    4) HTN: cont lisinopril    5) Hx of bladder CA: f/u with Urologist in [place].   Cont flomax    6) Hypothyroid: cont synthroid     Total time spent with patient: 35 Minutes (I spent >50% of time on care coordination) **I personally saw and examined the patient during this time period**                 Care Plan discussed with: Patient, nursing, friend     Discussed:  Care Plan    Prophylaxis:  Lovenox    Disposition:  SNF pending           ___________________________________________________    Attending Physician: Angelique Noland MD

## 2020-10-24 NOTE — PROGRESS NOTES
Patient wouldn't let me look at his foot bandages are coming. Stated he is not getting his foot cut off. Stating his pain is 10/10 tried to explain need to get rid of infection pain medication is only a temporary solution patient was not hearing it.

## 2020-10-24 NOTE — PROGRESS NOTES
Patient keeps asking for pain medication every 2 hours stating pain his always a 10/10 even after just getting medication advised him diogo is for pain lever 4-6 and if he is at 10/10 we need to contact dr to change pain level order. Called hospital to change diogo level.

## 2020-10-24 NOTE — PROGRESS NOTES
Bedside and Verbal shift change report given to Stevie (oncoming nurse) by Sadia Burrell (offgoing nurse). Report included the following information SBAR, Kardex, Procedure Summary, Intake/Output and MAR. The patient  has a history of falls. I did complete a risk assessment. 1015- Patient fell onto the floor beside bed trying to put himself back to bed. Patient was put into the chair for less than 30 minutes. Patient removed chair alarm and removed the side of the chair. Patient tried to move from the chair back to bed by himself. Patient did not use call bell. Patient stated that the chair was not lock however, nurse had to unlock the chair to get to the patient. IV was dislodge with fall. Once patient was in bed with complete assessment. No new injuries noted. Dressing was placed were IV came out. Also completed wound care to left foot. Patient was rude and demanding to staff. Patient taking down the side rail himself when staff was turn and repositioning patient. Nurse cleaning and changing wound patient stated that he did not want to use items wound care nurse suggested. Asked patient several times not to touch clean wound with dirty hands. Patient stated that staff was placing items somewhere else, so he could not find items. Nurse and staff gave patient all of the items in the room to bedside. Patient did not want another IV to be replaced. Notified Dr. Makayla Jerome and Dr. Makayla Jerome aware of no IV access and changed medications to PO medications.

## 2020-10-24 NOTE — PROGRESS NOTES
Advise Sourav Cooper to take his mikey to help with pain he stated only wants dilaudid for pain medication.

## 2020-10-24 NOTE — PROGRESS NOTES
Bedside, Verbal and Written shift change report given to Hannah Martin (oncoming nurse) by Ran Delgado (offgoing nurse). Report included the following information SBAR, Kardex, ED Summary, Procedure Summary, Intake/Output, MAR, Recent Results, Med Rec Status and Cardiac Rhythm arrythmia.

## 2020-10-25 PROCEDURE — 74011250637 HC RX REV CODE- 250/637: Performed by: INTERNAL MEDICINE

## 2020-10-25 PROCEDURE — 65270000029 HC RM PRIVATE

## 2020-10-25 PROCEDURE — 74011250636 HC RX REV CODE- 250/636: Performed by: INTERNAL MEDICINE

## 2020-10-25 RX ORDER — OXYCODONE AND ACETAMINOPHEN 5; 325 MG/1; MG/1
2 TABLET ORAL
Status: DISCONTINUED | OUTPATIENT
Start: 2020-10-25 | End: 2020-10-27

## 2020-10-25 RX ADMIN — TAMSULOSIN HYDROCHLORIDE 0.4 MG: 0.4 CAPSULE ORAL at 08:16

## 2020-10-25 RX ADMIN — HYDROMORPHONE HYDROCHLORIDE 4 MG: 2 TABLET ORAL at 15:45

## 2020-10-25 RX ADMIN — GABAPENTIN 100 MG: 100 CAPSULE ORAL at 09:21

## 2020-10-25 RX ADMIN — HYDROMORPHONE HYDROCHLORIDE 4 MG: 2 TABLET ORAL at 10:37

## 2020-10-25 RX ADMIN — COLCHICINE 0.6 MG: 0.6 TABLET, FILM COATED ORAL at 11:43

## 2020-10-25 RX ADMIN — HYDROMORPHONE HYDROCHLORIDE 4 MG: 2 TABLET ORAL at 19:08

## 2020-10-25 RX ADMIN — HYDROMORPHONE HYDROCHLORIDE 4 MG: 2 TABLET ORAL at 03:43

## 2020-10-25 RX ADMIN — OXYCODONE HYDROCHLORIDE AND ACETAMINOPHEN 2 TABLET: 5; 325 TABLET ORAL at 12:57

## 2020-10-25 RX ADMIN — FAMOTIDINE 20 MG: 20 TABLET, FILM COATED ORAL at 17:11

## 2020-10-25 RX ADMIN — NYSTATIN: 100000 POWDER TOPICAL at 17:12

## 2020-10-25 RX ADMIN — LEVOTHYROXINE SODIUM 112 MCG: 0.11 TABLET ORAL at 05:47

## 2020-10-25 RX ADMIN — FAMOTIDINE 20 MG: 20 TABLET, FILM COATED ORAL at 08:15

## 2020-10-25 RX ADMIN — OXYCODONE HYDROCHLORIDE AND ACETAMINOPHEN 2 TABLET: 5; 325 TABLET ORAL at 21:23

## 2020-10-25 RX ADMIN — PANTOPRAZOLE SODIUM 40 MG: 40 TABLET, DELAYED RELEASE ORAL at 05:47

## 2020-10-25 RX ADMIN — METHADONE HYDROCHLORIDE 140 MG: 10 TABLET ORAL at 08:14

## 2020-10-25 RX ADMIN — HYDROMORPHONE HYDROCHLORIDE 4 MG: 2 TABLET ORAL at 07:22

## 2020-10-25 RX ADMIN — ENOXAPARIN SODIUM 40 MG: 40 INJECTION SUBCUTANEOUS at 08:14

## 2020-10-25 RX ADMIN — DIPHENHYDRAMINE HYDROCHLORIDE 25 MG: 25 CAPSULE ORAL at 11:43

## 2020-10-25 RX ADMIN — ASPIRIN 81 MG 81 MG: 81 TABLET ORAL at 08:15

## 2020-10-25 RX ADMIN — HYDROMORPHONE HYDROCHLORIDE 4 MG: 2 TABLET ORAL at 23:08

## 2020-10-25 NOTE — PROGRESS NOTES
1810: Patient refusing wound care until friend comes this evening with home supplies. RN will complete wound care on shift, if not will notify the oncoming RN. 1900 Verbal shift change report given to Jess Tinoco RN  (oncoming nurse) by Nancy Talavera RN (offgoing nurse). Report included the following information SBAR, Kardex, Intake/Output, MAR and Med Rec Status.

## 2020-10-25 NOTE — PROGRESS NOTES
Bedside, Verbal and Written shift change report given to [id number] (oncoming nurse) by Jimena Bautista (offgoing nurse). Report included the following information SBAR, Kardex, ED Summary, Procedure Summary, Intake/Output, MAR, Recent Results and Med Rec Status.

## 2020-10-25 NOTE — PROGRESS NOTES
Problem: Pressure Injury - Risk of  Goal: *Prevention of pressure injury  Description: Document Dejuan Scale and appropriate interventions in the flowsheet. Outcome: Progressing Towards Goal  Note: Pressure Injury Interventions:  Sensory Interventions: Assess changes in LOC    Moisture Interventions: Absorbent underpads    Activity Interventions: Increase time out of bed    Mobility Interventions: Pressure redistribution bed/mattress (bed type)    Nutrition Interventions: Document food/fluid/supplement intake    Friction and Shear Interventions: Apply protective barrier, creams and emollients                Problem: Patient Education: Go to Patient Education Activity  Goal: Patient/Family Education  Outcome: Progressing Towards Goal     Problem: Falls - Risk of  Goal: *Absence of Falls  Description: Document Jordon Fall Risk and appropriate interventions in the flowsheet.   Outcome: Progressing Towards Goal  Note: Fall Risk Interventions:  Mobility Interventions: Bed/chair exit alarm         Medication Interventions: Evaluate medications/consider consulting pharmacy    Elimination Interventions: Call light in reach    History of Falls Interventions: Bed/chair exit alarm

## 2020-10-25 NOTE — PROGRESS NOTES
Gigi Marino [facility] 79  380 [facility], [address] [place] [address]  [phone]      Medical Progress Note      NAME: Jennifer Anguiano   :  1965  MRM:  [id number]    Date of service: 10/25/2020  10:36 AM       Assessment and Plan:   1. Acute on chronic L foot/leg infection: has been to multiple facilities. Plastics surg at [facility] recommended amputation. Prior skin biopsy in 2019 concerning for pyoderma gangrenosum. Will hold on IV abx. ID, vascular surg, and podiatry following. Cont would care per wound care team.  [facility] declined to take patient. At this point, patient will need good wound care at home, then f/u with dermatology, plastics, and wound care clinic as an outpatient.      2. Acute on Chronic pain/depression/anxiety: cont methadone, neurontin and dilaudid PO.       3. Hx of hemorrhagic CVA w/ L hemiparesis: cont ASA, PT/OT     4. HTN: cont lisinopril     5. Hx of bladder CA: f/u with Urologist in [place]. Cont flomax     6. Hypothyroid: cont synthroid             Subjective:     Chief Complaint[de-identified] Patient was seen and examined as a follow up for leg infection. Chart was reviewed. c/o severe leg pain     ROS:  (bold if positive, if negative)    Tolerating PT  Tolerating Diet        Objective:     Last 24hrs VS reviewed since prior progress note.  Most recent are:    Visit Vitals  /72 (BP 1 Location: Right arm, BP Patient Position: Sitting)   Pulse 74   Temp 98.5 °F (36.9 °C)   Resp 18   Ht 6' 1\" (1.854 m)   Wt 109.8 kg (242 lb)   SpO2 96%   BMI 31.93 kg/m²     SpO2 Readings from Last 6 Encounters:   10/25/20 96%   10/15/19 99%   19 96%   19 95%   19 99%   05/10/19 92%            Intake/Output Summary (Last 24 hours) at 10/25/2020 1036  Last data filed at 10/25/2020 0911  Gross per 24 hour   Intake 240 ml   Output 1150 ml   Net -910 ml        Physical Exam:    Gen:  Well-developed, well-nourished, in no acute distress  HEENT:  Pink conjunctivae, PERRL, hearing intact to voice, moist mucous membranes  Neck:  Supple, without masses, thyroid non-tender  Resp:  No accessory muscle use, clear breath sounds without wheezes rales or rhonchi  Card:  No murmurs, normal S1, S2 without thrills, bruits or peripheral edema  Abd:  Soft, non-tender, non-distended, normoactive bowel sounds are present, no palpable organomegaly and no detectable hernias  Lymph:  No cervical or inguinal adenopathy  Musc:  No cyanosis or clubbing  Skin:  No rashes or ulcers, skin turgor is good  Neuro:  Cranial nerves are grossly intact, no focal motor weakness, follows commands appropriately  Psych:  Good insight, oriented to person, place and time, alert  __________________________________________________________________  Medications Reviewed: (see below)  Medications:     Current Facility-Administered Medications   Medication Dose Route Frequency    senna-docusate (PERICOLACE) 8.6-50 mg per tablet 1 Tab  1 Tab Oral BID    HYDROmorphone (DILAUDID) tablet 4 mg  4 mg Oral Q3H PRN    diphenhydrAMINE (BENADRYL) capsule 25 mg  25 mg Oral Q6H PRN    allopurinoL (ZYLOPRIM) tablet 100 mg  100 mg Oral DAILY    pantoprazole (PROTONIX) tablet 40 mg  40 mg Oral ACB    tamsulosin (FLOMAX) capsule 0.4 mg  0.4 mg Oral DAILY    methadone (DOLOPHINE) tablet 140 mg  140 mg Oral DAILY    nicotine (NICODERM CQ) 21 mg/24 hr patch 1 Patch  1 Patch TransDERmal DAILY    lisinopriL (PRINIVIL, ZESTRIL) tablet 20 mg  20 mg Oral DAILY    sodium chloride (NS) flush 5-40 mL  5-40 mL IntraVENous Q8H    sodium chloride (NS) flush 5-40 mL  5-40 mL IntraVENous PRN    acetaminophen (TYLENOL) tablet 650 mg  650 mg Oral Q6H PRN    Or    acetaminophen (TYLENOL) suppository 650 mg  650 mg Rectal Q6H PRN    polyethylene glycol (MIRALAX) packet 17 g  17 g Oral DAILY PRN    ondansetron (ZOFRAN ODT) tablet 4 mg  4 mg Oral Q8H PRN    Or    ondansetron (ZOFRAN) injection 4 mg  4 mg IntraVENous Q6H PRN    famotidine (PEPCID) tablet 20 mg  20 mg Oral BID    enoxaparin (LOVENOX) injection 40 mg  40 mg SubCUTAneous DAILY    gabapentin (NEURONTIN) capsule 100 mg  100 mg Oral TID    colchicine tablet 0.6 mg  0.6 mg Oral DAILY PRN    levothyroxine (SYNTHROID) tablet 112 mcg  112 mcg Oral ACB    lidocaine (XYLOCAINE) 2 % jelly   Mucous Membrane PRN    aspirin chewable tablet 81 mg  81 mg Oral DAILY    nystatin (MYCOSTATIN) 100,000 unit/gram powder   Topical BID        Lab Data Reviewed: (see below)  Lab Review:     Recent Labs     10/23/20  0227   WBC 13.0*   HGB 9.1*   HCT 29.5*        Recent Labs     10/23/20  0227      K 4.0      CO2 25   *   BUN 14   CREA 1.14   CA 8.3*   MG 1.8   PHOS 4.4     Lab Results   Component Value Date/Time    Glucose (POC) 150 (H) 03/19/2018 11:45 AM    Glucose (POC) 123 (H) 03/18/2018 08:55 PM    Glucose (POC) 96 03/16/2018 04:58 PM    Glucose (POC) 116 (H) 12/12/2012 12:42 PM    Glucose (POC) 99 12/11/2012 11:27 PM     No results for input(s): PH, PCO2, PO2, HCO3, FIO2 in the last 72 hours. No results for input(s): INR, INREXT in the last 72 hours. All Micro Results     Procedure Component Value Units Date/Time    CULTURE, BLOOD, PERIPHERAL [[id number]] Collected:  10/21/20 0251    Order Status:  Completed Specimen:  Blood Updated:  10/25/20 0602     Special Requests: NO SPECIAL REQUESTS        Culture result: NO GROWTH 4 DAYS       CULTURE, BLOOD, PERIPHERAL [[id number]] Collected:  10/21/20 0251    Order Status:  Completed Specimen:  Blood Updated:  10/25/20 0602     Special Requests: NO SPECIAL REQUESTS        Culture result: NO GROWTH 4 DAYS       CULTURE, Lawrence Iglesias [[id number]] Collected:  10/21/20 0730    Order Status:  Canceled Specimen:  Leg           I have reviewed notes of prior 24hr. Other pertinent lab:      Total time spent with patient: [address] discussed with: Patient, Nursing Staff and >50% of time spent in counseling and coordination of care    Discussed:  Care Plan    Prophylaxis:  Lovenox    Disposition:  Home w/Family           ___________________________________________________    Attending Physician: rBianna Ramirez MD

## 2020-10-26 LAB
ANION GAP SERPL CALC-SCNC: 7 MMOL/L (ref 5–15)
BASOPHILS # BLD: 0 K/UL (ref 0–0.1)
BASOPHILS NFR BLD: 0 % (ref 0–1)
BUN SERPL-MCNC: 7 MG/DL (ref 6–20)
BUN/CREAT SERPL: 9 (ref 12–20)
CALCIUM SERPL-MCNC: 8.6 MG/DL (ref 8.5–10.1)
CHLORIDE SERPL-SCNC: 104 MMOL/L (ref 97–108)
CO2 SERPL-SCNC: 26 MMOL/L (ref 21–32)
CREAT SERPL-MCNC: 0.74 MG/DL (ref 0.7–1.3)
DIFFERENTIAL METHOD BLD: ABNORMAL
EOSINOPHIL # BLD: 0.6 K/UL (ref 0–0.4)
EOSINOPHIL NFR BLD: 6 % (ref 0–7)
ERYTHROCYTE [DISTWIDTH] IN BLOOD BY AUTOMATED COUNT: 15.8 % (ref 11.5–14.5)
GLUCOSE SERPL-MCNC: 96 MG/DL (ref 65–100)
HCT VFR BLD AUTO: 31.3 % (ref 36.6–50.3)
HGB BLD-MCNC: 9.6 G/DL (ref 12.1–17)
IMM GRANULOCYTES # BLD AUTO: 0 K/UL (ref 0–0.04)
IMM GRANULOCYTES NFR BLD AUTO: 0 % (ref 0–0.5)
LYMPHOCYTES # BLD: 2.3 K/UL (ref 0.8–3.5)
LYMPHOCYTES NFR BLD: 23 % (ref 12–49)
MCH RBC QN AUTO: 25.7 PG (ref 26–34)
MCHC RBC AUTO-ENTMCNC: 30.7 G/DL (ref 30–36.5)
MCV RBC AUTO: 83.9 FL (ref 80–99)
MONOCYTES # BLD: 1.2 K/UL (ref 0–1)
MONOCYTES NFR BLD: 12 % (ref 5–13)
NEUTS SEG # BLD: 5.8 K/UL (ref 1.8–8)
NEUTS SEG NFR BLD: 59 % (ref 32–75)
NRBC # BLD: 0 K/UL (ref 0–0.01)
NRBC BLD-RTO: 0 PER 100 WBC
PLATELET # BLD AUTO: 306 K/UL (ref 150–400)
PMV BLD AUTO: 10.3 FL (ref 8.9–12.9)
POTASSIUM SERPL-SCNC: 4.1 MMOL/L (ref 3.5–5.1)
RBC # BLD AUTO: 3.73 M/UL (ref 4.1–5.7)
SODIUM SERPL-SCNC: 137 MMOL/L (ref 136–145)
WBC # BLD AUTO: 10 K/UL (ref 4.1–11.1)

## 2020-10-26 PROCEDURE — 85025 COMPLETE CBC W/AUTO DIFF WBC: CPT

## 2020-10-26 PROCEDURE — 36415 COLL VENOUS BLD VENIPUNCTURE: CPT

## 2020-10-26 PROCEDURE — 74011250637 HC RX REV CODE- 250/637: Performed by: INTERNAL MEDICINE

## 2020-10-26 PROCEDURE — 97535 SELF CARE MNGMENT TRAINING: CPT

## 2020-10-26 PROCEDURE — 80048 BASIC METABOLIC PNL TOTAL CA: CPT

## 2020-10-26 PROCEDURE — 65270000029 HC RM PRIVATE

## 2020-10-26 PROCEDURE — 2709999900 HC NON-CHARGEABLE SUPPLY

## 2020-10-26 PROCEDURE — 74011250636 HC RX REV CODE- 250/636: Performed by: INTERNAL MEDICINE

## 2020-10-26 PROCEDURE — 87635 SARS-COV-2 COVID-19 AMP PRB: CPT

## 2020-10-26 PROCEDURE — 77030018836 HC SOL IRR NACL ICUM -A

## 2020-10-26 RX ADMIN — HYDROMORPHONE HYDROCHLORIDE 4 MG: 2 TABLET ORAL at 09:29

## 2020-10-26 RX ADMIN — TAMSULOSIN HYDROCHLORIDE 0.4 MG: 0.4 CAPSULE ORAL at 08:27

## 2020-10-26 RX ADMIN — OXYCODONE HYDROCHLORIDE AND ACETAMINOPHEN 2 TABLET: 5; 325 TABLET ORAL at 20:16

## 2020-10-26 RX ADMIN — GABAPENTIN 100 MG: 100 CAPSULE ORAL at 15:38

## 2020-10-26 RX ADMIN — GABAPENTIN 100 MG: 100 CAPSULE ORAL at 08:27

## 2020-10-26 RX ADMIN — HYDROMORPHONE HYDROCHLORIDE 4 MG: 2 TABLET ORAL at 05:55

## 2020-10-26 RX ADMIN — FAMOTIDINE 20 MG: 20 TABLET, FILM COATED ORAL at 17:47

## 2020-10-26 RX ADMIN — NYSTATIN: 100000 POWDER TOPICAL at 17:48

## 2020-10-26 RX ADMIN — PANTOPRAZOLE SODIUM 40 MG: 40 TABLET, DELAYED RELEASE ORAL at 05:56

## 2020-10-26 RX ADMIN — METHADONE HYDROCHLORIDE 140 MG: 10 TABLET ORAL at 08:27

## 2020-10-26 RX ADMIN — FAMOTIDINE 20 MG: 20 TABLET, FILM COATED ORAL at 08:28

## 2020-10-26 RX ADMIN — ASPIRIN 81 MG 81 MG: 81 TABLET ORAL at 08:28

## 2020-10-26 RX ADMIN — HYDROMORPHONE HYDROCHLORIDE 4 MG: 2 TABLET ORAL at 19:00

## 2020-10-26 RX ADMIN — HYDROMORPHONE HYDROCHLORIDE 4 MG: 2 TABLET ORAL at 02:34

## 2020-10-26 RX ADMIN — LEVOTHYROXINE SODIUM 112 MCG: 0.11 TABLET ORAL at 05:56

## 2020-10-26 RX ADMIN — HYDROMORPHONE HYDROCHLORIDE 4 MG: 2 TABLET ORAL at 15:38

## 2020-10-26 RX ADMIN — ENOXAPARIN SODIUM 40 MG: 40 INJECTION SUBCUTANEOUS at 08:29

## 2020-10-26 RX ADMIN — HYDROMORPHONE HYDROCHLORIDE 4 MG: 2 TABLET ORAL at 22:02

## 2020-10-26 RX ADMIN — OXYCODONE HYDROCHLORIDE AND ACETAMINOPHEN 2 TABLET: 5; 325 TABLET ORAL at 13:42

## 2020-10-26 NOTE — PROGRESS NOTES
Ask Mr Jose Cole when his  coming to bring supplies to change foot dressing he stated Svetlana Patricia will be here\" asked if she doesn't show can I use our supplies he stated no his works better.

## 2020-10-26 NOTE — PROGRESS NOTES
Per tech tried to help Paulette Balderas to the bedside commode stated left foot hurts and he doesn't want to go on the bed pan.

## 2020-10-26 NOTE — PROGRESS NOTES
Problem: Pressure Injury - Risk of  Goal: *Prevention of pressure injury  Description: Document Dejuan Scale and appropriate interventions in the flowsheet. Outcome: Progressing Towards Goal  Note: Pressure Injury Interventions:  Sensory Interventions: Chair cushion    Moisture Interventions: Absorbent underpads    Activity Interventions: Chair cushion    Mobility Interventions: Chair cushion    Nutrition Interventions: Document food/fluid/supplement intake    Friction and Shear Interventions: Minimize layers                Problem: Patient Education: Go to Patient Education Activity  Goal: Patient/Family Education  Outcome: Progressing Towards Goal     Problem: Falls - Risk of  Goal: *Absence of Falls  Description: Document Jordon Fall Risk and appropriate interventions in the flowsheet.   Outcome: Progressing Towards Goal  Note: Fall Risk Interventions:  Mobility Interventions: Bed/chair exit alarm    Mentation Interventions: Evaluate medications/consider consulting pharmacy    Medication Interventions: Bed/chair exit alarm    Elimination Interventions: Call light in reach    History of Falls Interventions: Evaluate medications/consider consulting pharmacy

## 2020-10-26 NOTE — PROGRESS NOTES
1930  Verbal shift change report given to Savanna Joseph RN (oncoming nurse) by Margie Gill RN (offgoing nurse). Report included the following information SBAR, Kardex, Intake/Output, MAR, Recent Results and Med Rec Status.

## 2020-10-26 NOTE — PROGRESS NOTES
0730 BP's trending soft. Contacted MD because we have been holding lisinopril past 3 days. RN informed MD of trending BP's over 24 hrs. MD ordered to hold lisinopril. Patient Vitals for the past 24 hrs:   Temp Pulse Resp BP SpO2   10/26/20 0315 98.2 °F (36.8 °C) 60 18 101/66 95 %   10/25/20 2253 98.2 °F (36.8 °C) 68 18 120/71 96 %   10/25/20 1911 98.1 °F (36.7 °C) 69 18 131/88 95 %   10/25/20 1703 98.3 °F (36.8 °C) 70 18 121/76 97 %   10/25/20 1223 98.6 °F (37 °C) 64 18 112/74 96 %   10/25/20 0844 98.5 °F (36.9 °C) 74 18 107/72 96 %     0745 Patient continued to refuse wound care overnight. States his friend/ at home is bringing his products and he doesn't want ours because they don't work and aren't right. Will contact wound care to see about different products?

## 2020-10-26 NOTE — PROGRESS NOTES
PHYSICAL THERAPY:Pt seen this AM.Pt declined mobilizing to chair with lateral transfer secondary to concerns of falling out of chair that he reports happened yesterday. PT will continue to follow.

## 2020-10-26 NOTE — PROGRESS NOTES
Problem: Pain  Goal: *Control of Pain  10/25/2020 2214 by Dawna Gilliam  Outcome: Progressing Towards Goal  10/25/2020 2214 by Dawna Gilliam  Outcome: Progressing Towards Goal  Goal: *PALLIATIVE CARE:  Alleviation of Pain  10/25/2020 2214 by Dawna Gilliam  Outcome: Progressing Towards Goal  10/25/2020 2214 by Dawna Gilliam  Outcome: Progressing Towards Goal

## 2020-10-26 NOTE — PROGRESS NOTES
Per day shift nurse foot dressing need to be changed patient only wants to use product from home waiting on 5 [facility] home  to bring in personal dressing doesn't want to our.

## 2020-10-26 NOTE — PROGRESS NOTES
Gigi Marino [facility] 79  [address]  [phone]      Medical Progress Note      NAME: Lucía Olson   :  1965  MRM:  [id number]    Date of service: 10/26/2020  10:36 AM       Assessment and Plan:   1. Acute on chronic L foot/leg infection: has been to multiple facilities. [facility] surg at [facility] recommended amputation. Prior skin biopsy in 2019 concerning for pyoderma gangrenosum. Will hold on IV abx. ID, vascular surg, and podiatry following. Cont would care per wound care team.  [facility] to take patient. At this point, patient will need good wound care at home, then f/u with dermatology, plastics, and wound care clinic as an outpatient.      2. Acute on Chronic pain/depression/anxiety: cont methadone, neurontin and dilaudid PO.       3. Hx of hemorrhagic CVA w/ L hemiparesis: cont ASA, PT/OT     4. HTN: cont lisinopril     5. Hx of bladder CA: f/u with Urologist in [place]. Cont flomax     6. Hypothyroid: cont synthroid             Subjective:     Chief Complaint[de-identified] Patient was seen and examined as a follow up for leg infection. Chart was reviewed. c/o severe LT leg pain     ROS:  (bold if positive, if negative)    Tolerating PT  Tolerating Diet        Objective:     Last 24hrs VS reviewed since prior progress note.  Most recent are:    Visit Vitals  /84 (BP 1 Location: Right arm, BP Patient Position: At rest)   Pulse 68   Temp 98.3 °F (36.8 °C)   Resp 18   Ht 6' 1\" (1.854 m)   Wt 109.8 kg (242 lb)   SpO2 97%   BMI 31.93 kg/m²     SpO2 Readings from Last 6 Encounters:   10/26/20 97%   10/15/19 99%   19 96%   19 95%   19 99%   05/10/19 92%            Intake/Output Summary (Last 24 hours) at 10/26/2020 1046  Last data filed at 10/26/2020 0736  Gross per 24 hour   Intake --   Output 800 ml   Net -800 ml        Physical Exam:    Gen:  Well-developed, well-nourished, in no acute distress  HEENT:  Pink conjunctivae, PERRL, hearing intact to voice, moist mucous membranes  Neck:  Supple, without masses, thyroid non-tender  Resp:  No accessory muscle use, clear breath sounds without wheezes rales or rhonchi  Card:  No murmurs, normal S1, S2 without thrills, bruits or peripheral edema  Abd:  Soft, non-tender, non-distended, normoactive bowel sounds are present, no palpable organomegaly and no detectable hernias  Lymph:  No cervical or inguinal adenopathy  Musc:  No cyanosis or clubbing  Skin:  Large wound on the LT lower leg  Neuro:  Cranial nerves are grossly intact, no focal motor weakness, follows commands appropriately  Psych:  Good insight, oriented to person, place and time, alert  __________________________________________________________________  Medications Reviewed: (see below)  Medications:     Current Facility-Administered Medications   Medication Dose Route Frequency    oxyCODONE-acetaminophen (PERCOCET) 5-325 mg per tablet 2 Tab  2 Tab Oral Q6H PRN    senna-docusate (PERICOLACE) 8.6-50 mg per tablet 1 Tab  1 Tab Oral BID    HYDROmorphone (DILAUDID) tablet 4 mg  4 mg Oral Q3H PRN    diphenhydrAMINE (BENADRYL) capsule 25 mg  25 mg Oral Q6H PRN    allopurinoL (ZYLOPRIM) tablet 100 mg  100 mg Oral DAILY    pantoprazole (PROTONIX) tablet 40 mg  40 mg Oral ACB    tamsulosin (FLOMAX) capsule 0.4 mg  0.4 mg Oral DAILY    methadone (DOLOPHINE) tablet 140 mg  140 mg Oral DAILY    nicotine (NICODERM CQ) 21 mg/24 hr patch 1 Patch  1 Patch TransDERmal DAILY    lisinopriL (PRINIVIL, ZESTRIL) tablet 20 mg  20 mg Oral DAILY    sodium chloride (NS) flush 5-40 mL  5-40 mL IntraVENous PRN    acetaminophen (TYLENOL) tablet 650 mg  650 mg Oral Q6H PRN    Or    acetaminophen (TYLENOL) suppository 650 mg  650 mg Rectal Q6H PRN    polyethylene glycol (MIRALAX) packet 17 g  17 g Oral DAILY PRN    ondansetron (ZOFRAN ODT) tablet 4 mg  4 mg Oral Q8H PRN    Or    ondansetron (ZOFRAN) injection 4 mg  4 mg IntraVENous Q6H PRN    famotidine (PEPCID) tablet 20 mg  20 mg Oral BID    enoxaparin (LOVENOX) injection 40 mg  40 mg SubCUTAneous DAILY    gabapentin (NEURONTIN) capsule 100 mg  100 mg Oral TID    colchicine tablet 0.6 mg  0.6 mg Oral DAILY PRN    levothyroxine (SYNTHROID) tablet 112 mcg  112 mcg Oral ACB    lidocaine (XYLOCAINE) 2 % jelly   Mucous Membrane PRN    aspirin chewable tablet 81 mg  81 mg Oral DAILY    nystatin (MYCOSTATIN) 100,000 unit/gram powder   Topical BID        Lab Data Reviewed: (see below)  Lab Review:     Recent Labs     10/26/20  0633   WBC 10.0   HGB 9.6*   HCT 31.3*        Recent Labs     10/26/20  0633      K 4.1      CO2 26   GLU 96   BUN 7   CREA 0.74   CA 8.6     Lab Results   Component Value Date/Time    Glucose (POC) 150 (H) 03/19/2018 11:45 AM    Glucose (POC) 123 (H) 03/18/2018 08:55 PM    Glucose (POC) 96 03/16/2018 04:58 PM    Glucose (POC) 116 (H) 12/12/2012 12:42 PM    Glucose (POC) 99 12/11/2012 11:27 PM     No results for input(s): PH, PCO2, PO2, HCO3, FIO2 in the last 72 hours. No results for input(s): INR, INREXT, INREXT in the last 72 hours. All Micro Results     Procedure Component Value Units Date/Time    CULTURE, BLOOD, PERIPHERAL [[id number]] Collected:  10/21/20 0251    Order Status:  Completed Specimen:  Blood Updated:  10/25/20 0602     Special Requests: NO SPECIAL REQUESTS        Culture result: NO GROWTH 4 DAYS       CULTURE, BLOOD, PERIPHERAL [[id number]] Collected:  10/21/20 0251    Order Status:  Completed Specimen:  Blood Updated:  10/25/20 0602     Special Requests: NO SPECIAL REQUESTS        Culture result: NO GROWTH 4 DAYS       CULTURE, Masantiagoshay Oneil [[id number]] Collected:  10/21/20 0730    Order Status:  Canceled Specimen:  Leg           I have reviewed notes of prior 24hr. Other pertinent lab:      Total time spent with patient: [address] discussed with: Patient, Nursing Staff and >50% of time spent in counseling and coordination of care    Discussed:  Care Plan    Prophylaxis:  Lovenox    Disposition:  Home w/Family           ___________________________________________________    Attending Physician: Alexandra Rendon MD

## 2020-10-26 NOTE — PROGRESS NOTES
Bedside, Verbal and Written shift change report given to [id number] (oncoming nurse) by Bessie Patricia (offgoing nurse). Report included the following information SBAR, Kardex, ED Summary, Intake/Output, MAR, Recent Results and Med Rec Status.

## 2020-10-26 NOTE — PROGRESS NOTES
10/26/2020   CARE MANAGEMENT NOTE:  CM reviewed EMR for clinical updates and handoff received from previous . Pt was admitted with left foot/leg infection. Also with hemorrhagic CVA. Reportedly, pt resides with roommates with plan to move. RUR 16%; LOS 5 days    Transition Plan of Care:  1. PT/OT evals complete; pt was mod to min assist for bed mobility and SNF was recommended. CM discussed with pt and he is in agreement. Referrals were sent to [address] [place] [address] [facility], and [facility] per pt choice. 2.  [address] will be needed  3. COVID 19 test for placement will be required    CM will continue to follow pt for SNF placement.   Neelam

## 2020-10-26 NOTE — WOUND CARE
Wound dressing change;  Pain medication given by staff nurse     Left lower leg and dorsal foot wounds- present on admission.  Full thickness wounds- areas of red granulation- less than 50% thick yellow adherent slough on the edges- ludwig wound red and pink with areas of dry skin- tender and painful to touch - tan drainage - no odor  Foot erythemic- edematous   Left posterior knee red with like red yeast like rash  Patient continues to have pain with dressing changes and instructs nurse to apply lidocaine liberally when changing dressings.      Dressing change:  Wounds irrigated with saline- lidocaine and \"Curity\" emulsion dressing applied covered with ABD dressings and taped in place.

## 2020-10-26 NOTE — PROGRESS NOTES
Comprehensive Nutrition Assessment    Type and Reason for Visit: Initial, RD nutrition re-screen/LOS    Nutrition Recommendations/Plan:   1. Continue cardiac diet. 2. Add Ensure HP chocolate TID to promote adequate protein intake. Nutrition Assessment:      10/26; 54 y/o male admitted with foot ulcer. PMH includes HTN, hx of bladder CA. BMI 31.9, c/w class I obesity. Pt reports poor appetite today d/t pain. Also says he is trying not to eat because he can't get up to go to the bathroom. Says he only had coffee for breakfast. Thinks he was eating well before today and at home. Says he ate 100% hamburger for dinner last night. Recorded intakes good, % meals. C/o nausea currently. Says his weight varies. Pt understand importance of protein for healing and is requesting Ensure HP with all meals. Labs- reviewed. Meds- Protonix.     Patient Vitals for the past 168 hrs:   % Diet Eaten   10/25/20 1630 60 %   10/25/20 0947 60 %   10/24/20 1215 100 %   10/24/20 0916 75 %   10/23/20 1013 90 %       Wt Readings from Last 10 Encounters:   10/20/20 109.8 kg (242 lb)   09/09/20 106.6 kg (235 lb)   10/21/19 106.6 kg (235 lb)   10/14/19 106.6 kg (235 lb)   08/08/19 113.4 kg (250 lb)   06/18/19 109.9 kg (242 lb 4.6 oz)   05/20/19 108.9 kg (240 lb)   04/25/19 108.9 kg (240 lb)   04/24/19 108.9 kg (240 lb)   09/28/18 103.4 kg (228 lb)     Malnutrition Assessment:  Malnutrition Status: none    Estimated Daily Nutrient Needs:  Energy (kcal):  6250(0428 x 1.3 AF)  Protein (g):  109(1-1.2 g/kg)       Fluid (ml/day):  2581(1mL/kcal)    Nutrition Related Findings:  LBM 10/23      Wounds:    (vascular leg wound)       Current Nutrition Therapies:  DIET CARDIAC Regular  DIET NUTRITIONAL SUPPLEMENTS Breakfast, Lunch, Dinner; Ensure High Protein    Anthropometric Measures:  · Height:  6' 1\" (185.4 cm)  · Current Body Wt:  109.8 kg (242 lb)   · Admission Body Wt:       · Usual Body Wt:        · Ideal Body Wt:  184 lbs:  131.5 %   · BMI Category:  Obese class 1 (BMI 30.0-34. 9)       Nutrition Diagnosis:   · Increased nutrient needs related to increased demand for energy/nutrients(protein) as evidenced by wounds(foot/leg wound and infection)    Nutrition Interventions:   Food and/or Nutrient Delivery: Continue current diet, Start oral nutrition supplement  Nutrition Education and Counseling: No recommendations at this time  Coordination of Nutrition Care: Continued inpatient monitoring    Goals:  PO intake >75% meals + ONS meeting protein needs next 1-3 days       Nutrition Monitoring and Evaluation:   Food/Nutrient Intake Outcomes: Food and nutrient intake, Supplement intake  Physical Signs/Symptoms Outcomes: Weight, Biochemical data, Skin    Discharge Planning:    Continue oral nutrition supplement, Continue current diet     Electronically signed by Nan Osei RDN on 10/26/2020 at 10:39 AM    Contact: [phone]

## 2020-10-26 NOTE — PROGRESS NOTES
Problem: Self Care Deficits Care Plan (Adult)  Goal: *Acute Goals and Plan of Care (Insert Text)  Description: FUNCTIONAL STATUS PRIOR TO ADMISSION: 1 month ago pt was ambulatory using cane and performed functional mobility without assistance. He was admitted to outside hospital where he reports not mobilizing out of bed x 1 month and sustaining additional injury to LLE at that facility. HOME SUPPORT PRIOR TO ADMISSION: The patient lived with Harley Velázquez to whom he refers as his \". \"    Occupational Therapy Goals  Initiated 10/22/2020  1. Patient will perform upper body dressing, using noe techniques, with supervision/set-up within 7 day(s). 2.  Patient will perform lower body dressing with minimal assistance/contact guard assist within 7 day(s). 3.  Patient will perform toilet transfers, to [facility],  with maximal assistance within 7 day(s). 4.  Patient will perform all aspects of toileting with maximal assistance within 7 day(s). OCCUPATIONAL THERAPY TREATMENT  Patient: Jerel Barriga (76 y.o. male)  Date: 10/26/2020  Diagnosis: Foot ulcer ([facility] [address] [C75.283]  Left foot infection [L08.9]   <principal problem not specified>       Precautions: Fall, Bed Alarm, Skin  Chart, occupational therapy assessment, plan of care, and goals were reviewed. ASSESSMENT  Patient continues with skilled OT services and is progressing towards goals. Pt [facility] gown seated edge of bed contact guard. He was able to doff/don R sock as well. Current Level of Function Impacting Discharge (ADLs): UB dress contact guard, mod I doff/don sock R LE    Other factors to consider for discharge:          PLAN :  Patient continues to benefit from skilled intervention to address the above impairments. Continue treatment per established plan of care. to address goals.     Recommend with staff: ADl's seated edge of bed there ex, there act    Recommend next OT session: Cont towards goals    Recommendation for discharge: (in order for the patient to meet his/her long term goals)  Therapy up to 5 days/week in SNF setting    This discharge recommendation:  Has not yet been discussed the attending provider and/or case management    IF patient discharges home will need the following DME:        SUBJECTIVE:   Patient stated I can do most anything if I want to    OBJECTIVE DATA SUMMARY:   Cognitive/Behavioral Status:  Neurologic State: Alert  Orientation Level: Oriented X4  Cognition: Appropriate decision making             Functional Mobility and Transfers for ADLs:  Bed Mobility:   Pt already seated edge of bed    Transfers:      Not tested, he states he fell over the weekend?getting out of bed       Balance:Intact sitting balance       ADL Intervention:       Upper Body Dressing Assistance  Dressing Assistance: Contact guard assistance  [facility] Gown: Contact guard assistance    Lower Body Dressing Assistance  Socks: Modified independent(R sock)  Leg Crossed Method Used: No  Position Performed: Seated edge of bed         Activity Tolerance:   Fair  Please refer to the flowsheet for vital signs taken during this treatment. After treatment patient left in no apparent distress:   Call bell within reach    COMMUNICATION/COLLABORATION:   The patients plan of care was discussed with: Physical therapy assistant, Occupational therapist, and Registered nurse.      SANA Jarvis/L  Time Calculation: 20 mins

## 2020-10-27 LAB
BACTERIA SPEC CULT: NORMAL
BACTERIA SPEC CULT: NORMAL
HEALTH STATUS, XMCV2T: NORMAL
SARS-COV-2, COV2: NOT DETECTED
SERVICE CMNT-IMP: NORMAL
SERVICE CMNT-IMP: NORMAL
SOURCE, COVRS: NORMAL
SPECIMEN SOURCE, FCOV2M: NORMAL
SPECIMEN TYPE, XMCV1T: NORMAL

## 2020-10-27 PROCEDURE — 65270000029 HC RM PRIVATE

## 2020-10-27 PROCEDURE — 74011250637 HC RX REV CODE- 250/637: Performed by: INTERNAL MEDICINE

## 2020-10-27 PROCEDURE — 77030018836 HC SOL IRR NACL ICUM -A

## 2020-10-27 PROCEDURE — 97110 THERAPEUTIC EXERCISES: CPT

## 2020-10-27 PROCEDURE — 51798 US URINE CAPACITY MEASURE: CPT

## 2020-10-27 PROCEDURE — 74011250636 HC RX REV CODE- 250/636: Performed by: INTERNAL MEDICINE

## 2020-10-27 RX ORDER — OXYCODONE AND ACETAMINOPHEN 10; 325 MG/1; MG/1
1 TABLET ORAL
Status: DISCONTINUED | OUTPATIENT
Start: 2020-10-27 | End: 2020-10-31 | Stop reason: HOSPADM

## 2020-10-27 RX ADMIN — FAMOTIDINE 20 MG: 20 TABLET, FILM COATED ORAL at 18:37

## 2020-10-27 RX ADMIN — GABAPENTIN 100 MG: 100 CAPSULE ORAL at 09:05

## 2020-10-27 RX ADMIN — ASPIRIN 81 MG 81 MG: 81 TABLET ORAL at 09:05

## 2020-10-27 RX ADMIN — HYDROMORPHONE HYDROCHLORIDE 4 MG: 2 TABLET ORAL at 06:28

## 2020-10-27 RX ADMIN — HYDROMORPHONE HYDROCHLORIDE 4 MG: 2 TABLET ORAL at 00:58

## 2020-10-27 RX ADMIN — OXYCODONE HYDROCHLORIDE AND ACETAMINOPHEN 2 TABLET: 5; 325 TABLET ORAL at 10:13

## 2020-10-27 RX ADMIN — HYDROMORPHONE HYDROCHLORIDE 4 MG: 2 TABLET ORAL at 12:40

## 2020-10-27 RX ADMIN — OXYCODONE HYDROCHLORIDE AND ACETAMINOPHEN 1 TABLET: 10; 325 TABLET ORAL at 22:32

## 2020-10-27 RX ADMIN — NYSTATIN: 100000 POWDER TOPICAL at 09:08

## 2020-10-27 RX ADMIN — OXYCODONE HYDROCHLORIDE AND ACETAMINOPHEN 1 TABLET: 10; 325 TABLET ORAL at 18:37

## 2020-10-27 RX ADMIN — ENOXAPARIN SODIUM 40 MG: 40 INJECTION SUBCUTANEOUS at 09:07

## 2020-10-27 RX ADMIN — LISINOPRIL 20 MG: 20 TABLET ORAL at 12:35

## 2020-10-27 RX ADMIN — HYDROMORPHONE HYDROCHLORIDE 4 MG: 2 TABLET ORAL at 19:53

## 2020-10-27 RX ADMIN — HYDROMORPHONE HYDROCHLORIDE 4 MG: 2 TABLET ORAL at 15:35

## 2020-10-27 RX ADMIN — TAMSULOSIN HYDROCHLORIDE 0.4 MG: 0.4 CAPSULE ORAL at 09:05

## 2020-10-27 RX ADMIN — LEVOTHYROXINE SODIUM 112 MCG: 0.11 TABLET ORAL at 06:28

## 2020-10-27 RX ADMIN — METHADONE HYDROCHLORIDE 140 MG: 10 TABLET ORAL at 09:06

## 2020-10-27 RX ADMIN — HYDROMORPHONE HYDROCHLORIDE 4 MG: 2 TABLET ORAL at 09:07

## 2020-10-27 RX ADMIN — FAMOTIDINE 20 MG: 20 TABLET, FILM COATED ORAL at 09:05

## 2020-10-27 RX ADMIN — OXYCODONE HYDROCHLORIDE AND ACETAMINOPHEN 2 TABLET: 5; 325 TABLET ORAL at 03:28

## 2020-10-27 RX ADMIN — PANTOPRAZOLE SODIUM 40 MG: 40 TABLET, DELAYED RELEASE ORAL at 06:29

## 2020-10-27 RX ADMIN — NYSTATIN: 100000 POWDER TOPICAL at 18:38

## 2020-10-27 NOTE — PROGRESS NOTES
Problem: Pressure Injury - Risk of  Goal: *Prevention of pressure injury  Description: Document Dejuan Scale and appropriate interventions in the flowsheet. Outcome: Not Progressing Towards Goal  Note: Pressure Injury Interventions:  Sensory Interventions: Assess changes in LOC, Assess need for specialty bed, Discuss PT/OT consult with provider, Float heels, Keep linens dry and wrinkle-free, Check visual cues for pain, Minimize linen layers, Maintain/enhance activity level, Monitor skin under medical devices, Pad between skin to skin, Pressure redistribution bed/mattress (bed type), Turn and reposition approx. every two hours (pillows and wedges if needed)    Moisture Interventions: Absorbent underpads    Activity Interventions: Increase time out of bed, Pressure redistribution bed/mattress(bed type), PT/OT evaluation    Mobility Interventions: Float heels, HOB 30 degrees or less, Pressure redistribution bed/mattress (bed type), PT/OT evaluation, Turn and reposition approx.  every two hours(pillow and wedges)    Nutrition Interventions: Document food/fluid/supplement intake, Discuss nutritional consult with provider, Offer support with meals,snacks and hydration    Friction and Shear Interventions: Apply protective barrier, creams and emollients, HOB 30 degrees or less, Lift sheet                Problem: Patient Education: Go to Patient Education Activity  Goal: Patient/Family Education  Outcome: Not Progressing Towards Goal     Problem: Pain  Goal: *Control of Pain  Outcome: Not Progressing Towards Goal     Problem: Patient Education: Go to Patient Education Activity  Goal: Patient/Family Education  Outcome: Not Progressing Towards Goal

## 2020-10-27 NOTE — PROGRESS NOTES
Bedside and Verbal shift change report given to America Tyson RN (oncoming nurse) by Caitlyn Humphries RN (offgoing nurse). Report included the following information SBAR, Kardex, Intake/Output and MAR.

## 2020-10-27 NOTE — WOUND CARE
Right leg wound dressing changed. Pain medication given 30 minutes prior per staff nurse. Patient requested wound dressing completed 45 minutes after pain med given.     Left lower leg and dorsal foot wounds- present on admission.  Full thickness wounds- areas of red granulation-  80% thick yellow adherent slough throughout wound area- ludwig wound reddened with areas of dry skin- tender and painful to touch - tan drainage - no odor  Foot erythemic- edematous   Left posterior knee red with like red yeast like rash  Patient continues to have pain with dressing changes and instructs nurse to apply lidocaine liberally when changing dressings.      Dressing change:  Wounds irrigated with saline- lidocaine and \"Curity\" emulsion dressing applied covered with ABD dressings and taped in place.

## 2020-10-27 NOTE — PROGRESS NOTES
Bedside and Verbal shift change report given to Landon Donis RN (oncoming nurse) by Arthur Snow RN (offgoing nurse). Report included the following information SBAR, Kardex and MAR.

## 2020-10-27 NOTE — PROGRESS NOTES
10/27/2020   CARE MANAGEMENT NOTE:  CM reviewed EMR for clinical updates. Pt was admitted with left foot/leg infection. Also with hemorrhagic CVA. Reportedly, pt resides with roommates with plan to move.     RUR 16%; LOS 6 days     Transition Plan of Care:  1. SNF - referrals were sent to [address] [place] [address], and [facility] per pt choice. 2.  [address] will be needed  3. COVID 19 test for placement was completed on 10/26 and results are pending     CM will continue to follow pt for SNF placement.   Neelam

## 2020-10-27 NOTE — PROGRESS NOTES
Problem: Mobility Impaired (Adult and Pediatric)  Goal: *Acute Goals and Plan of Care (Insert Text)  Description: FUNCTIONAL STATUS PRIOR TO ADMISSION: 1 month ago pt was ambulatory using cane and performed functional mobility without assistance. He was admitted to outside hospital where he reports not mobilizing out of bed x 1 month and sustaining additional injury to LLE at that facility. HOME SUPPORT PRIOR TO ADMISSION: The patient lived with Esha Mahan to whom he refers as his \". \"    Physical Therapy Goals  Initiated 10/22/2020  1. Patient will move from supine to sit and sit to supine  in bed with supervision/set-up within 7 day(s). 2.  Patient will transfer from bed to chair and chair to bed with maximal assistance using the least restrictive device within 7 day(s). 3.  Patient will demonstrate independence with HEP for strengthening within 7 days. Note:   PHYSICAL THERAPY TREATMENT  Patient: Jhon Almanza (72 y.o. male)  Date: 10/27/2020  Diagnosis: [facility] [F52.071]  Left foot infection [L08.9]   <principal problem not specified>       Precautions: Fall, Bed Alarm, Skin  Chart, physical therapy assessment, plan of care and goals were reviewed. ASSESSMENT  Patient continues with skilled PT services. Pt declined lateral transfers bed to chair. Pt agreeable to performing LE exercise on right but not to painful left  LE. Pt performed heal slides ankle pumps and hip abd/add on right LE with cues. Pt also able to pull himself up to top of bed in supine independently. Pt progress slow. Continue goals. PLAN :  Patient continues to benefit from skilled intervention to address the above impairments. Continue treatment per established plan of care. to address goals.     Recommendation for discharge: (in order for the patient to meet his/her long term goals)  Therapy up to 5 days/week in SNF setting    This discharge recommendation:  Has been made in collaboration with the attending provider and/or case management    IF patient discharges home will need the following DME:        SUBJECTIVE:       OBJECTIVE DATA SUMMARY:   Critical Behavior:  Neurologic State: Alert, Appropriate for age  Orientation Level: Oriented X4  Cognition: Follows commands, Impulsive     Functional Mobility Training:  Bed Mobility:      Pt scoots to [facility] independently. Activity Tolerance:   Fair  Please refer to the flowsheet for vital signs taken during this treatment.     After treatment patient left in no apparent distress:   Supine in bed    COMMUNICATION/COLLABORATION:   The patients plan of care was discussed with: Physical therapist.     Evelyn Guerra PTA   Time Calculation: 11 mins

## 2020-10-27 NOTE — PROGRESS NOTES
2000- Upon initial assessment, patient is alert and oriented x4. Sitting on side of bed to use urinal. Pt is refusing to allow this RN to turn on the bed alarm for his safety. RN attempted to educate patient on the purpose of bed alarm but he began raising his voice and getting angry. Charge nurse notified that patient is refusing bed alarm. 2200- Pt's left foot wound is saturated with serosanguinous drainage and patient is refusing to allow RN to reinforce or change dressing. Pt requesting pain medication at this time, prn dilaudid administered per order. Patient refusing to take Neurontin that is ordered. Pt reports \"it makes me go to the bathroom, I don't like it. \"

## 2020-10-27 NOTE — PROGRESS NOTES
[facility]  [address] [facility], [address] [place] [address]  [phone]      Medical Progress Note      NAME: Jerel Barriga   :  1965  MRM:  [id number]    Date/Time of service: 10/27/2020  11:35 AM       Subjective:     Chief Complaint:  Patient was personally seen and examined by me during this time period. Chart reviewed. C/o pain. Somewhat improved with oral dilaudid        Objective:       Vitals:       Last 24hrs VS reviewed since prior progress note. Most recent are:    Visit Vitals  BP (!) 141/82 (BP 1 Location: Right arm)   Pulse 74   Temp 98.3 °F (36.8 °C)   Resp 18   Ht 6' 1\" (1.854 m)   Wt 109.8 kg (242 lb)   SpO2 96%   BMI 31.93 kg/m²     SpO2 Readings from Last 6 Encounters:   10/27/20 96%   10/15/19 99%   19 96%   19 95%   19 99%   05/10/19 92%            Intake/Output Summary (Last 24 hours) at 10/27/2020 1135  Last data filed at 10/27/2020 1013  Gross per 24 hour   Intake 688 ml   Output 1250 ml   Net -562 ml        Exam:     Physical Exam:    Gen:  Disheveled, ill-appearing, obese, NAD  HEENT:  Pink conjunctivae, PERRL, hearing intact to voice, moist mucous membranes  Neck:  Supple, without masses, thyroid non-tender  Resp:  No accessory muscle use, clear breath sounds without wheezes rales or rhonchi  Card:  No murmurs, normal S1, S2 without thrills, 1+ edema  Abd:  Soft, non-tender, non-distended, normoactive bowel sounds are present  Musc:  No cyanosis or clubbing  Skin:  Large open wound from L ankle to mid leg with some necrosis.   Some area with good granulation tissue  Neuro:  Cranial nerves 3-12 are grossly intact, chronic L hemiparesis, follows commands appropriately  Psych:  poor insight, oriented to person, place and time, alert    Medications Reviewed: (see below)    Lab Data Reviewed: (see below)    ______________________________________________________________________    Medications:     Current Facility-Administered Medications Medication Dose Route Frequency    oxyCODONE-acetaminophen (PERCOCET) 5-325 mg per tablet 2 Tab  2 Tab Oral Q6H PRN    senna-docusate (PERICOLACE) 8.6-50 mg per tablet 1 Tab  1 Tab Oral BID    HYDROmorphone (DILAUDID) tablet 4 mg  4 mg Oral Q3H PRN    diphenhydrAMINE (BENADRYL) capsule 25 mg  25 mg Oral Q6H PRN    allopurinoL (ZYLOPRIM) tablet 100 mg  100 mg Oral DAILY    pantoprazole (PROTONIX) tablet 40 mg  40 mg Oral ACB    tamsulosin (FLOMAX) capsule 0.4 mg  0.4 mg Oral DAILY    methadone (DOLOPHINE) tablet 140 mg  140 mg Oral DAILY    nicotine (NICODERM CQ) 21 mg/24 hr patch 1 Patch  1 Patch TransDERmal DAILY    lisinopriL (PRINIVIL, ZESTRIL) tablet 20 mg  20 mg Oral DAILY    sodium chloride (NS) flush 5-40 mL  5-40 mL IntraVENous PRN    acetaminophen (TYLENOL) tablet 650 mg  650 mg Oral Q6H PRN    Or    acetaminophen (TYLENOL) suppository 650 mg  650 mg Rectal Q6H PRN    polyethylene glycol (MIRALAX) packet 17 g  17 g Oral DAILY PRN    ondansetron (ZOFRAN ODT) tablet 4 mg  4 mg Oral Q8H PRN    Or    ondansetron (ZOFRAN) injection 4 mg  4 mg IntraVENous Q6H PRN    famotidine (PEPCID) tablet 20 mg  20 mg Oral BID    enoxaparin (LOVENOX) injection 40 mg  40 mg SubCUTAneous DAILY    gabapentin (NEURONTIN) capsule 100 mg  100 mg Oral TID    colchicine tablet 0.6 mg  0.6 mg Oral DAILY PRN    levothyroxine (SYNTHROID) tablet 112 mcg  112 mcg Oral ACB    lidocaine (XYLOCAINE) 2 % jelly   Mucous Membrane PRN    aspirin chewable tablet 81 mg  81 mg Oral DAILY    nystatin (MYCOSTATIN) 100,000 unit/gram powder   Topical BID          Lab Review:     Recent Labs     10/26/20  0633   WBC 10.0   HGB 9.6*   HCT 31.3*        Recent Labs     10/26/20  0633      K 4.1      CO2 26   GLU 96   BUN 7   CREA 0.74   CA 8.6     Lab Results   Component Value Date/Time    Glucose (POC) 150 (H) 03/19/2018 11:45 AM    Glucose (POC) 123 (H) 03/18/2018 08:55 PM    Glucose (POC) 96 03/16/2018 04:58 PM    Glucose (POC) 116 (H) 12/12/2012 12:42 PM    Glucose (POC) 99 12/11/2012 11:27 PM          Assessment / Plan:     52 yo hx of HTN, CVA w/ L hemiparesis, bladder CA, depression, chronic pain, chronic L foot/leg infection, presented w/ chronic L leg wound    1) Acute on chronic L foot/leg wound: Not infected. Has been to multiple facilities. Plastics surg at [facility] recommended amputation. Prior skin biopsy in April 2019 concerning for pyoderma gangrenosum. Not on abx. ID, vascular surg, and podiatry following. Cont would care per wound care team.  [facility] declined to take patient. At this point, patient will need good wound care at a SNF, then f/u with dermatology, plastics, and wound care clinic as an outpatient. CM working on placement    2) Acute on Chronic pain/depression/anxiety: cont methadone, neurontin, PO dilaudid prn    3) Hx of hemorrhagic CVA w/ L hemiparesis: cont ASA, PT/OT    4) HTN: cont lisinopril    5) Hx of bladder CA: f/u with Urologist in [place].   Cont flomax    6) Hypothyroid: cont synthroid     Total time spent with patient: 20 Minutes **I personally saw and examined the patient during this time period**                 Care Plan discussed with: Patient, nursing    Discussed:  Care Plan    Prophylaxis:  Lovenox    Disposition:  SNF pending           ___________________________________________________    Attending Physician: Anthony Snyder MD

## 2020-10-28 PROCEDURE — 97535 SELF CARE MNGMENT TRAINING: CPT

## 2020-10-28 PROCEDURE — 74011250637 HC RX REV CODE- 250/637: Performed by: INTERNAL MEDICINE

## 2020-10-28 PROCEDURE — 74011250636 HC RX REV CODE- 250/636: Performed by: INTERNAL MEDICINE

## 2020-10-28 PROCEDURE — 99231 SBSQ HOSP IP/OBS SF/LOW 25: CPT | Performed by: INTERNAL MEDICINE

## 2020-10-28 PROCEDURE — 65270000029 HC RM PRIVATE

## 2020-10-28 PROCEDURE — 77030018836 HC SOL IRR NACL ICUM -A

## 2020-10-28 RX ADMIN — OXYCODONE HYDROCHLORIDE AND ACETAMINOPHEN 1 TABLET: 10; 325 TABLET ORAL at 21:35

## 2020-10-28 RX ADMIN — FAMOTIDINE 20 MG: 20 TABLET, FILM COATED ORAL at 09:14

## 2020-10-28 RX ADMIN — FAMOTIDINE 20 MG: 20 TABLET, FILM COATED ORAL at 17:08

## 2020-10-28 RX ADMIN — OXYCODONE HYDROCHLORIDE AND ACETAMINOPHEN 1 TABLET: 10; 325 TABLET ORAL at 15:13

## 2020-10-28 RX ADMIN — HYDROMORPHONE HYDROCHLORIDE 4 MG: 2 TABLET ORAL at 12:59

## 2020-10-28 RX ADMIN — HYDROMORPHONE HYDROCHLORIDE 4 MG: 2 TABLET ORAL at 01:30

## 2020-10-28 RX ADMIN — HYDROMORPHONE HYDROCHLORIDE 4 MG: 2 TABLET ORAL at 03:58

## 2020-10-28 RX ADMIN — NYSTATIN: 100000 POWDER TOPICAL at 17:20

## 2020-10-28 RX ADMIN — HYDROMORPHONE HYDROCHLORIDE 4 MG: 2 TABLET ORAL at 20:13

## 2020-10-28 RX ADMIN — OXYCODONE HYDROCHLORIDE AND ACETAMINOPHEN 1 TABLET: 10; 325 TABLET ORAL at 06:12

## 2020-10-28 RX ADMIN — GABAPENTIN 100 MG: 100 CAPSULE ORAL at 15:13

## 2020-10-28 RX ADMIN — METHADONE HYDROCHLORIDE 140 MG: 10 TABLET ORAL at 09:24

## 2020-10-28 RX ADMIN — TAMSULOSIN HYDROCHLORIDE 0.4 MG: 0.4 CAPSULE ORAL at 09:12

## 2020-10-28 RX ADMIN — GABAPENTIN 100 MG: 100 CAPSULE ORAL at 09:15

## 2020-10-28 RX ADMIN — PANTOPRAZOLE SODIUM 40 MG: 40 TABLET, DELAYED RELEASE ORAL at 06:45

## 2020-10-28 RX ADMIN — ENOXAPARIN SODIUM 40 MG: 40 INJECTION SUBCUTANEOUS at 09:16

## 2020-10-28 RX ADMIN — HYDROMORPHONE HYDROCHLORIDE 4 MG: 2 TABLET ORAL at 17:19

## 2020-10-28 RX ADMIN — OXYCODONE HYDROCHLORIDE AND ACETAMINOPHEN 1 TABLET: 10; 325 TABLET ORAL at 10:57

## 2020-10-28 RX ADMIN — LISINOPRIL 20 MG: 20 TABLET ORAL at 09:37

## 2020-10-28 RX ADMIN — ASPIRIN 81 MG 81 MG: 81 TABLET ORAL at 09:11

## 2020-10-28 RX ADMIN — HYDROMORPHONE HYDROCHLORIDE 4 MG: 2 TABLET ORAL at 09:09

## 2020-10-28 NOTE — PROGRESS NOTES
2310: Patient refusing to allow this RN to initiate the bed alarm. Will continue to monitor him closely. 6440: Patient has been refusing tech to take vitals. States he doesn't want him in his room again. 0896: patient stated he needed to use the bedside commode. Advised the patient to use the bedside commode due to not having the proper staff to assist him to the bedside commode. Patient is a x3 assist at least. Patient refused to have the only male tech in his room. Educated patient again that the bedside commode was the best option and he still refused. Stated \"Well I guess I just shit in the bed cause I have no other option\".

## 2020-10-28 NOTE — WOUND CARE
Right leg wound dressing changed. Pain medication given 60 minutes prior by staff nurse.     Left lower leg and dorsal foot wounds- present on admission. Full thickness wounds- areas of red granulation-  80% thick yellow adherent slough throughout wound area- ludwig wound reddened with areas of dry skin- tender and painful to touch - tan drainage - no odor  Foot erythemic- edematous   Left posterior knee red with like red yeast like rash  Patient continues to have pain with dressing changes and instructs nurse to apply lidocaine when changing dressings.      Dressing change:  Wounds irrigated with saline- lidocaine and emulsion dressing applied covered with ABD dressings and taped in place. DR. Raman Giron at bedside for dressing change.

## 2020-10-28 NOTE — PROGRESS NOTES
Problem: Pressure Injury - Risk of  Goal: *Prevention of pressure injury  Description: Document Dejuan Scale and appropriate interventions in the flowsheet. Outcome: Progressing Towards Goal  Note: Pressure Injury Interventions:  Sensory Interventions: Assess changes in LOC, Assess need for specialty bed    Moisture Interventions: Absorbent underpads    Activity Interventions: Pressure redistribution bed/mattress(bed type)    Mobility Interventions: Pressure redistribution bed/mattress (bed type), Turn and reposition approx. every two hours(pillow and wedges)    Nutrition Interventions: Document food/fluid/supplement intake    Friction and Shear Interventions: Minimize layers                Problem: Falls - Risk of  Goal: *Absence of Falls  Description: Document Jordon Fall Risk and appropriate interventions in the flowsheet.   Outcome: Progressing Towards Goal  Note: Fall Risk Interventions:  Mobility Interventions: Bed/chair exit alarm    Mentation Interventions: Bed/chair exit alarm    Medication Interventions: Bed/chair exit alarm    Elimination Interventions: Patient to call for help with toileting needs, Call light in reach    History of Falls Interventions: Bed/chair exit alarm

## 2020-10-28 NOTE — PROGRESS NOTES
Bedside and Verbal shift change report given to David David RN (oncoming nurse) by Abiel St RN (offgoing nurse). Report included the following information SBAR, Intake/Output, MAR and Recent Results.  Noted dressing change completed today by wound team, continued to medicated pt with PRN analgesics per [facility]

## 2020-10-28 NOTE — PROGRESS NOTES
10/28/2020   CARE MANAGEMENT NOTE:  CM reviewed EMR for clinical updates.  Pt was admitted with left foot/leg infection.  Also with hemorrhagic CVA. Reportedly, pt resides with roommates with plans to move to undisclosed location.     RUR 16%; LOS 7 days     Transition Plan of Care:  1. CM was notified via email by [facility] that pt's entire hospital stay since 10/21 has been denied 2/2 admission is \"not medically necessary. \" Peer to peer upheld denial.  2.  Plan was SNF per PT recommendations on 10/27 as pt was non-ambulatory and only scooted to [facility]. Referrals were sent to [place] (no decision), [place] (no decision), and [address] [place] [address],[address] (denied) per pt choice.   3.  COVID 19 test for placement was completed on 10/26 and results are negative     CM notified Clinical lead and CM supervisor for direction re: hospital denial.  Neelam

## 2020-10-28 NOTE — PROGRESS NOTES
Bedside and Verbal shift change report given to Shilpi (oncoming nurse) by Anibal Owens (offgoing nurse). Report included the following information SBAR, Intake/Output, MAR and Recent Results.

## 2020-10-28 NOTE — PROGRESS NOTES
Problem: Self Care Deficits Care Plan (Adult)  Goal: *Acute Goals and Plan of Care (Insert Text)  Description: FUNCTIONAL STATUS PRIOR TO ADMISSION: 1 month ago pt was ambulatory using cane and performed functional mobility without assistance. He was admitted to outside hospital where he reports not mobilizing out of bed x 1 month and sustaining additional injury to LLE at that facility. HOME SUPPORT PRIOR TO ADMISSION: The patient lived with Kristel Romero to whom he refers as his \". \"    Occupational Therapy Goals  Initiated 10/22/2020  1. Patient will perform upper body dressing, using noe techniques, with supervision/set-up within 7 day(s). 2.  Patient will perform lower body dressing with minimal assistance/contact guard assist within 7 day(s). 3.  Patient will perform toilet transfers, to [facility],  with maximal assistance within 7 day(s). 4.  Patient will perform all aspects of toileting with maximal assistance within 7 day(s). OCCUPATIONAL THERAPY TREATMENT  Patient: Ajit Orona (45 y.o. male)  Date: 10/28/2020  Diagnosis: Foot ulcer ([facility] [F82.970]  Left foot infection [L08.9]   <principal problem not specified>       Precautions: Fall, Bed Alarm, Skin  Chart, occupational therapy assessment, plan of care, and goals were reviewed. ASSESSMENT  Patient continues with skilled OT services and is progressing towards goals. Pt sits up edge of bed without assist. He was able to doff/don gown himself in prep for pullover shirt. Pt manages sock mod I. Pt stated \"lift team\" assisted him to [facility] earlier. Pt declined further OOB activity. Current Level of Function Impacting Discharge (ADLs): set up UB dress, mod I sock    Other factors to consider for discharge:          PLAN :  Patient continues to benefit from skilled intervention to address the above impairments. Continue treatment per established plan of care. to address goals.     Recommend with staff: ADl's seated edge of bed    Recommend next OT session: cont towards goals    Recommendation for discharge: (in order for the patient to meet his/her long term goals)  To be determined: per MD    This discharge recommendation:  Has not yet been discussed the attending provider and/or case management    IF patient discharges home will need the following DME: Drop arm BSC       SUBJECTIVE:   Patient stated [employer] helped me to the bedside commode earlier.     OBJECTIVE DATA SUMMARY:   Cognitive/Behavioral Status:  Neurologic State: Alert  Orientation Level: Oriented X4  Cognition: Follows commands             Functional Mobility and Transfers for ADLs:  Bed Mobility:  Supine to Sit: Contact guard assistance    Transfers:   Not tested as pt declined OOB activity          Balance:Intact sititng balance       ADL Intervention:       Upper Body Dressing Assistance  Dressing Assistance: [facility]: Set-up            Activity Tolerance:   Fair  Please refer to the flowsheet for vital signs taken during this treatment. After treatment patient left in no apparent distress:   Supine in bed    COMMUNICATION/COLLABORATION:   The patients plan of care was discussed with: Occupational therapist and Registered nurse.      SANA Jarvis/L  Time Calculation: 15 mins

## 2020-10-28 NOTE — PROGRESS NOTES
Comprehensive Nutrition Assessment    Type and Reason for Visit: Reassess    Nutrition Recommendations/Plan:   1. Continue cardiac diet. 2. Continue Ensure HP chocolate TID to promote adequate protein intake. Nutrition Assessment:      10/18: F/u. Pt says he didn't eat anything for breakfast except the Ensure because he isn't able to get up to use the bathroom so he just won't eat. Per RN student, lift team is coming up to help pt to the bedside commode. Recorded intakes good, % meals. Says he likes Ensure and is drinking them all for his protein for wound healing. Will continue to send. Labs and meds reviewed. 10/26; 52 y/o male admitted with foot ulcer. PMH includes HTN, hx of bladder CA. BMI 31.9, c/w class I obesity. Pt reports poor appetite today d/t pain. Also says he is trying not to eat because he can't get up to go to the bathroom. Says he only had coffee for breakfast. Thinks he was eating well before today and at home. Says he ate 100% hamburger for dinner last night. Recorded intakes good, % meals. C/o nausea currently. Says his weight varies. Pt understand importance of protein for healing and is requesting Ensure HP with all meals. Labs- reviewed. Meds- Protonix. Patient Vitals for the past 168 hrs:   % Diet Eaten   10/27/20 1140 100 %   10/27/20 0900 100 %   10/26/20 1640 80 %   10/26/20 1125 50 %   10/25/20 1630 60 %   10/25/20 0947 60 %   10/24/20 1215 100 %   10/24/20 0916 75 %   10/23/20 1013 90 %     Weight hx:   Wt Readings from Last 10 Encounters:   10/20/20 109.8 kg (242 lb)   09/09/20 106.6 kg (235 lb)   10/21/19 106.6 kg (235 lb)   10/14/19 106.6 kg (235 lb)   08/08/19 113.4 kg (250 lb)   06/18/19 109.9 kg (242 lb 4.6 oz)   05/20/19 108.9 kg (240 lb)   04/25/19 108.9 kg (240 lb)   04/24/19 108.9 kg (240 lb)   09/28/18 103.4 kg (228 lb)     Malnutrition Assessment:  Malnutrition Status: none    Estimated Daily Nutrient Needs:  Energy (kcal):  3309(2380 x 1.3 AF)  Protein (g):  109(1-1.2 g/kg)       Fluid (ml/day):  2581(1mL/kcal)    Nutrition Related Findings:  LBM 10/26      Wounds:    (vascular leg wound)       Current Nutrition Therapies:  DIET CARDIAC Regular  DIET NUTRITIONAL SUPPLEMENTS Breakfast, Lunch, Dinner; Ensure High Protein  DIET ONE TIME MESSAGE    Anthropometric Measures:  · Height:  6' 1\" (185.4 cm)  · Current Body Wt:  109.8 kg (242 lb)   · Admission Body Wt:       · Usual Body Wt:        · Ideal Body Wt:  184 lbs:  131.5 %   · BMI Category:  Obese class 1 (BMI 30.0-34. 9)       Nutrition Diagnosis:   · Increased nutrient needs related to increased demand for energy/nutrients(protein) as evidenced by wounds(foot/leg wound and infection)    10/28: Nutrition dx continues.     Nutrition Interventions:   Food and/or Nutrient Delivery: Continue current diet, Continue oral nutrition supplement  Nutrition Education and Counseling: No recommendations at this time  Coordination of Nutrition Care: Continued inpatient monitoring    Goals:  PO intake >75% meals + ONS next 5-7 days       Nutrition Monitoring and Evaluation:   Food/Nutrient Intake Outcomes: Food and nutrient intake, Supplement intake  Physical Signs/Symptoms Outcomes: Weight, Biochemical data, Skin    Discharge Planning:    Continue oral nutrition supplement, Continue current diet     Electronically signed by Chandu Madison RDN on 10/28/2020 at 10:39 AM    Contact: [phone]

## 2020-10-28 NOTE — PROGRESS NOTES
5:59 PM  Plan A is SNF  Plan B is home health. I have sent a referral to [address] [place] [address] in [facility] for PT OT Aide RN and MSW. CM will continue to follow. JUVENCIO Cates     5:25 PM  [facility], [facility], [email or web address], [facility], [facility] and [facility] SNFs have all declined the patient. Dax asked for information to be faxed to [phone]. I will follow up with other facilities that have not responded. Voice mail messages have been left. Case Management will continue to follow. JUVENCIO Cates     11:06 AM  Also sent referrals to [facility], [facility] and [place]. JUVENCIO Cates       Referrals have been sent to the following SNFs [address], Lamine Pritchard, [address] [facility], [address] [facility] [address], [facility], [address] [facility]. Patient will have to have insurance authorization. Case management  will continue to follow and assist with disposition plans.  JUVENCIO Cates

## 2020-10-28 NOTE — PROGRESS NOTES
[facility] Progress Note           Lizz Sandoval DO    [phone] Office  [phone]  Fax    10/28/2020      Assessment & Plan:   · Chronic left foot wound. The patient completed a 2-week course of ciprofloxacin, amoxicillin, and minocycline less than 1 week PTA. Patient refused MRI and EUGENE   Vascular surgery recommended BKA. Per discussion with podiatry concern has been raised for pyoderma gangrenosum in the past.  Recommend outpatient dermatology evaluation versus transfer to tertiary center such as [facility] or [facility] ([facility] and [facility] declined transfer)  LLE examined with wound care today. No signs of ongoing infection. WBC has normalized and remains stable off of antibiotics. Monitor off antibiotics. Continue local wound care   · Vancomycin and sulfa allergies. Ciprofloxacin is listed as an allergy however he just finished a course of this antibiotic  · Leukocytosis. Resolved off of antibiotics . Doubt infectious etiology. Subjective:     Complaining of leg pain    Objective:     Vitals:   Visit Vitals  /79 (BP 1 Location: Right arm, BP Patient Position: Sitting)   Pulse 93   Temp 98.8 °F (37.1 °C)   Resp 18   Ht 6' 1\" (1.854 m)   Wt 242 lb (109.8 kg)   SpO2 95%   BMI 31.93 kg/m²        Tmax:  Temp (24hrs), Av.4 °F (36.9 °C), Min:97.8 °F (36.6 °C), Max:98.8 °F (37.1 °C)      Exam:   Patient is intubated:  no    Physical Examination:   General:  Alert, cooperative, no distress   Head:  Normocephalic, atraumatic. Eyes:  Conjunctivae clear   Neck: Supple       Lungs:   No distress. Chest wall:     Heart:     Abdomen:   Non-distended   Extremities: Moves all. Skin:  LLE stable. Neurologic: CNII-XII intact. Normal strength     Labs:        No lab exists for component: ITNL   No results for input(s): CPK, CKMB, TROIQ in the last 72 hours.   Recent Labs     10/26/20  0633      K 4.1      CO2 26   BUN 7   CREA 0.74   GLU 96   WBC 10.0   HGB 9.6*   HCT 31.3*        No results for input(s): INR, PTP, APTT, INREXT, INREXT in the last 72 hours. Needs: urine analysis, urine sodium, protein and creatinine  No results found for: VIVEK, CREAU      Cultures:     Lab Results   Component Value Date/Time    Specimen Description: BLOOD 02/04/2010 10:55 PM    Specimen Description: BLOOD 02/12/2009 11:30 PM     Lab Results   Component Value Date/Time    Culture result: NO GROWTH 6 DAYS 10/21/2020 02:51 AM    Culture result: NO GROWTH 6 DAYS 10/21/2020 02:51 AM    Culture result: HEAVY DIPHTHEROIDS (A) 05/30/2019 03:00 PM    Culture result: (A) 05/30/2019 03:00 PM     LIGHT STENOTROPHOMONAS (Saralyn Beery.) MALTOPHILIA CLSI GUIDELINES DO NOT RECOMMEND REPORTING SUSCEPTIBILITIES FOR S. MALTOPHILIA. TRIMETHOPRIM/SULFAMETHOXAZOLE IS THE DRUG OF CHOICE.     Culture result: FEW PROTEUS MIRABILIS (A) 05/30/2019 03:00 PM    Culture result: FEW KLEBSIELLA PNEUMONIAE (A) 05/30/2019 03:00 PM       Radiology:     Medications       Current Facility-Administered Medications   Medication Dose Route Frequency Last Dose    oxyCODONE-acetaminophen (PERCOCET 10)  mg per tablet 1 Tab  1 Tab Oral Q4H PRN 1 Tab at 10/28/20 1513    senna-docusate (PERICOLACE) 8.6-50 mg per tablet 1 Tab  1 Tab Oral BID 1 Tab at 10/24/20 1732    HYDROmorphone (DILAUDID) tablet 4 mg  4 mg Oral Q3H PRN 4 mg at 10/28/20 1719    diphenhydrAMINE (BENADRYL) capsule 25 mg  25 mg Oral Q6H PRN 25 mg at 10/25/20 1143    allopurinoL (ZYLOPRIM) tablet 100 mg  100 mg Oral DAILY 100 mg at 10/24/20 0805    pantoprazole (PROTONIX) tablet 40 mg  40 mg Oral ACB 40 mg at 10/28/20 0645    tamsulosin (FLOMAX) capsule 0.4 mg  0.4 mg Oral DAILY 0.4 mg at 10/28/20 0912    methadone (DOLOPHINE) tablet 140 mg  140 mg Oral DAILY 140 mg at 10/28/20 0924    nicotine (NICODERM CQ) 21 mg/24 hr patch 1 Patch  1 Patch TransDERmal DAILY      lisinopriL (PRINIVIL, ZESTRIL) tablet 20 mg  20 mg Oral DAILY 20 mg at 10/28/20 0937    sodium chloride (NS) flush 5-40 mL  5-40 mL IntraVENous PRN      acetaminophen (TYLENOL) tablet 650 mg  650 mg Oral Q6H  mg at 10/23/20 0754    Or    acetaminophen (TYLENOL) suppository 650 mg  650 mg Rectal Q6H PRN      polyethylene glycol (MIRALAX) packet 17 g  17 g Oral DAILY PRN      ondansetron (ZOFRAN ODT) tablet 4 mg  4 mg Oral Q8H PRN 4 mg at 10/22/20 0256    Or    ondansetron (ZOFRAN) injection 4 mg  4 mg IntraVENous Q6H PRN      famotidine (PEPCID) tablet 20 mg  20 mg Oral BID 20 mg at 10/28/20 1708    enoxaparin (LOVENOX) injection 40 mg  40 mg SubCUTAneous DAILY 40 mg at 10/28/20 0916    gabapentin (NEURONTIN) capsule 100 mg  100 mg Oral  mg at 10/28/20 1513    colchicine tablet 0.6 mg  0.6 mg Oral DAILY PRN 0.6 mg at 10/25/20 1143    levothyroxine (SYNTHROID) tablet 112 mcg  112 mcg Oral  mcg at 10/27/20 0628    lidocaine (XYLOCAINE) 2 % jelly   Mucous Membrane PRN      aspirin chewable tablet 81 mg  81 mg Oral DAILY 81 mg at 10/28/20 0911    nystatin (MYCOSTATIN) 100,000 unit/gram powder   Topical BID             Case discussed with:      Preston Romo DO

## 2020-10-28 NOTE — PROGRESS NOTES
10/28/20 Olu Andrade  received notification from Utilization review that an inpatient denial from 10/21/2020 forward. The case was reviewed by our physician advisor and a peer to peer was completed with the insurance medical director. The denial was upheld even after the peer to peer. This means this hospitalization was not covered by insurance because it was determined not medically necessary    Met with patient along with patient Advocate to inform patient of the denial.  Patient requested for us to speak with his friend Celine Shultz (c) [phone] to discuss the denial.  Poncho Obrien was called per patients request because she handles all the insurance and medical concerns. Poncho Obrien was informed patient has denied hospital stay since 10/21/2020, which was the day of admission. Poncho Obrien voiced concern of patient not being stable for discharge and needed further medical treatment. I gave her the phone number of the insurance company to discuss. At this time the denial stands. Patient is medically stable for the next level of care. Per the physician notes, patient does not have any infections. He is on oral medications, no antibiotics, and oral pain medication. Patient does need additional physical therapy and wound care. This treatment can be provided by the next level of care. Advised that case management has sent several referrals out for SNF for rehab and wound care. At this time there has not been any facilities that are able to accept patient. Some facilities are still pending. Advised patient and Dallin that we will follow up with all the skilled nursing facilities tomorrow. If we continue to have denials, we will have to schedule patient for home health for skilled nursing and PT and discharge patient if medically stable.       Saintclair Foreman, RN, MSN, MHA  Manager of Case Management

## 2020-10-28 NOTE — PROGRESS NOTES
[facility]  [address] [facility], [address] [place] [address]  [phone]      Medical Progress Note      NAME: Adwoa Sun   :  1965  MRM:  [id number]    Date/Time of service: 10/28/2020  11:35 AM       Subjective:     Chief Complaint:  C/o pain. Somewhat improved with oral dilaudid        Objective:       Vitals:       Last 24hrs VS reviewed since prior progress note. Most recent are:    Visit Vitals  /79 (BP 1 Location: Right arm, BP Patient Position: Sitting)   Pulse 93   Temp 98.8 °F (37.1 °C)   Resp 18   Ht 6' 1\" (1.854 m)   Wt 109.8 kg (242 lb)   SpO2 95%   BMI 31.93 kg/m²     SpO2 Readings from Last 6 Encounters:   10/28/20 95%   10/15/19 99%   19 96%   19 95%   19 99%   05/10/19 92%            Intake/Output Summary (Last 24 hours) at 10/28/2020 1554  Last data filed at 10/27/2020 1955  Gross per 24 hour   Intake --   Output 500 ml   Net -500 ml        Exam:     Physical Exam:    Gen:  Disheveled, ill-appearing, obese, NAD  HEENT:  Pink conjunctivae, PERRL, hearing intact to voice, moist mucous membranes  Neck:  Supple, without masses, thyroid non-tender  Resp:  No accessory muscle use, clear breath sounds without wheezes rales or rhonchi  Card:  No murmurs, normal S1, S2 without thrills, 1+ edema  Abd:  Soft, non-tender, non-distended, normoactive bowel sounds are present  Musc:  No cyanosis or clubbing  Skin:  Large open wound from L ankle to mid leg with some necrosis.   Some area with good granulation tissue  Neuro:  Cranial nerves 3-12 are grossly intact, chronic L hemiparesis, follows commands appropriately  Psych:  poor insight, oriented to person, place and time, alert    Medications Reviewed: (see below)    Lab Data Reviewed: (see below)    ______________________________________________________________________    Medications:     Current Facility-Administered Medications   Medication Dose Route Frequency    oxyCODONE-acetaminophen (PERCOCET 10)  mg per tablet 1 Tab  1 Tab Oral Q4H PRN    senna-docusate (PERICOLACE) 8.6-50 mg per tablet 1 Tab  1 Tab Oral BID    HYDROmorphone (DILAUDID) tablet 4 mg  4 mg Oral Q3H PRN    diphenhydrAMINE (BENADRYL) capsule 25 mg  25 mg Oral Q6H PRN    allopurinoL (ZYLOPRIM) tablet 100 mg  100 mg Oral DAILY    pantoprazole (PROTONIX) tablet 40 mg  40 mg Oral ACB    tamsulosin (FLOMAX) capsule 0.4 mg  0.4 mg Oral DAILY    methadone (DOLOPHINE) tablet 140 mg  140 mg Oral DAILY    nicotine (NICODERM CQ) 21 mg/24 hr patch 1 Patch  1 Patch TransDERmal DAILY    lisinopriL (PRINIVIL, ZESTRIL) tablet 20 mg  20 mg Oral DAILY    sodium chloride (NS) flush 5-40 mL  5-40 mL IntraVENous PRN    acetaminophen (TYLENOL) tablet 650 mg  650 mg Oral Q6H PRN    Or    acetaminophen (TYLENOL) suppository 650 mg  650 mg Rectal Q6H PRN    polyethylene glycol (MIRALAX) packet 17 g  17 g Oral DAILY PRN    ondansetron (ZOFRAN ODT) tablet 4 mg  4 mg Oral Q8H PRN    Or    ondansetron (ZOFRAN) injection 4 mg  4 mg IntraVENous Q6H PRN    famotidine (PEPCID) tablet 20 mg  20 mg Oral BID    enoxaparin (LOVENOX) injection 40 mg  40 mg SubCUTAneous DAILY    gabapentin (NEURONTIN) capsule 100 mg  100 mg Oral TID    colchicine tablet 0.6 mg  0.6 mg Oral DAILY PRN    levothyroxine (SYNTHROID) tablet 112 mcg  112 mcg Oral ACB    lidocaine (XYLOCAINE) 2 % jelly   Mucous Membrane PRN    aspirin chewable tablet 81 mg  81 mg Oral DAILY    nystatin (MYCOSTATIN) 100,000 unit/gram powder   Topical BID          Lab Review:     Recent Labs     10/26/20  0633   WBC 10.0   HGB 9.6*   HCT 31.3*        Recent Labs     10/26/20  0633      K 4.1      CO2 26   GLU 96   BUN 7   CREA 0.74   CA 8.6     Lab Results   Component Value Date/Time    Glucose (POC) 150 (H) 03/19/2018 11:45 AM    Glucose (POC) 123 (H) 03/18/2018 08:55 PM    Glucose (POC) 96 03/16/2018 04:58 PM    Glucose (POC) 116 (H) 12/12/2012 12:42 PM    Glucose (POC) 99 12/11/2012 11:27 PM          Assessment / Plan:     52 yo hx of HTN, CVA w/ L hemiparesis, bladder CA, depression, chronic pain, chronic L foot/leg infection, presented w/ chronic L leg wound    1) Acute on chronic L foot/leg wound: Not infected. Has been to multiple facilities. Plastics surg at [facility] recommended amputation. Prior skin biopsy in April 2019 concerning for pyoderma gangrenosum. Not on abx. ID, vascular surg, and podiatry following. Cont would care per wound care team.  [facility] declined to [facility] patient. Patient now willing to consider amputation, but needs to speak with dtrs. If he still decides against it, he will need good wound care, then f/u with dermatology, plastics, and wound care clinic as an outpatient, however outcome is still poor. Best way to preserve independence and function is with amputation. SNFs declined pt, will need to plan DC home. 2) Acute on Chronic pain/depression/anxiety: cont methadone, neurontin, PO dilaudid prn    3) Hx of hemorrhagic CVA w/ L hemiparesis: cont ASA, PT/OT    4) HTN: cont lisinopril    5) Hx of bladder CA: f/u with Urologist in [place].   Cont flomax    6) Hypothyroid: cont synthroid                    Care Plan discussed with: Patient, nursing    Discussed:  Care Plan    Prophylaxis:  Lovenox    Disposition:  SNF pending           ___________________________________________________    Attending Physician: Giovanny Severino MD

## 2020-10-29 LAB
ANION GAP SERPL CALC-SCNC: 7 MMOL/L (ref 5–15)
BASOPHILS # BLD: 0 K/UL (ref 0–0.1)
BASOPHILS NFR BLD: 0 % (ref 0–1)
BUN SERPL-MCNC: 10 MG/DL (ref 6–20)
BUN/CREAT SERPL: 13 (ref 12–20)
CALCIUM SERPL-MCNC: 8.7 MG/DL (ref 8.5–10.1)
CHLORIDE SERPL-SCNC: 104 MMOL/L (ref 97–108)
CO2 SERPL-SCNC: 26 MMOL/L (ref 21–32)
CREAT SERPL-MCNC: 0.79 MG/DL (ref 0.7–1.3)
DIFFERENTIAL METHOD BLD: ABNORMAL
EOSINOPHIL # BLD: 0.8 K/UL (ref 0–0.4)
EOSINOPHIL NFR BLD: 8 % (ref 0–7)
ERYTHROCYTE [DISTWIDTH] IN BLOOD BY AUTOMATED COUNT: 15.9 % (ref 11.5–14.5)
GLUCOSE SERPL-MCNC: 118 MG/DL (ref 65–100)
HCT VFR BLD AUTO: 28.4 % (ref 36.6–50.3)
HGB BLD-MCNC: 8.7 G/DL (ref 12.1–17)
IMM GRANULOCYTES # BLD AUTO: 0.1 K/UL (ref 0–0.04)
IMM GRANULOCYTES NFR BLD AUTO: 1 % (ref 0–0.5)
LYMPHOCYTES # BLD: 2.2 K/UL (ref 0.8–3.5)
LYMPHOCYTES NFR BLD: 22 % (ref 12–49)
MCH RBC QN AUTO: 25.6 PG (ref 26–34)
MCHC RBC AUTO-ENTMCNC: 30.6 G/DL (ref 30–36.5)
MCV RBC AUTO: 83.5 FL (ref 80–99)
MONOCYTES # BLD: 1.1 K/UL (ref 0–1)
MONOCYTES NFR BLD: 11 % (ref 5–13)
NEUTS SEG # BLD: 5.9 K/UL (ref 1.8–8)
NEUTS SEG NFR BLD: 58 % (ref 32–75)
NRBC # BLD: 0 K/UL (ref 0–0.01)
NRBC BLD-RTO: 0 PER 100 WBC
PLATELET # BLD AUTO: 283 K/UL (ref 150–400)
PMV BLD AUTO: 10.4 FL (ref 8.9–12.9)
POTASSIUM SERPL-SCNC: 3.9 MMOL/L (ref 3.5–5.1)
RBC # BLD AUTO: 3.4 M/UL (ref 4.1–5.7)
SODIUM SERPL-SCNC: 137 MMOL/L (ref 136–145)
WBC # BLD AUTO: 10.1 K/UL (ref 4.1–11.1)

## 2020-10-29 PROCEDURE — 85025 COMPLETE CBC W/AUTO DIFF WBC: CPT

## 2020-10-29 PROCEDURE — 36415 COLL VENOUS BLD VENIPUNCTURE: CPT

## 2020-10-29 PROCEDURE — 74011250637 HC RX REV CODE- 250/637: Performed by: INTERNAL MEDICINE

## 2020-10-29 PROCEDURE — 80048 BASIC METABOLIC PNL TOTAL CA: CPT

## 2020-10-29 PROCEDURE — 77030006402 HC SHOE PSTOP RIG DJOR -A

## 2020-10-29 PROCEDURE — 97530 THERAPEUTIC ACTIVITIES: CPT

## 2020-10-29 PROCEDURE — 97162 PT EVAL MOD COMPLEX 30 MIN: CPT

## 2020-10-29 PROCEDURE — 77030018836 HC SOL IRR NACL ICUM -A

## 2020-10-29 PROCEDURE — 65270000029 HC RM PRIVATE

## 2020-10-29 PROCEDURE — 74011250636 HC RX REV CODE- 250/636: Performed by: INTERNAL MEDICINE

## 2020-10-29 RX ADMIN — HYDROMORPHONE HYDROCHLORIDE 4 MG: 2 TABLET ORAL at 03:22

## 2020-10-29 RX ADMIN — ASPIRIN 81 MG 81 MG: 81 TABLET ORAL at 08:17

## 2020-10-29 RX ADMIN — ALLOPURINOL 100 MG: 100 TABLET ORAL at 08:18

## 2020-10-29 RX ADMIN — METHADONE HYDROCHLORIDE 140 MG: 10 TABLET ORAL at 08:17

## 2020-10-29 RX ADMIN — TAMSULOSIN HYDROCHLORIDE 0.4 MG: 0.4 CAPSULE ORAL at 08:17

## 2020-10-29 RX ADMIN — OXYCODONE HYDROCHLORIDE AND ACETAMINOPHEN 1 TABLET: 10; 325 TABLET ORAL at 05:21

## 2020-10-29 RX ADMIN — HYDROMORPHONE HYDROCHLORIDE 4 MG: 2 TABLET ORAL at 21:33

## 2020-10-29 RX ADMIN — FAMOTIDINE 20 MG: 20 TABLET, FILM COATED ORAL at 18:30

## 2020-10-29 RX ADMIN — LEVOTHYROXINE SODIUM 112 MCG: 0.11 TABLET ORAL at 06:38

## 2020-10-29 RX ADMIN — FAMOTIDINE 20 MG: 20 TABLET, FILM COATED ORAL at 08:17

## 2020-10-29 RX ADMIN — NYSTATIN: 100000 POWDER TOPICAL at 08:18

## 2020-10-29 RX ADMIN — LISINOPRIL 20 MG: 20 TABLET ORAL at 08:17

## 2020-10-29 RX ADMIN — GABAPENTIN 100 MG: 100 CAPSULE ORAL at 21:34

## 2020-10-29 RX ADMIN — HYDROMORPHONE HYDROCHLORIDE 4 MG: 2 TABLET ORAL at 07:23

## 2020-10-29 RX ADMIN — GABAPENTIN 100 MG: 100 CAPSULE ORAL at 08:17

## 2020-10-29 RX ADMIN — GABAPENTIN 100 MG: 100 CAPSULE ORAL at 15:35

## 2020-10-29 RX ADMIN — ENOXAPARIN SODIUM 40 MG: 40 INJECTION SUBCUTANEOUS at 08:17

## 2020-10-29 RX ADMIN — OXYCODONE HYDROCHLORIDE AND ACETAMINOPHEN 1 TABLET: 10; 325 TABLET ORAL at 13:50

## 2020-10-29 RX ADMIN — OXYCODONE HYDROCHLORIDE AND ACETAMINOPHEN 1 TABLET: 10; 325 TABLET ORAL at 18:30

## 2020-10-29 RX ADMIN — HYDROMORPHONE HYDROCHLORIDE 4 MG: 2 TABLET ORAL at 15:35

## 2020-10-29 RX ADMIN — HYDROMORPHONE HYDROCHLORIDE 4 MG: 2 TABLET ORAL at 10:15

## 2020-10-29 RX ADMIN — PANTOPRAZOLE SODIUM 40 MG: 40 TABLET, DELAYED RELEASE ORAL at 06:38

## 2020-10-29 RX ADMIN — HYDROMORPHONE HYDROCHLORIDE 4 MG: 2 TABLET ORAL at 00:09

## 2020-10-29 NOTE — PROGRESS NOTES
Problem: Mobility Impaired (Adult and Pediatric)  Goal: *Acute Goals and Plan of Care (Insert Text)  Description: FUNCTIONAL STATUS PRIOR TO ADMISSION: 1 month ago pt was ambulatory using cane and performed functional mobility without assistance. He was admitted to outside hospital where he reports not mobilizing out of bed x 1 month and sustaining additional injury to LLE at that facility. HOME SUPPORT PRIOR TO ADMISSION: The patient lived with [place] to whom he refers as his \". \"    Physical Therapy Goals  Initiated 10/22/2020  1. Patient will move from supine to sit and sit to supine  in bed with supervision/set-up within 7 day(s). 2.  Patient will transfer from bed to chair and chair to bed with maximal assistance using the least restrictive device within 7 day(s). 3.  Patient will demonstrate independence with HEP for strengthening within 7 days. Outcome: Progressing Towards Goal  Note: Physical Therapy Goals  Revised 10/29/2020  1. Patient will move from supine to sit and sit to supine  in bed with modified independence within 7 day(s). 2.  Patient will transfer from bed to chair and chair to bed with moderate assistance  using the least restrictive device within 7 day(s). 3.  Patient will perform sit to stand with moderate assistance  within 7 day(s). PHYSICAL THERAPY TREATMENT: WEEKLY REASSESSMENT  Patient: Andrea Alatorre (85 y.o. male)  Date: 10/29/2020  Primary Diagnosis: Foot ulcer ([address] [W84.136]  Left foot infection [L08.9]        Precautions:   Fall, NWB(LLE)      ASSESSMENT  Patient continues with skilled PT services and is slowly making progressing towards goals. Admitted due to L foot ulcer, yet pt refusing recommended BKA at this time. He is received sitting on EOB with LE's in dependent position stating significant pain 10/10  LLE with dsg in place. He is hemiparetic on L side from previous CVA and aneurysm. Pt is  NWB on LLE.   Requested order for post op shoe from RN to assist keeping dsg clean and promote healing. Shoe fitted. Pt attempted use of noe-walker in RUE with sit to stand, yet unable and stating 10/10 pain with movement of LLE. Pt also unable to NWB. OT provided drop arm BSC to allow lateral transfer to R on RLE. Pt grateful, yet declining transfer at this time. Pt agreeable to return to supine with SBA and able to slide [facility] with RLE/UE. Refused elevation of LLE with pillow stating is too painful. Pt will knee SNF for rehab. He lives with girlfriend/caretaker at this time. Patient's progression toward goals since last assessment: per above    Current Level of Function Impacting Discharge (mobility/balance): SBA with bed/Max Ax2 with transfers/unable to stand/sitting good/standing poor    Functional Outcome Measure: The patient scored 35/100 on the barthel outcome measure which is indicative of 60-79% functional impairment. Other factors to consider for discharge: per above         PLAN :  Goals have been updated based on progression since last assessment. Patient continues to benefit from skilled intervention to address the above impairments. Recommendations and Planned Interventions: bed mobility training, transfer training, gait training, therapeutic exercises, neuromuscular re-education, edema management/control, patient and family training/education, and therapeutic activities      Frequency/Duration: Patient will be followed by physical therapy:  5 times a week to address goals. Recommendation for discharge: (in order for the patient to meet his/her long term goals)  Therapy up to 5 days/week in SNF setting    This discharge recommendation:  Has been made in collaboration with the attending provider and/or case management    IF patient discharges home will need the following DME: to be determined (TBD)         SUBJECTIVE:   Patient stated I am in so much pain.     OBJECTIVE DATA SUMMARY:   HISTORY:    Past Medical History:   Diagnosis Date    Aneurysm [address]     (with stroke)    Bladder tumor     Cancer ([facility]     bladder CA    Chronic pain     Family history of bladder cancer     GERD (gastroesophageal reflux disease)     Gout     History of vascular access device 04/25/2019    [facility] VAT 4 FR MIDLINE R Cephalic Limited access    History of vascular access device 04/26/2019    4 fr Midline right Cephalic midline access    Hypercholesterolemia     Hypertension     Lesion of bladder     Seizures ([facility]     Stroke ([facility] 2006    left sided weakness    Thromboembolus ([facility]     left leg    Thyroid disease     TIA (transient ischemic attack) 2012     Past Surgical History:   Procedure Laterality Date    HX ORTHOPAEDIC      R arthroscopy    HX OTHER SURGICAL      x2 L foot    HX UROLOGICAL  04/20/2018    Cystoscopy, TURBT (greater than 5 cm resected) Dr. Dav Leon, [facility].  KNEE ARTHROSCP HARV      left knee    TRANSURETHRAL RESEC BLADDER NECK  08/10/2018       Personal factors and/or comorbidities impacting plan of care: see above and below    Home Situation  Home Environment: Private residence  # Steps to Enter: 4  Rails to Enter: Yes  One/Two Story Residence: Two story, live on 1st floor  Living Alone: No  Support Systems: Friends \ neighbors  Patient Expects to be Discharged to[de-identified] Rehabilitation facility(SNF)  Current DME Used/Available at Home: Cane, straight    EXAMINATION/PRESENTATION/DECISION MAKING:   Critical Behavior:  Neurologic State: Alert, Eyes open spontaneously  Orientation Level: Oriented X4  Cognition: Follows commands     Hearing:   Auditory  Auditory Impairment: None  Skin:  IV; L foot dsg  Edema: LLE  Range Of Motion:  AROM: Generally decreased, functional(LUE with flexion contracture)non-functional                       Strength:    Strength: Generally decreased, functional                    Tone & Sensation:   Tone: Abnormal              Sensation: Impaired Coordination:  Coordination: Generally decreased, functional  Vision:      Functional Mobility:  Bed Mobility:  Rolling: Supervision  Supine to Sit: Supervision;Modified independent; Additional time  Sit to Supine: Modified independent;Supervision; Additional time  Scooting: Contact guard assistance; Additional time  Transfers:  Sit to Stand: Maximum assistance; Total assistance;Assist x2(unable at this time)                          Balance:   Sitting: Intact  Sitting - Static: Good (unsupported)  Sitting - Dynamic: Good (unsupported)  Standing: Impaired  Standing - Static: Poor  Standing - Dynamic : Poor  Ambulation/Gait Training:                                                        Functional Measure:  Barthel Index:    Bathin  Bladder: 5  Bowels: 10  Groomin  Dressin  Feedin  Mobility: 0  Stairs: 0  Toilet Use: 5  Transfer (Bed to Chair and Back): 5  Total: 35/100       The Barthel ADL Index: Guidelines  1. The index should be used as a record of what a patient does, not as a record of what a patient could do. 2. The main aim is to establish degree of independence from any help, physical or verbal, however minor and for whatever reason. 3. The need for supervision renders the patient not independent. 4. A patient's performance should be established using the best available evidence. Asking the patient, friends/relatives and nurses are the usual sources, but direct observation and common sense are also important. However direct testing is not needed. 5. Usually the patient's performance over the preceding 24-48 hours is important, but occasionally longer periods will be relevant. 6. Middle categories imply that the patient supplies over 50 per cent of the effort. 7. Use of aids to be independent is allowed. Yajaira Gardner., Barthel, D.W. (1058). Functional evaluation: the Barthel Index. [address] [facility] (14)2. [facility] balwinder Jay, J.J.M.F, Amina Trevino., Cecilia Ventura.Eileen, [address] [place] [address] Heavenly (). Measuring the change indisability after inpatient rehabilitation; comparison of the responsiveness of the Barthel Index and Functional Real Measure. Journal of Neurology, Neurosurgery, and Psychiatry, 66(4), 167-072. DORCAS Brian, ELEANOR Hines, & Janet Terrell M.A. (2004.) Assessment of post-stroke quality of life in cost-effectiveness studies: The usefulness of the Barthel Index and the EuroQoL-5D. Quality of Life Research, 13, 427-43          Pain Rating:  10/10 LLe    Activity Tolerance:   Poor  Please refer to the flowsheet for vital signs taken during this treatment. After treatment patient left in no apparent distress:   Supine in bed, Call bell within reach, and Side rails x 3    COMMUNICATION/EDUCATION:   The patients plan of care was discussed with: Occupational therapist and Registered nurse. Fall prevention education was provided and the patient/caregiver indicated understanding., Patient/family have participated as able in goal setting and plan of care. , and Patient/family agree to work toward stated goals and plan of care.     Thank you for this referral.  Albaro Brambila, PT   Time Calculation: 30 mins

## 2020-10-29 NOTE — PROGRESS NOTES
Bedside and Verbal shift change report given to Radha Barlwo RN (oncoming nurse) by Addie Pendleton RN (offgoing nurse). Report included the following information SBAR, Intake/Output, MAR and Recent Results.

## 2020-10-29 NOTE — PROGRESS NOTES
Patient has been refusing vital sign assessment all night. States he doesn't want the tech assigned to his room in there.

## 2020-10-29 NOTE — PROGRESS NOTES
Problem: Pressure Injury - Risk of  Goal: *Prevention of pressure injury  Description: Document Dejuan Scale and appropriate interventions in the flowsheet. Outcome: Progressing Towards Goal  Note: Pressure Injury Interventions:  Sensory Interventions: Keep linens dry and wrinkle-free, Minimize linen layers    Moisture Interventions: Absorbent underpads, Apply protective barrier, creams and emollients    Activity Interventions: Increase time out of bed, Pressure redistribution bed/mattress(bed type)    Mobility Interventions: Assess need for specialty bed, Pressure redistribution bed/mattress (bed type)    Nutrition Interventions: Document food/fluid/supplement intake    Friction and Shear Interventions: Apply protective barrier, creams and emollients                Problem: Falls - Risk of  Goal: *Absence of Falls  Description: Document Jordon Fall Risk and appropriate interventions in the flowsheet.   Outcome: Progressing Towards Goal  Note: Fall Risk Interventions:  Mobility Interventions: Patient to call before getting OOB    Mentation Interventions: Increase mobility, Gait belt with transfers/ambulation, Toileting rounds    Medication Interventions: Patient to call before getting OOB, Teach patient to arise slowly    Elimination Interventions: Call light in reach, Patient to call for help with toileting needs    History of Falls Interventions: Bed/chair exit alarm

## 2020-10-29 NOTE — PROGRESS NOTES
Bedside and Verbal shift change report given to Shilpi (oncoming nurse) by Zarina Sigala (offgoing nurse). Report included the following information SBAR, Intake/Output, MAR and Recent Results.

## 2020-10-29 NOTE — PROGRESS NOTES
12:50 PM  [facility] has declined the patient. JUVENCIO Gonzales     [facility] is not able to accept the patient. [facility] has accepted the patient if the plan is for the patient to return home. CM following the patient has been notified of the above.    JUVENCIO Gonzales

## 2020-10-29 NOTE — PROGRESS NOTES
[facility]  [address] [facility], [place], [address] [place] [address]  [phone]      Medical Progress Note      NAME: Monica Montejo   :  1965  MRM:  [id number]    Date/Time of service: 10/29/2020  11:35 AM       Subjective:     Chief Complaint:  C/o pain. Somewhat improved with oral dilaudid        Objective:       Vitals:       Last 24hrs VS reviewed since prior progress note. Most recent are:    Visit Vitals  /73 (BP 1 Location: Right arm, BP Patient Position: At rest)   Pulse 78   Temp 98.3 °F (36.8 °C)   Resp 17   Ht 6' 1\" (1.854 m)   Wt 109.8 kg (242 lb)   SpO2 94%   BMI 31.93 kg/m²     SpO2 Readings from Last 6 Encounters:   10/29/20 94%   10/15/19 99%   19 96%   19 95%   19 99%   05/10/19 92%          No intake or output data in the 24 hours ending 10/29/20 8483     Exam:     Physical Exam:    Gen:  Disheveled, ill-appearing, obese, NAD  HEENT:  Pink conjunctivae, PERRL, hearing intact to voice, moist mucous membranes  Neck:  Supple, without masses, thyroid non-tender  Resp:  No accessory muscle use, clear breath sounds without wheezes rales or rhonchi  Card:  No murmurs, normal S1, S2 without thrills, 1+ edema  Abd:  Soft, non-tender, non-distended, normoactive bowel sounds are present  Musc:  No cyanosis or clubbing  Skin:  Large open wound from L ankle to mid leg with some necrosis.   Some area with good granulation tissue  Neuro:  Cranial nerves 3-12 are grossly intact, chronic L hemiparesis, follows commands appropriately  Psych:  poor insight, oriented to person, place and time, alert    Medications Reviewed: (see below)    Lab Data Reviewed: (see below)    ______________________________________________________________________    Medications:     Current Facility-Administered Medications   Medication Dose Route Frequency    oxyCODONE-acetaminophen (PERCOCET 10)  mg per tablet 1 Tab  1 Tab Oral Q4H PRN    senna-docusate (PERICOLACE) 8.6-50 mg per tablet 1 Tab  1 Tab Oral BID    HYDROmorphone (DILAUDID) tablet 4 mg  4 mg Oral Q3H PRN    diphenhydrAMINE (BENADRYL) capsule 25 mg  25 mg Oral Q6H PRN    allopurinoL (ZYLOPRIM) tablet 100 mg  100 mg Oral DAILY    pantoprazole (PROTONIX) tablet 40 mg  40 mg Oral ACB    tamsulosin (FLOMAX) capsule 0.4 mg  0.4 mg Oral DAILY    methadone (DOLOPHINE) tablet 140 mg  140 mg Oral DAILY    nicotine (NICODERM CQ) 21 mg/24 hr patch 1 Patch  1 Patch TransDERmal DAILY    lisinopriL (PRINIVIL, ZESTRIL) tablet 20 mg  20 mg Oral DAILY    sodium chloride (NS) flush 5-40 mL  5-40 mL IntraVENous PRN    acetaminophen (TYLENOL) tablet 650 mg  650 mg Oral Q6H PRN    Or    acetaminophen (TYLENOL) suppository 650 mg  650 mg Rectal Q6H PRN    polyethylene glycol (MIRALAX) packet 17 g  17 g Oral DAILY PRN    ondansetron (ZOFRAN ODT) tablet 4 mg  4 mg Oral Q8H PRN    Or    ondansetron (ZOFRAN) injection 4 mg  4 mg IntraVENous Q6H PRN    famotidine (PEPCID) tablet 20 mg  20 mg Oral BID    enoxaparin (LOVENOX) injection 40 mg  40 mg SubCUTAneous DAILY    gabapentin (NEURONTIN) capsule 100 mg  100 mg Oral TID    colchicine tablet 0.6 mg  0.6 mg Oral DAILY PRN    levothyroxine (SYNTHROID) tablet 112 mcg  112 mcg Oral ACB    lidocaine (XYLOCAINE) 2 % jelly   Mucous Membrane PRN    aspirin chewable tablet 81 mg  81 mg Oral DAILY    nystatin (MYCOSTATIN) 100,000 unit/gram powder   Topical BID          Lab Review:     Recent Labs     10/29/20  0443   WBC 10.1   HGB 8.7*   HCT 28.4*        Recent Labs     10/29/20  0443      K 3.9      CO2 26   *   BUN 10   CREA 0.79   CA 8.7     Lab Results   Component Value Date/Time    Glucose (POC) 150 (H) 03/19/2018 11:45 AM    Glucose (POC) 123 (H) 03/18/2018 08:55 PM    Glucose (POC) 96 03/16/2018 04:58 PM    Glucose (POC) 116 (H) 12/12/2012 12:42 PM    Glucose (POC) 99 12/11/2012 11:27 PM          Assessment / Plan:     54 yo hx of HTN, CVA w/ L hemiparesis, bladder CA, depression, chronic pain, chronic L foot/leg infection, presented w/ chronic L leg wound    1) Acute on chronic L foot/leg wound: Not infected. Has been to multiple facilities. Plastics surg at [facility] recommended amputation. Prior skin biopsy in April 2019 concerning for pyoderma gangrenosum. ID, vascular surg, and podiatry were following. ID did not feel there was an active infection and patient is off antibiotics. Vascular surgery does not think that his foot is salvageable and recommended amputation however patient declined. Cont would care per wound care team.  [facility]. Will need f/u with dermatology, plastics, and wound care clinic as an outpatient, however outcome is still poor. He cannot ambulate independently. Insurance is not going to cover this hospital stay and multiple SNFs declined pt, will need to plan DC home with HH, PT, possibly DME, discussed with case management. He will also need referral to pain management and will need to est with a new pcp on discharge. 2) Acute on Chronic pain/depression/anxiety: cont methadone, neurontin, PO dilaudid and oxycodone prn    3) Hx of hemorrhagic CVA w/ L hemiparesis: cont ASA, PT/OT    4) HTN: cont lisinopril    5) Hx of bladder CA: f/u with Urologist in [place].   Cont flomax    6) Hypothyroid: cont synthroid                    Care Plan discussed with: Patient, nursing    Discussed:  Care Plan    Prophylaxis:  Lovenox    Disposition:  SNF pending           ___________________________________________________    Attending Physician: Armando Williamson MD

## 2020-10-29 NOTE — PROGRESS NOTES
Problem: Self Care Deficits Care Plan (Adult)  Goal: *Acute Goals and Plan of Care (Insert Text)  Description: FUNCTIONAL STATUS PRIOR TO ADMISSION: 1 month ago pt was ambulatory using cane and performed functional mobility without assistance. He was admitted to outside hospital where he reports not mobilizing out of bed x 1 month and sustaining additional injury to LLE at that facility. HOME SUPPORT PRIOR TO ADMISSION: The patient lived with Shauna Joel to whom he refers as his \". \"    Occupational Therapy Goals  Weekly re assessment 11/29/2020- continue all goals   Initiated 10/22/2020  1. Patient will perform upper body dressing, using noe techniques, with supervision/set-up within 7 day(s). 2.  Patient will perform lower body dressing with minimal assistance/contact guard assist within 7 day(s). 3.  Patient will perform toilet transfers, to [facility],  with maximal assistance within 7 day(s). 4.  Patient will perform all aspects of toileting with maximal assistance within 7 day(s). Outcome: Progressing Towards Goal   OCCUPATIONAL THERAPY TREATMENT  Patient: Lucía Olson (85 y.o. male)  Date: 10/29/2020  Diagnosis: Foot ulcer ([facility] [address] [F24.030]  Left foot infection [L08.9]   <principal problem not specified>       Precautions: Fall, NWB(LLE)  Chart, occupational therapy assessment, plan of care, and goals were reviewed. ASSESSMENT  Patient continues with skilled OT services and is progressing towards goals. Patient with slow progression secondary to increased pain to LLE, and residual weakness to L side from prior CVA. Patient received seated EOB, bilateral feet to floor with dycem to floor for traction. Reports using lift team to transfer to [facility]. Noted BSC without drop arm ability and increased difficulty per patient report. OT secured drop arm BSC for ease of transfers, but unable to find bariatric to allow sliding.   Patient initially agreeable but with attempt patient reports severe pain to L foot and declines all further activity. Current Level of Function Impacting Discharge (ADLs): up to total assist.  Unable to achieve position for ability to perform tasks optimally. Other factors to consider for discharge:          PLAN :  Patient continues to benefit from skilled intervention to address the above impairments. Continue treatment per established plan of care. to address goals. Recommend with staff: ADLs as able    Recommend next OT session: progression of OT goals     Recommendation for discharge: (in order for the patient to meet his/her long term goals)  Therapy up to 5 days/week in SNF setting    This discharge recommendation:  Has been made in collaboration with the attending provider and/or case management    IF patient discharges home will need the following DME: TBD       SUBJECTIVE:   Patient stated I cant do it.     OBJECTIVE DATA SUMMARY:   Cognitive/Behavioral Status:  Neurologic State: Alert;Eyes open spontaneously  Orientation Level: Oriented X4  Cognition: Follows commands             Functional Mobility and Transfers for ADLs:  Bed Mobility:  Rolling: Supervision  Supine to Sit: Supervision;Modified independent; Additional time  Sit to Supine: Modified independent;Supervision; Additional time  Scooting: Contact guard assistance; Additional time    Transfers:  Sit to Stand: Maximum assistance; Total assistance;Assist x2(unable at this time)          Balance:  Sitting: Intact  Sitting - Static: Good (unsupported)  Sitting - Dynamic: Good (unsupported)  Standing: Impaired  Standing - Static: Poor  Standing - Dynamic : Poor    ADL Intervention:                           Lower Body Dressing Assistance  Shoes with Velcro:  Moderate assistance  Leg Crossed Method Used: No  Position Performed: Seated edge of bed              Therapeutic Exercises:       Pain:  Patient reports increased pain with LLE toward floor when seated EOB    Activity Tolerance:   Fair  Please refer to the flowsheet for vital signs taken during this treatment. After treatment patient left in no apparent distress:   Supine in bed and Call bell within reach    COMMUNICATION/COLLABORATION:   The patients plan of care was discussed with: Physical therapist and Registered nurse.      Halina Griffith, OTR/L  Time Calculation: 24 mins

## 2020-10-29 NOTE — WOUND CARE
Wound follow up    Wound Left lower leg and dorsal foot wounds- present on admission.  Full thickness wounds- areas of red granulation-  80% thick yellow adherent slough throughout wound area- ludwig wound reddened with areas of dry skin- tender and painful to touch - tan drainage - no odor  Foot erythemic- edematous   Left posterior knee red with like red yeast like rash  Patient continues to have pain with dressing changes and instructs nurse to apply lidocaine when changing dressings.      Dressing change:  Wounds irrigated with saline- lidocaine and emulsion dressing applied covered with ABD dressings and taped in place.

## 2020-10-30 PROBLEM — L88 PYODERMA GANGRENOSUM: Status: ACTIVE | Noted: 2020-10-30

## 2020-10-30 LAB
ANION GAP SERPL CALC-SCNC: 5 MMOL/L (ref 5–15)
BASOPHILS # BLD: 0 K/UL (ref 0–0.1)
BASOPHILS NFR BLD: 1 % (ref 0–1)
BUN SERPL-MCNC: 12 MG/DL (ref 6–20)
BUN/CREAT SERPL: 14 (ref 12–20)
CALCIUM SERPL-MCNC: 8.7 MG/DL (ref 8.5–10.1)
CHLORIDE SERPL-SCNC: 103 MMOL/L (ref 97–108)
CO2 SERPL-SCNC: 28 MMOL/L (ref 21–32)
CREAT SERPL-MCNC: 0.86 MG/DL (ref 0.7–1.3)
DIFFERENTIAL METHOD BLD: ABNORMAL
EOSINOPHIL # BLD: 0.8 K/UL (ref 0–0.4)
EOSINOPHIL NFR BLD: 9 % (ref 0–7)
ERYTHROCYTE [DISTWIDTH] IN BLOOD BY AUTOMATED COUNT: 15.9 % (ref 11.5–14.5)
GLUCOSE SERPL-MCNC: 90 MG/DL (ref 65–100)
HCT VFR BLD AUTO: 30.3 % (ref 36.6–50.3)
HGB BLD-MCNC: 9.2 G/DL (ref 12.1–17)
IMM GRANULOCYTES # BLD AUTO: 0 K/UL (ref 0–0.04)
IMM GRANULOCYTES NFR BLD AUTO: 0 % (ref 0–0.5)
LYMPHOCYTES # BLD: 2.2 K/UL (ref 0.8–3.5)
LYMPHOCYTES NFR BLD: 26 % (ref 12–49)
MCH RBC QN AUTO: 25.6 PG (ref 26–34)
MCHC RBC AUTO-ENTMCNC: 30.4 G/DL (ref 30–36.5)
MCV RBC AUTO: 84.2 FL (ref 80–99)
MONOCYTES # BLD: 0.9 K/UL (ref 0–1)
MONOCYTES NFR BLD: 11 % (ref 5–13)
NEUTS SEG # BLD: 4.6 K/UL (ref 1.8–8)
NEUTS SEG NFR BLD: 53 % (ref 32–75)
NRBC # BLD: 0 K/UL (ref 0–0.01)
NRBC BLD-RTO: 0 PER 100 WBC
PLATELET # BLD AUTO: 267 K/UL (ref 150–400)
PMV BLD AUTO: 10 FL (ref 8.9–12.9)
POTASSIUM SERPL-SCNC: 4.3 MMOL/L (ref 3.5–5.1)
RBC # BLD AUTO: 3.6 M/UL (ref 4.1–5.7)
SODIUM SERPL-SCNC: 136 MMOL/L (ref 136–145)
WBC # BLD AUTO: 8.6 K/UL (ref 4.1–11.1)

## 2020-10-30 PROCEDURE — 74011250637 HC RX REV CODE- 250/637: Performed by: INTERNAL MEDICINE

## 2020-10-30 PROCEDURE — 85025 COMPLETE CBC W/AUTO DIFF WBC: CPT

## 2020-10-30 PROCEDURE — 65270000029 HC RM PRIVATE

## 2020-10-30 PROCEDURE — 77030018836 HC SOL IRR NACL ICUM -A

## 2020-10-30 PROCEDURE — 36415 COLL VENOUS BLD VENIPUNCTURE: CPT

## 2020-10-30 PROCEDURE — 2709999900 HC NON-CHARGEABLE SUPPLY

## 2020-10-30 PROCEDURE — 80048 BASIC METABOLIC PNL TOTAL CA: CPT

## 2020-10-30 RX ORDER — HYDROMORPHONE HYDROCHLORIDE 4 MG/1
4 TABLET ORAL
Qty: 40 TAB | Refills: 0 | Status: SHIPPED | OUTPATIENT
Start: 2020-10-30 | End: 2020-11-13

## 2020-10-30 RX ORDER — LIDOCAINE HYDROCHLORIDE 20 MG/ML
JELLY TOPICAL
Qty: 1 TUBE | Refills: 0 | Status: SHIPPED | OUTPATIENT
Start: 2020-10-30 | End: 2020-12-22

## 2020-10-30 RX ADMIN — ACETAMINOPHEN 650 MG: 325 TABLET ORAL at 21:39

## 2020-10-30 RX ADMIN — FAMOTIDINE 20 MG: 20 TABLET, FILM COATED ORAL at 17:21

## 2020-10-30 RX ADMIN — METHADONE HYDROCHLORIDE 140 MG: 10 TABLET ORAL at 08:48

## 2020-10-30 RX ADMIN — HYDROMORPHONE HYDROCHLORIDE 4 MG: 2 TABLET ORAL at 06:00

## 2020-10-30 RX ADMIN — HYDROMORPHONE HYDROCHLORIDE 4 MG: 2 TABLET ORAL at 18:22

## 2020-10-30 RX ADMIN — ASPIRIN 81 MG 81 MG: 81 TABLET ORAL at 08:48

## 2020-10-30 RX ADMIN — GABAPENTIN 100 MG: 100 CAPSULE ORAL at 16:19

## 2020-10-30 RX ADMIN — HYDROMORPHONE HYDROCHLORIDE 4 MG: 2 TABLET ORAL at 00:18

## 2020-10-30 RX ADMIN — LISINOPRIL 20 MG: 20 TABLET ORAL at 08:48

## 2020-10-30 RX ADMIN — LEVOTHYROXINE SODIUM 112 MCG: 0.11 TABLET ORAL at 08:48

## 2020-10-30 RX ADMIN — OXYCODONE HYDROCHLORIDE AND ACETAMINOPHEN 1 TABLET: 10; 325 TABLET ORAL at 01:43

## 2020-10-30 RX ADMIN — HYDROMORPHONE HYDROCHLORIDE 4 MG: 2 TABLET ORAL at 10:10

## 2020-10-30 RX ADMIN — NYSTATIN: 100000 POWDER TOPICAL at 08:52

## 2020-10-30 RX ADMIN — HYDROMORPHONE HYDROCHLORIDE 4 MG: 2 TABLET ORAL at 13:21

## 2020-10-30 RX ADMIN — FAMOTIDINE 20 MG: 20 TABLET, FILM COATED ORAL at 08:48

## 2020-10-30 RX ADMIN — HYDROMORPHONE HYDROCHLORIDE 4 MG: 2 TABLET ORAL at 16:19

## 2020-10-30 RX ADMIN — PANTOPRAZOLE SODIUM 40 MG: 40 TABLET, DELAYED RELEASE ORAL at 08:47

## 2020-10-30 RX ADMIN — GABAPENTIN 100 MG: 100 CAPSULE ORAL at 08:48

## 2020-10-30 RX ADMIN — TAMSULOSIN HYDROCHLORIDE 0.4 MG: 0.4 CAPSULE ORAL at 08:48

## 2020-10-30 NOTE — PROGRESS NOTES
10/30/2020   CARE MANAGEMENT NOTE:  CM reviewed EMR for clinical updates.  Pt was admitted with left foot/leg infection.  Also with hemorrhagic CVA. Reportedly, pt resides with roommates with plans to move to undisclosed location.     RUR 17%; LOS 9 days     Transition Plan of Care:  1. [facility] (skilled nursing for wound care, PT, OT) has been arranged. Discharge address:  [facility]; [facility]  2.  Rolling walker provided to pt from [facility]   3. CM provided a list of PCPs, and area pain specialists  4. [address] Medicaid ([phone]) w/c Arik Sotomayor will transport pt at 6 p.m. Ref# [id number]    No further needs identified at this writing.   Neelam

## 2020-10-30 NOTE — WOUND CARE
Wound care follow up to change dressing to left lower leg wound. Medication given for pain prior to care given  Wound dressing saturated with saline to remove adherent dressings  Full thickness wounds- layer of thick yellow adherent slough - irregular edges, moderate amount yellow drainage, ludwig wound skin moist. Patient requests to use only oil emulsion on wounds for care. Lidocaine applied to wounds prior to dressing replacement  Oil emulsion, dry abds and kerlix replaced. Supplies in room gathered for home use.   HH to follow  Wound care orders written for dc and  [facility] care    [address] [place] [address]

## 2020-10-30 NOTE — PROGRESS NOTES
[facility]  [address] [facility], [address] [place] [address]  [phone]      Medical Progress Note      NAME: Lord Galeazzi   :  1965  MRM:  [id number]    Date of service: 10/30/2020  10:36 AM       Assessment and Plan:   1. Acute on chronic L foot/leg infection: Not infected. Has been to multiple facilities. Plastics surg at [facility] recommended amputation. Prior skin biopsy in 2019 concerning for pyoderma gangrenosum. Will hold on IV abx. ID, vascular surg, and podiatry following. Cont would care per wound care team.  [facility] declined to take patient. At this point, patient will need good wound care at home, then f/u with dermatology, plastics, and wound care clinic as an outpatient. SNF has decline pt. Pt refused amputation.      2. Acute on Chronic pain/depression/anxiety: cont methadone, neurontin and dilaudid PO.       3. Hx of hemorrhagic CVA w/ L hemiparesis: cont ASA, PT/OT     4. HTN: cont lisinopril     5. Hx of bladder CA: f/u with Urologist in [place]. Cont flomax     6. Hypothyroid: cont synthroid           Subjective:     Chief Complaint[de-identified] Patient was seen and examined as a follow up for leg infection. Chart was reviewed. c/o severe LT leg pain     ROS:  (bold if positive, if negative)    Tolerating PT  Tolerating Diet        Objective:     Last 24hrs VS reviewed since prior progress note.  Most recent are:    Visit Vitals  /60 (BP 1 Location: Right arm, BP Patient Position: At rest)   Pulse 62   Temp 98.1 °F (36.7 °C)   Resp 16   Ht 6' 1\" (1.854 m)   Wt 109.8 kg (242 lb)   SpO2 93%   BMI 31.93 kg/m²     SpO2 Readings from Last 6 Encounters:   10/30/20 93%   10/15/19 99%   19 96%   19 95%   19 99%   05/10/19 92%            Intake/Output Summary (Last 24 hours) at 10/30/2020 0951  Last data filed at 10/30/2020 0100  Gross per 24 hour   Intake 360 ml   Output --   Net 360 ml        Physical Exam:    Gen:  Well-developed, well-nourished, in no acute distress  HEENT:  Pink conjunctivae, PERRL, hearing intact to voice, moist mucous membranes  Neck:  Supple, without masses, thyroid non-tender  Resp:  No accessory muscle use, clear breath sounds without wheezes rales or rhonchi  Card:  No murmurs, normal S1, S2 without thrills, bruits or peripheral edema  Abd:  Soft, non-tender, non-distended, normoactive bowel sounds are present, no palpable organomegaly and no detectable hernias  Lymph:  No cervical or inguinal adenopathy  Musc:  No cyanosis or clubbing  Skin:  Large wound on the LT lower leg  Neuro:  Cranial nerves are grossly intact, no focal motor weakness, follows commands appropriately  Psych:  Good insight, oriented to person, place and time, alert  __________________________________________________________________  Medications Reviewed: (see below)  Medications:     Current Facility-Administered Medications   Medication Dose Route Frequency    oxyCODONE-acetaminophen (PERCOCET 10)  mg per tablet 1 Tab  1 Tab Oral Q4H PRN    senna-docusate (PERICOLACE) 8.6-50 mg per tablet 1 Tab  1 Tab Oral BID    HYDROmorphone (DILAUDID) tablet 4 mg  4 mg Oral Q3H PRN    diphenhydrAMINE (BENADRYL) capsule 25 mg  25 mg Oral Q6H PRN    allopurinoL (ZYLOPRIM) tablet 100 mg  100 mg Oral DAILY    pantoprazole (PROTONIX) tablet 40 mg  40 mg Oral ACB    tamsulosin (FLOMAX) capsule 0.4 mg  0.4 mg Oral DAILY    methadone (DOLOPHINE) tablet 140 mg  140 mg Oral DAILY    nicotine (NICODERM CQ) 21 mg/24 hr patch 1 Patch  1 Patch TransDERmal DAILY    lisinopriL (PRINIVIL, ZESTRIL) tablet 20 mg  20 mg Oral DAILY    sodium chloride (NS) flush 5-40 mL  5-40 mL IntraVENous PRN    acetaminophen (TYLENOL) tablet 650 mg  650 mg Oral Q6H PRN    Or    acetaminophen (TYLENOL) suppository 650 mg  650 mg Rectal Q6H PRN    polyethylene glycol (MIRALAX) packet 17 g  17 g Oral DAILY PRN    ondansetron (ZOFRAN ODT) tablet 4 mg  4 mg Oral Q8H PRN    Or  ondansetron (ZOFRAN) injection 4 mg  4 mg IntraVENous Q6H PRN    famotidine (PEPCID) tablet 20 mg  20 mg Oral BID    enoxaparin (LOVENOX) injection 40 mg  40 mg SubCUTAneous DAILY    gabapentin (NEURONTIN) capsule 100 mg  100 mg Oral TID    colchicine tablet 0.6 mg  0.6 mg Oral DAILY PRN    levothyroxine (SYNTHROID) tablet 112 mcg  112 mcg Oral ACB    lidocaine (XYLOCAINE) 2 % jelly   Mucous Membrane PRN    aspirin chewable tablet 81 mg  81 mg Oral DAILY    nystatin (MYCOSTATIN) 100,000 unit/gram powder   Topical BID        Lab Data Reviewed: (see below)  Lab Review:     Recent Labs     10/30/20  0542 10/29/20  0443   WBC 8.6 10.1   HGB 9.2* 8.7*   HCT 30.3* 28.4*    283     Recent Labs     10/30/20  0542 10/29/20  0443    137   K 4.3 3.9    104   CO2 28 26   GLU 90 118*   BUN 12 10   CREA 0.86 0.79   CA 8.7 8.7     Lab Results   Component Value Date/Time    Glucose (POC) 150 (H) 03/19/2018 11:45 AM    Glucose (POC) 123 (H) 03/18/2018 08:55 PM    Glucose (POC) 96 03/16/2018 04:58 PM    Glucose (POC) 116 (H) 12/12/2012 12:42 PM    Glucose (POC) 99 12/11/2012 11:27 PM     No results for input(s): PH, PCO2, PO2, HCO3, FIO2 in the last 72 hours. No results for input(s): INR, INREXT, INREXT in the last 72 hours. All Micro Results     Procedure Component Value Units Date/Time    CULTURE, BLOOD, PERIPHERAL [[id number]] Collected:  10/21/20 0251    Order Status:  Completed Specimen:  Blood Updated:  10/27/20 0722     Special Requests: NO SPECIAL REQUESTS        Culture result: NO GROWTH 6 DAYS       CULTURE, BLOOD, PERIPHERAL [[id number]] Collected:  10/21/20 0251    Order Status:  Completed Specimen:  Blood Updated:  10/27/20 0722     Special Requests: NO SPECIAL REQUESTS        Culture result: NO GROWTH 6 DAYS       CULTURE, Ashvin Lob [[id number]] Collected:  10/21/20 0730    Order Status:  Canceled Specimen:  Leg           I have reviewed notes of prior 24hr.     Other pertinent lab: Total time spent with patient: Gosia 58 discussed with: Patient, Nursing Staff and >50% of time spent in counseling and coordination of care    Discussed:  Care Plan    Prophylaxis:  Lovenox    Disposition:  Home w/Family           ___________________________________________________    Attending Physician: Ervin Chan MD

## 2020-10-30 NOTE — DISCHARGE SUMMARY
Hospitalist Discharge Summary     Patient ID:    Kings Villatoro  [id number]  14 y.o.  1965    Admit date of service: 10/21/2020    Discharge date of service: 10/30/2020    Admission Diagnoses:  Foot ulcer ([facility] [P37.337]  Left foot infection [L08.9]    Chronic Diagnoses:    Problem List as of 10/30/2020 Date Reviewed: 9/9/2020          Codes Class Noted - Resolved    Pyoderma gangrenosum ICD-10-CM: L88  ICD-9-CM: 686.01  10/30/2020 - Present        Open wound, lower leg ICD-10-CM: H09.896B  ICD-9-CM: 891.0  5/30/2019 - Present        Depression ICD-10-CM: F32.9  ICD-9-CM: 247  5/20/2019 - Present        Foot infection ICD-10-CM: L08.9  ICD-9-CM: 686.9  4/23/2019 - Present        Nonadherence to medical treatment (Chronic) ICD-10-CM: Z91.19  ICD-9-CM: V15.81  10/12/2018 - Present        Sinus bradycardia ICD-10-CM: R00.1  ICD-9-CM: 427.89  10/12/2018 - Present        Gout (Chronic) ICD-10-CM: M10.9  ICD-9-CM: 274.9  10/12/2018 - Present        Hypothyroidism (Chronic) ICD-10-CM: E03.9  ICD-9-CM: 244.9  10/12/2018 - Present        Foot ulcer ([facility] ICD-10-CM: L97.509  ICD-9-CM: 707.15  9/28/2018 - Present        Chronic obstructive pulmonary disease ([facility] ICD-10-CM: J44.9  ICD-9-CM: 933  8/8/2018 - Present        Chronic pain disorder ICD-10-CM: G89.4  ICD-9-CM: 338.4  8/8/2018 - Present        Hypokalemia ICD-10-CM: E87.6  ICD-9-CM: 276.8  8/8/2018 - Present        Seizure disorder ([facility] ICD-10-CM: G40.909  ICD-9-CM: 345.90  8/8/2018 - Present        Tobacco abuse ICD-10-CM: Z72.0  ICD-9-CM: 305.1  8/8/2018 - Present        Major depression ICD-10-CM: F32.9  ICD-9-CM: 296.20  6/6/2018 - Present        Uncomplicated opioid dependence ([facility] ICD-10-CM: F11.20  ICD-9-CM: 304.00  6/6/2018 - Present        Left foot infection ICD-10-CM: L08.9  ICD-9-CM: 686.9  3/13/2018 - Present        Malignant neoplasm of urinary bladder ([facility] ICD-10-CM: C67.9  ICD-9-CM: 188.9  3/6/2018 - Present        FH: bladder cancer ICD-10-CM: Z80.52  ICD-9-CM: V16.52  3/6/2018 - Present        Long term current use of methadone for pain control ICD-10-CM: Z79.891  ICD-9-CM: V58.69  12/27/2017 - Present        Major depressive disorder with current active episode ICD-10-CM: F32.9  ICD-9-CM: 296.30  12/27/2017 - Present        Physical debility ICD-10-CM: R53.81  ICD-9-CM: 799.3  12/27/2017 - Present        HTN (hypertension) ICD-10-CM: I10  ICD-9-CM: 401.9  12/21/2017 - Present        Cellulitis of left lower extremity ICD-10-CM: L03.116  ICD-9-CM: 682.6  12/21/2017 - Present        Drug overdose ICD-10-CM: T50.901A  ICD-9-CM: 977.9, E980.5  10/9/2016 - Present        Thalamic pain syndrome ICD-10-CM: G89.0  ICD-9-CM: 338.0  5/1/2014 - Present        Brain aneurysm ICD-10-CM: I67.1  ICD-9-CM: 437.3  12/30/2013 - Present        Chronic pain ICD-10-CM: G89.29  ICD-9-CM: 338.29  12/30/2013 - Present    Overview Signed 3/6/2018 12:03 PM by Mateo, [address] [place] [address]:   [facility]  Anomalous Vertebra? No    Allergies that may influence a procedure:   no    Medications that may influence a procedure:   No    PMSH that may influence a procedure:   no    Diagnostic Tests:  EMG/NCS: not done   MRI: Images independently viewed, agree with report   X-ray: Images independently viewed, agree with report     Prior treatments:  Physical Therapy: Yes  Medications tried: opioids  Outside Records: All outside records that may have been scanned into [facility] have been reviewed and discussed with the patient.   Procedures: none    This patient seen in consultation referred by Dr. Hong Ojeda For neck pain   RECOMMENDATIONS:  - Oxycodone   - Oxycontin  - Discharge from practice    - High risk of cervical interventions discussed (spinal cord injury, paralysis, seizure, stroke, death)    If fails to progress,  - spine surgery consultation for open surgical procedure  - pain medicine consultation if non-operative care chosen    Key diagnostic test images:  reviewed             Neurologic gait dysfunction ICD-10-CM: R26.9  ICD-9-CM: 781.2  12/30/2013 - Present        Spasticity ICD-10-CM: R25.2  ICD-9-CM: 781.0  12/30/2013 - Present        Cerebral infarction [facility]) ICD-10-CM: I63.9  ICD-9-CM: 434.91  12/12/2012 - Present        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  ICD-9-CM: 338.0  12/12/2012 - Present        Cerebral hemorrhage with hemiparesis ([facility] ICD-10-CM: I61.9, G81.90  ICD-9-CM: 431, 342.90  4/15/2011 - Present        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  ICD-9-CM: 338.0  4/15/2011 - Present        Stroke ([facility] ICD-10-CM: I63.9  ICD-9-CM: 434.91  3/11/2011 - Present        Hypertension ICD-10-CM: I10  ICD-9-CM: 401.9  3/11/2011 - Present        Thromboembolism ([facility] ICD-10-CM: I74.9  ICD-9-CM: 444.9  3/11/2011 - Present        RESOLVED: Depressive disorder ICD-10-CM: F32.9  ICD-9-CM: 132  8/1/2018 - 8/2/2018              Discharge Medications:   Current Discharge Medication List      START taking these medications    Details   HYDROmorphone (DILAUDID) 4 mg tablet Take 1 Tab by mouth every three (3) hours as needed for Pain for up to 14 days. Max Daily Amount: 32 mg. Qty: 40 Tab, Refills: 0    Associated Diagnoses: Left foot infection      lidocaine (XYLOCAINE) 2 % jelly Apply to affected areas  Indications: wound care  Qty: 1 Tube, Refills: 0         CONTINUE these medications which have NOT CHANGED    Details   gabapentin (NEURONTIN) 100 mg capsule Take 100 mg by mouth three (3) times daily. levothyroxine (SYNTHROID) 112 mcg tablet Take 112 mcg by mouth Daily (before breakfast). lisinopriL (PRINIVIL, ZESTRIL) 20 mg tablet Take 20 mg by mouth daily. naproxen sodium (Aleve) 220 mg tablet Take 220 mg by mouth three (3) times daily as needed. ibuprofen (MOTRIN) 400 mg tablet Take 800 mg by mouth every six (6) hours as needed.       tamsulosin (FLOMAX) 0.4 mg capsule TAKE ONE CAPSULE BY MOUTH ONE TIME DAILY (after dinner)  Qty: 30 Cap, Refills: 1      aspirin 81 mg chewable tablet Take 81 mg by mouth daily. pantoprazole (PROTONIX) 40 mg tablet Take 40 mg by mouth daily as needed. CHANTIX STARTING MONTH BOX 0.5 mg (11)- 1 mg (42) DsPk TAKE 1 TABLET BY MOUTH IN MORNING WITH FOOD FOR 3 DAYS THEN INCREASE TO 1 TABLET BY MOUTH TWICE DAILY WITH FOOD THEREAFTER AS DIRECTED ON PA  Refills: 2      allopurinol (ZYLOPRIM) 100 mg tablet Take 1 Tab by mouth daily. Indications: treatment to prevent acute gout attack  Qty: 30 Tab, Refills: 0      senna-docusate (DARELL-COLACE) 8.6-50 mg per tablet Take 1 Tab by mouth daily. methadone (DOLOPHINE) 10 mg/mL solution Take 140 mg by mouth daily. Indications: excessive pain      colchicine 0.6 mg tablet Take 0.6 mg by mouth daily as needed (gout flare). melatonin 3 mg tablet Take 1 Tab by mouth nightly as needed. Qty: 10 Tab, Refills: 0             Follow up Care:    1. None in 1-2 weeks  2. Diet:  Regular Diet    Disposition:  Home. Advanced Directive:    Discharge Exam:  See today's note. CONSULTATIONS: ID    Significant Diagnostic Studies:   Recent Labs     10/30/20  0542 10/29/20  0443   WBC 8.6 10.1   HGB 9.2* 8.7*   HCT 30.3* 28.4*    283     Recent Labs     10/30/20  0542 10/29/20  0443    137   K 4.3 3.9    104   CO2 28 26   BUN 12 10   CREA 0.86 0.79   GLU 90 118*   CA 8.7 8.7     No results for input(s): ALT, AP, TBIL, TBILI, TP, ALB, GLOB, GGT, AML, LPSE in the last 72 hours. No lab exists for component: SGOT, GPT, AMYP, HLPSE  No results for input(s): INR, PTP, APTT, INREXT in the last 72 hours. No results for input(s): FE, TIBC, PSAT, FERR in the last 72 hours. No results for input(s): PH, PCO2, PO2 in the last 72 hours. No results for input(s): CPK, CKMB in the last 72 hours.     No lab exists for component: TROPONINI  Lab Results   Component Value Date/Time    Glucose (POC) 150 (H) 03/19/2018 11:45 AM    Glucose (POC) 123 (H) 03/18/2018 08:55 PM    Glucose (POC) 96 03/16/2018 04:58 PM    Glucose (POC) 116 (H) 12/12/2012 12:42 PM    Glucose (POC) 99 12/11/2012 11:27 PM             HOSPITAL COURSE & TREATMENT RENDERED:   1. Acute on chronic L foot/leg infection: Not infected. Has been to multiple facilities.  Plastics surg at [facility] recommended amputation.  Prior skin biopsy in April 2019 concerning for pyoderma gangrenosum.  Will hold on IV abx.  ID, vascular surg, and podiatry following. Eulogio Greene would care per wound care team. Bailey Pedraza declined to take patient. Arlette Ledezma this point, patient will need good wound care at home, then f/u with dermatology, plastics, and wound care clinic as an outpatient. SNF has decline pt. Pt refused amputation.      2. Acute on Chronic pain/depression/anxiety: cont methadone, neurontin and dilaudid PO.       3. Hx of hemorrhagic CVA w/ L hemiparesis: cont ASA, PT/OT     4. HTN: cont lisinopril     5. Hx of bladder CA: f/u with Urologist in [address] [place] [address]     6. Hypothyroid: cont synthroid         Discharged in stable condition.     Spent 35 minutes    Signed:  Brianna Ramirez MD  10/30/2020  10:37 AM

## 2020-10-30 NOTE — DISCHARGE INSTRUCTIONS
ACUTE DIAGNOSES:  Foot ulcer ([facility] [Q34.422]  Left foot infection [L08.9]    CHRONIC MEDICAL DIAGNOSES:  Problem List as of 10/30/2020 Date Reviewed: 9/9/2020          Codes Class Noted - Resolved    Pyoderma gangrenosum ICD-10-CM: L88  ICD-9-CM: 686.01  10/30/2020 - Present        Open wound, lower leg ICD-10-CM: T90.415P  ICD-9-CM: 891.0  5/30/2019 - Present        Depression ICD-10-CM: F32.9  ICD-9-CM: 853  5/20/2019 - Present        Foot infection ICD-10-CM: L08.9  ICD-9-CM: 686.9  4/23/2019 - Present        Nonadherence to medical treatment (Chronic) ICD-10-CM: Z91.19  ICD-9-CM: V15.81  10/12/2018 - Present        Sinus bradycardia ICD-10-CM: R00.1  ICD-9-CM: 427.89  10/12/2018 - Present        Gout (Chronic) ICD-10-CM: M10.9  ICD-9-CM: 274.9  10/12/2018 - Present        Hypothyroidism (Chronic) ICD-10-CM: E03.9  ICD-9-CM: 244.9  10/12/2018 - Present        Foot ulcer ([place] [address].) ICD-10-CM: L97.509  ICD-9-CM: 707.15  9/28/2018 - Present        Chronic obstructive pulmonary disease ([facility] ICD-10-CM: J44.9  ICD-9-CM: 496  8/8/2018 - Present        Chronic pain disorder ICD-10-CM: G89.4  ICD-9-CM: 338.4  8/8/2018 - Present        Hypokalemia ICD-10-CM: E87.6  ICD-9-CM: 276.8  8/8/2018 - Present        Seizure disorder ([facility] ICD-10-CM: G40.909  ICD-9-CM: 345.90  8/8/2018 - Present        Tobacco abuse ICD-10-CM: Z72.0  ICD-9-CM: 305.1  8/8/2018 - Present        Major depression ICD-10-CM: F32.9  ICD-9-CM: 296.20  6/6/2018 - Present        Uncomplicated opioid dependence ([facility] ICD-10-CM: F11.20  ICD-9-CM: 304.00  6/6/2018 - Present        Left foot infection ICD-10-CM: L08.9  ICD-9-CM: 686.9  3/13/2018 - Present        Malignant neoplasm of urinary bladder ([facility]) ICD-10-CM: C67.9  ICD-9-CM: 188.9  3/6/2018 - Present        FH: bladder cancer ICD-10-CM: Z80.52  ICD-9-CM: V16.52  3/6/2018 - Present        Long term current use of methadone for pain control ICD-10-CM: Z79.891  ICD-9-CM: V58.69  12/27/2017 - Present Major depressive disorder with current active episode ICD-10-CM: F32.9  ICD-9-CM: 296.30  12/27/2017 - Present        Physical debility ICD-10-CM: R53.81  ICD-9-CM: 799.3  12/27/2017 - Present        HTN (hypertension) ICD-10-CM: I10  ICD-9-CM: 401.9  12/21/2017 - Present        Cellulitis of left lower extremity ICD-10-CM: L03.116  ICD-9-CM: 682.6  12/21/2017 - Present        Drug overdose ICD-10-CM: T50.901A  ICD-9-CM: 977.9, E980.5  10/9/2016 - Present        Thalamic pain syndrome ICD-10-CM: G89.0  ICD-9-CM: 338.0  5/1/2014 - Present        Brain aneurysm ICD-10-CM: I67.1  ICD-9-CM: 437.3  12/30/2013 - Present        Chronic pain ICD-10-CM: G89.29  ICD-9-CM: 338.29  12/30/2013 - Present    Overview Signed 3/6/2018 12:03 PM by Mateo, [address] [place] [address]:   [facility]  Anomalous Vertebra? No    Allergies that may influence a procedure:   no    Medications that may influence a procedure:   No    PMSH that may influence a procedure:   no    Diagnostic Tests:  EMG/NCS: not done   MRI: Images independently viewed, agree with report   X-ray: Images independently viewed, agree with report     Prior treatments:  Physical Therapy: Yes  Medications tried: opioids  Outside Records: All outside records that may have been scanned into [facility] have been reviewed and discussed with the patient.   Procedures: none    This patient seen in consultation referred by Dr. Yoni Muñoz For neck pain   RECOMMENDATIONS:  - Oxycodone   - Oxycontin  - Discharge from practice    - High risk of cervical interventions discussed (spinal cord injury, paralysis, seizure, stroke, death)    If fails to progress,  - spine surgery consultation for open surgical procedure  - pain medicine consultation if non-operative care chosen    Key diagnostic test images:  reviewed             Neurologic gait dysfunction ICD-10-CM: R26.9  ICD-9-CM: 781.2  12/30/2013 - Present        Spasticity ICD-10-CM: R25.2  ICD-9-CM: 781.0  12/30/2013 - Present        Cerebral infarction [facility]) ICD-10-CM: I63.9  ICD-9-CM: 434.91  12/12/2012 - Present        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  ICD-9-CM: 338.0  12/12/2012 - Present        Cerebral hemorrhage with hemiparesis ([facility] ICD-10-CM: I61.9, G81.90  ICD-9-CM: 431, 342.90  4/15/2011 - Present        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  ICD-9-CM: 338.0  4/15/2011 - Present        Stroke ([facility] ICD-10-CM: I63.9  ICD-9-CM: 434.91  3/11/2011 - Present        Hypertension ICD-10-CM: I10  ICD-9-CM: 401.9  3/11/2011 - Present        Thromboembolism ([facility] ICD-10-CM: I74.9  ICD-9-CM: 444.9  3/11/2011 - Present        RESOLVED: Depressive disorder ICD-10-CM: F32.9  ICD-9-CM: 375  8/1/2018 - 8/2/2018              DISCHARGE MEDICATIONS:          · It is important that you take the medication exactly as they are prescribed. · Keep your medication in the bottles provided by the pharmacist and keep a list of the medication names, dosages, and times to be taken in your wallet. · Do not take other medications without consulting your doctor. DIET:  Regular Diet    ACTIVITY: Activity as tolerated    ADDITIONAL INFORMATION: If you experience any of the following symptoms then please call your primary care physician or return to the emergency room if you cannot get hold of your doctor: Fever, chills, nausea, vomiting, diarrhea, change in mentation, falling, bleeding, shortness of breath. FOLLOW  Follow up with dermatology in 1-2 weeks  Follow up with plastic surgery   . Information obtained by :  I understand that if any problems occur once I am at home I am to contact my physician. I understand and acknowledge receipt of the instructions indicated above.                                                                                                                                            Physician's or R.N.'s Signature Date/Time                                                                                                                                              Patient or Representative Signature                                                          Date/Time

## 2020-10-30 NOTE — PROGRESS NOTES
Discharge instructions reviewed with the patient and all questions answered.   Patient prepared for discharged home via [facility] at 6pm.

## 2020-10-30 NOTE — PROGRESS NOTES
I called  [facility] in [place] where the patient gets his prescriptions filled. The pharmacist said that a neighbor just picked up the patients medications.  JUVENCIO Marin

## 2020-10-30 NOTE — PROGRESS NOTES
2:06 PM  [facility] has declined. Ruiz Hubbard, JUVENCIO     The Following SNFs have declined -  [facility] [address]  Nicole Cabraleso  [address] [address]   The only facility that I have not heard back from at this time is [facility].  JUVENCIO Whitten

## 2020-10-31 VITALS
WEIGHT: 242 LBS | HEART RATE: 69 BPM | OXYGEN SATURATION: 97 % | DIASTOLIC BLOOD PRESSURE: 77 MMHG | TEMPERATURE: 98 F | SYSTOLIC BLOOD PRESSURE: 117 MMHG | HEIGHT: 73 IN | RESPIRATION RATE: 16 BRPM | BODY MASS INDEX: 32.07 KG/M2

## 2020-10-31 PROCEDURE — 74011250637 HC RX REV CODE- 250/637: Performed by: INTERNAL MEDICINE

## 2020-10-31 RX ADMIN — FAMOTIDINE 20 MG: 20 TABLET, FILM COATED ORAL at 08:56

## 2020-10-31 RX ADMIN — LEVOTHYROXINE SODIUM 112 MCG: 0.11 TABLET ORAL at 05:21

## 2020-10-31 RX ADMIN — ALLOPURINOL 100 MG: 100 TABLET ORAL at 08:56

## 2020-10-31 RX ADMIN — GABAPENTIN 100 MG: 100 CAPSULE ORAL at 08:55

## 2020-10-31 RX ADMIN — HYDROMORPHONE HYDROCHLORIDE 4 MG: 2 TABLET ORAL at 05:20

## 2020-10-31 RX ADMIN — NYSTATIN: 100000 POWDER TOPICAL at 09:00

## 2020-10-31 RX ADMIN — ASPIRIN 81 MG 81 MG: 81 TABLET ORAL at 08:56

## 2020-10-31 RX ADMIN — METHADONE HYDROCHLORIDE 140 MG: 10 TABLET ORAL at 08:55

## 2020-10-31 RX ADMIN — HYDROMORPHONE HYDROCHLORIDE 4 MG: 2 TABLET ORAL at 08:56

## 2020-10-31 RX ADMIN — TAMSULOSIN HYDROCHLORIDE 0.4 MG: 0.4 CAPSULE ORAL at 08:55

## 2020-10-31 RX ADMIN — PANTOPRAZOLE SODIUM 40 MG: 40 TABLET, DELAYED RELEASE ORAL at 05:21

## 2020-10-31 RX ADMIN — HYDROMORPHONE HYDROCHLORIDE 4 MG: 2 TABLET ORAL at 00:33

## 2020-10-31 RX ADMIN — LISINOPRIL 20 MG: 20 TABLET ORAL at 08:55

## 2020-10-31 NOTE — PROGRESS NOTES
Patient has been discharged, medicaid transportation did not bring a wheel. Per ns will allow them to take wheel chair to assist him home and bring back. Patient then stated he needed to go do number 2 while in the lobby. Tech brought him back up to the floor to have a bowel movement on the bedside commode he has been on the commode for over and hour now. Transportation has now left at 2100pm on the phone to see if they are comm

## 2020-10-31 NOTE — PROGRESS NOTES
[facility] transport arrived after 11pm with true wheelchair Will Osorio and a single transporter. Notified by unit that patient's friend Yuli Thomas calling the unit and stating that patient cannot be sent the previously arranged destination at this time. I spoke at length with Yuli Thomas. Yuli Jimenezenmanuel stating that patient can not be received this late at night. Yuli Thomas expressed concerns regarding the steps into the home, lack of appropriate lighting, wet and muddy entrance, difficulty navigating wheelchair up the stairs to get patient into the home, instability of homeowner and concerns that police would be called by homeowner if Mr. Jenny Pacheco arrived this late at night. Decision ultimately made to not transport the patient tonight. Yuli Jimenezenmanuel adamant that she will be here \"at daybreak\" to  the patient for discharge. She states that she has new living arrangements set-up starting tomorrow and has already picked up the patient's prescriptions. As a back-up, I rescheduled the medicaid wheelchair transport for 1000 tomorrow (Saturday 10-31) morning. They can be reached at [phone] to confirm, change or cancel transport arrangements. Yuli Thomas can be reached at [phone]. Primary RN Dawna rodriguez.

## 2020-10-31 NOTE — PROGRESS NOTES
10/31/2020  8:31 AM  Case management note    Patient did not go home on Friday night. Called medicaid this am  [phone]  Reference # [id number]  They have estimated ETA time for 1000am  Will notify patient. Patient wanted to be discharge by his  Maura Oliver [phone],   She picked him up.  He was discharged via wheelchair down to [address]

## 2020-10-31 NOTE — PROGRESS NOTES
Kimberlee Avila called and stated she refused to take Marisa Peed since its late. Advised transportation forget to bring wheel chair. Transportation sent a new  back for patient however per nursing supervisor felt it was late and wasn't sure  could take patient up 3 steps by himself. Kimberlee Avila also stated to NS that they are being evicted and if he comes tonight the landlord may see and call the police to have them evicted. Kimberlee Avila states will come and pick Quincy up in the morning and they will find some place to stay.

## 2020-10-31 NOTE — PROGRESS NOTES
Patient has been discharged and transportation did not have a wheel chair for patient per nursing sup will allow them to take wheel chair and bring back. Patient then states he needs to go have a bowel movement so tech brought patient back to 522. Patient has been on the commode for over 1 1/2 so transportation left.  Called them medicaid transportation and spoke with a Jacques Avelar stated will send 2 people and a wheel chair [id number] could take up to 3hr.

## 2020-11-02 NOTE — PROGRESS NOTES
Updated Dr. Jen Mendenhall on BP. Per Jen Aquino watch BP closely this evening into morning. If not BP read in 30 mins is not within parameters, push hydralazine per order no edema, no murmurs, regular rate and rhythm

## 2020-12-10 ENCOUNTER — HOSPITAL ENCOUNTER (OUTPATIENT)
Age: 55
Setting detail: OBSERVATION
Discharge: HOME HEALTH | End: 2020-12-22
Attending: EMERGENCY MEDICINE | Admitting: INTERNAL MEDICINE
Payer: COMMERCIAL

## 2020-12-10 ENCOUNTER — APPOINTMENT (OUTPATIENT)
Dept: CT IMAGING | Age: 55
End: 2020-12-10
Attending: NURSE PRACTITIONER
Payer: COMMERCIAL

## 2020-12-10 DIAGNOSIS — L03.90 CELLULITIS, UNSPECIFIED CELLULITIS SITE: ICD-10-CM

## 2020-12-10 DIAGNOSIS — L03.116 CELLULITIS OF LEFT LOWER EXTREMITY: ICD-10-CM

## 2020-12-10 DIAGNOSIS — I60.9 SAH (SUBARACHNOID HEMORRHAGE) (HCC): Primary | ICD-10-CM

## 2020-12-10 PROBLEM — S06.6XAA TRAUMATIC SUBARACHNOID HEMORRHAGE: Status: ACTIVE | Noted: 2020-12-10

## 2020-12-10 LAB
ANION GAP SERPL CALC-SCNC: 6 MMOL/L (ref 5–15)
BASOPHILS # BLD: 0.1 K/UL (ref 0–0.1)
BASOPHILS NFR BLD: 1 % (ref 0–1)
BUN SERPL-MCNC: 7 MG/DL (ref 6–20)
BUN/CREAT SERPL: 11 (ref 12–20)
CALCIUM SERPL-MCNC: 8.1 MG/DL (ref 8.5–10.1)
CHLORIDE SERPL-SCNC: 108 MMOL/L (ref 97–108)
CO2 SERPL-SCNC: 26 MMOL/L (ref 21–32)
CREAT SERPL-MCNC: 0.63 MG/DL (ref 0.7–1.3)
DIFFERENTIAL METHOD BLD: ABNORMAL
EOSINOPHIL # BLD: 0.5 K/UL (ref 0–0.4)
EOSINOPHIL NFR BLD: 4 % (ref 0–7)
ERYTHROCYTE [DISTWIDTH] IN BLOOD BY AUTOMATED COUNT: 16.8 % (ref 11.5–14.5)
GLUCOSE SERPL-MCNC: 91 MG/DL (ref 65–100)
HCT VFR BLD AUTO: 32.6 % (ref 36.6–50.3)
HGB BLD-MCNC: 10.1 G/DL (ref 12.1–17)
IMM GRANULOCYTES # BLD AUTO: 0.1 K/UL (ref 0–0.04)
IMM GRANULOCYTES NFR BLD AUTO: 1 % (ref 0–0.5)
LYMPHOCYTES # BLD: 2.4 K/UL (ref 0.8–3.5)
LYMPHOCYTES NFR BLD: 18 % (ref 12–49)
MCH RBC QN AUTO: 25.9 PG (ref 26–34)
MCHC RBC AUTO-ENTMCNC: 31 G/DL (ref 30–36.5)
MCV RBC AUTO: 83.6 FL (ref 80–99)
MONOCYTES # BLD: 1.1 K/UL (ref 0–1)
MONOCYTES NFR BLD: 9 % (ref 5–13)
NEUTS SEG # BLD: 8.7 K/UL (ref 1.8–8)
NEUTS SEG NFR BLD: 67 % (ref 32–75)
NRBC # BLD: 0 K/UL (ref 0–0.01)
NRBC BLD-RTO: 0 PER 100 WBC
PLATELET # BLD AUTO: 323 K/UL (ref 150–400)
PMV BLD AUTO: 9.8 FL (ref 8.9–12.9)
POTASSIUM SERPL-SCNC: 4 MMOL/L (ref 3.5–5.1)
RBC # BLD AUTO: 3.9 M/UL (ref 4.1–5.7)
SODIUM SERPL-SCNC: 140 MMOL/L (ref 136–145)
WBC # BLD AUTO: 12.8 K/UL (ref 4.1–11.1)

## 2020-12-10 PROCEDURE — 74011000258 HC RX REV CODE- 258: Performed by: INTERNAL MEDICINE

## 2020-12-10 PROCEDURE — 74011250636 HC RX REV CODE- 250/636: Performed by: INTERNAL MEDICINE

## 2020-12-10 PROCEDURE — 74011250637 HC RX REV CODE- 250/637: Performed by: INTERNAL MEDICINE

## 2020-12-10 PROCEDURE — 36415 COLL VENOUS BLD VENIPUNCTURE: CPT

## 2020-12-10 PROCEDURE — 85025 COMPLETE CBC W/AUTO DIFF WBC: CPT

## 2020-12-10 PROCEDURE — 65660000000 HC RM CCU STEPDOWN

## 2020-12-10 PROCEDURE — 80048 BASIC METABOLIC PNL TOTAL CA: CPT

## 2020-12-10 PROCEDURE — 99285 EMERGENCY DEPT VISIT HI MDM: CPT

## 2020-12-10 PROCEDURE — 96374 THER/PROPH/DIAG INJ IV PUSH: CPT

## 2020-12-10 PROCEDURE — 87040 BLOOD CULTURE FOR BACTERIA: CPT

## 2020-12-10 PROCEDURE — 70450 CT HEAD/BRAIN W/O DYE: CPT

## 2020-12-10 RX ORDER — LEVOTHYROXINE SODIUM 112 UG/1
112 TABLET ORAL
Status: DISCONTINUED | OUTPATIENT
Start: 2020-12-11 | End: 2020-12-22 | Stop reason: HOSPADM

## 2020-12-10 RX ORDER — POLYETHYLENE GLYCOL 3350 17 G/17G
17 POWDER, FOR SOLUTION ORAL DAILY PRN
Status: DISCONTINUED | OUTPATIENT
Start: 2020-12-10 | End: 2020-12-22 | Stop reason: HOSPADM

## 2020-12-10 RX ORDER — PROMETHAZINE HYDROCHLORIDE 25 MG/1
12.5 TABLET ORAL
Status: DISCONTINUED | OUTPATIENT
Start: 2020-12-10 | End: 2020-12-22 | Stop reason: HOSPADM

## 2020-12-10 RX ORDER — ONDANSETRON 2 MG/ML
4 INJECTION INTRAMUSCULAR; INTRAVENOUS
Status: DISCONTINUED | OUTPATIENT
Start: 2020-12-10 | End: 2020-12-22 | Stop reason: HOSPADM

## 2020-12-10 RX ORDER — ACETAMINOPHEN 325 MG/1
650 TABLET ORAL
Status: DISCONTINUED | OUTPATIENT
Start: 2020-12-10 | End: 2020-12-22 | Stop reason: HOSPADM

## 2020-12-10 RX ORDER — SODIUM CHLORIDE 0.9 % (FLUSH) 0.9 %
5-40 SYRINGE (ML) INJECTION AS NEEDED
Status: DISCONTINUED | OUTPATIENT
Start: 2020-12-10 | End: 2020-12-22 | Stop reason: HOSPADM

## 2020-12-10 RX ORDER — PANTOPRAZOLE SODIUM 40 MG/1
40 TABLET, DELAYED RELEASE ORAL
Status: DISCONTINUED | OUTPATIENT
Start: 2020-12-10 | End: 2020-12-22 | Stop reason: HOSPADM

## 2020-12-10 RX ORDER — SODIUM CHLORIDE 0.9 % (FLUSH) 0.9 %
5-40 SYRINGE (ML) INJECTION EVERY 8 HOURS
Status: DISCONTINUED | OUTPATIENT
Start: 2020-12-10 | End: 2020-12-22 | Stop reason: HOSPADM

## 2020-12-10 RX ORDER — METHADONE HYDROCHLORIDE 10 MG/1
140 TABLET ORAL DAILY
Status: DISCONTINUED | OUTPATIENT
Start: 2020-12-10 | End: 2020-12-22 | Stop reason: HOSPADM

## 2020-12-10 RX ORDER — GABAPENTIN 100 MG/1
100 CAPSULE ORAL 3 TIMES DAILY
Status: DISCONTINUED | OUTPATIENT
Start: 2020-12-10 | End: 2020-12-22 | Stop reason: HOSPADM

## 2020-12-10 RX ORDER — LISINOPRIL 20 MG/1
20 TABLET ORAL DAILY
Status: DISCONTINUED | OUTPATIENT
Start: 2020-12-11 | End: 2020-12-22 | Stop reason: HOSPADM

## 2020-12-10 RX ORDER — COLCHICINE 0.6 MG/1
0.6 TABLET ORAL
Status: DISCONTINUED | OUTPATIENT
Start: 2020-12-10 | End: 2020-12-22 | Stop reason: HOSPADM

## 2020-12-10 RX ORDER — LANOLIN ALCOHOL/MO/W.PET/CERES
3 CREAM (GRAM) TOPICAL
Status: DISCONTINUED | OUTPATIENT
Start: 2020-12-10 | End: 2020-12-22 | Stop reason: HOSPADM

## 2020-12-10 RX ORDER — AMOXICILLIN 250 MG
1 CAPSULE ORAL DAILY
Status: DISCONTINUED | OUTPATIENT
Start: 2020-12-11 | End: 2020-12-22 | Stop reason: HOSPADM

## 2020-12-10 RX ORDER — ALLOPURINOL 100 MG/1
100 TABLET ORAL DAILY
Status: DISCONTINUED | OUTPATIENT
Start: 2020-12-11 | End: 2020-12-22 | Stop reason: HOSPADM

## 2020-12-10 RX ORDER — ACETAMINOPHEN 650 MG/1
650 SUPPOSITORY RECTAL
Status: DISCONTINUED | OUTPATIENT
Start: 2020-12-10 | End: 2020-12-22 | Stop reason: HOSPADM

## 2020-12-10 RX ORDER — TAMSULOSIN HYDROCHLORIDE 0.4 MG/1
0.4 CAPSULE ORAL
Status: DISCONTINUED | OUTPATIENT
Start: 2020-12-10 | End: 2020-12-22 | Stop reason: HOSPADM

## 2020-12-10 RX ADMIN — GABAPENTIN 100 MG: 100 CAPSULE ORAL at 16:31

## 2020-12-10 RX ADMIN — COLCHICINE 0.6 MG: 0.6 TABLET, FILM COATED ORAL at 22:32

## 2020-12-10 RX ADMIN — PANTOPRAZOLE SODIUM 40 MG: 40 TABLET, DELAYED RELEASE ORAL at 16:31

## 2020-12-10 RX ADMIN — PIPERACILLIN AND TAZOBACTAM 3.38 G: 3; .375 INJECTION, POWDER, LYOPHILIZED, FOR SOLUTION INTRAVENOUS at 16:56

## 2020-12-10 RX ADMIN — ACETAMINOPHEN 650 MG: 325 TABLET ORAL at 22:52

## 2020-12-10 RX ADMIN — GABAPENTIN 100 MG: 100 CAPSULE ORAL at 22:32

## 2020-12-10 RX ADMIN — METHADONE HYDROCHLORIDE 140 MG: 10 TABLET ORAL at 16:30

## 2020-12-10 NOTE — ED PROVIDER NOTES
HPI the patient is a transfer from [facility] for treatment/observation of a traumatic subarachnoid hemorrhage and also treatment of a left leg wound infection. The patient fell last night around midnight, hitting his head. He has a laceration to the left parietal area that has been stapled. The patient complains of a generalized headache which is unchanged since the fall. He also has a chronic wound to his left leg/foot that appears to be secondarily infected. The physician at [facility] spoke with the intensivist here and they accepted the patient in transfer. Past Medical History:   Diagnosis Date    Aneurysm [facility])     (with stroke)    Bladder tumor     Cancer ([address]     bladder CA    Chronic pain     Family history of bladder cancer     GERD (gastroesophageal reflux disease)     Gout     History of vascular access device 04/25/2019    [facility] VAT 4 FR MIDLINE R Cephalic Limited access    History of vascular access device 04/26/2019    4 fr Midline right Cephalic midline access    Hypercholesterolemia     Hypertension     Lesion of bladder     Seizures ([address]     Stroke ([address] 2006    left sided weakness    Thromboembolus ([address]     left leg    Thyroid disease     TIA (transient ischemic attack) 2012       Past Surgical History:   Procedure Laterality Date    HX ORTHOPAEDIC      R arthroscopy    HX OTHER SURGICAL      x2 L foot    HX UROLOGICAL  04/20/2018    Cystoscopy, TURBT (greater than 5 cm resected) Dr. Crispin Mccord, [facility].     KNEE ARTHROSCP HARV      left knee    TRANSURETHRAL RESEC BLADDER NECK  08/10/2018         Family History:   Problem Relation Age of Onset    Diabetes Father     Lung Disease Father     Diabetes Sister     Diabetes Brother     Cancer Paternal Grandfather         Bladder       Social History     Socioeconomic History    Marital status:      Spouse name: Not on file    Number of children: Not on file    Years of education: Not on file    Highest education level: Not on file   Occupational History    Not on file   Social Needs    Financial resource strain: Not very hard    Food insecurity     Worry: Never true     Inability: Never true    Transportation needs     Medical: No     Non-medical: No   Tobacco Use    Smoking status: Current Every Day Smoker     Packs/day: 0.50    Smokeless tobacco: Never Used    Tobacco comment: instructed not to smoke 24 hrs prior surgery   Substance and Sexual Activity    Alcohol use: Yes     Alcohol/week: 6.0 standard drinks     Types: 6 Cans of beer per week     Comment: occasional    Drug use: Yes     Types: Prescription    Sexual activity: Yes   Lifestyle    Physical activity     Days per week: 7 days     Minutes per session: 30 min    Stress: Not at all   Relationships    Social connections     Talks on phone: Not on file     Gets together: Three times a week     Attends Congregation service: Patient refused     Active member of club or organization: Patient refused     Attends meetings of clubs or organizations: Never     Relationship status:     Intimate partner violence     Fear of current or ex partner: No     Emotionally abused: No     Physically abused: No     Forced sexual activity: No   Other Topics Concern     Service No    Blood Transfusions No     Comment: refused to make decision    Caffeine Concern No    Occupational Exposure No    Hobby Hazards No    Sleep Concern No    Stress Concern No    Weight Concern No    Special Diet No    Back Care No    Exercise No    Bike Helmet No    Seat Belt Yes    Self-Exams No   Social History Narrative    60year old  male admitted for reported SI in the context of bladder CA, chronic pain, homelessness, and opiod dependence. Pt has been in multiple psychiatric admissions for the same compliants in the last 2 years,         ALLERGIES: Sulfamethoxazole-trimethoprim; Ciprofloxacin; Codeine; Cymbalta [duloxetine];  Lyrica [pregabalin]; Sulfa (sulfonamide antibiotics); Ultram [tramadol]; and Vancomycin    Review of Systems   Constitutional: Negative for fever. HENT: Negative for voice change. Eyes: Negative for pain. Respiratory: Negative for cough and shortness of breath. Cardiovascular: Negative for chest pain. Gastrointestinal: Negative for abdominal pain, nausea and vomiting. Genitourinary: Negative for flank pain. Musculoskeletal: Negative for back pain. Skin: Positive for wound. Negative for rash. Neurological: Positive for headaches. Psychiatric/Behavioral: Negative for confusion. There were no vitals filed for this visit. Physical Exam  Constitutional:       General: He is not in acute distress. Appearance: He is well-developed. HENT:      Head: Normocephalic. Comments: Stapled laceration to the left parietal area  Eyes:      Pupils: Pupils are equal, round, and reactive to light. Neck:      Musculoskeletal: Normal range of motion. Cardiovascular:      Rate and Rhythm: Normal rate. Heart sounds: No murmur. Pulmonary:      Effort: Pulmonary effort is normal.      Breath sounds: Normal breath sounds. Abdominal:      Palpations: Abdomen is soft. Tenderness: There is no abdominal tenderness. Musculoskeletal: Normal range of motion. Skin:     General: Skin is warm and dry. Capillary Refill: Capillary refill takes less than 2 seconds. Comments: Chronic wound to the left leg/foot that appears to be secondarily infected   Neurological:      General: No focal deficit present. Mental Status: He is alert and oriented to person, place, and time.    Psychiatric:         Behavior: Behavior normal.          MDM       Procedures

## 2020-12-10 NOTE — PROGRESS NOTES
Neurosurgery Progress Note  Lorena Phillips ACNP-BC          Admit Date: 12/10/2020   LOS: 0 days        Daily Progress Note: 12/10/2020      Subjective: The patient has a history of a longstanding chronic left foot ulcer and non-healing wound since at least 2018. He is on methadone for chronic pain, followed by [facility]. He is followed by a dermatologist in [place] for his chronic foot wound. He also has a history of CVA in  that left him with residual left hemiparesis and hypersentivity. He lost his balance last night when his left leg gave out and fell striking his head on the door hinge, which caused a head laceration. He states he was standing up from a rollator when he lost his balance. He states he does not usually walk due to his foot wound. He denies being on any blood thinners. He apparently had a few drinks prior to arrival. His head CT at [address] [place] [address] revealed mild acute SAH in the right parietal area. Patient did not want to be transferred to [address] [place] [address]. He requested initial transfer to [facility] in [place], but they do not have neurosurgery, so he was transferred to [facility]. Pt complains of a headache. He had staples placed in his posterior head laceration. He had a repeat head CT completed in the ER. No acute intracranial hemorrhage was visualized on this scan. Denies chest pain, nausea, vomiting, difficulty swallowing, and dyspnea. Objective:     Vital signs  Temp (24hrs), Av °F (36.7 °C), Min:98 °F (36.7 °C), Max:98 °F (36.7 °C)   No intake/output data recorded. No intake/output data recorded. Visit Vitals  BP (!) 181/79   Pulse (!) 56   Temp 98 °F (36.7 °C)   Resp 18   Wt 107.3 kg (236 lb 8.9 oz)   SpO2 98%   BMI 31.21 kg/m²      O2 Device: Room air     Pain control  Pain Assessment  Pain Scale 1: Numeric (0 - 10)  Pain Intensity 1: 8  Pain Location 1: Foot  Pain Orientation 1: Left    PT/OT  Gait                 Physical Exam:  Gen:NAD.   Neuro: A&Ox3. Follows commands. Speech clear. Affect normal.  PERRL. EOMI. Some left central facial weakness. Tongue midline. Strength 5/5 RUE/RLE. Left hemiplegia and spasticity from prior stroke. Gait deferred. CT head without contrast on 12/10/2020 around 0245 from outside facility read as mild acute SAH in the right parietal lobe. CT head without contrast on 12/10/2020 at 1331 shows no acute intracranial hemorrhage visualized. 24 hour results:    No results found for this or any previous visit (from the past 24 hour(s)). Assessment:     Active Problems:    Traumatic subarachnoid hemorrhage ([facility] (12/10/2020)        Plan:   1. Traumatic SAH without LOC   - Repeat head CT shows this has resolved   - he does not need admission to ICU   - nothing surgical   - headache likely due to concussive symptoms   - if admitted - neuro checks q4h while awake  2. Chronic left foot wound and pain   - medical service to evaluate    Activity: up with assist  DVT ppx: SCDs  Dispo: home    Plan d/w Dr. Johana Estrella, [address] doctor, ICU doctor. Will sign off. Call if questions.       Lashawn Cabello, NP

## 2020-12-10 NOTE — WOUND CARE
WOCN Note:     New consult placed for assessment of left lower extremity. Assessed in ED22  PPE: mask, goggles and gloves. Awaiting podiatry consult. Chart reviewed. Admitted DX:  SAH (subarachnoid hemorrhage)  Past Medical History:   Diagnosis Date    Aneurysm ([address]     (with stroke)    Bladder tumor     Cancer ([address]     bladder CA    Chronic pain     Family history of bladder cancer     GERD (gastroesophageal reflux disease)     Gout     History of vascular access device 04/25/2019    [facility] VAT 4 FR MIDLINE R Cephalic Limited access    History of vascular access device 04/26/2019    4 fr Midline right Cephalic midline access    Hypercholesterolemia     Hypertension     Lesion of bladder     Seizures ([address]     Stroke ([address] 2006    left sided weakness    Thromboembolus ([address]     left leg    Thyroid disease     TIA (transient ischemic attack) 2012       Assessment:   Patient is A&O x 3, communicative, continent and mobile independently on stretcher. Bed: ED stretcher  Patient reports left foot pain. Right heel intact without erythema. Sacrum and buttocks intact without erythema. 1. POA Left lower extremity ulcer covered by dry dressing. Patient declined to have the dressing removed. 2.  Head laceration from fall prior to admit:  Approximated with staples. Wound, Pressure Prevention & Skin Care Recommendations:    1. Minimize layers of linen/pads under patient to optimize support surface. 2.  Turn/reposition approximately every 2 hours and offload heels. 3.  Manage moisture/ Keep skin folds clean and dry. 4.  Left lower extremity:  Wound care per podiatry. Discussed above plan with patient and Paloma Paiz RN. Transition of Care: Plan to follow as needed.     AIXA Hill RN [address] Inpatient Wound Care  Available on Perfect Serve  Pager [phone]  Office [phone]

## 2020-12-10 NOTE — H&P
History and Physical  Primary Care Provider: None    Subjective:     Jackelin Wilks is a 53 y.o. male who presents with     As per initial admission summary  HPI the patient is a transfer from [facility] for treatment/observation of a traumatic subarachnoid hemorrhage and also treatment of a left leg wound infection. The patient fell last night around midnight, hitting his head. He has a laceration to the left parietal area that has been stapled. The patient complains of a generalized headache which is unchanged since the fall. He also has a chronic wound to his left leg/foot that appears to be secondarily infected. The physician at [facility] spoke with the intensivist here and they accepted the patient in transfer. On my HP. Patient with past medical history significant for chronic left foot ulcers since at least 2018 on chronic methadone and followed by behavioral health clinic history of stroke in 2006 patient was transferred from [facility] as per documentation he does not want to go over there patient had a fall overnight and hit his head there is a laceration to the left parietal area that has been scattered patient complained of generalized headache and also concerned about chronic wound on the left foot patient was initially accepted to the ICU but repeat imaging here at [facility] did not show any bleeding patient does not report any fever, chest pain, shortness of breath or any other associated symptoms. He told me he lives by himself and one of his friend helps him with the dressing of the wound. As per documentation patient was previously recommended amputation by the plastic surgery but he refused admitted to hospitalist service podiatry and infectious disease consulted        Review of Systems:    A comprehensive review of systems was negative except for that written in the History of Present Illness.      Past Medical History:   Diagnosis Date    Aneurysm [facility])     (with stroke)    Bladder tumor     Cancer [facility])     bladder CA    Chronic pain     Family history of bladder cancer     GERD (gastroesophageal reflux disease)     Gout     History of vascular access device 04/25/2019    [facility] VAT 4 FR MIDLINE R Cephalic Limited access    History of vascular access device 04/26/2019    4 fr Midline right Cephalic midline access    Hypercholesterolemia     Hypertension     Lesion of bladder     [facility] ([facility]     Stroke [facility]) 2006    left sided weakness    Thromboembolus ([facility] [address] [facility]     left leg    Thyroid disease     TIA (transient ischemic attack) 2012      Past Surgical History:   Procedure Laterality Date    HX ORTHOPAEDIC      R arthroscopy    HX OTHER SURGICAL      x2 L foot    HX UROLOGICAL  04/20/2018    Cystoscopy, TURBT (greater than 5 cm resected) Dr. Allyn Severs, [facility].  KNEE ARTHROSCP HARV      left knee    TRANSURETHRAL RESEC BLADDER NECK  08/10/2018     Prior to Admission medications    Medication Sig Start Date End Date Taking? Authorizing Provider   lidocaine (XYLOCAINE) 2 % jelly Apply to affected areas  Indications: wound care 10/30/20   Paulo Ren MD   gabapentin (NEURONTIN) 100 mg capsule Take 100 mg by mouth three (3) times daily. Provider, Historical   levothyroxine (SYNTHROID) 112 mcg tablet Take 112 mcg by mouth Daily (before breakfast). Provider, Historical   lisinopriL (PRINIVIL, ZESTRIL) 20 mg tablet Take 20 mg by mouth daily. Provider, Historical   naproxen sodium (Aleve) 220 mg tablet Take 220 mg by mouth three (3) times daily as needed. Provider, Historical   ibuprofen (MOTRIN) 400 mg tablet Take 800 mg by mouth every six (6) hours as needed. 8/5/19   Provider, Historical   tamsulosin (FLOMAX) 0.4 mg capsule TAKE ONE CAPSULE BY MOUTH ONE TIME DAILY (after dinner) 8/7/20   Alesia Jimenez MD   aspirin 81 mg chewable tablet Take 81 mg by mouth daily. Provider, Historical   pantoprazole (PROTONIX) 40 mg tablet Take 40 mg by mouth daily as needed.  7/17/19 Provider, Historical   CHANTIX STARTING MONTH BOX 0.5 mg (11)- 1 mg (42) DsPk TAKE 1 TABLET BY MOUTH IN MORNING WITH FOOD FOR 3 DAYS THEN INCREASE TO 1 TABLET BY MOUTH TWICE DAILY WITH FOOD THEREAFTER AS DIRECTED ON PA 6/21/19   Provider, Historical   allopurinol (ZYLOPRIM) 100 mg tablet Take 1 Tab by mouth daily. Indications: treatment to prevent acute gout attack 5/24/19   Nahun Gibson A, NP   senna-docusate (DARELL-COLACE) 8.6-50 mg per tablet Take 1 Tab by mouth daily. Provider, Historical   methadone (DOLOPHINE) 10 mg/mL solution Take 140 mg by mouth daily. Indications: excessive pain    Provider, Historical   colchicine 0.6 mg tablet Take 0.6 mg by mouth daily as needed (gout flare). Provider, Historical   melatonin 3 mg tablet Take 1 Tab by mouth nightly as needed. 10/12/18   Elizabeth Conde MD     Allergies   Allergen Reactions    Sulfamethoxazole-Trimethoprim Rash     L eye and L hand    Ciprofloxacin Hives     Per pcp records    Codeine Hives     Tolerates dilaudid, oxycodone    Cymbalta [Duloxetine] Other (comments)     Confusion and memory loss    Lyrica [Pregabalin] Hives    Sulfa (Sulfonamide Antibiotics) Rash    Ultram [Tramadol] Hives    Vancomycin Shortness of Breath      Family History   Problem Relation Age of Onset    Diabetes Father     Lung Disease Father     Diabetes Sister     Diabetes Brother     Cancer Paternal Grandfather         Bladder        SOCIAL HISTORY:  Patient resides at Home. Patient ambulates with help .  Smoking status: Current Every Day Smoker       Packs/day: 0.50    Smokeless tobacco: Never Used    Tobacco comment: instructed not to smoke 24 hrs prior surgery   Substance and Sexual Activity    Alcohol use: Yes       Alcohol/week: 6.0 standard drinks       Types: 6 Cans of beer per week       Comment: occasional    Drug use:  Yes       Types: Prescription    Sexual activity: Yes             Objective:   I had a face to face encounter with this patient and independently examined them on December 10, 2020 as outlined below:    Physical Exam:   Visit Vitals  BP (!) 181/79   Pulse (!) 56   Temp 98 °F (36.7 °C)   Resp 18   Wt 107.3 kg (236 lb 8.9 oz)   SpO2 98%   BMI 31.21 kg/m²     General:  Alert, cooperative, no distress, appears stated age. Head:  Normocephalic, staples left parietal area    Lungs:   Clear to auscultation bilaterally. Chest wall:  No tenderness or deformity. Heart:  Regular rate and rhythm, S1, S2 normal, no murmur, click, rub or gallop. Abdomen:   Soft, non-tender. Bowel sounds normal. No masses,  No organomegaly. Extremities: Chronic wound left foot    Pulses: 2+ and symmetric all extremities. Neurologic: CNII-XII intact. Normal strength, sensation and reflexes throughout. Data Review: All diagnostic labs and studies have been reviewed. Assessment:     Active Problems:    Traumatic subarachnoid hemorrhage ([facility] [address] (12/10/2020)      SAH (subarachnoid hemorrhage) ([facility] [address] (12/10/2020)        Plan:     1.  Traumatic sub arachnoid hamemorrhage  Repeat CT showed it has been resolved  Neurosurgery signed off  neuro checks for now    # chronic foot ulcer  Started on zosyn  Prior history of wound culture positive   Podiatry and ID consulted  Wound care nurse  IV zosyn for now  Wound cultures ordered     #chronic methadone use  Will continue home dose    # hypothyroidism  Continue synthroid    #HTN  Continue lisinopril       FUNCTIONAL STATUS PRIOR TO HOSPITALIZATION (including history of recent falls): ambulatory with help     Signed By: Corky Merino MD     December 10, 2020

## 2020-12-11 LAB
ALBUMIN SERPL-MCNC: 3 G/DL (ref 3.5–5)
ALBUMIN/GLOB SERPL: 0.9 {RATIO} (ref 1.1–2.2)
ALP SERPL-CCNC: 171 U/L (ref 45–117)
ALT SERPL-CCNC: 12 U/L (ref 12–78)
ANION GAP SERPL CALC-SCNC: 6 MMOL/L (ref 5–15)
AST SERPL-CCNC: 9 U/L (ref 15–37)
BASOPHILS # BLD: 0.1 K/UL (ref 0–0.1)
BASOPHILS NFR BLD: 1 % (ref 0–1)
BILIRUB SERPL-MCNC: 0.3 MG/DL (ref 0.2–1)
BUN SERPL-MCNC: 6 MG/DL (ref 6–20)
BUN/CREAT SERPL: 9 (ref 12–20)
CALCIUM SERPL-MCNC: 8.6 MG/DL (ref 8.5–10.1)
CHLORIDE SERPL-SCNC: 108 MMOL/L (ref 97–108)
CO2 SERPL-SCNC: 28 MMOL/L (ref 21–32)
CREAT SERPL-MCNC: 0.68 MG/DL (ref 0.7–1.3)
DIFFERENTIAL METHOD BLD: ABNORMAL
EOSINOPHIL # BLD: 0.7 K/UL (ref 0–0.4)
EOSINOPHIL NFR BLD: 6 % (ref 0–7)
ERYTHROCYTE [DISTWIDTH] IN BLOOD BY AUTOMATED COUNT: 16.9 % (ref 11.5–14.5)
GLOBULIN SER CALC-MCNC: 3.4 G/DL (ref 2–4)
GLUCOSE SERPL-MCNC: 90 MG/DL (ref 65–100)
HCT VFR BLD AUTO: 34 % (ref 36.6–50.3)
HGB BLD-MCNC: 10.3 G/DL (ref 12.1–17)
IMM GRANULOCYTES # BLD AUTO: 0.1 K/UL (ref 0–0.04)
IMM GRANULOCYTES NFR BLD AUTO: 0 % (ref 0–0.5)
LYMPHOCYTES # BLD: 3 K/UL (ref 0.8–3.5)
LYMPHOCYTES NFR BLD: 24 % (ref 12–49)
MCH RBC QN AUTO: 25.4 PG (ref 26–34)
MCHC RBC AUTO-ENTMCNC: 30.3 G/DL (ref 30–36.5)
MCV RBC AUTO: 83.7 FL (ref 80–99)
MONOCYTES # BLD: 0.9 K/UL (ref 0–1)
MONOCYTES NFR BLD: 7 % (ref 5–13)
NEUTS SEG # BLD: 7.9 K/UL (ref 1.8–8)
NEUTS SEG NFR BLD: 62 % (ref 32–75)
NRBC # BLD: 0 K/UL (ref 0–0.01)
NRBC BLD-RTO: 0 PER 100 WBC
PLATELET # BLD AUTO: 371 K/UL (ref 150–400)
PMV BLD AUTO: 9.8 FL (ref 8.9–12.9)
POTASSIUM SERPL-SCNC: 3.7 MMOL/L (ref 3.5–5.1)
PROT SERPL-MCNC: 6.4 G/DL (ref 6.4–8.2)
RBC # BLD AUTO: 4.06 M/UL (ref 4.1–5.7)
SODIUM SERPL-SCNC: 142 MMOL/L (ref 136–145)
WBC # BLD AUTO: 12.7 K/UL (ref 4.1–11.1)

## 2020-12-11 PROCEDURE — 99218 HC RM OBSERVATION: CPT

## 2020-12-11 PROCEDURE — 80053 COMPREHEN METABOLIC PANEL: CPT

## 2020-12-11 PROCEDURE — 97165 OT EVAL LOW COMPLEX 30 MIN: CPT

## 2020-12-11 PROCEDURE — 97530 THERAPEUTIC ACTIVITIES: CPT

## 2020-12-11 PROCEDURE — 97161 PT EVAL LOW COMPLEX 20 MIN: CPT

## 2020-12-11 PROCEDURE — 96376 TX/PRO/DX INJ SAME DRUG ADON: CPT

## 2020-12-11 PROCEDURE — 74011250636 HC RX REV CODE- 250/636: Performed by: INTERNAL MEDICINE

## 2020-12-11 PROCEDURE — 74011250637 HC RX REV CODE- 250/637: Performed by: INTERNAL MEDICINE

## 2020-12-11 PROCEDURE — 85025 COMPLETE CBC W/AUTO DIFF WBC: CPT

## 2020-12-11 PROCEDURE — 74011000258 HC RX REV CODE- 258: Performed by: INTERNAL MEDICINE

## 2020-12-11 PROCEDURE — 36415 COLL VENOUS BLD VENIPUNCTURE: CPT

## 2020-12-11 RX ORDER — DIPHENHYDRAMINE HCL 25 MG
25 CAPSULE ORAL
Status: DISCONTINUED | OUTPATIENT
Start: 2020-12-11 | End: 2020-12-22 | Stop reason: HOSPADM

## 2020-12-11 RX ORDER — LIDOCAINE 40 MG/G
CREAM TOPICAL AS NEEDED
Status: DISCONTINUED | OUTPATIENT
Start: 2020-12-11 | End: 2020-12-22 | Stop reason: HOSPADM

## 2020-12-11 RX ORDER — HYDROCODONE BITARTRATE AND ACETAMINOPHEN 5; 325 MG/1; MG/1
1 TABLET ORAL ONCE
Status: COMPLETED | OUTPATIENT
Start: 2020-12-11 | End: 2020-12-11

## 2020-12-11 RX ADMIN — PANTOPRAZOLE SODIUM 40 MG: 40 TABLET, DELAYED RELEASE ORAL at 07:02

## 2020-12-11 RX ADMIN — PANTOPRAZOLE SODIUM 40 MG: 40 TABLET, DELAYED RELEASE ORAL at 15:39

## 2020-12-11 RX ADMIN — Medication 3 MG: at 22:15

## 2020-12-11 RX ADMIN — ACETAMINOPHEN 650 MG: 325 TABLET ORAL at 22:15

## 2020-12-11 RX ADMIN — ALLOPURINOL 100 MG: 100 TABLET ORAL at 10:37

## 2020-12-11 RX ADMIN — PIPERACILLIN AND TAZOBACTAM 3.38 G: 3; .375 INJECTION, POWDER, LYOPHILIZED, FOR SOLUTION INTRAVENOUS at 22:35

## 2020-12-11 RX ADMIN — GABAPENTIN 100 MG: 100 CAPSULE ORAL at 10:36

## 2020-12-11 RX ADMIN — LEVOTHYROXINE SODIUM 112 MCG: 0.11 TABLET ORAL at 07:02

## 2020-12-11 RX ADMIN — LISINOPRIL 20 MG: 20 TABLET ORAL at 10:36

## 2020-12-11 RX ADMIN — ACETAMINOPHEN 650 MG: 325 TABLET ORAL at 15:39

## 2020-12-11 RX ADMIN — GABAPENTIN 100 MG: 100 CAPSULE ORAL at 15:39

## 2020-12-11 RX ADMIN — METHADONE HYDROCHLORIDE 140 MG: 10 TABLET ORAL at 10:36

## 2020-12-11 RX ADMIN — PIPERACILLIN AND TAZOBACTAM 3.38 G: 3; .375 INJECTION, POWDER, LYOPHILIZED, FOR SOLUTION INTRAVENOUS at 07:03

## 2020-12-11 RX ADMIN — Medication 10 ML: at 22:17

## 2020-12-11 RX ADMIN — DIPHENHYDRAMINE HYDROCHLORIDE 25 MG: 25 CAPSULE ORAL at 16:15

## 2020-12-11 RX ADMIN — PIPERACILLIN AND TAZOBACTAM 3.38 G: 3; .375 INJECTION, POWDER, LYOPHILIZED, FOR SOLUTION INTRAVENOUS at 15:38

## 2020-12-11 RX ADMIN — Medication 10 ML: at 14:00

## 2020-12-11 RX ADMIN — TAMSULOSIN HYDROCHLORIDE 0.4 MG: 0.4 CAPSULE ORAL at 17:49

## 2020-12-11 RX ADMIN — HYDROCODONE BITARTRATE AND ACETAMINOPHEN 1 TABLET: 5; 325 TABLET ORAL at 16:15

## 2020-12-11 RX ADMIN — GABAPENTIN 100 MG: 100 CAPSULE ORAL at 22:15

## 2020-12-11 NOTE — PROGRESS NOTES
Hospitalist Progress Note  Ivan Russell MD  Answering service: [phone] from in house phone        Date of Service:  2020  NAME:  Lord Galeazzi  :  1965  MRN:  [id number]      Admission Summary:      Lord Galeazzi is a 53 y.o. male who presents with      As per initial admission summary  HPI the patient is a transfer from [address] [place] [address] for treatment/observation of a traumatic subarachnoid hemorrhage and also treatment of a left leg wound infection.  The patient fell last night around midnight, hitting his head. [facility] has a laceration to the left parietal area that has been stapled.  The patient complains of a generalized headache which is unchanged since the fall.  He also has a chronic wound to his left leg/foot that appears to be secondarily infected.  The physician at [address] [place] [address] spoke with the intensivist here and they accepted the patient in transfer.     On my HP. Patient with past medical history significant for chronic left foot ulcers since at least 2018 on chronic methadone and followed by behavioral health clinic history of stroke in  patient was transferred from [address] [place] [address] as per documentation he does not want to go over there patient had a fall overnight and hit his head there is a laceration to the left parietal area that has been scattered patient complained of generalized headache and also concerned about chronic wound on the left foot patient was initially accepted to the ICU but repeat imaging here at [facility] did not show any bleeding patient does not report any fever, chest pain, shortness of breath or any other associated symptoms. He told me he lives by himself and one of his friend helps him with the dressing of the wound.   As per documentation patient was previously recommended amputation by the plastic surgery but he refused admitted to hospitalist service podiatry and infectious disease consulted       Interval history / Subjective:     Patient seen for Follow up of foot wound     Patient seen and examined by the bedside, Labs, images and notes reviewed  Patient complaining of foot pain. ID and podiatry consult still pending. Assessment & Plan:     1. Traumatic sub arachnoid hamemorrhage  Repeat CT showed it has been resolved  Neurosurgery signed off  neuro checks for now     # chronic foot ulcer  Started on zosyn  Prior history of wound culture positive   Podiatry and ID consulted, pending   Wound care nurse  IV zosyn for now  Wound cultures ordered      #chronic methadone use  Will continue home dose     # hypothyroidism  Continue synthroid     #HTN  Continue lisinopril       Code status: full code   DVT prophylaxis: scds    Care Plan discussed with: Patient/Family  Disposition: SNF/LTC and TBD     Hospital Problems  Date Reviewed: 9/9/2020          Codes Class Noted POA    Traumatic subarachnoid hemorrhage ([facility] ICD-10-CM: T74.8V2H  ICD-9-CM: 852.00  12/10/2020 Unknown        SAH (subarachnoid hemorrhage) ([facility] ICD-10-CM: I60.9  ICD-9-CM: 621  12/10/2020 Unknown                Review of Systems:   A comprehensive review of systems was negative except for that written in the HPI. Vital Signs:    Last 24hrs VS reviewed since prior progress note. Most recent are:  Visit Vitals  BP (!) 152/85 (BP 1 Location: Right arm, BP Patient Position: At rest)   Pulse 78   Temp 98.2 °F (36.8 °C)   Resp 16   Wt 107.9 kg (237 lb 14 oz)   SpO2 98%   BMI 31.38 kg/m²       No intake or output data in the 24 hours ending 12/11/20 6055     Physical Examination:   I had a face to face encounter with this patient and independently examined them on December 11, 2020 as outlined below:        General:  Alert, cooperative, no distress, appears stated age. Head:  Normocephalic, staples left parietal area    Lungs:   Clear to auscultation bilaterally. Chest wall:  No tenderness or deformity.    Heart:  Regular rate and rhythm, S1, S2 normal, no murmur, click, rub or gallop. Abdomen:   Soft, non-tender. Bowel sounds normal. No masses,  No organomegaly. Extremities: Chronic wound left foot , dressing    Pulses: 2+ and symmetric all extremities. Neurologic: CNII-XII intact. Normal strength, sensation and reflexes throughout. Data Review:    Review and/or order of clinical lab test  Review and/or order of tests in the radiology section of [facility]  Review and/or order of tests in the medicine section of [facility]      Labs:     Recent Labs     12/11/20  0700 12/10/20  1459   WBC 12.7* 12.8*   HGB 10.3* 10.1*   HCT 34.0* 32.6*    323     Recent Labs     12/11/20  0700 12/10/20  1459    140   K 3.7 4.0    108   CO2 28 26   BUN 6 7   CREA 0.68* 0.63*   GLU 90 91   CA 8.6 8.1*     Recent Labs     12/11/20  0700   ALT 12   *   TBILI 0.3   TP 6.4   ALB 3.0*   GLOB 3.4     No results for input(s): INR, PTP, APTT, INREXT in the last 72 hours. No results for input(s): FE, TIBC, PSAT, FERR in the last 72 hours. Lab Results   Component Value Date/Time    Folate 8.6 12/13/2012 03:20 AM      No results for input(s): PH, PCO2, PO2 in the last 72 hours. No results for input(s): CPK, CKNDX, TROIQ in the last 72 hours.     No lab exists for component: CPKMB  Lab Results   Component Value Date/Time    Cholesterol, total 152 12/13/2012 03:30 AM    HDL Cholesterol 26 12/13/2012 03:30 AM    LDL, calculated 92.2 12/13/2012 03:30 AM    Triglyceride 169 (H) 12/13/2012 03:30 AM    CHOL/HDL Ratio 5.8 (H) 12/13/2012 03:30 AM     Lab Results   Component Value Date/Time    Glucose (POC) 150 (H) 03/19/2018 11:45 AM    Glucose (POC) 123 (H) 03/18/2018 08:55 PM    Glucose (POC) 96 03/16/2018 04:58 PM    Glucose (POC) 116 (H) 12/12/2012 12:42 PM    Glucose (POC) 99 12/11/2012 11:27 PM     Lab Results   Component Value Date/Time    Color YELLOW/STRAW 05/20/2019 05:52 AM    Appearance CLEAR 05/20/2019 05:52 AM    Specific gravity 1.011 05/20/2019 05:52 AM    pH (UA) 5.5 05/20/2019 05:52 AM    Protein NEGATIVE  05/20/2019 05:52 AM    Glucose NEGATIVE  05/20/2019 05:52 AM    Ketone NEGATIVE  05/20/2019 05:52 AM    Bilirubin NEGATIVE  05/20/2019 05:52 AM    Urobilinogen 0.2 05/20/2019 05:52 AM    Nitrites NEGATIVE  05/20/2019 05:52 AM    Leukocyte Esterase NEGATIVE  05/20/2019 05:52 AM    Epithelial cells FEW 05/20/2019 05:52 AM    Bacteria NEGATIVE  05/20/2019 05:52 AM    WBC 0-4 05/20/2019 05:52 AM    RBC 0-5 05/20/2019 05:52 AM         Medications Reviewed:     Current Facility-Administered Medications   Medication Dose Route Frequency    diphenhydrAMINE (BENADRYL) capsule 25 mg  25 mg Oral Q6H PRN    sodium chloride (NS) flush 5-40 mL  5-40 mL IntraVENous Q8H    sodium chloride (NS) flush 5-40 mL  5-40 mL IntraVENous PRN    acetaminophen (TYLENOL) tablet 650 mg  650 mg Oral Q6H PRN    Or    acetaminophen (TYLENOL) suppository 650 mg  650 mg Rectal Q6H PRN    polyethylene glycol (MIRALAX) packet 17 g  17 g Oral DAILY PRN    promethazine (PHENERGAN) tablet 12.5 mg  12.5 mg Oral Q6H PRN    Or    ondansetron (ZOFRAN) injection 4 mg  4 mg IntraVENous Q6H PRN    piperacillin-tazobactam (ZOSYN) 3.375 g in 0.9% sodium chloride (MBP/ADV) 100 mL MBP  3.375 g IntraVENous Q8H    colchicine tablet 0.6 mg  0.6 mg Oral DAILY PRN    gabapentin (NEURONTIN) capsule 100 mg  100 mg Oral TID    levothyroxine (SYNTHROID) tablet 112 mcg  112 mcg Oral ACB    lisinopriL (PRINIVIL, ZESTRIL) tablet 20 mg  20 mg Oral DAILY    melatonin tablet 3 mg  3 mg Oral QHS PRN    methadone (DOLOPHINE) tablet 140 mg  140 mg Oral DAILY    pantoprazole (PROTONIX) tablet 40 mg  40 mg Oral ACB&D    tamsulosin (FLOMAX) capsule 0.4 mg  0.4 mg Oral PCD    allopurinoL (ZYLOPRIM) tablet 100 mg  100 mg Oral DAILY    senna-docusate (PERICOLACE) 8.6-50 mg per tablet 1 Tab  1 Tab Oral DAILY     ______________________________________________________________________  EXPECTED LENGTH OF STAY: 3d 2h  ACTUAL LENGTH OF STAY:          Amanda 108, MD

## 2020-12-11 NOTE — PROGRESS NOTES
Transition of Care Plan   RUR- 20 % Medium  Risks    DISPOSITION: TBD pending therapy & care recommendation(s); pending medical progression    Transport: [address] of [address] [place] [address] or TBD       Reason for Admission:   Patient arrived via EMS from [facility] ER for subarachnoid hemorrhage after fall on 12/10/2020 morning; MD also concerned about ongoing wound to left foot (at ED)                   RUR Score: 20 % medium risks              PCP: First and Last name:  NO PCP; offered to set-up, declined    Name of Practice:    Are you a current patient: Yes/No:    Approximate date of last visit:    Can you participate in a virtual visit if needed:     Do you (patient/family) have any concerns for transition/discharge? YES, patient has no stable housing, lives with a friend, has multiple medical issues- needs assistance with ADLs and has not been able to walk recently since last 3 weeks, per patient. May need placement or DMEs like wheel-chair at discharge               Plan for utilizing home health:  TBD pending therapy recommendation; patient is currently open to [facility] for wound care       Current Advanced Directive/Advance Care Plan: on file; friend Shelli Mckinney [phone] (c) is the [facility]              Transition of Care Plan:  The disposition plan is TBD pending therapy & care recommendation(s). Reviewed chart for transitions of care,and discussed in rounds. CM met with patient at bedside to explain role and offer support. Patient is alert and oriented x4, and confirmed demographics. Patient lives at [address]. [facility]. It is a two-storied house with few steps to enter. He needs assistance with ADLs. He used to walk with a cane previously but has not been able to walk since last three weeks due to ongoing wound to left foot.      I offered to set-up new PCP appointment but he wants to wait until new year once his new insurance kicks-in. Mr. Boris Flynn said he has switched his insurance company effective Jan 1st, 2021. Transportation is to be determined at discharge. He has transportation benefit with his current insurance- [facility] [employer] of [address] [place] [address]. Care Management Interventions  PCP Verified by CM: Yes(NO PCP; offered to set-up, declined)  Palliative Care Criteria Met (RRAT>21 & CHF Dx)?: No  Mode of Transport at Discharge: Other (see comment)(TBD at discharge, may need  Christine Ryder )  Transition of Care Consult (CM Consult): Discharge Planning  MyChart Signup: Yes  Discharge Durable Medical Equipment: No(requesting a wheel-chair )  Health Maintenance Reviewed: Yes  Physical Therapy Consult: Yes  Occupational Therapy Consult: Yes  Speech Therapy Consult: Yes  Current Support Network:  Other(lives at friend's house )  Confirm Follow Up Transport: Other (see comment)(TBD at discharge )  The Patient and/or Patient Representative was Provided with a Choice of Provider and Agrees with the Discharge Plan?: Yes  Freedom of Choice List was Provided with Basic Dialogue that Supports the Patient's Individualized Plan of Care/Goals, Treatment Preferences and Shares the Quality Data Associated with the Providers?: Yes  [place] Resource Information Provided?: No  Discharge Location  Discharge Placement: Home(pending therapy/care recommendations )    JUVENCIO Pearson

## 2020-12-11 NOTE — PROGRESS NOTES
Bedside shift change report given to Aramis Arndt RN (oncoming nurse) by Aiyana Franco RN (offgoing nurse). Report included the following information SBAR, Kardex, Recent Results, Med Rec Status, Cardiac Rhythm nsr and Dual Neuro Assessment.

## 2020-12-11 NOTE — PROGRESS NOTES
Problem: Mobility Impaired (Adult and Pediatric)  Goal: *Acute Goals and Plan of Care (Insert Text)  Description: FUNCTIONAL STATUS PRIOR TO ADMISSION: Very poor historian. Reports that he was living at a hotel? And then home alone and staying on the 1st floor. Essentially unable to get up from the couch. Per chart, at last recent admission, a L BKA was recommended and he refused; was to be NWB but also refused/was unable to maintain that status    HOME SUPPORT PRIOR TO ADMISSION: Details unknown - very poor historian but sounds like he had very limited assistance     Physical Therapy Goals  Initiated 12/11/2020  1. Patient will move from supine to sit and sit to supine  and scoot up and down in bed with independence within 7 day(s). 2.  Patient will transfer from bed to chair and chair to bed with supervision/set-up using the least restrictive device within 7 day(s). 3.  Patient will perform lateral/scoot transfer to wheelchair vs NWB stand pivot to wheelchair with min A within 7 days. Outcome: Progressing Towards Goal    PHYSICAL THERAPY EVALUATION  Patient: Gatito Hunter (89 y.o. male)  Date: 12/11/2020  Primary Diagnosis: SAH (subarachnoid hemorrhage) ([facility]) [I60.9]  SAH [facility]achnoid hemorrhage) [facility] [I60.9]        Precautions: ? NWB L LE       ASSESSMENT  Based on the objective data described below, the patient presents with impaired functional mobility 2* significant L LE/foot pain, L baseline hemiparesis and flexion contractures, and inferred impaired standing balance following admission for GLF resulting in traumatic SAH. Repeat scans so resolution and no surgical interventions planned. Per extensive chart review, pt also has an extensive L foot wound and BKA as previously been recommended as well as NWB status. He is a poor historian but reports that he has been relatively immobile and having extensive difficulty with ADLs.     Received pt supine in bed - reporting 10/10 pain but agreeable to minimal participation. He was able to mobilize to EOB with SUP and demo good sitting balance however then declined attempts to stand (with assumed NWB on L LE) or laterally transfer to recliner. Questionable new peripheral vision difficulties. Remained sitting EOB at end of session. At this time he is below his baseline level and a high falls risk - recommend discharge to SNF. If this is not feasible, he will need wheelchair and possible hosptial bed as well as physical assist for all ADLs/mobility and a ramp for home entrance . Collette Ruts Current Level of Function Impacting Discharge (mobility/balance): SUP for bed mobility; refused to stand or transfer     Functional Outcome Measure: The patient scored 1/28 on the Tinetti outcome measure which is indicative of high falls risk. Other factors to consider for discharge: high falls risk     Patient will benefit from skilled therapy intervention to address the above noted impairments. PLAN :  Recommendations and Planned Interventions: bed mobility training, transfer training, therapeutic exercises, neuromuscular re-education, patient and family training/education, and therapeutic activities      Frequency/Duration: Patient will be followed by physical therapy:  5 times a week to address goals. Recommendation for discharge: (in order for the patient to meet his/her long term goals)  Therapy up to 5 days/week in SNF setting  If this is not feasible, he will need wheelchair and possible hosptial bed as well as physical assist for all ADLs/mobility and a ramp for home entrance    This discharge recommendation:  A follow-up discussion with the attending provider and/or case management is planned    IF patient discharges home will need the following DME: wheelchair         SUBJECTIVE:   Patient stated It hurts too much.     OBJECTIVE DATA SUMMARY:   HISTORY:    Past Medical History:   Diagnosis Date    Aneurysm ([facility]     (with stroke)    Bladder tumor [facility])     bladder CA    Chronic pain     Family history of bladder cancer     GERD (gastroesophageal reflux disease)     Gout     History of vascular access device 04/25/2019    [facility] VAT 4 FR MIDLINE R Cephalic Limited access    History of vascular access device 04/26/2019    4 fr Midline right Cephalic midline access    Hypercholesterolemia     Hypertension     Lesion of bladder     Seizures ([address]     Stroke ([facility] 2006    left sided weakness    Thromboembolus ([facility]     left leg    Thyroid disease     TIA (transient ischemic attack) 2012     Past Surgical History:   Procedure Laterality Date    HX ORTHOPAEDIC      R arthroscopy    HX OTHER SURGICAL      x2 L foot    HX UROLOGICAL  04/20/2018    Cystoscopy, TURBT (greater than 5 cm resected) Dr. Loulou Faulkner, [facility]. KNEE ARTHROSCP HARV      left knee    TRANSURETHRAL RESEC BLADDER NECK  08/10/2018       Personal factors and/or comorbidities impacting plan of care: PMHx    Home Situation  Home Environment: Private residence  # Steps to Enter: 5  Rails to Enter: Yes  One/Two Story Residence: Two story(has been staying on 1st floor)  Living Alone: No  Support Systems: Friends \ neighbors  Current DME Used/Available at Home: doe Henderson    EXAMINATION/PRESENTATION/DECISION MAKING:   Critical Behavior:  Neurologic State: Alert  Orientation Level: Oriented X4  Cognition: Follows commands  Safety/Judgement: Awareness of environment, Insight into deficits  Hearing:   Auditory  Auditory Impairment: None  Skin:    Edema:   Range Of Motion:  AROM: Grossly decreased, non-functional(L UE/LE)  PROM: Grossly decreased, non-functional(L UE/LE hypertonicity/contracture )        Strength:    Strength: Grossly decreased, non-functional(L UE/LE)       Tone & Sensation:     Sensation: Impaired(hypersensitive L side)    Coordination:     Vision:      Functional Mobility:  Bed Mobility:     Supine to Sit: Supervision     Scooting: Supervision  Transfers:  Sit to Stand: (refused to attempt)     Balance:   Sitting: Intact    Functional Measure:  Tinetti test:    Sitting Balance: 1  Arises: 0  Attempts to Rise: 0  Immediate Standing Balance: 0  Standing Balance: 0  Nudged: 0  Eyes Closed: 0  Turn 360 Degrees - Continuous/Discontinuous: 0  Turn 360 Degrees - Steady/Unsteady: 0  Sitting Down: 0  Balance Score: 1 Balance total score  Indication of Gait: 0  R Step Length/Height: 0  L Step Length/Height: 0  R Foot Clearance: 0  L Foot Clearance: 0  Step Symmetry: 0  Step Continuity: 0  Path: 0  Trunk: 0  Walking Time: 0  Gait Score: 0 Gait total score  Total Score: 1/28 Overall total score         Tinetti Tool Score Risk of Falls  <19 = High Fall Risk  19-24 = Moderate Fall Risk  25-28 = Low Fall Risk  Tinetti ME. Performance-Oriented Assessment of Mobility Problems in Elderly Patients. [address]ancini 66; [id number].  (Scoring Description: PT Bulletin Feb. 10, 1993)    Older adults: Taz Sandoval et al, 2009; n = 1000 [facility] elderly evaluated with ABC, ABBE, ADL, and IADL)  · Mean ABBE score for males aged 69-68 years = 26.21(3.40)  · Mean ABBE score for females age 69-68 years = 25.16(4.30)  · Mean ABBE score for males over 80 years = 23.29(6.02)  · Mean ABBE score for females over 80 years = 17.20(8.32)        Physical Therapy Evaluation Charge Determination   History Examination Presentation Decision-Making   HIGH Complexity :3+ comorbidities / personal factors will impact the outcome/ POC  MEDIUM Complexity : 3 Standardized tests and measures addressing body structure, function, activity limitation and / or participation in recreation  LOW Complexity : Stable, uncomplicated  HIGH Complexity : FOTO score of 1- 25       Based on the above components, the patient evaluation is determined to be of the following complexity level: LOW     Pain Rating:  10/10    Activity Tolerance:   Fair    After treatment patient left in no apparent distress:   Supine in bed, Call bell within reach, and Bed / chair alarm activated    COMMUNICATION/EDUCATION:   The patients plan of care was discussed with: Occupational therapist and Registered nurse. Fall prevention education was provided and the patient/caregiver indicated understanding., Patient/family have participated as able in goal setting and plan of care. , and Patient/family agree to work toward stated goals and plan of care.     Thank you for this referral.  Sherrell Wagoner, PT, DPT   Time Calculation: 27 mins

## 2020-12-11 NOTE — PROGRESS NOTES
Problem: Self Care Deficits Care Plan (Adult)  Goal: *Acute Goals and Plan of Care (Insert Text)  Description: FUNCTIONAL STATUS PRIOR TO ADMISSION: Very poor historian. Reports that he was living at a hotel? And then home alone and staying on the 1st floor with use of old w/c from a wound clinic? Essentially unable to get up from the couch. Per chart, at last recent admission, a L BKA was recommended and he refused; was to be NWB but also refused/was unable to maintain that status. HOME SUPPORT PRIOR TO ADMISSION: Details unknown - very poor historian but sounds like he had very limited assistance; per chart lives with friend/mPOA who provides assistance. Occupational Therapy Goals  Initiated 12/11/2020  1. Patient will perform grooming, seated EOB, with supervision/set-up using compensatory strategies PRN within 7 day(s). 2.  Patient will perform upper body dressing with supervision/set-up using noe technique within 7 day(s). 3.  Patient will perform lower body dressing with minimal assistance using AE/compensatory strategies PRN within 7 day(s). 4.  Patient will perform lateral t/f to [facility] with maximal assistance within 7 day(s). 5.  Patient will perform all aspects of toileting with minimal assistance/contact guard assist within 7 day(s). 6.  Patient will participate in upper extremity therapeutic exercise/activities with supervision/set-up for 5 minutes within 7 day(s). 7.  Patient will utilize energy conservation techniques during functional activities with verbal cues within 7 day(s).     Outcome: Progressing Towards Goal     OCCUPATIONAL THERAPY EVALUATION  Patient: Michelle Cartagena (62 y.o. male)  Date: 12/11/2020  Primary Diagnosis: SAH (subarachnoid hemorrhage) ([facility]) [I60.9]  SAH ([facility]arachnoid hemorrhage) ([facility] [I60.9]        Precautions: Fall; L LE wound       ASSESSMENT  Based on the objective data described below, the patient presents with decreased activity tolerance, L LE/foot pain, baseline L hemiparesis and flexion contractures in UE, and decreased ADL performance following admission for [facility] from [facility] at home. Pt also with chronic L LE wound and per chart review has been recommended for BKA in the past. No formal orders for WB at this time. This session, pt is received in bed agreeable to activity at EOB with therapy. He reports hypersensitivity in L side and is firm about not wanting assistance or for therapist to touch on L side. Once EOB, he demos good balance and allows OT to assess L UE, which is observed to be non functional due to extensive contractures. He is able to functionally reach entire UB with R UE but infer increased assistance with LB ADLs from seated position or bed level. During visual testing, unclear if pt has peripheral vision deficit bilaterally. Pt declines functional transfer OOB this session or attempts at standing. At this time, anticipate pt will require SNF-level rehab at discharge. Current Level of Function Impacting Discharge (ADLs/self-care): set up to min A UB ADLs; infer up to max A for LB ADLs; infer increased assistance for toilet/ADL transfers (pt refused to attempt this session)    Functional Outcome Measure: The patient scored Total: 30/100 on the Barthel Index outcome measure which is indicative of 70% impaired ability to care for basic self needs/dependency on others; inferred 75% dependency on others for instrumental ADLs. Other factors to consider for discharge: high fall risk; h/o L hemiparesis and flexion contractures; unclear baseline     Patient will benefit from skilled therapy intervention to address the above noted impairments.        PLAN :  Recommendations and Planned Interventions: self care training, functional mobility training, therapeutic exercise, balance training, therapeutic activities, endurance activities, patient education, home safety training and family training/education    Frequency/Duration: Patient will be followed by occupational therapy 4 times a week to address goals. Recommendation for discharge: (in order for the patient to meet his/her long term goals)  Therapy up to 5 days/week in SNF setting    This discharge recommendation:  Has been made in collaboration with the attending provider and/or case management    IF patient discharges home will need the following DME: wheelchair, drop arm BSC, hospital bed, ramp for home entrance, physical assistance for all ADLs/mobility       SUBJECTIVE:   Patient stated Don't touch my left side.     OBJECTIVE DATA SUMMARY:   HISTORY:   Past Medical History:   Diagnosis Date    Aneurysm ([address]     (with stroke)    Bladder tumor     Cancer ([address]     bladder CA    Chronic pain     Family history of bladder cancer     GERD (gastroesophageal reflux disease)     Gout     History of vascular access device 04/25/2019    [facility] VAT 4 FR MIDLINE R Cephalic Limited access    History of vascular access device 04/26/2019    4 fr Midline right Cephalic midline access    Hypercholesterolemia     Hypertension     Lesion of bladder     Seizures ([address]     Stroke ([address] 2006    left sided weakness    Thromboembolus ([address]     left leg    Thyroid disease     TIA (transient ischemic attack) 2012     Past Surgical History:   Procedure Laterality Date    HX ORTHOPAEDIC      R arthroscopy    HX OTHER SURGICAL      x2 L foot    HX UROLOGICAL  04/20/2018    Cystoscopy, TURBT (greater than 5 cm resected) Dr. Jg Mendez, [facility].     KNEE ARTHROSCP HARV      left knee    TRANSURETHRAL RESEC BLADDER NECK  08/10/2018       Expanded or extensive additional review of patient history:     Home Situation  Home Environment: Private residence  # Steps to Enter: 5  Rails to Enter: Yes  One/Two Story Residence: Two story(has been staying on 1st floor)  Living Alone: No  Support Systems: Friends \ neighbors  Current DME Used/Available at Home: Walker, rollator    Hand dominance: Right    EXAMINATION OF PERFORMANCE DEFICITS:  Cognitive/Behavioral Status:  Neurologic State: Alert  Orientation Level: Oriented X4  Cognition: Follows commands  Perception: Appears intact  Perseveration: No perseveration noted  Safety/Judgement: Awareness of environment; Insight into deficits     Hearing: Auditory  Auditory Impairment: None    Vision/Perceptual:    Tracking: Able to track stimulus in all quadrants w/o difficulty    Convergence: Breaks at 8 from nose    Visual Fields: Difficulty detecting stimulus  in left lateral quadrant; Difficulty detecting stimulus  in right lateral quadrant  Diplopia: No      Range of Motion:  AROM: Grossly decreased, non-functional(L UE/LE)  PROM: Grossly decreased, non-functional(L UE/LE hypertonicity/contracture )    Strength:  Strength: Grossly decreased, non-functional(L UE/LE)    Coordination:  Fine Motor Skills-Upper: Left Impaired;Right Intact    Gross Motor Skills-Upper: Left Impaired;Right Intact    Sensation:  Sensation: Impaired(hypersensitive L side)    Balance:  Sitting: Intact  Standing: (not assessed this session)    Functional Mobility and Transfers for ADLs:  Bed Mobility:  Supine to Sit: Supervision  Sit to Supine: (Pt seated at EOB at end of session)  Scooting: Supervision    Transfers:  Sit to Stand: (refused to attempt)    ADL Assessment:  Feeding: Setup;Minimum assistance(for bimanual tasks)    Oral Facial Hygiene/Grooming: Setup;Minimum assistance(for bimanual tasks)    Bathing: Minimum assistance; Moderate assistance(seated)    Upper Body Dressing: Minimum assistance(infer based on observations)    Lower Body Dressing: Moderate assistance;Maximum assistance    Toileting: Stand by assistance;Maximum assistance(infer assistance for bowel hygiene and clothing mgmt)    ADL Intervention and task modifications:    Cognitive Retraining  Safety/Judgement: Awareness of environment; Insight into deficits     Functional Measure:  Barthel Index:    Bathin  Bladder: 10  Bowels: 10  Groomin  Dressin  Feedin  Mobility: 0  Stairs: 0  Toilet Use: 0  Transfer (Bed to Chair and Back): 0  Total: 30/100        The Barthel ADL Index: Guidelines  1. The index should be used as a record of what a patient does, not as a record of what a patient could do. 2. The main aim is to establish degree of independence from any help, physical or verbal, however minor and for whatever reason. 3. The need for supervision renders the patient not independent. 4. A patient's performance should be established using the best available evidence. Asking the patient, friends/relatives and nurses are the usual sources, but direct observation and common sense are also important. However direct testing is not needed. 5. Usually the patient's performance over the preceding 24-48 hours is important, but occasionally longer periods will be relevant. 6. Middle categories imply that the patient supplies over 50 per cent of the effort. 7. Use of aids to be independent is allowed. Melo Gonzalez., Barthel, D.W. (1446). Functional evaluation: the Barthel Index. [facility] (14)2. Genna Roche balwinder ISAI Thompson, Lalita Matias, Eben Jose., Corrinne Rudder, [address] [place] [address] (). Measuring the change indisability after inpatient rehabilitation; comparison of the responsiveness of the Barthel Index and Functional Opdyke Measure. Journal of Neurology, Neurosurgery, and Psychiatry, 66(4), 901-583. Raman Leonardo, N.J.A, ELEANOR Hines, & Long Omalley M.A. (2004.) Assessment of post-stroke quality of life in cost-effectiveness studies: The usefulness of the Barthel Index and the EuroQoL-5D.  Quality of Life Research, 15, 522-47     Occupational Therapy Evaluation Charge Determination   History Examination Decision-Making   MEDIUM Complexity : Expanded review of history including physical, cognitive and psychosocial  history  HIGH Complexity : 5 or more performance deficits relating to physical, cognitive , or psychosocial skils that result in activity limitations and / or participation restrictions HIGH Complexity : Patient presents with comorbidities that affect occupational performance. Signifigant modification of tasks or assistance (eg, physical or verbal) with assessment (s) is necessary to enable patient to complete evaluation       Based on the above components, the patient evaluation is determined to be of the following complexity level: MEDIUM  Pain Rating:  Pt reporting L LE pain    Activity Tolerance:   Fair    After treatment patient left in no apparent distress:    Call bell within reach, Side rails x 3 and seated EOB; RN notified    COMMUNICATION/EDUCATION:   The patients plan of care was discussed with: Physical therapist and Registered nurse. Patient/family have participated as able in goal setting and plan of care. and Patient/family agree to work toward stated goals and plan of care. This patients plan of care is appropriate for delegation to [facility].     Thank you for this referral.  Cuco Rapp OT  Time Calculation: 27 mins

## 2020-12-11 NOTE — PROGRESS NOTES
Bedside shift change report given to EMERY Fulton (oncoming nurse) by Ignacio Ochoa RN (offgoing nurse). Report included the following information SBAR, Kardex, MAR, Accordion, Recent Results, Med Rec Status, Cardiac Rhythm Yessica Jones and Dual Neuro Assessment.

## 2020-12-11 NOTE — PROGRESS NOTES
RN told in report that pt has been declining wound care since admission. Pt's dressing is saturated with sanguinous fluid. Pt states he declined previously because the pain is intolerable. Received orders for one time dose of norco and plan to apply a new, clean dressing. Pt is agreeable    4:48pm: Administered norco and gave the patient time for it to kick in. After returning to the room for the dressing change, the pt states he only wants podiatry to touch the dressing.  Re-called podiatry consult, will continue to monitor

## 2020-12-11 NOTE — PROGRESS NOTES
Primary Nurse Alicia Brunson and Olamide Gonzalez RN performed a dual skin assessment on this patient Impairment noted- see wound doc flow sheet  Dejuan score is 17

## 2020-12-11 NOTE — PROGRESS NOTES
TRANSFER - OUT REPORT:    Verbal report given to EMERY Stout(name) on Ajit Orona  being transferred to  (unit) for routine progression of care       Report consisted of patients Situation, Background, Assessment and   Recommendations(SBAR). Information from the following report(s) SBAR, Kardex, MAR, Cardiac Rhythm SB and Dual Neuro Assessment was reviewed with the receiving nurse. Lines:   Peripheral IV 12/10/20 Right Forearm (Active)   Site Assessment Clean, dry, & intact 12/11/20 0800   Phlebitis Assessment 0 12/11/20 0800   Infiltration Assessment 0 12/11/20 0800   Dressing Status Clean, dry, & intact 12/11/20 0800   Dressing Type Tape;Transparent 12/11/20 0800   Hub Color/Line Status Blue; Infusing 12/11/20 0800   Action Taken Open ports on tubing capped 12/11/20 0800   Alcohol Cap Used Yes 12/11/20 0800        Opportunity for questions and clarification was provided.       Patient transported with:   Registered Nurse

## 2020-12-11 NOTE — PROGRESS NOTES
Problem: Falls - Risk of  Goal: *Absence of Falls  Description: Document Kiley Gerberveronica Fall Risk and appropriate interventions in the flowsheet. Outcome: Progressing Towards Goal  Note: Fall Risk Interventions:  Mobility Interventions: Bed/chair exit alarm, Patient to call before getting OOB         Medication Interventions: Patient to call before getting OOB, Bed/chair exit alarm    Elimination Interventions: Call light in reach    History of Falls Interventions: Door open when patient unattended         Problem: Patient Education: Go to Patient Education Activity  Goal: Patient/Family Education  Outcome: Progressing Towards Goal     Problem: Pressure Injury - Risk of  Goal: *Prevention of pressure injury  Description: Document Dejuan Scale and appropriate interventions in the flowsheet.   Outcome: Progressing Towards Goal  Note: Pressure Injury Interventions:  Sensory Interventions: Assess changes in LOC         Activity Interventions: PT/OT evaluation, Increase time out of bed    Mobility Interventions: HOB 30 degrees or less, PT/OT evaluation, Pressure redistribution bed/mattress (bed type)         Friction and Shear Interventions: Apply protective barrier, creams and emollients, HOB 30 degrees or less                Problem: Patient Education: Go to Patient Education Activity  Goal: Patient/Family Education  Outcome: Progressing Towards Goal

## 2020-12-11 NOTE — PROGRESS NOTES
Orders received, chart reviewed and patient evaluated by physical therapy. Pending progression with skilled acute physical therapy, recommend:  Therapy up to 5 days/week in SNF setting       Full evaluation to follow.      Robert Garza, PT, DPT

## 2020-12-12 LAB
ALBUMIN SERPL-MCNC: 2.8 G/DL (ref 3.5–5)
ALBUMIN/GLOB SERPL: 0.8 {RATIO} (ref 1.1–2.2)
ALP SERPL-CCNC: 152 U/L (ref 45–117)
ALT SERPL-CCNC: 12 U/L (ref 12–78)
ANION GAP SERPL CALC-SCNC: 5 MMOL/L (ref 5–15)
AST SERPL-CCNC: 8 U/L (ref 15–37)
BASOPHILS # BLD: 0.1 K/UL (ref 0–0.1)
BASOPHILS NFR BLD: 1 % (ref 0–1)
BILIRUB SERPL-MCNC: 0.3 MG/DL (ref 0.2–1)
BUN SERPL-MCNC: 9 MG/DL (ref 6–20)
BUN/CREAT SERPL: 13 (ref 12–20)
CALCIUM SERPL-MCNC: 8.4 MG/DL (ref 8.5–10.1)
CHLORIDE SERPL-SCNC: 108 MMOL/L (ref 97–108)
CO2 SERPL-SCNC: 28 MMOL/L (ref 21–32)
CREAT SERPL-MCNC: 0.67 MG/DL (ref 0.7–1.3)
DIFFERENTIAL METHOD BLD: ABNORMAL
EOSINOPHIL # BLD: 1 K/UL (ref 0–0.4)
EOSINOPHIL NFR BLD: 8 % (ref 0–7)
ERYTHROCYTE [DISTWIDTH] IN BLOOD BY AUTOMATED COUNT: 16.6 % (ref 11.5–14.5)
GLOBULIN SER CALC-MCNC: 3.6 G/DL (ref 2–4)
GLUCOSE SERPL-MCNC: 99 MG/DL (ref 65–100)
HCT VFR BLD AUTO: 32.6 % (ref 36.6–50.3)
HGB BLD-MCNC: 10.1 G/DL (ref 12.1–17)
IMM GRANULOCYTES # BLD AUTO: 0.1 K/UL (ref 0–0.04)
IMM GRANULOCYTES NFR BLD AUTO: 1 % (ref 0–0.5)
LYMPHOCYTES # BLD: 2.8 K/UL (ref 0.8–3.5)
LYMPHOCYTES NFR BLD: 25 % (ref 12–49)
MCH RBC QN AUTO: 25.8 PG (ref 26–34)
MCHC RBC AUTO-ENTMCNC: 31 G/DL (ref 30–36.5)
MCV RBC AUTO: 83.2 FL (ref 80–99)
MONOCYTES # BLD: 0.9 K/UL (ref 0–1)
MONOCYTES NFR BLD: 8 % (ref 5–13)
NEUTS SEG # BLD: 6.6 K/UL (ref 1.8–8)
NEUTS SEG NFR BLD: 57 % (ref 32–75)
NRBC # BLD: 0 K/UL (ref 0–0.01)
NRBC BLD-RTO: 0 PER 100 WBC
PLATELET # BLD AUTO: 311 K/UL (ref 150–400)
PMV BLD AUTO: 9.5 FL (ref 8.9–12.9)
POTASSIUM SERPL-SCNC: 4 MMOL/L (ref 3.5–5.1)
PROT SERPL-MCNC: 6.4 G/DL (ref 6.4–8.2)
RBC # BLD AUTO: 3.92 M/UL (ref 4.1–5.7)
SODIUM SERPL-SCNC: 141 MMOL/L (ref 136–145)
WBC # BLD AUTO: 11.4 K/UL (ref 4.1–11.1)

## 2020-12-12 PROCEDURE — 85025 COMPLETE CBC W/AUTO DIFF WBC: CPT

## 2020-12-12 PROCEDURE — 74011250637 HC RX REV CODE- 250/637: Performed by: INTERNAL MEDICINE

## 2020-12-12 PROCEDURE — 80053 COMPREHEN METABOLIC PANEL: CPT

## 2020-12-12 PROCEDURE — 99218 HC RM OBSERVATION: CPT

## 2020-12-12 PROCEDURE — 74011250636 HC RX REV CODE- 250/636: Performed by: INTERNAL MEDICINE

## 2020-12-12 PROCEDURE — 74011000258 HC RX REV CODE- 258: Performed by: INTERNAL MEDICINE

## 2020-12-12 PROCEDURE — 36415 COLL VENOUS BLD VENIPUNCTURE: CPT

## 2020-12-12 PROCEDURE — 96376 TX/PRO/DX INJ SAME DRUG ADON: CPT

## 2020-12-12 RX ORDER — HYDROCODONE BITARTRATE AND ACETAMINOPHEN 5; 325 MG/1; MG/1
1 TABLET ORAL
Status: DISCONTINUED | OUTPATIENT
Start: 2020-12-12 | End: 2020-12-17

## 2020-12-12 RX ADMIN — HYDROCODONE BITARTRATE AND ACETAMINOPHEN 1 TABLET: 5; 325 TABLET ORAL at 11:59

## 2020-12-12 RX ADMIN — Medication 3 MG: at 23:41

## 2020-12-12 RX ADMIN — LEVOTHYROXINE SODIUM 112 MCG: 0.11 TABLET ORAL at 07:36

## 2020-12-12 RX ADMIN — PIPERACILLIN AND TAZOBACTAM 3.38 G: 3; .375 INJECTION, POWDER, LYOPHILIZED, FOR SOLUTION INTRAVENOUS at 15:08

## 2020-12-12 RX ADMIN — ALLOPURINOL 100 MG: 100 TABLET ORAL at 09:32

## 2020-12-12 RX ADMIN — PIPERACILLIN AND TAZOBACTAM 3.38 G: 3; .375 INJECTION, POWDER, LYOPHILIZED, FOR SOLUTION INTRAVENOUS at 07:41

## 2020-12-12 RX ADMIN — ACETAMINOPHEN 650 MG: 325 TABLET ORAL at 20:15

## 2020-12-12 RX ADMIN — Medication 10 ML: at 23:47

## 2020-12-12 RX ADMIN — HYDROCODONE BITARTRATE AND ACETAMINOPHEN 1 TABLET: 5; 325 TABLET ORAL at 18:05

## 2020-12-12 RX ADMIN — TAMSULOSIN HYDROCHLORIDE 0.4 MG: 0.4 CAPSULE ORAL at 17:08

## 2020-12-12 RX ADMIN — GABAPENTIN 100 MG: 100 CAPSULE ORAL at 15:08

## 2020-12-12 RX ADMIN — PIPERACILLIN AND TAZOBACTAM 3.38 G: 3; .375 INJECTION, POWDER, LYOPHILIZED, FOR SOLUTION INTRAVENOUS at 23:43

## 2020-12-12 RX ADMIN — PANTOPRAZOLE SODIUM 40 MG: 40 TABLET, DELAYED RELEASE ORAL at 07:36

## 2020-12-12 RX ADMIN — PANTOPRAZOLE SODIUM 40 MG: 40 TABLET, DELAYED RELEASE ORAL at 15:08

## 2020-12-12 RX ADMIN — DIPHENHYDRAMINE HYDROCHLORIDE 25 MG: 25 CAPSULE ORAL at 11:12

## 2020-12-12 RX ADMIN — GABAPENTIN 100 MG: 100 CAPSULE ORAL at 23:41

## 2020-12-12 RX ADMIN — GABAPENTIN 100 MG: 100 CAPSULE ORAL at 09:32

## 2020-12-12 RX ADMIN — LISINOPRIL 20 MG: 20 TABLET ORAL at 09:32

## 2020-12-12 RX ADMIN — METHADONE HYDROCHLORIDE 140 MG: 10 TABLET ORAL at 09:32

## 2020-12-12 NOTE — PROGRESS NOTES
Hospitalist Progress Note  Subhash Hurley MD  Answering service: [phone] from in house phone        Date of Service:  2020  NAME:  Christy Jamison  :  1965  MRN:  [id number]      Admission Summary:      Christy Jamison is a 53 y.o. male who presents with      As per initial admission summary  HPI the patient is a transfer from [facility] for treatment/observation of a traumatic subarachnoid hemorrhage and also treatment of a left leg wound infection.  The patient fell last night around midnight, hitting his head. Argelia Vazquez has a laceration to the left parietal area that has been stapled.  The patient complains of a generalized headache which is unchanged since the fall.  He also has a chronic wound to his left leg/foot that appears to be secondarily infected.  The physician at [facility] spoke with the intensivist here and they accepted the patient in transfer.     On my HP. Patient with past medical history significant for chronic left foot ulcers since at least 2018 on chronic methadone and followed by behavioral health clinic history of stroke in  patient was transferred from [facility] as per documentation he does not want to go over there patient had a fall overnight and hit his head there is a laceration to the left parietal area that has been scattered patient complained of generalized headache and also concerned about chronic wound on the left foot patient was initially accepted to the ICU but repeat imaging here at [facility] did not show any bleeding patient does not report any fever, chest pain, shortness of breath or any other associated symptoms. He told me he lives by himself and one of his friend helps him with the dressing of the wound.   As per documentation patient was previously recommended amputation by the plastic surgery but he refused admitted to hospitalist service podiatry and infectious disease consulted       Interval history / Subjective:     Patient seen for Follow up of foot wound     Patient seen and examined by the bedside, Labs, images and notes reviewed  Patient complaining of foot pain. ID and podiatry consulted  podiatry recommending transfer to [facility]. Patient to discuss with POA     Assessment & Plan:     1. Traumatic sub arachnoid hamemorrhage, resolved   Repeat CT showed it has been resolved  Neurosurgery signed off  neuro checks for now     # chronic foot ulcer  Started on zosyn  Prior history of wound culture positive   Podiatry and ID consulted,   Wound care nurse  IV zosyn for now  Wound cultures ordered   Podiatry folllowing. No surgical intervention a this time. Patient to decide regarding transfer to [facility]      #chronic methadone use  Will continue home dose     # hypothyroidism  Continue synthroid     #HTN  Continue lisinopril       Code status: full code   DVT prophylaxis: scds    Care Plan discussed with: Patient/Family  Disposition: SNF/LTC and TBD     Hospital Problems  Date Reviewed: 9/9/2020          Codes Class Noted POA    Traumatic subarachnoid hemorrhage ([facility] ICD-10-CM: S20.0R6E  ICD-9-CM: 852.00  12/10/2020 Unknown        SAH (subarachnoid hemorrhage) ([facility] ICD-10-CM: I60.9  ICD-9-CM: 174  12/10/2020 Unknown                Review of Systems:   A comprehensive review of systems was negative except for that written in the HPI. Vital Signs:    Last 24hrs VS reviewed since prior progress note. Most recent are:  Visit Vitals  BP (!) 157/90   Pulse (!) 52   Temp 98.6 °F (37 °C)   Resp 16   Wt 107.9 kg (237 lb 14 oz)   SpO2 97%   BMI 31.38 kg/m²       No intake or output data in the 24 hours ending 12/12/20 1521     Physical Examination:   I had a face to face encounter with this patient and independently examined them on December 12, 2020 as outlined below:        General:  Alert, cooperative, no distress, appears stated age.    Head:  Normocephalic, staples left parietal area    Lungs:   Clear to auscultation bilaterally. Chest wall:  No tenderness or deformity. Heart:  Regular rate and rhythm, S1, S2 normal, no murmur, click, rub or gallop. Abdomen:   Soft, non-tender. Bowel sounds normal. No masses,  No organomegaly. Extremities: Chronic wound left foot , dressing    Pulses: 2+ and symmetric all extremities. Neurologic: CNII-XII intact. Normal strength, sensation and reflexes throughout. Data Review:    Review and/or order of clinical lab test  Review and/or order of tests in the radiology section of CPT  Review and/or order of tests in the medicine section of CPT      Labs:     Recent Labs     12/12/20  0730 12/11/20  0700   WBC 11.4* 12.7*   HGB 10.1* 10.3*   HCT 32.6* 34.0*    371     Recent Labs     12/12/20  0730 12/11/20  0700 12/10/20  1459    142 140   K 4.0 3.7 4.0    108 108   CO2 28 28 26   BUN 9 6 7   CREA 0.67* 0.68* 0.63*   GLU 99 90 91   CA 8.4* 8.6 8.1*     Recent Labs     12/12/20  0730 12/11/20  0700   ALT 12 12   * 171*   TBILI 0.3 0.3   TP 6.4 6.4   ALB 2.8* 3.0*   GLOB 3.6 3.4     No results for input(s): INR, PTP, APTT, INREXT, INREXT in the last 72 hours. No results for input(s): FE, TIBC, PSAT, FERR in the last 72 hours. Lab Results   Component Value Date/Time    Folate 8.6 12/13/2012 03:20 AM      No results for input(s): PH, PCO2, PO2 in the last 72 hours. No results for input(s): CPK, CKNDX, TROIQ in the last 72 hours.     No lab exists for component: CPKMB  Lab Results   Component Value Date/Time    Cholesterol, total 152 12/13/2012 03:30 AM    HDL Cholesterol 26 12/13/2012 03:30 AM    LDL, calculated 92.2 12/13/2012 03:30 AM    Triglyceride 169 (H) 12/13/2012 03:30 AM    CHOL/HDL Ratio 5.8 (H) 12/13/2012 03:30 AM     Lab Results   Component Value Date/Time    Glucose (POC) 150 (H) 03/19/2018 11:45 AM    Glucose (POC) 123 (H) 03/18/2018 08:55 PM    Glucose (POC) 96 03/16/2018 04:58 PM    Glucose (POC) 116 (H) 12/12/2012 12:42 PM    Glucose (POC) 99 12/11/2012 11:27 PM     Lab Results   Component Value Date/Time    Color YELLOW/STRAW 05/20/2019 05:52 AM    Appearance CLEAR 05/20/2019 05:52 AM    Specific gravity 1.011 05/20/2019 05:52 AM    pH (UA) 5.5 05/20/2019 05:52 AM    Protein NEGATIVE  05/20/2019 05:52 AM    Glucose NEGATIVE  05/20/2019 05:52 AM    Ketone NEGATIVE  05/20/2019 05:52 AM    Bilirubin NEGATIVE  05/20/2019 05:52 AM    Urobilinogen 0.2 05/20/2019 05:52 AM    Nitrites NEGATIVE  05/20/2019 05:52 AM    Leukocyte Esterase NEGATIVE  05/20/2019 05:52 AM    Epithelial cells FEW 05/20/2019 05:52 AM    Bacteria NEGATIVE  05/20/2019 05:52 AM    WBC 0-4 05/20/2019 05:52 AM    RBC 0-5 05/20/2019 05:52 AM         Medications Reviewed:     Current Facility-Administered Medications   Medication Dose Route Frequency    HYDROcodone-acetaminophen (NORCO) 5-325 mg per tablet 1 Tab  1 Tab Oral Q6H PRN    diphenhydrAMINE (BENADRYL) capsule 25 mg  25 mg Oral Q6H PRN    lidocaine (XYLOCAINE) 4 % cream   Topical PRN    sodium chloride (NS) flush 5-40 mL  5-40 mL IntraVENous Q8H    sodium chloride (NS) flush 5-40 mL  5-40 mL IntraVENous PRN    acetaminophen (TYLENOL) tablet 650 mg  650 mg Oral Q6H PRN    Or    acetaminophen (TYLENOL) suppository 650 mg  650 mg Rectal Q6H PRN    polyethylene glycol (MIRALAX) packet 17 g  17 g Oral DAILY PRN    promethazine (PHENERGAN) tablet 12.5 mg  12.5 mg Oral Q6H PRN    Or    ondansetron (ZOFRAN) injection 4 mg  4 mg IntraVENous Q6H PRN    piperacillin-tazobactam (ZOSYN) 3.375 g in 0.9% sodium chloride (MBP/ADV) 100 mL MBP  3.375 g IntraVENous Q8H    colchicine tablet 0.6 mg  0.6 mg Oral DAILY PRN    gabapentin (NEURONTIN) capsule 100 mg  100 mg Oral TID    levothyroxine (SYNTHROID) tablet 112 mcg  112 mcg Oral ACB    lisinopriL (PRINIVIL, ZESTRIL) tablet 20 mg  20 mg Oral DAILY    melatonin tablet 3 mg  3 mg Oral QHS PRN    methadone (DOLOPHINE) tablet 140 mg 140 mg Oral DAILY    pantoprazole (PROTONIX) tablet 40 mg  40 mg Oral ACB&D    tamsulosin (FLOMAX) capsule 0.4 mg  0.4 mg Oral PCD    allopurinoL (ZYLOPRIM) tablet 100 mg  100 mg Oral DAILY    senna-docusate (PERICOLACE) 8.6-50 mg per tablet 1 Tab  1 Tab Oral DAILY     ______________________________________________________________________  EXPECTED LENGTH OF STAY: 3d 2h  ACTUAL LENGTH OF STAY:          1                 Rudy Castro MD

## 2020-12-12 NOTE — CONSULTS
Foot and Ankle Surgery Consult    Subjective:      Shahla Erazo is a 53 y.o. male with pMHx of CVA 2006 with left sided paralysis, chronic methadone who presents for evaluation of left foot and ankle wounds. Pt states he has had wounds for 2 years. Foot wound started as puncture wound and grew larger. Pt reports ankle wound began from a post-op boot/shoe as a small pressure wound and also grew larger. Pt reports multiple previous debridements with synthetic skin grafts (embryonic) by 2 surgeons. Pt reports wounds previously seen by dermatologist 1 month ago who used clobetazole 0.05% topical and nonadherent with about 16 injections of steroids that have diminished the size of the wounds. Pt states he has received several recommendations for amputation. Previous diagnosis of Pyoderma gangrenosum. Pt was transferred from [facility] after sustaining fall with head lac at home. Per chart review, pt previously recommended amputation by plastic surgery and has refused. Pt relates pain and spasms to left leg. Pain to left leg expressed without palpation during examination. Pt denies F/C/N/V, SOB, or chest pain.      Past Medical History:   Diagnosis Date    Aneurysm [facility])     (with stroke)    Bladder tumor     Cancer ([address]     bladder CA    Chronic pain     Family history of bladder cancer     GERD (gastroesophageal reflux disease)     Gout     History of vascular access device 04/25/2019    [facility] VAT 4 FR MIDLINE R Cephalic Limited access    History of vascular access device 04/26/2019    4 fr Midline right Cephalic midline access    Hypercholesterolemia     Hypertension     Lesion of bladder     Seizures ([address]     Stroke ([address] 2006    left sided weakness    Thromboembolus ([address]     left leg    Thyroid disease     TIA (transient ischemic attack) 2012     Past Surgical History:   Procedure Laterality Date    HX ORTHOPAEDIC      R arthroscopy    HX OTHER SURGICAL      x2 L foot    HX UROLOGICAL 04/20/2018    Cystoscopy, TURBT (greater than 5 cm resected) Dr. Demetrio Pyle, [facility].  KNEE ARTHROSCP HARV      left knee    TRANSURETHRAL RESEC BLADDER NECK  08/10/2018      Family History   Problem Relation Age of Onset    Diabetes Father     Lung Disease Father     Diabetes Sister     Diabetes Brother     Cancer Paternal Grandfather         Bladder     Social History     Socioeconomic History    Marital status:      Spouse name: Not on file    Number of children: Not on file    Years of education: Not on file    Highest education level: Not on file   Social Needs    Financial resource strain: Not very hard    Food insecurity     Worry: Never true     Inability: Never true    Transportation needs     Medical: No     Non-medical: No   Tobacco Use    Smoking status: Current Every Day Smoker     Packs/day: 0.50    Smokeless tobacco: Never Used    Tobacco comment: instructed not to smoke 24 hrs prior surgery   Substance and Sexual Activity    Alcohol use: Yes     Alcohol/week: 6.0 standard drinks     Types: 6 Cans of beer per week     Comment: occasional    Drug use: Yes     Types: Prescription    Sexual activity: Yes   Lifestyle    Physical activity     Days per week: 7 days     Minutes per session: 30 min    Stress: Not at all   Relationships    Social connections     Talks on phone: Not on file     Gets together:  Three times a week     Attends Judaism service: Patient refused     Active member of club or organization: Patient refused     Attends meetings of clubs or organizations: Never     Relationship status:    Other Topics Concern     Service No    Blood Transfusions No     Comment: refused to make decision    Caffeine Concern No    Occupational Exposure No    Hobby Hazards No    Sleep Concern No    Stress Concern No    Weight Concern No    Special Diet No    Back Care No    Exercise No    Bike Helmet No    Seat Belt Yes    Self-Exams No   Social History Narrative    60year old  male admitted for reported SI in the context of bladder CA, chronic pain, homelessness, and opiod dependence.  Pt has been in multiple psychiatric admissions for the same compliants in the last 2 years,      Current Facility-Administered Medications   Medication Dose Route Frequency Provider Last Rate Last Dose    diphenhydrAMINE (BENADRYL) capsule 25 mg  25 mg Oral Q6H PRN Odalys Mcnamara MD   25 mg at 12/11/20 1615    lidocaine (XYLOCAINE) 4 % cream   Topical PRN Yassine Gentile, DPM        sodium chloride (NS) flush 5-40 mL  5-40 mL IntraVENous Q8H Odalys Mcnamara MD   10 mL at 12/11/20 1400    sodium chloride (NS) flush 5-40 mL  5-40 mL IntraVENous PRN Odalys Mcnamara MD        acetaminophen (TYLENOL) tablet 650 mg  650 mg Oral Q6H PRN Odalys Mcnamara MD   650 mg at 12/11/20 1539    Or    acetaminophen (TYLENOL) suppository 650 mg  650 mg Rectal Q6H PRN Odalys Mcnamara MD        polyethylene glycol (MIRALAX) packet 17 g  17 g Oral DAILY PRN Odalys Mcnamara MD        promethazine (PHENERGAN) tablet 12.5 mg  12.5 mg Oral Q6H PRN Odalys Mcnamara MD        Or    ondansetron (ZOFRAN) injection 4 mg  4 mg IntraVENous Q6H PRN Odalys Mcnamara MD        piperacillin-tazobactam (ZOSYN) 3.375 g in 0.9% sodium chloride (MBP/ADV) 100 mL MBP  3.375 g IntraVENous Q8H Odalys Mcnmaara MD 25 mL/hr at 12/11/20 1538 3.375 g at 12/11/20 1538    colchicine tablet 0.6 mg  0.6 mg Oral DAILY PRN Odalys Mcnamara MD   0.6 mg at 12/10/20 2232    gabapentin (NEURONTIN) capsule 100 mg  100 mg Oral TID Odalys Mcnamara MD   100 mg at 12/11/20 1539    levothyroxine (SYNTHROID) tablet 112 mcg  112 mcg Oral ACB Odalys Mcnamara MD   112 mcg at 12/11/20 0702    lisinopriL (PRINIVIL, ZESTRIL) tablet 20 mg  20 mg Oral DAILY Odalys Mcnamara MD   20 mg at 12/11/20 1036    melatonin tablet 3 mg  3 mg Oral QHS PRN Odalys Mcnamara MD        methadone (DOLOPHINE) tablet 140 mg  140 mg Oral DAILY Odalys Mcnamara MD   140 mg at 20 1036    pantoprazole (PROTONIX) tablet 40 mg  40 mg Oral ACB&D Yaritza Suh MD   40 mg at 20 1539    tamsulosin (FLOMAX) capsule 0.4 mg  0.4 mg Oral PCD Yaritza Suh MD   0.4 mg at 20 1749    allopurinoL (ZYLOPRIM) tablet 100 mg  100 mg Oral DAILY Yaritza Suh MD   100 mg at 20 1037    senna-docusate (PERICOLACE) 8.6-50 mg per tablet 1 Tab  1 Tab Oral DAILY Yaritza Suh MD            Allergies   Allergen Reactions    Sulfamethoxazole-Trimethoprim Rash     L eye and L hand    Ciprofloxacin Hives     Per pcp records    Codeine Hives     Tolerates dilaudid, oxycodone    Cymbalta [Duloxetine] Other (comments)     Confusion and memory loss    Lyrica [Pregabalin] Hives    Sulfa (Sulfonamide Antibiotics) Rash    Ultram [Tramadol] Hives    Vancomycin Shortness of Breath       Review of Systems:  Pertinent items are noted in the History of Present Illness. Objective:        Patient Vitals for the past 8 hrs:   BP Temp Pulse Resp SpO2   20 (!) 157/84 98.6 °F (37 °C) (!) 51 16 96 %   20 1525 (!) 152/85 98.2 °F (36.8 °C) 78 16 98 %       Temp (24hrs), Av.3 °F (36.8 °C), Min:98 °F (36.7 °C), Max:98.6 °F (37 °C)      Physical Exam:  GENERAL: alert, cooperative, no distress, appears stated age  Left lower extremity:  Left dorsum foot full thickness wounds with fibrotic and necrotic base, irregular borders, periwound erythema, edema  Left ankle almost circumeferential full thickness wound with fibrotic and necrotic base, irregular rolled borders, periwound erythema, edema. Wounds exquisitely tender to light palpation and removal of dressing. Unable to assess pulses 2/2 pain.                     Recent Results (from the past 24 hour(s))   METABOLIC PANEL, COMPREHENSIVE    Collection Time: 20  7:00 AM   Result Value Ref Range    Sodium 142 136 - 145 mmol/L    Potassium 3.7 3.5 - 5.1 mmol/L    Chloride 108 97 - 108 mmol/L    CO2 28 21 - 32 mmol/L    Anion gap 6 5 - 15 mmol/L    Glucose 90 65 - 100 mg/dL    BUN 6 6 - 20 MG/DL    Creatinine 0.68 (L) 0.70 - 1.30 MG/DL    BUN/Creatinine ratio 9 (L) 12 - 20      GFR est AA >60 >60 ml/min/1.73m2    GFR est non-AA >60 >60 ml/min/1.73m2    Calcium 8.6 8.5 - 10.1 MG/DL    Bilirubin, total 0.3 0.2 - 1.0 MG/DL    ALT (SGPT) 12 12 - 78 U/L    AST (SGOT) 9 (L) 15 - 37 U/L    Alk. phosphatase 171 (H) 45 - 117 U/L    Protein, total 6.4 6.4 - 8.2 g/dL    Albumin 3.0 (L) 3.5 - 5.0 g/dL    Globulin 3.4 2.0 - 4.0 g/dL    A-G Ratio 0.9 (L) 1.1 - 2.2     CBC WITH AUTOMATED DIFF    Collection Time: 12/11/20  7:00 AM   Result Value Ref Range    WBC 12.7 (H) 4.1 - 11.1 K/uL    RBC 4.06 (L) 4.10 - 5.70 M/uL    HGB 10.3 (L) 12.1 - 17.0 g/dL    HCT 34.0 (L) 36.6 - 50.3 %    MCV 83.7 80.0 - 99.0 FL    MCH 25.4 (L) 26.0 - 34.0 PG    MCHC 30.3 30.0 - 36.5 g/dL    RDW 16.9 (H) 11.5 - 14.5 %    PLATELET 798 353 - 390 K/uL    MPV 9.8 8.9 - 12.9 FL    NRBC 0.0 0  WBC    ABSOLUTE NRBC 0.00 0.00 - 0.01 K/uL    NEUTROPHILS 62 32 - 75 %    LYMPHOCYTES 24 12 - 49 %    MONOCYTES 7 5 - 13 %    EOSINOPHILS 6 0 - 7 %    BASOPHILS 1 0 - 1 %    IMMATURE GRANULOCYTES 0 0.0 - 0.5 %    ABS. NEUTROPHILS 7.9 1.8 - 8.0 K/UL    ABS. LYMPHOCYTES 3.0 0.8 - 3.5 K/UL    ABS. MONOCYTES 0.9 0.0 - 1.0 K/UL    ABS. EOSINOPHILS 0.7 (H) 0.0 - 0.4 K/UL    ABS. BASOPHILS 0.1 0.0 - 0.1 K/UL    ABS. IMM.  GRANS. 0.1 (H) 0.00 - 0.04 K/UL    DF AUTOMATED       Results     Procedure Component Value Units Date/Time    CULTURE, BLOOD, PAIRED [[id number]] Collected:  12/10/20 1651    Order Status:  Completed Specimen:  Blood Updated:  12/11/20 0553     Special Requests: NO SPECIAL REQUESTS        Culture result: NO GROWTH AFTER 12 HOURS       CULTURE, WOUND Chapito Holler STAIN [[id number]]     Order Status:  Sent Specimen:  Wound from Foot             Assessment:     Hospital Problems  Date Reviewed: 9/9/2020          Codes Class Noted POA    Traumatic subarachnoid hemorrhage ([facility] [address] ICD-10-CM: F02.8M0L  ICD-9-CM: 852.00  12/10/2020 Unknown        SAH (subarachnoid hemorrhage) ([facility] [address] ICD-10-CM: I60.9  ICD-9-CM: 610  12/10/2020 Unknown          4/25/2019 SURGICAL PATHOLOGY REPORT   Clinical History   Wound left foot; pyoderma gangrenosum versus neoplasm   FINAL PATHOLOGIC DIAGNOSIS   Skin, left foot, biopsy:   Ulcer and granulation tissue   No evidence of malignancy   See comment    Comment   Microscopic examination of the H&E slides shows epidermal necrosis with overlying crust containing numerous neutrophils. Underlying the ulcer is granulation tissue-like vascular proliferation and mild neutrophilic inflammation. Gram and Giemsa stains are negative for bacteria and parasites. [employer] and Norma stains are negative for mycobacteria. GMS and PAS stains are negative for fungal organisms and a Warthin's starry stain is negative for spirochetes. Negative stains do not entirely exclude an infectious etiology. Otherwise, the findings are not specific but could be seen in pyoderma gangrenosa. However, this is a diagnosis of exclusion and requires clinical and microbiologic correlation. *Electronically Signed Out By Carrie Obrien M.D.*   zme/5/1/2019          Plan:   54M with PMHx of chronic methadone use, CVA 2006 (left side affected), with 2 year history of left foot and ankle wounds likely pyoderma gangrenosum. Wounds do appear infected, on abx. Pt refusing amputation. Pt has endured multiple debridements, worsening and enlargement of wounds, and has a strong desire to save his limb. Pt would benefit from surgical debridement beginning with biopsy, however, without the multi-disciplinary approach to provide complex care, surgical intervention would only be recommended if limb was immediately threatened since debridement is likely to increase size of wounds. Previous surg path equivocal for pyoderma gangrenosum.  At this junction, I would strongly recommend a transfer to [facility] for multi-disciplinary complex care. This was discussed with patient who \"will think about it\". Plan: local wound care, will discuss pt's desire for possible transfer on 12/12/20. Continue abx. Will obtain superficial Cx at bedside 12/12/20.    Abx: zosyn  Dsg: lidocaine topical + adaptic + telfa + ABDs  WBS: WBAT   Signed By: Becca Wilks DPM     December 11, 2020

## 2020-12-12 NOTE — PROGRESS NOTES
Foot and Ankle Surgery Progress Note    Patient: Sav Spring MRN: [id number]  SSN: [id number]    YOB: 1965  Age: 53 y.o. Sex: male      Admit Date: 12/10/2020    * No surgery found *    Procedure:      Subjective:   Pt seen at bedside this AM.   Patient has no new complaints. Pt continues to relate pain and spasms to LLE. Pt denies F/C/N/V, pt asked that I speak with power of /medical Johny Mcguire who has helped oversee his care since he has developed wounds on his feet. Objective:     Visit Vitals  BP (!) 148/73 (BP 1 Location: Right arm, BP Patient Position: At rest)   Pulse (!) 52   Temp 97.9 °F (36.6 °C)   Resp 16   Wt 107.9 kg (237 lb 14 oz)   SpO2 100%   BMI 31.38 kg/m²       Temp (24hrs), Av.2 °F (36.8 °C), Min:97.9 °F (36.6 °C), Max:98.6 °F (37 °C)      Physical Exam:    GENERAL: alert, cooperative, no distress  LLE with dressing in place, c/d/i. Exquisitely tender to palpation.     Lab Review:   BMP:   Lab Results   Component Value Date/Time     2020 07:30 AM    K 4.0 2020 07:30 AM     2020 07:30 AM    CO2 28 2020 07:30 AM    AGAP 5 2020 07:30 AM    GLU 99 2020 07:30 AM    BUN 9 2020 07:30 AM    CREA 0.67 (L) 2020 07:30 AM    GFRAA >60 2020 07:30 AM    GFRNA >60 2020 07:30 AM     CBC:   Lab Results   Component Value Date/Time    WBC 11.4 (H) 2020 07:30 AM    HGB 10.1 (L) 2020 07:30 AM    HCT 32.6 (L) 2020 07:30 AM     2020 07:30 AM       Assessment:     Hospital Problems  Date Reviewed: 2020          Codes Class Noted POA    Traumatic subarachnoid hemorrhage ([address] ICD-10-CM: W16.7I7T  ICD-9-CM: 852.00  12/10/2020 Unknown        SAH (subarachnoid hemorrhage) ([facility] ICD-10-CM: I60.9  ICD-9-CM: 019  12/10/2020 Unknown              Plan/Recommendations/Medical Decision MakinM with PMHx of chronic methadone use, CVA  (left side affected), with 2 year history of left foot and ankle wounds likely pyoderma gangrenosum. Dressing not changed today due to pain. Will change 12/12/20. From yesterday's examination, pt would benefit from surgical debridement. However, the concern for enlargement of wounds post-dbt is high and likely not improving healing potential and benefit would be minimal. Wound base with patches of eschars and fibrotic drainage. No purulent drainage appreciated. Leukocytosis improving (11.4 today from 12.7 on 12/11/20 and 12.8 on 12/10/20). Pt and power  emphasized today that they do not want any debridement or amputation. Pt and power of (Venkata) relate OSH dermatologist recommended against further debridement and that current treatment has demonstrated wound improvement. Pt and Yisel Stephen also communicated frustrations about the multiple attempts by different providers at different hospitals over the last 2 years. Thus I have recommended [facility] for well-coordinated multispecialty care with complex patients. Discussed with patient and Yisel Stephen, that if they are happy with dermatologist, they should continue with her care as outpatient and continue with abx regimen per ID. I would be happy to provide contact information for [facility] in order to set up outpatient care. Pt would like to discuss with Venkata (power of /medical) regarding asking for a transfer to [facility] versus continuing with local dermatologist and trying to establish outpatient care with [facility]. Will follow up with patient on 12/13/20.     Plan: local wound care. No surgical intervention recommended at this time. Continue abx. Will obtain superficial Cx at bedside 12/13.    Abx: zosyn  Dsg: lidocaine topical + adaptic + telfa + ABDs  WBS: WBAT       Signed By: Alla Kehr, DPM     December 12, 2020   [phone]

## 2020-12-13 LAB
ALBUMIN SERPL-MCNC: 2.8 G/DL (ref 3.5–5)
ALBUMIN/GLOB SERPL: 0.8 {RATIO} (ref 1.1–2.2)
ALP SERPL-CCNC: 148 U/L (ref 45–117)
ALT SERPL-CCNC: 12 U/L (ref 12–78)
ANION GAP SERPL CALC-SCNC: 6 MMOL/L (ref 5–15)
AST SERPL-CCNC: 11 U/L (ref 15–37)
BASOPHILS # BLD: 0.1 K/UL (ref 0–0.1)
BASOPHILS NFR BLD: 1 % (ref 0–1)
BILIRUB SERPL-MCNC: 0.2 MG/DL (ref 0.2–1)
BUN SERPL-MCNC: 10 MG/DL (ref 6–20)
BUN/CREAT SERPL: 14 (ref 12–20)
CALCIUM SERPL-MCNC: 8.3 MG/DL (ref 8.5–10.1)
CHLORIDE SERPL-SCNC: 106 MMOL/L (ref 97–108)
CO2 SERPL-SCNC: 28 MMOL/L (ref 21–32)
CREAT SERPL-MCNC: 0.72 MG/DL (ref 0.7–1.3)
DIFFERENTIAL METHOD BLD: ABNORMAL
EOSINOPHIL # BLD: 0.9 K/UL (ref 0–0.4)
EOSINOPHIL NFR BLD: 8 % (ref 0–7)
ERYTHROCYTE [DISTWIDTH] IN BLOOD BY AUTOMATED COUNT: 16.5 % (ref 11.5–14.5)
GLOBULIN SER CALC-MCNC: 3.6 G/DL (ref 2–4)
GLUCOSE SERPL-MCNC: 101 MG/DL (ref 65–100)
HCT VFR BLD AUTO: 31.5 % (ref 36.6–50.3)
HGB BLD-MCNC: 9.6 G/DL (ref 12.1–17)
IMM GRANULOCYTES # BLD AUTO: 0.1 K/UL (ref 0–0.04)
IMM GRANULOCYTES NFR BLD AUTO: 1 % (ref 0–0.5)
LYMPHOCYTES # BLD: 3.1 K/UL (ref 0.8–3.5)
LYMPHOCYTES NFR BLD: 27 % (ref 12–49)
MCH RBC QN AUTO: 25.3 PG (ref 26–34)
MCHC RBC AUTO-ENTMCNC: 30.5 G/DL (ref 30–36.5)
MCV RBC AUTO: 83.1 FL (ref 80–99)
MONOCYTES # BLD: 0.8 K/UL (ref 0–1)
MONOCYTES NFR BLD: 7 % (ref 5–13)
NEUTS SEG # BLD: 6.6 K/UL (ref 1.8–8)
NEUTS SEG NFR BLD: 56 % (ref 32–75)
NRBC # BLD: 0 K/UL (ref 0–0.01)
NRBC BLD-RTO: 0 PER 100 WBC
PLATELET # BLD AUTO: 308 K/UL (ref 150–400)
PMV BLD AUTO: 9.4 FL (ref 8.9–12.9)
POTASSIUM SERPL-SCNC: 4.1 MMOL/L (ref 3.5–5.1)
PROT SERPL-MCNC: 6.4 G/DL (ref 6.4–8.2)
RBC # BLD AUTO: 3.79 M/UL (ref 4.1–5.7)
SODIUM SERPL-SCNC: 140 MMOL/L (ref 136–145)
WBC # BLD AUTO: 11.5 K/UL (ref 4.1–11.1)

## 2020-12-13 PROCEDURE — 74011250636 HC RX REV CODE- 250/636: Performed by: INTERNAL MEDICINE

## 2020-12-13 PROCEDURE — 96376 TX/PRO/DX INJ SAME DRUG ADON: CPT

## 2020-12-13 PROCEDURE — 74011250637 HC RX REV CODE- 250/637: Performed by: INTERNAL MEDICINE

## 2020-12-13 PROCEDURE — 99218 HC RM OBSERVATION: CPT

## 2020-12-13 PROCEDURE — 80053 COMPREHEN METABOLIC PANEL: CPT

## 2020-12-13 PROCEDURE — 85025 COMPLETE CBC W/AUTO DIFF WBC: CPT

## 2020-12-13 PROCEDURE — 74011000258 HC RX REV CODE- 258: Performed by: INTERNAL MEDICINE

## 2020-12-13 PROCEDURE — 36415 COLL VENOUS BLD VENIPUNCTURE: CPT

## 2020-12-13 RX ADMIN — HYDROCODONE BITARTRATE AND ACETAMINOPHEN 1 TABLET: 5; 325 TABLET ORAL at 20:23

## 2020-12-13 RX ADMIN — HYDROCODONE BITARTRATE AND ACETAMINOPHEN 1 TABLET: 5; 325 TABLET ORAL at 06:53

## 2020-12-13 RX ADMIN — LISINOPRIL 20 MG: 20 TABLET ORAL at 08:27

## 2020-12-13 RX ADMIN — GABAPENTIN 100 MG: 100 CAPSULE ORAL at 21:33

## 2020-12-13 RX ADMIN — ACETAMINOPHEN 650 MG: 325 TABLET ORAL at 15:45

## 2020-12-13 RX ADMIN — LEVOTHYROXINE SODIUM 112 MCG: 0.11 TABLET ORAL at 06:53

## 2020-12-13 RX ADMIN — Medication 5 ML: at 22:00

## 2020-12-13 RX ADMIN — HYDROCODONE BITARTRATE AND ACETAMINOPHEN 1 TABLET: 5; 325 TABLET ORAL at 00:26

## 2020-12-13 RX ADMIN — PIPERACILLIN AND TAZOBACTAM 3.38 G: 3; .375 INJECTION, POWDER, LYOPHILIZED, FOR SOLUTION INTRAVENOUS at 06:55

## 2020-12-13 RX ADMIN — TAMSULOSIN HYDROCHLORIDE 0.4 MG: 0.4 CAPSULE ORAL at 17:50

## 2020-12-13 RX ADMIN — PIPERACILLIN AND TAZOBACTAM 3.38 G: 3; .375 INJECTION, POWDER, LYOPHILIZED, FOR SOLUTION INTRAVENOUS at 14:30

## 2020-12-13 RX ADMIN — GABAPENTIN 100 MG: 100 CAPSULE ORAL at 08:27

## 2020-12-13 RX ADMIN — PANTOPRAZOLE SODIUM 40 MG: 40 TABLET, DELAYED RELEASE ORAL at 15:45

## 2020-12-13 RX ADMIN — HYDROCODONE BITARTRATE AND ACETAMINOPHEN 1 TABLET: 5; 325 TABLET ORAL at 14:11

## 2020-12-13 RX ADMIN — DOCUSATE SODIUM 50 MG AND SENNOSIDES 8.6 MG 1 TABLET: 8.6; 5 TABLET, FILM COATED ORAL at 08:27

## 2020-12-13 RX ADMIN — PANTOPRAZOLE SODIUM 40 MG: 40 TABLET, DELAYED RELEASE ORAL at 06:53

## 2020-12-13 RX ADMIN — GABAPENTIN 100 MG: 100 CAPSULE ORAL at 15:45

## 2020-12-13 RX ADMIN — ALLOPURINOL 100 MG: 100 TABLET ORAL at 08:27

## 2020-12-13 RX ADMIN — METHADONE HYDROCHLORIDE 140 MG: 10 TABLET ORAL at 08:27

## 2020-12-13 NOTE — PROGRESS NOTES
Hospitalist Progress Note  Mortimer Ober, MD  Answering service: [phone] from in house phone        Date of Service:  2020  NAME:  Patrick Shine  :  1965  MRN:  [id number]      Admission Summary:      Patrick Shine is a 53 y.o. male who presents with      As per initial admission summary  HPI the patient is a transfer from [facility] for treatment/observation of a traumatic subarachnoid hemorrhage and also treatment of a left leg wound infection.  The patient fell last night around midnight, hitting his head. Tiffany Lowe has a laceration to the left parietal area that has been stapled.  The patient complains of a generalized headache which is unchanged since the fall.  He also has a chronic wound to his left leg/foot that appears to be secondarily infected.  The physician at [facility] spoke with the intensivist here and they accepted the patient in transfer.     On my HP. Patient with past medical history significant for chronic left foot ulcers since at least 2018 on chronic methadone and followed by behavioral health clinic history of stroke in  patient was transferred from [facility] as per documentation he does not want to go over there patient had a fall overnight and hit his head there is a laceration to the left parietal area that has been scattered patient complained of generalized headache and also concerned about chronic wound on the left foot patient was initially accepted to the ICU but repeat imaging here at [facility] did not show any bleeding patient does not report any fever, chest pain, shortness of breath or any other associated symptoms. He told me he lives by himself and one of his friend helps him with the dressing of the wound.   As per documentation patient was previously recommended amputation by the plastic surgery but he refused admitted to hospitalist service podiatry and infectious disease consulted       Interval history / Subjective:     Patient seen for Follow up of foot wound     Patient seen and examined by the bedside, Labs, images and notes reviewed  Patient complaining of foot pain. ID and podiatry consulted  Patient not in agreement to go to [employer]. Want to continue care with dermatologist.   On IV  abx     Assessment & Plan:     1. Traumatic sub arachnoid hamemorrhage, resolved   Repeat CT showed it has been resolved  Neurosurgery signed off  neuro checks for now     # chronic foot ulcer  Started on zosyn  Prior history of wound culture positive   Podiatry and ID consulted,   Wound care nurse  IV zosyn for now  Wound cultures ordered   Podiatry folllowing. No surgical intervention a this time. Need to switch to oral abs on discharge  Cultures still pending      #chronic methadone use  Will continue home dose     # hypothyroidism  Continue synthroid     #HTN  Continue lisinopril       Code status: full code   DVT prophylaxis: [address] discussed with: Patient/Family  Disposition: SNF/LTC and TBD     Hospital Problems  Date Reviewed: 9/9/2020          Codes Class Noted POA    Traumatic subarachnoid hemorrhage ([facility] [address] ICD-10-CM: A17.6E5M  ICD-9-CM: 852.00  12/10/2020 Unknown        SAH (subarachnoid hemorrhage) ([facility] [address] ICD-10-CM: I60.9  ICD-9-CM: 225  12/10/2020 Unknown                Review of Systems:   A comprehensive review of systems was negative except for that written in the HPI. Vital Signs:    Last 24hrs VS reviewed since prior progress note.  Most recent are:  Visit Vitals  BP (!) 147/67 (BP 1 Location: Right arm, BP Patient Position: At rest)   Pulse (!) 52   Temp 98.6 °F (37 °C)   Resp 16   Wt 107.9 kg (237 lb 14 oz)   SpO2 96%   BMI 31.38 kg/m²         Intake/Output Summary (Last 24 hours) at 12/13/2020 1413  Last data filed at 12/12/2020 2029  Gross per 24 hour   Intake --   Output 450 ml   Net -450 ml        Physical Examination:   I had a face to face encounter with this patient and independently examined them on December 13, 2020 as outlined below:        General:  Alert, cooperative, no distress, appears stated age. Head:  Normocephalic, staples left parietal area    Lungs:   Clear to auscultation bilaterally. Chest wall:  No tenderness or deformity. Heart:  Regular rate and rhythm, S1, S2 normal, no murmur, click, rub or gallop. Abdomen:   Soft, non-tender. Bowel sounds normal. No masses,  No organomegaly. Extremities: Chronic wound left foot , dressing    Pulses: 2+ and symmetric all extremities. Neurologic: CNII-XII intact. Normal strength, sensation and reflexes throughout. Data Review:    Review and/or order of clinical lab test  Review and/or order of tests in the radiology section of CPT  Review and/or order of tests in the medicine section of CPT      Labs:     Recent Labs     12/13/20 0518 12/12/20  0730   WBC 11.5* 11.4*   HGB 9.6* 10.1*   HCT 31.5* 32.6*    311     Recent Labs     12/13/20 0518 12/12/20  0730 12/11/20  0700    141 142   K 4.1 4.0 3.7    108 108   CO2 28 28 28   BUN 10 9 6   CREA 0.72 0.67* 0.68*   * 99 90   CA 8.3* 8.4* 8.6     Recent Labs     12/13/20 0518 12/12/20  0730 12/11/20  0700   ALT 12 12 12   * 152* 171*   TBILI 0.2 0.3 0.3   TP 6.4 6.4 6.4   ALB 2.8* 2.8* 3.0*   GLOB 3.6 3.6 3.4     No results for input(s): INR, PTP, APTT, INREXT, INREXT in the last 72 hours. No results for input(s): FE, TIBC, PSAT, FERR in the last 72 hours. Lab Results   Component Value Date/Time    Folate 8.6 12/13/2012 03:20 AM      No results for input(s): PH, PCO2, PO2 in the last 72 hours. No results for input(s): CPK, CKNDX, TROIQ in the last 72 hours.     No lab exists for component: CPKMB  Lab Results   Component Value Date/Time    Cholesterol, total 152 12/13/2012 03:30 AM    HDL Cholesterol 26 12/13/2012 03:30 AM    LDL, calculated 92.2 12/13/2012 03:30 AM    Triglyceride 169 (H) 12/13/2012 03:30 AM CHOL/HDL Ratio 5.8 (H) 12/13/2012 03:30 AM     Lab Results   Component Value Date/Time    Glucose (POC) 150 (H) 03/19/2018 11:45 AM    Glucose (POC) 123 (H) 03/18/2018 08:55 PM    Glucose (POC) 96 03/16/2018 04:58 PM    Glucose (POC) 116 (H) 12/12/2012 12:42 PM    Glucose (POC) 99 12/11/2012 11:27 PM     Lab Results   Component Value Date/Time    Color YELLOW/STRAW 05/20/2019 05:52 AM    Appearance CLEAR 05/20/2019 05:52 AM    Specific gravity 1.011 05/20/2019 05:52 AM    pH (UA) 5.5 05/20/2019 05:52 AM    Protein NEGATIVE  05/20/2019 05:52 AM    Glucose NEGATIVE  05/20/2019 05:52 AM    Ketone NEGATIVE  05/20/2019 05:52 AM    Bilirubin NEGATIVE  05/20/2019 05:52 AM    Urobilinogen 0.2 05/20/2019 05:52 AM    Nitrites NEGATIVE  05/20/2019 05:52 AM    Leukocyte Esterase NEGATIVE  05/20/2019 05:52 AM    Epithelial cells FEW 05/20/2019 05:52 AM    Bacteria NEGATIVE  05/20/2019 05:52 AM    WBC 0-4 05/20/2019 05:52 AM    RBC 0-5 05/20/2019 05:52 AM         Medications Reviewed:     Current Facility-Administered Medications   Medication Dose Route Frequency    HYDROcodone-acetaminophen (NORCO) 5-325 mg per tablet 1 Tab  1 Tab Oral Q6H PRN    diphenhydrAMINE (BENADRYL) capsule 25 mg  25 mg Oral Q6H PRN    lidocaine (XYLOCAINE) 4 % cream   Topical PRN    sodium chloride (NS) flush 5-40 mL  5-40 mL IntraVENous Q8H    sodium chloride (NS) flush 5-40 mL  5-40 mL IntraVENous PRN    acetaminophen (TYLENOL) tablet 650 mg  650 mg Oral Q6H PRN    Or    acetaminophen (TYLENOL) suppository 650 mg  650 mg Rectal Q6H PRN    polyethylene glycol (MIRALAX) packet 17 g  17 g Oral DAILY PRN    promethazine (PHENERGAN) tablet 12.5 mg  12.5 mg Oral Q6H PRN    Or    ondansetron (ZOFRAN) injection 4 mg  4 mg IntraVENous Q6H PRN    piperacillin-tazobactam (ZOSYN) 3.375 g in 0.9% sodium chloride (MBP/ADV) 100 mL MBP  3.375 g IntraVENous Q8H    colchicine tablet 0.6 mg  0.6 mg Oral DAILY PRN    gabapentin (NEURONTIN) capsule 100 mg  100 mg Oral TID    levothyroxine (SYNTHROID) tablet 112 mcg  112 mcg Oral ACB    lisinopriL (PRINIVIL, ZESTRIL) tablet 20 mg  20 mg Oral DAILY    melatonin tablet 3 mg  3 mg Oral QHS PRN    methadone (DOLOPHINE) tablet 140 mg  140 mg Oral DAILY    pantoprazole (PROTONIX) tablet 40 mg  40 mg Oral ACB&D    tamsulosin (FLOMAX) capsule 0.4 mg  0.4 mg Oral PCD    allopurinoL (ZYLOPRIM) tablet 100 mg  100 mg Oral DAILY    senna-docusate (PERICOLACE) 8.6-50 mg per tablet 1 Tab  1 Tab Oral DAILY     ______________________________________________________________________  EXPECTED LENGTH OF STAY: 3d 2h  ACTUAL LENGTH OF STAY:          1                 Yashira Velázquez MD

## 2020-12-13 NOTE — PROGRESS NOTES
Foot and Ankle Surgery Progress Note    Patient: Johnathan Alvarez MRN: [id number]  SSN: [id number]    YOB: 1965  Age: 53 y.o. Sex: male      Admit Date: 12/10/2020    * No surgery found *    Procedure:      Subjective:   Pt seen at bedside. Patient has no new complaints. Pt reports malodor. Denies F/C/N/V, SOB. Objective:     Visit Vitals  BP (!) 147/67 (BP 1 Location: Right arm, BP Patient Position: At rest)   Pulse (!) 52   Temp 98.6 °F (37 °C)   Resp 16   Wt 107.9 kg (237 lb 14 oz)   SpO2 96%   BMI 31.38 kg/m²       Temp (24hrs), Av.4 °F (36.9 °C), Min:98 °F (36.7 °C), Max:98.6 °F (37 °C)      Physical Exam:    GENERAL: alert, cooperative, no distress  Left leg unable to extend knee  Left foot 0/5 muscle strength in all 4 quadrants  Dressing with aquagreen drainage to ankle wound that is 80% fibrotic base and 20% dry eschar with sloughing. Exquisitely tender. Rolled in borders, periwound erythema, malodor present. Dorsal foot wound 90% fibrotic base, 10% eschar, loosely adhered with sloughing. Exquisitely tender. Rolled in borders, periwound erythema, malodor present.      Lab Review:   BMP:   Lab Results   Component Value Date/Time     2020 05:18 AM    K 4.1 2020 05:18 AM     2020 05:18 AM    CO2 28 2020 05:18 AM    AGAP 6 2020 05:18 AM     (H) 2020 05:18 AM    BUN 10 2020 05:18 AM    CREA 0.72 2020 05:18 AM    GFRAA >60 2020 05:18 AM    GFRNA >60 2020 05:18 AM     CBC:   Lab Results   Component Value Date/Time    WBC 11.5 (H) 2020 05:18 AM    HGB 9.6 (L) 2020 05:18 AM    HCT 31.5 (L) 2020 05:18 AM     2020 05:18 AM       Assessment:     Hospital Problems  Date Reviewed: 2020          Codes Class Noted POA    Traumatic subarachnoid hemorrhage ([address] ICD-10-CM: E48.7F3Y  ICD-9-CM: 852.00  12/10/2020 Unknown        SAH (subarachnoid hemorrhage) ([address] ICD-10-CM: I60.9  ICD-9-CM: 430  12/10/2020 Unknown              Plan/Recommendations/Medical Decision MakinM with PMHx of chronic methadone use, CVA 2006 (left side affected), with 2 year history of left foot and ankle wounds likely pyoderma gangrenosum with mainly fibrotic base and drainage. Dressing changed today, . Aqua green drainage on dressing of ankle wound. Culture taken. Wound is likely infected, however there is likely an ongoing inflammation process due to pyoderma contributing to presentation. Since there is no active purulence, leukocytosis trending down (11.5<11.4<12.7), and patient refuses surgical debridement due to directions from dermatologist, surgical debridement would provide minimal benefit. Wounds are likely to enlarge due to debridement. Recommend local wound care. Previous discussion between patient and POA regarding transition of care to [facility]) vs continuing local wound care with already established dermatologist. Patient has decided to continue local wound care with dermatologist until he is able to get his personal affairs in order and then be evaluated as outpatient at [facility]. Pt declines any surgical intervention during this admission. Will include contact information for [facility] on discharge documentation.      Plan: local wound care. Continue abx. Wound care orders in place. Abx: zosyn  Cx: pending 20  Dsg: lidocaine topical + NS WTD + DSD.   WBS: WBAT   Signed By: Felicia Jonas DPM     2020

## 2020-12-14 PROBLEM — S06.6X0A TRAUMATIC SUBARACHNOID HEMORRHAGE WITHOUT LOSS OF CONSCIOUSNESS (HCC): Status: ACTIVE | Noted: 2020-12-10

## 2020-12-14 LAB
ALBUMIN SERPL-MCNC: 2.8 G/DL (ref 3.5–5)
ALBUMIN/GLOB SERPL: 0.8 {RATIO} (ref 1.1–2.2)
ALP SERPL-CCNC: 149 U/L (ref 45–117)
ALT SERPL-CCNC: 13 U/L (ref 12–78)
ANION GAP SERPL CALC-SCNC: 5 MMOL/L (ref 5–15)
AST SERPL-CCNC: 13 U/L (ref 15–37)
BASOPHILS # BLD: 0.1 K/UL (ref 0–0.1)
BASOPHILS NFR BLD: 1 % (ref 0–1)
BILIRUB SERPL-MCNC: 0.2 MG/DL (ref 0.2–1)
BUN SERPL-MCNC: 12 MG/DL (ref 6–20)
BUN/CREAT SERPL: 15 (ref 12–20)
CALCIUM SERPL-MCNC: 8.6 MG/DL (ref 8.5–10.1)
CHLORIDE SERPL-SCNC: 106 MMOL/L (ref 97–108)
CO2 SERPL-SCNC: 27 MMOL/L (ref 21–32)
CREAT SERPL-MCNC: 0.79 MG/DL (ref 0.7–1.3)
DIFFERENTIAL METHOD BLD: ABNORMAL
EOSINOPHIL # BLD: 0.9 K/UL (ref 0–0.4)
EOSINOPHIL NFR BLD: 8 % (ref 0–7)
ERYTHROCYTE [DISTWIDTH] IN BLOOD BY AUTOMATED COUNT: 16.2 % (ref 11.5–14.5)
GLOBULIN SER CALC-MCNC: 3.5 G/DL (ref 2–4)
GLUCOSE SERPL-MCNC: 111 MG/DL (ref 65–100)
HCT VFR BLD AUTO: 31.2 % (ref 36.6–50.3)
HGB BLD-MCNC: 9.5 G/DL (ref 12.1–17)
IMM GRANULOCYTES # BLD AUTO: 0.1 K/UL (ref 0–0.04)
IMM GRANULOCYTES NFR BLD AUTO: 1 % (ref 0–0.5)
LYMPHOCYTES # BLD: 3 K/UL (ref 0.8–3.5)
LYMPHOCYTES NFR BLD: 27 % (ref 12–49)
MCH RBC QN AUTO: 25.3 PG (ref 26–34)
MCHC RBC AUTO-ENTMCNC: 30.4 G/DL (ref 30–36.5)
MCV RBC AUTO: 83 FL (ref 80–99)
MONOCYTES # BLD: 0.9 K/UL (ref 0–1)
MONOCYTES NFR BLD: 8 % (ref 5–13)
NEUTS SEG # BLD: 6.1 K/UL (ref 1.8–8)
NEUTS SEG NFR BLD: 55 % (ref 32–75)
NRBC # BLD: 0 K/UL (ref 0–0.01)
NRBC BLD-RTO: 0 PER 100 WBC
PLATELET # BLD AUTO: 312 K/UL (ref 150–400)
PMV BLD AUTO: 9.3 FL (ref 8.9–12.9)
POTASSIUM SERPL-SCNC: 4.2 MMOL/L (ref 3.5–5.1)
PROT SERPL-MCNC: 6.3 G/DL (ref 6.4–8.2)
RBC # BLD AUTO: 3.76 M/UL (ref 4.1–5.7)
SODIUM SERPL-SCNC: 138 MMOL/L (ref 136–145)
WBC # BLD AUTO: 11.2 K/UL (ref 4.1–11.1)

## 2020-12-14 PROCEDURE — 99218 HC RM OBSERVATION: CPT

## 2020-12-14 PROCEDURE — 74011250636 HC RX REV CODE- 250/636: Performed by: INTERNAL MEDICINE

## 2020-12-14 PROCEDURE — 97530 THERAPEUTIC ACTIVITIES: CPT

## 2020-12-14 PROCEDURE — 74011000258 HC RX REV CODE- 258: Performed by: INTERNAL MEDICINE

## 2020-12-14 PROCEDURE — 85025 COMPLETE CBC W/AUTO DIFF WBC: CPT

## 2020-12-14 PROCEDURE — 80053 COMPREHEN METABOLIC PANEL: CPT

## 2020-12-14 PROCEDURE — 96376 TX/PRO/DX INJ SAME DRUG ADON: CPT

## 2020-12-14 PROCEDURE — 74011250637 HC RX REV CODE- 250/637: Performed by: INTERNAL MEDICINE

## 2020-12-14 PROCEDURE — 97530 THERAPEUTIC ACTIVITIES: CPT | Performed by: OCCUPATIONAL THERAPIST

## 2020-12-14 PROCEDURE — 36415 COLL VENOUS BLD VENIPUNCTURE: CPT

## 2020-12-14 PROCEDURE — 97535 SELF CARE MNGMENT TRAINING: CPT | Performed by: OCCUPATIONAL THERAPIST

## 2020-12-14 RX ADMIN — Medication 20 ML: at 23:04

## 2020-12-14 RX ADMIN — PIPERACILLIN AND TAZOBACTAM 3.38 G: 3; .375 INJECTION, POWDER, LYOPHILIZED, FOR SOLUTION INTRAVENOUS at 07:58

## 2020-12-14 RX ADMIN — HYDROCODONE BITARTRATE AND ACETAMINOPHEN 1 TABLET: 5; 325 TABLET ORAL at 16:46

## 2020-12-14 RX ADMIN — DOCUSATE SODIUM 50 MG AND SENNOSIDES 8.6 MG 1 TABLET: 8.6; 5 TABLET, FILM COATED ORAL at 10:11

## 2020-12-14 RX ADMIN — GABAPENTIN 100 MG: 100 CAPSULE ORAL at 23:03

## 2020-12-14 RX ADMIN — ACETAMINOPHEN 650 MG: 325 TABLET ORAL at 01:10

## 2020-12-14 RX ADMIN — ALLOPURINOL 100 MG: 100 TABLET ORAL at 10:11

## 2020-12-14 RX ADMIN — GABAPENTIN 100 MG: 100 CAPSULE ORAL at 10:11

## 2020-12-14 RX ADMIN — HYDROCODONE BITARTRATE AND ACETAMINOPHEN 1 TABLET: 5; 325 TABLET ORAL at 10:11

## 2020-12-14 RX ADMIN — PANTOPRAZOLE SODIUM 40 MG: 40 TABLET, DELAYED RELEASE ORAL at 07:58

## 2020-12-14 RX ADMIN — HYDROCODONE BITARTRATE AND ACETAMINOPHEN 1 TABLET: 5; 325 TABLET ORAL at 03:44

## 2020-12-14 RX ADMIN — ACETAMINOPHEN 650 MG: 325 TABLET ORAL at 13:10

## 2020-12-14 RX ADMIN — LISINOPRIL 20 MG: 20 TABLET ORAL at 10:12

## 2020-12-14 RX ADMIN — METHADONE HYDROCHLORIDE 140 MG: 10 TABLET ORAL at 10:12

## 2020-12-14 RX ADMIN — TAMSULOSIN HYDROCHLORIDE 0.4 MG: 0.4 CAPSULE ORAL at 18:53

## 2020-12-14 RX ADMIN — Medication 10 ML: at 08:00

## 2020-12-14 RX ADMIN — PIPERACILLIN AND TAZOBACTAM 3.38 G: 3; .375 INJECTION, POWDER, LYOPHILIZED, FOR SOLUTION INTRAVENOUS at 00:00

## 2020-12-14 RX ADMIN — HYDROCODONE BITARTRATE AND ACETAMINOPHEN 1 TABLET: 5; 325 TABLET ORAL at 23:03

## 2020-12-14 RX ADMIN — GABAPENTIN 100 MG: 100 CAPSULE ORAL at 15:33

## 2020-12-14 RX ADMIN — PANTOPRAZOLE SODIUM 40 MG: 40 TABLET, DELAYED RELEASE ORAL at 16:45

## 2020-12-14 RX ADMIN — PIPERACILLIN AND TAZOBACTAM 3.38 G: 3; .375 INJECTION, POWDER, LYOPHILIZED, FOR SOLUTION INTRAVENOUS at 23:56

## 2020-12-14 RX ADMIN — LEVOTHYROXINE SODIUM 112 MCG: 0.11 TABLET ORAL at 07:58

## 2020-12-14 RX ADMIN — PIPERACILLIN AND TAZOBACTAM 3.38 G: 3; .375 INJECTION, POWDER, LYOPHILIZED, FOR SOLUTION INTRAVENOUS at 15:32

## 2020-12-14 NOTE — WOUND CARE
WOCN Note:      Follow up visit. Assessed in 548. PPE: mask, goggles and gloves. Podiatry following.     Chart reviewed. Admitted DX:  SAH (subarachnoid hemorrhage)       Past Medical History:   Diagnosis Date    Aneurysm ([address]       (with stroke)    Bladder tumor      Cancer ([address]       bladder CA    Chronic pain      Family history of bladder cancer      GERD (gastroesophageal reflux disease)      Gout      History of vascular access device 04/25/2019     [facility] VAT 4 FR MIDLINE R Cephalic Limited access    History of vascular access device 04/26/2019     4 fr Midline right Cephalic midline access    Hypercholesterolemia      Hypertension      Lesion of bladder      Seizures ([address]      Stroke ([address] 2006     left sided weakness    Thromboembolus ([address]       left leg    Thyroid disease      TIA (transient ischemic attack) 2012      Assessment:   Patient is A&O x 3, communicative, continent and mobile independently in bed. Bed:  p500  Patient reports no pain. Right heel intact without erythema. Sacrum and buttocks intact without erythema per RN. Patient declined assessment.     1. POA Left lower extremity ulcer covered by dry dressing. Podiatry following. Wound care orders in place. 2.  Head laceration from fall prior to admit:  Approximated with staples. No drainage.     Wound, Pressure Prevention & Skin Care Recommendations:    1. Minimize layers of linen/pads under patient to optimize support surface. 2.  Turn/reposition approximately every 2 hours and offload heels. 3.  Manage moisture/ Keep skin folds clean and dry.   4.  Left lower extremity:  Wound care per podiatry.     Discussed above plan with patient and Giovanni Rodrigez RN.     Transition of Care: Plan to follow as needed.     AIXA Florentino RN [facility] Inpatient Wound [facility]  Available on Perfect Serve  Pager [phone]  Office [phone]

## 2020-12-14 NOTE — PROGRESS NOTES
Problem: Self Care Deficits Care Plan (Adult)  Goal: *Acute Goals and Plan of Care (Insert Text)  Description: FUNCTIONAL STATUS PRIOR TO ADMISSION: Very poor historian. Reports that he was living at a hotel? And then home alone and staying on the 1st floor with use of old w/c from a wound clinic? Essentially unable to get up from the couch. Per chart, at last recent admission, a L BKA was recommended and he refused; was to be NWB but also refused/was unable to maintain that status. HOME SUPPORT PRIOR TO ADMISSION: Details unknown - very poor historian but sounds like he had very limited assistance; per chart lives with friend/mPOA who provides assistance. Occupational Therapy Goals  Initiated 12/11/2020  1. Patient will perform grooming, seated EOB, with supervision/set-up using compensatory strategies PRN within 7 day(s). 2.  Patient will perform upper body dressing with supervision/set-up using noe technique within 7 day(s). 3.  Patient will perform lower body dressing with minimal assistance using AE/compensatory strategies PRN within 7 day(s). 4.  Patient will perform lateral t/f to [facility] with maximal assistance within 7 day(s). 5.  Patient will perform all aspects of toileting with minimal assistance/contact guard assist within 7 day(s). 6.  Patient will participate in upper extremity therapeutic exercise/activities with supervision/set-up for 5 minutes within 7 day(s). 7.  Patient will utilize energy conservation techniques during functional activities with verbal cues within 7 day(s). Outcome: Progressing Towards Goal    OCCUPATIONAL THERAPY TREATMENT  Patient: Heydi Bright (89 y.o. male)  Date: 12/14/2020  Diagnosis: SAH (subarachnoid hemorrhage) ([facility]) [I60.9]  [facility] [I60.9]   <principal problem not specified>       Precautions:    Chart, occupational therapy assessment, plan of care, and goals were reviewed.     ASSESSMENT  Patient continues with skilled OT services and is progressing towards goals. Patient limited by pain in left LE and request therapy come in a.m.'s after pain meds given ~0900 if possible. Patient reported getting to chair earlier this date and on bedside commode, at this time was unable to safely stand due to decreased strength, balance, posterior lean and decreased adherence to instruction during transfer in effort to increase safety. Performance fluctuates based on pain. Recommend use of drop arm bedside commode for lateral transfer to increase safety and informed RN. Recommend attempt to perform lateral transfer next visit. Current Level of Function Impacting Discharge (ADLs): unable to transfer to chair or bedside commode, able to don right shoe with CGA    Other factors to consider for discharge:          PLAN :  Patient continues to benefit from skilled intervention to address the above impairments. Continue treatment per established plan of care. to address goals. Recommend with staff: assist of 2 for all transfers, performance fluctuates and may need to perform toileting bed level or with drop arm commode    Recommend next OT session: lateral transfer to chair or drop arm bedside commode    Recommendation for discharge: (in order for the patient to meet his/her long term goals)  Therapy up to 5 days/week in SNF setting    This discharge recommendation:  Has been made in collaboration with the attending provider and/or case management    IF patient discharges home will need the following DME:        SUBJECTIVE:   Patient stated I did it earlier, I just can't right now, I'm in too much pain.     OBJECTIVE DATA SUMMARY:   Cognitive/Behavioral Status:   Alert and oriented, decreased command following during functional transfers and decreased safety awareness      Functional Mobility and Transfers for ADLs:  Bed Mobility:  Sit to Supine: Stand-by assistance  Scooting: Stand-by assistance    Transfers:  Sit to Stand:  Other (comment)(unable to achieve full upright with assist of 2 due to pain )  Functional Transfers  Toilet Transfer : Other (comment)(recommend lateral transfer to drop arm commode)  Bed to Chair: Other (comment)(deferred due to pain and decreased ability to follow command)    Balance:  Sitting: Intact  Standing: (unable)    ADL Intervention:     Educated on benefit of increased activity, educated on positioning in supine with right foot elevated however declined    Instructed on safety and fall prevention related to sit to stand, instructed to bring right foot back prior to standing, patient requesting bed height elevated, however with bed significantly elevated and assist of 2 demonstrated decreased balance, posterior lean, and unable to safely achieve full upright. Patient provided cues to attempt to make transfer safer however without adherence to instruction making transfer even more unsafe. Lower Body Dressing Assistance  Dressing Assistance: Moderate assistance  Shoes with Velcro: Compensatory technique training(cast shoe with velcro)  Shoes with Cloth Laces: Contact guard assistance(don right shoe without tying/untying laces)  Leg Crossed Method Used: No  Position Performed: Seated edge of bed    Toileting  Bladder Hygiene: (using urinal)       Pain:  Not rated, asked if could come back after pain meds however next meds not due for ~2hours. Agreeable to try to sit edge of bed to get in chair    Activity Tolerance:   Poor and limited by pain    After treatment patient left in no apparent distress:   Supine in bed, Call bell within reach, and declined to elevate left LE , bed alarm activated    COMMUNICATION/COLLABORATION:   The patients plan of care was discussed with: Physical therapist and Registered nurse.      Becca Patel OTR/L  Time Calculation: 26 mins

## 2020-12-14 NOTE — PROGRESS NOTES
Hospitalist Progress Note  Subhash Hurley MD  Answering service: [phone] from in house phone        Date of Service:  2020  NAME:  Christy Jamison  :  1965  MRN:  [id number]      Admission Summary:      Christy Jamison is a 53 y.o. male who presents with      As per initial admission summary  HPI the patient is a transfer from [facility] for treatment/observation of a traumatic subarachnoid hemorrhage and also treatment of a left leg wound infection.  The patient fell last night around midnight, hitting his head. Argelia Vazquez has a laceration to the left parietal area that has been stapled.  The patient complains of a generalized headache which is unchanged since the fall.  He also has a chronic wound to his left leg/foot that appears to be secondarily infected.  The physician at [facility] spoke with the intensivist here and they accepted the patient in transfer.     On my HP. Patient with past medical history significant for chronic left foot ulcers since at least 2018 on chronic methadone and followed by behavioral health clinic history of stroke in  patient was transferred from [facility] as per documentation he does not want to go over there patient had a fall overnight and hit his head there is a laceration to the left parietal area that has been scattered patient complained of generalized headache and also concerned about chronic wound on the left foot patient was initially accepted to the ICU but repeat imaging here at [facility] did not show any bleeding patient does not report any fever, chest pain, shortness of breath or any other associated symptoms. He told me he lives by himself and one of his friend helps him with the dressing of the wound.   As per documentation patient was previously recommended amputation by the plastic surgery but he refused admitted to hospitalist service podiatry and infectious disease consulted       Interval history / Subjective:     Patient seen for Follow up of foot wound     Patient seen and examined by the bedside, Labs, images and notes reviewed  Patient complaining of foot pain. ID and podiatry consulted  Today patient told me that he wants to disucss with podiatry regarding going to [facility]. Still on IV zosyn   Podiatry following. Leucocytosis slowly improving      Assessment & Plan:     1. Traumatic sub arachnoid hamemorrhage, resolved   Repeat CT showed it has been resolved  Neurosurgery signed off  neuro checks for now     # chronic foot ulcer  Started on zosyn  Prior history of wound culture positive   Podiatry and ID consulted,   Wound care nurse  IV zosyn for now  Wound cultures ordered   Podiatry folllowing. No surgical intervention a this time. Need to switch to oral abs on discharge  Cultures still pending      #chronic methadone use  Will continue home dose     # hypothyroidism  Continue synthroid     #HTN  Continue lisinopril       Code status: full code   DVT prophylaxis: [address] discussed with: Patient/Family  Disposition: SNF/LTC and TBD     Hospital Problems  Date Reviewed: 9/9/2020          Codes Class Noted POA    Traumatic subarachnoid hemorrhage ([facility] [address] ICD-10-CM: T57.2U3H  ICD-9-CM: 852.00  12/10/2020 Unknown        Traumatic subarachnoid hemorrhage without loss of consciousness ([address] ICD-10-CM: F77.3C9C  ICD-9-CM: 852.01  12/10/2020 Yes                Review of Systems:   A comprehensive review of systems was negative except for that written in the HPI. Vital Signs:    Last 24hrs VS reviewed since prior progress note.  Most recent are:  Visit Vitals  BP (!) 149/94   Pulse (!) 51   Temp 98.6 °F (37 °C)   Resp 16   Wt 107.9 kg (237 lb 14 oz)   SpO2 96%   BMI 31.38 kg/m²         Intake/Output Summary (Last 24 hours) at 12/14/2020 1512  Last data filed at 12/14/2020 0816  Gross per 24 hour   Intake 300 ml   Output 750 ml   Net -450 ml        Physical Examination:   I had a face to face encounter with this patient and independently examined them on December 14, 2020 as outlined below:        General:  Alert, cooperative, no distress, appears stated age. Head:  Normocephalic, staples left parietal area    Lungs:   Clear to auscultation bilaterally. Chest wall:  No tenderness or deformity. Heart:  Regular rate and rhythm, S1, S2 normal, no murmur, click, rub or gallop. Abdomen:   Soft, non-tender. Bowel sounds normal. No masses,  No organomegaly. Extremities: Chronic wound left foot , dressing    Pulses: 2+ and symmetric all extremities. Neurologic: CNII-XII intact. Normal strength, sensation and reflexes throughout. Data Review:    Review and/or order of clinical lab test  Review and/or order of tests in the radiology section of CPT  Review and/or order of tests in the medicine section of CPT      Labs:     Recent Labs     12/14/20 0449 12/13/20  0518   WBC 11.2* 11.5*   HGB 9.5* 9.6*   HCT 31.2* 31.5*    308     Recent Labs     12/14/20 0449 12/13/20 0518 12/12/20  0730    140 141   K 4.2 4.1 4.0    106 108   CO2 27 28 28   BUN 12 10 9   CREA 0.79 0.72 0.67*   * 101* 99   CA 8.6 8.3* 8.4*     Recent Labs     12/14/20 0449 12/13/20 0518 12/12/20  0730   ALT 13 12 12   * 148* 152*   TBILI 0.2 0.2 0.3   TP 6.3* 6.4 6.4   ALB 2.8* 2.8* 2.8*   GLOB 3.5 3.6 3.6     No results for input(s): INR, PTP, APTT, INREXT, INREXT in the last 72 hours. No results for input(s): FE, TIBC, PSAT, FERR in the last 72 hours. Lab Results   Component Value Date/Time    Folate 8.6 12/13/2012 03:20 AM      No results for input(s): PH, PCO2, PO2 in the last 72 hours. No results for input(s): CPK, CKNDX, TROIQ in the last 72 hours.     No lab exists for component: CPKMB  Lab Results   Component Value Date/Time    Cholesterol, total 152 12/13/2012 03:30 AM    HDL Cholesterol 26 12/13/2012 03:30 AM    LDL, calculated 92.2 12/13/2012 03:30 AM Triglyceride 169 (H) 12/13/2012 03:30 AM    CHOL/HDL Ratio 5.8 (H) 12/13/2012 03:30 AM     Lab Results   Component Value Date/Time    Glucose (POC) 150 (H) 03/19/2018 11:45 AM    Glucose (POC) 123 (H) 03/18/2018 08:55 PM    Glucose (POC) 96 03/16/2018 04:58 PM    Glucose (POC) 116 (H) 12/12/2012 12:42 PM    Glucose (POC) 99 12/11/2012 11:27 PM     Lab Results   Component Value Date/Time    Color YELLOW/STRAW 05/20/2019 05:52 AM    Appearance CLEAR 05/20/2019 05:52 AM    Specific gravity 1.011 05/20/2019 05:52 AM    pH (UA) 5.5 05/20/2019 05:52 AM    Protein NEGATIVE  05/20/2019 05:52 AM    Glucose NEGATIVE  05/20/2019 05:52 AM    Ketone NEGATIVE  05/20/2019 05:52 AM    Bilirubin NEGATIVE  05/20/2019 05:52 AM    Urobilinogen 0.2 05/20/2019 05:52 AM    Nitrites NEGATIVE  05/20/2019 05:52 AM    Leukocyte Esterase NEGATIVE  05/20/2019 05:52 AM    Epithelial cells FEW 05/20/2019 05:52 AM    Bacteria NEGATIVE  05/20/2019 05:52 AM    WBC 0-4 05/20/2019 05:52 AM    RBC 0-5 05/20/2019 05:52 AM         Medications Reviewed:     Current Facility-Administered Medications   Medication Dose Route Frequency    HYDROcodone-acetaminophen (NORCO) 5-325 mg per tablet 1 Tab  1 Tab Oral Q6H PRN    diphenhydrAMINE (BENADRYL) capsule 25 mg  25 mg Oral Q6H PRN    lidocaine (XYLOCAINE) 4 % cream   Topical PRN    sodium chloride (NS) flush 5-40 mL  5-40 mL IntraVENous Q8H    sodium chloride (NS) flush 5-40 mL  5-40 mL IntraVENous PRN    acetaminophen (TYLENOL) tablet 650 mg  650 mg Oral Q6H PRN    Or    acetaminophen (TYLENOL) suppository 650 mg  650 mg Rectal Q6H PRN    polyethylene glycol (MIRALAX) packet 17 g  17 g Oral DAILY PRN    promethazine (PHENERGAN) tablet 12.5 mg  12.5 mg Oral Q6H PRN    Or    ondansetron (ZOFRAN) injection 4 mg  4 mg IntraVENous Q6H PRN    piperacillin-tazobactam (ZOSYN) 3.375 g in 0.9% sodium chloride (MBP/ADV) 100 mL MBP  3.375 g IntraVENous Q8H    colchicine tablet 0.6 mg  0.6 mg Oral DAILY PRN  gabapentin (NEURONTIN) capsule 100 mg  100 mg Oral TID    levothyroxine (SYNTHROID) tablet 112 mcg  112 mcg Oral ACB    lisinopriL (PRINIVIL, ZESTRIL) tablet 20 mg  20 mg Oral DAILY    melatonin tablet 3 mg  3 mg Oral QHS PRN    methadone (DOLOPHINE) tablet 140 mg  140 mg Oral DAILY    pantoprazole (PROTONIX) tablet 40 mg  40 mg Oral ACB&D    tamsulosin (FLOMAX) capsule 0.4 mg  0.4 mg Oral PCD    allopurinoL (ZYLOPRIM) tablet 100 mg  100 mg Oral DAILY    senna-docusate (PERICOLACE) 8.6-50 mg per tablet 1 Tab  1 Tab Oral DAILY     ______________________________________________________________________  EXPECTED LENGTH OF STAY: 3d 2h  ACTUAL LENGTH OF STAY:          1                 Larissa Gross MD

## 2020-12-14 NOTE — PROGRESS NOTES
Bedside shift change report given to Suraj Virk (oncoming nurse) by Naty Bryant (offgoing nurse). Report included the following information SBAR, Kardex and MAR.

## 2020-12-15 LAB
ALBUMIN SERPL-MCNC: 2.9 G/DL (ref 3.5–5)
ALBUMIN/GLOB SERPL: 0.8 {RATIO} (ref 1.1–2.2)
ALP SERPL-CCNC: 146 U/L (ref 45–117)
ALT SERPL-CCNC: 14 U/L (ref 12–78)
ANION GAP SERPL CALC-SCNC: 6 MMOL/L (ref 5–15)
AST SERPL-CCNC: 17 U/L (ref 15–37)
BACTERIA SPEC CULT: NORMAL
BASOPHILS # BLD: 0.1 K/UL (ref 0–0.1)
BASOPHILS NFR BLD: 1 % (ref 0–1)
BILIRUB SERPL-MCNC: 0.2 MG/DL (ref 0.2–1)
BUN SERPL-MCNC: 12 MG/DL (ref 6–20)
BUN/CREAT SERPL: 16 (ref 12–20)
CALCIUM SERPL-MCNC: 8.3 MG/DL (ref 8.5–10.1)
CHLORIDE SERPL-SCNC: 105 MMOL/L (ref 97–108)
CO2 SERPL-SCNC: 27 MMOL/L (ref 21–32)
CREAT SERPL-MCNC: 0.75 MG/DL (ref 0.7–1.3)
DIFFERENTIAL METHOD BLD: ABNORMAL
EOSINOPHIL # BLD: 0.9 K/UL (ref 0–0.4)
EOSINOPHIL NFR BLD: 8 % (ref 0–7)
ERYTHROCYTE [DISTWIDTH] IN BLOOD BY AUTOMATED COUNT: 16 % (ref 11.5–14.5)
GLOBULIN SER CALC-MCNC: 3.7 G/DL (ref 2–4)
GLUCOSE SERPL-MCNC: 103 MG/DL (ref 65–100)
HCT VFR BLD AUTO: 30.7 % (ref 36.6–50.3)
HGB BLD-MCNC: 9.4 G/DL (ref 12.1–17)
IMM GRANULOCYTES # BLD AUTO: 0.1 K/UL (ref 0–0.04)
IMM GRANULOCYTES NFR BLD AUTO: 1 % (ref 0–0.5)
LYMPHOCYTES # BLD: 2.8 K/UL (ref 0.8–3.5)
LYMPHOCYTES NFR BLD: 25 % (ref 12–49)
MCH RBC QN AUTO: 25.4 PG (ref 26–34)
MCHC RBC AUTO-ENTMCNC: 30.6 G/DL (ref 30–36.5)
MCV RBC AUTO: 83 FL (ref 80–99)
MONOCYTES # BLD: 0.9 K/UL (ref 0–1)
MONOCYTES NFR BLD: 8 % (ref 5–13)
NEUTS SEG # BLD: 6.4 K/UL (ref 1.8–8)
NEUTS SEG NFR BLD: 57 % (ref 32–75)
NRBC # BLD: 0 K/UL (ref 0–0.01)
NRBC BLD-RTO: 0 PER 100 WBC
PLATELET # BLD AUTO: 304 K/UL (ref 150–400)
PMV BLD AUTO: 9.6 FL (ref 8.9–12.9)
POTASSIUM SERPL-SCNC: 4.2 MMOL/L (ref 3.5–5.1)
PROT SERPL-MCNC: 6.6 G/DL (ref 6.4–8.2)
RBC # BLD AUTO: 3.7 M/UL (ref 4.1–5.7)
SERVICE CMNT-IMP: NORMAL
SODIUM SERPL-SCNC: 138 MMOL/L (ref 136–145)
WBC # BLD AUTO: 11 K/UL (ref 4.1–11.1)

## 2020-12-15 PROCEDURE — 36415 COLL VENOUS BLD VENIPUNCTURE: CPT

## 2020-12-15 PROCEDURE — 74011000258 HC RX REV CODE- 258: Performed by: INTERNAL MEDICINE

## 2020-12-15 PROCEDURE — 97602 WOUND(S) CARE NON-SELECTIVE: CPT

## 2020-12-15 PROCEDURE — 77030018842 HC SOL IRR SOD CL 9% BAXT -A

## 2020-12-15 PROCEDURE — 74011250636 HC RX REV CODE- 250/636: Performed by: INTERNAL MEDICINE

## 2020-12-15 PROCEDURE — 74011250637 HC RX REV CODE- 250/637: Performed by: INTERNAL MEDICINE

## 2020-12-15 PROCEDURE — 85025 COMPLETE CBC W/AUTO DIFF WBC: CPT

## 2020-12-15 PROCEDURE — 96376 TX/PRO/DX INJ SAME DRUG ADON: CPT

## 2020-12-15 PROCEDURE — 80053 COMPREHEN METABOLIC PANEL: CPT

## 2020-12-15 PROCEDURE — 97530 THERAPEUTIC ACTIVITIES: CPT

## 2020-12-15 PROCEDURE — 99218 HC RM OBSERVATION: CPT

## 2020-12-15 RX ADMIN — LEVOTHYROXINE SODIUM 112 MCG: 0.11 TABLET ORAL at 08:25

## 2020-12-15 RX ADMIN — METHADONE HYDROCHLORIDE 140 MG: 10 TABLET ORAL at 08:47

## 2020-12-15 RX ADMIN — PANTOPRAZOLE SODIUM 40 MG: 40 TABLET, DELAYED RELEASE ORAL at 08:25

## 2020-12-15 RX ADMIN — Medication 3 MG: at 01:00

## 2020-12-15 RX ADMIN — ACETAMINOPHEN 650 MG: 325 TABLET ORAL at 01:00

## 2020-12-15 RX ADMIN — HYDROCODONE BITARTRATE AND ACETAMINOPHEN 1 TABLET: 5; 325 TABLET ORAL at 05:26

## 2020-12-15 RX ADMIN — HYDROCODONE BITARTRATE AND ACETAMINOPHEN 1 TABLET: 5; 325 TABLET ORAL at 18:15

## 2020-12-15 RX ADMIN — PANTOPRAZOLE SODIUM 40 MG: 40 TABLET, DELAYED RELEASE ORAL at 18:16

## 2020-12-15 RX ADMIN — GABAPENTIN 100 MG: 100 CAPSULE ORAL at 22:34

## 2020-12-15 RX ADMIN — LISINOPRIL 20 MG: 20 TABLET ORAL at 08:47

## 2020-12-15 RX ADMIN — PIPERACILLIN AND TAZOBACTAM 3.38 G: 3; .375 INJECTION, POWDER, LYOPHILIZED, FOR SOLUTION INTRAVENOUS at 22:35

## 2020-12-15 RX ADMIN — TAMSULOSIN HYDROCHLORIDE 0.4 MG: 0.4 CAPSULE ORAL at 18:16

## 2020-12-15 RX ADMIN — DOCUSATE SODIUM 50 MG AND SENNOSIDES 8.6 MG 1 TABLET: 8.6; 5 TABLET, FILM COATED ORAL at 08:48

## 2020-12-15 RX ADMIN — GABAPENTIN 100 MG: 100 CAPSULE ORAL at 08:48

## 2020-12-15 RX ADMIN — ACETAMINOPHEN 650 MG: 325 TABLET ORAL at 20:34

## 2020-12-15 RX ADMIN — ALLOPURINOL 100 MG: 100 TABLET ORAL at 08:48

## 2020-12-15 RX ADMIN — PIPERACILLIN AND TAZOBACTAM 3.38 G: 3; .375 INJECTION, POWDER, LYOPHILIZED, FOR SOLUTION INTRAVENOUS at 17:00

## 2020-12-15 RX ADMIN — PIPERACILLIN AND TAZOBACTAM 3.38 G: 3; .375 INJECTION, POWDER, LYOPHILIZED, FOR SOLUTION INTRAVENOUS at 08:24

## 2020-12-15 RX ADMIN — GABAPENTIN 100 MG: 100 CAPSULE ORAL at 18:16

## 2020-12-15 RX ADMIN — HYDROCODONE BITARTRATE AND ACETAMINOPHEN 1 TABLET: 5; 325 TABLET ORAL at 12:08

## 2020-12-15 NOTE — PROGRESS NOTES
Foot and Ankle Surgery Progress Note    Patient: Ajit Orona MRN: [id number]  SSN: [id number]    YOB: 1965  Age: 53 y.o. Sex: male      Admit Date: 12/10/2020    * No surgery found *    Procedure:      Subjective:   Pt seen at bedside this AM.  Patient has no new complaints. Continues to relate pain that is poorly controlled with methadone and tylenol. Pt relates he understands it is a chronic issue. Does not want to increase methadone, relates discussion with outpatient clinic. Pt relates dressing was changed overnight with difficulties. Pt request PT eval for wheelchair need.     Objective:     Visit Vitals  BP (!) 141/81 (BP 1 Location: Left arm, BP Patient Position: At rest;Head of bed elevated (Comment degrees))   Pulse (!) 50   Temp 98.4 °F (36.9 °C)   Resp 18   Wt 107.9 kg (237 lb 14 oz)   SpO2 98%   BMI 31.38 kg/m²       Temp (24hrs), Av.4 °F (36.9 °C), Min:98.1 °F (36.7 °C), Max:98.6 °F (37 °C)      Physical Exam:    GENERAL: alert, cooperative, no distress  Left foot dressing, C/D/I    Data Review:   Results     Procedure Component Value Units Date/Time    Sarahi SHERMAN [[id number]]     Order Status:  Sent Specimen:  Wound from Leg     CULTURE, Sharonda Mitten STAIN [[id number]] Collected:  20 1200    Order Status:  Canceled Specimen:  Wound from Ankle     CULTURE, BLOOD, PAIRED [[id number]] Collected:  12/10/20 1655    Order Status:  Completed Specimen:  Blood Updated:  12/15/20 0541     Special Requests: NO SPECIAL REQUESTS        Culture result: NO GROWTH 5 DAYS       CULTURE, Sharonda Mitten STAIN [[id number]] Collected:  12/10/20 1445    Order Status:  Canceled Specimen:  Wound from Foot         Lab Review:   BMP:   Lab Results   Component Value Date/Time     12/15/2020 03:39 AM    K 4.2 12/15/2020 03:39 AM     12/15/2020 03:39 AM    CO2 27 12/15/2020 03:39 AM    AGAP 6 12/15/2020 03:39 AM     (H) 12/15/2020 03:39 AM    BUN 12 12/15/2020 03:39 AM CREA 0.75 12/15/2020 03:39 AM    GFRAA >60 12/15/2020 03:39 AM    GFRNA >60 12/15/2020 03:39 AM     CBC:   Lab Results   Component Value Date/Time    WBC 11.0 12/15/2020 03:39 AM    HGB 9.4 (L) 12/15/2020 03:39 AM    HCT 30.7 (L) 12/15/2020 03:39 AM     12/15/2020 03:39 AM       Assessment:     Hospital Problems  Date Reviewed: 2020          Codes Class Noted POA    Traumatic subarachnoid hemorrhage ([facility] [address] ICD-10-CM: Q93.5K4T  ICD-9-CM: 852.00  12/10/2020 Unknown        Traumatic subarachnoid hemorrhage without loss of consciousness ([facility] [address] ICD-10-CM: H55.1M2T  ICD-9-CM: 852.01  12/10/2020 Yes              Plan/Recommendations/Medical Decision MakinM with PMHx of chronic methadone use, CVA  (left side affected), with 2 year history of left foot and ankle wounds likely pyoderma gangrenosum with mainly fibrotic base and drainage. Dressing changed today, . Aqua green drainage on dressing of ankle wound. Culture taken. Wound is likely infected, however there is likely an ongoing inflammation process due to pyoderma contributing to presentation. Since there is no active purulence, leukocytosis trending down (11.0< 11.2 < 11.5<11.4<12.7), and patient refuses surgical debridement due to directions from dermatologist, surgical debridement would provide minimal benefit. Wounds are likely to enlarge due to debridement. Recommend local wound care.     Previous discussion between patient and POA regarding transition of care to [facility]) vs continuing local wound care with already established dermatologist. Patient has decided to continue local wound care with dermatologist until he is able to get his personal affairs in order and then be evaluated as outpatient at [facility].  Pt declines any surgical intervention during this admission.      I have contacted [facility] and with patient's consent, he will be contacted to set up an outpatient appointment at his convenience.      Plan: local wound care. Continue abx. Wound care orders in place. Dressing change issues, would recommend wound care nursing for dressing changes. Pt for wheelchair need eval.  Abx: zosyn  Cx: pending 12/13/20  Dsg: lidocaine topical + NS WTD + DSD.   WBS: WBAT         Signed By: Irlanda Mcghee DPM     December 15, 2020

## 2020-12-15 NOTE — PROGRESS NOTES
Foot and Ankle Surgery Progress Note    Patient: Ajit Orona MRN: [id number]  SSN: [id number]    YOB: 1965  Age: 53 y.o. Sex: male      Admit Date: 12/10/2020    * No surgery found *    Procedure:      Subjective:   Pt seen at bedside this PM.   Patient has no complaints. Dressing change pending RN. Objective:     Visit Vitals  BP (!) 141/81 (BP 1 Location: Left arm, BP Patient Position: At rest;Head of bed elevated (Comment degrees))   Pulse (!) 50   Temp 98.4 °F (36.9 °C)   Resp 18   Wt 107.9 kg (237 lb 14 oz)   SpO2 98%   BMI 31.38 kg/m²       Temp (24hrs), Av.4 °F (36.9 °C), Min:98.1 °F (36.7 °C), Max:98.6 °F (37 °C)      Physical Exam:    GENERAL: alert, cooperative, no distress  Left foot with dressing in place, C/D/I. No strike-through.     Data Review:   Results     Procedure Component Value Units Date/Time    CULTURE, Sharonda Mitten STAIN [[id number]]     Order Status:  Sent Specimen:  Wound from Leg     CULTURE, Sharonda Leten STAIN [[id number]] Collected:  20 1200    Order Status:  Canceled Specimen:  Wound from Ankle     CULTURE, BLOOD, PAIRED [[id number]] Collected:  12/10/20 1655    Order Status:  Completed Specimen:  Blood Updated:  20 0742     Special Requests: NO SPECIAL REQUESTS        Culture result: NO GROWTH 4 DAYS       CULTURE, Sharonda Piper STAIN [[id number]] Collected:  12/10/20 1445    Order Status:  Canceled Specimen:  Wound from Foot           Lab Review:   BMP:   Lab Results   Component Value Date/Time     2020 04:49 AM    K 4.2 2020 04:49 AM     2020 04:49 AM    CO2 27 2020 04:49 AM    AGAP 5 2020 04:49 AM     (H) 2020 04:49 AM    BUN 12 2020 04:49 AM    CREA 0.79 2020 04:49 AM    GFRAA >60 2020 04:49 AM    GFRNA >60 2020 04:49 AM     CBC:   Lab Results   Component Value Date/Time    WBC 11.2 (H) 2020 04:49 AM    HGB 9.5 (L) 2020 04:49 AM    HCT 31.2 (L) 2020 04:49 AM     2020 04:49 AM       Assessment:     Hospital Problems  Date Reviewed: 2020          Codes Class Noted POA    Traumatic subarachnoid hemorrhage ([facility] [address] ICD-10-CM: M91.3B0J  ICD-9-CM: 852.00  12/10/2020 Unknown        Traumatic subarachnoid hemorrhage without loss of consciousness ([facility] [address] ICD-10-CM: T50.1H3F  ICD-9-CM: 852.01  12/10/2020 Yes              Plan/Recommendations/Medical Decision MakinM with PMHx of chronic methadone use, CVA  (left side affected), with 2 year history of left foot and ankle wounds likely pyoderma gangrenosum with mainly fibrotic base and drainage. Dressing changed today, . Aqua green drainage on dressing of ankle wound. Culture taken. Wound is likely infected, however there is likely an ongoing inflammation process due to pyoderma contributing to presentation. Since there is no active purulence, leukocytosis trending down (11.2 < 11.5<11.4<12.7), and patient refuses surgical debridement due to directions from dermatologist, surgical debridement would provide minimal benefit. Wounds are likely to enlarge due to debridement. Recommend local wound care.     Previous discussion between patient and POA regarding transition of care to [facility]) vs continuing local wound care with already established dermatologist. Patient has decided to continue local wound care with dermatologist until he is able to get his personal affairs in order and then be evaluated as outpatient at [facility]. Pt declines any surgical intervention during this admission. I have contacted [facility] and with patient's consent, he will be contacted to set up an outpatient appointment at his convenience.      Plan: local wound care. Continue abx. Wound care orders in place. Abx: zosyn  Cx: pending 20  Dsg: lidocaine topical + NS WTD + DSD.   WBS: WBAT       Signed By: Orestes Donato DPM December 14, 2020

## 2020-12-15 NOTE — PROGRESS NOTES
Problem: Mobility Impaired (Adult and Pediatric)  Goal: *Acute Goals and Plan of Care (Insert Text)  Description: FUNCTIONAL STATUS PRIOR TO ADMISSION: Very poor historian. Reports that he was living at a hotel? And then home alone and staying on the 1st floor. Essentially unable to get up from the couch. Per chart, at last recent admission, a L BKA was recommended and he refused; was to be NWB but also refused/was unable to maintain that status    HOME SUPPORT PRIOR TO ADMISSION: Details unknown - very poor historian but sounds like he had very limited assistance     Physical Therapy Goals  Initiated 12/11/2020  1. Patient will move from supine to sit and sit to supine  and scoot up and down in bed with independence within 7 day(s). 2.  Patient will transfer from bed to chair and chair to bed with supervision/set-up using the least restrictive device within 7 day(s). 3.  Patient will perform lateral/scoot transfer to wheelchair vs NWB stand pivot to wheelchair with min A within 7 days. Outcome: Progressing Towards Goal   PHYSICAL THERAPY TREATMENT  Patient: Adwao Sun (70 y.o. male)  Date: 12/15/2020  Diagnosis: SAH (subarachnoid hemorrhage) ([facility]) [I60.9]  [facility] [I60.9]   <principal problem not specified>       Precautions:    Chart, physical therapy assessment, plan of care and goals were reviewed. ASSESSMENT  Patient continues with skilled PT services and is progressing towards goals. He continues to report L foot pain. Patient demonstrates the ability to transition supine<>sit through use of bed rails and momentum. He attempts to stand today with use of recliner back. He continues to require Max Ax2 as he pushes up from the bed (at elevated height) and transitions R UE from bed to chair. Rehabilitation technician moves the bed close to the patient in order to safely transition to sittig.  Patient attempts sit<>stand twice and requests therapists to let go of the second time. He is unable to achieve full hip and knee extension in standing and continues to demonstrate a hard right lean due to L LE pain and hemiplegia. Current Level of Function Impacting Discharge (mobility/balance): Max Ax2 for safety     Other factors to consider for discharge: medical stability, inability to ambulate and transfer at this time, increased pain          PLAN :  Patient continues to benefit from skilled intervention to address the above impairments. Continue treatment per established plan of care. to address goals. Recommendation for discharge: (in order for the patient to meet his/her long term goals)  Therapy up to 5 days/week in SNF setting    This discharge recommendation:  Has been made in collaboration with the attending provider and/or case management    IF patient discharges home will need the following DME: to be determined (TBD)       SUBJECTIVE:   Patient stated the bottom of my foot really hurts.     OBJECTIVE DATA SUMMARY:   Critical Behavior:  Neurologic State: Alert, Appropriate for age, Eyes open spontaneously  Orientation Level: Oriented X4  Cognition: Appropriate decision making, Appropriate for age attention/concentration, Appropriate safety awareness, Follows commands  Safety/Judgement: Awareness of environment, Insight into deficits  Functional Mobility Training:  Bed Mobility:     Supine to Sit: Stand-by assistance  Sit to Supine: Stand-by assistance  Scooting: Stand-by assistance        Transfers:  Sit to Stand: Maximum assistance;Assist x2                                Balance:  Sitting: Intact  Standing: Impaired; With support  Standing - Static: Poor  Standing - Dynamic : Poor  Ambulation/Gait Training:                                                        Stairs:               Therapeutic Exercises:     Pain Rating:      Activity Tolerance:   Fair    After treatment patient left in no apparent distress:   Call bell within reach and seated EOB    COMMUNICATION/COLLABORATION:   The patients plan of care was discussed with: Occupational therapist and Registered nurse.      Addi Dunne, PT   Time Calculation: 20 mins

## 2020-12-15 NOTE — PROGRESS NOTES
BRIGID-TBD  RUR-Observation    CM met with patient to discuss recommendation for SNF rehab. Patient deferred to [facility] his care manager. CM has attempted phone call to [facility] but number provided is not a working number. CM to monitor.      Javy Brink,MS

## 2020-12-15 NOTE — PROGRESS NOTES
916 Per patient previous request attempted to tx patient with methadone on board but patient has to wait until 1130 of oxycodone    1153 patient is asleep. When he is awoken he reports his pain is too severe to mobilize. Patient request that PT come back to check if able.     Thank you,  Bethel COBURNT

## 2020-12-15 NOTE — PROGRESS NOTES
RN changed patient's left foot wound dressing. Patient refused to have a wet to dry dressing. He refused RN to soak 4x4's in saline and wrung out for a wet to dry dressing. Patient requested the xeroform gauze with dry gauze on top with kerlex & ace wrap.
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
## 2020-12-15 NOTE — WOUND CARE
WOCN Note:      Reconsult to complete left foot and leg dressing change from orders placed by podiatrist.  Marybel Perera IJ [id number]. PPE: mask, goggles and gloves.     Chart reviewed. Admitted DX:  SAH (subarachnoid hemorrhage)          Past Medical History:   Diagnosis Date    Aneurysm ([address]       (with stroke)    Bladder tumor      Cancer ([address]       bladder CA    Chronic pain      Family history of bladder cancer      GERD (gastroesophageal reflux disease)      Gout      History of vascular access device 04/25/2019     [facility] VAT 4 FR MIDLINE R Cephalic Limited access    History of vascular access device 04/26/2019     4 fr Midline right Cephalic midline access    Hypercholesterolemia      Hypertension      Lesion of bladder      Seizures ([address]      Stroke ([address] 2006     left sided weakness    Thromboembolus ([address]       left leg    Thyroid disease      TIA (transient ischemic attack) 2012      Assessment:   Patient is A&O x 3, communicative, continent and mobile independently in bed. Bed:  [place]  Patient reports discomfort with wound care to left lower extremity. Right heel intact without erythema.      1. POA Left lower dorsal foot to toes and circumferential lower leg, suspected pyoderma gangrenosum per podiatry note:  95% moist tan and brown 5% moist pink; moderate serous exudate; no odor; no erythema. Tender to touch. Patient reports that it has been debrided many times and can't be debrided anymore. 2.  Head laceration from fall prior to admit:  Approximated with staples. No drainage. Treatment:  Removed ace wrap; soaked dressing in saline; applied lidocaine cream; applied adaptic and Dakins moist gauze to the wound bed; covered with dry gauze, abd pads; wrapped with 2 kerlix and ace wrap.     Wound, Pressure Prevention & Skin Care Recommendations:    1.  Minimize layers of linen/pads under patient to optimize support surface.    2.  Turn/reposition approximately every 2 hours and offload heels. 3.  Manage moisture/ Keep skin folds clean and dry.   4.  Left lower extremity wound care per podiatry orders.     Discussed above plan with patient and Diane Rosenthal RN.     Transition of Care: Plan to follow weekly while admitted.     CYNTHIA DentN RN [facility] Inpatient [facility]ound [facility]  Available on [employer]  Pager [phone]  Office [phone]

## 2020-12-15 NOTE — PROGRESS NOTES
Occupational Therapy    1153 - Attempted visit, patient again requesting therapy return later today 2/2 significant pain. Per chart review and discussion with PT, patient unsafe to mobilize when having such pain. Will try again as able. 1810 - Patient chart reviewed in prep for OT treatment session and therapy cleared by RN. Attempted visit, however patient declining at this time, requesting therapy at 11:30 am. Will defer and follow-up later this morning as able. Thank you.   Carly Mcgee, OTR/L

## 2020-12-15 NOTE — PROGRESS NOTES
Bedside shift change report given to Barrie Lim (oncoming nurse) by Jose J Seo RN (offgoing nurse). Report included the following information SBAR, Kardex, Procedure Summary, Intake/Output, MAR and Recent Results.

## 2020-12-15 NOTE — PROGRESS NOTES
Hospitalist Progress Note  Corky Merino MD  Answering service: [phone] from in house phone        Date of Service:  12/15/2020  NAME:  Amina Colon  :  1965  MRN:  [id number]      Admission Summary:      Amina Colon is a 53 y.o. male who presents with      As per initial admission summary  HPI the patient is a transfer from [facility] for treatment/observation of a traumatic subarachnoid hemorrhage and also treatment of a left leg wound infection.  The patient fell last night around midnight, hitting his head. Cynthia Styles has a laceration to the left parietal area that has been stapled.  The patient complains of a generalized headache which is unchanged since the fall.  He also has a chronic wound to his left leg/foot that appears to be secondarily infected.  The physician at [facility] spoke with the intensivist here and they accepted the patient in transfer.     On my HP. Patient with past medical history significant for chronic left foot ulcers since at least 2018 on chronic methadone and followed by behavioral health clinic history of stroke in  patient was transferred from [facility] as per documentation he does not want to go over there patient had a fall overnight and hit his head there is a laceration to the left parietal area that has been scattered patient complained of generalized headache and also concerned about chronic wound on the left foot patient was initially accepted to the ICU but repeat imaging here at [facility] did not show any bleeding patient does not report any fever, chest pain, shortness of breath or any other associated symptoms. He told me he lives by himself and one of his friend helps him with the dressing of the wound.   As per documentation patient was previously recommended amputation by the plastic surgery but he refused admitted to hospitalist service podiatry and infectious disease consulted       Interval history / Subjective:     Patient seen for Follow up of foot wound     Patient seen and examined by the bedside, Labs, images and notes reviewed  Patient complaining of foot pain. ID and podiatry consulted  Still on IV zosyn. Getting pain medications. ID consult pending   CM working on SNF placement      Assessment & Plan:     1. Traumatic sub arachnoid hamemorrhage, resolved   Repeat CT showed it has been resolved  Neurosurgery signed off  neuro checks for now     # chronic foot ulcer  Started on zosyn IV  Prior history of wound culture positive   Podiatry and ID consulted,   Wound care nurse  Wound cultures ordered   Podiatry folllowing. No surgical intervention a this time. Need to switch to oral abs on discharge? Cultures still pending   ID consult pending      #chronic methadone use  Will continue home dose     # hypothyroidism  Continue synthroid     #HTN  Continue lisinopril       Code status: full code   DVT prophylaxis: scds    Care Plan discussed with: Patient/Family  Disposition: SNF/LTC and TBD     Hospital Problems  Date Reviewed: 9/9/2020          Codes Class Noted POA    Traumatic subarachnoid hemorrhage ([facility] [address] ICD-10-CM: S60.5V8W  ICD-9-CM: 852.00  12/10/2020 Unknown        Traumatic subarachnoid hemorrhage without loss of consciousness ([facility] [address] ICD-10-CM: L33.0C7Y  ICD-9-CM: 852.01  12/10/2020 Yes                Review of Systems:   A comprehensive review of systems was negative except for that written in the HPI. Vital Signs:    Last 24hrs VS reviewed since prior progress note.  Most recent are:  Visit Vitals  /65   Pulse (!) 52   Temp 98.7 °F (37.1 °C)   Resp 18   Wt 107.9 kg (237 lb 14 oz)   SpO2 98%   BMI 31.38 kg/m²         Intake/Output Summary (Last 24 hours) at 12/15/2020 1530  Last data filed at 12/15/2020 0103  Gross per 24 hour   Intake 500 ml   Output 1600 ml   Net -1100 ml        Physical Examination:   I had a face to face encounter with this patient and independently examined them on December 15, 2020 as outlined below:        General:  Alert, cooperative, no distress, appears stated age. Head:  Normocephalic, staples left parietal area    Lungs:   Clear to auscultation bilaterally. Chest wall:  No tenderness or deformity. Heart:  Regular rate and rhythm, S1, S2 normal, no murmur, click, rub or gallop. Abdomen:   Soft, non-tender. Bowel sounds normal. No masses,  No organomegaly. Extremities: Chronic wound left foot , dressing    Pulses: 2+ and symmetric all extremities. Neurologic: CNII-XII intact. Normal strength, sensation and reflexes throughout. Data Review:    Review and/or order of clinical lab test  Review and/or order of tests in the radiology section of CPT  Review and/or order of tests in the medicine section of CPT      Labs:     Recent Labs     12/15/20  0339 12/14/20  0449   WBC 11.0 11.2*   HGB 9.4* 9.5*   HCT 30.7* 31.2*    312     Recent Labs     12/15/20  0339 12/14/20  0449 12/13/20  0518    138 140   K 4.2 4.2 4.1    106 106   CO2 27 27 28   BUN 12 12 10   CREA 0.75 0.79 0.72   * 111* 101*   CA 8.3* 8.6 8.3*     Recent Labs     12/15/20  0339 12/14/20  0449 12/13/20  0518   ALT 14 13 12   * 149* 148*   TBILI 0.2 0.2 0.2   TP 6.6 6.3* 6.4   ALB 2.9* 2.8* 2.8*   GLOB 3.7 3.5 3.6     No results for input(s): INR, PTP, APTT, INREXT, INREXT in the last 72 hours. No results for input(s): FE, TIBC, PSAT, FERR in the last 72 hours. Lab Results   Component Value Date/Time    Folate 8.6 12/13/2012 03:20 AM      No results for input(s): PH, PCO2, PO2 in the last 72 hours. No results for input(s): CPK, CKNDX, TROIQ in the last 72 hours.     No lab exists for component: CPKMB  Lab Results   Component Value Date/Time    Cholesterol, total 152 12/13/2012 03:30 AM    HDL Cholesterol 26 12/13/2012 03:30 AM    LDL, calculated 92.2 12/13/2012 03:30 AM    Triglyceride 169 (H) 12/13/2012 03:30 AM    CHOL/HDL Ratio 5.8 (H) 12/13/2012 03:30 AM     Lab Results   Component Value Date/Time    Glucose (POC) 150 (H) 03/19/2018 11:45 AM    Glucose (POC) 123 (H) 03/18/2018 08:55 PM    Glucose (POC) 96 03/16/2018 04:58 PM    Glucose (POC) 116 (H) 12/12/2012 12:42 PM    Glucose (POC) 99 12/11/2012 11:27 PM     Lab Results   Component Value Date/Time    Color YELLOW/STRAW 05/20/2019 05:52 AM    Appearance CLEAR 05/20/2019 05:52 AM    Specific gravity 1.011 05/20/2019 05:52 AM    pH (UA) 5.5 05/20/2019 05:52 AM    Protein NEGATIVE  05/20/2019 05:52 AM    Glucose NEGATIVE  05/20/2019 05:52 AM    Ketone NEGATIVE  05/20/2019 05:52 AM    Bilirubin NEGATIVE  05/20/2019 05:52 AM    Urobilinogen 0.2 05/20/2019 05:52 AM    Nitrites NEGATIVE  05/20/2019 05:52 AM    Leukocyte Esterase NEGATIVE  05/20/2019 05:52 AM    Epithelial cells FEW 05/20/2019 05:52 AM    Bacteria NEGATIVE  05/20/2019 05:52 AM    WBC 0-4 05/20/2019 05:52 AM    RBC 0-5 05/20/2019 05:52 AM         Medications Reviewed:     Current Facility-Administered Medications   Medication Dose Route Frequency    sodium hypochlorite (QUARTER STRENGTH DAKIN'S) 0.125% irrigation (bottle)   Topical DAILY    HYDROcodone-acetaminophen (NORCO) 5-325 mg per tablet 1 Tab  1 Tab Oral Q6H PRN    diphenhydrAMINE (BENADRYL) capsule 25 mg  25 mg Oral Q6H PRN    lidocaine (XYLOCAINE) 4 % cream   Topical PRN    sodium chloride (NS) flush 5-40 mL  5-40 mL IntraVENous Q8H    sodium chloride (NS) flush 5-40 mL  5-40 mL IntraVENous PRN    acetaminophen (TYLENOL) tablet 650 mg  650 mg Oral Q6H PRN    Or    acetaminophen (TYLENOL) suppository 650 mg  650 mg Rectal Q6H PRN    polyethylene glycol (MIRALAX) packet 17 g  17 g Oral DAILY PRN    promethazine (PHENERGAN) tablet 12.5 mg  12.5 mg Oral Q6H PRN    Or    ondansetron (ZOFRAN) injection 4 mg  4 mg IntraVENous Q6H PRN    piperacillin-tazobactam (ZOSYN) 3.375 g in 0.9% sodium chloride (MBP/ADV) 100 mL MBP  3.375 g IntraVENous Q8H    colchicine tablet 0.6 mg  0.6 mg Oral DAILY PRN    gabapentin (NEURONTIN) capsule 100 mg  100 mg Oral TID    levothyroxine (SYNTHROID) tablet 112 mcg  112 mcg Oral ACB    lisinopriL (PRINIVIL, ZESTRIL) tablet 20 mg  20 mg Oral DAILY    melatonin tablet 3 mg  3 mg Oral QHS PRN    methadone (DOLOPHINE) tablet 140 mg  140 mg Oral DAILY    pantoprazole (PROTONIX) tablet 40 mg  40 mg Oral ACB&D    tamsulosin (FLOMAX) capsule 0.4 mg  0.4 mg Oral PCD    allopurinoL (ZYLOPRIM) tablet 100 mg  100 mg Oral DAILY    senna-docusate (PERICOLACE) 8.6-50 mg per tablet 1 Tab  1 Tab Oral DAILY     ______________________________________________________________________  EXPECTED LENGTH OF STAY: 3d 2h  ACTUAL LENGTH OF STAY:          1                 Benoit Najera MD

## 2020-12-15 NOTE — PROGRESS NOTES
Problem: Self Care Deficits Care Plan (Adult)  Goal: *Acute Goals and Plan of Care (Insert Text)  Description: FUNCTIONAL STATUS PRIOR TO ADMISSION: Very poor historian. Reports that he was living at a hotel? And then home alone and staying on the 1st floor with use of old w/c from a wound clinic? Essentially unable to get up from the couch. Per chart, at last recent admission, a L BKA was recommended and he refused; was to be NWB but also refused/was unable to maintain that status. HOME SUPPORT PRIOR TO ADMISSION: Details unknown - very poor historian but sounds like he had very limited assistance; per chart lives with friend/mPOA who provides assistance. Occupational Therapy Goals  Initiated 12/11/2020  1. Patient will perform grooming, seated EOB, with supervision/set-up using compensatory strategies PRN within 7 day(s). 2.  Patient will perform upper body dressing with supervision/set-up using noe technique within 7 day(s). 3.  Patient will perform lower body dressing with minimal assistance using AE/compensatory strategies PRN within 7 day(s). 4.  Patient will perform lateral t/f to [facility] with maximal assistance within 7 day(s). 5.  Patient will perform all aspects of toileting with minimal assistance/contact guard assist within 7 day(s). 6.  Patient will participate in upper extremity therapeutic exercise/activities with supervision/set-up for 5 minutes within 7 day(s). 7.  Patient will utilize energy conservation techniques during functional activities with verbal cues within 7 day(s). Outcome: Progressing Towards Goal     OCCUPATIONAL THERAPY TREATMENT  Patient: Sav Spring (89 y.o. male)  Date: 12/15/2020  Diagnosis: SAH (subarachnoid hemorrhage) ([facility]) [I60.9]  [facility] [I60.9]   <principal problem not specified>       Precautions:    Chart, occupational therapy assessment, plan of care, and goals were reviewed.     ASSESSMENT  Patient continues with skilled OT services and is progressing towards goals. Patient participating in transfer training in prep for functional transfers OOB to [facility] or to chair. Patient able to stand with max assist x2 - note poor insight as patient telling therapists to let go despite patient being extremely unsteady and relying heavily on external supports. Tolerated standing x2 with bed moved to just behind LEs to facilitate safe sitting 2/2 patient impulsivity and impaired mobility 2/2 L noe and significant LLE pain. Patient overall with good effort and participation this session, with further mobility and activity deferred following standing attempts due to severe pain. Continue to recommend SNF rehab at discharge to maximize functional outcomes. Current Level of Function Impacting Discharge (ADLs): max assist x2 to stand; assist for distal LLE tasks; set-up and material management for upper body grooming / self-feeding    Other factors to consider for discharge: pain         PLAN :  Patient continues to benefit from skilled intervention to address the above impairments. Continue treatment per established plan of care. to address goals. Recommend with staff: encourage max participation in ADLs; bed pan for toileting    Recommend next OT session: lower body ADLs; lateral transfers to drop-arm [facility]    Recommendation for discharge: (in order for the patient to meet his/her long term goals)  Therapy up to 5 days/week in SNF setting    This discharge recommendation:  Has been made in collaboration with the attending provider and/or case management    IF patient discharges home will need the following DME: bedside commode, mechanical lift, and wheelchair       SUBJECTIVE:   Patient stated this foot hurts so bad\" in reference to LLE. OBJECTIVE DATA SUMMARY:   Cognitive/Behavioral Status:  Neurologic State: Alert  Orientation Level: Oriented X4  Cognition: Impaired decision making;Poor safety awareness; Impulsive  Perception: Appears intact  Perseveration: No perseveration noted  Safety/Judgement: Decreased insight into deficits; Decreased awareness of need for safety;Decreased awareness of need for assistance; Fall prevention    Functional Mobility and Transfers for ADLs:  Bed Mobility:  Supine to Sit: Stand-by assistance  Sit to Supine: Stand-by assistance  Scooting: Stand-by assistance    Transfers:  Sit to Stand: Maximum assistance;Assist x2   Stand to Sit: Maximum assistance;Assist x2    Balance:  Sitting: Intact  Standing: Impaired; With support  Standing - Static: Poor  Standing - Dynamic : Poor    ADL Intervention:  Lower Body Dressing Assistance  Shoes with Velcro: Total assistance (dependent)(for LLE cast shoe)  Shoes with Cloth Laces: Contact guard assistance(R shoe, laces already tied)  Position Performed: Seated edge of bed  Cues: Verbal cues provided;Visual cues provided;Physical assistance    Cognitive Retraining  Orientation Retraining: Reorienting; Awareness of environment;Situation(need for safety and assist)  Safety/Judgement: Decreased insight into deficits; Decreased awareness of need for safety;Decreased awareness of need for assistance; Fall prevention    Pain:  Patient with significant pain in L foot reported - deferred further mobility and activity after 2 trials standing. Activity Tolerance:   Fair, Poor, requires frequent rest breaks, and limited by pain! After treatment patient left in no apparent distress:   Call bell within reach and seated EOB    COMMUNICATION/COLLABORATION:   The patients plan of care was discussed with: Physical therapist and Registered nurse.      Carlin Mejia OT  Time Calculation: 14 mins

## 2020-12-16 PROCEDURE — 96376 TX/PRO/DX INJ SAME DRUG ADON: CPT

## 2020-12-16 PROCEDURE — 74011000250 HC RX REV CODE- 250: Performed by: STUDENT IN AN ORGANIZED HEALTH CARE EDUCATION/TRAINING PROGRAM

## 2020-12-16 PROCEDURE — 74011250637 HC RX REV CODE- 250/637: Performed by: INTERNAL MEDICINE

## 2020-12-16 PROCEDURE — 99218 HC RM OBSERVATION: CPT

## 2020-12-16 PROCEDURE — 74011000258 HC RX REV CODE- 258: Performed by: INTERNAL MEDICINE

## 2020-12-16 PROCEDURE — 99223 1ST HOSP IP/OBS HIGH 75: CPT | Performed by: INTERNAL MEDICINE

## 2020-12-16 PROCEDURE — 74011250636 HC RX REV CODE- 250/636: Performed by: INTERNAL MEDICINE

## 2020-12-16 PROCEDURE — 97530 THERAPEUTIC ACTIVITIES: CPT

## 2020-12-16 RX ADMIN — HYDROCODONE BITARTRATE AND ACETAMINOPHEN 1 TABLET: 5; 325 TABLET ORAL at 12:35

## 2020-12-16 RX ADMIN — LEVOTHYROXINE SODIUM 112 MCG: 0.11 TABLET ORAL at 07:27

## 2020-12-16 RX ADMIN — PANTOPRAZOLE SODIUM 40 MG: 40 TABLET, DELAYED RELEASE ORAL at 16:50

## 2020-12-16 RX ADMIN — HYDROCODONE BITARTRATE AND ACETAMINOPHEN 1 TABLET: 5; 325 TABLET ORAL at 19:24

## 2020-12-16 RX ADMIN — HYDROCODONE BITARTRATE AND ACETAMINOPHEN 1 TABLET: 5; 325 TABLET ORAL at 03:25

## 2020-12-16 RX ADMIN — DOCUSATE SODIUM 50 MG AND SENNOSIDES 8.6 MG 1 TABLET: 8.6; 5 TABLET, FILM COATED ORAL at 09:38

## 2020-12-16 RX ADMIN — PANTOPRAZOLE SODIUM 40 MG: 40 TABLET, DELAYED RELEASE ORAL at 07:27

## 2020-12-16 RX ADMIN — LISINOPRIL 20 MG: 20 TABLET ORAL at 09:38

## 2020-12-16 RX ADMIN — PIPERACILLIN AND TAZOBACTAM 3.38 G: 3; .375 INJECTION, POWDER, LYOPHILIZED, FOR SOLUTION INTRAVENOUS at 16:50

## 2020-12-16 RX ADMIN — ACETAMINOPHEN 650 MG: 325 TABLET ORAL at 22:05

## 2020-12-16 RX ADMIN — ACETAMINOPHEN 650 MG: 325 TABLET ORAL at 05:30

## 2020-12-16 RX ADMIN — PIPERACILLIN AND TAZOBACTAM 3.38 G: 3; .375 INJECTION, POWDER, LYOPHILIZED, FOR SOLUTION INTRAVENOUS at 07:27

## 2020-12-16 RX ADMIN — DAKIN'S SOLUTION 0.125% (QUARTER STRENGTH): 0.12 SOLUTION at 09:00

## 2020-12-16 RX ADMIN — METHADONE HYDROCHLORIDE 140 MG: 10 TABLET ORAL at 09:38

## 2020-12-16 RX ADMIN — GABAPENTIN 100 MG: 100 CAPSULE ORAL at 09:38

## 2020-12-16 RX ADMIN — GABAPENTIN 100 MG: 100 CAPSULE ORAL at 22:05

## 2020-12-16 RX ADMIN — ALLOPURINOL 100 MG: 100 TABLET ORAL at 09:38

## 2020-12-16 RX ADMIN — TAMSULOSIN HYDROCHLORIDE 0.4 MG: 0.4 CAPSULE ORAL at 19:24

## 2020-12-16 RX ADMIN — GABAPENTIN 100 MG: 100 CAPSULE ORAL at 16:50

## 2020-12-16 NOTE — WOUND CARE
[facility] Note:      Follow up with patient with Dr Joann Girard.  [place] [address]. PPE: mask, goggles and gloves.     Chart reviewed. Admitted DX:  SAH (subarachnoid hemorrhage)          Past Medical History:   Diagnosis Date    Aneurysm ([address]       (with stroke)    Bladder tumor      Cancer ([address]       bladder CA    Chronic pain      Family history of bladder cancer      GERD (gastroesophageal reflux disease)      Gout      History of vascular access device 04/25/2019     [facility] VAT 4 FR MIDLINE R Cephalic Limited access    History of vascular access device 04/26/2019     4 fr Midline right Cephalic midline access    Hypercholesterolemia      Hypertension      Lesion of bladder      Seizures ([address]      Stroke ([address] 2006     left sided weakness    Thromboembolus ([address]       left leg    Thyroid disease      TIA (transient ischemic attack) 2012      Assessment:   Patient is A&O x 3, communicative, continent and mobile independently in bed. Bed:  [place]  Patient reports discomfort with wound care to left lower extremity. Right heel intact without erythema.      1. POA Left lower dorsal foot to toes and circumferential lower leg, suspected pyoderma gangrenosum per podiatry note:  95% moist tan and brown 5% moist pink; moderate serous exudate; no odor; no erythema. Tender to touch. Patient reports that it has been debrided many times and can't be debrided anymore.     pics from chart media                  2.  Head laceration from fall prior to admit:  Approximated with staples.  No drainage.     Treatment:  Removed ace wrap; soaked dressing in saline; applied lidocaine cream; applied adaptic and Dakins moist gauze to the wound bed; covered with dry gauze, abd pads; wrapped with 2 kerlix and ace wrap.     Wound, Pressure Prevention & Skin Care Recommendations:    1. Minimize layers of linen/pads under patient to optimize support surface.    2.  Turn/reposition approximately every 2 hours and offload heels. 3.  Manage moisture/ Keep skin folds clean and dry.   4.  Left lower extremity wound care per podiatry orders.     Discussed above plan with patient and Ariela Calderon RN.     Transition of Care: Plan to follow weekly while admitted.     CYNTHIA JaquezN RN [facility] Inpatient Wound Care  Available on Perfect Serve  Pager [phone]  Office [phone]

## 2020-12-16 NOTE — PROGRESS NOTES
Problem: Self Care Deficits Care Plan (Adult)  Goal: *Acute Goals and Plan of Care (Insert Text)  Description: FUNCTIONAL STATUS PRIOR TO ADMISSION: Very poor historian. Reports that he was living at a hotel? And then home alone and staying on the 1st floor with use of old w/c from a wound clinic? Essentially unable to get up from the couch. Per chart, at last recent admission, a L BKA was recommended and he refused; was to be NWB but also refused/was unable to maintain that status. HOME SUPPORT PRIOR TO ADMISSION: Details unknown - very poor historian but sounds like he had very limited assistance; per chart lives with friend/mPOA who provides assistance. Occupational Therapy Goals  Initiated 12/11/2020  1. Patient will perform grooming, seated EOB, with supervision/set-up using compensatory strategies PRN within 7 day(s). 2.  Patient will perform upper body dressing with supervision/set-up using noe technique within 7 day(s). 3.  Patient will perform lower body dressing with minimal assistance using AE/compensatory strategies PRN within 7 day(s). 4.  Patient will perform lateral t/f to [facility] with maximal assistance within 7 day(s). 5.  Patient will perform all aspects of toileting with minimal assistance/contact guard assist within 7 day(s). 6.  Patient will participate in upper extremity therapeutic exercise/activities with supervision/set-up for 5 minutes within 7 day(s). 7.  Patient will utilize energy conservation techniques during functional activities with verbal cues within 7 day(s). Outcome: Progressing Towards Goal   OCCUPATIONAL THERAPY TREATMENT  Patient: Deep Juarez (1 y.o. male)  Date: 12/16/2020  Diagnosis: SAH (subarachnoid hemorrhage) ([facility]) [I60.9]  [facility] [I60.9] <principal problem not specified>       Precautions:  Fall  Chart, occupational therapy assessment, plan of care, and goals were reviewed.     ASSESSMENT  Patient continues with skilled OT services and is slowly progressing towards goals. Received in chair requesting to return to bed. Completed lateral transfer to his L as arm rest on recliner would drop. Max Ax2 as it was towards his noe side. Supine > sit with supervision. Patient deferring ADL tasks at this time. Patient needing verbal cues to decrease pace and await therapist's safety instructions. With direct cues demo good participation and understanding. He expressed need for use of BSC today. Located platform drop arm BSC and placed in room. Discussed with nursing. Current Level of Function Impacting Discharge (mobility/balance): Lateral transfers: Mod- max A x2 to his left     Current Level of Function Impacting Discharge (ADLs): UB care set up, LB care max A    Other factors to consider for discharge: patient impulsive and needing physical assist with all mobility tasks, LB care and toielting. He is unsafe for dc alone. PLAN :  Patient continues to benefit from skilled intervention to address the above impairments. Continue treatment per established plan of care. to address goals. Recommend with staff: x2 assist for lateral transfer to drop arm BSC or recliner.   If patient not following safety commands, recommen abort and use of Myriam or bed pan    Recommend next OT session: LB dressing ed at EOB or supine with rolling    Recommendation for discharge: (in order for the patient to meet his/her long term goals)  Therapy up to 5 days/week in SNF setting    This discharge recommendation:  Has been made in collaboration with the attending provider and/or case management    IF patient discharges home will need the following DME: TBD with progression and dc planning       SUBJECTIVE:   Patient stated .    OBJECTIVE DATA SUMMARY:   Cognitive/Behavioral Status:                      Functional Mobility and Transfers for ADLs:  Bed Mobility:  Supine to Sit: Stand-by assistance  Sit to Supine: Stand-by assistance  Scooting: Stand-by assistance;Assist x2; Moderate assistance    Transfers:        Bed to Chair: Moderate assistance;Maximum assistance;Assist x2    Balance:  Sitting: Intact    ADL Intervention:     Provided ed on body mechanics, safety and sequencing for lateral transfer to return to bed from recliner chair. PAtient requesting to stand- ed on poor ability to complete this at this time with hemiplegia, L foot wound and overall deconditioning. Agreeable to lateral transfer with drop arm of recliner. Pain:  Grunting during session- did not verbalize where pain was    Activity Tolerance:   Fair    After treatment patient left in no apparent distress:   Supine in bed and Call bell within reach    COMMUNICATION/COLLABORATION:   The patients plan of care was discussed with: Physical therapist and Registered nurse.      Grisel Walters OT  Time Calculation: 12 mins

## 2020-12-16 NOTE — PROGRESS NOTES
Problem: Mobility Impaired (Adult and Pediatric)  Goal: *Acute Goals and Plan of Care (Insert Text)  Description: FUNCTIONAL STATUS PRIOR TO ADMISSION: Very poor historian. Reports that he was living at a hotel? And then home alone and staying on the 1st floor. Essentially unable to get up from the couch. Per chart, at last recent admission, a L BKA was recommended and he refused; was to be NWB but also refused/was unable to maintain that status    HOME SUPPORT PRIOR TO ADMISSION: Details unknown - very poor historian but sounds like he had very limited assistance     Physical Therapy Goals  Initiated 12/11/2020  1. Patient will move from supine to sit and sit to supine  and scoot up and down in bed with independence within 7 day(s). 2.  Patient will transfer from bed to chair and chair to bed with supervision/set-up using the least restrictive device within 7 day(s). 3.  Patient will perform lateral/scoot transfer to wheelchair vs NWB stand pivot to wheelchair with min A within 7 days. Outcome: Progressing Towards Goal   PHYSICAL THERAPY TREATMENT  Patient: Sav Spring (1 y.o. male)  Date: 12/16/2020  Diagnosis: SAH (subarachnoid hemorrhage) ([facility]) [I60.9]  [facility]ach[facility] [I60.9] <principal problem not specified>       Precautions:    Chart, physical therapy assessment, plan of care and goals were reviewed. ASSESSMENT  Patient continues with skilled PT services and is progressing towards goals. Patient is agreeable to transferring out of bed for linen change. He demonstrates the ability to transfer to the right to a drop arm chair with SBA. Patient returns to bed towards the  and [facility] requiring VC and mod-Max A x2 in order to assist with scooting towards the weaker side.      Current Level of Function Impacting Discharge (mobility/balance): SBA transfer to the R, Mod- max A x2 to the L    Other factors to consider for discharge: medical stability, inability to stand and transfer at this time, increased risk for falls, hx of falls          PLAN :  Patient continues to benefit from skilled intervention to address the above impairments. Continue treatment per established plan of care. to address goals. Recommendation for discharge: (in order for the patient to meet his/her long term goals)  Therapy up to 5 days/week in SNF setting    This discharge recommendation:  Has been made in collaboration with the attending provider and/or case management    IF patient discharges home will need the following DME: to be determined (TBD)       SUBJECTIVE:   [facility].     OBJECTIVE DATA SUMMARY:   Critical Behavior:  Neurologic State: Alert  Orientation Level: Oriented X4  Cognition: Impaired decision making  Safety/Judgement: Decreased insight into deficits, Decreased awareness of need for safety, Decreased awareness of need for assistance, Fall prevention  Functional Mobility Training:  Bed Mobility:     Supine to Sit: Stand-by assistance  Sit to Supine: Stand-by assistance  Scooting: Stand-by assistance;Assist x2; Moderate assistance        Transfers:              Bed to Chair: Moderate assistance;Maximum assistance;Assist x2                    Balance:  Sitting: Intact  Ambulation/Gait Training:                                                        Stairs: Therapeutic Exercises:     Pain Rating:      Activity Tolerance:   Good    After treatment patient left in no apparent distress:   Supine in bed, Call bell within reach, and Side rails x 3    COMMUNICATION/COLLABORATION:   The patients plan of care was discussed with: Occupational therapist and Registered nurse.      Skip Fernandez, PT   Time Calculation: 17 mins

## 2020-12-17 LAB
ANION GAP SERPL CALC-SCNC: 6 MMOL/L (ref 5–15)
BASOPHILS # BLD: 0.1 K/UL (ref 0–0.1)
BASOPHILS NFR BLD: 1 % (ref 0–1)
BUN SERPL-MCNC: 13 MG/DL (ref 6–20)
BUN/CREAT SERPL: 18 (ref 12–20)
CALCIUM SERPL-MCNC: 8.9 MG/DL (ref 8.5–10.1)
CHLORIDE SERPL-SCNC: 105 MMOL/L (ref 97–108)
CO2 SERPL-SCNC: 26 MMOL/L (ref 21–32)
CREAT SERPL-MCNC: 0.71 MG/DL (ref 0.7–1.3)
DIFFERENTIAL METHOD BLD: ABNORMAL
EOSINOPHIL # BLD: 0.9 K/UL (ref 0–0.4)
EOSINOPHIL NFR BLD: 8 % (ref 0–7)
ERYTHROCYTE [DISTWIDTH] IN BLOOD BY AUTOMATED COUNT: 15.8 % (ref 11.5–14.5)
GLUCOSE SERPL-MCNC: 97 MG/DL (ref 65–100)
HCT VFR BLD AUTO: 32.7 % (ref 36.6–50.3)
HGB BLD-MCNC: 10 G/DL (ref 12.1–17)
IMM GRANULOCYTES # BLD AUTO: 0.1 K/UL (ref 0–0.04)
IMM GRANULOCYTES NFR BLD AUTO: 1 % (ref 0–0.5)
LYMPHOCYTES # BLD: 3 K/UL (ref 0.8–3.5)
LYMPHOCYTES NFR BLD: 27 % (ref 12–49)
MCH RBC QN AUTO: 25.3 PG (ref 26–34)
MCHC RBC AUTO-ENTMCNC: 30.6 G/DL (ref 30–36.5)
MCV RBC AUTO: 82.8 FL (ref 80–99)
MONOCYTES # BLD: 0.9 K/UL (ref 0–1)
MONOCYTES NFR BLD: 8 % (ref 5–13)
NEUTS SEG # BLD: 6.4 K/UL (ref 1.8–8)
NEUTS SEG NFR BLD: 55 % (ref 32–75)
NRBC # BLD: 0 K/UL (ref 0–0.01)
NRBC BLD-RTO: 0 PER 100 WBC
PLATELET # BLD AUTO: 316 K/UL (ref 150–400)
PMV BLD AUTO: 10.1 FL (ref 8.9–12.9)
POTASSIUM SERPL-SCNC: 4.2 MMOL/L (ref 3.5–5.1)
RBC # BLD AUTO: 3.95 M/UL (ref 4.1–5.7)
SODIUM SERPL-SCNC: 137 MMOL/L (ref 136–145)
WBC # BLD AUTO: 11.3 K/UL (ref 4.1–11.1)

## 2020-12-17 PROCEDURE — 80048 BASIC METABOLIC PNL TOTAL CA: CPT

## 2020-12-17 PROCEDURE — 96376 TX/PRO/DX INJ SAME DRUG ADON: CPT

## 2020-12-17 PROCEDURE — 74011000258 HC RX REV CODE- 258: Performed by: INTERNAL MEDICINE

## 2020-12-17 PROCEDURE — 74011250637 HC RX REV CODE- 250/637: Performed by: INTERNAL MEDICINE

## 2020-12-17 PROCEDURE — 85025 COMPLETE CBC W/AUTO DIFF WBC: CPT

## 2020-12-17 PROCEDURE — 99218 HC RM OBSERVATION: CPT

## 2020-12-17 PROCEDURE — 74011250636 HC RX REV CODE- 250/636: Performed by: INTERNAL MEDICINE

## 2020-12-17 PROCEDURE — 36415 COLL VENOUS BLD VENIPUNCTURE: CPT

## 2020-12-17 RX ORDER — HYDROCODONE BITARTRATE AND ACETAMINOPHEN 5; 325 MG/1; MG/1
2 TABLET ORAL
Status: DISCONTINUED | OUTPATIENT
Start: 2020-12-17 | End: 2020-12-22 | Stop reason: HOSPADM

## 2020-12-17 RX ORDER — CLOTRIMAZOLE AND BETAMETHASONE DIPROPIONATE 10; .64 MG/G; MG/G
CREAM TOPICAL 2 TIMES DAILY
Status: DISCONTINUED | OUTPATIENT
Start: 2020-12-17 | End: 2020-12-22 | Stop reason: HOSPADM

## 2020-12-17 RX ORDER — IBUPROFEN 600 MG/1
600 TABLET ORAL DAILY
Status: COMPLETED | OUTPATIENT
Start: 2020-12-17 | End: 2020-12-21

## 2020-12-17 RX ADMIN — PANTOPRAZOLE SODIUM 40 MG: 40 TABLET, DELAYED RELEASE ORAL at 06:30

## 2020-12-17 RX ADMIN — DOCUSATE SODIUM 50 MG AND SENNOSIDES 8.6 MG 1 TABLET: 8.6; 5 TABLET, FILM COATED ORAL at 10:39

## 2020-12-17 RX ADMIN — IBUPROFEN 600 MG: 600 TABLET, FILM COATED ORAL at 19:20

## 2020-12-17 RX ADMIN — ALLOPURINOL 100 MG: 100 TABLET ORAL at 10:39

## 2020-12-17 RX ADMIN — PIPERACILLIN AND TAZOBACTAM 3.38 G: 3; .375 INJECTION, POWDER, LYOPHILIZED, FOR SOLUTION INTRAVENOUS at 23:33

## 2020-12-17 RX ADMIN — PANTOPRAZOLE SODIUM 40 MG: 40 TABLET, DELAYED RELEASE ORAL at 19:20

## 2020-12-17 RX ADMIN — ACETAMINOPHEN 650 MG: 325 TABLET ORAL at 06:30

## 2020-12-17 RX ADMIN — HYDROCODONE BITARTRATE AND ACETAMINOPHEN 1 TABLET: 5; 325 TABLET ORAL at 04:54

## 2020-12-17 RX ADMIN — PIPERACILLIN AND TAZOBACTAM 3.38 G: 3; .375 INJECTION, POWDER, LYOPHILIZED, FOR SOLUTION INTRAVENOUS at 08:25

## 2020-12-17 RX ADMIN — GABAPENTIN 100 MG: 100 CAPSULE ORAL at 23:33

## 2020-12-17 RX ADMIN — HYDROCODONE BITARTRATE AND ACETAMINOPHEN 2 TABLET: 5; 325 TABLET ORAL at 21:33

## 2020-12-17 RX ADMIN — LEVOTHYROXINE SODIUM 112 MCG: 0.11 TABLET ORAL at 06:30

## 2020-12-17 RX ADMIN — DAKIN'S SOLUTION 0.125% (QUARTER STRENGTH): 0.12 SOLUTION at 09:00

## 2020-12-17 RX ADMIN — METHADONE HYDROCHLORIDE 140 MG: 10 TABLET ORAL at 10:38

## 2020-12-17 RX ADMIN — GABAPENTIN 100 MG: 100 CAPSULE ORAL at 10:39

## 2020-12-17 RX ADMIN — PIPERACILLIN AND TAZOBACTAM 3.38 G: 3; .375 INJECTION, POWDER, LYOPHILIZED, FOR SOLUTION INTRAVENOUS at 15:19

## 2020-12-17 RX ADMIN — HYDROCODONE BITARTRATE AND ACETAMINOPHEN 1 TABLET: 5; 325 TABLET ORAL at 15:18

## 2020-12-17 RX ADMIN — TAMSULOSIN HYDROCHLORIDE 0.4 MG: 0.4 CAPSULE ORAL at 19:20

## 2020-12-17 RX ADMIN — GABAPENTIN 100 MG: 100 CAPSULE ORAL at 15:18

## 2020-12-17 RX ADMIN — PIPERACILLIN AND TAZOBACTAM 3.38 G: 3; .375 INJECTION, POWDER, LYOPHILIZED, FOR SOLUTION INTRAVENOUS at 00:02

## 2020-12-17 NOTE — PROGRESS NOTES
Hospitalist Progress Note  Krish Sullivan MD  Answering service: [phone] from in house phone        Date of Service:  2020  NAME:  Ajit Orona  :  1965  MRN:  [id number]      Admission Summary:      Ajit Orona is a 53 y.o. male who presents with      As per initial admission summary  HPI the patient is a transfer from [facility] for treatment/observation of a traumatic subarachnoid hemorrhage and also treatment of a left leg wound infection.  The patient fell last night around midnight, hitting his head. Ganesh De La Rosa has a laceration to the left parietal area that has been stapled.  The patient complains of a generalized headache which is unchanged since the fall.  He also has a chronic wound to his left leg/foot that appears to be secondarily infected.  The physician at [facility] spoke with the intensivist here and they accepted the patient in transfer.     On my HP. Patient with past medical history significant for chronic left foot ulcers since at least 2018 on chronic methadone and followed by behavioral health clinic history of stroke in  patient was transferred from [facility] as per documentation he does not want to go over there patient had a fall overnight and hit his head there is a laceration to the left parietal area that has been scattered patient complained of generalized headache and also concerned about chronic wound on the left foot patient was initially accepted to the ICU but repeat imaging here at [facility] did not show any bleeding patient does not report any fever, chest pain, shortness of breath or any other associated symptoms. He told me he lives by himself and one of his friend helps him with the dressing of the wound.   As per documentation patient was previously recommended amputation by the plastic surgery but he refused admitted to hospitalist service podiatry and infectious disease consulted       Interval history / Subjective:   Patient seen and examined this afternoon. C/o irritation and itching on the right posterior knee fold. Pain not controlled. C/o arthritic knee pain. Assessment & Plan:     # Traumatic sub arachnoid hamemorrhage, resolved   Repeat CT showed it has been resolved  Neurosurgery signed off  neuro checks for now  Staples need to be removed as per rafaela      # Chronic foot ulcer  H/o Pyoderma gangrenosum   Started on zosyn IV  Prior history of wound culture positive   Podiatry and ID consulted,   Wound care nurse  Wound cultures ordered , pending   Podiatry folllowing. No surgical intervention at this time. Need to switch to oral abs on discharge? Cultures still pending   ID consult pending      # Chronic methadone use  Will continue home dose     # Hypothyroidism  Continue synthroid     # HTN  Continue lisinopril       Code status: full code   DVT prophylaxis: scds    Care Plan discussed with: Patient/Family  Disposition: SNF/LTC and TBD     Hospital Problems  Date Reviewed: 9/9/2020          Codes Class Noted POA    Traumatic subarachnoid hemorrhage ([facility] ICD-10-CM: O79.1M4V  ICD-9-CM: 852.00  12/10/2020 Unknown        Traumatic subarachnoid hemorrhage without loss of consciousness ([facility] ICD-10-CM: P54.0T4J  ICD-9-CM: 852.01  12/10/2020 Yes                Review of Systems:   A comprehensive review of systems was negative except for that written in the HPI. Vital Signs:    Last 24hrs VS reviewed since prior progress note. Most recent are:  Visit Vitals  /66   Pulse 84   Temp 98 °F (36.7 °C)   Resp 17   Ht 6' 0.84\" (1.85 m)   Wt 107.9 kg (237 lb 14 oz)   SpO2 96%   BMI 31.53 kg/m²       No intake or output data in the 24 hours ending 12/17/20 1841     Physical Examination:   I had a face to face encounter with this patient and independently examined them on December 17, 2020 as outlined below:        General:  Alert, cooperative, no distress, appears stated age.    Head: Normocephalic, staples left parietal area    Lungs:   Clear to auscultation bilaterally. Chest wall:  No tenderness or deformity. Heart:  Regular rate and rhythm, S1, S2 normal, no murmur, click, rub or gallop. Abdomen:   Soft, non-tender. Bowel sounds normal. No masses,  No organomegaly. Extremities: Chronic wound left foot , dressing    Pulses: 2+ and symmetric all extremities. Neurologic: CNII-XII intact. Normal strength, sensation and reflexes throughout. Data Review:    Review and/or order of clinical lab test  Review and/or order of tests in the radiology section of CPT  Review and/or order of tests in the medicine section of CPT      Labs:     Recent Labs     12/17/20  0455 12/15/20  0339   WBC 11.3* 11.0   HGB 10.0* 9.4*   HCT 32.7* 30.7*    304     Recent Labs     12/17/20  0455 12/15/20  0339    138   K 4.2 4.2    105   CO2 26 27   BUN 13 12   CREA 0.71 0.75   GLU 97 103*   CA 8.9 8.3*     Recent Labs     12/15/20  0339   ALT 14   *   TBILI 0.2   TP 6.6   ALB 2.9*   GLOB 3.7     No results for input(s): INR, PTP, APTT, INREXT, INREXT in the last 72 hours. No results for input(s): FE, TIBC, PSAT, FERR in the last 72 hours. Lab Results   Component Value Date/Time    Folate 8.6 12/13/2012 03:20 AM      No results for input(s): PH, PCO2, PO2 in the last 72 hours. No results for input(s): CPK, CKNDX, TROIQ in the last 72 hours.     No lab exists for component: CPKMB  Lab Results   Component Value Date/Time    Cholesterol, total 152 12/13/2012 03:30 AM    HDL Cholesterol 26 12/13/2012 03:30 AM    LDL, calculated 92.2 12/13/2012 03:30 AM    Triglyceride 169 (H) 12/13/2012 03:30 AM    CHOL/HDL Ratio 5.8 (H) 12/13/2012 03:30 AM     Lab Results   Component Value Date/Time    Glucose (POC) 150 (H) 03/19/2018 11:45 AM    Glucose (POC) 123 (H) 03/18/2018 08:55 PM    Glucose (POC) 96 03/16/2018 04:58 PM    Glucose (POC) 116 (H) 12/12/2012 12:42 PM    Glucose (POC) 99 12/11/2012 11:27 PM     Lab Results   Component Value Date/Time    Color YELLOW/STRAW 05/20/2019 05:52 AM    Appearance CLEAR 05/20/2019 05:52 AM    Specific gravity 1.011 05/20/2019 05:52 AM    pH (UA) 5.5 05/20/2019 05:52 AM    Protein NEGATIVE  05/20/2019 05:52 AM    Glucose NEGATIVE  05/20/2019 05:52 AM    Ketone NEGATIVE  05/20/2019 05:52 AM    Bilirubin NEGATIVE  05/20/2019 05:52 AM    Urobilinogen 0.2 05/20/2019 05:52 AM    Nitrites NEGATIVE  05/20/2019 05:52 AM    Leukocyte Esterase NEGATIVE  05/20/2019 05:52 AM    Epithelial cells FEW 05/20/2019 05:52 AM    Bacteria NEGATIVE  05/20/2019 05:52 AM    WBC 0-4 05/20/2019 05:52 AM    RBC 0-5 05/20/2019 05:52 AM         Medications Reviewed:     Current Facility-Administered Medications   Medication Dose Route Frequency    clotrimazole-betamethasone (LOTRISONE) 1-0.05 % cream   Topical BID    HYDROcodone-acetaminophen (NORCO) 5-325 mg per tablet 2 Tab  2 Tab Oral Q6H PRN    ibuprofen (MOTRIN) tablet 600 mg  600 mg Oral DAILY    sodium hypochlorite (QUARTER STRENGTH DAKIN'S) 0.125% irrigation (bottle)   Topical DAILY    diphenhydrAMINE (BENADRYL) capsule 25 mg  25 mg Oral Q6H PRN    lidocaine (XYLOCAINE) 4 % cream   Topical PRN    sodium chloride (NS) flush 5-40 mL  5-40 mL IntraVENous Q8H    sodium chloride (NS) flush 5-40 mL  5-40 mL IntraVENous PRN    acetaminophen (TYLENOL) tablet 650 mg  650 mg Oral Q6H PRN    Or    acetaminophen (TYLENOL) suppository 650 mg  650 mg Rectal Q6H PRN    polyethylene glycol (MIRALAX) packet 17 g  17 g Oral DAILY PRN    promethazine (PHENERGAN) tablet 12.5 mg  12.5 mg Oral Q6H PRN    Or    ondansetron (ZOFRAN) injection 4 mg  4 mg IntraVENous Q6H PRN    piperacillin-tazobactam (ZOSYN) 3.375 g in 0.9% sodium chloride (MBP/ADV) 100 mL MBP  3.375 g IntraVENous Q8H    colchicine tablet 0.6 mg  0.6 mg Oral DAILY PRN    gabapentin (NEURONTIN) capsule 100 mg  100 mg Oral TID    levothyroxine (SYNTHROID) tablet 112 mcg 112 mcg Oral ACB    lisinopriL (PRINIVIL, ZESTRIL) tablet 20 mg  20 mg Oral DAILY    melatonin tablet 3 mg  3 mg Oral QHS PRN    methadone (DOLOPHINE) tablet 140 mg  140 mg Oral DAILY    pantoprazole (PROTONIX) tablet 40 mg  40 mg Oral ACB&D    tamsulosin (FLOMAX) capsule 0.4 mg  0.4 mg Oral PCD    allopurinoL (ZYLOPRIM) tablet 100 mg  100 mg Oral DAILY    senna-docusate (PERICOLACE) 8.6-50 mg per tablet 1 Tab  1 Tab Oral DAILY     ______________________________________________________________________  EXPECTED LENGTH OF STAY: 3d 2h  ACTUAL LENGTH OF STAY:          1                 Monica Kent MD

## 2020-12-17 NOTE — PROGRESS NOTES
[facility]-SNF rehab/LTC-pending insurance benefit  RUR-20% Moderate    CM left message with Jair He requesting return phone call to discuss patient's transitional care planing. Joselin Alberto, MS    3:57 pm  CM received phone call from Thelma Simmonds [phone] who identifies herself as patients . She reports that she has known patient for a long time and started helping him out in 2009 with things as much as she can. Rosa Isela Wilkinson is patient's primary medical decision maker. Per Yisel Stephen, she anticipates there will be no success with getting patient into SNF due to methadone needs as this was attempted when patient was at [facility]. Prior to coming into the hospital, the patient was open to [facility] for PT/OT. There was work being done to order patient a wheelchair-Venkata has requested that assistance continue to be provided with getting this set up as he needs one. Patient's phone number was verified as [phone]. The [facility] address is patient's physical address but he is not able to go up stairs which made this discharge location not safe or accessible and therefore arrangements were made for him to go stay at a motel where he received home health and Yisel Stephen checked in multiple times a day. CM to send referrals to [address] [place] [address] [facility] and will continue to follow for transitional care planning needs.      Joselin Alberto, MS

## 2020-12-17 NOTE — PROGRESS NOTES
Physical Therapy Note    Patient declines standing frame at this time due to increased pain. He requests therapy return in about 30 minutes. Will re-attempt as able. Patient reports he is still in too much pain to participate.  Patient requests PT check in with him in the AM.    Thank you,  Jose Oconnell DPT

## 2020-12-17 NOTE — PROGRESS NOTES
Foot and Ankle Surgery Progress Note    Patient: Lora Chu MRN: [id number]  SSN: [id number]    YOB: 1965  Age: 53 y.o. Sex: male      Admit Date: 12/10/2020    * No surgery found *    Procedure:      Subjective:   Pt seen at bedside this AM.  Patient has no complaints. Objective:     Visit Vitals  /65   Pulse 60   Temp 98.6 °F (37 °C)   Resp 18   Wt 107.9 kg (237 lb 14 oz)   SpO2 98%   BMI 31.38 kg/m²       Temp (24hrs), Av.6 °F (37 °C), Min:98.5 °F (36.9 °C), Max:98.6 °F (37 °C)      Physical Exam:    GENERAL: alert, cooperative, no distress  Left foot with dressings in place, c/d/i.     Data Review:   Results     Procedure Component Value Units Date/Time    CULTURE, [place] STAIN [[id number]] Collected: 20 0900    Order Status: Canceled Specimen: Wound from Leg     CULTURE, [place] STAIN [[id number]] Collected: 20 1200    Order Status: Canceled Specimen: Wound from Ankle     CULTURE, BLOOD, PAIRED [[id number]] Collected: 12/10/20 1655    Order Status: Completed Specimen: Blood Updated: 12/15/20 0541     Special Requests: NO SPECIAL REQUESTS        Culture result: NO GROWTH 5 DAYS       CULTURE, [place] STAIN [[id number]] Collected: 12/10/20 1445    Order Status: Canceled Specimen: Wound from Foot         Culture:  Order Information    Order Date/Time Release Date/Time Start Date/Time End Date/Time   20 08:54 AM 20 08:54 AM 20 09:00 AM 20 09:00 AM   CSN:   [id number]   Order Details    Frequency Duration Priority Order Class   ONE TIME 1  occurrence Routine Lab Collect   Reprint OrderRequisition - Outpatient Only    CULTURE, WOUND Rey Brim (Order #[id number]) on 20   This Order Has Been Canceled    Order Status Reason By On   Canceled Other Jose, Lab In [facility] 20 0408   Cancelled by lab, specimen was not received          Lab Review:   BMP:   Lab Results   Component Value Date/Time     2020 04:55 AM    K 4.2 2020 04:55 AM     2020 04:55 AM    CO2 26 2020 04:55 AM    AGAP 6 2020 04:55 AM    GLU 97 2020 04:55 AM    BUN 13 2020 04:55 AM    CREA 0.71 2020 04:55 AM    GFRAA >60 2020 04:55 AM    GFRNA >60 2020 04:55 AM     CBC:   Lab Results   Component Value Date/Time    WBC 11.3 (H) 2020 04:55 AM    HGB 10.0 (L) 2020 04:55 AM    HCT 32.7 (L) 2020 04:55 AM     2020 04:55 AM       Assessment:     Hospital Problems  Date Reviewed: 2020          Codes Class Noted POA    Traumatic subarachnoid hemorrhage ([facility] ICD-10-CM: I54.6O1D  ICD-9-CM: 852.00  12/10/2020 Unknown        Traumatic subarachnoid hemorrhage without loss of consciousness ([facility] ICD-10-CM: N75.7S4D  ICD-9-CM: 852.01  12/10/2020 Yes              Plan/Recommendations/Medical Decision MakinM with PMHx of chronic methadone use, CVA  (left side affected), with 2 year history of left foot and ankle wounds likely pyoderma gangrenosum with mainly fibrotic base and drainage. Dressing changed today, . Aqua green drainage on dressing of ankle wound. Culture taken. Wound is likely infected, however there is likely an ongoing inflammation process due to pyoderma contributing to presentation. Since there is no active purulence, leukocytosis trending down (11.3 < 11.0< 11.2 < 11.5<11.4<12.7), and patient refuses surgical debridement due to directions from dermatologist, surgical debridement would provide minimal benefit. Wounds are likely to enlarge due to debridement. Recommend local wound care.     Previous discussion between patient and POA regarding transition of care to [facility]) vs continuing local wound care with already established dermatologist. Patient has decided to continue local wound care with dermatologist until he is able to get his personal affairs in order and then be evaluated as outpatient at ED [facility]. Pt declines any surgical intervention during this admission.      I have contacted [facility] and with patient's consent, he will be contacted to set up an outpatient appointment at his convenience. Unfortunately, culture collected prior to starting Dakin's solution dressing was not sent by nursing staff. However, ID has made recommendations based on previous cultures.      Plan: local wound care. Continue abx. Wound care orders in place. Dressing change issues, would recommend wound care nursing for dressing changes. Pt for wheelchair need eval.  Foot and ankle surgery signing off at this time. Please re-consult if any additional questions or issues arise. Abx: zosyn  Cx: pending 12/13/20  Dsg: lidocaine topical + NS WTD + DSD. WBS: WBAT     Dispo: per primary care.      Signed By: Irlanda Mcghee DPM     December 17, 2020

## 2020-12-17 NOTE — PROGRESS NOTES
Occupational Therapy  Attempted to see for OT, on arrival patient sitting edge of bed at angle to right and with decreased support, instructed to scoot back onto bed and align hips to increase support. Attempted to encourage participation in ADL tasks or UE strengthening however patient declined as he is in pain and waiting for wound care to come change his dressing. Informed therapist he would do more tomorrow as he needs to walk. Will follow up tomorrow.    Natalya Cleveland, OTR/L

## 2020-12-18 LAB
ANION GAP SERPL CALC-SCNC: 5 MMOL/L (ref 5–15)
BASOPHILS # BLD: 0.1 K/UL (ref 0–0.1)
BASOPHILS NFR BLD: 1 % (ref 0–1)
BUN SERPL-MCNC: 14 MG/DL (ref 6–20)
BUN/CREAT SERPL: 19 (ref 12–20)
CALCIUM SERPL-MCNC: 8.6 MG/DL (ref 8.5–10.1)
CHLORIDE SERPL-SCNC: 106 MMOL/L (ref 97–108)
CO2 SERPL-SCNC: 28 MMOL/L (ref 21–32)
CREAT SERPL-MCNC: 0.74 MG/DL (ref 0.7–1.3)
DIFFERENTIAL METHOD BLD: ABNORMAL
EOSINOPHIL # BLD: 0.9 K/UL (ref 0–0.4)
EOSINOPHIL NFR BLD: 8 % (ref 0–7)
ERYTHROCYTE [DISTWIDTH] IN BLOOD BY AUTOMATED COUNT: 15.8 % (ref 11.5–14.5)
GLUCOSE SERPL-MCNC: 98 MG/DL (ref 65–100)
HCT VFR BLD AUTO: 32.3 % (ref 36.6–50.3)
HGB BLD-MCNC: 9.8 G/DL (ref 12.1–17)
IMM GRANULOCYTES # BLD AUTO: 0.1 K/UL (ref 0–0.04)
IMM GRANULOCYTES NFR BLD AUTO: 1 % (ref 0–0.5)
LYMPHOCYTES # BLD: 3.1 K/UL (ref 0.8–3.5)
LYMPHOCYTES NFR BLD: 25 % (ref 12–49)
MCH RBC QN AUTO: 25.1 PG (ref 26–34)
MCHC RBC AUTO-ENTMCNC: 30.3 G/DL (ref 30–36.5)
MCV RBC AUTO: 82.6 FL (ref 80–99)
MONOCYTES # BLD: 1.1 K/UL (ref 0–1)
MONOCYTES NFR BLD: 9 % (ref 5–13)
NEUTS SEG # BLD: 7 K/UL (ref 1.8–8)
NEUTS SEG NFR BLD: 56 % (ref 32–75)
NRBC # BLD: 0 K/UL (ref 0–0.01)
NRBC BLD-RTO: 0 PER 100 WBC
PLATELET # BLD AUTO: 311 K/UL (ref 150–400)
PMV BLD AUTO: 9.9 FL (ref 8.9–12.9)
POTASSIUM SERPL-SCNC: 4.1 MMOL/L (ref 3.5–5.1)
RBC # BLD AUTO: 3.91 M/UL (ref 4.1–5.7)
SODIUM SERPL-SCNC: 139 MMOL/L (ref 136–145)
WBC # BLD AUTO: 12.2 K/UL (ref 4.1–11.1)

## 2020-12-18 PROCEDURE — 2709999900 HC NON-CHARGEABLE SUPPLY

## 2020-12-18 PROCEDURE — 97530 THERAPEUTIC ACTIVITIES: CPT

## 2020-12-18 PROCEDURE — 85025 COMPLETE CBC W/AUTO DIFF WBC: CPT

## 2020-12-18 PROCEDURE — 99218 HC RM OBSERVATION: CPT

## 2020-12-18 PROCEDURE — 97535 SELF CARE MNGMENT TRAINING: CPT

## 2020-12-18 PROCEDURE — 36415 COLL VENOUS BLD VENIPUNCTURE: CPT

## 2020-12-18 PROCEDURE — 97602 WOUND(S) CARE NON-SELECTIVE: CPT

## 2020-12-18 PROCEDURE — 80048 BASIC METABOLIC PNL TOTAL CA: CPT

## 2020-12-18 PROCEDURE — 74011250637 HC RX REV CODE- 250/637: Performed by: INTERNAL MEDICINE

## 2020-12-18 RX ADMIN — LEVOTHYROXINE SODIUM 112 MCG: 0.11 TABLET ORAL at 07:03

## 2020-12-18 RX ADMIN — CLOTRIMAZOLE AND BETAMETHASONE DIPROPIONATE: 10; .5 CREAM TOPICAL at 10:45

## 2020-12-18 RX ADMIN — PANTOPRAZOLE SODIUM 40 MG: 40 TABLET, DELAYED RELEASE ORAL at 15:37

## 2020-12-18 RX ADMIN — GABAPENTIN 100 MG: 100 CAPSULE ORAL at 10:44

## 2020-12-18 RX ADMIN — HYDROCODONE BITARTRATE AND ACETAMINOPHEN 2 TABLET: 5; 325 TABLET ORAL at 13:37

## 2020-12-18 RX ADMIN — LISINOPRIL 20 MG: 20 TABLET ORAL at 10:44

## 2020-12-18 RX ADMIN — GABAPENTIN 100 MG: 100 CAPSULE ORAL at 15:37

## 2020-12-18 RX ADMIN — HYDROCODONE BITARTRATE AND ACETAMINOPHEN 2 TABLET: 5; 325 TABLET ORAL at 20:42

## 2020-12-18 RX ADMIN — HYDROCODONE BITARTRATE AND ACETAMINOPHEN 2 TABLET: 5; 325 TABLET ORAL at 04:44

## 2020-12-18 RX ADMIN — DOCUSATE SODIUM 50 MG AND SENNOSIDES 8.6 MG 1 TABLET: 8.6; 5 TABLET, FILM COATED ORAL at 10:44

## 2020-12-18 RX ADMIN — PANTOPRAZOLE SODIUM 40 MG: 40 TABLET, DELAYED RELEASE ORAL at 07:03

## 2020-12-18 RX ADMIN — Medication 10 ML: at 22:57

## 2020-12-18 RX ADMIN — GABAPENTIN 100 MG: 100 CAPSULE ORAL at 22:57

## 2020-12-18 RX ADMIN — Medication 10 ML: at 14:48

## 2020-12-18 RX ADMIN — IBUPROFEN 600 MG: 600 TABLET, FILM COATED ORAL at 10:44

## 2020-12-18 RX ADMIN — DAKIN'S SOLUTION 0.125% (QUARTER STRENGTH): 0.12 SOLUTION at 10:45

## 2020-12-18 RX ADMIN — ALLOPURINOL 100 MG: 100 TABLET ORAL at 10:44

## 2020-12-18 RX ADMIN — ACETAMINOPHEN 650 MG: 325 TABLET ORAL at 16:17

## 2020-12-18 RX ADMIN — METHADONE HYDROCHLORIDE 140 MG: 10 TABLET ORAL at 10:44

## 2020-12-18 RX ADMIN — CLOTRIMAZOLE AND BETAMETHASONE DIPROPIONATE: 10; .5 CREAM TOPICAL at 17:23

## 2020-12-18 RX ADMIN — TAMSULOSIN HYDROCHLORIDE 0.4 MG: 0.4 CAPSULE ORAL at 17:22

## 2020-12-18 NOTE — PROGRESS NOTES
Problem: Self Care Deficits Care Plan (Adult)  Goal: *Acute Goals and Plan of Care (Insert Text)  Description: FUNCTIONAL STATUS PRIOR TO ADMISSION: Very poor historian. Reports that he was living at a hotel? And then home alone and staying on the 1st floor with use of old w/c from a wound clinic? Essentially unable to get up from the couch. Per chart, at last recent admission, a L BKA was recommended and he refused; was to be NWB but also refused/was unable to maintain that status. HOME SUPPORT PRIOR TO ADMISSION: Details unknown - very poor historian but sounds like he had very limited assistance; per chart lives with friend/mPOA who provides assistance. Occupational Therapy Goals  Initiated 12/11/2020  1. Patient will perform grooming, seated EOB, with supervision/set-up using compensatory strategies PRN within 7 day(s). GOAL MET  2. Patient will perform upper body dressing with supervision/set-up using noe technique within 7 day(s). CONT  3. Patient will perform lower body dressing with minimal assistance using AE/compensatory strategies PRN within 7 day(s). CONT  4. Patient will perform lateral t/f to [facility] with maximal assistance within 7 day(s). CONT  5. Patient will perform all aspects of toileting with minimal assistance/contact guard assist within 7 day(s). CONT  6. Patient will participate in upper extremity therapeutic exercise/activities with supervision/set-up for 5 minutes within 7 day(s). CONT  7. Patient will utilize energy conservation techniques during functional activities with verbal cues within 7 day(s).  CONT        12/18/2020 1543 by Maci Pod, OT  Outcome: Not Met  12/18/2020 1542 by Maci Pod, OT  Outcome: Not Met   OCCUPATIONAL THERAPY TREATMENT/WEEKLY RE-EVALUATION  Patient: Alla Varela (2 y.o. male)  Date: 12/18/2020  Diagnosis: SAH (subarachnoid hemorrhage) ([facility]) [I60.9]  [facility]achnoid hemorrhage) ([facility] [I60.9] <principal problem not specified>       Precautions:    Chart, occupational therapy assessment, plan of care, and goals were reviewed. ASSESSMENT  Based on the objective data described below with slow progression towards OT goals. Transfer training with mod Ax2 to patient's R side(strong side) for lateral transfer. Patient adamant about walking and said he was walking approx 3 weeks ago. No LLE WB orders in chart, educated on limited WB. Completed sit > stand x 2 trials with max-total Ax2 to attempt and unable to achieve full standing. R knee with limited full extension. Patient needing extensive assistance with OOB mobility and self care at this time. He has met one goal to complete grooming sitting at EOB. All other goals continued. Patient continues to benefit from skilled OT services. Recommend SNF for additional rehab. Current Level of Function Impacting Discharge (ADLs): self care transfer lateral mod AX2, cast shoe max A, R shoe min A    Other factors to consider for discharge: Patient with L hemiplegia at baseline, now with LLE proximal ulcer and SAH. He is impulsive and questionable insight to deficits. PLAN :  Goals have been updated based on progression since last assessment. Patient continues to benefit from skilled intervention to address the above impairments. Continue to follow patient 4 times a week to address goals.     Recommend with staff: self care transfers    Recommend next OT session: RUE exercises, toileting transfer to [facility] with x2 assist    Recommendation for discharge: (in order for the patient to meet his/her long term goals)  Therapy up to 5 days/week in SNF setting    This discharge recommendation:  Has been made in collaboration with the attending provider and/or case management    Equipment recommendations for successful discharge (if) home: TBD with progression and dc plans---if home need hospital bed, Myriam lift, marline       SUBJECTIVE:   Patient stated Tomasz Love never told me I couldn't walk.    OBJECTIVE DATA SUMMARY:   Cognitive/Behavioral Status:                      Functional Mobility and Transfers for ADLs:  Bed Mobility:  Supine to Sit: Stand-by assistance  Scooting: Stand-by assistance    Transfers:  Sit to Stand: Maximum assistance; Total assistance;Assist x2; Additional time; Adaptive equipment     Bed to Chair: Moderate assistance;Assist x2    Balance:  Sitting: Intact  Standing: Impaired; With support;Pull to stand    ADL Intervention:     Patient instructed and indicated understanding the benefits of maintaining activity tolerance, functional mobility, and independence with self care tasks during acute stay  to ensure safe return home and to baseline. Encouraged patient to increase frequency and duration OOB, be out of bed for all meals, perform daily ADLs (as approved by RN/MD regarding bathing etc), and performing functional mobility to/from [facility]. Lower Body Dressing Assistance  Shoes with Velcro: Maximum assistance(L foot- cast shoe)  Shoes with Cloth Laces: Minimum assistance(R foot)  Limited secondary to L UE hemiplegia.             Pain:  9/10 in B knees-agreeable to therapy and nursing aware and gave pain meds at beginning of session    Activity Tolerance:   Poor    After treatment patient left in no apparent distress:   Sitting in chair and Call bell within reach    COMMUNICATION/COLLABORATION:   The patients plan of care was discussed with: Physical Therapist and Registered Nurse    Lynn Angulo OT  Time Calculation: 15 mins

## 2020-12-18 NOTE — PROGRESS NOTES
Hospitalist Progress Note  Josie Feliz MD  Answering service: [phone] from in house phone        Date of Service:  2020  NAME:  Patrick Shine  :  1965  MRN:  [id number]      Admission Summary:      Patrick Shine is a 53 y.o. male who presents with      As per initial admission summary  HPI the patient is a transfer from [facility] for treatment/observation of a traumatic subarachnoid hemorrhage and also treatment of a left leg wound infection.  The patient fell last night around midnight, hitting his head. Tiffany Lowe has a laceration to the left parietal area that has been stapled.  The patient complains of a generalized headache which is unchanged since the fall.  He also has a chronic wound to his left leg/foot that appears to be secondarily infected.  The physician at [facility] spoke with the intensivist here and they accepted the patient in transfer.     On my HP. Patient with past medical history significant for chronic left foot ulcers since at least 2018 on chronic methadone and followed by behavioral health clinic history of stroke in  patient was transferred from [facility] as per documentation he does not want to go over there patient had a fall overnight and hit his head there is a laceration to the left parietal area that has been scattered patient complained of generalized headache and also concerned about chronic wound on the left foot patient was initially accepted to the ICU but repeat imaging here at [facility] did not show any bleeding patient does not report any fever, chest pain, shortness of breath or any other associated symptoms. He told me he lives by himself and one of his friend helps him with the dressing of the wound.   As per documentation patient was previously recommended amputation by the plastic surgery but he refused admitted to hospitalist service podiatry and infectious disease consulted       Interval history / Subjective:   Patient seen sleeping. Second attempt was also made. No event reported. He has completed IV antibiotics     Assessment & Plan:     # Traumatic sub arachnoid hamemorrhage, resolved   Repeat CT showed it has been resolved  Neurosurgery signed off  neuro checks for now  Staples need to be removed as per rafaela      # Chronic foot ulcer  H/o Pyoderma gangrenosum   On IV Zosyn--- completed on 12/17   Prior history of wound culture positive   Podiatry and ID consulted,   Wound care following   Wound cultures ordered , pending   Podiatry folllowing. No surgical intervention at this time. Need to switch to oral abs on discharge? Cultures still pending   ID followed, appt input      # Chronic methadone use  Will continue home dose     # Hypothyroidism  Continue synthroid     # HTN  Continue lisinopril       Code status: full code   DVT prophylaxis: scds    Care Plan discussed with: Patient/Family  Disposition: SNF/LTC and TBD     Hospital Problems  Date Reviewed: 9/9/2020          Codes Class Noted POA    Traumatic subarachnoid hemorrhage ([facility] ICD-10-CM: Q38.4M4B  ICD-9-CM: 852.00  12/10/2020 Unknown        Traumatic subarachnoid hemorrhage without loss of consciousness ([facility] ICD-10-CM: G52.1B6H  ICD-9-CM: 852.01  12/10/2020 Yes                Review of Systems:   A comprehensive review of systems was negative except for that written in the HPI. Vital Signs:    Last 24hrs VS reviewed since prior progress note.  Most recent are:  Visit Vitals  /77 (BP 1 Location: Right arm, BP Patient Position: At rest;Sitting)   Pulse 78   Temp 97.7 °F (36.5 °C)   Resp 18   Ht 6' 0.84\" (1.85 m)   Wt 107.9 kg (237 lb 14 oz)   SpO2 97%   BMI 31.53 kg/m²       No intake or output data in the 24 hours ending 12/18/20 3653     Physical Examination:   I had a face to face encounter with this patient and independently examined them on December 18, 2020 as outlined below:        General:  Alert, cooperative, no distress, appears stated age. Head:  Normocephalic, staples left parietal area    Lungs:   Clear to auscultation bilaterally. Chest wall:  No tenderness or deformity. Heart:  Regular rate and rhythm, S1, S2 normal, no murmur, click, rub or gallop. Abdomen:   Soft, non-tender. Bowel sounds normal. No masses,  No organomegaly. Extremities: Chronic wound left foot , dressing    Pulses: 2+ and symmetric all extremities. Neurologic: CNII-XII intact. Normal strength, sensation and reflexes throughout. Data Review:   I personally reviewed labs and imaging       Labs:     Recent Labs     12/18/20 0448 12/17/20 0455   WBC 12.2* 11.3*   HGB 9.8* 10.0*   HCT 32.3* 32.7*    316     Recent Labs     12/18/20 0448 12/17/20 0455    137   K 4.1 4.2    105   CO2 28 26   BUN 14 13   CREA 0.74 0.71   GLU 98 97   CA 8.6 8.9     No results for input(s): ALT, AP, TBIL, TBILI, TP, ALB, GLOB, GGT, AML, LPSE in the last 72 hours. No lab exists for component: SGOT, GPT, AMYP, HLPSE  No results for input(s): INR, PTP, APTT, INREXT, INREXT in the last 72 hours. No results for input(s): FE, TIBC, PSAT, FERR in the last 72 hours. Lab Results   Component Value Date/Time    Folate 8.6 12/13/2012 03:20 AM      No results for input(s): PH, PCO2, PO2 in the last 72 hours. No results for input(s): CPK, CKNDX, TROIQ in the last 72 hours.     No lab exists for component: CPKMB  Lab Results   Component Value Date/Time    Cholesterol, total 152 12/13/2012 03:30 AM    HDL Cholesterol 26 12/13/2012 03:30 AM    LDL, calculated 92.2 12/13/2012 03:30 AM    Triglyceride 169 (H) 12/13/2012 03:30 AM    CHOL/HDL Ratio 5.8 (H) 12/13/2012 03:30 AM     Lab Results   Component Value Date/Time    Glucose (POC) 150 (H) 03/19/2018 11:45 AM    Glucose (POC) 123 (H) 03/18/2018 08:55 PM    Glucose (POC) 96 03/16/2018 04:58 PM    Glucose (POC) 116 (H) 12/12/2012 12:42 PM    Glucose (POC) 99 12/11/2012 11:27 PM     Lab Results   Component Value Date/Time    Color YELLOW/STRAW 05/20/2019 05:52 AM    Appearance CLEAR 05/20/2019 05:52 AM    Specific gravity 1.011 05/20/2019 05:52 AM    pH (UA) 5.5 05/20/2019 05:52 AM    Protein NEGATIVE  05/20/2019 05:52 AM    Glucose NEGATIVE  05/20/2019 05:52 AM    Ketone NEGATIVE  05/20/2019 05:52 AM    Bilirubin NEGATIVE  05/20/2019 05:52 AM    Urobilinogen 0.2 05/20/2019 05:52 AM    Nitrites NEGATIVE  05/20/2019 05:52 AM    Leukocyte Esterase NEGATIVE  05/20/2019 05:52 AM    Epithelial cells FEW 05/20/2019 05:52 AM    Bacteria NEGATIVE  05/20/2019 05:52 AM    WBC 0-4 05/20/2019 05:52 AM    RBC 0-5 05/20/2019 05:52 AM         Medications Reviewed:     Current Facility-Administered Medications   Medication Dose Route Frequency    clotrimazole-betamethasone (LOTRISONE) 1-0.05 % cream   Topical BID    HYDROcodone-acetaminophen (NORCO) 5-325 mg per tablet 2 Tab  2 Tab Oral Q6H PRN    ibuprofen (MOTRIN) tablet 600 mg  600 mg Oral DAILY    sodium hypochlorite (QUARTER STRENGTH DAKIN'S) 0.125% irrigation (bottle)   Topical DAILY    diphenhydrAMINE (BENADRYL) capsule 25 mg  25 mg Oral Q6H PRN    lidocaine (XYLOCAINE) 4 % cream   Topical PRN    sodium chloride (NS) flush 5-40 mL  5-40 mL IntraVENous Q8H    sodium chloride (NS) flush 5-40 mL  5-40 mL IntraVENous PRN    acetaminophen (TYLENOL) tablet 650 mg  650 mg Oral Q6H PRN    Or    acetaminophen (TYLENOL) suppository 650 mg  650 mg Rectal Q6H PRN    polyethylene glycol (MIRALAX) packet 17 g  17 g Oral DAILY PRN    promethazine (PHENERGAN) tablet 12.5 mg  12.5 mg Oral Q6H PRN    Or    ondansetron (ZOFRAN) injection 4 mg  4 mg IntraVENous Q6H PRN    colchicine tablet 0.6 mg  0.6 mg Oral DAILY PRN    gabapentin (NEURONTIN) capsule 100 mg  100 mg Oral TID    levothyroxine (SYNTHROID) tablet 112 mcg  112 mcg Oral ACB    lisinopriL (PRINIVIL, ZESTRIL) tablet 20 mg  20 mg Oral DAILY    melatonin tablet 3 mg  3 mg Oral QHS PRN    methadone (DOLOPHINE) tablet 140 mg  140 mg Oral DAILY    pantoprazole (PROTONIX) tablet 40 mg  40 mg Oral ACB&D    tamsulosin (FLOMAX) capsule 0.4 mg  0.4 mg Oral PCD    allopurinoL (ZYLOPRIM) tablet 100 mg  100 mg Oral DAILY    senna-docusate (PERICOLACE) 8.6-50 mg per tablet 1 Tab  1 Tab Oral DAILY     ______________________________________________________________________  EXPECTED LENGTH OF STAY: 3d 2h  ACTUAL LENGTH OF STAY:          1                 Monica Kent MD

## 2020-12-18 NOTE — PROGRESS NOTES
Problem: Mobility Impaired (Adult and Pediatric)  Goal: *Acute Goals and Plan of Care (Insert Text)  Description: FUNCTIONAL STATUS PRIOR TO ADMISSION: Very poor historian. Reports that he was living at a hotel? And then home alone and staying on the 1st floor. Essentially unable to get up from the couch. Per chart, at last recent admission, a L BKA was recommended and he refused; was to be NWB but also refused/was unable to maintain that status    HOME SUPPORT PRIOR TO ADMISSION: Details unknown - very poor historian but sounds like he had very limited assistance     Physical Therapy Goals  Initiated 12/11/2020  1. Patient will move from supine to sit and sit to supine  and scoot up and down in bed with independence within 7 day(s). 2.  Patient will transfer from bed to chair and chair to bed with supervision/set-up using the least restrictive device within 7 day(s). 3.  Patient will perform lateral/scoot transfer to wheelchair vs NWB stand pivot to wheelchair with min A within 7 days. Outcome: Progressing Towards Goal    PHYSICAL THERAPY TREATMENT  Patient: Ajit Orona (34 y.o. male)  Date: 12/18/2020  Diagnosis: SAH (subarachnoid hemorrhage) ([facility]) [I60.9]  [facility] [I60.9] <principal problem not specified>       Precautions:    Chart, physical therapy assessment, plan of care and goals were reviewed. ASSESSMENT  Patient continues with skilled PT services and is progressing towards goals. Pt tolerates mod Ax2 downhill lateral transfer to chair with cues for proper WB through R foot. Pt insistent on performing transfer to Edge of chair, encouraged to sit back, tends to have WB through R hip and L hip off the edge of the chair. Pt wishes to attempt standing, educated pt to perform as little weight bearing through L foot as possible. Pt requires max-total A to attempt standing, R knee blocking with significant crepitace, c/o pain.  Attempted twice and abandoned attempts with pt knee ext lacking approx 20 degrees. Pt educated regarding LAQ, increasing ROM with quad sets (observable ms fasciculation with sets) and hip flexion to increase mobility. Will continue to follow. Current Level of Function Impacting Discharge (mobility/balance): max A-mod A overal    Other factors to consider for discharge: pt has significant gangrenous issues to L LE         PLAN :  Patient continues to benefit from skilled intervention to address the above impairments. Continue treatment per established plan of care. to address goals. Recommendation for discharge: (in order for the patient to meet his/her long term goals)  Therapy up to 5 days/week in SNF setting, if this is not possible DC home with [facility] and aides    This discharge recommendation:  Has been made in collaboration with the attending provider and/or case management    IF patient discharges home will need the following DME: rolling walker, sliding/transfer board, and to be determined (TBD)       SUBJECTIVE:   Patient stated i need my pain meds.  Pt reports 9/10 pain to RN, c/o knee pain. Pt states pain in knees worse post transfer and \"it didn't bother me before but now its killing me\" at lateral patellar area. OBJECTIVE DATA SUMMARY:   Critical Behavior:  Neurologic State: Alert  Orientation Level: Oriented X4  Cognition: Appropriate decision making, Appropriate for age attention/concentration, Appropriate safety awareness  Safety/Judgement: Decreased insight into deficits, Decreased awareness of need for safety, Decreased awareness of need for assistance, Fall prevention  Functional Mobility Training:  Bed Mobility:     Supine to Sit: Stand-by assistance     Scooting: Stand-by assistance        Transfers:  Sit to Stand: Maximum assistance; Total assistance;Assist x2; Additional time; Adaptive equipment  Stand to Sit: Maximum assistance;Assist x2; Additional time        Bed to Chair: Moderate assistance;Assist x2 Balance:  Sitting: Intact  Standing: Impaired; With support;Pull to stand  Ambulation/Gait Training:                           Therapeutic Exercises:   QS with leg straight, hip flexion, LAQ 2x10 R  Pain Rating:  10/10 then 9/10 with RN, 10/10 later with PT    Activity Tolerance:   Fair, Poor, and requires frequent rest breaks    After treatment patient left in no apparent distress:   Call bell within reach and Caregiver / family present    COMMUNICATION/COLLABORATION:   The patients plan of care was discussed with: Registered nurse and Case management.      Tristen Vaughn, PT   Time Calculation: 30 mins

## 2020-12-18 NOTE — PROGRESS NOTES
Coordinated time around pain medication administration to perform wound care. When attempting to change dressing, patient refused, deferring to a time \"maybe next pain pill. \"

## 2020-12-18 NOTE — PROGRESS NOTES
Patient in no acute distress. Wound care performed by wound nurse today. Patient called throughout the day for various things. Pain managed by scheduled Methadone and PRN Norco. Patient c/o itching to back and received lotion for dry skin. Patient noted to be scratching his back with a knife and fork. Strongly encouraged patient not to in order to prevent injury and infection (noted to have scratches and bleeding). Needs reinforcement. Patient also asking to speak with his doctor. Paged @ 15:20. Nursing will continue to monitor.

## 2020-12-18 NOTE — WOUND CARE
WOCN Note:      Follow up visit for dressing change. Assessed [id number]. PPE: mask, goggles and gloves.     Chart reviewed. Admitted DX:  SAH (subarachnoid hemorrhage)          Past Medical History:   Diagnosis Date    Aneurysm ([address]       (with stroke)    Bladder tumor      Cancer ([address]       bladder CA    Chronic pain      Family history of bladder cancer      GERD (gastroesophageal reflux disease)      Gout      History of vascular access device 04/25/2019     [facility] VAT 4 FR MIDLINE R Cephalic Limited access    History of vascular access device 04/26/2019     4 fr Midline right Cephalic midline access    Hypercholesterolemia      Hypertension      Lesion of bladder      Seizures ([address]      Stroke ([address] 2006     left sided weakness    Thromboembolus ([address]       left leg    Thyroid disease      TIA (transient ischemic attack) 2012      Assessment:   Patient is A&O x 3, communicative, continent and mobile independently in bed. Bed:  [place]  Patient reports discomfort with wound care to left lower extremity. Right heel intact without erythema.      1. POA Left lower dorsal foot to toes and circumferential lower leg, suspected pyoderma gangrenosum per podiatry note:  95% moist tan and brown 5% moist pink; moderate serous exudate; no odor; no erythema.  Tender to touch.  Patient reports that it has been debrided many times and can't be debrided anymore.     pics from chart media                      2.  Head laceration from fall prior to admit:  Approximated with staples.  No drainage.     Treatment:  Removed ace wrap; soaked dressing in saline; applied lidocaine cream; applied adaptic and Dakins moist gauze to the wound bed; covered with dry gauze, abd pads; wrapped with 2 kerlix and ace wrap.     Wound, Pressure Prevention & Skin Care Recommendations:    1. Minimize layers of linen/pads under patient to optimize support surface.    2.  Turn/reposition approximately every 2 hours and offload heels. 3.  Manage moisture/ Keep skin folds clean and dry.   4.  Left lower extremity wound care per podiatry orders.     Discussed above plan with patient and RN.     Transition of Care: Plan to follow weekly while admitted.     AIXA Yu RN [facility] Inpatient Wound Care  Available on [employer]  Pager [phone]  Office [phone]

## 2020-12-18 NOTE — PROGRESS NOTES
Occupational Therapy    Attempted OT session. Wound care present for dressing change. Will continue to follow for OT services.        Thank you,    Ajith Robles, OT

## 2020-12-19 ENCOUNTER — APPOINTMENT (OUTPATIENT)
Dept: GENERAL RADIOLOGY | Age: 55
End: 2020-12-19
Attending: INTERNAL MEDICINE
Payer: COMMERCIAL

## 2020-12-19 LAB
ANION GAP SERPL CALC-SCNC: 5 MMOL/L (ref 5–15)
BASOPHILS # BLD: 0.1 K/UL (ref 0–0.1)
BASOPHILS NFR BLD: 1 % (ref 0–1)
BUN SERPL-MCNC: 16 MG/DL (ref 6–20)
BUN/CREAT SERPL: 23 (ref 12–20)
CALCIUM SERPL-MCNC: 8.7 MG/DL (ref 8.5–10.1)
CHLORIDE SERPL-SCNC: 104 MMOL/L (ref 97–108)
CO2 SERPL-SCNC: 28 MMOL/L (ref 21–32)
CREAT SERPL-MCNC: 0.69 MG/DL (ref 0.7–1.3)
DIFFERENTIAL METHOD BLD: ABNORMAL
EOSINOPHIL # BLD: 1 K/UL (ref 0–0.4)
EOSINOPHIL NFR BLD: 8 % (ref 0–7)
ERYTHROCYTE [DISTWIDTH] IN BLOOD BY AUTOMATED COUNT: 15.9 % (ref 11.5–14.5)
GLUCOSE SERPL-MCNC: 113 MG/DL (ref 65–100)
HCT VFR BLD AUTO: 30.5 % (ref 36.6–50.3)
HGB BLD-MCNC: 9.4 G/DL (ref 12.1–17)
IMM GRANULOCYTES # BLD AUTO: 0.1 K/UL (ref 0–0.04)
IMM GRANULOCYTES NFR BLD AUTO: 1 % (ref 0–0.5)
LYMPHOCYTES # BLD: 2.8 K/UL (ref 0.8–3.5)
LYMPHOCYTES NFR BLD: 22 % (ref 12–49)
MCH RBC QN AUTO: 25.3 PG (ref 26–34)
MCHC RBC AUTO-ENTMCNC: 30.8 G/DL (ref 30–36.5)
MCV RBC AUTO: 82.2 FL (ref 80–99)
MONOCYTES # BLD: 1.2 K/UL (ref 0–1)
MONOCYTES NFR BLD: 10 % (ref 5–13)
NEUTS SEG # BLD: 7.3 K/UL (ref 1.8–8)
NEUTS SEG NFR BLD: 58 % (ref 32–75)
NRBC # BLD: 0 K/UL (ref 0–0.01)
NRBC BLD-RTO: 0 PER 100 WBC
PLATELET # BLD AUTO: 316 K/UL (ref 150–400)
PMV BLD AUTO: 10.2 FL (ref 8.9–12.9)
POTASSIUM SERPL-SCNC: 4 MMOL/L (ref 3.5–5.1)
RBC # BLD AUTO: 3.71 M/UL (ref 4.1–5.7)
SODIUM SERPL-SCNC: 137 MMOL/L (ref 136–145)
WBC # BLD AUTO: 12.4 K/UL (ref 4.1–11.1)

## 2020-12-19 PROCEDURE — 74011250637 HC RX REV CODE- 250/637: Performed by: INTERNAL MEDICINE

## 2020-12-19 PROCEDURE — 80048 BASIC METABOLIC PNL TOTAL CA: CPT

## 2020-12-19 PROCEDURE — 85025 COMPLETE CBC W/AUTO DIFF WBC: CPT

## 2020-12-19 PROCEDURE — 36415 COLL VENOUS BLD VENIPUNCTURE: CPT

## 2020-12-19 PROCEDURE — 73562 X-RAY EXAM OF KNEE 3: CPT

## 2020-12-19 PROCEDURE — 99218 HC RM OBSERVATION: CPT

## 2020-12-19 RX ORDER — TRAMADOL HYDROCHLORIDE 50 MG/1
50 TABLET ORAL
Status: DISCONTINUED | OUTPATIENT
Start: 2020-12-19 | End: 2020-12-22 | Stop reason: HOSPADM

## 2020-12-19 RX ADMIN — PANTOPRAZOLE SODIUM 40 MG: 40 TABLET, DELAYED RELEASE ORAL at 17:22

## 2020-12-19 RX ADMIN — METHADONE HYDROCHLORIDE 140 MG: 10 TABLET ORAL at 09:18

## 2020-12-19 RX ADMIN — Medication 3 MG: at 01:21

## 2020-12-19 RX ADMIN — ALLOPURINOL 100 MG: 100 TABLET ORAL at 09:18

## 2020-12-19 RX ADMIN — TRAMADOL HYDROCHLORIDE 50 MG: 50 TABLET, FILM COATED ORAL at 12:22

## 2020-12-19 RX ADMIN — TAMSULOSIN HYDROCHLORIDE 0.4 MG: 0.4 CAPSULE ORAL at 17:22

## 2020-12-19 RX ADMIN — GABAPENTIN 100 MG: 100 CAPSULE ORAL at 09:18

## 2020-12-19 RX ADMIN — Medication 10 ML: at 15:02

## 2020-12-19 RX ADMIN — Medication 10 ML: at 08:44

## 2020-12-19 RX ADMIN — IBUPROFEN 600 MG: 600 TABLET, FILM COATED ORAL at 09:18

## 2020-12-19 RX ADMIN — HYDROCODONE BITARTRATE AND ACETAMINOPHEN 2 TABLET: 5; 325 TABLET ORAL at 14:57

## 2020-12-19 RX ADMIN — DAKIN'S SOLUTION 0.125% (QUARTER STRENGTH): 0.12 SOLUTION at 09:19

## 2020-12-19 RX ADMIN — HYDROCODONE BITARTRATE AND ACETAMINOPHEN 2 TABLET: 5; 325 TABLET ORAL at 02:54

## 2020-12-19 RX ADMIN — LEVOTHYROXINE SODIUM 112 MCG: 0.11 TABLET ORAL at 08:44

## 2020-12-19 RX ADMIN — TRAMADOL HYDROCHLORIDE 50 MG: 50 TABLET, FILM COATED ORAL at 19:22

## 2020-12-19 RX ADMIN — HYDROCODONE BITARTRATE AND ACETAMINOPHEN 2 TABLET: 5; 325 TABLET ORAL at 08:44

## 2020-12-19 RX ADMIN — LISINOPRIL 20 MG: 20 TABLET ORAL at 09:18

## 2020-12-19 RX ADMIN — Medication 10 ML: at 21:57

## 2020-12-19 RX ADMIN — GABAPENTIN 100 MG: 100 CAPSULE ORAL at 21:57

## 2020-12-19 RX ADMIN — CLOTRIMAZOLE AND BETAMETHASONE DIPROPIONATE: 10; .5 CREAM TOPICAL at 09:19

## 2020-12-19 RX ADMIN — ACETAMINOPHEN 650 MG: 325 TABLET ORAL at 01:26

## 2020-12-19 RX ADMIN — GABAPENTIN 100 MG: 100 CAPSULE ORAL at 15:02

## 2020-12-19 RX ADMIN — CLOTRIMAZOLE AND BETAMETHASONE DIPROPIONATE: 10; .5 CREAM TOPICAL at 17:24

## 2020-12-19 RX ADMIN — HYDROCODONE BITARTRATE AND ACETAMINOPHEN 2 TABLET: 5; 325 TABLET ORAL at 20:58

## 2020-12-19 RX ADMIN — PANTOPRAZOLE SODIUM 40 MG: 40 TABLET, DELAYED RELEASE ORAL at 08:44

## 2020-12-19 RX ADMIN — DOCUSATE SODIUM 50 MG AND SENNOSIDES 8.6 MG 1 TABLET: 8.6; 5 TABLET, FILM COATED ORAL at 09:18

## 2020-12-19 NOTE — CONSULTS
ORTHOPAEDIC CONSULT NOTE    Subjective:     Date of Consultation:  December 19, 2020  Referring Physician:  Donte    Samantha Purdy is a 53 y.o. male with extensive PMH most significant for [facility] with left sided residual deficits, HTN, Bladder cancer, chronic left foot wound and chronic pain is seen for bilateral knee pain and trouble walking. Patient states that he has had knee pain for years and had an arthroscopic procedure almost 20yrs ago on the right but until about a month ago was able to walk reasonably well with a cane. States that he went to [facility] to see a wound specialist for his left leg wounds when his wounds were opened further traumatically while undergoing some imaging. He states he was confined to bed for an extended period following this while admitted and that he has had trouble walking since due to range of motion issues. He was admitted here following a fall with head bleed and has had increasing knee pain since. We have been asked to see patient for his symptoms. He denies other joint pain. He denies tingling or numbness and denies fevers or chills.     Patient Active Problem List    Diagnosis Date Noted    Traumatic subarachnoid hemorrhage ([address] 12/10/2020    Traumatic subarachnoid hemorrhage without loss of consciousness ([address] 12/10/2020    Pyoderma gangrenosum 10/30/2020    Open wound, lower leg 05/30/2019    Depression 05/20/2019    Foot infection 04/23/2019    Nonadherence to medical treatment 10/12/2018    Sinus bradycardia 10/12/2018    Gout 10/12/2018    Hypothyroidism 10/12/2018    Foot ulcer ([address] 09/28/2018    Chronic obstructive pulmonary disease ([address] 08/08/2018    Chronic pain disorder 08/08/2018    Hypokalemia 08/08/2018    Seizure disorder ([address] 08/08/2018    Tobacco abuse 08/08/2018    Major depression 44/42/9444    Uncomplicated opioid dependence ([address] 06/06/2018    Left foot infection 03/13/2018    Malignant neoplasm of urinary bladder ([address] 03/06/2018  FH: bladder cancer 03/06/2018    Long term current use of methadone for pain control 12/27/2017    Major depressive disorder with current active episode 12/27/2017    Physical debility 12/27/2017    HTN (hypertension) 12/21/2017    Cellulitis of left lower extremity 12/21/2017    Drug overdose 10/09/2016    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    Brain aneurysm 12/30/2013    Chronic pain 12/30/2013    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/2013    Spasticity 12/30/2013    Cerebral infarction ([address] 12/12/2012    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    Cerebral hemorrhage with hemiparesis ([address] 04/15/2011    Central pain syndrome 04/15/2011    Stroke ([address] 03/11/2011    Hypertension 03/11/2011    Thromboembolism ([address] 03/11/2011     Family History   Problem Relation Age of Onset    Diabetes Father     Lung Disease Father     Diabetes Sister     Diabetes Brother     Cancer Paternal Grandfather         Bladder      Social History     Tobacco Use    Smoking status: Current Every Day Smoker     Packs/day: 0.50    Smokeless tobacco: Never Used    Tobacco comment: instructed not to smoke 24 hrs prior surgery   Substance Use Topics    Alcohol use:  Yes     Alcohol/week: 6.0 standard drinks     Types: 6 Cans of beer per week     Comment: occasional     Past Medical History:   Diagnosis Date    Aneurysm ([address]     (with stroke)    Bladder tumor     Cancer ([address]     bladder CA    Chronic pain     Family history of bladder cancer     GERD (gastroesophageal reflux disease)     Gout     History of vascular access device 04/25/2019    [facility] VAT 4 FR MIDLINE R Cephalic Limited access    History of vascular access device 04/26/2019    4 fr Midline right Cephalic midline access    Hypercholesterolemia     Hypertension     Lesion of bladder     Seizures ([address]     Stroke ([address] 2006    left sided weakness    Thromboembolus ([address]     left leg    Thyroid disease     TIA (transient ischemic attack) 2012 Past Surgical History:   Procedure Laterality Date    HX ORTHOPAEDIC      R arthroscopy    HX OTHER SURGICAL      x2 L foot    HX UROLOGICAL  04/20/2018    Cystoscopy, TURBT (greater than 5 cm resected) Dr. Demetrio Pyle, [facility].  KNEE ARTHROSCP HARV      left knee    TRANSURETHRAL RESEC BLADDER NECK  08/10/2018      Prior to Admission medications    Medication Sig Start Date End Date Taking? Authorizing Provider   lidocaine (XYLOCAINE) 2 % jelly Apply to affected areas  Indications: wound care 10/30/20   Garret Walker MD   gabapentin (NEURONTIN) 100 mg capsule Take 100 mg by mouth three (3) times daily. Provider, Historical   levothyroxine (SYNTHROID) 112 mcg tablet Take 112 mcg by mouth Daily (before breakfast). Provider, Historical   lisinopriL (PRINIVIL, ZESTRIL) 20 mg tablet Take 20 mg by mouth daily. Provider, Historical   naproxen sodium (Aleve) 220 mg tablet Take 220 mg by mouth three (3) times daily as needed. Provider, Historical   ibuprofen (MOTRIN) 400 mg tablet Take 800 mg by mouth every six (6) hours as needed. 8/5/19   Provider, Historical   tamsulosin (FLOMAX) 0.4 mg capsule TAKE ONE CAPSULE BY MOUTH ONE TIME DAILY (after dinner) 8/7/20   Glyn Angelucci, MD   aspirin 81 mg chewable tablet Take 81 mg by mouth daily. Provider, Historical   pantoprazole (PROTONIX) 40 mg tablet Take 40 mg by mouth daily as needed. 7/17/19   Provider, Historical   CHANTIX STARTING MONTH BOX 0.5 mg (11)- 1 mg (42) DsPk TAKE 1 TABLET BY MOUTH IN MORNING WITH FOOD FOR 3 DAYS THEN INCREASE TO 1 TABLET BY MOUTH TWICE DAILY WITH FOOD THEREAFTER AS DIRECTED ON PA 6/21/19   Provider, Historical   allopurinol (ZYLOPRIM) 100 mg tablet Take 1 Tab by mouth daily. Indications: treatment to prevent acute gout attack 5/24/19   Colby EDOUARD NP   senna-docusate (DARELL-COLACE) 8.6-50 mg per tablet Take 1 Tab by mouth daily.     Provider, Historical   methadone (DOLOPHINE) 10 mg/mL solution Take 140 mg by mouth daily. Indications: excessive pain    Provider, Historical   colchicine 0.6 mg tablet Take 0.6 mg by mouth daily as needed (gout flare). Provider, Historical   melatonin 3 mg tablet Take 1 Tab by mouth nightly as needed.  10/12/18   Margot Solorzano MD     Current Facility-Administered Medications   Medication Dose Route Frequency    traMADoL (ULTRAM) tablet 50 mg  50 mg Oral Q6H PRN    clotrimazole-betamethasone (LOTRISONE) 1-0.05 % cream   Topical BID    HYDROcodone-acetaminophen (NORCO) 5-325 mg per tablet 2 Tab  2 Tab Oral Q6H PRN    ibuprofen (MOTRIN) tablet 600 mg  600 mg Oral DAILY    sodium hypochlorite (QUARTER STRENGTH DAKIN'S) 0.125% irrigation (bottle)   Topical DAILY    diphenhydrAMINE (BENADRYL) capsule 25 mg  25 mg Oral Q6H PRN    lidocaine (XYLOCAINE) 4 % cream   Topical PRN    sodium chloride (NS) flush 5-40 mL  5-40 mL IntraVENous Q8H    sodium chloride (NS) flush 5-40 mL  5-40 mL IntraVENous PRN    acetaminophen (TYLENOL) tablet 650 mg  650 mg Oral Q6H PRN    Or    acetaminophen (TYLENOL) suppository 650 mg  650 mg Rectal Q6H PRN    polyethylene glycol (MIRALAX) packet 17 g  17 g Oral DAILY PRN    promethazine (PHENERGAN) tablet 12.5 mg  12.5 mg Oral Q6H PRN    Or    ondansetron (ZOFRAN) injection 4 mg  4 mg IntraVENous Q6H PRN    colchicine tablet 0.6 mg  0.6 mg Oral DAILY PRN    gabapentin (NEURONTIN) capsule 100 mg  100 mg Oral TID    levothyroxine (SYNTHROID) tablet 112 mcg  112 mcg Oral ACB    lisinopriL (PRINIVIL, ZESTRIL) tablet 20 mg  20 mg Oral DAILY    melatonin tablet 3 mg  3 mg Oral QHS PRN    methadone (DOLOPHINE) tablet 140 mg  140 mg Oral DAILY    pantoprazole (PROTONIX) tablet 40 mg  40 mg Oral ACB&D    tamsulosin (FLOMAX) capsule 0.4 mg  0.4 mg Oral PCD    allopurinoL (ZYLOPRIM) tablet 100 mg  100 mg Oral DAILY    senna-docusate (PERICOLACE) 8.6-50 mg per tablet 1 Tab  1 Tab Oral DAILY      Allergies   Allergen Reactions    Sulfamethoxazole-Trimethoprim Rash     L eye and L hand    Ciprofloxacin Hives     Per pcp records    Codeine Hives     Tolerates dilaudid, oxycodone    Cymbalta [Duloxetine] Other (comments)     Confusion and memory loss    Lyrica [Pregabalin] Hives    Sulfa (Sulfonamide Antibiotics) Rash    Ultram [Tramadol] Hives    Vancomycin Shortness of Breath        Review of Systems:  Pertinent items are noted in HPI. Mental Status: no dementia    Objective:     Patient Vitals for the past 8 hrs:   BP Temp Pulse Resp SpO2   20 0830 137/83 97.2 °F (36.2 °C) (!) 59 16 97 %     Temp (24hrs), Av.6 °F (36.4 °C), Min:97.2 °F (36.2 °C), Max:97.8 °F (36.6 °C)      EXAM: Awake and alert lying in bed but able to mobilize under own power; NAD; agreeable to exam  Left arm with residual stroke deficits and locked into position of mild flexion at elbow and adduction for upper arm  Left lower leg with large bandage in place overlying extensive wound complex as detailed in Podiatry note.   Knees bilaterally shows signs of degenerative changes but there is not erythema and no warmth to the joints  The left knee shows a flexion contracture of at least 30deg and the right side has about a 5deg contracture  Able to actively flex/extend bilat legs but with significant limitations on ROM especially on the left - minimal pain with these movement  Joint lines NTTP; crepitus palpable  Right ankle with intact DF/PF; left side limited by stroke deficits and wound complications    Imaging Review: Awaiting bilateral knee imaging    Labs:   Recent Results (from the past 24 hour(s))   CBC WITH AUTOMATED DIFF    Collection Time: 20  3:51 AM   Result Value Ref Range    WBC 12.4 (H) 4.1 - 11.1 K/uL    RBC 3.71 (L) 4.10 - 5.70 M/uL    HGB 9.4 (L) 12.1 - 17.0 g/dL    HCT 30.5 (L) 36.6 - 50.3 %    MCV 82.2 80.0 - 99.0 FL    MCH 25.3 (L) 26.0 - 34.0 PG    MCHC 30.8 30.0 - 36.5 g/dL    RDW 15.9 (H) 11.5 - 14.5 %    PLATELET 466 481 - 880 K/uL    MPV 10.2 8.9 - 12.9 FL    NRBC 0.0 0  WBC    ABSOLUTE NRBC 0.00 0.00 - 0.01 K/uL    NEUTROPHILS 58 32 - 75 %    LYMPHOCYTES 22 12 - 49 %    MONOCYTES 10 5 - 13 %    EOSINOPHILS 8 (H) 0 - 7 %    BASOPHILS 1 0 - 1 %    IMMATURE GRANULOCYTES 1 (H) 0.0 - 0.5 %    ABS. NEUTROPHILS 7.3 1.8 - 8.0 K/UL    ABS. LYMPHOCYTES 2.8 0.8 - 3.5 K/UL    ABS. MONOCYTES 1.2 (H) 0.0 - 1.0 K/UL    ABS. EOSINOPHILS 1.0 (H) 0.0 - 0.4 K/UL    ABS. BASOPHILS 0.1 0.0 - 0.1 K/UL    ABS. IMM.  GRANS. 0.1 (H) 0.00 - 0.04 K/UL    DF AUTOMATED     METABOLIC PANEL, BASIC    Collection Time: 12/19/20  3:51 AM   Result Value Ref Range    Sodium 137 136 - 145 mmol/L    Potassium 4.0 3.5 - 5.1 mmol/L    Chloride 104 97 - 108 mmol/L    CO2 28 21 - 32 mmol/L    Anion gap 5 5 - 15 mmol/L    Glucose 113 (H) 65 - 100 mg/dL    BUN 16 6 - 20 MG/DL    Creatinine 0.69 (L) 0.70 - 1.30 MG/DL    BUN/Creatinine ratio 23 (H) 12 - 20      GFR est AA >60 >60 ml/min/1.73m2    GFR est non-AA >60 >60 ml/min/1.73m2    Calcium 8.7 8.5 - 10.1 MG/DL         Impression:     Active Problems:    Traumatic subarachnoid hemorrhage (HCC) (12/10/2020)      Traumatic subarachnoid hemorrhage without loss of consciousness (HCC) (12/10/2020)    Bilateral knee pain    Plan:     # Bilateral knee pain -  - Suspect extensive degenerative changes; awaiting imaging  - PT/OT, continue bed/chair exercises  - OK to bear weight from Ortho perspective and per Podiatry note patient is WBAT  - If patient has extensive degeneration he will likely require TKAs to alleviate his contractures but his ongoing foot wounds will preclude that for now; these wounds also preclude the use of steroid injection into the joint  - Pain control per primary team  - Should have ongoing outpatient follow-up/surveillance of his joint pain and wound status to determine next best steps  - Following for imaging    Dr Meka Bowles aware of patient and in agreement with current plan     Jason Ornelas, RITA [id number]

## 2020-12-19 NOTE — PROGRESS NOTES
Hospitalist Progress Note  Nita Barry MD  Answering service: [phone] from in house phone        Date of Service:  2020  NAME:  Deep Juarez  :  1965  MRN:  [id number]      Admission Summary:      Deep Juarez is a 53 y.o. male who presents with      As per initial admission summary  HPI the patient is a transfer from [facility] for treatment/observation of a traumatic subarachnoid hemorrhage and also treatment of a left leg wound infection.  The patient fell last night around midnight, hitting his head. Monisha Brown has a laceration to the left parietal area that has been stapled.  The patient complains of a generalized headache which is unchanged since the fall.  He also has a chronic wound to his left leg/foot that appears to be secondarily infected.  The physician at [facility] spoke with the intensivist here and they accepted the patient in transfer.     On my HP. Patient with past medical history significant for chronic left foot ulcers since at least 2018 on chronic methadone and followed by behavioral health clinic history of stroke in  patient was transferred from [facility] as per documentation he does not want to go over there patient had a fall overnight and hit his head there is a laceration to the left parietal area that has been scattered patient complained of generalized headache and also concerned about chronic wound on the left foot patient was initially accepted to the ICU but repeat imaging here at [facility] did not show any bleeding patient does not report any fever, chest pain, shortness of breath or any other associated symptoms. He told me he lives by himself and one of his friend helps him with the dressing of the wound.   As per documentation patient was previously recommended amputation by the plastic surgery but he refused admitted to hospitalist service podiatry and infectious disease consulted       Interval history / Subjective:   Patient seen and examined this morning. Patient c/o right knee pain causing him unable to walk. Assessment & Plan:     # Traumatic sub arachnoid hemorrhage: Resolved   - Repeat CT showed it has been resolved  - Neurosurgery signed off  - Staples need to be removed prior to discharge as per rafaela      # Chronic foot ulcer  H/o Pyoderma gangrenosum   - On IV Zosyn--- completed on 12/17   - Prior history of wound culture positive, Wound cultures ordered , pending   - Podiatry and ID followed appt input   - Podiatry folllowing. No surgical intervention at this time. - Wound care following   - Will need [facility] follow up, to be arranged by Podiatry     # Knee pain   Likely osteoarthritis   - will obtain x-ray of knee   - pain management- tramadol with Tylenol   - Ortho consult      # Chronic methadone use  - continue home dose     # Hypothyroidism  - Continue synthroid     # HTN  - Continue lisinopril       Code status: full code   DVT prophylaxis: scds    Care Plan discussed with: Patient/Family  Disposition: SNF/LTC and TBD     Hospital Problems  Date Reviewed: 9/9/2020          Codes Class Noted POA    Traumatic subarachn[address] hemorrhage ([facility] [address] ICD-10-CM: R13.8H2Y  ICD-9-CM: 852.00  12/10/2020 Unknown        Traumatic subarachnoid hemorrhage without loss of consciousness ([facility] [address] ICD-10-CM: Y94.1Q6C  ICD-9-CM: 852.01  12/10/2020 Yes                Review of Systems:   A comprehensive review of systems was negative except for that written in the HPI. Vital Signs:    Last 24hrs VS reviewed since prior progress note.  Most recent are:  Visit Vitals  /83 (BP 1 Location: Right arm, BP Patient Position: At rest)   Pulse (!) 59   Temp 97.2 °F (36.2 °C)   Resp 16   Ht 6' 0.84\" (1.85 m)   Wt 107.9 kg (237 lb 14 oz)   SpO2 97%   BMI 31.53 kg/m²         Intake/Output Summary (Last 24 hours) at 12/19/2020 1226  Last data filed at 12/19/2020 0356  Gross per 24 hour   Intake --   Output 400 ml   Net -400 ml        Physical Examination:   I had a face to face encounter with this patient and independently examined them on December 19, 2020 as outlined below:        General:  Alert, cooperative, no distress, appears stated age. Head:  Normocephalic, staples left parietal area    Lungs:   Clear to auscultation bilaterally. Chest wall:  No tenderness or deformity. Heart:  Regular rate and rhythm, S1, S2 normal, no murmur, click, rub or gallop. Abdomen:   Soft, non-tender. Bowel sounds normal. No masses,  No organomegaly. Extremities: Chronic wound left foot covered with dressing    Pulses: 2+ and symmetric all extremities. Neurologic: CNII-XII intact. Normal strength, sensation and reflexes throughout. Data Review:   I personally reviewed labs and imaging       Labs:     Recent Labs     12/19/20 0351 12/18/20  0448   WBC 12.4* 12.2*   HGB 9.4* 9.8*   HCT 30.5* 32.3*    311     Recent Labs     12/19/20  0351 12/18/20 0448 12/17/20  0455    139 137   K 4.0 4.1 4.2    106 105   CO2 28 28 26   BUN 16 14 13   CREA 0.69* 0.74 0.71   * 98 97   CA 8.7 8.6 8.9     No results for input(s): ALT, AP, TBIL, TBILI, TP, ALB, GLOB, GGT, AML, LPSE in the last 72 hours. No lab exists for component: SGOT, GPT, AMYP, HLPSE  No results for input(s): INR, PTP, APTT, INREXT, INREXT in the last 72 hours. No results for input(s): FE, TIBC, PSAT, FERR in the last 72 hours. Lab Results   Component Value Date/Time    Folate 8.6 12/13/2012 03:20 AM      No results for input(s): PH, PCO2, PO2 in the last 72 hours. No results for input(s): CPK, CKNDX, TROIQ in the last 72 hours.     No lab exists for component: CPKMB  Lab Results   Component Value Date/Time    Cholesterol, total 152 12/13/2012 03:30 AM    HDL Cholesterol 26 12/13/2012 03:30 AM    LDL, calculated 92.2 12/13/2012 03:30 AM    Triglyceride 169 (H) 12/13/2012 03:30 AM    CHOL/HDL Ratio 5.8 (H) 12/13/2012 03:30 AM     Lab Results   Component Value Date/Time    Glucose (POC) 150 (H) 03/19/2018 11:45 AM    Glucose (POC) 123 (H) 03/18/2018 08:55 PM    Glucose (POC) 96 03/16/2018 04:58 PM    Glucose (POC) 116 (H) 12/12/2012 12:42 PM    Glucose (POC) 99 12/11/2012 11:27 PM     Lab Results   Component Value Date/Time    Color YELLOW/STRAW 05/20/2019 05:52 AM    Appearance CLEAR 05/20/2019 05:52 AM    Specific gravity 1.011 05/20/2019 05:52 AM    pH (UA) 5.5 05/20/2019 05:52 AM    Protein NEGATIVE  05/20/2019 05:52 AM    Glucose NEGATIVE  05/20/2019 05:52 AM    Ketone NEGATIVE  05/20/2019 05:52 AM    Bilirubin NEGATIVE  05/20/2019 05:52 AM    Urobilinogen 0.2 05/20/2019 05:52 AM    Nitrites NEGATIVE  05/20/2019 05:52 AM    Leukocyte Esterase NEGATIVE  05/20/2019 05:52 AM    Epithelial cells FEW 05/20/2019 05:52 AM    Bacteria NEGATIVE  05/20/2019 05:52 AM    WBC 0-4 05/20/2019 05:52 AM    RBC 0-5 05/20/2019 05:52 AM         Medications Reviewed:     Current Facility-Administered Medications   Medication Dose Route Frequency    traMADoL (ULTRAM) tablet 50 mg  50 mg Oral Q6H PRN    clotrimazole-betamethasone (LOTRISONE) 1-0.05 % cream   Topical BID    HYDROcodone-acetaminophen (NORCO) 5-325 mg per tablet 2 Tab  2 Tab Oral Q6H PRN    ibuprofen (MOTRIN) tablet 600 mg  600 mg Oral DAILY    sodium hypochlorite (QUARTER STRENGTH DAKIN'S) 0.125% irrigation (bottle)   Topical DAILY    diphenhydrAMINE (BENADRYL) capsule 25 mg  25 mg Oral Q6H PRN    lidocaine (XYLOCAINE) 4 % cream   Topical PRN    sodium chloride (NS) flush 5-40 mL  5-40 mL IntraVENous Q8H    sodium chloride (NS) flush 5-40 mL  5-40 mL IntraVENous PRN    acetaminophen (TYLENOL) tablet 650 mg  650 mg Oral Q6H PRN    Or    acetaminophen (TYLENOL) suppository 650 mg  650 mg Rectal Q6H PRN    polyethylene glycol (MIRALAX) packet 17 g  17 g Oral DAILY PRN    promethazine (PHENERGAN) tablet 12.5 mg  12.5 mg Oral Q6H PRN    Or    ondansetron (ZOFRAN) injection 4 mg  4 mg IntraVENous Q6H PRN    colchicine tablet 0.6 mg  0.6 mg Oral DAILY PRN    gabapentin (NEURONTIN) capsule 100 mg  100 mg Oral TID    levothyroxine (SYNTHROID) tablet 112 mcg  112 mcg Oral ACB    lisinopriL (PRINIVIL, ZESTRIL) tablet 20 mg  20 mg Oral DAILY    melatonin tablet 3 mg  3 mg Oral QHS PRN    methadone (DOLOPHINE) tablet 140 mg  140 mg Oral DAILY    pantoprazole (PROTONIX) tablet 40 mg  40 mg Oral ACB&D    tamsulosin (FLOMAX) capsule 0.4 mg  0.4 mg Oral PCD    allopurinoL (ZYLOPRIM) tablet 100 mg  100 mg Oral DAILY    senna-docusate (PERICOLACE) 8.6-50 mg per tablet 1 Tab  1 Tab Oral DAILY     ______________________________________________________________________  EXPECTED LENGTH OF STAY: 3d 2h  ACTUAL LENGTH OF STAY:          1                 Monica Kent MD

## 2020-12-20 PROCEDURE — 74011636637 HC RX REV CODE- 636/637: Performed by: INTERNAL MEDICINE

## 2020-12-20 PROCEDURE — 99218 HC RM OBSERVATION: CPT

## 2020-12-20 PROCEDURE — 74011250637 HC RX REV CODE- 250/637: Performed by: INTERNAL MEDICINE

## 2020-12-20 RX ORDER — PREDNISONE 20 MG/1
40 TABLET ORAL
Status: DISCONTINUED | OUTPATIENT
Start: 2020-12-20 | End: 2020-12-22 | Stop reason: HOSPADM

## 2020-12-20 RX ADMIN — HYDROCODONE BITARTRATE AND ACETAMINOPHEN 2 TABLET: 5; 325 TABLET ORAL at 13:58

## 2020-12-20 RX ADMIN — METHADONE HYDROCHLORIDE 140 MG: 10 TABLET ORAL at 09:45

## 2020-12-20 RX ADMIN — PANTOPRAZOLE SODIUM 40 MG: 40 TABLET, DELAYED RELEASE ORAL at 08:52

## 2020-12-20 RX ADMIN — IBUPROFEN 600 MG: 600 TABLET, FILM COATED ORAL at 08:51

## 2020-12-20 RX ADMIN — DOCUSATE SODIUM 50 MG AND SENNOSIDES 8.6 MG 1 TABLET: 8.6; 5 TABLET, FILM COATED ORAL at 08:51

## 2020-12-20 RX ADMIN — TAMSULOSIN HYDROCHLORIDE 0.4 MG: 0.4 CAPSULE ORAL at 18:20

## 2020-12-20 RX ADMIN — CLOTRIMAZOLE AND BETAMETHASONE DIPROPIONATE: 10; .5 CREAM TOPICAL at 18:22

## 2020-12-20 RX ADMIN — Medication 10 ML: at 22:02

## 2020-12-20 RX ADMIN — CLOTRIMAZOLE AND BETAMETHASONE DIPROPIONATE: 10; .5 CREAM TOPICAL at 08:54

## 2020-12-20 RX ADMIN — PANTOPRAZOLE SODIUM 40 MG: 40 TABLET, DELAYED RELEASE ORAL at 18:20

## 2020-12-20 RX ADMIN — TRAMADOL HYDROCHLORIDE 50 MG: 50 TABLET, FILM COATED ORAL at 15:54

## 2020-12-20 RX ADMIN — PREDNISONE 40 MG: 20 TABLET ORAL at 14:04

## 2020-12-20 RX ADMIN — GABAPENTIN 100 MG: 100 CAPSULE ORAL at 15:54

## 2020-12-20 RX ADMIN — GABAPENTIN 100 MG: 100 CAPSULE ORAL at 08:51

## 2020-12-20 RX ADMIN — ALLOPURINOL 100 MG: 100 TABLET ORAL at 08:51

## 2020-12-20 RX ADMIN — LEVOTHYROXINE SODIUM 112 MCG: 0.11 TABLET ORAL at 08:51

## 2020-12-20 RX ADMIN — ACETAMINOPHEN 650 MG: 325 TABLET ORAL at 22:00

## 2020-12-20 RX ADMIN — DAKIN'S SOLUTION 0.125% (QUARTER STRENGTH): 0.12 SOLUTION at 08:54

## 2020-12-20 RX ADMIN — HYDROCODONE BITARTRATE AND ACETAMINOPHEN 2 TABLET: 5; 325 TABLET ORAL at 03:51

## 2020-12-20 RX ADMIN — ACETAMINOPHEN 650 MG: 325 TABLET ORAL at 15:54

## 2020-12-20 RX ADMIN — GABAPENTIN 100 MG: 100 CAPSULE ORAL at 22:01

## 2020-12-20 RX ADMIN — HYDROCODONE BITARTRATE AND ACETAMINOPHEN 2 TABLET: 5; 325 TABLET ORAL at 21:00

## 2020-12-20 RX ADMIN — LISINOPRIL 20 MG: 20 TABLET ORAL at 08:51

## 2020-12-20 RX ADMIN — ACETAMINOPHEN 650 MG: 325 TABLET ORAL at 00:39

## 2020-12-20 RX ADMIN — Medication 3 MG: at 22:01

## 2020-12-20 RX ADMIN — Medication 10 ML: at 14:06

## 2020-12-20 NOTE — PROGRESS NOTES
Hospitalist Progress Note  Angeline Bryant MD  Answering service: [phone] from in house phone        Date of Service:  2020  NAME:  Samantha Purdy  :  1965  MRN:  [id number]      Admission Summary:      Samantha Purdy is a 53 y.o. male who presents with      As per initial admission summary  HPI the patient is a transfer from [facility] for treatment/observation of a traumatic subarachnoid hemorrhage and also treatment of a left leg wound infection.  The patient fell last night around midnight, hitting his head. [facility] has a laceration to the left parietal area that has been stapled.  The patient complains of a generalized headache which is unchanged since the fall.  He also has a chronic wound to his left leg/foot that appears to be secondarily infected.  The physician at [facility] spoke with the intensivist here and they accepted the patient in transfer.     On my HP. Patient with past medical history significant for chronic left foot ulcers since at least 2018 on chronic methadone and followed by behavioral health clinic history of stroke in  patient was transferred from [facility] as per documentation he does not want to go over there patient had a fall overnight and hit his head there is a laceration to the left parietal area that has been scattered patient complained of generalized headache and also concerned about chronic wound on the left foot patient was initially accepted to the ICU but repeat imaging here at [facility] did not show any bleeding patient does not report any fever, chest pain, shortness of breath or any other associated symptoms. He told me he lives by himself and one of his friend helps him with the dressing of the wound.   As per documentation patient was previously recommended amputation by the plastic surgery but he refused admitted to hospitalist service podiatry and infectious disease consulted       Interval history / Subjective:   Patient seen and examined this morning. Still complaining of right knee pain and unable to walk. Case discussed with orthopedics and joint steroid injection might be risky in this patient. Assessment & Plan:     # Traumatic sub arachnoid hemorrhage: Resolved   - Repeat CT showed it has been resolved  - Neurosurgery signed off  - Staples need to be removed prior to discharge as per rafaela      # Chronic foot ulcer  H/o Pyoderma gangrenosum   - On IV Zosyn--- completed on 12/17   - Prior history of wound culture positive, Wound cultures ordered , pending   - Podiatry and ID followed appt input   - Podiatry folllowing. No surgical intervention at this time. - Wound care following   - Will need [facility] follow up, to be arranged by Podiatry     # Knee pain with possible gout flare on right knee   Hx of gout, with ikely additional osteoarthritis on both knees   - x-ray remarkable for bilateral DJD  - pain management- tramadol with Tylenol   - no response with NSAIDs, start on oral steroid   - Ortho followed appt input- need bilateral knee replacement. To risky to do steroid joint injection. Recommended pain management and PT.      # Chronic methadone use  - continue home dose     # Hypothyroidism  - Continue synthroid     # HTN  - Continue lisinopril       Code status: full code   DVT prophylaxis: scds    Care Plan discussed with: Patient/Family  Disposition: SNF/LTC and TBD     Hospital Problems  Date Reviewed: 9/9/2020          Codes Class Noted POA    Traumatic subarachnoid hemorrhage ([facility] ICD-10-CM: V66.5D0M  ICD-9-CM: 852.00  12/10/2020 Unknown        Traumatic subarachnoid hemorrhage without loss of consciousness ([facility] ICD-10-CM: K05.6W3D  ICD-9-CM: 852.01  12/10/2020 Yes                Review of Systems:   A comprehensive review of systems was negative except for that written in the HPI.        Vital Signs:    Last 24hrs VS reviewed since prior progress note. Most recent are:  Visit Vitals  BP (!) 163/88   Pulse 60   Temp 98.5 °F (36.9 °C)   Resp 18   Ht 6' 0.84\" (1.85 m)   Wt 107.9 kg (237 lb 14 oz)   SpO2 96%   BMI 31.53 kg/m²         Intake/Output Summary (Last 24 hours) at 12/20/2020 1452  Last data filed at 12/19/2020 2350  Gross per 24 hour   Intake --   Output 1700 ml   Net -1700 ml        Physical Examination:   I had a face to face encounter with this patient and independently examined them on December 20, 2020 as outlined below:        General:  Alert, cooperative, no distress, appears stated age. Head:  Normocephalic, staples left parietal area    Lungs:   Clear to auscultation bilaterally. Chest wall:  No tenderness or deformity. Heart:  Regular rate and rhythm, S1, S2 normal, no murmur, click, rub or gallop. Abdomen:   Soft, non-tender. Bowel sounds normal. No masses,  No organomegaly. Extremities: Chronic wound left foot covered with dressing    Pulses: 2+ and symmetric all extremities. Neurologic: CNII-XII intact. Normal strength, sensation and reflexes throughout. Data Review:   I personally reviewed labs and imaging       Labs:     Recent Labs     12/19/20  0351 12/18/20  0448   WBC 12.4* 12.2*   HGB 9.4* 9.8*   HCT 30.5* 32.3*    311     Recent Labs     12/19/20  0351 12/18/20  0448    139   K 4.0 4.1    106   CO2 28 28   BUN 16 14   CREA 0.69* 0.74   * 98   CA 8.7 8.6     No results for input(s): ALT, AP, TBIL, TBILI, TP, ALB, GLOB, GGT, AML, LPSE in the last 72 hours. No lab exists for component: SGOT, GPT, AMYP, HLPSE  No results for input(s): INR, PTP, APTT, INREXT, INREXT in the last 72 hours. No results for input(s): FE, TIBC, PSAT, FERR in the last 72 hours. Lab Results   Component Value Date/Time    Folate 8.6 12/13/2012 03:20 AM      No results for input(s): PH, PCO2, PO2 in the last 72 hours.   No results for input(s): CPK, CKNDX, TROIQ in the last 72 hours.     No lab exists for component: CPKMB  Lab Results   Component Value Date/Time    Cholesterol, total 152 12/13/2012 03:30 AM    HDL Cholesterol 26 12/13/2012 03:30 AM    LDL, calculated 92.2 12/13/2012 03:30 AM    Triglyceride 169 (H) 12/13/2012 03:30 AM    CHOL/HDL Ratio 5.8 (H) 12/13/2012 03:30 AM     Lab Results   Component Value Date/Time    Glucose (POC) 150 (H) 03/19/2018 11:45 AM    Glucose (POC) 123 (H) 03/18/2018 08:55 PM    Glucose (POC) 96 03/16/2018 04:58 PM    Glucose (POC) 116 (H) 12/12/2012 12:42 PM    Glucose (POC) 99 12/11/2012 11:27 PM     Lab Results   Component Value Date/Time    Color YELLOW/STRAW 05/20/2019 05:52 AM    Appearance CLEAR 05/20/2019 05:52 AM    Specific gravity 1.011 05/20/2019 05:52 AM    pH (UA) 5.5 05/20/2019 05:52 AM    Protein NEGATIVE  05/20/2019 05:52 AM    Glucose NEGATIVE  05/20/2019 05:52 AM    Ketone NEGATIVE  05/20/2019 05:52 AM    Bilirubin NEGATIVE  05/20/2019 05:52 AM    Urobilinogen 0.2 05/20/2019 05:52 AM    Nitrites NEGATIVE  05/20/2019 05:52 AM    Leukocyte Esterase NEGATIVE  05/20/2019 05:52 AM    Epithelial cells FEW 05/20/2019 05:52 AM    Bacteria NEGATIVE  05/20/2019 05:52 AM    WBC 0-4 05/20/2019 05:52 AM    RBC 0-5 05/20/2019 05:52 AM         Medications Reviewed:     Current Facility-Administered Medications   Medication Dose Route Frequency    predniSONE (DELTASONE) tablet 40 mg  40 mg Oral DAILY WITH BREAKFAST    traMADoL (ULTRAM) tablet 50 mg  50 mg Oral Q6H PRN    clotrimazole-betamethasone (LOTRISONE) 1-0.05 % cream   Topical BID    HYDROcodone-acetaminophen (NORCO) 5-325 mg per tablet 2 Tab  2 Tab Oral Q6H PRN    ibuprofen (MOTRIN) tablet 600 mg  600 mg Oral DAILY    sodium hypochlorite (QUARTER STRENGTH DAKIN'S) 0.125% irrigation (bottle)   Topical DAILY    diphenhydrAMINE (BENADRYL) capsule 25 mg  25 mg Oral Q6H PRN    lidocaine (XYLOCAINE) 4 % cream   Topical PRN    sodium chloride (NS) flush 5-40 mL  5-40 mL IntraVENous Q8H    sodium chloride (NS) flush 5-40 mL  5-40 mL IntraVENous PRN    acetaminophen (TYLENOL) tablet 650 mg  650 mg Oral Q6H PRN    Or    acetaminophen (TYLENOL) suppository 650 mg  650 mg Rectal Q6H PRN    polyethylene glycol (MIRALAX) packet 17 g  17 g Oral DAILY PRN    promethazine (PHENERGAN) tablet 12.5 mg  12.5 mg Oral Q6H PRN    Or    ondansetron (ZOFRAN) injection 4 mg  4 mg IntraVENous Q6H PRN    colchicine tablet 0.6 mg  0.6 mg Oral DAILY PRN    gabapentin (NEURONTIN) capsule 100 mg  100 mg Oral TID    levothyroxine (SYNTHROID) tablet 112 mcg  112 mcg Oral ACB    lisinopriL (PRINIVIL, ZESTRIL) tablet 20 mg  20 mg Oral DAILY    melatonin tablet 3 mg  3 mg Oral QHS PRN    methadone (DOLOPHINE) tablet 140 mg  140 mg Oral DAILY    pantoprazole (PROTONIX) tablet 40 mg  40 mg Oral ACB&D    tamsulosin (FLOMAX) capsule 0.4 mg  0.4 mg Oral PCD    allopurinoL (ZYLOPRIM) tablet 100 mg  100 mg Oral DAILY    senna-docusate (PERICOLACE) 8.6-50 mg per tablet 1 Tab  1 Tab Oral DAILY     ______________________________________________________________________  EXPECTED LENGTH OF STAY: 3d 2h  ACTUAL LENGTH OF STAY:          1                 Monica Kent MD

## 2020-12-20 NOTE — PROGRESS NOTES
Assumed care of patient. SBAR report received from off going nurse. Raymundo Belcher RN. Patient alert and oriented x 4. Patient offers no concerns and is able to verbalized needs. Patient complains of pain 7/10 in left foot. Patient in bed. Breathing appears even and unlabored. Assessment to follow. Bed in lowest locked position. White board updated. Call bell within reach. Will continue to monitor.

## 2020-12-21 PROCEDURE — 74011636637 HC RX REV CODE- 636/637: Performed by: INTERNAL MEDICINE

## 2020-12-21 PROCEDURE — 99218 HC RM OBSERVATION: CPT

## 2020-12-21 PROCEDURE — 97530 THERAPEUTIC ACTIVITIES: CPT

## 2020-12-21 PROCEDURE — 74011000250 HC RX REV CODE- 250: Performed by: STUDENT IN AN ORGANIZED HEALTH CARE EDUCATION/TRAINING PROGRAM

## 2020-12-21 PROCEDURE — 74011250637 HC RX REV CODE- 250/637: Performed by: INTERNAL MEDICINE

## 2020-12-21 RX ADMIN — PANTOPRAZOLE SODIUM 40 MG: 40 TABLET, DELAYED RELEASE ORAL at 16:10

## 2020-12-21 RX ADMIN — TRAMADOL HYDROCHLORIDE 50 MG: 50 TABLET, FILM COATED ORAL at 06:32

## 2020-12-21 RX ADMIN — ALLOPURINOL 100 MG: 100 TABLET ORAL at 09:27

## 2020-12-21 RX ADMIN — METHADONE HYDROCHLORIDE 140 MG: 10 TABLET ORAL at 09:51

## 2020-12-21 RX ADMIN — Medication 10 ML: at 22:00

## 2020-12-21 RX ADMIN — HYDROCODONE BITARTRATE AND ACETAMINOPHEN 2 TABLET: 5; 325 TABLET ORAL at 17:42

## 2020-12-21 RX ADMIN — DOCUSATE SODIUM 50 MG AND SENNOSIDES 8.6 MG 1 TABLET: 8.6; 5 TABLET, FILM COATED ORAL at 09:27

## 2020-12-21 RX ADMIN — HYDROCODONE BITARTRATE AND ACETAMINOPHEN 2 TABLET: 5; 325 TABLET ORAL at 11:34

## 2020-12-21 RX ADMIN — Medication 3 MG: at 22:16

## 2020-12-21 RX ADMIN — LIDOCAINE: 40 CREAM TOPICAL at 17:49

## 2020-12-21 RX ADMIN — GABAPENTIN 100 MG: 100 CAPSULE ORAL at 22:16

## 2020-12-21 RX ADMIN — PREDNISONE 40 MG: 20 TABLET ORAL at 09:23

## 2020-12-21 RX ADMIN — IBUPROFEN 600 MG: 600 TABLET, FILM COATED ORAL at 09:28

## 2020-12-21 RX ADMIN — GABAPENTIN 100 MG: 100 CAPSULE ORAL at 16:10

## 2020-12-21 RX ADMIN — CLOTRIMAZOLE AND BETAMETHASONE DIPROPIONATE: 10; .5 CREAM TOPICAL at 11:35

## 2020-12-21 RX ADMIN — CLOTRIMAZOLE AND BETAMETHASONE DIPROPIONATE: 10; .5 CREAM TOPICAL at 18:00

## 2020-12-21 RX ADMIN — GABAPENTIN 100 MG: 100 CAPSULE ORAL at 09:28

## 2020-12-21 RX ADMIN — LEVOTHYROXINE SODIUM 112 MCG: 0.11 TABLET ORAL at 06:32

## 2020-12-21 RX ADMIN — PANTOPRAZOLE SODIUM 40 MG: 40 TABLET, DELAYED RELEASE ORAL at 06:33

## 2020-12-21 RX ADMIN — HYDROCODONE BITARTRATE AND ACETAMINOPHEN 2 TABLET: 5; 325 TABLET ORAL at 03:05

## 2020-12-21 RX ADMIN — TAMSULOSIN HYDROCHLORIDE 0.4 MG: 0.4 CAPSULE ORAL at 17:43

## 2020-12-21 RX ADMIN — DAKIN'S SOLUTION 0.125% (QUARTER STRENGTH): 0.12 SOLUTION at 11:35

## 2020-12-21 RX ADMIN — ACETAMINOPHEN 650 MG: 325 TABLET ORAL at 06:33

## 2020-12-21 RX ADMIN — TRAMADOL HYDROCHLORIDE 50 MG: 50 TABLET, FILM COATED ORAL at 00:43

## 2020-12-21 RX ADMIN — LISINOPRIL 20 MG: 20 TABLET ORAL at 09:28

## 2020-12-21 NOTE — PROGRESS NOTES
[facility]-SNF vs Motel with Home Health  RUR-23% Moderate    SNF referral is pending with [address] declined. If not accepted to [facility], will have to coordinate with [place] to make arrangements for patient to return to [facility] where he can resume home health services. CM to monitor.      Kaley Cortés, MS

## 2020-12-21 NOTE — PROGRESS NOTES
Problem: Mobility Impaired (Adult and Pediatric)  Goal: *Acute Goals and Plan of Care (Insert Text)  Description: FUNCTIONAL STATUS PRIOR TO ADMISSION: Very poor historian. Reports that he was living at a hotel? And then home alone and staying on the 1st floor. Essentially unable to get up from the couch. Per chart, at last recent admission, a L BKA was recommended and he refused; was to be NWB but also refused/was unable to maintain that status    HOME SUPPORT PRIOR TO ADMISSION: Details unknown - very poor historian but sounds like he had very limited assistance     Physical Therapy Goals  Initiated 12/11/2020  1. Patient will move from supine to sit and sit to supine  and scoot up and down in bed with independence within 7 day(s). 2.  Patient will transfer from bed to chair and chair to bed with supervision/set-up using the least restrictive device within 7 day(s). 3.  Patient will perform lateral/scoot transfer to wheelchair vs NWB stand pivot to wheelchair with min A within 7 days. Outcome: Progressing Towards Goal   PHYSICAL THERAPY TREATMENT  Patient: Deep Juarez (70 y.o. male)  Date: 12/21/2020  Diagnosis: SAH (subarachnoid hemorrhage) ([facility]) [I60.9]  [facility] [I60.9] <principal problem not specified>       Precautions:  contact,fall  Chart, physical therapy assessment, plan of care and goals were reviewed. ASSESSMENT  Patient continues with skilled PT services and is slowly progressing towards goals. Pt agreeable to participate however stated he would only stand once to get into bedside chair. Pt requested that therapist not touch him. Pt completed  stand pivot transfer to chair with WB on LLE, reaching with RUE to arm rest of chair while pivoting in crouched position to chair. He is non-compliant with NWB restrictions on LLE and reports his doctor said he can WB on LLE. Pt reports he will be able to transfer back to bed in the same manner.   He is adamant that he is going home and someone else will be there when not at work. Pt reports he will need medical transport and will need a cane so he can walk into the house. Pt reports no one will be home when he returns and he would like to borrow a cane or [place] for a new one to get into the house. Pt is a poor historian with his background information and discharge plan changing. This is a difficult situation as pt is adamant he is going home and stating he can ambulate with a cane however has been requiring maximum to total assistance to stand on previous sessions. His discharge plan is deemed unsafe at this time as he has not progressed to ambulation and now stating he plans to ambulate with a cane into a home. Current Level of Function Impacting Discharge (mobility/balance): stand by assist bed mobility and stand pivot to chair, has not progressed to ambulation at this time    Other factors to consider for discharge: high fall risk, limited assistance available          PLAN :  Patient continues to benefit from skilled intervention to address the above impairments. Continue treatment per established plan of care. to address goals. Recommendation for discharge: (in order for the patient to meet his/her long term goals)  Therapy up to 5 days/week in SNF setting or intensive home health therapy program with increased assistance at home     This discharge recommendation:  Has been made in collaboration with the attending provider and/or case management    IF patient discharges home will need the following DME: straight cane, pt requested cane to get into his home, he has a cane inside the home       SUBJECTIVE:   Patient stated Don't touch me especially on my L side.     OBJECTIVE DATA SUMMARY:   Critical Behavior:  Neurologic State: Alert  Orientation Level: Oriented X4  Cognition: Appropriate for age attention/concentration, Appropriate safety awareness, Follows commands  Safety/Judgement: Decreased insight into deficits, Decreased awareness of need for safety, Decreased awareness of need for assistance, Fall prevention  Functional Mobility Training:  Bed Mobility:     Supine to Sit: Supervision     Scooting: Supervision        Transfers:  Sit to Stand: Stand-by assistance           Bed to Chair: Stand-by assistance                    Balance:  Sitting: Intact  Standing: Impaired  Standing - Static: Fair  Standing - Dynamic : Fair         Pain Rating:  Complains of pain, RN medicating pt during session    Activity Tolerance:   Poor, only agreed to stand one time    After treatment patient left in no apparent distress:   Sitting in chair and Call bell within reach    COMMUNICATION/COLLABORATION:   The patients plan of care was discussed with: Registered nurse.      Lashay Jurado   Time Calculation: 14 mins

## 2020-12-21 NOTE — PROGRESS NOTES
NUTRITION  Pt seen for:     []           Supplements  [x]             PO intake check   []           Food Allergies  []             Food Preferences/tolerances    []           Rescreen   []             Education    []           Diet order clarification []             Other            RECOMMENDATIONS:     Continue regular diet as ordered    RD has added Ensure Enlive with all meals    SUBJECTIVE/OBJECTIVE:   Information obtained from:  Pt was admitted on 12/10 with traumatic SAH. His medical hx includes: has been homeless, daily smoker, chronic left leg/foot wound, chronic methadone, stroke in 2006, bladder cancer, knee contractures. Pt's intake has been fairly good, denies any recent weight loss. RD will continue to follow on the periphery, awaiting placement to SNF, otherwise will need to return to his motel room and continue with home health services there. Diet:  regular  Supplements:  Ensure Enlive TID  Intake: [x]           Good     []           Fair      []           Poor   No data found.     Weight Changes:   []            Loss  []            Gain  [x]            Stable  Wt Readings from Last 10 Encounters:   12/11/20 107.9 kg (237 lb 14 oz)   10/20/20 109.8 kg (242 lb)   09/09/20 106.6 kg (235 lb)   10/21/19 106.6 kg (235 lb)   10/14/19 106.6 kg (235 lb)   08/08/19 113.4 kg (250 lb)   06/18/19 109.9 kg (242 lb 4.6 oz)   05/20/19 108.9 kg (240 lb)   04/25/19 108.9 kg (240 lb)   04/24/19 108.9 kg (240 lb)       Nutrition Problems Identified:  [x]       None: not at nutrition risk  []           Specified food preferences   []           Dislikes supplements              []           Allergies  []           Difficulty chewing or swallowing   []           Poor dentition   []           Nausea/Vomiting  []           Constipation  []           Diarrhea    PLAN:   []           Obtained/adjusted food preferences/tolerances and/or snacks options   []           Dislikes supplements will try a substitution   [] Modify diet for food allergies  []           Adjust texture due to difficulty chewing   [x]    Continue current diet  []           Educated patient  [x]           Add Supplements  []          Rescreen      Clive Angulo RD, CSP

## 2020-12-21 NOTE — PROGRESS NOTES
Pt in room 548 has lost his IV access. Pt is now refusing another IV and uric acid lab order. Advised Orly Graff. The nurse leaving at midnight and I both spoke with the patient. However, pt still refused. Hospitalist (Emir Edwards) acknowledged the above message via [address] [place] [address].

## 2020-12-22 VITALS
BODY MASS INDEX: 31.53 KG/M2 | SYSTOLIC BLOOD PRESSURE: 156 MMHG | OXYGEN SATURATION: 96 % | RESPIRATION RATE: 18 BRPM | HEIGHT: 73 IN | HEART RATE: 67 BPM | TEMPERATURE: 97.6 F | DIASTOLIC BLOOD PRESSURE: 98 MMHG | WEIGHT: 237.88 LBS

## 2020-12-22 PROCEDURE — 77030018842 HC SOL IRR SOD CL 9% BAXT -A

## 2020-12-22 PROCEDURE — 74011250637 HC RX REV CODE- 250/637: Performed by: INTERNAL MEDICINE

## 2020-12-22 PROCEDURE — 74011250636 HC RX REV CODE- 250/636: Performed by: INTERNAL MEDICINE

## 2020-12-22 PROCEDURE — 74011636637 HC RX REV CODE- 636/637: Performed by: INTERNAL MEDICINE

## 2020-12-22 PROCEDURE — 99218 HC RM OBSERVATION: CPT

## 2020-12-22 PROCEDURE — 74011000250 HC RX REV CODE- 250: Performed by: STUDENT IN AN ORGANIZED HEALTH CARE EDUCATION/TRAINING PROGRAM

## 2020-12-22 PROCEDURE — 97530 THERAPEUTIC ACTIVITIES: CPT

## 2020-12-22 PROCEDURE — 90471 IMMUNIZATION ADMIN: CPT

## 2020-12-22 PROCEDURE — 90686 IIV4 VACC NO PRSV 0.5 ML IM: CPT | Performed by: INTERNAL MEDICINE

## 2020-12-22 RX ORDER — HYDROCODONE BITARTRATE AND ACETAMINOPHEN 5; 325 MG/1; MG/1
2 TABLET ORAL
Qty: 28 TAB | Refills: 0 | Status: SHIPPED | OUTPATIENT
Start: 2020-12-22 | End: 2020-12-29

## 2020-12-22 RX ORDER — PREDNISONE 10 MG/1
TABLET ORAL
Qty: 43 TAB | Refills: 0 | Status: SHIPPED | OUTPATIENT
Start: 2020-12-23 | End: 2021-01-11

## 2020-12-22 RX ORDER — CALCIUM CARBONATE 200(500)MG
200 TABLET,CHEWABLE ORAL
Status: DISCONTINUED | OUTPATIENT
Start: 2020-12-22 | End: 2020-12-22 | Stop reason: HOSPADM

## 2020-12-22 RX ORDER — LIDOCAINE 40 MG/G
CREAM TOPICAL AS NEEDED
Qty: 5 TUBE | Refills: 1 | Status: ON HOLD | OUTPATIENT
Start: 2020-12-22 | End: 2022-10-21

## 2020-12-22 RX ORDER — COLCHICINE 0.6 MG/1
0.6 TABLET ORAL
Qty: 30 TAB | Refills: 0 | Status: SHIPPED | OUTPATIENT
Start: 2020-12-22 | End: 2021-01-21

## 2020-12-22 RX ADMIN — CALCIUM CARBONATE (ANTACID) CHEW TAB 500 MG 200 MG: 500 CHEW TAB at 13:36

## 2020-12-22 RX ADMIN — PANTOPRAZOLE SODIUM 40 MG: 40 TABLET, DELAYED RELEASE ORAL at 07:03

## 2020-12-22 RX ADMIN — METHADONE HYDROCHLORIDE 140 MG: 10 TABLET ORAL at 09:53

## 2020-12-22 RX ADMIN — Medication 10 ML: at 07:04

## 2020-12-22 RX ADMIN — ALLOPURINOL 100 MG: 100 TABLET ORAL at 09:53

## 2020-12-22 RX ADMIN — ACETAMINOPHEN 650 MG: 325 TABLET ORAL at 04:08

## 2020-12-22 RX ADMIN — PANTOPRAZOLE SODIUM 40 MG: 40 TABLET, DELAYED RELEASE ORAL at 17:04

## 2020-12-22 RX ADMIN — GABAPENTIN 100 MG: 100 CAPSULE ORAL at 15:45

## 2020-12-22 RX ADMIN — DOCUSATE SODIUM 50 MG AND SENNOSIDES 8.6 MG 1 TABLET: 8.6; 5 TABLET, FILM COATED ORAL at 09:53

## 2020-12-22 RX ADMIN — INFLUENZA VIRUS VACCINE 0.5 ML: 15; 15; 15; 15 SUSPENSION INTRAMUSCULAR at 15:43

## 2020-12-22 RX ADMIN — CLOTRIMAZOLE AND BETAMETHASONE DIPROPIONATE: 10; .5 CREAM TOPICAL at 09:55

## 2020-12-22 RX ADMIN — HYDROCODONE BITARTRATE AND ACETAMINOPHEN 2 TABLET: 5; 325 TABLET ORAL at 01:21

## 2020-12-22 RX ADMIN — HYDROCODONE BITARTRATE AND ACETAMINOPHEN 2 TABLET: 5; 325 TABLET ORAL at 07:03

## 2020-12-22 RX ADMIN — LIDOCAINE: 40 CREAM TOPICAL at 10:15

## 2020-12-22 RX ADMIN — LISINOPRIL 20 MG: 20 TABLET ORAL at 09:53

## 2020-12-22 RX ADMIN — PREDNISONE 40 MG: 20 TABLET ORAL at 07:03

## 2020-12-22 RX ADMIN — GABAPENTIN 100 MG: 100 CAPSULE ORAL at 09:53

## 2020-12-22 RX ADMIN — CALCIUM CARBONATE (ANTACID) CHEW TAB 500 MG 200 MG: 500 CHEW TAB at 17:04

## 2020-12-22 RX ADMIN — DAKIN'S SOLUTION 0.125% (QUARTER STRENGTH): 0.12 SOLUTION at 10:15

## 2020-12-22 RX ADMIN — HYDROCODONE BITARTRATE AND ACETAMINOPHEN 2 TABLET: 5; 325 TABLET ORAL at 16:36

## 2020-12-22 RX ADMIN — LEVOTHYROXINE SODIUM 112 MCG: 0.11 TABLET ORAL at 07:03

## 2020-12-22 NOTE — DISCHARGE INSTRUCTIONS
Discharge Instructions       PATIENT ID: Jerel Barriga  MRN: [id number]   YOB: 1965    DATE OF ADMISSION: 12/10/2020 11:49 AM    DATE OF DISCHARGE: 12/22/2020    PRIMARY CARE PROVIDER: None     ATTENDING PHYSICIAN: Janeann Litten, MD  DISCHARGING PROVIDER: Augustine Myers MD    To contact this individual call [phone] and ask the  to page. If unavailable ask to be transferred the Adult Hospitalist Department. DISCHARGE DIAGNOSES   # Traumatic sub arachnoid hemorrhage: Resolved   # Chronic foot ulcer, H/o Pyoderma gangrenosum   # Knee pain gout flare on right knee   Hx of gout, with ikely additional osteoarthritis on both knees   # Chronic methadone use  # Hypothyroidism  # HTN      CONSULTATIONS: IP CONSULT TO PODIATRY  IP CONSULT TO INFECTIOUS DISEASES  IP CONSULT TO ORTHOPEDIC SURGERY    PROCEDURES/SURGERIES: * No surgery found *    PENDING TEST RESULTS:   At the time of discharge the following test results are still pending: None     FOLLOW UP APPOINTMENTS:   Follow-up Information     Follow up With Specialties Details Why Contact Info    None    None ([phone]) Patient stated that they have no PCP      Dermatology    Keep already scheduled appointment     New Primary care   Schedule an appointment as soon as possible for a visit in 1 week Post hospital discharge follow up      Leland Beyer DPM Podiatry  As needed [address] [place] [address]  [phone]             ADDITIONAL CARE RECOMMENDATIONS:   - Please take your medication as prescribed. Don't start or stop medication without talking to your doctor. - You have home wound care, physical and occupation therapy arranged. DIET: Regular diet     ACTIVITY: As tolerated     WOUND CARE:   Recommendation   Saturated dressing with saline to ease dressing removal.   Lidocaine applied.    Redressed as ordered with Adaptic, Dakin's-moist gauze over adaptic, dry gauze to cover, ABD's, Kerlix and ACE wrap.     EQUIPMENT needed: None       Radiology      DISCHARGE MEDICATIONS:   See Medication Reconciliation Form    · It is important that you take the medication exactly as they are prescribed. · Keep your medication in the bottles provided by the pharmacist and keep a list of the medication names, dosages, and times to be taken in your wallet. · Do not take other medications without consulting your doctor. NOTIFY YOUR PHYSICIAN FOR ANY OF THE FOLLOWING:   Fever over 101 degrees for 24 hours. Chest pain, shortness of breath, fever, chills, nausea, vomiting, diarrhea, change in mentation, falling, weakness, bleeding. Severe pain or pain not relieved by medications. Or, any other signs or symptoms that you may have questions about.       DISPOSITION:   x Home With:  x OT x PT x HH  RN       SNF/Inpatient Rehab/LTAC    Independent/assisted living    Hospice    Other:     CDMP Checked:   Yes ***     PROBLEM LIST Updated:  Yes ***       Signed:   Suha Hoff MD  12/22/2020  12:20 PM

## 2020-12-22 NOTE — DISCHARGE SUMMARY
Discharge Summary       PATIENT ID: Gatito Hunter  MRN: [id number]   YOB: 1965    DATE OF ADMISSION: 12/10/2020 11:49 AM    DATE OF DISCHARGE: 12/22/20  PRIMARY CARE PROVIDER: None       DISCHARGING PROVIDER: Hamzah Merchant MD    To contact this individual call [phone] and ask the  to page. If unavailable ask to be transferred the Adult Hospitalist Department. CONSULTATIONS: IP CONSULT TO PODIATRY  IP CONSULT TO INFECTIOUS DISEASES  IP CONSULT TO ORTHOPEDIC SURGERY      PROCEDURES/SURGERIES: * No surgery found *    IMAGING  Ct Head Wo Cont    Result Date: 12/10/2020  IMPRESSION: No acute intracranial hemorrhage visualized. Xr Knee Lt 3 V    Result Date: 12/19/2020  IMPRESSION: Trace effusion. Moderate degenerative changes. Old proximal fibular fracture. Xr Knee Rt 3 V    Result Date: 12/19/2020  IMPRESSION: Trace effusion. Significant tricompartmental DJD. No acute osseous abnormality. [address] COURSE:   # Traumatic sub arachnoid hemorrhage: Resolved   - Repeat CT showed it has been resolved  - Neurosurgery signed off  - Staples removed prior to discharge as per rafaela      # Chronic foot ulcer  H/o Pyoderma gangrenosum   - On IV Zosyn--- completed on 12/17   - Prior history of wound culture positive, Wound cultures ordered , pending   - Podiatry and ID followed appt input   - Podiatry folllowing. No surgical intervention at this time. - Wound care following   - Will need [facility] follow up, to be arranged by Podiatry      # Knee pain with possible gout flare on right knee   Hx of gout, with ikely additional osteoarthritis on both knees   - x-ray remarkable for bilateral DJD  - pain management- tramadol with Tylenol   - no response with NSAIDs, on oral steroid   - Ortho followed appt input- need bilateral knee replacement.  To risky to do steroid joint injection. Recommended pain management and PT.      # Chronic methadone use  - continue home dose     # Hypothyroidism  - Continue synthroid     # HTN  - Continue lisinopril      # Left sided weakness w/ contracture. Chronic       DISCHARGE DIAGNOSES / PLAN:    # Traumatic sub arachnoid hemorrhage: Resolved   # Chronic foot ulcer, H/o Pyoderma gangrenosum   # Knee pain gout flare on right knee   Hx of gout, with ikely additional osteoarthritis on both knees   # Chronic methadone use  # Hypothyroidism  # HTN  # Left sided weakness w/ contracture. Chronic     Patient going home on home PT/OT and [facility] for wound care. Patient doesn't want to go to SNF  Wound care recommendation as below       Patient Active Problem List   Diagnosis Code    Stroke 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    FH: bladder cancer Z80.52    Left foot infection L08.9    Major depression P66.1    Uncomplicated opioid dependence (HCC) F11.20    Chronic obstructive pulmonary disease (HCC) J44.9    Chronic pain disorder G89.4    Hypokalemia E87.6    Seizure disorder (HCC) G40.909    Tobacco abuse Z72.0    Foot ulcer (HCC) L97.509    Nonadherence to medical treatment Z91.19    Sinus bradycardia R00.1    Gout M10.9    Hypothyroidism E03.9    Foot infection L08.9    Depression F32.9  Open wound, lower leg S81.809A    Pyoderma gangrenosum L88    Traumatic subarachnoid hemorrhage (HCC) F34.0X3R    Traumatic subarachnoid hemorrhage without loss of consciousness ([facility] S06.6X0A               PENDING TEST RESULTS:   At the time of discharge the following test results are still pending: None     FOLLOW UP APPOINTMENTS:    Follow-up Information     Follow up With Specialties Details Why Contact Info    None    None ([phone]) Patient stated that they have no PCP      Dermatology    Keep already scheduled appointment     New Primary care   Schedule an appointment as soon as possible for a visit in 1 week Post hospital discharge follow up      Mukesh White DPM Podiatry  As needed [address]  [facility]. [phone]             ADDITIONAL CARE RECOMMENDATIONS:   · It is important that you take the medication exactly as they are prescribed. · Keep your medication in the bottles provided by the pharmacist and keep a list of the medication names, dosages, and times to be taken in your wallet. · Do not take other medications without consulting your doctor. · No drinking alcohol or driving car or operating machinery if you are on narcotic pain medications. Donot take sedating mediations if you are sleepy or confused. · Fall Precautions  · Keep Well Hydrated  · Report to your medical provider if you feel you have  developed allergies to medications  · Follow up with your PCP or Consultant for medication adjustments and refills  · Monitor for signs of fevers,chills,bleeding,chest pain and seek medical attention if you do so. - Please take your medication as prescribed. Don't start or stop medication without talking to your doctor. - You have home wound care, physical and occupation therapy arranged. DIET: Regular diet     ACTIVITY: As tolerated     WOUND CARE:   Recommendation   Saturated dressing with saline to ease dressing removal.   Lidocaine applied. Redressed as ordered with Adaptic, Dakin's-moist gauze over adaptic, dry gauze to cover, ABD's, Kerlix and ACE wrap. EQUIPMENT needed: None           DISCHARGE MEDICATIONS:  Current Discharge Medication List      START taking these medications    Details   HYDROcodone-acetaminophen (NORCO) 5-325 mg per tablet Take 2 Tabs by mouth every six (6) hours as needed for Pain for up to 7 days. Max Daily Amount: 8 Tabs. Qty: 28 Tab, Refills: 0    Associated Diagnoses: Cellulitis of left lower extremity      lidocaine (XYLOCAINE) 4 % topical cream Apply  to affected area as needed for Pain (apply to left lower leg wounds during dressing changes). Qty: 5 Tube, Refills: 1      predniSONE (DELTASONE) 10 mg tablet Take 40 mg by mouth daily (with breakfast) for 4 days, THEN 30 mg daily (with breakfast) for 4 days, THEN 20 mg daily (with breakfast) for 4 days, THEN 10 mg daily (with breakfast) for 7 days. Qty: 43 Tab, Refills: 0         CONTINUE these medications which have CHANGED    Details   colchicine 0.6 mg tablet Take 1 Tab by mouth daily as needed (gout flare) for up to 30 days. Indications: treatment to prevent acute gout attack  Qty: 30 Tab, Refills: 0         CONTINUE these medications which have NOT CHANGED    Details   gabapentin (NEURONTIN) 100 mg capsule Take 100 mg by mouth three (3) times daily. levothyroxine (SYNTHROID) 112 mcg tablet Take 112 mcg by mouth Daily (before breakfast). lisinopriL (PRINIVIL, ZESTRIL) 20 mg tablet Take 20 mg by mouth daily. tamsulosin (FLOMAX) 0.4 mg capsule TAKE ONE CAPSULE BY MOUTH ONE TIME DAILY (after dinner)  Qty: 30 Cap, Refills: 1      aspirin 81 mg chewable tablet Take 81 mg by mouth daily. pantoprazole (PROTONIX) 40 mg tablet Take 40 mg by mouth daily as needed.       CHANTIX STARTING MONTH BOX 0.5 mg (11)- 1 mg (42) DsPk TAKE 1 TABLET BY MOUTH IN MORNING WITH FOOD FOR 3 DAYS THEN INCREASE TO 1 TABLET BY MOUTH TWICE DAILY WITH FOOD THEREAFTER AS DIRECTED ON PA  Refills: 2      allopurinol (ZYLOPRIM) 100 mg tablet Take 1 Tab by mouth daily. Indications: treatment to prevent acute gout attack  Qty: 30 Tab, Refills: 0      senna-docusate (DARELL-COLACE) 8.6-50 mg per tablet Take 1 Tab by mouth daily. methadone (DOLOPHINE) 10 mg/mL solution Take 140 mg by mouth daily. Indications: excessive pain      melatonin 3 mg tablet Take 1 Tab by mouth nightly as needed. Qty: 10 Tab, Refills: 0         STOP taking these medications       lidocaine (XYLOCAINE) 2 % jelly Comments:   Reason for Stopping:         ibuprofen (MOTRIN) 400 mg tablet Comments:   Reason for Stopping:               All Micro Results     Procedure Component Value Units Date/Time    CULTURE, BLOOD, PAIRED [[id number]] Collected: 12/10/20 1655    Order Status: Completed Specimen: Blood Updated: 12/15/20 0541     Special Requests: NO SPECIAL REQUESTS        Culture result: NO GROWTH 5 DAYS       CULTURE, Beti Gold STAIN [[id number]] Collected: 12/14/20 0900    Order Status: Canceled Specimen: Wound from Leg     CULTURE, Beti Gold STAIN [[id number]] Collected: 12/13/20 1200    Order Status: Canceled Specimen: Wound from Ankle     CULTURE, [facility]aez [[id number]] Collected: 12/10/20 1445    Order Status: Canceled Specimen: Wound from Foot           No results found for this or any previous visit (from the past 24 hour(s)). NOTIFY YOUR PHYSICIAN FOR ANY OF THE FOLLOWING:   Fever over 101 degrees for 24 hours. Chest pain, shortness of breath, fever, chills, nausea, vomiting, diarrhea, change in mentation, falling, weakness, bleeding. Severe pain or pain not relieved by medications. Or, any other signs or symptoms that you may have questions about.     DISPOSITION:   x Home With:  x OT x PT x HH  RN       Long term SNF/Inpatient Rehab    Independent/assisted living    Hospice    Other:       PATIENT CONDITION AT DISCHARGE:     Functional status    Poor    x Deconditioned Independent      Cognition     Lucid     Forgetful     Dementia      Catheters/lines (plus indication)    Gross     PICC     PEG    x None      Code status    x Full code     DNR      PHYSICAL EXAMINATION AT DISCHARGE:  Gen:  No distress, alert  HEENT:  Normal cephalic atraumatic, extra-occular movements are intact. Neck:  Supple, No JVD  Lungs:  Clear bilaterally, no wheeze, no rales, normal effort  Heart:  Regular Rate and Rhythm, normal S1 and S2, no edema  Abdomen:  Soft, non tender, normal bowel sounds, no guarding.   Extremities:  Left foot covered with clean dressing, on orhto boot   Neurological:  Awake and alert, CN's are intact, left sided weakness with contracture on both upper/lower extremities   Skin:  No rashes or moles  Mental Status:  Normal thought process, does not appear anxious      CHRONIC MEDICAL DIAGNOSES:  Problem List as of 12/22/2020 Date Reviewed: 9/9/2020          Codes Class Noted - Resolved    Traumatic [facility] hemorrhage ([facility] ICD-10-CM: K03.0K4K  ICD-9-CM: 852.00  12/10/2020 - Present        Traumatic subarachnoid hemorrhage without loss of consciousness ([facility] ICD-10-CM: H56.9F9H  ICD-9-CM: 852.01  12/10/2020 - Present        Pyoderma gangrenosum ICD-10-CM: L88  ICD-9-CM: 686.01  10/30/2020 - Present        Open wound, lower leg ICD-10-CM: O73.388B  ICD-9-CM: 891.0  5/30/2019 - Present        Depression ICD-10-CM: F32.9  ICD-9-CM: 791  5/20/2019 - Present        Foot infection ICD-10-CM: L08.9  ICD-9-CM: 686.9  4/23/2019 - Present        Nonadherence to medical treatment (Chronic) ICD-10-CM: Z91.19  ICD-9-CM: V15.81  10/12/2018 - Present        Sinus bradycardia ICD-10-CM: R00.1  ICD-9-CM: 427.89  10/12/2018 - Present        Gout (Chronic) ICD-10-CM: M10.9  ICD-9-CM: 274.9  10/12/2018 - Present        Hypothyroidism (Chronic) ICD-10-CM: E03.9  ICD-9-CM: 244.9  10/12/2018 - Present        Foot ulcer ([facility] ICD-10-CM: L97.509  ICD-9-CM: 707.15  9/28/2018 - Present        Chronic obstructive pulmonary disease ([facility] ICD-10-CM: J44.9  ICD-9-CM: 496  8/8/2018 - Present        Chronic pain disorder ICD-10-CM: G89.4  ICD-9-CM: 338.4  8/8/2018 - Present        Hypokalemia ICD-10-CM: E87.6  ICD-9-CM: 276.8  8/8/2018 - Present        Seizure disorder ([facility] ICD-10-CM: G40.909  ICD-9-CM: 345.90  8/8/2018 - Present        Tobacco abuse ICD-10-CM: Z72.0  ICD-9-CM: 305.1  8/8/2018 - Present        Major depression ICD-10-CM: F32.9  ICD-9-CM: 296.20  6/6/2018 - Present        Uncomplicated opioid dependence ([place] [address].) ICD-10-CM: F11.20  ICD-9-CM: 304.00  6/6/2018 - Present        Left foot infection ICD-10-CM: L08.9  ICD-9-CM: 686.9  3/13/2018 - Present        Malignant neoplasm of urinary bladder ([place] [address].) ICD-10-CM: C67.9  ICD-9-CM: 188.9  3/6/2018 - Present        FH: bladder cancer ICD-10-CM: Z80.52  ICD-9-CM: V16.52  3/6/2018 - Present        Long term current use of methadone for pain control ICD-10-CM: Z79.891  ICD-9-CM: V58.69  12/27/2017 - Present        Major depressive disorder with current active episode ICD-10-CM: F32.9  ICD-9-CM: 296.30  12/27/2017 - Present        Physical debility ICD-10-CM: R53.81  ICD-9-CM: 799.3  12/27/2017 - Present        HTN (hypertension) ICD-10-CM: I10  ICD-9-CM: 401.9  12/21/2017 - Present        Cellulitis of left lower extremity ICD-10-CM: L03.116  ICD-9-CM: 682.6  12/21/2017 - Present        Drug overdose ICD-10-CM: T50.901A  ICD-9-CM: 977.9, E980.5  10/9/2016 - Present        Thalamic pain syndrome ICD-10-CM: G89.0  ICD-9-CM: 338.0  5/1/2014 - Present        Brain aneurysm ICD-10-CM: I67.1  ICD-9-CM: 437.3  12/30/2013 - Present        Chronic pain ICD-10-CM: G89.29  ICD-9-CM: 338.29  12/30/2013 - Present    Overview Signed 3/6/2018 12:03 PM by Mateo [address] [place] [address]:   [facility] Care  Anomalous Vertebra?    No    Allergies that may influence a procedure:   no    Medications that may influence a procedure:   No    PMSH that may influence a procedure:   no    Diagnostic Tests:  EMG/NCS: not done   MRI: Images independently viewed, agree with report   X-ray: Images independently viewed, agree with report     Prior treatments:  Physical Therapy: Yes  Medications tried: opioids  Outside Records: All outside records that may have been scanned into [facility] have been reviewed and discussed with the patient. Procedures: none    This patient seen in consultation referred by Dr. Jozef Means For neck pain   RECOMMENDATIONS:  - Oxycodone   - Oxycontin  - Discharge from practice    - High risk of cervical interventions discussed (spinal cord injury, paralysis, seizure, stroke, death)    If fails to progress,  - spine surgery consultation for open surgical procedure  - pain medicine consultation if non-operative care chosen    Key diagnostic test images:  reviewed             Neurologic gait dysfunction ICD-10-CM: R26.9  ICD-9-CM: 781.2  12/30/2013 - Present        Spasticity ICD-10-CM: R25.2  ICD-9-CM: 781.0  12/30/2013 - Present        Cerebral infarction [facility]) ICD-10-CM: I63.9  ICD-9-CM: 434.91  12/12/2012 - Present        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  ICD-9-CM: 338.0  12/12/2012 - Present        Cerebral hemorrhage with hemiparesis ([facility] ICD-10-CM: I61.9, G81.90  ICD-9-CM: 222, 342.90  4/15/2011 - Present        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  ICD-9-CM: 338.0  4/15/2011 - Present        Stroke ([facility] ICD-10-CM: I63.9  ICD-9-CM: 434.91  3/11/2011 - Present        Hypertension ICD-10-CM: I10  ICD-9-CM: 401.9  3/11/2011 - Present        Thromboembolism ([facility] ICD-10-CM: I74.9  ICD-9-CM: 444.9  3/11/2011 - Present        RESOLVED: Depressive disorder ICD-10-CM: F32.9  ICD-9-CM: 038  8/1/2018 - 8/2/2018                Discussed with patient and family. Explained the importance of following up, Compliance with medications and recommendations on discharge,Side effect profile of medications were explained.  Safety precautions at home and while taking pain medications also explained. All questions answered to the satisfaction of the patient/family. Discussed with consultant(s) who are agreeable to the discharge. Verbal and written instructions on discharge given. Explained about Discharge medications and side effect profile. Advised patient/family to followup with their pcp for medication refills and preauthorization of medications, Home health orders. checkups,screenign programs as appropriate for age. Thank you None for taking care of your patient, Please call with any questions. Greater than 35 minutes were spent with the patient on counseling and coordination of care    Signed:   Rusty Jordan MD  12/22/2020  12:26 PM    Please note that this dictation was completed with [employer], the computer voice recognition software. Quite often unanticipated grammatical, syntax, homophones, and other interpretive errors are inadvertently transcribed by the computer software. Please disregard these errors. Please excuse any errors that have escaped final proofreading. Thank you.

## 2020-12-22 NOTE — WOUND CARE
WOCN Note:     Follow-up visit for left lower leg wounds. Previously seen by TORO Sharma. Chart shows:  Admitted for subdural hematoma and fall  History of methadone usage, bladder cancer, CVA w/ left side flaccid. Assessment:   Appropriately conversational and reports pain in left leg with dressing change. Pre-medicated by RN and Lidocaine applied. Able to transfer from bed to recliner. Continent. 1. POA Left lower dorsal foot to toes and circumferential lower leg, suspected pyoderma gangrenosum per podiatry note:  95% moist tan and brown 5% moist pink; moderate serous exudate; no odor; no erythema.  Tender to touch.  Patient declining further debridement. Essentially unchanged since previous WO assessment. Saturated dressing with saline to ease dressing removal.   Lidocaine applied. Redressed as ordered with Adaptic, Dakin's-moist gauze over adaptic, dry gauze to cover, ABD's, Kerlix and ACE wrap. Wound Recommendations:    Continue as ordered. Discussed with RN, David Rivero.     Transition of Care: Plan to follow weekly and as needed while admitted to hospital.     CYNTHIA JiménezN, RN, [employer]  Certified Wound, Ostomy, Continence Nurse  office [phone]  pager [phone] or call  to page

## 2020-12-22 NOTE — PROGRESS NOTES
Pt Refused to remove staples stated he will do when he visits his doctors office day after tomorrow.

## 2020-12-22 NOTE — ROUTINE PROCESS
Unable to schedule BRIGID PCP appt as the patient does not have a provider listed and declined set up services.

## 2020-12-22 NOTE — PROGRESS NOTES
BRIGID-TBD  RUR-21% Moderate    [facility]  has accepted patient for continuation of [facility] services. Voicemail left for friend Gabrielle Balloon [phone] to see if patient's hotel room is still available or if arrangements can be made for him to return. Cm left voice message for Jimy Avery at [facility] as referral is still pending-however patient is currently refusing. CM to monitor. Shirin Blunt, MS    1:25 CM notes [facility] has declined to accept patient. Shirin Blunt, MS    1:55 Patient states that his friend Gabrielle Balloon will be providing transportation home today to his address at [address]

## 2020-12-22 NOTE — PROGRESS NOTES
PCT informed PT that patient wishes to speak to Therapy. Pt found supine stating that he will be leaving at this time and needs a shower cover for LLE. Informed pt that we do not have this. Pt insistent on DC, does not want skilled therapy. Noted full WB and noncompliance with precautions and poor awareness of LLE safety measures. Pt does not want to participate in therapy at this time, left pt with call abbasi.      Jazmin Ashraf, DPT, PT

## 2020-12-22 NOTE — PROGRESS NOTES
RUR- Observation Patient    BRIGID- Home to [facility] today via friend Isatu Rojo [phone]. [facility] is set up to resume Skilled Nursing and PT/OT. CM spoke with the patient and the friend Isatu Rojo on a 3 way call. The friend helps to manage the patient's care. The patient does not want to go to rehab or SNF at this time. He is requesting to be discharged. CM confirmed ride home with the friend/Hotel room. The friend would not give the address of the hotel room. She stated that [facility] is resuming home care and have the address. CM went over the PT and OT notes with the patient's friend to explain level of care. The patient's friend will take the patient to his methadone clinic tomorrow. CM provided copy's MAR's for the clinic and wound care notes as requested.      Arch Decree, [address]

## 2020-12-22 NOTE — PROGRESS NOTES
Problem: Self Care Deficits Care Plan (Adult)  Goal: *Acute Goals and Plan of Care (Insert Text)  Description: FUNCTIONAL STATUS PRIOR TO ADMISSION: Very poor historian. Reports that he was living at a hotel? And then home alone and staying on the 1st floor with use of old w/c from a wound clinic? Essentially unable to get up from the couch. Per chart, at last recent admission, a L BKA was recommended and he refused; was to be NWB but also refused/was unable to maintain that status. HOME SUPPORT PRIOR TO ADMISSION: Details unknown - very poor historian but sounds like he had very limited assistance; per chart lives with friend/mPOA who provides assistance. Occupational Therapy Goals  Initiated 12/11/2020  1. Patient will perform grooming, seated EOB, with supervision/set-up using compensatory strategies PRN within 7 day(s). GOAL MET  2. Patient will perform upper body dressing with supervision/set-up using noe technique within 7 day(s). CONT  3. Patient will perform lower body dressing with minimal assistance using AE/compensatory strategies PRN within 7 day(s). CONT  4. Patient will perform lateral t/f to [facility] with maximal assistance within 7 day(s). CONT  5. Patient will perform all aspects of toileting with minimal assistance/contact guard assist within 7 day(s). CONT  6. Patient will participate in upper extremity therapeutic exercise/activities with supervision/set-up for 5 minutes within 7 day(s). CONT  7. Patient will utilize energy conservation techniques during functional activities with verbal cues within 7 day(s).  CONT    Outcome: Not Progressing Towards Goal    OCCUPATIONAL THERAPY TREATMENT  Patient: Christy Jamison (89 y.o. male)  Date: 12/22/2020  Diagnosis: SAH (subarachnoid hemorrhage) ([facility]) [I60.9]  SAH ([facility]arachnoid hemorrhage) ([facility] [I60.9] <principal problem not specified>       Precautions:  Falls, Impulsivity  Chart, occupational therapy assessment, plan of care, and goals were reviewed. ASSESSMENT  Patient continues with skilled OT services and is not progressing towards goals. Pt continues to demonstrated decreased safety awareness and insight into deficits, with pt's self-limiting behaviors and impulsivity limiting pt's safe functional mobility/balance. Pt refused assistance with standing pivot turn transfer from armchair to EOB and was noted with poor safety, essentially partially pivoting and falling into bed. Pt refused Ot's attempts at safe transfer training and stated, \"I'll be fine once I stretch my leg out a little more. \"  Pt educated at length on recommendation for rehab placement s/p discharge from acute hospitalization 2/2 not safe for return home at this time 2/2 continued high fall risk, poor safety awareness and poor insight into deficits; however, poor understanding continued to be demonstrated 2/2 pt continuing to state, \"I'll be home for [place]. \"      Current Level of Function Impacting Discharge (ADLs): Max A-Total A    Other factors to consider for discharge: poor safety awareness, poor insight into deficits, high fall risk         PLAN :  Patient continues to benefit from skilled intervention to address the above impairments. Continue treatment per established plan of care. to address goals.     Recommend with staff: Frequent positional changes, Assist x1 to/from armchair, Active ADL engagement    Recommend next OT session: POC progression    Recommendation for discharge: (in order for the patient to meet his/her long term goals)  Therapy up to 5 days/week in SNF setting    This discharge recommendation:  Has been made in collaboration with the attending provider and/or case management    IF patient discharges home will need the following DME: wheelchair, hospital bed and ramp- pt will also required 24/7 ADL/IADL assist with Max-Total A provided       SUBJECTIVE:   Patient stated i'm going to be home for [place]    OBJECTIVE DATA SUMMARY: Cognitive/Behavioral Status:  Neurologic State: Alert  Orientation Level: Oriented X4  Cognition: Decreased attention/concentration;Decreased command following; Impulsive;Poor safety awareness  Perception: Appears intact  Perseveration: Perseverates during ADLS;Perseverates during conversation;Perseverates during mobility(On fact he will be home for [place])  Safety/Judgement: Decreased awareness of environment;Decreased awareness of need for assistance;Decreased awareness of need for safety;Decreased insight into deficits    Functional Mobility and Transfers for ADLs:  Transfers:  Sit to Stand: Stand-by assistance  Bed to Chair: Stand-by assistance(not safe, resistive to safety cues)    Balance:  Sitting: Intact  Standing: Impaired  Standing - Static: Fair  Standing - Dynamic : Fair    ADL Intervention:  Lower Body Dressing Assistance  Socks: Total assistance (dependent)  Leg Crossed Method Used: No  Position Performed: Seated in chair  Cues: Physical assistance;Verbal cues provided(for safety)    Cognitive Retraining  Safety/Judgement: Decreased awareness of environment;Decreased awareness of need for assistance;Decreased awareness of need for safety;Decreased insight into deficits    Pt educated at length on recommendation for skilled rehab upon discharge from acute hospitalization 2/2 continued high fall risk, as well as, decreased safety awareness and insight into deficits; poor understanding verbalized as pt states he is going home and \"not to rehab. \"    Pain:  No c/o pain    Activity Tolerance:   Fair and requires frequent rest breaks    After treatment patient left in no apparent distress:   Supine in bed, Call bell within reach, Bed / chair alarm activated, and Side rails x 3    COMMUNICATION/COLLABORATION:   The patients plan of care was discussed with: Physical therapist, Registered nurse, and Case management.      Kareem Evangelista OT  Time Calculation: 29 mins

## 2020-12-22 NOTE — PROGRESS NOTES
Hospitalist Progress Note  Rowena Simmonds, MD  Answering service: [phone] from in house phone        Date of Service:  2020  NAME:  Merari Oliveira  :  1965  MRN:  [id number]      Admission Summary:      Merari Oliveira is a 53 y.o. male who presents with      As per initial admission summary  HPI the patient is a transfer from [facility] for treatment/observation of a traumatic subarachnoid hemorrhage and also treatment of a left leg wound infection.  The patient fell last night around midnight, hitting his head. Stacie Krause has a laceration to the left parietal area that has been stapled.  The patient complains of a generalized headache which is unchanged since the fall.  He also has a chronic wound to his left leg/foot that appears to be secondarily infected.  The physician at [facility] spoke with the intensivist here and they accepted the patient in transfer.     On my HP. Patient with past medical history significant for chronic left foot ulcers since at least 2018 on chronic methadone and followed by behavioral health clinic history of stroke in  patient was transferred from [facility] as per documentation he does not want to go over there patient had a fall overnight and hit his head there is a laceration to the left parietal area that has been scattered patient complained of generalized headache and also concerned about chronic wound on the left foot patient was initially accepted to the ICU but repeat imaging here at [facility] did not show any bleeding patient does not report any fever, chest pain, shortness of breath or any other associated symptoms. He told me he lives by himself and one of his friend helps him with the dressing of the wound.   As per documentation patient was previously recommended amputation by the plastic surgery but he refused admitted to hospitalist service podiatry and infectious disease consulted       Interval history / Subjective:   Patient seen and examined the day of service. Patient felt better after starting the prednisone. He said he was able to get up and sitting on a chair. Awaiting placement. Assessment & Plan:     # Traumatic sub arachnoid hemorrhage: Resolved   - Repeat CT showed it has been resolved  - Neurosurgery signed off  - Staples need to be removed prior to discharge as per rafaela      # Chronic foot ulcer  H/o Pyoderma gangrenosum   - On IV Zosyn--- completed on 12/17   - Prior history of wound culture positive, Wound cultures ordered , pending   - Podiatry and ID followed appt input   - Podiatry folllowing. No surgical intervention at this time. - Wound care following   - Will need [facility] follow up, to be arranged by Podiatry     # Knee pain with possible gout flare on right knee   Hx of gout, with ikely additional osteoarthritis on both knees   - x-ray remarkable for bilateral DJD  - pain management- tramadol with Tylenol   - no response with NSAIDs, on oral steroid   - Ortho followed appt input- need bilateral knee replacement. To risky to do steroid joint injection. Recommended pain management and PT.      # Chronic methadone use  - continue home dose     # Hypothyroidism  - Continue synthroid     # HTN  - Continue lisinopril       Code status: full code   DVT prophylaxis: scds    Care Plan discussed with: Patient/Family  Disposition: SNF/LTC and TBD     Hospital Problems  Date Reviewed: 9/9/2020          Codes Class Noted POA    Traumatic subarachnoid hemorrhage ([facility] ICD-10-CM: Q25.1I2K  ICD-9-CM: 852.00  12/10/2020 Unknown        Traumatic subarachnoid hemorrhage without loss of consciousness ([facility] ICD-10-CM: X86.1C6W  ICD-9-CM: 852.01  12/10/2020 Yes                Review of Systems:   A comprehensive review of systems was negative except for that written in the HPI.        Vital Signs:    Last 24hrs VS reviewed since prior progress note. Most recent are:  Visit Vitals  BP (!) 156/98   Pulse 67   Temp 97.6 °F (36.4 °C)   Resp 18   Ht 6' 0.84\" (1.85 m)   Wt 107.9 kg (237 lb 14 oz)   SpO2 96%   BMI 31.53 kg/m²       No intake or output data in the 24 hours ending 12/22/20 1228     Physical Examination:   I had a face to face encounter with this patient and independently examined them on 12/21/2020 as outlined below:        General:  Alert, cooperative, no distress, appears stated age. Head:  Normocephalic, staples left parietal area    Lungs:   Clear to auscultation bilaterally. Chest wall:  No tenderness or deformity. Heart:  Regular rate and rhythm, S1, S2 normal, no murmur, click, rub or gallop. Abdomen:   Soft, non-tender. Bowel sounds normal. No masses,  No organomegaly. Extremities: Chronic wound left foot covered with dressing    Pulses: 2+ and symmetric all extremities. Neurologic: CNII-XII intact. Normal strength, sensation and reflexes throughout. Data Review:   I personally reviewed labs and imaging       Labs:     No results for input(s): WBC, HGB, HCT, PLT, HGBEXT, HCTEXT, PLTEXT, HGBEXT, HCTEXT, PLTEXT in the last 72 hours. No results for input(s): NA, K, CL, CO2, BUN, CREA, GLU, CA, MG, PHOS, URICA in the last 72 hours. No results for input(s): ALT, AP, TBIL, TBILI, TP, ALB, GLOB, GGT, AML, LPSE in the last 72 hours. No lab exists for component: SGOT, GPT, AMYP, HLPSE  No results for input(s): INR, PTP, APTT, INREXT, INREXT in the last 72 hours. No results for input(s): FE, TIBC, PSAT, FERR in the last 72 hours. Lab Results   Component Value Date/Time    Folate 8.6 12/13/2012 03:20 AM      No results for input(s): PH, PCO2, PO2 in the last 72 hours. No results for input(s): CPK, CKNDX, TROIQ in the last 72 hours.     No lab exists for component: CPKMB  Lab Results   Component Value Date/Time    Cholesterol, total 152 12/13/2012 03:30 AM    HDL Cholesterol 26 12/13/2012 03:30 AM    LDL, calculated 92.2 12/13/2012 03:30 AM    Triglyceride 169 (H) 12/13/2012 03:30 AM    CHOL/HDL Ratio 5.8 (H) 12/13/2012 03:30 AM     Lab Results   Component Value Date/Time    Glucose (POC) 150 (H) 03/19/2018 11:45 AM    Glucose (POC) 123 (H) 03/18/2018 08:55 PM    Glucose (POC) 96 03/16/2018 04:58 PM    Glucose (POC) 116 (H) 12/12/2012 12:42 PM    Glucose (POC) 99 12/11/2012 11:27 PM     Lab Results   Component Value Date/Time    Color YELLOW/STRAW 05/20/2019 05:52 AM    Appearance CLEAR 05/20/2019 05:52 AM    Specific gravity 1.011 05/20/2019 05:52 AM    pH (UA) 5.5 05/20/2019 05:52 AM    Protein NEGATIVE  05/20/2019 05:52 AM    Glucose NEGATIVE  05/20/2019 05:52 AM    Ketone NEGATIVE  05/20/2019 05:52 AM    Bilirubin NEGATIVE  05/20/2019 05:52 AM    Urobilinogen 0.2 05/20/2019 05:52 AM    Nitrites NEGATIVE  05/20/2019 05:52 AM    Leukocyte Esterase NEGATIVE  05/20/2019 05:52 AM    Epithelial cells FEW 05/20/2019 05:52 AM    Bacteria NEGATIVE  05/20/2019 05:52 AM    WBC 0-4 05/20/2019 05:52 AM    RBC 0-5 05/20/2019 05:52 AM         Medications Reviewed:     Current Facility-Administered Medications   Medication Dose Route Frequency    predniSONE (DELTASONE) tablet 40 mg  40 mg Oral DAILY WITH BREAKFAST    traMADoL (ULTRAM) tablet 50 mg  50 mg Oral Q6H PRN    clotrimazole-betamethasone (LOTRISONE) 1-0.05 % cream   Topical BID    HYDROcodone-acetaminophen (NORCO) 5-325 mg per tablet 2 Tab  2 Tab Oral Q6H PRN    sodium hypochlorite (QUARTER STRENGTH DAKIN'S) 0.125% irrigation (bottle)   Topical DAILY    diphenhydrAMINE (BENADRYL) capsule 25 mg  25 mg Oral Q6H PRN    lidocaine (XYLOCAINE) 4 % cream   Topical PRN    sodium chloride (NS) flush 5-40 mL  5-40 mL IntraVENous Q8H    sodium chloride (NS) flush 5-40 mL  5-40 mL IntraVENous PRN    acetaminophen (TYLENOL) tablet 650 mg  650 mg Oral Q6H PRN    Or    acetaminophen (TYLENOL) suppository 650 mg  650 mg Rectal Q6H PRN    polyethylene glycol (MIRALAX) packet 17 g  17 g Oral DAILY PRN    promethazine (PHENERGAN) tablet 12.5 mg  12.5 mg Oral Q6H PRN    Or    ondansetron (ZOFRAN) injection 4 mg  4 mg IntraVENous Q6H PRN    colchicine tablet 0.6 mg  0.6 mg Oral DAILY PRN    gabapentin (NEURONTIN) capsule 100 mg  100 mg Oral TID    levothyroxine (SYNTHROID) tablet 112 mcg  112 mcg Oral ACB    lisinopriL (PRINIVIL, ZESTRIL) tablet 20 mg  20 mg Oral DAILY    melatonin tablet 3 mg  3 mg Oral QHS PRN    methadone (DOLOPHINE) tablet 140 mg  140 mg Oral DAILY    pantoprazole (PROTONIX) tablet 40 mg  40 mg Oral ACB&D    tamsulosin (FLOMAX) capsule 0.4 mg  0.4 mg Oral PCD    allopurinoL (ZYLOPRIM) tablet 100 mg  100 mg Oral DAILY    senna-docusate (PERICOLACE) 8.6-50 mg per tablet 1 Tab  1 Tab Oral DAILY     ______________________________________________________________________  EXPECTED LENGTH OF STAY: 3d 2h  ACTUAL LENGTH OF STAY:          1                 Monica Kent MD

## 2021-04-15 ENCOUNTER — APPOINTMENT (OUTPATIENT)
Dept: GENERAL RADIOLOGY | Age: 56
DRG: 720 | End: 2021-04-15
Attending: EMERGENCY MEDICINE
Payer: COMMERCIAL

## 2021-04-15 ENCOUNTER — APPOINTMENT (OUTPATIENT)
Dept: MRI IMAGING | Age: 56
DRG: 720 | End: 2021-04-15
Attending: INTERNAL MEDICINE
Payer: COMMERCIAL

## 2021-04-15 ENCOUNTER — TELEPHONE (OUTPATIENT)
Dept: ONCOLOGY | Age: 56
End: 2021-04-15

## 2021-04-15 ENCOUNTER — HOSPITAL ENCOUNTER (INPATIENT)
Age: 56
LOS: 14 days | Discharge: HOME | DRG: 720 | End: 2021-04-29
Attending: EMERGENCY MEDICINE | Admitting: INTERNAL MEDICINE
Payer: COMMERCIAL

## 2021-04-15 DIAGNOSIS — I60.9 SUBARACHNOID HEMORRHAGE (HCC): ICD-10-CM

## 2021-04-15 DIAGNOSIS — S81.802A MULTIPLE OPEN WOUNDS OF LOWER LEG, LEFT, INITIAL ENCOUNTER: ICD-10-CM

## 2021-04-15 DIAGNOSIS — Z85.51 HISTORY OF BLADDER CANCER: ICD-10-CM

## 2021-04-15 DIAGNOSIS — Z16.12 INFECTION DUE TO ESBL-PRODUCING ESCHERICHIA COLI: ICD-10-CM

## 2021-04-15 DIAGNOSIS — R53.81 PHYSICAL DEBILITY: ICD-10-CM

## 2021-04-15 DIAGNOSIS — Z79.891 LONG TERM CURRENT USE OF METHADONE FOR PAIN CONTROL: ICD-10-CM

## 2021-04-15 DIAGNOSIS — S91.302A WOUND OF LEFT FOOT: ICD-10-CM

## 2021-04-15 DIAGNOSIS — A49.8 INFECTION DUE TO ESBL-PRODUCING ESCHERICHIA COLI: ICD-10-CM

## 2021-04-15 DIAGNOSIS — L88 PYODERMA GANGRENOSUM: ICD-10-CM

## 2021-04-15 DIAGNOSIS — G89.4 CHRONIC PAIN DISORDER: ICD-10-CM

## 2021-04-15 DIAGNOSIS — E03.9 ACQUIRED HYPOTHYROIDISM: Chronic | ICD-10-CM

## 2021-04-15 DIAGNOSIS — L08.9 FOOT INFECTION: ICD-10-CM

## 2021-04-15 DIAGNOSIS — M86.162: ICD-10-CM

## 2021-04-15 DIAGNOSIS — G81.90 CEREBRAL HEMORRHAGE WITH HEMIPARESIS (HCC): ICD-10-CM

## 2021-04-15 DIAGNOSIS — I61.9 CEREBRAL HEMORRHAGE WITH HEMIPARESIS (HCC): ICD-10-CM

## 2021-04-15 DIAGNOSIS — L08.9 INFECTED WOUND: ICD-10-CM

## 2021-04-15 DIAGNOSIS — L03.116 CELLULITIS OF LEFT LOWER EXTREMITY: ICD-10-CM

## 2021-04-15 DIAGNOSIS — A41.9 SEPSIS WITHOUT ACUTE ORGAN DYSFUNCTION, DUE TO UNSPECIFIED ORGANISM (HCC): ICD-10-CM

## 2021-04-15 DIAGNOSIS — Z87.2 HISTORY OF PYODERMA GANGRENOSUM: ICD-10-CM

## 2021-04-15 DIAGNOSIS — A41.9 SEPSIS, DUE TO UNSPECIFIED ORGANISM, UNSPECIFIED WHETHER ACUTE ORGAN DYSFUNCTION PRESENT (HCC): Primary | ICD-10-CM

## 2021-04-15 DIAGNOSIS — I67.1 BRAIN ANEURYSM: ICD-10-CM

## 2021-04-15 DIAGNOSIS — R29.898 TRANSIENT WEAKNESS OF LEFT LOWER EXTREMITY: ICD-10-CM

## 2021-04-15 DIAGNOSIS — G40.909 SEIZURE DISORDER (HCC): ICD-10-CM

## 2021-04-15 DIAGNOSIS — A49.8 PSEUDOMONAS INFECTION: ICD-10-CM

## 2021-04-15 DIAGNOSIS — T14.8XXA INFECTED WOUND: ICD-10-CM

## 2021-04-15 DIAGNOSIS — G89.0 CENTRAL PAIN SYNDROME: ICD-10-CM

## 2021-04-15 DIAGNOSIS — B95.2 ENTEROCOCCUS FAECALIS INFECTION: ICD-10-CM

## 2021-04-15 LAB
ALBUMIN SERPL-MCNC: 2.8 G/DL (ref 3.5–5)
ALBUMIN/GLOB SERPL: 0.8 {RATIO} (ref 1.1–2.2)
ALP SERPL-CCNC: 120 U/L (ref 45–117)
ALT SERPL-CCNC: 42 U/L (ref 12–78)
ANION GAP SERPL CALC-SCNC: 3 MMOL/L (ref 5–15)
APPEARANCE UR: CLEAR
AST SERPL-CCNC: 16 U/L (ref 15–37)
ATRIAL RATE: 95 BPM
BASOPHILS # BLD: 0.3 K/UL (ref 0–0.1)
BASOPHILS NFR BLD: 1 % (ref 0–1)
BILIRUB SERPL-MCNC: 0.6 MG/DL (ref 0.2–1)
BILIRUB UR QL: NEGATIVE
BUN SERPL-MCNC: 5 MG/DL (ref 6–20)
BUN/CREAT SERPL: 7 (ref 12–20)
CALCIUM SERPL-MCNC: 8.7 MG/DL (ref 8.5–10.1)
CALCULATED P AXIS, ECG09: 45 DEGREES
CALCULATED R AXIS, ECG10: 23 DEGREES
CALCULATED T AXIS, ECG11: 50 DEGREES
CHLORIDE SERPL-SCNC: 97 MMOL/L (ref 97–108)
CO2 SERPL-SCNC: 33 MMOL/L (ref 21–32)
COLOR UR: NORMAL
COMMENT, HOLDF: NORMAL
COVID-19 RAPID TEST, COVR: NOT DETECTED
CREAT SERPL-MCNC: 0.75 MG/DL (ref 0.7–1.3)
CRP SERPL-MCNC: 15.3 MG/DL (ref 0–0.6)
DIAGNOSIS, 93000: NORMAL
DIFFERENTIAL METHOD BLD: ABNORMAL
EOSINOPHIL # BLD: 0 K/UL (ref 0–0.4)
EOSINOPHIL NFR BLD: 0 % (ref 0–7)
ERYTHROCYTE [DISTWIDTH] IN BLOOD BY AUTOMATED COUNT: 18.7 % (ref 11.5–14.5)
ERYTHROCYTE [SEDIMENTATION RATE] IN BLOOD: 58 MM/HR (ref 0–20)
GLOBULIN SER CALC-MCNC: 3.5 G/DL (ref 2–4)
GLUCOSE SERPL-MCNC: 142 MG/DL (ref 65–100)
GLUCOSE UR STRIP.AUTO-MCNC: NEGATIVE MG/DL
HCT VFR BLD AUTO: 37.1 % (ref 36.6–50.3)
HGB BLD-MCNC: 12 G/DL (ref 12.1–17)
HGB UR QL STRIP: NEGATIVE
IMM GRANULOCYTES # BLD AUTO: 0 K/UL (ref 0–0.04)
IMM GRANULOCYTES NFR BLD AUTO: 0 % (ref 0–0.5)
KETONES UR QL STRIP.AUTO: NEGATIVE MG/DL
LACTATE BLD-SCNC: 1.11 MMOL/L (ref 0.4–2)
LEUKOCYTE ESTERASE UR QL STRIP.AUTO: NEGATIVE
LYMPHOCYTES # BLD: 1.3 K/UL (ref 0.8–3.5)
LYMPHOCYTES NFR BLD: 5 % (ref 12–49)
MCH RBC QN AUTO: 28.7 PG (ref 26–34)
MCHC RBC AUTO-ENTMCNC: 32.3 G/DL (ref 30–36.5)
MCV RBC AUTO: 88.8 FL (ref 80–99)
MONOCYTES # BLD: 1.5 K/UL (ref 0–1)
MONOCYTES NFR BLD: 6 % (ref 5–13)
NEUTS BAND NFR BLD MANUAL: 3 %
NEUTS SEG # BLD: 22.1 K/UL (ref 1.8–8)
NEUTS SEG NFR BLD: 85 % (ref 32–75)
NITRITE UR QL STRIP.AUTO: NEGATIVE
NRBC # BLD: 0 K/UL (ref 0–0.01)
NRBC BLD-RTO: 0 PER 100 WBC
P-R INTERVAL, ECG05: 148 MS
PH UR STRIP: 7 [PH] (ref 5–8)
PLATELET # BLD AUTO: 215 K/UL (ref 150–400)
PMV BLD AUTO: 10.1 FL (ref 8.9–12.9)
POTASSIUM SERPL-SCNC: 4.6 MMOL/L (ref 3.5–5.1)
PROT SERPL-MCNC: 6.3 G/DL (ref 6.4–8.2)
PROT UR STRIP-MCNC: NEGATIVE MG/DL
Q-T INTERVAL, ECG07: 380 MS
QRS DURATION, ECG06: 86 MS
QTC CALCULATION (BEZET), ECG08: 477 MS
RBC # BLD AUTO: 4.18 M/UL (ref 4.1–5.7)
RBC MORPH BLD: ABNORMAL
SAMPLES BEING HELD,HOLD: NORMAL
SODIUM SERPL-SCNC: 133 MMOL/L (ref 136–145)
SOURCE, COVRS: NORMAL
SP GR UR REFRACTOMETRY: 1.01 (ref 1–1.03)
UROBILINOGEN UR QL STRIP.AUTO: 1 EU/DL (ref 0.2–1)
VENTRICULAR RATE, ECG03: 95 BPM
WBC # BLD AUTO: 25.2 K/UL (ref 4.1–11.1)

## 2021-04-15 PROCEDURE — 87040 BLOOD CULTURE FOR BACTERIA: CPT

## 2021-04-15 PROCEDURE — 83605 ASSAY OF LACTIC ACID: CPT

## 2021-04-15 PROCEDURE — 86140 C-REACTIVE PROTEIN: CPT

## 2021-04-15 PROCEDURE — 87635 SARS-COV-2 COVID-19 AMP PRB: CPT

## 2021-04-15 PROCEDURE — 96365 THER/PROPH/DIAG IV INF INIT: CPT

## 2021-04-15 PROCEDURE — 96375 TX/PRO/DX INJ NEW DRUG ADDON: CPT

## 2021-04-15 PROCEDURE — 85652 RBC SED RATE AUTOMATED: CPT

## 2021-04-15 PROCEDURE — 74011250636 HC RX REV CODE- 250/636: Performed by: EMERGENCY MEDICINE

## 2021-04-15 PROCEDURE — 74011000258 HC RX REV CODE- 258: Performed by: EMERGENCY MEDICINE

## 2021-04-15 PROCEDURE — 74011000250 HC RX REV CODE- 250: Performed by: EMERGENCY MEDICINE

## 2021-04-15 PROCEDURE — 74011250636 HC RX REV CODE- 250/636: Performed by: INTERNAL MEDICINE

## 2021-04-15 PROCEDURE — 36415 COLL VENOUS BLD VENIPUNCTURE: CPT

## 2021-04-15 PROCEDURE — 81003 URINALYSIS AUTO W/O SCOPE: CPT

## 2021-04-15 PROCEDURE — 71045 X-RAY EXAM CHEST 1 VIEW: CPT

## 2021-04-15 PROCEDURE — 93005 ELECTROCARDIOGRAM TRACING: CPT

## 2021-04-15 PROCEDURE — 74011250637 HC RX REV CODE- 250/637: Performed by: INTERNAL MEDICINE

## 2021-04-15 PROCEDURE — 99285 EMERGENCY DEPT VISIT HI MDM: CPT

## 2021-04-15 PROCEDURE — 85025 COMPLETE CBC W/AUTO DIFF WBC: CPT

## 2021-04-15 PROCEDURE — 65660000000 HC RM CCU STEPDOWN

## 2021-04-15 PROCEDURE — 80053 COMPREHEN METABOLIC PANEL: CPT

## 2021-04-15 PROCEDURE — 74011000258 HC RX REV CODE- 258: Performed by: INTERNAL MEDICINE

## 2021-04-15 PROCEDURE — 2709999900 HC NON-CHARGEABLE SUPPLY

## 2021-04-15 PROCEDURE — 73610 X-RAY EXAM OF ANKLE: CPT

## 2021-04-15 PROCEDURE — 77030040718 HC DRSG HYDRGEL SKINTEGRITY MDII -A

## 2021-04-15 PROCEDURE — 96366 THER/PROPH/DIAG IV INF ADDON: CPT

## 2021-04-15 PROCEDURE — 73600 X-RAY EXAM OF ANKLE: CPT

## 2021-04-15 RX ORDER — LORAZEPAM 2 MG/ML
1 INJECTION INTRAMUSCULAR ONCE
Status: COMPLETED | OUTPATIENT
Start: 2021-04-15 | End: 2021-04-15

## 2021-04-15 RX ORDER — GUAIFENESIN 100 MG/5ML
81 LIQUID (ML) ORAL DAILY
Status: DISCONTINUED | OUTPATIENT
Start: 2021-04-15 | End: 2021-04-15

## 2021-04-15 RX ORDER — LEVOTHYROXINE SODIUM 112 UG/1
112 TABLET ORAL
Status: DISCONTINUED | OUTPATIENT
Start: 2021-04-16 | End: 2021-04-16

## 2021-04-15 RX ORDER — VARENICLINE TARTRATE 0.5 MG/1
1 TABLET, FILM COATED ORAL
Status: DISCONTINUED | OUTPATIENT
Start: 2021-04-23 | End: 2021-04-29 | Stop reason: HOSPADM

## 2021-04-15 RX ORDER — KETOROLAC TROMETHAMINE 30 MG/ML
30 INJECTION, SOLUTION INTRAMUSCULAR; INTRAVENOUS
Status: COMPLETED | OUTPATIENT
Start: 2021-04-15 | End: 2021-04-15

## 2021-04-15 RX ORDER — GABAPENTIN 100 MG/1
100 CAPSULE ORAL 3 TIMES DAILY
Status: DISCONTINUED | OUTPATIENT
Start: 2021-04-15 | End: 2021-04-29 | Stop reason: HOSPADM

## 2021-04-15 RX ORDER — OXYCODONE AND ACETAMINOPHEN 5; 325 MG/1; MG/1
1 TABLET ORAL
Status: DISCONTINUED | OUTPATIENT
Start: 2021-04-15 | End: 2021-04-15

## 2021-04-15 RX ORDER — SODIUM CHLORIDE 9 MG/ML
125 INJECTION, SOLUTION INTRAVENOUS CONTINUOUS
Status: DISCONTINUED | OUTPATIENT
Start: 2021-04-15 | End: 2021-04-17

## 2021-04-15 RX ORDER — ALLOPURINOL 100 MG/1
100 TABLET ORAL DAILY
Status: DISCONTINUED | OUTPATIENT
Start: 2021-04-15 | End: 2021-04-21

## 2021-04-15 RX ORDER — FENTANYL CITRATE 50 UG/ML
50 INJECTION, SOLUTION INTRAMUSCULAR; INTRAVENOUS
Status: COMPLETED | OUTPATIENT
Start: 2021-04-15 | End: 2021-04-15

## 2021-04-15 RX ORDER — VARENICLINE TARTRATE 0.5 (11)-1
KIT ORAL
COMMUNITY
End: 2022-10-16

## 2021-04-15 RX ORDER — VARENICLINE TARTRATE 0.5 MG/1
0.5 TABLET, FILM COATED ORAL
Status: COMPLETED | OUTPATIENT
Start: 2021-04-19 | End: 2021-04-22

## 2021-04-15 RX ORDER — SODIUM CHLORIDE 0.9 % (FLUSH) 0.9 %
5-10 SYRINGE (ML) INJECTION AS NEEDED
Status: DISCONTINUED | OUTPATIENT
Start: 2021-04-15 | End: 2021-04-29 | Stop reason: HOSPADM

## 2021-04-15 RX ORDER — OXYCODONE AND ACETAMINOPHEN 5; 325 MG/1; MG/1
1 TABLET ORAL
Status: DISCONTINUED | OUTPATIENT
Start: 2021-04-15 | End: 2021-04-21

## 2021-04-15 RX ORDER — METHADONE HYDROCHLORIDE 10 MG/ML
140 CONCENTRATE ORAL DAILY
Status: DISCONTINUED | OUTPATIENT
Start: 2021-04-16 | End: 2021-04-29 | Stop reason: HOSPADM

## 2021-04-15 RX ORDER — LANOLIN ALCOHOL/MO/W.PET/CERES
3 CREAM (GRAM) TOPICAL
Status: DISCONTINUED | OUTPATIENT
Start: 2021-04-15 | End: 2021-04-29 | Stop reason: HOSPADM

## 2021-04-15 RX ORDER — METHADONE HYDROCHLORIDE 10 MG/ML
140 CONCENTRATE ORAL DAILY
Status: DISCONTINUED | OUTPATIENT
Start: 2021-04-15 | End: 2021-04-15

## 2021-04-15 RX ORDER — METRONIDAZOLE 500 MG/100ML
500 INJECTION, SOLUTION INTRAVENOUS EVERY 8 HOURS
Status: DISCONTINUED | OUTPATIENT
Start: 2021-04-15 | End: 2021-04-16

## 2021-04-15 RX ORDER — LIDOCAINE HYDROCHLORIDE 20 MG/ML
SOLUTION OROPHARYNGEAL
Status: COMPLETED | OUTPATIENT
Start: 2021-04-15 | End: 2021-04-15

## 2021-04-15 RX ORDER — TAMSULOSIN HYDROCHLORIDE 0.4 MG/1
0.4 CAPSULE ORAL DAILY
Status: DISCONTINUED | OUTPATIENT
Start: 2021-04-15 | End: 2021-04-29 | Stop reason: HOSPADM

## 2021-04-15 RX ORDER — VARENICLINE TARTRATE 0.5 MG/1
0.5 TABLET, FILM COATED ORAL
Status: COMPLETED | OUTPATIENT
Start: 2021-04-16 | End: 2021-04-18

## 2021-04-15 RX ORDER — COLCHICINE 0.6 MG/1
0.6 TABLET ORAL
Status: ON HOLD | COMMUNITY

## 2021-04-15 RX ORDER — LINEZOLID 2 MG/ML
600 INJECTION, SOLUTION INTRAVENOUS EVERY 12 HOURS
Status: DISCONTINUED | OUTPATIENT
Start: 2021-04-15 | End: 2021-04-16

## 2021-04-15 RX ADMIN — LINEZOLID 600 MG: 600 INJECTION, SOLUTION INTRAVENOUS at 14:26

## 2021-04-15 RX ADMIN — GABAPENTIN 100 MG: 100 CAPSULE ORAL at 23:05

## 2021-04-15 RX ADMIN — GABAPENTIN 100 MG: 100 CAPSULE ORAL at 11:22

## 2021-04-15 RX ADMIN — OXYCODONE HYDROCHLORIDE AND ACETAMINOPHEN 1 TABLET: 5; 325 TABLET ORAL at 16:13

## 2021-04-15 RX ADMIN — SODIUM CHLORIDE 914 ML: 9 INJECTION, SOLUTION INTRAVENOUS at 07:32

## 2021-04-15 RX ADMIN — CEFEPIME HYDROCHLORIDE 2 G: 2 INJECTION, POWDER, FOR SOLUTION INTRAVENOUS at 11:22

## 2021-04-15 RX ADMIN — LINEZOLID 600 MG: 600 INJECTION, SOLUTION INTRAVENOUS at 21:36

## 2021-04-15 RX ADMIN — METRONIDAZOLE 500 MG: 500 INJECTION, SOLUTION INTRAVENOUS at 18:04

## 2021-04-15 RX ADMIN — METRONIDAZOLE 500 MG: 500 INJECTION, SOLUTION INTRAVENOUS at 12:53

## 2021-04-15 RX ADMIN — OXYCODONE HYDROCHLORIDE AND ACETAMINOPHEN 1 TABLET: 5; 325 TABLET ORAL at 11:22

## 2021-04-15 RX ADMIN — LIDOCAINE HYDROCHLORIDE 15 ML: 20 SOLUTION ORAL; TOPICAL at 07:31

## 2021-04-15 RX ADMIN — SODIUM CHLORIDE 1000 ML: 9 INJECTION, SOLUTION INTRAVENOUS at 07:07

## 2021-04-15 RX ADMIN — GABAPENTIN 100 MG: 100 CAPSULE ORAL at 16:14

## 2021-04-15 RX ADMIN — OXYCODONE HYDROCHLORIDE AND ACETAMINOPHEN 1 TABLET: 5; 325 TABLET ORAL at 19:58

## 2021-04-15 RX ADMIN — ALLOPURINOL 100 MG: 100 TABLET ORAL at 11:22

## 2021-04-15 RX ADMIN — TAMSULOSIN HYDROCHLORIDE 0.4 MG: 0.4 CAPSULE ORAL at 11:22

## 2021-04-15 RX ADMIN — CEFEPIME HYDROCHLORIDE 2 G: 2 INJECTION, POWDER, FOR SOLUTION INTRAVENOUS at 21:00

## 2021-04-15 RX ADMIN — KETOROLAC TROMETHAMINE 30 MG: 30 INJECTION, SOLUTION INTRAMUSCULAR at 07:32

## 2021-04-15 RX ADMIN — SODIUM CHLORIDE 125 ML/HR: 9 INJECTION, SOLUTION INTRAVENOUS at 11:22

## 2021-04-15 RX ADMIN — PIPERACILLIN AND TAZOBACTAM 3.38 G: 3; .375 INJECTION, POWDER, LYOPHILIZED, FOR SOLUTION INTRAVENOUS at 07:08

## 2021-04-15 RX ADMIN — LORAZEPAM 1 MG: 2 INJECTION INTRAMUSCULAR; INTRAVENOUS at 19:58

## 2021-04-15 RX ADMIN — FENTANYL CITRATE 50 MCG: 50 INJECTION, SOLUTION INTRAMUSCULAR; INTRAVENOUS at 07:31

## 2021-04-15 RX ADMIN — OXYCODONE HYDROCHLORIDE AND ACETAMINOPHEN 1 TABLET: 5; 325 TABLET ORAL at 23:05

## 2021-04-15 NOTE — H&P
Hospitalist Admission Note    NAME: Huang Arndt   :  1965   MRN:  [id number]     Date/Time:  4/15/2021 9:56 AM    Patient PCP: None  ______________________________________________________________________  Given the patient's current clinical presentation, I have a high level of concern for decompensation if discharged from the emergency department. Complex decision making was performed, which includes reviewing the patient's available past medical records, laboratory results, and x-ray films. My assessment of this patient's clinical condition and my plan of care is as follows. Assessment / Plan:  Sepsis POA: Temperature 101.4 at rate 108 WBC 25k  Infected extensive chronic  left lower extremity setting of pyoderma gangrenosum  We will admit to telemetry, patient completed recently 5 weeks of IV Zosyn PICC line for the infection of his left leg, was supposed to complete 6 weeks but had a rash which was attributed to the Zosyn. Patient was recently admitted in one of the [facility] .we will start IV cefepime, and Flagyl. Pt has reported allergy to vanco as SOB , will start linezolid in addition instead of vanco   Follow-up blood cultures, lactic acid. Get wound cultures  Will get sed rate and CRP also get MRI Of the left  Lower Extremity rule out osteomyelitis  wound care and surgery consulted for possible debridement  Patient was told in the past that he needs amputation of his left leg but had refused  BP soft, will hold home dose bp meds     History of traumatic subarachnoid hemorrhage in 2020  Avoid NSAIDs and blood thinners  Will place SCDs for DVT prophylaxis  History of gout and osteoarthritis of both knees   Per chart review, patient was supposed to get knee replacement      # Chronic methadone use: Continue home dose methadone once verified by pharmacy  # Hypothyroidism: Continue Synthroid  # HTN: Hold BP meds  # Left sided weakness w/ contracture.  Chronic        Code Status: Full code  Surrogate Decision Maker:  Sarah Lozada Other Relative [phone]     DVT Prophylaxis: SCDs  GI Prophylaxis: not indicated    Baseline: Ambulatory      Subjective:   CHIEF COMPLAINT: Fevers, nausea vomiting and infected wound    HISTORY OF PRESENT ILLNESS:     Rickie Finley is a 63 y.o.  male with extensive past medical history of chronic wound due to pyoderma gangrenosum, GERD, gout, recent traumatic subarachnoid hemorrhage, tonic methadone use, hypertension who presents with left leg pain. Patient has left chronic wound for the last 2 years, has a history of infected leg wound, status post IV Zosyn for 5 weeks. patient reported that he was hospitalized in March 2021 at the [facility] for the infected leg wound, was to complete 6 weeks of IV Zosyn but ended up completing only 5 weeks as he was having rash which was attributed to an allergic reaction to the Zosyn. In addition to the leg pain, he complains of nausea vomiting and fevers. Patient reported purulent drainage and foul-smelling wound on the left leg since he has stopped the antibiotics. Antibiotics reportedly stopped 2 weeks ago. He reported that the wound will was getting better on IV antibiotics, was told that he needs amputation but refused. InThe ED, was found to be febrile with a temperature one 101.4, tachycardic and mildly hypotensive  His labs revealed white blood cell count 25k, his BMP revealed low sodium  X-ray of his left ankle did not show any bone infection  We were asked to admit for work up and evaluation of the above problems.      Past Medical History:   Diagnosis Date    Aneurysm [facility])     (with stroke)    Bladder tumor     Cancer ([facility] [address]     bladder CA    Chronic pain     Family history of bladder cancer     GERD (gastroesophageal reflux disease)     Gout     History of vascular access device 04/25/2019    [facility] VAT 4 FR MIDLINE R Cephalic Limited access    History of vascular access device 04/26/2019    4 fr Midline right Cephalic midline access    Hypercholesterolemia     Hypertension     Lesion of bladder     [facility]izures ([facility] [address]     Stroke [facility]) 2006    left sided weakness    Thromboembolus ([facility] [address]     left leg    Thyroid disease     TIA (transient ischemic attack) 2012        Past Surgical History:   Procedure Laterality Date    HX ORTHOPAEDIC      R arthroscopy    HX OTHER SURGICAL      x2 L foot    HX UROLOGICAL  04/20/2018    Cystoscopy, TURBT (greater than 5 cm resected) Dr. Jolynn Newberry, [facility].  KNEE ARTHROSCP HARV      left knee    TRANSURETHRAL RESEC BLADDER NECK  08/10/2018       Social History     Tobacco Use    Smoking status: Current Every Day Smoker     Packs/day: 0.50    Smokeless tobacco: Never Used    Tobacco comment: instructed not to smoke 24 hrs prior surgery   Substance Use Topics    Alcohol use: Yes     Alcohol/week: 6.0 standard drinks     Types: 6 Cans of beer per week     Comment: occasional        Family History   Problem Relation Age of Onset    Diabetes Father     Lung Disease Father     Diabetes Sister     Diabetes Brother     Cancer Paternal Grandfather         Bladder     Allergies   Allergen Reactions    Sulfamethoxazole-Trimethoprim Rash     L eye and L hand    Ciprofloxacin Hives     Per pcp records    Codeine Hives     Tolerates dilaudid, oxycodone    Cymbalta [Duloxetine] Other (comments)     Confusion and memory loss    Lyrica [Pregabalin] Hives    Sulfa (Sulfonamide Antibiotics) Rash    Ultram [Tramadol] Hives    Vancomycin Shortness of Breath        Prior to Admission medications    Medication Sig Start Date End Date Taking? Authorizing Provider   tamsulosin [facility]) 0.4 mg capsule Take 1 capsule by mouth daily after dinner  3/2/21   Patrick Vann MD   lidocaine (XYLOCAINE) 4 % topical cream Apply  to affected area as needed for Pain (apply to left lower leg wounds during dressing changes).  12/22/20 Sebastian More MD   gabapentin (NEURONTIN) 100 mg capsule Take 100 mg by mouth three (3) times daily. Provider, Historical   levothyroxine (SYNTHROID) 112 mcg tablet Take 112 mcg by mouth Daily (before breakfast). Provider, Historical   lisinopriL (PRINIVIL, ZESTRIL) 20 mg tablet Take 20 mg by mouth daily. Provider, Historical   naproxen sodium (Aleve) 220 mg tablet Take 220 mg by mouth three (3) times daily as needed. Provider, Historical   aspirin 81 mg chewable tablet Take 81 mg by mouth daily. Provider, Historical   pantoprazole (PROTONIX) 40 mg tablet Take 40 mg by mouth daily as needed. 7/17/19   Provider, Historical   CHANTIX STARTING MONTH BOX 0.5 mg (11)- 1 mg (42) DsPk TAKE 1 TABLET BY MOUTH IN MORNING WITH FOOD FOR 3 DAYS THEN INCREASE TO 1 TABLET BY MOUTH TWICE DAILY WITH FOOD THEREAFTER AS DIRECTED ON PA 6/21/19   Provider, Historical   allopurinol (ZYLOPRIM) 100 mg tablet Take 1 Tab by mouth daily. Indications: treatment to prevent acute gout attack 5/24/19   Cherelle EDOUARD NP   senna-docusate (DARELL-COLACE) 8.6-50 mg per tablet Take 1 Tab by mouth daily. Provider, Historical   methadone (DOLOPHINE) 10 mg/mL solution Take 140 mg by mouth daily. Indications: excessive pain    Provider, Historical   melatonin 3 mg tablet Take 1 Tab by mouth nightly as needed. 10/12/18   Anne Waldron MD       REVIEW OF SYSTEMS:     I am not able to complete the review of systems because:    The patient is intubated and sedated    The patient has altered mental status due to his acute medical problems    The patient has baseline aphasia from prior stroke(s)    The patient has baseline dementia and is not reliable historian    The patient is in acute medical distress and unable to provide information           Total of 12 systems reviewed as follows:       POSITIVE= underlined text  Negative = text not underlined  General:  fever, chills, sweats, generalized weakness, weight loss/gain,      loss of appetite   Eyes:    blurred vision, eye pain, loss of vision, double vision  ENT:    rhinorrhea, pharyngitis   Respiratory:   cough, sputum production, SOB, BECK, wheezing, pleuritic pain   Cardiology:   chest pain, palpitations, orthopnea, PND, edema, syncope   Gastrointestinal:  abdominal pain , N/V, diarrhea, dysphagia, constipation, bleeding   Genitourinary:  frequency, urgency, dysuria, hematuria, incontinence   Muskuloskeletal :  arthralgia, myalgia, back pain, left leg pain  Hematology:  easy bruising, nose or gum bleeding, lymphadenopathy   Dermatological: Chronic left wound with purulent drainage and foul-smelling  Endocrine:   hot flashes or polydipsia   Neurological:  headache, dizziness, confusion, focal weakness, paresthesia,     Speech difficulties, memory loss, gait difficulty  Psychological: Feelings of anxiety, depression, agitation    Objective:   VITALS:    Visit Vitals  /67   Pulse 96   Temp 98.7 °F (37.1 °C)   Resp 16   Ht 5' 6\" (1.676 m)   Wt 108 kg (238 lb 1.6 oz)   SpO2 96%   BMI 38.43 kg/m²       PHYSICAL EXAM:    General:    Alert, cooperative, no distress, appears stated age. HEENT: Atraumatic, anicteric sclerae, pink conjunctivae     No oral ulcers, mucosa moist, throat clear, dentition fair  Neck:  Supple, symmetrical,  thyroid: non tender  Lungs:   Clear to auscultation bilaterally. No Wheezing or Rhonchi. No rales. Chest wall:  No tenderness  No Accessory muscle use. Heart:   Regular  rhythm,  No  murmur   No edema  Abdomen:   Soft, non-tender. Not distended. Bowel sounds normal  Extremities: No cyanosis. No clubbing,      Skin turgor normal, Capillary refill normal, Radial dial pulse 2+  Skin:      extensive Infected leg leg wound and left foot   Psych:  Fair  insight. Not depressed. Not anxious or agitated. Neurologic: EOMs intact. No facial asymmetry. No aphasia or slurred speech. Symmetrical strength, Sensation grossly intact. Alert and oriented X 4. _______________________________________________________________________  Care Plan discussed with:    Comments   Patient x    Family      RN x    Care Manager                    Consultant:      _______________________________________________________________________  Expected  Disposition:   Home with Family    HH/PT/OT/RN    SNF/LTC    LORAINE    ________________________________________________________________________  [facility] Provided     Minutes non procedure based      Comments    x Reviewed previous records   >50% of visit spent in counseling and coordination of care x Discussion with patient and/or family and questions answered       ________________________________________________________________________  Signed: Erica Mayo MD    Procedures: see electronic medical records for all procedures/Xrays and details which were not copied into this note but were reviewed prior to creation of Plan. LAB DATA REVIEWED:    Recent Results (from the past 24 hour(s))   METABOLIC PANEL, COMPREHENSIVE    Collection Time: 04/15/21  7:04 AM   Result Value Ref Range    Sodium 133 (L) 136 - 145 mmol/L    Potassium 4.6 3.5 - 5.1 mmol/L    Chloride 97 97 - 108 mmol/L    CO2 33 (H) 21 - 32 mmol/L    Anion gap 3 (L) 5 - 15 mmol/L    Glucose 142 (H) 65 - 100 mg/dL    BUN 5 (L) 6 - 20 MG/DL    Creatinine 0.75 0.70 - 1.30 MG/DL    BUN/Creatinine ratio 7 (L) 12 - 20      GFR est AA >60 >60 ml/min/1.73m2    GFR est non-AA >60 >60 ml/min/1.73m2    Calcium 8.7 8.5 - 10.1 MG/DL    Bilirubin, total 0.6 0.2 - 1.0 MG/DL    ALT (SGPT) 42 12 - 78 U/L    AST (SGOT) 16 15 - 37 U/L    Alk.  phosphatase 120 (H) 45 - 117 U/L    Protein, total 6.3 (L) 6.4 - 8.2 g/dL    Albumin 2.8 (L) 3.5 - 5.0 g/dL    Globulin 3.5 2.0 - 4.0 g/dL    A-G Ratio 0.8 (L) 1.1 - 2.2     CBC WITH AUTOMATED DIFF    Collection Time: 04/15/21  7:04 AM   Result Value Ref Range    WBC 25.2 (H) 4.1 - 11.1 K/uL    RBC 4.18 4.10 - 5.70 M/uL    HGB 12.0 (L) 12.1 - 17.0 g/dL    HCT 37.1 36.6 - 50.3 %    MCV 88.8 80.0 - 99.0 FL    MCH 28.7 26.0 - 34.0 PG    MCHC 32.3 30.0 - 36.5 g/dL    RDW 18.7 (H) 11.5 - 14.5 %    PLATELET 022 543 - 703 K/uL    MPV 10.1 8.9 - 12.9 FL    NRBC 0.0 0  WBC    ABSOLUTE NRBC 0.00 0.00 - 0.01 K/uL    NEUTROPHILS 85 (H) 32 - 75 %    BAND NEUTROPHILS 3 %    LYMPHOCYTES 5 (L) 12 - 49 %    MONOCYTES 6 5 - 13 %    EOSINOPHILS 0 0 - 7 %    BASOPHILS 1 0 - 1 %    IMMATURE GRANULOCYTES 0 0.0 - 0.5 %    ABS. NEUTROPHILS 22.1 (H) 1.8 - 8.0 K/UL    ABS. LYMPHOCYTES 1.3 0.8 - 3.5 K/UL    ABS. MONOCYTES 1.5 (H) 0.0 - 1.0 K/UL    ABS. EOSINOPHILS 0.0 0.0 - 0.4 K/UL    ABS. BASOPHILS 0.3 (H) 0.0 - 0.1 K/UL    ABS. IMM. GRANS. 0.0 0.00 - 0.04 K/UL    DF MANUAL      RBC COMMENTS ANISOCYTOSIS  1+       SAMPLES BEING HELD    Collection Time: 04/15/21  7:04 AM   Result Value Ref Range    SAMPLES BEING HELD  BLUE, SST, RED     COMMENT        Add-on orders for these samples will be processed based on acceptable specimen integrity and analyte stability, which may vary by analyte.    POC LACTIC ACID    Collection Time: 04/15/21  7:09 AM   Result Value Ref Range    Lactic Acid (POC) 1.11 0.40 - 2.00 mmol/L   EKG, 12 LEAD, INITIAL    Collection Time: 04/15/21  7:35 AM   Result Value Ref Range    Ventricular Rate 95 BPM    Atrial Rate 95 BPM    P-R Interval 148 ms    QRS Duration 86 ms    Q-T Interval 380 ms    QTC Calculation (Bezet) 477 ms    Calculated P Axis 45 degrees    Calculated R Axis 23 degrees    Calculated T Axis 50 degrees    Diagnosis       Normal sinus rhythm  Possible Left atrial enlargement    Confirmed by Rafael Vásquez M.D. ([id number]) on 4/15/2021 8:45:51 AM

## 2021-04-15 NOTE — PROGRESS NOTES
End of Shift Note    Bedside shift change report given to Maria T Garrett RN(oncoming nurse) by Emani Kim (offgoing nurse). Report included the following information SBAR, Kardex, Intake/Output, MAR and Recent Results    Shift worked:  [id number]     Shift summary and any significant changes:     Pt c/o discomfort to left lower leg. Leg is cellulitic with eschar and absent pulse. Concerns for physician to address:       Cecy Cheema phone for oncoming shift:          Activity:  Activity Level: Dangle Side of Bed  Number times ambulated in hallways past shift: 0  Number of times OOB to chair past shift: 0    Cardiac:   Cardiac Monitoring: Yes           Access:   Current line(s): PIV     Genitourinary:   Urinary status: voiding    Respiratory:   O2 Device: None (Room air)  Chronic home O2 use?: NO  Incentive spirometer at bedside: NO     GI:  Last Bowel Movement Date: 04/14/21  Current diet:  DIET REGULAR    Tolerating current diet: YES       Pain Management:   Patient states pain is manageable on current regimen: YES    Skin:  Dejuan Score: 13  Interventions: float heels, increase time out of bed, foam dressing and nutritional support     Patient Safety:  Fall Score:  Total Score: 3  Interventions: gripper socks, pt to call before getting OOB and stay with me (per policy)  High Fall Risk: Yes    Length of Stay:  Expected LOS: - - -  Actual LOS: 0      Emani Kim

## 2021-04-15 NOTE — PROGRESS NOTES
Transition of Care Plan:    RUR: 21% - moderate  Disposition: TBD - SNF vs HH  Follow up appointments: New PCP appt, Ortho? DME needed: TBD  Transportation at Discharge: Pt's friend  Alfredo Monteiro or means to access home:  Pt's friend has access      Caregiver Contact: Jose Roque ([phone])  Discharge Caregiver contacted prior to discharge? CM will contact pt's cg 24 hours prior to d/c    Reason for Admission:   Sepsis, Infected wound                    RUR Score:     21%             PCP: First and Last name:   CM will establish new PCP appt   Name of Practice:    Are you a current patient: Yes/No:    Approximate date of last visit:    Can you participate in a virtual visit if needed:     Do you (patient/family) have any concerns for transition/discharge? None at this time               Plan for utilizing home health:   TBD pending eval    Current Advanced Directive/Advance Care Plan:  Prior      Healthcare Decision Maker:   Francoisrio Jude ([phone])    Transition of Care Plan:  TBD - HH vs SNF        CM contacted patient at the bedside to complete initial assessment due contact isolation precautions. CM introduced role, verified demographics, and discussed discharge planning. Pt is a 64 yo male with an admitting diagnosis of Sepsis and infected wound. Pt has [employer] for his insurance. Pt lives in a handicap hotel ([facility]) with his friend. Pt is wheelchair bound and requires assistance for his ADL/IADLs. Pt's friend drives him to his appointments. Pt has a wheelchair and cane. Pt has used [facility] in the past. Pt has no hx of SNF or IPR. Pt's friend will transport him home once he is d/c from the hospital.    CM will continue to follow for discharge planning while patient is admitted on current unit. Please contact this CM with questions or issues related to discharge.      Care Management Interventions  PCP Verified by CM: No(Pt requesting CM arrange new PCP appt)  Mode of Transport at Discharge: Other (see comment)(Pt's friend)  Transition of Care Consult (CM Consult): Discharge Planning(TBD - HH vs SNF)  Discharge Durable Medical Equipment: No  Physical Therapy Consult: No  Occupational Therapy Consult: No  Speech Therapy Consult: No  Current Support Network: Other(Pt lives in a [address] room with a friend)  Confirm Follow Up Transport: Friends  Discharge Location  Discharge Placement: Other:(TBD - HH vs SNF)  ----------------------------------------------------------  Advance Care Planning     General Advance Care Planning (ACP) Conversation      Date of Conversation: 4/15/2021  Conducted with: Patient with Decision Making Capacity    Healthcare Decision Maker:     Primary Decision Maker: Maria Victoria Zapata - Other Relative - [phone]  Today we documented Decision Maker(s) consistent with ACP documents on file. Content/Action Overview:    Has ACP document(s) on file - reflects the patient's care preferences  Reviewed DNR/DNI and patient elects Full Code (Attempt Resuscitation)    Length of Voluntary ACP Conversation in minutes:  <16 minutes (Non-Billable)    JUVENCIO Dixon  Care Manager [id number] [facility]  [phone]

## 2021-04-15 NOTE — ED PROVIDER NOTES
EMERGENCY DEPARTMENT HISTORY AND PHYSICAL EXAM      Date: 4/15/2021  Patient Name: Rogers Anderson    History of Presenting Illness     Chief Complaint   Patient presents with    Leg Pain     patient came in by squad with c/o of left leg pain due to a wound that wont heal per patient. patient says hes had the wound for over 2 years. currently wrapped with radha and abd pads. patient was on long term abx therapy zosyn via PICC line. just discontinued       History Provided By: Patient and Caregiver    HPI: Rogers Anderson, 63 y.o. male with PMHx significant for prior CVA, seizures, prior DVT, GERD, bladder cancer, chronic nonhealing wound on his left lower extremity who presents with concerns for worsening infection of the left lower extremity wound. Patient reports that the wound has been present for two and half years and as a result of pyoderma gangrenosum. He had previously been on antibiotics via PICC line for reported Pseudomonas and E. coli infection. He has been off antibiotic since the middle of March. Caregiver reports that over the last 24 hours the leg has gotten worse with spreading redness above the area of the chronic wound. Patient reports chronic pain in the area that is not significantly changed. No fevers at home. No chest pain, shortness of breath, abdominal pain, nausea, vomiting. PCP: None    There are no other complaints, changes, or physical findings at this time.     Current Facility-Administered Medications   Medication Dose Route Frequency Provider Last Rate Last Admin    sodium chloride (NS) flush 5-10 mL  5-10 mL IntraVENous PRN Ninoska Lockhart MD        cefepime (MAXIPIME) 2 g in 0.9% sodium chloride (MBP/ADV) 100 mL MBP  2 g IntraVENous Q12H Ninoska Lockhart  mL/hr at 04/15/21 1122 2 g at 04/15/21 1122    metroNIDAZOLE (FLAGYL) IVPB premix 500 mg  500 mg IntraVENous Q8H Ninoska Lockhart  mL/hr at 04/15/21 1253 500 mg at 04/15/21 1253    allopurinoL (ZYLOPRIM) tablet 100 mg  100 mg Oral DAILY Keira Berrios MD   100 mg at 04/15/21 1122    gabapentin (NEURONTIN) capsule 100 mg  100 mg Oral TID Keira Berrios MD   100 mg at 04/15/21 1122    [START ON 4/16/2021] levothyroxine (SYNTHROID) tablet 112 mcg  112 mcg Oral ACB Keira Berrios MD        melatonin tablet 3 mg  3 mg Oral QHS PRN Keira Berrios MD        [facility] capsule 0.4 mg  0.4 mg Oral DAILY Keira Berrios MD   0.4 mg at 04/15/21 1122    [START ON 4/16/2021] methadone (DOLOPHINE) 10 mg/mL concentrated solution 140 mg  140 mg Oral DAILY Keira Berrios MD        0.9% sodium chloride infusion  125 mL/hr IntraVENous CONTINUOUS Keira Berrios  mL/hr at 04/15/21 1122 125 mL/hr at 04/15/21 1122    linezolid in dextrose 5% (ZYVOX) IVPB premix in D5W 600 mg  600 mg IntraVENous Q12H Keira Berrios MD   600 mg at 04/15/21 1426    [START ON 4/16/2021] varenicline (CHANTIX) 0.5 mg tablet 0.5 mg  0.5 mg Oral DAILY AFTER Haley Lazo MD        Followed by   Srinivasa Ugarte ON 4/19/2021] varenicline (CHANTIX) 0.5 mg tablet 0.5 mg  0.5 mg Oral BIDPC Keira Berrios MD        Followed by   Srinivasa Ugarte ON 4/23/2021] varenicline (CHANTIX) 0.5 mg tablet 1 mg  1 mg Oral BIDPC Keira Berrios MD        LORazepam (ATIVAN) injection 1 mg  1 mg IntraVENous ONCE Keira Berrios MD        oxyCODONE-acetaminophen (PERCOCET) 5-325 mg per tablet 1 Tab  1 Tab Oral Q4H PRN Keira Berrios MD         Past History     Past Medical History:  Past Medical History:   Diagnosis Date    [facility]neur[facility]m [facility])     (with stroke)    Bladder tumor     Cancer ([facility] [address]     bladder CA    Chronic pain     Family history of bladder cancer     GERD (gastroesophageal reflux disease)     Gout     History of vascular access device 04/25/2019    [facility] VAT 4 FR MIDLINE R Cephalic Limited access    History of vascular access device 04/26/2019    4 fr Midline right Cephalic midline access    Hypercholesterolemia     Hypertension  Lesion of bladder     Seizures ([facility] [address]     Stroke [facility]) 2006    left sided weakness    Thromboembolus ([facility] [address]     left leg    Thyroid disease     TIA (transient ischemic attack) 2012     Past Surgical History:  Past Surgical History:   Procedure Laterality Date    HX ORTHOPAEDIC      R arthroscopy    HX OTHER SURGICAL      x2 L foot    HX UROLOGICAL  04/20/2018    Cystoscopy, TURBT (greater than 5 cm resected) Dr. Jolynn Newberry, [facility].  KNEE ARTHROSCP HARV      left knee    TRANSURETHRAL RESEC BLADDER NECK  08/10/2018     Family History:  Family History   Problem Relation Age of Onset    Diabetes Father     Lung Disease Father     Diabetes Sister     Diabetes Brother     Cancer Paternal Grandfather         Bladder     Social History:  Social History     Tobacco Use    Smoking status: Current Every Day Smoker     Packs/day: 0.50    Smokeless tobacco: Never Used    Tobacco comment: instructed not to smoke 24 hrs prior surgery   Substance Use Topics    Alcohol use: Yes     Alcohol/week: 6.0 standard drinks     Types: 6 Cans of beer per week     Comment: occasional    Drug use: Yes     Types: Prescription     Allergies: Allergies   Allergen Reactions    Sulfamethoxazole-Trimethoprim Rash     L eye and L hand    Ciprofloxacin Hives     Per pcp records    Codeine Hives     Tolerates dilaudid, oxycodone    Cymbalta [Duloxetine] Other (comments)     Confusion and memory loss    Lyrica [Pregabalin] Hives    Sulfa (Sulfonamide Antibiotics) Rash    Ultram [Tramadol] Hives    Vancomycin Shortness of Breath    Zosyn [Piperacillin-Tazobactam] Rash     Review of Systems   Review of Systems   Constitutional: Negative for chills and fever. HENT: Negative for congestion, rhinorrhea and sore throat. Respiratory: Negative for cough and shortness of breath. Cardiovascular: Negative for chest pain. Gastrointestinal: Negative for abdominal pain, nausea and vomiting.    Genitourinary: Negative for dysuria and urgency. Skin: Positive for color change and wound. Negative for rash. Neurological: Negative for dizziness, light-headedness and headaches. All other systems reviewed and are negative. Physical Exam   Physical Exam  Vitals signs and nursing note reviewed. Constitutional:       General: He is not in acute distress. Appearance: He is well-developed. HENT:      Head: Normocephalic and atraumatic. Eyes:      Conjunctiva/sclera: Conjunctivae normal.      Pupils: Pupils are equal, round, and reactive to light. Neck:      Musculoskeletal: Normal range of motion. Cardiovascular:      Rate and Rhythm: Regular rhythm. Tachycardia present. Pulmonary:      Effort: Pulmonary effort is normal. No respiratory distress. Breath sounds: Normal breath sounds. No stridor. Abdominal:      General: There is no distension. Palpations: Abdomen is soft. Tenderness: There is no abdominal tenderness. Musculoskeletal: Normal range of motion. Skin:     General: Skin is warm and dry. Findings: Wound present. Comments: Extensive wound to the left ankle and top of the left foot (see pictures)   Neurological:      Mental Status: He is alert and oriented to person, place, and time. Diagnostic Study Results   Labs -     Recent Results (from the past 12 hour(s))   METABOLIC PANEL, COMPREHENSIVE    Collection Time: 04/15/21  7:04 AM   Result Value Ref Range    Sodium 133 (L) 136 - 145 mmol/L    Potassium 4.6 3.5 - 5.1 mmol/L    Chloride 97 97 - 108 mmol/L    CO2 33 (H) 21 - 32 mmol/L    Anion gap 3 (L) 5 - 15 mmol/L    Glucose 142 (H) 65 - 100 mg/dL    BUN 5 (L) 6 - 20 MG/DL    Creatinine 0.75 0.70 - 1.30 MG/DL    BUN/Creatinine ratio 7 (L) 12 - 20      GFR est AA >60 >60 ml/min/1.73m2    GFR est non-AA >60 >60 ml/min/1.73m2    Calcium 8.7 8.5 - 10.1 MG/DL    Bilirubin, total 0.6 0.2 - 1.0 MG/DL    ALT (SGPT) 42 12 - 78 U/L    AST (SGOT) 16 15 - 37 U/L    Alk.  phosphatase 120 (H) 45 - 117 U/L    Protein, total 6.3 (L) 6.4 - 8.2 g/dL    Albumin 2.8 (L) 3.5 - 5.0 g/dL    Globulin 3.5 2.0 - 4.0 g/dL    A-G Ratio 0.8 (L) 1.1 - 2.2     CBC WITH AUTOMATED DIFF    Collection Time: 04/15/21  7:04 AM   Result Value Ref Range    WBC 25.2 (H) 4.1 - 11.1 K/uL    RBC 4.18 4.10 - 5.70 M/uL    HGB 12.0 (L) 12.1 - 17.0 g/dL    HCT 37.1 36.6 - 50.3 %    MCV 88.8 80.0 - 99.0 FL    MCH 28.7 26.0 - 34.0 PG    MCHC 32.3 30.0 - 36.5 g/dL    RDW 18.7 (H) 11.5 - 14.5 %    PLATELET 302 396 - 168 K/uL    MPV 10.1 8.9 - 12.9 FL    NRBC 0.0 0  WBC    ABSOLUTE NRBC 0.00 0.00 - 0.01 K/uL    NEUTROPHILS 85 (H) 32 - 75 %    BAND NEUTROPHILS 3 %    LYMPHOCYTES 5 (L) 12 - 49 %    MONOCYTES 6 5 - 13 %    EOSINOPHILS 0 0 - 7 %    BASOPHILS 1 0 - 1 %    IMMATURE GRANULOCYTES 0 0.0 - 0.5 %    ABS. NEUTROPHILS 22.1 (H) 1.8 - 8.0 K/UL    ABS. LYMPHOCYTES 1.3 0.8 - 3.5 K/UL    ABS. MONOCYTES 1.5 (H) 0.0 - 1.0 K/UL    ABS. EOSINOPHILS 0.0 0.0 - 0.4 K/UL    ABS. BASOPHILS 0.3 (H) 0.0 - 0.1 K/UL    ABS. IMM. GRANS. 0.0 0.00 - 0.04 K/UL    DF MANUAL      RBC COMMENTS ANISOCYTOSIS  1+       SAMPLES BEING HELD    Collection Time: 04/15/21  7:04 AM   Result Value Ref Range    SAMPLES BEING HELD  BLUE, SST, RED     COMMENT        Add-on orders for these samples will be processed based on acceptable specimen integrity and analyte stability, which may vary by analyte.    POC LACTIC ACID    Collection Time: 04/15/21  7:09 AM   Result Value Ref Range    Lactic Acid (POC) 1.11 0.40 - 2.00 mmol/L   EKG, 12 LEAD, INITIAL    Collection Time: 04/15/21  7:35 AM   Result Value Ref Range    Ventricular Rate 95 BPM    Atrial Rate 95 BPM    P-R Interval 148 ms    QRS Duration 86 ms    Q-T Interval 380 ms    QTC Calculation (Bezet) 477 ms    Calculated P Axis 45 degrees    Calculated R Axis 23 degrees    Calculated T Axis 50 degrees    Diagnosis       Normal sinus rhythm  Possible Left atrial enlargement    Confirmed by Krista Pedraza M.D. ([id number]) on 4/15/2021 8:45:51 AM     COVID-19 RAPID TEST    Collection Time: 04/15/21  9:41 AM   Result Value Ref Range    Specimen source Nasopharyngeal      COVID-19 rapid test Not detected NOTD     SED RATE (ESR)    Collection Time: 04/15/21 12:57 PM   Result Value Ref Range    Sed rate, automated 58 (H) 0 - 20 mm/hr       Radiologic Studies -   XR ANKLE LT AP/LAT   Final Result    impression: No acute bone findings suspected. XR CHEST PORT   Final Result   No acute infiltrate. MRI LOW EXT LT W WO CONT    (Results Pending)     Xr Ankle Lt Ap/lat    Result Date: 4/15/2021   impression: No acute bone findings suspected. Xr Chest Port    Result Date: 4/15/2021  No acute infiltrate. Medical Decision Making   I am the first provider for this patient. I reviewed the vital signs, available nursing notes, past medical history, past surgical history, family history and social history. Vital Signs-Reviewed the patient's vital signs. Patient Vitals for the past 12 hrs:   Temp Pulse Resp BP SpO2   04/15/21 1022 98.7 °F (37.1 °C) 86 18 112/64 96 %   04/15/21 0930 98.7 °F (37.1 °C) 96 16 114/67 96 %   04/15/21 0800 -- 93 11 98/69 98 %   04/15/21 0641 (!) 101.4 °F (38.6 °C) (!) 108 20 97/69 92 %       Pulse Oximetry Analysis - 92% on ra    Cardiac Monitor:   Rate: 108 bpm  Rhythm: Sinus Tachycardia      ED EKG interpretation:  Rhythm: normal sinus rhythm; and regular . Rate (approx.): 95; Axis: normal; P wave: normal; QRS interval: normal ; ST/T wave: normal; Other findings: normal. This EKG was interpreted by ELIZABETH Fonseca MD,ED Provider. Records Reviewed: Nursing Notes and Old Medical Records    Provider Notes (Medical Decision Making):   Patient presents with a chief complaints of worsening erythema to the left lower extremity the setting of a chronic wound with a history of superimposed infections. On arrival he is febrile and mildly tachycardic but nontoxic-appearing.   Wound itself is malodorous, extensive, with signs of surrounding cellulitis. Will check basic lab work, lactate, blood cultures. Also x-ray the ankle to evaluate for possible osteomyelitis. Will start broad-spectrum antibiotics. Anticipate admission. ED Course:   Initial assessment performed. The patients presenting problems have been discussed, and they are in agreement with the care plan formulated and outlined with them. I have encouraged them to ask questions as they arise throughout their visit. Procedures:  Procedures    Critical Care:  none    Disposition:    Admission Note:  Patient is being admitted to the hospital by Dr. Lyndel Nyhan, Service: Hospitalist.  The results of their tests and reasons for their admission have been discussed with them and available family. They convey agreement and understanding for the need to be admitted and for their admission diagnosis. Diagnosis     Clinical Impression:   1. Sepsis, due to unspecified organism, unspecified whether acute organ dysfunction present ([address]    2. Cellulitis of left lower extremity    3. Wound of left foot            Please note that this dictation was completed with [employer], the computer voice recognition software. Quite often unanticipated grammatical, syntax, homophones, and other interpretive errors are inadvertently transcribed by the computer software. Please disregard these errors.   Please excuse any errors that have escaped final proofreading

## 2021-04-15 NOTE — CONSULTS
Ortho:    Pt with 2 years of non healing LLE wounds,  Pyoderma gangrenosum, Multiple episodes of infection, smoker  Pt has significant hx of no-show and appt rescheduling with his dermatologist     Most recent work up/treatment recommendation---See below ----      [place] ID--- March 2021  \" Infected LLE wounds with possible osteomyelitis of the lateral malleolus on CT scan on 2/12/21  -Wound swab culture on 2/5/21 done as outpatient grew mixed la including Pseudomonas aeruginosa and E. coli S to Zosyn (suspect some component of colonization)  -X-ray of the left foot showed extensive soft tissue wound at the distal calf, hindfoot and forefoot and no radiographic evidence of osteomyelitis  -Unable to tolerate MRI after 2 attempts despite Lorazepam  -CT scan of the left lower extremity with contrast 2/12/21 showed cortical irregularity involving the tip of the lateral malleolus, osteomyelitis not excluded, diffuse subcutaneous soft tissue stranding, soft tissue defects over the posterior distal calf and dorsum of the foot and no discrete fluid collection to suggest an abscess  -Blood cultures were negative  -Declined BKA in the past ([facility] in Oct 2020)   -Seen by Dr. Solis Smoker on 2/11/21 and again declined BKA  -Also seen by Vascular Surgery on 2/12/21 and also declined left BKA  -Xray of the left foot 3/1/21 No definitive evidence for acute osseous abnormality, diffuse soft tissue swelling and irregularity  -Arterial doppler BLE 3/2/21 No evidence of arterial insufficiency in the right lower extremity at rest  -Blood cultures on 3/1 were negative     New fever noted on 3/5/21 could be due to drug fever vs gout vs other non-infectious etiologies  -Urinalysis was unremarkable for infection  -Urine culture was negative  -CXR no acute process  -Lactate normal  -Procalcitonin 3/8/21 was 0.1  -Repeat blood cultures on 3/5 and 3/7 were negative   -No diarrhea reported     Recent hospitalization from 2/9/21 to 2/26/21 for above  -Discharged on Zosyn     Chronic non healing LLE wounds x 2 1/2 years s/p multiple debridements in the past  -Arteriogram left lower extremity 10/2/2020 showed no evidence of hemodynamically significant inflow, outflow or runoff disease and noted approximately 50% stenosis in the left popliteal artery ([facility])     Extensive pyoderma gangrenosum diagnosed in Nov 2020  -Being treated with intralesional steroid injections per Dermatology (Dr. Pena Auberry)     Leukocytosis due to Prednisone and stress leukocytosis     Elevated ESR and CRP     Negative COVID-19 PCR on 2/11/21     Acute nonocclusive superficial venous thrombosis in the right basilic vein associated with a PICC line and chronic nonocclusive superficial venous thrombosis in the right cephalic vein  -PVL venous exam of the right upper extremity on 3/1/2021     Abdominal pain on admission  -CT abdomen and pelvis with IV contrast 3/1/21 No acute intra-abdominal or pelvic process  -X-ray of the abdomen 3/5/21 No free intraperitoneal air nor bowel obstruction     Diarrhea last admission, stool for C. difficile assay on 2/22 was negative, this has resolved  -Stool WBC, Campylobacter assay, Shiga toxin EIA and culture 2/22 were negative     Hypothyroidism on Synthroid   -Elevated TSH 12.62 on 2/12/21  -Normal Free T4 on 2/13/21     COPD     Gout with right wrist pain on Colchicine and Allopurinol  -Clinically improved on Prednisone  -Completed tapering dose of Prednisone on 3/13/21     Chronic pain syndrome on Methadone  -Patient reportedly ran out of Methadone as refill request was [facility] the [facility]     History of hemorrhagic CVA with residual LUE weakness    Seizure disorder     History of bladder cancer  -Being followed by Urology     History of DVT     Iron deficiency anemia     History of medical non-compliance per old records     History of tobacco abuse  -He states that he quit        Recommendations:      Continue Zosyn     Continue weekly CBC, BUN, creatinine, ESR and CRP     If patient continues to clinically improve and remains stable and blood cultures are negative, anticipate 6 weeks of IV antibiotic therapy, projected end date is 3/23/21, may need longer depending on response     Given the extent and severity of his PG and wound infection, doubt antibiotic therapy alone will cure his infection, patient has declined left BKA multiple times in the past     Possible outcomes discussed including persistence or worsening of infection including sepsis and death despite long-term antibiotic therapy     Maintain adequate hydration  Increase fiber in the diet  PICC care per protocol  Continue local wound care  Pain control     Keep follow-up appt with Dermatology tomorrow for continuation of management of PG, will discuss with dermatologist     Patient was advised to go to ER if fever recurs or if he develops any worsening LLE symptoms\"           No orthopedic surgical intervention at this time    Recommend vascular surgery consult for LE amputation.      Plan per Dr Palma Ferris PA-C

## 2021-04-15 NOTE — PROGRESS NOTES
Please call MRI once patient is COVID negative; if MRI exam is needed sooner please have a peer to peer review with the radiologist. If you have any questions please call MRI @ [phone]. TY!

## 2021-04-15 NOTE — PROGRESS NOTES
End of Shift Note    Bedside shift change report given to So Morgan RN (oncoming nurse) by March Mark (offgoing nurse). Report included the following information SBAR, Kardex, Intake/Output, MAR and Recent Results    Shift worked:  [id number]     Shift summary and any significant changes:     Pt unable to ambulate. C/o severe left lower leg pain. Concerns for physician to address:       Zone phone for oncoming shift:            Activity:  Activity Level: Dangle Side of Bed  Number times ambulated in hallways past shift: 0  Number of times OOB to chair past shift: 0    Cardiac:   Cardiac Monitoring: Yes           Access:   Current line(s): PIV     Genitourinary:   Urinary status: voiding    Respiratory:   O2 Device: None (Room air)  Chronic home O2 use?: NO       GI:  Last Bowel Movement Date: 04/14/21  Current diet:  DIET REGULAR  Passing flatus: YES  Tolerating current diet: YES       Pain Management:   Patient states pain is manageable on current regimen: YES    Skin:  Dejuan Score: 13  Interventions: float heels, increase time out of bed and nutritional support     Patient Safety:  Fall Score:  Total Score: 3  Interventions: gripper socks, pt to call before getting OOB and stay with me (per policy)  High Fall Risk: Yes    Length of Stay:  Expected LOS: - - -  Actual LOS: 0      March Mark

## 2021-04-15 NOTE — PROGRESS NOTES
Surgery    Looking at photos in chart it looks like he would need an amputation. He was seen in Dec 2020 by ortho. I do not do amputations.     Favor ortho/vascular consultation  Discussed with Dr Marvene Halsted, MD FACS

## 2021-04-15 NOTE — PROGRESS NOTES
Pharmacy Medication Reconciliation     The patient was interviewed regarding current PTA medication list, use and drug allergies; Patient present in room and obtained permission from patient to discuss drug regimen with visitor(s) present. The patient was questioned regarding use of any other inhalers, topical products, over the counter medications, herbal medications, vitamin products or ophthalmic/nasal/otic medication use. Allergy Update: Sulfamethoxazole-trimethoprim, Ciprofloxacin, Codeine, Cymbalta [duloxetine], Lyrica [pregabalin], Sulfa (sulfonamide antibiotics), Ultram [tramadol], Vancomycin, and Zosyn [piperacillin-tazobactam]    Recommendations/Findings: The following amendments were made to the patient's active medication list on file at [facility]:   1) Additions:   -Colchicine 0.6 mg    2) Deletions: none    3) Changes: -(Old regimen: senna-docusate 8.6-50 mg 1 tablet by mouth daily /New regimen: senna-docusate 8.6-50 mg 1 tablet by mouth as needed )    Pertinent Findings:   -Needs to restart the Chantix Starter Box since he missed more than a week of the regimen  -Can only tolerate current gabapentin dose of 300 mg TID    -Clarified PTA med list with patient. PTA medication list was corrected to the following:     Prior to Admission Medications   Prescriptions Last Dose Informant Taking?   allopurinol (ZYLOPRIM) 100 mg tablet  Self Yes   Sig: Take 1 Tab by mouth daily. Indications: treatment to prevent acute gout attack   aspirin 81 mg chewable tablet  Self Yes   Sig: Take 81 mg by mouth daily. colchicine 0.6 mg tablet   Yes   Sig: Take 0.6 mg by mouth daily as needed for Gout or Pain. Indications: acute inflammation of the joints due to gout attack   gabapentin (NEURONTIN) 100 mg capsule  Significant Other Yes   Sig: Take 100 mg by mouth three (3) times daily. levothyroxine (SYNTHROID) 112 mcg tablet  Self Yes   Sig: Take 112 mcg by mouth Daily (before breakfast).    lidocaine (XYLOCAINE) 4 % topical cream  Self Yes   Sig: Apply  to affected area as needed for Pain (apply to left lower leg wounds during dressing changes). lisinopriL (PRINIVIL, ZESTRIL) 20 mg tablet  Self Yes   Sig: Take 20 mg by mouth daily. melatonin 3 mg tablet  Self Yes   Sig: Take 1 Tab by mouth nightly as needed. methadone (DOLOPHINE) 10 mg/mL solution  Self Yes   Sig: Take 140 mg by mouth daily. Indications: excessive pain   naproxen sodium (Aleve) 220 mg tablet  Self Yes   Sig: Take 220 mg by mouth three (3) times daily as needed. pantoprazole (PROTONIX) 40 mg tablet  Self Yes   Sig: Take 40 mg by mouth daily as needed. senna-docusate (DARELL-COLACE) 8.6-50 mg per tablet  Self Yes   Sig: Take 1 Tab by mouth daily as needed. Indications: constipation   tamsulosin (FLOMAX) 0.4 mg capsule  Self Yes   Sig: Take 1 capsule by mouth daily after dinner    varenicline (Chantix Starting Month Box) 0.5 mg (11)- 1 mg (42) DsPk   Yes   Sig: Take  by mouth.  Take one tablet by mouth in morning with food for 3 days then increase to 1 tablet by mouth twice daily with food thereafter as directed   Indications: stop smoking      Facility-Administered Medications: None        Thank you,  Zulema Axon

## 2021-04-15 NOTE — TELEPHONE ENCOUNTER
CONSULT    Patient: Romeo García    : [id number]     Referring Physician: Dr Judit Bowman    Reason: Left leg wound/Debridement      Room #: 3437    Nurse: [id number]

## 2021-04-15 NOTE — ED NOTES
Report given to EMERY Jhaveri. They were informed of patient chief complaint, current status, orders completed (to include IV access/medications/radiology testing), outstanding orders that still need to be completed, and the treatment plan. Ensured no questions or concerns regarding the patient prior to departure.

## 2021-04-15 NOTE — PROGRESS NOTES
Pharmacy Automatic Renal Dosing Protocol - Antimicrobials    Indication for Antimicrobials: SSTI     Current Regimen of Each Antimicrobial:  Cefepime 1 g IV Q12H (Start Date 4/15; Day # 1)  Metronidazole 500 mg IV Q8H (Start Date 4/15; Day #1)    Previous Antimicrobial Therapy:  Zosyn x1    Goal Level: VANCOMYCIN TROUGH GOAL RANGE    Vancomycin Trough Other:   (AUC: 400 - 600 mg/hr/Liter/day)     Date Dose & Interval Measured (mcg/mL) Extrapolated (mcg/mL)                       Date & time of next level: NA    Significant Cultures:   Blood cultures pending    Radiology / Imaging results: (X-ray, CT scan or MRI):     Paralysis, amputations, malnutrition:     Labs:  Recent Labs     04/15/21  0704   CREA 0.75   BUN 5*   WBC 25.2*     Temp (24hrs), Av.1 °F (37.8 °C), Min:98.7 °F (37.1 °C), Max:101.4 °F (38.6 °C)    Is the Patient on Dialysis? No    Creatinine Clearance (mL/min):   CrCl (Actual Body Weight): 168.0  CrCl (Adjusted Body Weight): 126.7  CrCl (Ideal Body Weight): 99.2    Impression/Plan:   Cefepime 2 g IV Q12H per protocol for SSTI  Metronidazole okay, consider switch interval to q12h  Antimicrobial stop date TBD     Pharmacy will follow daily and adjust medications as appropriate for renal function and/or serum levels. Thank you,  Emily Edmond, PHARMD    Recommended duration of therapy  [email or web address]/[facility]/Pharmacy/Clinical%20Companion/Duration%20of%20ABX%20therapy. docx    Renal Dosing  [email or web address]/[facility]/Pharmacy/Clinical%20Companion/Renal%[id number]. pdf

## 2021-04-15 NOTE — PROGRESS NOTES

## 2021-04-16 ENCOUNTER — APPOINTMENT (OUTPATIENT)
Dept: MRI IMAGING | Age: 56
DRG: 720 | End: 2021-04-16
Attending: INTERNAL MEDICINE
Payer: COMMERCIAL

## 2021-04-16 LAB
ANION GAP SERPL CALC-SCNC: 4 MMOL/L (ref 5–15)
BASOPHILS # BLD: 0 K/UL (ref 0–0.1)
BASOPHILS NFR BLD: 0 % (ref 0–1)
BUN SERPL-MCNC: 7 MG/DL (ref 6–20)
BUN/CREAT SERPL: 13 (ref 12–20)
CALCIUM SERPL-MCNC: 8.1 MG/DL (ref 8.5–10.1)
CHLORIDE SERPL-SCNC: 104 MMOL/L (ref 97–108)
CO2 SERPL-SCNC: 29 MMOL/L (ref 21–32)
CREAT SERPL-MCNC: 0.54 MG/DL (ref 0.7–1.3)
DIFFERENTIAL METHOD BLD: ABNORMAL
EOSINOPHIL # BLD: 0.8 K/UL (ref 0–0.4)
EOSINOPHIL NFR BLD: 5 % (ref 0–7)
ERYTHROCYTE [DISTWIDTH] IN BLOOD BY AUTOMATED COUNT: 18.3 % (ref 11.5–14.5)
EST. AVERAGE GLUCOSE BLD GHB EST-MCNC: 117 MG/DL
GLUCOSE SERPL-MCNC: 120 MG/DL (ref 65–100)
HBA1C MFR BLD: 5.7 % (ref 4–5.6)
HCT VFR BLD AUTO: 30.9 % (ref 36.6–50.3)
HGB BLD-MCNC: 9.9 G/DL (ref 12.1–17)
IMM GRANULOCYTES # BLD AUTO: 0.3 K/UL (ref 0–0.04)
IMM GRANULOCYTES NFR BLD AUTO: 2 % (ref 0–0.5)
LYMPHOCYTES # BLD: 1 K/UL (ref 0.8–3.5)
LYMPHOCYTES NFR BLD: 6 % (ref 12–49)
MCH RBC QN AUTO: 28.8 PG (ref 26–34)
MCHC RBC AUTO-ENTMCNC: 32 G/DL (ref 30–36.5)
MCV RBC AUTO: 89.8 FL (ref 80–99)
MONOCYTES # BLD: 1.7 K/UL (ref 0–1)
MONOCYTES NFR BLD: 10 % (ref 5–13)
NEUTS SEG # BLD: 13.1 K/UL (ref 1.8–8)
NEUTS SEG NFR BLD: 77 % (ref 32–75)
NRBC # BLD: 0 K/UL (ref 0–0.01)
NRBC BLD-RTO: 0 PER 100 WBC
PLATELET # BLD AUTO: 160 K/UL (ref 150–400)
PMV BLD AUTO: 9.1 FL (ref 8.9–12.9)
POTASSIUM SERPL-SCNC: 4 MMOL/L (ref 3.5–5.1)
RBC # BLD AUTO: 3.44 M/UL (ref 4.1–5.7)
RBC MORPH BLD: ABNORMAL
SODIUM SERPL-SCNC: 137 MMOL/L (ref 136–145)
WBC # BLD AUTO: 16.9 K/UL (ref 4.1–11.1)

## 2021-04-16 PROCEDURE — 87102 FUNGUS ISOLATION CULTURE: CPT

## 2021-04-16 PROCEDURE — 99223 1ST HOSP IP/OBS HIGH 75: CPT | Performed by: INTERNAL MEDICINE

## 2021-04-16 PROCEDURE — 77030040718 HC DRSG HYDRGEL SKINTEGRITY MDII -A

## 2021-04-16 PROCEDURE — 87070 CULTURE OTHR SPECIMN AEROBIC: CPT

## 2021-04-16 PROCEDURE — 87077 CULTURE AEROBIC IDENTIFY: CPT

## 2021-04-16 PROCEDURE — 74011250637 HC RX REV CODE- 250/637: Performed by: INTERNAL MEDICINE

## 2021-04-16 PROCEDURE — 74011250636 HC RX REV CODE- 250/636: Performed by: INTERNAL MEDICINE

## 2021-04-16 PROCEDURE — 2709999900 HC NON-CHARGEABLE SUPPLY

## 2021-04-16 PROCEDURE — 83036 HEMOGLOBIN GLYCOSYLATED A1C: CPT

## 2021-04-16 PROCEDURE — 87075 CULTR BACTERIA EXCEPT BLOOD: CPT

## 2021-04-16 PROCEDURE — 80048 BASIC METABOLIC PNL TOTAL CA: CPT

## 2021-04-16 PROCEDURE — 85025 COMPLETE CBC W/AUTO DIFF WBC: CPT

## 2021-04-16 PROCEDURE — 74011250636 HC RX REV CODE- 250/636: Performed by: NURSE PRACTITIONER

## 2021-04-16 PROCEDURE — 36415 COLL VENOUS BLD VENIPUNCTURE: CPT

## 2021-04-16 PROCEDURE — 87186 SC STD MICRODIL/AGAR DIL: CPT

## 2021-04-16 PROCEDURE — 65660000000 HC RM CCU STEPDOWN

## 2021-04-16 PROCEDURE — 74011000258 HC RX REV CODE- 258: Performed by: INTERNAL MEDICINE

## 2021-04-16 RX ORDER — METRONIDAZOLE 500 MG/100ML
500 INJECTION, SOLUTION INTRAVENOUS EVERY 8 HOURS
Status: DISCONTINUED | OUTPATIENT
Start: 2021-04-16 | End: 2021-04-19

## 2021-04-16 RX ORDER — LINEZOLID 2 MG/ML
600 INJECTION, SOLUTION INTRAVENOUS EVERY 12 HOURS
Status: DISCONTINUED | OUTPATIENT
Start: 2021-04-16 | End: 2021-04-21

## 2021-04-16 RX ORDER — LORAZEPAM 2 MG/ML
1 INJECTION INTRAMUSCULAR
Status: COMPLETED | OUTPATIENT
Start: 2021-04-16 | End: 2021-04-19

## 2021-04-16 RX ORDER — LEVOTHYROXINE SODIUM 112 UG/1
112 TABLET ORAL
Status: DISCONTINUED | OUTPATIENT
Start: 2021-04-17 | End: 2021-04-29 | Stop reason: HOSPADM

## 2021-04-16 RX ORDER — MORPHINE SULFATE 2 MG/ML
1 INJECTION, SOLUTION INTRAMUSCULAR; INTRAVENOUS
Status: DISCONTINUED | OUTPATIENT
Start: 2021-04-16 | End: 2021-04-20

## 2021-04-16 RX ORDER — LIDOCAINE 40 MG/G
CREAM TOPICAL DAILY
Status: DISCONTINUED | OUTPATIENT
Start: 2021-04-17 | End: 2021-04-29 | Stop reason: HOSPADM

## 2021-04-16 RX ORDER — FENTANYL CITRATE 50 UG/ML
50 INJECTION, SOLUTION INTRAMUSCULAR; INTRAVENOUS ONCE
Status: COMPLETED | OUTPATIENT
Start: 2021-04-16 | End: 2021-04-16

## 2021-04-16 RX ORDER — LIDOCAINE 40 MG/G
CREAM TOPICAL AS NEEDED
Status: DISCONTINUED | OUTPATIENT
Start: 2021-04-16 | End: 2021-04-16

## 2021-04-16 RX ADMIN — CEFEPIME HYDROCHLORIDE 2 G: 2 INJECTION, POWDER, FOR SOLUTION INTRAVENOUS at 20:36

## 2021-04-16 RX ADMIN — GABAPENTIN 100 MG: 100 CAPSULE ORAL at 17:19

## 2021-04-16 RX ADMIN — CEFEPIME HYDROCHLORIDE 2 G: 2 INJECTION, POWDER, FOR SOLUTION INTRAVENOUS at 12:46

## 2021-04-16 RX ADMIN — GABAPENTIN 100 MG: 100 CAPSULE ORAL at 08:00

## 2021-04-16 RX ADMIN — OXYCODONE HYDROCHLORIDE AND ACETAMINOPHEN 1 TABLET: 5; 325 TABLET ORAL at 20:37

## 2021-04-16 RX ADMIN — METRONIDAZOLE 500 MG: 500 INJECTION, SOLUTION INTRAVENOUS at 13:49

## 2021-04-16 RX ADMIN — METRONIDAZOLE 500 MG: 500 INJECTION, SOLUTION INTRAVENOUS at 02:04

## 2021-04-16 RX ADMIN — FENTANYL CITRATE 50 MCG: 50 INJECTION, SOLUTION INTRAMUSCULAR; INTRAVENOUS at 02:32

## 2021-04-16 RX ADMIN — MORPHINE SULFATE 1 MG: 2 INJECTION, SOLUTION INTRAMUSCULAR; INTRAVENOUS at 22:23

## 2021-04-16 RX ADMIN — METRONIDAZOLE 500 MG: 500 INJECTION, SOLUTION INTRAVENOUS at 22:23

## 2021-04-16 RX ADMIN — MORPHINE SULFATE 1 MG: 2 INJECTION, SOLUTION INTRAMUSCULAR; INTRAVENOUS at 18:57

## 2021-04-16 RX ADMIN — Medication 10 ML: at 14:53

## 2021-04-16 RX ADMIN — OXYCODONE HYDROCHLORIDE AND ACETAMINOPHEN 1 TABLET: 5; 325 TABLET ORAL at 17:18

## 2021-04-16 RX ADMIN — ALLOPURINOL 100 MG: 100 TABLET ORAL at 08:01

## 2021-04-16 RX ADMIN — TAMSULOSIN HYDROCHLORIDE 0.4 MG: 0.4 CAPSULE ORAL at 08:01

## 2021-04-16 RX ADMIN — Medication 10 ML: at 22:23

## 2021-04-16 RX ADMIN — OXYCODONE HYDROCHLORIDE AND ACETAMINOPHEN 1 TABLET: 5; 325 TABLET ORAL at 06:57

## 2021-04-16 RX ADMIN — GABAPENTIN 100 MG: 100 CAPSULE ORAL at 22:27

## 2021-04-16 RX ADMIN — VARENICLINE TARTRATE 0.5 MG: 0.5 TABLET, FILM COATED ORAL at 13:51

## 2021-04-16 RX ADMIN — LINEZOLID 600 MG: 600 INJECTION, SOLUTION INTRAVENOUS at 14:53

## 2021-04-16 RX ADMIN — MORPHINE SULFATE 1 MG: 2 INJECTION, SOLUTION INTRAMUSCULAR; INTRAVENOUS at 12:45

## 2021-04-16 RX ADMIN — METHADONE HYDROCHLORIDE 140 MG: 10 CONCENTRATE ORAL at 08:00

## 2021-04-16 RX ADMIN — LEVOTHYROXINE SODIUM 112 MCG: 0.11 TABLET ORAL at 07:24

## 2021-04-16 NOTE — WOUND CARE
Wound Care Consult for the Left leg and foot wounds that were present on admission. Pt. Has been dealing with his leg and foot wounds for about 3 years +. He has been to multiple physicians and clinics and has a care giver named Amilcar Jovel who does his wound care. Assessment: the left lower leg has necrotic tissue in the wound with only a few small places where there is any pink wound tissue. Most of the wound is covered with wet and yellow eschar and the leg skin surrounding it is inflammed / painful. Patient had a head injury from a     4-: Necrotic tissue in 90% of the wound. 4- - Left foot wound. The toes appear to be fused but   They are not. This was dressed with Xeroform gauze weaved  In and out between the toes. The white cream pictured is   Lidocaine Cream 4% applied for 5 min prior to the wound   Cleansing. Treatment: After obtaining cultures of the wound from the same place in the left lower leg (after irrigating with NS), the wound was cleansed with Caraklenz spray and wiped with gauze, using two boxes of gauze to wipe it well. Mepitel One silicone dressings were applied to the wound base to cover most of the wounds. Then Xeroform gauze was doubled and applied to the wound bed and covered with OptiLock absorbent dressing. Wrapped with Jamel Parlor. The left-over gauze was used to fill in the rest of the gaps and taped to secure the dressings. Plan:  The same wound Care can be done daily to keep the wound moist as patient has refused the Betadine dressings. He stated that it burns him too bad. Wound care:  Unwrap the current dressing but leave the Silicone dressings in place at the base of the wound. This is the Non-stick layer. Spray the wound with Caraklenz spray and press on the wound to move the solution throughout the wound bed. Apply new Xeroform gauze dressings (stretched out and folded in half to cover most of the wound (this takes about 6 Xeroform gauzes.  Cover with Absorbent dressings like ABD's or Optilock pads and Wrap with William, NOT Kerlix. Tape to secure the dressings and write the date / time on each dressing. Ruddy Charles RN, BSN, CWON     Float the heels. Addendum:  Silvadene would be a potential Solution to the Bacterial load but he is allergic to Sulfa.

## 2021-04-16 NOTE — PROGRESS NOTES
End of Shift Note    Bedside shift change report given to Mai Gan (oncoming nurse) by Lissa Choi (offgoing nurse). Report included the following information SBAR, Kardex, Procedure Summary, Intake/Output and Accordion    Shift worked:  7-7pm   Shift summary and any significant changes:      Constant leg pain.  PRN meds are given     Concerns for physician to address:    Zone phone for oncoming shift:   [address] [facility],[address]

## 2021-04-16 NOTE — PROGRESS NOTES
Mr Su Nice has been dismissed from our practice for extreme non-compliance, narcotic seeking, and threatening behavior. We will not be involved in his care. He has no arterial vasc insuff having had angiogram by Dr Yayo Rankin ([facility]) at [facility]. He has long-standing neglected wounds (initiallly venous) and has been advised multiple members of our group and several second opinion surgeons that he needs AKA. Would defer to orthopedist during this hospitalization if he is agreeable to amp. Danie rajput.

## 2021-04-16 NOTE — PROGRESS NOTES
End of Shift Note    Bedside shift change report given to EMERY Arechiga (oncoming nurse) by Braxton Fisher (offgoing nurse). Report included the following information SBAR, Kardex, ED Summary, Intake/Output and MAR    Shift worked:  [id number]     Shift summary and any significant changes:     Patient declined to do MRI tonight 4/15/21 . Called MRI to notify. Patient states it was due to amount of pain. 0200 Messaged NP regarding patient's pain. NP ordered one time dose on fentanyl 50 mcg. Concerns for physician to address:  above     Zone phone for oncoming shift:          Activity:  Activity Level: Dangle Side of Bed  Number times ambulated in hallways past shift: 0  Number of times OOB to chair past shift: 0    Cardiac:   Cardiac Monitoring: Yes           Access:   Current line(s): PIV     Genitourinary:   Urinary status: voiding    Respiratory:   O2 Device: None (Room air)  Chronic home O2 use?: NO  Incentive spirometer at bedside: NO     GI:  Last Bowel Movement Date: 04/14/21  Current diet:  DIET REGULAR  Passing flatus: YES  Tolerating current diet: YES       Pain Management:   Patient states pain is manageable on current regimen: N/A    Skin:  Dejuan Score: 13  Interventions: increase time out of bed, limit briefs and nutritional support     Patient Safety:  Fall Score:  Total Score: 3  Interventions: bed/chair alarm, assistive device (walker, cane, etc), gripper socks and pt to call before getting OOB  High Fall Risk: Yes    Length of Stay:  Expected LOS: - - -  Actual LOS: [address]

## 2021-04-16 NOTE — PROGRESS NOTES
Called for pt. to come down to MRI; pt. refusing exam at this time. Please let MRI know (ext [phone]) when he is willing to complete the study.  Miguel Hernandez

## 2021-04-16 NOTE — PROGRESS NOTES
Received notification from bedside RN about patient with regards to: requesting supplemental pain medication , not yet time for Oxycodone  VS: /73, HR 86, RR 18, O2 sat 93% on RA    Intervention given: Fentanyl 50 mcg x 1 dose ordered    0644: requesting Ativan prior to MRI secondary to claustrophobia  VS: /69, HR 79, RR 16, O2 sat 93%    - Ativan 1 mg IV on call to MRI ordered

## 2021-04-16 NOTE — CONSULTS
Infectious Disease Consult  Chante Peacock MD [facility]    Date of Consultation:  4/16/21  Date of Admission: 4/15/2021   Referring Physician: Dr Andres Mortensen:     Patient is a 63 y.o. 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, s/p recent treatment with 5/6 weeks of Zosyn IV at [facility]  · H/o Pyoderma gangrenosum vs malignancy on pathology in 4/19  · S/p intra lesional steroids by Dermatology  · Pt has declined amputation that has been recommende by multiple subspecialtues  · Chronic pain syndrome on methadone   · H/o hemorrhagic stroke with residual left sided weakness  · Seizure disorder  · Hypertension stable  · Hypothyroidism on synthroid       PLAN:      · Wound culture - aerobic,anaerobic, fungal cultures  · Empiric antimicrobials  · Recommend amputation per Podiatry, Vascular recommendations  · Recommend repeat biopsy in multiple areas of wound, development of malignancy due to chronicity of wound needs to be excluded. · D/w Dr Jodi Titus, 63 y.o. male with PMHx significant for prior CVA, seizures, prior DVT, GERD, bladder cancer, chronic nonhealing   wound on his left lower extremity x 2 years , recommendation for BKA by multiple specialties, but pt continues to refuse. Pathology on 4/25/19 was + for Pyoderma gangrenosum vs malignancy. Patient  presented with concerns for worsening infection of the left lower extremity wound. Patient reports that the wound has been present for two and half years . He had previously been on Zosyn iv  via PICC line for reported Pseudomonas and E. coli infection. He has been off antibiotic since the middle of March. Per ED note Caregiver reported that over the last 24 hours the leg had gotten worse with spreading redness above the area of the chronic wound. Patient had reported chronic pain in the area that is not significantly changed. No fevers at home. No chest pain, shortness of breath, abdominal pain, nausea, vomiting. Per Records pt was admitted at hospital at [place]. 2/9-2/26  Odenathus.Plump y.o. male who presented to ED for IV antibiotics he has a chronic left lower extremity wound and history of pyoderma gangrenosum. He has been getting steroid injections and reports that his leg has been doing better however apparently he had a culture done reasons unclear that showed ESBL and Pseudomonas and he was sent in for IV antibiotics. He denies any fevers or chills or worsening of his chronic wound. He has chronic pain which is unchanged. The dermatologist to send him into the hospital was not reachable tonight. ( Dr. Blanka Young) patient has had this left lower extremity ulceration for over 2 years and maybe it has been worsening but he was a very poor historian. He had been taking steroids every 4 to 6 weeks and thought it was improving and scabbing over and not draining as much       Problems Managed During Hospitalization:  1. Infected Chronic LLE wounds with cellulitis with possible osteomyelitis of the lateral malleolus   Wounds have been present by report for 2 1/2 yrs. -Chronic extensive LLE Pyoderma gangrenosum dx'ed 11/2020 with superimposed infected wounds with cellulitis and CT suggestive of lateral malleolus OM   -PG treated with intralesional steroid injections per Dermatology as OP Dr Blanka Young  -Unclear if he ever received immunomodulator as his PG appears severe  -His cultures showed pseudomonas and ESBL ( ?colonization vs true infection)Currently on zosyn and ID following  -He could not tolerate MRI x 2 even after benzo for anxiety   -He was seen by vascular and Podiatry, offered BKA.  PATIENT DECLINED AMPUTATION and would like to continue with his dermatologist and plastic  -He had debridements in the past but unclear if it helps or exacerbates PG( Pathergy)  -seen in consult by ID and has PICC placed for prolonged course of IV antibiotics per ID. 6 weeks of antibiotics   -confirmed his desire for no amputation. ID has discussed about possible outcomes including persistence or worsening of infection including sepsis and death despite long-term abx with his decision to not have amputation. He expresses understanding. Pt seen today . Appears drowsy . S/p Wound care . D/w Wound Care . Infected & necrotic wounds present. Patient Active Problem List   Diagnosis Code    Stroke ([facility] I63.9    Hypertension I10    Thromboembolism ([facility] I74.9    Cerebral hemorrhage with hemiparesis (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 term current use of methadone for pain control Z79.891    Major depressive disorder with current active episode F32.9    Physical debility R53.81    Malignant neoplasm of urinary bladder (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) F11.20    Chronic obstructive pulmonary disease ([facility]) J44.9    Chronic pain disorder G89.4    Hypokalemia E87.6    Seizure disorder ([facility]) G40.909    Tobacco abuse Z72.0    Foot ulcer ([facility]) L97.509    Nonadherence to medical treatment Z91.19    Sinus bradycardia R00.1    Gout M10.9    Hypothyroidism E03.9    Foot infection L08.9    Depression F32.9    Open wound, lower leg S81.809A    Pyoderma gangrenosum L88    Traumatic subarachnoid hemorrhage ([facility]) I42.8H5G    Traumatic subarachnoid hemorrhage without loss of consciousness ([address] S06.6X0A    Infected wound T14. 8XXA, L08.9    Sepsis ([address] A41.9     Past Medical History:   Diagnosis Date    Aneurysm [facility])     (with stroke)    Bladder tumor     Cancer ([facility]     bladder CA    Chronic pain     Family history of bladder cancer     GERD (gastroesophageal reflux disease)     Gout     History of vascular access device 04/25/2019    [facility] VAT 4 FR MIDLINE R Cephalic Limited access    History of vascular access device 04/26/2019    4 fr Midline right Cephalic midline access    Hypercholesterolemia     Hypertension     Lesion of bladder     Seizures ([facility] [facility]     Stroke ([facility] [address] 2006    left sided weakness    Thromboembolus ([facility] [address] [facility]     left leg    Thyroid disease     TIA (transient ischemic attack) 2012      Family History   Problem Relation Age of Onset    Diabetes Father     Lung Disease Father     Diabetes Sister     Diabetes Brother     Cancer Paternal Grandfather         Bladder      Social History     Tobacco Use    Smoking status: Current Every Day Smoker     Packs/day: 0.50    Smokeless tobacco: Never Used    Tobacco comment: instructed not to smoke 24 hrs prior surgery   Substance Use Topics    Alcohol use: Yes     Alcohol/week: 6.0 standard drinks     Types: 6 Cans of beer per week     Comment: occasional     Past Surgical History:   Procedure Laterality Date    HX ORTHOPAEDIC      R arthroscopy    HX OTHER SURGICAL      x2 L foot    HX UROLOGICAL  04/20/2018    Cystoscopy, TURBT (greater than 5 cm resected) Dr. Trevon Sorto, [facility].  KNEE ARTHROSCP HARV      left knee    TRANSURETHRAL RESEC BLADDER NECK  08/10/2018      Prior to Admission medications    Medication Sig Start Date End Date Taking? Authorizing Provider   colchicine 0.6 mg tablet Take 0.6 mg by mouth daily as needed for Gout or Pain. Indications: acute inflammation of the joints due to gout attack   Yes Provider, Historical   varenicline (Chantix Starting Month Box) 0.5 mg (11)- 1 mg (42) DsPk Take  by mouth.  Take one tablet by mouth in morning with food for 3 days then increase to 1 tablet by mouth twice daily with food thereafter as directed   Indications: stop smoking   Yes Provider, Historical   tamsulosin (FLOMAX) 0.4 mg capsule Take 1 capsule by mouth daily after dinner  3/2/21  Yes Kyle Albarran MD   lidocaine (XYLOCAINE) 4 % topical cream Apply  to affected area as needed for Pain (apply to left lower leg wounds during dressing changes). 12/22/20  Yes Fifi Kent MD   gabapentin (NEURONTIN) 100 mg capsule Take 100 mg by mouth three (3) times daily. Yes Provider, Historical   levothyroxine (SYNTHROID) 112 mcg tablet Take 112 mcg by mouth Daily (before breakfast). Yes Provider, Historical   lisinopriL (PRINIVIL, ZESTRIL) 20 mg tablet Take 20 mg by mouth daily. Yes Provider, Historical   naproxen sodium (Aleve) 220 mg tablet Take 220 mg by mouth three (3) times daily as needed. Yes Provider, Historical   aspirin 81 mg chewable tablet Take 81 mg by mouth daily. Yes Provider, Historical   pantoprazole (PROTONIX) 40 mg tablet Take 40 mg by mouth daily as needed. 7/17/19  Yes Provider, Historical   allopurinol (ZYLOPRIM) 100 mg tablet Take 1 Tab by mouth daily. Indications: treatment to prevent acute gout attack 5/24/19  Yes Trisha Maguire NP   senna-docusate (DARELL-COLACE) 8.6-50 mg per tablet Take 1 Tab by mouth daily as needed. Indications: constipation   Yes Provider, Historical   methadone (DOLOPHINE) 10 mg/mL solution Take 140 mg by mouth daily. Indications: excessive pain   Yes Provider, Historical   melatonin 3 mg tablet Take 1 Tab by mouth nightly as needed.  10/12/18  Yes Khris Diallo MD     Allergies   Allergen Reactions    Sulfamethoxazole-Trimethoprim Rash     L eye and L hand    Ciprofloxacin Hives     Per pcp records    Codeine Hives     Tolerates dilaudid, oxycodone    Cymbalta [Duloxetine] Other (comments)     Confusion and memory loss    Lyrica [Pregabalin] Hives    Sulfa (Sulfonamide Antibiotics) Rash    Ultram [Tramadol] Hives    Vancomycin Shortness of Breath    Zosyn [Piperacillin-Tazobactam] Rash        Review of Systems:  A comprehensive review of systems was negative except for that written in the History of Present Illness. 10 point review of systems obtained . All other systems negative    Objective:   Blood pressure 128/67, pulse 74, temperature 98.4 °F (36.9 °C), resp. rate 19, height 5' 6\" (1.676 m), weight 240 lb (108.9 kg), SpO2 95 %. Temp (24hrs), Av.2 °F (36.8 °C), Min:97.9 °F (36.6 °C), Max:98.5 °F (36.9 °C)    Current Facility-Administered Medications   Medication Dose Route Frequency    acetaminophen (TYLENOL) tablet 650 mg  650 mg Oral Q6H PRN    ondansetron (ZOFRAN) injection 4 mg  4 mg IntraVENous Q6H PRN    polyethylene glycol (MIRALAX) packet 17 g  17 g Oral DAILY    LORazepam (ATIVAN) injection 1 mg  1 mg IntraVENous ON CALL    morphine injection 1 mg  1 mg IntraVENous Q4H PRN    levothyroxine (SYNTHROID) tablet 112 mcg  112 mcg Oral ACB    metroNIDAZOLE (FLAGYL) IVPB premix 500 mg  500 mg IntraVENous Q8H    linezolid in dextrose 5% (ZYVOX) IVPB premix in D5W 600 mg  600 mg IntraVENous Q12H    cefepime (MAXIPIME) 2 g in 0.9% sodium chloride (MBP/ADV) 100 mL MBP  2 g IntraVENous Q8H    lidocaine (XYLOCAINE) 4 % cream   Topical DAILY    sodium chloride (NS) flush 5-10 mL  5-10 mL IntraVENous PRN    allopurinoL (ZYLOPRIM) tablet 100 mg  100 mg Oral DAILY    gabapentin (NEURONTIN) capsule 100 mg  100 mg Oral TID    melatonin tablet 3 mg  3 mg Oral QHS PRN    tamsulosin (FLOMAX) capsule 0.4 mg  0.4 mg Oral DAILY    methadone (DOLOPHINE) 10 mg/mL concentrated solution 140 mg  140 mg Oral DAILY    varenicline (CHANTIX) 0.5 mg tablet 0.5 mg  0.5 mg Oral BIDPC    Followed by   Tessie Felty ON 2021] varenicline (CHANTIX) 0.5 mg tablet 1 mg  1 mg Oral BIDPC    oxyCODONE-acetaminophen (PERCOCET) 5-325 mg per tablet 1 Tab  1 Tab Oral Q4H PRN        Exam:    General:  Awake, drowsy ,cooperative,    Eyes:  Sclera anicteric.  Pupils equally round and reactive to light. Mouth/Throat: Mucous membranes , oral pharynx mildly dry   Neck: Supple   Lungs:   Reduced auscultation bilaterally, good effort   CV:  Regular rate and rhythm,no murmur, click, rub or gallop   Abdomen:   Soft, non-tender. bowel sounds normal. non-distended   Extremities: No  edema   Skin: Skin color, texture, turgor normal. no acute rash or lesions   Lymph nodes: Cervical and supraclavicular normal   Musculoskeletal: LLE dressing +   Lines/Devices:  Intact, no erythema, drainage or tenderness   Psych: Awake  and oriented, normal mood affect        Data Review:   CBC:   Recent Labs     04/18/21  0357 04/17/21  0123 04/16/21  0350   WBC 12.2* 13.0* 16.9*   RBC 3.68* 3.48* 3.44*   HGB 10.2* 9.9* 9.9*   HCT 33.2* 31.0* 30.9*    179 160   GRANS 76* 71 77*   LYMPH 7* 9* 6*   EOS 6 7 5     CMP:   Recent Labs     04/18/21  0357 04/17/21  0123 04/16/21  0350   GLU 88 114* 120*   * 135* 137   K 4.1 4.0 4.0    103 104   CO2 27 27 29   BUN 6 5* 7   CREA 0.54* 0.61* 0.54*   CA 8.9 8.4* 8.1*   AGAP 5 5 4*   BUCR 11* 8* 13       Lab Results   Component Value Date/Time    Culture result: NO ANAEROBES ISOLATED 04/16/2021 02:36 PM    Culture result: LIGHT ENTEROCOCCUS SPECIES (A) 04/16/2021 02:35 PM    Culture result: LIGHT MULTIPLE GRAM NEGATIVE RODS (A) 04/16/2021 02:35 PM    Culture result: NO GROWTH 3 DAYS 04/15/2021 07:20 AM    Culture result: NO GROWTH 3 DAYS 04/15/2021 07:04 AM          XR Results (most recent):  Results from Hospital Encounter encounter on 04/15/21   XR ANKLE LT AP/LAT    Narrative EXAM: XR ANKLE LT AP/LAT    INDICATION: Trauma. COMPARISON: None. FINDINGS: Two views of the left ankle demonstrate diffuse soft tissue swelling  and osteopenia and mild DJD, no definite fracture. Impression  impression: No acute bone findings suspected. ICD-10-CM ICD-9-CM    1.  Sepsis, due to unspecified organism, unspecified whether acute organ dysfunction present ([facility]  A41.9 038.9      995.91    2. Cellulitis of left lower extremity  L03.116 682.6    3. Wound of left foot  S91.302A 892.0            Antibiotic History  Current Regimen of Each Antimicrobial:  Cefepime 2 g IV Q8H (Start Date 4/15; Day # 4)  Metronidazole 500 mg IV Q8H (Start Date 4/15; Day #4)  Linezolid 600 mg PO BID (Start Date 4/15; Day 4)     Previous Antimicrobial Therapy:  Zosyn x1  Vancomycin - pharmacy to dose        S/p Zosyn IV - x 5 weeks , last dose 3/21/21  I have discussed the diagnosis with the patient and the intended plan as seen in the above orders. I have discussed medication side effects and warnings with the patient as well.     Reviewed test results at length with patient    Signed By: Toyin Powell MD FACP

## 2021-04-16 NOTE — PROGRESS NOTES
Hospitalist Progress Note    NAME: Marah Rowell   :  1965   MRN:  [id number]       Assessment / Plan:    Sepsis POA: Temperature 101.4 at rate 108 WBC 25k  Infected extensive chronic  left lower extremity setting of pyoderma gangrenosum  Leukocytosis improving  Continue current antibiotics including Zyvox, Flagyl and cefepime  Patient is listed allergy to vancomycin  Appreciated orthopedic, vascular surgery in general surgery consult  Recommended amputation but patient is very adamant about not getting amputation  Patient referred to discussed with Ray Ahmadi. I had a detailed discussion with Ms. Nithya Toyin who was very unpleased with patient's care in last 2 years  She recommended that patient \"deserves a chance\". She would want us to continue antibiotics at this point and likely get a repeat biopsy. His biopsy in  showed likely pyoderma gangrenosum  Cultures are awaited  Continue methadone and Percocet for pain control  Use as needed IV morphine for breakthrough pain     History of traumatic subarachnoid hemorrhage in 2020  Avoid NSAIDs and blood thinners  Will place SCDs for DVT prophylaxis  History of gout and osteoarthritis of both knees   Per chart review, patient was supposed to get knee replacement        # Chronic methadone use: Continue home dose methadone once verified by pharmacy  # Hypothyroidism: Continue Synthroid  # HTN: Hold BP meds  # Left sided weakness w/ contracture. Chronic            Code Status: Full code  Surrogate Decision Maker:  Bonnie Betancourt Other Relative [phone]      DVT Prophylaxis: SCDs  GI Prophylaxis: not indicated     Baseline: Ambulatory           Subjective:     Chief Complaint / Reason for Physician Visit  \" Continues to report pain in the left lower extremity. no more fever noticed\". Discussed with RN events overnight.      Review of Systems:  Symptom Y/N Comments  Symptom Y/N Comments   Fever/Chills n   Chest Pain n Poor Appetite n   Edema n    Cough n   Abdominal Pain n    Sputum n   Joint Pain     SOB/BECK n   Pruritis/Rash     Nausea/vomit n   Tolerating PT/OT     Diarrhea n   Tolerating Diet     Constipation n   Other       Could NOT obtain due to:      Objective:     VITALS:   Last 24hrs VS reviewed since prior progress note. Most recent are:  Patient Vitals for the past 24 hrs:   Temp Pulse Resp BP SpO2   04/16/21 1044 98.8 °F (37.1 °C) 84 18 115/60 92 %   04/16/21 0755 97.8 °F (36.6 °C) 79 16 (!) 157/93 93 %   04/16/21 0331 97.9 °F (36.6 °C) 79 16 109/64 93 %   04/15/21 2306 98.6 °F (37 °C) 86 18 (!) 149/73 93 %   04/15/21 2006 98.7 °F (37.1 °C) 85 18 (!) 107/57 94 %   04/15/21 1637 98.3 °F (36.8 °C) 85 20 115/70 93 %       Intake/Output Summary (Last 24 hours) at 4/16/2021 1226  Last data filed at 4/16/2021 0727  Gross per 24 hour   Intake 200 ml   Output 1750 ml   Net -1550 ml        PHYSICAL EXAM:  General: WD, WN. Alert, cooperative, no acute distress    EENT:  EOMI. Anicteric sclerae. MMM  Resp:  CTA bilaterally, no wheezing or rales. No accessory muscle use  CV:  Regular  rhythm,  No edema  GI:  Soft, Non distended, Non tender.  +Bowel sounds  Neurologic:  Alert and oriented X 3, normal speech,   Psych:   Good insight. Not anxious nor agitated  Skin:  Necrosis and gangrenous looking left lower extremity ulceration. Reviewed most current lab test results and cultures  YES  Reviewed most current radiology test results   YES  Review and summation of old records today    NO  Reviewed patient's current orders and MAR    YES  PMH/SH reviewed - no change compared to H&P  ________________________________________________________________________  Care Plan discussed with:    Comments   Patient x    Family  x    RN x    Care Manager     Consultant                        Multidiciplinary team rounds were held today with , nursing, pharmacist and clinical coordinator.   Patient's plan of care was discussed; medications were reviewed and discharge planning was addressed. ________________________________________________________________________  Total NON critical care TIME:  45  Minutes    Total CRITICAL CARE TIME Spent:   Minutes non procedure based      Comments   >50% of visit spent in counseling and coordination of care     ________________________________________________________________________  Linda Weir MD     Procedures: see electronic medical records for all procedures/Xrays and details which were not copied into this note but were reviewed prior to creation of Plan. LABS:  I reviewed today's most current labs and imaging studies.   Pertinent labs include:  Recent Labs     04/16/21  0350 04/15/21  0704   WBC 16.9* 25.2*   HGB 9.9* 12.0*   HCT 30.9* 37.1    215     Recent Labs     04/16/21  0350 04/15/21  0704    133*   K 4.0 4.6    97   CO2 29 33*   * 142*   BUN 7 5*   CREA 0.54* 0.75   CA 8.1* 8.7   ALB  --  2.8*   TBILI  --  0.6   ALT  --  42       Signed: Linda Weir MD

## 2021-04-17 LAB
ANION GAP SERPL CALC-SCNC: 5 MMOL/L (ref 5–15)
BASOPHILS # BLD: 0 K/UL (ref 0–0.1)
BASOPHILS NFR BLD: 0 % (ref 0–1)
BUN SERPL-MCNC: 5 MG/DL (ref 6–20)
BUN/CREAT SERPL: 8 (ref 12–20)
CALCIUM SERPL-MCNC: 8.4 MG/DL (ref 8.5–10.1)
CHLORIDE SERPL-SCNC: 103 MMOL/L (ref 97–108)
CO2 SERPL-SCNC: 27 MMOL/L (ref 21–32)
CREAT SERPL-MCNC: 0.61 MG/DL (ref 0.7–1.3)
DIFFERENTIAL METHOD BLD: ABNORMAL
EOSINOPHIL # BLD: 0.9 K/UL (ref 0–0.4)
EOSINOPHIL NFR BLD: 7 % (ref 0–7)
ERYTHROCYTE [DISTWIDTH] IN BLOOD BY AUTOMATED COUNT: 18 % (ref 11.5–14.5)
GLUCOSE SERPL-MCNC: 114 MG/DL (ref 65–100)
HCT VFR BLD AUTO: 31 % (ref 36.6–50.3)
HGB BLD-MCNC: 9.9 G/DL (ref 12.1–17)
IMM GRANULOCYTES # BLD AUTO: 0.2 K/UL (ref 0–0.04)
IMM GRANULOCYTES NFR BLD AUTO: 2 % (ref 0–0.5)
LYMPHOCYTES # BLD: 1.1 K/UL (ref 0.8–3.5)
LYMPHOCYTES NFR BLD: 9 % (ref 12–49)
MCH RBC QN AUTO: 28.4 PG (ref 26–34)
MCHC RBC AUTO-ENTMCNC: 31.9 G/DL (ref 30–36.5)
MCV RBC AUTO: 89.1 FL (ref 80–99)
MONOCYTES # BLD: 1.4 K/UL (ref 0–1)
MONOCYTES NFR BLD: 11 % (ref 5–13)
NEUTS SEG # BLD: 9.4 K/UL (ref 1.8–8)
NEUTS SEG NFR BLD: 71 % (ref 32–75)
NRBC # BLD: 0 K/UL (ref 0–0.01)
NRBC BLD-RTO: 0 PER 100 WBC
PLATELET # BLD AUTO: 179 K/UL (ref 150–400)
PMV BLD AUTO: 9.9 FL (ref 8.9–12.9)
POTASSIUM SERPL-SCNC: 4 MMOL/L (ref 3.5–5.1)
PROCALCITONIN SERPL-MCNC: 0.12 NG/ML
RBC # BLD AUTO: 3.48 M/UL (ref 4.1–5.7)
SODIUM SERPL-SCNC: 135 MMOL/L (ref 136–145)
WBC # BLD AUTO: 13 K/UL (ref 4.1–11.1)

## 2021-04-17 PROCEDURE — 80048 BASIC METABOLIC PNL TOTAL CA: CPT

## 2021-04-17 PROCEDURE — 74011000250 HC RX REV CODE- 250: Performed by: INTERNAL MEDICINE

## 2021-04-17 PROCEDURE — 2709999900 HC NON-CHARGEABLE SUPPLY

## 2021-04-17 PROCEDURE — 65660000000 HC RM CCU STEPDOWN

## 2021-04-17 PROCEDURE — 74011250636 HC RX REV CODE- 250/636: Performed by: INTERNAL MEDICINE

## 2021-04-17 PROCEDURE — 74011250637 HC RX REV CODE- 250/637: Performed by: INTERNAL MEDICINE

## 2021-04-17 PROCEDURE — 36415 COLL VENOUS BLD VENIPUNCTURE: CPT

## 2021-04-17 PROCEDURE — 85025 COMPLETE CBC W/AUTO DIFF WBC: CPT

## 2021-04-17 PROCEDURE — 84145 PROCALCITONIN (PCT): CPT

## 2021-04-17 PROCEDURE — 74011000258 HC RX REV CODE- 258: Performed by: INTERNAL MEDICINE

## 2021-04-17 RX ORDER — ONDANSETRON 2 MG/ML
4 INJECTION INTRAMUSCULAR; INTRAVENOUS
Status: DISCONTINUED | OUTPATIENT
Start: 2021-04-17 | End: 2021-04-29 | Stop reason: HOSPADM

## 2021-04-17 RX ORDER — POLYETHYLENE GLYCOL 3350 17 G/17G
17 POWDER, FOR SOLUTION ORAL DAILY
Status: DISCONTINUED | OUTPATIENT
Start: 2021-04-17 | End: 2021-04-29 | Stop reason: HOSPADM

## 2021-04-17 RX ADMIN — OXYCODONE HYDROCHLORIDE AND ACETAMINOPHEN 1 TABLET: 5; 325 TABLET ORAL at 21:58

## 2021-04-17 RX ADMIN — MORPHINE SULFATE 1 MG: 2 INJECTION, SOLUTION INTRAMUSCULAR; INTRAVENOUS at 03:42

## 2021-04-17 RX ADMIN — GABAPENTIN 100 MG: 100 CAPSULE ORAL at 21:57

## 2021-04-17 RX ADMIN — METRONIDAZOLE 500 MG: 500 INJECTION, SOLUTION INTRAVENOUS at 14:36

## 2021-04-17 RX ADMIN — CEFEPIME HYDROCHLORIDE 2 G: 2 INJECTION, POWDER, FOR SOLUTION INTRAVENOUS at 21:58

## 2021-04-17 RX ADMIN — ALLOPURINOL 100 MG: 100 TABLET ORAL at 09:03

## 2021-04-17 RX ADMIN — METHADONE HYDROCHLORIDE 140 MG: 10 CONCENTRATE ORAL at 09:03

## 2021-04-17 RX ADMIN — OXYCODONE HYDROCHLORIDE AND ACETAMINOPHEN 1 TABLET: 5; 325 TABLET ORAL at 10:29

## 2021-04-17 RX ADMIN — ONDANSETRON 4 MG: 2 INJECTION INTRAMUSCULAR; INTRAVENOUS at 09:11

## 2021-04-17 RX ADMIN — LINEZOLID 600 MG: 600 INJECTION, SOLUTION INTRAVENOUS at 15:38

## 2021-04-17 RX ADMIN — MORPHINE SULFATE 1 MG: 2 INJECTION, SOLUTION INTRAMUSCULAR; INTRAVENOUS at 13:36

## 2021-04-17 RX ADMIN — LIDOCAINE: 40 CREAM TOPICAL at 13:43

## 2021-04-17 RX ADMIN — LEVOTHYROXINE SODIUM 112 MCG: 0.11 TABLET ORAL at 05:45

## 2021-04-17 RX ADMIN — MORPHINE SULFATE 1 MG: 2 INJECTION, SOLUTION INTRAMUSCULAR; INTRAVENOUS at 20:31

## 2021-04-17 RX ADMIN — OXYCODONE HYDROCHLORIDE AND ACETAMINOPHEN 1 TABLET: 5; 325 TABLET ORAL at 01:12

## 2021-04-17 RX ADMIN — SODIUM CHLORIDE 125 ML/HR: 9 INJECTION, SOLUTION INTRAVENOUS at 01:12

## 2021-04-17 RX ADMIN — GABAPENTIN 100 MG: 100 CAPSULE ORAL at 09:03

## 2021-04-17 RX ADMIN — CEFEPIME HYDROCHLORIDE 2 G: 2 INJECTION, POWDER, FOR SOLUTION INTRAVENOUS at 12:28

## 2021-04-17 RX ADMIN — LINEZOLID 600 MG: 600 INJECTION, SOLUTION INTRAVENOUS at 03:41

## 2021-04-17 RX ADMIN — TAMSULOSIN HYDROCHLORIDE 0.4 MG: 0.4 CAPSULE ORAL at 09:03

## 2021-04-17 RX ADMIN — GABAPENTIN 100 MG: 100 CAPSULE ORAL at 15:39

## 2021-04-17 RX ADMIN — VARENICLINE TARTRATE 0.5 MG: 0.5 TABLET, FILM COATED ORAL at 12:28

## 2021-04-17 RX ADMIN — METRONIDAZOLE 500 MG: 500 INJECTION, SOLUTION INTRAVENOUS at 21:58

## 2021-04-17 RX ADMIN — CEFEPIME HYDROCHLORIDE 2 G: 2 INJECTION, POWDER, FOR SOLUTION INTRAVENOUS at 05:45

## 2021-04-17 RX ADMIN — METRONIDAZOLE 500 MG: 500 INJECTION, SOLUTION INTRAVENOUS at 06:28

## 2021-04-17 RX ADMIN — OXYCODONE HYDROCHLORIDE AND ACETAMINOPHEN 1 TABLET: 5; 325 TABLET ORAL at 17:50

## 2021-04-17 NOTE — PROGRESS NOTES
Pharmacy Automatic Renal Dosing Protocol - Antimicrobials    Indication for Antimicrobials: SSTI     Current Regimen of Each Antimicrobial:  Cefepime 2 g IV Q8H (Start Date 4/15; Day # 3)  Metronidazole 500 mg IV Q8H (Start Date 4/15; Day #)  Linezolid 600 mg PO BID (Start Date 4/15; Day 3)    Previous Antimicrobial Therapy:  Zosyn x1  Vancomycin - pharmacy to dose  Goal Level: VANCOMYCIN TROUGH GOAL RANGE    Vancomycin Trough Other: 10-15  (AUC: 400 - 600 mg/hr/Liter/day)     Date Dose & Interval Measured (mcg/mL) Extrapolated (mcg/mL)                       Date & time of next level: NA    Significant Cultures:   4/15: blood - NGTD - prelim  : wound- NGTD - prelim   anaerobic: prelim  : fungus: prelim    Radiology / Imaging results: (X-ray, CT scan or MRI):     Paralysis, amputations, malnutrition: None    Labs:  Recent Labs     21  0123 21  0350 04/15/21  0704   CREA 0.61* 0.54* 0.75   BUN 5* 7 5*   WBC 13.0* 16.9* 25.2*     Temp (24hrs), Av.6 °F (37 °C), Min:98 °F (36.7 °C), Max:99 °F (37.2 °C)    Is the Patient on Dialysis? No    Creatinine Clearance (mL/min):   CrCl (Actual Body Weight): 181.4  CrCl (Adjusted Body Weight): 152.5  CrCl (Ideal Body Weight): 133.2    Impression/Plan:   Linezolid 600 mg PO BID   Cefepime 2 g IV Q8H per protocol   Metronidazole okay, consider switch interval to q12h once cultures result  Antimicrobial stop date TBD     Pharmacy will follow daily and adjust medications as appropriate for renal function and/or serum levels. Thank you,  ASHLEY Quintanilla    Recommended duration of therapy  [email or web address]/[place]/[facility]/Pharmacy/Clinical%20Companion/Duration%20of%20ABX%20therapy. docx    Renal Dosing  http://[facility]/[place]/[facility]/Pharmacy/Clinical%20Companion/Renal%[email or web address]. pdf

## 2021-04-17 NOTE — PROGRESS NOTES
Spiritual Care Assessment/Progress Note  [facility]      NAME: Carlton Torres      MRN: [id number]  AGE: 63 y.o.  SEX: male  Buddhism Affiliation: Latter day   Language: English     4/17/2021     Total Time (in minutes): 10     Spiritual Assessment begun in [facility] 3 MED TELE through conversation with:         [x]Patient        [] Family    [] Friend(s)        Reason for Consult: Request by patient     Spiritual beliefs: (Please include comment if needed)     [x] Identifies with a kenyd tradition: Latter day        [] Supported by a kendy community:            [] Claims no spiritual orientation:           [] Seeking spiritual identity:                [] Adheres to an individual form of spirituality:           [] Not able to assess:                           Identified resources for coping:      [x] Prayer                               [] Music                  [] Guided Imagery     [] Family/friends                 [] Pet visits     [] Devotional reading                         [] Unknown     [] Other:                                               Interventions offered during this visit: (See comments for more details)    Patient Interventions: Prayer (actual), Prayer (assurance of), Catharsis/review of pertinent events in supportive environment, Initial visit, Initial/Spiritual assessment, patient floor           Plan of Care:     [] Support spiritual and/or cultural needs    [] Support AMD and/or advance care planning process      [] Support grieving process   [] Coordinate Rites and/or Rituals    [] Coordination with community clergy   [] No spiritual needs identified at this time   [] Detailed Plan of Care below (See Comments)  [] Make referral to Music Therapy  [] Make referral to Pet Therapy     [] Make referral to Addiction services  [] Make referral to [facility]  [] Make referral to Spiritual Care Partner  [] No future visits requested        [x] Follow up upon further referrals     Comments:  responded to the request from Mr. Henry. Discussed the plan of care with his bedside RN Geni before and after visit. Patient was alert, no family was present. Patient shared about his current concern that he was told amputation for his legs are required but he does not want to do so. He does not decide his mind yet.  affirmed his struggle and encouraged him to discern the situation carefully with heavenly wisdom.  prayed with him for wisdom and discernment, he was thankful. He requested 's visit again tomorrow afternoon.  will refer him to oncoming . Advised of  availability.       [address] [facility] Nae Monahan., M.S., Th.M.  Spiritual Care Provider   Paging Service [phone])

## 2021-04-17 NOTE — PROGRESS NOTES
End of Shift Note    Bedside shift change report given to [place] (oncoming nurse) by Brooke Lazaro (offgoing nurse).   Report included the following information SBAR, Kardex, Procedure Summary, Intake/Output, MAR and Recent Results    Shift worked:  7-7pm     Shift summary and any significant changes:     No significant changes    Still feels not ready for MRI       Concerns for physician to address:       Zone phone for oncoming shift:          Brooke Lazaro

## 2021-04-17 NOTE — PROGRESS NOTES
End of Shift Note    Bedside shift change report given to EMERY Arechiga (oncoming nurse) by Kaylyn Coto (offgoing nurse). Report included the following information SBAR, Kardex, ED Summary, Intake/Output, MAR and Recent Results    Shift worked:  0626-3343     Shift summary and any significant changes:     Patient asking if he could possibly talk to a . Concerns for physician to address:  none     Zone phone for oncoming shift:          Activity:  Activity Level: Dangle Side of Bed  Number times ambulated in hallways past shift: 0  Number of times OOB to chair past shift: 0    Cardiac:   Cardiac Monitoring: Yes      Cardiac Rhythm: Normal sinus rhythm    Access:   Current line(s): PIV     Genitourinary:   Urinary status: voiding    Respiratory:   O2 Device: None (Room air)  Chronic home O2 use?: NO  Incentive spirometer at bedside: NO     GI:  Last Bowel Movement Date: 04/14/21  Current diet:  DIET REGULAR  Passing flatus: YES  Tolerating current diet: NO       Pain Management:   Patient states pain is manageable on current regimen: NO    Skin:  Dejuan Score: 14  Interventions: increase time out of bed and nutritional support     Patient Safety:  Fall Score:  Total Score: 3  Interventions: bed/chair alarm, assistive device (walker, cane, etc), gripper socks and pt to call before getting OOB  High Fall Risk: Yes    Length of Stay:  Expected LOS: 3d 12h  Actual LOS: [facility]

## 2021-04-17 NOTE — PROGRESS NOTES
Hospitalist Progress Note    NAME: Ting Mack   :  1965   MRN:  [id number]       Assessment / Plan:    Sepsis POA: Temperature 101.4 at rate 108 WBC 25k  Infected extensive chronic  left lower extremity setting of pyoderma gangrenosum  Leukocytosis improving  Continue current antibiotics including Zyvox, Flagyl and cefepime  Patient is listed allergy to vancomycin  Appreciated orthopedic, vascular surgery in general surgery consult  Recommended amputation but initially refused  He reports to me today that he is thinking of changing his mind and may be able to tell us by tomorrow if he wants amputation. His biopsy in 2019 showed likely pyoderma gangrenosum  Cultures are awaited  Continue methadone and Percocet for pain control  Use as needed IV morphine for breakthrough pain  If patient does not amputation we will continue antibiotics and plan for biopsy probably on Monday     History of traumatic subarachnoid hemorrhage in 2020  Avoid NSAIDs and blood thinners  Will place SCDs for DVT prophylaxis  History of gout and osteoarthritis of both knees   Per chart review, patient was supposed to get knee replacement        # Chronic methadone use: Continue home dose methadone   # Hypothyroidism: Continue Synthroid  # HTN: Hold BP meds  # Left sided weakness w/ contracture. Chronic            Code Status: Full code  Surrogate Decision Maker:  Kelin Batres Other Relative [phone]      DVT Prophylaxis: SCDs  GI Prophylaxis: not indicated     Baseline: Ambulatory      Discontinue IV fluids today     Subjective:     Chief Complaint / Reason for Physician Visit  \" Continues to report pain in the left lower extremity. no more fever noticed\". Discussed with RN events overnight.      Review of Systems:  Symptom Y/N Comments  Symptom Y/N Comments   Fever/Chills n   Chest Pain n    Poor Appetite n   Edema n    Cough n   Abdominal Pain n    Sputum n   Joint Pain     SOB/BECK n Pruritis/Rash     Nausea/vomit n   Tolerating PT/OT     Diarrhea n   Tolerating Diet     Constipation n   Other       Could NOT obtain due to:      Objective:     VITALS:   Last 24hrs VS reviewed since prior progress note. Most recent are:  Patient Vitals for the past 24 hrs:   Temp Pulse Resp BP SpO2   04/17/21 0856 98 °F (36.7 °C) 80 18 133/69 96 %   04/17/21 0322 98.4 °F (36.9 °C) 72 18 129/64 95 %   04/16/21 2351 98.6 °F (37 °C) 71 20 117/67 94 %   04/16/21 1854 99 °F (37.2 °C) 94 20 (!) 160/80 97 %   04/16/21 1533 98.9 °F (37.2 °C) 80 18 125/74 93 %       Intake/Output Summary (Last 24 hours) at 4/17/2021 1111  Last data filed at 4/17/2021 0858  Gross per 24 hour   Intake 1200 ml   Output 2400 ml   Net -1200 ml        PHYSICAL EXAM:  General: WD, WN. Alert, cooperative, no acute distress    EENT:  EOMI. Anicteric sclerae. MMM  Resp:  CTA bilaterally, no wheezing or rales. No accessory muscle use  CV:  Regular  rhythm,  No edema  GI:  Soft, Non distended, Non tender.  +Bowel sounds  Neurologic:  Alert and oriented X 3, normal speech,   Psych:   Good insight. Not anxious nor agitated  Skin:  Necrosis and gangrenous looking left lower extremity ulceration. Reviewed most current lab test results and cultures  YES  Reviewed most current radiology test results   YES  Review and summation of old records today    NO  Reviewed patient's current orders and MAR    YES  PMH/ reviewed - no change compared to H&P  ________________________________________________________________________  Care Plan discussed with:    Comments   Patient x    Family      RN x    Care Manager     Consultant                        Multidiciplinary team rounds were held today with , nursing, pharmacist and clinical coordinator. Patient's plan of care was discussed; medications were reviewed and discharge planning was addressed.      ________________________________________________________________________  Total NON critical care TIME:  35  Minutes    Total CRITICAL CARE TIME Spent:   Minutes non procedure based      Comments   >50% of visit spent in counseling and coordination of care     ________________________________________________________________________  Chente Campbell MD     Procedures: see electronic medical records for all procedures/Xrays and details which were not copied into this note but were reviewed prior to creation of Plan. LABS:  I reviewed today's most current labs and imaging studies.   Pertinent labs include:  Recent Labs     04/17/21  0123 04/16/21  0350 04/15/21  0704   WBC 13.0* 16.9* 25.2*   HGB 9.9* 9.9* 12.0*   HCT 31.0* 30.9* 37.1    160 215     Recent Labs     04/17/21  0123 04/16/21  0350 04/15/21  0704   * 137 133*   K 4.0 4.0 4.6    104 97   CO2 27 29 33*   * 120* 142*   BUN 5* 7 5*   CREA 0.61* 0.54* 0.75   CA 8.4* 8.1* 8.7   ALB  --   --  2.8*   TBILI  --   --  0.6   ALT  --   --  42       Signed: Chente Campbell MD

## 2021-04-18 LAB
ANION GAP SERPL CALC-SCNC: 5 MMOL/L (ref 5–15)
BACTERIA SPEC CULT: NORMAL
BASOPHILS # BLD: 0 K/UL (ref 0–0.1)
BASOPHILS NFR BLD: 0 % (ref 0–1)
BUN SERPL-MCNC: 6 MG/DL (ref 6–20)
BUN/CREAT SERPL: 11 (ref 12–20)
CALCIUM SERPL-MCNC: 8.9 MG/DL (ref 8.5–10.1)
CHLORIDE SERPL-SCNC: 103 MMOL/L (ref 97–108)
CO2 SERPL-SCNC: 27 MMOL/L (ref 21–32)
CREAT SERPL-MCNC: 0.54 MG/DL (ref 0.7–1.3)
DIFFERENTIAL METHOD BLD: ABNORMAL
EOSINOPHIL # BLD: 0.7 K/UL (ref 0–0.4)
EOSINOPHIL NFR BLD: 6 % (ref 0–7)
ERYTHROCYTE [DISTWIDTH] IN BLOOD BY AUTOMATED COUNT: 17.6 % (ref 11.5–14.5)
GLUCOSE SERPL-MCNC: 88 MG/DL (ref 65–100)
HCT VFR BLD AUTO: 33.2 % (ref 36.6–50.3)
HGB BLD-MCNC: 10.2 G/DL (ref 12.1–17)
IMM GRANULOCYTES # BLD AUTO: 0 K/UL (ref 0–0.04)
IMM GRANULOCYTES NFR BLD AUTO: 0 % (ref 0–0.5)
LYMPHOCYTES # BLD: 0.9 K/UL (ref 0.8–3.5)
LYMPHOCYTES NFR BLD: 7 % (ref 12–49)
MCH RBC QN AUTO: 27.7 PG (ref 26–34)
MCHC RBC AUTO-ENTMCNC: 30.7 G/DL (ref 30–36.5)
MCV RBC AUTO: 90.2 FL (ref 80–99)
METAMYELOCYTES NFR BLD MANUAL: 1 %
MONOCYTES # BLD: 1.1 K/UL (ref 0–1)
MONOCYTES NFR BLD: 9 % (ref 5–13)
NEUTS BAND NFR BLD MANUAL: 1 %
NEUTS SEG # BLD: 9.4 K/UL (ref 1.8–8)
NEUTS SEG NFR BLD: 76 % (ref 32–75)
NRBC # BLD: 0 K/UL (ref 0–0.01)
NRBC BLD-RTO: 0 PER 100 WBC
PLATELET # BLD AUTO: 218 K/UL (ref 150–400)
PMV BLD AUTO: 9 FL (ref 8.9–12.9)
POTASSIUM SERPL-SCNC: 4.1 MMOL/L (ref 3.5–5.1)
RBC # BLD AUTO: 3.68 M/UL (ref 4.1–5.7)
RBC MORPH BLD: ABNORMAL
SERVICE CMNT-IMP: NORMAL
SODIUM SERPL-SCNC: 135 MMOL/L (ref 136–145)
WBC # BLD AUTO: 12.2 K/UL (ref 4.1–11.1)

## 2021-04-18 PROCEDURE — 74011250636 HC RX REV CODE- 250/636: Performed by: INTERNAL MEDICINE

## 2021-04-18 PROCEDURE — 85025 COMPLETE CBC W/AUTO DIFF WBC: CPT

## 2021-04-18 PROCEDURE — 74011250637 HC RX REV CODE- 250/637: Performed by: INTERNAL MEDICINE

## 2021-04-18 PROCEDURE — 2709999900 HC NON-CHARGEABLE SUPPLY

## 2021-04-18 PROCEDURE — 36415 COLL VENOUS BLD VENIPUNCTURE: CPT

## 2021-04-18 PROCEDURE — 65660000000 HC RM CCU STEPDOWN

## 2021-04-18 PROCEDURE — 74011000258 HC RX REV CODE- 258: Performed by: INTERNAL MEDICINE

## 2021-04-18 PROCEDURE — 74011000250 HC RX REV CODE- 250: Performed by: INTERNAL MEDICINE

## 2021-04-18 PROCEDURE — 80048 BASIC METABOLIC PNL TOTAL CA: CPT

## 2021-04-18 RX ORDER — ACETAMINOPHEN 325 MG/1
650 TABLET ORAL
Status: DISCONTINUED | OUTPATIENT
Start: 2021-04-18 | End: 2021-04-29 | Stop reason: HOSPADM

## 2021-04-18 RX ADMIN — GABAPENTIN 100 MG: 100 CAPSULE ORAL at 21:43

## 2021-04-18 RX ADMIN — MORPHINE SULFATE 1 MG: 2 INJECTION, SOLUTION INTRAMUSCULAR; INTRAVENOUS at 22:13

## 2021-04-18 RX ADMIN — Medication 10 ML: at 21:43

## 2021-04-18 RX ADMIN — Medication 10 ML: at 06:39

## 2021-04-18 RX ADMIN — CEFEPIME HYDROCHLORIDE 2 G: 2 INJECTION, POWDER, FOR SOLUTION INTRAVENOUS at 04:30

## 2021-04-18 RX ADMIN — LINEZOLID 600 MG: 600 INJECTION, SOLUTION INTRAVENOUS at 15:00

## 2021-04-18 RX ADMIN — LINEZOLID 600 MG: 600 INJECTION, SOLUTION INTRAVENOUS at 03:55

## 2021-04-18 RX ADMIN — OXYCODONE HYDROCHLORIDE AND ACETAMINOPHEN 1 TABLET: 5; 325 TABLET ORAL at 10:33

## 2021-04-18 RX ADMIN — MORPHINE SULFATE 1 MG: 2 INJECTION, SOLUTION INTRAMUSCULAR; INTRAVENOUS at 18:01

## 2021-04-18 RX ADMIN — LEVOTHYROXINE SODIUM 112 MCG: 0.11 TABLET ORAL at 06:39

## 2021-04-18 RX ADMIN — ALLOPURINOL 100 MG: 100 TABLET ORAL at 08:52

## 2021-04-18 RX ADMIN — METRONIDAZOLE 500 MG: 500 INJECTION, SOLUTION INTRAVENOUS at 13:17

## 2021-04-18 RX ADMIN — MORPHINE SULFATE 1 MG: 2 INJECTION, SOLUTION INTRAMUSCULAR; INTRAVENOUS at 04:26

## 2021-04-18 RX ADMIN — GABAPENTIN 100 MG: 100 CAPSULE ORAL at 08:51

## 2021-04-18 RX ADMIN — OXYCODONE HYDROCHLORIDE AND ACETAMINOPHEN 1 TABLET: 5; 325 TABLET ORAL at 02:10

## 2021-04-18 RX ADMIN — CEFEPIME HYDROCHLORIDE 2 G: 2 INJECTION, POWDER, FOR SOLUTION INTRAVENOUS at 20:03

## 2021-04-18 RX ADMIN — CEFEPIME HYDROCHLORIDE 2 G: 2 INJECTION, POWDER, FOR SOLUTION INTRAVENOUS at 12:03

## 2021-04-18 RX ADMIN — OXYCODONE HYDROCHLORIDE AND ACETAMINOPHEN 1 TABLET: 5; 325 TABLET ORAL at 06:39

## 2021-04-18 RX ADMIN — MORPHINE SULFATE 1 MG: 2 INJECTION, SOLUTION INTRAMUSCULAR; INTRAVENOUS at 00:25

## 2021-04-18 RX ADMIN — ACETAMINOPHEN 650 MG: 325 TABLET ORAL at 13:03

## 2021-04-18 RX ADMIN — METRONIDAZOLE 500 MG: 500 INJECTION, SOLUTION INTRAVENOUS at 06:38

## 2021-04-18 RX ADMIN — LIDOCAINE: 40 CREAM TOPICAL at 18:01

## 2021-04-18 RX ADMIN — METRONIDAZOLE 500 MG: 500 INJECTION, SOLUTION INTRAVENOUS at 21:42

## 2021-04-18 RX ADMIN — TAMSULOSIN HYDROCHLORIDE 0.4 MG: 0.4 CAPSULE ORAL at 08:52

## 2021-04-18 RX ADMIN — Medication 10 ML: at 15:00

## 2021-04-18 RX ADMIN — OXYCODONE HYDROCHLORIDE AND ACETAMINOPHEN 1 TABLET: 5; 325 TABLET ORAL at 20:02

## 2021-04-18 RX ADMIN — METHADONE HYDROCHLORIDE 140 MG: 10 CONCENTRATE ORAL at 08:51

## 2021-04-18 RX ADMIN — VARENICLINE TARTRATE 0.5 MG: 0.5 TABLET, FILM COATED ORAL at 08:52

## 2021-04-18 RX ADMIN — GABAPENTIN 100 MG: 100 CAPSULE ORAL at 15:04

## 2021-04-18 NOTE — PROGRESS NOTES
Pharmacy Automatic Renal Dosing Protocol - Antimicrobials    Indication for Antimicrobials: SSTI     Current Regimen of Each Antimicrobial:  Cefepime 2 g IV Q8H (Start Date 4/15; Day # 4)  Metronidazole 500 mg IV Q8H (Start Date 4/15; Day #4)  Linezolid 600 mg PO BID (Start Date 4/15; Day 4)    Previous Antimicrobial Therapy:  Zosyn x1  Vancomycin - pharmacy to dose    Significant Cultures:   4/15: blood - NGTD - prelim  : wound- light enterococcus - prelim   anaerobic: prelim  : fungus: prelim    Radiology / Imaging results: (X-ray, CT scan or MRI):     Paralysis, amputations, malnutrition: None    Labs:  Recent Labs     21  0357 21  0123 21  0350   CREA 0.54* 0.61* 0.54*   BUN 6 5* 7   WBC 12.2* 13.0* 16.9*     Temp (24hrs), Av.4 °F (36.9 °C), Min:98.2 °F (36.8 °C), Max:98.7 °F (37.1 °C)    Is the Patient on Dialysis? No    Creatinine Clearance (mL/min):   CrCl (Actual Body Weight): 181.4  CrCl (Adjusted Body Weight): 152.5  CrCl (Ideal Body Weight): 133.2    Impression/Plan:   Linezolid 600 mg PO BID   Cefepime 2 g IV Q8H per protocol   Metronidazole okay, consider switch interval to q12h once cultures result  Antimicrobial stop date TBD     Pharmacy will follow daily and adjust medications as appropriate for renal function and/or serum levels. Thank you,  ASHLEY Echevarria    Recommended duration of therapy  [email or web address]/[place]/[facility]/Pharmacy/Clinical%20Companion/Duration%20of%[email or web address]. docx    Renal Dosing  [email or web address]/[place]/[facility]/Pharmacy/Clinical%20Companion/Renal%[email or web address]. pdf

## 2021-04-18 NOTE — PROGRESS NOTES
End of Shift Note    Bedside shift change report given to Corrina (oncoming nurse) by Alona Malagon (offgoing nurse). Report included the following information SBAR, Kardex, Intake/Output, MAR, Accordion and Recent Results    Shift worked:  5767-8872     Shift summary and any significant changes:     No significant changes     Concerns for physician to address:  Pain management     Zone phone for oncoming shift:   [phone]       Activity:  Activity Level: Dangle Side of Bed  Number times ambulated in hallways past shift: 0  Number of times OOB to chair past shift: 0    Cardiac:   Cardiac Monitoring: Yes      Cardiac Rhythm: Normal sinus rhythm    Access:   Current line(s): PIV     Genitourinary:   Urinary status: voiding    Respiratory:   O2 Device: None (Room air)  Chronic home O2 use?: NO  Incentive spirometer at bedside: NO     GI:  Last Bowel Movement Date: 04/17/21  Current diet:  DIET REGULAR  Passing flatus: YES  Tolerating current diet: YES       Pain Management:   Patient states pain is manageable on current regimen: NO    Skin:  Dejuan Score: 14  Interventions: float heels    Patient Safety:  Fall Score:  Total Score: 3  Interventions: assistive device (walker, cane, etc)  High Fall Risk: Yes    Length of Stay:  Expected LOS: 3d 12h  Actual LOS: 3      Alona Malagon

## 2021-04-18 NOTE — PROGRESS NOTES
Hospitalist Progress Note    NAME: Marybel Heath   :  1965   MRN:  [id number]       Assessment / Plan:    Sepsis POA: Temperature 101.4 at rate 108 WBC 25k  Infected extensive chronic  left lower extremity setting of pyoderma gangrenosum  Leukocytosis improving  Continue current antibiotics including Zyvox, Flagyl and cefepime  Patient is listed allergy to vancomycin  Wound culture is growing Enterococcus species and gram-negative rods  Appreciated orthopedic, vascular surgery in general surgery consult  Recommended amputation but initially refused  He wants to continue the current treatment and likely biopsy  Will talk to the surgical team about obtaining a biopsy   His biopsy in 2019 showed likely pyoderma gangrenosum  Continue methadone and Percocet for pain control  Use as needed IV morphine for breakthrough pain       History of traumatic subarachnoid hemorrhage in 2020  Avoid NSAIDs and blood thinners  Will place SCDs for DVT prophylaxis  History of gout and osteoarthritis of both knees   Per chart review, patient was supposed to get knee replacement        # Chronic methadone use: Continue home dose methadone   # Hypothyroidism: Continue Synthroid  # HTN: Hold BP meds  # Left sided weakness w/ contracture. Chronic            Code Status: Full code  Surrogate Decision Maker:  Regan Syd Other Relative [phone]      DVT Prophylaxis: SCDs  GI Prophylaxis: not indicated     Baseline: Ambulatory      Discontinue IV fluids today     Subjective:     Chief Complaint / Reason for Physician Visit  \" Continues to report pain in the left lower extremity. no more fever noticed. Pain is appropriately controlled with current pain medication\". Discussed with RN events overnight.      Review of Systems:  Symptom Y/N Comments  Symptom Y/N Comments   Fever/Chills n   Chest Pain n    Poor Appetite n   Edema n    Cough n   Abdominal Pain n    Sputum n   Joint Pain     SOB/BECK n Pruritis/Rash     Nausea/vomit n   Tolerating PT/OT     Diarrhea n   Tolerating Diet     Constipation n   Other       Could NOT obtain due to:      Objective:     VITALS:   Last 24hrs VS reviewed since prior progress note. Most recent are:  Patient Vitals for the past 24 hrs:   Temp Pulse Resp BP SpO2   04/18/21 1129 98.5 °F (36.9 °C) 77 18 101/60 93 %   04/18/21 0816 98.2 °F (36.8 °C) 72 18 111/64 95 %   04/18/21 0222 98.2 °F (36.8 °C) 74 18 (!) 158/73 93 %   04/17/21 2243 98.2 °F (36.8 °C) 84 19 128/72 92 %   04/17/21 1942 98.4 °F (36.9 °C) 74 16 125/73 95 %   04/17/21 1441 98.6 °F (37 °C) 77 15 116/69 94 %       Intake/Output Summary (Last 24 hours) at 4/18/2021 1151  Last data filed at 4/18/2021 0902  Gross per 24 hour   Intake 1350 ml   Output 2625 ml   Net -1275 ml      Face-to-face encounter was provided on April 18, 2021 with the following findings  PHYSICAL EXAM:  General: WD, WN. Alert, cooperative, no acute distress    EENT:  EOMI. Anicteric sclerae. MMM  Resp:  CTA bilaterally, no wheezing or rales. No accessory muscle use  CV:  Regular  rhythm,  No edema  GI:  Soft, Non distended, Non tender.  +Bowel sounds  Neurologic:  Alert and oriented X 3, normal speech,   Psych:   Good insight. Not anxious nor agitated  Skin:  Necrosis and gangrenous looking left lower extremity ulceration. Reviewed most current lab test results and cultures  YES  Reviewed most current radiology test results   YES  Review and summation of old records today    NO  Reviewed patient's current orders and MAR    YES  PMH/SH reviewed - no change compared to H&P  ________________________________________________________________________  Care Plan discussed with:    Comments   Patient x    Family      RN x    Care Manager     Consultant                        Multidiciplinary team rounds were held today with , nursing, pharmacist and clinical coordinator.   Patient's plan of care was discussed; medications were reviewed and discharge planning was addressed. ________________________________________________________________________  Total NON critical care TIME:  35  Minutes    Total CRITICAL CARE TIME Spent:   Minutes non procedure based      Comments   >50% of visit spent in counseling and coordination of care     ________________________________________________________________________  Khalida Amaya MD     Procedures: see electronic medical records for all procedures/Xrays and details which were not copied into this note but were reviewed prior to creation of Plan. LABS:  I reviewed today's most current labs and imaging studies.   Pertinent labs include:  Recent Labs     04/18/21  0357 04/17/21  0123 04/16/21  0350   WBC 12.2* 13.0* 16.9*   HGB 10.2* 9.9* 9.9*   HCT 33.2* 31.0* 30.9*    179 160     Recent Labs     04/18/21  0357 04/17/21  0123 04/16/21  0350   * 135* 137   K 4.1 4.0 4.0    103 104   CO2 27 27 29   GLU 88 114* 120*   BUN 6 5* 7   CREA 0.54* 0.61* 0.54*   CA 8.9 8.4* 8.1*       Signed: Khalida Amaya MD

## 2021-04-18 NOTE — PROGRESS NOTES
Attempted follow up visit in [facility] for ongoing pastoral support. Patient indicated he is experiencing pain right now and would appreciate a visit at a later time.  will follow up later today or tomorrow. Plan of care will include making a referral to music theraphy.      DYLAN Rea, [facility], Staff [address]    [address] Paging Service  [phone])

## 2021-04-18 NOTE — PROGRESS NOTES
End of Shift Note    Bedside shift change report given to [place] (oncoming nurse) by Janice Mayer (offgoing nurse).   Report included the following information SBAR, Kardex, ED Summary, Procedure Summary, Intake/Output, MAR and Recent Results    Shift worked:  7-7pm     Shift summary and any significant changes:     No significant changes     Refusing MRI for now   Concerns for physician to address:       Zone phone for oncoming shift:            Janice Mayer

## 2021-04-19 ENCOUNTER — APPOINTMENT (OUTPATIENT)
Dept: CT IMAGING | Age: 56
DRG: 720 | End: 2021-04-19
Attending: INTERNAL MEDICINE
Payer: COMMERCIAL

## 2021-04-19 ENCOUNTER — APPOINTMENT (OUTPATIENT)
Dept: MRI IMAGING | Age: 56
DRG: 720 | End: 2021-04-19
Attending: INTERNAL MEDICINE
Payer: COMMERCIAL

## 2021-04-19 LAB
ANION GAP SERPL CALC-SCNC: 4 MMOL/L (ref 5–15)
BASOPHILS # BLD: 0.1 K/UL (ref 0–0.1)
BASOPHILS NFR BLD: 0 % (ref 0–1)
BUN SERPL-MCNC: 7 MG/DL (ref 6–20)
BUN/CREAT SERPL: 11 (ref 12–20)
CALCIUM SERPL-MCNC: 8.5 MG/DL (ref 8.5–10.1)
CHLORIDE SERPL-SCNC: 104 MMOL/L (ref 97–108)
CO2 SERPL-SCNC: 28 MMOL/L (ref 21–32)
CREAT SERPL-MCNC: 0.61 MG/DL (ref 0.7–1.3)
DIFFERENTIAL METHOD BLD: ABNORMAL
EOSINOPHIL # BLD: 0.8 K/UL (ref 0–0.4)
EOSINOPHIL NFR BLD: 7 % (ref 0–7)
ERYTHROCYTE [DISTWIDTH] IN BLOOD BY AUTOMATED COUNT: 17.2 % (ref 11.5–14.5)
GLUCOSE SERPL-MCNC: 118 MG/DL (ref 65–100)
HCT VFR BLD AUTO: 32.4 % (ref 36.6–50.3)
HGB BLD-MCNC: 10.3 G/DL (ref 12.1–17)
IMM GRANULOCYTES # BLD AUTO: 0.2 K/UL (ref 0–0.04)
IMM GRANULOCYTES NFR BLD AUTO: 2 % (ref 0–0.5)
LYMPHOCYTES # BLD: 1.2 K/UL (ref 0.8–3.5)
LYMPHOCYTES NFR BLD: 11 % (ref 12–49)
MCH RBC QN AUTO: 28.3 PG (ref 26–34)
MCHC RBC AUTO-ENTMCNC: 31.8 G/DL (ref 30–36.5)
MCV RBC AUTO: 89 FL (ref 80–99)
MONOCYTES # BLD: 1.3 K/UL (ref 0–1)
MONOCYTES NFR BLD: 11 % (ref 5–13)
NEUTS SEG # BLD: 7.6 K/UL (ref 1.8–8)
NEUTS SEG NFR BLD: 69 % (ref 32–75)
NRBC # BLD: 0 K/UL (ref 0–0.01)
NRBC BLD-RTO: 0 PER 100 WBC
PLATELET # BLD AUTO: 203 K/UL (ref 150–400)
PMV BLD AUTO: 8.9 FL (ref 8.9–12.9)
POTASSIUM SERPL-SCNC: 3.7 MMOL/L (ref 3.5–5.1)
RBC # BLD AUTO: 3.64 M/UL (ref 4.1–5.7)
SODIUM SERPL-SCNC: 136 MMOL/L (ref 136–145)
WBC # BLD AUTO: 11.2 K/UL (ref 4.1–11.1)

## 2021-04-19 PROCEDURE — 99223 1ST HOSP IP/OBS HIGH 75: CPT | Performed by: SURGERY

## 2021-04-19 PROCEDURE — 65660000000 HC RM CCU STEPDOWN

## 2021-04-19 PROCEDURE — 74011250637 HC RX REV CODE- 250/637: Performed by: INTERNAL MEDICINE

## 2021-04-19 PROCEDURE — A9575 INJ GADOTERATE MEGLUMI 0.1ML: HCPCS | Performed by: INTERNAL MEDICINE

## 2021-04-19 PROCEDURE — 2709999900 HC NON-CHARGEABLE SUPPLY

## 2021-04-19 PROCEDURE — 73720 MRI LWR EXTREMITY W/O&W/DYE: CPT

## 2021-04-19 PROCEDURE — 74011000636 HC RX REV CODE- 636: Performed by: INTERNAL MEDICINE

## 2021-04-19 PROCEDURE — 80048 BASIC METABOLIC PNL TOTAL CA: CPT

## 2021-04-19 PROCEDURE — 74011000258 HC RX REV CODE- 258: Performed by: INTERNAL MEDICINE

## 2021-04-19 PROCEDURE — 85025 COMPLETE CBC W/AUTO DIFF WBC: CPT

## 2021-04-19 PROCEDURE — 74011000250 HC RX REV CODE- 250: Performed by: INTERNAL MEDICINE

## 2021-04-19 PROCEDURE — 36415 COLL VENOUS BLD VENIPUNCTURE: CPT

## 2021-04-19 PROCEDURE — 74011250636 HC RX REV CODE- 250/636: Performed by: NURSE PRACTITIONER

## 2021-04-19 PROCEDURE — 74011250636 HC RX REV CODE- 250/636: Performed by: INTERNAL MEDICINE

## 2021-04-19 PROCEDURE — 73701 CT LOWER EXTREMITY W/DYE: CPT

## 2021-04-19 RX ORDER — GADOTERATE MEGLUMINE 376.9 MG/ML
20 INJECTION INTRAVENOUS
Status: COMPLETED | OUTPATIENT
Start: 2021-04-19 | End: 2021-04-19

## 2021-04-19 RX ADMIN — ONDANSETRON 4 MG: 2 INJECTION INTRAMUSCULAR; INTRAVENOUS at 08:48

## 2021-04-19 RX ADMIN — MORPHINE SULFATE 1 MG: 2 INJECTION, SOLUTION INTRAMUSCULAR; INTRAVENOUS at 03:58

## 2021-04-19 RX ADMIN — OXYCODONE HYDROCHLORIDE AND ACETAMINOPHEN 1 TABLET: 5; 325 TABLET ORAL at 01:16

## 2021-04-19 RX ADMIN — METRONIDAZOLE 500 MG: 500 INJECTION, SOLUTION INTRAVENOUS at 06:34

## 2021-04-19 RX ADMIN — GABAPENTIN 100 MG: 100 CAPSULE ORAL at 15:36

## 2021-04-19 RX ADMIN — MORPHINE SULFATE 1 MG: 2 INJECTION, SOLUTION INTRAMUSCULAR; INTRAVENOUS at 08:47

## 2021-04-19 RX ADMIN — GADOTERATE MEGLUMINE 15 ML: 376.9 INJECTION INTRAVENOUS at 19:52

## 2021-04-19 RX ADMIN — CEFEPIME HYDROCHLORIDE 2 G: 2 INJECTION, POWDER, FOR SOLUTION INTRAVENOUS at 04:05

## 2021-04-19 RX ADMIN — LEVOTHYROXINE SODIUM 112 MCG: 0.11 TABLET ORAL at 06:34

## 2021-04-19 RX ADMIN — CEFEPIME HYDROCHLORIDE 2 G: 2 INJECTION, POWDER, FOR SOLUTION INTRAVENOUS at 12:43

## 2021-04-19 RX ADMIN — OXYCODONE HYDROCHLORIDE AND ACETAMINOPHEN 1 TABLET: 5; 325 TABLET ORAL at 18:06

## 2021-04-19 RX ADMIN — LINEZOLID 600 MG: 600 INJECTION, SOLUTION INTRAVENOUS at 17:05

## 2021-04-19 RX ADMIN — VARENICLINE TARTRATE 0.5 MG: 0.5 TABLET, FILM COATED ORAL at 17:17

## 2021-04-19 RX ADMIN — MEROPENEM 500 MG: 500 INJECTION, POWDER, FOR SOLUTION INTRAVENOUS at 17:17

## 2021-04-19 RX ADMIN — LINEZOLID 600 MG: 600 INJECTION, SOLUTION INTRAVENOUS at 04:01

## 2021-04-19 RX ADMIN — LIDOCAINE: 40 CREAM TOPICAL at 12:42

## 2021-04-19 RX ADMIN — GABAPENTIN 100 MG: 100 CAPSULE ORAL at 22:33

## 2021-04-19 RX ADMIN — MORPHINE SULFATE 1 MG: 2 INJECTION, SOLUTION INTRAMUSCULAR; INTRAVENOUS at 15:36

## 2021-04-19 RX ADMIN — METHADONE HYDROCHLORIDE 140 MG: 10 CONCENTRATE ORAL at 08:51

## 2021-04-19 RX ADMIN — LORAZEPAM 1 MG: 2 INJECTION INTRAMUSCULAR; INTRAVENOUS at 18:05

## 2021-04-19 RX ADMIN — VARENICLINE TARTRATE 0.5 MG: 0.5 TABLET, FILM COATED ORAL at 08:52

## 2021-04-19 RX ADMIN — OXYCODONE HYDROCHLORIDE AND ACETAMINOPHEN 1 TABLET: 5; 325 TABLET ORAL at 11:48

## 2021-04-19 RX ADMIN — POLYETHYLENE GLYCOL 3350 17 G: 17 POWDER, FOR SOLUTION ORAL at 08:51

## 2021-04-19 RX ADMIN — GABAPENTIN 100 MG: 100 CAPSULE ORAL at 08:52

## 2021-04-19 RX ADMIN — ALLOPURINOL 100 MG: 100 TABLET ORAL at 08:52

## 2021-04-19 RX ADMIN — MEROPENEM 500 MG: 500 INJECTION, POWDER, FOR SOLUTION INTRAVENOUS at 13:52

## 2021-04-19 RX ADMIN — OXYCODONE HYDROCHLORIDE AND ACETAMINOPHEN 1 TABLET: 5; 325 TABLET ORAL at 06:34

## 2021-04-19 RX ADMIN — IOPAMIDOL 100 ML: 755 INJECTION, SOLUTION INTRAVENOUS at 20:18

## 2021-04-19 RX ADMIN — MORPHINE SULFATE 1 MG: 2 INJECTION, SOLUTION INTRAMUSCULAR; INTRAVENOUS at 21:12

## 2021-04-19 RX ADMIN — OXYCODONE HYDROCHLORIDE AND ACETAMINOPHEN 1 TABLET: 5; 325 TABLET ORAL at 22:33

## 2021-04-19 RX ADMIN — TAMSULOSIN HYDROCHLORIDE 0.4 MG: 0.4 CAPSULE ORAL at 08:52

## 2021-04-19 NOTE — PROGRESS NOTES
End of Shift Note    Bedside shift change report given to Dandre Esposito RN(oncoming nurse) by Maryann Smith (offgoing nurse). Report included the following information SBAR, Kardex, Intake/Output, MAR and Recent Results    Shift worked:  9814-0095     Shift summary and any significant changes:     Pt req round the clock pain med for pain control of left foot. Concerns for physician to address:       Zone phone for oncoming shift:          Activity:  Activity Level: Dangle Side of Bed  Number times ambulated in hallways past shift: 0  Number of times OOB to chair past shift: 0    Cardiac:   Cardiac Monitoring: Yes      Cardiac Rhythm: Normal sinus rhythm    Access:   Current line(s): PIV     Genitourinary:   Urinary status: voiding    Respiratory:   O2 Device: None (Room air)  Chronic home O2 use?: NO  Incentive spirometer at bedside: NO     GI:  Last Bowel Movement Date: 04/17/21  Current diet:  DIET REGULAR  Passing flatus: Tolerating current diet: YES       Pain Management:   Patient states pain is manageable on current regimen: YES    Skin:  Dejuan Score: 14  Interventions: float heels, increase time out of bed and nutritional support     Patient Safety:  Fall Score:  Total Score: 3  Interventions: gripper socks, pt to call before getting OOB and stay with me (per policy)  High Fall Risk: Yes    Length of Stay:  Expected LOS: 3d 12h  Actual LOS: [facility]

## 2021-04-19 NOTE — PROGRESS NOTES
Music Therapy Assessment  [address]    Marilyn Franco [id number]     1965  63 y.o.  male    Patient Telephone Number: [phone] (home)   Zoroastrianism Affiliation: Maria Esther Holland   Language: English   Patient Active Problem List    Diagnosis Date Noted    Infected wound 04/15/2021    Sepsis ([address] 04/15/2021    Traumatic subarachnoid hemorrhage ([address] 12/10/2020    Traumatic subarachnoid hemorrhage without loss of consciousness ([address] 12/10/2020    Pyoderma gangrenosum 10/30/2020    Open wound, lower leg 05/30/2019    Depression 05/20/2019    Foot infection 04/23/2019    Nonadherence to medical treatment 10/12/2018    Sinus bradycardia 10/12/2018    Gout 10/12/2018    Hypothyroidism 10/12/2018    Foot ulcer ([address] 09/28/2018    Chronic obstructive pulmonary disease ([address] 08/08/2018    Chronic pain disorder 08/08/2018    Hypokalemia 08/08/2018    Seizure disorder ([address] 08/08/2018    Tobacco abuse 08/08/2018    Major depression 78/64/3101    Uncomplicated opioid dependence ([address] 06/06/2018    Left foot infection 03/13/2018    Malignant neoplasm of urinary bladder ([address] 03/06/2018    FH: bladder cancer 03/06/2018    Long term current use of methadone for pain control 12/27/2017    Major depressive disorder with current active episode 12/27/2017    Physical debility 12/27/2017    HTN (hypertension) 12/21/2017    Cellulitis of left lower extremity 12/21/2017    Drug overdose 10/09/2016    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    Brain aneurysm 12/30/2013    Chronic pain 12/30/2013    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/2013    Spasticity 12/30/2013    Cerebral infarction ([address] 12/12/2012    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    Cerebral hemorrhage with hemiparesis ([address] 04/15/2011    Central pain syndrome 04/15/2011    Stroke ([address] 03/11/2011    Hypertension 03/11/2011    Thromboembolism ([address] 03/11/2011        Date: 4/19/2021            Total Time (in minutes): 17 MRM 3 MED TELE    Mental Status:   [x] Alert [  ] Ajith Saliva [  ]  Confused  [  ] Minimally responsive  [  ] Sleeping    Communication Status: [  ] Impaired Speech [  ] Nonverbal -N/A    Physical Status:   [  ] Oxygen in use  [  ] Hard of Hearing [  ] Vision Impaired  [  ] Ambulatory  [  ] Ambulatory with assistance [  ] Non-ambulatory -N/A    Music Preferences, Background: Country and [address] [facility] [address], [address]. Clinical Problem to be addressed: Alleviate pain, emotional support. Goal(s) met in session:  Physical/Pain management (Scale of 1-10):    Pre-session ratin  Post-session rating: N/A: Please see Session Observations below. [  ] Increased relaxation   [  ] Affected breathing patterns  [  ] Decreased muscle tension   [  ] Decreased agitation  [  ] Affected heart rate    [  ] Increased alertness     Emotional/Psychological:  [  ] Increased self-expression   [  ] Decreased aggressive behavior   [  ] Decreased feelings of stress  [  ] Discussed healthy coping skills     [  ] Improved mood    [  ] Decreased withdrawn behavior     Social:  [  ] Decreased feelings of isolation/loneliness [x] Positive social interaction   [  ] Provided support and/or comfort for family/friends    Spiritual:  [  ] Spiritual support    [  ] Expressed peace  [  ] Expressed kendy    [  ] Discussed beliefs    Techniques Utilized (Check all that apply):   [  ] Procedural support MT [  ] Music for relaxation [  ] Patient preferred music  [  ] Kelsey analysis  [  ] Song choice  [  ] Music for validation  [  ] Entrainment  [  ] Movement to music [  ] Guided visualization  [  ] Creasie Lucia  [  ] Patient instrument playing [  ] GeneAssess writing  [  ] C[employer] Arts along   [  ] Lucianda Don  [  ] Sensory stimulation  [x] Active Listening  [  ] Music for spiritual support [  ] Making of CDs as gifts    Session Observations:  Referral from Lois Yepez. Patient (pt) was alert lying in bed.  This music therapist introduced self and asked the pt how he was feeling. Pt said that he'd rate his current pain at a 9, so this wasn't a time for music. MT expressed sympathy and understanding, and gently shared that the pt's pain is actually the reason MT came to offer him music therapy. MT briefly shared about how music therapy can help with alleviating pain. MT asked the pt about his music preferences and pt shared these. He declined having music therapy right now, and said he would be open to trying this later in the day. MT explained that the MT is only at the hospital one day a week, and so may not be able to return later today due to responding to other patient consults throughout the day. Pt expressed understanding and said if the MT is able to return, he hopes it would be a better time. He also engaged in sharing about his two daughters and his grandchild with MT, and his affect brightened as he spoke about them. Pt thanked MT for the visit.     NANCI GuzmánBC (Music Therapist-Board Certified)  Spiritual Care Department  Referral-based service

## 2021-04-19 NOTE — CONSULTS
Surgery Consult    Subjective:      Sabra Flowers is a 63 y.o. male who I am consulted on by Dr Carola Cabello for chronic left lower leg wounds. He initially started treatments for left foot wound in the spring of 2019. Bx at that time was not definitive for any dx. There was possibility of pyoderma granulosa but it was not confirmed at the time. He has been having local wound care intermittently but continues to have wound compression. Amputation was recommended at multiple facilities over the last few years but he has refused. He was admitted with fever, leukocytosis and cellulitis. He has wounds down to exposed tendons on the lower leg anteriorly and posteriorly and the foot. He has pulses. He had a CVA and has left hemiparesis. Past Medical History:   Diagnosis Date    Aneurysm [facility])     (with stroke)    Bladder tumor     Cancer ([address]     bladder CA    Chronic pain     Family history of bladder cancer     GERD (gastroesophageal reflux disease)     Gout     History of vascular access device 04/25/2019    [facility] VAT 4 FR MIDLINE R Cephalic Limited access    History of vascular access device 04/26/2019    4 fr Midline right Cephalic midline access    Hypercholesterolemia     Hypertension     Lesion of bladder     Seizures ([address]     Stroke ([address] 2006    left sided weakness    Thromboembolus ([address]     left leg    Thyroid disease     TIA (transient ischemic attack) 2012     Past Surgical History:   Procedure Laterality Date    HX ORTHOPAEDIC      R arthroscopy    HX OTHER SURGICAL      x2 L foot    HX UROLOGICAL  04/20/2018    Cystoscopy, TURBT (greater than 5 cm resected) Dr. Jenna Ramos, [facility].     KNEE ARTHROSCP HARV      left knee    TRANSURETHRAL RESEC BLADDER NECK  08/10/2018      Family History   Problem Relation Age of Onset    Diabetes Father     Lung Disease Father     Diabetes Sister     Diabetes Brother     Cancer Paternal Grandfather         Bladder     Social History Socioeconomic History    Marital status:      Spouse name: Not on file    Number of children: Not on file    Years of education: Not on file    Highest education level: Not on file   Social Needs    Financial resource strain: Not very hard    Food insecurity     Worry: Never true     Inability: Never true    Transportation needs     Medical: No     Non-medical: No   Tobacco Use    Smoking status: Current Every Day Smoker     Packs/day: 0.50    Smokeless tobacco: Never Used    Tobacco comment: instructed not to smoke 24 hrs prior surgery   Substance and Sexual Activity    Alcohol use: Yes     Alcohol/week: 6.0 standard drinks     Types: 6 Cans of beer per week     Comment: occasional    Drug use: Yes     Types: Prescription    Sexual activity: Yes   Lifestyle    Physical activity     Days per week: 7 days     Minutes per session: 30 min    Stress: Not at all   Relationships    Social connections     Talks on phone: Not on file     Gets together: Three times a week     Attends Sikh service: Patient refused     Active member of club or organization: Patient refused     Attends meetings of clubs or organizations: Never     Relationship status:    Other Topics Concern     Service No    Blood Transfusions No     Comment: refused to make decision    Caffeine Concern No    Occupational Exposure No    Hobby Hazards No    Sleep Concern No    Stress Concern No    Weight Concern No    Special Diet No    Back Care No    Exercise No    Bike Helmet No    Seat Belt Yes    Self-Exams No   Social History Narrative    60year old  male admitted for reported SI in the context of bladder CA, chronic pain, homelessness, and opiod dependence.  Pt has been in multiple psychiatric admissions for the same compliants in the last 2 years,      Current Facility-Administered Medications   Medication Dose Route Frequency Provider Last Rate Last Admin    acetaminophen (TYLENOL) tablet 650 mg  650 mg Oral Q6H PRN Frankie EDOUARD MD   650 mg at 04/18/21 1303    ondansetron (ZOFRAN) injection 4 mg  4 mg IntraVENous Q6H PRN Larry Long MD   4 mg at 04/19/21 0848    polyethylene glycol (MIRALAX) packet 17 g  17 g Oral DAILY Larry Long MD   17 g at 04/19/21 0851    LORazepam (ATIVAN) injection 1 mg  1 mg IntraVENous ON Jennifer Lopez, NP        morphine injection 1 mg  1 mg IntraVENous Q4H PRN Larry Long MD   1 mg at 04/19/21 0847    levothyroxine (SYNTHROID) tablet 112 mcg  112 mcg Oral ACB Larry Long MD   112 mcg at 04/19/21 0634    metroNIDAZOLE (FLAGYL) IVPB premix 500 mg  500 mg IntraVENous Q8H Larry Long  mL/hr at 04/19/21 0634 500 mg at 04/19/21 0634    linezolid in dextrose 5% (ZYVOX) IVPB premix in D5W 600 mg  600 mg IntraVENous Q12H Lawrence Cadena MD   600 mg at 04/19/21 0401    cefepime (MAXIPIME) 2 g in 0.9% sodium chloride (MBP/ADV) 100 mL MBP  2 g IntraVENous Q8H Larry Long  mL/hr at 04/19/21 0405 2 g at 04/19/21 0405    lidocaine (XYLOCAINE) 4 % cream   Topical DAILY Larry Long MD   Given at 04/18/21 1801    sodium chloride (NS) flush 5-10 mL  5-10 mL IntraVENous PRN Sanjuana Vargas MD   10 mL at 04/18/21 2143    allopurinoL (ZYLOPRIM) tablet 100 mg  100 mg Oral DAILY Sanjuana Vargas MD   100 mg at 04/19/21 0464    gabapentin (NEURONTIN) capsule 100 mg  100 mg Oral TID Sanjuana Vargas MD   100 mg at 04/19/21 0852    melatonin tablet 3 mg  3 mg Oral QHS PRN Sanjuana Vargas MD        [facility] capsule 0.4 mg  0.4 mg Oral DAILY Sanjuana Vargas MD   0.4 mg at 04/19/21 0852    methadone (DOLOPHINE) 10 mg/mL concentrated solution 140 mg  140 mg Oral DAILY Sanjuana Vargas MD   140 mg at 04/19/21 0851    varenicline (CHANTIX) 0.5 mg tablet 0.5 mg  0.5 mg Oral BIDPC Sanjuana Vargas MD   0.5 mg at 04/19/21 7999    Followed by   Cyril Ha ON 4/23/2021] varenicline (CHANTIX) 0.5 mg tablet 1 mg  1 mg Oral BIDPC Keira Berrios MD        oxyCODONE-acetaminophen (PERCOCET) 5-325 mg per tablet 1 Tab  1 Tab Oral Q4H PRN Keira Berrios MD   1 Tab at 21 1047        Allergies   Allergen Reactions    Sulfamethoxazole-Trimethoprim Rash     L eye and L hand    Ciprofloxacin Hives     Per pcp records    Codeine Hives     Tolerates dilaudid, oxycodone    Cymbalta [Duloxetine] Other (comments)     Confusion and memory loss    Lyrica [Pregabalin] Hives    Sulfa (Sulfonamide Antibiotics) Rash    Ultram [Tramadol] Hives    Vancomycin Shortness of Breath    Zosyn [Piperacillin-Tazobactam] Rash       Review of Systems:  Constitutional: positive for chills and fatigue  Musculoskeletal:positive for myalgias, stiff joints and bone pain  Neurological: positive for coordination problems, gait problems and weakness  Behavioral/Psychiatric: positive for non compliance, obesity and tobacco use    Objective:        Patient Vitals for the past 8 hrs:   BP Temp Pulse Resp SpO2   21 1043 (!) 120/58 98.2 °F (36.8 °C) 75 19 92 %   21 0754 139/74 98.5 °F (36.9 °C) 73 18 93 %       Temp (24hrs), Av.3 °F (36.8 °C), Min:97.9 °F (36.6 °C), Max:98.5 °F (36.9 °C)      Physical Exam:  General:  Alert, cooperative, no distress, appears stated age. Eyes:  Conjunctivae/corneas clear. anicteric           Mouth/Throat: Lips, mucosa, and tongue normal.    Neck: Supple, symmetrical, trachea midline, no adenopathy, thyroid: no enlargment/tenderness/nodules, no carotid bruit and no JVD. Back:   Symmetric, no curvature. ROM normal. No CVA tenderness. Lungs:   Clear to auscultation bilaterally. Heart:  Regular rate and rhythm, S1, S2 normal, no murmur, click, rub or gallop. Abdomen:   Soft, non-tender. Bowel sounds normal. No masses,  No organomegaly.    Extremities: Left upper and lower paresis, large open wounds on LLE   Pulses: intact   Skin: Left lower leg wound with exposed tendon, muscle, extensive loss of skin Lymph nodes: Cervical, supraclavicular, and axillary nodes normal.   Neurologic: CNII-XII intact. LLE and LUE weakness       Assessment:     Hospital Problems  Date Reviewed: 9/9/2020          Codes Class Noted POA    Infected wound ICD-10-CM: T14. 8XXA, L08.9  ICD-9-CM: 958.3  4/15/2021 Unknown        Sepsis ([facility] [address] ICD-10-CM: A41.9  ICD-9-CM: 038.9, 995.91  4/15/2021 Unknown          this is a chronic non healing wound for 2 years. It is unclear of the etiology, possibly pyoderma gangrenosa but not well documented. He also has secondary multibacterial cellulitis and infection. This wound will not heal and he will not have a functional limb even if it did. For quality of life and infection control he would be best suited with an amputation. He is reluctant to do so. I am asked to consider biopsy but it is not likely to reveal anything that would . He would still need an amputation.     Hx CVA with ongoing left hemeparesis    Plan:     Continue abx  Favor amputation (which I do not do)    I will be available again as needed    Tyron Smith MD FACS

## 2021-04-19 NOTE — ADT AUTH CERT NOTES
Foot: Surgical Wound Care - Care Day 5 (4/19/2021) by Sachin Ramsay       Review Status Review Entered   Completed 4/19/2021 13:25      Criteria Review      Care Day: 5 Care Date: 4/19/2021 Level of Care: Telemetry    Guideline Day 2    Level Of Care    (X) Floor    Clinical Status    ( ) * Procedure completed    4/19/2021 13:25:30 EDT by Bryan Borges REFUSED    (X) * Hypotension absent    4/19/2021 13:25:30 EDT by Sachin Ramsay      /74, P 73    (X) * Mental status at baseline    (X) * Afebrile or fever improved    4/19/2021 13:25:30 EDT by Bryan Borges 98.5    (X) * Acidosis absent    (X) * Blood glucose under acceptable control    4/19/2021 13:25:31 EDT by Sachin Ramsay      GLUC 118    (X) * Dehydration absent    (X) * Electrolyte abnormalities absent or improved    4/19/2021 13:25:31 EDT by Sachin Ramsay      , CA 8.5    Activity    ( ) * Assisted ambulation    Routes    (X) * Advance diet    4/19/2021 13:25:31 EDT by Sachin Ramsay      REG DIET    (X) IV or oral hydration, medications    4/19/2021 13:25:31 EDT by Sachin Ramsay      ZOFRAN 4 MG IV Q 6 HR PRN X1, MIRALAX 17G PO QD, CHANTIX 0.5MG PO BID,  OTHER SCHED MEDS ARE SAME    Interventions    (X) WBC    4/19/2021 13:25:31 EDT by Sachin Ramsay      WBC 11.2, RBC 3.64, HGB 10.3,  HCT 32.4, GLUC 118, CREAT 0.61,    (X) Serum glucose, electrolytes    (X) Wound management    Medications    (X) * PCA absent [Q]    (X) Possible antibiotics [R]    4/19/2021 13:25:31 EDT by Sachin Ramsay      MEROPENEM 1G IV Q 8 HRS, ZYVOX 600 MG IV Q 12 HR, MAXIPIME IV DC'D, FLAGYL IV DC'D,    (X) Possible analgesics    4/19/2021 13:25:31 EDT by Sachin Ramsay      MORPHINE 1MG IV Q 8 HR PRN X2, PERCOCET 1 TAB PO Q 4 HR PRN X 3,    * Milestone   Additional Notes   Hospitalist Progress Note          Continues to report pain in the left lower extremity.   no more fever noticed.  Pain is appropriately controlled with current pain medication\".  Discussed with RN events overnight. EXAM:  General:          WD, WN. Alert, cooperative, no acute distress     EENT:              EOMI. Anicteric sclerae. MMM   Resp:               CTA bilaterally, no wheezing or rales.  No accessory muscle use   CV:                  Regular  rhythm,  No edema   GI:                   Soft, Non distended, Non tender.  +Bowel sounds   Neurologic:      Alert and oriented X 3, normal speech,    Psych:             Good insight. Not anxious nor agitated   Skin:                Necrosis and gangrenous looking left lower extremity ulceration.          Assessment / Plan:       Sepsis POA: Temperature 101.4 at rate 108 WBC 25k   Infected extensive chronic  left lower extremity setting of pyoderma gangrenosum   Leukocytosis improving    current antibiotics including Zyvox, Flagyl and cefepime   Patient is listed allergy to vancomycin   Wound cultures   Culture result: Abnormal Preliminary   LIGHT ENTEROCOCCUS FAECALIS    Culture result: Abnormal Preliminary   LIGHT ESCHERICHIA COLI ** (EXTENDED SPECTRUM BETA LACTAMASE ) **    Culture result: Abnormal Preliminary   LIGHT PSEUDOMONAS AERUGINOSA PIP/JUANITO SENSITIVITY TO FOLLOW    Culture result: Abnormal Preliminary   LIGHT ALCALIGENES FAECALIS                Transition antibiotics to Zyvox and meropenem based on the sensitivities   Appreciated orthopedic, vascular surgery in general surgery consult   Recommended amputation but patient refused   He wants to continue the current treatment and likely biopsy   Spoke to the surgical team about biopsy and recommended that biopsy is a futile effort as his limb is not salvageable    His biopsy in 2019 showed likely pyoderma gangrenosum   Continue methadone and Percocet for pain control   Use as needed IV morphine for breakthrough pain   We will obtain ethics consult as this patient has 2 years of struggling with this lower extremity wounds and ulcers and have been recommended amputation by multiple providers but he continues to refuse. He just wants antibiotics which are futile at this point due to severe necrosis of his lower extremity           History of traumatic subarachnoid hemorrhage in December 2020   Avoid NSAIDs and blood thinners   Continue SCDs for DVT prophylaxis   History of gout and osteoarthritis of both knees    Per chart review, patient was supposed to get knee replacement           # Chronic methadone use: Continue home dose methadone    # Hypothyroidism: Continue Synthroid   # HTN: Hold BP meds   # Left sided weakness w/ contracture. Chronic          CVM NOTE: CM reviewed chart. Pt is not medically stable for d/c at this time. CM will continue to follow for discharge planning while patient is admitted on current unit.  Please contact this CM with questions or issues related to discharge.          Foot: Surgical Wound Care - Care Day 4 (4/18/2021) by Chelsi Roman       Review Status Review Entered   Completed 4/19/2021 13:15      Criteria Review      Care Day: 4 Care Date: 4/18/2021 Level of Care: Telemetry    Guideline Day 2    Level Of Care    (X) Floor    Clinical Status    ( ) * Procedure completed    (X) * Hypotension absent    4/19/2021 13:15:24 EDT by Chelsi Roman      /64, P 72, R 18    (X) * Mental status at baseline    (X) * Afebrile or fever improved    4/19/2021 13:15:24 EDT by Chelsi Roman      98.2    (X) * Acidosis absent    (X) * Blood glucose under acceptable control    4/19/2021 13:15:24 EDT by Chelsi Roman      GLUC 88    (X) * Dehydration absent    (X) * Electrolyte abnormalities absent or improved    4/19/2021 13:15:24 EDT by Chelsi Roman      , CA 8.9    Activity    ( ) * Assisted ambulation    Routes    (X) * Advance diet    (X) IV or oral hydration, medications    4/19/2021 13:15:24 EDT by Chelsi Roman      SAME SCHED MEDS    Interventions    (X) WBC    (X) Serum glucose, electrolytes    (X) Wound management    Medications    (X) * PCA absent [Q]    (X) Possible antibiotics [R]    4/19/2021 13:15:25 EDT by Gina Clifton      SAME    (X) Possible analgesics    4/19/2021 13:15:25 EDT by Gina Clifton      TYLENOL 650MG PO Q 6 HR PRN  X1, MORPHINE 1MG IV Q 4 HR PRN X 4, PERCOCET 1 TAB PO Q 4 HR PRN X 4,    * Milestone   Additional Notes   Hospitalist Progress Note         \" Continues to report pain in the left lower extremity.   no more fever noticed.  Pain is appropriately controlled with current pain medication\".  Discussed with RN events overnight      EXAM: General:          WD, WN. Alert, cooperative, no acute distress     EENT:              EOMI. Anicteric sclerae. MMM   Resp:               CTA bilaterally, no wheezing or rales.  No accessory muscle use   CV:                  Regular  rhythm,  No edema   GI:                   Soft, Non distended, Non tender.  +Bowel sounds   Neurologic:      Alert and oriented X 3, normal speech,    Psych:             Good insight. Not anxious nor agitated   Skin:                Necrosis and gangrenous looking left lower extremity ulceration.       WBC 12.2, RBC 3.68, HGB 10.2, HCT 33.2, NEUTS 76, , CREAT 0.54,       Assessment / Plan:       Sepsis POA: Temperature 101.4 at rate 108 WBC 25k   Infected extensive chronic  left lower extremity setting of pyoderma gangrenosum   Leukocytosis improving   Continue current antibiotics including Zyvox, Flagyl and cefepime   Patient is listed allergy to vancomycin   Wound culture is growing Enterococcus species and gram-negative rods   Appreciated orthopedic, vascular surgery in general surgery consult   Recommended amputation but initially refused   He wants to continue the current treatment and likely biopsy   Will talk to the surgical team about obtaining a biopsy    His biopsy in 2019 showed likely pyoderma gangrenosum   Continue methadone and Percocet for pain control   Use as needed IV morphine for breakthrough pain           History of traumatic subarachnoid hemorrhage in December 2020   Avoid NSAIDs and blood thinners   Will place SCDs for DVT prophylaxis   History of gout and osteoarthritis of both knees    Per chart review, patient was supposed to get knee replacement           # Chronic methadone use: Continue home dose methadone    # Hypothyroidism: Continue Synthroid   # HTN: Hold BP meds   # Left sided weakness w/ contracture.  Chronic                      Foot: Surgical Wound Care - Care Day 3 (4/17/2021) by Silverio Gillespie       Review Status Review Entered   Completed 4/19/2021 13:09      Criteria Review      Care Day: 3 Care Date: 4/17/2021 Level of Care: Telemetry    Guideline Day 2    Level Of Care    (X) Floor    Clinical Status    ( ) * Procedure completed    4/19/2021 13:09:47 EDT by Tal Denise pending    (X) * Hypotension absent    4/19/2021 13:09:47 EDT by Silverio Gillespie      bp 133/69, p 80    (X) * Mental status at baseline    (X) * Afebrile or fever improved    4/19/2021 13:09:47 EDT by Silverio Gillespie      98    (X) * Acidosis absent    4/19/2021 13:09:47 EDT by Silverio Gillespie      c02 27    (X) * Blood glucose under acceptable control    4/19/2021 13:09:47 EDT by Silverio Gillespie      gluc 114    (X) * Dehydration absent    ( ) * Electrolyte abnormalities absent or improved    4/19/2021 13:09:47 EDT by Silverio Gillespie      , CA 8.4,    Activity    ( ) * Assisted ambulation    Routes    (X) * Advance diet    (X) IV or oral hydration, medications    4/19/2021 13:09:47 EDT by Silverio Gillespie      IV FLS DC'D,  ZYLOPRIM 100 MG PO QD, NEURONTIN 100 MG PO TID, SYNTHROID 112 MCG PO QD, ZOFRAN 4 MG IV Q 6 HR PRN X1, FLOMAX 0.4MG PO QD, CHANTIX 0.5MG PO QD,    Interventions    (X) WBC    4/19/2021 13:09:47 EDT by Silverio Gillespie      wbc 13.0, rbc 3.48, hgb 9.9, hct 31.0, na 135, gluc 114, bun 5, creat 0.61, ca 8.4,    (X) Serum glucose, electrolytes    (X) Wound management    Medications    (X) * PCA absent [Q]    (X) Possible antibiotics [R]    4/19/2021 13:09:47 EDT by Cholo Parker      maxipime 2g iv q 8 hrs,  zyvox 600 mg iv q 12 hr, flsgyl 500 mg iv q 8 hrs,    (X) Possible analgesics    4/19/2021 13:09:47 EDT by Cholo Parker      morphine 1mg iv q 4 hr prn x 3, percocet 1 tab po q 4 hr prn x 4,    * Milestone   Additional Notes      Hospitalist Progress Note      Continues to report pain in the left lower extremity.   no more fever noticed\". EXAM:  General:          WD, WN. Alert, cooperative, no acute distress     EENT:              EOMI. Anicteric sclerae. MMM   Resp:               CTA bilaterally, no wheezing or rales.  No accessory muscle use   CV:                  Regular  rhythm,  No edema   GI:                   Soft, Non distended, Non tender.  +Bowel sounds   Neurologic:      Alert and oriented X 3, normal speech,    Psych:             Good insight.  Not anxious nor agitated   Skin:                Necrosis and gangrenous looking left lower extremity ulceration.          Assessment / Plan:       Sepsis POA: Temperature 101.4 at rate 108 WBC 25k   Infected extensive chronic  left lower extremity setting of pyoderma gangrenosum   Leukocytosis improving   Continue current antibiotics including Zyvox, Flagyl and cefepime   Patient is listed allergy to vancomycin   Appreciated orthopedic, vascular surgery in general surgery consult   Recommended amputation but initially refused   He reports to me today that he is thinking of changing his mind and may be able to tell us by tomorrow if he wants amputation.    His biopsy in 2019 showed likely pyoderma gangrenosum   Cultures are awaited   Continue methadone and Percocet for pain control   Use as needed IV morphine for breakthrough pain   If patient does not amputation we will continue antibiotics and plan for biopsy probably on Monday       History of traumatic subarachnoid hemorrhage in December 2020   Avoid NSAIDs and blood thinners   Will place SCDs for DVT prophylaxis History of gout and osteoarthritis of both knees    Per chart review, patient was supposed to get knee replacement           # Chronic methadone use: Continue home dose methadone    # Hypothyroidism: Continue Synthroid   # HTN: Hold BP meds   # Left sided weakness w/ contracture. 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, s/p recent treatment with 5/6 weeks of Zosyn IV at [facility]   \" H/o Pyoderma gangrenosum vs malignancy on pathology in 4/19   \" S/p intra lesional steroids by Dermatology   \" Pt has declined amputation that has been recommende by multiple subspecialtues   \" Chronic pain syndrome on methadone    \" H/o hemorrhagic stroke with residual left sided weakness   \" Seizure disorder   \" Hypertension stable   \" Hypothyroidism on synthroid           PLAN:       \" Wound culture - aerobic,anaerobic, fungal cultures   \" Empiric antimicrobials   \" Recommend amputation per Podiatry, Vascular recommendations   \" Recommend repeat biopsy in multiple areas of wound, development of malignancy due to chronicity of wound needs to be excluded.

## 2021-04-19 NOTE — PROGRESS NOTES
Transition of Care Plan:     RUR: 21% - moderate  Disposition: TBD - SNF vs HH  Follow up appointments: New PCP appt, Ortho? DME needed: TBD  Transportation at Discharge: Pt's friend  [place] or means to access home:  Pt's friend has access      Caregiver Contact: Asia Ross ([phone])  Discharge Caregiver contacted prior to discharge? CM will contact pt's cg 24 hours prior to d/c    CM reviewed chart. Pt is not medically stable for d/c at this time. CM will continue to follow for discharge planning while patient is admitted on current unit. Please contact this CM with questions or issues related to discharge.      Shae Lopez MSSHARRI  Care Manager [id number] [address] [facility]  [phone]

## 2021-04-19 NOTE — PROGRESS NOTES
Pharmacy Automatic Renal Dosing Protocol - Antimicrobials    Indication for Antimicrobials: SSTI     Current Regimen of Each Antimicrobial:  Cefepime 2 g IV Q8H (Start Date 4/15; Day # 5)  Metronidazole 500 mg IV Q8H (Start Date 4/15; Day #5)  Linezolid 600 mg PO BID (Start Date 4/15; Day 5)    Previous Antimicrobial Therapy:  Zosyn x1  Vancomycin - pharmacy to dose    Significant Cultures:   4/15: blood - NGTD - prelim  : wound- enterococcus faecalis, ESBL E.Coli, Pseudomonas, Alcaligenes Faecalis - Prelim   anaerobic: prelim  : fungus: prelim    Radiology / Imaging results: (X-ray, CT scan or MRI):     Paralysis, amputations, malnutrition: None    Labs:  Recent Labs     21  0446 21  0357 21  0123   CREA 0.61* 0.54* 0.61*   BUN 7 6 5*   WBC 11.2* 12.2* 13.0*     Temp (24hrs), Av.2 °F (36.8 °C), Min:97.9 °F (36.6 °C), Max:98.5 °F (36.9 °C)    Is the Patient on Dialysis? No    Creatinine Clearance (mL/min):   CrCl (Actual Body Weight): 181.4  CrCl (Adjusted Body Weight): 152.5  CrCl (Ideal Body Weight): 133.2    Impression/Plan:   Linezolid 600 mg PO BID   Cefepime 2 g IV Q8H per protocol   Metronidazole okay,   ESBL E.Coli and Pseudomonas are resistant to Cefepime. We may have to switch to meropenem if source control is not achieved. Antimicrobial stop date TBD     Pharmacy will follow daily and adjust medications as appropriate for renal function and/or serum levels. Thank you,  Fidel Arizmendi, CARLOSD    Recommended duration of therapy  [email or web address]/[facility]/Pharmacy/Clinical%20Companion/[email or web address]. docx    Renal Dosing  [email or web address]/[facility]/Pharmacy/Clinical%20Companion/[id number]. pdf

## 2021-04-19 NOTE — PROGRESS NOTES
End of Shift Note    Bedside shift change report given to Nicole Rosas (oncoming nurse) by Giselle Machado (offgoing nurse). Report included the following information SBAR, Kardex, Intake/Output, MAR, Accordion and Recent Results    Shift worked:  [id number]     Shift summary and any significant changes:     No significant changes. Given morphine 2X & Percocet 3X. Concerns for physician to address:       Zone phone for oncoming shift:   [phone]       Activity:  Activity Level: Up with Assistance  Number times ambulated in hallways past shift: 0  Number of times OOB to chair past shift: 0    Cardiac:   Cardiac Monitoring: Yes      Cardiac Rhythm: Normal sinus rhythm    Access:   Current line(s): PIV     Genitourinary:   Urinary status: voiding    Respiratory:   O2 Device: None (Room air)  Chronic home O2 use?: NO  Incentive spirometer at bedside: NO     GI:  Last Bowel Movement Date: 04/17/21  Current diet:  DIET NPO  Passing flatus: YES  Tolerating current diet: YES       Pain Management:   Patient states pain is manageable on current regimen: NO    Skin:  Dejuan Score: 14  Interventions: float heels    Patient Safety:  Fall Score:  Total Score: 3  Interventions: assistive device (walker, cane, etc) and pt to call before getting OOB  High Fall Risk: Yes    Length of Stay:  Expected LOS: 3d 12h  Actual LOS: 4      Giselle Machado

## 2021-04-19 NOTE — PROGRESS NOTES
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 Coli, Pseudomonas, Enterococcus faecalis, Alcaligenes faecalis  · H/o Pyoderma gangrenosum vs malignancy on pathology in 4/2019  · S/p intra lesional steroids by Dermatology  · Pt has declined amputation that has been recommended by multiple subspecialtues  · Chronic pain syndrome on methadone   · H/o hemorrhagic stroke with residual left sided weakness  · Seizure disorder  · Hypertension stable  · Hypothyroidism on synthroid       PLAN:      · Patient has been changed to Meropenem Zyvox IV, continue same   · D/w pt current wound cultures & presence of multiple organisms including ESBL  . Patient advised that continued antibiotic therapy would be futile and predispose to development of resistant organisms that would be more difficult to treat over time. Patient has infection that has spread further up his leg, in spite of being on antimicrobials. Patient at this time would probably require AKA. · CT left lower extremity-evaluate for osteomyelitis  · D/w patient that amputation is his best option, patient voiced understanding  · D/w Dr Karolyn Silverio       Patient seen today. Awake coherent  Discussed wound culture results with patient. Marilyn Franco, 63 y.o. male with PMHx significant for prior CVA, seizures, prior DVT, GERD, bladder cancer, chronic nonhealing   wound on his left lower extremity x 2 years , recommendation for BKA by multiple specialties, but pt continues to refuse. Pathology on 4/25/19 was + for Pyoderma gangrenosum vs malignancy. Patient  presented with concerns for worsening infection of the left lower extremity wound. Patient reports that the wound has been present for two and half years .  He had previously been on Zosyn iv  via PICC line for reported Pseudomonas and E. coli infection. He has been off antibiotic since the middle of March. Per ED note Caregiver reported that over the last 24 hours the leg had gotten worse with spreading redness above the area of the chronic wound. Patient had reported chronic pain in the area that is not significantly changed. No fevers at home. No chest pain, shortness of breath, abdominal pain, nausea, vomiting. Per Records pt was admitted at hospital at [place]. 2/9-2/26  Beaue.[age] y.o. male who presented to ED for IV antibiotics he has a chronic left lower extremity wound and history of pyoderma gangrenosum. He has been getting steroid injections and reports that his leg has been doing better however apparently he had a culture done reasons unclear that showed ESBL and Pseudomonas and he was sent in for IV antibiotics. He denies any fevers or chills or worsening of his chronic wound. He has chronic pain which is unchanged. The dermatologist to send him into the hospital was not reachable tonight. ( Dr. Rashi Dietrich) patient has had this left lower extremity ulceration for over 2 years and maybe it has been worsening but he was a very poor historian. He had been taking steroids every 4 to 6 weeks and thought it was improving and scabbing over and not draining as much       Problems Managed During Hospitalization:  1. Infected Chronic LLE wounds with cellulitis with possible osteomyelitis of the lateral malleolus   Wounds have been present by report for 2 1/2 yrs.    -Chronic extensive LLE Pyoderma gangrenosum dx'ed 11/2020 with superimposed infected wounds with cellulitis and CT suggestive of lateral malleolus OM   -PG treated with intralesional steroid injections per Dermatology as OP Dr Rashi Dietrich  -Unclear if he ever received immunomodulator as his PG appears severe  -His cultures showed pseudomonas and ESBL ( ?colonization vs true infection)Currently on zosyn and ID following  -He could not tolerate MRI x 2 even after benzo for anxiety   -He was seen by vascular and Podiatry, offered BKA. PATIENT DECLINED AMPUTATION and would like to continue with his dermatologist and plastic  -He had debridements in the past but unclear if it helps or exacerbates PG( Pathergy)  -seen in consult by ID and has PICC placed for prolonged course of IV antibiotics per ID. 6 weeks of antibiotics   -confirmed his desire for no amputation. ID has discussed about possible outcomes including persistence or worsening of infection including sepsis and death despite long-term abx with his decision to not have amputation. He expresses understanding. Pt seen today . Appears drowsy . S/p Wound care . D/w Wound Care . Infected & necrotic wounds present. 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    FH: bladder cancer Z80.52    Left foot infection L08.9    Major depression F53.2    Uncomplicated opioid dependence ([facility]) F11.20    Chronic obstructive pulmonary disease (HCC) J44.9    Chronic pain disorder G89.4    Hypokalemia E87.6    Seizure disorder ([facility]) G40.909    Tobacco abuse Z72.0    Foot ulcer ([facility]) L97.509    Nonadherence to medical treatment Z91.19    Sinus bradycardia R00.1    Gout M10.9    Hypothyroidism E03.9    Foot infection L08.9    Depression F32.9    Open wound, lower leg S81.809A    Pyoderma gangrenosum L88    Traumatic subarachnoid hemorrhage ([facility] Y28.7D4J    Traumatic subarachnoid hemorrhage without loss of consciousness ([address] S06.6X0A    Infected wound T14. 8XXA, L08.9    Sepsis ([address] A41.9     Past Medical History:   Diagnosis Date    Aneurysm ([address]     (with stroke)    Bladder tumor     Cancer ([address]     bladder CA    Chronic pain     Family history of bladder cancer     GERD (gastroesophageal reflux disease)     Gout     History of vascular access device 04/25/2019    [facility] VAT 4 FR MIDLINE R Cephalic Limited access    History of vascular access device 04/26/2019    4 fr Midline right Cephalic midline access    Hypercholesterolemia     Hypertension     Lesion of bladder     Seizures ([facility]     Stroke ([facility] [address] 2006    left sided weakness    Thromboembolus ([facility] [address]     left leg    Thyroid disease     TIA (transient ischemic attack) 2012      Family History   Problem Relation Age of Onset    Diabetes Father     Lung Disease Father     Diabetes Sister     Diabetes Brother     Cancer Paternal Grandfather         Bladder      Social History     Tobacco Use    Smoking status: Current Every Day Smoker     Packs/day: 0.50    Smokeless tobacco: Never Used    Tobacco comment: instructed not to smoke 24 hrs prior surgery   Substance Use Topics    Alcohol use: Yes     Alcohol/week: 6.0 standard drinks     Types: 6 Cans of beer per week     Comment: occasional     Past Surgical History:   Procedure Laterality Date    HX ORTHOPAEDIC      R arthroscopy    HX OTHER SURGICAL      x2 L foot    HX UROLOGICAL  04/20/2018    Cystoscopy, TURBT (greater than 5 cm resected) Dr. Leonie Ordoñez, [facility].  KNEE ARTHROSCP HARV      left knee    TRANSURETHRAL RESEC BLADDER NECK  08/10/2018      Prior to Admission medications    Medication Sig Start Date End Date Taking?  Authorizing Provider   colchicine 0.6 mg tablet Take 0.6 mg by mouth daily as needed for Gout or Pain. Indications: acute inflammation of the joints due to gout attack   Yes Provider, Historical   varenicline (Chantix Starting Month Box) 0.5 mg (11)- 1 mg (42) DsPk Take  by mouth. Take one tablet by mouth in morning with food for 3 days then increase to 1 tablet by mouth twice daily with food thereafter as directed   Indications: stop smoking   Yes Provider, Historical   tamsulosin (FLOMAX) 0.4 mg capsule Take 1 capsule by mouth daily after dinner  3/2/21  Yes Anisa Butterfield MD   lidocaine (XYLOCAINE) 4 % topical cream Apply  to affected area as needed for Pain (apply to left lower leg wounds during dressing changes). 12/22/20  Yes Leyla Kent MD   gabapentin (NEURONTIN) 100 mg capsule Take 100 mg by mouth three (3) times daily. Yes Provider, Historical   levothyroxine (SYNTHROID) 112 mcg tablet Take 112 mcg by mouth Daily (before breakfast). Yes Provider, Historical   lisinopriL (PRINIVIL, ZESTRIL) 20 mg tablet Take 20 mg by mouth daily. Yes Provider, Historical   naproxen sodium (Aleve) 220 mg tablet Take 220 mg by mouth three (3) times daily as needed. Yes Provider, Historical   aspirin 81 mg chewable tablet Take 81 mg by mouth daily. Yes Provider, Historical   pantoprazole (PROTONIX) 40 mg tablet Take 40 mg by mouth daily as needed. 7/17/19  Yes Provider, Historical   allopurinol (ZYLOPRIM) 100 mg tablet Take 1 Tab by mouth daily. Indications: treatment to prevent acute gout attack 5/24/19  Yes Trisha Maguire NP   senna-docusate (DARELL-COLACE) 8.6-50 mg per tablet Take 1 Tab by mouth daily as needed. Indications: constipation   Yes Provider, Historical   methadone (DOLOPHINE) 10 mg/mL solution Take 140 mg by mouth daily. Indications: excessive pain   Yes Provider, Historical   melatonin 3 mg tablet Take 1 Tab by mouth nightly as needed.  10/12/18  Yes Carla Clayton MD     Allergies   Allergen Reactions    Sulfamethoxazole-Trimethoprim Rash     L eye and L hand    Ciprofloxacin Hives     Per pcp records    Codeine Hives     Tolerates dilaudid, oxycodone    Cymbalta [Duloxetine] Other (comments)     Confusion and memory loss    Lyrica [Pregabalin] Hives    Sulfa (Sulfonamide Antibiotics) Rash    Ultram [Tramadol] Hives    Vancomycin Shortness of Breath    Zosyn [Piperacillin-Tazobactam] Rash        Review of Systems:  A comprehensive review of systems was negative except for that written in the History of Present Illness. 10 point review of systems obtained . All other systems negative    Objective:   Blood pressure 110/69, pulse 66, temperature 98.1 °F (36.7 °C), resp. rate 20, height 5' 6\" (1.676 m), weight 240 lb (108.9 kg), SpO2 93 %.   Temp (24hrs), Av.3 °F (36.8 °C), Min:98.1 °F (36.7 °C), Max:98.5 °F (36.9 °C)    Current Facility-Administered Medications   Medication Dose Route Frequency    meropenem (MERREM) 500 mg in 0.9% sodium chloride (MBP/ADV) 50 mL MBP  500 mg IntraVENous Q6H    acetaminophen (TYLENOL) tablet 650 mg  650 mg Oral Q6H PRN    ondansetron (ZOFRAN) injection 4 mg  4 mg IntraVENous Q6H PRN    polyethylene glycol (MIRALAX) packet 17 g  17 g Oral DAILY    LORazepam (ATIVAN) injection 1 mg  1 mg IntraVENous ON CALL    morphine injection 1 mg  1 mg IntraVENous Q4H PRN    levothyroxine (SYNTHROID) tablet 112 mcg  112 mcg Oral ACB    linezolid in dextrose 5% (ZYVOX) IVPB premix in D5W 600 mg  600 mg IntraVENous Q12H    lidocaine (XYLOCAINE) 4 % cream   Topical DAILY    sodium chloride (NS) flush 5-10 mL  5-10 mL IntraVENous PRN    allopurinoL (ZYLOPRIM) tablet 100 mg  100 mg Oral DAILY    gabapentin (NEURONTIN) capsule 100 mg  100 mg Oral TID    melatonin tablet 3 mg  3 mg Oral QHS PRN    tamsulosin (FLOMAX) capsule 0.4 mg  0.4 mg Oral DAILY    methadone (DOLOPHINE) 10 mg/mL concentrated solution 140 mg  140 mg Oral DAILY    varenicline (CHANTIX) 0.5 mg tablet 0.5 mg  0.5 mg Oral BIDPC    Followed by   Lor Castanon ON 2021] varenicline (CHANTIX) 0.5 mg tablet 1 mg  1 mg Oral BIDPC    oxyCODONE-acetaminophen (PERCOCET) 5-325 mg per tablet 1 Tab  1 Tab Oral Q4H PRN        Exam:    General:  Awake, drowsy ,cooperative,    Eyes:  Sclera anicteric. Pupils equally round and reactive to light. Mouth/Throat: Mucous membranes , oral pharynx mildly dry   Neck: Supple   Lungs:   Reduced auscultation bilaterally, good effort   CV:  Regular rate and rhythm,no murmur, click, rub or gallop   Abdomen:   Soft, non-tender.  bowel sounds normal. non-distended   Extremities: No  edema   Skin: Skin color, texture, turgor normal. no acute rash or lesions   Lymph nodes: Cervical and supraclavicular normal   Musculoskeletal: LLE dressing +   Lines/Devices:  Intact, no erythema, drainage or tenderness   Psych: Awake  and oriented, normal mood affect        Data Review:   CBC:   Recent Labs     04/19/21 0446 04/18/21  0357 04/17/21  0123   WBC 11.2* 12.2* 13.0*   RBC 3.64* 3.68* 3.48*   HGB 10.3* 10.2* 9.9*   HCT 32.4* 33.2* 31.0*    218 179   GRANS 69 76* 71   LYMPH 11* 7* 9*   EOS 7 6 7     CMP:   Recent Labs     04/19/21 0446 04/18/21  0357 04/17/21  0123   * 88 114*    135* 135*   K 3.7 4.1 4.0    103 103   CO2 28 27 27   BUN 7 6 5*   CREA 0.61* 0.54* 0.61*   CA 8.5 8.9 8.4*   AGAP 4* 5 5   BUCR 11* 11* 8*       Lab Results   Component Value Date/Time    Culture result: NO ANAEROBES ISOLATED 04/16/2021 02:36 PM    Culture result: NO FUNGUS ISOLATED 3 DAYS 04/16/2021 02:36 PM    Culture result: LIGHT ENTEROCOCCUS FAECALIS (A) 04/16/2021 02:35 PM    Culture result: (A) 04/16/2021 02:35 PM     LIGHT ESCHERICHIA COLI ** (EXTENDED SPECTRUM BETA LACTAMASE ) **    Culture result: (A) 04/16/2021 02:35 PM     LIGHT PSEUDOMONAS AERUGINOSA PIP/JUANITO SENSITIVITY TO FOLLOW    Culture result: LIGHT ALCALIGENES FAECALIS (A) 04/16/2021 02:35 PM          XR Results (most recent):  Results from Hospital Encounter encounter on 04/15/21   XR ANKLE LT AP/LAT Narrative EXAM: XR ANKLE LT AP/LAT    INDICATION: Trauma. COMPARISON: None. FINDINGS: Two views of the left ankle demonstrate diffuse soft tissue swelling  and osteopenia and mild DJD, no definite fracture. Impression  impression: No acute bone findings suspected. ICD-10-CM ICD-9-CM    1. Sepsis, due to unspecified organism, unspecified whether acute organ dysfunction present ([address]  A41.9 038.9      995.91    2. Cellulitis of left lower extremity  L03.116 682.6    3. Wound of left foot  S91.302A 892.0    4. History of bladder cancer  Z85.51 V10.51    5. Multiple open wounds of lower leg, left, initial encounter  S81.802A 894.0    6. Subarachnoid hemorrhage (HCC)  I60.9 430    7. Infected wound  T14. 8XXA 958.3     L08.9     8. Brain aneurysm  I67.1 437.3    9. Cerebral hemorrhage with hemiparesis (HCC)  I61.9 431     G81.90 342.90    10. Acquired hypothyroidism  E03.9 244.9            Antibiotic History  Current Regimen of Each Antimicrobial:  Cefepime 2 g IV Q8H (Start Date 4/15; Day # 4)  Metronidazole 500 mg IV Q8H (Start Date 4/15; Day #4)  Linezolid 600 mg PO BID (Start Date 4/15; Day 4)     Previous Antimicrobial Therapy:  Zosyn x1  Vancomycin - pharmacy to dose        S/p Zosyn IV - x 5 weeks , last dose 3/21/21  I have discussed the diagnosis with the patient and the intended plan as seen in the above orders. I have discussed medication side effects and warnings with the patient as well.     Reviewed test results at length with patient    Signed By: Dione Arora MD FACP

## 2021-04-19 NOTE — PROGRESS NOTES
Problem: Falls - Risk of  Goal: *Absence of Falls  Description: Document Alexandria Sherwood Fall Risk and appropriate interventions in the flowsheet. Outcome: Progressing Towards Goal  Note: Fall Risk Interventions:  Mobility Interventions: Patient to call before getting OOB         Medication Interventions: Teach patient to arise slowly, Patient to call before getting OOB    Elimination Interventions: Call light in reach, Toileting schedule/hourly rounds    History of Falls Interventions: Room close to nurse's station         Problem: Pressure Injury - Risk of  Goal: *Prevention of pressure injury  Description: Document Dejuan Scale and appropriate interventions in the flowsheet. Outcome: Progressing Towards Goal  Note: Pressure Injury Interventions:  Sensory Interventions: Keep linens dry and wrinkle-free, Minimize linen layers, Assess changes in LOC, Check visual cues for pain    Moisture Interventions: Absorbent underpads, Apply protective barrier, creams and emollients    Activity Interventions: Chair cushion    Mobility Interventions: Chair cushion, Turn and reposition approx.  every two hours(pillow and wedges)    Nutrition Interventions: Document food/fluid/supplement intake    Friction and Shear Interventions: Feet elevated on foot rest, Apply protective barrier, creams and emollients, Minimize layers

## 2021-04-19 NOTE — PROGRESS NOTES
Hospitalist Progress Note    NAME: Tim Brink   :  1965   MRN:  [id number]       Assessment / Plan:    Sepsis POA: Temperature 101.4 at rate 108 WBC 25k  Infected extensive chronic  left lower extremity setting of pyoderma gangrenosum  Leukocytosis improving   current antibiotics including Zyvox, Flagyl and cefepime  Patient is listed allergy to vancomycin  Wound cultures  Culture result: Abnormal       Preliminary   LIGHT ENTEROCOCCUS FAECALIS    Culture result: Abnormal       Preliminary   LIGHT ESCHERICHIA COLI ** (EXTENDED SPECTRUM BETA LACTAMASE ) **    Culture result: Abnormal       Preliminary   LIGHT PSEUDOMONAS AERUGINOSA PIP/JUANITO SENSITIVITY TO FOLLOW    Culture result: Abnormal       Preliminary   LIGHT ALCALIGENES FAECALIS          Transition antibiotics to Zyvox and meropenem based on the sensitivities  Appreciated orthopedic, vascular surgery in general surgery consult  Recommended amputation but patient refused  He wants to continue the current treatment and likely biopsy  Spoke to the surgical team about biopsy and recommended that biopsy is a futile effort as his limb is not salvageable   His biopsy in  showed likely pyoderma gangrenosum  Continue methadone and Percocet for pain control  Use as needed IV morphine for breakthrough pain  We will obtain ethics consult as this patient has 2 years of struggling with this lower extremity wounds and ulcers and have been recommended amputation by multiple providers but he continues to refuse.   He just wants antibiotics which are futile at this point due to severe necrosis of his lower extremity       History of traumatic subarachnoid hemorrhage in 2020  Avoid NSAIDs and blood thinners  Continue SCDs for DVT prophylaxis  History of gout and osteoarthritis of both knees   Per chart review, patient was supposed to get knee replacement        # Chronic methadone use: Continue home dose methadone   # Hypothyroidism: Continue Synthroid  # HTN: Hold BP meds  # Left sided weakness w/ contracture. Chronic            Code Status: Full code  Surrogate Decision Maker:  Aakash Lopez Other Relative [phone]      DVT Prophylaxis: SCDs  GI Prophylaxis: not indicated     Baseline: Ambulatory      Discontinue IV fluids today     Subjective:     Chief Complaint / Reason for Physician Visit  \" Continues to report pain in the left lower extremity. no more fever noticed. Pain is appropriately controlled with current pain medication\". Discussed with RN events overnight. Review of Systems:  Symptom Y/N Comments  Symptom Y/N Comments   Fever/Chills n   Chest Pain n    Poor Appetite n   Edema n    Cough n   Abdominal Pain n    Sputum n   Joint Pain     SOB/BECK n   Pruritis/Rash     Nausea/vomit n   Tolerating PT/OT     Diarrhea n   Tolerating Diet     Constipation n   Other       Could NOT obtain due to:      Objective:     VITALS:   Last 24hrs VS reviewed since prior progress note. Most recent are:  Patient Vitals for the past 24 hrs:   Temp Pulse Resp BP SpO2   04/19/21 1043 98.2 °F (36.8 °C) 75 19 (!) 120/58 92 %   04/19/21 0754 98.5 °F (36.9 °C) 73 18 139/74 93 %   04/19/21 0246 98.2 °F (36.8 °C) 86 19 132/70 95 %   04/18/21 2247 98.4 °F (36.9 °C) 74 19 128/67 95 %   04/18/21 1920 98.1 °F (36.7 °C) 82 19 115/63 92 %   04/18/21 1509 97.9 °F (36.6 °C) 78 18 118/73 93 %       Intake/Output Summary (Last 24 hours) at 4/19/2021 1306  Last data filed at 4/19/2021 0902  Gross per 24 hour   Intake 1000 ml   Output 2050 ml   Net -1050 ml      Face-to-face encounter was provided on April 19, 2021 with the following findings  PHYSICAL EXAM:  General: WD, WN. Alert, cooperative, no acute distress    EENT:  EOMI. Anicteric sclerae. MMM  Resp:  CTA bilaterally, no wheezing or rales.   No accessory muscle use  CV:  Regular  rhythm,  No edema  GI:  Soft, Non distended, Non tender.  +Bowel sounds  Neurologic:  Alert and oriented X 3, normal speech,   Psych:   Good insight. Not anxious nor agitated  Skin:  Necrosis and gangrenous looking left lower extremity ulceration. Reviewed most current lab test results and cultures  YES  Reviewed most current radiology test results   YES  Review and summation of old records today    NO  Reviewed patient's current orders and MAR    YES  PMH/SH reviewed - no change compared to H&P  ________________________________________________________________________  Care Plan discussed with:    Comments   Patient x    Family      RN x    Care Manager     Consultant                        Multidiciplinary team rounds were held today with , nursing, pharmacist and clinical coordinator. Patient's plan of care was discussed; medications were reviewed and discharge planning was addressed. ________________________________________________________________________  Total NON critical care TIME:  35  Minutes    Total CRITICAL CARE TIME Spent:   Minutes non procedure based      Comments   >50% of visit spent in counseling and coordination of care     ________________________________________________________________________  Toy MD Sola     Procedures: see electronic medical records for all procedures/Xrays and details which were not copied into this note but were reviewed prior to creation of Plan. LABS:  I reviewed today's most current labs and imaging studies.   Pertinent labs include:  Recent Labs     04/19/21 0446 04/18/21 0357 04/17/21  0123   WBC 11.2* 12.2* 13.0*   HGB 10.3* 10.2* 9.9*   HCT 32.4* 33.2* 31.0*    218 179     Recent Labs     04/19/21 0446 04/18/21  0357 04/17/21  0123    135* 135*   K 3.7 4.1 4.0    103 103   CO2 28 27 27   * 88 114*   BUN 7 6 5*   CREA 0.61* 0.54* 0.61*   CA 8.5 8.9 8.4*       Signed: Francis Selby MD

## 2021-04-20 LAB
ANION GAP SERPL CALC-SCNC: 1 MMOL/L (ref 5–15)
BACTERIA SPEC CULT: ABNORMAL
BASOPHILS # BLD: 0 K/UL (ref 0–0.1)
BASOPHILS NFR BLD: 0 % (ref 0–1)
BUN SERPL-MCNC: 7 MG/DL (ref 6–20)
BUN/CREAT SERPL: 9 (ref 12–20)
CALCIUM SERPL-MCNC: 8.5 MG/DL (ref 8.5–10.1)
CHLORIDE SERPL-SCNC: 102 MMOL/L (ref 97–108)
CO2 SERPL-SCNC: 33 MMOL/L (ref 21–32)
CREAT SERPL-MCNC: 0.74 MG/DL (ref 0.7–1.3)
DIFFERENTIAL METHOD BLD: ABNORMAL
EOSINOPHIL # BLD: 0.7 K/UL (ref 0–0.4)
EOSINOPHIL NFR BLD: 6 % (ref 0–7)
ERYTHROCYTE [DISTWIDTH] IN BLOOD BY AUTOMATED COUNT: 17.2 % (ref 11.5–14.5)
GLUCOSE SERPL-MCNC: 107 MG/DL (ref 65–100)
GRAM STN SPEC: ABNORMAL
GRAM STN SPEC: ABNORMAL
HCT VFR BLD AUTO: 33.4 % (ref 36.6–50.3)
HGB BLD-MCNC: 10.5 G/DL (ref 12.1–17)
IMM GRANULOCYTES # BLD AUTO: 0 K/UL (ref 0–0.04)
IMM GRANULOCYTES NFR BLD AUTO: 0 % (ref 0–0.5)
LYMPHOCYTES # BLD: 1.2 K/UL (ref 0.8–3.5)
LYMPHOCYTES NFR BLD: 10 % (ref 12–49)
MCH RBC QN AUTO: 28.1 PG (ref 26–34)
MCHC RBC AUTO-ENTMCNC: 31.4 G/DL (ref 30–36.5)
MCV RBC AUTO: 89.3 FL (ref 80–99)
METAMYELOCYTES NFR BLD MANUAL: 3 %
MONOCYTES # BLD: 0.9 K/UL (ref 0–1)
MONOCYTES NFR BLD: 8 % (ref 5–13)
NEUTS SEG # BLD: 8.4 K/UL (ref 1.8–8)
NEUTS SEG NFR BLD: 73 % (ref 32–75)
NRBC # BLD: 0 K/UL (ref 0–0.01)
NRBC BLD-RTO: 0 PER 100 WBC
PLATELET # BLD AUTO: 203 K/UL (ref 150–400)
PMV BLD AUTO: 8.6 FL (ref 8.9–12.9)
POTASSIUM SERPL-SCNC: 3.9 MMOL/L (ref 3.5–5.1)
RBC # BLD AUTO: 3.74 M/UL (ref 4.1–5.7)
RBC MORPH BLD: ABNORMAL
SERVICE CMNT-IMP: ABNORMAL
SODIUM SERPL-SCNC: 136 MMOL/L (ref 136–145)
WBC # BLD AUTO: 11.5 K/UL (ref 4.1–11.1)

## 2021-04-20 PROCEDURE — 80048 BASIC METABOLIC PNL TOTAL CA: CPT

## 2021-04-20 PROCEDURE — 65660000000 HC RM CCU STEPDOWN

## 2021-04-20 PROCEDURE — 36415 COLL VENOUS BLD VENIPUNCTURE: CPT

## 2021-04-20 PROCEDURE — 74011250637 HC RX REV CODE- 250/637: Performed by: INTERNAL MEDICINE

## 2021-04-20 PROCEDURE — 74011250636 HC RX REV CODE- 250/636: Performed by: INTERNAL MEDICINE

## 2021-04-20 PROCEDURE — 99232 SBSQ HOSP IP/OBS MODERATE 35: CPT | Performed by: INTERNAL MEDICINE

## 2021-04-20 PROCEDURE — 74011000250 HC RX REV CODE- 250: Performed by: INTERNAL MEDICINE

## 2021-04-20 PROCEDURE — 74011000258 HC RX REV CODE- 258: Performed by: INTERNAL MEDICINE

## 2021-04-20 PROCEDURE — 99223 1ST HOSP IP/OBS HIGH 75: CPT | Performed by: NURSE PRACTITIONER

## 2021-04-20 PROCEDURE — 2709999900 HC NON-CHARGEABLE SUPPLY

## 2021-04-20 PROCEDURE — 85025 COMPLETE CBC W/AUTO DIFF WBC: CPT

## 2021-04-20 RX ORDER — MORPHINE SULFATE 2 MG/ML
2 INJECTION, SOLUTION INTRAMUSCULAR; INTRAVENOUS
Status: DISCONTINUED | OUTPATIENT
Start: 2021-04-20 | End: 2021-04-29 | Stop reason: HOSPADM

## 2021-04-20 RX ADMIN — ONDANSETRON 4 MG: 2 INJECTION INTRAMUSCULAR; INTRAVENOUS at 08:08

## 2021-04-20 RX ADMIN — VARENICLINE TARTRATE 0.5 MG: 0.5 TABLET, FILM COATED ORAL at 17:30

## 2021-04-20 RX ADMIN — VARENICLINE TARTRATE 0.5 MG: 0.5 TABLET, FILM COATED ORAL at 08:11

## 2021-04-20 RX ADMIN — MEROPENEM 500 MG: 500 INJECTION, POWDER, FOR SOLUTION INTRAVENOUS at 00:22

## 2021-04-20 RX ADMIN — ALLOPURINOL 100 MG: 100 TABLET ORAL at 08:11

## 2021-04-20 RX ADMIN — MORPHINE SULFATE 2 MG: 2 INJECTION, SOLUTION INTRAMUSCULAR; INTRAVENOUS at 11:26

## 2021-04-20 RX ADMIN — MORPHINE SULFATE 1 MG: 2 INJECTION, SOLUTION INTRAMUSCULAR; INTRAVENOUS at 07:00

## 2021-04-20 RX ADMIN — METHADONE HYDROCHLORIDE 140 MG: 10 CONCENTRATE ORAL at 08:04

## 2021-04-20 RX ADMIN — GABAPENTIN 100 MG: 100 CAPSULE ORAL at 08:11

## 2021-04-20 RX ADMIN — LINEZOLID 600 MG: 600 INJECTION, SOLUTION INTRAVENOUS at 04:54

## 2021-04-20 RX ADMIN — MEROPENEM 500 MG: 500 INJECTION, POWDER, FOR SOLUTION INTRAVENOUS at 06:53

## 2021-04-20 RX ADMIN — LIDOCAINE: 40 CREAM TOPICAL at 11:26

## 2021-04-20 RX ADMIN — MORPHINE SULFATE 2 MG: 2 INJECTION, SOLUTION INTRAMUSCULAR; INTRAVENOUS at 20:53

## 2021-04-20 RX ADMIN — MEROPENEM 500 MG: 500 INJECTION, POWDER, FOR SOLUTION INTRAVENOUS at 11:26

## 2021-04-20 RX ADMIN — GABAPENTIN 100 MG: 100 CAPSULE ORAL at 17:23

## 2021-04-20 RX ADMIN — MEROPENEM 500 MG: 500 INJECTION, POWDER, FOR SOLUTION INTRAVENOUS at 17:23

## 2021-04-20 RX ADMIN — GABAPENTIN 100 MG: 100 CAPSULE ORAL at 21:52

## 2021-04-20 RX ADMIN — OXYCODONE HYDROCHLORIDE AND ACETAMINOPHEN 1 TABLET: 5; 325 TABLET ORAL at 08:11

## 2021-04-20 RX ADMIN — LEVOTHYROXINE SODIUM 112 MCG: 0.11 TABLET ORAL at 06:53

## 2021-04-20 RX ADMIN — LINEZOLID 600 MG: 600 INJECTION, SOLUTION INTRAVENOUS at 20:52

## 2021-04-20 RX ADMIN — TAMSULOSIN HYDROCHLORIDE 0.4 MG: 0.4 CAPSULE ORAL at 08:11

## 2021-04-20 RX ADMIN — OXYCODONE HYDROCHLORIDE AND ACETAMINOPHEN 1 TABLET: 5; 325 TABLET ORAL at 04:54

## 2021-04-20 RX ADMIN — OXYCODONE HYDROCHLORIDE AND ACETAMINOPHEN 1 TABLET: 5; 325 TABLET ORAL at 17:23

## 2021-04-20 RX ADMIN — MORPHINE SULFATE 1 MG: 2 INJECTION, SOLUTION INTRAMUSCULAR; INTRAVENOUS at 01:22

## 2021-04-20 RX ADMIN — OXYCODONE HYDROCHLORIDE AND ACETAMINOPHEN 1 TABLET: 5; 325 TABLET ORAL at 22:55

## 2021-04-20 NOTE — PROGRESS NOTES
Hospitalist Progress Note    NAME: Alfredito Tavera   :  1965   MRN:  [id number]       Assessment / Plan:    Sepsis POA: Temperature 101.4 at rate 108 WBC 25k  Infected extensive chronic  left lower extremity setting of pyoderma gangrenosum  Leukocytosis improving   current antibiotics including Zyvox, Flagyl and cefepime  Patient is listed allergy to vancomycin  Wound cultures  Culture result: Abnormal       Preliminary   LIGHT ENTEROCOCCUS FAECALIS    Culture result: Abnormal       Preliminary   LIGHT ESCHERICHIA COLI ** (EXTENDED SPECTRUM BETA LACTAMASE ) **    Culture result: Abnormal       Preliminary   LIGHT PSEUDOMONAS AERUGINOSA PIP/JUANITO SENSITIVITY TO FOLLOW    Culture result: Abnormal       Preliminary   LIGHT ALCALIGENES FAECALIS          Transition antibiotics to Zyvox and meropenem based on the sensitivities  Appreciated orthopedic, vascular surgery in general surgery consult  Recommended amputation but patient refused. He wants to continue the current treatment and likely biopsy  Spoke to the surgical team about biopsy and recommended that biopsy is a futile effort as his limb is not salvageable   His biopsy in  showed likely pyoderma gangrenosum  Pain seems uncontrolled with current med: methadone, IV morphine and Percocet. Will increase dose of IV morphine today  Consulting palliative to help with overwhelming symptoms and GOC discussion. He just wants antibiotics which are futile at this point due to severe necrosis of his lower extremity.   This was discussed again with pt today.         History of traumatic subarachnoid hemorrhage in 2020  Avoid NSAIDs and blood thinners  Continue SCDs for DVT prophylaxis  History of gout and osteoarthritis of both knees   Per chart review, patient was supposed to get knee replacement        # Chronic methadone use: Continue home dose methadone   # Hypothyroidism: Continue Synthroid  # HTN: Hold BP meds  # Left sided weakness w/ contracture. Chronic            Code Status: Full code  Surrogate Decision Maker:  Dong Hernandez Other Relative [phone]      DVT Prophylaxis: SCDs  GI Prophylaxis: not indicated     Baseline: Ambulatory      Discontinue IV fluids today     Subjective:     Chief Complaint / Reason for Physician Visit  Pt up in bed, appears uncomfortable due to significant leg pain not improving with current pain regimen. Discussed with RN events overnight. Review of Systems:  Symptom Y/N Comments  Symptom Y/N Comments   Fever/Chills n   Chest Pain n    Poor Appetite n   Edema n    Cough n   Abdominal Pain n    Sputum n   Joint Pain     SOB/BECK n   Pruritis/Rash     Nausea/vomit n   Tolerating PT/OT     Diarrhea n   Tolerating Diet     Constipation n   Other y Leg pain     Could NOT obtain due to:      Objective:     VITALS:   Last 24hrs VS reviewed since prior progress note. Most recent are:  Patient Vitals for the past 24 hrs:   Temp Pulse Resp BP SpO2   04/20/21 0800 98.2 °F (36.8 °C) 72 15 109/62 94 %   04/20/21 0415 98 °F (36.7 °C) 75 18 114/72 95 %   04/19/21 2326 98.3 °F (36.8 °C) 71 18 124/62 94 %   04/19/21 2043 98 °F (36.7 °C) 74 19 (!) 141/94 92 %   04/19/21 1630 98.1 °F (36.7 °C) 66 20 110/69 93 %   04/19/21 1043 98.2 °F (36.8 °C) 75 19 (!) 120/58 92 %       Intake/Output Summary (Last 24 hours) at 4/20/2021 0933  Last data filed at 4/19/2021 1857  Gross per 24 hour   Intake 400 ml   Output 950 ml   Net -550 ml      Face-to-face encounter was provided on April 19, 2021 with the following findings  PHYSICAL EXAM:  General: WD, WN. Alert, cooperative, uncomfortable in bed    EENT:  EOMI. Anicteric sclerae. MMM  Resp:  CTA bilaterally, no wheezing or rales.   No accessory muscle use  CV:  Regular  rhythm,  No edema  GI:  Soft, Non distended, Non tender.  +Bowel sounds  Neurologic:  Alert and oriented X 3, normal speech,   Psych:   Not anxious nor agitated  Skin:  Necrosis and gangrenous looking left lower extremity ulceration. Reviewed most current lab test results and cultures  YES  Reviewed most current radiology test results   YES  Review and summation of old records today    NO  Reviewed patient's current orders and MAR    YES  PMH/SH reviewed - no change compared to H&P  ________________________________________________________________________  Care Plan discussed with:    Comments   Patient x    Family      RN x    Care Manager     Consultant                        Multidiciplinary team rounds were held today with , nursing, pharmacist and clinical coordinator. Patient's plan of care was discussed; medications were reviewed and discharge planning was addressed. ________________________________________________________________________  Total NON critical care TIME:  35  Minutes    Total CRITICAL CARE TIME Spent:   Minutes non procedure based      Comments   >50% of visit spent in counseling and coordination of care     ________________________________________________________________________  Maricel Fofana MD     Procedures: see electronic medical records for all procedures/Xrays and details which were not copied into this note but were reviewed prior to creation of Plan. LABS:  I reviewed today's most current labs and imaging studies.   Pertinent labs include:  Recent Labs     04/20/21  0047 04/19/21 0446 04/18/21  0357   WBC 11.5* 11.2* 12.2*   HGB 10.5* 10.3* 10.2*   HCT 33.4* 32.4* 33.2*    203 218     Recent Labs     04/20/21  0047 04/19/21  0446 04/18/21  0357    136 135*   K 3.9 3.7 4.1    104 103   CO2 33* 28 27   * 118* 88   BUN 7 7 6   CREA 0.74 0.61* 0.54*   CA 8.5 8.5 8.9       Signed: Maricel Fofana MD

## 2021-04-20 NOTE — PROGRESS NOTES
End of Shift Note    Bedside shift change report given to Danielle Larsen (oncoming nurse) by Lilli Nina (offgoing nurse). Report included the following information SBAR, Kardex, Intake/Output, MAR and Recent Results    Shift worked:  [id number]     Shift summary and any significant changes:     Pt still requesting pain meds frequently due to L foot, still does not wish to have it amputated. Concerns for physician to address:  pain management     Zone phone for oncoming shift:          Activity:  Activity Level: Up with Assistance  Number times ambulated in hallways past shift: 0  Number of times OOB to chair past shift: 0    Cardiac:   Cardiac Monitoring: Yes      Cardiac Rhythm: Normal sinus rhythm    Access:   Current line(s): PIV     Genitourinary:   Urinary status: voiding    Respiratory:   O2 Device: None (Room air)  Chronic home O2 use?: NO  Incentive spirometer at bedside: NO     GI:  Last Bowel Movement Date: 04/18/21  Current diet:  DIET REGULAR  Passing flatus: YES  Tolerating current diet: YES       Pain Management:   Patient states pain is manageable on current regimen: NO    Skin:  Dejuan Score: 14  Interventions: float heels    Patient Safety:  Fall Score:  Total Score: 3  Interventions: gripper socks  High Fall Risk: Yes    Length of Stay:  Expected LOS: 3d 12h  Actual LOS: 5      Lilli Nina

## 2021-04-20 NOTE — PROGRESS NOTES
Infectious Disease progress      IMPRESSION:   · Sepsis  · Chronic LLE wounds with cellulitis, necrosis,  s/p recent treatment with 5/6 weeks of Zosyn IV at [facility]  · Polymicrobial WC- 4/16+ for -ESBL E. Coli, Pseudomonas, Enterococcus faecalis, Alcaligenes faecalis  · CT- LLE-negative for osteomyelitis   · H/o Pyoderma gangrenosum vs malignancy on pathology in 4/2019  · S/p intra lesional steroids by Dermatology  · Pt has declined amputation that has been recommended by multiple subspecialtues  · Chronic pain syndrome on methadone   · H/o hemorrhagic stroke with residual left sided weakness  · Seizure disorder  · Hypertension stable  · Hypothyroidism on synthroid       PLAN:      · Continue Meropenem Zyvox IV x 2 weeks   · D/w pt current wound cultures & presence of multiple organisms including ESBL  . Patient advised that continued antibiotic therapy would be futile and predispose to development of resistant organisms that would be more difficult to treat over time. Patient has infection that has spread further up his leg, in spite of being on antimicrobials. Patient at this time would probably require AKA. · D/w Dr Baljinder Pickett performing biopsy of wound to evaluate for possible development of malignancy. Dr Todd Austin recommends amputation . D/w patient that amputation is his best option, patient voiced understanding, is going to D/w daughter       Patient seen today. Awake ,coherent  Discussed CT lower extremity results with patient. Janeen Basilio, 63 y.o. male with PMHx significant for prior CVA, seizures, prior DVT, GERD, bladder cancer, chronic nonhealing   wound on his left lower extremity x 2 years , recommendation for BKA by multiple specialties, but pt continues to refuse. Pathology on 4/25/19 was + for Pyoderma gangrenosum vs malignancy. Patient  presented with concerns for worsening infection of the left lower extremity wound.  Patient reports that the wound has been present for two and half years . He had previously been on Zosyn iv  via PICC line for reported Pseudomonas and E. coli infection. He has been off antibiotic since the middle of March. Per ED note Caregiver reported that over the last 24 hours the leg had gotten worse with spreading redness above the area of the chronic wound. Patient had reported chronic pain in the area that is not significantly changed. No fevers at home. No chest pain, shortness of breath, abdominal pain, nausea, vomiting. Per Records pt was admitted at hospital at [place]. 2/9-2/26  [place] y.o. male who presented to ED for IV antibiotics he has a chronic left lower extremity wound and history of pyoderma gangrenosum. He has been getting steroid injections and reports that his leg has been doing better however apparently he had a culture done reasons unclear that showed ESBL and Pseudomonas and he was sent in for IV antibiotics. He denies any fevers or chills or worsening of his chronic wound. He has chronic pain which is unchanged. The dermatologist to send him into the hospital was not reachable tonight. ( Dr. Saman Isidro) patient has had this left lower extremity ulceration for over 2 years and maybe it has been worsening but he was a very poor historian. He had been taking steroids every 4 to 6 weeks and thought it was improving and scabbing over and not draining as much       Problems Managed During Hospitalization:  1. Infected Chronic LLE wounds with cellulitis with possible osteomyelitis of the lateral malleolus   Wounds have been present by report for 2 1/2 yrs.    -Chronic extensive LLE Pyoderma gangrenosum dx'ed 11/2020 with superimposed infected wounds with cellulitis and CT suggestive of lateral malleolus OM   -PG treated with intralesional steroid injections per Dermatology as OP Dr Saman Isidro  -Unclear if he ever received immunomodulator as his PG appears severe  -His cultures showed pseudomonas and ESBL ( ?colonization vs true infection)Currently on zosyn and ID following  -He could not tolerate MRI x 2 even after benzo for anxiety   -He was seen by vascular and Podiatry, offered BKA. PATIENT DECLINED AMPUTATION and would like to continue with his dermatologist and plastic  -He had debridements in the past but unclear if it helps or exacerbates PG( Pathergy)  -seen in consult by ID and has PICC placed for prolonged course of IV antibiotics per ID. 6 weeks of antibiotics   -confirmed his desire for no amputation. ID has discussed about possible outcomes including persistence or worsening of infection including sepsis and death despite long-term abx with his decision to not have amputation. He expresses understanding. Pt seen today . Appears drowsy . S/p Wound care . D/w Wound Care . Infected & necrotic wounds present. 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    FH: bladder cancer Z80.52    Left foot infection L08.9    Major depression A09.3    Uncomplicated opioid dependence (HCC) F11.20    Chronic obstructive pulmonary disease (HCC) J44.9    Chronic pain disorder G89.4    Hypokalemia E87.6    Seizure disorder ([facility]) G40.909    Tobacco abuse Z72.0    Foot ulcer ([facility]) L97.509    Nonadherence to medical treatment Z91.19    Sinus bradycardia R00.1    Gout M10.9    Hypothyroidism E03.9    Foot infection L08.9    Depression F32.9    Open wound, lower leg S81.809A    Pyoderma gangrenosum L88    Traumatic subarachnoid hemorrhage (HCC) S67.7N7E    Traumatic subarachnoid hemorrhage without loss of consciousness ([address] S06.6X0A    Infected wound T14. 8XXA, L08.9    Sepsis ([address] A41.9     Past Medical History:   Diagnosis Date    Aneurysm ([address]     (with stroke)    Bladder tumor     Cancer ([address]     bladder CA    Chronic pain     Family history of bladder cancer     GERD (gastroesophageal reflux disease)     Gout     History of vascular access device 04/25/2019    [facility] VAT 4 FR MIDLINE R Cephalic Limited access    History of vascular access device 04/26/2019    4 fr Midline right Cephalic midline access    Hypercholesterolemia     Hypertension     Lesion of bladder     Seizures ([address]     Stroke ([address] 2006    left sided weakness    Thromboembolus ([address]     left leg    Thyroid disease     TIA (transient ischemic attack) 2012      Family History   Problem Relation Age of Onset    Diabetes Father     Lung Disease Father     Diabetes Sister     Diabetes Brother     Cancer Paternal Grandfather         Bladder      Social History     Tobacco Use    Smoking status: Current Every Day Smoker     Packs/day: 0.50    Smokeless tobacco: Never Used    Tobacco comment: instructed not to smoke 24 hrs prior surgery   Substance Use Topics    Alcohol use: Yes     Alcohol/week: 6.0 standard drinks     Types: 6 Cans of beer per week     Comment: occasional     Past Surgical History:   Procedure Laterality Date    HX ORTHOPAEDIC      R arthroscopy    HX OTHER SURGICAL      x2 L foot    HX UROLOGICAL  04/20/2018    Cystoscopy, TURBT (greater than 5 cm resected) Dr. Myranda Leung, [facility].  KNEE ARTHROSCP HARV      left knee    TRANSURETHRAL RESEC BLADDER NECK  08/10/2018      Prior to Admission medications    Medication Sig Start Date End Date Taking?  Authorizing Provider colchicine 0.6 mg tablet Take 0.6 mg by mouth daily as needed for Gout or Pain. Indications: acute inflammation of the joints due to gout attack   Yes Provider, Historical   varenicline (Chantix Starting Month Box) 0.5 mg (11)- 1 mg (42) DsPk Take  by mouth. Take one tablet by mouth in morning with food for 3 days then increase to 1 tablet by mouth twice daily with food thereafter as directed   Indications: stop smoking   Yes Provider, Historical   tamsulosin (FLOMAX) 0.4 mg capsule Take 1 capsule by mouth daily after dinner  3/2/21  Yes Tamie Katz MD   lidocaine (XYLOCAINE) 4 % topical cream Apply  to affected area as needed for Pain (apply to left lower leg wounds during dressing changes). 12/22/20  Yes Marisa Kent MD   gabapentin (NEURONTIN) 100 mg capsule Take 100 mg by mouth three (3) times daily. Yes Provider, Historical   levothyroxine (SYNTHROID) 112 mcg tablet Take 112 mcg by mouth Daily (before breakfast). Yes Provider, Historical   lisinopriL (PRINIVIL, ZESTRIL) 20 mg tablet Take 20 mg by mouth daily. Yes Provider, Historical   naproxen sodium (Aleve) 220 mg tablet Take 220 mg by mouth three (3) times daily as needed. Yes Provider, Historical   aspirin 81 mg chewable tablet Take 81 mg by mouth daily. Yes Provider, Historical   pantoprazole (PROTONIX) 40 mg tablet Take 40 mg by mouth daily as needed. 7/17/19  Yes Provider, Historical   allopurinol (ZYLOPRIM) 100 mg tablet Take 1 Tab by mouth daily. Indications: treatment to prevent acute gout attack 5/24/19  Yes Trisha Maguire NP   senna-docusate (DARELL-COLACE) 8.6-50 mg per tablet Take 1 Tab by mouth daily as needed. Indications: constipation   Yes Provider, Historical   methadone (DOLOPHINE) 10 mg/mL solution Take 140 mg by mouth daily. Indications: excessive pain   Yes Provider, Historical   melatonin 3 mg tablet Take 1 Tab by mouth nightly as needed.  10/12/18  Yes Bryn Apple MD     Allergies   Allergen Reactions    Sulfamethoxazole-Trimethoprim Rash     L eye and L hand    Ciprofloxacin Hives     Per pcp records    Codeine Hives     Tolerates dilaudid, oxycodone    Cymbalta [Duloxetine] Other (comments)     Confusion and memory loss    Lyrica [Pregabalin] Hives    Sulfa (Sulfonamide Antibiotics) Rash    Ultram [Tramadol] Hives    Vancomycin Shortness of Breath    Zosyn [Piperacillin-Tazobactam] Rash        Review of Systems:  A comprehensive review of systems was negative except for that written in the History of Present Illness. 10 point review of systems obtained . All other systems negative    Objective:   Blood pressure (!) 146/88, pulse 71, temperature 98.6 °F (37 °C), resp. rate 17, height 5' 6\" (1.676 m), weight 240 lb (108.9 kg), SpO2 93 %.   Temp (24hrs), Av.2 °F (36.8 °C), Min:98 °F (36.7 °C), Max:98.6 °F (37 °C)    Current Facility-Administered Medications   Medication Dose Route Frequency    morphine injection 2 mg  2 mg IntraVENous Q4H PRN    meropenem (MERREM) 500 mg in 0.9% sodium chloride (MBP/ADV) 50 mL MBP  500 mg IntraVENous Q6H    acetaminophen (TYLENOL) tablet 650 mg  650 mg Oral Q6H PRN    ondansetron (ZOFRAN) injection 4 mg  4 mg IntraVENous Q6H PRN    polyethylene glycol (MIRALAX) packet 17 g  17 g Oral DAILY    levothyroxine (SYNTHROID) tablet 112 mcg  112 mcg Oral ACB    linezolid in dextrose 5% (ZYVOX) IVPB premix in D5W 600 mg  600 mg IntraVENous Q12H    lidocaine (XYLOCAINE) 4 % cream   Topical DAILY    sodium chloride (NS) flush 5-10 mL  5-10 mL IntraVENous PRN    allopurinoL (ZYLOPRIM) tablet 100 mg  100 mg Oral DAILY    gabapentin (NEURONTIN) capsule 100 mg  100 mg Oral TID    melatonin tablet 3 mg  3 mg Oral QHS PRN    tamsulosin (FLOMAX) capsule 0.4 mg  0.4 mg Oral DAILY    methadone (DOLOPHINE) 10 mg/mL concentrated solution 140 mg  140 mg Oral DAILY    varenicline (CHANTIX) 0.5 mg tablet 0.5 mg  0.5 mg Oral BIDPC    Followed by   Alexandru Arredondo ON 2021] varenicline (CHANTIX) 0.5 mg tablet 1 mg  1 mg Oral BIDPC    oxyCODONE-acetaminophen (PERCOCET) 5-325 mg per tablet 1 Tab  1 Tab Oral Q4H PRN        Exam:    General:  Awake, drowsy ,cooperative,    Eyes:  Sclera anicteric. Pupils equally round and reactive to light. Mouth/Throat: Mucous membranes , oral pharynx mildly dry   Neck: Supple   Lungs:   Reduced auscultation bilaterally, good effort   CV:  Regular rate and rhythm,no murmur, click, rub or gallop   Abdomen:   Soft, non-tender.  bowel sounds normal. non-distended   Extremities: No  edema   Skin: Skin color, texture, turgor normal. no acute rash or lesions   Lymph nodes: Cervical and supraclavicular normal   Musculoskeletal: LLE dressing +   Lines/Devices:  Intact, no erythema, drainage or tenderness   Psych: Awake  and oriented, normal mood affect        Data Review:   CBC:   Recent Labs     04/20/21 0047 04/19/21 0446 04/18/21  0357   WBC 11.5* 11.2* 12.2*   RBC 3.74* 3.64* 3.68*   HGB 10.5* 10.3* 10.2*   HCT 33.4* 32.4* 33.2*    203 218   GRANS 73 69 76*   LYMPH 10* 11* 7*   EOS 6 7 6     CMP:   Recent Labs     04/20/21 0047 04/19/21 0446 04/18/21  0357   * 118* 88    136 135*   K 3.9 3.7 4.1    104 103   CO2 33* 28 27   BUN 7 7 6   CREA 0.74 0.61* 0.54*   CA 8.5 8.5 8.9   AGAP 1* 4* 5   BUCR 9* 11* 11*       Lab Results   Component Value Date/Time    Culture result: NO ANAEROBES ISOLATED 04/16/2021 02:36 PM    Culture result: NO FUNGUS ISOLATED 3 DAYS 04/16/2021 02:36 PM    Culture result: LIGHT ENTEROCOCCUS FAECALIS (A) 04/16/2021 02:35 PM    Culture result: (A) 04/16/2021 02:35 PM     LIGHT ESCHERICHIA COLI ** (EXTENDED SPECTRUM BETA LACTAMASE ) **    Culture result: (A) 04/16/2021 02:35 PM     LIGHT PSEUDOMONAS AERUGINOSA PIP/JUANITO = 19mm = INTERMEDIATE    Culture result: LIGHT ALCALIGENES FAECALIS (A) 04/16/2021 02:35 PM          XR Results (most recent):  Results from Hospital Encounter encounter on 04/15/21   XR ANKLE LT AP/LAT    Narrative EXAM: XR ANKLE LT AP/LAT    INDICATION: Trauma. COMPARISON: None. FINDINGS: Two views of the left ankle demonstrate diffuse soft tissue swelling  and osteopenia and mild DJD, no definite fracture. Impression  impression: No acute bone findings suspected. ICD-10-CM ICD-9-CM    1. Sepsis, due to unspecified organism, unspecified whether acute organ dysfunction present ([facility]  A41.9 038.9      995.91    2. Cellulitis of left lower extremity  L03.116 682.6    3. Wound of left foot  S91.302A 892.0    4. History of bladder cancer  Z85.51 V10.51    5. Multiple open wounds of lower leg, left, initial encounter  S81.802A 894.0    6. Subarachnoid hemorrhage (HCC)  I60.9 430    7. Infected wound  T14. 8XXA 958.3     L08.9     8. Brain aneurysm  I67.1 437.3    9. Cerebral hemorrhage with hemiparesis (HCC)  I61.9 431     G81.90 342.90    10. Acquired hypothyroidism  E03.9 244. 9      Zyvox IV  Meropenem IV      Antibiotic History  Current Regimen of Each Antimicrobial:  Cefepime 2 g IV Q8H (Start Date 4/15; Day # 4)  Metronidazole 500 mg IV Q8H (Start Date 4/15; Day #4)  Linezolid 600 mg PO BID (Start Date 4/15; Day 4)     Previous Antimicrobial Therapy:  Zosyn x1  Vancomycin - pharmacy to dose        S/p Zosyn IV - x 5 weeks , last dose 3/21/21  I have discussed the diagnosis with the patient and the intended plan as seen in the above orders. I have discussed medication side effects and warnings with the patient as well.     Reviewed test results at length with patient    Signed By: Danica Castro MD FACP

## 2021-04-20 NOTE — PROGRESS NOTES
Attempted follow up visit on [facility] for further pastoral support. Patient indicated he is not feeling well this afternoon and declined visit at this time. Advised patient that if he would like a  to visit when he is feeling better he can call the pastoral care office and we will be happy to return for a visit.        DYLAN Mccray, [address] [facility], Staff [address] [place] [address]    Sheryl Antonio Paging Service  [phone]

## 2021-04-20 NOTE — CONSULTS
Palliative Medicine Consult  Ashish: [phone])    Patient Name: Marah Rowell  YOB: 1965    Date of Initial Consult: 4/20/21  Reason for Consult: overwhelming symptoms  Requesting Provider: Lennox Kleine, MD   Primary Care Physician: None     SUMMARY:   Marah Rowell is a 63 y.o. with a past history of CVA, seizures, prior DVT, GERD, bladder cancer, chronic nonhealing wound on his left lower extremity x 2 years, smoker, , Methadone treatment at [facility]. for chronic pain, who was admitted on 4/15/2021 from home with a diagnosis of wound infection. Current medical issues leading to Palliative Medicine involvement include: help with pain management. Per chart review, first document of LLE cellulitis in August 2014, with progression over the years to now. He has been admitted to multiple different hospitals including [facility] [facility], [facility], [address] , [facility], [facility], he has been seen by multiple surgeons, plastic surgeon, all who have recommended amputation. Pt with significant history of no-show and appt rescheduling with his dermatologist.  See Leonel Ahmadi note 4/16/21 that pt was dismissed from his practice for extreme non-compliance, narcotic seeking, and threatening behavior. Per infectious disease , she has advised pt that continued antibiotic therapy would be futile and predispose to development of resistant organisms. Psychosocial: Pt has [facility] for his insurance. Pt lives [facility] handicap [facility] [place] with his friend. Pt is wheelchair bound and requires assistance for his ADL/IADLs. Pt's friend drives him to his appointments. AMD on file, primary surrogate decision maker Ricky De La Cruz ([phone])   PALLIATIVE DIAGNOSES:   1. Chronic pain: methadone 140mg daily in outpatient setting, gabapentin 100mg tid  2.  Long-term current use of methadone for pain control  3. Opioid dependence  4. Chronic non-healing wound LLE  5. Depression  6. Physical debility      PLAN:   1. Prior to visit, I completed an extensive review of patient's medical records, including medical documentation, vital signs, MARs, and results of various labs and other diagnostics. I also spoke with patient's attending , ethicist Saul Lopez,  and infectious disease . 2. Met with pt, obtained history, discussed his current pain is chronic pain for which he is getting the methadone 140mg daily and Gabapentin 100mg tid. I would not recommend adding more opiates at this time because we cannot \"fix\" his leg with medical interventions, he needs an amputation, otherwise the infection will continue to spread further up his leg which means that the recommended amputation will also go higher up his leg overtime. I also told him that he will most likely die from sepsis as the microorganisms become resistant to the abx. I empathized with him about having to lose his leg, but reasoned that it would be better to lose his leg and continue to live and have more time with his family. Pt agreed, stating he wants to call his children and let them know that he is willing to meet with the surgeon to discuss amputation. He asked that I call his caregiver Letty Curtis. 3. 30 min discussion with Letty Curtis, who had many questions, ie, why won't we do another biopsy, if this is PG, why aren't we treating with immunosuppressants, why are some doctors saying pt has osteomyelitis when the scans indicate he does not, if he has the amputation that won't guarantee the infection won't occur in the stump. I explained that with active infection immunosuppression would kill him, no surgery or procedure or medication comes with guarantees. The rest of the questions she needs to address with the surgeon.     4. I recommend stopping the oxycodone and continuing the IV morphine without escalating dose to prevent escalating his dependence needs. If he decides to have the amputation I will be happy to assist with post-op pain. 5. Initial consult note routed to primary continuity provider and/or primary health care team members  6. Communicated plan of care with: Palliative ID[facility] Team     GOALS OF CARE / TREATMENT PREFERENCES:     GOALS OF CARE:  Pain control       TREATMENT PREFERENCES:     Code Status: Full Code    Primary Decision MakeHerman Cano - Other Relative - [phone]  Advance Care Planning 4/15/2021   Patient's Healthcare Decision Maker is: -   Primary Decision Maker Name -   Primary Decision Maker Phone Number -   Primary Decision Maker Relationship to Patient -   Confirm Advance Directive Yes, not on file   Patient Would Like to Complete Advance Directive -                         HISTORY:     History obtained from: chart, patient    CHIEF COMPLAINT: pain LLE    HPI/SUBJECTIVE:    The patient is:   [x] Verbal and participatory  [] Non-participatory due to:     4/15: BIBA with c/o worsening infection of LLE; wound has been present for 2.5 years as a result of pyoderma gangrenosum. Pt has been in and out of various hospitals for IV abx, recommendations have been amputation which pt has refused.      Clinical Pain Assessment (nonverbal scale for severity on nonverbal patients):   Clinical Pain Assessment  Severity: 8  Location: left foot  Character: \"i can't describe it\"  Duration: years  Effect: movement  Frequency: constant          Duration: for how long has pt been experiencing pain (e.g., 2 days, 1 month, years)  Frequency: how often pain is an issue (e.g., several times per day, once every few days, constant)     FUNCTIONAL ASSESSMENT:     Palliative Performance Scale (PPS):          PSYCHOSOCIAL/SPIRITUAL SCREENING:     Palliative IDT has assessed this patient for cultural preferences / practices and a referral made as appropriate to needs (Cultural Services, Patient Advocacy, Ethics, etc.)    Any spiritual / Yazidism concerns:  [] Yes /  [x] No    Caregiver Burnout:  [] Yes /  [x] No /  [] No Caregiver Present      Anticipatory grief assessment:   [x] Normal  / [] Maladaptive       ESAS Anxiety: Anxiety: 0    ESAS Depression: Depression: 4        REVIEW OF SYSTEMS:     Positive and pertinent negative findings in ROS are noted above in HPI. The following systems were [x] reviewed / [] unable to be reviewed as noted in HPI  Other findings are noted below. Systems: constitutional, ears/nose/mouth/throat, respiratory, gastrointestinal, genitourinary, musculoskeletal, integumentary, neurologic, psychiatric, endocrine. Positive findings noted below. Modified ESAS Completed by: provider   Fatigue: 4 Drowsiness: 0   Depression: 4 Pain: 8   Anxiety: 0 Nausea: 0   Anorexia: 0 Dyspnea: 0     Constipation: No     Stool Occurrence(s): 1        PHYSICAL EXAM:     From RN flowsheet:  Wt Readings from Last 3 Encounters:   04/15/21 240 lb (108.9 kg)   04/15/21 240 lb (108.9 kg)   12/11/20 237 lb 14 oz (107.9 kg)     Blood pressure (!) 146/88, pulse 71, temperature 98.6 °F (37 °C), resp. rate 17, height 5' 6\" (1.676 m), weight 240 lb (108.9 kg), SpO2 93 %.     Pain Scale 1: Numeric (0 - 10)  Pain Intensity 1: 5     Pain Location 1: Foot  Pain Orientation 1: Left  Pain Description 1: Aching, Burning, Constant  Pain Intervention(s) 1: Emotional support  Last bowel movement, if known:     Constitutional: obese, AAO x3, unable to tell me what he takes at home for pain other than methadone and ibuprofen  Eyes: pupils equal, anicteric  ENMT: no nasal discharge, moist mucous membranes  Cardiovascular: regular rhythm, distal pulses intact  Respiratory: breathing not labored, symmetric  Gastrointestinal: soft non-tender, +bowel sounds  Musculoskeletal: no deformity, no tenderness to palpation  Skin: warm, dry, LLE bandaged  Neurologic: following commands, moving all extremities  Psychiatric: full affect, no hallucinations          HISTORY:     Active Problems:    Infected wound (4/15/2021)      Sepsis ([address] (4/15/2021)      Past Medical History:   Diagnosis Date    Aneurysm ([address]     (with stroke)    Bladder tumor     Cancer ([address]     bladder CA    Chronic pain     Family history of bladder cancer     GERD (gastroesophageal reflux disease)     Gout     History of vascular access device 04/25/2019    [facility] VAT 4 FR MIDLINE R Cephalic Limited access    History of vascular access device 04/26/2019    4 fr Midline right Cephalic midline access    Hypercholesterolemia     Hypertension     Lesion of bladder     Seizures ([address]     Stroke ([address] 2006    left sided weakness    Thromboembolus ([address]     left leg    Thyroid disease     TIA (transient ischemic attack) 2012      Past Surgical History:   Procedure Laterality Date    HX ORTHOPAEDIC      R arthroscopy    HX OTHER SURGICAL      x2 L foot    HX UROLOGICAL  04/20/2018    Cystoscopy, TURBT (greater than 5 cm resected) Dr. Trevon Sorto, [facility].  KNEE ARTHROSCP HARV      left knee    TRANSURETHRAL RESEC BLADDER NECK  08/10/2018      Family History   Problem Relation Age of Onset    Diabetes Father     Lung Disease Father     Diabetes Sister     Diabetes Brother     Cancer Paternal Grandfather         Bladder      History reviewed, no pertinent family history. Social History     Tobacco Use    Smoking status: Current Every Day Smoker     Packs/day: 0.50    Smokeless tobacco: Never Used    Tobacco comment: instructed not to smoke 24 hrs prior surgery   Substance Use Topics    Alcohol use:  Yes     Alcohol/week: 6.0 standard drinks     Types: 6 Cans of beer per week     Comment: occasional     Allergies   Allergen Reactions    Sulfamethoxazole-Trimethoprim Rash     L eye and L hand    Ciprofloxacin Hives     Per pcp records    Codeine Hives     Tolerates dilaudid, oxycodone    Cymbalta [Duloxetine] Other (comments)     Confusion and memory loss    Lyrica [Pregabalin] Hives    Sulfa (Sulfonamide Antibiotics) Rash    Ultram [Tramadol] Hives    Vancomycin Shortness of Breath    Zosyn [Piperacillin-Tazobactam] Rash      Current Facility-Administered Medications   Medication Dose Route Frequency    morphine injection 2 mg  2 mg IntraVENous Q4H PRN    meropenem (MERREM) 500 mg in 0.9% sodium chloride (MBP/ADV) 50 mL MBP  500 mg IntraVENous Q6H    acetaminophen (TYLENOL) tablet 650 mg  650 mg Oral Q6H PRN    ondansetron (ZOFRAN) injection 4 mg  4 mg IntraVENous Q6H PRN    polyethylene glycol (MIRALAX) packet 17 g  17 g Oral DAILY    levothyroxine (SYNTHROID) tablet 112 mcg  112 mcg Oral ACB    linezolid in dextrose 5% (ZYVOX) IVPB premix in D5W 600 mg  600 mg IntraVENous Q12H    lidocaine (XYLOCAINE) 4 % cream   Topical DAILY    sodium chloride (NS) flush 5-10 mL  5-10 mL IntraVENous PRN    allopurinoL (ZYLOPRIM) tablet 100 mg  100 mg Oral DAILY    gabapentin (NEURONTIN) capsule 100 mg  100 mg Oral TID    melatonin tablet 3 mg  3 mg Oral QHS PRN    tamsulosin (FLOMAX) capsule 0.4 mg  0.4 mg Oral DAILY    methadone (DOLOPHINE) 10 mg/mL concentrated solution 140 mg  140 mg Oral DAILY    varenicline (CHANTIX) 0.5 mg tablet 0.5 mg  0.5 mg Oral BIDPC    Followed by   Ashanti Call ON 4/23/2021] varenicline (CHANTIX) 0.5 mg tablet 1 mg  1 mg Oral BIDPC    oxyCODONE-acetaminophen (PERCOCET) 5-325 mg per tablet 1 Tab  1 Tab Oral Q4H PRN          LAB AND IMAGING FINDINGS:     Lab Results   Component Value Date/Time    WBC 11.5 (H) 04/20/2021 12:47 AM    HGB 10.5 (L) 04/20/2021 12:47 AM    PLATELET 798 07/63/0881 12:47 AM     Lab Results   Component Value Date/Time    Sodium 136 04/20/2021 12:47 AM    Potassium 3.9 04/20/2021 12:47 AM    Chloride 102 04/20/2021 12:47 AM    CO2 33 (H) 04/20/2021 12:47 AM    BUN 7 04/20/2021 12:47 AM    Creatinine 0.74 04/20/2021 12:47 AM    Calcium 8.5 04/20/2021 12:47 AM    Magnesium 1.8 10/23/2020 02:27 AM    Phosphorus 4.4 10/23/2020 02:27 AM      Lab Results   Component Value Date/Time    Alk. phosphatase 120 (H) 04/15/2021 07:04 AM    Protein, total 6.3 (L) 04/15/2021 07:04 AM    Albumin 2.8 (L) 04/15/2021 07:04 AM    Globulin 3.5 04/15/2021 07:04 AM     Lab Results   Component Value Date/Time    INR 1.1 12/21/2017 11:28 AM    Prothrombin time 11.4 (H) 12/21/2017 11:28 AM    aPTT 29.3 12/21/2017 11:28 AM      No results found for: IRON, FE, TIBC, IBCT, PSAT, FERR   No results found for: PH, PCO2, PO2  No components found for: [place]   Lab Results   Component Value Date/Time    CK 60 04/25/2019 05:10 AM    CK - MB 1.1 12/21/2017 11:28 AM                Total time: 120  Counseling / coordination time, spent as noted above: 90  > 50% counseling / coordination?: y    Prolonged service was provided for  []30 min   []75 min in face to face time in the presence of the patient, spent as noted above. Time Start:   Time End:   Note: this can only be billed with 69667 (initial) or 88243 (follow up). If multiple start / stop times, list each separately.

## 2021-04-21 LAB
ANION GAP SERPL CALC-SCNC: 3 MMOL/L (ref 5–15)
BACTERIA SPEC CULT: NORMAL
BACTERIA SPEC CULT: NORMAL
BASOPHILS # BLD: 0.1 K/UL (ref 0–0.1)
BASOPHILS NFR BLD: 1 % (ref 0–1)
BUN SERPL-MCNC: 7 MG/DL (ref 6–20)
BUN/CREAT SERPL: 16 (ref 12–20)
CALCIUM SERPL-MCNC: 8.5 MG/DL (ref 8.5–10.1)
CHLORIDE SERPL-SCNC: 102 MMOL/L (ref 97–108)
CO2 SERPL-SCNC: 29 MMOL/L (ref 21–32)
CREAT SERPL-MCNC: 0.44 MG/DL (ref 0.7–1.3)
DIFFERENTIAL METHOD BLD: ABNORMAL
EOSINOPHIL # BLD: 1 K/UL (ref 0–0.4)
EOSINOPHIL NFR BLD: 9 % (ref 0–7)
ERYTHROCYTE [DISTWIDTH] IN BLOOD BY AUTOMATED COUNT: 17.2 % (ref 11.5–14.5)
GLUCOSE SERPL-MCNC: 88 MG/DL (ref 65–100)
HCT VFR BLD AUTO: 33.3 % (ref 36.6–50.3)
HGB BLD-MCNC: 10.4 G/DL (ref 12.1–17)
IMM GRANULOCYTES # BLD AUTO: 0.2 K/UL (ref 0–0.04)
IMM GRANULOCYTES NFR BLD AUTO: 2 % (ref 0–0.5)
LYMPHOCYTES # BLD: 1.3 K/UL (ref 0.8–3.5)
LYMPHOCYTES NFR BLD: 12 % (ref 12–49)
MCH RBC QN AUTO: 28.1 PG (ref 26–34)
MCHC RBC AUTO-ENTMCNC: 31.2 G/DL (ref 30–36.5)
MCV RBC AUTO: 90 FL (ref 80–99)
MONOCYTES # BLD: 1.1 K/UL (ref 0–1)
MONOCYTES NFR BLD: 10 % (ref 5–13)
NEUTS SEG # BLD: 7 K/UL (ref 1.8–8)
NEUTS SEG NFR BLD: 66 % (ref 32–75)
NRBC # BLD: 0 K/UL (ref 0–0.01)
NRBC BLD-RTO: 0 PER 100 WBC
PLATELET # BLD AUTO: 208 K/UL (ref 150–400)
PMV BLD AUTO: 9.1 FL (ref 8.9–12.9)
POTASSIUM SERPL-SCNC: 3.9 MMOL/L (ref 3.5–5.1)
RBC # BLD AUTO: 3.7 M/UL (ref 4.1–5.7)
RBC MORPH BLD: ABNORMAL
SERVICE CMNT-IMP: NORMAL
SERVICE CMNT-IMP: NORMAL
SODIUM SERPL-SCNC: 134 MMOL/L (ref 136–145)
WBC # BLD AUTO: 10.7 K/UL (ref 4.1–11.1)
WBC MORPH BLD: ABNORMAL

## 2021-04-21 PROCEDURE — 2709999900 HC NON-CHARGEABLE SUPPLY

## 2021-04-21 PROCEDURE — 99223 1ST HOSP IP/OBS HIGH 75: CPT | Performed by: ORTHOPAEDIC SURGERY

## 2021-04-21 PROCEDURE — 74011250637 HC RX REV CODE- 250/637: Performed by: INTERNAL MEDICINE

## 2021-04-21 PROCEDURE — 80048 BASIC METABOLIC PNL TOTAL CA: CPT

## 2021-04-21 PROCEDURE — 36415 COLL VENOUS BLD VENIPUNCTURE: CPT

## 2021-04-21 PROCEDURE — 85025 COMPLETE CBC W/AUTO DIFF WBC: CPT

## 2021-04-21 PROCEDURE — 74011000258 HC RX REV CODE- 258: Performed by: INTERNAL MEDICINE

## 2021-04-21 PROCEDURE — 74011250636 HC RX REV CODE- 250/636: Performed by: INTERNAL MEDICINE

## 2021-04-21 PROCEDURE — 74011000250 HC RX REV CODE- 250: Performed by: INTERNAL MEDICINE

## 2021-04-21 PROCEDURE — 65660000000 HC RM CCU STEPDOWN

## 2021-04-21 RX ORDER — COLCHICINE 0.6 MG/1
0.6 TABLET ORAL 3 TIMES DAILY
Status: COMPLETED | OUTPATIENT
Start: 2021-04-21 | End: 2021-04-21

## 2021-04-21 RX ORDER — LINEZOLID 2 MG/ML
600 INJECTION, SOLUTION INTRAVENOUS EVERY 12 HOURS
Status: DISCONTINUED | OUTPATIENT
Start: 2021-04-21 | End: 2021-04-29 | Stop reason: HOSPADM

## 2021-04-21 RX ORDER — COLCHICINE 0.6 MG/1
0.6 TABLET ORAL DAILY
Status: DISCONTINUED | OUTPATIENT
Start: 2021-04-22 | End: 2021-04-23

## 2021-04-21 RX ADMIN — OXYCODONE HYDROCHLORIDE AND ACETAMINOPHEN 1 TABLET: 5; 325 TABLET ORAL at 08:05

## 2021-04-21 RX ADMIN — LIDOCAINE: 40 CREAM TOPICAL at 09:17

## 2021-04-21 RX ADMIN — GABAPENTIN 100 MG: 100 CAPSULE ORAL at 21:30

## 2021-04-21 RX ADMIN — TAMSULOSIN HYDROCHLORIDE 0.4 MG: 0.4 CAPSULE ORAL at 08:05

## 2021-04-21 RX ADMIN — LEVOTHYROXINE SODIUM 112 MCG: 0.11 TABLET ORAL at 06:08

## 2021-04-21 RX ADMIN — MEROPENEM 500 MG: 500 INJECTION, POWDER, FOR SOLUTION INTRAVENOUS at 11:25

## 2021-04-21 RX ADMIN — Medication 10 ML: at 21:32

## 2021-04-21 RX ADMIN — MORPHINE SULFATE 2 MG: 2 INJECTION, SOLUTION INTRAMUSCULAR; INTRAVENOUS at 04:49

## 2021-04-21 RX ADMIN — MORPHINE SULFATE 2 MG: 2 INJECTION, SOLUTION INTRAMUSCULAR; INTRAVENOUS at 00:54

## 2021-04-21 RX ADMIN — POLYETHYLENE GLYCOL 3350 17 G: 17 POWDER, FOR SOLUTION ORAL at 08:04

## 2021-04-21 RX ADMIN — MEROPENEM 500 MG: 500 INJECTION, POWDER, FOR SOLUTION INTRAVENOUS at 23:01

## 2021-04-21 RX ADMIN — ACETAMINOPHEN 650 MG: 325 TABLET ORAL at 23:01

## 2021-04-21 RX ADMIN — MORPHINE SULFATE 2 MG: 2 INJECTION, SOLUTION INTRAMUSCULAR; INTRAVENOUS at 17:24

## 2021-04-21 RX ADMIN — METHADONE HYDROCHLORIDE 140 MG: 10 CONCENTRATE ORAL at 08:07

## 2021-04-21 RX ADMIN — MEROPENEM 500 MG: 500 INJECTION, POWDER, FOR SOLUTION INTRAVENOUS at 00:52

## 2021-04-21 RX ADMIN — GABAPENTIN 100 MG: 100 CAPSULE ORAL at 17:22

## 2021-04-21 RX ADMIN — MORPHINE SULFATE 2 MG: 2 INJECTION, SOLUTION INTRAMUSCULAR; INTRAVENOUS at 11:35

## 2021-04-21 RX ADMIN — MEROPENEM 500 MG: 500 INJECTION, POWDER, FOR SOLUTION INTRAVENOUS at 17:18

## 2021-04-21 RX ADMIN — COLCHICINE 0.6 MG: 0.6 TABLET, FILM COATED ORAL at 17:17

## 2021-04-21 RX ADMIN — VARENICLINE TARTRATE 0.5 MG: 0.5 TABLET, FILM COATED ORAL at 17:18

## 2021-04-21 RX ADMIN — MORPHINE SULFATE 2 MG: 2 INJECTION, SOLUTION INTRAMUSCULAR; INTRAVENOUS at 21:31

## 2021-04-21 RX ADMIN — LINEZOLID 600 MG: 600 INJECTION, SOLUTION INTRAVENOUS at 21:33

## 2021-04-21 RX ADMIN — VARENICLINE TARTRATE 0.5 MG: 0.5 TABLET, FILM COATED ORAL at 08:05

## 2021-04-21 RX ADMIN — COLCHICINE 0.6 MG: 0.6 TABLET, FILM COATED ORAL at 21:30

## 2021-04-21 RX ADMIN — LINEZOLID 600 MG: 600 INJECTION, SOLUTION INTRAVENOUS at 09:17

## 2021-04-21 RX ADMIN — GABAPENTIN 100 MG: 100 CAPSULE ORAL at 08:05

## 2021-04-21 RX ADMIN — MEROPENEM 500 MG: 500 INJECTION, POWDER, FOR SOLUTION INTRAVENOUS at 06:08

## 2021-04-21 RX ADMIN — COLCHICINE 0.6 MG: 0.6 TABLET, FILM COATED ORAL at 09:17

## 2021-04-21 RX ADMIN — ONDANSETRON 4 MG: 2 INJECTION INTRAMUSCULAR; INTRAVENOUS at 17:34

## 2021-04-21 RX ADMIN — OXYCODONE HYDROCHLORIDE AND ACETAMINOPHEN 1 TABLET: 5; 325 TABLET ORAL at 03:25

## 2021-04-21 NOTE — PROGRESS NOTES
End of Shift Note    Bedside shift change report given to Radha (oncoming nurse) by Gray Alejo RN (offgoing nurse). Report included the following information Kardex, MAR and Recent Results    Shift worked:  7-7p     Shift summary and any significant changes:     no significant changes     Concerns for physician to address:     Zone phone for oncoming shift:   [phone]       Activity:  Activity Level: Bed Rest  Number times ambulated in hallways past shift: 0  Number of times OOB to chair past shift: 0    Cardiac:   Cardiac Monitoring: Yes      Cardiac Rhythm: Normal sinus rhythm, Sinus tachycardia    Access:   Current line(s): PIV     Genitourinary:   Urinary status: voiding    Respiratory:   O2 Device: None (Room air)  Chronic home O2 use?: NO  Incentive spirometer at bedside:      GI:  Last Bowel Movement Date: 04/19/21  Current diet:  DIET REGULAR  Passing flatus: Tolerating current diet: YES       Pain Management:   Patient states pain is manageable on current regimen:     Skin:  Dejuan Score: 14  Interventions:     Patient Safety:  Fall Score:  Total Score: 4  Interventions: gripper socks and pt to call before getting OOB  High Fall Risk: Yes    Length of Stay:  Expected LOS: 3d 12h  Actual LOS: [address]. Ben Young, RN

## 2021-04-21 NOTE — PROGRESS NOTES
End of Shift Note    Bedside shift change report given to Tanisha Murry (oncoming nurse) by Shanita Dyer (offgoing nurse). Report included the following information SBAR, Kardex, Intake/Output, MAR, Accordion and Recent Results    Shift worked:  7-7p     Shift summary and any significant changes:     Wound care performed. Ortho surgery saw pt. Pain medication reduced.       Concerns for physician to address:  none     Zone phone for oncoming shift:   [phone]

## 2021-04-21 NOTE — PROGRESS NOTES
Physical Therapy Screening:  Services are indicated and therapy order is required. If decides to go through with surgery. [facility] based on results obtained during the nursing admission assessment. The patients chart was reviewed and the patient is appropriate for a skilled therapy evaluation. Please order a consult for physical therapy if you are in agreement and would like an evaluation to be completed. Thank you.     Maura Shoemaker, PT, DPT

## 2021-04-21 NOTE — PROGRESS NOTES
Hospitalist Progress Note    NAME: Severa Harlem   :  1965   MRN:  [id number]       Assessment / Plan:    Sepsis POA: Temperature 101.4 at rate 108 WBC 25k  Infected extensive chronic  left lower extremity setting of pyoderma gangrenosum  Leukocytosis improving   current antibiotics including Zyvox, and meropenum  Patient is listed allergy to vancomycin  Wound cultures  Culture result: Abnormal       Preliminary   LIGHT ENTEROCOCCUS FAECALIS    Culture result: Abnormal       Preliminary   LIGHT ESCHERICHIA COLI ** (EXTENDED SPECTRUM BETA LACTAMASE ) **    Culture result: Abnormal       Preliminary   LIGHT PSEUDOMONAS AERUGINOSA PIP/JUANITO SENSITIVITY TO FOLLOW    Culture result: Abnormal       Preliminary   LIGHT ALCALIGENES FAECALIS         His biopsy in  showed likely pyoderma gangrenosum  Transition antibiotics to Zyvox and meropenem based on the sensitivities. He wants to continue the current treatment and likely biopsy. He just wants antibiotics which are futile at this point due to severe necrosis of his lower extremity  Spoke to the surgical team about biopsy and recommended that biopsy is a futile effort as his limb is not salvageable. Recommended amputation but patient refused. I spoke to Dr Dayna Louie , he will see pt for surgery if pt is still agreeable to surgery  He met with palliative on , somewhat agreeable to surgery  Cont' current pain regiment: methadone and  IV morphine prn. Palliative following to also help with pain mangement. Appreciate vascular, gen surg's evaluation  I spoke to ortho-Dr Dayna Louie , who will evaluate pt for surgery if he doesn't change his mind.   ID following for abx     History of traumatic subarachnoid hemorrhage in 2020  Avoid NSAIDs and blood thinners  Continue SCDs for DVT prophylaxis  History of gout and osteoarthritis of both knees   Per chart review, patient was supposed to get knee replacement        # Chronic methadone use: Continue home dose methadone   # Hypothyroidism: Continue Synthroid  # HTN: Hold BP meds  # Left sided weakness w/ contracture. Chronic   # Acute gout flare, will start cochicine.           Code Status: Full code  Surrogate Decision Maker:  Su Huddleston Other Relative [phone]      DVT Prophylaxis: SCDs  GI Prophylaxis: not indicated     Baseline: Ambulatory      Discontinue IV fluids today     Subjective:     Chief Complaint / Reason for Physician Visit  Pt awake,  C/o gout pain. Discussed with RN events overnight. Review of Systems:  Symptom Y/N Comments  Symptom Y/N Comments   Fever/Chills n   Chest Pain n    Poor Appetite n   Edema n    Cough n   Abdominal Pain n    Sputum n   Joint Pain     SOB/BECK n   Pruritis/Rash     Nausea/vomit n   Tolerating PT/OT     Diarrhea n   Tolerating Diet     Constipation n   Other y Leg pain     Could NOT obtain due to:      Objective:     VITALS:   Last 24hrs VS reviewed since prior progress note. Most recent are:  Patient Vitals for the past 24 hrs:   Temp Pulse Resp BP SpO2   04/21/21 0728 98.1 °F (36.7 °C) 64 16 (!) 147/69 92 %   04/21/21 0332 98.3 °F (36.8 °C) 77 18 118/69 93 %   04/20/21 2359 98.3 °F (36.8 °C) 67 19 (!) 147/83 95 %   04/20/21 1955 98.3 °F (36.8 °C) 63 18 135/75 95 %   04/20/21 1514 98.6 °F (37 °C) 71 17 (!) 146/88 93 %   04/20/21 1135 98.2 °F (36.8 °C) 67 15 123/85 93 %       Intake/Output Summary (Last 24 hours) at 4/21/2021 0835  Last data filed at 4/21/2021 0131  Gross per 24 hour   Intake 480 ml   Output 1225 ml   Net -745 ml      Face-to-face encounter was provided on April 19, 2021 with the following findings  PHYSICAL EXAM:  General: WD, WN. Alert, cooperative, uncomfortable in bed    EENT:  EOMI. Anicteric sclerae. MMM  Resp:  CTA bilaterally, no wheezing or rales.   No accessory muscle use  CV:  Regular  rhythm,  No edema  GI:  Soft, Non distended, Non tender.  +Bowel sounds  Neurologic:  Alert and oriented X 3, normal speech,   Psych:   Not anxious nor agitated  Skin:  Necrosis and gangrenous looking left lower extremity ulceration. Reviewed most current lab test results and cultures  YES  Reviewed most current radiology test results   YES  Review and summation of old records today    NO  Reviewed patient's current orders and MAR    YES  PMH/SH reviewed - no change compared to H&P  ________________________________________________________________________  Care Plan discussed with:    Comments   Patient x    Family      RN x    Care Manager     Consultant  x ortho                     Multidiciplinary team rounds were held today with , nursing, pharmacist and clinical coordinator. Patient's plan of care was discussed; medications were reviewed and discharge planning was addressed. ________________________________________________________________________  Total NON critical care TIME:  35  Minutes    Total CRITICAL CARE TIME Spent:   Minutes non procedure based      Comments   >50% of visit spent in counseling and coordination of care     ________________________________________________________________________  Lemuel Mckinnon MD     Procedures: see electronic medical records for all procedures/Xrays and details which were not copied into this note but were reviewed prior to creation of Plan. LABS:  I reviewed today's most current labs and imaging studies.   Pertinent labs include:  Recent Labs     04/21/21 0422 04/20/21 0047 04/19/21  0446   WBC 10.7 11.5* 11.2*   HGB 10.4* 10.5* 10.3*   HCT 33.3* 33.4* 32.4*    203 203     Recent Labs     04/21/21 0422 04/20/21 0047 04/19/21  0446   * 136 136   K 3.9 3.9 3.7    102 104   CO2 29 33* 28   GLU 88 107* 118*   BUN 7 7 7   CREA 0.44* 0.74 0.61*   CA 8.5 8.5 8.5       Signed: Lemuel Mckinnon MD

## 2021-04-21 NOTE — PROGRESS NOTES
End of Shift Note    Bedside shift change report given to Radha (oncoming nurse) by Darby Dotson (offgoing nurse). Report included the following information SBAR, Kardex, MAR and Recent Results    Shift worked:  [id number]     Shift summary and any significant changes:     No major changes, pt still c/o pain in L foot and requesting medication often. Concerns for physician to address:  pain management     Zone phone for oncoming shift:          Activity:  Activity Level: Dangle Side of Bed  Number times ambulated in hallways past shift: 0  Number of times OOB to chair past shift: 0    Cardiac:   Cardiac Monitoring: Yes      Cardiac Rhythm: Normal sinus rhythm, Sinus tachycardia    Access:   Current line(s): PIV     Genitourinary:   Urinary status: voiding    Respiratory:   O2 Device: None (Room air)  Chronic home O2 use?: NO  Incentive spirometer at bedside: NO     GI:  Last Bowel Movement Date: 04/18/21  Current diet:  DIET REGULAR  Passing flatus: YES  Tolerating current diet: YES       Pain Management:   Patient states pain is manageable on current regimen: NO    Skin:  Dejuan Score: 13  Interventions: float heels    Patient Safety:  Fall Score:  Total Score: 3  Interventions: pt to call before getting OOB  High Fall Risk: Yes    Length of Stay:  Expected LOS: 3d 12h  Actual LOS: 6      Darby Dotson

## 2021-04-22 LAB
ANION GAP SERPL CALC-SCNC: 3 MMOL/L (ref 5–15)
BASOPHILS # BLD: 0.1 K/UL (ref 0–0.1)
BASOPHILS NFR BLD: 1 % (ref 0–1)
BUN SERPL-MCNC: 6 MG/DL (ref 6–20)
BUN/CREAT SERPL: 15 (ref 12–20)
CALCIUM SERPL-MCNC: 9 MG/DL (ref 8.5–10.1)
CHLORIDE SERPL-SCNC: 102 MMOL/L (ref 97–108)
CO2 SERPL-SCNC: 29 MMOL/L (ref 21–32)
CREAT SERPL-MCNC: 0.4 MG/DL (ref 0.7–1.3)
DIFFERENTIAL METHOD BLD: ABNORMAL
EOSINOPHIL # BLD: 0.5 K/UL (ref 0–0.4)
EOSINOPHIL NFR BLD: 5 % (ref 0–7)
ERYTHROCYTE [DISTWIDTH] IN BLOOD BY AUTOMATED COUNT: 17.2 % (ref 11.5–14.5)
GLUCOSE SERPL-MCNC: 75 MG/DL (ref 65–100)
HCT VFR BLD AUTO: 34.3 % (ref 36.6–50.3)
HGB BLD-MCNC: 10.9 G/DL (ref 12.1–17)
IMM GRANULOCYTES # BLD AUTO: 0 K/UL (ref 0–0.04)
IMM GRANULOCYTES NFR BLD AUTO: 0 % (ref 0–0.5)
LYMPHOCYTES # BLD: 1 K/UL (ref 0.8–3.5)
LYMPHOCYTES NFR BLD: 10 % (ref 12–49)
MCH RBC QN AUTO: 28.5 PG (ref 26–34)
MCHC RBC AUTO-ENTMCNC: 31.8 G/DL (ref 30–36.5)
MCV RBC AUTO: 89.6 FL (ref 80–99)
MONOCYTES # BLD: 0.6 K/UL (ref 0–1)
MONOCYTES NFR BLD: 6 % (ref 5–13)
NEUTS SEG # BLD: 8.2 K/UL (ref 1.8–8)
NEUTS SEG NFR BLD: 78 % (ref 32–75)
NRBC # BLD: 0 K/UL (ref 0–0.01)
NRBC BLD-RTO: 0 PER 100 WBC
PLATELET # BLD AUTO: 226 K/UL (ref 150–400)
PMV BLD AUTO: 8.7 FL (ref 8.9–12.9)
POTASSIUM SERPL-SCNC: 4.1 MMOL/L (ref 3.5–5.1)
RBC # BLD AUTO: 3.83 M/UL (ref 4.1–5.7)
RBC MORPH BLD: ABNORMAL
SODIUM SERPL-SCNC: 134 MMOL/L (ref 136–145)
WBC # BLD AUTO: 10.4 K/UL (ref 4.1–11.1)

## 2021-04-22 PROCEDURE — 36415 COLL VENOUS BLD VENIPUNCTURE: CPT

## 2021-04-22 PROCEDURE — 74011250637 HC RX REV CODE- 250/637: Performed by: INTERNAL MEDICINE

## 2021-04-22 PROCEDURE — 2709999900 HC NON-CHARGEABLE SUPPLY

## 2021-04-22 PROCEDURE — 85025 COMPLETE CBC W/AUTO DIFF WBC: CPT

## 2021-04-22 PROCEDURE — 80048 BASIC METABOLIC PNL TOTAL CA: CPT

## 2021-04-22 PROCEDURE — 74011250636 HC RX REV CODE- 250/636: Performed by: INTERNAL MEDICINE

## 2021-04-22 PROCEDURE — 65660000000 HC RM CCU STEPDOWN

## 2021-04-22 PROCEDURE — 74011000250 HC RX REV CODE- 250: Performed by: INTERNAL MEDICINE

## 2021-04-22 PROCEDURE — 74011000258 HC RX REV CODE- 258: Performed by: INTERNAL MEDICINE

## 2021-04-22 RX ADMIN — METHADONE HYDROCHLORIDE 140 MG: 10 CONCENTRATE ORAL at 09:23

## 2021-04-22 RX ADMIN — MEROPENEM 500 MG: 500 INJECTION, POWDER, FOR SOLUTION INTRAVENOUS at 05:25

## 2021-04-22 RX ADMIN — LIDOCAINE: 40 CREAM TOPICAL at 09:33

## 2021-04-22 RX ADMIN — COLCHICINE 0.6 MG: 0.6 TABLET, FILM COATED ORAL at 09:20

## 2021-04-22 RX ADMIN — GABAPENTIN 100 MG: 100 CAPSULE ORAL at 22:11

## 2021-04-22 RX ADMIN — MEROPENEM 500 MG: 500 INJECTION, POWDER, FOR SOLUTION INTRAVENOUS at 18:00

## 2021-04-22 RX ADMIN — MEROPENEM 500 MG: 500 INJECTION, POWDER, FOR SOLUTION INTRAVENOUS at 11:38

## 2021-04-22 RX ADMIN — LINEZOLID 600 MG: 600 INJECTION, SOLUTION INTRAVENOUS at 09:19

## 2021-04-22 RX ADMIN — TAMSULOSIN HYDROCHLORIDE 0.4 MG: 0.4 CAPSULE ORAL at 09:20

## 2021-04-22 RX ADMIN — GABAPENTIN 100 MG: 100 CAPSULE ORAL at 09:20

## 2021-04-22 RX ADMIN — GABAPENTIN 100 MG: 100 CAPSULE ORAL at 18:00

## 2021-04-22 RX ADMIN — MORPHINE SULFATE 2 MG: 2 INJECTION, SOLUTION INTRAMUSCULAR; INTRAVENOUS at 04:19

## 2021-04-22 RX ADMIN — MORPHINE SULFATE 2 MG: 2 INJECTION, SOLUTION INTRAMUSCULAR; INTRAVENOUS at 14:04

## 2021-04-22 RX ADMIN — POLYETHYLENE GLYCOL 3350 17 G: 17 POWDER, FOR SOLUTION ORAL at 09:19

## 2021-04-22 RX ADMIN — VARENICLINE TARTRATE 0.5 MG: 0.5 TABLET, FILM COATED ORAL at 09:20

## 2021-04-22 RX ADMIN — Medication 10 ML: at 05:25

## 2021-04-22 RX ADMIN — MORPHINE SULFATE 2 MG: 2 INJECTION, SOLUTION INTRAMUSCULAR; INTRAVENOUS at 22:10

## 2021-04-22 RX ADMIN — Medication 10 ML: at 14:03

## 2021-04-22 RX ADMIN — LINEZOLID 600 MG: 600 INJECTION, SOLUTION INTRAVENOUS at 19:53

## 2021-04-22 RX ADMIN — LEVOTHYROXINE SODIUM 112 MCG: 0.11 TABLET ORAL at 05:25

## 2021-04-22 RX ADMIN — Medication 10 ML: at 22:11

## 2021-04-22 RX ADMIN — ACETAMINOPHEN 650 MG: 325 TABLET ORAL at 05:54

## 2021-04-22 RX ADMIN — MORPHINE SULFATE 2 MG: 2 INJECTION, SOLUTION INTRAMUSCULAR; INTRAVENOUS at 18:16

## 2021-04-22 RX ADMIN — MORPHINE SULFATE 2 MG: 2 INJECTION, SOLUTION INTRAMUSCULAR; INTRAVENOUS at 09:20

## 2021-04-22 RX ADMIN — VARENICLINE TARTRATE 0.5 MG: 0.5 TABLET, FILM COATED ORAL at 18:16

## 2021-04-22 NOTE — PROGRESS NOTES
Hospitalist Progress Note    NAME: Ugo Moreno   :  1965   MRN:  [id number]       Assessment / Plan:    Sepsis POA: Temperature 101.4 at rate 108 WBC 25k  Infected extensive chronic  left lower extremity setting of pyoderma gangrenosum  Leukocytosis improving   current antibiotics including Zyvox, and meropenum  Patient is listed allergy to vancomycin  Wound cultures  Culture result: Abnormal       Preliminary   LIGHT ENTEROCOCCUS FAECALIS    Culture result: Abnormal       Preliminary   LIGHT ESCHERICHIA COLI ** (EXTENDED SPECTRUM BETA LACTAMASE ) **    Culture result: Abnormal       Preliminary   LIGHT PSEUDOMONAS AERUGINOSA PIP/JUANITO SENSITIVITY TO FOLLOW    Culture result: Abnormal       Preliminary   LIGHT ALCALIGENES FAECALIS         His biopsy in  showed likely pyoderma gangrenosum  Transition antibiotics to Zyvox and meropenem based on the sensitivities. He wants to continue the current treatment and likely biopsy. He just wants antibiotics which are futile at this point due to severe necrosis of his lower extremity  Spoke to the surgical team about biopsy and recommended that biopsy is a futile effort as his limb is not salvageable. Recommended amputation but patient was refusing. He is reconsidering this at the moment. Cont' current pain regiment: methadone and  IV morphine prn. Palliative following to also help with pain mangement. Appreciate vascular, gen surg's evaluation   Very much appreciate Dr Ford Coronel help. Note reviewed. Hopefully pt will move forward with surgery. Pt at this point is still undecided. He states he spoke to both of his daughters and they left it up to him to decide.   .  ID following for abx     History of traumatic subarachnoid hemorrhage in 2020  Avoid NSAIDs and blood thinners  Continue SCDs for DVT prophylaxis  History of gout and osteoarthritis of both knees   Per chart review, patient was supposed to get knee replacement        # Chronic methadone use: Continue home dose methadone   # Hypothyroidism: Continue Synthroid  # HTN: Hold BP meds  # Left sided weakness w/ contracture. Chronic   # Acute gout flare, cont' cochicine.           Code Status: Full code  Surrogate Decision Maker:  Frieda Fabry Other Relative [phone]      DVT Prophylaxis: SCDs  GI Prophylaxis: not indicated     Baseline: Ambulatory      Discontinue IV fluids today     Subjective:     Chief Complaint / Reason for Physician Visit  Pt awake, NAD. He understands the plan proposed, still undecided in regards to wether to process with surgery. He did had some good questions about the surgery. He told me Dr Wilmar Bee will come back and see him later    Discussed with RN events overnight. Review of Systems:  Symptom Y/N Comments  Symptom Y/N Comments   Fever/Chills n   Chest Pain n    Poor Appetite n   Edema n    Cough n   Abdominal Pain n    Sputum n   Joint Pain     SOB/BECK n   Pruritis/Rash     Nausea/vomit n   Tolerating PT/OT     Diarrhea n   Tolerating Diet     Constipation n   Other y Leg pain     Could NOT obtain due to:      Objective:     VITALS:   Last 24hrs VS reviewed since prior progress note. Most recent are:  Patient Vitals for the past 24 hrs:   Temp Pulse Resp BP SpO2   04/22/21 0756 98.2 °F (36.8 °C) 71 16 139/71 90 %   04/22/21 0430 98.3 °F (36.8 °C) 65 18 134/73 94 %   04/22/21 0045 98.2 °F (36.8 °C) 74 18 136/89 92 %   04/21/21 2046 98.3 °F (36.8 °C) 68 18 (!) 156/80 94 %   04/21/21 1524 97.9 °F (36.6 °C) 67 16 (!) 118/56 93 %   04/21/21 1150 98.1 °F (36.7 °C) 64 16 138/73 91 %       Intake/Output Summary (Last 24 hours) at 4/22/2021 1053  Last data filed at 4/22/2021 0431  Gross per 24 hour   Intake --   Output 1000 ml   Net -1000 ml      Face-to-face encounter was provided on April 19, 2021 with the following findings  PHYSICAL EXAM:  General: WD, WN. Alert, cooperative, uncomfortable in bed    EENT:  EOMI. Anicteric sclerae. MMM  Resp:  CTA bilaterally, no wheezing or rales. No accessory muscle use  CV:  Regular  rhythm,  No edema  GI:  Soft, Non distended, Non tender.  +Bowel sounds  Neurologic:  Alert and oriented X 3, normal speech,   Psych:   Not anxious nor agitated  Skin:  Necrosis and gangrenous looking left lower extremity ulceration. Reviewed most current lab test results and cultures  YES  Reviewed most current radiology test results   YES  Review and summation of old records today    NO  Reviewed patient's current orders and MAR    YES  PMH/SH reviewed - no change compared to H&P  ________________________________________________________________________  Care Plan discussed with:    Comments   Patient x    Family      RN x    Care Manager     Consultant  x ortho                     Multidiciplinary team rounds were held today with , nursing, pharmacist and clinical coordinator. Patient's plan of care was discussed; medications were reviewed and discharge planning was addressed. ________________________________________________________________________  Total NON critical care TIME:  35  Minutes    Total CRITICAL CARE TIME Spent:   Minutes non procedure based      Comments   >50% of visit spent in counseling and coordination of care     ________________________________________________________________________  Demario Baltazar MD     Procedures: see electronic medical records for all procedures/Xrays and details which were not copied into this note but were reviewed prior to creation of Plan. LABS:  I reviewed today's most current labs and imaging studies.   Pertinent labs include:  Recent Labs     04/22/21  0443 04/21/21  0422 04/20/21  0047   WBC 10.4 10.7 11.5*   HGB 10.9* 10.4* 10.5*   HCT 34.3* 33.3* 33.4*    208 203     Recent Labs     04/22/21  0443 04/21/21  0422 04/20/21  0047   * 134* 136   K 4.1 3.9 3.9    102 102   CO2 29 29 33*   GLU 75 88 107*   BUN 6 7 7   CREA 0.40* 0.44* 0.74 CA 9.0 8.5 8.5       Signed: Domingo Baxter MD

## 2021-04-22 NOTE — PROGRESS NOTES
Problem: Falls - Risk of  Goal: *Absence of Falls  Description: Document Yamileth Pena Fall Risk and appropriate interventions in the flowsheet.   Outcome: Progressing Towards Goal  Note: Fall Risk Interventions:  Mobility Interventions: Bed/chair exit alarm, Communicate number of staff needed for ambulation/transfer, Patient to call before getting OOB, Utilize walker, cane, or other assistive device         Medication Interventions: Bed/chair exit alarm, Patient to call before getting OOB, Teach patient to arise slowly    Elimination Interventions: Bed/chair exit alarm, Call light in reach, Patient to call for help with toileting needs, Toileting schedule/hourly rounds, Urinal in reach    History of Falls Interventions: Bed/chair exit alarm, Room close to nurse's station

## 2021-04-22 NOTE — PROGRESS NOTES
End of Shift Note    Bedside shift change report given to [facility] ARLENE (oncoming nurse) by America Álvarez RN (offgoing nurse). Report included the following information SBAR, Kardex, Intake/Output and MAR    Shift worked:  7a - 7p     Shift summary and any significant changes:     Wound care done per order.  Orthopedic physician to see pt tomorrow     Concerns for physician to address:  n/a     Zone phone for oncoming shift:   [phone] Mark Ritchie, RN

## 2021-04-22 NOTE — CONSULTS
4/21/2021      CC: Chronic left leg wounds    HPI:      This is a 63y.o. year old male who has a history of chronic left leg wound, he has been seen by vascular surgery, orthopedic surgery, general surgery. He has been seen by several different hospital systems, he is currently on chronic pain management due to his nerve pain from a stroke. Overall, he does have a significant history of sepsis with this chronic open wound of his ankle, he is ambulatory on that side. Due to noncompliance and being discharged from various practices, I have been consulted for evaluation and possible management. PMH:  Past Medical History:   Diagnosis Date    Aneurysm ([address]     (with stroke)    Bladder tumor     Cancer ([address]     bladder CA    Chronic pain     Family history of bladder cancer     GERD (gastroesophageal reflux disease)     Gout     History of vascular access device 04/25/2019    [facility] VAT 4 FR MIDLINE R Cephalic Limited access    History of vascular access device 04/26/2019    4 fr Midline right Cephalic midline access    Hypercholesterolemia     Hypertension     Lesion of bladder     Seizures ([address]     Stroke ([address] 2006    left sided weakness    Thromboembolus ([address]     left leg    Thyroid disease     TIA (transient ischemic attack) 2012       PSxHx:  Past Surgical History:   Procedure Laterality Date    HX ORTHOPAEDIC      R arthroscopy    HX OTHER SURGICAL      x2 L foot    HX UROLOGICAL  04/20/2018    Cystoscopy, TURBT (greater than 5 cm resected) Dr. Altagracia Lorenzana, [facility].     KNEE ARTHROSCP HARV      left knee    TRANSURETHRAL RESEC BLADDER NECK  08/10/2018       Meds:    Current Facility-Administered Medications:     linezolid in dextrose 5% (ZYVOX) IVPB premix in D5W 600 mg, 600 mg, IntraVENous, Q12H, Amarilis Li MD, 600 mg at 04/21/21 2133    [START ON 4/22/2021] colchicine tablet 0.6 mg, 0.6 mg, Oral, DAILY, Ferny Li MD    morphine injection 2 mg, 2 mg, IntraVENous, Q4H PRN, Clemetine Evelyn MD JONI, 2 mg at 04/21/21 2131    meropenem (MERREM) 500 mg in 0.9% sodium chloride (MBP/ADV) 50 mL MBP, 500 mg, IntraVENous, Q6H, Yesy EDOUARD MD, Last Rate: 100 mL/hr at 04/21/21 1718, 500 mg at 04/21/21 1718    acetaminophen (TYLENOL) tablet 650 mg, 650 mg, Oral, Q6H PRN, Marilynn Jaramillo MD, 650 mg at 04/18/21 1303    ondansetron (ZOFRAN) injection 4 mg, 4 mg, IntraVENous, Q6H PRN, Marilynn Jaramillo MD, 4 mg at 04/21/21 1734    polyethylene glycol (MIRALAX) packet 17 g, 17 g, Oral, DAILY, Marilynn Jaramillo MD, 17 g at 04/21/21 0804    levothyroxine (SYNTHROID) tablet 112 mcg, 112 mcg, Oral, ACB, Marilynn Jaramillo MD, 112 mcg at 04/21/21 0608    lidocaine (XYLOCAINE) 4 % cream, , Topical, DAILY, Marilynn Jaramillo MD, Given at 04/21/21 6351    sodium chloride (NS) flush 5-10 mL, 5-10 mL, IntraVENous, PRN, Lucila Thompson MD, 10 mL at 04/21/21 2132    gabapentin (NEURONTIN) capsule 100 mg, 100 mg, Oral, TID, Lucila Thompson MD, 100 mg at 04/21/21 2130    melatonin tablet 3 mg, 3 mg, Oral, QHS PRN, Lucila Thompson MD    [facility]) capsule 0.4 mg, 0.4 mg, Oral, DAILY, Lucila Thompson MD, 0.4 mg at 04/21/21 0805    methadone (DOLOPHINE) 10 mg/mL concentrated solution 140 mg, 140 mg, Oral, DAILY, Lucila Thompson MD, 140 mg at 04/21/21 0807    [COMPLETED] varenicline (CHANTIX) 0.5 mg tablet 0.5 mg, 0.5 mg, Oral, DAILY AFTER BREAKFAST, 0.5 mg at 04/18/21 0852 **FOLLOWED BY** varenicline (CHANTIX) 0.5 mg tablet 0.5 mg, 0.5 mg, Oral, BIDPC, 0.5 mg at 04/21/21 1718 **FOLLOWED BY** [START ON 4/23/2021] varenicline (CHANTIX) 0.5 mg tablet 1 mg, 1 mg, Oral, BIDPC, Lucila Thompson MD    All:   Allergies   Allergen Reactions    Sulfamethoxazole-Trimethoprim Rash     L eye and L hand    Ciprofloxacin Hives     Per pcp records    Codeine Hives     Tolerates dilaudid, oxycodone    Cymbalta [Duloxetine] Other (comments)     Confusion and memory loss    Lyrica [Pregabalin] Hives    Sulfa (Sulfonamide Antibiotics) Rash    Ultram [Tramadol] Hives    Vancomycin Shortness of Breath    Zosyn [Piperacillin-Tazobactam] Rash       Social Hx:  Social History     Socioeconomic History    Marital status:      Spouse name: Not on file    Number of children: Not on file    Years of education: Not on file    Highest education level: Not on file   Social Needs    Financial resource strain: Not very hard    Food insecurity     Worry: Never true     Inability: Never true    Transportation needs     Medical: No     Non-medical: No   Tobacco Use    Smoking status: Current Every Day Smoker     Packs/day: 0.50    Smokeless tobacco: Never Used    Tobacco comment: instructed not to smoke 24 hrs prior surgery   Substance and Sexual Activity    Alcohol use: Yes     Alcohol/week: 6.0 standard drinks     Types: 6 Cans of beer per week     Comment: occasional    Drug use: Yes     Types: Prescription    Sexual activity: Yes   Lifestyle    Physical activity     Days per week: 7 days     Minutes per session: 30 min    Stress: Not at all   Relationships    Social connections     Talks on phone: Not on file     Gets together: Three times a week     Attends L[employer] day service: Patient refused     Active member of club or organization: Patient refused     Attends meetings of clubs or organizations: Never     Relationship status:    Other Topics Concern     Service No    Blood Transfusions No     Comment: refused to make decision    Caffeine Concern No    Occupational Exposure No    Hobby Hazards No    Sleep Concern No    Stress Concern No    Weight Concern No    Special Diet No    Back Care No    Exercise No    Bike Helmet No    Seat Belt Yes    Self-Exams No   Social History Narrative    60year old  male admitted for reported SI in the context of bladder CA, chronic pain, homelessness, and opiod dependence.  Pt has been in multiple psychiatric admissions for the same compliants in the last 2 years,       Family Hx:  Family History   Problem Relation Age of Onset    Diabetes Father     Lung Disease Father     Diabetes Sister     Diabetes Brother     Cancer Paternal Grandfather         Bladder         Review of Systems:       General: Denies headache, lethargy, fever, weight loss  Ears/Nose/Throat: Denies ear discharge, drainage, nosebleeds, hoarse voice, dental problems  Cardiovascular: Denies chest pain, shortness of breath  Lungs: Denies chest pain, breathing problems, wheezing, pneumonia  Stomach: Denies stomach pain, heartburn, constipation, irritable bowel  Skin: Denies rash, sores, open wounds  Musculoskeletal: Chronic left leg pain and ulceration below mid calf  Genitourinary: Denies dysuria, hematuria, polyuria  Gastrointestinal: Denies constipation, obstipation, diarrhea  Neurological: Denies changes in sight, smell, hearing, taste, seizures. Denies loss of consciousness.   Psychiatric: Denies depression, sleep pattern changes, anxiety, change in personality  Endocrine: Denies mood swings, heat or cold intolerance  Hematologic/Lymphatic: Denies anemia, purpura, petechia  Allergic/Immunologic: Denies swelling of throat, pain or swelling at lymph nodes      Physical Examination:    Visit Vitals  BP (!) 156/80 (BP 1 Location: Right arm, BP Patient Position: At rest)   Pulse 68   Temp 98.3 °F (36.8 °C)   Resp 18   Ht 5' 6\" (1.676 m)   Wt 240 lb (108.9 kg)   SpO2 94%   BMI 38.74 kg/m²        General: AOX3, no apparent distress  Psychiatric: mood and affect appropriate  Lungs: breathing is symmetric and unlabored bilaterally  Heart: regular rate and rhythm  Abdomen: no guarding  Head: normocephalic, atraumatic  Skin: No significant abnormalities, good turgor  Sensation intact to light touch: C5-T1 dermatomes  Muscular exam: 5/5 strength in all major muscle groups unless noted in specialty exam.    Extremities      Right upper extremity: Extremity is examined, no evidence of gross deformity, no gross motor or sensory deficit. Full active and passive range of motion is noted. Muscle tone and bulk is within normal expected limits. Capillary refill is less than 2 seconds distally. Left upper extremity: Diminished motion is noted, no gross deformity. Right lower extremity: Extremity is examined, no evidence of gross deformity, no gross motor or sensory deficit. Full active and passive range of motion is noted. Muscle tone and bulk is within normal expected limits. Capillary refill is less than 2 seconds distally. Left lower extremity: Chronic ulceration down to bone is noted along the distal one half of the lower leg, exposed tendons and muscle are noted. No active motion. Cellulitic changes extend to just below the knee. No active motion at the ankle. Diagnostics:    Pertinent Diagnostics: MRI and CT is evaluated, significant soft tissue defects, no abscesses proximal to the soft tissue defect. Soft tissue defect is extensive    Assessment:  unreconstructable soft tissue injury, left leg  Plan: This patient has the above-mentioned issue, he does have no potential to heal this on his own at this point, salvage procedures are indicated based on the recurrent sepsis that he has had. He is indicated for either below knee amputation versus above-knee amputation depending on the status of the soft vascular status. I have reviewed his imaging and by imaging, he may be a candidate for a high below-knee amputation, though I will consult the records of the vascular surgery team to ensure that he has enough blood flow for this. I discussed with the patient that this would be elective on his part, however he does have a life-threatening issue in the sense that his recurrent sepsis episodes are likely related to his exposed skin. I told him that this would not guarantee pain relief, however likely would be better for him in the sense that it would reduce his risk of death.   I will speak with him again in the morning see what he thinks. Time in consultation: 75 minutes. Mr. Abraham Wolf has a reminder for a \"due or due soon\" health maintenance. I have asked that he contact his primary care provider for follow-up on this health maintenance.

## 2021-04-22 NOTE — PROGRESS NOTES
Comprehensive Nutrition Assessment    Type and Reason for Visit: Initial, RD nutrition re-screen/LOS    Nutrition Recommendations/Plan:   Continue regular diet     Nutrition Assessment:     Patient medically noted for sepsis and infected leg wound; amputation recommended. PMH Stroke, HTN, and depression. Chart reviewed for length of stay. Receiving a regular diet. Patient reports a good appetite and eating well. Complimentary of nutrition staff and those who take his meal orders. Patient trying to decide on whether to move forward with BKA or not. Encouraged intake of meals/snacks. Continue regular diet. Patient Vitals for the past 168 hrs:   % Diet Eaten   04/20/21 1225 76 - 100%   04/20/21 0812 1 - 25%   04/19/21 1352 76 - 100%   04/18/21 1843 76 - 100%   04/17/21 1728 76 - 100%   04/16/21 1823 76 - 100%     Estimated Daily Nutrient Needs:  Energy (kcal): 1985 kcal (BMR 1863 x 1. 2AF -500kcal); Weight Used for Energy Requirements: Current  Protein (g): 109g 1.0 g/kg bw); Weight Used for Protein Requirements: Current  Fluid (ml/day): 2000 mL; Method Used for Fluid Requirements:      Nutrition Related Findings:       Na 134, BG 17--118  BM 4/19  Synthroid, Miralax     Wounds:    Venous stasis, Open wounds       Current Nutrition Therapies:  DIET REGULAR    Anthropometric Measures:  · Height:  6' 1\" (185.4 cm)  · Current Body Wt:  108.9 kg (240 lb 1.3 oz)   · BMI Category:  Obese class 1 (BMI 30.0-34. 9)       Nutrition Diagnosis:   No nutrition diagnosis at this time     Nutrition Interventions:   Food and/or Nutrient Delivery: Continue current diet  Nutrition Education and Counseling: No recommendations at this time  Coordination of Nutrition Care: No recommendation at this time    Goals:  PO intake >75% of meals next 5-7 days       Nutrition Monitoring and Evaluation:   Behavioral-Environmental Outcomes: None identified  Food/Nutrient Intake Outcomes: Food and nutrient intake  Physical Signs/Symptoms Outcomes: Biochemical data, Skin, Weight    Discharge Planning:    Continue current diet     Electronically signed by Susy Garcia RD on 4/22/2021 at 12:06 PM    Contact: ext [phone]

## 2021-04-22 NOTE — PROGRESS NOTES
PALLIATIVE MEDICINE      Palliative Medicine was consulted for pain management. Recommendations made in my last note. Will sign off. please re-consult if Palliative Medicine can be of further assistance. Thank you for including Palliative Medicine in this patient's care.    JAY Krause

## 2021-04-23 LAB
ANION GAP SERPL CALC-SCNC: 5 MMOL/L (ref 5–15)
BASOPHILS # BLD: 0.1 K/UL (ref 0–0.1)
BASOPHILS NFR BLD: 1 % (ref 0–1)
BUN SERPL-MCNC: 6 MG/DL (ref 6–20)
BUN/CREAT SERPL: 14 (ref 12–20)
CALCIUM SERPL-MCNC: 8.4 MG/DL (ref 8.5–10.1)
CHLORIDE SERPL-SCNC: 100 MMOL/L (ref 97–108)
CO2 SERPL-SCNC: 28 MMOL/L (ref 21–32)
CREAT SERPL-MCNC: 0.44 MG/DL (ref 0.7–1.3)
DIFFERENTIAL METHOD BLD: ABNORMAL
EOSINOPHIL # BLD: 0.7 K/UL (ref 0–0.4)
EOSINOPHIL NFR BLD: 6 % (ref 0–7)
ERYTHROCYTE [DISTWIDTH] IN BLOOD BY AUTOMATED COUNT: 17.2 % (ref 11.5–14.5)
GLUCOSE SERPL-MCNC: 91 MG/DL (ref 65–100)
HCT VFR BLD AUTO: 34.1 % (ref 36.6–50.3)
HGB BLD-MCNC: 10.8 G/DL (ref 12.1–17)
IMM GRANULOCYTES # BLD AUTO: 0.2 K/UL (ref 0–0.04)
IMM GRANULOCYTES NFR BLD AUTO: 2 % (ref 0–0.5)
LYMPHOCYTES # BLD: 1.8 K/UL (ref 0.8–3.5)
LYMPHOCYTES NFR BLD: 15 % (ref 12–49)
MCH RBC QN AUTO: 28.1 PG (ref 26–34)
MCHC RBC AUTO-ENTMCNC: 31.7 G/DL (ref 30–36.5)
MCV RBC AUTO: 88.8 FL (ref 80–99)
MONOCYTES # BLD: 1.1 K/UL (ref 0–1)
MONOCYTES NFR BLD: 9 % (ref 5–13)
NEUTS SEG # BLD: 8.2 K/UL (ref 1.8–8)
NEUTS SEG NFR BLD: 67 % (ref 32–75)
NRBC # BLD: 0 K/UL (ref 0–0.01)
NRBC BLD-RTO: 0 PER 100 WBC
PLATELET # BLD AUTO: 237 K/UL (ref 150–400)
PMV BLD AUTO: 8.9 FL (ref 8.9–12.9)
POTASSIUM SERPL-SCNC: 4.5 MMOL/L (ref 3.5–5.1)
RBC # BLD AUTO: 3.84 M/UL (ref 4.1–5.7)
SODIUM SERPL-SCNC: 133 MMOL/L (ref 136–145)
WBC # BLD AUTO: 12 K/UL (ref 4.1–11.1)

## 2021-04-23 PROCEDURE — 74011000250 HC RX REV CODE- 250: Performed by: INTERNAL MEDICINE

## 2021-04-23 PROCEDURE — 85025 COMPLETE CBC W/AUTO DIFF WBC: CPT

## 2021-04-23 PROCEDURE — 99221 1ST HOSP IP/OBS SF/LOW 40: CPT | Performed by: ORTHOPAEDIC SURGERY

## 2021-04-23 PROCEDURE — 2709999900 HC NON-CHARGEABLE SUPPLY

## 2021-04-23 PROCEDURE — 99232 SBSQ HOSP IP/OBS MODERATE 35: CPT | Performed by: INTERNAL MEDICINE

## 2021-04-23 PROCEDURE — 74011250637 HC RX REV CODE- 250/637: Performed by: INTERNAL MEDICINE

## 2021-04-23 PROCEDURE — 74011250636 HC RX REV CODE- 250/636: Performed by: INTERNAL MEDICINE

## 2021-04-23 PROCEDURE — 65660000000 HC RM CCU STEPDOWN

## 2021-04-23 PROCEDURE — 74011000258 HC RX REV CODE- 258: Performed by: INTERNAL MEDICINE

## 2021-04-23 PROCEDURE — 80048 BASIC METABOLIC PNL TOTAL CA: CPT

## 2021-04-23 PROCEDURE — 36415 COLL VENOUS BLD VENIPUNCTURE: CPT

## 2021-04-23 RX ORDER — COLCHICINE 0.6 MG/1
0.6 TABLET ORAL 2 TIMES DAILY
Status: DISCONTINUED | OUTPATIENT
Start: 2021-04-23 | End: 2021-04-29 | Stop reason: HOSPADM

## 2021-04-23 RX ADMIN — ACETAMINOPHEN 650 MG: 325 TABLET ORAL at 02:43

## 2021-04-23 RX ADMIN — POLYETHYLENE GLYCOL 3350 17 G: 17 POWDER, FOR SOLUTION ORAL at 09:30

## 2021-04-23 RX ADMIN — MORPHINE SULFATE 2 MG: 2 INJECTION, SOLUTION INTRAMUSCULAR; INTRAVENOUS at 19:36

## 2021-04-23 RX ADMIN — MEROPENEM 500 MG: 500 INJECTION, POWDER, FOR SOLUTION INTRAVENOUS at 22:25

## 2021-04-23 RX ADMIN — LINEZOLID 600 MG: 600 INJECTION, SOLUTION INTRAVENOUS at 22:21

## 2021-04-23 RX ADMIN — LINEZOLID 600 MG: 600 INJECTION, SOLUTION INTRAVENOUS at 09:42

## 2021-04-23 RX ADMIN — GABAPENTIN 100 MG: 100 CAPSULE ORAL at 22:24

## 2021-04-23 RX ADMIN — VARENICLINE TARTRATE 1 MG: 0.5 TABLET, FILM COATED ORAL at 19:36

## 2021-04-23 RX ADMIN — MORPHINE SULFATE 2 MG: 2 INJECTION, SOLUTION INTRAMUSCULAR; INTRAVENOUS at 14:19

## 2021-04-23 RX ADMIN — MORPHINE SULFATE 2 MG: 2 INJECTION, SOLUTION INTRAMUSCULAR; INTRAVENOUS at 04:12

## 2021-04-23 RX ADMIN — METHADONE HYDROCHLORIDE 140 MG: 10 CONCENTRATE ORAL at 09:32

## 2021-04-23 RX ADMIN — GABAPENTIN 100 MG: 100 CAPSULE ORAL at 16:32

## 2021-04-23 RX ADMIN — MORPHINE SULFATE 2 MG: 2 INJECTION, SOLUTION INTRAMUSCULAR; INTRAVENOUS at 09:33

## 2021-04-23 RX ADMIN — LEVOTHYROXINE SODIUM 112 MCG: 0.11 TABLET ORAL at 05:18

## 2021-04-23 RX ADMIN — COLCHICINE 0.6 MG: 0.6 TABLET, FILM COATED ORAL at 19:36

## 2021-04-23 RX ADMIN — Medication 10 ML: at 22:25

## 2021-04-23 RX ADMIN — VARENICLINE TARTRATE 1 MG: 0.5 TABLET, FILM COATED ORAL at 09:30

## 2021-04-23 RX ADMIN — MEROPENEM 500 MG: 500 INJECTION, POWDER, FOR SOLUTION INTRAVENOUS at 00:00

## 2021-04-23 RX ADMIN — MORPHINE SULFATE 2 MG: 2 INJECTION, SOLUTION INTRAMUSCULAR; INTRAVENOUS at 23:26

## 2021-04-23 RX ADMIN — TAMSULOSIN HYDROCHLORIDE 0.4 MG: 0.4 CAPSULE ORAL at 09:29

## 2021-04-23 RX ADMIN — MEROPENEM 500 MG: 500 INJECTION, POWDER, FOR SOLUTION INTRAVENOUS at 16:32

## 2021-04-23 RX ADMIN — GABAPENTIN 100 MG: 100 CAPSULE ORAL at 09:29

## 2021-04-23 RX ADMIN — MEROPENEM 500 MG: 500 INJECTION, POWDER, FOR SOLUTION INTRAVENOUS at 12:16

## 2021-04-23 RX ADMIN — LIDOCAINE: 40 CREAM TOPICAL at 12:15

## 2021-04-23 RX ADMIN — ACETAMINOPHEN 650 MG: 325 TABLET ORAL at 16:31

## 2021-04-23 RX ADMIN — COLCHICINE 0.6 MG: 0.6 TABLET, FILM COATED ORAL at 09:29

## 2021-04-23 NOTE — PROGRESS NOTES
End of Shift Note    Bedside shift change report given to Will Vann (oncoming nurse) by Kamille June RN (offgoing nurse). Report included the following information SBAR, Kardex, Intake/Output and MAR    Shift worked:  7a - 7p     Shift summary and any significant changes:    Wound care done today.  Patient on specialty bed     Concerns for physician to address:  none     Zone phone for oncoming shift:   [phone] Mark Ritchie, RN

## 2021-04-23 NOTE — PROGRESS NOTES
End of Shift Note    Bedside shift change report given to Zaki (oncoming nurse) by Magdalena Vines (offgoing nurse).   Report included the following information SBAR, Kardex, Intake/Output, MAR, Accordion and Recent Results    Shift worked:  1529-5500     Shift summary and any significant changes:     No significant changes     Concerns for physician to address:       Zone phone for oncoming shift:   [phone]         Magdalena Vines

## 2021-04-23 NOTE — PROGRESS NOTES
Problem: Falls - Risk of  Goal: *Absence of Falls  Description: Document Mati Arias Fall Risk and appropriate interventions in the flowsheet. Outcome: Progressing Towards Goal  Note: Fall Risk Interventions:  Mobility Interventions: Patient to call before getting OOB, Bed/chair exit alarm         Medication Interventions: Teach patient to arise slowly, Patient to call before getting OOB    Elimination Interventions: Call light in reach, Urinal in reach    History of Falls Interventions: Bed/chair exit alarm         Problem: Pressure Injury - Risk of  Goal: *Prevention of pressure injury  Description: Document Dejuan Scale and appropriate interventions in the flowsheet.   Outcome: Progressing Towards Goal  Note: Pressure Injury Interventions:  Sensory Interventions: Keep linens dry and wrinkle-free, Float heels, Assess changes in LOC, Check visual cues for pain, Minimize linen layers    Moisture Interventions: Absorbent underpads, Minimize layers    Activity Interventions: Increase time out of bed    Mobility Interventions: Float heels, HOB 30 degrees or less, Chair cushion    Nutrition Interventions: Document food/fluid/supplement intake    Friction and Shear Interventions: Apply protective barrier, creams and emollients, Minimize layers

## 2021-04-23 NOTE — PROGRESS NOTES
Transition of Care Plan:     RUR: 26% - moderate  Disposition: TBD - SNF vs HH  Follow up appointments: New PCP appt, Ortho  DME needed: TBD  Transportation at Discharge: Pt's friend  Taurus Vaca or means to access home:  Pt's friend has access      Caregiver Contact: Friend, Bonnie Singh ([phone])  Discharge Caregiver contacted prior to discharge?  CM will contact pt's cg 24 hours prior to d/c    2:37 PM  CM was contacted by pt's cg, Lisette Werner. She asked about d/c plans which have not been discussed yet due to pt still deciding on surgery or not. She also discussed pt's PMHx and pt's mindset on why he is still contemplating amputation. 9:42 AM  CM reviewed chart. Pt is still deciding on if he wants to go forward with surgery. CM will continue to follow for discharge planning while patient is admitted on current unit. Please contact this CM with questions or issues related to discharge.      Shae Lopez MSW  Care Manager [id number] [address] [facility]  [phone]

## 2021-04-23 NOTE — PROGRESS NOTES
S: No complaints, no acute events. O:   Visit Vitals  /79 (BP 1 Location: Right upper arm, BP Patient Position: At rest)   Pulse 70   Temp 99.1 °F (37.3 °C)   Resp 16   Ht 6' 1\" (1.854 m)   Wt 256 lb 14.4 oz (116.5 kg)   SpO2 93%   BMI 33.89 kg/m²       Dressing C/D/I  Sensation not tested  TA/GCS/EHL: not tested  Capillary refill less than 2 seconds. A: Chronic lower extremity ulceration, chronic infection, sepsis    P:  This patient and I had a very long discussion regarding his situation. We discussed his current state, the reasons behind why this happened, the probable outcomes of nonoperative, salvage, and amputation surgeries. I explained that there is very little, if not nonexistant chance, of salvaging this limb. Additionally, this is likely a life threatening issue in the sense that sepsis, repeated exposure to broad spectrum antibiotics, and physiologic stress will likely lead to a septic condition, which may be fatal.  Surgery itself does have risks, however, we would proceed with the utmost of caution, and likely stage this if we were to proceed. He asked me to call his caretaker, and I will do so. Time in consultation: 35 minutes.   DVT prophylaxis  D/C planning

## 2021-04-23 NOTE — PROGRESS NOTES
Infectious Disease progress      IMPRESSION:   · Sepsis  · Chronic LLE wounds with cellulitis, necrosis,  s/p recent treatment with 5/6 weeks of Zosyn IV at [facility]  · Polymicrobial WC- 4/16+ for -ESBL E. Coli, Pseudomonas, Enterococcus faecalis, Alcaligenes faecalis  · CT- LLE-negative for osteomyelitis   · H/o Pyoderma gangrenosum vs malignancy on pathology in 4/2019  · S/p intra lesional steroids by Dermatology  · Pt has declined amputation that has been recommended by multiple subspecialtues  · Chronic pain syndrome on methadone   · H/o hemorrhagic stroke with residual left sided weakness  · Seizure disorder  · Hypertension stable  · Hypothyroidism on synthroid       PLAN:      · Continue Meropenem Zyvox IV x 2 weeks   · D/w pt again current wound cultures & presence of multiple organisms including ESBL  . Patient advised that continued antibiotic therapy would be futile and predispose to development of resistant organisms that would be more difficult to treat over time. Patient has infection that has spread further up his leg, in spite of being on antimicrobials. Patient at this time would probably require AKA. · I have D/w Dr Shayla Trinidad performing biopsy of wound to evaluate for possible development of malignancy. Dr Rula Faith recommends amputation. D/w patient that amputation is his best option, patient voiced understanding. Patient seen today. Awake ,coherent. Patient states she has not decided to have surgery. Marah Rowell, 63 y.o. male with PMHx significant for prior CVA, seizures, prior DVT, GERD, bladder cancer, chronic nonhealing   wound on his left lower extremity x 2 years , recommendation for BKA by multiple specialties, but pt continues to refuse. Pathology on 4/25/19 was + for Pyoderma gangrenosum vs malignancy. Patient  presented with concerns for worsening infection of the left lower extremity wound. Patient reports that the wound has been present for two and half years .  He had previously been on Zosyn iv  via PICC line for reported Pseudomonas and E. coli infection. He has been off antibiotic since the middle of March. Per ED note Caregiver reported that over the last 24 hours the leg had gotten worse with spreading redness above the area of the chronic wound. Patient had reported chronic pain in the area that is not significantly changed. No fevers at home. No chest pain, shortness of breath, abdominal pain, nausea, vomiting. Per Records pt was admitted at hospital at [facility]. 2/9-2/26  Bejuan m.[age] y.o. male who presented to ED for IV antibiotics he has a chronic left lower extremity wound and history of pyoderma gangrenosum. He has been getting steroid injections and reports that his leg has been doing better however apparently he had a culture done reasons unclear that showed ESBL and Pseudomonas and he was sent in for IV antibiotics. He denies any fevers or chills or worsening of his chronic wound. He has chronic pain which is unchanged. The dermatologist to send him into the hospital was not reachable tonight. ( Dr. Rashi Dietrich) patient has had this left lower extremity ulceration for over 2 years and maybe it has been worsening but he was a very poor historian. He had been taking steroids every 4 to 6 weeks and thought it was improving and scabbing over and not draining as much       Problems Managed During Hospitalization:  1. Infected Chronic LLE wounds with cellulitis with possible osteomyelitis of the lateral malleolus   Wounds have been present by report for 2 1/2 yrs.    -Chronic extensive LLE Pyoderma gangrenosum dx'ed 11/2020 with superimposed infected wounds with cellulitis and CT suggestive of lateral malleolus OM   -PG treated with intralesional steroid injections per Dermatology as OP Dr Rashi Dietrich  -Unclear if he ever received immunomodulator as his PG appears severe  -His cultures showed pseudomonas and ESBL ( ?colonization vs true infection)Currently on zosyn and ID following  -He could not tolerate MRI x 2 even after benzo for anxiety   -He was seen by vascular and Podiatry, offered BKA. PATIENT DECLINED AMPUTATION and would like to continue with his dermatologist and plastic  -He had debridements in the past but unclear if it helps or exacerbates PG( Pathergy)  -seen in consult by ID and has PICC placed for prolonged course of IV antibiotics per ID. 6 weeks of antibiotics   -confirmed his desire for no amputation. ID has discussed about possible outcomes including persistence or worsening of infection including sepsis and death despite long-term abx with his decision to not have amputation. He expresses understanding. Pt seen today . Appears drowsy . S/p Wound care . D/w Wound Care . Infected & necrotic wounds present. 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    FH: bladder cancer Z80.52    Left foot infection L08.9    Major depression C77.0    Uncomplicated opioid dependence (HCC) F11.20    Chronic obstructive pulmonary disease (HCC) J44.9    Chronic pain disorder G89.4    Hypokalemia E87.6    Seizure disorder ([facility]) G40.909    Tobacco abuse Z72.0    Foot ulcer ([facility]) L97.509    Nonadherence to medical treatment Z91.19    Sinus bradycardia R00.1    Gout M10.9    Hypothyroidism E03.9    Foot infection L08.9    Depression F32.9    Open wound, lower leg S81.809A    Pyoderma gangrenosum L88    Traumatic subarachnoid hemorrhage (HCC) Q41.9R2D    Traumatic subarachnoid hemorrhage without loss of consciousness ([address] S06.6X0A    Infected wound T14. 8XXA, L08.9    Sepsis ([address] A41.9     Past Medical History:   Diagnosis Date    Aneurysm ([address]     (with stroke)    Bladder tumor     Cancer ([address]     bladder CA    Chronic pain     Family history of bladder cancer     GERD (gastroesophageal reflux disease)     Gout     History of vascular access device 04/25/2019    [facility] VAT 4 FR MIDLINE R Cephalic Limited access    History of vascular access device 04/26/2019    4 fr Midline right Cephalic midline access    Hypercholesterolemia     Hypertension     Lesion of bladder     Seizures ([address]     Stroke ([address] 2006    left sided weakness    Thromboembolus ([address]     left leg    Thyroid disease     TIA (transient ischemic attack) 2012      Family History   Problem Relation Age of Onset    Diabetes Father     Lung Disease Father     Diabetes Sister     Diabetes Brother     Cancer Paternal Grandfather         Bladder      Social History     Tobacco Use    Smoking status: Current Every Day Smoker     Packs/day: 0.50    Smokeless tobacco: Never Used    Tobacco comment: instructed not to smoke 24 hrs prior surgery   Substance Use Topics    Alcohol use: Yes     Alcohol/week: 6.0 standard drinks     Types: 6 Cans of beer per week     Comment: occasional     Past Surgical History:   Procedure Laterality Date    HX ORTHOPAEDIC      R arthroscopy    HX OTHER SURGICAL      x2 L foot    HX UROLOGICAL  04/20/2018    Cystoscopy, TURBT (greater than 5 cm resected) Dr. Joshua Rey, [facility].  KNEE ARTHROSCP HARV      left knee    TRANSURETHRAL RESEC BLADDER NECK  08/10/2018      Prior to Admission medications    Medication Sig Start Date End Date Taking?  Authorizing Provider colchicine 0.6 mg tablet Take 0.6 mg by mouth daily as needed for Gout or Pain. Indications: acute inflammation of the joints due to gout attack   Yes Provider, Historical   varenicline (Chantix Starting Month Box) 0.5 mg (11)- 1 mg (42) DsPk Take  by mouth. Take one tablet by mouth in morning with food for 3 days then increase to 1 tablet by mouth twice daily with food thereafter as directed   Indications: stop smoking   Yes Provider, Historical   tamsulosin (FLOMAX) 0.4 mg capsule Take 1 capsule by mouth daily after dinner  3/2/21  Yes Mya Montgomery MD   lidocaine (XYLOCAINE) 4 % topical cream Apply  to affected area as needed for Pain (apply to left lower leg wounds during dressing changes). 12/22/20  Yes Denisha Kent MD   gabapentin (NEURONTIN) 100 mg capsule Take 100 mg by mouth three (3) times daily. Yes Provider, Historical   levothyroxine (SYNTHROID) 112 mcg tablet Take 112 mcg by mouth Daily (before breakfast). Yes Provider, Historical   lisinopriL (PRINIVIL, ZESTRIL) 20 mg tablet Take 20 mg by mouth daily. Yes Provider, Historical   naproxen sodium (Aleve) 220 mg tablet Take 220 mg by mouth three (3) times daily as needed. Yes Provider, Historical   aspirin 81 mg chewable tablet Take 81 mg by mouth daily. Yes Provider, Historical   pantoprazole (PROTONIX) 40 mg tablet Take 40 mg by mouth daily as needed. 7/17/19  Yes Provider, Historical   allopurinol (ZYLOPRIM) 100 mg tablet Take 1 Tab by mouth daily. Indications: treatment to prevent acute gout attack 5/24/19  Yes Trisha Maguire NP   senna-docusate (DARELL-COLACE) 8.6-50 mg per tablet Take 1 Tab by mouth daily as needed. Indications: constipation   Yes Provider, Historical   methadone (DOLOPHINE) 10 mg/mL solution Take 140 mg by mouth daily. Indications: excessive pain   Yes Provider, Historical   melatonin 3 mg tablet Take 1 Tab by mouth nightly as needed.  10/12/18  Yes Arlyn Guo MD     Allergies   Allergen Reactions    Sulfamethoxazole-Trimethoprim Rash     L eye and L hand    Ciprofloxacin Hives     Per pcp records    Codeine Hives     Tolerates dilaudid, oxycodone    Cymbalta [Duloxetine] Other (comments)     Confusion and memory loss    Lyrica [Pregabalin] Hives    Sulfa (Sulfonamide Antibiotics) Rash    Ultram [Tramadol] Hives    Vancomycin Shortness of Breath    Zosyn [Piperacillin-Tazobactam] Rash        Review of Systems:  A comprehensive review of systems was negative except for that written in the History of Present Illness. 10 point review of systems obtained . All other systems negative    Objective:   Blood pressure (!) 145/69, pulse 70, temperature 98.5 °F (36.9 °C), resp. rate 16, height 6' 1\" (1.854 m), weight 256 lb 14.4 oz (116.5 kg), SpO2 91 %.   Temp (24hrs), Av.4 °F (36.9 °C), Min:98 °F (36.7 °C), Max:99.1 °F (37.3 °C)    Current Facility-Administered Medications   Medication Dose Route Frequency    meropenem (MERREM) 500 mg in 0.9% sodium chloride (MBP/ADV) 50 mL MBP  500 mg IntraVENous Q6H    linezolid in dextrose 5% (ZYVOX) IVPB premix in D5W 600 mg  600 mg IntraVENous Q12H    colchicine tablet 0.6 mg  0.6 mg Oral DAILY    morphine injection 2 mg  2 mg IntraVENous Q4H PRN    acetaminophen (TYLENOL) tablet 650 mg  650 mg Oral Q6H PRN    ondansetron (ZOFRAN) injection 4 mg  4 mg IntraVENous Q6H PRN    polyethylene glycol (MIRALAX) packet 17 g  17 g Oral DAILY    levothyroxine (SYNTHROID) tablet 112 mcg  112 mcg Oral ACB    lidocaine (XYLOCAINE) 4 % cream   Topical DAILY    sodium chloride (NS) flush 5-10 mL  5-10 mL IntraVENous PRN    gabapentin (NEURONTIN) capsule 100 mg  100 mg Oral TID    melatonin tablet 3 mg  3 mg Oral QHS PRN    tamsulosin (FLOMAX) capsule 0.4 mg  0.4 mg Oral DAILY    methadone (DOLOPHINE) 10 mg/mL concentrated solution 140 mg  140 mg Oral DAILY    varenicline (CHANTIX) 0.5 mg tablet 1 mg  1 mg Oral BIDPC        Exam:    General:  Awake, drowsy ,cooperative, Eyes:  Sclera anicteric. Pupils equally round and reactive to light. Mouth/Throat: Mucous membranes , oral pharynx mildly dry   Neck: Supple   Lungs:   Reduced auscultation bilaterally, good effort   CV:  Regular rate and rhythm,no murmur, click, rub or gallop   Abdomen:   Soft, non-tender. bowel sounds normal. non-distended   Extremities: No  edema   Skin: Skin color, texture, turgor normal. no acute rash or lesions   Lymph nodes: Cervical and supraclavicular normal   Musculoskeletal: LLE dressing +   Lines/Devices:  Intact, no erythema, drainage or tenderness   Psych: Awake  and oriented, normal mood affect        Data Review:   CBC:   Recent Labs     04/23/21  0400 04/22/21  0443 04/21/21  0422   WBC 12.0* 10.4 10.7   RBC 3.84* 3.83* 3.70*   HGB 10.8* 10.9* 10.4*   HCT 34.1* 34.3* 33.3*    226 208   GRANS 67 78* 66   LYMPH 15 10* 12   EOS 6 5 9*     CMP:   Recent Labs     04/23/21  0400 04/22/21  0443 04/21/21  0422   GLU 91 75 88   * 134* 134*   K 4.5 4.1 3.9    102 102   CO2 28 29 29   BUN 6 6 7   CREA 0.44* 0.40* 0.44*   CA 8.4* 9.0 8.5   AGAP 5 3* 3*   BUCR 14 15 16       Lab Results   Component Value Date/Time    Culture result: NO ANAEROBES ISOLATED 04/16/2021 02:36 PM    Culture result: NO FUNGUS ISOLATED 3 DAYS 04/16/2021 02:36 PM    Culture result: LIGHT ENTEROCOCCUS FAECALIS (A) 04/16/2021 02:35 PM    Culture result: (A) 04/16/2021 02:35 PM     LIGHT ESCHERICHIA COLI ** (EXTENDED SPECTRUM BETA LACTAMASE ) **    Culture result: (A) 04/16/2021 02:35 PM     LIGHT PSEUDOMONAS AERUGINOSA PIP/JUANITO = 19mm = INTERMEDIATE    Culture result: LIGHT ALCALIGENES FAECALIS (A) 04/16/2021 02:35 PM          XR Results (most recent):  Results from [place] encounter on 04/15/21   XR ANKLE LT AP/LAT    Narrative EXAM: XR ANKLE LT AP/LAT    INDICATION: Trauma. COMPARISON: None.     FINDINGS: Two views of the left ankle demonstrate diffuse soft tissue swelling  and osteopenia and mild DJD, no definite fracture. Impression  impression: No acute bone findings suspected. ICD-10-CM ICD-9-CM    1. Sepsis, due to unspecified organism, unspecified whether acute organ dysfunction present ([facility]  A41.9 038.9      995.91    2. Cellulitis of left lower extremity  L03.116 682.6    3. Wound of left foot  S91.302A 892.0    4. History of bladder cancer  Z85.51 V10.51    5. Multiple open wounds of lower leg, left, initial encounter  S81.802A 894.0    6. Subarachnoid hemorrhage ([facility])  I60.9 430    7. Infected wound  T14. 8XXA 958.3     L08.9     8. Brain aneurysm  I67.1 437.3    9. Cerebral hemorrhage with hemiparesis ([facility])  I61.9 431     G81.90 342.90    10. Acquired hypothyroidism  E03.9 244.9    11. Chronic pain disorder  G89.4 338.4    12. Long term current use of methadone for pain control  Z79.891 V58.69    13. Foot infection  L08.9 686.9    14. Physical debility  R53.81 799.3    15. Enterococcus faecalis infection  B95.2 041.04    16. History of pyoderma gangrenosum  Z87.2 V13.3    17. Infection due to ESBL-producing Escherichia coli  A49.8 041.49     Z16.12 V09.1    18. Osteomyelitis of lower leg, left, acute ([facility])  M86.162 730.06    19. Pseudomonas infection  A49.8 041.7    20. Sepsis without acute organ dysfunction, due to unspecified organism ([facility]  A41.9 038.9      995.91    21. Transient weakness of left lower extremity  R29.898 729.89    22. Central pain syndrome  G89.0 338.0    23. Seizure disorder ([facility]  G40.909 345.90      Zyvox IV  Meropenem IV      Antibiotic History  Current Regimen of Each Antimicrobial:  Cefepime 2 g IV Q8H (Start Date 4/15; Day # 4)  Metronidazole 500 mg IV Q8H (Start Date 4/15;  Day #4)  Linezolid 600 mg PO BID (Start Date 4/15; Day 4)     Previous Antimicrobial Therapy:  Zosyn x1  Vancomycin - pharmacy to dose        S/p Zosyn IV - x 5 weeks , last dose 3/21/21  I have discussed the diagnosis with the patient and the intended plan as seen in the above orders. I have discussed medication side effects and warnings with the patient as well.     Reviewed test results at length with patient    Signed By: Nithya Fisher MD FACP

## 2021-04-23 NOTE — PROGRESS NOTES
Hospitalist Progress Note    NAME: Rashi Galvez   :  1965   MRN:  [id number]       Assessment / Plan:    Sepsis POA: Temperature 101.4 at rate 108 WBC 25k  Infected extensive chronic  left lower extremity setting of pyoderma gangrenosum  Leukocytosis improving   current antibiotics including Zyvox, and meropenum  Patient is listed allergy to vancomycin  Wound cultures  Culture result: Abnormal       Preliminary   LIGHT ENTEROCOCCUS FAECALIS    Culture result: Abnormal       Preliminary   LIGHT ESCHERICHIA COLI ** (EXTENDED SPECTRUM BETA LACTAMASE ) **    Culture result: Abnormal       Preliminary   LIGHT PSEUDOMONAS AERUGINOSA PIP/JUANITO SENSITIVITY TO FOLLOW    Culture result: Abnormal       Preliminary   LIGHT ALCALIGENES FAECALIS         His biopsy in  showed likely pyoderma gangrenosum  Transition antibiotics to Zyvox and meropenem based on the sensitivities. He wants to continue the current treatment and likely biopsy. He just wants antibiotics which are futile at this point due to severe necrosis of his lower extremity  Spoke to the surgical team about biopsy and recommended that biopsy is a futile effort as his limb is not salvageable. Recommended amputation but patient was refusing. He is reconsidering this at the moment. Cont' current pain regiment: methadone and  IV morphine prn. Palliative following to also help with pain mangement. Appreciate vascular, gen surg's evaluation   Very much appreciate Dr Audi Paige help. Notes reviewed. Hopefully pt will move forward with surgery. ID following for abx     History of traumatic subarachnoid hemorrhage in 2020  Avoid NSAIDs and blood thinners  Continue SCDs for DVT prophylaxis  History of gout and osteoarthritis of both knees   Cont' cochicine. # Chronic methadone use: Continue home dose methadone   # Hypothyroidism: Continue Synthroid  # HTN: Hold BP meds  # Left sided weakness w/ contracture. Chronic         Code Status: Full code  Surrogate Decision Maker:  Preethi Crabtree Other Relative [phone]   [phone]      DVT Prophylaxis: SCDs  GI Prophylaxis: not indicated     Baseline: Ambulatory      Discontinue IV fluids today     Subjective:     Chief Complaint / Reason for Physician Visit  Pt awake, NAD. Pleasant. In good spirits today. Discussed with RN events overnight. Review of Systems:  Symptom Y/N Comments  Symptom Y/N Comments   Fever/Chills n   Chest Pain n    Poor Appetite n   Edema n    Cough n   Abdominal Pain n    Sputum n   Joint Pain     SOB/BECK n   Pruritis/Rash     Nausea/vomit n   Tolerating PT/OT     Diarrhea n   Tolerating Diet     Constipation n   Other       Could NOT obtain due to:      Objective:     VITALS:   Last 24hrs VS reviewed since prior progress note. Most recent are:  Patient Vitals for the past 24 hrs:   Temp Pulse Resp BP SpO2   04/23/21 1043 98.5 °F (36.9 °C) 70 16 (!) 145/69 91 %   04/23/21 0754 99.1 °F (37.3 °C) 70 16 136/79 93 %   04/23/21 0247 98.4 °F (36.9 °C) 84 18 (!) 142/79 96 %   04/22/21 2245 98 °F (36.7 °C) 74 18 132/66 94 %   04/22/21 1943 98.1 °F (36.7 °C) 77 19 136/74 95 %   04/22/21 1455 98 °F (36.7 °C) 79 16 126/86 93 %       Intake/Output Summary (Last 24 hours) at 4/23/2021 1121  Last data filed at 4/23/2021 0415  Gross per 24 hour   Intake --   Output 1650 ml   Net -1650 ml      Face-to-face encounter was provided on April 19, 2021 with the following findings  PHYSICAL EXAM:  General: WD, WN. Alert, cooperative, uncomfortable in bed    EENT:  EOMI. Anicteric sclerae. MMM  Resp:  CTA bilaterally, no wheezing or rales. No accessory muscle use  CV:  Regular  rhythm,  No edema  GI:  Soft, Non distended, Non tender.  +Bowel sounds  Neurologic:  Alert and oriented X 3, normal speech,   Psych:   Not anxious nor agitated  Skin:  Necrosis and gangrenous looking left lower extremity ulceration.     Reviewed most current lab test results and cultures  YES  Reviewed most current radiology test results   YES  Review and summation of old records today    NO  Reviewed patient's current orders and MAR    YES  PMH/SH reviewed - no change compared to H&P  ________________________________________________________________________  Care Plan discussed with:    Comments   Patient x    Family      RN x    Care Manager     Consultant                        Multidiciplinary team rounds were held today with , nursing, pharmacist and clinical coordinator. Patient's plan of care was discussed; medications were reviewed and discharge planning was addressed. ________________________________________________________________________  Total NON critical care TIME:  35  Minutes    Total CRITICAL CARE TIME Spent:   Minutes non procedure based      Comments   >50% of visit spent in counseling and coordination of care     ________________________________________________________________________  Mansi Hickman MD     Procedures: see electronic medical records for all procedures/Xrays and details which were not copied into this note but were reviewed prior to creation of Plan. LABS:  I reviewed today's most current labs and imaging studies.   Pertinent labs include:  Recent Labs     04/23/21  0400 04/22/21 0443 04/21/21  0422   WBC 12.0* 10.4 10.7   HGB 10.8* 10.9* 10.4*   HCT 34.1* 34.3* 33.3*    226 208     Recent Labs     04/23/21  0400 04/22/21 0443 04/21/21  0422   * 134* 134*   K 4.5 4.1 3.9    102 102   CO2 28 29 29   GLU 91 75 88   BUN 6 6 7   CREA 0.44* 0.40* 0.44*   CA 8.4* 9.0 8.5       Signed: Mansi Hickman MD

## 2021-04-24 PROCEDURE — 74011250637 HC RX REV CODE- 250/637: Performed by: INTERNAL MEDICINE

## 2021-04-24 PROCEDURE — 74011250636 HC RX REV CODE- 250/636: Performed by: INTERNAL MEDICINE

## 2021-04-24 PROCEDURE — 2709999900 HC NON-CHARGEABLE SUPPLY

## 2021-04-24 PROCEDURE — 65660000000 HC RM CCU STEPDOWN

## 2021-04-24 PROCEDURE — 74011000258 HC RX REV CODE- 258: Performed by: INTERNAL MEDICINE

## 2021-04-24 PROCEDURE — 74011000250 HC RX REV CODE- 250: Performed by: INTERNAL MEDICINE

## 2021-04-24 RX ADMIN — LINEZOLID 600 MG: 600 INJECTION, SOLUTION INTRAVENOUS at 21:01

## 2021-04-24 RX ADMIN — MEROPENEM 500 MG: 500 INJECTION, POWDER, FOR SOLUTION INTRAVENOUS at 05:00

## 2021-04-24 RX ADMIN — MORPHINE SULFATE 2 MG: 2 INJECTION, SOLUTION INTRAMUSCULAR; INTRAVENOUS at 09:23

## 2021-04-24 RX ADMIN — GABAPENTIN 100 MG: 100 CAPSULE ORAL at 17:13

## 2021-04-24 RX ADMIN — Medication 10 ML: at 13:05

## 2021-04-24 RX ADMIN — MEROPENEM 500 MG: 500 INJECTION, POWDER, FOR SOLUTION INTRAVENOUS at 23:24

## 2021-04-24 RX ADMIN — VARENICLINE TARTRATE 1 MG: 0.5 TABLET, FILM COATED ORAL at 19:33

## 2021-04-24 RX ADMIN — LEVOTHYROXINE SODIUM 112 MCG: 0.11 TABLET ORAL at 06:38

## 2021-04-24 RX ADMIN — COLCHICINE 0.6 MG: 0.6 TABLET, FILM COATED ORAL at 09:31

## 2021-04-24 RX ADMIN — ACETAMINOPHEN 650 MG: 325 TABLET ORAL at 18:53

## 2021-04-24 RX ADMIN — MORPHINE SULFATE 2 MG: 2 INJECTION, SOLUTION INTRAMUSCULAR; INTRAVENOUS at 13:05

## 2021-04-24 RX ADMIN — MORPHINE SULFATE 2 MG: 2 INJECTION, SOLUTION INTRAMUSCULAR; INTRAVENOUS at 03:20

## 2021-04-24 RX ADMIN — METHADONE HYDROCHLORIDE 140 MG: 10 CONCENTRATE ORAL at 09:30

## 2021-04-24 RX ADMIN — MORPHINE SULFATE 2 MG: 2 INJECTION, SOLUTION INTRAMUSCULAR; INTRAVENOUS at 23:25

## 2021-04-24 RX ADMIN — MEROPENEM 500 MG: 500 INJECTION, POWDER, FOR SOLUTION INTRAVENOUS at 17:13

## 2021-04-24 RX ADMIN — LINEZOLID 600 MG: 600 INJECTION, SOLUTION INTRAVENOUS at 08:00

## 2021-04-24 RX ADMIN — LIDOCAINE: 40 CREAM TOPICAL at 17:13

## 2021-04-24 RX ADMIN — Medication 10 ML: at 21:01

## 2021-04-24 RX ADMIN — COLCHICINE 0.6 MG: 0.6 TABLET, FILM COATED ORAL at 19:33

## 2021-04-24 RX ADMIN — ACETAMINOPHEN 650 MG: 325 TABLET ORAL at 06:04

## 2021-04-24 RX ADMIN — MORPHINE SULFATE 2 MG: 2 INJECTION, SOLUTION INTRAMUSCULAR; INTRAVENOUS at 17:12

## 2021-04-24 RX ADMIN — GABAPENTIN 100 MG: 100 CAPSULE ORAL at 09:32

## 2021-04-24 RX ADMIN — VARENICLINE TARTRATE 1 MG: 0.5 TABLET, FILM COATED ORAL at 09:31

## 2021-04-24 RX ADMIN — TAMSULOSIN HYDROCHLORIDE 0.4 MG: 0.4 CAPSULE ORAL at 09:32

## 2021-04-24 RX ADMIN — POLYETHYLENE GLYCOL 3350 17 G: 17 POWDER, FOR SOLUTION ORAL at 09:32

## 2021-04-24 RX ADMIN — MEROPENEM 500 MG: 500 INJECTION, POWDER, FOR SOLUTION INTRAVENOUS at 11:22

## 2021-04-24 RX ADMIN — GABAPENTIN 100 MG: 100 CAPSULE ORAL at 23:24

## 2021-04-24 NOTE — PROGRESS NOTES
End of Shift Note    Bedside shift change report given to Reg Roth (oncoming nurse) by Juan Carlos Acevedo (offgoing nurse). Report included the following information SBAR, Kardex, Intake/Output, MAR and Accordion    Shift worked:  [id number]     Shift summary and any significant changes:     No significant changes.        Concerns for physician to address:       Zone phone for oncoming shift:   [phone]           Juan Carlos Acevedo

## 2021-04-24 NOTE — PROGRESS NOTES
Hospitalist Progress Note    NAME: Romeo García   :  1965   MRN:  [id number]       Assessment / Plan:    Sepsis POA: Temperature 101.4 at rate 108 WBC 25k  Infected extensive chronic  left lower extremity setting of pyoderma gangrenosum  Leukocytosis improving   current antibiotics including Zyvox, and meropenum  Patient is listed allergy to vancomycin  Wound cultures  Culture result: Abnormal       Preliminary   LIGHT ENTEROCOCCUS FAECALIS    Culture result: Abnormal       Preliminary   LIGHT ESCHERICHIA COLI ** (EXTENDED SPECTRUM BETA LACTAMASE ) **    Culture result: Abnormal       Preliminary   LIGHT PSEUDOMONAS AERUGINOSA PIP/JUANITO SENSITIVITY TO FOLLOW    Culture result: Abnormal       Preliminary   LIGHT ALCALIGENES FAECALIS         His biopsy in  showed likely pyoderma gangrenosum  Transition antibiotics to Zyvox and meropenem based on the sensitivities. He wants to continue the current treatment and likely biopsy. He just wants antibiotics which are futile at this point due to severe necrosis of his lower extremity  Spoke to the surgical team about biopsy and recommended that biopsy is a futile effort as his limb is not salvageable. Recommended amputation but patient was refusing. He is reconsidering this at the moment. Cont' current pain regiment: methadone and  IV morphine prn. Palliative following to also help with pain mangement. Appreciate vascular, gen surg's evaluation   Very much appreciate Dr Candie Cruz help. Notes reviewed. Hopefully pt will move forward with surgery. ID following for abx     History of traumatic subarachnoid hemorrhage in 2020  Avoid NSAIDs and blood thinners  Continue SCDs for DVT prophylaxis  History of gout and osteoarthritis of both knees   Cont' cochicine. # Chronic methadone use: Continue home dose methadone   # Hypothyroidism: Continue Synthroid  # HTN: Hold BP meds  # Left sided weakness w/ contracture. Chronic         Code Status: Full code  Surrogate Decision Maker:  Nilam Mejia Other Relative [phone]      DVT Prophylaxis: SCDs  GI Prophylaxis: not indicated     Baseline: Ambulatory      Discontinue IV fluids today     Subjective:     Chief Complaint / Reason for Physician Visit  Pt awake, NAD. He had a lot of questions about prosthesis. Discussed with RN events overnight. Review of Systems:  Symptom Y/N Comments  Symptom Y/N Comments   Fever/Chills n   Chest Pain n    Poor Appetite n   Edema n    Cough n   Abdominal Pain n    Sputum n   Joint Pain     SOB/BECK n   Pruritis/Rash     Nausea/vomit n   Tolerating PT/OT     Diarrhea n   Tolerating Diet     Constipation n   Other       Could NOT obtain due to:      Objective:     VITALS:   Last 24hrs VS reviewed since prior progress note. Most recent are:  Patient Vitals for the past 24 hrs:   Temp Pulse Resp BP SpO2   04/24/21 1134 98.4 °F (36.9 °C) 69 16 126/68 93 %   04/24/21 0734 98.3 °F (36.8 °C) 61 16 (!) 160/68 94 %   04/24/21 0321 98.4 °F (36.9 °C) 75 16 138/80 93 %   04/23/21 2326 98.6 °F (37 °C) 80 17 (!) 140/76 91 %   04/23/21 1511 98.5 °F (36.9 °C) 72 18 132/78 90 %       Intake/Output Summary (Last 24 hours) at 4/24/2021 1311  Last data filed at 4/24/2021 7098  Gross per 24 hour   Intake 480 ml   Output 875 ml   Net -395 ml      Face-to-face encounter was provided on April 19, 2021 with the following findings  PHYSICAL EXAM:  General: WD, WN. Alert, cooperative, uncomfortable in bed    EENT:  EOMI. Anicteric sclerae. MMM  Resp:  CTA bilaterally, no wheezing or rales. No accessory muscle use  CV:  Regular  rhythm,  No edema  GI:  Soft, Non distended, Non tender.  +Bowel sounds  Neurologic:  Alert and oriented X 3, normal speech,   Psych:   Not anxious nor agitated  Skin:  Necrosis and gangrenous looking left lower extremity ulceration.     Reviewed most current lab test results and cultures  YES  Reviewed most current radiology test results   YES  Review and summation of old records today    NO  Reviewed patient's current orders and MAR    YES  PMH/SH reviewed - no change compared to H&P  ________________________________________________________________________  Care Plan discussed with:    Comments   Patient x    Family      RN x    Care Manager     Consultant  x Ortho team                     Multidiciplinary team rounds were held today with , nursing, pharmacist and clinical coordinator. Patient's plan of care was discussed; medications were reviewed and discharge planning was addressed. ________________________________________________________________________  Total NON critical care TIME:  30 Minutes    Total CRITICAL CARE TIME Spent:   Minutes non procedure based      Comments   >50% of visit spent in counseling and coordination of care     ________________________________________________________________________  Kyree Atwood MD     Procedures: see electronic medical records for all procedures/Xrays and details which were not copied into this note but were reviewed prior to creation of Plan. LABS:  I reviewed today's most current labs and imaging studies.   Pertinent labs include:  Recent Labs     04/23/21  0400 04/22/21  0443   WBC 12.0* 10.4   HGB 10.8* 10.9*   HCT 34.1* 34.3*    226     Recent Labs     04/23/21  0400 04/22/21  0443   * 134*   K 4.5 4.1    102   CO2 28 29   GLU 91 75   BUN 6 6   CREA 0.44* 0.40*   CA 8.4* 9.0       Signed: Kyree Atwood MD

## 2021-04-24 NOTE — PROGRESS NOTES
End of Shift Note    Bedside shift change report given to Beto Aguirre RN (oncoming nurse) by Gume Camilo (offgoing nurse). Report included the following information SBAR, Kardex, Intake/Output, MAR and Recent Results    Shift worked:  1075-2955     Shift summary and any significant changes:     4202-6391     Concerns for physician to address:  pt req \"pill\" for constipation in addition to Miralax already adm     Zone phone for oncoming shift:          Activity:  Activity Level: Up with Assistance  Number times ambulated in hallways past shift: 0  Number of times OOB to chair past shift: 1    Cardiac:   Cardiac Monitoring: Yes      Cardiac Rhythm: Normal sinus rhythm    Access:   Current line(s): PIV     Genitourinary:   Urinary status: voiding    Respiratory:   O2 Device: None (Room air)  Chronic home O2 use?: NO  Incentive spirometer at bedside: NO     GI:  Last Bowel Movement Date: 04/23/21  Current diet:  DIET REGULAR  Passing flatus: Tolerating current diet: YES       Pain Management:   Patient states pain is manageable on current regimen: NO    Skin:  Dejuan Score: 14  Interventions: speciality bed, float heels, increase time out of bed, limit briefs and nutritional support     Patient Safety:  Fall Score:  Total Score: 4  Interventions: assistive device (walker, cane, etc), gripper socks, pt to call before getting OOB and stay with me (per policy)  High Fall Risk: Yes    Length of Stay:  Expected LOS: 3d 12h  Actual LOS: 9      Gume paez

## 2021-04-24 NOTE — PROGRESS NOTES
Ortho Progress Note:    Spoke with pt regarding decision about amputation as discussed with pt by Dr. Alexandra Diehl  Pt has not made a decision, I did answer questions regarding prothesis and long term use, I explained to the pt that is all done outpt after the incision heals. We need to decide on the surgery first.     Pt verbalizing understanding and will make a decision soon. Dr. Nancy Méndez and Dr. Alexandra Diehl aware     [facility], [place].  Sami, HERB-BC  Orthopedic Trauma Team [id number]

## 2021-04-25 PROCEDURE — 74011250637 HC RX REV CODE- 250/637: Performed by: INTERNAL MEDICINE

## 2021-04-25 PROCEDURE — 74011250636 HC RX REV CODE- 250/636: Performed by: INTERNAL MEDICINE

## 2021-04-25 PROCEDURE — 74011000250 HC RX REV CODE- 250: Performed by: INTERNAL MEDICINE

## 2021-04-25 PROCEDURE — 65660000000 HC RM CCU STEPDOWN

## 2021-04-25 PROCEDURE — 2709999900 HC NON-CHARGEABLE SUPPLY

## 2021-04-25 PROCEDURE — 74011000258 HC RX REV CODE- 258: Performed by: INTERNAL MEDICINE

## 2021-04-25 RX ADMIN — ACETAMINOPHEN 650 MG: 325 TABLET ORAL at 09:41

## 2021-04-25 RX ADMIN — Medication 10 ML: at 15:11

## 2021-04-25 RX ADMIN — ACETAMINOPHEN 650 MG: 325 TABLET ORAL at 17:01

## 2021-04-25 RX ADMIN — MEROPENEM 500 MG: 500 INJECTION, POWDER, FOR SOLUTION INTRAVENOUS at 22:21

## 2021-04-25 RX ADMIN — ACETAMINOPHEN 650 MG: 325 TABLET ORAL at 22:21

## 2021-04-25 RX ADMIN — MORPHINE SULFATE 2 MG: 2 INJECTION, SOLUTION INTRAMUSCULAR; INTRAVENOUS at 08:17

## 2021-04-25 RX ADMIN — LEVOTHYROXINE SODIUM 112 MCG: 0.11 TABLET ORAL at 06:06

## 2021-04-25 RX ADMIN — ACETAMINOPHEN 650 MG: 325 TABLET ORAL at 02:15

## 2021-04-25 RX ADMIN — COLCHICINE 0.6 MG: 0.6 TABLET, FILM COATED ORAL at 09:41

## 2021-04-25 RX ADMIN — TAMSULOSIN HYDROCHLORIDE 0.4 MG: 0.4 CAPSULE ORAL at 08:26

## 2021-04-25 RX ADMIN — LINEZOLID 600 MG: 600 INJECTION, SOLUTION INTRAVENOUS at 20:24

## 2021-04-25 RX ADMIN — MORPHINE SULFATE 2 MG: 2 INJECTION, SOLUTION INTRAMUSCULAR; INTRAVENOUS at 03:32

## 2021-04-25 RX ADMIN — METHADONE HYDROCHLORIDE 140 MG: 10 CONCENTRATE ORAL at 10:30

## 2021-04-25 RX ADMIN — GABAPENTIN 100 MG: 100 CAPSULE ORAL at 15:11

## 2021-04-25 RX ADMIN — POLYETHYLENE GLYCOL 3350 17 G: 17 POWDER, FOR SOLUTION ORAL at 08:25

## 2021-04-25 RX ADMIN — Medication 10 ML: at 20:23

## 2021-04-25 RX ADMIN — MORPHINE SULFATE 2 MG: 2 INJECTION, SOLUTION INTRAMUSCULAR; INTRAVENOUS at 15:12

## 2021-04-25 RX ADMIN — VARENICLINE TARTRATE 1 MG: 0.5 TABLET, FILM COATED ORAL at 17:57

## 2021-04-25 RX ADMIN — COLCHICINE 0.6 MG: 0.6 TABLET, FILM COATED ORAL at 17:57

## 2021-04-25 RX ADMIN — LIDOCAINE: 40 CREAM TOPICAL at 10:24

## 2021-04-25 RX ADMIN — MEROPENEM 500 MG: 500 INJECTION, POWDER, FOR SOLUTION INTRAVENOUS at 17:01

## 2021-04-25 RX ADMIN — MEROPENEM 500 MG: 500 INJECTION, POWDER, FOR SOLUTION INTRAVENOUS at 06:06

## 2021-04-25 RX ADMIN — MORPHINE SULFATE 2 MG: 2 INJECTION, SOLUTION INTRAMUSCULAR; INTRAVENOUS at 20:24

## 2021-04-25 RX ADMIN — VARENICLINE TARTRATE 1 MG: 0.5 TABLET, FILM COATED ORAL at 09:41

## 2021-04-25 RX ADMIN — MEROPENEM 500 MG: 500 INJECTION, POWDER, FOR SOLUTION INTRAVENOUS at 12:53

## 2021-04-25 RX ADMIN — GABAPENTIN 100 MG: 100 CAPSULE ORAL at 21:10

## 2021-04-25 RX ADMIN — GABAPENTIN 100 MG: 100 CAPSULE ORAL at 08:26

## 2021-04-25 RX ADMIN — Medication 10 ML: at 06:06

## 2021-04-25 RX ADMIN — LINEZOLID 600 MG: 600 INJECTION, SOLUTION INTRAVENOUS at 09:43

## 2021-04-25 NOTE — PROGRESS NOTES
Hospitalist Progress Note    NAME: Les Gay   :  1965   MRN:  [id number]       Assessment / Plan:    Sepsis POA: Temperature 101.4 at rate 108 WBC 25k  Infected extensive chronic  left lower extremity setting of pyoderma gangrenosum  Leukocytosis improving   current antibiotics including Zyvox, and meropenum  Patient is listed allergy to vancomycin  Wound cultures  Culture result: Abnormal       Preliminary   LIGHT ENTEROCOCCUS FAECALIS    Culture result: Abnormal       Preliminary   LIGHT ESCHERICHIA COLI ** (EXTENDED SPECTRUM BETA LACTAMASE ) **    Culture result: Abnormal       Preliminary   LIGHT PSEUDOMONAS AERUGINOSA PIP/JUANITO SENSITIVITY TO FOLLOW    Culture result: Abnormal       Preliminary   LIGHT ALCALIGENES FAECALIS         His biopsy in  showed likely pyoderma gangrenosum  Transition antibiotics to Zyvox and meropenem based on the sensitivities. He wants to continue the current treatment and likely biopsy. He just wants antibiotics which are futile at this point due to severe necrosis of his lower extremity  Spoke to the surgical team about biopsy and recommended that biopsy is a futile effort as his limb is not salvageable. Recommended amputation but patient was refusing. He is reconsidering this at the moment. Cont' current pain regiment: methadone and  IV morphine prn. Palliative following to also help with pain mangement. Appreciate vascular, gen surg's evaluation   Very much appreciate Dr Rebekah Salas help. Notes reviewed. Hopefully pt will move forward with surgery. ID following for abx     History of traumatic subarachnoid hemorrhage in 2020  Avoid NSAIDs and blood thinners  Continue SCDs for DVT prophylaxis  History of gout and osteoarthritis of both knees   Cont' cochicine. # Chronic methadone use: Continue home dose methadone   # Hypothyroidism: Continue Synthroid  # HTN: Hold BP meds  # Left sided weakness w/ contracture. Chronic         Code Status: Full code  Surrogate Decision Maker:  Ty Bustos Other Relative [phone]      DVT Prophylaxis: SCDs  GI Prophylaxis: not indicated     Baseline: Ambulatory      Discontinue IV fluids today     Subjective:     Chief Complaint / Reason for Physician Visit  No new complaint. Discussed with RN events overnight. Review of Systems:  Symptom Y/N Comments  Symptom Y/N Comments   Fever/Chills n   Chest Pain n    Poor Appetite n   Edema n    Cough n   Abdominal Pain n    Sputum n   Joint Pain     SOB/BECK n   Pruritis/Rash     Nausea/vomit n   Tolerating PT/OT     Diarrhea n   Tolerating Diet     Constipation n   Other       Could NOT obtain due to:      Objective:     VITALS:   Last 24hrs VS reviewed since prior progress note. Most recent are:  Patient Vitals for the past 24 hrs:   Temp Pulse Resp BP SpO2   04/25/21 0751 98.5 °F (36.9 °C) 75 18 (!) 181/93 94 %   04/25/21 0332 98.4 °F (36.9 °C) 61 18 133/70 94 %   04/24/21 2325 98.4 °F (36.9 °C) 85 16 (!) 150/80 97 %   04/24/21 2038 98.1 °F (36.7 °C) 88 18 128/78 98 %   04/24/21 1627 98.4 °F (36.9 °C) 69 16 111/75 93 %       Intake/Output Summary (Last 24 hours) at 4/25/2021 1143  Last data filed at 4/25/2021 1767  Gross per 24 hour   Intake 960 ml   Output 1200 ml   Net -240 ml      Face-to-face encounter was provided on April 19, 2021 with the following findings  PHYSICAL EXAM:  General: WD, WN. Alert, cooperative, uncomfortable in bed    EENT:  EOMI. Anicteric sclerae. MMM  Resp:  CTA bilaterally, no wheezing or rales. No accessory muscle use  CV:  Regular  rhythm,  No edema  GI:  Soft, Non distended, Non tender.  +Bowel sounds  Neurologic:  Alert and oriented X 3, normal speech,   Psych:   Not anxious nor agitated  Skin:  Necrosis and gangrenous looking left lower extremity ulceration.     Reviewed most current lab test results and cultures  YES  Reviewed most current radiology test results   YES  Review and summation of old records today    NO  Reviewed patient's current orders and MAR    YES  PMH/SH reviewed - no change compared to H&P  ________________________________________________________________________  Care Plan discussed with:    Comments   Patient x    Family      RN x    Care Manager     Consultant  x Ortho team                     Multidiciplinary team rounds were held today with , nursing, pharmacist and clinical coordinator. Patient's plan of care was discussed; medications were reviewed and discharge planning was addressed. ________________________________________________________________________  Total NON critical care TIME:  30 Minutes    Total CRITICAL CARE TIME Spent:   Minutes non procedure based      Comments   >50% of visit spent in counseling and coordination of care     ________________________________________________________________________  Hilda Fraga MD     Procedures: see electronic medical records for all procedures/Xrays and details which were not copied into this note but were reviewed prior to creation of Plan. LABS:  I reviewed today's most current labs and imaging studies.   Pertinent labs include:  Recent Labs     04/23/21  0400   WBC 12.0*   HGB 10.8*   HCT 34.1*        Recent Labs     04/23/21  0400   *   K 4.5      CO2 28   GLU 91   BUN 6   CREA 0.44*   CA 8.4*       Signed: Hilda Fraga MD

## 2021-04-25 NOTE — PROGRESS NOTES
End of Shift Note    Bedside shift change report given to Zaki (oncoming nurse) by Charlotte Lester, RN (offgoing nurse).   Report included the following information Kardex, MAR and Recent Results    Shift worked:  7p-7a     Shift summary and any significant changes:     No significantchanges     Concerns for physician to address:       Zone phone for oncoming shift:   [phone]

## 2021-04-25 NOTE — PROGRESS NOTES
End of Shift Note    Bedside shift change report given to [facility] RN (oncoming nurse) by Marley Lloyd RN (offgoing nurse). Report included the following information SBAR, Kardex, Intake/Output, MAR, Accordion, Recent Results and Med Rec Status    Shift worked:  7PM-7AM     Shift summary and any significant changes:     Pt medicated with morphine and tylenol for left leg pain. - Pt  stating that nothing is working and he would appreciate  some more pain medicine.      Concerns for physician to address:  As above     Zone phone for oncoming shift:               Marley Lloyd, RN

## 2021-04-25 NOTE — PROGRESS NOTES
End of Shift Note    Bedside shift change report given to [facility] (oncoming nurse) by Krish Frias RN (offgoing nurse). Report included the following information SBAR, Kardex, Intake/Output and MAR    Shift worked:  7a - 7p     Shift summary and any significant changes:    Wound care done per order.  Large BM this morning     Concerns for physician to address:  none     Zone phone for oncoming shift:   [address], RN

## 2021-04-26 LAB
ANION GAP SERPL CALC-SCNC: 5 MMOL/L (ref 5–15)
BASOPHILS # BLD: 0.1 K/UL (ref 0–0.1)
BASOPHILS NFR BLD: 1 % (ref 0–1)
BUN SERPL-MCNC: 5 MG/DL (ref 6–20)
BUN/CREAT SERPL: 10 (ref 12–20)
CALCIUM SERPL-MCNC: 8.3 MG/DL (ref 8.5–10.1)
CHLORIDE SERPL-SCNC: 105 MMOL/L (ref 97–108)
CO2 SERPL-SCNC: 28 MMOL/L (ref 21–32)
CREAT SERPL-MCNC: 0.49 MG/DL (ref 0.7–1.3)
DIFFERENTIAL METHOD BLD: ABNORMAL
EOSINOPHIL # BLD: 0.7 K/UL (ref 0–0.4)
EOSINOPHIL NFR BLD: 9 % (ref 0–7)
ERYTHROCYTE [DISTWIDTH] IN BLOOD BY AUTOMATED COUNT: 16.5 % (ref 11.5–14.5)
GLUCOSE SERPL-MCNC: 83 MG/DL (ref 65–100)
HCT VFR BLD AUTO: 33.8 % (ref 36.6–50.3)
HGB BLD-MCNC: 10.8 G/DL (ref 12.1–17)
IMM GRANULOCYTES # BLD AUTO: 0.1 K/UL (ref 0–0.04)
IMM GRANULOCYTES NFR BLD AUTO: 1 % (ref 0–0.5)
LYMPHOCYTES # BLD: 1.7 K/UL (ref 0.8–3.5)
LYMPHOCYTES NFR BLD: 20 % (ref 12–49)
MCH RBC QN AUTO: 28.4 PG (ref 26–34)
MCHC RBC AUTO-ENTMCNC: 32 G/DL (ref 30–36.5)
MCV RBC AUTO: 88.9 FL (ref 80–99)
MONOCYTES # BLD: 0.8 K/UL (ref 0–1)
MONOCYTES NFR BLD: 10 % (ref 5–13)
NEUTS SEG # BLD: 5 K/UL (ref 1.8–8)
NEUTS SEG NFR BLD: 59 % (ref 32–75)
NRBC # BLD: 0 K/UL (ref 0–0.01)
NRBC BLD-RTO: 0 PER 100 WBC
PLATELET # BLD AUTO: 270 K/UL (ref 150–400)
PMV BLD AUTO: 8.5 FL (ref 8.9–12.9)
POTASSIUM SERPL-SCNC: 3.8 MMOL/L (ref 3.5–5.1)
RBC # BLD AUTO: 3.8 M/UL (ref 4.1–5.7)
SODIUM SERPL-SCNC: 138 MMOL/L (ref 136–145)
WBC # BLD AUTO: 8.4 K/UL (ref 4.1–11.1)

## 2021-04-26 PROCEDURE — 74011250637 HC RX REV CODE- 250/637: Performed by: INTERNAL MEDICINE

## 2021-04-26 PROCEDURE — 74011250636 HC RX REV CODE- 250/636: Performed by: INTERNAL MEDICINE

## 2021-04-26 PROCEDURE — 36415 COLL VENOUS BLD VENIPUNCTURE: CPT

## 2021-04-26 PROCEDURE — 74011000250 HC RX REV CODE- 250: Performed by: INTERNAL MEDICINE

## 2021-04-26 PROCEDURE — 85025 COMPLETE CBC W/AUTO DIFF WBC: CPT

## 2021-04-26 PROCEDURE — 80048 BASIC METABOLIC PNL TOTAL CA: CPT

## 2021-04-26 PROCEDURE — 74011000258 HC RX REV CODE- 258: Performed by: INTERNAL MEDICINE

## 2021-04-26 PROCEDURE — 2709999900 HC NON-CHARGEABLE SUPPLY

## 2021-04-26 PROCEDURE — 65660000000 HC RM CCU STEPDOWN

## 2021-04-26 RX ADMIN — COLCHICINE 0.6 MG: 0.6 TABLET, FILM COATED ORAL at 08:37

## 2021-04-26 RX ADMIN — VARENICLINE TARTRATE 1 MG: 0.5 TABLET, FILM COATED ORAL at 08:37

## 2021-04-26 RX ADMIN — MORPHINE SULFATE 2 MG: 2 INJECTION, SOLUTION INTRAMUSCULAR; INTRAVENOUS at 22:10

## 2021-04-26 RX ADMIN — METHADONE HYDROCHLORIDE 140 MG: 10 CONCENTRATE ORAL at 10:57

## 2021-04-26 RX ADMIN — TAMSULOSIN HYDROCHLORIDE 0.4 MG: 0.4 CAPSULE ORAL at 08:37

## 2021-04-26 RX ADMIN — MORPHINE SULFATE 2 MG: 2 INJECTION, SOLUTION INTRAMUSCULAR; INTRAVENOUS at 00:40

## 2021-04-26 RX ADMIN — POLYETHYLENE GLYCOL 3350 17 G: 17 POWDER, FOR SOLUTION ORAL at 08:37

## 2021-04-26 RX ADMIN — MEROPENEM 500 MG: 500 INJECTION, POWDER, FOR SOLUTION INTRAVENOUS at 17:45

## 2021-04-26 RX ADMIN — VARENICLINE TARTRATE 1 MG: 0.5 TABLET, FILM COATED ORAL at 17:46

## 2021-04-26 RX ADMIN — ONDANSETRON 4 MG: 2 INJECTION INTRAMUSCULAR; INTRAVENOUS at 16:59

## 2021-04-26 RX ADMIN — MEROPENEM 500 MG: 500 INJECTION, POWDER, FOR SOLUTION INTRAVENOUS at 10:59

## 2021-04-26 RX ADMIN — MORPHINE SULFATE 2 MG: 2 INJECTION, SOLUTION INTRAMUSCULAR; INTRAVENOUS at 10:59

## 2021-04-26 RX ADMIN — GABAPENTIN 100 MG: 100 CAPSULE ORAL at 22:10

## 2021-04-26 RX ADMIN — MORPHINE SULFATE 2 MG: 2 INJECTION, SOLUTION INTRAMUSCULAR; INTRAVENOUS at 06:08

## 2021-04-26 RX ADMIN — MORPHINE SULFATE 2 MG: 2 INJECTION, SOLUTION INTRAMUSCULAR; INTRAVENOUS at 15:21

## 2021-04-26 RX ADMIN — ACETAMINOPHEN 650 MG: 325 TABLET ORAL at 17:45

## 2021-04-26 RX ADMIN — LEVOTHYROXINE SODIUM 112 MCG: 0.11 TABLET ORAL at 06:07

## 2021-04-26 RX ADMIN — Medication 10 ML: at 22:16

## 2021-04-26 RX ADMIN — LINEZOLID 600 MG: 600 INJECTION, SOLUTION INTRAVENOUS at 22:00

## 2021-04-26 RX ADMIN — GABAPENTIN 100 MG: 100 CAPSULE ORAL at 08:37

## 2021-04-26 RX ADMIN — GABAPENTIN 100 MG: 100 CAPSULE ORAL at 15:21

## 2021-04-26 RX ADMIN — LIDOCAINE: 40 CREAM TOPICAL at 10:58

## 2021-04-26 RX ADMIN — MEROPENEM 500 MG: 500 INJECTION, POWDER, FOR SOLUTION INTRAVENOUS at 06:07

## 2021-04-26 RX ADMIN — COLCHICINE 0.6 MG: 0.6 TABLET, FILM COATED ORAL at 17:46

## 2021-04-26 RX ADMIN — LINEZOLID 600 MG: 600 INJECTION, SOLUTION INTRAVENOUS at 08:38

## 2021-04-26 NOTE — PROGRESS NOTES
End of Shift Note    Bedside shift change report given to Analisa LAND (oncoming nurse) by Valdemar Staley RN (offgoing nurse).   Report included the following information SBAR, Kardex, Intake/Output and MAR    Shift worked:  7a-7p     Shift summary and any significant changes:     No significant changes     Concerns for physician to address:  none     Zone phone for oncoming shift:   [phone], RN

## 2021-04-26 NOTE — PROGRESS NOTES
Problem: Falls - Risk of  Goal: *Absence of Falls  Description: Document Denise Granger Fall Risk and appropriate interventions in the flowsheet.   Outcome: Progressing Towards Goal  Note: Fall Risk Interventions:  Mobility Interventions: Patient to call before getting OOB         Medication Interventions: Bed/chair exit alarm, Patient to call before getting OOB    Elimination Interventions: Call light in reach    History of Falls Interventions: Bed/chair exit alarm

## 2021-04-27 PROCEDURE — 2709999900 HC NON-CHARGEABLE SUPPLY

## 2021-04-27 PROCEDURE — 74011250637 HC RX REV CODE- 250/637: Performed by: INTERNAL MEDICINE

## 2021-04-27 PROCEDURE — 74011250636 HC RX REV CODE- 250/636: Performed by: INTERNAL MEDICINE

## 2021-04-27 PROCEDURE — 65660000000 HC RM CCU STEPDOWN

## 2021-04-27 PROCEDURE — 99232 SBSQ HOSP IP/OBS MODERATE 35: CPT | Performed by: INTERNAL MEDICINE

## 2021-04-27 PROCEDURE — 74011000258 HC RX REV CODE- 258: Performed by: INTERNAL MEDICINE

## 2021-04-27 RX ADMIN — COLCHICINE 0.6 MG: 0.6 TABLET, FILM COATED ORAL at 08:52

## 2021-04-27 RX ADMIN — LINEZOLID 600 MG: 600 INJECTION, SOLUTION INTRAVENOUS at 23:00

## 2021-04-27 RX ADMIN — LEVOTHYROXINE SODIUM 112 MCG: 0.11 TABLET ORAL at 05:10

## 2021-04-27 RX ADMIN — MEROPENEM 500 MG: 500 INJECTION, POWDER, FOR SOLUTION INTRAVENOUS at 00:18

## 2021-04-27 RX ADMIN — POLYETHYLENE GLYCOL 3350 17 G: 17 POWDER, FOR SOLUTION ORAL at 08:44

## 2021-04-27 RX ADMIN — MORPHINE SULFATE 2 MG: 2 INJECTION, SOLUTION INTRAMUSCULAR; INTRAVENOUS at 01:26

## 2021-04-27 RX ADMIN — MEROPENEM 500 MG: 500 INJECTION, POWDER, FOR SOLUTION INTRAVENOUS at 22:05

## 2021-04-27 RX ADMIN — VARENICLINE TARTRATE 1 MG: 0.5 TABLET, FILM COATED ORAL at 17:34

## 2021-04-27 RX ADMIN — GABAPENTIN 100 MG: 100 CAPSULE ORAL at 17:34

## 2021-04-27 RX ADMIN — MEROPENEM 500 MG: 500 INJECTION, POWDER, FOR SOLUTION INTRAVENOUS at 14:05

## 2021-04-27 RX ADMIN — METHADONE HYDROCHLORIDE 140 MG: 10 CONCENTRATE ORAL at 08:44

## 2021-04-27 RX ADMIN — ACETAMINOPHEN 650 MG: 325 TABLET ORAL at 22:08

## 2021-04-27 RX ADMIN — MORPHINE SULFATE 2 MG: 2 INJECTION, SOLUTION INTRAMUSCULAR; INTRAVENOUS at 22:03

## 2021-04-27 RX ADMIN — COLCHICINE 0.6 MG: 0.6 TABLET, FILM COATED ORAL at 17:34

## 2021-04-27 RX ADMIN — GABAPENTIN 100 MG: 100 CAPSULE ORAL at 08:44

## 2021-04-27 RX ADMIN — ACETAMINOPHEN 650 MG: 325 TABLET ORAL at 14:04

## 2021-04-27 RX ADMIN — TAMSULOSIN HYDROCHLORIDE 0.4 MG: 0.4 CAPSULE ORAL at 08:43

## 2021-04-27 RX ADMIN — MORPHINE SULFATE 2 MG: 2 INJECTION, SOLUTION INTRAMUSCULAR; INTRAVENOUS at 10:45

## 2021-04-27 RX ADMIN — Medication 10 ML: at 05:10

## 2021-04-27 RX ADMIN — MORPHINE SULFATE 2 MG: 2 INJECTION, SOLUTION INTRAMUSCULAR; INTRAVENOUS at 18:17

## 2021-04-27 RX ADMIN — VARENICLINE TARTRATE 1 MG: 0.5 TABLET, FILM COATED ORAL at 08:52

## 2021-04-27 RX ADMIN — ACETAMINOPHEN 650 MG: 325 TABLET ORAL at 06:06

## 2021-04-27 RX ADMIN — Medication 10 ML: at 22:00

## 2021-04-27 RX ADMIN — LINEZOLID 600 MG: 600 INJECTION, SOLUTION INTRAVENOUS at 08:52

## 2021-04-27 RX ADMIN — GABAPENTIN 100 MG: 100 CAPSULE ORAL at 22:00

## 2021-04-27 RX ADMIN — MEROPENEM 500 MG: 500 INJECTION, POWDER, FOR SOLUTION INTRAVENOUS at 04:26

## 2021-04-27 NOTE — PROGRESS NOTES
Problem: Falls - Risk of  Goal: *Absence of Falls  Description: Document Emma Saeed Fall Risk and appropriate interventions in the flowsheet.   Outcome: Progressing Towards Goal  Note: Fall Risk Interventions:  Mobility Interventions: Communicate number of staff needed for ambulation/transfer, Patient to call before getting OOB, Utilize walker, cane, or other assistive device         Medication Interventions: Patient to call before getting OOB, Teach patient to arise slowly    Elimination Interventions: Call light in reach, Urinal in reach    History of Falls Interventions: Room close to nurse's station

## 2021-04-27 NOTE — PROGRESS NOTES
Transition of Care Plan:     RUR: 27% - moderate  Disposition: TBD - SNF vs HH  Follow up appointments: New PCP appt, Ortho  DME needed: TBD  Transportation at Discharge: Pt's friend  Elli Busing or means to access home:  Pt's friend has access      Caregiver Contact: FriendAyad ([phone])  Discharge Caregiver contacted prior to discharge?  CM will contact pt's cg 24 hours prior to d/c    11:15 AM  CM contacted pt to discuss setting up a new PCP appt. Pt is in agreement with having an appt with a doctor here at the hospital. CM sent an email for an appt to be scheduled. Pt expressed uncertainty with deciding on surgery or not. CM provided pt with active listening and validation of feelings. CM will follow up with pt this afternoon to get an update on his decision.      10:02 AM  CM reviewed chart. Pt is still deciding on if he wants to go forward with surgery. CM will continue to follow for discharge planning while patient is admitted on current unit. Please contact this CM with questions or issues related to discharge.      JUVENCIO Montanez  Care Manager [facility]  [phone]

## 2021-04-27 NOTE — PROGRESS NOTES
Pt with known foot infection and has been aware of need  For lower limb amputation . Having difficulty making   The decision. C/o pain. Discussed the need again. He wants a bit more time. No septic at this time. Continue dressing changes and local wound care  Will await decision. Hopefully infection remains localized to the foot, this was explained to the patient. He understands the potential problems with waiting to long.

## 2021-04-27 NOTE — PROGRESS NOTES
Problem: Falls - Risk of  Goal: *Absence of Falls  Description: Document Jimbo Ike Fall Risk and appropriate interventions in the flowsheet.   Outcome: Progressing Towards Goal  Note: Fall Risk Interventions:  Mobility Interventions: Communicate number of staff needed for ambulation/transfer         Medication Interventions: Evaluate medications/consider consulting pharmacy, Patient to call before getting OOB    Elimination Interventions: Call light in reach    History of Falls Interventions: Evaluate medications/consider consulting pharmacy

## 2021-04-27 NOTE — PROGRESS NOTES
Hospitalist Progress Note    NAME: Rogers Anderson   :  1965   MRN:  [id number]       Assessment / Plan:    Sepsis POA: Temperature 101.4 at rate 108 WBC 25k  Infected extensive chronic  left lower extremity setting of pyoderma gangrenosum  Leukocytosis improving   current antibiotics including Zyvox, and meropenum  Patient is listed allergy to vancomycin  Wound cultures  Culture result: Abnormal       Preliminary   LIGHT ENTEROCOCCUS FAECALIS    Culture result: Abnormal       Preliminary   LIGHT ESCHERICHIA COLI ** (EXTENDED SPECTRUM BETA LACTAMASE ) **    Culture result: Abnormal       Preliminary   LIGHT PSEUDOMONAS AERUGINOSA PIP/JUANITO SENSITIVITY TO FOLLOW    Culture result: Abnormal       Preliminary   LIGHT ALCALIGENES FAECALIS         His biopsy in  showed likely pyoderma gangrenosum  Transition antibiotics to Zyvox and meropenem based on the sensitivities. He wants to continue the current treatment and likely biopsy. He just wants antibiotics which are futile at this point due to severe necrosis of his lower extremity  Spoke to the surgical team about biopsy and recommended that biopsy is a futile effort as his limb is not salvageable. Recommended amputation but patient was refusing. He is reconsidering this at the moment. Cont' current pain regiment: methadone and  IV morphine prn. Palliative following to also help with pain mangement. Appreciate vascular, gen surg's evaluation   Very much appreciate Dr Deb Gutierrez help. Notes reviewed. Hopefully pt will move forward with surgery. ID following for abx     History of traumatic subarachnoid hemorrhage in 2020  Avoid NSAIDs and blood thinners  Continue SCDs for DVT prophylaxis  History of gout and osteoarthritis of both knees   Cont' cochicine. # Chronic methadone use: Continue home dose methadone   # Hypothyroidism: Continue Synthroid  # HTN: Hold BP meds  # Left sided weakness w/ contracture. Chronic         Code Status: Full code  Surrogate Decision Maker:  Luiz Robles Other Relative [phone]      DVT Prophylaxis: SCDs  GI Prophylaxis: not indicated     Baseline: Ambulatory      Discontinue IV fluids today     Subjective:     Chief Complaint / Reason for Physician Visit  No new complaint. Has not decided about surgery. Discussed with RN events overnight. Review of Systems:  Symptom Y/N Comments  Symptom Y/N Comments   Fever/Chills n   Chest Pain n    Poor Appetite n   Edema n    Cough n   Abdominal Pain n    Sputum n   Joint Pain     SOB/BECK n   Pruritis/Rash     Nausea/vomit n   Tolerating PT/OT     Diarrhea n   Tolerating Diet     Constipation n   Other       Could NOT obtain due to:      Objective:     VITALS:   Last 24hrs VS reviewed since prior progress note. Most recent are:  Patient Vitals for the past 24 hrs:   Temp Pulse Resp BP SpO2   04/27/21 1228 98.7 °F (37.1 °C) 67 20 124/67 95 %   04/27/21 0816 98.2 °F (36.8 °C) 63 18 (!) 147/82 95 %   04/27/21 0130 98.4 °F (36.9 °C) 66 18 136/81 95 %   04/26/21 1933 98.5 °F (36.9 °C) 80 17 109/75 93 %   04/26/21 1436 98.8 °F (37.1 °C) 73 16 (!) 156/87 93 %       Intake/Output Summary (Last 24 hours) at 4/27/2021 1327  Last data filed at 4/27/2021 1228  Gross per 24 hour   Intake --   Output 1625 ml   Net -1625 ml      Face-to-face encounter was provided on April 19, 2021 with the following findings  PHYSICAL EXAM:  General: WD, WN. Alert, cooperative, uncomfortable in bed    EENT:  EOMI. Anicteric sclerae. MMM  Resp:  CTA bilaterally, no wheezing or rales. No accessory muscle use  CV:  Regular  rhythm,  No edema  GI:  Soft, Non distended, Non tender.  +Bowel sounds  Neurologic:  Alert and oriented X 3, normal speech,   Psych:   Not anxious nor agitated  Skin:  Necrosis and gangrenous looking left lower extremity ulceration.     Reviewed most current lab test results and cultures  YES  Reviewed most current radiology test results   YES  Review and summation of old records today    NO  Reviewed patient's current orders and MAR    YES  PMH/SH reviewed - no change compared to H&P  ________________________________________________________________________  Care Plan discussed with:    Comments   Patient x    Family      RN x    Care Manager     Consultant  x Ortho team                     Multidiciplinary team rounds were held today with , nursing, pharmacist and clinical coordinator. Patient's plan of care was discussed; medications were reviewed and discharge planning was addressed. ________________________________________________________________________  Total NON critical care TIME:  30 Minutes    Total CRITICAL CARE TIME Spent:   Minutes non procedure based      Comments   >50% of visit spent in counseling and coordination of care     ________________________________________________________________________  Bruce Parr MD     Procedures: see electronic medical records for all procedures/Xrays and details which were not copied into this note but were reviewed prior to creation of Plan. LABS:  I reviewed today's most current labs and imaging studies.   Pertinent labs include:  Recent Labs     04/26/21 0559   WBC 8.4   HGB 10.8*   HCT 33.8*        Recent Labs     04/26/21  0559      K 3.8      CO2 28   GLU 83   BUN 5*   CREA 0.49*   CA 8.3*       Signed: Bruce Parr MD

## 2021-04-27 NOTE — PROGRESS NOTES
Infectious Disease progress      IMPRESSION:   · Sepsis  · Chronic LLE wounds with cellulitis, necrosis,  s/p recent treatment with 5/6 weeks of Zosyn IV at [facility]  · Polymicrobial WC- 4/16+ for -ESBL E. Coli, Pseudomonas, Enterococcus faecalis, Alcaligenes faecalis  · CT- LLE-negative for osteomyelitis   · H/o Pyoderma gangrenosum vs malignancy on pathology in 4/2019  · S/p intra lesional steroids by Dermatology  · Pt has declined amputation that has been recommended by multiple subspecialtues  · Chronic pain syndrome on methadone   · H/o hemorrhagic stroke with residual left sided weakness  · Seizure disorder  · Hypertension stable  · Hypothyroidism on synthroid       PLAN:      · Continue Meropenem( D 9), Zyvox IV(D12) x 2 weeks. · D/w pt again current wound cultures, presence of multiple organisms including ESBL  . Continuing antibiotic therapy without surgical management would be futile, predispose to more resistant organisms, further spread of infection, severe sepsis and life-threatening infection. Patient voiced understanding. Patient seen today. Awake, conversant. Patient asking questions about amputation-phantom pain, level of amputation. Advised patient to discuss with Ortho team.  Discussed with Dr. Mary Nicholson. Tiffany Welch, 63 y.o. male with PMHx significant for prior CVA, seizures, prior DVT, GERD, bladder cancer, chronic nonhealing   wound on his left lower extremity x 2 years , recommendation for BKA by multiple specialties, but pt continues to refuse. Pathology on 4/25/19 was + for Pyoderma gangrenosum vs malignancy. Patient  presented with concerns for worsening infection of the left lower extremity wound. Patient reports that the wound has been present for two and half years . He had previously been on Zosyn iv  via PICC line for reported Pseudomonas and E. coli infection. He has been off antibiotic since the middle of March.  Per ED note Caregiver reported that over the last 24 hours the leg had gotten worse with spreading redness above the area of the chronic wound. Patient had reported chronic pain in the area that is not significantly changed. No fevers at home. No chest pain, shortness of breath, abdominal pain, nausea, vomiting. Per Records pt was admitted at hospital at [place]. 2/9-2/26  [place]ump y.o. male who presented to ED for IV antibiotics he has a chronic left lower extremity wound and history of pyoderma gangrenosum. He has been getting steroid injections and reports that his leg has been doing better however apparently he had a culture done reasons unclear that showed ESBL and Pseudomonas and he was sent in for IV antibiotics. He denies any fevers or chills or worsening of his chronic wound. He has chronic pain which is unchanged. The dermatologist to send him into the hospital was not reachable tonight. ( Dr. Hilton) patient has had this left lower extremity ulceration for over 2 years and maybe it has been worsening but he was a very poor historian. He had been taking steroids every 4 to 6 weeks and thought it was improving and scabbing over and not draining as much       Problems Managed During Hospitalization:  1. Infected Chronic LLE wounds with cellulitis with possible osteomyelitis of the lateral malleolus   Wounds have been present by report for 2 1/2 yrs. -Chronic extensive LLE Pyoderma gangrenosum dx'ed 11/2020 with superimposed infected wounds with cellulitis and CT suggestive of lateral malleolus OM   -PG treated with intralesional steroid injections per Dermatology as OP Dr Hilton  -Unclear if he ever received immunomodulator as his PG appears severe  -His cultures showed pseudomonas and ESBL ( ?colonization vs true infection)Currently on zosyn and ID following  -He could not tolerate MRI x 2 even after benzo for anxiety   -He was seen by vascular and Podiatry, offered BKA.  PATIENT DECLINED AMPUTATION and would like to continue with his dermatologist and plastic  -He had debridements in the past but unclear if it helps or exacerbates PG( Pathergy)  -seen in consult by ID and has PICC placed for prolonged course of IV antibiotics per ID. 6 weeks of antibiotics   -confirmed his desire for no amputation. ID has discussed about possible outcomes including persistence or worsening of infection including sepsis and death despite long-term abx with his decision to not have amputation. He expresses understanding. Pt seen today . Appears drowsy . S/p Wound care . D/w Wound Care . Infected & necrotic wounds present. 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    FH: bladder cancer Z80.52    Left foot infection L08.9    Major depression F97.9    Uncomplicated opioid dependence ([facility]) F11.20    Chronic obstructive pulmonary disease ([facility]) J44.9    Chronic pain disorder G89.4    Hypokalemia E87.6    Seizure disorder ([facility]) G40.909    Tobacco abuse Z72.0    Foot ulcer ([facility]) L97.509    Nonadherence to medical treatment Z91.19    Sinus bradycardia R00.1    Gout M10.9    Hypothyroidism E03.9    Foot infection L08.9    Depression F32.9    Open wound, lower leg S81.809A    Pyoderma gangrenosum L88    Traumatic subarachnoid hemorrhage ([facility] D12.0S4C    Traumatic subarachnoid hemorrhage without loss of consciousness ([facility] S06.6X0A    Infected wound T14. 8XXA, L08.9    Sepsis ([address] A41.9     Past Medical History:   Diagnosis Date    Aneurysm ([address]     (with stroke)    Bladder tumor     Cancer ([address]     bladder CA    Chronic pain     Family history of bladder cancer     GERD (gastroesophageal reflux disease)     Gout     History of vascular access device 04/25/2019    [facility] VAT 4 FR MIDLINE R Cephalic Limited access    History of vascular access device 04/26/2019    4 fr Midline right Cephalic midline access    Hypercholesterolemia     Hypertension     Lesion of bladder     Seizures ([address]     Stroke ([facility] 2006    left sided weakness    Thromboembolus ([facility]     left leg    Thyroid disease     TIA (transient ischemic attack) 2012      Family History   Problem Relation Age of Onset    Diabetes Father     Lung Disease Father     Diabetes Sister     Diabetes Brother     Cancer Paternal Grandfather         Bladder      Social History     Tobacco Use    Smoking status: Current Every Day Smoker     Packs/day: 0.50    Smokeless tobacco: Never Used    Tobacco comment: instructed not to smoke 24 hrs prior surgery   Substance Use Topics    Alcohol use: Yes     Alcohol/week: 6.0 standard drinks     Types: 6 Cans of beer per week     Comment: occasional     Past Surgical History:   Procedure Laterality Date    HX ORTHOPAEDIC      R arthroscopy    HX OTHER SURGICAL      x2 L foot    HX UROLOGICAL  04/20/2018    Cystoscopy, TURBT (greater than 5 cm resected) Dr. Will Waller, [facility].  KNEE ARTHROSCP HARV      left knee    TRANSURETHRAL RESEC BLADDER NECK  08/10/2018      Prior to Admission medications    Medication Sig Start Date End Date Taking? Authorizing Provider   colchicine 0.6 mg tablet Take 0.6 mg by mouth daily as needed for Gout or Pain.  Indications: acute inflammation of the joints due to gout attack   Yes Provider, Historical   varenicline (Chantix Starting Month Box) 0.5 mg (11)- 1 mg (42) DsPk Take  by mouth. Take one tablet by mouth in morning with food for 3 days then increase to 1 tablet by mouth twice daily with food thereafter as directed   Indications: stop smoking   Yes Provider, Historical   tamsulosin (FLOMAX) 0.4 mg capsule Take 1 capsule by mouth daily after dinner  3/2/21  Yes Michaelle Steiner MD   lidocaine (XYLOCAINE) 4 % topical cream Apply  to affected area as needed for Pain (apply to left lower leg wounds during dressing changes). 12/22/20  Yes Asfaw, Sharlet Oppenheim, MD   gabapentin (NEURONTIN) 100 mg capsule Take 100 mg by mouth three (3) times daily. Yes Provider, Historical   levothyroxine (SYNTHROID) 112 mcg tablet Take 112 mcg by mouth Daily (before breakfast). Yes Provider, Historical   lisinopriL (PRINIVIL, ZESTRIL) 20 mg tablet Take 20 mg by mouth daily. Yes Provider, Historical   naproxen sodium (Aleve) 220 mg tablet Take 220 mg by mouth three (3) times daily as needed. Yes Provider, Historical   aspirin 81 mg chewable tablet Take 81 mg by mouth daily. Yes Provider, Historical   pantoprazole (PROTONIX) 40 mg tablet Take 40 mg by mouth daily as needed. 7/17/19  Yes Provider, Historical   allopurinol (ZYLOPRIM) 100 mg tablet Take 1 Tab by mouth daily. Indications: treatment to prevent acute gout attack 5/24/19  Yes Trisha Maguire, RICK   senna-docusate (DARELL-COLACE) 8.6-50 mg per tablet Take 1 Tab by mouth daily as needed. Indications: constipation   Yes Provider, Historical   methadone (DOLOPHINE) 10 mg/mL solution Take 140 mg by mouth daily. Indications: excessive pain   Yes Provider, Historical   melatonin 3 mg tablet Take 1 Tab by mouth nightly as needed.  10/12/18  Yes Nirav Carr MD     Allergies   Allergen Reactions    Sulfamethoxazole-Trimethoprim Rash     L eye and L hand    Ciprofloxacin Hives     Per pcp records    Codeine Hives     Tolerates dilaudid, oxycodone    Cymbalta [Duloxetine] Other (comments)     Confusion and memory loss    Lyrica [Pregabalin] Hives    Sulfa (Sulfonamide Antibiotics) Rash    Ultram [Tramadol] Hives    Vancomycin Shortness of Breath    Zosyn [Piperacillin-Tazobactam] Rash        Review of Systems:  A comprehensive review of systems was negative except for that written in the History of Present Illness. 10 point review of systems obtained . All other systems negative    Objective:   Blood pressure 117/82, pulse 80, temperature 98.5 °F (36.9 °C), resp. rate 22, height 6' 1\" (1.854 m), weight 253 lb 1.4 oz (114.8 kg), SpO2 93 %. Temp (24hrs), Av.5 °F (36.9 °C), Min:98.2 °F (36.8 °C), Max:98.7 °F (37.1 °C)    Current Facility-Administered Medications   Medication Dose Route Frequency    meropenem (MERREM) 500 mg in 0.9% sodium chloride (MBP/ADV) 50 mL MBP  500 mg IntraVENous Q6H    colchicine tablet 0.6 mg  0.6 mg Oral BID    linezolid in dextrose 5% (ZYVOX) IVPB premix in D5W 600 mg  600 mg IntraVENous Q12H    morphine injection 2 mg  2 mg IntraVENous Q4H PRN    acetaminophen (TYLENOL) tablet 650 mg  650 mg Oral Q6H PRN    ondansetron (ZOFRAN) injection 4 mg  4 mg IntraVENous Q6H PRN    polyethylene glycol (MIRALAX) packet 17 g  17 g Oral DAILY    levothyroxine (SYNTHROID) tablet 112 mcg  112 mcg Oral ACB    lidocaine (XYLOCAINE) 4 % cream   Topical DAILY    sodium chloride (NS) flush 5-10 mL  5-10 mL IntraVENous PRN    gabapentin (NEURONTIN) capsule 100 mg  100 mg Oral TID    melatonin tablet 3 mg  3 mg Oral QHS PRN    tamsulosin (FLOMAX) capsule 0.4 mg  0.4 mg Oral DAILY    methadone (DOLOPHINE) 10 mg/mL concentrated solution 140 mg  140 mg Oral DAILY    varenicline (CHANTIX) 0.5 mg tablet 1 mg  1 mg Oral BIDPC        Exam:    General:  Awake, drowsy ,cooperative,    Eyes:  Sclera anicteric. Pupils equally round and reactive to light.    Mouth/Throat: Mucous membranes , oral pharynx mildly dry   Neck: Supple   Lungs:   Reduced auscultation bilaterally, good effort   CV:  Regular rate and rhythm,no murmur, click, rub or gallop   Abdomen:   Soft, non-tender. bowel sounds normal. non-distended   Extremities: No  edema   Skin: Skin color, texture, turgor normal. no acute rash or lesions   Lymph nodes: Cervical and supraclavicular normal   Musculoskeletal: LLE dressing +   Lines/Devices:  Intact, no erythema, drainage or tenderness   Psych: Awake  and oriented, normal mood affect        Data Review:   CBC:   Recent Labs     04/26/21  0559   WBC 8.4   RBC 3.80*   HGB 10.8*   HCT 33.8*      GRANS 59   LYMPH 20   EOS 9*     CMP:   Recent Labs     04/26/21  0559   GLU 83      K 3.8      CO2 28   BUN 5*   CREA 0.49*   CA 8.3*   AGAP 5   BUCR 10*       Lab Results   Component Value Date/Time    Culture result: NO ANAEROBES ISOLATED 04/16/2021 02:36 PM    Culture result: NO FUNGUS ISOLATED 3 DAYS 04/16/2021 02:36 PM    Culture result: LIGHT ENTEROCOCCUS FAECALIS (A) 04/16/2021 02:35 PM    Culture result: (A) 04/16/2021 02:35 PM     LIGHT ESCHERICHIA COLI ** (EXTENDED SPECTRUM BETA LACTAMASE ) **    Culture result: (A) 04/16/2021 02:35 PM     LIGHT PSEUDOMONAS AERUGINOSA PIP/JUANITO = 19mm = INTERMEDIATE    Culture result: LIGHT ALCALIGENES FAECALIS (A) 04/16/2021 02:35 PM          XR Results (most recent):  Results from Hospital Encounter encounter on 04/15/21   XR ANKLE LT AP/LAT    Narrative EXAM: XR ANKLE LT AP/LAT    INDICATION: Trauma. COMPARISON: None. FINDINGS: Two views of the left ankle demonstrate diffuse soft tissue swelling  and osteopenia and mild DJD, no definite fracture. Impression  impression: No acute bone findings suspected. ICD-10-CM ICD-9-CM    1. Sepsis, due to unspecified organism, unspecified whether acute organ dysfunction present ([facility]  A41.9 038.9      995.91    2. Cellulitis of left lower extremity  L03.116 682.6    3. Wound of left foot  S91.302A 892.0    4. History of bladder cancer  Z85.51 V10.51    5.  Multiple open wounds of lower leg, left, initial encounter  S81.802A 894.0    6. Subarachnoid hemorrhage (HCC)  I60.9 430    7. Infected wound  T14. 8XXA 958.3     L08.9     8. Brain aneurysm  I67.1 437.3    9. Cerebral hemorrhage with hemiparesis (HCC)  I61.9 431     G81.90 342.90    10. Acquired hypothyroidism  E03.9 244.9    11. Chronic pain disorder  G89.4 338.4    12. Long term current use of methadone for pain control  Z79.891 V58.69    13. Foot infection  L08.9 686.9    14. Physical debility  R53.81 799.3    15. Enterococcus faecalis infection  B95.2 041.04    16. History of pyoderma gangrenosum  Z87.2 V13.3    17. Infection due to ESBL-producing Escherichia coli  A49.8 041.49     Z16.12 V09.1    18. Osteomyelitis of lower leg, left, acute ([facility])  M86.162 730.06    19. Pseudomonas infection  A49.8 041.7    20. Sepsis without acute organ dysfunction, due to unspecified organism ([facility]  A41.9 038.9      995.91    21. Transient weakness of left lower extremity  R29.898 729.89    22. Central pain syndrome  G89.0 338.0    23. Seizure disorder ([facility]  G40.909 345.90      Antibiotic History  Linezolid 600 mg PO BID (Start Date 4/15)  Meropenem ( start date 4/19)       S/p   Cefepime 2 g IV Q8H (Start Date 4/15- 4/19)  Metronidazole 500 mg IV Q8H (Start Date 4/15-4/19    Zosyn x1  Vancomycin - pharmacy to dose        S/p Zosyn IV - x 5 weeks , last dose 3/21/21  I have discussed the diagnosis with the patient and the intended plan as seen in the above orders. I have discussed medication side effects and warnings with the patient as well.     Reviewed test results at length with patient    Signed By: Glenny Cuadra MD FACP

## 2021-04-27 NOTE — PROGRESS NOTES
End of Shift Note    Bedside shift change report given to Yolanda Painting (oncoming nurse) by Emir Patino RN (offgoing nurse).   Report included the following information Kardex and MAR    Shift worked:  7p-7a     Shift summary and any significant changes:     no significant changes     Concerns for physician to address:     Zone phone for oncoming shift:   [phone]       A

## 2021-04-27 NOTE — PROGRESS NOTES
End of Shift Note    Bedside shift change report given to Geni (oncoming nurse) by Silvio Grace RN (offgoing nurse).   Report included the following information Kardex, MAR and Recent Results    Shift worked:  7p-7a     Shift summary and any significant changes:     no significant changes     Concerns for physician to address:     Zone phone for oncoming shift:   [phone]

## 2021-04-28 PROCEDURE — 74011000250 HC RX REV CODE- 250: Performed by: INTERNAL MEDICINE

## 2021-04-28 PROCEDURE — 65660000000 HC RM CCU STEPDOWN

## 2021-04-28 PROCEDURE — 2709999900 HC NON-CHARGEABLE SUPPLY

## 2021-04-28 PROCEDURE — 74011250636 HC RX REV CODE- 250/636: Performed by: INTERNAL MEDICINE

## 2021-04-28 PROCEDURE — 74011250637 HC RX REV CODE- 250/637: Performed by: INTERNAL MEDICINE

## 2021-04-28 PROCEDURE — 99233 SBSQ HOSP IP/OBS HIGH 50: CPT | Performed by: ORTHOPAEDIC SURGERY

## 2021-04-28 PROCEDURE — 74011000258 HC RX REV CODE- 258: Performed by: INTERNAL MEDICINE

## 2021-04-28 RX ORDER — LISINOPRIL 20 MG/1
20 TABLET ORAL DAILY
Status: DISCONTINUED | OUTPATIENT
Start: 2021-04-28 | End: 2021-04-29 | Stop reason: HOSPADM

## 2021-04-28 RX ADMIN — MEROPENEM 500 MG: 500 INJECTION, POWDER, FOR SOLUTION INTRAVENOUS at 19:47

## 2021-04-28 RX ADMIN — COLCHICINE 0.6 MG: 0.6 TABLET, FILM COATED ORAL at 17:39

## 2021-04-28 RX ADMIN — LINEZOLID 600 MG: 600 INJECTION, SOLUTION INTRAVENOUS at 20:33

## 2021-04-28 RX ADMIN — GABAPENTIN 100 MG: 100 CAPSULE ORAL at 21:39

## 2021-04-28 RX ADMIN — Medication 10 ML: at 19:47

## 2021-04-28 RX ADMIN — MEROPENEM 500 MG: 500 INJECTION, POWDER, FOR SOLUTION INTRAVENOUS at 14:57

## 2021-04-28 RX ADMIN — LEVOTHYROXINE SODIUM 112 MCG: 0.11 TABLET ORAL at 05:06

## 2021-04-28 RX ADMIN — GABAPENTIN 100 MG: 100 CAPSULE ORAL at 09:15

## 2021-04-28 RX ADMIN — ACETAMINOPHEN 650 MG: 325 TABLET ORAL at 19:21

## 2021-04-28 RX ADMIN — METHADONE HYDROCHLORIDE 140 MG: 10 CONCENTRATE ORAL at 09:16

## 2021-04-28 RX ADMIN — LIDOCAINE: 40 CREAM TOPICAL at 14:48

## 2021-04-28 RX ADMIN — LISINOPRIL 20 MG: 20 TABLET ORAL at 09:15

## 2021-04-28 RX ADMIN — MORPHINE SULFATE 2 MG: 2 INJECTION, SOLUTION INTRAMUSCULAR; INTRAVENOUS at 20:33

## 2021-04-28 RX ADMIN — POLYETHYLENE GLYCOL 3350 17 G: 17 POWDER, FOR SOLUTION ORAL at 09:16

## 2021-04-28 RX ADMIN — MEROPENEM 500 MG: 500 INJECTION, POWDER, FOR SOLUTION INTRAVENOUS at 09:16

## 2021-04-28 RX ADMIN — VARENICLINE TARTRATE 1 MG: 0.5 TABLET, FILM COATED ORAL at 09:27

## 2021-04-28 RX ADMIN — Medication 3 MG: at 22:49

## 2021-04-28 RX ADMIN — MEROPENEM 500 MG: 500 INJECTION, POWDER, FOR SOLUTION INTRAVENOUS at 03:33

## 2021-04-28 RX ADMIN — ACETAMINOPHEN 650 MG: 325 TABLET ORAL at 06:38

## 2021-04-28 RX ADMIN — LINEZOLID 600 MG: 600 INJECTION, SOLUTION INTRAVENOUS at 12:50

## 2021-04-28 RX ADMIN — MORPHINE SULFATE 2 MG: 2 INJECTION, SOLUTION INTRAMUSCULAR; INTRAVENOUS at 04:18

## 2021-04-28 RX ADMIN — COLCHICINE 0.6 MG: 0.6 TABLET, FILM COATED ORAL at 09:27

## 2021-04-28 RX ADMIN — VARENICLINE TARTRATE 1 MG: 0.5 TABLET, FILM COATED ORAL at 17:39

## 2021-04-28 RX ADMIN — MORPHINE SULFATE 2 MG: 2 INJECTION, SOLUTION INTRAMUSCULAR; INTRAVENOUS at 14:47

## 2021-04-28 RX ADMIN — GABAPENTIN 100 MG: 100 CAPSULE ORAL at 17:34

## 2021-04-28 RX ADMIN — TAMSULOSIN HYDROCHLORIDE 0.4 MG: 0.4 CAPSULE ORAL at 09:15

## 2021-04-28 NOTE — PROGRESS NOTES
Hospitalist Progress Note    NAME: Amy Tesfaye   :  1965   MRN:  [id number]       Assessment / Plan:  Sepsis POA: Temperature 101.4 at rate 108 WBC 25k  Infected extensive chronic  left lower extremity setting of pyoderma gangrenosum  -leukocytosis resolved  current antibiotics including Zyvox, and meropenum  -ID consult appreciated. Patient has been advised by hospitalist and multiple specialists that surgery is the only meaningful treatment options and his LLE requires probably AKA to treat his infection  -patient has had difficulty coming to a decision  Patient is listed allergy to vancomycin  -wound cultures + light enterococcus faecalis, light E coli, light pseudomonas aeruginosa, light alcaligenes faecalis  -biopsy in 2019 showed likely pyoderma gangrenosum  -severe diffuse necrosis of LLE  -appreciate input of specialists: surgery, vascular surgery, ortho surgery, ID, palliative team    -I further discussed plan of care with the patient today . He seems to understand the need for surgery. I have told him we will need to come to a decision and that I will follow up in the next 24hrs to see if he has come to a decision     History of traumatic subarachnoid hemorrhage in 2020  Avoid NSAIDs and blood thinners  Continue SCDs for DVT prophylaxis    History of gout and osteoarthritis of both knees   -Cont' cochicine.       Chronic methadone use: Continue home dose methadone   -appreciate palliative input    Hypothyroidism  -continue Synthroid    HTN  -resume home lisinopril    Left sided weakness w/ contracture  -chronic/stable         Code Status: Full code  Surrogate Decision Maker: Meli Kailee [phone]    DVT Prophylaxis: SCDs  GI Prophylaxis: not indicated     Baseline: Ambulatory     Subjective:     Chief Complaint / Reason for Physician Visit  No new complaints    Discussed with RN events overnight.      Review of Systems:  Symptom Y/N Comments  Symptom Y/N Comments   Fever/Chills n   Chest Pain n    Poor Appetite n   Edema     Cough n   Abdominal Pain n    Sputum n   Joint Pain  +LLE pain   SOB/BECK n   Pruritis/Rash     Nausea/vomit n   Tolerating PT/OT     Diarrhea n   Tolerating Diet     Constipation n   Other       Could NOT obtain due to:      Objective:     VITALS:   Last 24hrs VS reviewed since prior progress note. Most recent are:  Patient Vitals for the past 24 hrs:   Temp Pulse Resp BP SpO2   04/28/21 0844 98.1 °F (36.7 °C) 71 19 (!) 147/81 95 %   04/28/21 0417 98 °F (36.7 °C) 66 18 124/77 95 %   04/28/21 0029 97.5 °F (36.4 °C) 66 19 129/82 94 %   04/27/21 2124 97.8 °F (36.6 °C) 70 20 126/76 94 %   04/27/21 1459 98.5 °F (36.9 °C) 80 22 117/82 93 %   04/27/21 1228 98.7 °F (37.1 °C) 67 20 124/67 95 %       Intake/Output Summary (Last 24 hours) at 4/28/2021 0853  Last data filed at 4/27/2021 1228  Gross per 24 hour   Intake --   Output 700 ml   Net -700 ml        I had a face to face encounter and independently examined this patient on 4/28/2021, as outlined below:  PHYSICAL EXAM:  General: WD, WN. Alert, cooperative, no acute distress    EENT:  EOMI. Anicteric sclerae. MMM  Resp:  CTA bilaterally, no wheezing or rales. No accessory muscle use  CV:  Regular  rhythm,  No edema  GI:  Soft, Non distended, Non tender.   +Bowel sounds  Neurologic:  Alert and oriented X 3, normal speech,   Psych:   Not anxious nor agitated  Skin:  LLE with necrosis/gangrene ulcerations    Reviewed most current lab test results and cultures  YES  Reviewed most current radiology test results   YES  Review and summation of old records today    NO  Reviewed patient's current orders and MAR    YES  PMH/SH reviewed - no change compared to H&P  ________________________________________________________________________  Care Plan discussed with:    Comments   Patient x    Family      RN x    Care Manager x    Consultant  x Dr Jeanmarie Matthew                    x Multidiciplinary team rounds were held today with case manager, nursing, pharmacist and clinical coordinator. Patient's plan of care was discussed; medications were reviewed and discharge planning was addressed. ________________________________________________________________________  Total NON critical care TIME:  35   Minutes    Total CRITICAL CARE TIME Spent:   Minutes non procedure based      Comments   >50% of visit spent in counseling and coordination of care     ________________________________________________________________________  Nilsa Rivero DO     Procedures: see electronic medical records for all procedures/Xrays and details which were not copied into this note but were reviewed prior to creation of Plan. LABS:  I reviewed today's most current labs and imaging studies.   Pertinent labs include:  Recent Labs     04/26/21  0559   WBC 8.4   HGB 10.8*   HCT 33.8*        Recent Labs     04/26/21  0559      K 3.8      CO2 28   GLU 83   BUN 5*   CREA 0.49*   CA 8.3*       Signed: Nilsa Rivero DO

## 2021-04-28 NOTE — PROGRESS NOTES
Transition of Care Plan:     RUR: 27% - moderate  Disposition: TBD - Likely home with family assistance  Follow up appointments: New PCP appt, Ortho  DME needed: TBD  Transportation at Discharge: Pt's friend  [address] or means to access home:  Pt's friend has access      Caregiver Contact: Friend, Say Canela ([phone])  Discharge Caregiver contacted prior to discharge?  CM will contact pt's cg 24 hours prior to d/c    CM contacted pt to discuss anticipated d/c plan for tomorrow. Pt reports he still has not made a decision on going forward with surgery or not. [facility]. Pt will likely need HH for SN due to needed wound care. Pt reports he did not know what address he would d/c to. CM asked if she could contact his cg Dallin to get information from her. Pt declined and reports he will updated CM in the morning. CM will continue to follow for discharge planning while patient is admitted on current unit. Please contact this CM with questions or issues related to discharge.      Hoda Gutierrez, MSW  Care Manager [facility]  [phone]

## 2021-04-28 NOTE — PROGRESS NOTES
Comprehensive Nutrition Assessment    Type and Reason for Visit: Reassess    Nutrition Recommendations/Plan:   Continue regular diet  Add ensure clear and ensure compact daily     Nutrition Assessment:     Chart reviewed; medically noted for sepsis and infected wound. Patient continues to weigh his decision on whether to proceed with amputation or not. He reports his appetite isn't great all the time and has been fluctuating recently due to depression. He only had a sandwich and brownie ordered for lunch. Requesting protein supplements. Will add ensure clear and ensure compact for patient to try. Encouraged intake of meals. Patient Vitals for the past 168 hrs:   % Diet Eaten   04/24/21 1810 76 - 100%   04/24/21 1404 76 - 100%   04/24/21 0922 76 - 100%     Estimated Daily Nutrient Needs:  Energy (kcal): 2141 kcal (BMR 2054 x 1. 2AF -300kcal); Weight Used for Energy Requirements: Current  Protein (g): 92-115g (0.8-1.0 g/kg bw); Weight Used for Protein Requirements: Current  Fluid (ml/day): 2150 mL; Method Used for Fluid Requirements: 1 ml/kcal    Nutrition Related Findings:       BM 4/25  Synthroid, Lisinopril, Miralax     Wounds:    Left food open wounds, amputation recommended     Current Nutrition Therapies:  DIET REGULAR    Anthropometric Measures:  · Height:  6' 1\" (185.4 cm)  · Current Body Wt:  115.5 kg (254 lb 10.1 oz)    · BMI Category:  Obese class 1 (BMI 30.0-34. 9)       Nutrition Diagnosis:   No nutrition diagnosis at this time     Nutrition Interventions:   Food and/or Nutrient Delivery: Continue current diet, Start oral nutrition supplement  Nutrition Education and Counseling: No recommendations at this time  Coordination of Nutrition Care: No recommendation at this time    Goals:  PO intake >70% of meals/supplement next 2-4 days       Nutrition Monitoring and Evaluation:   Behavioral-Environmental Outcomes: None identified  Food/Nutrient Intake Outcomes: Food and nutrient intake, Supplement intake  Physical Signs/Symptoms Outcomes: Biochemical data, Weight    Discharge Planning:    Continue current diet     Electronically signed by Isabela Brown RD on 4/28/2021 at 12:43 PM    Contact: ext [phone]

## 2021-04-28 NOTE — PROGRESS NOTES
S: No complaints, no acute events. O:   Visit Vitals  /77 (BP 1 Location: Right arm, BP Patient Position: At rest)   Pulse 66   Temp 98 °F (36.7 °C)   Resp 18   Ht 6' 1\" (1.854 m)   Wt 254 lb 9.6 oz (115.5 kg)   SpO2 95%   BMI 33.59 kg/m²     AOX3, NAD  Mood appropriate, affect appropriate  Dressing C/D/I        A: Chronic left leg ulceration    P:  This patient and I had a very long discussion regarding his treatment options, he continues to be unsure whether or not he would want surgery. I discussed with him at length the anatomy, the thought process between performing a through knee versus above-the-knee amputation. I discussed the injury planes of the soft tissues as well as the healing potential.  We discussed the postoperative plan should we perform surgery, we discussed the concept of phantom pain as well as any potential issues that might arise from surgery. We also discussed the general recovery from something like this, understanding that this is altered by his stroke in the past.  In general, we discussed the pros and cons of this at length again. He stated that he had no further questions when I exited the room, he stated that he would speak with his caretaker, Cara Parham, who I have spoken with on the phone for over 30 minutes in the past regarding this. He stated that he had no decision at this point. Time in consultation: 45 minutes.   DVT prophylaxis  D/C planning

## 2021-04-28 NOTE — PROGRESS NOTES
End of Shift Note    Bedside shift change report given to [employer] (oncoming nurse) by Franco More (offgoing nurse).   Report included the following information SBAR, Kardex, Procedure Summary, Intake/Output, MAR and Recent Results    Shift worked:  7-7pm     Shift summary and any significant changes:     No significant changes    Concerns for physician to address:  discussed on roundings   Zone phone for oncoming shift:            Franco More

## 2021-04-28 NOTE — PROGRESS NOTES
Hospitalist Progress Note    NAME: Monika Arana   :  1965   MRN:  [id number]       Assessment / Plan:  Sepsis POA: Temperature 101.4 at rate 108 WBC 25k  Infected extensive chronic  left lower extremity setting of pyoderma gangrenosum  -leukocytosis resolved  current antibiotics including Zyvox, and meropenum  -ID consult appreciated. Patient has been advised by hospitalist and multiple specialists that surgery is the only meaningful treatment options and his LLE requires probably AKA to treat his infection  -patient has had difficulty coming to a decision  Patient is listed allergy to vancomycin  -wound cultures + light enterococcus faecalis, light E coli, light pseudomonas aeruginosa, light alcaligenes faecalis  -biopsy in 2019 showed likely pyoderma gangrenosum  -severe diffuse necrosis of LLE  -appreciate input of specialists: surgery, vascular surgery, ortho surgery, ID, palliative team.     -I further discussed plan of care with the patient today . He seems to understand the need for surgery. I have told him we will need to come to a decision and that I will follow up in the next 24hrs to see if he has come to a decision    Note that Dr Yuniel Hernandez again went over treatment options with patient. Appreciate his time and efforts. Unfortunately the patient continues to have difficulty arriving at a decision regarding surgery. His caretaker seems fixated on pursuing a biopsy of his wound to eval for pyoderma gangrenosa. Given multiorganism infection, duration of infection, sepsis and risk of infection progression, it's felt that pursuing bx would be futile/unlikely to  of his current issue.  I have discussed that if the patient is not ready for surgery and unable to come to decision regarding surgery in the very near future, it will be necessary to discharge him from the hospital.     History of traumatic subarachnoid hemorrhage in 2020  Avoid NSAIDs and blood thinners  Continue SCDs for DVT prophylaxis    History of gout and osteoarthritis of both knees   -Cont' cochicine.       Chronic methadone use: Continue home dose methadone   -appreciate palliative input    Hypothyroidism  -continue Synthroid    HTN  -resume home lisinopril    Left sided weakness w/ contracture  -chronic/stable         Code Status: Full code  Surrogate Decision Maker: Nella Koyanagi [phone]    DVT Prophylaxis: SCDs  GI Prophylaxis: not indicated     Baseline: Ambulatory     Subjective:     Chief Complaint / Reason for Physician Visit  No change in status    Discussed with RN events overnight. Review of Systems:  Symptom Y/N Comments  Symptom Y/N Comments   Fever/Chills n   Chest Pain n    Poor Appetite n   Edema     Cough n   Abdominal Pain n    Sputum n   Joint Pain  +LLE pain   SOB/BECK n   Pruritis/Rash     Nausea/vomit n   Tolerating PT/OT     Diarrhea n   Tolerating Diet     Constipation n   Other       Could NOT obtain due to:      Objective:     VITALS:   Last 24hrs VS reviewed since prior progress note. Most recent are:  Patient Vitals for the past 24 hrs:   Temp Pulse Resp BP SpO2   04/28/21 0844 98.1 °F (36.7 °C) 71 19 (!) 147/81 95 %   04/28/21 0417 98 °F (36.7 °C) 66 18 124/77 95 %   04/28/21 0029 97.5 °F (36.4 °C) 66 19 129/82 94 %   04/27/21 2124 97.8 °F (36.6 °C) 70 20 126/76 94 %   04/27/21 1459 98.5 °F (36.9 °C) 80 22 117/82 93 %   04/27/21 1228 98.7 °F (37.1 °C) 67 20 124/67 95 %       Intake/Output Summary (Last 24 hours) at 4/28/2021 0908  Last data filed at 4/27/2021 1228  Gross per 24 hour   Intake --   Output 700 ml   Net -700 ml        I had a face to face encounter and independently examined this patient on 4/28/2021, as outlined below:  PHYSICAL EXAM:  General: WD, WN. Alert, cooperative, no acute distress    EENT:  EOMI. Anicteric sclerae. MMM  Resp:  CTA bilaterally, no wheezing or rales.   No accessory muscle use  CV:  Regular  rhythm,  No edema  GI:  Soft, Non distended, Non tender. +Bowel sounds  Neurologic:  Alert and oriented X 3, normal speech,   Psych:   Not anxious nor agitated  Skin:  LLE with necrosis/gangrene ulcerations    Reviewed most current lab test results and cultures  YES  Reviewed most current radiology test results   YES  Review and summation of old records today    NO  Reviewed patient's current orders and MAR    YES  PMH/SH reviewed - no change compared to H&P  ________________________________________________________________________  Care Plan discussed with:    Comments   Patient x    Family      RN x    Care Manager x    Consultant  x Dr Jos Menezes                    x Multidiciplinary team rounds were held today with , nursing, pharmacist and clinical coordinator. Patient's plan of care was discussed; medications were reviewed and discharge planning was addressed. ________________________________________________________________________  Total NON critical care TIME:  35   Minutes    Total CRITICAL CARE TIME Spent:   Minutes non procedure based      Comments   >50% of visit spent in counseling and coordination of care     ________________________________________________________________________  Johnice Bame, DO     Procedures: see electronic medical records for all procedures/Xrays and details which were not copied into this note but were reviewed prior to creation of Plan. LABS:  I reviewed today's most current labs and imaging studies.   Pertinent labs include:  Recent Labs     04/26/21 0559   WBC 8.4   HGB 10.8*   HCT 33.8*        Recent Labs     04/26/21 0559      K 3.8      CO2 28   GLU 83   BUN 5*   CREA 0.49*   CA 8.3*       Signed: Garth Bauer DO

## 2021-04-29 VITALS
SYSTOLIC BLOOD PRESSURE: 160 MMHG | HEART RATE: 62 BPM | DIASTOLIC BLOOD PRESSURE: 73 MMHG | TEMPERATURE: 98.3 F | HEIGHT: 73 IN | RESPIRATION RATE: 18 BRPM | WEIGHT: 254.6 LBS | OXYGEN SATURATION: 91 % | BODY MASS INDEX: 33.74 KG/M2

## 2021-04-29 LAB
ANION GAP SERPL CALC-SCNC: 4 MMOL/L (ref 5–15)
BUN SERPL-MCNC: 6 MG/DL (ref 6–20)
BUN/CREAT SERPL: 12 (ref 12–20)
CALCIUM SERPL-MCNC: 8.7 MG/DL (ref 8.5–10.1)
CHLORIDE SERPL-SCNC: 102 MMOL/L (ref 97–108)
CO2 SERPL-SCNC: 30 MMOL/L (ref 21–32)
CREAT SERPL-MCNC: 0.52 MG/DL (ref 0.7–1.3)
GLUCOSE SERPL-MCNC: 102 MG/DL (ref 65–100)
POTASSIUM SERPL-SCNC: 3.9 MMOL/L (ref 3.5–5.1)
SODIUM SERPL-SCNC: 136 MMOL/L (ref 136–145)

## 2021-04-29 PROCEDURE — 2709999900 HC NON-CHARGEABLE SUPPLY

## 2021-04-29 PROCEDURE — 80048 BASIC METABOLIC PNL TOTAL CA: CPT

## 2021-04-29 PROCEDURE — 36415 COLL VENOUS BLD VENIPUNCTURE: CPT

## 2021-04-29 PROCEDURE — 74011250637 HC RX REV CODE- 250/637: Performed by: INTERNAL MEDICINE

## 2021-04-29 PROCEDURE — 74011000258 HC RX REV CODE- 258: Performed by: INTERNAL MEDICINE

## 2021-04-29 PROCEDURE — 74011250636 HC RX REV CODE- 250/636: Performed by: INTERNAL MEDICINE

## 2021-04-29 PROCEDURE — 74011000250 HC RX REV CODE- 250: Performed by: INTERNAL MEDICINE

## 2021-04-29 RX ADMIN — LINEZOLID 600 MG: 600 INJECTION, SOLUTION INTRAVENOUS at 08:07

## 2021-04-29 RX ADMIN — GABAPENTIN 100 MG: 100 CAPSULE ORAL at 08:09

## 2021-04-29 RX ADMIN — MORPHINE SULFATE 2 MG: 2 INJECTION, SOLUTION INTRAMUSCULAR; INTRAVENOUS at 08:09

## 2021-04-29 RX ADMIN — TAMSULOSIN HYDROCHLORIDE 0.4 MG: 0.4 CAPSULE ORAL at 08:09

## 2021-04-29 RX ADMIN — MORPHINE SULFATE 2 MG: 2 INJECTION, SOLUTION INTRAMUSCULAR; INTRAVENOUS at 01:54

## 2021-04-29 RX ADMIN — ACETAMINOPHEN 650 MG: 325 TABLET ORAL at 10:29

## 2021-04-29 RX ADMIN — Medication 10 ML: at 14:45

## 2021-04-29 RX ADMIN — ACETAMINOPHEN 650 MG: 325 TABLET ORAL at 15:21

## 2021-04-29 RX ADMIN — GABAPENTIN 100 MG: 100 CAPSULE ORAL at 15:38

## 2021-04-29 RX ADMIN — LEVOTHYROXINE SODIUM 112 MCG: 0.11 TABLET ORAL at 05:34

## 2021-04-29 RX ADMIN — ACETAMINOPHEN 650 MG: 325 TABLET ORAL at 03:56

## 2021-04-29 RX ADMIN — LISINOPRIL 20 MG: 20 TABLET ORAL at 08:09

## 2021-04-29 RX ADMIN — METHADONE HYDROCHLORIDE 140 MG: 10 CONCENTRATE ORAL at 08:10

## 2021-04-29 RX ADMIN — LIDOCAINE: 40 CREAM TOPICAL at 10:25

## 2021-04-29 RX ADMIN — COLCHICINE 0.6 MG: 0.6 TABLET, FILM COATED ORAL at 08:09

## 2021-04-29 RX ADMIN — MEROPENEM 500 MG: 500 INJECTION, POWDER, FOR SOLUTION INTRAVENOUS at 01:56

## 2021-04-29 RX ADMIN — MORPHINE SULFATE 2 MG: 2 INJECTION, SOLUTION INTRAMUSCULAR; INTRAVENOUS at 14:45

## 2021-04-29 RX ADMIN — MEROPENEM 500 MG: 500 INJECTION, POWDER, FOR SOLUTION INTRAVENOUS at 10:20

## 2021-04-29 RX ADMIN — VARENICLINE TARTRATE 1 MG: 0.5 TABLET, FILM COATED ORAL at 08:09

## 2021-04-29 RX ADMIN — MEROPENEM 500 MG: 500 INJECTION, POWDER, FOR SOLUTION INTRAVENOUS at 14:47

## 2021-04-29 RX ADMIN — POLYETHYLENE GLYCOL 3350 17 G: 17 POWDER, FOR SOLUTION ORAL at 08:09

## 2021-04-29 NOTE — PROGRESS NOTES
Transition of Care Plan:     RUR: 23% - moderate  Disposition: TBD - Likely home with family assistance  Follow up appointments: New PCP appt, Ortho  DME needed: TBD  Transportation at Discharge: Pt's friend  [address] or means to access home:  Pt's friend has access      Caregiver Contact: Friend, Adolfo Rogers ([phone])  Discharge Caregiver contacted prior to discharge?  CM will contact pt's cg 24 hours prior to d/c    3:20 PM  Pt requested BLS transport be canceled. Pt reports his cg will transport him home however will not tell CM an exact time. Pt is requesting list of medications outside of what the d/c summary will provide. CM told pt he can request his records through the medical records department. Pt declined that suggestion. CM offered to fax [facility] and pt reported no that he wanted the paperwork in his hand. Pt's cg feels as if the hospital is \"kicking him out\" and being \"cruel. \" CM has a virtual new PCP appt scheduled for pt. CM attempted to arrange [facility] for wound care for pt however his cg declined it. No further needs identified at this time. Patient is ready to d/c on a CM standpoint. RN aware. 1:59 PM  Pt is d/c to [facility] in [place], [place]. CM set up transport for BLS via [facility]. 11:47 AM  CM contacted pt's cg to get the address in order to send referrals for [facility] for pt's wound care. She reports that she needs to check on the [employer] room the pt was living in before coming to the hospital. She reports she will contact CM back once its confirmed that he will be d/c to that location. 9:28 AM  CM contacted pt to discuss anticipated d/c for today. Pt asked me to discuss things with his cg. CM spoke to [facility] and she had multiple questions about pt's discharge plan and why he would be leaving. SHERRI is attempting to arrange [facility] needs if pt needs wound care however they have not provided an address.  [facility] reports she will contact CM back once she discuss plan with pt.     Tano Sadler MSSHARRI  Care Manager [facility]  [phone]

## 2021-04-29 NOTE — PROGRESS NOTES
Pharmacist Discharge Medication Reconciliation    Significant PMH:   Past Medical History:   Diagnosis Date    Aneurysm ([facility] [address]     (with stroke)    Bladder tumor     Cancer ([facility] [address]     bladder CA    Chronic pain     Family history of bladder cancer     GERD (gastroesophageal reflux disease)     Gout     History of vascular access device 04/25/2019    [facility] VAT 4 FR MIDLINE R Cephalic Limited access    History of vascular access device 04/26/2019    4 fr Midline right Cephalic midline access    Hypercholesterolemia     Hypertension     Lesion of bladder     Seizures ([facility] [address]     Stroke ([facility] [address] 2006    left sided weakness    Thromboembolus ([facility] [address]     left leg    Thyroid disease     TIA (transient ischemic attack) 2012     Chief Complaint for this Admission:   Chief Complaint   Patient presents with    Leg Pain     patient came in by squad with c/o of left leg pain due to a wound that wont heal per patient. patient says hes had the wound for over 2 years. currently wrapped with radha and abd pads. patient was on long term abx therapy zosyn via PICC line. just discontinued     Allergies: Sulfamethoxazole-trimethoprim, Ciprofloxacin, Codeine, Cymbalta [duloxetine], Lyrica [pregabalin], Sulfa (sulfonamide antibiotics), Ultram [tramadol], Vancomycin, and Zosyn [piperacillin-tazobactam]    Discharge Medications:   Current Discharge Medication List        CONTINUE these medications which have NOT CHANGED    Details   colchicine 0.6 mg tablet Take 0.6 mg by mouth daily as needed for Gout or Pain. Indications: acute inflammation of the joints due to gout attack      varenicline (Chantix Starting Month Box) 0.5 mg (11)- 1 mg (42) DsPk Take  by mouth.  Take one tablet by mouth in morning with food for 3 days then increase to 1 tablet by mouth twice daily with food thereafter as directed   Indications: stop smoking      tamsulosin (FLOMAX) 0.4 mg capsule Take 1 capsule by mouth daily after dinner   Qty: 30 Cap, Refills: 12    Associated Diagnoses: Benign localized prostatic hyperplasia with lower urinary tract symptoms (LUTS)      lidocaine (XYLOCAINE) 4 % topical cream Apply  to affected area as needed for Pain (apply to left lower leg wounds during dressing changes). Qty: 5 Tube, Refills: 1      gabapentin (NEURONTIN) 100 mg capsule Take 100 mg by mouth three (3) times daily. levothyroxine (SYNTHROID) 112 mcg tablet Take 112 mcg by mouth Daily (before breakfast). lisinopriL (PRINIVIL, ZESTRIL) 20 mg tablet Take 20 mg by mouth daily. naproxen sodium (Aleve) 220 mg tablet Take 220 mg by mouth three (3) times daily as needed. aspirin 81 mg chewable tablet Take 81 mg by mouth daily. pantoprazole (PROTONIX) 40 mg tablet Take 40 mg by mouth daily as needed. allopurinol (ZYLOPRIM) 100 mg tablet Take 1 Tab by mouth daily. Indications: treatment to prevent acute gout attack  Qty: 30 Tab, Refills: 0      senna-docusate (DARELL-COLACE) 8.6-50 mg per tablet Take 1 Tab by mouth daily as needed. Indications: constipation      methadone (DOLOPHINE) 10 mg/mL solution Take 140 mg by mouth daily. Indications: excessive pain      melatonin 3 mg tablet Take 1 Tab by mouth nightly as needed. Qty: 10 Tab, Refills: 0             The patient's chart, MAR and AVS were reviewed by Mike Blanco [facility].     Discharging Provider: Long Lockett    Thank You,     Mike Blanco, [facility]

## 2021-04-29 NOTE — PROGRESS NOTES
1615- pt requesting wound care be completed before discharge.  His ride is coming to pick him up at 6pm.

## 2021-04-29 NOTE — PROGRESS NOTES
I have reviewed discharge instructions with the patient. The patient verbalized understanding. Iv removed. Medication list gone over and given to the patient prior to discharge. Discharge instructions and belongings with patient. Tele box returned. Wound care done prior to discharge. Cell Phone with patient.

## 2021-04-29 NOTE — PROGRESS NOTES
End of Shift Note    Bedside shift change report given to EMERY Haines (oncoming nurse) by Courtney Clark (offgoing nurse). Report included the following information SBAR, Kardex, ED Summary, Intake/Output, MAR and Recent Results    Shift worked:  9229-1284     Shift summary and any significant changes:     No significant changes     Concerns for physician to address:  Pt would like to discuss about pain in neck. Zone phone for oncoming shift:          Activity:  Activity Level: Up with Assistance  Number times ambulated in hallways past shift: 0  Number of times OOB to chair past shift: 0    Cardiac:   Cardiac Monitoring: Yes      Cardiac Rhythm: Normal sinus rhythm    Access:   Current line(s): PIV     Genitourinary:   Urinary status: voiding    Respiratory:   O2 Device: None (Room air)  Chronic home O2 use?: NO  Incentive spirometer at bedside: NO     GI:  Last Bowel Movement Date: 04/25/21  Current diet:  DIET REGULAR  DIET NUTRITIONAL SUPPLEMENTS Breakfast; Ensure Clear  DIET NUTRITIONAL SUPPLEMENTS Dinner; Ensure Compact  Passing flatus: YES  Tolerating current diet: YES       Pain Management:   Patient states pain is manageable on current regimen: YES    Skin:  Dejuan Score: 14  Interventions: increase time out of bed, foam dressing, internal/external urinary devices and nutritional support     Patient Safety:  Fall Score:  Total Score: 4  Interventions: bed/chair alarm, gripper socks and pt to call before getting OOB  High Fall Risk: Yes    Length of Stay:  Expected LOS: 3d 12h  Actual LOS: [place]

## 2021-05-10 NOTE — DISCHARGE SUMMARY
Hospitalist Discharge Summary     Patient ID:  Tiffany Welch  [id number]  19 y.o.  1965  4/15/2021    PCP on record: None    Admit date: 4/15/2021  Discharge date and time: 4/29/2021  DISCHARGE DIAGNOSIS:    See below    CONSULTATIONS:  IP CONSULT TO ORTHOPEDIC SURGERY  IP CONSULT TO ORTHOPEDIC SURGERY  IP CONSULT TO INFECTIOUS DISEASES    Excerpted HPI from H&P of Kaycee Ludwig MD:  Tiffany Welch is a 63 y.o.  male with extensive past medical history of chronic wound due to pyoderma gangrenosum, GERD, gout, recent traumatic subarachnoid hemorrhage, tonic methadone use, hypertension who presents with left leg pain. Patient has left chronic wound for the last 2 years, has a history of infected leg wound, status post IV Zosyn for 5 weeks. patient reported that he was hospitalized in March 2021 at the [facility] for the infected leg wound, was to complete 6 weeks of IV Zosyn but ended up completing only 5 weeks as he was having rash which was attributed to an allergic reaction to the Zosyn. In addition to the leg pain, he complains of nausea vomiting and fevers. Patient reported purulent drainage and foul-smelling wound on the left leg since he has stopped the antibiotics. Antibiotics reportedly stopped 2 weeks ago. He reported that the wound will was getting better on IV antibiotics, was told that he needs amputation but refused. InThe ED, was found to be febrile with a temperature one 101.4, tachycardic and mildly hypotensive  His labs revealed white blood cell count 25k, his BMP revealed low sodium  X-ray of his left ankle did not show any bone infection    ______________________________________________________________________  DISCHARGE SUMMARY/HOSPITAL COURSE:  for full details see H&P, daily progress notes, labs, consult notes.      Sepsis POA: Temperature 101.4 at rate 108 WBC 25k  Infected extensive chronic  left lower extremity setting of pyoderma gangrenosum  -leukocytosis resolved  current antibiotics including Zyvox, and meropenum  -ID consult appreciated. Patient has been advised by hospitalist and multiple specialists that surgery is the only meaningful treatment options and his LLE requires probably AKA to treat his infection  -patient has had difficulty coming to a decision  Patient is listed allergy to vancomycin  -wound cultures + light enterococcus faecalis, light E coli, light pseudomonas aeruginosa, light alcaligenes faecalis  -biopsy in 2019 showed likely pyoderma gangrenosum  -severe diffuse necrosis of LLE  -appreciate input of specialists: surgery, vascular surgery, ortho surgery, ID, palliative team.      -I further discussed plan of care with the patient today 4/27. He seems to understand the need for surgery. I have told him we will need to come to a decision and that I will follow up in the next 24hrs to see if he has come to a decision     Note that Dr Myrtle Chambers again went over treatment options with patient. Appreciate his time and efforts.      Unfortunately the patient continues to have difficulty arriving at a decision regarding surgery. His caretaker seems fixated on pursuing a biopsy of his wound to eval for pyoderma gangrenosa. Given multiorganism infection, duration of infection, sepsis and risk of infection progression, it's felt that pursuing bx would be futile/unlikely to  of his current issue.  I have discussed that if the patient is not ready for surgery and unable to come to decision regarding surgery in the very near future, it will be necessary to discharge him from the hospital.     History of traumatic subarachnoid hemorrhage in December 2020  Avoid NSAIDs and blood thinners  Continue SCDs for DVT prophylaxis     History of gout and osteoarthritis of both knees   -Cont' cochicine.       Chronic methadone use: Continue home dose methadone   -appreciate palliative input     Hypothyroidism  -continue Synthroid     HTN  -resume home lisinopril     Left sided weakness w/ contracture  -chronic/stable     _______________________________________________________________________  Patient seen and examined by me on discharge day. Pertinent Findings:  Gen:    Not in distress  Chest: Clear lungs  CVS:   Regular rhythm. No edema  Abd:  Soft, not distended, not tender  Neuro:  Alert, oriented x 3  _______________________________________________________________________  DISCHARGE MEDICATIONS:   Discharge Medication List as of 4/29/2021  3:21 PM      CONTINUE these medications which have NOT CHANGED    Details   colchicine 0.6 mg tablet Take 0.6 mg by mouth daily as needed for Gout or Pain. Indications: acute inflammation of the joints due to gout attack, Historical Med      varenicline (Chantix Starting Month Box) 0.5 mg (11)- 1 mg (42) DsPk Take  by mouth. Take one tablet by mouth in morning with food for 3 days then increase to 1 tablet by mouth twice daily with food thereafter as directed   Indications: stop smoking, Historical Med      tamsulosin (FLOMAX) 0.4 mg capsule Take 1 capsule by mouth daily after dinner , Normal, Disp-30 Cap, R-12      lidocaine (XYLOCAINE) 4 % topical cream Apply  to affected area as needed for Pain (apply to left lower leg wounds during dressing changes). , Normal, Disp-5 Tube, R-1      gabapentin (NEURONTIN) 100 mg capsule Take 100 mg by mouth three (3) times daily. , Historical Med      levothyroxine (SYNTHROID) 112 mcg tablet Take 112 mcg by mouth Daily (before breakfast). , Historical Med      lisinopriL (PRINIVIL, ZESTRIL) 20 mg tablet Take 20 mg by mouth daily. , Historical Med      naproxen sodium (Aleve) 220 mg tablet Take 220 mg by mouth three (3) times daily as needed., Historical Med      aspirin 81 mg chewable tablet Take 81 mg by mouth daily. , Historical Med      pantoprazole (PROTONIX) 40 mg tablet Take 40 mg by mouth daily as needed., Historical Med      allopurinol (ZYLOPRIM) 100 mg tablet Take 1 Tab by mouth daily. Indications: treatment to prevent acute gout attack, Print, Disp-30 Tab, R-0      senna-docusate (DARELL-COLACE) 8.6-50 mg per tablet Take 1 Tab by mouth daily as needed. Indications: constipation, Historical Med      methadone (DOLOPHINE) 10 mg/mL solution Take 140 mg by mouth daily. Indications: excessive pain, Historical Med      melatonin 3 mg tablet Take 1 Tab by mouth nightly as needed., No Print, Disp-10 Tab, R-0               Patient Follow Up Instructions: Activity: Activity as tolerated  Diet: Resume previous diet  Wound Care: attempting to arrange home health wound care.  Patient and wife have not given case management address for home health to be arranged so far    Follow-up as below    Follow-up Information     Follow up With Specialties Details Why Contact Isabella Gillis MD Internal Medicine On 5/5/2021 For Virtual new patient appointment at 9:50AM  [address]  [phone]          ________________________________________________________________    Risk of deterioration: Moderate    Condition at Discharge:  Stable  __________________________________________________________________    Disposition  Home with family and home health services    ____________________________________________________________________    Code Status: Full Code  ___________________________________________________________________      Total time in minutes spent coordinating this discharge (includes going over instructions, follow-up, prescriptions, and preparing report for sign off to her PCP) :  >30 minutes    Signed:  Edith Harper DO

## 2021-05-17 LAB
BACTERIA SPEC CULT: NORMAL
SERVICE CMNT-IMP: NORMAL

## 2021-06-25 ENCOUNTER — HOSPITAL ENCOUNTER (EMERGENCY)
Dept: GENERAL RADIOLOGY | Age: 56
Discharge: HOME | DRG: 720 | End: 2021-06-25
Attending: EMERGENCY MEDICINE
Payer: MEDICAID

## 2021-06-25 ENCOUNTER — APPOINTMENT (OUTPATIENT)
Dept: ULTRASOUND IMAGING | Age: 56
DRG: 720 | End: 2021-06-25
Attending: EMERGENCY MEDICINE
Payer: MEDICAID

## 2021-06-25 ENCOUNTER — HOSPITAL ENCOUNTER (INPATIENT)
Age: 56
LOS: 20 days | Discharge: HOME | DRG: 720 | End: 2021-07-15
Attending: STUDENT IN AN ORGANIZED HEALTH CARE EDUCATION/TRAINING PROGRAM | Admitting: HOSPITALIST
Payer: MEDICAID

## 2021-06-25 DIAGNOSIS — L08.9 LEFT FOOT INFECTION: ICD-10-CM

## 2021-06-25 DIAGNOSIS — Z79.891 LONG TERM CURRENT USE OF METHADONE FOR PAIN CONTROL: ICD-10-CM

## 2021-06-25 DIAGNOSIS — A41.9 SEPSIS WITH ACUTE ORGAN DYSFUNCTION WITHOUT SEPTIC SHOCK, DUE TO UNSPECIFIED ORGANISM, UNSPECIFIED TYPE (HCC): Primary | ICD-10-CM

## 2021-06-25 DIAGNOSIS — R53.81 PHYSICAL DEBILITY: ICD-10-CM

## 2021-06-25 DIAGNOSIS — R65.20 SEPSIS WITH ACUTE ORGAN DYSFUNCTION WITHOUT SEPTIC SHOCK, DUE TO UNSPECIFIED ORGANISM, UNSPECIFIED TYPE (HCC): Primary | ICD-10-CM

## 2021-06-25 DIAGNOSIS — L88 PYODERMIC GANGRENOSUM: ICD-10-CM

## 2021-06-25 DIAGNOSIS — L03.116 LEFT LEG CELLULITIS: ICD-10-CM

## 2021-06-25 DIAGNOSIS — L97.529 ULCER OF LEFT FOOT, UNSPECIFIED ULCER STAGE (HCC): ICD-10-CM

## 2021-06-25 DIAGNOSIS — G89.0 CENTRAL PAIN SYNDROME: ICD-10-CM

## 2021-06-25 DIAGNOSIS — L03.116 CELLULITIS OF LEFT LOWER EXTREMITY: ICD-10-CM

## 2021-06-25 DIAGNOSIS — G89.4 CHRONIC PAIN DISORDER: ICD-10-CM

## 2021-06-25 LAB
ALBUMIN SERPL-MCNC: 2.9 G/DL (ref 3.5–5)
ALBUMIN/GLOB SERPL: 0.7 {RATIO} (ref 1.1–2.2)
ALP SERPL-CCNC: 165 U/L (ref 45–117)
ALT SERPL-CCNC: 14 U/L (ref 12–78)
ANION GAP SERPL CALC-SCNC: 6 MMOL/L (ref 5–15)
AST SERPL-CCNC: 8 U/L (ref 15–37)
ATRIAL RATE: 95 BPM
BASOPHILS # BLD: 0 K/UL (ref 0–0.1)
BASOPHILS NFR BLD: 0 % (ref 0–1)
BILIRUB SERPL-MCNC: 0.5 MG/DL (ref 0.2–1)
BUN SERPL-MCNC: 12 MG/DL (ref 6–20)
BUN/CREAT SERPL: 13 (ref 12–20)
CALCIUM SERPL-MCNC: 8.9 MG/DL (ref 8.5–10.1)
CALCULATED P AXIS, ECG09: 42 DEGREES
CALCULATED R AXIS, ECG10: 20 DEGREES
CALCULATED T AXIS, ECG11: 10 DEGREES
CHLORIDE SERPL-SCNC: 95 MMOL/L (ref 97–108)
CO2 SERPL-SCNC: 28 MMOL/L (ref 21–32)
CREAT SERPL-MCNC: 0.95 MG/DL (ref 0.7–1.3)
DIAGNOSIS, 93000: NORMAL
DIFFERENTIAL METHOD BLD: ABNORMAL
EOSINOPHIL # BLD: 0.2 K/UL (ref 0–0.4)
EOSINOPHIL NFR BLD: 1 % (ref 0–7)
ERYTHROCYTE [DISTWIDTH] IN BLOOD BY AUTOMATED COUNT: 15.6 % (ref 11.5–14.5)
GLOBULIN SER CALC-MCNC: 4 G/DL (ref 2–4)
GLUCOSE SERPL-MCNC: 113 MG/DL (ref 65–100)
HCT VFR BLD AUTO: 39.6 % (ref 36.6–50.3)
HGB BLD-MCNC: 12.9 G/DL (ref 12.1–17)
IMM GRANULOCYTES # BLD AUTO: 0 K/UL (ref 0–0.04)
IMM GRANULOCYTES NFR BLD AUTO: 0 % (ref 0–0.5)
LACTATE SERPL-SCNC: 1.8 MMOL/L (ref 0.4–2)
LACTATE SERPL-SCNC: 2.4 MMOL/L (ref 0.4–2)
LYMPHOCYTES # BLD: 2.8 K/UL (ref 0.8–3.5)
LYMPHOCYTES NFR BLD: 13 % (ref 12–49)
MCH RBC QN AUTO: 28.7 PG (ref 26–34)
MCHC RBC AUTO-ENTMCNC: 32.6 G/DL (ref 30–36.5)
MCV RBC AUTO: 88 FL (ref 80–99)
METAMYELOCYTES NFR BLD MANUAL: 1 %
MONOCYTES # BLD: 2.8 K/UL (ref 0–1)
MONOCYTES NFR BLD: 13 % (ref 5–13)
NEUTS BAND NFR BLD MANUAL: 1 %
NEUTS SEG # BLD: 15.6 K/UL (ref 1.8–8)
NEUTS SEG NFR BLD: 71 % (ref 32–75)
NRBC # BLD: 0 K/UL (ref 0–0.01)
NRBC BLD-RTO: 0 PER 100 WBC
P-R INTERVAL, ECG05: 156 MS
PLATELET # BLD AUTO: 603 K/UL (ref 150–400)
PMV BLD AUTO: 8.9 FL (ref 8.9–12.9)
POTASSIUM SERPL-SCNC: 4.3 MMOL/L (ref 3.5–5.1)
PROT SERPL-MCNC: 6.9 G/DL (ref 6.4–8.2)
Q-T INTERVAL, ECG07: 396 MS
QRS DURATION, ECG06: 88 MS
QTC CALCULATION (BEZET), ECG08: 497 MS
RBC # BLD AUTO: 4.5 M/UL (ref 4.1–5.7)
RBC MORPH BLD: ABNORMAL
SODIUM SERPL-SCNC: 129 MMOL/L (ref 136–145)
VENTRICULAR RATE, ECG03: 95 BPM
WBC # BLD AUTO: 21.7 K/UL (ref 4.1–11.1)
WBC MORPH BLD: ABNORMAL

## 2021-06-25 PROCEDURE — 73630 X-RAY EXAM OF FOOT: CPT

## 2021-06-25 PROCEDURE — 93005 ELECTROCARDIOGRAM TRACING: CPT

## 2021-06-25 PROCEDURE — 74011250636 HC RX REV CODE- 250/636: Performed by: EMERGENCY MEDICINE

## 2021-06-25 PROCEDURE — 93971 EXTREMITY STUDY: CPT

## 2021-06-25 PROCEDURE — 87040 BLOOD CULTURE FOR BACTERIA: CPT

## 2021-06-25 PROCEDURE — 36415 COLL VENOUS BLD VENIPUNCTURE: CPT

## 2021-06-25 PROCEDURE — 85025 COMPLETE CBC W/AUTO DIFF WBC: CPT

## 2021-06-25 PROCEDURE — 74011250636 HC RX REV CODE- 250/636: Performed by: HOSPITALIST

## 2021-06-25 PROCEDURE — 74011250637 HC RX REV CODE- 250/637: Performed by: HOSPITALIST

## 2021-06-25 PROCEDURE — 80053 COMPREHEN METABOLIC PANEL: CPT

## 2021-06-25 PROCEDURE — 96375 TX/PRO/DX INJ NEW DRUG ADDON: CPT

## 2021-06-25 PROCEDURE — 99284 EMERGENCY DEPT VISIT MOD MDM: CPT

## 2021-06-25 PROCEDURE — 74011000258 HC RX REV CODE- 258: Performed by: EMERGENCY MEDICINE

## 2021-06-25 PROCEDURE — 96365 THER/PROPH/DIAG IV INF INIT: CPT

## 2021-06-25 PROCEDURE — 65660000001 HC RM ICU INTERMED STEPDOWN

## 2021-06-25 PROCEDURE — 83605 ASSAY OF LACTIC ACID: CPT

## 2021-06-25 PROCEDURE — 73590 X-RAY EXAM OF LOWER LEG: CPT

## 2021-06-25 RX ORDER — MORPHINE SULFATE 2 MG/ML
4 INJECTION, SOLUTION INTRAMUSCULAR; INTRAVENOUS ONCE
Status: COMPLETED | OUTPATIENT
Start: 2021-06-25 | End: 2021-06-25

## 2021-06-25 RX ORDER — LEVOTHYROXINE SODIUM 125 UG/1
125 TABLET ORAL
Status: ON HOLD | COMMUNITY

## 2021-06-25 RX ORDER — SODIUM CHLORIDE 0.9 % (FLUSH) 0.9 %
5-40 SYRINGE (ML) INJECTION EVERY 8 HOURS
Status: DISCONTINUED | OUTPATIENT
Start: 2021-06-25 | End: 2021-07-15 | Stop reason: HOSPADM

## 2021-06-25 RX ORDER — LEVOTHYROXINE SODIUM 125 UG/1
125 TABLET ORAL
Status: DISCONTINUED | OUTPATIENT
Start: 2021-06-26 | End: 2021-07-15 | Stop reason: HOSPADM

## 2021-06-25 RX ORDER — LISINOPRIL 20 MG/1
20 TABLET ORAL DAILY
Status: DISCONTINUED | OUTPATIENT
Start: 2021-06-26 | End: 2021-07-04

## 2021-06-25 RX ORDER — HYDROMORPHONE HYDROCHLORIDE 2 MG/ML
1 INJECTION, SOLUTION INTRAMUSCULAR; INTRAVENOUS; SUBCUTANEOUS
Status: DISCONTINUED | OUTPATIENT
Start: 2021-06-25 | End: 2021-07-01

## 2021-06-25 RX ORDER — METHADONE HYDROCHLORIDE 10 MG/ML
140 CONCENTRATE ORAL DAILY
Status: DISCONTINUED | OUTPATIENT
Start: 2021-06-26 | End: 2021-07-15 | Stop reason: HOSPADM

## 2021-06-25 RX ORDER — GABAPENTIN 100 MG/1
100 CAPSULE ORAL 3 TIMES DAILY
Status: DISCONTINUED | OUTPATIENT
Start: 2021-06-25 | End: 2021-07-03

## 2021-06-25 RX ORDER — ENOXAPARIN SODIUM 100 MG/ML
40 INJECTION SUBCUTANEOUS DAILY
Status: DISCONTINUED | OUTPATIENT
Start: 2021-06-26 | End: 2021-07-15 | Stop reason: HOSPADM

## 2021-06-25 RX ORDER — GUAIFENESIN 100 MG/5ML
81 LIQUID (ML) ORAL DAILY
Status: DISCONTINUED | OUTPATIENT
Start: 2021-06-26 | End: 2021-07-15 | Stop reason: HOSPADM

## 2021-06-25 RX ORDER — SODIUM CHLORIDE 0.9 % (FLUSH) 0.9 %
5-10 SYRINGE (ML) INJECTION AS NEEDED
Status: DISCONTINUED | OUTPATIENT
Start: 2021-06-25 | End: 2021-07-11 | Stop reason: SDUPTHER

## 2021-06-25 RX ORDER — FAMOTIDINE 20 MG/1
20 TABLET, FILM COATED ORAL 2 TIMES DAILY
Status: DISCONTINUED | OUTPATIENT
Start: 2021-06-25 | End: 2021-07-15 | Stop reason: HOSPADM

## 2021-06-25 RX ORDER — LANOLIN ALCOHOL/MO/W.PET/CERES
3 CREAM (GRAM) TOPICAL
Status: DISCONTINUED | OUTPATIENT
Start: 2021-06-25 | End: 2021-07-15 | Stop reason: HOSPADM

## 2021-06-25 RX ORDER — ACETAMINOPHEN 650 MG/1
650 SUPPOSITORY RECTAL
Status: DISCONTINUED | OUTPATIENT
Start: 2021-06-25 | End: 2021-07-15 | Stop reason: HOSPADM

## 2021-06-25 RX ORDER — LINEZOLID 2 MG/ML
600 INJECTION, SOLUTION INTRAVENOUS EVERY 12 HOURS
Status: COMPLETED | OUTPATIENT
Start: 2021-06-25 | End: 2021-07-05

## 2021-06-25 RX ORDER — ONDANSETRON 4 MG/1
4 TABLET, ORALLY DISINTEGRATING ORAL
Status: DISCONTINUED | OUTPATIENT
Start: 2021-06-25 | End: 2021-07-15 | Stop reason: HOSPADM

## 2021-06-25 RX ORDER — ONDANSETRON 2 MG/ML
4 INJECTION INTRAMUSCULAR; INTRAVENOUS
Status: DISCONTINUED | OUTPATIENT
Start: 2021-06-25 | End: 2021-06-25

## 2021-06-25 RX ORDER — AMOXICILLIN 250 MG
1 CAPSULE ORAL DAILY
Status: DISCONTINUED | OUTPATIENT
Start: 2021-06-26 | End: 2021-07-05

## 2021-06-25 RX ORDER — SODIUM CHLORIDE 9 MG/ML
100 INJECTION, SOLUTION INTRAVENOUS CONTINUOUS
Status: DISCONTINUED | OUTPATIENT
Start: 2021-06-25 | End: 2021-06-27

## 2021-06-25 RX ORDER — ACETAMINOPHEN 325 MG/1
650 TABLET ORAL
Status: DISCONTINUED | OUTPATIENT
Start: 2021-06-25 | End: 2021-06-25

## 2021-06-25 RX ORDER — ACETAMINOPHEN 325 MG/1
650 TABLET ORAL
Status: DISCONTINUED | OUTPATIENT
Start: 2021-06-25 | End: 2021-07-15 | Stop reason: HOSPADM

## 2021-06-25 RX ORDER — ONDANSETRON 2 MG/ML
4 INJECTION INTRAMUSCULAR; INTRAVENOUS
Status: DISCONTINUED | OUTPATIENT
Start: 2021-06-25 | End: 2021-07-15 | Stop reason: HOSPADM

## 2021-06-25 RX ORDER — SODIUM CHLORIDE 0.9 % (FLUSH) 0.9 %
5-40 SYRINGE (ML) INJECTION AS NEEDED
Status: DISCONTINUED | OUTPATIENT
Start: 2021-06-25 | End: 2021-07-15 | Stop reason: HOSPADM

## 2021-06-25 RX ORDER — OXYCODONE HYDROCHLORIDE 5 MG/1
5 TABLET ORAL
Status: DISCONTINUED | OUTPATIENT
Start: 2021-06-25 | End: 2021-06-26

## 2021-06-25 RX ORDER — MYCOPHENOLATE MOFETIL 500 MG/1
500 TABLET ORAL 2 TIMES DAILY
COMMUNITY
Start: 2021-06-07 | End: 2022-10-16

## 2021-06-25 RX ORDER — TAMSULOSIN HYDROCHLORIDE 0.4 MG/1
0.4 CAPSULE ORAL DAILY
Status: DISCONTINUED | OUTPATIENT
Start: 2021-06-26 | End: 2021-07-15 | Stop reason: HOSPADM

## 2021-06-25 RX ORDER — POLYETHYLENE GLYCOL 3350 17 G/17G
17 POWDER, FOR SOLUTION ORAL DAILY PRN
Status: DISCONTINUED | OUTPATIENT
Start: 2021-06-25 | End: 2021-07-15 | Stop reason: HOSPADM

## 2021-06-25 RX ORDER — NALOXONE HYDROCHLORIDE 0.4 MG/ML
0.4 INJECTION, SOLUTION INTRAMUSCULAR; INTRAVENOUS; SUBCUTANEOUS AS NEEDED
Status: DISCONTINUED | OUTPATIENT
Start: 2021-06-25 | End: 2021-07-15 | Stop reason: HOSPADM

## 2021-06-25 RX ORDER — IBUPROFEN 200 MG
400 TABLET ORAL
Status: ON HOLD | COMMUNITY

## 2021-06-25 RX ORDER — ALLOPURINOL 100 MG/1
100 TABLET ORAL DAILY
Status: DISCONTINUED | OUTPATIENT
Start: 2021-06-26 | End: 2021-07-01

## 2021-06-25 RX ADMIN — GABAPENTIN 100 MG: 100 CAPSULE ORAL at 17:10

## 2021-06-25 RX ADMIN — GABAPENTIN 100 MG: 100 CAPSULE ORAL at 21:46

## 2021-06-25 RX ADMIN — OXYCODONE HYDROCHLORIDE 5 MG: 5 TABLET ORAL at 23:22

## 2021-06-25 RX ADMIN — LINEZOLID 600 MG: 600 INJECTION, SOLUTION INTRAVENOUS at 15:56

## 2021-06-25 RX ADMIN — SODIUM CHLORIDE 130 ML/HR: 9 INJECTION, SOLUTION INTRAVENOUS at 17:54

## 2021-06-25 RX ADMIN — LINEZOLID 600 MG: 600 INJECTION, SOLUTION INTRAVENOUS at 23:20

## 2021-06-25 RX ADMIN — MEROPENEM 500 MG: 500 INJECTION, POWDER, FOR SOLUTION INTRAVENOUS at 14:13

## 2021-06-25 RX ADMIN — FAMOTIDINE 20 MG: 20 TABLET ORAL at 17:54

## 2021-06-25 RX ADMIN — SODIUM CHLORIDE 10 ML: 9 INJECTION, SOLUTION INTRAMUSCULAR; INTRAVENOUS; SUBCUTANEOUS at 17:55

## 2021-06-25 RX ADMIN — SODIUM CHLORIDE 1000 ML: 9 INJECTION, SOLUTION INTRAVENOUS at 17:54

## 2021-06-25 RX ADMIN — SODIUM CHLORIDE 10 ML: 9 INJECTION, SOLUTION INTRAMUSCULAR; INTRAVENOUS; SUBCUTANEOUS at 23:26

## 2021-06-25 RX ADMIN — ONDANSETRON 4 MG: 4 TABLET, ORALLY DISINTEGRATING ORAL at 17:53

## 2021-06-25 RX ADMIN — SODIUM CHLORIDE 1000 ML: 9 INJECTION, SOLUTION INTRAVENOUS at 14:12

## 2021-06-25 RX ADMIN — HYDROMORPHONE HYDROCHLORIDE 1 MG: 2 INJECTION, SOLUTION INTRAMUSCULAR; INTRAVENOUS; SUBCUTANEOUS at 20:16

## 2021-06-25 RX ADMIN — MEROPENEM 500 MG: 500 INJECTION, POWDER, FOR SOLUTION INTRAVENOUS at 17:54

## 2021-06-25 RX ADMIN — OXYCODONE HYDROCHLORIDE 5 MG: 5 TABLET ORAL at 17:10

## 2021-06-25 RX ADMIN — MORPHINE SULFATE 4 MG: 2 INJECTION, SOLUTION INTRAMUSCULAR; INTRAVENOUS at 14:12

## 2021-06-25 NOTE — CONSULTS
Pt well known to DR Kayley Lee  Many past visits and documented discussions   Regarding need for amputation    Patient admitted gain with life threatening sepsis and   Foul smelling necrotic leg wound    Asked again to see    Patient and his care giver know the extent and  Treatment options available from ortho  At this point he is still telling the admitting team he is not  Ready to consent for amputation    Recommend psychiatry for help and   Support  Discussed wit DR Kayley Lee and will be available to   Treat with amputation when patient is ready   To consent.

## 2021-06-25 NOTE — ED PROVIDER NOTES
EMERGENCY DEPARTMENT HISTORY AND PHYSICAL EXAM      Date: 6/25/2021  Patient Name: Alberta Chaves    History of Presenting Illness     Chief Complaint   Patient presents with    Leg Pain     Pt had hx of PG, wound dressings noted with drainage. History Provided By: Patient    HPI: Alberta Chaves, 63 y.o. male  presents to the ED with cc of left lower extremity pain. 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 These have been treated in the hospital with IV antibiotics on multiple occasions. Patient has had recommendations me that he needs an amputation of the left lower extremity otherwise these wounds will never heal and he will continue to have infections. The patient has refused these in the past. He presents with worsening infection now in the leg. He has drainage from the wounds. He is not on any oral antibiotics. Pain is worse with any movement of the foot. Denies any fevers or chills. He does show me a bottle of CellCept that he states that his dermatologist said it would help \"heal the wounds from the inside out\".     Past History     Past Medical History:  Past Medical History:   Diagnosis Date    Aneurysm ([address]     (with stroke)    Bladder tumor     Cancer ([address]     bladder CA    Chronic pain     Family history of bladder cancer     GERD (gastroesophageal reflux disease)     Gout     History of vascular access device 04/25/2019    [facility] VAT 4 FR MIDLINE R Cephalic Limited access    History of vascular access device 04/26/2019    4 fr Midline right Cephalic midline access    Hypercholesterolemia     Hypertension     Lesion of bladder     Seizures ([address]     Stroke ([address] 2006    left sided weakness    Thromboembolus ([address]     left leg    Thyroid disease     TIA (transient ischemic attack) 2012       Past Surgical History:  Past Surgical History:   Procedure Laterality Date    HX ORTHOPAEDIC      R arthroscopy    HX OTHER SURGICAL      x2 L foot    HX UROLOGICAL  04/20/2018    Cystoscopy, TURBT (greater than 5 cm resected) Dr. Timmy Taylor, [facility].  KNEE ARTHROSCP HARV      left knee    TRANSURETHRAL RESEC BLADDER NECK  08/10/2018       Medications:  No current facility-administered medications on file prior to encounter. Current Outpatient Medications on File Prior to Encounter   Medication Sig Dispense Refill    levothyroxine (SYNTHROID) 125 mcg tablet Take 125 mcg by mouth Daily (before breakfast).  mycophenolate (CELLCEPT) 500 mg tablet Take 500 mg by mouth two (2) times a day.  ibuprofen (MOTRIN) 200 mg tablet Take 400 mg by mouth every six (6) hours as needed for Pain.  colchicine 0.6 mg tablet Take 0.6 mg by mouth daily as needed for Gout or Pain. Indications: acute inflammation of the joints due to gout attack      tamsulosin (FLOMAX) 0.4 mg capsule Take 1 capsule by mouth daily after dinner  30 Cap 12    lidocaine (XYLOCAINE) 4 % topical cream Apply  to affected area as needed for Pain (apply to left lower leg wounds during dressing changes). 5 Tube 1    gabapentin (NEURONTIN) 100 mg capsule Take 100 mg by mouth three (3) times daily.  lisinopriL (PRINIVIL, ZESTRIL) 20 mg tablet Take 20 mg by mouth daily.  aspirin 81 mg chewable tablet Take 81 mg by mouth daily.  pantoprazole (PROTONIX) 40 mg tablet Take 40 mg by mouth daily as needed.  allopurinol (ZYLOPRIM) 100 mg tablet Take 1 Tab by mouth daily. Indications: treatment to prevent acute gout attack 30 Tab 0    senna-docusate (DARELL-COLACE) 8.6-50 mg per tablet Take 1 Tablet by mouth daily. Indications: constipation      methadone (DOLOPHINE) 10 mg/mL solution Take 140 mg by mouth daily. Indications: excessive pain      melatonin 3 mg tablet Take 1 Tab by mouth nightly as needed. 10 Tab 0    varenicline (Chantix Starting Month Box) 0.5 mg (11)- 1 mg (42) DsPk Take  by mouth.  Take one tablet by mouth in morning with food for 3 days then increase to 1 tablet by mouth twice daily with food thereafter as directed   Indications: stop smoking      [DISCONTINUED] levothyroxine (SYNTHROID) 112 mcg tablet Take 112 mcg by mouth Daily (before breakfast).  [DISCONTINUED] naproxen sodium (Aleve) 220 mg tablet Take 220 mg by mouth three (3) times daily as needed. Family History:  Family History   Problem Relation Age of Onset    Diabetes Father     Lung Disease Father     Diabetes Sister     Diabetes Brother     Cancer Paternal Grandfather         Bladder       Social History:  Social History     Tobacco Use    Smoking status: Current Every Day Smoker     Packs/day: 0.50    Smokeless tobacco: Never Used    Tobacco comment: instructed not to smoke 24 hrs prior surgery   Substance Use Topics    Alcohol use: Yes     Alcohol/week: 6.0 standard drinks     Types: 6 Cans of beer per week     Comment: occasional    Drug use: Yes     Types: Prescription       Allergies: Allergies   Allergen Reactions    Sulfamethoxazole-Trimethoprim Rash     L eye and L hand    Ciprofloxacin Hives     Per pcp records    Codeine Hives     Tolerates dilaudid, oxycodone    Cymbalta [Duloxetine] Other (comments)     Confusion and memory loss    Lyrica [Pregabalin] Hives    Sulfa (Sulfonamide Antibiotics) Rash    Ultram [Tramadol] Hives    Vancomycin Shortness of Breath    Zosyn [Piperacillin-Tazobactam] Rash       All the above components of the past  history are auto-populated from the electronic record. They have been reviewed and the patient has been interviewed for any pertinent past history that pertains to the patient's chief complaint and reason for visit. Not all pre-populated components may be accurate at the time this note was generated. Review of Systems   Review of Systems   Constitutional: Negative for chills and fever. HENT: Negative for congestion, ear pain, rhinorrhea, sore throat and trouble swallowing.     Eyes: Negative for visual disturbance. Respiratory: Negative for cough, chest tightness and shortness of breath. Cardiovascular: Positive for leg swelling. Negative for chest pain and palpitations. Gastrointestinal: Negative for abdominal pain, blood in stool, constipation, diarrhea, nausea and vomiting. Genitourinary: Negative for decreased urine volume, difficulty urinating, dysuria and frequency. Musculoskeletal: Negative for back pain and neck pain. Skin: Positive for wound. Negative for color change and rash. Neurological: Negative for dizziness, weakness, light-headedness and headaches. Physical Exam   Physical Exam  Vitals and nursing note reviewed. Constitutional:       General: He is not in acute distress. Appearance: He is well-developed. He is ill-appearing. HENT:      Head: Normocephalic. Eyes:      Conjunctiva/sclera: Conjunctivae normal.   Cardiovascular:      Rate and Rhythm: Regular rhythm. Tachycardia present. Pulmonary:      Effort: Pulmonary effort is normal. No accessory muscle usage or respiratory distress. Abdominal:      General: There is no distension. Musculoskeletal:      Cervical back: Normal range of motion. Legs:    Skin:     General: Skin is warm and dry. Neurological:      Mental Status: He is alert and oriented to person, place, and time. Due to the COVID-19 pandemic, in order to reduce the spread and transmission of the virus, some basic elements of the physical exam have been deferred to reduce direct or close contact with the patient unless they are deemed to be absolutely necessary, regardless of whether the virus is highly suspected or not. Diagnostic Study Results     Labs -     Recent Results (from the past 24 hour(s))   CBC WITH AUTOMATED DIFF    Collection Time: 06/25/21 12:04 PM   Result Value Ref Range    WBC 21.7 (H) 4.1 - 11.1 K/uL    RBC 4.50 4. 10 - 5.70 M/uL    HGB 12.9 12.1 - 17.0 g/dL    HCT 39.6 36.6 - 50.3 %    MCV 88.0 80.0 - 99.0 FL    MCH 28.7 26.0 - 34.0 PG    MCHC 32.6 30.0 - 36.5 g/dL    RDW 15.6 (H) 11.5 - 14.5 %    PLATELET 896 (H) 980 - 400 K/uL    MPV 8.9 8.9 - 12.9 FL    NRBC 0.0 0  WBC    ABSOLUTE NRBC 0.00 0.00 - 0.01 K/uL    NEUTROPHILS 71 32 - 75 %    BAND NEUTROPHILS 1 %    LYMPHOCYTES 13 12 - 49 %    MONOCYTES 13 5 - 13 %    EOSINOPHILS 1 0 - 7 %    BASOPHILS 0 0 - 1 %    METAMYELOCYTES 1 %    IMMATURE GRANULOCYTES 0 0.0 - 0.5 %    ABS. NEUTROPHILS 15.6 (H) 1.8 - 8.0 K/UL    ABS. LYMPHOCYTES 2.8 0.8 - 3.5 K/UL    ABS. MONOCYTES 2.8 (H) 0.0 - 1.0 K/UL    ABS. EOSINOPHILS 0.2 0.0 - 0.4 K/UL    ABS. BASOPHILS 0.0 0.0 - 0.1 K/UL    ABS. IMM. GRANS. 0.0 0.00 - 0.04 K/UL    DF MANUAL      RBC COMMENTS ANISOCYTOSIS  1+        WBC COMMENTS VACUOLATED POLYS     METABOLIC PANEL, COMPREHENSIVE    Collection Time: 06/25/21 12:04 PM   Result Value Ref Range    Sodium 129 (L) 136 - 145 mmol/L    Potassium 4.3 3.5 - 5.1 mmol/L    Chloride 95 (L) 97 - 108 mmol/L    CO2 28 21 - 32 mmol/L    Anion gap 6 5 - 15 mmol/L    Glucose 113 (H) 65 - 100 mg/dL    BUN 12 6 - 20 MG/DL    Creatinine 0.95 0.70 - 1.30 MG/DL    BUN/Creatinine ratio 13 12 - 20      GFR est AA >60 >60 ml/min/1.73m2    GFR est non-AA >60 >60 ml/min/1.73m2    Calcium 8.9 8.5 - 10.1 MG/DL    Bilirubin, total 0.5 0.2 - 1.0 MG/DL    ALT (SGPT) 14 12 - 78 U/L    AST (SGOT) 8 (L) 15 - 37 U/L    Alk.  phosphatase 165 (H) 45 - 117 U/L    Protein, total 6.9 6.4 - 8.2 g/dL    Albumin 2.9 (L) 3.5 - 5.0 g/dL    Globulin 4.0 2.0 - 4.0 g/dL    A-G Ratio 0.7 (L) 1.1 - 2.2     LACTIC ACID    Collection Time: 06/25/21 12:04 PM   Result Value Ref Range    Lactic acid 2.4 (HH) 0.4 - 2.0 MMOL/L   EKG, 12 LEAD, INITIAL    Collection Time: 06/25/21  2:21 PM   Result Value Ref Range    Ventricular Rate 95 BPM    Atrial Rate 95 BPM    P-R Interval 156 ms    QRS Duration 88 ms    Q-T Interval 396 ms    QTC Calculation (Bezet) 497 ms    Calculated P Axis 42 degrees    Calculated R Axis 20 degrees    Calculated T Axis 10 degrees    Diagnosis       Normal sinus rhythm  Prolonged QT  When compared with ECG of 15-APR-2021 07:35,  Non-specific change in ST segment in Inferior leads         Radiologic Studies -   XR TIB/FIB LT   Final Result   Severe diffuse osteopenia and soft tissue contour irregularity of   distal leg without destructive bone process demonstrated. XR FOOT LT MIN 3 V   Final Result   Severe diffuse osteopenia. Diffuse soft tissue swelling and skin   contour irregularity. No destructive bone process demonstrated. CT Results  (Last 48 hours)    None        CXR Results  (Last 48 hours)    None            Medical Decision Making     I reviewed the vital signs, available nursing notes, past medical history, past surgical history, family history and social history. Vital Signs-I have reviewed the vital signs that have been made available during the patient's emergency department visit. The vital signs auto-populated below are obtained mostly by electronic means through monitoring devices that have been downloaded into the patient's chart by the nursing staff. Some vital signs are not downloaded into the chart until after the patient has been discharged and this note has been completed, therefore some vital signs may not be available to the physician for review prior to patient's discharge or admission. The physician has reviewed the patient's triage vital signs, monitored the electronic monitoring devices remotely for any significant vital sign abnormalities, and have reviewed vital signs prior to discharge. Some vital signs reviewed at bedside or remotely utilizing electronic monitoring devices may be different than the vital signs downloaded into the electronic medical record.   Some vital signs may be erroneous and inaccurate since they are obtained by electronic monitoring devices, and not all vital signs are verified for accuracy by nursing staff prior to downloading into the patient's chart. Patient Vitals for the past 24 hrs:   Temp Pulse Resp BP SpO2   06/25/21 1056 98 °F (36.7 °C) (!) 134 20 (!) 129/97 97 %         Records Reviewed: Nursing notes for today's visit have been reviewed. I have also reviewed most recent medical records pertinent to today's complaints, if available in our medical record system. I have also reviewed all labs and imaging results from previous results in comparison to results obtained today. If an EKG was obtained today, it has been compared to previous EKGs, if available. If arriving via EMS, the EMS report has been reviewed if made available to us within the patient's time in the emergency department. Provider Notes (Medical Decision Making):   Patient with a history of pyodermic gangrenosum presents with a chronic cellulitis and wounds to the left lower extremity. He has been told multiple times in the past he needs amputation but he continually refuses. He has not been going to wound care he has not been taking care of it. He comes to the ER today with worsening infection in the leg. He has open wounds down to the bone and tendon structures. No signs of necrotizing fasciitis on the x-rays. He does have severe sepsis with an elevated white blood cell count, tachycardia, and elevated lactic acid. He is not in shock. Review of the medical records note that he was getting meropenem and Zyvox per ID recommendations back in April so I will restart these on him this hospitalization. I did  him again that probably the only thing that is going to help him is amputation of the extremity. The patient does meet SIRS criteria to suspect possible sepsis. SIRS criteria met in the emergency department includes: WBC >12 or <4, HR >90 bpm    A source of infection has been identified or suspected to be lower extremity cellulitis and open wound. The patient does meet criteria for severe sepsis.   The following signs of end-organ dysfunction are present: Lactate > 2. This patient does not meet criteria for septic shock, including a MAP below 65 on more than 1 sequential blood pressure measurements, and or lactic acid greater than or equal to 4.0, despite adequate fluid resuscitation of 30mL/kg. Sepsis Re-Assessment Documentation:     Date: 6/25/2021   Time: 6:47 PM      Vital Signs  Level of Consciousness: Alert (0)  Temp: 98 °F (36.7 °C)  Temp Source: Oral  Pulse (Heart Rate): (!) 134  Resp Rate: 20  BP: (!) 129/97  MAP (Calculated): 108  BP 1 Location: Left arm  BP 1 Method: Automatic  BP Patient Position: At rest  MEWS Score: 4      ED Course:   Initial assessment performed. The patients presenting problems have been discussed, and they are in agreement with the care plan formulated and outlined with them. I have encouraged them to ask questions as they arise throughout their visit.          Orders Placed This Encounter    CULTURE, BLOOD, PAIRED    XR TIB/FIB LT    XR FOOT LT MIN 3 V    CBC WITH AUTOMATED DIFF    COMPREHENSIVE METABOLIC PANEL    LACTIC ACID, PLASMA    LACTIC ACID, PLASMA    ADULT DIET Regular    VITAL SIGNS - PER UNIT ROUTINE    STRICT I & O    NEUROLOGIC STATUS ASSESSMENT - PER UNIT ROUTINE    NOTIFY PROVIDER: SPECIFY Notify provider on pt's arrival to floor ONE TIME STAT    OUT OF BED WITH ASSISTANCE    VITAL SIGNS PER UNIT ROUTINE    FULL CODE    CONTACT ISOLATION    EKG, 12 LEAD, INITIAL    SALINE LOCK IV ONE TIME STAT    SALINE LOCK IV ONE TIME STAT    sodium chloride (NS) flush 5-10 mL    linezolid in dextrose 5% (ZYVOX) IVPB premix in D5W 600 mg    meropenem (MERREM) 500 mg in 0.9% sodium chloride (MBP/ADV) 50 mL MBP    sodium chloride 0.9 % bolus infusion 1,000 mL    morphine injection 4 mg    levothyroxine (SYNTHROID) 125 mcg tablet    mycophenolate (CELLCEPT) 500 mg tablet    ibuprofen (MOTRIN) 200 mg tablet    acetaminophen (TYLENOL) tablet 650 mg    ondansetron (ZOFRAN) injection 4 mg    IP CONSULT TO HOSPITALIST       EKG    Date/Time: 6/25/2021 2:21 PM  Performed by: Licha Sainz MD  Authorized by: Licha Sainz MD     ECG reviewed by ED Physician in the absence of a cardiologist: yes    Rate:     ECG rate:  95    ECG rate assessment: normal    Rhythm:     Rhythm: sinus rhythm    Ectopy:     Ectopy: none    QRS:     QRS axis:  Normal  Conduction:     Conduction: normal    ST segments:     ST segments:  Normal  T waves:     T waves: normal            Critical Care Time:   0    Disposition:  Admit        Diagnosis     Clinical Impression:   1. Sepsis with acute organ dysfunction without septic shock, due to unspecified organism, unspecified type ([address]    2. Cellulitis of left lower extremity    3.  Pyodermic gangrenosum

## 2021-06-25 NOTE — ED NOTES
Dressed the pt's wound with wet to dry. 1530- TRANSFER - OUT REPORT:    Verbal report given to Cassidy Alva (name) on Keny Sensing  being transferred to [facility](unit) for routine progression of care       Report consisted of patients Situation, Background, Assessment and   Recommendations(SBAR). Information from the following report(s) SBAR, ED Summary, [facility] and Recent Results was reviewed with the receiving nurse. Lines:   Peripheral IV 06/25/21 Right Antecubital (Active)        Opportunity for questions and clarification was provided.       Patient transported with:   Monitor

## 2021-06-25 NOTE — PROGRESS NOTES
Pharmacy Medication Reconciliation     The patient was interviewed regarding current PTA medication list, use and drug allergies;  patient present in room and obtained permission from patient to discuss drug regimen with visitor(s) present. The patient was questioned regarding use of any other inhalers, topical products, over the counter medications, herbal medications, vitamin products or ophthalmic/nasal/otic medication use. Allergy Update: Sulfamethoxazole-trimethoprim, Ciprofloxacin, Codeine, Cymbalta [duloxetine], Lyrica [pregabalin], Sulfa (sulfonamide antibiotics), Ultram [tramadol], Vancomycin, and Zosyn [piperacillin-tazobactam]    Recommendations/Findings: The following amendments were made to the patient's active medication list on file at [facility]:   1) Additions:   +mycophenolate  +ibuprofen    2) Deletions:   -naproxen    3) Changes:   (Old regimen: levothyroxine 112mcg daily /New regimen: levothyroxine 125mg daily)  Pertinent Findings:   -patient claims still taking methadone 140mg once daily for pain, however,  shows it hasn't been filled since 3/12/21  -patient has been off the chantix starter pack, so if reordered for admission, it will have to be restarted.   -patient has been on mycophenolate for arm pain    Clarified PTA med list with patient interview, Rx query, [facility]  monitoring. PTA medication list was corrected to the following:     Prior to Admission Medications   Prescriptions Last Dose Informant Taking?   allopurinol (ZYLOPRIM) 100 mg tablet 6/24/2021 at Unknown time Self Yes   Sig: Take 1 Tab by mouth daily. Indications: treatment to prevent acute gout attack   aspirin 81 mg chewable tablet 6/24/2021 at Unknown time Self Yes   Sig: Take 81 mg by mouth daily. colchicine 0.6 mg tablet  Self Yes   Sig: Take 0.6 mg by mouth daily as needed for Gout or Pain.  Indications: acute inflammation of the joints due to gout attack   gabapentin (NEURONTIN) 100 mg capsule 6/24/2021 at Unknown time Significant Other Yes   Sig: Take 100 mg by mouth three (3) times daily. ibuprofen (MOTRIN) 200 mg tablet  at . Paul Tonygerard Yes   Sig: Take 400 mg by mouth every six (6) hours as needed for Pain.   levothyroxine (SYNTHROID) 125 mcg tablet 6/25/2021 at 0800 Self Yes   Sig: Take 125 mcg by mouth Daily (before breakfast). lidocaine (XYLOCAINE) 4 % topical cream  Self Yes   Sig: Apply  to affected area as needed for Pain (apply to left lower leg wounds during dressing changes). lisinopriL (PRINIVIL, ZESTRIL) 20 mg tablet 6/25/2021 at Unknown time Self Yes   Sig: Take 20 mg by mouth daily. melatonin 3 mg tablet  Self Yes   Sig: Take 1 Tab by mouth nightly as needed. methadone (DOLOPHINE) 10 mg/mL solution 6/25/2021 at Unknown time Self Yes   Sig: Take 140 mg by mouth daily. Indications: excessive pain   mycophenolate (CELLCEPT) 500 mg tablet  Self Yes   Sig: Take 500 mg by mouth two (2) times a day. pantoprazole (PROTONIX) 40 mg tablet  Self Yes   Sig: Take 40 mg by mouth daily as needed. senna-docusate (DARELL-COLACE) 8.6-50 mg per tablet 6/25/2021 at Unknown time Self Yes   Sig: Take 1 Tablet by mouth daily. Indications: constipation   tamsulosin (FLOMAX) 0.4 mg capsule 6/24/2021 at Unknown time Self Yes   Sig: Take 1 capsule by mouth daily after dinner    varenicline (Chantix Starting Month Box) 0.5 mg (11)- 1 mg (42) DsPk  Self No   Sig: Take  by mouth.  Take one tablet by mouth in morning with food for 3 days then increase to 1 tablet by mouth twice daily with food thereafter as directed   Indications: stop smoking      Facility-Administered Medications: None        Thank you,  ASHLEY Glass

## 2021-06-25 NOTE — H&P
Hospitalist Admission Note    NAME: Alberta Chaves   :  1965   MRN:  [id number]     Date/Time:  2021 2:49 PM    Patient PCP: None   Dermatologist:  Dr. Toshia Sullivan in [place]    Please note that this dictation was completed with [employer], the computer voice recognition software. Quite often unanticipated grammatical, syntax, homophones, and other interpretive errors are inadvertently transcribed by the computer software. Please disregard these errors. Please excuse any errors that have escaped final proofreading  ______________________________________________________________________   Assessment & Plan:  Severe sepsis POA  Left lower leg and foot infection with tissue necrosis in setting of chronic left lower leg ulcerated wound from trauma 2 years ago and pyoderma gangrenosum   --lactic 2.4, heart rate 134, white count 22,000. Large ulceration of left lower leg and foot with raised border with surrounding redness, foul purulent drainag from left leg with areas of black eschar in left calf but primarily yellow fibrinous necrotic tissue cover  of left lower leg and  --continue zyvox and merrem as wound cx  with ESBL Coli, pseudomonas, enterofaecalis. --hold cellcept due to severe sepsis  --consult surgery as he needs amputation; seen Dr. Lemuel Bonilla 2 months ago. He has unrealistic expectation that this will heal despite recurrent infections over past 2 years. Discussed with Brett Shahid who request psychiatry consult as patient has refused amputation in April.  --wound care consult  --IV dilaudid and oxycodone prn with prn narcan. Continue methadone 140mg daily for chronic pain. --neurontin  --given 1L NS in ER. Bolus with 2nd liter and then 130ml/hr.   Repeat lactic 1.8    GI upset from recent nsaid use  GERD  --pepcid bid    HTN  --continue lisinopril    Gout  --continue allopurinol    Hypothyroid  --continue levothyroxine  Hx bladder cancer  Hx stroke from aneurysm with left sided weakness  --continue aspirin  Obesity      Body mass index is 31.66 kg/m². Code:  DVT prophylaxis:  Surrogate decision maker:            Subjective:   CHIEF COMPLAINT:  Left foot pain, fever, chills, n/v    HISTORY OF PRESENT ILLNESS:     Elliot Montejo is a 63 y.o. male who has chronic ulceration left lower leg and foot after water heater fell on it 2 years ago. Also diagnosed with pyoderma gangrenosum by biopsy. Past 5 days with fever 101, chills, nausea and vomiting. Past 2 days severe pain in left leg. Hospitalized 4/21 for left leg infection. Put on zosyn and merrem by ID for wound culture growing out pseudomonas, ESBL E. Coli and enterofacaelis. MRI left lower leg without osteomyelitis. Seen by general surgeon and then orthopedic surgeon and amputation (through the knee vs AKA) recommended but patient would not make a decision. We were asked to admit for work up and evaluation of the above problems. Past Medical History:   Diagnosis Date    Aneurysm ([address]     (with stroke) brain    Bladder tumor     Cancer ([address]     bladder CA    Chronic pain     Family history of bladder cancer     GERD (gastroesophageal reflux disease)     Gout     History of vascular access device 04/25/2019    [facility] VAT 4 FR MIDLINE R Cephalic Limited access    History of vascular access device 04/26/2019    4 fr Midline right Cephalic midline access    Hypercholesterolemia     Hypertension     Lesion of bladder     Seizures ([address]     Stroke ([address] 2006    left sided weakness    Thromboembolus ([address]     left leg    Thyroid disease     TIA (transient ischemic attack) 2012      Past Surgical History:   Procedure Laterality Date    HX ORTHOPAEDIC      R arthroscopy    HX OTHER SURGICAL      x2 L foot    HX UROLOGICAL  04/20/2018    Cystoscopy, TURBT (greater than 5 cm resected) Dr. Clare Alatorre, [facility].     KNEE ARTHROSCP HARV      left knee    TRANSURETHRAL RESEC BLADDER NECK  08/10/2018     Social History     Tobacco Use    Smoking status: Current Every Day Smoker     Packs/day: 0.50    Smokeless tobacco: Never Used    Tobacco comment: instructed not to smoke 24 hrs prior surgery   Substance Use Topics    Alcohol use: Yes     Alcohol/week: 6.0 standard drinks     Types: 6 Cans of beer per week     Comment: occasional      Drug use:        Family History   Problem Relation Age of Onset    Diabetes Father     Lung Disease Father     Diabetes Sister     Diabetes Brother     Cancer Paternal Grandfather         Bladder     Allergies   Allergen Reactions    Sulfamethoxazole-Trimethoprim Rash     L eye and L hand    Ciprofloxacin Hives     Per pcp records    Codeine Hives     Tolerates dilaudid, oxycodone    Cymbalta [Duloxetine] Other (comments)     Confusion and memory loss    Lyrica [Pregabalin] Hives    Sulfa (Sulfonamide Antibiotics) Rash    Ultram [Tramadol] Hives    Vancomycin Shortness of Breath    Zosyn [Piperacillin-Tazobactam] Rash        Prior to Admission medications    Medication Sig Start Date End Date Taking? Authorizing Provider   levothyroxine (SYNTHROID) 125 mcg tablet Take 125 mcg by mouth Daily (before breakfast). Yes Provider, Historical   mycophenolate (CELLCEPT) 500 mg tablet Take 500 mg by mouth two (2) times a day. 6/7/21  Yes Provider, Historical   ibuprofen (MOTRIN) 200 mg tablet Take 400 mg by mouth every six (6) hours as needed for Pain. Yes Provider, Historical   colchicine 0.6 mg tablet Take 0.6 mg by mouth daily as needed for Gout or Pain. Indications: acute inflammation of the joints due to gout attack   Yes Provider, Historical   tamsulosin (FLOMAX) 0.4 mg capsule Take 1 capsule by mouth daily after dinner  3/2/21  Yes William Gutierrez MD   lidocaine (XYLOCAINE) 4 % topical cream Apply  to affected area as needed for Pain (apply to left lower leg wounds during dressing changes).  12/22/20  Yes Dre Kent MD   gabapentin (NEURONTIN) 100 mg capsule Take 100 mg by mouth three (3) times daily. Yes Provider, Historical   lisinopriL (PRINIVIL, ZESTRIL) 20 mg tablet Take 20 mg by mouth daily. Yes Provider, Historical   aspirin 81 mg chewable tablet Take 81 mg by mouth daily. Yes Provider, Historical   pantoprazole (PROTONIX) 40 mg tablet Take 40 mg by mouth daily as needed. 7/17/19  Yes Provider, Historical   allopurinol (ZYLOPRIM) 100 mg tablet Take 1 Tab by mouth daily. Indications: treatment to prevent acute gout attack 5/24/19  Yes Trisha Maguire NP   senna-docusate (DARELL-COLACE) 8.6-50 mg per tablet Take 1 Tablet by mouth daily. Indications: constipation   Yes Provider, Historical   methadone (DOLOPHINE) 10 mg/mL solution Take 140 mg by mouth daily. Indications: excessive pain   Yes Provider, Historical   melatonin 3 mg tablet Take 1 Tab by mouth nightly as needed. 10/12/18  Yes Cheyenne Beebe MD   varenicline (Chantix Starting Month Box) 0.5 mg (11)- 1 mg (42) DsPk Take  by mouth. Take one tablet by mouth in morning with food for 3 days then increase to 1 tablet by mouth twice daily with food thereafter as directed   Indications: stop smoking    Provider, Historical     REVIEW OF SYSTEMS:  POSITIVE= Bold.   Negative = normal text  General:  fever, chills, sweats, generalized weakness, weight loss/gain, loss of appetite  Eyes:  blurred vision, eye pain, loss of vision, diplopia  Ear Nose and Throat:  rhinorrhea, pharyngitis  Respiratory:   cough, sputum production, SOB, wheezing, BECK, pleuritic pain  Cardiology:  chest pain, palpitations, orthopnea, PND, edema, syncope   Gastrointestinal:  abdominal pain, N/V, dysphagia, diarrhea, constipation, bleeding  Genitourinary:  frequency, urgency, dysuria, hematuria, incontinence  Muskuloskeletal :  arthralgia, myalgia  Hematology:  easy bruising, bleeding, lymphadenopathy  Dermatological:  rash, ulceration, pruritis  Endocrine:  hot flashes or polydipsia  Neurological:  headache, dizziness, confusion, focal weakness, paresthesia, memory loss, gait disturbance  Psychological: anxiety, depression, agitation      Objective:   VITALS:    Visit Vitals  BP (!) 129/97 (BP 1 Location: Left arm, BP Patient Position: At rest)   Pulse (!) 134   Temp 98 °F (36.7 °C)   Resp 20   Ht 6' 1\" (1.854 m)   Wt 108.9 kg (240 lb)   SpO2 97%   BMI 31.66 kg/m²     Temp (24hrs), Av °F (36.7 °C), Min:98 °F (36.7 °C), Max:98 °F (36.7 °C)    Body mass index is 31.66 kg/m². PHYSICAL EXAM:    General:    Alert, obese male, cooperative, no distress, appears stated age. HEENT: Atraumatic, anicteric sclerae, pink conjunctivae     No oral ulcers, mucosa dry, throat clear. Hearing intact. Neck:  Supple, symmetrical,  thyroid: non tender  Lungs:   Clear to auscultation bilaterally. No Wheezing or Rhonchi. No rales. Chest wall:  No tenderness  No Accessory muscle use. Heart:   Regular  rhythm,  No  murmur   No gallop. No edema. Abdomen:   Soft, non-tender. Not distended. Bowel sounds normal. No masses  Extremities: No cyanosis. No clubbing  Skin:     Not pale Not Jaundiced        Foul odor after dressing removed, redness around ulcer margin in left calf. Psych:  Not depressed. Not anxious or agitated. Neurologic: EOMs intact. No facial asymmetry. No aphasia or slurred speech. Alert and oriented X 3.      IMAGING RESULTS:   []       I have personally reviewed the actual   []     CXR  []     CT scan  CXR:  CT :  EKG:   ________________________________________________________________________  Care Plan discussed with:    Comments   Patient y    Family   Called caregiver Chinmay Spear, no answer   RN     Care Manager                    Consultant:  lorenzo Velazquez   ________________________________________________________________________  Prophylaxis:  GI pepcid   DVT lovenox   ________________________________________________________________________  Recommended Disposition: TBD  Home with Family    HH/PT/OT/RN    SNF/LTC    [facility] ________________________________________________________________________  Code Status:  Full Code y   DNR/DNI    ________________________________________________________________________  TOTAL TIME:  50 minutes      Comments     Reviewed previous records   >50% of visit spent in counseling and coordination of care  Discussion with patient and/or family and questions answered         ______________________________________________________________________  Quinn Shre MD      Procedures: see electronic medical records for all procedures/Xrays and details which were not copied into this note but were reviewed prior to creation of Plan. LAB DATA REVIEWED:    Recent Results (from the past 24 hour(s))   CBC WITH AUTOMATED DIFF    Collection Time: 06/25/21 12:04 PM   Result Value Ref Range    WBC 21.7 (H) 4.1 - 11.1 K/uL    RBC 4.50 4. 10 - 5.70 M/uL    HGB 12.9 12.1 - 17.0 g/dL    HCT 39.6 36.6 - 50.3 %    MCV 88.0 80.0 - 99.0 FL    MCH 28.7 26.0 - 34.0 PG    MCHC 32.6 30.0 - 36.5 g/dL    RDW 15.6 (H) 11.5 - 14.5 %    PLATELET 016 (H) 723 - 400 K/uL    MPV 8.9 8.9 - 12.9 FL    NRBC 0.0 0  WBC    ABSOLUTE NRBC 0.00 0.00 - 0.01 K/uL    NEUTROPHILS 71 32 - 75 %    BAND NEUTROPHILS 1 %    LYMPHOCYTES 13 12 - 49 %    MONOCYTES 13 5 - 13 %    EOSINOPHILS 1 0 - 7 %    BASOPHILS 0 0 - 1 %    METAMYELOCYTES 1 %    IMMATURE GRANULOCYTES 0 0.0 - 0.5 %    ABS. NEUTROPHILS 15.6 (H) 1.8 - 8.0 K/UL    ABS. LYMPHOCYTES 2.8 0.8 - 3.5 K/UL    ABS. MONOCYTES 2.8 (H) 0.0 - 1.0 K/UL    ABS. EOSINOPHILS 0.2 0.0 - 0.4 K/UL    ABS. BASOPHILS 0.0 0.0 - 0.1 K/UL    ABS. IMM.  GRANS. 0.0 0.00 - 0.04 K/UL    DF MANUAL      RBC COMMENTS ANISOCYTOSIS  1+        WBC COMMENTS VACUOLATED POLYS     METABOLIC PANEL, COMPREHENSIVE    Collection Time: 06/25/21 12:04 PM   Result Value Ref Range    Sodium 129 (L) 136 - 145 mmol/L    Potassium 4.3 3.5 - 5.1 mmol/L    Chloride 95 (L) 97 - 108 mmol/L    CO2 28 21 - 32 mmol/L    Anion gap 6 5 - 15 mmol/L    Glucose 113 (H) 65 - 100 mg/dL    BUN 12 6 - 20 MG/DL    Creatinine 0.95 0.70 - 1.30 MG/DL    BUN/Creatinine ratio 13 12 - 20      GFR est AA >60 >60 ml/min/1.73m2    GFR est non-AA >60 >60 ml/min/1.73m2    Calcium 8.9 8.5 - 10.1 MG/DL    Bilirubin, total 0.5 0.2 - 1.0 MG/DL    ALT (SGPT) 14 12 - 78 U/L    AST (SGOT) 8 (L) 15 - 37 U/L    Alk.  phosphatase 165 (H) 45 - 117 U/L    Protein, total 6.9 6.4 - 8.2 g/dL    Albumin 2.9 (L) 3.5 - 5.0 g/dL    Globulin 4.0 2.0 - 4.0 g/dL    A-G Ratio 0.7 (L) 1.1 - 2.2     LACTIC ACID    Collection Time: 06/25/21 12:04 PM   Result Value Ref Range    Lactic acid 2.4 (HH) 0.4 - 2.0 MMOL/L   EKG, 12 LEAD, INITIAL    Collection Time: 06/25/21  2:21 PM   Result Value Ref Range    Ventricular Rate 95 BPM    Atrial Rate 95 BPM    P-R Interval 156 ms    QRS Duration 88 ms    Q-T Interval 396 ms    QTC Calculation (Bezet) 497 ms    Calculated P Axis 42 degrees    Calculated R Axis 20 degrees    Calculated T Axis 10 degrees    Diagnosis       Normal sinus rhythm  Prolonged QT  When compared with ECG of 15-APR-2021 07:35,  Non-specific change in ST segment in Inferior leads

## 2021-06-26 LAB
ANION GAP SERPL CALC-SCNC: 8 MMOL/L (ref 5–15)
BASOPHILS # BLD: 0.1 K/UL (ref 0–0.1)
BASOPHILS NFR BLD: 1 % (ref 0–1)
BUN SERPL-MCNC: 10 MG/DL (ref 6–20)
BUN/CREAT SERPL: 18 (ref 12–20)
CALCIUM SERPL-MCNC: 7.8 MG/DL (ref 8.5–10.1)
CHLORIDE SERPL-SCNC: 101 MMOL/L (ref 97–108)
CO2 SERPL-SCNC: 21 MMOL/L (ref 21–32)
CREAT SERPL-MCNC: 0.57 MG/DL (ref 0.7–1.3)
DIFFERENTIAL METHOD BLD: ABNORMAL
EOSINOPHIL # BLD: 0.4 K/UL (ref 0–0.4)
EOSINOPHIL NFR BLD: 3 % (ref 0–7)
ERYTHROCYTE [DISTWIDTH] IN BLOOD BY AUTOMATED COUNT: 15.3 % (ref 11.5–14.5)
GLUCOSE SERPL-MCNC: 125 MG/DL (ref 65–100)
HCT VFR BLD AUTO: 31.7 % (ref 36.6–50.3)
HGB BLD-MCNC: 10.5 G/DL (ref 12.1–17)
IMM GRANULOCYTES # BLD AUTO: 0.4 K/UL (ref 0–0.04)
IMM GRANULOCYTES NFR BLD AUTO: 3 % (ref 0–0.5)
LYMPHOCYTES # BLD: 2 K/UL (ref 0.8–3.5)
LYMPHOCYTES NFR BLD: 15 % (ref 12–49)
MCH RBC QN AUTO: 28.8 PG (ref 26–34)
MCHC RBC AUTO-ENTMCNC: 33.1 G/DL (ref 30–36.5)
MCV RBC AUTO: 86.8 FL (ref 80–99)
MONOCYTES # BLD: 1.5 K/UL (ref 0–1)
MONOCYTES NFR BLD: 11 % (ref 5–13)
NEUTS SEG # BLD: 9.1 K/UL (ref 1.8–8)
NEUTS SEG NFR BLD: 67 % (ref 32–75)
NRBC # BLD: 0 K/UL (ref 0–0.01)
NRBC BLD-RTO: 0 PER 100 WBC
PLATELET # BLD AUTO: 424 K/UL (ref 150–400)
PMV BLD AUTO: 8.6 FL (ref 8.9–12.9)
POTASSIUM SERPL-SCNC: 3.9 MMOL/L (ref 3.5–5.1)
RBC # BLD AUTO: 3.65 M/UL (ref 4.1–5.7)
RBC MORPH BLD: ABNORMAL
SODIUM SERPL-SCNC: 130 MMOL/L (ref 136–145)
WBC # BLD AUTO: 13.5 K/UL (ref 4.1–11.1)

## 2021-06-26 PROCEDURE — 80048 BASIC METABOLIC PNL TOTAL CA: CPT

## 2021-06-26 PROCEDURE — 74011250636 HC RX REV CODE- 250/636: Performed by: EMERGENCY MEDICINE

## 2021-06-26 PROCEDURE — 74011250636 HC RX REV CODE- 250/636: Performed by: STUDENT IN AN ORGANIZED HEALTH CARE EDUCATION/TRAINING PROGRAM

## 2021-06-26 PROCEDURE — 74011250637 HC RX REV CODE- 250/637: Performed by: STUDENT IN AN ORGANIZED HEALTH CARE EDUCATION/TRAINING PROGRAM

## 2021-06-26 PROCEDURE — 74011250637 HC RX REV CODE- 250/637: Performed by: HOSPITALIST

## 2021-06-26 PROCEDURE — 85025 COMPLETE CBC W/AUTO DIFF WBC: CPT

## 2021-06-26 PROCEDURE — 74011000258 HC RX REV CODE- 258: Performed by: EMERGENCY MEDICINE

## 2021-06-26 PROCEDURE — 36415 COLL VENOUS BLD VENIPUNCTURE: CPT

## 2021-06-26 PROCEDURE — 74011250636 HC RX REV CODE- 250/636: Performed by: HOSPITALIST

## 2021-06-26 PROCEDURE — 65660000001 HC RM ICU INTERMED STEPDOWN

## 2021-06-26 PROCEDURE — 74011000250 HC RX REV CODE- 250: Performed by: STUDENT IN AN ORGANIZED HEALTH CARE EDUCATION/TRAINING PROGRAM

## 2021-06-26 RX ORDER — LIDOCAINE 40 MG/G
CREAM TOPICAL AS NEEDED
Status: DISCONTINUED | OUTPATIENT
Start: 2021-06-26 | End: 2021-06-27

## 2021-06-26 RX ORDER — COLCHICINE 0.6 MG/1
0.6 TABLET ORAL 3 TIMES DAILY
Status: COMPLETED | OUTPATIENT
Start: 2021-06-26 | End: 2021-06-26

## 2021-06-26 RX ORDER — OXYCODONE HYDROCHLORIDE 5 MG/1
5 TABLET ORAL
Status: DISCONTINUED | OUTPATIENT
Start: 2021-06-26 | End: 2021-06-27

## 2021-06-26 RX ADMIN — DOCUSATE SODIUM 50MG AND SENNOSIDES 8.6MG 1 TABLET: 8.6; 5 TABLET, FILM COATED ORAL at 09:56

## 2021-06-26 RX ADMIN — COLCHICINE 0.6 MG: 0.6 TABLET, FILM COATED ORAL at 11:56

## 2021-06-26 RX ADMIN — Medication 3 MG: at 01:17

## 2021-06-26 RX ADMIN — LISINOPRIL 20 MG: 20 TABLET ORAL at 09:56

## 2021-06-26 RX ADMIN — SODIUM CHLORIDE 100 ML/HR: 9 INJECTION, SOLUTION INTRAVENOUS at 21:39

## 2021-06-26 RX ADMIN — TAMSULOSIN HYDROCHLORIDE 0.4 MG: 0.4 CAPSULE ORAL at 09:56

## 2021-06-26 RX ADMIN — SODIUM CHLORIDE 130 ML/HR: 9 INJECTION, SOLUTION INTRAVENOUS at 06:18

## 2021-06-26 RX ADMIN — SODIUM CHLORIDE 10 ML: 9 INJECTION, SOLUTION INTRAMUSCULAR; INTRAVENOUS; SUBCUTANEOUS at 21:30

## 2021-06-26 RX ADMIN — MEROPENEM 500 MG: 500 INJECTION, POWDER, FOR SOLUTION INTRAVENOUS at 01:27

## 2021-06-26 RX ADMIN — ALLOPURINOL 100 MG: 100 TABLET ORAL at 09:56

## 2021-06-26 RX ADMIN — MEROPENEM 500 MG: 500 INJECTION, POWDER, FOR SOLUTION INTRAVENOUS at 18:37

## 2021-06-26 RX ADMIN — OXYCODONE HYDROCHLORIDE 5 MG: 5 TABLET ORAL at 16:22

## 2021-06-26 RX ADMIN — HYDROMORPHONE HYDROCHLORIDE 1 MG: 2 INJECTION, SOLUTION INTRAMUSCULAR; INTRAVENOUS; SUBCUTANEOUS at 10:05

## 2021-06-26 RX ADMIN — COLCHICINE 0.6 MG: 0.6 TABLET, FILM COATED ORAL at 18:37

## 2021-06-26 RX ADMIN — HYDROMORPHONE HYDROCHLORIDE 1 MG: 2 INJECTION, SOLUTION INTRAMUSCULAR; INTRAVENOUS; SUBCUTANEOUS at 22:49

## 2021-06-26 RX ADMIN — METHADONE HYDROCHLORIDE 140 MG: 10 CONCENTRATE ORAL at 09:56

## 2021-06-26 RX ADMIN — HYDROMORPHONE HYDROCHLORIDE 1 MG: 2 INJECTION, SOLUTION INTRAMUSCULAR; INTRAVENOUS; SUBCUTANEOUS at 14:02

## 2021-06-26 RX ADMIN — Medication 3 MG: at 22:52

## 2021-06-26 RX ADMIN — MEROPENEM 500 MG: 500 INJECTION, POWDER, FOR SOLUTION INTRAVENOUS at 06:16

## 2021-06-26 RX ADMIN — MEROPENEM 500 MG: 500 INJECTION, POWDER, FOR SOLUTION INTRAVENOUS at 23:02

## 2021-06-26 RX ADMIN — OXYCODONE HYDROCHLORIDE 5 MG: 5 TABLET ORAL at 11:56

## 2021-06-26 RX ADMIN — HYDROMORPHONE HYDROCHLORIDE 1 MG: 2 INJECTION, SOLUTION INTRAMUSCULAR; INTRAVENOUS; SUBCUTANEOUS at 06:30

## 2021-06-26 RX ADMIN — ENOXAPARIN SODIUM 40 MG: 40 INJECTION SUBCUTANEOUS at 09:56

## 2021-06-26 RX ADMIN — GABAPENTIN 100 MG: 100 CAPSULE ORAL at 09:56

## 2021-06-26 RX ADMIN — LINEZOLID 600 MG: 600 INJECTION, SOLUTION INTRAVENOUS at 09:58

## 2021-06-26 RX ADMIN — OXYCODONE HYDROCHLORIDE 5 MG: 5 TABLET ORAL at 21:30

## 2021-06-26 RX ADMIN — MEROPENEM 500 MG: 500 INJECTION, POWDER, FOR SOLUTION INTRAVENOUS at 11:56

## 2021-06-26 RX ADMIN — ASPIRIN 81 MG: 81 TABLET, CHEWABLE ORAL at 09:56

## 2021-06-26 RX ADMIN — SODIUM CHLORIDE 10 ML: 9 INJECTION, SOLUTION INTRAMUSCULAR; INTRAVENOUS; SUBCUTANEOUS at 05:16

## 2021-06-26 RX ADMIN — COLCHICINE 0.6 MG: 0.6 TABLET, FILM COATED ORAL at 21:46

## 2021-06-26 RX ADMIN — SODIUM CHLORIDE 100 ML/HR: 9 INJECTION, SOLUTION INTRAVENOUS at 21:30

## 2021-06-26 RX ADMIN — LEVOTHYROXINE SODIUM 125 MCG: 0.12 TABLET ORAL at 05:16

## 2021-06-26 RX ADMIN — GABAPENTIN 100 MG: 100 CAPSULE ORAL at 16:22

## 2021-06-26 RX ADMIN — SODIUM CHLORIDE 10 ML: 9 INJECTION, SOLUTION INTRAMUSCULAR; INTRAVENOUS; SUBCUTANEOUS at 14:00

## 2021-06-26 RX ADMIN — LIDOCAINE: 40 CREAM TOPICAL at 16:51

## 2021-06-26 RX ADMIN — HYDROMORPHONE HYDROCHLORIDE 1 MG: 2 INJECTION, SOLUTION INTRAMUSCULAR; INTRAVENOUS; SUBCUTANEOUS at 18:37

## 2021-06-26 RX ADMIN — Medication 1 CAPSULE: at 09:56

## 2021-06-26 RX ADMIN — FAMOTIDINE 20 MG: 20 TABLET ORAL at 18:37

## 2021-06-26 RX ADMIN — OXYCODONE HYDROCHLORIDE 5 MG: 5 TABLET ORAL at 05:14

## 2021-06-26 RX ADMIN — LINEZOLID 600 MG: 600 INJECTION, SOLUTION INTRAVENOUS at 21:38

## 2021-06-26 RX ADMIN — FAMOTIDINE 20 MG: 20 TABLET ORAL at 09:56

## 2021-06-26 RX ADMIN — HYDROMORPHONE HYDROCHLORIDE 1 MG: 2 INJECTION, SOLUTION INTRAMUSCULAR; INTRAVENOUS; SUBCUTANEOUS at 01:11

## 2021-06-26 RX ADMIN — GABAPENTIN 100 MG: 100 CAPSULE ORAL at 21:30

## 2021-06-26 NOTE — PROGRESS NOTES
Problem: Risk for Spread of Infection  Goal: Prevent transmission of infectious organism to others  Description: Prevent the transmission of infectious organisms to other patients, staff members, and visitors. Outcome: Progressing Towards Goal     Problem: Patient Education:  Go to Education Activity  Goal: Patient/Family Education  Outcome: Progressing Towards Goal     Problem: Falls - Risk of  Goal: *Absence of Falls  Description: Document Yony Foster Fall Risk and appropriate interventions in the flowsheet.   Outcome: Progressing Towards Goal  Note: Fall Risk Interventions:  Mobility Interventions: Bed/chair exit alarm         Medication Interventions: Bed/chair exit alarm    Elimination Interventions: Bed/chair exit alarm    History of Falls Interventions: Bed/chair exit alarm

## 2021-06-26 NOTE — PROGRESS NOTES
Problem: Falls - Risk of  Goal: *Absence of Falls  Description: Document Colton Allenasin Fall Risk and appropriate interventions in the flowsheet.   Outcome: Progressing Towards Goal  Note: Fall Risk Interventions:  Mobility Interventions: Bed/chair exit alarm, Patient to call before getting OOB         Medication Interventions: Bed/chair exit alarm, Patient to call before getting OOB, Teach patient to arise slowly    Elimination Interventions: Bed/chair exit alarm, Call light in reach, Urinal in reach    History of Falls Interventions: Bed/chair exit alarm, Door open when patient unattended, Investigate reason for fall

## 2021-06-26 NOTE — PROGRESS NOTES
[id number] This RN performed wound care on left lower leg. Patient poorly tolerated. End of Shift Note    Bedside shift change report given to [address] [place] [address] (oncoming nurse) by Johnie Moseley (offgoing nurse). Report included the following information SBAR, Kardex and ED Summary    Shift worked:  4726-3036     Shift summary and any significant changes:     Pain maintained. Wound care completed by RN. Psych form faxed. Continue abx. Concerns for physician to address:       Zone phone for oncoming shift:          Activity:  Activity Level: Bed Rest  Number times ambulated in hallways past shift: 0  Number of times OOB to chair past shift: 0    Cardiac:   Cardiac Monitoring: Yes      Cardiac Rhythm: Sinus Rhythm    Access:   Current line(s): PIV     Genitourinary:   Urinary status: voiding    Respiratory:   O2 Device: None (Room air)  Chronic home O2 use?: NO  Incentive spirometer at bedside: YES     GI:     Current diet:  ADULT DIET Regular  Passing flatus: YES  Tolerating current diet: YES       Pain Management:   Patient states pain is manageable on current regimen: YES    Skin:  Dejuan Score: 16  Interventions: speciality bed    Patient Safety:  Fall Score:  Total Score: 3  Interventions: bed/chair alarm  High Fall Risk: Yes    Length of Stay:  Expected LOS: - - -  Actual LOS: 1      Johnie Moseley

## 2021-06-26 NOTE — PROGRESS NOTES
End of Shift Note    Bedside shift change report given to My De Los Santos RN (oncoming nurse) by Cheli Cao RN (offgoing nurse). Report included the following information SBAR    Shift worked:  night     Shift summary and any significant changes:    Requesting pain meds around clock,      Concerns for physician to address: Patient complaining of gi pain when having to take any pill orally     Zone phone for oncoming shift:         Activity:  Activity Level: Bed Rest  Number times ambulated in hallways past shift: 0  Number of times OOB to chair past shift: 0    Cardiac:   Cardiac Monitoring: Yes      Cardiac Rhythm: Sinus Rhythm    Access:   Current line(s): PIV     Genitourinary:   Urinary status: voiding    Respiratory:   O2 Device: None (Room air)  Chronic home O2 use?: NO  Incentive spirometer at bedside: NO     GI:     Current diet:  ADULT DIET Regular; Low Fat/Low Chol/High Fiber/YOVANY  Passing flatus: YES  Tolerating current diet: YES       Pain Management:   Patient states pain is manageable on current regimen: NO    Skin:  Dejuan Score: 16  Interventions: float heels and nutritional support     Patient Safety:  Fall Score:  Total Score: 3  Interventions: bed/chair alarm, assistive device (walker, cane, etc), gripper socks, pt to call before getting OOB and stay with me (per policy)  High Fall Risk: Yes    Length of Stay:  Expected LOS: - - -  Actual LOS: 1      Cheli Cao RN

## 2021-06-26 NOTE — PROGRESS NOTES
Hospitalist Progress Note    NAME: Emiliano Cruz   :  1965   MRN:  [id number]       Assessment / Plan:    Severe sepsis POA  Left lower leg and foot infection with tissue necrosis in setting of chronic left lower leg ulcerated wound from trauma 2 years ago and pyoderma gangrenosum   --lactic 2.4, heart rate 134, white count 22,000.   -- Large ulceration of left lower leg and foot with raised border with surrounding redness, foul purulent drainag from left leg with areas of black eschar in left calf but primarily yellow fibrinous necrotic tissue cover  of left lower leg and foot  --continue zyvox and merrem as wound cx  with ESBL Coli, pseudomonas, enterofaecalis. --hold cellcept due to severe sepsis  --consult surgery as he needs amputation; seen Dr. Shonna Aldrich 2 months ago. He has unrealistic expectation that this will heal despite recurrent infections over past 2 years. Discussed with Gm Stover who request psychiatry consult as patient has refused amputation in April.  --wound care consult  --IV dilaudid and oxycodone prn with prn narcan. Continue methadone 140mg daily for chronic pain. --neurontin  --given 1L NS in ER. Bolus with 2nd liter and then 130ml/hr. Repeat lactic 1.8. reduce fluid rate to 100cc/hr  -Had a lengthy discussion with patient about possible amputation. Patient does have a capacity to make decisions. He is too scared of losing his leg. Reports that he has lost a lot of things in his life including his sister at a young age and mother. Now he is wheelchair bound due to leg wound. The wound has been there for about 2 years and he has been dealing with infection and significant pain affecting his quality of life. He might consider amputation this time. He is to think about it and get back to me.        GI upset from recent nsaid use  GERD  --pepcid bid     HTN  --continue lisinopril     Gout flare, left hand  --continue allopurinol and add colchicine     Hypothyroid  --continue levothyroxine    Hx bladder cancer  Hx stroke from aneurysm with left sided weakness  --continue aspirin  Obesity        Body mass index is 31.66 kg/m². Estimated discharge date: 06/29  Barriers:    Code status: Full  Prophylaxis: Lovenox  Recommended Disposition: TBD     Subjective:     Chief Complaint / Reason for Physician Visit   Discussed with RN events overnight. No acute events overnight  He has significant pain at the wound site. Requests for strong pain medication. Review of Systems:  Symptom Y/N Comments  Symptom Y/N Comments   Fever/Chills    Chest Pain     Poor Appetite    Edema     Cough    Abdominal Pain     Sputum    Joint Pain     SOB/BECK    Pruritis/Rash     Nausea/vomit    Tolerating PT/OT     Diarrhea    Tolerating Diet     Constipation    Other       Could NOT obtain due to:      Objective:     VITALS:   Last 24hrs VS reviewed since prior progress note. Most recent are:  Patient Vitals for the past 24 hrs:   Temp Pulse Resp BP SpO2   06/26/21 0656 98.2 °F (36.8 °C) 70 18 (!) 154/100 95 %   06/26/21 0401 98.2 °F (36.8 °C) 77 12 123/84 --   06/25/21 2251 98.6 °F (37 °C) 89 18 104/75 95 %   06/25/21 1937 97.6 °F (36.4 °C) 89 18 110/88 96 %   06/25/21 1610 97.2 °F (36.2 °C) 93 26 135/88 97 %   06/25/21 1056 98 °F (36.7 °C) (!) 134 20 (!) 129/97 97 %       Intake/Output Summary (Last 24 hours) at 6/26/2021 0854  Last data filed at 6/25/2021 2328  Gross per 24 hour   Intake 50 ml   Output 1100 ml   Net -1050 ml        I had a face to face encounter and independently examined this patient on 6/26/2021, as outlined below:  PHYSICAL EXAM:  General: WD, WN. Alert, cooperative, no acute distress    EENT:  EOMI. Anicteric sclerae. MMM  Resp:  CTA bilaterally, no wheezing or rales. No accessory muscle use  CV:  Regular  rhythm,  No edema  GI:  Soft, Non distended, Non tender.   +Bowel sounds  Neurologic:  Alert and oriented X 3, normal speech,   Psych:   Good insight. Not anxious nor agitated  Skin:  Left leg: Large deep circumferential ulcer involving almost all skin below the mid sheen to the forefoot, yellowish fibrinous necrotic base with foul smelling odor, rolled out ulcer edge with minimal surrounding erythema      Reviewed most current lab test results and cultures  YES  Reviewed most current radiology test results   YES  Review and summation of old records today    NO  Reviewed patient's current orders and MAR    YES  PMH/SH reviewed - no change compared to H&P  ________________________________________________________________________  Care Plan discussed with:    Comments   Patient x    Family      RN x    Care Manager     Consultant                        Multidiciplinary team rounds were held today with , nursing, pharmacist and clinical coordinator. Patient's plan of care was discussed; medications were reviewed and discharge planning was addressed. ________________________________________________________________________  Total NON critical care TIME:  40   Minutes    Total CRITICAL CARE TIME Spent:   Minutes non procedure based      Comments   >50% of visit spent in counseling and coordination of care x    ________________________________________________________________________  Froylan Fox MD     Procedures: see electronic medical records for all procedures/Xrays and details which were not copied into this note but were reviewed prior to creation of Plan. LABS:  I reviewed today's most current labs and imaging studies.   Pertinent labs include:  Recent Labs     06/26/21  0358 06/25/21  1204   WBC 13.5* 21.7*   HGB 10.5* 12.9   HCT 31.7* 39.6   * 603*     Recent Labs     06/26/21  0358 06/25/21  1204   * 129*   K 3.9 4.3    95*   CO2 21 28   * 113*   BUN 10 12   CREA 0.57* 0.95   CA 7.8* 8.9   ALB  --  2.9*   TBILI  --  0.5   ALT  --  14       Signed: Froylan Fox MD

## 2021-06-26 NOTE — PROGRESS NOTES
Problem: Risk for Spread of Infection  Goal: Prevent transmission of infectious organism to others  Description: Prevent the transmission of infectious organisms to other patients, staff members, and visitors. Outcome: Progressing Towards Goal     Problem: Patient Education:  Go to Education Activity  Goal: Patient/Family Education  Outcome: Progressing Towards Goal     Problem: Falls - Risk of  Goal: *Absence of Falls  Description: Document Doe Guy Fall Risk and appropriate interventions in the flowsheet.   Outcome: Progressing Towards Goal  Note: Fall Risk Interventions:  Mobility Interventions: Bed/chair exit alarm, Patient to call before getting OOB         Medication Interventions: Bed/chair exit alarm, Patient to call before getting OOB, Teach patient to arise slowly    Elimination Interventions: Bed/chair exit alarm, Call light in reach, Toileting schedule/hourly rounds, Urinal in reach    History of Falls Interventions: Bed/chair exit alarm         Problem: Patient Education: Go to Patient Education Activity  Goal: Patient/Family Education  Outcome: Progressing Towards Goal     Problem: Patient Education: Go to Patient Education Activity  Goal: Patient/Family Education  Outcome: Progressing Towards Goal     Problem: Sepsis: Day of Diagnosis  Goal: Off Pathway (Use only if patient is Off Pathway)  Outcome: Progressing Towards Goal  Goal: *Fluid resuscitation  Outcome: Progressing Towards Goal  Goal: *Paired blood cultures prior to first dose of antibiotic  Outcome: Progressing Towards Goal  Goal: *First dose of  appropriate antibiotic within 3 hours of arrival to ED, within 1 hour of arrival to ICU  Outcome: Progressing Towards Goal  Goal: *Lactic acid with first set of blood cultures  Outcome: Progressing Towards Goal  Goal: *Pneumococcal immunization (if eligible)  Outcome: Progressing Towards Goal  Goal: *Influenza immunization (if eligible)  Outcome: Progressing Towards Goal  Goal: Activity/Safety  Outcome: Progressing Towards Goal  Goal: Consults, if ordered  Outcome: Progressing Towards Goal  Goal: Diagnostic Test/Procedures  Outcome: Progressing Towards Goal  Goal: Nutrition/Diet  Outcome: Progressing Towards Goal  Goal: Discharge Planning  Outcome: Progressing Towards Goal  Goal: Medications  Outcome: Progressing Towards Goal  Goal: Respiratory  Outcome: Progressing Towards Goal  Goal: Treatments/Interventions/Procedures  Outcome: Progressing Towards Goal  Goal: Psychosocial  Outcome: Progressing Towards Goal     Problem: Sepsis: Day 2  Goal: Off Pathway (Use only if patient is Off Pathway)  Outcome: Progressing Towards Goal  Goal: *Central Venous Pressure maintained at 8-12 mm Hg  Outcome: Progressing Towards Goal  Goal: *Hemodynamically stable  Outcome: Progressing Towards Goal  Goal: *Tolerating diet  Outcome: Progressing Towards Goal  Goal: Activity/Safety  Outcome: Progressing Towards Goal  Goal: Consults, if ordered  Outcome: Progressing Towards Goal  Goal: Diagnostic Test/Procedures  Outcome: Progressing Towards Goal  Goal: Nutrition/Diet  Outcome: Progressing Towards Goal  Goal: Discharge Planning  Outcome: Progressing Towards Goal  Goal: Medications  Outcome: Progressing Towards Goal  Goal: Respiratory  Outcome: Progressing Towards Goal  Goal: Treatments/Interventions/Procedures  Outcome: Progressing Towards Goal  Goal: Psychosocial  Outcome: Progressing Towards Goal     Problem: Sepsis: Day 3  Goal: Off Pathway (Use only if patient is Off Pathway)  Outcome: Progressing Towards Goal  Goal: *Central Venous Pressure maintained at 8-12 mm Hg  Outcome: Progressing Towards Goal  Goal: *Oxygen saturation within defined limits  Outcome: Progressing Towards Goal  Goal: *Vital sign stability  Outcome: Progressing Towards Goal  Goal: *Tolerating diet  Outcome: Progressing Towards Goal  Goal: *Demonstrates progressive activity  Outcome: Progressing Towards Goal  Goal: Activity/Safety  Outcome: Progressing Towards Goal  Goal: Consults, if ordered  Outcome: Progressing Towards Goal  Goal: Diagnostic Test/Procedures  Outcome: Progressing Towards Goal  Goal: Nutrition/Diet  Outcome: Progressing Towards Goal  Goal: Discharge Planning  Outcome: Progressing Towards Goal  Goal: Medications  Outcome: Progressing Towards Goal  Goal: Respiratory  Outcome: Progressing Towards Goal  Goal: Treatments/Interventions/Procedures  Outcome: Progressing Towards Goal  Goal: Psychosocial  Outcome: Progressing Towards Goal     Problem: Sepsis: Day 4  Goal: Off Pathway (Use only if patient is Off Pathway)  Outcome: Progressing Towards Goal  Goal: Activity/Safety  Outcome: Progressing Towards Goal  Goal: Consults, if ordered  Outcome: Progressing Towards Goal  Goal: Diagnostic Test/Procedures  Outcome: Progressing Towards Goal  Goal: Nutrition/Diet  Outcome: Progressing Towards Goal  Goal: Discharge Planning  Outcome: Progressing Towards Goal  Goal: Medications  Outcome: Progressing Towards Goal  Goal: Respiratory  Outcome: Progressing Towards Goal  Goal: Treatments/Interventions/Procedures  Outcome: Progressing Towards Goal  Goal: Psychosocial  Outcome: Progressing Towards Goal  Goal: *Oxygen saturation within defined limits  Outcome: Progressing Towards Goal  Goal: *Hemodynamically stable  Outcome: Progressing Towards Goal  Goal: *Vital signs within defined limits  Outcome: Progressing Towards Goal  Goal: *Tolerating diet  Outcome: Progressing Towards Goal  Goal: *Demonstrates progressive activity  Outcome: Progressing Towards Goal  Goal: *Fluid volume maintenance  Outcome: Progressing Towards Goal     Problem: Sepsis: Day 5  Goal: Off Pathway (Use only if patient is Off Pathway)  Outcome: Progressing Towards Goal  Goal: *Oxygen saturation within defined limits  Outcome: Progressing Towards Goal  Goal: *Vital signs within defined limits  Outcome: Progressing Towards Goal  Goal: *Tolerating diet  Outcome: Progressing Towards Goal  Goal: *Demonstrates progressive activity  Outcome: Progressing Towards Goal  Goal: *Discharge plan identified  Outcome: Progressing Towards Goal  Goal: Activity/Safety  Outcome: Progressing Towards Goal  Goal: Consults, if ordered  Outcome: Progressing Towards Goal  Goal: Diagnostic Test/Procedures  Outcome: Progressing Towards Goal  Goal: Nutrition/Diet  Outcome: Progressing Towards Goal  Goal: Discharge Planning  Outcome: Progressing Towards Goal  Goal: Medications  Outcome: Progressing Towards Goal  Goal: Respiratory  Outcome: Progressing Towards Goal  Goal: Treatments/Interventions/Procedures  Outcome: Progressing Towards Goal  Goal: Psychosocial  Outcome: Progressing Towards Goal     Problem: Sepsis: Day 6  Goal: Off Pathway (Use only if patient is Off Pathway)  Outcome: Progressing Towards Goal  Goal: *Oxygen saturation within defined limits  Outcome: Progressing Towards Goal  Goal: *Vital signs within defined limits  Outcome: Progressing Towards Goal  Goal: *Tolerating diet  Outcome: Progressing Towards Goal  Goal: *Demonstrates progressive activity  Outcome: Progressing Towards Goal  Goal: Influenza immunization  Outcome: Progressing Towards Goal  Goal: *Pneumococcal immunization  Outcome: Progressing Towards Goal  Goal: Activity/Safety  Outcome: Progressing Towards Goal  Goal: Diagnostic Test/Procedures  Outcome: Progressing Towards Goal  Goal: Nutrition/Diet  Outcome: Progressing Towards Goal  Goal: Discharge Planning  Outcome: Progressing Towards Goal  Goal: Medications  Outcome: Progressing Towards Goal  Goal: Respiratory  Outcome: Progressing Towards Goal  Goal: Treatments/Interventions/Procedures  Outcome: Progressing Towards Goal  Goal: Psychosocial  Outcome: Progressing Towards Goal     Problem: Sepsis: Discharge Outcomes  Goal: *Vital signs within defined limits  Outcome: Progressing Towards Goal  Goal: *Tolerating diet  Outcome: Progressing Towards Goal  Goal: *Verbalizes understanding and describes prescribed diet  Outcome: Progressing Towards Goal  Goal: *Demonstrates progressive activity  Outcome: Progressing Towards Goal  Goal: *Describes follow-up/return visits to physicians  Outcome: Progressing Towards Goal  Goal: *Verbalizes name, dosage, time, side effects, and number of days to continue medications  Outcome: Progressing Towards Goal  Goal: *Influenza immunization (Oct-Mar only)  Outcome: Progressing Towards Goal  Goal: *Pneumococcal immunization  Outcome: Progressing Towards Goal  Goal: *Lungs clear or at baseline  Outcome: Progressing Towards Goal  Goal: *Oxygen saturation returns to baseline or 90% or better on room air  Outcome: Progressing Towards Goal  Goal: *Glycemic control  Outcome: Progressing Towards Goal  Goal: *Absence of deep venous thrombosis signs and symptoms(Stroke Metric)  Outcome: Progressing Towards Goal  Goal: *Describes available resources and support systems  Outcome: Progressing Towards Goal  Goal: *Optimal pain control at patient's stated goal  Outcome: Progressing Towards Goal

## 2021-06-27 LAB
ANION GAP SERPL CALC-SCNC: 6 MMOL/L (ref 5–15)
BASOPHILS # BLD: 0 K/UL (ref 0–0.1)
BASOPHILS NFR BLD: 0 % (ref 0–1)
BUN SERPL-MCNC: 6 MG/DL (ref 6–20)
BUN/CREAT SERPL: 14 (ref 12–20)
CALCIUM SERPL-MCNC: 7.9 MG/DL (ref 8.5–10.1)
CHLORIDE SERPL-SCNC: 102 MMOL/L (ref 97–108)
CO2 SERPL-SCNC: 22 MMOL/L (ref 21–32)
CREAT SERPL-MCNC: 0.43 MG/DL (ref 0.7–1.3)
DIFFERENTIAL METHOD BLD: ABNORMAL
EOSINOPHIL # BLD: 0.6 K/UL (ref 0–0.4)
EOSINOPHIL NFR BLD: 5 % (ref 0–7)
ERYTHROCYTE [DISTWIDTH] IN BLOOD BY AUTOMATED COUNT: 15.6 % (ref 11.5–14.5)
ERYTHROCYTE [SEDIMENTATION RATE] IN BLOOD: 52 MM/HR (ref 0–20)
GLUCOSE SERPL-MCNC: 102 MG/DL (ref 65–100)
HCT VFR BLD AUTO: 29.5 % (ref 36.6–50.3)
HGB BLD-MCNC: 9.9 G/DL (ref 12.1–17)
IMM GRANULOCYTES # BLD AUTO: 0 K/UL (ref 0–0.04)
IMM GRANULOCYTES NFR BLD AUTO: 0 % (ref 0–0.5)
LYMPHOCYTES # BLD: 1.5 K/UL (ref 0.8–3.5)
LYMPHOCYTES NFR BLD: 13 % (ref 12–49)
MCH RBC QN AUTO: 28.9 PG (ref 26–34)
MCHC RBC AUTO-ENTMCNC: 33.6 G/DL (ref 30–36.5)
MCV RBC AUTO: 86.3 FL (ref 80–99)
METAMYELOCYTES NFR BLD MANUAL: 1 %
MONOCYTES # BLD: 0.6 K/UL (ref 0–1)
MONOCYTES NFR BLD: 5 % (ref 5–13)
NEUTS SEG # BLD: 8.6 K/UL (ref 1.8–8)
NEUTS SEG NFR BLD: 76 % (ref 32–75)
NRBC # BLD: 0 K/UL (ref 0–0.01)
NRBC BLD-RTO: 0 PER 100 WBC
PLATELET # BLD AUTO: 394 K/UL (ref 150–400)
PMV BLD AUTO: 8.6 FL (ref 8.9–12.9)
POTASSIUM SERPL-SCNC: 4.1 MMOL/L (ref 3.5–5.1)
RBC # BLD AUTO: 3.42 M/UL (ref 4.1–5.7)
RBC MORPH BLD: ABNORMAL
SODIUM SERPL-SCNC: 130 MMOL/L (ref 136–145)
WBC # BLD AUTO: 11.3 K/UL (ref 4.1–11.1)

## 2021-06-27 PROCEDURE — 80048 BASIC METABOLIC PNL TOTAL CA: CPT

## 2021-06-27 PROCEDURE — 85652 RBC SED RATE AUTOMATED: CPT

## 2021-06-27 PROCEDURE — 36415 COLL VENOUS BLD VENIPUNCTURE: CPT

## 2021-06-27 PROCEDURE — 74011250637 HC RX REV CODE- 250/637: Performed by: STUDENT IN AN ORGANIZED HEALTH CARE EDUCATION/TRAINING PROGRAM

## 2021-06-27 PROCEDURE — 74011250636 HC RX REV CODE- 250/636: Performed by: HOSPITALIST

## 2021-06-27 PROCEDURE — 74011000258 HC RX REV CODE- 258: Performed by: EMERGENCY MEDICINE

## 2021-06-27 PROCEDURE — 85025 COMPLETE CBC W/AUTO DIFF WBC: CPT

## 2021-06-27 PROCEDURE — 74011250637 HC RX REV CODE- 250/637: Performed by: HOSPITALIST

## 2021-06-27 PROCEDURE — 65660000001 HC RM ICU INTERMED STEPDOWN

## 2021-06-27 PROCEDURE — 74011250636 HC RX REV CODE- 250/636: Performed by: EMERGENCY MEDICINE

## 2021-06-27 PROCEDURE — 2709999900 HC NON-CHARGEABLE SUPPLY

## 2021-06-27 RX ORDER — NALOXONE HYDROCHLORIDE 0.4 MG/ML
0.2 INJECTION, SOLUTION INTRAMUSCULAR; INTRAVENOUS; SUBCUTANEOUS
Status: DISCONTINUED | OUTPATIENT
Start: 2021-06-27 | End: 2021-07-15 | Stop reason: HOSPADM

## 2021-06-27 RX ORDER — CALCIUM CARBONATE 200(500)MG
400 TABLET,CHEWABLE ORAL
Status: DISCONTINUED | OUTPATIENT
Start: 2021-06-27 | End: 2021-07-15 | Stop reason: HOSPADM

## 2021-06-27 RX ORDER — LIDOCAINE 40 MG/G
CREAM TOPICAL AS NEEDED
Status: DISCONTINUED | OUTPATIENT
Start: 2021-06-27 | End: 2021-07-07

## 2021-06-27 RX ORDER — OXYCODONE HYDROCHLORIDE 5 MG/1
10 TABLET ORAL
Status: DISCONTINUED | OUTPATIENT
Start: 2021-06-27 | End: 2021-07-07

## 2021-06-27 RX ADMIN — SODIUM CHLORIDE 10 ML: 9 INJECTION, SOLUTION INTRAMUSCULAR; INTRAVENOUS; SUBCUTANEOUS at 22:06

## 2021-06-27 RX ADMIN — OXYCODONE 10 MG: 5 TABLET ORAL at 10:46

## 2021-06-27 RX ADMIN — HYDROMORPHONE HYDROCHLORIDE 1 MG: 2 INJECTION, SOLUTION INTRAMUSCULAR; INTRAVENOUS; SUBCUTANEOUS at 02:54

## 2021-06-27 RX ADMIN — GABAPENTIN 100 MG: 100 CAPSULE ORAL at 21:54

## 2021-06-27 RX ADMIN — LEVOTHYROXINE SODIUM 125 MCG: 0.12 TABLET ORAL at 05:23

## 2021-06-27 RX ADMIN — ACETAMINOPHEN 650 MG: 325 TABLET ORAL at 08:36

## 2021-06-27 RX ADMIN — MEROPENEM 500 MG: 500 INJECTION, POWDER, FOR SOLUTION INTRAVENOUS at 19:05

## 2021-06-27 RX ADMIN — LINEZOLID 600 MG: 600 INJECTION, SOLUTION INTRAVENOUS at 10:47

## 2021-06-27 RX ADMIN — CALCIUM CARBONATE (ANTACID) CHEW TAB 500 MG 400 MG: 500 CHEW TAB at 14:28

## 2021-06-27 RX ADMIN — HYDROMORPHONE HYDROCHLORIDE 1 MG: 2 INJECTION, SOLUTION INTRAMUSCULAR; INTRAVENOUS; SUBCUTANEOUS at 14:25

## 2021-06-27 RX ADMIN — HYDROMORPHONE HYDROCHLORIDE 1 MG: 2 INJECTION, SOLUTION INTRAMUSCULAR; INTRAVENOUS; SUBCUTANEOUS at 06:55

## 2021-06-27 RX ADMIN — SODIUM CHLORIDE 10 ML: 9 INJECTION, SOLUTION INTRAMUSCULAR; INTRAVENOUS; SUBCUTANEOUS at 05:23

## 2021-06-27 RX ADMIN — ALLOPURINOL 100 MG: 100 TABLET ORAL at 08:36

## 2021-06-27 RX ADMIN — OXYCODONE HYDROCHLORIDE 5 MG: 5 TABLET ORAL at 01:25

## 2021-06-27 RX ADMIN — ENOXAPARIN SODIUM 40 MG: 40 INJECTION SUBCUTANEOUS at 08:36

## 2021-06-27 RX ADMIN — OXYCODONE 10 MG: 5 TABLET ORAL at 20:02

## 2021-06-27 RX ADMIN — OXYCODONE 10 MG: 5 TABLET ORAL at 16:07

## 2021-06-27 RX ADMIN — GABAPENTIN 100 MG: 100 CAPSULE ORAL at 08:35

## 2021-06-27 RX ADMIN — HYDROMORPHONE HYDROCHLORIDE 1 MG: 2 INJECTION, SOLUTION INTRAMUSCULAR; INTRAVENOUS; SUBCUTANEOUS at 22:01

## 2021-06-27 RX ADMIN — SODIUM CHLORIDE 10 ML: 9 INJECTION, SOLUTION INTRAMUSCULAR; INTRAVENOUS; SUBCUTANEOUS at 14:00

## 2021-06-27 RX ADMIN — MEROPENEM 500 MG: 500 INJECTION, POWDER, FOR SOLUTION INTRAVENOUS at 05:23

## 2021-06-27 RX ADMIN — FAMOTIDINE 20 MG: 20 TABLET ORAL at 19:05

## 2021-06-27 RX ADMIN — TAMSULOSIN HYDROCHLORIDE 0.4 MG: 0.4 CAPSULE ORAL at 08:35

## 2021-06-27 RX ADMIN — Medication 1 CAPSULE: at 08:35

## 2021-06-27 RX ADMIN — OXYCODONE HYDROCHLORIDE 5 MG: 5 TABLET ORAL at 05:22

## 2021-06-27 RX ADMIN — FAMOTIDINE 20 MG: 20 TABLET ORAL at 08:35

## 2021-06-27 RX ADMIN — MEROPENEM 500 MG: 500 INJECTION, POWDER, FOR SOLUTION INTRAVENOUS at 12:06

## 2021-06-27 RX ADMIN — GABAPENTIN 100 MG: 100 CAPSULE ORAL at 16:05

## 2021-06-27 RX ADMIN — ASPIRIN 81 MG: 81 TABLET, CHEWABLE ORAL at 08:35

## 2021-06-27 RX ADMIN — LINEZOLID 600 MG: 600 INJECTION, SOLUTION INTRAVENOUS at 21:54

## 2021-06-27 RX ADMIN — METHADONE HYDROCHLORIDE 140 MG: 10 CONCENTRATE ORAL at 08:35

## 2021-06-27 RX ADMIN — DOCUSATE SODIUM 50MG AND SENNOSIDES 8.6MG 1 TABLET: 8.6; 5 TABLET, FILM COATED ORAL at 08:35

## 2021-06-27 NOTE — PROGRESS NOTES
Attempted to call rheumatology consult. Numbers in [facility] do not work. Dr. Spangler Deems aware.

## 2021-06-27 NOTE — PROGRESS NOTES
Problem: Risk for Spread of Infection  Goal: Prevent transmission of infectious organism to others  Description: Prevent the transmission of infectious organisms to other patients, staff members, and visitors. Outcome: Progressing Towards Goal     Problem: Patient Education:  Go to Education Activity  Goal: Patient/Family Education  Outcome: Progressing Towards Goal     Problem: Falls - Risk of  Goal: *Absence of Falls  Description: Document Margie Lord Fall Risk and appropriate interventions in the flowsheet.   Outcome: Progressing Towards Goal  Note: Fall Risk Interventions:  Mobility Interventions: Bed/chair exit alarm         Medication Interventions: Bed/chair exit alarm    Elimination Interventions: Bed/chair exit alarm    History of Falls Interventions: Bed/chair exit alarm         Problem: Patient Education: Go to Patient Education Activity  Goal: Patient/Family Education  Outcome: Progressing Towards Goal

## 2021-06-27 NOTE — PROGRESS NOTES
End of Shift Note    Bedside shift change report given to CCU RN (oncoming nurse) by Zan Melendez (offgoing nurse). Report included the following information SBAR, Kardex and ED Summary    Shift worked:  4539-3666     Shift summary and any significant changes:     Oxycodone medication increased to 10mg PO. Continue abx. No significant changes. [facility] THIS RN      Concerns for physician to address:       Zone phone for oncoming shift:          Activity:  Activity Level: Up with Assistance  Number times ambulated in hallways past shift: 0  Number of times OOB to chair past shift: 0    Cardiac:   Cardiac Monitoring: Yes      Cardiac Rhythm: Sinus Rhythm    Access:   Current line(s): PIV     Genitourinary:   Urinary status: voiding    Respiratory:   O2 Device: None (Room air)  Chronic home O2 use?: NO  Incentive spirometer at bedside: YES     GI:     Current diet:  ADULT DIET Regular  Passing flatus: YES  Tolerating current diet: YES       Pain Management:   Patient states pain is manageable on current regimen: YES    Skin:  Dejuan Score: 16  Interventions: increase time out of bed    Patient Safety:  Fall Score:  Total Score: 4  Interventions: assistive device (walker, cane, etc)  High Fall Risk: Yes    Length of Stay:  Expected LOS: - - -  Actual LOS: 2      Zan Melendez

## 2021-06-28 LAB
ANION GAP SERPL CALC-SCNC: 5 MMOL/L (ref 5–15)
BASOPHILS # BLD: 0 K/UL (ref 0–0.1)
BASOPHILS NFR BLD: 0 % (ref 0–1)
BUN SERPL-MCNC: 5 MG/DL (ref 6–20)
BUN/CREAT SERPL: 7 (ref 12–20)
CALCIUM SERPL-MCNC: 8.4 MG/DL (ref 8.5–10.1)
CHLORIDE SERPL-SCNC: 100 MMOL/L (ref 97–108)
CO2 SERPL-SCNC: 26 MMOL/L (ref 21–32)
CREAT SERPL-MCNC: 0.7 MG/DL (ref 0.7–1.3)
DIFFERENTIAL METHOD BLD: ABNORMAL
EOSINOPHIL # BLD: 0.5 K/UL (ref 0–0.4)
EOSINOPHIL NFR BLD: 4 % (ref 0–7)
ERYTHROCYTE [DISTWIDTH] IN BLOOD BY AUTOMATED COUNT: 15.9 % (ref 11.5–14.5)
GLUCOSE SERPL-MCNC: 79 MG/DL (ref 65–100)
HCT VFR BLD AUTO: 32.3 % (ref 36.6–50.3)
HGB BLD-MCNC: 10.7 G/DL (ref 12.1–17)
IMM GRANULOCYTES # BLD AUTO: 0 K/UL (ref 0–0.04)
IMM GRANULOCYTES NFR BLD AUTO: 0 % (ref 0–0.5)
LYMPHOCYTES # BLD: 2.2 K/UL (ref 0.8–3.5)
LYMPHOCYTES NFR BLD: 18 % (ref 12–49)
MCH RBC QN AUTO: 29.2 PG (ref 26–34)
MCHC RBC AUTO-ENTMCNC: 33.1 G/DL (ref 30–36.5)
MCV RBC AUTO: 88 FL (ref 80–99)
METAMYELOCYTES NFR BLD MANUAL: 1 %
MONOCYTES # BLD: 1 K/UL (ref 0–1)
MONOCYTES NFR BLD: 8 % (ref 5–13)
MYELOCYTES NFR BLD MANUAL: 1 %
NEUTS SEG # BLD: 8.2 K/UL (ref 1.8–8)
NEUTS SEG NFR BLD: 68 % (ref 32–75)
NRBC # BLD: 0 K/UL (ref 0–0.01)
NRBC BLD-RTO: 0 PER 100 WBC
PLATELET # BLD AUTO: 455 K/UL (ref 150–400)
PMV BLD AUTO: 8.6 FL (ref 8.9–12.9)
POTASSIUM SERPL-SCNC: 4.2 MMOL/L (ref 3.5–5.1)
RBC # BLD AUTO: 3.67 M/UL (ref 4.1–5.7)
RBC MORPH BLD: ABNORMAL
SODIUM SERPL-SCNC: 131 MMOL/L (ref 136–145)
WBC # BLD AUTO: 12.1 K/UL (ref 4.1–11.1)

## 2021-06-28 PROCEDURE — 74011250636 HC RX REV CODE- 250/636: Performed by: EMERGENCY MEDICINE

## 2021-06-28 PROCEDURE — 74011250637 HC RX REV CODE- 250/637: Performed by: HOSPITALIST

## 2021-06-28 PROCEDURE — 2709999900 HC NON-CHARGEABLE SUPPLY

## 2021-06-28 PROCEDURE — 74011250636 HC RX REV CODE- 250/636: Performed by: HOSPITALIST

## 2021-06-28 PROCEDURE — 74011000258 HC RX REV CODE- 258: Performed by: EMERGENCY MEDICINE

## 2021-06-28 PROCEDURE — 77030041076 HC DRSG AG OPTICELL MDII -A

## 2021-06-28 PROCEDURE — 85025 COMPLETE CBC W/AUTO DIFF WBC: CPT

## 2021-06-28 PROCEDURE — 99252 IP/OBS CONSLTJ NEW/EST SF 35: CPT | Performed by: STUDENT IN AN ORGANIZED HEALTH CARE EDUCATION/TRAINING PROGRAM

## 2021-06-28 PROCEDURE — 65270000029 HC RM PRIVATE

## 2021-06-28 PROCEDURE — 36415 COLL VENOUS BLD VENIPUNCTURE: CPT

## 2021-06-28 PROCEDURE — 74011250637 HC RX REV CODE- 250/637: Performed by: STUDENT IN AN ORGANIZED HEALTH CARE EDUCATION/TRAINING PROGRAM

## 2021-06-28 PROCEDURE — 80048 BASIC METABOLIC PNL TOTAL CA: CPT

## 2021-06-28 RX ADMIN — SODIUM CHLORIDE 10 ML: 9 INJECTION, SOLUTION INTRAMUSCULAR; INTRAVENOUS; SUBCUTANEOUS at 21:01

## 2021-06-28 RX ADMIN — DOCUSATE SODIUM 50MG AND SENNOSIDES 8.6MG 1 TABLET: 8.6; 5 TABLET, FILM COATED ORAL at 09:14

## 2021-06-28 RX ADMIN — FAMOTIDINE 20 MG: 20 TABLET ORAL at 17:45

## 2021-06-28 RX ADMIN — SODIUM CHLORIDE 10 ML: 9 INJECTION, SOLUTION INTRAMUSCULAR; INTRAVENOUS; SUBCUTANEOUS at 05:47

## 2021-06-28 RX ADMIN — OXYCODONE 10 MG: 5 TABLET ORAL at 09:14

## 2021-06-28 RX ADMIN — OXYCODONE 10 MG: 5 TABLET ORAL at 15:25

## 2021-06-28 RX ADMIN — SODIUM CHLORIDE 10 ML: 9 INJECTION, SOLUTION INTRAMUSCULAR; INTRAVENOUS; SUBCUTANEOUS at 14:00

## 2021-06-28 RX ADMIN — HYDROMORPHONE HYDROCHLORIDE 1 MG: 2 INJECTION, SOLUTION INTRAMUSCULAR; INTRAVENOUS; SUBCUTANEOUS at 17:56

## 2021-06-28 RX ADMIN — LINEZOLID 600 MG: 600 INJECTION, SOLUTION INTRAVENOUS at 09:28

## 2021-06-28 RX ADMIN — MEROPENEM 500 MG: 500 INJECTION, POWDER, FOR SOLUTION INTRAVENOUS at 05:47

## 2021-06-28 RX ADMIN — ALLOPURINOL 100 MG: 100 TABLET ORAL at 09:14

## 2021-06-28 RX ADMIN — OXYCODONE 10 MG: 5 TABLET ORAL at 19:59

## 2021-06-28 RX ADMIN — LINEZOLID 600 MG: 600 INJECTION, SOLUTION INTRAVENOUS at 21:02

## 2021-06-28 RX ADMIN — METHADONE HYDROCHLORIDE 140 MG: 10 CONCENTRATE ORAL at 09:15

## 2021-06-28 RX ADMIN — ENOXAPARIN SODIUM 40 MG: 40 INJECTION SUBCUTANEOUS at 09:14

## 2021-06-28 RX ADMIN — HYDROMORPHONE HYDROCHLORIDE 1 MG: 2 INJECTION, SOLUTION INTRAMUSCULAR; INTRAVENOUS; SUBCUTANEOUS at 21:54

## 2021-06-28 RX ADMIN — FAMOTIDINE 20 MG: 20 TABLET ORAL at 09:14

## 2021-06-28 RX ADMIN — GABAPENTIN 100 MG: 100 CAPSULE ORAL at 17:45

## 2021-06-28 RX ADMIN — TAMSULOSIN HYDROCHLORIDE 0.4 MG: 0.4 CAPSULE ORAL at 09:14

## 2021-06-28 RX ADMIN — HYDROMORPHONE HYDROCHLORIDE 1 MG: 2 INJECTION, SOLUTION INTRAMUSCULAR; INTRAVENOUS; SUBCUTANEOUS at 01:56

## 2021-06-28 RX ADMIN — OXYCODONE 10 MG: 5 TABLET ORAL at 00:01

## 2021-06-28 RX ADMIN — MEROPENEM 500 MG: 500 INJECTION, POWDER, FOR SOLUTION INTRAVENOUS at 17:45

## 2021-06-28 RX ADMIN — GABAPENTIN 100 MG: 100 CAPSULE ORAL at 21:01

## 2021-06-28 RX ADMIN — MEROPENEM 500 MG: 500 INJECTION, POWDER, FOR SOLUTION INTRAVENOUS at 13:28

## 2021-06-28 RX ADMIN — HYDROMORPHONE HYDROCHLORIDE 1 MG: 2 INJECTION, SOLUTION INTRAMUSCULAR; INTRAVENOUS; SUBCUTANEOUS at 12:30

## 2021-06-28 RX ADMIN — MEROPENEM 500 MG: 500 INJECTION, POWDER, FOR SOLUTION INTRAVENOUS at 23:00

## 2021-06-28 RX ADMIN — GABAPENTIN 100 MG: 100 CAPSULE ORAL at 09:14

## 2021-06-28 RX ADMIN — LEVOTHYROXINE SODIUM 125 MCG: 0.12 TABLET ORAL at 05:55

## 2021-06-28 RX ADMIN — CALCIUM CARBONATE (ANTACID) CHEW TAB 500 MG 400 MG: 500 CHEW TAB at 09:28

## 2021-06-28 RX ADMIN — MEROPENEM 500 MG: 500 INJECTION, POWDER, FOR SOLUTION INTRAVENOUS at 00:01

## 2021-06-28 RX ADMIN — Medication 1 CAPSULE: at 09:14

## 2021-06-28 RX ADMIN — OXYCODONE 10 MG: 5 TABLET ORAL at 03:58

## 2021-06-28 RX ADMIN — HYDROMORPHONE HYDROCHLORIDE 1 MG: 2 INJECTION, SOLUTION INTRAMUSCULAR; INTRAVENOUS; SUBCUTANEOUS at 05:47

## 2021-06-28 RX ADMIN — ASPIRIN 81 MG: 81 TABLET, CHEWABLE ORAL at 09:14

## 2021-06-28 NOTE — PROGRESS NOTES
Hospitalist Progress Note    NAME: Annel Parish   :  1965   MRN:  [id number]       Assessment / Plan:    Severe sepsis POA  Left lower leg and foot infection with tissue necrosis in setting of chronic left lower leg ulcerated wound from trauma 2 years ago and pyoderma gangrenosum   · Lactic 2.4, heart rate 134, white count 22,000 on admission   · started on zyvox + merrem based on previous wound cx results. Day #4. Will consider narrowing abx. BCx NGTD  · continue zyvox and merrem as wound cx  with ESBL Coli, pseudomonas, enterofaecalis. Follow Bcx, NGTD  · Cont' wound care  · IV dilaudid and oxycodone prn with prn narcan. Continue methadone 140mg daily for chronic pain. Continue neurontin. Monitor respiratory status. Add bowel regimen  · s/p NS boluses and continuous IVF. · Discussed at length with patient about treatment options. Patient has refused amputation in the past including on most recent admission last month. He states that he is too scared of losing his leg. Reports that he has lost a lot of things in life including his sister at a young age and mother. Now he is wheelchair bound due to his leg wound. The wound has been there for about 2 years and he has been dealing with infection and significant pain affecting his quality of life. · Patient still unsure weather or not he would want surgery    · reviewed recent OP biopsy result, consistent with pyoderma gangrenosum. He was started on mycophenolate by his dermatologist about a month ago which is now on hold due to infection. Unclear if he as been treated with steroids in the past. Consulted inpatient rheumatology to explore medical treatment options for PG. Rec OP follow up  · ID consult for duration/choice of antibiotics. GI upset from recent nsaid use  GERD  --pepcid bid     HTN  --continue lisinopril     Gout flare, left hand  --continue allopurinol.  S/p three doses of colchicine     Hypothyroid  --continue levothyroxine    Hx bladder cancer  Hx stroke from aneurysm with left sided weakness  --continue aspirin    Chronic methadone use: Continue home dose methadone   Obesity: BMI 31. Counseling about lifestyle modification provided        Body mass index is 31.66 kg/m². Estimated discharge date: 06/29  Barriers:    Code status: Full  Prophylaxis: Lovenox  Recommended Disposition: TBD     Subjective:     Chief Complaint / Reason for Physician Visit   Discussed with RN events overnight. C/o leg pain, awaiting his next dose of dilaudid    Review of Systems:  Symptom Y/N Comments  Symptom Y/N Comments   Fever/Chills    Chest Pain     Poor Appetite    Edema     Cough    Abdominal Pain     Sputum    Joint Pain     SOB/BECK    Pruritis/Rash     Nausea/vomit    Tolerating PT/OT     Diarrhea    Tolerating Diet     Constipation    Other       Could NOT obtain due to:      Objective:     VITALS:   Last 24hrs VS reviewed since prior progress note. Most recent are:  Patient Vitals for the past 24 hrs:   Temp Pulse Resp BP SpO2   06/28/21 0750 97.5 °F (36.4 °C) 63 18 105/71 98 %   06/28/21 0402 97.5 °F (36.4 °C) 68 18 (!) 113/96 98 %   06/27/21 2250 97.8 °F (36.6 °C) 65 18 108/69 97 %   06/27/21 1954 97.5 °F (36.4 °C) 60 16 107/71 98 %   06/27/21 1601 97.5 °F (36.4 °C) 84 18 111/68 94 %   06/27/21 1433 97.5 °F (36.4 °C) 81 18 121/78 95 %   06/27/21 1037 97.2 °F (36.2 °C) 60 18 (!) 138/94 92 %       Intake/Output Summary (Last 24 hours) at 6/28/2021 3580  Last data filed at 6/28/2021 7694  Gross per 24 hour   Intake 2520 ml   Output 2300 ml   Net 220 ml        I had a face to face encounter and independently examined this patient on 6/28/2021, as outlined below:  PHYSICAL EXAM:  General: WD, WN. Alert, cooperative, no acute distress    EENT:  EOMI. Anicteric sclerae. MMM  Resp:  CTA bilaterally, no wheezing or rales. No accessory muscle use  CV:  Regular  rhythm,  No edema  GI:  Soft, Non distended, Non tender.   +Bowel sounds  Neurologic:  Alert and oriented X 3, normal speech,   Psych:   Good insight. Not anxious nor agitated  Skin:  Left leg: Large deep circumferential ulcer involving almost all skin below the mid sheen to the forefoot, yellowish fibrinous necrotic base with foul smelling odor, rolled out ulcer edge with minimal surrounding erythema      Reviewed most current lab test results and cultures  YES  Reviewed most current radiology test results   YES  Review and summation of old records today    NO  Reviewed patient's current orders and MAR    YES  PMH/SH reviewed - no change compared to H&P  ________________________________________________________________________  Care Plan discussed with:    Comments   Patient x    Family      RN x    Care Manager     Consultant                        Multidiciplinary team rounds were held today with , nursing, pharmacist and clinical coordinator. Patient's plan of care was discussed; medications were reviewed and discharge planning was addressed. ________________________________________________________________________  Total NON critical care TIME:  40   Minutes    Total CRITICAL CARE TIME Spent:   Minutes non procedure based      Comments   >50% of visit spent in counseling and coordination of care x    ________________________________________________________________________  Luis Peguero MD     Procedures: see electronic medical records for all procedures/Xrays and details which were not copied into this note but were reviewed prior to creation of Plan. LABS:  I reviewed today's most current labs and imaging studies.   Pertinent labs include:  Recent Labs     06/28/21  0212 06/27/21  0130 06/26/21  0358   WBC 12.1* 11.3* 13.5*   HGB 10.7* 9.9* 10.5*   HCT 32.3* 29.5* 31.7*   * 394 424*     Recent Labs     06/28/21  0212 06/27/21  0130 06/26/21  0358 06/25/21  1204 06/25/21  1204   * 130* 130*   < > 129*   K 4.2 4.1 3.9   < > 4.3   CL 100 102 101   < > 95*   CO2 26 22 21   < > 28   GLU 79 102* 125*   < > 113*   BUN 5* 6 10   < > 12   CREA 0.70 0.43* 0.57*   < > 0.95   CA 8.4* 7.9* 7.8*   < > 8.9   ALB  --   --   --   --  2.9*   TBILI  --   --   --   --  0.5   ALT  --   --   --   --  14    < > = values in this interval not displayed.        Signed: Elda Cevallos MD

## 2021-06-28 NOTE — WOUND CARE
Wound Care consult for the left lower leg / foot wounds that are very chronic and inflammatory. He has been told that this is caused by Pyoderma gangrenosum but there is no definitive evidence of this according to all of the tests done here at [facility] (last surgical path was 4/25/19. Since then he has also been seen at a  Hospital in [facility] [place] [facility]"" by a \"specialist\". Still non-specific. If this is pyoderma it is usually treated with long term systemic steroids and local topical moist wound care. Any debridement is usually autolytic and removing the loose slough as the moisture loosens the slough. Assessment today: Patient has edema in the leg foot and lower leg with painful inflammation and devitalized tissue in the wound bed. About 1/2 cup of the slough was easily removed today because it was loose and wet. There is exposed tendons x 2. WOUND POA CONDITIONS    Wound Leg lower Left; Lower; Anterior (Active)   Wound Images: left foot with edema and devitalized slough tissue in the wound bed. Also with pink granulation tissue. Left lateral lower leg with eschar and slough and pink granulation tissue: A lot of the slough was removed today.                            Left medial lower leg x 2 photos:       06/28/21 1351   Wound Etiology Other (Comment) 06/28/21 1351   Dressing Status New dressing applied 06/28/21 1351   Cleansed Cleansed with saline 06/28/21 1351   Dressing/Treatment Silver dressing;Xeroform;ABD pad;Gauze dressing/dressing sponge 06/28/21 1351   Dressing Change Due 06/29/21 06/28/21 1351   Wound Assessment Bleeding;Devitalized tissue;Erythema;Eschar moist;Pale granulation tissue;Pink/red;Slough 06/28/21 1351   Drainage Amount Moderate 06/28/21 1351   Drainage Description Serous 06/28/21 1351   Wound Odor None 06/28/21 1351   Jocelin-Wound/Incision Assessment Maceration;Denuded 06/28/21 1351   Edges Epibole (rolled edges) 06/28/21 1351   Wound Thickness Description Full thickness 06/28/21 1351     Treatment:    Lidocaine x 4 tubes was applied for 5 minutes prior to the wound care - applied with gloved hand to cover the wound. Removed a lot  of the slough and all of the brown slough in the posterior lower leg wounds. Cleansed with Brody Paganini and with Normal saline and wiped the wound bed with gauze to remove the old drainage and wound debris. Applied the silver dressings to the pink wound beds today because that was the end of the supply today. The rest of the wound was covered with Xeroform gauze and then covered with High Drainage wound packs to absorb the drainage. Wrapped with radha. Heel floated with pillow. Plan: Continue moist wound care with Opticel Ag and absorbent dressings as ordered. The entire wound will be covered with the Opticel Ag daily to manage the drainage and odor.    Marcelino Castro RN, BSN, [employer]

## 2021-06-28 NOTE — INTERDISCIPLINARY ROUNDS
Interdisciplinary team rounds were held 6/28/2021 with the following team members:Care Management, Nursing and Physician and the patient. Plan of care discussed. See clinical pathway and/or care plan for interventions and desired outcomes. 11:06 AM pt transfer to surgical bed (ortho or gensurg) Est dc date 6/30. Poss transfer to [address] [place] [address] for long term abx.

## 2021-06-28 NOTE — PROGRESS NOTES
Cm made attempt to completed CM initial assessment was unsuccessful. CM also left a Vm to pt's cg Jazlyn Louise at [phone] and requested a return call.     Myranda HENNING [facility]

## 2021-06-28 NOTE — PROGRESS NOTES
End of Shift Note    Bedside shift change report given to [address] (oncoming nurse) by Lis Byrne (offgoing nurse). Report included the following information SBAR, Kardex and ED Summary    Shift worked:  2337-1529     Shift summary and any significant changes:     Wound care assessed patient. Wound care daily. Continue abx. Concerns for physician to address:       Zone phone for oncoming shift:          Activity:  Activity Level: Up with Assistance  Number times ambulated in hallways past shift: 0  Number of times OOB to chair past shift: 4    Cardiac:   Cardiac Monitoring: Yes      Cardiac Rhythm: Sinus Rhythm    Access:   Current line(s): PIV     Genitourinary:   Urinary status: voiding    Respiratory:   O2 Device: None (Room air)  Chronic home O2 use?: NO  Incentive spirometer at bedside: NO     GI:     Current diet:  ADULT DIET Regular  Passing flatus: YES  Tolerating current diet: YES       Pain Management:   Patient states pain is manageable on current regimen: YES    Skin:  Dejuan Score: 15  Interventions: increase time out of bed    Patient Safety:  Fall Score:  Total Score: 4  Interventions: assistive device (walker, cane, etc)  High Fall Risk: Yes    Length of Stay:  Expected LOS: - - -  Actual LOS: 3      Lis Byrne

## 2021-06-28 NOTE — CONSULTS
Infectious Disease Consult Note    Reason for Consult: Superinfection of PG wound  Date of Consultation: June 28, 2021  Date of Admission: 6/25/2021  Referring Physician: Dr. Singleton       HPI:  Leif Hong is a 63y.o. year old male with history of  Diabetes, prior CVA, seizures, prior DVT, GERD, bladder cancer. Patient reports that he has had a chronic lower leg wound that started about 2 years ago when an HVAC unit fell on his leg. The wound has been non-healing. He has been seen by multiple specialists for it's care. Pathology on 4/25/19 at [facility] showed overall non-specific findings but those that could be seen in Pyoderma gangrenosum. Patient has a dermatologist Dr. Katelyn Griffith    chronic nonhealing. Patient says he has been getting steroid injections into the wounds on about a monthly basis and this had somewhat started to help the wounds a little. He presented on 6/25/21 for severe and worsening pain of the wounds. In the ED patient afebrile and HDS. But had leukocytosis of 74.5, neutrophilic. Plan films of left leg showed \"Severe diffuse osteopenia. Diffuse soft tissue swelling and skin contour irregularity. No destructive bone process demonstrated. \"  Blood cultures were taken and those are negative. Patient was started on linezolid and meropenem (see below). He has been admitted here for this in 04/2021 as well. He was seen by ID. Wound specimen cultures were polymicrobial - E.faecalis, ESBL E.coli, Pseudomonas aeruginosa, Alcaligenes faecalis. Patient was recommended to received 2 weeks of meropenem and linezolid (patient has allergies of bactrim, sulfa drugs, ciprofloxacin, vancomycin, zosyn). Patient does not smoke, and occasional alcohol. He denies any known hx of autoimmune disease or other cancers besides bladder cancer which he says has been treated. He reports prior admissions for DKA. His A1c was 5.7 in 04/2021. He lives by himself.        Past Medical History:  Past Medical History: Diagnosis Date    Aneurysm [facility])     (with stroke) brain    Bladder tumor     Cancer ([facility] [address]     bladder CA    Chronic pain     Family history of bladder cancer     GERD (gastroesophageal reflux disease)     Gout     History of vascular access device 04/25/2019    [facility] VAT 4 FR MIDLINE R Cephalic Limited access    History of vascular access device 04/26/2019    4 fr Midline right Cephalic midline access    Hypercholesterolemia     Hypertension     Lesion of bladder     Seizures ([facility] [address]     Stroke ([facility] [address] 2006    left sided weakness    Thromboembolus ([facility] [address]     left leg    Thyroid disease     TIA (transient ischemic attack) 2012         Surgical History:  Past Surgical History:   Procedure Laterality Date    HX ORTHOPAEDIC      R arthroscopy    HX OTHER SURGICAL      x2 L foot    HX UROLOGICAL  04/20/2018    Cystoscopy, TURBT (greater than 5 cm resected) Dr. Zayra Roche, [facility].  KNEE ARTHROSCP HARV      left knee    TRANSURETHRAL RESEC BLADDER NECK  08/10/2018         Family History:   Family History   Problem Relation Age of Onset    Diabetes Father     Lung Disease Father     Diabetes Sister     Diabetes Brother     Cancer Paternal Grandfather         Bladder         Social History:     See HPI      Allergies: Allergies   Allergen Reactions    Sulfamethoxazole-Trimethoprim Rash     L eye and L hand    Ciprofloxacin Hives     Per pcp records    Codeine Hives     Tolerates dilaudid, oxycodone    Cymbalta [Duloxetine] Other (comments)     Confusion and memory loss    Lyrica [Pregabalin] Hives    Sulfa (Sulfonamide Antibiotics) Rash    Ultram [Tramadol] Hives    Vancomycin Shortness of Breath    Zosyn [Piperacillin-Tazobactam] Rash         Review of Systems:     See HPI. Denied fevers or chills PTA. Other systems negative. Medications:  No current facility-administered medications on file prior to encounter.      Current Outpatient Medications on File Prior to Encounter   Medication Sig Dispense Refill    levothyroxine (SYNTHROID) 125 mcg tablet Take 125 mcg by mouth Daily (before breakfast).  mycophenolate (CELLCEPT) 500 mg tablet Take 500 mg by mouth two (2) times a day.  ibuprofen (MOTRIN) 200 mg tablet Take 400 mg by mouth every six (6) hours as needed for Pain.  colchicine 0.6 mg tablet Take 0.6 mg by mouth daily as needed for Gout or Pain. Indications: acute inflammation of the joints due to gout attack      tamsulosin (FLOMAX) 0.4 mg capsule Take 1 capsule by mouth daily after dinner  30 Cap 12    lidocaine (XYLOCAINE) 4 % topical cream Apply  to affected area as needed for Pain (apply to left lower leg wounds during dressing changes). 5 Tube 1    gabapentin (NEURONTIN) 100 mg capsule Take 100 mg by mouth three (3) times daily.  lisinopriL (PRINIVIL, ZESTRIL) 20 mg tablet Take 20 mg by mouth daily.  aspirin 81 mg chewable tablet Take 81 mg by mouth daily.  pantoprazole (PROTONIX) 40 mg tablet Take 40 mg by mouth daily as needed.  allopurinol (ZYLOPRIM) 100 mg tablet Take 1 Tab by mouth daily. Indications: treatment to prevent acute gout attack 30 Tab 0    senna-docusate (DARELL-COLACE) 8.6-50 mg per tablet Take 1 Tablet by mouth daily. Indications: constipation      methadone (DOLOPHINE) 10 mg/mL solution Take 140 mg by mouth daily. Indications: excessive pain      melatonin 3 mg tablet Take 1 Tab by mouth nightly as needed. 10 Tab 0    varenicline (Chantix Starting Month Box) 0.5 mg (11)- 1 mg (42) DsPk Take  by mouth.  Take one tablet by mouth in morning with food for 3 days then increase to 1 tablet by mouth twice daily with food thereafter as directed   Indications: stop smoking             Physical Exam:    Vitals:   Patient Vitals for the past 24 hrs:   Temp Pulse Resp BP SpO2   06/28/21 1439 97.4 °F (36.3 °C) 70 14 106/78 95 %   06/28/21 1037 97.4 °F (36.3 °C) 66 12 (!) 117/93 95 %   06/28/21 0750 97.5 °F (36.4 °C) 63 18 105/71 98 %   06/28/21 0402 97.5 °F (36.4 °C) 68 18 (!) 113/96 98 %   06/27/21 2250 97.8 °F (36.6 °C) 65 18 108/69 97 %   06/27/21 1954 97.5 °F (36.4 °C) 60 16 107/71 98 %   ·   · GEN: NAD  · HEENT: Normocephalic, atraumatic, PERRL, no scleral icterus  · CV: S1, S2 heard regularly, no murmurs, thrills or rubs heard   · Lungs: Clear to auscultation bilaterally  · Abdomen: soft, non distended, non tender  · Genitourinary: , no [place]  · Extremities: left lower leg wounds are in dressing just cleaned and dressed by wound care nurse. · Neuro: Alert, oriented to time, place and situation, moves all extremities to commands, verbal   · Skin: no rash  · Psych: good affect, good eye contact, non tearful   · Lines: PIVs       Labs:   Recent Results (from the past 24 hour(s))   CBC WITH AUTOMATED DIFF    Collection Time: 06/28/21  2:12 AM   Result Value Ref Range    WBC 12.1 (H) 4.1 - 11.1 K/uL    RBC 3.67 (L) 4.10 - 5.70 M/uL    HGB 10.7 (L) 12.1 - 17.0 g/dL    HCT 32.3 (L) 36.6 - 50.3 %    MCV 88.0 80.0 - 99.0 FL    MCH 29.2 26.0 - 34.0 PG    MCHC 33.1 30.0 - 36.5 g/dL    RDW 15.9 (H) 11.5 - 14.5 %    PLATELET 435 (H) 136 - 400 K/uL    MPV 8.6 (L) 8.9 - 12.9 FL    NRBC 0.0 0  WBC    ABSOLUTE NRBC 0.00 0.00 - 0.01 K/uL    NEUTROPHILS 68 32 - 75 %    LYMPHOCYTES 18 12 - 49 %    MONOCYTES 8 5 - 13 %    EOSINOPHILS 4 0 - 7 %    BASOPHILS 0 0 - 1 %    METAMYELOCYTES 1 %    MYELOCYTES 1 %    IMMATURE GRANULOCYTES 0 0.0 - 0.5 %    ABS. NEUTROPHILS 8.2 (H) 1.8 - 8.0 K/UL    ABS. LYMPHOCYTES 2.2 0.8 - 3.5 K/UL    ABS. MONOCYTES 1.0 0.0 - 1.0 K/UL    ABS. EOSINOPHILS 0.5 (H) 0.0 - 0.4 K/UL    ABS. BASOPHILS 0.0 0.0 - 0.1 K/UL    ABS. IMM.  GRANS. 0.0 0.00 - 0.04 K/UL    DF MANUAL      RBC COMMENTS NORMOCYTIC, NORMOCHROMIC     METABOLIC PANEL, BASIC    Collection Time: 06/28/21  2:12 AM   Result Value Ref Range    Sodium 131 (L) 136 - 145 mmol/L    Potassium 4.2 3.5 - 5.1 mmol/L    Chloride 100 97 - 108 mmol/L    CO2 26 21 - 32 mmol/L    Anion gap 5 5 - 15 mmol/L    Glucose 79 65 - 100 mg/dL    BUN 5 (L) 6 - 20 MG/DL    Creatinine 0.70 0.70 - 1.30 MG/DL    BUN/Creatinine ratio 7 (L) 12 - 20      GFR est AA >60 >60 ml/min/1.73m2    GFR est non-AA >60 >60 ml/min/1.73m2    Calcium 8.4 (L) 8.5 - 10.1 MG/DL       Microbiology Data:     See HPI        Assessment:   62 yo M with chronic non-healing left lower leg wound for about 2 years after injury from an HVAC unit. Biopsy pathology on 4/25/19 at [facility] showed overall non-specific findings but those that could be seen in Pyoderma gangrenosum. Patient has a dermatologist Dr. Blair Bound  Patient says he has been getting steroid injections into the wounds on about a monthly basis and this had somewhat started to help the wounds a little. He now presented for severe pain in the wounds. Afebrile but WBC 21.7 on arrival. Started on meropenem and linezolid based on most recent ID recommendations from April 2021. Assessed to have bacterial superinfection of the left leg wounds. Wounds under dressing by wound care team; I reviewed the pictures of the wound from 6/28/21 in chart. ID consulted for abx choice and duration of therapy. Recommendations:  - Reviewed wound care note. - Continue meropenem and linezolid for a total of 10 days, 6/25/21 to 7/5/21. The biopsy on 4/25/21 was \"non-specific with findings that could be seen in pyoderma gangrenosum\". He outpatient dermatologist is treating him for this diagnosis with intralesional steroids only. Unfortunately, the primary issue is the PG and patient is prone towards these infections and consistent optimum wound care is basic to reduce such complications and towards wound care itself. These infections are also making the primary skin condition worse. In addition, he is going to develop more and more resistant organisms down the line.    I will reach out to his dermatologist to inquire about details of treatment plan forward, and whether evaluation by an academic center is needed for the patient. - Given multiple antibiotic allergies, patient will benefit from allergy-immunology evaluation as well. Will follow. Thank for the opportunity to participate in the care of this patient. Please contact with questions or concerns.            Jean Marie Johnson MD  Infectious Diseases

## 2021-06-28 NOTE — PROGRESS NOTES
Problem: Risk for Spread of Infection  Goal: Prevent transmission of infectious organism to others  Description: Prevent the transmission of infectious organisms to other patients, staff members, and visitors. Outcome: Progressing Towards Goal     Problem: Patient Education:  Go to Education Activity  Goal: Patient/Family Education  Outcome: Progressing Towards Goal     Problem: Falls - Risk of  Goal: *Absence of Falls  Description: Document [place] Fall Risk and appropriate interventions in the flowsheet.   Outcome: Progressing Towards Goal  Note: Fall Risk Interventions:  Mobility Interventions: Bed/chair exit alarm, Patient to call before getting OOB, PT Consult for mobility concerns, PT Consult for assist device competence         Medication Interventions: Bed/chair exit alarm, Patient to call before getting OOB, Teach patient to arise slowly    Elimination Interventions: Bed/chair exit alarm, Call light in reach    History of Falls Interventions: Bed/chair exit alarm, Door open when patient unattended, Evaluate medications/consider consulting pharmacy         Problem: Patient Education: Go to Patient Education Activity  Goal: Patient/Family Education  Outcome: Progressing Towards Goal     Problem: Patient Education: Go to Patient Education Activity  Goal: Patient/Family Education  Outcome: Progressing Towards Goal     Problem: Sepsis: Day of Diagnosis  Goal: Off Pathway (Use only if patient is Off Pathway)  Outcome: Progressing Towards Goal  Goal: *Fluid resuscitation  Outcome: Progressing Towards Goal  Goal: *Paired blood cultures prior to first dose of antibiotic  Outcome: Progressing Towards Goal  Goal: *First dose of  appropriate antibiotic within 3 hours of arrival to ED, within 1 hour of arrival to ICU  Outcome: Progressing Towards Goal  Goal: *Lactic acid with first set of blood cultures  Outcome: Progressing Towards Goal  Goal: *Pneumococcal immunization (if eligible)  Outcome: Progressing Towards Goal  Goal: *Influenza immunization (if eligible)  Outcome: Progressing Towards Goal  Goal: Activity/Safety  Outcome: Progressing Towards Goal  Goal: Consults, if ordered  Outcome: Progressing Towards Goal  Goal: Diagnostic Test/Procedures  Outcome: Progressing Towards Goal  Goal: Nutrition/Diet  Outcome: Progressing Towards Goal  Goal: Discharge Planning  Outcome: Progressing Towards Goal  Goal: Medications  Outcome: Progressing Towards Goal  Goal: Respiratory  Outcome: Progressing Towards Goal  Goal: Treatments/Interventions/Procedures  Outcome: Progressing Towards Goal  Goal: Psychosocial  Outcome: Progressing Towards Goal     Problem: Sepsis: Day 2  Goal: Off Pathway (Use only if patient is Off Pathway)  Outcome: Progressing Towards Goal  Goal: *Central Venous Pressure maintained at 8-12 mm Hg  Outcome: Progressing Towards Goal  Goal: *Hemodynamically stable  Outcome: Progressing Towards Goal  Goal: *Tolerating diet  Outcome: Progressing Towards Goal  Goal: Activity/Safety  Outcome: Progressing Towards Goal  Goal: Consults, if ordered  Outcome: Progressing Towards Goal  Goal: Diagnostic Test/Procedures  Outcome: Progressing Towards Goal  Goal: Nutrition/Diet  Outcome: Progressing Towards Goal  Goal: Discharge Planning  Outcome: Progressing Towards Goal  Goal: Medications  Outcome: Progressing Towards Goal  Goal: Respiratory  Outcome: Progressing Towards Goal  Goal: Treatments/Interventions/Procedures  Outcome: Progressing Towards Goal  Goal: Psychosocial  Outcome: Progressing Towards Goal     Problem: Sepsis: Day 3  Goal: Off Pathway (Use only if patient is Off Pathway)  Outcome: Progressing Towards Goal  Goal: *Central Venous Pressure maintained at 8-12 mm Hg  Outcome: Progressing Towards Goal  Goal: *Oxygen saturation within defined limits  Outcome: Progressing Towards Goal  Goal: *Vital sign stability  Outcome: Progressing Towards Goal  Goal: *Tolerating diet  Outcome: Progressing Towards Goal  Goal: *Demonstrates progressive activity  Outcome: Progressing Towards Goal  Goal: Activity/Safety  Outcome: Progressing Towards Goal  Goal: Consults, if ordered  Outcome: Progressing Towards Goal  Goal: Diagnostic Test/Procedures  Outcome: Progressing Towards Goal  Goal: Nutrition/Diet  Outcome: Progressing Towards Goal  Goal: Discharge Planning  Outcome: Progressing Towards Goal  Goal: Medications  Outcome: Progressing Towards Goal  Goal: Respiratory  Outcome: Progressing Towards Goal  Goal: Treatments/Interventions/Procedures  Outcome: Progressing Towards Goal  Goal: Psychosocial  Outcome: Progressing Towards Goal     Problem: Sepsis: Day 4  Goal: Off Pathway (Use only if patient is Off Pathway)  Outcome: Progressing Towards Goal  Goal: Activity/Safety  Outcome: Progressing Towards Goal  Goal: Consults, if ordered  Outcome: Progressing Towards Goal  Goal: Diagnostic Test/Procedures  Outcome: Progressing Towards Goal  Goal: Nutrition/Diet  Outcome: Progressing Towards Goal  Goal: Discharge Planning  Outcome: Progressing Towards Goal  Goal: Medications  Outcome: Progressing Towards Goal  Goal: Respiratory  Outcome: Progressing Towards Goal  Goal: Treatments/Interventions/Procedures  Outcome: Progressing Towards Goal  Goal: Psychosocial  Outcome: Progressing Towards Goal  Goal: *Oxygen saturation within defined limits  Outcome: Progressing Towards Goal  Goal: *Hemodynamically stable  Outcome: Progressing Towards Goal  Goal: *Vital signs within defined limits  Outcome: Progressing Towards Goal  Goal: *Tolerating diet  Outcome: Progressing Towards Goal  Goal: *Demonstrates progressive activity  Outcome: Progressing Towards Goal  Goal: *Fluid volume maintenance  Outcome: Progressing Towards Goal     Problem: Sepsis: Day 5  Goal: Off Pathway (Use only if patient is Off Pathway)  Outcome: Progressing Towards Goal  Goal: *Oxygen saturation within defined limits  Outcome: Progressing Towards Goal  Goal: *Vital signs within defined limits  Outcome: Progressing Towards Goal  Goal: *Tolerating diet  Outcome: Progressing Towards Goal  Goal: *Demonstrates progressive activity  Outcome: Progressing Towards Goal  Goal: *Discharge plan identified  Outcome: Progressing Towards Goal  Goal: Activity/Safety  Outcome: Progressing Towards Goal  Goal: Consults, if ordered  Outcome: Progressing Towards Goal  Goal: Diagnostic Test/Procedures  Outcome: Progressing Towards Goal  Goal: Nutrition/Diet  Outcome: Progressing Towards Goal  Goal: Discharge Planning  Outcome: Progressing Towards Goal  Goal: Medications  Outcome: Progressing Towards Goal  Goal: Respiratory  Outcome: Progressing Towards Goal  Goal: Treatments/Interventions/Procedures  Outcome: Progressing Towards Goal  Goal: Psychosocial  Outcome: Progressing Towards Goal     Problem: Sepsis: Day 6  Goal: Off Pathway (Use only if patient is Off Pathway)  Outcome: Progressing Towards Goal  Goal: *Oxygen saturation within defined limits  Outcome: Progressing Towards Goal  Goal: *Vital signs within defined limits  Outcome: Progressing Towards Goal  Goal: *Tolerating diet  Outcome: Progressing Towards Goal  Goal: *Demonstrates progressive activity  Outcome: Progressing Towards Goal  Goal: Influenza immunization  Outcome: Progressing Towards Goal  Goal: *Pneumococcal immunization  Outcome: Progressing Towards Goal  Goal: Activity/Safety  Outcome: Progressing Towards Goal  Goal: Diagnostic Test/Procedures  Outcome: Progressing Towards Goal  Goal: Nutrition/Diet  Outcome: Progressing Towards Goal  Goal: Discharge Planning  Outcome: Progressing Towards Goal  Goal: Medications  Outcome: Progressing Towards Goal  Goal: Respiratory  Outcome: Progressing Towards Goal  Goal: Treatments/Interventions/Procedures  Outcome: Progressing Towards Goal  Goal: Psychosocial  Outcome: Progressing Towards Goal     Problem: Sepsis: Discharge Outcomes  Goal: *Vital signs within defined limits  Outcome: Progressing Towards Goal  Goal: *Tolerating diet  Outcome: Progressing Towards Goal  Goal: *Verbalizes understanding and describes prescribed diet  Outcome: Progressing Towards Goal  Goal: *Demonstrates progressive activity  Outcome: Progressing Towards Goal  Goal: *Describes follow-up/return visits to physicians  Outcome: Progressing Towards Goal  Goal: *Verbalizes name, dosage, time, side effects, and number of days to continue medications  Outcome: Progressing Towards Goal  Goal: *Influenza immunization (Oct-Mar only)  Outcome: Progressing Towards Goal  Goal: *Pneumococcal immunization  Outcome: Progressing Towards Goal  Goal: *Lungs clear or at baseline  Outcome: Progressing Towards Goal  Goal: *Oxygen saturation returns to baseline or 90% or better on room air  Outcome: Progressing Towards Goal  Goal: *Glycemic control  Outcome: Progressing Towards Goal  Goal: *Absence of deep venous thrombosis signs and symptoms(Stroke Metric)  Outcome: Progressing Towards Goal  Goal: *Describes available resources and support systems  Outcome: Progressing Towards Goal  Goal: *Optimal pain control at patient's stated goal  Outcome: Progressing Towards Goal

## 2021-06-28 NOTE — PROGRESS NOTES
Problem: Risk for Spread of Infection  Goal: Prevent transmission of infectious organism to others  Description: Prevent the transmission of infectious organisms to other patients, staff members, and visitors. Outcome: Progressing Towards Goal     Problem: Patient Education:  Go to Education Activity  Goal: Patient/Family Education  Outcome: Progressing Towards Goal     Problem: Falls - Risk of  Goal: *Absence of Falls  Description: Document [place] Fall Risk and appropriate interventions in the flowsheet.   Outcome: Progressing Towards Goal  Note: Fall Risk Interventions:  Mobility Interventions: Bed/chair exit alarm         Medication Interventions: Bed/chair exit alarm    Elimination Interventions: Bed/chair exit alarm    History of Falls Interventions: Bed/chair exit alarm         Problem: Patient Education: Go to Patient Education Activity  Goal: Patient/Family Education  Outcome: Progressing Towards Goal

## 2021-06-29 LAB
BASOPHILS # BLD: 0.1 K/UL (ref 0–0.1)
BASOPHILS NFR BLD: 1 % (ref 0–1)
DIFFERENTIAL METHOD BLD: ABNORMAL
EOSINOPHIL # BLD: 0.5 K/UL (ref 0–0.4)
EOSINOPHIL NFR BLD: 5 % (ref 0–7)
ERYTHROCYTE [DISTWIDTH] IN BLOOD BY AUTOMATED COUNT: 15.6 % (ref 11.5–14.5)
HCT VFR BLD AUTO: 30.7 % (ref 36.6–50.3)
HGB BLD-MCNC: 9.9 G/DL (ref 12.1–17)
IMM GRANULOCYTES # BLD AUTO: 0.2 K/UL (ref 0–0.04)
IMM GRANULOCYTES NFR BLD AUTO: 2 % (ref 0–0.5)
LYMPHOCYTES # BLD: 1.4 K/UL (ref 0.8–3.5)
LYMPHOCYTES NFR BLD: 13 % (ref 12–49)
MCH RBC QN AUTO: 28.5 PG (ref 26–34)
MCHC RBC AUTO-ENTMCNC: 32.2 G/DL (ref 30–36.5)
MCV RBC AUTO: 88.5 FL (ref 80–99)
MONOCYTES # BLD: 1.1 K/UL (ref 0–1)
MONOCYTES NFR BLD: 10 % (ref 5–13)
NEUTS SEG # BLD: 7.3 K/UL (ref 1.8–8)
NEUTS SEG NFR BLD: 69 % (ref 32–75)
NRBC # BLD: 0 K/UL (ref 0–0.01)
NRBC BLD-RTO: 0 PER 100 WBC
PLATELET # BLD AUTO: 388 K/UL (ref 150–400)
PMV BLD AUTO: 8.3 FL (ref 8.9–12.9)
RBC # BLD AUTO: 3.47 M/UL (ref 4.1–5.7)
WBC # BLD AUTO: 10.5 K/UL (ref 4.1–11.1)

## 2021-06-29 PROCEDURE — 74011000250 HC RX REV CODE- 250: Performed by: STUDENT IN AN ORGANIZED HEALTH CARE EDUCATION/TRAINING PROGRAM

## 2021-06-29 PROCEDURE — 36415 COLL VENOUS BLD VENIPUNCTURE: CPT

## 2021-06-29 PROCEDURE — 74011250637 HC RX REV CODE- 250/637: Performed by: STUDENT IN AN ORGANIZED HEALTH CARE EDUCATION/TRAINING PROGRAM

## 2021-06-29 PROCEDURE — 65270000029 HC RM PRIVATE

## 2021-06-29 PROCEDURE — 2709999900 HC NON-CHARGEABLE SUPPLY

## 2021-06-29 PROCEDURE — 74011250636 HC RX REV CODE- 250/636: Performed by: EMERGENCY MEDICINE

## 2021-06-29 PROCEDURE — 85025 COMPLETE CBC W/AUTO DIFF WBC: CPT

## 2021-06-29 PROCEDURE — 99233 SBSQ HOSP IP/OBS HIGH 50: CPT | Performed by: STUDENT IN AN ORGANIZED HEALTH CARE EDUCATION/TRAINING PROGRAM

## 2021-06-29 PROCEDURE — 74011000258 HC RX REV CODE- 258: Performed by: EMERGENCY MEDICINE

## 2021-06-29 PROCEDURE — 74011250637 HC RX REV CODE- 250/637: Performed by: HOSPITALIST

## 2021-06-29 PROCEDURE — 74011250636 HC RX REV CODE- 250/636: Performed by: HOSPITALIST

## 2021-06-29 RX ADMIN — TAMSULOSIN HYDROCHLORIDE 0.4 MG: 0.4 CAPSULE ORAL at 08:51

## 2021-06-29 RX ADMIN — LISINOPRIL 20 MG: 20 TABLET ORAL at 08:51

## 2021-06-29 RX ADMIN — ONDANSETRON 4 MG: 4 TABLET, ORALLY DISINTEGRATING ORAL at 12:56

## 2021-06-29 RX ADMIN — OXYCODONE 10 MG: 5 TABLET ORAL at 20:09

## 2021-06-29 RX ADMIN — GABAPENTIN 100 MG: 100 CAPSULE ORAL at 21:40

## 2021-06-29 RX ADMIN — MEROPENEM 500 MG: 500 INJECTION, POWDER, FOR SOLUTION INTRAVENOUS at 17:33

## 2021-06-29 RX ADMIN — OXYCODONE 10 MG: 5 TABLET ORAL at 00:09

## 2021-06-29 RX ADMIN — LINEZOLID 600 MG: 600 INJECTION, SOLUTION INTRAVENOUS at 10:27

## 2021-06-29 RX ADMIN — HYDROMORPHONE HYDROCHLORIDE 1 MG: 2 INJECTION, SOLUTION INTRAMUSCULAR; INTRAVENOUS; SUBCUTANEOUS at 23:08

## 2021-06-29 RX ADMIN — METHADONE HYDROCHLORIDE 140 MG: 10 CONCENTRATE ORAL at 08:52

## 2021-06-29 RX ADMIN — HYDROMORPHONE HYDROCHLORIDE 1 MG: 2 INJECTION, SOLUTION INTRAMUSCULAR; INTRAVENOUS; SUBCUTANEOUS at 13:41

## 2021-06-29 RX ADMIN — HYDROMORPHONE HYDROCHLORIDE 1 MG: 2 INJECTION, SOLUTION INTRAMUSCULAR; INTRAVENOUS; SUBCUTANEOUS at 18:55

## 2021-06-29 RX ADMIN — Medication 1 CAPSULE: at 08:51

## 2021-06-29 RX ADMIN — OXYCODONE 10 MG: 5 TABLET ORAL at 08:51

## 2021-06-29 RX ADMIN — SODIUM CHLORIDE 10 ML: 9 INJECTION, SOLUTION INTRAMUSCULAR; INTRAVENOUS; SUBCUTANEOUS at 05:10

## 2021-06-29 RX ADMIN — MEROPENEM 500 MG: 500 INJECTION, POWDER, FOR SOLUTION INTRAVENOUS at 05:10

## 2021-06-29 RX ADMIN — HYDROMORPHONE HYDROCHLORIDE 1 MG: 2 INJECTION, SOLUTION INTRAMUSCULAR; INTRAVENOUS; SUBCUTANEOUS at 05:50

## 2021-06-29 RX ADMIN — LINEZOLID 600 MG: 600 INJECTION, SOLUTION INTRAVENOUS at 21:49

## 2021-06-29 RX ADMIN — ALLOPURINOL 100 MG: 100 TABLET ORAL at 08:51

## 2021-06-29 RX ADMIN — LEVOTHYROXINE SODIUM 125 MCG: 0.12 TABLET ORAL at 05:12

## 2021-06-29 RX ADMIN — Medication 3 MG: at 00:12

## 2021-06-29 RX ADMIN — ONDANSETRON 4 MG: 2 INJECTION INTRAMUSCULAR; INTRAVENOUS at 05:50

## 2021-06-29 RX ADMIN — GABAPENTIN 100 MG: 100 CAPSULE ORAL at 08:51

## 2021-06-29 RX ADMIN — ASPIRIN 81 MG: 81 TABLET, CHEWABLE ORAL at 08:53

## 2021-06-29 RX ADMIN — DOCUSATE SODIUM 50MG AND SENNOSIDES 8.6MG 1 TABLET: 8.6; 5 TABLET, FILM COATED ORAL at 08:51

## 2021-06-29 RX ADMIN — GABAPENTIN 100 MG: 100 CAPSULE ORAL at 17:33

## 2021-06-29 RX ADMIN — SODIUM CHLORIDE 10 ML: 9 INJECTION, SOLUTION INTRAMUSCULAR; INTRAVENOUS; SUBCUTANEOUS at 22:33

## 2021-06-29 RX ADMIN — HYDROMORPHONE HYDROCHLORIDE 1 MG: 2 INJECTION, SOLUTION INTRAMUSCULAR; INTRAVENOUS; SUBCUTANEOUS at 01:56

## 2021-06-29 RX ADMIN — OXYCODONE 10 MG: 5 TABLET ORAL at 14:20

## 2021-06-29 RX ADMIN — OXYCODONE 10 MG: 5 TABLET ORAL at 04:35

## 2021-06-29 RX ADMIN — LIDOCAINE: 40 CREAM TOPICAL at 15:28

## 2021-06-29 RX ADMIN — MEROPENEM 500 MG: 500 INJECTION, POWDER, FOR SOLUTION INTRAVENOUS at 12:55

## 2021-06-29 RX ADMIN — FAMOTIDINE 20 MG: 20 TABLET ORAL at 08:52

## 2021-06-29 RX ADMIN — SODIUM CHLORIDE 10 ML: 9 INJECTION, SOLUTION INTRAMUSCULAR; INTRAVENOUS; SUBCUTANEOUS at 13:44

## 2021-06-29 RX ADMIN — FAMOTIDINE 20 MG: 20 TABLET ORAL at 17:33

## 2021-06-29 NOTE — INTERDISCIPLINARY ROUNDS
Interdisciplinary team rounds were held 6/29/2021 with the following team members:Care Management, Nursing and Pharmacy and the patient. Plan of care discussed. See clinical pathway and/or care plan for interventions and desired outcomes. 11:25 AM poss dc 7/1. Will need 10 days IV abx. Possible emtala to [facility] for derm. Cm initiating complex care for disposition.

## 2021-06-29 NOTE — PROGRESS NOTES
Problem: Risk for Spread of Infection  Goal: Prevent transmission of infectious organism to others  Description: Prevent the transmission of infectious organisms to other patients, staff members, and visitors. Outcome: Progressing Towards Goal     Problem: Patient Education:  Go to Education Activity  Goal: Patient/Family Education  Outcome: Progressing Towards Goal     Problem: Falls - Risk of  Goal: *Absence of Falls  Description: Document Elkin Gutierrez Fall Risk and appropriate interventions in the flowsheet.   Outcome: Progressing Towards Goal  Note: Fall Risk Interventions:  Mobility Interventions: Bed/chair exit alarm, Patient to call before getting OOB         Medication Interventions: Bed/chair exit alarm, Patient to call before getting OOB, Teach patient to arise slowly    Elimination Interventions: Bed/chair exit alarm, Call light in reach, Elevated toilet seat, Toileting schedule/hourly rounds, Urinal in reach    History of Falls Interventions: Bed/chair exit alarm         Problem: Patient Education: Go to Patient Education Activity  Goal: Patient/Family Education  Outcome: Progressing Towards Goal     Problem: Patient Education: Go to Patient Education Activity  Goal: Patient/Family Education  Outcome: Progressing Towards Goal     Problem: Sepsis: Day of Diagnosis  Goal: Off Pathway (Use only if patient is Off Pathway)  Outcome: Progressing Towards Goal  Goal: *Fluid resuscitation  Outcome: Progressing Towards Goal  Goal: *Paired blood cultures prior to first dose of antibiotic  Outcome: Progressing Towards Goal  Goal: *First dose of  appropriate antibiotic within 3 hours of arrival to ED, within 1 hour of arrival to ICU  Outcome: Progressing Towards Goal  Goal: *Lactic acid with first set of blood cultures  Outcome: Progressing Towards Goal  Goal: *Pneumococcal immunization (if eligible)  Outcome: Progressing Towards Goal  Goal: *Influenza immunization (if eligible)  Outcome: Progressing Towards Goal  Goal: Activity/Safety  Outcome: Progressing Towards Goal  Goal: Consults, if ordered  Outcome: Progressing Towards Goal  Goal: Diagnostic Test/Procedures  Outcome: Progressing Towards Goal  Goal: Nutrition/Diet  Outcome: Progressing Towards Goal  Goal: Discharge Planning  Outcome: Progressing Towards Goal  Goal: Medications  Outcome: Progressing Towards Goal  Goal: Respiratory  Outcome: Progressing Towards Goal  Goal: Treatments/Interventions/Procedures  Outcome: Progressing Towards Goal  Goal: Psychosocial  Outcome: Progressing Towards Goal     Problem: Sepsis: Day 2  Goal: Off Pathway (Use only if patient is Off Pathway)  Outcome: Progressing Towards Goal  Goal: *Central Venous Pressure maintained at 8-12 mm Hg  Outcome: Progressing Towards Goal  Goal: *Hemodynamically stable  Outcome: Progressing Towards Goal  Goal: *Tolerating diet  Outcome: Progressing Towards Goal  Goal: Activity/Safety  Outcome: Progressing Towards Goal  Goal: Consults, if ordered  Outcome: Progressing Towards Goal  Goal: Diagnostic Test/Procedures  Outcome: Progressing Towards Goal  Goal: Nutrition/Diet  Outcome: Progressing Towards Goal  Goal: Discharge Planning  Outcome: Progressing Towards Goal  Goal: Medications  Outcome: Progressing Towards Goal  Goal: Respiratory  Outcome: Progressing Towards Goal  Goal: Treatments/Interventions/Procedures  Outcome: Progressing Towards Goal  Goal: Psychosocial  Outcome: Progressing Towards Goal     Problem: Sepsis: Day 3  Goal: Off Pathway (Use only if patient is Off Pathway)  Outcome: Progressing Towards Goal  Goal: *Central Venous Pressure maintained at 8-12 mm Hg  Outcome: Progressing Towards Goal  Goal: *Oxygen saturation within defined limits  Outcome: Progressing Towards Goal  Goal: *Vital sign stability  Outcome: Progressing Towards Goal  Goal: *Tolerating diet  Outcome: Progressing Towards Goal  Goal: *Demonstrates progressive activity  Outcome: Progressing Towards Goal  Goal: Activity/Safety  Outcome: Progressing Towards Goal  Goal: Consults, if ordered  Outcome: Progressing Towards Goal  Goal: Diagnostic Test/Procedures  Outcome: Progressing Towards Goal  Goal: Nutrition/Diet  Outcome: Progressing Towards Goal  Goal: Discharge Planning  Outcome: Progressing Towards Goal  Goal: Medications  Outcome: Progressing Towards Goal  Goal: Respiratory  Outcome: Progressing Towards Goal  Goal: Treatments/Interventions/Procedures  Outcome: Progressing Towards Goal  Goal: Psychosocial  Outcome: Progressing Towards Goal     Problem: Sepsis: Day 4  Goal: Off Pathway (Use only if patient is Off Pathway)  Outcome: Progressing Towards Goal  Goal: Activity/Safety  Outcome: Progressing Towards Goal  Goal: Consults, if ordered  Outcome: Progressing Towards Goal  Goal: Diagnostic Test/Procedures  Outcome: Progressing Towards Goal  Goal: Nutrition/Diet  Outcome: Progressing Towards Goal  Goal: Discharge Planning  Outcome: Progressing Towards Goal  Goal: Medications  Outcome: Progressing Towards Goal  Goal: Respiratory  Outcome: Progressing Towards Goal  Goal: Treatments/Interventions/Procedures  Outcome: Progressing Towards Goal  Goal: Psychosocial  Outcome: Progressing Towards Goal  Goal: *Oxygen saturation within defined limits  Outcome: Progressing Towards Goal  Goal: *Hemodynamically stable  Outcome: Progressing Towards Goal  Goal: *Vital signs within defined limits  Outcome: Progressing Towards Goal  Goal: *Tolerating diet  Outcome: Progressing Towards Goal  Goal: *Demonstrates progressive activity  Outcome: Progressing Towards Goal  Goal: *Fluid volume maintenance  Outcome: Progressing Towards Goal     Problem: Sepsis: Day 5  Goal: Off Pathway (Use only if patient is Off Pathway)  Outcome: Progressing Towards Goal  Goal: *Oxygen saturation within defined limits  Outcome: Progressing Towards Goal  Goal: *Vital signs within defined limits  Outcome: Progressing Towards Goal  Goal: *Tolerating diet  Outcome: Progressing Towards Goal  Goal: *Demonstrates progressive activity  Outcome: Progressing Towards Goal  Goal: *Discharge plan identified  Outcome: Progressing Towards Goal  Goal: Activity/Safety  Outcome: Progressing Towards Goal  Goal: Consults, if ordered  Outcome: Progressing Towards Goal  Goal: Diagnostic Test/Procedures  Outcome: Progressing Towards Goal  Goal: Nutrition/Diet  Outcome: Progressing Towards Goal  Goal: Discharge Planning  Outcome: Progressing Towards Goal  Goal: Medications  Outcome: Progressing Towards Goal  Goal: Respiratory  Outcome: Progressing Towards Goal  Goal: Treatments/Interventions/Procedures  Outcome: Progressing Towards Goal  Goal: Psychosocial  Outcome: Progressing Towards Goal     Problem: Sepsis: Day 6  Goal: Off Pathway (Use only if patient is Off Pathway)  Outcome: Progressing Towards Goal  Goal: *Oxygen saturation within defined limits  Outcome: Progressing Towards Goal  Goal: *Vital signs within defined limits  Outcome: Progressing Towards Goal  Goal: *Tolerating diet  Outcome: Progressing Towards Goal  Goal: *Demonstrates progressive activity  Outcome: Progressing Towards Goal  Goal: Influenza immunization  Outcome: Progressing Towards Goal  Goal: *Pneumococcal immunization  Outcome: Progressing Towards Goal  Goal: Activity/Safety  Outcome: Progressing Towards Goal  Goal: Diagnostic Test/Procedures  Outcome: Progressing Towards Goal  Goal: Nutrition/Diet  Outcome: Progressing Towards Goal  Goal: Discharge Planning  Outcome: Progressing Towards Goal  Goal: Medications  Outcome: Progressing Towards Goal  Goal: Respiratory  Outcome: Progressing Towards Goal  Goal: Treatments/Interventions/Procedures  Outcome: Progressing Towards Goal  Goal: Psychosocial  Outcome: Progressing Towards Goal     Problem: Sepsis: Discharge Outcomes  Goal: *Vital signs within defined limits  Outcome: Progressing Towards Goal  Goal: *Tolerating diet  Outcome: Progressing Towards Goal  Goal: *Verbalizes understanding and describes prescribed diet  Outcome: Progressing Towards Goal  Goal: *Demonstrates progressive activity  Outcome: Progressing Towards Goal  Goal: *Describes follow-up/return visits to physicians  Outcome: Progressing Towards Goal  Goal: *Verbalizes name, dosage, time, side effects, and number of days to continue medications  Outcome: Progressing Towards Goal  Goal: *Influenza immunization (Oct-Mar only)  Outcome: Progressing Towards Goal  Goal: *Pneumococcal immunization  Outcome: Progressing Towards Goal  Goal: *Lungs clear or at baseline  Outcome: Progressing Towards Goal  Goal: *Oxygen saturation returns to baseline or 90% or better on room air  Outcome: Progressing Towards Goal  Goal: *Glycemic control  Outcome: Progressing Towards Goal  Goal: *Absence of deep venous thrombosis signs and symptoms(Stroke Metric)  Outcome: Progressing Towards Goal  Goal: *Describes available resources and support systems  Outcome: Progressing Towards Goal  Goal: *Optimal pain control at patient's stated goal  Outcome: Progressing Towards Goal

## 2021-06-29 NOTE — PROGRESS NOTES
Infectious Disease Progress Note         Interval:  Afebrile, VSS    Subjective:   Patient reporting still having a lot of pain in the left leg wounds. Reports nausea today and feeling sick to the stomach. Objective:    Vitals:   Reviewed in chart. Physical Exam:  ? GEN: NAD  ? HEENT: Normocephalic, atraumatic, PERRL, no scleral icterus  ? CV: HDS  ? Lungs: room air  ? Abdomen: soft, non distended, obese  ? Genitourinary: , no [place]  ? Extremities: left lower leg wounds are in dressing just cleaned and dressed by wound care nurse. ? Neuro: Alert, oriented to time, place and situation, moves all extremities to commands, verbal   ? Skin: no rash  ? Psych: good affect, good eye contact, non tearful   ? Lines: PIVs         Labs:  Recent Results (from the past 24 hour(s))   CBC WITH AUTOMATED DIFF    Collection Time: 06/29/21  2:21 AM   Result Value Ref Range    WBC 10.5 4.1 - 11.1 K/uL    RBC 3.47 (L) 4.10 - 5.70 M/uL    HGB 9.9 (L) 12.1 - 17.0 g/dL    HCT 30.7 (L) 36.6 - 50.3 %    MCV 88.5 80.0 - 99.0 FL    MCH 28.5 26.0 - 34.0 PG    MCHC 32.2 30.0 - 36.5 g/dL    RDW 15.6 (H) 11.5 - 14.5 %    PLATELET 444 164 - 940 K/uL    MPV 8.3 (L) 8.9 - 12.9 FL    NRBC 0.0 0  WBC    ABSOLUTE NRBC 0.00 0.00 - 0.01 K/uL    NEUTROPHILS 69 32 - 75 %    LYMPHOCYTES 13 12 - 49 %    MONOCYTES 10 5 - 13 %    EOSINOPHILS 5 0 - 7 %    BASOPHILS 1 0 - 1 %    IMMATURE GRANULOCYTES 2 (H) 0.0 - 0.5 %    ABS. NEUTROPHILS 7.3 1.8 - 8.0 K/UL    ABS. LYMPHOCYTES 1.4 0.8 - 3.5 K/UL    ABS. MONOCYTES 1.1 (H) 0.0 - 1.0 K/UL    ABS. EOSINOPHILS 0.5 (H) 0.0 - 0.4 K/UL    ABS. BASOPHILS 0.1 0.0 - 0.1 K/UL    ABS. IMM. GRANS. 0.2 (H) 0.00 - 0.04 K/UL    DF AUTOMATED                 Assessment:  62 yo M with chronic non-healing left lower leg wound for about 2 years after injury from an HVAC unit.  [facility] showed overall non-specific findings but those that could be seen in Pyoderma gangrenosum.  Patient has a dermatologist Dr. Jackie Latham  Patient says he has been getting steroid injections into the wounds on about a monthly basis and this had somewhat started to help the wounds a little. Patient was also started on methotrexate 500mg BID by the dermatologist 2 months ago. He now presented for severe pain in the wounds. Afebrile but WBC 21.7 on arrival. Started on meropenem and linezolid based on most recent ID recommendations from April 2021.      Assessed to have bacterial superinfection of the left leg wounds. Wounds under dressing by wound care team; I reviewed the pictures of the wound from 6/28/21 in chart. ID consulted for abx choice and duration of therapy. Recommendations:  - I have attempted to reach out to his outpatient dermatologist Dr. Bianca Forman in [place], [place] to discuss patient's case. I think he needs referral to an academic center to best help his extensive wounds. In addition, he needs optimized systemic immunotherapy for PG.   - Prior chart review shows that patient has refused to be sent referral for the Limb Salvage Team at [facility]. We will discuss with patient again.   - Also refused BKA in the past.   - Continue meropenem and linezolid for a total of 10 days, 6/25/21 to 7/5/21. Will follow. Thank you for the opportunity to participate in the care of this patient. Please contact with questions or concerns.       Lynette Alegria MD  Infectious Diseases

## 2021-06-29 NOTE — PROGRESS NOTES
I called Dr Jailyn Quintana office and spoke with Fatuma Hassan who says he did not attend his May 5, 2021 new PCP appointment. They would be willing to follow up with him at d/c if desired. [facility] to [facility] and  rehab facilities to assess for admission. [facility] is not available for me to cclink chart to LTC facilities.

## 2021-06-29 NOTE — PROGRESS NOTES
Transition of Care Plan:    RUR: 29% - high risk  Disposition: TBD. See note below  Follow up appointments: PCP, ID, Orthopedic Surgery  DME needed: No needs identified at this time. Has RW and w/c. Recommend PT/OT referral for disposition. Transportation at Discharge: Either Portillo Chambers or CM to arrange [facility] Transportation  [place] or means to access home:  Danya Jimenez has    IM Medicare letter: N/A  Caregiver Contact: Portillo Chambers, friend/CG, [phone]  Discharge Caregiver contacted prior to discharge? CM to contact prior to discharge. Reason for Admission:   Severe sepsis, left lower leg and foot infection with tissue necrosis. RUR Score:    29%     PCP: First and Last name:  None - CM to arrange - Willing to go to [facility]   Name of Practice:   Are you a current patient: Yes/No:   Approximate date of last visit:    Can you do a virtual visit with your PCP:              Resources/supports as identified by patient/family:   Danya Jimenez is his friend who cares for him. Top Challenges facing patient (as identified by patient/family and CM): Finances/Medication cost?     Was living at [address] [place] [address] in [place], [place] for a few months. States he has been there for a few months. Bill as high as $2,000.00. He was living there because he could not get into his friends house. \"Too many steps. \"  Pt gets his prescriptions filled at the [facility] in [place]. Transportation? Pt does not drive. His friend Danya Jimenez transports him. Support system or lack thereof? Limited support (Dallin). Living arrangements? Pt does not know where he will be living when discharged. Pt was living [address] in [facility] because he could not get in the house that he was living in. Before the hotel, pt was living with Dallin and her mother.  Dallin's mother \"broke her hip and is now at [place].\" The address of the house is the followin[address] [place] [address], [address] [place] [address] N           Self-care/ADLs/Cognition? Pt is alert and oriented x 4. He needs assistance with ADLs. He states when he bathes, he cannot get his wound wet. 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 In addition, his wound care was done by Dallin. Current Advanced Directive/Advance Care Plan:  Full Code   Advance Care Planning     General Advance Care Planning (ACP) Conversation      Date of Conversation: 2021  Conducted with: Patient with Decision Making Capacity    Healthcare Decision Maker:     Primary Decision Maker: Ruben Yarbrough - Justina Relative - [phone]    Today we documented Decision Maker(s) consistent with ACP documents on file. Pt has ACP on file and confirms that Danya Jimenez is his primary decision maker. Content/Action Overview: Has ACP document(s) on file - reflects the patient's care preferences  Reviewed DNR/DNI and patient elects Full Code (Attempt Resuscitation)    Length of Voluntary ACP Conversation in minutes:  <16 minutes (Non-Billable)    Healthcare Decision Maker:       Primary Decision Maker: Ruben Yarbrough - Justina Relative - [phone]    Payor Source Payor: [id number] [place], [place] [address] [employer] / Plan: [address] [place] [address] / Product Type: Managed Care Medicaid /                          Plan for utilizing home health:    Pt reports that he is willing to use home health, if recommended. States he has had [place] come and do his wound care before and \"it always ends up being infected. \"                 Transition of Care Plan:   TBD    CM met with patient via phone to complete initial assessment. CM introduced role, verified demographics, and discussed discharge planning. Mr. Davis Cardenas is a 63year old male admitted for severe sepsis, left lower leg and foot infection with tissue necrosis.  He is insured by [employer] and gets his prescriptions at the [facility] in [place]. Pt states that he is not sure where he is going to live at once he is discharged . States he was living at [address] [place] [address] for a few months because he could not get into his friend's home. In addition, his friend's mother has a broken hip and is at [facility]. Pt states it has gotten too expensive. Pt states that he needs assistance with ADLs and IADLs. Pt states his left side is partially paralyzed due to a stroke. Also, he has wounds on left leg / foot. He states that Tara Cooper does his wound care and helps him with his needs. Pt does not drive and receives transporation from [employer]. He has a w/c and cane. Pt has received HH (not sure of company) and Inpt Rehab ([address] [place] [address]) before. Pt denies hx of SNF. At time of discharge, pt is not sure who will transport. If his friend Antoinette Wilson is not available, CM will need to arrange transportation. Unit CM will continue to follow. Care Management Interventions  PCP Verified by CM: Yes (No PCP. CM to arrange.)  Mode of Transport at Discharge: Other (see comment) (Depends on situation with Dallin. It will either be her or CM will have to arrange transportation with Medicaid.)  Discharge Durable Medical Equipment: No (has cane and wheelchair)  Physical Therapy Consult: No (recommend referral)  Occupational Therapy Consult: No (recommend referral)  Speech Therapy Consult: No  Current Support Network:  Other (prior to admission, pt was living at [facility].)  Discharge Location  Discharge Placement: Other: (pt unsure where he will be living at discharge.)    Wilson Roy, RN, BSN, [facility]  [phone]

## 2021-06-29 NOTE — PROGRESS NOTES
Problem: Falls - Risk of  Goal: *Absence of Falls  Description: Document Elkin Gutierrez Fall Risk and appropriate interventions in the flowsheet.   Outcome: Progressing Towards Goal  Note: Fall Risk Interventions:  Mobility Interventions: Bed/chair exit alarm, Patient to call before getting OOB         Medication Interventions: Bed/chair exit alarm, Patient to call before getting OOB    Elimination Interventions: Bed/chair exit alarm, Call light in reach, Patient to call for help with toileting needs    History of Falls Interventions: Bed/chair exit alarm

## 2021-06-29 NOTE — PROGRESS NOTES
End of Shift Note    Bedside shift change report given to [address] (oncoming nurse) by Melven Babinski, RN (offgoing nurse). Report included the following information SBAR and Kardex    Shift worked:  Day     Shift summary and any significant changes:     No events     Concerns for physician to address:  NA     Zone phone for oncoming shift:          Activity:  Activity Level: Up with Assistance  Number times ambulated in hallways past shift: 0  Number of times OOB to chair past shift: 0    Cardiac:   Cardiac Monitoring: Yes      Cardiac Rhythm: Sinus Rhythm    Access:   Current line(s): PIV     Genitourinary:   Urinary status: voiding    Respiratory:   O2 Device: None (Room air)  Chronic home O2 use?: NO  Incentive spirometer at bedside: NO     GI:  Last Bowel Movement Date: 06/28/21  Current diet:  ADULT DIET Regular  Passing flatus: YES  Tolerating current diet: YES       Pain Management:   Patient states pain is manageable on current regimen: YES    Skin:  Dejuan Score: 15  Interventions: float heels and PT/OT consult    Patient Safety:  Fall Score:  Total Score: 4  Interventions: bed/chair alarm, assistive device (walker, cane, etc) and pt to call before getting OOB  High Fall Risk: Yes    Length of Stay:  Expected LOS: 3d 12h  Actual LOS: [address] RN

## 2021-06-30 LAB
ANION GAP SERPL CALC-SCNC: 4 MMOL/L (ref 5–15)
BACTERIA SPEC CULT: NORMAL
BUN SERPL-MCNC: 5 MG/DL (ref 6–20)
BUN/CREAT SERPL: 11 (ref 12–20)
CALCIUM SERPL-MCNC: 8.2 MG/DL (ref 8.5–10.1)
CHLORIDE SERPL-SCNC: 105 MMOL/L (ref 97–108)
CO2 SERPL-SCNC: 24 MMOL/L (ref 21–32)
CREAT SERPL-MCNC: 0.46 MG/DL (ref 0.7–1.3)
ERYTHROCYTE [DISTWIDTH] IN BLOOD BY AUTOMATED COUNT: 15.8 % (ref 11.5–14.5)
GLUCOSE SERPL-MCNC: 94 MG/DL (ref 65–100)
HCT VFR BLD AUTO: 29.5 % (ref 36.6–50.3)
HGB BLD-MCNC: 9.5 G/DL (ref 12.1–17)
MCH RBC QN AUTO: 28.5 PG (ref 26–34)
MCHC RBC AUTO-ENTMCNC: 32.2 G/DL (ref 30–36.5)
MCV RBC AUTO: 88.6 FL (ref 80–99)
NRBC # BLD: 0 K/UL (ref 0–0.01)
NRBC BLD-RTO: 0 PER 100 WBC
PLATELET # BLD AUTO: 352 K/UL (ref 150–400)
PMV BLD AUTO: 8.5 FL (ref 8.9–12.9)
POTASSIUM SERPL-SCNC: 4.2 MMOL/L (ref 3.5–5.1)
RBC # BLD AUTO: 3.33 M/UL (ref 4.1–5.7)
SERVICE CMNT-IMP: NORMAL
SODIUM SERPL-SCNC: 133 MMOL/L (ref 136–145)
WBC # BLD AUTO: 9.7 K/UL (ref 4.1–11.1)

## 2021-06-30 PROCEDURE — 74011250636 HC RX REV CODE- 250/636: Performed by: EMERGENCY MEDICINE

## 2021-06-30 PROCEDURE — 74011250637 HC RX REV CODE- 250/637: Performed by: HOSPITALIST

## 2021-06-30 PROCEDURE — 85027 COMPLETE CBC AUTOMATED: CPT

## 2021-06-30 PROCEDURE — 74011250637 HC RX REV CODE- 250/637: Performed by: INTERNAL MEDICINE

## 2021-06-30 PROCEDURE — 65270000029 HC RM PRIVATE

## 2021-06-30 PROCEDURE — 77030041076 HC DRSG AG OPTICELL MDII -A

## 2021-06-30 PROCEDURE — 80048 BASIC METABOLIC PNL TOTAL CA: CPT

## 2021-06-30 PROCEDURE — 74011000258 HC RX REV CODE- 258: Performed by: EMERGENCY MEDICINE

## 2021-06-30 PROCEDURE — 36415 COLL VENOUS BLD VENIPUNCTURE: CPT

## 2021-06-30 PROCEDURE — 74011250636 HC RX REV CODE- 250/636: Performed by: HOSPITALIST

## 2021-06-30 PROCEDURE — 99233 SBSQ HOSP IP/OBS HIGH 50: CPT | Performed by: STUDENT IN AN ORGANIZED HEALTH CARE EDUCATION/TRAINING PROGRAM

## 2021-06-30 PROCEDURE — 2709999900 HC NON-CHARGEABLE SUPPLY

## 2021-06-30 PROCEDURE — 74011250637 HC RX REV CODE- 250/637: Performed by: STUDENT IN AN ORGANIZED HEALTH CARE EDUCATION/TRAINING PROGRAM

## 2021-06-30 RX ORDER — DIPHENHYDRAMINE HCL 25 MG
25 CAPSULE ORAL
Status: DISCONTINUED | OUTPATIENT
Start: 2021-06-30 | End: 2021-07-15 | Stop reason: HOSPADM

## 2021-06-30 RX ADMIN — LINEZOLID 600 MG: 600 INJECTION, SOLUTION INTRAVENOUS at 21:34

## 2021-06-30 RX ADMIN — MEROPENEM 500 MG: 500 INJECTION, POWDER, FOR SOLUTION INTRAVENOUS at 00:43

## 2021-06-30 RX ADMIN — OXYCODONE 10 MG: 5 TABLET ORAL at 05:32

## 2021-06-30 RX ADMIN — MEROPENEM 500 MG: 500 INJECTION, POWDER, FOR SOLUTION INTRAVENOUS at 12:36

## 2021-06-30 RX ADMIN — MEROPENEM 500 MG: 500 INJECTION, POWDER, FOR SOLUTION INTRAVENOUS at 17:23

## 2021-06-30 RX ADMIN — METHADONE HYDROCHLORIDE 140 MG: 10 CONCENTRATE ORAL at 09:23

## 2021-06-30 RX ADMIN — OXYCODONE 10 MG: 5 TABLET ORAL at 20:15

## 2021-06-30 RX ADMIN — TAMSULOSIN HYDROCHLORIDE 0.4 MG: 0.4 CAPSULE ORAL at 09:22

## 2021-06-30 RX ADMIN — LEVOTHYROXINE SODIUM 125 MCG: 0.12 TABLET ORAL at 05:32

## 2021-06-30 RX ADMIN — OXYCODONE 10 MG: 5 TABLET ORAL at 09:22

## 2021-06-30 RX ADMIN — ASPIRIN 81 MG: 81 TABLET, CHEWABLE ORAL at 09:22

## 2021-06-30 RX ADMIN — SODIUM CHLORIDE 10 ML: 9 INJECTION, SOLUTION INTRAMUSCULAR; INTRAVENOUS; SUBCUTANEOUS at 21:35

## 2021-06-30 RX ADMIN — GABAPENTIN 100 MG: 100 CAPSULE ORAL at 15:05

## 2021-06-30 RX ADMIN — OXYCODONE 10 MG: 5 TABLET ORAL at 00:41

## 2021-06-30 RX ADMIN — HYDROMORPHONE HYDROCHLORIDE 1 MG: 2 INJECTION, SOLUTION INTRAMUSCULAR; INTRAVENOUS; SUBCUTANEOUS at 03:16

## 2021-06-30 RX ADMIN — GABAPENTIN 100 MG: 100 CAPSULE ORAL at 21:34

## 2021-06-30 RX ADMIN — HYDROMORPHONE HYDROCHLORIDE 1 MG: 2 INJECTION, SOLUTION INTRAMUSCULAR; INTRAVENOUS; SUBCUTANEOUS at 17:19

## 2021-06-30 RX ADMIN — FAMOTIDINE 20 MG: 20 TABLET ORAL at 09:21

## 2021-06-30 RX ADMIN — FAMOTIDINE 20 MG: 20 TABLET ORAL at 17:24

## 2021-06-30 RX ADMIN — OXYCODONE 10 MG: 5 TABLET ORAL at 15:58

## 2021-06-30 RX ADMIN — DIPHENHYDRAMINE HYDROCHLORIDE 25 MG: 25 CAPSULE ORAL at 15:58

## 2021-06-30 RX ADMIN — SODIUM CHLORIDE 10 ML: 9 INJECTION, SOLUTION INTRAMUSCULAR; INTRAVENOUS; SUBCUTANEOUS at 14:49

## 2021-06-30 RX ADMIN — Medication 1 CAPSULE: at 09:22

## 2021-06-30 RX ADMIN — Medication 3 MG: at 00:49

## 2021-06-30 RX ADMIN — GABAPENTIN 100 MG: 100 CAPSULE ORAL at 09:22

## 2021-06-30 RX ADMIN — ONDANSETRON 4 MG: 4 TABLET, ORALLY DISINTEGRATING ORAL at 00:40

## 2021-06-30 RX ADMIN — ALLOPURINOL 100 MG: 100 TABLET ORAL at 09:22

## 2021-06-30 RX ADMIN — HYDROMORPHONE HYDROCHLORIDE 1 MG: 2 INJECTION, SOLUTION INTRAMUSCULAR; INTRAVENOUS; SUBCUTANEOUS at 12:50

## 2021-06-30 RX ADMIN — LISINOPRIL 20 MG: 20 TABLET ORAL at 09:21

## 2021-06-30 RX ADMIN — ACETAMINOPHEN 650 MG: 325 TABLET ORAL at 22:28

## 2021-06-30 RX ADMIN — DOCUSATE SODIUM 50MG AND SENNOSIDES 8.6MG 1 TABLET: 8.6; 5 TABLET, FILM COATED ORAL at 09:21

## 2021-06-30 RX ADMIN — MEROPENEM 500 MG: 500 INJECTION, POWDER, FOR SOLUTION INTRAVENOUS at 05:32

## 2021-06-30 RX ADMIN — SODIUM CHLORIDE 10 ML: 9 INJECTION, SOLUTION INTRAMUSCULAR; INTRAVENOUS; SUBCUTANEOUS at 06:05

## 2021-06-30 RX ADMIN — LINEZOLID 600 MG: 600 INJECTION, SOLUTION INTRAVENOUS at 10:25

## 2021-06-30 NOTE — PROGRESS NOTES
Attempted to see pt for PT eval. Pt refusing mobilization today due to severe pain. Nursing and ID aware of pts pain level and refusal of session. Will defer today and continue to follow.
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
## 2021-06-30 NOTE — PROGRESS NOTES
I had a detailed discussion with patient's dermatologist Dr. Analia Liang today. Patient under her care since ~11/2020. Has tried multiple courses of oral steroids, but is avoiding long-term systemic steroids. Unfortunately, patient has had social issues including transportation issues (with his limited mobility worsening matters) and has not had regular and timely follow-up. The dermatologist has considered therapy with cyclosporin and Humera, but in this patient the risks are outweighing with these potent therapies and his lack of regular follow up ability. His social situation and barriers have worsened this diseases process of PG. Dr. Analia Liang also said that patient underwent a wound debridement at [facility] ~2020 which worsened the wounds a lot. At this time both Dr. Analia Liang and myself strongly recommend patient to get a dermatology evaluation at an academic center (patient is OK with going to [facility]) for a comprehensive evaluation and optimum care. Analia Aquino had also attempted to get this done for [facility] a few months ago but it did not go through. We are unable to say whether his limb is salvageable at this time, and this is why a comprehensive dermatology evaluation and wound care will benefit the patient. Given patient's worsening wounds and pain, I recommend patient have an [facility][id number] transfer. Will discuss with case management regarding logistics on 7/1/21.         Megan Ann MD  Infectious Diseases

## 2021-06-30 NOTE — PROGRESS NOTES
Problem: Risk for Spread of Infection  Goal: Prevent transmission of infectious organism to others  Description: Prevent the transmission of infectious organisms to other patients, staff members, and visitors. Outcome: Progressing Towards Goal     Problem: Falls - Risk of  Goal: *Absence of Falls  Description: Document Sahara Chang Fall Risk and appropriate interventions in the flowsheet.   Outcome: Progressing Towards Goal  Note: Fall Risk Interventions:  Mobility Interventions: Assess mobility with egress test, Communicate number of staff needed for ambulation/transfer, Bed/chair exit alarm, Patient to call before getting OOB         Medication Interventions: Assess postural VS orthostatic hypotension, Bed/chair exit alarm, Patient to call before getting OOB, Teach patient to arise slowly    Elimination Interventions: Bed/chair exit alarm, Call light in reach, Patient to call for help with toileting needs    History of Falls Interventions: Bed/chair exit alarm, Investigate reason for fall         Problem: Patient Education:  Go to Education Activity  Goal: Patient/Family Education  Outcome: Progressing Towards Goal     Problem: Patient Education: Go to Patient Education Activity  Goal: Patient/Family Education  Outcome: Progressing Towards Goal     Problem: Sepsis: Day of Diagnosis  Goal: Off Pathway (Use only if patient is Off Pathway)  Outcome: Resolved/Met  Goal: *Fluid resuscitation  Outcome: Resolved/Met  Goal: *Paired blood cultures prior to first dose of antibiotic  Outcome: Resolved/Met  Goal: *First dose of  appropriate antibiotic within 3 hours of arrival to ED, within 1 hour of arrival to ICU  Outcome: Resolved/Met  Goal: *Lactic acid with first set of blood cultures  Outcome: Resolved/Met  Goal: *Pneumococcal immunization (if eligible)  Outcome: Resolved/Met  Goal: *Influenza immunization (if eligible)  Outcome: Resolved/Met  Goal: Activity/Safety  Outcome: Resolved/Met  Goal: Consults, if ordered  Outcome: Resolved/Met  Goal: Diagnostic Test/Procedures  Outcome: Resolved/Met  Goal: Nutrition/Diet  Outcome: Resolved/Met  Goal: Discharge Planning  Outcome: Resolved/Met  Goal: Medications  Outcome: Resolved/Met  Goal: Respiratory  Outcome: Resolved/Met  Goal: Treatments/Interventions/Procedures  Outcome: Resolved/Met  Goal: Psychosocial  Outcome: Resolved/Met     Problem: Sepsis: Day 2  Goal: Off Pathway (Use only if patient is Off Pathway)  Outcome: Resolved/Met  Goal: *Central Venous Pressure maintained at 8-12 mm Hg  Outcome: Resolved/Met  Goal: *Hemodynamically stable  Outcome: Resolved/Met  Goal: *Tolerating diet  Outcome: Resolved/Met  Goal: Activity/Safety  Outcome: Resolved/Met  Goal: Consults, if ordered  Outcome: Resolved/Met  Goal: Diagnostic Test/Procedures  Outcome: Resolved/Met  Goal: Nutrition/Diet  Outcome: Resolved/Met  Goal: Discharge Planning  Outcome: Resolved/Met  Goal: Medications  Outcome: Resolved/Met  Goal: Respiratory  Outcome: Resolved/Met  Goal: Treatments/Interventions/Procedures  Outcome: Resolved/Met  Goal: Psychosocial  Outcome: Resolved/Met     Problem: Sepsis: Day 3  Goal: Off Pathway (Use only if patient is Off Pathway)  Outcome: Resolved/Met  Goal: *Central Venous Pressure maintained at 8-12 mm Hg  Outcome: Resolved/Met  Goal: *Oxygen saturation within defined limits  Outcome: Resolved/Met  Goal: *Vital sign stability  Outcome: Resolved/Met  Goal: *Tolerating diet  Outcome: Resolved/Met  Goal: *Demonstrates progressive activity  Outcome: Resolved/Met  Goal: Activity/Safety  Outcome: Resolved/Met  Goal: Consults, if ordered  Outcome: Resolved/Met  Goal: Diagnostic Test/Procedures  Outcome: Resolved/Met  Goal: Nutrition/Diet  Outcome: Resolved/Met  Goal: Discharge Planning  Outcome: Resolved/Met  Goal: Medications  Outcome: Resolved/Met  Goal: Respiratory  Outcome: Resolved/Met  Goal: Treatments/Interventions/Procedures  Outcome: Resolved/Met  Goal: Psychosocial  Outcome: Resolved/Met     Problem: Sepsis: Day 4  Goal: Off Pathway (Use only if patient is Off Pathway)  Outcome: Resolved/Met  Goal: Activity/Safety  Outcome: Resolved/Met  Goal: Consults, if ordered  Outcome: Resolved/Met  Goal: Diagnostic Test/Procedures  Outcome: Resolved/Met  Goal: Nutrition/Diet  Outcome: Resolved/Met  Goal: Discharge Planning  Outcome: Resolved/Met  Goal: Medications  Outcome: Resolved/Met  Goal: Respiratory  Outcome: Resolved/Met  Goal: Treatments/Interventions/Procedures  Outcome: Resolved/Met  Goal: Psychosocial  Outcome: Resolved/Met  Goal: *Oxygen saturation within defined limits  Outcome: Resolved/Met  Goal: *Hemodynamically stable  Outcome: Resolved/Met  Goal: *Vital signs within defined limits  Outcome: Resolved/Met  Goal: *Tolerating diet  Outcome: Resolved/Met  Goal: *Demonstrates progressive activity  Outcome: Resolved/Met  Goal: *Fluid volume maintenance  Outcome: Resolved/Met     Problem: Sepsis: Day 5  Goal: Off Pathway (Use only if patient is Off Pathway)  Outcome: Progressing Towards Goal  Goal: *Oxygen saturation within defined limits  Outcome: Progressing Towards Goal  Goal: *Vital signs within defined limits  Outcome: Progressing Towards Goal  Goal: *Tolerating diet  Outcome: Progressing Towards Goal  Goal: *Demonstrates progressive activity  Outcome: Progressing Towards Goal  Goal: *Discharge plan identified  Outcome: Progressing Towards Goal  Goal: Activity/Safety  Outcome: Progressing Towards Goal  Goal: Consults, if ordered  Outcome: Progressing Towards Goal  Goal: Diagnostic Test/Procedures  Outcome: Progressing Towards Goal  Goal: Nutrition/Diet  Outcome: Progressing Towards Goal  Goal: Discharge Planning  Outcome: Progressing Towards Goal  Goal: Medications  Outcome: Progressing Towards Goal  Goal: Respiratory  Outcome: Progressing Towards Goal  Goal: Treatments/Interventions/Procedures  Outcome: Progressing Towards Goal  Goal: Psychosocial  Outcome: Progressing Towards Goal     Problem: Hypertension  Goal: *Blood pressure within specified parameters  Outcome: Progressing Towards Goal  Goal: *Fluid volume balance  Outcome: Progressing Towards Goal  Goal: *Labs within defined limits  Outcome: Progressing Towards Goal     Problem: Patient Education: Go to Patient Education Activity  Goal: Patient/Family Education  Outcome: Progressing Towards Goal     Problem: Pressure Injury - Risk of  Goal: *Prevention of pressure injury  Description: Document Dejuan Scale and appropriate interventions in the flowsheet. Note: Pressure Injury Interventions:  Sensory Interventions: Assess changes in LOC, Assess need for specialty bed    Moisture Interventions: Absorbent underpads, Apply protective barrier, creams and emollients, Check for incontinence Q2 hours and as needed    Activity Interventions: Assess need for specialty bed, Increase time out of bed    Mobility Interventions: Assess need for specialty bed, PT/OT evaluation    Nutrition Interventions: Document food/fluid/supplement intake    Friction and Shear Interventions: Minimize layers                Problem: Pressure Injury - Risk of  Goal: *Prevention of pressure injury  Description: Document Dejuan Scale and appropriate interventions in the flowsheet.   Note: Pressure Injury Interventions:  Sensory Interventions: Assess changes in LOC, Assess need for specialty bed    Moisture Interventions: Absorbent underpads, Apply protective barrier, creams and emollients, Check for incontinence Q2 hours and as needed    Activity Interventions: Assess need for specialty bed, Increase time out of bed    Mobility Interventions: Assess need for specialty bed, PT/OT evaluation    Nutrition Interventions: Document food/fluid/supplement intake    Friction and Shear Interventions: Minimize layers                Problem: Patient Education: Go to Patient Education Activity  Goal: Patient/Family Education  Outcome: Progressing Towards Goal

## 2021-06-30 NOTE — PROGRESS NOTES
2200: Bedside shift change report given to Callum Simental 90 (oncoming nurse) by Blanche Daniels RN (offgoing nurse). Report included the following information SBAR, ED Summary, Intake/Output, MAR, Recent Results and Cardiac Rhythm NSR.     0700: End of Shift Note    Bedside shift change report given to [address] (oncoming nurse) by Lakshmi Velázquez RN (offgoing nurse). Report included the following information SBAR, ED Summary, Intake/Output, MAR, Recent Results and Cardiac Rhythm NSR    Shift worked:  night      Shift summary and any significant changes:    Severe pain and nausea d/t pain medications. PO zofran given x1. Concerns for physician to address:  see above      Zone phone for oncoming shift:          Activity:  Activity Level: Up with Assistance  Number times ambulated in hallways past shift: 0  Number of times OOB to chair past shift: 0    Cardiac:   Cardiac Monitoring: Yes      Cardiac Rhythm: Sinus Rhythm    Access:   Current line(s): PIV     Genitourinary:   Urinary status: voiding    Respiratory:   O2 Device: None (Room air)  Chronic home O2 use?: NO  Incentive spirometer at bedside: NO     GI:  Last Bowel Movement Date: 06/29/21  Current diet:  ADULT DIET Regular  Passing flatus: YES  Tolerating current diet: YES       Pain Management:   Patient states pain is manageable on current regimen: YES    Skin:  Dejuan Score: 16  Interventions: float heels and increase time out of bed    Patient Safety:  Fall Score:  Total Score: 4  Interventions: bed/chair alarm, assistive device (walker, cane, etc), gripper socks and pt to call before getting OOB  High Fall Risk: Yes    Length of Stay:  Expected LOS: 3d 12h  Actual LOS: 5      Lakshmi Velázquez RN

## 2021-06-30 NOTE — PROGRESS NOTES
[address] INTERDISCIPLINARY ROUNDS    Cardiopulmonary Care Interdisciplinary Rounds were held today to discuss patient's plan of care and outcomes. The following members were present: NP/Physician, Pharmacy, Nursing and Case Management.     PLAN OF CARE:   Continue current treatment plan    Expected Length of Stay:  3d 12h

## 2021-06-30 NOTE — PROGRESS NOTES
Hospitalist Progress Note    NAME: Iglesia Jean Baptiste   :  1965   MRN:  [id number]       Assessment / Plan:  Severe sepsis POA resolved  Left leg necrotic ulcer due to prior trauma and pyoderma gangrenosum  -Continue meropenem and Zyvox based on culture results per ID recommendations  -Continue wound care  -Follow final blood and wound culture results  -Discussed with ID today the strongly recommend outpatient follow-up at [facility] for further care of wounds  -ID will decide on duration of antibiotics and further plan after discussion with primary dermatologist  -Discussed with ID today  -Pending rheumatology evaluation  -Patient is not interested in amputation at this time  -Pain control with oxycodone and Dilaudid  -Patient reports he does not have good support at home so he may need placement for wound care, IV antibiotics and also rehab      GI upset from recent nsaid use  GERD  --pepcid bid     HTN  --continue lisinopril     Gout flare, left hand  --continue allopurinol. 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 Counseling about lifestyle modification provided        Body mass index is 31.66 kg/m². Estimated discharge date:  once okay with ID  Barriers: Pending PT evaluation    Code status: Full  Prophylaxis: Lovenox  Recommended Disposition: TBD     Subjective:     Chief Complaint / Reason for Physician Visit  Continues to report pain and left leg. Also reports itching.   No fever or diarrhea      Review of Systems:  Symptom Y/N Comments  Symptom Y/N Comments   Fever/Chills    Chest Pain     Poor Appetite    Edema     Cough    Abdominal Pain     Sputum    Joint Pain     SOB/BECK    Pruritis/Rash     Nausea/vomit    Tolerating PT/OT     Diarrhea    Tolerating Diet     Constipation    Other       Could NOT obtain due to:      Objective:     VITALS:   Last 24hrs VS reviewed since prior progress note. Most recent are:  Patient Vitals for the past 24 hrs:   Temp Pulse Resp BP SpO2   06/30/21 1241 -- 64 12 (!) 156/94 --   06/30/21 1115 98.1 °F (36.7 °C) 62 12 94/61 95 %   06/30/21 0745 98.1 °F (36.7 °C) 67 10 103/66 96 %   06/30/21 0250 97.8 °F (36.6 °C) 77 12 130/78 97 %   06/29/21 2230 97.7 °F (36.5 °C) 93 14 (!) 121/90 96 %   06/29/21 2005 98 °F (36.7 °C) 73 19 111/73 96 %       Intake/Output Summary (Last 24 hours) at 6/30/2021 1447  Last data filed at 6/30/2021 0745  Gross per 24 hour   Intake 2050 ml   Output 600 ml   Net 1450 ml        I had a face to face encounter and independently examined this patient on 6/30/2021, as outlined below:    PHYSICAL EXAM:    General: WD, WN. Alert, cooperative, no acute distress    EENT:  EOMI. Anicteric sclerae. MMM  Resp:  CTA bilaterally, no wheezing or rales. No accessory muscle use  CV:  Regular  rhythm,  No edema  GI:  Soft, Non distended, Non tender. +Bowel sounds  Neurologic:  Alert and oriented X 3, normal speech,   Psych:   Good insight. Not anxious nor agitated  Skin:  Left leg: Large deep circumferential ulcer involving almost all skin below the mid sheen to the forefoot, yellowish fibrinous necrotic base with foul smelling odor, rolled out ulcer edge with minimal surrounding erythema      Reviewed most current lab test results and cultures  YES  Reviewed most current radiology test results   YES  Review and summation of old records today    NO  Reviewed patient's current orders and MAR    YES  PMH/SH reviewed - no change compared to H&P  ________________________________________________________________________  Care Plan discussed with:    Comments   Patient x    Family      RN x    Care Manager     Consultant                        Multidiciplinary team rounds were held today with , nursing, pharmacist and clinical coordinator.   Patient's plan of care was discussed; medications were reviewed and discharge planning was addressed. ________________________________________________________________________  Total NON critical care TIME:  40   Minutes    Total CRITICAL CARE TIME Spent:   Minutes non procedure based      Comments   >50% of visit spent in counseling and coordination of care x    ________________________________________________________________________  Mariah Kaur MD     Procedures: see electronic medical records for all procedures/Xrays and details which were not copied into this note but were reviewed prior to creation of Plan. LABS:  I reviewed today's most current labs and imaging studies.   Pertinent labs include:  Recent Labs     06/30/21  0403 06/29/21  0221 06/28/21  0212   WBC 9.7 10.5 12.1*   HGB 9.5* 9.9* 10.7*   HCT 29.5* 30.7* 32.3*    388 455*     Recent Labs     06/30/21  0403 06/28/21  0212   * 131*   K 4.2 4.2    100   CO2 24 26   GLU 94 79   BUN 5* 5*   CREA 0.46* 0.70   CA 8.2* 8.4*       Signed: Mariah Kaur MD

## 2021-06-30 NOTE — PROGRESS NOTES
Infectious Disease Progress Note         Interval:  Afebrile, VSS    Subjective:   Patient reporting still having a lot of pain in the left leg wounds. Reports nausea today and feeling sick to the stomach. Objective:    Vitals:   Reviewed in chart. Physical Exam:  ? GEN: NAD  ? HEENT: Normocephalic, atraumatic, PERRL, no scleral icterus  ? CV: HDS  ? Lungs: room air  ? Abdomen: soft, non distended, obese  ? Genitourinary: , no b[place]  ? Extremities: left lower leg wounds are in dressing just cleaned and dressed by wound care nurse. ? Neuro: Alert, oriented to time, place and situation, moves all extremities to commands, verbal   ? Skin: no rash  ? Psych: good affect, good eye contact, non tearful   ? Lines: PIVs         Labs:  Recent Results (from the past 24 hour(s))   CBC W/O DIFF    Collection Time: 06/30/21  4:03 AM   Result Value Ref Range    WBC 9.7 4.1 - 11.1 K/uL    RBC 3.33 (L) 4.10 - 5.70 M/uL    HGB 9.5 (L) 12.1 - 17.0 g/dL    HCT 29.5 (L) 36.6 - 50.3 %    MCV 88.6 80.0 - 99.0 FL    MCH 28.5 26.0 - 34.0 PG    MCHC 32.2 30.0 - 36.5 g/dL    RDW 15.8 (H) 11.5 - 14.5 %    PLATELET 402 712 - 624 K/uL    MPV 8.5 (L) 8.9 - 12.9 FL    NRBC 0.0 0  WBC    ABSOLUTE NRBC 0.00 0.00 - 5.07 K/uL   METABOLIC PANEL, BASIC    Collection Time: 06/30/21  4:03 AM   Result Value Ref Range    Sodium 133 (L) 136 - 145 mmol/L    Potassium 4.2 3.5 - 5.1 mmol/L    Chloride 105 97 - 108 mmol/L    CO2 24 21 - 32 mmol/L    Anion gap 4 (L) 5 - 15 mmol/L    Glucose 94 65 - 100 mg/dL    BUN 5 (L) 6 - 20 MG/DL    Creatinine 0.46 (L) 0.70 - 1.30 MG/DL    BUN/Creatinine ratio 11 (L) 12 - 20      GFR est AA >60 >60 ml/min/1.73m2    GFR est non-AA >60 >60 ml/min/1.73m2    Calcium 8.2 (L) 8.5 - 10.1 MG/DL               Assessment:  62 yo M with chronic non-healing left lower leg wound for about 2 years after injury from an HVAC unit.  Biopsy pathology on 4/25/19 at [facility] showed overall non-specific findings but those that could be seen in Pyoderma gangrenosum.  Patient has a dermatologist Dr. Jazzmine Cedeno  Patient says he has been getting steroid injections into the wounds on about a monthly basis and this had somewhat started to help the wounds a little. Patient was also started on methotrexate 500mg BID by the dermatologist 2 months ago. He now presented for severe pain in the wounds. Afebrile but WBC 21.7 on arrival. Started on meropenem and linezolid based on most recent ID recommendations from April 2021.      Assessed to have bacterial superinfection of the left leg wounds. Wounds under dressing by wound care team; I reviewed the pictures of the wound from 6/28/21 in chart. ID consulted for abx choice and duration of therapy. Recommendations:  - Patient had a recent biopsy of left shin in 05/2021 which again supported the diagnosis of pyoderma gangrenosus. See Media for report. - I have attempted to reach out to his outpatient dermatologist Dr. Shannan Spangler again today in [place], [place] to discuss patient's case. Patient tells me the dermatologist was planning to get a second opinion herself for the patient. - I will recommend patient be referred to an academic center to best help evaluate his extensive wounds. In addition, he needs optimized systemic immunotherapy for PG. I discussed this with the patient today - he said he does not want to go to [facility], but is fine to be referred to [facility]. - Also refused BKA in the past, and wants to give his leg wounds full chance to heal with optimum care before making this decision.   - Continue meropenem and linezolid for a total of 10 days, 6/25/21 to 7/5/21.  - Unfortunately, the primary issue is the PG and patient is prone towards these infections and consistent optimum wound care is basic to reduce such complications and towards wound care itself. These infections are also making the primary skin condition worse      Will follow.          Thank you for the opportunity to participate in the care of this patient. Please contact with questions or concerns.       Lynette Alegria MD  Infectious Diseases

## 2021-07-01 PROCEDURE — 74011250637 HC RX REV CODE- 250/637: Performed by: HOSPITALIST

## 2021-07-01 PROCEDURE — 74011000258 HC RX REV CODE- 258: Performed by: EMERGENCY MEDICINE

## 2021-07-01 PROCEDURE — 74011250636 HC RX REV CODE- 250/636: Performed by: HOSPITALIST

## 2021-07-01 PROCEDURE — 74011250637 HC RX REV CODE- 250/637: Performed by: INTERNAL MEDICINE

## 2021-07-01 PROCEDURE — 97535 SELF CARE MNGMENT TRAINING: CPT

## 2021-07-01 PROCEDURE — 65270000029 HC RM PRIVATE

## 2021-07-01 PROCEDURE — 97161 PT EVAL LOW COMPLEX 20 MIN: CPT

## 2021-07-01 PROCEDURE — 74011250637 HC RX REV CODE- 250/637: Performed by: NURSE PRACTITIONER

## 2021-07-01 PROCEDURE — 97166 OT EVAL MOD COMPLEX 45 MIN: CPT

## 2021-07-01 PROCEDURE — 74011250637 HC RX REV CODE- 250/637: Performed by: STUDENT IN AN ORGANIZED HEALTH CARE EDUCATION/TRAINING PROGRAM

## 2021-07-01 PROCEDURE — 97530 THERAPEUTIC ACTIVITIES: CPT

## 2021-07-01 PROCEDURE — 99223 1ST HOSP IP/OBS HIGH 75: CPT | Performed by: NURSE PRACTITIONER

## 2021-07-01 PROCEDURE — 99233 SBSQ HOSP IP/OBS HIGH 50: CPT | Performed by: STUDENT IN AN ORGANIZED HEALTH CARE EDUCATION/TRAINING PROGRAM

## 2021-07-01 PROCEDURE — 74011250636 HC RX REV CODE- 250/636: Performed by: EMERGENCY MEDICINE

## 2021-07-01 PROCEDURE — 74011250636 HC RX REV CODE- 250/636: Performed by: NURSE PRACTITIONER

## 2021-07-01 RX ORDER — ALLOPURINOL 100 MG/1
100 TABLET ORAL DAILY
Status: DISCONTINUED | OUTPATIENT
Start: 2021-07-02 | End: 2021-07-02

## 2021-07-01 RX ORDER — COLCHICINE 0.6 MG/1
0.6 TABLET ORAL
Status: DISCONTINUED | OUTPATIENT
Start: 2021-07-01 | End: 2021-07-01

## 2021-07-01 RX ORDER — COLCHICINE 0.6 MG/1
0.6 TABLET ORAL
Status: DISCONTINUED | OUTPATIENT
Start: 2021-07-01 | End: 2021-07-15 | Stop reason: HOSPADM

## 2021-07-01 RX ORDER — FENTANYL CITRATE 50 UG/ML
25 INJECTION, SOLUTION INTRAMUSCULAR; INTRAVENOUS ONCE
Status: COMPLETED | OUTPATIENT
Start: 2021-07-01 | End: 2021-07-01

## 2021-07-01 RX ORDER — HYDROMORPHONE HYDROCHLORIDE 2 MG/1
4 TABLET ORAL
Status: DISCONTINUED | OUTPATIENT
Start: 2021-07-01 | End: 2021-07-07

## 2021-07-01 RX ORDER — ALLOPURINOL 100 MG/1
100 TABLET ORAL 3 TIMES DAILY
Status: DISCONTINUED | OUTPATIENT
Start: 2021-07-01 | End: 2021-07-01

## 2021-07-01 RX ADMIN — HYDROMORPHONE HYDROCHLORIDE 1 MG: 2 INJECTION, SOLUTION INTRAMUSCULAR; INTRAVENOUS; SUBCUTANEOUS at 11:12

## 2021-07-01 RX ADMIN — SODIUM CHLORIDE 10 ML: 9 INJECTION, SOLUTION INTRAMUSCULAR; INTRAVENOUS; SUBCUTANEOUS at 05:14

## 2021-07-01 RX ADMIN — FAMOTIDINE 20 MG: 20 TABLET ORAL at 09:07

## 2021-07-01 RX ADMIN — Medication 1 CAPSULE: at 09:07

## 2021-07-01 RX ADMIN — HYDROMORPHONE HYDROCHLORIDE 4 MG: 2 TABLET ORAL at 16:18

## 2021-07-01 RX ADMIN — LINEZOLID 600 MG: 600 INJECTION, SOLUTION INTRAVENOUS at 22:54

## 2021-07-01 RX ADMIN — SODIUM CHLORIDE 10 ML: 9 INJECTION, SOLUTION INTRAMUSCULAR; INTRAVENOUS; SUBCUTANEOUS at 21:22

## 2021-07-01 RX ADMIN — DIPHENHYDRAMINE HYDROCHLORIDE 25 MG: 25 CAPSULE ORAL at 10:25

## 2021-07-01 RX ADMIN — HYDROMORPHONE HYDROCHLORIDE 4 MG: 2 TABLET ORAL at 21:22

## 2021-07-01 RX ADMIN — LEVOTHYROXINE SODIUM 125 MCG: 0.12 TABLET ORAL at 05:13

## 2021-07-01 RX ADMIN — ASPIRIN 81 MG: 81 TABLET, CHEWABLE ORAL at 09:07

## 2021-07-01 RX ADMIN — COLCHICINE 0.6 MG: 0.6 TABLET, FILM COATED ORAL at 03:54

## 2021-07-01 RX ADMIN — GABAPENTIN 100 MG: 100 CAPSULE ORAL at 09:07

## 2021-07-01 RX ADMIN — ACETAMINOPHEN 650 MG: 325 TABLET ORAL at 15:25

## 2021-07-01 RX ADMIN — ACETAMINOPHEN 650 MG: 325 TABLET ORAL at 22:53

## 2021-07-01 RX ADMIN — ENOXAPARIN SODIUM 40 MG: 40 INJECTION SUBCUTANEOUS at 09:07

## 2021-07-01 RX ADMIN — MEROPENEM 500 MG: 500 INJECTION, POWDER, FOR SOLUTION INTRAVENOUS at 12:17

## 2021-07-01 RX ADMIN — Medication 3 MG: at 22:53

## 2021-07-01 RX ADMIN — OXYCODONE 10 MG: 5 TABLET ORAL at 00:38

## 2021-07-01 RX ADMIN — MEROPENEM 500 MG: 500 INJECTION, POWDER, FOR SOLUTION INTRAVENOUS at 17:17

## 2021-07-01 RX ADMIN — HYDROMORPHONE HYDROCHLORIDE 1 MG: 2 INJECTION, SOLUTION INTRAMUSCULAR; INTRAVENOUS; SUBCUTANEOUS at 05:51

## 2021-07-01 RX ADMIN — OXYCODONE 10 MG: 5 TABLET ORAL at 04:23

## 2021-07-01 RX ADMIN — OXYCODONE 10 MG: 5 TABLET ORAL at 09:06

## 2021-07-01 RX ADMIN — TAMSULOSIN HYDROCHLORIDE 0.4 MG: 0.4 CAPSULE ORAL at 09:07

## 2021-07-01 RX ADMIN — GABAPENTIN 100 MG: 100 CAPSULE ORAL at 16:18

## 2021-07-01 RX ADMIN — METHADONE HYDROCHLORIDE 140 MG: 10 CONCENTRATE ORAL at 09:03

## 2021-07-01 RX ADMIN — MEROPENEM 500 MG: 500 INJECTION, POWDER, FOR SOLUTION INTRAVENOUS at 05:14

## 2021-07-01 RX ADMIN — MEROPENEM 500 MG: 500 INJECTION, POWDER, FOR SOLUTION INTRAVENOUS at 00:05

## 2021-07-01 RX ADMIN — FENTANYL CITRATE 25 MCG: 50 INJECTION, SOLUTION INTRAMUSCULAR; INTRAVENOUS at 03:50

## 2021-07-01 RX ADMIN — ACETAMINOPHEN 650 MG: 325 TABLET ORAL at 07:02

## 2021-07-01 RX ADMIN — SODIUM CHLORIDE 10 ML: 9 INJECTION, SOLUTION INTRAMUSCULAR; INTRAVENOUS; SUBCUTANEOUS at 14:06

## 2021-07-01 RX ADMIN — DOCUSATE SODIUM 50MG AND SENNOSIDES 8.6MG 1 TABLET: 8.6; 5 TABLET, FILM COATED ORAL at 09:07

## 2021-07-01 RX ADMIN — FAMOTIDINE 20 MG: 20 TABLET ORAL at 17:17

## 2021-07-01 RX ADMIN — LISINOPRIL 20 MG: 20 TABLET ORAL at 09:07

## 2021-07-01 RX ADMIN — GABAPENTIN 100 MG: 100 CAPSULE ORAL at 21:22

## 2021-07-01 RX ADMIN — LINEZOLID 600 MG: 600 INJECTION, SOLUTION INTRAVENOUS at 09:10

## 2021-07-01 RX ADMIN — ALLOPURINOL 100 MG: 100 TABLET ORAL at 09:07

## 2021-07-01 RX ADMIN — HYDROMORPHONE HYDROCHLORIDE 1 MG: 2 INJECTION, SOLUTION INTRAMUSCULAR; INTRAVENOUS; SUBCUTANEOUS at 01:43

## 2021-07-01 NOTE — PROGRESS NOTES
Received message from patient's nurse stating / informing that    having a gout flare up. he usually takes colchicine 0.6 mg for these flare ups at home. can he have a PRN or 1x order for this? he is really uncomfortable         Discussion / orders:    Per PTA home medication list, patient takes colchicine 0.6 mg by mouth daily as needed for gout or pain. Entered order for colchicine as above           Please note that this note was dictated using Dragon computer voice recognition software. Quite often unanticipated grammatical, syntax, homophones, and other interpretive errors are inadvertently transcribed by the computer software. Please disregard these errors. Please excuse any errors that have escaped final proofreading.

## 2021-07-01 NOTE — PROGRESS NOTES
Infectious Disease Progress Note         Interval:  Afebrile, VSS    Subjective:   Patient reports relentless pain in the left leg. Objective:    Vitals:   Reviewed in chart. Physical Exam:  ? GEN: NAD  ? HEENT: Normocephalic, atraumatic, PERRL, no scleral icterus  ? CV: HDS  ? Lungs: room air  ? Abdomen: soft, non distended, obese  ? Genitourinary: , no b[place]  ? Extremities: left lower leg wounds are in dressing  ? Neuro: Alert, oriented to time, place and situation, moves all extremities to commands, verbal   ? Skin: no rash  ? Psych: good affect, good eye contact, non tearful   ? Lines: PIVs         Labs:  No results found for this or any previous visit (from the past 24 hour(s)). Assessment:  62 yo M with chronic non-healing left lower leg wound for about 2 years after injury from an HVAC unit. Biopsy pathology on 4/25/19 at [facility] showed overall non-specific findings but those that could be seen in Pyoderma gangrenosum.  Patient has a dermatologist Dr. Analia Liang  Patient says he has been getting steroid injections into the wounds on about a monthly basis and this had somewhat started to help the wounds a little. Patient was also started on methotrexate 500mg BID by the dermatologist 2 months ago. He now presented for severe pain in the wounds. Afebrile but WBC 21.7 on arrival. Started on meropenem and linezolid based on most recent ID recommendations from April 2021.      Assessed to have bacterial superinfection of the left leg wounds. Wounds under dressing by wound care team; I reviewed the pictures of the wound from 6/28/21 in chart. ID consulted for abx choice and duration of therapy. Recommendations:  - Patient had a recent biopsy of left shin in 05/2021 by his outpatient dermatologist Dr. Analia Liang which again supported the diagnosis of pyoderma gangrenosus. See Media for report.    - Today, the hospitalist Dr. Leti Joyce, case management, patient's nurse and myself met with the patient to explain to him the long-term wound care that will be needed for his wound care. We explained once an [facility] center accepts him, patient will need to be in a place where in consistent follow up will be able to be maintained. We said that a nursing home might be one of the places he can go to. Patient was previously refraining nursing care placement due to personal experiences. We explained that without consistent and regular follow up, any care he received in the hospital will spiral down and have no benefits. Patient has agreed to placement in nursing home if accepted by [facility] for care. He said he wants to give a full chance for his legs wounds to heal before needing a BKA. Please see hospitalist and case management notes for further details. - Continue meropenem and linezolid for a total of 10 days, 6/25/21 to 7/5/21.  - Unfortunately, the primary issue is the PG and patient is prone towards these infections and consistent optimum wound care is basic to reduce such complications and towards wound care itself. These infections are also making the primary skin condition worse      Will follow. Thank you for the opportunity to participate in the care of this patient. Please contact with questions or concerns.       Sam Lora MD  Infectious Diseases

## 2021-07-01 NOTE — PROGRESS NOTES
Problem: Risk for Spread of Infection  Goal: Prevent transmission of infectious organism to others  Description: Prevent the transmission of infectious organisms to other patients, staff members, and visitors. Outcome: Progressing Towards Goal     Problem: Patient Education:  Go to Education Activity  Goal: Patient/Family Education  Outcome: Progressing Towards Goal     Problem: Falls - Risk of  Goal: *Absence of Falls  Description: Document Veronika Salcido Fall Risk and appropriate interventions in the flowsheet.   Outcome: Progressing Towards Goal  Note: Fall Risk Interventions:  Mobility Interventions: Bed/chair exit alarm, OT consult for ADLs, Strengthening exercises (ROM-active/passive), Utilize walker, cane, or other assistive device, PT Consult for assist device competence, PT Consult for mobility concerns, Patient to call before getting OOB, Utilize gait belt for transfers/ambulation         Medication Interventions: Bed/chair exit alarm, Patient to call before getting OOB, Teach patient to arise slowly    Elimination Interventions: Bed/chair exit alarm, Call light in reach, Patient to call for help with toileting needs, Toilet paper/wipes in reach, Toileting schedule/hourly rounds, Urinal in reach    History of Falls Interventions: Bed/chair exit alarm, Door open when patient unattended, Room close to nurse's station         Problem: Patient Education: Go to Patient Education Activity  Goal: Patient/Family Education  Outcome: Progressing Towards Goal     Problem: Patient Education: Go to Patient Education Activity  Goal: Patient/Family Education  Outcome: Progressing Towards Goal     Problem: Sepsis: Day 5  Goal: Off Pathway (Use only if patient is Off Pathway)  Outcome: Resolved/Met  Goal: *Oxygen saturation within defined limits  Outcome: Resolved/Met  Goal: *Vital signs within defined limits  Outcome: Resolved/Met  Goal: *Tolerating diet  Outcome: Resolved/Met  Goal: *Demonstrates progressive activity  Outcome: Resolved/Met  Goal: *Discharge plan identified  Outcome: Resolved/Met  Goal: Activity/Safety  Outcome: Resolved/Met  Goal: Consults, if ordered  Outcome: Resolved/Met  Goal: Diagnostic Test/Procedures  Outcome: Resolved/Met  Goal: Nutrition/Diet  Outcome: Resolved/Met  Goal: Discharge Planning  Outcome: Resolved/Met  Goal: Medications  Outcome: Resolved/Met  Goal: Respiratory  Outcome: Resolved/Met  Goal: Treatments/Interventions/Procedures  Outcome: Resolved/Met  Goal: Psychosocial  Outcome: Resolved/Met     Problem: Sepsis: Day 6  Goal: Off Pathway (Use only if patient is Off Pathway)  Outcome: Progressing Towards Goal  Goal: *Oxygen saturation within defined limits  Outcome: Progressing Towards Goal  Goal: *Vital signs within defined limits  Outcome: Progressing Towards Goal  Goal: *Tolerating diet  Outcome: Progressing Towards Goal  Goal: *Demonstrates progressive activity  Outcome: Progressing Towards Goal  Goal: Influenza immunization  Outcome: Progressing Towards Goal  Goal: *Pneumococcal immunization  Outcome: Progressing Towards Goal  Goal: Activity/Safety  Outcome: Progressing Towards Goal  Goal: Diagnostic Test/Procedures  Outcome: Progressing Towards Goal  Goal: Nutrition/Diet  Outcome: Progressing Towards Goal  Goal: Discharge Planning  Outcome: Progressing Towards Goal  Goal: Medications  Outcome: Progressing Towards Goal  Goal: Respiratory  Outcome: Progressing Towards Goal  Goal: Treatments/Interventions/Procedures  Outcome: Progressing Towards Goal  Goal: Psychosocial  Outcome: Progressing Towards Goal     Problem: Hypertension  Goal: *Blood pressure within specified parameters  Outcome: Progressing Towards Goal  Goal: *Fluid volume balance  Outcome: Progressing Towards Goal  Goal: *Labs within defined limits  Outcome: Progressing Towards Goal     Problem: Patient Education: Go to Patient Education Activity  Goal: Patient/Family Education  Outcome: Progressing Towards Goal     Problem: Pressure Injury - Risk of  Goal: *Prevention of pressure injury  Description: Document Dejuan Scale and appropriate interventions in the flowsheet.   Outcome: Progressing Towards Goal  Note: Pressure Injury Interventions:  Sensory Interventions: Assess changes in LOC, Keep linens dry and wrinkle-free, Maintain/enhance activity level, Minimize linen layers, Pressure redistribution bed/mattress (bed type), Sit a 90-degree angle/use footstool if needed    Moisture Interventions: Absorbent underpads, Check for incontinence Q2 hours and as needed, Minimize layers, Offer toileting Q_hr    Activity Interventions: Increase time out of bed, PT/OT evaluation, Pressure redistribution bed/mattress(bed type)    Mobility Interventions: HOB 30 degrees or less, Pressure redistribution bed/mattress (bed type), PT/OT evaluation    Nutrition Interventions: Document food/fluid/supplement intake    Friction and Shear Interventions: Apply protective barrier, creams and emollients, Minimize layers, Sit at 90-degree angle                Problem: Patient Education: Go to Patient Education Activity  Goal: Patient/Family Education  Outcome: Progressing Towards Goal

## 2021-07-01 NOTE — PROGRESS NOTES
PHYSICAL THERAPY EVALUATION/DISCHARGE  Patient: Francesca Mazariegos (89 y.o. male)  Date: 7/1/2021  Primary Diagnosis: Severe sepsis ([facility] [address] [A41.9, R65.20]  Left leg cellulitis [T14.519]       Precautions: Falls         ASSESSMENT  Based on the objective data described below, the patient presents with impaired functional mobility, severe pain, non ambulatory due to wounds on left foot and lower leg, high falls risk, and residual left sided weakness from CVA. Patient received in bed, attempted visit early in the day but patient refused due to high pain levels but now agreeable to work on transfer to w/c. Patient educated on use of sliding board but was unable to safely complete transfer due to difficulty weight shifting and height of bed and w/c. Patient then performed lateral transfer with close SBA and extra person to stabilize w/c with good tolerance, but had difficulty transferring back to bed and required min assist for safety. Patient educated on safety and falls prevention and  returned to supine, appears to be close to functional  baseline and is primarily  limited by pain and wounds, plan is to transfer to [facility] for consult. Functional Outcome Measure: The patient scored 60/100 on the Barthel  outcome measure which is indicative of moderate functional impairment     Other factors to consider for discharge: falls risk, non ambulatory     Further skilled acute physical therapy is not indicated at this time. PLAN :  Recommendation for discharge: (in order for the patient to meet his/her long term goals)  To be determined: transfer to [facility]    This discharge recommendation:  Has been made in collaboration with the attending provider and/or case management    IF patient discharges home will need the following DME: patient owns DME required for discharge       SUBJECTIVE:   Patient stated this is the way I do it, you are messing me up.     OBJECTIVE DATA SUMMARY:   HISTORY:    Past Medical History:   Diagnosis Date    Aneurysm [facility])     (with stroke) brain    Bladder tumor     Cancer ([facility] [address]     bladder CA    Chronic pain     Family history of bladder cancer     GERD (gastroesophageal reflux disease)     Gout     History of vascular access device 04/25/2019    [facility] VAT 4 FR MIDLINE R Cephalic Limited access    History of vascular access device 04/26/2019    4 fr Midline right Cephalic midline access    Hypercholesterolemia     Hypertension     Lesion of bladder     Seizures ([facility] [address]     Stroke ([facility] [address] 2006    left sided weakness    Thromboembolus ([facility] [address]     left leg    Thyroid disease     TIA (transient ischemic attack) 2012     Past Surgical History:   Procedure Laterality Date    HX ORTHOPAEDIC      R arthroscopy    HX OTHER SURGICAL      x2 L foot    HX UROLOGICAL  04/20/2018    Cystoscopy, TURBT (greater than 5 cm resected) Dr. Zelda Mcclain, [facility].  KNEE ARTHROSCP HARV      left knee    TRANSURETHRAL RESEC BLADDER NECK  08/10/2018       Prior level of function: w/c bound  Personal factors and/or comorbidities impacting plan of care: CVA with left sided weakness, falls, wounds    Home Situation  Home Environment: Other (comment) (lives in hotel)  One/Two Story Residence: One story  # of Interior Steps: 12  Living Alone: Yes  Support Systems: Friends \ neighbors  Patient Expects to be Discharged to[de-identified] Unknown  Current DME Used/Available at Home: Wheelchair, Cane, straight    EXAMINATION/PRESENTATION/DECISION MAKING:   Critical Behavior:  Neurologic State: Alert  Orientation Level: Oriented X4  Cognition: Decreased command following, Impulsive, Poor safety awareness  Safety/Judgement: Decreased awareness of need for safety, Decreased awareness of need for assistance  Hearing:   Auditory  Auditory Impairment: None  Range Of Motion:  AROM: Generally decreased, functional (non-functional LUE and LLE)                       Strength:    Strength: Generally decreased, functional (non-functional LUE and LLE) Tone & Sensation:   Tone: Abnormal (non-functional LUE)                              Coordination:  Coordination: Generally decreased, functional (non-functional LUE and LLE)  Vision:   Acuity: Within Defined Limits (not formally tested)  Functional Mobility:  Bed Mobility:  Rolling: Modified independent  Supine to Sit: Modified independent  Sit to Supine: Modified independent  Scooting: Modified independent  Transfers:              Bed to Chair: Contact guard assistance;Minimum assistance (transfer from EOB <> w/c)              Balance:   Sitting: Intact  Standing:  (not tested)  Ambulation/Gait Training:                                                         Stairs:              Functional Measure:  Barthel Index:    Bathin  Bladder: 10  Bowels: 10  Groomin  Dressin  Feeding: 10  Mobility: 5  Stairs: 0  Toilet Use: 5  Transfer (Bed to Chair and Back): 10  Total: 60/100       The Barthel ADL Index: Guidelines  1. The index should be used as a record of what a patient does, not as a record of what a patient could do. 2. The main aim is to establish degree of independence from any help, physical or verbal, however minor and for whatever reason. 3. The need for supervision renders the patient not independent. 4. A patient's performance should be established using the best available evidence. Asking the patient, friends/relatives and nurses are the usual sources, but direct observation and common sense are also important. However direct testing is not needed. 5. Usually the patient's performance over the preceding 24-48 hours is important, but occasionally longer periods will be relevant. 6. Middle categories imply that the patient supplies over 50 per cent of the effort. 7. Use of aids to be independent is allowed. Joan Morejon., Barthel, D.W. ([id number]). Functional evaluation: the Barthel Index. [facility] (14)2.   ISAI Noland, Gila Julian., Nella Haji, Lalito Craven. (1999). Measuring the change indisability after inpatient rehabilitation; comparison of the responsiveness of the Barthel Index and Functional [place] Measure. Journal of Neurology, Neurosurgery, and Psychiatry, 66(4), 726-721. DORCAS Johnson, ELEANOR Hines, & Malu Kiser M.A. (2004.) Assessment of post-stroke quality of life in cost-effectiveness studies: The usefulness of the Barthel Index and the EuroQoL-5D. Quality of Life Research, 15, 813-91          Physical Therapy Evaluation Charge Determination   History Examination Presentation Decision-Making   MEDIUM  Complexity : 1-2 comorbidities / personal factors will impact the outcome/ POC  LOW Complexity : 1-2 Standardized tests and measures addressing body structure, function, activity limitation and / or participation in recreation  LOW Complexity : Stable, uncomplicated  LOW Complexity : FOTO score of       Based on the above components, the patient evaluation is determined to be of the following complexity level: LOW     Pain Rating:  10/10    Activity Tolerance:   Fair      After treatment patient left in no apparent distress:   Supine in bed and Call bell within reach    COMMUNICATION/EDUCATION:   The patients plan of care was discussed with: Occupational therapist and Registered nurse. Fall prevention education was provided and the patient/caregiver indicated understanding.     Thank you for this referral.  Alexandra Dale, PT

## 2021-07-01 NOTE — CONSULTS
Palliative Medicine Consult  Ashish: [phone] ([phone])    Patient Name: Francesca Mazariegos  YOB: 1965    Date of Initial Consult: 7/1/21  Reason for Consult: pain management  Requesting Provider: Bud Rivera MD   Primary Care Physician: None     SUMMARY:   Francesca Mazariegos is a 63 y.o. with a past history of CVA, gout, seizures, prior DVT, GERD, bladder cancer, chronic nonhealing wound on his left lower extremity x 2 years, [facility] for chronic pain, who was admitted on 6/25/2021 from home with a diagnosis of severe sepsis, severe diffuse osteopenia. Continue Meropenuem and linezolid for total of 10 days (6/25/21 to 7/5/21). Should be ready for discharge 7/5/21. Current medical issues leading to Palliative Medicine involvement include: pain management for complex pain. Per chart review, first document of LLE cellulitis in August 2014, that started when an HVAC unit fell on his leg, with progression over the years to now. He has been admitted to multiple different hospitals including [id number] [facility], [facility], [address] , [facility], [facility], he has been seen by multiple surgeons, plastic surgeon, all who have recommended amputation. Pt with significant history of no-show and appt rescheduling with his dermatologist.  Per infectious disease 4/2021,  , she has advised pt that continued antibiotic therapy would be futile and predispose to development of resistant organisms. Per ID note 6/28/21 Yohan Echols primary issue is the PG and patient is prone towards these infections and consistent optimum wound care is basic to reduce such complications and towards wound care itself. These infections are also making the primary skin condition worse. In addition, he is going to develop more and more resistant organisms down the line. \"  Attempts are being made to transfer pt to an academic center [facility]) for dermatology clinic. Psychosocial: Pt has [facility] for his insurance. Pt lives in a handicap hotel ([employer] in [facility]) with his friend. Pt is wheelchair bound and requires assistance for his ADL/IADLs. Pt's friend drives him to his appointments. AMD on file, primary surrogate decision maker Irlanda De Los Santos ([phone])   PALLIATIVE DIAGNOSES:   1. Chronic pain: methadone 140mg daily in outpatient setting, gabapentin 100mg tid    2. Long-term current use of methadone for pain control  3. Opioid dependence  4. Chronic non-healing wound LLE  5. Depression  6. Physical debility   7. Left sided pain s/p stroke     PLAN:   1. Prior to visit, I completed an extensive review of patient's medical records, including medical documentation, vital signs, MARs, and results of various labs and other diagnostics. I also spoke with patient's attending Con Delarosa,  Ese Ruano, addiction specialist Sandra Grayson. 2. Met with pt: pt states pain has been so bad that \"I sold all my guns and sometimes am afraid to be home alone. \"  He denied plan to harm self. States he would prefer to not be going to a methadone clinic and would rather be treated by a pain clinic \"where pain is the focus of treatment. \"  He is adamant that \"I won't ever allow my leg to be amputated no matter what. \"  I explained to him that if 140mg of Methadone is not helping control his pain, there really isn't much else to offer. He states that the 1mg IV Dilaudid helps some but not for very long. Discussed changing it to oral which should work longer, however, I don't know of any provider or program that would continue it once he is discharged. He states that typically the Methadone controls the pain somewhat, although not optimal, and he only needs the extra (dilaudid) while he is dealing with the infection. He also requests that we change the order of Colchicine be changed from daily to bid, which is how he takes it at home. He states that he knows he is getting a gout flare because his left forearm is swollen. When asked about the pain in his LLE, he states it's 9/10, unable to describe, when offered ideas such as \"dull, sharp, achy, burning, shooting\" he stated \"all of those. \"   1. Colchicine 0.6mg bid. 2. D/c IV dilaudid. 3. Dilaudid 4mg PO q 4 hours prn.   3. Initial consult note routed to primary continuity provider and/or primary health care team members  4. Communicated plan of care with: Palliative Garrett CHOUDHURY 192 Team     GOALS OF CARE / TREATMENT PREFERENCES:     GOALS OF CARE:  Patient/Health Care Proxy Stated Goals: Prolong life    TREATMENT PREFERENCES:   Code Status: Full Code    Advance Care Planning:  [] [facility] Interdisciplinary Team has updated the ACP Navigator with Health Care Decision Maker and Patient Capacity      Primary Decision MakerSamie Sera - Other Relative - [phone]  Advance Care Planning 6/25/2021   Patient's Healthcare Decision Maker is: -   Primary Decision Maker Name -   Primary Decision Maker Phone Number -   Primary Decision Maker Relationship to Patient -   Confirm Advance Directive Yes, on file   Patient Would Like to Complete Advance Directive -       Medical Interventions: Full interventions             Other:    As far as possible, the palliative care team has discussed with patient / health care proxy about goals of care / treatment preferences for patient. HISTORY:     History obtained from: chart, patient    CHIEF COMPLAINT: worsening pain LLE    HPI/SUBJECTIVE:    The patient is:   [x] Verbal and participatory  [] Non-participatory due to:     6/25: presents to ED with worsening infection, pain, and drainage from LLE wounds.     Clinical Pain Assessment (nonverbal scale for severity on nonverbal patients):   Clinical Pain Assessment  Severity: 9  Location: left foot  Character: sharp, dull achy, burning, shooting  Duration: days  Frequency: constant Activity (Movement): Restless, excessive activity and/or withdrawal reflexes    Duration: for how long has pt been experiencing pain (e.g., 2 days, 1 month, years)  Frequency: how often pain is an issue (e.g., several times per day, once every few days, constant)     FUNCTIONAL ASSESSMENT:     Palliative Performance Scale (PPS):  PPS: 40       PSYCHOSOCIAL/SPIRITUAL SCREENING:     Palliative IDT has assessed this patient for cultural preferences / practices and a referral made as appropriate to needs (Cultural Services, Patient Advocacy, Ethics, etc.)    Any spiritual / Baptist concerns:  [] Yes /  [x] No    Caregiver Burnout:  [] Yes /  [x] No /  [] No Caregiver Present      Anticipatory grief assessment:   [x] Normal  / [] Maladaptive       ESAS Anxiety: Anxiety: 0    ESAS Depression: Depression: 4        REVIEW OF SYSTEMS:     Positive and pertinent negative findings in ROS are noted above in HPI. The following systems were [x] reviewed / [] unable to be reviewed as noted in HPI  Other findings are noted below. Systems: constitutional, ears/nose/mouth/throat, respiratory, gastrointestinal, genitourinary, musculoskeletal, integumentary, neurologic, psychiatric, endocrine. Positive findings noted below. Modified ESAS Completed by: provider   Fatigue: 3 Drowsiness: 0   Depression: 4 Pain: 9   Anxiety: 0 Nausea: 0   Anorexia: 0 Dyspnea: 0     Constipation: No     Stool Occurrence(s): 1        PHYSICAL EXAM:     From RN flowsheet:  Wt Readings from Last 3 Encounters:   07/01/21 249 lb 5.4 oz (113.1 kg)   04/27/21 254 lb 9.6 oz (115.5 kg)   04/15/21 240 lb (108.9 kg)     Blood pressure (!) 140/91, pulse 84, temperature 97.6 °F (36.4 °C), resp. rate 16, height 6' 1\" (1.854 m), weight 249 lb 5.4 oz (113.1 kg), SpO2 96 %.     Pain Scale 1: Numeric (0 - 10)  Pain Intensity 1: 9  Pain Onset 1: Chronic  Pain Location 1: Leg  Pain Orientation 1: Left  Pain Description 1: Aching, Constant  Pain Intervention(s) 1: Medication (see MAR)  Last bowel movement, if known:     Constitutional: AAOx3, NAD, moderately depressed (situational)  Eyes: pupils equal, anicteric  ENMT: no nasal discharge, moist mucous membranes  Cardiovascular: regular rhythm, distal pulses intact  Respiratory: breathing not labored, symmetric  Gastrointestinal: soft non-tender, +bowel sounds  Musculoskeletal: no deformity, no tenderness to palpation  Skin: warm, dry, LLE dressing with serous drainage  Neurologic: following commands, moving all extremities  Psychiatric: depressed affect           HISTORY:     Active Problems:    Severe sepsis ([address] (6/25/2021)      Left leg cellulitis (6/25/2021)      Past Medical History:   Diagnosis Date    Aneurysm ([address]     (with stroke) brain    Bladder tumor     Cancer ([address]     bladder CA    Chronic pain     Family history of bladder cancer     GERD (gastroesophageal reflux disease)     Gout     History of vascular access device 04/25/2019    [facility] VAT 4 FR MIDLINE R Cephalic Limited access    History of vascular access device 04/26/2019    4 fr Midline right Cephalic midline access    Hypercholesterolemia     Hypertension     Lesion of bladder     Seizures ([address]     Stroke ([address] 2006    left sided weakness    Thromboembolus (HCC)     left leg    Thyroid disease     TIA (transient ischemic attack) 2012      Past Surgical History:   Procedure Laterality Date    HX ORTHOPAEDIC      R arthroscopy    HX OTHER SURGICAL      x2 L foot    HX UROLOGICAL  04/20/2018    Cystoscopy, TURBT (greater than 5 cm resected) Dr. Tyra Hilario, [facility].  KNEE ARTHROSCP HARV      left knee    TRANSURETHRAL RESEC BLADDER NECK  08/10/2018      Family History   Problem Relation Age of Onset    Diabetes Father     Lung Disease Father     Diabetes Sister     Diabetes Brother     Cancer Paternal Grandfather         Bladder      History reviewed, no pertinent family history.   Social History     Tobacco Use    Smoking status: Current Every Day Smoker     Packs/day: 0.50    Smokeless tobacco: Never Used    Tobacco comment: instructed not to smoke 24 hrs prior surgery   Substance Use Topics    Alcohol use:  Yes     Alcohol/week: 6.0 standard drinks     Types: 6 Cans of beer per week     Comment: occasional     Allergies   Allergen Reactions    Sulfamethoxazole-Trimethoprim Rash     L eye and L hand    Ciprofloxacin Hives     Per pcp records    Codeine Hives     Tolerates dilaudid, oxycodone    Cymbalta [Duloxetine] Other (comments)     Confusion and memory loss    Lyrica [Pregabalin] Hives    Sulfa (Sulfonamide Antibiotics) Rash    Ultram [Tramadol] Hives    Vancomycin Shortness of Breath    Zosyn [Piperacillin-Tazobactam] Rash      Current Facility-Administered Medications   Medication Dose Route Frequency    HYDROmorphone (DILAUDID) tablet 4 mg  4 mg Oral Q4H PRN    colchicine tablet 0.6 mg  0.6 mg Oral BID PRN    [START ON 7/2/2021] allopurinoL (ZYLOPRIM) tablet 100 mg  100 mg Oral DAILY    diphenhydrAMINE (BENADRYL) capsule 25 mg  25 mg Oral Q6H PRN    oxyCODONE IR (ROXICODONE) tablet 10 mg  10 mg Oral Q4H PRN    naloxone (NARCAN) injection 0.2 mg  0.2 mg IntraVENous EVERY 2 MINUTES AS NEEDED    calcium carbonate (TUMS) chewable tablet 400 mg [elemental]  400 mg Oral TID PRN    lidocaine (XYLOCAINE) 4 % cream   Topical PRN    sodium chloride (NS) flush 5-10 mL  5-10 mL IntraVENous PRN    linezolid in dextrose 5% (ZYVOX) IVPB premix in D5W 600 mg  600 mg IntraVENous Q12H    meropenem (MERREM) 500 mg in 0.9% sodium chloride (MBP/ADV) 50 mL MBP  0.5 g IntraVENous Q6H    sodium chloride (NS) flush 5-40 mL  5-40 mL IntraVENous Q8H    sodium chloride (NS) flush 5-40 mL  5-40 mL IntraVENous PRN    acetaminophen (TYLENOL) tablet 650 mg  650 mg Oral Q6H PRN    Or    acetaminophen (TYLENOL) suppository 650 mg  650 mg Rectal Q6H PRN    polyethylene glycol (MIRALAX) packet 17 g  17 g Oral DAILY PRN    ondansetron (ZOFRAN ODT) tablet 4 mg  4 mg Oral Q8H PRN    Or    ondansetron (ZOFRAN) injection 4 mg  4 mg IntraVENous Q6H PRN    enoxaparin (LOVENOX) injection 40 mg  40 mg SubCUTAneous DAILY    aspirin chewable tablet 81 mg  81 mg Oral DAILY    gabapentin (NEURONTIN) capsule 100 mg  100 mg Oral TID    levothyroxine (SYNTHROID) tablet 125 mcg  125 mcg Oral ACB    lisinopriL (PRINIVIL, ZESTRIL) tablet 20 mg  20 mg Oral DAILY    melatonin tablet 3 mg  3 mg Oral QHS PRN    methadone (DOLOPHINE) 10 mg/mL concentrated solution 140 mg  140 mg Oral DAILY    tamsulosin (FLOMAX) capsule 0.4 mg  0.4 mg Oral DAILY    senna-docusate (PERICOLACE) 8.6-50 mg per tablet 1 Tablet  1 Tablet Oral DAILY    naloxone (NARCAN) injection 0.4 mg  0.4 mg IntraVENous PRN    famotidine (PEPCID) tablet 20 mg  20 mg Oral BID    L.acidophilus-paracasei-S.thermophil-bifidobacter (RISAQUAD) 8 billion cell capsule  1 Capsule Oral DAILY          LAB AND IMAGING FINDINGS:     Lab Results   Component Value Date/Time    WBC 9.7 06/30/2021 04:03 AM    HGB 9.5 (L) 06/30/2021 04:03 AM    PLATELET 072 33/66/0546 04:03 AM     Lab Results   Component Value Date/Time    Sodium 133 (L) 06/30/2021 04:03 AM    Potassium 4.2 06/30/2021 04:03 AM    Chloride 105 06/30/2021 04:03 AM    CO2 24 06/30/2021 04:03 AM    BUN 5 (L) 06/30/2021 04:03 AM    Creatinine 0.46 (L) 06/30/2021 04:03 AM    Calcium 8.2 (L) 06/30/2021 04:03 AM    Magnesium 1.8 10/23/2020 02:27 AM    Phosphorus 4.4 10/23/2020 02:27 AM      Lab Results   Component Value Date/Time    Alk.  phosphatase 165 (H) 06/25/2021 12:04 PM    Protein, total 6.9 06/25/2021 12:04 PM    Albumin 2.9 (L) 06/25/2021 12:04 PM    Globulin 4.0 06/25/2021 12:04 PM     Lab Results   Component Value Date/Time    INR 1.1 12/21/2017 11:28 AM    Prothrombin time 11.4 (H) 12/21/2017 11:28 AM    aPTT 29.3 12/21/2017 11:28 AM      No results found for: IRON, FE, TIBC, IBCT, PSAT, FERR   No results found for: PH, PCO2, PO2  No components found for: [place]   Lab Results   Component Value Date/Time    CK 60 04/25/2019 05:10 AM    CK - MB 1.1 12/21/2017 11:28 AM                Total time: 120  Counseling / coordination time, spent as noted above: 90  > 50% counseling / coordination?: y    Prolonged service was provided for  []30 min   []75 min in face to face time in the presence of the patient, spent as noted above. Time Start:   Time End:   Note: this can only be billed with 24950 (initial) or 00897 (follow up). If multiple start / stop times, list each separately.
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
## 2021-07-01 NOTE — PROGRESS NOTES
Hospitalist Progress Note    NAME: Merary Rose   :  1965   MRN:  [id number]       Assessment / Plan:  Severe sepsis POA resolved  Left leg necrotic ulcer due to prior trauma and pyoderma gangrenosum  Intractable leg pain  -has been following with Dr Paul Tamez (derm) since 2020 and has tried multiple courses of oral steroids. May need cyclosporin or humira. Had wound debridement at [facility] in  which may have worsened the wounds in retrospect.      -Patient needs formal evaluation in an academic center. His current social situation has remained a barrier for consistent follow up, wound care or subspecialty consultation. Patient has agreed to go to a SNF or NH if needed.      -Continue meropenem and Zyvox   -Continue wound care  -continue dilaudid IV for pain. Already on high dose of methadone. Pain is what he brings up all the time. His situation has gotten worse over the past year. He does not want any amputation but he wants to get better and pursue [facility] transfer if needed. I will ask palliative care to help with emotional support and pain med adjustments  -I will call the [facility] transfer center today. GI upset from recent nsaid use  GERD  --pepcid bid     HTN  --continue lisinopril     Gout flare, left hand  --continue allopurinol. 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 Counseling about lifestyle modification provided        Body mass index is 31.66 kg/m². Estimated discharge date:  once okay with ID  Barriers: Pending PT evaluation    Code status: Full  Prophylaxis: Lovenox  Recommended Disposition: TBD                 Subjective:     Chief Complaint / Reason for Physician Visit  Follow up of sepsis, PG, gout  Pain is his number one issue today.      Review of Systems:  Symptom Y/N Comments  Symptom Y/N Comments   Fever/Chills    Chest Pain     Poor Appetite    Edema     Cough    Abdominal Pain     Sputum    Joint Pain     SOB/BECK    Pruritis/Rash     Nausea/vomit    Tolerating PT/OT     Diarrhea    Tolerating Diet     Constipation    Other       Could NOT obtain due to:      Objective:     VITALS:   Last 24hrs VS reviewed since prior progress note. Most recent are:  Patient Vitals for the past 24 hrs:   Temp Pulse Resp BP SpO2   07/01/21 0726 98.2 °F (36.8 °C) 67 14 119/63 93 %   07/01/21 0350 98.2 °F (36.8 °C) 70 16 123/73 93 %   06/30/21 2339 98 °F (36.7 °C) 79 13 122/78 --   06/30/21 2017 98.2 °F (36.8 °C) 69 13 104/69 96 %   06/30/21 1514 98.1 °F (36.7 °C) 66 12 106/66 94 %   06/30/21 1241 -- 64 12 (!) 156/94 --       Intake/Output Summary (Last 24 hours) at 7/1/2021 1120  Last data filed at 7/1/2021 4761  Gross per 24 hour   Intake 1110 ml   Output 900 ml   Net 210 ml        I had a face to face encounter and independently examined this patient on 7/1/2021, as outlined below:    PHYSICAL EXAM:    General: WD, WN. Alert, cooperative, no acute distress    EENT:  EOMI. Anicteric sclerae. MMM  Resp:  CTA bilaterally, no wheezing or rales. No accessory muscle use  CV:  Regular  rhythm,  No edema  GI:  Soft, Non distended, Non tender. +Bowel sounds  Neurologic:  Alert and oriented X 3, normal speech,   Psych:   Good insight.  Not anxious nor agitated  Skin:  Left leg: Large deep circumferential ulcer involving almost all skin below the mid sheen to the forefoot, yellowish fibrinous necrotic base with foul smelling odor, rolled out ulcer edge with minimal surrounding erythema      Reviewed most current lab test results and cultures  YES  Reviewed most current radiology test results   YES  Review and summation of old records today    NO  Reviewed patient's current orders and MAR    YES  PMH/SH reviewed - no change compared to H&P  ________________________________________________________________________  Care Plan discussed with:    Comments   Patient x    Family      RN x    Care Manager     Consultant                        Multidiciplinary team rounds were held today with , nursing, pharmacist and clinical coordinator. Patient's plan of care was discussed; medications were reviewed and discharge planning was addressed. ________________________________________________________________________  Total NON critical care TIME:  40   Minutes    Total CRITICAL CARE TIME Spent:   Minutes non procedure based      Comments   >50% of visit spent in counseling and coordination of care x    ________________________________________________________________________  Haley Ruano MD     Procedures: see electronic medical records for all procedures/Xrays and details which were not copied into this note but were reviewed prior to creation of Plan. LABS:  I reviewed today's most current labs and imaging studies.   Pertinent labs include:  Recent Labs     06/30/21 0403 06/29/21  0221   WBC 9.7 10.5   HGB 9.5* 9.9*   HCT 29.5* 30.7*    388     Recent Labs     06/30/21  0403   *   K 4.2      CO2 24   GLU 94   BUN 5*   CREA 0.46*   CA 8.2*       Signed: Haley Ruano MD

## 2021-07-01 NOTE — PROGRESS NOTES
Spiritual Care Assessment/Progress Note  [facility]      NAME: Marley Moya      MRN: [id number]  AGE: 63 y.o.  SEX: male  Mandaeism Affiliation: Nondenominational   Language: English     7/1/2021     Total Time (in minutes): 20     Spiritual Assessment begun in [facility] CARDIOPULMONARY CARE through conversation with:         [x]Patient        [] Family    [] Friend(s)        Reason for Consult: Initial/Spiritual assessment, patient floor     Spiritual beliefs: (Please include comment if needed)     [x] Identifies with a kendy tradition: Carrie         [] Supported by a kendy community:            [] Claims no spiritual orientation:           [] Seeking spiritual identity:                [] Adheres to an individual form of spirituality:           [] Not able to assess:                           Identified resources for coping:      [x] Prayer                               [] Music                  [] Guided Imagery     [] Family/friends                 [] Pet visits     [] Devotional reading                         [] Unknown     [] Other:                                               Interventions offered during this visit: (See comments for more details)    Patient Interventions: Affirmation of emotions/emotional suffering, Affirmation of kendy, Coping skills reviewed/reinforced, Catharsis/review of pertinent events in supportive environment, Initial visit, Initial/Spiritual assessment, patient floor, Prayer (actual), Prayer (assurance of), Normalization of emotional/spiritual concerns, Life review/legacy           Plan of Care:     [] Support spiritual and/or cultural needs    [] Support AMD and/or advance care planning process      [] Support grieving process   [] Coordinate Rites and/or Rituals    [] Coordination with community clergy   [] No spiritual needs identified at this time   [] Detailed Plan of Care below (See Comments)  [] Make referral to Music Therapy  [] Make referral to Pet Therapy     [] Make referral to Addiction services  [] Make referral to [facility]  [] Make referral to Spiritual Care Partner  [] No future visits requested        [x] Follow up upon further referrals     Comments:     Initial Spiritual Care Assessment visit for Mr. Donna Hutson in 2221. Patient was alert, no family was present. Pt expressed constant pain on his foot, shared about his medical journey and kendy journey as [facility] prayed for his safe transportation to [facility] and painless healing. He was thankful for 's visit.         [facility], MJaxon., M.S., Th.M.  Spiritual Care Provider   Paging Service [phone])

## 2021-07-01 NOTE — PROGRESS NOTES
Received message from patient's nurse stating / informing that    he is tearful and crying in pain saying the pain regimen is not working. we have tried ice, repositioning/elevation, and Tylenol in between the opioids and he is still in a lot of pain in his  left leg, he has such severe cellulitis that they want to amputate but the patient doesn't want that         Discussion / orders:    Fentanyl 25 mg iv x 1           Please note that this note was dictated using Dragon computer voice recognition software. Quite often unanticipated grammatical, syntax, homophones, and other interpretive errors are inadvertently transcribed by the computer software. Please disregard these errors. Please excuse any errors that have escaped final proofreading.

## 2021-07-02 LAB
ALBUMIN SERPL-MCNC: 2.1 G/DL (ref 3.5–5)
ALBUMIN/GLOB SERPL: 0.6 {RATIO} (ref 1.1–2.2)
ALP SERPL-CCNC: 115 U/L (ref 45–117)
ALT SERPL-CCNC: 16 U/L (ref 12–78)
ANION GAP SERPL CALC-SCNC: 4 MMOL/L (ref 5–15)
AST SERPL-CCNC: 21 U/L (ref 15–37)
BASOPHILS # BLD: 0 K/UL (ref 0–0.1)
BASOPHILS NFR BLD: 0 % (ref 0–1)
BILIRUB SERPL-MCNC: 0.2 MG/DL (ref 0.2–1)
BUN SERPL-MCNC: 7 MG/DL (ref 6–20)
BUN/CREAT SERPL: 18 (ref 12–20)
CALCIUM SERPL-MCNC: 8.6 MG/DL (ref 8.5–10.1)
CHLORIDE SERPL-SCNC: 102 MMOL/L (ref 97–108)
CO2 SERPL-SCNC: 27 MMOL/L (ref 21–32)
CREAT SERPL-MCNC: 0.39 MG/DL (ref 0.7–1.3)
DIFFERENTIAL METHOD BLD: ABNORMAL
EOSINOPHIL # BLD: 0.6 K/UL (ref 0–0.4)
EOSINOPHIL NFR BLD: 6 % (ref 0–7)
ERYTHROCYTE [DISTWIDTH] IN BLOOD BY AUTOMATED COUNT: 15.7 % (ref 11.5–14.5)
GLOBULIN SER CALC-MCNC: 3.4 G/DL (ref 2–4)
GLUCOSE SERPL-MCNC: 81 MG/DL (ref 65–100)
HCT VFR BLD AUTO: 30 % (ref 36.6–50.3)
HGB BLD-MCNC: 9.7 G/DL (ref 12.1–17)
IMM GRANULOCYTES # BLD AUTO: 0.1 K/UL (ref 0–0.04)
IMM GRANULOCYTES NFR BLD AUTO: 1 % (ref 0–0.5)
LYMPHOCYTES # BLD: 1.7 K/UL (ref 0.8–3.5)
LYMPHOCYTES NFR BLD: 18 % (ref 12–49)
MCH RBC QN AUTO: 29.1 PG (ref 26–34)
MCHC RBC AUTO-ENTMCNC: 32.3 G/DL (ref 30–36.5)
MCV RBC AUTO: 90.1 FL (ref 80–99)
MONOCYTES # BLD: 1 K/UL (ref 0–1)
MONOCYTES NFR BLD: 10 % (ref 5–13)
NEUTS SEG # BLD: 6.3 K/UL (ref 1.8–8)
NEUTS SEG NFR BLD: 65 % (ref 32–75)
NRBC # BLD: 0 K/UL (ref 0–0.01)
NRBC BLD-RTO: 0 PER 100 WBC
PLATELET # BLD AUTO: 339 K/UL (ref 150–400)
PMV BLD AUTO: 8.6 FL (ref 8.9–12.9)
POTASSIUM SERPL-SCNC: 4.3 MMOL/L (ref 3.5–5.1)
PROT SERPL-MCNC: 5.5 G/DL (ref 6.4–8.2)
RBC # BLD AUTO: 3.33 M/UL (ref 4.1–5.7)
SODIUM SERPL-SCNC: 133 MMOL/L (ref 136–145)
WBC # BLD AUTO: 9.7 K/UL (ref 4.1–11.1)

## 2021-07-02 PROCEDURE — 80053 COMPREHEN METABOLIC PANEL: CPT

## 2021-07-02 PROCEDURE — 74011250637 HC RX REV CODE- 250/637: Performed by: STUDENT IN AN ORGANIZED HEALTH CARE EDUCATION/TRAINING PROGRAM

## 2021-07-02 PROCEDURE — 74011250636 HC RX REV CODE- 250/636: Performed by: HOSPITALIST

## 2021-07-02 PROCEDURE — 74011000258 HC RX REV CODE- 258: Performed by: STUDENT IN AN ORGANIZED HEALTH CARE EDUCATION/TRAINING PROGRAM

## 2021-07-02 PROCEDURE — 74011250637 HC RX REV CODE- 250/637: Performed by: HOSPITALIST

## 2021-07-02 PROCEDURE — 85025 COMPLETE CBC W/AUTO DIFF WBC: CPT

## 2021-07-02 PROCEDURE — 74011250637 HC RX REV CODE- 250/637: Performed by: INTERNAL MEDICINE

## 2021-07-02 PROCEDURE — 65270000029 HC RM PRIVATE

## 2021-07-02 PROCEDURE — 74011000258 HC RX REV CODE- 258: Performed by: EMERGENCY MEDICINE

## 2021-07-02 PROCEDURE — 94760 N-INVAS EAR/PLS OXIMETRY 1: CPT

## 2021-07-02 PROCEDURE — 77030041076 HC DRSG AG OPTICELL MDII -A

## 2021-07-02 PROCEDURE — 74011000250 HC RX REV CODE- 250: Performed by: STUDENT IN AN ORGANIZED HEALTH CARE EDUCATION/TRAINING PROGRAM

## 2021-07-02 PROCEDURE — 74011250636 HC RX REV CODE- 250/636: Performed by: EMERGENCY MEDICINE

## 2021-07-02 PROCEDURE — 74011250636 HC RX REV CODE- 250/636: Performed by: STUDENT IN AN ORGANIZED HEALTH CARE EDUCATION/TRAINING PROGRAM

## 2021-07-02 PROCEDURE — 74011250637 HC RX REV CODE- 250/637: Performed by: NURSE PRACTITIONER

## 2021-07-02 PROCEDURE — 36415 COLL VENOUS BLD VENIPUNCTURE: CPT

## 2021-07-02 PROCEDURE — 2709999900 HC NON-CHARGEABLE SUPPLY

## 2021-07-02 RX ORDER — ALLOPURINOL 100 MG/1
100 TABLET ORAL DAILY
Status: DISCONTINUED | OUTPATIENT
Start: 2021-07-03 | End: 2021-07-15 | Stop reason: HOSPADM

## 2021-07-02 RX ORDER — ALLOPURINOL 100 MG/1
100 TABLET ORAL 3 TIMES DAILY
Status: DISCONTINUED | OUTPATIENT
Start: 2021-07-02 | End: 2021-07-02

## 2021-07-02 RX ADMIN — TAMSULOSIN HYDROCHLORIDE 0.4 MG: 0.4 CAPSULE ORAL at 08:50

## 2021-07-02 RX ADMIN — MEROPENEM 500 MG: 500 INJECTION, POWDER, FOR SOLUTION INTRAVENOUS at 12:02

## 2021-07-02 RX ADMIN — LINEZOLID 600 MG: 600 INJECTION, SOLUTION INTRAVENOUS at 20:50

## 2021-07-02 RX ADMIN — LIDOCAINE: 40 CREAM TOPICAL at 13:52

## 2021-07-02 RX ADMIN — GABAPENTIN 100 MG: 100 CAPSULE ORAL at 08:50

## 2021-07-02 RX ADMIN — SODIUM CHLORIDE 10 ML: 9 INJECTION, SOLUTION INTRAMUSCULAR; INTRAVENOUS; SUBCUTANEOUS at 12:05

## 2021-07-02 RX ADMIN — GABAPENTIN 100 MG: 100 CAPSULE ORAL at 15:52

## 2021-07-02 RX ADMIN — FAMOTIDINE 20 MG: 20 TABLET ORAL at 17:07

## 2021-07-02 RX ADMIN — HYDROMORPHONE HYDROCHLORIDE 4 MG: 2 TABLET ORAL at 14:02

## 2021-07-02 RX ADMIN — COLCHICINE 0.6 MG: 0.6 TABLET, FILM COATED ORAL at 05:03

## 2021-07-02 RX ADMIN — COLCHICINE 0.6 MG: 0.6 TABLET, FILM COATED ORAL at 15:55

## 2021-07-02 RX ADMIN — DIPHENHYDRAMINE HYDROCHLORIDE 25 MG: 25 CAPSULE ORAL at 09:30

## 2021-07-02 RX ADMIN — OXYCODONE 10 MG: 5 TABLET ORAL at 22:31

## 2021-07-02 RX ADMIN — OXYCODONE 10 MG: 5 TABLET ORAL at 17:10

## 2021-07-02 RX ADMIN — GABAPENTIN 100 MG: 100 CAPSULE ORAL at 20:50

## 2021-07-02 RX ADMIN — ENOXAPARIN SODIUM 40 MG: 40 INJECTION SUBCUTANEOUS at 08:48

## 2021-07-02 RX ADMIN — Medication 1 CAPSULE: at 08:49

## 2021-07-02 RX ADMIN — METHADONE HYDROCHLORIDE 140 MG: 10 CONCENTRATE ORAL at 08:51

## 2021-07-02 RX ADMIN — MEROPENEM 500 MG: 500 INJECTION, POWDER, FOR SOLUTION INTRAVENOUS at 01:42

## 2021-07-02 RX ADMIN — OXYCODONE 10 MG: 5 TABLET ORAL at 05:05

## 2021-07-02 RX ADMIN — SODIUM CHLORIDE 10 ML: 9 INJECTION, SOLUTION INTRAMUSCULAR; INTRAVENOUS; SUBCUTANEOUS at 06:39

## 2021-07-02 RX ADMIN — HYDROMORPHONE HYDROCHLORIDE 4 MG: 2 TABLET ORAL at 20:25

## 2021-07-02 RX ADMIN — OXYCODONE 10 MG: 5 TABLET ORAL at 12:04

## 2021-07-02 RX ADMIN — DOCUSATE SODIUM 50MG AND SENNOSIDES 8.6MG 1 TABLET: 8.6; 5 TABLET, FILM COATED ORAL at 08:49

## 2021-07-02 RX ADMIN — SODIUM CHLORIDE 10 ML: 9 INJECTION, SOLUTION INTRAMUSCULAR; INTRAVENOUS; SUBCUTANEOUS at 22:33

## 2021-07-02 RX ADMIN — MEROPENEM 500 MG: 500 INJECTION, POWDER, FOR SOLUTION INTRAVENOUS at 17:07

## 2021-07-02 RX ADMIN — HYDROMORPHONE HYDROCHLORIDE 4 MG: 2 TABLET ORAL at 07:24

## 2021-07-02 RX ADMIN — OXYCODONE 10 MG: 5 TABLET ORAL at 00:18

## 2021-07-02 RX ADMIN — ASPIRIN 81 MG: 81 TABLET, CHEWABLE ORAL at 08:50

## 2021-07-02 RX ADMIN — ALLOPURINOL 100 MG: 100 TABLET ORAL at 08:49

## 2021-07-02 RX ADMIN — HYDROMORPHONE HYDROCHLORIDE 4 MG: 2 TABLET ORAL at 01:44

## 2021-07-02 RX ADMIN — LEVOTHYROXINE SODIUM 125 MCG: 0.12 TABLET ORAL at 06:39

## 2021-07-02 RX ADMIN — LINEZOLID 600 MG: 600 INJECTION, SOLUTION INTRAVENOUS at 10:01

## 2021-07-02 RX ADMIN — FAMOTIDINE 20 MG: 20 TABLET ORAL at 08:50

## 2021-07-02 RX ADMIN — MEROPENEM 500 MG: 500 INJECTION, POWDER, FOR SOLUTION INTRAVENOUS at 06:39

## 2021-07-02 RX ADMIN — ACETAMINOPHEN 650 MG: 325 TABLET ORAL at 06:41

## 2021-07-02 NOTE — PROGRESS NOTES
Message left for Justine Munoz, wound care re: Wound care orders? Re-dressed today and noted xeroform gauze on wound, not in original order. Please clarify.

## 2021-07-02 NOTE — PROGRESS NOTES
End of Shift Note    Bedside shift change report given to Cristy Gunn RN (oncoming nurse) by All Alvares RN (offgoing nurse). Report included the following information SBAR, Kardex, Intake/Output, MAR and Recent Results    Shift worked:  7p-7a     Shift summary and any significant changes:     Transferred to unit from [facility]. Pt not tolerating pain well with current prn meds available. Dressing remained CDI. Concerns for physician to address:  Pain     Zone phone for oncoming shift:   [phone]       Activity:  Activity Level: Up with Assistance  Number times ambulated in hallways past shift: 0  Number of times OOB to chair past shift: 1    Cardiac:   Cardiac Monitoring: Not applicable      Cardiac Rhythm: Sinus Rhythm    Access:   Current line(s): PIV     Genitourinary:   Urinary status: voiding    Respiratory:   O2 Device: None (Room air)  Chronic home O2 use?: N/A  Incentive spirometer at bedside: N/A     GI:  Last Bowel Movement Date: 06/30/21  Current diet:  ADULT DIET Regular  Passing flatus: YES  Tolerating current diet: YES       Pain Management:   Patient states pain is manageable on current regimen: NO    Skin:  Dejuan Score: 18  Interventions: float heels, increase time out of bed and limit briefs    Patient Safety:  Fall Score:  Total Score: 4  Interventions: bed/chair alarm, assistive device (walker, cane, etc), gripper socks, pt to call before getting OOB and stay with me (per policy)  High Fall Risk: Yes    Length of Stay:  Expected LOS: 3d 12h  Actual LOS: 7      All Alvares RN

## 2021-07-02 NOTE — PROGRESS NOTES
Hospitalist Progress Note    NAME: Leif Hong   :  1965   MRN:  [id number]       Assessment / Plan:  Severe sepsis POA resolved  Left leg necrotic ulcer due to prior trauma and pyoderma gangrenosum  Intractable leg pain  -has been following with Dr Katelyn Griffith (derm) since 2020 and has tried multiple courses of oral steroids. May need cyclosporin or humira. Had wound debridement at [facility] in  which may have worsened the wounds in retrospect.      -Patient needs formal evaluation in an academic center. His current social situation has remained a barrier for consistent follow up, wound care or subspecialty consultation. Patient has agreed to go to a SNF or NH if needed.      -Continue meropenem and Zyvox   -Continue wound care  -continue dilaudid IV for pain. Already on high dose of methadone. Pain is what he brings up all the time. His situation has gotten worse over the past year. He does not want any amputation but he wants to get better and pursue [facility] transfer if needed. I will ask palliative care to help with emotional support and pain med adjustments    -called [facility] transfer center  and spoke with triage MD.  [facility] is on Tier 1 and will only accept transfers for cardiac or strokes. Will call back on  and . If they have no beds by next week, will need to come up with another plan. GI upset from recent nsaid use  GERD  --pepcid bid     HTN  --continue lisinopril     Gout flare, left hand  -continue allopurinol.   -patient can use colchicine 0.6mg prn     Hypothyroid  --continue levothyroxine    Hx bladder cancer  Hx stroke from aneurysm with left sided weakness  --continue aspirin    Chronic methadone use: Continue home dose methadone     Obesity: BMI 31. Counseling about lifestyle modification provided        Body mass index is 31.66 kg/m².   Estimated discharge date:  once okay with ID  Barriers: Pending PT evaluation    Code status: Full  Prophylaxis: Lovenox  Recommended Disposition: TBD                 Subjective:     Chief Complaint / Reason for Physician Visit  Follow up of sepsis, PG, gout  Pain is his number one issue today. Review of Systems:  Symptom Y/N Comments  Symptom Y/N Comments   Fever/Chills    Chest Pain     Poor Appetite    Edema     Cough    Abdominal Pain     Sputum    Joint Pain     SOB/BECK    Pruritis/Rash     Nausea/vomit    Tolerating PT/OT     Diarrhea    Tolerating Diet     Constipation    Other       Could NOT obtain due to:      Objective:     VITALS:   Last 24hrs VS reviewed since prior progress note. Most recent are:  Patient Vitals for the past 24 hrs:   Temp Pulse Resp BP SpO2   07/02/21 1434 97.7 °F (36.5 °C) 65 18 101/64 98 %   07/02/21 1206 97.6 °F (36.4 °C) 65 17 120/88 97 %   07/02/21 0846 98.1 °F (36.7 °C) 61 16 106/63 98 %   07/02/21 0257 98.2 °F (36.8 °C) 65 18 116/85 97 %   07/01/21 2240 98.2 °F (36.8 °C) 68 16 (!) 141/96 98 %   07/01/21 2117 98.1 °F (36.7 °C) 75 14 (!) 133/93 96 %   07/01/21 2016 98.4 °F (36.9 °C) 74 10 101/83 97 %   07/01/21 1620 -- 84 -- (!) 140/91 --       Intake/Output Summary (Last 24 hours) at 7/2/2021 1551  Last data filed at 7/2/2021 1326  Gross per 24 hour   Intake 2350 ml   Output 2250 ml   Net 100 ml        I had a face to face encounter and independently examined this patient on 7/2/2021, as outlined below:    PHYSICAL EXAM:    General: WD, WN. Alert, cooperative, no acute distress    EENT:  EOMI. Anicteric sclerae. MMM  Resp:  CTA bilaterally, no wheezing or rales. No accessory muscle use  CV:  Regular  rhythm,  No edema  GI:  Soft, Non distended, Non tender. +Bowel sounds  Neurologic:  Alert and oriented X 3, normal speech,   Psych:   Good insight.  Not anxious nor agitated  Skin:  Left leg: Large deep circumferential ulcer involving almost all skin below the mid sheen to the forefoot, yellowish fibrinous necrotic base with foul smelling odor, rolled out ulcer edge with minimal surrounding erythema      Reviewed most current lab test results and cultures  YES  Reviewed most current radiology test results   YES  Review and summation of old records today    NO  Reviewed patient's current orders and MAR    YES  PMH/SH reviewed - no change compared to H&P  ________________________________________________________________________  Care Plan discussed with:    Comments   Patient x    Family      RN x    Care Manager     Consultant                        Multidiciplinary team rounds were held today with , nursing, pharmacist and clinical coordinator. Patient's plan of care was discussed; medications were reviewed and discharge planning was addressed. ________________________________________________________________________  Total NON critical care TIME:  40   Minutes    Total CRITICAL CARE TIME Spent:   Minutes non procedure based      Comments   >50% of visit spent in counseling and coordination of care x    ________________________________________________________________________  Rafiq Bray MD     Procedures: see electronic medical records for all procedures/Xrays and details which were not copied into this note but were reviewed prior to creation of Plan. LABS:  I reviewed today's most current labs and imaging studies.   Pertinent labs include:  Recent Labs     07/02/21 0348 06/30/21  0403   WBC 9.7 9.7   HGB 9.7* 9.5*   HCT 30.0* 29.5*    352     Recent Labs     07/02/21 0348 06/30/21  0403   * 133*   K 4.3 4.2    105   CO2 27 24   GLU 81 94   BUN 7 5*   CREA 0.39* 0.46*   CA 8.6 8.2*   ALB 2.1*  --    TBILI 0.2  --    ALT 16  --        Signed: Rafiq Bray MD

## 2021-07-02 NOTE — PROGRESS NOTES
Initial Palliative Care Assessment visit for Mr. Henry in 2142. Pt was alert, no family was present. Since  is aquainted with him,  checked with him how his pain level is going. He said nothing is changed since last time, still waiting for the transfer to the [facility]. Pt expressed constant pain on his foot, did not get enough sleep due to pain.  empathized his suffering, emphasized good meal and good sleep for the healing process. Pt asked about the local [facility] near this area,  talked about [facility] in [place]. Advised of  availability. Briefly discussed about plan of care with RN.     [id number] Nae Monahan., M.S., Th.M.  Spiritual Care Provider   Paging Service [phone]

## 2021-07-02 NOTE — PROGRESS NOTES
End of Shift Note    Bedside shift change report given to Angela Belcher RN (oncoming nurse) by Francisco Velazquez RN (offgoing nurse). Report included the following information SBAR, Kardex, Intake/Output, MAR and Recent Results    Shift worked:  7a-7p     Shift summary and any significant changes:     Wound care completed, pt's way. Refused to let nurse do wound care as ordered. Grisel Doss from wound care would like wound care done as ordered. Concerns for physician to address:      Zone phone for oncoming shift:   [phone]       Activity:  Activity Level: Dangle Side of Bed  Number times ambulated in hallways past shift: 0  Number of times OOB to chair past shift: 1, up to side of bed  All meals    Cardiac:   Cardiac Monitoring: Not applicable      Cardiac Rhythm: Sinus Rhythm    Access:   Current line(s): PIV     Genitourinary:   Urinary status: voiding    Respiratory:   O2 Device: None (Room air)  Chronic home O2 use?: N/A  Incentive spirometer at bedside: N/A     GI:  Last Bowel Movement Date: 07/01/21  Current diet:  ADULT DIET Regular  Passing flatus: YES  Tolerating current diet: YES       Pain Management:   Patient states pain is manageable on current regimen: NO    Skin:  Dejuan Score: 19  Interventions: float heels, increase time out of bed and limit briefs    Patient Safety:  Fall Score:  Total Score: 3  Interventions: bed/chair alarm, assistive device (walker, cane, etc), gripper socks, pt to call before getting OOB and stay with me (per policy)  High Fall Risk: Yes    Length of Stay:  Expected LOS: 3d 12h  Actual LOS: 1200 [facility], RN

## 2021-07-02 NOTE — WOUND CARE
Wound Care was consulted for follow up on this patient but he is not currently available. Discussed the wound care with Dennis Suarez RN earlier today after she had already done the wound care. She attempted to do the wound care as ordered but the patient insisted on xeroform, which is what he does at home. I have printed him a copy of the wound care ordered so that nursing can show it to him. This will need to be done tomorrow as ordered. Recommend systemic steroids for this wound as it is Pyoderma Gangrenosum, which is usually treated with long term steroids for several weeks and then tapered off. Plan: Will see patient next week if he is still here on Wednesday.    Monique Ferrell RN, BSN, [employer]

## 2021-07-02 NOTE — PROGRESS NOTES
1900: Bedside shift change report given to Callum Hong (oncoming nurse) by 5 [facility] Mary RN (offgoing nurse). Report included the following information SBAR, ED Summary, Intake/Output, MAR, Recent Results and Cardiac Rhythm NSR.     2000: TRANSFER - OUT REPORT:    Verbal report given to [address] (name) on [facility]  being transferred to [facility] (unit) for routine progression of care       Report consisted of patients Situation, Background, Assessment and   Recommendations(SBAR). Information from the following report(s) SBAR, ED Summary, Intake/Output, MAR, Recent Results and Cardiac Rhythm NSR was reviewed with the receiving nurse. Lines:   Peripheral IV 06/27/21 Right;Upper Arm (Active)   Site Assessment Clean, dry, & intact 07/01/21 2016   Phlebitis Assessment 0 07/01/21 2016   Infiltration Assessment 0 07/01/21 2016   Dressing Status Clean, dry, & intact 07/01/21 2016   Dressing Type Tape;Transparent 07/01/21 2016   Hub Color/Line Status Blue;Capped 07/01/21 2016   Action Taken Open ports on tubing capped 07/01/21 2016   Alcohol Cap Used Yes 07/01/21 2016        Opportunity for questions and clarification was provided.       Patient transported with:   Monitor

## 2021-07-02 NOTE — PROGRESS NOTES
Comprehensive Nutrition Assessment    Type and Reason for Visit: Initial, RD nutrition re-screen/LOS    Nutrition Recommendations/Plan:   · Continue regular diet. · RD ordered Ensure High Protein po BID with meals. · Please document % meals and supplements consumed in flowsheet I/O's under intake. Nutrition Assessment:     7/2: Chart reviewed; med noted for severe sepsis, chronic nonhealing wound of the let lower extremity 2 years. Wound exposure to the bone. Pt is on high dose chronic pain meds and wants to exhaust all options prior to amputation. Possible transfer to [facility]. Palliative is assisting with pain management and goals of care. Patient Vitals for the past 168 hrs:   % Diet Eaten   07/02/21 1326 76 - 100%   07/01/21 1322 76 - 100%   07/01/21 0901 26 - 50%     Last Weight Metric  Weight Loss Metrics 7/2/2021 4/27/2021 12/11/2020 12/10/2020 10/21/2020 10/20/2020 9/9/2020   Today's Wt 251 lb 254 lb 9.6 oz 237 lb 14 oz - - 242 lb 235 lb   BMI 33.12 kg/m2 33.59 kg/m2 - 31.53 kg/m2 31.93 kg/m2 - 31 kg/m2   Some encounter information is confidential and restricted. Go to Review Flowsheets activity to see all data. Estimated Daily Nutrient Needs:  Energy (kcal): 2376 (BMR 2020 x 1. 3AF) - 250 kcals; Weight Used for Energy Requirements: Current  Protein (g): 135 (1.2 g/kg bw); Weight Used for Protein Requirements: Current  Fluid (ml/day): 2300 ml/day; Method Used for Fluid Requirements: 1 ml/kcal    Nutrition Related Findings:  BM: 7/1; Labs: reviewed; Meds: high does pain meds (methadone), dilaudid, bowel regimen      Wounds:      severe lower extremity wound/bone exposed/osteomyelitis    Current Nutrition Therapies:  ADULT DIET Regular  ADULT ORAL NUTRITION SUPPLEMENT Breakfast, Dinner; Low Calorie/High Protein    Anthropometric Measures:  · Height:  6' 1\" (185.4 cm)  · Current Body Wt:  113.9 kg (251 lb 1.7 oz)   · Ideal Body Wt:  184 lbs:  136.5 %    · BMI Category:  Obese class 1 (BMI 30.0-34. 9) Nutrition Diagnosis:   · Increased nutrient needs related to  (severe lower leg/foot wound with osteomyelitis) as evidenced by  (increased protein and energy needs to support wound healing)    Nutrition Interventions:   Food and/or Nutrient Delivery: Continue current diet, Start oral nutrition supplement  Nutrition Education and Counseling: No recommendations at this time  Coordination of Nutrition Care: Continue to monitor while inpatient    Goals:  PO intake >75% of meals + consume 240 ml ONS next 3-5 days       Nutrition Monitoring and Evaluation:   Behavioral-Environmental Outcomes: None identified  Food/Nutrient Intake Outcomes: Food and nutrient intake, Supplement intake  Physical Signs/Symptoms Outcomes: Biochemical data, Skin, Weight, GI status    Discharge Planning:     Too soon to determine     Electronically signed by Tali Desai RD on 7/2/2021 at 3:43 PM

## 2021-07-03 PROCEDURE — 77010033678 HC OXYGEN DAILY

## 2021-07-03 PROCEDURE — 77030041076 HC DRSG AG OPTICELL MDII -A

## 2021-07-03 PROCEDURE — 94760 N-INVAS EAR/PLS OXIMETRY 1: CPT

## 2021-07-03 PROCEDURE — 74011250637 HC RX REV CODE- 250/637: Performed by: INTERNAL MEDICINE

## 2021-07-03 PROCEDURE — 74011000258 HC RX REV CODE- 258: Performed by: STUDENT IN AN ORGANIZED HEALTH CARE EDUCATION/TRAINING PROGRAM

## 2021-07-03 PROCEDURE — 74011250637 HC RX REV CODE- 250/637: Performed by: HOSPITALIST

## 2021-07-03 PROCEDURE — 2709999900 HC NON-CHARGEABLE SUPPLY

## 2021-07-03 PROCEDURE — 74011250637 HC RX REV CODE- 250/637: Performed by: STUDENT IN AN ORGANIZED HEALTH CARE EDUCATION/TRAINING PROGRAM

## 2021-07-03 PROCEDURE — 74011250637 HC RX REV CODE- 250/637: Performed by: NURSE PRACTITIONER

## 2021-07-03 PROCEDURE — 65270000029 HC RM PRIVATE

## 2021-07-03 PROCEDURE — 74011250636 HC RX REV CODE- 250/636: Performed by: STUDENT IN AN ORGANIZED HEALTH CARE EDUCATION/TRAINING PROGRAM

## 2021-07-03 PROCEDURE — 74011250636 HC RX REV CODE- 250/636: Performed by: HOSPITALIST

## 2021-07-03 PROCEDURE — 74011000250 HC RX REV CODE- 250: Performed by: INTERNAL MEDICINE

## 2021-07-03 RX ORDER — GABAPENTIN 300 MG/1
300 CAPSULE ORAL
Status: COMPLETED | OUTPATIENT
Start: 2021-07-04 | End: 2021-07-04

## 2021-07-03 RX ORDER — GABAPENTIN 300 MG/1
300 CAPSULE ORAL 3 TIMES DAILY
Status: DISCONTINUED | OUTPATIENT
Start: 2021-07-04 | End: 2021-07-15 | Stop reason: HOSPADM

## 2021-07-03 RX ORDER — BUMETANIDE 0.25 MG/ML
1 INJECTION INTRAMUSCULAR; INTRAVENOUS 2 TIMES DAILY
Status: DISCONTINUED | OUTPATIENT
Start: 2021-07-03 | End: 2021-07-05

## 2021-07-03 RX ORDER — FENTANYL CITRATE 50 UG/ML
25 INJECTION, SOLUTION INTRAMUSCULAR; INTRAVENOUS ONCE
Status: COMPLETED | OUTPATIENT
Start: 2021-07-04 | End: 2021-07-04

## 2021-07-03 RX ADMIN — LINEZOLID 600 MG: 600 INJECTION, SOLUTION INTRAVENOUS at 08:58

## 2021-07-03 RX ADMIN — MEROPENEM 500 MG: 500 INJECTION, POWDER, FOR SOLUTION INTRAVENOUS at 17:07

## 2021-07-03 RX ADMIN — COLCHICINE 0.6 MG: 0.6 TABLET, FILM COATED ORAL at 21:39

## 2021-07-03 RX ADMIN — HYDROMORPHONE HYDROCHLORIDE 4 MG: 2 TABLET ORAL at 20:17

## 2021-07-03 RX ADMIN — COLCHICINE 0.6 MG: 0.6 TABLET, FILM COATED ORAL at 08:52

## 2021-07-03 RX ADMIN — LEVOTHYROXINE SODIUM 125 MCG: 0.12 TABLET ORAL at 05:09

## 2021-07-03 RX ADMIN — ASPIRIN 81 MG: 81 TABLET, CHEWABLE ORAL at 08:50

## 2021-07-03 RX ADMIN — SODIUM CHLORIDE 10 ML: 9 INJECTION, SOLUTION INTRAMUSCULAR; INTRAVENOUS; SUBCUTANEOUS at 21:40

## 2021-07-03 RX ADMIN — HYDROMORPHONE HYDROCHLORIDE 4 MG: 2 TABLET ORAL at 00:33

## 2021-07-03 RX ADMIN — LINEZOLID 600 MG: 600 INJECTION, SOLUTION INTRAVENOUS at 21:40

## 2021-07-03 RX ADMIN — GABAPENTIN 100 MG: 100 CAPSULE ORAL at 21:40

## 2021-07-03 RX ADMIN — MEROPENEM 500 MG: 500 INJECTION, POWDER, FOR SOLUTION INTRAVENOUS at 05:09

## 2021-07-03 RX ADMIN — ENOXAPARIN SODIUM 40 MG: 40 INJECTION SUBCUTANEOUS at 08:54

## 2021-07-03 RX ADMIN — MEROPENEM 500 MG: 500 INJECTION, POWDER, FOR SOLUTION INTRAVENOUS at 11:50

## 2021-07-03 RX ADMIN — OXYCODONE 10 MG: 5 TABLET ORAL at 17:10

## 2021-07-03 RX ADMIN — ACETAMINOPHEN 650 MG: 325 TABLET ORAL at 01:40

## 2021-07-03 RX ADMIN — METHADONE HYDROCHLORIDE 140 MG: 10 CONCENTRATE ORAL at 08:48

## 2021-07-03 RX ADMIN — FAMOTIDINE 20 MG: 20 TABLET ORAL at 17:10

## 2021-07-03 RX ADMIN — BUMETANIDE 1 MG: 0.25 INJECTION INTRAMUSCULAR; INTRAVENOUS at 08:58

## 2021-07-03 RX ADMIN — BUMETANIDE 1 MG: 0.25 INJECTION INTRAMUSCULAR; INTRAVENOUS at 17:08

## 2021-07-03 RX ADMIN — Medication 1 CAPSULE: at 08:50

## 2021-07-03 RX ADMIN — MEROPENEM 500 MG: 500 INJECTION, POWDER, FOR SOLUTION INTRAVENOUS at 00:33

## 2021-07-03 RX ADMIN — DOCUSATE SODIUM 50MG AND SENNOSIDES 8.6MG 1 TABLET: 8.6; 5 TABLET, FILM COATED ORAL at 08:50

## 2021-07-03 RX ADMIN — DIPHENHYDRAMINE HYDROCHLORIDE 25 MG: 25 CAPSULE ORAL at 11:56

## 2021-07-03 RX ADMIN — HYDROMORPHONE HYDROCHLORIDE 4 MG: 2 TABLET ORAL at 13:32

## 2021-07-03 RX ADMIN — OXYCODONE 10 MG: 5 TABLET ORAL at 11:53

## 2021-07-03 RX ADMIN — MEROPENEM 500 MG: 500 INJECTION, POWDER, FOR SOLUTION INTRAVENOUS at 23:00

## 2021-07-03 RX ADMIN — FAMOTIDINE 20 MG: 20 TABLET ORAL at 08:50

## 2021-07-03 RX ADMIN — TAMSULOSIN HYDROCHLORIDE 0.4 MG: 0.4 CAPSULE ORAL at 08:51

## 2021-07-03 RX ADMIN — SODIUM CHLORIDE 10 ML: 9 INJECTION, SOLUTION INTRAMUSCULAR; INTRAVENOUS; SUBCUTANEOUS at 11:53

## 2021-07-03 RX ADMIN — HYDROMORPHONE HYDROCHLORIDE 4 MG: 2 TABLET ORAL at 08:51

## 2021-07-03 RX ADMIN — OXYCODONE 10 MG: 5 TABLET ORAL at 22:50

## 2021-07-03 RX ADMIN — SODIUM CHLORIDE 10 ML: 9 INJECTION, SOLUTION INTRAMUSCULAR; INTRAVENOUS; SUBCUTANEOUS at 05:13

## 2021-07-03 RX ADMIN — GABAPENTIN 100 MG: 100 CAPSULE ORAL at 08:50

## 2021-07-03 RX ADMIN — GABAPENTIN 100 MG: 100 CAPSULE ORAL at 15:36

## 2021-07-03 RX ADMIN — Medication 3 MG: at 22:50

## 2021-07-03 RX ADMIN — OXYCODONE 10 MG: 5 TABLET ORAL at 05:09

## 2021-07-03 NOTE — PROGRESS NOTES
made a follow-up visit for Mr. Henyr in 2142. Pt was still expressing pain,  provided attentive listening. Pt was having dinner, letting him finish it. Pt was thankful for 's checking in.     [id number] Nemo Monahan, M.S., Th.M.  Spiritual Care Provider   Paging Service [phone]

## 2021-07-03 NOTE — PROGRESS NOTES
Hospitalist Progress Note    NAME: Elizabeth Garcia   :  1965   MRN:  [id number]       Assessment / Plan:  Severe sepsis POA resolved  Left leg necrotic ulcer due to prior trauma and pyoderma gangrenosum  Intractable leg pain  -has been following with Dr Abdiel Call (derm) since 2020 and has tried multiple courses of oral steroids. May need cyclosporin or humira. Had wound debridement at [facility] in  which may have worsened the wounds in retrospect.      -Patient needs formal evaluation in an academic center. His current social situation has remained a barrier for consistent follow up, wound care or subspecialty consultation. Patient has agreed to go to a SNF or NH if needed.      -Continue meropenem and Zyvox   -Continue wound care  -continue dilaudid IV for pain. Already on high dose of methadone. Pain is what he brings up all the time. His situation has gotten worse over the past year. He does not want any amputation but he wants to get better and pursue [facility] transfer if needed. I will ask palliative care to help with emotional support and pain med adjustments    -called [facility] transfer center  and spoke with triage MD.  [facility] is on Tier 1 and will only accept transfers for cardiac or strokes. Will call back on  and . If they have no beds by next week, will need to come up with another plan. LUE swelling   Hx Gout  -LUE swelling is new since admission. Check Duplex  -He feels his LUE pain is from gout but his exam is not consistent with this and colchicine BID (he wants TID) has not been helping.   -will start diuretics. His low albumin is not helping, legs swollen as well. GERD  -pepcid bid     HTN  -continue lisinopril     Hypothyroid  -continue levothyroxine    Hx bladder cancer  Hx stroke from aneurysm with left sided weakness  -continue aspirin    Chronic methadone use: Continue home dose methadone     Obesity: BMI 31.  Counseling about lifestyle modification provided        Body mass index is 31.66 kg/m². Estimated discharge date: 7/1 once okay with ID  Barriers: Pending PT evaluation    Code status: Full  Prophylaxis: Lovenox  Recommended Disposition: TBD               Subjective:     Chief Complaint / Reason for Physician Visit  Follow up of sepsis, PG, gout  L arm pain swelling not better    Review of Systems:  Symptom Y/N Comments  Symptom Y/N Comments   Fever/Chills    Chest Pain     Poor Appetite    Edema y    Cough    Abdominal Pain     Sputum    Joint Pain     SOB/BECK    Pruritis/Rash     Nausea/vomit    Tolerating PT/OT     Diarrhea    Tolerating Diet     Constipation    Other       Could NOT obtain due to:      Objective:     VITALS:   Last 24hrs VS reviewed since prior progress note. Most recent are:  Patient Vitals for the past 24 hrs:   Temp Pulse Resp BP SpO2   07/03/21 0738 -- 66 -- 103/71 --   07/03/21 0734 97.7 °F (36.5 °C) 73 18 97/73 93 %   07/03/21 0347 98.2 °F (36.8 °C) 75 18 134/77 95 %   07/02/21 2347 98.2 °F (36.8 °C) 72 18 123/88 97 %   07/02/21 2038 97.7 °F (36.5 °C) 72 18 117/86 99 %   07/02/21 1949 98 °F (36.7 °C) 70 18 121/80 100 %   07/02/21 1434 97.7 °F (36.5 °C) 65 18 101/64 98 %   07/02/21 1206 97.6 °F (36.4 °C) 65 17 120/88 97 %       Intake/Output Summary (Last 24 hours) at 7/3/2021 1001  Last data filed at 7/3/2021 0855  Gross per 24 hour   Intake 1170 ml   Output 2175 ml   Net -1005 ml        I had a face to face encounter and independently examined this patient on 7/3/2021, as outlined below:    PHYSICAL EXAM:    General: WD, WN. Alert, cooperative, no acute distress    EENT:  EOMI. Anicteric sclerae. MMM  Resp:  CTA bilaterally, no wheezing or rales. No accessory muscle use  CV:  Regular  rhythm, LUE swelling and BLE edema  GI:  Soft, Non distended, Non tender. +Bowel sounds  Neurologic:  Alert and oriented X 3, normal speech,   Psych:   Good insight.  Not anxious nor agitated  Skin:  Left leg: Large deep circumferential ulcer involving almost all skin below the mid sheen to the forefoot, yellowish fibrinous necrotic base with foul smelling odor, rolled out ulcer edge with minimal surrounding erythema      Reviewed most current lab test results and cultures  YES  Reviewed most current radiology test results   YES  Review and summation of old records today    NO  Reviewed patient's current orders and MAR    YES  PMH/ reviewed - no change compared to H&P  ________________________________________________________________________  Care Plan discussed with:    Comments   Patient x    Family      RN x    Care Manager     Consultant                        Multidiciplinary team rounds were held today with , nursing, pharmacist and clinical coordinator. Patient's plan of care was discussed; medications were reviewed and discharge planning was addressed. ________________________________________________________________________  Total NON critical care TIME:  15   Minutes    Total CRITICAL CARE TIME Spent:   Minutes non procedure based      Comments   >50% of visit spent in counseling and coordination of care x    ________________________________________________________________________  Daryl Alamo MD     Procedures: see electronic medical records for all procedures/Xrays and details which were not copied into this note but were reviewed prior to creation of Plan. LABS:  I reviewed today's most current labs and imaging studies.   Pertinent labs include:  Recent Labs     07/02/21 0348   WBC 9.7   HGB 9.7*   HCT 30.0*        Recent Labs     07/02/21  0348   *   K 4.3      CO2 27   GLU 81   BUN 7   CREA 0.39*   CA 8.6   ALB 2.1*   TBILI 0.2   ALT 16       Signed: Daryl Alamo MD

## 2021-07-03 NOTE — PROGRESS NOTES
End of Shift Note    Bedside shift change report given to EMERY Wood (oncoming nurse) by Keanu Matos RN (offgoing nurse). Report included the following information SBAR, Kardex, Intake/Output, MAR and Recent Results    Shift worked:  7a-7p     Shift summary and any significant changes:     Doppler study for left arm due to swelling, not done yet.,  Bumex given today. Wound care done as ordered. Concerns for physician to address:      Zone phone for oncoming shift:   [phone]       Activity:  Activity Level: Dangle Side of Bed  Number times ambulated in hallways past shift: 0  Number of times OOB to chair past shift: 1, up to side of bed  All meals. Once up to bathroom via wheelchair    Cardiac:   Cardiac Monitoring: Not applicable      Cardiac Rhythm: Sinus Rhythm    Access:   Current line(s): PIV     Genitourinary:   Urinary status: voiding    Respiratory:   O2 Device: None (Room air)  Chronic home O2 use?: N/A  Incentive spirometer at bedside: N/A     GI:  Last Bowel Movement Date: 07/01/21  Current diet:  ADULT DIET Regular  ADULT ORAL NUTRITION SUPPLEMENT Breakfast, Dinner; Low Calorie/High Protein  Passing flatus: YES  Tolerating current diet: YES       Pain Management:   Patient states pain is manageable on current regimen: NO    Skin:  Dejuan Score: 20  Interventions: float heels, increase time out of bed and limit briefs    Patient Safety:  Fall Score:  Total Score: 3  Interventions: bed/chair alarm, assistive device (walker, cane, etc), gripper socks, pt to call before getting OOB and stay with me (per policy)  High Fall Risk: Yes    Length of Stay:  Expected LOS: 3d 12h  Actual LOS: [address], RN

## 2021-07-03 NOTE — PROGRESS NOTES
Bedside and Verbal shift change report given to Lexx Espinoza RN (oncoming nurse) by [facility], RN (offgoing nurse). Report included the following information SBAR, Kardex, Intake/Output, MAR and Recent Results.

## 2021-07-03 NOTE — PROGRESS NOTES
Problem: Falls - Risk of  Goal: *Absence of Falls  Description: Document Alberto Aleman Fall Risk and appropriate interventions in the flowsheet.   Outcome: Progressing Towards Goal  Note: Fall Risk Interventions:  Mobility Interventions: Assess mobility with egress test, Patient to call before getting OOB, Utilize walker, cane, or other assistive device         Medication Interventions: Patient to call before getting OOB, Teach patient to arise slowly    Elimination Interventions: Call light in reach, Urinal in reach    History of Falls Interventions: Door open when patient unattended

## 2021-07-04 LAB
ANION GAP SERPL CALC-SCNC: 7 MMOL/L (ref 5–15)
BNP SERPL-MCNC: 68 PG/ML
BUN SERPL-MCNC: 9 MG/DL (ref 6–20)
BUN/CREAT SERPL: 23 (ref 12–20)
CALCIUM SERPL-MCNC: 7.8 MG/DL (ref 8.5–10.1)
CHLORIDE SERPL-SCNC: 97 MMOL/L (ref 97–108)
CO2 SERPL-SCNC: 30 MMOL/L (ref 21–32)
CREAT SERPL-MCNC: 0.39 MG/DL (ref 0.7–1.3)
GLUCOSE SERPL-MCNC: 101 MG/DL (ref 65–100)
MAGNESIUM SERPL-MCNC: 1.6 MG/DL (ref 1.6–2.4)
PHOSPHATE SERPL-MCNC: 4.6 MG/DL (ref 2.6–4.7)
POTASSIUM SERPL-SCNC: 4.3 MMOL/L (ref 3.5–5.1)
SODIUM SERPL-SCNC: 134 MMOL/L (ref 136–145)

## 2021-07-04 PROCEDURE — 74011250636 HC RX REV CODE- 250/636: Performed by: STUDENT IN AN ORGANIZED HEALTH CARE EDUCATION/TRAINING PROGRAM

## 2021-07-04 PROCEDURE — 94760 N-INVAS EAR/PLS OXIMETRY 1: CPT

## 2021-07-04 PROCEDURE — 74011250636 HC RX REV CODE- 250/636: Performed by: INTERNAL MEDICINE

## 2021-07-04 PROCEDURE — 65270000029 HC RM PRIVATE

## 2021-07-04 PROCEDURE — 74011000250 HC RX REV CODE- 250: Performed by: INTERNAL MEDICINE

## 2021-07-04 PROCEDURE — 74011250637 HC RX REV CODE- 250/637: Performed by: NURSE PRACTITIONER

## 2021-07-04 PROCEDURE — 74011250637 HC RX REV CODE- 250/637: Performed by: INTERNAL MEDICINE

## 2021-07-04 PROCEDURE — 74011250636 HC RX REV CODE- 250/636: Performed by: HOSPITALIST

## 2021-07-04 PROCEDURE — 74011250636 HC RX REV CODE- 250/636: Performed by: NURSE PRACTITIONER

## 2021-07-04 PROCEDURE — 77030041076 HC DRSG AG OPTICELL MDII -A

## 2021-07-04 PROCEDURE — 80048 BASIC METABOLIC PNL TOTAL CA: CPT

## 2021-07-04 PROCEDURE — 74011250637 HC RX REV CODE- 250/637: Performed by: HOSPITALIST

## 2021-07-04 PROCEDURE — 83735 ASSAY OF MAGNESIUM: CPT

## 2021-07-04 PROCEDURE — 74011000258 HC RX REV CODE- 258: Performed by: STUDENT IN AN ORGANIZED HEALTH CARE EDUCATION/TRAINING PROGRAM

## 2021-07-04 PROCEDURE — 84100 ASSAY OF PHOSPHORUS: CPT

## 2021-07-04 PROCEDURE — 36415 COLL VENOUS BLD VENIPUNCTURE: CPT

## 2021-07-04 PROCEDURE — 83880 ASSAY OF NATRIURETIC PEPTIDE: CPT

## 2021-07-04 PROCEDURE — 74011250637 HC RX REV CODE- 250/637: Performed by: STUDENT IN AN ORGANIZED HEALTH CARE EDUCATION/TRAINING PROGRAM

## 2021-07-04 PROCEDURE — 2709999900 HC NON-CHARGEABLE SUPPLY

## 2021-07-04 RX ORDER — LANOLIN ALCOHOL/MO/W.PET/CERES
400 CREAM (GRAM) TOPICAL 2 TIMES DAILY
Status: DISCONTINUED | OUTPATIENT
Start: 2021-07-04 | End: 2021-07-15 | Stop reason: HOSPADM

## 2021-07-04 RX ORDER — LISINOPRIL 10 MG/1
10 TABLET ORAL DAILY
Status: DISCONTINUED | OUTPATIENT
Start: 2021-07-05 | End: 2021-07-15 | Stop reason: HOSPADM

## 2021-07-04 RX ORDER — TRIAMCINOLONE ACETONIDE 1 MG/G
OINTMENT TOPICAL 2 TIMES DAILY
Status: DISCONTINUED | OUTPATIENT
Start: 2021-07-04 | End: 2021-07-15 | Stop reason: HOSPADM

## 2021-07-04 RX ADMIN — DIPHENHYDRAMINE HYDROCHLORIDE 25 MG: 25 CAPSULE ORAL at 12:56

## 2021-07-04 RX ADMIN — Medication 10 ML: at 08:15

## 2021-07-04 RX ADMIN — TAMSULOSIN HYDROCHLORIDE 0.4 MG: 0.4 CAPSULE ORAL at 08:01

## 2021-07-04 RX ADMIN — SODIUM CHLORIDE 10 ML: 9 INJECTION, SOLUTION INTRAMUSCULAR; INTRAVENOUS; SUBCUTANEOUS at 22:21

## 2021-07-04 RX ADMIN — LINEZOLID 600 MG: 600 INJECTION, SOLUTION INTRAVENOUS at 22:16

## 2021-07-04 RX ADMIN — TRIAMCINOLONE ACETONIDE: 1 OINTMENT TOPICAL at 22:21

## 2021-07-04 RX ADMIN — OXYCODONE 10 MG: 5 TABLET ORAL at 18:55

## 2021-07-04 RX ADMIN — SODIUM CHLORIDE 10 ML: 9 INJECTION, SOLUTION INTRAMUSCULAR; INTRAVENOUS; SUBCUTANEOUS at 11:35

## 2021-07-04 RX ADMIN — MEROPENEM 500 MG: 500 INJECTION, POWDER, FOR SOLUTION INTRAVENOUS at 11:34

## 2021-07-04 RX ADMIN — BUMETANIDE 1 MG: 0.25 INJECTION INTRAMUSCULAR; INTRAVENOUS at 18:46

## 2021-07-04 RX ADMIN — METHYLPREDNISOLONE SODIUM SUCCINATE 40 MG: 125 INJECTION, POWDER, FOR SOLUTION INTRAMUSCULAR; INTRAVENOUS at 22:11

## 2021-07-04 RX ADMIN — GABAPENTIN 300 MG: 300 CAPSULE ORAL at 00:36

## 2021-07-04 RX ADMIN — GABAPENTIN 300 MG: 300 CAPSULE ORAL at 15:40

## 2021-07-04 RX ADMIN — GABAPENTIN 300 MG: 300 CAPSULE ORAL at 08:01

## 2021-07-04 RX ADMIN — MEROPENEM 500 MG: 500 INJECTION, POWDER, FOR SOLUTION INTRAVENOUS at 18:56

## 2021-07-04 RX ADMIN — ASPIRIN 81 MG: 81 TABLET, CHEWABLE ORAL at 08:01

## 2021-07-04 RX ADMIN — MAGNESIUM OXIDE 400 MG (241.3 MG MAGNESIUM) TABLET 400 MG: TABLET at 09:37

## 2021-07-04 RX ADMIN — MEROPENEM 500 MG: 500 INJECTION, POWDER, FOR SOLUTION INTRAVENOUS at 05:14

## 2021-07-04 RX ADMIN — TRIAMCINOLONE ACETONIDE: 1 OINTMENT TOPICAL at 09:38

## 2021-07-04 RX ADMIN — METHADONE HYDROCHLORIDE 140 MG: 10 CONCENTRATE ORAL at 08:04

## 2021-07-04 RX ADMIN — Medication 1 CAPSULE: at 08:01

## 2021-07-04 RX ADMIN — METHYLPREDNISOLONE SODIUM SUCCINATE 40 MG: 125 INJECTION, POWDER, FOR SOLUTION INTRAMUSCULAR; INTRAVENOUS at 09:35

## 2021-07-04 RX ADMIN — FAMOTIDINE 20 MG: 20 TABLET ORAL at 17:50

## 2021-07-04 RX ADMIN — ACETAMINOPHEN 650 MG: 325 TABLET ORAL at 22:12

## 2021-07-04 RX ADMIN — OXYCODONE 10 MG: 5 TABLET ORAL at 23:55

## 2021-07-04 RX ADMIN — HYDROMORPHONE HYDROCHLORIDE 4 MG: 2 TABLET ORAL at 15:40

## 2021-07-04 RX ADMIN — BUMETANIDE 1 MG: 0.25 INJECTION INTRAMUSCULAR; INTRAVENOUS at 08:09

## 2021-07-04 RX ADMIN — SODIUM CHLORIDE 10 ML: 9 INJECTION, SOLUTION INTRAMUSCULAR; INTRAVENOUS; SUBCUTANEOUS at 05:13

## 2021-07-04 RX ADMIN — MAGNESIUM OXIDE 400 MG (241.3 MG MAGNESIUM) TABLET 400 MG: TABLET at 17:50

## 2021-07-04 RX ADMIN — Medication 3 MG: at 22:12

## 2021-07-04 RX ADMIN — FAMOTIDINE 20 MG: 20 TABLET ORAL at 08:01

## 2021-07-04 RX ADMIN — LINEZOLID 600 MG: 600 INJECTION, SOLUTION INTRAVENOUS at 09:34

## 2021-07-04 RX ADMIN — LEVOTHYROXINE SODIUM 125 MCG: 0.12 TABLET ORAL at 05:11

## 2021-07-04 RX ADMIN — COLCHICINE 0.6 MG: 0.6 TABLET, FILM COATED ORAL at 08:14

## 2021-07-04 RX ADMIN — ENOXAPARIN SODIUM 40 MG: 40 INJECTION SUBCUTANEOUS at 08:08

## 2021-07-04 RX ADMIN — FENTANYL CITRATE 25 MCG: 50 INJECTION, SOLUTION INTRAMUSCULAR; INTRAVENOUS at 01:38

## 2021-07-04 RX ADMIN — HYDROMORPHONE HYDROCHLORIDE 4 MG: 2 TABLET ORAL at 05:11

## 2021-07-04 RX ADMIN — GABAPENTIN 300 MG: 300 CAPSULE ORAL at 22:11

## 2021-07-04 NOTE — PROGRESS NOTES
End of Shift Note    Bedside shift change report given to EMERY Skinner (oncoming nurse) by Emelia Hay RN (offgoing nurse). Report included the following information SBAR, Kardex, Intake/Output, MAR and Recent Results    Shift worked:  7a-7p     Shift summary and any significant changes:     Doppler study for left arm due to swelling, not done yet. If swelling continues to decrease may not need it. Started on steroid today and steroid cream for right lower leg itching with relief. Wound care not completed yet as pt was not ready to have it done when nurse was ready. One person assist to W/C for mobility     Concerns for physician to address:      Zone phone for oncoming shift:   [phone]       Activity:  Activity Level: Up with Assistance  Number times ambulated in hallways past shift: 0  Number of times OOB to chair past shift: 1, up to side of bed  All meals. Once up to bathroom via wheelchair    Cardiac:   Cardiac Monitoring: Not applicable      Cardiac Rhythm: Sinus Rhythm    Access:   Current line(s): PIV     Genitourinary:   Urinary status: voiding    Respiratory:   O2 Device: None (Room air)  Chronic home O2 use?: N/A  Incentive spirometer at bedside: N/A     GI:  Last Bowel Movement Date: 07/03/21  Current diet:  ADULT DIET Regular  ADULT ORAL NUTRITION SUPPLEMENT Breakfast, Dinner; Low Calorie/High Protein  ADULT ORAL NUTRITION SUPPLEMENT Lunch, Dinner; Other Supplement; Protein powder or supplement  Passing flatus: YES  Tolerating current diet: YES       Pain Management:   Patient states pain is manageable on current regimen: NO    Skin:  Dejuan Score: 20  Interventions: float heels, increase time out of bed and limit briefs    Patient Safety:  Fall Score:  Total Score: 3  Interventions: bed/chair alarm, assistive device (walker, cane, etc), gripper socks, pt to call before getting OOB and stay with me (per policy)  High Fall Risk: Yes    Length of Stay:  Expected LOS: 3d 12h  Actual LOS: [address] [place]red Part

## 2021-07-04 NOTE — PROGRESS NOTES
End of Shift Note    Bedside shift change report given to Taylor Li RN (oncoming nurse) by Monica Lopez RN (offgoing nurse). Report included the following information SBAR, Kardex, Intake/Output, MAR and Recent Results    Shift worked: 4923-9458     Shift summary and any significant changes:    Patient requesting pain medication throughout the night, prior to next due time. Patient threatening to leave AMA mulitple times during the shift since his pain isn't controlled. NP Purveyor increased gabapentin to 300mg 3 times daily and 1 time dose to be given now, also ordered one time fentanyl 25mcg IV. Concerns for physician to address:  Pain management     Zone phone for oncoming shift:  [phone]     Activity:  Activity Level: Up with Assistance  Number times ambulated in hallways past shift: 0  Number of times OOB to chair past shift: 0    Cardiac:   Cardiac Monitoring: No      Cardiac Rhythm: Sinus Rhythm    Access:   Current line(s): PIV     Genitourinary:   Urinary status: voiding    Respiratory:   O2 Device: None (Room air)  Chronic home O2 use?: NO     GI:  Last Bowel Movement Date: 07/03/21  Current diet:  ADULT DIET Regular  ADULT ORAL NUTRITION SUPPLEMENT Breakfast, Dinner; Low Calorie/High Protein  Passing flatus: YES  Tolerating current diet: YES       Pain Management:   Patient states pain is manageable on current regimen: YES    Skin:  Dejuan Score: 20  Interventions: float heels and increase time out of bed    Patient Safety:  Fall Score:  Total Score: 3  Interventions: gripper socks and pt to call before getting OOB  High Fall Risk: Yes    Length of Stay:  Expected LOS: 3d 12h  Actual LOS: 9      Monica Lopez RN

## 2021-07-04 NOTE — PROGRESS NOTES
Received message from patient's nurse stating / informing that    Patient continues to have significant pain to lower extremity despite multiple pain medications that he currently receives. Discussion / orders:    Reviewed patient record and medication list.  According to attending physician note: \"His situation has gotten worse over the past year. He does not want any amputation but he wants to get better and pursue [facility] transfer if needed. I will ask palliative care to help with emotional support and pain med adjustments\"    He is currently on following pain medications:  Gabapentin 100 mg by mouth 3 times daily -most recent dose around 2100  Methadone 140 mg by mouth daily which she received this morning around 8 AM  Colchicine 0.6 mg by mouth twice daily which she has not received today or yesterday  Topical lidocaine 4% cream -most recent dose was administered yesterday around 1300  Dilaudid 4 mg tablet every 4 hours as needed -most recent dose tonight at 2000  Oxycodone 10 mg by mouth every 4 hours as needed-most recent dose tonight around 2200    Patient continues to verbalize uncontrolled pain despite medications above and most recent medication administrations. I will increase gabapentin to 300 mg by mouth 3 times daily with 1 dose to be given now  I also entered a one-time order for fentanyl 25 mcg IV  We will await evaluation by palliative care for pain med adjustments           Please note that this note was dictated using Dragon computer voice recognition software. Quite often unanticipated grammatical, syntax, homophones, and other interpretive errors are inadvertently transcribed by the computer software. Please disregard these errors. Please excuse any errors that have escaped final proofreading.

## 2021-07-05 LAB
ANION GAP SERPL CALC-SCNC: 6 MMOL/L (ref 5–15)
BUN SERPL-MCNC: 11 MG/DL (ref 6–20)
BUN/CREAT SERPL: 21 (ref 12–20)
CALCIUM SERPL-MCNC: 8.5 MG/DL (ref 8.5–10.1)
CHLORIDE SERPL-SCNC: 92 MMOL/L (ref 97–108)
CO2 SERPL-SCNC: 33 MMOL/L (ref 21–32)
CREAT SERPL-MCNC: 0.52 MG/DL (ref 0.7–1.3)
GLUCOSE SERPL-MCNC: 164 MG/DL (ref 65–100)
MAGNESIUM SERPL-MCNC: 1.7 MG/DL (ref 1.6–2.4)
POTASSIUM SERPL-SCNC: 4 MMOL/L (ref 3.5–5.1)
SODIUM SERPL-SCNC: 131 MMOL/L (ref 136–145)

## 2021-07-05 PROCEDURE — 74011250637 HC RX REV CODE- 250/637: Performed by: STUDENT IN AN ORGANIZED HEALTH CARE EDUCATION/TRAINING PROGRAM

## 2021-07-05 PROCEDURE — 36415 COLL VENOUS BLD VENIPUNCTURE: CPT

## 2021-07-05 PROCEDURE — 74011000258 HC RX REV CODE- 258: Performed by: STUDENT IN AN ORGANIZED HEALTH CARE EDUCATION/TRAINING PROGRAM

## 2021-07-05 PROCEDURE — 94760 N-INVAS EAR/PLS OXIMETRY 1: CPT

## 2021-07-05 PROCEDURE — 74011250637 HC RX REV CODE- 250/637: Performed by: NURSE PRACTITIONER

## 2021-07-05 PROCEDURE — 77030041070 HC DRSG ALG MDII -A

## 2021-07-05 PROCEDURE — 74011250637 HC RX REV CODE- 250/637: Performed by: HOSPITALIST

## 2021-07-05 PROCEDURE — 74011000250 HC RX REV CODE- 250: Performed by: HOSPITALIST

## 2021-07-05 PROCEDURE — 83735 ASSAY OF MAGNESIUM: CPT

## 2021-07-05 PROCEDURE — 2709999900 HC NON-CHARGEABLE SUPPLY

## 2021-07-05 PROCEDURE — 74011250636 HC RX REV CODE- 250/636: Performed by: STUDENT IN AN ORGANIZED HEALTH CARE EDUCATION/TRAINING PROGRAM

## 2021-07-05 PROCEDURE — 74011250637 HC RX REV CODE- 250/637: Performed by: INTERNAL MEDICINE

## 2021-07-05 PROCEDURE — 65270000029 HC RM PRIVATE

## 2021-07-05 PROCEDURE — 74011250636 HC RX REV CODE- 250/636: Performed by: INTERNAL MEDICINE

## 2021-07-05 PROCEDURE — 80048 BASIC METABOLIC PNL TOTAL CA: CPT

## 2021-07-05 PROCEDURE — 74011250636 HC RX REV CODE- 250/636: Performed by: HOSPITALIST

## 2021-07-05 PROCEDURE — 74011000250 HC RX REV CODE- 250: Performed by: STUDENT IN AN ORGANIZED HEALTH CARE EDUCATION/TRAINING PROGRAM

## 2021-07-05 RX ORDER — FUROSEMIDE 20 MG/1
20 TABLET ORAL DAILY
Status: DISCONTINUED | OUTPATIENT
Start: 2021-07-06 | End: 2021-07-15 | Stop reason: HOSPADM

## 2021-07-05 RX ADMIN — TAMSULOSIN HYDROCHLORIDE 0.4 MG: 0.4 CAPSULE ORAL at 09:20

## 2021-07-05 RX ADMIN — LINEZOLID 600 MG: 600 INJECTION, SOLUTION INTRAVENOUS at 23:21

## 2021-07-05 RX ADMIN — SODIUM CHLORIDE 10 ML: 9 INJECTION, SOLUTION INTRAMUSCULAR; INTRAVENOUS; SUBCUTANEOUS at 15:13

## 2021-07-05 RX ADMIN — METHYLPREDNISOLONE SODIUM SUCCINATE 40 MG: 125 INJECTION, POWDER, FOR SOLUTION INTRAMUSCULAR; INTRAVENOUS at 21:15

## 2021-07-05 RX ADMIN — MEROPENEM 500 MG: 500 INJECTION, POWDER, FOR SOLUTION INTRAVENOUS at 18:51

## 2021-07-05 RX ADMIN — METHADONE HYDROCHLORIDE 140 MG: 10 CONCENTRATE ORAL at 09:23

## 2021-07-05 RX ADMIN — SODIUM CHLORIDE 10 ML: 9 INJECTION, SOLUTION INTRAMUSCULAR; INTRAVENOUS; SUBCUTANEOUS at 05:14

## 2021-07-05 RX ADMIN — GABAPENTIN 300 MG: 300 CAPSULE ORAL at 18:52

## 2021-07-05 RX ADMIN — Medication 10 ML: at 09:45

## 2021-07-05 RX ADMIN — METHYLPREDNISOLONE SODIUM SUCCINATE 40 MG: 125 INJECTION, POWDER, FOR SOLUTION INTRAMUSCULAR; INTRAVENOUS at 09:54

## 2021-07-05 RX ADMIN — MAGNESIUM OXIDE 400 MG (241.3 MG MAGNESIUM) TABLET 400 MG: TABLET at 18:52

## 2021-07-05 RX ADMIN — TRIAMCINOLONE ACETONIDE: 1 OINTMENT TOPICAL at 18:52

## 2021-07-05 RX ADMIN — OXYCODONE 10 MG: 5 TABLET ORAL at 09:21

## 2021-07-05 RX ADMIN — LINEZOLID 600 MG: 600 INJECTION, SOLUTION INTRAVENOUS at 09:43

## 2021-07-05 RX ADMIN — MEROPENEM 500 MG: 500 INJECTION, POWDER, FOR SOLUTION INTRAVENOUS at 13:38

## 2021-07-05 RX ADMIN — GABAPENTIN 300 MG: 300 CAPSULE ORAL at 09:21

## 2021-07-05 RX ADMIN — LIDOCAINE: 40 CREAM TOPICAL at 18:58

## 2021-07-05 RX ADMIN — HYDROMORPHONE HYDROCHLORIDE 4 MG: 2 TABLET ORAL at 15:13

## 2021-07-05 RX ADMIN — FAMOTIDINE 20 MG: 20 TABLET ORAL at 18:00

## 2021-07-05 RX ADMIN — MAGNESIUM OXIDE 400 MG (241.3 MG MAGNESIUM) TABLET 400 MG: TABLET at 09:21

## 2021-07-05 RX ADMIN — COLCHICINE 0.6 MG: 0.6 TABLET, FILM COATED ORAL at 09:43

## 2021-07-05 RX ADMIN — ENOXAPARIN SODIUM 40 MG: 40 INJECTION SUBCUTANEOUS at 09:22

## 2021-07-05 RX ADMIN — ASPIRIN 81 MG: 81 TABLET, CHEWABLE ORAL at 09:21

## 2021-07-05 RX ADMIN — MEROPENEM 500 MG: 500 INJECTION, POWDER, FOR SOLUTION INTRAVENOUS at 00:50

## 2021-07-05 RX ADMIN — OXYCODONE 10 MG: 5 TABLET ORAL at 18:52

## 2021-07-05 RX ADMIN — LISINOPRIL 10 MG: 10 TABLET ORAL at 09:22

## 2021-07-05 RX ADMIN — GABAPENTIN 300 MG: 300 CAPSULE ORAL at 21:15

## 2021-07-05 RX ADMIN — Medication 1 CAPSULE: at 09:20

## 2021-07-05 RX ADMIN — HYDROMORPHONE HYDROCHLORIDE 4 MG: 2 TABLET ORAL at 05:09

## 2021-07-05 RX ADMIN — MEROPENEM 500 MG: 500 INJECTION, POWDER, FOR SOLUTION INTRAVENOUS at 05:09

## 2021-07-05 RX ADMIN — SODIUM CHLORIDE 10 ML: 9 INJECTION, SOLUTION INTRAMUSCULAR; INTRAVENOUS; SUBCUTANEOUS at 21:15

## 2021-07-05 RX ADMIN — LEVOTHYROXINE SODIUM 125 MCG: 0.12 TABLET ORAL at 05:10

## 2021-07-05 RX ADMIN — FAMOTIDINE 20 MG: 20 TABLET ORAL at 09:21

## 2021-07-05 RX ADMIN — HYDROMORPHONE HYDROCHLORIDE 4 MG: 2 TABLET ORAL at 21:15

## 2021-07-05 RX ADMIN — TRIAMCINOLONE ACETONIDE: 1 OINTMENT TOPICAL at 09:28

## 2021-07-05 RX ADMIN — OXYCODONE 10 MG: 5 TABLET ORAL at 13:38

## 2021-07-05 NOTE — PROGRESS NOTES
Transition of Care Plan:    RUR: 29%  Disposition: Possible SNF    Follow up appointments: Follow up with PCP and specialist   DME needed: TBD by therapist   Transportation at Discharge: Possible Medicaid transport or family   Secor or means to access home:  Family have keys      IM Medicare letter: N/A  Caregiver Contact: Susyabril Hendricksonist (friend) [phone]  Discharge Caregiver contacted prior to discharge? Family will be contacted at the time of d/c.    CM: Demetrio Ordoñez is currently working with pt in the Gen Surg Unit. CM will verify d/c date with physician. and if pt will require additional needs and plans. CM will enter delay and potential d/c date. CM will continue to follow.     JUVENCIO Traore, [address] [place] [address]

## 2021-07-05 NOTE — PROGRESS NOTES
Hospitalist Progress Note    NAME: Coleman Mills   :  1965   MRN:  [id number]       Assessment / Plan:  Severe sepsis POA resolved  Left leg necrotic ulcer due to prior trauma and pyoderma gangrenosum  Intractable leg pain  -has been following with Dr Cheli Trimble (derm) since 2020 and has tried multiple courses of oral steroids. May need cyclosporin or humira. Had wound debridement at [facility] in  which may have worsened the wounds in retrospect.      -Patient needs formal evaluation in an academic center. His current social situation has remained a barrier for consistent follow up, wound care or subspecialty consultation. Patient has agreed to go to a SNF or NH if needed.      -Continue meropenem and Zyvox   -Continue wound care  -continue dilaudid IV for pain. Already on high dose of methadone. Pain is what he brings up all the time. His situation has gotten worse over the past year. He does not want any amputation but he wants to get better and pursue [facility] transfer if needed. I will ask palliative care to help with emotional support and pain med adjustments    -called [facility] transfer center  and spoke with triage MD.  [facility] is on Tier 1 and will only accept transfers for cardiac or strokes. Will call back on  and . If they have no beds by next week, will need to come up with another plan. Addendum: . [facility] is still unable to accept non cardiac / stroke transfers. Will need to discuss alternative options with complex case manager tomorrow. There was mention of the hospital trying to arrange for a virtual visit with [facility] dermatology. LUE swelling, improved with diuresis  Gout  -LUE swelling has improved. Hold diuretics today and restart low dose lasix tomorrow  -Colchicine not helping. I am not convinced this is gout. Started IV steroids yesterday and it has helped some. Will check CRP and sed rate in AM, consider tapering steroids by tomorrow if not helping. GERD  -pepcid bid     HTN  -continue lisinopril     Hypothyroid  -continue levothyroxine    Hx bladder cancer  Hx stroke from aneurysm with left sided weakness  -continue aspirin    Chronic methadone use: Continue home dose methadone     Obesity: BMI 31. Counseling about lifestyle modification provided        Body mass index is 31.66 kg/m². Estimated discharge date: 7/1 once okay with ID  Barriers: Pending PT evaluation    Code status: Full  Prophylaxis: Lovenox  Recommended Disposition: TBD   Taken on admission            Subjective:     Chief Complaint / Reason for Physician Visit  Follow up of sepsis, PG, gout  L arm pain not better but swelling improved a lot with IV bumex    Review of Systems:  Symptom Y/N Comments  Symptom Y/N Comments   Fever/Chills    Chest Pain     Poor Appetite    Edema y    Cough    Abdominal Pain     Sputum    Joint Pain     SOB/BECK    Pruritis/Rash     Nausea/vomit    Tolerating PT/OT     Diarrhea    Tolerating Diet     Constipation    Other       Could NOT obtain due to:      Objective:     VITALS:   Last 24hrs VS reviewed since prior progress note. Most recent are:  Patient Vitals for the past 24 hrs:   Temp Pulse Resp BP SpO2   07/05/21 0739 98.2 °F (36.8 °C) 81 20 105/80 93 %   07/05/21 0527 98.3 °F (36.8 °C) 82 20 101/76 91 %   07/04/21 2326 98.6 °F (37 °C) 73 18 100/83 96 %   07/04/21 1956 98.1 °F (36.7 °C) 85 18 118/86 94 %   07/04/21 1846 -- (!) 109 -- (!) 120/97 --   07/04/21 1541 97.9 °F (36.6 °C) 77 17 112/76 95 %   07/04/21 1247 98.2 °F (36.8 °C) 95 17 120/78 94 %       Intake/Output Summary (Last 24 hours) at 7/5/2021 1017  Last data filed at 7/5/2021 8460  Gross per 24 hour   Intake 1360 ml   Output 1500 ml   Net -140 ml        I had a face to face encounter and independently examined this patient on 7/5/2021, as outlined below:    PHYSICAL EXAM:    General: WD, WN. Alert, cooperative, no acute distress    EENT:  EOMI. Anicteric sclerae.  MMM  Resp:  CTA bilaterally, no wheezing or rales. No accessory muscle use  CV:  Regular  rhythm, LUE swelling and BLE edema  GI:  Soft, Non distended, Non tender. +Bowel sounds  Neurologic:  Alert and oriented X 3, normal speech,   Psych:   Good insight. Not anxious nor agitated  Skin:  Left leg: Large deep circumferential ulcer involving almost all skin below the mid sheen to the forefoot, yellowish fibrinous necrotic base with foul smelling odor, rolled out ulcer edge with minimal surrounding erythema      Reviewed most current lab test results and cultures  YES  Reviewed most current radiology test results   YES  Review and summation of old records today    NO  Reviewed patient's current orders and MAR    YES  PMH/SH reviewed - no change compared to H&P  ________________________________________________________________________  Care Plan discussed with:    Comments   Patient x    Family      RN x    Care Manager     Consultant                        Multidiciplinary team rounds were held today with , nursing, pharmacist and clinical coordinator. Patient's plan of care was discussed; medications were reviewed and discharge planning was addressed. ________________________________________________________________________  Total NON critical care TIME:  15   Minutes    Total CRITICAL CARE TIME Spent:   Minutes non procedure based      Comments   >50% of visit spent in counseling and coordination of care x    ________________________________________________________________________  Bea Vasquez MD     Procedures: see electronic medical records for all procedures/Xrays and details which were not copied into this note but were reviewed prior to creation of Plan. LABS:  I reviewed today's most current labs and imaging studies. Pertinent labs include:  No results for input(s): WBC, HGB, HCT, PLT, HGBEXT, HCTEXT, PLTEXT, HGBEXT, HCTEXT, PLTEXT in the last 72 hours.   Recent Labs     07/05/21  0455 07/04/21  0134   * 134* K 4.0 4.3   CL 92* 97   CO2 33* 30   * 101*   BUN 11 9   CREA 0.52* 0.39*   CA 8.5 7.8*   MG 1.7 1.6   PHOS  --  4.6       Signed: Aranza Cota MD

## 2021-07-06 LAB
COMMENT, HOLDF: NORMAL
SAMPLES BEING HELD,HOLD: NORMAL

## 2021-07-06 PROCEDURE — 74011250636 HC RX REV CODE- 250/636: Performed by: NURSE PRACTITIONER

## 2021-07-06 PROCEDURE — 74011250637 HC RX REV CODE- 250/637: Performed by: NURSE PRACTITIONER

## 2021-07-06 PROCEDURE — 99233 SBSQ HOSP IP/OBS HIGH 50: CPT | Performed by: STUDENT IN AN ORGANIZED HEALTH CARE EDUCATION/TRAINING PROGRAM

## 2021-07-06 PROCEDURE — 74011250637 HC RX REV CODE- 250/637: Performed by: STUDENT IN AN ORGANIZED HEALTH CARE EDUCATION/TRAINING PROGRAM

## 2021-07-06 PROCEDURE — 74011250637 HC RX REV CODE- 250/637: Performed by: INTERNAL MEDICINE

## 2021-07-06 PROCEDURE — 74011000250 HC RX REV CODE- 250: Performed by: STUDENT IN AN ORGANIZED HEALTH CARE EDUCATION/TRAINING PROGRAM

## 2021-07-06 PROCEDURE — 65270000029 HC RM PRIVATE

## 2021-07-06 PROCEDURE — 74011250637 HC RX REV CODE- 250/637: Performed by: HOSPITALIST

## 2021-07-06 PROCEDURE — 74011250636 HC RX REV CODE- 250/636: Performed by: INTERNAL MEDICINE

## 2021-07-06 PROCEDURE — 94760 N-INVAS EAR/PLS OXIMETRY 1: CPT

## 2021-07-06 PROCEDURE — 74011250636 HC RX REV CODE- 250/636: Performed by: HOSPITALIST

## 2021-07-06 RX ORDER — FENTANYL CITRATE 50 UG/ML
50 INJECTION, SOLUTION INTRAMUSCULAR; INTRAVENOUS ONCE
Status: COMPLETED | OUTPATIENT
Start: 2021-07-07 | End: 2021-07-06

## 2021-07-06 RX ORDER — COLCHICINE 0.6 MG/1
0.6 TABLET ORAL DAILY
Status: DISCONTINUED | OUTPATIENT
Start: 2021-07-06 | End: 2021-07-08

## 2021-07-06 RX ADMIN — TRIAMCINOLONE ACETONIDE: 1 OINTMENT TOPICAL at 09:49

## 2021-07-06 RX ADMIN — METHYLPREDNISOLONE SODIUM SUCCINATE 40 MG: 125 INJECTION, POWDER, FOR SOLUTION INTRAMUSCULAR; INTRAVENOUS at 09:49

## 2021-07-06 RX ADMIN — FAMOTIDINE 20 MG: 20 TABLET ORAL at 18:45

## 2021-07-06 RX ADMIN — FAMOTIDINE 20 MG: 20 TABLET ORAL at 09:46

## 2021-07-06 RX ADMIN — METHYLPREDNISOLONE SODIUM SUCCINATE 40 MG: 125 INJECTION, POWDER, FOR SOLUTION INTRAMUSCULAR; INTRAVENOUS at 21:27

## 2021-07-06 RX ADMIN — HYDROMORPHONE HYDROCHLORIDE 4 MG: 2 TABLET ORAL at 13:59

## 2021-07-06 RX ADMIN — OXYCODONE 10 MG: 5 TABLET ORAL at 16:45

## 2021-07-06 RX ADMIN — METHADONE HYDROCHLORIDE 140 MG: 10 CONCENTRATE ORAL at 06:46

## 2021-07-06 RX ADMIN — ENOXAPARIN SODIUM 40 MG: 40 INJECTION SUBCUTANEOUS at 09:45

## 2021-07-06 RX ADMIN — ACETAMINOPHEN 650 MG: 325 TABLET ORAL at 08:27

## 2021-07-06 RX ADMIN — OXYCODONE 10 MG: 5 TABLET ORAL at 11:27

## 2021-07-06 RX ADMIN — MAGNESIUM OXIDE 400 MG (241.3 MG MAGNESIUM) TABLET 400 MG: TABLET at 18:45

## 2021-07-06 RX ADMIN — SODIUM CHLORIDE 10 ML: 9 INJECTION, SOLUTION INTRAMUSCULAR; INTRAVENOUS; SUBCUTANEOUS at 14:00

## 2021-07-06 RX ADMIN — GABAPENTIN 300 MG: 300 CAPSULE ORAL at 09:47

## 2021-07-06 RX ADMIN — LEVOTHYROXINE SODIUM 125 MCG: 0.12 TABLET ORAL at 06:46

## 2021-07-06 RX ADMIN — Medication 3 MG: at 00:57

## 2021-07-06 RX ADMIN — LISINOPRIL 10 MG: 10 TABLET ORAL at 09:47

## 2021-07-06 RX ADMIN — MAGNESIUM OXIDE 400 MG (241.3 MG MAGNESIUM) TABLET 400 MG: TABLET at 09:48

## 2021-07-06 RX ADMIN — OXYCODONE 10 MG: 5 TABLET ORAL at 00:57

## 2021-07-06 RX ADMIN — ONDANSETRON 4 MG: 2 INJECTION INTRAMUSCULAR; INTRAVENOUS at 21:28

## 2021-07-06 RX ADMIN — HYDROMORPHONE HYDROCHLORIDE 4 MG: 2 TABLET ORAL at 03:39

## 2021-07-06 RX ADMIN — LIDOCAINE: 40 CREAM TOPICAL at 20:07

## 2021-07-06 RX ADMIN — HYDROMORPHONE HYDROCHLORIDE 4 MG: 2 TABLET ORAL at 20:34

## 2021-07-06 RX ADMIN — ASPIRIN 81 MG: 81 TABLET, CHEWABLE ORAL at 09:47

## 2021-07-06 RX ADMIN — SODIUM CHLORIDE 10 ML: 9 INJECTION, SOLUTION INTRAMUSCULAR; INTRAVENOUS; SUBCUTANEOUS at 21:28

## 2021-07-06 RX ADMIN — Medication 3 MG: at 21:28

## 2021-07-06 RX ADMIN — GABAPENTIN 300 MG: 300 CAPSULE ORAL at 16:45

## 2021-07-06 RX ADMIN — FENTANYL CITRATE 50 MCG: 50 INJECTION, SOLUTION INTRAMUSCULAR; INTRAVENOUS at 23:32

## 2021-07-06 RX ADMIN — SODIUM CHLORIDE 10 ML: 9 INJECTION, SOLUTION INTRAMUSCULAR; INTRAVENOUS; SUBCUTANEOUS at 07:05

## 2021-07-06 RX ADMIN — Medication 1 CAPSULE: at 09:45

## 2021-07-06 RX ADMIN — OXYCODONE 10 MG: 5 TABLET ORAL at 21:28

## 2021-07-06 RX ADMIN — GABAPENTIN 300 MG: 300 CAPSULE ORAL at 21:28

## 2021-07-06 RX ADMIN — ONDANSETRON 4 MG: 2 INJECTION INTRAMUSCULAR; INTRAVENOUS at 11:57

## 2021-07-06 RX ADMIN — COLCHICINE 0.6 MG: 0.6 TABLET, FILM COATED ORAL at 13:56

## 2021-07-06 RX ADMIN — TAMSULOSIN HYDROCHLORIDE 0.4 MG: 0.4 CAPSULE ORAL at 09:46

## 2021-07-06 RX ADMIN — OXYCODONE 10 MG: 5 TABLET ORAL at 08:02

## 2021-07-06 RX ADMIN — FUROSEMIDE 20 MG: 20 TABLET ORAL at 09:48

## 2021-07-06 NOTE — PROGRESS NOTES
Problem: Risk for Spread of Infection  Goal: Prevent transmission of infectious organism to others  Description: Prevent the transmission of infectious organisms to other patients, staff members, and visitors. Outcome: Progressing Towards Goal     Problem: Patient Education:  Go to Education Activity  Goal: Patient/Family Education  Outcome: Progressing Towards Goal     Problem: Falls - Risk of  Goal: *Absence of Falls  Description: Document Yolanda Recio Fall Risk and appropriate interventions in the flowsheet.   Outcome: Progressing Towards Goal  Note: Fall Risk Interventions:  Mobility Interventions: Utilize walker, cane, or other assistive device         Medication Interventions: Patient to call before getting OOB    Elimination Interventions: Patient to call for help with toileting needs    History of Falls Interventions: Room close to nurse's station         Problem: Patient Education: Go to Patient Education Activity  Goal: Patient/Family Education  Outcome: Progressing Towards Goal     Problem: Patient Education: Go to Patient Education Activity  Goal: Patient/Family Education  Outcome: Progressing Towards Goal     Problem: Sepsis: Day 6  Goal: Off Pathway (Use only if patient is Off Pathway)  Outcome: Progressing Towards Goal  Goal: *Oxygen saturation within defined limits  Outcome: Progressing Towards Goal  Goal: *Vital signs within defined limits  Outcome: Progressing Towards Goal  Goal: *Tolerating diet  Outcome: Progressing Towards Goal  Goal: *Demonstrates progressive activity  Outcome: Progressing Towards Goal  Goal: Influenza immunization  Outcome: Progressing Towards Goal  Goal: *Pneumococcal immunization  Outcome: Progressing Towards Goal  Goal: Activity/Safety  Outcome: Progressing Towards Goal  Goal: Diagnostic Test/Procedures  Outcome: Progressing Towards Goal  Goal: Nutrition/Diet  Outcome: Progressing Towards Goal  Goal: Discharge Planning  Outcome: Progressing Towards Goal  Goal: Medications  Outcome: Progressing Towards Goal  Goal: Respiratory  Outcome: Progressing Towards Goal  Goal: Treatments/Interventions/Procedures  Outcome: Progressing Towards Goal  Goal: Psychosocial  Outcome: Progressing Towards Goal     Problem: Sepsis: Discharge Outcomes  Goal: *Vital signs within defined limits  Outcome: Progressing Towards Goal  Goal: *Tolerating diet  Outcome: Progressing Towards Goal  Goal: *Verbalizes understanding and describes prescribed diet  Outcome: Progressing Towards Goal  Goal: *Demonstrates progressive activity  Outcome: Progressing Towards Goal  Goal: *Describes follow-up/return visits to physicians  Outcome: Progressing Towards Goal  Goal: *Verbalizes name, dosage, time, side effects, and number of days to continue medications  Outcome: Progressing Towards Goal  Goal: *Influenza immunization (Oct-Mar only)  Outcome: Progressing Towards Goal  Goal: *Pneumococcal immunization  Outcome: Progressing Towards Goal  Goal: *Lungs clear or at baseline  Outcome: Progressing Towards Goal  Goal: *Oxygen saturation returns to baseline or 90% or better on room air  Outcome: Progressing Towards Goal  Goal: *Glycemic control  Outcome: Progressing Towards Goal  Goal: *Absence of deep venous thrombosis signs and symptoms(Stroke Metric)  Outcome: Progressing Towards Goal  Goal: *Describes available resources and support systems  Outcome: Progressing Towards Goal  Goal: *Optimal pain control at patient's stated goal  Outcome: Progressing Towards Goal     Problem: Hypertension  Goal: *Blood pressure within specified parameters  Outcome: Progressing Towards Goal  Goal: *Fluid volume balance  Outcome: Progressing Towards Goal  Goal: *Labs within defined limits  Outcome: Progressing Towards Goal     Problem: Patient Education: Go to Patient Education Activity  Goal: Patient/Family Education  Outcome: Progressing Towards Goal     Problem: Pressure Injury - Risk of  Goal: *Prevention of pressure injury  Description: Document Dejuan Scale and appropriate interventions in the flowsheet.   Outcome: Progressing Towards Goal     Problem: Patient Education: Go to Patient Education Activity  Goal: Patient/Family Education  Outcome: Progressing Towards Goal     Problem: Pain  Goal: *Control of Pain  Outcome: Progressing Towards Goal  Goal: *PALLIATIVE CARE:  Alleviation of Pain  Outcome: Progressing Towards Goal     Problem: Patient Education: Go to Patient Education Activity  Goal: Patient/Family Education  Outcome: Progressing Towards Goal

## 2021-07-06 NOTE — PROGRESS NOTES
Comprehensive Nutrition Assessment    Type and Reason for Visit: Reassess    Nutrition Recommendations/Plan:   · Continue regular diet. · RD increased Ensure High Protein from BID to TID; chocolate flavor preference. Will provide 48 g protein if consume 100%. · RD added Ten wound healing supplement 1-pkt po BID (mixed with 8oz water). Explained the importance of consistency daily for optimal effectiveness. Will send with Breakfast and Dinner meal. Must be consumed for a minimum of 14 days. · May benefit from a daily MVI supplement. · Please document % meals and supplements consumed in flowsheet I/O's under intake. Nutrition Assessment:     7/6: Chart reviewed; med noted for sepsis, left necrotic ulcer and gangrene. RD visited pt at bedside this afternoon, pt c/o nausea at time of visit. Pt requested we increased Ensure High Protein from twice daily to TID in effort to meet protein needs. Pt inquiring about other nutritional supplements. Discussed the addition of Ten 1-pkt BID which has glutamine and arginine to assist with optimal wound healing. Explained to the patient must be consuming twice daily x 14 days for optimal effectiveness. RD provided a pack at the bedside and mixed with 8oz water for pt to try; pt likes the supplement and agreed to consume. Pt requests to be given with bottle water as pt does not consume tap water.     Patient Vitals for the past 168 hrs:   % Diet Eaten   07/06/21 1128 51 - 75%   07/06/21 0847 0%   07/05/21 0739 76 - 100%   07/04/21 1855 76 - 100%   07/04/21 1247 76 - 100%   07/04/21 0825 26 - 50%   07/02/21 1326 76 - 100%   07/01/21 1322 76 - 100%   07/01/21 0901 26 - 50%     Last Weight Metric  Weight Loss Metrics 7/3/2021 4/27/2021 12/11/2020 12/10/2020 10/21/2020 10/20/2020 9/9/2020   Today's Wt 250 lb 1.8 oz 254 lb 9.6 oz 237 lb 14 oz - - 242 lb 235 lb   BMI 33 kg/m2 33.59 kg/m2 - 31.53 kg/m2 31.93 kg/m2 - 31 kg/m2   Some encounter information is confidential and restricted. Go to Review Flowsheets activity to see all data. Estimated Daily Nutrient Needs:  Energy (kcal): 2376 (BMR 2020 x 1. 3AF) - 250 kcals; Weight Used for Energy Requirements: Current  Protein (g): 135 (1.2 g/kg bw); Weight Used for Protein Requirements: Current  Fluid (ml/day): 2300 ml/day; Method Used for Fluid Requirements: 1 ml/kcal    Nutrition Related Findings:  BM: 7/5; Labs: reviewed; Meds: lasix, solumedrol      Wounds:      sepsis and osteomyelitis of left leg/necrotic ulcer and gangrene      Current Nutrition Therapies:  ADULT ORAL NUTRITION SUPPLEMENT Dinner, Breakfast; Wound Healing Supplement  ADULT DIET Regular; Semd 1-8oz bottle water every meal tray  ADULT ORAL NUTRITION SUPPLEMENT Breakfast, Dinner, Lunch; Low Calorie/High Protein    Anthropometric Measures:  · Height:  6' 1\" (185.4 cm)  · Current Body Wt:  113.9 kg (251 lb 1.7 oz)   · Ideal Body Wt:  184 lbs:  136.5 %   · BMI Category:  Obese class 1 (BMI 30.0-34. 9)       Nutrition Diagnosis:   · Increased nutrient needs related to  (severe lower leg/foot wound with osteomyelitis) as evidenced by  (increased protein and energy needs to support wound healing)    Nutrition Interventions:   Food and/or Nutrient Delivery: Continue current diet, Start oral nutrition supplement  Nutrition Education and Counseling: No recommendations at this time  Coordination of Nutrition Care: Continue to monitor while inpatient    Goals:  PO intake >75% of meals + consume 240 ml ONS next 3-5 days       Nutrition Monitoring and Evaluation:   Behavioral-Environmental Outcomes: None identified  Food/Nutrient Intake Outcomes: Food and nutrient intake, Supplement intake  Physical Signs/Symptoms Outcomes: Biochemical data, Skin, Weight, GI status    Discharge Planning:     Too soon to determine     Electronically signed by Rossy Kamara RD on 7/6/2021 at 2:30 PM

## 2021-07-06 NOTE — PROGRESS NOTES
Kris Posada RN, supervised and agree with all assessments and actions completed by [facility]

## 2021-07-06 NOTE — PROGRESS NOTES
End of Shift Note    Bedside shift change report given to [address] [place] [address] (oncoming nurse) by Denis Hoyos (offgoing nurse). Report included the following information SBAR, Kardex, Intake/Output and MAR    Shift worked:  07a-7p     Shift summary and any significant changes:     tolerated diet  Tolerated pain regimen   Room air   Assist x 1   Wound care   Contact precaution    Concerns for physician to address:  none     Zone phone for oncoming shift:   none       Activity:  Activity Level: Up with Assistance  Number times ambulated in hallways past shift: 0  Number of times OOB to chair past shift: 0    Cardiac:   Cardiac Monitoring: No      Cardiac Rhythm: Sinus Rhythm    Access:   Current line(s): PIV     Genitourinary:   Urinary status: voiding    Respiratory:   O2 Device: None (Room air)  Chronic home O2 use?: NO  Incentive spirometer at bedside: YES     GI:  Last Bowel Movement Date: 07/05/21  Current diet:  ADULT DIET Regular  ADULT ORAL NUTRITION SUPPLEMENT Breakfast, Dinner; Low Calorie/High Protein  ADULT ORAL NUTRITION SUPPLEMENT Lunch, Dinner; Other Supplement; Protein powder or supplement  Passing flatus: YES  Tolerating current diet: YES       Pain Management:   Patient states pain is manageable on current regimen: YES    Skin:  Dejuan Score: 19  Interventions: float heels, increase time out of bed and PT/OT consult    Patient Safety:  Fall Score:  Total Score: 2  Interventions: gripper socks and pt to call before getting OOB  High Fall Risk: Yes    Length of Stay:  Expected LOS: 3d 12h  Actual LOS: [facility]  04/00/469 1241

## 2021-07-06 NOTE — PROGRESS NOTES
Interdisciplinary Rounds were completed on 07/06/21 for this patient. Rounds included nursing, clinical care leader, pharmacy, and case management. Plan of care discussed. See clinical pathway and/or care plan for interventions and desired outcomes.

## 2021-07-06 NOTE — PROGRESS NOTES
End of Shift Note    Bedside shift change report given to Corey Howe LPN (oncoming nurse) by Wu Clay RN (offgoing nurse). Report included the following information SBAR, Kardex and Recent Results    Shift worked:  7p-7a     Shift summary and any significant changes:     Pt requested pain meds three times during the shift. Concerns for physician to address:  Lab wants to know if the blood draw for Lidocaine is for [facility] or here. If it is for [facility], it will have to be redrawn. Day shift Nurse with verify with MD.     [facility] phone for oncoming shift:   [phone]       Activity:  Activity Level: Up with Assistance  Number times ambulated in hallways past shift: 0  Number of times OOB to chair past shift: 1    Cardiac:   Cardiac Monitoring: No      Cardiac Rhythm: Sinus Rhythm    Access:   Current line(s): PIV     Genitourinary:   Urinary status: voiding    Respiratory:   O2 Device: None (Room air)  Chronic home O2 use?: NO  Incentive spirometer at bedside: NO     GI:  Last Bowel Movement Date: 07/05/21  Current diet:  ADULT DIET Regular  ADULT ORAL NUTRITION SUPPLEMENT Breakfast, Dinner; Low Calorie/High Protein  ADULT ORAL NUTRITION SUPPLEMENT Lunch, Dinner; Other Supplement; Protein powder or supplement  Passing flatus: YES  Tolerating current diet: YES       Pain Management:   Patient states pain is manageable on current regimen: YES    Skin:  Dejuan Score: 19  Interventions: limit briefs and nutritional support     Patient Safety:  Fall Score:  Total Score: 2  Interventions: assistive device (walker, cane, etc), pt to call before getting OOB and stay with me (per policy)  High Fall Risk: Yes    Length of Stay:  Expected LOS: 3d 12h  Actual LOS: 11      Wu Clay RN

## 2021-07-06 NOTE — PROGRESS NOTES
Problem: Risk for Spread of Infection  Goal: Prevent transmission of infectious organism to others  Description: Prevent the transmission of infectious organisms to other patients, staff members, and visitors. Outcome: Progressing Towards Goal     Problem: Patient Education:  Go to Education Activity  Goal: Patient/Family Education  Outcome: Progressing Towards Goal     Problem: Falls - Risk of  Goal: *Absence of Falls  Description: Document Neftaly Camargo Fall Risk and appropriate interventions in the flowsheet.   Outcome: Progressing Towards Goal  Note: Fall Risk Interventions:  Mobility Interventions: Utilize walker, cane, or other assistive device         Medication Interventions: Patient to call before getting OOB    Elimination Interventions: Patient to call for help with toileting needs    History of Falls Interventions: Room close to nurse's station         Problem: Patient Education: Go to Patient Education Activity  Goal: Patient/Family Education  Outcome: Progressing Towards Goal     Problem: Patient Education: Go to Patient Education Activity  Goal: Patient/Family Education  Outcome: Progressing Towards Goal     Problem: Sepsis: Day 6  Goal: Off Pathway (Use only if patient is Off Pathway)  Outcome: Progressing Towards Goal  Goal: *Oxygen saturation within defined limits  Outcome: Progressing Towards Goal  Goal: *Vital signs within defined limits  Outcome: Progressing Towards Goal  Goal: *Tolerating diet  Outcome: Progressing Towards Goal  Goal: *Demonstrates progressive activity  Outcome: Progressing Towards Goal  Goal: Influenza immunization  Outcome: Progressing Towards Goal  Goal: *Pneumococcal immunization  Outcome: Progressing Towards Goal  Goal: Activity/Safety  Outcome: Progressing Towards Goal  Goal: Diagnostic Test/Procedures  Outcome: Progressing Towards Goal  Goal: Nutrition/Diet  Outcome: Progressing Towards Goal  Goal: Discharge Planning  Outcome: Progressing Towards Goal  Goal: Medications  Outcome: Progressing Towards Goal  Goal: Respiratory  Outcome: Progressing Towards Goal  Goal: Treatments/Interventions/Procedures  Outcome: Progressing Towards Goal  Goal: Psychosocial  Outcome: Progressing Towards Goal     Problem: Sepsis: Discharge Outcomes  Goal: *Vital signs within defined limits  Outcome: Progressing Towards Goal  Goal: *Tolerating diet  Outcome: Progressing Towards Goal  Goal: *Verbalizes understanding and describes prescribed diet  Outcome: Progressing Towards Goal  Goal: *Demonstrates progressive activity  Outcome: Progressing Towards Goal  Goal: *Describes follow-up/return visits to physicians  Outcome: Progressing Towards Goal  Goal: *Verbalizes name, dosage, time, side effects, and number of days to continue medications  Outcome: Progressing Towards Goal  Goal: *Influenza immunization (Oct-Mar only)  Outcome: Progressing Towards Goal  Goal: *Pneumococcal immunization  Outcome: Progressing Towards Goal  Goal: *Lungs clear or at baseline  Outcome: Progressing Towards Goal  Goal: *Oxygen saturation returns to baseline or 90% or better on room air  Outcome: Progressing Towards Goal  Goal: *Glycemic control  Outcome: Progressing Towards Goal  Goal: *Absence of deep venous thrombosis signs and symptoms(Stroke Metric)  Outcome: Progressing Towards Goal  Goal: *Describes available resources and support systems  Outcome: Progressing Towards Goal  Goal: *Optimal pain control at patient's stated goal  Outcome: Progressing Towards Goal     Problem: Hypertension  Goal: *Blood pressure within specified parameters  Outcome: Progressing Towards Goal  Goal: *Fluid volume balance  Outcome: Progressing Towards Goal  Goal: *Labs within defined limits  Outcome: Progressing Towards Goal     Problem: Patient Education: Go to Patient Education Activity  Goal: Patient/Family Education  Outcome: Progressing Towards Goal     Problem: Pressure Injury - Risk of  Goal: *Prevention of pressure injury  Description: Document Dejuan Scale and appropriate interventions in the flowsheet.   Outcome: Progressing Towards Goal  Note: Pressure Injury Interventions:  Sensory Interventions: Assess changes in LOC    Moisture Interventions: Absorbent underpads, Internal/External urinary devices    Activity Interventions: Assess need for specialty bed    Mobility Interventions: Assess need for specialty bed    Nutrition Interventions: Document food/fluid/supplement intake    Friction and Shear Interventions: Minimize layers                Problem: Patient Education: Go to Patient Education Activity  Goal: Patient/Family Education  Outcome: Progressing Towards Goal     Problem: Pain  Goal: *Control of Pain  Outcome: Progressing Towards Goal  Goal: *PALLIATIVE CARE:  Alleviation of Pain  Outcome: Progressing Towards Goal     Problem: Patient Education: Go to Patient Education Activity  Goal: Patient/Family Education  Outcome: Progressing Towards Goal

## 2021-07-06 NOTE — PROGRESS NOTES
Hospitalist Progress Note    NAME: Coleman Mills   :  1965   MRN:  [id number]       Assessment / Plan:  Severe sepsis POA resolved  Left leg necrotic ulcer due to prior trauma and pyoderma gangrenosum  Intractable leg pain  Has been following with Dr Cheli Trimble (derm) since 2020 and has tried multiple courses of oral steroids. May need cyclosporin or humira. Had wound debridement at [address] [place] [address] in  which may have worsened the wounds in retrospect.       -Patient needs formal evaluation in an academic center. His current social situation has remained a barrier for consistent follow up, wound care or subspecialty consultation. Patient has agreed to go to a SNF or NH if needed.       Continue meropenem and Zyvox, ID following. Continue wound care  Continue dilaudid IV for pain.     Prior hospitalist Dr. Tommas Soulier called [facility] transfer center  and spoke with triage MD. And on  . [facility] is still unable to accept non cardiac / stroke transfers. Will need to discuss alternative options with complex case manager tomorrow. Will f/u with CM regarding Dermatology virtual appointment.     LUE swelling, improved with diuresis  Gout  -LUE swelling has improved. Was started on iv steroid, will taper. Doesn't look like gout. F/u CRP and ESR     GERD  -pepcid bid     HTN  -continue lisinopril     Hypothyroid  -continue levothyroxine     Hx bladder cancer  Hx stroke from aneurysm with left sided weakness  -continue aspirin     Chronic methadone use: Continue home dose methadone      Obesity: BMI 31. Counseling about lifestyle modification provided        Body mass index is 31.66 kg/m².   Estimated discharge date:   Barriers: Needs transfer to academic center.     Code status: Full  Prophylaxis: Lovenox  Recommended Disposition: TBD     Subjective:     Chief Complaint / Reason for Physician Visit  Follow up for Left leg necrotic ulcer  His left upper ext pain is slightly better today    Review of Systems:  Symptom Y/N Comments  Symptom Y/N Comments   Fever/Chills n   Chest Pain n    Poor Appetite n   Edema n    Cough    Abdominal Pain     Sputum    Joint Pain     SOB/BECK    Pruritis/Rash     Nausea/vomit    Tolerating PT/OT     Diarrhea    Tolerating Diet     Constipation    Other       Could NOT obtain due to:      Objective:     VITALS:   Last 24hrs VS reviewed since prior progress note. Most recent are:  Patient Vitals for the past 24 hrs:   Temp Pulse Resp BP SpO2   07/06/21 1457 97.7 °F (36.5 °C) 84 20 112/86 94 %   07/06/21 1401 97.8 °F (36.6 °C) 95 20 (!) 152/91 95 %   07/06/21 1107 98 °F (36.7 °C) 80 20 132/87 97 %   07/06/21 0733 97.6 °F (36.4 °C) 82 20 132/87 96 %   07/06/21 0324 98.3 °F (36.8 °C) 82 20 (!) 131/90 96 %   07/05/21 2342 98.2 °F (36.8 °C) 83 18 127/77 95 %   07/05/21 1644 98.2 °F (36.8 °C) 82 17 110/88 96 %       Intake/Output Summary (Last 24 hours) at 7/6/2021 1527  Last data filed at 7/6/2021 1128  Gross per 24 hour   Intake 550 ml   Output 1850 ml   Net -1300 ml        I had a face to face encounter and independently examined this patient on 7/6/2021, as outlined below:  PHYSICAL EXAM:  General: WD, WN. Alert, cooperative, no acute distress    EENT:  EOMI. Anicteric sclerae. MMM  Resp:  CTA bilaterally, no wheezing or rales. No accessory muscle use  CV:  Regular  rhythm,  No edema  GI:  Soft, Non distended, Non tender. +Bowel sounds  Neurologic:  Alert and oriented X 3, normal speech,   Psych:   Good insight. Not anxious nor agitated  Skin:  Left lower ext bulky dressing.     Reviewed most current lab test results and cultures  YES  Reviewed most current radiology test results   YES  Review and summation of old records today    NO  Reviewed patient's current orders and MAR    YES  PMH/SH reviewed - no change compared to H&P  ________________________________________________________________________  Care Plan discussed with:    Comments   Patient y    Family      RN y    Care Manager     Consultant Multidiciplinary team rounds were held today with , nursing, pharmacist and clinical coordinator. Patient's plan of care was discussed; medications were reviewed and discharge planning was addressed. ________________________________________________________________________  Total NON critical care TIME:  35  Minutes    Total CRITICAL CARE TIME Spent:   Minutes non procedure based      Comments   >50% of visit spent in counseling and coordination of care     ________________________________________________________________________  Yamileth Castillo MD     Procedures: see electronic medical records for all procedures/Xrays and details which were not copied into this note but were reviewed prior to creation of Plan. LABS:  I reviewed today's most current labs and imaging studies. Pertinent labs include:  No results for input(s): WBC, HGB, HCT, PLT, HGBEXT, HCTEXT, PLTEXT in the last 72 hours.   Recent Labs     07/05/21  0455 07/04/21  0134   * 134*   K 4.0 4.3   CL 92* 97   CO2 33* 30   * 101*   BUN 11 9   CREA 0.52* 0.39*   CA 8.5 7.8*   MG 1.7 1.6   PHOS  --  4.6       Signed: Yamileth Castillo MD

## 2021-07-06 NOTE — PROGRESS NOTES
Infectious Disease Progress Note         Interval:  Afebrile, VSS    Subjective:   Feels OK. Said the steroids are having some good affect on the pain. Objective:    Vitals:   Reviewed in chart. Physical Exam:  ? GEN: NAD  ? HEENT: Normocephalic, atraumatic, PERRL, no scleral icterus  ? CV: HDS  ? Lungs: room air  ? Abdomen: soft, non distended, obese  ? Genitourinary: , no [place]  ? Extremities: left lower leg wounds are in dressing  ? Neuro: Alert, oriented to time, place and situation, moves all extremities to commands, verbal   ? Skin: no rash  ? Psych: good affect, good eye contact, non tearful   ? Lines: PIVs         Labs:  Recent Results (from the past 24 hour(s))   SAMPLES BEING HELD    Collection Time: 07/06/21  4:30 AM   Result Value Ref Range    SAMPLES BEING HELD  RED     COMMENT        Add-on orders for these samples will be processed based on acceptable specimen integrity and analyte stability, which may vary by analyte. Assessment:  62 yo M with chronic non-healing left lower leg wound for about 2 years after injury from an HVAC unit. [facility] non[facility]specific findings but those that could be seen in Pyoderma gangrenosum.  Patient has a dermatologist Dr. Roslyn Rocha  Patient says he has been getting steroid injections into the wounds on about a monthly basis and this had somewhat started to help the wounds a little. Patient was also started on methotrexate 500mg BID by the dermatologist 2 months ago. He now presented for severe pain in the wounds. Afebrile but WBC 21.7 on arrival. Started on meropenem and linezolid based on most recent ID recommendations from April 2021.      Assessed to have bacterial superinfection of the left leg wounds. Wounds under dressing by wound care team; I reviewed the pictures of the wound from 6/28/21 in chart. ID consulted for abx choice and duration of therapy.           Recommendations:  - Patient had a recent biopsy of left shin in 05/2021 by his outpatient dermatologist Dr. Aric Edwards which again supported the diagnosis of pyoderma gangrenosus. See Media for report. - Patient is on IV steroids now per primary team.    - Patient is requesting a few more days of IV abx. I will extend date of meropenem and linezolid  to 4 more days, till 7/9/21. Total 14 days. - If unable to obtain inpatient to inpatient transfer to [facility], then an urgent dermatological evaluation should be attempted to be set up at [facility]. This was discussed with CM today. If patient is going to be discharged to a Rehab or SNF facility, then this will facilitate transport issues with outpatient dermatological followup. Please note, that patient's outpatient dermatologist and Infectious diseases both recommend patient get dermatological evaluation at an academic instituation.     - Unfortunately, the primary issue is the PG and patient is prone towards these infections and consistent optimum wound care is basic to reduce such complications and towards wound care itself. These infections are also making the primary skin condition worse. - Appreciate wound care assistance. Will follow. Thank you for the opportunity to participate in the care of this patient. Please contact with questions or concerns.       Michele Lawson MD  Infectious Diseases

## 2021-07-07 PROCEDURE — 74011250637 HC RX REV CODE- 250/637: Performed by: STUDENT IN AN ORGANIZED HEALTH CARE EDUCATION/TRAINING PROGRAM

## 2021-07-07 PROCEDURE — 2709999900 HC NON-CHARGEABLE SUPPLY

## 2021-07-07 PROCEDURE — 74011250636 HC RX REV CODE- 250/636: Performed by: STUDENT IN AN ORGANIZED HEALTH CARE EDUCATION/TRAINING PROGRAM

## 2021-07-07 PROCEDURE — 74011250637 HC RX REV CODE- 250/637: Performed by: NURSE PRACTITIONER

## 2021-07-07 PROCEDURE — 99233 SBSQ HOSP IP/OBS HIGH 50: CPT | Performed by: NURSE PRACTITIONER

## 2021-07-07 PROCEDURE — 74011250637 HC RX REV CODE- 250/637: Performed by: INTERNAL MEDICINE

## 2021-07-07 PROCEDURE — 74011250637 HC RX REV CODE- 250/637: Performed by: HOSPITALIST

## 2021-07-07 PROCEDURE — 74011250636 HC RX REV CODE- 250/636: Performed by: HOSPITALIST

## 2021-07-07 PROCEDURE — 77030041076 HC DRSG AG OPTICELL MDII -A

## 2021-07-07 PROCEDURE — 94760 N-INVAS EAR/PLS OXIMETRY 1: CPT

## 2021-07-07 PROCEDURE — 65270000029 HC RM PRIVATE

## 2021-07-07 RX ORDER — LIDOCAINE 40 MG/G
CREAM TOPICAL AS NEEDED
Status: DISCONTINUED | OUTPATIENT
Start: 2021-07-07 | End: 2021-07-15 | Stop reason: HOSPADM

## 2021-07-07 RX ORDER — OXYCODONE HYDROCHLORIDE 5 MG/1
20 TABLET ORAL
Status: DISCONTINUED | OUTPATIENT
Start: 2021-07-07 | End: 2021-07-15 | Stop reason: HOSPADM

## 2021-07-07 RX ADMIN — GABAPENTIN 300 MG: 300 CAPSULE ORAL at 17:39

## 2021-07-07 RX ADMIN — SODIUM CHLORIDE 5 ML: 9 INJECTION, SOLUTION INTRAMUSCULAR; INTRAVENOUS; SUBCUTANEOUS at 21:31

## 2021-07-07 RX ADMIN — OXYCODONE 20 MG: 5 TABLET ORAL at 21:31

## 2021-07-07 RX ADMIN — FUROSEMIDE 20 MG: 20 TABLET ORAL at 10:12

## 2021-07-07 RX ADMIN — ENOXAPARIN SODIUM 40 MG: 40 INJECTION SUBCUTANEOUS at 10:12

## 2021-07-07 RX ADMIN — OXYCODONE 20 MG: 5 TABLET ORAL at 17:40

## 2021-07-07 RX ADMIN — OXYCODONE 10 MG: 5 TABLET ORAL at 03:03

## 2021-07-07 RX ADMIN — GABAPENTIN 300 MG: 300 CAPSULE ORAL at 10:20

## 2021-07-07 RX ADMIN — LEVOTHYROXINE SODIUM 125 MCG: 0.12 TABLET ORAL at 06:06

## 2021-07-07 RX ADMIN — FAMOTIDINE 20 MG: 20 TABLET ORAL at 09:00

## 2021-07-07 RX ADMIN — METHADONE HYDROCHLORIDE 140 MG: 10 CONCENTRATE ORAL at 06:06

## 2021-07-07 RX ADMIN — MAGNESIUM OXIDE 400 MG (241.3 MG MAGNESIUM) TABLET 400 MG: TABLET at 10:20

## 2021-07-07 RX ADMIN — ACETAMINOPHEN 650 MG: 325 TABLET ORAL at 22:36

## 2021-07-07 RX ADMIN — GABAPENTIN 300 MG: 300 CAPSULE ORAL at 21:31

## 2021-07-07 RX ADMIN — TRIAMCINOLONE ACETONIDE: 1 OINTMENT TOPICAL at 10:29

## 2021-07-07 RX ADMIN — SODIUM CHLORIDE 10 ML: 9 INJECTION, SOLUTION INTRAMUSCULAR; INTRAVENOUS; SUBCUTANEOUS at 06:10

## 2021-07-07 RX ADMIN — HYDROMORPHONE HYDROCHLORIDE 4 MG: 2 TABLET ORAL at 10:21

## 2021-07-07 RX ADMIN — METHYLPREDNISOLONE SODIUM SUCCINATE 40 MG: 40 INJECTION, POWDER, FOR SOLUTION INTRAMUSCULAR; INTRAVENOUS at 21:31

## 2021-07-07 RX ADMIN — Medication 1 CAPSULE: at 10:19

## 2021-07-07 RX ADMIN — ACETAMINOPHEN 650 MG: 325 TABLET ORAL at 04:45

## 2021-07-07 RX ADMIN — ASPIRIN 81 MG: 81 TABLET, CHEWABLE ORAL at 10:19

## 2021-07-07 RX ADMIN — HYDROMORPHONE HYDROCHLORIDE 4 MG: 2 TABLET ORAL at 02:00

## 2021-07-07 RX ADMIN — MAGNESIUM OXIDE 400 MG (241.3 MG MAGNESIUM) TABLET 400 MG: TABLET at 17:39

## 2021-07-07 RX ADMIN — ONDANSETRON 4 MG: 4 TABLET, ORALLY DISINTEGRATING ORAL at 12:50

## 2021-07-07 RX ADMIN — SODIUM CHLORIDE 10 ML: 9 INJECTION, SOLUTION INTRAMUSCULAR; INTRAVENOUS; SUBCUTANEOUS at 15:48

## 2021-07-07 RX ADMIN — OXYCODONE 10 MG: 5 TABLET ORAL at 14:03

## 2021-07-07 RX ADMIN — HYDROMORPHONE HYDROCHLORIDE 4 MG: 2 TABLET ORAL at 06:05

## 2021-07-07 RX ADMIN — Medication 3 MG: at 22:36

## 2021-07-07 RX ADMIN — FAMOTIDINE 20 MG: 20 TABLET ORAL at 17:40

## 2021-07-07 RX ADMIN — TAMSULOSIN HYDROCHLORIDE 0.4 MG: 0.4 CAPSULE ORAL at 10:12

## 2021-07-07 RX ADMIN — COLCHICINE 0.6 MG: 0.6 TABLET, FILM COATED ORAL at 10:25

## 2021-07-07 RX ADMIN — TRIAMCINOLONE ACETONIDE: 1 OINTMENT TOPICAL at 21:32

## 2021-07-07 RX ADMIN — LISINOPRIL 10 MG: 10 TABLET ORAL at 10:21

## 2021-07-07 NOTE — PROGRESS NOTES
Received notification from bedside RN about patient with regards to: 10/10 pain s/p wound care, not yet due for PRN Dilaudid and Oxycodone, requesting medication for relief  VS: /72, HR 75, RR 20, O2 sat 97% on RA    Intervention given: Fentanyl 50 mcg IV x 1 dose ordered

## 2021-07-07 NOTE — PROGRESS NOTES
Palliative Medicine Consult  Ashish: [phone] ([phone])    Patient Name: Keny Olson  YOB: 1965    Date of Initial Consult: 7/1/21  Reason for Consult: pain management  Requesting Provider: Yessica Bojorquez MD   Primary Care Physician: None     SUMMARY:   Keny Olson is a 63 y.o. with a past history of CVA, gout, seizures, prior DVT, GERD, bladder cancer, chronic nonhealing wound on his left lower extremity x 2 years, [email or web address] for chronic pain, who was admitted on 6/25/2021 from home with a diagnosis of severe sepsis, severe diffuse osteopenia. Continue Meropenuem and linezolid for total of 10 days (6/25/21 to 7/5/21). Should be ready for discharge 7/5/21. Current medical issues leading to Palliative Medicine involvement include: pain management for complex pain. Per chart review, first document of LLE cellulitis in August 2014, that started when an HVAC unit fell on his leg, with progression over the years to now. He has been admitted to multiple different hospitals including [facility], [facility], [facility], [address], [facility], [facility], he has been seen by multiple surgeons, plastic surgeon, all who have recommended amputation. Pt with significant history of no-show and appt rescheduling with his dermatologist.  Per infectious disease 4/2021,  , she has advised pt that continued antibiotic therapy would be futile and predispose to development of resistant organisms. Per ID note 6/28/21 Vin Echols primary issue is the PG and patient is prone towards these infections and consistent optimum wound care is basic to reduce such complications and towards wound care itself. These infections are also making the primary skin condition worse. In addition, he is going to develop more and more resistant organisms down the line. \"  Attempts are being made to transfer pt to an academic center [facility]) for dermatology clinic. 24 hour opioid use:  7/3: neurontin 300mg, Methadone 140mg, dilaudid 12mg, oxycodone 40mg  7/4: neurontin 900mg, Methadone 140mg, dilaudid  8 mg, oxycodone 20mg, Fentanyl 25mcg  7/5: neurontin 900mg, Methadone 140mg, dilaudid 12mg, oxycodone 30mg  7/6: neurontin 900mg, Methadone 140mg, dilaudid 12mg, oxycodone 50mg, Fentanyl 25mcg    Psychosocial: Pt has [facility] for his insurance. Pt lives in a handicap hotel ([facility]) with his friend. Pt is wheelchair bound and requires assistance for his ADL/IADLs. Pt's friend drives him to his appointments. AMD on file, primary surrogate decision maker Irlanda De Los Santos ([phone])   PALLIATIVE DIAGNOSES:   1. Chronic pain: methadone 140mg daily in outpatient setting, gabapentin 100mg tid    2. Long-term current use of methadone for pain control  3. Opioid dependence  4. Chronic non-healing wound LLE  5. Depression  6. Physical debility   7. Left sided pain s/p stroke     PLAN:   1. Prior to visit, I completed a  review of patient's medical records, including medical documentation, vital signs, MARs, and results of various labs and other diagnostics. I also spoke with patient's RN Lulu Harry. 2. Pt has been taking both oxycodone 10mg and dilaudid 4mg PO, initially told me pain is severe, when I told him he did not need to be on 2 short-acting opiates he stated \"it's the only way my pain is tolerable. \"  I told him I was going to discontinue one, he could choose which worked best for him and I would increase the dose to adjust for the discontinuing of the other medication; he stated that the oxycodone worked better. We discussed that this was only going to be used while inpatient, that when he is discharged it would be discontinued, we discussed asking for it judiciously to avoid dependence as much as possible.  Pt reports that he is only going to a Methadone clinic because it is the only place that is willing to provide him opioid pain therapy and he does not want to have to go out on the street looking for drugs illegally. 1. Discontinue dilaudid. 2. Increase oxycodone to 20mg q. 4 hours prn.   3. Continue Methadone, neurontin. 4. List of outpatient pain clinics provided to pt as requested. 3. Initial consult note routed to primary continuity provider and/or primary health care team members  4. Communicated plan of care with: Palliative [facility] CHOUDHUR[facility] Team     GOALS OF CARE / TREATMENT PREFERENCES:     GOALS OF CARE:  Patient/Health Care Proxy Stated Goals: Prolong life    TREATMENT PREFERENCES:   Code Status: Full Code    Advance Care Planning:  [] [facility] Interdisciplinary Team has updated the ACP Navigator with Health Care Decision Maker and Patient Capacity      Primary Decision MakerCawilda Mann - Other Relative - [phone]  Advance Care Planning 6/25/2021   Patient's Healthcare Decision Maker is: -   Primary Decision Maker Name -   Primary Decision Maker Phone Number -   Primary Decision Maker Relationship to Patient -   Confirm Advance Directive Yes, on file   Patient Would Like to Complete Advance Directive -       Medical Interventions: Full interventions             Other:    As far as possible, the palliative care team has discussed with patient / health care proxy about goals of care / treatment preferences for patient. HISTORY:     History obtained from: chart, patient    CHIEF COMPLAINT: worsening pain LLE    HPI/SUBJECTIVE:    The patient is:   [x] Verbal and participatory  [] Non-participatory due to:     6/25: presents to ED with worsening infection, pain, and drainage from LLE wounds.     Clinical Pain Assessment (nonverbal scale for severity on nonverbal patients):   Clinical Pain Assessment  Severity: 9  Location: left foot  Character: sharp, dull achy, burning, shooting  Duration: days  Frequency: constant     Activity (Movement): Restless, excessive activity and/or withdrawal reflexes    Duration: for how long has pt been experiencing pain (e.g., 2 days, 1 month, years)  Frequency: how often pain is an issue (e.g., several times per day, once every few days, constant)     FUNCTIONAL ASSESSMENT:     Palliative Performance Scale (PPS):  PPS: 40       PSYCHOSOCIAL/SPIRITUAL SCREENING:     Palliative IDT has assessed this patient for cultural preferences / practices and a referral made as appropriate to needs (Cultural Services, Patient Advocacy, Ethics, etc.)    Any spiritual / Christian concerns:  [] Yes /  [x] No    Caregiver Burnout:  [] Yes /  [x] No /  [] No Caregiver Present      Anticipatory grief assessment:   [x] Normal  / [] Maladaptive       ESAS Anxiety: Anxiety: 0    ESAS Depression: Depression: 4        REVIEW OF SYSTEMS:     Positive and pertinent negative findings in ROS are noted above in HPI. The following systems were [x] reviewed / [] unable to be reviewed as noted in HPI  Other findings are noted below. Systems: constitutional, ears/nose/mouth/throat, respiratory, gastrointestinal, genitourinary, musculoskeletal, integumentary, neurologic, psychiatric, endocrine. Positive findings noted below. Modified ESAS Completed by: provider   Fatigue: 3 Drowsiness: 0   Depression: 4 Pain: 9   Anxiety: 0 Nausea: 0   Anorexia: 0 Dyspnea: 0     Constipation: No     Stool Occurrence(s): 1        PHYSICAL EXAM:     From RN flowsheet:  Wt Readings from Last 3 Encounters:   07/03/21 250 lb 1.8 oz (113.5 kg)   04/27/21 254 lb 9.6 oz (115.5 kg)   04/15/21 240 lb (108.9 kg)     Blood pressure 129/83, pulse 68, temperature 98.4 °F (36.9 °C), resp. rate 20, height 6' 1\" (1.854 m), weight 250 lb 1.8 oz (113.5 kg), SpO2 96 %.     Pain Scale 1: Visual  Pain Intensity 1: 2  Pain Onset 1: acute  Pain Location 1: Leg  Pain Orientation 1: Anterior  Pain Description 1: Aching, Burning, Constant  Pain Intervention(s) 1: Medication (see MAR)  Last bowel movement, if known:     Constitutional: AAOx3, NAD, moderately depressed (situational)  Eyes: pupils equal, anicteric  ENMT: no nasal discharge, moist mucous membranes  Cardiovascular: regular rhythm, distal pulses intact  Respiratory: breathing not labored, symmetric  Gastrointestinal: soft non-tender, +bowel sounds  Musculoskeletal: no deformity, no tenderness to palpation  Skin: warm, dry, LLE dressing with serous drainage  Neurologic: following commands, moving all extremities  Psychiatric: flat affect           HISTORY:     Active Problems:    Severe sepsis ([address] (6/25/2021)      Left leg cellulitis (6/25/2021)      Past Medical History:   Diagnosis Date    Aneurysm ([address]     (with stroke) brain    Bladder tumor     Cancer ([address]     bladder CA    Chronic pain     Family history of bladder cancer     GERD (gastroesophageal reflux disease)     Gout     History of vascular access device 04/25/2019    [facility] VAT 4 FR MIDLINE R Cephalic Limited access    History of vascular access device 04/26/2019    4 fr Midline right Cephalic midline access    Hypercholesterolemia     Hypertension     Lesion of bladder     Seizures ([address]     Stroke ([address] 2006    left sided weakness    Thromboembolus (HCC)     left leg    Thyroid disease     TIA (transient ischemic attack) 2012      Past Surgical History:   Procedure Laterality Date    HX ORTHOPAEDIC      R arthroscopy    HX OTHER SURGICAL      x2 L foot    HX UROLOGICAL  04/20/2018    Cystoscopy, TURBT (greater than 5 cm resected) Dr. Tahira Velazquez, [facility].  KNEE ARTHROSCP HARV      left knee    TRANSURETHRAL RESEC BLADDER NECK  08/10/2018      Family History   Problem Relation Age of Onset    Diabetes Father     Lung Disease Father     Diabetes Sister     Diabetes Brother     Cancer Paternal Grandfather         Bladder      History reviewed, no pertinent family history.   Social History     Tobacco Use    Smoking status: Current Every Day Smoker     Packs/day: 0.50    Smokeless tobacco: Never Used   Sermo Tobacco comment: instructed not to smoke 24 hrs prior surgery   Substance Use Topics    Alcohol use:  Yes     Alcohol/week: 6.0 standard drinks     Types: 6 Cans of beer per week     Comment: occasional     Allergies   Allergen Reactions    Sulfamethoxazole-Trimethoprim Rash     L eye and L hand    Ciprofloxacin Hives     Per pcp records    Codeine Hives     Tolerates dilaudid, oxycodone    Cymbalta [Duloxetine] Other (comments)     Confusion and memory loss    Lyrica [Pregabalin] Hives    Sulfa (Sulfonamide Antibiotics) Rash    Ultram [Tramadol] Hives    Vancomycin Shortness of Breath    Zosyn [Piperacillin-Tazobactam] Rash      Current Facility-Administered Medications   Medication Dose Route Frequency    lidocaine (XYLOCAINE) 4 % cream   Topical PRN    methylPREDNISolone (PF) (Solu-MEDROL) injection 40 mg  40 mg IntraVENous Q24H    oxyCODONE IR (ROXICODONE) tablet 20 mg  20 mg Oral Q4H PRN    colchicine tablet 0.6 mg  0.6 mg Oral DAILY    furosemide (LASIX) tablet 20 mg  20 mg Oral DAILY    lisinopriL (PRINIVIL, ZESTRIL) tablet 10 mg  10 mg Oral DAILY    triamcinolone acetonide (KENALOG) 0.1 % ointment   Topical BID    magnesium oxide (MAG-OX) tablet 400 mg  400 mg Oral BID    gabapentin (NEURONTIN) capsule 300 mg  300 mg Oral TID    allopurinoL (ZYLOPRIM) tablet 100 mg  100 mg Oral DAILY    colchicine tablet 0.6 mg  0.6 mg Oral BID PRN    diphenhydrAMINE (BENADRYL) capsule 25 mg  25 mg Oral Q6H PRN    naloxone (NARCAN) injection 0.2 mg  0.2 mg IntraVENous EVERY 2 MINUTES AS NEEDED    calcium carbonate (TUMS) chewable tablet 400 mg [elemental]  400 mg Oral TID PRN    sodium chloride (NS) flush 5-10 mL  5-10 mL IntraVENous PRN    sodium chloride (NS) flush 5-40 mL  5-40 mL IntraVENous Q8H    sodium chloride (NS) flush 5-40 mL  5-40 mL IntraVENous PRN    acetaminophen (TYLENOL) tablet 650 mg  650 mg Oral Q6H PRN    Or    acetaminophen (TYLENOL) suppository 650 mg  650 mg Rectal Q6H PRN  polyethylene glycol (MIRALAX) packet 17 g  17 g Oral DAILY PRN    ondansetron (ZOFRAN ODT) tablet 4 mg  4 mg Oral Q8H PRN    Or    ondansetron (ZOFRAN) injection 4 mg  4 mg IntraVENous Q6H PRN    enoxaparin (LOVENOX) injection 40 mg  40 mg SubCUTAneous DAILY    aspirin chewable tablet 81 mg  81 mg Oral DAILY    levothyroxine (SYNTHROID) tablet 125 mcg  125 mcg Oral ACB    melatonin tablet 3 mg  3 mg Oral QHS PRN    methadone (DOLOPHINE) 10 mg/mL concentrated solution 140 mg  140 mg Oral DAILY    tamsulosin (FLOMAX) capsule 0.4 mg  0.4 mg Oral DAILY    naloxone (NARCAN) injection 0.4 mg  0.4 mg IntraVENous PRN    famotidine (PEPCID) tablet 20 mg  20 mg Oral BID    L.acidophilus-paracasei-S.thermophil-bifidobacter (RISAQUAD) 8 billion cell capsule  1 Capsule Oral DAILY          LAB AND IMAGING FINDINGS:     Lab Results   Component Value Date/Time    WBC 9.7 07/02/2021 03:48 AM    HGB 9.7 (L) 07/02/2021 03:48 AM    PLATELET 579 92/07/5646 03:48 AM     Lab Results   Component Value Date/Time    Sodium 131 (L) 07/05/2021 04:55 AM    Potassium 4.0 07/05/2021 04:55 AM    Chloride 92 (L) 07/05/2021 04:55 AM    CO2 33 (H) 07/05/2021 04:55 AM    BUN 11 07/05/2021 04:55 AM    Creatinine 0.52 (L) 07/05/2021 04:55 AM    Calcium 8.5 07/05/2021 04:55 AM    Magnesium 1.7 07/05/2021 04:55 AM    Phosphorus 4.6 07/04/2021 01:34 AM      Lab Results   Component Value Date/Time    Alk.  phosphatase 115 07/02/2021 03:48 AM    Protein, total 5.5 (L) 07/02/2021 03:48 AM    Albumin 2.1 (L) 07/02/2021 03:48 AM    Globulin 3.4 07/02/2021 03:48 AM     Lab Results   Component Value Date/Time    INR 1.1 12/21/2017 11:28 AM    Prothrombin time 11.4 (H) 12/21/2017 11:28 AM    aPTT 29.3 12/21/2017 11:28 AM      No results found for: IRON, FE, TIBC, IBCT, PSAT, FERR   No results found for: PH, PCO2, PO2  No components found for: [address] [place] [address]   Lab Results   Component Value Date/Time    CK 60 04/25/2019 05:10 AM    CK - MB 1.1 12/21/2017 11:28 AM                Total time: 35  Counseling / coordination time, spent as noted above:40  > 50% counseling / coordination?: y    Prolonged service was provided for  []30 min   []75 min in face to face time in the presence of the patient, spent as noted above. Time Start:   Time End:   Note: this can only be billed with 97090 (initial) or 17608 (follow up). If multiple start / stop times, list each separately.

## 2021-07-07 NOTE — ADT AUTH CERT NOTES
Cellulitis - Care Day 12 (7/6/2021) by Deedee Balderrama       Review Status Review Entered   Completed 7/6/2021 15:17      Criteria Review      Care Day: 12 Care Date: 7/6/2021 Level of Care: Inpatient Floor    Guideline Day 3    Clinical Status    (X) * Hemodynamic stability    7/6/2021 15:17:24 EDT by Chauncey Hull      98.3, 82, 152/91, , 20, RA sat 96%    (X) * Afebrile or fever improved    7/6/2021 15:17:24 EDT by Milan Pacheco as doc    ( ) * Skin exam stable or improved    (X) * Mental status at baseline    7/6/2021 15:17:24 EDT by Chauncey Hull      baseline    ( ) * Antibiotic treatment needs appropriate for next level of care    7/6/2021 15:17:24 EDT by Milan Pacheco Per ID, IV abx x4 more days at a minimum    ( ) * Pain absent or manageable at next level of care    ( ) * Discharge plans and education understood    Activity    ( ) * Ambulatory or acceptable for next level of care    Routes    (X) * Oral hydration    7/6/2021 15:17:24 EDT by Chauncey Hull      po diet. ADD MEDS: probiotic po qd, dilaudid 4mg po q4H PRN x2 through 1500, neurontin 300mg po tid, lasix 20mg po qd, pepcid 20mg po bid, lovenox 40mg sq q24H, colchicine 0.6mg po qd, asa 81mg po qd, tylenol 650mg po q6H PRN x1 through 1500    ( ) * Oral medications or regimen acceptable for next level of care    7/6/2021 15:17:24 EDT by Chauncey Hull      Zofran 4mg IV Q6H PRN x1 through 1400, solumedrol 40mg IV Q12H, methadone 140mg po qd, Flomax 0.4mg po qd, kenalog bid BID topical, Roxicodone IR 10mg po q4H PRN x3 through 1400, prinivil 10mg po qd, mag ox 400mg po bid, synthroid 125mcg po qd    (X) * Oral diet or acceptable for next level of care    Medications    (X) Parenteral or oral antibiotics    7/6/2021 15:17:24 EDT by Chauncey Hull      Merrem 500mg IV Q6H and linezolid 600mg IV Q12H to be extended 4 more days, until 7/9 per ID note at 1430 today.  Total of 14 days    * Milestone   Additional Notes   Assessment: (INFECTIOUS DISEASE)   64 yo M with chronic non-healing left lower leg wound for about 2 years after injury from an HVAC unit. Biopsy pathology on 4/25/19 at [facility] showed overall non-specific findings but those that could be seen in Pyoderma gangrenosum.  Patient has a dermatologist Dr. Haley Lopes says he has been getting steroid injections into the wounds on about a monthly basis and this had somewhat started to help the wounds a little. Patient was also started on methotrexate 500mg BID by the dermatologist 2 months ago. He now presented for severe pain in the wounds. Afebrile but WBC 21.7 on arrival. Started on meropenem and linezolid based on most recent ID recommendations from April 2021.        Assessed to have bacterial superinfection of the left leg wounds. Wounds under dressing by wound care team; I reviewed the pictures of the wound from 6/28/21 in chart. ID consulted for abx choice and duration of therapy.                Recommendations:   - Patient had a recent biopsy of left shin in 05/2021 by his outpatient dermatologist Dr. Analia Liang which again supported the diagnosis of pyoderma gangrenosus. See Media for report.        - Patient is on IV steroids now per primary team.       - Patient is requesting a few more days of IV abx. I will extend date of meropenem and linezolid  to 4 more days, till 7/9/21. Total 14 days.        - If unable to obtain inpatient to inpatient transfer to [facility], then an urgent dermatological evaluation should be attempted to be set up at [facility]. This was discussed with CM today. If patient is going to be discharged to a Rehab or SNF facility, then this will facilitate transport issues with outpatient dermatological followup.  Please note, that patient's outpatient dermatologist and Infectious diseases both recommend patient get dermatological evaluation at an academic instituation.        - Unfortunately, the primary issue is the PG and patient is prone towards these infections and consistent optimum wound care is basic to reduce such complications and towards wound care itself. These infections are also making the primary skin condition worse.       - Appreciate wound care assistance. Physical Exam:   ? GEN: NAD   ? HEENT: Normocephalic, atraumatic, PERRL, no scleral icterus   ? CV: HDS   ? Lungs: room air   ? Abdomen: soft, non distended, obese   ? Genitourinary: , no [place]   ? Extremities: left lower leg wounds are in dressing   ? Neuro: Alert, oriented to time, place and situation, moves all extremities to commands, verbal    ? Skin: no rash   ? Psych: good affect, good eye contact, non tearful    ? Lines: PIVs       Cellulitis - Care Day 11 (7/5/2021) by Rick [facility]       Review Status Review Entered   Completed 7/5/2021 11:43      Criteria Review      Care Day: 11 Care Date: 7/5/2021 Level of Care: Inpatient Floor    Guideline Day 3    Level Of Care    ( ) Floor to discharge    7/5/2021 11:43:37 EDT by Samantha Camacho      surgical floor    Clinical Status    (X) * Hemodynamic stability    7/5/2021 11:43:37 EDT by Samantha Camacho      HR 82, 101/76, 20, RA sat 91%    (X) * Afebrile or fever improved    7/5/2021 11:43:37 EDT by Samantha Camacho      Tmax 98.3    ( ) * Skin exam stable or improved    7/5/2021 11:43:37 EDT by Samantha Camacho      complex daily wound care as documneted    (X) * Mental status at baseline    7/5/2021 11:43:37 EDT by Samantha Camacho      baseline    ( ) * Antibiotic treatment needs appropriate for next level of care    ( ) * Pain absent or manageable at next level of care    7/5/2021 11:43:37 EDT by Samantha Camacho      palliative care actively involved. Pain continues to be primary complaint. ( ) * Discharge plans and education understood    Activity    ( ) * Ambulatory or acceptable for next level of care    Routes    (X) * Oral hydration    7/5/2021 11:43:37 EDT by Samantha Camacho      po diet.  Add meds: dilaudid 4mg po q4H PRN x1 through 1130, neurontin 300mg po tid, pepecid 20mg po bid, lovenox 40mg sq qd, colchicine 0.6mg po bid prn x1 through 1130, asa 81mg po qd, synthroid 125mcg po qd    ( ) * Oral medications or regimen acceptable for next level of care    7/5/2021 11:43:37 EDT by Lorita Flax      Solumedrol 40mg IV Q12H, bumex 1mg IV BID dc for today, dolophine 140mg po qd, flomax 0.4mg po qd, kenalog cream bid to RLE itchy rash, Roxicodone IR 10mg po q4H PRN x1 through 1130, mag ox 400mg po bid, prinivil 10mg po qd (NEW), probiotic po qd    (X) * Oral diet or acceptable for next level of care    7/5/2021 11:43:37 EDT by Lorita Flax      po diet. Regular diet    Interventions    ( ) WBC    7/5/2021 11:43:37 EDT by Lorita Flax      LABS: Na 131, chl 92, co2 33, gluc 164, creat 0.52,    Medications    (X) Parenteral or oral antibiotics    7/5/2021 11:43:37 EDT by Lorita Flax      Merrem 0.5gram IV Q6H, Zyvox 600mg IV Q12H    * Milestone   Additional Notes   Daily wound care:    left foot / lower leg wounds:  Remove the old dressings,  coat the wound with the Lidocaine cream x 4 tubes and let it dwell for only 5 minutes and then proceed to the next step:  Spray the wound with Caraklenz dermal wound cleanser and WIPE the wound bed with gauze to remove the old drainage and wound debris.  Apply the Opticel Ag to the wound to cover the entire wound and then cover with High Drainage pack dressings x 2. Wrap with radha and tape to secure. Float the heel - the leg leg is paralyzed.       Assessment / Plan:   Severe sepsis POA resolved   Left leg necrotic ulcer due to prior trauma and pyoderma gangrenosum   Intractable leg pain   -has been following with Dr Tawana Montemayor (derm) since Nov 2020 and has tried multiple courses of oral steroids.  May need cyclosporin or humira.  Had wound debridement at [address] [place] [address] in 2020 which may have worsened the wounds in retrospect.         -Patient needs formal evaluation in an academic center. [facility] current social situation has remained a barrier for consistent follow up, wound care or subspecialty consultation. Jannie French has agreed to go to a SNF or NH if needed.         -Continue meropenem and Zyvox    -Continue wound care   -continue dilaudid IV for pain.  Already on high dose of methadone. Freddie oLpez is what he brings up all the time.  His situation has gotten worse over the past year. Anthony Spencer does not want any amputation but he wants to get better and pursue [facility] transfer if needed.   I will ask palliative care to help with emotional support and pain med adjustments       -called [facility] transfer center 7/01 and spoke with triage MD. [facility] is on [facility] and will only accept transfers for cardiac or strokes.   Will call back on 7/03 and 7/05.  If they have no beds by next week, will need to come up with another plan.          Addendum: 7/05. [facility] is still unable to accept non cardiac / stroke transfers. [facility] need to discuss alternative options with complex case manager tomorrow. Javier Harris was mention of the hospital trying to arrange for a virtual visit with [facility] dermatology.       LUE swelling, improved with diuresis   Gout   -LUE swelling has improved.  Hold diuretics today and restart low dose lasix tomorrow   -Colchicine not helping.  I am not convinced this is gout.  Started IV steroids yesterday and it has helped some.   Will check CRP and sed rate in AM, consider tapering steroids by tomorrow if not helping.         GERD   -pepcid bid       HTN   -continue lisinopril       Hypothyroid   -continue levothyroxine       Hx bladder cancer   Hx stroke from aneurysm with left sided weakness   -continue aspirin       Chronic methadone use: Continue home dose methadone        PHYSICAL EXAM:    General:          WD, WN. Alert, cooperative, no acute distress     EENT:              EOMI. Anicteric sclerae.  MMM   Resp:               CTA bilaterally, no wheezing or rales.  No accessory muscle use   CV:                  Regular  rhythm, LUE swelling and BLE edema   GI:                   Soft, Non distended, Non tender.  +Bowel sounds   Neurologic:       Alert and oriented X 3, normal speech,    Psych:   Good insight. Not anxious nor agitated   Skin:                Left leg: Large deep circumferential ulcer involving almost all skin below the mid sheen to the forefoot, yellowish fibrinous necrotic base with foul smelling odor, rolled out ulcer edge with minimal surrounding erythema

## 2021-07-07 NOTE — PROGRESS NOTES
I contacted [facility] transfer center and talked to triaging MD, who discussed with the on call hospitalist. [facility] to accept the patient at this time.

## 2021-07-07 NOTE — PROGRESS NOTES
End of Shift Note    Bedside shift change report given to [employer] (oncoming nurse) by Lauro Cota (offgoing nurse). Report included the following information SBAR, Kardex and MAR    Shift worked:  7a-7p     Shift summary and any significant changes:     wound dressing change per order. Pain meds changed: dilaudid d/c, diogo increased to 20mg q 4 hours. Concerns for physician to address:  none     Zone phone for oncoming shift:   [phone]       Activity:  Activity Level: Up with Assistance  Number times ambulated in hallways past shift: 0  Number of times OOB to chair past shift: 0    Cardiac:   Cardiac Monitoring: No      Cardiac Rhythm: Sinus Rhythm    Access:   Current line(s): PIV     Genitourinary:   Urinary status: voiding    Respiratory:   O2 Device: None (Room air)  Chronic home O2 use?: NO  Incentive spirometer at bedside: YES     GI:  Last Bowel Movement Date: 07/06/21  Current diet:  ADULT ORAL NUTRITION SUPPLEMENT Dinner, Breakfast; Wound Healing Supplement  ADULT DIET Regular; Semd 1-8oz bottle water every meal tray  ADULT ORAL NUTRITION SUPPLEMENT Breakfast, Dinner, Lunch; Low Calorie/High Protein  Passing flatus: YES  Tolerating current diet: YES       Pain Management:   Patient states pain is manageable on current regimen: YES    Skin:  Dejuan Score: 19  Interventions: increase time out of bed    Patient Safety:  Fall Score:  Total Score: 3  Interventions: assistive device (walker, cane, etc) and pt to call before getting OOB  High Fall Risk: Yes    Length of Stay:  Expected LOS: 3d 12h  Actual LOS: [address] [facility]

## 2021-07-07 NOTE — INTERDISCIPLINARY ROUNDS
Interdisciplinary Rounds were completed on 07/07/21 for this patient. Rounds included nursing, clinical care leader, pharmacy, and case management. Plan of care discussed. See clinical pathway and/or care plan for interventions and desired outcomes.

## 2021-07-07 NOTE — PROGRESS NOTES
End of Shift Note    Bedside shift change report given to Ridge Jansen (oncoming nurse) by Nghia Stubbs RN (offgoing nurse). Report included the following information SBAR, Kardex and Recent Results    Shift worked:  7p-7a     Shift summary and any significant changes:     Pt c/o severe pain after dressing change. Unable to get pain under control; request to NP Susi, see orders. Pt c/o about having different Nurses each day doing his dressing change. Pt also c/o his pain meds were not enough for his current condition . Concerns for physician to address:  Pt requests Pain Management Consult     Zone phone for oncoming shift:   [phone]       Activity:  Activity Level: Up with Assistance  Number times ambulated in hallways past shift: 0  Number of times OOB to chair past shift: 0    Cardiac:   Cardiac Monitoring: No      Cardiac Rhythm: Sinus Rhythm    Access:   Current line(s): PIV     Genitourinary:   Urinary status: voiding    Respiratory:   O2 Device: None (Room air)  Chronic home O2 use?: NO  Incentive spirometer at bedside: NO     GI:  Last Bowel Movement Date: 07/06/21  Current diet:  ADULT ORAL NUTRITION SUPPLEMENT Dinner, Breakfast; Wound Healing Supplement  ADULT DIET Regular; Semd 1-8oz bottle water every meal tray  ADULT ORAL NUTRITION SUPPLEMENT Breakfast, Dinner, Lunch; Low Calorie/High Protein  Passing flatus: YES  Tolerating current diet: YES       Pain Management:   Patient states pain is manageable on current regimen: NO    Skin:  Dejuan Score: 20  Interventions: increase time out of bed, limit briefs and nutritional support     Patient Safety:  Fall Score:  Total Score: 2  Interventions: assistive device (walker, cane, etc), pt to call before getting OOB and stay with me (per policy)  High Fall Risk: Yes    Length of Stay:  Expected LOS: 3d 12h  Actual LOS: 12      Nghia Stubbs, RN

## 2021-07-07 NOTE — PROGRESS NOTES
Problem: Risk for Spread of Infection  Goal: Prevent transmission of infectious organism to others  Description: Prevent the transmission of infectious organisms to other patients, staff members, and visitors. Outcome: Progressing Towards Goal     Problem: Patient Education:  Go to Education Activity  Goal: Patient/Family Education  Outcome: Progressing Towards Goal     Problem: Falls - Risk of  Goal: *Absence of Falls  Description: Document Segundo Rodgers Fall Risk and appropriate interventions in the flowsheet.   Outcome: Progressing Towards Goal  Note: Fall Risk Interventions:  Mobility Interventions: Utilize walker, cane, or other assistive device         Medication Interventions: Patient to call before getting OOB    Elimination Interventions: Patient to call for help with toileting needs    History of Falls Interventions: Room close to nurse's station         Problem: Patient Education: Go to Patient Education Activity  Goal: Patient/Family Education  Outcome: Progressing Towards Goal     Problem: Patient Education: Go to Patient Education Activity  Goal: Patient/Family Education  Outcome: Progressing Towards Goal     Problem: Sepsis: Day 6  Goal: Off Pathway (Use only if patient is Off Pathway)  Outcome: Progressing Towards Goal  Goal: *Oxygen saturation within defined limits  Outcome: Progressing Towards Goal  Goal: *Vital signs within defined limits  Outcome: Progressing Towards Goal  Goal: *Tolerating diet  Outcome: Progressing Towards Goal  Goal: *Demonstrates progressive activity  Outcome: Progressing Towards Goal  Goal: Influenza immunization  Outcome: Progressing Towards Goal  Goal: *Pneumococcal immunization  Outcome: Progressing Towards Goal  Goal: Activity/Safety  Outcome: Progressing Towards Goal  Goal: Diagnostic Test/Procedures  Outcome: Progressing Towards Goal  Goal: Nutrition/Diet  Outcome: Progressing Towards Goal  Goal: Discharge Planning  Outcome: Progressing Towards Goal  Goal: Medications  Outcome: Progressing Towards Goal  Goal: Respiratory  Outcome: Progressing Towards Goal  Goal: Treatments/Interventions/Procedures  Outcome: Progressing Towards Goal  Goal: Psychosocial  Outcome: Progressing Towards Goal     Problem: Sepsis: Discharge Outcomes  Goal: *Vital signs within defined limits  Outcome: Progressing Towards Goal  Goal: *Tolerating diet  Outcome: Progressing Towards Goal  Goal: *Verbalizes understanding and describes prescribed diet  Outcome: Progressing Towards Goal  Goal: *Demonstrates progressive activity  Outcome: Progressing Towards Goal  Goal: *Describes follow-up/return visits to physicians  Outcome: Progressing Towards Goal  Goal: *Verbalizes name, dosage, time, side effects, and number of days to continue medications  Outcome: Progressing Towards Goal  Goal: *Influenza immunization (Oct-Mar only)  Outcome: Progressing Towards Goal  Goal: *Pneumococcal immunization  Outcome: Progressing Towards Goal  Goal: *Lungs clear or at baseline  Outcome: Progressing Towards Goal  Goal: *Oxygen saturation returns to baseline or 90% or better on room air  Outcome: Progressing Towards Goal  Goal: *Glycemic control  Outcome: Progressing Towards Goal  Goal: *Absence of deep venous thrombosis signs and symptoms(Stroke Metric)  Outcome: Progressing Towards Goal  Goal: *Describes available resources and support systems  Outcome: Progressing Towards Goal  Goal: *Optimal pain control at patient's stated goal  Outcome: Progressing Towards Goal     Problem: Hypertension  Goal: *Blood pressure within specified parameters  Outcome: Progressing Towards Goal  Goal: *Fluid volume balance  Outcome: Progressing Towards Goal  Goal: *Labs within defined limits  Outcome: Progressing Towards Goal     Problem: Patient Education: Go to Patient Education Activity  Goal: Patient/Family Education  Outcome: Progressing Towards Goal     Problem: Pressure Injury - Risk of  Goal: *Prevention of pressure injury  Description: Document Dejuan Scale and appropriate interventions in the flowsheet.   Outcome: Progressing Towards Goal     Problem: Patient Education: Go to Patient Education Activity  Goal: Patient/Family Education  Outcome: Progressing Towards Goal     Problem: Pain  Goal: *Control of Pain  Outcome: Progressing Towards Goal  Goal: *PALLIATIVE CARE:  Alleviation of Pain  Outcome: Progressing Towards Goal     Problem: Patient Education: Go to Patient Education Activity  Goal: Patient/Family Education  Outcome: Progressing Towards Goal

## 2021-07-07 NOTE — WOUND CARE
Wound Care follow up for the Left foot and lower leg wounds - Pyoderma Gangrenosum wounds. For review: patient has had a brain injury and then a stroke. Pt. Is now on steroids to treat this skin condition. ID is also seeing him. We are using the 4% lidocaine cream to ease some of the topical pain. Patient's friend, Leticia Salinas does his wound care at home usually. Chart reviewed and patient assessed. Patient has been getting wound care done once per day for the past 1.5 weeks. At first he was hesitant to change his routine but now he is accepting of the new wound care materials we are using. Today the wound is starting to show some progress and some granulation buds in the wound bed. There is still some significant slough in the wound beds but the tendons are starting to cover on the sides. There is less slough on the foot today and there are granulation buds on the foot. The drainage is being managed well with the use of the high drainage Maxorb II Ag. Along with Silvasorb wound gel and the high drainage packs to insulate the wounds. Wound care to be done daily (about the same time every day):   1. Cleanse the wound with Caraklenz spray and wipe the wound firmly (he will protest but it needs a firm, slow wipe across the wound bed). 2. Apply the Silvasorb wound gel to the Maxorb dressings and apply them to the wounds. Cover with the high drainage pack contents and the large ABD behind the calf and in the front. Regular ABDs can be used for the foot. 3. Wrap all of it with two rolls of Kerlix to hold the dressing on the leg / foot and then elevate the leg. Make sure the toes are covered. Plan: will re-check patient next week. Highly recommend a referral to the Outpatient Wound center (currently in [facility]).    Brendon Banda RN, BSN, [employer]

## 2021-07-07 NOTE — PROGRESS NOTES
Transition of Care Plan:    RUR: 29%  Disposition: Possible SNF    Follow up appointments: Follow up with PCP and specialist   DME needed: TBD by therapist   Transportation at Discharge: Possible Medicaid transport or family   [place] or means to access home:  Family have keys      IM Medicare letter: N/A  Caregiver Contact: Shayla Trujillo (friend) [phone]  Discharge Caregiver contacted prior to discharge? Family will be contacted at the time of d/c.    UPDATE: 3:43PM    CM and Complex CM completed room visit with pt, to discuss recommendations and d/c needs and plans. Pt reported that he will like to return back to [facility], due to him being unable to get into his home, due to steps into main entrance. Pt reported that \"friend\" Anju Antunez, has assisted with wound care needs and bandage dressings. Pt reported that his wound care supplies is delivered to pt's home, via [employer], for dressing changes. CM informed pt wound care clinic (daily) and appointment for [facility] has been recommended at the time of d/c. Pt reported that Anju Antunez can assist with transport to and from appointments. CM will consult with wound care nurse to determine if daily outpatient wound care appointment will need to be arranged. CM scheduled appointment for [facility] for 7/9/21. CM will consult with physician to determine pt's d/c date. CM will continue to follow. JUVENCIO Wren, Ronn Mantilla        INITIAL NOTE: CM received call from pt's insurance provider care coordinator: Renny Munoz ([phone]). Eliot Montana informed CM that he can assist with pt d/c needs at the time of d/c depending on recommendations. CM informed Eliot Montana that she will be in contact with him if their is a need of additional services. CM will continue to follow.     JUVENCIO Wren, [address] [place] [address]

## 2021-07-07 NOTE — PROGRESS NOTES
Hospitalist Progress Note    NAME: Isael Coronel   :  1965   MRN:  [id number]       Assessment / Plan:  Severe sepsis POA resolved  Left leg necrotic ulcer due to prior trauma and pyoderma gangrenosum  Intractable leg pain    Has been following with Dr Marilee Samson (derm) since 2020 and has tried multiple courses of oral steroids. May need cyclosporin or humira. Had wound debridement at [facility] in  which may have worsened the wounds in retrospect.       -Patient needs formal evaluation in an academic center. His current social situation has remained a barrier for consistent follow up, wound care or subspecialty consultation. Patient has agreed to go to a SNF or NH if needed.       Continue meropenem and Zyvox, ID following. Continue wound care  Continue dilaudid IV for pain.     Prior hospitalist Dr. Lukasz Ashley called [facility] transfer center  and spoke with triage MD. And on  . [facility] is still unable to accept non cardiac / stroke transfers. Will try again today. Will f/u with CM regarding Dermatology virtual appointment. Palliative consulted for pain management     LUE swelling, improved with diuresis  Gout  -LUE swelling has improved. Was started on iv steroid, will taper. Doesn't look like gout. F/u CRP and ESR  Colchicine added     GERD  -pepcid bid     HTN  -continue lisinopril     Hypothyroid  -continue levothyroxine     Hx bladder cancer  Hx stroke from aneurysm with left sided weakness  -continue aspirin     Chronic methadone use: Continue home dose methadone      Obesity: BMI 31. Counseling about lifestyle modification provided        Body mass index is 31.66 kg/m². Estimated discharge date: TBD  Barriers: Needs transfer to academic center.     Code status: Full  Prophylaxis: Lovenox  Recommended Disposition: TBD     Subjective:     Chief Complaint / Reason for Physician Visit  Follow up for Left leg necrotic ulcer  He still complains of left upper extremity and leg pain.     Review of Systems:  Symptom Y/N Comments  Symptom Y/N Comments   Fever/Chills n   Chest Pain n    Poor Appetite n   Edema n    Cough    Abdominal Pain     Sputum    Joint Pain     SOB/BECK    Pruritis/Rash     Nausea/vomit    Tolerating PT/OT     Diarrhea    Tolerating Diet     Constipation    Other       Could NOT obtain due to:      Objective:     VITALS:   Last 24hrs VS reviewed since prior progress note. Most recent are:  Patient Vitals for the past 24 hrs:   Temp Pulse Resp BP SpO2   07/07/21 0755 98.4 °F (36.9 °C) 68 20 129/83 96 %   07/07/21 0518 98.5 °F (36.9 °C) 69 -- 131/75 96 %   07/06/21 2301 97.7 °F (36.5 °C) 75 20 138/72 97 %   07/06/21 2151 97.7 °F (36.5 °C) 72 22 136/77 94 %   07/06/21 1457 97.7 °F (36.5 °C) 84 20 112/86 94 %       Intake/Output Summary (Last 24 hours) at 7/7/2021 1426  Last data filed at 7/7/2021 0805  Gross per 24 hour   Intake --   Output 1250 ml   Net -1250 ml        I had a face to face encounter and independently examined this patient on 7/7/2021, as outlined below:  PHYSICAL EXAM:  General: WD, WN. Alert, cooperative, no acute distress    EENT:  EOMI. Anicteric sclerae. MMM  Resp:  CTA bilaterally, no wheezing or rales. No accessory muscle use  CV:  Regular  rhythm,  No edema  GI:  Soft, Non distended, Non tender. +Bowel sounds  Neurologic:  Alert and oriented X 3, normal speech,   Psych:   Good insight. Not anxious nor agitated  Skin:  Left lower ext bulky dressing.     Reviewed most current lab test results and cultures  YES  Reviewed most current radiology test results   YES  Review and summation of old records today    NO  Reviewed patient's current orders and MAR    YES  PMH/SH reviewed - no change compared to H&P  ________________________________________________________________________  Care Plan discussed with:    Comments   Patient y    Family      RN y    Care Manager     Consultant                        Multidiciplinary team rounds were held today with , nursing, pharmacist and clinical coordinator. Patient's plan of care was discussed; medications were reviewed and discharge planning was addressed. ________________________________________________________________________  Total NON critical care TIME:  35  Minutes    Total CRITICAL CARE TIME Spent:   Minutes non procedure based      Comments   >50% of visit spent in counseling and coordination of care     ________________________________________________________________________  Olivier Leslie MD     Procedures: see electronic medical records for all procedures/Xrays and details which were not copied into this note but were reviewed prior to creation of Plan. LABS:  I reviewed today's most current labs and imaging studies. Pertinent labs include:  No results for input(s): WBC, HGB, HCT, PLT, HGBEXT, HCTEXT, PLTEXT, HGBEXT, HCTEXT, PLTEXT in the last 72 hours.   Recent Labs     07/05/21  0455   *   K 4.0   CL 92*   CO2 33*   *   BUN 11   CREA 0.52*   CA 8.5   MG 1.7       Signed: Olivier Leslie MD

## 2021-07-08 PROCEDURE — 74011250637 HC RX REV CODE- 250/637: Performed by: NURSE PRACTITIONER

## 2021-07-08 PROCEDURE — 74011636637 HC RX REV CODE- 636/637: Performed by: STUDENT IN AN ORGANIZED HEALTH CARE EDUCATION/TRAINING PROGRAM

## 2021-07-08 PROCEDURE — 2709999900 HC NON-CHARGEABLE SUPPLY

## 2021-07-08 PROCEDURE — 74011000250 HC RX REV CODE- 250: Performed by: STUDENT IN AN ORGANIZED HEALTH CARE EDUCATION/TRAINING PROGRAM

## 2021-07-08 PROCEDURE — 80176 ASSAY OF LIDOCAINE: CPT

## 2021-07-08 PROCEDURE — 94760 N-INVAS EAR/PLS OXIMETRY 1: CPT

## 2021-07-08 PROCEDURE — 99231 SBSQ HOSP IP/OBS SF/LOW 25: CPT | Performed by: NURSE PRACTITIONER

## 2021-07-08 PROCEDURE — 74011250636 HC RX REV CODE- 250/636: Performed by: HOSPITALIST

## 2021-07-08 PROCEDURE — 74011250637 HC RX REV CODE- 250/637: Performed by: HOSPITALIST

## 2021-07-08 PROCEDURE — 74011250637 HC RX REV CODE- 250/637: Performed by: INTERNAL MEDICINE

## 2021-07-08 PROCEDURE — 77030041070 HC DRSG ALG MDII -A

## 2021-07-08 PROCEDURE — 74011250637 HC RX REV CODE- 250/637: Performed by: STUDENT IN AN ORGANIZED HEALTH CARE EDUCATION/TRAINING PROGRAM

## 2021-07-08 PROCEDURE — 65270000029 HC RM PRIVATE

## 2021-07-08 PROCEDURE — 36415 COLL VENOUS BLD VENIPUNCTURE: CPT

## 2021-07-08 RX ORDER — PREDNISONE 20 MG/1
40 TABLET ORAL
Qty: 3 TABLET | Refills: 0 | Status: SHIPPED | OUTPATIENT
Start: 2021-07-08 | End: 2021-07-15

## 2021-07-08 RX ORDER — PREDNISONE 20 MG/1
40 TABLET ORAL
Status: COMPLETED | OUTPATIENT
Start: 2021-07-08 | End: 2021-07-10

## 2021-07-08 RX ORDER — COLCHICINE 0.6 MG/1
1.2 TABLET ORAL DAILY
Status: DISCONTINUED | OUTPATIENT
Start: 2021-07-09 | End: 2021-07-15 | Stop reason: HOSPADM

## 2021-07-08 RX ADMIN — SODIUM CHLORIDE 10 ML: 9 INJECTION, SOLUTION INTRAMUSCULAR; INTRAVENOUS; SUBCUTANEOUS at 22:59

## 2021-07-08 RX ADMIN — COLCHICINE 0.6 MG: 0.6 TABLET, FILM COATED ORAL at 10:17

## 2021-07-08 RX ADMIN — GABAPENTIN 300 MG: 300 CAPSULE ORAL at 22:59

## 2021-07-08 RX ADMIN — ONDANSETRON 4 MG: 2 INJECTION INTRAMUSCULAR; INTRAVENOUS at 13:45

## 2021-07-08 RX ADMIN — Medication 1 CAPSULE: at 10:16

## 2021-07-08 RX ADMIN — ACETAMINOPHEN 650 MG: 325 TABLET ORAL at 05:21

## 2021-07-08 RX ADMIN — ONDANSETRON 4 MG: 2 INJECTION INTRAMUSCULAR; INTRAVENOUS at 22:59

## 2021-07-08 RX ADMIN — OXYCODONE 20 MG: 5 TABLET ORAL at 10:27

## 2021-07-08 RX ADMIN — ASPIRIN 81 MG: 81 TABLET, CHEWABLE ORAL at 10:16

## 2021-07-08 RX ADMIN — LISINOPRIL 10 MG: 10 TABLET ORAL at 10:18

## 2021-07-08 RX ADMIN — FUROSEMIDE 20 MG: 20 TABLET ORAL at 10:17

## 2021-07-08 RX ADMIN — LEVOTHYROXINE SODIUM 125 MCG: 0.12 TABLET ORAL at 05:21

## 2021-07-08 RX ADMIN — SODIUM CHLORIDE 5 ML: 9 INJECTION, SOLUTION INTRAMUSCULAR; INTRAVENOUS; SUBCUTANEOUS at 05:21

## 2021-07-08 RX ADMIN — GABAPENTIN 300 MG: 300 CAPSULE ORAL at 10:18

## 2021-07-08 RX ADMIN — OXYCODONE 20 MG: 5 TABLET ORAL at 06:47

## 2021-07-08 RX ADMIN — OXYCODONE 20 MG: 5 TABLET ORAL at 17:13

## 2021-07-08 RX ADMIN — LIDOCAINE: 40 CREAM TOPICAL at 17:14

## 2021-07-08 RX ADMIN — TAMSULOSIN HYDROCHLORIDE 0.4 MG: 0.4 CAPSULE ORAL at 10:17

## 2021-07-08 RX ADMIN — SODIUM CHLORIDE 10 ML: 9 INJECTION, SOLUTION INTRAMUSCULAR; INTRAVENOUS; SUBCUTANEOUS at 13:45

## 2021-07-08 RX ADMIN — METHADONE HYDROCHLORIDE 140 MG: 10 CONCENTRATE ORAL at 05:21

## 2021-07-08 RX ADMIN — OXYCODONE 20 MG: 5 TABLET ORAL at 02:19

## 2021-07-08 RX ADMIN — FAMOTIDINE 20 MG: 20 TABLET ORAL at 10:18

## 2021-07-08 RX ADMIN — PREDNISONE 40 MG: 20 TABLET ORAL at 17:13

## 2021-07-08 RX ADMIN — OXYCODONE 20 MG: 5 TABLET ORAL at 22:59

## 2021-07-08 RX ADMIN — GABAPENTIN 300 MG: 300 CAPSULE ORAL at 17:13

## 2021-07-08 RX ADMIN — MAGNESIUM OXIDE 400 MG (241.3 MG MAGNESIUM) TABLET 400 MG: TABLET at 17:13

## 2021-07-08 RX ADMIN — TRIAMCINOLONE ACETONIDE: 1 OINTMENT TOPICAL at 10:23

## 2021-07-08 RX ADMIN — MAGNESIUM OXIDE 400 MG (241.3 MG MAGNESIUM) TABLET 400 MG: TABLET at 10:18

## 2021-07-08 RX ADMIN — FAMOTIDINE 20 MG: 20 TABLET ORAL at 17:13

## 2021-07-08 RX ADMIN — TRIAMCINOLONE ACETONIDE: 1 OINTMENT TOPICAL at 17:18

## 2021-07-08 NOTE — DISCHARGE INSTRUCTIONS
HOSPITALIST DISCHARGE INSTRUCTIONS    NAME: Coleman Mills   :  1965   MRN:  [id number]     Date/Time:  2021 5:09 PM    ADMIT DATE: 2021     DISCHARGE DATE: 2021     DISCHARGE DIAGNOSIS:  Active Problems:    Severe sepsis ([address] (2021)      Left leg cellulitis (2021)     Pyoderma gangrenosum  Follow-up Information     Follow up With Specialties Details Why Contact Info    [facility]   Go on 2021 APPOINTMENT TIME: 10:15AM [address], [address] [facility] [address].   On 2021 THIS IS YOUR HOME HEALTH AGENCY. IF YOU DO NOT HEAR FROM THEM WITHIN 24-48HRS, PLEASE CONTACT THEM DIRECTLY [phone]    None    None ([phone]) Patient stated that they have no PCP      [facility] [address]  Schedule an appointment as soon as possible for a visit in 1 week  [address]  [address] [facility]  [phone]        Wound care to be done daily (about the same time every day):   1. Cleanse the wound with Caraklenz spray and wipe the wound firmly (he will protest but it needs a firm, slow wipe across the wound bed). 2. Apply the Silvasorb wound gel to the Maxorb dressings and apply them to the wounds. Cover with the high drainage pack contents and the large ABD behind the calf and in the front. Regular ABDs can be used for the foot. 3. Wrap all of it with two rolls of Kerlix to hold the dressing on the leg / foot and then elevate the leg. Make sure the toes are covered. MEDICATIONS:  As per medication reconciliation  list  · It is important that you take the medication exactly as they are prescribed. · Keep your medication in the bottles provided by the pharmacist and keep a list of the medication names, dosages, and times to be taken in your wallet. · Do not take other medications without consulting your doctor.      Pain Management: per above medications    What to do at Home    Recommended diet:  Cardiac Diet    Recommended activity: Activity as tolerated    If you have questions regarding the hospital related prescriptions or hospital related issues please call [facility] at . If you experience any of the following symptoms then please call your primary care physician or return to the emergency room if you cannot get hold of your doctor:  Fever, chills, nausea, vomiting, diarrhea, change in mentation, falling, bleeding, shortness of breath    Follow Up: Your dermatologist tomorrow      Information obtained by :  I understand that if any problems occur once I am at home I am to contact my physician. I understand and acknowledge receipt of the instructions indicated above.                                                                                                                                            Physician's or R.N.'s Signature                                                                  Date/Time                                                                                                                                              Patient or Representative Signature                                                          Date/Time

## 2021-07-08 NOTE — PROGRESS NOTES
Transition of Care Plan:     RUR: 29%  Disposition: Home with [address]    Follow up appointments: PCP, [facility] on 7/9/21 at 10:15 AM  DME needed: TBD by therapist   Transportation at Discharge: Emil Lilo or means to access home:  Family have keys      IM Medicare letter: N/A  Caregiver Contact: Iva Scott (friend) [phone]  Discharge Caregiver contacted prior to discharge? Unsuccessful attempts to contact caregiver on 7/8/21    Medicaid transportation arranged via [facility] ([phone]) for tomorrow's specialist appointment with [facility]. Trip reference # is [id number]. Pt will be picked up from friend's home ([address], [address]) around 8 AM and transported via wheelchair Corin Bunting to [facility] ([address], [address] [facility]) for 10:15 AM appointment time. Per representative, the trip cannot be guaranteed due to short notice request. Unit CM to call [facility] in the morning to check on status of ride.      Ailyn Underwood, [address]

## 2021-07-08 NOTE — PROGRESS NOTES
Palliative Medicine Consult  Ashish: [phone] ([phone])    Patient Name: Miranda López  YOB: 1965    Date of Initial Consult: 7/1/21  Reason for Consult: pain management  Requesting Provider: Williams Smiley MD   Primary Care Physician: None     SUMMARY:   Miranda López is a 63 y.o. with a past history of CVA, gout, seizures, prior DVT, GERD, bladder cancer, chronic nonhealing wound on his left lower extremity x 2 years, [facility] for chronic pain, who was admitted on 6/25/2021 from home with a diagnosis of severe sepsis, severe diffuse osteopenia. Continue Meropenuem and linezolid for total of 10 days (6/25/21 to 7/5/21). Should be ready for discharge 7/5/21. Current medical issues leading to Palliative Medicine involvement include: pain management for complex pain. Per chart review, first document of LLE cellulitis in August 2014, that started when an HVAC unit fell on his leg, with progression over the years to now. He has been admitted to multiple different hospitals including [address] [facility], [facility], [address] , [facility], [facility], he has been seen by multiple surgeons, plastic surgeon, all who have recommended amputation. Pt with significant history of no-show and appt rescheduling with his dermatologist.  Per infectious disease 4/2021,  , she has advised pt that continued antibiotic therapy would be futile and predispose to development of resistant organisms. Per ID note 6/28/21 Mendel Echols primary issue is the PG and patient is prone towards these infections and consistent optimum wound care is basic to reduce such complications and towards wound care itself. These infections are also making the primary skin condition worse. In addition, he is going to develop more and more resistant organisms down the line. \"  Attempts are being made to transfer pt to an academic center [facility]) for dermatology clinic. 24 hour opioid use:  7/3: neurontin 300mg, Methadone 140mg, dilaudid 12mg, oxycodone 40mg  7/4: neurontin 900mg, Methadone 140mg, dilaudid  8 mg, oxycodone 20mg, Fentanyl 25mcg  7/5: neurontin 900mg, Methadone 140mg, dilaudid 12mg, oxycodone 30mg  7/6: neurontin 900mg, Methadone 140mg, dilaudid 12mg, oxycodone 50mg, Fentanyl 25mcg  7/7: neurontin 900mg, Methadone 140mg, dilaudid 12mg, oxycodone 60mg  (dilaudid d/ferdinand)    Psychosocial: [facility] has [employer] [place]. Pt lives in a handicap hotel ([facility]) with his friend. Pt is wheelchair bound and requires assistance for his ADL/IADLs. Pt's friend drives him to his appointments. AMD on file, primary surrogate decision maker Maritza Mendoza ([phone])   PALLIATIVE DIAGNOSES:   1. Chronic pain: methadone 140mg daily in outpatient setting, gabapentin 100mg tid    2. Long-term current use of methadone for pain control  3. Opioid dependence  4. Chronic non-healing wound LLE  5. Depression  6. Physical debility   7. Left sided pain s/p stroke     PLAN:   1. Prior to visit, I completed a  review of patient's medical records, including medical documentation, vital signs, MARs, and results of various labs and other diagnostics. 1. Stopped dilaudid and continued oxycodone at a higher dose yesterday. Pt continues to c/o uncontrolled pain; during my visit he was able to get himself OOB to bathroom and back independently using a w/c with no objective signs of pain. 2. Continue oxycodone to 20mg q. 4 hours prn. Pt is aware this will not be continued after discharge and to use sparingly. 3. Continue Methadone, neurontin. 4. List of outpatient pain clinics provided to pt as requested. 5. Pt states bowels are regular. 2. Initial consult note routed to primary continuity provider and/or primary health care team members  3.  Communicated plan of care with: Palliative IDT, [address] Team     GOALS OF CARE / TREATMENT PREFERENCES:     GOALS OF CARE:  Patient/Health Care Proxy Stated Goals: Prolong life    TREATMENT PREFERENCES:   Code Status: Full Code    Advance Care Planning:  [] [facility] Interdisciplinary Team has updated the ACP Navigator with Health Care Decision Maker and Patient Capacity      Primary Decision Maker: Adeola Mae - Other Relative - [phone]  Advance Care Planning 6/25/2021   Patient's Healthcare Decision Maker is: -   Primary Decision Maker Name -   Primary Decision Maker Phone Number -   Primary Decision Maker Relationship to Patient -   Confirm Advance Directive Yes, on file   Patient Would Like to Complete Advance Directive -       Medical Interventions: Full interventions             Other:    As far as possible, the palliative care team has discussed with patient / health care proxy about goals of care / treatment preferences for patient. HISTORY:     History obtained from: chart, patient    CHIEF COMPLAINT: worsening pain LLE    HPI/SUBJECTIVE:    The patient is:   [x] Verbal and participatory  [] Non-participatory due to:     6/25: presents to ED with worsening infection, pain, and drainage from LLE wounds.     Clinical Pain Assessment (nonverbal scale for severity on nonverbal patients):   Clinical Pain Assessment  Severity: 5  Location: left foot  Character: sharp, dull achy, burning, shooting  Duration: days  Frequency: constant     Activity (Movement): Lying quietly, normal position    Duration: for how long has pt been experiencing pain (e.g., 2 days, 1 month, years)  Frequency: how often pain is an issue (e.g., several times per day, once every few days, constant)     FUNCTIONAL ASSESSMENT:     Palliative Performance Scale (PPS):  PPS: 40       PSYCHOSOCIAL/SPIRITUAL SCREENING:     Palliative IDT has assessed this patient for cultural preferences / practices and a referral made as appropriate to needs (Cultural Services, Patient Advocacy, Ethics, etc.)    Any spiritual / Mandaen concerns:  [] Yes /  [x] No    Caregiver Burnout:  [] Yes /  [x] No /  [] No Caregiver Present      Anticipatory grief assessment:   [x] Normal  / [] Maladaptive       ESAS Anxiety: Anxiety: 0    ESAS Depression: Depression: 3        REVIEW OF SYSTEMS:     Positive and pertinent negative findings in ROS are noted above in HPI. The following systems were [x] reviewed / [] unable to be reviewed as noted in HPI  Other findings are noted below. Systems: constitutional, ears/nose/mouth/throat, respiratory, gastrointestinal, genitourinary, musculoskeletal, integumentary, neurologic, psychiatric, endocrine. Positive findings noted below. Modified ESAS Completed by: provider   Fatigue: 3 Drowsiness: 0   Depression: 3 Pain: 5   Anxiety: 0 Nausea: 0   Anorexia: 0 Dyspnea: 0     Constipation: No     Stool Occurrence(s): 1        PHYSICAL EXAM:     From RN flowsheet:  Wt Readings from Last 3 Encounters:   07/03/21 250 lb 1.8 oz (113.5 kg)   04/27/21 254 lb 9.6 oz (115.5 kg)   04/15/21 240 lb (108.9 kg)     Blood pressure (!) 140/83, pulse 84, temperature 98.3 °F (36.8 °C), resp. rate 18, height 6' 1\" (1.854 m), weight 250 lb 1.8 oz (113.5 kg), SpO2 99 %.     Pain Scale 1: Numeric (0 - 10)  Pain Intensity 1: 9  Pain Onset 1: chronic  Pain Location 1: Leg, Foot  Pain Orientation 1: Left  Pain Description 1: Aching  Pain Intervention(s) 1: Medication (see MAR)  Last bowel movement, if known:     Constitutional: AAOx3, NAD, OOB independently using w/c  Eyes: pupils equal, anicteric  ENMT: no nasal discharge, moist mucous membranes  Cardiovascular: regular rhythm, distal pulses intact  Respiratory: breathing not labored, symmetric  Gastrointestinal: soft non-tender, +bowel sounds  Musculoskeletal: no deformity, no tenderness to palpation  Skin: warm, dry, LLE dressing with serous drainage  Neurologic: following commands, moving all extremities  Psychiatric: flat affect           HISTORY:     Active Problems:    Severe sepsis ([address] (6/25/2021)      Left leg cellulitis (6/25/2021)      Past Medical History:   Diagnosis Date    Aneurysm ([address]     (with stroke) brain    Bladder tumor     Cancer ([address]     bladder CA    Chronic pain     Family history of bladder cancer     GERD (gastroesophageal reflux disease)     Gout     History of vascular access device 04/25/2019    [facility] VAT 4 FR MIDLINE R Cephalic Limited access    History of vascular access device 04/26/2019    4 fr Midline right Cephalic midline access    Hypercholesterolemia     Hypertension     Lesion of bladder     Seizures ([address]     Stroke ([address] 2006    left sided weakness    Thromboembolus ([address]     left leg    Thyroid disease     TIA (transient ischemic attack) 2012      Past Surgical History:   Procedure Laterality Date    HX ORTHOPAEDIC      R arthroscopy    HX OTHER SURGICAL      x2 L foot    HX UROLOGICAL  04/20/2018    Cystoscopy, TURBT (greater than 5 cm resected) Dr. Harpal Garcia, [facility].  KNEE ARTHROSCP HARV      left knee    TRANSURETHRAL RESEC BLADDER NECK  08/10/2018      Family History   Problem Relation Age of Onset    Diabetes Father     Lung Disease Father     Diabetes Sister     Diabetes Brother     Cancer Paternal Grandfather         Bladder      History reviewed, no pertinent family history. Social History     Tobacco Use    Smoking status: Current Every Day Smoker     Packs/day: 0.50    Smokeless tobacco: Never Used    Tobacco comment: instructed not to smoke 24 hrs prior surgery   Substance Use Topics    Alcohol use:  Yes     Alcohol/week: 6.0 standard drinks     Types: 6 Cans of beer per week     Comment: occasional     Allergies   Allergen Reactions    Sulfamethoxazole-Trimethoprim Rash     L eye and L hand    Ciprofloxacin Hives     Per pcp records    Codeine Hives     Tolerates dilaudid, oxycodone    Cymbalta [Duloxetine] Other (comments)     Confusion and memory loss    Lyrica [Pregabalin] Hives  Sulfa (Sulfonamide Antibiotics) Rash    Ultram [Tramadol] Hives    Vancomycin Shortness of Breath    Zosyn [Piperacillin-Tazobactam] Rash      Current Facility-Administered Medications   Medication Dose Route Frequency    predniSONE (DELTASONE) tablet 40 mg  40 mg Oral PCD    lidocaine (XYLOCAINE) 4 % cream   Topical PRN    oxyCODONE IR (ROXICODONE) tablet 20 mg  20 mg Oral Q4H PRN    colchicine tablet 0.6 mg  0.6 mg Oral DAILY    furosemide (LASIX) tablet 20 mg  20 mg Oral DAILY    lisinopriL (PRINIVIL, ZESTRIL) tablet 10 mg  10 mg Oral DAILY    triamcinolone acetonide (KENALOG) 0.1 % ointment   Topical BID    magnesium oxide (MAG-OX) tablet 400 mg  400 mg Oral BID    gabapentin (NEURONTIN) capsule 300 mg  300 mg Oral TID    allopurinoL (ZYLOPRIM) tablet 100 mg  100 mg Oral DAILY    colchicine tablet 0.6 mg  0.6 mg Oral BID PRN    diphenhydrAMINE (BENADRYL) capsule 25 mg  25 mg Oral Q6H PRN    naloxone (NARCAN) injection 0.2 mg  0.2 mg IntraVENous EVERY 2 MINUTES AS NEEDED    calcium carbonate (TUMS) chewable tablet 400 mg [elemental]  400 mg Oral TID PRN    sodium chloride (NS) flush 5-10 mL  5-10 mL IntraVENous PRN    sodium chloride (NS) flush 5-40 mL  5-40 mL IntraVENous Q8H    sodium chloride (NS) flush 5-40 mL  5-40 mL IntraVENous PRN    acetaminophen (TYLENOL) tablet 650 mg  650 mg Oral Q6H PRN    Or    acetaminophen (TYLENOL) suppository 650 mg  650 mg Rectal Q6H PRN    polyethylene glycol (MIRALAX) packet 17 g  17 g Oral DAILY PRN    ondansetron (ZOFRAN ODT) tablet 4 mg  4 mg Oral Q8H PRN    Or    ondansetron (ZOFRAN) injection 4 mg  4 mg IntraVENous Q6H PRN    enoxaparin (LOVENOX) injection 40 mg  40 mg SubCUTAneous DAILY    aspirin chewable tablet 81 mg  81 mg Oral DAILY    levothyroxine (SYNTHROID) tablet 125 mcg  125 mcg Oral ACB    melatonin tablet 3 mg  3 mg Oral QHS PRN    methadone (DOLOPHINE) 10 mg/mL concentrated solution 140 mg  140 mg Oral DAILY    tamsulosin (FLOMAX) capsule 0.4 mg  0.4 mg Oral DAILY    naloxone (NARCAN) injection 0.4 mg  0.4 mg IntraVENous PRN    famotidine (PEPCID) tablet 20 mg  20 mg Oral BID    L.acidophilus-paracasei-S.thermophil-bifidobacter (RISAQUAD) 8 billion cell capsule  1 Capsule Oral DAILY          LAB AND IMAGING FINDINGS:     Lab Results   Component Value Date/Time    WBC 9.7 07/02/2021 03:48 AM    HGB 9.7 (L) 07/02/2021 03:48 AM    PLATELET 358 79/19/9371 03:48 AM     Lab Results   Component Value Date/Time    Sodium 131 (L) 07/05/2021 04:55 AM    Potassium 4.0 07/05/2021 04:55 AM    Chloride 92 (L) 07/05/2021 04:55 AM    CO2 33 (H) 07/05/2021 04:55 AM    BUN 11 07/05/2021 04:55 AM    Creatinine 0.52 (L) 07/05/2021 04:55 AM    Calcium 8.5 07/05/2021 04:55 AM    Magnesium 1.7 07/05/2021 04:55 AM    Phosphorus 4.6 07/04/2021 01:34 AM      Lab Results   Component Value Date/Time    Alk. phosphatase 115 07/02/2021 03:48 AM    Protein, total 5.5 (L) 07/02/2021 03:48 AM    Albumin 2.1 (L) 07/02/2021 03:48 AM    Globulin 3.4 07/02/2021 03:48 AM     Lab Results   Component Value Date/Time    INR 1.1 12/21/2017 11:28 AM    Prothrombin time 11.4 (H) 12/21/2017 11:28 AM    aPTT 29.3 12/21/2017 11:28 AM      No results found for: IRON, FE, TIBC, IBCT, PSAT, FERR   No results found for: PH, PCO2, PO2  No components found for: [place]   Lab Results   Component Value Date/Time    CK 60 04/25/2019 05:10 AM    CK - MB 1.1 12/21/2017 11:28 AM                Total time: 20  Counseling / coordination time, spent as noted above:15  > 50% counseling / coordination?: y    Prolonged service was provided for  []30 min   []75 min in face to face time in the presence of the patient, spent as noted above. Time Start:   Time End:   Note: this can only be billed with 96918 (initial) or 54021 (follow up). If multiple start / stop times, list each separately.

## 2021-07-08 NOTE — PROGRESS NOTES
1400: have been trying to get pts wound care done since lunch time. Pt has been delaying this process. Has been on phone, needing to go to the bathroom, not ready at this time. Pt aware it needs to be done and will let me know once hes finally ready. 1430:  Pt was told he is being discharged by MD and . Pt got upset and said he didn't have enough time. Pts ride can not get here tonight and he said she would be here some time in the morning as soon as she can but does not know what time. Pt is aware he has an appt scheduled for tomorrow morning at 10:15.  tried to come speak with pt and contact his friend Chinmay Spear for transportation. Pt is not in agreement. 1800: completed pts wound care, however when taking off bandages pt said his wound has gotten worse \"oh no that's not good, I need to get her in here to see this, I knew we shouldn't have changed the dressing\". Pt says hes tendon is sticking out more. Wound care took 30 mins to complete on pt.

## 2021-07-08 NOTE — DISCHARGE SUMMARY
Hospitalist Discharge Summary     Patient ID:  Merary Rose  [id number]  47 y.o.  1965 6/25/2021    PCP on record: None    Admit date: 6/25/2021  Discharge date and time: 7/8/2021    DISCHARGE DIAGNOSIS:  Sepsis d/t cellulitis  Pyoderma gangrenosum       CONSULTATIONS:  IP CONSULT TO HOSPITALIST  IP CONSULT TO ORTHOPEDIC SURGERY  IP CONSULT TO RHEUMATOLOGY  IP CONSULT TO INFECTIOUS DISEASES  IP CONSULT TO PALLIATIVE CARE - PROVIDER  IP CONSULT TO PAIN MANAGEMENT    Excerpted HPI from H&P of Moses Lee Darío is a 63 y.o. male who has chronic ulceration left lower leg and foot after water heater fell on it 2 years ago. Also diagnosed with pyoderma gangrenosum by biopsy. Past 5 days with fever 101, chills, nausea and vomiting. Past 2 days severe pain in left leg.       Hospitalized 4/21 for left leg infection. Put on zosyn and merrem by ID for wound culture growing out pseudomonas, ESBL E. Coli and enterofacaelis. MRI left lower leg without osteomyelitis. Seen by general surgeon and then orthopedic surgeon and amputation (through the knee vs AKA) recommended but patient would not make a decision. ______________________________________________________________________  DISCHARGE SUMMARY/HOSPITAL COURSE:  for full details see H&P, daily progress notes, labs, consult notes.      Severe sepsis POA resolved  Left leg necrotic ulcer due to prior trauma and pyoderma gangrenosum  Intractable leg pain     Has been following with Dr Paul Tamez (derm) since Nov 2020 and has tried multiple courses of oral steroids.  May need cyclosporin or humira.  Had wound debridement at [facility] in 2020 which may have worsened the wounds in retrospect.       -Patient needs formal evaluation in an academic center. [facility] current social situation has remained a barrier for consistent follow up, wound care or subspecialty consultation.    After multiple attempts to transfer to [facility], no accepting non cardia/stroke transfer yet  CM able to set up appointment with Dermatology clinic for tomorrow morning, this will be the best option form him at this time. Discussed with ID, no need for further abx  Wound care instruction as per wound care team     LUE swelling, improved with diuresis  Gout  -LUE swelling has improved.    Switched to po steroid, will give for 3 more days  Resume home meds     GERD  HTN  Hypothyroid  Hx bladder cancer  Hx stroke from aneurysm with left sided weakness  Resume home meds     Chronic methadone use: Continue home dose methadone     D/c home with home health  _______________________________________________________________________  Patient seen and examined by me on discharge day. Pertinent Findings:  Gen:    Not in distress  Chest: Clear lungs  CVS:   Regular rhythm. No edema  Abd:  Soft, not distended, not tender  Neuro:  Alert,   _______________________________________________________________________  DISCHARGE MEDICATIONS:   Current Discharge Medication List      START taking these medications    Details   predniSONE (DELTASONE) 20 mg tablet Take 40 mg by mouth daily (after dinner). Qty: 3 Tablet, Refills: 0  Start date: 7/8/2021         CONTINUE these medications which have NOT CHANGED    Details   levothyroxine (SYNTHROID) 125 mcg tablet Take 125 mcg by mouth Daily (before breakfast). mycophenolate (CELLCEPT) 500 mg tablet Take 500 mg by mouth two (2) times a day. ibuprofen (MOTRIN) 200 mg tablet Take 400 mg by mouth every six (6) hours as needed for Pain. colchicine 0.6 mg tablet Take 0.6 mg by mouth daily as needed for Gout or Pain.  Indications: acute inflammation of the joints due to gout attack      tamsulosin (FLOMAX) 0.4 mg capsule Take 1 capsule by mouth daily after dinner   Qty: 30 Cap, Refills: 12    Associated Diagnoses: Benign localized prostatic hyperplasia with lower urinary tract symptoms (LUTS)      lidocaine (XYLOCAINE) 4 % topical cream Apply  to affected area as needed for Pain (apply to left lower leg wounds during dressing changes). Qty: 5 Tube, Refills: 1      gabapentin (NEURONTIN) 100 mg capsule Take 100 mg by mouth three (3) times daily. lisinopriL (PRINIVIL, ZESTRIL) 20 mg tablet Take 20 mg by mouth daily. aspirin 81 mg chewable tablet Take 81 mg by mouth daily. pantoprazole (PROTONIX) 40 mg tablet Take 40 mg by mouth daily as needed. allopurinol (ZYLOPRIM) 100 mg tablet Take 1 Tab by mouth daily. Indications: treatment to prevent acute gout attack  Qty: 30 Tab, Refills: 0      senna-docusate (DARELL-COLACE) 8.6-50 mg per tablet Take 1 Tablet by mouth daily. Indications: constipation      methadone (DOLOPHINE) 10 mg/mL solution Take 140 mg by mouth daily. Indications: excessive pain      melatonin 3 mg tablet Take 1 Tab by mouth nightly as needed. Qty: 10 Tab, Refills: 0      varenicline (Chantix Starting Month Box) 0.5 mg (11)- 1 mg (42) DsPk Take  by mouth. Take one tablet by mouth in morning with food for 3 days then increase to 1 tablet by mouth twice daily with food thereafter as directed   Indications: stop smoking               Patient Follow Up Instructions: Activity: Activity as tolerated  Diet: Cardiac Diet  Wound Care: As directed    Follow-up with Dermatology  Follow-up Information     Follow up With Specialties Details Why Contact Info    [facility] Dermotology   Go on 7/9/2021 APPOINTMENT TIME: 10:15AM [address], [address] [address].   On 7/7/2021 THIS  [address]   IF YOU DO NOT HEAR FROM THEM WITHIN 24-48HRS, PLEASE CONTACT THEM DIRECTLY [phone]    None    None ([phone]) Patient stated that they have no PCP      [address] [place] [address] Dr Crane  Schedule an appointment as soon as possible for a visit in 1 week  [address]  [address] [place] [facility]  [phone] ________________________________________________________________    Risk of deterioration: Moderate    Condition at Discharge:  Stable  __________________________________________________________________    Disposition  Home with family and home health services    ____________________________________________________________________    Code Status: Full Code  ___________________________________________________________________      Total time in minutes spent coordinating this discharge (includes going over instructions, follow-up, prescriptions, and preparing report for sign off to her PCP) :  >30 minutes    Signed:  Yamileth Castillo MD

## 2021-07-08 NOTE — PROGRESS NOTES
Transition of Care Plan:    RUR: 29%  Disposition: Possible SNF    Follow up appointments: Follow up with PCP and specialist   DME needed: TBD by therapist   Transportation at Discharge: Possible Medicaid transport or family   [place] or means to access home:  Family have keys      IM Medicare letter: N/A  Caregiver Contact: Laura Recinos (friend) [phone]  Discharge Caregiver contacted prior to discharge? Family will be contacted at the time of d/c.    UPDATE: 3:21PM    CM informed that [facility] can accept pt. CM currently waiting for Venkata to contact CM. JUVENCIO Michel, [place]        INITIAL NOTE: CM informed physician that [facility] appointment was able to be scheduled for pt on 7/9/21. Physician informed CM that pt will be ready to d/c on today, and will require [address] at the time of d/c. CM will arrange [address] for pt home on today. CM will contact pt's friend \" Xavier Khan" to inform her of pt's d/c.    CM made attempts with David Cole, via telephone to inform her of the following. However, attempts were unsuccessful and CM left voicemail requesting a return call. David Cole contacted CM, via telephone and stated \"I dont have a family member in the hospital\" and hung up. CM attempted to contact David Cole back and was sent to voicemail. CM will continue to follow.     JUVENCIO Michel, [address] [place] [address]

## 2021-07-08 NOTE — PROGRESS NOTES
Hospitalist Progress Note    NAME: Amie Parra   :  1965   MRN:  [id number]       Assessment / Plan:  Severe sepsis POA resolved  Left leg necrotic ulcer due to prior trauma and pyoderma gangrenosum  Intractable leg pain    Has been following with Dr Dawna Galeazzi (derm) since 2020 and has tried multiple courses of oral steroids. May need cyclosporin or humira. Had wound debridement at [facility] in  which may have worsened the wounds in retrospect.       -Patient needs formal evaluation in an academic center. His current social situation has remained a barrier for consistent follow up, wound care or subspecialty consultation. Patient has agreed to go to a SNF or NH if needed.       Continue meropenem and Zyvox, ID following. Continue wound care  Appreciate palliative team help for pain management.     I called [facility] transfer center on , spoke with Triage MD, still not accepting non cardiac/stroke transfer. Was also tried twice by prior hospitalist on  and   Will discuss regarding virtual dermatology consultation     LUE swelling, improved with diuresis  Gout  -LUE swelling has improved. Switched to po steroid, stop in 3 days Doesn't look like gout. Colchicine added as per his request.     GERD  -pepcid bid     HTN  -continue lisinopril     Hypothyroid  -continue levothyroxine     Hx bladder cancer  Hx stroke from aneurysm with left sided weakness  -continue aspirin     Chronic methadone use: Continue home dose methadone      Obesity: BMI 31. Counseling about lifestyle modification provided        Body mass index is 31.66 kg/m². Estimated discharge date: TBD  Barriers: Needs transfer to academic center.     Code status: Full  Prophylaxis: Lovenox  Recommended Disposition: TBD     Subjective:     Chief Complaint / Reason for Physician Visit  Follow up for Left leg necrotic ulcer  He mentions his pain in his LUE is better.     Review of Systems:  Symptom Y/N Comments  Symptom Y/N Comments Fever/Chills n   Chest Pain n    Poor Appetite n   Edema n    Cough    Abdominal Pain     Sputum    Joint Pain     SOB/BECK    Pruritis/Rash     Nausea/vomit    Tolerating PT/OT     Diarrhea    Tolerating Diet     Constipation    Other       Could NOT obtain due to:      Objective:     VITALS:   Last 24hrs VS reviewed since prior progress note. Most recent are:  Patient Vitals for the past 24 hrs:   Temp Pulse Resp BP SpO2   07/08/21 1113 98.3 °F (36.8 °C) 84 18 (!) 140/83 99 %   07/08/21 0754 98.1 °F (36.7 °C) 96 18 (!) 131/99 95 %   07/07/21 2308 99.2 °F (37.3 °C) 90 19 101/60 96 %   07/07/21 1919 98 °F (36.7 °C) 70 19 (!) 114/90 97 %   07/07/21 1620 98.4 °F (36.9 °C) 71 19 (!) 130/99 94 %       Intake/Output Summary (Last 24 hours) at 7/8/2021 1340  Last data filed at 7/8/2021 1258  Gross per 24 hour   Intake 800 ml   Output 1400 ml   Net -600 ml        I had a face to face encounter and independently examined this patient on 7/8/2021, as outlined below:  PHYSICAL EXAM:  General: WD, WN. Alert, cooperative, no acute distress    EENT:  EOMI. Anicteric sclerae. MMM  Resp:  CTA bilaterally, no wheezing or rales. No accessory muscle use  CV:  Regular  rhythm,  No edema  GI:  Soft, Non distended, Non tender. +Bowel sounds  Neurologic:  Alert and oriented X 3, normal speech, Left upper and lower ext weakness  Psych:   Good insight. Not anxious nor agitated  Skin:  Left lower ext bulky dressing.     Reviewed most current lab test results and cultures  YES  Reviewed most current radiology test results   YES  Review and summation of old records today    NO  Reviewed patient's current orders and MAR    YES  PMH/SH reviewed - no change compared to H&P  ________________________________________________________________________  Care Plan discussed with:    Comments   Patient y    Family      RN y    Care Manager     Consultant                        Multidiciplinary team rounds were held today with , nursing, pharmacist and clinical coordinator. Patient's plan of care was discussed; medications were reviewed and discharge planning was addressed. ________________________________________________________________________  Total NON critical care TIME:  35  Minutes    Total CRITICAL CARE TIME Spent:   Minutes non procedure based      Comments   >50% of visit spent in counseling and coordination of care     ________________________________________________________________________  Olivier Leslie MD     Procedures: see electronic medical records for all procedures/Xrays and details which were not copied into this note but were reviewed prior to creation of Plan. LABS:  I reviewed today's most current labs and imaging studies. Pertinent labs include:  No results for input(s): WBC, HGB, HCT, PLT, HGBEXT, HCTEXT, PLTEXT, HGBEXT, HCTEXT, PLTEXT in the last 72 hours. No results for input(s): NA, K, CL, CO2, GLU, BUN, CREA, CA, MG, PHOS, ALB, TBIL, TBILI, ALT, INR, INREXT, INREXT in the last 72 hours.     No lab exists for component: SGOT    Signed: Olivier Leslie MD

## 2021-07-09 LAB — LIDOCAIN SERPL-MCNC: <1 UG/ML

## 2021-07-09 PROCEDURE — 65270000029 HC RM PRIVATE

## 2021-07-09 PROCEDURE — 74011250637 HC RX REV CODE- 250/637: Performed by: INTERNAL MEDICINE

## 2021-07-09 PROCEDURE — 74011636637 HC RX REV CODE- 636/637: Performed by: STUDENT IN AN ORGANIZED HEALTH CARE EDUCATION/TRAINING PROGRAM

## 2021-07-09 PROCEDURE — 94760 N-INVAS EAR/PLS OXIMETRY 1: CPT

## 2021-07-09 PROCEDURE — 74011250637 HC RX REV CODE- 250/637: Performed by: NURSE PRACTITIONER

## 2021-07-09 PROCEDURE — 74011250637 HC RX REV CODE- 250/637: Performed by: HOSPITALIST

## 2021-07-09 PROCEDURE — 2709999900 HC NON-CHARGEABLE SUPPLY

## 2021-07-09 PROCEDURE — 77030041076 HC DRSG AG OPTICELL MDII -A

## 2021-07-09 PROCEDURE — 74011250637 HC RX REV CODE- 250/637: Performed by: STUDENT IN AN ORGANIZED HEALTH CARE EDUCATION/TRAINING PROGRAM

## 2021-07-09 PROCEDURE — 74011000250 HC RX REV CODE- 250: Performed by: STUDENT IN AN ORGANIZED HEALTH CARE EDUCATION/TRAINING PROGRAM

## 2021-07-09 RX ADMIN — ACETAMINOPHEN 650 MG: 325 TABLET ORAL at 01:26

## 2021-07-09 RX ADMIN — GABAPENTIN 300 MG: 300 CAPSULE ORAL at 21:02

## 2021-07-09 RX ADMIN — GABAPENTIN 300 MG: 300 CAPSULE ORAL at 16:42

## 2021-07-09 RX ADMIN — OXYCODONE 20 MG: 5 TABLET ORAL at 03:26

## 2021-07-09 RX ADMIN — GABAPENTIN 300 MG: 300 CAPSULE ORAL at 09:44

## 2021-07-09 RX ADMIN — FAMOTIDINE 20 MG: 20 TABLET ORAL at 09:45

## 2021-07-09 RX ADMIN — SODIUM CHLORIDE 10 ML: 9 INJECTION, SOLUTION INTRAMUSCULAR; INTRAVENOUS; SUBCUTANEOUS at 21:02

## 2021-07-09 RX ADMIN — MAGNESIUM OXIDE 400 MG (241.3 MG MAGNESIUM) TABLET 400 MG: TABLET at 09:44

## 2021-07-09 RX ADMIN — SODIUM CHLORIDE 10 ML: 9 INJECTION, SOLUTION INTRAMUSCULAR; INTRAVENOUS; SUBCUTANEOUS at 05:34

## 2021-07-09 RX ADMIN — LISINOPRIL 10 MG: 10 TABLET ORAL at 09:45

## 2021-07-09 RX ADMIN — TRIAMCINOLONE ACETONIDE: 1 OINTMENT TOPICAL at 18:09

## 2021-07-09 RX ADMIN — OXYCODONE 20 MG: 5 TABLET ORAL at 20:57

## 2021-07-09 RX ADMIN — TAMSULOSIN HYDROCHLORIDE 0.4 MG: 0.4 CAPSULE ORAL at 09:44

## 2021-07-09 RX ADMIN — TRIAMCINOLONE ACETONIDE: 1 OINTMENT TOPICAL at 09:47

## 2021-07-09 RX ADMIN — ASPIRIN 81 MG: 81 TABLET, CHEWABLE ORAL at 09:44

## 2021-07-09 RX ADMIN — OXYCODONE 20 MG: 5 TABLET ORAL at 08:07

## 2021-07-09 RX ADMIN — OXYCODONE 20 MG: 5 TABLET ORAL at 16:41

## 2021-07-09 RX ADMIN — COLCHICINE 1.2 MG: 0.6 TABLET, FILM COATED ORAL at 09:45

## 2021-07-09 RX ADMIN — FUROSEMIDE 20 MG: 20 TABLET ORAL at 09:44

## 2021-07-09 RX ADMIN — ACETAMINOPHEN 650 MG: 325 TABLET ORAL at 14:26

## 2021-07-09 RX ADMIN — FAMOTIDINE 20 MG: 20 TABLET ORAL at 18:09

## 2021-07-09 RX ADMIN — ACETAMINOPHEN 650 MG: 325 TABLET ORAL at 22:17

## 2021-07-09 RX ADMIN — Medication 1 CAPSULE: at 09:44

## 2021-07-09 RX ADMIN — METHADONE HYDROCHLORIDE 140 MG: 10 CONCENTRATE ORAL at 06:12

## 2021-07-09 RX ADMIN — MAGNESIUM OXIDE 400 MG (241.3 MG MAGNESIUM) TABLET 400 MG: TABLET at 18:09

## 2021-07-09 RX ADMIN — OXYCODONE 20 MG: 5 TABLET ORAL at 12:11

## 2021-07-09 RX ADMIN — SODIUM CHLORIDE 10 ML: 9 INJECTION, SOLUTION INTRAMUSCULAR; INTRAVENOUS; SUBCUTANEOUS at 16:41

## 2021-07-09 RX ADMIN — LEVOTHYROXINE SODIUM 125 MCG: 0.12 TABLET ORAL at 06:13

## 2021-07-09 RX ADMIN — PREDNISONE 40 MG: 20 TABLET ORAL at 18:09

## 2021-07-09 RX ADMIN — LIDOCAINE: 40 CREAM TOPICAL at 18:10

## 2021-07-09 NOTE — PROGRESS NOTES
Palliative Medicine  [facility]: [phone])  [facility]: [phone])    Lupis Queen" Rolando Gonsalez is a 62 yo with a past history of CVA, gout, seizures, prior DVT, GERD, bladder cancer, chronic nonhealing wound on his left lower extremity x 2 years, Methadone treatment at [facility] chronic pain, who was admitted on 2021 from home with a diagnosis of severe sepsis, severe diffuse osteopenia (from Francois Hsieh NP notes). Pt lives in a handicap hotel ([facility]) with his friend. Pt is wheelchair bound and requires assistance for his ADL/IADLs. Pt's friend drives him to his appointments.  AMD on file, primary surrogate decision maker Friend, Denisha Lynn ([phone])    Palliative LCSW asked to see patient as he had expressed to a team member that he may want to amend his Medical Directive, to add a daughter to the document. LCSW in to meet with patient. He is vocal, friendly, loves to talk, has some mild memory issues (from CVA likely). Patient needs reminders about who in terms of staff came in to see him, who his  is, what the plan for discharge is etc. Patient notes that he tries to \"keep track\" of who he meets by asking people for their business card. He tries to remember people or does better with physical descriptions of team members. Patient is alert, oriented and is eager to leave the hospital. He was disappointed when he heard that he may be here through the weekend, as they are waiting for insurance auth for his rehab stay. 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      Patient asked about the Medical Directive, shared that Denisha Lynn is the only one on his AMD and whether he would want anyone else added to this. Patient shared that he has two daughters, one who lives in [place] [phone] and one is local who has a small baby. He shared that he does not want to change his AMD, that he does not want to \"burden\" his daughter who lives locally with any decisions, he only wants Dallin named as YOVANI. When LCSW shared that the previous day, when Mellisa Forman was called by CM and records indicated that she \"hung up\" the phone, patient stated that \"her phone was acting up and disconnecting calls\". Patient notes he trusts Dallin, that they were childhood friends and reconnected after many years. No further social work needs at this time.

## 2021-07-09 NOTE — PROGRESS NOTES
End of Shift Note    Bedside shift change report given to Nelson Reeves RN (oncoming nurse) by Marisol Galvez RN (offgoing nurse). Report included the following information SBAR and Kardex    Shift worked:  7a-7p     Shift summary and any significant changes:     Pt has been in a lot of pain. Pt allowed wound care was very painful. No other complaints. Plan is to be discharged to a snf. Concerns for physician to address:  none     Zone phone for oncoming shift:   [phone]       Activity:  Activity Level: Up with Assistance  Number times ambulated in hallways past shift: 0  Number of times OOB to chair past shift: 1    Cardiac:   Cardiac Monitoring: No      Cardiac Rhythm: Sinus Rhythm    Access:   Current line(s): PIV     Genitourinary:   Urinary status: voiding    Respiratory:   O2 Device: None (Room air)  Chronic home O2 use?: NO  Incentive spirometer at bedside: YES     GI:  Last Bowel Movement Date: 07/06/21  Current diet:  ADULT ORAL NUTRITION SUPPLEMENT Dinner, Breakfast; Wound Healing Supplement  ADULT DIET Regular; Semd 1-8oz bottle water every meal tray  ADULT ORAL NUTRITION SUPPLEMENT Breakfast, Dinner, Lunch; Low Calorie/High Protein  Passing flatus: YES  Tolerating current diet: YES       Pain Management:   Patient states pain is manageable on current regimen: YES    Skin:  Dejuan Score: 19  Interventions: speciality bed, float heels, increase time out of bed and limit briefs    Patient Safety:  Fall Score:  Total Score: 3  Interventions: assistive device (walker, cane, etc), gripper socks, pt to call before getting OOB and stay with me (per policy)  High Fall Risk: Yes    Length of Stay:  Expected LOS: 3d 12h  Actual LOS: EMERY Uribe

## 2021-07-09 NOTE — PROGRESS NOTES
Bedside and Verbal shift change report given to Hedy Beebe RN (oncoming nurse) by Lizzeth Sidhu (offgoing nurse). Report given with SBAR, Kardex, Intake/Output, MAR and Recent Results.

## 2021-07-09 NOTE — PROGRESS NOTES
Comprehensive Nutrition Assessment    Type and Reason for Visit: Reassess    Nutrition Recommendations/Plan:   · Continue regular diet. · Continue Ensure High Protein TID + Ten 1-pkt BID. · Please document % meals and supplements consumed in flowsheet I/O's under intake. Nutrition Assessment:     7/8: Chart reviewed; RD visited with pt at bedside. Pt anxious to be transferred to [facility]. Pt continues to tolerate a Regular diet + consuming Ensure HP with meals. Pt is also consuming the Ten supplements BID and asked where he can purchase post discharge. Pt will only consume if mixed with bottle water vs tap water. Pt frazzled at times discussing discharge plan. Patient Vitals for the past 168 hrs:   % Diet Eaten   07/06/21 1128 51 - 75%   07/06/21 0847 0%   07/05/21 0739 76 - 100%   07/04/21 1855 76 - 100%   07/04/21 1247 76 - 100%   07/04/21 0825 26 - 50%   07/02/21 1326 76 - 100%     Last Weight Metric  Weight Loss Metrics 7/3/2021 4/27/2021 12/11/2020 12/10/2020 10/21/2020 10/20/2020 9/9/2020   Today's Wt 250 lb 1.8 oz 254 lb 9.6 oz 237 lb 14 oz - - 242 lb 235 lb   BMI 33 kg/m2 33.59 kg/m2 - 31.53 kg/m2 31.93 kg/m2 - 31 kg/m2   Some encounter information is confidential and restricted. Go to Review Flowsheets activity to see all data. 7/6: Chart reviewed; med noted for sepsis, left necrotic ulcer and gangrene. RD visited pt at bedside this afternoon, pt c/o nausea at time of visit. Pt requested we increased Ensure High Protein from twice daily to TID in effort to meet protein needs. Pt inquiring about other nutritional supplements. Discussed the addition of Ten 1-pkt BID which has glutamine and arginine to assist with optimal wound healing. Explained to the patient must be consuming twice daily x 14 days for optimal effectiveness. RD provided a pack at the bedside and mixed with 8oz water for pt to try; pt likes the supplement and agreed to consume.  Pt requests to be given with bottle water as pt does not consume tap water. Estimated Daily Nutrient Needs:  Energy (kcal): 2376 (BMR 2020 x 1. 3AF) - 250 kcals; Weight Used for Energy Requirements: Current  Protein (g): 135 (1.2 g/kg bw); Weight Used for Protein Requirements: Current  Fluid (ml/day): 2300 ml/day; Method Used for Fluid Requirements: 1 ml/kcal    Nutrition Related Findings:  BM: 7/6; Labs: ; Meds: lovenox, prednisone, lasix      Current Nutrition Therapies:  ADULT ORAL NUTRITION SUPPLEMENT Dinner, Breakfast; Wound Healing Supplement  ADULT DIET Regular; Semd 1-8oz bottle water every meal tray  ADULT ORAL NUTRITION SUPPLEMENT Breakfast, Dinner, Lunch; Low Calorie/High Protein    Anthropometric Measures:  · Height:  6' 1\" (185.4 cm)  · Current Body Wt:  113.9 kg (251 lb 1.7 oz)   · Ideal Body Wt:  184 lbs:  136.5 %   · BMI Category:  Obese class 1 (BMI 30.0-34. 9)       Nutrition Diagnosis:   · Increased nutrient needs related to  (severe lower leg/foot wound with osteomyelitis) as evidenced by  (increased protein and energy needs to support wound healing)    Nutrition Interventions:   Food and/or Nutrient Delivery: Continue current diet, Start oral nutrition supplement  Nutrition Education and Counseling: No recommendations at this time  Coordination of Nutrition Care: Continue to monitor while inpatient    Goals:  PO intake >75% of meals + consume 240 ml ONS next 3-5 days       Nutrition Monitoring and Evaluation:   Behavioral-Environmental Outcomes: None identified  Food/Nutrient Intake Outcomes: Food and nutrient intake, Supplement intake  Physical Signs/Symptoms Outcomes: Biochemical data, Skin, Weight, GI status    Discharge Planning:     Too soon to determine     Electronically signed by Thu Gaines RD on 7/9/2021 at 11:42 AM

## 2021-07-09 NOTE — PROGRESS NOTES
Transition of Care Plan:    RUR: 29%  Disposition: Possible SNF    Follow up appointments: Follow up with PCP and specialist   DME needed: TBD by therapist   Transportation at Discharge: Possible Medicaid transport or family   [place] or means to access home:  Family have keys      IM Medicare letter: N/A  Caregiver Contact: Jessica Guallpa (friend) [phone]  Discharge Caregiver contacted prior to discharge? Family will be contacted at the time of d/c.    UPDATE: 11:34PM    CM received a call from [facility] coordinator, with concerns regarding pt's need for placement. Coordinator had concerns of pt needing methadone. CM will consult with physician and Complex CM to verify the following. CM will continue to follow. JUVENCIO Traore, [place]        UPDATE: 10:34AM    CM received call from admin coordinator at [facility], via telephone regarding CM voicemail regarding placement for pt. CM informed by admin coordinator to resend referral that was sent previously, so that they can review clinicals (referral sent). CM was informed that once clinicals have been reviewed then insurance auth can be initiated. INSURANCE AUTH CAN TAKE 24-48HRS TO COMPLETE-NOT INCLUDING WEEKENDS. CM informed that pt will require covid test (rapid) prior to [address]. CM will follow up with Complex CM of the following. AtkinsonJUVENCIO Cooper, [place]        INITIAL NOTE: CM made aware that pt did not d/c on 7/8/21. Pt reported social issues that were preventing him from d/c on 7/8/21. CM staffed case with Complex CM, and it was reported that pt can d/c to snf. Pt has been accepted to [facility] and [address] [place] [address] (previous referrals sent). CM attempted contact with both facilities to verify if pt is still a potential candidate for their facility, and if insurance auth is needed prior to admission. CM currently waiting for return call from both facilities.     CM was able to cancel pt's appointment for [facility] dermatology, and reschedule for an later date (appointment placed on AVS). CM will verify if pt will require covid test at time of d/c. Physician aware of the following. CM will enter potential d/c date and delay. CM and Complex CM will continue to follow.     JUVENCIO Linton, [facility]

## 2021-07-09 NOTE — PROGRESS NOTES
Hospitalist Progress Note    NAME: Francesca Mazariegos   :  1965   MRN:  [id number]       Assessment / Plan:  Severe sepsis POA resolved  Left leg necrotic ulcer due to prior trauma and pyoderma gangrenosum  Intractable leg pain    Has been following with Dr Kelly Adams (derm) since 2020 and has tried multiple courses of oral steroids. May need cyclosporin or humira. Had wound debridement at [facility] in  which may have worsened the wounds in retrospect.       -Patient needs formal evaluation in an academic center. His current social situation has remained a barrier for consistent follow up, wound care or subspecialty consultation. Patient has agreed to go to a SNF or NH if needed.       Meropenem and Zyvox course complete today  Continue wound care  Appreciate palliative team help for pain management.     I called [facility] transfer center on , spoke with Triage MD, still not accepting non cardiac/stroke transfer. Was also tried twice by prior hospitalist on  and   CM was able to set up [facility] dermatology appointment for today, and was planned for d/c with home health yesterday, however he mentioned he cannot go d/t his home situation/noone currently to take care of him. Now plan is to send him to SNF.     LUE swelling, improved with diuresis  Gout  -LUE swelling has improved. Switched to po steroid, 2 more doses. Doesn't look like gout. C/w colchicine     GERD  -pepcid bid     HTN  -continue lisinopril     Hypothyroid  -continue levothyroxine     Hx bladder cancer  Hx stroke from aneurysm with left sided weakness  -continue aspirin     Chronic methadone use: Continue home dose methadone      Obesity: BMI 31. Counseling about lifestyle modification provided        Body mass index is 31.66 kg/m².   Estimated discharge date: Waiting SNF placment now  Barriers: Complex care case     Code status: Full  Prophylaxis: Lovenox  Recommended Disposition: TBD     Subjective:     Chief Complaint / Reason for Physician Visit  Follow up for Left leg necrotic ulcer  He mentions he had a rough night. Review of Systems:  Symptom Y/N Comments  Symptom Y/N Comments   Fever/Chills n   Chest Pain n    Poor Appetite n   Edema n    Cough    Abdominal Pain     Sputum    Joint Pain     SOB/BCEK    Pruritis/Rash     Nausea/vomit    Tolerating PT/OT     Diarrhea    Tolerating Diet     Constipation    Other       Could NOT obtain due to:      Objective:     VITALS:   Last 24hrs VS reviewed since prior progress note. Most recent are:  Patient Vitals for the past 24 hrs:   Temp Pulse Resp BP SpO2   07/09/21 1102 98.2 °F (36.8 °C) (!) 110 18 (!) 145/98 98 %   07/09/21 0807 97.7 °F (36.5 °C) 87 18 (!) 160/104 99 %   07/08/21 1951 98.7 °F (37.1 °C) 77 18 (!) 156/85 95 %       Intake/Output Summary (Last 24 hours) at 7/9/2021 1539  Last data filed at 7/9/2021 1322  Gross per 24 hour   Intake 610 ml   Output 2600 ml   Net -1990 ml        I had a face to face encounter and independently examined this patient on 7/9/2021, as outlined below:  PHYSICAL EXAM:  General: WD, WN. Alert, cooperative, no acute distress    EENT:  EOMI. Anicteric sclerae. MMM  Resp:  CTA bilaterally, no wheezing or rales. No accessory muscle use  CV:  Regular  rhythm,  No edema  GI:  Soft, Non distended, Non tender. +Bowel sounds  Neurologic:  Alert and oriented X 3, normal speech, Left upper and lower ext weakness  Psych:   Good insight. Not anxious nor agitated  Skin:  Left lower ext bulky dressing.     Reviewed most current lab test results and cultures  YES  Reviewed most current radiology test results   YES  Review and summation of old records today    NO  Reviewed patient's current orders and MAR    YES  PMH/SH reviewed - no change compared to H&P  ________________________________________________________________________  Care Plan discussed with:    Comments   Patient y    Family      RN y    Care Manager     Consultant                        Multidiciplinary team rounds were held today with , nursing, pharmacist and clinical coordinator. Patient's plan of care was discussed; medications were reviewed and discharge planning was addressed. ________________________________________________________________________  Total NON critical care TIME:  35  Minutes    Total CRITICAL CARE TIME Spent:   Minutes non procedure based      Comments   >50% of visit spent in counseling and coordination of care     ________________________________________________________________________  Myrna Bales MD     Procedures: see electronic medical records for all procedures/Xrays and details which were not copied into this note but were reviewed prior to creation of Plan. LABS:  I reviewed today's most current labs and imaging studies. Pertinent labs include:  No results for input(s): WBC, HGB, HCT, PLT, HGBEXT, HCTEXT, PLTEXT, HGBEXT, HCTEXT, PLTEXT in the last 72 hours. No results for input(s): NA, K, CL, CO2, GLU, BUN, CREA, CA, MG, PHOS, ALB, TBIL, TBILI, ALT, INR, INREXT, INREXT in the last 72 hours.     No lab exists for component: SGOT    Signed: Myrna Bales MD

## 2021-07-10 PROCEDURE — 74011250637 HC RX REV CODE- 250/637: Performed by: NURSE PRACTITIONER

## 2021-07-10 PROCEDURE — 74011250636 HC RX REV CODE- 250/636: Performed by: HOSPITALIST

## 2021-07-10 PROCEDURE — 74011636637 HC RX REV CODE- 636/637: Performed by: STUDENT IN AN ORGANIZED HEALTH CARE EDUCATION/TRAINING PROGRAM

## 2021-07-10 PROCEDURE — 77030041076 HC DRSG AG OPTICELL MDII -A

## 2021-07-10 PROCEDURE — 2709999900 HC NON-CHARGEABLE SUPPLY

## 2021-07-10 PROCEDURE — 65270000029 HC RM PRIVATE

## 2021-07-10 PROCEDURE — 94760 N-INVAS EAR/PLS OXIMETRY 1: CPT

## 2021-07-10 PROCEDURE — 74011000250 HC RX REV CODE- 250: Performed by: STUDENT IN AN ORGANIZED HEALTH CARE EDUCATION/TRAINING PROGRAM

## 2021-07-10 PROCEDURE — 74011250637 HC RX REV CODE- 250/637: Performed by: HOSPITALIST

## 2021-07-10 PROCEDURE — 74011250637 HC RX REV CODE- 250/637: Performed by: STUDENT IN AN ORGANIZED HEALTH CARE EDUCATION/TRAINING PROGRAM

## 2021-07-10 PROCEDURE — 77030041070 HC DRSG ALG MDII -A

## 2021-07-10 PROCEDURE — 74011250637 HC RX REV CODE- 250/637: Performed by: INTERNAL MEDICINE

## 2021-07-10 RX ORDER — IBUPROFEN 600 MG/1
600 TABLET ORAL ONCE
Status: COMPLETED | OUTPATIENT
Start: 2021-07-10 | End: 2021-07-10

## 2021-07-10 RX ADMIN — TAMSULOSIN HYDROCHLORIDE 0.4 MG: 0.4 CAPSULE ORAL at 09:49

## 2021-07-10 RX ADMIN — Medication 3 MG: at 04:22

## 2021-07-10 RX ADMIN — ACETAMINOPHEN 650 MG: 325 TABLET ORAL at 09:58

## 2021-07-10 RX ADMIN — METHADONE HYDROCHLORIDE 140 MG: 10 CONCENTRATE ORAL at 06:46

## 2021-07-10 RX ADMIN — OXYCODONE 20 MG: 5 TABLET ORAL at 06:46

## 2021-07-10 RX ADMIN — SODIUM CHLORIDE 10 ML: 9 INJECTION, SOLUTION INTRAMUSCULAR; INTRAVENOUS; SUBCUTANEOUS at 21:33

## 2021-07-10 RX ADMIN — MAGNESIUM OXIDE 400 MG (241.3 MG MAGNESIUM) TABLET 400 MG: TABLET at 09:51

## 2021-07-10 RX ADMIN — SODIUM CHLORIDE 10 ML: 9 INJECTION, SOLUTION INTRAMUSCULAR; INTRAVENOUS; SUBCUTANEOUS at 09:53

## 2021-07-10 RX ADMIN — ONDANSETRON 4 MG: 2 INJECTION INTRAMUSCULAR; INTRAVENOUS at 11:00

## 2021-07-10 RX ADMIN — ACETAMINOPHEN 650 MG: 325 TABLET ORAL at 03:16

## 2021-07-10 RX ADMIN — FAMOTIDINE 20 MG: 20 TABLET ORAL at 09:51

## 2021-07-10 RX ADMIN — OXYCODONE 20 MG: 5 TABLET ORAL at 17:46

## 2021-07-10 RX ADMIN — COLCHICINE 1.2 MG: 0.6 TABLET, FILM COATED ORAL at 09:51

## 2021-07-10 RX ADMIN — LEVOTHYROXINE SODIUM 125 MCG: 0.12 TABLET ORAL at 06:46

## 2021-07-10 RX ADMIN — GABAPENTIN 300 MG: 300 CAPSULE ORAL at 17:46

## 2021-07-10 RX ADMIN — FUROSEMIDE 20 MG: 20 TABLET ORAL at 09:49

## 2021-07-10 RX ADMIN — FAMOTIDINE 20 MG: 20 TABLET ORAL at 17:50

## 2021-07-10 RX ADMIN — OXYCODONE 20 MG: 5 TABLET ORAL at 00:58

## 2021-07-10 RX ADMIN — Medication 3 MG: at 21:31

## 2021-07-10 RX ADMIN — TRIAMCINOLONE ACETONIDE: 1 OINTMENT TOPICAL at 09:48

## 2021-07-10 RX ADMIN — MAGNESIUM OXIDE 400 MG (241.3 MG MAGNESIUM) TABLET 400 MG: TABLET at 17:45

## 2021-07-10 RX ADMIN — ASPIRIN 81 MG: 81 TABLET, CHEWABLE ORAL at 09:49

## 2021-07-10 RX ADMIN — LIDOCAINE: 40 CREAM TOPICAL at 17:46

## 2021-07-10 RX ADMIN — GABAPENTIN 300 MG: 300 CAPSULE ORAL at 21:31

## 2021-07-10 RX ADMIN — OXYCODONE 20 MG: 5 TABLET ORAL at 21:32

## 2021-07-10 RX ADMIN — TRIAMCINOLONE ACETONIDE: 1 OINTMENT TOPICAL at 17:46

## 2021-07-10 RX ADMIN — LISINOPRIL 10 MG: 10 TABLET ORAL at 09:49

## 2021-07-10 RX ADMIN — PREDNISONE 40 MG: 20 TABLET ORAL at 17:46

## 2021-07-10 RX ADMIN — GABAPENTIN 300 MG: 300 CAPSULE ORAL at 09:49

## 2021-07-10 RX ADMIN — IBUPROFEN 600 MG: 600 TABLET, FILM COATED ORAL at 00:32

## 2021-07-10 RX ADMIN — Medication 1 CAPSULE: at 09:49

## 2021-07-10 RX ADMIN — SODIUM CHLORIDE 10 ML: 9 INJECTION, SOLUTION INTRAMUSCULAR; INTRAVENOUS; SUBCUTANEOUS at 14:00

## 2021-07-10 RX ADMIN — OXYCODONE 20 MG: 5 TABLET ORAL at 11:00

## 2021-07-10 NOTE — PROGRESS NOTES
End of Shift Note    Bedside shift change report given to Myra Serrato RN (oncoming nurse) by Ke Mathew RN (offgoing nurse). Report included the following information SBAR and Kardex    Shift worked:  7a-7p     Shift summary and any significant changes:     Pt refused wound care because the pain meds weren't working yet. Pt did stump his toe trying to get back and bed so there is some bleeding on dressing. Pt was nauseous today so he got one dose of zofran. Pt accidentally hit the code blue button today when he was getting back in bed it was a false alarm. Pt says the pain medicine is not enough     Concerns for physician to address:  none     Zone phone for oncoming shift:   [phone]       Activity:  Activity Level: Up ad dionte  Number times ambulated in hallways past shift: 0  Number of times OOB to chair past shift: 1    Cardiac:   Cardiac Monitoring: No      Cardiac Rhythm: Sinus Rhythm    Access:   Current line(s): PIV     Genitourinary:   Urinary status: voiding    Respiratory:   O2 Device: None (Room air)  Chronic home O2 use?: NO  Incentive spirometer at bedside: NO     GI:  Last Bowel Movement Date: 07/06/21  Current diet:  ADULT ORAL NUTRITION SUPPLEMENT Dinner, Breakfast; Wound Healing Supplement  ADULT DIET Regular; Semd 1-8oz bottle water every meal tray  ADULT ORAL NUTRITION SUPPLEMENT Breakfast, Dinner, Lunch; Low Calorie/High Protein  Passing flatus: YES  Tolerating current diet: YES       Pain Management:   Patient states pain is manageable on current regimen: NO    Skin:  Dejuan Score: 20  Interventions: speciality bed, increase time out of bed and limit briefs    Patient Safety:  Fall Score:  Total Score: 3  Interventions: assistive device (walker, cane, etc), gripper socks, pt to call before getting OOB and stay with me (per policy)  High Fall Risk: Yes    Length of Stay:  Expected LOS: 3d 12h  Actual LOS: [address] Nasime Sudhir, RN

## 2021-07-10 NOTE — PROGRESS NOTES
End of Shift Note    Bedside shift change report given to Imtiaz Baca RN (oncoming nurse) by Annette Pompa RN (offgoing nurse). Report included the following information SBAR, Kardex and Recent Results    Shift worked:  7p-7a     Shift summary and any significant changes:     Pt c/o of pain all during the shift. He requested I contact NP for one time dose of something to help his pain. NP Susi notified, see orders. Concerns for physician to address:  none     Zone phone for oncoming shift:   [phone]       Activity:  Activity Level: Up ad dionte, with walker  Number times ambulated in hallways past shift: 0  Number of times OOB to chair past shift: 0    Cardiac:   Cardiac Monitoring: No      Cardiac Rhythm: Sinus Rhythm    Access:   Current line(s): PIV     Genitourinary:   Urinary status: voiding    Respiratory:   O2 Device: None (Room air)  Chronic home O2 use?: NO  Incentive spirometer at bedside: NO     GI:  Last Bowel Movement Date: 07/06/21  Current diet:  ADULT ORAL NUTRITION SUPPLEMENT Dinner, Breakfast; Wound Healing Supplement  ADULT DIET Regular; Semd 1-8oz bottle water every meal tray  ADULT ORAL NUTRITION SUPPLEMENT Breakfast, Dinner, Lunch; Low Calorie/High Protein  Passing flatus: YES  Tolerating current diet: YES       Pain Management:   Patient states pain is manageable on current regimen: NO    Skin:  Dejuan Score: 20  Interventions: increase time out of bed, limit briefs and nutritional support     Patient Safety:  Fall Score:  Total Score: 3  Interventions: assistive device (walker, cane, etc), gripper socks, pt to call before getting OOB and stay with me (per policy)  High Fall Risk: Yes    Length of Stay:  Expected LOS: 3d 12h  Actual LOS: Matthew Turner 1045, RN

## 2021-07-10 NOTE — PROGRESS NOTES
Received notification from bedside RN about patient with regards to: requesting pain medication for breakthrough prior to next scheduled PRN pain med  VS: /73, HR 96, RR 18, O2 sat 92% on RA    Intervention given: Motrin PO x 1 dose ordered

## 2021-07-10 NOTE — PROGRESS NOTES
Hospitalist Progress Note    NAME: Heidi Mann   :  1965   MRN:  [id number]       Assessment / Plan:  Severe sepsis POA resolved  Left leg necrotic ulcer due to prior trauma and pyoderma gangrenosum  Intractable leg pain    Has been following with Dr Rachael Wong (derm) since 2020 and has tried multiple courses of oral steroids. May need cyclosporin or humira. Had wound debridement at [facility] in  which may have worsened the wounds in retrospect.       -Patient needs formal evaluation in an academic center. His current social situation has remained a barrier for consistent follow up, wound care or subspecialty consultation. Ab course completed  Continue wound care  Appreciate palliative team help for pain management.     I called [facility] transfer center on , spoke with Triage MD, still not accepting non cardiac/stroke transfer. Was also tried twice by prior hospitalist on  and   CM was able to set up [facility] dermatology appointment for today, and was planned for d/c with home health yesterday, however he mentioned he cannot go d/t his home situation/noone currently to take care of him. Now plan is to send him to SNF.     LUE swelling, improved with diuresis  Gout  -LUE swelling has improved. Steroid course completed. Doesn't look like gout. C/w colchicine     GERD  -pepcid bid     HTN  -continue lisinopril     Hypothyroid  -continue levothyroxine     Hx bladder cancer  Hx stroke from aneurysm with left sided weakness  -continue aspirin     Chronic methadone use: Continue home dose methadone      Obesity: BMI 31. Counseling about lifestyle modification provided        Body mass index is 31.66 kg/m².   Estimated discharge date: Waiting SNF placment now  Barriers: Complex care case     Code status: Full  Prophylaxis: Lovenox  Recommended Disposition: TBD     Subjective:     Chief Complaint / Reason for Physician Visit  Follow up for Left leg necrotic ulcer  He continues to complain of leg pain    Review of Systems:  Symptom Y/N Comments  Symptom Y/N Comments   Fever/Chills n   Chest Pain n    Poor Appetite n   Edema n    Cough    Abdominal Pain     Sputum    Joint Pain     SOB/BECK    Pruritis/Rash     Nausea/vomit    Tolerating PT/OT     Diarrhea    Tolerating Diet     Constipation    Other       Could NOT obtain due to:      Objective:     VITALS:   Last 24hrs VS reviewed since prior progress note. Most recent are:  Patient Vitals for the past 24 hrs:   Temp Pulse Resp BP SpO2   07/10/21 0933 -- 89 18 (!) 142/99 99 %   07/10/21 0821 98.1 °F (36.7 °C) 94 18 (!) 149/102 96 %   07/10/21 0056 97.9 °F (36.6 °C) 69 17 119/79 95 %   07/09/21 1913 97.8 °F (36.6 °C) 96 18 127/73 92 %   07/09/21 1702 97.8 °F (36.6 °C) (!) 109 18 128/82 94 %       Intake/Output Summary (Last 24 hours) at 7/10/2021 1106  Last data filed at 7/9/2021 1322  Gross per 24 hour   Intake 150 ml   Output --   Net 150 ml        I had a face to face encounter and independently examined this patient on 7/10/2021, as outlined below:  PHYSICAL EXAM:  General: WD, WN. Alert, cooperative, no acute distress    EENT:  EOMI. Anicteric sclerae. MMM  Resp:  CTA bilaterally, no wheezing or rales. No accessory muscle use  CV:  Regular  rhythm,  No edema  GI:  Soft, Non distended, Non tender. +Bowel sounds  Neurologic:  Alert and oriented X 3, normal speech, Left upper and lower ext weakness  Psych:   Good insight. Not anxious nor agitated  Skin:  Left lower ext bulky dressing.     Reviewed most current lab test results and cultures  YES  Reviewed most current radiology test results   YES  Review and summation of old records today    NO  Reviewed patient's current orders and MAR    YES  PMH/SH reviewed - no change compared to H&P  ________________________________________________________________________  Care Plan discussed with:    Comments   Patient y    Family      RN y    Care Manager     Consultant                        Multidiciplinary team rounds were held today with , nursing, pharmacist and clinical coordinator. Patient's plan of care was discussed; medications were reviewed and discharge planning was addressed. ________________________________________________________________________  Total NON critical care TIME:  35  Minutes    Total CRITICAL CARE TIME Spent:   Minutes non procedure based      Comments   >50% of visit spent in counseling and coordination of care     ________________________________________________________________________  Aman Mario MD     Procedures: see electronic medical records for all procedures/Xrays and details which were not copied into this note but were reviewed prior to creation of Plan. LABS:  I reviewed today's most current labs and imaging studies. Pertinent labs include:  No results for input(s): WBC, HGB, HCT, PLT, HGBEXT, HCTEXT, PLTEXT, HGBEXT, HCTEXT, PLTEXT in the last 72 hours. No results for input(s): NA, K, CL, CO2, GLU, BUN, CREA, CA, MG, PHOS, ALB, TBIL, TBILI, ALT, INR, INREXT, INREXT in the last 72 hours.     No lab exists for component: SGOT    Signed: Amna Mario MD

## 2021-07-11 PROCEDURE — 74011250637 HC RX REV CODE- 250/637: Performed by: INTERNAL MEDICINE

## 2021-07-11 PROCEDURE — 74011250637 HC RX REV CODE- 250/637: Performed by: HOSPITALIST

## 2021-07-11 PROCEDURE — 74011250636 HC RX REV CODE- 250/636: Performed by: HOSPITALIST

## 2021-07-11 PROCEDURE — 94760 N-INVAS EAR/PLS OXIMETRY 1: CPT

## 2021-07-11 PROCEDURE — 65270000029 HC RM PRIVATE

## 2021-07-11 PROCEDURE — 2709999900 HC NON-CHARGEABLE SUPPLY

## 2021-07-11 PROCEDURE — 74011250637 HC RX REV CODE- 250/637: Performed by: STUDENT IN AN ORGANIZED HEALTH CARE EDUCATION/TRAINING PROGRAM

## 2021-07-11 PROCEDURE — 74011250637 HC RX REV CODE- 250/637: Performed by: NURSE PRACTITIONER

## 2021-07-11 RX ADMIN — COLCHICINE 0.6 MG: 0.6 TABLET, FILM COATED ORAL at 21:49

## 2021-07-11 RX ADMIN — LEVOTHYROXINE SODIUM 125 MCG: 0.12 TABLET ORAL at 06:10

## 2021-07-11 RX ADMIN — TRIAMCINOLONE ACETONIDE: 1 OINTMENT TOPICAL at 18:00

## 2021-07-11 RX ADMIN — GABAPENTIN 300 MG: 300 CAPSULE ORAL at 15:51

## 2021-07-11 RX ADMIN — SODIUM CHLORIDE 10 ML: 9 INJECTION, SOLUTION INTRAMUSCULAR; INTRAVENOUS; SUBCUTANEOUS at 06:12

## 2021-07-11 RX ADMIN — MAGNESIUM OXIDE 400 MG (241.3 MG MAGNESIUM) TABLET 400 MG: TABLET at 09:41

## 2021-07-11 RX ADMIN — Medication 3 MG: at 21:49

## 2021-07-11 RX ADMIN — SODIUM CHLORIDE 10 ML: 9 INJECTION, SOLUTION INTRAMUSCULAR; INTRAVENOUS; SUBCUTANEOUS at 14:00

## 2021-07-11 RX ADMIN — METHADONE HYDROCHLORIDE 140 MG: 10 CONCENTRATE ORAL at 06:06

## 2021-07-11 RX ADMIN — OXYCODONE 20 MG: 5 TABLET ORAL at 09:41

## 2021-07-11 RX ADMIN — TAMSULOSIN HYDROCHLORIDE 0.4 MG: 0.4 CAPSULE ORAL at 09:41

## 2021-07-11 RX ADMIN — OXYCODONE 20 MG: 5 TABLET ORAL at 01:34

## 2021-07-11 RX ADMIN — COLCHICINE 1.2 MG: 0.6 TABLET, FILM COATED ORAL at 09:47

## 2021-07-11 RX ADMIN — SODIUM CHLORIDE 5 ML: 9 INJECTION, SOLUTION INTRAMUSCULAR; INTRAVENOUS; SUBCUTANEOUS at 21:03

## 2021-07-11 RX ADMIN — OXYCODONE 20 MG: 5 TABLET ORAL at 17:40

## 2021-07-11 RX ADMIN — FAMOTIDINE 20 MG: 20 TABLET ORAL at 09:47

## 2021-07-11 RX ADMIN — Medication 1 CAPSULE: at 09:41

## 2021-07-11 RX ADMIN — ONDANSETRON 4 MG: 2 INJECTION INTRAMUSCULAR; INTRAVENOUS at 09:47

## 2021-07-11 RX ADMIN — OXYCODONE 20 MG: 5 TABLET ORAL at 06:11

## 2021-07-11 RX ADMIN — GABAPENTIN 300 MG: 300 CAPSULE ORAL at 21:03

## 2021-07-11 RX ADMIN — OXYCODONE 20 MG: 5 TABLET ORAL at 13:49

## 2021-07-11 RX ADMIN — FUROSEMIDE 20 MG: 20 TABLET ORAL at 09:40

## 2021-07-11 RX ADMIN — LISINOPRIL 10 MG: 10 TABLET ORAL at 09:42

## 2021-07-11 RX ADMIN — MAGNESIUM OXIDE 400 MG (241.3 MG MAGNESIUM) TABLET 400 MG: TABLET at 17:41

## 2021-07-11 RX ADMIN — ASPIRIN 81 MG: 81 TABLET, CHEWABLE ORAL at 09:40

## 2021-07-11 RX ADMIN — OXYCODONE 20 MG: 5 TABLET ORAL at 21:49

## 2021-07-11 RX ADMIN — GABAPENTIN 300 MG: 300 CAPSULE ORAL at 09:40

## 2021-07-11 RX ADMIN — FAMOTIDINE 20 MG: 20 TABLET ORAL at 17:41

## 2021-07-11 RX ADMIN — ACETAMINOPHEN 650 MG: 325 TABLET ORAL at 15:53

## 2021-07-11 NOTE — PROGRESS NOTES
Hospitalist Progress Note    NAME: Dallas Joseph   :  1965   MRN:  [id number]       Assessment / Plan:  Severe sepsis POA resolved  Left leg necrotic ulcer due to prior trauma and pyoderma gangrenosum  Intractable leg pain    Has been following with Dr Luis Antonio Lara (derm) since 2020 and has tried multiple courses of oral steroids. May need cyclosporin or humira. Had wound debridement at [facility] in  which may have worsened the wounds in retrospect.       -Patient needs formal evaluation in an academic center. His current social situation has remained a barrier for consistent follow up, wound care or subspecialty consultation. Ab course completed  Continue wound care  Appreciate palliative team help for pain management.     I called [facility] transfer center on , spoke with Triage MD, still not accepting non cardiac/stroke transfer. Was also tried twice by prior hospitalist on  and . CM was able to set up [facility] dermatology appointment, and was planned for d/c with home health last week, however he mentioned he cannot go d/t his home situation/noone currently to take care of him. Now plan is to send him to SNF, with outpatient dermatology appointment, which is setup already     LUE swelling, improved with diuresis  Gout  -LUE swelling has improved. Steroid course completed. Doesn't look like gout. C/w colchicine     GERD  -pepcid bid     HTN  -continue lisinopril     Hypothyroid  -continue levothyroxine     Hx bladder cancer  Hx stroke from aneurysm with left sided weakness  -continue aspirin     Chronic methadone use: Continue home dose methadone      Obesity: BMI 31. Counseling about lifestyle modification provided        Body mass index is 31.66 kg/m².   Estimated discharge date: Waiting SNF placment now  Barriers: Complex care case     Code status: Full  Prophylaxis: Lovenox  Recommended Disposition: TBD     Subjective:     Chief Complaint / Reason for Physician Visit  Follow up for Left leg necrotic ulcer  He continues to complain of leg pain    Review of Systems:  Symptom Y/N Comments  Symptom Y/N Comments   Fever/Chills n   Chest Pain n    Poor Appetite n   Edema n    Cough    Abdominal Pain     Sputum    Joint Pain     SOB/BECK    Pruritis/Rash     Nausea/vomit    Tolerating PT/OT     Diarrhea    Tolerating Diet     Constipation    Other       Could NOT obtain due to:      Objective:     VITALS:   Last 24hrs VS reviewed since prior progress note. Most recent are:  Patient Vitals for the past 24 hrs:   Temp Pulse Resp BP SpO2   07/11/21 1150 98.2 °F (36.8 °C) 98 16 121/87 96 %   07/11/21 0754 98.4 °F (36.9 °C) 82 18 (!) 133/95 97 %   07/11/21 0302 98.6 °F (37 °C) 94 18 107/70 95 %   07/10/21 2235 98.7 °F (37.1 °C) 85 18 117/77 94 %   07/10/21 2013 98.4 °F (36.9 °C) 92 18 107/77 95 %   07/10/21 1518 98.1 °F (36.7 °C) 81 18 120/65 96 %   07/10/21 1251 98.1 °F (36.7 °C) 75 17 (!) 124/94 96 %       Intake/Output Summary (Last 24 hours) at 7/11/2021 1204  Last data filed at 7/11/2021 0059  Gross per 24 hour   Intake --   Output 2950 ml   Net -2950 ml        I had a face to face encounter and independently examined this patient on 7/11/2021, as outlined below:  PHYSICAL EXAM:  General: WD, WN. Alert, cooperative, no acute distress    EENT:  EOMI. Anicteric sclerae. MMM  Resp:  CTA bilaterally, no wheezing or rales. No accessory muscle use  CV:  Regular  rhythm,  No edema  GI:  Soft, Non distended, Non tender. +Bowel sounds  Neurologic:  Alert and oriented X 3, normal speech, Left upper and lower ext weakness  Psych:   Good insight. Not anxious nor agitated  Skin:  Left lower ext bulky dressing.     Reviewed most current lab test results and cultures  YES  Reviewed most current radiology test results   YES  Review and summation of old records today    NO  Reviewed patient's current orders and MAR    YES  PMH/SH reviewed - no change compared to H&P  ________________________________________________________________________  Care Plan discussed with:    Comments   Patient y    Family      RN y    Care Manager     Consultant                        Multidiciplinary team rounds were held today with , nursing, pharmacist and clinical coordinator. Patient's plan of care was discussed; medications were reviewed and discharge planning was addressed. ________________________________________________________________________  Total NON critical care TIME:  35  Minutes    Total CRITICAL CARE TIME Spent:   Minutes non procedure based      Comments   >50% of visit spent in counseling and coordination of care     ________________________________________________________________________  Aman Mario MD     Procedures: see electronic medical records for all procedures/Xrays and details which were not copied into this note but were reviewed prior to creation of Plan. LABS:  I reviewed today's most current labs and imaging studies. Pertinent labs include:  No results for input(s): WBC, HGB, HCT, PLT, HGBEXT, HCTEXT, PLTEXT, HGBEXT, HCTEXT, PLTEXT in the last 72 hours. No results for input(s): NA, K, CL, CO2, GLU, BUN, CREA, CA, MG, PHOS, ALB, TBIL, TBILI, ALT, INR, INREXT, INREXT in the last 72 hours.     No lab exists for component: SGOT    Signed: Aman Mario MD

## 2021-07-12 PROCEDURE — 65270000029 HC RM PRIVATE

## 2021-07-12 PROCEDURE — 74011250637 HC RX REV CODE- 250/637: Performed by: INTERNAL MEDICINE

## 2021-07-12 PROCEDURE — 74011250637 HC RX REV CODE- 250/637: Performed by: NURSE PRACTITIONER

## 2021-07-12 PROCEDURE — 74011250637 HC RX REV CODE- 250/637: Performed by: STUDENT IN AN ORGANIZED HEALTH CARE EDUCATION/TRAINING PROGRAM

## 2021-07-12 PROCEDURE — 74011250637 HC RX REV CODE- 250/637: Performed by: HOSPITALIST

## 2021-07-12 PROCEDURE — 74011250636 HC RX REV CODE- 250/636: Performed by: HOSPITALIST

## 2021-07-12 PROCEDURE — 94760 N-INVAS EAR/PLS OXIMETRY 1: CPT

## 2021-07-12 PROCEDURE — 74011250636 HC RX REV CODE- 250/636: Performed by: STUDENT IN AN ORGANIZED HEALTH CARE EDUCATION/TRAINING PROGRAM

## 2021-07-12 RX ORDER — MORPHINE SULFATE 2 MG/ML
2 INJECTION, SOLUTION INTRAMUSCULAR; INTRAVENOUS ONCE
Status: COMPLETED | OUTPATIENT
Start: 2021-07-12 | End: 2021-07-12

## 2021-07-12 RX ADMIN — ACETAMINOPHEN 650 MG: 325 TABLET ORAL at 21:17

## 2021-07-12 RX ADMIN — ONDANSETRON 4 MG: 2 INJECTION INTRAMUSCULAR; INTRAVENOUS at 00:25

## 2021-07-12 RX ADMIN — Medication 1 CAPSULE: at 10:21

## 2021-07-12 RX ADMIN — OXYCODONE 20 MG: 5 TABLET ORAL at 21:15

## 2021-07-12 RX ADMIN — OXYCODONE 20 MG: 5 TABLET ORAL at 15:33

## 2021-07-12 RX ADMIN — ACETAMINOPHEN 650 MG: 325 TABLET ORAL at 03:49

## 2021-07-12 RX ADMIN — TRIAMCINOLONE ACETONIDE: 1 OINTMENT TOPICAL at 19:25

## 2021-07-12 RX ADMIN — OXYCODONE 20 MG: 5 TABLET ORAL at 10:21

## 2021-07-12 RX ADMIN — GABAPENTIN 300 MG: 300 CAPSULE ORAL at 21:15

## 2021-07-12 RX ADMIN — MAGNESIUM OXIDE 400 MG (241.3 MG MAGNESIUM) TABLET 400 MG: TABLET at 10:24

## 2021-07-12 RX ADMIN — LEVOTHYROXINE SODIUM 125 MCG: 0.12 TABLET ORAL at 06:14

## 2021-07-12 RX ADMIN — MORPHINE SULFATE 2 MG: 2 INJECTION, SOLUTION INTRAMUSCULAR; INTRAVENOUS at 18:48

## 2021-07-12 RX ADMIN — TAMSULOSIN HYDROCHLORIDE 0.4 MG: 0.4 CAPSULE ORAL at 10:24

## 2021-07-12 RX ADMIN — GABAPENTIN 300 MG: 300 CAPSULE ORAL at 10:21

## 2021-07-12 RX ADMIN — COLCHICINE 1.2 MG: 0.6 TABLET, FILM COATED ORAL at 10:23

## 2021-07-12 RX ADMIN — FUROSEMIDE 20 MG: 20 TABLET ORAL at 10:22

## 2021-07-12 RX ADMIN — SODIUM CHLORIDE 10 ML: 9 INJECTION, SOLUTION INTRAMUSCULAR; INTRAVENOUS; SUBCUTANEOUS at 15:34

## 2021-07-12 RX ADMIN — FAMOTIDINE 20 MG: 20 TABLET ORAL at 10:21

## 2021-07-12 RX ADMIN — GABAPENTIN 300 MG: 300 CAPSULE ORAL at 15:33

## 2021-07-12 RX ADMIN — LISINOPRIL 10 MG: 10 TABLET ORAL at 10:21

## 2021-07-12 RX ADMIN — SODIUM CHLORIDE 5 ML: 9 INJECTION, SOLUTION INTRAMUSCULAR; INTRAVENOUS; SUBCUTANEOUS at 21:18

## 2021-07-12 RX ADMIN — OXYCODONE 20 MG: 5 TABLET ORAL at 01:53

## 2021-07-12 RX ADMIN — Medication 3 MG: at 21:15

## 2021-07-12 RX ADMIN — FAMOTIDINE 20 MG: 20 TABLET ORAL at 18:48

## 2021-07-12 RX ADMIN — SODIUM CHLORIDE 5 ML: 9 INJECTION, SOLUTION INTRAMUSCULAR; INTRAVENOUS; SUBCUTANEOUS at 06:41

## 2021-07-12 RX ADMIN — ACETAMINOPHEN 650 MG: 325 TABLET ORAL at 12:41

## 2021-07-12 RX ADMIN — MAGNESIUM OXIDE 400 MG (241.3 MG MAGNESIUM) TABLET 400 MG: TABLET at 18:48

## 2021-07-12 RX ADMIN — ASPIRIN 81 MG: 81 TABLET, CHEWABLE ORAL at 10:21

## 2021-07-12 RX ADMIN — METHADONE HYDROCHLORIDE 140 MG: 10 CONCENTRATE ORAL at 06:41

## 2021-07-12 RX ADMIN — OXYCODONE 20 MG: 5 TABLET ORAL at 06:13

## 2021-07-12 NOTE — PROGRESS NOTES
Transition of Care Plan:    RUR: 29%  Disposition: Possible SNF    Follow up appointments: Follow up with PCP and specialist   DME needed: TBD by therapist   Transportation at Discharge: Possible Medicaid transport or family   [place] or means to access home:  Family have keys      IM Medicare letter: N/A  Caregiver Contact: Fany Brunson (friend) [phone]  Discharge Caregiver contacted prior to discharge? Family will be contacted at the time of d/c.    CM informed by Complex CM that she had spoke with admissions coordinator at [facility], and that they are able to take pt's that are currently on methadone. CM informed to send referral to [facility]: [facility] and [address] [place] [address] (acceptance pending). Once accepted to facility, pt will require insurance auth. INSURANCE AUTH CAN TAKE 24-48HRS FOR APPROVAL. Pt will require covid test (rapid) at the time of d/c. Pt will require medical transport at the time of d/c. CM will continue to follow.     JUVENCIO Man, [facility]

## 2021-07-12 NOTE — PROGRESS NOTES
End of Shift Note    Bedside shift change report given to EMERY Hodgson (oncoming nurse) by Zackary Cai (offgoing nurse). Report included the following information SBAR, Kardex, Intake/Output, MAR and Recent Results    Shift worked:  7am-7pm     Shift summary and any significant changes:     Pt rested. Received scheduled meds and pain meds. Pt had some extreme pain in LLE this afternoon and one time dose of IV morphine was ordered and given. No other significant changes. Concerns for physician to address:  None. Zone phone for oncoming shift:   [phone]       Activity:  Activity Level: Chair  Number times ambulated in hallways past shift: 0  Number of times OOB to chair past shift: 0    Cardiac:   Cardiac Monitoring: No      Cardiac Rhythm: Sinus Rhythm    Access:   Current line(s): PIV     Genitourinary:   Urinary status: voiding    Respiratory:   O2 Device: None (Room air)  Chronic home O2 use?: NO  Incentive spirometer at bedside: N/A     GI:  Last Bowel Movement Date: 07/11/21  Current diet:  ADULT ORAL NUTRITION SUPPLEMENT Dinner, Breakfast; Wound Healing Supplement  ADULT DIET Regular; Semd 1-8oz bottle water every meal tray  ADULT ORAL NUTRITION SUPPLEMENT Breakfast, Dinner, Lunch; Low Calorie/High Protein  Passing flatus: YES  Tolerating current diet: YES       Pain Management:   Patient states pain is manageable on current regimen: NO    Skin:  Dejuan Score: 20  Interventions: float heels and increase time out of bed    Patient Safety:  Fall Score:  Total Score: 3  Interventions: bed/chair alarm, assistive device (walker, cane, etc), gripper socks, pt to call before getting OOB and stay with me (per policy)  High Fall Risk: Yes    Length of Stay:  Expected LOS: 3d 12h  Actual LOS: Bridget Villegas 1213

## 2021-07-12 NOTE — PROGRESS NOTES
End of Shift Note    Bedside shift change report given to Nicholas Amezquita RN (oncoming nurse) by Marco A Fritz RN (offgoing nurse). Report included the following information SBAR, Kardex, Intake/Output, MAR and Med Rec Status    Shift worked:  7p-7a     Shift summary and any significant changes:     Patient up ad dionte in room; transferring self to and from bed/bathroom with wheelchair independently. Patient refuses to call for assistance. Encouraged to decrease weight on LLE on transfers and elevate Lt heel when in bed. LLE dressing reinforced. Patient states pain not managed with current regimen. Concerns for physician to address:  N/A     Zone phone for oncoming shift:   [phone]       Activity:  Activity Level: Up ad dionte  Number times ambulated in hallways past shift: 0  Number of times OOB to chair past shift: 4 (wheelchair)    Cardiac:   Cardiac Monitoring: No      Cardiac Rhythm: Sinus Rhythm    Access:   Current line(s): PIV     Genitourinary:   Urinary status: voiding    Respiratory:   O2 Device: None (Room air)  Chronic home O2 use?: NO  Incentive spirometer at bedside: N/A     GI:  Last Bowel Movement Date: 07/11/21  Current diet:  ADULT ORAL NUTRITION SUPPLEMENT Dinner, Breakfast; Wound Healing Supplement  ADULT DIET Regular; Semd 1-8oz bottle water every meal tray  ADULT ORAL NUTRITION SUPPLEMENT Breakfast, Dinner, Lunch; Low Calorie/High Protein  Passing flatus: YES  Tolerating current diet: YES       Pain Management:   Patient states pain is manageable on current regimen: NO    Skin:  Dejuan Score: 19  Interventions: float heels and increase time out of bed    Patient Safety:  Fall Score:  Total Score: 3  Interventions: gripper socks and pt to call before getting OOB, assistive device  High Fall Risk: Yes    Length of Stay:  Expected LOS: 3d 12h  Actual LOS: EMERY Humphrey

## 2021-07-12 NOTE — PROGRESS NOTES
Problem: Risk for Spread of Infection  Goal: Prevent transmission of infectious organism to others  Description: Prevent the transmission of infectious organisms to other patients, staff members, and visitors. Outcome: Progressing Towards Goal     Problem: Patient Education:  Go to Education Activity  Goal: Patient/Family Education  Outcome: Progressing Towards Goal     Problem: Falls - Risk of  Goal: *Absence of Falls  Description: Document Heydi Gayle Fall Risk and appropriate interventions in the flowsheet.   Outcome: Progressing Towards Goal  Note: Fall Risk Interventions:  Mobility Interventions: Bed/chair exit alarm, Utilize walker, cane, or other assistive device    Mentation Interventions: Adequate sleep, hydration, pain control    Medication Interventions: Teach patient to arise slowly, Patient to call before getting OOB, Bed/chair exit alarm    Elimination Interventions: Call light in reach    History of Falls Interventions: Bed/chair exit alarm, Room close to nurse's station         Problem: Patient Education: Go to Patient Education Activity  Goal: Patient/Family Education  Outcome: Progressing Towards Goal     Problem: Patient Education: Go to Patient Education Activity  Goal: Patient/Family Education  Outcome: Progressing Towards Goal     Problem: Sepsis: Day 6  Goal: Off Pathway (Use only if patient is Off Pathway)  Outcome: Progressing Towards Goal  Goal: *Oxygen saturation within defined limits  Outcome: Progressing Towards Goal  Goal: *Vital signs within defined limits  Outcome: Progressing Towards Goal  Goal: *Tolerating diet  Outcome: Progressing Towards Goal  Goal: *Demonstrates progressive activity  Outcome: Progressing Towards Goal  Goal: Influenza immunization  Outcome: Progressing Towards Goal  Goal: *Pneumococcal immunization  Outcome: Progressing Towards Goal  Goal: Activity/Safety  Outcome: Progressing Towards Goal  Goal: Diagnostic Test/Procedures  Outcome: Progressing Towards Goal  Goal: Nutrition/Diet  Outcome: Progressing Towards Goal  Goal: Discharge Planning  Outcome: Progressing Towards Goal  Goal: Medications  Outcome: Progressing Towards Goal  Goal: Respiratory  Outcome: Progressing Towards Goal  Goal: Treatments/Interventions/Procedures  Outcome: Progressing Towards Goal  Goal: Psychosocial  Outcome: Progressing Towards Goal     Problem: Sepsis: Discharge Outcomes  Goal: *Vital signs within defined limits  Outcome: Progressing Towards Goal  Goal: *Tolerating diet  Outcome: Progressing Towards Goal  Goal: *Verbalizes understanding and describes prescribed diet  Outcome: Progressing Towards Goal  Goal: *Demonstrates progressive activity  Outcome: Progressing Towards Goal  Goal: *Describes follow-up/return visits to physicians  Outcome: Progressing Towards Goal  Goal: *Verbalizes name, dosage, time, side effects, and number of days to continue medications  Outcome: Progressing Towards Goal  Goal: *Influenza immunization (Oct-Mar only)  Outcome: Progressing Towards Goal  Goal: *Pneumococcal immunization  Outcome: Progressing Towards Goal  Goal: *Lungs clear or at baseline  Outcome: Progressing Towards Goal  Goal: *Oxygen saturation returns to baseline or 90% or better on room air  Outcome: Progressing Towards Goal  Goal: *Glycemic control  Outcome: Progressing Towards Goal  Goal: *Absence of deep venous thrombosis signs and symptoms(Stroke Metric)  Outcome: Progressing Towards Goal  Goal: *Describes available resources and support systems  Outcome: Progressing Towards Goal  Goal: *Optimal pain control at patient's stated goal  Outcome: Progressing Towards Goal     Problem: Hypertension  Goal: *Blood pressure within specified parameters  Outcome: Progressing Towards Goal  Goal: *Fluid volume balance  Outcome: Progressing Towards Goal  Goal: *Labs within defined limits  Outcome: Progressing Towards Goal     Problem: Patient Education: Go to Patient Education Activity  Goal: Patient/Family Education  Outcome: Progressing Towards Goal     Problem: Pressure Injury - Risk of  Goal: *Prevention of pressure injury  Description: Document Dejuan Scale and appropriate interventions in the flowsheet.   Outcome: Progressing Towards Goal     Problem: Patient Education: Go to Patient Education Activity  Goal: Patient/Family Education  Outcome: Progressing Towards Goal     Problem: Pain  Goal: *Control of Pain  Outcome: Progressing Towards Goal  Goal: *PALLIATIVE CARE:  Alleviation of Pain  Outcome: Progressing Towards Goal     Problem: Patient Education: Go to Patient Education Activity  Goal: Patient/Family Education  Outcome: Progressing Towards Goal

## 2021-07-12 NOTE — PROGRESS NOTES
Hospitalist Progress Note    NAME: Amie Parra   :  1965   MRN:  [id number]       Assessment / Plan:  Severe sepsis POA resolved  Left leg necrotic ulcer due to prior trauma and pyoderma gangrenosum  Intractable leg pain    Has been following with Dr Dawna Galeazzi (derm) since 2020 and has tried multiple courses of oral steroids. May need cyclosporin or humira. Had wound debridement at [facility] in  which may have worsened the wounds in retrospect.       -Patient needs formal evaluation in an academic center. His current social situation has remained a barrier for consistent follow up, wound care or subspecialty consultation. Ab course completed  Continue wound care  Appreciate palliative team help for pain management.     I called [facility] transfer center on , spoke with Triage MD, still not accepting non cardiac/stroke transfer. Was also tried twice by prior hospitalist on  and . CM was able to set up [facility] dermatology appointment for last week, and was planned for d/c with home health last week, however he mentioned he cannot go d/t his home situation/noone currently to take care of him. Now plan is to send him to SNF, with outpatient dermatology appointment, which is setup already     LUE swelling, improved with diuresis  Gout  -LUE swelling has improved. Steroid course completed. Doesn't look like gout. C/w colchicine     GERD  -pepcid bid     HTN  -continue lisinopril     Hypothyroid  -continue levothyroxine     Hx bladder cancer  Hx stroke from aneurysm with left sided weakness  -continue aspirin     Chronic methadone use: Continue home dose methadone      Obesity: BMI 31. Counseling about lifestyle modification provided        Body mass index is 31.66 kg/m².   Estimated discharge date: Waiting SNF placment now  Barriers: Complex care case     Code status: Full  Prophylaxis: Lovenox  Recommended Disposition: TBD     Subjective:     Chief Complaint / Reason for Physician Visit  Follow up for Left leg necrotic ulcer  He continues to complain of leg pain    Review of Systems:  Symptom Y/N Comments  Symptom Y/N Comments   Fever/Chills n   Chest Pain n    Poor Appetite n   Edema n    Cough    Abdominal Pain     Sputum    Joint Pain     SOB/BECK    Pruritis/Rash     Nausea/vomit    Tolerating PT/OT     Diarrhea    Tolerating Diet     Constipation    Other       Could NOT obtain due to:      Objective:     VITALS:   Last 24hrs VS reviewed since prior progress note. Most recent are:  Patient Vitals for the past 24 hrs:   Temp Pulse Resp BP SpO2   07/12/21 1045 98 °F (36.7 °C) (!) 103 17 116/81 95 %   07/12/21 0711 98.1 °F (36.7 °C) 94 18 126/86 98 %   07/12/21 0259 98.3 °F (36.8 °C) 72 18 115/81 93 %   07/11/21 2253 97.3 °F (36.3 °C) 83 18 (!) 136/98 97 %   07/11/21 1915 98.5 °F (36.9 °C) 87 18 103/67 96 %   07/11/21 1552 99.1 °F (37.3 °C) 93 18 129/88 96 %       Intake/Output Summary (Last 24 hours) at 7/12/2021 1238  Last data filed at 7/12/2021 0646  Gross per 24 hour   Intake 600 ml   Output 1600 ml   Net -1000 ml        I had a face to face encounter and independently examined this patient on 7/12/2021, as outlined below:  PHYSICAL EXAM:  General: WD, WN. Alert, cooperative, no acute distress    EENT:  EOMI. Anicteric sclerae. MMM  Resp:  CTA bilaterally, no wheezing or rales. No accessory muscle use  CV:  Regular  rhythm,  No edema  GI:  Soft, Non distended, Non tender. +Bowel sounds  Neurologic:  Alert and oriented X 3, normal speech, Left upper and lower ext weakness  Psych:   Good insight. Not anxious nor agitated  Skin:  Left lower ext bulky dressing.     Reviewed most current lab test results and cultures  YES  Reviewed most current radiology test results   YES  Review and summation of old records today    NO  Reviewed patient's current orders and MAR    YES  PMH/SH reviewed - no change compared to H&P  ________________________________________________________________________  Care Plan discussed with:    Comments   Patient y    Family      RN y    Care Manager     Consultant                        Multidiciplinary team rounds were held today with , nursing, pharmacist and clinical coordinator. Patient's plan of care was discussed; medications were reviewed and discharge planning was addressed. ________________________________________________________________________  Total NON critical care TIME:  35  Minutes    Total CRITICAL CARE TIME Spent:   Minutes non procedure based      Comments   >50% of visit spent in counseling and coordination of care     ________________________________________________________________________  Mine Mace MD     Procedures: see electronic medical records for all procedures/Xrays and details which were not copied into this note but were reviewed prior to creation of Plan. LABS:  I reviewed today's most current labs and imaging studies. Pertinent labs include:  No results for input(s): WBC, HGB, HCT, PLT, HGBEXT, HCTEXT, PLTEXT, HGBEXT, HCTEXT, PLTEXT in the last 72 hours. No results for input(s): NA, K, CL, CO2, GLU, BUN, CREA, CA, MG, PHOS, ALB, TBIL, TBILI, ALT, INR, INREXT, INREXT in the last 72 hours.     No lab exists for component: SGOT    Signed: Mine Mace MD

## 2021-07-12 NOTE — PROGRESS NOTES
Problem: Risk for Spread of Infection  Goal: Prevent transmission of infectious organism to others  Description: Prevent the transmission of infectious organisms to other patients, staff members, and visitors. Outcome: Progressing Towards Goal     Problem: Patient Education:  Go to Education Activity  Goal: Patient/Family Education  Outcome: Progressing Towards Goal     Problem: Falls - Risk of  Goal: *Absence of Falls  Description: Document Heydi Gayle Fall Risk and appropriate interventions in the flowsheet.   Outcome: Progressing Towards Goal  Note: Fall Risk Interventions:  Mobility Interventions: Utilize walker, cane, or other assistive device, Assess mobility with egress test    Mentation Interventions: Adequate sleep, hydration, pain control    Medication Interventions: Patient to call before getting OOB, Teach patient to arise slowly    Elimination Interventions: Call light in reach    History of Falls Interventions: Evaluate medications/consider consulting pharmacy         Problem: Patient Education: Go to Patient Education Activity  Goal: Patient/Family Education  Outcome: Progressing Towards Goal     Problem: Patient Education: Go to Patient Education Activity  Goal: Patient/Family Education  Outcome: Progressing Towards Goal     Problem: Sepsis: Day 6  Goal: Off Pathway (Use only if patient is Off Pathway)  Outcome: Progressing Towards Goal  Goal: *Oxygen saturation within defined limits  Outcome: Progressing Towards Goal  Goal: *Vital signs within defined limits  Outcome: Progressing Towards Goal  Goal: *Tolerating diet  Outcome: Progressing Towards Goal  Goal: *Demonstrates progressive activity  Outcome: Progressing Towards Goal  Goal: Influenza immunization  Outcome: Progressing Towards Goal  Goal: *Pneumococcal immunization  Outcome: Progressing Towards Goal  Goal: Activity/Safety  Outcome: Progressing Towards Goal  Goal: Diagnostic Test/Procedures  Outcome: Progressing Towards Goal  Goal: Nutrition/Diet  Outcome: Progressing Towards Goal  Goal: Discharge Planning  Outcome: Progressing Towards Goal  Goal: Medications  Outcome: Progressing Towards Goal  Goal: Respiratory  Outcome: Progressing Towards Goal  Goal: Treatments/Interventions/Procedures  Outcome: Progressing Towards Goal  Goal: Psychosocial  Outcome: Progressing Towards Goal     Problem: Sepsis: Discharge Outcomes  Goal: *Vital signs within defined limits  Outcome: Progressing Towards Goal  Goal: *Tolerating diet  Outcome: Progressing Towards Goal  Goal: *Verbalizes understanding and describes prescribed diet  Outcome: Progressing Towards Goal  Goal: *Demonstrates progressive activity  Outcome: Progressing Towards Goal  Goal: *Describes follow-up/return visits to physicians  Outcome: Progressing Towards Goal  Goal: *Verbalizes name, dosage, time, side effects, and number of days to continue medications  Outcome: Progressing Towards Goal  Goal: *Influenza immunization (Oct-Mar only)  Outcome: Progressing Towards Goal  Goal: *Pneumococcal immunization  Outcome: Progressing Towards Goal  Goal: *Lungs clear or at baseline  Outcome: Progressing Towards Goal  Goal: *Oxygen saturation returns to baseline or 90% or better on room air  Outcome: Progressing Towards Goal  Goal: *Glycemic control  Outcome: Progressing Towards Goal  Goal: *Absence of deep venous thrombosis signs and symptoms(Stroke Metric)  Outcome: Progressing Towards Goal  Goal: *Describes available resources and support systems  Outcome: Progressing Towards Goal  Goal: *Optimal pain control at patient's stated goal  Outcome: Progressing Towards Goal     Problem: Hypertension  Goal: *Blood pressure within specified parameters  Outcome: Progressing Towards Goal  Goal: *Fluid volume balance  Outcome: Progressing Towards Goal  Goal: *Labs within defined limits  Outcome: Progressing Towards Goal     Problem: Patient Education: Go to Patient Education Activity  Goal: Patient/Family Education  Outcome: Progressing Towards Goal     Problem: Pressure Injury - Risk of  Goal: *Prevention of pressure injury  Description: Document Dejuan Scale and appropriate interventions in the flowsheet.   Outcome: Progressing Towards Goal     Problem: Patient Education: Go to Patient Education Activity  Goal: Patient/Family Education  Outcome: Progressing Towards Goal     Problem: Pain  Goal: *Control of Pain  Outcome: Progressing Towards Goal  Goal: *PALLIATIVE CARE:  Alleviation of Pain  Outcome: Progressing Towards Goal     Problem: Patient Education: Go to Patient Education Activity  Goal: Patient/Family Education  Outcome: Progressing Towards Goal

## 2021-07-12 NOTE — PROGRESS NOTES
Complex Care Management Note:    RUR:  30%  LOS:  17 Days      Assisting Unit CM with disposition planning for this patient. Met with patient this morning - upon entering room he was on phone with friend, Srinivasa Hylton who is primary caregiver for patient. Srinivasa Hylton asked to speak with me and we discussed patient disposition plans. Advised Venkata that patient is medically stable for discharge; has follow up with Dermatology at [facility] later this month. Srinivasa Warner is working with patient on temporary housing at an Adult Home (or some type of similar facility) as she needs to get a Ramp built for home for patient and her mother to utilize. Srinivasa Hylton has been providing daily wound care and will continue to do so at discharge, even if patient goes to the \"adult Home\". We discussed Methadone for patient - he currently will need to get Methadone on a daily basis for 7 days; then patient will transition to once weekly visit to clinic and receive 7 doses to use for each week. Srinivasa Hylton is to call this CM this afternoon and update on posibility of patient going to adult home temporarily. As a back up plan, continue to work on NH placement if needed. Review conversation with Ina, Unit CM who is following patient.     Marga Aldona Castleman, BSW, CCM, ACM-SW  Complex CM Coordinator/[employer]  [phone]

## 2021-07-13 PROCEDURE — 74011250636 HC RX REV CODE- 250/636: Performed by: HOSPITALIST

## 2021-07-13 PROCEDURE — 74011000250 HC RX REV CODE- 250: Performed by: STUDENT IN AN ORGANIZED HEALTH CARE EDUCATION/TRAINING PROGRAM

## 2021-07-13 PROCEDURE — 74011250637 HC RX REV CODE- 250/637: Performed by: HOSPITALIST

## 2021-07-13 PROCEDURE — 65270000029 HC RM PRIVATE

## 2021-07-13 PROCEDURE — 74011250637 HC RX REV CODE- 250/637: Performed by: NURSE PRACTITIONER

## 2021-07-13 PROCEDURE — 74011250637 HC RX REV CODE- 250/637: Performed by: STUDENT IN AN ORGANIZED HEALTH CARE EDUCATION/TRAINING PROGRAM

## 2021-07-13 PROCEDURE — 74011250637 HC RX REV CODE- 250/637: Performed by: INTERNAL MEDICINE

## 2021-07-13 RX ADMIN — SODIUM CHLORIDE 5 ML: 9 INJECTION, SOLUTION INTRAMUSCULAR; INTRAVENOUS; SUBCUTANEOUS at 05:28

## 2021-07-13 RX ADMIN — MAGNESIUM OXIDE 400 MG (241.3 MG MAGNESIUM) TABLET 400 MG: TABLET at 17:26

## 2021-07-13 RX ADMIN — SODIUM CHLORIDE 5 ML: 9 INJECTION, SOLUTION INTRAMUSCULAR; INTRAVENOUS; SUBCUTANEOUS at 21:30

## 2021-07-13 RX ADMIN — FAMOTIDINE 20 MG: 20 TABLET ORAL at 17:26

## 2021-07-13 RX ADMIN — GABAPENTIN 300 MG: 300 CAPSULE ORAL at 09:33

## 2021-07-13 RX ADMIN — TRIAMCINOLONE ACETONIDE: 1 OINTMENT TOPICAL at 18:55

## 2021-07-13 RX ADMIN — ONDANSETRON 4 MG: 2 INJECTION INTRAMUSCULAR; INTRAVENOUS at 14:47

## 2021-07-13 RX ADMIN — ACETAMINOPHEN 650 MG: 325 TABLET ORAL at 17:26

## 2021-07-13 RX ADMIN — TRIAMCINOLONE ACETONIDE: 1 OINTMENT TOPICAL at 09:39

## 2021-07-13 RX ADMIN — LIDOCAINE: 40 CREAM TOPICAL at 02:00

## 2021-07-13 RX ADMIN — LEVOTHYROXINE SODIUM 125 MCG: 0.12 TABLET ORAL at 05:18

## 2021-07-13 RX ADMIN — Medication 3 MG: at 21:32

## 2021-07-13 RX ADMIN — Medication 1 CAPSULE: at 09:33

## 2021-07-13 RX ADMIN — ACETAMINOPHEN 650 MG: 325 TABLET ORAL at 11:11

## 2021-07-13 RX ADMIN — GABAPENTIN 300 MG: 300 CAPSULE ORAL at 17:26

## 2021-07-13 RX ADMIN — METHADONE HYDROCHLORIDE 140 MG: 10 CONCENTRATE ORAL at 05:18

## 2021-07-13 RX ADMIN — COLCHICINE 1.2 MG: 0.6 TABLET, FILM COATED ORAL at 09:33

## 2021-07-13 RX ADMIN — GABAPENTIN 300 MG: 300 CAPSULE ORAL at 21:30

## 2021-07-13 RX ADMIN — OXYCODONE 20 MG: 5 TABLET ORAL at 09:33

## 2021-07-13 RX ADMIN — FAMOTIDINE 20 MG: 20 TABLET ORAL at 09:33

## 2021-07-13 RX ADMIN — MAGNESIUM OXIDE 400 MG (241.3 MG MAGNESIUM) TABLET 400 MG: TABLET at 09:33

## 2021-07-13 RX ADMIN — OXYCODONE 20 MG: 5 TABLET ORAL at 20:31

## 2021-07-13 RX ADMIN — ACETAMINOPHEN 650 MG: 325 TABLET ORAL at 03:48

## 2021-07-13 RX ADMIN — ASPIRIN 81 MG: 81 TABLET, CHEWABLE ORAL at 09:33

## 2021-07-13 RX ADMIN — TAMSULOSIN HYDROCHLORIDE 0.4 MG: 0.4 CAPSULE ORAL at 09:33

## 2021-07-13 RX ADMIN — SODIUM CHLORIDE 10 ML: 9 INJECTION, SOLUTION INTRAMUSCULAR; INTRAVENOUS; SUBCUTANEOUS at 14:15

## 2021-07-13 RX ADMIN — OXYCODONE 20 MG: 5 TABLET ORAL at 14:46

## 2021-07-13 RX ADMIN — OXYCODONE 20 MG: 5 TABLET ORAL at 01:17

## 2021-07-13 NOTE — PROGRESS NOTES
End of Shift Note    Bedside shift change report given to EMERY Hodgson (oncoming nurse) by Tico Ambriz (offgoing nurse). Report included the following information SBAR, Kardex, Intake/Output, MAR and Recent Results    Shift worked:  7am-7pm     Shift summary and any significant changes:     Pt rested. Received scheduled meds and pain meds. Wound care performed at end of shift. No other significant changes. Concerns for physician to address:  Pt needs more pain meds. Zone phone for oncoming shift:   [phone]       Activity:  Activity Level: Chair  Number times ambulated in hallways past shift: 0  Number of times OOB to chair past shift: 0    Cardiac:   Cardiac Monitoring: No      Cardiac Rhythm: Sinus Rhythm    Access:   Current line(s): PIV     Genitourinary:   Urinary status: voiding    Respiratory:   O2 Device: None (Room air)  Chronic home O2 use?: NO  Incentive spirometer at bedside: N/A     GI:  Last Bowel Movement Date: 07/11/21  Current diet:  ADULT ORAL NUTRITION SUPPLEMENT Dinner, Breakfast; Wound Healing Supplement  ADULT DIET Regular; Semd 1-8oz bottle water every meal tray  ADULT ORAL NUTRITION SUPPLEMENT Breakfast, Dinner, Lunch; Low Calorie/High Protein  Passing flatus: YES  Tolerating current diet: YES       Pain Management:   Patient states pain is manageable on current regimen: NO    Skin:  Dejuan Score: 19  Interventions: float heels and increase time out of bed    Patient Safety:  Fall Score:  Total Score: 3  Interventions: bed/chair alarm, assistive device (walker, cane, etc), gripper socks, pt to call before getting OOB and stay with me (per policy)  High Fall Risk: Yes    Length of Stay:  Expected LOS: 3d 12h  Actual LOS: Raina

## 2021-07-13 NOTE — PROGRESS NOTES
End of Shift Note    Bedside shift change report given to [facility] [address] (oncoming nurse) by Fito Dubon RN (offgoing nurse). Report included the following information SBAR, Kardex, Intake/Output, MAR and Recent Results    Shift worked:  7p-7a     Shift summary and any significant changes:     RLE dressing changed this morning. Patient medicated for pain throughout shift. Concerns for physician to address:  N/A     [facility] phone for oncoming shift:   [phone]       Activity:  Activity Level: Chair  Number times ambulated in hallways past shift: 0  Number of times OOB to chair past shift: 1    Cardiac:   Cardiac Monitoring: No      Cardiac Rhythm: Sinus Rhythm    Access:   Current line(s): PIV     Genitourinary:   Urinary status: voiding    Respiratory:   O2 Device: None (Room air)  Chronic home O2 use?: NO  Incentive spirometer at bedside: N/A     GI:  Last Bowel Movement Date: 07/11/21  Current diet:  ADULT ORAL NUTRITION SUPPLEMENT Dinner, Breakfast; Wound Healing Supplement  ADULT DIET Regular; Semd 1-8oz bottle water every meal tray  ADULT ORAL NUTRITION SUPPLEMENT Breakfast, Dinner, Lunch; Low Calorie/High Protein  Passing flatus: YES  Tolerating current diet: YES       Pain Management:   Patient states pain is manageable on current regimen: NO    Skin:  Dejuan Score: 19  Interventions: increase time out of bed    Patient Safety:  Fall Score:  Total Score: 3  Interventions: assistive device (walker, cane, etc), gripper socks and pt to call before getting OOB  High Fall Risk: Yes    Length of Stay:  Expected LOS: 3d 12h  Actual LOS: HARLEEN Alexander RN

## 2021-07-13 NOTE — PROGRESS NOTES
Transition of Care Plan:    RUR:  30%  LOS:  18 Days    Disposition:  NH vs. Home  Caregiver Contact:  Arianna Blevins (Friend)  [phone]    Received call from Arianna Gen, friend of patient this morning and had long conversation regarding disposition plans. Adult Home close to [facility] does not have any openings and is not currently an option. Have reached out to several NH's but none are willing/able to accept patient on Methadone treatment. Patient is required to go to Methadone Clinic for 7 days in a row at discharge; then would receive methadone supply one time each week. This is at high risk for the facilities due to transition of medications, travel for patient, etc.    Explained to [facility] that [address] may not be an option, and that if a facility is not found in the next few days, patient will likely need to return to the hotel where he had stayed previously. [facility] reported that the [facility] have already expressed concerns about the frequent calls they had received to move patient down stairs, etc., which cannot be an on-going plan. Discussed with the Attending today and will review with Unit CM today as well. ADDENDUM  2:40PM  Received call from [facility] and provided additional information. They have accepted patient pending insurance auth. LTSS submitted for LTC placement (less than 3 months). Obtained information on Methadone Clinic patient is currently using (see below). Unable to reach today as office hours are 6A - 12P. Will attempt to contact tomorrow.     [facility] ([phone])  [facility] [place], [place]  [address], BSW, CCM, ACM-SW  Complex CM Coordinator/[employer]  [phone]

## 2021-07-13 NOTE — PROGRESS NOTES
Hospitalist Progress Note    NAME: Elizabeth Garcia   :  1965   MRN:  [id number]       Assessment / Plan:  Severe sepsis POA resolved  Left leg necrotic ulcer due to prior trauma and pyoderma gangrenosum  Intractable leg pain     Has been following with Dr Abdiel Call (derm) since 2020 and has tried multiple courses of oral steroids.  May need cyclosporin or humira.  Had wound debridement at [facility] in  which may have worsened the wounds in retrospect.       -Patient needs formal evaluation in an academic center. [facility] current social situation has remained a barrier for consistent follow up, wound care or subspecialty consultation.      Ab course completed  Continue wound care  Appreciate palliative team help for pain management.     I called [facility] transfer center on , spoke with Triage MD, still not accepting non cardiac/stroke transfer. Was also tried twice by prior hospitalist on  and . CM was able to set up [facility] dermatology appointment for last week, and was planned for d/c with home health last week, however he mentioned he cannot go d/t his home situation/noone currently to take care of him. Now plan is to send him to SNF, with outpatient dermatology appointment, which is setup already     LUE swelling, improved with diuresis  Gout  -LUE swelling has improved.    Steroid course completed. Doesn't look like gout. C/w colchicine     GERD  -pepcid bid     HTN  -continue lisinopril     Hypothyroid  -continue levothyroxine     Hx bladder cancer  Hx stroke from aneurysm with left sided weakness  -continue aspirin     Chronic methadone use: Continue home dose methadone      Obesity: BMI 31. Counseling about lifestyle modification provided        Body mass index is 31.66 kg/m².   Estimated discharge date: Waiting SNF placment now  Barriers: Complex care case     Code status: Full  Prophylaxis: Lovenox  Recommended Disposition: TBD           Subjective:     Chief Complaint / Reason for Physician Visit  . Discussed with RN events overnight. Waiting for placement, patient will need follow-up with dermatologist as outpatient    Review of Systems:  Symptom Y/N Comments  Symptom Y/N Comments   Fever/Chills n   Chest Pain n    Poor Appetite    Edema     Cough    Abdominal Pain     Sputum    Joint Pain     SOB/BECK n   Pruritis/Rash     Nausea/vomit    Tolerating PT/OT     Diarrhea    Tolerating Diet y    Constipation n   Other       Could NOT obtain due to:      Objective:     VITALS:   Last 24hrs VS reviewed since prior progress note. Most recent are:  Patient Vitals for the past 24 hrs:   Temp Pulse Resp BP SpO2   07/13/21 0826 99.8 °F (37.7 °C) (!) 118 20 93/71 94 %   07/13/21 0348 99.5 °F (37.5 °C) 92 20 91/74 94 %   07/13/21 0024 98.4 °F (36.9 °C) 80 20 (!) 82/73 94 %   07/12/21 2040 -- -- -- 132/87 --   07/12/21 1915 99.2 °F (37.3 °C) 82 18 (!) 81/70 92 %   07/12/21 1500 98.7 °F (37.1 °C) 78 17 112/79 90 %       Intake/Output Summary (Last 24 hours) at 7/13/2021 1446  Last data filed at 7/13/2021 0610  Gross per 24 hour   Intake 360 ml   Output 800 ml   Net -440 ml        I had a face to face encounter and independently examined this patient on 7/13/2021, as outlined below:  PHYSICAL EXAM:  General: WD, WN. Alert, cooperative, no acute distress    EENT:  EOMI. Anicteric sclerae. MMM  Resp:  CTA bilaterally, no wheezing or rales. No accessory muscle use  CV:  Regular  rhythm,  No edema  GI:  Soft, Non distended, Non tender. +Bowel sounds  Neurologic:  Alert and oriented X 3, normal speech,   Psych:   Good insight. Not anxious nor agitated  Skin:  No rashes.   No jaundice    Reviewed most current lab test results and cultures  YES  Reviewed most current radiology test results   YES  Review and summation of old records today    NO  Reviewed patient's current orders and MAR    YES  PMH/SH reviewed - no change compared to H&P  ________________________________________________________________________  Care Plan discussed with:    Comments   Patient y    Family      RN y    Care Manager     Consultant                        Multidiciplinary team rounds were held today with , nursing, pharmacist and clinical coordinator. Patient's plan of care was discussed; medications were reviewed and discharge planning was addressed. ________________________________________________________________________  Total NON critical care TIME:  35 Minutes    Total CRITICAL CARE TIME Spent:   Minutes non procedure based      Comments   >50% of visit spent in counseling and coordination of care y    ________________________________________________________________________  Fermin Del Angel MD     Procedures: see electronic medical records for all procedures/Xrays and details which were not copied into this note but were reviewed prior to creation of Plan. LABS:  I reviewed today's most current labs and imaging studies. Pertinent labs include:  No results for input(s): WBC, HGB, HCT, PLT, HGBEXT, HCTEXT, PLTEXT in the last 72 hours. No results for input(s): NA, K, CL, CO2, GLU, BUN, CREA, CA, MG, PHOS, ALB, TBIL, TBILI, ALT, INR, INREXT in the last 72 hours. No lab exists for component: SGOT    Signed:  Fermin Del Angel MD

## 2021-07-13 NOTE — PROGRESS NOTES
Problem: Risk for Spread of Infection  Goal: Prevent transmission of infectious organism to others  Description: Prevent the transmission of infectious organisms to other patients, staff members, and visitors. Outcome: Progressing Towards Goal     Problem: Patient Education:  Go to Education Activity  Goal: Patient/Family Education  Outcome: Progressing Towards Goal     Problem: Falls - Risk of  Goal: *Absence of Falls  Description: Document [place] Pillow Fall Risk and appropriate interventions in the flowsheet.   Outcome: Progressing Towards Goal  Note: Fall Risk Interventions:  Mobility Interventions: Bed/chair exit alarm, Patient to call before getting OOB    Mentation Interventions: Adequate sleep, hydration, pain control    Medication Interventions: Bed/chair exit alarm, Patient to call before getting OOB, Teach patient to arise slowly    Elimination Interventions: Urinal in reach    History of Falls Interventions: Bed/chair exit alarm, Door open when patient unattended, Room close to nurse's station, Investigate reason for fall         Problem: Patient Education: Go to Patient Education Activity  Goal: Patient/Family Education  Outcome: Progressing Towards Goal     Problem: Patient Education: Go to Patient Education Activity  Goal: Patient/Family Education  Outcome: Progressing Towards Goal     Problem: Sepsis: Day 6  Goal: Off Pathway (Use only if patient is Off Pathway)  Outcome: Progressing Towards Goal  Goal: *Oxygen saturation within defined limits  Outcome: Progressing Towards Goal  Goal: *Vital signs within defined limits  Outcome: Progressing Towards Goal  Goal: *Tolerating diet  Outcome: Progressing Towards Goal  Goal: *Demonstrates progressive activity  Outcome: Progressing Towards Goal  Goal: Influenza immunization  Outcome: Progressing Towards Goal  Goal: *Pneumococcal immunization  Outcome: Progressing Towards Goal  Goal: Activity/Safety  Outcome: Progressing Towards Goal  Goal: Diagnostic Test/Procedures  Outcome: Progressing Towards Goal  Goal: Nutrition/Diet  Outcome: Progressing Towards Goal  Goal: Discharge Planning  Outcome: Progressing Towards Goal  Goal: Medications  Outcome: Progressing Towards Goal  Goal: Respiratory  Outcome: Progressing Towards Goal  Goal: Treatments/Interventions/Procedures  Outcome: Progressing Towards Goal  Goal: Psychosocial  Outcome: Progressing Towards Goal     Problem: Sepsis: Discharge Outcomes  Goal: *Vital signs within defined limits  Outcome: Progressing Towards Goal  Goal: *Tolerating diet  Outcome: Progressing Towards Goal  Goal: *Verbalizes understanding and describes prescribed diet  Outcome: Progressing Towards Goal  Goal: *Demonstrates progressive activity  Outcome: Progressing Towards Goal  Goal: *Describes follow-up/return visits to physicians  Outcome: Progressing Towards Goal  Goal: *Verbalizes name, dosage, time, side effects, and number of days to continue medications  Outcome: Progressing Towards Goal  Goal: *Influenza immunization (Oct-Mar only)  Outcome: Progressing Towards Goal  Goal: *Pneumococcal immunization  Outcome: Progressing Towards Goal  Goal: *Lungs clear or at baseline  Outcome: Progressing Towards Goal  Goal: *Oxygen saturation returns to baseline or 90% or better on room air  Outcome: Progressing Towards Goal  Goal: *Glycemic control  Outcome: Progressing Towards Goal  Goal: *Absence of deep venous thrombosis signs and symptoms(Stroke Metric)  Outcome: Progressing Towards Goal  Goal: *Describes available resources and support systems  Outcome: Progressing Towards Goal  Goal: *Optimal pain control at patient's stated goal  Outcome: Progressing Towards Goal     Problem: Hypertension  Goal: *Blood pressure within specified parameters  Outcome: Progressing Towards Goal  Goal: *Fluid volume balance  Outcome: Progressing Towards Goal  Goal: *Labs within defined limits  Outcome: Progressing Towards Goal     Problem: Patient Education: Go to Patient Education Activity  Goal: Patient/Family Education  Outcome: Progressing Towards Goal     Problem: Pressure Injury - Risk of  Goal: *Prevention of pressure injury  Description: Document Dejuan Scale and appropriate interventions in the flowsheet.   Outcome: Progressing Towards Goal     Problem: Patient Education: Go to Patient Education Activity  Goal: Patient/Family Education  Outcome: Progressing Towards Goal     Problem: Pain  Goal: *Control of Pain  Outcome: Progressing Towards Goal  Goal: *PALLIATIVE CARE:  Alleviation of Pain  Outcome: Progressing Towards Goal     Problem: Patient Education: Go to Patient Education Activity  Goal: Patient/Family Education  Outcome: Progressing Towards Goal

## 2021-07-13 NOTE — INTERDISCIPLINARY ROUNDS
Interdisciplinary Rounds were completed on 07/13/21 for this patient. Rounds included nursing, clinical care leader, pharmacy, and case management. Plan of care discussed. See clinical pathway and/or care plan for interventions and desired outcomes.

## 2021-07-14 LAB
ANION GAP SERPL CALC-SCNC: 4 MMOL/L (ref 5–15)
BASOPHILS # BLD: 0 K/UL (ref 0–0.1)
BASOPHILS NFR BLD: 0 % (ref 0–1)
BUN SERPL-MCNC: 22 MG/DL (ref 6–20)
BUN/CREAT SERPL: 31 (ref 12–20)
CALCIUM SERPL-MCNC: 8.6 MG/DL (ref 8.5–10.1)
CHLORIDE SERPL-SCNC: 96 MMOL/L (ref 97–108)
CO2 SERPL-SCNC: 31 MMOL/L (ref 21–32)
CREAT SERPL-MCNC: 0.72 MG/DL (ref 0.7–1.3)
DIFFERENTIAL METHOD BLD: ABNORMAL
EOSINOPHIL # BLD: 0.4 K/UL (ref 0–0.4)
EOSINOPHIL NFR BLD: 3 % (ref 0–7)
ERYTHROCYTE [DISTWIDTH] IN BLOOD BY AUTOMATED COUNT: 16 % (ref 11.5–14.5)
GLUCOSE SERPL-MCNC: 124 MG/DL (ref 65–100)
HCT VFR BLD AUTO: 28.1 % (ref 36.6–50.3)
HGB BLD-MCNC: 9 G/DL (ref 12.1–17)
IMM GRANULOCYTES # BLD AUTO: 0 K/UL (ref 0–0.04)
IMM GRANULOCYTES NFR BLD AUTO: 0 % (ref 0–0.5)
LYMPHOCYTES # BLD: 1.2 K/UL (ref 0.8–3.5)
LYMPHOCYTES NFR BLD: 8 % (ref 12–49)
MCH RBC QN AUTO: 29 PG (ref 26–34)
MCHC RBC AUTO-ENTMCNC: 32 G/DL (ref 30–36.5)
MCV RBC AUTO: 90.6 FL (ref 80–99)
MONOCYTES # BLD: 1.6 K/UL (ref 0–1)
MONOCYTES NFR BLD: 11 % (ref 5–13)
NEUTS SEG # BLD: 11.6 K/UL (ref 1.8–8)
NEUTS SEG NFR BLD: 78 % (ref 32–75)
NRBC # BLD: 0 K/UL (ref 0–0.01)
NRBC BLD-RTO: 0 PER 100 WBC
PLATELET # BLD AUTO: 333 K/UL (ref 150–400)
PMV BLD AUTO: 9.7 FL (ref 8.9–12.9)
POTASSIUM SERPL-SCNC: 4.1 MMOL/L (ref 3.5–5.1)
RBC # BLD AUTO: 3.1 M/UL (ref 4.1–5.7)
RBC MORPH BLD: ABNORMAL
SODIUM SERPL-SCNC: 131 MMOL/L (ref 136–145)
WBC # BLD AUTO: 14.8 K/UL (ref 4.1–11.1)

## 2021-07-14 PROCEDURE — 74011250637 HC RX REV CODE- 250/637: Performed by: STUDENT IN AN ORGANIZED HEALTH CARE EDUCATION/TRAINING PROGRAM

## 2021-07-14 PROCEDURE — 74011250637 HC RX REV CODE- 250/637: Performed by: NURSE PRACTITIONER

## 2021-07-14 PROCEDURE — 94760 N-INVAS EAR/PLS OXIMETRY 1: CPT

## 2021-07-14 PROCEDURE — 74011250636 HC RX REV CODE- 250/636: Performed by: HOSPITALIST

## 2021-07-14 PROCEDURE — 85025 COMPLETE CBC W/AUTO DIFF WBC: CPT

## 2021-07-14 PROCEDURE — 74011250637 HC RX REV CODE- 250/637: Performed by: INTERNAL MEDICINE

## 2021-07-14 PROCEDURE — 80048 BASIC METABOLIC PNL TOTAL CA: CPT

## 2021-07-14 PROCEDURE — 74011250637 HC RX REV CODE- 250/637: Performed by: HOSPITALIST

## 2021-07-14 PROCEDURE — 65270000029 HC RM PRIVATE

## 2021-07-14 PROCEDURE — 36415 COLL VENOUS BLD VENIPUNCTURE: CPT

## 2021-07-14 RX ADMIN — LEVOTHYROXINE SODIUM 125 MCG: 0.12 TABLET ORAL at 05:38

## 2021-07-14 RX ADMIN — TRIAMCINOLONE ACETONIDE: 1 OINTMENT TOPICAL at 17:10

## 2021-07-14 RX ADMIN — FAMOTIDINE 20 MG: 20 TABLET ORAL at 08:18

## 2021-07-14 RX ADMIN — OXYCODONE 20 MG: 5 TABLET ORAL at 11:28

## 2021-07-14 RX ADMIN — GABAPENTIN 300 MG: 300 CAPSULE ORAL at 21:27

## 2021-07-14 RX ADMIN — LISINOPRIL 10 MG: 10 TABLET ORAL at 08:18

## 2021-07-14 RX ADMIN — OXYCODONE 20 MG: 5 TABLET ORAL at 00:50

## 2021-07-14 RX ADMIN — ACETAMINOPHEN 650 MG: 325 TABLET ORAL at 17:55

## 2021-07-14 RX ADMIN — METHADONE HYDROCHLORIDE 140 MG: 10 CONCENTRATE ORAL at 05:37

## 2021-07-14 RX ADMIN — MAGNESIUM OXIDE 400 MG (241.3 MG MAGNESIUM) TABLET 400 MG: TABLET at 08:18

## 2021-07-14 RX ADMIN — SODIUM CHLORIDE 10 ML: 9 INJECTION, SOLUTION INTRAMUSCULAR; INTRAVENOUS; SUBCUTANEOUS at 21:27

## 2021-07-14 RX ADMIN — OXYCODONE 20 MG: 5 TABLET ORAL at 21:32

## 2021-07-14 RX ADMIN — SODIUM CHLORIDE 5 ML: 9 INJECTION, SOLUTION INTRAMUSCULAR; INTRAVENOUS; SUBCUTANEOUS at 05:37

## 2021-07-14 RX ADMIN — GABAPENTIN 300 MG: 300 CAPSULE ORAL at 15:34

## 2021-07-14 RX ADMIN — SODIUM CHLORIDE 10 ML: 9 INJECTION, SOLUTION INTRAMUSCULAR; INTRAVENOUS; SUBCUTANEOUS at 13:59

## 2021-07-14 RX ADMIN — OXYCODONE 20 MG: 5 TABLET ORAL at 04:51

## 2021-07-14 RX ADMIN — FAMOTIDINE 20 MG: 20 TABLET ORAL at 17:10

## 2021-07-14 RX ADMIN — Medication 3 MG: at 22:26

## 2021-07-14 RX ADMIN — COLCHICINE 1.2 MG: 0.6 TABLET, FILM COATED ORAL at 08:19

## 2021-07-14 RX ADMIN — OXYCODONE 20 MG: 5 TABLET ORAL at 15:34

## 2021-07-14 RX ADMIN — ENOXAPARIN SODIUM 40 MG: 40 INJECTION SUBCUTANEOUS at 08:18

## 2021-07-14 RX ADMIN — MAGNESIUM OXIDE 400 MG (241.3 MG MAGNESIUM) TABLET 400 MG: TABLET at 17:10

## 2021-07-14 RX ADMIN — TAMSULOSIN HYDROCHLORIDE 0.4 MG: 0.4 CAPSULE ORAL at 08:18

## 2021-07-14 RX ADMIN — ACETAMINOPHEN 650 MG: 325 TABLET ORAL at 10:09

## 2021-07-14 RX ADMIN — TRIAMCINOLONE ACETONIDE: 1 OINTMENT TOPICAL at 08:18

## 2021-07-14 RX ADMIN — ACETAMINOPHEN 650 MG: 325 TABLET ORAL at 04:33

## 2021-07-14 RX ADMIN — ALLOPURINOL 100 MG: 100 TABLET ORAL at 08:18

## 2021-07-14 RX ADMIN — Medication 1 CAPSULE: at 08:18

## 2021-07-14 RX ADMIN — FUROSEMIDE 20 MG: 20 TABLET ORAL at 08:18

## 2021-07-14 RX ADMIN — GABAPENTIN 300 MG: 300 CAPSULE ORAL at 08:18

## 2021-07-14 RX ADMIN — ASPIRIN 81 MG: 81 TABLET, CHEWABLE ORAL at 08:18

## 2021-07-14 NOTE — PROGRESS NOTES
0700H- Bedside and Verbal shift change report given to ASHLEY (oncoming nurse) by Sagar Luna (offgoing nurse). Report included the following information SBAR, Kardex, ED Summary, Procedure Summary, Intake/Output, MAR, Recent Results and Med Rec Status. 1130H- Seen and examined by Dr. Milagros Castañeda with orders noted. End of Shift Note    Bedside shift change report given to ALBERTA (oncoming nurse) by Jennifer Kaufman (offgoing nurse). Report included the following information SBAR, Kardex, ED Summary, Procedure Summary, Intake/Output, MAR, Recent Results and Med Rec Status    Shift worked:  0700- 1900     Shift summary and any significant changes:     WOUND CARE DRESSING  LOW GRADE FEVER     Concerns for physician to address:  XXX     Zone phone for oncoming shift:   XXX       Activity:  Activity Level: Chair  Number times ambulated in hallways past shift: 1  Number of times OOB to chair past shift: 3    Cardiac:   Cardiac Monitoring: Yes      Cardiac Rhythm: Sinus Rhythm    Access:   Current line(s): PIV     Genitourinary:   Urinary status: voiding    Respiratory:   O2 Device: None (Room air)  Chronic home O2 use?: N/A  Incentive spirometer at bedside: N/A     GI:  Last Bowel Movement Date: 07/11/21  Current diet:  ADULT ORAL NUTRITION SUPPLEMENT Dinner, Breakfast; Wound Healing Supplement  ADULT DIET Regular; Semd 1-8oz bottle water every meal tray  ADULT ORAL NUTRITION SUPPLEMENT Breakfast, Dinner, Lunch; Low Calorie/High Protein  Passing flatus: YES  Tolerating current diet: YES       Pain Management:   Patient states pain is manageable on current regimen: YES    Skin:  Dejuan Score: 19  Interventions: increase time out of bed    Patient Safety:  Fall Score:  Total Score: 3  Interventions: pt to call before getting OOB  High Fall Risk: Yes    Length of Stay:  Expected LOS: 3d 12h  Actual LOS: [address]

## 2021-07-14 NOTE — WOUND CARE
Wound Care Follow up for the left leg and foot. For the past three days the staff and the patient have noticed more slough on the wound. Pt. Has been putting off his wound care to the evening for some reason, not wanting to do it in the morning. Tomorrow I will do the wound care for him in the morning. He agreed to this today. We are starting him back on the Santyl collagenase, which he has had in the past to loosen up the slough. CM working on getting him follow up and social management for his situation. Patient is sometimes difficult to deal with because he does not have the capacity to think rationally due to the brain injury in the past. His mobility is an issue and he cannot drive. Assessment: There are some areas of granulation buds but the slough is getting thick again The tendons are still intact but the slough is thick around them. Plan: a few barriers to good wound cleansing is the amount of sensitivity on the left leg. This is his stroke effected side and it is more sensitive to touch. The Pyoderma Gangrenosum is also very painful. Palliative care has developed a workable plan for pain control but the Lidocaine cream is not working well due to the amount of slough. We are starting Santyl collagenase to attempt to enzymatically debride the wound. This is slower but it has to be done the same time every day (or as close as possible). Note: the wound care was changed on Wednesday to include santyl.    Taylor Limon RN, BSN, [employer]

## 2021-07-14 NOTE — PROGRESS NOTES
Hospitalist Progress Note    NAME: Amie Parra   :  1965   MRN:  [id number]       Assessment / Plan:  Severe sepsis POA resolved  Left leg necrotic ulcer due to prior trauma and pyoderma gangrenosum  Intractable leg pain     Has been following with Dr Dawna Galeazzi (derm) since 2020 and has tried multiple courses of oral steroids.  May need cyclosporin or humira.  Had wound debridement at [facility] in  which may have worsened the wounds in retrospect.       -Patient needs formal evaluation in an academic center. [facility] current social situation has remained a barrier for consistent follow up, wound care or subspecialty consultation.      Ab course completed  Continue wound care  Appreciate palliative team help for pain management.       CM was able to set up [facility] dermatology appointment for last week, and was planned for d/c with home health last week, however he mentioned he cannot go d/t his home situation/noone currently to take care of him. Now plan is to send him to SNF, with outpatient dermatology appointment, which is setup already     LUE swelling, improved with diuresis  Gout  -LUE swelling has improved.    Steroid course completed. Doesn't look like gout. C/w colchicine     GERD  -pepcid bid     HTN  -continue lisinopril     Hypothyroid  -continue levothyroxine     Hx bladder cancer  Hx stroke from aneurysm with left sided weakness  -continue aspirin     Chronic methadone use: Continue home dose methadone      Obesity: BMI 31. Counseling about lifestyle modification provided        Body mass index is 31.66 kg/m². Estimated discharge date: Waiting SNF placment now  Barriers: Complex care case     Code status: Full  Prophylaxis: Lovenox  Recommended Disposition: TBD              Subjective:     Chief Complaint / Reason for Physician Visit    Discussed with RN events overnight.   No acute event overnight, waiting for placement    Review of Systems:  Symptom Y/N Comments  Symptom Y/N Comments Fever/Chills n   Chest Pain n    Poor Appetite    Edema     Cough    Abdominal Pain     Sputum    Joint Pain     SOB/BECK n   Pruritis/Rash     Nausea/vomit    Tolerating PT/OT     Diarrhea    Tolerating Diet y    Constipation n   Other       Could NOT obtain due to:      Objective:     VITALS:   Last 24hrs VS reviewed since prior progress note. Most recent are:  Patient Vitals for the past 24 hrs:   Temp Pulse Resp BP SpO2   07/14/21 1150 99 °F (37.2 °C) (!) 104 19 (!) 113/51 95 %   07/14/21 0827 (!) 100.6 °F (38.1 °C) 98 19 119/85 94 %   07/14/21 0340 99.1 °F (37.3 °C) 95 20 108/89 98 %   07/14/21 0008 98.4 °F (36.9 °C) 78 19 120/79 93 %   07/13/21 2034 97.8 °F (36.6 °C) 96 18 111/74 95 %   07/13/21 1542 98.4 °F (36.9 °C) 86 18 (!) 101/56 93 %       Intake/Output Summary (Last 24 hours) at 7/14/2021 1502  Last data filed at 7/14/2021 2546  Gross per 24 hour   Intake 970 ml   Output 800 ml   Net 170 ml        I had a face to face encounter and independently examined this patient on 7/14/2021, as outlined below:  PHYSICAL EXAM:  General: WD, WN. Alert, cooperative, no acute distress    EENT:  EOMI. Anicteric sclerae. MMM  Resp:  CTA bilaterally, no wheezing or rales. No accessory muscle use  CV:  Regular  rhythm,  No edema  GI:  Soft, Non distended, Non tender. +Bowel sounds  Neurologic:  Alert and oriented X 3, normal speech,   Psych:   Good insight. Not anxious nor agitated  Skin:  No rashes.   No jaundice    Reviewed most current lab test results and cultures  YES  Reviewed most current radiology test results   YES  Review and summation of old records today    NO  Reviewed patient's current orders and MAR    YES  PMH/SH reviewed - no change compared to H&P  ________________________________________________________________________  Care Plan discussed with:    Comments   Patient y    Family      RN y    Care Manager     Consultant                        Multidiciplinary team rounds were held today with case manager, nursing, pharmacist and clinical coordinator. Patient's plan of care was discussed; medications were reviewed and discharge planning was addressed. ________________________________________________________________________  Total NON critical care TIME:  35    Minutes    Total CRITICAL CARE TIME Spent:   Minutes non procedure based      Comments   >50% of visit spent in counseling and coordination of care y    ________________________________________________________________________  Maco Hector MD     Procedures: see electronic medical records for all procedures/Xrays and details which were not copied into this note but were reviewed prior to creation of Plan. LABS:  I reviewed today's most current labs and imaging studies. Pertinent labs include:  No results for input(s): WBC, HGB, HCT, PLT, HGBEXT, HCTEXT, PLTEXT in the last 72 hours. No results for input(s): NA, K, CL, CO2, GLU, BUN, CREA, CA, MG, PHOS, ALB, TBIL, TBILI, ALT, INR, INREXT in the last 72 hours. No lab exists for component: SGOT    Signed:  Maco Hector MD

## 2021-07-14 NOTE — PROGRESS NOTES
End of Shift Note    Bedside shift change report given to Federica Olivares RN (oncoming nurse) by Keeley Metzger RN (offgoing nurse). Report included the following information SBAR, Kardex, Intake/Output, MAR and Recent Results    Shift worked:  7p-7a     Shift summary and any significant changes:    LLE dressing  Clean, dry, intact. Unable to obtain labs with unit resources. PICC team called for am labs. Patient looking forward to discharging to [facility]. Concerns for physician to address:  N/A     [facility] phone for oncoming shift:   [phone]       Activity:  Activity Level: Chair  Number times ambulated in hallways past shift: 0  Number of times OOB to chair past shift: 1    Cardiac:   Cardiac Monitoring: No      Cardiac Rhythm: Sinus Rhythm    Access:   Current line(s): PIV     Genitourinary:   Urinary status: voiding    Respiratory:   O2 Device: None (Room air)  Chronic home O2 use?: NO  Incentive spirometer at bedside: N/A     GI:  Last Bowel Movement Date: 07/11/21  Current diet:  ADULT ORAL NUTRITION SUPPLEMENT Dinner, Breakfast; Wound Healing Supplement  ADULT DIET Regular; Semd 1-8oz bottle water every meal tray  ADULT ORAL NUTRITION SUPPLEMENT Breakfast, Dinner, Lunch; Low Calorie/High Protein  Passing flatus: YES  Tolerating current diet: YES       Pain Management:   Patient states pain is manageable on current regimen: NO    Skin:  Dejuan Score: 19  Interventions: increase time out of bed    Patient Safety:  Fall Score:  Total Score: 3  Interventions: assistive device (walker, cane, etc), gripper socks and pt to call before getting OOB  High Fall Risk: Yes    Length of Stay:  Expected LOS: 3d 12h  Actual LOS: EM[facility] Mann

## 2021-07-14 NOTE — PROGRESS NOTES
Problem: Risk for Spread of Infection  Goal: Prevent transmission of infectious organism to others  Description: Prevent the transmission of infectious organisms to other patients, staff members, and visitors. Outcome: Progressing Towards Goal     Problem: Patient Education:  Go to Education Activity  Goal: Patient/Family Education  Outcome: Progressing Towards Goal     Problem: Falls - Risk of  Goal: *Absence of Falls  Description: Document Ryan Raymundo Fall Risk and appropriate interventions in the flowsheet.   Outcome: Progressing Towards Goal  Note: Fall Risk Interventions:  Mobility Interventions: OT consult for ADLs, Patient to call before getting OOB    Mentation Interventions: Adequate sleep, hydration, pain control    Medication Interventions: Patient to call before getting OOB    Elimination Interventions: Call light in reach    History of Falls Interventions: Room close to nurse's station         Problem: Patient Education: Go to Patient Education Activity  Goal: Patient/Family Education  Outcome: Progressing Towards Goal     Problem: Patient Education: Go to Patient Education Activity  Goal: Patient/Family Education  Outcome: Progressing Towards Goal     Problem: Sepsis: Day 6  Goal: Off Pathway (Use only if patient is Off Pathway)  Outcome: Progressing Towards Goal  Goal: *Oxygen saturation within defined limits  Outcome: Progressing Towards Goal  Goal: *Vital signs within defined limits  Outcome: Progressing Towards Goal  Goal: *Tolerating diet  Outcome: Progressing Towards Goal  Goal: *Demonstrates progressive activity  Outcome: Progressing Towards Goal  Goal: Influenza immunization  Outcome: Progressing Towards Goal  Goal: *Pneumococcal immunization  Outcome: Progressing Towards Goal  Goal: Activity/Safety  Outcome: Progressing Towards Goal  Goal: Diagnostic Test/Procedures  Outcome: Progressing Towards Goal  Goal: Nutrition/Diet  Outcome: Progressing Towards Goal  Goal: Discharge Planning  Outcome: Progressing Towards Goal  Goal: Medications  Outcome: Progressing Towards Goal  Goal: Respiratory  Outcome: Progressing Towards Goal  Goal: Treatments/Interventions/Procedures  Outcome: Progressing Towards Goal  Goal: Psychosocial  Outcome: Progressing Towards Goal     Problem: Sepsis: Discharge Outcomes  Goal: *Vital signs within defined limits  Outcome: Progressing Towards Goal  Goal: *Tolerating diet  Outcome: Progressing Towards Goal  Goal: *Verbalizes understanding and describes prescribed diet  Outcome: Progressing Towards Goal  Goal: *Demonstrates progressive activity  Outcome: Progressing Towards Goal  Goal: *Describes follow-up/return visits to physicians  Outcome: Progressing Towards Goal  Goal: *Verbalizes name, dosage, time, side effects, and number of days to continue medications  Outcome: Progressing Towards Goal  Goal: *Influenza immunization (Oct-Mar only)  Outcome: Progressing Towards Goal  Goal: *Pneumococcal immunization  Outcome: Progressing Towards Goal  Goal: *Lungs clear or at baseline  Outcome: Progressing Towards Goal  Goal: *Oxygen saturation returns to baseline or 90% or better on room air  Outcome: Progressing Towards Goal  Goal: *Glycemic control  Outcome: Progressing Towards Goal  Goal: *Absence of deep venous thrombosis signs and symptoms(Stroke Metric)  Outcome: Progressing Towards Goal  Goal: *Describes available resources and support systems  Outcome: Progressing Towards Goal  Goal: *Optimal pain control at patient's stated goal  Outcome: Progressing Towards Goal     Problem: Hypertension  Goal: *Blood pressure within specified parameters  Outcome: Progressing Towards Goal  Goal: *Fluid volume balance  Outcome: Progressing Towards Goal  Goal: *Labs within defined limits  Outcome: Progressing Towards Goal     Problem: Patient Education: Go to Patient Education Activity  Goal: Patient/Family Education  Outcome: Progressing Towards Goal     Problem: Pressure Injury - Risk of  Goal: *Prevention of pressure injury  Description: Document Dejuan Scale and appropriate interventions in the flowsheet.   Outcome: Progressing Towards Goal     Problem: Patient Education: Go to Patient Education Activity  Goal: Patient/Family Education  Outcome: Progressing Towards Goal     Problem: Pain  Goal: *Control of Pain  Outcome: Progressing Towards Goal  Goal: *PALLIATIVE CARE:  Alleviation of Pain  Outcome: Progressing Towards Goal     Problem: Patient Education: Go to Patient Education Activity  Goal: Patient/Family Education  Outcome: Progressing Towards Goal

## 2021-07-14 NOTE — PROGRESS NOTES
Transition of Care Plan:    RUR: 31%  LOS:  19 Days  Disposition:  NH for wound care - short term  Follow up appointments:  [facility] Dermatology Appt (SEE AVS)  DME needed: N/A  Transportation at Discharge:  Ambulance  [place] or means to access home:    N/A     Medicare letter:  N/A  Caregiver Contact: Rosemary Piña (friend) [phone]  Discharge Caregiver contacted prior to discharge? Discussed with Ms. Suad Klein yesterday. Spoke with Jaja Sprague (Adm Coordinator [phone]) and [address] has accepted patient. Requested LTSS and Wound Care orders to be faxed to [phone] which was completed. Contacted [facility], who provides methadone for patient and spoke with Aviva Sandhoff, . [facility] would not be able to dispense medication and have patient give to facility for dosing due to federal laws. Called and left  for Risa Stratton. Coord at [place] requesting return call to see if their MD can provide methadone for patient during his NH stay. Awaiting return call. 1224 - Update  Received return call from Admissions Coord at [place] and they [facility] provide patient with methadone. Multiple referrals have been sent to facilities throughout [facility] without any success in identifying an accepting provider due to Methadone. Contacted Rosemary Piña, friend of patient to review and discuss new disposition plan. Ms. Suad Klein will provide name of [facility]ano worker so that LTSS ([facility]) can be sent to [place] for possible [employer]. Patient's final disposition at discharge will be determined today for patient's discharge tomorrow. When address of destination is provided, [facility] referral for RN, PT, OT services. Ms. Suad Klein will continue to provide support for patient at discharge. Continue to follow and coordinate discharge plan. 1418 - Update:  Ms. Suad Klein provided this information:  Carson Burdick  is Ms. Niranjan Ann (phone: [phone]; fax; [phone]) and LTSS faxed to Ms. Perez.     Caridad Arita Mc, BSW, CCM, ACM-SW  Complex  Coordinator/[facility]  [phone]

## 2021-07-14 NOTE — PROGRESS NOTES
Problem: Risk for Spread of Infection  Goal: Prevent transmission of infectious organism to others  Description: Prevent the transmission of infectious organisms to other patients, staff members, and visitors. Outcome: Progressing Towards Goal     Problem: Patient Education:  Go to Education Activity  Goal: Patient/Family Education  Outcome: Progressing Towards Goal     Problem: Falls - Risk of  Goal: *Absence of Falls  Description: Document [place] Fall Risk and appropriate interventions in the flowsheet.   Outcome: Progressing Towards Goal  Note: Fall Risk Interventions:  Mobility Interventions: Patient to call before getting OOB    Mentation Interventions: Adequate sleep, hydration, pain control    Medication Interventions: Patient to call before getting OOB, Teach patient to arise slowly    Elimination Interventions: Urinal in reach    History of Falls Interventions: Door open when patient unattended         Problem: Patient Education: Go to Patient Education Activity  Goal: Patient/Family Education  Outcome: Progressing Towards Goal     Problem: Patient Education: Go to Patient Education Activity  Goal: Patient/Family Education  Outcome: Progressing Towards Goal     Problem: Sepsis: Day 6  Goal: Off Pathway (Use only if patient is Off Pathway)  Outcome: Progressing Towards Goal  Goal: *Oxygen saturation within defined limits  Outcome: Progressing Towards Goal  Goal: *Vital signs within defined limits  Outcome: Progressing Towards Goal  Goal: *Tolerating diet  Outcome: Progressing Towards Goal  Goal: *Demonstrates progressive activity  Outcome: Progressing Towards Goal  Goal: Influenza immunization  Outcome: Progressing Towards Goal  Goal: *Pneumococcal immunization  Outcome: Progressing Towards Goal  Goal: Activity/Safety  Outcome: Progressing Towards Goal  Goal: Diagnostic Test/Procedures  Outcome: Progressing Towards Goal  Goal: Nutrition/Diet  Outcome: Progressing Towards Goal  Goal: Discharge Planning  Outcome: Progressing Towards Goal  Goal: Medications  Outcome: Progressing Towards Goal  Goal: Respiratory  Outcome: Progressing Towards Goal  Goal: Treatments/Interventions/Procedures  Outcome: Progressing Towards Goal  Goal: Psychosocial  Outcome: Progressing Towards Goal     Problem: Sepsis: Discharge Outcomes  Goal: *Vital signs within defined limits  Outcome: Progressing Towards Goal  Goal: *Tolerating diet  Outcome: Progressing Towards Goal  Goal: *Verbalizes understanding and describes prescribed diet  Outcome: Progressing Towards Goal  Goal: *Demonstrates progressive activity  Outcome: Progressing Towards Goal  Goal: *Describes follow-up/return visits to physicians  Outcome: Progressing Towards Goal  Goal: *Verbalizes name, dosage, time, side effects, and number of days to continue medications  Outcome: Progressing Towards Goal  Goal: *Influenza immunization (Oct-Mar only)  Outcome: Progressing Towards Goal  Goal: *Pneumococcal immunization  Outcome: Progressing Towards Goal  Goal: *Lungs clear or at baseline  Outcome: Progressing Towards Goal  Goal: *Oxygen saturation returns to baseline or 90% or better on room air  Outcome: Progressing Towards Goal  Goal: *Glycemic control  Outcome: Progressing Towards Goal  Goal: *Absence of deep venous thrombosis signs and symptoms(Stroke Metric)  Outcome: Progressing Towards Goal  Goal: *Describes available resources and support systems  Outcome: Progressing Towards Goal  Goal: *Optimal pain control at patient's stated goal  Outcome: Progressing Towards Goal     Problem: Hypertension  Goal: *Blood pressure within specified parameters  Outcome: Progressing Towards Goal  Goal: *Fluid volume balance  Outcome: Progressing Towards Goal  Goal: *Labs within defined limits  Outcome: Progressing Towards Goal     Problem: Patient Education: Go to Patient Education Activity  Goal: Patient/Family Education  Outcome: Progressing Towards Goal     Problem: Pressure Injury - Risk of  Goal: *Prevention of pressure injury  Description: Document Dejuan Scale and appropriate interventions in the flowsheet.   Outcome: Progressing Towards Goal     Problem: Patient Education: Go to Patient Education Activity  Goal: Patient/Family Education  Outcome: Progressing Towards Goal     Problem: Pain  Goal: *Control of Pain  Outcome: Progressing Towards Goal  Goal: *PALLIATIVE CARE:  Alleviation of Pain  Outcome: Progressing Towards Goal     Problem: Patient Education: Go to Patient Education Activity  Goal: Patient/Family Education  Outcome: Progressing Towards Goal

## 2021-07-15 VITALS
BODY MASS INDEX: 30.09 KG/M2 | DIASTOLIC BLOOD PRESSURE: 76 MMHG | HEART RATE: 100 BPM | OXYGEN SATURATION: 95 % | SYSTOLIC BLOOD PRESSURE: 111 MMHG | HEIGHT: 73 IN | TEMPERATURE: 98.1 F | RESPIRATION RATE: 20 BRPM | WEIGHT: 227 LBS

## 2021-07-15 PROCEDURE — 2709999900 HC NON-CHARGEABLE SUPPLY

## 2021-07-15 PROCEDURE — 74011250637 HC RX REV CODE- 250/637: Performed by: NURSE PRACTITIONER

## 2021-07-15 PROCEDURE — 74011000250 HC RX REV CODE- 250: Performed by: INTERNAL MEDICINE

## 2021-07-15 PROCEDURE — 74011250636 HC RX REV CODE- 250/636: Performed by: HOSPITALIST

## 2021-07-15 PROCEDURE — 94760 N-INVAS EAR/PLS OXIMETRY 1: CPT

## 2021-07-15 PROCEDURE — 74011250637 HC RX REV CODE- 250/637: Performed by: HOSPITALIST

## 2021-07-15 PROCEDURE — 74011250637 HC RX REV CODE- 250/637: Performed by: INTERNAL MEDICINE

## 2021-07-15 PROCEDURE — 74011250637 HC RX REV CODE- 250/637: Performed by: STUDENT IN AN ORGANIZED HEALTH CARE EDUCATION/TRAINING PROGRAM

## 2021-07-15 RX ORDER — FUROSEMIDE 20 MG/1
20 TABLET ORAL DAILY
Qty: 30 TABLET | Refills: 0 | Status: SHIPPED | OUTPATIENT
Start: 2021-07-15 | End: 2021-08-14

## 2021-07-15 RX ADMIN — TRIAMCINOLONE ACETONIDE: 1 OINTMENT TOPICAL at 10:14

## 2021-07-15 RX ADMIN — OXYCODONE 20 MG: 5 TABLET ORAL at 15:53

## 2021-07-15 RX ADMIN — TAMSULOSIN HYDROCHLORIDE 0.4 MG: 0.4 CAPSULE ORAL at 10:10

## 2021-07-15 RX ADMIN — Medication 1 CAPSULE: at 10:10

## 2021-07-15 RX ADMIN — COLCHICINE 1.2 MG: 0.6 TABLET, FILM COATED ORAL at 10:19

## 2021-07-15 RX ADMIN — ENOXAPARIN SODIUM 40 MG: 40 INJECTION SUBCUTANEOUS at 10:12

## 2021-07-15 RX ADMIN — GABAPENTIN 300 MG: 300 CAPSULE ORAL at 10:11

## 2021-07-15 RX ADMIN — GABAPENTIN 300 MG: 300 CAPSULE ORAL at 15:53

## 2021-07-15 RX ADMIN — LEVOTHYROXINE SODIUM 125 MCG: 0.12 TABLET ORAL at 05:57

## 2021-07-15 RX ADMIN — ASPIRIN 81 MG: 81 TABLET, CHEWABLE ORAL at 10:10

## 2021-07-15 RX ADMIN — OXYCODONE 20 MG: 5 TABLET ORAL at 02:22

## 2021-07-15 RX ADMIN — MAGNESIUM OXIDE 400 MG (241.3 MG MAGNESIUM) TABLET 400 MG: TABLET at 10:11

## 2021-07-15 RX ADMIN — COLLAGENASE SANTYL: 250 OINTMENT TOPICAL at 10:19

## 2021-07-15 RX ADMIN — FAMOTIDINE 20 MG: 20 TABLET ORAL at 10:10

## 2021-07-15 RX ADMIN — METHADONE HYDROCHLORIDE 140 MG: 10 CONCENTRATE ORAL at 05:59

## 2021-07-15 RX ADMIN — SODIUM CHLORIDE 10 ML: 9 INJECTION, SOLUTION INTRAMUSCULAR; INTRAVENOUS; SUBCUTANEOUS at 05:57

## 2021-07-15 RX ADMIN — OXYCODONE 20 MG: 5 TABLET ORAL at 07:12

## 2021-07-15 RX ADMIN — ACETAMINOPHEN 650 MG: 325 TABLET ORAL at 02:59

## 2021-07-15 RX ADMIN — OXYCODONE 20 MG: 5 TABLET ORAL at 10:51

## 2021-07-15 NOTE — PROGRESS NOTES
End of Shift Note    Bedside shift change report given to [address] [place] [address] (oncoming nurse) by Lino Hall RN (offgoing nurse). Report included the following information SBAR, Kardex, MAR and Recent Results    Shift worked: 7P-7A     Shift summary and any significant changes:    Pt had uneventful shift pt medicated with prn pain medicine. Pt spiked a fevr of 100.5 this shift prn tylenol given     Concerns for physician to address: None     Zone phone for oncoming shift:  [phone]     Activity:  Activity Level: Chair  Number times ambulated in hallways past shift: 0  Number of times OOB to chair past shift: 0    Cardiac:   Cardiac Monitoring: No      Cardiac Rhythm: Sinus Rhythm    Access:   Current line(s): PIV     Genitourinary:   Urinary status: voiding    Respiratory:   O2 Device: None (Room air)  Chronic home O2 use?: NO  Incentive spirometer at bedside: NO     GI:  Last Bowel Movement Date: 07/11/21  Current diet:  ADULT ORAL NUTRITION SUPPLEMENT Dinner, Breakfast; Wound Healing Supplement  ADULT DIET Regular; Semd 1-8oz bottle water every meal tray  ADULT ORAL NUTRITION SUPPLEMENT Breakfast, Dinner, Lunch; Low Calorie/High Protein  Passing flatus: YES  Tolerating current diet: YES       Pain Management:   Patient states pain is manageable on current regimen: YES    Skin:  Dejuan Score: 19  Interventions: increase time out of bed    Patient Safety:  Fall Score:  Total Score: 3  Interventions: bed/chair alarm and assistive device (walker, cane, etc)  High Fall Risk: Yes    Length of Stay:  Expected LOS: 3d 12h  Actual LOS: Katelyn Finch RN

## 2021-07-15 NOTE — DISCHARGE SUMMARY
Hospitalist Discharge Summary     Patient ID:  Lord Galeazzi  [id number]  63 y.o.  1965 6/25/2021    PCP on record: None    Admit date: 6/25/2021  Discharge date and time: 7/15/2021    DISCHARGE DIAGNOSIS:  Severe sepsis POA resolved  Left leg necrotic ulcer due to prior trauma and pyoderma gangrenosum  Intractable leg pain      CONSULTATIONS:  IP CONSULT TO HOSPITALIST  IP CONSULT TO ORTHOPEDIC SURGERY  IP CONSULT TO RHEUMATOLOGY  IP CONSULT TO INFECTIOUS DISEASES  IP CONSULT TO PALLIATIVE CARE - PROVIDER  IP CONSULT TO PAIN MANAGEMENT    Excerpted HPI from H&P of Ting Goins MD:    Lord Galeazzi is a 63 y.o. male who has chronic ulceration left lower leg and foot after water heater fell on it 2 years ago. Also diagnosed with pyoderma gangrenosum by biopsy. Past 5 days with fever 101, chills, nausea and vomiting. Past 2 days severe pain in left leg.       Hospitalized 4/21 for left leg infection. Put on zosyn and merrem by ID for wound culture growing out pseudomonas, ESBL E. Coli and enterofacaelis. MRI left lower leg without osteomyelitis. Seen by general surgeon and then orthopedic surgeon and amputation (through the knee vs AKA) recommended but patient would not make a decision.     We were asked to admit for work up and evaluation of the above problems.      ______________________________________________________________________  DISCHARGE SUMMARY/HOSPITAL COURSE:  for full details see H&P, daily progress notes, labs, consult notes.      Severe sepsis POA resolved  Left leg necrotic ulcer due to prior trauma and pyoderma gangrenosum  Intractable leg pain     Has been following with Dr Mario Huitron (derm) since Nov 2020 and has tried multiple courses of oral steroids.  May need cyclosporin or humira.  Had wound debridement at [facility] in 2020 which may have worsened the wounds in retrospect.       -Patient needs formal evaluation in an academic center. [facility] current social situation has remained a barrier for consistent follow up, wound care or subspecialty consultation.      Ab course completed  Continue wound care  Appreciate palliative team help for pain management.        CM was able to set up [facility] dermatology appointment for last week, and was planned for d/c with home health last week, however he mentioned he cannot go d/t his home situation/noone currently to take care of him. Now plan is to send him to SNF, with outpatient dermatology appointment, which is setup already     LUE swelling, improved with diuresis  Gout  -LUE swelling has improved.    Steroid course completed. Doesn't look like gout. C/w colchicine     GERD  -pepcid bid     HTN  -continue lisinopril     Hypothyroid  -continue levothyroxine     Hx bladder cancer  Hx stroke from aneurysm with left sided weakness  -continue aspirin     Chronic methadone use: Continue home dose methadone      Obesity: BMI 31. Counseling about lifestyle modification provided        Body mass index is 31.66 kg/m². Estimated discharge date: Waiting SNF placment now  Barriers: Complex care case     Code status: Full  Prophylaxis: Lovenox  Recommended Disposition: TBD                 _______________________________________________________________________  Patient seen and examined by me on discharge day. Pertinent Findings:  Gen:    Not in distress  Chest: Clear lungs  CVS:   Regular rhythm. No edema  Abd:  Soft, not distended, not tender  Neuro:  Alert,   _______________________________________________________________________  DISCHARGE MEDICATIONS:   Current Discharge Medication List      START taking these medications    Details   furosemide (LASIX) 20 mg tablet Take 1 Tablet by mouth daily for 30 days. Qty: 30 Tablet, Refills: 0  Start date: 7/15/2021, End date: 8/14/2021      collagenase (SANTYL) 250 unit/gram ointment Apply  to affected area daily.  Indications: a skin ulcer  Qty: 15 g, Refills: 0  Start date: 7/16/2021         CONTINUE these medications which have NOT CHANGED    Details   levothyroxine (SYNTHROID) 125 mcg tablet Take 125 mcg by mouth Daily (before breakfast). mycophenolate (CELLCEPT) 500 mg tablet Take 500 mg by mouth two (2) times a day. ibuprofen (MOTRIN) 200 mg tablet Take 400 mg by mouth every six (6) hours as needed for Pain. colchicine 0.6 mg tablet Take 0.6 mg by mouth daily as needed for Gout or Pain. Indications: acute inflammation of the joints due to gout attack      tamsulosin (FLOMAX) 0.4 mg capsule Take 1 capsule by mouth daily after dinner   Qty: 30 Cap, Refills: 12    Associated Diagnoses: Benign localized prostatic hyperplasia with lower urinary tract symptoms (LUTS)      lidocaine (XYLOCAINE) 4 % topical cream Apply  to affected area as needed for Pain (apply to left lower leg wounds during dressing changes). Qty: 5 Tube, Refills: 1      gabapentin (NEURONTIN) 100 mg capsule Take 100 mg by mouth three (3) times daily. lisinopriL (PRINIVIL, ZESTRIL) 20 mg tablet Take 20 mg by mouth daily. aspirin 81 mg chewable tablet Take 81 mg by mouth daily. pantoprazole (PROTONIX) 40 mg tablet Take 40 mg by mouth daily as needed. allopurinol (ZYLOPRIM) 100 mg tablet Take 1 Tab by mouth daily. Indications: treatment to prevent acute gout attack  Qty: 30 Tab, Refills: 0      senna-docusate (DARELL-COLACE) 8.6-50 mg per tablet Take 1 Tablet by mouth daily. Indications: constipation      methadone (DOLOPHINE) 10 mg/mL solution Take 140 mg by mouth daily. Indications: excessive pain      melatonin 3 mg tablet Take 1 Tab by mouth nightly as needed. Qty: 10 Tab, Refills: 0      varenicline (Chantix Starting Month Box) 0.5 mg (11)- 1 mg (42) DsPk Take  by mouth. Take one tablet by mouth in morning with food for 3 days then increase to 1 tablet by mouth twice daily with food thereafter as directed   Indications: stop smoking               Patient Follow Up Instructions:    Activity: Activity as tolerated  Diet: Cardiac Diet  Wound Care: As directed    Follow-up with PCP  in 3 days. Follow-up tests/labs     Follow-up Information     Follow up With Specialties Details Why Contact Info    [facility]   Go on 8/24/2021 APPOINTMENT TIME: 10:15am [address], [address] [facility]    [phone]    None   Follow up with PCP appointment you have scheduled for October, 2021 (per Caregiver Conversation) None ([phone]) Patient stated that they have no PCP      [address] [place] [address] Dr Crane  Schedule an appointment as soon as possible for a visit in 1 week  [address] [facility]  [phone]    Dr. Yessica Garcia  On 8/2/2021 Appointment scheduled for 2:15PM at [facility] Dermatology Appointment  [facility]  [phone]    [address] schedule at methadone clinic. [address] [address]  [phone]        follow up with PCP within 3 days   follow up with [facility]         ________________________________________________________________    Risk of deterioration: Moderate    Condition at Discharge:  Stable  __________________________________________________________________    Disposition  Home with family, no needs    ____________________________________________________________________    Code Status: Full Code  ___________________________________________________________________      Total time in minutes spent coordinating this discharge (includes going over instructions, follow-up, prescriptions, and preparing report for sign off to her PCP) :  >30 minutes    Signed:   Irineo Murray MD

## 2021-07-15 NOTE — PROGRESS NOTES
Comprehensive Nutrition Assessment    Type and Reason for Visit: Reassess    Nutrition Recommendations/Plan:   · Continue regular diet. No restrictions. · Continue Ensure High Protein or equivalent + Ten wound healing supplement 1-pkt BID. · May also benefit from a daily MVI. · Please document % meals and supplements consumed in flowsheet I/O's under intake. Nutrition Assessment:     7/15: Chart reviewed; med noted for severe sepsis, left lower leg cellulitis, chronic left leg ulcer, exrended stay pt at 20 days LOS. WOCN has been on case. RD has been following the duration of hospital stay as well. We have discussed the importance of optimal nutrition and wound healing. Documented PO intake very good % of meals. Pt also taking Ensure High Protein and Ten wound healing supplement BID. RD provided resources on where to purchase post-discharge. Patient Vitals for the past 168 hrs:   % Diet Eaten   07/14/21 1747 76 - 100%   07/14/21 1150 76 - 100%   07/14/21 0730 76 - 100%   07/12/21 0800 76 - 100%   07/09/21 1322 76 - 100%   07/09/21 0807 76 - 100%     Last Weight Metric  Weight Loss Metrics 7/11/2021 4/27/2021 12/11/2020 12/10/2020 10/21/2020 10/20/2020 9/9/2020   Today's Wt 227 lb 254 lb 9.6 oz 237 lb 14 oz - - 242 lb 235 lb   BMI 29.95 kg/m2 33.59 kg/m2 - 31.53 kg/m2 31.93 kg/m2 - 31 kg/m2   Some encounter information is confidential and restricted. Go to Review Flowsheets activity to see all data. Estimated Daily Nutrient Needs:  Energy (kcal): 2376 (BMR 2020 x 1. 3AF) - 250 kcals; Weight Used for Energy Requirements: Current  Protein (g): 135 (1.2 g/kg bw); Weight Used for Protein Requirements: Current  Fluid (ml/day): 2300 ml/day; Method Used for Fluid Requirements: 1 ml/kcal    Nutrition Related Findings:  BM: 7/11; Labs: reviewed; Meds: Lovenox, Colchine      Wounds:    Venous stasis (open wounds lower extremity)       Current Nutrition Therapies:  ADULT ORAL NUTRITION SUPPLEMENT Dinner, Breakfast; Wound Healing Supplement  ADULT DIET Regular; Semd 1-8oz bottle water every meal tray  ADULT ORAL NUTRITION SUPPLEMENT Breakfast, Dinner, Lunch; Low Calorie/High Protein    Anthropometric Measures:  · Height:  6' 1\" (185.4 cm)  · Current Body Wt:  113.9 kg (251 lb 1.7 oz)   · Ideal Body Wt:  184 lbs:  136.5 %    · BMI Category:  Obese class 1 (BMI 30.0-34. 9)       Nutrition Diagnosis:   · Increased nutrient needs related to  (severe lower leg/foot wound with osteomyelitis) as evidenced by  (increased protein and energy needs to support wound healing)    Nutrition Interventions:   Food and/or Nutrient Delivery: Continue current diet, Start oral nutrition supplement  Nutrition Education and Counseling: No recommendations at this time  Coordination of Nutrition Care: Continue to monitor while inpatient    Goals:  Maintain PO intake >75% of meals + consume 480 ml ONS next 5-7 days       Nutrition Monitoring and Evaluation:   Behavioral-Environmental Outcomes: None identified  Food/Nutrient Intake Outcomes: Food and nutrient intake, Supplement intake  Physical Signs/Symptoms Outcomes: Biochemical data, Skin, Weight, GI status    Discharge Planning:    Continue current diet, Continue oral nutrition supplement     Electronically signed by Hermelinda Peter RD on 7/15/2021 at 11:10 AM

## 2021-07-15 NOTE — PROGRESS NOTES
Problem: Risk for Spread of Infection  Goal: Prevent transmission of infectious organism to others  Description: Prevent the transmission of infectious organisms to other patients, staff members, and visitors. Outcome: Progressing Towards Goal     Problem: Falls - Risk of  Goal: *Absence of Falls  Description: Document Sharif Soto Fall Risk and appropriate interventions in the flowsheet.   Outcome: Progressing Towards Goal  Note: Fall Risk Interventions:  Mobility Interventions: OT consult for ADLs    Mentation Interventions: Adequate sleep, hydration, pain control    Medication Interventions: Patient to call before getting OOB    Elimination Interventions: Call light in reach    History of Falls Interventions: Room close to nurse's station

## 2021-07-15 NOTE — WOUND CARE
Wound Care follow up for the day of discharge. Spoke with the CM who has arranged for him to go home and receive home care with his friend, Alethea Weller, who is willing to help him. The goal for the wound care is to manage the drainage with moist but absorbent outer dressings. In the past it sometimes needs to be done more than once per day. The second goal is to get rid of the slough on the wound that is so copious and slick. The drainage appears purulent but it is actually just the breakdown of the slough. This wound is painful and I have discussed with the patient about timing the wound care with pain meds and drinking plenty of water based fluids to stay hydrated. He is having some issues with constipation. Pt. Has 4% lidocaine at home but this works best on the pinker parts of the wound bed. Assessment today: the foot and leg are covered with a mucus like slick slough that is easily removed with a good, firm wipe across the wound bed during cleansing. The santyl was just started today and most of it was applied to the foot, toes and ankle where it is more likely to do the most good. Treatment: the wound was Treated with the Lidocaine cream and allowed to dwell for 6 minutes today. Wound cleansed with Normal saline only and wiped firmly with gauze to remove the old drainage and wound debris. Santyl was applied to the NS wet dressings and the kerlix gauze was wrapped around the leg with some of the santyl applied to the wound beds this way. Covered with dry gauze and the extra 4x4's and wrapped with William and then an ACE bandage to add some compression. Plan: pt. Advised to keep the leg elevated as often as he could to control the edema and protect the heel. He needs to continue good nutrition and hydration. Follow up appointments have been made with a dermatologist at [facility] as well as getting into the outpatient wound center if needed for continued wound management.    Brendon Banda RN, BSN, [employer]

## 2021-07-15 NOTE — PROGRESS NOTES
Transition of Care Plan:    RUR:  37%  LOS:  20 Days    Disposition:  Discharge home with caregiver Jaguar Carroll)  Follow up Appointments:  [facility]; Dr. Billy Spangler (Dermatology - Primary); Patient has previous appt with Dr. Everett Joseph for October, 2021 per Caregiver (See AVS)  DME: None  (has W/C at home)  Transportation at Discharge:  [address] or means to access home:  Caregiver  IM Medicare letter:  N/a  Caregiver Contact:  Jaguar Carroll (friend) [phone]    Spoke with caregiver, Jaguar Carroll, this morning and she will be taking patient home today. Plans to  at [facility] and transport patient by car. Reviewed follow up appointments with Dr. Dawna Galeazzi and [facility]. Explained that [facility] PT/OT could not be arranged until after PCP appointment. Patient will resume methadone clinic beginning tomorrow. Prescriptions called to [facility] in [place], [place] which caregiver can . LTSS has been faxed to [address] for possible future needs. Reviewed all of this information with patient and with caregiver. Sent copy of current wound care orders with patient for Ms. Nakul Moe as she requested. Ms. Nakul Moe will be providing wound care and arranging transportation to [facility] ([address]).     Caridad Yun, BSSHARRI, CCM, ACM-  Complex CM Coordinator/[employer]  [phone]

## 2021-07-15 NOTE — DISCHARGE INSTR - DIET
· Continue Ensure High Protein or equivalent 1-2 times daily. Continue Ten wound healing supplement, 1pkt po BID. Ten orderable via [employer] or able to be purchased in most pharmacies in the nutrition supplement section. · Good nutrition is important when healing from an illness, injury, or surgery. Follow any nutrition recommendations given to you during your hospital stay. · If you were given an oral nutrition supplement while in the hospital, continue to take this supplement at home. You can take it with meals, in-between meals, and/or before bedtime. These supplements can be purchased at most local grocery stores, pharmacies, and chain super-stores. · If you have any questions about your diet or nutrition, call the hospital and ask for the dietitian.

## 2021-07-15 NOTE — PROGRESS NOTES
Provided support for this pt in [address]. Facilitated life review to assess potential support needs or coping strategies. Pt offered review of current situation. Provided pastoral support as pt processed thoughts and feelings about medical situation and the progression of his recovery. Pt seemed hopeless around his current situation and even with an attempt at trying to reframe pt's situation, pt seemed to feel defeated as he struggles to understand why his medical situation is not better than it is. Pt indicated he was preparing for discharge but expressed no excitement around that. Provided pastoral presence as pt processed next steps. Assured pt of prayers and affirmed ongoing availability of support. Lori Santos MDiv.  Staff   Request  Support/Spiritual Care Services via [facility]

## 2021-07-15 NOTE — PROGRESS NOTES
Problem: Risk for Spread of Infection  Goal: Prevent transmission of infectious organism to others  Description: Prevent the transmission of infectious organisms to other patients, staff members, and visitors. Outcome: Progressing Towards Goal     Problem: Risk for Spread of Infection  Goal: Prevent transmission of infectious organism to others  Description: Prevent the transmission of infectious organisms to other patients, staff members, and visitors.   Outcome: Progressing Towards Goal

## 2021-07-15 NOTE — PROGRESS NOTES
Discharge medications reviewed with patient and appropriate educational materials and side effects teaching were provided. Discussed with the patient and all questioned fully answered. He will call me if any problems arise. Pt given copies of med administration during encounter, d/c paperwork for caregiver PCP and wound care, wound care supplies and reviewed follow-up appointments.

## 2021-07-19 NOTE — PROGRESS NOTES
Problem: Depressed Mood (Adult/Pediatric)  Goal: *STG: Verbalizes anger, guilt, and other feelings in a constructive manor  Outcome: Progressing Towards Goal  Goal: *STG: Demonstrates reduction in symptoms and increase in insight into coping skills/future focused  Outcome: Progressing Towards Goal  Goal: *STG: Remains safe in hospital  Outcome: Progressing Towards Goal   Pt is received seated in  in dining room watching racing on the tv. He is depressed with flat affect. Sad facial expression. Patient reports his LLE is painful due to surgery on an injury he has had for one past year. \"I had surgery 3 weeks ago. I had it at [address] [place] [address]"  Patient c/o \"it hurts like hell\"  He asks for help to elevate LLE. Nurse provided small stool to elevate this. Continue q15 checks, encourage pt to share feelings, give emotional support, offer many options to help with pain off LLE and review meds that may be available. Fall precautions. Responsibility to children, family, or others/Identifies reasons for living/Supportive social network of family or friends

## 2021-08-03 PROBLEM — F32.A DEPRESSION: Status: RESOLVED | Noted: 2019-05-20 | Resolved: 2021-08-03

## 2021-08-25 NOTE — PROGRESS NOTES
Infectious Disease Note    Today's Date: 2021   Admit Date: 2021    Impression:   · Persistent fevers-resolved for 48h  · Anaerobic GPC bacteremia , source GI  · Acute appendicitis s/p lap appendectomy . No perforation found. · Mild transaminitis      Plan:   · Change to linezolid 600mg oral BID to complete a 14 day course eot 21  · No ID appt needed. He may need voucher for zyvox. · **Will sign off at this time. Please call with questions or concerns. Anti-infectives:   · Vanc -  · CTX   · Zosyn -    Subjective:   Afebrile , doing well and ready to go home. + multiple BMs yesterday       Patient is a 31 y.o. male with no medical hx who was admitted on  for acute appendicitis. CT was done to diagnose and this did not reveal perforation. Dr Lashawn Bravo performed a lap appendectomy on . Intra-OP findings with reversible ischemia to cecum, no perforation. Admitting BCs  With  bottle + GPC anaerobic. PCR with strept species. Pt was placed on Zosyn but continued to spike regular fevers. WBC is normalized. On exam, pt is healing well, no specific complaints.  was used. Rapid COVID negative. No Known Allergies     Review of Systems:  A comprehensive review of systems was negative except for that written in the History of Present Illness. Objective:     Visit Vitals  /71   Pulse 91   Temp 97.9 °F (36.6 °C)   Resp 18   Ht 5' 4\" (1.626 m)   Wt 77.1 kg (170 lb)   SpO2 98%   BMI 29.18 kg/m²     Temp (24hrs), Av.2 °F (36.8 °C), Min:97.9 °F (36.6 °C), Max:98.7 °F (37.1 °C)       Lines:  Peripheral IV:       Physical Exam:    General:  Alert, cooperative, well noursished, well developed, appears stated age   Eyes:  Sclera anicteric. Pupils equally round and reactive to light.    Mouth/Throat: Mucous membranes normal, oral pharynx clear   Neck: Supple   Lungs:   Clear to auscultation bilaterally, good effort   CV:  Regular rate and rhythm,no Problem: Falls - Risk of  Goal: *Absence of Falls  Description: Document Segundo Rodgers Fall Risk and appropriate interventions in the flowsheet.   Outcome: Progressing Towards Goal  Note: Fall Risk Interventions:  Mobility Interventions: Assess mobility with egress test, Utilize walker, cane, or other assistive device         Medication Interventions: Patient to call before getting OOB, Teach patient to arise slowly    Elimination Interventions: Call light in reach    History of Falls Interventions: Door open when patient unattended murmur, click, rub or gallop   Abdomen:   Soft, non-tender. bowel sounds normal. non-distended. No guarding, ANA drain in place    Extremities: No cyanosis or edema   Skin: Skin color, texture, turgor normal. no acute rash or lesions   Lymph nodes: Cervical and supraclavicular normal   Musculoskeletal: No swelling or deformity   Lines/Devices:  Intact, no erythema, drainage or tenderness   Psych: Alert and oriented, normal mood affect given the setting       Data Review:     CBC:  Recent Labs     08/25/21  0434 08/24/21  0550 08/23/21  0425   WBC 7.6 6.3 4.7   HGB 14.0 13.2* 13.3*   HCT 41.5 39.0* 40.2*    189 150       BMP:  Recent Labs     08/25/21  0434 08/24/21  0550 08/23/21  0425   CREA 0.65* 0.68* 0.78*   BUN 13 9 4*    137 138   K 3.5 3.2* 3.2*    107 107   CO2 23 23 27   AGAP 8 7 4*   GLU 91 95 108*       LFTS:  Recent Labs     08/24/21  0550   TBILI 0.9   *   *   TP 7.2   ALB 2.8*       Microbiology:     All Micro Results     Procedure Component Value Units Date/Time    SARS-COV-2, PCR [[id number]] Collected: 08/23/21 1757    Order Status: Completed Specimen: Nasopharyngeal Updated: 08/24/21 0935     Specimen source Nasopharyngeal        SARS-CoV-2 Not detected        Comment:      The specimen is NEGATIVE for SARS-CoV-2, the novel coronavirus associated with COVID-19. This test has been authorized by the FDA under an Emergency Use Authorization (EUA) for use by authorized laboratories.         Fact sheet for Healthcare Providers: ConventionUpdate.co.nz       Fact sheet for Patients: ConventionUpdate.co.nz       Methodology: RT-PCR         CULTURE, URINE [[id number]] Collected: 08/23/21 1056    Order Status: Completed Specimen: Urine from Clean catch Updated: 08/24/21 0917     Special Requests: NO SPECIAL REQUESTS        Culture result: NO GROWTH 1 DAY       BLOOD CULTURE [[id number]] Collected: 08/20/21 2000    Order Status: Completed Specimen: Blood Updated: 08/24/21 0656     Special Requests: --        LEFT  HAND       Culture result: NO GROWTH 4 DAYS       BLOOD CULTURE [[id number]]  (Abnormal) Collected: 08/20/21 1623    Order Status: Completed Specimen: Blood Updated: 08/23/21 1132     Special Requests: --        NO SPECIAL REQUESTS  LEFT  Antecubital       GRAM STAIN GRAM POSITIVE COCCI         ANAEROBIC BOTTLE POSITIVE               RESULTS VERIFIED, PHONED TO AND READ BACK BY Linda Oliver RN ON 8/21/22 @1610, AALIYAH           Culture result:       ANAEROBIC GRAM POSITIVE COCCI SENT TO [facility] FOR TESTING 8/23/21                  Refer to Blood Culture ID Panel Accession  [id number]      COVID-19 RAPID TEST [[id number]] Collected: 08/23/21 0935    Order Status: Completed Specimen: Nasopharyngeal Updated: 08/23/21 1015     Specimen source Nasopharyngeal        COVID-19 rapid test Not detected        Comment:      The specimen is NEGATIVE for SARS-CoV-2, the novel coronavirus associated with COVID-19. A negative result does not rule out COVID-19. This test has been authorized by the FDA under an Emergency Use Authorization (EUA) for use by authorized laboratories. Fact sheet for Healthcare Providers: BagFit.fi  Fact sheet for Patients: BagFit.fi       Methodology: Isothermal Nucleic Acid Amplification         BLOOD CULTURE ID PANEL [[id number]]  (Abnormal) Collected: 08/20/21 1623    Order Status: Completed Specimen: Blood Updated: 08/22/21 0659     Acc. no. from Micro Order [id number]     Streptococcus Detected        Comment: RESULTS VERIFIED, PHONED TO AND READ BACK BY  TARA Ruelas RN BY AALIYAH AT [phone]          INTERPRETATION       Gram positive cocci. Identified by realtime PCR as Streptococcus species (not agalactiae, pyogenes, or pneumoniae).            Comment: Consider discontinuation of IV vancomycin and using an anti-streptococcal beta-lactam (ceftriaxone/cefotaxime (peds))             Imaging:   See HPI    Signed By: Eli Willams NP     August 25, 2021

## 2021-09-11 NOTE — INTERDISCIPLINARY ROUNDS
Behavioral Health Interdisciplinary Rounds     Patient Name: Itzel Hylton  Age: 46 y.o. Room/Bed:  740/01  Primary Diagnosis: <principal problem not specified>   Admission Status: Involuntary Commitment     Readmission within 30 days: no  Power of  in place: no  Patient requires a blocked bed: no          Reason for blocked bed:     VTE Prophylaxis: Not indicated    Mobility needs/Fall risk: yes  Flu Vaccine : no and not flu season   Nutritional Plan: no  Consults:  podiatry        Labs/Testing due today?: yes    Sleep hours: 4       Participation in Care/Groups:  yes  Medication Compliant?: yes  PRNS (last 24 hours): Sleep Aid and Pain    Restraints (last 24 hours):  no     CIWA (range last 24 hours):     COWS (range last 24 hours):      Alcohol screening (AUDIT) completed -   AUDIT Score: 0     If applicable, date SBIRT discussed in treatment team AND documented:   AUDIT Screen Score: AUDIT Score: 0    Tobacco - patient is a smoker: Have You Used Tobacco in the Past 30 Days: Yes  Illegal Drugs use: Have You Used Any Illegal Substances Over the Past 12 Months: No    24 hour chart check complete: yes     Patient goal(s) for today:   Treatment team focus/goals: Plan for discharge on Thursday. Progress note : He was committed for treatment yesterday at his hearing. Denies suicidal ideations.       LOS:  2  Expected LOS: TBD    Financial concerns/prescription coverage:  Medicaid   Date of last family contact:       Family requesting physician contact today:    Discharge plan: he will return to his room for rent   Guns in the home:  no       Outpatient provider(s): TBD   Participating treatment team members: Itzel Hylton  [facility] - Trisha Maguire NP - Tip Romo RN Sendy Olson MD

## 2021-10-21 ENCOUNTER — TELEPHONE (OUTPATIENT)
Dept: FAMILY MEDICINE CLINIC | Age: 56
End: 2021-10-21

## 2021-10-21 NOTE — TELEPHONE ENCOUNTER
Home Based Primary Care [facility] Supportive Services   Phone:  [phone]      Fax:  [phone] [facility], [address] [place] [address], [facility]    Name:  Rajinder Arndt  YOB: 1965    Incoming call from patient's healthcare decision maker Alicia Forbes who is inquiring about [facility] for Rajinder Arndt. She states patient is homebound due to pyoderma gangrenosum and chronic leg ulcers. Patient lives at  [facility] which is 23.1 miles from [facility] office at [employer]. Our current radius is 10 miles from the [facility] office. This nurse gave Ms. Norris Brumfield the number for [address] [place] [address] [phone], patient may live within the U radius.         Natalia Gregg, CYNTHIAN, RN-BC, [facility]  Referral Navigator, Home Based Primary Care & Supportive Services

## 2022-10-16 ENCOUNTER — APPOINTMENT (OUTPATIENT)
Dept: CT IMAGING | Age: 57
DRG: 321 | End: 2022-10-16
Attending: STUDENT IN AN ORGANIZED HEALTH CARE EDUCATION/TRAINING PROGRAM
Payer: MEDICAID

## 2022-10-16 ENCOUNTER — APPOINTMENT (OUTPATIENT)
Dept: GENERAL RADIOLOGY | Age: 57
DRG: 321 | End: 2022-10-16
Attending: STUDENT IN AN ORGANIZED HEALTH CARE EDUCATION/TRAINING PROGRAM
Payer: MEDICAID

## 2022-10-16 ENCOUNTER — HOSPITAL ENCOUNTER (INPATIENT)
Age: 57
LOS: 34 days | Discharge: NURSING, REHAB OR LONG-TERM CARE | DRG: 321 | End: 2022-11-19
Attending: STUDENT IN AN ORGANIZED HEALTH CARE EDUCATION/TRAINING PROGRAM | Admitting: STUDENT IN AN ORGANIZED HEALTH CARE EDUCATION/TRAINING PROGRAM
Payer: MEDICAID

## 2022-10-16 DIAGNOSIS — N30.01 ACUTE CYSTITIS WITH HEMATURIA: Primary | ICD-10-CM

## 2022-10-16 DIAGNOSIS — R07.9 LEFT-SIDED CHEST PAIN: ICD-10-CM

## 2022-10-16 DIAGNOSIS — S22.059A CLOSED FRACTURE OF FIFTH THORACIC VERTEBRA, UNSPECIFIED FRACTURE MORPHOLOGY, INITIAL ENCOUNTER (HCC): ICD-10-CM

## 2022-10-16 PROBLEM — J44.1 COPD WITH ACUTE EXACERBATION (HCC): Status: ACTIVE | Noted: 2022-10-16

## 2022-10-16 LAB
ALBUMIN SERPL-MCNC: 3 G/DL (ref 3.5–5)
ALBUMIN/GLOB SERPL: 0.9 {RATIO} (ref 1.1–2.2)
ALP SERPL-CCNC: 154 U/L (ref 45–117)
ALT SERPL-CCNC: 16 U/L (ref 12–78)
ANION GAP SERPL CALC-SCNC: 8 MMOL/L (ref 5–15)
APPEARANCE UR: ABNORMAL
AST SERPL-CCNC: 10 U/L (ref 15–37)
BACTERIA URNS QL MICRO: ABNORMAL /HPF
BASE EXCESS BLD CALC-SCNC: 1.1 MMOL/L
BASOPHILS # BLD: 0.1 K/UL (ref 0–0.1)
BASOPHILS NFR BLD: 1 % (ref 0–1)
BILIRUB SERPL-MCNC: 0.3 MG/DL (ref 0.2–1)
BILIRUB UR QL: NEGATIVE
BNP SERPL-MCNC: 557 PG/ML
BUN SERPL-MCNC: 6 MG/DL (ref 6–20)
BUN/CREAT SERPL: 9 (ref 12–20)
CA-I BLD-MCNC: 1.19 MMOL/L (ref 1.12–1.32)
CALCIUM SERPL-MCNC: 8.3 MG/DL (ref 8.5–10.1)
CHLORIDE BLD-SCNC: 103 MMOL/L (ref 100–108)
CHLORIDE SERPL-SCNC: 104 MMOL/L (ref 97–108)
CO2 BLD-SCNC: 28 MMOL/L (ref 19–24)
CO2 SERPL-SCNC: 27 MMOL/L (ref 21–32)
COLOR UR: ABNORMAL
COVID-19 RAPID TEST, COVR: NOT DETECTED
CREAT SERPL-MCNC: 0.66 MG/DL (ref 0.7–1.3)
CREAT UR-MCNC: 0.5 MG/DL (ref 0.6–1.3)
DIFFERENTIAL METHOD BLD: ABNORMAL
EOSINOPHIL # BLD: 0.5 K/UL (ref 0–0.4)
EOSINOPHIL NFR BLD: 3 % (ref 0–7)
EPITH CASTS URNS QL MICRO: ABNORMAL /LPF
ERYTHROCYTE [DISTWIDTH] IN BLOOD BY AUTOMATED COUNT: 17.2 % (ref 11.5–14.5)
GLOBULIN SER CALC-MCNC: 3.2 G/DL (ref 2–4)
GLUCOSE BLD STRIP.AUTO-MCNC: 69 MG/DL (ref 74–106)
GLUCOSE SERPL-MCNC: 78 MG/DL (ref 65–100)
GLUCOSE UR STRIP.AUTO-MCNC: NEGATIVE MG/DL
HCO3 BLDA-SCNC: 28 MMOL/L
HCT VFR BLD AUTO: 31.9 % (ref 36.6–50.3)
HGB BLD-MCNC: 9.9 G/DL (ref 12.1–17)
HGB UR QL STRIP: ABNORMAL
IMM GRANULOCYTES # BLD AUTO: 0.3 K/UL (ref 0–0.04)
IMM GRANULOCYTES NFR BLD AUTO: 2 % (ref 0–0.5)
KETONES UR QL STRIP.AUTO: NEGATIVE MG/DL
LACTATE BLD-SCNC: 1.77 MMOL/L (ref 0.4–2)
LEUKOCYTE ESTERASE UR QL STRIP.AUTO: ABNORMAL
LYMPHOCYTES # BLD: 2.7 K/UL (ref 0.8–3.5)
LYMPHOCYTES NFR BLD: 16 % (ref 12–49)
MCH RBC QN AUTO: 25.1 PG (ref 26–34)
MCHC RBC AUTO-ENTMCNC: 31 G/DL (ref 30–36.5)
MCV RBC AUTO: 81 FL (ref 80–99)
MONOCYTES # BLD: 1.5 K/UL (ref 0–1)
MONOCYTES NFR BLD: 9 % (ref 5–13)
MUCOUS THREADS URNS QL MICRO: ABNORMAL /LPF
NEUTS SEG # BLD: 12 K/UL (ref 1.8–8)
NEUTS SEG NFR BLD: 69 % (ref 32–75)
NITRITE UR QL STRIP.AUTO: POSITIVE
NRBC # BLD: 0 K/UL (ref 0–0.01)
NRBC BLD-RTO: 0 PER 100 WBC
PCO2 BLDV: 52 MMHG (ref 41–51)
PH BLDV: 7.33 [PH] (ref 7.32–7.42)
PH UR STRIP: 6 [PH] (ref 5–8)
PLATELET # BLD AUTO: 354 K/UL (ref 150–400)
PMV BLD AUTO: 8.6 FL (ref 8.9–12.9)
PO2 BLDV: 46 MMHG (ref 25–40)
POTASSIUM BLD-SCNC: 3.5 MMOL/L (ref 3.5–5.5)
POTASSIUM SERPL-SCNC: 3.5 MMOL/L (ref 3.5–5.1)
PROT SERPL-MCNC: 6.2 G/DL (ref 6.4–8.2)
PROT UR STRIP-MCNC: ABNORMAL MG/DL
RBC # BLD AUTO: 3.94 M/UL (ref 4.1–5.7)
RBC #/AREA URNS HPF: ABNORMAL /HPF (ref 0–5)
SERVICE CMNT-IMP: 66
SODIUM BLD-SCNC: 142 MMOL/L (ref 136–145)
SODIUM SERPL-SCNC: 139 MMOL/L (ref 136–145)
SOURCE, COVRS: NORMAL
SP GR UR REFRACTOMETRY: 1.01
SPECIMEN SITE: ABNORMAL
TROPONIN-HIGH SENSITIVITY: 9 NG/L (ref 0–76)
UA: UC IF INDICATED,UAUC: ABNORMAL
UROBILINOGEN UR QL STRIP.AUTO: 0.2 EU/DL (ref 0.2–1)
WBC # BLD AUTO: 17 K/UL (ref 4.1–11.1)
WBC URNS QL MICRO: >100 /HPF (ref 0–4)

## 2022-10-16 PROCEDURE — 74011250636 HC RX REV CODE- 250/636: Performed by: STUDENT IN AN ORGANIZED HEALTH CARE EDUCATION/TRAINING PROGRAM

## 2022-10-16 PROCEDURE — 87077 CULTURE AEROBIC IDENTIFY: CPT

## 2022-10-16 PROCEDURE — 93005 ELECTROCARDIOGRAM TRACING: CPT

## 2022-10-16 PROCEDURE — 85025 COMPLETE CBC W/AUTO DIFF WBC: CPT

## 2022-10-16 PROCEDURE — 84484 ASSAY OF TROPONIN QUANT: CPT

## 2022-10-16 PROCEDURE — 87181 SC STD AGAR DILUTION PER AGT: CPT

## 2022-10-16 PROCEDURE — 74011250637 HC RX REV CODE- 250/637: Performed by: STUDENT IN AN ORGANIZED HEALTH CARE EDUCATION/TRAINING PROGRAM

## 2022-10-16 PROCEDURE — 74011000258 HC RX REV CODE- 258: Performed by: STUDENT IN AN ORGANIZED HEALTH CARE EDUCATION/TRAINING PROGRAM

## 2022-10-16 PROCEDURE — 94640 AIRWAY INHALATION TREATMENT: CPT

## 2022-10-16 PROCEDURE — 80053 COMPREHEN METABOLIC PANEL: CPT

## 2022-10-16 PROCEDURE — 96375 TX/PRO/DX INJ NEW DRUG ADDON: CPT

## 2022-10-16 PROCEDURE — 82947 ASSAY GLUCOSE BLOOD QUANT: CPT

## 2022-10-16 PROCEDURE — 71275 CT ANGIOGRAPHY CHEST: CPT

## 2022-10-16 PROCEDURE — 74011000250 HC RX REV CODE- 250: Performed by: STUDENT IN AN ORGANIZED HEALTH CARE EDUCATION/TRAINING PROGRAM

## 2022-10-16 PROCEDURE — 87086 URINE CULTURE/COLONY COUNT: CPT

## 2022-10-16 PROCEDURE — 74011000636 HC RX REV CODE- 636: Performed by: STUDENT IN AN ORGANIZED HEALTH CARE EDUCATION/TRAINING PROGRAM

## 2022-10-16 PROCEDURE — 36415 COLL VENOUS BLD VENIPUNCTURE: CPT

## 2022-10-16 PROCEDURE — 96365 THER/PROPH/DIAG IV INF INIT: CPT

## 2022-10-16 PROCEDURE — 65270000046 HC RM TELEMETRY

## 2022-10-16 PROCEDURE — 83880 ASSAY OF NATRIURETIC PEPTIDE: CPT

## 2022-10-16 PROCEDURE — 99285 EMERGENCY DEPT VISIT HI MDM: CPT

## 2022-10-16 PROCEDURE — 71045 X-RAY EXAM CHEST 1 VIEW: CPT

## 2022-10-16 PROCEDURE — 87040 BLOOD CULTURE FOR BACTERIA: CPT

## 2022-10-16 PROCEDURE — 87635 SARS-COV-2 COVID-19 AMP PRB: CPT

## 2022-10-16 PROCEDURE — 94761 N-INVAS EAR/PLS OXIMETRY MLT: CPT

## 2022-10-16 PROCEDURE — 87186 SC STD MICRODIL/AGAR DIL: CPT

## 2022-10-16 PROCEDURE — 81001 URINALYSIS AUTO W/SCOPE: CPT

## 2022-10-16 PROCEDURE — 74177 CT ABD & PELVIS W/CONTRAST: CPT

## 2022-10-16 RX ORDER — LIDOCAINE 4 G/100G
2 PATCH TOPICAL EVERY 24 HOURS
Status: DISCONTINUED | OUTPATIENT
Start: 2022-10-16 | End: 2022-11-20 | Stop reason: HOSPADM

## 2022-10-16 RX ORDER — KETOROLAC TROMETHAMINE 30 MG/ML
15 INJECTION, SOLUTION INTRAMUSCULAR; INTRAVENOUS
Status: COMPLETED | OUTPATIENT
Start: 2022-10-16 | End: 2022-10-16

## 2022-10-16 RX ORDER — OXYCODONE HYDROCHLORIDE 5 MG/1
5 TABLET ORAL
Status: DISCONTINUED | OUTPATIENT
Start: 2022-10-16 | End: 2022-10-17

## 2022-10-16 RX ORDER — LANOLIN ALCOHOL/MO/W.PET/CERES
3 CREAM (GRAM) TOPICAL
Status: DISCONTINUED | OUTPATIENT
Start: 2022-10-16 | End: 2022-11-20 | Stop reason: HOSPADM

## 2022-10-16 RX ORDER — METHADONE HYDROCHLORIDE 10 MG/ML
60 CONCENTRATE ORAL DAILY
Status: DISCONTINUED | OUTPATIENT
Start: 2022-10-17 | End: 2022-11-16

## 2022-10-16 RX ORDER — IPRATROPIUM BROMIDE AND ALBUTEROL SULFATE 2.5; .5 MG/3ML; MG/3ML
3 SOLUTION RESPIRATORY (INHALATION)
Status: DISPENSED | OUTPATIENT
Start: 2022-10-16 | End: 2022-10-17

## 2022-10-16 RX ORDER — TAMSULOSIN HYDROCHLORIDE 0.4 MG/1
0.4 CAPSULE ORAL DAILY
Status: DISCONTINUED | OUTPATIENT
Start: 2022-10-17 | End: 2022-11-20 | Stop reason: HOSPADM

## 2022-10-16 RX ORDER — ACETAMINOPHEN 650 MG/1
650 SUPPOSITORY RECTAL
Status: DISCONTINUED | OUTPATIENT
Start: 2022-10-16 | End: 2022-10-29

## 2022-10-16 RX ORDER — LEVOTHYROXINE SODIUM 125 UG/1
125 TABLET ORAL
Status: DISCONTINUED | OUTPATIENT
Start: 2022-10-17 | End: 2022-11-20 | Stop reason: HOSPADM

## 2022-10-16 RX ORDER — ONDANSETRON 2 MG/ML
4 INJECTION INTRAMUSCULAR; INTRAVENOUS
Status: DISCONTINUED | OUTPATIENT
Start: 2022-10-16 | End: 2022-11-20 | Stop reason: HOSPADM

## 2022-10-16 RX ORDER — AMLODIPINE BESYLATE 5 MG/1
5 TABLET ORAL DAILY
Status: ON HOLD | COMMUNITY
End: 2022-10-21

## 2022-10-16 RX ORDER — GUAIFENESIN 100 MG/5ML
81 LIQUID (ML) ORAL DAILY
Status: DISCONTINUED | OUTPATIENT
Start: 2022-10-17 | End: 2022-10-21

## 2022-10-16 RX ORDER — GABAPENTIN 100 MG/1
100 CAPSULE ORAL 3 TIMES DAILY
Status: DISCONTINUED | OUTPATIENT
Start: 2022-10-16 | End: 2022-11-12

## 2022-10-16 RX ORDER — SODIUM CHLORIDE 0.9 % (FLUSH) 0.9 %
5-40 SYRINGE (ML) INJECTION AS NEEDED
Status: DISCONTINUED | OUTPATIENT
Start: 2022-10-16 | End: 2022-11-20 | Stop reason: HOSPADM

## 2022-10-16 RX ORDER — AMLODIPINE BESYLATE 5 MG/1
5 TABLET ORAL DAILY
Status: DISCONTINUED | OUTPATIENT
Start: 2022-10-17 | End: 2022-10-21

## 2022-10-16 RX ORDER — MIDAZOLAM HYDROCHLORIDE 1 MG/ML
2 INJECTION, SOLUTION INTRAMUSCULAR; INTRAVENOUS
Status: DISCONTINUED | OUTPATIENT
Start: 2022-10-16 | End: 2022-11-03

## 2022-10-16 RX ORDER — ACETAMINOPHEN 325 MG/1
650 TABLET ORAL
Status: DISCONTINUED | OUTPATIENT
Start: 2022-10-16 | End: 2022-10-29

## 2022-10-16 RX ORDER — ONDANSETRON 4 MG/1
4 TABLET, ORALLY DISINTEGRATING ORAL
Status: DISCONTINUED | OUTPATIENT
Start: 2022-10-16 | End: 2022-11-20 | Stop reason: HOSPADM

## 2022-10-16 RX ORDER — POLYETHYLENE GLYCOL 3350 17 G/17G
17 POWDER, FOR SOLUTION ORAL DAILY PRN
Status: DISCONTINUED | OUTPATIENT
Start: 2022-10-16 | End: 2022-10-30

## 2022-10-16 RX ORDER — OXYCODONE HYDROCHLORIDE 5 MG/1
5 TABLET ORAL
Status: COMPLETED | OUTPATIENT
Start: 2022-10-16 | End: 2022-10-16

## 2022-10-16 RX ORDER — HYDROMORPHONE HYDROCHLORIDE 1 MG/ML
0.5 INJECTION, SOLUTION INTRAMUSCULAR; INTRAVENOUS; SUBCUTANEOUS ONCE
Status: COMPLETED | OUTPATIENT
Start: 2022-10-16 | End: 2022-10-16

## 2022-10-16 RX ORDER — SODIUM CHLORIDE 0.9 % (FLUSH) 0.9 %
5-40 SYRINGE (ML) INJECTION EVERY 8 HOURS
Status: DISCONTINUED | OUTPATIENT
Start: 2022-10-16 | End: 2022-11-16

## 2022-10-16 RX ORDER — IPRATROPIUM BROMIDE AND ALBUTEROL SULFATE 2.5; .5 MG/3ML; MG/3ML
3 SOLUTION RESPIRATORY (INHALATION)
Status: DISCONTINUED | OUTPATIENT
Start: 2022-10-16 | End: 2022-11-20 | Stop reason: HOSPADM

## 2022-10-16 RX ORDER — PREDNISONE 20 MG/1
40 TABLET ORAL
Status: COMPLETED | OUTPATIENT
Start: 2022-10-17 | End: 2022-10-21

## 2022-10-16 RX ORDER — ENOXAPARIN SODIUM 100 MG/ML
40 INJECTION SUBCUTANEOUS DAILY
Status: DISCONTINUED | OUTPATIENT
Start: 2022-10-17 | End: 2022-10-17

## 2022-10-16 RX ORDER — ALLOPURINOL 100 MG/1
100 TABLET ORAL DAILY
Status: DISCONTINUED | OUTPATIENT
Start: 2022-10-17 | End: 2022-10-21

## 2022-10-16 RX ORDER — AMOXICILLIN 250 MG
1 CAPSULE ORAL DAILY
Status: DISCONTINUED | OUTPATIENT
Start: 2022-10-17 | End: 2022-10-21

## 2022-10-16 RX ADMIN — KETOROLAC TROMETHAMINE 15 MG: 30 INJECTION, SOLUTION INTRAMUSCULAR at 21:24

## 2022-10-16 RX ADMIN — GABAPENTIN 100 MG: 300 CAPSULE ORAL at 21:24

## 2022-10-16 RX ADMIN — HYDROMORPHONE HYDROCHLORIDE 0.5 MG: 1 INJECTION, SOLUTION INTRAMUSCULAR; INTRAVENOUS; SUBCUTANEOUS at 18:48

## 2022-10-16 RX ADMIN — CEFTRIAXONE 1 G: 1 INJECTION, POWDER, FOR SOLUTION INTRAMUSCULAR; INTRAVENOUS at 17:12

## 2022-10-16 RX ADMIN — IOPAMIDOL 100 ML: 755 INJECTION, SOLUTION INTRAVENOUS at 21:54

## 2022-10-16 RX ADMIN — MIDAZOLAM 2 MG: 1 INJECTION INTRAMUSCULAR; INTRAVENOUS at 21:25

## 2022-10-16 RX ADMIN — SODIUM CHLORIDE, PRESERVATIVE FREE 10 ML: 5 INJECTION INTRAVENOUS at 21:25

## 2022-10-16 RX ADMIN — OXYCODONE 5 MG: 5 TABLET ORAL at 15:42

## 2022-10-16 NOTE — H&P
This note is compiled to communicate with the medical care team. It may contain sensitive and protected information. It is not intended to serve as a source of communication with patients/families/friends; that communication occurs at the bedside or via alternative direct communications means (secure messaging, letters, video/telephone, etc.). Hospitalist Admission Note    NAME: Elzbieta Wakefield   :  1965   MRN:  [id number]     Date/Time:  10/17/2022 1:18 AM    Patient PCP: None  ______________________________________________________________________  Given the patient's current clinical presentation, I have a high level of concern for decompensation if discharged from the emergency department. Complex decision making was performed, which includes reviewing the patient's available past medical records, laboratory results, and x-ray films. My assessment of this patient's clinical condition and my plan of care is as follows. Subjective:   CHIEF COMPLAINT: Back pain    HISTORY OF PRESENT ILLNESS:     Elzbieta Wakefield is a 61 y.o.   male with past medical history as listed below who presents with complaints of severe back pain reportedly under shoulder blades reportedly 10/10 pain that is constant/aching as well as mild worsening of baseline shortness of breath secondary to underlying COPD. Patient reports all symptoms began today. He reports no inciting event. Of note, patient had recent prolonged hospitalization at [facility] for left leg wound secondary to pressure injury from immobility due to left-sided deficits from CVA ultimately requiring above-knee amputation and prolonged hospitalization for antibiotics due to sepsis. He reports he was discharged yesterday. Patient also noted to have chronic wounds and skin tears on extremities, which he reports are followed by wound care and has been healing appropriately without concerns for infection. ROS otherwise negative.   Patient reports his Chantix prescription ran out and on discharge he resumed smoking estimating he went through about 1/2 pack yesterday he denies alcohol or illicit drug use. In the ED, patient afebrile tachycardic to 100s, hypertensive 140s/90s saturating mid to upper 90s on room air visibly uncomfortable shifting around frequently in bed. ECG demonstrates sinus tachycardia (rate 103) without definitive ischemic change, rapid COVID-negative, labs demonstrate: Lactic acid 1.77, WBC 17.0, hemoglobin 9.9, platelets 181,-BGLIQFSE 9, proBNP 557, sodium 139, potassium 3.5, BUN 6, creatinine 0.66, UA reflex to culture (cloudy, trace protein, small blood, nitrite positive, large leuk esterase, greater than 100 WBCs, 3+ bacteria). CXR demonstrates mild bibasilar atelectasis. CTA ordered by ED provider, patient reports he is claustrophobic and had anxiety attack in the CT scanner and was unable to complete examination. I discussed importance of full evaluation given unexplained source of pain. Offered medical sedation, which patient accepted and was able to complete CTA chest and CT abdomen pelvis as we would likely only have 1 go at scanning. These demonstrated 2.3 mm right lung nodule, no PE, right subclavian vein stenosis with venous collateralization, increased size and intermediate density left renal lesion, left femoral head avascular necrosis, and age-indeterminate inferior endplate compression fracture of T12 vertebral body    We were asked to admit for work up and evaluation of the above problems.      Past Medical History:   Diagnosis Date    Aneurysm ([facility] [address]     (with stroke) brain    Bladder tumor     Cancer ([facility] [address]     bladder CA    Chronic pain     Family history of bladder cancer     GERD (gastroesophageal reflux disease)     Gout     History of vascular access device 04/25/2019    [facility] VAT 4 FR MIDLINE R Cephalic Limited access    History of vascular access device 04/26/2019    4 fr Midline right Cephalic midline access Hypercholesterolemia     Hypertension     Lesion of bladder     Seizures ([facility] [address]     Stroke [facility]) 2006    left sided weakness    Thromboembolus (HCC)     left leg    Thyroid disease     TIA (transient ischemic attack) 2012        Past Surgical History:   Procedure Laterality Date    HX ORTHOPAEDIC      R arthroscopy    HX OTHER SURGICAL      x2 L foot    HX UROLOGICAL  04/20/2018    Cystoscopy, TURBT (greater than 5 cm resected) Dr. Ann Marie Larry, [facility]. KNEE ARTHROSCP HARV      left knee    TRANSURETHRAL RESEC BLADDER NECK  08/10/2018       Social History     Tobacco Use    Smoking status: Every Day     Packs/day: 0.50     Types: Cigarettes    Smokeless tobacco: Never    Tobacco comments:     instructed not to smoke 24 hrs prior surgery   Substance Use Topics    Alcohol use: Yes     Alcohol/week: 6.0 standard drinks     Types: 6 Cans of beer per week     Comment: occasional        Family History   Problem Relation Age of Onset    Diabetes Father     Lung Disease Father     Diabetes Sister     Diabetes Brother     Cancer Paternal Grandfather         Bladder       Allergies   Allergen Reactions    Sulfamethoxazole-Trimethoprim Rash     L eye and L hand    Ciprofloxacin Hives     Per pcp records    Codeine Hives     Tolerates dilaudid, oxycodone    Cymbalta [Duloxetine] Other (comments)     Confusion and memory loss    Lyrica [Pregabalin] Hives    Sulfa (Sulfonamide Antibiotics) Rash    Ultram [Tramadol] Hives    Vancomycin Shortness of Breath    Zosyn [Piperacillin-Tazobactam] Rash        Prior to Admission medications    Medication Sig Start Date End Date Taking? Authorizing Provider   amLODIPine (NORVASC) 5 mg tablet Take 5 mg by mouth daily. Yes Provider, Historical   tamsulosin (FLOMAX) 0.4 mg capsule Take 1 capsule by mouth daily after dinner 3/11/22  Yes Sania Shine MD   collagenase (SANTYL) 250 unit/gram ointment Apply  to affected area daily.  Indications: a skin ulcer 7/16/21  Yes Lor Acevedo MD levothyroxine (SYNTHROID) 125 mcg tablet Take 125 mcg by mouth Daily (before breakfast). Yes Provider, Historical   ibuprofen (MOTRIN) 200 mg tablet Take 400 mg by mouth every six (6) hours as needed for Pain. Yes Provider, Historical   lidocaine (XYLOCAINE) 4 % topical cream Apply  to affected area as needed for Pain (apply to left lower leg wounds during dressing changes). 12/22/20  Yes Faraz Kent MD   gabapentin (NEURONTIN) 100 mg capsule Take 100 mg by mouth three (3) times daily. Yes Provider, Historical   aspirin 81 mg chewable tablet Take 81 mg by mouth daily. Yes Provider, Historical   allopurinol (ZYLOPRIM) 100 mg tablet Take 1 Tab by mouth daily. Indications: treatment to prevent acute gout attack 5/24/19  Yes Trisha Maguire NP   methadone (DOLOPHINE) 10 mg/mL solution Take 60 mg by mouth daily. Indications: excessive pain   Yes Provider, Historical   melatonin 3 mg tablet Take 1 Tab by mouth nightly as needed. 10/12/18  Yes Jonatan Talavera MD   colchicine 0.6 mg tablet Take 0.6 mg by mouth daily as needed for Gout or Pain. Indications: acute inflammation of the joints due to gout attack    Provider, Historical   senna-docusate (PERICOLACE) 8.6-50 mg per tablet Take 1 Tablet by mouth daily.  Indications: constipation    Provider, Historical       REVIEW OF SYSTEMS:       Total of 12 systems reviewed as follows:       POSITIVE= Red text   General: Fever, chills, sweats, weakness/malaise, weight loss/gain, loss of appetite    Eyes:   Vision change, eye pain   ENT:    Rhinorrhea, pharyngitis, Hearing loss    Respiratory:   cough, sputum production, SOB, BECK, wheezing, pleuritic pain    Cardiology:   chest pain, palpitations, orthopnea, edema, syncope    Gastrointestinal:  abdominal pain , N/V, diarrhea, dysphagia, constipation, bleeding    Genitourinary:  frequency, urgency, dysuria, hematuria, incontinence    Muskuloskeletal:  arthralgia, myalgia, back pain   Hematology:  easy bruising, nose or gum bleeding, lymphadenopathy    Dermatological: rash, ulceration, pruritis, color change / jaundice   Endocrine:  hot flashes or polydipsia    Neurological:  headache, dizziness, confusion, focal weakness, paresthesia, Speech difficulties, memory loss, gait difficulty   Psychological: Feelings of anxiety, depression, agitation       Objective:   VITALS:    Visit Vitals  BP (!) 142/92   Pulse (!) 101   Temp 98.3 °F (36.8 °C)   Resp 12   Ht 6' (1.829 m)   Wt 103 kg (227 lb)   SpO2 100%   BMI 30.79 kg/m²       PHYSICAL EXAM:    General:   Alert, cooperative, uncomfortable appearing, chronically ill-appearing middle-aged male     HEENT:  Atraumatic, anicteric sclerae, pink conjunctivae, mucosa moist, dentition fair     Neck:  Supple, symmetrical, trachea midline, no abnormalities on palpation     Lungs:   Scattered wheezes bilaterally with rhonchorous cough. Symmetric expansion. Good aeration. Normal respiratory effort. Chest wall:  Tenderness to palpation of thoracic or lumbar spine. No Accessory muscle use. Cardiovascular:   RRR, No murmur/rub/gallop. No JVD. Radial/AT/DP pulse 2+     GI/:   Obese/protuberant, soft, non-tender. Not distended. Bowel sounds normal. No HSM, site of prior G-tube well-appearing and covered in clean dressing     Extremities No edema. No cyanosis. Capillary refill normal, scattered skin tears and wounds well-healing without evidence of infection, status post left AKA incision site well-healing     Skin: No significant lesions noted. Not Jaundiced. No rashes      Neurologic: PERRL. EOMI. No facial asymmetry. No aphasia or slurred speech. Moves all extremities. Sensation to light touch grossly intact BUE/BLE. No overt focal deficits appreciated, baseline left-sided weakness noted     Psych:  Alert and oriented X 4. Normal affect.  Good insight     Other:   N/A         LAB DATA REVIEWED:    Recent Results (from the past 24 hour(s))   EKG, 12 LEAD, INITIAL    Collection Time: 10/16/22  1:25 PM   Result Value Ref Range    Ventricular Rate 103 BPM    Atrial Rate 103 BPM    P-R Interval 152 ms    QRS Duration 86 ms    Q-T Interval 348 ms    QTC Calculation (Bezet) 455 ms    Calculated P Axis 40 degrees    Calculated R Axis 19 degrees    Calculated T Axis 44 degrees    Diagnosis       Sinus tachycardia  Septal infarct , age undetermined  When compared with ECG of 25-JUN-2021 14:21,  Septal infarct is now present  Nonspecific T wave abnormality no longer evident in Inferior leads  T wave amplitude has increased in Anterior leads     COVID-19 RAPID TEST    Collection Time: 10/16/22  1:35 PM   Result Value Ref Range    Specimen source Nasopharyngeal      COVID-19 rapid test Not detected NOTD     CBC WITH AUTOMATED DIFF    Collection Time: 10/16/22  1:44 PM   Result Value Ref Range    WBC 17.0 (H) 4.1 - 11.1 K/uL    RBC 3.94 (L) 4.10 - 5.70 M/uL    HGB 9.9 (L) 12.1 - 17.0 g/dL    HCT 31.9 (L) 36.6 - 50.3 %    MCV 81.0 80.0 - 99.0 FL    MCH 25.1 (L) 26.0 - 34.0 PG    MCHC 31.0 30.0 - 36.5 g/dL    RDW 17.2 (H) 11.5 - 14.5 %    PLATELET 200 000 - 910 K/uL    MPV 8.6 (L) 8.9 - 12.9 FL    NRBC 0.0 0  WBC    ABSOLUTE NRBC 0.00 0.00 - 0.01 K/uL    NEUTROPHILS 69 32 - 75 %    LYMPHOCYTES 16 12 - 49 %    MONOCYTES 9 5 - 13 %    EOSINOPHILS 3 0 - 7 %    BASOPHILS 1 0 - 1 %    IMMATURE GRANULOCYTES 2 (H) 0.0 - 0.5 %    ABS. NEUTROPHILS 12.0 (H) 1.8 - 8.0 K/UL    ABS. LYMPHOCYTES 2.7 0.8 - 3.5 K/UL    ABS. MONOCYTES 1.5 (H) 0.0 - 1.0 K/UL    ABS. EOSINOPHILS 0.5 (H) 0.0 - 0.4 K/UL    ABS. BASOPHILS 0.1 0.0 - 0.1 K/UL    ABS. IMM.  GRANS. 0.3 (H) 0.00 - 0.04 K/UL    DF AUTOMATED     METABOLIC PANEL, COMPREHENSIVE    Collection Time: 10/16/22  1:44 PM   Result Value Ref Range    Sodium 139 136 - 145 mmol/L    Potassium 3.5 3.5 - 5.1 mmol/L    Chloride 104 97 - 108 mmol/L    CO2 27 21 - 32 mmol/L    Anion gap 8 5 - 15 mmol/L    Glucose 78 65 - 100 mg/dL    BUN 6 6 - 20 MG/DL    Creatinine 0.66 (L) 0.70 - 1.30 MG/DL    BUN/Creatinine ratio 9 (L) 12 - 20      eGFR >60 >60 ml/min/1.73m2    Calcium 8.3 (L) 8.5 - 10.1 MG/DL    Bilirubin, total 0.3 0.2 - 1.0 MG/DL    ALT (SGPT) 16 12 - 78 U/L    AST (SGOT) 10 (L) 15 - 37 U/L    Alk.  phosphatase 154 (H) 45 - 117 U/L    Protein, total 6.2 (L) 6.4 - 8.2 g/dL    Albumin 3.0 (L) 3.5 - 5.0 g/dL    Globulin 3.2 2.0 - 4.0 g/dL    A-G Ratio 0.9 (L) 1.1 - 2.2     TROPONIN-HIGH SENSITIVITY    Collection Time: 10/16/22  1:44 PM   Result Value Ref Range    Troponin-High Sensitivity 9 0 - 76 ng/L   NT-PRO BNP    Collection Time: 10/16/22  1:44 PM   Result Value Ref Range    NT pro- (H) <125 PG/ML   BLOOD GAS,CHEM8,LACTIC ACID POC    Collection Time: 10/16/22  1:45 PM   Result Value Ref Range    Calcium, ionized (POC) 1.19 1.12 - 1.32 mmol/L    BICARBONATE 28 mmol/L    Base excess (POC) 1.1 mmol/L    Sample source VENOUS BLOOD      CO2, POC 28 (H) 19 - 24 MMOL/L    Sodium,  136 - 145 MMOL/L    Potassium, POC 3.5 3.5 - 5.5 MMOL/L    Chloride,  100 - 108 MMOL/L    Glucose, POC 69 (L) 74 - 106 MG/DL    Creatinine, POC 0.5 (L) 0.6 - 1.3 MG/DL    Lactic Acid (POC) 1.77 0.40 - 2.00 mmol/L    Critical value read back 66     pH, venous (POC) 7.33 7.32 - 7.42      pCO2, venous (POC) 52.0 (H) 41 - 51 MMHG    pO2, venous (POC) 46 (H) 25 - 40 mmHg   URINALYSIS W/ REFLEX CULTURE    Collection Time: 10/16/22  3:37 PM    Specimen: Urine   Result Value Ref Range    Color YELLOW/STRAW      Appearance CLOUDY (A) CLEAR      Specific gravity 1.010      pH (UA) 6.0 5.0 - 8.0      Protein TRACE (A) NEG mg/dL    Glucose Negative NEG mg/dL    Ketone Negative NEG mg/dL    Bilirubin Negative NEG      Blood SMALL (A) NEG      Urobilinogen 0.2 0.2 - 1.0 EU/dL    Nitrites Positive (A) NEG      Leukocyte Esterase LARGE (A) NEG      WBC >100 (H) 0 - 4 /hpf    RBC 5-10 0 - 5 /hpf    Epithelial cells FEW FEW /lpf    Bacteria 3+ (A) NEG /hpf    UA:UC IF INDICATED URINE CULTURE ORDERED (A) CNI Mucus TRACE (A) NEG /lpf       IMAGING:  CXR-mild bibasilar atelectasis    CTA chest + CT abdomen pelvis:  1. No evidence for acute pulmonary embolism. Slightly limited evaluation of the  distal segmental lower lobe pulmonary arteries due to motion artifact. 2. 3 mm right lung nodule. 3. Right subclavian vein stenosis with venous collateralization. 4. Increase in size of intermediate density left renal lesion. Recommend  follow-up with ultrasound in 6 months. 5. Left femoral head avascular necrosis. 6. Age indeterminate inferior endplate compression fracture of the T12 vertebral  body.       EKG: Sinus tachycardia without definitive ischemic change, rate 103, , QTc 455  ______________________________________________________________________    Assessment / Plan:  Severe back pain-likely secondary to T12 inferior vertebral endplate compression fracture  Chronic pain on methadone  Debility secondary to CVA and recent left AKA  Chronic wounds  UTI with recent Gross  Mild COPD exacerbation  History of bladder cancer  History of gout  GERD  Essential hypertension  Hypothyroidism    PLAN:    Severe back pain-likely secondary to T12 inferior vertebral endplate compression fracture  Chronic pain on methadone  Debility secondary to CVA and recent left AKA  Admit to telemetry monitoring  Continue PTA methadone  Oxycodone 5 mg every 4 hours as needed breakthrough pain  Trial lidocaine patches  PT/OT consulted    Chronic wounds  Wound care consulted, greatly appreciate their expertise-no evidence of cellulitic lesions on exam    UTI with recent Gross  History of bladder cancer  Ceftriaxone daily x5 days  Continue PTA Flomax    Mild COPD exacerbation  Schedule DuoNebs every 6 hours x24 hours with every 4 hours as needed breakthrough nebs  Prednisone 40 mg daily for 5 days  Hold off on azithromycin at present given mild symptoms    History of gout  No evidence of active flare  Continue PTA allopurinol    GERD  Protonix daily    Essential hypertension  Continue PTA amlodipine, aspirin    Hypothyroidism  Continue PTA Synthroid    Incidental findings requiring outpatient follow up/ evaluation:  3 mm right lung nodule. -Guidelines recommend repeat scan in 12 months  Increase in size of intermediate density left renal lesion. Recommend  follow-up with ultrasound in 6 months. Code Status: Full    DVT Prophylaxis: Lovenox  GI Prophylaxis: Protonix  Diet: Diabetic/cardiac       Care Plan discussed with:    Comments   Patient x    Family      RN     Care Manager                    Consultant:      _______________________________________________________________________  Baseline Level of Function: Debilitated    Expected  Disposition:   Home with Family    HH/PT/OT/RN    SNF/LTC x   LORAINE    ________________________________________________________________________  TOTAL TIME:  76 Minutes      Comments    x Reviewed previous records   >50% of visit spent in counseling and coordination of care x Discussion with patient and/or family and questions answered       ________________________________________________________________________  Signed: Madan Salas DO  10/17/2022 1:18 AM    Please note that this documentation was completed in part with Dragon dictation software. Occasionally unanticipated grammatical, syntax, homophones, and other interpretive errors are inadvertently transcribed by the computer software. Please excuse and disregard any errors that have escaped final proofreading. If in doubt, please do not hesitate to reach out to myself or the assigned hospitalist via [facility] system for clarification.

## 2022-10-16 NOTE — REMOTE MONITORING
Attempted to contact primary RN regarding this patient. Left message with Charge RN regarding 3 hour Sepsis bundle.      Bridget Frausto 20  Callback # [phone]

## 2022-10-16 NOTE — REMOTE MONITORING
Attempted to contact primary RN in regards to the sepsis bundle.      Bridget Lemons 20  Callback # [phone]

## 2022-10-16 NOTE — ED PROVIDER NOTES
EMERGENCY DEPARTMENT HISTORY AND PHYSICAL EXAM      Date: 10/16/2022  Patient Name: Cheng Soliz    History of Presenting Illness     Chief Complaint   Patient presents with    Shortness of Breath     SOB worse since yesterday, denies fever or cough. \"Crackling\" heard when talking and BECK. Pain at right chest to posterior right should blade. Recent AKA (d/c yesterday from [facility]), hx of COPD         HPI: Cheng Soliz, 61 y.o. male presents to the ED with cc of shortness of breath and chest pain. This started yesterday. He describes it as sharp pain on the left side of his chest that radiates under his left shoulder blade. It is constant, gets worse when he says he gets excited. He reports associated shortness of breath. Denies any cough or fevers. No vomiting diarrhea or abdominal pain. He is not on oxygen at home. He denies any worsening swelling in his right leg, he had left above-knee amputation he states about 5 weeks ago. He has some chronic wounds and skin tears of the extremities, these are checked on every day, and he says that they have been healing appropriately, no new pain or swelling from the wounds. There are no other complaints, changes, or physical findings at this time. PCP: None    No current facility-administered medications on file prior to encounter. Current Outpatient Medications on File Prior to Encounter   Medication Sig Dispense Refill    tamsulosin (FLOMAX) 0.4 mg capsule Take 1 capsule by mouth daily after dinner 30 Capsule 3    collagenase (SANTYL) 250 unit/gram ointment Apply  to affected area daily. Indications: a skin ulcer 15 g 0    levothyroxine (SYNTHROID) 125 mcg tablet Take 125 mcg by mouth Daily (before breakfast). mycophenolate (CELLCEPT) 500 mg tablet Take 500 mg by mouth two (2) times a day. ibuprofen (MOTRIN) 200 mg tablet Take 400 mg by mouth every six (6) hours as needed for Pain.       colchicine 0.6 mg tablet Take 0.6 mg by mouth daily as needed for Gout or Pain. Indications: acute inflammation of the joints due to gout attack      varenicline (Chantix Starting Month Box) 0.5 mg (11)- 1 mg (42) DsPk Take  by mouth. Take one tablet by mouth in morning with food for 3 days then increase to 1 tablet by mouth twice daily with food thereafter as directed   Indications: stop smoking      lidocaine (XYLOCAINE) 4 % topical cream Apply  to affected area as needed for Pain (apply to left lower leg wounds during dressing changes). 5 Tube 1    gabapentin (NEURONTIN) 100 mg capsule Take 100 mg by mouth three (3) times daily. lisinopriL (PRINIVIL, ZESTRIL) 20 mg tablet Take 20 mg by mouth daily. aspirin 81 mg chewable tablet Take 81 mg by mouth daily. pantoprazole (PROTONIX) 40 mg tablet Take 40 mg by mouth daily as needed. allopurinol (ZYLOPRIM) 100 mg tablet Take 1 Tab by mouth daily. Indications: treatment to prevent acute gout attack 30 Tab 0    senna-docusate (DARELL-COLACE) 8.6-50 mg per tablet Take 1 Tablet by mouth daily. Indications: constipation      methadone (DOLOPHINE) 10 mg/mL solution Take 140 mg by mouth daily. Indications: excessive pain      melatonin 3 mg tablet Take 1 Tab by mouth nightly as needed.  10 Tab 0       Past History     Past Medical History:  Past Medical History:   Diagnosis Date    Aneurysm ([address]     (with stroke) brain    Bladder tumor     Cancer ([address]     bladder CA    Chronic pain     Family history of bladder cancer     GERD (gastroesophageal reflux disease)     Gout     History of vascular access device 04/25/2019    [facility] VAT 4 FR MIDLINE R Cephalic Limited access    History of vascular access device 04/26/2019    4 fr Midline right Cephalic midline access    Hypercholesterolemia     Hypertension     Lesion of bladder     Seizures ([address]     Stroke ([address] 2006    left sided weakness    Thromboembolus ([address]     left leg    Thyroid disease     TIA (transient ischemic attack) 2012       Past Surgical History:  Past Surgical History:   Procedure Laterality Date    HX ORTHOPAEDIC      R arthroscopy    HX OTHER SURGICAL      x2 L foot    HX UROLOGICAL  04/20/2018    Cystoscopy, TURBT (greater than 5 cm resected) Dr. Dioni Ching, [facility]. KNEE ARTHROSCP HARV      left knee    TRANSURETHRAL RESEC BLADDER NECK  08/10/2018       Family History:  Family History   Problem Relation Age of Onset    Diabetes Father     Lung Disease Father     Diabetes Sister     Diabetes Brother     Cancer Paternal Grandfather         Bladder       Social History:  Social History     Tobacco Use    Smoking status: Every Day     Packs/day: 0.50     Types: Cigarettes    Smokeless tobacco: Never    Tobacco comments:     instructed not to smoke 24 hrs prior surgery   Substance Use Topics    Alcohol use: Yes     Alcohol/week: 6.0 standard drinks     Types: 6 Cans of beer per week     Comment: occasional    Drug use: Yes     Types: Prescription       Allergies: Allergies   Allergen Reactions    Sulfamethoxazole-Trimethoprim Rash     L eye and L hand    Ciprofloxacin Hives     Per pcp records    Codeine Hives     Tolerates dilaudid, oxycodone    Cymbalta [Duloxetine] Other (comments)     Confusion and memory loss    Lyrica [Pregabalin] Hives    Sulfa (Sulfonamide Antibiotics) Rash    Ultram [Tramadol] Hives    Vancomycin Shortness of Breath    Zosyn [Piperacillin-Tazobactam] Rash         Review of Systems   no fever  No ear pain  No eye pain  Reports shortness of breath  Reports chest pain  no abdominal pain  no dysuria  No new leg pain  Stable wounds  No lymphadenopathy  No weight loss    Physical Exam   Physical Exam  Constitutional:       General: He is not in acute distress. Appearance: He is not toxic-appearing. HENT:      Head: Normocephalic and atraumatic. Eyes:      Extraocular Movements: Extraocular movements intact. Cardiovascular:      Rate and Rhythm: Regular rhythm. Tachycardia present.    Pulmonary:      Comments: Coarse breath sounds with inspiration at bilateral bases, no wheeze, no significant accessory muscle use, he is able to speak in full sentences  Chest:      Comments: Tender to palpation over left lateral chest wall without overlying skin changes, crepitus or step-offs   Abdominal:      Palpations: Abdomen is soft. Tenderness: There is no abdominal tenderness. Musculoskeletal:         General: No deformity. Cervical back: Neck supple. Comments: There is left above-knee amputation, well-healed surgical incision without erythema, induration or warmth. Moist based skin tear over right lower extremity, no significant surrounding erythema, no purulence, no tenderness. Stable skin tear over left upper extremity without purulence or significant tenderness. Scattered bruising over extremities. 1+ pitting edema of right lower extremity. Skin:     General: Skin is warm and dry. Neurological:      General: No focal deficit present. Mental Status: He is alert and oriented to person, place, and time.    Psychiatric:         Mood and Affect: Mood normal.       Diagnostic Study Results     Labs -     Recent Results (from the past 24 hour(s))   EKG, 12 LEAD, INITIAL    Collection Time: 10/16/22  1:25 PM   Result Value Ref Range    Ventricular Rate 103 BPM    Atrial Rate 103 BPM    P-R Interval 152 ms    QRS Duration 86 ms    Q-T Interval 348 ms    QTC Calculation (Bezet) 455 ms    Calculated P Axis 40 degrees    Calculated R Axis 19 degrees    Calculated T Axis 44 degrees    Diagnosis       Sinus tachycardia  Septal infarct , age undetermined  When compared with ECG of 25-JUN-2021 14:21,  Septal infarct is now present  Nonspecific T wave abnormality no longer evident in Inferior leads  T wave amplitude has increased in Anterior leads     COVID-19 RAPID TEST    Collection Time: 10/16/22  1:35 PM   Result Value Ref Range    Specimen source Nasopharyngeal      COVID-19 rapid test Not detected NOTD     CBC WITH AUTOMATED DIFF    Collection Time: 10/16/22  1:44 PM   Result Value Ref Range    WBC 17.0 (H) 4.1 - 11.1 K/uL    RBC 3.94 (L) 4.10 - 5.70 M/uL    HGB 9.9 (L) 12.1 - 17.0 g/dL    HCT 31.9 (L) 36.6 - 50.3 %    MCV 81.0 80.0 - 99.0 FL    MCH 25.1 (L) 26.0 - 34.0 PG    MCHC 31.0 30.0 - 36.5 g/dL    RDW 17.2 (H) 11.5 - 14.5 %    PLATELET 796 201 - 698 K/uL    MPV 8.6 (L) 8.9 - 12.9 FL    NRBC 0.0 0  WBC    ABSOLUTE NRBC 0.00 0.00 - 0.01 K/uL    NEUTROPHILS 69 32 - 75 %    LYMPHOCYTES 16 12 - 49 %    MONOCYTES 9 5 - 13 %    EOSINOPHILS 3 0 - 7 %    BASOPHILS 1 0 - 1 %    IMMATURE GRANULOCYTES 2 (H) 0.0 - 0.5 %    ABS. NEUTROPHILS 12.0 (H) 1.8 - 8.0 K/UL    ABS. LYMPHOCYTES 2.7 0.8 - 3.5 K/UL    ABS. MONOCYTES 1.5 (H) 0.0 - 1.0 K/UL    ABS. EOSINOPHILS 0.5 (H) 0.0 - 0.4 K/UL    ABS. BASOPHILS 0.1 0.0 - 0.1 K/UL    ABS. IMM. GRANS. 0.3 (H) 0.00 - 0.04 K/UL    DF AUTOMATED     METABOLIC PANEL, COMPREHENSIVE    Collection Time: 10/16/22  1:44 PM   Result Value Ref Range    Sodium 139 136 - 145 mmol/L    Potassium 3.5 3.5 - 5.1 mmol/L    Chloride 104 97 - 108 mmol/L    CO2 27 21 - 32 mmol/L    Anion gap 8 5 - 15 mmol/L    Glucose 78 65 - 100 mg/dL    BUN 6 6 - 20 MG/DL    Creatinine 0.66 (L) 0.70 - 1.30 MG/DL    BUN/Creatinine ratio 9 (L) 12 - 20      eGFR >60 >60 ml/min/1.73m2    Calcium 8.3 (L) 8.5 - 10.1 MG/DL    Bilirubin, total 0.3 0.2 - 1.0 MG/DL    ALT (SGPT) 16 12 - 78 U/L    AST (SGOT) 10 (L) 15 - 37 U/L    Alk.  phosphatase 154 (H) 45 - 117 U/L    Protein, total 6.2 (L) 6.4 - 8.2 g/dL    Albumin 3.0 (L) 3.5 - 5.0 g/dL    Globulin 3.2 2.0 - 4.0 g/dL    A-G Ratio 0.9 (L) 1.1 - 2.2     TROPONIN-HIGH SENSITIVITY    Collection Time: 10/16/22  1:44 PM   Result Value Ref Range    Troponin-High Sensitivity 9 0 - 76 ng/L   NT-PRO BNP    Collection Time: 10/16/22  1:44 PM   Result Value Ref Range    NT pro- (H) <125 PG/ML   BLOOD GAS,CHEM8,LACTIC ACID POC    Collection Time: 10/16/22  1:45 PM   Result Value Ref Range    Calcium, ionized (POC) 1.19 1.12 - 1.32 mmol/L    BICARBONATE 28 mmol/L    Base excess (POC) 1.1 mmol/L    Sample source VENOUS BLOOD      CO2, POC 28 (H) 19 - 24 MMOL/L    Sodium,  136 - 145 MMOL/L    Potassium, POC 3.5 3.5 - 5.5 MMOL/L    Chloride,  100 - 108 MMOL/L    Glucose, POC 69 (L) 74 - 106 MG/DL    Creatinine, POC 0.5 (L) 0.6 - 1.3 MG/DL    Lactic Acid (POC) 1.77 0.40 - 2.00 mmol/L    Critical value read back 66     pH, venous (POC) 7.33 7.32 - 7.42      pCO2, venous (POC) 52.0 (H) 41 - 51 MMHG    pO2, venous (POC) 46 (H) 25 - 40 mmHg       Radiologic Studies -   XR CHEST SNGL V   Final Result   Mild bibasilar atelectasis. CTA CHEST W OR W WO CONT    (Results Pending)     CT Results  (Last 48 hours)      None          CXR Results  (Last 48 hours)                 10/16/22 1352  XR CHEST SNGL V Final result    Impression:  Mild bibasilar atelectasis. Narrative:  INDICATION: Shortness of breath       COMPARISON: 4/15/2021       FINDINGS: AP portable imaging of the chest performed at 1:45 PM demonstrates a   stable cardiomediastinal silhouette. There is mild bibasilar atelectasis. The   lungs are otherwise clear. No significant osseous abnormalities are seen. Medical Decision Making   I am the first provider for this patient. I reviewed the vital signs, available nursing notes, past medical history, past surgical history, family history and social history. Vital Signs-Reviewed the patient's vital signs. Patient Vitals for the past 24 hrs:   Temp Pulse Resp BP SpO2   10/16/22 1452 -- -- -- -- 98 %   10/16/22 1420 -- 78 16 (!) 145/90 --   10/16/22 1321 -- -- -- -- 97 %   10/16/22 1320 98.8 °F (37.1 °C) (!) 110 20 (!) 163/100 --         Provider Notes (Medical Decision Making):   50-year-old male presenting with chest pain and shortness of breath.   Differential includes musculoskeletal pain, pleuritic pain, atypical ACS, bronchitis, pneumonia, viral syndrome, CHF. He is afebrile and nontoxic-appearing, unlikely systemic infection. He is saturating well on room air. ED Course:     Initial assessment performed. The patients presenting problems have been discussed, and they are in agreement with the care plan formulated and outlined with them. I have encouraged them to ask questions as they arise throughout their visit. ED Course as of 10/16/22 2107   Ninoska Cobian Oct 16, 2022   1613 On chart review, has allergy to Zosyn, however he is tolerated cefepime on hospitalization 1/2022, and low cross-reactivity, will likely tolerate ceftriaxone which is ordered here. He has quite complex antibiotic allergies, and the benefits outweigh the risks at this point, which are discussed with the patient and he agrees. [CM]      ED Course User Index  [CM] Min-Wing Hong Nguyen MD      CBC with leukocytosis of 17, anemia with hemoglobin of 9.9 which is not significantly changed from baseline per chart review. His lactic acid is not significantly elevated at 1.77. Patient metabolic panel with normal renal function, no worrisome electrolyte abnormalities. UA is positive for UTI. EKG is performed at 12: 50, shows sinus tachycardia at a rate of 104, , QRS 84, QTc 473, axis upright, no ST segment elevation or depression concerning for ACS, this is interpreted as sinus tachycardia. Troponin is reassuring at 9. Chest x-ray showed mild bibasilar atelectasis. Given recent operation, and prolonged immobilization, will obtain CT PE to assess for PE. His a medical note from 3/2022 is reviewed, he has a history of prior CVA, chronic pain on methadone, chronic pyoderma gangrenosum. Prior urine cultures have not yielded any resistant bacteria per chart review. IV antibiotics will be given for UTI as above.   He is resting comfortably on reevaluation, no longer tachycardic, vitals unremarkable    Patient has refused to CT scan, says that he is in pain and claustrophobic. He is given additional dose of pain medications, continues to refuse. Given leukocytosis, continued tachycardia, with source of infection,extensive comorbidities, extensive p.o. antibiotic allergies, patient will be admitted    Critical Care Time:         Disposition:  Admit    PLAN:  1. Current Discharge Medication List        2.    Follow-up Information    None       Return to ED if worse     Diagnosis     Clinical Impression: Acute urinary tract infection with SIRS criteria, acute intractable left sided chest pain

## 2022-10-16 NOTE — REMOTE MONITORING
Attempted to contact primary RN in regards to the sepsis bundle.      Bridget Scott 19  Callback # [phone]

## 2022-10-17 LAB
ANION GAP SERPL CALC-SCNC: 5 MMOL/L (ref 5–15)
ATRIAL RATE: 104 BPM
BASOPHILS # BLD: 0.1 K/UL (ref 0–0.1)
BASOPHILS NFR BLD: 1 % (ref 0–1)
BUN SERPL-MCNC: 5 MG/DL (ref 6–20)
BUN/CREAT SERPL: 9 (ref 12–20)
CALCIUM SERPL-MCNC: 8.3 MG/DL (ref 8.5–10.1)
CALCULATED P AXIS, ECG09: 43 DEGREES
CALCULATED R AXIS, ECG10: 25 DEGREES
CALCULATED T AXIS, ECG11: 48 DEGREES
CHLORIDE SERPL-SCNC: 107 MMOL/L (ref 97–108)
CO2 SERPL-SCNC: 30 MMOL/L (ref 21–32)
CREAT SERPL-MCNC: 0.57 MG/DL (ref 0.7–1.3)
D DIMER PPP FEU-MCNC: 1.89 MG/L FEU (ref 0–0.65)
DIAGNOSIS, 93000: NORMAL
DIFFERENTIAL METHOD BLD: ABNORMAL
EOSINOPHIL # BLD: 0.5 K/UL (ref 0–0.4)
EOSINOPHIL NFR BLD: 4 % (ref 0–7)
ERYTHROCYTE [DISTWIDTH] IN BLOOD BY AUTOMATED COUNT: 17.3 % (ref 11.5–14.5)
GLUCOSE SERPL-MCNC: 100 MG/DL (ref 65–100)
HCT VFR BLD AUTO: 34 % (ref 36.6–50.3)
HGB BLD-MCNC: 10.3 G/DL (ref 12.1–17)
IMM GRANULOCYTES # BLD AUTO: 0.2 K/UL (ref 0–0.04)
IMM GRANULOCYTES NFR BLD AUTO: 1 % (ref 0–0.5)
LYMPHOCYTES # BLD: 0.9 K/UL (ref 0.8–3.5)
LYMPHOCYTES NFR BLD: 8 % (ref 12–49)
MCH RBC QN AUTO: 24.9 PG (ref 26–34)
MCHC RBC AUTO-ENTMCNC: 30.3 G/DL (ref 30–36.5)
MCV RBC AUTO: 82.3 FL (ref 80–99)
MONOCYTES # BLD: 1.3 K/UL (ref 0–1)
MONOCYTES NFR BLD: 11 % (ref 5–13)
NEUTS SEG # BLD: 8.3 K/UL (ref 1.8–8)
NEUTS SEG NFR BLD: 75 % (ref 32–75)
NRBC # BLD: 0 K/UL (ref 0–0.01)
NRBC BLD-RTO: 0 PER 100 WBC
P-R INTERVAL, ECG05: 156 MS
PLATELET # BLD AUTO: 270 K/UL (ref 150–400)
PMV BLD AUTO: 8.4 FL (ref 8.9–12.9)
POTASSIUM SERPL-SCNC: 3.6 MMOL/L (ref 3.5–5.1)
Q-T INTERVAL, ECG07: 360 MS
QRS DURATION, ECG06: 84 MS
QTC CALCULATION (BEZET), ECG08: 473 MS
RBC # BLD AUTO: 4.13 M/UL (ref 4.1–5.7)
SODIUM SERPL-SCNC: 142 MMOL/L (ref 136–145)
VENTRICULAR RATE, ECG03: 104 BPM
WBC # BLD AUTO: 11.1 K/UL (ref 4.1–11.1)

## 2022-10-17 PROCEDURE — 77010033678 HC OXYGEN DAILY

## 2022-10-17 PROCEDURE — 74011250637 HC RX REV CODE- 250/637: Performed by: STUDENT IN AN ORGANIZED HEALTH CARE EDUCATION/TRAINING PROGRAM

## 2022-10-17 PROCEDURE — 74011000258 HC RX REV CODE- 258: Performed by: STUDENT IN AN ORGANIZED HEALTH CARE EDUCATION/TRAINING PROGRAM

## 2022-10-17 PROCEDURE — 97161 PT EVAL LOW COMPLEX 20 MIN: CPT

## 2022-10-17 PROCEDURE — 74011000250 HC RX REV CODE- 250: Performed by: STUDENT IN AN ORGANIZED HEALTH CARE EDUCATION/TRAINING PROGRAM

## 2022-10-17 PROCEDURE — 94761 N-INVAS EAR/PLS OXIMETRY MLT: CPT

## 2022-10-17 PROCEDURE — 65270000046 HC RM TELEMETRY

## 2022-10-17 PROCEDURE — 85379 FIBRIN DEGRADATION QUANT: CPT

## 2022-10-17 PROCEDURE — 97530 THERAPEUTIC ACTIVITIES: CPT | Performed by: OCCUPATIONAL THERAPIST

## 2022-10-17 PROCEDURE — 85025 COMPLETE CBC W/AUTO DIFF WBC: CPT

## 2022-10-17 PROCEDURE — 97167 OT EVAL HIGH COMPLEX 60 MIN: CPT | Performed by: OCCUPATIONAL THERAPIST

## 2022-10-17 PROCEDURE — 94640 AIRWAY INHALATION TREATMENT: CPT

## 2022-10-17 PROCEDURE — 80048 BASIC METABOLIC PNL TOTAL CA: CPT

## 2022-10-17 PROCEDURE — 74011250636 HC RX REV CODE- 250/636: Performed by: STUDENT IN AN ORGANIZED HEALTH CARE EDUCATION/TRAINING PROGRAM

## 2022-10-17 PROCEDURE — 97530 THERAPEUTIC ACTIVITIES: CPT

## 2022-10-17 PROCEDURE — 36415 COLL VENOUS BLD VENIPUNCTURE: CPT

## 2022-10-17 PROCEDURE — 74011636637 HC RX REV CODE- 636/637: Performed by: STUDENT IN AN ORGANIZED HEALTH CARE EDUCATION/TRAINING PROGRAM

## 2022-10-17 PROCEDURE — 97116 GAIT TRAINING THERAPY: CPT

## 2022-10-17 PROCEDURE — 74011250637 HC RX REV CODE- 250/637: Performed by: INTERNAL MEDICINE

## 2022-10-17 RX ORDER — PANTOPRAZOLE SODIUM 40 MG/1
40 TABLET, DELAYED RELEASE ORAL
Status: DISCONTINUED | OUTPATIENT
Start: 2022-10-17 | End: 2022-11-20 | Stop reason: HOSPADM

## 2022-10-17 RX ORDER — INSULIN LISPRO 100 [IU]/ML
INJECTION, SOLUTION INTRAVENOUS; SUBCUTANEOUS
Status: DISCONTINUED | OUTPATIENT
Start: 2022-10-17 | End: 2022-10-17

## 2022-10-17 RX ORDER — INSULIN GLARGINE 100 [IU]/ML
30 INJECTION, SOLUTION SUBCUTANEOUS
Status: DISCONTINUED | OUTPATIENT
Start: 2022-10-17 | End: 2022-10-17

## 2022-10-17 RX ORDER — ENOXAPARIN SODIUM 100 MG/ML
30 INJECTION SUBCUTANEOUS EVERY 12 HOURS
Status: DISCONTINUED | OUTPATIENT
Start: 2022-10-17 | End: 2022-11-20 | Stop reason: HOSPADM

## 2022-10-17 RX ORDER — OXYCODONE HYDROCHLORIDE 5 MG/1
10 TABLET ORAL
Status: DISCONTINUED | OUTPATIENT
Start: 2022-10-17 | End: 2022-10-18

## 2022-10-17 RX ORDER — FENTANYL 12.5 UG/1
1 PATCH TRANSDERMAL
Status: DISCONTINUED | OUTPATIENT
Start: 2022-10-17 | End: 2022-11-05

## 2022-10-17 RX ORDER — MAGNESIUM SULFATE 100 %
4 CRYSTALS MISCELLANEOUS AS NEEDED
Status: DISCONTINUED | OUTPATIENT
Start: 2022-10-17 | End: 2022-11-20 | Stop reason: HOSPADM

## 2022-10-17 RX ADMIN — IPRATROPIUM BROMIDE AND ALBUTEROL SULFATE 3 ML: .5; 3 SOLUTION RESPIRATORY (INHALATION) at 08:03

## 2022-10-17 RX ADMIN — SODIUM CHLORIDE, PRESERVATIVE FREE 10 ML: 5 INJECTION INTRAVENOUS at 14:39

## 2022-10-17 RX ADMIN — SODIUM CHLORIDE, PRESERVATIVE FREE 10 ML: 5 INJECTION INTRAVENOUS at 06:25

## 2022-10-17 RX ADMIN — OXYCODONE 5 MG: 5 TABLET ORAL at 10:26

## 2022-10-17 RX ADMIN — OXYCODONE 10 MG: 5 TABLET ORAL at 22:53

## 2022-10-17 RX ADMIN — TAMSULOSIN HYDROCHLORIDE 0.4 MG: 0.4 CAPSULE ORAL at 09:20

## 2022-10-17 RX ADMIN — GABAPENTIN 100 MG: 300 CAPSULE ORAL at 09:20

## 2022-10-17 RX ADMIN — OXYCODONE 10 MG: 5 TABLET ORAL at 14:39

## 2022-10-17 RX ADMIN — LEVOTHYROXINE SODIUM 125 MCG: 0.12 TABLET ORAL at 10:26

## 2022-10-17 RX ADMIN — OXYCODONE 5 MG: 5 TABLET ORAL at 00:59

## 2022-10-17 RX ADMIN — IPRATROPIUM BROMIDE AND ALBUTEROL SULFATE 3 ML: .5; 3 SOLUTION RESPIRATORY (INHALATION) at 14:45

## 2022-10-17 RX ADMIN — AMLODIPINE BESYLATE 5 MG: 5 TABLET ORAL at 09:20

## 2022-10-17 RX ADMIN — ENOXAPARIN SODIUM 40 MG: 100 INJECTION SUBCUTANEOUS at 09:21

## 2022-10-17 RX ADMIN — ENOXAPARIN SODIUM 30 MG: 100 INJECTION SUBCUTANEOUS at 21:07

## 2022-10-17 RX ADMIN — CEFTRIAXONE 1 G: 1 INJECTION, POWDER, FOR SOLUTION INTRAMUSCULAR; INTRAVENOUS at 16:45

## 2022-10-17 RX ADMIN — GABAPENTIN 100 MG: 300 CAPSULE ORAL at 21:17

## 2022-10-17 RX ADMIN — ASPIRIN 81 MG CHEWABLE TABLET 81 MG: 81 TABLET CHEWABLE at 09:20

## 2022-10-17 RX ADMIN — OXYCODONE 5 MG: 5 TABLET ORAL at 06:25

## 2022-10-17 RX ADMIN — SODIUM CHLORIDE, PRESERVATIVE FREE 10 ML: 5 INJECTION INTRAVENOUS at 21:10

## 2022-10-17 RX ADMIN — PANTOPRAZOLE SODIUM 40 MG: 40 TABLET, DELAYED RELEASE ORAL at 09:20

## 2022-10-17 RX ADMIN — GABAPENTIN 100 MG: 300 CAPSULE ORAL at 16:45

## 2022-10-17 RX ADMIN — OXYCODONE 10 MG: 5 TABLET ORAL at 18:37

## 2022-10-17 RX ADMIN — METHADONE HYDROCHLORIDE 60 MG: 10 CONCENTRATE ORAL at 09:20

## 2022-10-17 RX ADMIN — IPRATROPIUM BROMIDE AND ALBUTEROL SULFATE 3 ML: .5; 3 SOLUTION RESPIRATORY (INHALATION) at 01:00

## 2022-10-17 RX ADMIN — PREDNISONE 40 MG: 20 TABLET ORAL at 09:20

## 2022-10-17 RX ADMIN — ALLOPURINOL 100 MG: 100 TABLET ORAL at 10:26

## 2022-10-17 NOTE — ED NOTES
On call light--asking to have head raised--  Nurse told patient she understands patient is having a hard time getting comfortable  However, he needs to start using the bed controls on the side of the bed --nurse will not be able to raise and lower bed every 5-10 minutes--she will help when she can--  But, when she is is unavailable --he needs to roll over and use the bed controls---

## 2022-10-17 NOTE — PROGRESS NOTES
Pt is c/o constant 10/10 back pain. Scheduled methadone and gabapentin along with PRN oxy have been administered. Pt is stating that he \"would like to be transferred to another hospital if we cannot figure out what is wrong with his back. \" He is currently calling out for is doctor. MD notified.

## 2022-10-17 NOTE — PROGRESS NOTES
Attempted to all report x3. No answer at [facility] or [facility].

## 2022-10-17 NOTE — PROGRESS NOTES
Verbal report received from Report received from Leighton Ruiz RN.(name) on Salomon Jacome. Report consisted of patients Situation, Background, Assessment and   Recommendations(SBAR). Information from the following report(s) SBAR and ED Summary was reviewed with the receiving nurse. Opportunity for questions and clarification was provided. Assessment completed upon patients arrival to unit and care assumed.

## 2022-10-17 NOTE — PROGRESS NOTES
Problem: Self Care Deficits Care Plan (Adult)  Goal: *Acute Goals and Plan of Care (Insert Text)  Description: FUNCTIONAL STATUS PRIOR TO ADMISSION: had L AKA at [facility] 5 weeks ago, female friend has been assisting with sliding board transfers or stand pivot transfers to and from power wheelchair or BSC SBA, sponge bathing with bath wipes with assist for RLE, able to perform UB ADLS with set up, assist with toileting and LB dressing, had recently stopped using grace lift due to improved independence with transfers, going to OP PT/OT at [facility], [place] and they were setting up aide services, pt needs a new hospital bed due to it being in disrepair, uses power chair for mobility independently with RUE as LUE is non functional from old [address]: The patient lived alone with a friend to provide assistance. Pt reports his friend has been assisting since d/c from [facility] and that she may not be able to continue to assist long term    Occupational Therapy Goals:  Initiated 10/17/2022  1. Patient will perform grooming seated with good balance with noe technique and supervision implements provided within 7 days. 2. Patient will perform upper body dressing with supervision/set-up within 7 days. 3. Patient will perform toileting with moderate assistance  within 7 days. 4. Patient will perform sponge bath seated with minimal assist within 7 days. 5. Patient will transfer from drop arm bedside commode or drop arm recliner with moderate assistance with best technique within 7 days. Outcome: Not Met   OCCUPATIONAL THERAPY EVALUATION  Patient: Ed Castillo (89 y.o. male)  Date: 10/17/2022  Primary Diagnosis: COPD with acute exacerbation (HCC) [J44.1]       Precautions:  Fall, Contact (ESBL wound)    ASSESSMENT  Based on the objective data described below, the patient presents with report of significant back pain and pt was unable to get comfortable in a supine or seated position.   Pt reports no injury and that pain started recently. Per xrays pt does have a T12 compression fracture. Left UE is non functional due to previous CVA with increased flexor tone and clonus. RUE is functional but severely swollen. Pt does have a variety of abrasions and wounds throughout his body. He had left [facility]A at [facility] ~5 weeks ago and was needing caregiver support. He reports not being on home O2 but does have a history of smoking. O2 sat on room air upon arrival was 84% and pt needed 2L NC to maintain O2 sats at 94%. Moderate assist x2 needed for supine to sit and consistent min assist needed for balance. Pt was unable to scoot this session, nor was he able to attempt sit to stand. Currently pt is performing ADLS at set up to total assist level. If pt returns to home he will need significant assist.  Recommend rehab at discharge. Vitals:    10/17/22 0936    BP: (!) 183/126    BP 1 Location: Right arm    BP Patient Position: Sitting    Pulse:     Temp:     Resp:     Height:     Weight:     SpO2: (!) 86% room air 95% 2L NC           Current Level of Function Impacting Discharge (ADLs/self-care): moderate assist x2 bed mobility supine to sit and min assist sit to supine, total assist to scoot    Feeding: Setup    Oral Facial Hygiene/Grooming: Setup    Bathing: Maximum assistance    Upper Body Dressing: Maximum assistance    Lower Body Dressing: Total assistance    Toileting: Total assistance    Functional Outcome Measure: The patient scored 15/100 on the barthel outcome measure which is indicative of significant decline in mobility and ADL.       Other factors to consider for discharge: severe back pain, pt reports that if his pain is not controlled he is \"going to shoot himself\" and that \" he cannot live like this\", Nurse immediately notified as well as CM, recommend palliative care consult for pain management and medical complexity     Patient will benefit from skilled therapy intervention to address the above noted impairments. PLAN :  Recommendations and Planned Interventions: self care training, functional mobility training, therapeutic exercise, balance training, therapeutic activities, patient education, home safety training, and family training/education    Frequency/Duration: Patient will be followed by occupational therapy 3 times a week to address goals. Recommendation for discharge: (in order for the patient to meet his/her long term goals)  Rehab, if pt returns to home he will need significant caregiver assist    This discharge recommendation:  Has been made in collaboration with the attending provider and/or case management    IF patient discharges home will need the following DME: hospital bed, new shrinkers for left residual limb (pt reports his current ones don't fit and \"fall off\"       SUBJECTIVE:   Patient stated I can't live like this. My back pain is a 10/10 it doesn't go away. If I had a gun I would shoot myself.   Nurse immediately notified of pts report    OBJECTIVE DATA SUMMARY:   HISTORY:   Past Medical History:   Diagnosis Date    Aneurysm ([address]     (with stroke) brain    Bladder tumor     Cancer ([address]     bladder CA    Chronic pain     Family history of bladder cancer     GERD (gastroesophageal reflux disease)     Gout     History of vascular access device 04/25/2019    [facility] VAT 4 FR MIDLINE R Cephalic Limited access    History of vascular access device 04/26/2019    4 fr Midline right Cephalic midline access    Hypercholesterolemia     Hypertension     Lesion of bladder     Seizures ([address]     Stroke ([address] 2006    left sided weakness    Thromboembolus ([address]     left leg    Thyroid disease     TIA (transient ischemic attack) 2012     Past Surgical History:   Procedure Laterality Date    HX ORTHOPAEDIC      R arthroscopy    HX OTHER SURGICAL      x2 L foot    HX UROLOGICAL  04/20/2018    Cystoscopy, TURBT (greater than 5 cm resected) Dr. Judit John, [facility].     KNEE ARTHROSCP HARV      left knee TRANSURETHRAL RESEC BLADDER NECK  08/10/2018       Expanded or extensive additional review of patient history:     Home Situation  Home Environment: Apartment  # Steps to Enter: 0  One/Two Story Residence: One story  Living Alone: Yes (friend with him right now assisting with transfers and dressing)  Support Systems: Friend/Neighbor  Current DME Used/Available at [place]: Wheelchair, power, Hospital bed, Shower chair, Wheelchair, Commode, bedside (sliding board, electric scooter, grace)  Tub or Shower Type: Tub/Shower combination    Hand dominance: Right    EXAMINATION OF PERFORMANCE DEFICITS:  Cognitive/Behavioral Status:  Neurologic State: Alert  Orientation Level: Oriented X4  Cognition: Follows commands  Perception: Cues to maintain midline in sitting  Perseveration: No perseveration noted  Safety/Judgement: Fall prevention    Skin: multiple abrasions and wounds throughout his body    Edema: severe RUE    Hearing:   Auditory  Auditory Impairment: Hard of hearing, bilateral    Vision/Perceptual:                                Corrective Lenses: Reading glasses    Range of Motion:    AROM: Grossly decreased, non-functional (LUE (old noe) with contracture; other decreased with faily functional range)                         Strength:    Strength: Generally decreased, functional                Coordination:  Coordination: Generally decreased, functional  Fine Motor Skills-Upper: Left Impaired;Right Intact (left UE non functional)    Gross Motor Skills-Upper: Left Impaired;Right Intact (LUE non functional, contracted and spastic due to old CVA)    Tone & Sensation:    Tone: Abnormal (LUE, old CVA,)  Sensation: Impaired                      Balance:  Sitting: Impaired  Sitting - Static: Good (unsupported)  Sitting - Dynamic: Fair (occasional)  Standing:  (NT)    Functional Mobility and Transfers for ADLs:  Bed Mobility:  Supine to Sit: Moderate assistance;Assist x2 (elevated HOB)  Sit to Supine: Minimum assistance;Assist x2 (elevated HOB)  Scooting: Total assistance    Transfers:  Sit to Stand:  (unable to attempt today)  Bed to Chair:  (unable to attempt today)  Bathroom Mobility:  (unable)  Toilet Transfer :  (unable to attempt today)    ADL Assessment:  Feeding: Setup    Oral Facial Hygiene/Grooming: Setup    Bathing: Maximum assistance    Upper Body Dressing: Maximum assistance    Lower Body Dressing: Total assistance    Toileting: Total assistance                  ADL Intervention and task modifications: Focus on seated edge of bed tolerance/balance, repositioning in bed in prep for ADLS. Limited by pain    Cognitive Retraining  Safety/Judgement: Fall prevention       Functional Measure:    Barthel Index:  Bathin  Bladder: 5  Bowels: 5  Groomin  Dressin  Feedin  Mobility: 0  Stairs: 0  Toilet Use: 0  Transfer (Bed to Chair and Back): 0  Total: 15/100      The Barthel ADL Index: Guidelines  1. The index should be used as a record of what a patient does, not as a record of what a patient could do. 2. The main aim is to establish degree of independence from any help, physical or verbal, however minor and for whatever reason. 3. The need for supervision renders the patient not independent. 4. A patient's performance should be established using the best available evidence. Asking the patient, friends/relatives and nurses are the usual sources, but direct observation and common sense are also important. However direct testing is not needed. 5. Usually the patient's performance over the preceding 24-48 hours is important, but occasionally longer periods will be relevant. 6. Middle categories imply that the patient supplies over 50 per cent of the effort. 7. Use of aids to be independent is allowed.     Score Interpretation (from [address] [place] [address])    Independent   60-79 Minimally independent   40-59 Partially dependent   20-39 Very dependent   <20 Totally dependent     -Danyelle Good., Barthel, ROBY (1965). Functional evaluation: the Barthel Index. [address] ([address] (1997). The Barthel activities of daily living index: self-reporting versus actual performance in the old (> or = 75 years). Journal of [address] [place] [address] 45(7), [facility], ISAI, Kaveh Dixon., Derian Rollins. (1999). Measuring the change in disability after inpatient rehabilitation; comparison of the responsiveness of the Barthel Index and Functional [place] Measure. Journal of Neurology, Neurosurgery, and Psychiatry, 66(4), 781-151. DORCAS Mathews, ELEANOR Hines, & Karlee Taylor M.A. (2004) Assessment of post-stroke quality of life in cost-effectiveness studies: The usefulness of the Barthel Index and the EuroQoL-5D. Quality of Life Research, 15, 318-41           Occupational Therapy Evaluation Charge Determination   History Examination Decision-Making   HIGH Complexity : Extensive review of history including physical, cognitive and psychosocial history  HIGH Complexity : 5 or more performance deficits relating to physical, cognitive , or psychosocial skils that result in activity limitations and / or participation restrictions HIGH Complexity : Patient presents with comorbidities that affect occupational performance.  Signifigant modification of tasks or assistance (eg, physical or verbal) with assessment (s) is necessary to enable patient to complete evaluation       Based on the above components, the patient evaluation is determined to be of the following complexity level: HIGH   Pain Rating:  10/10 back pain at rest and with activity, pt was given pain medication by nursing prior to session, nursing notified that pain was not relieved    Activity Tolerance:   Fair and desaturates with exertion and requires oxygen    After treatment patient left in no apparent distress:    Supine in bed, Call bell within reach, Side rails x 3, and left UE supported with folded blankets, pt was given control for bed in his lap to adjust bed as needed    COMMUNICATION/EDUCATION:   The patients plan of care was discussed with: Physical therapist, Registered nurse, Case management, and patient . Patient/family have participated as able in goal setting and plan of care. and Patient/family agree to work toward stated goals and plan of care. This patients plan of care is appropriate for delegation to [facility].     Thank you for this referral.  Adelaida Hall, OTR/L  Time Calculation: 21 mins

## 2022-10-17 NOTE — ED NOTES
Patient sleeping  Call light within reach  O2 sat drops at times--does come back up  Mostly between 95-96% RA

## 2022-10-17 NOTE — ED NOTES
Admitted Telemetry  Dx COPD Exacerbation  Dr Gee Alcantar    Full Code    Recent AKA of Left leg--was seen the day before--returned yesterday for complaint of back pain--    #18 Right upper Arm    Left stump healing well--no signs of infection--    Left arm--contracted--only gross motor movement at the shoulder--hand is contracted in a fist--no use of left arm or hand--    All skin is adrianne, scattered bruising, pitting edema, redness, weeping     Has a heart shaped ulcer on the left inner buttock --[id number]    O2 sat on Room air is over 90% until deep sleep--  O2 @2L/NC needed to be applied for deep sleep period--    Able to use urinal independently--  Requesting assistance with bed up and down    Reg Diet--4 Carb Choice, Low Fat, Low Chol, High Fiber 2Gm Na    Consult for Wound Care--Multiple skin tears, G-Tube Site removed, Recent AKA    Consult for OT/PT/RT    Activity as tolerated with assist--  OOB with assist  Cardiac Monitoring  I/O every 8 hours  Vital Signs per routine  Weigh patient daily    Frequent harsh cough--  Scattered wheezes  Moans most of the time when awake    Last medicated for back pain approx 0630 AM

## 2022-10-17 NOTE — PROGRESS NOTES
Problem: Mobility Impaired (Adult and Pediatric)  Goal: *Acute Goals and Plan of Care (Insert Text)  Description: FUNCTIONAL STATUS PRIOR TO ADMISSION: Pt lives at home, had been alone but recently since amputation his friend has been staying with him. Pt pt's request, talked with friend on phone to get status PTA. She stated pt was functioning at w/c level with an electric scooter; needed SBA for either pivot transfer or sliding board transfer (does have grace but she states he did better without); needed help with dressing and uses wipes for bathing because he could not get onto shower chair any longer. She states he was going to OPPT/OT at [facility]. She states they were apparently working on getting him a care aide. Pt also states that he has a  for his L AKA (5 weeks ago) but it no longer fits. He was unable to fill in where he got it. HOME SUPPORT PRIOR TO ADMISSION: The patient lived alone with friend to provide assistance. Physical Therapy Goals  Initiated 10/17/2022  1. Patient will move from supine to sit and sit to supine , scoot up and down, and roll side to side in bed with modified independence within 7 day(s). 2.  Patient will transfer from bed to chair and chair to bed with minimal assistance/contact guard assist using the least restrictive device within 7 day(s). 3.  Patient will show good sitting balance static and dynamic for safe bed mobility and transfers within 7 days.     Outcome: Not Met   PHYSICAL THERAPY EVALUATION  Patient: Patricia Knox (5 y.o. male)  Date: 10/17/2022  Primary Diagnosis: COPD with acute exacerbation (HCC) [J44.1]       Precautions: fall; on 3L at eval      ASSESSMENT  Based on the objective data described below, the patient presents with SOB, decreased sats on room air, decreased functional mobility, balance and activity tolerance with complicated hx of recent L AKA (5 weeks ago), COPD, chronic pain on methadone and CVA with non-functional LUE with flexor contracture, and found to have T12 compression fx (indeterminate age) and UTI as well as exacerbation of COPD. Pt received in bed, very uncomfortable with back pain. He did not have his O2 on and was satting in mid [de-identified]. O2 at 3L was replaced and sats returned to 94%. Pt very impulsive in attempting to move to edge of bed because of breathing and pain. He needed mod A of 2 to complete safely from elevated HOB. Pt showed fair to good sitting balance with RLE on floor side sitting over edge of bed. He was unable to scoot at all and required total A for repositioning in bed. At this time c/o 10/10 pain mid back and did not tolerate further mobility. Further clarification of services being set up by [facility] is needed and how much long friend can assist at home. He appears to have needed equipment but is currently well below his recent baseline (friend says sudden decline in function with increased pain and more difficulty breathing since Thursday). Pt would benefit from palliative consult. He may need rehab prior to return home    Current Level of Function Impacting Discharge (mobility/balance): see details above    Functional Outcome Measure: The patient scored 25/100 on the barthel outcome measure which is indicative of 75% functional mobility. Other factors to consider for discharge: decline in function with SOB and back pain, fall risk, recent AKA (incision well healed), friend support but unsure how much longer; recommend palliative consult     Patient will benefit from skilled therapy intervention to address the above noted impairments. PLAN :  Recommendations and Planned Interventions: bed mobility training, transfer training, gait training, therapeutic exercises, patient and family training/education, and therapeutic activities      Frequency/Duration: Patient will be followed by physical therapy:  3 times a week to address goals.     Recommendation for discharge: (in order for the patient to meet his/her long term goals)  To be determined: rehab    This discharge recommendation:  Has been made in collaboration with the attending provider and/or case management    IF patient discharges home will need the following DME: patient owns DME required for discharge         SUBJECTIVE:   Patient stated I don't want to live like this (with the pain).     OBJECTIVE DATA SUMMARY:   HISTORY:    Past Medical History:   Diagnosis Date    Aneurysm ([facility] [place] [address]     (with stroke) brain    Bladder tumor     Cancer ([address]     bladder CA    Chronic pain     Family history of bladder cancer     GERD (gastroesophageal reflux disease)     Gout     History of vascular access device 04/25/2019    [facility] VAT 4 FR MIDLINE R Cephalic Limited access    History of vascular access device 04/26/2019    4 fr Midline right Cephalic midline access    Hypercholesterolemia     Hypertension     Lesion of bladder     Seizures ([facility] [place] [address]     Stroke ([facility] [place] [address] 2006    left sided weakness    Thromboembolus ([facility] [place] [address]     left leg    Thyroid disease     TIA (transient ischemic attack) 2012     Past Surgical History:   Procedure Laterality Date    HX ORTHOPAEDIC      R arthroscopy    HX OTHER SURGICAL      x2 L foot    HX UROLOGICAL  04/20/2018    Cystoscopy, TURBT (greater than 5 cm resected) Dr. Judit John, [facility].     KNEE ARTHROSCP HARV      left knee    TRANSURETHRAL RESEC BLADDER NECK  08/10/2018       Personal factors and/or comorbidities impacting plan of care:     Home Situation  Home Environment: Apartment  # Steps to Enter: 0  One/Two Story Residence: One story  Living Alone: Yes (friend with him right now assisting with transfers and dressing)  Support Systems: Friend/Neighbor  Current DME Used/Available at [facility]: Wheelchair, power, Hospital bed, Shower chair, Wheelchair, Commode, bedside (sliding board, electric scooter, grace)  Tub or Shower Type: Tub/Shower combination    EXAMINATION/PRESENTATION/DECISION MAKING:   Critical Behavior:  Neurologic State: Alert  Orientation Level: Oriented X4  Cognition: Follows commands     Hearing: Auditory  Auditory Impairment: Hard of hearing, bilateral    Range Of Motion:  AROM: Grossly decreased, non-functional (LUE (old noe) with contracture; other decreased with faily functional range)                       Strength:    Strength: Generally decreased, functional                    Tone & Sensation:   Tone: Abnormal (LUE, old CVA,)              Sensation: Impaired               Coordination:  Coordination: Generally decreased, functional  Vision:   Corrective Lenses: Reading glasses  Functional Mobility:  Bed Mobility:     Supine to Sit: Moderate assistance;Assist x2 (elevated HOB)  Sit to Supine: Minimum assistance;Assist x2 (elevated HOB)  Scooting: Total assistance  Transfers:                             Balance:   Sitting: Impaired  Sitting - Static: Good (unsupported)  Sitting - Dynamic: Fair (occasional)  Standing:  (NT)      Functional Measure:  Barthel Index:    Bathin  Bladder: 5  Bowels: 5  Groomin  Dressin  Feeding: 10  Mobility: 0  Stairs: 0  Toilet Use: 0  Transfer (Bed to Chair and Back): 0  Total: 25/100       The Barthel ADL Index: Guidelines  1. The index should be used as a record of what a patient does, not as a record of what a patient could do. 2. The main aim is to establish degree of independence from any help, physical or verbal, however minor and for whatever reason. 3. The need for supervision renders the patient not independent. 4. A patient's performance should be established using the best available evidence. Asking the patient, friends/relatives and nurses are the usual sources, but direct observation and common sense are also important. However direct testing is not needed. 5. Usually the patient's performance over the preceding 24-48 hours is important, but occasionally longer periods will be relevant.   6. Middle categories imply that the patient supplies over 50 per cent of the effort. 7. Use of aids to be independent is allowed. Score Interpretation (from [address])    Independent   60-79 Minimally independent   40-59 Partially dependent   20-39 Very dependent   <20 Totally dependent     -Danyelle Good., Barthel, D.W. (1965). Functional evaluation: the Barthel Index. [address] ([address] (1997). The Barthel activities of daily living index: self-reporting versus actual performance in the old (> or = 75 years). Journal of [address] [place] [address] 45(7), [address] [facility], J.J.M.F, Megan Zurita., [facility]. (1999). Measuring the change in disability after inpatient rehabilitation; comparison of the responsiveness of the Barthel Index and Functional Weber Measure. Journal of Neurology, Neurosurgery, and Psychiatry, 66(4), 207-123. DORCAS Marie, ELEANOR Hines, & Jian Gold M.A. (2004) Assessment of post-stroke quality of life in cost-effectiveness studies: The usefulness of the Barthel Index and the EuroQoL-5D.  Quality of Life Research, 15, 143-97           Physical Therapy Evaluation Charge Determination   History Examination Presentation Decision-Making   HIGH Complexity :3+ comorbidities / personal factors will impact the outcome/ POC  HIGH Complexity : 4+ Standardized tests and measures addressing body structure, function, activity limitation and / or participation in recreation  MEDIUM Complexity : Evolving with changing characteristics  LOW Complexity : FOTO score of       Based on the above components, the patient evaluation is determined to be of the following complexity level: LOW     Pain Rating:  10/10 mid back, patch in place, methadone given, nurse following with pain meds, pt place in position of comfort    Activity Tolerance:   Poor, desaturates with exertion and requires oxygen, and observed SOB with activity    After treatment patient left in no apparent distress:   Supine in bed, Call bell within reach, and Side rails x 3    COMMUNICATION/EDUCATION:   The patients plan of care was discussed with: Occupational therapist, Registered nurse, and Case management. Fall prevention education was provided and the patient/caregiver indicated understanding., Patient/family have participated as able in goal setting and plan of care. , and Patient/family agree to work toward stated goals and plan of care.     Thank you for this referral.  Farhad Bonilla, PT   Time Calculation: 19 mins

## 2022-10-17 NOTE — ED NOTES
Patient asked to sit on the side of the bed  Explained that nurse was not able to put him in that position safely--  Did assist with moving patient up on the bed--  Repositioning him to a high shaikh's to promote comfort and better cough--  Patient agreed it was acceptable--  Patient will let nurse know when he is ready for bed to be lowered down

## 2022-10-17 NOTE — ED NOTES
Patient moved to Treatment room 35  Max assist  Patient refusing Blood pressure cuff  Patient had old stool on rectum when diaper checked  Patient cleaned up  Protective foam dressing on coccyx applied  Heart shaped 2x2 area of breakdown noted to the left buttock just to the side of the rectal area--skin loss noted--area is red--no eschar noted  Patient has Left AKA--  Right lower leg is reddened with swelling that is pitting  Right arm pitting edema that is reddened  Left arm does not move--just some gross motor movement of the shoulder--Left hand is in a clenched locked fist position--  Patient hollars when you touch his left arm or left hand  Patient moaning a lot--asked why--says his back hurts--

## 2022-10-17 NOTE — PROGRESS NOTES
Hospitalist Progress Note    NAME: Kay Gudino   :  1965   MRN:  [id number]       Assessment / Plan:  Severe back pain-likely secondary to T12 inferior vertebral endplate compression fracture  Chronic pain on methadone  Debility secondary to CVA and recent left AKA  Chronic wounds  UTI with recent Gross  Mild COPD exacerbation  History of bladder cancer  History of gout  GERD  Essential hypertension  Hypothyroidism     PLAN:     Severe back pain-likely secondary to   T12 inferior vertebral endplate compression fracture  Chronic pain on methadone  Debility secondary to CVA and recent left AKA  Admit to telemetry monitoring  Continue PTA methadone  Oxycodone 10 mg every 4 hours as needed breakthrough pain  Started on fentanyl patch  NSR consulted  PT/OT consulted    Chronic wounds  Wound care consulted, greatly appreciate their expertise-  no evidence of cellulitic lesions on exam     UTI with recent Gross  History of bladder cancer  Ceftriaxone daily x5 days  Continue PTA Flomax     Mild COPD exacerbation  Schedule DuoNebs every 6 hours x24 hours with every 4 hours as needed breakthrough nebs  Prednisone 40 mg daily for 5 days  Hold off on azithromycin at present given mild symptoms    History of gout  No evidence of active flare  Continue PTA allopurinol     GERD  Protonix daily     Essential hypertension  Continue PTA amlodipine, aspirin     Hypothyroidism  Continue PTA Synthroid     Incidental findings requiring outpatient follow up/ evaluation:  3 mm right lung nodule. -Guidelines recommend repeat scan in 12 months  Increase in size of intermediate density left renal lesion. Recommend  follow-up with ultrasound in 6 months. 30.0 - 39.9 Obese / Body mass index is 30.79 kg/m². Estimated discharge date:     Code status: Full  Prophylaxis: Lovenox  Recommended Disposition:  tbd     Subjective:     Chief Complaint / Reason for Physician Visit  \"Back pain\".   Discussed with RN events overnight. Patient seen and examined during the presence of nurse  Patient says bed is uncomfortable, and back pain is not controlled. Review of Systems:  Symptom Y/N Comments  Symptom Y/N Comments   Fever/Chills    Chest Pain n    Poor Appetite    Edema     Cough    Abdominal Pain n    Sputum    Joint Pain     SOB/BECK n   Pruritis/Rash     Nausea/vomit n   Tolerating PT/OT     Diarrhea    Tolerating Diet     Constipation    Other       Could NOT obtain due to:      Objective:     VITALS:   Last 24hrs VS reviewed since prior progress note.  Most recent are:  Patient Vitals for the past 24 hrs:   Temp Pulse Resp BP SpO2   10/17/22 1445 -- -- -- -- 94 %   10/17/22 1026 97.9 °F (36.6 °C) 99 20 (!) 154/96 99 %   10/17/22 0936 -- -- -- (!) 183/126 (!) 86 %   10/17/22 0845 -- 95 19 (!) 147/89 99 %   10/17/22 0803 -- -- -- -- 100 %   10/17/22 0700 -- 99 12 (!) 153/104 100 %   10/17/22 0648 -- 100 12 (!) 164/103 99 %   10/17/22 0618 -- (!) 111 18 (!) 165/108 98 %   10/17/22 0603 -- 100 14 (!) 164/102 100 %   10/17/22 0548 -- 93 15 (!) 156/96 99 %   10/17/22 0503 -- (!) 104 17 (!) 161/90 94 %   10/17/22 0433 -- (!) 104 17 (!) 164/114 90 %   10/17/22 0229 -- 94 16 -- 96 %   10/17/22 0103 -- (!) 101 12 -- 100 %   10/17/22 0014 -- (!) 101 11 -- 95 %   10/17/22 0003 -- 94 18 -- 97 %   10/17/22 0002 -- 93 10 -- 98 %   10/17/22 0000 -- 96 22 -- 93 %   10/16/22 2345 -- 98 23 -- 97 %   10/16/22 2303 -- (!) 104 14 -- 96 %   10/16/22 2301 -- (!) 103 14 (!) 142/92 95 %   10/16/22 2040 -- 93 13 -- 93 %   10/16/22 2030 -- 95 28 -- 96 %   10/16/22 2000 -- 100 -- (!) 162/106 --   10/16/22 1958 98.3 °F (36.8 °C) 98 16 (!) 158/120 93 %   10/16/22 1900 -- (!) 116 -- (!) 170/123 --   10/16/22 1740 -- -- -- (!) 160/69 --   10/16/22 1737 -- 92 -- (!) 179/105 --       Intake/Output Summary (Last 24 hours) at 10/17/2022 1655  Last data filed at 10/17/2022 1650  Gross per 24 hour   Intake 50 ml   Output 2200 ml   Net -2150 ml        I had a face to face encounter and independently examined this patient on 10/17/2022, as outlined below:  PHYSICAL EXAM:  General: Alert, cooperative, appears stated age  Resp:  CTA bilaterally, no wheezing or rales. No accessory muscle use  CV:  Regular  rhythm,  No edema  GI:  Soft, Non distended, Non tender. +Bowel sounds  Neurologic:  Alert and oriented X 3, normal speech,   Psych:    Not anxious nor agitated  Skin:  Skin tears and wounds in various stages of healing on extremities  Extremities      Left AKA    Reviewed most current lab test results and cultures  YES  Reviewed most current radiology test results   YES  Review and summation of old records today    NO  Reviewed patient's current orders and MAR    YES  PMH/ reviewed - no change compared to H&P  ________________________________________________________________________  Care Plan discussed with:    Comments   Patient x    Family      RN x    Care Manager     Consultant                       x Multidiciplinary team rounds were held today with , nursing, pharmacist and clinical coordinator. Patient's plan of care was discussed; medications were reviewed and discharge planning was addressed. ________________________________________________________________________  Total NON critical care TIME:  40   Minutes    Total CRITICAL CARE TIME Spent:   Minutes non procedure based      Comments   >50% of visit spent in counseling and coordination of care x    ________________________________________________________________________  Barbara Tate MD     Procedures: see electronic medical records for all procedures/Xrays and details which were not copied into this note but were reviewed prior to creation of Plan. LABS:  I reviewed today's most current labs and imaging studies.   Pertinent labs include:  Recent Labs     10/17/22  0412 10/16/22  1344   WBC 11.1 17.0*   HGB 10.3* 9.9*   HCT 34.0* 31.9*    354     Recent Labs     10/17/22  0412 10/16/22  1344    139   K 3.6 3.5    104   CO2 30 27    78   BUN 5* 6   CREA 0.57* 0.66*   CA 8.3* 8.3*   ALB  --  3.0*   TBILI  --  0.3   ALT  --  16       Signed: Barbara Tate MD

## 2022-10-17 NOTE — PROGRESS NOTES
P&T-Approved DVT Prophylaxis Dosing    Per P&T Committee-approved protocol enoxaparin 40mg daily has been adjusted to enoxaparin 30mg bid based on weight and renal function as shown in the table below.          [place], [address]

## 2022-10-18 LAB
ANION GAP SERPL CALC-SCNC: 8 MMOL/L (ref 5–15)
BUN SERPL-MCNC: 5 MG/DL (ref 6–20)
BUN/CREAT SERPL: 11 (ref 12–20)
CALCIUM SERPL-MCNC: 8.8 MG/DL (ref 8.5–10.1)
CHLORIDE SERPL-SCNC: 101 MMOL/L (ref 97–108)
CO2 SERPL-SCNC: 31 MMOL/L (ref 21–32)
CREAT SERPL-MCNC: 0.47 MG/DL (ref 0.7–1.3)
ERYTHROCYTE [DISTWIDTH] IN BLOOD BY AUTOMATED COUNT: 16.9 % (ref 11.5–14.5)
GLUCOSE SERPL-MCNC: 111 MG/DL (ref 65–100)
HCT VFR BLD AUTO: 32 % (ref 36.6–50.3)
HGB BLD-MCNC: 9.9 G/DL (ref 12.1–17)
MCH RBC QN AUTO: 24.8 PG (ref 26–34)
MCHC RBC AUTO-ENTMCNC: 30.9 G/DL (ref 30–36.5)
MCV RBC AUTO: 80 FL (ref 80–99)
NRBC # BLD: 0 K/UL (ref 0–0.01)
NRBC BLD-RTO: 0 PER 100 WBC
PLATELET # BLD AUTO: 298 K/UL (ref 150–400)
PMV BLD AUTO: 8.6 FL (ref 8.9–12.9)
POTASSIUM SERPL-SCNC: 4.1 MMOL/L (ref 3.5–5.1)
RBC # BLD AUTO: 4 M/UL (ref 4.1–5.7)
SODIUM SERPL-SCNC: 140 MMOL/L (ref 136–145)
WBC # BLD AUTO: 14.1 K/UL (ref 4.1–11.1)

## 2022-10-18 PROCEDURE — 80048 BASIC METABOLIC PNL TOTAL CA: CPT

## 2022-10-18 PROCEDURE — 85027 COMPLETE CBC AUTOMATED: CPT

## 2022-10-18 PROCEDURE — 74011000258 HC RX REV CODE- 258: Performed by: STUDENT IN AN ORGANIZED HEALTH CARE EDUCATION/TRAINING PROGRAM

## 2022-10-18 PROCEDURE — 74011250637 HC RX REV CODE- 250/637: Performed by: GENERAL ACUTE CARE HOSPITAL

## 2022-10-18 PROCEDURE — 2709999900 HC NON-CHARGEABLE SUPPLY

## 2022-10-18 PROCEDURE — 74011636637 HC RX REV CODE- 636/637: Performed by: STUDENT IN AN ORGANIZED HEALTH CARE EDUCATION/TRAINING PROGRAM

## 2022-10-18 PROCEDURE — 74011000250 HC RX REV CODE- 250: Performed by: STUDENT IN AN ORGANIZED HEALTH CARE EDUCATION/TRAINING PROGRAM

## 2022-10-18 PROCEDURE — 74011250637 HC RX REV CODE- 250/637: Performed by: STUDENT IN AN ORGANIZED HEALTH CARE EDUCATION/TRAINING PROGRAM

## 2022-10-18 PROCEDURE — 94761 N-INVAS EAR/PLS OXIMETRY MLT: CPT

## 2022-10-18 PROCEDURE — 77010033678 HC OXYGEN DAILY

## 2022-10-18 PROCEDURE — 36415 COLL VENOUS BLD VENIPUNCTURE: CPT

## 2022-10-18 PROCEDURE — 74011250637 HC RX REV CODE- 250/637: Performed by: INTERNAL MEDICINE

## 2022-10-18 PROCEDURE — 77030041074 HC DRSG AG OPTIFRM MDII -A

## 2022-10-18 PROCEDURE — 65270000046 HC RM TELEMETRY

## 2022-10-18 PROCEDURE — 74011250636 HC RX REV CODE- 250/636: Performed by: STUDENT IN AN ORGANIZED HEALTH CARE EDUCATION/TRAINING PROGRAM

## 2022-10-18 PROCEDURE — 77030040718 HC DRSG HYDRGEL SKINTEGRITY MDII -A

## 2022-10-18 RX ORDER — HYDRALAZINE HYDROCHLORIDE 20 MG/ML
10 INJECTION INTRAMUSCULAR; INTRAVENOUS
Status: DISCONTINUED | OUTPATIENT
Start: 2022-10-18 | End: 2022-11-20 | Stop reason: HOSPADM

## 2022-10-18 RX ORDER — OXYCODONE HYDROCHLORIDE 5 MG/1
10 TABLET ORAL
Status: DISCONTINUED | OUTPATIENT
Start: 2022-10-18 | End: 2022-10-26

## 2022-10-18 RX ADMIN — OXYCODONE 10 MG: 5 TABLET ORAL at 08:55

## 2022-10-18 RX ADMIN — ALLOPURINOL 100 MG: 100 TABLET ORAL at 08:55

## 2022-10-18 RX ADMIN — OXYCODONE 10 MG: 5 TABLET ORAL at 15:57

## 2022-10-18 RX ADMIN — GABAPENTIN 100 MG: 300 CAPSULE ORAL at 15:57

## 2022-10-18 RX ADMIN — OXYCODONE 10 MG: 5 TABLET ORAL at 21:36

## 2022-10-18 RX ADMIN — GABAPENTIN 100 MG: 300 CAPSULE ORAL at 21:36

## 2022-10-18 RX ADMIN — PREDNISONE 40 MG: 20 TABLET ORAL at 08:55

## 2022-10-18 RX ADMIN — GABAPENTIN 100 MG: 300 CAPSULE ORAL at 08:56

## 2022-10-18 RX ADMIN — SODIUM CHLORIDE, PRESERVATIVE FREE 10 ML: 5 INJECTION INTRAVENOUS at 16:03

## 2022-10-18 RX ADMIN — PANTOPRAZOLE SODIUM 40 MG: 40 TABLET, DELAYED RELEASE ORAL at 08:56

## 2022-10-18 RX ADMIN — SODIUM CHLORIDE, PRESERVATIVE FREE 10 ML: 5 INJECTION INTRAVENOUS at 05:57

## 2022-10-18 RX ADMIN — AMLODIPINE BESYLATE 5 MG: 5 TABLET ORAL at 08:55

## 2022-10-18 RX ADMIN — OXYCODONE 10 MG: 5 TABLET ORAL at 04:09

## 2022-10-18 RX ADMIN — ASPIRIN 81 MG CHEWABLE TABLET 81 MG: 81 TABLET CHEWABLE at 08:55

## 2022-10-18 RX ADMIN — CEFTRIAXONE 1 G: 1 INJECTION, POWDER, FOR SOLUTION INTRAMUSCULAR; INTRAVENOUS at 15:58

## 2022-10-18 RX ADMIN — ACETAMINOPHEN 650 MG: 325 TABLET ORAL at 23:06

## 2022-10-18 RX ADMIN — TAMSULOSIN HYDROCHLORIDE 0.4 MG: 0.4 CAPSULE ORAL at 08:55

## 2022-10-18 RX ADMIN — SODIUM CHLORIDE, PRESERVATIVE FREE 10 ML: 5 INJECTION INTRAVENOUS at 22:00

## 2022-10-18 RX ADMIN — MELATONIN 3 MG: at 21:36

## 2022-10-18 RX ADMIN — LEVOTHYROXINE SODIUM 125 MCG: 0.12 TABLET ORAL at 08:55

## 2022-10-18 RX ADMIN — ONDANSETRON 4 MG: 2 INJECTION INTRAMUSCULAR; INTRAVENOUS at 18:40

## 2022-10-18 RX ADMIN — MELATONIN 3 MG: at 00:40

## 2022-10-18 RX ADMIN — ENOXAPARIN SODIUM 30 MG: 100 INJECTION SUBCUTANEOUS at 08:56

## 2022-10-18 RX ADMIN — OXYCODONE 10 MG: 5 TABLET ORAL at 18:58

## 2022-10-18 RX ADMIN — OXYCODONE 10 MG: 5 TABLET ORAL at 12:43

## 2022-10-18 RX ADMIN — METHADONE HYDROCHLORIDE 60 MG: 10 CONCENTRATE ORAL at 11:25

## 2022-10-18 NOTE — PROGRESS NOTES
TRANSFER - OUT REPORT:    Verbal report given to Susan Stein RN (name) on Ton Zapata  being transferred to [facility] (unit) for routine progression of care       Report consisted of patients Situation, Background, Assessment and   Recommendations(SBAR). Information from the following report(s) SBAR, Kardex, ED Summary, and MAR was reviewed with the receiving nurse. Lines:   Peripheral IV 89/61/04 Right Cephalic (Active)   Site Assessment Clean, dry, & intact 10/16/22 1345   Phlebitis Assessment 0 10/16/22 1345   Infiltration Assessment 0 10/16/22 1345   Dressing Status Clean, dry, & intact 10/16/22 1345   Dressing Type Tape;Transparent 10/16/22 1345        Opportunity for questions and clarification was provided.       Patient transported with:   Monitor  O2 @ 2 liters

## 2022-10-18 NOTE — PROGRESS NOTES
-Please complete MRI History and Safety Screening Form   - Patient cannot be scanned until this form is completed, including signatures, and reviewed in MRI to ensure patient is SAFE and eligible for MRI. - CALL MRI when this has been successfully completed at [phone].

## 2022-10-18 NOTE — CONSULTS
Neurosurgery:    Here is my algorithm for compression fractures:  If no pain - then no treatment needed. If painful - then MRI and consult IR for evaluation for kyphoplasty.     Thank you

## 2022-10-18 NOTE — PROGRESS NOTES
Bedside and Verbal   shift change report given to [address] [place] [address], [facility] (oncoming nurse) by Latosha Pelaez (offgoing nurse). Report included the following information SBAR, Kardex, MAR, Accordion, and Dual Neuro Assessment.

## 2022-10-18 NOTE — WOUND CARE
Wound Care consult for several wounds that were present on admission:  Chart reviewed and patient assessed. He is well known to this wound care nurse from past care provided with regard to the necrotic leg and foot on the left. This has since that time, been amputated above the knee. Pt. 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 Pt. Has edema in the arms and legs that is chronic. Pt. Had an electric wheelchair and he \"runs into walls and door jams a lot with the arms and the right leg\", causing the skin tears. There are scars all over both arms from healing and healed wounds. There are some ecchymotic skin areas as well. Wounds present on admission:   the left buttocks wound that is open and draining with epiboled edges right next to the anal area. Skin tears on the right and left arms two that are open and an elbow scab on the right that is stable. Skin tear on the right lower leg that is large, red and full thickness to the dermal layer. This needs a silver dressing and some absorbency. The right heel pressure injury that is also dry, stable and not infected looking. No drainage and no redness. Just need to off load any pressure. Treatment: All wounds were cleansed with Caraklenz spray and wiped with gauze. Each dressing applied has been ordered for the wound care to be done by nursing. The skin tears to the arms (right and left) can be done every other day as long as the dressing can remain in place. Otherwise change daily. Date each dressing. The right lower leg deep skin tear will need to be done daily with Xeroform dressing and wrapped with radha. The buttocks wound dressing will need a daily dressing and cleaning but if he has a BM, it will need to be done again as ordered. Plan: Orders written for wound care and discussed with nursing at the bedside.  Float the right heel.   Turn patient every 1-2 hours.    Jerry Nelson RN, BSN, [employer]

## 2022-10-18 NOTE — PROGRESS NOTES
Patient is refusing MRI at this time. MD notified. MD offered bone scan. Patient states he has to think about it.

## 2022-10-19 LAB
ANION GAP SERPL CALC-SCNC: 4 MMOL/L (ref 5–15)
BUN SERPL-MCNC: 5 MG/DL (ref 6–20)
BUN/CREAT SERPL: 10 (ref 12–20)
CALCIUM SERPL-MCNC: 8.5 MG/DL (ref 8.5–10.1)
CHLORIDE SERPL-SCNC: 99 MMOL/L (ref 97–108)
CO2 SERPL-SCNC: 36 MMOL/L (ref 21–32)
CREAT SERPL-MCNC: 0.49 MG/DL (ref 0.7–1.3)
ERYTHROCYTE [DISTWIDTH] IN BLOOD BY AUTOMATED COUNT: 16.8 % (ref 11.5–14.5)
GLUCOSE SERPL-MCNC: 112 MG/DL (ref 65–100)
HCT VFR BLD AUTO: 31.1 % (ref 36.6–50.3)
HGB BLD-MCNC: 9.6 G/DL (ref 12.1–17)
MCH RBC QN AUTO: 24.7 PG (ref 26–34)
MCHC RBC AUTO-ENTMCNC: 30.9 G/DL (ref 30–36.5)
MCV RBC AUTO: 79.9 FL (ref 80–99)
NRBC # BLD: 0 K/UL (ref 0–0.01)
NRBC BLD-RTO: 0 PER 100 WBC
PLATELET # BLD AUTO: 265 K/UL (ref 150–400)
PMV BLD AUTO: 8.6 FL (ref 8.9–12.9)
POTASSIUM SERPL-SCNC: 4 MMOL/L (ref 3.5–5.1)
RBC # BLD AUTO: 3.89 M/UL (ref 4.1–5.7)
SODIUM SERPL-SCNC: 139 MMOL/L (ref 136–145)
WBC # BLD AUTO: 11.1 K/UL (ref 4.1–11.1)

## 2022-10-19 PROCEDURE — 97530 THERAPEUTIC ACTIVITIES: CPT

## 2022-10-19 PROCEDURE — 94760 N-INVAS EAR/PLS OXIMETRY 1: CPT

## 2022-10-19 PROCEDURE — 74011250637 HC RX REV CODE- 250/637: Performed by: STUDENT IN AN ORGANIZED HEALTH CARE EDUCATION/TRAINING PROGRAM

## 2022-10-19 PROCEDURE — 36415 COLL VENOUS BLD VENIPUNCTURE: CPT

## 2022-10-19 PROCEDURE — 65270000046 HC RM TELEMETRY

## 2022-10-19 PROCEDURE — 74011250636 HC RX REV CODE- 250/636: Performed by: STUDENT IN AN ORGANIZED HEALTH CARE EDUCATION/TRAINING PROGRAM

## 2022-10-19 PROCEDURE — 74011000250 HC RX REV CODE- 250: Performed by: STUDENT IN AN ORGANIZED HEALTH CARE EDUCATION/TRAINING PROGRAM

## 2022-10-19 PROCEDURE — 74011000258 HC RX REV CODE- 258: Performed by: INTERNAL MEDICINE

## 2022-10-19 PROCEDURE — 77010033678 HC OXYGEN DAILY

## 2022-10-19 PROCEDURE — 74011636637 HC RX REV CODE- 636/637: Performed by: STUDENT IN AN ORGANIZED HEALTH CARE EDUCATION/TRAINING PROGRAM

## 2022-10-19 PROCEDURE — 74011250637 HC RX REV CODE- 250/637: Performed by: GENERAL ACUTE CARE HOSPITAL

## 2022-10-19 PROCEDURE — 74011250636 HC RX REV CODE- 250/636: Performed by: INTERNAL MEDICINE

## 2022-10-19 PROCEDURE — 85027 COMPLETE CBC AUTOMATED: CPT

## 2022-10-19 PROCEDURE — 80048 BASIC METABOLIC PNL TOTAL CA: CPT

## 2022-10-19 RX ADMIN — OXYCODONE 10 MG: 5 TABLET ORAL at 14:47

## 2022-10-19 RX ADMIN — SODIUM CHLORIDE, PRESERVATIVE FREE 10 ML: 5 INJECTION INTRAVENOUS at 22:00

## 2022-10-19 RX ADMIN — SODIUM CHLORIDE, PRESERVATIVE FREE 10 ML: 5 INJECTION INTRAVENOUS at 05:08

## 2022-10-19 RX ADMIN — OXYCODONE 10 MG: 5 TABLET ORAL at 20:51

## 2022-10-19 RX ADMIN — OXYCODONE 10 MG: 5 TABLET ORAL at 01:24

## 2022-10-19 RX ADMIN — ALLOPURINOL 100 MG: 100 TABLET ORAL at 08:24

## 2022-10-19 RX ADMIN — LEVOTHYROXINE SODIUM 125 MCG: 0.12 TABLET ORAL at 08:23

## 2022-10-19 RX ADMIN — ENOXAPARIN SODIUM 30 MG: 100 INJECTION SUBCUTANEOUS at 20:52

## 2022-10-19 RX ADMIN — ACETAMINOPHEN 650 MG: 325 TABLET ORAL at 19:48

## 2022-10-19 RX ADMIN — TAMSULOSIN HYDROCHLORIDE 0.4 MG: 0.4 CAPSULE ORAL at 08:24

## 2022-10-19 RX ADMIN — ACETAMINOPHEN 650 MG: 325 TABLET ORAL at 10:40

## 2022-10-19 RX ADMIN — SODIUM CHLORIDE, PRESERVATIVE FREE 10 ML: 5 INJECTION INTRAVENOUS at 14:00

## 2022-10-19 RX ADMIN — GABAPENTIN 100 MG: 300 CAPSULE ORAL at 21:04

## 2022-10-19 RX ADMIN — ENOXAPARIN SODIUM 30 MG: 100 INJECTION SUBCUTANEOUS at 08:23

## 2022-10-19 RX ADMIN — MELATONIN 3 MG: at 20:51

## 2022-10-19 RX ADMIN — AMLODIPINE BESYLATE 5 MG: 5 TABLET ORAL at 08:24

## 2022-10-19 RX ADMIN — PREDNISONE 40 MG: 20 TABLET ORAL at 08:24

## 2022-10-19 RX ADMIN — METHADONE HYDROCHLORIDE 60 MG: 10 CONCENTRATE ORAL at 08:23

## 2022-10-19 RX ADMIN — ASPIRIN 81 MG CHEWABLE TABLET 81 MG: 81 TABLET CHEWABLE at 08:24

## 2022-10-19 RX ADMIN — GABAPENTIN 100 MG: 300 CAPSULE ORAL at 08:24

## 2022-10-19 RX ADMIN — GENTAMICIN SULFATE 440 MG: 40 INJECTION, SOLUTION INTRAMUSCULAR; INTRAVENOUS at 14:50

## 2022-10-19 RX ADMIN — GABAPENTIN 100 MG: 300 CAPSULE ORAL at 17:33

## 2022-10-19 RX ADMIN — OXYCODONE 10 MG: 5 TABLET ORAL at 23:33

## 2022-10-19 RX ADMIN — OXYCODONE 10 MG: 5 TABLET ORAL at 17:33

## 2022-10-19 RX ADMIN — PANTOPRAZOLE SODIUM 40 MG: 40 TABLET, DELAYED RELEASE ORAL at 08:24

## 2022-10-19 RX ADMIN — OXYCODONE 10 MG: 5 TABLET ORAL at 08:23

## 2022-10-19 RX ADMIN — OXYCODONE 10 MG: 5 TABLET ORAL at 05:06

## 2022-10-19 NOTE — PROGRESS NOTES
Pharmacy Antimicrobial Kinetic Dosing    Indication for Antimicrobials: UTI     Current Regimen of Each Antimicrobial:  Gentamicin 5 mg/kg/day (Start Date 10/19; Day # 1)    Previous Antimicrobial Therapy:  Ceftriaxone    Goal Level: PULSE DOSING GENTAMICIN  Peak: 15 - 20 mcg/mL  Trough: < or = 0.3 mcg/mL    Date Dose & Interval Measured (mcg/mL) Predicted AUC/ANANT                       Date & time of next level: TBD    Dosing calculator used:  None    Significant Positive Cultures:   10/16 Urine - Carbapenem resistant Klebsiella Pneumonia     Conditions for Dosing Consideration: None    Labs:  Recent Labs     10/19/22  0242 10/18/22  0416 10/17/22  0412   CREA 0.49* 0.47* 0.57*   BUN 5* 5* 5*     Recent Labs     10/19/22  0242 10/18/22  0416 10/17/22  0412   WBC 11.1 14.1* 11.1     Temp (24hrs), Av.9 °F (36.6 °C), Min:97.7 °F (36.5 °C), Max:98 °F (36.7 °C)        Creatinine Clearance (mL/min):   CrCl (Ideal Body Weight): 127.8   If actual weight < IBW: CrCl (Actual Body Weight) 169.6    Impression/Plan:   Gentamicin 5 mg/kg Q24 H for CRE (K. Pneumoniae) UTI. Gentamicin ANANT is acceptable, I have asked lab to add on ceftazidime/avibactam sensitivities  Post dose level in 14 hours. Antimicrobial stop date      Pharmacy will follow daily and adjust medications as appropriate for renal function and/or serum levels.     Thank you,  Mayelin Heck, PHARMD    Vancomycin Dosing Document    Documents located on pharmacy Teams site: Clinical Practice -> Antimicrobial Stewardship -> Antibiotics_Vancomycin     Aminoglycoside Dosing Document    Documents located on pharmacy Teams site: Clinical Practice -> Antimicrobial Stewardship -> Antibiotics_Aminoglycosides

## 2022-10-19 NOTE — PROGRESS NOTES
Problem: Self Care Deficits Care Plan (Adult)  Goal: *Acute Goals and Plan of Care (Insert Text)  Description: FUNCTIONAL STATUS PRIOR TO ADMISSION: had L AKA at [facility] 5 weeks ago, female friend has been assisting with sliding board transfers or stand pivot transfers to and from power wheelchair or BSC SBA, sponge bathing with bath wipes with assist for RLE, able to perform UB ADLS with set up, assist with toileting and LB dressing, had recently stopped using grace lift due to improved independence with transfers, going to OP PT/OT at [facility] and they were setting up aide services, pt needs a new hospital bed due to it being in disrepair, uses power chair for mobility independently with RUE as LUE is non functional from old [address]: The patient lived alone with a friend to provide assistance. Pt reports his friend has been assisting since d/c from [facility] and that she may not be able to continue to assist long term    Occupational Therapy Goals:  Initiated 10/17/2022  1. Patient will perform grooming seated with good balance with noe technique and supervision implements provided within 7 days. 2. Patient will perform upper body dressing with supervision/set-up within 7 days. 3. Patient will perform toileting with moderate assistance  within 7 days. 4. Patient will perform sponge bath seated with minimal assist within 7 days. 5. Patient will transfer from drop arm bedside commode or drop arm recliner with moderate assistance with best technique within 7 days. Outcome: Progressing Towards Goal   OCCUPATIONAL THERAPY TREATMENT  Patient: Marisa Patient (58 y.o. male)  Date: 10/19/2022  Diagnosis: COPD with acute exacerbation ([facility] [J44.1] <principal problem not specified>      Precautions: Fall, Contact (ESBL wound)  Chart, occupational therapy assessment, plan of care, and goals were reviewed.     ASSESSMENT  Patient continues with skilled OT services and is not progressing towards goals.  Limited by severe pain with bed mobility with overall SBA for supine to EOB with extensive education on log roll for back protection strategies, however patient requesting to use bedside table to use as a wedge to pull self with R LE up and R UE use of bed rail, increased pain with sitting ~30 second tolerance only upright before 10/10 pain with shooting type muscle spasms and L arm pain verbalized. Moaning and groaning intermittent. Reporting severe claustrophobia even in bed at times, HOB elevated and needs met as requested    Discussed need for MRI/testing in order to determine best plan for pain management and therapy needs, RN aware and discussed options with patient already, caregiver aware and speaking to patient on phone. Patient is not able to participate in therapy at this time, will trial to determine tx plan, pain management, and needs. Current Level of Function Impacting Discharge (ADLs): SBA for pain management with 10/10 pain    Other factors to consider for discharge: below baseline of use of sliding board for transferring         PLAN :  Patient continues to benefit from skilled intervention to address the above impairments. Continue treatment per established plan of care to address goals. Recommend with staff: log roll for back pain protection    Recommend next OT session: determine DME needs/discharge needs/if appropriate    Recommendation for discharge: (in order for the patient to meet his/her long term goals)  To be determined: SNF but patient refusing, if so recommend 24/7 A due to inability to maintain upright posture or complete self care    This discharge recommendation:  A follow-up discussion with the attending provider and/or case management is planned    IF patient discharges home will need the following DME: hospital bed and 24/7 care       SUBJECTIVE:   Patient stated I am going to die in here from this.  re: immediately alerted RN who is aware of pain    OBJECTIVE DATA SUMMARY:   Cognitive/Behavioral Status:  Neurologic State: Alert  Orientation Level: Oriented X4  Cognition: Decreased attention/concentration             Functional Mobility and Transfers for ADLs:  Bed Mobility:  Rolling: Stand-by assistance  Supine to Sit: Stand-by assistance  Sit to Supine: Stand-by assistance    Transfers:             Balance:  Sitting: High guard; Impaired  Sitting - Static: Fair (occasional)    ADL Intervention:  Feeding  Food to Mouth: Independent  Drink to Mouth: Independent              Type of Bath: Chlorhexidine (CHG)       Attempted to educate and unable to complete due to patient not agreeable due to severe pain. Requesting supine despite cues. Therapeutic Exercises:       Pain:  10/10, shooting intermittent, muscle spasms    Activity Tolerance:   Poor and requires frequent rest breaks    After treatment patient left in no apparent distress:   Supine in bed, Call bell within reach, and Side rails x 3    COMMUNICATION/COLLABORATION:   The patients plan of care was discussed with: Physical therapy assistant and Registered nurse.      Romel Mclaughlin OT  Time Calculation: 15 mins

## 2022-10-19 NOTE — PROGRESS NOTES
Patient continuing to refuse MRI.  Patient stating, \"I am claustrophobic and the medication you give me doesn't work and I want another opinion before doing anything at this hospital.\"

## 2022-10-19 NOTE — PROGRESS NOTES
Problem: Mobility Impaired (Adult and Pediatric)  Goal: *Acute Goals and Plan of Care (Insert Text)  Description: FUNCTIONAL STATUS PRIOR TO ADMISSION: Pt lives at home, had been alone but recently since amputation his friend has been staying with him. Pt pt's request, talked with friend on phone to get status PTA. She stated pt was functioning at w/c level with an electric scooter; needed SBA for either pivot transfer or sliding board transfer (does have grace but she states he did better without); needed help with dressing and uses wipes for bathing because he could not get onto shower chair any longer. She states he was going to OPPT/OT at [facility]. She states they were apparently working on getting him a care aide. Pt also states that he has a  for his L AKA (5 weeks ago) but it no longer fits. He was unable to fill in where he got it. HOME SUPPORT PRIOR TO ADMISSION: The patient lived alone with friend to provide assistance. Physical Therapy Goals  Initiated 10/17/2022  1. Patient will move from supine to sit and sit to supine , scoot up and down, and roll side to side in bed with modified independence within 7 day(s). 2.  Patient will transfer from bed to chair and chair to bed with minimal assistance/contact guard assist using the least restrictive device within 7 day(s). 3.  Patient will show good sitting balance static and dynamic for safe bed mobility and transfers within 7 days. Outcome: Progressing Towards Goal   PHYSICAL THERAPY TREATMENT  Patient: Niki Hu (25 y.o. male)  Date: 10/19/2022  Diagnosis: COPD with acute exacerbation ([facility] [J44.1] <principal problem not specified>      Precautions: Fall, Contact (ESBL wound)  Chart, physical therapy assessment, plan of care and goals were reviewed. ASSESSMENT  Patient continues with skilled PT services and is progressing towards goals. Pt presents with decreased strength and endurance.  Pt wheezing and having what seems to be muscle spasms up left arm and back. Pt educated on log roll but pt reported he can get up by himself. Pt using bedside tabel to brace foot to help come to sitting EOB. Pt with increased pain once sitting. Pt educated on that getting up that way could be increasing his back pain . Pt quickly return to supine. Pt declined getting pulling up in bed. Nurse made aware of pts pain. Current Level of Function Impacting Discharge (mobility/balance): bed mobility at supervision using bedside table. Other factors to consider for discharge: pain, decreased strength and endurance. PLAN :  Patient continues to benefit from skilled intervention to address the above impairments. Continue treatment per established plan of care. to address goals. Recommendation for discharge: (in order for the patient to meet his/her long term goals)  Rehab    This discharge recommendation:  Has been made in collaboration with the attending provider and/or case management    IF patient discharges home will need the following DME: patient owns DME required for discharge       SUBJECTIVE:   Patient stated I think this is going to take me out.     OBJECTIVE DATA SUMMARY:   Critical Behavior:  Neurologic State: Alert  Orientation Level: Oriented X4  Cognition: Decreased attention/concentration  Safety/Judgement: Fall prevention  Functional Mobility Training:  Bed Mobility:  Rolling: Stand-by assistance  Supine to Sit: Stand-by assistance  Sit to Supine: Stand-by assistance                 Balance:  Sitting: High guard; Impaired  Sitting - Static: Fair (occasional)  Sitting - Dynamic: Fair (occasional)        :     Pain Rating:  Pt with increased back pain after bed mobility     Activity Tolerance:   Poor and requires rest breaks    After treatment patient left in no apparent distress:   Supine in bed, Call bell within reach, and Side rails x 3    COMMUNICATION/COLLABORATION:   The patients plan of care was discussed with: Occupational therapist and Registered nurse.      Belle Mata, PTA   Time Calculation: 8 mins

## 2022-10-19 NOTE — PROGRESS NOTES
MRI screening form completed with patient and signed. Faxed to MRI. Versed ordered to premedicate for claustrophobia, per MRI patient cannot be administered versed unless RN can accompany patient entire time for MRI. This RN explained that the RN cannot leave the unit. MD notified for another medication and MD stated to ask the supervisor for assistance.

## 2022-10-19 NOTE — PROGRESS NOTES
Hospitalist Progress Note    NAME: Guy Schreiber   :  1965   MRN:  [id number]       Assessment / Plan:  Severe back pain-likely secondary to T12 inferior vertebral endplate compression fracture  Chronic pain on methadone  Debility secondary to CVA and recent left AKA  Chronic wounds  UTI with recent Gross  Mild COPD exacerbation  History of bladder cancer  History of gout  GERD  Essential hypertension  Hypothyroidism     PLAN:     Severe back pain-likely secondary to   T12 inferior vertebral endplate compression fracture  Chronic pain on methadone  Debility secondary to CVA and recent left AKA  Admit to telemetry monitoring  Continue PTA methadone  Oxycodone 10 mg every 4 hours as needed breakthrough pain  Started on fentanyl patch  NSR consulted  PT/OT consulted  neuroSx recs appreciated  IR consulted, recs MRI of T/L spine, pt hesitated to go for MRI even though I offered more pain meds    Chronic wounds  Wound care consulted, greatly appreciate their expertise-  no evidence of cellulitic lesions on exam     UTI with recent Gross  History of bladder cancer  Ceftriaxone daily x5 days  Continue PTA Flomax     Mild COPD exacerbation  Schedule DuoNebs every 6 hours x24 hours with every 4 hours as needed breakthrough nebs  Prednisone 40 mg daily for 5 days  Hold off on azithromycin at present given mild symptoms    History of gout  No evidence of active flare  Continue PTA allopurinol     GERD  Protonix daily     Essential hypertension  Continue PTA amlodipine, aspirin     Hypothyroidism  Continue PTA Synthroid     Incidental findings requiring outpatient follow up/ evaluation:  3 mm right lung nodule. -Guidelines recommend repeat scan in 12 months  Increase in size of intermediate density left renal lesion. Recommend  follow-up with ultrasound in 6 months. 30.0 - 39.9 Obese / Body mass index is 30.79 kg/m².     Estimated discharge date:     Code status: Full  Prophylaxis: Lovenox  Recommended Disposition:  tbd     Subjective:     Chief Complaint / Reason for Physician Visit  \"Back pain\". Discussed with RN events overnight. Patient says bed is uncomfortable, and back pain is not controlled. Review of Systems:  Symptom Y/N Comments  Symptom Y/N Comments   Fever/Chills    Chest Pain n    Poor Appetite    Edema     Cough    Abdominal Pain n    Sputum    Joint Pain     SOB/BECK n   Pruritis/Rash     Nausea/vomit n   Tolerating PT/OT     Diarrhea    Tolerating Diet     Constipation    Other       Could NOT obtain due to:      Objective:     VITALS:   Last 24hrs VS reviewed since prior progress note. Most recent are:  Patient Vitals for the past 24 hrs:   Temp Pulse Resp BP SpO2   10/18/22 2000 -- 92 -- -- --   10/18/22 1840 -- -- -- (!) 145/98 --   10/18/22 1801 98 °F (36.7 °C) (!) 121 20 (!) 202/119 97 %   10/18/22 1203 97.9 °F (36.6 °C) 98 20 -- 97 %   10/18/22 0852 97.9 °F (36.6 °C) 98 20 (!) 151/96 92 %       No intake or output data in the 24 hours ending 10/19/22 0607       I had a face to face encounter and independently examined this patient  as outlined below:  PHYSICAL EXAM:  General: Alert, cooperative, appears stated age  Resp:  CTA bilaterally, no wheezing or rales. No accessory muscle use  CV:  Regular  rhythm,  No edema  GI:  Soft, Non distended, Non tender.   +Bowel sounds  Neurologic:  Alert and oriented X 3, normal speech, left side weakness  Psych:    Not anxious nor agitated  Skin:  Skin tears and wounds in various stages of healing on extremities  Extremities      Left AKA    Reviewed most current lab test results and cultures  YES  Reviewed most current radiology test results   YES  Review and summation of old records today    NO  Reviewed patient's current orders and MAR    YES  PMH/SH reviewed - no change compared to H&P  ________________________________________________________________________  Care Plan discussed with:    Comments   Patient x    Family      RN x    Care Manager x Consultant                       x Multidiciplinary team rounds were held today with , nursing, pharmacist and clinical coordinator. Patient's plan of care was discussed; medications were reviewed and discharge planning was addressed. ________________________________________________________________________  Total NON critical care TIME:  40   Minutes    Total CRITICAL CARE TIME Spent:   Minutes non procedure based      Comments   >50% of visit spent in counseling and coordination of care x    ________________________________________________________________________  Matilda Borges MD     Procedures: see electronic medical records for all procedures/Xrays and details which were not copied into this note but were reviewed prior to creation of Plan. LABS:  I reviewed today's most current labs and imaging studies.   Pertinent labs include:  Recent Labs     10/19/22  0242 10/18/22  0416 10/17/22  0412   WBC 11.1 14.1* 11.1   HGB 9.6* 9.9* 10.3*   HCT 31.1* 32.0* 34.0*    298 270       Recent Labs     10/19/22  0242 10/18/22  0416 10/17/22  0412 10/16/22  1344    140 142 139   K 4.0 4.1 3.6 3.5   CL 99 101 107 104   CO2 36* 31 30 27   * 111* 100 78   BUN 5* 5* 5* 6   CREA 0.49* 0.47* 0.57* 0.66*   CA 8.5 8.8 8.3* 8.3*   ALB  --   --   --  3.0*   TBILI  --   --   --  0.3   ALT  --   --   --  16         Signed: Matilda Borges MD

## 2022-10-19 NOTE — PROGRESS NOTES
Transition of Care Plan:    RUR: 17%  Disposition: home - declining rehab (friend may be able to discuss further) - agreeable to home health services but does no have a PCP to follow   Follow up appointments: PCP/specilist  DME needed:has hospital bed, electric scooter, BSC, sliding board, grace per PT notes  Transportation at Discharge: medicaid medical transport  Becky Mayur or means to access home:    per patient    IM Medicare Letter:n/a  Is patient a  and connected with the [place]? N/a  If yes, was [place] transfer form completed and [facility] notified? Caregiver Contact:Venkata Larsen Edith friend [phone]  Discharge Caregiver contacted prior to discharge? Per patient  Care Conference needed?:          no    Reason for Admission:   back pain, pt recent AKA at [facility]                    RUR Score:     17%             PCP: First and Last name:   None    Patient does not have PCP - states Cindy Quevedo friend is working on securing a PCP     Name of Practice:    Are you a current patient: Yes/No:    Approximate date of last visit:    Can you participate in a virtual visit if needed:     Do you (patient/family) have any concerns for transition/discharge?      Requests CM to speak with Venkata to secure increased support at home              Plan for utilizing home health:    agreeable to home care services but needs a PCP    Current Advanced Directive/Advance Care Plan:  Full Code      Healthcare Decision Maker:   Click here to complete [address] including selection of the Healthcare Decision Maker Relationship (ie \"Primary\")            Primary Decision Maker: Dallin Valero - Other Relative - [phone]    Transition of Care Plan:          CM met with patient - lives alone at [place] [address] cell# [phone] - lives alone- states Rodney Canales is his  helping him at this time with getting him support at home - patient admantenly declines IPR/SNF rehab at [facility] time - is agreeable to home care services but patient would need to secure a PCP - uses [facility] on [address] for his meds- CM to continue to follow. Will need medical transport via stretcher upon discharge. JUVENCIO Delacruz             CM spoke with Amadou Oh friend - states patient has a new PCP appt on Nov 1st and will provide this info to CM - patient needs to have [address] to  his old bed as she states the rental has ended- states they have been trying to get personal care aide services with [facility] - explained that it is anticipated kj may need rehab LOC and he is declining at this time - CM unable to order DME until patient completes workup MRI (which friend is going to talk to patient to complete) ? kyphplasty and further PT/OT afterwards - patient has had [facility] in the past but does not want their services again - Amadou Oh friend to call CM with PCP contact info- CM will continue to follow.   JUVENCIO Delacruz

## 2022-10-19 NOTE — ROUTINE PROCESS

## 2022-10-20 LAB
ANION GAP SERPL CALC-SCNC: 3 MMOL/L (ref 5–15)
BACTERIA SPEC CULT: ABNORMAL
BUN SERPL-MCNC: 6 MG/DL (ref 6–20)
BUN/CREAT SERPL: 13 (ref 12–20)
CALCIUM SERPL-MCNC: 8.9 MG/DL (ref 8.5–10.1)
CC UR VC: ABNORMAL
CHLORIDE SERPL-SCNC: 100 MMOL/L (ref 97–108)
CO2 SERPL-SCNC: 37 MMOL/L (ref 21–32)
CREAT SERPL-MCNC: 0.48 MG/DL (ref 0.7–1.3)
DATE LAST DOSE: ABNORMAL
DATE LAST DOSE: NORMAL
ERYTHROCYTE [DISTWIDTH] IN BLOOD BY AUTOMATED COUNT: 16.6 % (ref 11.5–14.5)
GENTAMICIN PEAK SERPL-MCNC: <0.2 UG/ML (ref 5–10)
GENTAMICIN SERPL-MCNC: 1.6 UG/ML
GLUCOSE SERPL-MCNC: 97 MG/DL (ref 65–100)
HCT VFR BLD AUTO: 30.7 % (ref 36.6–50.3)
HGB BLD-MCNC: 9.3 G/DL (ref 12.1–17)
MCH RBC QN AUTO: 24.5 PG (ref 26–34)
MCHC RBC AUTO-ENTMCNC: 30.3 G/DL (ref 30–36.5)
MCV RBC AUTO: 81 FL (ref 80–99)
NRBC # BLD: 0 K/UL (ref 0–0.01)
NRBC BLD-RTO: 0 PER 100 WBC
PLATELET # BLD AUTO: 274 K/UL (ref 150–400)
PMV BLD AUTO: 8.5 FL (ref 8.9–12.9)
POTASSIUM SERPL-SCNC: 3.8 MMOL/L (ref 3.5–5.1)
RBC # BLD AUTO: 3.79 M/UL (ref 4.1–5.7)
REPORTED DOSE,DOSE: ABNORMAL UNITS
REPORTED DOSE,DOSE: NORMAL UNITS
REPORTED DOSE/TIME,TMG: ABNORMAL
REPORTED DOSE/TIME,TMG: NORMAL
SERVICE CMNT-IMP: ABNORMAL
SODIUM SERPL-SCNC: 140 MMOL/L (ref 136–145)
WBC # BLD AUTO: 11.3 K/UL (ref 4.1–11.1)

## 2022-10-20 PROCEDURE — 80170 ASSAY OF GENTAMICIN: CPT

## 2022-10-20 PROCEDURE — 74011250637 HC RX REV CODE- 250/637: Performed by: INTERNAL MEDICINE

## 2022-10-20 PROCEDURE — 36415 COLL VENOUS BLD VENIPUNCTURE: CPT

## 2022-10-20 PROCEDURE — 65270000046 HC RM TELEMETRY

## 2022-10-20 PROCEDURE — 94760 N-INVAS EAR/PLS OXIMETRY 1: CPT

## 2022-10-20 PROCEDURE — 74011000250 HC RX REV CODE- 250: Performed by: STUDENT IN AN ORGANIZED HEALTH CARE EDUCATION/TRAINING PROGRAM

## 2022-10-20 PROCEDURE — 74011636637 HC RX REV CODE- 636/637: Performed by: STUDENT IN AN ORGANIZED HEALTH CARE EDUCATION/TRAINING PROGRAM

## 2022-10-20 PROCEDURE — 85027 COMPLETE CBC AUTOMATED: CPT

## 2022-10-20 PROCEDURE — 74011250636 HC RX REV CODE- 250/636: Performed by: STUDENT IN AN ORGANIZED HEALTH CARE EDUCATION/TRAINING PROGRAM

## 2022-10-20 PROCEDURE — 80048 BASIC METABOLIC PNL TOTAL CA: CPT

## 2022-10-20 PROCEDURE — 74011250637 HC RX REV CODE- 250/637: Performed by: STUDENT IN AN ORGANIZED HEALTH CARE EDUCATION/TRAINING PROGRAM

## 2022-10-20 PROCEDURE — 74011250636 HC RX REV CODE- 250/636: Performed by: INTERNAL MEDICINE

## 2022-10-20 PROCEDURE — 74011000258 HC RX REV CODE- 258: Performed by: INTERNAL MEDICINE

## 2022-10-20 PROCEDURE — 77010033678 HC OXYGEN DAILY

## 2022-10-20 PROCEDURE — 74011250637 HC RX REV CODE- 250/637: Performed by: GENERAL ACUTE CARE HOSPITAL

## 2022-10-20 RX ORDER — GABAPENTIN 100 MG/1
100 CAPSULE ORAL ONCE
Status: COMPLETED | OUTPATIENT
Start: 2022-10-21 | End: 2022-10-21

## 2022-10-20 RX ADMIN — OXYCODONE 10 MG: 5 TABLET ORAL at 18:39

## 2022-10-20 RX ADMIN — GABAPENTIN 100 MG: 300 CAPSULE ORAL at 08:16

## 2022-10-20 RX ADMIN — PANTOPRAZOLE SODIUM 40 MG: 40 TABLET, DELAYED RELEASE ORAL at 08:16

## 2022-10-20 RX ADMIN — PREDNISONE 40 MG: 20 TABLET ORAL at 08:16

## 2022-10-20 RX ADMIN — OXYCODONE 10 MG: 5 TABLET ORAL at 04:18

## 2022-10-20 RX ADMIN — ASPIRIN 81 MG CHEWABLE TABLET 81 MG: 81 TABLET CHEWABLE at 08:17

## 2022-10-20 RX ADMIN — OXYCODONE 10 MG: 5 TABLET ORAL at 12:07

## 2022-10-20 RX ADMIN — OXYCODONE 10 MG: 5 TABLET ORAL at 21:37

## 2022-10-20 RX ADMIN — ALLOPURINOL 100 MG: 100 TABLET ORAL at 08:16

## 2022-10-20 RX ADMIN — ENOXAPARIN SODIUM 30 MG: 100 INJECTION SUBCUTANEOUS at 08:17

## 2022-10-20 RX ADMIN — GENTAMICIN SULFATE 440 MG: 40 INJECTION, SOLUTION INTRAMUSCULAR; INTRAVENOUS at 14:58

## 2022-10-20 RX ADMIN — TAMSULOSIN HYDROCHLORIDE 0.4 MG: 0.4 CAPSULE ORAL at 08:16

## 2022-10-20 RX ADMIN — GABAPENTIN 100 MG: 300 CAPSULE ORAL at 21:37

## 2022-10-20 RX ADMIN — AMLODIPINE BESYLATE 5 MG: 5 TABLET ORAL at 08:16

## 2022-10-20 RX ADMIN — LEVOTHYROXINE SODIUM 125 MCG: 0.12 TABLET ORAL at 08:16

## 2022-10-20 RX ADMIN — GABAPENTIN 100 MG: 300 CAPSULE ORAL at 15:11

## 2022-10-20 RX ADMIN — OXYCODONE 10 MG: 5 TABLET ORAL at 08:16

## 2022-10-20 RX ADMIN — ACETAMINOPHEN 650 MG: 325 TABLET ORAL at 21:36

## 2022-10-20 RX ADMIN — METHADONE HYDROCHLORIDE 60 MG: 10 CONCENTRATE ORAL at 08:17

## 2022-10-20 RX ADMIN — SODIUM CHLORIDE, PRESERVATIVE FREE 10 ML: 5 INJECTION INTRAVENOUS at 06:00

## 2022-10-20 RX ADMIN — OXYCODONE 10 MG: 5 TABLET ORAL at 15:11

## 2022-10-20 RX ADMIN — SODIUM CHLORIDE, PRESERVATIVE FREE 10 ML: 5 INJECTION INTRAVENOUS at 21:44

## 2022-10-20 RX ADMIN — SODIUM CHLORIDE, PRESERVATIVE FREE 10 ML: 5 INJECTION INTRAVENOUS at 14:00

## 2022-10-20 NOTE — PROGRESS NOTES
Pharmacy Antimicrobial Kinetic Dosing    Indication for Antimicrobials: UTI     Current Regimen of Each Antimicrobial:  Gentamicin 5 mg/kg/day (Start Date 10/19; Day # 2)    Previous Antimicrobial Therapy:  Ceftriaxone    Goal Level: PULSE DOSING GENTAMICIN  Peak: 15 - 20 mcg/mL  Trough: < or = 0.3 mcg/mL    Date Dose & Interval Measured (mcg/mL) Predicted AUC/ANANT   10/20 440 mg IVQ24 1.6 (13 hours post dose) N/A                 Date & time of next level: TBD    Dosing calculator used:  None    Significant Positive Cultures:   10/16 Urine - Carbapenem resistant Klebsiella Pneumonia     Conditions for Dosing Consideration: None    Labs:  Recent Labs     10/19/22  0242 10/18/22  0416 10/17/22  0412   CREA 0.49* 0.47* 0.57*   BUN 5* 5* 5*     Recent Labs     10/19/22  0242 10/18/22  0416 10/17/22  0412   WBC 11.1 14.1* 11.1     Temp (24hrs), Av.9 °F (36.6 °C), Min:97.7 °F (36.5 °C), Max:98 °F (36.7 °C)        Creatinine Clearance (mL/min):   CrCl (Ideal Body Weight): 127.8   If actual weight < IBW: CrCl (Actual Body Weight) 169.6    Impression/Plan:   Gentamicin 5 mg/kg Q24 H for CRE (K. Pneumoniae) UTI. Gentamicin ANANT is acceptable, I have asked lab to add on ceftazidime/avibactam sensitivities  13 hour post dose level is in range <2 mcg/ml  Antimicrobial stop date TBD (Likely needs a minimum of 7 days)     Pharmacy will follow daily and adjust medications as appropriate for renal function and/or serum levels.     Thank you,  Andre Stokes, PHARMD    Vancomycin Dosing Document    Documents located on pharmacy Teams site: Clinical Practice -> Antimicrobial Stewardship -> Antibiotics_Vancomycin     Aminoglycoside Dosing Document    Documents located on pharmacy Teams site: Clinical Practice -> Antimicrobial Stewardship -> Antibiotics_Aminoglycosides

## 2022-10-20 NOTE — PROGRESS NOTES
Hospitalist Progress Note    NAME: Earl Desai   :  1965   MRN:  [id number]     Earl Desai is a 61 y.o.  male with past medical history as listed below who presents with complaints of severe back pain reportedly under shoulder blades reportedly 10/10 pain that is constant/aching as well as mild worsening of baseline shortness of breath secondary to underlying COPD. Assessment / Plan:  T12 inferior vertebral endplate compression fracture  Acute low back pain due to above  -CT abdomen shows possibility of T12 inferior vertebral endplate compression fracture. Pending MRI. -Patient reports that he will not be able to tolerate MRI except under sedation  -We will consult anesthesia for MRI under sedation  -Pain control. Continue PT OT    Carbapenem resistant urinary tract infection  History of recent Gross catheter and also straight cath  -Urine culture grew Carbapenem resistant Klebsiella pneumonia  -Based on urine culture and sensitivity, currently on gentamicin  -May need IV gentamicin upon discharge      Chronic pain on methadone  -Continue home methadone      Debility secondary to CVA and recent left AKA  -Continue PT OT    Chronic wounds  -Continue wound care       Mild COPD exacerbation  Schedule DuoNebs every 6 hours x24 hours with every 4 hours as needed breakthrough nebs  Prednisone 40 mg daily for 5 days  Hold off on azithromycin at present given mild symptoms    History of gout  No evidence of active flare  Continue PTA allopurinol     GERD  Protonix daily     Essential hypertension  Continue PTA amlodipine, aspirin     Hypothyroidism  Continue PTA Synthroid     Incidental findings requiring outpatient follow up/ evaluation:  3 mm right lung nodule. -Guidelines recommend repeat scan in 12 months  Increase in size of intermediate density left renal lesion. Recommend  follow-up with ultrasound in 6 months. Left AKA    30.0 - 39.9 Obese / Body mass index is 30.79 kg/m².     Estimated discharge date: 10/22  Barriers: MRI, kyphoplasty    Code status: Full  Prophylaxis: Lovenox  Recommended Disposition:  tbd     Subjective:   Continues to report low back pain which is worse with movements and without any relieving factors. He wants MRI to be done under sedation  No fever  No diarrhea  Overnight events noted            Objective:     VITALS:   Last 24hrs VS reviewed since prior progress note. Most recent are:  Patient Vitals for the past 24 hrs:   Temp Pulse Resp BP SpO2   10/20/22 1511 -- -- -- (!) 160/110 --   10/20/22 0806 98.1 °F (36.7 °C) 86 15 124/85 97 %         Intake/Output Summary (Last 24 hours) at 10/20/2022 1537  Last data filed at 10/20/2022 1423  Gross per 24 hour   Intake --   Output 550 ml   Net -550 ml          I had a face to face encounter and independently examined this patient  as outlined below:  PHYSICAL EXAM:  General: Alert, cooperative, appears stated age  Resp:  CTA bilaterally, no wheezing or rales. No accessory muscle use  CV:  Regular  rhythm,  No edema  GI:  Soft, Non distended, Non tender. +Bowel sounds  Neurologic:  Alert and oriented X 3, normal speech, left side weakness, sensations are intact, pupils are reactive bilaterally  Psych:    Not anxious nor agitated  Skin:  Skin tears and wounds in various stages of healing on extremities  Extremities      Left AKA    Reviewed most current lab test results and cultures  YES  Reviewed most current radiology test results   YES  Review and summation of old records today    NO  Reviewed patient's current orders and MAR    YES  PMH/ reviewed - no change compared to H&P  ________________________________________________________________________  Care Plan discussed with:    Comments   Patient x    Family      RN x    Care Manager     Consultant                        Multidiciplinary team rounds were held today with , nursing, pharmacist and clinical coordinator.   Patient's plan of care was discussed; medications were reviewed and discharge planning was addressed. ________________________________________________________________________        Comments   >50% of visit spent in counseling and coordination of care x    ________________________________________________________________________  Opal Godinez MD     Procedures: see electronic medical records for all procedures/Xrays and details which were not copied into this note but were reviewed prior to creation of Plan. LABS:  I reviewed today's most current labs and imaging studies.   Pertinent labs include:  Recent Labs     10/20/22  0414 10/19/22  0242 10/18/22  0416   WBC 11.3* 11.1 14.1*   HGB 9.3* 9.6* 9.9*   HCT 30.7* 31.1* 32.0*    265 298       Recent Labs     10/20/22  0414 10/19/22  0242 10/18/22  0416    139 140   K 3.8 4.0 4.1    99 101   CO2 37* 36* 31   GLU 97 112* 111*   BUN 6 5* 5*   CREA 0.48* 0.49* 0.47*   CA 8.9 8.5 8.8         Signed: Opal Godinez MD

## 2022-10-21 LAB
ANION GAP SERPL CALC-SCNC: 4 MMOL/L (ref 5–15)
BUN SERPL-MCNC: 6 MG/DL (ref 6–20)
BUN/CREAT SERPL: 12 (ref 12–20)
CALCIUM SERPL-MCNC: 8.8 MG/DL (ref 8.5–10.1)
CHLORIDE SERPL-SCNC: 97 MMOL/L (ref 97–108)
CO2 SERPL-SCNC: 36 MMOL/L (ref 21–32)
CREAT SERPL-MCNC: 0.49 MG/DL (ref 0.7–1.3)
DATE LAST DOSE: ABNORMAL
ERYTHROCYTE [DISTWIDTH] IN BLOOD BY AUTOMATED COUNT: 16.7 % (ref 11.5–14.5)
GENTAMICIN PEAK SERPL-MCNC: 0.3 UG/ML (ref 5–10)
GLUCOSE SERPL-MCNC: 105 MG/DL (ref 65–100)
HCT VFR BLD AUTO: 32 % (ref 36.6–50.3)
HGB BLD-MCNC: 9.7 G/DL (ref 12.1–17)
MCH RBC QN AUTO: 24.4 PG (ref 26–34)
MCHC RBC AUTO-ENTMCNC: 30.3 G/DL (ref 30–36.5)
MCV RBC AUTO: 80.4 FL (ref 80–99)
NRBC # BLD: 0 K/UL (ref 0–0.01)
NRBC BLD-RTO: 0 PER 100 WBC
PLATELET # BLD AUTO: 283 K/UL (ref 150–400)
PMV BLD AUTO: 8.5 FL (ref 8.9–12.9)
POTASSIUM SERPL-SCNC: 3.9 MMOL/L (ref 3.5–5.1)
RBC # BLD AUTO: 3.98 M/UL (ref 4.1–5.7)
REPORTED DOSE,DOSE: ABNORMAL UNITS
REPORTED DOSE/TIME,TMG: ABNORMAL
SODIUM SERPL-SCNC: 137 MMOL/L (ref 136–145)
WBC # BLD AUTO: 12.5 K/UL (ref 4.1–11.1)

## 2022-10-21 PROCEDURE — 74011250636 HC RX REV CODE- 250/636: Performed by: INTERNAL MEDICINE

## 2022-10-21 PROCEDURE — 74011250637 HC RX REV CODE- 250/637: Performed by: INTERNAL MEDICINE

## 2022-10-21 PROCEDURE — 80048 BASIC METABOLIC PNL TOTAL CA: CPT

## 2022-10-21 PROCEDURE — 65270000046 HC RM TELEMETRY

## 2022-10-21 PROCEDURE — 80170 ASSAY OF GENTAMICIN: CPT

## 2022-10-21 PROCEDURE — 74011636637 HC RX REV CODE- 636/637: Performed by: STUDENT IN AN ORGANIZED HEALTH CARE EDUCATION/TRAINING PROGRAM

## 2022-10-21 PROCEDURE — 74011000258 HC RX REV CODE- 258: Performed by: INTERNAL MEDICINE

## 2022-10-21 PROCEDURE — 94760 N-INVAS EAR/PLS OXIMETRY 1: CPT

## 2022-10-21 PROCEDURE — 74011250636 HC RX REV CODE- 250/636: Performed by: STUDENT IN AN ORGANIZED HEALTH CARE EDUCATION/TRAINING PROGRAM

## 2022-10-21 PROCEDURE — 74011250636 HC RX REV CODE- 250/636: Performed by: NURSE PRACTITIONER

## 2022-10-21 PROCEDURE — 77010033678 HC OXYGEN DAILY

## 2022-10-21 PROCEDURE — 74011250637 HC RX REV CODE- 250/637: Performed by: GENERAL ACUTE CARE HOSPITAL

## 2022-10-21 PROCEDURE — 36415 COLL VENOUS BLD VENIPUNCTURE: CPT

## 2022-10-21 PROCEDURE — 85027 COMPLETE CBC AUTOMATED: CPT

## 2022-10-21 PROCEDURE — 74011000250 HC RX REV CODE- 250: Performed by: STUDENT IN AN ORGANIZED HEALTH CARE EDUCATION/TRAINING PROGRAM

## 2022-10-21 PROCEDURE — 74011250637 HC RX REV CODE- 250/637: Performed by: NURSE PRACTITIONER

## 2022-10-21 PROCEDURE — 74011250636 HC RX REV CODE- 250/636: Performed by: GENERAL ACUTE CARE HOSPITAL

## 2022-10-21 PROCEDURE — 74011250637 HC RX REV CODE- 250/637: Performed by: STUDENT IN AN ORGANIZED HEALTH CARE EDUCATION/TRAINING PROGRAM

## 2022-10-21 RX ORDER — DOXAZOSIN 2 MG/1
2 TABLET ORAL DAILY
Status: DISCONTINUED | OUTPATIENT
Start: 2022-10-22 | End: 2022-11-20 | Stop reason: HOSPADM

## 2022-10-21 RX ORDER — METFORMIN HYDROCHLORIDE 500 MG/1
500 TABLET, EXTENDED RELEASE ORAL DAILY
COMMUNITY
Start: 2022-09-27

## 2022-10-21 RX ORDER — DOXAZOSIN 2 MG/1
2 TABLET ORAL DAILY
COMMUNITY

## 2022-10-21 RX ORDER — HYDROMORPHONE HYDROCHLORIDE 1 MG/ML
0.5 INJECTION, SOLUTION INTRAMUSCULAR; INTRAVENOUS; SUBCUTANEOUS ONCE
Status: COMPLETED | OUTPATIENT
Start: 2022-10-21 | End: 2022-10-21

## 2022-10-21 RX ORDER — PANTOPRAZOLE SODIUM 40 MG/1
40 TABLET, DELAYED RELEASE ORAL DAILY
COMMUNITY

## 2022-10-21 RX ORDER — PREDNISONE 20 MG/1
20 TABLET ORAL DAILY
COMMUNITY
Start: 2022-09-27 | End: 2022-11-20

## 2022-10-21 RX ORDER — CARVEDILOL 6.25 MG/1
6.25 TABLET ORAL 2 TIMES DAILY WITH MEALS
COMMUNITY

## 2022-10-21 RX ORDER — PANTOPRAZOLE SODIUM 40 MG/1
40 TABLET, DELAYED RELEASE ORAL DAILY
Status: DISCONTINUED | OUTPATIENT
Start: 2022-10-22 | End: 2022-10-21 | Stop reason: SDUPTHER

## 2022-10-21 RX ORDER — CARVEDILOL 6.25 MG/1
6.25 TABLET ORAL 2 TIMES DAILY WITH MEALS
Status: DISCONTINUED | OUTPATIENT
Start: 2022-10-21 | End: 2022-10-25

## 2022-10-21 RX ADMIN — GABAPENTIN 100 MG: 300 CAPSULE ORAL at 08:45

## 2022-10-21 RX ADMIN — ACETAMINOPHEN 650 MG: 325 TABLET ORAL at 03:37

## 2022-10-21 RX ADMIN — METHADONE HYDROCHLORIDE 60 MG: 10 CONCENTRATE ORAL at 08:44

## 2022-10-21 RX ADMIN — SENNOSIDES AND DOCUSATE SODIUM 1 TABLET: 50; 8.6 TABLET ORAL at 08:45

## 2022-10-21 RX ADMIN — OXYCODONE 10 MG: 5 TABLET ORAL at 09:37

## 2022-10-21 RX ADMIN — GABAPENTIN 100 MG: 300 CAPSULE ORAL at 21:49

## 2022-10-21 RX ADMIN — ALLOPURINOL 100 MG: 100 TABLET ORAL at 08:45

## 2022-10-21 RX ADMIN — SODIUM CHLORIDE, PRESERVATIVE FREE 10 ML: 5 INJECTION INTRAVENOUS at 22:14

## 2022-10-21 RX ADMIN — OXYCODONE 10 MG: 5 TABLET ORAL at 23:19

## 2022-10-21 RX ADMIN — ASPIRIN 81 MG CHEWABLE TABLET 81 MG: 81 TABLET CHEWABLE at 08:45

## 2022-10-21 RX ADMIN — HYDROMORPHONE HYDROCHLORIDE 0.5 MG: 1 INJECTION, SOLUTION INTRAMUSCULAR; INTRAVENOUS; SUBCUTANEOUS at 22:14

## 2022-10-21 RX ADMIN — HYDRALAZINE HYDROCHLORIDE 10 MG: 20 INJECTION INTRAMUSCULAR; INTRAVENOUS at 05:27

## 2022-10-21 RX ADMIN — GENTAMICIN SULFATE 440 MG: 40 INJECTION, SOLUTION INTRAMUSCULAR; INTRAVENOUS at 15:01

## 2022-10-21 RX ADMIN — OXYCODONE 10 MG: 5 TABLET ORAL at 20:01

## 2022-10-21 RX ADMIN — TAMSULOSIN HYDROCHLORIDE 0.4 MG: 0.4 CAPSULE ORAL at 08:45

## 2022-10-21 RX ADMIN — GABAPENTIN 100 MG: 300 CAPSULE ORAL at 15:01

## 2022-10-21 RX ADMIN — OXYCODONE 10 MG: 5 TABLET ORAL at 16:30

## 2022-10-21 RX ADMIN — CARVEDILOL 6.25 MG: 6.25 TABLET, FILM COATED ORAL at 16:52

## 2022-10-21 RX ADMIN — GABAPENTIN 100 MG: 100 CAPSULE ORAL at 00:35

## 2022-10-21 RX ADMIN — PANTOPRAZOLE SODIUM 40 MG: 40 TABLET, DELAYED RELEASE ORAL at 06:33

## 2022-10-21 RX ADMIN — OXYCODONE 10 MG: 5 TABLET ORAL at 00:36

## 2022-10-21 RX ADMIN — PREDNISONE 40 MG: 20 TABLET ORAL at 08:45

## 2022-10-21 RX ADMIN — SODIUM CHLORIDE, PRESERVATIVE FREE 10 ML: 5 INJECTION INTRAVENOUS at 15:07

## 2022-10-21 RX ADMIN — OXYCODONE 10 MG: 5 TABLET ORAL at 06:33

## 2022-10-21 RX ADMIN — OXYCODONE 10 MG: 5 TABLET ORAL at 13:09

## 2022-10-21 RX ADMIN — OXYCODONE 10 MG: 5 TABLET ORAL at 03:36

## 2022-10-21 RX ADMIN — ONDANSETRON 4 MG: 4 TABLET, ORALLY DISINTEGRATING ORAL at 22:43

## 2022-10-21 RX ADMIN — AMLODIPINE BESYLATE 5 MG: 5 TABLET ORAL at 08:45

## 2022-10-21 RX ADMIN — SODIUM CHLORIDE, PRESERVATIVE FREE 10 ML: 5 INJECTION INTRAVENOUS at 05:30

## 2022-10-21 RX ADMIN — LEVOTHYROXINE SODIUM 125 MCG: 0.12 TABLET ORAL at 06:33

## 2022-10-21 NOTE — PROGRESS NOTES
Received notification from bedside RN about patient with regards to: phantom limb pain, has all PRN pain medication given and not yet due for next dose, requesting medication for relief  VS: /90, HR 84, RR 16, O2 sat 95% on NC 2 L    Intervention given:  Additional 100 mg Neurontin PO x 1 dose ordered

## 2022-10-21 NOTE — PROGRESS NOTES
Hospitalist Progress Note    NAME: Romy Mack   :  1965   MRN:  [id number]     Romy Mack is a 61 y.o.  male with past medical history as listed below who presents with complaints of severe back pain reportedly under shoulder blades reportedly 10/10 pain that is constant/aching as well as mild worsening of baseline shortness of breath secondary to underlying COPD. Assessment / Plan:  T12 inferior vertebral endplate compression fracture  Acute low back pain due to above  -CT abdomen shows possibility of T12 inferior vertebral endplate compression fracture. Pending MRI. -Patient reports that he will not be able to tolerate MRI except under sedation  -Anesthesia consulted for MRI under sedation  -Discussed with Dr. Saida Robledo.  -Waiting on MRI    Carbapenem resistant urinary tract infection  History of recent Gross catheter and also straight cath  -Urine culture grew Carbapenem resistant Klebsiella pneumonia  -Based on urine culture and sensitivity, currently on gentamicin  -May need IV gentamicin upon discharge      Chronic pain on methadone  -Continue home methadone      Debility secondary to CVA and recent left AKA  -Continue PT OT    Chronic wounds  -Continue wound care       Mild COPD exacerbation  Schedule DuoNebs every 6 hours x24 hours with every 4 hours as needed breakthrough nebs  S/p prednisone  Hold off on azithromycin at present given mild symptoms    History of gout  No evidence of active flare  Continue PTA allopurinol     GERD  Protonix daily     Essential hypertension  Continue PTA Coreg     Hypothyroidism  Continue PTA Synthroid     Incidental findings requiring outpatient follow up/ evaluation:  3 mm right lung nodule. -Guidelines recommend repeat scan in 12 months  Increase in size of intermediate density left renal lesion. Recommend  follow-up with ultrasound in 6 months. Left AKA    30.0 - 39.9 Obese / Body mass index is 30.79 kg/m².     Estimated discharge date: 10/22  Barriers: MRI, kyphoplasty    Code status: Full  Prophylaxis: Lovenox  Recommended Disposition:  tbd     Subjective:   Still reports some back pain. No dysuria urgency  No fever  Overnight events noted and discussed with nursing            Objective:     VITALS:   Last 24hrs VS reviewed since prior progress note. Most recent are:  Patient Vitals for the past 24 hrs:   Temp Pulse Resp BP SpO2   10/21/22 1606 97.6 °F (36.4 °C) 90 20 (!) 160/84 97 %   10/21/22 0842 97.4 °F (36.3 °C) 85 18 (!) 147/97 98 %   10/21/22 0627 -- 74 -- (!) 150/94 --   10/21/22 0518 99.3 °F (37.4 °C) 86 20 (!) 187/112 95 %   10/20/22 2010 98.3 °F (36.8 °C) 84 16 (!) 179/90 95 %         Intake/Output Summary (Last 24 hours) at 10/21/2022 1629  Last data filed at 10/21/2022 1544  Gross per 24 hour   Intake --   Output 1350 ml   Net -1350 ml          I had a face to face encounter and independently examined this patient  as outlined below:  PHYSICAL EXAM:  General: Alert, cooperative, appears stated age  Resp:  CTA bilaterally, no wheezing or rales. No accessory muscle use  CV:  Regular  rhythm,  No edema  GI:  Soft, Non distended, Non tender.   +Bowel sounds  Neurologic:  Alert and oriented X 3, normal speech, left side weakness, sensations are intact, pupils are reactive bilaterally  Psych:    Not anxious nor agitated  Skin:  Skin tears and wounds in various stages of healing on extremities  Extremities      Left AKA    Reviewed most current lab test results and cultures  YES  Reviewed most current radiology test results   YES  Review and summation of old records today    NO  Reviewed patient's current orders and MAR    YES  PMH/SH reviewed - no change compared to H&P  ________________________________________________________________________  Care Plan discussed with:    Comments   Patient x    Family      RN x    Care Manager     Consultant                        Multidiciplinary team rounds were held today with , nursing, pharmacist and clinical coordinator. Patient's plan of care was discussed; medications were reviewed and discharge planning was addressed. ________________________________________________________________________        Comments   >50% of visit spent in counseling and coordination of care x    ________________________________________________________________________  Gwendolynn Dakins, MD     Procedures: see electronic medical records for all procedures/Xrays and details which were not copied into this note but were reviewed prior to creation of Plan. LABS:  I reviewed today's most current labs and imaging studies.   Pertinent labs include:  Recent Labs     10/21/22  0226 10/20/22  0414 10/19/22  0242   WBC 12.5* 11.3* 11.1   HGB 9.7* 9.3* 9.6*   HCT 32.0* 30.7* 31.1*    274 265       Recent Labs     10/21/22  0226 10/20/22  0414 10/19/22  0242    140 139   K 3.9 3.8 4.0   CL 97 100 99   CO2 36* 37* 36*   * 97 112*   BUN 6 6 5*   CREA 0.49* 0.48* 0.49*   CA 8.8 8.9 8.5         Signed: Gwendolynn Dakins, MD

## 2022-10-21 NOTE — PROGRESS NOTES
Pharmacy Medication Reconciliation     The patient's caregiver Eddie Torre was interviewed regarding current PTA medication list, use and drug allergies. The patient's caregiver Eddie Torre was questioned regarding use of any other inhalers, topical products, over the counter medications, herbal medications, vitamin products or ophthalmic/nasal/otic medication use. Allergy Update: Sulfamethoxazole-trimethoprim, Ciprofloxacin, Codeine, Cymbalta [duloxetine], Lyrica [pregabalin], Sulfa (sulfonamide antibiotics), Ultram [tramadol], Vancomycin, and Zosyn [piperacillin-tazobactam]    Recommendations/Findings: The following amendments were made to the patient's active medication list on file at [address] [address] [facility]:     1) Additions:   Carvedilol 6.25 mg tablets  Doxazosin 2 mg tablets  Metformin  mg tablets  Prednisone 20 mg tablets    2) Deletions:   Allopurinol 100 mg tablets  Amlodipine 5 mg tablets  Aspirin 81 mg tablets  Santyl 250 unit/gram ointment  Lidocaine 4% topical cream  Melatonin 3 mg tablets  Senna-docusate 8.6-50 mg tablets    3) Changes:   Gabapentin 100 mg - previous directions (100 mg TID), correct directions (200 mg TID - confirmed with patient's caregiver and Smava ). Pertinent Findings: When discussing the allergies the patient's caregiver stated that she is not aware of a Zosyn allergy leading to rash. She also mentioned that Vancomycin caused shortness of breath once and that this medication has been used in the past without any issues. The patient's caregiver mentioned that the patient gets the methadone from [address] [place] [address] (60 mg daily). Clarified PTA med list with the patient's representative and Smava . PTA medication list was corrected to the following:     Prior to Admission Medications   Prescriptions Last Dose Informant Taking?   carvediloL (COREG) 6.25 mg tablet 10/16/2022  Yes   Sig: Take 6.25 mg by mouth two (2) times daily (with meals).    colchicine 0.6 mg tablet Unknown Self No   Sig: Take 0.6 mg by mouth daily as needed for Gout or Pain. Indications: acute inflammation of the joints due to gout attack   doxazosin (CARDURA) 2 mg tablet 10/16/2022  Yes   Sig: Take 2 mg by mouth daily. gabapentin (NEURONTIN) 100 mg capsule 10/16/2022 Significant Other Yes   Sig: Take 200 mg by mouth three (3) times daily. ibuprofen (MOTRIN) 200 mg tablet 10/9/2022  Yes   Sig: Take 400 mg by mouth every six (6) hours as needed for Pain.   levothyroxine (SYNTHROID) 125 mcg tablet 10/16/2022 Self Yes   Sig: Take 125 mcg by mouth Daily (before breakfast). metFORMIN ER (GLUCOPHAGE XR) 500 mg tablet 10/16/2022  Yes   Sig: Take 500 mg by mouth daily. methadone (DOLOPHINE) 10 mg/mL solution 10/16/2022 Self Yes   Sig: Take 60 mg by mouth daily. Indications: excessive pain   pantoprazole (PROTONIX) 40 mg tablet 10/16/2022  Yes   Sig: Take 40 mg by mouth daily. predniSONE (DELTASONE) 20 mg tablet 10/16/2022  Yes   Sig: Take 20 mg by mouth daily.    tamsulosin (FLOMAX) 0.4 mg capsule 10/16/2022  Yes   Sig: Take 1 capsule by mouth daily after dinner      Facility-Administered Medications: None        Thank you,  Maria Fernanda Burleson

## 2022-10-22 LAB
ANION GAP SERPL CALC-SCNC: 6 MMOL/L (ref 5–15)
BACTERIA SPEC CULT: NORMAL
BUN SERPL-MCNC: 7 MG/DL (ref 6–20)
BUN/CREAT SERPL: 11 (ref 12–20)
CALCIUM SERPL-MCNC: 9.3 MG/DL (ref 8.5–10.1)
CHLORIDE SERPL-SCNC: 93 MMOL/L (ref 97–108)
CO2 SERPL-SCNC: 36 MMOL/L (ref 21–32)
CREAT SERPL-MCNC: 0.61 MG/DL (ref 0.7–1.3)
DATE LAST DOSE: NORMAL
ERYTHROCYTE [DISTWIDTH] IN BLOOD BY AUTOMATED COUNT: 16.5 % (ref 11.5–14.5)
GENTAMICIN SERPL-MCNC: <0.2 UG/ML
GLUCOSE SERPL-MCNC: 98 MG/DL (ref 65–100)
HCT VFR BLD AUTO: 33.8 % (ref 36.6–50.3)
HGB BLD-MCNC: 10.4 G/DL (ref 12.1–17)
MCH RBC QN AUTO: 24.9 PG (ref 26–34)
MCHC RBC AUTO-ENTMCNC: 30.8 G/DL (ref 30–36.5)
MCV RBC AUTO: 81.1 FL (ref 80–99)
NRBC # BLD: 0 K/UL (ref 0–0.01)
NRBC BLD-RTO: 0 PER 100 WBC
PLATELET # BLD AUTO: 350 K/UL (ref 150–400)
PMV BLD AUTO: 8.7 FL (ref 8.9–12.9)
POTASSIUM SERPL-SCNC: 4 MMOL/L (ref 3.5–5.1)
RBC # BLD AUTO: 4.17 M/UL (ref 4.1–5.7)
REPORTED DOSE,DOSE: NORMAL UNITS
REPORTED DOSE/TIME,TMG: NORMAL
SERVICE CMNT-IMP: NORMAL
SODIUM SERPL-SCNC: 135 MMOL/L (ref 136–145)
WBC # BLD AUTO: 17.5 K/UL (ref 4.1–11.1)

## 2022-10-22 PROCEDURE — 94760 N-INVAS EAR/PLS OXIMETRY 1: CPT

## 2022-10-22 PROCEDURE — 74011250636 HC RX REV CODE- 250/636: Performed by: STUDENT IN AN ORGANIZED HEALTH CARE EDUCATION/TRAINING PROGRAM

## 2022-10-22 PROCEDURE — 74011250636 HC RX REV CODE- 250/636: Performed by: INTERNAL MEDICINE

## 2022-10-22 PROCEDURE — 74011250637 HC RX REV CODE- 250/637: Performed by: INTERNAL MEDICINE

## 2022-10-22 PROCEDURE — 65270000046 HC RM TELEMETRY

## 2022-10-22 PROCEDURE — 74011250636 HC RX REV CODE- 250/636: Performed by: NURSE PRACTITIONER

## 2022-10-22 PROCEDURE — 85027 COMPLETE CBC AUTOMATED: CPT

## 2022-10-22 PROCEDURE — 74011250637 HC RX REV CODE- 250/637: Performed by: GENERAL ACUTE CARE HOSPITAL

## 2022-10-22 PROCEDURE — 36415 COLL VENOUS BLD VENIPUNCTURE: CPT

## 2022-10-22 PROCEDURE — 74011250637 HC RX REV CODE- 250/637: Performed by: STUDENT IN AN ORGANIZED HEALTH CARE EDUCATION/TRAINING PROGRAM

## 2022-10-22 PROCEDURE — 74011000250 HC RX REV CODE- 250: Performed by: STUDENT IN AN ORGANIZED HEALTH CARE EDUCATION/TRAINING PROGRAM

## 2022-10-22 PROCEDURE — 77030041074 HC DRSG AG OPTIFRM MDII -A

## 2022-10-22 PROCEDURE — 74011000258 HC RX REV CODE- 258: Performed by: INTERNAL MEDICINE

## 2022-10-22 PROCEDURE — 80048 BASIC METABOLIC PNL TOTAL CA: CPT

## 2022-10-22 RX ORDER — KETOROLAC TROMETHAMINE 30 MG/ML
30 INJECTION, SOLUTION INTRAMUSCULAR; INTRAVENOUS
Status: COMPLETED | OUTPATIENT
Start: 2022-10-22 | End: 2022-10-22

## 2022-10-22 RX ADMIN — PANTOPRAZOLE SODIUM 40 MG: 40 TABLET, DELAYED RELEASE ORAL at 06:23

## 2022-10-22 RX ADMIN — OXYCODONE 10 MG: 5 TABLET ORAL at 08:35

## 2022-10-22 RX ADMIN — OXYCODONE 10 MG: 5 TABLET ORAL at 17:56

## 2022-10-22 RX ADMIN — LEVOTHYROXINE SODIUM 125 MCG: 0.12 TABLET ORAL at 06:23

## 2022-10-22 RX ADMIN — SODIUM CHLORIDE, PRESERVATIVE FREE 10 ML: 5 INJECTION INTRAVENOUS at 14:00

## 2022-10-22 RX ADMIN — ENOXAPARIN SODIUM 30 MG: 100 INJECTION SUBCUTANEOUS at 08:35

## 2022-10-22 RX ADMIN — SODIUM CHLORIDE, PRESERVATIVE FREE 10 ML: 5 INJECTION INTRAVENOUS at 23:31

## 2022-10-22 RX ADMIN — OXYCODONE 10 MG: 5 TABLET ORAL at 02:41

## 2022-10-22 RX ADMIN — GABAPENTIN 100 MG: 300 CAPSULE ORAL at 08:35

## 2022-10-22 RX ADMIN — CARVEDILOL 6.25 MG: 6.25 TABLET, FILM COATED ORAL at 17:56

## 2022-10-22 RX ADMIN — DOXAZOSIN MESYLATE 2 MG: 2 TABLET ORAL at 08:35

## 2022-10-22 RX ADMIN — OXYCODONE 10 MG: 5 TABLET ORAL at 20:41

## 2022-10-22 RX ADMIN — OXYCODONE 10 MG: 5 TABLET ORAL at 05:43

## 2022-10-22 RX ADMIN — KETOROLAC TROMETHAMINE 30 MG: 30 INJECTION, SOLUTION INTRAMUSCULAR at 00:49

## 2022-10-22 RX ADMIN — GABAPENTIN 100 MG: 300 CAPSULE ORAL at 23:31

## 2022-10-22 RX ADMIN — MELATONIN 3 MG: at 20:41

## 2022-10-22 RX ADMIN — OXYCODONE 10 MG: 5 TABLET ORAL at 23:37

## 2022-10-22 RX ADMIN — SODIUM CHLORIDE, PRESERVATIVE FREE 5 ML: 5 INJECTION INTRAVENOUS at 00:50

## 2022-10-22 RX ADMIN — TAMSULOSIN HYDROCHLORIDE 0.4 MG: 0.4 CAPSULE ORAL at 08:35

## 2022-10-22 RX ADMIN — GABAPENTIN 100 MG: 300 CAPSULE ORAL at 17:56

## 2022-10-22 RX ADMIN — ENOXAPARIN SODIUM 30 MG: 100 INJECTION SUBCUTANEOUS at 20:41

## 2022-10-22 RX ADMIN — METHADONE HYDROCHLORIDE 60 MG: 10 CONCENTRATE ORAL at 08:35

## 2022-10-22 RX ADMIN — CARVEDILOL 6.25 MG: 6.25 TABLET, FILM COATED ORAL at 08:35

## 2022-10-22 RX ADMIN — GENTAMICIN SULFATE 440 MG: 40 INJECTION, SOLUTION INTRAMUSCULAR; INTRAVENOUS at 17:56

## 2022-10-22 NOTE — PROGRESS NOTES
Pt complaining of back pain 9/10 even after medication provided. Contacted Dr. Amanda Lara at [phone] for direction. All interventions available have been applied at this time. Awaiting recommendations.

## 2022-10-22 NOTE — PROGRESS NOTES
Pt states we are not managing his pain. .. Pt has a fentanyl patch 12mg placed last night at 1952 Methadone 60mg given at 0835 oxycodone IR 10mg given at 0835 Pt continues to scream and groan. and hollar out for doctors and nursing Pt wants you to come address his pain Apparently this has been an on going issue. MD Barron notified via [employer]  Pt states he doesn't want to stay in this hospital but also states he doesn't want to leave   Pt then states that he wants to go back to the ER down stairs Pt informed that to go back to the ER he would have to leave AMA  Pt offered heat pack refused  Pts bl  eeding has been changed to reduce wrinkling under his back, pt tolerated lying flat for about 10 min  Pt is unable to sit still due to pain.   Pt spins in circles in the bed  Pt educated on risks of falling out of the bed or injury  Pt expresses understanding of fall risk education   Urinal, personal belongings, call bell and phone within reach   3 rails up and bed alarm on and active in zone 2  Bed in lowest postion

## 2022-10-22 NOTE — PROGRESS NOTES
Received notification from bedside RN about patient with regards to: 9/10 back pain, not getting adequate relief from current pain regimen and requesting for additional medication for relief  VS: /115, HR 80, RR 24, O2 sat 99% on NC 2 L    Intervention given: Dilaudid 0.5 mg IV x 1 dose ordered    0024: Notified of patient pain level 9/10, not getting any relief from all recent pain medication given    - Toradol 30 mg IV x 1 dose ordered

## 2022-10-22 NOTE — PROGRESS NOTES
Pharmacy Antimicrobial Kinetic Dosing    Indication for Antimicrobials: UTI     Current Regimen of Each Antimicrobial:  Gentamicin 5 mg/kg/day (Start Date 10/19; Day # 4)    Previous Antimicrobial Therapy:  Ceftriaxone    Goal Level: PULSE DOSING GENTAMICIN  Peak: 15 - 20 mcg/mL  Trough: < or = 0.3 mcg/mL    Date Dose & Interval Measured (mcg/mL) Predicted AUC/ANANT   10/20 0414 440 mg IV Q24 1.6 (13 hours post dose) N/A   10/20 1457 - <0.2 (24 hrs post) -    1632 440 mg IV q24h 0.3 (2 hrs post) -   10/21 2300 - <0.2 (8 hrs post) -           Date & time of next level: gent level 10/23, 0200 (10-11 hrs post)     Dosing calculator used:  None    Significant Positive Cultures:   10/16 Urine - Carbapenem resistant Klebsiella Pneumonia. Susceptible to amikacin (ANANT 16), gentamicin (ANANT<=1), and trimeth/sulfa. Alternative ABX tested:  Ceftazadime-Avibactam Chesterdwayne Schreiber) (ANANT>256)  Ceftolozane-tazabactam (Zerbaxa) (ANANT>256)  Final      Conditions for Dosing Consideration: None    Labs:  Recent Labs     10/22/22  0320 10/21/22  0226 10/20/22  0414   CREA 0.61* 0.49* 0.48*   BUN 7 6 6     Recent Labs     10/22/22  0320 10/21/22  0226 10/20/22  0414   WBC 17.5* 12.5* 11.3*     Temp (24hrs), Av.9 °F (36.6 °C), Min:97.4 °F (36.3 °C), Max:98.6 °F (37 °C)      Creatinine Clearance (mL/min):   CrCl (Ideal Body Weight): 127.8   If actual weight < IBW: CrCl (Actual Body Weight) 169.6    Impression/Plan:   Gentamicin 5 mg/kg Q24 H for CRE (K. Pneumoniae) UTI. Gentamicin ANANT is acceptable (<=1)  Gent (8 hr post <0.2 from 10/21). Repeat 10 hrs post level tomorrow AM  BMP daily  Antimicrobial stop date TBD (Likely needs a minimum of 7 days)     Pharmacy will follow daily and adjust medications as appropriate for renal function and/or serum levels.     Thank you,  Krystyna Farley PHARMD    Vancomycin Dosing Document    Documents located on pharmacy Teams site: Clinical Practice -> Antimicrobial Stewardship -> Antibiotics_Vancomycin Aminoglycoside Dosing Document    Documents located on pharmacy Teams site: Clinical Practice -> Antimicrobial Stewardship -> Antibiotics_Aminoglycosides

## 2022-10-22 NOTE — PROGRESS NOTES
Bedside shift change report given to Arnoldo Evans (oncoming nurse) by Inderjit Tracy RN (offgoing nurse). Report included the following information SBAR, Kardex, Procedure Summary, Intake/Output, MAR, and Recent Results.

## 2022-10-23 LAB
DATE LAST DOSE: NORMAL
GENTAMICIN SERPL-MCNC: 4.1 UG/ML
REPORTED DOSE,DOSE: NORMAL UNITS
REPORTED DOSE/TIME,TMG: NORMAL

## 2022-10-23 PROCEDURE — 65270000046 HC RM TELEMETRY

## 2022-10-23 PROCEDURE — 36415 COLL VENOUS BLD VENIPUNCTURE: CPT

## 2022-10-23 PROCEDURE — 74011250637 HC RX REV CODE- 250/637: Performed by: STUDENT IN AN ORGANIZED HEALTH CARE EDUCATION/TRAINING PROGRAM

## 2022-10-23 PROCEDURE — 74011250637 HC RX REV CODE- 250/637: Performed by: INTERNAL MEDICINE

## 2022-10-23 PROCEDURE — 80170 ASSAY OF GENTAMICIN: CPT

## 2022-10-23 PROCEDURE — 74011000258 HC RX REV CODE- 258: Performed by: INTERNAL MEDICINE

## 2022-10-23 PROCEDURE — 74011250636 HC RX REV CODE- 250/636: Performed by: INTERNAL MEDICINE

## 2022-10-23 PROCEDURE — 77030041074 HC DRSG AG OPTIFRM MDII -A

## 2022-10-23 PROCEDURE — 74011000250 HC RX REV CODE- 250: Performed by: STUDENT IN AN ORGANIZED HEALTH CARE EDUCATION/TRAINING PROGRAM

## 2022-10-23 PROCEDURE — 74011250636 HC RX REV CODE- 250/636: Performed by: STUDENT IN AN ORGANIZED HEALTH CARE EDUCATION/TRAINING PROGRAM

## 2022-10-23 PROCEDURE — 74011250637 HC RX REV CODE- 250/637: Performed by: GENERAL ACUTE CARE HOSPITAL

## 2022-10-23 RX ORDER — OXYCODONE HYDROCHLORIDE 5 MG/1
5 TABLET ORAL ONCE
Status: COMPLETED | OUTPATIENT
Start: 2022-10-23 | End: 2022-10-23

## 2022-10-23 RX ADMIN — GABAPENTIN 100 MG: 300 CAPSULE ORAL at 20:04

## 2022-10-23 RX ADMIN — LEVOTHYROXINE SODIUM 125 MCG: 0.12 TABLET ORAL at 09:24

## 2022-10-23 RX ADMIN — PANTOPRAZOLE SODIUM 40 MG: 40 TABLET, DELAYED RELEASE ORAL at 06:34

## 2022-10-23 RX ADMIN — OXYCODONE 10 MG: 5 TABLET ORAL at 06:34

## 2022-10-23 RX ADMIN — GENTAMICIN SULFATE 440 MG: 40 INJECTION, SOLUTION INTRAMUSCULAR; INTRAVENOUS at 16:49

## 2022-10-23 RX ADMIN — TAMSULOSIN HYDROCHLORIDE 0.4 MG: 0.4 CAPSULE ORAL at 09:25

## 2022-10-23 RX ADMIN — OXYCODONE 5 MG: 5 TABLET ORAL at 15:34

## 2022-10-23 RX ADMIN — SODIUM CHLORIDE, PRESERVATIVE FREE 10 ML: 5 INJECTION INTRAVENOUS at 23:07

## 2022-10-23 RX ADMIN — GABAPENTIN 100 MG: 300 CAPSULE ORAL at 09:25

## 2022-10-23 RX ADMIN — OXYCODONE 10 MG: 5 TABLET ORAL at 23:09

## 2022-10-23 RX ADMIN — CARVEDILOL 6.25 MG: 6.25 TABLET, FILM COATED ORAL at 09:24

## 2022-10-23 RX ADMIN — OXYCODONE 10 MG: 5 TABLET ORAL at 10:37

## 2022-10-23 RX ADMIN — OXYCODONE 10 MG: 5 TABLET ORAL at 20:04

## 2022-10-23 RX ADMIN — METHADONE HYDROCHLORIDE 60 MG: 10 CONCENTRATE ORAL at 09:25

## 2022-10-23 RX ADMIN — MELATONIN 3 MG: at 20:04

## 2022-10-23 RX ADMIN — CARVEDILOL 6.25 MG: 6.25 TABLET, FILM COATED ORAL at 16:52

## 2022-10-23 RX ADMIN — OXYCODONE 10 MG: 5 TABLET ORAL at 02:30

## 2022-10-23 RX ADMIN — SODIUM CHLORIDE, PRESERVATIVE FREE 10 ML: 5 INJECTION INTRAVENOUS at 15:35

## 2022-10-23 RX ADMIN — DOXAZOSIN MESYLATE 2 MG: 2 TABLET ORAL at 09:24

## 2022-10-23 RX ADMIN — SODIUM CHLORIDE, PRESERVATIVE FREE 10 ML: 5 INJECTION INTRAVENOUS at 06:34

## 2022-10-23 RX ADMIN — ENOXAPARIN SODIUM 30 MG: 100 INJECTION SUBCUTANEOUS at 09:25

## 2022-10-23 RX ADMIN — GABAPENTIN 100 MG: 300 CAPSULE ORAL at 15:34

## 2022-10-23 NOTE — PROGRESS NOTES
Bedside shift report given to onccarlos Sheldon RN by off going nurse Jacques Ferrara RN patient alert and stable during shift change will continue to assess     1900  Bedside shift report given to onccarlos Ferrara RN patient alert stable no distress noted during shift change

## 2022-10-23 NOTE — PROGRESS NOTES
Problem: Patient Education: Go to Patient Education Activity  Goal: Patient/Family Education  Outcome: Not Progressing Towards Goal     Problem: Patient Education: Go to Patient Education Activity  Goal: Patient/Family Education  Outcome: Not Progressing Towards Goal     Problem: Pressure Injury - Risk of  Goal: *Prevention of pressure injury  Description: Document Dejuan Scale and appropriate interventions in the flowsheet. Outcome: Not Progressing Towards Goal  Note: Pressure Injury Interventions:  Sensory Interventions: Assess changes in LOC, Assess need for specialty bed    Moisture Interventions: Absorbent underpads, Apply protective barrier, creams and emollients    Activity Interventions: Assess need for specialty bed, Increase time out of bed    Mobility Interventions: Assess need for specialty bed, Chair cushion    Nutrition Interventions: Document food/fluid/supplement intake, Discuss nutritional consult with provider    Friction and Shear Interventions: Apply protective barrier, creams and emollients, Feet elevated on foot rest                Problem: Patient Education: Go to Patient Education Activity  Goal: Patient/Family Education  Outcome: Not Progressing Towards Goal     Problem: Falls - Risk of  Goal: *Absence of Falls  Description: Document Denniecas Perez Fall Risk and appropriate interventions in the flowsheet.   Outcome: Not Progressing Towards Goal  Note: Fall Risk Interventions:  Mobility Interventions: Assess mobility with egress test, Bed/chair exit alarm, OT consult for ADLs         Medication Interventions: Assess postural VS orthostatic hypotension, Bed/chair exit alarm, Patient to call before getting OOB    Elimination Interventions: Bed/chair exit alarm, Call light in reach, Elevated toilet seat              Problem: Patient Education: Go to Patient Education Activity  Goal: Patient/Family Education  Outcome: Not Progressing Towards Goal     Problem: Pain  Goal: *Control of Pain  Outcome: Not Progressing Towards Goal     Problem: Patient Education: Go to Patient Education Activity  Goal: Patient/Family Education  Outcome: Not Progressing Towards Goal    Patient continues to experience pain with little relief from current regimen.

## 2022-10-23 NOTE — PROGRESS NOTES
Hospitalist Progress Note    NAME: Laura Mccray   :  1965   MRN:  [id number]     Laura Mccray is a 61 y.o.   male with past medical history as listed below who presents with complaints of severe back pain reportedly under shoulder blades reportedly 10/10 pain that is constant/aching as well as mild worsening of baseline shortness of breath secondary to underlying COPD. Assessment / Plan:  T12 inferior vertebral endplate compression fracture  Acute low back pain due to above  -CT abdomen shows possibility of T12 inferior vertebral endplate compression fracture. Pending MRI and will likely happen next week   -Patient reports that he will not be able to tolerate MRI except under sedation  -Anesthesia consulted for MRI under sedation  -Discussed with Dr. Dana Araya.  -Waiting on MRI  -pain control and will cont current regimen. Notified him that we can not escalate more than current doses due to risk of side effecs     Carbapenem resistant urinary tract infection  History of recent Gross catheter and also straight cath  -Urine culture grew Carbapenem resistant Klebsiella pneumonia  -Based on urine culture and sensitivity, currently on gentamicin  -May need IV gentamicin upon discharge      Chronic pain on methadone  -Continue home methadone      Debility secondary to CVA and recent left AKA  -Continue PT OT    Chronic wounds  -Continue wound care       Mild COPD exacerbation  Schedule DuoNebs every 6 hours x24 hours with every 4 hours as needed breakthrough nebs  S/p prednisone  Hold off on azithromycin at present given mild symptoms    History of gout  No evidence of active flare  Continue PTA allopurinol     GERD  Protonix daily     Essential hypertension  Continue PTA Coreg     Hypothyroidism  Continue PTA Synthroid     Incidental findings requiring outpatient follow up/ evaluation:  3 mm right lung nodule.  -Guidelines recommend repeat scan in 12 months  Increase in size of intermediate density left renal lesion. Recommend  follow-up with ultrasound in 6 months. Left AKA    30.0 - 39.9 Obese / Body mass index is 30.79 kg/m². Estimated discharge date: 10/22  Barriers: MRI, kyphoplasty    Code status: Full  Prophylaxis: Lovenox  Recommended Disposition:  tbd     Subjective:   Still reports some back pain. Waiting on mri  Asking for more meds              Objective:     VITALS:   Last 24hrs VS reviewed since prior progress note. Most recent are:  Patient Vitals for the past 24 hrs:   Temp Pulse Resp BP SpO2   10/22/22 2046 98.8 °F (37.1 °C) -- -- -- --   10/22/22 1600 -- 85 -- -- --   10/22/22 1504 98.9 °F (37.2 °C) 87 20 136/65 98 %   10/22/22 0834 98.1 °F (36.7 °C) 100 20 134/80 100 %   10/22/22 0312 97.8 °F (36.6 °C) 84 20 (!) 156/78 98 %         Intake/Output Summary (Last 24 hours) at 10/22/2022 2239  Last data filed at 10/22/2022 1504  Gross per 24 hour   Intake 200 ml   Output 900 ml   Net -700 ml          I had a face to face encounter and independently examined this patient  as outlined below:  PHYSICAL EXAM:  General: Alert, cooperative, appears stated age  Resp:  CTA bilaterally, no wheezing or rales. No accessory muscle use  CV:  Regular  rhythm,  No edema  GI:  Soft, Non distended, Non tender.   +Bowel sounds  Neurologic:  Alert and oriented X 3, normal speech, left side weakness, sensations are intact, pupils are reactive bilaterally  Psych:    Not anxious nor agitated  Skin:  Skin tears and wounds in various stages of healing on extremities  Extremities      Left AKA    Reviewed most current lab test results and cultures  YES  Reviewed most current radiology test results   YES  Review and summation of old records today    NO  Reviewed patient's current orders and MAR    YES  PMH/SH reviewed - no change compared to H&P  ________________________________________________________________________  Care Plan discussed with:    Comments   Patient x    Family      RN x    Care Manager     Consultant Multidiciplinary team rounds were held today with , nursing, pharmacist and clinical coordinator. Patient's plan of care was discussed; medications were reviewed and discharge planning was addressed. ________________________________________________________________________        Comments   >50% of visit spent in counseling and coordination of care x    ________________________________________________________________________  Eduardo Singh MD     Procedures: see electronic medical records for all procedures/Xrays and details which were not copied into this note but were reviewed prior to creation of Plan. LABS:  I reviewed today's most current labs and imaging studies.   Pertinent labs include:  Recent Labs     10/22/22  0320 10/21/22  0226 10/20/22  0414   WBC 17.5* 12.5* 11.3*   HGB 10.4* 9.7* 9.3*   HCT 33.8* 32.0* 30.7*    283 274       Recent Labs     10/22/22  0320 10/21/22  0226 10/20/22  0414   * 137 140   K 4.0 3.9 3.8   CL 93* 97 100   CO2 36* 36* 37*   GLU 98 105* 97   BUN 7 6 6   CREA 0.61* 0.49* 0.48*   CA 9.3 8.8 8.9         Signed: Eduardo Singh MD

## 2022-10-23 NOTE — PROGRESS NOTES
Pharmacy Antimicrobial Kinetic Dosing    Indication for Antimicrobials: UTI     Current Regimen of Each Antimicrobial:  Gentamicin 5 mg/kg/day (Start Date 10/19; Day # 5)    Previous Antimicrobial Therapy:  Ceftriaxone    Goal Level: PULSE DOSING GENTAMICIN  Peak: 15 - 20 mcg/mL  Trough: < or = 0.3 mcg/mL    Date Dose & Interval Measured (mcg/mL) Predicted AUC/ANANT   10/20 0414 440 mg IV Q24 1.6 (13 hrs post dose) N/A   10/20 1457 -- <0.2 (24 hrs post) -   10/21 1632 440 mg IV Q24 0.3 (2 hrs post) -   10/21 2300 -- <0.2 (8 hrs post) -   10/23 0207 440 mg IV q24h 4.1 (8 hrs post) -   10/24 440 mg IV q24h Planned 10-12 hrs post dose      Date & time of next level: gent level 10/24, 0400    Dosing calculator used:  None    Significant Positive Cultures:   10/16 Urine - Carbapenem resistant Klebsiella Pneumonia. Susceptible to amikacin (ANANT 16), gentamicin (ANANT<=1), and trimeth/sulfa. Alternative ABX tested:  Ceftazadime-Avibactam Rashad Hutson) (ANANT>256)  Ceftolozane-tazabactam (Zerbaxa) (ANANT>256)  Final      Conditions for Dosing Consideration: None    Labs:  Recent Labs     10/22/22  0320 10/21/22  0226   CREA 0.61* 0.49*   BUN 7 6     Recent Labs     10/22/22  0320 10/21/22  0226   WBC 17.5* 12.5*     Temp (24hrs), Av.7 °F (37.1 °C), Min:98.2 °F (36.8 °C), Max:99.3 °F (37.4 °C)      Creatinine Clearance (mL/min):   CrCl (Ideal Body Weight): 127.8   If actual weight < IBW: CrCl (Actual Body Weight) 169.6    Impression/Plan:   8 hrs post Gentamicin level 4.1 is within the 24 hours interval AUC of the Gentamicin kinetic nomogram.  Continue with the same dose. Gentamicin 5 mg/kg Q24 H for CRE (K. Pneumoniae) UTI. Gentamicin ANANT is acceptable (<=1)  BMP daily. Recheck Gent level tomorrow AM again, hopefully is within the optimal 10-12 hrs post infusion window.   Antimicrobial stop date TBD (Likely needs a minimum of 7 days)     Pharmacy will follow daily and adjust medications as appropriate for renal function and/or serum levels.     Thank you,  Dawna Calderon PHARMD    Vancomycin Dosing Document    Documents located on pharmacy Teams site: Clinical Practice -> Antimicrobial Stewardship -> Antibiotics_Vancomycin     Aminoglycoside Dosing Document    Documents located on pharmacy Teams site: Clinical Practice -> Antimicrobial Stewardship -> Antibiotics_Aminoglycosides

## 2022-10-24 ENCOUNTER — APPOINTMENT (OUTPATIENT)
Dept: MRI IMAGING | Age: 57
DRG: 321 | End: 2022-10-24
Attending: INTERNAL MEDICINE
Payer: MEDICAID

## 2022-10-24 ENCOUNTER — ANESTHESIA EVENT (OUTPATIENT)
Dept: MRI IMAGING | Age: 57
DRG: 321 | End: 2022-10-24
Payer: MEDICAID

## 2022-10-24 ENCOUNTER — ANESTHESIA (OUTPATIENT)
Dept: MRI IMAGING | Age: 57
DRG: 321 | End: 2022-10-24
Payer: MEDICAID

## 2022-10-24 LAB
ANION GAP SERPL CALC-SCNC: 5 MMOL/L (ref 5–15)
BUN SERPL-MCNC: 10 MG/DL (ref 6–20)
BUN/CREAT SERPL: 18 (ref 12–20)
CALCIUM SERPL-MCNC: 8.8 MG/DL (ref 8.5–10.1)
CHLORIDE SERPL-SCNC: 95 MMOL/L (ref 97–108)
CO2 SERPL-SCNC: 34 MMOL/L (ref 21–32)
CREAT SERPL-MCNC: 0.57 MG/DL (ref 0.7–1.3)
DATE LAST DOSE: NORMAL
GENTAMICIN SERPL-MCNC: <0.2 UG/ML
GLUCOSE SERPL-MCNC: 116 MG/DL (ref 65–100)
POTASSIUM SERPL-SCNC: 4.1 MMOL/L (ref 3.5–5.1)
REPORTED DOSE,DOSE: NORMAL UNITS
REPORTED DOSE/TIME,TMG: NORMAL
SODIUM SERPL-SCNC: 134 MMOL/L (ref 136–145)

## 2022-10-24 PROCEDURE — 72146 MRI CHEST SPINE W/O DYE: CPT

## 2022-10-24 PROCEDURE — 74011000250 HC RX REV CODE- 250: Performed by: ANESTHESIOLOGY

## 2022-10-24 PROCEDURE — 74011250636 HC RX REV CODE- 250/636: Performed by: STUDENT IN AN ORGANIZED HEALTH CARE EDUCATION/TRAINING PROGRAM

## 2022-10-24 PROCEDURE — 77030026438 HC STYL ET INTUB CARD -A: Performed by: ANESTHESIOLOGY

## 2022-10-24 PROCEDURE — 77030041074 HC DRSG AG OPTIFRM MDII -A

## 2022-10-24 PROCEDURE — 77010033678 HC OXYGEN DAILY

## 2022-10-24 PROCEDURE — 74011250636 HC RX REV CODE- 250/636: Performed by: NURSE ANESTHETIST, CERTIFIED REGISTERED

## 2022-10-24 PROCEDURE — 77030008684 HC TU ET CUF COVD -B: Performed by: ANESTHESIOLOGY

## 2022-10-24 PROCEDURE — 74011250636 HC RX REV CODE- 250/636

## 2022-10-24 PROCEDURE — 72148 MRI LUMBAR SPINE W/O DYE: CPT

## 2022-10-24 PROCEDURE — 74011250637 HC RX REV CODE- 250/637: Performed by: INTERNAL MEDICINE

## 2022-10-24 PROCEDURE — 74011000250 HC RX REV CODE- 250: Performed by: STUDENT IN AN ORGANIZED HEALTH CARE EDUCATION/TRAINING PROGRAM

## 2022-10-24 PROCEDURE — 74011250636 HC RX REV CODE- 250/636: Performed by: INTERNAL MEDICINE

## 2022-10-24 PROCEDURE — 94760 N-INVAS EAR/PLS OXIMETRY 1: CPT

## 2022-10-24 PROCEDURE — 76060000033 HC ANESTHESIA 1 TO 1.5 HR

## 2022-10-24 PROCEDURE — 74011250637 HC RX REV CODE- 250/637: Performed by: STUDENT IN AN ORGANIZED HEALTH CARE EDUCATION/TRAINING PROGRAM

## 2022-10-24 PROCEDURE — 74011250636 HC RX REV CODE- 250/636: Performed by: ANESTHESIOLOGY

## 2022-10-24 PROCEDURE — 74011000250 HC RX REV CODE- 250: Performed by: NURSE ANESTHETIST, CERTIFIED REGISTERED

## 2022-10-24 PROCEDURE — 76210000016 HC OR PH I REC 1 TO 1.5 HR

## 2022-10-24 PROCEDURE — 2709999900 HC NON-CHARGEABLE SUPPLY

## 2022-10-24 PROCEDURE — 80170 ASSAY OF GENTAMICIN: CPT

## 2022-10-24 PROCEDURE — 36415 COLL VENOUS BLD VENIPUNCTURE: CPT

## 2022-10-24 PROCEDURE — 80048 BASIC METABOLIC PNL TOTAL CA: CPT

## 2022-10-24 PROCEDURE — 65270000046 HC RM TELEMETRY

## 2022-10-24 PROCEDURE — 74011000258 HC RX REV CODE- 258: Performed by: INTERNAL MEDICINE

## 2022-10-24 PROCEDURE — 74011250637 HC RX REV CODE- 250/637: Performed by: GENERAL ACUTE CARE HOSPITAL

## 2022-10-24 PROCEDURE — 94640 AIRWAY INHALATION TREATMENT: CPT

## 2022-10-24 RX ORDER — SODIUM CHLORIDE 0.9 % (FLUSH) 0.9 %
5-40 SYRINGE (ML) INJECTION AS NEEDED
Status: DISCONTINUED | OUTPATIENT
Start: 2022-10-24 | End: 2022-10-27 | Stop reason: HOSPADM

## 2022-10-24 RX ORDER — SODIUM CHLORIDE 0.9 % (FLUSH) 0.9 %
5-40 SYRINGE (ML) INJECTION EVERY 8 HOURS
Status: CANCELLED | OUTPATIENT
Start: 2022-10-24

## 2022-10-24 RX ORDER — LIDOCAINE HYDROCHLORIDE 10 MG/ML
0.1 INJECTION, SOLUTION EPIDURAL; INFILTRATION; INTRACAUDAL; PERINEURAL AS NEEDED
Status: CANCELLED | OUTPATIENT
Start: 2022-10-24

## 2022-10-24 RX ORDER — HYDROMORPHONE HYDROCHLORIDE 1 MG/ML
0.2 INJECTION, SOLUTION INTRAMUSCULAR; INTRAVENOUS; SUBCUTANEOUS
Status: DISCONTINUED | OUTPATIENT
Start: 2022-10-24 | End: 2022-10-26

## 2022-10-24 RX ORDER — SODIUM CHLORIDE 0.9 % (FLUSH) 0.9 %
5-40 SYRINGE (ML) INJECTION AS NEEDED
Status: CANCELLED | OUTPATIENT
Start: 2022-10-24

## 2022-10-24 RX ORDER — SODIUM CHLORIDE, SODIUM LACTATE, POTASSIUM CHLORIDE, CALCIUM CHLORIDE 600; 310; 30; 20 MG/100ML; MG/100ML; MG/100ML; MG/100ML
INJECTION, SOLUTION INTRAVENOUS
Status: DISCONTINUED | OUTPATIENT
Start: 2022-10-24 | End: 2022-10-24 | Stop reason: HOSPADM

## 2022-10-24 RX ORDER — SODIUM CHLORIDE, SODIUM LACTATE, POTASSIUM CHLORIDE, CALCIUM CHLORIDE 600; 310; 30; 20 MG/100ML; MG/100ML; MG/100ML; MG/100ML
25 INJECTION, SOLUTION INTRAVENOUS CONTINUOUS
Status: DISCONTINUED | OUTPATIENT
Start: 2022-10-24 | End: 2022-10-27 | Stop reason: HOSPADM

## 2022-10-24 RX ORDER — DIPHENHYDRAMINE HYDROCHLORIDE 50 MG/ML
12.5 INJECTION, SOLUTION INTRAMUSCULAR; INTRAVENOUS AS NEEDED
Status: ACTIVE | OUTPATIENT
Start: 2022-10-24 | End: 2022-10-24

## 2022-10-24 RX ORDER — METHADONE HYDROCHLORIDE 10 MG/ML
10 INJECTION, SOLUTION INTRAMUSCULAR; INTRAVENOUS; SUBCUTANEOUS ONCE
Status: COMPLETED | OUTPATIENT
Start: 2022-10-24 | End: 2022-10-24

## 2022-10-24 RX ORDER — FENTANYL CITRATE 50 UG/ML
INJECTION, SOLUTION INTRAMUSCULAR; INTRAVENOUS AS NEEDED
Status: DISCONTINUED | OUTPATIENT
Start: 2022-10-24 | End: 2022-10-24 | Stop reason: HOSPADM

## 2022-10-24 RX ORDER — LIDOCAINE HYDROCHLORIDE 20 MG/ML
INJECTION, SOLUTION EPIDURAL; INFILTRATION; INTRACAUDAL; PERINEURAL AS NEEDED
Status: DISCONTINUED | OUTPATIENT
Start: 2022-10-24 | End: 2022-10-24 | Stop reason: HOSPADM

## 2022-10-24 RX ORDER — PROPOFOL 10 MG/ML
INJECTION, EMULSION INTRAVENOUS AS NEEDED
Status: DISCONTINUED | OUTPATIENT
Start: 2022-10-24 | End: 2022-10-24 | Stop reason: HOSPADM

## 2022-10-24 RX ORDER — ONDANSETRON 2 MG/ML
INJECTION INTRAMUSCULAR; INTRAVENOUS AS NEEDED
Status: DISCONTINUED | OUTPATIENT
Start: 2022-10-24 | End: 2022-10-24 | Stop reason: HOSPADM

## 2022-10-24 RX ORDER — FENTANYL CITRATE 50 UG/ML
INJECTION, SOLUTION INTRAMUSCULAR; INTRAVENOUS
Status: COMPLETED
Start: 2022-10-24 | End: 2022-10-24

## 2022-10-24 RX ORDER — METHADONE HYDROCHLORIDE 10 MG/ML
INJECTION, SOLUTION INTRAMUSCULAR; INTRAVENOUS; SUBCUTANEOUS
Status: COMPLETED
Start: 2022-10-24 | End: 2022-10-24

## 2022-10-24 RX ORDER — SUCCINYLCHOLINE CHLORIDE 20 MG/ML
INJECTION INTRAMUSCULAR; INTRAVENOUS AS NEEDED
Status: DISCONTINUED | OUTPATIENT
Start: 2022-10-24 | End: 2022-10-24 | Stop reason: HOSPADM

## 2022-10-24 RX ORDER — SODIUM CHLORIDE 0.9 % (FLUSH) 0.9 %
5-40 SYRINGE (ML) INJECTION EVERY 8 HOURS
Status: DISCONTINUED | OUTPATIENT
Start: 2022-10-24 | End: 2022-10-27 | Stop reason: HOSPADM

## 2022-10-24 RX ORDER — FENTANYL CITRATE 50 UG/ML
25 INJECTION, SOLUTION INTRAMUSCULAR; INTRAVENOUS
Status: DISCONTINUED | OUTPATIENT
Start: 2022-10-24 | End: 2022-10-26

## 2022-10-24 RX ORDER — ONDANSETRON 2 MG/ML
INJECTION INTRAMUSCULAR; INTRAVENOUS AS NEEDED
Status: DISCONTINUED | OUTPATIENT
Start: 2022-10-24 | End: 2022-10-24

## 2022-10-24 RX ADMIN — PROPOFOL 150 MG: 10 INJECTION, EMULSION INTRAVENOUS at 14:12

## 2022-10-24 RX ADMIN — DOXAZOSIN MESYLATE 2 MG: 2 TABLET ORAL at 09:32

## 2022-10-24 RX ADMIN — ACETAMINOPHEN 650 MG: 325 TABLET ORAL at 09:32

## 2022-10-24 RX ADMIN — ACETAMINOPHEN 650 MG: 325 TABLET ORAL at 23:13

## 2022-10-24 RX ADMIN — CARVEDILOL 6.25 MG: 6.25 TABLET, FILM COATED ORAL at 09:32

## 2022-10-24 RX ADMIN — FENTANYL CITRATE 25 MCG: 50 INJECTION, SOLUTION INTRAMUSCULAR; INTRAVENOUS at 15:48

## 2022-10-24 RX ADMIN — SODIUM CHLORIDE, PRESERVATIVE FREE 10 ML: 5 INJECTION INTRAVENOUS at 17:36

## 2022-10-24 RX ADMIN — GABAPENTIN 100 MG: 300 CAPSULE ORAL at 09:32

## 2022-10-24 RX ADMIN — TAMSULOSIN HYDROCHLORIDE 0.4 MG: 0.4 CAPSULE ORAL at 09:32

## 2022-10-24 RX ADMIN — FENTANYL CITRATE 25 MCG: 50 INJECTION, SOLUTION INTRAMUSCULAR; INTRAVENOUS at 14:12

## 2022-10-24 RX ADMIN — FENTANYL CITRATE 25 MCG: 50 INJECTION, SOLUTION INTRAMUSCULAR; INTRAVENOUS at 15:51

## 2022-10-24 RX ADMIN — MELATONIN 3 MG: at 23:12

## 2022-10-24 RX ADMIN — SODIUM CHLORIDE, PRESERVATIVE FREE 10 ML: 5 INJECTION INTRAVENOUS at 22:30

## 2022-10-24 RX ADMIN — METHADONE HYDROCHLORIDE 10 MG: 10 INJECTION, SOLUTION INTRAMUSCULAR; INTRAVENOUS; SUBCUTANEOUS at 16:00

## 2022-10-24 RX ADMIN — OXYCODONE 10 MG: 5 TABLET ORAL at 23:25

## 2022-10-24 RX ADMIN — FENTANYL CITRATE 25 MCG: 50 INJECTION, SOLUTION INTRAMUSCULAR; INTRAVENOUS at 15:24

## 2022-10-24 RX ADMIN — SODIUM CHLORIDE, POTASSIUM CHLORIDE, SODIUM LACTATE AND CALCIUM CHLORIDE 25 ML/HR: 600; 310; 30; 20 INJECTION, SOLUTION INTRAVENOUS at 20:42

## 2022-10-24 RX ADMIN — SODIUM CHLORIDE, PRESERVATIVE FREE 10 ML: 5 INJECTION INTRAVENOUS at 18:40

## 2022-10-24 RX ADMIN — LEVOTHYROXINE SODIUM 125 MCG: 0.12 TABLET ORAL at 09:32

## 2022-10-24 RX ADMIN — FENTANYL CITRATE 25 MCG: 50 INJECTION, SOLUTION INTRAMUSCULAR; INTRAVENOUS at 15:55

## 2022-10-24 RX ADMIN — SODIUM CHLORIDE, PRESERVATIVE FREE 10 ML: 5 INJECTION INTRAVENOUS at 23:24

## 2022-10-24 RX ADMIN — PHENYLEPHRINE HYDROCHLORIDE 200 MCG: 10 INJECTION INTRAVENOUS at 14:57

## 2022-10-24 RX ADMIN — OXYCODONE 10 MG: 5 TABLET ORAL at 20:30

## 2022-10-24 RX ADMIN — ONDANSETRON HYDROCHLORIDE 4 MG: 2 INJECTION, SOLUTION INTRAMUSCULAR; INTRAVENOUS at 14:36

## 2022-10-24 RX ADMIN — PHENYLEPHRINE HYDROCHLORIDE 200 MCG: 10 INJECTION INTRAVENOUS at 14:22

## 2022-10-24 RX ADMIN — SODIUM CHLORIDE, PRESERVATIVE FREE 10 ML: 5 INJECTION INTRAVENOUS at 06:15

## 2022-10-24 RX ADMIN — ENOXAPARIN SODIUM 30 MG: 100 INJECTION SUBCUTANEOUS at 20:30

## 2022-10-24 RX ADMIN — SODIUM CHLORIDE, POTASSIUM CHLORIDE, SODIUM LACTATE AND CALCIUM CHLORIDE: 600; 310; 30; 20 INJECTION, SOLUTION INTRAVENOUS at 14:08

## 2022-10-24 RX ADMIN — METHADONE HYDROCHLORIDE 60 MG: 10 CONCENTRATE ORAL at 09:32

## 2022-10-24 RX ADMIN — LIDOCAINE HYDROCHLORIDE 15 MG: 20 INJECTION, SOLUTION EPIDURAL; INFILTRATION; INTRACAUDAL; PERINEURAL at 14:12

## 2022-10-24 RX ADMIN — SUCCINYLCHOLINE CHLORIDE 160 MG: 20 INJECTION, SOLUTION INTRAMUSCULAR; INTRAVENOUS at 14:12

## 2022-10-24 RX ADMIN — PHENYLEPHRINE HYDROCHLORIDE 200 MCG: 10 INJECTION INTRAVENOUS at 14:26

## 2022-10-24 RX ADMIN — GABAPENTIN 100 MG: 300 CAPSULE ORAL at 21:25

## 2022-10-24 RX ADMIN — GENTAMICIN SULFATE 440 MG: 40 INJECTION, SOLUTION INTRAMUSCULAR; INTRAVENOUS at 18:40

## 2022-10-24 RX ADMIN — ENOXAPARIN SODIUM 30 MG: 100 INJECTION SUBCUTANEOUS at 09:33

## 2022-10-24 RX ADMIN — IPRATROPIUM BROMIDE AND ALBUTEROL SULFATE 3 ML: .5; 3 SOLUTION RESPIRATORY (INHALATION) at 23:59

## 2022-10-24 RX ADMIN — FENTANYL CITRATE 25 MCG: 50 INJECTION, SOLUTION INTRAMUSCULAR; INTRAVENOUS at 15:45

## 2022-10-24 NOTE — PROGRESS NOTES
Comprehensive Nutrition Assessment    Type and Reason for Visit: Initial, RD nutrition re-screen/LOS    Nutrition Recommendations/Plan:   Advance to a cardiac diet      Malnutrition Assessment:  Malnutrition Status:  Insufficient data (10/24/22 4698)      Nutrition Assessment:    Patient medically noted for back pain, compression fracture, UTI, and COPD. PMH HTN, depression, bladder cancer, CVA, GERD, and left AKA. Chart reviewed for length of stay. Patient has been NPO today and off the floor at time of attempted visit. Resume diet s/p procedure and encourage intake of meals. Document %PO intake of meals in flowsheets. Patient Vitals for the past 168 hrs:   % Diet Eaten   10/23/22 1800 76 - 100%     Nutrition Related Findings:    - Na 134   BM 10/23   Synthroid, Protonix   Wound Type: Pressure injury?, Skin tears    Current Nutrition Intake & Therapies:  Average Meal Intake: %     DIET NPO    Anthropometric Measures:  Height: 6' (182.9 cm)  Ideal Body Weight (IBW): 178 lbs (81 kg)     Current Body Wt:  103 kg (227 lb 1.2 oz), 127.6 % IBW. Current BMI (kg/m2): 30.8                          BMI Category: Obese class 1 (BMI 30.0-34. 9)    Estimated Daily Nutrient Needs:  Energy Requirements Based On: Formula  Weight Used for Energy Requirements: Current  Energy (kcal/day): 1972 kcals (BMR 1893 x 1. 2AF -300kcals)  Weight Used for Protein Requirements: Current  Protein (g/day): 103g (1.0 g/kg bw)  Method Used for Fluid Requirements: 1 ml/kcal  Fluid (ml/day): 2000 mL    Nutrition Diagnosis:   No nutrition diagnosis at this time     Nutrition Interventions:   Food and/or Nutrient Delivery: Start oral diet  Nutrition Education/Counseling: No recommendations at this time  Coordination of Nutrition Care: Continue to monitor while inpatient       Goals:     Goals: PO intake 75% or greater, by next RD assessment       Nutrition Monitoring and Evaluation:   Behavioral-Environmental Outcomes: None identified  Food/Nutrient Intake Outcomes: Food and nutrient intake  Physical Signs/Symptoms Outcomes: Biochemical data, Weight, Skin    Discharge Planning:     (heart healthy diet)    Izaiah Richardson RD  Contact: ext [phone]

## 2022-10-24 NOTE — PROGRESS NOTES
Hospitalist Progress Note    NAME: Kay Gudino   :  1965   MRN:  [id number]     Kay Gudino is a 61 y.o.  male with past medical history as listed below who presents with complaints of severe back pain reportedly under shoulder blades reportedly 10/10 pain that is constant/aching as well as mild worsening of baseline shortness of breath secondary to underlying COPD. Assessment / Plan:  T12 inferior vertebral endplate compression fracture  Acute low back pain due to above  -CT abdomen shows possibility of T12 inferior vertebral endplate compression fracture. Pending MRI and will likely happen next week   -Patient reports that he will not be able to tolerate MRI except under sedation  -Anesthesia consulted for MRI under sedation  -Discussed with Dr. Tonya Ruvalcaba.  -Waiting on MRI to be done under sedation. Anesthesia aware. Waiting on scheduling availability. We will keep n.p.o. if anesthesia team is ready in case.  -pain control and will cont current regimen.  Notified him that we can not escalate more than current doses due to risk of side effecs     Carbapenem resistant urinary tract infection  History of recent Gross catheter and also straight cath  -Urine culture grew Carbapenem resistant Klebsiella pneumonia  -Based on urine culture and sensitivity, currently on gentamicin  -May need IV gentamicin upon discharge      Chronic pain on methadone  -Continue home methadone      Debility secondary to CVA and recent left AKA  -Continue PT OT    Chronic wounds  -Continue wound care       Mild COPD exacerbation  Schedule DuoNebs every 6 hours x24 hours with every 4 hours as needed breakthrough nebs  S/p prednisone  Hold off on azithromycin at present given mild symptoms    History of gout  No evidence of active flare  Continue PTA allopurinol     GERD  Protonix daily     Essential hypertension  Continue PTA Coreg     Hypothyroidism  Continue PTA Synthroid     Incidental findings requiring outpatient follow up/ evaluation:  3 mm right lung nodule. -Guidelines recommend repeat scan in 12 months  Increase in size of intermediate density left renal lesion. Recommend  follow-up with ultrasound in 6 months. Left AKA    30.0 - 39.9 Obese / Body mass index is 30.79 kg/m². Estimated discharge date: 10/22  Barriers: MRI, kyphoplasty    Code status: Full  Prophylaxis: Lovenox  Recommended Disposition:  tbd     Subjective:   Continues to report low back pain  Wants pain medications increased every 2 hours  No diarrhea or fever            Objective:     VITALS:   Last 24hrs VS reviewed since prior progress note. Most recent are:  Patient Vitals for the past 24 hrs:   Temp Pulse Resp BP SpO2   10/23/22 2001 98.6 °F (37 °C) 86 16 112/82 --   10/23/22 1508 98.7 °F (37.1 °C) 71 14 (!) 169/78 94 %   10/23/22 0926 98.4 °F (36.9 °C) 89 16 (!) 145/90 94 %   10/23/22 0400 -- 88 -- -- --   10/23/22 0354 98.2 °F (36.8 °C) 84 20 (!) 119/98 91 %   10/23/22 0000 -- 84 -- -- --   10/22/22 2328 99.3 °F (37.4 °C) 81 20 (!) 163/90 90 %         Intake/Output Summary (Last 24 hours) at 10/23/2022 2227  Last data filed at 10/23/2022 1800  Gross per 24 hour   Intake 1800 ml   Output 400 ml   Net 1400 ml          I had a face to face encounter and independently examined this patient  as outlined below:  PHYSICAL EXAM:  General: Alert, cooperative, appears stated age  Resp:  CTA bilaterally, no wheezing or rales. No accessory muscle use  CV:  Regular  rhythm,  No edema  GI:  Soft, Non distended, Non tender.   +Bowel sounds  Neurologic:  Alert and oriented X 3, normal speech, left side weakness, sensations are intact, pupils are reactive bilaterally  Psych:    Not anxious nor agitated  Skin:  Skin tears and wounds in various stages of healing on extremities  Extremities      Left AKA    Reviewed most current lab test results and cultures  YES  Reviewed most current radiology test results   YES  Review and summation of old records today NO  Reviewed patient's current orders and MAR    YES  PMH/SH reviewed - no change compared to H&P  ________________________________________________________________________  Care Plan discussed with:    Comments   Patient x    Family      RN x    Care Manager     Consultant                        Multidiciplinary team rounds were held today with , nursing, pharmacist and clinical coordinator. Patient's plan of care was discussed; medications were reviewed and discharge planning was addressed. ________________________________________________________________________        Comments   >50% of visit spent in counseling and coordination of care x    ________________________________________________________________________  Leatha Virgen MD     Procedures: see electronic medical records for all procedures/Xrays and details which were not copied into this note but were reviewed prior to creation of Plan. LABS:  I reviewed today's most current labs and imaging studies.   Pertinent labs include:  Recent Labs     10/22/22  0320 10/21/22  0226   WBC 17.5* 12.5*   HGB 10.4* 9.7*   HCT 33.8* 32.0*    283       Recent Labs     10/22/22  0320 10/21/22  0226   * 137   K 4.0 3.9   CL 93* 97   CO2 36* 36*   GLU 98 105*   BUN 7 6   CREA 0.61* 0.49*   CA 9.3 8.8         Signed: Leatha Virgen MD

## 2022-10-24 NOTE — PROGRESS NOTES
Problem: Falls - Risk of  Goal: *Absence of Falls  Description: Document Siis Embs Fall Risk and appropriate interventions in the flowsheet. Outcome: Progressing Towards Goal  Note: Fall Risk Interventions:  Mobility Interventions: Bed/chair exit alarm         Medication Interventions: Bed/chair exit alarm    Elimination Interventions: Call light in reach, Bed/chair exit alarm    History of Falls Interventions: Bed/chair exit alarm, Door open when patient unattended         Problem: Pain  Goal: *Control of Pain  Outcome: Progressing Towards Goal   Pt pain difficult to manage. Pt skin continues to decline and appears to be worsening. Pt free of falls.

## 2022-10-24 NOTE — ANESTHESIA PREPROCEDURE EVALUATION
Anesthetic History   No history of anesthetic complications            Review of Systems / Medical History  Patient summary reviewed, nursing notes reviewed and pertinent labs reviewed    Pulmonary    COPD: moderate      Smoker      Comments: 3 mm right lung nodule   Neuro/Psych     seizures  CVA  TIA and psychiatric history (Depression)    Comments: Thalamic pain syndrome    Hx Traumatic subarachnoid hemorrhage     Neurologic gait dysfunction Cardiovascular    Hypertension  Valvular problems/murmurs: tricuspid insufficiency and mitral insufficiency      Dysrhythmias       Exercise tolerance: <4 METS  Comments: ECG (10/16/22): Sinus tachycardia   When compared with ECG of 25-JUN-2021 14:21,   Nonspecific T wave abnormality no longer evident in Inferior leads     TTE (12/22/17): Left ventricle: Systolic function was normal. Ejection fraction was  estimated in the range of 55 % to 60 %. There were no regional wall motion  abnormalities. Mitral valve: There was mild regurgitation. Aortic valve: There was no stenosis. Tricuspid valve: There was mild regurgitation. Pulmonary artery systolic  pressure: 30 mmHg. Aorta, systemic arteries:  The root exhibited mild dilatation.      GI/Hepatic/Renal     GERD          Comments: Carbapenem resistant urinary tract infection  History of recent Gross catheter and also straight cath Endo/Other      Hypothyroidism  Obesity, cancer (Hx Bladder cancer s/p TURBT (8/10/18)) and anemia (Hgb = 10.4 on 10/24/22)     Other Findings   Comments: T12 inferior vertebral endplate compression fracture with acute low back pain     Chronic pain and narcotic use  On methadone    Debility secondary to CVA and recent left AKA    Gout         Physical Exam    Airway  Mallampati: II  TM Distance: > 6 cm  Neck ROM: normal range of motion   Mouth opening: Normal     Cardiovascular  Regular rate and rhythm,  S1 and S2 normal,  no murmur, click, rub, or gallop             Dental    Dentition: Poor dentition  Comments: Many missing and damaged teeth, several broken off at gumline with severe decay.    Pulmonary      Decreased breath sounds: bilateral          Comments: Poor air movement secondary to pain  Abdominal  GI exam deferred       Other Findings            Anesthetic Plan    ASA: 3  Anesthesia type: general          Induction: Intravenous  Anesthetic plan and risks discussed with: Patient

## 2022-10-24 NOTE — PROGRESS NOTES
Attempted supportive follow up visit on Med Tele. Patient was not in room. Consulted with nurse on unit who stated patient had gone down for an MRI. Left pastoral care note letting Mr Johana Perla know I came by. Will plan to follow up tomorrow.    available upon referral by staff or by patient/family request.       DYLAN Anthony, [facility], Staff   [facility]ERR[facility] Paging Service  [phone])

## 2022-10-24 NOTE — PERIOP NOTES
1530Handoff Report from Operating Room to PACU    Report received from Nas Dyer and SIMONA Rodas CRNA regarding Karina Mota. * No surgeons listed *  And * No procedures listed *  confirmed   with allergies and dressings discussed. Anesthesia type, drugs, patient history, complications, estimated blood loss, vital signs, intake and output, and last pain medication, lines, reversal medications, and temperature were reviewed. 1- Dr. Christopher Tarango called to bedside. Pt is writhing in pain c/o severe pain on left side of body (arm, chest, ribs, flank). No relief from pain meds given. Left chest appears slightly swollen, firm, and is extremely tender to touch. Order received for 10mg IV methadone now. 1640- TRANSFER - OUT REPORT:    Verbal report given to Siobhan RN(name) on Karina Mota  being transferred to [facility](unit) for routine post - op       Report consisted of patients Situation, Background, Assessment and   Recommendations(SBAR). Information from the following report(s) SBAR, Kardex, OR Summary, Procedure Summary, Intake/Output, MAR, and Recent Results was reviewed with the receiving nurse. Opportunity for questions and clarification was provided.       Patient transported with:   Monitor  O2 @ 4 liters  Registered Nurse  [facility]

## 2022-10-24 NOTE — PROGRESS NOTES
Pharmacy Antimicrobial Kinetic Dosing    Indication for Antimicrobials: UTI     Current Regimen of Each Antimicrobial:  Gentamicin 5 mg/kg/day (Start Date 10/19; Day # 6)    Previous Antimicrobial Therapy:  Ceftriaxone    Goal Level: PULSE DOSING GENTAMICIN  Peak: 15 - 20 mcg/mL  Trough: < or = 0.3 mcg/mL    Date Dose & Interval Measured (mcg/mL) Predicted AUC/ANANT   10/20 0414 440 mg IV Q24 1.6 (13 hrs post dose) N/A   10/20 1457 -- <0.2 (24 hrs post) -   10/21 1632 440 mg IV Q24 0.3 (2 hrs post) -   10/21 2300 -- <0.2 (8 hrs post) -   10/23 0207 440 mg IV q24h 4.1 (8 hrs post) -   10/24 440 mg IV q24h 0.2 (12 hrs post)      Date & time of next level: gent level 10/24, 0400    Dosing calculator used:  None    Significant Positive Cultures:   10/16 Urine - Carbapenem resistant Klebsiella Pneumonia. Susceptible to amikacin (ANANT 16), gentamicin (ANANT<=1), and trimeth/sulfa. Alternative ABX tested:  Ceftazadime-Avibactam Lujean Slimmer) (ANANT>256)  Ceftolozane-tazabactam (Zerbaxa) (ANANT>256)  Final      Conditions for Dosing Consideration: None    Labs:  Recent Labs     10/24/22  0510 10/22/22  0320   CREA 0.57* 0.61*   BUN 10 7     Recent Labs     10/22/22  0320   WBC 17.5*     Temp (24hrs), Av.7 °F (37.1 °C), Min:98.2 °F (36.8 °C), Max:99.8 °F (37.7 °C)      Creatinine Clearance (mL/min):   CrCl (Ideal Body Weight): 127.8   If actual weight < IBW: CrCl (Actual Body Weight) 169.6    Impression/Plan:   Levels are not a good fit to population model, however trough goals are within safe range. Tomorrow will make 7 days. Antimicrobial stop date TBD (Likely needs a minimum of 7 days)     Pharmacy will follow daily and adjust medications as appropriate for renal function and/or serum levels.     Thank you,  Jerel Starling, PHARMD    Vancomycin Dosing Document    Documents located on pharmacy Teams site: Clinical Practice -> Antimicrobial Stewardship -> Antibiotics_Vancomycin     Aminoglycoside Dosing Document    Documents located on pharmacy Teams site: Clinical Practice -> Antimicrobial Stewardship -> Antibiotics_Aminoglycosides

## 2022-10-24 NOTE — PROGRESS NOTES
Patient currently off the floor for procedure. Will continue to follow. Last PT note recommended rehab. If patient refuses, will need 24/7 supervision and assistance.     Lisa Hickman, PT

## 2022-10-24 NOTE — PROGRESS NOTES
Spiritual Care Assessment/Progress Note  [facility]      NAME: Cheng Soliz      MRN: [id number]  AGE: 61 y.o.  SEX: male  Hoahaoism Affiliation: Roman Catholic   Language: English     10/24/2022     Total Time (in minutes): 23     Spiritual Assessment begun in [facility] 2 MED TELE through conversation with:         [x]Patient        [] Family    [] Friend(s)        Reason for Consult: Initial/Spiritual assessment, patient floor     Spiritual beliefs: (Please include comment if needed)     [] Identifies with a kendy tradition:         [] Supported by a kendy community:            [] Claims no spiritual orientation:           [] Seeking spiritual identity:                [] Adheres to an individual form of spirituality:           [x] Not able to assess:   Did not indicate                        Identified resources for coping:      [] Prayer                               [] Music                  [] Guided Imagery     [x] Family/friends                 [] Pet visits     [] Devotional reading                         [] Unknown     [] Other:                                                Interventions offered during this visit: (See comments for more details)    Patient Interventions: Affirmation of emotions/emotional suffering, Catharsis/review of pertinent events in supportive environment, Initial/Spiritual assessment, patient floor, Life review/legacy           Plan of Care:     [x] Support spiritual and/or cultural needs    [] Support AMD and/or advance care planning process      [] Support grieving process   [] Coordinate Rites and/or Rituals    [] Coordination with community clergy   [] No spiritual needs identified at this time   [] Detailed Plan of Care below (See Comments)  [] Make referral to Music Therapy  [] Make referral to Pet Therapy     [] Make referral to Addiction services  [] Make referral to [facility]  [] Make referral to Spiritual Care Partner  [] No future visits requested        [x] Contact Spiritual Care for further referrals     Comments:  Reviewed chart prior to visit on [place] for spiritual assessment. No family/friends present. Patient was laying in bed, appearing uncomfortable and indicated he is in a significant amount of pain this morning. Mr Gil Dias shared he has a multitude of medical issues, but today the pain is most pronounced. He shared he has two daughters and two grandchildren (girl-3yrs and a boy born this summer). Visit came to an end when  received a page;  will return later today for continued support as patient requested  return.       DYLAN Olson, [facility], Staff [address]    [address] Paging Service  [phone]

## 2022-10-24 NOTE — PROGRESS NOTES
Bedside shift report given to oncoming nurse Amari Chase RN by off going nurse Violet Pitts RN patient alert and responsive during shift change no distress noted will continue to assess     1900  Bedside shift report given to oncoming nurse by off going nurse Amari Chase RN no distress noted patient alert and responsive patient stable during shift change

## 2022-10-24 NOTE — PROGRESS NOTES
Patient arrived from room, patient alert & oriented X4 and on 4L NC sats 89%. IV assessed, caused patient pain so new IV placed.

## 2022-10-24 NOTE — ANESTHESIA POSTPROCEDURE EVALUATION
* No procedures listed *.    general    Anesthesia Post Evaluation        Patient location during evaluation: PACU  Note status: Adequate. Level of consciousness: responsive to verbal stimuli and sleepy but conscious  Pain management: satisfactory to patient  Airway patency: patent  Anesthetic complications: no  Cardiovascular status: acceptable  Respiratory status: acceptable  Hydration status: acceptable  Comments: +Post-Anesthesia Evaluation and Assessment    Patient: Sofie Lal MRN: [id number]  SSN: [id number]   YOB: 1965  Age: 61 y.o. Sex: male      Cardiovascular Function/Vital Signs    BP (!) 141/93   Pulse 95   Temp 37.7 °C (99.8 °F)   Resp 19   Ht 6' (1.829 m)   Wt 103 kg (227 lb)   SpO2 93%   BMI 30.79 kg/m²     Patient is status post * No procedures listed *. Nausea/Vomiting: Controlled. Postoperative hydration reviewed and adequate. Pain:  Pain Scale 1: Visual (10/24/22 1630)  Pain Intensity 1: 0 (10/24/22 1630)   Managed. Neurological Status:   Neuro (WDL): Exceptions to WDL (10/24/22 1535)   At baseline. Mental Status and Level of Consciousness: Arousable. Pulmonary Status:   O2 Device: Nasal cannula (10/24/22 1630)   Adequate oxygenation and airway patent. Complications related to anesthesia: None    Post-anesthesia assessment completed. No concerns. Signed By: Deisy Junior MD    10/24/2022  Post anesthesia nausea and vomiting:  controlled      INITIAL Post-op Vital signs:   Vitals Value Taken Time   /93 10/24/22 1630   Temp 37.7 °C (99.8 °F) 10/24/22 1532   Pulse 92 10/24/22 1644   Resp 17 10/24/22 1644   SpO2 90 % 10/24/22 1644   Vitals shown include unvalidated device data.

## 2022-10-24 NOTE — PROGRESS NOTES
Hospitalist Progress Note    NAME: Natalya Pereira   :  1965   MRN:  [id number]     Natalya Pereira is a 61 y.o.   male with past medical history as listed below who presents with complaints of severe back pain reportedly under shoulder blades reportedly 10/10 pain that is constant/aching as well as mild worsening of baseline shortness of breath secondary to underlying COPD. Assessment / Plan:  T12 inferior vertebral endplate compression fracture  Acute low back pain due to above  -CT abdomen shows possibility of T12 inferior vertebral endplate compression fracture. -MRI to be done under sedation today by anesthesia per patient's request  -Based on MRI, will consider kyphoplasty  -Pain control with fentanyl patch, methadone 60 mg daily, oxycodone 10 mg every 3 hours as needed.  -Patient was notified that we will not be able to escalate his pain regimen since he is getting very high doses currently    Carbapenem resistant urinary tract infection  History of recent Gross catheter and also straight cath  -Urine culture grew Carbapenem resistant Klebsiella pneumonia  -Based on urine culture and sensitivity, currently on gentamicin  -We will treat with gentamicin for a total of 10 days. Chronic pain on methadone  -Continue home methadone      Debility secondary to CVA and recent left AKA  -Continue PT OT    Chronic wounds  -Continue wound care       Mild COPD exacerbation  Schedule DuoNebs every 6 hours x24 hours with every 4 hours as needed breakthrough nebs  S/p prednisone  Hold off on azithromycin at present given mild symptoms    Leukocytosis likely due to steroids  -WBC 17.5. He is afebrile. Check CBC in AM.    History of gout  No evidence of active flare  Continue PTA allopurinol     GERD  Protonix daily     Essential hypertension  Continue PTA Coreg     Hypothyroidism  Continue PTA Synthroid     Incidental findings requiring outpatient follow up/ evaluation:  3 mm right lung nodule.  -Guidelines recommend repeat scan in 12 months  Increase in size of intermediate density left renal lesion. Recommend  follow-up with ultrasound in 6 months. Left AKA    30.0 - 39.9 Obese / Body mass index is 30.79 kg/m². Estimated discharge date: 10/26  Barriers: MRI, kyphoplasty    Code status: Full  Prophylaxis: Lovenox  Recommended Disposition:  tbd     Subjective:   Continues to report 10 out of 10 lower back pain and wants me to increase pain medications  He is upset about a long delay in getting MRI            Objective:     VITALS:   Last 24hrs VS reviewed since prior progress note. Most recent are:  Patient Vitals for the past 24 hrs:   Temp Pulse Resp BP SpO2   10/24/22 1229 98.2 °F (36.8 °C) 75 18 112/80 92 %   10/24/22 0937 98.3 °F (36.8 °C) 89 17 114/70 90 %   10/24/22 0400 -- 87 -- -- --   10/24/22 0000 -- 88 -- -- --   10/23/22 2342 -- 94 -- -- --   10/23/22 2001 98.6 °F (37 °C) 86 16 112/82 --   10/23/22 1508 98.7 °F (37.1 °C) 71 14 (!) 169/78 94 %         Intake/Output Summary (Last 24 hours) at 10/24/2022 1311  Last data filed at 10/23/2022 1800  Gross per 24 hour   Intake 1800 ml   Output 400 ml   Net 1400 ml          I had a face to face encounter and independently examined this patient  as outlined below:  PHYSICAL EXAM:  General: Alert, cooperative, appears stated age  Resp:  CTA bilaterally, no wheezing or rales. No accessory muscle use  CV:  Regular  rhythm,  No edema  GI:  Soft, Non distended, Non tender.   +Bowel sounds  Neurologic:  Alert and oriented X 3, normal speech, left side weakness, sensations are intact, pupils are reactive bilaterally  Psych:    Not anxious nor agitated  Skin:  Skin tears and wounds in various stages of healing on extremities  Extremities      Left AKA    Reviewed most current lab test results and cultures  YES  Reviewed most current radiology test results   YES  Review and summation of old records today    NO  Reviewed patient's current orders and MAR YES  PMH/SH reviewed - no change compared to H&P  ________________________________________________________________________  Care Plan discussed with:    Comments   Patient x    Family      RN x    Care Manager     Consultant                        Multidiciplinary team rounds were held today with , nursing, pharmacist and clinical coordinator. Patient's plan of care was discussed; medications were reviewed and discharge planning was addressed. ________________________________________________________________________        Comments   >50% of visit spent in counseling and coordination of care x    ________________________________________________________________________  Scooby Vincent MD     Procedures: see electronic medical records for all procedures/Xrays and details which were not copied into this note but were reviewed prior to creation of Plan. LABS:  I reviewed today's most current labs and imaging studies.   Pertinent labs include:  Recent Labs     10/22/22  0320   WBC 17.5*   HGB 10.4*   HCT 33.8*          Recent Labs     10/24/22  0510 10/22/22  0320   * 135*   K 4.1 4.0   CL 95* 93*   CO2 34* 36*   * 98   BUN 10 7   CREA 0.57* 0.61*   CA 8.8 9.3         Signed: Scooby Vincent MD

## 2022-10-25 ENCOUNTER — APPOINTMENT (OUTPATIENT)
Dept: GENERAL RADIOLOGY | Age: 57
DRG: 321 | End: 2022-10-25
Attending: INTERNAL MEDICINE
Payer: MEDICAID

## 2022-10-25 LAB
ANION GAP SERPL CALC-SCNC: 5 MMOL/L (ref 5–15)
BUN SERPL-MCNC: 10 MG/DL (ref 6–20)
BUN/CREAT SERPL: 17 (ref 12–20)
CALCIUM SERPL-MCNC: 8.6 MG/DL (ref 8.5–10.1)
CHLORIDE SERPL-SCNC: 98 MMOL/L (ref 97–108)
CO2 SERPL-SCNC: 33 MMOL/L (ref 21–32)
CREAT SERPL-MCNC: 0.59 MG/DL (ref 0.7–1.3)
DATE LAST DOSE: ABNORMAL
ERYTHROCYTE [DISTWIDTH] IN BLOOD BY AUTOMATED COUNT: 16.7 % (ref 11.5–14.5)
GENTAMICIN PEAK SERPL-MCNC: 0.7 UG/ML (ref 5–10)
GLUCOSE SERPL-MCNC: 99 MG/DL (ref 65–100)
HCT VFR BLD AUTO: 27.8 % (ref 36.6–50.3)
HGB BLD-MCNC: 8.3 G/DL (ref 12.1–17)
MCH RBC QN AUTO: 24 PG (ref 26–34)
MCHC RBC AUTO-ENTMCNC: 29.9 G/DL (ref 30–36.5)
MCV RBC AUTO: 80.3 FL (ref 80–99)
NRBC # BLD: 0 K/UL (ref 0–0.01)
NRBC BLD-RTO: 0 PER 100 WBC
PLATELET # BLD AUTO: 220 K/UL (ref 150–400)
PMV BLD AUTO: 9.3 FL (ref 8.9–12.9)
POTASSIUM SERPL-SCNC: 4.1 MMOL/L (ref 3.5–5.1)
RBC # BLD AUTO: 3.46 M/UL (ref 4.1–5.7)
REPORTED DOSE,DOSE: ABNORMAL UNITS
REPORTED DOSE/TIME,TMG: ABNORMAL
SODIUM SERPL-SCNC: 136 MMOL/L (ref 136–145)
WBC # BLD AUTO: 14.7 K/UL (ref 4.1–11.1)

## 2022-10-25 PROCEDURE — 74011250636 HC RX REV CODE- 250/636: Performed by: INTERNAL MEDICINE

## 2022-10-25 PROCEDURE — 74011250637 HC RX REV CODE- 250/637: Performed by: INTERNAL MEDICINE

## 2022-10-25 PROCEDURE — 85027 COMPLETE CBC AUTOMATED: CPT

## 2022-10-25 PROCEDURE — 36415 COLL VENOUS BLD VENIPUNCTURE: CPT

## 2022-10-25 PROCEDURE — 65270000046 HC RM TELEMETRY

## 2022-10-25 PROCEDURE — 87070 CULTURE OTHR SPECIMN AEROBIC: CPT

## 2022-10-25 PROCEDURE — 74011000250 HC RX REV CODE- 250: Performed by: STUDENT IN AN ORGANIZED HEALTH CARE EDUCATION/TRAINING PROGRAM

## 2022-10-25 PROCEDURE — 74011250637 HC RX REV CODE- 250/637: Performed by: STUDENT IN AN ORGANIZED HEALTH CARE EDUCATION/TRAINING PROGRAM

## 2022-10-25 PROCEDURE — 74011250637 HC RX REV CODE- 250/637: Performed by: GENERAL ACUTE CARE HOSPITAL

## 2022-10-25 PROCEDURE — 74011000258 HC RX REV CODE- 258: Performed by: INTERNAL MEDICINE

## 2022-10-25 PROCEDURE — 71045 X-RAY EXAM CHEST 1 VIEW: CPT

## 2022-10-25 PROCEDURE — 80170 ASSAY OF GENTAMICIN: CPT

## 2022-10-25 PROCEDURE — 77010033678 HC OXYGEN DAILY

## 2022-10-25 PROCEDURE — 74011250636 HC RX REV CODE- 250/636: Performed by: STUDENT IN AN ORGANIZED HEALTH CARE EDUCATION/TRAINING PROGRAM

## 2022-10-25 PROCEDURE — 80048 BASIC METABOLIC PNL TOTAL CA: CPT

## 2022-10-25 PROCEDURE — 94761 N-INVAS EAR/PLS OXIMETRY MLT: CPT

## 2022-10-25 PROCEDURE — 74011000250 HC RX REV CODE- 250: Performed by: INTERNAL MEDICINE

## 2022-10-25 PROCEDURE — 74011000250 HC RX REV CODE- 250: Performed by: ANESTHESIOLOGY

## 2022-10-25 RX ORDER — METRONIDAZOLE 500 MG/100ML
500 INJECTION, SOLUTION INTRAVENOUS EVERY 8 HOURS
Status: DISCONTINUED | OUTPATIENT
Start: 2022-10-25 | End: 2022-10-26

## 2022-10-25 RX ORDER — CARVEDILOL 3.12 MG/1
3.12 TABLET ORAL 2 TIMES DAILY WITH MEALS
Status: DISCONTINUED | OUTPATIENT
Start: 2022-10-25 | End: 2022-11-20 | Stop reason: HOSPADM

## 2022-10-25 RX ADMIN — SODIUM CHLORIDE, PRESERVATIVE FREE 10 ML: 5 INJECTION INTRAVENOUS at 22:00

## 2022-10-25 RX ADMIN — OXYCODONE 10 MG: 5 TABLET ORAL at 02:36

## 2022-10-25 RX ADMIN — ACETAMINOPHEN 650 MG: 325 TABLET ORAL at 08:45

## 2022-10-25 RX ADMIN — METRONIDAZOLE 500 MG: 500 INJECTION, SOLUTION INTRAVENOUS at 22:14

## 2022-10-25 RX ADMIN — SODIUM CHLORIDE, PRESERVATIVE FREE 10 ML: 5 INJECTION INTRAVENOUS at 22:15

## 2022-10-25 RX ADMIN — GENTAMICIN SULFATE 440 MG: 40 INJECTION, SOLUTION INTRAMUSCULAR; INTRAVENOUS at 17:46

## 2022-10-25 RX ADMIN — DOXAZOSIN MESYLATE 2 MG: 2 TABLET ORAL at 08:45

## 2022-10-25 RX ADMIN — CARVEDILOL 3.12 MG: 3.12 TABLET, FILM COATED ORAL at 17:46

## 2022-10-25 RX ADMIN — LEVOTHYROXINE SODIUM 125 MCG: 0.12 TABLET ORAL at 06:56

## 2022-10-25 RX ADMIN — GABAPENTIN 100 MG: 300 CAPSULE ORAL at 21:59

## 2022-10-25 RX ADMIN — ACETAMINOPHEN 650 MG: 325 TABLET ORAL at 20:43

## 2022-10-25 RX ADMIN — OXYCODONE 10 MG: 5 TABLET ORAL at 20:40

## 2022-10-25 RX ADMIN — OXYCODONE 10 MG: 5 TABLET ORAL at 06:30

## 2022-10-25 RX ADMIN — CEFEPIME 2 G: 2 INJECTION, POWDER, FOR SOLUTION INTRAVENOUS at 22:14

## 2022-10-25 RX ADMIN — SODIUM CHLORIDE, PRESERVATIVE FREE 10 ML: 5 INJECTION INTRAVENOUS at 05:26

## 2022-10-25 RX ADMIN — TAMSULOSIN HYDROCHLORIDE 0.4 MG: 0.4 CAPSULE ORAL at 08:45

## 2022-10-25 RX ADMIN — CARVEDILOL 6.25 MG: 6.25 TABLET, FILM COATED ORAL at 08:45

## 2022-10-25 RX ADMIN — GABAPENTIN 100 MG: 300 CAPSULE ORAL at 08:45

## 2022-10-25 RX ADMIN — METHADONE HYDROCHLORIDE 60 MG: 10 CONCENTRATE ORAL at 08:44

## 2022-10-25 RX ADMIN — OXYCODONE 10 MG: 5 TABLET ORAL at 12:03

## 2022-10-25 RX ADMIN — OXYCODONE 10 MG: 5 TABLET ORAL at 15:23

## 2022-10-25 RX ADMIN — PANTOPRAZOLE SODIUM 40 MG: 40 TABLET, DELAYED RELEASE ORAL at 06:56

## 2022-10-25 RX ADMIN — GABAPENTIN 100 MG: 300 CAPSULE ORAL at 15:23

## 2022-10-25 NOTE — PROGRESS NOTES
Bedside and Verbal shift change report given to Rod Domínguez RN (oncoming nurse) by Aleksander Pacheco RN (offgoing nurse). Report included the following information SBAR, Procedure Summary, MAR, and Cardiac Rhythm Sinus Rhythm .

## 2022-10-25 NOTE — PROGRESS NOTES
Follow up visit with patient on [facility] Tele for ongoing pastoral support. Patient had been sleeping when pawel knocked on door and called his name. He indicated he would like to continue sleeping.  will attempt visit tomorrow.    available upon referral by staff or by patient/family request.       DYLAN Manning, [facility], Staff Chaplain [facility] Paging Service  [phone]

## 2022-10-25 NOTE — PROGRESS NOTES
Spoke with Kingston Maurer RN, informed patient is on schedule for procedure with anesthesia 10/27 at 1230 pm.

## 2022-10-25 NOTE — PROGRESS NOTES
Bedside shift change report given to [facility] RN (oncoming nurse) by [employer] RN (offgoing nurse).  Report included the following information SBAR, Kardex, MAR, and Cardiac Rhythm: Sinus rhythm

## 2022-10-25 NOTE — PROGRESS NOTES
Problem: Patient Education: Go to Patient Education Activity  Goal: Patient/Family Education  Outcome: Progressing Towards Goal     Problem: Patient Education: Go to Patient Education Activity  Goal: Patient/Family Education  Outcome: Progressing Towards Goal     Problem: Pressure Injury - Risk of  Goal: *Prevention of pressure injury  Description: Document Dejuan Scale and appropriate interventions in the flowsheet. Outcome: Progressing Towards Goal  Note: Pressure Injury Interventions:  Sensory Interventions: Assess changes in LOC, Keep linens dry and wrinkle-free, Minimize linen layers    Moisture Interventions: Absorbent underpads, Minimize layers    Activity Interventions: Increase time out of bed    Mobility Interventions: HOB 30 degrees or less    Nutrition Interventions: Document food/fluid/supplement intake    Friction and Shear Interventions: HOB 30 degrees or less                Problem: Patient Education: Go to Patient Education Activity  Goal: Patient/Family Education  Outcome: Progressing Towards Goal     Problem: Falls - Risk of  Goal: *Absence of Falls  Description: Document Jordon Fall Risk and appropriate interventions in the flowsheet.   Outcome: Progressing Towards Goal  Note: Fall Risk Interventions:  Mobility Interventions: Bed/chair exit alarm         Medication Interventions: Bed/chair exit alarm    Elimination Interventions: Bed/chair exit alarm, Call light in reach, Patient to call for help with toileting needs    History of Falls Interventions: Bed/chair exit alarm, Door open when patient unattended         Problem: Patient Education: Go to Patient Education Activity  Goal: Patient/Family Education  Outcome: Progressing Towards Goal     Problem: Pain  Goal: *Control of Pain  Outcome: Progressing Towards Goal     Problem: Patient Education: Go to Patient Education Activity  Goal: Patient/Family Education  Outcome: Progressing Towards Goal

## 2022-10-25 NOTE — PROGRESS NOTES
Problem: Patient Education: Go to Patient Education Activity  Goal: Patient/Family Education  Outcome: Progressing Towards Goal     Problem: Patient Education: Go to Patient Education Activity  Goal: Patient/Family Education  Outcome: Progressing Towards Goal     Problem: Pressure Injury - Risk of  Goal: *Prevention of pressure injury  Description: Document Dejuan Scale and appropriate interventions in the flowsheet. Outcome: Progressing Towards Goal  Note: Pressure Injury Interventions:  Sensory Interventions: Assess changes in LOC    Moisture Interventions: Absorbent underpads    Activity Interventions: Increase time out of bed    Mobility Interventions: HOB 30 degrees or less    Nutrition Interventions: Offer support with meals,snacks and hydration    Friction and Shear Interventions: HOB 30 degrees or less                Problem: Patient Education: Go to Patient Education Activity  Goal: Patient/Family Education  Outcome: Progressing Towards Goal     Problem: Falls - Risk of  Goal: *Absence of Falls  Description: Document Jordon Fall Risk and appropriate interventions in the flowsheet.   Outcome: Progressing Towards Goal  Note: Fall Risk Interventions:  Mobility Interventions: Bed/chair exit alarm         Medication Interventions: Bed/chair exit alarm, Patient to call before getting OOB    Elimination Interventions: Bed/chair exit alarm, Call light in reach    History of Falls Interventions: Bed/chair exit alarm         Problem: Patient Education: Go to Patient Education Activity  Goal: Patient/Family Education  Outcome: Progressing Towards Goal     Problem: Pain  Goal: *Control of Pain  Outcome: Progressing Towards Goal     Problem: Patient Education: Go to Patient Education Activity  Goal: Patient/Family Education  Outcome: Progressing Towards Goal

## 2022-10-25 NOTE — PROGRESS NOTES
Occupational Therapy  Attempted to see patient for OT treatment/weekly re-assessment, but he declined due to 9/10 \"excruciating\" pain. He indicates he can not move at this time. He looked to be in an uncomfortable position in bed. Offered to help him get repositioned, but he declined that as well. Discussed with RN, who reports he now has an order for a kyphoplasty due to multiple compression fractures. Will defer for now but continue to follow.

## 2022-10-26 LAB
ANION GAP SERPL CALC-SCNC: 5 MMOL/L (ref 5–15)
BUN SERPL-MCNC: 8 MG/DL (ref 6–20)
BUN/CREAT SERPL: 12 (ref 12–20)
CALCIUM SERPL-MCNC: 9.3 MG/DL (ref 8.5–10.1)
CHLORIDE SERPL-SCNC: 96 MMOL/L (ref 97–108)
CO2 SERPL-SCNC: 34 MMOL/L (ref 21–32)
CREAT SERPL-MCNC: 0.67 MG/DL (ref 0.7–1.3)
DATE LAST DOSE: ABNORMAL
ERYTHROCYTE [DISTWIDTH] IN BLOOD BY AUTOMATED COUNT: 16.4 % (ref 11.5–14.5)
GENTAMICIN PEAK SERPL-MCNC: 1.3 UG/ML (ref 5–10)
GLUCOSE SERPL-MCNC: 127 MG/DL (ref 65–100)
HCT VFR BLD AUTO: 29 % (ref 36.6–50.3)
HGB BLD-MCNC: 8.9 G/DL (ref 12.1–17)
MCH RBC QN AUTO: 24.6 PG (ref 26–34)
MCHC RBC AUTO-ENTMCNC: 30.7 G/DL (ref 30–36.5)
MCV RBC AUTO: 80.1 FL (ref 80–99)
NRBC # BLD: 0 K/UL (ref 0–0.01)
NRBC BLD-RTO: 0 PER 100 WBC
PLATELET # BLD AUTO: 278 K/UL (ref 150–400)
PMV BLD AUTO: 9 FL (ref 8.9–12.9)
POTASSIUM SERPL-SCNC: 4 MMOL/L (ref 3.5–5.1)
PROCALCITONIN SERPL-MCNC: 0.06 NG/ML
RBC # BLD AUTO: 3.62 M/UL (ref 4.1–5.7)
REPORTED DOSE,DOSE: ABNORMAL UNITS
REPORTED DOSE/TIME,TMG: ABNORMAL
SODIUM SERPL-SCNC: 135 MMOL/L (ref 136–145)
WBC # BLD AUTO: 12.1 K/UL (ref 4.1–11.1)

## 2022-10-26 PROCEDURE — 84145 PROCALCITONIN (PCT): CPT

## 2022-10-26 PROCEDURE — 80048 BASIC METABOLIC PNL TOTAL CA: CPT

## 2022-10-26 PROCEDURE — 74011250637 HC RX REV CODE- 250/637: Performed by: INTERNAL MEDICINE

## 2022-10-26 PROCEDURE — 85027 COMPLETE CBC AUTOMATED: CPT

## 2022-10-26 PROCEDURE — 74011250637 HC RX REV CODE- 250/637: Performed by: STUDENT IN AN ORGANIZED HEALTH CARE EDUCATION/TRAINING PROGRAM

## 2022-10-26 PROCEDURE — 74011000258 HC RX REV CODE- 258: Performed by: INTERNAL MEDICINE

## 2022-10-26 PROCEDURE — 65270000046 HC RM TELEMETRY

## 2022-10-26 PROCEDURE — 36415 COLL VENOUS BLD VENIPUNCTURE: CPT

## 2022-10-26 PROCEDURE — 74011250637 HC RX REV CODE- 250/637: Performed by: GENERAL ACUTE CARE HOSPITAL

## 2022-10-26 PROCEDURE — 74011000250 HC RX REV CODE- 250: Performed by: ANESTHESIOLOGY

## 2022-10-26 PROCEDURE — 74011250636 HC RX REV CODE- 250/636: Performed by: INTERNAL MEDICINE

## 2022-10-26 PROCEDURE — 74011250636 HC RX REV CODE- 250/636: Performed by: STUDENT IN AN ORGANIZED HEALTH CARE EDUCATION/TRAINING PROGRAM

## 2022-10-26 PROCEDURE — 94760 N-INVAS EAR/PLS OXIMETRY 1: CPT

## 2022-10-26 PROCEDURE — 92610 EVALUATE SWALLOWING FUNCTION: CPT

## 2022-10-26 PROCEDURE — 80170 ASSAY OF GENTAMICIN: CPT

## 2022-10-26 PROCEDURE — 74011000250 HC RX REV CODE- 250: Performed by: STUDENT IN AN ORGANIZED HEALTH CARE EDUCATION/TRAINING PROGRAM

## 2022-10-26 PROCEDURE — 77010033678 HC OXYGEN DAILY

## 2022-10-26 RX ORDER — OXYCODONE HYDROCHLORIDE 5 MG/1
15 TABLET ORAL
Status: DISCONTINUED | OUTPATIENT
Start: 2022-10-26 | End: 2022-11-08

## 2022-10-26 RX ORDER — METRONIDAZOLE 500 MG/100ML
500 INJECTION, SOLUTION INTRAVENOUS EVERY 12 HOURS
Status: DISCONTINUED | OUTPATIENT
Start: 2022-10-26 | End: 2022-10-27

## 2022-10-26 RX ORDER — BALSAM PERU/CASTOR OIL
OINTMENT (GRAM) TOPICAL 2 TIMES DAILY
Status: DISCONTINUED | OUTPATIENT
Start: 2022-10-26 | End: 2022-11-20 | Stop reason: HOSPADM

## 2022-10-26 RX ADMIN — OXYCODONE 10 MG: 5 TABLET ORAL at 05:53

## 2022-10-26 RX ADMIN — SODIUM CHLORIDE, PRESERVATIVE FREE 10 ML: 5 INJECTION INTRAVENOUS at 13:09

## 2022-10-26 RX ADMIN — CASTOR OIL AND BALSAM, PERU: 788; 87 OINTMENT TOPICAL at 21:00

## 2022-10-26 RX ADMIN — CARVEDILOL 3.12 MG: 3.12 TABLET, FILM COATED ORAL at 09:23

## 2022-10-26 RX ADMIN — MELATONIN 3 MG: at 22:02

## 2022-10-26 RX ADMIN — PANTOPRAZOLE SODIUM 40 MG: 40 TABLET, DELAYED RELEASE ORAL at 06:37

## 2022-10-26 RX ADMIN — ENOXAPARIN SODIUM 30 MG: 100 INJECTION SUBCUTANEOUS at 22:02

## 2022-10-26 RX ADMIN — CEFEPIME 2 G: 2 INJECTION, POWDER, FOR SOLUTION INTRAVENOUS at 13:09

## 2022-10-26 RX ADMIN — GABAPENTIN 100 MG: 300 CAPSULE ORAL at 09:23

## 2022-10-26 RX ADMIN — GABAPENTIN 100 MG: 300 CAPSULE ORAL at 22:01

## 2022-10-26 RX ADMIN — TAMSULOSIN HYDROCHLORIDE 0.4 MG: 0.4 CAPSULE ORAL at 09:23

## 2022-10-26 RX ADMIN — ACETAMINOPHEN 650 MG: 325 TABLET ORAL at 05:53

## 2022-10-26 RX ADMIN — OXYCODONE 15 MG: 5 TABLET ORAL at 22:01

## 2022-10-26 RX ADMIN — GENTAMICIN SULFATE 440 MG: 40 INJECTION, SOLUTION INTRAMUSCULAR; INTRAVENOUS at 16:44

## 2022-10-26 RX ADMIN — OXYCODONE 10 MG: 5 TABLET ORAL at 00:51

## 2022-10-26 RX ADMIN — DOXAZOSIN MESYLATE 2 MG: 2 TABLET ORAL at 09:23

## 2022-10-26 RX ADMIN — SODIUM CHLORIDE, PRESERVATIVE FREE 10 ML: 5 INJECTION INTRAVENOUS at 04:14

## 2022-10-26 RX ADMIN — ACETAMINOPHEN 650 MG: 325 TABLET ORAL at 09:22

## 2022-10-26 RX ADMIN — METHADONE HYDROCHLORIDE 60 MG: 10 CONCENTRATE ORAL at 09:22

## 2022-10-26 RX ADMIN — OXYCODONE 10 MG: 5 TABLET ORAL at 12:45

## 2022-10-26 RX ADMIN — SODIUM CHLORIDE, PRESERVATIVE FREE 10 ML: 5 INJECTION INTRAVENOUS at 22:00

## 2022-10-26 RX ADMIN — LEVOTHYROXINE SODIUM 125 MCG: 0.12 TABLET ORAL at 06:37

## 2022-10-26 RX ADMIN — OXYCODONE 15 MG: 5 TABLET ORAL at 19:20

## 2022-10-26 RX ADMIN — CEFEPIME 2 G: 2 INJECTION, POWDER, FOR SOLUTION INTRAVENOUS at 22:02

## 2022-10-26 RX ADMIN — METRONIDAZOLE 500 MG: 500 INJECTION, SOLUTION INTRAVENOUS at 17:47

## 2022-10-26 RX ADMIN — GABAPENTIN 100 MG: 300 CAPSULE ORAL at 16:05

## 2022-10-26 RX ADMIN — OXYCODONE 10 MG: 5 TABLET ORAL at 16:05

## 2022-10-26 RX ADMIN — OXYCODONE 10 MG: 5 TABLET ORAL at 08:50

## 2022-10-26 RX ADMIN — ENOXAPARIN SODIUM 30 MG: 100 INJECTION SUBCUTANEOUS at 09:23

## 2022-10-26 NOTE — PROGRESS NOTES
Speech Pathology Note    Initial SLP evaluation complete. Full note to follow. Recommend Easy to Chew/Thin Liquid diet. RN educated re: SLP evaluation. Thank you.     Mandeep Brown M.S., CCC-SLP

## 2022-10-26 NOTE — PROGRESS NOTES
Occupational Therapy    Chart reviewed and discussed with RN. OT session attempted. Pt reported he's in too much pain and can't participate in any therapy. Pt adamant about not participating despite encouragement. Pt stated after he gets his kyphoplasty tomorrow he'll participate in therapy as long as the pain has improved. Will follow up as appropriate.      Ivonne Martínez, OT

## 2022-10-26 NOTE — CONSULTS
ID  Consult received for- carbapenem resistant Klebsiella UTI and aspiration pneumonia. Currently on Gent, cefepime, flagyl. Chart and labs, cultures reviewed at length. D/w Dr Roger Lyons. Agree with current antibiotic plan  Aspiration precautions.

## 2022-10-26 NOTE — PROGRESS NOTES
Problem: Dysphagia (Adult)  Goal: *Acute Goals and Plan of Care (Insert Text)  Description: Speech Pathology Goals  Initiated 10/26/2022    1. Patient will tolerate Easy to Chew/Thin Liquid diet without signs of aspiration or adverse effects within 7 days. Outcome: Progressing Towards Goal     SPEECH LANGUAGE PATHOLOGY BEDSIDE SWALLOW EVALUATION  Patient: Reynaldo Queen (80 y.o. male)  Date: 10/26/2022  Primary Diagnosis: COPD with acute exacerbation ([facility] [address] [J44.1]       Precautions: Fall, Contact (ESBL wound)    ASSESSMENT :  Based on the objective data described below, the patient presents with a grossly functional swallow function. RN reported patient had choking episode earlier this morning with a grape likely 2/2 patient positioning; Patient placed on Puree/Thin Liquid diet. Patient seen at bedside on this date. Patient is a poor historian and reports difficulty with swallowing at the time of his stroke, but reports tolerating Regular/Thin Liquid diet at home since that time. Patient tolerated thin liquid sips and puree without overt difficulty or signs of aspiration. Patient with grossly functional mastication despite dental status (i.e. patient reports broken/chipped teeth). Delayed cough x1 observed following solid trial, which was not reproducible on subsequent trials. Recommend Easy to Chew/Thin Liquids with aspiration precautions outlined below. Suspect positioning will continue to be a limitation for PO intake. RN educated re: SLP evaluation and recommendations. Patient will benefit from skilled intervention to address the above impairments. Patients rehabilitation potential is considered to be good.      PLAN :  Recommendations and Planned Interventions:  -- Easy to Chew/Thin Liquids  -- Medication as Tolerated  -- As upright as possible with all PO; Use reverse trendelenburg  -- Small Bites/Sips    Frequency/Duration: Patient will be followed by speech-language pathology 3 times a week to address goals. Discharge Recommendations: To Be Determined     SUBJECTIVE:   Patient stated my nurse told me the sheets needed to be changed. Patient on nasal cannula, 5 LPM.    OBJECTIVE:     Past Medical History:   Diagnosis Date    Aneurysm ([address]     (with stroke) brain    Bladder tumor     Cancer ([address]     bladder CA    Chronic pain     Family history of bladder cancer     GERD (gastroesophageal reflux disease)     Gout     History of vascular access device 04/25/2019    [facility] VAT 4 FR MIDLINE R Cephalic Limited access    History of vascular access device 04/26/2019    4 fr Midline right Cephalic midline access    Hypercholesterolemia     Hypertension     Lesion of bladder     Seizures ([address]     Stroke ([address] 2006    left sided weakness    Thromboembolus ([address]     left leg    Thyroid disease     TIA (transient ischemic attack) 2012     Past Surgical History:   Procedure Laterality Date    HX ORTHOPAEDIC      R arthroscopy    HX OTHER SURGICAL      x2 L foot    HX UROLOGICAL  04/20/2018    Cystoscopy, TURBT (greater than 5 cm resected) Dr. Jodi Roque, [facility].     KNEE ARTHROSCP HARV      left knee    TRANSURETHRAL RESEC BLADDER NECK  08/10/2018     Prior Level of Function/Home Situation:   Home Situation  Home Environment: Apartment  # Steps to Enter: 0  One/Two Story Residence: One story  Living Alone: Yes (friend with him right now assisting with transfers and dressing)  Support Systems: Friend/Neighbor  Patient Expects to be Discharged to[de-identified] Home with home health (Rehab vs home health)  Current DME Used/Available at Home: Wheelchair, power, Hospital bed, Shower chair, Wheelchair, Commode, bedside (sliding board, electric scooter, grace)  Tub or Shower Type: Tub/Shower combination    Diet prior to admission: Regular/Thin Liquid  Current Diet: Puree/Thin Liquids     Cognitive and Communication Status:  Neurologic State: Alert  Orientation Level: Oriented to person, Oriented to place, Oriented to situation, Disoriented to time  Cognition: Decreased attention/concentration, Follows commands  Perception: Cues to maintain midline in sitting  Perseveration: No perseveration noted  Safety/Judgement: Awareness of environment, Decreased insight into deficits    Oral Assessment:  Oral Assessment  Labial: No impairment  Dentition: Natural;Partials (comment)  Oral Hygiene: Moist oral mucosa  Lingual: No impairment  Velum: Unable to visualize  Mandible: No impairment    P.O. Trials:  Patient Position: Semi-upright in bed  Vocal quality prior to P.O.: No impairment  Consistency Presented: Thin liquid;Puree; Solid  How Presented: Self-fed/presented;Straw;Successive swallows;Spoon     Bolus Acceptance: No impairment  Bolus Formation/Control: No impairment     Propulsion: Delayed (# of seconds)  Oral Residue: None        Aspiration Signs/Symptoms: Strong cough                      NOMS:   The NOMS functional outcome measure was used to quantify this patient's level of swallowing impairment. Based on the NOMS, the patient was determined to be at level 7 for swallow function. NOMS Swallowing Levels:  Level 1 (CN): NPO  Level 2 (CM): NPO but takes consistency in therapy  Level 3 (CL): Takes less than 50% of nutrition p.o. and continues with nonoral feedings; and/or safe with mod cues; and/or max diet restriction  Level 4 (CK): Safe swallow but needs mod cues; and/or mod diet restriction; and/or still requires some nonoral feeding/supplements  Level 5 (CJ): Safe swallow with min diet restriction; and/or needs min cues  Level 6 (CI): Independent with p.o.; rare cues; usually self cues; may need to avoid some foods or needs extra time  Level 7 ([address] [place] [address]): Independent for all p.o.  MARISOL. (2003). National Outcomes Measurement System (NOMS): Adult Speech-Language Pathology User's Guide.        Pain:  Pain Scale 1: Numeric (0 - 10)  Pain Intensity 1: 10  Pain Location 1: Back    After treatment:   Patient left in no apparent distress in bed, Call bell within reach, and Nursing notified    COMMUNICATION/EDUCATION:   Patient was educated regarding role of SLP and SLP POC. He demonstrated fair to good understanding as evidenced by verbalizing to indicate understanding. The patient's plan of care including recommendations, planned interventions, and recommended diet changes were discussed with: Registered nurse. Patient/family have participated as able in goal setting and plan of care. Patient/family agree to work toward stated goals and plan of care.     Thank you for this referral.  ANTWAN Duff  Time Calculation: 15 mins

## 2022-10-26 NOTE — PROGRESS NOTES
Physical Therapy    Per OT pt reported he's in too much pain and can't participate in any therapy. Pt adamant about not participating despite encouragement. Pt stated after he gets his kyphoplasty tomorrow he'll participate in therapy as long as the pain has improved.    Will continue to follow    Med Cui, PT, DPT

## 2022-10-26 NOTE — PROGRESS NOTES
Hospitalist Progress Note    NAME: Niki Hu   :  1965   MRN:  [id number]     Niki Hu is a 61 y.o.  male with past medical history as listed below who presents with complaints of severe back pain reportedly under shoulder blades reportedly 10/10 pain that is constant/aching as well as mild worsening of baseline shortness of breath secondary to underlying COPD. Assessment / Plan:  T12 inferior vertebral endplate compression fracture  Acute low back pain due to above  -CT abdomen shows possibility of T12 inferior vertebral endplate compression fracture. -MRI shows evidence of acute T12 fracture. We will consult interventional radiology for kyphoplasty.  -Pain control with fentanyl patch, methadone 60 mg daily, oxycodone 10 mg every 3 hours as needed.  -Patient was notified that we will not be able to escalate his pain regimen since he is getting very high doses currently    Carbapenem resistant urinary tract infection  History of recent Gross catheter and also straight cath  -Urine culture grew Carbapenem resistant Klebsiella pneumonia  -Based on urine culture and sensitivity, currently on gentamicin  -We will treat with gentamicin for a total of 10 days until 10/28/2022    Suspected aspiration pneumonia  Acute hypoxic respiratory failure  -MRI shows evidence of aspiration pneumonia. Chest x-ray also shows basilar infiltrates with possible effusion  -He is also reporting some worsening of shortness of breath along with cough and small amount of phlegm  -We will start empiric antibiotics with cefepime and Flagyl.  -Aspiration precautions.  -We will change to dysphagia diet. We will consult speech therapy.  -Has leukocytosis of 14,000 but he was recently on steroids which may be contributing. Check procalcitonin level.  -Continue oxygen nasal cannula, currently on 3 to 4 L.   Wean as tolerated    Chronic pain on methadone  -Continue home methadone      Debility secondary to CVA and recent left AKA  -Continue PT OT    Chronic wounds  -Continue wound care       Mild COPD exacerbation  Schedule DuoNebs every 6 hours x24 hours with every 4 hours as needed breakthrough nebs  S/p prednisone      Leukocytosis likely due to steroids  -WBC 14k. He is afebrile. Check CBC in AM.    History of gout  No evidence of active flare  Continue PTA allopurinol     GERD  Protonix daily     Essential hypertension  Continue PTA Coreg     Hypothyroidism  Continue PTA Synthroid     Incidental findings requiring outpatient follow up/ evaluation:  3 mm right lung nodule. -Guidelines recommend repeat scan in 12 months  Increase in size of intermediate density left renal lesion. Recommend  follow-up with ultrasound in 6 months. Left AKA    30.0 - 39.9 Obese / Body mass index is 30.79 kg/m². Estimated discharge date: 10/28  Barriers: kyphoplasty, placement    Code status: Full  Prophylaxis: Lovenox  Recommended Disposition:  tbd     Subjective:   He continues to report low back pain which is about 8 x 10. No weakness in the legs. Reports shortness of breath with slight worsening of cough. Small amount of yellow phlegm. No chest pain. Overnight events noted and discussed with nursing  No diarrhea  Still on 3 to 4 L of oxygen by nasal cannula              Objective:     VITALS:   Last 24hrs VS reviewed since prior progress note.  Most recent are:  Patient Vitals for the past 24 hrs:   Temp Pulse Resp BP SpO2   10/25/22 1600 -- 78 -- -- --   10/25/22 1200 -- 77 -- -- --   10/25/22 1156 97.3 °F (36.3 °C) 81 18 116/73 97 %   10/25/22 0842 97.6 °F (36.4 °C) 96 20 106/69 97 %   10/25/22 0800 -- (!) 101 -- -- --   10/25/22 0430 98.6 °F (37 °C) -- -- 112/63 --   10/25/22 0428 99.1 °F (37.3 °C) 88 18 (!) 86/51 94 %   10/25/22 0013 98.4 °F (36.9 °C) 89 18 115/65 94 %   10/24/22 2359 -- -- -- -- 94 %   10/24/22 2356 -- -- -- -- 96 %   10/24/22 2326 98.6 °F (37 °C) 93 18 112/70 93 %         Intake/Output Summary (Last 24 hours) at 10/25/2022 2136  Last data filed at 10/25/2022 1500  Gross per 24 hour   Intake 1050 ml   Output 1925 ml   Net -875 ml          I had a face to face encounter and independently examined this patient  as outlined below:  PHYSICAL EXAM:  General: Alert, cooperative, appears stated age  Resp:  Bilateral air entry is diminished at the bases, crackles present, no wheezing  CV:  Regular  rhythm,  No edema  GI:  Soft, Non distended, Non tender. +Bowel sounds  Neurologic:  Alert and oriented X 3, normal speech, left side weakness, sensations are intact, pupils are reactive bilaterally  Psych:    Not anxious nor agitated  Skin:  Skin tears and wounds in various stages of healing on extremities  Extremities      Left AKA    Reviewed most current lab test results and cultures  YES  Reviewed most current radiology test results   YES  Review and summation of old records today    NO  Reviewed patient's current orders and MAR    YES  PMH/ reviewed - no change compared to H&P  ________________________________________________________________________  Care Plan discussed with:    Comments   Patient x    Family      RN x    Care Manager     Consultant                        Multidiciplinary team rounds were held today with , nursing, pharmacist and clinical coordinator. Patient's plan of care was discussed; medications were reviewed and discharge planning was addressed. ________________________________________________________________________        Comments   >50% of visit spent in counseling and coordination of care x    ________________________________________________________________________  Erica Bronson MD     Procedures: see electronic medical records for all procedures/Xrays and details which were not copied into this note but were reviewed prior to creation of Plan. LABS:  I reviewed today's most current labs and imaging studies.   Pertinent labs include:  Recent Labs     10/25/22  0605   WBC 14.7*   HGB 8.3*   HCT 27.8*          Recent Labs     10/25/22  0605 10/24/22  0510    134*   K 4.1 4.1   CL 98 95*   CO2 33* 34*   GLU 99 116*   BUN 10 10   CREA 0.59* 0.57*   CA 8.6 8.8         Signed: Elizabeth Judd MD

## 2022-10-26 NOTE — PROGRESS NOTES
Pharmacy Antimicrobial Kinetic Dosing    Indication for Antimicrobials: UTI; aspiration PNA    Current Regimen of Each Antimicrobial:  Gentamicin 5 mg/kg/day (Start Date 10/19; Day # 8/10)  Cefepime  Flagyl  Previous Antimicrobial Therapy:  Ceftriaxone    Goal Level: PULSE DOSING GENTAMICIN  Peak: 15 - 20 mcg/mL  Trough: < or = 0.3 mcg/mL    Date Dose & Interval Measured (mcg/mL) Predicted AUC/ANANT   10/20 0414 440 mg IV Q24 1.6 (13 hrs post dose) N/A   10/20 1457 -- <0.2 (24 hrs post) -   10/21 1632 440 mg IV Q24 0.3 (2 hrs post) -   10/21 2300 -- <0.2 (8 hrs post) -   10/23 0207 440 mg IV q24h 4.1 (8 hrs post) -   10/24 440 mg IV q24h 0.2 (12 hrs post)    10/25 17:05  440 mg IV q24h 0.7 (trough)       Date & time of next level: tbd    Dosing calculator used:  None    Significant Positive Cultures:   10/16 Urine - Carbapenem resistant Klebsiella Pneumonia. Susceptible to amikacin (ANANT 16), gentamicin (ANANT<=1), and trimeth/sulfa. Alternative ABX tested:  Ceftazadime-Avibactam Charlcie Shoulders) (ANANT>256)  Ceftolozane-tazabactam (Zerbaxa) (ANANT>256)  Final      Conditions for Dosing Consideration: None    Labs:  Recent Labs     10/25/22  0605 10/24/22  0510   CREA 0.59* 0.57*   BUN 10 10     Recent Labs     10/25/22  0605   WBC 14.7*     Temp (24hrs), Av.5 °F (36.4 °C), Min:97.3 °F (36.3 °C), Max:97.6 °F (36.4 °C)      Creatinine Clearance (mL/min):   CrCl (Ideal Body Weight): 127.8   If actual weight < IBW: CrCl (Actual Body Weight) 169.6    Impression/Plan:   Gentamicin trough within goal.  Continue current therapy. Antimicrobial stop date 10 days     Pharmacy will follow daily and adjust medications as appropriate for renal function and/or serum levels.     Thank you,  Byron Messina, CARLOSD

## 2022-10-26 NOTE — PROGRESS NOTES
Patient refused for IV reinsertion and blood draw.  As per patient, He want to do it by the PICC team.

## 2022-10-26 NOTE — PROGRESS NOTES
Bedside and Verbal shift change report given to Carlita Bedoya RN (oncoming nurse) by Debbie Vizcaino RN (offgoing nurse). Report included the following information SBAR, Kardex, Procedure Summary, Intake/Output, MAR, and Cardiac Rhythm Sinus Rhythm .

## 2022-10-26 NOTE — PROGRESS NOTES
Dr. Sarahi Goins notified, patient continue to complain of back pain inspite of receiving 10 mg q3 and methadone 60 mg. Per Dr. Sarahi Goins he will come see patient today.

## 2022-10-26 NOTE — PROGRESS NOTES
Returned to patient room on [facility] for follow up pastoral support. Patient was laying in bed and stated he was tied up at the time.    available upon referral by staff or by patient/family request.       DYLAN Manning, [facility], Staff   [facility] Paging Service  [phone])

## 2022-10-27 ENCOUNTER — ANESTHESIA EVENT (OUTPATIENT)
Dept: INTERVENTIONAL RADIOLOGY/VASCULAR | Age: 57
DRG: 321 | End: 2022-10-27
Payer: MEDICAID

## 2022-10-27 ENCOUNTER — APPOINTMENT (OUTPATIENT)
Dept: INTERVENTIONAL RADIOLOGY/VASCULAR | Age: 57
DRG: 321 | End: 2022-10-27
Attending: INTERNAL MEDICINE
Payer: MEDICAID

## 2022-10-27 ENCOUNTER — ANESTHESIA (OUTPATIENT)
Dept: INTERVENTIONAL RADIOLOGY/VASCULAR | Age: 57
DRG: 321 | End: 2022-10-27
Payer: MEDICAID

## 2022-10-27 LAB
BACTERIA SPEC CULT: NORMAL
GRAM STN SPEC: NORMAL
GRAM STN SPEC: NORMAL
SERVICE CMNT-IMP: NORMAL

## 2022-10-27 PROCEDURE — 74011000250 HC RX REV CODE- 250: Performed by: STUDENT IN AN ORGANIZED HEALTH CARE EDUCATION/TRAINING PROGRAM

## 2022-10-27 PROCEDURE — 77030040175 HC TAMP SPN BN INFLT KYPH MEDT -I1

## 2022-10-27 PROCEDURE — 74011250636 HC RX REV CODE- 250/636: Performed by: NURSE ANESTHETIST, CERTIFIED REGISTERED

## 2022-10-27 PROCEDURE — 94760 N-INVAS EAR/PLS OXIMETRY 1: CPT

## 2022-10-27 PROCEDURE — 74011000636 HC RX REV CODE- 636: Performed by: STUDENT IN AN ORGANIZED HEALTH CARE EDUCATION/TRAINING PROGRAM

## 2022-10-27 PROCEDURE — 0PS43ZZ REPOSITION THORACIC VERTEBRA, PERCUTANEOUS APPROACH: ICD-10-PCS | Performed by: STUDENT IN AN ORGANIZED HEALTH CARE EDUCATION/TRAINING PROGRAM

## 2022-10-27 PROCEDURE — 65270000046 HC RM TELEMETRY

## 2022-10-27 PROCEDURE — 76210000063 HC OR PH I REC FIRST 0.5 HR

## 2022-10-27 PROCEDURE — 74011250637 HC RX REV CODE- 250/637: Performed by: STUDENT IN AN ORGANIZED HEALTH CARE EDUCATION/TRAINING PROGRAM

## 2022-10-27 PROCEDURE — 74011250636 HC RX REV CODE- 250/636: Performed by: INTERNAL MEDICINE

## 2022-10-27 PROCEDURE — 74011000250 HC RX REV CODE- 250: Performed by: NURSE ANESTHETIST, CERTIFIED REGISTERED

## 2022-10-27 PROCEDURE — 77030026438 HC STYL ET INTUB CARD -A: Performed by: STUDENT IN AN ORGANIZED HEALTH CARE EDUCATION/TRAINING PROGRAM

## 2022-10-27 PROCEDURE — 77030021678 HC GLIDESCP STAT DISP VERT -B: Performed by: STUDENT IN AN ORGANIZED HEALTH CARE EDUCATION/TRAINING PROGRAM

## 2022-10-27 PROCEDURE — 22513 PERQ VERTEBRAL AUGMENTATION: CPT

## 2022-10-27 PROCEDURE — 77030019908 HC STETH ESOPH SIMS -A: Performed by: STUDENT IN AN ORGANIZED HEALTH CARE EDUCATION/TRAINING PROGRAM

## 2022-10-27 PROCEDURE — 74011250636 HC RX REV CODE- 250/636: Performed by: STUDENT IN AN ORGANIZED HEALTH CARE EDUCATION/TRAINING PROGRAM

## 2022-10-27 PROCEDURE — 77010033678 HC OXYGEN DAILY

## 2022-10-27 PROCEDURE — 0PU43JZ SUPPLEMENT THORACIC VERTEBRA WITH SYNTHETIC SUBSTITUTE, PERCUTANEOUS APPROACH: ICD-10-PCS | Performed by: STUDENT IN AN ORGANIZED HEALTH CARE EDUCATION/TRAINING PROGRAM

## 2022-10-27 PROCEDURE — 76060000036 HC ANESTHESIA 2.5 TO 3 HR

## 2022-10-27 PROCEDURE — 77030008684 HC TU ET CUF COVD -B: Performed by: STUDENT IN AN ORGANIZED HEALTH CARE EDUCATION/TRAINING PROGRAM

## 2022-10-27 PROCEDURE — 74011250637 HC RX REV CODE- 250/637: Performed by: INTERNAL MEDICINE

## 2022-10-27 PROCEDURE — 74011000258 HC RX REV CODE- 258: Performed by: INTERNAL MEDICINE

## 2022-10-27 RX ORDER — SODIUM CHLORIDE 0.9 % (FLUSH) 0.9 %
5-40 SYRINGE (ML) INJECTION AS NEEDED
Status: DISCONTINUED | OUTPATIENT
Start: 2022-10-27 | End: 2022-11-20 | Stop reason: HOSPADM

## 2022-10-27 RX ORDER — SODIUM CHLORIDE 0.9 % (FLUSH) 0.9 %
5-40 SYRINGE (ML) INJECTION EVERY 8 HOURS
Status: DISCONTINUED | OUTPATIENT
Start: 2022-10-27 | End: 2022-11-16

## 2022-10-27 RX ORDER — FENTANYL CITRATE 50 UG/ML
50 INJECTION, SOLUTION INTRAMUSCULAR; INTRAVENOUS AS NEEDED
Status: CANCELLED | OUTPATIENT
Start: 2022-10-27

## 2022-10-27 RX ORDER — SODIUM CHLORIDE 9 MG/ML
INJECTION, SOLUTION INTRAVENOUS
Status: DISCONTINUED | OUTPATIENT
Start: 2022-10-27 | End: 2022-10-27 | Stop reason: HOSPADM

## 2022-10-27 RX ORDER — LIDOCAINE HYDROCHLORIDE 20 MG/ML
INJECTION, SOLUTION EPIDURAL; INFILTRATION; INTRACAUDAL; PERINEURAL AS NEEDED
Status: DISCONTINUED | OUTPATIENT
Start: 2022-10-27 | End: 2022-10-27

## 2022-10-27 RX ORDER — METRONIDAZOLE 250 MG/1
500 TABLET ORAL EVERY 12 HOURS
Status: COMPLETED | OUTPATIENT
Start: 2022-10-27 | End: 2022-10-31

## 2022-10-27 RX ORDER — PROPOFOL 10 MG/ML
INJECTION, EMULSION INTRAVENOUS AS NEEDED
Status: DISCONTINUED | OUTPATIENT
Start: 2022-10-27 | End: 2022-10-27 | Stop reason: HOSPADM

## 2022-10-27 RX ORDER — LIDOCAINE HYDROCHLORIDE 10 MG/ML
0.1 INJECTION, SOLUTION EPIDURAL; INFILTRATION; INTRACAUDAL; PERINEURAL AS NEEDED
Status: CANCELLED | OUTPATIENT
Start: 2022-10-27

## 2022-10-27 RX ORDER — CEFDINIR 300 MG/1
300 CAPSULE ORAL EVERY 12 HOURS
Status: COMPLETED | OUTPATIENT
Start: 2022-10-27 | End: 2022-10-31

## 2022-10-27 RX ORDER — FENTANYL CITRATE 50 UG/ML
25 INJECTION, SOLUTION INTRAMUSCULAR; INTRAVENOUS
Status: DISCONTINUED | OUTPATIENT
Start: 2022-10-27 | End: 2022-10-27 | Stop reason: HOSPADM

## 2022-10-27 RX ORDER — MIDAZOLAM HYDROCHLORIDE 1 MG/ML
1 INJECTION, SOLUTION INTRAMUSCULAR; INTRAVENOUS AS NEEDED
Status: CANCELLED | OUTPATIENT
Start: 2022-10-27

## 2022-10-27 RX ORDER — PHENYLEPHRINE HCL IN 0.9% NACL 0.4MG/10ML
SYRINGE (ML) INTRAVENOUS AS NEEDED
Status: DISCONTINUED | OUTPATIENT
Start: 2022-10-27 | End: 2022-10-27 | Stop reason: HOSPADM

## 2022-10-27 RX ORDER — HEPARIN SODIUM 200 [USP'U]/100ML
400 INJECTION, SOLUTION INTRAVENOUS ONCE
Status: COMPLETED | OUTPATIENT
Start: 2022-10-27 | End: 2022-10-27

## 2022-10-27 RX ORDER — ONDANSETRON 2 MG/ML
4 INJECTION INTRAMUSCULAR; INTRAVENOUS AS NEEDED
Status: DISCONTINUED | OUTPATIENT
Start: 2022-10-27 | End: 2022-10-27 | Stop reason: HOSPADM

## 2022-10-27 RX ORDER — KETAMINE HYDROCHLORIDE 10 MG/ML
INJECTION, SOLUTION INTRAMUSCULAR; INTRAVENOUS AS NEEDED
Status: DISCONTINUED | OUTPATIENT
Start: 2022-10-27 | End: 2022-10-27 | Stop reason: HOSPADM

## 2022-10-27 RX ORDER — MIDAZOLAM HYDROCHLORIDE 1 MG/ML
0.5 INJECTION, SOLUTION INTRAMUSCULAR; INTRAVENOUS
Status: DISCONTINUED | OUTPATIENT
Start: 2022-10-27 | End: 2022-10-27 | Stop reason: HOSPADM

## 2022-10-27 RX ORDER — BUPIVACAINE HYDROCHLORIDE 5 MG/ML
30 INJECTION, SOLUTION EPIDURAL; INTRACAUDAL ONCE
Status: COMPLETED | OUTPATIENT
Start: 2022-10-27 | End: 2022-10-27

## 2022-10-27 RX ORDER — LIDOCAINE HYDROCHLORIDE 20 MG/ML
18 INJECTION, SOLUTION INFILTRATION; PERINEURAL ONCE
Status: COMPLETED | OUTPATIENT
Start: 2022-10-27 | End: 2022-10-27

## 2022-10-27 RX ORDER — DIPHENHYDRAMINE HYDROCHLORIDE 50 MG/ML
12.5 INJECTION, SOLUTION INTRAMUSCULAR; INTRAVENOUS AS NEEDED
Status: DISCONTINUED | OUTPATIENT
Start: 2022-10-27 | End: 2022-10-27 | Stop reason: HOSPADM

## 2022-10-27 RX ORDER — HYDROMORPHONE HYDROCHLORIDE 1 MG/ML
0.5 INJECTION, SOLUTION INTRAMUSCULAR; INTRAVENOUS; SUBCUTANEOUS
Status: DISCONTINUED | OUTPATIENT
Start: 2022-10-27 | End: 2022-10-27 | Stop reason: HOSPADM

## 2022-10-27 RX ORDER — SUCCINYLCHOLINE CHLORIDE 20 MG/ML
INJECTION INTRAMUSCULAR; INTRAVENOUS AS NEEDED
Status: DISCONTINUED | OUTPATIENT
Start: 2022-10-27 | End: 2022-10-27 | Stop reason: HOSPADM

## 2022-10-27 RX ORDER — ONDANSETRON 2 MG/ML
INJECTION INTRAMUSCULAR; INTRAVENOUS AS NEEDED
Status: DISCONTINUED | OUTPATIENT
Start: 2022-10-27 | End: 2022-10-27 | Stop reason: HOSPADM

## 2022-10-27 RX ORDER — SODIUM CHLORIDE, SODIUM LACTATE, POTASSIUM CHLORIDE, CALCIUM CHLORIDE 600; 310; 30; 20 MG/100ML; MG/100ML; MG/100ML; MG/100ML
100 INJECTION, SOLUTION INTRAVENOUS CONTINUOUS
Status: CANCELLED | OUTPATIENT
Start: 2022-10-27 | End: 2022-10-28

## 2022-10-27 RX ADMIN — CASTOR OIL AND BALSAM, PERU: 788; 87 OINTMENT TOPICAL at 21:24

## 2022-10-27 RX ADMIN — ONDANSETRON HYDROCHLORIDE 4 MG: 2 INJECTION, SOLUTION INTRAMUSCULAR; INTRAVENOUS at 15:59

## 2022-10-27 RX ADMIN — ENOXAPARIN SODIUM 30 MG: 100 INJECTION SUBCUTANEOUS at 09:33

## 2022-10-27 RX ADMIN — OXYCODONE 15 MG: 5 TABLET ORAL at 09:27

## 2022-10-27 RX ADMIN — HEPARIN SODIUM 800 UNITS: 200 INJECTION, SOLUTION INTRAVENOUS at 14:00

## 2022-10-27 RX ADMIN — Medication 120 MCG: at 13:53

## 2022-10-27 RX ADMIN — CEFDINIR 300 MG: 300 CAPSULE ORAL at 18:44

## 2022-10-27 RX ADMIN — METRONIDAZOLE 500 MG: 250 TABLET ORAL at 18:44

## 2022-10-27 RX ADMIN — Medication 120 MCG: at 14:15

## 2022-10-27 RX ADMIN — OXYCODONE 15 MG: 5 TABLET ORAL at 04:01

## 2022-10-27 RX ADMIN — KETAMINE HYDROCHLORIDE 10 MG: 10 INJECTION, SOLUTION INTRAMUSCULAR; INTRAVENOUS at 13:49

## 2022-10-27 RX ADMIN — GABAPENTIN 100 MG: 300 CAPSULE ORAL at 21:23

## 2022-10-27 RX ADMIN — SODIUM CHLORIDE, PRESERVATIVE FREE 10 ML: 5 INJECTION INTRAVENOUS at 21:25

## 2022-10-27 RX ADMIN — GABAPENTIN 100 MG: 300 CAPSULE ORAL at 18:44

## 2022-10-27 RX ADMIN — CARVEDILOL 3.12 MG: 3.12 TABLET, FILM COATED ORAL at 18:44

## 2022-10-27 RX ADMIN — GABAPENTIN 100 MG: 300 CAPSULE ORAL at 09:35

## 2022-10-27 RX ADMIN — SUCCINYLCHOLINE CHLORIDE 160 MG: 20 INJECTION, SOLUTION INTRAMUSCULAR; INTRAVENOUS at 13:49

## 2022-10-27 RX ADMIN — TAMSULOSIN HYDROCHLORIDE 0.4 MG: 0.4 CAPSULE ORAL at 09:00

## 2022-10-27 RX ADMIN — METRONIDAZOLE 500 MG: 500 INJECTION, SOLUTION INTRAVENOUS at 06:00

## 2022-10-27 RX ADMIN — OXYCODONE 15 MG: 5 TABLET ORAL at 01:20

## 2022-10-27 RX ADMIN — Medication 80 MCG: at 13:49

## 2022-10-27 RX ADMIN — DOXAZOSIN MESYLATE 2 MG: 2 TABLET ORAL at 09:00

## 2022-10-27 RX ADMIN — CEFEPIME 2 G: 2 INJECTION, POWDER, FOR SOLUTION INTRAVENOUS at 06:00

## 2022-10-27 RX ADMIN — ENOXAPARIN SODIUM 30 MG: 100 INJECTION SUBCUTANEOUS at 21:23

## 2022-10-27 RX ADMIN — Medication 80 MCG: at 14:07

## 2022-10-27 RX ADMIN — OXYCODONE 15 MG: 5 TABLET ORAL at 06:57

## 2022-10-27 RX ADMIN — SODIUM CHLORIDE: 0.9 INJECTION, SOLUTION INTRAVENOUS at 13:38

## 2022-10-27 RX ADMIN — OXYCODONE 15 MG: 5 TABLET ORAL at 23:09

## 2022-10-27 RX ADMIN — IOPAMIDOL 3 ML: 612 INJECTION, SOLUTION INTRATHECAL at 14:00

## 2022-10-27 RX ADMIN — SODIUM CHLORIDE 50 MCG/MIN: 900 INJECTION, SOLUTION INTRAVENOUS at 14:17

## 2022-10-27 RX ADMIN — GENTAMICIN SULFATE 440 MG: 40 INJECTION, SOLUTION INTRAMUSCULAR; INTRAVENOUS at 15:03

## 2022-10-27 RX ADMIN — BUPIVACAINE HYDROCHLORIDE 150 MG: 5 INJECTION, SOLUTION EPIDURAL; INTRACAUDAL; PERINEURAL at 14:00

## 2022-10-27 RX ADMIN — PROPOFOL 90 MG: 10 INJECTION, EMULSION INTRAVENOUS at 13:49

## 2022-10-27 RX ADMIN — METHADONE HYDROCHLORIDE 60 MG: 10 CONCENTRATE ORAL at 09:28

## 2022-10-27 RX ADMIN — Medication 120 MCG: at 13:58

## 2022-10-27 RX ADMIN — LIDOCAINE HYDROCHLORIDE 360 MG: 20 INJECTION, SOLUTION INFILTRATION; PERINEURAL at 14:00

## 2022-10-27 NOTE — PROGRESS NOTES
Hospitalist Progress Note    NAME: Denver Spanner   :  1965   MRN:  [id number]     Denver Spanner is a 61 y.o.   male with past medical history as listed below who presents with complaints of severe back pain reportedly under shoulder blades reportedly 10/10 pain that is constant/aching as well as mild worsening of baseline shortness of breath secondary to underlying COPD. Assessment / Plan:  T12 inferior vertebral endplate compression fracture  Acute low back pain due to above  -CT abdomen shows possibility of T12 inferior vertebral endplate compression fracture. -MRI shows evidence of acute T12 fracture. - kyphoplasty performed 10/27/22  -Pain control with fentanyl patch, methadone 60 mg daily  - increased oxycodone to 15 mg every 3 hours as needed. Patient shared that he was on 30mg q3h for 2 years previously and that he does not wish to be taking that high of doses in the future. Is aware of their addictive qualities. - needs PT/OT    Carbapenem resistant urinary tract infection  History of recent Gross catheter and also straight cath  -Urine culture grew Carbapenem resistant Klebsiella pneumonia  -Based on urine culture and sensitivity, currently on gentamicin  - consulted ID 10/26/22, continue Gentamicin through 10/28/22, 10 day total course    Suspected aspiration pneumonia  Acute hypoxic respiratory failure  -MRI shows evidence of aspiration pneumonia. Chest x-ray also shows basilar infiltrates with possible effusion  -He is also reporting some worsening of shortness of breath along with cough and small amount of phlegm  -started empiric antibiotics with cefepime and Flagyl, started 10/25/22  - changed to Cefdinir flagyl, need duration for discharge  -Aspiration precautions.  -We will change to dysphagia diet. We will consult speech therapy, appreciate assistance  -Has leukocytosis of 14,000 but he was recently on steroids which may be contributing.   Procal normal  -Continue oxygen nasal cannula, Wean as tolerated    Chronic pain on methadone  -Continue home methadone. Rest of pain meds as above    Debility secondary to CVA and recent left AKA  -Continue PT OT    Chronic wounds  -Continue wound care    Mild COPD exacerbation  Schedule DuoNebs every 6 hours x24 hours with every 4 hours as needed breakthrough nebs  S/p prednisone    Leukocytosis likely due to steroids  -WBC 14k. He is afebrile    History of gout  No evidence of active flare  Continue PTA allopurinol     GERD  Protonix daily     Essential hypertension  Continue PTA Coreg     Hypothyroidism  Continue PTA Synthroid     Incidental findings requiring outpatient follow up/ evaluation:  3 mm right lung nodule. -Guidelines recommend repeat scan in 12 months  Increase in size of intermediate density left renal lesion. Recommend  follow-up with ultrasound in 6 months. Left AKA    30.0 - 39.9 Obese / Body mass index is 30.79 kg/m². Estimated discharge date: 10/28  Barriers: PT after kyphoplasty    Code status: Full  Prophylaxis: Lovenox  Recommended Disposition:  tbd     Subjective:   Seen after kyphoplasty, drowsy from sedation. No complaints. Objective:     VITALS:   Last 24hrs VS reviewed since prior progress note.  Most recent are:  Patient Vitals for the past 24 hrs:   Temp Pulse Resp BP SpO2   10/27/22 1830 -- 79 14 98/74 100 %   10/27/22 1700 97.9 °F (36.6 °C) 97 14 110/70 98 %   10/27/22 1645 98.6 °F (37 °C) 91 12 95/66 94 %   10/27/22 1630 98.6 °F (37 °C) 92 12 93/70 93 %   10/27/22 1625 -- 93 14 101/74 92 %   10/27/22 1620 -- 100 16 (!) 119/95 94 %   10/27/22 1615 98.8 °F (37.1 °C) 100 20 (!) 141/85 99 %   10/27/22 1208 99.9 °F (37.7 °C) 93 20 111/75 96 %   10/27/22 1128 99.8 °F (37.7 °C) 95 20 (!) 111/91 95 %   10/27/22 0829 99.4 °F (37.4 °C) 100 18 124/81 (!) 2 %   10/26/22 2157 98.4 °F (36.9 °C) 77 18 113/68 98 %         Intake/Output Summary (Last 24 hours) at 10/27/2022 1920  Last data filed at 10/27/2022 1615  Gross per 24 hour   Intake 800 ml   Output --   Net 800 ml          I had a face to face encounter and independently examined this patient  as outlined below:  PHYSICAL EXAM:  General: No distress  Resp:  Mild crackles present, no wheezing, nonlabored  CV:  Regular  rhythm,  No edema  GI:  Soft, Non distended, Non tender. +Bowel sounds  Neurologic:  Alert and oriented X 1, drowsy after procedure,  pupils are reactive bilaterally  Psych:    Not anxious nor agitated  Skin:  Skin tears and wounds in various stages of healing on extremities  Extremities      Left AKA    Reviewed most current lab test results and cultures  YES  Reviewed most current radiology test results   YES  Review and summation of old records today    NO  Reviewed patient's current orders and MAR    YES  PMH/SH reviewed - no change compared to H&P  ________________________________________________________________________  Care Plan discussed with:    Comments   Patient x    Family      RN x    Care Manager     Consultant                       y Multidiciplinary team rounds were held today with , nursing, pharmacist and clinical coordinator. Patient's plan of care was discussed; medications were reviewed and discharge planning was addressed. ________________________________________________________________________    Procedures: see electronic medical records for all procedures/Xrays and details which were not copied into this note but were reviewed prior to creation of Plan. LABS:  I reviewed today's most current labs and imaging studies.   Pertinent labs include:  Recent Labs     10/26/22  1037 10/25/22  0605   WBC 12.1* 14.7*   HGB 8.9* 8.3*   HCT 29.0* 27.8*    220       Recent Labs     10/26/22  1037 10/25/22  0605   * 136   K 4.0 4.1   CL 96* 98   CO2 34* 33*   * 99   BUN 8 10   CREA 0.67* 0.59*   CA 9.3 8.6       Signed: Ninetta Slice, MD

## 2022-10-27 NOTE — PROGRESS NOTES
Hospitalist Progress Note    NAME: Lavern Grant   :  1965   MRN:  [id number]     Lavern Grant is a 61 y.o.  male with past medical history as listed below who presents with complaints of severe back pain reportedly under shoulder blades reportedly 10/10 pain that is constant/aching as well as mild worsening of baseline shortness of breath secondary to underlying COPD. Assessment / Plan:  T12 inferior vertebral endplate compression fracture  Acute low back pain due to above  -CT abdomen shows possibility of T12 inferior vertebral endplate compression fracture. -MRI shows evidence of acute T12 fracture. - kyphoplasty planned for 10/27/22  -Pain control with fentanyl patch, methadone 60 mg daily  - increased oxycodone to 15 mg every 3 hours as needed. Patient shared that he was on 30mg q3h for 2 years previously and that he does not wish to be taking that high of doses in the future. Is aware of their addictive qualities. Carbapenem resistant urinary tract infection  History of recent Gross catheter and also straight cath  -Urine culture grew Carbapenem resistant Klebsiella pneumonia  -Based on urine culture and sensitivity, currently on gentamicin  - consulted ID 10/26/22, continue Gentamicin through 10/28/22, 10 day total course    Suspected aspiration pneumonia  Acute hypoxic respiratory failure  -MRI shows evidence of aspiration pneumonia. Chest x-ray also shows basilar infiltrates with possible effusion  -He is also reporting some worsening of shortness of breath along with cough and small amount of phlegm  -We will start empiric antibiotics with cefepime and Flagyl.  -Aspiration precautions.  -We will change to dysphagia diet. We will consult speech therapy, appreciate assistance  -Has leukocytosis of 14,000 but he was recently on steroids which may be contributing. Procal normal  -Continue oxygen nasal cannula, Wean as tolerated    Chronic pain on methadone  -Continue home methadone. Rest of pain meds as above    Debility secondary to CVA and recent left AKA  -Continue PT OT    Chronic wounds  -Continue wound care    Mild COPD exacerbation  Schedule DuoNebs every 6 hours x24 hours with every 4 hours as needed breakthrough nebs  S/p prednisone    Leukocytosis likely due to steroids  -WBC 14k. He is afebrile    History of gout  No evidence of active flare  Continue PTA allopurinol     GERD  Protonix daily     Essential hypertension  Continue PTA Coreg     Hypothyroidism  Continue PTA Synthroid     Incidental findings requiring outpatient follow up/ evaluation:  3 mm right lung nodule. -Guidelines recommend repeat scan in 12 months  Increase in size of intermediate density left renal lesion. Recommend  follow-up with ultrasound in 6 months. Left AKA    30.0 - 39.9 Obese / Body mass index is 30.79 kg/m². Estimated discharge date: 10/28  Barriers: kyphoplasty, PT after kyphoplasty    Code status: Full  Prophylaxis: Lovenox  Recommended Disposition:  tbd     Subjective:   Reports back pain, worse with movement. Discussed pain medications, see above in plan. No other complaints. Objective:     VITALS:   Last 24hrs VS reviewed since prior progress note.  Most recent are:  Patient Vitals for the past 24 hrs:   Temp Pulse Resp BP SpO2   10/26/22 1600 -- 100 -- -- --   10/26/22 1553 97.8 °F (36.6 °C) 100 18 117/71 100 %   10/26/22 1500 -- 83 -- -- --   10/26/22 1100 -- 90 -- -- --   10/26/22 0849 97.9 °F (36.6 °C) 83 -- 102/60 97 %   10/26/22 0800 -- 83 -- -- --         Intake/Output Summary (Last 24 hours) at 10/26/2022 2054  Last data filed at 10/26/2022 1531  Gross per 24 hour   Intake --   Output 900 ml   Net -900 ml          I had a face to face encounter and independently examined this patient  as outlined below:  PHYSICAL EXAM:  General: Alert, cooperative, appears stated age  Resp:  Mild crackles present, no wheezing, nonlabored  CV:  Regular  rhythm,  No edema  GI:  Soft, Non distended, Non tender. +Bowel sounds  Neurologic:  Alert and oriented X 3, normal speech, sensations are intact, pupils are reactive bilaterally  Psych:    Not anxious nor agitated  Skin:  Skin tears and wounds in various stages of healing on extremities  Extremities      Left AKA    Reviewed most current lab test results and cultures  YES  Reviewed most current radiology test results   YES  Review and summation of old records today    NO  Reviewed patient's current orders and MAR    YES  PMH/SH reviewed - no change compared to H&P  ________________________________________________________________________  Care Plan discussed with:    Comments   Patient x    Family      RN x    Care Manager     Consultant                       y Multidiciplinary team rounds were held today with , nursing, pharmacist and clinical coordinator. Patient's plan of care was discussed; medications were reviewed and discharge planning was addressed. ________________________________________________________________________    Procedures: see electronic medical records for all procedures/Xrays and details which were not copied into this note but were reviewed prior to creation of Plan. LABS:  I reviewed today's most current labs and imaging studies.   Pertinent labs include:  Recent Labs     10/26/22  1037 10/25/22  0605   WBC 12.1* 14.7*   HGB 8.9* 8.3*   HCT 29.0* 27.8*    220       Recent Labs     10/26/22  1037 10/25/22  0605 10/24/22  0510   * 136 134*   K 4.0 4.1 4.1   CL 96* 98 95*   CO2 34* 33* 34*   * 99 116*   BUN 8 10 10   CREA 0.67* 0.59* 0.57*   CA 9.3 8.6 8.8       Signed: Lizz Garcia MD

## 2022-10-27 NOTE — PERIOP NOTES
TRANSFER - OUT REPORT:    Verbal report given to Makenna Guerrero RN(name) on Romy Men  being transferred to 2184(unit) for routine post - op       Report consisted of patients Situation, Background, Assessment and   Recommendations(SBAR). Information from the following report(s) SBAR, Kardex, OR Summary, Intake/Output, MAR, and Cardiac Rhythm NSR  was reviewed with the receiving nurse. Opportunity for questions and clarification was provided.       Patient transported with:   Monitor  O2 @ 4 liters  Registered Nurse  [facility]

## 2022-10-27 NOTE — ANESTHESIA POSTPROCEDURE EVALUATION
* No procedures listed *.    general    Anesthesia Post Evaluation        Patient location during evaluation: PACU  Note status: Adequate. Level of consciousness: responsive to verbal stimuli and sleepy but conscious  Pain management: satisfactory to patient  Airway patency: patent  Anesthetic complications: no  Cardiovascular status: acceptable  Respiratory status: acceptable  Hydration status: acceptable  Comments: +Post-Anesthesia Evaluation and Assessment    Patient: Noemí Murphy MRN: [id number]  SSN: [id number]   YOB: 1965  Age: 61 y.o. Sex: male      Cardiovascular Function/Vital Signs    BP 95/66   Pulse 91   Temp 37 °C (98.6 °F)   Resp 12   Ht 6' (1.829 m)   Wt 103 kg (227 lb)   SpO2 94%   BMI 30.79 kg/m²     Patient is status post * No procedures listed *. Nausea/Vomiting: Controlled. Postoperative hydration reviewed and adequate. Pain:  Pain Scale 1: Numeric (0 - 10) (10/27/22 1645)  Pain Intensity 1: 0 (10/27/22 1645)   Managed. Neurological Status:   Neuro (WDL): Exceptions to WDL (10/24/22 1645)   At baseline. Mental Status and Level of Consciousness: Arousable. Pulmonary Status:   O2 Device: Nasal cannula (10/27/22 1645)   Adequate oxygenation and airway patent. Complications related to anesthesia: None    Post-anesthesia assessment completed. No concerns. Signed By: David Perea MD    10/27/2022  Post anesthesia nausea and vomiting:  controlled      INITIAL Post-op Vital signs:   Vitals Value Taken Time   BP 95/66 10/27/22 1645   Temp 37 °C (98.6 °F) 10/27/22 1645   Pulse 92 10/27/22 1647   Resp 11 10/27/22 1647   SpO2 95 % 10/27/22 1647   Vitals shown include unvalidated device data.

## 2022-10-27 NOTE — PROGRESS NOTES
Physical Therapy    Chart reviewed, patient is scheduled for kyphoplasty today so will defer and continue to follow for re-eval.      Fabiola Ramirez

## 2022-10-27 NOTE — PROGRESS NOTES
Patient has arrived to IR recovery at this time from [facility] room #2184. Patient has been consented by anesthesia. Pt awaiting to be consented by MD Eileen Calixto. Pt does not currently have PIV in place. Will work on PIV closer to table time which is now 13:30 p.m. pt is A&OX4, on 5L NC, and is having 10/10 back pain.

## 2022-10-27 NOTE — PROGRESS NOTES
Occupational Therapy    Chart reviewed in prep for skilled OT treatment, with new orders acknowledged however, pt already being followed x4/weekly. Per chart, pt scheduled for kyphoplasty this date. OT to hold treatment and follow up after procedure.     Thank you,  Jarett Ferrara, OT

## 2022-10-27 NOTE — ANESTHESIA PREPROCEDURE EVALUATION
Anesthetic History   No history of anesthetic complications            Review of Systems / Medical History  Patient summary reviewed, nursing notes reviewed and pertinent labs reviewed    Pulmonary    COPD: moderate      Smoker      Comments: 3 mm right lung nodule   Neuro/Psych     seizures  CVA  TIA and psychiatric history (Depression)    Comments: Thalamic pain syndrome    Hx Traumatic subarachnoid hemorrhage     Neurologic gait dysfunction Cardiovascular    Hypertension  Valvular problems/murmurs: tricuspid insufficiency and mitral insufficiency      Dysrhythmias       Exercise tolerance: <4 METS  Comments: ECG (10/16/22): Sinus tachycardia   When compared with ECG of 25-JUN-2021 14:21,   Nonspecific T wave abnormality no longer evident in Inferior leads     TTE (12/22/17): Left ventricle: Systolic function was normal. Ejection fraction was  estimated in the range of 55 % to 60 %. There were no regional wall motion  abnormalities. Mitral valve: There was mild regurgitation. Aortic valve: There was no stenosis. Tricuspid valve: There was mild regurgitation. Pulmonary artery systolic  pressure: 30 mmHg. Aorta, systemic arteries:  The root exhibited mild dilatation.      GI/Hepatic/Renal     GERD          Comments: Carbapenem resistant urinary tract infection  History of recent Gross catheter and also straight cath Endo/Other      Hypothyroidism  Obesity, cancer (Hx Bladder cancer s/p TURBT (8/10/18)) and anemia (Hgb = 10.4 on 10/24/22)     Other Findings   Comments: T12 inferior vertebral endplate compression fracture with acute low back pain     Chronic pain and narcotic use  On methadone    Debility secondary to CVA and recent left AKA    Gout           Physical Exam    Airway  Mallampati: II  TM Distance: > 6 cm  Neck ROM: normal range of motion   Mouth opening: Normal     Cardiovascular  Regular rate and rhythm,  S1 and S2 normal,  no murmur, click, rub, or gallop             Dental    Dentition: Poor dentition  Comments: Many missing and damaged teeth, several broken off at gumline with severe decay.    Pulmonary      Decreased breath sounds: bilateral          Comments: Poor air movement secondary to pain  Abdominal  GI exam deferred       Other Findings            Anesthetic Plan    ASA: 3  Anesthesia type: general    Monitoring Plan: BIS      Induction: Intravenous  Anesthetic plan and risks discussed with: Patient

## 2022-10-27 NOTE — PROGRESS NOTES
Speech therapy    Patient is scheduled for kyphoplasty today. Will defer and follow up for swallow treatment once appropriate. Susanna Leong M.S., CCC-SLP

## 2022-10-27 NOTE — PROGRESS NOTES
Transition of Care Plan:    RUR:1%  Disposition: home with home health SN and PT/OT  Follow up appointments:will need a PCP  DME needed: has hospital bed, electric sccande, DIPIKA, sliding board, grace per PT notes    Transportation at Discharge: Medical transport via [address] or means to access home:    patient    IM Medicare Letter: n/a  Is patient a  and connected with the [place]? A          If yes, was [employer] transfer form completed and VA notified? Caregiver Contact:Venkata Kaufmanre Ayana [phone]  Discharge Caregiver contacted prior to discharge? Per patient  Care Conference needed?:      no    Per PT patient continues to decline option of rehab - [facility] with [facility] - [phone]-   discussed need for new hospital bed order - has a bed with [facility] medical and needs a new one - orders sent as requested to order a new bed for patient via [employer] - confirmed receipt - CM left VM for Valdene Shone friend to inquire about name of patients PCP in order to secure home health services for patient - awaiting call back at this time - referrals sent to 15 agencies to try to secure home care services.   If unable to secure home health agency then will offer option of OP PT/OT - patient has had OP services with [facility] in the past. JUVENCIO Delacruz

## 2022-10-27 NOTE — PERIOP NOTES
Handoff Report from Operating Room to PACU    Report received from ADRI RN and Marisol Boo CRNA regarding Ilana Dung. * No surgeons listed *  And * No procedures listed *  confirmed   with allergies and dressings discussed. Anesthesia type, drugs, patient history, complications, estimated blood loss, vital signs, intake and output, and last pain medication and reversal medications were reviewed.

## 2022-10-28 ENCOUNTER — APPOINTMENT (OUTPATIENT)
Dept: GENERAL RADIOLOGY | Age: 57
DRG: 321 | End: 2022-10-28
Attending: STUDENT IN AN ORGANIZED HEALTH CARE EDUCATION/TRAINING PROGRAM
Payer: MEDICAID

## 2022-10-28 PROCEDURE — 74011250637 HC RX REV CODE- 250/637: Performed by: STUDENT IN AN ORGANIZED HEALTH CARE EDUCATION/TRAINING PROGRAM

## 2022-10-28 PROCEDURE — 77030021783 HC SYS CEM DEL MEDT -D

## 2022-10-28 PROCEDURE — 74011000250 HC RX REV CODE- 250: Performed by: STUDENT IN AN ORGANIZED HEALTH CARE EDUCATION/TRAINING PROGRAM

## 2022-10-28 PROCEDURE — 77010033678 HC OXYGEN DAILY

## 2022-10-28 PROCEDURE — 65270000046 HC RM TELEMETRY

## 2022-10-28 PROCEDURE — C1713 ANCHOR/SCREW BN/BN,TIS/BN: HCPCS

## 2022-10-28 PROCEDURE — 74011000258 HC RX REV CODE- 258: Performed by: INTERNAL MEDICINE

## 2022-10-28 PROCEDURE — 94640 AIRWAY INHALATION TREATMENT: CPT

## 2022-10-28 PROCEDURE — 94760 N-INVAS EAR/PLS OXIMETRY 1: CPT

## 2022-10-28 PROCEDURE — 74011250637 HC RX REV CODE- 250/637: Performed by: INTERNAL MEDICINE

## 2022-10-28 PROCEDURE — 94664 DEMO&/EVAL PT USE INHALER: CPT

## 2022-10-28 PROCEDURE — 97530 THERAPEUTIC ACTIVITIES: CPT

## 2022-10-28 PROCEDURE — 77030003666 HC NDL SPINAL BD -A

## 2022-10-28 PROCEDURE — 77030034843 HC TAMP SPN BN INFL EXP II KYPH -H

## 2022-10-28 PROCEDURE — 92526 ORAL FUNCTION THERAPY: CPT | Performed by: SPEECH-LANGUAGE PATHOLOGIST

## 2022-10-28 PROCEDURE — 2709999900 HC NON-CHARGEABLE SUPPLY

## 2022-10-28 PROCEDURE — 71045 X-RAY EXAM CHEST 1 VIEW: CPT

## 2022-10-28 PROCEDURE — 74011250636 HC RX REV CODE- 250/636: Performed by: STUDENT IN AN ORGANIZED HEALTH CARE EDUCATION/TRAINING PROGRAM

## 2022-10-28 PROCEDURE — 74011000250 HC RX REV CODE- 250: Performed by: INTERNAL MEDICINE

## 2022-10-28 PROCEDURE — 74011250636 HC RX REV CODE- 250/636: Performed by: INTERNAL MEDICINE

## 2022-10-28 RX ORDER — ALLOPURINOL 100 MG/1
100 TABLET ORAL DAILY
Status: DISCONTINUED | OUTPATIENT
Start: 2022-10-28 | End: 2022-11-20 | Stop reason: HOSPADM

## 2022-10-28 RX ORDER — IPRATROPIUM BROMIDE AND ALBUTEROL SULFATE 2.5; .5 MG/3ML; MG/3ML
3 SOLUTION RESPIRATORY (INHALATION)
Status: DISCONTINUED | OUTPATIENT
Start: 2022-10-28 | End: 2022-11-01

## 2022-10-28 RX ORDER — ACETYLCYSTEINE 200 MG/ML
600 SOLUTION ORAL; RESPIRATORY (INHALATION)
Status: DISCONTINUED | OUTPATIENT
Start: 2022-10-28 | End: 2022-10-28

## 2022-10-28 RX ORDER — COLCHICINE 0.6 MG/1
0.6 TABLET ORAL DAILY
Status: DISCONTINUED | OUTPATIENT
Start: 2022-10-28 | End: 2022-11-20 | Stop reason: HOSPADM

## 2022-10-28 RX ORDER — COLCHICINE 0.6 MG/1
1.2 TABLET ORAL ONCE
Status: COMPLETED | OUTPATIENT
Start: 2022-10-28 | End: 2022-10-29

## 2022-10-28 RX ORDER — ACETYLCYSTEINE 200 MG/ML
600 SOLUTION ORAL; RESPIRATORY (INHALATION)
Status: DISCONTINUED | OUTPATIENT
Start: 2022-10-28 | End: 2022-10-28 | Stop reason: SDUPTHER

## 2022-10-28 RX ORDER — ALLOPURINOL 300 MG/1
1 TABLET ORAL
COMMUNITY

## 2022-10-28 RX ORDER — ACETYLCYSTEINE 200 MG/ML
600 SOLUTION ORAL; RESPIRATORY (INHALATION)
Status: DISCONTINUED | OUTPATIENT
Start: 2022-10-28 | End: 2022-10-30

## 2022-10-28 RX ADMIN — GABAPENTIN 100 MG: 300 CAPSULE ORAL at 08:02

## 2022-10-28 RX ADMIN — SODIUM CHLORIDE, PRESERVATIVE FREE 10 ML: 5 INJECTION INTRAVENOUS at 13:57

## 2022-10-28 RX ADMIN — OXYCODONE 15 MG: 5 TABLET ORAL at 21:18

## 2022-10-28 RX ADMIN — METHADONE HYDROCHLORIDE 60 MG: 10 CONCENTRATE ORAL at 08:02

## 2022-10-28 RX ADMIN — GENTAMICIN SULFATE 440 MG: 40 INJECTION, SOLUTION INTRAMUSCULAR; INTRAVENOUS at 13:57

## 2022-10-28 RX ADMIN — GABAPENTIN 100 MG: 300 CAPSULE ORAL at 21:18

## 2022-10-28 RX ADMIN — DOXAZOSIN MESYLATE 2 MG: 2 TABLET ORAL at 08:02

## 2022-10-28 RX ADMIN — OXYCODONE 15 MG: 5 TABLET ORAL at 11:44

## 2022-10-28 RX ADMIN — SODIUM CHLORIDE, PRESERVATIVE FREE 10 ML: 5 INJECTION INTRAVENOUS at 21:18

## 2022-10-28 RX ADMIN — CEFDINIR 300 MG: 300 CAPSULE ORAL at 21:18

## 2022-10-28 RX ADMIN — OXYCODONE 15 MG: 5 TABLET ORAL at 02:48

## 2022-10-28 RX ADMIN — ENOXAPARIN SODIUM 30 MG: 100 INJECTION SUBCUTANEOUS at 08:02

## 2022-10-28 RX ADMIN — METRONIDAZOLE 500 MG: 250 TABLET ORAL at 08:02

## 2022-10-28 RX ADMIN — CASTOR OIL AND BALSAM, PERU: 788; 87 OINTMENT TOPICAL at 09:00

## 2022-10-28 RX ADMIN — CEFDINIR 300 MG: 300 CAPSULE ORAL at 08:02

## 2022-10-28 RX ADMIN — GABAPENTIN 100 MG: 300 CAPSULE ORAL at 15:01

## 2022-10-28 RX ADMIN — METRONIDAZOLE 500 MG: 250 TABLET ORAL at 21:17

## 2022-10-28 RX ADMIN — CASTOR OIL AND BALSAM, PERU: 788; 87 OINTMENT TOPICAL at 21:31

## 2022-10-28 RX ADMIN — OXYCODONE 15 MG: 5 TABLET ORAL at 08:02

## 2022-10-28 RX ADMIN — SODIUM CHLORIDE, PRESERVATIVE FREE 10 ML: 5 INJECTION INTRAVENOUS at 06:55

## 2022-10-28 RX ADMIN — PANTOPRAZOLE SODIUM 40 MG: 40 TABLET, DELAYED RELEASE ORAL at 08:02

## 2022-10-28 RX ADMIN — OXYCODONE 15 MG: 5 TABLET ORAL at 15:01

## 2022-10-28 RX ADMIN — OXYCODONE 15 MG: 5 TABLET ORAL at 18:10

## 2022-10-28 RX ADMIN — ALLOPURINOL 100 MG: 100 TABLET ORAL at 17:31

## 2022-10-28 RX ADMIN — TAMSULOSIN HYDROCHLORIDE 0.4 MG: 0.4 CAPSULE ORAL at 08:02

## 2022-10-28 RX ADMIN — LEVOTHYROXINE SODIUM 125 MCG: 0.12 TABLET ORAL at 08:02

## 2022-10-28 RX ADMIN — ACETAMINOPHEN 650 MG: 325 TABLET ORAL at 15:01

## 2022-10-28 RX ADMIN — CARVEDILOL 3.12 MG: 3.12 TABLET, FILM COATED ORAL at 16:19

## 2022-10-28 RX ADMIN — IPRATROPIUM BROMIDE AND ALBUTEROL SULFATE 3 ML: .5; 3 SOLUTION RESPIRATORY (INHALATION) at 21:39

## 2022-10-28 RX ADMIN — CARVEDILOL 3.12 MG: 3.12 TABLET, FILM COATED ORAL at 08:02

## 2022-10-28 NOTE — PROGRESS NOTES
Comprehensive Nutrition Assessment    Type and Reason for Visit: Reassess    Nutrition Recommendations/Plan:   Continue easy to chew diet  Recommend using PRN miralax if no BM today  Please document % meals and supplements consumed in flowsheet I/O's under intake      Malnutrition Assessment:  Malnutrition Status:  Insufficient data (10/24/22 1558)      Nutrition Assessment:    Chart reviewed, med noted for compression fracture, lower back pain, UTI, pneumonia. S/p kyphoplasty on 10/27. Visit was brief d/t pt being in pain. Previously NPO for procedure, dietitian advanced diet to easy to chew per SLP recommendation. Noted no BM since 10/23. If no BM by the end of the day, recommend using PRN miralax. Patient Vitals for the past 168 hrs:   % Diet Eaten   10/28/22 1413 76 - 100%   10/27/22 1830 1 - 25%   10/25/22 0850 0%   10/24/22 1800 1 - 25%   10/23/22 1800 76 - 100%     Nutrition Related Findings:    BM 10/23; Meds: synthroid; Labs: reviewed Wound Type: Pressure injury, Skin tears    Current Nutrition Intake & Therapies:  Average Meal Intake: NPO  Average Supplement Intake: None ordered  DIET ONE TIME MESSAGE  ADULT DIET Easy to Chew    Anthropometric Measures:  Height: 6' (182.9 cm)  Ideal Body Weight (IBW): 178 lbs (81 kg)     Current Body Wt:  103 kg (227 lb 1.2 oz), 127.6 % IBW. Not specified  Current BMI (kg/m2): 30.8        Weight Adjustment: No adjustment                 BMI Category: Obese class 1 (BMI 30.0-34. 9)    Estimated Daily Nutrient Needs:  Energy Requirements Based On: Formula  Weight Used for Energy Requirements: Current  Energy (kcal/day): 1972 kcals (BMR 1893 x 1. 2AF -300kcals)  Weight Used for Protein Requirements: Ideal  Protein (g/day): 81-97g (1-1.2g/kg ibw)  Method Used for Fluid Requirements: 1 ml/kcal  Fluid (ml/day): 2000 ml    Nutrition Diagnosis:   No nutrition diagnosis at this time     Nutrition Interventions:   Food and/or Nutrient Delivery: Start oral diet  Nutrition Education/Counseling: No recommendations at this time  Coordination of Nutrition Care: Continue to monitor while inpatient       Goals:     Goals: PO intake 75% or greater, by next RD assessment       Nutrition Monitoring and Evaluation:   Behavioral-Environmental Outcomes: None identified  Food/Nutrient Intake Outcomes: Food and nutrient intake  Physical Signs/Symptoms Outcomes: Weight, Biochemical data, Skin, Nausea/vomiting    Discharge Planning:    Continue current diet    Gregory Mathew

## 2022-10-28 NOTE — PROGRESS NOTES
Upon arrival, patient on 5L NC. Expressed the pain he was having in his back. Patient didn't feel up to doing flutter valve or neb treatments at this time but would try later. Respiratory will continue to follow.

## 2022-10-28 NOTE — PROGRESS NOTES
Problem: Mobility Impaired (Adult and Pediatric)  Goal: *Acute Goals and Plan of Care (Insert Text)  Description: FUNCTIONAL STATUS PRIOR TO ADMISSION: Pt lives at home, had been alone but recently since amputation his friend has been staying with him. Pt pt's request, talked with friend on phone to get status PTA. She stated pt was functioning at w/c level with an electric scooter; needed SBA for either pivot transfer or sliding board transfer (does have grace but she states he did better without); needed help with dressing and uses wipes for bathing because he could not get onto shower chair any longer. She states he was going to OPPT[facility] at [facility]. She states they were apparently working on getting him a care aide. Pt also states that he has a  for his L AKA (5 weeks ago) but it no longer fits. He was unable to fill in where he got it. HOME SUPPORT PRIOR TO ADMISSION: The patient lived alone with friend to provide assistance. Physical Therapy Goals  Initiated 10/17/2022  1. Patient will move from supine to sit and sit to supine , scoot up and down, and roll side to side in bed with modified independence within 7 day(s). 2.  Patient will transfer from bed to chair and chair to bed with minimal assistance/contact guard assist using the least restrictive device within 7 day(s). 3.  Patient will show good sitting balance static and dynamic for safe bed mobility and transfers within 7 days. Physical Therapy Goals  Re-Assessed 10/28/2022  1. Patient will move from supine to sit and sit to supine , scoot up and down, and roll side to side in bed with minimal assistance within 7 day(s). 2.  Patient will transfer from bed to chair and chair to bed with moderate assist using the least restrictive device within 7 day(s). 3.  Patient will show good sitting balance static and dynamic for safe bed mobility and transfers within 7 days.   4.  Patient will demonstrate 5 exercises without verbal cueing to improve UE/LE ROM and strength within 7 days. Outcome: Not Progressing Towards Goal   PHYSICAL THERAPY TREATMENT: WEEKLY REASSESSMENT  Patient: Guy Schreiber (42 y.o. male)  Date: 10/28/2022  Primary Diagnosis: COPD with acute exacerbation ([facility] [J44.1]       Precautions:   Fall, Contact (ESBL wound)      ASSESSMENT  Patient continues with skilled PT services and is not progressing towards goals. Pt underwent kyphoplasty yesterday though reports no improvement in back pain. Pt actually states it is worse today and now complaining of abdominal and chest pain. Pt was premedicated prior to session. Today he only tolerates repositioning in bed and requires Max A for rolling R and L for linen changes. Pt moaning throughout session and very unkempt in bed. Gown off, sheet soiled, urinal caps under pt, soiled chux, food/drink trash in bed. Pt has decline therapy last 5 attempts and offers very little effort this day. Will likely require LTC in the near future. Recommend SNF. Patient's progression toward goals since last assessment: no progress towards goals    Current Level of Function Impacting Discharge (mobility/balance): near total care    Functional Outcome Measure: The patient scored 15/100 on the Barthel outcome measure which is indicative of total dependence with ADLs and mobility. Other factors to consider for discharge: medical complexity, home support         PLAN :  Goals have been updated based on progression since last assessment. Patient continues to benefit from skilled intervention to address the above impairments. Recommendations and Planned Interventions: bed mobility training, transfer training, gait training, therapeutic exercises, neuromuscular re-education, patient and family training/education, and therapeutic activities      Frequency/Duration: Patient will be followed by physical therapy:  3 times a week to address goals.     Recommendation for discharge: (in order for the patient to meet his/her long term goals)  Therapy up to 5 days/week in SNF setting    This discharge recommendation:  Has been made in collaboration with the attending provider and/or case management    IF patient discharges home will need the following DME: to be determined (TBD)         SUBJECTIVE:   Patient stated I hurt too much to do anything.     OBJECTIVE DATA SUMMARY:   HISTORY:    Past Medical History:   Diagnosis Date    Aneurysm ([address]     (with stroke) brain    Bladder tumor     Cancer ([address]     bladder CA    Chronic pain     Family history of bladder cancer     GERD (gastroesophageal reflux disease)     Gout     History of vascular access device 04/25/2019    [facility] VAT 4 FR MIDLINE R Cephalic Limited access    History of vascular access device 04/26/2019    4 fr Midline right Cephalic midline access    Hypercholesterolemia     Hypertension     Lesion of bladder     Seizures ([address]     Stroke ([address] 2006    left sided weakness    Thromboembolus ([address]     left leg    Thyroid disease     TIA (transient ischemic attack) 2012     Past Surgical History:   Procedure Laterality Date    HX ORTHOPAEDIC      R arthroscopy    HX OTHER SURGICAL      x2 L foot    HX UROLOGICAL  04/20/2018    Cystoscopy, TURBT (greater than 5 cm resected) Dr. Bahman Sims, [facility].     IR KYPHOPLASTY THORACIC  10/27/2022    KNEE ARTHROSCP HARV      left knee    TRANSURETHRAL RESEC BLADDER NECK  08/10/2018       Personal factors and/or comorbidities impacting plan of care: cancer, stroke with hemiplegia, seizures, blood clots, AKA x 5 weeks ago    [address]: Apartment  # Steps to Enter: 0  One/Two Story Residence: One story  Living Alone: Yes (friend with him right now assisting with transfers and dressing)  Support Systems: Friend/Neighbor  Patient Expects to be Discharged to[de-identified] Home with home health (Rehab vs home health)  Current DME Used/Available at Home: Wheelchair, power, Hospital bed, Shower chair, Wheelchair, Commode, bedside (sliding board, electric scooter, grace)  Tub or Shower Type: Tub/Shower combination    EXAMINATION/PRESENTATION/DECISION MAKING:   Critical Behavior:  Neurologic State: Alert  Orientation Level: Oriented X4  Cognition: Appropriate safety awareness  Safety/Judgement: Awareness of environment  Hearing: Auditory  Auditory Impairment: Hard of hearing, bilateral  Skin:    Edema:   Range Of Motion:  AROM: Grossly decreased, non-functional           PROM: Grossly decreased, non-functional           Strength:    Strength: Grossly decreased, non-functional                    Tone & Sensation:   Tone: Abnormal                              Coordination:  Coordination: Generally decreased, functional  Vision:      Functional Mobility:  Bed Mobility:  Rolling: Maximum assistance;Assist x1           Transfers:                             Balance:      Ambulation/Gait Training:                                                   Functional Measure:  Barthel Index:    Bathin  Bladder: 5  Bowels: 5  Groomin  Dressin  Feedin  Mobility: 0  Stairs: 0  Toilet Use: 0  Transfer (Bed to Chair and Back): 0  Total: 15/100       The Barthel ADL Index: Guidelines  1. The index should be used as a record of what a patient does, not as a record of what a patient could do. 2. The main aim is to establish degree of independence from any help, physical or verbal, however minor and for whatever reason. 3. The need for supervision renders the patient not independent. 4. A patient's performance should be established using the best available evidence. Asking the patient, friends/relatives and nurses are the usual sources, but direct observation and common sense are also important. However direct testing is not needed. 5. Usually the patient's performance over the preceding 24-48 hours is important, but occasionally longer periods will be relevant.   6. Middle categories imply that the patient supplies over 50 per cent of the effort. 7. Use of aids to be independent is allowed. Score Interpretation (from [address] [facility] [address])    Independent   60-79 Minimally independent   40-59 Partially dependent   20-39 Very dependent   <20 Totally dependent     -Danyelle Good., Barthel, D.W. (1965). Functional evaluation: the Barthel Index. [facility] ([address] (1997). The Barthel activities of daily living index: self-reporting versus actual performance in the old (> or = 75 years). Journal of [address] [place] [address] 45(7), [facility], J.[email or web address], Hal Eugene., Mauro Sole. (1999). Measuring the change in disability after inpatient rehabilitation; comparison of the responsiveness of the Barthel Index and Functional [place] Measure. Journal of Neurology, Neurosurgery, and Psychiatry, 66(4), 879-277. Molly Carr, NArnulfoJ.SILKE, ELEANOR Hines, & Joelle Alonso MANGEL. (2004) Assessment of post-stroke quality of life in cost-effectiveness studies: The usefulness of the Barthel Index and the EuroQoL-5D. Quality of Life Research, 13, 427-43           Pain Rating:      Activity Tolerance:   Poor    After treatment patient left in no apparent distress:   Supine in bed and Call bell within reach    COMMUNICATION/EDUCATION:   The patients plan of care was discussed with: Registered nurse. Fall prevention education was provided and the patient/caregiver indicated understanding., Patient/family have participated as able in goal setting and plan of care. , and Patient/family agree to work toward stated goals and plan of care.     Thank you for this referral.  Owen Enrique, PT   Time Calculation: 11 mins

## 2022-10-28 NOTE — PROGRESS NOTES
Transition of Care Plan:    RUR: 16%  Disposition: home with home health vs OP PT/OT with [facility]    [facility] willing to accept if able to secure an ordering MD to follow    Per [facility] insurance CM - [facility] patient has medicaid personal care aide services ready to start upon discharge to home    Follow up appointments: Vernon Bedoya friend states patient has a new PCP appt on Nov but has  not provided this info to CM  DME needed: ?? Hospital bed   Transportation at Discharge: [address] [place] [address] or means to access home:    patient    IM Medicare Letter:n/a  Is patient a [place] and connected with the [place]? If yes, was [employer] transfer form completed and VA notified? Caregiver Contact: Debo Sylvester [phone]  Discharge Caregiver contacted prior to discharge?   Per patient  Care Conference needed?:    no    11:30 am CM spoke with patient this am- still declines SNF rehab - agreeable to skilled home [facility] - explained will need PCP/MD to sign for him to receive home health services- back up plan would be OP PT/OT with [facility] - CM spoke with patient and Rose Friend about bed- they state his current bed is \"broken\"with thin mattress and he has had it for months and has made his condition worse at home -  she was made aware by [employer] insurance CM - [phone] that new script was sent to [facility] to try to assist a bed as the prescription will  - Vernon Bedoya has cancelled the order and having the bed being picked up on Monday - wants to use another [facility] (per [address] [facility] [address]   (this company may not accept r/t insurance) -  requested for Rose to provide this info to CM - she declined stating she wanted to reach to CM at [facility] to obtain the order for specific bed orders- CM asked her to have the CM fax the information to this CM and fax number provided  - SHERRI also asked for PCP info that she states kj has an appt with on (this info was requested previously as well) and she states she does not have this info and will call CM back with this info - stated needs an MD to follow for home care- she provided kj's rehab MD that follows him - Dr Donnell Ruelas at [address] [facility]# [phone] - CM provided this info to [facility]  to see if this MD would follow patient- CM then stated would still need PCP info for back up. CM explained that it is difficult to find available beds at participating providers for paitents going home and that CM willing to attempt [facility] [employer] but if the company has too many barriers and is not able to service patient in a timely manner then CM would seek another company that would be able to provide a semi-electric for patient at home for a timely discharge. Aaliyah Betancourt stated \"You can't discharge him home without a bed\" CM asked her to obtain information to CM as patient to be discharged soon. 3:15 pm - Call placed to Aaliyah Betancourt regarding follow-up on needed info of company for ordering a bed from [facility] and the PCP information and that CM needed that information by 4 pm today so it could be ordered before end of day. Aaliyah Betancourt explained wants order from [facility] and would still not provide vendor contact information. Rose to call insurance . CM spoke with Shruti Breaux at end of the day and the contact with [employer] [facility] [employer] is [employer] at [phone]. CM staff may followup on Monday.   JUVENCIO Boo

## 2022-10-28 NOTE — CONSULTS
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  Status post T5 and T6 kyphoplasty 10/27/2022  T12 vertebral endplate compression fracture  CRE UTI  Chronic pain on methadone  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  Generalized debility  GERD, hypertension, hypothyroidism  Tobacco use      RECOMMENDATIONS:   O2  Will add bronchodilators  Will add mucolytics  Pulmonary toilet including incentive spirometry and Acapella valve, though the patient will need some improvements in pain control to help him improve his cough  Repeat chest x-ray after he has had some treatment  Bronchoscopy could be considered if mucous plugging is recalcitrant, but we will try medical therapy first  He is already on antibiotics  Tobacco cessation counseling  Will need a follow up CT in 1 year due to his 3 mm pulmonary nodule (and as routine screening for tobacco users)  Other issues per primary team     Subjective: This patient has been seen and evaluated at the request of Dr. Najma Arce for persistent O2 requirement.  Patient is a 61 y.o. 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 He had severe back pain at that time and was found to have a T12 inferior vertebral endplate compression fracture. He has had a prolonged hospital stay and had a kyphoplasty of T5 and T6 performed yesterday. During his hospitalization he has had an oxygen requirement which prompted consult to me today. He was treated for COPD exacerbation on admission with 5 days of prednisone, but he has only had 1 albuterol treatment in the last 10 days. He cannot cough because he is limited by pain. He reports that he has chronic issues with aspiration because of his stroke. Past Medical History:   Diagnosis Date    Aneurysm ([address]     (with stroke) brain    Bladder tumor     Cancer ([address]     bladder CA    Chronic pain     Family history of bladder cancer     GERD (gastroesophageal reflux disease)     Gout     History of vascular access device 04/25/2019    [facility] VAT 4 FR MIDLINE R Cephalic Limited access    History of vascular access device 04/26/2019    4 fr Midline right Cephalic midline access    Hypercholesterolemia     Hypertension     Lesion of bladder     Seizures ([address]     Stroke ([address] 2006    left sided weakness    Thromboembolus ([address]     left leg    Thyroid disease     TIA (transient ischemic attack) 2012      Past Surgical History:   Procedure Laterality Date    HX ORTHOPAEDIC      R arthroscopy    HX OTHER SURGICAL      x2 L foot    HX UROLOGICAL  04/20/2018    Cystoscopy, TURBT (greater than 5 cm resected) Dr. Justin Garrido, [facility]. IR KYPHOPLASTY THORACIC  10/27/2022    KNEE ARTHROSCP HARV      left knee    TRANSURETHRAL RESEC BLADDER NECK  08/10/2018      Prior to Admission medications    Medication Sig Start Date End Date Taking? Authorizing Provider   carvediloL (COREG) 6.25 mg tablet Take 6.25 mg by mouth two (2) times daily (with meals). Yes Provider, Historical   doxazosin (CARDURA) 2 mg tablet Take 2 mg by mouth daily.    Yes Provider, Historical   pantoprazole (PROTONIX) 40 mg tablet Take 40 mg by mouth daily. Yes Provider, Historical   metFORMIN ER (GLUCOPHAGE XR) 500 mg tablet Take 500 mg by mouth daily. 9/27/22  Yes Provider, Historical   predniSONE (DELTASONE) 20 mg tablet Take 20 mg by mouth daily. 9/27/22  Yes Provider, Historical   tamsulosin (FLOMAX) 0.4 mg capsule Take 1 capsule by mouth daily after dinner 3/11/22  Yes Andrews Thomas MD   levothyroxine (SYNTHROID) 125 mcg tablet Take 125 mcg by mouth Daily (before breakfast). Yes Provider, Historical   ibuprofen (MOTRIN) 200 mg tablet Take 400 mg by mouth every six (6) hours as needed for Pain. Yes Provider, Historical   gabapentin (NEURONTIN) 100 mg capsule Take 200 mg by mouth three (3) times daily. Yes Provider, Historical   methadone (DOLOPHINE) 10 mg/mL solution Take 60 mg by mouth daily. Indications: excessive pain   Yes Provider, Historical   colchicine 0.6 mg tablet Take 0.6 mg by mouth daily as needed for Gout or Pain. Indications: acute inflammation of the joints due to gout attack    Provider, Historical     Allergies   Allergen Reactions    Sulfamethoxazole-Trimethoprim Rash     L eye and L hand    Ciprofloxacin Hives     Per pcp records    Codeine Hives     Tolerates dilaudid, oxycodone    Cymbalta [Duloxetine] Other (comments)     Confusion and memory loss    Lyrica [Pregabalin] Hives    Sulfa (Sulfonamide Antibiotics) Rash    Ultram [Tramadol] Hives    Vancomycin Shortness of Breath    Zosyn [Piperacillin-Tazobactam] Rash      Social History     Tobacco Use    Smoking status: Every Day     Packs/day: 0.50     Types: Cigarettes    Smokeless tobacco: Never    Tobacco comments:     instructed not to smoke 24 hrs prior surgery   Substance Use Topics    Alcohol use:  Yes     Alcohol/week: 6.0 standard drinks     Types: 6 Cans of beer per week     Comment: occasional      Family History   Problem Relation Age of Onset    Diabetes Father     Lung Disease Father     Diabetes Sister     Diabetes Brother     Cancer Paternal Grandfather         Bladder        Current Facility-Administered Medications   Medication Dose Route Frequency    cefdinir (OMNICEF) capsule 300 mg  300 mg Oral Q12H    metroNIDAZOLE (FLAGYL) tablet 500 mg  500 mg Oral Q12H    sodium chloride (NS) flush 5-40 mL  5-40 mL IntraVENous Q8H    balsam peru-castor oiL (VENELEX) ointment   Topical BID    carvediloL (COREG) tablet 3.125 mg  3.125 mg Oral BID WITH MEALS    doxazosin (CARDURA) tablet 2 mg  2 mg Oral DAILY    pantoprazole (PROTONIX) tablet 40 mg  40 mg Oral ACB    enoxaparin (LOVENOX) injection 30 mg  30 mg SubCUTAneous Q12H    fentaNYL (DURAGESIC) 12 mcg/hr patch 1 Patch  1 Patch TransDERmal Q72H    gabapentin (NEURONTIN) capsule 100 mg  100 mg Oral TID    levothyroxine (SYNTHROID) tablet 125 mcg  125 mcg Oral ACB    methadone (DOLOPHINE) 10 mg/mL concentrated solution 60 mg  60 mg Oral DAILY    tamsulosin (FLOMAX) capsule 0.4 mg  0.4 mg Oral DAILY    sodium chloride (NS) flush 5-40 mL  5-40 mL IntraVENous Q8H    lidocaine 4 % patch 2 Patch  2 Patch TransDERmal Q24H       Review of Systems:  A comprehensive review of systems was negative except for that written in the HPI. Objective:   Vital Signs:    Visit Vitals  /72   Pulse 88   Temp 97.6 °F (36.4 °C)   Resp 16   Ht 6' (1.829 m)   Wt 103 kg (227 lb)   SpO2 92%   BMI 30.79 kg/m²       O2 Device: Nasal cannula   O2 Flow Rate (L/min): 4 l/min   Temp (24hrs), Av.3 °F (36.8 °C), Min:97.6 °F (36.4 °C), Max:98.8 °F (37.1 °C)       Intake/Output:   Last shift:      10/28 07 - 10/28 1900  In: 600 [P.O.:600]  Out: 275 [Urine:275]  Last 3 shifts: 10/26 1901 - 10/28 0700  In: 6689 [P.O.:240;  I.V.:800]  Out: 2150 [Urine:2150]    Intake/Output Summary (Last 24 hours) at 10/28/2022 8269  Last data filed at 10/28/2022 1413  Gross per 24 hour   Intake 1540 ml   Output 2425 ml   Net -885 ml      Physical Exam:   General:  Alert, cooperative, no distress    Head:  Normocephalic, without obvious abnormality, atraumatic. Eyes:  Conjunctivae/corneas clear. Nose: Nares normal. Septum midline. Mucosa normal.    Throat: Lips, mucosa, and tongue normal.     Neck: Supple, symmetrical, trachea midline    Back:   Unable to examine due to pain with movement   Lungs:   Near-absent breath sounds on left    Chest wall:  No tenderness or deformity. Heart:  Regular rate and rhythm    Abdomen:   Soft, non-tender. Bowel sounds normal.     Extremities: Extremities normal, atraumatic, no cyanosis, left AKA   Skin: Skin color, texture, turgor normal. No rashes or lesions   Lymph nodes: Cervical, supraclavicular nodes normal.   Neurologic: Grossly nonfocal     Data review:   No results found for this or any previous visit (from the past 24 hour(s)).     Imaging:  I have personally reviewed the patients radiographs and have reviewed the reports:  Near complete opacification of the left lung with tracheal deviation to the left, consistent with atelectasis mucous plugging        Javid Garcia MD

## 2022-10-28 NOTE — PROGRESS NOTES
Problem: Dysphagia (Adult)  Goal: *Acute Goals and Plan of Care (Insert Text)  Description: Speech Pathology Goals  Initiated 10/26/2022    1. Patient will tolerate Easy to Chew/Thin Liquid diet without signs of aspiration or adverse effects within 7 days. Outcome: Progressing Towards Goal       SPEECH LANGUAGE PATHOLOGY DYSPHAGIA TREATMENT  Patient: Codi Inman (24 y.o. male)  Date: 10/28/2022  Diagnosis: COPD with acute exacerbation ([facility] [J44.1] <principal problem not specified>      Precautions:  Fall, Contact (ESBL wound)    ASSESSMENT:  Patient seen in follow up and pain was a barrier to session. Has been NPO since surgery, RN getting ready to resume diet. Pain was a barrier this session and patient ate a small amount but had difficulty maintaining upright position and had low appetite. Reported extensive swallow hx including tx at [facility] and has had PEG tube in past. May be contributing to his current CXR, if this is the case has likely been recently chronic. Will follow up to pursue this when he is more comfortable. PLAN:  Recommendations and Planned Interventions:  -Resume easy to chew regular diet, thin liquids  -Once pain is more controlled, we will consider objective eval of swallow given his hx of dysphagia with PEG tube and current medical status. Patient will need to be better able to participate before proceeding with this.   -Patient continues to benefit from skilled intervention to address the above impairments. Continue treatment per established plan of care. Discharge Recommendations: To Be Determined     SUBJECTIVE:   Patient stated That girl at [facility] spent a lot of time with me.  I wish I remembered her name re: therapist that helped him with swallow    OBJECTIVE:   Cognitive and Communication Status:  Neurologic State: Alert  Orientation Level: Oriented X4  Cognition: Appropriate safety awareness  Perception: Appears intact  Perseveration: Perseverates during conversation (perseverates on pain but not inappropriate. Repeats story from this morning)  Safety/Judgement: Awareness of environment  Dysphagia Treatment:  Oral Assessment:  Oral Assessment  Labial: No impairment  Dentition: Natural;Partials (comment)  Oral Hygiene: clean  Lingual: No impairment  P.O. Trials:  Patient Position: upright in bed with encouragement  Vocal quality prior to P.O.: Gllottal newell;Strain  Consistency Presented: Puree; Solid; Thin liquid        Bolus Acceptance: No impairment  Bolus Formation/Control: No impairment        Oral Residue: None     Laryngeal Elevation: Functional  Aspiration Signs/Symptoms: Infiltrate on chest xray  Pharyngeal Phase Characteristics: No impairment, issues, or problems                                                                                                                  Pain:      9/10 with pain behaviors throughout. Pain was a barrier to session. After treatment:   Patient left in no apparent distress in bed and staff present    COMMUNICATION/EDUCATION:   Patient was educated regarding his deficit(s) of dysphagia as this relates to his diagnosis of COPD. He demonstrated Good understanding as evidenced by his responses. The patient's plan of care including recommendations, planned interventions, and recommended diet changes were discussed with: Registered nurse.      ANTWAN Sharma  Time Calculation: 21 mins

## 2022-10-28 NOTE — PROGRESS NOTES
Occupational Therapy    Chart reviewed in prep for skilled OT treatment; however, pt declines engagement, stating he is in too much pain. Pt educated on safe spinal technique, to prevent bending/twisting and verbalizes good understanding. OT to defer and follow up later as able and appropriate.     Thank you,  Mary Vizcaino, OT

## 2022-10-28 NOTE — PROGRESS NOTES
Hospitalist Progress Note    NAME: Ed Castillo   :  1965   MRN:  [id number]     Ed Castillo is a 61 y.o.   male with past medical history as listed below who presents with complaints of severe back pain reportedly under shoulder blades reportedly 10/10 pain that is constant/aching as well as mild worsening of baseline shortness of breath secondary to underlying COPD. Assessment / Plan:  T12 inferior vertebral endplate compression fracture  Acute low back pain due to above  -CT abdomen shows possibility of T12 inferior vertebral endplate compression fracture. -MRI shows evidence of acute T12 fracture. - kyphoplasty performed 10/27/22  - discussed with IR, procedure should be relatively immediately curing of pain related to location of procedure  - monitor pain. If persistent or with point tenderness, may obtain another MRI  -Pain control with fentanyl patch, methadone 60 mg daily  - increased oxycodone to 15 mg every 3 hours as needed. Patient shared that he was on 30mg q3h for 2 years previously and that he does not wish to be taking that high of doses in the future. Is aware of their addictive qualities. - PT -> SNF    Carbapenem resistant urinary tract infection  History of recent Gross catheter and also straight cath  -Urine culture grew Carbapenem resistant Klebsiella pneumonia  -Based on urine culture and sensitivity, currently on gentamicin  - consulted ID 10/26/22, continue Gentamicin through 10/28/22, 10 day total course    Suspected aspiration pneumonia  Acute hypoxic respiratory failure  -MRI shows evidence of aspiration pneumonia.   Chest x-ray also shows basilar infiltrates with possible effusion  -He is also reporting some worsening of shortness of breath along with cough and small amount of phlegm  -started empiric antibiotics with cefepime and Flagyl, started 10/25/22  - changed to Cefdinir flagyl, need duration for discharge  -Aspiration precautions.  -We will change to dysphagia diet. We will consult speech therapy, appreciate assistance  -Has leukocytosis of 14,000 but he was recently on steroids which may be contributing. Procal normal  -Continue oxygen nasal cannula, Wean as tolerated  - CXR 10/28/22 showed L hemithorax opacity possible LLL collapse, L pleural effusion  - incentive spirometery  - consulted Pulmonology for persistent O2 requirement, added bronchodilators and mucolytics  - repeat CXR on 10/30  - followup CT in 1 year for 3mm pulm nodule    Possible gout flare  Describes knee pain, similar to previous gout flare  - started colchicine 1.2 x1, 0.6 x1, then 0.6 daily  - started allopurinol 100 daily. Was prescribed 300 daily but not taking consistently, so essentially starting over    Chronic pain on methadone  -Continue home methadone. Rest of pain meds as above    Debility secondary to CVA and recent left AKA  -Continue PT OT    Chronic wounds  -Continue wound care    Mild COPD exacerbation  Schedule DuoNebs every 6 hours x24 hours with every 4 hours as needed breakthrough nebs  S/p prednisone    Leukocytosis likely due to steroids  -WBC 14k. He is afebrile    GERD  Protonix daily     Essential hypertension  Continue PTA Coreg     Hypothyroidism  Continue PTA Synthroid     Incidental findings requiring outpatient follow up/ evaluation:  3 mm right lung nodule. -Guidelines recommend repeat scan in 12 months  Increase in size of intermediate density left renal lesion. Recommend  follow-up with ultrasound in 6 months. Left AKA    30.0 - 39.9 Obese / Body mass index is 30.79 kg/m². Estimated discharge date: 10/30  Barriers: pain control, improvement in resp status    Code status: Full  Prophylaxis: Lovenox  Recommended Disposition: SNF     Subjective:   Patient with pain in his back, knee, ankle. Endorses SOB. No other complaints. Objective:     VITALS:   Last 24hrs VS reviewed since prior progress note.  Most recent are:  Patient Vitals for the past 24 hrs:   Temp Pulse Resp BP SpO2   10/28/22 1744 -- -- -- -- 94 %   10/28/22 1500 98.9 °F (37.2 °C) 83 15 (!) 103/55 95 %   10/28/22 0811 97.6 °F (36.4 °C) 88 16 105/72 92 %   10/28/22 0224 98.3 °F (36.8 °C) 87 16 120/88 98 %   10/27/22 2316 -- -- -- (!) 117/58 --   10/27/22 2239 98.2 °F (36.8 °C) 67 15 90/64 99 %   10/27/22 1956 98 °F (36.7 °C) 86 14 (!) 94/56 99 %         Intake/Output Summary (Last 24 hours) at 10/28/2022 1836  Last data filed at 10/28/2022 1413  Gross per 24 hour   Intake 600 ml   Output 1675 ml   Net -1075 ml          I had a face to face encounter and independently examined this patient  as outlined below:  PHYSICAL EXAM:  General: Mild distress, pain with motion of torso  Resp:  Mild crackles present, no wheezing, nonlabored  CV:  Regular  rhythm,  No edema  GI:  Soft, Non distended, Non tender. +Bowel sounds  Neurologic:  Alert and oriented X 1, drowsy after procedure,  pupils are reactive bilaterally  Psych:    Not anxious nor agitated  Skin:  Skin tears and wounds in various stages of healing on extremities  Extremities      Left AKA, R knee tender to touch    Reviewed most current lab test results and cultures  YES  Reviewed most current radiology test results   YES  Review and summation of old records today    NO  Reviewed patient's current orders and MAR    YES  PMH/ reviewed - no change compared to H&P  ________________________________________________________________________  Care Plan discussed with:    Comments   Patient x    Family      RN x    Care Manager     Consultant                       y Multidiciplinary team rounds were held today with , nursing, pharmacist and clinical coordinator. Patient's plan of care was discussed; medications were reviewed and discharge planning was addressed.      ________________________________________________________________________    Procedures: see electronic medical records for all procedures/Xrays and details which were not copied into this note but were reviewed prior to creation of Plan. LABS:  I reviewed today's most current labs and imaging studies.   Pertinent labs include:  Recent Labs     10/26/22  1037   WBC 12.1*   HGB 8.9*   HCT 29.0*          Recent Labs     10/26/22  1037   *   K 4.0   CL 96*   CO2 34*   *   BUN 8   CREA 0.67*   CA 9.3       Signed: Sylvia Correa MD

## 2022-10-29 LAB
ANION GAP SERPL CALC-SCNC: 5 MMOL/L (ref 5–15)
BASOPHILS # BLD: 0.1 K/UL (ref 0–0.1)
BASOPHILS NFR BLD: 1 % (ref 0–1)
BUN SERPL-MCNC: 7 MG/DL (ref 6–20)
BUN/CREAT SERPL: 9 (ref 12–20)
CALCIUM SERPL-MCNC: 8.8 MG/DL (ref 8.5–10.1)
CHLORIDE SERPL-SCNC: 97 MMOL/L (ref 97–108)
CO2 SERPL-SCNC: 35 MMOL/L (ref 21–32)
CREAT SERPL-MCNC: 0.77 MG/DL (ref 0.7–1.3)
DIFFERENTIAL METHOD BLD: ABNORMAL
EOSINOPHIL # BLD: 0.5 K/UL (ref 0–0.4)
EOSINOPHIL NFR BLD: 5 % (ref 0–7)
ERYTHROCYTE [DISTWIDTH] IN BLOOD BY AUTOMATED COUNT: 16.4 % (ref 11.5–14.5)
GLUCOSE SERPL-MCNC: 126 MG/DL (ref 65–100)
HCT VFR BLD AUTO: 27.1 % (ref 36.6–50.3)
HGB BLD-MCNC: 8.3 G/DL (ref 12.1–17)
IMM GRANULOCYTES # BLD AUTO: 0.1 K/UL (ref 0–0.04)
IMM GRANULOCYTES NFR BLD AUTO: 1 % (ref 0–0.5)
LYMPHOCYTES # BLD: 1.6 K/UL (ref 0.8–3.5)
LYMPHOCYTES NFR BLD: 17 % (ref 12–49)
MAGNESIUM SERPL-MCNC: 1.9 MG/DL (ref 1.6–2.4)
MCH RBC QN AUTO: 24.1 PG (ref 26–34)
MCHC RBC AUTO-ENTMCNC: 30.6 G/DL (ref 30–36.5)
MCV RBC AUTO: 78.8 FL (ref 80–99)
MONOCYTES # BLD: 1.4 K/UL (ref 0–1)
MONOCYTES NFR BLD: 15 % (ref 5–13)
NEUTS SEG # BLD: 5.8 K/UL (ref 1.8–8)
NEUTS SEG NFR BLD: 61 % (ref 32–75)
NRBC # BLD: 0 K/UL (ref 0–0.01)
NRBC BLD-RTO: 0 PER 100 WBC
PHOSPHATE SERPL-MCNC: 5 MG/DL (ref 2.6–4.7)
PLATELET # BLD AUTO: 345 K/UL (ref 150–400)
PMV BLD AUTO: 8.5 FL (ref 8.9–12.9)
POTASSIUM SERPL-SCNC: 3.9 MMOL/L (ref 3.5–5.1)
RBC # BLD AUTO: 3.44 M/UL (ref 4.1–5.7)
SODIUM SERPL-SCNC: 137 MMOL/L (ref 136–145)
WBC # BLD AUTO: 9.4 K/UL (ref 4.1–11.1)

## 2022-10-29 PROCEDURE — 74011250636 HC RX REV CODE- 250/636: Performed by: STUDENT IN AN ORGANIZED HEALTH CARE EDUCATION/TRAINING PROGRAM

## 2022-10-29 PROCEDURE — 74011250637 HC RX REV CODE- 250/637: Performed by: INTERNAL MEDICINE

## 2022-10-29 PROCEDURE — 80048 BASIC METABOLIC PNL TOTAL CA: CPT

## 2022-10-29 PROCEDURE — 74011000250 HC RX REV CODE- 250: Performed by: STUDENT IN AN ORGANIZED HEALTH CARE EDUCATION/TRAINING PROGRAM

## 2022-10-29 PROCEDURE — 74011250637 HC RX REV CODE- 250/637: Performed by: STUDENT IN AN ORGANIZED HEALTH CARE EDUCATION/TRAINING PROGRAM

## 2022-10-29 PROCEDURE — 83735 ASSAY OF MAGNESIUM: CPT

## 2022-10-29 PROCEDURE — 36415 COLL VENOUS BLD VENIPUNCTURE: CPT

## 2022-10-29 PROCEDURE — 74011000250 HC RX REV CODE- 250: Performed by: INTERNAL MEDICINE

## 2022-10-29 PROCEDURE — 84100 ASSAY OF PHOSPHORUS: CPT

## 2022-10-29 PROCEDURE — 85025 COMPLETE CBC W/AUTO DIFF WBC: CPT

## 2022-10-29 PROCEDURE — 65270000046 HC RM TELEMETRY

## 2022-10-29 PROCEDURE — 94640 AIRWAY INHALATION TREATMENT: CPT

## 2022-10-29 RX ORDER — SENNOSIDES 8.6 MG/1
2 TABLET ORAL 2 TIMES DAILY
Status: DISCONTINUED | OUTPATIENT
Start: 2022-10-29 | End: 2022-11-06

## 2022-10-29 RX ORDER — ACETAMINOPHEN 650 MG/1
650 SUPPOSITORY RECTAL EVERY 6 HOURS
Status: DISCONTINUED | OUTPATIENT
Start: 2022-10-29 | End: 2022-10-29

## 2022-10-29 RX ORDER — ACETAMINOPHEN 325 MG/1
650 TABLET ORAL EVERY 6 HOURS
Status: DISCONTINUED | OUTPATIENT
Start: 2022-10-29 | End: 2022-11-20 | Stop reason: HOSPADM

## 2022-10-29 RX ADMIN — CEFDINIR 300 MG: 300 CAPSULE ORAL at 09:44

## 2022-10-29 RX ADMIN — POLYETHYLENE GLYCOL 3350 17 G: 17 POWDER, FOR SOLUTION ORAL at 11:49

## 2022-10-29 RX ADMIN — GABAPENTIN 100 MG: 300 CAPSULE ORAL at 17:11

## 2022-10-29 RX ADMIN — PANTOPRAZOLE SODIUM 40 MG: 40 TABLET, DELAYED RELEASE ORAL at 09:45

## 2022-10-29 RX ADMIN — SODIUM CHLORIDE, PRESERVATIVE FREE 10 ML: 5 INJECTION INTRAVENOUS at 17:16

## 2022-10-29 RX ADMIN — COLCHICINE 0.6 MG: 0.6 TABLET, FILM COATED ORAL at 01:11

## 2022-10-29 RX ADMIN — GABAPENTIN 100 MG: 300 CAPSULE ORAL at 21:00

## 2022-10-29 RX ADMIN — SENNOSIDES 17.2 MG: 8.6 TABLET, FILM COATED ORAL at 17:10

## 2022-10-29 RX ADMIN — OXYCODONE 15 MG: 5 TABLET ORAL at 05:12

## 2022-10-29 RX ADMIN — COLCHICINE 0.6 MG: 0.6 TABLET, FILM COATED ORAL at 10:37

## 2022-10-29 RX ADMIN — ALLOPURINOL 100 MG: 100 TABLET ORAL at 09:45

## 2022-10-29 RX ADMIN — OXYCODONE 15 MG: 5 TABLET ORAL at 23:41

## 2022-10-29 RX ADMIN — OXYCODONE 15 MG: 5 TABLET ORAL at 17:11

## 2022-10-29 RX ADMIN — OXYCODONE 15 MG: 5 TABLET ORAL at 09:45

## 2022-10-29 RX ADMIN — OXYCODONE 15 MG: 5 TABLET ORAL at 13:24

## 2022-10-29 RX ADMIN — CASTOR OIL AND BALSAM, PERU: 788; 87 OINTMENT TOPICAL at 21:00

## 2022-10-29 RX ADMIN — SODIUM CHLORIDE, PRESERVATIVE FREE 10 ML: 5 INJECTION INTRAVENOUS at 05:14

## 2022-10-29 RX ADMIN — TAMSULOSIN HYDROCHLORIDE 0.4 MG: 0.4 CAPSULE ORAL at 09:45

## 2022-10-29 RX ADMIN — ACETYLCYSTEINE 600 MG: 200 SOLUTION ORAL; RESPIRATORY (INHALATION) at 14:27

## 2022-10-29 RX ADMIN — GABAPENTIN 100 MG: 300 CAPSULE ORAL at 09:45

## 2022-10-29 RX ADMIN — CASTOR OIL AND BALSAM, PERU: 788; 87 OINTMENT TOPICAL at 09:46

## 2022-10-29 RX ADMIN — DOXAZOSIN MESYLATE 2 MG: 2 TABLET ORAL at 09:44

## 2022-10-29 RX ADMIN — ACETAMINOPHEN 650 MG: 325 TABLET ORAL at 17:10

## 2022-10-29 RX ADMIN — ENOXAPARIN SODIUM 30 MG: 100 INJECTION SUBCUTANEOUS at 09:45

## 2022-10-29 RX ADMIN — COLCHICINE 1.2 MG: 0.6 TABLET, FILM COATED ORAL at 00:18

## 2022-10-29 RX ADMIN — METRONIDAZOLE 500 MG: 250 TABLET ORAL at 09:45

## 2022-10-29 RX ADMIN — CARVEDILOL 3.12 MG: 3.12 TABLET, FILM COATED ORAL at 09:45

## 2022-10-29 RX ADMIN — IPRATROPIUM BROMIDE AND ALBUTEROL SULFATE 3 ML: .5; 3 SOLUTION RESPIRATORY (INHALATION) at 14:27

## 2022-10-29 RX ADMIN — OXYCODONE 15 MG: 5 TABLET ORAL at 00:16

## 2022-10-29 RX ADMIN — SODIUM CHLORIDE, PRESERVATIVE FREE 10 ML: 5 INJECTION INTRAVENOUS at 23:42

## 2022-10-29 RX ADMIN — CEFDINIR 300 MG: 300 CAPSULE ORAL at 21:00

## 2022-10-29 RX ADMIN — METHADONE HYDROCHLORIDE 60 MG: 10 CONCENTRATE ORAL at 09:43

## 2022-10-29 RX ADMIN — ACETAMINOPHEN 650 MG: 325 TABLET ORAL at 23:40

## 2022-10-29 RX ADMIN — CARVEDILOL 3.12 MG: 3.12 TABLET, FILM COATED ORAL at 17:10

## 2022-10-29 RX ADMIN — OXYCODONE 15 MG: 5 TABLET ORAL at 20:51

## 2022-10-29 RX ADMIN — ACETAMINOPHEN 650 MG: 325 TABLET ORAL at 09:44

## 2022-10-29 RX ADMIN — IPRATROPIUM BROMIDE AND ALBUTEROL SULFATE 3 ML: .5; 3 SOLUTION RESPIRATORY (INHALATION) at 09:41

## 2022-10-29 RX ADMIN — LEVOTHYROXINE SODIUM 125 MCG: 0.12 TABLET ORAL at 09:45

## 2022-10-29 RX ADMIN — SODIUM CHLORIDE, PRESERVATIVE FREE 10 ML: 5 INJECTION INTRAVENOUS at 23:43

## 2022-10-29 RX ADMIN — ACETYLCYSTEINE 600 MG: 200 SOLUTION ORAL; RESPIRATORY (INHALATION) at 09:41

## 2022-10-29 RX ADMIN — METRONIDAZOLE 500 MG: 250 TABLET ORAL at 20:53

## 2022-10-29 NOTE — PROGRESS NOTES
Bedside and Verbal shift change report given to Brigette Mcguire RN (oncoming nurse) by Lilian Dalton LPN (offgoing nurse). Report included the following information SBAR and Kardex.

## 2022-10-29 NOTE — PROGRESS NOTES
ADULT PROTOCOL: JET AEROSOL ASSESSMENT    Patient  Vineet Servin     61 y.o.   male     10/29/2022  11:11 AM    Breath Sounds Pre Procedure: Right Breath Sounds: Coarse                               Left Breath Sounds: Coarse    Breath Sounds Post Procedure: Right Breath Sounds: Coarse                                 Left Breath Sounds: Coarse    Breathing pattern: Pre procedure Breathing Pattern: Regular          Post procedure Breathing Pattern: Regular    Heart Rate: Pre procedure Pulse: 86           Post procedure Pulse: 88    Resp Rate: Pre procedure Respirations: 22           Post procedure Respirations: 22    Cough: Pre procedure Cough: Congested               Post procedure Cough: Non-productive    Oxygen: O2 Device: Nasal cannula   O2 Flow Rate (L/min): 5 l/min     Changed: NO    SpO2: Pre procedure SpO2: 100 %   with oxygen              Post procedure SpO2: 100 %  with oxygen    Nebulizer Therapy: Current medications Aerosolized Medications: DuoNeb, Mucomyst      Changed: NO    Smoking History:   Social History     Tobacco Use   Smoking Status Every Day    Packs/day: 0.50    Types: Cigarettes   Smokeless Tobacco Never   Tobacco Comments    instructed not to smoke 24 hrs prior surgery       Problem List:   Patient Active Problem List   Diagnosis Code    Thromboembolism (HCC) I74.9    Cerebral hemorrhage with hemiparesis (HCC) I61.9, G81.90    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    Cerebral infarction (HCC) I63.9    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    Drug overdose T50.901A    HTN (hypertension) I10    Cellulitis of left lower extremity L03.116    Long term current use of methadone for pain control Z79.891    Major depressive disorder with current active episode F32.9    Physical debility R53.81    Malignant neoplasm of urinary bladder (HCC) C67.9    Brain aneurysm I67.1    Chronic pain G89.29    Neurologic gait dysfunction R26.9    Spasticity R25.2    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    FH: bladder cancer Z80.52    Left foot infection L08.9    Major depression K76.1    Uncomplicated opioid dependence ([facility]) F11.20    Chronic obstructive pulmonary disease ([facility]) J44.9    Chronic pain disorder G89.4    Hypokalemia E87.6    Seizure disorder ([facility]) G40.909    Tobacco abuse Z72.0    Foot ulcer ([facility]) L97.509    Nonadherence to medical treatment Z91.199    Sinus bradycardia R00.1    Gout M10.9    Hypothyroidism E03.9    Foot infection L08.9    Open wound, lower leg S81.809A    Pyoderma gangrenosum L88    Traumatic subarachnoid hemorrhage S06. 6XAA    Traumatic subarachnoid hemorrhage without loss of consciousness ([address] S06.6X0A    Infected wound T14. 8XXA, L08.9    Sepsis ([address] A41.9    Severe sepsis ([address] A41.9, R65.20    Left leg cellulitis L03.116    COPD with acute exacerbation ([address] J44.1       Respiratory Therapist: Tiffany Domingo RT

## 2022-10-29 NOTE — PROGRESS NOTES
Hospitalist Progress Note    NAME: Larissa Easton   :  1965   MRN:  [id number]     Larissa Easton is a 61 y.o.   male with past medical history as listed below who presents with complaints of severe back pain reportedly under shoulder blades reportedly 10/10 pain that is constant/aching as well as mild worsening of baseline shortness of breath secondary to underlying COPD. Assessment / Plan:  T12 inferior vertebral endplate compression fracture  Acute low back pain due to above  -CT abdomen shows possibility of T12 inferior vertebral endplate compression fracture. -MRI shows evidence of acute T12 fracture. - kyphoplasty performed 10/27/22  - discussed with IR, procedure should be relatively immediately curing of pain related to location of procedure  - no saddle anesthesia, no numbness, no focal weakness, no issues with urination  - still with persistent back pain. Ordered repeat MRI lumbar and thoracic. May need anesthesia support  -Pain control with fentanyl patch, methadone 60 mg daily  - increased oxycodone to 15 mg every 3 hours as needed. Patient shared that he was on 30mg q3h for 2 years previously and that he does not wish to be taking that high of doses in the future. Is aware of their addictive qualities. - PT -> SNF    Carbapenem resistant urinary tract infection  History of recent Gross catheter and also straight cath  -Urine culture grew Carbapenem resistant Klebsiella pneumonia  -Based on urine culture and sensitivity, currently on gentamicin  - consulted ID 10/26/22, continue Gentamicin through 10/28/22, 10 day total course    Suspected aspiration pneumonia  Acute hypoxic respiratory failure  -MRI shows evidence of aspiration pneumonia.   Chest x-ray also shows basilar infiltrates with possible effusion  -He is also reporting some worsening of shortness of breath along with cough and small amount of phlegm  -started empiric antibiotics with cefepime and Flagyl, started 10/25/22  - changed to Cefdinir flagyl, need duration for discharge  -Aspiration precautions.  -We will change to dysphagia diet. We will consult speech therapy, appreciate assistance  -Has leukocytosis of 14,000 but he was recently on steroids which may be contributing. Procal normal  -Continue oxygen nasal cannula, Wean as tolerated  - CXR 10/28/22 showed L hemithorax opacity possible LLL collapse, L pleural effusion  - incentive spirometery  - consulted Pulmonology for persistent O2 requirement, added bronchodilators and mucolytics  - needs repeat CXR on 10/30  - followup CT in 1 year for 3mm pulm nodule    Possible gout flare - improved  Describes knee pain, similar to previous gout flare  - started colchicine 1.2 x1, 0.6 x1, then 0.6 daily  - started allopurinol 100 daily. Was prescribed 300 daily but not taking consistently, so essentially starting over    Chronic pain on methadone  -Continue home methadone. Rest of pain meds as above    Debility secondary to CVA and recent left AKA  -Continue PT OT    Chronic wounds  -Continue wound care    Mild COPD exacerbation  Schedule DuoNebs every 6 hours with every 4 hours as needed breakthrough nebs  S/p prednisone    Leukocytosis likely due to steroids - improved  - He is afebrile  - monitor    GERD  Protonix daily     Essential hypertension  Continue PTA Coreg     Hypothyroidism  Continue PTA Synthroid     Incidental findings requiring outpatient follow up/ evaluation:  3 mm right lung nodule. -Guidelines recommend repeat scan in 12 months  Increase in size of intermediate density left renal lesion. Recommend  follow-up with ultrasound in 6 months. Left AKA    30.0 - 39.9 Obese / Body mass index is 30.79 kg/m². Estimated discharge date: 10/30  Barriers: pain control, improvement in resp status, MRIs    Code status: Full  Prophylaxis: Lovenox  Recommended Disposition: SNF     Subjective:   States his knee pain is better, but still with persistent back pain.  No other complaints. States he would work with PT.    Objective:     VITALS:   Last 24hrs VS reviewed since prior progress note. Most recent are:  Patient Vitals for the past 24 hrs:   Temp Pulse Resp BP SpO2   10/29/22 1539 98.2 °F (36.8 °C) 76 16 92/66 94 %   10/29/22 1432 -- -- -- -- 95 %   10/29/22 1200 98.6 °F (37 °C) 95 16 111/69 98 %   10/29/22 0941 -- -- -- -- 100 %   10/29/22 0858 98.7 °F (37.1 °C) 91 16 113/62 97 %   10/29/22 0505 98 °F (36.7 °C) -- -- -- --   10/29/22 0300 98.1 °F (36.7 °C) 76 14 118/64 100 %   10/28/22 2139 -- -- -- -- 94 %   10/28/22 2026 98.5 °F (36.9 °C) 85 18 137/85 95 %   10/28/22 1744 -- -- -- -- 94 %         Intake/Output Summary (Last 24 hours) at 10/29/2022 1610  Last data filed at 10/29/2022 1200  Gross per 24 hour   Intake 720 ml   Output 2100 ml   Net -1380 ml          I had a face to face encounter and independently examined this patient  as outlined below:  PHYSICAL EXAM:  General: Mild distress, pain with motion of torso  Resp:  Mild crackles present, no wheezing, nonlabored  CV:  Regular  rhythm,  No edema  GI:  Soft, Non distended, Non tender. +Bowel sounds  Neurologic:  Alert and oriented X 4, no focal deficits  Psych:    Not anxious nor agitated  Skin:  Skin tears and wounds in various stages of healing on extremities  Extremities      Left AKA, R knee tender to touch    Reviewed most current lab test results and cultures  YES  Reviewed most current radiology test results   YES  Review and summation of old records today    NO  Reviewed patient's current orders and MAR    YES  PMH/SH reviewed - no change compared to H&P  ________________________________________________________________________  Care Plan discussed with:    Comments   Patient x    Family      RN x    Care Manager     Consultant                        Multidiciplinary team rounds were held today with , nursing, pharmacist and clinical coordinator.   Patient's plan of care was discussed; medications were reviewed and discharge planning was addressed. ________________________________________________________________________    Procedures: see electronic medical records for all procedures/Xrays and details which were not copied into this note but were reviewed prior to creation of Plan. LABS:  I reviewed today's most current labs and imaging studies.   Pertinent labs include:  Recent Labs     10/29/22  0118   WBC 9.4   HGB 8.3*   HCT 27.1*          Recent Labs     10/29/22  0118      K 3.9   CL 97   CO2 35*   *   BUN 7   CREA 0.77   CA 8.8   MG 1.9   PHOS 5.0*       Signed: Nani English MD

## 2022-10-30 LAB
ANION GAP SERPL CALC-SCNC: 7 MMOL/L (ref 5–15)
ANION GAP SERPL CALC-SCNC: 7 MMOL/L (ref 5–15)
BASOPHILS # BLD: 0.1 K/UL (ref 0–0.1)
BASOPHILS NFR BLD: 1 % (ref 0–1)
BUN SERPL-MCNC: 6 MG/DL (ref 6–20)
BUN SERPL-MCNC: 6 MG/DL (ref 6–20)
BUN/CREAT SERPL: 8 (ref 12–20)
BUN/CREAT SERPL: 8 (ref 12–20)
CALCIUM SERPL-MCNC: 9 MG/DL (ref 8.5–10.1)
CALCIUM SERPL-MCNC: 9.1 MG/DL (ref 8.5–10.1)
CHLORIDE SERPL-SCNC: 98 MMOL/L (ref 97–108)
CHLORIDE SERPL-SCNC: 99 MMOL/L (ref 97–108)
CO2 SERPL-SCNC: 30 MMOL/L (ref 21–32)
CO2 SERPL-SCNC: 31 MMOL/L (ref 21–32)
CREAT SERPL-MCNC: 0.71 MG/DL (ref 0.7–1.3)
CREAT SERPL-MCNC: 0.75 MG/DL (ref 0.7–1.3)
DIFFERENTIAL METHOD BLD: ABNORMAL
EOSINOPHIL # BLD: 0.6 K/UL (ref 0–0.4)
EOSINOPHIL NFR BLD: 6 % (ref 0–7)
ERYTHROCYTE [DISTWIDTH] IN BLOOD BY AUTOMATED COUNT: 16.3 % (ref 11.5–14.5)
GLUCOSE SERPL-MCNC: 107 MG/DL (ref 65–100)
GLUCOSE SERPL-MCNC: 111 MG/DL (ref 65–100)
HCT VFR BLD AUTO: 28.9 % (ref 36.6–50.3)
HGB BLD-MCNC: 8.5 G/DL (ref 12.1–17)
IMM GRANULOCYTES # BLD AUTO: 0.1 K/UL (ref 0–0.04)
IMM GRANULOCYTES NFR BLD AUTO: 1 % (ref 0–0.5)
LYMPHOCYTES # BLD: 2.2 K/UL (ref 0.8–3.5)
LYMPHOCYTES NFR BLD: 23 % (ref 12–49)
MAGNESIUM SERPL-MCNC: 1.9 MG/DL (ref 1.6–2.4)
MCH RBC QN AUTO: 23.8 PG (ref 26–34)
MCHC RBC AUTO-ENTMCNC: 29.4 G/DL (ref 30–36.5)
MCV RBC AUTO: 81 FL (ref 80–99)
MONOCYTES # BLD: 1.2 K/UL (ref 0–1)
MONOCYTES NFR BLD: 13 % (ref 5–13)
NEUTS SEG # BLD: 5.2 K/UL (ref 1.8–8)
NEUTS SEG NFR BLD: 56 % (ref 32–75)
NRBC # BLD: 0 K/UL (ref 0–0.01)
NRBC BLD-RTO: 0 PER 100 WBC
PHOSPHATE SERPL-MCNC: 5.2 MG/DL (ref 2.6–4.7)
PLATELET # BLD AUTO: 451 K/UL (ref 150–400)
PMV BLD AUTO: 9.5 FL (ref 8.9–12.9)
POTASSIUM SERPL-SCNC: 3.7 MMOL/L (ref 3.5–5.1)
POTASSIUM SERPL-SCNC: 4.1 MMOL/L (ref 3.5–5.1)
RBC # BLD AUTO: 3.57 M/UL (ref 4.1–5.7)
SODIUM SERPL-SCNC: 136 MMOL/L (ref 136–145)
SODIUM SERPL-SCNC: 136 MMOL/L (ref 136–145)
WBC # BLD AUTO: 9.2 K/UL (ref 4.1–11.1)

## 2022-10-30 PROCEDURE — 94640 AIRWAY INHALATION TREATMENT: CPT

## 2022-10-30 PROCEDURE — 84100 ASSAY OF PHOSPHORUS: CPT

## 2022-10-30 PROCEDURE — 74011250636 HC RX REV CODE- 250/636: Performed by: STUDENT IN AN ORGANIZED HEALTH CARE EDUCATION/TRAINING PROGRAM

## 2022-10-30 PROCEDURE — 83735 ASSAY OF MAGNESIUM: CPT

## 2022-10-30 PROCEDURE — 94761 N-INVAS EAR/PLS OXIMETRY MLT: CPT

## 2022-10-30 PROCEDURE — 80048 BASIC METABOLIC PNL TOTAL CA: CPT

## 2022-10-30 PROCEDURE — 74011250637 HC RX REV CODE- 250/637: Performed by: STUDENT IN AN ORGANIZED HEALTH CARE EDUCATION/TRAINING PROGRAM

## 2022-10-30 PROCEDURE — 74011250637 HC RX REV CODE- 250/637: Performed by: INTERNAL MEDICINE

## 2022-10-30 PROCEDURE — 74011000250 HC RX REV CODE- 250: Performed by: STUDENT IN AN ORGANIZED HEALTH CARE EDUCATION/TRAINING PROGRAM

## 2022-10-30 PROCEDURE — 65270000046 HC RM TELEMETRY

## 2022-10-30 PROCEDURE — 36415 COLL VENOUS BLD VENIPUNCTURE: CPT

## 2022-10-30 PROCEDURE — 77010033678 HC OXYGEN DAILY

## 2022-10-30 PROCEDURE — 85025 COMPLETE CBC W/AUTO DIFF WBC: CPT

## 2022-10-30 PROCEDURE — 74011000250 HC RX REV CODE- 250: Performed by: INTERNAL MEDICINE

## 2022-10-30 RX ORDER — POLYETHYLENE GLYCOL 3350 17 G/17G
17 POWDER, FOR SOLUTION ORAL 2 TIMES DAILY
Status: DISCONTINUED | OUTPATIENT
Start: 2022-10-30 | End: 2022-11-06

## 2022-10-30 RX ORDER — ACETYLCYSTEINE 200 MG/ML
600 SOLUTION ORAL; RESPIRATORY (INHALATION)
Status: DISCONTINUED | OUTPATIENT
Start: 2022-10-30 | End: 2022-11-20 | Stop reason: HOSPADM

## 2022-10-30 RX ADMIN — OXYCODONE 15 MG: 5 TABLET ORAL at 11:45

## 2022-10-30 RX ADMIN — ALLOPURINOL 100 MG: 100 TABLET ORAL at 08:43

## 2022-10-30 RX ADMIN — OXYCODONE 15 MG: 5 TABLET ORAL at 14:52

## 2022-10-30 RX ADMIN — SODIUM CHLORIDE, PRESERVATIVE FREE 5 ML: 5 INJECTION INTRAVENOUS at 06:00

## 2022-10-30 RX ADMIN — SODIUM CHLORIDE, PRESERVATIVE FREE 10 ML: 5 INJECTION INTRAVENOUS at 14:55

## 2022-10-30 RX ADMIN — CARVEDILOL 3.12 MG: 3.12 TABLET, FILM COATED ORAL at 08:43

## 2022-10-30 RX ADMIN — OXYCODONE 15 MG: 5 TABLET ORAL at 08:44

## 2022-10-30 RX ADMIN — ACETAMINOPHEN 650 MG: 325 TABLET ORAL at 11:46

## 2022-10-30 RX ADMIN — IPRATROPIUM BROMIDE AND ALBUTEROL SULFATE 3 ML: .5; 3 SOLUTION RESPIRATORY (INHALATION) at 10:16

## 2022-10-30 RX ADMIN — GABAPENTIN 100 MG: 300 CAPSULE ORAL at 16:33

## 2022-10-30 RX ADMIN — ACETAMINOPHEN 650 MG: 325 TABLET ORAL at 05:53

## 2022-10-30 RX ADMIN — GABAPENTIN 100 MG: 300 CAPSULE ORAL at 08:43

## 2022-10-30 RX ADMIN — ACETAMINOPHEN 650 MG: 325 TABLET ORAL at 18:02

## 2022-10-30 RX ADMIN — CASTOR OIL AND BALSAM, PERU: 788; 87 OINTMENT TOPICAL at 08:51

## 2022-10-30 RX ADMIN — SENNOSIDES 17.2 MG: 8.6 TABLET, FILM COATED ORAL at 08:43

## 2022-10-30 RX ADMIN — OXYCODONE 15 MG: 5 TABLET ORAL at 05:53

## 2022-10-30 RX ADMIN — PANTOPRAZOLE SODIUM 40 MG: 40 TABLET, DELAYED RELEASE ORAL at 06:41

## 2022-10-30 RX ADMIN — IPRATROPIUM BROMIDE AND ALBUTEROL SULFATE 3 ML: .5; 3 SOLUTION RESPIRATORY (INHALATION) at 19:56

## 2022-10-30 RX ADMIN — METRONIDAZOLE 500 MG: 250 TABLET ORAL at 21:01

## 2022-10-30 RX ADMIN — OXYCODONE 15 MG: 5 TABLET ORAL at 21:01

## 2022-10-30 RX ADMIN — POLYETHYLENE GLYCOL 3350 17 G: 17 POWDER, FOR SOLUTION ORAL at 18:01

## 2022-10-30 RX ADMIN — MELATONIN 3 MG: at 03:09

## 2022-10-30 RX ADMIN — SODIUM CHLORIDE, PRESERVATIVE FREE 10 ML: 5 INJECTION INTRAVENOUS at 21:49

## 2022-10-30 RX ADMIN — CEFDINIR 300 MG: 300 CAPSULE ORAL at 21:01

## 2022-10-30 RX ADMIN — CASTOR OIL AND BALSAM, PERU: 788; 87 OINTMENT TOPICAL at 21:00

## 2022-10-30 RX ADMIN — SODIUM CHLORIDE, PRESERVATIVE FREE 10 ML: 5 INJECTION INTRAVENOUS at 05:56

## 2022-10-30 RX ADMIN — LEVOTHYROXINE SODIUM 125 MCG: 0.12 TABLET ORAL at 06:41

## 2022-10-30 RX ADMIN — SENNOSIDES 17.2 MG: 8.6 TABLET, FILM COATED ORAL at 18:02

## 2022-10-30 RX ADMIN — METHADONE HYDROCHLORIDE 60 MG: 10 CONCENTRATE ORAL at 08:42

## 2022-10-30 RX ADMIN — OXYCODONE 15 MG: 5 TABLET ORAL at 03:09

## 2022-10-30 RX ADMIN — TAMSULOSIN HYDROCHLORIDE 0.4 MG: 0.4 CAPSULE ORAL at 08:44

## 2022-10-30 RX ADMIN — METRONIDAZOLE 500 MG: 250 TABLET ORAL at 08:43

## 2022-10-30 RX ADMIN — GABAPENTIN 100 MG: 300 CAPSULE ORAL at 21:01

## 2022-10-30 RX ADMIN — SODIUM CHLORIDE, PRESERVATIVE FREE 10 ML: 5 INJECTION INTRAVENOUS at 05:55

## 2022-10-30 RX ADMIN — COLCHICINE 0.6 MG: 0.6 TABLET, FILM COATED ORAL at 10:56

## 2022-10-30 RX ADMIN — CEFDINIR 300 MG: 300 CAPSULE ORAL at 08:43

## 2022-10-30 RX ADMIN — IPRATROPIUM BROMIDE AND ALBUTEROL SULFATE 3 ML: .5; 3 SOLUTION RESPIRATORY (INHALATION) at 15:20

## 2022-10-30 RX ADMIN — ENOXAPARIN SODIUM 30 MG: 100 INJECTION SUBCUTANEOUS at 08:42

## 2022-10-30 RX ADMIN — OXYCODONE 15 MG: 5 TABLET ORAL at 18:02

## 2022-10-30 RX ADMIN — DOXAZOSIN MESYLATE 2 MG: 2 TABLET ORAL at 08:43

## 2022-10-30 NOTE — PROGRESS NOTES
Hospitalist Progress Note    NAME: Marisa Patient   :  1965   MRN:  [id number]     Marisa Patient is a 61 y.o.   male with past medical history as listed below who presents with complaints of severe back pain reportedly under shoulder blades reportedly 10/10 pain that is constant/aching as well as mild worsening of baseline shortness of breath secondary to underlying COPD. Assessment / Plan:  T12 inferior vertebral endplate compression fracture  Acute low back pain due to above  -CT abdomen shows possibility of T12 inferior vertebral endplate compression fracture. -MRI shows evidence of acute T12 fracture. - kyphoplasty performed 10/27/22  - discussed with IR, procedure should be relatively immediately curing of pain related to location of procedure  - no saddle anesthesia, no numbness, no focal weakness, no issues with urination  - still with persistent back pain. Ordered repeat MRI lumbar and thoracic. May need anesthesia support  -Pain control with fentanyl patch, methadone 60 mg daily  - increased oxycodone to 15 mg every 3 hours as needed. Patient shared that he was on 30mg q3h for 2 years previously and that he does not wish to be taking that high of doses in the future. Is aware of their addictive qualities. - PT -> SNF  - increased bowel reg    Carbapenem resistant urinary tract infection  History of recent Gross catheter and also straight cath  -Urine culture grew Carbapenem resistant Klebsiella pneumonia  -Based on urine culture and sensitivity, currently on gentamicin  - consulted ID 10/26/22, continue Gentamicin through 10/28/22, 10 day total course    Suspected aspiration pneumonia  Acute hypoxic respiratory failure  -MRI shows evidence of aspiration pneumonia.   Chest x-ray also shows basilar infiltrates with possible effusion  -He is also reporting some worsening of shortness of breath along with cough and small amount of phlegm  -started empiric antibiotics with cefepime and Flagyl, started 10/25/22  - changed to Cefdinir flagyl, need duration for discharge  -Aspiration precautions.  -We will change to dysphagia diet. We will consult speech therapy, appreciate assistance  -Has leukocytosis of 14,000 but he was recently on steroids which may be contributing. Procal normal  -Continue oxygen nasal cannula, Wean as tolerated  - CXR 10/28/22 showed L hemithorax opacity possible LLL collapse, L pleural effusion  - incentive spirometery  - consulted Pulmonology for persistent O2 requirement, added bronchodilators and mucolytics  - needs repeat CXR on 10/30  - followup CT in 1 year for 3mm pulm nodule    Possible gout flare - improved  Describes knee pain, similar to previous gout flare  - started colchicine 1.2 x1, 0.6 x1, then 0.6 daily  - started allopurinol 100 daily. Was prescribed 300 daily but not taking consistently, so essentially starting over    Chronic pain on methadone  -Continue home methadone. Rest of pain meds as above    Debility secondary to CVA and recent left AKA  -Continue PT OT    Chronic wounds  -Continue wound care    Mild COPD exacerbation  Schedule DuoNebs every 6 hours with every 4 hours as needed breakthrough nebs  S/p prednisone    Leukocytosis likely due to steroids - improved  - He is afebrile  - monitor    GERD  Protonix daily     Essential hypertension  Continue PTA Coreg     Hypothyroidism  Continue PTA Synthroid     Incidental findings requiring outpatient follow up/ evaluation:  3 mm right lung nodule. -Guidelines recommend repeat scan in 12 months  Increase in size of intermediate density left renal lesion. Recommend  follow-up with ultrasound in 6 months. Left AKA    30.0 - 39.9 Obese / Body mass index is 30.79 kg/m². Estimated discharge date: 10/31  Barriers: pain control, improvement in resp status, MRIs    Code status: Full  Prophylaxis: Lovenox  Recommended Disposition: SNF     Subjective:   Still with back pain. Breathing better.     Objective: VITALS:   Last 24hrs VS reviewed since prior progress note. Most recent are:  Patient Vitals for the past 24 hrs:   Temp Pulse Resp BP SpO2   10/30/22 1520 -- -- -- -- 95 %   10/30/22 1506 98.1 °F (36.7 °C) 64 18 (!) 86/65 94 %   10/30/22 1152 98.1 °F (36.7 °C) 75 18 99/69 94 %   10/30/22 1022 -- -- -- -- 92 %   10/30/22 0853 98.4 °F (36.9 °C) 80 18 95/63 90 %   10/30/22 0531 98 °F (36.7 °C) 83 18 133/72 94 %   10/30/22 0030 98 °F (36.7 °C) 70 18 109/69 93 %   10/29/22 2205 98.2 °F (36.8 °C) 73 18 135/70 97 %   10/29/22 2054 98.1 °F (36.7 °C) 68 18 119/72 91 %   10/29/22 2009 98.3 °F (36.8 °C) 73 16 107/70 97 %         Intake/Output Summary (Last 24 hours) at 10/30/2022 1747  Last data filed at 10/30/2022 1205  Gross per 24 hour   Intake --   Output 400 ml   Net -400 ml          I had a face to face encounter and independently examined this patient  as outlined below:  PHYSICAL EXAM:  General: Mild distress, pain with motion of torso  Resp:  Mild crackles present, no wheezing, nonlabored  CV:  Regular  rhythm,  No edema  GI:  Soft, Non distended, Non tender. +Bowel sounds  Neurologic:  Alert and oriented X 4, no focal deficits  Psych:    Not anxious nor agitated  Skin:  Skin tears and wounds in various stages of healing on extremities  Extremities      Left AKA, R knee tender to touch    Reviewed most current lab test results and cultures  YES  Reviewed most current radiology test results   YES  Review and summation of old records today    NO  Reviewed patient's current orders and MAR    YES  PMH/ reviewed - no change compared to H&P  ________________________________________________________________________  Care Plan discussed with:    Comments   Patient x    Family      RN x    Care Manager     Consultant                        Multidiciplinary team rounds were held today with , nursing, pharmacist and clinical coordinator.   Patient's plan of care was discussed; medications were reviewed and discharge planning was addressed. ________________________________________________________________________    Procedures: see electronic medical records for all procedures/Xrays and details which were not copied into this note but were reviewed prior to creation of Plan. LABS:  I reviewed today's most current labs and imaging studies.   Pertinent labs include:  Recent Labs     10/30/22  0211 10/29/22  0118   WBC 9.2 9.4   HGB 8.5* 8.3*   HCT 28.9* 27.1*   * 345       Recent Labs     10/30/22  0211 10/29/22  0118     136 137   K 4.1  3.7 3.9   CL 99  98 97   CO2 30  31 35*   *  107* 126*   BUN 6  6 7   CREA 0.71  0.75 0.77   CA 9.1  9.0 8.8   MG 1.9 1.9   PHOS 5.2* 5.0*       Signed: Bonny Perrin MD

## 2022-10-31 ENCOUNTER — APPOINTMENT (OUTPATIENT)
Dept: GENERAL RADIOLOGY | Age: 57
DRG: 321 | End: 2022-10-31
Attending: STUDENT IN AN ORGANIZED HEALTH CARE EDUCATION/TRAINING PROGRAM
Payer: MEDICAID

## 2022-10-31 PROCEDURE — 92526 ORAL FUNCTION THERAPY: CPT

## 2022-10-31 PROCEDURE — 74011250637 HC RX REV CODE- 250/637: Performed by: STUDENT IN AN ORGANIZED HEALTH CARE EDUCATION/TRAINING PROGRAM

## 2022-10-31 PROCEDURE — 74011250637 HC RX REV CODE- 250/637: Performed by: INTERNAL MEDICINE

## 2022-10-31 PROCEDURE — 77010033678 HC OXYGEN DAILY

## 2022-10-31 PROCEDURE — 94640 AIRWAY INHALATION TREATMENT: CPT

## 2022-10-31 PROCEDURE — 74011250636 HC RX REV CODE- 250/636: Performed by: STUDENT IN AN ORGANIZED HEALTH CARE EDUCATION/TRAINING PROGRAM

## 2022-10-31 PROCEDURE — 74011000250 HC RX REV CODE- 250: Performed by: INTERNAL MEDICINE

## 2022-10-31 PROCEDURE — 74011000250 HC RX REV CODE- 250: Performed by: STUDENT IN AN ORGANIZED HEALTH CARE EDUCATION/TRAINING PROGRAM

## 2022-10-31 PROCEDURE — 71045 X-RAY EXAM CHEST 1 VIEW: CPT

## 2022-10-31 PROCEDURE — 65270000046 HC RM TELEMETRY

## 2022-10-31 PROCEDURE — 94761 N-INVAS EAR/PLS OXIMETRY MLT: CPT

## 2022-10-31 RX ADMIN — OXYCODONE 15 MG: 5 TABLET ORAL at 21:23

## 2022-10-31 RX ADMIN — METRONIDAZOLE 500 MG: 250 TABLET ORAL at 21:23

## 2022-10-31 RX ADMIN — ACETAMINOPHEN 650 MG: 325 TABLET ORAL at 18:21

## 2022-10-31 RX ADMIN — LEVOTHYROXINE SODIUM 125 MCG: 0.12 TABLET ORAL at 07:54

## 2022-10-31 RX ADMIN — PANTOPRAZOLE SODIUM 40 MG: 40 TABLET, DELAYED RELEASE ORAL at 07:54

## 2022-10-31 RX ADMIN — ACETAMINOPHEN 650 MG: 325 TABLET ORAL at 00:00

## 2022-10-31 RX ADMIN — ALLOPURINOL 100 MG: 100 TABLET ORAL at 09:05

## 2022-10-31 RX ADMIN — OXYCODONE 15 MG: 5 TABLET ORAL at 12:14

## 2022-10-31 RX ADMIN — OXYCODONE 15 MG: 5 TABLET ORAL at 15:18

## 2022-10-31 RX ADMIN — LACTULOSE 45 ML: 20 SOLUTION ORAL at 14:29

## 2022-10-31 RX ADMIN — CEFDINIR 300 MG: 300 CAPSULE ORAL at 21:23

## 2022-10-31 RX ADMIN — GABAPENTIN 100 MG: 300 CAPSULE ORAL at 17:21

## 2022-10-31 RX ADMIN — IPRATROPIUM BROMIDE AND ALBUTEROL SULFATE 3 ML: .5; 3 SOLUTION RESPIRATORY (INHALATION) at 15:36

## 2022-10-31 RX ADMIN — OXYCODONE 15 MG: 5 TABLET ORAL at 18:21

## 2022-10-31 RX ADMIN — LACTULOSE 45 ML: 20 SOLUTION ORAL at 21:23

## 2022-10-31 RX ADMIN — SODIUM CHLORIDE, PRESERVATIVE FREE 5 ML: 5 INJECTION INTRAVENOUS at 14:29

## 2022-10-31 RX ADMIN — ENOXAPARIN SODIUM 30 MG: 100 INJECTION SUBCUTANEOUS at 09:05

## 2022-10-31 RX ADMIN — SENNOSIDES 17.2 MG: 8.6 TABLET, FILM COATED ORAL at 18:21

## 2022-10-31 RX ADMIN — ACETAMINOPHEN 650 MG: 325 TABLET ORAL at 05:37

## 2022-10-31 RX ADMIN — METRONIDAZOLE 500 MG: 250 TABLET ORAL at 09:04

## 2022-10-31 RX ADMIN — SODIUM CHLORIDE, PRESERVATIVE FREE 10 ML: 5 INJECTION INTRAVENOUS at 22:00

## 2022-10-31 RX ADMIN — SENNOSIDES 17.2 MG: 8.6 TABLET, FILM COATED ORAL at 09:04

## 2022-10-31 RX ADMIN — POLYETHYLENE GLYCOL 3350 17 G: 17 POWDER, FOR SOLUTION ORAL at 18:21

## 2022-10-31 RX ADMIN — CASTOR OIL AND BALSAM, PERU: 788; 87 OINTMENT TOPICAL at 12:14

## 2022-10-31 RX ADMIN — GABAPENTIN 100 MG: 300 CAPSULE ORAL at 09:04

## 2022-10-31 RX ADMIN — SODIUM CHLORIDE, PRESERVATIVE FREE 10 ML: 5 INJECTION INTRAVENOUS at 05:36

## 2022-10-31 RX ADMIN — CEFDINIR 300 MG: 300 CAPSULE ORAL at 09:04

## 2022-10-31 RX ADMIN — OXYCODONE 15 MG: 5 TABLET ORAL at 00:05

## 2022-10-31 RX ADMIN — CARVEDILOL 3.12 MG: 3.12 TABLET, FILM COATED ORAL at 17:21

## 2022-10-31 RX ADMIN — OXYCODONE 15 MG: 5 TABLET ORAL at 09:04

## 2022-10-31 RX ADMIN — COLCHICINE 0.6 MG: 0.6 TABLET, FILM COATED ORAL at 12:18

## 2022-10-31 RX ADMIN — OXYCODONE 15 MG: 5 TABLET ORAL at 03:10

## 2022-10-31 RX ADMIN — CARVEDILOL 3.12 MG: 3.12 TABLET, FILM COATED ORAL at 07:54

## 2022-10-31 RX ADMIN — GABAPENTIN 100 MG: 300 CAPSULE ORAL at 21:23

## 2022-10-31 RX ADMIN — OXYCODONE 15 MG: 5 TABLET ORAL at 06:04

## 2022-10-31 RX ADMIN — ACETAMINOPHEN 650 MG: 325 TABLET ORAL at 12:14

## 2022-10-31 RX ADMIN — METHADONE HYDROCHLORIDE 60 MG: 10 CONCENTRATE ORAL at 09:05

## 2022-10-31 RX ADMIN — CASTOR OIL AND BALSAM, PERU: 788; 87 OINTMENT TOPICAL at 21:00

## 2022-10-31 RX ADMIN — TAMSULOSIN HYDROCHLORIDE 0.4 MG: 0.4 CAPSULE ORAL at 09:05

## 2022-10-31 RX ADMIN — ONDANSETRON 4 MG: 2 INJECTION INTRAMUSCULAR; INTRAVENOUS at 09:23

## 2022-10-31 RX ADMIN — POLYETHYLENE GLYCOL 3350 17 G: 17 POWDER, FOR SOLUTION ORAL at 09:05

## 2022-10-31 RX ADMIN — SODIUM CHLORIDE, PRESERVATIVE FREE 10 ML: 5 INJECTION INTRAVENOUS at 05:37

## 2022-10-31 RX ADMIN — DOXAZOSIN MESYLATE 2 MG: 2 TABLET ORAL at 09:05

## 2022-10-31 NOTE — WOUND CARE
Wound Care consult:  Chart reviewed and patient assessed. I have seen the right heel wound upon admission and there is a softened scab on the heel. This heel has to stay off loaded / floated. Venelex has been applied to the wound. The heel is now floated with a Heelzup device. There is a small bleeding area on the left forearm. Venelex ointment was applied to the wound and nursing will place a foam dressing. Discussed with the RN.    Nara Palma RN, BSN, [facility]

## 2022-10-31 NOTE — PROGRESS NOTES
Problem: Dysphagia (Adult)  Goal: *Acute Goals and Plan of Care (Insert Text)  Description: Speech Pathology Goals  Initiated 10/26/2022    1. Patient will tolerate Easy to Chew/Thin Liquid diet without signs of aspiration or adverse effects within 7 days. Outcome: Progressing Towards Goal     SPEECH LANGUAGE PATHOLOGY DYSPHAGIA TREATMENT  Patient: Boyce Hamman (1 y.o. male)  Date: 10/31/2022  Diagnosis: COPD with acute exacerbation ([facility] [J44.1] <principal problem not specified>      Precautions: Fall, Contact (ESBL wound)    ASSESSMENT:  Patient continues to be followed by SLP and most recent SLP visit on 10/28 revealed difficulty maintaining upright position and low appetite; Easy to Chew/Thin Liquids was recommended. Patient continues with decreased appetite and reporting some nausea on this date (however when provided with emesis bag, patient asked SLP to throw it away). Patient agreeable to limited PO trials. Patient remains limited by positioning on this date. Patient tolerated thin liquid and solid trials without signs of aspiration, although participation limited overall. At this juncture, continue to recommend diet as outlined below. Suspect patient will not tolerate instrumental evaluation of swallowing given poor participation and positioning limitations. Spoke with MD re: SLP POC, who verbalized understanding. PLAN:  Recommendations and Planned Interventions:  -- Easy to Chew/Thin Liquids  -- Patient cleared for mixed consistencies from SLP perspective (i.e. chunky soup, cereal, etc)  -- As upright as possible with all PO; Use reverse trendelenburg  -- Small bites/sips  -- Strict oral care    Patient continues to benefit from skilled intervention to address the above impairments. Continue treatment per established plan of care. Discharge Recommendations: To Be Determined     SUBJECTIVE:   Patient stated Wendi Dysony you throw all that away?     OBJECTIVE:   Cognitive and Communication Status:  Neurologic State: Alert  Orientation Level: Oriented X4  Cognition: Follows commands  Perception: Appears intact  Perseveration: No perseveration noted  Safety/Judgement: Awareness of environment    Dysphagia Treatment:    P.O. Trials:  Patient Position: Upright in bed  Vocal quality prior to P.O.: No impairment  Consistency Presented: Thin liquid; Solid  How Presented: Self-fed/presented;Straw;Successive swallows     Bolus Acceptance: No impairment  Bolus Formation/Control: No impairment     Propulsion: No impairment  Oral Residue: None        Aspiration Signs/Symptoms: None                      Pain:  Pain Scale 1: Numeric (0 - 10)  Pain Intensity 1: 8       After treatment:   Patient left in no apparent distress in bed, Call bell within reach, and Nursing notified    COMMUNICATION/EDUCATION:   The patient's plan of care including recommendations, planned interventions, and recommended diet changes were discussed with: Registered nurse and Physician.      Antonia Townsend SLP  Time Calculation: 20 mins

## 2022-10-31 NOTE — PROGRESS NOTES
Hospitalist Progress Note    NAME: Earl Desai   :  1965   MRN:  [id number]     Earl Desai is a 61 y.o. male with past medical history as listed below who presents with complaints of severe back pain reportedly under shoulder blades reportedly 10/10 pain that is constant/aching as well as mild worsening of baseline shortness of breath secondary to underlying COPD. Assessment / Plan:  T12 inferior vertebral endplate compression fracture  Acute low back pain due to above  -CT abdomen shows possibility of T12 inferior vertebral endplate compression fracture. -MRI shows evidence of acute T12 fracture. - kyphoplasty performed 10/27/22  - discussed with IR, procedure should be relatively immediately curing of pain related to location of procedure  - no saddle anesthesia, no numbness, no focal weakness, no issues with urination  - still with persistent back pain. Ordered repeat MRI lumbar and thoracic, requires anesthesia support. Planned for 22  -Pain control with fentanyl patch, methadone 60 mg daily  - increased oxycodone to 15 mg every 3 hours as needed. Patient shared that he was on 30mg q3h for 2 years previously and that he does not wish to be taking that high of doses in the future. Is aware of their addictive qualities. - PT -> SNF  - increased bowel reg, added lactulose 10/31/22    Carbapenem resistant urinary tract infection  History of recent Gross catheter and also straight cath  -Urine culture grew Carbapenem resistant Klebsiella pneumonia  -Based on urine culture and sensitivity, currently on gentamicin  - consulted ID 10/26/22, continue Gentamicin through 10/28/22, 10 day total course    Suspected aspiration pneumonia  Acute hypoxic respiratory failure  -MRI shows evidence of aspiration pneumonia.   Chest x-ray also shows basilar infiltrates with possible effusion  -He is also reporting some worsening of shortness of breath along with cough and small amount of phlegm  -started empiric antibiotics with cefepime and Flagyl, started 10/25/22  - changed to Cefdinir flagyl, EOT 10/31/22, 7 day course  -Aspiration precautions.  -We will change to dysphagia diet. We will consult speech therapy, appreciate assistance  -Has leukocytosis of 14,000 but he was recently on steroids which may be contributing. Procal normal  -Continue oxygen nasal cannula, Wean as tolerated  - CXR 10/28/22 showed L hemithorax opacity possible LLL collapse, L pleural effusion  - incentive spirometery  - consulted Pulmonology for persistent O2 requirement, added bronchodilators and mucolytics  - repeat CXR ordered  - followup CT in 1 year for 3mm pulm nodule    Possible gout flare - improved  Describes knee pain, similar to previous gout flare  - started colchicine 1.2 x1, 0.6 x1, then 0.6 daily  - started allopurinol 100 daily. Was prescribed 300 daily but not taking consistently, so essentially starting over    Chronic pain on methadone  -Continue home methadone. Rest of pain meds as above    Debility secondary to CVA and recent left AKA  -Continue PT OT    Chronic wounds  -Continue wound care    Mild COPD exacerbation  Schedule DuoNebs every 6 hours with every 4 hours as needed breakthrough nebs  S/p prednisone    Leukocytosis likely due to steroids - improved  - He is afebrile  - monitor    GERD  Protonix daily     Essential hypertension  Continue PTA Coreg     Hypothyroidism  Continue PTA Synthroid     Incidental findings requiring outpatient follow up/ evaluation:  3 mm right lung nodule. -Guidelines recommend repeat scan in 12 months  Increase in size of intermediate density left renal lesion. Recommend  follow-up with ultrasound in 6 months. Left AKA    30.0 - 39.9 Obese / Body mass index is 30.79 kg/m².     Estimated discharge date: 11/3/22  Barriers: pain control, improvement in resp status, MRIs    Code status: Full  Prophylaxis: Lovenox  Recommended Disposition: SNF     Subjective: States his back pain is the same as yesterday. No bowel movement in a few days. Denies saddle anesthesia, is urinating. Objective:     VITALS:   Last 24hrs VS reviewed since prior progress note. Most recent are:  Patient Vitals for the past 24 hrs:   Temp Pulse Resp BP SpO2   10/31/22 0754 97.9 °F (36.6 °C) 92 18 114/79 94 %   10/30/22 2053 98 °F (36.7 °C) 72 20 99/67 92 %   10/30/22 1956 -- -- -- -- 93 %   10/30/22 1700 -- 68 -- 91/68 --   10/30/22 1520 -- -- -- -- 95 %   10/30/22 1506 98.1 °F (36.7 °C) 64 18 (!) 86/65 94 %         Intake/Output Summary (Last 24 hours) at 10/31/2022 1329  Last data filed at 10/31/2022 0612  Gross per 24 hour   Intake --   Output 800 ml   Net -800 ml          I had a face to face encounter and independently examined this patient  as outlined below:  PHYSICAL EXAM:  General: Mild distress, pain with motion of torso  Resp:  Mild crackles present, no wheezing, nonlabored  CV:  Regular  rhythm,  No edema  GI:  Soft, Non distended, Non tender. +Bowel sounds  Neurologic:  Alert and oriented X 4, no focal deficits  Psych:    Not anxious nor agitated  Skin:  Skin tears and wounds in various stages of healing on extremities  Extremities      Left AKA, R knee tender to touch    Reviewed most current lab test results and cultures  YES  Reviewed most current radiology test results   YES  Review and summation of old records today    NO  Reviewed patient's current orders and MAR    YES  PMH/ reviewed - no change compared to H&P  ________________________________________________________________________  Care Plan discussed with:    Comments   Patient x    Family      RN x    Care Manager     Consultant                       y Multidiciplinary team rounds were held today with , nursing, pharmacist and clinical coordinator. Patient's plan of care was discussed; medications were reviewed and discharge planning was addressed. ________________________________________________________________________    Procedures: see electronic medical records for all procedures/Xrays and details which were not copied into this note but were reviewed prior to creation of Plan. LABS:  I reviewed today's most current labs and imaging studies.   Pertinent labs include:  Recent Labs     10/30/22  0211 10/29/22  0118   WBC 9.2 9.4   HGB 8.5* 8.3*   HCT 28.9* 27.1*   * 345       Recent Labs     10/30/22  0211 10/29/22  0118     136 137   K 4.1  3.7 3.9   CL 99  98 97   CO2 30  31 35*   *  107* 126*   BUN 6  6 7   CREA 0.71  0.75 0.77   CA 9.1  9.0 8.8   MG 1.9 1.9   PHOS 5.2* 5.0*       Signed: Lizz Garcia MD

## 2022-10-31 NOTE — PROGRESS NOTES
Bedside shift change report given to Elyse Bang (oncoming nurse) by Dawit Leary RN (offgoing nurse). Report included the following information Kardex, Intake/Output, MAR, Accordion, and Recent Results.

## 2022-10-31 NOTE — CONSULTS
Pulmonary, Critical Care, and Sleep Medicine      Name: Briana Teresa MRN: [id number]   : 1965 Hospital: [facility]   Date: 10/31/2022        IMPRESSION:   Acute hypoxic respiratory failure  Atelectasis and mucous plugging of the left lung, possibly due to chronic aspiration  COPD, on no treatment at home, no PFTs available, but he does have emphysematous changes on CT  Chronic oropharyngeal dysphagia due to stroke  3 mm right lung nodule  History of ruptured cerebral aneurysm with residual left-sided hemiparesis  Status post T5 and T6 kyphoplasty 10/27/2022  T12 vertebral endplate compression fracture  CRE UTI  Chronic pain on methadone  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 Continue bronchodilators and mucolytics  Pulmonary toilet including incentive spirometry and Acapella valve, though the patient will need some improvements in pain control to help him improve his cough  Antibiotics  We will try to mobilize as much as possible. Tobacco cessation counseling  Will need a follow up CT in 1 year due to his 3 mm pulmonary nodule (and as routine screening for tobacco users)  Other issues per primary team     10/31/2022: Events of weekend reviewed. Notes and labs noted. Discussed management with Dr. Heavenly Fang. Chest x-ray from today reviewed. Increased aeration left lung but still with volume loss and increase opacification. Right lung fairly well expanded. On examination decreased movement on left chest.  No audible wheeze or rhonchi. Weak cough poor attempt. Complains of chest and back pain with deep inspiratory effort. Subjective: This patient has been seen and evaluated at the request of Dr. Sharif Chou for persistent O2 requirement.  Patient is a 61 y.o. 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 He had severe back pain at that time and was found to have a T12 inferior vertebral endplate compression fracture. He has had a prolonged hospital stay and had a kyphoplasty of T5 and T6 performed yesterday. During his hospitalization he has had an oxygen requirement which prompted consult to me today. He was treated for COPD exacerbation on admission with 5 days of prednisone, but he has only had 1 albuterol treatment in the last 10 days. He cannot cough because he is limited by pain. He reports that he has chronic issues with aspiration because of his stroke. Past Medical History:   Diagnosis Date    Aneurysm ([address]     (with stroke) brain    Bladder tumor     Cancer ([address]     bladder CA    Chronic pain     Family history of bladder cancer     GERD (gastroesophageal reflux disease)     Gout     History of vascular access device 04/25/2019    [facility] VAT 4 FR MIDLINE R Cephalic Limited access    History of vascular access device 04/26/2019    4 fr Midline right Cephalic midline access    Hypercholesterolemia     Hypertension     Lesion of bladder     Seizures ([address]     Stroke ([address] 2006    left sided weakness    Thromboembolus ([address]     left leg    Thyroid disease     TIA (transient ischemic attack) 2012      Past Surgical History:   Procedure Laterality Date    HX ORTHOPAEDIC      R arthroscopy    HX OTHER SURGICAL      x2 L foot    HX UROLOGICAL  04/20/2018    Cystoscopy, TURBT (greater than 5 cm resected) Dr. Loreta Cole, [facility].     IR KYPHOPLASTY THORACIC  10/27/2022    KNEE ARTHROSCP HARV      left knee    TRANSURETHRAL RESEC BLADDER NECK  08/10/2018      Prior to Admission medications    Medication Sig Start Date End Date Taking? Authorizing Provider   carvediloL (COREG) 6.25 mg tablet Take 6.25 mg by mouth two (2) times daily (with meals). Yes Provider, Historical   doxazosin (CARDURA) 2 mg tablet Take 2 mg by mouth daily. Yes Provider, Historical   pantoprazole (PROTONIX) 40 mg tablet Take 40 mg by mouth daily. Yes Provider, Historical   metFORMIN ER (GLUCOPHAGE XR) 500 mg tablet Take 500 mg by mouth daily. 9/27/22  Yes Provider, Historical   predniSONE (DELTASONE) 20 mg tablet Take 20 mg by mouth daily. 9/27/22  Yes Provider, Historical   tamsulosin (FLOMAX) 0.4 mg capsule Take 1 capsule by mouth daily after dinner 3/11/22  Yes Natividad Schultz MD   levothyroxine (SYNTHROID) 125 mcg tablet Take 125 mcg by mouth Daily (before breakfast). Yes Provider, Historical   ibuprofen (MOTRIN) 200 mg tablet Take 400 mg by mouth every six (6) hours as needed for Pain. Yes Provider, Historical   gabapentin (NEURONTIN) 100 mg capsule Take 200 mg by mouth three (3) times daily. Yes Provider, Historical   methadone (DOLOPHINE) 10 mg/mL solution Take 60 mg by mouth daily. Indications: excessive pain   Yes Provider, Historical   allopurinoL (ZYLOPRIM) 300 mg tablet Take 1 Tablet by mouth. Provider, Historical   colchicine 0.6 mg tablet Take 0.6 mg by mouth daily as needed for Gout or Pain.  Indications: acute inflammation of the joints due to gout attack    Provider, Historical     Allergies   Allergen Reactions    Sulfamethoxazole-Trimethoprim Rash     L eye and L hand    Ciprofloxacin Hives     Per pcp records    Codeine Hives     Tolerates dilaudid, oxycodone    Cymbalta [Duloxetine] Other (comments)     Confusion and memory loss    Lyrica [Pregabalin] Hives    Sulfa (Sulfonamide Antibiotics) Rash    Ultram [Tramadol] Hives    Vancomycin Shortness of Breath    Zosyn [Piperacillin-Tazobactam] Rash      Social History     Tobacco Use    Smoking status: Every Day     Packs/day: 0.50     Types: Cigarettes Smokeless tobacco: Never    Tobacco comments:     instructed not to smoke 24 hrs prior surgery   Substance Use Topics    Alcohol use:  Yes     Alcohol/week: 6.0 standard drinks     Types: 6 Cans of beer per week     Comment: occasional      Family History   Problem Relation Age of Onset    Diabetes Father     Lung Disease Father     Diabetes Sister     Diabetes Brother     Cancer Paternal Grandfather         Bladder        Current Facility-Administered Medications   Medication Dose Route Frequency    lactulose (CHRONULAC) 10 gram/15 mL solution 45 mL  30 g Oral TID    polyethylene glycol (MIRALAX) packet 17 g  17 g Oral BID    acetaminophen (TYLENOL) tablet 650 mg  650 mg Oral Q6H    senna (SENOKOT) tablet 17.2 mg  2 Tablet Oral BID    albuterol-ipratropium (DUO-NEB) 2.5 MG-0.5 MG/3 ML  3 mL Nebulization Q6H RT    allopurinoL (ZYLOPRIM) tablet 100 mg  100 mg Oral DAILY    colchicine tablet 0.6 mg  0.6 mg Oral DAILY    cefdinir (OMNICEF) capsule 300 mg  300 mg Oral Q12H    metroNIDAZOLE (FLAGYL) tablet 500 mg  500 mg Oral Q12H    sodium chloride (NS) flush 5-40 mL  5-40 mL IntraVENous Q8H    balsam peru-castor oiL (VENELEX) ointment   Topical BID    carvediloL (COREG) tablet 3.125 mg  3.125 mg Oral BID WITH MEALS    doxazosin (CARDURA) tablet 2 mg  2 mg Oral DAILY    pantoprazole (PROTONIX) tablet 40 mg  40 mg Oral ACB    enoxaparin (LOVENOX) injection 30 mg  30 mg SubCUTAneous Q12H    fentaNYL (DURAGESIC) 12 mcg/hr patch 1 Patch  1 Patch TransDERmal Q72H    gabapentin (NEURONTIN) capsule 100 mg  100 mg Oral TID    levothyroxine (SYNTHROID) tablet 125 mcg  125 mcg Oral ACB    methadone (DOLOPHINE) 10 mg/mL concentrated solution 60 mg  60 mg Oral DAILY    tamsulosin (FLOMAX) capsule 0.4 mg  0.4 mg Oral DAILY    sodium chloride (NS) flush 5-40 mL  5-40 mL IntraVENous Q8H    lidocaine 4 % patch 2 Patch  2 Patch TransDERmal Q24H       Review of Systems:  A comprehensive review of systems was negative except for that written in the HPI. Objective:   Vital Signs:    Visit Vitals  /70   Pulse 73   Temp 98.3 °F (36.8 °C)   Resp 18   Ht 6' (1.829 m)   Wt 103 kg (227 lb)   SpO2 90%   BMI 30.79 kg/m²       O2 Device: Nasal cannula   O2 Flow Rate (L/min): 2 l/min   Temp (24hrs), Av.1 °F (36.7 °C), Min:97.9 °F (36.6 °C), Max:98.3 °F (36.8 °C)       Intake/Output:   Last shift:      10/31 0701 - 10/31 1900  In: -   Out: 350 [Urine:350]  Last 3 shifts: 10/29 1901 - 10/31 0700  In: -   Out: 1200 [Urine:1200]    Intake/Output Summary (Last 24 hours) at 10/31/2022 181  Last data filed at 10/31/2022 1658  Gross per 24 hour   Intake --   Output 1150 ml   Net -1150 ml        Physical Exam:   General:  Alert, cooperative, no distress    Head:  Normocephalic, without obvious abnormality, atraumatic. Eyes:  Conjunctivae/corneas clear. Nose: Nares normal. Septum midline. Mucosa normal.    Throat: Lips, mucosa, and tongue normal.     Neck: Supple, symmetrical, trachea midline    Back:   Unable to examine due to pain with movement   Lungs:   Near-absent breath sounds on left    Chest wall:  No tenderness or deformity. Heart:  Regular rate and rhythm    Abdomen:   Soft, non-tender. Bowel sounds normal.     Extremities: Extremities normal, atraumatic, no cyanosis, left AKA   Skin: Skin color, texture, turgor normal. No rashes or lesions   Lymph nodes: Cervical, supraclavicular nodes normal.   Neurologic: Grossly nonfocal     Data review:   No results found for this or any previous visit (from the past 24 hour(s)).     Imaging:  I have personally reviewed the patients radiographs and have reviewed the reports:  Near complete opacification of the left lung with tracheal deviation to the left, consistent with atelectasis mucous plugging        Ese Ramirez MD

## 2022-10-31 NOTE — PROGRESS NOTES
Problem: Patient Education: Go to Patient Education Activity  Goal: Patient/Family Education  Outcome: Progressing Towards Goal     Problem: Pressure Injury - Risk of  Goal: *Prevention of pressure injury  Description: Document Dejuan Scale and appropriate interventions in the flowsheet. Outcome: Progressing Towards Goal  Note: Pressure Injury Interventions:  Sensory Interventions: Assess changes in LOC    Moisture Interventions: Minimize layers, Absorbent underpads    Activity Interventions: Assess need for specialty bed, Increase time out of bed    Mobility Interventions: Assess need for specialty bed, Chair cushion, Float heels, HOB 30 degrees or less, PT/OT evaluation    Nutrition Interventions: Document food/fluid/supplement intake    Friction and Shear Interventions: Apply protective barrier, creams and emollients                Problem: Patient Education: Go to Patient Education Activity  Goal: Patient/Family Education  Outcome: Progressing Towards Goal     Problem: Falls - Risk of  Goal: *Absence of Falls  Description: Document Jordon Fall Risk and appropriate interventions in the flowsheet.   Outcome: Progressing Towards Goal  Note: Fall Risk Interventions:  Mobility Interventions: Bed/chair exit alarm    Mentation Interventions: Bed/chair exit alarm    Medication Interventions: Bed/chair exit alarm    Elimination Interventions: Bed/chair exit alarm, Call light in reach, Urinal in reach    History of Falls Interventions: Bed/chair exit alarm, Room close to nurse's station         Problem: Patient Education: Go to Patient Education Activity  Goal: Patient/Family Education  Outcome: Progressing Towards Goal     Problem: Pain  Goal: *Control of Pain  Outcome: Progressing Towards Goal     Problem: Patient Education: Go to Patient Education Activity  Goal: Patient/Family Education  Outcome: Progressing Towards Goal     Problem: Patient Education: Go to Patient Education Activity  Goal: Patient/Family Education  Outcome: Progressing Towards Goal

## 2022-11-01 PROCEDURE — 74011000250 HC RX REV CODE- 250: Performed by: STUDENT IN AN ORGANIZED HEALTH CARE EDUCATION/TRAINING PROGRAM

## 2022-11-01 PROCEDURE — 74011250637 HC RX REV CODE- 250/637: Performed by: INTERNAL MEDICINE

## 2022-11-01 PROCEDURE — 94761 N-INVAS EAR/PLS OXIMETRY MLT: CPT

## 2022-11-01 PROCEDURE — 65270000046 HC RM TELEMETRY

## 2022-11-01 PROCEDURE — 97530 THERAPEUTIC ACTIVITIES: CPT

## 2022-11-01 PROCEDURE — 92526 ORAL FUNCTION THERAPY: CPT | Performed by: SPEECH-LANGUAGE PATHOLOGIST

## 2022-11-01 PROCEDURE — 77010033678 HC OXYGEN DAILY

## 2022-11-01 PROCEDURE — 74011250637 HC RX REV CODE- 250/637: Performed by: STUDENT IN AN ORGANIZED HEALTH CARE EDUCATION/TRAINING PROGRAM

## 2022-11-01 PROCEDURE — 74011250636 HC RX REV CODE- 250/636: Performed by: STUDENT IN AN ORGANIZED HEALTH CARE EDUCATION/TRAINING PROGRAM

## 2022-11-01 PROCEDURE — 97535 SELF CARE MNGMENT TRAINING: CPT

## 2022-11-01 PROCEDURE — 94760 N-INVAS EAR/PLS OXIMETRY 1: CPT

## 2022-11-01 RX ORDER — IPRATROPIUM BROMIDE AND ALBUTEROL SULFATE 2.5; .5 MG/3ML; MG/3ML
3 SOLUTION RESPIRATORY (INHALATION)
Status: DISCONTINUED | OUTPATIENT
Start: 2022-11-01 | End: 2022-11-01

## 2022-11-01 RX ORDER — FACIAL-BODY WIPES
10 EACH TOPICAL DAILY
Status: DISCONTINUED | OUTPATIENT
Start: 2022-11-01 | End: 2022-11-06

## 2022-11-01 RX ADMIN — GABAPENTIN 100 MG: 300 CAPSULE ORAL at 15:28

## 2022-11-01 RX ADMIN — ACETAMINOPHEN 650 MG: 325 TABLET ORAL at 06:21

## 2022-11-01 RX ADMIN — SODIUM CHLORIDE, PRESERVATIVE FREE 10 ML: 5 INJECTION INTRAVENOUS at 06:00

## 2022-11-01 RX ADMIN — ALLOPURINOL 100 MG: 100 TABLET ORAL at 09:29

## 2022-11-01 RX ADMIN — GABAPENTIN 100 MG: 300 CAPSULE ORAL at 21:27

## 2022-11-01 RX ADMIN — ACETAMINOPHEN 650 MG: 325 TABLET ORAL at 12:20

## 2022-11-01 RX ADMIN — OXYCODONE 15 MG: 5 TABLET ORAL at 21:27

## 2022-11-01 RX ADMIN — METHADONE HYDROCHLORIDE 60 MG: 10 CONCENTRATE ORAL at 09:28

## 2022-11-01 RX ADMIN — COLCHICINE 0.6 MG: 0.6 TABLET, FILM COATED ORAL at 10:00

## 2022-11-01 RX ADMIN — PANTOPRAZOLE SODIUM 40 MG: 40 TABLET, DELAYED RELEASE ORAL at 07:31

## 2022-11-01 RX ADMIN — SODIUM CHLORIDE, PRESERVATIVE FREE 10 ML: 5 INJECTION INTRAVENOUS at 13:37

## 2022-11-01 RX ADMIN — CASTOR OIL AND BALSAM, PERU: 788; 87 OINTMENT TOPICAL at 10:01

## 2022-11-01 RX ADMIN — OXYCODONE 15 MG: 5 TABLET ORAL at 03:22

## 2022-11-01 RX ADMIN — DOXAZOSIN MESYLATE 2 MG: 2 TABLET ORAL at 09:00

## 2022-11-01 RX ADMIN — GABAPENTIN 100 MG: 300 CAPSULE ORAL at 09:29

## 2022-11-01 RX ADMIN — LACTULOSE 45 ML: 20 SOLUTION ORAL at 21:29

## 2022-11-01 RX ADMIN — CARVEDILOL 3.12 MG: 3.12 TABLET, FILM COATED ORAL at 16:27

## 2022-11-01 RX ADMIN — LACTULOSE 45 ML: 20 SOLUTION ORAL at 09:28

## 2022-11-01 RX ADMIN — OXYCODONE 15 MG: 5 TABLET ORAL at 15:28

## 2022-11-01 RX ADMIN — LEVOTHYROXINE SODIUM 125 MCG: 0.12 TABLET ORAL at 07:31

## 2022-11-01 RX ADMIN — ACETAMINOPHEN 650 MG: 325 TABLET ORAL at 18:24

## 2022-11-01 RX ADMIN — CASTOR OIL AND BALSAM, PERU: 788; 87 OINTMENT TOPICAL at 21:35

## 2022-11-01 RX ADMIN — POLYETHYLENE GLYCOL 3350 17 G: 17 POWDER, FOR SOLUTION ORAL at 09:29

## 2022-11-01 RX ADMIN — TAMSULOSIN HYDROCHLORIDE 0.4 MG: 0.4 CAPSULE ORAL at 09:29

## 2022-11-01 RX ADMIN — POLYETHYLENE GLYCOL 3350 17 G: 17 POWDER, FOR SOLUTION ORAL at 18:24

## 2022-11-01 RX ADMIN — OXYCODONE 15 MG: 5 TABLET ORAL at 18:24

## 2022-11-01 RX ADMIN — SENNOSIDES 17.2 MG: 8.6 TABLET, FILM COATED ORAL at 09:29

## 2022-11-01 RX ADMIN — SENNOSIDES 17.2 MG: 8.6 TABLET, FILM COATED ORAL at 18:24

## 2022-11-01 RX ADMIN — CARVEDILOL 3.12 MG: 3.12 TABLET, FILM COATED ORAL at 07:31

## 2022-11-01 RX ADMIN — OXYCODONE 15 MG: 5 TABLET ORAL at 12:20

## 2022-11-01 RX ADMIN — OXYCODONE 15 MG: 5 TABLET ORAL at 06:21

## 2022-11-01 RX ADMIN — LACTULOSE 45 ML: 20 SOLUTION ORAL at 15:28

## 2022-11-01 RX ADMIN — ENOXAPARIN SODIUM 30 MG: 100 INJECTION SUBCUTANEOUS at 09:28

## 2022-11-01 RX ADMIN — OXYCODONE 15 MG: 5 TABLET ORAL at 09:29

## 2022-11-01 NOTE — PROGRESS NOTES
Problem: Dysphagia (Adult)  Goal: *Acute Goals and Plan of Care (Insert Text)  Description: Speech Pathology Goals  Initiated 10/26/2022    1. Patient will tolerate Easy to Chew/Thin Liquid diet without signs of aspiration or adverse effects within 7 days. Outcome: Resolved/Met     SPEECH LANGUAGE PATHOLOGY DYSPHAGIA TREATMENT/DISCHARGE  Patient: Irineo Clayton (6 y.o. male)  Date: 11/1/2022  Diagnosis: COPD with acute exacerbation ([facility] [J44.1] <principal problem not specified>      Precautions:  Fall, Contact (ESBL wound)    ASSESSMENT:  Patient sitting upright in bed for breakfast.  Patient able to feed himself and demonstrated reduced rotary chew with solids. Mastication and oral transit/clearance is complete. Patient taking large bites and successive sips but tolerated all without overt s/s aspiration. Given bedside presentation and tolerance of diet to date feel patient is safe to continue easy to chew diet, thin liquids. Patient will benefit from upright positioning for meals and tray set up. SLP will sign off at this time. PLAN:  Easy to chew diet  Thin liquids  Upright positioning for meals  Tray set up, cup up food  Medication at tolerated  Patient will be discharged from acute skilled speech therapy at this time. Rationale for discharge:  Goals achieved    Discharge Recommendations:  None     SUBJECTIVE:   Patient stated Thank you. OBJECTIVE:   Cognitive and Communication Status:  Neurologic State: Alert  Orientation Level: Oriented X4  Cognition: Follows commands    Perception: Appears intact    Perseveration: No perseveration noted    Safety/Judgement: Awareness of environment  Dysphagia Treatment:  Oral Assessment:     P.O. Trials:  Patient Position: Upright in bed  Vocal quality prior to P.O.: No impairment  Consistency Presented: Thin liquid; Solid;Puree  How Presented: Self-fed/presented;Spoon;Straw;Successive swallows     Bolus Acceptance: No impairment  Bolus Formation/Control: No impairment     Propulsion: No impairment  Oral Residue: None        Aspiration Signs/Symptoms: None                      NOMS:   The NOMS functional outcome measure was used to quantify this patient's level of swallowing impairment. Based on the NOMS, the patient was determined to be at level 5 for swallow function     NOMS Swallowing Levels:  Level 1 (CN): NPO  Level 2 (CM): NPO but takes consistency in therapy  Level 3 (CL): Takes less than 50% of nutrition p.o. and continues with nonoral feedings; and/or safe with mod cues; and/or max diet restriction  Level 4 (CK): Safe swallow but needs mod cues; and/or mod diet restriction; and/or still requires some nonoral feeding/supplements  Level 5 (CJ): Safe swallow with min diet restriction; and/or needs min cues  Level 6 (CI): Independent with p.o.; rare cues; usually self cues; may need to avoid some foods or needs extra time  Level 7 ([address] [place] [address]): Independent for all p.o.  MARISOL. (2003). National Outcomes Measurement System (NOMS): Adult Speech-Language Pathology User's Guide. After treatment:   Patient left in no apparent distress in bed, Call bell within reach, and Nursing notified    COMMUNICATION/EDUCATION:   Patient was educated regarding role of SLP, diet and POC. Patient verbalized understanding and stated \"thank you. \"    The patient's plan of care including recommendations, planned interventions, and recommended diet changes were discussed with: Registered nurse.      ANTWAN Al  Time Calculation: 15 mins

## 2022-11-01 NOTE — PROGRESS NOTES
Problem: Self Care Deficits Care Plan (Adult)  Goal: *Acute Goals and Plan of Care (Insert Text)  Description: FUNCTIONAL STATUS PRIOR TO ADMISSION: had L AKA at [facility] 5 weeks ago, female friend has been assisting with sliding board transfers or stand pivot transfers to and from power wheelchair or BSC SBA, sponge bathing with bath wipes with assist for RLE, able to perform UB ADLS with set up, assist with toileting and LB dressing, had recently stopped using grace lift due to improved independence with transfers, going to OP PT/OT at [facility] and they were setting up aide services, pt needs a new hospital bed due to it being in disrepair, uses power chair for mobility independently with RUE as LUE is non functional from old [address]: The patient lived alone with a friend to provide assistance. Pt reports his friend has been assisting since d/c from [facility] and that she may not be able to continue to assist long term    Occupational Therapy Goals:  Initiated 10/17/2022  Weekly reevaluation/reevaluation post procedure 11/1/2022: no goalsl met-Continue below:  1. Patient will perform grooming seated with good balance with noe technique and supervision implements provided within 7 days. 2. Patient will perform upper body dressing with supervision/set-up within 7 days. 3. Patient will perform toileting with moderate assistance  within 7 days. 4. Patient will perform sponge bath seated with minimal assist within 7 days. 5. Patient will transfer from drop arm bedside commode or drop arm recliner with moderate assistance with best technique within 7 days.       Outcome: Progressing Towards Goal   OCCUPATIONAL THERAPY TREATMENT/ weekly re-evaluation   Patient: Romy Mack (41 y.o. male)  Date: 11/1/2022  Diagnosis: COPD with acute exacerbation ([facility] [address] [J44.1] <principal problem not specified>      Precautions: Fall, Contact (ESBL wound)  Chart, occupational therapy assessment, plan of care, and goals were reviewed. ASSESSMENT, no goals met, continue:  Patient continues with skilled OT services and is progressing towards goals after inability to participate due to sever back pain. Patient agreeable for EOB activity with overall min A to CGA with fair ability to roll due to girth for back protection s/p POD 5 post T12 kyphoplasty, pain managed this session and simple direct commands followed, bed linens changed with good to fair sitting balance and rolling with overall CGA. Patient agreeable for in bed UB ADLs with setup due to R UE functional use only with L hemiplegia. Patient led session with therapists requests and RN present and encouraged hygiene/cleanliness due to days of refusal for assistance despite max encouragement from staff. As patient is now more participatory, recommend sliding board transfer when able to scoot self to deem if patient is able to return to PLOF after L AKA at home from bed-wheelchair-BSC on sliding board, need to confirm all DME and caregiver support. Patient remains with need for total A for LB ADLs and toileting. Currently recommend rehab. Due to patient requesting home:  If patient has 24/7 A and all DME needs including grace lift and hospital bed or is able to transfer with sliding board with minimal A then recommend home with [facility] OT with transition back to OP services, however if unable next session recommend rehab. Current Level of Function Impacting Discharge (ADLs): CGA for bed mobility, setup to min A for UB ADLs, total A for LB ADLs    Other factors to consider for discharge: sliding board tx at baseline         PLAN :  Patient continues to benefit from skilled intervention to address the above impairments. Continue treatment per established plan of care to address goals.     Recommend with staff: EOB for UB ADLs as tolerated    Recommend next OT session: sliding board transfer bed-recliner or DABSC    Recommendation for discharge: (in order for the patient to meet his/her long term goals)  To be determined: HH vs SNF pending transfer trial    This discharge recommendation:  A follow-up discussion with the attending provider and/or case management is planned    IF patient discharges home will need the following DME: determine if patient still has wheelchair, sliding board and DABSC       SUBJECTIVE:   Patient stated I can sit up and roll, watch out.     OBJECTIVE DATA SUMMARY:   Cognitive/Behavioral Status:  Neurologic State: Alert  Orientation Level: Oriented X4  Cognition: Follows commands             Functional Mobility and Transfers for ADLs:  Bed Mobility:  Rolling: Contact guard assistance  Supine to Sit: Min A due to need to pull up  Sit to Supine: Stand-by assistance    Transfers:   NT, unsafe          Balance:  Sitting: Intact; High guard  Sitting - Static: Good (unsupported)  Sitting - Dynamic: Fair (occasional)  Standing:  (NT< unsafe to attempt)    ADL Intervention:  Feeding  Food to Mouth: Set-up  Drink to Mouth: Set-up    Grooming  Position Performed: Seated edge of bed  Washing Face: Set-up  Brushing Teeth: Set-up  Cues: Visual/perceptual training/retraining     Attempted to educate on back protection strategies post kyphoplasty    Type of Bath: Chlorhexidine (CHG)    Lower Body Bathing  Perineal  : Total assistance (dependent)    Upper Body [address]: Minimum  assistance    Lower Body Dressing Assistance  Protective Undergarmet: Total assistance (dependent)    Toileting  Bladder Hygiene:  Total assistance (dependent)  Clothing Management: Total assistance (dependent)         Therapeutic Exercises:       Pain:  Controlled, mild back pain per patient at rest    Activity Tolerance:   Fair, requires frequent rest breaks, and observed SOB with activity    After treatment patient left in no apparent distress:   Supine in bed, Heels elevated for pressure relief, Call bell within reach, Bed / chair alarm activated, and Side rails x 3    COMMUNICATION/COLLABORATION:   The patients plan of care was discussed with: Physical therapy assistant and Registered nurse.      Sadia Mclaughlin, CYRUS  Time Calculation: 41 mins

## 2022-11-01 NOTE — PROGRESS NOTES
ADULT PROTOCOL: JET AEROSOL ASSESSMENT    Patient  Sofie Lal     61 y.o.   male     11/1/2022  12:26 AM    Breath Sounds Pre Procedure: Right Breath Sounds: Diminished, Coarse                               Left Breath Sounds: Diminished, Coarse    Breath Sounds Post Procedure: Right Breath Sounds: Coarse                                 Left Breath Sounds: Coarse    Breathing pattern: Pre procedure Breathing Pattern: Regular          Post procedure Breathing Pattern: Regular    Heart Rate: Pre procedure Pulse: 71           Post procedure Pulse: 79    Resp Rate: Pre procedure Respirations: 18           Post procedure Respirations: 18    Peak Flow: Pre bronchodilator             Post bronchodilator               Cough: Pre procedure Cough: Non-productive               Post procedure Cough: Non-productive    Suctioned: NO    Sputum: Pre procedure Sputum amount: Scant  Sputum color/odor: Yellow, White  Sputum consistency:  Thin  Sputum method obtained: Oral                 Post procedure      Oxygen: O2 Device: Nasal cannula   Flow rate (L/min) 2LPM     Changed: NO    SpO2: Pre procedure SpO2: 96 %   with oxygen              Post procedure SpO2: 99 %  with oxygen    Nebulizer Therapy: Current medications Aerosolized Medications: DuoNeb      Changed: YES      Problem List:   Patient Active Problem List   Diagnosis Code    Thromboembolism (HCC) I74.9    Cerebral hemorrhage with hemiparesis (HCC) I61.9, G81.90    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    Cerebral infarction (HCC) I63.9    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    Drug overdose T50.901A    HTN (hypertension) I10    Cellulitis of left lower extremity L03.116    Long term current use of methadone for pain control Z79.891    Major depressive disorder with current active episode F32.9    Physical debility R53.81    Malignant neoplasm of urinary bladder (HCC) C67.9    Brain aneurysm I67.1    Chronic pain G89.29    Neurologic gait dysfunction R26.9    Spasticity R25.2    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 6XAA    Traumatic subarachnoid hemorrhage without loss of consciousness ([address] S06.6X0A    Infected wound T14. 8XXA, L08.9    Sepsis ([address] A41.9    Severe sepsis ([facility]) A41.9, R65.20    Left leg cellulitis L03.116    COPD with acute exacerbation ([address] J44.1       Respiratory Therapist: Edyta Phelan RT

## 2022-11-01 NOTE — PROGRESS NOTES
Pulmonary, Critical Care, and Sleep Medicine      Name: Lorenz Cogan MRN: [id number]   : 1965 Hospital: [facility]   Date: 2022        IMPRESSION:   Acute hypoxic respiratory failure  Atelectasis and mucous plugging of the left lung, possibly due to chronic aspiration  COPD, on no treatment at home, no PFTs available, but he does have emphysematous changes on CT  Chronic oropharyngeal dysphagia due to stroke  3 mm right lung nodule  History of ruptured cerebral aneurysm with residual left-sided hemiparesis  Status post T5 and T6 kyphoplasty 10/27/2022  T12 vertebral endplate compression fracture  CRE UTI  Chronic pain on methadone  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 Mobilize  Continue bronchodilators and mucolytics  Pulmonary toilet including incentive spirometry and Acapella valve, though the patient will need some improvements in pain control to help him improve his cough  Antibiotics. Tobacco cessation counseling  Will need a follow up CT in 1 year due to his 3 mm pulmonary nodule (and as routine screening for tobacco users)  Other issues per primary team     22: Chest very tender. Told pt that mucous not the cause. Neuropathic? No rash to suggest shingles but pt advised to eventually get Shingrix. Pt wants flushot. Notes and labs noted. On examination decreased movement on left chest.  No audible wheeze or rhonchi. Weak cough poor attempt. Complains of chest and back pain with deep inspiratory effort. 10/31/2022: Events of weekend reviewed. Notes and labs noted. Discussed management with Dr. Zhanna Owusu. Chest x-ray from today reviewed. Increased aeration left lung but still with volume loss and increase opacification. Right lung fairly well expanded. On examination decreased movement on left chest.  No audible wheeze or rhonchi. Weak cough poor attempt. Complains of chest and back pain with deep inspiratory effort. Subjective: This patient has been seen and evaluated at the request of Dr. Guerry Skiff for persistent O2 requirement. Patient is a 61 y.o. 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 He had severe back pain at that time and was found to have a T12 inferior vertebral endplate compression fracture. He has had a prolonged hospital stay and had a kyphoplasty of T5 and T6 performed yesterday. During his hospitalization he has had an oxygen requirement which prompted consult to me today. He was treated for COPD exacerbation on admission with 5 days of prednisone, but he has only had 1 albuterol treatment in the last 10 days. He cannot cough because he is limited by pain. He reports that he has chronic issues with aspiration because of his stroke.       Past Medical History:   Diagnosis Date    Aneurysm ([address]     (with stroke) brain    Bladder tumor     Cancer ([address]     bladder CA    Chronic pain     Family history of bladder cancer     GERD (gastroesophageal reflux disease)     Gout     History of vascular access device 04/25/2019    [facility] VAT 4 FR MIDLINE R Cephalic Limited access    History of vascular access device 04/26/2019    4 fr Midline right Cephalic midline access    Hypercholesterolemia     Hypertension     Lesion of bladder     Seizures ([address]     Stroke ([address] 2006    left sided weakness    Thromboembolus ([address]     left leg    Thyroid disease     TIA (transient ischemic attack) 2012      Past Surgical History:   Procedure Laterality Date    HX ORTHOPAEDIC      R arthroscopy    HX OTHER SURGICAL      x2 L foot    HX UROLOGICAL  04/20/2018    Cystoscopy, TURBT (greater than 5 cm resected) Dr. Brooklyn Pena, [facility]. IR KYPHOPLASTY THORACIC  10/27/2022    KNEE ARTHROSCP HARV      left knee    TRANSURETHRAL RESEC BLADDER NECK  08/10/2018      Prior to Admission medications    Medication Sig Start Date End Date Taking? Authorizing Provider   carvediloL (COREG) 6.25 mg tablet Take 6.25 mg by mouth two (2) times daily (with meals). Yes Provider, Historical   doxazosin (CARDURA) 2 mg tablet Take 2 mg by mouth daily. Yes Provider, Historical   pantoprazole (PROTONIX) 40 mg tablet Take 40 mg by mouth daily. Yes Provider, Historical   metFORMIN ER (GLUCOPHAGE XR) 500 mg tablet Take 500 mg by mouth daily. 9/27/22  Yes Provider, Historical   predniSONE (DELTASONE) 20 mg tablet Take 20 mg by mouth daily. 9/27/22  Yes Provider, Historical   tamsulosin (FLOMAX) 0.4 mg capsule Take 1 capsule by mouth daily after dinner 3/11/22  Yes Tori Sue MD   levothyroxine (SYNTHROID) 125 mcg tablet Take 125 mcg by mouth Daily (before breakfast). Yes Provider, Historical   ibuprofen (MOTRIN) 200 mg tablet Take 400 mg by mouth every six (6) hours as needed for Pain. Yes Provider, Historical   gabapentin (NEURONTIN) 100 mg capsule Take 200 mg by mouth three (3) times daily. Yes Provider, Historical   methadone (DOLOPHINE) 10 mg/mL solution Take 60 mg by mouth daily. Indications: excessive pain   Yes Provider, Historical   allopurinoL (ZYLOPRIM) 300 mg tablet Take 1 Tablet by mouth. Provider, Historical   colchicine 0.6 mg tablet Take 0.6 mg by mouth daily as needed for Gout or Pain.  Indications: acute inflammation of the joints due to gout attack    Provider, Historical     Allergies   Allergen Reactions    Sulfamethoxazole-Trimethoprim Rash     L eye and L hand    Ciprofloxacin Hives     Per pcp records    Codeine Hives     Tolerates dilaudid, oxycodone    Cymbalta [Duloxetine] Other (comments)     Confusion and memory loss    Lyrica [Pregabalin] Hives    Sulfa (Sulfonamide Antibiotics) Rash    Ultram [Tramadol] Hives    Vancomycin Shortness of Breath    Zosyn [Piperacillin-Tazobactam] Rash      Social History     Tobacco Use    Smoking status: Every Day     Packs/day: 0.50     Types: Cigarettes    Smokeless tobacco: Never    Tobacco comments:     instructed not to smoke 24 hrs prior surgery   Substance Use Topics    Alcohol use:  Yes     Alcohol/week: 6.0 standard drinks     Types: 6 Cans of beer per week     Comment: occasional      Family History   Problem Relation Age of Onset    Diabetes Father     Lung Disease Father     Diabetes Sister     Diabetes Brother     Cancer Paternal Grandfather         Bladder        Current Facility-Administered Medications   Medication Dose Route Frequency    lactulose (CHRONULAC) 10 gram/15 mL solution 45 mL  30 g Oral TID    polyethylene glycol (MIRALAX) packet 17 g  17 g Oral BID    acetaminophen (TYLENOL) tablet 650 mg  650 mg Oral Q6H    senna (SENOKOT) tablet 17.2 mg  2 Tablet Oral BID    allopurinoL (ZYLOPRIM) tablet 100 mg  100 mg Oral DAILY    colchicine tablet 0.6 mg  0.6 mg Oral DAILY    sodium chloride (NS) flush 5-40 mL  5-40 mL IntraVENous Q8H    balsam peru-castor oiL (VENELEX) ointment   Topical BID    carvediloL (COREG) tablet 3.125 mg  3.125 mg Oral BID WITH MEALS    doxazosin (CARDURA) tablet 2 mg  2 mg Oral DAILY    pantoprazole (PROTONIX) tablet 40 mg  40 mg Oral ACB    enoxaparin (LOVENOX) injection 30 mg  30 mg SubCUTAneous Q12H    fentaNYL (DURAGESIC) 12 mcg/hr patch 1 Patch  1 Patch TransDERmal Q72H    gabapentin (NEURONTIN) capsule 100 mg  100 mg Oral TID    levothyroxine (SYNTHROID) tablet 125 mcg  125 mcg Oral ACB    methadone (DOLOPHINE) 10 mg/mL concentrated solution 60 mg  60 mg Oral DAILY    tamsulosin (FLOMAX) capsule 0.4 mg  0.4 mg Oral DAILY    sodium chloride (NS) flush 5-40 mL  5-40 mL IntraVENous Q8H    lidocaine 4 % patch 2 Patch  2 Patch TransDERmal Q24H       Review of Systems:  A comprehensive review of systems was negative except for that written in the HPI. Objective:   Vital Signs:    Visit Vitals  /77   Pulse 86   Temp 97.9 °F (36.6 °C)   Resp 18   Ht 6' (1.829 m)   Wt 103 kg (227 lb)   SpO2 96%   BMI 30.79 kg/m²       O2 Device: Nasal cannula   O2 Flow Rate (L/min): 2 l/min   Temp (24hrs), Av.1 °F (36.7 °C), Min:97.9 °F (36.6 °C), Max:98.3 °F (36.8 °C)       Intake/Output:   Last shift:      701 - 1900  In: 300 [P.O.:300]  Out: -   Last 3 shifts: 10/30 1901 -  07  In: -   Out: 1150 [Urine:1150]    Intake/Output Summary (Last 24 hours) at 2022 1444  Last data filed at 2022 0857  Gross per 24 hour   Intake 300 ml   Output 350 ml   Net -50 ml        Physical Exam:   General:  Alert, cooperative, no distress    Head:  Normocephalic, without obvious abnormality, atraumatic. Eyes:  Conjunctivae/corneas clear. Nose: Nares normal. Septum midline. Mucosa normal.    Throat: Lips, mucosa, and tongue normal.     Neck: Supple, symmetrical, trachea midline    Back:   Unable to examine due to pain with movement   Lungs:   Near-absent breath sounds on left    Chest wall:  No tenderness or deformity. Heart:  Regular rate and rhythm    Abdomen:   Soft, non-tender. Bowel sounds normal.     Extremities: Extremities normal, atraumatic, no cyanosis, left AKA   Skin: Skin color, texture, turgor normal. No rashes or lesions   Lymph nodes: Cervical, supraclavicular nodes normal.   Neurologic: Grossly nonfocal     Data review:   No results found for this or any previous visit (from the past 24 hour(s)).     Imaging:  I have personally reviewed the patients radiographs and have reviewed the reports:  Near complete opacification of the left lung with tracheal deviation to the left, consistent with atelectasis mucous plugging        Haven Leonard MD

## 2022-11-01 NOTE — PROGRESS NOTES
Hospitalist Progress Note    NAME: Brody Jean   :  1965   MRN:  [id number]     Brody Jean is a 61 y.o. male with past medical history as listed below who presents with complaints of severe back pain reportedly under shoulder blades reportedly 10/10 pain that is constant/aching as well as mild worsening of baseline shortness of breath secondary to underlying COPD. Assessment / Plan:  T12 inferior vertebral endplate compression fracture  Acute low back pain due to above  -CT abdomen shows possibility of T12 inferior vertebral endplate compression fracture. -MRI shows evidence of acute T12 fracture. - kyphoplasty performed 10/27/22  - discussed with IR, procedure should be relatively immediately curing of pain related to location of procedure  - no saddle anesthesia, no numbness, no focal weakness, no issues with urination  - still with persistent back pain. Ordered repeat MRI lumbar and thoracic, requires anesthesia support. Planned for 22, NPO at midnight  -Pain control with fentanyl patch, methadone 60 mg daily  - increased oxycodone to 15 mg every 3 hours as needed. Patient shared that he was on 30mg q3h for 2 years previously and that he does not wish to be taking that high of doses in the future. Is aware of their addictive qualities. - PT -> SNF  - bowel regimen, increased 22 with bisacodyl    Carbapenem resistant urinary tract infection  History of recent Gross catheter and also straight cath  -Urine culture grew Carbapenem resistant Klebsiella pneumonia  -Based on urine culture and sensitivity, currently on gentamicin  - consulted ID 10/26/22, continued Gentamicin through 10/28/22, 10 day total course    Suspected aspiration pneumonia  Acute hypoxic respiratory failure  -MRI shows evidence of aspiration pneumonia.   Chest x-ray also shows basilar infiltrates with possible effusion  -He is also reporting some worsening of shortness of breath along with cough and small amount of phlegm  -started empiric antibiotics with cefepime and Flagyl, started 10/25/22  - changed to Cefdinir flagyl, EOT 10/31/22, 7 day course  -Aspiration precautions.  -We will change to dysphagia diet. We will consult speech therapy, appreciate assistance  -Has leukocytosis of 14,000 but he was recently on steroids which may be contributing. Procal normal  -Continue oxygen nasal cannula, Wean as tolerated  - CXR 10/28/22 showed L hemithorax opacity possible LLL collapse, L pleural effusion  - incentive spirometery  - consulted Pulmonology for persistent O2 requirement, added bronchodilators and mucolytics  - followup CT in 1 year for 3mm pulm nodule    Possible gout flare - improved  Describes knee pain, similar to previous gout flare  - started colchicine 1.2 x1, 0.6 x1, then 0.6 daily  - started allopurinol 100 daily. Was prescribed 300 daily but not taking consistently, so essentially starting over    Chronic pain on methadone  -Continue home methadone. Rest of pain meds as above    Debility secondary to CVA and recent left AKA  -Continue PT OT    Chronic wounds  -Continue wound care    Mild COPD exacerbation  Prn duonebs  S/p prednisone    Leukocytosis likely due to steroids - improved  - He is afebrile  - monitor    GERD  Protonix daily     Essential hypertension  Continue PTA Coreg     Hypothyroidism  Continue PTA Synthroid     Incidental findings requiring outpatient follow up/ evaluation:  3 mm right lung nodule. -Guidelines recommend repeat scan in 12 months  Increase in size of intermediate density left renal lesion. Recommend  follow-up with ultrasound in 6 months. Left AKA    30.0 - 39.9 Obese / Body mass index is 30.79 kg/m². Estimated discharge date: 11/3/22  Barriers: pain control, improvement in resp status, MRIs    Code status: Full  Prophylaxis: Lovenox  Recommended Disposition: SNF     Subjective:   Patient with persistent back pain. No saddle anesthesia.  States he has not had bowel movement in a few days. Objective:     VITALS:   Last 24hrs VS reviewed since prior progress note. Most recent are:  Patient Vitals for the past 24 hrs:   Temp Pulse Resp BP SpO2   11/01/22 1548 98.2 °F (36.8 °C) 74 18 115/70 92 %   11/01/22 0812 97.9 °F (36.6 °C) 86 18 123/77 96 %   10/31/22 2130 98.2 °F (36.8 °C) 75 18 138/86 92 %         Intake/Output Summary (Last 24 hours) at 11/1/2022 1849  Last data filed at 11/1/2022 0857  Gross per 24 hour   Intake 300 ml   Output --   Net 300 ml          I had a face to face encounter and independently examined this patient  as outlined below:  PHYSICAL EXAM:  General: Mild distress, pain with motion of torso  Resp:  Trace crackles, no wheezing, nonlabored  CV:  Regular  rhythm,  No edema  GI:  Soft, Non distended, Non tender. +Bowel sounds  Neurologic:  Alert and oriented X 4, no focal deficits  Psych:    Not anxious nor agitated  Skin:  Skin tears and wounds in various stages of healing on extremities  Extremities      Left AKA, R knee tender to touch    Reviewed most current lab test results and cultures  YES  Reviewed most current radiology test results   YES  Review and summation of old records today    NO  Reviewed patient's current orders and MAR    YES  PMH/ reviewed - no change compared to H&P  ________________________________________________________________________  Care Plan discussed with:    Comments   Patient y    Family      RN y    Care Manager     Consultant                       y Multidiciplinary team rounds were held today with , nursing, pharmacist and clinical coordinator. Patient's plan of care was discussed; medications were reviewed and discharge planning was addressed. ________________________________________________________________________    Procedures: see electronic medical records for all procedures/Xrays and details which were not copied into this note but were reviewed prior to creation of Plan. LABS:  I reviewed today's most current labs and imaging studies.   Pertinent labs include:  Recent Labs     10/30/22  0211   WBC 9.2   HGB 8.5*   HCT 28.9*   *       Recent Labs     10/30/22  0211     136   K 4.1  3.7   CL 99  98   CO2 30  31   *  107*   BUN 6  6   CREA 0.71  0.75   CA 9.1  9.0   MG 1.9   PHOS 5.2*       Signed: Julián Ellis MD

## 2022-11-01 NOTE — PROGRESS NOTES
Problem: Mobility Impaired (Adult and Pediatric)  Goal: *Acute Goals and Plan of Care (Insert Text)  Description: FUNCTIONAL STATUS PRIOR TO ADMISSION: Pt lives at home, had been alone but recently since amputation his friend has been staying with him. Pt pt's request, talked with friend on phone to get status PTA. She stated pt was functioning at w/c level with an electric scooter; needed SBA for either pivot transfer or sliding board transfer (does have grace but she states he did better without); needed help with dressing and uses wipes for bathing because he could not get onto shower chair any longer. She states he was going [facility]. She states they were apparently working on getting him a care aide. Pt also states that he has a  for his L AKA (5 weeks ago) but it no longer fits. He was unable to fill in where he got it. HOME SUPPORT PRIOR TO ADMISSION: The patient lived alone with friend to provide assistance. Physical Therapy Goals  Initiated 10/17/2022  1. Patient will move from supine to sit and sit to supine , scoot up and down, and roll side to side in bed with modified independence within 7 day(s). 2.  Patient will transfer from bed to chair and chair to bed with minimal assistance/contact guard assist using the least restrictive device within 7 day(s). 3.  Patient will show good sitting balance static and dynamic for safe bed mobility and transfers within 7 days. Physical Therapy Goals  Re-Assessed 10/28/2022  1. Patient will move from supine to sit and sit to supine , scoot up and down, and roll side to side in bed with minimal assistance within 7 day(s). 2.  Patient will transfer from bed to chair and chair to bed with moderate assist using the least restrictive device within 7 day(s). 3.  Patient will show good sitting balance static and dynamic for safe bed mobility and transfers within 7 days.   4.  Patient will demonstrate 5 exercises without verbal cueing to improve UE/LE ROM and strength within 7 days. Outcome: Progressing Towards Goal   PHYSICAL THERAPY TREATMENT  Patient: Brody Jean (9 y.o. male)  Date: 11/1/2022  Diagnosis: COPD with acute exacerbation ([facility] [J44.1] <principal problem not specified>      Precautions: Fall, Contact (ESBL wound)  Chart, physical therapy assessment, plan of care and goals were reviewed. ASSESSMENT  Patient continues with skilled PT services and is progressing towards goals. Pt presents with decreased strength and endurance. Pt reported increased pain with but agreeable to sit EOB. Pt performed supine to sit at min  A for trunk. Pt mia good sitting balance but unable to scoot both hips to EOB. Pt able to performed sit to supine at SBA with total A to scoot to [facility]. Pt reported pain while sitting EOB, but sitting with good sitting balance. Pts pain seemed to be better controled today. Current Level of Function Impacting Discharge (mobility/balance): bed mobility at min A /SBA. Other factors to consider for discharge: decreased strength and endurance, pain          PLAN :  Patient continues to benefit from skilled intervention to address the above impairments. Continue treatment per established plan of care. to address goals. Recommendation for discharge: (in order for the patient to meet his/her long term goals)  Rehab     This discharge recommendation:  Has been made in collaboration with the attending provider and/or case management    IF patient discharges home will need the following DME: to be determined (TBD)       SUBJECTIVE:   Patient stated  Thanks for coming to see me.     OBJECTIVE DATA SUMMARY:   Critical Behavior:  Neurologic State: Alert  Orientation Level: Oriented X4  Cognition: Follows commands  Safety/Judgement: Awareness of environment  Functional Mobility Training:  Bed Mobility:  Rolling: Contact guard assistance  Supine to Sit: Minimum assistance  Sit to Supine: Stand-by assistance  Scooting: Total assistance;Maximum assistance              Balance:  Sitting: Intact; High guard  Sitting - Static: Good (unsupported)  Sitting - Dynamic: Fair (occasional)  Standing:  (NT< unsafe to attempt)    Pain Rating:  Pt reported increased back pain but with improved mobility for last session. Activity Tolerance:   Fair, desaturates with exertion and requires oxygen, and requires rest breaks    After treatment patient left in no apparent distress:   Supine in bed, Call bell within reach, and Side rails x 3    COMMUNICATION/COLLABORATION:   The patients plan of care was discussed with: Occupational therapist and Registered nurse.      Cabrera Weller PTA   Time Calculation: 15 mins

## 2022-11-01 NOTE — PROGRESS NOTES
Pulse Oximetry Assessment:    81% at rest on room air (If this value is <90%, you do not need to proceed and the other fields may be deleted)  N/A% while ambulating on room air  94% at rest on 1 LPM  N/A% while ambulating on N/A LPM    If patient will require home oxygen, please obtain an order from the attending physician for a case management consult to set up home oxygen.

## 2022-11-02 ENCOUNTER — ANESTHESIA (OUTPATIENT)
Dept: MRI IMAGING | Age: 57
DRG: 321 | End: 2022-11-02
Payer: MEDICAID

## 2022-11-02 ENCOUNTER — HOSPITAL ENCOUNTER (OUTPATIENT)
Dept: MRI IMAGING | Age: 57
Discharge: HOME | End: 2022-11-02
Attending: STUDENT IN AN ORGANIZED HEALTH CARE EDUCATION/TRAINING PROGRAM
Payer: MEDICAID

## 2022-11-02 ENCOUNTER — ANESTHESIA EVENT (OUTPATIENT)
Dept: MRI IMAGING | Age: 57
DRG: 321 | End: 2022-11-02
Payer: MEDICAID

## 2022-11-02 VITALS
RESPIRATION RATE: 12 BRPM | OXYGEN SATURATION: 91 % | TEMPERATURE: 98.1 F | DIASTOLIC BLOOD PRESSURE: 64 MMHG | SYSTOLIC BLOOD PRESSURE: 99 MMHG | HEART RATE: 87 BPM

## 2022-11-02 LAB
ANION GAP SERPL CALC-SCNC: 10 MMOL/L (ref 5–15)
BASOPHILS # BLD: 0 K/UL (ref 0–0.1)
BASOPHILS NFR BLD: 0 % (ref 0–1)
BUN SERPL-MCNC: 6 MG/DL (ref 6–20)
BUN/CREAT SERPL: 7 (ref 12–20)
CALCIUM SERPL-MCNC: 9.1 MG/DL (ref 8.5–10.1)
CHLORIDE SERPL-SCNC: 98 MMOL/L (ref 97–108)
CO2 SERPL-SCNC: 30 MMOL/L (ref 21–32)
CREAT SERPL-MCNC: 0.86 MG/DL (ref 0.7–1.3)
DIFFERENTIAL METHOD BLD: ABNORMAL
EOSINOPHIL # BLD: 0.9 K/UL (ref 0–0.4)
EOSINOPHIL NFR BLD: 9 % (ref 0–7)
ERYTHROCYTE [DISTWIDTH] IN BLOOD BY AUTOMATED COUNT: 16.2 % (ref 11.5–14.5)
GLUCOSE SERPL-MCNC: 105 MG/DL (ref 65–100)
HCT VFR BLD AUTO: 30.8 % (ref 36.6–50.3)
HGB BLD-MCNC: 9.3 G/DL (ref 12.1–17)
IMM GRANULOCYTES # BLD AUTO: 0.1 K/UL (ref 0–0.04)
IMM GRANULOCYTES NFR BLD AUTO: 1 % (ref 0–0.5)
LYMPHOCYTES # BLD: 2.1 K/UL (ref 0.8–3.5)
LYMPHOCYTES NFR BLD: 21 % (ref 12–49)
MAGNESIUM SERPL-MCNC: 2 MG/DL (ref 1.6–2.4)
MCH RBC QN AUTO: 24.1 PG (ref 26–34)
MCHC RBC AUTO-ENTMCNC: 30.2 G/DL (ref 30–36.5)
MCV RBC AUTO: 79.8 FL (ref 80–99)
MONOCYTES # BLD: 1.1 K/UL (ref 0–1)
MONOCYTES NFR BLD: 11 % (ref 5–13)
NEUTS SEG # BLD: 5.8 K/UL (ref 1.8–8)
NEUTS SEG NFR BLD: 58 % (ref 32–75)
NRBC # BLD: 0 K/UL (ref 0–0.01)
NRBC BLD-RTO: 0 PER 100 WBC
PHOSPHATE SERPL-MCNC: 5.8 MG/DL (ref 2.6–4.7)
PLATELET # BLD AUTO: 546 K/UL (ref 150–400)
PMV BLD AUTO: 9.2 FL (ref 8.9–12.9)
POTASSIUM SERPL-SCNC: 3.5 MMOL/L (ref 3.5–5.1)
RBC # BLD AUTO: 3.86 M/UL (ref 4.1–5.7)
SODIUM SERPL-SCNC: 138 MMOL/L (ref 136–145)
WBC # BLD AUTO: 10.1 K/UL (ref 4.1–11.1)

## 2022-11-02 PROCEDURE — 74011250636 HC RX REV CODE- 250/636: Performed by: STUDENT IN AN ORGANIZED HEALTH CARE EDUCATION/TRAINING PROGRAM

## 2022-11-02 PROCEDURE — 74011000250 HC RX REV CODE- 250: Performed by: NURSE ANESTHETIST, CERTIFIED REGISTERED

## 2022-11-02 PROCEDURE — 77030026438 HC STYL ET INTUB CARD -A: Performed by: STUDENT IN AN ORGANIZED HEALTH CARE EDUCATION/TRAINING PROGRAM

## 2022-11-02 PROCEDURE — 74011250637 HC RX REV CODE- 250/637: Performed by: INTERNAL MEDICINE

## 2022-11-02 PROCEDURE — 36415 COLL VENOUS BLD VENIPUNCTURE: CPT

## 2022-11-02 PROCEDURE — 72146 MRI CHEST SPINE W/O DYE: CPT

## 2022-11-02 PROCEDURE — 85025 COMPLETE CBC W/AUTO DIFF WBC: CPT

## 2022-11-02 PROCEDURE — 76060000034 HC ANESTHESIA 1.5 TO 2 HR

## 2022-11-02 PROCEDURE — 77010033678 HC OXYGEN DAILY

## 2022-11-02 PROCEDURE — 74011250636 HC RX REV CODE- 250/636: Performed by: NURSE ANESTHETIST, CERTIFIED REGISTERED

## 2022-11-02 PROCEDURE — 83735 ASSAY OF MAGNESIUM: CPT

## 2022-11-02 PROCEDURE — 74011000250 HC RX REV CODE- 250: Performed by: STUDENT IN AN ORGANIZED HEALTH CARE EDUCATION/TRAINING PROGRAM

## 2022-11-02 PROCEDURE — 80048 BASIC METABOLIC PNL TOTAL CA: CPT

## 2022-11-02 PROCEDURE — 84100 ASSAY OF PHOSPHORUS: CPT

## 2022-11-02 PROCEDURE — 94760 N-INVAS EAR/PLS OXIMETRY 1: CPT

## 2022-11-02 PROCEDURE — 77030008684 HC TU ET CUF COVD -B: Performed by: NURSE ANESTHETIST, CERTIFIED REGISTERED

## 2022-11-02 PROCEDURE — 76210000006 HC OR PH I REC 0.5 TO 1 HR

## 2022-11-02 PROCEDURE — 65270000046 HC RM TELEMETRY

## 2022-11-02 PROCEDURE — 74011250637 HC RX REV CODE- 250/637: Performed by: STUDENT IN AN ORGANIZED HEALTH CARE EDUCATION/TRAINING PROGRAM

## 2022-11-02 PROCEDURE — 72148 MRI LUMBAR SPINE W/O DYE: CPT

## 2022-11-02 RX ORDER — FENTANYL CITRATE 50 UG/ML
INJECTION, SOLUTION INTRAMUSCULAR; INTRAVENOUS AS NEEDED
Status: DISCONTINUED | OUTPATIENT
Start: 2022-11-02 | End: 2022-11-02 | Stop reason: HOSPADM

## 2022-11-02 RX ORDER — ONDANSETRON 2 MG/ML
INJECTION INTRAMUSCULAR; INTRAVENOUS AS NEEDED
Status: DISCONTINUED | OUTPATIENT
Start: 2022-11-02 | End: 2022-11-02 | Stop reason: HOSPADM

## 2022-11-02 RX ORDER — DEXAMETHASONE SODIUM PHOSPHATE 4 MG/ML
10 INJECTION, SOLUTION INTRA-ARTICULAR; INTRALESIONAL; INTRAMUSCULAR; INTRAVENOUS; SOFT TISSUE ONCE
Status: COMPLETED | OUTPATIENT
Start: 2022-11-02 | End: 2022-11-02

## 2022-11-02 RX ORDER — ONDANSETRON 2 MG/ML
4 INJECTION INTRAMUSCULAR; INTRAVENOUS AS NEEDED
Status: DISCONTINUED | OUTPATIENT
Start: 2022-11-02 | End: 2022-11-06 | Stop reason: HOSPADM

## 2022-11-02 RX ORDER — HYDROMORPHONE HYDROCHLORIDE 2 MG/ML
0.2 INJECTION, SOLUTION INTRAMUSCULAR; INTRAVENOUS; SUBCUTANEOUS
Status: DISCONTINUED | OUTPATIENT
Start: 2022-11-02 | End: 2022-11-06 | Stop reason: HOSPADM

## 2022-11-02 RX ORDER — SODIUM CHLORIDE 9 MG/ML
INJECTION, SOLUTION INTRAVENOUS
Status: DISCONTINUED | OUTPATIENT
Start: 2022-11-02 | End: 2022-11-02 | Stop reason: HOSPADM

## 2022-11-02 RX ORDER — IPRATROPIUM BROMIDE AND ALBUTEROL SULFATE 2.5; .5 MG/3ML; MG/3ML
SOLUTION RESPIRATORY (INHALATION)
Status: DISCONTINUED
Start: 2022-11-02 | End: 2022-11-03 | Stop reason: HOSPADM

## 2022-11-02 RX ORDER — ROCURONIUM BROMIDE 10 MG/ML
INJECTION, SOLUTION INTRAVENOUS AS NEEDED
Status: DISCONTINUED | OUTPATIENT
Start: 2022-11-02 | End: 2022-11-02 | Stop reason: HOSPADM

## 2022-11-02 RX ORDER — PROPOFOL 10 MG/ML
INJECTION, EMULSION INTRAVENOUS AS NEEDED
Status: DISCONTINUED | OUTPATIENT
Start: 2022-11-02 | End: 2022-11-02 | Stop reason: HOSPADM

## 2022-11-02 RX ORDER — LIDOCAINE HYDROCHLORIDE 20 MG/ML
INJECTION, SOLUTION EPIDURAL; INFILTRATION; INTRACAUDAL; PERINEURAL AS NEEDED
Status: DISCONTINUED | OUTPATIENT
Start: 2022-11-02 | End: 2022-11-02 | Stop reason: HOSPADM

## 2022-11-02 RX ORDER — IPRATROPIUM BROMIDE AND ALBUTEROL SULFATE 2.5; .5 MG/3ML; MG/3ML
3 SOLUTION RESPIRATORY (INHALATION)
Status: DISCONTINUED | OUTPATIENT
Start: 2022-11-02 | End: 2022-11-03 | Stop reason: HOSPADM

## 2022-11-02 RX ORDER — PHENYLEPHRINE HCL IN 0.9% NACL 0.4MG/10ML
SYRINGE (ML) INTRAVENOUS AS NEEDED
Status: DISCONTINUED | OUTPATIENT
Start: 2022-11-02 | End: 2022-11-02 | Stop reason: HOSPADM

## 2022-11-02 RX ORDER — SUCCINYLCHOLINE CHLORIDE 20 MG/ML
INJECTION INTRAMUSCULAR; INTRAVENOUS AS NEEDED
Status: DISCONTINUED | OUTPATIENT
Start: 2022-11-02 | End: 2022-11-02 | Stop reason: HOSPADM

## 2022-11-02 RX ORDER — FENTANYL CITRATE 50 UG/ML
25 INJECTION, SOLUTION INTRAMUSCULAR; INTRAVENOUS
Status: DISCONTINUED | OUTPATIENT
Start: 2022-11-02 | End: 2022-11-06 | Stop reason: HOSPADM

## 2022-11-02 RX ORDER — OXYCODONE HYDROCHLORIDE 5 MG/1
10 TABLET ORAL AS NEEDED
Status: DISCONTINUED | OUTPATIENT
Start: 2022-11-02 | End: 2022-11-06 | Stop reason: HOSPADM

## 2022-11-02 RX ADMIN — GABAPENTIN 100 MG: 300 CAPSULE ORAL at 17:24

## 2022-11-02 RX ADMIN — SUGAMMADEX 200 MG: 100 INJECTION, SOLUTION INTRAVENOUS at 15:38

## 2022-11-02 RX ADMIN — COLCHICINE 0.6 MG: 0.6 TABLET, FILM COATED ORAL at 08:53

## 2022-11-02 RX ADMIN — ACETAMINOPHEN 650 MG: 325 TABLET ORAL at 17:24

## 2022-11-02 RX ADMIN — SODIUM CHLORIDE, PRESERVATIVE FREE 10 ML: 5 INJECTION INTRAVENOUS at 17:26

## 2022-11-02 RX ADMIN — DEXAMETHASONE SODIUM PHOSPHATE 10 MG: 4 INJECTION, SOLUTION INTRAMUSCULAR; INTRAVENOUS at 20:54

## 2022-11-02 RX ADMIN — SODIUM CHLORIDE, PRESERVATIVE FREE 10 ML: 5 INJECTION INTRAVENOUS at 06:56

## 2022-11-02 RX ADMIN — SODIUM CHLORIDE, PRESERVATIVE FREE 10 ML: 5 INJECTION INTRAVENOUS at 21:28

## 2022-11-02 RX ADMIN — POLYETHYLENE GLYCOL 3350 17 G: 17 POWDER, FOR SOLUTION ORAL at 08:48

## 2022-11-02 RX ADMIN — SODIUM CHLORIDE: 9 INJECTION, SOLUTION INTRAVENOUS at 14:23

## 2022-11-02 RX ADMIN — ALLOPURINOL 100 MG: 100 TABLET ORAL at 08:48

## 2022-11-02 RX ADMIN — ROCURONIUM BROMIDE 30 MG: 10 INJECTION INTRAVENOUS at 14:30

## 2022-11-02 RX ADMIN — Medication 80 MCG: at 14:28

## 2022-11-02 RX ADMIN — OXYCODONE 15 MG: 5 TABLET ORAL at 11:40

## 2022-11-02 RX ADMIN — ENOXAPARIN SODIUM 30 MG: 100 INJECTION SUBCUTANEOUS at 08:48

## 2022-11-02 RX ADMIN — OXYCODONE 15 MG: 5 TABLET ORAL at 06:30

## 2022-11-02 RX ADMIN — PROPOFOL 110 MG: 10 INJECTION, EMULSION INTRAVENOUS at 14:23

## 2022-11-02 RX ADMIN — CARVEDILOL 3.12 MG: 3.12 TABLET, FILM COATED ORAL at 08:47

## 2022-11-02 RX ADMIN — ONDANSETRON HYDROCHLORIDE 4 MG: 2 INJECTION, SOLUTION INTRAMUSCULAR; INTRAVENOUS at 15:25

## 2022-11-02 RX ADMIN — LIDOCAINE HYDROCHLORIDE 100 MG: 20 INJECTION, SOLUTION INTRAVENOUS at 14:22

## 2022-11-02 RX ADMIN — CASTOR OIL AND BALSAM, PERU: 788; 87 OINTMENT TOPICAL at 08:51

## 2022-11-02 RX ADMIN — GABAPENTIN 100 MG: 300 CAPSULE ORAL at 08:47

## 2022-11-02 RX ADMIN — SUCCINYLCHOLINE CHLORIDE 200 MG: 20 INJECTION, SOLUTION INTRAMUSCULAR; INTRAVENOUS at 14:23

## 2022-11-02 RX ADMIN — BISACODYL 10 MG: 10 SUPPOSITORY RECTAL at 08:48

## 2022-11-02 RX ADMIN — FENTANYL CITRATE 50 MCG: 50 INJECTION, SOLUTION INTRAMUSCULAR; INTRAVENOUS at 15:25

## 2022-11-02 RX ADMIN — FENTANYL CITRATE 50 MCG: 50 INJECTION, SOLUTION INTRAMUSCULAR; INTRAVENOUS at 14:20

## 2022-11-02 RX ADMIN — DOXAZOSIN MESYLATE 2 MG: 2 TABLET ORAL at 08:47

## 2022-11-02 RX ADMIN — SODIUM CHLORIDE, PRESERVATIVE FREE 10 ML: 5 INJECTION INTRAVENOUS at 03:45

## 2022-11-02 RX ADMIN — LEVOTHYROXINE SODIUM 125 MCG: 0.12 TABLET ORAL at 08:47

## 2022-11-02 RX ADMIN — SODIUM CHLORIDE, PRESERVATIVE FREE 10 ML: 5 INJECTION INTRAVENOUS at 00:37

## 2022-11-02 RX ADMIN — Medication 40 MCG: at 14:21

## 2022-11-02 RX ADMIN — SODIUM CHLORIDE: 9 INJECTION, SOLUTION INTRAVENOUS at 14:37

## 2022-11-02 RX ADMIN — OXYCODONE 15 MG: 5 TABLET ORAL at 00:34

## 2022-11-02 RX ADMIN — TAMSULOSIN HYDROCHLORIDE 0.4 MG: 0.4 CAPSULE ORAL at 08:47

## 2022-11-02 RX ADMIN — Medication 40 MCG: at 14:25

## 2022-11-02 RX ADMIN — ACETAMINOPHEN 650 MG: 325 TABLET ORAL at 00:34

## 2022-11-02 RX ADMIN — GABAPENTIN 100 MG: 300 CAPSULE ORAL at 21:59

## 2022-11-02 RX ADMIN — IPRATROPIUM BROMIDE AND ALBUTEROL SULFATE 3 ML: .5; 3 SOLUTION RESPIRATORY (INHALATION) at 16:25

## 2022-11-02 RX ADMIN — CASTOR OIL AND BALSAM, PERU: 788; 87 OINTMENT TOPICAL at 21:59

## 2022-11-02 RX ADMIN — SENNOSIDES 17.2 MG: 8.6 TABLET, FILM COATED ORAL at 08:47

## 2022-11-02 RX ADMIN — ACETAMINOPHEN 650 MG: 325 TABLET ORAL at 06:30

## 2022-11-02 RX ADMIN — METHADONE HYDROCHLORIDE 60 MG: 10 CONCENTRATE ORAL at 08:48

## 2022-11-02 RX ADMIN — OXYCODONE 15 MG: 5 TABLET ORAL at 03:39

## 2022-11-02 RX ADMIN — OXYCODONE 15 MG: 5 TABLET ORAL at 17:24

## 2022-11-02 RX ADMIN — SENNOSIDES 17.2 MG: 8.6 TABLET, FILM COATED ORAL at 17:24

## 2022-11-02 RX ADMIN — ACETAMINOPHEN 650 MG: 325 TABLET ORAL at 11:33

## 2022-11-02 RX ADMIN — CARVEDILOL 3.12 MG: 3.12 TABLET, FILM COATED ORAL at 17:24

## 2022-11-02 RX ADMIN — PANTOPRAZOLE SODIUM 40 MG: 40 TABLET, DELAYED RELEASE ORAL at 08:47

## 2022-11-02 RX ADMIN — Medication 80 MCG: at 14:38

## 2022-11-02 RX ADMIN — OXYCODONE 15 MG: 5 TABLET ORAL at 08:47

## 2022-11-02 RX ADMIN — LACTULOSE 45 ML: 20 SOLUTION ORAL at 08:48

## 2022-11-02 RX ADMIN — OXYCODONE 15 MG: 5 TABLET ORAL at 20:55

## 2022-11-02 RX ADMIN — OXYCODONE 15 MG: 5 TABLET ORAL at 03:40

## 2022-11-02 NOTE — PROGRESS NOTES
Hospitalist Progress Note    NAME: Briana Teresa   :  1965   MRN:  [id number]       Assessment / Plan:  T12 inferior vertebral endplate compression fracture  Acute low back pain due to above    -CT abdomen shows possibility of T12 inferior vertebral endplate compression fracture. -MRI shows evidence of acute T12 fracture. Kyphoplasty performed 10/27/22  - no saddle anesthesia, no numbness, no focal weakness, no issues with urination  - still with persistent back pain. Getting repeat MRI today with anesthesia  -Pain control with fentanyl patch, methadone 60 mg daily, oxycodone PRN  Will need SNF on discharge  Bowel regimen, increased 22 with bisacodyl     Carbapenem resistant urinary tract infection  History of recent Gross catheter and also straight cath  -Urine culture grew Carbapenem resistant Klebsiella pneumonia  -Based on urine culture and sensitivity, currently on gentamicin  - consulted ID 10/26/22, continued Gentamicin through 10/28/22, course completed     Suspected aspiration pneumonia  Acute hypoxic respiratory failure  -MRI shows evidence of aspiration pneumonia. Chest x-ray also shows basilar infiltrates with possible effusion  -He is also reporting some worsening of shortness of breath along with cough and small amount of phlegm  -started empiric antibiotics with cefepime and Flagyl, started 10/25/22  - changed to Cefdinir flagyl, EOT 10/31/22, 7 day course  -Aspiration precautions.  -We will change to dysphagia diet. We will consult speech therapy, appreciate assistance  -Has leukocytosis of 14,000 but he was recently on steroids which may be contributing.   Procal normal  -Continue oxygen nasal cannula, Wean as tolerated  - CXR 10/28/22 showed L hemithorax opacity possible LLL collapse, L pleural effusion  - incentive spirometery  - consulted Pulmonology for persistent O2 requirement, added bronchodilators and mucolytics  - followup CT in 1 year for 3mm pulm nodule     Possible gout flare - improved  Describes knee pain, similar to previous gout flare  - started colchicine 1.2 x1, 0.6 x1, then 0.6 daily  - started allopurinol 100 daily. Was prescribed 300 daily but not taking consistently, so essentially starting over     Chronic pain on methadone  -Continue home methadone. Rest of pain meds as above    Debility secondary to CVA and recent left AKA  -Continue PT OT     Chronic wounds  -Continue wound care     Mild COPD exacerbation  Prn duonebs  S/p prednisone    Leukocytosis likely due to steroids - improved  - He is afebrile  - monitor     GERD  Protonix daily     Essential hypertension  Continue PTA Coreg     Hypothyroidism  Continue PTA Synthroid     Incidental findings requiring outpatient follow up/ evaluation:  3 mm right lung nodule. -Guidelines recommend repeat scan in 12 months  Increase in size of intermediate density left renal lesion. Recommend  follow-up with ultrasound in 6 months. Left AKA     30.0 - 39.9 Obese / Body mass index is 30.79 kg/m². Estimated discharge date: 11/3/22  Barriers: pain control, improvement in resp status, MRIs     Code status: Full  Prophylaxis: Lovenox  Recommended Disposition: SNF  BARBARA: 11/4  Barriers: Continues to have severe back pain     Subjective:     Chief Complaint / Reason for Physician Visit  Follow up for vertebral fracture  HE continues to have mid back pain  NO focal weakness, able to move RLE, has bka on Left    Review of Systems:  Symptom Y/N Comments  Symptom Y/N Comments   Fever/Chills n   Chest Pain n    Poor Appetite n   Edema n    Cough    Abdominal Pain     Sputum    Joint Pain y Back pain   SOB/BECK    Pruritis/Rash     Nausea/vomit    Tolerating PT/OT     Diarrhea    Tolerating Diet     Constipation    Other       Could NOT obtain due to:      Objective:     VITALS:   Last 24hrs VS reviewed since prior progress note.  Most recent are:  Patient Vitals for the past 24 hrs:   Temp Pulse Resp BP SpO2   11/02/22 0342 98.5 °F (36.9 °C) 73 17 101/62 92 %   11/01/22 2243 98.3 °F (36.8 °C) 84 18 101/63 92 %       Intake/Output Summary (Last 24 hours) at 11/2/2022 1622  Last data filed at 11/2/2022 1550  Gross per 24 hour   Intake 700 ml   Output 1300 ml   Net -600 ml        I had a face to face encounter and independently examined this patient on 11/2/2022, as outlined below:  PHYSICAL EXAM:  General: Awake, No acute distress  EENT:  EOMI. Anicteric sclerae. MMM  Resp:  CTA bilaterally, no wheezing or rales. No accessory muscle use  CV:  Regular  rhythm,  No edema  GI:  Soft, Non distended, Non tender. +Bowel sounds  Neurologic:  Alert and oriented X 3, normal speech,   Psych:   Not anxious nor agitated  Skin:  No rashes. No jaundice    Reviewed most current lab test results and cultures  YES  Reviewed most current radiology test results   YES  Review and summation of old records today    NO  Reviewed patient's current orders and MAR    YES  PMH/ reviewed - no change compared to H&P  ________________________________________________________________________  Care Plan discussed with:    Comments   Patient y    Family      RN y    Care Manager     Consultant                       y Multidiciplinary team rounds were held today with , nursing, pharmacist and clinical coordinator. Patient's plan of care was discussed; medications were reviewed and discharge planning was addressed. ________________________________________________________________________  Total NON critical care TIME:  36   Minutes    Total CRITICAL CARE TIME Spent:   Minutes non procedure based      Comments   >50% of visit spent in counseling and coordination of care     ________________________________________________________________________  Sandhya Heath MD     Procedures: see electronic medical records for all procedures/Xrays and details which were not copied into this note but were reviewed prior to creation of Plan.       LABS:  I reviewed today's most current labs and imaging studies.   Pertinent labs include:  Recent Labs     11/02/22  0500   WBC 10.1   HGB 9.3*   HCT 30.8*   *     Recent Labs     11/02/22  0500      K 3.5   CL 98   CO2 30   *   BUN 6   CREA 0.86   CA 9.1   MG 2.0   PHOS 5.8*       Signed: Nidia Gallardo MD

## 2022-11-02 NOTE — PROGRESS NOTES
Patient transported to IR via stretcher on NRB with Anesthesiology. Handoff given to PACU RN using SBAR format.

## 2022-11-02 NOTE — PERIOP NOTES
6746-Handoff Report from Operating Room to PACU    Report received from MRI nurse and MARY Gilbert CRNA regarding Laura Mccray. * No surgeons listed *  And * No procedures listed *  confirmed   with allergies discussed. Anesthesia type, drugs, patient history, complications, estimated blood loss, vital signs, intake and output, and last pain medication, lines, and temperature were reviewed. 8330-TRANSFER - OUT REPORT:    Verbal report given to Ana Peña RN(name) on Laura Mccray  being transferred to [facility](unit) for routine post - op       Report consisted of patients Situation, Background, Assessment and   Recommendations(SBAR). Information from the following report(s) SBAR, Kardex, OR Summary, Procedure Summary, Intake/Output, MAR, and Recent Results was reviewed with the receiving nurse. Opportunity for questions and clarification was provided.       Patient transported with:   O2 @ 4 liters  Tech

## 2022-11-02 NOTE — WOUND CARE
Wound Care brief encounter without patient exam today (will see him tomorrow):  Nursing took a photo of the buttocks wound. The wound has been complicated by the heavy amount of diarrhea he has had due to the use of Lactulose. He has now stopped this and his wound care has been changed for the buttocks to be more moist wound care using the Therahoney wound gel. Plan: Will reassess the buttocks and the right leg tomorrow. Keep floating the heel.    Adrian Hoyos RN, BSN, [employer]

## 2022-11-02 NOTE — PROGRESS NOTES
Discussed with Dr. Jill Sandoval by phone. New MRI for back pain. Moving right leg without any weakness,  BKA on left. MRI shows extravasation of cement from kyphoplasty on left in mid thoracic spine. Cement in the epidural space. Mild cord compression. Given that he is not weak, would watch this conservatively. Would not undergo surgery for this.

## 2022-11-02 NOTE — ANESTHESIA POSTPROCEDURE EVALUATION
* No procedures listed *.    general    Anesthesia Post Evaluation      Multimodal analgesia: multimodal analgesia used between 6 hours prior to anesthesia start to PACU discharge  Patient location during evaluation: PACU  Patient participation: complete - patient participated  Level of consciousness: sleepy but conscious  Pain management: adequate  Airway patency: patent  Anesthetic complications: no  Cardiovascular status: acceptable, blood pressure returned to baseline and hemodynamically stable  Respiratory status: acceptable and nasal cannula  Hydration status: acceptable  Post anesthesia nausea and vomiting:  none  Final Post Anesthesia Temperature Assessment:  Normothermia (36.0-37.5 degrees C)      INITIAL Post-op Vital signs:   Vitals Value Taken Time   BP 99/64 11/02/22 1645   Temp 36.7 °C (98.1 °F) 11/02/22 1645   Pulse 89 11/02/22 1649   Resp 10 11/02/22 1649   SpO2 90 % 11/02/22 1649   Vitals shown include unvalidated device data.

## 2022-11-02 NOTE — PROGRESS NOTES
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 O2 challenge before discharge. If weaned off oxygen during the day please get overnight oximetry study on room air  Patient hoping to get help from case management establishing care with a PCP. Mobilize  Continue bronchodilators and mucolytics  Pulmonary toilet including incentive spirometry and Acapella valve, though the patient will need some improvements in pain control to help him improve his cough  Antibiotics. Tobacco cessation counseling  Will need a follow up CT in 1 year due to his 3 mm pulmonary nodule (and as routine screening for tobacco users)  Flu shot before discharge  Other issues per primary team  Nothing to add patient will come to see us in the office but will need medical transportation. 2022: Patient getting ready for MRI. No shortness of breath. Reports that medical transportation did not strap and then last time he got a ride and that is when he hurt his back. Patient now living alone. Less cough less congestion. Still on oxygen. 22: Chest very tender. Told pt that mucous not the cause. Neuropathic?  No rash to suggest shingles but pt advised to eventually get Shingrix. Pt wants flushot. Notes and labs noted. On examination decreased movement on left chest.  No audible wheeze or rhonchi. Weak cough poor attempt. Complains of chest and back pain with deep inspiratory effort. 10/31/2022: Events of weekend reviewed. Notes and labs noted. Discussed management with Dr. Cris Rey. Chest x-ray from today reviewed. Increased aeration left lung but still with volume loss and increase opacification. Right lung fairly well expanded. On examination decreased movement on left chest.  No audible wheeze or rhonchi. Weak cough poor attempt. Complains of chest and back pain with deep inspiratory effort. Subjective: This patient has been seen and evaluated at the request of Dr. Nanci Zuleta for persistent O2 requirement. Patient is a 61 y.o. 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 He had severe back pain at that time and was found to have a T12 inferior vertebral endplate compression fracture. He has had a prolonged hospital stay and had a kyphoplasty of T5 and T6 performed yesterday. During his hospitalization he has had an oxygen requirement which prompted consult to me today. He was treated for COPD exacerbation on admission with 5 days of prednisone, but he has only had 1 albuterol treatment in the last 10 days. He cannot cough because he is limited by pain. He reports that he has chronic issues with aspiration because of his stroke.       Past Medical History:   Diagnosis Date    Aneurysm ([facility] [address]     (with stroke) brain    Bladder tumor     Cancer ([facility] [address]     bladder CA Chronic pain     Family history of bladder cancer     GERD (gastroesophageal reflux disease)     Gout     History of vascular access device 04/25/2019    [facility] VAT 4 FR MIDLINE R Cephalic Limited access    History of vascular access device 04/26/2019    4 fr Midline right Cephalic midline access    Hypercholesterolemia     Hypertension     Lesion of bladder     Seizures ([address]     Stroke ([facility] [address] 2006    left sided weakness    Thromboembolus ([facility] [address]     left leg    Thyroid disease     TIA (transient ischemic attack) 2012      Past Surgical History:   Procedure Laterality Date    HX ORTHOPAEDIC      R arthroscopy    HX OTHER SURGICAL      x2 L foot    HX UROLOGICAL  04/20/2018    Cystoscopy, TURBT (greater than 5 cm resected) Dr. Savage Bah, [facility]. IR KYPHOPLASTY THORACIC  10/27/2022    KNEE ARTHROSCP HARV      left knee    TRANSURETHRAL RESEC BLADDER NECK  08/10/2018      Prior to Admission medications    Medication Sig Start Date End Date Taking? Authorizing Provider   carvediloL (COREG) 6.25 mg tablet Take 6.25 mg by mouth two (2) times daily (with meals). Yes Provider, Historical   doxazosin (CARDURA) 2 mg tablet Take 2 mg by mouth daily. Yes Provider, Historical   pantoprazole (PROTONIX) 40 mg tablet Take 40 mg by mouth daily. Yes Provider, Historical   metFORMIN ER (GLUCOPHAGE XR) 500 mg tablet Take 500 mg by mouth daily. 9/27/22  Yes Provider, Historical   predniSONE (DELTASONE) 20 mg tablet Take 20 mg by mouth daily. 9/27/22  Yes Provider, Historical   tamsulosin (FLOMAX) 0.4 mg capsule Take 1 capsule by mouth daily after dinner 3/11/22  Yes Joby Hart MD   levothyroxine (SYNTHROID) 125 mcg tablet Take 125 mcg by mouth Daily (before breakfast). Yes Provider, Historical   ibuprofen (MOTRIN) 200 mg tablet Take 400 mg by mouth every six (6) hours as needed for Pain. Yes Provider, Historical   gabapentin (NEURONTIN) 100 mg capsule Take 200 mg by mouth three (3) times daily.    Yes Provider, Historical methadone (DOLOPHINE) 10 mg/mL solution Take 60 mg by mouth daily. Indications: excessive pain   Yes Provider, Historical   allopurinoL (ZYLOPRIM) 300 mg tablet Take 1 Tablet by mouth. Provider, Historical   colchicine 0.6 mg tablet Take 0.6 mg by mouth daily as needed for Gout or Pain. Indications: acute inflammation of the joints due to gout attack    Provider, Historical     Allergies   Allergen Reactions    Sulfamethoxazole-Trimethoprim Rash     L eye and L hand    Ciprofloxacin Hives     Per pcp records    Codeine Hives     Tolerates dilaudid, oxycodone    Cymbalta [Duloxetine] Other (comments)     Confusion and memory loss    Lyrica [Pregabalin] Hives    Sulfa (Sulfonamide Antibiotics) Rash    Ultram [Tramadol] Hives    Vancomycin Shortness of Breath    Zosyn [Piperacillin-Tazobactam] Rash      Social History     Tobacco Use    Smoking status: Every Day     Packs/day: 0.50     Types: Cigarettes    Smokeless tobacco: Never    Tobacco comments:     instructed not to smoke 24 hrs prior surgery   Substance Use Topics    Alcohol use:  Yes     Alcohol/week: 6.0 standard drinks     Types: 6 Cans of beer per week     Comment: occasional      Family History   Problem Relation Age of Onset    Diabetes Father     Lung Disease Father     Diabetes Sister     Diabetes Brother     Cancer Paternal Grandfather         Bladder        Current Facility-Administered Medications   Medication Dose Route Frequency    influenza vaccine 2022-23 (6 mos+)(PF) (FLUARIX/FLULAVAL/FLUZONE QUAD) injection 0.5 mL  1 Each IntraMUSCular PRIOR TO DISCHARGE    bisacodyL (DULCOLAX) suppository 10 mg  10 mg Rectal DAILY    lactulose (CHRONULAC) 10 gram/15 mL solution 45 mL  30 g Oral TID    polyethylene glycol (MIRALAX) packet 17 g  17 g Oral BID    acetaminophen (TYLENOL) tablet 650 mg  650 mg Oral Q6H    senna (SENOKOT) tablet 17.2 mg  2 Tablet Oral BID    allopurinoL (ZYLOPRIM) tablet 100 mg  100 mg Oral DAILY    colchicine tablet 0.6 mg  0.6 mg Oral DAILY    sodium chloride (NS) flush 5-40 mL  5-40 mL IntraVENous Q8H    balsam peru-castor oiL (VENELEX) ointment   Topical BID    carvediloL (COREG) tablet 3.125 mg  3.125 mg Oral BID WITH MEALS    doxazosin (CARDURA) tablet 2 mg  2 mg Oral DAILY    pantoprazole (PROTONIX) tablet 40 mg  40 mg Oral ACB    enoxaparin (LOVENOX) injection 30 mg  30 mg SubCUTAneous Q12H    fentaNYL (DURAGESIC) 12 mcg/hr patch 1 Patch  1 Patch TransDERmal Q72H    gabapentin (NEURONTIN) capsule 100 mg  100 mg Oral TID    levothyroxine (SYNTHROID) tablet 125 mcg  125 mcg Oral ACB    methadone (DOLOPHINE) 10 mg/mL concentrated solution 60 mg  60 mg Oral DAILY    tamsulosin (FLOMAX) capsule 0.4 mg  0.4 mg Oral DAILY    sodium chloride (NS) flush 5-40 mL  5-40 mL IntraVENous Q8H    lidocaine 4 % patch 2 Patch  2 Patch TransDERmal Q24H       Review of Systems:  A comprehensive review of systems was negative except for that written in the HPI. Objective:   Vital Signs:    Visit Vitals  /62   Pulse 73   Temp 98.5 °F (36.9 °C)   Resp 17   Ht 6' (1.829 m)   Wt 103 kg (227 lb)   SpO2 92%   BMI 30.79 kg/m²       O2 Device: Nasal cannula   O2 Flow Rate (L/min): 2 l/min   Temp (24hrs), Av.3 °F (36.8 °C), Min:98.2 °F (36.8 °C), Max:98.5 °F (36.9 °C)       Intake/Output:   Last shift:      701 - 1900  In: -   Out: 300 [Urine:300]  Last 3 shifts: 10/31 1901 -  07  In: 300 [P.O.:300]  Out: 1000 [Urine:1000]    Intake/Output Summary (Last 24 hours) at 2022 1233  Last data filed at 2022 0902  Gross per 24 hour   Intake --   Output 1300 ml   Net -1300 ml        Physical Exam:   General:  Alert, cooperative, no distress    Head:  Normocephalic, without obvious abnormality, atraumatic. Eyes:  Conjunctivae/corneas clear. Nose: Nares normal. Septum midline.  Mucosa normal.    Throat: Lips, mucosa, and tongue normal.     Neck: Supple, symmetrical, trachea midline    Back:   Unable to examine due to pain with movement   Lungs:   Near-absent breath sounds on left    Chest wall:  No tenderness or deformity. Heart:  Regular rate and rhythm    Abdomen:   Soft, non-tender. Bowel sounds normal.     Extremities: Extremities normal, atraumatic, no cyanosis, left AKA   Skin: Skin color, texture, turgor normal. No rashes or lesions   Lymph nodes: Cervical, supraclavicular nodes normal.   Neurologic: Grossly nonfocal     Data review:     Recent Results (from the past 24 hour(s))   METABOLIC PANEL, BASIC    Collection Time: 11/02/22  5:00 AM   Result Value Ref Range    Sodium 138 136 - 145 mmol/L    Potassium 3.5 3.5 - 5.1 mmol/L    Chloride 98 97 - 108 mmol/L    CO2 30 21 - 32 mmol/L    Anion gap 10 5 - 15 mmol/L    Glucose 105 (H) 65 - 100 mg/dL    BUN 6 6 - 20 MG/DL    Creatinine 0.86 0.70 - 1.30 MG/DL    BUN/Creatinine ratio 7 (L) 12 - 20      eGFR >60 >60 ml/min/1.73m2    Calcium 9.1 8.5 - 10.1 MG/DL   CBC WITH AUTOMATED DIFF    Collection Time: 11/02/22  5:00 AM   Result Value Ref Range    WBC 10.1 4.1 - 11.1 K/uL    RBC 3.86 (L) 4.10 - 5.70 M/uL    HGB 9.3 (L) 12.1 - 17.0 g/dL    HCT 30.8 (L) 36.6 - 50.3 %    MCV 79.8 (L) 80.0 - 99.0 FL    MCH 24.1 (L) 26.0 - 34.0 PG    MCHC 30.2 30.0 - 36.5 g/dL    RDW 16.2 (H) 11.5 - 14.5 %    PLATELET 931 (H) 744 - 400 K/uL    MPV 9.2 8.9 - 12.9 FL    NRBC 0.0 0  WBC    ABSOLUTE NRBC 0.00 0.00 - 0.01 K/uL    NEUTROPHILS 58 32 - 75 %    LYMPHOCYTES 21 12 - 49 %    MONOCYTES 11 5 - 13 %    EOSINOPHILS 9 (H) 0 - 7 %    BASOPHILS 0 0 - 1 %    IMMATURE GRANULOCYTES 1 (H) 0.0 - 0.5 %    ABS. NEUTROPHILS 5.8 1.8 - 8.0 K/UL    ABS. LYMPHOCYTES 2.1 0.8 - 3.5 K/UL    ABS. MONOCYTES 1.1 (H) 0.0 - 1.0 K/UL    ABS. EOSINOPHILS 0.9 (H) 0.0 - 0.4 K/UL    ABS. BASOPHILS 0.0 0.0 - 0.1 K/UL    ABS. IMM.  GRANS. 0.1 (H) 0.00 - 0.04 K/UL    DF AUTOMATED     PHOSPHORUS    Collection Time: 11/02/22  5:00 AM   Result Value Ref Range    Phosphorus 5.8 (H) 2.6 - 4.7 MG/DL   MAGNESIUM    Collection Time: 11/02/22  5:00 AM   Result Value Ref Range    Magnesium 2.0 1.6 - 2.4 mg/dL       Imaging:  I have personally reviewed the patients radiographs and have reviewed the reports:  Near complete opacification of the left lung with tracheal deviation to the left, consistent with atelectasis mucous plugging        Graciela Huertas MD

## 2022-11-03 LAB
ANION GAP SERPL CALC-SCNC: 7 MMOL/L (ref 5–15)
BASOPHILS # BLD: 0 K/UL (ref 0–0.1)
BASOPHILS NFR BLD: 0 % (ref 0–1)
BUN SERPL-MCNC: 6 MG/DL (ref 6–20)
BUN/CREAT SERPL: 6 (ref 12–20)
CALCIUM SERPL-MCNC: 9 MG/DL (ref 8.5–10.1)
CHLORIDE SERPL-SCNC: 99 MMOL/L (ref 97–108)
CO2 SERPL-SCNC: 29 MMOL/L (ref 21–32)
CREAT SERPL-MCNC: 0.96 MG/DL (ref 0.7–1.3)
DIFFERENTIAL METHOD BLD: ABNORMAL
EOSINOPHIL # BLD: 0 K/UL (ref 0–0.4)
EOSINOPHIL NFR BLD: 0 % (ref 0–7)
ERYTHROCYTE [DISTWIDTH] IN BLOOD BY AUTOMATED COUNT: 15.8 % (ref 11.5–14.5)
GLUCOSE SERPL-MCNC: 239 MG/DL (ref 65–100)
HCT VFR BLD AUTO: 29.9 % (ref 36.6–50.3)
HGB BLD-MCNC: 9.2 G/DL (ref 12.1–17)
IMM GRANULOCYTES # BLD AUTO: 0.1 K/UL (ref 0–0.04)
IMM GRANULOCYTES NFR BLD AUTO: 1 % (ref 0–0.5)
LYMPHOCYTES # BLD: 1.1 K/UL (ref 0.8–3.5)
LYMPHOCYTES NFR BLD: 14 % (ref 12–49)
MAGNESIUM SERPL-MCNC: 2 MG/DL (ref 1.6–2.4)
MCH RBC QN AUTO: 24.3 PG (ref 26–34)
MCHC RBC AUTO-ENTMCNC: 30.8 G/DL (ref 30–36.5)
MCV RBC AUTO: 79.1 FL (ref 80–99)
MONOCYTES # BLD: 0.1 K/UL (ref 0–1)
MONOCYTES NFR BLD: 1 % (ref 5–13)
NEUTS SEG # BLD: 6.7 K/UL (ref 1.8–8)
NEUTS SEG NFR BLD: 84 % (ref 32–75)
NRBC # BLD: 0 K/UL (ref 0–0.01)
NRBC BLD-RTO: 0 PER 100 WBC
PHOSPHATE SERPL-MCNC: 5.2 MG/DL (ref 2.6–4.7)
PLATELET # BLD AUTO: 528 K/UL (ref 150–400)
PMV BLD AUTO: 9.1 FL (ref 8.9–12.9)
POTASSIUM SERPL-SCNC: 4.9 MMOL/L (ref 3.5–5.1)
RBC # BLD AUTO: 3.78 M/UL (ref 4.1–5.7)
SODIUM SERPL-SCNC: 135 MMOL/L (ref 136–145)
WBC # BLD AUTO: 8 K/UL (ref 4.1–11.1)

## 2022-11-03 PROCEDURE — 74011250637 HC RX REV CODE- 250/637: Performed by: STUDENT IN AN ORGANIZED HEALTH CARE EDUCATION/TRAINING PROGRAM

## 2022-11-03 PROCEDURE — 65270000046 HC RM TELEMETRY

## 2022-11-03 PROCEDURE — 74011000250 HC RX REV CODE- 250: Performed by: STUDENT IN AN ORGANIZED HEALTH CARE EDUCATION/TRAINING PROGRAM

## 2022-11-03 PROCEDURE — 36415 COLL VENOUS BLD VENIPUNCTURE: CPT

## 2022-11-03 PROCEDURE — 77010033678 HC OXYGEN DAILY

## 2022-11-03 PROCEDURE — 85025 COMPLETE CBC W/AUTO DIFF WBC: CPT

## 2022-11-03 PROCEDURE — 80048 BASIC METABOLIC PNL TOTAL CA: CPT

## 2022-11-03 PROCEDURE — 83735 ASSAY OF MAGNESIUM: CPT

## 2022-11-03 PROCEDURE — 74011250636 HC RX REV CODE- 250/636: Performed by: STUDENT IN AN ORGANIZED HEALTH CARE EDUCATION/TRAINING PROGRAM

## 2022-11-03 PROCEDURE — 94760 N-INVAS EAR/PLS OXIMETRY 1: CPT

## 2022-11-03 PROCEDURE — 74011250637 HC RX REV CODE- 250/637: Performed by: INTERNAL MEDICINE

## 2022-11-03 PROCEDURE — 84100 ASSAY OF PHOSPHORUS: CPT

## 2022-11-03 RX ORDER — HYDROMORPHONE HYDROCHLORIDE 1 MG/ML
0.5 INJECTION, SOLUTION INTRAMUSCULAR; INTRAVENOUS; SUBCUTANEOUS
Status: DISCONTINUED | OUTPATIENT
Start: 2022-11-03 | End: 2022-11-03

## 2022-11-03 RX ORDER — HYDROMORPHONE HYDROCHLORIDE 1 MG/ML
1 INJECTION, SOLUTION INTRAMUSCULAR; INTRAVENOUS; SUBCUTANEOUS
Status: DISCONTINUED | OUTPATIENT
Start: 2022-11-03 | End: 2022-11-07

## 2022-11-03 RX ORDER — DEXAMETHASONE 4 MG/1
4 TABLET ORAL EVERY 6 HOURS
Status: DISCONTINUED | OUTPATIENT
Start: 2022-11-03 | End: 2022-11-07

## 2022-11-03 RX ORDER — TIZANIDINE 2 MG/1
2 TABLET ORAL 3 TIMES DAILY
Status: DISCONTINUED | OUTPATIENT
Start: 2022-11-03 | End: 2022-11-20 | Stop reason: HOSPADM

## 2022-11-03 RX ORDER — DEXAMETHASONE SODIUM PHOSPHATE 10 MG/ML
6 INJECTION INTRAMUSCULAR; INTRAVENOUS EVERY 6 HOURS
Status: DISCONTINUED | OUTPATIENT
Start: 2022-11-03 | End: 2022-11-03

## 2022-11-03 RX ADMIN — HYDROMORPHONE HYDROCHLORIDE 1 MG: 1 INJECTION, SOLUTION INTRAMUSCULAR; INTRAVENOUS; SUBCUTANEOUS at 21:57

## 2022-11-03 RX ADMIN — SODIUM CHLORIDE, PRESERVATIVE FREE 10 ML: 5 INJECTION INTRAVENOUS at 06:34

## 2022-11-03 RX ADMIN — OXYCODONE 15 MG: 5 TABLET ORAL at 00:14

## 2022-11-03 RX ADMIN — SENNOSIDES 17.2 MG: 8.6 TABLET, FILM COATED ORAL at 08:25

## 2022-11-03 RX ADMIN — ACETAMINOPHEN 650 MG: 325 TABLET ORAL at 11:33

## 2022-11-03 RX ADMIN — METHADONE HYDROCHLORIDE 60 MG: 10 CONCENTRATE ORAL at 08:26

## 2022-11-03 RX ADMIN — OXYCODONE 15 MG: 5 TABLET ORAL at 05:33

## 2022-11-03 RX ADMIN — GABAPENTIN 100 MG: 300 CAPSULE ORAL at 21:46

## 2022-11-03 RX ADMIN — SODIUM CHLORIDE, PRESERVATIVE FREE 10 ML: 5 INJECTION INTRAVENOUS at 15:12

## 2022-11-03 RX ADMIN — LEVOTHYROXINE SODIUM 125 MCG: 0.12 TABLET ORAL at 06:35

## 2022-11-03 RX ADMIN — TAMSULOSIN HYDROCHLORIDE 0.4 MG: 0.4 CAPSULE ORAL at 08:26

## 2022-11-03 RX ADMIN — CASTOR OIL AND BALSAM, PERU: 788; 87 OINTMENT TOPICAL at 21:57

## 2022-11-03 RX ADMIN — MELATONIN 3 MG: at 00:14

## 2022-11-03 RX ADMIN — COLCHICINE 0.6 MG: 0.6 TABLET, FILM COATED ORAL at 08:32

## 2022-11-03 RX ADMIN — ACETAMINOPHEN 650 MG: 325 TABLET ORAL at 00:14

## 2022-11-03 RX ADMIN — OXYCODONE 15 MG: 5 TABLET ORAL at 08:26

## 2022-11-03 RX ADMIN — CASTOR OIL AND BALSAM, PERU: 788; 87 OINTMENT TOPICAL at 08:25

## 2022-11-03 RX ADMIN — TIZANIDINE 2 MG: 2 TABLET ORAL at 16:39

## 2022-11-03 RX ADMIN — DOXAZOSIN MESYLATE 2 MG: 2 TABLET ORAL at 08:26

## 2022-11-03 RX ADMIN — GABAPENTIN 100 MG: 300 CAPSULE ORAL at 08:26

## 2022-11-03 RX ADMIN — TIZANIDINE 2 MG: 2 TABLET ORAL at 21:46

## 2022-11-03 RX ADMIN — OXYCODONE 15 MG: 5 TABLET ORAL at 16:39

## 2022-11-03 RX ADMIN — DEXAMETHASONE 4 MG: 4 TABLET ORAL at 11:33

## 2022-11-03 RX ADMIN — LACTULOSE 45 ML: 20 SOLUTION ORAL at 08:25

## 2022-11-03 RX ADMIN — ACETAMINOPHEN 650 MG: 325 TABLET ORAL at 17:47

## 2022-11-03 RX ADMIN — ENOXAPARIN SODIUM 30 MG: 100 INJECTION SUBCUTANEOUS at 08:26

## 2022-11-03 RX ADMIN — ACETAMINOPHEN 650 MG: 325 TABLET ORAL at 05:33

## 2022-11-03 RX ADMIN — SODIUM CHLORIDE, PRESERVATIVE FREE 10 ML: 5 INJECTION INTRAVENOUS at 15:11

## 2022-11-03 RX ADMIN — SODIUM CHLORIDE, PRESERVATIVE FREE 10 ML: 5 INJECTION INTRAVENOUS at 21:46

## 2022-11-03 RX ADMIN — CARVEDILOL 3.12 MG: 3.12 TABLET, FILM COATED ORAL at 08:25

## 2022-11-03 RX ADMIN — GABAPENTIN 100 MG: 300 CAPSULE ORAL at 16:39

## 2022-11-03 RX ADMIN — POLYETHYLENE GLYCOL 3350 17 G: 17 POWDER, FOR SOLUTION ORAL at 08:26

## 2022-11-03 RX ADMIN — LACTULOSE 45 ML: 20 SOLUTION ORAL at 16:39

## 2022-11-03 RX ADMIN — PANTOPRAZOLE SODIUM 40 MG: 40 TABLET, DELAYED RELEASE ORAL at 06:35

## 2022-11-03 RX ADMIN — CARVEDILOL 3.12 MG: 3.12 TABLET, FILM COATED ORAL at 16:39

## 2022-11-03 RX ADMIN — OXYCODONE 15 MG: 5 TABLET ORAL at 20:37

## 2022-11-03 RX ADMIN — HYDROMORPHONE HYDROCHLORIDE 1 MG: 1 INJECTION, SOLUTION INTRAMUSCULAR; INTRAVENOUS; SUBCUTANEOUS at 17:50

## 2022-11-03 RX ADMIN — OXYCODONE 15 MG: 5 TABLET ORAL at 11:33

## 2022-11-03 RX ADMIN — ALLOPURINOL 100 MG: 100 TABLET ORAL at 08:26

## 2022-11-03 RX ADMIN — BISACODYL 10 MG: 10 SUPPOSITORY RECTAL at 08:26

## 2022-11-03 RX ADMIN — DEXAMETHASONE 4 MG: 4 TABLET ORAL at 17:47

## 2022-11-03 RX ADMIN — HYDROMORPHONE HYDROCHLORIDE 0.5 MG: 1 INJECTION, SOLUTION INTRAMUSCULAR; INTRAVENOUS; SUBCUTANEOUS at 14:01

## 2022-11-03 NOTE — PROGRESS NOTES
Spoke in length with patient and physician in room at bedside about back pain and pain management plan. Patient in agreement with new regimen at this time and will see how he feels after 24 hours of new medications. Will revisit with MD and patient in the morning at bedside to discuss comfort levels and make adjustments as needed.

## 2022-11-03 NOTE — PROGRESS NOTES
Problem: Patient Education: Go to Patient Education Activity  Goal: Patient/Family Education  Outcome: Progressing Towards Goal     Problem: Patient Education: Go to Patient Education Activity  Goal: Patient/Family Education  Outcome: Progressing Towards Goal     Problem: Pressure Injury - Risk of  Goal: *Prevention of pressure injury  Description: Document Dejuan Scale and appropriate interventions in the flowsheet. Outcome: Progressing Towards Goal  Note: Pressure Injury Interventions:  Sensory Interventions: Assess changes in LOC    Moisture Interventions: Absorbent underpads    Activity Interventions: Chair cushion    Mobility Interventions: PT/OT evaluation    Nutrition Interventions: Document food/fluid/supplement intake    Friction and Shear Interventions: Feet elevated on foot rest                Problem: Patient Education: Go to Patient Education Activity  Goal: Patient/Family Education  Outcome: Progressing Towards Goal     Problem: Falls - Risk of  Goal: *Absence of Falls  Description: Document Jordon Fall Risk and appropriate interventions in the flowsheet.   Outcome: Progressing Towards Goal  Note: Fall Risk Interventions:  Mobility Interventions: Patient to call before getting OOB    Mentation Interventions: Bed/chair exit alarm    Medication Interventions: Bed/chair exit alarm    Elimination Interventions: Call light in reach    History of Falls Interventions: Bed/chair exit alarm         Problem: Patient Education: Go to Patient Education Activity  Goal: Patient/Family Education  Outcome: Progressing Towards Goal     Problem: Pain  Goal: *Control of Pain  Outcome: Progressing Towards Goal     Problem: Patient Education: Go to Patient Education Activity  Goal: Patient/Family Education  Outcome: Progressing Towards Goal     Problem: Patient Education: Go to Patient Education Activity  Goal: Patient/Family Education  Outcome: Progressing Towards Goal

## 2022-11-03 NOTE — PROGRESS NOTES
Comprehensive Nutrition Assessment    Type and Reason for Visit: Reassess    Nutrition Recommendations/Plan:   Continue current diet     Malnutrition Assessment:  Malnutrition Status:  No malnutrition (11/03/22 1507)    Context:  Chronic illness     Findings of the 6 clinical characteristics of malnutrition:   Energy Intake:  No significant decrease in energy intake  Weight Loss:  No significant weight loss     Body Fat Loss:  No significant body fat loss,     Muscle Mass Loss:  No significant muscle mass loss,    Fluid Accumulation:  Mild, Extremities   Strength:  Not performed     Nutrition Assessment:    Chart reviewed, med noted for compression fracture s/p kyphoplasty 10/27. Pt reports good PO intake and appetite, eating most if not all of his meals. No signs of muscle or fat wasting noted upon visual inspection. Visit was brief d/t pt being on the phone, stated it was an important phone call. Patient Vitals for the past 168 hrs:   % Diet Eaten   11/03/22 0843 76 - 100%   11/01/22 0857 76 - 100%   10/29/22 1200 26 - 50%   10/29/22 0858 51 - 75%   10/28/22 1413 76 - 100%   10/27/22 1830 1 - 25%     Nutrition Related Findings:    BM 11/3' Edema: +2BUE, +2RLE; Meds: dulcolax, decadron, chronulac, synthroid, miralax; Labs: Na 135, phos 5.2, -105 Wound Type: Skin tears, Full thickness    Current Nutrition Intake & Therapies:  Average Meal Intake: %  Average Supplement Intake: None ordered  DIET ONE TIME MESSAGE  ADULT DIET Regular; 5 carb choices (75 gm/meal); Low Fat/Low Chol/High Fiber/2 gm Na    Anthropometric Measures:  Height: 6' (182.9 cm)  Ideal Body Weight (IBW): 178 lbs (81 kg)     Current Body Wt:  103 kg (227 lb 1.2 oz), 127.6 % IBW. Not specified  Current BMI (kg/m2): 30.8        Weight Adjustment: No adjustment                 BMI Category: Obese class 1 (BMI 30.0-34. 9)    Estimated Daily Nutrient Needs:  Energy Requirements Based On: Formula  Weight Used for Energy Requirements: Current  Energy (kcal/day): 1972 kcals (BMR 1893 x 1. 2AF -300kcals)  Weight Used for Protein Requirements: Current  Protein (g/day): 81-97g (1-1.2g/kg ibw)  Method Used for Fluid Requirements: 1 ml/kcal  Fluid (ml/day): 2000 ml    Nutrition Diagnosis:   No nutrition diagnosis at this time    Nutrition Interventions:   Food and/or Nutrient Delivery: Continue current diet  Nutrition Education/Counseling: No recommendations at this time  Coordination of Nutrition Care: Continue to monitor while inpatient       Goals:  Previous Goal Met: Goal(s) achieved  Goals: Meet at least 75% of estimated needs, by next RD assessment       Nutrition Monitoring and Evaluation:   Behavioral-Environmental Outcomes: None identified  Food/Nutrient Intake Outcomes: Food and nutrient intake  Physical Signs/Symptoms Outcomes: Weight, Skin, Biochemical data    Discharge Planning:    Continue current diet    Miguel A Salazar

## 2022-11-03 NOTE — WOUND CARE
Wound care attempted to see patient with nurse but at nursing request, Sukhi Raman is finally asleep with pain meds that are working for him\". Plan: Will wait til tomorrow morning to see him. Wound care is written for all of his wounds.    Ric Queen RN, BSN, [facility]

## 2022-11-03 NOTE — PROGRESS NOTES
Hospitalist Progress Note    NAME: Kay Gudino   :  1965   MRN:  [id number]       Assessment / Plan:  T12 inferior vertebral endplate compression fracture  Acute low back pain due to above    -CT abdomen shows possibility of T12 inferior vertebral endplate compression fracture. -MRI shows evidence of acute T12 fracture. Kyphoplasty performed 10/27/22  No saddle anesthesia, no numbness, no focal weakness, no issues with urination  Still with persistent back pain. 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 Talked to neurosurgery Dr. Marilee Avila, doesn't think he needs surgical intervention as he doesn't have significant weakness of the RLE (RLE power 3/5 , has AKA on the left)  Pain control with fentanyl patch, methadone 60 mg daily, oxycodone PRN. Added dilaudid today as pain not controlled  Will need SNF on discharge  Bowel regimen, increased 22 with bisacodyl     Carbapenem resistant urinary tract infection  History of recent Gross catheter and also straight cath  -Urine culture grew Carbapenem resistant Klebsiella pneumonia  -Based on urine culture and sensitivity, currently on gentamicin  - consulted ID 10/26/22, continued Gentamicin through 10/28/22, course completed     Suspected aspiration pneumonia  Acute hypoxic respiratory failure  -MRI shows evidence of aspiration pneumonia. Chest x-ray also shows basilar infiltrates with possible effusion  -He is also reporting some worsening of shortness of breath along with cough and small amount of phlegm  -started empiric antibiotics with cefepime and Flagyl, started 10/25/22  - changed to Cefdinir flagyl, EOT 10/31/22, 7 day course  -Aspiration precautions.  -We will change to dysphagia diet.   We will consult speech therapy, appreciate assistance  -Has leukocytosis of 14,000 but he was recently on steroids which may be contributing. Procal normal  -Continue oxygen nasal cannula, Wean as tolerated  - CXR 10/28/22 showed L hemithorax opacity possible LLL collapse, L pleural effusion  - incentive spirometery  - consulted Pulmonology for persistent O2 requirement, added bronchodilators and mucolytics  - followup CT in 1 year for 3mm pulm nodule     Possible gout flare - improved  Describes knee pain, similar to previous gout flare  - started colchicine 1.2 x1, 0.6 x1, then 0.6 daily  - started allopurinol 100 daily. Was prescribed 300 daily but not taking consistently, so essentially starting over     Chronic pain on methadone  -Continue home methadone. Rest of pain meds as above    Debility secondary to CVA and recent left AKA  -Continue PT OT     Chronic wounds  -Continue wound care     Mild COPD exacerbation  Prn duonebs  S/p prednisone    Leukocytosis likely due to steroids - improved  - He is afebrile  - monitor     GERD  Protonix daily     Essential hypertension  Continue PTA Coreg     Hypothyroidism  Continue PTA Synthroid     Incidental findings requiring outpatient follow up/ evaluation:  3 mm right lung nodule. -Guidelines recommend repeat scan in 12 months  Increase in size of intermediate density left renal lesion. Recommend  follow-up with ultrasound in 6 months. Left AKA     30.0 - 39.9 Obese / Body mass index is 30.79 kg/m².      Code status: Full  Prophylaxis: Lovenox  Recommended Disposition: SNF  BARBARA: 11/5  Barriers: Continues to have severe back pain     Subjective:     Chief Complaint / Reason for Physician Visit  Follow up for vertebral fracture  Continues to have severe back pain  Mentions his weakness on RLE is weaker now    Review of Systems:  Symptom Y/N Comments  Symptom Y/N Comments   Fever/Chills n   Chest Pain n    Poor Appetite n   Edema n    Cough    Abdominal Pain     Sputum    Joint Pain y Back pain   SOB/BECK    Pruritis/Rash     Nausea/vomit    Tolerating PT/OT     Diarrhea    Tolerating Diet Constipation    Other       Could NOT obtain due to:      Objective:     VITALS:   Last 24hrs VS reviewed since prior progress note. Most recent are:  Patient Vitals for the past 24 hrs:   Temp Pulse Resp BP SpO2   11/03/22 1523 98.8 °F (37.1 °C) 74 14 125/66 92 %   11/03/22 1401 -- 83 16 114/70 --   11/03/22 0924 98 °F (36.7 °C) 73 14 121/68 92 %   11/02/22 2325 98 °F (36.7 °C) 73 17 131/74 94 %   11/02/22 1720 98.2 °F (36.8 °C) 95 16 97/66 92 %       Intake/Output Summary (Last 24 hours) at 11/3/2022 1601  Last data filed at 11/3/2022 0843  Gross per 24 hour   Intake 2040 ml   Output 800 ml   Net 1240 ml        I had a face to face encounter and independently examined this patient on 11/3/2022, as outlined below:  PHYSICAL EXAM:  General: Awake, No acute distress  EENT:  EOMI. Anicteric sclerae. MMM  Resp:  CTA bilaterally, no wheezing or rales. No accessory muscle use  CV:  Regular  rhythm,  No edema  GI:  Soft, Non distended, Non tender. +Bowel sounds  Neurologic:  Alert and oriented X 3, normal speech,   Psych:   Not anxious nor agitated  Ext: Power 3/5 on RLE, left AKA (has prior weakness)  Power 5/5 on RUE and has residual weakness on left    Reviewed most current lab test results and cultures  YES  Reviewed most current radiology test results   YES  Review and summation of old records today    NO  Reviewed patient's current orders and MAR    YES  PMH/ reviewed - no change compared to H&P  ________________________________________________________________________  Care Plan discussed with:    Comments   Patient y    Family      RN y    Care Manager     Consultant                       y Multidiciplinary team rounds were held today with , nursing, pharmacist and clinical coordinator. Patient's plan of care was discussed; medications were reviewed and discharge planning was addressed.      ________________________________________________________________________  Total NON critical care TIME:  36 Minutes    Total CRITICAL CARE TIME Spent:   Minutes non procedure based      Comments   >50% of visit spent in counseling and coordination of care     ________________________________________________________________________  Toney Griffin MD     Procedures: see electronic medical records for all procedures/Xrays and details which were not copied into this note but were reviewed prior to creation of Plan. LABS:  I reviewed today's most current labs and imaging studies.   Pertinent labs include:  Recent Labs     11/03/22  1000 11/02/22  0500   WBC 8.0 10.1   HGB 9.2* 9.3*   HCT 29.9* 30.8*   * 546*     Recent Labs     11/03/22  1000 11/02/22  0500   * 138   K 4.9 3.5   CL 99 98   CO2 29 30   * 105*   BUN 6 6   CREA 0.96 0.86   CA 9.0 9.1   MG 2.0 2.0   PHOS 5.2* 5.8*       Signed: Toney Griffin MD

## 2022-11-03 NOTE — PROGRESS NOTES
Was informed of MRI findings, his cement from T5-T6 has moves posteriorly causing mild cord compression and edema. Discussed with neurosurgery. Given no weakness from this, recommend managing conservatively. Can try decadron .

## 2022-11-03 NOTE — PROGRESS NOTES
Neurosurgery  Asked to see patient for worsening right leg weakness. Confusing story from nursing who feels that he has been weak all along. He can lift his right leg off the bed and hold it up for 5 seconds. He ets it down because too much pain\ in the back. Continues with pain in back - severe. Leg pain, numbness. MRI shows extravasation of cement from kyphoplasty on left in mid thoracic spine. Cement in the epidural space. Mild cord compression. There is no compression on the right. I do not think he needs a surgery. Continue to treat conservatively. May try steroids.

## 2022-11-03 NOTE — PROGRESS NOTES
Bedside shift change report given to Lupillo Ordonez RN (oncoming nurse) by Yana Cool RN (offgoing nurse). Report included the following information SBAR, Kardex, Intake/Output.

## 2022-11-04 ENCOUNTER — APPOINTMENT (OUTPATIENT)
Dept: CT IMAGING | Age: 57
DRG: 321 | End: 2022-11-04
Attending: STUDENT IN AN ORGANIZED HEALTH CARE EDUCATION/TRAINING PROGRAM
Payer: MEDICAID

## 2022-11-04 PROCEDURE — 74011250637 HC RX REV CODE- 250/637: Performed by: STUDENT IN AN ORGANIZED HEALTH CARE EDUCATION/TRAINING PROGRAM

## 2022-11-04 PROCEDURE — 77010033678 HC OXYGEN DAILY

## 2022-11-04 PROCEDURE — 74011250637 HC RX REV CODE- 250/637: Performed by: INTERNAL MEDICINE

## 2022-11-04 PROCEDURE — 74011250636 HC RX REV CODE- 250/636: Performed by: STUDENT IN AN ORGANIZED HEALTH CARE EDUCATION/TRAINING PROGRAM

## 2022-11-04 PROCEDURE — 94760 N-INVAS EAR/PLS OXIMETRY 1: CPT

## 2022-11-04 PROCEDURE — 74011000250 HC RX REV CODE- 250: Performed by: STUDENT IN AN ORGANIZED HEALTH CARE EDUCATION/TRAINING PROGRAM

## 2022-11-04 PROCEDURE — 72128 CT CHEST SPINE W/O DYE: CPT

## 2022-11-04 PROCEDURE — 97530 THERAPEUTIC ACTIVITIES: CPT

## 2022-11-04 PROCEDURE — 65270000046 HC RM TELEMETRY

## 2022-11-04 RX ADMIN — HYDROMORPHONE HYDROCHLORIDE 1 MG: 1 INJECTION, SOLUTION INTRAMUSCULAR; INTRAVENOUS; SUBCUTANEOUS at 09:58

## 2022-11-04 RX ADMIN — ACETAMINOPHEN 650 MG: 325 TABLET ORAL at 17:49

## 2022-11-04 RX ADMIN — CASTOR OIL AND BALSAM, PERU: 788; 87 OINTMENT TOPICAL at 20:49

## 2022-11-04 RX ADMIN — SENNOSIDES 17.2 MG: 8.6 TABLET, FILM COATED ORAL at 08:49

## 2022-11-04 RX ADMIN — OXYCODONE 15 MG: 5 TABLET ORAL at 03:34

## 2022-11-04 RX ADMIN — SODIUM CHLORIDE, PRESERVATIVE FREE 10 ML: 5 INJECTION INTRAVENOUS at 21:59

## 2022-11-04 RX ADMIN — COLCHICINE 0.6 MG: 0.6 TABLET, FILM COATED ORAL at 08:53

## 2022-11-04 RX ADMIN — METHADONE HYDROCHLORIDE 60 MG: 10 CONCENTRATE ORAL at 08:49

## 2022-11-04 RX ADMIN — OXYCODONE 15 MG: 5 TABLET ORAL at 08:49

## 2022-11-04 RX ADMIN — DEXAMETHASONE 4 MG: 4 TABLET ORAL at 11:49

## 2022-11-04 RX ADMIN — SODIUM CHLORIDE, PRESERVATIVE FREE 10 ML: 5 INJECTION INTRAVENOUS at 21:58

## 2022-11-04 RX ADMIN — ACETAMINOPHEN 650 MG: 325 TABLET ORAL at 06:02

## 2022-11-04 RX ADMIN — ACETAMINOPHEN 650 MG: 325 TABLET ORAL at 00:22

## 2022-11-04 RX ADMIN — SODIUM CHLORIDE, PRESERVATIVE FREE 10 ML: 5 INJECTION INTRAVENOUS at 13:25

## 2022-11-04 RX ADMIN — LACTULOSE 45 ML: 20 SOLUTION ORAL at 08:49

## 2022-11-04 RX ADMIN — OXYCODONE 15 MG: 5 TABLET ORAL at 00:22

## 2022-11-04 RX ADMIN — SODIUM CHLORIDE, PRESERVATIVE FREE 10 ML: 5 INJECTION INTRAVENOUS at 13:24

## 2022-11-04 RX ADMIN — DOXAZOSIN MESYLATE 2 MG: 2 TABLET ORAL at 08:49

## 2022-11-04 RX ADMIN — ACETAMINOPHEN 650 MG: 325 TABLET ORAL at 12:00

## 2022-11-04 RX ADMIN — TAMSULOSIN HYDROCHLORIDE 0.4 MG: 0.4 CAPSULE ORAL at 08:49

## 2022-11-04 RX ADMIN — DEXAMETHASONE 4 MG: 4 TABLET ORAL at 00:22

## 2022-11-04 RX ADMIN — TIZANIDINE 2 MG: 2 TABLET ORAL at 15:20

## 2022-11-04 RX ADMIN — POLYETHYLENE GLYCOL 3350 17 G: 17 POWDER, FOR SOLUTION ORAL at 08:49

## 2022-11-04 RX ADMIN — HYDROMORPHONE HYDROCHLORIDE 1 MG: 1 INJECTION, SOLUTION INTRAMUSCULAR; INTRAVENOUS; SUBCUTANEOUS at 13:24

## 2022-11-04 RX ADMIN — SODIUM CHLORIDE, PRESERVATIVE FREE 10 ML: 5 INJECTION INTRAVENOUS at 05:29

## 2022-11-04 RX ADMIN — GABAPENTIN 100 MG: 300 CAPSULE ORAL at 08:49

## 2022-11-04 RX ADMIN — OXYCODONE 15 MG: 5 TABLET ORAL at 18:29

## 2022-11-04 RX ADMIN — HYDROMORPHONE HYDROCHLORIDE 1 MG: 1 INJECTION, SOLUTION INTRAMUSCULAR; INTRAVENOUS; SUBCUTANEOUS at 21:57

## 2022-11-04 RX ADMIN — OXYCODONE 15 MG: 5 TABLET ORAL at 11:49

## 2022-11-04 RX ADMIN — TIZANIDINE 2 MG: 2 TABLET ORAL at 21:55

## 2022-11-04 RX ADMIN — SENNOSIDES 17.2 MG: 8.6 TABLET, FILM COATED ORAL at 18:29

## 2022-11-04 RX ADMIN — DEXAMETHASONE 4 MG: 4 TABLET ORAL at 06:02

## 2022-11-04 RX ADMIN — PANTOPRAZOLE SODIUM 40 MG: 40 TABLET, DELAYED RELEASE ORAL at 06:31

## 2022-11-04 RX ADMIN — DEXAMETHASONE 4 MG: 4 TABLET ORAL at 17:49

## 2022-11-04 RX ADMIN — HYDROMORPHONE HYDROCHLORIDE 1 MG: 1 INJECTION, SOLUTION INTRAMUSCULAR; INTRAVENOUS; SUBCUTANEOUS at 06:02

## 2022-11-04 RX ADMIN — BISACODYL 10 MG: 10 SUPPOSITORY RECTAL at 08:49

## 2022-11-04 RX ADMIN — ALLOPURINOL 100 MG: 100 TABLET ORAL at 08:49

## 2022-11-04 RX ADMIN — CARVEDILOL 3.12 MG: 3.12 TABLET, FILM COATED ORAL at 16:48

## 2022-11-04 RX ADMIN — TIZANIDINE 2 MG: 2 TABLET ORAL at 08:49

## 2022-11-04 RX ADMIN — GABAPENTIN 100 MG: 300 CAPSULE ORAL at 15:20

## 2022-11-04 RX ADMIN — CARVEDILOL 3.12 MG: 3.12 TABLET, FILM COATED ORAL at 08:49

## 2022-11-04 RX ADMIN — LEVOTHYROXINE SODIUM 125 MCG: 0.12 TABLET ORAL at 06:31

## 2022-11-04 RX ADMIN — CASTOR OIL AND BALSAM, PERU: 788; 87 OINTMENT TOPICAL at 08:51

## 2022-11-04 RX ADMIN — HYDROMORPHONE HYDROCHLORIDE 1 MG: 1 INJECTION, SOLUTION INTRAMUSCULAR; INTRAVENOUS; SUBCUTANEOUS at 17:52

## 2022-11-04 RX ADMIN — OXYCODONE 15 MG: 5 TABLET ORAL at 15:20

## 2022-11-04 RX ADMIN — GABAPENTIN 100 MG: 300 CAPSULE ORAL at 21:55

## 2022-11-04 NOTE — PROGRESS NOTES
Problem: Mobility Impaired (Adult and Pediatric)  Goal: *Acute Goals and Plan of Care (Insert Text)  Description: FUNCTIONAL STATUS PRIOR TO ADMISSION: Pt lives at home, had been alone but recently since amputation his friend has been staying with him. Pt pt's request, talked with friend on phone to get status PTA. She stated pt was functioning at w/c level with an electric scooter; needed SBA for either pivot transfer or sliding board transfer (does have grace but she states he did better without); needed help with dressing and uses wipes for bathing because he could not get onto shower chair any longer. She states he was going to OPPT/OT at [facility]. She states they were apparently working on getting him a care aide. Pt also states that he has a  for his L AKA (5 weeks ago) but it no longer fits. He was unable to fill in where he got it. HOME SUPPORT PRIOR TO ADMISSION: The patient lived alone with friend to provide assistance. Physical Therapy Goals  Initiated 10/17/2022  1. Patient will move from supine to sit and sit to supine , scoot up and down, and roll side to side in bed with modified independence within 7 day(s). 2.  Patient will transfer from bed to chair and chair to bed with minimal assistance/contact guard assist using the least restrictive device within 7 day(s). 3.  Patient will show good sitting balance static and dynamic for safe bed mobility and transfers within 7 days. Physical Therapy Goals  Re-Assessed 10/28/2022  1. Patient will move from supine to sit and sit to supine , scoot up and down, and roll side to side in bed with minimal assistance within 7 day(s). 2.  Patient will transfer from bed to chair and chair to bed with moderate assist using the least restrictive device within 7 day(s). 3.  Patient will show good sitting balance static and dynamic for safe bed mobility and transfers within 7 days.   4.  Patient will demonstrate 5 exercises without verbal cueing to improve UE/LE ROM and strength within 7 days. Outcome: Progressing Towards Goal    PHYSICAL THERAPY TREATMENT  Patient: Ilana Razo (20 y.o. male)  Date: 11/4/2022  Diagnosis: COPD with acute exacerbation ([facility] [J44.1] <principal problem not specified>      Precautions: Fall, Contact (ESBL wound)  Chart, physical therapy assessment, plan of care and goals were reviewed. ASSESSMENT  Patient continues with skilled PT services and is progressing towards goals. Required 2 attempts to see patient and he still tried to refuse on PT's 2nd attempt. First attempt he stated he needed to go to the bathroom which takes awhile and second time he was getting ready to eat lunch. Nurse present during session to help encourage patient to participate in order to work toward his goal of wanting to go to [facility]. He was able to perform supine-sit transfer with contact guard assist, however HOB was elevated. He required minimum assistance for sit-supine transfer due to laying horizontally across the bed secondary to sliding when he was attempting to sit back down from standing. Patient was very anxious and nervous about standing, stating he had no function on his L side due to previous CVA. He attempted 3x to perform sit<>stand transfer, required height of bed significantly raised and maximum assistance x 2. Upon standing patient leaned heavily to the right. Patient desires to go to [facility] at this time for rehab. Current Level of Function Impacting Discharge (mobility/balance): overall Mitali/CGA for bed mobility, maxA x 2 to stand    Other factors to consider for discharge: multiple co-morbidities, decreased strength and endurance         PLAN :  Patient continues to benefit from skilled intervention to address the above impairments. Continue treatment per established plan of care. to address goals.     Recommendation for discharge: (in order for the patient to meet his/her long term goals)  Therapy 3 hours per day 5-7 days per week    This discharge recommendation:  Has been made in collaboration with the attending provider and/or case management    IF patient discharges home will need the following DME: to be determined (TBD)       SUBJECTIVE:   Patient stated Jeanne Christy time I try to do something someone walks in.    OBJECTIVE DATA SUMMARY:   Critical Behavior:  Neurologic State: Alert, Eyes open spontaneously  Orientation Level: Oriented X4  Cognition: Follows commands  Safety/Judgement: Awareness of environment  Functional Mobility Training:  Bed Mobility:  Rolling: Contact guard assistance  Supine to Sit: Contact guard assistance  Sit to Supine: Minimum assistance           Transfers:  Sit to Stand: Maximum assistance;Assist x2  Stand to Sit: Maximum assistance;Assist x2                             Balance:  Sitting: Impaired  Sitting - Static: Good (unsupported)  Sitting - Dynamic: Fair (occasional)  Standing: Impaired  Standing - Static: Poor;Constant support  Standing - Dynamic : Not tested          Stairs: Therapeutic Exercises:   none  Pain Rating:  none    Activity Tolerance:   Fair    After treatment patient left in no apparent distress:   Supine in bed and Call bell within reach    COMMUNICATION/COLLABORATION:   The patients plan of care was discussed with: Registered nurse.      Erica Rollins, PT   Time Calculation: 15 mins

## 2022-11-04 NOTE — PROGRESS NOTES
INTERVENTIONAL RADIOLOGY  Progress Note      Patient:  Cheng Soliz  :  1965  Age:  61 y.o. MRN:  [id number]    Today's Date:  2022  Admission Date:  10/16/2022  Hospital Day:  23      SUBJECTIVE   Cheng Soliz is a 61 y.o. male with a history of back pain status post T5 and T6 kyphoplasty on 10/27/22. IR was asked to evaluate patient due to ongoing pain post procedure. Pt reported back pain improved initially after kyphoplasty, then pain returned. Reports pain radiates to his right lower extremity and extends to upper right arm, which has been a chronic issue for him. MRI of thoracic spine demonstrated possible migration of cement at T6 on the left. Neurosurgery has seen the patient and recommends conservative management. OBJECTIVE   IMAGING STUDIES  I personally reviewed the following imaging studies:  MRI Results (most recent):  Results from Hospital Encounter encounter on 10/16/22    MRI [facility] SPINE WO CONT    Narrative  EXAM: MRI [facility] SPINE WO CONT    INDICATION: persistent back pain despite kyphoplasty. May need anesthesia    COMPARISON: 10/27/2022 and 10/24/2022    TECHNIQUE: MR imaging of the thoracic spine was performed using the following  sequences: sagittal T1, T2, stir; axial T1, T2.    CONTRAST: None. FINDINGS:    There is normal alignment of the thoracic spine. Patient is post kyphoplasty of  T5 and T6. Cloteal Irwin No change in loss of height at each of these vertebral bodies. However the cement at T6 on the left has broken through the posterior wall of  the vertebral body and cortex extending into the epidural space. This results in  cord compression. Fracture of T12 is unchanged. No new fracture. .    Cord is flattened and slightly increased in signal at T5-6 which is new. Probable hepatic cyst    There is no herniation or stenosis. Impression  Post kyphoplasty of T5 and T6.  The cement at T6 on the left has migrated  posteriorly since the kyphoplasty resulting in cord compression and mild cord  edema    A Perfect Serve text was sent to Dr. Sarahi Goins       IR Results (most recent):  Results from [place] encounter on 10/16/22    IR KYPHOPLASTY THORACIC    Narrative  Clinical Data: 18-year-old male with T5 and T6 acute traumatic vertebral body  fractures presents for kyphoplasty. Patient has a history of osteoporosis. : Tami Melton MD    Estimated Blood Loss: Minimal  Specimens: None  Complications: None    Procedure:  1. Fluoroscopy guided placement of cannulas into bilateral T5 and T6 pedicles. 2. Balloon kyphoplasty of T5 and T6 with cement augmentation. 3. IV conscious sedation. FLUOROSCOPY TIME: 24.5 min  FLUOROSCOPY DOSE (air kerma): 1686 mGy    Body:  Following discussion of risks, benefits and alternatives, informed written  consent was obtained. The patient was placed prone on the fluoroscopic exam table and prepped and  draped in sterile fashion. The T5 and T6 levels were identified  fluoroscopically. We initially focused on the T5 vertebral body. 1% lidocaine was administered to  the subcutaneous tissues for local anesthesia, and 0.25% bupivacaine was  administered into the periosteum of the bilateral pedicles under fluoroscopic  guidance using a 22-gauge 10 cm spinal needle for local anesthesia. Under  biplane fluoroscopic guidance, the the right pedicle was traversed with a Kyphon  cannula. The tip of the cannula was placed just within the vertebral body. A  drill was then advanced to within the anterior portion of the vertebral body. This procedure was repeated on the left pedicle. Next we moved to the T6 vertebral body. 1% lidocaine was administered to the  subcutaneous tissues for local anesthesia, and 0.25% bupivacaine was  administered into the periosteum of the bilateral pedicles under fluoroscopic  guidance using a 22-gauge 10 cm spinal needle for local anesthesia.  Under  biplane fluoroscopic guidance, the the right pedicle was traversed with a Kyphon  cannula. The tip of the cannula was placed just within the vertebral body. A  drill was then advanced to within the anterior portion of the vertebral body. This procedure was repeated on the left pedicle. Kyphoplasty balloons were then inserted into each cannula, and inflated gently  to create space for cement augmentation. The balloons were deflated and removed. Then, cement was infused via each cannula under fluoroscopic guidance. The  cannulas were removed and bandages were applied. There were no immediate  complications. The patient tolerated the procedure well and left the interventional radiology  suite in stable condition. Impression  1. Successful T5 and T6 kyphoplasty as above. 2.  Recommend initiation of medical management for osteoporosis if not already  performed. LABS  Lab Results   Component Value Date/Time    WBC 8.0 11/03/2022 10:00 AM    HGB 9.2 (L) 11/03/2022 10:00 AM    HCT 29.9 (L) 11/03/2022 10:00 AM    PLATELET 395 (H) 67/51/7125 10:00 AM    MCV 79.1 (L) 11/03/2022 10:00 AM     Lab Results   Component Value Date/Time    Sodium 135 (L) 11/03/2022 10:00 AM    Potassium 4.9 11/03/2022 10:00 AM    Chloride 99 11/03/2022 10:00 AM    CO2 29 11/03/2022 10:00 AM    Anion gap 7 11/03/2022 10:00 AM    Glucose 239 (H) 11/03/2022 10:00 AM    BUN 6 11/03/2022 10:00 AM    Creatinine 0.96 11/03/2022 10:00 AM    BUN/Creatinine ratio 6 (L) 11/03/2022 10:00 AM    GFR est AA >60 07/14/2021 03:30 PM    GFR est non-AA >60 07/14/2021 03:30 PM    Calcium 9.0 11/03/2022 10:00 AM     Lab Results   Component Value Date/Time    INR 1.1 12/21/2017 11:28 AM    Prothrombin time 11.4 (H) 12/21/2017 11:28 AM       DRAIN OUTPUT  Output by Drain (mL) 11/02/22 0701 - 11/02/22 1900 11/02/22 1901 - 11/03/22 0700 11/03/22 0701 - 11/03/22 1900 11/03/22 1901 - 11/04/22 0700 11/04/22 0701 - 11/04/22 1332   Patient has no LDAs of requested type attached.        PHYSICAL EXAM  /71 (BP 1 Location: Right upper arm, BP Patient Position: At rest)   Pulse 64   Temp 98.1 °F (36.7 °C)   Resp 20   Ht 6' (1.829 m)   Wt 103 kg (227 lb)   SpO2 93%   BMI 30.79 kg/m²   General:  NAD  Heart:  RRR  Lungs:  NWOB  MSK: Right foot warm and well perfused, distal motor sensations intact  Neurological:  AAOX3    ASSESSMENT / PLAN   This is a 61 y.o. male with a history of ongoing back pain status post T5 and T6 kyphoplasty. MRI with concern for possible migration of cement at T6 on the left. Patient's chronic pain on right side. Recommend CT scan to better evaluate possible migration of cement versus hematoma/ other debris. Agree with neurosurgery with conservative management at this time. Case reviewed with Dr. Magdalene Torres.         [address] [place] [address] Radiology, Onofre Castellano.

## 2022-11-04 NOTE — PROGRESS NOTES
Transition of Care Plan:     RUR: 16%  Disposition: home with home health vs IPR at [facility]     Cardiac connections willing to accept if able to secure an ordering MD to follow     Per [place] insurance CM - [facility] patient has medicaid personal care aide services ready to start upon discharge to home     Follow up appointments: Cindy Quevedo friend states patient has a new PCP appt on Nov but has  not provided this info to [facility]  DME needed: Hospital bed   Transportation at Discharge: [id number] [facility] or means to access home:    patient    IM Medicare Letter:n/a  Is patient a  and connected with the [employer] [facility] [email or web address] ? If yes, was [employer] transfer form completed and [facility] notified? Caregiver Contact: Nelda Pulido [phone]  Discharge Caregiver contacted prior to discharge? Per patient  Care Conference needed?:    no     3:12 p.m. CM checked referral.  [facility] is following pt for participation. CM informed by pt's nurse that pt wants to go to rehab at [address] [place] [address]. CM met with pt who stated he wants to work with therapy to be able to get into [facility]. FOC signed and placed on bedside chart.   CM will send referral.    Jos Hernandez, MSW  Care Management, [place] [id number]

## 2022-11-04 NOTE — PROGRESS NOTES
This nurse in to see patient this morning, extensive conversation has taken place in regard to what the patient wants. Patient expresses he wants to go to rehab to get better and stronger at this time. This nurse explained to patient that it is great to hear that he is ready to move forward with his plan on getting stronger and that this nurse would express his concerns with his physician and his .

## 2022-11-04 NOTE — PROGRESS NOTES
Bedside shift change report given to Misael Mae LPN (oncoming nurse) by [employer] RN (offgoing nurse).  Report included the following information SBAR, Kardex, Intake/Output, MAR.

## 2022-11-04 NOTE — PROGRESS NOTES
Problem: Patient Education: Go to Patient Education Activity  Goal: Patient/Family Education  Outcome: Progressing Towards Goal     Problem: Patient Education: Go to Patient Education Activity  Goal: Patient/Family Education  Outcome: Progressing Towards Goal     Problem: Pressure Injury - Risk of  Goal: *Prevention of pressure injury  Description: Document Dejuan Scale and appropriate interventions in the flowsheet. Outcome: Progressing Towards Goal  Note: Pressure Injury Interventions:  Sensory Interventions: Assess changes in LOC    Moisture Interventions: Absorbent underpads    Activity Interventions: Chair cushion    Mobility Interventions: Assess need for specialty bed    Nutrition Interventions: Document food/fluid/supplement intake    Friction and Shear Interventions: Minimize layers                Problem: Patient Education: Go to Patient Education Activity  Goal: Patient/Family Education  Outcome: Progressing Towards Goal     Problem: Falls - Risk of  Goal: *Absence of Falls  Description: Document Jordon Fall Risk and appropriate interventions in the flowsheet.   Outcome: Progressing Towards Goal  Note: Fall Risk Interventions:  Mobility Interventions: Patient to call before getting OOB    Mentation Interventions: Bed/chair exit alarm    Medication Interventions: Assess postural VS orthostatic hypotension, Patient to call before getting OOB    Elimination Interventions: Bed/chair exit alarm, Call light in reach, Patient to call for help with toileting needs    History of Falls Interventions: Bed/chair exit alarm, Room close to nurse's station         Problem: Patient Education: Go to Patient Education Activity  Goal: Patient/Family Education  Outcome: Progressing Towards Goal     Problem: Pain  Goal: *Control of Pain  Outcome: Progressing Towards Goal     Problem: Patient Education: Go to Patient Education Activity  Goal: Patient/Family Education  Outcome: Progressing Towards Goal     Problem: Patient Education: Go to Patient Education Activity  Goal: Patient/Family Education  Outcome: Progressing Towards Goal

## 2022-11-05 PROCEDURE — 65270000046 HC RM TELEMETRY

## 2022-11-05 PROCEDURE — 74011000250 HC RX REV CODE- 250: Performed by: STUDENT IN AN ORGANIZED HEALTH CARE EDUCATION/TRAINING PROGRAM

## 2022-11-05 PROCEDURE — 74011250636 HC RX REV CODE- 250/636: Performed by: STUDENT IN AN ORGANIZED HEALTH CARE EDUCATION/TRAINING PROGRAM

## 2022-11-05 PROCEDURE — 94760 N-INVAS EAR/PLS OXIMETRY 1: CPT

## 2022-11-05 PROCEDURE — 74011250637 HC RX REV CODE- 250/637: Performed by: STUDENT IN AN ORGANIZED HEALTH CARE EDUCATION/TRAINING PROGRAM

## 2022-11-05 PROCEDURE — 77010033678 HC OXYGEN DAILY

## 2022-11-05 PROCEDURE — 2709999900 HC NON-CHARGEABLE SUPPLY

## 2022-11-05 PROCEDURE — 74011250637 HC RX REV CODE- 250/637: Performed by: INTERNAL MEDICINE

## 2022-11-05 RX ADMIN — GABAPENTIN 100 MG: 300 CAPSULE ORAL at 09:46

## 2022-11-05 RX ADMIN — MELATONIN 3 MG: at 22:45

## 2022-11-05 RX ADMIN — HYDROMORPHONE HYDROCHLORIDE 1 MG: 1 INJECTION, SOLUTION INTRAMUSCULAR; INTRAVENOUS; SUBCUTANEOUS at 22:45

## 2022-11-05 RX ADMIN — PANTOPRAZOLE SODIUM 40 MG: 40 TABLET, DELAYED RELEASE ORAL at 09:46

## 2022-11-05 RX ADMIN — CASTOR OIL AND BALSAM, PERU: 788; 87 OINTMENT TOPICAL at 09:45

## 2022-11-05 RX ADMIN — SODIUM CHLORIDE, PRESERVATIVE FREE 10 ML: 5 INJECTION INTRAVENOUS at 06:16

## 2022-11-05 RX ADMIN — TAMSULOSIN HYDROCHLORIDE 0.4 MG: 0.4 CAPSULE ORAL at 09:46

## 2022-11-05 RX ADMIN — HYDROMORPHONE HYDROCHLORIDE 1 MG: 1 INJECTION, SOLUTION INTRAMUSCULAR; INTRAVENOUS; SUBCUTANEOUS at 09:53

## 2022-11-05 RX ADMIN — ACETAMINOPHEN 650 MG: 325 TABLET ORAL at 23:45

## 2022-11-05 RX ADMIN — LEVOTHYROXINE SODIUM 125 MCG: 0.12 TABLET ORAL at 09:45

## 2022-11-05 RX ADMIN — COLCHICINE 0.6 MG: 0.6 TABLET, FILM COATED ORAL at 09:51

## 2022-11-05 RX ADMIN — METHADONE HYDROCHLORIDE 60 MG: 10 CONCENTRATE ORAL at 09:44

## 2022-11-05 RX ADMIN — ALLOPURINOL 100 MG: 100 TABLET ORAL at 09:45

## 2022-11-05 RX ADMIN — DOXAZOSIN MESYLATE 2 MG: 2 TABLET ORAL at 09:46

## 2022-11-05 RX ADMIN — CARVEDILOL 3.12 MG: 3.12 TABLET, FILM COATED ORAL at 18:32

## 2022-11-05 RX ADMIN — OXYCODONE 15 MG: 5 TABLET ORAL at 00:06

## 2022-11-05 RX ADMIN — OXYCODONE 15 MG: 5 TABLET ORAL at 20:50

## 2022-11-05 RX ADMIN — CARVEDILOL 3.12 MG: 3.12 TABLET, FILM COATED ORAL at 09:46

## 2022-11-05 RX ADMIN — DEXAMETHASONE 4 MG: 4 TABLET ORAL at 18:15

## 2022-11-05 RX ADMIN — TIZANIDINE 2 MG: 2 TABLET ORAL at 09:45

## 2022-11-05 RX ADMIN — CASTOR OIL AND BALSAM, PERU: 788; 87 OINTMENT TOPICAL at 22:53

## 2022-11-05 RX ADMIN — ACETAMINOPHEN 650 MG: 325 TABLET ORAL at 13:05

## 2022-11-05 RX ADMIN — SODIUM CHLORIDE, PRESERVATIVE FREE 10 ML: 5 INJECTION INTRAVENOUS at 13:11

## 2022-11-05 RX ADMIN — ACETAMINOPHEN 650 MG: 325 TABLET ORAL at 18:15

## 2022-11-05 RX ADMIN — ACETAMINOPHEN 650 MG: 325 TABLET ORAL at 06:15

## 2022-11-05 RX ADMIN — OXYCODONE 15 MG: 5 TABLET ORAL at 13:06

## 2022-11-05 RX ADMIN — GABAPENTIN 100 MG: 300 CAPSULE ORAL at 18:16

## 2022-11-05 RX ADMIN — HYDROMORPHONE HYDROCHLORIDE 1 MG: 1 INJECTION, SOLUTION INTRAMUSCULAR; INTRAVENOUS; SUBCUTANEOUS at 18:49

## 2022-11-05 RX ADMIN — SODIUM CHLORIDE, PRESERVATIVE FREE 10 ML: 5 INJECTION INTRAVENOUS at 06:15

## 2022-11-05 RX ADMIN — DEXAMETHASONE 4 MG: 4 TABLET ORAL at 13:05

## 2022-11-05 RX ADMIN — ACETAMINOPHEN 650 MG: 325 TABLET ORAL at 00:07

## 2022-11-05 RX ADMIN — TIZANIDINE 2 MG: 2 TABLET ORAL at 18:16

## 2022-11-05 RX ADMIN — DEXAMETHASONE 4 MG: 4 TABLET ORAL at 23:45

## 2022-11-05 RX ADMIN — DEXAMETHASONE 4 MG: 4 TABLET ORAL at 00:07

## 2022-11-05 RX ADMIN — DEXAMETHASONE 4 MG: 4 TABLET ORAL at 06:15

## 2022-11-05 RX ADMIN — GABAPENTIN 100 MG: 300 CAPSULE ORAL at 22:45

## 2022-11-05 RX ADMIN — SODIUM CHLORIDE, PRESERVATIVE FREE 10 ML: 5 INJECTION INTRAVENOUS at 22:17

## 2022-11-05 RX ADMIN — SODIUM CHLORIDE, PRESERVATIVE FREE 10 ML: 5 INJECTION INTRAVENOUS at 22:16

## 2022-11-05 RX ADMIN — TIZANIDINE 2 MG: 2 TABLET ORAL at 22:45

## 2022-11-05 NOTE — PROGRESS NOTES
Hospitalist Progress Note    NAME: Lavern Grant   :  1965   MRN:  [id number]       Assessment / Plan:  T12 inferior vertebral endplate compression fracture  Acute low back pain due to above     -MRI shows evidence of acute T12 fracture. Kyphoplasty performed 10/27/22  No saddle anesthesia, no numbness, no focal weakness, no issues with urination  Still with persistent back pain. 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 Talked to neurosurgery Dr. Brando Leblanc, doesn't think he needs surgical intervention as he doesn't have significant weakness of the RLE (RLE power 3/5 , has AKA on the left)  Discussed with Radiology, recommended doing CT thoracic spine, CT reviewed, looks like cement displaced. Pain control with , methadone 60 mg daily, oxycodone PRN. Added dilaudid today as pain not controlled  Will need SNF on discharge  Having BM now     Carbapenem resistant urinary tract infection  History of recent Gross catheter and also straight cath  -Urine culture grew Carbapenem resistant Klebsiella pneumonia  -Based on urine culture and sensitivity, currently on gentamicin  - consulted ID 10/26/22, continued Gentamicin through 10/28/22, course completed     Suspected aspiration pneumonia  Acute hypoxic respiratory failure  -MRI shows evidence of aspiration pneumonia. Chest x-ray also shows basilar infiltrates with possible effusion  -He is also reporting some worsening of shortness of breath along with cough and small amount of phlegm  -started empiric antibiotics with cefepime and Flagyl, started 10/25/22  - changed to Cefdinir flagyl, EOT 10/31/22, 7 day course  -Aspiration precautions.  -We will change to dysphagia diet. We will consult speech therapy, appreciate assistance  -Has leukocytosis of 14,000 but he was recently on steroids which may be contributing.   Procal normal  -Continue oxygen nasal cannula, Wean as tolerated  - CXR 10/28/22 showed L hemithorax opacity possible LLL collapse, L pleural effusion  - incentive spirometery  - consulted Pulmonology for persistent O2 requirement, added bronchodilators and mucolytics  - followup CT in 1 year for 3mm pulm nodule     Possible gout flare - improved  Describes knee pain, similar to previous gout flare  - started colchicine 1.2 x1, 0.6 x1, then 0.6 daily  - started allopurinol 100 daily. Was prescribed 300 daily but not taking consistently, so essentially starting over     Chronic pain on methadone  -Continue home methadone. Rest of pain meds as above    Debility secondary to CVA and recent left AKA  -Continue PT OT     Chronic wounds  -Continue wound care     Mild COPD exacerbation  Prn duonebs  S/p prednisone    Leukocytosis likely due to steroids - improved  - He is afebrile  - monitor     GERD  Protonix daily     Essential hypertension  Continue PTA Coreg     Hypothyroidism  Continue PTA Synthroid     Incidental findings requiring outpatient follow up/ evaluation:  3 mm right lung nodule. -Guidelines recommend repeat scan in 12 months  Increase in size of intermediate density left renal lesion. Recommend  follow-up with ultrasound in 6 months. Left AKA     30.0 - 39.9 Obese / Body mass index is 30.79 kg/m².      Code status: Full  Prophylaxis: Lovenox  Recommended Disposition: SNF  BARBARA: 11/5  Barriers: IPR placement     Subjective:     Chief Complaint / Reason for Physician Visit  Follow up for vertebral fracture  He continues to have back pain  Eager to go to IPR    Review of Systems:  Symptom Y/N Comments  Symptom Y/N Comments   Fever/Chills n   Chest Pain n    Poor Appetite n   Edema n    Cough    Abdominal Pain     Sputum    Joint Pain y Back pain   SOB/BECK    Pruritis/Rash     Nausea/vomit    Tolerating PT/OT     Diarrhea    Tolerating Diet     Constipation    Other       Could NOT obtain due to:      Objective: VITALS:   Last 24hrs VS reviewed since prior progress note. Most recent are:  Patient Vitals for the past 24 hrs:   Temp Pulse Resp BP SpO2   11/05/22 0950 98.1 °F (36.7 °C) 67 17 (!) 172/78 91 %   11/05/22 0300 98.4 °F (36.9 °C) 66 18 (!) 155/88 92 %   11/05/22 0004 98.8 °F (37.1 °C) 60 18 (!) 176/86 90 %   11/04/22 2038 98 °F (36.7 °C) 72 18 125/64 90 %       Intake/Output Summary (Last 24 hours) at 11/5/2022 1502  Last data filed at 11/5/2022 0403  Gross per 24 hour   Intake --   Output 1525 ml   Net -1525 ml        I had a face to face encounter and independently examined this patient on 11/5/2022, as outlined below:  PHYSICAL EXAM:  General: Awake, No acute distress  EENT:  EOMI. Anicteric sclerae. MMM  Resp:  CTA bilaterally, no wheezing or rales. No accessory muscle use  CV:  Regular  rhythm,  No edema  GI:  Soft, Non distended, Non tender. +Bowel sounds  Neurologic:  Alert and oriented X 3, normal speech,   Psych:   Not anxious nor agitated  Ext: Power 3/5 on RLE, left AKA (has prior weakness)  Power 5/5 on RUE and has residual weakness on left    Reviewed most current lab test results and cultures  YES  Reviewed most current radiology test results   YES  Review and summation of old records today    NO  Reviewed patient's current orders and MAR    YES  PMH/ reviewed - no change compared to H&P  ________________________________________________________________________  Care Plan discussed with:    Comments   Patient y    Family      RN y    Care Manager     Consultant                       y Multidiciplinary team rounds were held today with , nursing, pharmacist and clinical coordinator. Patient's plan of care was discussed; medications were reviewed and discharge planning was addressed.      ________________________________________________________________________  Total NON critical care TIME:  36   Minutes    Total CRITICAL CARE TIME Spent:   Minutes non procedure based      Comments >50% of visit spent in counseling and coordination of care     ________________________________________________________________________  Camille Canela MD     Procedures: see electronic medical records for all procedures/Xrays and details which were not copied into this note but were reviewed prior to creation of Plan. LABS:  I reviewed today's most current labs and imaging studies.   Pertinent labs include:  Recent Labs     11/03/22  1000   WBC 8.0   HGB 9.2*   HCT 29.9*   *     Recent Labs     11/03/22  1000   *   K 4.9   CL 99   CO2 29   *   BUN 6   CREA 0.96   CA 9.0   MG 2.0   PHOS 5.2*       Signed: Camille Canela MD

## 2022-11-05 NOTE — PROGRESS NOTES
Bedside shift change report given to [address] Ell[address] Jackson (oncoming nurse) by Neil Saint LPN (offgoing nurse). Report included the following information SBAR, Kardex, and MAR. Patient requested dilaudid rather than oxy. Oxy returned with Siobhan RN. Fentanyl patch wasted with Siobhan RN. Patient refused wound care to sacrum at this time. All other wounds treated.

## 2022-11-05 NOTE — PROGRESS NOTES
Patient right leg wound inner dressing out,  declined change of dressing, wants the wound nurse to dress it during the day.

## 2022-11-05 NOTE — PROGRESS NOTES
Bedside and Verbal shift change report given to [address] [place] [address] (oncoming nurse) by Yesy Munoz (offgoing nurse). Report included the following information SBAR, Kardex, Intake/Output, MAR, and Med Rec Status.

## 2022-11-06 PROCEDURE — 74011250637 HC RX REV CODE- 250/637: Performed by: STUDENT IN AN ORGANIZED HEALTH CARE EDUCATION/TRAINING PROGRAM

## 2022-11-06 PROCEDURE — 74011000250 HC RX REV CODE- 250: Performed by: STUDENT IN AN ORGANIZED HEALTH CARE EDUCATION/TRAINING PROGRAM

## 2022-11-06 PROCEDURE — 74011250636 HC RX REV CODE- 250/636: Performed by: STUDENT IN AN ORGANIZED HEALTH CARE EDUCATION/TRAINING PROGRAM

## 2022-11-06 PROCEDURE — 65270000046 HC RM TELEMETRY

## 2022-11-06 PROCEDURE — 74011250637 HC RX REV CODE- 250/637: Performed by: INTERNAL MEDICINE

## 2022-11-06 PROCEDURE — 77010033678 HC OXYGEN DAILY

## 2022-11-06 PROCEDURE — 94760 N-INVAS EAR/PLS OXIMETRY 1: CPT

## 2022-11-06 RX ADMIN — GABAPENTIN 100 MG: 300 CAPSULE ORAL at 09:20

## 2022-11-06 RX ADMIN — SODIUM CHLORIDE, PRESERVATIVE FREE 10 ML: 5 INJECTION INTRAVENOUS at 17:33

## 2022-11-06 RX ADMIN — PANTOPRAZOLE SODIUM 40 MG: 40 TABLET, DELAYED RELEASE ORAL at 09:20

## 2022-11-06 RX ADMIN — TIZANIDINE 2 MG: 2 TABLET ORAL at 21:34

## 2022-11-06 RX ADMIN — HYDROMORPHONE HYDROCHLORIDE 1 MG: 1 INJECTION, SOLUTION INTRAMUSCULAR; INTRAVENOUS; SUBCUTANEOUS at 12:45

## 2022-11-06 RX ADMIN — TAMSULOSIN HYDROCHLORIDE 0.4 MG: 0.4 CAPSULE ORAL at 09:20

## 2022-11-06 RX ADMIN — CARVEDILOL 3.12 MG: 3.12 TABLET, FILM COATED ORAL at 17:33

## 2022-11-06 RX ADMIN — DEXAMETHASONE 4 MG: 4 TABLET ORAL at 14:38

## 2022-11-06 RX ADMIN — ACETAMINOPHEN 650 MG: 325 TABLET ORAL at 09:20

## 2022-11-06 RX ADMIN — GABAPENTIN 100 MG: 300 CAPSULE ORAL at 21:34

## 2022-11-06 RX ADMIN — ALLOPURINOL 100 MG: 100 TABLET ORAL at 09:20

## 2022-11-06 RX ADMIN — GABAPENTIN 100 MG: 300 CAPSULE ORAL at 17:32

## 2022-11-06 RX ADMIN — ACETAMINOPHEN 650 MG: 325 TABLET ORAL at 17:32

## 2022-11-06 RX ADMIN — ACETAMINOPHEN 650 MG: 325 TABLET ORAL at 14:38

## 2022-11-06 RX ADMIN — COLCHICINE 0.6 MG: 0.6 TABLET, FILM COATED ORAL at 09:20

## 2022-11-06 RX ADMIN — OXYCODONE 15 MG: 5 TABLET ORAL at 17:35

## 2022-11-06 RX ADMIN — SODIUM CHLORIDE, PRESERVATIVE FREE 10 ML: 5 INJECTION INTRAVENOUS at 21:46

## 2022-11-06 RX ADMIN — CARVEDILOL 3.12 MG: 3.12 TABLET, FILM COATED ORAL at 09:20

## 2022-11-06 RX ADMIN — METHADONE HYDROCHLORIDE 60 MG: 10 CONCENTRATE ORAL at 09:17

## 2022-11-06 RX ADMIN — TIZANIDINE 2 MG: 2 TABLET ORAL at 09:20

## 2022-11-06 RX ADMIN — OXYCODONE 15 MG: 5 TABLET ORAL at 03:34

## 2022-11-06 RX ADMIN — DOXAZOSIN MESYLATE 2 MG: 2 TABLET ORAL at 09:20

## 2022-11-06 RX ADMIN — CASTOR OIL AND BALSAM, PERU: 788; 87 OINTMENT TOPICAL at 09:22

## 2022-11-06 RX ADMIN — MELATONIN 3 MG: at 21:34

## 2022-11-06 RX ADMIN — SODIUM CHLORIDE, PRESERVATIVE FREE 10 ML: 5 INJECTION INTRAVENOUS at 06:12

## 2022-11-06 RX ADMIN — LEVOTHYROXINE SODIUM 125 MCG: 0.12 TABLET ORAL at 09:20

## 2022-11-06 RX ADMIN — DEXAMETHASONE 4 MG: 4 TABLET ORAL at 09:20

## 2022-11-06 RX ADMIN — TIZANIDINE 2 MG: 2 TABLET ORAL at 17:32

## 2022-11-06 RX ADMIN — OXYCODONE 15 MG: 5 TABLET ORAL at 09:20

## 2022-11-06 RX ADMIN — DEXAMETHASONE 4 MG: 4 TABLET ORAL at 17:33

## 2022-11-06 NOTE — PROGRESS NOTES
Hospitalist Progress Note    NAME: Reynaldo Queen   :  1965   MRN:  [id number]       Assessment / Plan:  Intractable back pain   D/t T5 and T6 fracture:    MRI 10/24/ 2022  1. Increased subacute T5 partial compression fracture. 2. Unchanged subacute T6 partial compression fracture. Bony retropulsion at T6  causes mild central spinal canal stenosis without cord compression. 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 Talked to neurosurgery Dr. Justin Mckeon, doesn't think he needs surgical intervention as he doesn't have significant weakness of the RLE (RLE power 3/5 , has AKA on the left)  Discussed with Radiology, recommended doing CT thoracic spine, CT reviewed, looks like cement displaced. Pain control with , methadone 60 mg daily, oxycodone PRN. Cw/ dilaudid  PT/OT recommending IPR. [facility]     Carbapenem resistant urinary tract infection  History of recent Gross catheter and also straight cath  Urine culture grew Carbapenem resistant Klebsiella pneumonia  ID consulted, continued Gentamicin through 10/28/22, course completed     Suspected aspiration pneumonia  Acute hypoxic respiratory failure  -MRI shows evidence of aspiration pneumonia. Chest x-ray also shows basilar infiltrates with possible effusion  Started empiric antibiotics with cefepime and Flagyl, started 10/25/22  Changed to Cefdinir flagyl, EOT 10/31/22, 7 day course completed. Aspiration precautions. Was evaluated by speech, now on regular diet.   Procal normal  Now weaned off oxygen  CXR 10/28/22 showed L hemithorax opacity possible LLL collapse, L pleural effusion  C/w incentive spirometry  Pulmonary evaluated and signed off  Followup CT in 1 year for 3mm pulm nodule     Possible gout flare - improved  Describes knee pain, similar to previous gout flare  Cw Colchicine now on 0.6 daily  Started allopurinol 100 daily. Was prescribed 300 daily but not taking consistently, so essentially starting over     Chronic pain on methadone  -Continue home methadone. Rest of pain meds as above    Debility secondary to CVA and recent left AKA  -Continue PT OT     Chronic wounds  -Continue wound care     Mild COPD exacerbation  Prn duonebs  S/p prednisone    GERD  Protonix daily     Essential hypertension  Continue PTA Coreg     Hypothyroidism  Continue PTA Synthroid     Left AKA     30.0 - 39.9 Obese / Body mass index is 30.79 kg/m². Code status: Full  Prophylaxis: Lovenox  Recommended Disposition: SNF  BARBARA: Medically ready  Barriers: IPR placement     Subjective:     Chief Complaint / Reason for Physician Visit  Follow up for vertebral fracture  He continues to have back pain  Eager to go to IPR    Review of Systems:  Symptom Y/N Comments  Symptom Y/N Comments   Fever/Chills n   Chest Pain n    Poor Appetite n   Edema n    Cough    Abdominal Pain     Sputum    Joint Pain y Back pain   SOB/BECK    Pruritis/Rash     Nausea/vomit    Tolerating PT/OT     Diarrhea    Tolerating Diet     Constipation    Other       Could NOT obtain due to:      Objective:     VITALS:   Last 24hrs VS reviewed since prior progress note. Most recent are:  Patient Vitals for the past 24 hrs:   Temp Pulse Resp BP SpO2   11/05/22 2207 98.3 °F (36.8 °C) 70 18 (!) 150/94 94 %       Intake/Output Summary (Last 24 hours) at 11/6/2022 1223  Last data filed at 11/6/2022 0645  Gross per 24 hour   Intake --   Output 2550 ml   Net -2550 ml        I had a face to face encounter and independently examined this patient on 11/6/2022, as outlined below:  PHYSICAL EXAM:  General: Awake, No acute distress  EENT:  EOMI. Anicteric sclerae. MMM  Resp:  CTA bilaterally, no wheezing or rales.   No accessory muscle use  CV:  Regular  rhythm,  No edema  GI:  Soft, Non distended, Non tender. +Bowel sounds  Neurologic:  Alert and oriented X 3, normal speech,   Psych:   Not anxious nor agitated  Ext: Power 3/5 on RLE, left AKA (has prior weakness)  Power 5/5 on RUE and has residual weakness on left    Reviewed most current lab test results and cultures  YES  Reviewed most current radiology test results   YES  Review and summation of old records today    NO  Reviewed patient's current orders and MAR    YES  PMH/SH reviewed - no change compared to H&P  ________________________________________________________________________  Care Plan discussed with:    Comments   Patient y    Family      RN y    Care Manager     Consultant                       y Multidiciplinary team rounds were held today with , nursing, pharmacist and clinical coordinator. Patient's plan of care was discussed; medications were reviewed and discharge planning was addressed. ________________________________________________________________________  Total NON critical care TIME: 27  Minutes    Total CRITICAL CARE TIME Spent:   Minutes non procedure based      Comments   >50% of visit spent in counseling and coordination of care     ________________________________________________________________________  Shaggy Puga MD     Procedures: see electronic medical records for all procedures/Xrays and details which were not copied into this note but were reviewed prior to creation of Plan. LABS:  I reviewed today's most current labs and imaging studies. Pertinent labs include:  No results for input(s): WBC, HGB, HCT, PLT, HGBEXT, HCTEXT, PLTEXT, HGBEXT, HCTEXT, PLTEXT in the last 72 hours. No results for input(s): NA, K, CL, CO2, GLU, BUN, CREA, CA, MG, PHOS, ALB, TBIL, TBILI, ALT, INR, INREXT, INREXT in the last 72 hours.     No lab exists for component: SGOT      Signed: Shaggy Puga MD

## 2022-11-07 PROCEDURE — 74011250636 HC RX REV CODE- 250/636: Performed by: STUDENT IN AN ORGANIZED HEALTH CARE EDUCATION/TRAINING PROGRAM

## 2022-11-07 PROCEDURE — 97535 SELF CARE MNGMENT TRAINING: CPT

## 2022-11-07 PROCEDURE — 74011000250 HC RX REV CODE- 250: Performed by: STUDENT IN AN ORGANIZED HEALTH CARE EDUCATION/TRAINING PROGRAM

## 2022-11-07 PROCEDURE — 94760 N-INVAS EAR/PLS OXIMETRY 1: CPT

## 2022-11-07 PROCEDURE — 77010033678 HC OXYGEN DAILY

## 2022-11-07 PROCEDURE — 74011250637 HC RX REV CODE- 250/637: Performed by: STUDENT IN AN ORGANIZED HEALTH CARE EDUCATION/TRAINING PROGRAM

## 2022-11-07 PROCEDURE — 97530 THERAPEUTIC ACTIVITIES: CPT

## 2022-11-07 PROCEDURE — 65270000046 HC RM TELEMETRY

## 2022-11-07 PROCEDURE — 74011250637 HC RX REV CODE- 250/637: Performed by: INTERNAL MEDICINE

## 2022-11-07 RX ORDER — DEXAMETHASONE 2 MG/1
TABLET ORAL
Qty: 14 TABLET | Refills: 0 | Status: SHIPPED
Start: 2022-11-07 | End: 2022-11-19

## 2022-11-07 RX ORDER — LIDOCAINE 4 G/100G
PATCH TOPICAL
Qty: 7 PATCH | Refills: 1 | Status: SHIPPED | OUTPATIENT
Start: 2022-11-07

## 2022-11-07 RX ORDER — DEXAMETHASONE 4 MG/1
4 TABLET ORAL EVERY 12 HOURS
Status: COMPLETED | OUTPATIENT
Start: 2022-11-07 | End: 2022-11-08

## 2022-11-07 RX ORDER — OXYCODONE HYDROCHLORIDE 10 MG/1
10 TABLET ORAL
Qty: 9 TABLET | Refills: 0 | Status: SHIPPED | OUTPATIENT
Start: 2022-11-07 | End: 2022-11-19

## 2022-11-07 RX ADMIN — DEXAMETHASONE 4 MG: 4 TABLET ORAL at 01:18

## 2022-11-07 RX ADMIN — GABAPENTIN 100 MG: 300 CAPSULE ORAL at 08:13

## 2022-11-07 RX ADMIN — GABAPENTIN 100 MG: 300 CAPSULE ORAL at 16:09

## 2022-11-07 RX ADMIN — ACETAMINOPHEN 650 MG: 325 TABLET ORAL at 17:39

## 2022-11-07 RX ADMIN — SODIUM CHLORIDE, PRESERVATIVE FREE 10 ML: 5 INJECTION INTRAVENOUS at 14:00

## 2022-11-07 RX ADMIN — DEXAMETHASONE 4 MG: 4 TABLET ORAL at 06:30

## 2022-11-07 RX ADMIN — SODIUM CHLORIDE, PRESERVATIVE FREE 10 ML: 5 INJECTION INTRAVENOUS at 05:02

## 2022-11-07 RX ADMIN — DEXAMETHASONE 4 MG: 4 TABLET ORAL at 23:03

## 2022-11-07 RX ADMIN — DEXAMETHASONE 4 MG: 4 TABLET ORAL at 11:19

## 2022-11-07 RX ADMIN — GABAPENTIN 100 MG: 300 CAPSULE ORAL at 23:03

## 2022-11-07 RX ADMIN — TIZANIDINE 2 MG: 2 TABLET ORAL at 23:03

## 2022-11-07 RX ADMIN — CARVEDILOL 3.12 MG: 3.12 TABLET, FILM COATED ORAL at 08:13

## 2022-11-07 RX ADMIN — ALLOPURINOL 100 MG: 100 TABLET ORAL at 08:12

## 2022-11-07 RX ADMIN — ACETAMINOPHEN 650 MG: 325 TABLET ORAL at 11:19

## 2022-11-07 RX ADMIN — LEVOTHYROXINE SODIUM 125 MCG: 0.12 TABLET ORAL at 08:12

## 2022-11-07 RX ADMIN — ENOXAPARIN SODIUM 30 MG: 100 INJECTION SUBCUTANEOUS at 08:14

## 2022-11-07 RX ADMIN — SODIUM CHLORIDE, PRESERVATIVE FREE 10 ML: 5 INJECTION INTRAVENOUS at 23:06

## 2022-11-07 RX ADMIN — CARVEDILOL 3.12 MG: 3.12 TABLET, FILM COATED ORAL at 16:09

## 2022-11-07 RX ADMIN — ACETAMINOPHEN 650 MG: 325 TABLET ORAL at 06:30

## 2022-11-07 RX ADMIN — TIZANIDINE 2 MG: 2 TABLET ORAL at 08:13

## 2022-11-07 RX ADMIN — PANTOPRAZOLE SODIUM 40 MG: 40 TABLET, DELAYED RELEASE ORAL at 08:12

## 2022-11-07 RX ADMIN — TIZANIDINE 2 MG: 2 TABLET ORAL at 16:09

## 2022-11-07 RX ADMIN — SODIUM CHLORIDE, PRESERVATIVE FREE 10 ML: 5 INJECTION INTRAVENOUS at 23:05

## 2022-11-07 RX ADMIN — METHADONE HYDROCHLORIDE 60 MG: 10 CONCENTRATE ORAL at 08:13

## 2022-11-07 RX ADMIN — DOXAZOSIN MESYLATE 2 MG: 2 TABLET ORAL at 08:13

## 2022-11-07 RX ADMIN — TAMSULOSIN HYDROCHLORIDE 0.4 MG: 0.4 CAPSULE ORAL at 08:13

## 2022-11-07 RX ADMIN — OXYCODONE 15 MG: 5 TABLET ORAL at 08:13

## 2022-11-07 RX ADMIN — COLCHICINE 0.6 MG: 0.6 TABLET, FILM COATED ORAL at 08:17

## 2022-11-07 RX ADMIN — OXYCODONE 15 MG: 5 TABLET ORAL at 14:41

## 2022-11-07 RX ADMIN — CASTOR OIL AND BALSAM, PERU: 788; 87 OINTMENT TOPICAL at 08:17

## 2022-11-07 RX ADMIN — CASTOR OIL AND BALSAM, PERU: 788; 87 OINTMENT TOPICAL at 23:07

## 2022-11-07 RX ADMIN — ACETAMINOPHEN 650 MG: 325 TABLET ORAL at 01:18

## 2022-11-07 NOTE — DISCHARGE INSTRUCTIONS
HOSPITALIST DISCHARGE INSTRUCTIONS    NAME: Elzbieta Wakefield   :  1965   MRN:  [id number]     Date/Time:  2022 11:51 AM    ADMIT DATE: 10/16/2022   DISCHARGE DATE: 2022     Attending Physician: Sebastien Rashid MD      Medications: Per above medication reconciliation.     Pain Management: per above medications    Recommended diet: Cardiac Diet    Recommended activity: Activity as tolerated    Wound care: None    Indwelling devices:  None    Supplemental Oxygen: 2 liters PRN    Required Lab work: Per SNF routine    Glucose management:  None    Code status: Full

## 2022-11-07 NOTE — PROGRESS NOTES
Hospitalist Progress Note    NAME: Brody Jean   :  1965   MRN:  [id number]       Assessment / Plan:  Intractable back pain   D/t T5 and T6 fracture:    MRI 10/24/ 2022  1. Increased subacute T5 partial compression fracture. 2. Unchanged subacute T6 partial compression fracture. Bony retropulsion at T6  causes mild central spinal canal stenosis without cord compression. 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 Talked to neurosurgery Dr. Milagros Lomas, doesn't think he needs surgical intervention as he doesn't have significant weakness of the RLE (RLE power 3/5 , has AKA on the left)  Discussed with Radiology, recommended doing CT thoracic spine, CT reviewed, looks like cement displaced. No treatment as such for this  Pain control with , methadone 60 mg daily, oxycodone PRN. Stop dilaudid  PT/OT recommending IPR. [facility] Tapering decadron (would to 4 mg q12 hours x 3 days, 2 mg q12 hours for 3 days and stop)  Medically ready pending placement     Carbapenem resistant urinary tract infection  History of recent Gross catheter and also straight cath  Urine culture grew Carbapenem resistant Klebsiella pneumonia  ID consulted, continued Gentamicin through 10/28/22, course completed     Suspected aspiration pneumonia  Acute hypoxic respiratory failure  -MRI shows evidence of aspiration pneumonia. Chest x-ray also shows basilar infiltrates with possible effusion  Started empiric antibiotics with cefepime and Flagyl, started 10/25/22  Changed to Cefdinir flagyl, EOT 10/31/22, 7 day course completed. Aspiration precautions. Was evaluated by speech, now on regular diet.   Procal normal  Now weaned off oxygen  CXR 10/28/22 showed L hemithorax opacity possible LLL collapse, L pleural effusion  C/w incentive spirometry  Pulmonary evaluated and signed off  Followup CT in 1 year for 3mm pulm nodule     Possible gout flare - improved  Describes knee pain, similar to previous gout flare  Cw Colchicine now on 0.6 daily  Started allopurinol 100 daily. Was prescribed 300 daily but not taking consistently, so essentially starting over     Chronic pain on methadone  -Continue home methadone. Rest of pain meds as above    Debility secondary to CVA and recent left AKA  -Continue PT OT     Chronic wounds  -Continue wound care     Mild COPD exacerbation  Prn duonebs  S/p prednisone    GERD  Protonix daily     Essential hypertension  Continue PTA Coreg     Hypothyroidism  Continue PTA Synthroid     Left AKA     30.0 - 39.9 Obese / Body mass index is 30.79 kg/m². Code status: Full  Prophylaxis: Lovenox  Recommended Disposition: SNF  BARBARA: Medically ready  Barriers: IPR placement     Subjective:     Chief Complaint / Reason for Physician Visit  Follow up for vertebral fracture  Continues to have back pain. Wants to go to REhab    Review of Systems:  Symptom Y/N Comments  Symptom Y/N Comments   Fever/Chills n   Chest Pain n    Poor Appetite n   Edema n    Cough    Abdominal Pain     Sputum    Joint Pain y Back pain   SOB/BECK    Pruritis/Rash     Nausea/vomit    Tolerating PT/OT     Diarrhea    Tolerating Diet     Constipation    Other       Could NOT obtain due to:      Objective:     VITALS:   Last 24hrs VS reviewed since prior progress note.  Most recent are:  Patient Vitals for the past 24 hrs:   Temp Pulse Resp BP SpO2   11/07/22 0811 98.1 °F (36.7 °C) 65 17 128/84 95 %   11/07/22 0000 -- -- -- (!) 152/65 --   11/06/22 2300 98 °F (36.7 °C) 67 18 (!) 169/70 96 %   11/06/22 1804 98.1 °F (36.7 °C) 65 18 (!) 141/78 98 %   11/06/22 1241 -- 71 -- 114/67 --       Intake/Output Summary (Last 24 hours) at 11/7/2022 1152  Last data filed at 11/7/2022 0811  Gross per 24 hour Intake 500 ml   Output 2150 ml   Net -1650 ml        I had a face to face encounter and independently examined this patient on 11/7/2022, as outlined below:  PHYSICAL EXAM:  General: Awake, No acute distress  EENT:  EOMI. Anicteric sclerae. MMM  Resp:  CTA bilaterally, no wheezing or rales. No accessory muscle use  CV:  Regular  rhythm,  No edema  GI:  Soft, Non distended, Non tender. +Bowel sounds  Neurologic:  Alert and oriented X 3, normal speech,   Psych:   Not anxious nor agitated  Ext: Power 3/5 on RLE, left AKA (has prior weakness)  Power 5/5 on RUE and has residual weakness on left    Reviewed most current lab test results and cultures  YES  Reviewed most current radiology test results   YES  Review and summation of old records today    NO  Reviewed patient's current orders and MAR    YES  PMH/ reviewed - no change compared to H&P  ________________________________________________________________________  Care Plan discussed with:    Comments   Patient y    Family      RN y    Care Manager     Consultant                       y Multidiciplinary team rounds were held today with , nursing, pharmacist and clinical coordinator. Patient's plan of care was discussed; medications were reviewed and discharge planning was addressed. ________________________________________________________________________  Total NON critical care TIME: 27  Minutes    Total CRITICAL CARE TIME Spent:   Minutes non procedure based      Comments   >50% of visit spent in counseling and coordination of care     ________________________________________________________________________  Edin Palomares MD     Procedures: see electronic medical records for all procedures/Xrays and details which were not copied into this note but were reviewed prior to creation of Plan. LABS:  I reviewed today's most current labs and imaging studies.   Pertinent labs include:  No results for input(s): WBC, HGB, HCT, PLT, HGBEXT, HCTEXT, PLTEXT, HGBEXT, HCTEXT, PLTEXT in the last 72 hours. No results for input(s): NA, K, CL, CO2, GLU, BUN, CREA, CA, MG, PHOS, ALB, TBIL, TBILI, ALT, INR, INREXT, INREXT in the last 72 hours.     No lab exists for component: SGOT      Signed: Tasia Rivero MD

## 2022-11-07 NOTE — PROGRESS NOTES
Problem: Self Care Deficits Care Plan (Adult)  Goal: *Acute Goals and Plan of Care (Insert Text)  Description: FUNCTIONAL STATUS PRIOR TO ADMISSION: had L AKA at [facility] 5 weeks ago, female friend has been assisting with sliding board transfers or stand pivot transfers to and from power wheelchair or BSC SBA, sponge bathing with bath wipes with assist for RLE, able to perform UB ADLS with set up, assist with toileting and LB dressing, had recently stopped using grace lift due to improved independence with transfers, going to OP PT/OT at [facility] and they were setting up aide services, pt needs a new hospital bed due to it being in disrepair, uses power chair for mobility independently with RUE as LUE is non functional from old [address]: The patient lived alone with a friend to provide assistance. Pt reports his friend has been assisting since d/c from [facility] and that she may not be able to continue to assist long term    Occupational Therapy Goals:  Initiated 10/17/2022  Weekly reevaluation/reevaluation post procedure 11/1/2022: no goalsl met-Continue below:  1. Patient will perform grooming seated with good balance with noe technique and supervision implements provided within 7 days. 2. Patient will perform upper body dressing with supervision/set-up within 7 days. 3. Patient will perform toileting with moderate assistance  within 7 days. 4. Patient will perform sponge bath seated with minimal assist within 7 days. 5. Patient will transfer from drop arm bedside commode or drop arm recliner with moderate assistance with best technique within 7 days.       Outcome: Progressing Towards Goal    OCCUPATIONAL THERAPY TREATMENT  Patient: Chandana Che (89 y.o. male)  Date: 11/7/2022  Diagnosis: COPD with acute exacerbation ([facility] [address] [J44.1] <principal problem not specified>      Precautions: Fall, Contact (ESBL wound)  Chart, occupational therapy assessment, plan of care, and goals were reviewed. ASSESSMENT  Patient continues with skilled OT services and is slowly progressing towards goals. Pt noted with good engagement with bed level ADL bathing/dressing this date; however, progressive c/o R shoulder pain limited pt's functional independence, with pt initially attempting EOB ADLs; however, requesting return to inclined, finishing ADLs long-sitting. Pt verbalized desires for IPR upon discharge. Skilled OT to continue to follow during acute hospitalization. Current Level of Function Impacting Discharge (ADLs): Max A    Other factors to consider for discharge: Max A         PLAN :  Patient continues to benefit from skilled intervention to address the above impairments. Continue treatment per established plan of care to address goals. Recommend with staff: Frequent positional changes, bed level ADL engagement    Recommend next OT session: POC progression    Recommendation for discharge: (in order for the patient to meet his/her long term goals)  Therapy 3 hours per day 5-7 days per week    This discharge recommendation:  Has been made in collaboration with the attending provider and/or case management    IF patient discharges home will need the following DME: TBD       SUBJECTIVE:   Patient stated Im glad to see you today.     OBJECTIVE DATA SUMMARY:   Cognitive/Behavioral Status:  Neurologic State: Alert  Orientation Level: Oriented X4  Cognition: Appropriate decision making  Perception: Appears intact  Perseveration: No perseveration noted  Safety/Judgement: Awareness of environment    Functional Mobility and Transfers for ADLs:  Bed Mobility:  Rolling: Contact guard assistance  Supine to Sit: Contact guard assistance; Additional time;Bed Modified (head of bed elevated)  Sit to Supine: Minimum assistance    Balance:  Sitting: Impaired  Sitting - Static: Good (unsupported)  Sitting - Dynamic: Fair (occasional)    ADL Intervention:  Grooming  Grooming Assistance: Set-up  Position Performed: Long sitting on bed  Washing Face: Set-up  Brushing Teeth: Set-up  Shaving: Set-up (Electric trimmers)    Upper Body Bathing  Bathing Assistance: Maximum assistance  Position Performed: Long sitting on bed    Type of Bath: Chlorhexidine (CHG)    Lower Body Bathing  Bathing Assistance: Maximum assistance  Perineal  : Maximum assistance  Position Performed: Supine (and L sidelying)  Lower Body : Maximum assistance  Position Performed: Long sitting on bed    Upper Body [address]: Minimum  assistance    Cognitive Retraining  Safety/Judgement: Awareness of environment    Pain:  10/10 c/o R shoulder pain; RN aware and following    Activity Tolerance:   Fair and requires rest breaks    After treatment patient left in no apparent distress:   Supine in bed, Call bell within reach, Bed / chair alarm activated, and Side rails x 3    COMMUNICATION/COLLABORATION:   The patients plan of care was discussed with: Registered nurse.      Sanjeev Means OT  Time Calculation: 39 mins

## 2022-11-08 PROCEDURE — 74011250637 HC RX REV CODE- 250/637: Performed by: STUDENT IN AN ORGANIZED HEALTH CARE EDUCATION/TRAINING PROGRAM

## 2022-11-08 PROCEDURE — 74011250636 HC RX REV CODE- 250/636: Performed by: STUDENT IN AN ORGANIZED HEALTH CARE EDUCATION/TRAINING PROGRAM

## 2022-11-08 PROCEDURE — 74011250637 HC RX REV CODE- 250/637: Performed by: INTERNAL MEDICINE

## 2022-11-08 PROCEDURE — 74011000250 HC RX REV CODE- 250: Performed by: STUDENT IN AN ORGANIZED HEALTH CARE EDUCATION/TRAINING PROGRAM

## 2022-11-08 PROCEDURE — 65270000046 HC RM TELEMETRY

## 2022-11-08 PROCEDURE — 94760 N-INVAS EAR/PLS OXIMETRY 1: CPT

## 2022-11-08 PROCEDURE — 97530 THERAPEUTIC ACTIVITIES: CPT

## 2022-11-08 RX ORDER — OXYCODONE HYDROCHLORIDE 5 MG/1
20 TABLET ORAL
Status: DISCONTINUED | OUTPATIENT
Start: 2022-11-08 | End: 2022-11-10

## 2022-11-08 RX ADMIN — DEXAMETHASONE 4 MG: 4 TABLET ORAL at 20:40

## 2022-11-08 RX ADMIN — MELATONIN 3 MG: at 22:06

## 2022-11-08 RX ADMIN — ACETAMINOPHEN 650 MG: 325 TABLET ORAL at 06:30

## 2022-11-08 RX ADMIN — SODIUM CHLORIDE, PRESERVATIVE FREE 10 ML: 5 INJECTION INTRAVENOUS at 22:00

## 2022-11-08 RX ADMIN — SODIUM CHLORIDE, PRESERVATIVE FREE 10 ML: 5 INJECTION INTRAVENOUS at 06:30

## 2022-11-08 RX ADMIN — DEXAMETHASONE 4 MG: 4 TABLET ORAL at 08:22

## 2022-11-08 RX ADMIN — TAMSULOSIN HYDROCHLORIDE 0.4 MG: 0.4 CAPSULE ORAL at 08:22

## 2022-11-08 RX ADMIN — ACETAMINOPHEN 650 MG: 325 TABLET ORAL at 01:29

## 2022-11-08 RX ADMIN — PANTOPRAZOLE SODIUM 40 MG: 40 TABLET, DELAYED RELEASE ORAL at 06:30

## 2022-11-08 RX ADMIN — GABAPENTIN 100 MG: 300 CAPSULE ORAL at 17:33

## 2022-11-08 RX ADMIN — ACETAMINOPHEN 650 MG: 325 TABLET ORAL at 17:33

## 2022-11-08 RX ADMIN — CARVEDILOL 3.12 MG: 3.12 TABLET, FILM COATED ORAL at 17:33

## 2022-11-08 RX ADMIN — ALLOPURINOL 100 MG: 100 TABLET ORAL at 08:22

## 2022-11-08 RX ADMIN — OXYCODONE 20 MG: 5 TABLET ORAL at 17:33

## 2022-11-08 RX ADMIN — GABAPENTIN 100 MG: 300 CAPSULE ORAL at 08:22

## 2022-11-08 RX ADMIN — CASTOR OIL AND BALSAM, PERU: 788; 87 OINTMENT TOPICAL at 09:00

## 2022-11-08 RX ADMIN — ACETAMINOPHEN 650 MG: 325 TABLET ORAL at 12:27

## 2022-11-08 RX ADMIN — CASTOR OIL AND BALSAM, PERU: 788; 87 OINTMENT TOPICAL at 22:07

## 2022-11-08 RX ADMIN — OXYCODONE 20 MG: 5 TABLET ORAL at 20:40

## 2022-11-08 RX ADMIN — LEVOTHYROXINE SODIUM 125 MCG: 0.12 TABLET ORAL at 06:30

## 2022-11-08 RX ADMIN — TIZANIDINE 2 MG: 2 TABLET ORAL at 17:33

## 2022-11-08 RX ADMIN — ACETAMINOPHEN 650 MG: 325 TABLET ORAL at 23:57

## 2022-11-08 RX ADMIN — GABAPENTIN 100 MG: 300 CAPSULE ORAL at 22:06

## 2022-11-08 RX ADMIN — CARVEDILOL 3.12 MG: 3.12 TABLET, FILM COATED ORAL at 08:22

## 2022-11-08 RX ADMIN — TIZANIDINE 2 MG: 2 TABLET ORAL at 22:06

## 2022-11-08 RX ADMIN — DOXAZOSIN MESYLATE 2 MG: 2 TABLET ORAL at 08:22

## 2022-11-08 RX ADMIN — METHADONE HYDROCHLORIDE 60 MG: 10 CONCENTRATE ORAL at 08:21

## 2022-11-08 RX ADMIN — OXYCODONE 20 MG: 5 TABLET ORAL at 23:57

## 2022-11-08 RX ADMIN — TIZANIDINE 2 MG: 2 TABLET ORAL at 08:22

## 2022-11-08 RX ADMIN — SODIUM CHLORIDE, PRESERVATIVE FREE 10 ML: 5 INJECTION INTRAVENOUS at 15:33

## 2022-11-08 RX ADMIN — SODIUM CHLORIDE, PRESERVATIVE FREE 10 ML: 5 INJECTION INTRAVENOUS at 15:32

## 2022-11-08 NOTE — PROGRESS NOTES
Bedside and Verbal shift change report given to Lindsey  RN (oncoming nurse) by Sheron RN (offgoing nurse). Report included the following information SBAR, Kardex, and MAR.

## 2022-11-08 NOTE — PROGRESS NOTES
Problem: Mobility Impaired (Adult and Pediatric)  Goal: *Acute Goals and Plan of Care (Insert Text)  Description: FUNCTIONAL STATUS PRIOR TO ADMISSION: Pt lives at home, had been alone but recently since amputation his friend has been staying with him. Pt pt's request, talked with friend on phone to get status PTA. She stated pt was functioning at w/c level with an electric scooter; needed SBA for either pivot transfer or sliding board transfer (does have grace but she states he did better without); needed help with dressing and uses wipes for bathing because he could not get onto shower chair any longer. She states he was going to OPPT/OT at [facility]. She states they were apparently working on getting him a care aide. Pt also states that he has a  for his L AKA (5 weeks ago) but it no longer fits. He was unable to fill in where he got it. HOME SUPPORT PRIOR TO ADMISSION: The patient lived alone with friend to provide assistance. Physical Therapy Goals  Initiated 10/17/2022  1. Patient will move from supine to sit and sit to supine , scoot up and down, and roll side to side in bed with modified independence within 7 day(s). 2.  Patient will transfer from bed to chair and chair to bed with minimal assistance/contact guard assist using the least restrictive device within 7 day(s). 3.  Patient will show good sitting balance static and dynamic for safe bed mobility and transfers within 7 days. Physical Therapy Goal  Reassessed 11/8/2022 goals appropriate for next 7 days  Re-Assessed 10/28/2022  1. Patient will move from supine to sit and sit to supine , scoot up and down, and roll side to side in bed with minimal assistance within 7 day(s). 2.  Patient will transfer from bed to chair and chair to bed with moderate assist using the least restrictive device within 7 day(s). 3.  Patient will show good sitting balance static and dynamic for safe bed mobility and transfers within 7 days.   4.  Patient will demonstrate 5 exercises without verbal cueing to improve UE/LE ROM and strength within 7 days. Outcome: Progressing Towards Goal   PHYSICAL THERAPY TREATMENT: WEEKLY REASSESSMENT  Patient: Marisa Patient (60 y.o. male)  Date: 11/8/2022  Primary Diagnosis: COPD with acute exacerbation (HCC) [J44.1]       Precautions:   Fall, Contact (ESBL wound)      ASSESSMENT  Patient continues with skilled PT services and is progressing slowly towards goals. Pt received in bed, agreeable to PT session with coaxing. Bed mobility is progressing slightly but. pt continues to have great difficulty standing, with inability to clear his buttocks without total A. Reports he was transferring out/in bed via sliding board and at times via a stand pivot transfer. He is very particular regarding how things are done and set up. Sliding board transfer in/out bed not a good option due to pt's skin integrity. Pt continues to report that he needs his     Patient's progression toward goals since last assessment: improved scooting eob, improved bed mobiity    Current Level of Function Impacting Discharge (mobility/balance): unable to stand/transfer OOB    Other factors to consider for discharge: : multiple co-morbidities, decreased strength and endurance         PLAN :  Goals have been updated based on progression since last assessment. Patient continues to benefit from skilled intervention to address the above impairments. Recommendations and Planned Interventions: bed mobility training, transfer training, therapeutic exercises, patient and family training/education, and therapeutic activities      Frequency/Duration: Patient will be followed by physical therapy:  3 times a week to address goals.     Recommendation for discharge: (in order for the patient to meet his/her long term goals)  Therapy 3 hours per day 5-7 days per week    This discharge recommendation:  Has been made in collaboration with the attending provider and/or case management    IF patient discharges home will need the following DME: patient owns DME required for discharge         SUBJECTIVE:   Patient stated I'll just go home.     OBJECTIVE DATA SUMMARY:   HISTORY:    Past Medical History:   Diagnosis Date    Aneurysm ([address]     (with stroke) brain    Bladder tumor     Cancer ([address]     bladder CA    Chronic pain     Family history of bladder cancer     GERD (gastroesophageal reflux disease)     Gout     History of vascular access device 04/25/2019    [facility] VAT 4 FR MIDLINE R Cephalic Limited access    History of vascular access device 04/26/2019    4 fr Midline right Cephalic midline access    Hypercholesterolemia     Hypertension     Lesion of bladder     Seizures ([address]     Stroke ([address] 2006    left sided weakness    Thromboembolus ([address]     left leg    Thyroid disease     TIA (transient ischemic attack) 2012     Past Surgical History:   Procedure Laterality Date    HX ORTHOPAEDIC      R arthroscopy    HX OTHER SURGICAL      x2 L foot    HX UROLOGICAL  04/20/2018    Cystoscopy, TURBT (greater than 5 cm resected) Dr. Maurer, [facility].     IR KYPHOPLASTY THORACIC  10/27/2022    KNEE ARTHROSCP HARV      left knee    TRANSURETHRAL RESEC BLADDER NECK  08/10/2018       Personal factors and/or comorbidities impacting plan of care: pain, skin integrity    Home Situation  Home Environment: Apartment  # Steps to Enter: 0  One/Two Story Residence: One story  Living Alone: Yes (friend with him right now assisting with transfers and dressing)  Support Systems: Friend/Neighbor  Patient Expects to be Discharged to[de-identified] Rehab hospital/unit acute  Current DME Used/Available at Home: Wheelchair, power, Hospital bed, Shower chair, Wheelchair, Commode, bedside (sliding board, electric scooter, grace)  Tub or Shower Type: Tub/Shower combination    EXAMINATION/PRESENTATION/DECISION MAKING:   Critical Behavior:  Neurologic State: Alert  Orientation Level: Oriented X4  Cognition: Appropriate decision making  Safety/Judgement: Awareness of environment  Hearing: Auditory  Auditory Impairment: None  Range Of Motion:  AROM: Generally decreased, functional (LUE grossly decreased, non functional)                       Strength:    Strength: Generally decreased, functional (LUE, Grossly decreased non functional)                    Tone & Sensation:   Tone: Abnormal              Sensation: Impaired               Coordination:  Coordination: Generally decreased, functional     Functional Mobility:  Bed Mobility:  Rolling: Contact guard assistance (To left)  Supine to Sit: Minimum assistance (hob elevated to the max)  Sit to Supine: Minimum assistance;Contact guard assistance  Scooting: Stand-by assistance (total A in bed with use of hercules bed)        Balance:   Sitting: Impaired  Sitting - Static: Good (unsupported)  Sitting - Dynamic: Not tested  Standing:  (Not tested)            Pain Rating:  R shoulder, R shoulder blade, R ribs, not quantified    Activity Tolerance:   Fair and limited by pain    After treatment patient left in no apparent distress:   Supine in bed, Call bell within reach, Bed / chair alarm activated, and Side rails x 3    COMMUNICATION/EDUCATION:   The patients plan of care was discussed with: Registered nurse. Fall prevention education was provided and the patient/caregiver indicated understanding., Patient/family have participated as able in goal setting and plan of care. , and Patient/family agree to work toward stated goals and plan of care.     Thank you for this referral.  Bree Acosta, PT   Time Calculation: 24 mins

## 2022-11-08 NOTE — PROGRESS NOTES
Transition of Care Plan:    RUR:13%  Disposition: [facility] - awaitng progression with PT/OT - will also need authorization  Follow up appointments: Francisco Garcia friend states he has a new PCP she is arranging  DME needed: Hospital Bed, LVM with [employer] and [place] at Discharge: Bridget Bustam[address] 1948 or means to access home:    patient    IM Medicare Letter:n/a  Is patient a [place] and connected with the [place]? N/a  If yes, was [employer] transfer form completed and [facility] notified? Caregiver Contact:  Sebastien Urbina [phone]  Discharge Caregiver contacted prior to discharge? Per patient  Care Conference needed?:      no    [facility] following and awaiting PT/OT notes at this time. SHERRI spoke with Kirk Denver with [facility] at [phone].  JUVENCIO Martníez

## 2022-11-08 NOTE — PROGRESS NOTES
Hospitalist Progress Note    NAME: Cheng Soliz   :  1965   MRN:  [id number]       Assessment / Plan:    ntractable back pain   D/t T5 and T6 fracture:    MRI 10/24/ 2022  1. Increased subacute T5 partial compression fracture. 2. Unchanged subacute T6 partial compression fracture. Bony retropulsion at T6  causes mild central spinal canal stenosis without cord compression. 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 Talked to neurosurgery Dr. Wendi Moura, doesn't think he needs surgical intervention as he doesn't have significant weakness of the RLE (RLE power 3/5 , has AKA on the left)  Discussed with Radiology, recommended doing CT thoracic spine, CT reviewed, looks like cement displaced. No treatment as such for this  Pain control with , methadone 60 mg daily, oxycodone PRN. Stop dilaudid  PT/OT recommending IPR. [facility] Tapering decadron (would to 4 mg q12 hours x 3 days, 2 mg q12 hours for 3 days and stop)  Medically ready pending placement     Carbapenem resistant urinary tract infection  History of recent Gross catheter and also straight cath  Urine culture grew Carbapenem resistant Klebsiella pneumonia  ID consulted, continued Gentamicin through 10/28/22, course completed     Suspected aspiration pneumonia  Acute hypoxic respiratory failure  -MRI shows evidence of aspiration pneumonia. Chest x-ray also shows basilar infiltrates with possible effusion  Started empiric antibiotics with cefepime and Flagyl, started 10/25/22  Changed to Cefdinir flagyl, EOT 10/31/22, 7 day course completed. Aspiration precautions. Was evaluated by speech, now on regular diet.   Procal normal  Now weaned off oxygen  CXR 10/28/22 showed L hemithorax opacity possible LLL collapse, L pleural effusion  C/w incentive spirometry  Pulmonary evaluated and signed off  Followup CT in 1 year for 3mm pulm nodule     Possible gout flare - improved  Describes knee pain, similar to previous gout flare  Cw Colchicine now on 0.6 daily  Started allopurinol 100 daily. Was prescribed 300 daily but not taking consistently, so essentially starting over     Chronic pain on methadone  -Continue home methadone. Rest of pain meds as above    Debility secondary to CVA and recent left AKA  -Continue PT OT     Chronic wounds  -Continue wound care     Mild COPD exacerbation  Prn duonebs  S/p prednisone    GERD  Protonix daily     Essential hypertension  Continue PTA Coreg     Hypothyroidism  Continue PTA Synthroid     Left AKA     30.0 - 39.9 Obese / Body mass index is 30.79 kg/m². Code status: Full  Prophylaxis: Lovenox  Recommended Disposition: SNF  BARBARA: Medically ready  Barriers: IPR placement    Subjective:     Chief Complaint / Reason for Physician Visit  Follow up for vertebral fracture  \"The back pain is still real severe\"  Wants to go to rehab  Asking to increase pain meds  \"Not sure how I can live with this\"    Review of Systems:  Symptom Y/N Comments  Symptom Y/N Comments   Fever/Chills n   Chest Pain n    Poor Appetite n   Edema n    Cough n   Abdominal Pain     Sputum    Joint Pain y Back pain   SOB/BECK n   Pruritis/Rash     Nausea/vomit n   Tolerating PT/OT     Diarrhea n   Tolerating Diet y    Constipation    Other       Could NOT obtain due to:      Objective:     VITALS:   Last 24hrs VS reviewed since prior progress note.  Most recent are:  Patient Vitals for the past 24 hrs:   Temp Pulse Resp BP SpO2   11/08/22 1459 97.4 °F (36.3 °C) (!) 54 18 (!) 182/97 94 %   11/08/22 1156 97.8 °F (36.6 °C) (!) 51 18 (!) 145/96 93 %   11/08/22 0817 -- 61 18 (!) 151/84 95 %   11/07/22 2321 98 °F (36.7 °C) 65 16 (!) 172/92 91 %         Intake/Output Summary (Last 24 hours) at 11/8/2022 [address] [place] filed at 11/8/2022 0912  Gross per 24 hour   Intake 840 ml   Output 2600 ml   Net -1760 ml          I had a face to face encounter and independently examined this patient on 11/8/2022, as outlined below:  PHYSICAL EXAM:  General: Awake, No acute distress  EENT:  EOMI. Anicteric sclerae. MMM  Resp:  CTA bilaterally, no wheezing or rales. No accessory muscle use  CV:  Regular  rhythm,  No edema  GI:  Soft, Non distended, Non tender. +Bowel sounds  Neurologic:  Alert and oriented X 3, normal speech,   Psych:   Not anxious nor agitated  Ext: Power 4/5 on RLE, left AKA (has prior weakness)  Power 5/5 on RUE and has residual weakness on left    Reviewed most current lab test results and cultures  YES  Reviewed most current radiology test results   YES  Review and summation of old records today    NO  Reviewed patient's current orders and MAR    YES  PMH/SH reviewed - no change compared to H&P  ________________________________________________________________________  Care Plan discussed with:    Comments   Patient y    Family      RN y    Care Manager     Consultant                       y Multidiciplinary team rounds were held today with , nursing, pharmacist and clinical coordinator. Patient's plan of care was discussed; medications were reviewed and discharge planning was addressed. ________________________________________________________________________        Comments   >50% of visit spent in counseling and coordination of care     ________________________________________________________________________  Sugar Santos MD     Procedures: see electronic medical records for all procedures/Xrays and details which were not copied into this note but were reviewed prior to creation of Plan. LABS:  I reviewed today's most current labs and imaging studies.   Pertinent labs include:  No results for input(s): WBC, HGB, HCT, PLT, HGBEXT, HCTEXT, PLTEXT, HGBEXT, HCTEXT, PLTEXT in the last 72 hours. No results for input(s): NA, K, CL, CO2, GLU, BUN, CREA, CA, MG, PHOS, ALB, TBIL, TBILI, ALT, INR, INREXT, INREXT in the last 72 hours.     No lab exists for component: SGOT      Signed: Argelia Vallecillo MD

## 2022-11-09 LAB
COVID-19 RAPID TEST, COVR: NOT DETECTED
SOURCE, COVRS: NORMAL

## 2022-11-09 PROCEDURE — 97535 SELF CARE MNGMENT TRAINING: CPT

## 2022-11-09 PROCEDURE — 74011000250 HC RX REV CODE- 250: Performed by: STUDENT IN AN ORGANIZED HEALTH CARE EDUCATION/TRAINING PROGRAM

## 2022-11-09 PROCEDURE — 74011250637 HC RX REV CODE- 250/637: Performed by: STUDENT IN AN ORGANIZED HEALTH CARE EDUCATION/TRAINING PROGRAM

## 2022-11-09 PROCEDURE — 74011250637 HC RX REV CODE- 250/637: Performed by: INTERNAL MEDICINE

## 2022-11-09 PROCEDURE — 65270000046 HC RM TELEMETRY

## 2022-11-09 PROCEDURE — 74011250636 HC RX REV CODE- 250/636: Performed by: GENERAL ACUTE CARE HOSPITAL

## 2022-11-09 PROCEDURE — 87635 SARS-COV-2 COVID-19 AMP PRB: CPT

## 2022-11-09 PROCEDURE — 74011250636 HC RX REV CODE- 250/636: Performed by: STUDENT IN AN ORGANIZED HEALTH CARE EDUCATION/TRAINING PROGRAM

## 2022-11-09 PROCEDURE — 94760 N-INVAS EAR/PLS OXIMETRY 1: CPT

## 2022-11-09 RX ADMIN — OXYCODONE 20 MG: 5 TABLET ORAL at 22:31

## 2022-11-09 RX ADMIN — OXYCODONE 20 MG: 5 TABLET ORAL at 07:00

## 2022-11-09 RX ADMIN — CASTOR OIL AND BALSAM, PERU: 788; 87 OINTMENT TOPICAL at 10:18

## 2022-11-09 RX ADMIN — CARVEDILOL 3.12 MG: 3.12 TABLET, FILM COATED ORAL at 17:10

## 2022-11-09 RX ADMIN — SODIUM CHLORIDE, PRESERVATIVE FREE 10 ML: 5 INJECTION INTRAVENOUS at 22:38

## 2022-11-09 RX ADMIN — DOXAZOSIN MESYLATE 2 MG: 2 TABLET ORAL at 10:14

## 2022-11-09 RX ADMIN — HYDRALAZINE HYDROCHLORIDE 10 MG: 20 INJECTION INTRAMUSCULAR; INTRAVENOUS at 01:11

## 2022-11-09 RX ADMIN — SODIUM CHLORIDE, PRESERVATIVE FREE 10 ML: 5 INJECTION INTRAVENOUS at 07:00

## 2022-11-09 RX ADMIN — SODIUM CHLORIDE, PRESERVATIVE FREE 10 ML: 5 INJECTION INTRAVENOUS at 15:47

## 2022-11-09 RX ADMIN — METHADONE HYDROCHLORIDE 60 MG: 10 CONCENTRATE ORAL at 10:14

## 2022-11-09 RX ADMIN — ACETAMINOPHEN 650 MG: 325 TABLET ORAL at 09:00

## 2022-11-09 RX ADMIN — TIZANIDINE 2 MG: 2 TABLET ORAL at 15:44

## 2022-11-09 RX ADMIN — GABAPENTIN 100 MG: 300 CAPSULE ORAL at 15:44

## 2022-11-09 RX ADMIN — PANTOPRAZOLE SODIUM 40 MG: 40 TABLET, DELAYED RELEASE ORAL at 07:00

## 2022-11-09 RX ADMIN — GABAPENTIN 100 MG: 300 CAPSULE ORAL at 22:31

## 2022-11-09 RX ADMIN — COLCHICINE 0.6 MG: 0.6 TABLET, FILM COATED ORAL at 10:26

## 2022-11-09 RX ADMIN — OXYCODONE 20 MG: 5 TABLET ORAL at 10:14

## 2022-11-09 RX ADMIN — ACETAMINOPHEN 650 MG: 325 TABLET ORAL at 15:44

## 2022-11-09 RX ADMIN — ENOXAPARIN SODIUM 30 MG: 100 INJECTION SUBCUTANEOUS at 09:00

## 2022-11-09 RX ADMIN — CASTOR OIL AND BALSAM, PERU: 788; 87 OINTMENT TOPICAL at 22:33

## 2022-11-09 RX ADMIN — LEVOTHYROXINE SODIUM 125 MCG: 0.12 TABLET ORAL at 07:00

## 2022-11-09 RX ADMIN — CARVEDILOL 3.12 MG: 3.12 TABLET, FILM COATED ORAL at 10:14

## 2022-11-09 RX ADMIN — GABAPENTIN 100 MG: 300 CAPSULE ORAL at 10:14

## 2022-11-09 RX ADMIN — OXYCODONE 20 MG: 5 TABLET ORAL at 15:44

## 2022-11-09 RX ADMIN — ACETAMINOPHEN 650 MG: 325 TABLET ORAL at 17:10

## 2022-11-09 RX ADMIN — TIZANIDINE 2 MG: 2 TABLET ORAL at 10:14

## 2022-11-09 RX ADMIN — TIZANIDINE 2 MG: 2 TABLET ORAL at 22:31

## 2022-11-09 RX ADMIN — ALLOPURINOL 100 MG: 100 TABLET ORAL at 10:14

## 2022-11-09 RX ADMIN — TAMSULOSIN HYDROCHLORIDE 0.4 MG: 0.4 CAPSULE ORAL at 10:14

## 2022-11-09 RX ADMIN — MELATONIN 3 MG: at 22:31

## 2022-11-09 NOTE — PROGRESS NOTES
Problem: Self Care Deficits Care Plan (Adult)  Goal: *Acute Goals and Plan of Care (Insert Text)  Description: FUNCTIONAL STATUS PRIOR TO ADMISSION: had L AKA at [facility] 5 weeks ago, female friend has been assisting with sliding board transfers or stand pivot transfers to and from power wheelchair or BSC SBA, sponge bathing with bath wipes with assist for RLE, able to perform UB ADLS with set up, assist with toileting and LB dressing, had recently stopped using grace lift due to improved independence with transfers, going to OP PT/OT at [facility] [place], [facility] and they were setting up aide services, pt needs a new hospital bed due to it being in disrepair, uses power chair for mobility independently with RUE as LUE is non functional from old [address]: The patient lived alone with a friend to provide assistance. Pt reports his friend has been assisting since d/c from [address] [place] [address] and that she may not be able to continue to assist long term    Occupational Therapy Goals:  Initiated 10/17/2022  Weekly reevaluation/reevaluation post procedure 11/1/2022: no goals met. Goals reviewed on 11/9/22 during weekly re-assessment and updated as per below:  1. Patient will perform grooming seated with good balance with noe technique and supervision implements provided within 7 days. - not met, continue this goal  2. Patient will perform upper body dressing with supervision/set-up within 7 days. - not met, continue this goal  3. Patient will perform toileting with moderate assistance  within 7 days. - not met, continue this goal  4. Patient will perform sponge bath seated with minimal assist within 7 days. - not met, continue this goal    5. Patient will transfer from drop arm bedside commode or drop arm recliner with moderate assistance with best technique within 7 days.  - not met, continue this goal    Outcome: Not Progressing Towards Goal       OCCUPATIONAL THERAPY WEEKLY RE-ASSESSMENT  Date: 11/9/2022  Primary Diagnosis: COPD with acute exacerbation (HCC) [J44.1]       Precautions:  Fall, Contact (ESBL wound)    ASSESSMENT  Patient continues with skilled OT services and is not progressing towards goals. Pt presented in supine and agreeable to therapy session. Pt just received lunch tray and requested assist for opening food packages/cutlery. Pt req'd maxA for cutting, spreading, and opening packages. Pt requested wash cloth for brief grooming task while in long sitting with setup assist only. Pt refused bed mobility tasks and all out of bed mobility activities. Continue OT pending pt's motivation to actively participate in therapy. Functional Outcome Measure: The patient scored 15/100 on the Barthel Index outcome measure which is indicative of totally dependent for ADLs. Current Level of Function Impacting Discharge (ADLs): maxA overall     Other factors to consider for discharge: encourage active participation in therapy      PLAN :  Patient continues to benefit from skilled intervention to address the above impairments. Continue treatment per established plan of care to address goals. Recommendation for discharge: (in order for the patient to meet his/her long term goals)  Therapy 3 hours per day 5-7 days per week    This discharge recommendation:  Has been made in collaboration with the attending provider and/or case management    IF patient discharges home will need the following DME: TBD       SUBJECTIVE:   Patient stated I'm hungry. I want to eat.     OBJECTIVE DATA SUMMARY:   HISTORY:   Past Medical History:   Diagnosis Date    Aneurysm ([facility]     (with stroke) brain    Bladder tumor     Cancer ([facility]     bladder CA    Chronic pain     Family history of bladder cancer     GERD (gastroesophageal reflux disease)     Gout     History of vascular access device 04/25/2019    [facility] VAT 4 FR MIDLINE R Cephalic Limited access    History of vascular access device 04/26/2019    4 fr Midline right Cephalic midline access Hypercholesterolemia     Hypertension     Lesion of bladder     Seizures ([facility]     Stroke [facility]) 2006    left sided weakness    Thromboembolus (HCC)     left leg    Thyroid disease     TIA (transient ischemic attack) 2012     Past Surgical History:   Procedure Laterality Date    HX ORTHOPAEDIC      R arthroscopy    HX OTHER SURGICAL      x2 L foot    HX UROLOGICAL  04/20/2018    Cystoscopy, TURBT (greater than 5 cm resected) Dr. Katherine Hopper, [facility] [place]. IR KYPHOPLASTY THORACIC  10/27/2022    KNEE ARTHROSCP HARV      left knee    TRANSURETHRAL RESEC BLADDER NECK  08/10/2018       Expanded or extensive additional review of patient history:     Home Situation  Home Environment: Apartment  # Steps to Enter: 0  One/Two Story Residence: One story  Living Alone: Yes (friend with him right now assisting with transfers and dressing)  Support Systems: Friend/Neighbor  Patient Expects to be Discharged to[de-identified] Rehab hospital/unit acute  Current DME Used/Available at Home: Wheelchair, power, Hospital bed, Shower chair, Wheelchair, Commode, bedside (sliding board, electric scooter, grace)  Tub or Shower Type: Tub/Shower combination      EXAMINATION OF PERFORMANCE DEFICITS:  Cognitive/Behavioral Status:  Neurologic State: Alert  Orientation Level: Oriented X4  Cognition: Follows commands  Perception: Appears intact  Perseveration: No perseveration noted  Safety/Judgement: Awareness of environment    Hearing:   Auditory  Auditory Impairment: None    Vision/Perceptual:       Corrective Lenses: Reading glasses    Range of Motion:  AROM: Generally decreased, functional (LUE no AROM)  PROM: Generally decreased, functional     Strength:  Strength: Generally decreased, functional       Coordination:  Coordination: Generally decreased, functional  Fine Motor Skills-Upper: Left Impaired;Right Intact    Gross Motor Skills-Upper: Left Impaired;Right Intact    Tone & Sensation:  Tone: Abnormal  Sensation: Impaired     Balance:  Sitting: Impaired  Sitting - Static: Good (unsupported)  Standing:  (deferred)    Functional Mobility and Transfers for ADLs:    ADL Assessment:  Feeding: Setup;Minimum assistance    Oral Facial Hygiene/Grooming: Setup    Bathing: Maximum assistance    Upper Body Dressing: Maximum assistance    Lower Body Dressing: Total assistance    Toileting: Total assistance     ADL Intervention and task modifications:  Feeding  Feeding Assistance: Set-up  Container Management: Maximum assistance  Cutting Food: Maximum assistance    Grooming  Grooming Assistance: Set-up  Position Performed: Long sitting on bed  Washing Face: Set-up  Shaving: Set-up     Lower Body Dressing Assistance  Socks: Total assistance (dependent)    Toileting  Bladder Hygiene: Total assistance (dependent)    Cognitive Retraining  Safety/Judgement: Awareness of environment      Functional Measure:    Barthel Index:  Bathin  Bladder: 5  Bowels: 5  Groomin  Dressin  Feedin  Mobility: 0  Stairs: 0  Toilet Use: 0  Transfer (Bed to Chair and Back): 0  Total: 15/100      The Barthel ADL Index: Guidelines  1. The index should be used as a record of what a patient does, not as a record of what a patient could do. 2. The main aim is to establish degree of independence from any help, physical or verbal, however minor and for whatever reason. 3. The need for supervision renders the patient not independent. 4. A patient's performance should be established using the best available evidence. Asking the patient, friends/relatives and nurses are the usual sources, but direct observation and common sense are also important. However direct testing is not needed. 5. Usually the patient's performance over the preceding 24-48 hours is important, but occasionally longer periods will be relevant. 6. Middle categories imply that the patient supplies over 50 per cent of the effort. 7. Use of aids to be independent is allowed.     Score Interpretation (from [address] [place] [address])  Independent   60-79 Minimally independent   40-59 Partially dependent   20-39 Very dependent   <20 Totally dependent     -Danyelle Good., Barthel, YOON. (1965). Functional evaluation: the Barthel Index. [address] ([address] (1997). The Barthel activities of daily living index: self-reporting versus actual performance in the old (> or = 75 years). Journal of [address] [place] [address] 45(7), [facility], ISAI, Taylor Wilkinson., Kim Lozoya. (1999). Measuring the change in disability after inpatient rehabilitation; comparison of the responsiveness of the Barthel Index and Functional [place] Measure. Journal of Neurology, Neurosurgery, and Psychiatry, 66(4), 852-295. Pato Pacheco, N.J.A, ELENAOR Hines, & Sigrid Oconnor MANGEL. (2004) Assessment of post-stroke quality of life in cost-effectiveness studies: The usefulness of the Barthel Index and the EuroQoL-5D.  Quality of Life Research, 13, 427-43       Pain Rating:  No c/o pain    Activity Tolerance:   Poor    After treatment patient left in no apparent distress:    Supine in bed, Call bell within reach, and Side rails x 3    COMMUNICATION/EDUCATION:   The patients plan of care was discussed with: Physical therapist.         Thank you for this referral.  Jerome Gilbert OT  Time Calculation: 8 mins

## 2022-11-09 NOTE — PROGRESS NOTES
Patient expresses that if he is not accepted by chosen facility by 2:00pm today he wishes to be discharged home today. This nurse has spoke with CM and she is aware of patient desire for discharge at this time. MD also made aware of this.

## 2022-11-09 NOTE — PROGRESS NOTES
Transitions of Care:    RUR- 13%  Disposition: [facility] - authorization started today and patient and Brittany Leung friend made aware      Transition of Care Plan:    RUR:13%  Disposition:[facility]  Follow up appointments:rehab  DME needed:new hospital bed with trapeze and mattress/pad     Hospital Bed, LVM with [employer]  [phone]  Transportation at Discharge: [address] [place] [address] or means to access home:   patient/friend     IM Medicare Letter:n/a  Is patient a [place] and connected with the [place]? N/a  If yes, was [address] transfer form completed and VA notified? Caregiver Contact:Venkata Waldron [phone]  Discharge Caregiver contacted prior to discharge? Per patient  Care Conference needed?:    no    CM notified by Tj Alvarez with [facility] they have medically approved patient and starting authorization today. CM received a call from [address] [place] [address] Route  [phone] left  for her to return call for update.   JUVENCIO Parisi

## 2022-11-09 NOTE — PROGRESS NOTES
Bedside and Verbal shift change report given to EMERY Hayden/Marcelino LAND (oncoming nurse) by Margot Starks LPN (offgoing nurse). Report included the following information SBAR, Kardex, Intake/Output, MAR, and Recent Results. Opportunity for questions and clarification were provided.

## 2022-11-10 LAB
ANION GAP SERPL CALC-SCNC: 6 MMOL/L (ref 5–15)
BASOPHILS # BLD: 0 K/UL (ref 0–0.1)
BASOPHILS NFR BLD: 0 % (ref 0–1)
BUN SERPL-MCNC: 23 MG/DL (ref 6–20)
BUN/CREAT SERPL: 30 (ref 12–20)
CALCIUM SERPL-MCNC: 8.1 MG/DL (ref 8.5–10.1)
CHLORIDE SERPL-SCNC: 104 MMOL/L (ref 97–108)
CO2 SERPL-SCNC: 27 MMOL/L (ref 21–32)
CREAT SERPL-MCNC: 0.77 MG/DL (ref 0.7–1.3)
DIFFERENTIAL METHOD BLD: ABNORMAL
EOSINOPHIL # BLD: 0 K/UL (ref 0–0.4)
EOSINOPHIL NFR BLD: 0 % (ref 0–7)
ERYTHROCYTE [DISTWIDTH] IN BLOOD BY AUTOMATED COUNT: 16.5 % (ref 11.5–14.5)
GLUCOSE SERPL-MCNC: 116 MG/DL (ref 65–100)
HCT VFR BLD AUTO: 34.3 % (ref 36.6–50.3)
HGB BLD-MCNC: 10.8 G/DL (ref 12.1–17)
IMM GRANULOCYTES # BLD AUTO: 0 K/UL (ref 0–0.04)
IMM GRANULOCYTES NFR BLD AUTO: 0 % (ref 0–0.5)
LYMPHOCYTES # BLD: 2.1 K/UL (ref 0.8–3.5)
LYMPHOCYTES NFR BLD: 10 % (ref 12–49)
MCH RBC QN AUTO: 24.4 PG (ref 26–34)
MCHC RBC AUTO-ENTMCNC: 31.5 G/DL (ref 30–36.5)
MCV RBC AUTO: 77.6 FL (ref 80–99)
MONOCYTES # BLD: 1.7 K/UL (ref 0–1)
MONOCYTES NFR BLD: 8 % (ref 5–13)
NEUTS SEG # BLD: 17.1 K/UL (ref 1.8–8)
NEUTS SEG NFR BLD: 82 % (ref 32–75)
NRBC # BLD: 0 K/UL (ref 0–0.01)
NRBC BLD-RTO: 0 PER 100 WBC
PLATELET # BLD AUTO: 382 K/UL (ref 150–400)
PMV BLD AUTO: 9.9 FL (ref 8.9–12.9)
POTASSIUM SERPL-SCNC: 3.2 MMOL/L (ref 3.5–5.1)
RBC # BLD AUTO: 4.42 M/UL (ref 4.1–5.7)
RBC MORPH BLD: ABNORMAL
SARS-COV-2, COV2: NORMAL
SODIUM SERPL-SCNC: 137 MMOL/L (ref 136–145)
WBC # BLD AUTO: 20.9 K/UL (ref 4.1–11.1)

## 2022-11-10 PROCEDURE — 74011000250 HC RX REV CODE- 250: Performed by: STUDENT IN AN ORGANIZED HEALTH CARE EDUCATION/TRAINING PROGRAM

## 2022-11-10 PROCEDURE — 84100 ASSAY OF PHOSPHORUS: CPT

## 2022-11-10 PROCEDURE — 65270000046 HC RM TELEMETRY

## 2022-11-10 PROCEDURE — 74011250637 HC RX REV CODE- 250/637: Performed by: STUDENT IN AN ORGANIZED HEALTH CARE EDUCATION/TRAINING PROGRAM

## 2022-11-10 PROCEDURE — 80048 BASIC METABOLIC PNL TOTAL CA: CPT

## 2022-11-10 PROCEDURE — 74011250637 HC RX REV CODE- 250/637: Performed by: INTERNAL MEDICINE

## 2022-11-10 PROCEDURE — U0005 INFEC AGEN DETEC AMPLI PROBE: HCPCS

## 2022-11-10 PROCEDURE — 74011000250 HC RX REV CODE- 250: Performed by: INTERNAL MEDICINE

## 2022-11-10 PROCEDURE — 85025 COMPLETE CBC W/AUTO DIFF WBC: CPT

## 2022-11-10 PROCEDURE — 94760 N-INVAS EAR/PLS OXIMETRY 1: CPT

## 2022-11-10 PROCEDURE — 36415 COLL VENOUS BLD VENIPUNCTURE: CPT

## 2022-11-10 PROCEDURE — 74011250636 HC RX REV CODE- 250/636: Performed by: STUDENT IN AN ORGANIZED HEALTH CARE EDUCATION/TRAINING PROGRAM

## 2022-11-10 PROCEDURE — 74011250637 HC RX REV CODE- 250/637: Performed by: PHYSICIAN ASSISTANT

## 2022-11-10 RX ORDER — POTASSIUM CHLORIDE 20 MEQ/1
40 TABLET, EXTENDED RELEASE ORAL
Status: COMPLETED | OUTPATIENT
Start: 2022-11-10 | End: 2022-11-11

## 2022-11-10 RX ORDER — OXYCODONE HYDROCHLORIDE 5 MG/1
30 TABLET ORAL
Status: DISCONTINUED | OUTPATIENT
Start: 2022-11-10 | End: 2022-11-10

## 2022-11-10 RX ORDER — OXYCODONE HYDROCHLORIDE 5 MG/1
30 TABLET ORAL
Status: DISCONTINUED | OUTPATIENT
Start: 2022-11-10 | End: 2022-11-12

## 2022-11-10 RX ORDER — CALCIUM CARBONATE 200(500)MG
200 TABLET,CHEWABLE ORAL
Status: DISCONTINUED | OUTPATIENT
Start: 2022-11-10 | End: 2022-11-20 | Stop reason: HOSPADM

## 2022-11-10 RX ADMIN — OXYCODONE 30 MG: 5 TABLET ORAL at 09:37

## 2022-11-10 RX ADMIN — ACETAMINOPHEN 650 MG: 325 TABLET ORAL at 13:20

## 2022-11-10 RX ADMIN — ACETAMINOPHEN 650 MG: 325 TABLET ORAL at 17:50

## 2022-11-10 RX ADMIN — PANTOPRAZOLE SODIUM 40 MG: 40 TABLET, DELAYED RELEASE ORAL at 09:38

## 2022-11-10 RX ADMIN — CALCIUM CARBONATE (ANTACID) CHEW TAB 500 MG 200 MG: 500 CHEW TAB at 13:20

## 2022-11-10 RX ADMIN — CASTOR OIL AND BALSAM, PERU: 788; 87 OINTMENT TOPICAL at 09:00

## 2022-11-10 RX ADMIN — CARVEDILOL 3.12 MG: 3.12 TABLET, FILM COATED ORAL at 09:38

## 2022-11-10 RX ADMIN — METHADONE HYDROCHLORIDE 60 MG: 10 CONCENTRATE ORAL at 09:38

## 2022-11-10 RX ADMIN — LEVOTHYROXINE SODIUM 125 MCG: 0.12 TABLET ORAL at 09:38

## 2022-11-10 RX ADMIN — TIZANIDINE 2 MG: 2 TABLET ORAL at 21:41

## 2022-11-10 RX ADMIN — GABAPENTIN 100 MG: 300 CAPSULE ORAL at 21:41

## 2022-11-10 RX ADMIN — CARVEDILOL 3.12 MG: 3.12 TABLET, FILM COATED ORAL at 17:50

## 2022-11-10 RX ADMIN — SODIUM CHLORIDE, PRESERVATIVE FREE 10 ML: 5 INJECTION INTRAVENOUS at 06:00

## 2022-11-10 RX ADMIN — ALLOPURINOL 100 MG: 100 TABLET ORAL at 09:38

## 2022-11-10 RX ADMIN — OXYCODONE 20 MG: 5 TABLET ORAL at 04:42

## 2022-11-10 RX ADMIN — ACETAMINOPHEN 650 MG: 325 TABLET ORAL at 09:38

## 2022-11-10 RX ADMIN — SODIUM CHLORIDE, PRESERVATIVE FREE 10 ML: 5 INJECTION INTRAVENOUS at 21:42

## 2022-11-10 RX ADMIN — CASTOR OIL AND BALSAM, PERU: 788; 87 OINTMENT TOPICAL at 21:42

## 2022-11-10 RX ADMIN — ALUMINUM HYDROXIDE AND MAGNESIUM HYDROXIDE 40 ML: 200; 200 SUSPENSION ORAL at 09:38

## 2022-11-10 RX ADMIN — CALCIUM CARBONATE (ANTACID) CHEW TAB 500 MG 200 MG: 500 CHEW TAB at 08:00

## 2022-11-10 RX ADMIN — CALCIUM CARBONATE (ANTACID) CHEW TAB 500 MG 200 MG: 500 CHEW TAB at 17:50

## 2022-11-10 RX ADMIN — ENOXAPARIN SODIUM 30 MG: 100 INJECTION SUBCUTANEOUS at 09:38

## 2022-11-10 RX ADMIN — OXYCODONE 30 MG: 5 TABLET ORAL at 17:50

## 2022-11-10 RX ADMIN — GABAPENTIN 100 MG: 300 CAPSULE ORAL at 09:38

## 2022-11-10 RX ADMIN — TAMSULOSIN HYDROCHLORIDE 0.4 MG: 0.4 CAPSULE ORAL at 09:38

## 2022-11-10 RX ADMIN — SODIUM CHLORIDE, PRESERVATIVE FREE 10 ML: 5 INJECTION INTRAVENOUS at 15:41

## 2022-11-10 RX ADMIN — TIZANIDINE 2 MG: 2 TABLET ORAL at 09:38

## 2022-11-10 RX ADMIN — OXYCODONE 20 MG: 5 TABLET ORAL at 01:37

## 2022-11-10 RX ADMIN — DOXAZOSIN MESYLATE 2 MG: 2 TABLET ORAL at 09:38

## 2022-11-10 RX ADMIN — OXYCODONE 30 MG: 5 TABLET ORAL at 13:20

## 2022-11-10 RX ADMIN — GABAPENTIN 100 MG: 300 CAPSULE ORAL at 17:50

## 2022-11-10 RX ADMIN — TIZANIDINE 2 MG: 2 TABLET ORAL at 17:50

## 2022-11-10 RX ADMIN — COLCHICINE 0.6 MG: 0.6 TABLET, FILM COATED ORAL at 10:44

## 2022-11-10 NOTE — PROGRESS NOTES
Hospitalist Progress Note    NAME: Peterson Ricci   :  1965   MRN:  [id number]       Assessment / Plan:    ntractable back pain   D/t T5 and T6 fracture:    MRI 10/24/ 2022  1. Increased subacute T5 partial compression fracture. 2. Unchanged subacute T6 partial compression fracture. Bony retropulsion at T6  causes mild central spinal canal stenosis without cord compression. 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 Talked to neurosurgery Dr. Julio Pederson, doesn't think he needs surgical intervention as he doesn't have significant weakness of the RLE (RLE power 3/5 , has AKA on the left)  Discussed with Radiology, recommended doing CT thoracic spine, CT reviewed, looks like cement displaced. No treatment as such for this  Pain control with , methadone 60 mg daily, oxycodone PRN. Stop dilaudid  PT/OT recommending IPR. [facility] Tapering decadron (would to 4 mg q12 hours x 3 days, 2 mg q12 hours for 3 days and stop)  Medically ready pending placement     Carbapenem resistant urinary tract infection  History of recent Gross catheter and also straight cath  Urine culture grew Carbapenem resistant Klebsiella pneumonia  ID consulted, continued Gentamicin through 10/28/22, course completed     Suspected aspiration pneumonia  Acute hypoxic respiratory failure  -MRI shows evidence of aspiration pneumonia. Chest x-ray also shows basilar infiltrates with possible effusion  Started empiric antibiotics with cefepime and Flagyl, started 10/25/22  Changed to Cefdinir flagyl, EOT 10/31/22, 7 day course completed. Aspiration precautions. Was evaluated by speech, now on regular diet.   Procal normal  Now weaned off oxygen  CXR 10/28/22 showed L hemithorax opacity possible LLL collapse, L pleural effusion  C/w incentive spirometry  Pulmonary evaluated and signed off  Followup CT in 1 year for 3mm pulm nodule     Possible gout flare - improved  Describes knee pain, similar to previous gout flare  Cw Colchicine now on 0.6 daily  Started allopurinol 100 daily. Was prescribed 300 daily but not taking consistently, so essentially starting over     Chronic pain on methadone  -Continue home methadone. Rest of pain meds as above    Debility secondary to CVA and recent left AKA  -Continue PT OT     Chronic wounds  -Continue wound care     Mild COPD exacerbation  Prn duonebs  S/p prednisone    GERD  Protonix daily     Essential hypertension  Continue PTA Coreg     Hypothyroidism  Continue PTA Synthroid     Left AKA     30.0 - 39.9 Obese / Body mass index is 30.79 kg/m². Code status: Full  Prophylaxis: Lovenox  Recommended Disposition: SNF  BARBARA: Medically ready  Barriers: IPR placement    Subjective:     Chief Complaint / Reason for Physician Visit  Follow up for vertebral fracture  \"The back pain is still real severe\"  Wants to go to rehab  Asking to increase pain meds  \"Not sure how I can live with this\"    Review of Systems:  Symptom Y/N Comments  Symptom Y/N Comments   Fever/Chills n   Chest Pain n    Poor Appetite n   Edema n    Cough n   Abdominal Pain     Sputum    Joint Pain y Back pain   SOB/BECK n   Pruritis/Rash     Nausea/vomit n   Tolerating PT/OT     Diarrhea n   Tolerating Diet y    Constipation    Other       Could NOT obtain due to:      Objective:     VITALS:   Last 24hrs VS reviewed since prior progress note.  Most recent are:  Patient Vitals for the past 24 hrs:   Temp Pulse Resp BP SpO2   11/09/22 1557 98.4 °F (36.9 °C) (!) 55 18 139/73 94 %   11/09/22 0901 98.3 °F (36.8 °C) 63 18 127/60 94 %   11/09/22 0116 -- -- -- (!) 153/75 --   11/09/22 0110 -- 61 -- (!) 190/84 --   11/08/22 2357 98.2 °F (36.8 °C) (!) 56 18 (!) 182/94 95 %         Intake/Output Summary (Last 24 hours) at 11/9/2022 2341  Last data filed at 11/9/2022 1027  Gross per 24 hour   Intake --   Output 1500 ml   Net -1500 ml          I had a face to face encounter and independently examined this patient on 11/9/2022, as outlined below:  PHYSICAL EXAM:  General: Awake, No acute distress  EENT:  EOMI. Anicteric sclerae. MMM  Resp:  CTA bilaterally, no wheezing or rales. No accessory muscle use  CV:  Regular  rhythm,  No edema  GI:  Soft, Non distended, Non tender. +Bowel sounds  Neurologic:  Alert and oriented X 3, normal speech,   Psych:   Not anxious nor agitated  Ext: Power 4/5 on RLE, left AKA (has prior weakness)  Power 5/5 on RUE and has residual weakness on left    Reviewed most current lab test results and cultures  YES  Reviewed most current radiology test results   YES  Review and summation of old records today    NO  Reviewed patient's current orders and MAR    YES  PMH/SH reviewed - no change compared to H&P  ________________________________________________________________________  Care Plan discussed with:    Comments   Patient y    Family      RN y    Care Manager     Consultant                       y Multidiciplinary team rounds were held today with , nursing, pharmacist and clinical coordinator. Patient's plan of care was discussed; medications were reviewed and discharge planning was addressed. ________________________________________________________________________        Comments   >50% of visit spent in counseling and coordination of care     ________________________________________________________________________  Carina Tasha, MD     Procedures: see electronic medical records for all procedures/Xrays and details which were not copied into this note but were reviewed prior to creation of Plan. LABS:  I reviewed today's most current labs and imaging studies.   Pertinent labs include:  No results for input(s): WBC, HGB, HCT, PLT, HGBEXT, HCTEXT, PLTEXT, HGBEXT, HCTEXT, PLTEXT in the last 72 hours. No results for input(s): NA, K, CL, CO2, GLU, BUN, CREA, CA, MG, PHOS, ALB, TBIL, TBILI, ALT, INR, INREXT, INREXT in the last 72 hours.     No lab exists for component: SGOT      Signed: Clive Santana MD

## 2022-11-10 NOTE — PROGRESS NOTES
Comprehensive Nutrition Assessment    Type and Reason for Visit: Reassess    Nutrition Recommendations/Plan:   Continue regular diet      Malnutrition Assessment:  Malnutrition Status:  No malnutrition (11/03/22 1507)      Nutrition Assessment:    Chart reviewed; patient continues on a regular diet with good PO intake. RN at bedside providing care at time of attempted visit. Stable for discharge the last few days; awaiting insurance/placement. Continue current nutrition plan of care. Patient Vitals for the past 168 hrs:   % Diet Eaten   11/08/22 0817 76 - 100%   11/07/22 2037 76 - 100%   11/07/22 0811 76 - 100%   11/04/22 1140 76 - 100%   11/04/22 0913 76 - 100%   11/03/22 1746 76 - 100%     Nutrition Related Findings:    2+ edema  BM 11/8  Tums, Coreg, Synthroid, Protonix  Wound Type: Stage II, Surgical incision    Current Nutrition Intake & Therapies:  Average Meal Intake: %  Average Supplement Intake: None ordered  DIET ONE TIME MESSAGE  ADULT DIET Regular    Anthropometric Measures:  Height: 6' (182.9 cm)  Ideal Body Weight (IBW): 178 lbs (81 kg)     Current Body Wt:  103 kg (227 lb 1.2 oz), 127.6 % IBW. Not specified  Current BMI (kg/m2): 30.8        Weight Adjustment: No adjustment                 BMI Category: Obese class 1 (BMI 30.0-34. 9)    Estimated Daily Nutrient Needs:  Energy Requirements Based On: Formula  Weight Used for Energy Requirements: Current  Energy (kcal/day): 1973 kcals (BMR 1893 x 1. 2AF -300kcals)  Weight Used for Protein Requirements: Current  Protein (g/day): 82-103g (0.8-1.0 g/kg bw)  Method Used for Fluid Requirements: 1 ml/kcal  Fluid (ml/day): 2000 ml    Nutrition Diagnosis:   No nutrition diagnosis at this time       Nutrition Interventions:   Food and/or Nutrient Delivery: Continue current diet  Nutrition Education/Counseling: No recommendations at this time  Coordination of Nutrition Care: Continue to monitor while inpatient       Goals:  Previous Goal Met: Goal(s) achieved  Goals: PO intake 75% or greater, by next RD assessment       Nutrition Monitoring and Evaluation:   Behavioral-Environmental Outcomes: None identified  Food/Nutrient Intake Outcomes: Food and nutrient intake  Physical Signs/Symptoms Outcomes: Biochemical data, Skin, Weight    Discharge Planning:    Continue current diet    Jerry Nelson RD  Contact: ext [phone]

## 2022-11-10 NOTE — PROGRESS NOTES
Physical Therapy    Pt received supine in bed, c/o  phantom pains with LLE at 10/10 with pain, sitting in soiled linens and declined any Therapy at this time. Rn requested assistance with rolling with pt for linen change, with pt reporting that he needed to have a BM and requested the bed menon. Klever pate for rolling to L side with reports of pain. Revisited with Rn reporting that pt allowed her to change the linens with reports of Pt able to R/L with BLE flexion to aid ludwig care. Pt continues to report that he would not do therapy here but he would do it at [facility]. PT deferred at this time.      Professionally,     Keturah Roque, PTA

## 2022-11-10 NOTE — PROGRESS NOTES
Transitions of Care:     RUR- 13%  Disposition: [facility] - authorization pending- will need a PCR- it is good for 7 days    Transition of Care Plan:     RUR:13%  Disposition:[facility]  Follow up appointments:rehab  DME needed:new hospital bed with [facility] and [facility], [facility]  Transportation at Discharge: [id number] [facility] or means to access home:   patient/friend     IM Medicare Letter:n/a  Is patient a [place] and connected with the [place]? N/a  If yes, was [employer] transfer form completed and VA notified? Caregiver Contact:Venkata Cruz [phone]  Discharge Caregiver contacted prior to discharge? Per patient  Care Conference needed?:    no     CM notified by Jordan Ibrahim with [facility] they have medically approved patient and authorization pending. CM received a call from [facility] [address] [phone]- provided update that [facility] will not pull auth until bed is picked up - CM made Valera Homans with insurance aware and she will have [facility] follow-up with Lg Gross, joan.  Jone Fitch, MSW

## 2022-11-10 NOTE — PROGRESS NOTES
Hospitalist Progress Note    NAME: Denver Spanner   :  1965   MRN:  [id number]     Denver Spanner is a 61 y.o. male with past medical history as listed below who presents with complaints of severe back pain reportedly under shoulder blades reportedly 10/10 pain that is constant/aching as well as mild worsening of baseline shortness of breath secondary to underlying COPD. Assessment / Plan:  T12 inferior vertebral endplate compression fracture  Acute low back pain due to above  -CT abdomen shows possibility of T12 inferior vertebral endplate compression fracture. Pending MRI. -Patient reports that he will not be able to tolerate MRI except under sedation  -Anesthesia consulted for MRI under sedation  -Discussed with Dr. Olivia Chávez.  -Waiting on MRI    Carbapenem resistant urinary tract infection  History of recent Gross catheter and also straight cath  -Urine culture grew Carbapenem resistant Klebsiella pneumonia  -Based on urine culture and sensitivity, currently on gentamicin  -May need IV gentamicin upon discharge      Chronic pain on methadone  -Continue home methadone      Debility secondary to CVA and recent left AKA  -Continue PT OT    Chronic wounds  -Continue wound care       Mild COPD exacerbation  Schedule DuoNebs every 6 hours x24 hours with every 4 hours as needed breakthrough nebs  S/p prednisone  Hold off on azithromycin at present given mild symptoms    History of gout  No evidence of active flare  Continue PTA allopurinol     GERD  Protonix daily     Essential hypertension  Continue PTA Coreg     Hypothyroidism  Continue PTA Synthroid     Incidental findings requiring outpatient follow up/ evaluation:  3 mm right lung nodule. -Guidelines recommend repeat scan in 12 months  Increase in size of intermediate density left renal lesion. Recommend  follow-up with ultrasound in 6 months. Left AKA    30.0 - 39.9 Obese / Body mass index is 30.79 kg/m².     Estimated discharge date: 10/22  Barriers: MRI, kyphoplasty    Code status: Full  Prophylaxis: Lovenox  Recommended Disposition:  tbd     Subjective:   Still reports some back pain. No dysuria urgency  No fever  Overnight events noted and discussed with nursing            Objective:     VITALS:   Last 24hrs VS reviewed since prior progress note. Most recent are:  Patient Vitals for the past 24 hrs:   Temp Pulse Resp BP SpO2   11/10/22 1419 98.4 °F (36.9 °C) (!) 56 18 (!) 100/58 94 %   11/10/22 0917 98.3 °F (36.8 °C) 72 18 106/66 92 %   11/09/22 2320 98.2 °F (36.8 °C) (!) 58 18 (!) 154/81 96 %   11/09/22 1557 98.4 °F (36.9 °C) (!) 55 18 139/73 94 %         Intake/Output Summary (Last 24 hours) at 11/10/2022 1448  Last data filed at 11/10/2022 1158  Gross per 24 hour   Intake --   Output 2275 ml   Net -2275 ml          I had a face to face encounter and independently examined this patient  as outlined below:  PHYSICAL EXAM:  General: Alert, cooperative, appears stated age  Resp:  CTA bilaterally, no wheezing or rales. No accessory muscle use  CV:  Regular  rhythm,  No edema  GI:  Soft, Non distended, Non tender. +Bowel sounds  Neurologic:  Alert and oriented X 3, normal speech, left side weakness, sensations are intact, pupils are reactive bilaterally  Psych:    Not anxious nor agitated  Skin:  Skin tears and wounds in various stages of healing on extremities  Extremities      Left AKA    Reviewed most current lab test results and cultures  YES  Reviewed most current radiology test results   YES  Review and summation of old records today    NO  Reviewed patient's current orders and MAR    YES  PMH/ reviewed - no change compared to H&P  ________________________________________________________________________  Care Plan discussed with:    Comments   Patient x    Family      RN x    Care Manager     Consultant                        Multidiciplinary team rounds were held today with , nursing, pharmacist and clinical coordinator. Patient's plan of care was discussed; medications were reviewed and discharge planning was addressed. ________________________________________________________________________        Comments   >50% of visit spent in counseling and coordination of care x    ________________________________________________________________________  Vadim Singletary MD     Procedures: see electronic medical records for all procedures/Xrays and details which were not copied into this note but were reviewed prior to creation of Plan. LABS:  I reviewed today's most current labs and imaging studies.   Pertinent labs include:  Recent Labs     11/10/22  1404   WBC 20.9*   HGB 10.8*   HCT 34.3*          Recent Labs     11/10/22  1404      K 3.2*      CO2 27   *   BUN 23*   CREA 0.77   CA 8.1*         Signed: Vadim Singletary MD

## 2022-11-10 NOTE — PROGRESS NOTES
Hospitalist Progress Note    NAME: Earl Desai   :  1965   MRN:  [id number]     Earl Desai is a 61 y.o.   male with past medical history as listed below who presents with complaints of severe back pain reportedly under shoulder blades reportedly 10/10 pain that is constant/aching as well as mild worsening of baseline shortness of breath secondary to underlying COPD. Assessment / Plan:  T12 inferior vertebral endplate compression fracture  Acute low back pain due to above  -CT abdomen shows possibility of T12 inferior vertebral endplate compression fracture. -MRI to be done under sedation today by anesthesia per patient's request  -Based on MRI, will consider kyphoplasty  -Pain control with fentanyl patch, methadone 60 mg daily, oxycodone 10 mg every 3 hours as needed.  -Patient was notified that we will not be able to escalate his pain regimen since he is getting very high doses currently    Carbapenem resistant urinary tract infection  History of recent Gross catheter and also straight cath  -Urine culture grew Carbapenem resistant Klebsiella pneumonia  -Based on urine culture and sensitivity, currently on gentamicin  -We will treat with gentamicin for a total of 10 days. Chronic pain on methadone  -Continue home methadone      Debility secondary to CVA and recent left AKA  -Continue PT OT    Chronic wounds  -Continue wound care       Mild COPD exacerbation  Schedule DuoNebs every 6 hours x24 hours with every 4 hours as needed breakthrough nebs  S/p prednisone  Hold off on azithromycin at present given mild symptoms    Leukocytosis likely due to steroids  -WBC 17.5. He is afebrile. Check CBC in AM.    History of gout  No evidence of active flare  Continue PTA allopurinol     GERD  Protonix daily     Essential hypertension  Continue PTA Coreg     Hypothyroidism  Continue PTA Synthroid     Incidental findings requiring outpatient follow up/ evaluation:  3 mm right lung nodule.  -Guidelines recommend repeat scan in 12 months  Increase in size of intermediate density left renal lesion. Recommend  follow-up with ultrasound in 6 months. Left AKA    30.0 - 39.9 Obese / Body mass index is 30.79 kg/m². Estimated discharge date: 10/26  Barriers: MRI, kyphoplasty    Code status: Full  Prophylaxis: Lovenox  Recommended Disposition:  tbd     Subjective:   Continues to report 10 out of 10 lower back pain and wants me to increase pain medications  He is upset about a long delay in getting MRI            Objective:     VITALS:   Last 24hrs VS reviewed since prior progress note. Most recent are:  Patient Vitals for the past 24 hrs:   Temp Pulse Resp BP SpO2   11/10/22 1419 98.4 °F (36.9 °C) (!) 56 18 (!) 100/58 94 %   11/10/22 0917 98.3 °F (36.8 °C) 72 18 106/66 92 %   11/09/22 2320 98.2 °F (36.8 °C) (!) 58 18 (!) 154/81 96 %   11/09/22 1557 98.4 °F (36.9 °C) (!) 55 18 139/73 94 %         Intake/Output Summary (Last 24 hours) at 11/10/2022 1449  Last data filed at 11/10/2022 1158  Gross per 24 hour   Intake --   Output 2275 ml   Net -2275 ml          I had a face to face encounter and independently examined this patient  as outlined below:  PHYSICAL EXAM:  General: Alert, cooperative, appears stated age  Resp:  CTA bilaterally, no wheezing or rales. No accessory muscle use  CV:  Regular  rhythm,  No edema  GI:  Soft, Non distended, Non tender.   +Bowel sounds  Neurologic:  Alert and oriented X 3, normal speech, left side weakness, sensations are intact, pupils are reactive bilaterally  Psych:    Not anxious nor agitated  Skin:  Skin tears and wounds in various stages of healing on extremities  Extremities      Left AKA    Reviewed most current lab test results and cultures  YES  Reviewed most current radiology test results   YES  Review and summation of old records today    NO  Reviewed patient's current orders and MAR    YES  PMH/SH reviewed - no change compared to H&P  ________________________________________________________________________  Care Plan discussed with:    Comments   Patient x    Family      RN x    Care Manager     Consultant                        Multidiciplinary team rounds were held today with , nursing, pharmacist and clinical coordinator. Patient's plan of care was discussed; medications were reviewed and discharge planning was addressed. ________________________________________________________________________        Comments   >50% of visit spent in counseling and coordination of care x    ________________________________________________________________________  Raffi Lobato MD     Procedures: see electronic medical records for all procedures/Xrays and details which were not copied into this note but were reviewed prior to creation of Plan. LABS:  I reviewed today's most current labs and imaging studies.   Pertinent labs include:  Recent Labs     11/10/22  1404   WBC 20.9*   HGB 10.8*   HCT 34.3*          Recent Labs     11/10/22  1404      K 3.2*      CO2 27   *   BUN 23*   CREA 0.77   CA 8.1*         Signed: Raffi Lobato MD

## 2022-11-11 ENCOUNTER — APPOINTMENT (OUTPATIENT)
Dept: GENERAL RADIOLOGY | Age: 57
DRG: 321 | End: 2022-11-11
Attending: INTERNAL MEDICINE
Payer: MEDICAID

## 2022-11-11 LAB
APPEARANCE UR: CLEAR
BACTERIA URNS QL MICRO: NEGATIVE /HPF
BASOPHILS # BLD: 0 K/UL (ref 0–0.1)
BASOPHILS NFR BLD: 0 % (ref 0–1)
BILIRUB UR QL: NEGATIVE
CAOX CRY URNS QL MICRO: ABNORMAL
COLOR UR: ABNORMAL
DIFFERENTIAL METHOD BLD: ABNORMAL
EOSINOPHIL # BLD: 0.5 K/UL (ref 0–0.4)
EOSINOPHIL NFR BLD: 3 % (ref 0–7)
EPITH CASTS URNS QL MICRO: ABNORMAL /LPF
ERYTHROCYTE [DISTWIDTH] IN BLOOD BY AUTOMATED COUNT: 17 % (ref 11.5–14.5)
GLUCOSE UR STRIP.AUTO-MCNC: NEGATIVE MG/DL
HCT VFR BLD AUTO: 34.9 % (ref 36.6–50.3)
HGB BLD-MCNC: 10.8 G/DL (ref 12.1–17)
HGB UR QL STRIP: NEGATIVE
IMM GRANULOCYTES # BLD AUTO: 0 K/UL (ref 0–0.04)
IMM GRANULOCYTES NFR BLD AUTO: 0 % (ref 0–0.5)
KETONES UR QL STRIP.AUTO: NEGATIVE MG/DL
LEUKOCYTE ESTERASE UR QL STRIP.AUTO: ABNORMAL
LYMPHOCYTES # BLD: 2.7 K/UL (ref 0.8–3.5)
LYMPHOCYTES NFR BLD: 15 % (ref 12–49)
MCH RBC QN AUTO: 24.2 PG (ref 26–34)
MCHC RBC AUTO-ENTMCNC: 30.9 G/DL (ref 30–36.5)
MCV RBC AUTO: 78.1 FL (ref 80–99)
MONOCYTES # BLD: 1.3 K/UL (ref 0–1)
MONOCYTES NFR BLD: 7 % (ref 5–13)
NEUTS SEG # BLD: 13.7 K/UL (ref 1.8–8)
NEUTS SEG NFR BLD: 75 % (ref 32–75)
NITRITE UR QL STRIP.AUTO: NEGATIVE
NRBC # BLD: 0 K/UL (ref 0–0.01)
NRBC BLD-RTO: 0 PER 100 WBC
PH UR STRIP: 7 [PH] (ref 5–8)
PHOSPHATE SERPL-MCNC: 3.5 MG/DL (ref 2.6–4.7)
PLATELET # BLD AUTO: 398 K/UL (ref 150–400)
PMV BLD AUTO: 9.8 FL (ref 8.9–12.9)
PROCALCITONIN SERPL-MCNC: <0.05 NG/ML
PROT UR STRIP-MCNC: NEGATIVE MG/DL
RBC # BLD AUTO: 4.47 M/UL (ref 4.1–5.7)
RBC #/AREA URNS HPF: ABNORMAL /HPF (ref 0–5)
RBC MORPH BLD: ABNORMAL
RBC MORPH BLD: ABNORMAL
SARS-COV-2, XPLCVT: NOT DETECTED
SOURCE, COVRS: NORMAL
SP GR UR REFRACTOMETRY: 1.01
UR CULT HOLD, URHOLD: NORMAL
UROBILINOGEN UR QL STRIP.AUTO: 1 EU/DL (ref 0.2–1)
WBC # BLD AUTO: 18.2 K/UL (ref 4.1–11.1)
WBC URNS QL MICRO: ABNORMAL /HPF (ref 0–4)

## 2022-11-11 PROCEDURE — 74011250637 HC RX REV CODE- 250/637: Performed by: STUDENT IN AN ORGANIZED HEALTH CARE EDUCATION/TRAINING PROGRAM

## 2022-11-11 PROCEDURE — 74011000250 HC RX REV CODE- 250: Performed by: STUDENT IN AN ORGANIZED HEALTH CARE EDUCATION/TRAINING PROGRAM

## 2022-11-11 PROCEDURE — 97530 THERAPEUTIC ACTIVITIES: CPT

## 2022-11-11 PROCEDURE — 85025 COMPLETE CBC W/AUTO DIFF WBC: CPT

## 2022-11-11 PROCEDURE — 84145 PROCALCITONIN (PCT): CPT

## 2022-11-11 PROCEDURE — 74011250636 HC RX REV CODE- 250/636: Performed by: STUDENT IN AN ORGANIZED HEALTH CARE EDUCATION/TRAINING PROGRAM

## 2022-11-11 PROCEDURE — 84100 ASSAY OF PHOSPHORUS: CPT

## 2022-11-11 PROCEDURE — 97535 SELF CARE MNGMENT TRAINING: CPT

## 2022-11-11 PROCEDURE — 81001 URINALYSIS AUTO W/SCOPE: CPT

## 2022-11-11 PROCEDURE — 65270000046 HC RM TELEMETRY

## 2022-11-11 PROCEDURE — 74011250637 HC RX REV CODE- 250/637: Performed by: PHYSICIAN ASSISTANT

## 2022-11-11 PROCEDURE — 74011250637 HC RX REV CODE- 250/637: Performed by: INTERNAL MEDICINE

## 2022-11-11 PROCEDURE — 36415 COLL VENOUS BLD VENIPUNCTURE: CPT

## 2022-11-11 PROCEDURE — 71045 X-RAY EXAM CHEST 1 VIEW: CPT

## 2022-11-11 RX ADMIN — OXYCODONE 30 MG: 5 TABLET ORAL at 22:46

## 2022-11-11 RX ADMIN — OXYCODONE 30 MG: 5 TABLET ORAL at 08:04

## 2022-11-11 RX ADMIN — LEVOTHYROXINE SODIUM 125 MCG: 0.12 TABLET ORAL at 08:04

## 2022-11-11 RX ADMIN — CALCIUM CARBONATE (ANTACID) CHEW TAB 500 MG 200 MG: 500 CHEW TAB at 08:05

## 2022-11-11 RX ADMIN — CARVEDILOL 3.12 MG: 3.12 TABLET, FILM COATED ORAL at 08:05

## 2022-11-11 RX ADMIN — ACETAMINOPHEN 650 MG: 325 TABLET ORAL at 11:53

## 2022-11-11 RX ADMIN — TIZANIDINE 2 MG: 2 TABLET ORAL at 08:05

## 2022-11-11 RX ADMIN — ALLOPURINOL 100 MG: 100 TABLET ORAL at 08:05

## 2022-11-11 RX ADMIN — TIZANIDINE 2 MG: 2 TABLET ORAL at 22:45

## 2022-11-11 RX ADMIN — SODIUM CHLORIDE, PRESERVATIVE FREE 10 ML: 5 INJECTION INTRAVENOUS at 05:50

## 2022-11-11 RX ADMIN — TIZANIDINE 2 MG: 2 TABLET ORAL at 16:14

## 2022-11-11 RX ADMIN — SODIUM CHLORIDE, PRESERVATIVE FREE 10 ML: 5 INJECTION INTRAVENOUS at 22:48

## 2022-11-11 RX ADMIN — PANTOPRAZOLE SODIUM 40 MG: 40 TABLET, DELAYED RELEASE ORAL at 08:04

## 2022-11-11 RX ADMIN — METHADONE HYDROCHLORIDE 60 MG: 10 CONCENTRATE ORAL at 08:05

## 2022-11-11 RX ADMIN — CALCIUM CARBONATE (ANTACID) CHEW TAB 500 MG 200 MG: 500 CHEW TAB at 18:50

## 2022-11-11 RX ADMIN — TAMSULOSIN HYDROCHLORIDE 0.4 MG: 0.4 CAPSULE ORAL at 08:04

## 2022-11-11 RX ADMIN — CASTOR OIL AND BALSAM, PERU: 788; 87 OINTMENT TOPICAL at 21:01

## 2022-11-11 RX ADMIN — CASTOR OIL AND BALSAM, PERU: 788; 87 OINTMENT TOPICAL at 08:09

## 2022-11-11 RX ADMIN — GABAPENTIN 100 MG: 300 CAPSULE ORAL at 16:14

## 2022-11-11 RX ADMIN — CARVEDILOL 3.12 MG: 3.12 TABLET, FILM COATED ORAL at 18:50

## 2022-11-11 RX ADMIN — OXYCODONE 30 MG: 5 TABLET ORAL at 16:14

## 2022-11-11 RX ADMIN — OXYCODONE 30 MG: 5 TABLET ORAL at 11:53

## 2022-11-11 RX ADMIN — COLCHICINE 0.6 MG: 0.6 TABLET, FILM COATED ORAL at 08:13

## 2022-11-11 RX ADMIN — SODIUM CHLORIDE, PRESERVATIVE FREE 10 ML: 5 INJECTION INTRAVENOUS at 14:32

## 2022-11-11 RX ADMIN — GABAPENTIN 100 MG: 300 CAPSULE ORAL at 22:45

## 2022-11-11 RX ADMIN — GABAPENTIN 100 MG: 300 CAPSULE ORAL at 08:05

## 2022-11-11 RX ADMIN — ENOXAPARIN SODIUM 30 MG: 100 INJECTION SUBCUTANEOUS at 08:04

## 2022-11-11 RX ADMIN — POTASSIUM CHLORIDE 40 MEQ: 1500 TABLET, EXTENDED RELEASE ORAL at 00:36

## 2022-11-11 RX ADMIN — SODIUM CHLORIDE, PRESERVATIVE FREE 10 ML: 5 INJECTION INTRAVENOUS at 22:47

## 2022-11-11 RX ADMIN — ACETAMINOPHEN 650 MG: 325 TABLET ORAL at 08:05

## 2022-11-11 RX ADMIN — ACETAMINOPHEN 650 MG: 325 TABLET ORAL at 18:50

## 2022-11-11 RX ADMIN — CALCIUM CARBONATE (ANTACID) CHEW TAB 500 MG 200 MG: 500 CHEW TAB at 11:53

## 2022-11-11 RX ADMIN — OXYCODONE 30 MG: 5 TABLET ORAL at 02:42

## 2022-11-11 RX ADMIN — DOXAZOSIN MESYLATE 2 MG: 2 TABLET ORAL at 08:05

## 2022-11-11 RX ADMIN — SODIUM CHLORIDE, PRESERVATIVE FREE 10 ML: 5 INJECTION INTRAVENOUS at 05:51

## 2022-11-11 RX ADMIN — ACETAMINOPHEN 650 MG: 325 TABLET ORAL at 00:36

## 2022-11-11 NOTE — PROGRESS NOTES
Hospitalist Progress Note    NAME: Ruben Kemp   :  1965   MRN:  [id number]       Assessment / Plan:    Leukocytosis WBC 20.9 on 11/10/2022  Normal WBC 8.0 on 11/3/2022  Started course of decadron 11/3, stopped 24 hours ago  No fevers or SIRS criteria  Suspect demargination  Check CXR and UA  BC with fevers  Check procalcitonin  Hold antibiotics    Intractable back pain   T5 and T6 fracture POA  MRI 10/24/ 2022  Increased subacute T5 partial compression fracture. Unchanged subacute T6 partial compression fracture. Bony retropulsion at T6        causes mild central spinal canal stenosis without cord compression. 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 Neurosurgery Dr. Ed Woods saw, doesn't think he needs surgical intervention   doesn't have significant weakness of the RLE  Discussed with Radiology, recommended doing CT thoracic spine  CT reviewed, looks like cement displaced. No treatment as such for this  Pain control with , methadone 60 mg daily, oxycodone PRN. PT/OT recommending IPR. [facility]. Tapering decadron, now off  Medically ready pending placement     Carbapenem resistant urinary tract infection POA  Recent Gross catheter and also straight cath  Urine culture grew Carbapenem resistant Klebsiella pneumonia  ID consulted, continued Gentamicin through 10/28/22, course completed     Suspected aspiration pneumonia  Acute hypoxic respiratory failure  MRI shows evidence of aspiration pneumonia. Chest x-ray also shows basilar infiltrates with possible effusion  Started empiric antibiotics with cefepime and Flagyl, started 10/25/22  Changed to Cefdinir flagyl, EOT 10/31/22, 7 day course completed. Aspiration precautions.   Procal normal  Now weaned off oxygen  Pulmonary evaluated and signed off  Followup CT in 1 year for 3mm pulm nodule     Possible gout flare - improved  Describes knee pain, similar to previous gout flare  Cw Colchicine now on 0.6 daily  Started allopurinol 100 daily. Chronic pain on methadone  Continue home methadone. Rest of pain meds as above    Debility secondary to CVA and recent left AKA  Continue PT OT     Chronic wounds  Continue wound care     Mild COPD exacerbation  Prn duonebs  S/p prednisone    GERD  Protonix daily     Essential hypertension  Continue PTA Coreg     Hypothyroidism  Continue PTA Synthroid     Left AKA     30.0 - 39.9 Obese / Body mass index is 30.79 kg/m². Code status: Full  Prophylaxis: Lovenox  Recommended Disposition: SNF  BARBARA: Medically ready  Barriers: IPR placement    Subjective:     Chief Complaint / Reason for Physician Visit  Follow up for vertebral fracture  \"Pain is about the same\" despite increased oxycodone  Awaiting rehab approval  Asking to increase pain meds    Review of Systems:  Symptom Y/N Comments  Symptom Y/N Comments   Fever/Chills n   Chest Pain n    Poor Appetite n   Edema n    Cough n   Abdominal Pain     Sputum    Joint Pain y Back pain   SOB/BECK n   Pruritis/Rash     Nausea/vomit n   Tolerating PT/OT     Diarrhea n   Tolerating Diet y    Constipation    Other       Could NOT obtain due to:      Objective:     VITALS:   Last 24hrs VS reviewed since prior progress note.  Most recent are:  Patient Vitals for the past 24 hrs:   Temp Pulse Resp BP SpO2   11/10/22 1419 98.4 °F (36.9 °C) (!) 56 18 (!) 100/58 94 %   11/10/22 0917 98.3 °F (36.8 °C) 72 18 106/66 92 %   11/09/22 2320 98.2 °F (36.8 °C) (!) 58 18 (!) 154/81 96 %         Intake/Output Summary (Last 24 hours) at 11/10/2022 2147  Last data filed at 11/10/2022 1158  Gross per 24 hour   Intake --   Output 1675 ml   Net -1675 ml          I had a face to face encounter and independently examined this patient on 11/10/2022, as outlined below:  PHYSICAL EXAM:  General: Awake, No acute distress  EENT:  EOMI. Anicteric sclerae. MMM  Resp:  CTA bilaterally, no wheezing or rales. No accessory muscle use  CV:  Regular  rhythm,  No edema  GI:  Soft, Non distended, Non tender. +Bowel sounds  Neurologic:  Alert and oriented X 3, normal speech,   Psych:   Not anxious nor agitated  Ext: Power 4/5 on RLE, left AKA (has prior weakness)  Power 5/5 on RUE and has residual weakness on left    Reviewed most current lab test results and cultures  YES  Reviewed most current radiology test results   YES  Review and summation of old records today    NO  Reviewed patient's current orders and MAR    YES  PMH/SH reviewed - no change compared to H&P  ________________________________________________________________________  Care Plan discussed with:    Comments   Patient y    Family      RN y    Care Manager     Consultant                       y Multidiciplinary team rounds were held today with , nursing, pharmacist and clinical coordinator. Patient's plan of care was discussed; medications were reviewed and discharge planning was addressed. ________________________________________________________________________        Comments   >50% of visit spent in counseling and coordination of care     ________________________________________________________________________  Maria C Alberto MD     Procedures: see electronic medical records for all procedures/Xrays and details which were not copied into this note but were reviewed prior to creation of Plan. LABS:  I reviewed today's most current labs and imaging studies.   Pertinent labs include:  Recent Labs     11/10/22  1404   WBC 20.9*   HGB 10.8*   HCT 34.3*          Recent Labs     11/10/22  1404      K 3.2*      CO2 27   *   BUN 23*   CREA 0.77   CA 8.1*         Signed: Maria C Alberto MD

## 2022-11-11 NOTE — PROGRESS NOTES
Problem: Mobility Impaired (Adult and Pediatric)  Goal: *Acute Goals and Plan of Care (Insert Text)  Description: FUNCTIONAL STATUS PRIOR TO ADMISSION: Pt lives at home, had been alone but recently since amputation his friend has been staying with him. Pt pt's request, talked with friend on phone to get status PTA. She stated pt was functioning at w/c level with an electric scooter; needed SBA for either pivot transfer or sliding board transfer (does have grace but she states he did better without); needed help with dressing and uses wipes for bathing because he could not get onto shower chair any longer. She states he was going to OPPT/OT at [facility]. She states they were apparently working on getting him a care aide. Pt also states that he has a  for his L AKA (5 weeks ago) but it no longer fits. He was unable to fill in where he got it. HOME SUPPORT PRIOR TO ADMISSION: The patient lived alone with friend to provide assistance. Physical Therapy Goals  Initiated 10/17/2022  1. Patient will move from supine to sit and sit to supine , scoot up and down, and roll side to side in bed with modified independence within 7 day(s). 2.  Patient will transfer from bed to chair and chair to bed with minimal assistance/contact guard assist using the least restrictive device within 7 day(s). 3.  Patient will show good sitting balance static and dynamic for safe bed mobility and transfers within 7 days. Physical Therapy Goal  Reassessed 11/8/2022 goals appropriate for next 7 days  Re-Assessed 10/28/2022  1. Patient will move from supine to sit and sit to supine , scoot up and down, and roll side to side in bed with minimal assistance within 7 day(s). 2.  Patient will transfer from bed to chair and chair to bed with moderate assist using the least restrictive device within 7 day(s). 3.  Patient will show good sitting balance static and dynamic for safe bed mobility and transfers within 7 days.   4.  Patient will demonstrate 5 exercises without verbal cueing to improve UE/LE ROM and strength within 7 days. Outcome: Progressing Towards Goal   PHYSICAL THERAPY TREATMENT  Patient: Salomon Jacome (58 y.o. male)  Date: 11/11/2022  Diagnosis: COPD with acute exacerbation ([facility] [address] [J44.1] <principal problem not specified>      Precautions: Fall, Contact (ESBL wound)  Chart, physical therapy assessment, plan of care and goals were reviewed. ASSESSMENT  Patient continues with skilled PT services and is slowly progressing towards goals. Pt agreeable to EOB activity this day. Transitions to EOB with Min A and requiring HOB to be full elevated. Sits EOB >15 minutes with no LOB and intact sitting balance. Pt engages in minimal seated exercises and weight shifting while at EOB. Demos ability to complete small ROM in LLE. Pt states he was supposed to meet with a prosthetists but he was in the hospital at the time. Would benefit from SNF though pt is adamant he wants to go to [facility]. Current Level of Function Impacting Discharge (mobility/balance): only sits EOB with therapy    Other factors to consider for discharge: limited participation and progress with therapy acutely         PLAN :  Patient continues to benefit from skilled intervention to address the above impairments. Continue treatment per established plan of care. to address goals. Recommendation for discharge: (in order for the patient to meet his/her long term goals)  Therapy up to 5 days/week in SNF setting    This discharge recommendation:  Has been made in collaboration with the attending provider and/or case management    IF patient discharges home will need the following DME: hospital bed, mechanical lift, wheelchair, sliding/transfer board, and power wheelchair       SUBJECTIVE:   Patient stated I am going to get a leg.     OBJECTIVE DATA SUMMARY:   Critical Behavior:  Neurologic State: Alert  Orientation Level: Oriented X4  Cognition: Follows commands  Safety/Judgement: Awareness of environment  Functional Mobility Training:  Bed Mobility:     Supine to Sit: Minimum assistance              Transfers:                                   Balance:  Sitting: Impaired  Sitting - Static: Good (unsupported)  Sitting - Dynamic: Fair (occasional)  Ambulation/Gait Training:                                                    Activity Tolerance:   Fair    After treatment patient left in no apparent distress:   Supine in bed and Call bell within reach    COMMUNICATION/COLLABORATION:   The patients plan of care was discussed with: Registered nurse.      Rosa [place], PT   Time Calculation: 27 mins

## 2022-11-11 NOTE — PROGRESS NOTES
RUR- 13%  Disposition: [facility] - insurance denied - offering p2p     Transition of Care Plan:     RUR:13%  Disposition:[facility]  Follow up appointments:rehab  DME needed:new hospital bed with [facility] and [facility], [facility]- left VM   Transportation at Discharge: [address] [place] [address] or means to access home:   patient/friend     IM Medicare Letter:n/a  Is patient a [place] and connected with the [place]? N/a  If yes, was [place] transfer form completed and [facility] notified? Caregiver Contact:Venkata Waldron [phone]  Discharge Caregiver contacted prior to discharge? Per patient  Care Conference needed?:    no    Per [facility] insurance has denied IPR - offering P2P to be done by 11/18 ph# [phone] re#  [phone]. Attending made aware. JUVENCIO Pairsi      CM left VM regarding Hospital bed ordering with Dami Borrego with [employer] and Mob[employer]. CM to also re-send a referral to [facility] SN and PT/OT and need to confirm Dr Michael Vang [phone] can follow patient for home care as patient does not currently have a PCP. CM will await outcome of P2P and follow-up next week.  JUVENCIO Parisi

## 2022-11-11 NOTE — PROGRESS NOTES
Problem: Self Care Deficits Care Plan (Adult)  Goal: *Acute Goals and Plan of Care (Insert Text)  Description: FUNCTIONAL STATUS PRIOR TO ADMISSION: had L AKA at [facility] 5 weeks ago, female friend has been assisting with sliding board transfers or stand pivot transfers to and from power wheelchair or BSC SBA, sponge bathing with bath wipes with assist for RLE, able to perform UB ADLS with set up, assist with toileting and LB dressing, had recently stopped using grace lift due to improved independence with transfers, going to OP PT/OT at [facility] and they were setting up aide services, pt needs a new hospital bed due to it being in disrepair, uses power chair for mobility independently with RUE as LUE is non functional from old [address]: The patient lived alone with a friend to provide assistance. Pt reports his friend has been assisting since d/c from [facility] and that she may not be able to continue to assist long term    Occupational Therapy Goals:  Initiated 10/17/2022  Weekly reevaluation/reevaluation post procedure 11/1/2022: no goals met. Goals reviewed on 11/9/22 during weekly re-assessment and updated as per below:  1. Patient will perform grooming seated with good balance with noe technique and supervision implements provided within 7 days. - not met, continue this goal  2. Patient will perform upper body dressing with supervision/set-up within 7 days. - not met, continue this goal  3. Patient will perform toileting with moderate assistance  within 7 days. - not met, continue this goal  4. Patient will perform sponge bath seated with minimal assist within 7 days. - not met, continue this goal    5. Patient will transfer from drop arm bedside commode or drop arm recliner with moderate assistance with best technique within 7 days.  - not met, continue this goal    Outcome: Progressing Towards Goal    OCCUPATIONAL THERAPY TREATMENT  Patient: Chandana Che (40 y.o. male)  Date: 11/11/2022  Diagnosis: COPD with acute exacerbation ([facility] [address] [J44.1] <principal problem not specified>      Precautions: Fall, Contact (ESBL wound)  Chart, occupational therapy assessment, plan of care, and goals were reviewed. ASSESSMENT  Patient continues with skilled OT services and is very slowly progressing towards goals. Pt noted good static seated balance with no noted LOB during EOB self-feeding and grooming, requiring Min A to come to EOB; however, requiring HOB to be elevated to highest level. Pt reports desires for IPR upon discharge; however, reporting therapist believes pt would benefit from SNF rehab with possible transition to LTC given limited therapeutic participation/gains during current hospitalization. Acute OT to continue to follow during acute hospitalization. Current Level of Function Impacting Discharge (ADLs): Mod A at bed level    Other factors to consider for discharge: limited therapeutic progression, unable to progress to OOB activity         PLAN :  Patient continues to benefit from skilled intervention to address the above impairments. Continue treatment per established plan of care to address goals. Recommend with staff: Frequent positional changed, bed level ADL engagement    Recommend next OT session: POC progression    Recommendation for discharge: (in order for the patient to meet his/her long term goals)  To be determined: Pt desires IPR, reporting therapist recommending SNF with possible transition to LTC    This discharge recommendation:  Has been made in collaboration with the attending provider and/or case management    IF patient discharges home will need the following DME: TBD- pt not safe for home at this time       SUBJECTIVE:   Patient stated Put the head of the bed up, it helps me to get to the edge.     OBJECTIVE DATA SUMMARY:   Cognitive/Behavioral Status:  Neurologic State: Alert  Orientation Level: Oriented X4  Cognition: Follows commands  Perception: Appears intact  Perseveration: No perseveration noted  Safety/Judgement: Awareness of environment    Functional Mobility and Transfers for ADLs:  Bed Mobility:  Supine to Sit: Minimum assistance;Bed Modified; Additional time (with HOB elevated to partial EOB sitting)    Balance:  Sitting: Impaired  Sitting - Static: Good (unsupported)  Sitting - Dynamic: Fair (occasional)    ADL Intervention:  Feeding  Feeding Assistance: Set-up    Grooming  Grooming Assistance: Moderate assistance (to apply deodorant at EOB)    Upper Body [address]: Minimum  assistance (for LUE management)    Cognitive Retraining  Safety/Judgement: Awareness of environment    Pain:  Pt with c/o lower back pain, did not quantify; RN aware and following    Activity Tolerance:   Fair and requires rest breaks    After treatment patient left in no apparent distress:   Supine in bed, Call bell within reach, Bed / chair alarm activated, Side rails x 3, and RN present and following    COMMUNICATION/COLLABORATION:   The patients plan of care was discussed with: Physical therapist, Registered nurse, and Case management.      Jazz Trinidad OT  Time Calculation: 27 mins

## 2022-11-12 PROCEDURE — 74011250637 HC RX REV CODE- 250/637: Performed by: STUDENT IN AN ORGANIZED HEALTH CARE EDUCATION/TRAINING PROGRAM

## 2022-11-12 PROCEDURE — 65270000046 HC RM TELEMETRY

## 2022-11-12 PROCEDURE — 74011250636 HC RX REV CODE- 250/636: Performed by: STUDENT IN AN ORGANIZED HEALTH CARE EDUCATION/TRAINING PROGRAM

## 2022-11-12 PROCEDURE — 74011000250 HC RX REV CODE- 250: Performed by: STUDENT IN AN ORGANIZED HEALTH CARE EDUCATION/TRAINING PROGRAM

## 2022-11-12 PROCEDURE — 74011250637 HC RX REV CODE- 250/637: Performed by: INTERNAL MEDICINE

## 2022-11-12 PROCEDURE — 74011250637 HC RX REV CODE- 250/637: Performed by: PHYSICIAN ASSISTANT

## 2022-11-12 RX ORDER — OXYCODONE HYDROCHLORIDE 5 MG/1
40 TABLET ORAL
Status: DISCONTINUED | OUTPATIENT
Start: 2022-11-12 | End: 2022-11-20 | Stop reason: HOSPADM

## 2022-11-12 RX ORDER — GABAPENTIN 300 MG/1
300 CAPSULE ORAL 3 TIMES DAILY
Status: DISCONTINUED | OUTPATIENT
Start: 2022-11-12 | End: 2022-11-20 | Stop reason: HOSPADM

## 2022-11-12 RX ADMIN — GABAPENTIN 300 MG: 300 CAPSULE ORAL at 21:45

## 2022-11-12 RX ADMIN — CASTOR OIL AND BALSAM, PERU: 788; 87 OINTMENT TOPICAL at 09:00

## 2022-11-12 RX ADMIN — SODIUM CHLORIDE, PRESERVATIVE FREE 10 ML: 5 INJECTION INTRAVENOUS at 14:00

## 2022-11-12 RX ADMIN — OXYCODONE 40 MG: 5 TABLET ORAL at 17:59

## 2022-11-12 RX ADMIN — OXYCODONE 30 MG: 5 TABLET ORAL at 07:40

## 2022-11-12 RX ADMIN — DOXAZOSIN MESYLATE 2 MG: 2 TABLET ORAL at 09:02

## 2022-11-12 RX ADMIN — ALLOPURINOL 100 MG: 100 TABLET ORAL at 09:02

## 2022-11-12 RX ADMIN — CARVEDILOL 3.12 MG: 3.12 TABLET, FILM COATED ORAL at 18:00

## 2022-11-12 RX ADMIN — COLCHICINE 0.6 MG: 0.6 TABLET, FILM COATED ORAL at 10:25

## 2022-11-12 RX ADMIN — LEVOTHYROXINE SODIUM 125 MCG: 0.12 TABLET ORAL at 09:02

## 2022-11-12 RX ADMIN — PANTOPRAZOLE SODIUM 40 MG: 40 TABLET, DELAYED RELEASE ORAL at 09:02

## 2022-11-12 RX ADMIN — OXYCODONE 30 MG: 5 TABLET ORAL at 11:51

## 2022-11-12 RX ADMIN — SODIUM CHLORIDE, PRESERVATIVE FREE 10 ML: 5 INJECTION INTRAVENOUS at 06:18

## 2022-11-12 RX ADMIN — TIZANIDINE 2 MG: 2 TABLET ORAL at 21:46

## 2022-11-12 RX ADMIN — ACETAMINOPHEN 650 MG: 325 TABLET ORAL at 18:00

## 2022-11-12 RX ADMIN — ENOXAPARIN SODIUM 30 MG: 100 INJECTION SUBCUTANEOUS at 09:01

## 2022-11-12 RX ADMIN — CALCIUM CARBONATE (ANTACID) CHEW TAB 500 MG 200 MG: 500 CHEW TAB at 11:51

## 2022-11-12 RX ADMIN — ACETAMINOPHEN 650 MG: 325 TABLET ORAL at 06:18

## 2022-11-12 RX ADMIN — CARVEDILOL 3.12 MG: 3.12 TABLET, FILM COATED ORAL at 09:02

## 2022-11-12 RX ADMIN — TAMSULOSIN HYDROCHLORIDE 0.4 MG: 0.4 CAPSULE ORAL at 09:02

## 2022-11-12 RX ADMIN — CALCIUM CARBONATE (ANTACID) CHEW TAB 500 MG 200 MG: 500 CHEW TAB at 18:00

## 2022-11-12 RX ADMIN — SODIUM CHLORIDE, PRESERVATIVE FREE 10 ML: 5 INJECTION INTRAVENOUS at 21:46

## 2022-11-12 RX ADMIN — TIZANIDINE 2 MG: 2 TABLET ORAL at 18:00

## 2022-11-12 RX ADMIN — ACETAMINOPHEN 650 MG: 325 TABLET ORAL at 11:51

## 2022-11-12 RX ADMIN — GABAPENTIN 100 MG: 300 CAPSULE ORAL at 09:02

## 2022-11-12 RX ADMIN — ACETAMINOPHEN 650 MG: 325 TABLET ORAL at 00:02

## 2022-11-12 RX ADMIN — CALCIUM CARBONATE (ANTACID) CHEW TAB 500 MG 200 MG: 500 CHEW TAB at 09:02

## 2022-11-12 RX ADMIN — METHADONE HYDROCHLORIDE 60 MG: 10 CONCENTRATE ORAL at 09:02

## 2022-11-12 RX ADMIN — OXYCODONE 40 MG: 5 TABLET ORAL at 21:45

## 2022-11-12 RX ADMIN — TIZANIDINE 2 MG: 2 TABLET ORAL at 09:02

## 2022-11-12 RX ADMIN — GABAPENTIN 300 MG: 300 CAPSULE ORAL at 18:00

## 2022-11-12 NOTE — PROGRESS NOTES
Hospitalist Progress Note    NAME: Sofie Lal   :  1965   MRN:  [id number]       Assessment / Plan:    Leukocytosis WBC 20.9 on 11/10/2022 suspect steroid demargination  Normal WBC 8.0 on 11/3/2022  Started course of decadron 11/3, stopped 24 hours ago  No fevers or SIRS criteria  Suspect demargination  CXR with no new ASD   BC with fevers  Procalcitonin < 0.05  Hold antibiotics    Intractable back pain   T5 and T6 fracture POA  MRI 10/24/ 2022  Increased subacute T5 partial compression fracture. Unchanged subacute T6 partial compression fracture. Bony retropulsion at T6        causes mild central spinal canal stenosis without cord compression. 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 Neurosurgery Dr. Nunu Westfall saw, doesn't think he needs surgical intervention   doesn't have significant weakness of the RLE  Discussed with Radiology, recommended doing CT thoracic spine  CT reviewed, looks like cement displaced. No treatment as such for this  Pain remains uncontrolled    Continue methadone 60 mg daily  Increase oxycodone to 40 mg q4 PRN  Continue lidocaine patch  Continue scheduled tylenol  Increase neurontin to 300 mg TID  PT/OT recommending IPR. [facility] denied, appeal pending    [phone], Ref [id number]  S/p decadron taper  Medically ready pending placement     Carbapenem resistant urinary tract infection POA  Recent Rgoss catheter and also straight cath  Urine culture grew Carbapenem resistant Klebsiella pneumonia  ID consulted, continued Gentamicin through 10/28/22, course completed     Suspected aspiration pneumonia  Acute hypoxic respiratory failure  MRI shows evidence of aspiration pneumonia.     Chest x-ray also shows basilar infiltrates with possible effusion  Started empiric antibiotics with cefepime and Flagyl, started 10/25/22  Changed to Cefdinir flagyl, EOT 10/31/22, 7 day course completed. Aspiration precautions. Procal normal  Now weaned off oxygen  Pulmonary evaluated and signed off  Followup CT in 1 year for 3mm pulm nodule     Possible gout flare - improved  Describes knee pain, similar to previous gout flare  Cw Colchicine now on 0.6 daily  Started allopurinol 100 daily. Chronic pain on methadone  Continue home methadone. Rest of pain meds as above    Debility secondary to CVA and recent left AKA  Continue PT OT     Chronic wounds  Continue wound care     Mild COPD exacerbation  Prn duonebs  S/p prednisone    GERD  Protonix daily     Essential hypertension  Continue PTA Coreg     Hypothyroidism  Continue PTA Synthroid     Left AKA     30.0 - 39.9 Obese / Body mass index is 30.79 kg/m². Code status: Full  Prophylaxis: Lovenox  Recommended Disposition: SNF  BARBARA: Medically ready  Barriers: IPR placement    Subjective:     Chief Complaint / Reason for Physician Visit  Follow up for vertebral fracture  \"The pain is unbearable\" severe mid back pain  [facility] denied, appear pending    Review of Systems:  Symptom Y/N Comments  Symptom Y/N Comments   Fever/Chills n   Chest Pain n    Poor Appetite n   Edema n    Cough n   Abdominal Pain     Sputum    Joint Pain y Back pain   SOB/BECK n   Pruritis/Rash     Nausea/vomit n   Tolerating PT/OT     Diarrhea n   Tolerating Diet y    Constipation    Other       Could NOT obtain due to:      Objective:     VITALS:   Last 24hrs VS reviewed since prior progress note.  Most recent are:  Patient Vitals for the past 24 hrs:   Temp Pulse Resp BP SpO2   11/12/22 0858 98.4 °F (36.9 °C) (!) 58 18 100/65 92 %   11/11/22 2037 98.4 °F (36.9 °C) (!) 59 18 (!) 108/54 93 %   11/11/22 2000 -- -- -- -- 94 %         Intake/Output Summary (Last 24 hours) at 11/12/2022 1357  Last data filed at 11/11/2022 1457  Gross per 24 hour   Intake 400 ml   Output --   Net 400 ml          I had a face to face encounter and independently examined this patient on 11/12/2022, as outlined below:  PHYSICAL EXAM:  General: Awake, No acute distress  EENT:  Anicteric sclerae. MMM  Resp:  CTA bilaterally, no wheezing or rales. No accessory muscle use  CV:  Regular  rhythm,  No edema  GI:  Soft, Non distended, Non tender. +Bowel sounds  Neurologic:  Alert and oriented X 3, normal speech, Power 4/5 on RLE, left AKA (has prior weakness)  Psych:   Not anxious nor agitated      Reviewed most current lab test results and cultures  YES  Reviewed most current radiology test results   YES  Review and summation of old records today    NO  Reviewed patient's current orders and MAR    YES  PMH/SH reviewed - no change compared to H&P  ________________________________________________________________________  Care Plan discussed with:    Comments   Patient y    Family      RN y    Care Manager     Consultant                       y Multidiciplinary team rounds were held today with , nursing, pharmacist and clinical coordinator. Patient's plan of care was discussed; medications were reviewed and discharge planning was addressed. ________________________________________________________________________        Comments   >50% of visit spent in counseling and coordination of care     ________________________________________________________________________  Kimmy Newton MD     Procedures: see electronic medical records for all procedures/Xrays and details which were not copied into this note but were reviewed prior to creation of Plan. LABS:  I reviewed today's most current labs and imaging studies.   Pertinent labs include:  Recent Labs     11/11/22  1036 11/10/22  1404   WBC 18.2* 20.9*   HGB 10.8* 10.8*   HCT 34.9* 34.3*    382         Recent Labs     11/10/22  1404 11/10/22  1037     --    K 3.2* --      --    CO2 27  --    *  --    BUN 23*  --    CREA 0.77  --    CA 8.1*  --    PHOS  --  3.5           Signed: Ned Joyner MD

## 2022-11-12 NOTE — PROGRESS NOTES
Transition of Care Plan:     RUR:13%    BARBARA: TBD:   Disposition:DWAYNE IPR  11 AM  Per DWAYNE insurance has denied IPR - offering P2P to be done by 11/18 # [phone] re#  [id number]. Attending made aware yesterday. 11:35 AM- CM sent perfect serve to MD to see if we are moving forward with P2P or not. 2:30 PM - CM noted in MD chart that P2P appeal is pending. Pt has Medicaid so SNF is not an option     Plan B:   Home with HH. ? Per chart: CM left VM regarding Hospital bed ordering with [employer] with [employer] and [employer]. CM to also re-send a referral to [facility] for [facility] and PT/OT and need to confirm Dr Maria Esther Guardado [phone] can follow patient for home care as patient does not currently have a PCP. CM will await outcome of P2P and follow-up next week. Follow up appointments:rehab  DME needed:new hospital bed with trapeze and mattress/pad      [facility]- left      Transportation at Discharge: [address] [place] [address] or means to access home:   patient/friend      Medicare Letter:n/a  Is patient a [place] and connected with the [address] [facility]? N/a  If yes, was [employer] transfer form completed and VA notified? Caregiver Contact:Venkata Aguilar [phone]  Discharge Caregiver contacted prior to discharge? Per patient  Care Conference needed?:    no    CM will continue to monitor discharge plan.      Gaurang Hoang, [address] [facility]

## 2022-11-12 NOTE — PROGRESS NOTES
Hospitalist Progress Note    NAME: Ming Etienne   :  1965   MRN:  [id number]       Assessment / Plan:    Leukocytosis WBC 20.9 on 11/10/2022  Normal WBC 8.0 on 11/3/2022  Started course of decadron 11/3, stopped 24 hours ago  No fevers or SIRS criteria  Suspect demargination  CXR with no new ASD   BC with fevers  Procalcitonin < 0.05  Hold antibiotics    Intractable back pain   T5 and T6 fracture POA  MRI 10/24/ 2022  Increased subacute T5 partial compression fracture. Unchanged subacute T6 partial compression fracture. Bony retropulsion at T6        causes mild central spinal canal stenosis without cord compression. 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 Neurosurgery Dr. John Rivero saw, doesn't think he needs surgical intervention   doesn't have significant weakness of the RLE  Discussed with Radiology, recommended doing CT thoracic spine  CT reviewed, looks like cement displaced. No treatment as such for this  Pain control with , methadone 60 mg daily, oxycodone PRN. PT/OT recommending IPR. [facility]. Tapering decadron, now off  Medically ready pending placement     Carbapenem resistant urinary tract infection POA  Recent Gross catheter and also straight cath  Urine culture grew Carbapenem resistant Klebsiella pneumonia  ID consulted, continued Gentamicin through 10/28/22, course completed     Suspected aspiration pneumonia  Acute hypoxic respiratory failure  MRI shows evidence of aspiration pneumonia. Chest x-ray also shows basilar infiltrates with possible effusion  Started empiric antibiotics with cefepime and Flagyl, started 10/25/22  Changed to Cefdinir flagyl, EOT 10/31/22, 7 day course completed. Aspiration precautions.   Procal normal  Now weaned off oxygen  Pulmonary evaluated and signed off  Followup CT in 1 year for 3mm pulm nodule     Possible gout flare - improved  Describes knee pain, similar to previous gout flare  Cw Colchicine now on 0.6 daily  Started allopurinol 100 daily. Chronic pain on methadone  Continue home methadone. Rest of pain meds as above    Debility secondary to CVA and recent left AKA  Continue PT OT     Chronic wounds  Continue wound care     Mild COPD exacerbation  Prn duonebs  S/p prednisone    GERD  Protonix daily     Essential hypertension  Continue PTA Coreg     Hypothyroidism  Continue PTA Synthroid     Left AKA     30.0 - 39.9 Obese / Body mass index is 30.79 kg/m². Code status: Full  Prophylaxis: Lovenox  Recommended Disposition: SNF  BARBARA: Medically ready  Barriers: IPR placement    Subjective:     Chief Complaint / Reason for Physician Visit  Follow up for vertebral fracture  \"Pain is about the same\" despite increased oxycodone  Awaiting rehab approval  Asking to increase pain meds    Review of Systems:  Symptom Y/N Comments  Symptom Y/N Comments   Fever/Chills n   Chest Pain n    Poor Appetite n   Edema n    Cough n   Abdominal Pain     Sputum    Joint Pain y Back pain   SOB/BECK n   Pruritis/Rash     Nausea/vomit n   Tolerating PT/OT     Diarrhea n   Tolerating Diet y    Constipation    Other       Could NOT obtain due to:      Objective:     VITALS:   Last 24hrs VS reviewed since prior progress note.  Most recent are:  Patient Vitals for the past 24 hrs:   Temp Pulse Resp BP SpO2   11/11/22 2037 98.4 °F (36.9 °C) (!) 59 18 (!) 108/54 93 %   11/11/22 0745 97.9 °F (36.6 °C) (!) 41 18 110/72 94 %   11/11/22 0026 98.2 °F (36.8 °C) 71 18 108/74 93 %         Intake/Output Summary (Last 24 hours) at 11/11/2022 2339  Last data filed at 11/11/2022 1457  Gross per 24 hour   Intake 400 ml   Output --   Net 400 ml          I had a face to face encounter and independently examined this patient on 11/11/2022, as outlined below:  PHYSICAL EXAM:  General: Awake, No acute distress  EENT:  EOMI. Anicteric sclerae. MMM  Resp:  CTA bilaterally, no wheezing or rales. No accessory muscle use  CV:  Regular  rhythm,  No edema  GI:  Soft, Non distended, Non tender. +Bowel sounds  Neurologic:  Alert and oriented X 3, normal speech,   Psych:   Not anxious nor agitated  Ext: Power 4/5 on RLE, left AKA (has prior weakness)  Power 5/5 on RUE and has residual weakness on left    Reviewed most current lab test results and cultures  YES  Reviewed most current radiology test results   YES  Review and summation of old records today    NO  Reviewed patient's current orders and MAR    YES  PMH/SH reviewed - no change compared to H&P  ________________________________________________________________________  Care Plan discussed with:    Comments   Patient y    Family      RN y    Care Manager     Consultant                       y Multidiciplinary team rounds were held today with , nursing, pharmacist and clinical coordinator. Patient's plan of care was discussed; medications were reviewed and discharge planning was addressed. ________________________________________________________________________        Comments   >50% of visit spent in counseling and coordination of care     ________________________________________________________________________  More Inman MD     Procedures: see electronic medical records for all procedures/Xrays and details which were not copied into this note but were reviewed prior to creation of Plan. LABS:  I reviewed today's most current labs and imaging studies.   Pertinent labs include:  Recent Labs     11/11/22  1036 11/10/22  1404   WBC 18.2* 20.9*   HGB 10.8* 10.8*   HCT 34.9* 34.3*    382         Recent Labs     11/10/22  1404 11/10/22  1037     --    K 3.2*  --      --    CO2 27  --    *  --    BUN 23*  --    CREA 0.77  --    CA 8.1*  --    PHOS  -- 3.5           Signed: Prerna Harrington MD

## 2022-11-13 LAB
BASOPHILS # BLD: 0 K/UL (ref 0–0.1)
BASOPHILS NFR BLD: 0 % (ref 0–1)
DIFFERENTIAL METHOD BLD: ABNORMAL
EOSINOPHIL # BLD: 1 K/UL (ref 0–0.4)
EOSINOPHIL NFR BLD: 6 % (ref 0–7)
ERYTHROCYTE [DISTWIDTH] IN BLOOD BY AUTOMATED COUNT: 17.2 % (ref 11.5–14.5)
HCT VFR BLD AUTO: 32.9 % (ref 36.6–50.3)
HGB BLD-MCNC: 10 G/DL (ref 12.1–17)
IMM GRANULOCYTES # BLD AUTO: 0 K/UL (ref 0–0.04)
IMM GRANULOCYTES NFR BLD AUTO: 0 % (ref 0–0.5)
LYMPHOCYTES # BLD: 1.9 K/UL (ref 0.8–3.5)
LYMPHOCYTES NFR BLD: 11 % (ref 12–49)
MCH RBC QN AUTO: 23.9 PG (ref 26–34)
MCHC RBC AUTO-ENTMCNC: 30.4 G/DL (ref 30–36.5)
MCV RBC AUTO: 78.7 FL (ref 80–99)
METAMYELOCYTES NFR BLD MANUAL: 1 %
MONOCYTES # BLD: 1.9 K/UL (ref 0–1)
MONOCYTES NFR BLD: 11 % (ref 5–13)
MYELOCYTES NFR BLD MANUAL: 2 %
NEUTS SEG # BLD: 12 K/UL (ref 1.8–8)
NEUTS SEG NFR BLD: 69 % (ref 32–75)
NRBC # BLD: 0 K/UL (ref 0–0.01)
NRBC BLD-RTO: 0 PER 100 WBC
PHOSPHATE SERPL-MCNC: 4.2 MG/DL (ref 2.6–4.7)
PLATELET # BLD AUTO: 380 K/UL (ref 150–400)
PMV BLD AUTO: 10.7 FL (ref 8.9–12.9)
RBC # BLD AUTO: 4.18 M/UL (ref 4.1–5.7)
RBC MORPH BLD: ABNORMAL
RBC MORPH BLD: ABNORMAL
WBC # BLD AUTO: 17.4 K/UL (ref 4.1–11.1)

## 2022-11-13 PROCEDURE — 74011250637 HC RX REV CODE- 250/637: Performed by: STUDENT IN AN ORGANIZED HEALTH CARE EDUCATION/TRAINING PROGRAM

## 2022-11-13 PROCEDURE — 74011250637 HC RX REV CODE- 250/637: Performed by: PHYSICIAN ASSISTANT

## 2022-11-13 PROCEDURE — 74011250636 HC RX REV CODE- 250/636: Performed by: INTERNAL MEDICINE

## 2022-11-13 PROCEDURE — 74011250637 HC RX REV CODE- 250/637: Performed by: INTERNAL MEDICINE

## 2022-11-13 PROCEDURE — 74011000250 HC RX REV CODE- 250: Performed by: STUDENT IN AN ORGANIZED HEALTH CARE EDUCATION/TRAINING PROGRAM

## 2022-11-13 PROCEDURE — 85025 COMPLETE CBC W/AUTO DIFF WBC: CPT

## 2022-11-13 PROCEDURE — 74011250636 HC RX REV CODE- 250/636: Performed by: STUDENT IN AN ORGANIZED HEALTH CARE EDUCATION/TRAINING PROGRAM

## 2022-11-13 PROCEDURE — 65270000046 HC RM TELEMETRY

## 2022-11-13 RX ORDER — KETOROLAC TROMETHAMINE 30 MG/ML
30 INJECTION, SOLUTION INTRAMUSCULAR; INTRAVENOUS
Status: DISCONTINUED | OUTPATIENT
Start: 2022-11-13 | End: 2022-11-13

## 2022-11-13 RX ORDER — KETOROLAC TROMETHAMINE 30 MG/ML
30 INJECTION, SOLUTION INTRAMUSCULAR; INTRAVENOUS
Status: COMPLETED | OUTPATIENT
Start: 2022-11-13 | End: 2022-11-13

## 2022-11-13 RX ADMIN — TIZANIDINE 2 MG: 2 TABLET ORAL at 17:28

## 2022-11-13 RX ADMIN — CALCIUM CARBONATE (ANTACID) CHEW TAB 500 MG 200 MG: 500 CHEW TAB at 13:35

## 2022-11-13 RX ADMIN — SODIUM CHLORIDE, PRESERVATIVE FREE 10 ML: 5 INJECTION INTRAVENOUS at 17:18

## 2022-11-13 RX ADMIN — CALCIUM CARBONATE (ANTACID) CHEW TAB 500 MG 200 MG: 500 CHEW TAB at 09:05

## 2022-11-13 RX ADMIN — ACETAMINOPHEN 650 MG: 325 TABLET ORAL at 13:36

## 2022-11-13 RX ADMIN — LEVOTHYROXINE SODIUM 125 MCG: 0.12 TABLET ORAL at 06:45

## 2022-11-13 RX ADMIN — KETOROLAC TROMETHAMINE 30 MG: 30 INJECTION, SOLUTION INTRAMUSCULAR at 17:28

## 2022-11-13 RX ADMIN — GABAPENTIN 300 MG: 300 CAPSULE ORAL at 09:05

## 2022-11-13 RX ADMIN — MELATONIN 3 MG: at 02:45

## 2022-11-13 RX ADMIN — ALLOPURINOL 100 MG: 100 TABLET ORAL at 09:05

## 2022-11-13 RX ADMIN — SODIUM CHLORIDE, PRESERVATIVE FREE 10 ML: 5 INJECTION INTRAVENOUS at 21:47

## 2022-11-13 RX ADMIN — OXYCODONE 40 MG: 5 TABLET ORAL at 13:35

## 2022-11-13 RX ADMIN — ACETAMINOPHEN 650 MG: 325 TABLET ORAL at 17:17

## 2022-11-13 RX ADMIN — CARVEDILOL 3.12 MG: 3.12 TABLET, FILM COATED ORAL at 17:17

## 2022-11-13 RX ADMIN — PANTOPRAZOLE SODIUM 40 MG: 40 TABLET, DELAYED RELEASE ORAL at 06:45

## 2022-11-13 RX ADMIN — CARVEDILOL 3.12 MG: 3.12 TABLET, FILM COATED ORAL at 09:05

## 2022-11-13 RX ADMIN — TIZANIDINE 2 MG: 2 TABLET ORAL at 09:05

## 2022-11-13 RX ADMIN — COLCHICINE 0.6 MG: 0.6 TABLET, FILM COATED ORAL at 09:18

## 2022-11-13 RX ADMIN — MELATONIN 3 MG: at 21:41

## 2022-11-13 RX ADMIN — GABAPENTIN 300 MG: 300 CAPSULE ORAL at 17:28

## 2022-11-13 RX ADMIN — ACETAMINOPHEN 650 MG: 325 TABLET ORAL at 06:45

## 2022-11-13 RX ADMIN — DOXAZOSIN MESYLATE 2 MG: 2 TABLET ORAL at 09:06

## 2022-11-13 RX ADMIN — SODIUM CHLORIDE, PRESERVATIVE FREE 10 ML: 5 INJECTION INTRAVENOUS at 05:15

## 2022-11-13 RX ADMIN — TAMSULOSIN HYDROCHLORIDE 0.4 MG: 0.4 CAPSULE ORAL at 09:06

## 2022-11-13 RX ADMIN — GABAPENTIN 300 MG: 300 CAPSULE ORAL at 21:41

## 2022-11-13 RX ADMIN — OXYCODONE 40 MG: 5 TABLET ORAL at 02:45

## 2022-11-13 RX ADMIN — ENOXAPARIN SODIUM 30 MG: 100 INJECTION SUBCUTANEOUS at 09:04

## 2022-11-13 RX ADMIN — OXYCODONE 40 MG: 5 TABLET ORAL at 17:17

## 2022-11-13 RX ADMIN — TIZANIDINE 2 MG: 2 TABLET ORAL at 21:41

## 2022-11-13 RX ADMIN — CASTOR OIL AND BALSAM, PERU: 788; 87 OINTMENT TOPICAL at 09:00

## 2022-11-13 RX ADMIN — METHADONE HYDROCHLORIDE 60 MG: 10 CONCENTRATE ORAL at 09:04

## 2022-11-13 RX ADMIN — OXYCODONE 40 MG: 5 TABLET ORAL at 09:05

## 2022-11-13 RX ADMIN — OXYCODONE 40 MG: 5 TABLET ORAL at 21:41

## 2022-11-13 RX ADMIN — CASTOR OIL AND BALSAM, PERU: 788; 87 OINTMENT TOPICAL at 02:45

## 2022-11-13 RX ADMIN — CALCIUM CARBONATE (ANTACID) CHEW TAB 500 MG 200 MG: 500 CHEW TAB at 17:17

## 2022-11-13 NOTE — PROGRESS NOTES
Transition of Care Plan:     RUR:13%     BARBARA: TBD:     Disposition:DWAYNE IPR  8:16 AM  Per DWAYNE insurance has denied IPR - offering P2P to be done by 11/18 # [phone] re# [id number]. MD note indicated that Peer to Peer Appeal is Pending. Will await that decision. Pt has Medicaid so SNF is not an option      Plan B:   Home with HH. ? Per chart: CM left VM regarding Hospital bed ordering with Aleja Hickman with [employer] and [employer]. CM to also re-send a [facility] and need to confirm Dr Valencia Bowen [phone] can follow patient for home care as patient does not currently have a PCP. CM will await outcome of P2P and follow-up next week. Follow up appointments:rehab  DME needed:new hospital bed with trapeze and mattress/pad      [facility]- left       Transportation at Discharge: [address] [place] [address] or means to access home:   patient/friend     IM Medicare Letter:n/a  Is patient a  and connected with the [place]? N/a  If yes, was [employer] transfer form completed and VA notified? Caregiver Contact:Venkata Marvin [phone]  Discharge Caregiver contacted prior to discharge? Per patient  Care Conference needed?:    no     CM will continue to monitor discharge plan.       Azalea Shi, [address] [facility]

## 2022-11-13 NOTE — PROGRESS NOTES
Reported blood pressure and temp to MD, taken this morning. No new orders received at this time. Will continue to monitor.

## 2022-11-13 NOTE — PROGRESS NOTES
Problem: Patient Education: Go to Patient Education Activity  Goal: Patient/Family Education  Outcome: Progressing Towards Goal     Problem: Patient Education: Go to Patient Education Activity  Goal: Patient/Family Education  Outcome: Progressing Towards Goal     Problem: Pressure Injury - Risk of  Goal: *Prevention of pressure injury  Description: Document Dejuan Scale and appropriate interventions in the flowsheet. Outcome: Progressing Towards Goal  Note: Pressure Injury Interventions:  Sensory Interventions: Assess changes in LOC    Moisture Interventions: Absorbent underpads    Activity Interventions: PT/OT evaluation    Mobility Interventions: Chair cushion, HOB 30 degrees or less    Nutrition Interventions: Document food/fluid/supplement intake    Friction and Shear Interventions: Apply protective barrier, creams and emollients                Problem: Patient Education: Go to Patient Education Activity  Goal: Patient/Family Education  Outcome: Progressing Towards Goal     Problem: Falls - Risk of  Goal: *Absence of Falls  Description: Document Jordon Fall Risk and appropriate interventions in the flowsheet.   Outcome: Progressing Towards Goal  Note: Fall Risk Interventions:  Mobility Interventions: PT Consult for mobility concerns    Mentation Interventions: Door open when patient unattended    Medication Interventions: Patient to call before getting OOB, Teach patient to arise slowly    Elimination Interventions: Call light in reach, Bed/chair exit alarm    History of Falls Interventions: Bed/chair exit alarm         Problem: Patient Education: Go to Patient Education Activity  Goal: Patient/Family Education  Outcome: Progressing Towards Goal     Problem: Pain  Goal: *Control of Pain  Outcome: Progressing Towards Goal     Problem: Patient Education: Go to Patient Education Activity  Goal: Patient/Family Education  Outcome: Progressing Towards Goal     Problem: Patient Education: Go to Patient Education Activity  Goal: Patient/Family Education  Outcome: Progressing Towards Goal   Progressing toward goals.

## 2022-11-14 ENCOUNTER — APPOINTMENT (OUTPATIENT)
Dept: CT IMAGING | Age: 57
DRG: 321 | End: 2022-11-14
Attending: INTERNAL MEDICINE
Payer: MEDICAID

## 2022-11-14 PROCEDURE — 65270000046 HC RM TELEMETRY

## 2022-11-14 PROCEDURE — 74011250636 HC RX REV CODE- 250/636: Performed by: STUDENT IN AN ORGANIZED HEALTH CARE EDUCATION/TRAINING PROGRAM

## 2022-11-14 PROCEDURE — 74011250637 HC RX REV CODE- 250/637: Performed by: STUDENT IN AN ORGANIZED HEALTH CARE EDUCATION/TRAINING PROGRAM

## 2022-11-14 PROCEDURE — 72128 CT CHEST SPINE W/O DYE: CPT

## 2022-11-14 PROCEDURE — 94760 N-INVAS EAR/PLS OXIMETRY 1: CPT

## 2022-11-14 PROCEDURE — 74011250637 HC RX REV CODE- 250/637: Performed by: PHYSICIAN ASSISTANT

## 2022-11-14 PROCEDURE — 74011000250 HC RX REV CODE- 250: Performed by: STUDENT IN AN ORGANIZED HEALTH CARE EDUCATION/TRAINING PROGRAM

## 2022-11-14 PROCEDURE — 74011250636 HC RX REV CODE- 250/636: Performed by: INTERNAL MEDICINE

## 2022-11-14 PROCEDURE — 74011250637 HC RX REV CODE- 250/637: Performed by: INTERNAL MEDICINE

## 2022-11-14 RX ORDER — KETOROLAC TROMETHAMINE 30 MG/ML
30 INJECTION, SOLUTION INTRAMUSCULAR; INTRAVENOUS
Status: COMPLETED | OUTPATIENT
Start: 2022-11-14 | End: 2022-11-14

## 2022-11-14 RX ADMIN — TIZANIDINE 2 MG: 2 TABLET ORAL at 08:35

## 2022-11-14 RX ADMIN — METHADONE HYDROCHLORIDE 60 MG: 10 CONCENTRATE ORAL at 08:34

## 2022-11-14 RX ADMIN — SODIUM CHLORIDE, PRESERVATIVE FREE 10 ML: 5 INJECTION INTRAVENOUS at 21:22

## 2022-11-14 RX ADMIN — ACETAMINOPHEN 650 MG: 325 TABLET ORAL at 11:55

## 2022-11-14 RX ADMIN — CASTOR OIL AND BALSAM, PERU: 788; 87 OINTMENT TOPICAL at 21:21

## 2022-11-14 RX ADMIN — CARVEDILOL 3.12 MG: 3.12 TABLET, FILM COATED ORAL at 08:35

## 2022-11-14 RX ADMIN — ACETAMINOPHEN 650 MG: 325 TABLET ORAL at 18:14

## 2022-11-14 RX ADMIN — CASTOR OIL AND BALSAM, PERU: 788; 87 OINTMENT TOPICAL at 08:36

## 2022-11-14 RX ADMIN — GABAPENTIN 300 MG: 300 CAPSULE ORAL at 08:35

## 2022-11-14 RX ADMIN — KETOROLAC TROMETHAMINE 30 MG: 30 INJECTION, SOLUTION INTRAMUSCULAR at 19:51

## 2022-11-14 RX ADMIN — PANTOPRAZOLE SODIUM 40 MG: 40 TABLET, DELAYED RELEASE ORAL at 06:26

## 2022-11-14 RX ADMIN — ACETAMINOPHEN 650 MG: 325 TABLET ORAL at 06:25

## 2022-11-14 RX ADMIN — CALCIUM CARBONATE (ANTACID) CHEW TAB 500 MG 200 MG: 500 CHEW TAB at 11:55

## 2022-11-14 RX ADMIN — LEVOTHYROXINE SODIUM 125 MCG: 0.12 TABLET ORAL at 06:26

## 2022-11-14 RX ADMIN — COLCHICINE 0.6 MG: 0.6 TABLET, FILM COATED ORAL at 08:41

## 2022-11-14 RX ADMIN — OXYCODONE 40 MG: 5 TABLET ORAL at 06:26

## 2022-11-14 RX ADMIN — DOXAZOSIN MESYLATE 2 MG: 2 TABLET ORAL at 08:35

## 2022-11-14 RX ADMIN — OXYCODONE 40 MG: 5 TABLET ORAL at 18:17

## 2022-11-14 RX ADMIN — CALCIUM CARBONATE (ANTACID) CHEW TAB 500 MG 200 MG: 500 CHEW TAB at 18:14

## 2022-11-14 RX ADMIN — CARVEDILOL 3.12 MG: 3.12 TABLET, FILM COATED ORAL at 18:14

## 2022-11-14 RX ADMIN — SODIUM CHLORIDE, PRESERVATIVE FREE 10 ML: 5 INJECTION INTRAVENOUS at 06:32

## 2022-11-14 RX ADMIN — ALLOPURINOL 100 MG: 100 TABLET ORAL at 08:35

## 2022-11-14 RX ADMIN — SODIUM CHLORIDE, PRESERVATIVE FREE 10 ML: 5 INJECTION INTRAVENOUS at 18:15

## 2022-11-14 RX ADMIN — TIZANIDINE 2 MG: 2 TABLET ORAL at 18:14

## 2022-11-14 RX ADMIN — GABAPENTIN 300 MG: 300 CAPSULE ORAL at 21:21

## 2022-11-14 RX ADMIN — TAMSULOSIN HYDROCHLORIDE 0.4 MG: 0.4 CAPSULE ORAL at 08:35

## 2022-11-14 RX ADMIN — ONDANSETRON 4 MG: 2 INJECTION INTRAMUSCULAR; INTRAVENOUS at 18:30

## 2022-11-14 RX ADMIN — CALCIUM CARBONATE (ANTACID) CHEW TAB 500 MG 200 MG: 500 CHEW TAB at 08:35

## 2022-11-14 RX ADMIN — TIZANIDINE 2 MG: 2 TABLET ORAL at 21:21

## 2022-11-14 RX ADMIN — GABAPENTIN 300 MG: 300 CAPSULE ORAL at 18:14

## 2022-11-14 RX ADMIN — OXYCODONE 40 MG: 5 TABLET ORAL at 11:57

## 2022-11-14 NOTE — PROGRESS NOTES
Problem: Patient Education: Go to Patient Education Activity  Goal: Patient/Family Education  Outcome: Progressing Towards Goal     Problem: Patient Education: Go to Patient Education Activity  Goal: Patient/Family Education  Outcome: Progressing Towards Goal     Problem: Pressure Injury - Risk of  Goal: *Prevention of pressure injury  Description: Document Dejuan Scale and appropriate interventions in the flowsheet. Outcome: Progressing Towards Goal  Note: Pressure Injury Interventions:  Sensory Interventions: Assess changes in LOC    Moisture Interventions: Absorbent underpads    Activity Interventions: PT/OT evaluation    Mobility Interventions: PT/OT evaluation, Pressure redistribution bed/mattress (bed type)    Nutrition Interventions: Document food/fluid/supplement intake    Friction and Shear Interventions: Apply protective barrier, creams and emollients                Problem: Patient Education: Go to Patient Education Activity  Goal: Patient/Family Education  Outcome: Progressing Towards Goal     Problem: Falls - Risk of  Goal: *Absence of Falls  Description: Document Jordon Fall Risk and appropriate interventions in the flowsheet.   Outcome: Progressing Towards Goal  Note: Fall Risk Interventions:  Mobility Interventions: PT Consult for mobility concerns    Mentation Interventions: Door open when patient unattended    Medication Interventions: Patient to call before getting OOB, Teach patient to arise slowly    Elimination Interventions: Call light in reach, Bed/chair exit alarm    History of Falls Interventions: Bed/chair exit alarm         Problem: Patient Education: Go to Patient Education Activity  Goal: Patient/Family Education  Outcome: Progressing Towards Goal     Problem: Pain  Goal: *Control of Pain  Outcome: Progressing Towards Goal     Problem: Patient Education: Go to Patient Education Activity  Goal: Patient/Family Education  Outcome: Progressing Towards Goal     Problem: Patient Education: Go to Patient Education Activity  Goal: Patient/Family Education  Outcome: Progressing Towards Goal    Progressing toward established goals.

## 2022-11-15 PROCEDURE — 74011250637 HC RX REV CODE- 250/637: Performed by: STUDENT IN AN ORGANIZED HEALTH CARE EDUCATION/TRAINING PROGRAM

## 2022-11-15 PROCEDURE — 74011000250 HC RX REV CODE- 250: Performed by: STUDENT IN AN ORGANIZED HEALTH CARE EDUCATION/TRAINING PROGRAM

## 2022-11-15 PROCEDURE — 97535 SELF CARE MNGMENT TRAINING: CPT

## 2022-11-15 PROCEDURE — 65270000046 HC RM TELEMETRY

## 2022-11-15 PROCEDURE — 74011250637 HC RX REV CODE- 250/637: Performed by: INTERNAL MEDICINE

## 2022-11-15 PROCEDURE — 94760 N-INVAS EAR/PLS OXIMETRY 1: CPT

## 2022-11-15 PROCEDURE — 77010033678 HC OXYGEN DAILY

## 2022-11-15 RX ADMIN — DOXAZOSIN MESYLATE 2 MG: 2 TABLET ORAL at 09:17

## 2022-11-15 RX ADMIN — OXYCODONE 40 MG: 5 TABLET ORAL at 20:51

## 2022-11-15 RX ADMIN — OXYCODONE 40 MG: 5 TABLET ORAL at 16:29

## 2022-11-15 RX ADMIN — LEVOTHYROXINE SODIUM 125 MCG: 0.12 TABLET ORAL at 09:17

## 2022-11-15 RX ADMIN — TIZANIDINE 2 MG: 2 TABLET ORAL at 16:27

## 2022-11-15 RX ADMIN — ALLOPURINOL 100 MG: 100 TABLET ORAL at 09:17

## 2022-11-15 RX ADMIN — TAMSULOSIN HYDROCHLORIDE 0.4 MG: 0.4 CAPSULE ORAL at 09:17

## 2022-11-15 RX ADMIN — OXYCODONE 40 MG: 5 TABLET ORAL at 05:48

## 2022-11-15 RX ADMIN — GABAPENTIN 300 MG: 300 CAPSULE ORAL at 16:27

## 2022-11-15 RX ADMIN — COLCHICINE 0.6 MG: 0.6 TABLET, FILM COATED ORAL at 09:18

## 2022-11-15 RX ADMIN — GABAPENTIN 300 MG: 300 CAPSULE ORAL at 09:17

## 2022-11-15 RX ADMIN — CASTOR OIL AND BALSAM, PERU: 788; 87 OINTMENT TOPICAL at 20:55

## 2022-11-15 RX ADMIN — OXYCODONE 40 MG: 5 TABLET ORAL at 12:00

## 2022-11-15 RX ADMIN — ACETAMINOPHEN 650 MG: 325 TABLET ORAL at 11:58

## 2022-11-15 RX ADMIN — ACETAMINOPHEN 650 MG: 325 TABLET ORAL at 18:22

## 2022-11-15 RX ADMIN — ACETAMINOPHEN 650 MG: 325 TABLET ORAL at 05:48

## 2022-11-15 RX ADMIN — MELATONIN 3 MG: at 21:00

## 2022-11-15 RX ADMIN — TIZANIDINE 2 MG: 2 TABLET ORAL at 21:00

## 2022-11-15 RX ADMIN — METHADONE HYDROCHLORIDE 60 MG: 10 CONCENTRATE ORAL at 09:16

## 2022-11-15 RX ADMIN — CARVEDILOL 3.12 MG: 3.12 TABLET, FILM COATED ORAL at 16:27

## 2022-11-15 RX ADMIN — GABAPENTIN 300 MG: 300 CAPSULE ORAL at 21:00

## 2022-11-15 RX ADMIN — ACETAMINOPHEN 650 MG: 325 TABLET ORAL at 00:34

## 2022-11-15 RX ADMIN — TIZANIDINE 2 MG: 2 TABLET ORAL at 09:17

## 2022-11-15 RX ADMIN — PANTOPRAZOLE SODIUM 40 MG: 40 TABLET, DELAYED RELEASE ORAL at 09:17

## 2022-11-15 RX ADMIN — CASTOR OIL AND BALSAM, PERU: 788; 87 OINTMENT TOPICAL at 09:19

## 2022-11-15 RX ADMIN — CARVEDILOL 3.12 MG: 3.12 TABLET, FILM COATED ORAL at 09:17

## 2022-11-15 NOTE — PROGRESS NOTES
Problem: Self Care Deficits Care Plan (Adult)  Goal: *Acute Goals and Plan of Care (Insert Text)  Description: FUNCTIONAL STATUS PRIOR TO ADMISSION: had L AKA at [facility] 5 weeks ago, female friend has been assisting with sliding board transfers or stand pivot transfers to and from power wheelchair or BSC SBA, sponge bathing with bath wipes with assist for RLE, able to perform UB ADLS with set up, assist with toileting and LB dressing, had recently stopped using grace lift due to improved independence with transfers, going to OP PT[facility]OT at [facility] and they were setting up aide services, pt needs a new hospital bed due to it being in disrepair, uses power chair for mobility independently with RUE as LUE is non functional from old [address]: The patient lived alone with a friend to provide assistance. Pt reports his friend has been assisting since d/c from [address] [place] [address] and that she may not be able to continue to assist long term    Occupational Therapy Goals:  Initiated 10/17/2022  Weekly reevaluation/reevaluation post procedure 11/1/2022: no goals met. Goals reviewed on 11/9/22 during weekly re-assessment and updated as per below:  1. Patient will perform grooming seated with good balance with noe technique and supervision implements provided within 7 days. - not met, continue this goal  2. Patient will perform upper body dressing with supervision/set-up within 7 days. - not met, continue this goal  3. Patient will perform toileting with moderate assistance  within 7 days. - not met, continue this goal  4. Patient will perform sponge bath seated with minimal assist within 7 days. - not met, continue this goal    5. Patient will transfer from drop arm bedside commode or drop arm recliner with moderate assistance with best technique within 7 days.  - not met, continue this goal    Outcome: Not Progressing Towards Goal    OCCUPATIONAL THERAPY TREATMENT  Patient: Vineet Servin (24 y.o. male)  Date: 11/15/2022  Diagnosis: COPD with acute exacerbation ([facility] [J44.1] <principal problem not specified>      Precautions: Fall, Contact (ESBL wound)  Chart, occupational therapy assessment, plan of care, and goals were reviewed. ASSESSMENT  Patient continues with skilled OT services and is not progressing towards goals. Pt noted with 10/10 lower back and R shoulder pain and declined EOB activity this date; however, agreeable to long-sitting oral hygiene, with S/U. Pt verbalizes desires for IPR; however, reporting therapist believes he would benefit from SNF placement with possible transition to LTC given limited therapeutic gains during prolonged hospitalization and pt approaching functional plateau. Current Level of Function Impacting Discharge (ADLs): Up to Max A for bed level ADLs    Other factors to consider for discharge: pain, limited functional gains         PLAN :  Patient continues to benefit from skilled intervention to address the above impairments. Continue treatment per established plan of care to address goals. Recommend with staff: Frequent positional changes, bed level ADL engagement    Recommend next OT session: POC progression    Recommendation for discharge: (in order for the patient to meet his/her long term goals)  To be determined: SNF with possible to transition to LTC    This discharge recommendation:  Has been made in collaboration with the attending provider and/or case management    IF patient discharges home will need the following DME: TBD- not safe for home       SUBJECTIVE:   Patient stated I hurt so bad.     OBJECTIVE DATA SUMMARY:   Cognitive/Behavioral Status:  Neurologic State: Alert  Orientation Level: Oriented X4  Cognition: Follows commands  Perception: Appears intact  Perseveration: No perseveration noted  Safety/Judgement: Awareness of environment    ADL Intervention:  Grooming  Grooming Assistance: Set-up  Position Performed: Long sitting on bed  Brushing Teeth: Set-up    Cognitive Retraining  Safety/Judgement: Awareness of environment    Pain:  10/10 lower back pain and R shoulder; RN aware and following    Activity Tolerance:   Fair, Poor, and requires frequent rest breaks    After treatment patient left in no apparent distress:   Supine in bed, Call bell within reach, and Side rails x 3    COMMUNICATION/COLLABORATION:   The patients plan of care was discussed with: Registered nurse.      Chato Peterson OT  Time Calculation: 13 mins

## 2022-11-15 NOTE — PROGRESS NOTES
Hospitalist Progress Note    NAME: Ton Zapata   :  1965   MRN:  [id number]       Assessment / Plan:  Leukocytosis WBC 20.9 on 11/10/2022 suspect steroid demargination  Normal WBC 8.0 on 11/3/2022  Started course of decadron 11/3, now stopped  No fevers or SIRS criteria  Suspect demargination  CXR with no new ASD   BC with fevers  Procalcitonin < 0.05  Hold antibiotics  Slowly decreasing     Intractable back pain still real severe  T5 and T6 fracture POA  MRI 10/24/ 2022  Increased subacute T5 partial compression fracture. Unchanged subacute T6 partial compression fracture. Bony retropulsion at T6        causes mild central spinal canal stenosis without cord compression. 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 Neurosurgery Dr. Lance Bunn saw, doesn't think he needs surgical intervention   doesn't have significant weakness of the RLE  Discussed with Radiology, recommended doing CT thoracic spine  CT reviewed, looks like cement displaced. No treatment as such for this  Pain remains uncontrolled    Continue methadone 60 mg daily  Increase oxycodone to 40 mg q4 PRN  Continue lidocaine patch  Continue scheduled tylenol  Increase neurontin to 300 mg TID  PT/OT recommending IPR. [facility] [address] denied, appeal pending                          ([phone], Ref [id number]  S/p decadron taper  Toradol PRN x 2 days  Repeat CT continue to show : mild cord compression, Chronic T5 and T6 compression fracture s/p khypo, cement extrusion is less appreciated.      Carbapenem resistant urinary tract infection POA  Recent Gross catheter and also straight cath  Urine culture grew Carbapenem resistant Klebsiella pneumonia  ID consulted, continued Gentamicin through 10/28/22, course completed     Suspected aspiration pneumonia  Acute hypoxic respiratory failure  MRI shows evidence of aspiration pneumonia. Chest x-ray also shows basilar infiltrates with possible effusion  Started empiric antibiotics with cefepime and Flagyl, started 10/25/22  Changed to Cefdinir flagyl, EOT 10/31/22, 7 day course completed. Aspiration precautions. Procal normal  Now weaned off oxygen  Pulmonary evaluated and signed off  Followup CT in 1 year for 3mm pulm nodule     Possible gout flare - improved  Describes knee pain, similar to previous gout flare  Cw Colchicine now on 0.6 daily  Started allopurinol 100 daily. Chronic pain on methadone  Continue home methadone. Rest of pain meds as above    Debility secondary to CVA and recent left AKA  Continue PT OT     Chronic wounds  Continue wound care     Mild COPD exacerbation  Prn duonebs  S/p prednisone     GERD  Protonix daily     Essential hypertension  Continue PTA Coreg     Hypothyroidism  Continue PTA Synthroid     Left AKA     30.0 - 39.9 Obese / Body mass index is 30.79 kg/m². Code status: Full  Prophylaxis: Lovenox  Recommended Disposition: SNF  BARBARA: Medically ready  Barriers: IPR placement, pending peer to peer     Subjective:     Chief Complaint / Reason for Physician Visit  Follow up for back pain  Continues to complain of back pain  Expecting will be accepted to IPR    Review of Systems:  Symptom Y/N Comments  Symptom Y/N Comments   Fever/Chills n   Chest Pain n    Poor Appetite n   Edema n    Cough n   Abdominal Pain n    Sputum n   Joint Pain y Back pain   SOB/BECK    Pruritis/Rash     Nausea/vomit    Tolerating PT/OT     Diarrhea    Tolerating Diet     Constipation    Other       Could NOT obtain due to:      Objective:     VITALS:   Last 24hrs VS reviewed since prior progress note.  Most recent are:  Patient Vitals for the past 24 hrs:   Temp Pulse Resp BP SpO2   11/15/22 1513 98.2 °F (36.8 °C) 68 19 (!) 93/58 94 %   11/15/22 0922 98.6 °F (37 °C) 80 16 (!) 93/56 95 %   11/15/22 0606 99.4 °F (37.4 °C) 85 16 (!) 108/44 90 %       Intake/Output Summary (Last 24 hours) at 11/15/2022 1546  Last data filed at 11/15/2022 0553  Gross per 24 hour   Intake --   Output 200 ml   Net -200 ml        I had a face to face encounter and independently examined this patient on 11/15/2022, as outlined below:  PHYSICAL EXAM:  General: Awake, No acute distress  EENT:  EOMI. Anicteric sclerae. MMM  Resp:  CTA bilaterally, no wheezing or rales. No accessory muscle use  CV:  Regular  rhythm,  No edema  GI:  Soft, Non distended, Non tender. +Bowel sounds  Neurologic:  Alert and oriented X 3, normal speech,   Psych:   Not anxious nor agitated  Skin:  No rashes. No jaundice    Reviewed most current lab test results and cultures  YES  Reviewed most current radiology test results   YES  Review and summation of old records today    NO  Reviewed patient's current orders and MAR    YES  PMH/SH reviewed - no change compared to H&P  ________________________________________________________________________  Care Plan discussed with:    Comments   Patient y    Family      RN y    Care Manager     Consultant                       y Multidiciplinary team rounds were held today with , nursing, pharmacist and clinical coordinator. Patient's plan of care was discussed; medications were reviewed and discharge planning was addressed.      ________________________________________________________________________  Total NON critical care TIME:  36   Minutes    Total CRITICAL CARE TIME Spent:   Minutes non procedure based      Comments   >50% of visit spent in counseling and coordination of care     ________________________________________________________________________  Jaky Gil MD     Procedures: see electronic medical records for all procedures/Xrays and details which were not copied into this note but were reviewed prior to creation of Plan.      LABS:  I reviewed today's most current labs and imaging studies. Pertinent labs include:  Recent Labs     11/13/22  1131   WBC 17.4*   HGB 10.0*   HCT 32.9*        No results for input(s): NA, K, CL, CO2, GLU, BUN, CREA, CA, MG, PHOS, ALB, TBIL, TBILI, ALT, INR, INREXT in the last 72 hours.     No lab exists for component: SGOT    Signed: Tasia Rivero MD

## 2022-11-16 PROCEDURE — 74011250637 HC RX REV CODE- 250/637: Performed by: INTERNAL MEDICINE

## 2022-11-16 PROCEDURE — 74011250637 HC RX REV CODE- 250/637: Performed by: PHYSICIAN ASSISTANT

## 2022-11-16 PROCEDURE — 74011250637 HC RX REV CODE- 250/637: Performed by: STUDENT IN AN ORGANIZED HEALTH CARE EDUCATION/TRAINING PROGRAM

## 2022-11-16 PROCEDURE — 65270000046 HC RM TELEMETRY

## 2022-11-16 PROCEDURE — 74011000250 HC RX REV CODE- 250: Performed by: STUDENT IN AN ORGANIZED HEALTH CARE EDUCATION/TRAINING PROGRAM

## 2022-11-16 PROCEDURE — 94760 N-INVAS EAR/PLS OXIMETRY 1: CPT

## 2022-11-16 PROCEDURE — 77010033678 HC OXYGEN DAILY

## 2022-11-16 PROCEDURE — 74011250636 HC RX REV CODE- 250/636: Performed by: STUDENT IN AN ORGANIZED HEALTH CARE EDUCATION/TRAINING PROGRAM

## 2022-11-16 RX ORDER — METHADONE HYDROCHLORIDE 10 MG/ML
40 CONCENTRATE ORAL ONCE
Status: COMPLETED | OUTPATIENT
Start: 2022-11-16 | End: 2022-11-16

## 2022-11-16 RX ORDER — METHADONE HYDROCHLORIDE 10 MG/ML
100 CONCENTRATE ORAL DAILY
Status: DISCONTINUED | OUTPATIENT
Start: 2022-11-17 | End: 2022-11-20 | Stop reason: HOSPADM

## 2022-11-16 RX ADMIN — PANTOPRAZOLE SODIUM 40 MG: 40 TABLET, DELAYED RELEASE ORAL at 10:41

## 2022-11-16 RX ADMIN — TAMSULOSIN HYDROCHLORIDE 0.4 MG: 0.4 CAPSULE ORAL at 10:41

## 2022-11-16 RX ADMIN — TIZANIDINE 2 MG: 2 TABLET ORAL at 16:55

## 2022-11-16 RX ADMIN — LEVOTHYROXINE SODIUM 125 MCG: 0.12 TABLET ORAL at 10:41

## 2022-11-16 RX ADMIN — COLCHICINE 0.6 MG: 0.6 TABLET, FILM COATED ORAL at 09:00

## 2022-11-16 RX ADMIN — GABAPENTIN 300 MG: 300 CAPSULE ORAL at 23:02

## 2022-11-16 RX ADMIN — ACETAMINOPHEN 650 MG: 325 TABLET ORAL at 12:00

## 2022-11-16 RX ADMIN — OXYCODONE 40 MG: 5 TABLET ORAL at 18:57

## 2022-11-16 RX ADMIN — ACETAMINOPHEN 650 MG: 325 TABLET ORAL at 01:05

## 2022-11-16 RX ADMIN — ACETAMINOPHEN 650 MG: 325 TABLET ORAL at 23:02

## 2022-11-16 RX ADMIN — OXYCODONE 40 MG: 5 TABLET ORAL at 23:02

## 2022-11-16 RX ADMIN — ACETAMINOPHEN 650 MG: 325 TABLET ORAL at 16:55

## 2022-11-16 RX ADMIN — METHADONE HYDROCHLORIDE 60 MG: 10 CONCENTRATE ORAL at 10:40

## 2022-11-16 RX ADMIN — CARVEDILOL 3.12 MG: 3.12 TABLET, FILM COATED ORAL at 16:55

## 2022-11-16 RX ADMIN — TIZANIDINE 2 MG: 2 TABLET ORAL at 23:02

## 2022-11-16 RX ADMIN — OXYCODONE 40 MG: 5 TABLET ORAL at 06:22

## 2022-11-16 RX ADMIN — OXYCODONE 40 MG: 5 TABLET ORAL at 10:41

## 2022-11-16 RX ADMIN — CALCIUM CARBONATE (ANTACID) CHEW TAB 500 MG 200 MG: 500 CHEW TAB at 11:05

## 2022-11-16 RX ADMIN — MELATONIN 3 MG: at 23:03

## 2022-11-16 RX ADMIN — GABAPENTIN 300 MG: 300 CAPSULE ORAL at 10:41

## 2022-11-16 RX ADMIN — CARVEDILOL 3.12 MG: 3.12 TABLET, FILM COATED ORAL at 10:41

## 2022-11-16 RX ADMIN — CALCIUM CARBONATE (ANTACID) CHEW TAB 500 MG 200 MG: 500 CHEW TAB at 10:41

## 2022-11-16 RX ADMIN — OXYCODONE 40 MG: 5 TABLET ORAL at 01:04

## 2022-11-16 RX ADMIN — METHADONE HYDROCHLORIDE 40 MG: 10 CONCENTRATE ORAL at 18:57

## 2022-11-16 RX ADMIN — GABAPENTIN 300 MG: 300 CAPSULE ORAL at 16:55

## 2022-11-16 RX ADMIN — ACETAMINOPHEN 650 MG: 325 TABLET ORAL at 06:23

## 2022-11-16 RX ADMIN — CASTOR OIL AND BALSAM, PERU: 788; 87 OINTMENT TOPICAL at 09:00

## 2022-11-16 RX ADMIN — CASTOR OIL AND BALSAM, PERU: 788; 87 OINTMENT TOPICAL at 23:07

## 2022-11-16 RX ADMIN — TIZANIDINE 2 MG: 2 TABLET ORAL at 10:41

## 2022-11-16 RX ADMIN — ENOXAPARIN SODIUM 30 MG: 100 INJECTION SUBCUTANEOUS at 10:40

## 2022-11-16 RX ADMIN — CALCIUM CARBONATE (ANTACID) CHEW TAB 500 MG 200 MG: 500 CHEW TAB at 16:55

## 2022-11-16 RX ADMIN — DOXAZOSIN MESYLATE 2 MG: 2 TABLET ORAL at 10:41

## 2022-11-16 RX ADMIN — ALLOPURINOL 100 MG: 100 TABLET ORAL at 10:41

## 2022-11-16 RX ADMIN — OXYCODONE 40 MG: 5 TABLET ORAL at 14:28

## 2022-11-16 NOTE — PROGRESS NOTES
Hospitalist    Did Peer to Peer for [facility] with Dr Clarke Falling     They approved Jeremy Li MD

## 2022-11-16 NOTE — PROGRESS NOTES
Transition of Care Plan:     RUR:13%     BARBARA: 11/17? Disposition:[facility] approved after MD P2P. Pt has Medicaid so SNF is not an option      Plan B:   Home with HH. ? Per chart: CM left VM regarding Hospital bed ordering with Colette Aranda with [employer] and [employer]. CM to also re-send a referral to [facility] SN and PT/OT and need to confirm Dr Carola Ahmadi [phone] can follow patient for home care as patient does not currently have a PCP. CM will await outcome of P2P and follow-up next week. Follow up appointments:after rehab    DME needed:new hospital bed with trapeze and mattress/pad (if does not go to [facility]- left       Transportation at Discharge: [address] [place] [address] or means to access home:   patient/friend     IM Medicare Letter:n/a  Is patient a  and connected with the [place]? N/a  If yes, was [employer] transfer form completed and [facility] notified? Caregiver Contact:Venkata Keenan [phone]  Discharge Caregiver contacted prior to discharge? Per patient  Care Conference needed?:    no    Covid PCR negative on 11/10. Awaiting word on bed avbl at [facility] - Will Call transport to be arranged w/ [facility]. Will need EMTALA docs.     J Luis Frey, MSW

## 2022-11-16 NOTE — PROGRESS NOTES
Physical Therapy  Attempted PT session. Patient with high pain levels after having just participated in rolling and sitting up with nursing. Patient politely declining further mobility. Will continue to follow.

## 2022-11-16 NOTE — PROGRESS NOTES
Patient has no IV access. MD aware. Bedside shift change report given to [facility] (oncoming nurse) by Kaveh Santana LPN (offgoing nurse). Report included the following information SBAR, Kardex, and MAR.

## 2022-11-16 NOTE — PROGRESS NOTES
Hospitalist Progress Note    NAME: Eva Rangel   :  1965   MRN:  [id number]       Assessment / Plan:    Leukocytosis WBC 20.9 on 11/10/2022 suspect steroid demargination  Normal WBC 8.0 on 11/3/2022  Started course of decadron 11/3, stopped 24 hours ago  No fevers or SIRS criteria  Suspect demargination  CXR with no new ASD   BC with fevers  Procalcitonin < 0.05  Hold antibiotics  Slowly decreasing    Intractable back pain still real severe  T5 and T6 fracture POA  MRI 10/24/ 2022  Increased subacute T5 partial compression fracture. Unchanged subacute T6 partial compression fracture. Bony retropulsion at T6        causes mild central spinal canal stenosis without cord compression. 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 Neurosurgery Dr. Janey Hunter saw, doesn't think he needs surgical intervention   doesn't have significant weakness of the RLE  Discussed with Radiology, recommended doing CT thoracic spine  CT reviewed, looks like cement displaced. No treatment as such for this  Pain remains uncontrolled    Continue methadone 60 mg daily  Increase oxycodone to 40 mg q4 PRN  Continue lidocaine patch  Continue scheduled tylenol  Increase neurontin to 300 mg TID  PT/OT recommending IPR. [facility]    [facility] denied, appeal pending    ([phone], Ref [id number]  S/p decadron taper  Toradol PRN x 2 days  Check CT thoracic spine with worsening pain     Carbapenem resistant urinary tract infection POA  Recent Gross catheter and also straight cath  Urine culture grew Carbapenem resistant Klebsiella pneumonia  ID consulted, continued Gentamicin through 10/28/22, course completed     Suspected aspiration pneumonia  Acute hypoxic respiratory failure  MRI shows evidence of aspiration pneumonia. Chest x-ray also shows basilar infiltrates with possible effusion  Started empiric antibiotics with cefepime and Flagyl, started 10/25/22  Changed to Cefdinir flagyl, EOT 10/31/22, 7 day course completed. Aspiration precautions. Procal normal  Now weaned off oxygen  Pulmonary evaluated and signed off  Followup CT in 1 year for 3mm pulm nodule     Possible gout flare - improved  Describes knee pain, similar to previous gout flare  Cw Colchicine now on 0.6 daily  Started allopurinol 100 daily. Chronic pain on methadone  Continue home methadone. Rest of pain meds as above    Debility secondary to CVA and recent left AKA  Continue PT OT     Chronic wounds  Continue wound care     Mild COPD exacerbation  Prn duonebs  S/p prednisone    GERD  Protonix daily     Essential hypertension  Continue PTA Coreg     Hypothyroidism  Continue PTA Synthroid     Left AKA     30.0 - 39.9 Obese / Body mass index is 30.79 kg/m². Code status: Full  Prophylaxis: Lovenox  Recommended Disposition: SNF  BARBARA: Medically ready  Barriers: IPR placement    Subjective:     Chief Complaint / Reason for Physician Visit  Follow up for vertebral fracture  \"The is so severe\" severe mid back pain, not relieved with current pain meds  [facility] denied, appear pending    Review of Systems:  Symptom Y/N Comments  Symptom Y/N Comments   Fever/Chills n   Chest Pain n    Poor Appetite    Edema n    Cough n   Abdominal Pain     Sputum    Joint Pain y Back pain   SOB/BECK n   Pruritis/Rash     Nausea/vomit n   Tolerating PT/OT     Diarrhea n   Tolerating Diet y    Constipation    Other       Could NOT obtain due to:      Objective:     VITALS:   Last 24hrs VS reviewed since prior progress note.  Most recent are:  Patient Vitals for the past 24 hrs:   Temp Pulse Resp BP SpO2   11/16/22 0951 98.2 °F (36.8 °C) 81 -- (!) 70/62 (!) 85 %   11/16/22 0313 97.7 °F (36.5 °C) 72 20 112/69 94 %   11/15/22 2050 98.4 °F (36.9 °C) 72 18 (!) 106/55 93 %   11/15/22 1513 98.2 °F (36.8 °C) 68 19 (!) 93/58 94 %         Intake/Output Summary (Last 24 hours) at 11/16/2022 1143  Last data filed at 11/15/2022 2050  Gross per 24 hour   Intake 300 ml   Output 380 ml   Net -80 ml          I had a face to face encounter and independently examined this patient on 11/16/2022, as outlined below:  PHYSICAL EXAM:  General: Awake, No acute distress  EENT:  Anicteric sclerae. MMM  Resp:  CTA bilaterally, no wheezing or rales. No accessory muscle use  CV:  Regular  rhythm,  No edema  GI:  Soft, Non distended, Non tender. +Bowel sounds  Neurologic:  Alert and oriented X 3, normal speech, Power 4/5 on RLE, left AKA   Psych:   Not anxious nor agitated      Reviewed most current lab test results and cultures  YES  Reviewed most current radiology test results   YES  Review and summation of old records today    NO  Reviewed patient's current orders and MAR    YES  PMH/ reviewed - no change compared to H&P  ________________________________________________________________________  Care Plan discussed with:    Comments   Patient y    Family      RN y    Care Manager     Consultant                       y Multidiciplinary team rounds were held today with , nursing, pharmacist and clinical coordinator. Patient's plan of care was discussed; medications were reviewed and discharge planning was addressed. ________________________________________________________________________        Comments   >50% of visit spent in counseling and coordination of care     ________________________________________________________________________  Roopa Hoyt MD     Procedures: see electronic medical records for all procedures/Xrays and details which were not copied into this note but were reviewed prior to creation of Plan. LABS:  I reviewed today's most current labs and imaging studies.   Pertinent labs include:  No results for input(s): WBC, HGB, HCT, PLT, HGBEXT, HCTEXT, PLTEXT, HGBEXT, HCTEXT, PLTEXT in the last 72 hours. No results for input(s): NA, K, CL, CO2, GLU, BUN, CREA, CA, MG, PHOS, ALB, TBIL, TBILI, ALT, INR, INREXT, INREXT in the last 72 hours.     No lab exists for component: SGOT        Signed: Ned Joyner MD

## 2022-11-16 NOTE — PROGRESS NOTES
Hospitalist Progress Note    NAME: Briana Teresa   :  1965   MRN:  [id number]       Assessment / Plan:  Leukocytosis WBC 20.9 on 11/10/2022 suspect steroid demargination  Normal WBC 8.0 on 11/3/2022  Started course of decadron 11/3, now stopped  No fevers or SIRS criteria  Suspect demargination  CXR with no new ASD   BC with fevers  Procalcitonin < 0.05  Hold antibiotics  Improving     Intractable back pain still real severe  T5 and T6 fracture POA  MRI 10/24/ 2022  Increased subacute T5 partial compression fracture. Unchanged subacute T6 partial compression fracture. Bony retropulsion at T6        causes mild central spinal canal stenosis without cord compression. 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 Neurosurgery Dr. Aashish Noel saw, doesn't think he needs surgical intervention   doesn't have significant weakness of the RLE  Discussed with Radiology, recommended doing CT thoracic spine  CT reviewed, looks like cement displaced. No treatment as such for this  Pain remains uncontrolled    Increase methadone 100 mg daily  Increase oxycodone to 40 mg q4 PRN  Continue lidocaine patch  Continue scheduled tylenol  Increase neurontin to 300 mg TID  PT/OT recommending IPR. He wants to go to [facility]. appeal accepted  S/p decadron taper  Toradol PRN x 2 days  Repeat CT continue to show : mild cord compression, Chronic T5 and T6 compression fracture s/p khypo, cement extrusion is less appreciated.      Carbapenem resistant urinary tract infection POA  Recent Gross catheter and also straight cath  Urine culture grew Carbapenem resistant Klebsiella pneumonia  ID consulted, continued Gentamicin through 10/28/22, course completed     Suspected aspiration pneumonia  Acute hypoxic respiratory failure  MRI shows evidence of aspiration pneumonia. Chest x-ray also shows basilar infiltrates with possible effusion  Started empiric antibiotics with cefepime and Flagyl, started 10/25/22  Changed to Cefdinir flagyl, EOT 10/31/22, 7 day course completed. Aspiration precautions. Procal normal  Now weaned off oxygen  Pulmonary evaluated and signed off  Followup CT in 1 year for 3mm pulm nodule     Possible gout flare - improved  Describes knee pain, similar to previous gout flare  Cw Colchicine now on 0.6 daily  Started allopurinol 100 daily. Chronic pain on methadone  Continue home methadone, increased to 100 mg. Rest of pain meds as above    Debility secondary to CVA and recent left AKA  Continue PT OT     Chronic wounds  Continue wound care     Mild COPD exacerbation  Prn duonebs  S/p prednisone     GERD  Protonix daily     Essential hypertension  Continue PTA Coreg     Hypothyroidism  Continue PTA Synthroid     Left AKA     30.0 - 39.9 Obese / Body mass index is 30.79 kg/m². Code status: Full  Prophylaxis: Lovenox  Recommended Disposition: SNF  BARBARA: Medically ready  Barriers: IPR placement, plan for DC on 1/17     Subjective:     Chief Complaint / Reason for Physician Visit  Follow up for back pain  Patient continues to have a lot of back pain. [facility] was accepted to [facility] for which he is happy. Review of Systems:  Symptom Y/N Comments  Symptom Y/N Comments   Fever/Chills n   Chest Pain n    Poor Appetite n   Edema n    Cough n   Abdominal Pain n    Sputum n   Joint Pain y Back pain   SOB/BECK    Pruritis/Rash     Nausea/vomit    Tolerating PT/OT     Diarrhea    Tolerating Diet     Constipation    Other       Could NOT obtain due to:      Objective:     VITALS:   Last 24hrs VS reviewed since prior progress note.  Most recent are:  Patient Vitals for the past 24 hrs:   Temp Pulse Resp BP SpO2   11/16/22 1528 99.1 °F (37.3 °C) 63 18 90/71 (!) 89 % 11/16/22 0951 98.2 °F (36.8 °C) 81 20 (!) 70/62 95 %   11/16/22 0313 97.7 °F (36.5 °C) 72 20 112/69 94 %   11/15/22 2050 98.4 °F (36.9 °C) 72 18 (!) 106/55 93 %         Intake/Output Summary (Last 24 hours) at 11/16/2022 1714  Last data filed at 11/16/2022 1039  Gross per 24 hour   Intake 550 ml   Output 380 ml   Net 170 ml          I had a face to face encounter and independently examined this patient on 11/16/2022, as outlined below:  PHYSICAL EXAM:  General: Awake, No acute distress  EENT:  EOMI. Anicteric sclerae. MMM  Resp:  CTA bilaterally, no wheezing or rales. No accessory muscle use  CV:  Regular  rhythm,  No edema  GI:  Soft, Non distended, Non tender. +Bowel sounds, obese  Neurologic:  Alert and oriented X 3, normal speech, Left sided paresis  Psych:   Not anxious nor agitated  Skin:  No rashes. No jaundice    Reviewed most current lab test results and cultures  YES  Reviewed most current radiology test results   YES  Review and summation of old records today    NO  Reviewed patient's current orders and MAR    YES  PMH/ reviewed - no change compared to H&P  ________________________________________________________________________  Care Plan discussed with:    Comments   Patient y    Family      RN y    Care Manager     Consultant                       y Multidiciplinary team rounds were held today with , nursing, pharmacist and clinical coordinator. Patient's plan of care was discussed; medications were reviewed and discharge planning was addressed.      ________________________________________________________________________  Total NON critical care TIME:  36   Minutes    Total CRITICAL CARE TIME Spent:   Minutes non procedure based      Comments   >50% of visit spent in counseling and coordination of care     ________________________________________________________________________  Ganesh Crump MD     Procedures: see electronic medical records for all procedures/Xrays and details which were not copied into this note but were reviewed prior to creation of Plan. LABS:  I reviewed today's most current labs and imaging studies. Pertinent labs include:  No results for input(s): WBC, HGB, HCT, PLT, HGBEXT, HCTEXT, PLTEXT, HGBEXT, HCTEXT, PLTEXT in the last 72 hours. No results for input(s): NA, K, CL, CO2, GLU, BUN, CREA, CA, MG, PHOS, ALB, TBIL, TBILI, ALT, INR, INREXT, INREXT in the last 72 hours.     No lab exists for component: SGOT    Signed: Antonina Mei MD

## 2022-11-17 PROCEDURE — 74011250636 HC RX REV CODE- 250/636: Performed by: STUDENT IN AN ORGANIZED HEALTH CARE EDUCATION/TRAINING PROGRAM

## 2022-11-17 PROCEDURE — 65270000046 HC RM TELEMETRY

## 2022-11-17 PROCEDURE — 74011250637 HC RX REV CODE- 250/637: Performed by: STUDENT IN AN ORGANIZED HEALTH CARE EDUCATION/TRAINING PROGRAM

## 2022-11-17 PROCEDURE — 74011250637 HC RX REV CODE- 250/637: Performed by: INTERNAL MEDICINE

## 2022-11-17 PROCEDURE — 74011000250 HC RX REV CODE- 250: Performed by: STUDENT IN AN ORGANIZED HEALTH CARE EDUCATION/TRAINING PROGRAM

## 2022-11-17 PROCEDURE — 74011250637 HC RX REV CODE- 250/637: Performed by: PHYSICIAN ASSISTANT

## 2022-11-17 PROCEDURE — 97530 THERAPEUTIC ACTIVITIES: CPT

## 2022-11-17 PROCEDURE — 94760 N-INVAS EAR/PLS OXIMETRY 1: CPT

## 2022-11-17 RX ADMIN — TAMSULOSIN HYDROCHLORIDE 0.4 MG: 0.4 CAPSULE ORAL at 09:47

## 2022-11-17 RX ADMIN — CARVEDILOL 3.12 MG: 3.12 TABLET, FILM COATED ORAL at 17:02

## 2022-11-17 RX ADMIN — DOXAZOSIN MESYLATE 2 MG: 2 TABLET ORAL at 09:47

## 2022-11-17 RX ADMIN — LEVOTHYROXINE SODIUM 125 MCG: 0.12 TABLET ORAL at 09:47

## 2022-11-17 RX ADMIN — COLCHICINE 0.6 MG: 0.6 TABLET, FILM COATED ORAL at 10:31

## 2022-11-17 RX ADMIN — ENOXAPARIN SODIUM 30 MG: 100 INJECTION SUBCUTANEOUS at 09:47

## 2022-11-17 RX ADMIN — CALCIUM CARBONATE (ANTACID) CHEW TAB 500 MG 200 MG: 500 CHEW TAB at 17:02

## 2022-11-17 RX ADMIN — PANTOPRAZOLE SODIUM 40 MG: 40 TABLET, DELAYED RELEASE ORAL at 09:47

## 2022-11-17 RX ADMIN — GABAPENTIN 300 MG: 300 CAPSULE ORAL at 23:01

## 2022-11-17 RX ADMIN — CASTOR OIL AND BALSAM, PERU: 788; 87 OINTMENT TOPICAL at 09:00

## 2022-11-17 RX ADMIN — ACETAMINOPHEN 650 MG: 325 TABLET ORAL at 12:36

## 2022-11-17 RX ADMIN — ALLOPURINOL 100 MG: 100 TABLET ORAL at 09:47

## 2022-11-17 RX ADMIN — METHADONE HYDROCHLORIDE 100 MG: 10 CONCENTRATE ORAL at 09:47

## 2022-11-17 RX ADMIN — ACETAMINOPHEN 650 MG: 325 TABLET ORAL at 23:27

## 2022-11-17 RX ADMIN — TIZANIDINE 2 MG: 2 TABLET ORAL at 17:02

## 2022-11-17 RX ADMIN — TIZANIDINE 2 MG: 2 TABLET ORAL at 09:47

## 2022-11-17 RX ADMIN — GABAPENTIN 300 MG: 300 CAPSULE ORAL at 09:47

## 2022-11-17 RX ADMIN — OXYCODONE 40 MG: 5 TABLET ORAL at 23:00

## 2022-11-17 RX ADMIN — OXYCODONE 40 MG: 5 TABLET ORAL at 17:02

## 2022-11-17 RX ADMIN — CALCIUM CARBONATE (ANTACID) CHEW TAB 500 MG 200 MG: 500 CHEW TAB at 09:47

## 2022-11-17 RX ADMIN — CARVEDILOL 3.12 MG: 3.12 TABLET, FILM COATED ORAL at 09:47

## 2022-11-17 RX ADMIN — OXYCODONE 40 MG: 5 TABLET ORAL at 08:07

## 2022-11-17 RX ADMIN — CALCIUM CARBONATE (ANTACID) CHEW TAB 500 MG 200 MG: 500 CHEW TAB at 12:36

## 2022-11-17 RX ADMIN — ACETAMINOPHEN 650 MG: 325 TABLET ORAL at 06:21

## 2022-11-17 RX ADMIN — OXYCODONE 40 MG: 5 TABLET ORAL at 03:51

## 2022-11-17 RX ADMIN — TIZANIDINE 2 MG: 2 TABLET ORAL at 23:00

## 2022-11-17 RX ADMIN — OXYCODONE 40 MG: 5 TABLET ORAL at 12:36

## 2022-11-17 RX ADMIN — MELATONIN 3 MG: at 23:00

## 2022-11-17 RX ADMIN — GABAPENTIN 300 MG: 300 CAPSULE ORAL at 17:02

## 2022-11-17 RX ADMIN — ACETAMINOPHEN 650 MG: 325 TABLET ORAL at 17:02

## 2022-11-17 NOTE — PROGRESS NOTES
Went back to evaluate the patient due to continuing pain. I was able discuss care with interventional radiology who reviewed his images and stated that there was nothing else for them to intervene on at this time. I also discussed the case with neurosurgery who also stated that there was no indication for surgery at this time. I did discuss the case with patient's friend and the patient himself and stated that there were no more interventions that we could do during this hospitalization. They were inquiring about following up with a different physician as an outpatient for another opinion for which I agreed. As of now, the plan is to get him to [facility] rehab for physical therapy and he can follow-up as an outpatient with the physician of his choice.

## 2022-11-17 NOTE — PROGRESS NOTES
Comprehensive Nutrition Assessment    Type and Reason for Visit: Reassess    Nutrition Recommendations/Plan:   Continue regular diet      Malnutrition Assessment:  Malnutrition Status:  No malnutrition (11/03/22 1507)      Nutrition Assessment:    Chart reviewed; patient continues on a regular diet. Awaiting bed availability at rehab to be discharged. Good appetite/PO intake per flowsheets. Visited patient at bedside. He reports eating well, getting plenty to eat, and did not have any nutrition questions or concerns at this time. Nutrition Related Findings:    Tums, Coreg, Synthroid, Protonix   BM 11/11   No new labs   Wound Type: MASD, Surgical incision    Current Nutrition Intake & Therapies:  Average Meal Intake: %  Average Supplement Intake: None ordered  DIET ONE TIME MESSAGE  ADULT DIET Regular    Anthropometric Measures:  Height: 6' (182.9 cm)  Ideal Body Weight (IBW): 178 lbs (81 kg)     Current Body Wt:  98.3 kg (216 lb 11.4 oz), 127.6 % IBW. Not specified  Current BMI (kg/m2): 29.4        Weight Adjustment: No adjustment                 BMI Category: Overweight (BMI 25.0-29. 9)    Estimated Daily Nutrient Needs:  Energy Requirements Based On: Formula  Weight Used for Energy Requirements: Current  Energy (kcal/day): 2216 kcals (BMR x 1. 2AF)  Weight Used for Protein Requirements: Current  Protein (g/day): 98-117g (1.0-1.2 g/kg bw)  Method Used for Fluid Requirements: 1 ml/kcal  Fluid (ml/day): 2000 mL    Nutrition Diagnosis:   No nutrition diagnosis at this time     Nutrition Interventions:   Food and/or Nutrient Delivery: Continue current diet  Nutrition Education/Counseling: No recommendations at this time  Coordination of Nutrition Care: Continue to monitor while inpatient       Goals:  Previous Goal Met: Goal(s) achieved  Goals: PO intake 75% or greater, by next RD assessment       Nutrition Monitoring and Evaluation:   Behavioral-Environmental Outcomes: None identified  Food/Nutrient Intake Outcomes: Food and nutrient intake  Physical Signs/Symptoms Outcomes: Biochemical data, Weight    Discharge Planning:    Continue current diet    Izaiah Richardson RD  Contact: ext [phone]

## 2022-11-17 NOTE — PROGRESS NOTES
Pt  alert and oriented x4, has been having sleepless night due to back pain, he is unable to get comfortable with any position, He is being medicated with 40 mg of Oxycodone Q 4hly around the clock , without comfort  He screams       and yell for pain med every minute. Will monitor.

## 2022-11-17 NOTE — PROGRESS NOTES
Problem: Mobility Impaired (Adult and Pediatric)  Goal: *Acute Goals and Plan of Care (Insert Text)  Description: FUNCTIONAL STATUS PRIOR TO ADMISSION: Pt lives at home, had been alone but recently since amputation his friend has been staying with him. Pt pt's request, talked with friend on phone to get status PTA. She stated pt was functioning at w/c level with an electric scooter; needed SBA for either pivot transfer or sliding board transfer (does have grace but she states he did better without); needed help with dressing and uses wipes for bathing because he could not get onto shower chair any longer. She states he was going to OPPT/OT at [facility]. She states they were apparently working on getting him a care aide. Pt also states that he has a  for his L AKA (5 weeks ago) but it no longer fits. He was unable to fill in where he got it. HOME SUPPORT PRIOR TO ADMISSION: The patient lived alone with friend to provide assistance. Physical Therapy Goals  Initiated 10/17/2022  1. Patient will move from supine to sit and sit to supine , scoot up and down, and roll side to side in bed with modified independence within 7 day(s). 2.  Patient will transfer from bed to chair and chair to bed with minimal assistance/contact guard assist using the least restrictive device within 7 day(s). 3.  Patient will show good sitting balance static and dynamic for safe bed mobility and transfers within 7 days. Physical Therapy Goal  Reassessed 11/8/2022, 11/17/2022 goals appropriate for next 7 days  Re-Assessed 10/28/2022  1. Patient will move from supine to sit and sit to supine , scoot up and down, and roll side to side in bed with minimal assistance within 7 day(s). 2.  Patient will transfer from bed to chair and chair to bed with moderate assist using the least restrictive device within 7 day(s).   3.  Patient will show good sitting balance static and dynamic for safe bed mobility and transfers within 7 days.  4.  Patient will demonstrate 5 exercises without verbal cueing to improve UE/LE ROM and strength within 7 days. Outcome: Not Progressing Towards Goal   PHYSICAL THERAPY TREATMENT: WEEKLY REASSESSMENT  Patient: Natalya Pereira (76 y.o. male)  Date: 11/17/2022  Primary Diagnosis: COPD with acute exacerbation (HCC) [J44.1]       Precautions:   Fall, Contact (ESBL wound)      ASSESSMENT  Patient continues with skilled PT services and is not progressing towards goals, remaining limited by increased pain levels, impaired activity tolerance, generalized weakness, hx of CVA w/ residual L sided weakness, new L AKA, and impaired balance. Pt c/o 10/10 back pain only agreeable to brief EOB activity before declining further participation with therapy. Pt assumed seated position EOB w/ CGAx1 and tolerated seated position EOB x5 minutes performing RLE strengthening exercise. Pt returned to supine position in bed at end of therapy session and RN notified of pain levels. Patient's progression toward goals since last assessment: Pt is making slow progress, limited largely by pain levels. Current Level of Function Impacting Discharge (mobility/balance): CGA for bed mobility    Functional Outcome Measure: The patient scored 20/100 on the Barthel Index outcome measure which is indicative of significant impairments in ADLs and functional mobility. Other factors to consider for discharge: limited by 10/10 pain, L AKA, hx of CVA         PLAN :  Goals have been updated based on progression since last assessment. Patient continues to benefit from skilled intervention to address the above impairments. Recommendations and Planned Interventions: bed mobility training, transfer training, gait training, therapeutic exercises, patient and family training/education, and therapeutic activities      Frequency/Duration: Patient will be followed by physical therapy:  3 times a week to address goals.     Recommendation for discharge: (in order for the patient to meet his/her long term goals)  Rehab    This discharge recommendation:  Has been made in collaboration with the attending provider and/or case management    IF patient discharges home will need the following DME: to be determined (TBD)         SUBJECTIVE:   Patient stated I need pain medicine.     OBJECTIVE DATA SUMMARY:   HISTORY:    Past Medical History:   Diagnosis Date    Aneurysm ([address]     (with stroke) brain    Bladder tumor     Cancer ([address]     bladder CA    Chronic pain     Family history of bladder cancer     GERD (gastroesophageal reflux disease)     Gout     History of vascular access device 04/25/2019    [facility] VAT 4 FR MIDLINE R Cephalic Limited access    History of vascular access device 04/26/2019    4 fr Midline right Cephalic midline access    Hypercholesterolemia     Hypertension     Lesion of bladder     Seizures ([address]     Stroke [address] 2006    left sided weakness    Thromboembolus ([facility]     left leg    Thyroid disease     TIA (transient ischemic attack) 2012     Past Surgical History:   Procedure Laterality Date    HX ORTHOPAEDIC      R arthroscopy    HX OTHER SURGICAL      x2 L foot    HX UROLOGICAL  04/20/2018    Cystoscopy, TURBT (greater than 5 cm resected) Dr. Adeel Moore, [facility].     IR KYPHOPLASTY THORACIC  10/27/2022    KNEE ARTHROSCP HARV      left knee    TRANSURETHRAL RESEC BLADDER NECK  08/10/2018       Personal factors and/or comorbidities impacting plan of care:     Home Situation  Home Environment: Apartment  # Steps to Enter: 0  One/Two Story Residence: One story  Living Alone: Yes (friend with him right now assisting with transfers and dressing)  Support Systems: Friend/Neighbor  Patient Expects to be Discharged to[de-identified] Rehab hospital/unit acute  Current DME Used/Available at Home: Wheelchair, power, Hospital bed, Shower chair, Wheelchair, Commode, bedside (sliding board, electric scooter, grace)  Tub or Shower Type: Tub/Shower combination    EXAMINATION/PRESENTATION/DECISION MAKING:   Critical Behavior:  Neurologic State: Alert  Orientation Level: Oriented X4  Cognition: Follows commands  Safety/Judgement: Awareness of environment  Hearing: Auditory  Auditory Impairment: None  Skin:  intact  Edema: none noted  Range Of Motion:                          Strength: Tone & Sensation:                                  Coordination:     Vision:      Functional Mobility:  Bed Mobility:     Supine to Sit: Contact guard assistance  Sit to Supine: Stand-by assistance     Transfers:                             Balance:   Sitting: Impaired  Sitting - Static: Good (unsupported)  Sitting - Dynamic: Fair (occasional)  Ambulation/Gait Training:                     Ambulation did not occur. Therapeutic Exercises:   R LE LAQ, R LE ankle pumps    Functional Measure:  Barthel Index:    Bathin  Bladder: 5  Bowels: 5  Groomin  Dressin  Feedin  Mobility: 0  Stairs: 0  Toilet Use: 0  Transfer (Bed to Chair and Back): 5  Total: 20/100       The Barthel ADL Index: Guidelines  1. The index should be used as a record of what a patient does, not as a record of what a patient could do. 2. The main aim is to establish degree of independence from any help, physical or verbal, however minor and for whatever reason. 3. The need for supervision renders the patient not independent. 4. A patient's performance should be established using the best available evidence. Asking the patient, friends/relatives and nurses are the usual sources, but direct observation and common sense are also important. However direct testing is not needed. 5. Usually the patient's performance over the preceding 24-48 hours is important, but occasionally longer periods will be relevant. 6. Middle categories imply that the patient supplies over 50 per cent of the effort. 7. Use of aids to be independent is allowed.     Score Interpretation (from Sinoff 1997)    Independent   60-79 Minimally independent   40-59 Partially dependent   20-39 Very dependent   <20 Totally dependent     -Danyelle Good., Barthel, D.W. (1965). Functional evaluation: the Barthel Index. [address] ([address] (1997). The Barthel activities of daily living index: self-reporting versus actual performance in the old (> or = 75 years). Journal [address] 45(7), [facility], J.J.M.F, [facility]., Jimbo Allen. (1999). Measuring the change in disability after inpatient rehabilitation; comparison of the responsiveness of the Barthel Index and Functional Park Measure. Journal of Neurology, Neurosurgery, and Psychiatry, 66(4), 186-185. DORCAS Gregory, ELEANOR Hines, & Gordon Reardon M.A. (2004) Assessment of post-stroke quality of life in cost-effectiveness studies: The usefulness of the Barthel Index and the EuroQoL-5D. Quality of Life Research, 13, 427-43          Pain Rating:  Reported 10/10 pain in low back    Activity Tolerance:   VSS however pt limited by increased back pain    After treatment patient left in no apparent distress:   Supine in bed, Call bell within reach, and Side rails x 3    COMMUNICATION/EDUCATION:   The patients plan of care was discussed with: Registered nurse. Fall prevention education was provided and the patient/caregiver indicated understanding., Patient/family have participated as able in goal setting and plan of care. , and Patient/family agree to work toward stated goals and plan of care.     Thank you for this referral.  Marcia Hernandez, PT, DPT   Time Calculation: 14 mins

## 2022-11-17 NOTE — PROGRESS NOTES
Occupational Therapy   Chart reviewed; patient with increased back pain; MD, RN aware; will defer OT at this time.  Harley Pop OTR/L

## 2022-11-17 NOTE — PROGRESS NOTES
Hospitalist Progress Note    NAME: Malina Hamilton   :  1965   MRN:  [id number]       Assessment / Plan:  Leukocytosis WBC 20.9 on 11/10/2022 suspect steroid demargination  Normal WBC 8.0 on 11/3/2022  Started course of decadron 11/3, now stopped  No fevers or SIRS criteria  Suspect demargination  CXR with no new ASD   BC with fevers  Procalcitonin < 0.05  Hold antibiotics  Improving     Intractable back pain still real severe  T5 and T6 fracture POA  MRI 10/24/ 2022  Increased subacute T5 partial compression fracture. Unchanged subacute T6 partial compression fracture. Bony retropulsion at T6        causes mild central spinal canal stenosis without cord compression. 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 Neurosurgery Dr. Willard Kelley saw, doesn't think he needs surgical intervention   doesn't have significant weakness of the RLE  Discussed with Radiology, recommended doing CT thoracic spine  CT reviewed, looks like cement displaced. No treatment as such for this  Pain remains uncontrolled    Increase methadone 100 mg daily  Increase oxycodone to 40 mg q4 PRN  Continue lidocaine patch  Continue scheduled tylenol  Increase neurontin to 300 mg TID  PT/OT recommending IPR. [facility] wants to [facility]. appeal accepted  S/p decadron taper  Toradol PRN x 2 days  Repeat CT continue to show : mild cord compression, Chronic T5 and T6 compression fracture s/p khypo, cement extrusion is less appreciated.      Carbapenem resistant urinary tract infection POA  Recent Gross catheter and also straight cath  Urine culture grew Carbapenem resistant Klebsiella pneumonia  ID consulted, continued Gentamicin through 10/28/22, course completed     Suspected aspiration pneumonia  Acute hypoxic respiratory failure  MRI shows evidence of aspiration pneumonia. Chest x-ray also shows basilar infiltrates with possible effusion  Started empiric antibiotics with cefepime and Flagyl, started 10/25/22  Changed to Cefdinir flagyl, EOT 10/31/22, 7 day course completed. Aspiration precautions. Procal normal  Now weaned off oxygen  Pulmonary evaluated and signed off  Followup CT in 1 year for 3mm pulm nodule     Possible gout flare - improved  Describes knee pain, similar to previous gout flare  Cw Colchicine now on 0.6 daily  C/w allopurinol 100 daily. Chronic pain on methadone  Continue home methadone, increased to 100 mg. Rest of pain meds as above    Debility secondary to CVA and recent left AKA  Continue PT OT     Chronic wounds  Continue wound care     Mild COPD exacerbation  Prn duonebs  S/p prednisone     GERD  Protonix daily     Essential hypertension  Continue PTA Coreg     Hypothyroidism  Continue PTA Synthroid     Left AKA     30.0 - 39.9 Obese / Body mass index is 30.79 kg/m². Code status: Full  Prophylaxis: Lovenox  Recommended Disposition: SNF  BARBARA: Medically ready  Barriers: IPR placement, plan for DC on 1/17     Subjective:     Chief Complaint / Reason for Physician Visit  Follow up for back pain  Patient seen and examined the bedside. Continues to have back pain. I did increase methadone 100 mg this morning. Review of Systems:  Symptom Y/N Comments  Symptom Y/N Comments   Fever/Chills n   Chest Pain n    Poor Appetite n   Edema n    Cough n   Abdominal Pain n    Sputum n   Joint Pain y Back pain   SOB/BECK    Pruritis/Rash     Nausea/vomit    Tolerating PT/OT     Diarrhea    Tolerating Diet     Constipation    Other       Could NOT obtain due to:      Objective:     VITALS:   Last 24hrs VS reviewed since prior progress note.  Most recent are:  Patient Vitals for the past 24 hrs:   Temp Pulse Resp BP SpO2   11/17/22 0812 99.7 °F (37.6 °C) 77 -- (!) 150/117 (!) 84 %   11/16/22 1528 99.1 °F (37.3 °C) 63 18 90/71 (!) 89 %         Intake/Output Summary (Last 24 hours) at 11/17/2022 1155  Last data filed at 11/17/2022 0946  Gross per 24 hour   Intake 500 ml   Output 250 ml   Net 250 ml          I had a face to face encounter and independently examined this patient on 11/17/2022, as outlined below:  PHYSICAL EXAM:  General: Awake, No acute distress  EENT:  EOMI. Anicteric sclerae. MMM  Resp:  CTA bilaterally, no wheezing or rales. No accessory muscle use  CV:  Regular  rhythm,  No edema  GI:  Soft, Non distended, Non tender. +Bowel sounds, obese  Neurologic:  Alert and oriented X 3, normal speech, Left sided paresis  Psych:   Not anxious nor agitated  Skin:  No rashes. No jaundice    Reviewed most current lab test results and cultures  YES  Reviewed most current radiology test results   YES  Review and summation of old records today    NO  Reviewed patient's current orders and MAR    YES  PMH/ reviewed - no change compared to H&P  ________________________________________________________________________  Care Plan discussed with:    Comments   Patient y    Family      RN y    Care Manager     Consultant                       y Multidiciplinary team rounds were held today with , nursing, pharmacist and clinical coordinator. Patient's plan of care was discussed; medications were reviewed and discharge planning was addressed. ________________________________________________________________________  Total NON critical care TIME:  36   Minutes    Total CRITICAL CARE TIME Spent:   Minutes non procedure based      Comments   >50% of visit spent in counseling and coordination of care     ________________________________________________________________________  Joanie Nguyen MD     Procedures: see electronic medical records for all procedures/Xrays and details which were not copied into this note but were reviewed prior to creation of Plan.       LABS:  I reviewed today's most current labs and imaging studies. Pertinent labs include:  No results for input(s): WBC, HGB, HCT, PLT, HGBEXT, HCTEXT, PLTEXT, HGBEXT, HCTEXT, PLTEXT in the last 72 hours. No results for input(s): NA, K, CL, CO2, GLU, BUN, CREA, CA, MG, PHOS, ALB, TBIL, TBILI, ALT, INR, INREXT, INREXT in the last 72 hours.     No lab exists for component: SGOT    Signed: Fidel Walker MD

## 2022-11-18 LAB
PHOSPHATE SERPL-MCNC: 5.4 MG/DL (ref 2.6–4.7)
SARS-COV-2, COV2: NORMAL

## 2022-11-18 PROCEDURE — 74011250637 HC RX REV CODE- 250/637: Performed by: PHYSICIAN ASSISTANT

## 2022-11-18 PROCEDURE — 94760 N-INVAS EAR/PLS OXIMETRY 1: CPT

## 2022-11-18 PROCEDURE — 74011250637 HC RX REV CODE- 250/637: Performed by: STUDENT IN AN ORGANIZED HEALTH CARE EDUCATION/TRAINING PROGRAM

## 2022-11-18 PROCEDURE — 74011250636 HC RX REV CODE- 250/636: Performed by: STUDENT IN AN ORGANIZED HEALTH CARE EDUCATION/TRAINING PROGRAM

## 2022-11-18 PROCEDURE — 65270000046 HC RM TELEMETRY

## 2022-11-18 PROCEDURE — 84100 ASSAY OF PHOSPHORUS: CPT

## 2022-11-18 PROCEDURE — 74011250637 HC RX REV CODE- 250/637: Performed by: INTERNAL MEDICINE

## 2022-11-18 PROCEDURE — 77010033678 HC OXYGEN DAILY

## 2022-11-18 PROCEDURE — U0005 INFEC AGEN DETEC AMPLI PROBE: HCPCS

## 2022-11-18 PROCEDURE — 36415 COLL VENOUS BLD VENIPUNCTURE: CPT

## 2022-11-18 PROCEDURE — 74011000250 HC RX REV CODE- 250: Performed by: STUDENT IN AN ORGANIZED HEALTH CARE EDUCATION/TRAINING PROGRAM

## 2022-11-18 RX ADMIN — DOXAZOSIN MESYLATE 2 MG: 2 TABLET ORAL at 09:20

## 2022-11-18 RX ADMIN — GABAPENTIN 300 MG: 300 CAPSULE ORAL at 09:19

## 2022-11-18 RX ADMIN — TIZANIDINE 2 MG: 2 TABLET ORAL at 09:20

## 2022-11-18 RX ADMIN — OXYCODONE 40 MG: 5 TABLET ORAL at 21:46

## 2022-11-18 RX ADMIN — OXYCODONE 40 MG: 5 TABLET ORAL at 09:20

## 2022-11-18 RX ADMIN — MELATONIN 3 MG: at 21:46

## 2022-11-18 RX ADMIN — TIZANIDINE 2 MG: 2 TABLET ORAL at 21:47

## 2022-11-18 RX ADMIN — COLCHICINE 0.6 MG: 0.6 TABLET, FILM COATED ORAL at 09:58

## 2022-11-18 RX ADMIN — ALLOPURINOL 100 MG: 100 TABLET ORAL at 09:20

## 2022-11-18 RX ADMIN — ACETAMINOPHEN 650 MG: 325 TABLET ORAL at 06:12

## 2022-11-18 RX ADMIN — ACETAMINOPHEN 650 MG: 325 TABLET ORAL at 18:34

## 2022-11-18 RX ADMIN — CALCIUM CARBONATE (ANTACID) CHEW TAB 500 MG 200 MG: 500 CHEW TAB at 09:19

## 2022-11-18 RX ADMIN — METHADONE HYDROCHLORIDE 100 MG: 10 CONCENTRATE ORAL at 09:19

## 2022-11-18 RX ADMIN — TAMSULOSIN HYDROCHLORIDE 0.4 MG: 0.4 CAPSULE ORAL at 09:20

## 2022-11-18 RX ADMIN — GABAPENTIN 300 MG: 300 CAPSULE ORAL at 16:51

## 2022-11-18 RX ADMIN — GABAPENTIN 300 MG: 300 CAPSULE ORAL at 21:47

## 2022-11-18 RX ADMIN — CASTOR OIL AND BALSAM, PERU: 788; 87 OINTMENT TOPICAL at 21:49

## 2022-11-18 RX ADMIN — CARVEDILOL 3.12 MG: 3.12 TABLET, FILM COATED ORAL at 09:20

## 2022-11-18 RX ADMIN — CALCIUM CARBONATE (ANTACID) CHEW TAB 500 MG 200 MG: 500 CHEW TAB at 11:53

## 2022-11-18 RX ADMIN — OXYCODONE 40 MG: 5 TABLET ORAL at 03:24

## 2022-11-18 RX ADMIN — OXYCODONE 40 MG: 5 TABLET ORAL at 16:51

## 2022-11-18 RX ADMIN — PANTOPRAZOLE SODIUM 40 MG: 40 TABLET, DELAYED RELEASE ORAL at 06:12

## 2022-11-18 RX ADMIN — ACETAMINOPHEN 650 MG: 325 TABLET ORAL at 11:53

## 2022-11-18 RX ADMIN — TIZANIDINE 2 MG: 2 TABLET ORAL at 16:51

## 2022-11-18 RX ADMIN — LEVOTHYROXINE SODIUM 125 MCG: 0.12 TABLET ORAL at 06:12

## 2022-11-18 RX ADMIN — CARVEDILOL 3.12 MG: 3.12 TABLET, FILM COATED ORAL at 16:51

## 2022-11-18 RX ADMIN — CASTOR OIL AND BALSAM, PERU: 788; 87 OINTMENT TOPICAL at 09:21

## 2022-11-18 NOTE — PROGRESS NOTES
Pt c/o back pain and discomfort throughout the shift. Prn medication given as prescribed and was slightly effective for about one hour in a half. Pt stated \"I am going to seek legal help, because I can not deal with this pain and the doctors here do not want to help me. \" Night   Charge made aware. Writer attempted to teach pt relaxation techniques which was not effective, pt continued to moan while awake and refuse to allow the staff to turn and reposition him nor take vss.

## 2022-11-18 NOTE — PROGRESS NOTES
Hospitalist Progress Note    NAME: Noemí Murphy   :  1965   MRN:  [id number]       Assessment / Plan:  Leukocytosis WBC 20.9 on 11/10/2022 suspect steroid demargination  Normal WBC 8.0 on 11/3/2022  Started course of decadron 11/3, now stopped  No fevers or SIRS criteria  Suspect demargination  CXR with no new ASD   Procalcitonin < 0.05  Improving     Intractable back pain still real severe  T5 and T6 fracture POA  MRI 10/24/ 2022  Increased subacute T5 partial compression fracture. Unchanged subacute T6 partial compression fracture. Bony retropulsion at T6        causes mild central spinal canal stenosis without cord compression. 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 Neurosurgery Dr. Ariana Box saw, doesn't think he needs surgical intervention   doesn't have significant weakness of the RLE  Discussed with Radiology, recommended doing CT thoracic spine  CT reviewed, looks like cement displaced. No treatment as such for this  Pain remains uncontrolled    Increase methadone 100 mg daily  Increase oxycodone to 40 mg q4 PRN  Continue lidocaine patch  Continue scheduled tylenol  Increase neurontin to 300 mg TID  PT/OT recommending IPR. [facility]. [facility] accepted. Plan to transition to [facility] tomorrow  S/p decadron taper  Toradol PRN x 2 days  Repeat CT continue to show : mild cord compression, Chronic T5 and T6 compression fracture s/p khypo, cement extrusion is less appreciated.      Carbapenem resistant urinary tract infection POA  Recent Gross catheter and also straight cath  Urine culture grew Carbapenem resistant Klebsiella pneumonia  ID consulted, continued Gentamicin through 10/28/22, course completed     Suspected aspiration pneumonia  Acute hypoxic respiratory failure  MRI shows evidence of aspiration pneumonia. Chest x-ray also shows basilar infiltrates with possible effusion  Started empiric antibiotics with cefepime and Flagyl, started 10/25/22  Changed to Cefdinir flagyl, EOT 10/31/22, 7 day course completed. Aspiration precautions. Procal normal  Now weaned off oxygen  Pulmonary evaluated and signed off  Followup CT in 1 year for 3mm pulm nodule     Possible gout flare - improved  Describes knee pain, similar to previous gout flare  Cw Colchicine now on 0.6 daily  C/w allopurinol 100 daily. Chronic pain on methadone  Continue home methadone, increased to 100 mg. Rest of pain meds as above    Debility secondary to CVA and recent left AKA  Continue PT OT     Chronic wounds  Continue wound care     Mild COPD exacerbation  Prn duonebs  S/p prednisone     GERD  Protonix daily     Essential hypertension  Continue PTA Coreg     Hypothyroidism  Continue PTA Synthroid     Left AKA     30.0 - 39.9 Obese / Body mass index is 30.79 kg/m². Code status: Full  Prophylaxis: Lovenox  Recommended Disposition: SNF  BARBARA: Medically ready  Barriers: IPR placement, plan for DC on 11/18     Subjective:     Chief Complaint / Reason for Physician Visit  Follow up for back pain  Patient seen and examined the bedside. More calm today. Appears more comfortable. Agreeable to transition to [facility] tomorrow. Review of Systems:  Symptom Y/N Comments  Symptom Y/N Comments   Fever/Chills n   Chest Pain n    Poor Appetite n   Edema n    Cough n   Abdominal Pain n    Sputum n   Joint Pain y Back pain   SOB/BECK    Pruritis/Rash     Nausea/vomit    Tolerating PT/OT     Diarrhea    Tolerating Diet     Constipation    Other       Could NOT obtain due to:      Objective:     VITALS:   Last 24hrs VS reviewed since prior progress note. Most recent are:  No data found.     No intake or output data in the 24 hours ending 11/18/22 6518 I had a face to face encounter and independently examined this patient on 11/18/2022, as outlined below:  PHYSICAL EXAM:  General: Awake, No acute distress  EENT:  EOMI. Anicteric sclerae. MMM  Resp:  CTA bilaterally, no wheezing or rales. No accessory muscle use  CV:  Regular  rhythm,  No edema  GI:  Soft, Non distended, Non tender. +Bowel sounds, obese  Neurologic:  Alert and oriented X 3, normal speech, Left sided paresis, Let AKA  Psych:   Not anxious nor agitated  Skin:  No rashes. No jaundice    Reviewed most current lab test results and cultures  YES  Reviewed most current radiology test results   YES  Review and summation of old records today    NO  Reviewed patient's current orders and MAR    YES  PMH/SH reviewed - no change compared to H&P  ________________________________________________________________________  Care Plan discussed with:    Comments   Patient y    Family      RN y    Care Manager     Consultant                       y Multidiciplinary team rounds were held today with , nursing, pharmacist and clinical coordinator. Patient's plan of care was discussed; medications were reviewed and discharge planning was addressed. ________________________________________________________________________  Total NON critical care TIME:  36   Minutes    Total CRITICAL CARE TIME Spent:   Minutes non procedure based      Comments   >50% of visit spent in counseling and coordination of care     ________________________________________________________________________  Lucila Kenyon MD     Procedures: see electronic medical records for all procedures/Xrays and details which were not copied into this note but were reviewed prior to creation of Plan. LABS:  I reviewed today's most current labs and imaging studies. Pertinent labs include:  No results for input(s): WBC, HGB, HCT, PLT, HGBEXT, HCTEXT, PLTEXT, HGBEXT, HCTEXT, PLTEXT in the last 72 hours.     Recent Labs     11/18/22  5756 PHOS 5.4*       Signed: Jimmy May MD

## 2022-11-18 NOTE — PROGRESS NOTES
Problem: Pressure Injury - Risk of  Goal: *Prevention of pressure injury  Description: Document Dejuan Scale and appropriate interventions in the flowsheet.   Outcome: Not Progressing Towards Goal  Note: Pressure Injury Interventions:  Sensory Interventions: Assess changes in LOC, Assess need for specialty bed    Moisture Interventions: Absorbent underpads, Apply protective barrier, creams and emollients    Activity Interventions: Assess need for specialty bed, Chair cushion, Increase time out of bed    Mobility Interventions: Assess need for specialty bed, Float heels, HOB 30 degrees or less, Chair cushion    Nutrition Interventions: Document food/fluid/supplement intake, Discuss nutritional consult with provider    Friction and Shear Interventions: Apply protective barrier, creams and emollients, Foam dressings/transparent film/skin sealants, HOB 30 degrees or less, Feet elevated on foot rest                Problem: Pain  Goal: *Control of Pain  Outcome: Not Progressing Towards Goal

## 2022-11-18 NOTE — PROGRESS NOTES
Transition of Care Plan:     RUR: 13%  Disposition: [facility] - Sat 11/19   If SNF or IPR: Date FOC offered: 11/4  Date [address] received: 11/5  Date authorization started with reference number:   Date authorization received and expires:   Accepting facility: [facility]   Follow up appointments: PCP, specialists as indicated   DME needed: N/A   Transportation at Discharge: medical transport   Trula Angelique or means to access home: yes       IM Medicare Letter: N/A  Is patient a [place] and connected with the [place]? No               If yes, was [employer] transfer form completed and [facility] notified? Caregiver Contact: N/A  Discharge Caregiver contacted prior to discharge? Care Conference needed?:  No     [facility] approved by insur after extensive P2P discussions. Bed has not been avbl but is avbl on Sat 11/19 per gabrielleisonJohnathan. Covid PCR done on 11/18 - awaiting result. Transport to be arranged w/ H to . Will need EMTALA documentation.     Jayjay Mckenzie, MSW

## 2022-11-19 VITALS
OXYGEN SATURATION: 93 % | HEART RATE: 71 BPM | WEIGHT: 216.71 LBS | HEIGHT: 72 IN | TEMPERATURE: 99.1 F | RESPIRATION RATE: 18 BRPM | SYSTOLIC BLOOD PRESSURE: 96 MMHG | BODY MASS INDEX: 29.35 KG/M2 | DIASTOLIC BLOOD PRESSURE: 69 MMHG

## 2022-11-19 LAB
PHOSPHATE SERPL-MCNC: 5.6 MG/DL (ref 2.6–4.7)
SARS-COV-2, XPLCVT: NOT DETECTED
SOURCE, COVRS: NORMAL

## 2022-11-19 PROCEDURE — 77030040393 HC DRSG OPTIFOAM GENT MDII -B

## 2022-11-19 PROCEDURE — 74011250637 HC RX REV CODE- 250/637: Performed by: INTERNAL MEDICINE

## 2022-11-19 PROCEDURE — 74011250637 HC RX REV CODE- 250/637: Performed by: STUDENT IN AN ORGANIZED HEALTH CARE EDUCATION/TRAINING PROGRAM

## 2022-11-19 PROCEDURE — 36415 COLL VENOUS BLD VENIPUNCTURE: CPT

## 2022-11-19 PROCEDURE — 74011250636 HC RX REV CODE- 250/636: Performed by: STUDENT IN AN ORGANIZED HEALTH CARE EDUCATION/TRAINING PROGRAM

## 2022-11-19 PROCEDURE — 65270000046 HC RM TELEMETRY

## 2022-11-19 PROCEDURE — 74011250637 HC RX REV CODE- 250/637: Performed by: PHYSICIAN ASSISTANT

## 2022-11-19 PROCEDURE — 77010033678 HC OXYGEN DAILY

## 2022-11-19 PROCEDURE — 94760 N-INVAS EAR/PLS OXIMETRY 1: CPT

## 2022-11-19 PROCEDURE — 84100 ASSAY OF PHOSPHORUS: CPT

## 2022-11-19 RX ORDER — GABAPENTIN 300 MG/1
300 CAPSULE ORAL 3 TIMES DAILY
Qty: 10 CAPSULE | Refills: 0 | Status: SHIPPED | OUTPATIENT
Start: 2022-11-19

## 2022-11-19 RX ORDER — METHADONE HYDROCHLORIDE 10 MG/ML
100 CONCENTRATE ORAL DAILY
Qty: 5 EACH | Refills: 0 | Status: SHIPPED | OUTPATIENT
Start: 2022-11-20 | End: 2022-11-19 | Stop reason: SDUPTHER

## 2022-11-19 RX ORDER — OXYCODONE HYDROCHLORIDE 20 MG/1
40 TABLET ORAL
Qty: 6 TABLET | Refills: 0 | Status: SHIPPED | OUTPATIENT
Start: 2022-11-19 | End: 2022-11-19 | Stop reason: SDUPTHER

## 2022-11-19 RX ORDER — GABAPENTIN 300 MG/1
300 CAPSULE ORAL 3 TIMES DAILY
Qty: 10 CAPSULE | Refills: 0 | Status: SHIPPED | OUTPATIENT
Start: 2022-11-19 | End: 2022-11-19 | Stop reason: SDUPTHER

## 2022-11-19 RX ORDER — OXYCODONE HYDROCHLORIDE 20 MG/1
40 TABLET ORAL
Qty: 6 TABLET | Refills: 0 | Status: SHIPPED | OUTPATIENT
Start: 2022-11-19 | End: 2022-11-22

## 2022-11-19 RX ORDER — METHADONE HYDROCHLORIDE 10 MG/ML
100 CONCENTRATE ORAL DAILY
Qty: 5 EACH | Refills: 0 | Status: SHIPPED | OUTPATIENT
Start: 2022-11-20 | End: 2022-12-20

## 2022-11-19 RX ADMIN — METHADONE HYDROCHLORIDE 100 MG: 10 CONCENTRATE ORAL at 08:33

## 2022-11-19 RX ADMIN — OXYCODONE 40 MG: 5 TABLET ORAL at 03:42

## 2022-11-19 RX ADMIN — ACETAMINOPHEN 650 MG: 325 TABLET ORAL at 06:35

## 2022-11-19 RX ADMIN — CARVEDILOL 3.12 MG: 3.12 TABLET, FILM COATED ORAL at 17:07

## 2022-11-19 RX ADMIN — GABAPENTIN 300 MG: 300 CAPSULE ORAL at 08:33

## 2022-11-19 RX ADMIN — ALLOPURINOL 100 MG: 100 TABLET ORAL at 08:33

## 2022-11-19 RX ADMIN — PANTOPRAZOLE SODIUM 40 MG: 40 TABLET, DELAYED RELEASE ORAL at 06:35

## 2022-11-19 RX ADMIN — OXYCODONE 40 MG: 5 TABLET ORAL at 17:07

## 2022-11-19 RX ADMIN — TAMSULOSIN HYDROCHLORIDE 0.4 MG: 0.4 CAPSULE ORAL at 08:33

## 2022-11-19 RX ADMIN — CARVEDILOL 3.12 MG: 3.12 TABLET, FILM COATED ORAL at 08:33

## 2022-11-19 RX ADMIN — ENOXAPARIN SODIUM 30 MG: 100 INJECTION SUBCUTANEOUS at 08:34

## 2022-11-19 RX ADMIN — CASTOR OIL AND BALSAM, PERU: 788; 87 OINTMENT TOPICAL at 12:30

## 2022-11-19 RX ADMIN — LEVOTHYROXINE SODIUM 125 MCG: 0.12 TABLET ORAL at 06:35

## 2022-11-19 RX ADMIN — TIZANIDINE 2 MG: 2 TABLET ORAL at 17:06

## 2022-11-19 RX ADMIN — OXYCODONE 40 MG: 5 TABLET ORAL at 12:34

## 2022-11-19 RX ADMIN — OXYCODONE 40 MG: 5 TABLET ORAL at 08:33

## 2022-11-19 RX ADMIN — COLCHICINE 0.6 MG: 0.6 TABLET, FILM COATED ORAL at 08:33

## 2022-11-19 RX ADMIN — GABAPENTIN 300 MG: 300 CAPSULE ORAL at 17:07

## 2022-11-19 RX ADMIN — DOXAZOSIN MESYLATE 2 MG: 2 TABLET ORAL at 08:33

## 2022-11-19 RX ADMIN — TIZANIDINE 2 MG: 2 TABLET ORAL at 08:32

## 2022-11-19 RX ADMIN — ACETAMINOPHEN 650 MG: 325 TABLET ORAL at 00:34

## 2022-11-19 RX ADMIN — CALCIUM CARBONATE (ANTACID) CHEW TAB 500 MG 200 MG: 500 CHEW TAB at 08:32

## 2022-11-19 NOTE — PROGRESS NOTES
UPDATE: 12:22PM    CM informed by [facility] liaison that pt is able to transition to facility on today and to arrange transport today at 7:30PM.    Accepting Physician: Dr. Kaylyn Uribe  Call Report: [phone]    CM provided the following information to pts nurse. CM completed the need of the pt at this time. JUVENCIO Tillman, [place]      INITIAL NOTE: CM: Angela Richardson is currently working with pt. CM received call from IPR liaison from [facility], via telephone regarding, pts acceptance at facility. CM notified that [facility] has open bed available today at facility and pt can transition to IPR. CM informed that pts covid test hasnt resulted yet, and test must be resulted before pt transitions to facility. CM informed that facility is willing to accept pt on today at 7:30pm tonight, and that should be enough time for covid results to return. CM informed pts nurse of the following.   SHERRI will continue to follow up with clinical team.    JUVENCIO Tillman, [place] [id number]

## 2022-11-19 NOTE — DISCHARGE SUMMARY
Hospitalist Discharge Summary     Patient ID:  Romy Mack  [id number]  61 y.o.  1965    PCP on record: None    Admit date: 10/16/2022  Discharge date and time: 11/19/2022      DISCHARGE DIAGNOSIS:          CONSULTATIONS:  IP CONSULT TO HOSPITALIST  IP CONSULT TO NEUROSURGERY  IP CONSULT TO NEUROSURGERY  IP CONSULT TO PAIN MANAGEMENT  IP CONSULT TO INTERVENTIONAL RADIOLOGY  IP CONSULT TO ANESTHESIOLOGY  IP CONSULT TO INFECTIOUS DISEASES  IP CONSULT TO PULMONOLOGY    Excerpted HPI from H&P of Holger Roque, DO:    Romy Mack is a 61 y.o.   male with past medical history as listed below who presents with complaints of severe back pain reportedly under shoulder blades reportedly 10/10 pain that is constant/aching as well as mild worsening of baseline shortness of breath secondary to underlying COPD. Patient reports all symptoms began today. He reports no inciting event. Of note, patient had recent prolonged hospitalization at [facility] for left leg wound secondary to pressure injury from immobility due to left-sided deficits from CVA ultimately requiring above-knee amputation and prolonged hospitalization for antibiotics due to sepsis. He reports he was discharged yesterday. Patient also noted to have chronic wounds and skin tears on extremities, which he reports are followed by wound care and has been healing appropriately without concerns for infection. ROS otherwise negative. Patient reports his Chantix prescription ran out and on discharge he resumed smoking estimating he went through about 1/2 pack yesterday he denies alcohol or illicit drug use. In the ED, patient afebrile tachycardic to 100s, hypertensive 140s/90s saturating mid to upper 90s on room air visibly uncomfortable shifting around frequently in bed.   ECG demonstrates sinus tachycardia (rate 103) without definitive ischemic change, rapid COVID-negative, labs demonstrate: Lactic acid 1.77, WBC 17.0, hemoglobin 9.9, platelets 824,-TQCGRSHO 9, proBNP 557, sodium 139, potassium 3.5, BUN 6, creatinine 0.66, UA reflex to culture (cloudy, trace protein, small blood, nitrite positive, large leuk esterase, greater than 100 WBCs, 3+ bacteria). CXR demonstrates mild bibasilar atelectasis. CTA ordered by ED provider, patient reports he is claustrophobic and had anxiety attack in the CT scanner and was unable to complete examination. I discussed importance of full evaluation given unexplained source of pain. Offered medical sedation, which patient accepted and was able to complete CTA chest and CT abdomen pelvis as we would likely only have 1 go at scanning. These demonstrated 2.3 mm right lung nodule, no PE, right subclavian vein stenosis with venous collateralization, increased size and intermediate density left renal lesion, left femoral head avascular necrosis, and age-indeterminate inferior endplate compression fracture of T12 vertebral body     ______________________________________________________________________  DISCHARGE SUMMARY/HOSPITAL COURSE:  for full details see H&P, daily progress notes, labs, consult notes. Leukocytosis WBC 20.9 on 11/10/2022 suspect steroid demargination  Normal WBC 8.0 on 11/3/2022  Started course of decadron 11/3, now stopped  No fevers or SIRS criteria  Suspect demargination  CXR with no new ASD   Procalcitonin < 0.05  Improving     Intractable back pain still real severe  T5 and T6 fracture POA  MRI 10/24/ 2022  Increased subacute T5 partial compression fracture. Unchanged subacute T6 partial compression fracture. Bony retropulsion at T6        causes mild central spinal canal stenosis without cord compression.   Chronic compression fracture of T12  Kyphoplasty performed 10/27/22 at T5 and T6   Continued to have severe pain   Repeat MRI with anesthesia on 11/2 showed:   Cement at T6 on the left has migrated posteriorly since the kyphoplasty   resulting in cord compression and mild cord edema. Neurosurgery Dr. Marco Argueta saw, doesn't think he needs surgical intervention   doesn't have significant weakness of the RLE  Discussed with Radiology, recommended doing CT thoracic spine  CT reviewed, looks like cement displaced. No treatment as such for this  Pain remains uncontrolled    Increase methadone 100 mg daily  Increase oxycodone to 40 mg q4 PRN  Continue lidocaine patch  Continue scheduled tylenol  Increase neurontin to 300 mg TID  PT/OT recommending IPR. [facility]. [facility] accepted. Plan to transition to [facility] tomorrow  S/p decadron taper  Toradol PRN x 2 days  Repeat CT continue to show : mild cord compression, Chronic T5 and T6 compression fracture s/p khypo, cement extrusion is less appreciated. Results discussed with neurosurgery and IR. No further intervention required     Carbapenem resistant urinary tract infection POA  Recent Gross catheter and also straight cath  Urine culture grew Carbapenem resistant Klebsiella pneumonia  ID consulted, continued Gentamicin through 10/28/22, course completed     Suspected aspiration pneumonia  Acute hypoxic respiratory failure  MRI shows evidence of aspiration pneumonia. Chest x-ray also shows basilar infiltrates with possible effusion  Started empiric antibiotics with cefepime and Flagyl, started 10/25/22  Changed to Cefdinir flagyl, EOT 10/31/22, 7 day course completed. Aspiration precautions. Procal normal  Now weaned off oxygen  Pulmonary evaluated and signed off  Followup CT in 1 year for 3mm pulm nodule     Possible gout flare - improved  Describes knee pain, similar to previous gout flare  Cw Colchicine now on 0.6 daily  C/w allopurinol 100 daily. Chronic pain on methadone  Continue home methadone, increased to 100 mg.  Rest of pain meds as above    Debility secondary to CVA and recent left AKA  Continue PT OT     Chronic wounds  Continue wound care     Mild COPD exacerbation  Prn duonebs  S/p prednisone     GERD  Protonix daily     Essential hypertension  Continue PTA Coreg     Hypothyroidism  Continue PTA Synthroid     Left AKA        _______________________________________________________________________  Patient seen and examined by me on discharge day. Pertinent Findings:  Gen:    Not in distress  Chest: Clear lungs  CVS:   Regular rhythm. No edema  Abd:  Soft, not distended, not tender  Neuro:  Alert, Oriented x 4, grossly non focal exam  _______________________________________________________________________  DISCHARGE MEDICATIONS:   Current Discharge Medication List        START taking these medications    Details   lidocaine 4 % patch Apply to back  Qty: 7 Patch, Refills: 1  Start date: 11/7/2022           CONTINUE these medications which have CHANGED    Details   methadone (DOLOPHINE) 10 mg/mL solution Take 10 mL by mouth daily for 30 days. Max Daily Amount: 100 mg. Indications: excessive pain  Qty: 5 Each, Refills: 0  Start date: 11/20/2022, End date: 12/20/2022    Associated Diagnoses: Closed fracture of fifth thoracic vertebra, unspecified fracture morphology, initial encounter ([facility])      oxyCODONE IR (ROXICODONE) 20 mg immediate release tablet Take 2 Tablets by mouth every four (4) hours as needed for Pain for up to 3 days. Max Daily Amount: 240 mg.  Qty: 6 Tablet, Refills: 0  Start date: 11/19/2022, End date: 11/22/2022    Associated Diagnoses: Closed fracture of fifth thoracic vertebra, unspecified fracture morphology, initial encounter [facility])      ! ! gabapentin (NEURONTIN) 300 mg capsule Take 1 Capsule by mouth three (3) times daily. Max Daily Amount: 900 mg. Qty: 10 Capsule, Refills: 0  Start date: 11/19/2022    Associated Diagnoses: Closed fracture of fifth thoracic vertebra, unspecified fracture morphology, initial encounter [facility])       ! ! - Potential duplicate medications found. Please discuss with provider.         CONTINUE these medications which have NOT CHANGED Details   carvediloL (COREG) 6.25 mg tablet Take 6.25 mg by mouth two (2) times daily (with meals). doxazosin (CARDURA) 2 mg tablet Take 2 mg by mouth daily. pantoprazole (PROTONIX) 40 mg tablet Take 40 mg by mouth daily. metFORMIN ER (GLUCOPHAGE XR) 500 mg tablet Take 500 mg by mouth daily. tamsulosin (FLOMAX) 0.4 mg capsule Take 1 capsule by mouth daily after dinner  Qty: 30 Capsule, Refills: 3    Associated Diagnoses: Benign localized prostatic hyperplasia with lower urinary tract symptoms (LUTS)      levothyroxine (SYNTHROID) 125 mcg tablet Take 125 mcg by mouth Daily (before breakfast). !! gabapentin (NEURONTIN) 100 mg capsule Take 200 mg by mouth three (3) times daily. allopurinoL (ZYLOPRIM) 300 mg tablet Take 1 Tablet by mouth.      colchicine 0.6 mg tablet Take 0.6 mg by mouth daily as needed for Gout or Pain. Indications: acute inflammation of the joints due to gout attack       ! ! - Potential duplicate medications found. Please discuss with provider. STOP taking these medications       predniSONE (DELTASONE) 20 mg tablet Comments:   Reason for Stopping:         ibuprofen (MOTRIN) 200 mg tablet Comments:   Reason for Stopping:         melatonin 3 mg tablet Comments:   Reason for Stopping:               My Recommended Diet, Activity, Wound Care, and follow-up labs are listed in the patient's Discharge Insturctions which I have personally completed and reviewed.   Risk of deterioration: High    Condition at Discharge:  Stable  _____________________________________________________________________    Disposition  [facility]  ____________________________________________________________________    Care Plan discussed with:   Patient, Family, RN, Care Manager, Consultant    ____________________________________________________________________    Code Status: Full Code  ____________________________________________________________________      Condition at Discharge: Stable  _____________________________________________________________________  Follow up with:   PCP : None  Follow-up Information       Follow up With Specialties Details Why Contact Info    None    None ([phone]) Patient stated that they have no PCP      Sarath Quiros MD Sports Medicine Physician Follow up  [place] [id number]  [phone]                  Total time in minutes spent coordinating this discharge (includes going over instructions, follow-up, prescriptions, and preparing report for sign off to her PCP) :  35 minutes    Signed:  Aaliyah Montana MD

## 2022-11-19 NOTE — PROGRESS NOTES
Problem: Patient Education: Go to Patient Education Activity  Goal: Patient/Family Education  Outcome: Progressing Towards Goal     Problem: Patient Education: Go to Patient Education Activity  Goal: Patient/Family Education  Outcome: Progressing Towards Goal     Problem: Pressure Injury - Risk of  Goal: *Prevention of pressure injury  Description: Document Dejuan Scale and appropriate interventions in the flowsheet. Outcome: Progressing Towards Goal  Note: Pressure Injury Interventions:  Sensory Interventions: Assess changes in LOC    Moisture Interventions: Absorbent underpads, Apply protective barrier, creams and emollients    Activity Interventions: PT/OT evaluation, Assess need for specialty bed    Mobility Interventions: Float heels, PT/OT evaluation, Assess need for specialty bed    Nutrition Interventions: Document food/fluid/supplement intake    Friction and Shear Interventions: Apply protective barrier, creams and emollients, Foam dressings/transparent film/skin sealants, HOB 30 degrees or less, Feet elevated on foot rest                Problem: Patient Education: Go to Patient Education Activity  Goal: Patient/Family Education  Outcome: Progressing Towards Goal     Problem: Falls - Risk of  Goal: *Absence of Falls  Description: Document Jordon Fall Risk and appropriate interventions in the flowsheet.   Outcome: Progressing Towards Goal  Note: Fall Risk Interventions:  Mobility Interventions: Bed/chair exit alarm    Mentation Interventions: Adequate sleep, hydration, pain control, Bed/chair exit alarm    Medication Interventions: Bed/chair exit alarm    Elimination Interventions: Call light in reach, Bed/chair exit alarm    History of Falls Interventions: Bed/chair exit alarm, Consult care management for discharge planning         Problem: Patient Education: Go to Patient Education Activity  Goal: Patient/Family Education  Outcome: Progressing Towards Goal     Problem: Pain  Goal: *Control of Pain  Outcome: Progressing Towards Goal     Problem: Patient Education: Go to Patient Education Activity  Goal: Patient/Family Education  Outcome: Progressing Towards Goal     Problem: Patient Education: Go to Patient Education Activity  Goal: Patient/Family Education  Outcome: Progressing Towards Goal

## 2022-11-20 NOTE — PROGRESS NOTES
Bedside and Verbal shift change report given to Stacy URIAS, [address] [facility] (oncoming nurse) by Adrian Snell RN (offgoing nurse). Report included the following information SBAR, Kardex, Intake/Output, MAR, and Recent Results. Pt was in chronic pain all day stated nothing was working and he was getting around the clock pain meds. Pt has a discharge set for 7:30pm to go to [facility]. Pt got moodier and moodier until his discharge. He refused afternoon vitals and the flu shot. He did agree to take vitals before he left when told he had too before leaving.  His transportation arrived promptly at 7:30pm and he did everything to stall them and did not leave until 8:15pm.    Adrian Snell RN

## 2022-11-20 NOTE — PROGRESS NOTES
Problem: Patient Education: Go to Patient Education Activity  Goal: Patient/Family Education  Outcome: Resolved/Met     Problem: Patient Education: Go to Patient Education Activity  Goal: Patient/Family Education  Outcome: Resolved/Met     Problem: Pressure Injury - Risk of  Goal: *Prevention of pressure injury  Description: Document Dejuan Scale and appropriate interventions in the flowsheet. Outcome: Resolved/Met  Note: Pressure Injury Interventions:  Sensory Interventions: Assess changes in LOC    Moisture Interventions: Absorbent underpads, Apply protective barrier, creams and emollients    Activity Interventions: PT/OT evaluation    Mobility Interventions: Float heels, Pressure redistribution bed/mattress (bed type)    Nutrition Interventions: Document food/fluid/supplement intake    Friction and Shear Interventions: Apply protective barrier, creams and emollients, Foam dressings/transparent film/skin sealants, HOB 30 degrees or less, Feet elevated on foot rest                Problem: Patient Education: Go to Patient Education Activity  Goal: Patient/Family Education  Outcome: Resolved/Met     Problem: Falls - Risk of  Goal: *Absence of Falls  Description: Document Jordon Fall Risk and appropriate interventions in the flowsheet.   Outcome: Resolved/Met  Note: Fall Risk Interventions:  Mobility Interventions: Bed/chair exit alarm    Mentation Interventions: Adequate sleep, hydration, pain control    Medication Interventions: Bed/chair exit alarm    Elimination Interventions: Call light in reach    History of Falls Interventions: Bed/chair exit alarm         Problem: Patient Education: Go to Patient Education Activity  Goal: Patient/Family Education  Outcome: Resolved/Met     Problem: Pain  Goal: *Control of Pain  Outcome: Resolved/Met     Problem: Patient Education: Go to Patient Education Activity  Goal: Patient/Family Education  Outcome: Resolved/Met     Problem: Patient Education: Go to Patient Education Activity  Goal: Patient/Family Education  Outcome: Resolved/Met

## 2022-12-02 NOTE — ADT AUTH CERT NOTES
Utilization Reviews       Respiratory Failure GRG - Care Day 27 (11/11/2022) by Joycie Hatchet       Review Status Review Entered   Completed 11/15/2022 1236       Created By   Joycie Hatchet      Criteria Review      Care Day: 27 Care Date: 11/11/2022 Level of Care: Telemetry    Guideline Day 3    Level Of Care    ( ) * Activity level acceptable    11/15/2022 12:35:07 EST by Анна Fournierm - Pt engages in minimal seated exercises and weight shifting while at EOB. Demos ability to complete small ROM in LLE; OT -  Pt noted good static seated balance with no noted LOB during EOB self feeding and grooming, requiring Min A to come to EOB    ( ) Floor to discharge    11/15/2022 12:35:07 EST by Joycie Hatchet      MED TELE    Clinical Status    (X) * Ventilation status appropriate    (X) * Airway status acceptable    (X) * Respiratory status acceptable    11/15/2022 12:35:07 EST by Joycie Hatchet      RR 18; O2 94% RA    ( ) * Stable chest findings    11/15/2022 12:35:07 EST by Joycie Hatchet      XR CHEST PORT Impression: Improving aeration of the LEFT lung with mild residual airspace disease at the   LEFT lung base.     (X) * Neurologic status acceptable    11/15/2022 12:35:07 EST by Dyan Howe and oriented X4    (X) * Temperature status acceptable    11/15/2022 12:35:07 EST by Joycie Hatchet      T 98.4 (36.9)    (X) * No infection, or status acceptable    11/15/2022 12:35:07 EST by Joycie Hatchet      Leukocytosis WBC 20.9 on 11/10/2022; WBC: 18.2 (H)    ( ) * General Discharge Criteria met    Interventions    (X) * No chest tube, or status acceptable    (X) * Intake acceptable    11/15/2022 12:36:04 EST by Joycie Hatchet      Flomax 0.4mg PO daily, Zanaflex 2mg PO TID,   Potassium chloride 40meq PO X1, Neurontin 100mg PO TID, Roxicodone 30mg PO Q4 PRN X5    11/15/2022 12:35:07 EST by Joycie Hatchet      Regular adult diet; PO - Tylenol 650mg PO Q6,   Zyloprim 100mg PO daily, Tums 200mg PO TID,   Coreg 3.125mg PO BID, Colchicine 0.6mg PO daily,   Cardura 2mg PO daily, Synthroid 125mcg PO daily, Dolophine 60mg PO daily, Protonix 40mg PO daily    ( ) * No inpatient interventions needed    11/15/2022 12:36:04 EST by Antoinette López      OT - Patient continues with skilled OT services and is very slowly progressing towards goals. Pt requiring HOB to be elevated to highest level. * Milestone   Additional Notes    11/11      Pertinent Updates:   *CM Disposition: [facility] IPR - insurance denied - CM noted P2P appeal       Vitals:   HR 41, 59   /54   8/10 Aching constant pain on abdomen and back      Abnl/Pertinent Labs/Radiology/Diagnostic Studies:   HGB: 10.8 (L)   HCT: 34.9 (L)   MCV: 78.1 (L)   MCH: 24.2 (L)   RDW: 17.0 (H)   ABS. NEUTROPHILS: 13.7 (H)   ABS. MONOCYTES: 1.3 (H)   ABS. EOSINOPHILS: 0.5 (H)   Leukocyte Esterase: MODERATE ! CA Oxalate crystals: 1+ ! Physical Exam: No significant change      Hospitalist Progress Note   Assessment / Plan:   CXR with no new ASD    Procalcitonin < 0.05      *No update in current treatment plan, cont present management*      Medications:   Venelex ointment TP BID   Lovenox 30mg SC Q12   Lidocaine 2 patch TD Q24      Orders:   AAT w assist      PT/OT/SLP/CM Assessments or Notes:   OT ASSESSMENT: Pt reports desires for IPR upon discharge; however, reporting therapist believes pt would benefit from SNF rehab with possible transition to LTC given limited therapeutic participation/gains during current hospitalization. Acute OT to continue to follow during acute hospitalization. Current Level of Function Impacting Discharge (ADLs): Mod A at bed level       Other factors to consider for discharge: limited therapeutic progression, unable to progress to OOB activity      PT ASSESSMENT: Pt agreeable to EOB activity this day. Transitions to EOB with Min A and requiring HOB to be full elevated.  Sits EOB >15 minutes with no LOB and intact sitting balance. Pt states he was supposed to meet with a prosthetists but he was in the hospital at the time. Would benefit from SNF though pt is adamant he wants to go to [facility]. Current Level of Function Impacting Discharge (mobility/balance): only sits EOB with therapy       Other factors to consider for discharge: limited participation and progress with therapy acutely       PLAN :   Recommendation for discharge:    Therapy up to 5 days/week in SNF setting      PLAN :   Recommend with staff: Frequent positional changed, bed level ADL engagement   Recommend next OT session: POC progression   Recommendation for discharge:    To be determined: Pt desires IPR, reporting therapist recommending SNF with possible transition to LTC        Respiratory Failure [facility] Day 26 (11/10/2022) by Denny Plata       Review Status Review Entered   Completed 11/11/2022 1557       Created By   Denny Plata      Criteria Review      Care Day: 26 Care Date: 11/10/2022 Level of Care: Telemetry    Guideline Day 3    Level Of Care    ( ) * Activity level acceptable    Clinical Status    (X) * Ventilation status appropriate    11/11/2022 15:57:54 EST by Lenoard Dolin      spo2 94 on rra    (X) * Airway status acceptable    11/11/2022 15:57:54 EST by Lenoard Dolin      acceptable    (X) * Respiratory status acceptable    11/11/2022 15:57:54 EST by Lenoard Dolin      acceptable    ( ) * Stable chest findings    (X) * Neurologic status acceptable    11/11/2022 15:57:54 EST by Lenoard Dolin      acceptable    (X) * Temperature status acceptable    11/11/2022 15:57:54 EST by Lenoard Dolin      temp 98.4    (X) * No infection, or status acceptable    11/11/2022 15:57:54 EST by Lenoard Dolin      none noted    ( ) * General Discharge Criteria met    Interventions    (X) * No chest tube, or status acceptable    11/11/2022 15:57:54 EST by Lenoard Dolin      ches tube absent    (X) * Intake acceptable    11/11/2022 15:57:54 EST by Clearence Expose      regualr diet    ( ) * No inpatient interventions needed    * Milestone   Additional Notes   Care day 26    11/10/22   LOC IP Telemetry       \"Pain is about the same\" despite increased oxycodone   Awaiting rehab approval   Asking to increase pain meds      98.4 °F (36.9 °C)   (!) 56   18   (!) 100/58   94 % RA      PHYSICAL EXAM:   General:          Awake, No acute distress   EENT:              EOMI. Anicteric sclerae. MMM   Resp:               CTA bilaterally, no wheezing or rales. No accessory muscle use   CV:                  Regular  rhythm,  No edema   GI:                   Soft, Non distended, Non tender. +Bowel sounds   Neurologic:       Alert and oriented X 3, normal speech,    Psych:   Not anxious nor agitated   Ext: Power 4/5 on RLE, left AKA (has prior weakness)   Power 5/5 on RUE and has residual weakness on left      IM Assessment / Plan:       Leukocytosis WBC 20.9 on 11/10/2022   Normal WBC 8.0 on 11/3/2022   Started course of decadron 11/3, stopped 24 hours ago   No fevers or SIRS criteria   Suspect demargination   Check CXR and UA   BC with fevers   Check procalcitonin   Hold antibiotics       Intractable back pain    T5 and T6 fracture POA   MRI 10/24/ 2022   Increased subacute T5 partial compression fracture. Unchanged subacute T6 partial compression fracture. Bony retropulsion at T6         causes mild central spinal canal stenosis without cord compression. 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    Neurosurgery Dr. Meghana Jonas saw, doesn't think he needs surgical intervention    doesn't have significant weakness of the RLE   Discussed with Radiology, recommended doing CT thoracic spine   CT reviewed, looks like cement displaced. No treatment as such for this   Pain control with , methadone 60 mg daily, oxycodone PRN. PT/OT recommending IPR. [facility]. Tapering decadron, now off   Medically ready pending placement       Carbapenem resistant urinary tract infection POA   Recent Gross catheter and also straight cath   Urine culture grew Carbapenem resistant Klebsiella pneumonia   ID consulted, continued Gentamicin through 10/28/22, course completed       Suspected aspiration pneumonia   Acute hypoxic respiratory failure   MRI shows evidence of aspiration pneumonia. Chest x-ray also shows basilar infiltrates with possible effusion   Started empiric antibiotics with cefepime and Flagyl, started 10/25/22   Changed to Cefdinir flagyl, EOT 10/31/22, 7 day course completed. Aspiration precautions. Procal normal   Now weaned off oxygen   Pulmonary evaluated and signed off   Followup CT in 1 year for 3mm pulm nodule       Possible gout flare - improved   Describes knee pain, similar to previous gout flare   Cw Colchicine now on 0.6 daily   Started allopurinol 100 daily. Chronic pain on methadone   Continue home methadone. Rest of pain meds as above      Debility secondary to CVA and recent left AKA   Continue PT OT       Chronic wounds   Continue wound care       Mild COPD exacerbation   Prn duonebs   S/p prednisone       GERD   Protonix daily       Essential hypertension   Continue PTA Coreg       Hypothyroidism   Continue PTA Synthroid       Left AKA       30.0 - 39.9 Obese / Body mass index is 30.79 kg/m². Code status: Full   Prophylaxis: Lovenox   Recommended Disposition: [facility]   BARBARA: Medically ready   Barriers: IPR placement      Physical Therapy       Pt received supine in bed, c/o  phantom pains with LLE at 10/10 with pain, sitting in soiled linens and declined any Therapy at this time.  Rn requested assistance with rolling with pt for linen change, with pt reporting that he needed to have a BM and requested the bed menon. Max rio for rolling to L side with reports of pain. Revisited with Rn reporting that pt allowed her to change the linens with reports of Pt able to R/L with BLE flexion to aid ludwig care. Pt continues to report that he would not do therapy here but he would do it at [facility]. PT deferred at this time      CM Transition of Care Plan:       RUR:13%   Disposition:[facility] IPR   Follow up appointments:rehab   DME needed:new hospital bed with trapeze and mattress/pad       Meds:    Tylneol 650 mg po q6    Allopurinol 100 mg po daily    Colchicine 0.6 mg po daily    Cardura 2mg po daily    Lovenox 30mg sc q12    Gabapentin 100 mg po tid    Synthroid 125 mcg po daily    Methadone 60mg po daily    Roxicodone 30mg po q4 prn x3   Protonix 40mg po daily    Flomax 0.4 mg po daily    Zanaflex 2mg po tid       Orders:    Daily wound care    Ics cont    Aspiration precautions    I&O    Telemetry       Labs:       11/10/22 10:37   Phosphorus: 3.5         11/10/22 14:04   WBC: 20.9 (H)   HGB: 10.8 (L)   HCT: 34.3 (L)   MCV: 77.6 (L)   MCH: 24.4 (L)   RDW: 16.5 (H)   NEUTROPHILS: 82 (H)   LYMPHOCYTES: 10 (L)   ABS. NEUTROPHILS: 17.1 (H)   ABS. MONOCYTES: 1.7 (H)         Sodium: 137   Potassium: 3.2 (L)   Glucose: 116 (H)   BUN: 23 (H)   BUN/Creatinine ratio: 30 (H)   Calcium: 8.1 (L)                 Respiratory Failure GRG - Care Day 25 (11/9/2022) by Nimesh Tipton       Review Status Review Entered   Completed 11/10/2022 0843       Created By   Nimesh Tipton      Criteria Review      Care Day: 25 Care Date: 11/9/2022 Level of Care: Telemetry    Guideline Day 3    Level Of Care    ( ) * Activity level acceptable    11/10/2022 08:41:50 EST by Nimesh Tipton      Per OT - Pt refused bed mobility tasks and all out of bed mobility activities. Continue OT pending pt's motivation to actively participate in therapy.     ( ) Floor to discharge    11/10/2022 08:41:50 EST by Jose Farooq TELE    Clinical Status    (X) * Ventilation status appropriate    11/10/2022 08:41:50 EST by Alexander Garcia at baseline 94% RA    (X) * Airway status acceptable    (X) * Respiratory status acceptable    11/10/2022 08:41:50 EST by Catrachita Carrillo      stable RR 18    ( ) * Stable chest findings    11/10/2022 08:41:50 EST by Catrachita Carrillo      MRI shows evidence of aspiration pneumonia. Chest x-ray also shows basilar infiltrates with possible effusion    (X) * Neurologic status acceptable    11/10/2022 08:41:50 EST by Catrachita Carrillo      alert and oriented x4    (X) * Temperature status acceptable    11/10/2022 08:41:50 EST by Catrachita Carrillo      98.4 °F (36.9 °C)    (X) * No infection, or status acceptable    ( ) * General Discharge Criteria met    Interventions    (X) * No chest tube, or status acceptable    (X) * Intake acceptable    11/10/2022 08:43:02 EST by Catrachita Carrillo      Roxicodone 20mg PO Q3 PRN X4,   Protonix 40mg PO daily,   Flomax 0.4mg PO daily,   Zanaflex 2mg PO TID    11/10/2022 08:41:50 EST by Catrachita Carrillo      Regular adult diet; PO - Tylenol 650mg PO Q6,   Zyloprim 100mg PO daily, Coreg 3.125mg PO BID,   Colchicine 0.6mg PO daily, Synthroid 125mcg PO daily,   Cardura 2mg PO daily, Neurontin 100mg PO TID, Melatonin 3mg PO HS PRN X1, Dolophine 60mg PO daily    ( ) * No inpatient interventions needed    11/10/2022 08:41:50 EST by Tamara [address] [place] [address] Place spirometry cont,   Neurologic status assessment cont,   Oximetry cont; IV HTN meds - Apresoline 10mg IV Q6 PRN X1; cont PT/OT    * Milestone   Additional Notes    11/09      Pertinent Updates:   *COVID-19 rapid test: Not detected       Vitals: 55 190/84 153/75        Physical Exam: No significant change      Hospitalist Progress Note   Assessment/Plan:   - Pain control with , methadone 60 mg daily, oxycodone PRN.  Stop dilaudid   - PT/OT recommending IPR   - Tapering decadron (would to 4 mg q12 hours x 3 days, 2 mg q12 hours for 3 days and stop)   - Medically ready pending placement   - Aspiration precautions. - Cw Colchicine now on 0.6 daily   - Started allopurinol 100 daily. Was prescribed 300 daily but not taking consistently, so essentially starting over      Medications:   Venelex ointment TP BID   Lovenox 30mg SC Q12   Lidocaine 2 patch TD Q24      Orders:   AAT w assist; OOB w assistance   Aspiration precautions      PT/OT/SLP/CM Assessments or Notes:   OT ASSESSMENT: Pt presented in supine and agreeable to therapy session. Pt just received lunch tray and requested assist for opening food packages/cutlery. Pt req'd maxA for cutting, spreading, and opening packages. Pt requested wash cloth for brief grooming task while in long sitting with setup assist only. Functional Outcome Measure:  15/100 on the Barthel Index outcome measure = totally dependent for ADLs. Current Level of Function Impacting Discharge (ADLs): maxA overall        Other factors to consider for discharge: encourage active participation in therapy        PLAN :   Continue treatment per established plan of care to address goals. Recommendation for discharge:    Therapy 3 hours per day 5-7 days per week      PT Note: Attempted PT treatment. Pt requested defer session to allow time to finish lunch as tray recently arrived. Will con't to follow. Addendum: returned for second attempt, pt still eating dessert and requested PT return later.

## 2023-04-20 NOTE — PROGRESS NOTES
Hospitalist Progress Note    NAME: Sunitha Prater   :  1965   MRN:  [id number]     Presented to ED with worsening L LE swelling/pain. [facility] [place]   Attempted to get record from [facility] nut medical record was closed on weekend. Assessment / Plan:  Bilateral LE cellulitis POA with purulent drainage  Chronic non healing L Foot ulcer/wound with cellulitis POA  - R LE swelling - slowly improving. Leukocytosis is down. L foot drain persist   Continue to report that pain is not controlled thou looks comfortable   Baseline: chronic left foot wound. eft knee contracture and swelling lower leg related to previous stroke. -AB: zosyn day 7, vanco dc   BC NTD, wound cultures: pseudomonas S zosyn     ID help appreciated   -MRI - unable to complete despite 3 attempts ( per pt due to pain/claustrophobia thou MRI is for foot not head)  D/w pt claustrophobia , he stated \" it is in my head\"   Attempting MRI with conscious sedation    --pain management: appreciated palliative care team greatly  1. Pain level should be based on observation, not patient rating. 2. Increase the Methadone to 80mg/day. 3. Increase the Dilaudid to 8mg PO q. 4 hours prn.  4. Discontinue the IV Dilaudid. 5. When pt is ready for discharge, do not provide any opioid prescriptions. The methadone will prevent withdrawal symptoms when the dilaudid is stopped. 6. Pt will need to follow-up with [facility]. Primary pain specialist: Dr. Jone Grover  --vascular surgery help was GREATLY appreciated:   Pt seen twice by Dr Juanpablo Booth  and as second opinion Dr Wade Lakhani   Both feel that patient has adequate arterial profusion to undergo debridement of tissue and possibly bone left foot.   --podiatry help was appreciated:  Dr Kings Antunez planning debridement    -LE dopplers negative for DVT     Essential HTN POA  -BP high side   -inc home lisinopril , watch cr/K with it     Bladder Patient wants to know what the next steps are after the Abdominal Ultrasound. Patient said he still feels bloated and his abdomen still looks distended. (Patient said this is how he felt before the abdominal ultrasound)       mass   --Per pt he was diagnosed with a bladder mass at [facility] per pt  --still did not get record from [facility]   --follow with urology OP as planned previously ( per pt he has appointment)     Left Hemiparesis from Old CVA POA  Chronic Pain syndrome on maintenance Methadone at [facility] 65 mg daily   Hyperlipidemia, not on meds at home   Gout arthritis, cont allopurinol               Code Status: Full  Surrogate Decision Maker: sister Stefany Fisher # [phone]  DVT Prophylaxis: SQ Lovenox    Baseline: lives alone, ambulating with cane  Body mass index is 27.44 kg/(m^2). Recommended Disposition: TBD     Subjective:     Chief Complaint / Reason for Physician Visit: left foot cellulitis / HTN   Pain: on entering room pt was comfortably sleeping but when woke up stated \" pain is 10/10\"     Discussed with RN events overnight. Review of Systems:  Symptom Y/N Comments  Symptom Y/N Comments   Fever/Chills n   Chest Pain n    Poor Appetite    Edema     Cough    Abdominal Pain n    Sputum    Joint Pain     SOB/BECK n   Pruritis/Rash     Nausea/vomit n   Tolerating PT/OT     Diarrhea n   Tolerating Diet     Constipation n   Other       Could NOT obtain due to:      Objective:     VITALS:   Last 24hrs VS reviewed since prior progress note. Most recent are:  Patient Vitals for the past 24 hrs:   Temp Pulse Resp BP SpO2   12/27/17 1428 98.4 °F (36.9 °C) (!) 59 18 (!) 196/96 97 %   12/27/17 0643 97.8 °F (36.6 °C) (!) 53 18 138/84 98 %   12/27/17 0217 98.3 °F (36.8 °C) (!) 55 18 166/83 97 %   12/26/17 2228 98.2 °F (36.8 °C) (!) 55 18 (!) 176/94 97 %   12/26/17 1730 97.9 °F (36.6 °C) 69 18 141/81 98 %       Intake/Output Summary (Last 24 hours) at 12/27/17 1454  Last data filed at 12/27/17 0913   Gross per 24 hour   Intake              250 ml   Output             2700 ml   Net            -2450 ml        PHYSICAL EXAM:  General: WD, WN. Alert, cooperative, no acute distress    EENT:  EOMI.  Anicteric sclerae. MMM  Resp:  CTA bilaterally, no wheezing or rales. No accessory muscle use  CV:  Regular  rhythm,  R LE swelling is much better    GI:  Soft, Non distended, Non tender.  +Bowel sounds  Neurologic:  Alert and oriented X 3, normal speech,   Psych:   Good insight. Not anxious nor agitated  Skin:  No rashes. No jaundice                          Wound seen 12/26 12/22 12/26              Reviewed most current lab test results and cultures  YES  Reviewed most current radiology test results   YES  Review and summation of old records today    NO  Reviewed patient's current orders and MAR    YES  PMH/SH reviewed - no change compared to H&P  ________________________________________________________________________  Care Plan discussed with:    Comments   Patient y    Family      RN y    Care Manager     Consultant  y palliative care team                      Multidiciplinary team rounds were held today with , nursing, pharmacist and clinical coordinator. Patient's plan of care was discussed; medications were reviewed and discharge planning was addressed. ________________________________________________________________________  Total NON critical care TIME:  35 Minutes    Total CRITICAL CARE TIME Spent:   Minutes non procedure based      Comments   >50% of visit spent in counseling and coordination of care     ________________________________________________________________________  Manan Orlando MD     Procedures: see electronic medical records for all procedures/Xrays and details which were not copied into this note but were reviewed prior to creation of Plan. LABS:  I reviewed today's most current labs and imaging studies.   Pertinent labs include:  Recent Labs      12/26/17   0429   WBC  9.2   HGB  11.3*   HCT  35.9*   PLT  249     Recent Labs      12/27/17   0635  12/26/17   0429  12/25/17   0441   NA  138  136  138   K  4.1  4.0  4.0 CL  102  101  103   CO2  29  29  28   GLU  99  98  125*   BUN  10  10  9   CREA  0.57*  0.63*  0.64*   CA  8.8  8.7  8.2*   MG   --   2.0   --    PHOS   --   3.6   --        Signed: Denise Barros MD

## 2023-05-15 NOTE — PROGRESS NOTES
[facility] Physical Therapy    Attempted PT treatment. Pt requested defer session to allow time to finish lunch as tray recently arrived. Will con't to follow. Addendum: returned for second attempt at 13;25, pt still eating dessert and requested PT return later.      Milvia Bahena, PT, MPT

## 2023-06-23 NOTE — PROGRESS NOTES
Hospitalist Progress Note    NAME: Ton Zapata   :  1965   MRN:  [id number]       Assessment / Plan:    Leukocytosis WBC 20.9 on 11/10/2022 suspect steroid demargination  Normal WBC 8.0 on 11/3/2022  Started course of decadron 11/3, stopped 24 hours ago  No fevers or SIRS criteria  Suspect demargination  CXR with no new ASD   BC with fevers  Procalcitonin < 0.05  Hold antibiotics  Slowly decreasing    Intractable back pain still real severe  T5 and T6 fracture POA  MRI 10/24/ 2022  Increased subacute T5 partial compression fracture. Unchanged subacute T6 partial compression fracture. Bony retropulsion at T6        causes mild central spinal canal stenosis without cord compression. 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 Neurosurgery Dr. Lance Bunn saw, doesn't think he needs surgical intervention   doesn't have significant weakness of the RLE  Discussed with Radiology, recommended doing CT thoracic spine  CT reviewed, looks like cement displaced. No treatment as such for this  Pain remains uncontrolled    Continue methadone 60 mg daily  Increase oxycodone to 40 mg q4 PRN  Continue lidocaine patch  Continue scheduled tylenol  Increase neurontin to 300 mg TID  PT/OT recommending IPR. [facility].    [address] denied, appeal pending    ([phone], Ref [id number]  S/p decadron taper  Toradol PRN x 2 days  Check CT thoracic spine with worsening pain     Carbapenem resistant urinary tract infection POA  Recent Gross catheter and also straight cath  Urine culture grew Carbapenem resistant Klebsiella pneumonia  ID consulted, continued Gentamicin through 10/28/22, course completed     Suspected aspiration pneumonia  Acute hypoxic respiratory failure  MRI shows evidence of aspiration pneumonia. Chest x-ray also shows basilar infiltrates with possible effusion  Started empiric antibiotics with cefepime and Flagyl, started 10/25/22  Changed to Cefdinir flagyl, EOT 10/31/22, 7 day course completed. Aspiration precautions. Procal normal  Now weaned off oxygen  Pulmonary evaluated and signed off  Followup CT in 1 year for 3mm pulm nodule     Possible gout flare - improved  Describes knee pain, similar to previous gout flare  Cw Colchicine now on 0.6 daily  Started allopurinol 100 daily. Chronic pain on methadone  Continue home methadone. Rest of pain meds as above    Debility secondary to CVA and recent left AKA  Continue PT OT     Chronic wounds  Continue wound care     Mild COPD exacerbation  Prn duonebs  S/p prednisone    GERD  Protonix daily     Essential hypertension  Continue PTA Coreg     Hypothyroidism  Continue PTA Synthroid     Left AKA     30.0 - 39.9 Obese / Body mass index is 30.79 kg/m². Code status: Full  Prophylaxis: Lovenox  Recommended Disposition: SNF  BARBARA: Medically ready  Barriers: IPR placement    Subjective:     Chief Complaint / Reason for Physician Visit  Follow up for vertebral fracture  \"The is so severe\" severe mid back pain, not relieved with current pain meds  SA[facility] denied, appear pending    Review of Systems:  Symptom Y/N Comments  Symptom Y/N Comments   Fever/Chills n   Chest Pain n    Poor Appetite    Edema n    Cough n   Abdominal Pain     Sputum    Joint Pain y Back pain   SOB/BECK n   Pruritis/Rash     Nausea/vomit n   Tolerating PT/OT     Diarrhea n   Tolerating Diet y    Constipation    Other       Could NOT obtain due to:      Objective:     VITALS:   Last 24hrs VS reviewed since prior progress note.  Most recent are:  Patient Vitals for the past 24 hrs:   Temp Pulse BP SpO2   11/13/22 1542 98.5 °F (36.9 °C) 63 103/65 93 %   11/13/22 1325 98.4 °F (36.9 °C) 71 -- --   11/13/22 0921 99 °F (37.2 °C) 78 (!) 89/69 -- Intake/Output Summary (Last 24 hours) at 11/13/2022 2042  Last data filed at 11/13/2022 1630  Gross per 24 hour   Intake 500 ml   Output 800 ml   Net -300 ml          I had a face to face encounter and independently examined this patient on 11/13/2022, as outlined below:  PHYSICAL EXAM:  General: Awake, No acute distress  EENT:  Anicteric sclerae. MMM  Resp:  CTA bilaterally, no wheezing or rales. No accessory muscle use  CV:  Regular  rhythm,  No edema  GI:  Soft, Non distended, Non tender. +Bowel sounds  Neurologic:  Alert and oriented X 3, normal speech, Power 4/5 on RLE, left AKA   Psych:   Not anxious nor agitated      Reviewed most current lab test results and cultures  YES  Reviewed most current radiology test results   YES  Review and summation of old records today    NO  Reviewed patient's current orders and MAR    YES  PMH/SH reviewed - no change compared to H&P  ________________________________________________________________________  Care Plan discussed with:    Comments   Patient y    Family      RN y    Care Manager     Consultant                       y Multidiciplinary team rounds were held today with , nursing, pharmacist and clinical coordinator. Patient's plan of care was discussed; medications were reviewed and discharge planning was addressed. ________________________________________________________________________        Comments   >50% of visit spent in counseling and coordination of care     ________________________________________________________________________  Armando Angelucci, MD     Procedures: see electronic medical records for all procedures/Xrays and details which were not copied into this note but were reviewed prior to creation of Plan. LABS:  I reviewed today's most current labs and imaging studies.   Pertinent labs include:  Recent Labs     11/13/22  1131 11/11/22  1036   WBC 17.4* 18.2*   HGB 10.0* 10.8*   HCT 32.9* 34.9*    398 Recent Labs     11/11/22  1121   PHOS 4.2           Signed: Titi Alicea MD 22-Jun-2023 23:09

## 2023-06-24 NOTE — PROGRESS NOTES
Patient to room with c/o vaginal itching, white vaginal drainage, and \"fishy\" odor beginning yesterday. Denies pain with urination. States previous unprotected intercourse, with one partner. Urine and genital swab obtained. Transition of Care Plan:    RUR: 13%  Disposition: [facility] IPR placement (pending bed availability)  If SNF or IPR: Date FOC offered: 11/4  Date [address] received: 11/5  Date authorization started with reference number:   Date authorization received and expires:   Accepting facility: [facility]   Follow up appointments: PCP, specialists as indicated   DME needed: N/A   Transportation at Discharge: medical transport   Goldy Prabhakar or means to access home: yes       IM Medicare Letter: N/A  Is patient a  and connected with the [place]? No               If yes, was [employer] transfer form completed and [facility] notified? Caregiver Contact: N/A  Discharge Caregiver contacted prior to discharge? Care Conference needed?:  No    Chart reviewed. CM continuing to follow for discharge planning needs. Spoke with Carrol Regan at [address] [place] [address]. Per Carrol Regan, no bed available for pt to admit until Monday, 11/21. CM inquired about status of authorization and whether or not it will remain approved until Monday. Per Carrol Regan she will keep CM staff updated. Delay entered. Will continue to follow.     Berlin Carrel, MSW   Care Manager, [address] [facility]  [phone]

## 2023-11-06 NOTE — DISCHARGE SUMMARY
Physician Discharge Summary     Patient ID:  Shreya Singletary  [id number]  46 y.o.  1965    Admit date: 5/29/2019    Discharge date and time: 6/19/2019    Admission Diagnoses: Open wound, lower leg [S81.809A]    Discharge Diagnoses:    Principal Diagnosis     Acute on chronic L foot wound infection                                              Other Diagnoses  Active Problems:    HTN (hypertension) (12/21/2017)    Physical debility (12/27/2017)    Chrnic pain disorder (8/8/2018)    Hypothyroidism (10/12/2018)    Depression (5/20/2019)    Open wound, lower leg (5/30/2019)     Hospital Course:   Acute on chronic L foot wound infection - POA, now improved post I&D by podiatry on 05/25.  Wound cx polymicrobial, with stenotrophomonas, proteus, klebsiella. He completed a 10 day course of IV ceftriaxone and tigecycline on 6/10.  Follow up wound care as outpatient Dr. Jaime Nava (Vascular) for saphenous vein ablation. Physical debility / Hx of hemorrhagic CVA w/ L sided hemiparesis -  Due to prior psychological issues, chronic narcotics and homelessness, no SNF will accept him. CM working on home health services and have made final arrangements. Continue PT/OT and ambulate as tolerated. His discharge has been delayed for 9 days so far due to this disposition problem.     HTN (hypertension) - POA, stable on norvasc, lisinopril     Hypothyroid - POA, TSH elevated due to non-compliance. Resumed synthroid.  Repeat TFTs in 2 weeks     Chronic pain / Chronic narcotic use / Anxiety and depression - continue methadone, gabapentin, miralax, sertraline, olanzepine, melatonin, prn oxycodone, but reduce to 5mg, add prn motrin and amitriptyline.   He is anxious that he will not get pain medicine at home.     Hx of bladder CA - s/p surgery in 04/2018 at [facility] need to f/u with his Urologist. Bedelia Palms flomax    Hx gout - Allopurinol and Colchisine    PCP: Savilla Rubinstein, MD    Consults: ID and Vascular Surgery    Significant Diagnostic Studies: See Hospital Course    Discharged home in improved condition. Discharge Exam:  /76 (BP 1 Location: Right arm, BP Patient Position: At rest)   Pulse (!) 55   Temp 97.5 °F (36.4 °C)   Resp 18   Ht 6' 1\" (1.854 m)   Wt 108.2 kg (238 lb 9.6 oz)   SpO2 95%   BMI 31.48 kg/m²      Gen:  Obese, in no acute distress  HEENT:  Pink conjunctivae, PERRL, hearing intact to voice, moist mucous membranes  Neck:  Supple, without masses, thyroid non-tender  Resp:  No accessory muscle use, clear breath sounds without wheezes rales or rhonchi  Card:  No murmurs, normal S1, S2 without thrills, bruits or peripheral edema  Abd:  Soft, non-tender, non-distended, normoactive bowel sounds are present, no mass  Lymph:  No cervical or inguinal adenopathy  Musc:  No cyanosis or clubbing  Skin:  No rashes or ulcers, skin turgor is good  Neuro:  Cranial nerves are grossly intact, General motor weakness, follows commands   Psych:  Good insight, oriented to person, place and time, alert    Patient Instructions:   Current Discharge Medication List      START taking these medications    Details   amitriptyline (ELAVIL) 50 mg tablet Take 1 Tab by mouth nightly for 30 days. Qty: 30 Tab, Refills: 0      ibuprofen (MOTRIN) 400 mg tablet Take 1 Tab by mouth every six (6) hours as needed for Pain for up to 30 days. Qty: 30 Tab, Refills: 0      oxyCODONE IR (ROXICODONE) 5 mg immediate release tablet Take 1 Tab by mouth every six (6) hours as needed for Pain for up to 3 days. Max Daily Amount: 20 mg.  Qty: 20 Tab, Refills: 0    Associated Diagnoses: Open wound of left lower leg, initial encounter      polyethylene glycol (MIRALAX) 17 gram packet Take 1 Packet by mouth daily for 30 days. Qty: 30 Packet, Refills: 0      psyllium husk-aspartame (METAMUCIL FIBER) 3.4 gram pwpk packet Take 1 Packet by mouth two (2) times a day for 30 days.   Qty: 60 Packet, Refills: 0      gabapentin (NEURONTIN) 300 mg capsule Take 1 Cap by mouth three (3) times daily for 30 days. Qty: 90 Cap, Refills: 0         CONTINUE these medications which have NOT CHANGED    Details   allopurinol (ZYLOPRIM) 100 mg tablet Take 1 Tab by mouth daily. Indications: treatment to prevent acute gout attack  Qty: 30 Tab, Refills: 0      levothyroxine (SYNTHROID) 100 mcg tablet Take 1 Tab by mouth Daily (before breakfast). Indications: hypothyroidism  Qty: 30 Tab, Refills: 0      tamsulosin (FLOMAX) 0.4 mg capsule Take 1 Cap by mouth daily. Indications: bladder cancer  Qty: 30 Cap, Refills: 0      sertraline (ZOLOFT) 50 mg tablet Take 1 Tab by mouth daily. Indications: major depressive disorder  Qty: 30 Tab, Refills: 0      senna-docusate (DARELL-COLACE) 8.6-50 mg per tablet Take 1 Tab by mouth daily. lisinopril (PRINIVIL, ZESTRIL) 20 mg tablet Take 2 Tabs by mouth daily. Indications: high blood pressure  Qty: 30 Tab, Refills: 0      amLODIPine (NORVASC) 10 mg tablet Take 1 Tab by mouth daily. Qty: 30 Tab, Refills: 0      methadone (DOLOPHINE) 10 mg/mL solution Take 130 mg by mouth daily. colchicine 0.6 mg tablet Take 0.6 mg by mouth daily as needed (gout flare). acetaminophen (TYLENOL) 325 mg tablet Take 2 Tabs by mouth every four (4) hours as needed. Indications: Arthritic Pain  Qty: 30 Tab, Refills: 0      melatonin 3 mg tablet Take 1 Tab by mouth nightly as needed. Qty: 10 Tab, Refills: 0           Activity: Activity as tolerated and PT/OT per Home Health  Diet: Cardiac Diet and Low fat, Low cholesterol  Wound Care: Keep wound clean and dry, Reinforce dressing PRN and As directed    Follow-up with your PCP in a few weeks.   Follow-up tests/labs - none    Signed:  Brianna Chang MD  6/19/2019  10:33 AM Fluconazole Counseling:  Patient counseled regarding adverse effects of fluconazole including but not limited to headache, diarrhea, nausea, upset stomach, liver function test abnormalities, taste disturbance, and stomach pain.  There is a rare possibility of liver failure that can occur when taking fluconazole.  The patient understands that monitoring of LFTs and kidney function test may be required, especially at baseline. The patient verbalized understanding of the proper use and possible adverse effects of fluconazole.  All of the patient's questions and concerns were addressed.

## 2024-05-29 NOTE — PROGRESS NOTES
Care Due:                  Date            Visit Type   Department     Provider  --------------------------------------------------------------------------------                                MYCHART                              ANNUAL                              CHECKUP/PHY  [facility]  Last Visit: 12-      S            ABI           [facility]  Enzo  Next Visit: None Scheduled  None         None Found                                                            Last  Test          Frequency    Reason                     Performed    Due Date  --------------------------------------------------------------------------------    CBC.........  12 months..  celecoxib, ibuprofen.....  07- 06-    CMP.........  12 months..  celecoxib,                 07- 06-                             hydroCHLOROthiazide,                             ibuprofen, olmesartan....    [employer] Embedded Care Due Messages. Reference number: [id number].   5/29/2024 5:15:55 AM CDT   OCCUPATIONAL THERAPY EVALUATION/DISCHARGE  Patient: Daysi Wilkerson (60 y.o. male)  Date: 7/1/2021  Primary Diagnosis: Severe sepsis ([facility] [address] [A41.9, R65.20]  Left leg cellulitis [D58.020]       Precautions: Fall       ASSESSMENT  Based on the objective data described below, the patient presents with decreased independence in self-care and functional mobility secondary to LLE wounds, 10/10 LLE pain, general weakness, non-functional LUE, decreased insight, and decreased activity tolerance. Patient has significant past medical history with h/o brain aneurysm resulting in CVA with L sided deficits. Patient reported he has been using a w/c for mobility and transferring to/from toilet and bed without assist. Patient also reported he completed all self-care tasks with assist from a friend to assist with LLE. Patient appears to be functioning close to his functional baseline for self-care and transfers. Patient received semisupine in bed and agreeable for therapy with coaxing. While sitting EOB, patient was able to don R shoe with mod I but required total assist for orthotic shoe on LLE. Patient educated on using sliding board for transfer to/from w/c but ultimately deemed too unsafe to attempt due to height of w/c. Patient demonstrated lateral transfer from EOB > w/c with CGA but required min/CGA for w/c > EOB secondary to safety. Patient was left semisupine in bed with all needs met. Patient does not have any additional acute OT needs at this time and is limited in further participation secondary to LLE pain. Current Level of Function (ADLs/self-care): independent to mod assist for self-care, mod I to CGA/min assist for functional transfers    Functional Outcome Measure: The patient scored 60 on the Barthel Index outcome measure which is indicative of 40% functional impairment.       Other factors to consider for discharge: fall risk, LLE pain     PLAN :    Recommendation for discharge: (in order for the patient to meet his/her long term goals)  Per chart review, patient is trying to transfer to [facility]    This discharge recommendation:  Has been made in collaboration with the attending provider and/or case management    IF patient discharges home will need the following DME: bedside commode, hospital bed, mechanical lift and transfer bench       SUBJECTIVE:   Patient stated If you would just let me do it my way, it would be safer.     OBJECTIVE DATA SUMMARY:   HISTORY:   Past Medical History:   Diagnosis Date    Aneurysm ([facility] [address]     (with stroke) brain    Bladder tumor     Cancer ([facility] [address]     bladder CA    Chronic pain     Family history of bladder cancer     GERD (gastroesophageal reflux disease)     Gout     History of vascular access device 04/25/2019    [facility] VAT 4 FR MIDLINE R Cephalic Limited access    History of vascular access device 04/26/2019    4 fr Midline right Cephalic midline access    Hypercholesterolemia     Hypertension     Lesion of bladder     Seizures ([facility] [address]     Stroke ([facility] [address] 2006    left sided weakness    Thromboembolus ([facility] [address]     left leg    Thyroid disease     TIA (transient ischemic attack) 2012     Past Surgical History:   Procedure Laterality Date    HX ORTHOPAEDIC      R arthroscopy    HX OTHER SURGICAL      x2 L foot    HX UROLOGICAL  04/20/2018    Cystoscopy, TURBT (greater than 5 cm resected) Dr. Khris Magana, [facility].  KNEE ARTHROSCP HARV      left knee    TRANSURETHRAL RESEC BLADDER NECK  08/10/2018       Prior Level of Function/Environment/Context: Patient lived with a friend who assisted him with self-care and functional mobility but has recently moved into hotel since he could no longer navigate stairs. Patient required assist for ADLs due to LLE wound but could transfer to/from w/c independently.    Expanded or extensive additional review of patient history:   Home Situation  Home Environment: Other (comment) (lives in hotel)  One/Two Story Residence: One story  # of [employer] Steps: 12  Living Alone: Yes  Support Systems: Friends \ neighbors  Patient Expects to be Discharged to[de-identified] Unknown  Current DME Used/Available at Home: Wheelchair, [address], [place], straight    Hand dominance: Right    EXAMINATION OF PERFORMANCE DEFICITS:  Cognitive/Behavioral Status:  Neurologic State: Alert  Orientation Level: Oriented X4  Cognition: Decreased command following; Impulsive;Poor safety awareness  Perception: Appears intact  Perseveration: Perseverates during conversation  Safety/Judgement: Decreased awareness of need for safety;Decreased awareness of need for assistance    Hearing: Auditory  Auditory Impairment: None    Vision/Perceptual:    Acuity: Within Defined Limits (not formally tested)      Range of Motion:  AROM: Generally decreased, functional (non-functional LUE and LLE)    Strength:  Strength: Generally decreased, functional (non-functional LUE and LLE)    Coordination:  Coordination: Generally decreased, functional (non-functional LUE and LLE)  Fine Motor Skills-Upper: Left Impaired;Right Intact (non-functional LUE)    Gross Motor Skills-Upper: Left Impaired;Right Intact    Tone & Sensation:  Tone: Abnormal (non-functional LUE)    Balance:  Sitting: Intact  Standing:  (not tested)    Functional Mobility and Transfers for ADLs:  Bed Mobility:  Rolling: Modified independent  Supine to Sit: Modified independent  Sit to Supine: Modified independent  Scooting: Modified independent    Transfers:  Bed to Chair: Contact guard assistance;Minimum assistance (transfer from EOB <> w/c)    ADL Assessment:  Feeding: Independent  Oral Facial Hygiene/Grooming: Modified Independent  Bathing: Moderate assistance (for LLE)  Upper Body Dressing: Modified independent  Lower Body Dressing: Minimum assistance (total for L shoe; mod I for R shoe)  Toileting: Minimum assistance    ADL Intervention and task modifications:    Lower Body Dressing Assistance  Dressing Assistance: Minimum assistance  Shoes with Velcro:  Total assistance (dependent) (bandaged LLE)  Shoes with Cloth Laces: Modified independent  Position Performed: Bending forward method;Seated edge of bed  Cues: Don;Physical assistance; Tactile cues provided;Verbal cues provided    Cognitive Retraining  Safety/Judgement: Decreased awareness of need for safety;Decreased awareness of need for assistance    Functional Measure:  Barthel Index:    Bathin  Bladder: 10  Bowels: 10  Groomin  Dressin  Feeding: 10  Mobility: 5  Stairs: 0  Toilet Use: 5  Transfer (Bed to Chair and Back): 10  Total: 60/100        The Barthel ADL Index: Guidelines  1. The index should be used as a record of what a patient does, not as a record of what a patient could do. 2. The main aim is to establish degree of independence from any help, physical or verbal, however minor and for whatever reason. 3. The need for supervision renders the patient not independent. 4. A patient's performance should be established using the best available evidence. Asking the patient, friends/relatives and nurses are the usual sources, but direct observation and common sense are also important. However direct testing is not needed. 5. Usually the patient's performance over the preceding 24-48 hours is important, but occasionally longer periods will be relevant. 6. Middle categories imply that the patient supplies over 50 per cent of the effort. 7. Use of aids to be independent is allowed. Antonina Rosenberg., Barthel, D.W. ([id number]). Functional evaluation: the Barthel Index. [facility] (14)2. Onur ISAI Murillo, Nilda Osullivan., Diomedes Menon., [place], [address] [place] [address] (). Measuring the change indisability after inpatient rehabilitation; comparison of the responsiveness of the Barthel Index and Functional Russell Measure. Journal of Neurology, Neurosurgery, and Psychiatry, 66(4), 469-971.   SUDHA Le.A, ELEANOR Hines, & Sharon Garrett, M.A. (2004.) Assessment of post-stroke quality of life in cost-effectiveness studies: The usefulness of the Barthel Index and the EuroQoL-5D. Quality of Life Research, 13, 223-56     Based on the above components, the patient evaluation is determined to be of the following complexity level: MEDIUM  Pain Rating:  Patient c/o 10/10 LLE. RN aware and following. Activity Tolerance:   Fair and requires rest breaks    After treatment patient left in no apparent distress:    Supine in bed, Call bell within reach and Side rails x 3    COMMUNICATION/EDUCATION:   The patients plan of care was discussed with: Physical therapist and Registered nurse.      Thank you for this referral.  Rut Sullivan, OTR/L  Time Calculation: 40 mins

## 2024-07-04 NOTE — PROGRESS NOTES
Hospitalist Progress Note    NAME: Laura Mccray   :  1965   MRN:  [id number]       Assessment / Plan:  T12 inferior vertebral endplate compression fracture  Acute low back pain due to above     -MRI shows evidence of acute T12 fracture. Kyphoplasty performed 10/27/22  No saddle anesthesia, no numbness, no focal weakness, no issues with urination  Still with persistent back pain. 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 Talked to neurosurgery Dr. Marian Closs, doesn't think he needs surgical intervention as he doesn't have significant weakness of the RLE (RLE power 3/5 , has AKA on the left)  Discussed with Radiology, recommended doing CT thoracic spine, ordered. Pain control with fentanyl patch, methadone 60 mg daily, oxycodone PRN. Added dilaudid today as pain not controlled  Will need SNF on discharge  Having BM now     Carbapenem resistant urinary tract infection  History of recent Gross catheter and also straight cath  -Urine culture grew Carbapenem resistant Klebsiella pneumonia  -Based on urine culture and sensitivity, currently on gentamicin  - consulted ID 10/26/22, continued Gentamicin through 10/28/22, course completed     Suspected aspiration pneumonia  Acute hypoxic respiratory failure  -MRI shows evidence of aspiration pneumonia. Chest x-ray also shows basilar infiltrates with possible effusion  -He is also reporting some worsening of shortness of breath along with cough and small amount of phlegm  -started empiric antibiotics with cefepime and Flagyl, started 10/25/22  - changed to Cefdinir flagyl, EOT 10/31/22, 7 day course  -Aspiration precautions.  -We will change to dysphagia diet. We will consult speech therapy, appreciate assistance  -Has leukocytosis of 14,000 but he was recently on steroids which may be contributing.   Procal normal  -Continue oxygen nasal cannula, Wean as tolerated  - CXR 10/28/22 showed L hemithorax opacity possible LLL collapse, L pleural effusion  - incentive spirometery  - consulted Pulmonology for persistent O2 requirement, added bronchodilators and mucolytics  - followup CT in 1 year for 3mm pulm nodule     Possible gout flare - improved  Describes knee pain, similar to previous gout flare  - started colchicine 1.2 x1, 0.6 x1, then 0.6 daily  - started allopurinol 100 daily. Was prescribed 300 daily but not taking consistently, so essentially starting over     Chronic pain on methadone  -Continue home methadone. Rest of pain meds as above    Debility secondary to CVA and recent left AKA  -Continue PT OT     Chronic wounds  -Continue wound care     Mild COPD exacerbation  Prn duonebs  S/p prednisone    Leukocytosis likely due to steroids - improved  - He is afebrile  - monitor     GERD  Protonix daily     Essential hypertension  Continue PTA Coreg     Hypothyroidism  Continue PTA Synthroid     Incidental findings requiring outpatient follow up/ evaluation:  3 mm right lung nodule. -Guidelines recommend repeat scan in 12 months  Increase in size of intermediate density left renal lesion. Recommend  follow-up with ultrasound in 6 months. Left AKA     30.0 - 39.9 Obese / Body mass index is 30.79 kg/m².      Code status: Full  Prophylaxis: Lovenox  Recommended Disposition: SNF  BARBARA: 11/5  Barriers: IPR placement     Subjective:     Chief Complaint / Reason for Physician Visit  Follow up for vertebral fracture  Continues to have back pain, slightly better, dialudid seems to be helping  He is motivated today to work with PT and requesting for sheltering arms    Review of Systems:  Symptom Y/N Comments  Symptom Y/N Comments   Fever/Chills n   Chest Pain n    Poor Appetite n   Edema n    Cough    Abdominal Pain     Sputum    Joint Pain y Back pain   SOB/BECK    Pruritis/Rash     Nausea/vomit    Tolerating PT/OT     Diarrhea    Tolerating Diet Constipation    Other       Could NOT obtain due to:      Objective:     VITALS:   Last 24hrs VS reviewed since prior progress note. Most recent are:  Patient Vitals for the past 24 hrs:   Temp Pulse Resp BP SpO2   11/04/22 1457 -- 70 -- 106/65 --   11/04/22 1421 98.1 °F (36.7 °C) 64 20 (!) 116/59 90 %   11/04/22 0908 98.1 °F (36.7 °C) 64 20 128/71 93 %   11/03/22 2307 98 °F (36.7 °C) 75 17 (!) 141/86 98 %       Intake/Output Summary (Last 24 hours) at 11/4/2022 1646  Last data filed at 11/4/2022 1140  Gross per 24 hour   Intake 3050 ml   Output 2000 ml   Net 1050 ml        I had a face to face encounter and independently examined this patient on 11/4/2022, as outlined below:  PHYSICAL EXAM:  General: Awake, No acute distress  EENT:  EOMI. Anicteric sclerae. MMM  Resp:  CTA bilaterally, no wheezing or rales. No accessory muscle use  CV:  Regular  rhythm,  No edema  GI:  Soft, Non distended, Non tender. +Bowel sounds  Neurologic:  Alert and oriented X 3, normal speech,   Psych:   Not anxious nor agitated  Ext: Power 3/5 on RLE, left AKA (has prior weakness)  Power 5/5 on RUE and has residual weakness on left    Reviewed most current lab test results and cultures  YES  Reviewed most current radiology test results   YES  Review and summation of old records today    NO  Reviewed patient's current orders and MAR    YES  PMH/ reviewed - no change compared to H&P  ________________________________________________________________________  Care Plan discussed with:    Comments   Patient y    Family      RN y    Care Manager     Consultant                       y Multidiciplinary team rounds were held today with , nursing, pharmacist and clinical coordinator. Patient's plan of care was discussed; medications were reviewed and discharge planning was addressed.      ________________________________________________________________________  Total NON critical care TIME:  36   Minutes    Total CRITICAL CARE TIME Spent: Minutes non procedure based      Comments   >50% of visit spent in counseling and coordination of care     ________________________________________________________________________  Nick Jose MD     Procedures: see electronic medical records for all procedures/Xrays and details which were not copied into this note but were reviewed prior to creation of Plan. LABS:  I reviewed today's most current labs and imaging studies.   Pertinent labs include:  Recent Labs     11/03/22  1000 11/02/22  0500   WBC 8.0 10.1   HGB 9.2* 9.3*   HCT 29.9* 30.8*   * 546*     Recent Labs     11/03/22  1000 11/02/22  0500   * 138   K 4.9 3.5   CL 99 98   CO2 29 30   * 105*   BUN 6 6   CREA 0.96 0.86   CA 9.0 9.1   MG 2.0 2.0   PHOS 5.2* 5.8*       Signed: Nick Jose MD no

## 2024-11-05 NOTE — PROGRESS NOTES
All Patches/Pads removed. Skin Intact. CM received call from Roberto, pt's friend. CM verified Venkata's information on chart. Roberto stated she would like resources for stable housing for the pt. CM encouraged Venkata to look at [facility] and the local [facility] for information re: stable housing. Roberto verbalized understanding. Roberto voiced frustration with pt's behaviors at times. CM offered emotional support. Roberto has no further questions or needs at this time. CM to continue to remain available for support, discharge planning as needed.      Leticia Junior, MSW  [facility]  [phone]

## (undated) DEVICE — BULB SYRINGE, IRRIGATION WITH PROTECTIVE CAP, 60 CC, INDIVIDUALLY WRAPPED: Brand: DOVER

## (undated) DEVICE — CULTURETTE SGL EVAC TUBE PALL -- 100/CA

## (undated) DEVICE — BANDAGE COMPR SELF ADH 5 YDX4 IN TAN STRL PREMIERPRO LF

## (undated) DEVICE — INSULATED BLADE ELECTRODE: Brand: EDGE

## (undated) DEVICE — LIGHT HANDLE: Brand: DEVON

## (undated) DEVICE — ABDOMINAL PAD: Brand: DERMACEA

## (undated) DEVICE — ROCKER SWITCH PENCIL BLADE ELECTRODE, HOLSTER: Brand: EDGE

## (undated) DEVICE — SOLUTION IRRIG 3000ML 0.9% SOD CHL FLX CONT 0797208] ICU MEDICAL INC]

## (undated) DEVICE — ROCKER SWITCH PENCIL HOLSTER: Brand: VALLEYLAB

## (undated) DEVICE — INTENDED FOR TISSUE SEPARATION, AND OTHER PROCEDURES THAT REQUIRE A SHARP SURGICAL BLADE TO PUNCTURE OR CUT.: Brand: BARD-PARKER ® CARBON RIB-BACK BLADES

## (undated) DEVICE — DRAPE,EXTREMITY,89X128,STERILE: Brand: MEDLINE

## (undated) DEVICE — SINGLE BASIN: Brand: CARDINAL HEALTH

## (undated) DEVICE — BASIC PACK: Brand: CONVERTORS

## (undated) DEVICE — IODOFORM PACKING STRIP: Brand: CURITY

## (undated) DEVICE — STRETCH BANDAGE ROLL: Brand: DERMACEA

## (undated) DEVICE — ARGYLE FRAZIER SURGICAL SUCTION INSTRUMENT 10 FR/CH (3.3 MM): Brand: ARGYLE

## (undated) DEVICE — HOOK LOCK LATEX FREE ELASTIC BANDAGE 6INX5YD

## (undated) DEVICE — STERILE POLYISOPRENE POWDER-FREE SURGICAL GLOVES: Brand: PROTEXIS

## (undated) DEVICE — Device

## (undated) DEVICE — PADDING CAST SOF-ROL 4INX4YD -- NS

## (undated) DEVICE — HANDLE LT SNAP ON ULT DURABLE LENS FOR TRUMPF ALC DISPOSABLE

## (undated) DEVICE — BANDAGE COMPR W6INXL10YD ST M E WHITE/BEIGE

## (undated) DEVICE — 3000CC GUARDIAN II: Brand: GUARDIAN

## (undated) DEVICE — SWAB CULT LIQ STUART AGR AERB MOD IN BRK SGL RAYON TIP PLAS 220099] BECTON DICKINSON MICRO]

## (undated) DEVICE — DRAPE,REIN 53X77,STERILE: Brand: MEDLINE

## (undated) DEVICE — BANDAGE COMPR 9 FTX4 IN SMOOTH COMFORTABLE SYNTH ESMRK LF

## (undated) DEVICE — SYR IRR BLB 2OZ DISP BLU STRL -- CONVERT TO ITEM 357637

## (undated) DEVICE — NEEDLE HYPO 25GA L1.5IN BVL ORIENTED ECLIPSE

## (undated) DEVICE — REM POLYHESIVE ADULT PATIENT RETURN ELECTRODE: Brand: VALLEYLAB

## (undated) DEVICE — SURGICAL PROCEDURE PACK BASIN MAJ SET CUST NO CAUT

## (undated) DEVICE — SOLUTION IV 1000ML 0.9% SOD CHL

## (undated) DEVICE — PADDING CST CRMPD 3INX4YD NS --

## (undated) DEVICE — TOWEL,OR,DSP,ST,BLUE,STD,2/PK,40PK/CS: Brand: MEDLINE

## (undated) DEVICE — TOWEL SURG W17XL27IN STD BLU COT NONFENESTRATED PREWASHED

## (undated) DEVICE — GAUZE SPONGES,12 PLY: Brand: CURITY

## (undated) DEVICE — Z INACTIVE USE 2275367 DRESSING GEL 3OZ HYDRGEL WND FILL CARRASYN

## (undated) DEVICE — KERLIX BANDAGE ROLL: Brand: KERLIX

## (undated) DEVICE — HOOK LOCK LATEX FREE ELASTIC BANDAGE 4INX5YD

## (undated) DEVICE — PLUS HANDPIECE WITH HIGH FLOW TRAUMA TIP WITH SOFT CONE SPLASH SHIELD: Brand: SURGILAV

## (undated) DEVICE — INFECTION CONTROL KIT SYS

## (undated) DEVICE — SUTURE VCRL SZ 3-0 L27IN ABSRB UD L26MM SH 1/2 CIR J416H

## (undated) DEVICE — CURITY NON-ADHERENT STRIPS: Brand: CURITY

## (undated) DEVICE — DEVON™ KNEE AND BODY STRAP 60" X 3" (1.5 M X 7.6 CM): Brand: DEVON

## (undated) DEVICE — SPONGE GZ W4XL4IN COT 12 PLY TYP VII WVN C FLD DSGN

## (undated) DEVICE — STERILE POLYISOPRENE POWDER-FREE SURGICAL GLOVES WITH EMOLLIENT COATING: Brand: PROTEXIS

## (undated) DEVICE — NEEDLE HYPO 18GA L1.5IN PNK S STL HUB POLYPR SHLD REG BVL

## (undated) DEVICE — (D)PREP SKN CHLRAPRP APPL 26ML -- CONVERT TO ITEM 371833

## (undated) DEVICE — SHEET, DRAPE, SPLIT, STERILE: Brand: MEDLINE

## (undated) DEVICE — TRAY PREP DRY W/ PREM GLV 2 APPL 6 SPNG 2 UNDPD 1 OVERWRAP

## (undated) DEVICE — DRESSING PETRO GZ XRFRM CURAD ST OVERWRAP 5 X 9 IN

## (undated) DEVICE — (D)BNDG COHESIVE 6X5YD TAN LTX -- DUPE USE ITEM 357348

## (undated) DEVICE — STOCKINETTE: Brand: DEROYAL

## (undated) DEVICE — 3M™ COBAN™ NL STERILE NON-LATEX SELF-ADHERENT WRAP, 2084S, 4 IN X 5 YD (10 CM X 4,5 M), 18 ROLLS/CASE: Brand: 3M™ COBAN™

## (undated) DEVICE — CURITY IDOFORM PACKING STRIP: Brand: CURITY

## (undated) DEVICE — DISPOSABLE TOURNIQUET CUFF SINGLE BLADDER, DUAL PORT AND QUICK CONNECT CONNECTOR: Brand: COLOR CUFF

## (undated) DEVICE — (D)STRIP SKN CLSR 0.5X4IN WHT --

## (undated) DEVICE — PADDING CST 4INX4YD --

## (undated) DEVICE — PACK,BASIC,SIRUS,V: Brand: MEDLINE

## (undated) DEVICE — SYR 10ML LUER LOK 1/5ML GRAD --

## (undated) DEVICE — STOCKINETTE,IMPERVIOUS,12X48,STERILE: Brand: MEDLINE

## (undated) DEVICE — OCCLUSIVE GAUZE STRIP,3% BISMUTH TRIBROMOPHENATE IN PETROLATUM BLEND: Brand: XEROFORM

## (undated) DEVICE — SWAB CULT DBL W/O CHAR RAYON TIP AMIES GEL CLMN FOR COLL

## (undated) DEVICE — 3M™ COBAN™ NL STERILE NON-LATEX SELF-ADHERENT WRAP, 2083S, 3 IN X 5 YD (7,5 CM X 4,5 M), 24 ROLLS/CASE: Brand: 3M™ COBAN™